# Patient Record
Sex: FEMALE | Race: WHITE | NOT HISPANIC OR LATINO | Employment: OTHER | URBAN - METROPOLITAN AREA
[De-identification: names, ages, dates, MRNs, and addresses within clinical notes are randomized per-mention and may not be internally consistent; named-entity substitution may affect disease eponyms.]

---

## 2017-01-05 ENCOUNTER — OFFICE VISIT (OUTPATIENT)
Dept: SURGERY | Facility: CLINIC | Age: 72
End: 2017-01-05
Payer: MEDICARE

## 2017-01-05 DIAGNOSIS — K21.9 GASTROESOPHAGEAL REFLUX DISEASE, ESOPHAGITIS PRESENCE NOT SPECIFIED: Primary | ICD-10-CM

## 2017-01-05 DIAGNOSIS — K21.00 REFLUX ESOPHAGITIS: ICD-10-CM

## 2017-01-05 DIAGNOSIS — Z12.9 CANCER SCREENING: ICD-10-CM

## 2017-01-05 PROCEDURE — 99203 OFFICE O/P NEW LOW 30 MIN: CPT | Mod: S$PBB,,, | Performed by: COLON & RECTAL SURGERY

## 2017-01-05 NOTE — PROGRESS NOTES
Patient ID:  Ayla Dela Cruz is a 71 y.o. female     Chief Complaint: No chief complaint on file.       HPI: history of reflux. Had stroke Was scheduled for colonoscopy but it was posponed. Old records not available    ROS:        Constitutional: No fever, chills, activity or appetite change.      HENT: No hearing loss, facial swelling, neck pain or stiffness.       Eyes: No discharge, itching and visual disturbance.      Respiratory: No apnea, cough, choking or shortness of breath.       Cardiovascular: No leg swelling or chest pain      Gastrointestinal: No abdominal distention or change in bowel habbits     Genitourinary: No dysuria, frequency or flank pain.      Musculoskeletal: No arthralgias or gait problem.      Neurological: No dizziness, seizures or weakness.      Hematological: No adenopathy.      Psychiatric/Behavioral: No hallucinations or behavioral problems.       PE:    APPEARANCE: Well nourished, well developed, in no acute distress.   CHEST: Lungs clear. Normal respiratory effort.  CARDIOVASCULAR: Normal rhythm. No edema.  ABDOMEN: Soft. No tenderness or masses.  Rectum:  Normal skin, NST, no masses or tenderness    Musculoskeletal: Symmetric, normal range of motion and strength.   Neurological: Alert and oriented to person, place, and time. Normal reflexes.   Skin: Skin is warm and dry.   Psychiatric: Normal mood and affect. Behavior is normal and appropriate.     Impression: Reflus, need for colorectal cancer screening  PLAN: EGD Colonoscopy

## 2017-01-16 ENCOUNTER — DOCUMENTATION ONLY (OUTPATIENT)
Dept: FAMILY MEDICINE | Facility: CLINIC | Age: 72
End: 2017-01-16

## 2017-01-16 ENCOUNTER — OFFICE VISIT (OUTPATIENT)
Dept: FAMILY MEDICINE | Facility: CLINIC | Age: 72
End: 2017-01-16
Payer: MEDICARE

## 2017-01-16 VITALS
TEMPERATURE: 98 F | OXYGEN SATURATION: 98 % | SYSTOLIC BLOOD PRESSURE: 132 MMHG | WEIGHT: 135.13 LBS | HEART RATE: 90 BPM | HEIGHT: 67 IN | DIASTOLIC BLOOD PRESSURE: 77 MMHG | BODY MASS INDEX: 21.21 KG/M2

## 2017-01-16 DIAGNOSIS — I10 ESSENTIAL HYPERTENSION: ICD-10-CM

## 2017-01-16 DIAGNOSIS — E78.5 HYPERLIPIDEMIA, UNSPECIFIED HYPERLIPIDEMIA TYPE: ICD-10-CM

## 2017-01-16 DIAGNOSIS — Z86.39 HISTORY OF ELEVATED GLUCOSE: ICD-10-CM

## 2017-01-16 DIAGNOSIS — K21.9 GASTROESOPHAGEAL REFLUX DISEASE WITHOUT ESOPHAGITIS: ICD-10-CM

## 2017-01-16 DIAGNOSIS — Z78.0 POST-MENOPAUSAL: ICD-10-CM

## 2017-01-16 DIAGNOSIS — Z00.00 ANNUAL PHYSICAL EXAM: Primary | ICD-10-CM

## 2017-01-16 DIAGNOSIS — F41.9 ANXIETY: ICD-10-CM

## 2017-01-16 PROCEDURE — 99214 OFFICE O/P EST MOD 30 MIN: CPT | Mod: S$PBB,,, | Performed by: PHYSICIAN ASSISTANT

## 2017-01-16 PROCEDURE — 99999 PR PBB SHADOW E&M-EST. PATIENT-LVL IV: CPT | Mod: PBBFAC,,, | Performed by: PHYSICIAN ASSISTANT

## 2017-01-16 PROCEDURE — G0008 ADMIN INFLUENZA VIRUS VAC: HCPCS | Mod: PBBFAC,PO | Performed by: FAMILY MEDICINE

## 2017-01-16 PROCEDURE — 99214 OFFICE O/P EST MOD 30 MIN: CPT | Mod: PBBFAC,PO | Performed by: PHYSICIAN ASSISTANT

## 2017-01-16 RX ORDER — CLOBETASOL PROPIONATE 0.5 MG/G
CREAM TOPICAL
Refills: 3 | Status: ON HOLD | COMMUNITY
Start: 2016-11-25 | End: 2018-03-06 | Stop reason: CLARIF

## 2017-01-16 RX ORDER — VILAZODONE HYDROCHLORIDE 40 MG/1
40 TABLET ORAL DAILY
Refills: 2 | COMMUNITY
Start: 2016-12-19 | End: 2021-02-04 | Stop reason: CLARIF

## 2017-01-16 NOTE — PROGRESS NOTES
Pre-Visit Chart Review  For Appointment Scheduled on 1/16/17    Health Maintenance Due   Topic Date Due    TETANUS VACCINE  03/12/1961    Zoster Vaccine  03/12/2003    Colonoscopy  03/12/2013    Influenza Vaccine  08/01/2016    Pneumococcal (65+) (2 of 2 - PPSV23) 11/16/2016                   c

## 2017-01-16 NOTE — MR AVS SNAPSHOT
[facility]  [address]  [facility] [address]  Phone: [phone]  Fax: [phone]                  Ayla Dela Cruz   2017 2:40 PM   Office Visit    Description:  Female : 1945   Provider:  VIOLET Dominguez   Department:  [facility]           Reason for Visit     Annual Exam           Diagnoses this Visit        Comments    Annual physical exam    -  Primary     History of elevated glucose         Anxiety         Hyperlipidemia, unspecified hyperlipidemia type         Post-menopausal                To Do List           Future Appointments        Provider Department Dept Phone    2017 8:15 AM LAB, CHRISTOPHER SAT [facility] - Lab [phone]    2017 9:00 AM SLIC DEXA1 [facility] [phone]    4/3/2017 3:30 PM Barrett Jurado MD [facility] - Cardiology [phone]      Your Future Surgeries/Procedures     2017   Surgery with Jonathan Schultz MD   [facility] [address]  [facility] [id number] [phone]              Goals (5 Years of Data)     None      [facility]     [facility] Care [facility] -  Assistance  Registered nurses in the [facility] provide clinical advisement, health education, appointment booking, and other advisory services.  Call for this free service at [phone].             Medications           Message regarding Medications     Verify the changes and/or additions to your medication regime listed below are the same as discussed with your clinician today.  If any of these changes or additions are incorrect, please notify your healthcare provider.        STOP taking these medications     SENNOSIDES (SENOKOT ORAL) Take by mouth as needed.     PATADAY 0.2 % Drop 1 DROP IN BOTH EYES DAILY           Verify that the below list of medications is an accurate representation of the medications you are currently taking.  " If none reported, the list may be blank. If incorrect, please contact your healthcare provider. Carry this list with you in case of emergency.           Current Medications     ammonium lactate (LAC-HYDRIN) 12 % lotion     clobetasol (TEMOVATE) 0.05 % cream APPLY TWICE DAILY TO RASH UP TO 2 WEEKS/MONTH AS NEEDED.    dorzolamide-timolol (COSOPT) 2-0.5 % ophthalmic solution Place 1 drop into both eyes 2 (two) times daily. Twice a day    esomeprazole (NEXIUM) 40 MG capsule TAKE 1 CAPSULE (40 MG TOTAL) BY MOUTH ONCE DAILY. EVERY DAY    flecainide (TAMBOCOR) 100 MG Tab TAKE 1 TABLET (100 MG TOTAL) BY MOUTH EVERY 12 (TWELVE) HOURS.    lorazepam (ATIVAN) 1 MG tablet Take 1 tablet (1 mg total) by mouth 3 (three) times daily as needed for Anxiety. anxiety    losartan (COZAAR) 100 MG tablet TAKE 1 TABLET (100 MG TOTAL) BY MOUTH ONCE DAILY.    polyethylene glycol (GLYCOLAX) 17 gram PwPk Take 17 g by mouth as needed.    rosuvastatin (CRESTOR) 40 MG Tab Take 1 tablet (40 mg total) by mouth every evening.    salsalate (DISALCID) 500 MG Tab 500 mg once daily.     VIIBRYD 40 mg Tab tablet Take 40 mg by mouth once daily.    XARELTO 20 mg Tab TAKE 1 TABLET (20 MG TOTAL) BY MOUTH DAILY WITH DINNER OR EVENING MEAL.           Clinical Reference Information           Vital Signs - Last Recorded  Most recent update: 1/16/2017  2:59 PM by Jessica Alford    BP Pulse Temp Ht Wt SpO2    132/77 (BP Location: Right arm, Patient Position: Sitting, BP Method: Automatic) 90 97.5 °F (36.4 °C) (Oral) 5' 7" (1.702 m) 61.3 kg (135 lb 2.3 oz) 98%    BMI                21.17 kg/m2          Blood Pressure          Most Recent Value    BP  132/77      Allergies as of 1/16/2017     Atorvastatin    Augmentin [Amoxicillin-pot Clavulanate]    Baclofen (Bulk)    Ciprofloxacin (Bulk)    Decongest Tabs    Erythromycin    Fluoxetine    Lisinopril    Morphine    Venlafaxine Analogues    Afrin [Oxymetazoline]    Caffeine      Immunizations Administered on Date of " Encounter - 1/16/2017     Name Date Dose VIS Date Route    Influenza - High Dose 1/16/2017 0.5 mL 8/7/2015 Intramuscular      Orders Placed During Today's Visit      Normal Orders This Visit    Influenza - High Dose (65+) (PF) (IM)     Future Labs/Procedures Expected by Expires    CBC auto differential  1/16/2017 3/17/2018    Comprehensive metabolic panel  1/16/2017 3/17/2018    DXA Bone Density Spine And Hip  1/16/2017 1/16/2018    Hemoglobin A1c  1/16/2017 3/17/2018    Lipid panel  1/16/2017 3/17/2018    TSH  1/16/2017 3/17/2018

## 2017-01-17 NOTE — PROGRESS NOTES
Subjective:       Patient ID: Ayla Dela Cruz is a 71 y.o. female.    Chief Complaint: Annual Exam    HPI   Patient is a 71 year old  female presenting tot he clinic for routine physical exam. She is doing much better after hospitalization in fall of last year for cholecystitis with sepsis. She is following with Dr. Jurado in cardiology. She is due for some fasting labs, dexa bone scan. Mammogram is up-to-date as on 11/2016. She is scheduled for colonoscopy Thursday with Dr. Schultz. She requests flu vaccintation today.   Review of Systems   Constitutional: Negative for activity change, appetite change, chills, diaphoresis, fatigue and fever.   HENT: Negative for congestion, postnasal drip and rhinorrhea.    Respiratory: Negative.  Negative for cough, shortness of breath and wheezing.    Cardiovascular: Negative.  Negative for chest pain.   Gastrointestinal: Negative for abdominal pain, blood in stool, constipation, diarrhea, nausea and vomiting.   Genitourinary: Negative for dysuria, frequency, hematuria and urgency.   Musculoskeletal: Negative.    Skin: Negative.  Negative for color change and rash.   Neurological: Negative for dizziness, syncope, light-headedness and headaches.   Psychiatric/Behavioral: Negative for agitation, behavioral problems and confusion.       Objective:      Physical Exam   Constitutional: Vital signs are normal. She appears well-developed and well-nourished. No distress.   HENT:   Head: Normocephalic and atraumatic.   Right Ear: Hearing, tympanic membrane, external ear and ear canal normal.   Left Ear: Hearing, tympanic membrane, external ear and ear canal normal.   Nose: Nose normal.   Mouth/Throat: Uvula is midline and oropharynx is clear and moist.   Cardiovascular: Normal rate, regular rhythm, S1 normal, S2 normal and normal heart sounds.  Exam reveals no gallop.    No murmur heard.  Pulses:       Radial pulses are 2+ on the right side, and 2+ on the left side.   <2sec cap  refill fingers bilat     Pulmonary/Chest: Effort normal and breath sounds normal. No respiratory distress. She has no wheezes. She has no rhonchi.   Abdominal: Soft. Normal appearance. There is no tenderness. There is no rigidity, no guarding, no tenderness at McBurney's point and negative Carter's sign.   Skin: Skin is warm and dry. She is not diaphoretic.   Appropriate skin turgor   Psychiatric: She has a normal mood and affect. Her speech is normal and behavior is normal. Judgment and thought content normal. Cognition and memory are normal.       Assessment:       1. Annual physical exam    2. History of elevated glucose    3. Anxiety    4. Hyperlipidemia, unspecified hyperlipidemia type    5. Post-menopausal    6. Essential hypertension    7. Gastroesophageal reflux disease without esophagitis        Plan:   Ayla was seen today for annual exam.    Diagnoses and all orders for this visit:    Annual physical exam  -     CBC auto differential; Future  -     Comprehensive metabolic panel; Future    History of elevated glucose  -     Hemoglobin A1c; Future    Anxiety  -     CBC auto differential; Future  -     TSH; Future    Hyperlipidemia, unspecified hyperlipidemia type  -     CBC auto differential; Future  -     Lipid panel; Future    Post-menopausal  -     DXA Bone Density Spine And Hip; Future    Essential hypertension  Good control  Continue current bp medications    Gastroesophageal reflux disease without esophagitis  Well controlled on Nexium  Continue current dose of medication    Other orders  -     Influenza - High Dose (65+) (PF) (IM)    Patient readiness: acceptance and barriers:none    During the course of the visit the patient was educated and counseled about the following:     Hypertension:   Medication: no change.    Goals: Hypertension: Reduce Blood Pressure    Did patient meet goals/outcomes: No    The following self management tools provided: declined    Patient Instructions (the written plan)  was given to the patient/family.     Time spent with patient: 25 minutes

## 2017-01-18 ENCOUNTER — LAB VISIT (OUTPATIENT)
Dept: LAB | Facility: HOSPITAL | Age: 72
End: 2017-01-18
Attending: FAMILY MEDICINE
Payer: MEDICARE

## 2017-01-18 DIAGNOSIS — E78.5 HYPERLIPIDEMIA, UNSPECIFIED HYPERLIPIDEMIA TYPE: ICD-10-CM

## 2017-01-18 DIAGNOSIS — Z86.39 HISTORY OF ELEVATED GLUCOSE: ICD-10-CM

## 2017-01-18 DIAGNOSIS — F41.9 ANXIETY: ICD-10-CM

## 2017-01-18 DIAGNOSIS — Z00.00 ANNUAL PHYSICAL EXAM: ICD-10-CM

## 2017-01-18 LAB
ALBUMIN SERPL BCP-MCNC: 3.7 G/DL
ALP SERPL-CCNC: 67 U/L
ALT SERPL W/O P-5'-P-CCNC: 10 U/L
ANION GAP SERPL CALC-SCNC: 9 MMOL/L
AST SERPL-CCNC: 21 U/L
BASOPHILS # BLD AUTO: 0.05 K/UL
BASOPHILS NFR BLD: 1 %
BILIRUB SERPL-MCNC: 0.5 MG/DL
BUN SERPL-MCNC: 17 MG/DL
CALCIUM SERPL-MCNC: 9.2 MG/DL
CHLORIDE SERPL-SCNC: 107 MMOL/L
CHOLEST/HDLC SERPL: 2.4 {RATIO}
CO2 SERPL-SCNC: 27 MMOL/L
CREAT SERPL-MCNC: 0.8 MG/DL
DIFFERENTIAL METHOD: ABNORMAL
EOSINOPHIL # BLD AUTO: 0.4 K/UL
EOSINOPHIL NFR BLD: 7 %
ERYTHROCYTE [DISTWIDTH] IN BLOOD BY AUTOMATED COUNT: 15.1 %
EST. GFR  (AFRICAN AMERICAN): >60 ML/MIN/1.73 M^2
EST. GFR  (NON AFRICAN AMERICAN): >60 ML/MIN/1.73 M^2
GLUCOSE SERPL-MCNC: 93 MG/DL
HCT VFR BLD AUTO: 38.6 %
HDL/CHOLESTEROL RATIO: 41.5 %
HDLC SERPL-MCNC: 188 MG/DL
HDLC SERPL-MCNC: 78 MG/DL
HGB BLD-MCNC: 12.3 G/DL
LDLC SERPL CALC-MCNC: 92 MG/DL
LYMPHOCYTES # BLD AUTO: 0.7 K/UL
LYMPHOCYTES NFR BLD: 13.4 %
MCH RBC QN AUTO: 30.2 PG
MCHC RBC AUTO-ENTMCNC: 31.9 %
MCV RBC AUTO: 95 FL
MONOCYTES # BLD AUTO: 0.9 K/UL
MONOCYTES NFR BLD: 16.5 %
NEUTROPHILS # BLD AUTO: 3.2 K/UL
NEUTROPHILS NFR BLD: 61.9 %
NONHDLC SERPL-MCNC: 110 MG/DL
PLATELET # BLD AUTO: 277 K/UL
PMV BLD AUTO: 9.5 FL
POTASSIUM SERPL-SCNC: 4 MMOL/L
PROT SERPL-MCNC: 6.9 G/DL
RBC # BLD AUTO: 4.07 M/UL
SODIUM SERPL-SCNC: 143 MMOL/L
TRIGL SERPL-MCNC: 90 MG/DL
TSH SERPL DL<=0.005 MIU/L-ACNC: 1.51 UIU/ML
WBC # BLD AUTO: 5.16 K/UL

## 2017-01-18 PROCEDURE — 83036 HEMOGLOBIN GLYCOSYLATED A1C: CPT

## 2017-01-18 PROCEDURE — 36415 COLL VENOUS BLD VENIPUNCTURE: CPT | Mod: PO

## 2017-01-18 PROCEDURE — 84443 ASSAY THYROID STIM HORMONE: CPT

## 2017-01-18 PROCEDURE — 80061 LIPID PANEL: CPT

## 2017-01-18 PROCEDURE — 80053 COMPREHEN METABOLIC PANEL: CPT

## 2017-01-18 PROCEDURE — 85025 COMPLETE CBC W/AUTO DIFF WBC: CPT

## 2017-01-19 ENCOUNTER — ANESTHESIA (OUTPATIENT)
Dept: ENDOSCOPY | Facility: HOSPITAL | Age: 72
End: 2017-01-19
Payer: MEDICARE

## 2017-01-19 ENCOUNTER — ANESTHESIA EVENT (OUTPATIENT)
Dept: ENDOSCOPY | Facility: HOSPITAL | Age: 72
End: 2017-01-19
Payer: MEDICARE

## 2017-01-19 ENCOUNTER — SURGERY (OUTPATIENT)
Age: 72
End: 2017-01-19

## 2017-01-19 ENCOUNTER — HOSPITAL ENCOUNTER (OUTPATIENT)
Facility: HOSPITAL | Age: 72
Discharge: HOME | End: 2017-01-19
Attending: COLON & RECTAL SURGERY | Admitting: COLON & RECTAL SURGERY
Payer: MEDICARE

## 2017-01-19 VITALS — RESPIRATION RATE: 23 BRPM

## 2017-01-19 DIAGNOSIS — R93.89 THICKENED ENDOMETRIUM: ICD-10-CM

## 2017-01-19 DIAGNOSIS — K21.00 REFLUX ESOPHAGITIS: ICD-10-CM

## 2017-01-19 DIAGNOSIS — R80.9 MICROALBUMINURIA: ICD-10-CM

## 2017-01-19 DIAGNOSIS — I51.7 CARDIOMEGALY: ICD-10-CM

## 2017-01-19 DIAGNOSIS — R00.0 SINUS TACHYCARDIA: ICD-10-CM

## 2017-01-19 DIAGNOSIS — I49.3 VPC (VENTRICULAR PREMATURE COMPLEX): ICD-10-CM

## 2017-01-19 DIAGNOSIS — L29.9 CHRONIC PRURITUS: ICD-10-CM

## 2017-01-19 DIAGNOSIS — Z86.73 H/O: CVA (CEREBROVASCULAR ACCIDENT): ICD-10-CM

## 2017-01-19 DIAGNOSIS — Z12.9 CANCER SCREENING: ICD-10-CM

## 2017-01-19 DIAGNOSIS — Z79.01 ANTICOAGULATED: ICD-10-CM

## 2017-01-19 DIAGNOSIS — I50.32 CHRONIC DIASTOLIC HEART FAILURE: ICD-10-CM

## 2017-01-19 DIAGNOSIS — I48.0 PAROXYSMAL ATRIAL FIBRILLATION: Primary | ICD-10-CM

## 2017-01-19 DIAGNOSIS — J84.9 INTERSTITIAL LUNG DISEASE: ICD-10-CM

## 2017-01-19 DIAGNOSIS — I95.2 HYPOTENSION DUE TO DRUGS: ICD-10-CM

## 2017-01-19 DIAGNOSIS — R79.89 ELEVATED BRAIN NATRIURETIC PEPTIDE (BNP) LEVEL: ICD-10-CM

## 2017-01-19 DIAGNOSIS — R41.0 ACUTE DELIRIUM: ICD-10-CM

## 2017-01-19 DIAGNOSIS — Z78.9 STATIN INTOLERANCE: ICD-10-CM

## 2017-01-19 DIAGNOSIS — E88.09 HYPOALBUMINEMIA: ICD-10-CM

## 2017-01-19 DIAGNOSIS — F41.9 ANXIETY: ICD-10-CM

## 2017-01-19 DIAGNOSIS — Z12.11 COLON CANCER SCREENING: ICD-10-CM

## 2017-01-19 DIAGNOSIS — R93.89 ABNORMAL CT SCAN, CHEST: ICD-10-CM

## 2017-01-19 LAB
ESTIMATED AVG GLUCOSE: 123 MG/DL
HBA1C MFR BLD HPLC: 5.9 %

## 2017-01-19 PROCEDURE — 43239 EGD BIOPSY SINGLE/MULTIPLE: CPT | Performed by: COLON & RECTAL SURGERY

## 2017-01-19 PROCEDURE — 25000003 PHARM REV CODE 250: Performed by: COLON & RECTAL SURGERY

## 2017-01-19 PROCEDURE — 37000008 HC ANESTHESIA 1ST 15 MINUTES: Performed by: COLON & RECTAL SURGERY

## 2017-01-19 PROCEDURE — 37000009 HC ANESTHESIA EA ADD 15 MINS: Performed by: COLON & RECTAL SURGERY

## 2017-01-19 PROCEDURE — 27201012 HC FORCEPS, HOT/COLD, DISP: Performed by: COLON & RECTAL SURGERY

## 2017-01-19 PROCEDURE — 43239 EGD BIOPSY SINGLE/MULTIPLE: CPT | Mod: 51,,, | Performed by: COLON & RECTAL SURGERY

## 2017-01-19 PROCEDURE — 27201089 HC SNARE, DISP (ANY): Performed by: COLON & RECTAL SURGERY

## 2017-01-19 PROCEDURE — 45385 COLONOSCOPY W/LESION REMOVAL: CPT | Mod: ,,, | Performed by: COLON & RECTAL SURGERY

## 2017-01-19 PROCEDURE — 88305 TISSUE EXAM BY PATHOLOGIST: CPT | Performed by: PATHOLOGY

## 2017-01-19 PROCEDURE — 88342 IMHCHEM/IMCYTCHM 1ST ANTB: CPT | Mod: 26,,, | Performed by: PATHOLOGY

## 2017-01-19 PROCEDURE — 63600175 PHARM REV CODE 636 W HCPCS: Performed by: NURSE ANESTHETIST, CERTIFIED REGISTERED

## 2017-01-19 PROCEDURE — D9220A PRA ANESTHESIA: Mod: CRNA,,, | Performed by: NURSE ANESTHETIST, CERTIFIED REGISTERED

## 2017-01-19 PROCEDURE — 45385 COLONOSCOPY W/LESION REMOVAL: CPT | Performed by: COLON & RECTAL SURGERY

## 2017-01-19 PROCEDURE — D9220A PRA ANESTHESIA: Mod: ANES,,, | Performed by: ANESTHESIOLOGY

## 2017-01-19 RX ORDER — SODIUM CHLORIDE 9 MG/ML
INJECTION, SOLUTION INTRAVENOUS CONTINUOUS
Status: DISCONTINUED | OUTPATIENT
Start: 2017-01-19 | End: 2017-01-19 | Stop reason: HOSPADM

## 2017-01-19 RX ORDER — PROPOFOL 10 MG/ML
VIAL (ML) INTRAVENOUS
Status: DISCONTINUED | OUTPATIENT
Start: 2017-01-19 | End: 2017-01-19

## 2017-01-19 RX ADMIN — PROPOFOL 120 MG: 10 INJECTION, EMULSION INTRAVENOUS at 09:01

## 2017-01-19 RX ADMIN — PROPOFOL 40 MG: 10 INJECTION, EMULSION INTRAVENOUS at 09:01

## 2017-01-19 RX ADMIN — SODIUM CHLORIDE: 0.9 INJECTION, SOLUTION INTRAVENOUS at 08:01

## 2017-01-19 NOTE — OR NURSING
VSS in NAD, met all dc criteria. Written and verbal discharge instructions given and reviewed with pt and  Jan. Both verbalized understanding. Discharged home in care of .

## 2017-01-19 NOTE — ANESTHESIA PREPROCEDURE EVALUATION
01/19/2017  Ayla Dela Cruz is a 71 y.o., female.    [facility] Anesthesia Evaluation    I have reviewed the Patient Summary Reports.    I have reviewed the Nursing Notes.      Review of Systems  Anesthesia Hx:  No problems with previous Anesthesia    Cardiovascular:   Hypertension Dysrhythmias atrial fibrillation ECG has been reviewed. Pulmonary HTN   Hepatic/GI:   GERD    Musculoskeletal:   Arthritis     Neurological:   TIA, CVA    Psych:   Psychiatric History          Physical Exam  General:  Well nourished    Airway/Jaw/Neck:  Airway Findings: Mouth Opening: Normal Tongue: Normal  General Airway Assessment: Adult  Mallampati: III  Improves to II with phonation.  TM Distance: 4 - 6 cm  Jaw/Neck Findings:  Neck ROM: Extension Decreased, Mod.      Dental:  Dental Findings: molar caps   Chest/Lungs:  Chest/Lungs Clear    Heart/Vascular:  Heart Findings: Rate: Normal  Rhythm: Regular Rhythm             Anesthesia Plan  Type of Anesthesia, risks & benefits discussed:  Anesthesia Type:  general  Patient's Preference:   Intra-op Monitoring Plan:   Intra-op Monitoring Plan Comments:   Post Op Pain Control Plan:   Post Op Pain Control Plan Comments:   Induction:   IV  Beta Blocker:  Patient is not currently on a Beta-Blocker (No further documentation required).       Informed Consent: Patient understands risks and agrees with Anesthesia plan.  Questions answered. Anesthesia consent signed with patient.  ASA Score: 3     Day of Surgery Review of History & Physical:    H&P update referred to the surgeon.         Ready For Surgery From Anesthesia Perspective.

## 2017-01-19 NOTE — ANESTHESIA POSTPROCEDURE EVALUATION
"Anesthesia Post Evaluation    Patient: Ayla Dela Cruz    Procedure(s) Performed: Procedure(s) (LRB):  COLONOSCOPY and EGD (N/A)  ESOPHAGOGASTRODUODENOSCOPY (EGD) (N/A)    Final Anesthesia Type: general  Patient location during evaluation: PACU  Patient participation: Yes- Able to Participate  Level of consciousness: awake and alert  Post-procedure vital signs: reviewed and stable  Pain management: adequate  Airway patency: patent  PONV status at discharge: No PONV  Anesthetic complications: no      Cardiovascular status: blood pressure returned to baseline  Respiratory status: unassisted  Hydration status: euvolemic  Follow-up not needed.        Visit Vitals    BP (!) 142/71    Pulse 85    Temp 36.5 °C (97.7 °F) (Oral)    Resp (!) 23    Ht 5' 7" (1.702 m)    Wt 59.9 kg (132 lb)    SpO2 98%    Breastfeeding No    BMI 20.67 kg/m2       Pain/Chalino Score: Pain Assessment Performed: Yes (1/19/2017 10:06 AM)  Presence of Pain: denies (1/19/2017 10:06 AM)  Chalino Score: 10 (1/19/2017 10:06 AM)      "

## 2017-01-19 NOTE — IP AVS SNAPSHOT
[address] [place] [address]  Phone: [phone]           Patient Discharge Instructions     Our goal is to set you up for success. This packet includes information on your condition, medications, and your home care. It will help you to care for yourself so you don't get sicker and need to go back to the hospital.     Please ask your nurse if you have any questions.        There are many details to remember when preparing to leave the hospital. Here is what you will need to do:    1. Take your medicine. If you are prescribed medications, review your Medication List in the following pages. You may have new medications to  at the pharmacy and others that you'll need to stop taking. Review the instructions for how and when to take your medications. Talk with your doctor or nurses if you are unsure of what to do.     2. Go to your follow-up appointments. Specific follow-up information is listed in the following pages. Your may be contacted by a transition nurse or clinical provider about future appointments. Be sure we have all of the phone numbers to reach you, if needed. Please contact your provider's office if you are unable to make an appointment.     3. Watch for warning signs. Your doctor or nurse will give you detailed warning signs to watch for and when to call for assistance. These instructions may also include educational information about your condition. If you experience any of warning signs to your health, call your doctor.               [facility]  Unless otherwise directed by your provider, please contact [facility], our nurse care line that is available for 24/7 assistance.     [phone] (toll-free)    Registered nurses in the [facility] provide clinical advisement, health education, appointment booking, and other advisory services.                    ** Verify the list of medication(s) below is accurate and up to date.  Carry this with you in case of emergency. If your medications have changed, please notify your healthcare provider.             Medication List      CHANGE how you take these medications        Additional Info                      rosuvastatin 40 MG Tab   Commonly known as:  CRESTOR   Quantity:  90 tablet   Refills:  3   Dose:  40 mg   What changed:  additional instructions    Instructions:  Take 1 tablet (40 mg total) by mouth every evening.     Begin Date    AM    Noon    PM    Bedtime         CONTINUE taking these medications        Additional Info                      ammonium lactate 12 % lotion   Commonly known as:  LAC-HYDRIN   Refills:  0      Begin Date    AM    Noon    PM    Bedtime       clobetasol 0.05 % cream   Commonly known as:  TEMOVATE   Refills:  3    Instructions:  APPLY TWICE DAILY TO RASH UP TO 2 WEEKS/MONTH AS NEEDED.     Begin Date    AM    Noon    PM    Bedtime       COSOPT 22.3-6.8 mg/mL ophthalmic solution   Refills:  0   Dose:  1 drop   Generic drug:  dorzolamide-timolol 2-0.5%    Instructions:  Place 1 drop into both eyes 2 (two) times daily. Twice a day     Begin Date    AM    Noon    PM    Bedtime       esomeprazole 40 MG capsule   Commonly known as:  NEXIUM   Quantity:  90 capsule   Refills:  3    Instructions:  TAKE 1 CAPSULE (40 MG TOTAL) BY MOUTH ONCE DAILY. EVERY DAY     Begin Date    AM    Noon    PM    Bedtime       flecainide 100 MG Tab   Commonly known as:  TAMBOCOR   Quantity:  60 tablet   Refills:  11    Instructions:  TAKE 1 TABLET (100 MG TOTAL) BY MOUTH EVERY 12 (TWELVE) HOURS.     Begin Date    AM    Noon    PM    Bedtime       lorazepam 1 MG tablet   Commonly known as:  ATIVAN   Quantity:  90 tablet   Refills:  3   Dose:  1 mg    Instructions:  Take 1 tablet (1 mg total) by mouth 3 (three) times daily as needed for Anxiety. anxiety     Begin Date    AM    Noon    PM    Bedtime       losartan 100 MG tablet   Commonly known as:  COZAAR   Quantity:  90 tablet   Refills:  3     Instructions:  TAKE 1 TABLET (100 MG TOTAL) BY MOUTH ONCE DAILY.     Begin Date    AM    Noon    PM    Bedtime       polyethylene glycol 17 gram Pwpk   Commonly known as:  GLYCOLAX   Refills:  0   Dose:  17 g    Instructions:  Take 17 g by mouth as needed.     Begin Date    AM    Noon    PM    Bedtime       salsalate 500 MG Tab   Commonly known as:  DISALCID   Refills:  3   Dose:  500 mg    Instructions:  500 mg once daily.     Begin Date    AM    Noon    PM    Bedtime       VIIBRYD 40 mg Tab tablet   Refills:  2   Dose:  40 mg   Generic drug:  vilazodone    Instructions:  Take 40 mg by mouth once daily.     Begin Date    AM    Noon    PM    Bedtime       XARELTO 20 mg Tab   Quantity:  30 tablet   Refills:  10   Generic drug:  rivaroxaban    Instructions:  TAKE 1 TABLET (20 MG TOTAL) BY MOUTH DAILY WITH DINNER OR EVENING MEAL.     Begin Date    AM    Noon    PM    Bedtime                  Please bring to all follow up appointments:    1. A copy of your discharge instructions.  2. All medicines you are currently taking in their original bottles.  3. Identification and insurance card.    Please arrive 15 minutes ahead of scheduled appointment time.    Please call 24 hours in advance if you must reschedule your appointment and/or time.        Your Scheduled Appointments     Jan 20, 2017  1:40 PM CST   Bone Density with SLIC DEXA1   [facility] [place]    [address] [place] [address]   [phone]            Apr 03, 2017  3:30 PM CDT   Established Patient Visit with MD [facility] ([facility])    [address]  [place] [address]   [phone]                Discharge Instructions     Future Orders    Activity as tolerated     Diet general     Questions:    Total calories:      Fat restriction, if any:      Protein restriction, if any:      Na restriction, if any:      Fluid restriction:      Additional restrictions:          Discharge  Instructions         Understanding Colon and Rectal Polyps  The colon (also called the large intestine) is a muscular tube that forms the last part of the digestive tract. It absorbs water and stores food waste. The colon is about 4 to 6 feet long. The rectum is the last 6 inches of the colon. The colon and rectum have a smooth lining composed of millions of cells. Changes in these cells can lead to growths in the colon that can become cancerous and should be removed.     The colon has a smooth lining composed of millions of cells.     When the Colon Lining Changes  Changes that occur in the cells that line the colon or rectum can lead to growths called polyps. Over a period of years, polyps can turn cancerous. Removing polyps early may prevent cancer from ever forming.       Polyps  Polyps are fleshy clumps of tissue that form on the lining of the colon or rectum. Small polyps are usually benign (not cancerous). However, over time, cells in a polyp can change and become cancerous. Certain types of polyps known as adenomatous polyps are premalignant. The risk for invasive cancer increases with the size of the polyp and certain cell and gene features. This means that they can become cancerous if they're not removed. Hyperplastic polyps are benign. They can grow quite large and not turn cancerous.     Cancer  Almost all colorectal cancers start when polyp cells begin growing abnormally. As a cancerous tumor grows, it may involve more and more of the colon or rectum. In time, cancer can also grow beyond the colon or rectum and spread to nearby organs or to glands called lymph nodes. The cells can also travel to other parts of the body. This is known as metastasis. The earlier a cancerous tumor is removed, the better the chance of preventing its spread.  © 2100-6493 [employer]. [address] [place] [address], [place] [address]. All rights reserved. This information is not intended as a substitute for professional  medical care. Always follow your healthcare professional's instructions.        Gastritis (Adult)    Gastritis is inflammation and irritation of the stomach lining. It can be present for a short time (acute) or be long lasting (chronic). Gastritis is often caused by infection with bacteria called H pylori. More than a third of people in the [place] have this bacteria in their bodies. In many cases, H pylori causes no problems or symptoms. In some people, though, the infection irritates the stomach lining and causes gastritis. Other causes of stomach irritation include drinking alcohol or taking pain-relieving medicines called NSAIDs (such as aspirin or ibuprofen).   Symptoms of gastritis can include:  · Abdominal pain or bloating  · Loss of appetite  · Nausea or vomiting  · Vomiting blood or having black stools  · Feeling more tired than usual  An inflamed and irritated stomach lining is more likely to develop a sore called an ulcer. To help prevent this, gastritis should be treated.  Home care  If needed, medicines may be prescribed. If you have H pylori infection, treating it will likely relieve your symptoms. Other changes can help reduce stomach irritation and help it heal.  · If you have been prescribed medicines for H pylori infection, take them as directed. Take all of the medicine until it is finished or your healthcare provider tells you to stop, even if you feel better.  · Your healthcare provider may recommend avoiding NSAIDs. If you take daily aspirin for your heart or other medical reasons, do not stop without talking to your healthcare provider first.  · Avoid drinking alcohol.  · Stop smoking. Smoking can irritate the stomach and delay healing. As much as possible, stay away from second hand smoke.  Follow-up care  Follow up with your healthcare provider, or as advised by our staff. Testing may be needed to check for inflammation or an ulcer.  When to seek medical advice  Call your healthcare provider for  "any of the following:  · Stomach pain that gets worse or moves to the lower right abdomen (appendix area)  · Chest pain that appears or gets worse, or spreads to the back, neck, shoulder, or arm  · Frequent vomiting (cant keep down liquids)  · Blood in the stool or vomit (red or black in color)  · Feeling weak or dizzy  · Fever of 100.4ºF (38ºC) or higher, or as directed by your healthcare provider  © 4818-5626 [employer]. [address] [place] [address]. All rights reserved. This information is not intended as a substitute for professional medical care. Always follow your healthcare professional's instructions.            Admission Information     Date & Time Provider Department CSN    1/19/2017  7:04 AM Jonathan Schultz MD [facility] [id number]      Care Providers     Provider Role Specialty Primary office phone    Jonathan cShultz MD Attending Provider Colon and Rectal Surgery [phone]    Jonathan Schultz MD Surgeon  Colon and Rectal Surgery [phone]      Your Vitals Were     BP Pulse Temp Resp Height Weight    90/59 86 97.7 °F (36.5 °C) (Oral) 21 5' 7" (1.702 m) 59.9 kg (132 lb)    SpO2 BMI             100% 20.67 kg/m2         Recent Lab Values        7/25/2008 10/8/2009 9/18/2010 6/3/2014 11/4/2014 2/12/2015 11/16/2015 1/18/2017      7:34 AM 10:03 AM  9:39 AM  3:23 PM  4:04 PM  8:12 AM 10:22 AM  7:26 AM    A1C 5.9 5.7 5.8 5.9 6.5 (H) 5.6 5.8 5.9    Comment for A1C at  7:26 AM on 1/18/2017:  According to ADA guidelines, hemoglobin A1C <7.0% represents  optimal control in non-pregnant diabetic patients.  Different  metrics may apply to specific populations.   Standards of Medical Care in Diabetes - 2016.  For the purpose of screening for the presence of diabetes:  <5.7%     Consistent with the absence of diabetes  5.7-6.4%  Consistent with increasing risk for diabetes   (prediabetes)  >or=6.5%  Consistent with diabetes  Currently no consensus exists for use of " hemoglobin A1C  for diagnosis of diabetes for children.        Allergies as of 1/19/2017        Reactions    Atorvastatin     Other reaction(s): Joint pain    Augmentin [Amoxicillin-pot Clavulanate]     Other reaction(s): Mental Status Change    Baclofen (Bulk) Nausea And Vomiting    Ciprofloxacin (Bulk)     Decongest Tabs     Other reaction(s): increased heart rate    Erythromycin Other (See Comments)    Fluoxetine     Other reaction(s): heart palpitations  Other reaction(s): anxiety    Lisinopril Other (See Comments)    cough    Morphine Other (See Comments)    confusion    Venlafaxine Analogues     Changes in BP and increases heart rate       Afrin [Oxymetazoline] Palpitations    Caffeine Palpitations      Advance Directives     An advance directive is a document which, in the event you are no longer able to make decisions for yourself, tells your healthcare team what kind of treatment you do or do not want to receive, or who you would like to make those decisions for you.  If you do not currently have an advance directive, [facility] encourages you to create one.  For more information call:  [phone]), [phone] ([phone]),  or log on to [email or web address].        Language Assistance Services     ATTENTION: Language assistance services are available, free of charge. Please call [phone].      ATENCIÓN: Si habla español, tiene a nettles disposición servicios gratuitos de asistencia lingüística. Llame al [phone].     CLARISSA Ý: N?u b?n nói Ti?ng Vi?t, có các d?ch v? h? tr? ngôn ng? mi?n phí dành cho b?n. G?i s? [phone].        Heart Failure Education       Heart Failure: Being Active  You have a condition called heart failure. Being active doesnt mean that you have to wear yourself out. Even a little movement each day helps to strengthen your heart. If you cant get out to exercise, you can do simple stretching and strengthening exercises at home. These are good ways to keep  you well-conditioned and prevent you and your heart from becoming excessively weak.    Ideas to get you started  · Add a little movement to things you do now. Walk to mail letters. Park your car at the far end of the parking lot and walk to the store. Walk up a flight of stairs instead of taking the elevator.  · Choose activities you enjoy. You might walk, swim, or ride an exercise bike. Things like gardening and washing the car count, too. Other possibilities include: washing dishes, walking the dog, walking around the mall, and doing aerobic activities with friends.  · Join a group exercise program at a [facility] or [facility], a senior center, or a community center. Or look into a hospital cardiac rehabilitation program. Ask your doctor if you qualify.  Tips to keep you going  · Get up and get dressed each day. Go to a coffee shop and read a newspaper or go somewhere that you'll be in the presence of other active people. Youll feel more like being active.  · Make a plan. Choose one or more activities that you enjoy and that you can easily do. Then plan to do at least one each day. You might write your plan on a calendar.  · Go with a friend or a group if you like company. This can help you feel supported and stay motivated, too.  · Plan social events that you enjoy. This will keep you mentally engaged as well as physically motivated to do things you find pleasure in.  For your safety  · Talk with your healthcare provider before starting an exercise program.  · Exercise indoors when its too hot or too cold outside, or when the air quality is poor. Try walking at a shopping mall.  · Wear socks and sturdy shoes to maintain your balance and prevent falls.  · Start slowly. Do a few minutes several times a day at first. Increase your time and speed little by little.  · Stop and rest whenever you feel tired or get short of breath.  · Dont push yourself on days when you dont feel well.  © 3537-2081 [employer]  [address] [place] [address]. All rights reserved. This information is not intended as a substitute for professional medical care. Always follow your healthcare professional's instructions.              Heart Failure: Evaluating Your Heart  You have a condition called heart failure. To evaluate your condition, your doctor will examine you, ask questions, and do some tests. Along with looking for signs of heart failure, the doctor looks for any other health problems that may have led to heart failure. The results of your evaluation will help your doctor form a treatment plan.  Health history and physical exam  Your visit will start with a health history. Tell the doctor about any symptoms youve noticed and about all medicines you take. Then youll have a physical exam. This includes listening to your heartbeat and breathing. Youll also be checked for swelling (edema) in your legs and neck. When you have fluid buildup or fluid in the lungs, it may be called congestive heart failure.  Diagnosing heart failure     During an echocardiogram, sound waves bounce off the heart. These are converted into a picture on the screen.   The following may be done to help your doctor form a diagnosis:  · X-rays show the size and shape of your heart. These pictures can also show fluid in your lungs.  · An electrocardiogram (ECG or EKG) shows the pattern of your heartbeat. Small pads (electrodes) are placed on your chest, arms, and legs. Wires connect the pads to the ECG machine, which records your hearts electrical signals. This can give the doctor information about heart function.  · An echocardiogram uses ultrasound waves to show the structure and movement of your heart muscle. This shows how well the heart pumps. It also shows the thickness of the heart walls, and if the heart is enlarged. It is one of the most useful, non-invasive tests as it provides information about the heart's general function. This helps your  doctor make treatment decisions.  · Lab tests evaluate small amounts of blood or urine for signs of problems. A BNP lab test can help diagnose and evaluate heart failure. BNP stands for B-type natriuretic peptide. The ventricles secrete more BNP when heart failure worsens. Lab tests can also provide information about metabolic dysfunction or heart dysfunction.  Your treatment plan  Based on the results of your evaluation and tests, your doctor will develop a treatment plan. This plan is designed to relieve some of your heart failure symptoms and help make you more comfortable. Your treatment plan may include:  · Medicine to help your heart work better and improve your quality of life  · Changes in what you eat and drink to help prevent fluid from backing up in your body  · Daily monitoring of your weight and heart failure symptoms to see how well your treatment plan is working  · Exercise to help you stay healthy  · Help with quitting smoking  · Emotional and psychological support to help adjust to the changes  · Referrals to other specialists to make sure you are being treated comprehensively  © 8322-1070 [employer]. [address] [place] [address]. All rights reserved. This information is not intended as a substitute for professional medical care. Always follow your healthcare professional's instructions.              Heart Failure: Making Changes to Your Diet  You have a condition called heart failure. When you have heart failure, excess fluid is more likely to build up in your body because your heart isn't working well. This makes the heart work harder to pump blood. Fluid buildup causes symptoms such as shortness of breath and swelling (edema). This is often referred to as congestive heart failure or CHF. Controlling the amount of salt (sodium) you eat may help stop fluid from building up. Your doctor may also tell you to reduce the amount of fluid you drink.  Reading food  labels    Your healthcare provider will tell you how much sodium you can eat each day. Read food labels to keep track. Keep in mind that certain foods are high in salt. These include canned, frozen, and processed foods. Check the amount of sodium in each serving. Watch out for high-sodium ingredients. These include MSG (monosodium glutamate), baking soda, and sodium phosphate.   Eating less salt  Give yourself time to get used to eating less salt. It may take a little while. Here are some tips to help:  · Take the saltshaker off the table. Replace it with salt-free herb mixes and spices.  · Eat fresh or plain frozen vegetables. These have much less salt than canned vegetables.  · Choose low-sodium snacks like sodium-free pretzels, crackers, or air-popped popcorn.  · Dont add salt to your food when youre cooking. Instead, season your foods with pepper, lemon, garlic, or onion.  · When you eat out, ask that your food be cooked without added salt.  · Avoid eating fried foods as these often have a great deal of salt.  If youre told to limit fluids  You may need to limit how much fluid you have to help prevent swelling. This includes anything that is liquid at room temperature, such as ice cream and soup. If your doctor tells you to limit fluid, try these tips:  · Measure drinks in a measuring cup before you drink them. This will help you meet daily goals.  · Chill drinks to make them more refreshing.  · Suck on frozen lemon wedges to quench thirst.  · Only drink when youre thirsty.  · Chew sugarless gum or suck on hard candy to keep your mouth moist.  · Weigh yourself daily to know if your body's fluid content is rising.  My sodium goal  Your healthcare provider may give you a sodium goal to meet each day. This includes sodium found in food as well as salt that you add. My goal is to eat no more than ___________ mg of sodium per day.     When to call your doctor  Call your doctor right away if you have any symptoms  of worsening heart failure. These can include:  · Sudden weight gain  · Increased swelling of your legs or ankles  · Trouble breathing when youre resting or at night  · Increase in the number of pillows you have to sleep on  · Chest pain, pressure, discomfort, or pain in the jaw, neck, or back   © 20002489-6034 [employer]. [address] [place] [address]. All rights reserved. This information is not intended as a substitute for professional medical care. Always follow your healthcare professional's instructions.              Heart Failure: Medicines to Help Your Heart    You have a condition called heart failure (also known as congestive heart failure, or CHF). Your doctor will likely prescribe medicines for heart failure and any underlying health problems you have. Most heart failure patients take one or more types of medicinen. Your healthcare provider will work to find the combination of medicines that works best for you.  Heart failure medicines  Here are the most common heart failure medicines:  · ACE inhibitors lower blood pressure and decrease strain on the heart. This makes it easier for the heart to pump. Angiotensin receptor blockers have similar effects. These are prescribed for some patients instead of ACE inhibitors.  · Beta-blockers relieve stress on the heart. They also improve symptoms. They may also improve the heart's pumping action over time.  · Diuretics (also called water pills) help rid your body of excess water. This can help rid your body of swelling (edema). Having less fluid to pump means your heart doesnt have to work as hard. Some diuretics make your body lose a mineral called potassium. Your doctor will tell you if you need to take supplements or eat more foods high in potassium.  · Digoxin helps your heart pump with more strength. This helps your heart pump more blood with each beat. So, more oxygen-rich blood travels to the rest of the body.  · Aldosterone  antagonists help alter hormones and decrease strain on the heart.  · Hydralazine and nitrates are two separate medicines used together to treat heart failure. They may come in one combination pill. They lower blood pressure and decrease how hard the heart has to pump.  Medicines for related conditions  Controlling other heart problems helps keep heart failure under control, too. Depending on other heart problems you have, medicines may be prescribed to:  · Lower blood pressure (antihypertensives).  · Lower cholesterol levels (statins).  · Prevent blood clots (anticoagulants or aspirin).  · Keep the heartbeat steady (antiarrhythmics).  © 7473-8569 [employer]. [address] [place] [address], [place] [address]. All rights reserved. This information is not intended as a substitute for professional medical care. Always follow your healthcare professional's instructions.              Heart Failure: Procedures That May Help    The heart is a muscle that pumps oxygen-rich blood to all parts of the body. When you have heart failure, the heart is not able to pump as well as it should. Blood and fluid may back up into the lungs (congestive heart failure), and some parts of the body dont get enough oxygen-rich blood to work normally. These problems lead to the symptoms of heart failure.     Certain procedures may help the heart pump better in some cases of heart failure. Some procedures are done to treat health problems that may have caused the heart failure such as coronary artery disease or heart rhythm problems. For more serious heart failure, other options are available.  Treating artery and valve problems  If you have coronary artery disease or valve disease, procedures may be done to improve blood flow. This helps the heart pump better, which can improve heart failure symptoms. First, your doctor may do a cardiac catheterization to help detect clogged blood vessels or valve damage. During this procedure, a  thin  tube (catheter) in inserted into a blood vessel and guided to the heart. There a dye is injected and a special type of X-ray (angiogram) is taken of the blood vessels. Procedures to open a blocked artery or fix damaged valves can also be done using catheterization.  · Angioplasty uses a balloon-tipped instrument at the end of the catheter. The balloon is inflated to widen the narrowed artery. In many cases, a stent is expanded to further support the narrowed artery. A stent is a metal mesh tube.  · Valve surgery repairs or replacement of faulty valves can also be done during catheterization so blood can flow properly through the chambers of the heart.  Bypass surgery is another option to help treat blocked arteries. It uses a healthy blood vessel from elsewhere in the body. The healthy blood vessel is attached above and below the blocked area so that blood can flow around the blocked artery.  Treating heart rhythm problems  A device may be placed in the chest to help a weak heart maintain a healthy, heartbeat so the heart can pump more effectively:  · Pacemaker. A pacemaker is an implanted device that regulates your heartbeat electronically. It monitors your heart's rhythm and generates a painless electric impulse that helps the heart beat in a regular rhythm. A pacemaker is programmed to meet your specific heart rhythm needs.  · Biventricular pacing/cardiac resynchronization therapy. A type of pacemaker that paces both pumping chambers of the heart at the same time to coordinate contractions and to improve the heart's function. Some people with heart failure are candidates for this therapy.  · Implantable cardioverter defibrillator. A device similar to a pacemaker that senses when the heart is beating too fast and delivers an electrical shock to convert the fast rhythm to a normal rhythm. This can be a life saving device.  In severe cases  In more serious cases of heart failure when other treatments no longer work,  other options may include:  · Ventricular assist devices (VADs). These are mechanical devices used to take over the pumping function for one or both of the heart's ventricles, or pumping chambers. A VAD may be necessary when heart failure progresses to the point that medicines and other treatments no longer help. In some cases, a VAD may be used as a bridge to transplant.  · Heart transplant. This is replacing the diseased heart with a healthy one from a donor. This is an option for a few people who are very sick. A heart transplant is very serious and not an option for all patients. Your doctor can tell you more.  © 1184-2579 [employer]. [address] [place] [address], [place] [address]. All rights reserved. This information is not intended as a substitute for professional medical care. Always follow your healthcare professional's instructions.              Heart Failure: Tracking Your Weight  You have a condition called heart failure. When you have heart failure, a sudden weight gain or a steady rise in weight is a warning sign that your body is retaining too much water and salt. This could mean your heart failure is getting worse. If left untreated, it can cause problems for your lungs and result in shortness of breath. Weighing yourself each day is the best way to know if youre retaining water. If your weight goes up quickly, call your doctor. You will be given instructions on how to get rid of the excess water. You will likely need medicines and to avoid salt. This will help your heart work better.  Call your doctor if you gain more than 2 pounds in 1 day, more than 5 pounds in 1 week, or whatever weight gain you were told to report by your doctor. This is often a sign of worsening heart failure and needs to be evaluated and treated. Your doctor will tell you what to do next.   Tips for weighing yourself    · Weigh yourself at the same time each morning, wearing the same clothes. Weigh yourself after  urinating and before eating.  · Use the same scale each day. Make sure the numbers are easy to read. Put the scale on a flat, hard surface -- not on a rug or carpet.  · Do not stop weighing yourself. If you forget one day, weigh again the next morning.  How to use your weight chart  · Keep your weight chart near the scale. Write your weight on the chart as soon as you get off the scale.  · Fill in the month and the start date on the chart. Then write down your weight each day. Your chart will look like this:    · If you miss a day, leave the space blank. Weigh yourself the next day and write your weight in the next space.  · Take your weight chart with you when you go to see your doctor.  © 1584-3502 [employer]. [address] [place] [address], [place] [address]. All rights reserved. This information is not intended as a substitute for professional medical care. Always follow your healthcare professional's instructions.              Heart Failure: Warning Signs of a Flare-Up  You have a condition called heart failure. Once you have heart failure, flare-ups can happen. Below are signs that can mean your heart failure is getting worse. If you notice any of these warning signs, call your healthcare provider.  Swelling    · Your feet, ankles, or lower legs get puffier.  · You notice skin changes on your lower legs.  · Your shoes feel too tight.  · Your clothes are tighter in the waist.  · You have trouble getting rings on or off your fingers.  Shortness of breath  · You have to breathe harder even when youre doing your normal activities or when youre resting.  · You are short of breath walking up stairs or even short distances.  · You wake up at night short of breath or coughing.  · You need to use more pillows or sit up to sleep.  · You wake up tired or restless.  Other warning signs  · You feel weaker, dizzy, or more tired.  · You have chest pain or changes in your heartbeat.  · You have a cough that wont go  away.  · You cant remember things or dont feel like eating.  Tracking your weight  Gaining weight is often the first warning sign that heart failure is getting worse. Gaining even a few pounds can be a sign that your body is retaining excess water and salt. Weighing yourself each day in the morning after you urinate and before you eat, is the best way to know if you're retaining water. Get a scale that is easy to read and make sure you wear the same clothes and use the same scale every time you weigh. Your healthcare provider will show you how to track your weight. Call your doctor if you gain more than 2 pounds in 1 day, 5 pounds in 1 week, or whatever weight gain you were told to report by your doctor. This is often a sign of worsening heart failure and needs to be evaluated and treated before it compromises your breathing. Your doctor will tell you what to do next.    © 7277-7611 "[employer]. [address] [place] [address]. All rights reserved. This information is not intended as a substitute for professional medical care. Always follow your healthcare professional's instructions.              Xalelto Information     Rivaroxaban Oral tablet  What is this medicine?  RIVAROXABAN (ri va WILLIAN a ban) is an anticoagulant (blood thinner). It is used to treat blood clots in the lungs or in the veins. It is also used after knee or hip surgeries to prevent blood clots. It is also used to lower the chance of stroke in people with a medical condition called atrial fibrillation.  This medicine may be used for other purposes; ask your health care provider or pharmacist if you have questions.  What should I tell my health care provider before I take this medicine?  They need to know if you have any of these conditions:  · bleeding disorders  · bleeding in the brain  · blood in your stools (black or tarry stools) or if you have blood in your vomit  · history of stomach bleeding  · kidney  disease  · liver disease  · low blood counts, like low white cell, platelet, or red cell counts  · recent or planned spinal or epidural procedure  · take medicines that treat or prevent blood clots  · an unusual or allergic reaction to rivaroxaban, other medicines, foods, dyes, or preservatives  · pregnant or trying to get pregnant  · breast-feeding  How should I use this medicine?  Take this medicine by mouth with a glass of water. Follow the directions on the prescription label. Take your medicine at regular intervals. Do not take it more often than directed. Do not stop taking except on your doctor's advice. Stopping this medicine may increase your risk of a blood clot. Be sure to refill your prescription before you run out of medicine.  If you are taking this medicine after hip or knee replacement surgery, take it with or without food. If you are taking this medicine for atrial fibrillation, take it with your evening meal. If you are taking this medicine to treat blood clots, take it with food at the same time each day. If you are unable to swallow your tablet, you may crush the tablet and mix it in applesauce. Then, immediately eat the applesauce. You should eat more food right after you eat the applesauce containing the crushed tablet.  Talk to your pediatrician regarding the use of this medicine in children. Special care may be needed.  Overdosage: If you think you have taken too much of this medicine contact a poison control center or emergency room at once.  NOTE: This medicine is only for you. Do not share this medicine with others.  What if I miss a dose?  If you take your medicine once a day and miss a dose, take the missed dose as soon as you remember. If you take your medicine twice a day and miss a dose, take the missed dose immediately. In this instance, 2 tablets may be taken at the same time. The next day you should take 1 tablet twice a day as directed.  What may interact with this  medicine?  · aspirin and aspirin-like medicines  · certain antibiotics like erythromycin, azithromycin, and clarithromycin  · certain medicines for fungal infections like ketoconazole and itraconazole  · certain medicines for irregular heart beat like amiodarone, quinidine, dronedarone  · certain medicines for seizures like carbamazepine, phenytoin  · certain medicines that treat or prevent blood clots like warfarin, enoxaparin, and dalteparin  · conivaptan  · diltiazem  · felodipine  · indinavir  · lopinavir; ritonavir  · NSAIDS, medicines for pain and inflammation, like ibuprofen or naproxen  · ranolazine  · rifampin  · ritonavir  · Romel's wort  · verapamil  This list may not describe all possible interactions. Give your health care provider a list of all the medicines, herbs, non-prescription drugs, or dietary supplements you use. Also tell them if you smoke, drink alcohol, or use illegal drugs. Some items may interact with your medicine.  What should I watch for while using this medicine?  Visit your doctor or health care professional for regular checks on your progress. Your condition will be monitored carefully while you are receiving this medicine.  Notify your doctor or health care professional and seek emergency treatment if you develop breathing problems; changes in vision; chest pain; severe, sudden headache; pain, swelling, warmth in the leg; trouble speaking; sudden numbness or weakness of the face, arm, or leg. These can be signs that your condition has gotten worse.  If you are going to have surgery, tell your doctor or health care professional that you are taking this medicine.  Tell your health care professional that you use this medicine before you have a spinal or epidural procedure. Sometimes people who take this medicine have bleeding problems around the spine when they have a spinal or epidural procedure. This bleeding is very rare. If you have a spinal or epidural procedure while on this  medicine, call your health care professional immediately if you have back pain, numbness or tingling (especially in your legs and feet), muscle weakness, paralysis, or loss of bladder or bowel control.  Avoid sports and activities that might cause injury while you are using this medicine. Severe falls or injuries can cause unseen bleeding. Be careful when using sharp tools or knives. Consider using an electric razor. Take special care brushing or flossing your teeth. Report any injuries, bruising, or red spots on the skin to your doctor or health care professional.  What side effects may I notice from receiving this medicine?  Side effects that you should report to your doctor or health care professional as soon as possible:  · allergic reactions like skin rash, itching or hives, swelling of the face, lips, or tongue  · back pain  · redness, blistering, peeling or loosening of the skin, including inside the mouth  · signs and symptoms of bleeding such as bloody or black, tarry stools; red or dark-brown urine; spitting up blood or brown material that looks like coffee grounds; red spots on the skin; unusual bruising or bleeding from the eye, gums, or nose  Side effects that usually do not require medical attention (Report these to your doctor or health care professional if they continue or are bothersome.):  · dizziness  · muscle pain  This list may not describe all possible side effects. Call your doctor for medical advice about side effects. You may report side effects to FDA at 4-778-FDA-0085.  Where should I keep my medicine?  Keep out of the reach of children.  Store at room temperature between 15 and 30 degrees C (59 and 86 degrees F). Throw away any unused medicine after the expiration date.  NOTE: This sheet is a summary. It may not cover all possible information. If you have questions about this medicine, talk to your doctor, pharmacist, or health care provider.  NOTE:This sheet is a summary. It may not  cover all possible information. If you have questions about this medicine, talk to your doctor, pharmacist, or health care provider. Copyright© 2016 [employer]               [facility] complies with applicable Federal civil rights laws and does not discriminate on the basis of race, color, national origin, age, disability, or sex.

## 2017-01-19 NOTE — TRANSFER OF CARE
"Anesthesia Transfer of Care Note    Patient: Ayla Dela Cruz    Procedure(s) Performed: Procedure(s) (LRB):  COLONOSCOPY and EGD (N/A)  ESOPHAGOGASTRODUODENOSCOPY (EGD) (N/A)    Patient location: GI    Anesthesia Type: general    Transport from OR: Transported from OR on 2-3 L/min O2 by NC with adequate spontaneous ventilation    Post pain: adequate analgesia    Post assessment: no apparent anesthetic complications and tolerated procedure well    Post vital signs: stable    Level of consciousness: awake, alert and oriented    Nausea/Vomiting: no nausea/vomiting    Complications: none          Last vitals:   Visit Vitals    BP (!) 178/80 (BP Location: Left arm, Patient Position: Lying, BP Method: Automatic)    Pulse 103    Temp 36.5 °C (97.7 °F) (Oral)    Resp 15    Ht 5' 7" (1.702 m)    Wt 59.9 kg (132 lb)    SpO2 98%    Breastfeeding No    BMI 20.67 kg/m2     "

## 2017-01-19 NOTE — DISCHARGE INSTRUCTIONS
Understanding Colon and Rectal Polyps  The colon (also called the large intestine) is a muscular tube that forms the last part of the digestive tract. It absorbs water and stores food waste. The colon is about 4 to 6 feet long. The rectum is the last 6 inches of the colon. The colon and rectum have a smooth lining composed of millions of cells. Changes in these cells can lead to growths in the colon that can become cancerous and should be removed.     The colon has a smooth lining composed of millions of cells.     When the Colon Lining Changes  Changes that occur in the cells that line the colon or rectum can lead to growths called polyps. Over a period of years, polyps can turn cancerous. Removing polyps early may prevent cancer from ever forming.       Polyps  Polyps are fleshy clumps of tissue that form on the lining of the colon or rectum. Small polyps are usually benign (not cancerous). However, over time, cells in a polyp can change and become cancerous. Certain types of polyps known as adenomatous polyps are premalignant. The risk for invasive cancer increases with the size of the polyp and certain cell and gene features. This means that they can become cancerous if they're not removed. Hyperplastic polyps are benign. They can grow quite large and not turn cancerous.     Cancer  Almost all colorectal cancers start when polyp cells begin growing abnormally. As a cancerous tumor grows, it may involve more and more of the colon or rectum. In time, cancer can also grow beyond the colon or rectum and spread to nearby organs or to glands called lymph nodes. The cells can also travel to other parts of the body. This is known as metastasis. The earlier a cancerous tumor is removed, the better the chance of preventing its spread.  © 5508-0150 [employer]. [address] [place] [address], [place] [address]. All rights reserved. This information is not intended as a substitute for professional medical care.  Always follow your healthcare professional's instructions.        Gastritis (Adult)    Gastritis is inflammation and irritation of the stomach lining. It can be present for a short time (acute) or be long lasting (chronic). Gastritis is often caused by infection with bacteria called H pylori. More than a third of people in the [place] have this bacteria in their bodies. In many cases, H pylori causes no problems or symptoms. In some people, though, the infection irritates the stomach lining and causes gastritis. Other causes of stomach irritation include drinking alcohol or taking pain-relieving medicines called NSAIDs (such as aspirin or ibuprofen).   Symptoms of gastritis can include:  · Abdominal pain or bloating  · Loss of appetite  · Nausea or vomiting  · Vomiting blood or having black stools  · Feeling more tired than usual  An inflamed and irritated stomach lining is more likely to develop a sore called an ulcer. To help prevent this, gastritis should be treated.  Home care  If needed, medicines may be prescribed. If you have H pylori infection, treating it will likely relieve your symptoms. Other changes can help reduce stomach irritation and help it heal.  · If you have been prescribed medicines for H pylori infection, take them as directed. Take all of the medicine until it is finished or your healthcare provider tells you to stop, even if you feel better.  · Your healthcare provider may recommend avoiding NSAIDs. If you take daily aspirin for your heart or other medical reasons, do not stop without talking to your healthcare provider first.  · Avoid drinking alcohol.  · Stop smoking. Smoking can irritate the stomach and delay healing. As much as possible, stay away from second hand smoke.  Follow-up care  Follow up with your healthcare provider, or as advised by our staff. Testing may be needed to check for inflammation or an ulcer.  When to seek medical advice  Call your healthcare provider for any of the  following:  · Stomach pain that gets worse or moves to the lower right abdomen (appendix area)  · Chest pain that appears or gets worse, or spreads to the back, neck, shoulder, or arm  · Frequent vomiting (cant keep down liquids)  · Blood in the stool or vomit (red or black in color)  · Feeling weak or dizzy  · Fever of 100.4ºF (38ºC) or higher, or as directed by your healthcare provider  © 2087-9855 [employer]. [address] [place] [address]. All rights reserved. This information is not intended as a substitute for professional medical care. Always follow your healthcare professional's instructions.

## 2017-01-19 NOTE — H&P
Endoscopy H&P    Procedure :  EGD and Colonoscopy      asymptomatic screening exam and GERD      Past Medical History   Diagnosis Date    Anxiety     Arthritis     Atrial fibrillation     Cancer      skin    Depression     DVT (deep venous thrombosis)     GERD (gastroesophageal reflux disease)     Glaucoma (increased eye pressure)     Hyperlipidemia      diet controlled    Hypertension     Interstitial lung disease     Pneumonia 1/31/2014    Stroke 6-3-14    Stroke     TIA (transient ischemic attack)     TIA (transient ischemic attack)              Review of Systems -ROS:  GENERAL: No fever, chills, fatigability or weight loss.  CHEST: Denies AVILA, cyanosis, wheezing, cough and sputum production.  CARDIOVASCULAR: Denies chest pain, PND, orthopnea or reduced exercise tolerance.   Musculoskeletal ROS: negative for - gait disturbance or joint pain  Neurological ROS: negative for - confusion or memory loss        Physical Exam:  General: well developed, well nourished, no distress  Head: normocephalic  Neck: supple, symmetrical, trachea midline  Lungs:  clear to auscultation bilaterally and normal respiratory effort  Heart: regular rate and rhythm, S1, S2 normal, no murmur, rub or gallop and regular rate and rhythm  Abdomen: soft, non-tender non-distented; bowel sounds normal; no masses,  no organomegaly  Extremities: no cyanosis or edema, or clubbing       Moderate Sedation (choice): Mallampati Score 1    ASA : II    IMP: asymptomatic screening exam and GERD    Plan: EGD and Colonoscopy with Moderate sedation.  I have explained the procedure including indications, alternatives, expected outcomes and potential complications. The patient appears to understand and gives informed consent. The patient is medically ready for surgery.

## 2017-01-20 ENCOUNTER — HOSPITAL ENCOUNTER (OUTPATIENT)
Dept: RADIOLOGY | Facility: CLINIC | Age: 72
Discharge: HOME | End: 2017-01-20
Attending: FAMILY MEDICINE
Payer: MEDICARE

## 2017-01-20 VITALS
DIASTOLIC BLOOD PRESSURE: 71 MMHG | BODY MASS INDEX: 20.72 KG/M2 | HEART RATE: 85 BPM | OXYGEN SATURATION: 98 % | RESPIRATION RATE: 23 BRPM | SYSTOLIC BLOOD PRESSURE: 142 MMHG | TEMPERATURE: 98 F | HEIGHT: 67 IN | WEIGHT: 132 LBS

## 2017-01-20 DIAGNOSIS — Z78.0 POST-MENOPAUSAL: ICD-10-CM

## 2017-01-20 PROCEDURE — 77080 DXA BONE DENSITY AXIAL: CPT | Mod: TC,PO

## 2017-01-20 PROCEDURE — 77080 DXA BONE DENSITY AXIAL: CPT | Mod: 26,,, | Performed by: RADIOLOGY

## 2017-01-20 RX ORDER — LOSARTAN POTASSIUM 100 MG/1
TABLET ORAL
Qty: 90 TABLET | Refills: 2 | Status: SHIPPED | OUTPATIENT
Start: 2017-01-20 | End: 2017-10-17 | Stop reason: SDUPTHER

## 2017-01-26 NOTE — OR NURSING
Pt called endoscopy and reports has rectal bleeding (approx 2-3 oz) today after bowel movement s/p colonoscopy with polyp removal on Jan 19, 2017.  Dr Schultz notified by telephone and reports will talk to patient and ok to give pt cell phone number.  Pt given phone number and instructed to call back or go to emergency room for any problems

## 2017-01-27 ENCOUNTER — HOSPITAL ENCOUNTER (EMERGENCY)
Facility: HOSPITAL | Age: 72
Discharge: HOME | End: 2017-01-27
Attending: EMERGENCY MEDICINE
Payer: MEDICARE

## 2017-01-27 VITALS
HEIGHT: 67 IN | RESPIRATION RATE: 18 BRPM | TEMPERATURE: 98 F | BODY MASS INDEX: 20.72 KG/M2 | HEART RATE: 83 BPM | WEIGHT: 132 LBS | SYSTOLIC BLOOD PRESSURE: 157 MMHG | DIASTOLIC BLOOD PRESSURE: 76 MMHG | OXYGEN SATURATION: 100 %

## 2017-01-27 DIAGNOSIS — D64.9 ANEMIA, UNSPECIFIED TYPE: ICD-10-CM

## 2017-01-27 DIAGNOSIS — K92.1 HEMATOCHEZIA: Primary | ICD-10-CM

## 2017-01-27 LAB
ALBUMIN SERPL BCP-MCNC: 3.5 G/DL
ALP SERPL-CCNC: 53 U/L
ALT SERPL W/O P-5'-P-CCNC: 8 U/L
ANION GAP SERPL CALC-SCNC: 11 MMOL/L
APTT BLDCRRT: 25.4 SEC
AST SERPL-CCNC: 17 U/L
BASOPHILS # BLD AUTO: 0 K/UL
BASOPHILS NFR BLD: 0.4 %
BILIRUB SERPL-MCNC: 0.5 MG/DL
BUN SERPL-MCNC: 26 MG/DL
CALCIUM SERPL-MCNC: 9.1 MG/DL
CHLORIDE SERPL-SCNC: 110 MMOL/L
CO2 SERPL-SCNC: 21 MMOL/L
CREAT SERPL-MCNC: 0.8 MG/DL
DIFFERENTIAL METHOD: ABNORMAL
EOSINOPHIL # BLD AUTO: 0.2 K/UL
EOSINOPHIL NFR BLD: 2.9 %
ERYTHROCYTE [DISTWIDTH] IN BLOOD BY AUTOMATED COUNT: 15.9 %
EST. GFR  (AFRICAN AMERICAN): >60 ML/MIN/1.73 M^2
EST. GFR  (NON AFRICAN AMERICAN): >60 ML/MIN/1.73 M^2
GLUCOSE SERPL-MCNC: 113 MG/DL
HCT VFR BLD AUTO: 29.6 %
HGB BLD-MCNC: 9.6 G/DL
INR PPP: 1.1
LYMPHOCYTES # BLD AUTO: 0.7 K/UL
LYMPHOCYTES NFR BLD: 9.8 %
MCH RBC QN AUTO: 29.4 PG
MCHC RBC AUTO-ENTMCNC: 32.3 %
MCV RBC AUTO: 91 FL
MONOCYTES # BLD AUTO: 0.7 K/UL
MONOCYTES NFR BLD: 9.7 %
NEUTROPHILS # BLD AUTO: 5.3 K/UL
NEUTROPHILS NFR BLD: 77.2 %
PLATELET # BLD AUTO: 265 K/UL
PMV BLD AUTO: 7 FL
POTASSIUM SERPL-SCNC: 3.6 MMOL/L
PROT SERPL-MCNC: 6 G/DL
PROTHROMBIN TIME: 11.4 SEC
RBC # BLD AUTO: 3.25 M/UL
SODIUM SERPL-SCNC: 142 MMOL/L
WBC # BLD AUTO: 6.8 K/UL

## 2017-01-27 PROCEDURE — 85730 THROMBOPLASTIN TIME PARTIAL: CPT

## 2017-01-27 PROCEDURE — 85610 PROTHROMBIN TIME: CPT

## 2017-01-27 PROCEDURE — 80053 COMPREHEN METABOLIC PANEL: CPT

## 2017-01-27 PROCEDURE — 85025 COMPLETE CBC W/AUTO DIFF WBC: CPT

## 2017-01-27 PROCEDURE — 99283 EMERGENCY DEPT VISIT LOW MDM: CPT

## 2017-01-27 PROCEDURE — 36415 COLL VENOUS BLD VENIPUNCTURE: CPT

## 2017-01-27 NOTE — DISCHARGE INSTRUCTIONS
Anemia  Anemia is a condition that occurs when your body does not have enough healthy red blood cells (RBCs). Your RBCs are the parts of your blood that carry oxygen throughout your body. A protein called hemoglobin allows your RBCs to absorb and release oxygen. Without enough RBCs or hemoglobin, your body doesn't get enough oxygen. Symptoms of anemia may then occur.    Symptoms of anemia  Some people with anemia have no symptoms. But most people have symptoms that range from mild to severe. These can include:  · Tiredness (fatigue)  · Weakness  · Pale skin  · Shortness of breath  · Dizziness or fainting  · Rapid heartbeat  · Trouble doing normal amounts of activity  · Jaundice (yellowing of your eyes, skin, or mouth; dark urine)  Causes of anemia  Anemia can occur when your body:  · Loses too much blood  · Does not make enough RBCs  · Destroys your RBCs at a faster rate than it can replace them  · Does not make a normal amount of hemoglobin in your RBCs  These problems can occur for many reasons, including:  · A condition that you are born with (congenital or inherited). This includes sickle cell disease or thalassemia.  · Heavy bleeding for any reason, including injury, surgery, childbirth, or even heavy menstrual periods.  · Being low in certain nutrients, such as iron, folate, or vitamin B12. This may be due to poor diet. Also, a condition like celiac disease or Crohn's disease can cause poor absorption of these nutrients  · Certain chronic conditions like diabetes, arthritis, or kidney disease.  · Certain chronic infections like tuberculosis or HIV.  · Exposure to certain medications, such as those used for chemotherapy.  There are different types of anemia. Your doctor can tell you more about the type of anemia you have and what may have caused it.  Diagnosing anemia  To diagnose anemia, your doctor gives you blood tests. These can include:  · Complete blood cell count (CBC). This test measures the amounts  of the different types of blood cells.  · Blood smear. This test checks the size and shape of your blood cells. To perform the test, your doctor views a drop of your blood under a microscope. Your doctor uses a stain to make the blood cells easier to see.  · Iron studies. These tests measure the amount of iron in your blood. Your body needs iron to make hemoglobin in your RBCs.  · Vitamin B12 and folate studies. These tests check for some of the components that help give RBCs a normal size and shape.  · Reticulocyte count. This test measures the amount of new RBCs that your bone marrow makes.  · Hemoglobin electrophoresis. This test checks for problems with your hemoglobin in RBCs.  Treating anemia  Treatment for anemia is based on the type of anemia, its cause, and the severity of your symptoms. Treatments may include:  · Diet changes. This involves increasing the amount of certain nutrients in your diet, such as iron, vitamin B12, or folate. Your doctor may also prescribe nutrient supplements.  · Medications. Certain medications treat the cause of your anemia. Others help build new RBCs or relieve symptoms. If a medication is the cause of your anemia, you may need to stop or change it.  · Blood transfusions. Replacing some of your blood can increase the number of healthy RBCs in your body.  · Surgery. In some cases, your doctor can do surgery to treat the underlying cause of anemia. If you need surgery, your doctor will explain the procedure and outline the risks and benefits for you.  Long-term concerns  If you have a certain type of anemia, you can expect a full recovery after treatment. If you have other types of anemia (especially a type you're born with), you will need to manage it for life. Your doctor can tell you more.  © 4170-3138 [employer]. [address] [place] [address], [place] [address]. All rights reserved. This information is not intended as a substitute for professional medical care. Always  follow your healthcare professional's instructions.

## 2017-01-27 NOTE — ED PROVIDER NOTES
Encounter Date: 1/27/2017    SCRIBE #1 NOTE: IUziel, am scribing for, and in the presence of, Dr. Solares.       History     Chief Complaint   Patient presents with    Rectal Bleeding     recent colonoscopy / polyp removal     Review of patient's allergies indicates:   Allergen Reactions    Atorvastatin      Other reaction(s): Joint pain    Augmentin [amoxicillin-pot clavulanate]      Other reaction(s): Mental Status Change    Baclofen (bulk) Nausea And Vomiting    Ciprofloxacin (bulk)     Decongest tabs      Other reaction(s): increased heart rate    Erythromycin Other (See Comments)    Fluoxetine      Other reaction(s): heart palpitations  Other reaction(s): anxiety    Lisinopril Other (See Comments)     cough    Morphine Other (See Comments)     confusion    Venlafaxine analogues      Changes in BP and increases heart rate       Afrin [oxymetazoline] Palpitations    Caffeine Palpitations     HPI Comments: 01/27/2017  9:42 AM     Chief Complaint: Anal bleeding      Ayla Neil is a 71 y.o. female with a PMHx of  Anxiety; Arthritis; Atrial fibrillation; Cancer; Depression; DVT; GERD; Hyperlipidemia; HTN; Interstitial lung disease; Pneumonia; Stroke; TIA who is presenting to the ED with a sudden onset of acute anal bleeding beginning 1 day ago. The pt reported she had a colonoscopy done with a polyp removed 8 days ago. She stated the bleeding has slightly resolved. The pt noted the blood is present only when she has a BM. Associated symptom of abdominal pain. She denied chest pain and SOB. The pt provided this is the first occurrence of anal bleeding. She has a past surgical history that includes 2 heart ablations; bilateral cataracts; pyloristenosis; Tonsillectomy; skin cancer removal ; Cholecystectomy; and Colonoscopy.    The history is provided by the patient.     Past Medical History   Diagnosis Date    Anxiety     Arthritis     Atrial fibrillation     Cancer      skin     Depression     DVT (deep venous thrombosis)     GERD (gastroesophageal reflux disease)     Glaucoma (increased eye pressure)     Hyperlipidemia      diet controlled    Hypertension     Interstitial lung disease     Pneumonia 1/31/2014    Stroke 6-3-14    Stroke     TIA (transient ischemic attack)     TIA (transient ischemic attack)      Past Medical History Pertinent Negatives   Diagnosis Date Noted    Abnormal Pap smear 8/22/2012    Breast disorder 8/22/2012    Bunion 4/30/2012    Callus 4/30/2012    Corns and callosities 4/30/2012    Dermatitis 4/30/2012    Diabetes mellitus 4/30/2012    Difficulty in walking(719.7) 4/30/2012    Foot ulcer 4/30/2012    Gout 4/30/2012    Rash 4/30/2012    Tinea pedis 4/30/2012    Verruca 4/30/2012     Past Surgical History   Procedure Laterality Date    2 heart ablations      Bilateral cataracts      Pyloristenosis      Tonsillectomy      Skin cancer removal       Cholecystectomy      Colonoscopy N/A 1/19/2017     Procedure: COLONOSCOPY and EGD;  Surgeon: Jonathan Schultz MD;  [facility];  Service: Endoscopy;  Laterality: N/A;     Family History   Problem Relation Age of Onset    Heart disease Father     Ulcers Father     Arthritis Mother     Asthma Mother     Rheum arthritis Mother     Pneumonia Mother     Depression Son     Alzheimer's disease Maternal Uncle     Rheum arthritis Maternal Grandmother     Emphysema Maternal Grandfather     Cancer Maternal Grandfather      kidney    Kidney disease Maternal Grandfather     Cancer Paternal Grandmother      lung= smoker    Pneumonia Paternal Grandfather     Breast cancer Neg Hx     Ovarian cancer Neg Hx      Social History   Substance Use Topics    Smoking status: Never Smoker    Smokeless tobacco: Never Used    Alcohol use No     Review of Systems   Constitutional: Negative for fever.   HENT: Negative for sore throat.    Respiratory: Negative for shortness of breath.     Cardiovascular: Negative for chest pain.   Gastrointestinal: Positive for abdominal pain and anal bleeding. Negative for nausea.   Genitourinary: Negative for dysuria.   Musculoskeletal: Negative for back pain.   Skin: Negative for rash.   Neurological: Negative for weakness.   Hematological: Does not bruise/bleed easily.       Physical Exam   Initial Vitals   BP Pulse Resp Temp SpO2   01/27/17 0854 01/27/17 0854 01/27/17 0854 01/27/17 0854 01/27/17 0854   168/71 89 20 98.1 °F (36.7 °C) 100 %     Physical Exam    Nursing note and vitals reviewed.  Constitutional: She appears well-developed.   HENT:   Head: Normocephalic and atraumatic.   Mouth/Throat: Oropharynx is clear and moist.   Eyes: Conjunctivae are normal.   Neck: Neck supple.   Cardiovascular: Normal rate, regular rhythm, normal heart sounds and intact distal pulses. Exam reveals no gallop and no friction rub.    No murmur heard.  Pulmonary/Chest: Breath sounds normal. She has no wheezes. She has no rhonchi. She has no rales.   Abdominal: Soft. She exhibits no distension. There is no tenderness.   Genitourinary:   Genitourinary Comments: External hemorrhoids. No active bleeding. Gross blood present in her rectum.   Musculoskeletal: Normal range of motion.   Neurological: She is alert and oriented to person, place, and time.   Skin: No rash noted. No erythema.   Psychiatric: She has a normal mood and affect.         ED Course   Procedures  Labs Reviewed   COMPREHENSIVE METABOLIC PANEL - Abnormal; Notable for the following:        Result Value    CO2 21 (*)     Glucose 113 (*)     BUN, Bld 26 (*)     Alkaline Phosphatase 53 (*)     ALT 8 (*)     All other components within normal limits   CBC W/ AUTO DIFFERENTIAL - Abnormal; Notable for the following:     RBC 3.25 (*)     Hemoglobin 9.6 (*)     Hematocrit 29.6 (*)     RDW 15.9 (*)     MPV 7.0 (*)     Lymph # 0.7 (*)     Gran% 77.2 (*)     Lymph% 9.8 (*)     All other components within normal limits   APTT    PROTIME-INR             Medical Decision Making:   History:   Old Records Summarized: records from previous admission(s).  Clinical Tests:   Lab Tests: Ordered and Reviewed  The following lab test(s) were unremarkable: CBC, CMP, PT and PTT  ED Management:  Ayla Dela Cruz is a 71 y.o. female who presents with  2 day history of rectal bleeding status post recent colonoscopy with polyp resection.  She reports that the bleeding has diminished.  She recently discontinued Xarelto.  She has had a substantial blood loss with a hemoglobin which is decreased from 12-9.  I discussed the case with Dr. Schultz who feels that since the bleeding has decreased and there has been increased timeframe since the discontinuation of Xarelto is reasonable to discharge the patient.  She agrees to return immediately for worsening bleeding, shortness of breath or dizziness.  Other:   I have discussed this case with another health care provider.            Scribe Attestation:   Scribe #1: I performed the above scribed service and the documentation accurately describes the services I performed. I attest to the accuracy of the note.    Attending Attestation:           Physician Attestation for Scribe:  Physician Attestation Statement for Scribe #1: I, Dr. Solares, reviewed documentation, as scribed by Uziel Arreola in my presence, and it is both accurate and complete.                 ED Course     Clinical Impression:   The primary encounter diagnosis was Hematochezia. A diagnosis of Anemia, unspecified type was also pertinent to this visit.          Rolando Solares III, MD  01/27/17 6232

## 2017-01-27 NOTE — ED AVS SNAPSHOT
[facility] [address] [place] [address]               Ayla Dela Cruz   2017  8:57 AM   ED    Description:  Female : 1945   Department:  [facility]           Your Care was Coordinated By:     Provider Role From To    Rolando Solares III, MD Attending Provider 17 0925 --      Reason for Visit     Rectal Bleeding           Diagnoses this Visit        Comments    Hematochezia    -  Primary     Anemia, unspecified type           ED Disposition     None           To Do List           Follow-up Information     Follow up with Jonathan Schultz MD In 3 days.    Specialty:  Colon and Rectal Surgery    Contact information:    Yaneth SALAZAR  [facility] [address]  [phone]        [facility]     [facility] -  Assistance  Registered nurses in the [facility] provide clinical advisement, health education, appointment booking, and other advisory services.  Call for this free service at [phone].             Medications           Message regarding Medications     Verify the changes and/or additions to your medication regime listed below are the same as discussed with your clinician today.  If any of these changes or additions are incorrect, please notify your healthcare provider.        STOP taking these medications     XARELTO 20 mg Tab TAKE 1 TABLET (20 MG TOTAL) BY MOUTH DAILY WITH DINNER OR EVENING MEAL.    salsalate (DISALCID) 500 MG Tab 500 mg once daily.            Verify that the below list of medications is an accurate representation of the medications you are currently taking.  If none reported, the list may be blank. If incorrect, please contact your healthcare provider. Carry this list with you in case of emergency.           Current Medications     ammonium lactate (LAC-HYDRIN) 12 % lotion     clobetasol (TEMOVATE) 0.05 % cream APPLY TWICE DAILY TO RASH UP TO 2 WEEKS/MONTH  "AS NEEDED.    dorzolamide-timolol (COSOPT) 2-0.5 % ophthalmic solution Place 1 drop into both eyes 2 (two) times daily. Twice a day    esomeprazole (NEXIUM) 40 MG capsule TAKE 1 CAPSULE (40 MG TOTAL) BY MOUTH ONCE DAILY. EVERY DAY    flecainide (TAMBOCOR) 100 MG Tab TAKE 1 TABLET (100 MG TOTAL) BY MOUTH EVERY 12 (TWELVE) HOURS.    lorazepam (ATIVAN) 1 MG tablet Take 1 tablet (1 mg total) by mouth 3 (three) times daily as needed for Anxiety. anxiety    losartan (COZAAR) 100 MG tablet TAKE 1 TABLET (100 MG TOTAL) BY MOUTH ONCE DAILY.    polyethylene glycol (GLYCOLAX) 17 gram PwPk Take 17 g by mouth as needed.    rosuvastatin (CRESTOR) 40 MG Tab Take 1 tablet (40 mg total) by mouth every evening.    VIIBRYD 40 mg Tab tablet Take 40 mg by mouth once daily.           Clinical Reference Information           Your Vitals Were     BP Pulse Temp Resp Height Weight    168/71 89 98.1 °F (36.7 °C) (Oral) 20 5' 7" (1.702 m) 59.9 kg (132 lb)    SpO2 BMI             100% 20.67 kg/m2         Allergies as of 1/27/2017        Reactions    Atorvastatin     Other reaction(s): Joint pain    Augmentin [Amoxicillin-pot Clavulanate]     Other reaction(s): Mental Status Change    Baclofen (Bulk) Nausea And Vomiting    Ciprofloxacin (Bulk)     Decongest Tabs     Other reaction(s): increased heart rate    Erythromycin Other (See Comments)    Fluoxetine     Other reaction(s): heart palpitations  Other reaction(s): anxiety    Lisinopril Other (See Comments)    cough    Morphine Other (See Comments)    confusion    Venlafaxine Analogues     Changes in BP and increases heart rate       Afrin [Oxymetazoline] Palpitations    Caffeine Palpitations      Immunizations Administered on Date of Encounter - 1/27/2017     None      ED Micro, Lab, POCT     Start Ordered       Status Ordering Provider    01/27/17 0905 01/27/17 0904  APTT  STAT      Final result     01/27/17 0905 01/27/17 0904  Protime-INR  STAT      Final result     01/27/17 0905 01/27/17 " 0904  Comprehensive metabolic panel  STAT      Final result     01/27/17 0905 01/27/17 0904  CBC auto differential  STAT      Final result       ED Imaging Orders     None        Discharge Instructions         Anemia  Anemia is a condition that occurs when your body does not have enough healthy red blood cells (RBCs). Your RBCs are the parts of your blood that carry oxygen throughout your body. A protein called hemoglobin allows your RBCs to absorb and release oxygen. Without enough RBCs or hemoglobin, your body doesn't get enough oxygen. Symptoms of anemia may then occur.    Symptoms of anemia  Some people with anemia have no symptoms. But most people have symptoms that range from mild to severe. These can include:  · Tiredness (fatigue)  · Weakness  · Pale skin  · Shortness of breath  · Dizziness or fainting  · Rapid heartbeat  · Trouble doing normal amounts of activity  · Jaundice (yellowing of your eyes, skin, or mouth; dark urine)  Causes of anemia  Anemia can occur when your body:  · Loses too much blood  · Does not make enough RBCs  · Destroys your RBCs at a faster rate than it can replace them  · Does not make a normal amount of hemoglobin in your RBCs  These problems can occur for many reasons, including:  · A condition that you are born with (congenital or inherited). This includes sickle cell disease or thalassemia.  · Heavy bleeding for any reason, including injury, surgery, childbirth, or even heavy menstrual periods.  · Being low in certain nutrients, such as iron, folate, or vitamin B12. This may be due to poor diet. Also, a condition like celiac disease or Crohn's disease can cause poor absorption of these nutrients  · Certain chronic conditions like diabetes, arthritis, or kidney disease.  · Certain chronic infections like tuberculosis or HIV.  · Exposure to certain medications, such as those used for chemotherapy.  There are different types of anemia. Your doctor can tell you more about the type of  anemia you have and what may have caused it.  Diagnosing anemia  To diagnose anemia, your doctor gives you blood tests. These can include:  · Complete blood cell count (CBC). This test measures the amounts of the different types of blood cells.  · Blood smear. This test checks the size and shape of your blood cells. To perform the test, your doctor views a drop of your blood under a microscope. Your doctor uses a stain to make the blood cells easier to see.  · Iron studies. These tests measure the amount of iron in your blood. Your body needs iron to make hemoglobin in your RBCs.  · Vitamin B12 and folate studies. These tests check for some of the components that help give RBCs a normal size and shape.  · Reticulocyte count. This test measures the amount of new RBCs that your bone marrow makes.  · Hemoglobin electrophoresis. This test checks for problems with your hemoglobin in RBCs.  Treating anemia  Treatment for anemia is based on the type of anemia, its cause, and the severity of your symptoms. Treatments may include:  · Diet changes. This involves increasing the amount of certain nutrients in your diet, such as iron, vitamin B12, or folate. Your doctor may also prescribe nutrient supplements.  · Medications. Certain medications treat the cause of your anemia. Others help build new RBCs or relieve symptoms. If a medication is the cause of your anemia, you may need to stop or change it.  · Blood transfusions. Replacing some of your blood can increase the number of healthy RBCs in your body.  · Surgery. In some cases, your doctor can do surgery to treat the underlying cause of anemia. If you need surgery, your doctor will explain the procedure and outline the risks and benefits for you.  Long-term concerns  If you have a certain type of anemia, you can expect a full recovery after treatment. If you have other types of anemia (especially a type you're born with), you will need to manage it for life. Your doctor can  tell you more.  © 4396-9236 [employer]. [address] [place] [address], [place] [address]. All rights reserved. This information is not intended as a substitute for professional medical care. Always follow your healthcare professional's instructions.          Discharge References/Attachments     LOWER GI BLEEDING (STABLE) (ENGLISH)      Your Scheduled Appointments     Apr 03, 2017  3:30 PM CDT   Established Patient Visit with MD [facility] - [facility] ([place])    [address]  [place] [id number] [phone]               [facility] complies with applicable Federal civil rights laws and does not discriminate on the basis of race, color, national origin, age, disability, or sex.        Language Assistance Services     ATTENTION: Language assistance services are available, free of charge. Please call [phone].      ATENCIÓN: Si habla español, tiene a nettles disposición servicios gratuitos de asistencia lingüística. Llame al [phone].     CHÚ Ý: N?u b?n nói Ti?ng Vi?t, có các d?ch v? h? tr? ngôn ng? mi?n phí dành cho b?n. G?i s? [phone].

## 2017-03-07 ENCOUNTER — OFFICE VISIT (OUTPATIENT)
Dept: FAMILY MEDICINE | Facility: CLINIC | Age: 72
End: 2017-03-07
Payer: MEDICARE

## 2017-03-07 VITALS
SYSTOLIC BLOOD PRESSURE: 118 MMHG | TEMPERATURE: 98 F | BODY MASS INDEX: 20.9 KG/M2 | WEIGHT: 133.19 LBS | DIASTOLIC BLOOD PRESSURE: 70 MMHG | HEIGHT: 67 IN | HEART RATE: 98 BPM

## 2017-03-07 DIAGNOSIS — I10 ESSENTIAL HYPERTENSION: ICD-10-CM

## 2017-03-07 DIAGNOSIS — J01.00 ACUTE MAXILLARY SINUSITIS, RECURRENCE NOT SPECIFIED: Primary | ICD-10-CM

## 2017-03-07 PROCEDURE — 99213 OFFICE O/P EST LOW 20 MIN: CPT | Mod: S$PBB,,, | Performed by: PHYSICIAN ASSISTANT

## 2017-03-07 PROCEDURE — 96372 THER/PROPH/DIAG INJ SC/IM: CPT | Mod: PBBFAC,PO

## 2017-03-07 PROCEDURE — 99213 OFFICE O/P EST LOW 20 MIN: CPT | Mod: PBBFAC,PO,25 | Performed by: PHYSICIAN ASSISTANT

## 2017-03-07 PROCEDURE — 99999 PR PBB SHADOW E&M-EST. PATIENT-LVL III: CPT | Mod: PBBFAC,,, | Performed by: PHYSICIAN ASSISTANT

## 2017-03-07 RX ORDER — SALSALATE 500 MG/1
TABLET, FILM COATED ORAL
Refills: 3 | COMMUNITY
Start: 2017-02-05 | End: 2017-11-07

## 2017-03-07 RX ORDER — CEPHALEXIN 250 MG/1
250 CAPSULE ORAL EVERY 6 HOURS
Qty: 40 CAPSULE | Refills: 0 | Status: SHIPPED | OUTPATIENT
Start: 2017-03-07 | End: 2017-03-17

## 2017-03-07 RX ORDER — RIVAROXABAN 20 MG/1
TABLET, FILM COATED ORAL
COMMUNITY
Start: 2017-03-04 | End: 2017-04-02 | Stop reason: SDUPTHER

## 2017-03-07 RX ORDER — BETAMETHASONE SODIUM PHOSPHATE AND BETAMETHASONE ACETATE 3; 3 MG/ML; MG/ML
6 INJECTION, SUSPENSION INTRA-ARTICULAR; INTRALESIONAL; INTRAMUSCULAR; SOFT TISSUE
Status: COMPLETED | OUTPATIENT
Start: 2017-03-07 | End: 2017-03-07

## 2017-03-07 RX ADMIN — BETAMETHASONE ACETATE AND BETAMETHASONE SODIUM PHOSPHATE 6 MG: 3; 3 INJECTION, SUSPENSION INTRA-ARTICULAR; INTRALESIONAL; INTRAMUSCULAR; SOFT TISSUE at 07:03

## 2017-03-07 NOTE — PROGRESS NOTES
Subjective:       Patient ID: Ayla Dela Cruz is a 71 y.o. female.    Chief Complaint: Cough (green phlegm since last thursday)    Sinusitis   This is a new problem. The current episode started in the past 7 days. The problem is unchanged. There has been no fever. Associated symptoms include congestion, coughing and sinus pressure. Pertinent negatives include no chills, diaphoresis, ear pain, shortness of breath, sore throat or swollen glands. Treatments tried: robutussin. The treatment provided no relief.     Review of Systems   Constitutional: Negative for chills, diaphoresis, fatigue, fever and unexpected weight change.   HENT: Positive for congestion and sinus pressure. Negative for ear pain, sore throat and trouble swallowing.    Eyes: Negative for visual disturbance.   Respiratory: Positive for cough. Negative for shortness of breath and wheezing.    Cardiovascular: Negative for chest pain and palpitations.   Gastrointestinal: Negative for abdominal pain, diarrhea, nausea and vomiting.   Neurological: Negative for dizziness, weakness and light-headedness.       Objective:      Physical Exam   Constitutional: Vital signs are normal. She appears well-developed and well-nourished. No distress.   HENT:   Head: Normocephalic and atraumatic.   Right Ear: Hearing, tympanic membrane, external ear and ear canal normal.   Left Ear: Hearing, tympanic membrane, external ear and ear canal normal.   Nose: Right sinus exhibits maxillary sinus tenderness. Right sinus exhibits no frontal sinus tenderness. Left sinus exhibits maxillary sinus tenderness. Left sinus exhibits no frontal sinus tenderness.   Mouth/Throat: Uvula is midline, oropharynx is clear and moist and mucous membranes are normal.   Cardiovascular: Normal rate, regular rhythm, S1 normal, S2 normal and normal heart sounds.  Exam reveals no gallop.    No murmur heard.  Pulses:       Radial pulses are 2+ on the right side, and 2+ on the left side.   <2sec  cap refill fingers bilat     Pulmonary/Chest: Effort normal and breath sounds normal. No respiratory distress. She has no wheezes. She has no rhonchi.   Skin: Skin is warm and dry. She is not diaphoretic.   Appropriate skin turgor   Psychiatric: She has a normal mood and affect. Her speech is normal and behavior is normal. Judgment and thought content normal. Cognition and memory are normal.       Assessment:       1. Acute maxillary sinusitis, recurrence not specified    2. Essential hypertension        Plan:   Ayla was seen today for cough.    Diagnoses and all orders for this visit:    Acute maxillary sinusitis, recurrence not specified  Suggested start otc antihistamine daily  -     cephALEXin (KEFLEX) 250 MG capsule; Take 1 capsule (250 mg total) by mouth every 6 (six) hours.  -     betamethasone acetate-betamethasone sodium phosphate injection 6 mg; Inject 1 mL (6 mg total) into the muscle one time.  Take antibiotics with food.  Increase fluid intake.  C  all the clinic if symptoms worsen, new symptoms develop or if you are not any better after completion of your antibiotics.        Essential hypertension    Good control; continue current medications    Patient readiness: acceptance and barriers:none    During the course of the visit the patient was educated and counseled about the following:     Hypertension:   Medication: no change.    Goals: Hypertension: Reduce Blood Pressure    Did patient meet goals/outcomes: No    The following self management tools provided: declined    Patient Instructions (the written plan) was given to the patient/family.     Time spent with patient: 15 minutes

## 2017-03-07 NOTE — MR AVS SNAPSHOT
[facility]  [address]  [place] [address]  Phone: [phone]  Fax: [phone]                  Ayla Dela Cruz   3/7/2017 7:00 AM   Office Visit    Description:  Female : 1945   Provider:  VIOLET Dominguez   Department:  [facility]           Reason for Visit     Cough           Diagnoses this Visit        Comments    Acute maxillary sinusitis, recurrence not specified    -  Primary     Essential hypertension                To Do List           Future Appointments        Provider Department Dept Phone    4/3/2017 3:30 PM MD [place] [facility]anes [facility] - Cardiology [phone]      Goals (5 Years of Data)     None       These Medications        Disp Refills Start End    cephALEXin (KEFLEX) 250 MG capsule 40 capsule 0 3/7/2017 3/17/2017    Take 1 capsule (250 mg total) by mouth every 6 (six) hours. - Oral    Pharmacy: [facility]/pharmacy #[id number] - [place] - [address] Ph #: [phone]         [facility]     [facility] Nurse Care [facility] -  Assistance  Registered nurses in the [facility] Center provide clinical advisement, health education, appointment booking, and other advisory services.  Call for this free service at [phone].             Medications           Message regarding Medications     Verify the changes and/or additions to your medication regime listed below are the same as discussed with your clinician today.  If any of these changes or additions are incorrect, please notify your healthcare provider.        START taking these NEW medications        Refills    cephALEXin (KEFLEX) 250 MG capsule 0    Sig: Take 1 capsule (250 mg total) by mouth every 6 (six) hours.    Class: Normal    Route: Oral      These medications were administered today        Dose Freq    betamethasone acetate-betamethasone sodium phosphate injection 6 mg 6 mg Clinic/HOD 1 time    Sig: Inject 1 mL (6 mg total) into the muscle one time.     "Class: Normal    Route: Intramuscular    Cosign for Ordering: Required by Lynnette Katz MD           Verify that the below list of medications is an accurate representation of the medications you are currently taking.  If none reported, the list may be blank. If incorrect, please contact your healthcare provider. Carry this list with you in case of emergency.           Current Medications     ammonium lactate (LAC-HYDRIN) 12 % lotion     clobetasol (TEMOVATE) 0.05 % cream APPLY TWICE DAILY TO RASH UP TO 2 WEEKS/MONTH AS NEEDED.    dorzolamide-timolol (COSOPT) 2-0.5 % ophthalmic solution Place 1 drop into both eyes 2 (two) times daily. Twice a day    esomeprazole (NEXIUM) 40 MG capsule TAKE 1 CAPSULE (40 MG TOTAL) BY MOUTH ONCE DAILY. EVERY DAY    flecainide (TAMBOCOR) 100 MG Tab TAKE 1 TABLET (100 MG TOTAL) BY MOUTH EVERY 12 (TWELVE) HOURS.    lorazepam (ATIVAN) 1 MG tablet Take 1 tablet (1 mg total) by mouth 3 (three) times daily as needed for Anxiety. anxiety    losartan (COZAAR) 100 MG tablet TAKE 1 TABLET (100 MG TOTAL) BY MOUTH ONCE DAILY.    polyethylene glycol (GLYCOLAX) 17 gram PwPk Take 17 g by mouth as needed.    salsalate (DISALCID) 500 MG Tab TAKE 2 TABLETS BY MOUTH 3 TIMES A DAY AFTER MEALS AS NEEDED    VIIBRYD 40 mg Tab tablet Take 40 mg by mouth once daily.    XARELTO 20 mg Tab     cephALEXin (KEFLEX) 250 MG capsule Take 1 capsule (250 mg total) by mouth every 6 (six) hours.    rosuvastatin (CRESTOR) 40 MG Tab Take 1 tablet (40 mg total) by mouth every evening.           Clinical Reference Information           Your Vitals Were     BP Pulse Temp Height Weight BMI    118/70 98 98.1 °F (36.7 °C) 5' 7" (1.702 m) 60.4 kg (133 lb 2.5 oz) 20.86 kg/m2      Blood Pressure          Most Recent Value    BP  118/70      Allergies as of 3/7/2017     Atorvastatin    Augmentin [Amoxicillin-pot Clavulanate]    Baclofen (Bulk)    Ciprofloxacin (Bulk)    Decongest Tabs    Erythromycin    Fluoxetine    Lisinopril    " Morphine    Venlafaxine Analogues    Afrin [Oxymetazoline]    Caffeine      Immunizations Administered on Date of Encounter - 3/7/2017     None      Administrations This Visit     betamethasone acetate-betamethasone sodium phosphate injection 6 mg     Admin Date Action Dose Route Administered By             03/07/2017 Given 6 mg Intramuscular Ambika Fleming LPN                      Language Assistance Services     ATTENTION: Language assistance services are available, free of charge. Please call [phone].      ATENCIÓN: Si habla hector, tiene a nettles disposición servicios gratuitos de asistencia lingüística. Llame al [phone].     CHÚ Ý: N?u b?n nói Ti?ng Vi?t, có các d?ch v? h? tr? ngôn ng? mi?n phí dành cho b?n. G?i s? [phone].         [facility] complies with applicable Federal civil rights laws and does not discriminate on the basis of race, color, national origin, age, disability, or sex.

## 2017-04-02 ENCOUNTER — PATIENT MESSAGE (OUTPATIENT)
Dept: CARDIOLOGY | Facility: CLINIC | Age: 72
End: 2017-04-02

## 2017-04-03 ENCOUNTER — OFFICE VISIT (OUTPATIENT)
Dept: CARDIOLOGY | Facility: CLINIC | Age: 72
End: 2017-04-03
Payer: MEDICARE

## 2017-04-03 VITALS
HEART RATE: 88 BPM | BODY MASS INDEX: 21.31 KG/M2 | WEIGHT: 135.81 LBS | OXYGEN SATURATION: 98 % | DIASTOLIC BLOOD PRESSURE: 65 MMHG | HEIGHT: 67 IN | SYSTOLIC BLOOD PRESSURE: 145 MMHG

## 2017-04-03 DIAGNOSIS — I48.0 PAROXYSMAL ATRIAL FIBRILLATION: Primary | ICD-10-CM

## 2017-04-03 DIAGNOSIS — E78.01 FAMILIAL HYPERCHOLESTEROLEMIA: ICD-10-CM

## 2017-04-03 DIAGNOSIS — I50.32 CHRONIC DIASTOLIC HEART FAILURE: ICD-10-CM

## 2017-04-03 DIAGNOSIS — I10 ESSENTIAL HYPERTENSION: ICD-10-CM

## 2017-04-03 PROCEDURE — 99214 OFFICE O/P EST MOD 30 MIN: CPT | Mod: S$PBB,,, | Performed by: INTERNAL MEDICINE

## 2017-04-03 PROCEDURE — 99999 PR PBB SHADOW E&M-EST. PATIENT-LVL III: CPT | Mod: PBBFAC,,, | Performed by: INTERNAL MEDICINE

## 2017-04-03 PROCEDURE — 99213 OFFICE O/P EST LOW 20 MIN: CPT | Mod: PBBFAC,PO | Performed by: INTERNAL MEDICINE

## 2017-04-03 RX ORDER — RIVAROXABAN 20 MG/1
TABLET, FILM COATED ORAL
Qty: 30 TABLET | Refills: 6 | Status: SHIPPED | OUTPATIENT
Start: 2017-04-03 | End: 2017-10-27 | Stop reason: SDUPTHER

## 2017-04-03 RX ORDER — FLECAINIDE ACETATE 150 MG/1
150 TABLET ORAL EVERY 12 HOURS
Qty: 180 TABLET | Refills: 3 | Status: SHIPPED | OUTPATIENT
Start: 2017-04-03 | End: 2017-04-14 | Stop reason: SINTOL

## 2017-04-03 NOTE — MR AVS SNAPSHOT
[facility]  [address]  [place] [address]  Phone: [phone]                  Ayla Dela Cruz   4/3/2017 3:30 PM   Office Visit    Description:  Female : 1945   Provider:  Barrett Jurado MD   Department:  [place]           Reason for Visit     3  mo f/u           Diagnoses this Visit        Comments    Paroxysmal atrial fibrillation    -  Primary     Essential hypertension         Familial hypercholesterolemia         Chronic diastolic heart failure                To Do List           Future Appointments        Provider Department Dept Phone    2017 9:15 AM [facility], [place] [facility] [phone]    [facility]2017 [facility]:00 [phone]      Goals (5 Years of Data)     None      Follow-Up and Disposition     Return in about 6 months (around 10/3/2017).    Follow-up and Disposition History       These Medications        Disp Refills Start End    flecainide (TAMBOCOR) 150 MG Tab 180 tablet 3 4/3/2017     Take 1 tablet (150 mg total) by mouth every 12 (twelve) hours. - Oral    Pharmacy: [facility]/pharmacy #[id number] - [place] - [address]  #: [phone]         [facility] On [facility]     [facility] On Call Nurse Care Line -  Assistance  Unless otherwise directed by your provider, please contact [facility], our nurse care line that is available for / assistance.     Registered nurses in the [facility] On Call Center provide: appointment scheduling, clinical advisement, health education, and other advisory services.  Call: [phone] (toll free)               Medications           Message regarding Medications     Verify the changes and/or additions to your medication regime listed below are the same as discussed with your clinician today.  If any of these changes or additions are incorrect, please notify your healthcare provider.        CHANGE how you are taking these medications      "Start Taking Instead of    flecainide (TAMBOCOR) 150 MG Tab flecainide (TAMBOCOR) 100 MG Tab    Dosage:  Take 1 tablet (150 mg total) by mouth every 12 (twelve) hours. Dosage:  TAKE 1 TABLET (100 MG TOTAL) BY MOUTH EVERY 12 (TWELVE) HOURS.    Reason for Change:  Reorder            Verify that the below list of medications is an accurate representation of the medications you are currently taking.  If none reported, the list may be blank. If incorrect, please contact your healthcare provider. Carry this list with you in case of emergency.           Current Medications     ammonium lactate (LAC-HYDRIN) 12 % lotion     clobetasol (TEMOVATE) 0.05 % cream APPLY TWICE DAILY TO RASH UP TO 2 WEEKS/MONTH AS NEEDED.    dorzolamide-timolol (COSOPT) 2-0.5 % ophthalmic solution Place 1 drop into both eyes 2 (two) times daily. Twice a day    esomeprazole (NEXIUM) 40 MG capsule TAKE 1 CAPSULE (40 MG TOTAL) BY MOUTH ONCE DAILY. EVERY DAY    flecainide (TAMBOCOR) 150 MG Tab Take 1 tablet (150 mg total) by mouth every 12 (twelve) hours.    lorazepam (ATIVAN) 1 MG tablet Take 1 tablet (1 mg total) by mouth 3 (three) times daily as needed for Anxiety. anxiety    losartan (COZAAR) 100 MG tablet TAKE 1 TABLET (100 MG TOTAL) BY MOUTH ONCE DAILY.    polyethylene glycol (GLYCOLAX) 17 gram PwPk Take 17 g by mouth as needed.    rosuvastatin (CRESTOR) 40 MG Tab Take 1 tablet (40 mg total) by mouth every evening.    salsalate (DISALCID) 500 MG Tab TAKE 2 TABLETS BY MOUTH 3 TIMES A DAY AFTER MEALS AS NEEDED    VIIBRYD 40 mg Tab tablet Take 40 mg by mouth once daily.    XARELTO 20 mg Tab TAKE 1 TABLET (20 MG TOTAL) BY MOUTH DAILY WITH DINNER OR EVENING MEAL.           Clinical Reference Information           Your Vitals Were     BP Pulse Height Weight SpO2 BMI    145/65 (BP Location: Left arm) 88 5' 7" (1.702 m) 61.6 kg (135 lb 12.9 oz) 98% 21.27 kg/m2      Blood Pressure          Most Recent Value    Right Arm BP - Sitting  147/70    Left Arm BP " - Sitting  145/65    BP  (!)  145/65      Allergies as of 4/3/2017     Atorvastatin    Augmentin [Amoxicillin-pot Clavulanate]    Baclofen (Bulk)    Ciprofloxacin (Bulk)    Decongest Tabs    Erythromycin    Fluoxetine    Lisinopril    Morphine    Venlafaxine Analogues    Afrin [Oxymetazoline]    Caffeine      Immunizations Administered on Date of Encounter - 4/3/2017     None      Orders Placed During Today's Visit     Future Labs/Procedures Expected by Expires    Holter monitor - 48 hour  4/17/2017 4/3/2018      Language Assistance Services     ATTENTION: Language assistance services are available, free of charge. Please call [phone].      ATENCIÓN: Si habla hector, tiene a nettles disposición servicios gratuitos de asistencia lingüística. Llame al [phone].     CHÚ Ý: N?u b?n nói Ti?ng Vi?t, có các d?ch v? h? tr? ngôn ng? mi?n phí dành cho b?n. G?i s? [phone].         [facility] complies with applicable Federal civil rights laws and does not discriminate on the basis of race, color, national origin, age, disability, or sex.

## 2017-04-03 NOTE — PROGRESS NOTES
Subjective:    Patient ID:  Ayla Dela Cruz is a 72 y.o. female who presents for evaluation of 3  mo f/u  For HTN on Wellbutrin, hypercholesterolemia, palpitations, PAF on Flecainide   PCP: Dr. Olivo, see annually, do labs  Surgeon: Dr. Gonsalez, and Dr. Dc  Rheumatologist: Dr. Mellisa Pace cardiologist: Dr. Gibson, last seen over a year  Pulmonary: Dr. White  Psychologist: Nu Fermin, AGUILAR, in [place]  Gyn: Dr. Jordan  GI: Dr. Schultz  Neurologist: Dr. Ramirez  Dermatologist: Dennis Corrigan  Lives with , Jan, here with patient, non-smoker, history of prostate CA  daughter, Lyric, source of stress  Restarted , now 2-4 times weekly, still enjoys it    HPI  Hospitalized 6/3/2014 for acute right sided weakness and slurred speech  DCS - Ayla Dela Cruz is a 69 y.o. female admitted to the services of hospital medicine via the ER for acute CVA. Presented with difficulty speaking, facial drooping and weakness of the right upper and lower extremities. She missed the time window for tPA. ST/PT/OT as well as cardiology and neurology were consulted. Dr. Jurado of cardiology managed A fib. Patient rapidly improved functioning with PT/OT/ST. Currently her goals with PT are met as she is ambulating over 400 feet independently.  She was not approved for IP rehab and refuses SNF. She will be discharged home with outpatient PT/ST/OT evaluation and treatment.    Neurocardio work up  CT head - Mild involutional changes and findings suggesting mild microvascular ischemic change with no acute intracranial findings    Carotid US - No hemodynamically significant stenosis is noted by velocity criteria involving either internal carotid artery.  A hemodynamically significant stenosis is defined as a stenosis of greater than 50% of the internal carotid artery vessel lumen    ECHO, 6/4/2014, CONCLUSIONS     1 - Concentric hypertrophy. Wall thickness is mildly increased, with the septum and the  posterior wall each measuring 1.1 cm across.    2 - Normal left ventricular systolic function (EF 60-65%).     3 - Mild mitral regurgitation.     4 - Left ventricular diastolic dysfunction.     5 - Pulmonary hypertension. estimated PA systolic pressure is 64 mmHg.    6 - Normal right ventricular systolic function .     7 - Suggestion for increase in LV mass from echo on 3/18/2014..     Echo bubble study also negative. Had episode of PAF during monitoring and convert with restart of Flecainide.   Since DC, improving strength in the right hand, right leg feels normal but tire easier. Speech improving. To receive PT/OT/speech today.  Medications reviewed - will DC ASA for now, and know the effects of the Limbitrol and Ativan, uses prn rarely. Some loss of appetite and taste.    Active problems in hospital  Acute left hemispheric CVA, MRI suggest multiple areas, was not on OAC nor antiplatelet Rx  PAF with high CHADS-VAS score, post ablations and DCCs  HTN with LVH  Interstitial lung disease  Pulmonary hypertension  Hyperlipidemia was not on statin    Since visit of 6/20, problem with peripheral swelling, now taking Lasix daily. Last hospitalization / CMP, 6/3 showed K+ 3.4 with normal renal function. Also noted AVILA along with exertional chest heaviness, on-and-off over past several years. Noted it with walking and have to rest. Can last up to an hour. Max grade 10/10, yesterday during the day.  in hospital. Also quite anxious, stopped Limbitrol due to side effects, managed by Dr. Olivo. Quite sedentary and major decrease in appetite, due to depression. No Psychiatrist. Home BP high 175/97. ECG shows NSR, rate 61, right axis, nonspecific T waves abnormalities, prolong QTc 483 msec. Low anterior forces.    Holter, 6/30/2014:  PREDOMINANT RHYTHM  1. Sinus rhythm with heart rates varying between 43 and 67 bpm with an average of 55 bpm.     VENTRICULAR ARRHYTHMIAS  1. There were frequent polymorphic PVCs totalling  "1388 and averaging 40 per hour.  There was 1 couplet.    2. There were no episodes of ventricular tachycardia.    SUPRA VENTRICULAR ARRHYTHMIAS  1. There were very rare PACs totalling 47 and averaging 1 per hour.     2. There were no episodes of sustained supraventricular tachycardia.    SINUS NODE FUNCTION  1. There was no evidence of high grade SA khai block.     AV CONDUCTION  1. There was no evidence of high grade AV block.     DIARY  1. The diary was returned, but not completed    MISCELLANEOUS  1. This was a tape of adequate length (34 hrs).     Since visit of 7/24, better, reviewed by Dr. Olivo and started antidepressant Lexapro. BMP still showed mild hypokalemia of 3.3, not using Lasix at this time. Still feeling fatigue, anxiety, and request refill for Nexium. Discussed need for GI review on chronic PPI. Lack of appetite.  Lexiscan, 7/30/2014, - Nuclear Quantitative Functional Analysis:   LVEF: 66 % (normal is 55 - 69)  LVED Volume: 50 ml (normal is 60 - 98)  LVES Volume: 17 ml (normal is 20 - 42)    Impression: NORMAL MYOCARDIAL PERFUSION  1. The perfusion scan is free of evidence for myocardial ischemia or injury.   2. There is a mild intensity fixed defect in the anteroseptal wall of the left ventricle, secondary to breast attenuation.   3. Resting wall motion is physiologic.   4. Resting LV function is normal.  (normal is 55 - 69)  5. The ventricular volumes are normal at rest and stress.   6. The extracardiac distribution of radioactivity is normal.     No problem with spironolactone, BP improved, home BP similar to office readings.    Since visit of 8/14, had some distress and home monitor showed HR of 40, felt "bad" and nervous to ED, note reviewed, ECG and final pulse count 57-58. Labs reviewed - normal renal function and K+ 3.8. Still taking one tab of K+ daily. Not using Lasix. Explained HR discrepancy may be due to VPCs. Reviewed by Ms. Fermin and Lexapro increased to 20 mg, 2 days ago. Feeling " ""a lot better". Sleeping better. Still sedentary but ready to be more active. Eating better have lost additional 5 lbs since 8/2014.    Since visit of 9/5, still with speech therapy, noted progressive near syncope with SOB and irregular heart beats. Also noted some ankle swelling over the past 2 weeks. ECG shows marked bradycardia, rate 48, right axis, pulmonary pattern, 1st degree AVB. Wellbutrin 100 mg daily along with Lexapro 20 mg by Ms. Fermin NP. BMP showed K+3.5. To use Lasix PRN    Since visit of 9/25, still with marked AVILA, only able to walk about 30' before having to stop. Bothered by increase palpitations during tapering of BB. No definite lung problem, evaluated this year. ECG shows sinus dewayne, rate 53, VPC, RBBB with suggestion of septal MI. No evidence for anemia - CBC 9/3/2014 was normal. Just started doing some activities with arm and leg exercise for the last 2 weeks, Doing some OT alternating with PT every other day.  CXR, 6/3/2014 - Lungs are clear of infiltrate and costophrenic sulci are sharp.  The cardiomediastinal silhouette appears stable.    PET scan, 4/2014 - 1.A hypermetabolic left pulmonary mass is noted with an SUV of 3.0 maximally.  A neoplastic, infectious, or granulomatous process is on the differential. Clinical correlation is suggested.  Close follow-up for resolution or biopsy would be suggested    2.  Elevated right-sided heart pressures are suspected with a large right atrium    3.  Right-sided pleural effusion    4.  Hypermetabolic thyroid gland asymmetrically on the right.  This may relate to thyroiditis, Graves' disease, or neoplastic process.  Ultrasound may be helpful.    5.  Small hypermetabolic focus in the expected location of the left ovary, a female pelvis ultrasound may be helpful for further evaluation.  This could relate to a uterine fibroid that has necrosed or infarcted    Biopsy of lung nodule on 5/1/2014 - negative for CA    CTA, 4/2014 - No evidence of " "pulmonary thromboembolism.    2.  Enlarged cardiac silhouette and secondary findings of elevated right heart pressures with diffuse mosaic lung pattern and right basilar pleural fluid suggesting cardiac decompensation/heart failure.    3. Unchanged 1 cm nodular density within the left upper lobe which previously demonstrated hypermetabolism by PET/CT and unchanged mediastinal lymphadenopathy.    4.  Subpleural nodular density within the right upper lobe is unchanged when compared with the previous study and could represent an area of remote fibrotic scarring.    5.  Heterogeneous appearance of the spleen, possibly due to the phase of contrast enhancement.  Evolving splenic infarction is not entirely excluded.    6. Suggestion of colonic wall thickening involving the descending colon.  Please correlate for symptoms of colitis.    Since visit of 10/14, rehospitalized for HF on 10/26 to 10/29/2014.  DCS - "Ayla Dela Cruz is a 69 y.o. female admitted to the services of hospital medicine via the ER for acute diastolic heart failure. C/o severe SOB and increase in leg swelling over the past two days. Under the care of Dr. Jurado. Recently was taken off metoprolol. History of paroxsymal atrial fib. Hospitalized here in June in this year for acute CVA. It was at that time, pt started Xarelto. Deficits has resolved. No history of heart failure.     Hospital Course: The patient was admitted with acute CHF biventricular and mild elevation of her troponin's at 0.029 - 0.035 and therefore an anginal equivalent was suspected. The patient also had significant hypertension upon admission and although she was not in atrial fibrillation, her EKG showed a significant first degree AV block and RBBB. Discussion was made as to whether or not to decrease the patient flecainide; however due to the risk of her going back into atrial fibrillation this was not done. Upon discharge the patient's lisinopril was increased to 10 mg and aldactone " "was added."    RHC done HEMODYNAMIC RESULTS:    LVEDP (Pre): 24 mmHg  BP: 166/104    Air rest:  PA: 90/34 (54)  PW:  (24)  RVEDP: 16  RA:  (16)    C. SUMMARY:    1. Diastolic dysfunction.  2. - Kee CO 2.64 L/min  3. - Mean systemic pressure 108.6 mmHG, stage II HTN  4. - SVR 41.16 Wood Units  5. - PVR 11.36 Wood Units  6. - Pulmonary HTN due to left sided heart disease and stage II HTN    D. RECOMMENDATIONS:    1. Routine post-cath care.  2. Cardiac rehab referral.  3. Follow up with Dr. Barrett Jurado.  4. - Aggressive BP lowering and diuresis    Repeat ECHO 11/5/2014 CONCLUSIONS     1 - Concentric remodeling. Septal wall thickness is increased, and posterior wall thickness is mildly increased, with the septum measuring 1.3 cm and the posterior wall measuring 1.1 cm across.    2 - Mildly to moderately depressed left ventricular systolic function (EF 40-45%).     3 - Left ventricular diastolic dysfunction. E/e'(lat) is 16    4 - Right ventricular enlargement with mildly depressed systolic function.     5 - Pulmonary hypertension. estimated PA systolic pressure is 56 mmHg.     6 - Intermediate central venous pressure.     7 - No evidence of intracardiac shunt.     8 - No significant change from Echo of 6/4/2014.    Active problems:  Progressive severe SOB, functional class IV  Diastolic dysfunction, elevated BNP and Troponin  Hypokalemia due to diuretics  Secondary Pulmonary HTN with peripheral edema  HTN  History of CVA 6/3/2014  PAF controlled with Flecanide  Marked bradycardia with BB  On OAC  Hematuria    At home receiving PT, OT and speech Rx twice a week, with  nurse once a week, no problem with medications. Feeling better, sleeping well. Home /73.    Since visit of 11/14, feeling "much better", concern of occasional HTN, home BP /66-99, HR . Lot more active, no problem. Not using walker, no CP, SOB, nor dizziness. Recent BMP - normal renal function and K+ 4.1.    Since visit of 12/19/2014, " "worry about HTN home readings similar to office up to . Morning reading is higher. No HA nor visual abnormalities. Xanax has helped but afraid to use too much. ECG shows sinus arrhythmia, rate of 65, late transition and LAFB. Also bothered with dry cough with the Lisinopril. And started to have return of arm pains that was attributed to atorvastatin, on Crestor. Discussed options.     Since visit of 1/16/2015, noted frequent nocturia, 8-10 times, taking losartan-HCT at night time. Have stopped using Lasix and spironolactone for past 2 months. More active without much problem. Labs normal renal function, K+ 4.2, BNP now 37, A1C 5.6%.    Since visit of 2/18/2015, improving, no further dizziness, some SOB when "stressed", usually helped with alprazolam, use only prn, about 3-6 times weekly. Compliant with medications. Been off Crestor for 6 weeks with concern of muscle problem. Home BP is fine, similar to office.     Since visit of 4/15, appetite returned, feeling a lot better. Active, walking twice a week for about half an hour. Also do calisthenic about twice a week, no problem. Seeing Dr. Zavaleta for generalized pruritis for past 3 months. Cardiac wise less AVILA. Sleeping well. Labs in 5/2015, normal CBC without eosinophilia, thyroid normal, ferritin level low normal at 21. Off Crestor for couple months due to fear of muscle pain but did not find much difference being off medication. Last Lipid in 11/2014 was good, on daily dose of Crestor. Home /79.    Since visit of 7/2/2015, feeling "great", very active without problem, no more AVILA nor dizziness with standing. Compliant with medications, don't miss. Using Tylenol 500 mg BID for arthritis. Notice easy bruising on Xarelto. Home /10.  Holter - PREDOMINANT RHYTHM  1. Sinus arrhythmia with heart rates varying between 46 and 110 bpm with an average of 73 bpm.     VENTRICULAR ARRHYTHMIAS  1. There were very rare PVCs recorded totalling 8 and averaging " less than 1 per hour.     2. There were no episodes of ventricular tachycardia.    SUPRA VENTRICULAR ARRHYTHMIAS  1. There were very frequent PACs totalling 3054 and averaging 132 per hour.  There were 29 bigeminal cycles.  There were 103 couplets.    2. 3% of complexes.    3. There were no episodes of sustained supraventricular tachycardia.    SINUS NODE FUNCTION  1. There was no evidence of high grade SA khai block.     AV CONDUCTION  1. There was no evidence of high grade AV block.     2. The longest RR interval was 1565 msec.     DIARY  1. The diary was properly completed.     2. There was 1 episode of skipping beats reported. The corresponding rhythm strips revealed the following:             During event 1 the rhythm was sinus rhythm at 75 bpm.     3. There was 1 episode of skipped beat reported. The corresponding rhythm strips revealed the following:             During event 1 the rhythm was sinus arrhythmia at 63 bpm.     MISCELLANEOUS  1. This was a tape of adequate length (23 hrs).     Since visit of 10/15, place on new antidepressant, Venlafaxine 75 mg daily, tried 2 and developed rapid HR to 125 bpm, feels more anxious, now switched to Citalopram. Also noted the daily dose of Lorazepam does not seem adequate. Orthostatic VS is positive with lying 142/73, , standing 120/62, . Been taking spironolactone 25 mg for last 3 days. Does feel thirsty. Labs reviewed A1C 5.8%, CBC, BMP were WNL. Lipid shows LDL 99, non-. Goal preferred is <70 and < 100. No problem with 10 mg of Crestor. Had vacation at [place] and [place], walked all over without problem.    Since visit of 1/18/2016, no new problem. 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 Labs with , non-, hsCRP at 2.56.     In 10/2016, had acute GB attack with rupture in 8/2016, then tried on Wellbutrin took only 1-2 tabs and BP up to 201/100 with head tightness. Subsequently back to normal, the last 2.5 days  "took Losartan bid. Discussed Rx options for HTN. Advised to be more active, regular exercise. Lab reviewed LDL in 8/2016 93.     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 Still with daily palpitations, last up to half hours. Have electronic watch, David Barroso, since 9/2016, suppose harmonize patient with the universe. Feeling better if on during the day. Lot of walking at school, no problem.   Holter in 10/2016 - PREDOMINANT RHYTHM  1. Sinus rhythm with heart rates varying between 52 and 144 bpm with an average of 75 bpm.     2. Paroxysmal atrial fibrillation    VENTRICULAR ARRHYTHMIAS  1. There were occasional mostly monomorphic PVCs totalling 596 and averaging 13 per hour.     2. There were no episodes of ventricular tachycardia.    SUPRA VENTRICULAR ARRHYTHMIAS  1. There were frequent PACs totalling 2982 and averaging 69 per hour.  There were 69 bigeminal cycles.  There were 55 couplets.    2. There were no episodes of sustained supraventricular tachycardia.    3. Paroxysmal atrial fibrillation was noted in the the study.     SINUS NODE FUNCTION  1. There was no evidence of high grade SA khai block.     AV CONDUCTION  1. There was no evidence of high grade AV khai filtering.     2. The longest RR interval was 1725 msec.     DIARY  1. The diary was returned, but not completed    MISCELLANEOUS  1. This was a tape of adequate length (43 hrs).     ECHO CONCLUSIONS     1 - Hyperdynamic left ventricular systolic function (EF 65-70%).     2 - Normal left ventricular diastolic function.     3 - Normal right ventricular systolic function .     4 - Pulmonary hypertension. The estimated PA systolic pressure is 64 mmHg.     5 - Less evidence for LVH from Echo in 11/2014.       ROS    Constitutional: negative for chills, fatigue, fevers, sweats, mild slurred speech, and no dysarthria, appetite improved, intolerance to heat, bruises easily on NOAC  Eyes: negative for icterus, redness and visual disturbance, have " "visual halo  Respiratory: negative for asthma, cough have resolved off ACE-I, no dyspnea on exertion, occasional SOB with HR 85 to 100 bpm, have lifelong snoring, [place] score 7, no hemoptysis, pleurisy/chest pain and wheezing  Cardiovascular: negative for chest pain, chest pressure/discomfort, no further orthostatic dizziness, still with palpitations, no paroxysmal nocturnal dyspnea and syncope  Gastrointestinal: negative for abdominal pain, nausea and vomiting, have GERD  Musculoskeletal:positive for arthralgias, and less right sided muscle weakness with improvement in leg strength, improved bilateral ankle pains - OA and no myalgias  Neurological: negative for coordination problems, dizziness, gait problems, headaches, paresthesia, have some tremors but able to draw and no vertigo  Psych: positive for depression, nervousness, anxiety, less stressed due to 's illness have improved    Objective:    Physical Exam    General appearance: alert, appears stated age, cooperative and no distress, decrease skin turgor.  Head: Normocephalic, without obvious abnormality, atraumatic  Eyes:  conjunctivae/corneas clear. PERRL, EOM's intact.    Neck: no adenopathy, no carotid bruit, no JVD, supple, symmetrical, trachea midline and thyroid not enlarged, symmetric, no tenderness/mass/nodules  Lungs:  clear to auscultation bilaterally  Chest wall: no tenderness  Heart: regular rate and irregular rhythm, S1, S2 normal, no murmur, click, rub or gallop    Abdomen: soft, non-tender; bowel sounds normal; no masses,  no organomegaly, waist 31" hip 42"  Extremities: extremities no further weakness of the right upper extremity, atraumatic, no cyanosis and have no edema   Pulses: 2+ and symmetric    Assessment:       1. Paroxysmal atrial fibrillation, ablations X 2 1995, 1997, CHADS-VAS score 5, HAS-BLED score 5     2. Essential hypertension    3. Familial hypercholesterolemia    4. Chronic diastolic heart failure, 2014       "   Plan:       Ayla was seen today for follow-up.    Diagnoses and associated orders for this visit:    Paroxysmal atrial fibrillation, ablations X 2 1995, 1997, CHADS-VAS score 5, HAS-BLED score 5   -     flecainide (TAMBOCOR) 150 MG Tab; Take 1 tablet (150 mg total) by mouth every 12 (twelve) hours.  Dispense: 180 tablet; Refill: 3  -     Holter monitor - 48 hour; Future; Expected date: 4/17/17    Essential hypertension    Familial hypercholesterolemia    Chronic diastolic heart failure, 2014    - CV status stable, continue current Rx except for increase in Flecainide , all medications reviewed, patient acknowledge good understanding.  - Phone review / [facility]  - Recommend at least biannual cardiovascular evaluation in view of her significant risk factors.    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 A  would be very helpful.    BMI 24.8 decreased to 20, up 21.0    AVILA (dyspnea on exertion) - resolved    Chronic pruritus, onset 3/2015 - resolved off Xanax and Salsalate    Depression    Anticoagulated    Stress at home - improved  - Highly recommend 30 minutes of exercise daily, can have Sunday off, with 2-3 sessions of muscle strengthening weekly. A  would be very helpful.  - Consider Yoga, Gilberto Chi, or meditation    Elevated brain natriuretic peptide (BNP) level, range 98 - 1045 - improved     Pulmonary hypertension, with right sided congestive heart failure, acute    Chest pain on exertion - resolved    Peripheral edema - imporved    Statin intolerance with Lipitor    Anxiety - imporved    Patient Active Problem List   Diagnosis    Paroxysmal atrial fibrillation, ablations X 2 1995, 1997, CHADS-VAS score 5, HAS-BLED score 5     Hypertension, 1985    Hyperlipidemia, baseline     GERD (gastroesophageal reflux disease)    Glaucoma  (increased eye pressure)    Anxiety    Fibroid uterus    Thickened endometrium    Abnormal CT scan, chest    Interstitial lung disease    H/O: CVA (cerebrovascular accident), June 2014    LVH (left ventricular hypertrophy) due to hypertensive disease    Cardiomegaly    BMI 24.8 decreased to 20, up 21.0    Anticoagulated    Depression    VPC (ventricular premature complex)    OA (osteoarthritis)    Chronic diastolic heart failure, 2014    Elevated brain natriuretic peptide (BNP) level, range 98 - 1045    Microalbuminuria    Hypoalbuminemia    TIA (transient ischemic attack), 11/3/2014    Statin intolerance    Chronic pruritus, onset 3/2015    Hypotension due to drugs    Sinus tachycardia, on Venlafaxine    Sepsis    Acute delirium    Cancer screening    Reflux esophagitis    Colon cancer screening     Total face-to-face time with the patient was 30 minutes and greater than 50% was spent in counseling and coordination of care. The above assessment and plan have been discussed at length. Labs and procedure over the last 6 months reviewed. Problem List updated. Asked to bring in all active medications / pills bottles with next visit.

## 2017-04-14 ENCOUNTER — TELEPHONE (OUTPATIENT)
Dept: CARDIOLOGY | Facility: CLINIC | Age: 72
End: 2017-04-14

## 2017-04-14 DIAGNOSIS — I48.0 PAROXYSMAL ATRIAL FIBRILLATION: Primary | ICD-10-CM

## 2017-04-14 RX ORDER — PROPAFENONE HYDROCHLORIDE 150 MG/1
150 TABLET, COATED ORAL EVERY 8 HOURS
Qty: 90 TABLET | Refills: 11 | Status: SHIPPED | OUTPATIENT
Start: 2017-04-14 | End: 2017-05-15

## 2017-04-20 ENCOUNTER — NURSE TRIAGE (OUTPATIENT)
Dept: ADMINISTRATIVE | Facility: CLINIC | Age: 72
End: 2017-04-20

## 2017-04-20 ENCOUNTER — TELEPHONE (OUTPATIENT)
Dept: CARDIOLOGY | Facility: CLINIC | Age: 72
End: 2017-04-20

## 2017-04-20 NOTE — TELEPHONE ENCOUNTER
Reason for Disposition   Caller has NON-URGENT medication question about med that PCP prescribed and triager unable to answer question    Protocols used: ST MEDICATION QUESTION CALL-A-  Pt reports that her MD just recently switched her A FIB meds and she is still having the severe itching.     Pt is wanting to speak with DR. Jurado- please contact her with further instructions, questions, or concerns

## 2017-04-26 ENCOUNTER — PATIENT MESSAGE (OUTPATIENT)
Dept: CARDIOLOGY | Facility: CLINIC | Age: 72
End: 2017-04-26

## 2017-05-15 ENCOUNTER — OFFICE VISIT (OUTPATIENT)
Dept: CARDIOLOGY | Facility: CLINIC | Age: 72
End: 2017-05-15
Payer: MEDICARE

## 2017-05-15 VITALS
BODY MASS INDEX: 21.38 KG/M2 | DIASTOLIC BLOOD PRESSURE: 66 MMHG | SYSTOLIC BLOOD PRESSURE: 144 MMHG | OXYGEN SATURATION: 99 % | WEIGHT: 136.25 LBS | RESPIRATION RATE: 16 BRPM | HEART RATE: 114 BPM | HEIGHT: 67 IN

## 2017-05-15 DIAGNOSIS — I48.91 ATRIAL FIBRILLATION, UNSPECIFIED TYPE: Primary | ICD-10-CM

## 2017-05-15 DIAGNOSIS — F41.9 ANXIETY: ICD-10-CM

## 2017-05-15 DIAGNOSIS — I48.0 PAROXYSMAL ATRIAL FIBRILLATION: Primary | ICD-10-CM

## 2017-05-15 DIAGNOSIS — R06.09 DOE (DYSPNEA ON EXERTION): ICD-10-CM

## 2017-05-15 DIAGNOSIS — I50.32 CHRONIC DIASTOLIC HEART FAILURE: ICD-10-CM

## 2017-05-15 DIAGNOSIS — I48.91 ATRIAL FIBRILLATION, UNSPECIFIED TYPE: ICD-10-CM

## 2017-05-15 DIAGNOSIS — I11.0 LVH (LEFT VENTRICULAR HYPERTROPHY) DUE TO HYPERTENSIVE DISEASE, WITH HEART FAILURE: ICD-10-CM

## 2017-05-15 PROCEDURE — 93010 ELECTROCARDIOGRAM REPORT: CPT | Mod: S$PBB,,, | Performed by: INTERNAL MEDICINE

## 2017-05-15 PROCEDURE — 99215 OFFICE O/P EST HI 40 MIN: CPT | Mod: S$PBB,,, | Performed by: INTERNAL MEDICINE

## 2017-05-15 PROCEDURE — 99213 OFFICE O/P EST LOW 20 MIN: CPT | Mod: PBBFAC,PO | Performed by: INTERNAL MEDICINE

## 2017-05-15 PROCEDURE — 99999 PR PBB SHADOW E&M-EST. PATIENT-LVL III: CPT | Mod: PBBFAC,,, | Performed by: INTERNAL MEDICINE

## 2017-05-15 PROCEDURE — 93005 ELECTROCARDIOGRAM TRACING: CPT | Mod: PBBFAC,PO | Performed by: INTERNAL MEDICINE

## 2017-05-15 RX ORDER — LORAZEPAM 1 MG/1
TABLET ORAL
Qty: 30 TABLET | Refills: 3 | Status: SHIPPED | OUTPATIENT
Start: 2017-05-15 | End: 2020-01-23 | Stop reason: CLARIF

## 2017-05-15 RX ORDER — METOPROLOL TARTRATE 50 MG/1
50 TABLET ORAL 2 TIMES DAILY
Qty: 60 TABLET | Refills: 11 | Status: SHIPPED | OUTPATIENT
Start: 2017-05-15 | End: 2017-05-29

## 2017-05-15 RX ORDER — FLECAINIDE ACETATE 100 MG/1
TABLET ORAL
COMMUNITY
End: 2017-07-12

## 2017-05-15 NOTE — MR AVS SNAPSHOT
[facility]  [address]  [place] [address]  Phone: [phone]                  Ayla Dela Cruz   5/15/2017 4:00 PM   Office Visit    Description:  Female : 1945   Provider:  Barrett Jurado MD   Department:  [place] [facility] [place] - Cardiology           Reason for Visit     Atrial Fibrillation           Diagnoses this Visit        Comments    Paroxysmal atrial fibrillation    -  Primary     Atrial fibrillation, unspecified type         AVILA (dyspnea on exertion)         Chronic diastolic heart failure         LVH (left ventricular hypertrophy) due to hypertensive disease, with heart failure                To Do List           Future Appointments        Provider Department Dept Phone    5/15/2017 4:00 PM [facility] [phone]    2017 9:20 AM [facility] [phone]    11/15/2017 1:40 PM Barrett Jurado MD [facility] Card[facility] [phone]      Goals (5 Years of Data)     None      Follow-Up and Disposition     Return in about 6 months (around 11/15/2017).    Follow-up and Disposition History       These Medications        Disp Refills Start End    metoprolol tartrate (LOPRESSOR) 50 MG tablet 60 tablet 11 5/15/2017 5/15/2018    Take 1 tablet (50 mg total) by mouth 2 (two) times daily. - Oral    Pharmacy: [facility]/pharmacy #[id number] - [place] - [address] [facility] [facility]. Ph #: [phone]         [facility] On [facility]     [facility] On Call Nurse Care Line -  Assistance  Unless otherwise directed by your provider, please contact [facility], our nurse care line that is available for  assistance.     Registered nurses in the [facility] Center provide: appointment scheduling, clinical advisement, health education, and other advisory services.  Call: [phone] (toll free)               Medications           Message regarding Medications     Verify the changes and/or additions to your medication regime  listed below are the same as discussed with your clinician today.  If any of these changes or additions are incorrect, please notify your healthcare provider.        START taking these NEW medications        Refills    metoprolol tartrate (LOPRESSOR) 50 MG tablet 11    Sig: Take 1 tablet (50 mg total) by mouth 2 (two) times daily.    Class: Normal    Route: Oral      STOP taking these medications     propafenone (RHTHYMOL) 150 MG Tab Take 1 tablet (150 mg total) by mouth every 8 (eight) hours.           Verify that the below list of medications is an accurate representation of the medications you are currently taking.  If none reported, the list may be blank. If incorrect, please contact your healthcare provider. Carry this list with you in case of emergency.           Current Medications     ammonium lactate (LAC-HYDRIN) 12 % lotion     clobetasol (TEMOVATE) 0.05 % cream APPLY TWICE DAILY TO RASH UP TO 2 WEEKS/MONTH AS NEEDED.    dorzolamide-timolol (COSOPT) 2-0.5 % ophthalmic solution Place 1 drop into both eyes 2 (two) times daily. Twice a day    esomeprazole (NEXIUM) 40 MG capsule TAKE 1 CAPSULE (40 MG TOTAL) BY MOUTH ONCE DAILY. EVERY DAY    lorazepam (ATIVAN) 1 MG tablet Take 1 tablet (1 mg total) by mouth 3 (three) times daily as needed for Anxiety. anxiety    losartan (COZAAR) 100 MG tablet TAKE 1 TABLET (100 MG TOTAL) BY MOUTH ONCE DAILY.    polyethylene glycol (GLYCOLAX) 17 gram PwPk Take 17 g by mouth as needed.    rosuvastatin (CRESTOR) 40 MG Tab Take 1 tablet (40 mg total) by mouth every evening.    salsalate (DISALCID) 500 MG Tab TAKE 2 TABLETS BY MOUTH 3 TIMES A DAY AFTER MEALS AS NEEDED    VIIBRYD 40 mg Tab tablet Take 40 mg by mouth once daily.    XARELTO 20 mg Tab TAKE 1 TABLET (20 MG TOTAL) BY MOUTH DAILY WITH DINNER OR EVENING MEAL.    flecainide (TAMBOCOR) 100 MG Tab Take by mouth.    metoprolol tartrate (LOPRESSOR) 50 MG tablet Take 1 tablet (50 mg total) by mouth 2 (two) times daily.          "  Clinical Reference Information           Your Vitals Were     BP Pulse Resp Height Weight SpO2    144/66 114 16 5' 7" (1.702 m) 61.8 kg (136 lb 3.9 oz) 99%    BMI                21.34 kg/m2          Blood Pressure          Most Recent Value    Right Arm BP - Sitting  138/63    Left Arm BP - Sitting  144/66    BP  (!)  144/66      Allergies as of 5/15/2017     Atorvastatin    Augmentin [Amoxicillin-pot Clavulanate]    Baclofen (Bulk)    Ciprofloxacin (Bulk)    Decongest Tabs    Erythromycin    Flecainide    Fluoxetine    Lisinopril    Morphine    Venlafaxine Analogues    Afrin [Oxymetazoline]    Caffeine      Immunizations Administered on Date of Encounter - 5/15/2017     None      Orders Placed During Today's Visit      Normal Orders This Visit    Ambulatory consult to Electrophysiology     EKG 12-lead     Holter monitor - 48 hour       Instructions      Having Catheter Ablation  A heart rhythm problem (arrhythmia) can make your heart beat too fast or in an irregular pattern. The problem is often caused by cells in your heart that arent working as they should. Or, it may be the result of an abnormal electric circuit. It may cause bothersome symptoms, such as an irregular heartbeat, dizziness, shortness of breath, chest pain, or fainting. Your doctor has recommended catheter ablation to treat your arrhythmia. This non-surgical procedure destroys the cells that are causing the problem.  Before the procedure    Before your catheter ablation, you will meet with the cardiac electrophysiologist (specially trained heart doctor) who will do the procedure. He or she will tell you how to get ready. You will likely be told to stop or change your heart rhythm medicines for a period of time before the procedure. Follow your doctors instructions. Also:  · Tell the doctor about all prescription and over-the-counter medicines you take. This includes herbs, supplements, and vitamins. It also includes daily medicines, such as " insulin or blood thinners. If you are allergic to any medicines, tell the doctor.  · Have any routine tests, such as blood tests, as recommended.  · Dont eat or drink anything 12 hours before the procedure.  How catheter ablation is done  Catheter ablation uses thin, flexible wires (electrode catheters) to find and destroy (ablate) problem cells. Heres how the procedure is done:  · The hearts signals are mapped. To find the problem, an electrophysiology study (EPS) is done. During this study, the doctor tries to start (induce) your arrhythmia. An electrical map of the heart is then created. This shows the type of arrhythmia you have and where the problem is. Using the map as a guide, the doctor knows where to ablate.  · Problem areas are destroyed using heat or cold therapy. Once the EPS shows where the problem is, an electrode catheter is thread through a blood vessel to that area in the heart. Energy is sent through the catheter to destroy the problem cells.  · The hearts rhythm is tested again. After ablating the problem cells, the doctor tries to restart (reinduce) your arrhythmia. If a fast rhythm cant be induced, the ablation is a success. But if a fast rhythm does start again, further ablation may be needed.  Your experience during catheter ablation  In most cases, catheter ablation is done in an electrophysiology (EP) lab. It often takes 2 to 4  hours, and sometimes longer. Youll receive medicine to prevent pain. Medicine will also help you relax or sleep during the procedure. If you feel uncomfortable during the procedure, tell the doctor or nurse:  · Getting started. First, skin on your groin (or rarely, the neck) is washed. Any hair in that area may be removed. This is where the catheters will be inserted. An IV (intravenous) line is started in your arm. Medicines and fluids are provided through this IV. To help keep the insertion site germ-free (sterile), your body is draped with sheets. Only the  area where the catheters will be inserted is exposed.  · Inserting the catheters. The skin where the catheters will be inserted is numbed with a local anesthetic. This is so you wont feel pain. Then a small needle is used to make punctures in your vein or artery. Catheters are inserted through these punctures and guided to the heart with the help of X-ray monitors.  · Wires are then placed in various places in the heart to map the electrical signals as well as to stimulate the heart.  · Finishing up. When the procedure is finished, the catheters are taken out of your body. Pressure is applied to the puncture sites to stop any bleeding. No stitches are needed. Youre then taken to a recovery room to rest. You'll need to remain lying down for a few hours. You'll be asked not to move the leg where the catheters were inserted for a few hours.   Risks and complications  The risks of catheter ablation are fairly low compared to the benefits you receive. Discuss these risks with your doctor before the procedure. Possible risks and complications include:  · Bleeding or bruising at the catheter insertion site  · Blood clots  · A slow heart rhythm (requiring a permanent pacemaker)  · Perforation of the heart muscle, blood vessel, or lung (may require an emergency procedure)  · Damage to a heart valve (rare)  · Stroke or heart attack, also known as acute myocardial infarction, or AMI (rare)  · Infection, which is a risk after any invasive procedure. This may be indicated by a fever of 100.4°F (38.0°C) or higher, drainage, or redness and pain at the catheter insertion site.  · Death (extremely rare)   Date Last Reviewed: 5/1/2016  © 8490-4770 [employer]. [address] [place] [address], [place] [address]. All rights reserved. This information is not intended as a substitute for professional medical care. Always follow your healthcare professional's instructions.        Discharge Instructions for Atrial Fibrillation  You  have been diagnosed with an abnormal heart rhythm called atrial fibrillation. With this condition, your hearts 2 upper chambers quiver rather than squeeze the blood out in a normal pattern. This leads to an irregular and sometimes rapid heartbeat. Some people will develop associated symptoms such as a flip-flopping heartbeat, chest pain, lightheadedness, or shortness of breath. Other people may have no symptoms at all. Atrial fibrillation is serious because it affects the hearts ability to fill with blood as it should. Blood clots may form. This increases the risk for stroke. Untreated atrial fibrillation can also lead to heart failure. Atrial fibrillation can be controlled. With treatment, most people with atrial fibrillation lead normal lives.  Treatment options  Recommended treatment for atrial fibrillation depends on your age, symptoms, how long you have had atrial fibrillation, and other factors. You will have a complete evaluation to find out if you have any abnormalities that caused your heart to go into atrial fibrillation. This might be blocked heart arteries or a thyroid problem. Your doctor will assess your particular case and discuss choices with you.  Treatment choices may include:  · Treating an underlying disorder that puts you at risk for atrial fibrillation. For example, correcting an abnormal thyroid or electrolyte problem, or treating a blocked heart artery.  · Restoring a normal heart rhythm with an electrical shock (cardioversion) or with an antiarrhythmic medicine (chemical cardioversion)  · Using medicine to control your heart rate in atrial fibrillation.  · Preventing the risk for blood clot and stroke using blood-thinning medicines. Your doctor will tell you what he or she recommends. Choices may include aspirin, clopidogrel, warfarin, dabigatran, rivaroxaban, apixaban, and edoxaban.  · Doing catheter ablation or a surgical maze procedure. These use different methods to destroy certain  areas of heart tissue. This interrupts the electrical signals causing atrial fibrillation. One of these procedures may be a choice when medicines do not work, or as an alternative to long-term medicine.  · Other treatment choices may be recommended for you by your doctor.  Managing risk factors for stroke and preventing heart failure are important parts of any treatment plan for atrial fibrillation.  Home care  · Take your medicines exactly as directed. Dont skip doses.  · Work with your doctor to find the right medicines and doses for you.  · Learn to take your own pulse. Keep a record of your results. Ask your doctor which pulse rates mean that you need medical attention. Slowing your pulse is often the goal of treatment. Ask your doctor if its OK for you to use an automatic machine to check your pulse at home. Sometimes these machines dont count the pulse correctly when you have atrial fibrillation.  · Limit your intake of coffee, tea, cola, and other beverages with caffeine. Talk with your doctor about whether you should eliminate caffeine.  · Avoid over-the-counter medicines that have caffeine in them.  · Let your doctor know what medicines you take, including prescription and over-the-counter medicines, as well as any supplements. They interfere with some medicines given for atrial fibrillation.  · Ask your doctor about whether you can drink alcohol. Some people need to avoid alcohol to better treat atrial fibrillation. If you are taking blood-thinner medicines, alcohol may interfere with them by increasing their effect.  · Never take stimulants such as amphetamines or cocaine. These drugs can speed up your heart rate and trigger atrial fibrillation.  Follow-up care  Follow up with your doctor, or as advised.     When should I call my healthcare provider  Call your healthcare provider right away if you have any of the following:  · Weakness  · Dizziness  · Fainting  · Fatigue  · Shortness of breath  · Chest  pain with increased activity  · A change in the usual regularity of your heartbeat, or an unusually fast heartbeat   Date Last Reviewed: 4/23/2016 © 2000-2016 [employer]. [address] [place] [address], [place] [address]. All rights reserved. This information is not intended as a substitute for professional medical care. Always follow your healthcare professional's instructions.        Understanding Atrial Fibrillation    An arrhythmia is any problem with the speed or pattern of the heartbeat. Atrial fibrillation (AFib) is a common type of arrhythmia. It causes fast, chaotic electrical signals in the atria. This leads to poor functioning of the heart. It also affects how much blood your heart can pump out to the body.  Afib may occur once in a while and go away on its own. Or it may continue for longer periods and need treatment.  AFib can lead to serious problems, such as stroke. Your healthcare provider will need to monitor and manage it.  What happens during atrial fibrillation?   The heart has an electrical system that sends signals to control the heartbeat. As signals move through the heart, they tell the hearts upper chambers (atria) and lower chambers (ventricles) when to squeeze (contract) and relax. This lets blood move through the heart and out to the body and lungs.  With AFib, the atria receive abnormal signals. This causes them to contract in a fast and irregular way, and out of sync with the ventricles. When this happens, the atria also have a harder time moving blood into the ventricles. Blood may then pool in the atria, which increases the risk for blood clots and stroke. The ventricles also may contract too quickly and irregularly. As a result, they may not pump blood to the body and lungs as well as they should. This can weaken the heart muscle over time and cause heart failure.  What causes atrial fibrillation?  AFib is more common in older adults. It has many possible causes  including:  · Coronary artery disease  · Heart valve disease  · Heart attack  · Heart surgery  · High blood pressure  · Thyroid disease  · Diabetes  · Lung disease  · Sleep apnea  · Heavy alcohol use  In some cases of AFib, doctors do not know the cause.  What are the symptoms of atrial fibrillation?  AFib may or may not cause symptoms. If symptoms do occur, they may include:  · A fast, pounding, irregular heartbeat  · Shortness of breath  · Tiredness  · Dizziness or fainting  · Chest pain  How is atrial fibrillation treated?  Treatments for AFib can include any of the options below.  · Medicines. You may be prescribed:  ¨ Heart rate medicines to help slow down the heartbeat  ¨ Heart rhythm medicines to help the heart beat more regularly  ¨ Anti-clotting medicines to help reduce the risk for blood clots and stroke  · Electrical cardioversion. Your healthcare provider uses special pads or paddles to send one or more brief electrical shocks to the heart. This can help reset the heartbeat to normal.  · Ablation. Long, thin tubes called catheters are threaded through a blood vessel to the heart. There, the catheters send out hot or cold energy to the areas causing the abnormal signals. This energy destroys the problem tissue or cells. This improves the chances that your heart will stay in normal rhythm without using medicines. If your heart rate and rhythm cant be controlled, you may need ablation and a pacemaker. These will help control the heart rate and regularity of the heartbeat.  · Surgery. During surgery, your healthcare provider may use different methods to create scar tissue in the areas of the heart causing the abnormal signals. The scar tissue disrupts the abnormal signals and may stop AFib from occurring.  What are the complications of atrial fibrillation?  These can include:  · Blood clots  · Stroke  · Heart failure. This problem occurs when the heart muscle weakens so much that it can no longer pump blood  well.  When should I call my healthcare provider?  Call your healthcare provider right away if you have any of these:  · Symptoms that dont get better with treatment, or get worse  · New symptoms   Date Last Reviewed: 5/1/2016 © 2000-2016 [employer]. [address] [place] [address]. All rights reserved. This information is not intended as a substitute for professional medical care. Always follow your healthcare professional's instructions.        What Is Atrial Flutter/Atrial Fibrillation?      The heart has its own electrical system. This system makes the signals that start each heartbeat. The heartbeat begins in 1 of the 2 upper chambers of the heart (atria). A problem can make the atria beat faster than normal. The atria may beat fast but still evenly. This problem is called atrial flutter. If the atria beat very fast and also unevenly, it is called atrial fibrillation (AFib).  Causes of Atrial Flutter and Atrial Fibrillation  Causes of these problems can include:  · Previous heart attack  · High blood pressure  · Thyroid problems  In many cases, the cause is unknown.  When the Atria Beat Too Fast  The atria may beat fast only once in a while. This is called a paroxysmal heart rhythm problem. If they beat fast all the time, it is a chronic problem.  Atrial Flutter  With atrial flutter, electrical signals travel around and around inside the atria. These circling signals make the atria beat too fast:  · Atrial flutter can cause symptoms similar to AFib. It can also lead to the even faster, uneven rhythms of AFib.  Atrial Fibrillation (AFib)  With AFib, cells in the atria send extra electrical signals. These extra signals make the atria beat very fast. They also beat unevenly:  · The atria beat so fast and unevenly that they may quiver instead of yoan. If the atria dont contract, they dont move enough blood into the 2 lower chambers of the heart (ventricles). This can cause you to  feel dizzy or weak.  · Blood that doesnt keep moving can pool and form clots in the atria. These clots can move into other parts of the body and cause serious problems such as a stroke.   Symptoms of Atrial Flutter and AFib  These symptoms include the following:  · Palpitations (a fluttering, fast heartbeat)  · Weakness or tiredness  · Shortness of breath  · Chest pain or tightness  · Dizziness or lightheadedness  · Fainting spells   Date Last Reviewed: 2/26/2014  © 0009-6010 [employer]. [address] [place] [address]. All rights reserved. This information is not intended as a substitute for professional medical care. Always follow your healthcare professional's instructions.             Language Assistance Services     ATTENTION: Language assistance services are available, free of charge. Please call [phone].      ATENCIÓN: Si prabha young, tiene a nettles disposición servicios gratuitos de asistencia lingüística. Llame al [phone].     CHÚ Ý: N?u b?n nói Ti?ng Vi?t, có các d?ch v? h? tr? ngôn ng? mi?n phí dành cho b?n. G?i s? [phone].         [facility] complies with applicable Federal civil rights laws and does not discriminate on the basis of race, color, national origin, age, disability, or sex.

## 2017-05-15 NOTE — PROGRESS NOTES
Subjective:    Patient ID:  Ayla Dela Cruz is a 72 y.o. female who presents for evaluation of Atrial Fibrillation (med change)  For PAF on propafenone 150 mg tid, AVILA, recurrent AF, LVH, chronic diastolic HF   PCP: Dr. Olivo, see annually, do labs  Surgeon: Dr. Gonsalez, and Dr. Dc  Rheumatologist: Dr. Mellisa Pace cardiologist: Dr. Gibson, last seen over a year  Pulmonary: Dr. White  Psychologist: Nu Fermin NP, in [place]  Gyn: Dr. Jordan  GI: Dr. Schultz  Neurologist: Dr. Ramirez  Dermatologist: Dr. Zavaleta [place]  Lives with , Jan, here with patient, non-smoker, history of prostate CA,  50 years on 6/24  daughter, Lyric, source of stress  Restarted , now 2-4 times weekly, still enjoys it    HPI  Hospitalized 6/3/2014 for acute right sided weakness and slurred speech  DCS - Ayla Dela Cruz is a 69 y.o. female admitted to the services of hospital medicine via the ER for acute CVA. Presented with difficulty speaking, facial drooping and weakness of the right upper and lower extremities. She missed the time window for tPA. ST/PT/OT as well as cardiology and neurology were consulted. Dr. Jurado of cardiology managed A fib. Patient rapidly improved functioning with PT/OT/ST. Currently her goals with PT are met as she is ambulating over 400 feet independently.  She was not approved for IP rehab and refuses SNF. She will be discharged home with outpatient PT/ST/OT evaluation and treatment.    Neurocardio work up  CT head - Mild involutional changes and findings suggesting mild microvascular ischemic change with no acute intracranial findings    Carotid US - No hemodynamically significant stenosis is noted by velocity criteria involving either internal carotid artery.  A hemodynamically significant stenosis is defined as a stenosis of greater than 50% of the internal carotid artery vessel lumen    ECHO, 6/4/2014, CONCLUSIONS     1 - Concentric hypertrophy. Wall thickness  is mildly increased, with the septum and the posterior wall each measuring 1.1 cm across.    2 - Normal left ventricular systolic function (EF 60-65%).     3 - Mild mitral regurgitation.     4 - Left ventricular diastolic dysfunction.     5 - Pulmonary hypertension. estimated PA systolic pressure is 64 mmHg.    6 - Normal right ventricular systolic function .     7 - Suggestion for increase in LV mass from echo on 3/18/2014..     Echo bubble study also negative. Had episode of PAF during monitoring and convert with restart of Flecainide.   Since DC, improving strength in the right hand, right leg feels normal but tire easier. Speech improving. To receive PT/OT/speech today.  Medications reviewed - will DC ASA for now, and know the effects of the Limbitrol and Ativan, uses prn rarely. Some loss of appetite and taste.    Active problems in hospital  Acute left hemispheric CVA, MRI suggest multiple areas, was not on OAC nor antiplatelet Rx  PAF with high CHADS-VAS score, post ablations and DCCs  HTN with LVH  Interstitial lung disease  Pulmonary hypertension  Hyperlipidemia was not on statin    Since visit of 6/20, problem with peripheral swelling, now taking Lasix daily. Last hospitalization / CMP, 6/3 showed K+ 3.4 with normal renal function. Also noted AVILA along with exertional chest heaviness, on-and-off over past several years. Noted it with walking and have to rest. Can last up to an hour. Max grade 10/10, yesterday during the day.  in hospital. Also quite anxious, stopped Limbitrol due to side effects, managed by Dr. Olivo. Quite sedentary and major decrease in appetite, due to depression. No Psychiatrist. Home BP high 175/97. ECG shows NSR, rate 61, right axis, nonspecific T waves abnormalities, prolong QTc 483 msec. Low anterior forces.    Holter, 6/30/2014:  PREDOMINANT RHYTHM  1. Sinus rhythm with heart rates varying between 43 and 67 bpm with an average of 55 bpm.     VENTRICULAR ARRHYTHMIAS  1.  "There were frequent polymorphic PVCs totalling 1388 and averaging 40 per hour.  There was 1 couplet.    2. There were no episodes of ventricular tachycardia.    SUPRA VENTRICULAR ARRHYTHMIAS  1. There were very rare PACs totalling 47 and averaging 1 per hour.     2. There were no episodes of sustained supraventricular tachycardia.    SINUS NODE FUNCTION  1. There was no evidence of high grade SA khai block.     AV CONDUCTION  1. There was no evidence of high grade AV block.     DIARY  1. The diary was returned, but not completed    MISCELLANEOUS  1. This was a tape of adequate length (34 hrs).     Since visit of 7/24, better, reviewed by Dr. Olivo and started antidepressant Lexapro. BMP still showed mild hypokalemia of 3.3, not using Lasix at this time. Still feeling fatigue, anxiety, and request refill for Nexium. Discussed need for GI review on chronic PPI. Lack of appetite.  Lexiscan, 7/30/2014, - Nuclear Quantitative Functional Analysis:   LVEF: 66 % (normal is 55 - 69)  LVED Volume: 50 ml (normal is 60 - 98)  LVES Volume: 17 ml (normal is 20 - 42)    Impression: NORMAL MYOCARDIAL PERFUSION  1. The perfusion scan is free of evidence for myocardial ischemia or injury.   2. There is a mild intensity fixed defect in the anteroseptal wall of the left ventricle, secondary to breast attenuation.   3. Resting wall motion is physiologic.   4. Resting LV function is normal.  (normal is 55 - 69)  5. The ventricular volumes are normal at rest and stress.   6. The extracardiac distribution of radioactivity is normal.     No problem with spironolactone, BP improved, home BP similar to office readings.    Since visit of 8/14, had some distress and home monitor showed HR of 40, felt "bad" and nervous to ED, note reviewed, ECG and final pulse count 57-58. Labs reviewed - normal renal function and K+ 3.8. Still taking one tab of K+ daily. Not using Lasix. Explained HR discrepancy may be due to VPCs. Reviewed by Ms. Fermin and " "Lexapro increased to 20 mg, 2 days ago. Feeling "a lot better". Sleeping better. Still sedentary but ready to be more active. Eating better have lost additional 5 lbs since 8/2014.    Since visit of 9/5, still with speech therapy, noted progressive near syncope with SOB and irregular heart beats. Also noted some ankle swelling over the past 2 weeks. ECG shows marked bradycardia, rate 48, right axis, pulmonary pattern, 1st degree AVB. Wellbutrin 100 mg daily along with Lexapro 20 mg by Ms. Jeny SHEIKH. BMP showed K+3.5. To use Lasix PRN    Since visit of 9/25, still with marked AVILA, only able to walk about 30' before having to stop. Bothered by increase palpitations during tapering of BB. No definite lung problem, evaluated this year. ECG shows sinus dewayne, rate 53, VPC, RBBB with suggestion of septal MI. No evidence for anemia - CBC 9/3/2014 was normal. Just started doing some activities with arm and leg exercise for the last 2 weeks, Doing some OT alternating with PT every other day.  CXR, 6/3/2014 - Lungs are clear of infiltrate and costophrenic sulci are sharp.  The cardiomediastinal silhouette appears stable.    PET scan, 4/2014 - 1.A hypermetabolic left pulmonary mass is noted with an SUV of 3.0 maximally.  A neoplastic, infectious, or granulomatous process is on the differential. Clinical correlation is suggested.  Close follow-up for resolution or biopsy would be suggested    2.  Elevated right-sided heart pressures are suspected with a large right atrium    3.  Right-sided pleural effusion    4.  Hypermetabolic thyroid gland asymmetrically on the right.  This may relate to thyroiditis, Graves' disease, or neoplastic process.  Ultrasound may be helpful.    5.  Small hypermetabolic focus in the expected location of the left ovary, a female pelvis ultrasound may be helpful for further evaluation.  This could relate to a uterine fibroid that has necrosed or infarcted    Biopsy of lung nodule on 5/1/2014 - negative " "for CA    CTA, 4/2014 - No evidence of pulmonary thromboembolism.    2.  Enlarged cardiac silhouette and secondary findings of elevated right heart pressures with diffuse mosaic lung pattern and right basilar pleural fluid suggesting cardiac decompensation/heart failure.    3. Unchanged 1 cm nodular density within the left upper lobe which previously demonstrated hypermetabolism by PET/CT and unchanged mediastinal lymphadenopathy.    4.  Subpleural nodular density within the right upper lobe is unchanged when compared with the previous study and could represent an area of remote fibrotic scarring.    5.  Heterogeneous appearance of the spleen, possibly due to the phase of contrast enhancement.  Evolving splenic infarction is not entirely excluded.    6. Suggestion of colonic wall thickening involving the descending colon.  Please correlate for symptoms of colitis.    Since visit of 10/14, rehospitalized for HF on 10/26 to 10/29/2014.  DCS - "Ayla Dela Cruz is a 69 y.o. female admitted to the services of hospital medicine via the ER for acute diastolic heart failure. C/o severe SOB and increase in leg swelling over the past two days. Under the care of Dr. Jurado. Recently was taken off metoprolol. History of paroxsymal atrial fib. Hospitalized here in June in this year for acute CVA. It was at that time, pt started Xarelto. Deficits has resolved. No history of heart failure.     Hospital Course: The patient was admitted with acute CHF biventricular and mild elevation of her troponin's at 0.029 - 0.035 and therefore an anginal equivalent was suspected. The patient also had significant hypertension upon admission and although she was not in atrial fibrillation, her EKG showed a significant first degree AV block and RBBB. Discussion was made as to whether or not to decrease the patient flecainide; however due to the risk of her going back into atrial fibrillation this was not done. Upon discharge the patient's " "lisinopril was increased to 10 mg and aldactone was added."    RHC done HEMODYNAMIC RESULTS:    LVEDP (Pre): 24 mmHg  BP: 166/104    Air rest:  PA: 90/34 (54)  PW:  (24)  RVEDP: 16  RA:  (16)    C. SUMMARY:    1. Diastolic dysfunction.  2. - Kee CO 2.64 L/min  3. - Mean systemic pressure 108.6 mmHG, stage II HTN  4. - SVR 41.16 Wood Units  5. - PVR 11.36 Wood Units  6. - Pulmonary HTN due to left sided heart disease and stage II HTN    D. RECOMMENDATIONS:    1. Routine post-cath care.  2. Cardiac rehab referral.  3. Follow up with Dr. Barrett Jurado.  4. - Aggressive BP lowering and diuresis    Repeat ECHO 11/5/2014 CONCLUSIONS     1 - Concentric remodeling. Septal wall thickness is increased, and posterior wall thickness is mildly increased, with the septum measuring 1.3 cm and the posterior wall measuring 1.1 cm across.    2 - Mildly to moderately depressed left ventricular systolic function (EF 40-45%).     3 - Left ventricular diastolic dysfunction. E/e'(lat) is 16    4 - Right ventricular enlargement with mildly depressed systolic function.     5 - Pulmonary hypertension. estimated PA systolic pressure is 56 mmHg.     6 - Intermediate central venous pressure.     7 - No evidence of intracardiac shunt.     8 - No significant change from Echo of 6/4/2014.    Active problems:  Progressive severe SOB, functional class IV  Diastolic dysfunction, elevated BNP and Troponin  Hypokalemia due to diuretics  Secondary Pulmonary HTN with peripheral edema  HTN  History of CVA 6/3/2014  PAF controlled with Flecanide  Marked bradycardia with BB  On OAC  Hematuria    At home receiving PT, OT and speech Rx twice a week, with  nurse once a week, no problem with medications. Feeling better, sleeping well. Home /73.    Since visit of 11/14, feeling "much better", concern of occasional HTN, home BP /66-99, HR . Lot more active, no problem. Not using walker, no CP, SOB, nor dizziness. Recent BMP - normal renal " "function and K+ 4.1.    Since visit of 12/19/2014, worry about HTN home readings similar to office up to . Morning reading is higher. No HA nor visual abnormalities. Xanax has helped but afraid to use too much. ECG shows sinus arrhythmia, rate of 65, late transition and LAFB. Also bothered with dry cough with the Lisinopril. And started to have return of arm pains that was attributed to atorvastatin, on Crestor. Discussed options.     Since visit of 1/16/2015, noted frequent nocturia, 8-10 times, taking losartan-HCT at night time. Have stopped using Lasix and spironolactone for past 2 months. More active without much problem. Labs normal renal function, K+ 4.2, BNP now 37, A1C 5.6%.    Since visit of 2/18/2015, improving, no further dizziness, some SOB when "stressed", usually helped with alprazolam, use only prn, about 3-6 times weekly. Compliant with medications. Been off Crestor for 6 weeks with concern of muscle problem. Home BP is fine, similar to office.     Since visit of 4/15, appetite returned, feeling a lot better. Active, walking twice a week for about half an hour. Also do calisthenic about twice a week, no problem. Seeing Dr. Zavaleta for generalized pruritis for past 3 months. Cardiac wise less AVILA. Sleeping well. Labs in 5/2015, normal CBC without eosinophilia, thyroid normal, ferritin level low normal at 21. Off Crestor for couple months due to fear of muscle pain but did not find much difference being off medication. Last Lipid in 11/2014 was good, on daily dose of Crestor. Home /79.    Since visit of 7/2/2015, feeling "great", very active without problem, no more AVILA nor dizziness with standing. Compliant with medications, don't miss. Using Tylenol 500 mg BID for arthritis. Notice easy bruising on Xarelto. Home /10.  Holter - PREDOMINANT RHYTHM  1. Sinus arrhythmia with heart rates varying between 46 and 110 bpm with an average of 73 bpm.     VENTRICULAR ARRHYTHMIAS  1. There were " very rare PVCs recorded totalling 8 and averaging less than 1 per hour.     2. There were no episodes of ventricular tachycardia.    SUPRA VENTRICULAR ARRHYTHMIAS  1. There were very frequent PACs totalling 3054 and averaging 132 per hour.  There were 29 bigeminal cycles.  There were 103 couplets.    2. 3% of complexes.    3. There were no episodes of sustained supraventricular tachycardia.    SINUS NODE FUNCTION  1. There was no evidence of high grade SA khai block.     AV CONDUCTION  1. There was no evidence of high grade AV block.     2. The longest RR interval was 1565 msec.     DIARY  1. The diary was properly completed.     2. There was 1 episode of skipping beats reported. The corresponding rhythm strips revealed the following:             During event 1 the rhythm was sinus rhythm at 75 bpm.     3. There was 1 episode of skipped beat reported. The corresponding rhythm strips revealed the following:             During event 1 the rhythm was sinus arrhythmia at 63 bpm.     MISCELLANEOUS  1. This was a tape of adequate length (23 hrs).     Since visit of 10/15, place on new antidepressant, Venlafaxine 75 mg daily, tried 2 and developed rapid HR to 125 bpm, feels more anxious, now switched to Citalopram. Also noted the daily dose of Lorazepam does not seem adequate. Orthostatic VS is positive with lying 142/73, , standing 120/62, . Been taking spironolactone 25 mg for last 3 days. Does feel thirsty. Labs reviewed A1C 5.8%, CBC, BMP were WNL. Lipid shows LDL 99, non-. Goal preferred is <70 and < 100. No problem with 10 mg of Crestor. Had vacation at [place], walked all over without problem.    Since visit of 1/18/2016, no new problem. 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 Labs with , non-, hsCRP at 2.56.     In 10/2016, had acute GB attack with rupture in 8/2016, then tried on Wellbutrin took only 1-2 tabs and BP up to 201/100 with head  "tightness. Subsequently back to normal, the last 2.5 days took Losartan bid. Discussed Rx options for HTN. Advised to be more active, regular exercise. Lab reviewed LDL in 8/2016 93.     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 Still with daily palpitations, last up to half hours. Have electronic watch, David Barroso, since 9/2016, suppose harmonize patient with the universe. Feeling better if on during the day. Lot of walking at school, no problem.   Holter in 10/2016 - PREDOMINANT RHYTHM  1. Sinus rhythm with heart rates varying between 52 and 144 bpm with an average of 75 bpm.     2. Paroxysmal atrial fibrillation    VENTRICULAR ARRHYTHMIAS  1. There were occasional mostly monomorphic PVCs totalling 596 and averaging 13 per hour.     2. There were no episodes of ventricular tachycardia.    SUPRA VENTRICULAR ARRHYTHMIAS  1. There were frequent PACs totalling 2982 and averaging 69 per hour.  There were 69 bigeminal cycles.  There were 55 couplets.    2. There were no episodes of sustained supraventricular tachycardia.    3. Paroxysmal atrial fibrillation was noted in the the study.     SINUS NODE FUNCTION  1. There was no evidence of high grade SA khai block.     AV CONDUCTION  1. There was no evidence of high grade AV khai filtering.     2. The longest RR interval was 1725 msec.     DIARY  1. The diary was returned, but not completed    MISCELLANEOUS  1. This was a tape of adequate length (43 hrs).     ECHO CONCLUSIONS     1 - Hyperdynamic left ventricular systolic function (EF 65-70%).     2 - Normal left ventricular diastolic function.     3 - Normal right ventricular systolic function .     4 - Pulmonary hypertension. The estimated PA systolic pressure is 64 mmHg.     5 - Less evidence for LVH from Echo in 11/2014.     In 5/2017, bothered by recurrent PAF with AVILA after 10 feet walking. Felt better before on Flecainide 100 mg bid, but Holter continued to show PAF. Attempt up titration, problem with " itching, switched to propafenone 150 mg tid, continued with itching and reviewed by allergist, treated with 10 days of cortisone with benefit. No hive and no itching, just don't feel good due to the irregular rhythm. History reviewed, had 2 prior AF ablation, last in [place], [place] in around 2000, recall being told not to do again. Recall seeing an [facility] EP doc but didn't like him. Discussed various options. Will stop antiarrhythmic for now, will be going on a 16 days trip to [place]. Reviewed prior medications, have taken Tenormin, metoprolol tartrate, and short-acting diltiazem in the past without problem. Refer to Dr. Calderon for review. ECG in AF, rate 99, PRWP, LAFB    ROS    Constitutional: negative for chills, fatigue, fevers, sweats, no further slurred speech, and no dysarthria, appetite improved, intolerance to heat, bruises easily on NOAC  Eyes: negative for icterus, redness and visual disturbance, have visual halo  Respiratory: negative for asthma, cough have resolved off ACE-I, have dyspnea on exertion, occasional SOB with HR 85 to 100 bpm, have lifelong snoring, Pinon score 7 improved down to 3, no hemoptysis, pleurisy/chest pain and wheezing  Cardiovascular: negative for chest pain, chest pressure/discomfort, no further orthostatic dizziness, still with palpitations, no paroxysmal nocturnal dyspnea and syncope  Gastrointestinal: negative for abdominal pain, nausea and vomiting, have GERD  Musculoskeletal:positive for arthralgias, and less right sided muscle weakness with improvement in leg strength, improved bilateral ankle pains - OA and no myalgias  Neurological: negative for coordination problems, dizziness, gait problems, headaches, paresthesia, have some tremors but able to draw and no vertigo  Psych: positive for depression, nervousness, anxiety, less stressed due to 's illness have improved    Objective:    Physical Exam    General appearance: alert, appears stated age, cooperative and  "no distress, decrease skin turgor.  Head: Normocephalic, without obvious abnormality, atraumatic  Eyes:  conjunctivae/corneas clear. PERRL, EOM's intact.    Neck: no adenopathy, no carotid bruit, no JVD, supple, symmetrical, trachea midline and thyroid not enlarged, symmetric, no tenderness/mass/nodules  Lungs:  clear to auscultation bilaterally  Chest wall: no tenderness  Heart: irregular rate and irregular rhythm, variable S1, S2 normal, occasional grade 2/6 systolic murmur, click, rub or gallop    Abdomen: soft, non-tender; bowel sounds normal; no masses,  no organomegaly, waist 31" hip 42"  Extremities: extremities no further weakness of the right upper extremity, atraumatic, no cyanosis and have no edema   Pulses: 2+ and symmetric    Assessment:       1. Paroxysmal atrial fibrillation, ablations X 2 1995, 1997, CHADS-VAS score 5, HAS-BLED score 5     2. Atrial fibrillation, unspecified type    3. AVILA (dyspnea on exertion)    4. Chronic diastolic heart failure, 2014    5. LVH (left ventricular hypertrophy) due to hypertensive disease, with heart failure         Plan:       Ayla was seen today for follow-up.    Diagnoses and associated orders for this visit:    Paroxysmal atrial fibrillation, ablations X 2 1995, 1997, CHADS-VAS score 5, HAS-BLED score 5   -     metoprolol tartrate (LOPRESSOR) 50 MG tablet; Take 1 tablet (50 mg total) by mouth 2 (two) times daily.  Dispense: 60 tablet; Refill: 11  -     Ambulatory consult to Electrophysiology  -     Holter monitor - 48 hour    Atrial fibrillation, unspecified type  -     EKG 12-lead    AVILA (dyspnea on exertion)  -     metoprolol tartrate (LOPRESSOR) 50 MG tablet; Take 1 tablet (50 mg total) by mouth 2 (two) times daily.  Dispense: 60 tablet; Refill: 11  -     Ambulatory consult to Electrophysiology    Chronic diastolic heart failure, 2014  -     metoprolol tartrate (LOPRESSOR) 50 MG tablet; Take 1 tablet (50 mg total) by mouth 2 (two) times daily.  Dispense: 60 " tablet; Refill: 11    LVH (left ventricular hypertrophy) due to hypertensive disease, with heart failure  -     metoprolol tartrate (LOPRESSOR) 50 MG tablet; Take 1 tablet (50 mg total) by mouth 2 (two) times daily.  Dispense: 60 tablet; Refill: 11    - CV status stable, off antiarrhythmic, all medications reviewed, patient acknowledge good understanding.  - Start BB for rate control, can take higher dose if rate consistently >110 bpm  - Get repeat Holter  - Phone review / [facility]  - Recommend at least biannual cardiovascular evaluation in view of her significant risk factors.    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 A  would be very helpful.    BMI 24.8 decreased to 20, up 21.3    Chronic pruritus, onset 3/2015 - resolved off Xanax     Depression    Anticoagulated    Stress at home - improved  - Highly recommend 30 minutes of exercise daily, can have Sunday off, with 2-3 sessions of muscle strengthening weekly. A  would be very helpful.  - Consider Yoga, Gilberto Chi, or meditation    Elevated brain natriuretic peptide (BNP) level, range 98 - 1045 - improved     Pulmonary hypertension, with right sided congestive heart failure, acute    Chest pain on exertion - resolved    Peripheral edema - imporved    Statin intolerance with Lipitor    Anxiety - imporved    Patient Active Problem List   Diagnosis    Paroxysmal atrial fibrillation, ablations X 2 1995, 1997, CHADS-VAS score 5, HAS-BLED score 5     Hypertension, 1985    Hyperlipidemia, baseline     GERD (gastroesophageal reflux disease)    Glaucoma (increased eye pressure)    Anxiety    Fibroid uterus    Thickened endometrium    Abnormal CT scan, chest    Interstitial lung disease    H/O: CVA (cerebrovascular accident), June 2014    LVH (left ventricular hypertrophy) due to  hypertensive disease    Cardiomegaly    BMI 24.8 decreased to 20, up 21.0    Anticoagulated    Depression    AVILA (dyspnea on exertion)    VPC (ventricular premature complex)    OA (osteoarthritis)    Chronic diastolic heart failure, 2014    Elevated brain natriuretic peptide (BNP) level, range 98 - 1045    Microalbuminuria    Hypoalbuminemia    TIA (transient ischemic attack), 11/3/2014    Statin intolerance    Chronic pruritus, onset 3/2015    Hypotension due to drugs    Sinus tachycardia, on Venlafaxine    Sepsis    Acute delirium    Cancer screening    Reflux esophagitis    Colon cancer screening     Total face-to-face time with the patient was 45 minutes and greater than 50% was spent in counseling and coordination of care. The above assessment and plan have been discussed at length. Labs and procedure over the last 6 months reviewed. Problem List updated. Asked to bring in all active medications / pills bottles with next visit.

## 2017-05-15 NOTE — TELEPHONE ENCOUNTER
----- Message from Alta Murillo sent at 5/15/2017  1:08 PM CDT -----  Contact: self  Patient called regarding refill of medication. Please contact [phone]     lorazepam (ATIVAN) 1 MG tablet    [facility]/pharmacy #[id number] - [place] - [address]  [address] [address]  Phone: [phone] Fax: [phone]

## 2017-05-15 NOTE — PATIENT INSTRUCTIONS
Having Catheter Ablation  A heart rhythm problem (arrhythmia) can make your heart beat too fast or in an irregular pattern. The problem is often caused by cells in your heart that arent working as they should. Or, it may be the result of an abnormal electric circuit. It may cause bothersome symptoms, such as an irregular heartbeat, dizziness, shortness of breath, chest pain, or fainting. Your doctor has recommended catheter ablation to treat your arrhythmia. This non-surgical procedure destroys the cells that are causing the problem.  Before the procedure    Before your catheter ablation, you will meet with the cardiac electrophysiologist (specially trained heart doctor) who will do the procedure. He or she will tell you how to get ready. You will likely be told to stop or change your heart rhythm medicines for a period of time before the procedure. Follow your doctors instructions. Also:  · Tell the doctor about all prescription and over-the-counter medicines you take. This includes herbs, supplements, and vitamins. It also includes daily medicines, such as insulin or blood thinners. If you are allergic to any medicines, tell the doctor.  · Have any routine tests, such as blood tests, as recommended.  · Dont eat or drink anything 12 hours before the procedure.  How catheter ablation is done  Catheter ablation uses thin, flexible wires (electrode catheters) to find and destroy (ablate) problem cells. Heres how the procedure is done:  · The hearts signals are mapped. To find the problem, an electrophysiology study (EPS) is done. During this study, the doctor tries to start (induce) your arrhythmia. An electrical map of the heart is then created. This shows the type of arrhythmia you have and where the problem is. Using the map as a guide, the doctor knows where to ablate.  · Problem areas are destroyed using heat or cold therapy. Once the EPS shows where the problem is, an electrode catheter is thread through  a blood vessel to that area in the heart. Energy is sent through the catheter to destroy the problem cells.  · The hearts rhythm is tested again. After ablating the problem cells, the doctor tries to restart (reinduce) your arrhythmia. If a fast rhythm cant be induced, the ablation is a success. But if a fast rhythm does start again, further ablation may be needed.  Your experience during catheter ablation  In most cases, catheter ablation is done in an electrophysiology (EP) lab. It often takes 2 to 4  hours, and sometimes longer. Youll receive medicine to prevent pain. Medicine will also help you relax or sleep during the procedure. If you feel uncomfortable during the procedure, tell the doctor or nurse:  · Getting started. First, skin on your groin (or rarely, the neck) is washed. Any hair in that area may be removed. This is where the catheters will be inserted. An IV (intravenous) line is started in your arm. Medicines and fluids are provided through this IV. To help keep the insertion site germ-free (sterile), your body is draped with sheets. Only the area where the catheters will be inserted is exposed.  · Inserting the catheters. The skin where the catheters will be inserted is numbed with a local anesthetic. This is so you wont feel pain. Then a small needle is used to make punctures in your vein or artery. Catheters are inserted through these punctures and guided to the heart with the help of X-ray monitors.  · Wires are then placed in various places in the heart to map the electrical signals as well as to stimulate the heart.  · Finishing up. When the procedure is finished, the catheters are taken out of your body. Pressure is applied to the puncture sites to stop any bleeding. No stitches are needed. Youre then taken to a recovery room to rest. You'll need to remain lying down for a few hours. You'll be asked not to move the leg where the catheters were inserted for a few hours.   Risks and  complications  The risks of catheter ablation are fairly low compared to the benefits you receive. Discuss these risks with your doctor before the procedure. Possible risks and complications include:  · Bleeding or bruising at the catheter insertion site  · Blood clots  · A slow heart rhythm (requiring a permanent pacemaker)  · Perforation of the heart muscle, blood vessel, or lung (may require an emergency procedure)  · Damage to a heart valve (rare)  · Stroke or heart attack, also known as acute myocardial infarction, or AMI (rare)  · Infection, which is a risk after any invasive procedure. This may be indicated by a fever of 100.4°F (38.0°C) or higher, drainage, or redness and pain at the catheter insertion site.  · Death (extremely rare)   Date Last Reviewed: 5/1/2016  © 9142-7458 [employer]. [address] [place] [address]. All rights reserved. This information is not intended as a substitute for professional medical care. Always follow your healthcare professional's instructions.        Discharge Instructions for Atrial Fibrillation  You have been diagnosed with an abnormal heart rhythm called atrial fibrillation. With this condition, your hearts 2 upper chambers quiver rather than squeeze the blood out in a normal pattern. This leads to an irregular and sometimes rapid heartbeat. Some people will develop associated symptoms such as a flip-flopping heartbeat, chest pain, lightheadedness, or shortness of breath. Other people may have no symptoms at all. Atrial fibrillation is serious because it affects the hearts ability to fill with blood as it should. Blood clots may form. This increases the risk for stroke. Untreated atrial fibrillation can also lead to heart failure. Atrial fibrillation can be controlled. With treatment, most people with atrial fibrillation lead normal lives.  Treatment options  Recommended treatment for atrial fibrillation depends on your age, symptoms, how long  you have had atrial fibrillation, and other factors. You will have a complete evaluation to find out if you have any abnormalities that caused your heart to go into atrial fibrillation. This might be blocked heart arteries or a thyroid problem. Your doctor will assess your particular case and discuss choices with you.  Treatment choices may include:  · Treating an underlying disorder that puts you at risk for atrial fibrillation. For example, correcting an abnormal thyroid or electrolyte problem, or treating a blocked heart artery.  · Restoring a normal heart rhythm with an electrical shock (cardioversion) or with an antiarrhythmic medicine (chemical cardioversion)  · Using medicine to control your heart rate in atrial fibrillation.  · Preventing the risk for blood clot and stroke using blood-thinning medicines. Your doctor will tell you what he or she recommends. Choices may include aspirin, clopidogrel, warfarin, dabigatran, rivaroxaban, apixaban, and edoxaban.  · Doing catheter ablation or a surgical maze procedure. These use different methods to destroy certain areas of heart tissue. This interrupts the electrical signals causing atrial fibrillation. One of these procedures may be a choice when medicines do not work, or as an alternative to long-term medicine.  · Other treatment choices may be recommended for you by your doctor.  Managing risk factors for stroke and preventing heart failure are important parts of any treatment plan for atrial fibrillation.  Home care  · Take your medicines exactly as directed. Dont skip doses.  · Work with your doctor to find the right medicines and doses for you.  · Learn to take your own pulse. Keep a record of your results. Ask your doctor which pulse rates mean that you need medical attention. Slowing your pulse is often the goal of treatment. Ask your doctor if its OK for you to use an automatic machine to check your pulse at home. Sometimes these machines dont count the  pulse correctly when you have atrial fibrillation.  · Limit your intake of coffee, tea, cola, and other beverages with caffeine. Talk with your doctor about whether you should eliminate caffeine.  · Avoid over-the-counter medicines that have caffeine in them.  · Let your doctor know what medicines you take, including prescription and over-the-counter medicines, as well as any supplements. They interfere with some medicines given for atrial fibrillation.  · Ask your doctor about whether you can drink alcohol. Some people need to avoid alcohol to better treat atrial fibrillation. If you are taking blood-thinner medicines, alcohol may interfere with them by increasing their effect.  · Never take stimulants such as amphetamines or cocaine. These drugs can speed up your heart rate and trigger atrial fibrillation.  Follow-up care  Follow up with your doctor, or as advised.     When should I call my healthcare provider  Call your healthcare provider right away if you have any of the following:  · Weakness  · Dizziness  · Fainting  · Fatigue  · Shortness of breath  · Chest pain with increased activity  · A change in the usual regularity of your heartbeat, or an unusually fast heartbeat   Date Last Reviewed: 4/23/2016  © 1435-6597 [employer]. [address] [place] [address]. All rights reserved. This information is not intended as a substitute for professional medical care. Always follow your healthcare professional's instructions.        Understanding Atrial Fibrillation    An arrhythmia is any problem with the speed or pattern of the heartbeat. Atrial fibrillation (AFib) is a common type of arrhythmia. It causes fast, chaotic electrical signals in the atria. This leads to poor functioning of the heart. It also affects how much blood your heart can pump out to the body.  Afib may occur once in a while and go away on its own. Or it may continue for longer periods and need treatment.  AFib can lead to  serious problems, such as stroke. Your healthcare provider will need to monitor and manage it.  What happens during atrial fibrillation?   The heart has an electrical system that sends signals to control the heartbeat. As signals move through the heart, they tell the hearts upper chambers (atria) and lower chambers (ventricles) when to squeeze (contract) and relax. This lets blood move through the heart and out to the body and lungs.  With AFib, the atria receive abnormal signals. This causes them to contract in a fast and irregular way, and out of sync with the ventricles. When this happens, the atria also have a harder time moving blood into the ventricles. Blood may then pool in the atria, which increases the risk for blood clots and stroke. The ventricles also may contract too quickly and irregularly. As a result, they may not pump blood to the body and lungs as well as they should. This can weaken the heart muscle over time and cause heart failure.  What causes atrial fibrillation?  AFib is more common in older adults. It has many possible causes including:  · Coronary artery disease  · Heart valve disease  · Heart attack  · Heart surgery  · High blood pressure  · Thyroid disease  · Diabetes  · Lung disease  · Sleep apnea  · Heavy alcohol use  In some cases of AFib, doctors do not know the cause.  What are the symptoms of atrial fibrillation?  AFib may or may not cause symptoms. If symptoms do occur, they may include:  · A fast, pounding, irregular heartbeat  · Shortness of breath  · Tiredness  · Dizziness or fainting  · Chest pain  How is atrial fibrillation treated?  Treatments for AFib can include any of the options below.  · Medicines. You may be prescribed:  ¨ Heart rate medicines to help slow down the heartbeat  ¨ Heart rhythm medicines to help the heart beat more regularly  ¨ Anti-clotting medicines to help reduce the risk for blood clots and stroke  · Electrical cardioversion. Your healthcare provider  uses special pads or paddles to send one or more brief electrical shocks to the heart. This can help reset the heartbeat to normal.  · Ablation. Long, thin tubes called catheters are threaded through a blood vessel to the heart. There, the catheters send out hot or cold energy to the areas causing the abnormal signals. This energy destroys the problem tissue or cells. This improves the chances that your heart will stay in normal rhythm without using medicines. If your heart rate and rhythm cant be controlled, you may need ablation and a pacemaker. These will help control the heart rate and regularity of the heartbeat.  · Surgery. During surgery, your healthcare provider may use different methods to create scar tissue in the areas of the heart causing the abnormal signals. The scar tissue disrupts the abnormal signals and may stop AFib from occurring.  What are the complications of atrial fibrillation?  These can include:  · Blood clots  · Stroke  · Heart failure. This problem occurs when the heart muscle weakens so much that it can no longer pump blood well.  When should I call my healthcare provider?  Call your healthcare provider right away if you have any of these:  · Symptoms that dont get better with treatment, or get worse  · New symptoms   Date Last Reviewed: 5/1/2016  © 1983-1363 [employer]. [address] [place] [address], [place] [address]. All rights reserved. This information is not intended as a substitute for professional medical care. Always follow your healthcare professional's instructions.        What Is Atrial Flutter/Atrial Fibrillation?      The heart has its own electrical system. This system makes the signals that start each heartbeat. The heartbeat begins in 1 of the 2 upper chambers of the heart (atria). A problem can make the atria beat faster than normal. The atria may beat fast but still evenly. This problem is called atrial flutter. If the atria beat very fast and also unevenly,  it is called atrial fibrillation (AFib).  Causes of Atrial Flutter and Atrial Fibrillation  Causes of these problems can include:  · Previous heart attack  · High blood pressure  · Thyroid problems  In many cases, the cause is unknown.  When the Atria Beat Too Fast  The atria may beat fast only once in a while. This is called a paroxysmal heart rhythm problem. If they beat fast all the time, it is a chronic problem.  Atrial Flutter  With atrial flutter, electrical signals travel around and around inside the atria. These circling signals make the atria beat too fast:  · Atrial flutter can cause symptoms similar to AFib. It can also lead to the even faster, uneven rhythms of AFib.  Atrial Fibrillation (AFib)  With AFib, cells in the atria send extra electrical signals. These extra signals make the atria beat very fast. They also beat unevenly:  · The atria beat so fast and unevenly that they may quiver instead of yoan. If the atria dont contract, they dont move enough blood into the 2 lower chambers of the heart (ventricles). This can cause you to feel dizzy or weak.  · Blood that doesnt keep moving can pool and form clots in the atria. These clots can move into other parts of the body and cause serious problems such as a stroke.   Symptoms of Atrial Flutter and AFib  These symptoms include the following:  · Palpitations (a fluttering, fast heartbeat)  · Weakness or tiredness  · Shortness of breath  · Chest pain or tightness  · Dizziness or lightheadedness  · Fainting spells   Date Last Reviewed: 2/26/2014  © 9699-4205 [employer]. [address] [place] [address], [place] [address]. All rights reserved. This information is not intended as a substitute for professional medical care. Always follow your healthcare professional's instructions.

## 2017-05-15 NOTE — TELEPHONE ENCOUNTER
----- Message from Danielle Aguilar sent at 5/15/2017  1:21 PM CDT -----  Contact: self  Patient is returning call regarding prescription.  Please call patient at [phone].  Thanks!

## 2017-05-26 ENCOUNTER — TELEPHONE (OUTPATIENT)
Dept: CARDIOLOGY | Facility: CLINIC | Age: 72
End: 2017-05-26

## 2017-05-26 NOTE — TELEPHONE ENCOUNTER
Called in temporary prescription to pharmacy requested by pt. Advised pt that the meds have been called in also.

## 2017-05-26 NOTE — TELEPHONE ENCOUNTER
----- Message from Barrett Jurado MD sent at 5/26/2017  1:53 PM CDT -----  OK    Dr. Jurado    ----- Message -----  From: Rita Alejandro MA  Sent: 5/26/2017  10:37 AM  To: MD Dr Adrien Magallanes,    Pt prescribed 50mg BID.  Pt advised that she doubled her dosage on her own, taking 100mg BID, per your suggestion.   Nothing in notes about increase.     Are you OK with increase and new prescription?    Please advise.     Thanks,  Rita  ----- Message -----  From: Elsi SUAREZ Ashwini  Sent: 5/26/2017   9:55 AM  To: Adrien CHANDRA Staff    Patient requested refill on  Metoprolol Tartrate , she has increased per doctor's orders to 100 mg in the morning and at night, she states due to this she is out of medicaton  call into [facility] pharmacy at [address] [place] [address], at [phone] in [facility]. Please call patient at  [phone] if you have any questions. Thank you.

## 2017-05-26 NOTE — TELEPHONE ENCOUNTER
Spoke to patient gave information from Dr Adrien rocha to take 100 mg metoprolol tartrated BID.   Patient stated that she understands and agrees

## 2017-05-26 NOTE — TELEPHONE ENCOUNTER
----- Message from Barrett Jurado MD sent at 5/26/2017  1:53 PM CDT -----  OK    Dr. Jurado    ----- Message -----  From: Rita Alejandro MA  Sent: 5/26/2017  10:37 AM  To: MD Dr Adrien Magallanes,    Pt prescribed 50mg BID.  Pt advised that she doubled her dosage on her own, taking 100mg BID, per your suggestion.   Nothing in notes about increase.     Are you OK with increase and new prescription?    Please advise.     Thanks,  Rita  ----- Message -----  From: Elsi SUAREZ Ashwini  Sent: 5/26/2017   9:55 AM  To: Adrien CHANDRA Staff    Patient requested refill on  Metoprolol Tartrate , she has increased per doctor's orders to 100 mg in the morning and at night, she states due to this she is out of medicaton  call into [facility] pharmacy at [address] [place] [address], at [phone] in [facility]. Please call patient at  [phone] if you have any questions. Thank you.

## 2017-05-29 DIAGNOSIS — R06.09 DOE (DYSPNEA ON EXERTION): ICD-10-CM

## 2017-05-29 DIAGNOSIS — I50.32 CHRONIC DIASTOLIC HEART FAILURE: ICD-10-CM

## 2017-05-29 DIAGNOSIS — I11.0 LVH (LEFT VENTRICULAR HYPERTROPHY) DUE TO HYPERTENSIVE DISEASE, WITH HEART FAILURE: ICD-10-CM

## 2017-05-29 DIAGNOSIS — I48.0 PAROXYSMAL ATRIAL FIBRILLATION: ICD-10-CM

## 2017-05-29 RX ORDER — METOPROLOL TARTRATE 100 MG/1
100 TABLET ORAL 2 TIMES DAILY
COMMUNITY
End: 2017-05-29 | Stop reason: SDUPTHER

## 2017-05-29 RX ORDER — METOPROLOL TARTRATE 100 MG/1
100 TABLET ORAL 2 TIMES DAILY
Qty: 120 TABLET | Refills: 5 | Status: SHIPPED | OUTPATIENT
Start: 2017-05-29 | End: 2017-11-01 | Stop reason: SDUPTHER

## 2017-06-10 DIAGNOSIS — E78.5 HYPERLIPIDEMIA: ICD-10-CM

## 2017-06-10 DIAGNOSIS — I63.032 CEREBRAL INFARCTION DUE TO THROMBOSIS OF LEFT CAROTID ARTERY: ICD-10-CM

## 2017-06-10 RX ORDER — ROSUVASTATIN CALCIUM 40 MG/1
40 TABLET, COATED ORAL NIGHTLY
Qty: 90 TABLET | Refills: 3 | Status: SHIPPED | OUTPATIENT
Start: 2017-06-10 | End: 2018-07-14 | Stop reason: SDUPTHER

## 2017-07-12 ENCOUNTER — OFFICE VISIT (OUTPATIENT)
Dept: CARDIOLOGY | Facility: CLINIC | Age: 72
End: 2017-07-12
Payer: MEDICARE

## 2017-07-12 VITALS
WEIGHT: 138 LBS | BODY MASS INDEX: 21.66 KG/M2 | SYSTOLIC BLOOD PRESSURE: 141 MMHG | DIASTOLIC BLOOD PRESSURE: 63 MMHG | HEIGHT: 67 IN | HEART RATE: 66 BPM

## 2017-07-12 DIAGNOSIS — G45.8 OTHER SPECIFIED TRANSIENT CEREBRAL ISCHEMIAS: ICD-10-CM

## 2017-07-12 DIAGNOSIS — I50.32 CHRONIC DIASTOLIC HEART FAILURE: ICD-10-CM

## 2017-07-12 DIAGNOSIS — I10 ESSENTIAL HYPERTENSION: ICD-10-CM

## 2017-07-12 DIAGNOSIS — Z79.01 ANTICOAGULATED: ICD-10-CM

## 2017-07-12 DIAGNOSIS — I48.91 ATRIAL FIBRILLATION, UNSPECIFIED TYPE: ICD-10-CM

## 2017-07-12 DIAGNOSIS — I48.4 ATYPICAL ATRIAL FLUTTER: ICD-10-CM

## 2017-07-12 DIAGNOSIS — J84.9 INTERSTITIAL LUNG DISEASE: ICD-10-CM

## 2017-07-12 DIAGNOSIS — E78.2 MIXED HYPERLIPIDEMIA: ICD-10-CM

## 2017-07-12 DIAGNOSIS — I11.9 LVH (LEFT VENTRICULAR HYPERTROPHY) DUE TO HYPERTENSIVE DISEASE, WITHOUT HEART FAILURE: ICD-10-CM

## 2017-07-12 DIAGNOSIS — I48.0 PAROXYSMAL ATRIAL FIBRILLATION: Primary | ICD-10-CM

## 2017-07-12 PROBLEM — I48.92 ATRIAL FLUTTER: Status: ACTIVE | Noted: 2017-07-12

## 2017-07-12 PROCEDURE — 1126F AMNT PAIN NOTED NONE PRSNT: CPT | Mod: ,,, | Performed by: INTERNAL MEDICINE

## 2017-07-12 PROCEDURE — 1159F MED LIST DOCD IN RCRD: CPT | Mod: ,,, | Performed by: INTERNAL MEDICINE

## 2017-07-12 PROCEDURE — 99205 OFFICE O/P NEW HI 60 MIN: CPT | Mod: S$PBB,,, | Performed by: INTERNAL MEDICINE

## 2017-07-12 PROCEDURE — 93010 ELECTROCARDIOGRAM REPORT: CPT | Mod: S$PBB,,, | Performed by: INTERNAL MEDICINE

## 2017-07-12 PROCEDURE — 93005 ELECTROCARDIOGRAM TRACING: CPT | Mod: PBBFAC,PO | Performed by: INTERNAL MEDICINE

## 2017-07-12 PROCEDURE — 99212 OFFICE O/P EST SF 10 MIN: CPT | Mod: PBBFAC,PO,25 | Performed by: INTERNAL MEDICINE

## 2017-07-12 PROCEDURE — 99999 PR PBB SHADOW E&M-EST. PATIENT-LVL II: CPT | Mod: PBBFAC,,, | Performed by: INTERNAL MEDICINE

## 2017-07-12 NOTE — LETTER
July 12, 2017      Barrett Jurado MD  [address]  [address]  [place] [address]           [facility]  [address], [address]  [facility] [address]  Phone: [phone]          Patient: Ayla Dela Cruz   MR Number: [id number]   YOB: 1945   Date of Visit: 7/12/2017       Dear Dr. Barrett Jurado:    Thank you for referring Ayla Dela Cruz to me for evaluation. Attached you will find relevant portions of my assessment and plan of care.    If you have questions, please do not hesitate to call me. I look forward to following Ayla Dela Cruz along with you.    Sincerely,    Fco Calderon MD    Enclosure  CC:  No Recipients    If you would like to receive this communication electronically, please contact [email or web address] or [phone] to request more information on [employer] Link access.    For providers and/or their staff who would like to refer a patient to [facility], please contact us through our one-stop-shop provider referral line, [facility], at [phone].    If you feel you have received this communication in error or would no longer like to receive these types of communications, please e-mail [email or web address]

## 2017-07-12 NOTE — PROGRESS NOTES
Subjective:      HPI    Cardiologist: Barrett Jurado MD    I had the pleasure of seeing Ayla Dela Cruz in consultation at your request for the evaluation of atrial arrhythmias. She is a 72 year old female with a history of HTN, HLD, TIA in 2014, whose history of arrhythmias dates back to her 30s when she began to experience sudden onset palpitations. She was started on Tambacor at that time, which improved her symptoms. She was started on Coumadin at age 55 years. In the mid-1990s, she underwent an ablation for what sounds like atrial flutter in [place], [place]. In 1997 she underwent a second ablation which she was told was unsuccessful. Since that time she has undergone two electrical cardioversions, once in the mid-2000s (which lasted 5 years), and another around 2010. Around 2014 her arrhythmias recurred around the time she had her TIA. She was started on Xarelto and restarted on Tambacor at that time. Notably, on 100 mg bid she was having AF recurrences and SOB. The dose was lowered to 50 mg bid and eventually stopped. She is currently only on Metoprolol.    Recent cardiac studies include an echo from 10/2016 which showed an EF of 65-70%, normal LA size, and a PASP of 64 mmHg (EF 40% in 11/2014).    I reviewed all ECGs in the EMR. ECGs from 2282-8941 show sinus rhythm. ECGs from 1/2014 and 4/2014 show AF. ECGs between 6/2014 and 1/2016 show sinus bradycardia and NSR. All ECGs between 1/16/2016 and 5/2017 show either AF or AFL. When in AFL, RVR is generally seen.    I reviewed today's ECG tracing, which shows sinus rhythm at 62 bpm.    Review of Systems   Constitution: Negative for decreased appetite, malaise/fatigue, weight gain and weight loss.   HENT: Negative for sore throat.    Eyes: Negative for blurred vision.   Cardiovascular: Negative for chest pain, dyspnea on exertion, irregular heartbeat, leg swelling, near-syncope, orthopnea, palpitations, paroxysmal nocturnal dyspnea and syncope.    Respiratory: Negative for shortness of breath.    Skin: Negative for rash.   Musculoskeletal: Negative for arthritis.   Gastrointestinal: Negative for abdominal pain.   Neurological: Negative for focal weakness.   Psychiatric/Behavioral: Negative for altered mental status.        Objective:    Physical Exam      Assessment:       1. Paroxysmal atrial fibrillation, ablations X 2 1995, 1997, CHADS-VAS score 5, HAS-BLED score 5     2. Atypical atrial flutter    3. Other specified transient cerebral ischemias    4. Interstitial lung disease    5. Essential hypertension    6. LVH (left ventricular hypertrophy) due to hypertensive disease, without heart failure    7. Chronic diastolic heart failure, 2014    8. Mixed hyperlipidemia    9. Anticoagulated         Plan:   In summary, Ayla Dela Cruz is a 72 year old female with a longstanding history of atrial arrhythmias. Her [id number] Score is 5 (age, female, TIA, HTN) so she should remain on anticoagulation indefinitely. She has failed Flecainide. We discussed the options available to her including antiarrhythmics and catheter ablation. We discussed the risks, benefits, indications, and alternatives to PVI. Risks discussed included vascular damage, hematoma, cardiac tamponade, stroke, AE fistula, PV stenosis, phrenic nerve damage, and death. After considering her options, she has decided to proceed with PVI.    Thank you for allowing me to participate in the care of this patient. Please do not hesitate to call me with any questions or concerns.

## 2017-07-13 ENCOUNTER — PATIENT MESSAGE (OUTPATIENT)
Dept: CARDIOLOGY | Facility: CLINIC | Age: 72
End: 2017-07-13

## 2017-07-13 DIAGNOSIS — I48.0 PAROXYSMAL ATRIAL FIBRILLATION: Primary | ICD-10-CM

## 2017-07-27 DIAGNOSIS — I48.0 PAROXYSMAL ATRIAL FIBRILLATION: Primary | ICD-10-CM

## 2017-07-27 NOTE — PROGRESS NOTES
ABLATION EDUCATION CHECKLIST    8/9/17 -CTA Chest @ 8AM @[facility]  FAST 4 HOURS prior to this test!  NO CAFFEINE the day of test!    9/5/17 -Labs  PRE - PROCEDURE LABS HAVE BEEN ORDERED FOR YOU @ [facility]  BE SURE TO ARRIVE AT YOUR SCHEDULED TIME FOR THIS LAB WORK!  (YOU DO NOT HAVE TO FAST FOR THIS LABWORK!!!!)    9/8/17 @ 6 AM -FELICIA/Ablation  Report to Cardiology Waiting Room on 3rd floor of the Hospital    (Do not report to clinic)  Directions for Reporting to Cardiology Waiting Area in the Hospital  If you park in the Parking Garage:  Take elevators to the 2nd floor  Walk up ramp and turn right by Gold Elevators  Take elevator to the 3rd floor  Upon exiting the elevator, turn away from the clinic areas  Walk long garcía around to front of hospital to area with windows overlooking [facility]  Check in at Reception Desk  OR  If family is dropping you off:  Have them drop you off at the front of the Hospital  (Near the ER, where all the flags are hung).  Take the E elevators to the 3rd floor.  Check in at the Reception Desk in the waiting room.    Do not eat or drink anything after: 12 midnight on the night before your procedure    Medications:   HOLD your Xarelto 1 day prior to your procedure. Last dose: Wednesday, September 6, 2017.  You may take your usual morning medications with a sip of water.    You will be spending the night after your procedure  You will need someone to drive you home the day after your procedure.    Your pain during your procedure will be managed by the anesthesia team.     THE ABOVE INSTRUCTIONS WERE GIVEN TO THE PATIENT VERBALLY AND THEY VERBALIZED UNDERSTANDING.  THEY DO NOT REQUIRE ANY SPECIAL NEEDS AND DO NOT HAVE ANY LEARNING BARRIERS.    Any need to reschedule or cancel procedures, or any questions regarding your procedures should be addressed directly with the Arrhythmia Department Nurses at the following phone number: [phone]

## 2017-08-09 ENCOUNTER — HOSPITAL ENCOUNTER (OUTPATIENT)
Dept: RADIOLOGY | Facility: HOSPITAL | Age: 72
Discharge: HOME | End: 2017-08-09
Attending: INTERNAL MEDICINE
Payer: MEDICARE

## 2017-08-09 DIAGNOSIS — I48.4 ATYPICAL ATRIAL FLUTTER: Primary | ICD-10-CM

## 2017-08-09 DIAGNOSIS — I48.0 PAROXYSMAL ATRIAL FIBRILLATION: ICD-10-CM

## 2017-08-11 ENCOUNTER — HOSPITAL ENCOUNTER (OUTPATIENT)
Dept: RADIOLOGY | Facility: HOSPITAL | Age: 72
Discharge: HOME | End: 2017-08-11
Attending: INTERNAL MEDICINE
Payer: MEDICARE

## 2017-08-11 PROCEDURE — 25500020 PHARM REV CODE 255: Performed by: INTERNAL MEDICINE

## 2017-08-11 PROCEDURE — 75572 CT HRT W/3D IMAGE: CPT | Mod: TC

## 2017-08-11 PROCEDURE — 75572 CT HRT W/3D IMAGE: CPT | Mod: 26,GC,, | Performed by: RADIOLOGY

## 2017-08-11 RX ADMIN — IOHEXOL 75 ML: 350 INJECTION, SOLUTION INTRAVENOUS at 03:08

## 2017-08-15 ENCOUNTER — TELEPHONE (OUTPATIENT)
Dept: FAMILY MEDICINE | Facility: CLINIC | Age: 72
End: 2017-08-15

## 2017-08-15 ENCOUNTER — TELEPHONE (OUTPATIENT)
Dept: ELECTROPHYSIOLOGY | Facility: CLINIC | Age: 72
End: 2017-08-15

## 2017-08-15 ENCOUNTER — PATIENT MESSAGE (OUTPATIENT)
Dept: FAMILY MEDICINE | Facility: CLINIC | Age: 72
End: 2017-08-15

## 2017-08-15 DIAGNOSIS — E04.1 RIGHT THYROID NODULE: Primary | ICD-10-CM

## 2017-08-15 NOTE — TELEPHONE ENCOUNTER
----- Message from Fco Calderon MD sent at 8/15/2017  1:23 PM CDT -----  Ben Church,    I am scheduled to do an AF ablation on our mutual pt in the near future.    On the pre-procedure CT there was a 1.7 cm thyroid mass.    Wanted to make you aware that this probably needs to be worked up.    I will call pt and let her know as well, but will defer work-up to you.    Fco Calderon MD

## 2017-08-15 NOTE — TELEPHONE ENCOUNTER
Dr. Olivo, I received this message from Dr. Calderon. I have never met the patient, it may be that I cosigned an order for Krys.  I wanted to pass it on to you regarding her thyroid mass.    Lynnette

## 2017-08-15 NOTE — TELEPHONE ENCOUNTER
Had ct chest for planned cardiac ablation.  Noted right thyroid nodule.  Order in for thyroid ultrasound.  May need biopsy

## 2017-08-15 NOTE — TELEPHONE ENCOUNTER
----- Message from Fco Calderon MD sent at 8/15/2017  1:27 PM CDT -----  I want to make you aware that on a pre procedure CT scan performed in preparation for AF ablation a 1.7 cm thyroid mass was noted.    I will defer work-up to your office but wanted to make you aware.    Thanks Fco Calderon MD

## 2017-08-15 NOTE — TELEPHONE ENCOUNTER
Spoke to  who answered Ms. Dela Cruz's phone and informed him of thyroid lesion noted on CT scan.    Explained to him that I notified Dr. Olivo and Dr. Katz as well, and will defer work-up to them.    GP

## 2017-08-16 NOTE — TELEPHONE ENCOUNTER
"Received message from patient's  via Portal.       "Dr. Olivo,  I received a call from Dr. Calderon today and he said that the CT Scan he had done showed a lesion on Ayla's thyroid when he was looking at her heart for an oblation he will do on Sept. 8.     I am not telling her about this until we return from our visit to [place], so she does not worry about while we are there.  Please do not contact her about it in the meantime.  I don't think there should be a problem to wait?   Please advise if there is.   We are celebration our 50th wedding anniversary by going to [place] where my dad was born.     Praveen Dela Cruz >< > "  "

## 2017-08-24 ENCOUNTER — TELEPHONE (OUTPATIENT)
Dept: CARDIOLOGY | Facility: CLINIC | Age: 72
End: 2017-08-24

## 2017-08-25 ENCOUNTER — TELEPHONE (OUTPATIENT)
Dept: CARDIOLOGY | Facility: CLINIC | Age: 72
End: 2017-08-25

## 2017-08-28 ENCOUNTER — TELEPHONE (OUTPATIENT)
Dept: CARDIOLOGY | Facility: CLINIC | Age: 72
End: 2017-08-28

## 2017-08-29 ENCOUNTER — TELEPHONE (OUTPATIENT)
Dept: CARDIOLOGY | Facility: CLINIC | Age: 72
End: 2017-08-29

## 2017-09-02 ENCOUNTER — PATIENT MESSAGE (OUTPATIENT)
Dept: FAMILY MEDICINE | Facility: CLINIC | Age: 72
End: 2017-09-02

## 2017-09-05 ENCOUNTER — LAB VISIT (OUTPATIENT)
Dept: LAB | Facility: HOSPITAL | Age: 72
End: 2017-09-05
Attending: INTERNAL MEDICINE
Payer: MEDICARE

## 2017-09-05 ENCOUNTER — TELEPHONE (OUTPATIENT)
Dept: CARDIOLOGY | Facility: CLINIC | Age: 72
End: 2017-09-05

## 2017-09-05 DIAGNOSIS — I48.0 PAROXYSMAL ATRIAL FIBRILLATION: ICD-10-CM

## 2017-09-05 LAB
ANION GAP SERPL CALC-SCNC: 10 MMOL/L
APTT BLDCRRT: 22.4 SEC
BASOPHILS # BLD AUTO: 0.01 K/UL
BASOPHILS NFR BLD: 0.1 %
BUN SERPL-MCNC: 22 MG/DL
CALCIUM SERPL-MCNC: 9.2 MG/DL
CHLORIDE SERPL-SCNC: 106 MMOL/L
CO2 SERPL-SCNC: 24 MMOL/L
CREAT SERPL-MCNC: 0.9 MG/DL
DIFFERENTIAL METHOD: ABNORMAL
EOSINOPHIL # BLD AUTO: 0 K/UL
EOSINOPHIL NFR BLD: 0.1 %
ERYTHROCYTE [DISTWIDTH] IN BLOOD BY AUTOMATED COUNT: 16.9 %
EST. GFR  (AFRICAN AMERICAN): >60 ML/MIN/1.73 M^2
EST. GFR  (NON AFRICAN AMERICAN): >60 ML/MIN/1.73 M^2
GLUCOSE SERPL-MCNC: 163 MG/DL
HCT VFR BLD AUTO: 39.7 %
HGB BLD-MCNC: 12.4 G/DL
INR PPP: 1.1
LYMPHOCYTES # BLD AUTO: 1.1 K/UL
LYMPHOCYTES NFR BLD: 7.7 %
MCH RBC QN AUTO: 28.4 PG
MCHC RBC AUTO-ENTMCNC: 31.2 G/DL
MCV RBC AUTO: 91 FL
MONOCYTES # BLD AUTO: 1.3 K/UL
MONOCYTES NFR BLD: 9.5 %
NEUTROPHILS # BLD AUTO: 11.5 K/UL
NEUTROPHILS NFR BLD: 82.3 %
PLATELET # BLD AUTO: 308 K/UL
PMV BLD AUTO: 9.6 FL
POTASSIUM SERPL-SCNC: 3.6 MMOL/L
PROTHROMBIN TIME: 12 SEC
RBC # BLD AUTO: 4.37 M/UL
SODIUM SERPL-SCNC: 140 MMOL/L
WBC # BLD AUTO: 13.95 K/UL

## 2017-09-05 PROCEDURE — 85025 COMPLETE CBC W/AUTO DIFF WBC: CPT

## 2017-09-05 PROCEDURE — 85730 THROMBOPLASTIN TIME PARTIAL: CPT

## 2017-09-05 PROCEDURE — 36415 COLL VENOUS BLD VENIPUNCTURE: CPT | Mod: PO

## 2017-09-05 PROCEDURE — 80048 BASIC METABOLIC PNL TOTAL CA: CPT

## 2017-09-05 PROCEDURE — 85610 PROTHROMBIN TIME: CPT

## 2017-09-05 NOTE — TELEPHONE ENCOUNTER
Patient called about FELICIA scheduled on 9/9/17 . Pre-procedural questions asked.  Denies swallowing issues and esophageal issues, other than with pills. Denies previous sedation/anesthesia problems, other than with morphine. States does not snore or have sleep apnea.  Denies recent trauma/surgery/radiation therapy to head/neck/airway. States is able to move neck without difficulty. FELICIA described to patient. Instructed NPO past midnight and to have a designated  to drive patient home after the procedures due to sedation being given. Instructed to report to   sscu at 0600 am.Medications:   HOLD your Xarelto 1 day prior to your procedure. Last dose: Wednesday, September 6, 2017.  You may take your usual morning medications with a sip of water   Verbalizes understanding. Questions answered.

## 2017-09-06 ENCOUNTER — HOSPITAL ENCOUNTER (OUTPATIENT)
Dept: RADIOLOGY | Facility: CLINIC | Age: 72
Discharge: HOME | End: 2017-09-06
Attending: FAMILY MEDICINE
Payer: MEDICARE

## 2017-09-06 DIAGNOSIS — E04.1 RIGHT THYROID NODULE: ICD-10-CM

## 2017-09-06 PROCEDURE — 76536 US EXAM OF HEAD AND NECK: CPT | Mod: 26,,, | Performed by: RADIOLOGY

## 2017-09-06 PROCEDURE — 76536 US EXAM OF HEAD AND NECK: CPT | Mod: TC,PO

## 2017-09-06 NOTE — TELEPHONE ENCOUNTER
This is now the fourth time at least that I have received this message.  Please see my reply and the thyroid ultrasound order from August 15

## 2017-09-07 ENCOUNTER — ANESTHESIA EVENT (OUTPATIENT)
Dept: MEDSURG UNIT | Facility: HOSPITAL | Age: 72
End: 2017-09-07
Payer: MEDICARE

## 2017-09-07 ENCOUNTER — HOSPITAL ENCOUNTER (OUTPATIENT)
Facility: HOSPITAL | Age: 72
Discharge: HOME | End: 2017-09-08
Attending: INTERNAL MEDICINE | Admitting: INTERNAL MEDICINE
Payer: MEDICARE

## 2017-09-07 ENCOUNTER — ANESTHESIA (OUTPATIENT)
Dept: MEDSURG UNIT | Facility: HOSPITAL | Age: 72
End: 2017-09-07
Payer: MEDICARE

## 2017-09-07 ENCOUNTER — HOSPITAL ENCOUNTER (OUTPATIENT)
Dept: CARDIOLOGY | Facility: CLINIC | Age: 72
Discharge: HOME | End: 2017-09-07
Attending: INTERNAL MEDICINE | Admitting: INTERNAL MEDICINE
Payer: MEDICARE

## 2017-09-07 ENCOUNTER — SURGERY (OUTPATIENT)
Age: 72
End: 2017-09-07

## 2017-09-07 DIAGNOSIS — I48.91 ATRIAL FIBRILLATION: ICD-10-CM

## 2017-09-07 DIAGNOSIS — I48.92 ATRIAL FLUTTER: ICD-10-CM

## 2017-09-07 DIAGNOSIS — I48.91 A-FIB: ICD-10-CM

## 2017-09-07 DIAGNOSIS — I48.0 PAROXYSMAL ATRIAL FIBRILLATION: Primary | ICD-10-CM

## 2017-09-07 LAB
ABO + RH BLD: NORMAL
AORTIC VALVE REGURGITATION: NORMAL
BLD GP AB SCN CELLS X3 SERPL QL: NORMAL
DIASTOLIC DYSFUNCTION: NO
GLOBAL PERICARDIAL EFFUSION: NORMAL
MITRAL VALVE MOBILITY: NORMAL
MITRAL VALVE REGURGITATION: NORMAL
POC ACTIVATED CLOTTING TIME K: 213 SEC (ref 74–137)
POC ACTIVATED CLOTTING TIME K: 285 SEC (ref 74–137)
POC ACTIVATED CLOTTING TIME K: 351 SEC (ref 74–137)
POC ACTIVATED CLOTTING TIME K: 389 SEC (ref 74–137)
POC ACTIVATED CLOTTING TIME K: 389 SEC (ref 74–137)
POC ACTIVATED CLOTTING TIME K: 450 SEC (ref 74–137)
RETIRED EF AND QEF - SEE NOTES: 65 (ref 55–65)
SAMPLE: ABNORMAL
TRICUSPID VALVE REGURGITATION: NORMAL

## 2017-09-07 PROCEDURE — 93355 ECHO TRANSESOPHAGEAL (TEE): CPT

## 2017-09-07 PROCEDURE — 25000003 PHARM REV CODE 250

## 2017-09-07 PROCEDURE — 93662 INTRACARDIAC ECG (ICE): CPT | Mod: 26,,, | Performed by: INTERNAL MEDICINE

## 2017-09-07 PROCEDURE — 93005 ELECTROCARDIOGRAM TRACING: CPT

## 2017-09-07 PROCEDURE — 93312 ECHO TRANSESOPHAGEAL: CPT | Mod: 26,,, | Performed by: INTERNAL MEDICINE

## 2017-09-07 PROCEDURE — 27201037 HC PRESSURE MONITORING SET UP

## 2017-09-07 PROCEDURE — 93662 INTRACARDIAC ECG (ICE): CPT

## 2017-09-07 PROCEDURE — 93325 DOPPLER ECHO COLOR FLOW MAPG: CPT | Mod: 26,,, | Performed by: INTERNAL MEDICINE

## 2017-09-07 PROCEDURE — 37000008 HC ANESTHESIA 1ST 15 MINUTES: Performed by: INTERNAL MEDICINE

## 2017-09-07 PROCEDURE — 93010 ELECTROCARDIOGRAM REPORT: CPT | Mod: 77,,, | Performed by: INTERNAL MEDICINE

## 2017-09-07 PROCEDURE — 93655 ICAR CATH ABLTJ DSCRT ARRHYT: CPT | Mod: ,,, | Performed by: INTERNAL MEDICINE

## 2017-09-07 PROCEDURE — 25000003 PHARM REV CODE 250: Performed by: NURSE ANESTHETIST, CERTIFIED REGISTERED

## 2017-09-07 PROCEDURE — 63600175 PHARM REV CODE 636 W HCPCS: Performed by: NURSE PRACTITIONER

## 2017-09-07 PROCEDURE — 63600175 PHARM REV CODE 636 W HCPCS: Performed by: NURSE ANESTHETIST, CERTIFIED REGISTERED

## 2017-09-07 PROCEDURE — 36620 INSERTION CATHETER ARTERY: CPT | Mod: 59,,, | Performed by: ANESTHESIOLOGY

## 2017-09-07 PROCEDURE — 25000003 PHARM REV CODE 250: Performed by: NURSE PRACTITIONER

## 2017-09-07 PROCEDURE — D9220A PRA ANESTHESIA: Mod: ANES,,, | Performed by: ANESTHESIOLOGY

## 2017-09-07 PROCEDURE — 86901 BLOOD TYPING SEROLOGIC RH(D): CPT

## 2017-09-07 PROCEDURE — 37000009 HC ANESTHESIA EA ADD 15 MINS: Performed by: INTERNAL MEDICINE

## 2017-09-07 PROCEDURE — D9220A PRA ANESTHESIA: Mod: CRNA,,, | Performed by: NURSE ANESTHETIST, CERTIFIED REGISTERED

## 2017-09-07 PROCEDURE — 93656 COMPRE EP EVAL ABLTJ ATR FIB: CPT | Mod: 22,,, | Performed by: INTERNAL MEDICINE

## 2017-09-07 PROCEDURE — 86900 BLOOD TYPING SEROLOGIC ABO: CPT

## 2017-09-07 PROCEDURE — 93010 ELECTROCARDIOGRAM REPORT: CPT | Mod: ,,, | Performed by: INTERNAL MEDICINE

## 2017-09-07 PROCEDURE — 93613 INTRACARDIAC EPHYS 3D MAPG: CPT | Mod: ,,, | Performed by: INTERNAL MEDICINE

## 2017-09-07 PROCEDURE — 63600175 PHARM REV CODE 636 W HCPCS

## 2017-09-07 PROCEDURE — 93320 DOPPLER ECHO COMPLETE: CPT | Mod: 26,,, | Performed by: INTERNAL MEDICINE

## 2017-09-07 RX ORDER — MIDAZOLAM HYDROCHLORIDE 1 MG/ML
INJECTION, SOLUTION INTRAMUSCULAR; INTRAVENOUS
Status: DISCONTINUED | OUTPATIENT
Start: 2017-09-07 | End: 2017-09-07

## 2017-09-07 RX ORDER — ONDANSETRON 2 MG/ML
INJECTION INTRAMUSCULAR; INTRAVENOUS
Status: DISCONTINUED | OUTPATIENT
Start: 2017-09-07 | End: 2017-09-07

## 2017-09-07 RX ORDER — FUROSEMIDE 10 MG/ML
INJECTION INTRAMUSCULAR; INTRAVENOUS
Status: DISCONTINUED | OUTPATIENT
Start: 2017-09-07 | End: 2017-09-07

## 2017-09-07 RX ORDER — SUCCINYLCHOLINE CHLORIDE 20 MG/ML
INJECTION INTRAMUSCULAR; INTRAVENOUS
Status: DISCONTINUED | OUTPATIENT
Start: 2017-09-07 | End: 2017-09-07

## 2017-09-07 RX ORDER — AMIODARONE HYDROCHLORIDE 100 MG/1
100 TABLET ORAL DAILY
Status: DISCONTINUED | OUTPATIENT
Start: 2017-09-07 | End: 2017-09-08 | Stop reason: HOSPADM

## 2017-09-07 RX ORDER — HEPARIN SODIUM 1000 [USP'U]/ML
INJECTION, SOLUTION INTRAVENOUS; SUBCUTANEOUS
Status: DISCONTINUED | OUTPATIENT
Start: 2017-09-07 | End: 2017-09-07

## 2017-09-07 RX ORDER — ACETAMINOPHEN 500 MG
500 TABLET ORAL EVERY 6 HOURS PRN
Status: DISCONTINUED | OUTPATIENT
Start: 2017-09-07 | End: 2017-09-08 | Stop reason: HOSPADM

## 2017-09-07 RX ORDER — PROTAMINE SULFATE 10 MG/ML
INJECTION, SOLUTION INTRAVENOUS
Status: DISCONTINUED | OUTPATIENT
Start: 2017-09-07 | End: 2017-09-07

## 2017-09-07 RX ORDER — IBUPROFEN 400 MG/1
400 TABLET ORAL EVERY 6 HOURS PRN
Status: DISCONTINUED | OUTPATIENT
Start: 2017-09-07 | End: 2017-09-08 | Stop reason: HOSPADM

## 2017-09-07 RX ORDER — FENTANYL CITRATE 50 UG/ML
INJECTION, SOLUTION INTRAMUSCULAR; INTRAVENOUS
Status: DISCONTINUED | OUTPATIENT
Start: 2017-09-07 | End: 2017-09-07

## 2017-09-07 RX ORDER — METOPROLOL TARTRATE 50 MG/1
50 TABLET ORAL 2 TIMES DAILY
Status: DISCONTINUED | OUTPATIENT
Start: 2017-09-07 | End: 2017-09-08 | Stop reason: HOSPADM

## 2017-09-07 RX ORDER — PROPOFOL 10 MG/ML
VIAL (ML) INTRAVENOUS
Status: DISCONTINUED | OUTPATIENT
Start: 2017-09-07 | End: 2017-09-07

## 2017-09-07 RX ORDER — VILAZODONE HYDROCHLORIDE 10 MG/1
40 TABLET ORAL DAILY
Status: DISCONTINUED | OUTPATIENT
Start: 2017-09-08 | End: 2017-09-08 | Stop reason: HOSPADM

## 2017-09-07 RX ORDER — SODIUM CHLORIDE 9 MG/ML
INJECTION, SOLUTION INTRAVENOUS CONTINUOUS PRN
Status: DISCONTINUED | OUTPATIENT
Start: 2017-09-07 | End: 2017-09-07

## 2017-09-07 RX ORDER — PANTOPRAZOLE SODIUM 40 MG/1
40 TABLET, DELAYED RELEASE ORAL DAILY
Status: DISCONTINUED | OUTPATIENT
Start: 2017-09-07 | End: 2017-09-08 | Stop reason: HOSPADM

## 2017-09-07 RX ORDER — LOSARTAN POTASSIUM 50 MG/1
100 TABLET ORAL DAILY
Status: DISCONTINUED | OUTPATIENT
Start: 2017-09-07 | End: 2017-09-08 | Stop reason: HOSPADM

## 2017-09-07 RX ORDER — ROSUVASTATIN CALCIUM 20 MG/1
40 TABLET, COATED ORAL NIGHTLY
Status: DISCONTINUED | OUTPATIENT
Start: 2017-09-07 | End: 2017-09-08 | Stop reason: HOSPADM

## 2017-09-07 RX ORDER — PREDNISONE 20 MG/1
20 TABLET ORAL NIGHTLY
COMMUNITY
End: 2017-12-21

## 2017-09-07 RX ORDER — HEPARIN SODIUM 10000 [USP'U]/100ML
INJECTION, SOLUTION INTRAVENOUS CONTINUOUS PRN
Status: DISCONTINUED | OUTPATIENT
Start: 2017-09-07 | End: 2017-09-07

## 2017-09-07 RX ORDER — PREDNISONE 20 MG/1
20 TABLET ORAL NIGHTLY
Status: DISCONTINUED | OUTPATIENT
Start: 2017-09-07 | End: 2017-09-08 | Stop reason: HOSPADM

## 2017-09-07 RX ORDER — GLYCOPYRROLATE 0.2 MG/ML
INJECTION INTRAMUSCULAR; INTRAVENOUS
Status: DISCONTINUED | OUTPATIENT
Start: 2017-09-07 | End: 2017-09-07

## 2017-09-07 RX ORDER — ROCURONIUM BROMIDE 10 MG/ML
INJECTION, SOLUTION INTRAVENOUS
Status: DISCONTINUED | OUTPATIENT
Start: 2017-09-07 | End: 2017-09-07

## 2017-09-07 RX ORDER — LIDOCAINE HCL/PF 100 MG/5ML
SYRINGE (ML) INTRAVENOUS
Status: DISCONTINUED | OUTPATIENT
Start: 2017-09-07 | End: 2017-09-07

## 2017-09-07 RX ADMIN — MIDAZOLAM 2 MG: 1 INJECTION INTRAMUSCULAR; INTRAVENOUS at 07:09

## 2017-09-07 RX ADMIN — PROTAMINE SULFATE 10 MG: 10 INJECTION, SOLUTION INTRAVENOUS at 11:09

## 2017-09-07 RX ADMIN — LIDOCAINE HYDROCHLORIDE 40 MG: 20 INJECTION, SOLUTION INTRAVENOUS at 07:09

## 2017-09-07 RX ADMIN — AMIODARONE HYDROCHLORIDE 100 MG: 100 TABLET ORAL at 05:09

## 2017-09-07 RX ADMIN — ONDANSETRON 4 MG: 2 INJECTION INTRAMUSCULAR; INTRAVENOUS at 11:09

## 2017-09-07 RX ADMIN — ROCURONIUM BROMIDE 5 MG: 10 INJECTION, SOLUTION INTRAVENOUS at 07:09

## 2017-09-07 RX ADMIN — HEPARIN SODIUM 8000 UNITS: 1000 INJECTION INTRAVENOUS; SUBCUTANEOUS at 09:09

## 2017-09-07 RX ADMIN — GLYCOPYRROLATE 0.2 MCG: 0.2 INJECTION, SOLUTION INTRAMUSCULAR; INTRAVENOUS at 08:09

## 2017-09-07 RX ADMIN — PANTOPRAZOLE SODIUM 40 MG: 40 TABLET, DELAYED RELEASE ORAL at 03:09

## 2017-09-07 RX ADMIN — ROSUVASTATIN CALCIUM 40 MG: 20 TABLET, FILM COATED ORAL at 08:09

## 2017-09-07 RX ADMIN — FENTANYL CITRATE 50 MCG: 50 INJECTION, SOLUTION INTRAMUSCULAR; INTRAVENOUS at 07:09

## 2017-09-07 RX ADMIN — PROPOFOL 200 MG: 10 INJECTION, EMULSION INTRAVENOUS at 07:09

## 2017-09-07 RX ADMIN — FUROSEMIDE 20 MG: 10 INJECTION, SOLUTION INTRAMUSCULAR; INTRAVENOUS at 12:09

## 2017-09-07 RX ADMIN — METOPROLOL TARTRATE 50 MG: 50 TABLET, FILM COATED ORAL at 08:09

## 2017-09-07 RX ADMIN — SODIUM CHLORIDE, SODIUM GLUCONATE, SODIUM ACETATE, POTASSIUM CHLORIDE, MAGNESIUM CHLORIDE, SODIUM PHOSPHATE, DIBASIC, AND POTASSIUM PHOSPHATE: .53; .5; .37; .037; .03; .012; .00082 INJECTION, SOLUTION INTRAVENOUS at 07:09

## 2017-09-07 RX ADMIN — LOSARTAN POTASSIUM 100 MG: 50 TABLET, FILM COATED ORAL at 08:09

## 2017-09-07 RX ADMIN — ACETAMINOPHEN 500 MG: 500 TABLET ORAL at 03:09

## 2017-09-07 RX ADMIN — RIVAROXABAN 20 MG: 20 TABLET, FILM COATED ORAL at 08:09

## 2017-09-07 RX ADMIN — PREDNISONE 20 MG: 20 TABLET ORAL at 08:09

## 2017-09-07 RX ADMIN — HEPARIN SODIUM AND DEXTROSE 4000 UNITS/HR: 10000; 5 INJECTION INTRAVENOUS at 09:09

## 2017-09-07 RX ADMIN — HEPARIN SODIUM 6000 UNITS: 1000 INJECTION INTRAVENOUS; SUBCUTANEOUS at 09:09

## 2017-09-07 RX ADMIN — EPHEDRINE SULFATE 5 MG: 50 INJECTION, SOLUTION INTRAMUSCULAR; INTRAVENOUS; SUBCUTANEOUS at 09:09

## 2017-09-07 RX ADMIN — SODIUM CHLORIDE: 0.9 INJECTION, SOLUTION INTRAVENOUS at 07:09

## 2017-09-07 RX ADMIN — SUCCINYLCHOLINE CHLORIDE 120 MG: 20 INJECTION, SOLUTION INTRAMUSCULAR; INTRAVENOUS at 07:09

## 2017-09-07 NOTE — NURSING
Sutures removed from left groin.  Gauze dressing applied and tegaderm.  Sutures removed from right groin.  Bleeding noted.  Covered with gauze dressing and pressure applied using manual pressure.  Pressure applied for 15 minutes.  Dr. Mccrary called to bedside.  Bleeding stopped.  No hematoma noted to right groin.  Gauze dressing and tegaderm applied.  Dr. Mccrary stated to ambulate patient at 2000.

## 2017-09-07 NOTE — NURSING TRANSFER
Nursing Transfer Note      9/7/2017     Transfer To: 318    Transfer via stretcher    Transfer with cardiac monitoring    Transported by rn    Medicines sent: none    Chart send with patient: Yes    Notified: nurse    Patient reassessed at: see epic (date, time)

## 2017-09-07 NOTE — CONSULTS
TRANSESOPHAGEAL ECHOCARDIOGRAPHY   PRE-PROCEDURE NOTE    09/07/2017    HPI:     Ayla Dela Cruz is a 72 y.o. woman with PMHx of HTN, HLD, TIA in 2014, whose history of arrhythmias dates back to her 30s when she began to experience sudden onset palpitations. Patient is here today for FELICIA+PVI.    Dysphagia or odynophagia:  No  Liver Disease, esophageal disease, or known varices:  No  Upper GI Bleeding: No  Snoring:  No  Sleep Apnea:  No  Prior neck surgery or radiation:  No  History of anesthetic difficulties:  No  Family history of anesthetic difficulties:  No  Last oral intake:  8  Able to move neck in all directions:  Yes      Meds:     Scheduled Meds:  PRN Meds:  Continuous Infusions:    Physical Exam:     Vitals:          Constitutional: NAD, conversant  HEENT:   Sclera anicteric, Uvula midline, EOMI, OP clear  Neck:               No JVD, moves to all direction without any limitations  CV:               RRR, no murmurs / rubs / gallops, normal S1/S2  Pulm:               CTAB, no wheezes, rales, or ronchi  GI:               Abdomen soft, NTND, +BS  Extremities:              No LE edema, warm with palpable pulses  Neuro:   AAOX3, no focal motor deficits    Mallampati:II  ASA:II      Labs:     Recent Results (from the past 336 hour(s))   CBC auto differential    Collection Time: 09/05/17  9:55 AM   Result Value Ref Range    WBC 13.95 (H) 3.90 - 12.70 K/uL    Hemoglobin 12.4 12.0 - 16.0 g/dL    Hematocrit 39.7 37.0 - 48.5 %    Platelets 308 150 - 350 K/uL       Recent Results (from the past 336 hour(s))   Basic metabolic panel    Collection Time: 09/05/17  9:55 AM   Result Value Ref Range    Sodium 140 136 - 145 mmol/L    Potassium 3.6 3.5 - 5.1 mmol/L    Chloride 106 95 - 110 mmol/L    CO2 24 23 - 29 mmol/L    BUN, Bld 22 8 - 23 mg/dL    Creatinine 0.9 0.5 - 1.4 mg/dL    Calcium 9.2 8.7 - 10.5 mg/dL    Anion Gap 10 8 - 16 mmol/L       CrCl cannot be calculated (Unknown ideal weight.).    INR:  1.1  Imaging:      Assessment & Plan:     PLAN:  1. FELICIA for evaluation of STACEY thrombus.    -The risks, benefits & alternatives of the procedure were explained to the patient.    -The risks of transesophageal echo include but are not limited to:  Dental trauma, esophageal trauma/perforation, bleeding, laryngospasm/brochospasm, aspiration, sore throat/hoarseness, & dislodgement of the endotracheal tube/nasogastric tube (where applicable).    -The risks of moderate sedation include hypotension, respiratory depression, arrhythmias, bronchospasm, & death.    -Informed consent was obtained & the patient is agreeable to proceed with the procedure.    I will discuss with the attending physician. Attending addendum is to follow.      Attending addendum to follow     Lizbeth Avila MD  Cardiology Fellow  Pager: [phone]

## 2017-09-07 NOTE — H&P
[facility]  Cardiology Electrophysiology  History and Physical     Patient Name: Ayla Dela Cruz  MRN: [id number]  Admission Date: 9/7/2017  Attending Provider: Fco Calderon MD  Principal Problem: <principal problem not specified>    Patient information was obtained from patient and ER records.     Subjective:     Chief Complaint:  Ayla Dela Cruz 72 y.o. with HTN, HLD, TIA in 2014, who is here today for FELICIA and PVI due to  atrial arrhythmias.       HPI: 72 y.o. female referred to Arrhythmia service for evaluation of atrial arrhythmias, whose history of arrhythmias dates back to her 30s when she began to experience sudden onset palpitations. She was started on Tambacor at that time, which improved her symptoms. She was started on Coumadin at age 55 years. In the mid-1990s, she underwent an ablation for what sounds like atrial flutter in [place], [place]. In 1997 she underwent a second ablation which she was told was unsuccessful. Since that time she has undergone two electrical cardioversions, once in the mid-2000s (which lasted 5 years), and another around 2010. Around 2014 her arrhythmias recurred around the time she had her TIA. She was started on Xarelto and restarted on Tambacor at that time. Notably, on 100 mg bid she was having AF recurrences and SOB. The dose was lowered to 50 mg bid and eventually stopped. She is currently only on Metoprolol 50 mg BID. Last occurrence of palpitations, SOB, weakness was in June 2017 prior to most recent adjustment of her medications.  She also just started taking prednisone 20 mg due to itching.       Recent cardiac studies include an echo from 10/2016 which showed an EF of 65-70%, normal LA size, and a PASP of 64 mmHg (EF 40% in 11/2014).     I reviewed all ECGs in the EMR. ECGs from 9038-8123 show sinus rhythm. ECGs from 1/2014 and 4/2014 show AF. ECGs between 6/2014 and 1/2016 show sinus bradycardia and NSR. All ECGs between 1/16/2016  and 5/2017 show either AF or AFL. When in AFL, RVR is generally seen.     Past Medical History:   Diagnosis Date    Anxiety     Arthritis     Atrial fibrillation     Cancer     skin    Depression     DVT (deep venous thrombosis)     GERD (gastroesophageal reflux disease)     Glaucoma (increased eye pressure)     Hyperlipidemia     diet controlled    Hypertension     Interstitial lung disease     Pneumonia 1/31/2014    Stroke 6-3-14    Stroke     TIA (transient ischemic attack)     TIA (transient ischemic attack)        Past Surgical History:   Procedure Laterality Date    2 heart ablations      bilateral cataracts      CHOLECYSTECTOMY      COLONOSCOPY N/A 1/19/2017    Procedure: COLONOSCOPY and EGD;  Surgeon: Jonathan Schultz MD;  Location[facility];  Service: Endoscopy;  Laterality: N/A;    pyloristenosis      skin cancer removal       TONSILLECTOMY            Review of patient's allergies indicates:   Allergen Reactions    Atorvastatin      Other reaction(s): Joint pain    Augmentin [amoxicillin-pot clavulanate]      Other reaction(s): Mental Status Change    Baclofen (bulk) Nausea And Vomiting    Ciprofloxacin (bulk)     Decongest tabs      Other reaction(s): increased heart rate    Erythromycin Other (See Comments)    Flecainide Hives     And SOB    Fluoxetine      Other reaction(s): heart palpitations  Other reaction(s): anxiety    Lisinopril Other (See Comments)     cough    Morphine Other (See Comments)     confusion    Venlafaxine analogues      Changes in BP and increases heart rate       Afrin [oxymetazoline] Palpitations    Caffeine Palpitations        No current facility-administered medications on file prior to encounter.      Current Outpatient Prescriptions on File Prior to Encounter   Medication Sig Dispense Refill    ammonium lactate (LAC-HYDRIN) 12 % lotion       clobetasol (TEMOVATE) 0.05 % cream APPLY TWICE DAILY TO RASH UP TO 2 WEEKS/MONTH AS NEEDED.  3     dorzolamide-timolol (COSOPT) 2-0.5 % ophthalmic solution Place 1 drop into both eyes 2 (two) times daily. Twice a day      esomeprazole (NEXIUM) 40 MG capsule TAKE 1 CAPSULE (40 MG TOTAL) BY MOUTH ONCE DAILY. EVERY DAY 90 capsule 3    lorazepam (ATIVAN) 1 MG tablet TAKE 1 TABLET BY MOUTH DAILY 30 tablet 3    losartan (COZAAR) 100 MG tablet TAKE 1 TABLET (100 MG TOTAL) BY MOUTH ONCE DAILY. 90 tablet 2    metoprolol tartrate (LOPRESSOR) 100 MG tablet Take 1 tablet (100 mg total) by mouth 2 (two) times daily. (Patient taking differently: Take 50 mg by mouth 2 (two) times daily. ) 120 tablet 5    polyethylene glycol (GLYCOLAX) 17 gram PwPk Take 17 g by mouth as needed.      rosuvastatin (CRESTOR) 40 MG Tab TAKE 1 TABLET (40 MG TOTAL) BY MOUTH EVERY EVENING. 90 tablet 3    salsalate (DISALCID) 500 MG Tab TAKE 2 TABLETS BY MOUTH 3 TIMES A DAY AFTER MEALS AS NEEDED  3    VIIBRYD 40 mg Tab tablet Take 40 mg by mouth once daily.  2    XARELTO 20 mg Tab TAKE 1 TABLET (20 MG TOTAL) BY MOUTH DAILY WITH DINNER OR EVENING MEAL. 30 tablet 6       Family History   Problem Relation Age of Onset    Heart disease Father     Ulcers Father     Arthritis Mother     Asthma Mother     Rheum arthritis Mother     Pneumonia Mother     Depression Son     Alzheimer's disease Maternal Uncle     Rheum arthritis Maternal Grandmother     Emphysema Maternal Grandfather     Cancer Maternal Grandfather      kidney    Kidney disease Maternal Grandfather     Cancer Paternal Grandmother      lung= smoker    Pneumonia Paternal Grandfather     Breast cancer Neg Hx     Ovarian cancer Neg Hx        Social History   Substance Use Topics    Smoking status: Never Smoker    Smokeless tobacco: Never Used    Alcohol use No         Review of Systems   Constitution: Negative for fevers/chills. Negative for easily fatigues.   HENT: Negative for ear pain and tinnitus.   Eyes: Negative for blurred vision.   Cardiovascular: Positive for  palpitations.Last episode in June 2017.  Negative for chest pain,  near-syncope, and syncope.   Respiratory:  Positive for shortness of breath.  Last episode in June 2017.  Endocrine:  Negative for polyuria.   Hematologic/Lymphatic: Negative for bruise/bleed easily.   Skin: Positive  for rash and itching.   Musculoskeletal: Negative for joint pain and muscle weakness.   Extremity: Negative for swelling in the lower extremities.   Gastrointestinal:  Negative for abdominal pain and change in bowel habit.   Genitourinary: Negative for frequency.   Neurological:  Negative for dizziness.   Psychiatric/Behavioral:  Negative for depression, anxiety     Objective:          Vital Signs (24h Range):      Vitals:    09/07/17 0706   BP: (!) 183/86   Pulse:    Resp:    Temp:          PE:   General: Well developed, well nourished, No distress on room air   HEENT: Head is normocephalic, atraumatic;  Lungs: Clear to auscultation bilaterally.  No wheezing. Normal respiratory effort.  Cardiovascular:  S1 S2 Normal rate, regular rhythm and normal heart sounds.    PMI is not displaced  Extremities: No LE edema. Pulses 2+ and symmetric.   Abdomen: Abdomen is soft, non-tender non-distended with normal bowel sounds.  Skin: Skin color, texture, turgor normal. No rashes.  Musculoskeletal: normal range of motion   Neurologic: Normal strength and tone. No focal numbness or weakness.   Psychiatric: normal mood and affect.  behavior is normal. Alert and oriented X 3.      Significant Labs:   Lab Results   Component Value Date    WBC 13.95 (H) 09/05/2017    HGB 12.4 09/05/2017    HCT 39.7 09/05/2017    MCV 91 09/05/2017     09/05/2017     BMP  Lab Results   Component Value Date     09/05/2017    K 3.6 09/05/2017     09/05/2017    CO2 24 09/05/2017    BUN 22 09/05/2017    CREATININE 0.9 09/05/2017    CALCIUM 9.2 09/05/2017    ANIONGAP 10 09/05/2017    ESTGFRAFRICA >60.0 09/05/2017    EGFRNONAA >60.0 09/05/2017      Lab Results    Component Value Date    INR 1.1 2017    INR 1.1 2017    INR 1.7 (H) 2014       Significant Imaging: reviewed    EK2017 Pending    Assessment and Plan:     In summary, Ayla Dela Cruz is a 72 year old female with a longstanding history of atrial arrhythmias    PVI   FELICIA prior to rule out thrombus.  Sedation per anesthesia.        Prior to procedure, extensive discussion with patient regarding risks and benefits of  ablation, and patient  would like to proceed.  The patient voices understanding and all questions have been answered. No further questions/concerns voiced at this time.      Signed:  NORTH Lopez, APRN, FNP-BC  Nurse Practitioner  Cardiology- EP        Attending: Dr. JONY Calderon

## 2017-09-07 NOTE — ANESTHESIA POSTPROCEDURE EVALUATION
"Anesthesia Post Evaluation    Patient: Ayla Dela Cruz    Procedure(s) Performed: Procedure(s) (LRB):  ABLATION (N/A)  TRANSESOPHAGEAL ECHOCARDIOGRAM (FELICIA) (N/A)    Final Anesthesia Type: general  Patient location during evaluation: PACU  Patient participation: Yes- Able to Participate  Level of consciousness: awake and alert and awake  Post-procedure vital signs: reviewed and stable  Pain management: adequate  Airway patency: patent  PONV status at discharge: No PONV  Anesthetic complications: no      Cardiovascular status: blood pressure returned to baseline  Respiratory status: unassisted and spontaneous ventilation  Hydration status: euvolemic  Follow-up not needed.        Visit Vitals  BP (!) 152/69 (BP Location: Left arm, Patient Position: Lying)   Pulse 62   Temp 36.6 °C (97.8 °F) (Oral)   Resp 16   Ht 5' 7" (1.702 m)   Wt 62.6 kg (138 lb)   SpO2 100%   Breastfeeding? No   BMI 21.61 kg/m²       Pain/Chalino Score: Pain Assessment Performed: Yes (9/7/2017  1:30 PM)  Presence of Pain: denies (9/7/2017  1:30 PM)  Pain Rating Prior to Med Admin: 0 (9/7/2017  1:30 PM)  Chalino Score: 10 (9/7/2017  1:30 PM)      "

## 2017-09-07 NOTE — ANESTHESIA RELEASE NOTE
"Anesthesia Release from PACU Note    Patient: Ayla Dela Cruz    Procedure(s) Performed: Procedure(s) (LRB):  ABLATION (N/A)  TRANSESOPHAGEAL ECHOCARDIOGRAM (FELICIA) (N/A)    Anesthesia type: general    Post pain: Adequate analgesia    Post assessment: no apparent anesthetic complications, tolerated procedure well and no evidence of recall    Last Vitals:   Visit Vitals  BP (!) 152/69 (BP Location: Left arm, Patient Position: Lying)   Pulse 62   Temp 36.6 °C (97.8 °F) (Oral)   Resp 16   Ht 5' 7" (1.702 m)   Wt 62.6 kg (138 lb)   SpO2 100%   Breastfeeding? No   BMI 21.61 kg/m²       Post vital signs: stable    Level of consciousness: awake and alert     Nausea/Vomiting: no nausea/no vomiting    Complications: none    Airway Patency: patent    Respiratory: unassisted, spontaneous ventilation    Cardiovascular: stable and blood pressure at baseline    Hydration: euvolemic  "

## 2017-09-07 NOTE — PROGRESS NOTES
Pt is AAOx4 and denies pain.  VSS.  Admit questions completed.  IV access obtained.  Lucia DONIS obtained bedside report.  See full assessment for details.  Will continue to monitor.

## 2017-09-07 NOTE — PROGRESS NOTES
Report received from neptali rodriguez rn care assumed see epic for complete assessment pacu bcg's maintained safety measures verifed patient instructed on pain scale and patient verbalized understanding. Also called for ekg it was done and placed in patient's chart also updated patient's  on patient location with the permission of patient. Upon assessment right groin oozing blood light pressure held and rayna mccauley notified fab li charge light pressure held for 10mins. fab mccauley let Dr. Calderon know no hemotoma noted.

## 2017-09-07 NOTE — ANESTHESIA PROCEDURE NOTES
Arterial    Diagnosis: paroxysmal A fibrillation    Patient location during procedure: done in OR  Procedure start time: 9/7/2017 7:51 AM  Timeout: 9/7/2017 7:50 AM  Procedure end time: 9/7/2017 7:55 AM  Staffing  Anesthesiologist: JULIA MORALEZ  Performed: anesthesiologist   Preanesthetic Checklist  Completed: patient identified, site marked, surgical consent, pre-op evaluation, timeout performed, IV checked, risks and benefits discussed, monitors and equipment checked and anesthesia consent givenArterial  Skin Prep: chlorhexidine gluconate  Local Infiltration: none  Orientation: right  Location: radial  Catheter Size: 20 G  Catheter placement by Anatomical landmarks. Heme positive aspiration all ports.Insertion Attempts: 1  Assessment  Dressing: secured with tape and tegaderm  Patient: Tolerated well

## 2017-09-07 NOTE — NURSING
Pt received via stretcher from cath lab procedure.  Awake alert and oriented.  Moralez.  No c/o pain or SOB.  Bilateral sutures to groins on each side.  No drainage noted to suture sites of groins.  VSS.  Pt instructed to remain supine and flat in bed.  Family at bedside.  Will continue to monitor.

## 2017-09-07 NOTE — TRANSFER OF CARE
"Anesthesia Transfer of Care Note    Patient: Ayla Dela Cruz    Procedure(s) Performed: Procedure(s) (LRB):  ABLATION (N/A)  TRANSESOPHAGEAL ECHOCARDIOGRAM (FELICIA) (N/A)    Patient location: Other: SSCU pacu    Anesthesia Type: general    Transport from OR: Transported from OR on 6-10 L/min O2 by face mask with adequate spontaneous ventilation    Post pain: adequate analgesia    Post assessment: tolerated procedure well and no apparent anesthetic complications    Post vital signs: stable    Level of consciousness: awake and alert    Nausea/Vomiting: no nausea/vomiting    Complications: none    Transfer of care protocol was followed      Last vitals:   Visit Vitals  BP (!) 183/86 (BP Location: Left arm, Patient Position: Lying)   Pulse 68   Temp 36.6 °C (97.9 °F) (Oral)   Resp 16   Ht 5' 7" (1.702 m)   Wt 62.6 kg (138 lb)   SpO2 100%   Breastfeeding? No   BMI 21.61 kg/m²     "

## 2017-09-07 NOTE — ANESTHESIA PREPROCEDURE EVALUATION
09/07/2017  Ayla Dela Cruz is a 72 y.o., female.    Anesthesia Evaluation    I have reviewed the Patient Summary Reports.    I have reviewed the Nursing Notes.   I have reviewed the Medications.     Review of Systems  Anesthesia Hx:  No problems with previous Anesthesia    Social:  Non-Smoker, No Alcohol Use    Hematology/Oncology:  Hematology Normal   Oncology Normal     EENT/Dental:EENT/Dental Normal   Cardiovascular:   Hypertension, well controlled AVILA NYHA Classification I ECHo:    CONCLUSIONS     1 - Hyperdynamic left ventricular systolic function (EF 65-70%).     2 - Normal left ventricular diastolic function.     3 - Normal right ventricular systolic function .     4 - Pulmonary hypertension. The estimated PA systolic pressure is 64 mmHg.     5 - Less evidence for LVH from Echo in 11/2014.    Pulmonary:   Pneumonia Shortness of breath    Renal/:  Renal/ Normal     Hepatic/GI:  Hepatic/GI Normal    Musculoskeletal:   Arthritis     Neurological:   TIA, CVA, no residual symptoms    Endocrine:  Endocrine Normal    Dermatological:  Skin Normal    Psych:   Psychiatric History          Physical Exam  General:  Well nourished    Airway/Jaw/Neck:  Airway Findings: Mouth Opening: Normal Tongue: Normal  General Airway Assessment: Adult  Mallampati: I  Improves to I with phonation.  TM Distance: Normal, at least 6 cm         Dental:  DENTAL FINDINGS: Normal   Chest/Lungs:  Chest/Lungs Findings: Clear to auscultation, Normal Respiratory Rate     Heart/Vascular:  Heart Findings: Rate: Normal  Rhythm: Regular Rhythm  Sounds: Normal     Abdomen:  Abdomen Findings:  Normal, Nontender, Soft     Musculoskeletal:  Musculoskeletal Findings: Normal   Skin:  Skin Findings: Normal    Mental Status:  Mental Status Findings:  Cooperative, Alert and Oriented         Anesthesia Plan  Type of Anesthesia, risks &  benefits discussed:  Anesthesia Type:  general  Patient's Preference:   Intra-op Monitoring Plan: standard ASA monitors and arterial line  Intra-op Monitoring Plan Comments:   Post Op Pain Control Plan: per primary service following discharge from PACU  Post Op Pain Control Plan Comments:   Induction:   IV  Beta Blocker:  Patient is on a Beta-Blocker and has received one dose within the past 24 hours (No further documentation required).       Informed Consent: Patient understands risks and agrees with Anesthesia plan.  Questions answered. Anesthesia consent signed with patient.  ASA Score: 3     Day of Surgery Review of History & Physical:    H&P update referred to the surgeon.     Anesthesia Plan Notes: KARINA Beck  2nd IV        Ready For Surgery From Anesthesia Perspective.

## 2017-09-08 VITALS
DIASTOLIC BLOOD PRESSURE: 72 MMHG | SYSTOLIC BLOOD PRESSURE: 133 MMHG | BODY MASS INDEX: 21.66 KG/M2 | WEIGHT: 138 LBS | HEIGHT: 67 IN | RESPIRATION RATE: 18 BRPM | HEART RATE: 62 BPM | TEMPERATURE: 97 F | OXYGEN SATURATION: 95 %

## 2017-09-08 PROCEDURE — 93010 ELECTROCARDIOGRAM REPORT: CPT | Mod: ,,, | Performed by: INTERNAL MEDICINE

## 2017-09-08 PROCEDURE — 93005 ELECTROCARDIOGRAM TRACING: CPT

## 2017-09-08 RX ORDER — AMIODARONE HYDROCHLORIDE 100 MG/1
100 TABLET ORAL DAILY
Qty: 30 TABLET | Refills: 3 | Status: SHIPPED | OUTPATIENT
Start: 2017-09-08 | End: 2017-11-01

## 2017-09-08 RX ORDER — FUROSEMIDE 20 MG/1
20 TABLET ORAL DAILY PRN
Qty: 10 TABLET | Refills: 0 | Status: SHIPPED | OUTPATIENT
Start: 2017-09-08 | End: 2017-11-01 | Stop reason: SDUPTHER

## 2017-09-08 RX ORDER — IBUPROFEN 400 MG/1
400 TABLET ORAL EVERY 6 HOURS PRN
Qty: 10 TABLET | Refills: 0 | Status: SHIPPED | OUTPATIENT
Start: 2017-09-08 | End: 2017-11-07

## 2017-09-08 RX ORDER — FUROSEMIDE 20 MG/1
20 TABLET ORAL 2 TIMES DAILY
Qty: 60 TABLET | Refills: 11 | Status: SHIPPED | OUTPATIENT
Start: 2017-09-08 | End: 2017-09-08

## 2017-09-08 RX ORDER — PANTOPRAZOLE SODIUM 40 MG/1
40 TABLET, DELAYED RELEASE ORAL DAILY
Qty: 30 TABLET | Refills: 2 | Status: SHIPPED | OUTPATIENT
Start: 2017-09-08 | End: 2017-11-01 | Stop reason: SDUPTHER

## 2017-09-08 RX ORDER — FUROSEMIDE 20 MG/1
20 TABLET ORAL DAILY PRN
Qty: 10 TABLET | Refills: 0 | Status: SHIPPED | OUTPATIENT
Start: 2017-09-08 | End: 2017-09-08

## 2017-09-08 NOTE — PLAN OF CARE
Problem: Patient Care Overview  Goal: Plan of Care Review  Outcome: Ongoing (interventions implemented as appropriate)  Plan of care discussed with patient. All questions answered. bilat groin sites cdi, soft. Patient reports tightness to R thigh, no other complaints. Plan for dc home this am, call light in reach. Family at bedside. Will monitor.

## 2017-09-08 NOTE — NURSING
Patient complained of rt thigh pain, denies need for additional pain meds.  Ice pack given.  Will continue to monitor.

## 2017-09-08 NOTE — PROGRESS NOTES
Patient s/p  PVI on 9/7/17 . Pt Ambulating, No acute issues overnight. Pt seen by MD Calderon this morning.  Resumed  xarelto 20 mg last night. Started amio last night. Complains of slight right thigh soreness.       Physical Exam:   Gen: no acute distress, pleasant patient answering questions appropriately  CVS: regular rate and rhythm, S1S2 normal, no murmurs/rubs/gallops  CHEST: clear to auscultation bilaterally, no wheezes/rales/ronchi  ABD: Soft, non-tender, nondistended, bowel sounds present  GROINS: bilateral  groin  Soft, non tender, no bleeding no hematoma   EXT: No Edema  NEURO: awake, alert, and oriented      Dispo: discharge today    Continue Xarelto 20 mg daily   Continue Amiodarone 100 mg daily   Protonix 40 mg daily x 30 days   Lasix 20 mg daily x 10 days   Motrin as needed   F/u in 6 weeks with Dr. Yumiko Gao, [place], APRN, FNP-BC  Nurse Practitioner  Cardiology- EP

## 2017-09-08 NOTE — DISCHARGE SUMMARY
[facility]  Cardiac Electrophysiology  Discharge Summary      Patient Name: Ayla Dela Cruz  MRN: [id number]  Admission Date: 9/7/2017  Hospital Length of Stay: 0 days  Discharge Date and Time: 9/8/2017  9:08 AM  Attending Physician: Fco Calderon MD  Discharging Provider: Violet Gao NP  Primary Care Physician: Jan Olivo MD    HPI: Ayla Dela Cruz 72 y.o.  female with a longstanding history of atrial arrhythmias who presents for FELICIA and  PVI.      Procedure(s) (LRB):  ABLATION (N/A)  TRANSESOPHAGEAL ECHOCARDIOGRAM (FELICIA) (N/A)     Hospital Course: Ayla Dela Cruz  72 y.o. female with a longstanding history of atrial arrhythmias.She had failed Flecainide in the past.  Options available to her including antiarrhythmics and catheter ablation were discussed and after considering her options, she decided to proceed with PVI ( see procedure note for details).  She tolerated procedure,  with no acute complications. Monitored overnight, bilateral groins with no bleeding or hematoma > ambulating  with no issues. Pt resumed xarelto last night and will continue taking daily. Amiodarone 100 mg po started last night. Minimal bleeding to groin x 1 episode last night which resolved with manual pressure.  Pt to follow up with JONY Calderon MD in 6 weeks.  Discharge plans/instructions discussed with patient who verbalized understanding  and agreement of plans of care.  No further questions or concerns  voiced at this time     Consults: Anesthesia      Final Active Diagnoses:    Diagnosis Date Noted POA    PRINCIPAL PROBLEM:  A-fib [I48.91] 09/07/2017 Yes      Problems Resolved During this Admission:    Diagnosis Date Noted Date Resolved POA         Discharged Condition: good    Disposition: Home or Self Care    Follow Up:  Follow-up Information     Fco Calderon MD In 6 weeks.    Specialties:  Electrophysiology, Cardiovascular Disease  Why:  Post RFA f/u  Contact information:  [id number]  [facility] [id number]  [phone]                 Patient Instructions:     Diet general     Lifting restrictions   Scheduling Instructions: No lifting more than 5-10 pounds x 1 week     Shower on day dressing removed (No bath)   Scheduling Instructions: May shower today; No tub baths until puncture sites have completely healed     Call MD for:  temperature >100.4     Call MD for:  persistent nausea and vomiting or diarrhea     Call MD for:  severe uncontrolled pain     Call MD for:  redness, tenderness, or signs of infection (pain, swelling, redness, odor or green/yellow discharge around incision site)     Call MD for:  difficulty breathing or increased cough     Call MD for:  severe persistent headache     Call MD for:  worsening rash     Call MD for:  persistent dizziness, light-headedness, or visual disturbances     Call MD for:  increased confusion or weakness     Call MD for:   Scheduling Instructions: Any medical concerns regarding procedure     No dressing needed       Medications:  Reconciled Home Medications:   Discharge Medication List as of 9/8/2017  8:19 AM      START taking these medications    Details   amiodarone (PACERONE) 100 MG Tab Take 1 tablet (100 mg total) by mouth once daily., Starting Fri 9/8/2017, Until Sat 9/8/2018, Normal      ibuprofen (ADVIL,MOTRIN) 400 MG tablet Take 1 tablet (400 mg total) by mouth every 6 (six) hours as needed for Other (pain)., Starting Fri 9/8/2017, Normal      pantoprazole (PROTONIX) 40 MG tablet Take 1 tablet (40 mg total) by mouth once daily., Starting Fri 9/8/2017, Until Sat 9/8/2018, Normal      furosemide (LASIX) 20 MG tablet Take 1 tablet (20 mg total) by mouth  PRN Daily for weight gain/ shortness of breath ., Starting Fri 9/8/2017, Until Sat 9/8/2018, Normal         CONTINUE these medications which have NOT CHANGED    Details   ammonium lactate (LAC-HYDRIN) 12 % lotion Starting 8/7/2015, Until Discontinued, Historical Med      clobetasol  (TEMOVATE) 0.05 % cream APPLY TWICE DAILY TO RASH UP TO 2 WEEKS/MONTH AS NEEDED., Historical Med      dorzolamide-timolol (COSOPT) 2-0.5 % ophthalmic solution Place 1 drop into both eyes 2 (two) times daily. Twice a day, Starting 10/20/2011, Until Discontinued, Historical Med      lorazepam (ATIVAN) 1 MG tablet TAKE 1 TABLET BY MOUTH DAILY, Normal      losartan (COZAAR) 100 MG tablet TAKE 1 TABLET (100 MG TOTAL) BY MOUTH ONCE DAILY., Normal      metoprolol tartrate (LOPRESSOR) 100 MG tablet Take 1 tablet (100 mg total) by mouth 2 (two) times daily., Starting Mon 5/29/2017, Normal      predniSONE (DELTASONE) 20 MG tablet Take 20 mg by mouth every evening., Historical Med      rosuvastatin (CRESTOR) 40 MG Tab TAKE 1 TABLET (40 MG TOTAL) BY MOUTH EVERY EVENING., Starting Sat 6/10/2017, Until Sun 6/10/2018, Normal      salsalate (DISALCID) 500 MG Tab TAKE 2 TABLETS BY MOUTH 3 TIMES A DAY AFTER MEALS AS NEEDED, Historical Med      VIIBRYD 40 mg Tab tablet Take 40 mg by mouth once daily., Starting 12/19/2016, Until Discontinued, Historical Med      polyethylene glycol (GLYCOLAX) 17 gram PwPk Take 17 g by mouth as needed., Until Discontinued, Historical Med      XARELTO 20 mg Tab TAKE 1 TABLET (20 MG TOTAL) BY MOUTH DAILY WITH DINNER OR EVENING MEAL., Normal         STOP taking these medications       esomeprazole (NEXIUM) 40 MG capsule Comments:   Reason for Stopping:               Violet Gao NP  Cardiac Electrophysiology  [facility]    Attending: JONY Calderon MD

## 2017-09-13 ENCOUNTER — OFFICE VISIT (OUTPATIENT)
Dept: RHEUMATOLOGY | Facility: CLINIC | Age: 72
End: 2017-09-13
Payer: MEDICARE

## 2017-09-13 VITALS
BODY MASS INDEX: 22.6 KG/M2 | SYSTOLIC BLOOD PRESSURE: 96 MMHG | HEIGHT: 67 IN | DIASTOLIC BLOOD PRESSURE: 64 MMHG | WEIGHT: 144 LBS

## 2017-09-13 DIAGNOSIS — M15.9 OSTEOARTHRITIS, GENERALIZED: Primary | ICD-10-CM

## 2017-09-13 PROCEDURE — 99213 OFFICE O/P EST LOW 20 MIN: CPT | Mod: ,,, | Performed by: INTERNAL MEDICINE

## 2017-09-13 PROCEDURE — 3074F SYST BP LT 130 MM HG: CPT | Mod: ,,, | Performed by: INTERNAL MEDICINE

## 2017-09-13 PROCEDURE — 3078F DIAST BP <80 MM HG: CPT | Mod: ,,, | Performed by: INTERNAL MEDICINE

## 2017-09-13 PROCEDURE — 1159F MED LIST DOCD IN RCRD: CPT | Mod: ,,, | Performed by: INTERNAL MEDICINE

## 2017-09-13 RX ORDER — DIFLUNISAL 500 MG/1
500 TABLET, FILM COATED ORAL 2 TIMES DAILY
Qty: 90 TABLET | Refills: 1 | Status: SHIPPED | OUTPATIENT
Start: 2017-09-13 | End: 2018-01-17

## 2017-09-13 NOTE — PROGRESS NOTES
[facility] RHEUMATOLOGY            PROGRESS NOTE      Subjective:       Patient ID:   NAME: Ayla Dela Cruz : 1945     72 y.o. female    Referring Doc: No ref. provider found  Other Physicians:    Chief Complaint:  Osteoarthritis      History of Present Illness:     Patient returns today for a regularly scheduled follow-up visit forOsteoarthritis     The patient is doing well. No joint complaints. She has been on prednisone for presumably allergic skin eruption.  She feels the prednisone has controlled her joint pains.            ROS:   GEN:  No  fever, night sweats . weight is stable   No fatigue  SKIN: no rashes, no bruising, no ulcerations, no Raynaud's  HEENT: no HA's, No visual changes, no mucosal ulcers, no sicca symptoms,  CV:   no CP, SOB, PND, AVILA, no orthopnea, no palpitations  PULM: normal with no SOB, cough, hemoptysis, sputum or pleuritic pain  GI:  no abdominal pain, nausea, vomiting, constipation, diarrhea, melanotic stools, BRBPR, hematemesis, no dysphagia  :   no dysuria  NEURO: no paresthesias, headaches, visual disturbances, muscle weakness  MUSCULOSKELETAL:no joint swelling, prolonged AM stiffness, no back pain, no muscle pain  Allergies:  Review of patient's allergies indicates:   Allergen Reactions    Atorvastatin      Other reaction(s): Joint pain    Augmentin [amoxicillin-pot clavulanate]      Other reaction(s): Mental Status Change    Baclofen (bulk) Nausea And Vomiting    Ciprofloxacin (bulk)     Decongest tabs      Other reaction(s): increased heart rate    Erythromycin Other (See Comments)    Flecainide Hives     And SOB    Fluoxetine      Other reaction(s): heart palpitations  Other reaction(s): anxiety    Lisinopril Other (See Comments)     cough    Morphine Other (See Comments)     confusion    Venlafaxine analogues      Changes in BP and increases heart rate       Afrin [oxymetazoline] Palpitations    Caffeine Palpitations       Medications:    Current  Outpatient Prescriptions:     amiodarone (PACERONE) 100 MG Tab, Take 1 tablet (100 mg total) by mouth once daily., Disp: 30 tablet, Rfl: 3    ammonium lactate (LAC-HYDRIN) 12 % lotion, , Disp: , Rfl:     clobetasol (TEMOVATE) 0.05 % cream, APPLY TWICE DAILY TO RASH UP TO 2 WEEKS/MONTH AS NEEDED., Disp: , Rfl: 3    dorzolamide-timolol (COSOPT) 2-0.5 % ophthalmic solution, Place 1 drop into both eyes 2 (two) times daily. Twice a day, Disp: , Rfl:     furosemide (LASIX) 20 MG tablet, Take 1 tablet (20 mg total) by mouth daily as needed (Shortness of breath or Lower extremity swelling within first 10 days post procedure)., Disp: 10 tablet, Rfl: 0    ibuprofen (ADVIL,MOTRIN) 400 MG tablet, Take 1 tablet (400 mg total) by mouth every 6 (six) hours as needed for Other (pain)., Disp: 10 tablet, Rfl: 0    lorazepam (ATIVAN) 1 MG tablet, TAKE 1 TABLET BY MOUTH DAILY, Disp: 30 tablet, Rfl: 3    losartan (COZAAR) 100 MG tablet, TAKE 1 TABLET (100 MG TOTAL) BY MOUTH ONCE DAILY., Disp: 90 tablet, Rfl: 2    metoprolol tartrate (LOPRESSOR) 100 MG tablet, Take 1 tablet (100 mg total) by mouth 2 (two) times daily. (Patient taking differently: Take 50 mg by mouth 2 (two) times daily. ), Disp: 120 tablet, Rfl: 5    pantoprazole (PROTONIX) 40 MG tablet, Take 1 tablet (40 mg total) by mouth once daily., Disp: 30 tablet, Rfl: 2    polyethylene glycol (GLYCOLAX) 17 gram PwPk, Take 17 g by mouth as needed., Disp: , Rfl:     predniSONE (DELTASONE) 20 MG tablet, Take 20 mg by mouth every evening., Disp: , Rfl:     rosuvastatin (CRESTOR) 40 MG Tab, TAKE 1 TABLET (40 MG TOTAL) BY MOUTH EVERY EVENING., Disp: 90 tablet, Rfl: 3    salsalate (DISALCID) 500 MG Tab, TAKE 2 TABLETS BY MOUTH 3 TIMES A DAY AFTER MEALS AS NEEDED, Disp: , Rfl: 3    VIIBRYD 40 mg Tab tablet, Take 40 mg by mouth once daily., Disp: , Rfl: 2    XARELTO 20 mg Tab, TAKE 1 TABLET (20 MG TOTAL) BY MOUTH DAILY WITH DINNER OR EVENING MEAL., Disp: 30 tablet, Rfl:  "6    PMHx/PSHx Updates:      Objective:     Vitals:  Blood pressure 96/64, height 5' 7" (1.702 m), weight 65.3 kg (144 lb).    Physical Examination:   GEN: no apparent distress, comfortable; AAOx3  SKIN: no rashes,no ulceration, no Raynaud's, no petechiae, no SQ nodules,  HEAD: normal  EYES: no pallor, no icterus,   NECK: no masses, thyroid normal, trachea midline, no LAD/LN's, supple  CV: RRR with no murmur; l S1 and S2 reg. ,no gallop no rubs,   CHEST: Normal respiratory effort; CTAB; normal breath sounds; no wheeze or crackles  ABDOM: nontender and nondistended; soft; no masses; no rebound/guarding  MUSC/Skeletal: ROM normal; no crepitus; joints without synovitis,  no deformities  No joint swelling or tenderness of PIP, MCP, wrist, elbow, shoulder, or knee joints  EXTREM: no clubbing, cyanosis, no edema,normal  pulses   NEURO: grossly intact; motor WNL; AAOx3; ,  PSYCH: normal mood, affect and behavior  LYMPH: normal cervical, supraclavicular          Labs:   Lab Results   Component Value Date    WBC 13.95 (H) 09/05/2017    HGB 12.4 09/05/2017    HCT 39.7 09/05/2017    MCV 91 09/05/2017     09/05/2017    CMP  @LAST[facility](NA,K,CL,CO2,GLU,BUN,Creatinine,Calcium,PROT,Albumin,Bilitot,Alkphos,AST,ALT,CRP,ESR,RF,CCP,LAWRENCE,SSA,CPK,uric acid) )@  I have reviewed all available lab results and radiology reports.    Radiology/Diagnostic Studies:        Assessment/Plan:   (1) 72 y.o. female with diagnosis of Osteoarthritis.  She will be off prednisone soon therefore we'll restart an anti-inflammatory. Salsalate now is too expensive for her so we'll try diflunisal 500 mg twice a day or 3 times a day  He will need CMP a few weeks after she starts the medication.                Discussion:     I have explained all of the above in detail and the patient understands all of the current recommendation(s). I have answered all questions to the best of my ability and to their complete satisfaction.       The patient is to continue " with the current management plan         RTC in  4 months       Electronically signed by Edmond Pak MD

## 2017-09-25 ENCOUNTER — TELEPHONE (OUTPATIENT)
Dept: OBSTETRICS AND GYNECOLOGY | Facility: CLINIC | Age: 72
End: 2017-09-25

## 2017-09-25 DIAGNOSIS — Z12.31 VISIT FOR SCREENING MAMMOGRAM: Primary | ICD-10-CM

## 2017-09-25 NOTE — TELEPHONE ENCOUNTER
----- Message from aTmica Lake sent at 9/25/2017  2:20 PM CDT -----  Contact: pt   Pt would like orders put in the system and schedule for her mammogram in [place],please....[phone]

## 2017-09-27 ENCOUNTER — TELEPHONE (OUTPATIENT)
Dept: ELECTROPHYSIOLOGY | Facility: CLINIC | Age: 72
End: 2017-09-27

## 2017-09-27 NOTE — TELEPHONE ENCOUNTER
----- Message from Olga Becker MA sent at 9/27/2017 11:26 AM CDT -----  Contact: patient called  Please call the patient at home she need refill on he medication  Lasix 20 mg please call [facility] pharmacy [phone] the patient said she can not sleep at night. Thank you.

## 2017-09-27 NOTE — TELEPHONE ENCOUNTER
Returned Pt's call and spoke with Pt's . They are out shopping and the Pt is in another store. He states she is running low on the lasix and doesn't want to run out. Explained that the lasix was given post pvi ablation it most likely wont be continued. Advised I would check with Dr Calderon and let them know.

## 2017-09-28 ENCOUNTER — TELEPHONE (OUTPATIENT)
Dept: ELECTROPHYSIOLOGY | Facility: CLINIC | Age: 72
End: 2017-09-28

## 2017-09-28 NOTE — TELEPHONE ENCOUNTER
----- Message from Miryam Stephen RN sent at 9/28/2017  3:38 PM CDT -----  Contact: patient      ----- Message -----  From: Benito Sims  Sent: 9/28/2017   2:36 PM  To: Dacia Soto RN    Please call pt at [phone]. Returning your call regarding her medication    Thank you

## 2017-09-28 NOTE — TELEPHONE ENCOUNTER
Called Pt to see how she was feeling. She was given lasix post PVI and is calling for a refill. No answer. Left message for her to return call.

## 2017-10-09 ENCOUNTER — TELEPHONE (OUTPATIENT)
Dept: ELECTROPHYSIOLOGY | Facility: CLINIC | Age: 72
End: 2017-10-09

## 2017-10-09 NOTE — TELEPHONE ENCOUNTER
Spoke with DR Calderon and he would like to hear what the eye doctor recommends.     Called Dr Davis. She stated that the Pt had a spontaneous bleed behind her right eye. She started the Pt on steroid and atropine drops. She stated the xarelto may have made the eye bleed more but probably didn't cause the spontaneous bleed. There was too much blood for her to see today if it was a blood vessel or not. Will reexamine on Thursday. For now no need to stop Xarelto.     Called Pt to explain to her that Xarelto will not be stopped. DR Davis will reexamine on Thursday to determine etiology.

## 2017-10-09 NOTE — TELEPHONE ENCOUNTER
----- Message from Leticia Celis sent at 10/9/2017 11:22 AM CDT -----  Contact: pt [phone]  Pt went to the eye doctor today and was told she has bleeding behind her eye and it may be related to the Xarelto 20 mg. Please call her at the number listed.    Thanks

## 2017-10-09 NOTE — TELEPHONE ENCOUNTER
Returned Pt's call. Pt started with blurry vision in her right eye yesterday. She saw eye doctor this morning and she has bleeding behind her right eye. She is questioning wether Xarelto can be stopped. Advised I would speak with Dr Calderon and call with his recommendations. Pt voiced understanding.

## 2017-10-17 RX ORDER — LOSARTAN POTASSIUM 100 MG/1
TABLET ORAL
Qty: 90 TABLET | Refills: 2 | Status: SHIPPED | OUTPATIENT
Start: 2017-10-17 | End: 2018-07-05 | Stop reason: SDUPTHER

## 2017-10-27 RX ORDER — RIVAROXABAN 20 MG/1
TABLET, FILM COATED ORAL
Qty: 30 TABLET | Refills: 6 | Status: SHIPPED | OUTPATIENT
Start: 2017-10-27 | End: 2018-05-30 | Stop reason: SDUPTHER

## 2017-11-01 ENCOUNTER — OFFICE VISIT (OUTPATIENT)
Dept: CARDIOLOGY | Facility: CLINIC | Age: 72
End: 2017-11-01
Payer: MEDICARE

## 2017-11-01 VITALS
DIASTOLIC BLOOD PRESSURE: 78 MMHG | HEART RATE: 61 BPM | HEIGHT: 67 IN | WEIGHT: 146.63 LBS | SYSTOLIC BLOOD PRESSURE: 144 MMHG | BODY MASS INDEX: 23.01 KG/M2

## 2017-11-01 DIAGNOSIS — Z98.890 S/P ABLATION OF ATRIAL FIBRILLATION: ICD-10-CM

## 2017-11-01 DIAGNOSIS — K21.9 GASTROESOPHAGEAL REFLUX DISEASE WITHOUT ESOPHAGITIS: ICD-10-CM

## 2017-11-01 DIAGNOSIS — Z86.79 S/P ABLATION OF ATRIAL FIBRILLATION: ICD-10-CM

## 2017-11-01 DIAGNOSIS — Z79.01 ANTICOAGULATED: ICD-10-CM

## 2017-11-01 DIAGNOSIS — Z86.73 H/O: CVA (CEREBROVASCULAR ACCIDENT): ICD-10-CM

## 2017-11-01 DIAGNOSIS — I48.0 PAROXYSMAL ATRIAL FIBRILLATION: Primary | ICD-10-CM

## 2017-11-01 DIAGNOSIS — I11.9 HYPERTENSIVE LEFT VENTRICULAR HYPERTROPHY, WITHOUT HEART FAILURE: ICD-10-CM

## 2017-11-01 DIAGNOSIS — I10 ESSENTIAL HYPERTENSION: ICD-10-CM

## 2017-11-01 DIAGNOSIS — Z86.79 S/P ABLATION OF ATRIAL FLUTTER: ICD-10-CM

## 2017-11-01 DIAGNOSIS — E78.2 MIXED HYPERLIPIDEMIA: ICD-10-CM

## 2017-11-01 DIAGNOSIS — I48.4 ATYPICAL ATRIAL FLUTTER: ICD-10-CM

## 2017-11-01 DIAGNOSIS — Z98.890 S/P ABLATION OF ATRIAL FLUTTER: ICD-10-CM

## 2017-11-01 DIAGNOSIS — I48.91 ATRIAL FIBRILLATION, UNSPECIFIED TYPE: ICD-10-CM

## 2017-11-01 PROCEDURE — 93005 ELECTROCARDIOGRAM TRACING: CPT | Mod: PBBFAC,PO | Performed by: INTERNAL MEDICINE

## 2017-11-01 PROCEDURE — 93010 ELECTROCARDIOGRAM REPORT: CPT | Mod: S$PBB,,, | Performed by: INTERNAL MEDICINE

## 2017-11-01 PROCEDURE — 99214 OFFICE O/P EST MOD 30 MIN: CPT | Mod: S$PBB,,, | Performed by: INTERNAL MEDICINE

## 2017-11-01 PROCEDURE — 99999 PR PBB SHADOW E&M-EST. PATIENT-LVL III: CPT | Mod: PBBFAC,,, | Performed by: INTERNAL MEDICINE

## 2017-11-01 PROCEDURE — 99213 OFFICE O/P EST LOW 20 MIN: CPT | Mod: PBBFAC,PO | Performed by: INTERNAL MEDICINE

## 2017-11-01 RX ORDER — PREDNISOLONE ACETATE 10 MG/ML
1 SUSPENSION/ DROPS OPHTHALMIC 4 TIMES DAILY
COMMUNITY
End: 2017-11-21

## 2017-11-01 RX ORDER — PANTOPRAZOLE SODIUM 40 MG/1
40 TABLET, DELAYED RELEASE ORAL DAILY
Qty: 90 TABLET | Refills: 3 | Status: SHIPPED | OUTPATIENT
Start: 2017-11-01 | End: 2020-06-10 | Stop reason: SDUPTHER

## 2017-11-01 RX ORDER — FUROSEMIDE 20 MG/1
20 TABLET ORAL DAILY
Qty: 30 TABLET | Refills: 3 | Status: SHIPPED | OUTPATIENT
Start: 2017-11-01 | End: 2018-01-17

## 2017-11-01 NOTE — PROGRESS NOTES
Subjective:      Atrial Fibrillation   Symptoms are negative for chest pain, palpitations, shortness of breath and syncope. Past medical history includes atrial fibrillation.     Cardiologist: Barrett Jurado MD    I had the pleasure of seeing Ayla Dela Cruz in follow up for her history of atrial arrhythmias and recent PVI. She is a 72 year old female with a history of HTN, HLD, and TIA in 2014, whose history of arrhythmias dates back to her 30s when she began to experience sudden onset palpitations. She was started on Tambacor at that time, which improved her symptoms. She was started on Coumadin at age 55 years. In the mid-1990s, she underwent an ablation for what sounds like atrial flutter in [place], [place]. In 1997 she underwent a second ablation which she was told was unsuccessful. Since that time she has undergone two electrical cardioversions, once in the mid-2000s (which lasted 5 years), and another around 2010. Around 2014 her arrhythmias recurred around the time she had her TIA. She was started on Xarelto and restarted on Tambacor at that time. Notably, on 100 mg bid she was having AF recurrences and SOB. The dose was lowered to 50 mg bid and eventually stopped. When I initially saw her she was only on Metoprolol.    Recent cardiac studies include an echo from 10/2016 which showed an EF of 65-70%, normal LA size, and a PASP of 64 mmHg (EF 40% in 11/2014).    I reviewed all ECGs in the EMR at her initial office visit. ECGs from 4510-4921 showed sinus rhythm. ECGs from 1/2014 and 4/2014 showed AF. ECGs between 6/2014 and 1/2016 showed sinus bradycardia and NSR. All ECGs between 1/16/2016 and 5/2017 showed either AF or AFL. When in AFL, RVR was generally seen.    In 9/2017, a PVI was performed. All four PVs were reconnected from a prior PVI, and successfully reisolated. A septal MA flutter line was also created due to frequent inductions during the case. The existing CTI-line was found to be intact.  She was discharged on Amiodarone 100 mg daily. Since the ablation Ms. Dela Cruz has been feeling well apart from occasional AVILA relieved with lasix. She denies recurrent palpitations. Her energy level has vastly improved since pre-ablation.    I reviewed today's ECG tracing, which shows sinus rhythm at 61 bpm.    Review of Systems   Constitution: Negative for decreased appetite, malaise/fatigue, weight gain and weight loss.   HENT: Negative for sore throat.    Eyes: Negative for blurred vision.   Cardiovascular: Positive for dyspnea on exertion. Negative for chest pain, irregular heartbeat, leg swelling, near-syncope, orthopnea, palpitations, paroxysmal nocturnal dyspnea and syncope.   Respiratory: Negative for shortness of breath.    Skin: Negative for rash.   Musculoskeletal: Negative for arthritis.   Gastrointestinal: Negative for abdominal pain.   Neurological: Negative for focal weakness.   Psychiatric/Behavioral: Negative for altered mental status.        Objective:    Physical Exam   Constitutional: She is oriented to person, place, and time. She appears well-developed and well-nourished. No distress.   HENT:   Head: Normocephalic and atraumatic.   Mouth/Throat: Oropharynx is clear and moist.   Eyes: Conjunctivae are normal. Pupils are equal, round, and reactive to light. No scleral icterus.   Neck: Normal range of motion. Neck supple. No JVD present. No thyromegaly present.   Cardiovascular: Normal rate, regular rhythm, normal heart sounds and intact distal pulses.  Exam reveals no gallop and no friction rub.    No murmur heard.  Pulmonary/Chest: Effort normal and breath sounds normal. No respiratory distress. She has no rales.   Abdominal: Soft. Bowel sounds are normal. She exhibits no distension.   Musculoskeletal: She exhibits no edema.   Neurological: She is alert and oriented to person, place, and time.   Skin: Skin is warm and dry.   Psychiatric: She has a normal mood and affect. Her behavior is  normal.   Vitals reviewed.        Assessment:       1. Paroxysmal atrial fibrillation, ablations X 2 1995, 1997, CHADS-VAS score 5, HAS-BLED score 5     2. Atypical atrial flutter    3. S/P ablation of atrial fibrillation    4. S/P ablation of atrial flutter    5. H/O: CVA (cerebrovascular accident), June 2014    6. Hypertensive left ventricular hypertrophy, without heart failure    7. Essential hypertension    8. Mixed hyperlipidemia    9. Anticoagulated    10. Gastroesophageal reflux disease without esophagitis         Plan:   In summary, Ayla Dela Cruz is a 72 year old female with a longstanding history of atrial arrhythmias. Her OPP6PT2-DXAt Score is 5 (age, female, TIA, HTN) so she should remain on anticoagulation indefinitely. She has failed Flecainide. She is now 6 weeks post-PVI and MA flutter line, and maintaining sinus rhythm on low-dose Amiodarone. Given her intermittent AVILA which started post-ablation, I have stopped Amiodarone which I think is the likely culprit. I instructed her to continue taking Lasix PRN, as well as metoprolol and Xarelto.    She will follow-up with me in 3 months or sooner if needed.    Thank you for allowing me to participate in the care of this patient. Please do not hesitate to call me with any questions or concerns.

## 2017-11-07 ENCOUNTER — DOCUMENTATION ONLY (OUTPATIENT)
Dept: FAMILY MEDICINE | Facility: CLINIC | Age: 72
End: 2017-11-07

## 2017-11-07 ENCOUNTER — OFFICE VISIT (OUTPATIENT)
Dept: FAMILY MEDICINE | Facility: CLINIC | Age: 72
End: 2017-11-07
Payer: MEDICARE

## 2017-11-07 VITALS
WEIGHT: 143.31 LBS | HEART RATE: 54 BPM | BODY MASS INDEX: 22.49 KG/M2 | DIASTOLIC BLOOD PRESSURE: 66 MMHG | TEMPERATURE: 98 F | SYSTOLIC BLOOD PRESSURE: 141 MMHG | HEIGHT: 67 IN

## 2017-11-07 DIAGNOSIS — S46.811A TRAPEZIUS STRAIN, RIGHT, INITIAL ENCOUNTER: Primary | ICD-10-CM

## 2017-11-07 DIAGNOSIS — I10 ESSENTIAL HYPERTENSION: ICD-10-CM

## 2017-11-07 PROCEDURE — 99213 OFFICE O/P EST LOW 20 MIN: CPT | Mod: S$PBB,,, | Performed by: PHYSICIAN ASSISTANT

## 2017-11-07 PROCEDURE — 96372 THER/PROPH/DIAG INJ SC/IM: CPT | Mod: PBBFAC,PO

## 2017-11-07 PROCEDURE — 99999 PR PBB SHADOW E&M-EST. PATIENT-LVL III: CPT | Mod: PBBFAC,,, | Performed by: PHYSICIAN ASSISTANT

## 2017-11-07 PROCEDURE — 99213 OFFICE O/P EST LOW 20 MIN: CPT | Mod: PBBFAC,PO | Performed by: PHYSICIAN ASSISTANT

## 2017-11-07 RX ORDER — KETOROLAC TROMETHAMINE 30 MG/ML
30 INJECTION, SOLUTION INTRAMUSCULAR; INTRAVENOUS
Status: COMPLETED | OUTPATIENT
Start: 2017-11-07 | End: 2017-11-07

## 2017-11-07 RX ADMIN — KETOROLAC TROMETHAMINE 30 MG: 30 INJECTION, SOLUTION INTRAMUSCULAR; INTRAVENOUS at 10:11

## 2017-11-07 NOTE — PATIENT INSTRUCTIONS
Head Tilt / Upper Trapezius Stretch (Flexibility)    1. Sit up straight in a chair with your head and neck in a neutral position, ears in line with shoulders. Hold the edge of your chair seat with your right hand. Tuck your chin in slightly.  2. Tilt your head to the left, while looking straight ahead.  3. Put your left hand on the right side of your head. Gently pull your head to the left. Hold for 30 to 60 seconds. Use gentle pressure to increase the stretch. Dont force your head into position.  4. Return your head and neck to the neutral position.  5. Repeat this exercise 2 times, or as instructed.  6. Switch sides and repeat 2 times, or as instructed.  Challenge yourself  Tuck one end of a towel under your left arm. Then bring the other end over your right shoulder. Pull the towel down on your right shoulder with both hands as you side-bend your head to the left. Repeat with the other side.   Date Last Reviewed: 3/10/2016  © 1391-8958 [employer]. [address] [place] [id number]. All rights reserved. This information is not intended as a substitute for professional medical care. Always follow your healthcare professional's instructions.

## 2017-11-07 NOTE — PROGRESS NOTES
Pre-Visit Chart Review  For Appointment Scheduled on 11/07/17    Health Maintenance Due   Topic Date Due    TETANUS VACCINE  03/12/1963    Zoster Vaccine  03/12/2003

## 2017-11-07 NOTE — PROGRESS NOTES
Subjective:       Patient ID: Ayla Dela Cruz is a 72 y.o. female.    Chief Complaint: Back Pain    HPI   Patient is a 73 year old  female with HTN, HLD, GERD, Glaucoma, CHF presenting to the clinic with c/o right sided posterior shoulder pain provoked after episode of carrying multiple books recently. She reports pain started the day after, but has persisted since this time. Pain is worst at night time as she can not get into a comfortable position to sleep. She is taking prednisone via dermatology for itchess/skin reaction to possible medication.  Review of Systems   Constitutional: Negative for activity change, appetite change, chills, diaphoresis, fatigue and fever.   HENT: Negative for congestion, postnasal drip and rhinorrhea.    Respiratory: Negative.  Negative for cough, shortness of breath and wheezing.    Cardiovascular: Negative.  Negative for chest pain.   Gastrointestinal: Negative for abdominal pain, blood in stool, constipation, diarrhea, nausea and vomiting.   Genitourinary: Negative for dysuria, frequency, hematuria and urgency.   Musculoskeletal: Positive for myalgias. Negative for back pain.   Skin: Negative.  Negative for color change and rash.   Neurological: Negative for dizziness and syncope.   Psychiatric/Behavioral: Negative for agitation, behavioral problems and confusion.       Objective:      Physical Exam   Constitutional: Vital signs are normal. She appears well-developed and well-nourished. No distress.   Cardiovascular: Normal rate, regular rhythm, S1 normal, S2 normal and normal heart sounds.  Exam reveals no gallop.    No murmur heard.  Pulses:       Radial pulses are 2+ on the right side, and 2+ on the left side.   <2sec cap refill fingers bilat     Pulmonary/Chest: Effort normal and breath sounds normal. No respiratory distress. She has no wheezes. She has no rhonchi.   Musculoskeletal:        Arms:  Skin: Skin is warm and dry. She is not diaphoretic.   Appropriate  skin turgor   Psychiatric: She has a normal mood and affect. Her speech is normal and behavior is normal. Judgment and thought content normal. Cognition and memory are normal.       Assessment:       1. Trapezius strain, right, initial encounter    2. Essential hypertension        Plan:   Ayla was seen today for back pain.    Diagnoses and all orders for this visit:    Trapezius strain, right, initial encounter  -     ketorolac injection 30 mg; Inject 30 mg into the muscle one time.  Recommended/given patient instructions on stretches for trapezius muscle. She is to let us know if pain worsens or does not improve.    Essential hypertension  Good control  No changes needed    Patient readiness: acceptance and barriers:none    During the course of the visit the patient was educated and counseled about the following:     Hypertension:   Medication: no change.    Goals: Hypertension: Reduce Blood Pressure    Did patient meet goals/outcomes: No    The following self management tools provided: declined    Patient Instructions (the written plan) was given to the patient/family.     Time spent with patient: 15 minutes

## 2017-11-09 ENCOUNTER — TELEPHONE (OUTPATIENT)
Dept: FAMILY MEDICINE | Facility: CLINIC | Age: 72
End: 2017-11-09

## 2017-11-09 NOTE — TELEPHONE ENCOUNTER
Patient is requesting tramadol patient states this has help for her pain in the past. Notified [facility] can not prescribed this medication. Notified will send a message to [facility],notified [facility] will address tomorrow due to out of office

## 2017-11-09 NOTE — TELEPHONE ENCOUNTER
----- Message from Yoana Temple sent at 11/9/2017 12:00 PM CST -----  Contact: self [phone]  Patient called stating the injection for back pain is not working. The patient asking for new rx tramadol 50 mg with [facility] on [address] [facility] [address]. Thanks!

## 2017-11-10 ENCOUNTER — HOSPITAL ENCOUNTER (OUTPATIENT)
Dept: CARDIOLOGY | Facility: HOSPITAL | Age: 72
Discharge: HOME | End: 2017-11-10
Attending: INTERNAL MEDICINE
Payer: MEDICARE

## 2017-11-10 DIAGNOSIS — I48.91 ATRIAL FIBRILLATION, UNSPECIFIED TYPE: ICD-10-CM

## 2017-11-10 PROCEDURE — 93225 XTRNL ECG REC<48 HRS REC: CPT

## 2017-11-10 PROCEDURE — 93227 XTRNL ECG REC<48 HR R&I: CPT | Mod: S$PBB,,, | Performed by: INTERNAL MEDICINE

## 2017-11-10 NOTE — TELEPHONE ENCOUNTER
Patient states she told 1 extra strength of Excedrin.  And it has help patient declined muscle relaxer

## 2017-11-15 ENCOUNTER — OFFICE VISIT (OUTPATIENT)
Dept: CARDIOLOGY | Facility: CLINIC | Age: 72
End: 2017-11-15
Payer: MEDICARE

## 2017-11-15 VITALS
BODY MASS INDEX: 22.56 KG/M2 | SYSTOLIC BLOOD PRESSURE: 158 MMHG | HEART RATE: 61 BPM | WEIGHT: 143.75 LBS | DIASTOLIC BLOOD PRESSURE: 69 MMHG | HEIGHT: 67 IN | OXYGEN SATURATION: 97 %

## 2017-11-15 DIAGNOSIS — Z98.890 S/P ABLATION OF ATRIAL FIBRILLATION: ICD-10-CM

## 2017-11-15 DIAGNOSIS — I48.0 PAROXYSMAL ATRIAL FIBRILLATION: Primary | ICD-10-CM

## 2017-11-15 DIAGNOSIS — T14.8XXA MUSCLE STRAIN: ICD-10-CM

## 2017-11-15 DIAGNOSIS — F13.939 BENZODIAZEPINE WITHDRAWAL WITH COMPLICATION: ICD-10-CM

## 2017-11-15 DIAGNOSIS — T43.615D: ICD-10-CM

## 2017-11-15 DIAGNOSIS — Z86.79 S/P ABLATION OF ATRIAL FIBRILLATION: ICD-10-CM

## 2017-11-15 DIAGNOSIS — F41.1 GAD (GENERALIZED ANXIETY DISORDER): ICD-10-CM

## 2017-11-15 DIAGNOSIS — Z79.01 ANTICOAGULATED: ICD-10-CM

## 2017-11-15 PROBLEM — T43.615A CAFFEINE ADVERSE REACTION: Status: ACTIVE | Noted: 2017-11-15

## 2017-11-15 PROCEDURE — 99999 PR PBB SHADOW E&M-EST. PATIENT-LVL III: CPT | Mod: PBBFAC,,, | Performed by: INTERNAL MEDICINE

## 2017-11-15 PROCEDURE — 99214 OFFICE O/P EST MOD 30 MIN: CPT | Mod: S$PBB,,, | Performed by: INTERNAL MEDICINE

## 2017-11-15 PROCEDURE — 99213 OFFICE O/P EST LOW 20 MIN: CPT | Mod: PBBFAC,PO | Performed by: INTERNAL MEDICINE

## 2017-11-15 RX ORDER — AMIODARONE HYDROCHLORIDE 100 MG/1
100 TABLET ORAL DAILY
Qty: 90 TABLET | Refills: 1 | Status: SHIPPED | OUTPATIENT
Start: 2017-11-15 | End: 2018-01-17

## 2017-11-15 RX ORDER — AMIODARONE HYDROCHLORIDE 100 MG/1
100 TABLET ORAL DAILY
COMMUNITY
End: 2017-11-15 | Stop reason: SDUPTHER

## 2017-11-15 RX ORDER — TRAMADOL HYDROCHLORIDE 50 MG/1
50 TABLET ORAL EVERY 6 HOURS PRN
Qty: 12 TABLET | Refills: 0 | Status: SHIPPED | OUTPATIENT
Start: 2017-11-15 | End: 2017-11-25

## 2017-11-15 NOTE — PROGRESS NOTES
Subjective:    Patient ID:  Ayla Dela Cruz is a 72 y.o. female who presents for evaluation of Follow-up  For PAF on amiodarone 100 mg daily , AVILA, post AF - ablation, LVH, chronic diastolic HF   PCP: Dr. Olivo, see annually, do labs  Surgeon: Dr. Gonsalez, and Dr. Dc  Rheumatologist: Dr. Mellisa Pace cardiologist: Dr. Gibson, last seen over a year  Pulmonary: Dr. White  Psychologist: Nu Fermin NP, in [place]  Gyn: Dr. Jordan  GI: Dr. Schultz  Neurologist: Dr. Ramirez  Dermatologist: Dennis Corrigan  Lives with , Jan, here with patient, non-smoker, history of prostate CA,  50 years on 6/24  daughter, Lyric, source of stress  Restarted , now 2-4 times weekly, still enjoys it    HPI  Hospitalized 6/3/2014 for acute right sided weakness and slurred speech  DCS - Ayla Dela Cruz is a 69 y.o. female admitted to the services of hospital medicine via the ER for acute CVA. Presented with difficulty speaking, facial drooping and weakness of the right upper and lower extremities. She missed the time window for tPA. ST/PT/OT as well as cardiology and neurology were consulted. Dr. Jurado of cardiology managed A fib. Patient rapidly improved functioning with PT/OT/ST. Currently her goals with PT are met as she is ambulating over 400 feet independently.  She was not approved for IP rehab and refuses SNF. She will be discharged home with outpatient PT/ST/OT evaluation and treatment.    Neurocardio work up  CT head - Mild involutional changes and findings suggesting mild microvascular ischemic change with no acute intracranial findings    Carotid US - No hemodynamically significant stenosis is noted by velocity criteria involving either internal carotid artery.  A hemodynamically significant stenosis is defined as a stenosis of greater than 50% of the internal carotid artery vessel lumen    ECHO, 6/4/2014, CONCLUSIONS     1 - Concentric hypertrophy. Wall thickness is mildly  increased, with the septum and the posterior wall each measuring 1.1 cm across.    2 - Normal left ventricular systolic function (EF 60-65%).     3 - Mild mitral regurgitation.     4 - Left ventricular diastolic dysfunction.     5 - Pulmonary hypertension. estimated PA systolic pressure is 64 mmHg.    6 - Normal right ventricular systolic function .     7 - Suggestion for increase in LV mass from echo on 3/18/2014..     Echo bubble study also negative. Had episode of PAF during monitoring and convert with restart of Flecainide.   Since DC, improving strength in the right hand, right leg feels normal but tire easier. Speech improving. To receive PT/OT/speech today.  Medications reviewed - will DC ASA for now, and know the effects of the Limbitrol and Ativan, uses prn rarely. Some loss of appetite and taste.    Active problems in hospital  Acute left hemispheric CVA, MRI suggest multiple areas, was not on OAC nor antiplatelet Rx  PAF with high CHADS-VAS score, post ablations and DCCs  HTN with LVH  Interstitial lung disease  Pulmonary hypertension  Hyperlipidemia was not on statin    Since visit of 6/20, problem with peripheral swelling, now taking Lasix daily. Last hospitalization / CMP, 6/3 showed K+ 3.4 with normal renal function. Also noted AVILA along with exertional chest heaviness, on-and-off over past several years. Noted it with walking and have to rest. Can last up to an hour. Max grade 10/10, yesterday during the day.  in hospital. Also quite anxious, stopped Limbitrol due to side effects, managed by Dr. Olivo. Quite sedentary and major decrease in appetite, due to depression. No Psychiatrist. Home BP high 175/97. ECG shows NSR, rate 61, right axis, nonspecific T waves abnormalities, prolong QTc 483 msec. Low anterior forces.    Holter, 6/30/2014:  PREDOMINANT RHYTHM  1. Sinus rhythm with heart rates varying between 43 and 67 bpm with an average of 55 bpm.     VENTRICULAR ARRHYTHMIAS  1. There were  "frequent polymorphic PVCs totalling 1388 and averaging 40 per hour.  There was 1 couplet.    2. There were no episodes of ventricular tachycardia.    SUPRA VENTRICULAR ARRHYTHMIAS  1. There were very rare PACs totalling 47 and averaging 1 per hour.     2. There were no episodes of sustained supraventricular tachycardia.    SINUS NODE FUNCTION  1. There was no evidence of high grade SA khai block.     AV CONDUCTION  1. There was no evidence of high grade AV block.     DIARY  1. The diary was returned, but not completed    MISCELLANEOUS  1. This was a tape of adequate length (34 hrs).     Since visit of 7/24, better, reviewed by Dr. Olivo and started antidepressant Lexapro. BMP still showed mild hypokalemia of 3.3, not using Lasix at this time. Still feeling fatigue, anxiety, and request refill for Nexium. Discussed need for GI review on chronic PPI. Lack of appetite.  Lexiscan, 7/30/2014, - Nuclear Quantitative Functional Analysis:   LVEF: 66 % (normal is 55 - 69)  LVED Volume: 50 ml (normal is 60 - 98)  LVES Volume: 17 ml (normal is 20 - 42)    Impression: NORMAL MYOCARDIAL PERFUSION  1. The perfusion scan is free of evidence for myocardial ischemia or injury.   2. There is a mild intensity fixed defect in the anteroseptal wall of the left ventricle, secondary to breast attenuation.   3. Resting wall motion is physiologic.   4. Resting LV function is normal.  (normal is 55 - 69)  5. The ventricular volumes are normal at rest and stress.   6. The extracardiac distribution of radioactivity is normal.     No problem with spironolactone, BP improved, home BP similar to office readings.    Since visit of 8/14, had some distress and home monitor showed HR of 40, felt "bad" and nervous to ED, note reviewed, ECG and final pulse count 57-58. Labs reviewed - normal renal function and K+ 3.8. Still taking one tab of K+ daily. Not using Lasix. Explained HR discrepancy may be due to VPCs. Reviewed by Ms. Fermin and Jermaine " "increased to 20 mg, 2 days ago. Feeling "a lot better". Sleeping better. Still sedentary but ready to be more active. Eating better have lost additional 5 lbs since 8/2014.    Since visit of 9/5, still with speech therapy, noted progressive near syncope with SOB and irregular heart beats. Also noted some ankle swelling over the past 2 weeks. ECG shows marked bradycardia, rate 48, right axis, pulmonary pattern, 1st degree AVB. Wellbutrin 100 mg daily along with Lexapro 20 mg by Ms. Jeny SHEIKH. BMP showed K+3.5. To use Lasix PRN    Since visit of 9/25, still with marked AVILA, only able to walk about 30' before having to stop. Bothered by increase palpitations during tapering of BB. No definite lung problem, evaluated this year. ECG shows sinus dewayne, rate 53, VPC, RBBB with suggestion of septal MI. No evidence for anemia - CBC 9/3/2014 was normal. Just started doing some activities with arm and leg exercise for the last 2 weeks, Doing some OT alternating with PT every other day.  CXR, 6/3/2014 - Lungs are clear of infiltrate and costophrenic sulci are sharp.  The cardiomediastinal silhouette appears stable.    PET scan, 4/2014 - 1.A hypermetabolic left pulmonary mass is noted with an SUV of 3.0 maximally.  A neoplastic, infectious, or granulomatous process is on the differential. Clinical correlation is suggested.  Close follow-up for resolution or biopsy would be suggested    2.  Elevated right-sided heart pressures are suspected with a large right atrium    3.  Right-sided pleural effusion    4.  Hypermetabolic thyroid gland asymmetrically on the right.  This may relate to thyroiditis, Graves' disease, or neoplastic process.  Ultrasound may be helpful.    5.  Small hypermetabolic focus in the expected location of the left ovary, a female pelvis ultrasound may be helpful for further evaluation.  This could relate to a uterine fibroid that has necrosed or infarcted    Biopsy of lung nodule on 5/1/2014 - negative for " "CA    CTA, 4/2014 - No evidence of pulmonary thromboembolism.    2.  Enlarged cardiac silhouette and secondary findings of elevated right heart pressures with diffuse mosaic lung pattern and right basilar pleural fluid suggesting cardiac decompensation/heart failure.    3. Unchanged 1 cm nodular density within the left upper lobe which previously demonstrated hypermetabolism by PET/CT and unchanged mediastinal lymphadenopathy.    4.  Subpleural nodular density within the right upper lobe is unchanged when compared with the previous study and could represent an area of remote fibrotic scarring.    5.  Heterogeneous appearance of the spleen, possibly due to the phase of contrast enhancement.  Evolving splenic infarction is not entirely excluded.    6. Suggestion of colonic wall thickening involving the descending colon.  Please correlate for symptoms of colitis.    Since visit of 10/14, rehospitalized for HF on 10/26 to 10/29/2014.  DCS - "Ayla Dela Cruz is a 69 y.o. female admitted to the services of hospital medicine via the ER for acute diastolic heart failure. C/o severe SOB and increase in leg swelling over the past two days. Under the care of Dr. Jurado. Recently was taken off metoprolol. History of paroxsymal atrial fib. Hospitalized here in June in this year for acute CVA. It was at that time, pt started Xarelto. Deficits has resolved. No history of heart failure.     Hospital Course: The patient was admitted with acute CHF biventricular and mild elevation of her troponin's at 0.029 - 0.035 and therefore an anginal equivalent was suspected. The patient also had significant hypertension upon admission and although she was not in atrial fibrillation, her EKG showed a significant first degree AV block and RBBB. Discussion was made as to whether or not to decrease the patient flecainide; however due to the risk of her going back into atrial fibrillation this was not done. Upon discharge the patient's lisinopril " "was increased to 10 mg and aldactone was added."    RHC done HEMODYNAMIC RESULTS:    LVEDP (Pre): 24 mmHg  BP: 166/104    Air rest:  PA: 90/34 (54)  PW:  (24)  RVEDP: 16  RA:  (16)    C. SUMMARY:    1. Diastolic dysfunction.  2. - Kee CO 2.64 L/min  3. - Mean systemic pressure 108.6 mmHG, stage II HTN  4. - SVR 41.16 Wood Units  5. - PVR 11.36 Wood Units  6. - Pulmonary HTN due to left sided heart disease and stage II HTN    D. RECOMMENDATIONS:    1. Routine post-cath care.  2. Cardiac rehab referral.  3. Follow up with Dr. Barrett Jurado.  4. - Aggressive BP lowering and diuresis    Repeat ECHO 11/5/2014 CONCLUSIONS     1 - Concentric remodeling. Septal wall thickness is increased, and posterior wall thickness is mildly increased, with the septum measuring 1.3 cm and the posterior wall measuring 1.1 cm across.    2 - Mildly to moderately depressed left ventricular systolic function (EF 40-45%).     3 - Left ventricular diastolic dysfunction. E/e'(lat) is 16    4 - Right ventricular enlargement with mildly depressed systolic function.     5 - Pulmonary hypertension. estimated PA systolic pressure is 56 mmHg.     6 - Intermediate central venous pressure.     7 - No evidence of intracardiac shunt.     8 - No significant change from Echo of 6/4/2014.    Active problems:  Progressive severe SOB, functional class IV  Diastolic dysfunction, elevated BNP and Troponin  Hypokalemia due to diuretics  Secondary Pulmonary HTN with peripheral edema  HTN  History of CVA 6/3/2014  PAF controlled with Flecanide  Marked bradycardia with BB  On OAC  Hematuria    At home receiving PT, OT and speech Rx twice a week, with  nurse once a week, no problem with medications. Feeling better, sleeping well. Home /73.    Since visit of 11/14, feeling "much better", concern of occasional HTN, home BP /66-99, HR . Lot more active, no problem. Not using walker, no CP, SOB, nor dizziness. Recent BMP - normal renal function and K+ " "4.1.    Since visit of 12/19/2014, worry about HTN home readings similar to office up to . Morning reading is higher. No HA nor visual abnormalities. Xanax has helped but afraid to use too much. ECG shows sinus arrhythmia, rate of 65, late transition and LAFB. Also bothered with dry cough with the Lisinopril. And started to have return of arm pains that was attributed to atorvastatin, on Crestor. Discussed options.     Since visit of 1/16/2015, noted frequent nocturia, 8-10 times, taking losartan-HCT at night time. Have stopped using Lasix and spironolactone for past 2 months. More active without much problem. Labs normal renal function, K+ 4.2, BNP now 37, A1C 5.6%.    Since visit of 2/18/2015, improving, no further dizziness, some SOB when "stressed", usually helped with alprazolam, use only prn, about 3-6 times weekly. Compliant with medications. Been off Crestor for 6 weeks with concern of muscle problem. Home BP is fine, similar to office.     Since visit of 4/15, appetite returned, feeling a lot better. Active, walking twice a week for about half an hour. Also do calisthenic about twice a week, no problem. Seeing Dr. Zavaleta for generalized pruritis for past 3 months. Cardiac wise less AVILA. Sleeping well. Labs in 5/2015, normal CBC without eosinophilia, thyroid normal, ferritin level low normal at 21. Off Crestor for couple months due to fear of muscle pain but did not find much difference being off medication. Last Lipid in 11/2014 was good, on daily dose of Crestor. Home /79.    Since visit of 7/2/2015, feeling "great", very active without problem, no more AVILA nor dizziness with standing. Compliant with medications, don't miss. Using Tylenol 500 mg BID for arthritis. Notice easy bruising on Xarelto. Home /10.  Holter - PREDOMINANT RHYTHM  1. Sinus arrhythmia with heart rates varying between 46 and 110 bpm with an average of 73 bpm.     VENTRICULAR ARRHYTHMIAS  1. There were very rare PVCs " recorded totalling 8 and averaging less than 1 per hour.     2. There were no episodes of ventricular tachycardia.    SUPRA VENTRICULAR ARRHYTHMIAS  1. There were very frequent PACs totalling 3054 and averaging 132 per hour.  There were 29 bigeminal cycles.  There were 103 couplets.    2. 3% of complexes.    3. There were no episodes of sustained supraventricular tachycardia.    SINUS NODE FUNCTION  1. There was no evidence of high grade SA khai block.     AV CONDUCTION  1. There was no evidence of high grade AV block.     2. The longest RR interval was 1565 msec.     DIARY  1. The diary was properly completed.     2. There was 1 episode of skipping beats reported. The corresponding rhythm strips revealed the following:             During event 1 the rhythm was sinus rhythm at 75 bpm.     3. There was 1 episode of skipped beat reported. The corresponding rhythm strips revealed the following:             During event 1 the rhythm was sinus arrhythmia at 63 bpm.     MISCELLANEOUS  1. This was a tape of adequate length (23 hrs).     Since visit of 10/15, place on new antidepressant, Venlafaxine 75 mg daily, tried 2 and developed rapid HR to 125 bpm, feels more anxious, now switched to Citalopram. Also noted the daily dose of Lorazepam does not seem adequate. Orthostatic VS is positive with lying 142/73, , standing 120/62, . Been taking spironolactone 25 mg for last 3 days. Does feel thirsty. Labs reviewed A1C 5.8%, CBC, BMP were WNL. Lipid shows LDL 99, non-. Goal preferred is <70 and < 100. No problem with 10 mg of Crestor. Had vacation at [place], walked all over without problem.    Since visit of 1/18/2016, no new problem. 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 Labs with , non-, hsCRP at 2.56.     In 10/2016, had acute GB attack with rupture in 8/2016, then tried on Wellbutrin took only 1-2 tabs and BP up to 201/100 with head tightness. Subsequently  "back to normal, the last 2.5 days took Losartan bid. Discussed Rx options for HTN. Advised to be more active, regular exercise. Lab reviewed LDL in 8/2016 93.     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 Still with daily palpitations, last up to half hours. Have electronic watch, David Barroso, since 9/2016, suppose harmonize patient with the universe. Feeling better if on during the day. Lot of walking at school, no problem.   Holter in 10/2016 - PREDOMINANT RHYTHM  1. Sinus rhythm with heart rates varying between 52 and 144 bpm with an average of 75 bpm.     2. Paroxysmal atrial fibrillation    VENTRICULAR ARRHYTHMIAS  1. There were occasional mostly monomorphic PVCs totalling 596 and averaging 13 per hour.     2. There were no episodes of ventricular tachycardia.    SUPRA VENTRICULAR ARRHYTHMIAS  1. There were frequent PACs totalling 2982 and averaging 69 per hour.  There were 69 bigeminal cycles.  There were 55 couplets.    2. There were no episodes of sustained supraventricular tachycardia.    3. Paroxysmal atrial fibrillation was noted in the the study.     SINUS NODE FUNCTION  1. There was no evidence of high grade SA khai block.     AV CONDUCTION  1. There was no evidence of high grade AV khai filtering.     2. The longest RR interval was 1725 msec.     DIARY  1. The diary was returned, but not completed    MISCELLANEOUS  1. This was a tape of adequate length (43 hrs).     ECHO CONCLUSIONS     1 - Hyperdynamic left ventricular systolic function (EF 65-70%).     2 - Normal left ventricular diastolic function.     3 - Normal right ventricular systolic function .     4 - Pulmonary hypertension. The estimated PA systolic pressure is 64 mmHg.     5 - Less evidence for LVH from Echo in 11/2014.     In 5/2017, bothered by recurrent PAF with AVILA after 10 feet walking. Felt better before on Flecainide 100 mg bid, but Holter continued to show PAF. Attempt up titration, problem with itching, switched to " "propafenone 150 mg tid, continued with itching and reviewed by allergist, treated with 10 days of cortisone with benefit. No hive and no itching, just don't feel good due to the irregular rhythm. History reviewed, had 2 prior AF ablation, last in [place], [place] in around 2000, recall being told not to do again. Recall seeing an [facility] EP doc but didn't like him. Discussed various options. Will stop antiarrhythmic for now, will be going on a 16 days trip to [place]. Reviewed prior medications, have taken Tenormin, metoprolol tartrate, and short-acting diltiazem in the past without problem. Refer to Dr. Calderon for review. ECG in AF, rate 99, PRWP, LAFB    In 11/2017, post AF ablation, felt good for a few weeks, noted some SOB and had review with Dr. Calderon on 11/1 - 72 year old female with a longstanding history of atrial arrhythmias. Her [id number]-MDJe Score is 5 (age, female, TIA, HTN) so she should remain on anticoagulation indefinitely. She has failed Flecainide. She is now 6 weeks post-PVI and MA flutter line, and maintaining sinus rhythm on low-dose Amiodarone. Given her intermittent AVILA which started post-ablation, I have stopped Amiodarone which I think is the likely culprit. I instructed her to continue taking Lasix PRN, as well as metoprolol and Xarelto." ECG on 11/1 was in NSR, rate 61, PRWP.  Recommended to stop amiodarone but then started to feel the palpitation daily, felt nervous and at the same time being taper down on the nightly Ativan. Did have some breakthrough anxiety attacks. Also had strained the upper back and right arm / shoulder, requesting some Tramadol, tried Jan's Tramadol with good relief. Understand this is an opoid. Used Excedrin with caffeine and bothered by more palpitations.        ROS    Constitutional: negative for chills, fatigue, fevers, sweats, no further slurred speech, and no dysarthria, appetite improved, intolerance to heat, bruises easily on NOAC  Eyes: negative for " "icterus, redness and visual disturbance, have visual halo, had a bleed in the right Iris  Respiratory: negative for asthma, cough have resolved off ACE-I, have dyspnea on exertion, occasional SOB with HR 85 to 100 bpm, have lifelong snoring, [place] score 7 improved down to 3, no hemoptysis, pleurisy/chest pain and wheezing  Cardiovascular: negative for chest pain, chest pressure/discomfort, no further orthostatic dizziness, still with palpitations, no paroxysmal nocturnal dyspnea and syncope  Gastrointestinal: negative for abdominal pain, nausea and vomiting, have GERD  Musculoskeletal:positive for arthralgias, and less right sided muscle weakness with improvement in leg strength, improved bilateral ankle pains - OA and no myalgias  Neurological: negative for coordination problems, dizziness, gait problems, headaches, paresthesia, have some tremors but able to draw and no vertigo  Psych: positive for depression, nervousness, anxiety, less stressed due to 's illness have improved    Objective:    Physical Exam    General appearance: alert, appears stated age, cooperative and no distress, decrease skin turgor.  Head: Normocephalic, without obvious abnormality, atraumatic  Eyes:  conjunctivae/corneas clear. PERRL, EOM's intact.    Neck: no adenopathy, no carotid bruit, no JVD, supple, symmetrical, trachea midline and thyroid not enlarged, symmetric, no tenderness/mass/nodules  Lungs:  clear to auscultation bilaterally  Chest wall: no tenderness  Heart: regular rate and rhythm, normal S1, S2 normal, occasional grade 2/6 systolic murmur, click, rub or gallop    Abdomen: soft, non-tender; bowel sounds normal; no masses,  no organomegaly, waist 31" hip 42"  Extremities: extremities no further weakness of the right upper extremity, atraumatic, no cyanosis and have no edema   Pulses: 2+ and symmetric    Assessment:       1. Paroxysmal atrial fibrillation, ablations X 2 1995, 1997, CHADS-VAS score 5, HAS-BLED score 5  "    2. JOSE (generalized anxiety disorder)    3. Benzodiazepine withdrawal with complication    4. S/P ablation of atrial fibrillation    5. Anticoagulated    6. Adverse effect of caffeine, subsequent encounter    7. Muscle strain         Plan:       Ayla was seen today for follow-up.    Diagnoses and associated orders for this visit:    Paroxysmal atrial fibrillation, ablations X 2 1995, 1997, CHADS-VAS score 5, HAS-BLED score 5   -     amiodarone (PACERONE) 100 MG Tab; Take 1 tablet (100 mg total) by mouth once daily.  Dispense: 90 tablet; Refill: 1    JOSE (generalized anxiety disorder)    Benzodiazepine withdrawal with complication    S/P ablation of atrial fibrillation    Anticoagulated    Adverse effect of caffeine, subsequent encounter    Muscle strain  -     traMADol (ULTRAM) 50 mg tablet; Take 1 tablet (50 mg total) by mouth every 6 (six) hours as needed for Pain.  Dispense: 12 tablet; Refill: 0    - CV status stable, back on antiarrhythmic, all medications reviewed, patient acknowledge good understanding.  - Recommend using Tylenol and or ASA as first line pain medications.  - Start BB for rate control, can take higher dose if rate consistently >110 bpm  - repeat Holter pending  - Phone review / [facility]  - Recommend at least biannual cardiovascular evaluation in view of her significant risk factors.    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 A  would be very helpful.    BMI 24.8 decreased to 20, up 22.5    Chronic pruritus, onset 3/2015 - resolved off Xanax     Depression    Stress at home - improved  - Highly recommend 30 minutes of exercise daily, can have Sunday off, with 2-3 sessions of muscle strengthening weekly. A  would be very helpful.  - Consider Yoga, Gilberto Chi, or meditation    Elevated brain natriuretic peptide (BNP) level,  range 98 - 1045 - improved     Pulmonary hypertension, with right sided congestive heart failure, acute    Chest pain on exertion - resolved    Peripheral edema - imporved    Statin intolerance with Lipitor    Anxiety - imporved    Patient Active Problem List   Diagnosis    Paroxysmal atrial fibrillation, ablations X 2 1995, 1997, CHADS-VAS score 5, HAS-BLED score 5     Hypertension, 1985    Hyperlipidemia, baseline     GERD (gastroesophageal reflux disease)    Glaucoma (increased eye pressure)    Anxiety    Fibroid uterus    Thickened endometrium    Abnormal CT scan, chest    Interstitial lung disease    H/O: CVA (cerebrovascular accident), June 2014    LVH (left ventricular hypertrophy) due to hypertensive disease    Cardiomegaly    BMI 24.8 decreased to 20, up 21.0    Anticoagulated    Depression    AVILA (dyspnea on exertion)    VPC (ventricular premature complex)    OA (osteoarthritis)    Chronic diastolic heart failure, 2014    Elevated brain natriuretic peptide (BNP) level, range 98 - 1045    Microalbuminuria    Hypoalbuminemia    TIA (transient ischemic attack), 11/3/2014    Statin intolerance    Chronic pruritus, onset 3/2015    Hypotension due to drugs    Sinus tachycardia, on Venlafaxine    Sepsis    Acute delirium    Cancer screening    Reflux esophagitis    Colon cancer screening    Atrial flutter    S/P ablation of atrial fibrillation    S/P ablation of atrial flutter    JOSE (generalized anxiety disorder)    Benzodiazepine withdrawal with complication    Caffeine adverse reaction    Muscle strain     Total face-to-face time with the patient was 30 minutes and greater than 50% was spent in counseling and coordination of care. The above assessment and plan have been discussed at length. Labs and procedure over the last 6 months reviewed. Problem List updated. Asked to bring in all active medications / pills bottles with next visit.

## 2017-11-19 ENCOUNTER — HOSPITAL ENCOUNTER (EMERGENCY)
Facility: HOSPITAL | Age: 72
Discharge: HOME | End: 2017-11-19
Attending: EMERGENCY MEDICINE
Payer: MEDICARE

## 2017-11-19 VITALS
DIASTOLIC BLOOD PRESSURE: 77 MMHG | BODY MASS INDEX: 22.44 KG/M2 | WEIGHT: 143 LBS | TEMPERATURE: 97 F | SYSTOLIC BLOOD PRESSURE: 185 MMHG | HEIGHT: 67 IN | HEART RATE: 57 BPM | OXYGEN SATURATION: 97 % | RESPIRATION RATE: 20 BRPM

## 2017-11-19 DIAGNOSIS — M62.830 SPASM OF THORACIC BACK MUSCLE: Primary | ICD-10-CM

## 2017-11-19 PROCEDURE — 25000003 PHARM REV CODE 250: Performed by: EMERGENCY MEDICINE

## 2017-11-19 PROCEDURE — 99283 EMERGENCY DEPT VISIT LOW MDM: CPT

## 2017-11-19 RX ORDER — TIZANIDINE HYDROCHLORIDE 2 MG/1
2 CAPSULE, GELATIN COATED ORAL NIGHTLY PRN
Qty: 12 CAPSULE | Refills: 0 | Status: SHIPPED | OUTPATIENT
Start: 2017-11-19 | End: 2017-11-21

## 2017-11-19 RX ORDER — LIDOCAINE 50 MG/G
1 PATCH TOPICAL
Status: DISCONTINUED | OUTPATIENT
Start: 2017-11-19 | End: 2017-11-19 | Stop reason: HOSPADM

## 2017-11-19 RX ORDER — DICLOFENAC SODIUM 10 MG/G
2 GEL TOPICAL DAILY
Qty: 100 G | Refills: 0 | Status: SHIPPED | OUTPATIENT
Start: 2017-11-19 | End: 2018-01-26

## 2017-11-19 RX ORDER — LIDOCAINE 50 MG/G
1 PATCH TOPICAL
Status: DISCONTINUED | OUTPATIENT
Start: 2017-11-19 | End: 2017-11-19

## 2017-11-19 RX ORDER — LIDOCAINE 50 MG/G
PATCH TOPICAL
Status: DISCONTINUED
Start: 2017-11-19 | End: 2017-11-19 | Stop reason: HOSPADM

## 2017-11-19 RX ADMIN — LIDOCAINE 1 PATCH: 50 PATCH TOPICAL at 10:11

## 2017-11-19 NOTE — ED NOTES
Pt presents to ED with c/o pain to her right upper back x3 weeks. Per the pt the pain began after she lifted heavy books. Tenderness noted to palpation of right upper back. Pt is AAOx4. Skin warm, dry to touch. Respirations even, nonlabored. NAD noted. VSS. Pt ambulates with steady gait. Pt is calm, cooperative, pleasant.  at bedside. Pt denies chest pain, SOB.

## 2017-11-20 NOTE — ED PROVIDER NOTES
Encounter Date: 11/19/2017       History     Chief Complaint   Patient presents with    Back Pain     rt. upper back pain x 3 weeks / no relief with tramadol      Ayla Dela Cruz is a 72 y.o. Female resenting for evaluation of left-sided mid back pain, persisting for the last couple of weeks.  Patient states that she picked up 7 different books at a [employer] earlier this month and began to experience the pain shortly thereafter.  She denies any other injury, trauma or fall associated with the pain.  No numbness, tingling or weakness.  No fever, no chills.  No cough or chest congestion.  No chest pain or palpitations.  No abdominal pain, nausea, vomiting or diarrhea.  She has been evaluated by her primary care provider and has been prescribed medications, with little improvement.      The history is provided by the patient and the spouse.     Review of patient's allergies indicates:   Allergen Reactions    Atorvastatin      Other reaction(s): Joint pain    Augmentin [amoxicillin-pot clavulanate]      Other reaction(s): Mental Status Change    Baclofen (bulk) Nausea And Vomiting    Ciprofloxacin (bulk)     Decongest tabs      Other reaction(s): increased heart rate    Erythromycin Other (See Comments)    Flecainide Hives     And SOB    Fluoxetine      Other reaction(s): heart palpitations  Other reaction(s): anxiety    Lisinopril Other (See Comments)     cough    Morphine Other (See Comments)     confusion    Venlafaxine analogues      Changes in BP and increases heart rate       Afrin [oxymetazoline] Palpitations    Caffeine Palpitations     Past Medical History:   Diagnosis Date    Anticoagulant long-term use     Anxiety     Arthritis     Atrial fibrillation     Cancer     skin    CHF (congestive heart failure)     Depression     DVT (deep venous thrombosis)     GERD (gastroesophageal reflux disease)     Glaucoma (increased eye pressure)     Hyperlipidemia     diet  controlled    Hypertension     Interstitial lung disease     Pneumonia 1/31/2014    Stroke 6-3-14    Stroke     TIA (transient ischemic attack)     TIA (transient ischemic attack)      Past Surgical History:   Procedure Laterality Date    2 heart ablations      bilateral cataracts      CHOLECYSTECTOMY      COLONOSCOPY N/A 1/19/2017    Procedure: COLONOSCOPY and EGD;  Surgeon: Jonathan Schultz MD;  Location: [facility];  Service: Endoscopy;  Laterality: N/A;    pyloristenosis      skin cancer removal       TONSILLECTOMY       Family History   Problem Relation Age of Onset    Heart disease Father     Ulcers Father     Arthritis Mother     Asthma Mother     Rheum arthritis Mother     Pneumonia Mother     Depression Son     Alzheimer's disease Maternal Uncle     Rheum arthritis Maternal Grandmother     Emphysema Maternal Grandfather     Cancer Maternal Grandfather      kidney    Kidney disease Maternal Grandfather     Cancer Paternal Grandmother      lung= smoker    Pneumonia Paternal Grandfather     Breast cancer Neg Hx     Ovarian cancer Neg Hx      Social History   Substance Use Topics    Smoking status: Never Smoker    Smokeless tobacco: Never Used    Alcohol use No     Review of Systems   Constitutional: Negative for chills and fever.   Respiratory: Negative for cough, chest tightness, shortness of breath and wheezing.    Cardiovascular: Negative for chest pain and palpitations.   Musculoskeletal: Positive for arthralgias, back pain and myalgias. Negative for joint swelling, neck pain and neck stiffness.   Skin: Negative for color change, pallor, rash and wound.   Neurological: Negative for weakness and numbness.   Hematological: Does not bruise/bleed easily.       Physical Exam     Initial Vitals   BP Pulse Resp Temp SpO2   11/19/17 1027 11/19/17 1010 11/19/17 1010 11/19/17 1010 11/19/17 1010   (!) 185/77 (!) 57 20 97.4 °F (36.3 °C) 97 %      MAP       11/19/17 1027       113          Physical Exam    Nursing note and vitals reviewed.  Constitutional: She appears well-developed and well-nourished. She is not diaphoretic. No distress.   HENT:   Head: Normocephalic and atraumatic.   Neck: Normal range of motion. Neck supple.   Cardiovascular: Normal rate, regular rhythm, normal heart sounds and intact distal pulses.   No murmur heard.  Pulmonary/Chest: Breath sounds normal. No respiratory distress. She has no wheezes. She has no rhonchi. She has no rales.   Equal, bilateral breath sounds noted without wheezing.   Abdominal: Soft. She exhibits no distension and no mass. There is no tenderness.   No palpable abdominal tenderness noted.   Musculoskeletal: Normal range of motion. She exhibits tenderness. She exhibits no edema.        Thoracic back: She exhibits tenderness, pain and spasm. She exhibits normal range of motion, no bony tenderness, no swelling, no edema, no deformity, no laceration and normal pulse.        Back:    Tenderness to palpation and spasm noted to left-sided thoracic paraspinal muscle.  No midline spinous process tenderness noted.  No decreased range of motion, decreased strength or loss of sensation to bilateral upper or lower extremities.  Palpable 2+ radial and pedal pulses.   Neurological: She is alert and oriented to person, place, and time. She has normal strength. No sensory deficit.   Skin: Skin is warm and dry. No rash and no abscess noted. No erythema.   Psychiatric: She has a normal mood and affect.         ED Course   Procedures  Labs Reviewed - No data to display          Medical Decision Making:   Differential Diagnosis:   Spasm  Fracture  Aortic dissection  Thoracic strain  ACS       APC / Resident Notes:   Her symptoms are most consistent with a thoracic spasm, which we will treat with Lidoderm patch of Voltaren gel and mild muscle relaxer.  Low suspicion for ACS or aortic dissection and no need for further imaging or testing at this time.  She voices  understanding and is agreeable to the plan.  She is given specific return precautions.         Attending Attestation:     Physician Attestation Statement for NP/PA:   I discussed this assessment and plan of this patient with the NP/PA, but I did not personally examine the patient. The face to face encounter was performed by the NP/PA.    Other NP/PA Attestation Additions:    History of Present Illness: 72-year-old female presented with a chief complaint of left-sided back pain.    Medical Decision Making: Initial differential diagnosis included but not limited to musculoskeletal pain, ligamentous injury, and muscle spasm.  I am in agreement with the physician assistant's  assessment, treatment, and plan of care.                   ED Course      Clinical Impression:   The encounter diagnosis was Spasm of thoracic back muscle.                           Temitope Nixon PA-C  11/19/17 221       Lenin Wynn MD  11/20/17 2006

## 2017-11-21 ENCOUNTER — DOCUMENTATION ONLY (OUTPATIENT)
Dept: FAMILY MEDICINE | Facility: CLINIC | Age: 72
End: 2017-11-21

## 2017-11-21 ENCOUNTER — OFFICE VISIT (OUTPATIENT)
Dept: FAMILY MEDICINE | Facility: CLINIC | Age: 72
End: 2017-11-21
Payer: MEDICARE

## 2017-11-21 VITALS
SYSTOLIC BLOOD PRESSURE: 126 MMHG | BODY MASS INDEX: 21.93 KG/M2 | WEIGHT: 139.75 LBS | HEIGHT: 67 IN | HEART RATE: 66 BPM | DIASTOLIC BLOOD PRESSURE: 68 MMHG | TEMPERATURE: 98 F

## 2017-11-21 DIAGNOSIS — M54.9 TRIGGER POINT WITH BACK PAIN: ICD-10-CM

## 2017-11-21 DIAGNOSIS — M62.830 MUSCLE SPASM OF BACK: Primary | ICD-10-CM

## 2017-11-21 PROCEDURE — 99999 PR PBB SHADOW E&M-EST. PATIENT-LVL III: CPT | Mod: PBBFAC,,, | Performed by: PHYSICIAN ASSISTANT

## 2017-11-21 PROCEDURE — 99214 OFFICE O/P EST MOD 30 MIN: CPT | Mod: S$PBB,,, | Performed by: PHYSICIAN ASSISTANT

## 2017-11-21 PROCEDURE — 99213 OFFICE O/P EST LOW 20 MIN: CPT | Mod: PBBFAC,PO | Performed by: PHYSICIAN ASSISTANT

## 2017-11-21 NOTE — PROGRESS NOTES
Subjective:       Patient ID: Ayla Dela Cruz is a 72 y.o. female.    Chief Complaint: Hospital Follow Up    HPI   Patient is a 72 year old  female presenting to the clinic for ER f/u spasm of left thoracic paraspinal muscle. Patient reports pain is slowly improving, but only with the aid of prednisone 20mg daily (given to her by her dermatologist). At discharge from ER, she was given voltaren gel & Zanaflex. She could not tolerate the Zanaflex & has asked us to add this to her allergy list.  Review of Systems   Constitutional: Negative for activity change, appetite change, chills, diaphoresis, fatigue and fever.   HENT: Negative for congestion, postnasal drip and rhinorrhea.    Respiratory: Negative.  Negative for cough, shortness of breath and wheezing.    Cardiovascular: Negative.  Negative for chest pain.   Gastrointestinal: Negative for abdominal pain, blood in stool, constipation, diarrhea, nausea and vomiting.   Genitourinary: Negative for dysuria, frequency, hematuria and urgency.   Musculoskeletal: Positive for back pain and myalgias. Negative for neck pain and neck stiffness.   Skin: Negative.  Negative for color change and rash.   Neurological: Negative for dizziness and syncope.   Psychiatric/Behavioral: Negative for agitation, behavioral problems and confusion.       Objective:      Physical Exam   Constitutional: Vital signs are normal. She appears well-developed and well-nourished. No distress.   Cardiovascular: Normal rate, regular rhythm, S1 normal, S2 normal and normal heart sounds.  Exam reveals no gallop.    No murmur heard.  Pulses:       Radial pulses are 2+ on the right side, and 2+ on the left side.   <2sec cap refill fingers bilat     Pulmonary/Chest: Effort normal and breath sounds normal. No respiratory distress. She has no wheezes. She has no rhonchi.   Musculoskeletal:        Thoracic back: She exhibits tenderness and bony tenderness.        Back:    Skin: Skin is warm  and dry. She is not diaphoretic.   Appropriate skin turgor   Psychiatric: She has a normal mood and affect. Her speech is normal and behavior is normal. Judgment and thought content normal. Cognition and memory are normal.       Assessment:       1. Muscle spasm of back    2. Trigger point with back pain        Plan:   Ayla was seen today for hospital follow up.    Diagnoses and all orders for this visit:    Muscle spasm of back  Continue prednisone as directed via dermatology  Continue voltaren gel TID prn  -     Ambulatory referral to Physical Medicine Rehab    Trigger point with back pain  -     Ambulatory referral to Physical Medicine Rehab

## 2017-11-21 NOTE — PROGRESS NOTES
Pre-Visit Chart Review  For Appointment Scheduled on 11/21/17    Health Maintenance Due   Topic Date Due    TETANUS VACCINE  03/12/1963    Zoster Vaccine  03/12/2003

## 2017-12-15 ENCOUNTER — TELEPHONE (OUTPATIENT)
Dept: PHYSICAL MEDICINE AND REHAB | Facility: CLINIC | Age: 72
End: 2017-12-15

## 2017-12-16 NOTE — TELEPHONE ENCOUNTER
Please contact patient to reschedule appointment that was cancelled from Dr Grubbs's schedule on Monday due to her being out. Thanks.

## 2017-12-16 NOTE — TELEPHONE ENCOUNTER
----- Message from Ivon Diaz sent at 12/15/2017 10:50 AM CST -----  Please call [phone] / Jan Thakkar returning call about  requesting to reschedule Monday's appt

## 2017-12-19 NOTE — TELEPHONE ENCOUNTER
Spoke with pt and they have been to see a chiropractor who has given her much relief and no longer needs the appt with Dr. Grubbs.  She thanked me for calling.

## 2017-12-21 ENCOUNTER — TELEPHONE (OUTPATIENT)
Dept: PHYSICAL MEDICINE AND REHAB | Facility: CLINIC | Age: 72
End: 2017-12-21

## 2017-12-21 ENCOUNTER — HOSPITAL ENCOUNTER (OUTPATIENT)
Dept: RADIOLOGY | Facility: CLINIC | Age: 72
Discharge: HOME | End: 2017-12-21
Attending: PHYSICAL MEDICINE & REHABILITATION
Payer: MEDICARE

## 2017-12-21 ENCOUNTER — OFFICE VISIT (OUTPATIENT)
Dept: PHYSICAL MEDICINE AND REHAB | Facility: CLINIC | Age: 72
End: 2017-12-21
Payer: MEDICARE

## 2017-12-21 VITALS
HEART RATE: 58 BPM | SYSTOLIC BLOOD PRESSURE: 186 MMHG | WEIGHT: 138.44 LBS | DIASTOLIC BLOOD PRESSURE: 85 MMHG | BODY MASS INDEX: 21.73 KG/M2 | HEIGHT: 67 IN

## 2017-12-21 DIAGNOSIS — M54.12 CERVICAL RADICULITIS: ICD-10-CM

## 2017-12-21 DIAGNOSIS — M54.12 CERVICAL RADICULITIS: Primary | ICD-10-CM

## 2017-12-21 PROCEDURE — 99999 PR PBB SHADOW E&M-EST. PATIENT-LVL III: CPT | Mod: PBBFAC,,, | Performed by: PHYSICAL MEDICINE & REHABILITATION

## 2017-12-21 PROCEDURE — 72040 X-RAY EXAM NECK SPINE 2-3 VW: CPT | Mod: TC,PO

## 2017-12-21 PROCEDURE — 99213 OFFICE O/P EST LOW 20 MIN: CPT | Mod: PBBFAC,25,PN | Performed by: PHYSICAL MEDICINE & REHABILITATION

## 2017-12-21 PROCEDURE — 72040 X-RAY EXAM NECK SPINE 2-3 VW: CPT | Mod: 26,,, | Performed by: RADIOLOGY

## 2017-12-21 PROCEDURE — 71100 X-RAY EXAM RIBS UNI 2 VIEWS: CPT | Mod: 26,,, | Performed by: RADIOLOGY

## 2017-12-21 PROCEDURE — 71100 X-RAY EXAM RIBS UNI 2 VIEWS: CPT | Mod: TC,PO

## 2017-12-21 PROCEDURE — 99204 OFFICE O/P NEW MOD 45 MIN: CPT | Mod: S$PBB,,, | Performed by: PHYSICAL MEDICINE & REHABILITATION

## 2017-12-21 RX ORDER — PREDNISONE 5 MG/1
5 TABLET ORAL DAILY
Qty: 10 TABLET | Refills: 0 | Status: SHIPPED | OUTPATIENT
Start: 2017-12-21 | End: 2020-04-09 | Stop reason: SDUPTHER

## 2017-12-21 RX ORDER — GABAPENTIN 300 MG/1
300 CAPSULE ORAL NIGHTLY
Qty: 30 CAPSULE | Refills: 2 | Status: SHIPPED | OUTPATIENT
Start: 2017-12-21 | End: 2017-12-22

## 2017-12-21 NOTE — PROGRESS NOTES
HPI:  Patient is a 72 y.o. year old female w. Pain behind her right scapula and right shoulder. This started 7 days ago. She has been on prednisone for a rash and she is weaning off prednisone. Her pain seems to have worsened since. It is not related to specific movements.    Imaging  Comparison:10/31/11    Technique: DXA scanning was performed over the left hip and lumbar spine.  Review of the images confirms satisfactory positioning and technique.    Findings: The L1 to L4 vertebral bone mineral density is equal to 1.18 g/cm squared with a T score of 1.2 and a Z score of 3.6.    The left femoral neck bone mineral density is equal to 0.74 g/cm squared with a T score of -1.0 and a Z score of 1.0.   Impression      Normal bone mineral density of the lumbar spine and borderline osteopenia of the left hip.  Left hip bone mineral density has decreased when compared to the 10/31/11 examination -- there is an approximately 13% risk of a major osteoporotic fracture and a 3.7% risk of hip fracture in the next 10 years (FRAX).         Labs  Egfr, cr, lfts nl  Gluc elev    Past Medical History:   Diagnosis Date    Anticoagulant long-term use     Anxiety     Arthritis     Atrial fibrillation     Cancer     skin    CHF (congestive heart failure)     Depression     DVT (deep venous thrombosis)     GERD (gastroesophageal reflux disease)     Glaucoma (increased eye pressure)     Hyperlipidemia     diet controlled    Hypertension     Interstitial lung disease     Pneumonia 1/31/2014    Stroke 6-3-14    Stroke     TIA (transient ischemic attack)     TIA (transient ischemic attack)      Past Surgical History:   Procedure Laterality Date    2 heart ablations      bilateral cataracts      CHOLECYSTECTOMY      COLONOSCOPY N/A 1/19/2017    Procedure: COLONOSCOPY and EGD;  Surgeon: Jonathan Schultz MD;  Location: [facility];  Service: Endoscopy;  Laterality: N/A;    pyloristenosis      skin cancer removal        TONSILLECTOMY       Family History   Problem Relation Age of Onset    Heart disease Father     Ulcers Father     Arthritis Mother     Asthma Mother     Rheum arthritis Mother     Pneumonia Mother     Depression Son     Alzheimer's disease Maternal Uncle     Rheum arthritis Maternal Grandmother     Emphysema Maternal Grandfather     Cancer Maternal Grandfather      kidney    Kidney disease Maternal Grandfather     Cancer Paternal Grandmother      lung= smoker    Pneumonia Paternal Grandfather     Breast cancer Neg Hx     Ovarian cancer Neg Hx      Social History     Social History    Marital status:      Spouse name: N/A    Number of children: N/A    Years of education: N/A     Social History Main Topics    Smoking status: Never Smoker    Smokeless tobacco: Never Used    Alcohol use No    Drug use: No    Sexual activity: Yes     Partners: Male     Other Topics Concern    Not on file     Social History Narrative    No narrative on file       Review of patient's allergies indicates:   Allergen Reactions    Atorvastatin      Other reaction(s): Joint pain    Augmentin [amoxicillin-pot clavulanate]      Other reaction(s): Mental Status Change    Baclofen (bulk) Nausea And Vomiting    Ciprofloxacin (bulk)     Decongest tabs      Other reaction(s): increased heart rate    Erythromycin Other (See Comments)    Flecainide Hives     And SOB    Fluoxetine      Other reaction(s): heart palpitations  Other reaction(s): anxiety    Lisinopril Other (See Comments)     cough    Morphine Other (See Comments)     confusion    Venlafaxine analogues      Changes in BP and increases heart rate       Afrin [oxymetazoline] Palpitations    Caffeine Palpitations    Tizanidine Anxiety     dizziness       Current Outpatient Prescriptions:     amiodarone (PACERONE) 100 MG Tab, Take 1 tablet (100 mg total) by mouth once daily., Disp: 90 tablet, Rfl: 1    ammonium lactate (LAC-HYDRIN) 12 % lotion, ,  Disp: , Rfl:     ATROPINE SULFATE, PF, OPHT, Apply to eye., Disp: , Rfl:     clobetasol (TEMOVATE) 0.05 % cream, APPLY TWICE DAILY TO RASH UP TO 2 WEEKS/MONTH AS NEEDED., Disp: , Rfl: 3    diclofenac sodium (VOLTAREN) 1 % Gel, Apply 2 g topically once daily., Disp: 100 g, Rfl: 0    diflunisal (DOLOBID) 500 mg Tab, Take 1 tablet (500 mg total) by mouth 2 (two) times daily., Disp: 90 tablet, Rfl: 1    dorzolamide-timolol (COSOPT) 2-0.5 % ophthalmic solution, Place 1 drop into both eyes 2 (two) times daily. Twice a day, Disp: , Rfl:     furosemide (LASIX) 20 MG tablet, Take 1 tablet (20 mg total) by mouth once daily. As needed, Disp: 30 tablet, Rfl: 3    lorazepam (ATIVAN) 1 MG tablet, TAKE 1 TABLET BY MOUTH DAILY, Disp: 30 tablet, Rfl: 3    losartan (COZAAR) 100 MG tablet, TAKE 1 TABLET (100 MG TOTAL) BY MOUTH ONCE DAILY., Disp: 90 tablet, Rfl: 2    metoprolol tartrate (LOPRESSOR) 50 MG Tab, Take 1 tablet (50 mg total) by mouth 2 (two) times daily., Disp: 90 tablet, Rfl: 4    pantoprazole (PROTONIX) 40 MG tablet, Take 1 tablet (40 mg total) by mouth once daily., Disp: 90 tablet, Rfl: 3    predniSONE (DELTASONE) 20 MG tablet, Take 20 mg by mouth every evening., Disp: , Rfl:     rosuvastatin (CRESTOR) 40 MG Tab, TAKE 1 TABLET (40 MG TOTAL) BY MOUTH EVERY EVENING., Disp: 90 tablet, Rfl: 3    VIIBRYD 40 mg Tab tablet, Take 40 mg by mouth once daily., Disp: , Rfl: 2    XARELTO 20 mg Tab, TAKE 1 TABLET (20 MG TOTAL) BY MOUTH DAILY WITH DINNER OR EVENING MEAL., Disp: 30 tablet, Rfl: 6      Review of Systems:  No nausea, vomiting, fevers, chills , contipation, diarrhea or sweats,no weight change, no neck stiffness, no chest pain, no sob, no change of bowel or bladder habits,no coordination issues        Physical Exam:      Vitals:    12/21/17 1229   BP: (!) 186/85   Pulse: (!) 58     alert and oriented ×4 follows commands answers all questions appropriately,affect wnl  Manual muscle test 5 out of 5 sensation to  light touch grossly intact  + tenderness right rib cage  +circular pustular rash (however  states they are seeing dermatology and rash moves , does not stay localized)  Nl gait  -spurlings  -[place] -neers  Full shoulder ROM  babinsky down  No clonus  DTR's symmetric 2+  No C/C/E  No musc atrophy    Assessment:  Cervical radic  Rib cage pain likely referred pain from cervical radic rather than separate problem  Rash- although rash currently appears like shingles pt's  states if spreads to different areas and it is being followed by derm  Rheumatoid arthtitis followed by   Plan:  Trial of neurontin  Cervical spine xray  Rib cage xray  Low dose prednisone  Consider emg/ncs vs mri of cspine if no impovement    .Thank you for this interesting referral

## 2017-12-21 NOTE — TELEPHONE ENCOUNTER
----- Message from Alona Jansen sent at 12/21/2017 11:14 AM CST -----  Contact: patient  Patient called requesting same day appointment,per Tanya patient could come in at 1:40 pm today. Thanks,

## 2017-12-22 ENCOUNTER — TELEPHONE (OUTPATIENT)
Dept: PHYSICAL MEDICINE AND REHAB | Facility: CLINIC | Age: 72
End: 2017-12-22

## 2017-12-22 ENCOUNTER — PATIENT MESSAGE (OUTPATIENT)
Dept: PHYSICAL MEDICINE AND REHAB | Facility: CLINIC | Age: 72
End: 2017-12-22

## 2017-12-22 DIAGNOSIS — M54.12 CERVICAL RADICULITIS: Primary | ICD-10-CM

## 2017-12-22 RX ORDER — GABAPENTIN 100 MG/1
100 CAPSULE ORAL NIGHTLY
Qty: 30 CAPSULE | Refills: 11 | Status: SHIPPED | OUTPATIENT
Start: 2017-12-22 | End: 2018-01-26

## 2017-12-22 NOTE — TELEPHONE ENCOUNTER
----- Message from Danielle Aguilar sent at 12/22/2017  1:34 PM CST -----  Contact: self  Patient called asking for advice about the fracture in her back. Patient has more questions. Patient would like a call from Tanya.  Please call patient at [phone]. Thanks!

## 2017-12-22 NOTE — TELEPHONE ENCOUNTER
Cont taking neurontin. If no improvement I have placed referral  For DR. Clancy for possible epidural. Give neurontin 4 wks for it to work. If pain gets worse and need to get epidural sooner make appt. Sooner w. Pain management.

## 2017-12-22 NOTE — TELEPHONE ENCOUNTER
Spoke with pt and answered her questions and scheduled the MRI for her. Pt v/u to all questions and thanked me for calling.

## 2017-12-26 ENCOUNTER — TELEPHONE (OUTPATIENT)
Dept: PHYSICAL MEDICINE AND REHAB | Facility: CLINIC | Age: 72
End: 2017-12-26

## 2017-12-26 NOTE — TELEPHONE ENCOUNTER
Spoke with pt and informed her to go ahead and at least schedule an appt with Dr. Clancy just so she has a spot booked, and she can always cancel if it is determined she doesn't need the appt after the MRI.  She v/u and thanked me for calling.

## 2017-12-26 NOTE — TELEPHONE ENCOUNTER
----- Message from Pamela Caballero sent at 12/26/2017 12:42 PM CST -----  Contact: self   Patient want to speak with a nurse regarding a pinch nerve in her back please call back at [phone]

## 2017-12-27 ENCOUNTER — HOSPITAL ENCOUNTER (OUTPATIENT)
Dept: RADIOLOGY | Facility: CLINIC | Age: 72
Discharge: HOME | End: 2017-12-27
Attending: OBSTETRICS & GYNECOLOGY
Payer: MEDICARE

## 2017-12-27 DIAGNOSIS — Z12.31 VISIT FOR SCREENING MAMMOGRAM: ICD-10-CM

## 2017-12-27 PROCEDURE — 77063 BREAST TOMOSYNTHESIS BI: CPT | Mod: 26,,, | Performed by: RADIOLOGY

## 2017-12-27 PROCEDURE — 77067 SCR MAMMO BI INCL CAD: CPT | Mod: 26,,, | Performed by: RADIOLOGY

## 2017-12-27 PROCEDURE — 77067 SCR MAMMO BI INCL CAD: CPT | Mod: TC,PO

## 2017-12-28 ENCOUNTER — HOSPITAL ENCOUNTER (OUTPATIENT)
Dept: RADIOLOGY | Facility: HOSPITAL | Age: 72
Discharge: HOME | End: 2017-12-28
Attending: PHYSICAL MEDICINE & REHABILITATION
Payer: MEDICARE

## 2017-12-28 ENCOUNTER — TELEPHONE (OUTPATIENT)
Dept: OBSTETRICS AND GYNECOLOGY | Facility: CLINIC | Age: 72
End: 2017-12-28

## 2017-12-28 ENCOUNTER — PATIENT MESSAGE (OUTPATIENT)
Dept: OBSTETRICS AND GYNECOLOGY | Facility: CLINIC | Age: 72
End: 2017-12-28

## 2017-12-28 DIAGNOSIS — R92.8 ABNORMAL MAMMOGRAM: Primary | ICD-10-CM

## 2017-12-28 DIAGNOSIS — M54.12 CERVICAL RADICULITIS: ICD-10-CM

## 2017-12-28 PROCEDURE — 72141 MRI NECK SPINE W/O DYE: CPT | Mod: 26,,, | Performed by: RADIOLOGY

## 2017-12-28 PROCEDURE — 72141 MRI NECK SPINE W/O DYE: CPT | Mod: TC

## 2017-12-29 ENCOUNTER — TELEPHONE (OUTPATIENT)
Dept: PHYSICAL MEDICINE AND REHAB | Facility: CLINIC | Age: 72
End: 2017-12-29

## 2017-12-29 NOTE — TELEPHONE ENCOUNTER
----- Message from Yesenia Whitten sent at 12/29/2017  1:22 PM CST -----  Contact: self  Would like MRI results. Please call back at

## 2017-12-29 NOTE — TELEPHONE ENCOUNTER
Severe arthritis of the neck causing narrowing around the spinal canal and around the nerves that go to her arms- cont neurontin may need an epidural in neck if this does not work. We can also increase dose

## 2017-12-29 NOTE — TELEPHONE ENCOUNTER
Spoke with pt and informed of MRI results and Dr. Grubbs's notes regarding possible epidural if gabapentin doesn't continue to work and also based on her pain. The pt v/u and thanked me for calling and explaining this to them and answering their questions.

## 2018-01-09 ENCOUNTER — HOSPITAL ENCOUNTER (OUTPATIENT)
Dept: RADIOLOGY | Facility: HOSPITAL | Age: 73
Discharge: HOME | End: 2018-01-09
Attending: OBSTETRICS & GYNECOLOGY
Payer: MEDICARE

## 2018-01-09 DIAGNOSIS — R92.8 ABNORMAL MAMMOGRAM: ICD-10-CM

## 2018-01-09 PROCEDURE — 77065 DX MAMMO INCL CAD UNI: CPT | Mod: 26,LT,, | Performed by: RADIOLOGY

## 2018-01-09 PROCEDURE — 77061 BREAST TOMOSYNTHESIS UNI: CPT | Mod: 26,LT,, | Performed by: RADIOLOGY

## 2018-01-09 PROCEDURE — 76642 ULTRASOUND BREAST LIMITED: CPT | Mod: TC,LT

## 2018-01-09 PROCEDURE — 77061 BREAST TOMOSYNTHESIS UNI: CPT | Mod: TC,LT

## 2018-01-09 PROCEDURE — 76642 ULTRASOUND BREAST LIMITED: CPT | Mod: 26,LT,, | Performed by: RADIOLOGY

## 2018-01-09 PROCEDURE — 77065 DX MAMMO INCL CAD UNI: CPT | Mod: TC,LT

## 2018-01-10 ENCOUNTER — TELEPHONE (OUTPATIENT)
Dept: PHYSICAL MEDICINE AND REHAB | Facility: CLINIC | Age: 73
End: 2018-01-10

## 2018-01-10 NOTE — TELEPHONE ENCOUNTER
Spoke pt and discussed her scheduling an appt with Dr. Clancy since we are unable to do the EMG on her at this time due to the machine not working.  She v/u and thanked me for calling.

## 2018-01-10 NOTE — TELEPHONE ENCOUNTER
----- Message from Lauren Peña sent at 1/10/2018 10:28 AM CST -----  Contact: Self  Patient is requesting an appt access, please call back at [phone]. Thanks

## 2018-01-16 ENCOUNTER — DOCUMENTATION ONLY (OUTPATIENT)
Dept: FAMILY MEDICINE | Facility: CLINIC | Age: 73
End: 2018-01-16

## 2018-01-16 NOTE — PROGRESS NOTES
Pre-Visit Chart Review  For Appointment Scheduled on 01/17/18    Health Maintenance Due   Topic Date Due    TETANUS VACCINE  03/12/1963    Zoster Vaccine  03/12/2003

## 2018-01-17 ENCOUNTER — OFFICE VISIT (OUTPATIENT)
Dept: RHEUMATOLOGY | Facility: CLINIC | Age: 73
End: 2018-01-17
Payer: MEDICARE

## 2018-01-17 ENCOUNTER — PATIENT MESSAGE (OUTPATIENT)
Dept: FAMILY MEDICINE | Facility: CLINIC | Age: 73
End: 2018-01-17

## 2018-01-17 VITALS
BODY MASS INDEX: 21.82 KG/M2 | SYSTOLIC BLOOD PRESSURE: 138 MMHG | HEIGHT: 67 IN | DIASTOLIC BLOOD PRESSURE: 80 MMHG | WEIGHT: 139 LBS

## 2018-01-17 DIAGNOSIS — M15.9 OSTEOARTHRITIS, GENERALIZED: Primary | ICD-10-CM

## 2018-01-17 PROCEDURE — 99213 OFFICE O/P EST LOW 20 MIN: CPT | Mod: ,,, | Performed by: INTERNAL MEDICINE

## 2018-01-17 NOTE — PROGRESS NOTES
[facility]            PROGRESS NOTE      Subjective:       Patient ID:   NAME: Ayla Dela Cruz : 1945     72 y.o. female    Referring Doc: No ref. provider found  Other Physicians:    Chief Complaint:  No chief complaint on file.      History of Present Illness:     Patient returns today for a regularly scheduled follow-up visit for Osteoarthritis.      The patient is doing well overall except for right posterior neck pain for the past few months. Denies paresthesias. No shoulder pain. No history of trauma. Pain is aggravated with certain activities like lifting  No other joint complaints.  She had MRI done of her cervical spine and is scheduled to receive epidural injection next week.          ROS:   GEN:  No  fever, night sweats . weight is stable   No  fatigue  SKIN: no rashes, no bruising, no ulcerations, no Raynaud's  HEENT: no HA's, No visual changes, no mucosal ulcers, no sicca symptoms,  CV:   no CP, SOB, PND, AVILA, no orthopnea, no palpitations  PULM: normal with no SOB, cough, hemoptysis, sputum or pleuritic pain  GI:  no abdominal pain, nausea, vomiting, constipation, diarrhea, melanotic stools, BRBPR, hematemesis, no dysphagia  :   no dysuria  NEURO: no paresthesias, headaches, visual disturbances, muscle weakness  MUSCULOSKELETAL:no joint swelling, prolonged AM stiffness, no back pain, +muscle pain  Allergies:  Review of patient's allergies indicates:   Allergen Reactions    Atorvastatin      Other reaction(s): Joint pain    Augmentin [amoxicillin-pot clavulanate]      Other reaction(s): Mental Status Change    Baclofen (bulk) Nausea And Vomiting    Ciprofloxacin (bulk)     Decongest tabs      Other reaction(s): increased heart rate    Erythromycin Other (See Comments)    Flecainide Hives     And SOB    Fluoxetine      Other reaction(s): heart palpitations  Other reaction(s): anxiety    Lisinopril Other (See Comments)     cough    Morphine Other (See Comments)      "confusion    Venlafaxine analogues      Changes in BP and increases heart rate       Afrin [oxymetazoline] Palpitations    Caffeine Palpitations    Tizanidine Anxiety     dizziness       Medications:    Current Outpatient Prescriptions:     ammonium lactate (LAC-HYDRIN) 12 % lotion, , Disp: , Rfl:     ATROPINE SULFATE, PF, OPHT, Apply to eye., Disp: , Rfl:     clobetasol (TEMOVATE) 0.05 % cream, APPLY TWICE DAILY TO RASH UP TO 2 WEEKS/MONTH AS NEEDED., Disp: , Rfl: 3    diclofenac sodium (VOLTAREN) 1 % Gel, Apply 2 g topically once daily., Disp: 100 g, Rfl: 0    dorzolamide-timolol (COSOPT) 2-0.5 % ophthalmic solution, Place 1 drop into both eyes 2 (two) times daily. Twice a day, Disp: , Rfl:     gabapentin (NEURONTIN) 100 MG capsule, Take 1 capsule (100 mg total) by mouth every evening., Disp: 30 capsule, Rfl: 11    lorazepam (ATIVAN) 1 MG tablet, TAKE 1 TABLET BY MOUTH DAILY, Disp: 30 tablet, Rfl: 3    losartan (COZAAR) 100 MG tablet, TAKE 1 TABLET (100 MG TOTAL) BY MOUTH ONCE DAILY., Disp: 90 tablet, Rfl: 2    metoprolol tartrate (LOPRESSOR) 50 MG Tab, Take 1 tablet (50 mg total) by mouth 2 (two) times daily., Disp: 90 tablet, Rfl: 4    pantoprazole (PROTONIX) 40 MG tablet, Take 1 tablet (40 mg total) by mouth once daily., Disp: 90 tablet, Rfl: 3    rosuvastatin (CRESTOR) 40 MG Tab, TAKE 1 TABLET (40 MG TOTAL) BY MOUTH EVERY EVENING., Disp: 90 tablet, Rfl: 3    VIIBRYD 40 mg Tab tablet, Take 40 mg by mouth once daily., Disp: , Rfl: 2    XARELTO 20 mg Tab, TAKE 1 TABLET (20 MG TOTAL) BY MOUTH DAILY WITH DINNER OR EVENING MEAL., Disp: 30 tablet, Rfl: 6    PMHx/PSHx Updates:          No results found for this or any previous visit.  Last Eye Exam      Objective:     Vitals:  Blood pressure 138/80, height 5' 7" (1.702 m), weight 63 kg (139 lb).    Physical Examination:   GEN: no apparent distress, comfortable; AAOx3  SKIN: no rashes,no ulceration, no Raynaud's, no petechiae, no SQ nodules,  HEAD: " normal  EYES: no pallor, no icterus  ENT:  ,no mucosal dryness or ulcerations  NECK: no masses, thyroid normal, trachea midline, no LAD/LN's, supple  CV: RRR with no murmur; l S1 and S2 reg. ,no gallop no rubs,   CHEST: Normal respiratory effort; CTAB; normal breath sounds; no wheeze or crackles  MUSC/Skeletal: ROM normal; no crepitus; joints without synovitis,  no deformities  No joint swelling or tenderness of PIP, MCP, wrist, elbow, shoulder, or knee joints.Tenderness on right trapezius  EXTREM: no clubbing, cyanosis, no edema,normal  pulses   NEURO: grossly intact; motor WNL; AAOx3;   PSYCH: normal mood, affect and behavior  LYMPH: normal cervical, supraclavicular          Labs:   Lab Results   Component Value Date    WBC 13.95 (H) 09/05/2017    HGB 12.4 09/05/2017    HCT 39.7 09/05/2017    MCV 91 09/05/2017     09/05/2017    CMP  @LASTLAB(NA,K,CL,CO2,GLU,BUN,Creatinine,Calcium,PROT,Albumin,Bilitot,Alkphos,AST,ALT,CRP,ESR,RF,CCP,LAWRENCE,SSA,CPK,uric acid) )@  I have reviewed all available lab results and radiology reports.    Radiology/Diagnostic Studies:  MRI report of the cervical spine reviewed. DJD changes no herniated disks      Assessment/Plan:   (1) 72 y.o. female with diagnosis of Osteoarthritis. She is stable.  She has right posterior neck pain and will have epidural injection next week as per patient  DEXA Bone Density is  up-to-date.              Discussion:     I have explained all of the above in detail and the patient understands all of the current recommendation(s). I have answered all questions to the best of my ability and to their complete satisfaction.       The patient is to continue with the current management plan         RTC in   6 months or before if needed      Electronically signed by Edmond Pak MD

## 2018-01-17 NOTE — TELEPHONE ENCOUNTER
Please reschedule mika and sonu lea for their annuals. They were on my schedule today. This is no urgency, we can reschedule them a few weeks out.

## 2018-01-22 ENCOUNTER — OFFICE VISIT (OUTPATIENT)
Dept: PAIN MEDICINE | Facility: CLINIC | Age: 73
End: 2018-01-22
Payer: MEDICARE

## 2018-01-22 ENCOUNTER — TELEPHONE (OUTPATIENT)
Dept: PAIN MEDICINE | Facility: CLINIC | Age: 73
End: 2018-01-22

## 2018-01-22 VITALS
HEART RATE: 62 BPM | WEIGHT: 139 LBS | SYSTOLIC BLOOD PRESSURE: 152 MMHG | HEIGHT: 67 IN | BODY MASS INDEX: 21.82 KG/M2 | DIASTOLIC BLOOD PRESSURE: 71 MMHG

## 2018-01-22 DIAGNOSIS — M50.30 DDD (DEGENERATIVE DISC DISEASE), CERVICAL: Primary | ICD-10-CM

## 2018-01-22 DIAGNOSIS — M79.10 MYALGIA: ICD-10-CM

## 2018-01-22 DIAGNOSIS — M54.12 CERVICAL RADICULOPATHY: Primary | ICD-10-CM

## 2018-01-22 DIAGNOSIS — M54.12 CERVICAL RADICULITIS: ICD-10-CM

## 2018-01-22 PROCEDURE — 99999 PR PBB SHADOW E&M-EST. PATIENT-LVL III: CPT | Mod: PBBFAC,,, | Performed by: ANESTHESIOLOGY

## 2018-01-22 PROCEDURE — 99213 OFFICE O/P EST LOW 20 MIN: CPT | Mod: PBBFAC,PO | Performed by: ANESTHESIOLOGY

## 2018-01-22 PROCEDURE — 20553 NJX 1/MLT TRIGGER POINTS 3/>: CPT | Mod: S$PBB,,, | Performed by: ANESTHESIOLOGY

## 2018-01-22 PROCEDURE — 20553 NJX 1/MLT TRIGGER POINTS 3/>: CPT | Mod: PBBFAC,PO | Performed by: ANESTHESIOLOGY

## 2018-01-22 PROCEDURE — 99204 OFFICE O/P NEW MOD 45 MIN: CPT | Mod: 25,S$PBB,, | Performed by: ANESTHESIOLOGY

## 2018-01-22 RX ORDER — METRONIDAZOLE 7.5 MG/G
GEL TOPICAL 2 TIMES DAILY
Status: ON HOLD | COMMUNITY
End: 2018-04-10

## 2018-01-22 RX ORDER — MINOCYCLINE HYDROCHLORIDE 100 MG/1
100 CAPSULE ORAL 2 TIMES DAILY
Status: ON HOLD | COMMUNITY
End: 2018-07-03 | Stop reason: HOSPADM

## 2018-01-22 RX ORDER — GABAPENTIN 300 MG/1
CAPSULE ORAL
Status: ON HOLD | COMMUNITY
Start: 2018-01-17 | End: 2018-03-12

## 2018-01-22 RX ORDER — BUPIVACAINE HYDROCHLORIDE 2.5 MG/ML
10 INJECTION, SOLUTION EPIDURAL; INFILTRATION; INTRACAUDAL ONCE
Status: COMPLETED | OUTPATIENT
Start: 2018-01-22 | End: 2018-01-22

## 2018-01-22 RX ORDER — HALOBETASOL PROPIONATE 0.5 MG/G
CREAM TOPICAL 2 TIMES DAILY
COMMUNITY
End: 2018-01-26

## 2018-01-22 RX ADMIN — BUPIVACAINE HYDROCHLORIDE 25 MG: 2.5 INJECTION, SOLUTION EPIDURAL; INFILTRATION; INTRACAUDAL at 12:01

## 2018-01-22 NOTE — TELEPHONE ENCOUNTER
Pt will need clearance to hold her prescribed xarelto 2-3 days prior to her scheduled procedure. She is having a cervical CHRISTOPHER on 1/29/18.

## 2018-01-22 NOTE — PROGRESS NOTES
This note was completed with dictation software and grammatical errors may exist.    Referring Physician: Kristen rGubbs,*    PCP: Jan Olivo MD    CC:  R shoulder and neck pain    HPI:   Ayla Dela Cruz is a 72 y.o. female who presents as a new patient from Dr. Grubbs for 2.5 month history of R shoulder/mid back pain. The pain first began when she lifted some books at a bookstore in early November. She began feeling the pain in her R lateral neck/suprascapular region, with radiation down the back/side of her arm to the top of the elbow. She was treated with some topical creams, chiropractor adjustments, and then was placed on gabapentin by Dr. Grubbs, which has helped her pain a considerable amount. She states that the pain is intermittent, aching, sharp, shooting & radiates to her elbow. Worsened by sitting, or lying in bed. 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     ROS:  CONSTITUTIONAL: No fevers, chills, night sweats, wt. loss, appetite changes  SKIN: no rashes or itching  ENT: No headaches, head trauma, vision changes, or eye pain  LYMPH NODES: None noticed   CV: No chest pain, palpitations.   RESP: No shortness of breath, dyspnea on exertion, cough, wheezing, or hemoptysis  GI: No nausea, emesis, diarrhea, constipation, melena, hematochezia, pain.    : No dysuria, hematuria, urgency, or frequency   HEME: No easy bruising, bleeding problems  PSYCHIATRIC: No anxiety, psychosis, hallucinations. +ve depression  NEURO: No seizures, memory loss, dizziness, +ve difficulty sleeping  MSK: +HPI      Past Medical History:   Diagnosis Date    Anticoagulant long-term use     Anxiety     Arthritis     Atrial fibrillation     Cancer     skin    CHF (congestive heart failure)     Depression     DVT (deep venous thrombosis)     GERD (gastroesophageal reflux disease)     Glaucoma (increased eye pressure)     Hyperlipidemia     diet controlled    Hypertension     Interstitial lung disease   "   Pneumonia 1/31/2014    Stroke 6-3-14    Stroke     TIA (transient ischemic attack)     TIA (transient ischemic attack)      Past Surgical History:   Procedure Laterality Date    2 heart ablations      3 in total    bilateral cataracts      CHOLECYSTECTOMY      COLONOSCOPY N/A 1/19/2017    Procedure: COLONOSCOPY and EGD;  Surgeon: Jonathan Schultz MD;  Location: [facility];  Service: Endoscopy;  Laterality: N/A;    pyloristenosis      skin cancer removal       TONSILLECTOMY       Family History   Problem Relation Age of Onset    Heart disease Father     Ulcers Father     Arthritis Mother     Asthma Mother     Rheum arthritis Mother     Pneumonia Mother     Depression Son     Alzheimer's disease Maternal Uncle     Rheum arthritis Maternal Grandmother     Emphysema Maternal Grandfather     Cancer Maternal Grandfather      kidney    Kidney disease Maternal Grandfather     Cancer Paternal Grandmother      lung= smoker    Pneumonia Paternal Grandfather     Breast cancer Neg Hx     Ovarian cancer Neg Hx      Social History     Social History    Marital status:      Spouse name: N/A    Number of children: N/A    Years of education: N/A     Social History Main Topics    Smoking status: Never Smoker    Smokeless tobacco: Never Used    Alcohol use No    Drug use: No    Sexual activity: Yes     Partners: Male     Other Topics Concern    None     Social History Narrative    None         Medications/Allergies: See med card    Vitals:    01/22/18 0843   BP: (!) 152/71   Pulse: 62   Weight: 63 kg (139 lb)   Height: 5' 7" (1.702 m)   PainSc:   8   PainLoc: Neck         Physical exam:    GENERAL: A and O x3, the patient appears well groomed and is in no acute distress.  Skin: No rashes or obvious lesions  HEENT: normocephalic, atraumatic  CARDIOVASCULAR:  Palpable peripheral pulses  LUNGS: easy work of breathing  ABDOMEN: soft, nontender   UPPER EXTREMITIES: Normal alignment, normal range " of motion, no atrophy, no skin changes,  hair growth and nail growth normal and equal bilaterally. No swelling, no tenderness.    LOWER EXTREMITIES:  Normal alignment, normal range of motion, no atrophy, no skin changes,  hair growth and nail growth normal and equal bilaterally. No swelling, no tenderness.  CERVICAL SPINE:  Cervical spine: ROM is full in flexion, extension and lateral rotation without increased pain.  Spurling's maneuver causes no neck pain to either side.  Myofascial exam: tender to palpation along suprascapular muscle and anterior pressure of shoulder.  MENTAL STATUS: normal orientation, speech, language, and fund of knowledge for social situation.  Emotional state appropriate.    CRANIAL NERVES:  II:  PERRL bilaterally,   III,IV,VI: EOMI.    V:  Facial sensation equal bilaterally  VII:  Facial motor function normal.  VIII:  Hearing equal to finger rub bilaterally  IX/X: Gag normal, palate symmetric  XI:  Shoulder shrug equal, head turn equal  XII:  Tongue midline without fasciculations      MOTOR: Tone and bulk: normal bilateral upper and lower Strength: normal   Delt Bi Tri WE WF     R 5 5 5 5 5 5   L 5 5 5 5 5 5     IP ADD ABD Quad TA Gas HAM  R 5 5 5 5 5 5 5  L 5 5 5 5 5 5 5    SENSATION: Light touch and pinprick intact bilaterally  REFLEXES: normal, symmetric, nonbrisk.  Toes down, no clonus. No hoffmans.  GAIT: normal rise, base, steps, and arm swing.        Imaging:  MRI C-spine 12/2017  There is a 2 mm retrolisthesis of C4 on C5 and C6 on C7.  There is reversal of the normal cervical lordosis which could relate to neck muscle spasm.The cervical vertebral bodies show normal signal intensity and height with no indication of acute fracture or pathologic marrow replacement process.    There is degenerative disc desiccation at every cervical level with marginal osteophyte formation and disc space narrowing noted at C3-C4, C4-C5, C5-C6, and to a lesser extent, C6-C7.  There is congenital  spinal canal narrowing present.    The cervical cord is normal in signal intensity at all levels with no indication of myelomalacia or cord edema. The incidentally observed soft tissues of the neck show no significant abnormalities.    C2-C3: There is bilateral hypertrophic facet arthropathy.  There is left ligamentum flavum thickening.  No definite acquired central canal or neuroforaminal stenosis.    C3-C4: There is a posterior disc osteophyte complex with a superimposed broad central disc protrusion which flattens the ventral cord.  There is ligamentum flavum thickening, bilateral uncovertebral spurring, and bilateral facet arthropathy, left greater than right.  There is moderate overall central canal stenosis, moderate-severe left neuroforaminal stenosis, and moderate right neuroforaminal stenosis.    C4-C5: There is a bulging posterior disc osteophyte complex, ligamentum flavum thickening, bilateral uncovertebral spurring, and facet arthropathy, right greater than left.  There is severe bilateral neural foraminal stenosis and moderate overall central canal stenosis.  No abnormal cord signal.  There is flattening of the ventral cord.    C5-C6: There is a posterior disc osteophyte complex and bilateral uncovertebral spurring, right greater than left.  No left neuroforaminal stenosis.  There is mild-moderate right neuroforaminal stenosis and mild-moderate overall central canal stenosis.    C6-C7: There is a 2 mm retrolisthesis of C6 on C7 with uncovering of the intervertebral disc and a superimposed posterior disc osteophyte complex.  There is bilateral uncovertebral spurring, left greater than right.  There is severe left and moderate right neuroforaminal stenosis.  There is ligamentum flavum thickening.  There is moderate central canal stenosis with flattening of the ventral cord.    C7-T1: No central canal or neuroforaminal stenosis. No disc protrusion or extrusion.      Assessment:  71 yo female with  significant radiological findings of cervical disease who presents with neck pain, mostly along her R suprascapular muscle with associated radiation down to her R elbow  1. DDD (degenerative disc disease), cervical    2. Cervical radiculitis          Plan:  - I have stressed the importance of physical activity and exercise to improve overall health  - I think that the patient's neck pain and radicular arm symptoms are due to degenerative disc disease and have recommended a cervical epidural steroid injection.   - She desires trigger point injections for her myofascial pain today  - Reviewed MRI findings  - Follow up after procedure    Trigger point injection   Pre-procedure diagnosis: Myofascial trigger points in right cervical paraspinal and trapezisu region  Post-procedure diagnosis: Same    Upon examination, 4 trigger points were noted in the above noted regions.   Timeout performed.  After the procedure was described and informed consent obtained, the skin over the trigger points was cleaned with isopropyl alcohol. Using a 27-gauge needle, injection of 1ml of 0.25% bupivcaine was injected into each trigger point. The patient noted concordant radiating pain upon injection of her trigger points. The skin was then cleaned and bandages were applied to sites as necessary. Blood loss was <2ml. We observed the patient for 10 minutes after the procedure for any complications, and as there were none noted, the patient was then discharged home with instructions. We advised the patient that during the duration of the local anesthetic effect, this would be the optimal time to perform stretching exercises for the muscles which contain the trigger points. We also advised the patient to continue these exercises daily as this would likely give the best chance of resolution of the trigger points.

## 2018-01-24 ENCOUNTER — DOCUMENTATION ONLY (OUTPATIENT)
Dept: FAMILY MEDICINE | Facility: CLINIC | Age: 73
End: 2018-01-24

## 2018-01-24 NOTE — PROGRESS NOTES
Pre-Visit Chart Review  For Appointment Scheduled on 01/26/18    Health Maintenance Due   Topic Date Due    TETANUS VACCINE  03/12/1961    Zoster Vaccine  03/12/2003    Lipid Panel  01/18/2018

## 2018-01-26 ENCOUNTER — LAB VISIT (OUTPATIENT)
Dept: LAB | Facility: HOSPITAL | Age: 73
End: 2018-01-26
Attending: PHYSICIAN ASSISTANT
Payer: MEDICARE

## 2018-01-26 ENCOUNTER — OFFICE VISIT (OUTPATIENT)
Dept: FAMILY MEDICINE | Facility: CLINIC | Age: 73
End: 2018-01-26
Payer: MEDICARE

## 2018-01-26 VITALS
HEART RATE: 60 BPM | SYSTOLIC BLOOD PRESSURE: 127 MMHG | DIASTOLIC BLOOD PRESSURE: 73 MMHG | TEMPERATURE: 97 F | HEIGHT: 67 IN | WEIGHT: 138 LBS | BODY MASS INDEX: 21.66 KG/M2

## 2018-01-26 DIAGNOSIS — R73.02 IMPAIRED GLUCOSE TOLERANCE: ICD-10-CM

## 2018-01-26 DIAGNOSIS — I10 HYPERTENSION, UNSPECIFIED TYPE: ICD-10-CM

## 2018-01-26 DIAGNOSIS — F41.1 GAD (GENERALIZED ANXIETY DISORDER): ICD-10-CM

## 2018-01-26 DIAGNOSIS — M85.80 OSTEOPENIA, UNSPECIFIED LOCATION: ICD-10-CM

## 2018-01-26 DIAGNOSIS — I10 HYPERTENSION, UNSPECIFIED TYPE: Primary | ICD-10-CM

## 2018-01-26 DIAGNOSIS — E78.2 MIXED HYPERLIPIDEMIA: ICD-10-CM

## 2018-01-26 LAB
25(OH)D3+25(OH)D2 SERPL-MCNC: 29 NG/ML
ALBUMIN SERPL BCP-MCNC: 3.2 G/DL
ALP SERPL-CCNC: 68 U/L
ALT SERPL W/O P-5'-P-CCNC: 10 U/L
ANION GAP SERPL CALC-SCNC: 10 MMOL/L
AST SERPL-CCNC: 19 U/L
BASOPHILS # BLD AUTO: 0.06 K/UL
BASOPHILS NFR BLD: 0.6 %
BILIRUB SERPL-MCNC: 1.2 MG/DL
BUN SERPL-MCNC: 15 MG/DL
CALCIUM SERPL-MCNC: 9.4 MG/DL
CHLORIDE SERPL-SCNC: 108 MMOL/L
CHOLEST SERPL-MCNC: 136 MG/DL
CHOLEST/HDLC SERPL: 2.6 {RATIO}
CO2 SERPL-SCNC: 25 MMOL/L
CREAT SERPL-MCNC: 0.8 MG/DL
DIFFERENTIAL METHOD: ABNORMAL
EOSINOPHIL # BLD AUTO: 0.5 K/UL
EOSINOPHIL NFR BLD: 5.1 %
ERYTHROCYTE [DISTWIDTH] IN BLOOD BY AUTOMATED COUNT: 16.7 %
EST. GFR  (AFRICAN AMERICAN): >60 ML/MIN/1.73 M^2
EST. GFR  (NON AFRICAN AMERICAN): >60 ML/MIN/1.73 M^2
ESTIMATED AVG GLUCOSE: 123 MG/DL
GLUCOSE SERPL-MCNC: 77 MG/DL
HBA1C MFR BLD HPLC: 5.9 %
HCT VFR BLD AUTO: 39.9 %
HDLC SERPL-MCNC: 53 MG/DL
HDLC SERPL: 39 %
HGB BLD-MCNC: 12.1 G/DL
IMM GRANULOCYTES # BLD AUTO: 0.04 K/UL
IMM GRANULOCYTES NFR BLD AUTO: 0.4 %
LDLC SERPL CALC-MCNC: 68.2 MG/DL
LYMPHOCYTES # BLD AUTO: 0.6 K/UL
LYMPHOCYTES NFR BLD: 6.3 %
MCH RBC QN AUTO: 29.6 PG
MCHC RBC AUTO-ENTMCNC: 30.3 G/DL
MCV RBC AUTO: 98 FL
MONOCYTES # BLD AUTO: 1.2 K/UL
MONOCYTES NFR BLD: 12.7 %
NEUTROPHILS # BLD AUTO: 7.1 K/UL
NEUTROPHILS NFR BLD: 74.9 %
NONHDLC SERPL-MCNC: 83 MG/DL
NRBC BLD-RTO: 0 /100 WBC
PLATELET # BLD AUTO: 370 K/UL
PMV BLD AUTO: 9.3 FL
POTASSIUM SERPL-SCNC: 4.4 MMOL/L
PROT SERPL-MCNC: 6.9 G/DL
RBC # BLD AUTO: 4.09 M/UL
SODIUM SERPL-SCNC: 143 MMOL/L
TRIGL SERPL-MCNC: 74 MG/DL
TSH SERPL DL<=0.005 MIU/L-ACNC: 1.21 UIU/ML
WBC # BLD AUTO: 9.52 K/UL

## 2018-01-26 PROCEDURE — 80053 COMPREHEN METABOLIC PANEL: CPT

## 2018-01-26 PROCEDURE — 83036 HEMOGLOBIN GLYCOSYLATED A1C: CPT

## 2018-01-26 PROCEDURE — 99999 PR PBB SHADOW E&M-EST. PATIENT-LVL IV: CPT | Mod: PBBFAC,,, | Performed by: PHYSICIAN ASSISTANT

## 2018-01-26 PROCEDURE — 82306 VITAMIN D 25 HYDROXY: CPT

## 2018-01-26 PROCEDURE — 80061 LIPID PANEL: CPT

## 2018-01-26 PROCEDURE — 99214 OFFICE O/P EST MOD 30 MIN: CPT | Mod: PBBFAC,PO | Performed by: PHYSICIAN ASSISTANT

## 2018-01-26 PROCEDURE — 99214 OFFICE O/P EST MOD 30 MIN: CPT | Mod: S$PBB,,, | Performed by: PHYSICIAN ASSISTANT

## 2018-01-26 PROCEDURE — 84443 ASSAY THYROID STIM HORMONE: CPT

## 2018-01-26 PROCEDURE — 36415 COLL VENOUS BLD VENIPUNCTURE: CPT | Mod: PO

## 2018-01-26 PROCEDURE — 85025 COMPLETE CBC W/AUTO DIFF WBC: CPT

## 2018-01-26 NOTE — PROGRESS NOTES
Subjective:       Patient ID: Ayla Dela Cruz is a 72 y.o. female.    Chief Complaint: Annual Exam    HPI   Patient is a 72 year old  female with paroxysmal a.fib, HTN, HLD, GERD, Anxiety presenting to the clinic for annual physical exam. She has been having waxing and waning issues with cervical radicular pain radiating into right trap and down right arm. She is scheduled for steroid epidural injection with Dr. Clancy next week.   1) HTN- well controlled on current BP medications  2) Paroxysmal A.fib- has been in NSR since ablation surgery; has f/u apt with Dr. Calderon scheduled  3) HLD- on crestor 40mg daily; due for lipid panel  4) anxiety- patient sees outside NP in psychiatry whom presribes her anti-anxiety medications; TSH today    Of note, mammogram, DXA, colonoscopy is up to date.   Review of Systems   Constitutional: Negative for activity change and unexpected weight change.   HENT: Negative for hearing loss, rhinorrhea and trouble swallowing.    Eyes: Negative for discharge and visual disturbance.   Respiratory: Negative for chest tightness and wheezing.    Cardiovascular: Negative for chest pain and palpitations.   Gastrointestinal: Negative for blood in stool, constipation, diarrhea and vomiting.   Endocrine: Negative for polydipsia and polyuria.   Genitourinary: Negative for difficulty urinating, dysuria, hematuria and menstrual problem.   Musculoskeletal: Positive for neck pain. Negative for arthralgias and joint swelling.   Neurological: Negative for weakness and headaches.   Psychiatric/Behavioral: Negative for confusion and dysphoric mood.       Objective:      Physical Exam   Constitutional: Vital signs are normal. She appears well-developed and well-nourished. No distress.   HENT:   Head: Normocephalic and atraumatic.   Right Ear: Hearing, tympanic membrane, external ear and ear canal normal.   Left Ear: Hearing, tympanic membrane, external ear and ear canal normal.   Nose: Nose normal.    Mouth/Throat: Uvula is midline and oropharynx is clear and moist.   Cardiovascular: Normal rate, regular rhythm, S1 normal, S2 normal and normal heart sounds.  Exam reveals no gallop.    No murmur heard.  Pulses:       Radial pulses are 2+ on the right side, and 2+ on the left side.   Pulmonary/Chest: Effort normal and breath sounds normal. No respiratory distress. She has no wheezes. She has no rhonchi.   Abdominal: Soft. Normal appearance and bowel sounds are normal. There is no tenderness. There is no rigidity and no guarding.   Skin: Skin is warm and dry. She is not diaphoretic.   Psychiatric: She has a normal mood and affect. Her speech is normal and behavior is normal. Judgment and thought content normal. Cognition and memory are normal.       Assessment:       1. Hypertension, unspecified type    2. JOSE (generalized anxiety disorder)    3. Osteopenia, unspecified location    4. Impaired glucose tolerance    5. Mixed hyperlipidemia        Plan:       Ayla was seen today for annual exam.    Diagnoses and all orders for this visit:    Hypertension, unspecified type  -     CBC auto differential; Future  -     Comprehensive metabolic panel; Future  -     Hemoglobin A1c; Future  -     Lipid panel; Future  -     TSH; Future    JOSE (generalized anxiety disorder)  Stable on current anxiety medications    Osteopenia, unspecified location  -     Vitamin D; Future    Impaired glucose tolerance  -     Comprehensive metabolic panel; Future  -     Hemoglobin A1c; Future    Mixed hyperlipidemia  Lipid panel today    Patient readiness: acceptance and barriers:none    During the course of the visit the patient was educated and counseled about the following:     Hypertension:   Medication: no change.    Goals: Hypertension: Reduce Blood Pressure    Did patient meet goals/outcomes: No    The following self management tools provided: declined    Patient Instructions (the written plan) was given to the patient/family.     Time  spent with patient: 30 minutes    Barriers to medications present (no )    Adverse reactions to current medications (no)    Over the counter medications reviewed (Yes)

## 2018-01-29 ENCOUNTER — TELEPHONE (OUTPATIENT)
Dept: FAMILY MEDICINE | Facility: CLINIC | Age: 73
End: 2018-01-29

## 2018-01-29 ENCOUNTER — SURGERY (OUTPATIENT)
Age: 73
End: 2018-01-29

## 2018-01-29 ENCOUNTER — HOSPITAL ENCOUNTER (OUTPATIENT)
Facility: AMBULARY SURGERY CENTER | Age: 73
Discharge: HOME | End: 2018-01-29
Attending: ANESTHESIOLOGY | Admitting: ANESTHESIOLOGY
Payer: MEDICARE

## 2018-01-29 DIAGNOSIS — M54.12 CERVICAL RADICULITIS: Primary | ICD-10-CM

## 2018-01-29 PROCEDURE — 62321 NJX INTERLAMINAR CRV/THRC: CPT | Mod: ,,, | Performed by: ANESTHESIOLOGY

## 2018-01-29 PROCEDURE — 62321 NJX INTERLAMINAR CRV/THRC: CPT | Performed by: ANESTHESIOLOGY

## 2018-01-29 PROCEDURE — G8907 PT DOC NO EVENTS ON DISCHARG: HCPCS | Performed by: ANESTHESIOLOGY

## 2018-01-29 PROCEDURE — 99152 MOD SED SAME PHYS/QHP 5/>YRS: CPT | Mod: ,,, | Performed by: ANESTHESIOLOGY

## 2018-01-29 RX ORDER — FENTANYL CITRATE 50 UG/ML
INJECTION, SOLUTION INTRAMUSCULAR; INTRAVENOUS
Status: DISCONTINUED
Start: 2018-01-29 | End: 2018-01-29 | Stop reason: HOSPADM

## 2018-01-29 RX ORDER — LIDOCAINE HYDROCHLORIDE 10 MG/ML
INJECTION, SOLUTION EPIDURAL; INFILTRATION; INTRACAUDAL; PERINEURAL
Status: DISCONTINUED | OUTPATIENT
Start: 2018-01-29 | End: 2018-01-29 | Stop reason: HOSPADM

## 2018-01-29 RX ORDER — DEXAMETHASONE SODIUM PHOSPHATE 10 MG/ML
INJECTION INTRAMUSCULAR; INTRAVENOUS
Status: DISCONTINUED
Start: 2018-01-29 | End: 2018-01-29 | Stop reason: HOSPADM

## 2018-01-29 RX ORDER — SODIUM CHLORIDE 9 MG/ML
INJECTION, SOLUTION INTRAMUSCULAR; INTRAVENOUS; SUBCUTANEOUS
Status: DISCONTINUED | OUTPATIENT
Start: 2018-01-29 | End: 2018-01-29 | Stop reason: HOSPADM

## 2018-01-29 RX ORDER — MIDAZOLAM HYDROCHLORIDE 1 MG/ML
INJECTION INTRAMUSCULAR; INTRAVENOUS
Status: DISCONTINUED
Start: 2018-01-29 | End: 2018-01-29 | Stop reason: HOSPADM

## 2018-01-29 RX ORDER — FENTANYL CITRATE 50 UG/ML
INJECTION, SOLUTION INTRAMUSCULAR; INTRAVENOUS
Status: DISCONTINUED | OUTPATIENT
Start: 2018-01-29 | End: 2018-01-29 | Stop reason: HOSPADM

## 2018-01-29 RX ORDER — SODIUM CHLORIDE, SODIUM LACTATE, POTASSIUM CHLORIDE, CALCIUM CHLORIDE 600; 310; 30; 20 MG/100ML; MG/100ML; MG/100ML; MG/100ML
INJECTION, SOLUTION INTRAVENOUS CONTINUOUS
Status: DISCONTINUED | OUTPATIENT
Start: 2018-01-29 | End: 2018-01-29 | Stop reason: HOSPADM

## 2018-01-29 RX ORDER — LIDOCAINE HYDROCHLORIDE 10 MG/ML
INJECTION, SOLUTION EPIDURAL; INFILTRATION; INTRACAUDAL; PERINEURAL
Status: DISCONTINUED
Start: 2018-01-29 | End: 2018-01-29 | Stop reason: HOSPADM

## 2018-01-29 RX ORDER — DEXAMETHASONE SODIUM PHOSPHATE 10 MG/ML
INJECTION INTRAMUSCULAR; INTRAVENOUS
Status: DISCONTINUED | OUTPATIENT
Start: 2018-01-29 | End: 2018-01-29 | Stop reason: HOSPADM

## 2018-01-29 RX ORDER — MIDAZOLAM HYDROCHLORIDE 2 MG/2ML
INJECTION, SOLUTION INTRAMUSCULAR; INTRAVENOUS
Status: DISCONTINUED | OUTPATIENT
Start: 2018-01-29 | End: 2018-01-29 | Stop reason: HOSPADM

## 2018-01-29 RX ADMIN — SODIUM CHLORIDE 4 ML: 9 INJECTION, SOLUTION INTRAMUSCULAR; INTRAVENOUS; SUBCUTANEOUS at 01:01

## 2018-01-29 RX ADMIN — LIDOCAINE HYDROCHLORIDE 5 ML: 10 INJECTION, SOLUTION EPIDURAL; INFILTRATION; INTRACAUDAL; PERINEURAL at 01:01

## 2018-01-29 RX ADMIN — SODIUM CHLORIDE, SODIUM LACTATE, POTASSIUM CHLORIDE, CALCIUM CHLORIDE: 600; 310; 30; 20 INJECTION, SOLUTION INTRAVENOUS at 12:01

## 2018-01-29 RX ADMIN — MIDAZOLAM HYDROCHLORIDE 2 MG: 2 INJECTION, SOLUTION INTRAMUSCULAR; INTRAVENOUS at 01:01

## 2018-01-29 RX ADMIN — FENTANYL CITRATE 50 MCG: 50 INJECTION, SOLUTION INTRAMUSCULAR; INTRAVENOUS at 01:01

## 2018-01-29 RX ADMIN — DEXAMETHASONE SODIUM PHOSPHATE 10 MG: 10 INJECTION INTRAMUSCULAR; INTRAVENOUS at 01:01

## 2018-01-29 NOTE — H&P (VIEW-ONLY)
This note was completed with dictation software and grammatical errors may exist.    Referring Physician: Kristen Grubbs,*    PCP: Jan Olivo MD    CC:  R shoulder and neck pain    HPI:   Ayla Dela Cruz is a 72 y.o. female who presents as a new patient from Dr. Grubbs for 2.5 month history of R shoulder/mid back pain. The pain first began when she lifted some books at a bookstore in early November. She began feeling the pain in her R lateral neck/suprascapular region, with radiation down the back/side of her arm to the top of the elbow. She was treated with some topical creams, chiropractor adjustments, and then was placed on gabapentin by Dr. Grubbs, which has helped her pain a considerable amount. She states that the pain is intermittent, aching, sharp, shooting & radiates to her elbow. Worsened by sitting, or lying in bed. 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     ROS:  CONSTITUTIONAL: No fevers, chills, night sweats, wt. loss, appetite changes  SKIN: no rashes or itching  ENT: No headaches, head trauma, vision changes, or eye pain  LYMPH NODES: None noticed   CV: No chest pain, palpitations.   RESP: No shortness of breath, dyspnea on exertion, cough, wheezing, or hemoptysis  GI: No nausea, emesis, diarrhea, constipation, melena, hematochezia, pain.    : No dysuria, hematuria, urgency, or frequency   HEME: No easy bruising, bleeding problems  PSYCHIATRIC: No anxiety, psychosis, hallucinations. +ve depression  NEURO: No seizures, memory loss, dizziness, +ve difficulty sleeping  MSK: +HPI      Past Medical History:   Diagnosis Date    Anticoagulant long-term use     Anxiety     Arthritis     Atrial fibrillation     Cancer     skin    CHF (congestive heart failure)     Depression     DVT (deep venous thrombosis)     GERD (gastroesophageal reflux disease)     Glaucoma (increased eye pressure)     Hyperlipidemia     diet controlled    Hypertension     Interstitial lung disease   "   Pneumonia 1/31/2014    Stroke 6-3-14    Stroke     TIA (transient ischemic attack)     TIA (transient ischemic attack)      Past Surgical History:   Procedure Laterality Date    2 heart ablations      3 in total    bilateral cataracts      CHOLECYSTECTOMY      COLONOSCOPY N/A 1/19/2017    Procedure: COLONOSCOPY and EGD;  Surgeon: Jonathan Schultz MD;  Location[facility];  Service: Endoscopy;  Laterality: N/A;    pyloristenosis      skin cancer removal       TONSILLECTOMY       Family History   Problem Relation Age of Onset    Heart disease Father     Ulcers Father     Arthritis Mother     Asthma Mother     Rheum arthritis Mother     Pneumonia Mother     Depression Son     Alzheimer's disease Maternal Uncle     Rheum arthritis Maternal Grandmother     Emphysema Maternal Grandfather     Cancer Maternal Grandfather      kidney    Kidney disease Maternal Grandfather     Cancer Paternal Grandmother      lung= smoker    Pneumonia Paternal Grandfather     Breast cancer Neg Hx     Ovarian cancer Neg Hx      Social History     Social History    Marital status:      Spouse name: N/A    Number of children: N/A    Years of education: N/A     Social History Main Topics    Smoking status: Never Smoker    Smokeless tobacco: Never Used    Alcohol use No    Drug use: No    Sexual activity: Yes     Partners: Male     Other Topics Concern    None     Social History Narrative    None         Medications/Allergies: See med card    Vitals:    01/22/18 0843   BP: (!) 152/71   Pulse: 62   Weight: 63 kg (139 lb)   Height: 5' 7" (1.702 m)   PainSc:   8   PainLoc: Neck         Physical exam:    GENERAL: A and O x3, the patient appears well groomed and is in no acute distress.  Skin: No rashes or obvious lesions  HEENT: normocephalic, atraumatic  CARDIOVASCULAR:  Palpable peripheral pulses  LUNGS: easy work of breathing  ABDOMEN: soft, nontender   UPPER EXTREMITIES: Normal alignment, normal range " of motion, no atrophy, no skin changes,  hair growth and nail growth normal and equal bilaterally. No swelling, no tenderness.    LOWER EXTREMITIES:  Normal alignment, normal range of motion, no atrophy, no skin changes,  hair growth and nail growth normal and equal bilaterally. No swelling, no tenderness.  CERVICAL SPINE:  Cervical spine: ROM is full in flexion, extension and lateral rotation without increased pain.  Spurling's maneuver causes no neck pain to either side.  Myofascial exam: tender to palpation along suprascapular muscle and anterior pressure of shoulder.  MENTAL STATUS: normal orientation, speech, language, and fund of knowledge for social situation.  Emotional state appropriate.    CRANIAL NERVES:  II:  PERRL bilaterally,   III,IV,VI: EOMI.    V:  Facial sensation equal bilaterally  VII:  Facial motor function normal.  VIII:  Hearing equal to finger rub bilaterally  IX/X: Gag normal, palate symmetric  XI:  Shoulder shrug equal, head turn equal  XII:  Tongue midline without fasciculations      MOTOR: Tone and bulk: normal bilateral upper and lower Strength: normal   Delt Bi Tri WE WF     R 5 5 5 5 5 5   L 5 5 5 5 5 5     IP ADD ABD Quad TA Gas HAM  R 5 5 5 5 5 5 5  L 5 5 5 5 5 5 5    SENSATION: Light touch and pinprick intact bilaterally  REFLEXES: normal, symmetric, nonbrisk.  Toes down, no clonus. No hoffmans.  GAIT: normal rise, base, steps, and arm swing.        Imaging:  MRI C-spine 12/2017  There is a 2 mm retrolisthesis of C4 on C5 and C6 on C7.  There is reversal of the normal cervical lordosis which could relate to neck muscle spasm.The cervical vertebral bodies show normal signal intensity and height with no indication of acute fracture or pathologic marrow replacement process.    There is degenerative disc desiccation at every cervical level with marginal osteophyte formation and disc space narrowing noted at C3-C4, C4-C5, C5-C6, and to a lesser extent, C6-C7.  There is congenital  spinal canal narrowing present.    The cervical cord is normal in signal intensity at all levels with no indication of myelomalacia or cord edema. The incidentally observed soft tissues of the neck show no significant abnormalities.    C2-C3: There is bilateral hypertrophic facet arthropathy.  There is left ligamentum flavum thickening.  No definite acquired central canal or neuroforaminal stenosis.    C3-C4: There is a posterior disc osteophyte complex with a superimposed broad central disc protrusion which flattens the ventral cord.  There is ligamentum flavum thickening, bilateral uncovertebral spurring, and bilateral facet arthropathy, left greater than right.  There is moderate overall central canal stenosis, moderate-severe left neuroforaminal stenosis, and moderate right neuroforaminal stenosis.    C4-C5: There is a bulging posterior disc osteophyte complex, ligamentum flavum thickening, bilateral uncovertebral spurring, and facet arthropathy, right greater than left.  There is severe bilateral neural foraminal stenosis and moderate overall central canal stenosis.  No abnormal cord signal.  There is flattening of the ventral cord.    C5-C6: There is a posterior disc osteophyte complex and bilateral uncovertebral spurring, right greater than left.  No left neuroforaminal stenosis.  There is mild-moderate right neuroforaminal stenosis and mild-moderate overall central canal stenosis.    C6-C7: There is a 2 mm retrolisthesis of C6 on C7 with uncovering of the intervertebral disc and a superimposed posterior disc osteophyte complex.  There is bilateral uncovertebral spurring, left greater than right.  There is severe left and moderate right neuroforaminal stenosis.  There is ligamentum flavum thickening.  There is moderate central canal stenosis with flattening of the ventral cord.    C7-T1: No central canal or neuroforaminal stenosis. No disc protrusion or extrusion.      Assessment:  73 yo female with  significant radiological findings of cervical disease who presents with neck pain, mostly along her R suprascapular muscle with associated radiation down to her R elbow  1. DDD (degenerative disc disease), cervical    2. Cervical radiculitis          Plan:  - I have stressed the importance of physical activity and exercise to improve overall health  - I think that the patient's neck pain and radicular arm symptoms are due to degenerative disc disease and have recommended a cervical epidural steroid injection.   - She desires trigger point injections for her myofascial pain today  - Reviewed MRI findings  - Follow up after procedure    Trigger point injection   Pre-procedure diagnosis: Myofascial trigger points in right cervical paraspinal and trapezisu region  Post-procedure diagnosis: Same    Upon examination, 4 trigger points were noted in the above noted regions.   Timeout performed.  After the procedure was described and informed consent obtained, the skin over the trigger points was cleaned with isopropyl alcohol. Using a 27-gauge needle, injection of 1ml of 0.25% bupivcaine was injected into each trigger point. The patient noted concordant radiating pain upon injection of her trigger points. The skin was then cleaned and bandages were applied to sites as necessary. Blood loss was <2ml. We observed the patient for 10 minutes after the procedure for any complications, and as there were none noted, the patient was then discharged home with instructions. We advised the patient that during the duration of the local anesthetic effect, this would be the optimal time to perform stretching exercises for the muscles which contain the trigger points. We also advised the patient to continue these exercises daily as this would likely give the best chance of resolution of the trigger points.

## 2018-01-29 NOTE — TELEPHONE ENCOUNTER
"Called pt regarding below message.   Per Krys LAZO; "Vitamin D very minimally decreased. She needs to make sure she is taking 1,000-2,000 IU vitamin D daily. Thyroid, electrolytes look good. Blood counts stable. A1C (3 month sugar average) stable but consistent with "pre-diabetes." She needs to make sure she is getting some routine exercise."  Informed pt of results and recommendations. Pt verbalized understanding with no further questions.    ----- Message from Mark Medeiros sent at 1/29/2018  7:49 AM CST -----  Contact: same  Unsuccessful call placed to office.  Patient called in and stated she missed a call from office early this morning regarding lab results.  Patient call back number is [phone]    "

## 2018-01-29 NOTE — PLAN OF CARE
Patient sitting up in chair and states she is ready to go home. Spouse chairside and states he is ready to take the patient home; he states he is driving the patient home. Patient denies pain, nausea or any limb weakness. Ice pack given for PRN use with instructions for usage and precautions of for injection site pain. Patient and spouse verbalize understanding.

## 2018-01-29 NOTE — DISCHARGE INSTRUCTIONS
Recovery After Procedural Sedation (Adult)  You have been given medicine by vein to make you sleep during your surgery. This may have included both a pain medicine and sleeping medicine. Most of the effects have worn off. But you may still have some drowsiness for the next 6 to 8 hours.  Home care  Follow these guidelines when you get home:  · For the next 8 hours, you should be watched by a responsible adult. This person should make sure your condition is not getting worse.  · Don't drink any alcohol for the next 24 hours.  · Don't drive, operate dangerous machinery, or make important business or personal decisions during the next 24 hours.  Note: Your healthcare provider may tell you not to take any medicine by mouth for pain or sleep in the next 4 hours. These medicines may react with the medicines you were given in the hospital. This could cause a much stronger response than usual.  Follow-up care  Follow up with your healthcare provider if you are not alert and back to your usual level of activity within 12 hours.  When to seek medical advice  Call your healthcare provider right away if any of these occur:  · Drowsiness gets worse  · Weakness or dizziness gets worse  · Repeated vomiting  · You can't be awakened   Date Last Reviewed: 10/18/2016  © 9250-2576 [employer]. [address] [place] [address], [place] [address]. All rights reserved. This information is not intended as a substitute for professional medical care. Always follow your healthcare professional's instructions.      Pain injection instructions:     Steroids take about a week to relieve pain.  Initially you may get pain relief from the local anesthetic but this may wear off before the steroid works.    No driving for 24 hrs   Activity as tolerated- gradually increase activities.  Dont lift over 10 lbs for 24 hrs   No heat at injection sites x 2 days. No hot tubs, saunas, swimming pools or heating pads for 2 days.  Use ice pack for mild  swelling and for comfort at 20 minute intervals, 20 minutes on 20 minutes off. No direct contact of ice to skin.  May shower today. No baths for two days.      Resume Aspirin, Plavix, or Coumadin the day after the procedure unless otherwise instructed.   If diabetic,monitor your glucose carefully as steroids can increase glucose level    Seek immediate medical help for:   Severe increase in your usual pain or appearance of new pain.  Prolonged or increasing weakness or numbness in the legs or arms.    - Numbing medicine was injected that affects nerves that carry information from   the    muscles to brain and the brain to the muscles.  This numbness can last 4-6 hrs so be very careful to prevent falls; get assistance standing or walking.    Fever above 101 ,Drainage,redness,active bleeding, or increased swelling at the injection site.  Headache, shortness of breath, chest pain, or breathing problems.

## 2018-01-29 NOTE — OP NOTE
PROCEDURE DATE: 1/29/2018    Procedure: C7-T1 cervical interlaminar epidural steroid injection under utilizing fluoroscopy.    Diagnosis: Cervical Degenerative Disc Diease; Cervical Radiculitis  POSTOP DIAGNOSIS: SAME    Physician: Galo Clancy MD    Medications injected:  Dexamethasone 10mg followed by a slow injection of 4 mL sterile, preservative-free normal saline.    Local anesthetic used: Lidocaine 1%, 2 ml.    Sedation Medications: RN IV sedation    Complications:  None    Estimated blood loss: None    Technique:  A time-out was taken to identify patient and procedure prior to starting the procedure.  With the patient laying in a prone position with the neck in a mid-flexed forward position, the area was prepped and draped in the usual sterile fashion using ChloraPrep and a fenestrated drape.  The area was determined under AP fluoroscopic guidance.  Local anesthetic was given using a 25-gauge 1.5 inch needle by raising a wheal and then infiltrating ventrally.  A 3.5 inch 20-gauge Touhy needle was introduced under fluoroscopic guidance to meet the lamina of C7.  The needle was then hinged under the lamina then advanced using loss of resistance technique.  Once the tip of the needle was in the desired position, the 1ml contrast dye Omnipaque was injected to determine placement and no uptake.  The steroid was then injected slowly followed by a slow injection of 4 mL of the sterile preservative-free normal saline.  The patient tolerated the procedure well.    The patient was monitored after the procedure and was given post-procedure and discharge instructions to follow at home. The patient was discharged in a stable condition.

## 2018-01-29 NOTE — DISCHARGE SUMMARY
[facility]  Discharge Note  Short Stay    Admit Date: 1/29/2018    Discharge Date and Time: 1/29/2018    Attending Physician: Galo Clancy MD     Discharge Provider: Galo Clancy    Diagnoses:  Active Hospital Problems    Diagnosis  POA    *Cervical radiculitis [M54.12]  Yes      Resolved Hospital Problems    Diagnosis Date Resolved POA   No resolved problems to display.       Hospital Course: Cervical CHRISTOPHER  Discharged Condition: Good    Final Diagnoses:   Active Hospital Problems    Diagnosis  POA    *Cervical radiculitis [M54.12]  Yes      Resolved Hospital Problems    Diagnosis Date Resolved POA   No resolved problems to display.       Disposition: Home or Self Care    Follow up/Patient Instructions:    Medications:  Reconciled Home Medications:   Current Discharge Medication List      CONTINUE these medications which have NOT CHANGED    Details   metoprolol tartrate (LOPRESSOR) 50 MG Tab Take 1 tablet (50 mg total) by mouth 2 (two) times daily.  Qty: 90 tablet, Refills: 4      VIIBRYD 40 mg Tab tablet Take 40 mg by mouth once daily.  Refills: 2      ammonium lactate (LAC-HYDRIN) 12 % lotion       ATROPINE SULFATE, PF, OPHT Apply to eye.      clobetasol (TEMOVATE) 0.05 % cream APPLY TWICE DAILY TO RASH UP TO 2 WEEKS/MONTH AS NEEDED.  Refills: 3      dorzolamide-timolol (COSOPT) 2-0.5 % ophthalmic solution Place 1 drop into both eyes 2 (two) times daily. Twice a day      gabapentin (NEURONTIN) 300 MG capsule       lorazepam (ATIVAN) 1 MG tablet TAKE 1 TABLET BY MOUTH DAILY  Qty: 30 tablet, Refills: 3    Comments: This request is for a new prescription for a controlled substance as required by Federal/State law..  Associated Diagnoses: Anxiety      losartan (COZAAR) 100 MG tablet TAKE 1 TABLET (100 MG TOTAL) BY MOUTH ONCE DAILY.  Qty: 90 tablet, Refills: 2      metroNIDAZOLE (METROGEL) 0.75 % gel Apply topically 2 (two) times daily.      minocycline (MINOCIN,DYNACIN) 100 MG capsule Take 100 mg by mouth 2  (two) times daily.      pantoprazole (PROTONIX) 40 MG tablet Take 1 tablet (40 mg total) by mouth once daily.  Qty: 90 tablet, Refills: 3      rosuvastatin (CRESTOR) 40 MG Tab TAKE 1 TABLET (40 MG TOTAL) BY MOUTH EVERY EVENING.  Qty: 90 tablet, Refills: 3    Associated Diagnoses: Cerebral infarction due to thrombosis of left carotid artery; Hyperlipidemia      XARELTO 20 mg Tab TAKE 1 TABLET (20 MG TOTAL) BY MOUTH DAILY WITH DINNER OR EVENING MEAL.  Qty: 30 tablet, Refills: 6             Discharge Procedure Orders  Call MD for:  temperature >100.4     Call MD for:  persistent nausea and vomiting or diarrhea     Call MD for:  severe uncontrolled pain     Call MD for:  redness, tenderness, or signs of infection (pain, swelling, redness, odor or green/yellow discharge around incision site)     Call MD for:  difficulty breathing or increased cough     Call MD for:  severe persistent headache          Follow up with MD in 2-3 weeks    Discharge Procedure Orders (must include Diet, Follow-up, Activity):    Discharge Procedure Orders (must include Diet, Follow-up, Activity)  Call MD for:  temperature >100.4     Call MD for:  persistent nausea and vomiting or diarrhea     Call MD for:  severe uncontrolled pain     Call MD for:  redness, tenderness, or signs of infection (pain, swelling, redness, odor or green/yellow discharge around incision site)     Call MD for:  difficulty breathing or increased cough     Call MD for:  severe persistent headache

## 2018-01-29 NOTE — INTERVAL H&P NOTE
The patient has been examined and the H&P has been reviewed:    I concur with the findings and no changes have occurred since H&P was written.   This patient has been cleared for surgery in an ambulatory surgical facility    ASA 3,  MP3  No history of anesthetic complications  Plan for RN IV sedation      Anesthesia/Surgery risks, benefits and alternative options discussed and understood by patient/family.          Active Hospital Problems    Diagnosis  POA    Cervical radiculitis [M54.12]  Yes      Resolved Hospital Problems    Diagnosis Date Resolved POA   No resolved problems to display.

## 2018-01-29 NOTE — TELEPHONE ENCOUNTER
"----- Message from VIOLET Wilkinson sent at 1/29/2018  6:53 AM CST -----  Vitamin D very minimally decreased. She needs to make sure she is taking 1,000-2,000 IU vitamin D daily.     Thyroid, electrolytes look good. Blood counts stable. A1C (3 month sugar average) stable but consistent with "pre-diabetes." She needs to make sure she is getting some routine exercise.  "

## 2018-01-30 VITALS
HEIGHT: 67 IN | HEART RATE: 61 BPM | RESPIRATION RATE: 16 BRPM | SYSTOLIC BLOOD PRESSURE: 174 MMHG | DIASTOLIC BLOOD PRESSURE: 74 MMHG | TEMPERATURE: 98 F | OXYGEN SATURATION: 95 % | BODY MASS INDEX: 21.8 KG/M2 | WEIGHT: 138.88 LBS

## 2018-02-07 ENCOUNTER — OFFICE VISIT (OUTPATIENT)
Dept: CARDIOLOGY | Facility: CLINIC | Age: 73
End: 2018-02-07
Payer: MEDICARE

## 2018-02-07 VITALS
SYSTOLIC BLOOD PRESSURE: 158 MMHG | BODY MASS INDEX: 21.42 KG/M2 | WEIGHT: 136.44 LBS | DIASTOLIC BLOOD PRESSURE: 90 MMHG | HEART RATE: 64 BPM | HEIGHT: 67 IN

## 2018-02-07 DIAGNOSIS — I48.4 ATYPICAL ATRIAL FLUTTER: ICD-10-CM

## 2018-02-07 DIAGNOSIS — Z78.9 STATIN INTOLERANCE: ICD-10-CM

## 2018-02-07 DIAGNOSIS — Z86.79 S/P ABLATION OF ATRIAL FLUTTER: ICD-10-CM

## 2018-02-07 DIAGNOSIS — E78.2 MIXED HYPERLIPIDEMIA: ICD-10-CM

## 2018-02-07 DIAGNOSIS — Z98.890 S/P ABLATION OF ATRIAL FIBRILLATION: ICD-10-CM

## 2018-02-07 DIAGNOSIS — Z79.01 ANTICOAGULATED: ICD-10-CM

## 2018-02-07 DIAGNOSIS — Z98.890 S/P ABLATION OF ATRIAL FLUTTER: ICD-10-CM

## 2018-02-07 DIAGNOSIS — I48.92 ATRIAL FLUTTER, UNSPECIFIED TYPE: ICD-10-CM

## 2018-02-07 DIAGNOSIS — I48.0 PAROXYSMAL ATRIAL FIBRILLATION: Primary | ICD-10-CM

## 2018-02-07 DIAGNOSIS — J84.9 INTERSTITIAL LUNG DISEASE: ICD-10-CM

## 2018-02-07 DIAGNOSIS — Z86.73 H/O: CVA (CEREBROVASCULAR ACCIDENT): ICD-10-CM

## 2018-02-07 DIAGNOSIS — I10 ESSENTIAL HYPERTENSION: ICD-10-CM

## 2018-02-07 DIAGNOSIS — Z86.79 S/P ABLATION OF ATRIAL FIBRILLATION: ICD-10-CM

## 2018-02-07 PROCEDURE — 93005 ELECTROCARDIOGRAM TRACING: CPT | Mod: PBBFAC,PO | Performed by: INTERNAL MEDICINE

## 2018-02-07 PROCEDURE — 1159F MED LIST DOCD IN RCRD: CPT | Mod: ,,, | Performed by: INTERNAL MEDICINE

## 2018-02-07 PROCEDURE — 93010 ELECTROCARDIOGRAM REPORT: CPT | Mod: S$PBB,,, | Performed by: INTERNAL MEDICINE

## 2018-02-07 PROCEDURE — 99999 PR PBB SHADOW E&M-EST. PATIENT-LVL IV: CPT | Mod: PBBFAC,,, | Performed by: INTERNAL MEDICINE

## 2018-02-07 PROCEDURE — 99214 OFFICE O/P EST MOD 30 MIN: CPT | Mod: PBBFAC,PO,25 | Performed by: INTERNAL MEDICINE

## 2018-02-07 PROCEDURE — 99214 OFFICE O/P EST MOD 30 MIN: CPT | Mod: S$PBB,,, | Performed by: INTERNAL MEDICINE

## 2018-02-07 PROCEDURE — 1126F AMNT PAIN NOTED NONE PRSNT: CPT | Mod: ,,, | Performed by: INTERNAL MEDICINE

## 2018-02-07 NOTE — PROGRESS NOTES
aSubjective:      Atrial Fibrillation   Symptoms are negative for chest pain, palpitations, shortness of breath and syncope. Past medical history includes atrial fibrillation.     Cardiologist: Barrett Jurado MD    I had the pleasure of seeing Ayla Dela Cruz in follow up for her history of atrial arrhythmias and PVI. She is a 72 year old female with a history of HTN, HLD, and TIA in 2014, whose history of arrhythmias dates back to her 30s when she began to experience sudden onset palpitations. She was started on Tambacor at that time, which improved her symptoms. She was started on Coumadin at age 55 years. In the mid-1990s, she underwent an ablation for what sounds like atrial flutter in [place], [place]. In 1997 she underwent a second ablation which she was told was unsuccessful. Since that time she has undergone two electrical cardioversions, once in the mid-2000s (which lasted 5 years), and another around 2010. Around 2014 her arrhythmias recurred around the time she had her TIA. She was started on Xarelto and restarted on Tambacor at that time. Notably, on 100 mg bid she was having AF recurrences and SOB. The dose was lowered to 50 mg bid and eventually stopped. When I initially saw her she was only on Metoprolol.    Recent cardiac studies include an echo from 10/2016 which showed an EF of 65-70%, normal LA size, and a PASP of 64 mmHg (EF 40% in 11/2014).    I reviewed all ECGs in the EMR at her initial office visit. ECGs from 3184-0033 showed sinus rhythm. ECGs from 1/2014 and 4/2014 showed AF. ECGs between 6/2014 and 1/2016 showed sinus bradycardia and NSR. All ECGs between 1/16/2016 and 5/2017 showed either AF or AFL. When in AFL, RVR was generally seen.    In 9/2017, a PVI was performed. All four PVs were reconnected from a prior PVI, and successfully reisolated. A septal MA flutter line was also created due to frequent inductions during the case. The existing CTI-line was found to be intact. She was  discharged on Amiodarone 100 mg daily. At her 11/2017 follow-up, Ms. Dela Cruz was feeling well apart from occasional AVILA relieved with lasix. Her energy level had vastly improved since pre-ablation. An ECG showed sinus rhythm. Amiodarone was stopped at that visit (I was concerned it was causing her intermittent AVILA). Stopping Amiodarone improved these symptoms. A 48 hour Holter monitor performed in 11/2017 showed sinus rhythm with an average HR of 58 bpm.     Today in the office Ms. Dela Cruz continues to feel well, and has no complaints.    I reviewed today's ECG tracing, which shows sinus rhythm at 61 bpm.    Review of Systems   Constitution: Negative for decreased appetite, malaise/fatigue, weight gain and weight loss.   HENT: Negative for sore throat.    Eyes: Negative for blurred vision.   Cardiovascular: Negative for chest pain, dyspnea on exertion, irregular heartbeat, leg swelling, near-syncope, orthopnea, palpitations, paroxysmal nocturnal dyspnea and syncope.   Respiratory: Negative for shortness of breath.    Skin: Negative for rash.   Musculoskeletal: Negative for arthritis.   Gastrointestinal: Negative for abdominal pain.   Neurological: Negative for focal weakness.   Psychiatric/Behavioral: Negative for altered mental status.        Objective:    Physical Exam   Constitutional: She is oriented to person, place, and time. She appears well-developed and well-nourished. No distress.   HENT:   Head: Normocephalic and atraumatic.   Mouth/Throat: Oropharynx is clear and moist.   Eyes: Conjunctivae are normal. Pupils are equal, round, and reactive to light. No scleral icterus.   Neck: Normal range of motion. Neck supple. No JVD present. No thyromegaly present.   Cardiovascular: Normal rate, regular rhythm, normal heart sounds and intact distal pulses.  Exam reveals no gallop and no friction rub.    No murmur heard.  Pulmonary/Chest: Effort normal and breath sounds normal. No respiratory distress. She has no  rales.   Abdominal: Soft. Bowel sounds are normal. She exhibits no distension.   Musculoskeletal: She exhibits no edema.   Neurological: She is alert and oriented to person, place, and time.   Skin: Skin is warm and dry.   Psychiatric: She has a normal mood and affect. Her behavior is normal.   Vitals reviewed.        Assessment:       1. Paroxysmal atrial fibrillation, ablations X 2 1995, 1997, CHADS-VAS score 5, HAS-BLED score 5     2. S/P ablation of atrial flutter    3. S/P ablation of atrial fibrillation    4. H/O: CVA (cerebrovascular accident), June 2014    5. Interstitial lung disease    6. Essential hypertension    7. Mixed hyperlipidemia    8. Atypical atrial flutter    9. Anticoagulated    10. Statin intolerance         Plan:   In summary, Ayla Dela Cruz is a 72 year old female with a longstanding history of atrial arrhythmias and prior ablations at outside institutions. Her [id number] Score is 5 (age, female, TIA, HTN) so she should remain on anticoagulation indefinitely. She is now 5 months post-PVI and MA flutter line, and maintaining sinus rhythm off Amiodarone. The plan is to continue Xarelto, and to see me again in 6 months.    Thank you for allowing me to participate in the care of this patient. Please do not hesitate to call me with any questions or concerns.

## 2018-02-08 ENCOUNTER — TELEPHONE (OUTPATIENT)
Dept: ELECTROPHYSIOLOGY | Facility: CLINIC | Age: 73
End: 2018-02-08

## 2018-02-08 DIAGNOSIS — I48.92 ATRIAL FLUTTER, UNSPECIFIED TYPE: Primary | ICD-10-CM

## 2018-02-19 ENCOUNTER — HOSPITAL ENCOUNTER (OUTPATIENT)
Dept: RADIOLOGY | Facility: HOSPITAL | Age: 73
Discharge: HOME | End: 2018-02-19
Attending: PHYSICIAN ASSISTANT
Payer: MEDICARE

## 2018-02-19 ENCOUNTER — OFFICE VISIT (OUTPATIENT)
Dept: PAIN MEDICINE | Facility: CLINIC | Age: 73
End: 2018-02-19
Payer: MEDICARE

## 2018-02-19 ENCOUNTER — TELEPHONE (OUTPATIENT)
Dept: PAIN MEDICINE | Facility: CLINIC | Age: 73
End: 2018-02-19

## 2018-02-19 VITALS
WEIGHT: 136 LBS | HEIGHT: 67 IN | BODY MASS INDEX: 21.35 KG/M2 | HEART RATE: 58 BPM | DIASTOLIC BLOOD PRESSURE: 71 MMHG | SYSTOLIC BLOOD PRESSURE: 155 MMHG

## 2018-02-19 DIAGNOSIS — M54.16 LUMBAR RADICULOPATHY: ICD-10-CM

## 2018-02-19 DIAGNOSIS — M54.50 CHRONIC MIDLINE LOW BACK PAIN WITHOUT SCIATICA: ICD-10-CM

## 2018-02-19 DIAGNOSIS — M50.30 DDD (DEGENERATIVE DISC DISEASE), CERVICAL: ICD-10-CM

## 2018-02-19 DIAGNOSIS — G89.29 CHRONIC MIDLINE LOW BACK PAIN WITHOUT SCIATICA: ICD-10-CM

## 2018-02-19 DIAGNOSIS — M54.16 LUMBAR RADICULOPATHY: Primary | ICD-10-CM

## 2018-02-19 DIAGNOSIS — M54.12 CERVICAL RADICULOPATHY: ICD-10-CM

## 2018-02-19 PROCEDURE — 99214 OFFICE O/P EST MOD 30 MIN: CPT | Mod: S$PBB,,, | Performed by: PHYSICIAN ASSISTANT

## 2018-02-19 PROCEDURE — 72114 X-RAY EXAM L-S SPINE BENDING: CPT | Mod: 26,,, | Performed by: RADIOLOGY

## 2018-02-19 PROCEDURE — 1125F AMNT PAIN NOTED PAIN PRSNT: CPT | Mod: ,,, | Performed by: PHYSICIAN ASSISTANT

## 2018-02-19 PROCEDURE — 72114 X-RAY EXAM L-S SPINE BENDING: CPT | Mod: TC,FY

## 2018-02-19 PROCEDURE — 1159F MED LIST DOCD IN RCRD: CPT | Mod: ,,, | Performed by: PHYSICIAN ASSISTANT

## 2018-02-19 PROCEDURE — 99999 PR PBB SHADOW E&M-EST. PATIENT-LVL IV: CPT | Mod: PBBFAC,,, | Performed by: PHYSICIAN ASSISTANT

## 2018-02-19 PROCEDURE — 99214 OFFICE O/P EST MOD 30 MIN: CPT | Mod: PBBFAC,25,PO | Performed by: PHYSICIAN ASSISTANT

## 2018-02-19 RX ORDER — ATROPINE SULFATE 10 MG/G
OINTMENT OPHTHALMIC
Refills: 3 | Status: ON HOLD | COMMUNITY
Start: 2017-12-15 | End: 2018-03-06 | Stop reason: CLARIF

## 2018-02-19 RX ORDER — PREDNISONE 10 MG/1
TABLET ORAL
Status: ON HOLD | COMMUNITY
Start: 2017-12-31 | End: 2018-03-12

## 2018-02-19 RX ORDER — METRONIDAZOLE 7.5 MG/G
LOTION TOPICAL
Status: ON HOLD | COMMUNITY
Start: 2018-01-12 | End: 2018-04-10

## 2018-02-19 RX ORDER — ATROPINE SULFATE 10 MG/ML
SOLUTION/ DROPS OPHTHALMIC
Refills: 3 | Status: ON HOLD | COMMUNITY
Start: 2018-01-11 | End: 2018-03-06 | Stop reason: CLARIF

## 2018-02-19 NOTE — PROGRESS NOTES
Referring Physician: No ref. provider found    PCP: Jan Olivo MD    CC:  R shoulder and neck pain    Interval History:  Mrs. Dela Cruz is a 72 y.o. female with neck pain who presents today for f/u s/p cervical CHRISTOPHER. Reports 80% relief of neck and shoulder pain. Pain improved after 2 weeks. She denies any negative SEs. Cervical TPI caused increased pain. She c/o chronic low back pain today. Denies radiation down LE. Pain is worse in the AM or after prolonged sitting. She continues to take Gabapentin. Denies LE weakness or b/b changes. Pain today is rated 2/10.  Pt has been seen in the clinic before, however pt is new to me.     History below per Dr. Clancy    HPI:   Ayla Dela Cruz is a 72 y.o. female who presents as a new patient from Dr. Grubbs for 2.5 month history of R shoulder/mid back pain. The pain first began when she lifted some books at a bookstore in early November. She began feeling the pain in her R lateral neck/suprascapular region, with radiation down the back/side of her arm to the top of the elbow. She was treated with some topical creams, chiropractor adjustments, and then was placed on gabapentin by Dr. Grubbs, which has helped her pain a considerable amount. She states that the pain is intermittent, aching, sharp, shooting & radiates to her elbow. Worsened by sitting, or lying in bed. 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     ROS:  CONSTITUTIONAL: No fevers, chills, night sweats, wt. loss, appetite changes  SKIN: no rashes or itching  ENT: No headaches, head trauma, vision changes, or eye pain  LYMPH NODES: None noticed   CV: No chest pain, palpitations.   RESP: No shortness of breath, dyspnea on exertion, cough, wheezing, or hemoptysis  GI: No nausea, emesis, diarrhea, constipation, melena, hematochezia, pain.    : No dysuria, hematuria, urgency, or frequency   HEME: No easy bruising, bleeding problems  PSYCHIATRIC: No anxiety, psychosis, hallucinations. +ve depression  NEURO: No  seizures, memory loss, dizziness, +ve difficulty sleeping  MSK: +HPI      Past Medical History:   Diagnosis Date    Anticoagulant long-term use     Anxiety     Arthritis     Atrial fibrillation     Cancer     skin    CHF (congestive heart failure)     Depression     DVT (deep venous thrombosis)     GERD (gastroesophageal reflux disease)     Glaucoma (increased eye pressure)     Hyperlipidemia     diet controlled    Hypertension     Interstitial lung disease     Pneumonia 1/31/2014    Stroke 6-3-14    Stroke     TIA (transient ischemic attack)     TIA (transient ischemic attack)      Past Surgical History:   Procedure Laterality Date    2 heart ablations      3 in total    bilateral cataracts      CHOLECYSTECTOMY      COLONOSCOPY N/A 1/19/2017    Procedure: COLONOSCOPY and EGD;  Surgeon: Jonathan Schultz MD;  Location[facility] END[facility];  Service: Endoscopy;  Laterality: N/A;    pyloristenosis      skin cancer removal       TONSILLECTOMY       Family History   Problem Relation Age of Onset    Heart disease Father     Ulcers Father     Arthritis Mother     Asthma Mother     Rheum arthritis Mother     Pneumonia Mother     Depression Son     Alzheimer's disease Maternal Uncle     Rheum arthritis Maternal Grandmother     Emphysema Maternal Grandfather     Cancer Maternal Grandfather      kidney    Kidney disease Maternal Grandfather     Cancer Paternal Grandmother      lung= smoker    Pneumonia Paternal Grandfather     Breast cancer Neg Hx     Ovarian cancer Neg Hx      Social History     Social History    Marital status:      Spouse name: N/A    Number of children: N/A    Years of education: N/A     Social History Main Topics    Smoking status: Never Smoker    Smokeless tobacco: Never Used    Alcohol use No    Drug use: No    Sexual activity: Yes     Partners: Male     Other Topics Concern    None     Social History Narrative    None         Medications/Allergies: See med  "card    Vitals:    02/19/18 1012   BP: (!) 155/71   Pulse: (!) 58   Weight: 61.7 kg (136 lb)   Height: 5' 7" (1.702 m)   PainSc:   2   PainLoc: Neck         Physical exam:    GENERAL: A and O x3, the patient appears well groomed and is in no acute distress.  Skin: No rashes or obvious lesions  HEENT: normocephalic, atraumatic  CARDIOVASCULAR:  bradycardia  LUNGS: non labored breathing  ABDOMEN: soft, nontender   UPPER EXTREMITIES: Normal alignment, normal range of motion, no atrophy, no skin changes,  hair growth and nail growth normal and equal bilaterally. No swelling, no tenderness.    LOWER EXTREMITIES:  Normal alignment, normal range of motion, no atrophy, no skin changes,  hair growth and nail growth normal and equal bilaterally. No swelling, no tenderness.  CERVICAL SPINE:  Cervical spine: ROM is full in flexion, extension and lateral rotation without increased pain.  Spurling's maneuver causes no neck pain to either side.  Myofascial exam: tender to palpation along suprascapular muscle and anterior pressure of shoulder.  MENTAL STATUS: normal orientation, speech, language, and fund of knowledge for social situation.  Emotional state appropriate.  LUMBAR spine: muscle atrophy noted in left paraspinal muscles.   ROM full in flexion, extension, and lateral rotation   + facet loading on right.     CRANIAL NERVES:  II:  PERRL bilaterally,   III,IV,VI: EOMI.    V:  Facial sensation equal bilaterally  VII:  Facial motor function normal.  VIII:  Hearing equal to finger rub bilaterally  IX/X: Gag normal, palate symmetric  XI:  Shoulder shrug equal, head turn equal  XII:  Tongue midline without fasciculations      MOTOR: Tone and bulk: normal bilateral upper and lower Strength: normal   Delt Bi Tri WE WF     R 5 5 5 5 5 5   L 5 5 5 5 5 5     IP ADD ABD Quad TA Gas HAM  R 5 5 5 5 5 5 5  L 5 5 5 5 5 5 5    SENSATION: Light touch and pinprick intact bilaterally  REFLEXES: normal, symmetric, nonbrisk.  Toes down, no " clonus. No hoffmans.  GAIT: normal rise, base, steps, and arm swing.        Imaging:  MRI C-spine 12/2017  There is a 2 mm retrolisthesis of C4 on C5 and C6 on C7.  There is reversal of the normal cervical lordosis which could relate to neck muscle spasm.The cervical vertebral bodies show normal signal intensity and height with no indication of acute fracture or pathologic marrow replacement process.    There is degenerative disc desiccation at every cervical level with marginal osteophyte formation and disc space narrowing noted at C3-C4, C4-C5, C5-C6, and to a lesser extent, C6-C7.  There is congenital spinal canal narrowing present.    The cervical cord is normal in signal intensity at all levels with no indication of myelomalacia or cord edema. The incidentally observed soft tissues of the neck show no significant abnormalities.    C2-C3: There is bilateral hypertrophic facet arthropathy.  There is left ligamentum flavum thickening.  No definite acquired central canal or neuroforaminal stenosis.    C3-C4: There is a posterior disc osteophyte complex with a superimposed broad central disc protrusion which flattens the ventral cord.  There is ligamentum flavum thickening, bilateral uncovertebral spurring, and bilateral facet arthropathy, left greater than right.  There is moderate overall central canal stenosis, moderate-severe left neuroforaminal stenosis, and moderate right neuroforaminal stenosis.    C4-C5: There is a bulging posterior disc osteophyte complex, ligamentum flavum thickening, bilateral uncovertebral spurring, and facet arthropathy, right greater than left.  There is severe bilateral neural foraminal stenosis and moderate overall central canal stenosis.  No abnormal cord signal.  There is flattening of the ventral cord.    C5-C6: There is a posterior disc osteophyte complex and bilateral uncovertebral spurring, right greater than left.  No left neuroforaminal stenosis.  There is mild-moderate right  neuroforaminal stenosis and mild-moderate overall central canal stenosis.    C6-C7: There is a 2 mm retrolisthesis of C6 on C7 with uncovering of the intervertebral disc and a superimposed posterior disc osteophyte complex.  There is bilateral uncovertebral spurring, left greater than right.  There is severe left and moderate right neuroforaminal stenosis.  There is ligamentum flavum thickening.  There is moderate central canal stenosis with flattening of the ventral cord.    C7-T1: No central canal or neuroforaminal stenosis. No disc protrusion or extrusion.      Assessment:  Mrs. Dela Cruz is a 72 y.o. female with   1. Lumbar radiculopathy    2. Cervical radiculopathy    3. DDD (degenerative disc disease), cervical    4. Chronic midline low back pain without sciatica          Plan:  1.  I have stressed the importance of physical activity and exercise to improve overall health  2. Monitor progress and consider repeat cervical epidural steroid injection.   3. Lumbar xray  4. Schedule lumbar MBB at L3, L4, L5. If positive workup, will schedule RFA

## 2018-02-19 NOTE — TELEPHONE ENCOUNTER
Pt is scheduled for a lumbar medial branch block on 3/6/18. Pending good results she will need to hold her xarelto 2 days prior to radiofrequency ablation.

## 2018-02-22 ENCOUNTER — PATIENT MESSAGE (OUTPATIENT)
Dept: CARDIOLOGY | Facility: CLINIC | Age: 73
End: 2018-02-22

## 2018-02-26 DIAGNOSIS — M48.8X6 OTHER SPECIFIED SPONDYLOPATHIES, LUMBAR REGION: Primary | ICD-10-CM

## 2018-02-26 RX ORDER — SALSALATE 500 MG/1
TABLET, FILM COATED ORAL
Qty: 180 TABLET | Refills: 1 | Status: ON HOLD | OUTPATIENT
Start: 2018-02-26 | End: 2018-03-12

## 2018-03-06 ENCOUNTER — SURGERY (OUTPATIENT)
Age: 73
End: 2018-03-06

## 2018-03-06 ENCOUNTER — HOSPITAL ENCOUNTER (OUTPATIENT)
Facility: AMBULARY SURGERY CENTER | Age: 73
Discharge: HOME | End: 2018-03-06
Attending: ANESTHESIOLOGY | Admitting: ANESTHESIOLOGY
Payer: MEDICARE

## 2018-03-06 DIAGNOSIS — M47.896 OTHER SPONDYLOSIS, LUMBAR REGION: Primary | ICD-10-CM

## 2018-03-06 PROCEDURE — 64495 INJ PARAVERT F JNT L/S 3 LEV: CPT | Mod: LT | Performed by: ANESTHESIOLOGY

## 2018-03-06 PROCEDURE — 99152 MOD SED SAME PHYS/QHP 5/>YRS: CPT | Mod: ,,, | Performed by: ANESTHESIOLOGY

## 2018-03-06 PROCEDURE — G8907 PT DOC NO EVENTS ON DISCHARG: HCPCS | Performed by: ANESTHESIOLOGY

## 2018-03-06 PROCEDURE — 64493 INJ PARAVERT F JNT L/S 1 LEV: CPT | Mod: 50,,, | Performed by: ANESTHESIOLOGY

## 2018-03-06 PROCEDURE — 64494 INJ PARAVERT F JNT L/S 2 LEV: CPT | Mod: RT | Performed by: ANESTHESIOLOGY

## 2018-03-06 PROCEDURE — 64494 INJ PARAVERT F JNT L/S 2 LEV: CPT | Mod: 50,,, | Performed by: ANESTHESIOLOGY

## 2018-03-06 PROCEDURE — 64493 INJ PARAVERT F JNT L/S 1 LEV: CPT | Mod: RT | Performed by: ANESTHESIOLOGY

## 2018-03-06 RX ORDER — SODIUM CHLORIDE, SODIUM LACTATE, POTASSIUM CHLORIDE, CALCIUM CHLORIDE 600; 310; 30; 20 MG/100ML; MG/100ML; MG/100ML; MG/100ML
INJECTION, SOLUTION INTRAVENOUS ONCE AS NEEDED
Status: COMPLETED | OUTPATIENT
Start: 2018-03-06 | End: 2018-03-06

## 2018-03-06 RX ORDER — MIDAZOLAM HYDROCHLORIDE 1 MG/ML
INJECTION INTRAMUSCULAR; INTRAVENOUS
Status: DISCONTINUED
Start: 2018-03-06 | End: 2018-03-06 | Stop reason: HOSPADM

## 2018-03-06 RX ORDER — BUPIVACAINE HYDROCHLORIDE 5 MG/ML
INJECTION, SOLUTION EPIDURAL; INTRACAUDAL
Status: DISCONTINUED
Start: 2018-03-06 | End: 2018-03-06 | Stop reason: HOSPADM

## 2018-03-06 RX ORDER — MIDAZOLAM HYDROCHLORIDE 2 MG/2ML
INJECTION, SOLUTION INTRAMUSCULAR; INTRAVENOUS
Status: DISCONTINUED | OUTPATIENT
Start: 2018-03-06 | End: 2018-03-06 | Stop reason: HOSPADM

## 2018-03-06 RX ORDER — BUPIVACAINE HYDROCHLORIDE 5 MG/ML
INJECTION, SOLUTION EPIDURAL; INTRACAUDAL
Status: DISCONTINUED | OUTPATIENT
Start: 2018-03-06 | End: 2018-03-06 | Stop reason: HOSPADM

## 2018-03-06 RX ADMIN — MIDAZOLAM HYDROCHLORIDE 2 MG: 2 INJECTION, SOLUTION INTRAMUSCULAR; INTRAVENOUS at 12:03

## 2018-03-06 RX ADMIN — BUPIVACAINE HYDROCHLORIDE 5 ML: 5 INJECTION, SOLUTION EPIDURAL; INTRACAUDAL at 12:03

## 2018-03-06 RX ADMIN — SODIUM CHLORIDE, SODIUM LACTATE, POTASSIUM CHLORIDE, CALCIUM CHLORIDE: 600; 310; 30; 20 INJECTION, SOLUTION INTRAVENOUS at 12:03

## 2018-03-06 NOTE — DISCHARGE INSTRUCTIONS
Recovery After Procedural Sedation (Adult)  You have been given medicine by vein to make you sleep during your surgery. This may have included both a pain medicine and sleeping medicine. Most of the effects have worn off. But you may still have some drowsiness for the next 6 to 8 hours.  Home care  Follow these guidelines when you get home:  · For the next 8 hours, you should be watched by a responsible adult. This person should make sure your condition is not getting worse.  · Don't drink any alcohol for the next 24 hours.  · Don't drive, operate dangerous machinery, or make important business or personal decisions during the next 24 hours.  Note: Your healthcare provider may tell you not to take any medicine by mouth for pain or sleep in the next 4 hours. These medicines may react with the medicines you were given in the hospital. This could cause a much stronger response than usual.  Follow-up care  Follow up with your healthcare provider if you are not alert and back to your usual level of activity within 12 hours.  When to seek medical advice  Call your healthcare provider right away if any of these occur:  · Drowsiness gets worse  · Weakness or dizziness gets worse  · Repeated vomiting  · You can't be awakened   Date Last Reviewed: 10/18/2016  © 7845-9250 [employer]. [address] [place] [address], [place] [address]. All rights reserved. This information is not intended as a substitute for professional medical care. Always follow your healthcare professional's instructions.      Nerve Block  This was a test, not a treatment. Your pain may return.  Keep your pain journal Dr office will call to check your pain levels within 3 days    Home care instructions  You may apply ice pack to the injection site for 2 days , NO HEAT for 2 days  You may take a shower but no soaking above level of injection in tub or pool for 2 days  Resume Aspirin, Plavix, or Coumadin the day after the procedure unless otherwise  instructed.  Gradually increase activity  Do not drive for 24 hr  If diabetic monitor your glucose carefully. Follow up with your primary MD if this is a problem    SEEK  IMMEDIATE MEDICAL HELP FOR:  Severe increase in your usual pain or appearance of new pain  Prolonged or increasing weakness or numbness in the legs or arms  Drainage, redness, active bleeding, or increased swelling at the injection site  Temperature over 100.0 degrees F.  Headache that increases when your head is upright and decreases when you lie flat  Shortness of breath, chest pain, or problems breathing

## 2018-03-06 NOTE — H&P (VIEW-ONLY)
Referring Physician: No ref. provider found    PCP: Jan Olivo MD    CC:  R shoulder and neck pain    Interval History:  Mrs. Dela Cruz is a 72 y.o. female with neck pain who presents today for f/u s/p cervical CHRISTOPHER. Reports 80% relief of neck and shoulder pain. Pain improved after 2 weeks. She denies any negative SEs. Cervical TPI caused increased pain. She c/o chronic low back pain today. Denies radiation down LE. Pain is worse in the AM or after prolonged sitting. She continues to take Gabapentin. Denies LE weakness or b/b changes. Pain today is rated 2/10.  Pt has been seen in the clinic before, however pt is new to me.     History below per Dr. Clancy    HPI:   Ayla Dela Cruz is a 72 y.o. female who presents as a new patient from Dr. Grubbs for 2.5 month history of R shoulder/mid back pain. The pain first began when she lifted some books at a bookstore in early November. She began feeling the pain in her R lateral neck/suprascapular region, with radiation down the back/side of her arm to the top of the elbow. She was treated with some topical creams, chiropractor adjustments, and then was placed on gabapentin by Dr. Grubbs, which has helped her pain a considerable amount. She states that the pain is intermittent, aching, sharp, shooting & radiates to her elbow. Worsened by sitting, or lying in bed. 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     ROS:  CONSTITUTIONAL: No fevers, chills, night sweats, wt. loss, appetite changes  SKIN: no rashes or itching  ENT: No headaches, head trauma, vision changes, or eye pain  LYMPH NODES: None noticed   CV: No chest pain, palpitations.   RESP: No shortness of breath, dyspnea on exertion, cough, wheezing, or hemoptysis  GI: No nausea, emesis, diarrhea, constipation, melena, hematochezia, pain.    : No dysuria, hematuria, urgency, or frequency   HEME: No easy bruising, bleeding problems  PSYCHIATRIC: No anxiety, psychosis, hallucinations. +ve depression  NEURO: No  seizures, memory loss, dizziness, +ve difficulty sleeping  MSK: +HPI      Past Medical History:   Diagnosis Date    Anticoagulant long-term use     Anxiety     Arthritis     Atrial fibrillation     Cancer     skin    CHF (congestive heart failure)     Depression     DVT (deep venous thrombosis)     GERD (gastroesophageal reflux disease)     Glaucoma (increased eye pressure)     Hyperlipidemia     diet controlled    Hypertension     Interstitial lung disease     Pneumonia 1/31/2014    Stroke 6-3-14    Stroke     TIA (transient ischemic attack)     TIA (transient ischemic attack)      Past Surgical History:   Procedure Laterality Date    2 heart ablations      3 in total    bilateral cataracts      CHOLECYSTECTOMY      COLONOSCOPY N/A 1/19/2017    Procedure: COLONOSCOPY and EGD;  Surgeon: Jonathan Schultz MD;  Location: [facility] ENDO;  Service: Endoscopy;  Laterality: N/A;    pyloristenosis      skin cancer removal       TONSILLECTOMY       Family History   Problem Relation Age of Onset    Heart disease Father     Ulcers Father     Arthritis Mother     Asthma Mother     Rheum arthritis Mother     Pneumonia Mother     Depression Son     Alzheimer's disease Maternal Uncle     Rheum arthritis Maternal Grandmother     Emphysema Maternal Grandfather     Cancer Maternal Grandfather      kidney    Kidney disease Maternal Grandfather     Cancer Paternal Grandmother      lung= smoker    Pneumonia Paternal Grandfather     Breast cancer Neg Hx     Ovarian cancer Neg Hx      Social History     Social History    Marital status:      Spouse name: N/A    Number of children: N/A    Years of education: N/A     Social History Main Topics    Smoking status: Never Smoker    Smokeless tobacco: Never Used    Alcohol use No    Drug use: No    Sexual activity: Yes     Partners: Male     Other Topics Concern    None     Social History Narrative    None         Medications/Allergies: See med  "card    Vitals:    02/19/18 1012   BP: (!) 155/71   Pulse: (!) 58   Weight: 61.7 kg (136 lb)   Height: 5' 7" (1.702 m)   PainSc:   2   PainLoc: Neck         Physical exam:    GENERAL: A and O x3, the patient appears well groomed and is in no acute distress.  Skin: No rashes or obvious lesions  HEENT: normocephalic, atraumatic  CARDIOVASCULAR:  bradycardia  LUNGS: non labored breathing  ABDOMEN: soft, nontender   UPPER EXTREMITIES: Normal alignment, normal range of motion, no atrophy, no skin changes,  hair growth and nail growth normal and equal bilaterally. No swelling, no tenderness.    LOWER EXTREMITIES:  Normal alignment, normal range of motion, no atrophy, no skin changes,  hair growth and nail growth normal and equal bilaterally. No swelling, no tenderness.  CERVICAL SPINE:  Cervical spine: ROM is full in flexion, extension and lateral rotation without increased pain.  Spurling's maneuver causes no neck pain to either side.  Myofascial exam: tender to palpation along suprascapular muscle and anterior pressure of shoulder.  MENTAL STATUS: normal orientation, speech, language, and fund of knowledge for social situation.  Emotional state appropriate.  LUMBAR spine: muscle atrophy noted in left paraspinal muscles.   ROM full in flexion, extension, and lateral rotation   + facet loading on right.     CRANIAL NERVES:  II:  PERRL bilaterally,   III,IV,VI: EOMI.    V:  Facial sensation equal bilaterally  VII:  Facial motor function normal.  VIII:  Hearing equal to finger rub bilaterally  IX/X: Gag normal, palate symmetric  XI:  Shoulder shrug equal, head turn equal  XII:  Tongue midline without fasciculations      MOTOR: Tone and bulk: normal bilateral upper and lower Strength: normal   Delt Bi Tri WE WF     R 5 5 5 5 5 5   L 5 5 5 5 5 5     IP ADD ABD Quad TA Gas HAM  R 5 5 5 5 5 5 5  L 5 5 5 5 5 5 5    SENSATION: Light touch and pinprick intact bilaterally  REFLEXES: normal, symmetric, nonbrisk.  Toes down, no " clonus. No hoffmans.  GAIT: normal rise, base, steps, and arm swing.        Imaging:  MRI C-spine 12/2017  There is a 2 mm retrolisthesis of C4 on C5 and C6 on C7.  There is reversal of the normal cervical lordosis which could relate to neck muscle spasm.The cervical vertebral bodies show normal signal intensity and height with no indication of acute fracture or pathologic marrow replacement process.    There is degenerative disc desiccation at every cervical level with marginal osteophyte formation and disc space narrowing noted at C3-C4, C4-C5, C5-C6, and to a lesser extent, C6-C7.  There is congenital spinal canal narrowing present.    The cervical cord is normal in signal intensity at all levels with no indication of myelomalacia or cord edema. The incidentally observed soft tissues of the neck show no significant abnormalities.    C2-C3: There is bilateral hypertrophic facet arthropathy.  There is left ligamentum flavum thickening.  No definite acquired central canal or neuroforaminal stenosis.    C3-C4: There is a posterior disc osteophyte complex with a superimposed broad central disc protrusion which flattens the ventral cord.  There is ligamentum flavum thickening, bilateral uncovertebral spurring, and bilateral facet arthropathy, left greater than right.  There is moderate overall central canal stenosis, moderate-severe left neuroforaminal stenosis, and moderate right neuroforaminal stenosis.    C4-C5: There is a bulging posterior disc osteophyte complex, ligamentum flavum thickening, bilateral uncovertebral spurring, and facet arthropathy, right greater than left.  There is severe bilateral neural foraminal stenosis and moderate overall central canal stenosis.  No abnormal cord signal.  There is flattening of the ventral cord.    C5-C6: There is a posterior disc osteophyte complex and bilateral uncovertebral spurring, right greater than left.  No left neuroforaminal stenosis.  There is mild-moderate right  neuroforaminal stenosis and mild-moderate overall central canal stenosis.    C6-C7: There is a 2 mm retrolisthesis of C6 on C7 with uncovering of the intervertebral disc and a superimposed posterior disc osteophyte complex.  There is bilateral uncovertebral spurring, left greater than right.  There is severe left and moderate right neuroforaminal stenosis.  There is ligamentum flavum thickening.  There is moderate central canal stenosis with flattening of the ventral cord.    C7-T1: No central canal or neuroforaminal stenosis. No disc protrusion or extrusion.      Assessment:  Mrs. Dela Cruz is a 72 y.o. female with   1. Lumbar radiculopathy    2. Cervical radiculopathy    3. DDD (degenerative disc disease), cervical    4. Chronic midline low back pain without sciatica          Plan:  1.  I have stressed the importance of physical activity and exercise to improve overall health  2. Monitor progress and consider repeat cervical epidural steroid injection.   3. Lumbar xray  4. Schedule lumbar MBB at L3, L4, L5. If positive workup, will schedule RFA

## 2018-03-06 NOTE — INTERVAL H&P NOTE
The patient has been examined and the H&P has been reviewed:    I concur with the findings and no changes have occurred since H&P was written.   .This patient has been cleared for surgery in an ambulatory surgical facility    ASA 3,  MP3  No history of anesthetic complications  Plan for RN IV sedation      Anesthesia/Surgery risks, benefits and alternative options discussed and understood by patient/family.          Active Hospital Problems    Diagnosis  POA    Other spondylosis, lumbar region [M47.896]  Yes      Resolved Hospital Problems    Diagnosis Date Resolved POA   No resolved problems to display.

## 2018-03-06 NOTE — OP NOTE
PROCEDURE DATE: 3/6/2018    PROCEDURE:  Bilateral L3,4,5 medial branch nerve blocks    DIAGNOSIS:  Other lumbar spondylosis    Post Op diagnosis: Same    PHYSICIAN: Galo Clancy MD    MEDICATIONS INJECTED: 0.5% bupivicaine, 0.5 ml at each level    SEDATION MEDICATIONS:RN IV Versed    LOCAL ANESTHETIC USED: None    ESTIMATED BLOOD LOSS:  None    COMPLICATIONS:  None    TECHNIQUE: A time out was taken to identify the patient, procedure and side of the procedure. The patient was placed in a prone position, then prepped and draped in the usual sterile fashion using ChloraPrep and sterile towels.  The levels were determined under fluoroscopic guidance and then marked.  A 25-gauge 3.5 inch needle was introduced to the anatomic location of the L3,4,5 medial branch nerves on the bilateral side. The above medication was then injected. The patient tolerated the procedure well.     The patient was monitored after the procedure. Patient was given pain diary to record pain levels at home.     If found to have greater than a 50% recovery and so will be scheduled for a radiofrequency ablation of the corresponding nerves.  Patient was given post procedure and discharge instructions to follow at home.  The patient was discharged in a stable condition.

## 2018-03-06 NOTE — DISCHARGE SUMMARY
[facility]  Discharge Note  Short Stay    Admit Date: 3/6/2018    Discharge Date and Time: 3/6/2018    Attending Physician: Galo Clancy MD     Discharge Provider: Galo Clancy    Diagnoses:  Active Hospital Problems    Diagnosis  POA    *Other spondylosis, lumbar region [M47.896]  Yes      Resolved Hospital Problems    Diagnosis Date Resolved POA   No resolved problems to display.       Hospital Course: Lumbar MBB  Discharged Condition: Good    Final Diagnoses:   Active Hospital Problems    Diagnosis  POA    *Other spondylosis, lumbar region [M47.896]  Yes      Resolved Hospital Problems    Diagnosis Date Resolved POA   No resolved problems to display.       Disposition: Home or Self Care    Follow up/Patient Instructions:    Medications:  Reconciled Home Medications:   Current Discharge Medication List      CONTINUE these medications which have NOT CHANGED    Details   losartan (COZAAR) 100 MG tablet TAKE 1 TABLET (100 MG TOTAL) BY MOUTH ONCE DAILY.  Qty: 90 tablet, Refills: 2      metoprolol tartrate (LOPRESSOR) 50 MG Tab Take 1 tablet (50 mg total) by mouth 2 (two) times daily.  Qty: 90 tablet, Refills: 4      VIIBRYD 40 mg Tab tablet Take 40 mg by mouth once daily.  Refills: 2      ammonium lactate (LAC-HYDRIN) 12 % lotion       ATROPINE SULFATE, PF, OPHT Apply to eye.      dorzolamide-timolol (COSOPT) 2-0.5 % ophthalmic solution Place 1 drop into both eyes 2 (two) times daily. Twice a day      gabapentin (NEURONTIN) 300 MG capsule       lorazepam (ATIVAN) 1 MG tablet TAKE 1 TABLET BY MOUTH DAILY  Qty: 30 tablet, Refills: 3    Comments: This request is for a new prescription for a controlled substance as required by Federal/State law..  Associated Diagnoses: Anxiety      metroNIDAZOLE (METROGEL) 0.75 % gel Apply topically 2 (two) times daily.      metroNIDAZOLE 0.75 % Lotn       minocycline (MINOCIN,DYNACIN) 100 MG capsule Take 100 mg by mouth 2 (two) times daily.      pantoprazole (PROTONIX) 40 MG  tablet Take 1 tablet (40 mg total) by mouth once daily.  Qty: 90 tablet, Refills: 3      predniSONE (DELTASONE) 10 MG tablet       rosuvastatin (CRESTOR) 40 MG Tab TAKE 1 TABLET (40 MG TOTAL) BY MOUTH EVERY EVENING.  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  Qty: 30 tablet, Refills: 6             Discharge Procedure Orders  Call MD for:  temperature >100.4     Call MD for:  persistent nausea and vomiting or diarrhea     Call MD for:  severe uncontrolled pain     Call MD for:  redness, tenderness, or signs of infection (pain, swelling, redness, odor or green/yellow discharge around incision site)     Call MD for:  difficulty breathing or increased cough     Call MD for:  severe persistent headache          Follow up with MD in 2-3 weeks    Discharge Procedure Orders (must include Diet, Follow-up, Activity):    Discharge Procedure Orders (must include Diet, Follow-up, Activity)  Call MD for:  temperature >100.4     Call MD for:  persistent nausea and vomiting or diarrhea     Call MD for:  severe uncontrolled pain     Call MD for:  redness, tenderness, or signs of infection (pain, swelling, redness, odor or green/yellow discharge around incision site)     Call MD for:  difficulty breathing or increased cough     Call MD for:  severe persistent headache

## 2018-03-06 NOTE — PLAN OF CARE
Pt states ready to go home , stable, gaurav po fluids, ambulatory, denies pain, Leg raises performed in bed without diff and instructed on fall risk. PT and spouse verbalized understanding

## 2018-03-07 ENCOUNTER — TELEPHONE (OUTPATIENT)
Dept: PAIN MEDICINE | Facility: CLINIC | Age: 73
End: 2018-03-07

## 2018-03-07 VITALS
HEART RATE: 68 BPM | SYSTOLIC BLOOD PRESSURE: 156 MMHG | HEIGHT: 67 IN | OXYGEN SATURATION: 83 % | RESPIRATION RATE: 18 BRPM | WEIGHT: 136 LBS | BODY MASS INDEX: 21.35 KG/M2 | TEMPERATURE: 98 F | DIASTOLIC BLOOD PRESSURE: 73 MMHG

## 2018-03-07 DIAGNOSIS — M48.8X6 OTHER SPECIFIED SPONDYLOPATHIES, LUMBAR REGION: Primary | ICD-10-CM

## 2018-03-08 RX ORDER — GABAPENTIN 300 MG/1
CAPSULE ORAL
Qty: 30 CAPSULE | Refills: 2 | Status: SHIPPED | OUTPATIENT
Start: 2018-03-08 | End: 2018-05-29 | Stop reason: SDUPTHER

## 2018-03-12 ENCOUNTER — HOSPITAL ENCOUNTER (OUTPATIENT)
Facility: AMBULARY SURGERY CENTER | Age: 73
Discharge: HOME | End: 2018-03-12
Attending: ANESTHESIOLOGY | Admitting: ANESTHESIOLOGY
Payer: MEDICARE

## 2018-03-12 ENCOUNTER — SURGERY (OUTPATIENT)
Age: 73
End: 2018-03-12

## 2018-03-12 DIAGNOSIS — M47.896 OTHER SPONDYLOSIS, LUMBAR REGION: Primary | ICD-10-CM

## 2018-03-12 PROCEDURE — 64636 DESTROY L/S FACET JNT ADDL: CPT | Mod: RT | Performed by: ANESTHESIOLOGY

## 2018-03-12 PROCEDURE — 64635 DESTROY LUMB/SAC FACET JNT: CPT | Mod: RT | Performed by: ANESTHESIOLOGY

## 2018-03-12 PROCEDURE — G8907 PT DOC NO EVENTS ON DISCHARG: HCPCS | Performed by: ANESTHESIOLOGY

## 2018-03-12 PROCEDURE — 64636 DESTROY L/S FACET JNT ADDL: CPT | Mod: 50,,, | Performed by: ANESTHESIOLOGY

## 2018-03-12 PROCEDURE — 99152 MOD SED SAME PHYS/QHP 5/>YRS: CPT | Mod: ,,, | Performed by: ANESTHESIOLOGY

## 2018-03-12 PROCEDURE — 64635 DESTROY LUMB/SAC FACET JNT: CPT | Mod: 50,,, | Performed by: ANESTHESIOLOGY

## 2018-03-12 RX ORDER — LIDOCAINE HYDROCHLORIDE 10 MG/ML
INJECTION, SOLUTION EPIDURAL; INFILTRATION; INTRACAUDAL; PERINEURAL
Status: DISCONTINUED
Start: 2018-03-12 | End: 2018-03-12 | Stop reason: HOSPADM

## 2018-03-12 RX ORDER — BUPIVACAINE HYDROCHLORIDE 2.5 MG/ML
INJECTION, SOLUTION EPIDURAL; INFILTRATION; INTRACAUDAL
Status: DISCONTINUED | OUTPATIENT
Start: 2018-03-12 | End: 2018-03-12 | Stop reason: HOSPADM

## 2018-03-12 RX ORDER — BUPIVACAINE HYDROCHLORIDE 2.5 MG/ML
INJECTION, SOLUTION EPIDURAL; INFILTRATION; INTRACAUDAL
Status: DISCONTINUED
Start: 2018-03-12 | End: 2018-03-12 | Stop reason: HOSPADM

## 2018-03-12 RX ORDER — SODIUM CHLORIDE, SODIUM LACTATE, POTASSIUM CHLORIDE, CALCIUM CHLORIDE 600; 310; 30; 20 MG/100ML; MG/100ML; MG/100ML; MG/100ML
INJECTION, SOLUTION INTRAVENOUS ONCE AS NEEDED
Status: COMPLETED | OUTPATIENT
Start: 2018-03-12 | End: 2018-03-12

## 2018-03-12 RX ORDER — PREDNISONE 10 MG/1
10 TABLET ORAL DAILY
Qty: 30 TABLET | Refills: 0 | Status: SHIPPED | OUTPATIENT
Start: 2018-03-12 | End: 2018-03-26

## 2018-03-12 RX ORDER — LIDOCAINE HYDROCHLORIDE 10 MG/ML
INJECTION, SOLUTION EPIDURAL; INFILTRATION; INTRACAUDAL; PERINEURAL
Status: DISCONTINUED | OUTPATIENT
Start: 2018-03-12 | End: 2018-03-12 | Stop reason: HOSPADM

## 2018-03-12 RX ORDER — DEXAMETHASONE SODIUM PHOSPHATE 10 MG/ML
INJECTION INTRAMUSCULAR; INTRAVENOUS
Status: DISCONTINUED | OUTPATIENT
Start: 2018-03-12 | End: 2018-03-12 | Stop reason: HOSPADM

## 2018-03-12 RX ORDER — METHYLPREDNISOLONE ACETATE 80 MG/ML
INJECTION, SUSPENSION INTRA-ARTICULAR; INTRALESIONAL; INTRAMUSCULAR; SOFT TISSUE
Status: DISCONTINUED | OUTPATIENT
Start: 2018-03-12 | End: 2018-03-12 | Stop reason: HOSPADM

## 2018-03-12 RX ORDER — LIDOCAINE HYDROCHLORIDE 20 MG/ML
INJECTION, SOLUTION EPIDURAL; INFILTRATION; INTRACAUDAL; PERINEURAL
Status: DISCONTINUED
Start: 2018-03-12 | End: 2018-03-12 | Stop reason: HOSPADM

## 2018-03-12 RX ORDER — FENTANYL CITRATE 50 UG/ML
INJECTION, SOLUTION INTRAMUSCULAR; INTRAVENOUS
Status: DISCONTINUED
Start: 2018-03-12 | End: 2018-03-12 | Stop reason: HOSPADM

## 2018-03-12 RX ORDER — LIDOCAINE HYDROCHLORIDE 20 MG/ML
INJECTION, SOLUTION EPIDURAL; INFILTRATION; INTRACAUDAL; PERINEURAL
Status: DISCONTINUED | OUTPATIENT
Start: 2018-03-12 | End: 2018-03-12 | Stop reason: HOSPADM

## 2018-03-12 RX ORDER — METHYLPREDNISOLONE ACETATE 80 MG/ML
INJECTION, SUSPENSION INTRA-ARTICULAR; INTRALESIONAL; INTRAMUSCULAR; SOFT TISSUE
Status: DISCONTINUED
Start: 2018-03-12 | End: 2018-03-12 | Stop reason: HOSPADM

## 2018-03-12 RX ORDER — FENTANYL CITRATE 50 UG/ML
INJECTION, SOLUTION INTRAMUSCULAR; INTRAVENOUS
Status: DISCONTINUED | OUTPATIENT
Start: 2018-03-12 | End: 2018-03-12 | Stop reason: HOSPADM

## 2018-03-12 RX ORDER — MIDAZOLAM HYDROCHLORIDE 2 MG/2ML
INJECTION, SOLUTION INTRAMUSCULAR; INTRAVENOUS
Status: DISCONTINUED | OUTPATIENT
Start: 2018-03-12 | End: 2018-03-12 | Stop reason: HOSPADM

## 2018-03-12 RX ORDER — MIDAZOLAM HYDROCHLORIDE 1 MG/ML
INJECTION INTRAMUSCULAR; INTRAVENOUS
Status: DISCONTINUED
Start: 2018-03-12 | End: 2018-03-12 | Stop reason: HOSPADM

## 2018-03-12 RX ADMIN — MIDAZOLAM HYDROCHLORIDE 2 MG: 2 INJECTION, SOLUTION INTRAMUSCULAR; INTRAVENOUS at 01:03

## 2018-03-12 RX ADMIN — SODIUM CHLORIDE, SODIUM LACTATE, POTASSIUM CHLORIDE, CALCIUM CHLORIDE: 600; 310; 30; 20 INJECTION, SOLUTION INTRAVENOUS at 01:03

## 2018-03-12 RX ADMIN — BUPIVACAINE HYDROCHLORIDE 6 ML: 2.5 INJECTION, SOLUTION EPIDURAL; INFILTRATION; INTRACAUDAL at 01:03

## 2018-03-12 RX ADMIN — FENTANYL CITRATE 50 MCG: 50 INJECTION, SOLUTION INTRAMUSCULAR; INTRAVENOUS at 01:03

## 2018-03-12 RX ADMIN — LIDOCAINE HYDROCHLORIDE 6 ML: 20 INJECTION, SOLUTION EPIDURAL; INFILTRATION; INTRACAUDAL; PERINEURAL at 01:03

## 2018-03-12 RX ADMIN — METHYLPREDNISOLONE ACETATE 80 MG: 80 INJECTION, SUSPENSION INTRA-ARTICULAR; INTRALESIONAL; INTRAMUSCULAR; SOFT TISSUE at 01:03

## 2018-03-12 RX ADMIN — LIDOCAINE HYDROCHLORIDE 10 ML: 10 INJECTION, SOLUTION EPIDURAL; INFILTRATION; INTRACAUDAL; PERINEURAL at 01:03

## 2018-03-12 NOTE — DISCHARGE INSTRUCTIONS
Recovery After Procedural Sedation (Adult)  You have been given medicine by vein to make you sleep during your surgery. This may have included both a pain medicine and sleeping medicine. Most of the effects have worn off. But you may still have some drowsiness for the next 6 to 8 hours.  Home care  Follow these guidelines when you get home:  · For the next 8 hours, you should be watched by a responsible adult. This person should make sure your condition is not getting worse.  · Don't drink any alcohol for the next 24 hours.  · Don't drive, operate dangerous machinery, or make important business or personal decisions during the next 24 hours.  Note: Your healthcare provider may tell you not to take any medicine by mouth for pain or sleep in the next 4 hours. These medicines may react with the medicines you were given in the hospital. This could cause a much stronger response than usual.  Follow-up care  Follow up with your healthcare provider if you are not alert and back to your usual level of activity within 12 hours.  When to seek medical advice  Call your healthcare provider right away if any of these occur:  · Drowsiness gets worse  · Weakness or dizziness gets worse  · Repeated vomiting  · You can't be awakened   Date Last Reviewed: 10/18/2016  © 3798-1537 [employer]. [address] [place] [address], [place] [address]. All rights reserved. This information is not intended as a substitute for professional medical care. Always follow your healthcare professional's instructions.      RADIOFREQUENCY/Pain injection instructions:     Steroids take about a week to relieve pain.  Initially you may get pain relief from the local anesthetic but this may wear off before the steroid works.    No driving for 24 hrs   Activity as tolerated- gradually increase activities.  Dont lift over 10 lbs for 24 hrs   No heat at injection sites x 2 days. No hot tubs, swimming pools, saunas or heating pads for 2 days.  Use ice  pack for mild swelling and for comfort at 20 minute intervals, 20 minutes on 20 minutes off.  May shower today. No tub baths for two days.      Resume Aspirin, Plavix, or Coumadin the day after the procedure unless otherwise instructed.   If diabetic,monitor your glucose carefully as steroids can increase glucose level    Seek immediate medical help for:   Severe increase in your usual pain or appearance of new pain.  Prolonged or increasing weakness or numbness in the legs or arms.    - Numbing medicine was injected that affects nerves that carry information from  the     muscles to brain and the brain to the muscles.  This numbness can last 4-6 hrs so be very careful, and get assistance when standing or walking.   Fever above 101 ,Drainage,redness,active bleeding, or increased swelling at the injection site.  Headache, shortness of breath, chest pain, or breathing problems.

## 2018-03-12 NOTE — OP NOTE
PROCEDURE DATE: 3/12/2018    PROCEDURE:  Radiofrequency ablation of the L3,4,5 medial branch nerves on the bilateral-side utilizing fluoroscopy    DIAGNOSIS:  Other lumbar spondylosis  Post op Diagnosis: Same    PHYSICIAN: Galo Clancy MD    MEDICATIONS INJECTED:  From a mixture of 6ml of 0.25% bupivicaine and 10mg of dexamethasone,  1ml of this solution was injected at each level.    LOCAL ANESTHETIC USED: Lidocaine 1%, 2 ml given at each site.    SEDATION MEDICATIONS: RN IV sedation    ESTIMATED BLOOD LOSS:  None    COMPLICATIONS:  None    TECHNIQUE:  A time out was taken to identify patient and procedure side prior to starting the procedure. Laying in a prone position, the patient was prepped and draped in the usual sterile fashion using ChloraPrep and sterile towels.  The levels were determined under fluoroscopic guidance and then marked.  Local anesthetic was given by raising a wheal at the skin over each site and then infiltrated approximately 2cm deeper.  A 20-gauge  100 mm RF needle was introduced to the anatomic location of the bilateral L3,4,5 medial branch nerves. Motor stimulation up to 2 Volts at each level confirmed no motor nerve involvement.  Impedance was less than 800 ohms at each level.  1ml of 2% lidocaine was instilled prior to lesioning.  Ablation was performed per level utilizing radiofrequency generator 80°C for 60 seconds.  Needles were then rotated 90° and a second lesion was performed.  The above noted medication was then injected slowly. The patient tolerated the procedure well.     The patient was monitored after the procedure.  Patient was given post procedure and discharge instructions to follow at home.  The patient was discharged in a stable condition

## 2018-03-12 NOTE — DISCHARGE SUMMARY
[facility]  Discharge Note  Short Stay    Admit Date: 3/12/2018    Discharge Date and Time: 3/12/2018    Attending Physician: Galo Clancy MD     Discharge Provider: Galo Clancy    Diagnoses:  Active Hospital Problems    Diagnosis  POA    *Other spondylosis, lumbar region [M47.896]  Yes      Resolved Hospital Problems    Diagnosis Date Resolved POA   No resolved problems to display.       Hospital Course: Lumbar MB RFA  Discharged Condition: Good    Final Diagnoses:   Active Hospital Problems    Diagnosis  POA    *Other spondylosis, lumbar region [M47.896]  Yes      Resolved Hospital Problems    Diagnosis Date Resolved POA   No resolved problems to display.       Disposition: Home or Self Care    Follow up/Patient Instructions:    Medications:  Reconciled Home Medications:   Current Discharge Medication List      CONTINUE these medications which have NOT CHANGED    Details   losartan (COZAAR) 100 MG tablet TAKE 1 TABLET (100 MG TOTAL) BY MOUTH ONCE DAILY.  Qty: 90 tablet, Refills: 2      metoprolol tartrate (LOPRESSOR) 50 MG Tab Take 1 tablet (50 mg total) by mouth 2 (two) times daily.  Qty: 90 tablet, Refills: 4      ammonium lactate (LAC-HYDRIN) 12 % lotion       ATROPINE SULFATE, PF, OPHT Apply to eye.      dorzolamide-timolol (COSOPT) 2-0.5 % ophthalmic solution Place 1 drop into both eyes 2 (two) times daily. Twice a day      gabapentin (NEURONTIN) 300 MG capsule TAKE ONE CAPSULE BY MOUTH ONCE DAILY IN THE EVENING  Qty: 30 capsule, Refills: 2      lorazepam (ATIVAN) 1 MG tablet TAKE 1 TABLET BY MOUTH DAILY  Qty: 30 tablet, Refills: 3    Comments: This request is for a new prescription for a controlled substance as required by Federal/State law..  Associated Diagnoses: Anxiety      metroNIDAZOLE (METROGEL) 0.75 % gel Apply topically 2 (two) times daily.      metroNIDAZOLE 0.75 % Lotn       minocycline (MINOCIN,DYNACIN) 100 MG capsule Take 100 mg by mouth 2 (two) times daily.      pantoprazole  (PROTONIX) 40 MG tablet Take 1 tablet (40 mg total) by mouth once daily.  Qty: 90 tablet, Refills: 3      rosuvastatin (CRESTOR) 40 MG Tab TAKE 1 TABLET (40 MG TOTAL) BY MOUTH EVERY EVENING.  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  Refills: 2      XARELTO 20 mg Tab TAKE 1 TABLET (20 MG TOTAL) BY MOUTH DAILY WITH DINNER OR EVENING MEAL.  Qty: 30 tablet, Refills: 6         STOP taking these medications       salsalate (DISALCID) 500 MG Tab Comments:   Reason for Stopping:         predniSONE (DELTASONE) 10 MG tablet Comments:   Reason for Stopping:               Discharge Procedure Orders  Call MD for:  temperature >100.4     Call MD for:  persistent nausea and vomiting or diarrhea     Call MD for:  severe uncontrolled pain     Call MD for:  redness, tenderness, or signs of infection (pain, swelling, redness, odor or green/yellow discharge around incision site)     Call MD for:  difficulty breathing or increased cough     Call MD for:  severe persistent headache          Follow up with MD in 2-3 weeks    Discharge Procedure Orders (must include Diet, Follow-up, Activity):    Discharge Procedure Orders (must include Diet, Follow-up, Activity)  Call MD for:  temperature >100.4     Call MD for:  persistent nausea and vomiting or diarrhea     Call MD for:  severe uncontrolled pain     Call MD for:  redness, tenderness, or signs of infection (pain, swelling, redness, odor or green/yellow discharge around incision site)     Call MD for:  difficulty breathing or increased cough     Call MD for:  severe persistent headache

## 2018-03-12 NOTE — PLAN OF CARE
Patient sitting in chair and states she is ready to go home. Patient denies pain, nausea or any weakness. Spouse chairside and states he is ready to take patient home; he states he is driving the patient home. Ice pack given for as needed use. Both patient and spouse are able to teachback usage and precaution instructions.

## 2018-03-12 NOTE — H&P (VIEW-ONLY)
Referring Physician: No ref. provider found    PCP: Jan Olivo MD    CC:  R shoulder and neck pain    Interval History:  Mrs. Dela Cruz is a 72 y.o. female with neck pain who presents today for f/u s/p cervical CHRISTOPHER. Reports 80% relief of neck and shoulder pain. Pain improved after 2 weeks. She denies any negative SEs. Cervical TPI caused increased pain. She c/o chronic low back pain today. Denies radiation down LE. Pain is worse in the AM or after prolonged sitting. She continues to take Gabapentin. Denies LE weakness or b/b changes. Pain today is rated 2/10.  Pt has been seen in the clinic before, however pt is new to me.     History below per Dr. Clancy    HPI:   Ayla Dela Cruz is a 72 y.o. female who presents as a new patient from Dr. Grubbs for 2.5 month history of R shoulder/mid back pain. The pain first began when she lifted some books at a bookstore in early November. She began feeling the pain in her R lateral neck/suprascapular region, with radiation down the back/side of her arm to the top of the elbow. She was treated with some topical creams, chiropractor adjustments, and then was placed on gabapentin by Dr. Grubbs, which has helped her pain a considerable amount. She states that the pain is intermittent, aching, sharp, shooting & radiates to her elbow. Worsened by sitting, or lying in bed. 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     ROS:  CONSTITUTIONAL: No fevers, chills, night sweats, wt. loss, appetite changes  SKIN: no rashes or itching  ENT: No headaches, head trauma, vision changes, or eye pain  LYMPH NODES: None noticed   CV: No chest pain, palpitations.   RESP: No shortness of breath, dyspnea on exertion, cough, wheezing, or hemoptysis  GI: No nausea, emesis, diarrhea, constipation, melena, hematochezia, pain.    : No dysuria, hematuria, urgency, or frequency   HEME: No easy bruising, bleeding problems  PSYCHIATRIC: No anxiety, psychosis, hallucinations. +ve depression  NEURO: No  seizures, memory loss, dizziness, +ve difficulty sleeping  MSK: +HPI      Past Medical History:   Diagnosis Date    Anticoagulant long-term use     Anxiety     Arthritis     Atrial fibrillation     Cancer     skin    CHF (congestive heart failure)     Depression     DVT (deep venous thrombosis)     GERD (gastroesophageal reflux disease)     Glaucoma (increased eye pressure)     Hyperlipidemia     diet controlled    Hypertension     Interstitial lung disease     Pneumonia 1/31/2014    Stroke 6-3-14    Stroke     TIA (transient ischemic attack)     TIA (transient ischemic attack)      Past Surgical History:   Procedure Laterality Date    2 heart ablations      3 in total    bilateral cataracts      CHOLECYSTECTOMY      COLONOSCOPY N/A 1/19/2017    Procedure: COLONOSCOPY and EGD;  Surgeon: Jonathan Schultz MD;  Location: [facility] ENDO;  Service: Endoscopy;  Laterality: N/A;    pyloristenosis      skin cancer removal       TONSILLECTOMY       Family History   Problem Relation Age of Onset    Heart disease Father     Ulcers Father     Arthritis Mother     Asthma Mother     Rheum arthritis Mother     Pneumonia Mother     Depression Son     Alzheimer's disease Maternal Uncle     Rheum arthritis Maternal Grandmother     Emphysema Maternal Grandfather     Cancer Maternal Grandfather      kidney    Kidney disease Maternal Grandfather     Cancer Paternal Grandmother      lung= smoker    Pneumonia Paternal Grandfather     Breast cancer Neg Hx     Ovarian cancer Neg Hx      Social History     Social History    Marital status:      Spouse name: N/A    Number of children: N/A    Years of education: N/A     Social History Main Topics    Smoking status: Never Smoker    Smokeless tobacco: Never Used    Alcohol use No    Drug use: No    Sexual activity: Yes     Partners: Male     Other Topics Concern    None     Social History Narrative    None         Medications/Allergies: See med  "card    Vitals:    02/19/18 1012   BP: (!) 155/71   Pulse: (!) 58   Weight: 61.7 kg (136 lb)   Height: 5' 7" (1.702 m)   PainSc:   2   PainLoc: Neck         Physical exam:    GENERAL: A and O x3, the patient appears well groomed and is in no acute distress.  Skin: No rashes or obvious lesions  HEENT: normocephalic, atraumatic  CARDIOVASCULAR:  bradycardia  LUNGS: non labored breathing  ABDOMEN: soft, nontender   UPPER EXTREMITIES: Normal alignment, normal range of motion, no atrophy, no skin changes,  hair growth and nail growth normal and equal bilaterally. No swelling, no tenderness.    LOWER EXTREMITIES:  Normal alignment, normal range of motion, no atrophy, no skin changes,  hair growth and nail growth normal and equal bilaterally. No swelling, no tenderness.  CERVICAL SPINE:  Cervical spine: ROM is full in flexion, extension and lateral rotation without increased pain.  Spurling's maneuver causes no neck pain to either side.  Myofascial exam: tender to palpation along suprascapular muscle and anterior pressure of shoulder.  MENTAL STATUS: normal orientation, speech, language, and fund of knowledge for social situation.  Emotional state appropriate.  LUMBAR spine: muscle atrophy noted in left paraspinal muscles.   ROM full in flexion, extension, and lateral rotation   + facet loading on right.     CRANIAL NERVES:  II:  PERRL bilaterally,   III,IV,VI: EOMI.    V:  Facial sensation equal bilaterally  VII:  Facial motor function normal.  VIII:  Hearing equal to finger rub bilaterally  IX/X: Gag normal, palate symmetric  XI:  Shoulder shrug equal, head turn equal  XII:  Tongue midline without fasciculations      MOTOR: Tone and bulk: normal bilateral upper and lower Strength: normal   Delt Bi Tri WE WF     R 5 5 5 5 5 5   L 5 5 5 5 5 5     IP ADD ABD Quad TA Gas HAM  R 5 5 5 5 5 5 5  L 5 5 5 5 5 5 5    SENSATION: Light touch and pinprick intact bilaterally  REFLEXES: normal, symmetric, nonbrisk.  Toes down, no " clonus. No hoffmans.  GAIT: normal rise, base, steps, and arm swing.        Imaging:  MRI C-spine 12/2017  There is a 2 mm retrolisthesis of C4 on C5 and C6 on C7.  There is reversal of the normal cervical lordosis which could relate to neck muscle spasm.The cervical vertebral bodies show normal signal intensity and height with no indication of acute fracture or pathologic marrow replacement process.    There is degenerative disc desiccation at every cervical level with marginal osteophyte formation and disc space narrowing noted at C3-C4, C4-C5, C5-C6, and to a lesser extent, C6-C7.  There is congenital spinal canal narrowing present.    The cervical cord is normal in signal intensity at all levels with no indication of myelomalacia or cord edema. The incidentally observed soft tissues of the neck show no significant abnormalities.    C2-C3: There is bilateral hypertrophic facet arthropathy.  There is left ligamentum flavum thickening.  No definite acquired central canal or neuroforaminal stenosis.    C3-C4: There is a posterior disc osteophyte complex with a superimposed broad central disc protrusion which flattens the ventral cord.  There is ligamentum flavum thickening, bilateral uncovertebral spurring, and bilateral facet arthropathy, left greater than right.  There is moderate overall central canal stenosis, moderate-severe left neuroforaminal stenosis, and moderate right neuroforaminal stenosis.    C4-C5: There is a bulging posterior disc osteophyte complex, ligamentum flavum thickening, bilateral uncovertebral spurring, and facet arthropathy, right greater than left.  There is severe bilateral neural foraminal stenosis and moderate overall central canal stenosis.  No abnormal cord signal.  There is flattening of the ventral cord.    C5-C6: There is a posterior disc osteophyte complex and bilateral uncovertebral spurring, right greater than left.  No left neuroforaminal stenosis.  There is mild-moderate right  neuroforaminal stenosis and mild-moderate overall central canal stenosis.    C6-C7: There is a 2 mm retrolisthesis of C6 on C7 with uncovering of the intervertebral disc and a superimposed posterior disc osteophyte complex.  There is bilateral uncovertebral spurring, left greater than right.  There is severe left and moderate right neuroforaminal stenosis.  There is ligamentum flavum thickening.  There is moderate central canal stenosis with flattening of the ventral cord.    C7-T1: No central canal or neuroforaminal stenosis. No disc protrusion or extrusion.      Assessment:  Mrs. Dela Cruz is a 72 y.o. female with   1. Lumbar radiculopathy    2. Cervical radiculopathy    3. DDD (degenerative disc disease), cervical    4. Chronic midline low back pain without sciatica          Plan:  1.  I have stressed the importance of physical activity and exercise to improve overall health  2. Monitor progress and consider repeat cervical epidural steroid injection.   3. Lumbar xray  4. Schedule lumbar MBB at L3, L4, L5. If positive workup, will schedule RFA

## 2018-03-21 VITALS
WEIGHT: 136 LBS | RESPIRATION RATE: 18 BRPM | SYSTOLIC BLOOD PRESSURE: 159 MMHG | BODY MASS INDEX: 21.35 KG/M2 | HEART RATE: 66 BPM | DIASTOLIC BLOOD PRESSURE: 79 MMHG | OXYGEN SATURATION: 97 % | HEIGHT: 67 IN | TEMPERATURE: 98 F

## 2018-03-26 ENCOUNTER — OFFICE VISIT (OUTPATIENT)
Dept: CARDIOLOGY | Facility: CLINIC | Age: 73
End: 2018-03-26
Payer: MEDICARE

## 2018-03-26 VITALS
DIASTOLIC BLOOD PRESSURE: 74 MMHG | RESPIRATION RATE: 16 BRPM | BODY MASS INDEX: 20.55 KG/M2 | HEIGHT: 67 IN | WEIGHT: 130.94 LBS | HEART RATE: 64 BPM | OXYGEN SATURATION: 99 % | SYSTOLIC BLOOD PRESSURE: 138 MMHG

## 2018-03-26 DIAGNOSIS — Z86.79 S/P ABLATION OF ATRIAL FIBRILLATION: ICD-10-CM

## 2018-03-26 DIAGNOSIS — R00.2 PALPITATIONS: ICD-10-CM

## 2018-03-26 DIAGNOSIS — R06.09 DOE (DYSPNEA ON EXERTION): ICD-10-CM

## 2018-03-26 DIAGNOSIS — Z98.890 S/P ABLATION OF ATRIAL FIBRILLATION: ICD-10-CM

## 2018-03-26 DIAGNOSIS — I49.2 PREMATURE JUNCTIONAL CONTRACTIONS: ICD-10-CM

## 2018-03-26 DIAGNOSIS — Z79.899 LONG TERM CURRENT USE OF AMIODARONE: ICD-10-CM

## 2018-03-26 DIAGNOSIS — R00.2 HEART PALPITATIONS: Primary | ICD-10-CM

## 2018-03-26 PROCEDURE — 99213 OFFICE O/P EST LOW 20 MIN: CPT | Mod: PBBFAC,PO,25 | Performed by: INTERNAL MEDICINE

## 2018-03-26 PROCEDURE — 93005 ELECTROCARDIOGRAM TRACING: CPT | Mod: PBBFAC,PO | Performed by: INTERNAL MEDICINE

## 2018-03-26 PROCEDURE — 93010 ELECTROCARDIOGRAM REPORT: CPT | Mod: S$PBB,,, | Performed by: INTERNAL MEDICINE

## 2018-03-26 PROCEDURE — 99214 OFFICE O/P EST MOD 30 MIN: CPT | Mod: S$PBB,,, | Performed by: INTERNAL MEDICINE

## 2018-03-26 PROCEDURE — 99999 PR PBB SHADOW E&M-EST. PATIENT-LVL III: CPT | Mod: PBBFAC,,, | Performed by: INTERNAL MEDICINE

## 2018-03-26 RX ORDER — AMIODARONE HYDROCHLORIDE 100 MG/1
200 TABLET ORAL DAILY
Qty: 60 TABLET | Refills: 11 | Status: SHIPPED | OUTPATIENT
Start: 2018-03-26 | End: 2018-09-05

## 2018-03-26 RX ORDER — LORAZEPAM 0.5 MG/1
0.5 TABLET ORAL DAILY PRN
Refills: 1 | COMMUNITY
Start: 2018-02-22 | End: 2018-10-16 | Stop reason: DRUGHIGH

## 2018-03-26 RX ORDER — HALOBETASOL PROPIONATE 0.5 MG/G
CREAM TOPICAL
Status: ON HOLD | COMMUNITY
Start: 2018-02-21 | End: 2018-04-10

## 2018-03-26 RX ORDER — FUROSEMIDE 20 MG/1
20 TABLET ORAL DAILY
COMMUNITY
End: 2018-10-21

## 2018-03-26 RX ORDER — BRINZOLAMIDE 10 MG/ML
SUSPENSION/ DROPS OPHTHALMIC
Status: ON HOLD | COMMUNITY
Start: 2018-03-06 | End: 2018-04-10

## 2018-03-26 RX ORDER — TIMOLOL MALEATE 5 MG/ML
SOLUTION/ DROPS OPHTHALMIC
Status: ON HOLD | COMMUNITY
Start: 2018-03-06 | End: 2018-07-03 | Stop reason: HOSPADM

## 2018-03-26 NOTE — PROGRESS NOTES
Subjective:    Patient ID:  Ayla Dela Cruz is a 73 y.o. female who presents for evaluation of No chief complaint on file.  For PAF on amiodarone 100 mg daily , AVILA, post AF - ablation, LVH, chronic diastolic HF   PCP: Dr. Olivo, see annually, do labs  Surgeon: Dr. Gonsalez, and Dr. Dc  Rheumatologist: Dr. Pak  Prior cardiologist: Dr. Gibson, last seen over a year  Pulmonary: Dr. White  Psychologist: Nu Fermin, AGUILAR, in [place]  Gyn: Dr. Jordan  GI: Dr. Schultz  Neurologist: Dr. Ramirez  Dermatologist: Dr. Zavaleta, [place]  Pain: Dr. Clancy, injections to neck and both hips very helpful  Lives with , Jan, here with patient, non-smoker, history of prostate CA,  50 years on 6/24  daughterLyric, source of stress  Restarted , now 2-4 times weekly, still enjoys it    HPI  Hospitalized 6/3/2014 for acute right sided weakness and slurred speech  DCS - Ayla Dela Cruz is a 69 y.o. female admitted to the services of hospital medicine via the ER for acute CVA. Presented with difficulty speaking, facial drooping and weakness of the right upper and lower extremities. She missed the time window for tPA. ST/PT/OT as well as cardiology and neurology were consulted. Dr. Jurado of cardiology managed A fib. Patient rapidly improved functioning with PT/OT/ST. Currently her goals with PT are met as she is ambulating over 400 feet independently.  She was not approved for IP rehab and refuses SNF. She will be discharged home with outpatient PT/ST/OT evaluation and treatment.    Neurocardio work up  CT head - Mild involutional changes and findings suggesting mild microvascular ischemic change with no acute intracranial findings    Carotid US - No hemodynamically significant stenosis is noted by velocity criteria involving either internal carotid artery.  A hemodynamically significant stenosis is defined as a stenosis of greater than 50% of the internal carotid artery vessel lumen    ECHO,  6/4/2014, CONCLUSIONS     1 - Concentric hypertrophy. Wall thickness is mildly increased, with the septum and the posterior wall each measuring 1.1 cm across.    2 - Normal left ventricular systolic function (EF 60-65%).     3 - Mild mitral regurgitation.     4 - Left ventricular diastolic dysfunction.     5 - Pulmonary hypertension. estimated PA systolic pressure is 64 mmHg.    6 - Normal right ventricular systolic function .     7 - Suggestion for increase in LV mass from echo on 3/18/2014..     Echo bubble study also negative. Had episode of PAF during monitoring and convert with restart of Flecainide.   Since DC, improving strength in the right hand, right leg feels normal but tire easier. Speech improving. To receive PT/OT/speech today.  Medications reviewed - will DC ASA for now, and know the effects of the Limbitrol and Ativan, uses prn rarely. Some loss of appetite and taste.    Active problems in hospital  Acute left hemispheric CVA, MRI suggest multiple areas, was not on OAC nor antiplatelet Rx  PAF with high CHADS-VAS score, post ablations and DCCs  HTN with LVH  Interstitial lung disease  Pulmonary hypertension  Hyperlipidemia was not on statin    Since visit of 6/20, problem with peripheral swelling, now taking Lasix daily. Last hospitalization / CMP, 6/3 showed K+ 3.4 with normal renal function. Also noted AVILA along with exertional chest heaviness, on-and-off over past several years. Noted it with walking and have to rest. Can last up to an hour. Max grade 10/10, yesterday during the day.  in hospital. Also quite anxious, stopped Limbitrol due to side effects, managed by Dr. Olivo. Quite sedentary and major decrease in appetite, due to depression. No Psychiatrist. Home BP high 175/97. ECG shows NSR, rate 61, right axis, nonspecific T waves abnormalities, prolong QTc 483 msec. Low anterior forces.    Holter, 6/30/2014:  PREDOMINANT RHYTHM  1. Sinus rhythm with heart rates varying between 43 and  "67 bpm with an average of 55 bpm.     VENTRICULAR ARRHYTHMIAS  1. There were frequent polymorphic PVCs totalling 1388 and averaging 40 per hour.  There was 1 couplet.    2. There were no episodes of ventricular tachycardia.    SUPRA VENTRICULAR ARRHYTHMIAS  1. There were very rare PACs totalling 47 and averaging 1 per hour.     2. There were no episodes of sustained supraventricular tachycardia.    SINUS NODE FUNCTION  1. There was no evidence of high grade SA khai block.     AV CONDUCTION  1. There was no evidence of high grade AV block.     DIARY  1. The diary was returned, but not completed    MISCELLANEOUS  1. This was a tape of adequate length (34 hrs).     Since visit of 7/24, better, reviewed by Dr. Olivo and started antidepressant Lexapro. BMP still showed mild hypokalemia of 3.3, not using Lasix at this time. Still feeling fatigue, anxiety, and request refill for Nexium. Discussed need for GI review on chronic PPI. Lack of appetite.  Lexiscan, 7/30/2014, - Nuclear Quantitative Functional Analysis:   LVEF: 66 % (normal is 55 - 69)  LVED Volume: 50 ml (normal is 60 - 98)  LVES Volume: 17 ml (normal is 20 - 42)    Impression: NORMAL MYOCARDIAL PERFUSION  1. The perfusion scan is free of evidence for myocardial ischemia or injury.   2. There is a mild intensity fixed defect in the anteroseptal wall of the left ventricle, secondary to breast attenuation.   3. Resting wall motion is physiologic.   4. Resting LV function is normal.  (normal is 55 - 69)  5. The ventricular volumes are normal at rest and stress.   6. The extracardiac distribution of radioactivity is normal.     No problem with spironolactone, BP improved, home BP similar to office readings.    Since visit of 8/14, had some distress and home monitor showed HR of 40, felt "bad" and nervous to ED, note reviewed, ECG and final pulse count 57-58. Labs reviewed - normal renal function and K+ 3.8. Still taking one tab of K+ daily. Not using Lasix. " "Explained HR discrepancy may be due to VPCs. Reviewed by Ms. Fermin and Lexapro increased to 20 mg, 2 days ago. Feeling "a lot better". Sleeping better. Still sedentary but ready to be more active. Eating better have lost additional 5 lbs since 8/2014.    Since visit of 9/5, still with speech therapy, noted progressive near syncope with SOB and irregular heart beats. Also noted some ankle swelling over the past 2 weeks. ECG shows marked bradycardia, rate 48, right axis, pulmonary pattern, 1st degree AVB. Wellbutrin 100 mg daily along with Lexapro 20 mg by Ms. Markellroni NP. BMP showed K+3.5. To use Lasix PRN    Since visit of 9/25, still with marked AVILA, only able to walk about 30' before having to stop. Bothered by increase palpitations during tapering of BB. No definite lung problem, evaluated this year. ECG shows sinus dewayne, rate 53, VPC, RBBB with suggestion of septal MI. No evidence for anemia - CBC 9/3/2014 was normal. Just started doing some activities with arm and leg exercise for the last 2 weeks, Doing some OT alternating with PT every other day.  CXR, 6/3/2014 - Lungs are clear of infiltrate and costophrenic sulci are sharp.  The cardiomediastinal silhouette appears stable.    PET scan, 4/2014 - 1.A hypermetabolic left pulmonary mass is noted with an SUV of 3.0 maximally.  A neoplastic, infectious, or granulomatous process is on the differential. Clinical correlation is suggested.  Close follow-up for resolution or biopsy would be suggested    2.  Elevated right-sided heart pressures are suspected with a large right atrium    3.  Right-sided pleural effusion    4.  Hypermetabolic thyroid gland asymmetrically on the right.  This may relate to thyroiditis, Graves' disease, or neoplastic process.  Ultrasound may be helpful.    5.  Small hypermetabolic focus in the expected location of the left ovary, a female pelvis ultrasound may be helpful for further evaluation.  This could relate to a uterine fibroid that " "has necrosed or infarcted    Biopsy of lung nodule on 5/1/2014 - negative for CA    CTA, 4/2014 - No evidence of pulmonary thromboembolism.    2.  Enlarged cardiac silhouette and secondary findings of elevated right heart pressures with diffuse mosaic lung pattern and right basilar pleural fluid suggesting cardiac decompensation/heart failure.    3. Unchanged 1 cm nodular density within the left upper lobe which previously demonstrated hypermetabolism by PET/CT and unchanged mediastinal lymphadenopathy.    4.  Subpleural nodular density within the right upper lobe is unchanged when compared with the previous study and could represent an area of remote fibrotic scarring.    5.  Heterogeneous appearance of the spleen, possibly due to the phase of contrast enhancement.  Evolving splenic infarction is not entirely excluded.    6. Suggestion of colonic wall thickening involving the descending colon.  Please correlate for symptoms of colitis.    Since visit of 10/14, rehospitalized for HF on 10/26 to 10/29/2014.  DCS - "Ayla Dela Cruz is a 69 y.o. female admitted to the services of hospital medicine via the ER for acute diastolic heart failure. C/o severe SOB and increase in leg swelling over the past two days. Under the care of Dr. Jurado. Recently was taken off metoprolol. History of paroxsymal atrial fib. Hospitalized here in June in this year for acute CVA. It was at that time, pt started Xarelto. Deficits has resolved. No history of heart failure.     Hospital Course: The patient was admitted with acute CHF biventricular and mild elevation of her troponin's at 0.029 - 0.035 and therefore an anginal equivalent was suspected. The patient also had significant hypertension upon admission and although she was not in atrial fibrillation, her EKG showed a significant first degree AV block and RBBB. Discussion was made as to whether or not to decrease the patient flecainide; however due to the risk of her going back into " "atrial fibrillation this was not done. Upon discharge the patient's lisinopril was increased to 10 mg and aldactone was added."    RHC done HEMODYNAMIC RESULTS:    LVEDP (Pre): 24 mmHg  BP: 166/104    Air rest:  PA: 90/34 (54)  PW:  (24)  RVEDP: 16  RA:  (16)    C. SUMMARY:    1. Diastolic dysfunction.  2. - Kee CO 2.64 L/min  3. - Mean systemic pressure 108.6 mmHG, stage II HTN  4. - SVR 41.16 Wood Units  5. - PVR 11.36 Wood Units  6. - Pulmonary HTN due to left sided heart disease and stage II HTN    D. RECOMMENDATIONS:    1. Routine post-cath care.  2. Cardiac rehab referral.  3. Follow up with Dr. Barrett Jurado.  4. - Aggressive BP lowering and diuresis    Repeat ECHO 11/5/2014 CONCLUSIONS     1 - Concentric remodeling. Septal wall thickness is increased, and posterior wall thickness is mildly increased, with the septum measuring 1.3 cm and the posterior wall measuring 1.1 cm across.    2 - Mildly to moderately depressed left ventricular systolic function (EF 40-45%).     3 - Left ventricular diastolic dysfunction. E/e'(lat) is 16    4 - Right ventricular enlargement with mildly depressed systolic function.     5 - Pulmonary hypertension. estimated PA systolic pressure is 56 mmHg.     6 - Intermediate central venous pressure.     7 - No evidence of intracardiac shunt.     8 - No significant change from Echo of 6/4/2014.    Active problems:  Progressive severe SOB, functional class IV  Diastolic dysfunction, elevated BNP and Troponin  Hypokalemia due to diuretics  Secondary Pulmonary HTN with peripheral edema  HTN  History of CVA 6/3/2014  PAF controlled with Flecanide  Marked bradycardia with BB  On OAC  Hematuria    At home receiving PT, OT and speech Rx twice a week, with  nurse once a week, no problem with medications. Feeling better, sleeping well. Home /73.    Since visit of 11/14, feeling "much better", concern of occasional HTN, home BP /66-99, HR . Lot more active, no problem. Not using " "walker, no CP, SOB, nor dizziness. Recent BMP - normal renal function and K+ 4.1.    Since visit of 12/19/2014, worry about HTN home readings similar to office up to . Morning reading is higher. No HA nor visual abnormalities. Xanax has helped but afraid to use too much. ECG shows sinus arrhythmia, rate of 65, late transition and LAFB. Also bothered with dry cough with the Lisinopril. And started to have return of arm pains that was attributed to atorvastatin, on Crestor. Discussed options.     Since visit of 1/16/2015, noted frequent nocturia, 8-10 times, taking losartan-HCT at night time. Have stopped using Lasix and spironolactone for past 2 months. More active without much problem. Labs normal renal function, K+ 4.2, BNP now 37, A1C 5.6%.    Since visit of 2/18/2015, improving, no further dizziness, some SOB when "stressed", usually helped with alprazolam, use only prn, about 3-6 times weekly. Compliant with medications. Been off Crestor for 6 weeks with concern of muscle problem. Home BP is fine, similar to office.     Since visit of 4/15, appetite returned, feeling a lot better. Active, walking twice a week for about half an hour. Also do calisthenic about twice a week, no problem. Seeing Dr. Zavaleta for generalized pruritis for past 3 months. Cardiac wise less AVILA. Sleeping well. Labs in 5/2015, normal CBC without eosinophilia, thyroid normal, ferritin level low normal at 21. Off Crestor for couple months due to fear of muscle pain but did not find much difference being off medication. Last Lipid in 11/2014 was good, on daily dose of Crestor. Home /79.    Since visit of 7/2/2015, feeling "great", very active without problem, no more AVILA nor dizziness with standing. Compliant with medications, don't miss. Using Tylenol 500 mg BID for arthritis. Notice easy bruising on Xarelto. Home /10.  Holter - PREDOMINANT RHYTHM  1. Sinus arrhythmia with heart rates varying between 46 and 110 bpm with an " average of 73 bpm.     VENTRICULAR ARRHYTHMIAS  1. There were very rare PVCs recorded totalling 8 and averaging less than 1 per hour.     2. There were no episodes of ventricular tachycardia.    SUPRA VENTRICULAR ARRHYTHMIAS  1. There were very frequent PACs totalling 3054 and averaging 132 per hour.  There were 29 bigeminal cycles.  There were 103 couplets.    2. 3% of complexes.    3. There were no episodes of sustained supraventricular tachycardia.    SINUS NODE FUNCTION  1. There was no evidence of high grade SA khai block.     AV CONDUCTION  1. There was no evidence of high grade AV block.     2. The longest RR interval was 1565 msec.     DIARY  1. The diary was properly completed.     2. There was 1 episode of skipping beats reported. The corresponding rhythm strips revealed the following:             During event 1 the rhythm was sinus rhythm at 75 bpm.     3. There was 1 episode of skipped beat reported. The corresponding rhythm strips revealed the following:             During event 1 the rhythm was sinus arrhythmia at 63 bpm.     MISCELLANEOUS  1. This was a tape of adequate length (23 hrs).     Since visit of 10/15, place on new antidepressant, Venlafaxine 75 mg daily, tried 2 and developed rapid HR to 125 bpm, feels more anxious, now switched to Citalopram. Also noted the daily dose of Lorazepam does not seem adequate. Orthostatic VS is positive with lying 142/73, , standing 120/62, . Been taking spironolactone 25 mg for last 3 days. Does feel thirsty. Labs reviewed A1C 5.8%, CBC, BMP were WNL. Lipid shows LDL 99, non-. Goal preferred is <70 and < 100. No problem with 10 mg of Crestor. Had vacation at [employer] [place], walked all over without problem.    Since visit of 1/18/2016, no new problem. 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 Labs with , non-, hsCRP at 2.56.     In 10/2016, had acute GB attack with rupture in 8/2016, then tried on  "Wellbutrin took only 1-2 tabs and BP up to 201/100 with head tightness. Subsequently back to normal, the last 2.5 days took Losartan bid. Discussed Rx options for HTN. Advised to be more active, regular exercise. Lab reviewed LDL in 8/2016 93.     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 Still with daily palpitations, last up to half hours. Have electronic watch, David Barroso, since 9/2016, suppose harmonize patient with the universe. Feeling better if on during the day. Lot of walking at school, no problem.   Holter in 10/2016 - PREDOMINANT RHYTHM  1. Sinus rhythm with heart rates varying between 52 and 144 bpm with an average of 75 bpm.     2. Paroxysmal atrial fibrillation    VENTRICULAR ARRHYTHMIAS  1. There were occasional mostly monomorphic PVCs totalling 596 and averaging 13 per hour.     2. There were no episodes of ventricular tachycardia.    SUPRA VENTRICULAR ARRHYTHMIAS  1. There were frequent PACs totalling 2982 and averaging 69 per hour.  There were 69 bigeminal cycles.  There were 55 couplets.    2. There were no episodes of sustained supraventricular tachycardia.    3. Paroxysmal atrial fibrillation was noted in the the study.     SINUS NODE FUNCTION  1. There was no evidence of high grade SA khai block.     AV CONDUCTION  1. There was no evidence of high grade AV khai filtering.     2. The longest RR interval was 1725 msec.     DIARY  1. The diary was returned, but not completed    MISCELLANEOUS  1. This was a tape of adequate length (43 hrs).     ECHO CONCLUSIONS     1 - Hyperdynamic left ventricular systolic function (EF 65-70%).     2 - Normal left ventricular diastolic function.     3 - Normal right ventricular systolic function .     4 - Pulmonary hypertension. The estimated PA systolic pressure is 64 mmHg.     5 - Less evidence for LVH from Echo in 11/2014.     In 5/2017, bothered by recurrent PAF with AVILA after 10 feet walking. Felt better before on Flecainide 100 mg bid, but " "Holter continued to show PAF. Attempt up titration, problem with itching, switched to propafenone 150 mg tid, continued with itching and reviewed by allergist, treated with 10 days of cortisone with benefit. No hive and no itching, just don't feel good due to the irregular rhythm. History reviewed, had 2 prior AF ablation, last in [place], [place] in around 2000, recall being told not to do again. Recall seeing an [facility] EP doc but didn't like him. Discussed various options. Will stop antiarrhythmic for now, will be going on a 16 days trip to [place]. Reviewed prior medications, have taken Tenormin, metoprolol tartrate, and short-acting diltiazem in the past without problem. Refer to Dr. Calderon for review. ECG in AF, rate 99, PRWP, LAFB    In 11/2017, post AF ablation, felt good for a few weeks, noted some SOB and had review with Dr. Calderon on 11/1 - 72 year old female with a longstanding history of atrial arrhythmias. Her [id number] Score is 5 (age, female, TIA, HTN) so she should remain on anticoagulation indefinitely. She has failed Flecainide. She is now 6 weeks post-PVI and MA flutter line, and maintaining sinus rhythm on low-dose Amiodarone. Given her intermittent AVILA which started post-ablation, I have stopped Amiodarone which I think is the likely culprit. I instructed her to continue taking Lasix PRN, as well as metoprolol and Xarelto." ECG on 11/1 was in NSR, rate 61, PRWP.  Recommended to stop amiodarone but then started to feel the palpitation daily, felt nervous and at the same time being taper down on the nightly Ativan. Did have some breakthrough anxiety attacks. Also had strained the upper back and right arm / shoulder, requesting some Tramadol, tried Jan's Tramadol with good relief. Understand this is an opoid. Used Excedrin with caffeine and bothered by more palpitations.    In 3/2018, here for recurrent palpitations, no inciting factors over the past 6 weeks. Had review with Dr. Calderon in 2/2018 " "- "72 year old female with a longstanding history of atrial arrhythmias and prior ablations at outside institutions. Her [id number] Score is 5 (age, female, TIA, HTN) so she should remain on anticoagulation indefinitely. She is now 5 months post-PVI and MA flutter line, and maintaining sinus rhythm off Amiodarone. The plan is to continue Xarelto, and to see me again in 6 months." Palpitations appears associated with AVILA, had difficulties walking in house or up a ramp. Started 100 mg of amiodarone last week, daily, feels some benefit. ECG today in Sinus rhythm vs wandering atrial pacemaker with frequent PJC, rate 59. Also taking Losartan in the morning appears more effective.  Holter 11/2017 - PREDOMINANT RHYTHM  1. Sinus arrhythmia with heart rates varying between 39 and 102 bpm with an average of 58 bpm.     VENTRICULAR ARRHYTHMIAS  1. There were occasional PVCs totalling 728 and averaging 15 per hour.  There were 5 bigeminal cycles.     2. There were no episodes of ventricular tachycardia.    SUPRA VENTRICULAR ARRHYTHMIAS  1. There was no supraventricular ectopic activity.    SINUS NODE FUNCTION  1. There was no evidence of high grade SA khai block.     AV CONDUCTION  1. There was no evidence of high grade AV block.     2. The longest RR interval was 1820 msec.     DIARY  1. The diary was not returned    MISCELLANEOUS  1. There were occasional hookup related artifacts. Overall, the study was of good quality.     2. This was a tape of adequate length (48 hrs).     ROS    Constitutional: negative for chills, fatigue, fevers, sweats, no further slurred speech, and no dysarthria, appetite improved, intolerance to heat, bruises easily on NOAC  Eyes: negative for icterus, redness and visual disturbance, have visual halo, had a bleed in the right Iris  Respiratory: negative for asthma, cough have resolved off ACE-I, have dyspnea on exertion, occasional SOB with HR 85 to 100 bpm, have lifelong snoring, Brogan score 7 " "improved down to 3, no hemoptysis, pleurisy/chest pain and wheezing  Cardiovascular: negative for chest pain, chest pressure/discomfort, no further orthostatic dizziness, still with palpitations, no paroxysmal nocturnal dyspnea and syncope  Gastrointestinal: negative for abdominal pain, nausea and vomiting, have GERD  Musculoskeletal:positive for arthralgias, and less right sided muscle weakness with improvement in leg strength, improved bilateral ankle pains - OA and no myalgias  Neurological: negative for coordination problems, dizziness, gait problems, headaches, paresthesia, have some tremors but able to draw and no vertigo  Psych: positive for depression, nervousness, anxiety, less stressed due to 's illness have improved    Objective:    Physical Exam    General appearance: alert, appears stated age, cooperative and no distress, decrease skin turgor.  Head: Normocephalic, without obvious abnormality, atraumatic  Eyes:  conjunctivae/corneas clear. PERRL, EOM's intact.    Neck: no adenopathy, no carotid bruit, no JVD, supple, symmetrical, trachea midline and thyroid not enlarged, symmetric, no tenderness/mass/nodules  Lungs:  clear to auscultation bilaterally  Chest wall: no tenderness  Heart: regular rate and rhythm, normal S1, S2 normal, occasional grade 2/6 systolic murmur, click, rub or gallop    Abdomen: soft, non-tender; bowel sounds normal; no masses,  no organomegaly, waist 31" hip 42"  Extremities: extremities no further weakness of the right upper extremity, atraumatic, no cyanosis and have no edema   Pulses: 2+ and symmetric    Assessment:       1. Heart palpitations    2. Premature junctional contractions    3. Long term current use of amiodarone    4. BMI 24.8 decreased to 20,5    5. AVILA (dyspnea on exertion)    6. S/P ablation of atrial fibrillation         Plan:       Ayla was seen today for follow-up.    Diagnoses and associated orders for this visit:    Heart palpitations  -     " amiodarone (PACERONE) 100 MG Tab; Take 2 tablets (200 mg total) by mouth once daily.  Dispense: 60 tablet; Refill: 11    Premature junctional contractions  -     amiodarone (PACERONE) 100 MG Tab; Take 2 tablets (200 mg total) by mouth once daily.  Dispense: 60 tablet; Refill: 11    Long term current use of amiodarone  -     amiodarone (PACERONE) 100 MG Tab; Take 2 tablets (200 mg total) by mouth once daily.  Dispense: 60 tablet; Refill: 11    BMI 24.8 decreased to 20,5    AVILA (dyspnea on exertion)    S/P ablation of atrial fibrillation  -     amiodarone (PACERONE) 100 MG Tab; Take 2 tablets (200 mg total) by mouth once daily.  Dispense: 60 tablet; Refill: 11    - CV status stable, back on antiarrhythmic, all medications reviewed, patient acknowledge good understanding.  - Recommend using Tylenol as first line pain medications.  - Recommend at least biannual cardiovascular evaluation in view of her significant risk factors.  - Prefer to kept 5/25/2018 appointment.    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 A  would be very helpful.    Chronic pruritus, onset 3/2015 - resolved off Xanax     Depression    Stress at home - improved  - Consider Yoga, Gilberto Chi, or meditation    Elevated brain natriuretic peptide (BNP) level, range 98 - 1045 - improved     Pulmonary hypertension, with right sided congestive heart failure, acute    Chest pain on exertion - resolved    Peripheral edema - imporved    Statin intolerance with Lipitor    Anxiety - imporved    Patient Active Problem List   Diagnosis    Paroxysmal atrial fibrillation, ablations X 2 1995, 1997, CHADS-VAS score 5, HAS-BLED score 5     Hypertension, 1985    Hyperlipidemia, baseline     GERD (gastroesophageal reflux disease)    Glaucoma (increased eye pressure)    Anxiety    Fibroid uterus     Thickened endometrium    Abnormal CT scan, chest    Interstitial lung disease    H/O: CVA (cerebrovascular accident), June 2014    LVH (left ventricular hypertrophy) due to hypertensive disease    Cardiomegaly    BMI 24.8 decreased to 20,5    Long term current use of amiodarone, started 3/19/2018    Depression    AVILA (dyspnea on exertion)    VPC (ventricular premature complex)    OA (osteoarthritis)    Chronic diastolic heart failure, 2014    Elevated brain natriuretic peptide (BNP) level, range 98 - 1045    Microalbuminuria    Hypoalbuminemia    TIA (transient ischemic attack), 11/3/2014    Statin intolerance    Chronic pruritus, onset 3/2015    Hypotension due to drugs    Sepsis    Acute delirium    Cancer screening    Reflux esophagitis    Colon cancer screening    Atrial flutter    S/P ablation of atrial fibrillation    S/P ablation of atrial flutter    JOSE (generalized anxiety disorder)    Benzodiazepine withdrawal with complication    Caffeine adverse reaction    Muscle strain    Cervical radiculitis    Other spondylosis, lumbar region    Premature junctional contractions    Heart palpitations     Total face-to-face time with the patient was 30 minutes and greater than 50% was spent in counseling and coordination of care. The above assessment and plan have been discussed at length. Labs and procedure over the last 6 months reviewed. Problem List updated. Asked to bring in all active medications / pills bottles with next visit.

## 2018-03-27 DIAGNOSIS — R00.2 PALPITATIONS: Primary | ICD-10-CM

## 2018-04-03 ENCOUNTER — OFFICE VISIT (OUTPATIENT)
Dept: PAIN MEDICINE | Facility: CLINIC | Age: 73
End: 2018-04-03
Payer: MEDICARE

## 2018-04-03 ENCOUNTER — TELEPHONE (OUTPATIENT)
Dept: PAIN MEDICINE | Facility: CLINIC | Age: 73
End: 2018-04-03

## 2018-04-03 VITALS
WEIGHT: 130 LBS | SYSTOLIC BLOOD PRESSURE: 140 MMHG | DIASTOLIC BLOOD PRESSURE: 78 MMHG | HEART RATE: 47 BPM | BODY MASS INDEX: 20.4 KG/M2 | HEIGHT: 67 IN

## 2018-04-03 DIAGNOSIS — M50.30 DDD (DEGENERATIVE DISC DISEASE), CERVICAL: ICD-10-CM

## 2018-04-03 DIAGNOSIS — M54.12 CERVICAL RADICULOPATHY: Primary | ICD-10-CM

## 2018-04-03 DIAGNOSIS — M48.8X6 OTHER SPECIFIED SPONDYLOPATHIES, LUMBAR REGION: ICD-10-CM

## 2018-04-03 DIAGNOSIS — M79.10 MYALGIA: ICD-10-CM

## 2018-04-03 PROCEDURE — 99214 OFFICE O/P EST MOD 30 MIN: CPT | Mod: PBBFAC,PN | Performed by: PHYSICIAN ASSISTANT

## 2018-04-03 PROCEDURE — 99214 OFFICE O/P EST MOD 30 MIN: CPT | Mod: S$PBB,,, | Performed by: PHYSICIAN ASSISTANT

## 2018-04-03 PROCEDURE — 99999 PR PBB SHADOW E&M-EST. PATIENT-LVL IV: CPT | Mod: PBBFAC,,, | Performed by: PHYSICIAN ASSISTANT

## 2018-04-03 NOTE — PROGRESS NOTES
Referring Physician: No ref. provider found    PCP: Jan Olivo MD    CC:  R shoulder and neck pain    Interval History:  Mrs. Dela Cruz is a 73 y.o. female with neck pain who presents today for f/u and evaluation. She is s/p lumbar MB RFA L3, 4, 5 that has provided moderate benefit. Right sided neck and shoulder pain has started to return to baseline. She is s/p cervical CHRISTOPHER in January that provided  80% relief of neck and shoulder pain. Pain improved after 2 weeks. Cervical TPI caused increased pain.  She continues to take Gabapentin. Denies LE weakness or b/b changes. Pain today is rated 4/10.    HPI:   Ayla Dela Cruz is a 73 y.o. female who presents as a new patient from Dr. Grubbs for 2.5 month history of R shoulder/mid back pain. The pain first began when she lifted some books at a bookstore in early November. She began feeling the pain in her R lateral neck/suprascapular region, with radiation down the back/side of her arm to the top of the elbow. She was treated with some topical creams, chiropractor adjustments, and then was placed on gabapentin by Dr. Grubbs, which has helped her pain a considerable amount. She states that the pain is intermittent, aching, sharp, shooting & radiates to her elbow. Worsened by sitting, or lying in bed. 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     ROS:  CONSTITUTIONAL: No fevers, chills, night sweats, wt. loss, appetite changes  SKIN: no rashes or itching  ENT: No headaches, head trauma, vision changes, or eye pain  LYMPH NODES: None noticed   CV: No chest pain, palpitations.   RESP: No shortness of breath, dyspnea on exertion, cough, wheezing, or hemoptysis  GI: No nausea, emesis, diarrhea, constipation, melena, hematochezia, pain.    : No dysuria, hematuria, urgency, or frequency   HEME: No easy bruising, bleeding problems  PSYCHIATRIC: No anxiety, psychosis, hallucinations. +ve depression  NEURO: No seizures, memory loss, dizziness, +ve difficulty  sleeping  MSK: +HPI      Past Medical History:   Diagnosis Date    Anticoagulant long-term use     Anxiety     Arthritis     Atrial fibrillation     Cancer     skin    CHF (congestive heart failure)     Depression     DVT (deep venous thrombosis)     GERD (gastroesophageal reflux disease)     Glaucoma (increased eye pressure)     Hyperlipidemia     diet controlled    Hypertension     Interstitial lung disease     Pneumonia 1/31/2014    Stroke 6-3-14    Stroke     TIA (transient ischemic attack)     TIA (transient ischemic attack)      Past Surgical History:   Procedure Laterality Date    2 heart ablations      3 in total    bilateral cataracts      CHOLECYSTECTOMY      COLONOSCOPY N/A 1/19/2017    Procedure: COLONOSCOPY and EGD;  Surgeon: Jonathan Schultz MD;  Location: [facility];  Service: Endoscopy;  Laterality: N/A;    pyloristenosis      skin cancer removal       TONSILLECTOMY       Family History   Problem Relation Age of Onset    Heart disease Father     Ulcers Father     Arthritis Mother     Asthma Mother     Rheum arthritis Mother     Pneumonia Mother     Depression Son     Alzheimer's disease Maternal Uncle     Rheum arthritis Maternal Grandmother     Emphysema Maternal Grandfather     Cancer Maternal Grandfather      kidney    Kidney disease Maternal Grandfather     Cancer Paternal Grandmother      lung= smoker    Pneumonia Paternal Grandfather     Breast cancer Neg Hx     Ovarian cancer Neg Hx      Social History     Social History    Marital status:      Spouse name: N/A    Number of children: N/A    Years of education: N/A     Social History Main Topics    Smoking status: Never Smoker    Smokeless tobacco: Never Used    Alcohol use No    Drug use: No    Sexual activity: Yes     Partners: Male     Other Topics Concern    None     Social History Narrative    None         Medications/Allergies: See med card    Vitals:    04/03/18 1101   BP: (!) 140/78  "  Pulse: (!) 47   Weight: 59 kg (130 lb)   Height: 5' 7" (1.702 m)   PainSc:   4   PainLoc: Back         Physical exam:    GENERAL: A and O x3, the patient appears well groomed and is in no acute distress.  Skin: No rashes or obvious lesions  HEENT: normocephalic, atraumatic  CARDIOVASCULAR:  bradycardia  LUNGS: non labored breathing  ABDOMEN: soft, nontender   UPPER EXTREMITIES: Normal alignment, normal range of motion, no atrophy, no skin changes,  hair growth and nail growth normal and equal bilaterally. No swelling, no tenderness.    LOWER EXTREMITIES:  Normal alignment, normal range of motion, no atrophy, no skin changes,  hair growth and nail growth normal and equal bilaterally. No swelling, no tenderness.  CERVICAL SPINE:  Cervical spine: ROM is full in flexion, extension and lateral rotation without increased pain.  Spurling's maneuver causes no neck pain to either side.  Myofascial exam: tender to palpation along suprascapular muscle and anterior pressure of shoulder.  MENTAL STATUS: normal orientation, speech, language, and fund of knowledge for social situation.  Emotional state appropriate.  LUMBAR spine: muscle atrophy noted in left paraspinal muscles.   ROM full in flexion, extension, and lateral rotation   + facet loading on right.     CRANIAL NERVES:  II:  PERRL bilaterally,   III,IV,VI: EOMI.    V:  Facial sensation equal bilaterally  VII:  Facial motor function normal.  VIII:  Hearing equal to finger rub bilaterally  IX/X: Gag normal, palate symmetric  XI:  Shoulder shrug equal, head turn equal  XII:  Tongue midline without fasciculations      MOTOR: Tone and bulk: normal bilateral upper and lower Strength: normal   Delt Bi Tri WE WF     R 5 5 5 5 5 5   L 5 5 5 5 5 5     IP ADD ABD Quad TA Gas HAM  R 5 5 5 5 5 5 5  L 5 5 5 5 5 5 5    SENSATION: Light touch and pinprick intact bilaterally  REFLEXES: normal, symmetric, nonbrisk.  Toes down, no clonus. No hoffmans.  GAIT: normal rise, base, steps, " and arm swing.        Imaging:  MRI C-spine 12/2017  There is a 2 mm retrolisthesis of C4 on C5 and C6 on C7.  There is reversal of the normal cervical lordosis which could relate to neck muscle spasm.The cervical vertebral bodies show normal signal intensity and height with no indication of acute fracture or pathologic marrow replacement process.    There is degenerative disc desiccation at every cervical level with marginal osteophyte formation and disc space narrowing noted at C3-C4, C4-C5, C5-C6, and to a lesser extent, C6-C7.  There is congenital spinal canal narrowing present.    The cervical cord is normal in signal intensity at all levels with no indication of myelomalacia or cord edema. The incidentally observed soft tissues of the neck show no significant abnormalities.    C2-C3: There is bilateral hypertrophic facet arthropathy.  There is left ligamentum flavum thickening.  No definite acquired central canal or neuroforaminal stenosis.    C3-C4: There is a posterior disc osteophyte complex with a superimposed broad central disc protrusion which flattens the ventral cord.  There is ligamentum flavum thickening, bilateral uncovertebral spurring, and bilateral facet arthropathy, left greater than right.  There is moderate overall central canal stenosis, moderate-severe left neuroforaminal stenosis, and moderate right neuroforaminal stenosis.    C4-C5: There is a bulging posterior disc osteophyte complex, ligamentum flavum thickening, bilateral uncovertebral spurring, and facet arthropathy, right greater than left.  There is severe bilateral neural foraminal stenosis and moderate overall central canal stenosis.  No abnormal cord signal.  There is flattening of the ventral cord.    C5-C6: There is a posterior disc osteophyte complex and bilateral uncovertebral spurring, right greater than left.  No left neuroforaminal stenosis.  There is mild-moderate right neuroforaminal stenosis and mild-moderate overall  central canal stenosis.    C6-C7: There is a 2 mm retrolisthesis of C6 on C7 with uncovering of the intervertebral disc and a superimposed posterior disc osteophyte complex.  There is bilateral uncovertebral spurring, left greater than right.  There is severe left and moderate right neuroforaminal stenosis.  There is ligamentum flavum thickening.  There is moderate central canal stenosis with flattening of the ventral cord.    C7-T1: No central canal or neuroforaminal stenosis. No disc protrusion or extrusion.    Assessment:  Mrs. Dela Cruz is a 72 y.o. female with   1. Cervical radiculopathy    2. DDD (degenerative disc disease), cervical    3. Myalgia    4. Other specified spondylopathies, lumbar region        Plan:  1.  I have stressed the importance of physical activity and exercise to improve overall health  2. Schedule repeat cervical epidural steroid injection at C7-T1. I have explained the risks, benefits, and alternatives of the procedure in detail. The patient voices understanding and all questions have been answered. The patient agrees to proceed as planned. Written Consent obtained. .   3.Monitor progress from lumbar MB RFA  4. F/u prn

## 2018-04-04 DIAGNOSIS — M54.12 CERVICAL RADICULOPATHY: Primary | ICD-10-CM

## 2018-04-10 ENCOUNTER — HOSPITAL ENCOUNTER (OUTPATIENT)
Facility: AMBULARY SURGERY CENTER | Age: 73
Discharge: HOME | End: 2018-04-10
Attending: ANESTHESIOLOGY | Admitting: ANESTHESIOLOGY
Payer: MEDICARE

## 2018-04-10 ENCOUNTER — SURGERY (OUTPATIENT)
Age: 73
End: 2018-04-10

## 2018-04-10 DIAGNOSIS — M54.12 CERVICAL RADICULITIS: Primary | ICD-10-CM

## 2018-04-10 PROCEDURE — 62321 NJX INTERLAMINAR CRV/THRC: CPT | Mod: ,,, | Performed by: ANESTHESIOLOGY

## 2018-04-10 PROCEDURE — 62321 NJX INTERLAMINAR CRV/THRC: CPT | Performed by: ANESTHESIOLOGY

## 2018-04-10 PROCEDURE — 99152 MOD SED SAME PHYS/QHP 5/>YRS: CPT | Mod: ,,, | Performed by: ANESTHESIOLOGY

## 2018-04-10 PROCEDURE — G8907 PT DOC NO EVENTS ON DISCHARG: HCPCS | Performed by: ANESTHESIOLOGY

## 2018-04-10 RX ORDER — LIDOCAINE HYDROCHLORIDE 10 MG/ML
INJECTION, SOLUTION EPIDURAL; INFILTRATION; INTRACAUDAL; PERINEURAL
Status: DISCONTINUED
Start: 2018-04-10 | End: 2018-04-10 | Stop reason: HOSPADM

## 2018-04-10 RX ORDER — MIDAZOLAM HYDROCHLORIDE 2 MG/2ML
INJECTION, SOLUTION INTRAMUSCULAR; INTRAVENOUS
Status: DISCONTINUED | OUTPATIENT
Start: 2018-04-10 | End: 2018-04-10 | Stop reason: HOSPADM

## 2018-04-10 RX ORDER — DEXAMETHASONE SODIUM PHOSPHATE 10 MG/ML
INJECTION INTRAMUSCULAR; INTRAVENOUS
Status: DISCONTINUED | OUTPATIENT
Start: 2018-04-10 | End: 2018-04-10 | Stop reason: HOSPADM

## 2018-04-10 RX ORDER — FENTANYL CITRATE 50 UG/ML
INJECTION, SOLUTION INTRAMUSCULAR; INTRAVENOUS
Status: DISCONTINUED
Start: 2018-04-10 | End: 2018-04-10 | Stop reason: HOSPADM

## 2018-04-10 RX ORDER — SODIUM CHLORIDE, SODIUM LACTATE, POTASSIUM CHLORIDE, CALCIUM CHLORIDE 600; 310; 30; 20 MG/100ML; MG/100ML; MG/100ML; MG/100ML
INJECTION, SOLUTION INTRAVENOUS CONTINUOUS
Status: DISCONTINUED | OUTPATIENT
Start: 2018-04-10 | End: 2018-04-10 | Stop reason: HOSPADM

## 2018-04-10 RX ORDER — FENTANYL CITRATE 50 UG/ML
INJECTION, SOLUTION INTRAMUSCULAR; INTRAVENOUS
Status: DISCONTINUED | OUTPATIENT
Start: 2018-04-10 | End: 2018-04-10 | Stop reason: HOSPADM

## 2018-04-10 RX ORDER — LIDOCAINE HYDROCHLORIDE 10 MG/ML
INJECTION, SOLUTION EPIDURAL; INFILTRATION; INTRACAUDAL; PERINEURAL
Status: DISCONTINUED | OUTPATIENT
Start: 2018-04-10 | End: 2018-04-10 | Stop reason: HOSPADM

## 2018-04-10 RX ORDER — SODIUM CHLORIDE 9 MG/ML
INJECTION, SOLUTION INTRAMUSCULAR; INTRAVENOUS; SUBCUTANEOUS
Status: DISCONTINUED | OUTPATIENT
Start: 2018-04-10 | End: 2018-04-10 | Stop reason: HOSPADM

## 2018-04-10 RX ORDER — DEXAMETHASONE SODIUM PHOSPHATE 10 MG/ML
INJECTION INTRAMUSCULAR; INTRAVENOUS
Status: DISCONTINUED
Start: 2018-04-10 | End: 2018-04-10 | Stop reason: HOSPADM

## 2018-04-10 RX ORDER — MIDAZOLAM HYDROCHLORIDE 1 MG/ML
INJECTION INTRAMUSCULAR; INTRAVENOUS
Status: DISCONTINUED
Start: 2018-04-10 | End: 2018-04-10 | Stop reason: HOSPADM

## 2018-04-10 RX ORDER — SODIUM CHLORIDE 9 MG/ML
INJECTION, SOLUTION INTRAVENOUS CONTINUOUS
Status: DISCONTINUED | OUTPATIENT
Start: 2018-04-10 | End: 2018-04-10 | Stop reason: HOSPADM

## 2018-04-10 RX ADMIN — FENTANYL CITRATE 50 MCG: 50 INJECTION, SOLUTION INTRAMUSCULAR; INTRAVENOUS at 12:04

## 2018-04-10 RX ADMIN — LIDOCAINE HYDROCHLORIDE 10 ML: 10 INJECTION, SOLUTION EPIDURAL; INFILTRATION; INTRACAUDAL; PERINEURAL at 12:04

## 2018-04-10 RX ADMIN — SODIUM CHLORIDE: 9 INJECTION, SOLUTION INTRAVENOUS at 12:04

## 2018-04-10 RX ADMIN — DEXAMETHASONE SODIUM PHOSPHATE 10 MG: 10 INJECTION INTRAMUSCULAR; INTRAVENOUS at 12:04

## 2018-04-10 RX ADMIN — MIDAZOLAM HYDROCHLORIDE 2 MG: 2 INJECTION, SOLUTION INTRAMUSCULAR; INTRAVENOUS at 12:04

## 2018-04-10 RX ADMIN — SODIUM CHLORIDE 4 ML: 9 INJECTION, SOLUTION INTRAMUSCULAR; INTRAVENOUS; SUBCUTANEOUS at 12:04

## 2018-04-10 NOTE — DISCHARGE INSTRUCTIONS
Recovery After Procedural Sedation (Adult)  You have been given medicine by vein to make you sleep during your surgery. This may have included both a pain medicine and sleeping medicine. Most of the effects have worn off. But you may still have some drowsiness for the next 6 to 8 hours.  Home care  Follow these guidelines when you get home:  · For the next 8 hours, you should be watched by a responsible adult. This person should make sure your condition is not getting worse.  · Don't drink any alcohol for the next 24 hours.  · Don't drive, operate dangerous machinery, or make important business or personal decisions during the next 24 hours.  Note: Your healthcare provider may tell you not to take any medicine by mouth for pain or sleep in the next 4 hours. These medicines may react with the medicines you were given in the hospital. This could cause a much stronger response than usual.  Follow-up care  Follow up with your healthcare provider if you are not alert and back to your usual level of activity within 12 hours.  When to seek medical advice  Call your healthcare provider right away if any of these occur:  · Drowsiness gets worse  · Weakness or dizziness gets worse  · Repeated vomiting  · You can't be awakened   Date Last Reviewed: 10/18/2016  © 2017-2798 [employer]. [address] [place] [address], [place] [address]. All rights reserved. This information is not intended as a substitute for professional medical care. Always follow your healthcare professional's instructions.      Pain injection instructions:     Steroids take about a 2 weeks to relieve pain.  Initially you may get pain relief from the local anesthetic but this may wear off before the steroid works.    No driving for 24 hrs   Activity as tolerated- gradually increase activities.  Dont lift over 10 lbs for 24 hrs   No heat at injection sites x 2 days  Use ice for mild swelling and for comfort.  May shower today. No baths for two days.       Resume Aspirin, Plavix, or Coumadin the day after the procedure unless otherwise instructed.   If diabetic,monitor your glucose carefully as steroids can increase glucose level    Seek immediate medical help for:   Severe increase in your usual pain or appearance of new pain.  Prolonged (mor than 8 hours) or increasing weakness or numbness in the legs or arms.    - Numbing medicine was injected that affects nerves that carry information from       muscles to brain and vice versa.  This numbness can last 6-8 hrs so be very careful walking.    Fever above 101 ,Drainage,redness,active bleeding, or increased swelling at the injection site.  Headache, shortness of breath, chest pain, or breathing problems.

## 2018-04-10 NOTE — DISCHARGE SUMMARY
[facility]  Discharge Note  Short Stay    Admit Date: 4/10/2018    Discharge Date and Time: 4/10/2018    Attending Physician: Galo Clancy MD     Discharge Provider: Galo Clancy    Diagnoses:  Active Hospital Problems    Diagnosis  POA    *Cervical radiculitis [M54.12]  Yes      Resolved Hospital Problems    Diagnosis Date Resolved POA   No resolved problems to display.       Hospital Course: Cervical CHRISTOPHER  Discharged Condition: Good    Final Diagnoses:   Active Hospital Problems    Diagnosis  POA    *Cervical radiculitis [M54.12]  Yes      Resolved Hospital Problems    Diagnosis Date Resolved POA   No resolved problems to display.       Disposition: Home or Self Care    Follow up/Patient Instructions:    Medications:  Reconciled Home Medications:      Medication List      CONTINUE taking these medications    amiodarone 100 MG Tab  Commonly known as:  PACERONE  Take 2 tablets (200 mg total) by mouth once daily.     ammonium lactate 12 % lotion  Commonly known as:  LAC-HYDRIN     ATROPINE SULFATE (PF) OPHT  Apply to eye.     COSOPT 22.3-6.8 mg/mL ophthalmic solution  Generic drug:  dorzolamide-timolol 2-0.5%  Place 1 drop into both eyes 2 (two) times daily. Twice a day     furosemide 20 MG tablet  Commonly known as:  LASIX  Take 20 mg by mouth once daily.     gabapentin 300 MG capsule  Commonly known as:  NEURONTIN  TAKE ONE CAPSULE BY MOUTH ONCE DAILY IN THE EVENING     homatropine 5 % ophthalmic solution  Commonly known as:  ISOPTO HOMATROPINE  INSTILL 1 DROP IN RIGHT EYE DAILY     INV metoprolol tartrate 50 MG Tab  Commonly known as:  LOPRESSOR  Take 1 tablet (50 mg total) by mouth 2 (two) times daily.     * LORazepam 1 MG tablet  Commonly known as:  ATIVAN  TAKE 1 TABLET BY MOUTH DAILY     * LORazepam 0.5 MG tablet  Commonly known as:  ATIVAN  Take 0.5 mg by mouth daily as needed.     losartan 100 MG tablet  Commonly known as:  COZAAR  TAKE 1 TABLET (100 MG TOTAL) BY MOUTH ONCE DAILY.     minocycline 100  MG capsule  Commonly known as:  MINOCIN,DYNACIN  Take 100 mg by mouth 2 (two) times daily.     pantoprazole 40 MG tablet  Commonly known as:  PROTONIX  Take 1 tablet (40 mg total) by mouth once daily.     rosuvastatin 40 MG Tab  Commonly known as:  CRESTOR  TAKE 1 TABLET (40 MG TOTAL) BY MOUTH EVERY EVENING.     timolol maleate 0.5% 0.5 % Drop  Commonly known as:  TIMOPTIC     VIIBRYD 40 mg Tab tablet  Generic drug:  vilazodone  Take 40 mg by mouth once daily.     XARELTO 20 mg Tab  Generic drug:  rivaroxaban  TAKE 1 TABLET (20 MG TOTAL) BY MOUTH DAILY WITH DINNER OR EVENING MEAL.        * This list has 2 medication(s) that are the same as other medications prescribed for you. Read the directions carefully, and ask your doctor or other care provider to review them with you.            STOP taking these medications    AZOPT 1 % ophthalmic suspension  Generic drug:  brinzolamide     halobetasol 0.05 % cream  Commonly known as:  ULTRAVATE     metroNIDAZOLE 0.75 % gel  Commonly known as:  METROGEL     metroNIDAZOLE 0.75 % Lotn            Discharge Procedure Orders  Call MD for:  temperature >100.4     Call MD for:  persistent nausea and vomiting or diarrhea     Call MD for:  severe uncontrolled pain     Call MD for:  redness, tenderness, or signs of infection (pain, swelling, redness, odor or green/yellow discharge around incision site)     Call MD for:  difficulty breathing or increased cough     Call MD for:  severe persistent headache          Follow up with MD in 2-3 weeks    Discharge Procedure Orders (must include Diet, Follow-up, Activity):    Discharge Procedure Orders (must include Diet, Follow-up, Activity)  Call MD for:  temperature >100.4     Call MD for:  persistent nausea and vomiting or diarrhea     Call MD for:  severe uncontrolled pain     Call MD for:  redness, tenderness, or signs of infection (pain, swelling, redness, odor or green/yellow discharge around incision site)     Call MD for:  difficulty  breathing or increased cough     Call MD for:  severe persistent headache

## 2018-04-10 NOTE — OP NOTE
PROCEDURE DATE: 4/10/2018    Procedure: C7-T1 cervical interlaminar epidural steroid injection under utilizing fluoroscopy.    Diagnosis: Cervical Degenerative Disc Diease; Cervical Radiculitis  POSTOP DIAGNOSIS: SAME    Physician: Galo Clancy MD    Medications injected:  Dexamethasone 10mg followed by a slow injection of 4 mL sterile, preservative-free normal saline.    Local anesthetic used: Lidocaine 1%, 2 ml.    Sedation Medications: RN IV sedation    Complications:  None    Estimated blood loss: None    Technique:  A time-out was taken to identify patient and procedure prior to starting the procedure.  With the patient laying in a prone position with the neck in a mid-flexed forward position, the area was prepped and draped in the usual sterile fashion using ChloraPrep and a fenestrated drape.  The area was determined under AP fluoroscopic guidance.  Local anesthetic was given using a 25-gauge 1.5 inch needle by raising a wheal and then infiltrating ventrally.  A 3.5 inch 20-gauge Touhy needle was introduced under fluoroscopic guidance to meet the lamina of C7.  The needle was then hinged under the lamina then advanced using loss of resistance technique.  Once the tip of the needle was in the desired position, the 1ml contrast dye Omnipaque was injected to determine placement and no uptake.  The steroid was then injected slowly followed by a slow injection of 4 mL of the sterile preservative-free normal saline.  The patient tolerated the procedure well.    The patient was monitored after the procedure and was given post-procedure and discharge instructions to follow at home. The patient was discharged in a stable condition.

## 2018-04-10 NOTE — H&P (VIEW-ONLY)
Referring Physician: No ref. provider found    PCP: Jan Olivo MD    CC:  R shoulder and neck pain    Interval History:  Mrs. Dela Cruz is a 73 y.o. female with neck pain who presents today for f/u and evaluation. She is s/p lumbar MB RFA L3, 4, 5 that has provided moderate benefit. Right sided neck and shoulder pain has started to return to baseline. She is s/p cervical CHRISTOPHER in January that provided  80% relief of neck and shoulder pain. Pain improved after 2 weeks. Cervical TPI caused increased pain.  She continues to take Gabapentin. Denies LE weakness or b/b changes. Pain today is rated 4/10.    HPI:   Ayla Dela Cruz is a 73 y.o. female who presents as a new patient from Dr. Grubbs for 2.5 month history of R shoulder/mid back pain. The pain first began when she lifted some books at a bookstore in early November. She began feeling the pain in her R lateral neck/suprascapular region, with radiation down the back/side of her arm to the top of the elbow. She was treated with some topical creams, chiropractor adjustments, and then was placed on gabapentin by Dr. Grubbs, which has helped her pain a considerable amount. She states that the pain is intermittent, aching, sharp, shooting & radiates to her elbow. Worsened by sitting, or lying in bed. 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     ROS:  CONSTITUTIONAL: No fevers, chills, night sweats, wt. loss, appetite changes  SKIN: no rashes or itching  ENT: No headaches, head trauma, vision changes, or eye pain  LYMPH NODES: None noticed   CV: No chest pain, palpitations.   RESP: No shortness of breath, dyspnea on exertion, cough, wheezing, or hemoptysis  GI: No nausea, emesis, diarrhea, constipation, melena, hematochezia, pain.    : No dysuria, hematuria, urgency, or frequency   HEME: No easy bruising, bleeding problems  PSYCHIATRIC: No anxiety, psychosis, hallucinations. +ve depression  NEURO: No seizures, memory loss, dizziness, +ve difficulty  sleeping  MSK: +HPI      Past Medical History:   Diagnosis Date    Anticoagulant long-term use     Anxiety     Arthritis     Atrial fibrillation     Cancer     skin    CHF (congestive heart failure)     Depression     DVT (deep venous thrombosis)     GERD (gastroesophageal reflux disease)     Glaucoma (increased eye pressure)     Hyperlipidemia     diet controlled    Hypertension     Interstitial lung disease     Pneumonia 1/31/2014    Stroke 6-3-14    Stroke     TIA (transient ischemic attack)     TIA (transient ischemic attack)      Past Surgical History:   Procedure Laterality Date    2 heart ablations      3 in total    bilateral cataracts      CHOLECYSTECTOMY      COLONOSCOPY N/A 1/19/2017    Procedure: COLONOSCOPY and EGD;  Surgeon: Jonathan Schultz MD;  Location: [facility];  Service: Endoscopy;  Laterality: N/A;    pyloristenosis      skin cancer removal       TONSILLECTOMY       Family History   Problem Relation Age of Onset    Heart disease Father     Ulcers Father     Arthritis Mother     Asthma Mother     Rheum arthritis Mother     Pneumonia Mother     Depression Son     Alzheimer's disease Maternal Uncle     Rheum arthritis Maternal Grandmother     Emphysema Maternal Grandfather     Cancer Maternal Grandfather      kidney    Kidney disease Maternal Grandfather     Cancer Paternal Grandmother      lung= smoker    Pneumonia Paternal Grandfather     Breast cancer Neg Hx     Ovarian cancer Neg Hx      Social History     Social History    Marital status:      Spouse name: N/A    Number of children: N/A    Years of education: N/A     Social History Main Topics    Smoking status: Never Smoker    Smokeless tobacco: Never Used    Alcohol use No    Drug use: No    Sexual activity: Yes     Partners: Male     Other Topics Concern    None     Social History Narrative    None         Medications/Allergies: See med card    Vitals:    04/03/18 1101   BP: (!) 140/78  "  Pulse: (!) 47   Weight: 59 kg (130 lb)   Height: 5' 7" (1.702 m)   PainSc:   4   PainLoc: Back         Physical exam:    GENERAL: A and O x3, the patient appears well groomed and is in no acute distress.  Skin: No rashes or obvious lesions  HEENT: normocephalic, atraumatic  CARDIOVASCULAR:  bradycardia  LUNGS: non labored breathing  ABDOMEN: soft, nontender   UPPER EXTREMITIES: Normal alignment, normal range of motion, no atrophy, no skin changes,  hair growth and nail growth normal and equal bilaterally. No swelling, no tenderness.    LOWER EXTREMITIES:  Normal alignment, normal range of motion, no atrophy, no skin changes,  hair growth and nail growth normal and equal bilaterally. No swelling, no tenderness.  CERVICAL SPINE:  Cervical spine: ROM is full in flexion, extension and lateral rotation without increased pain.  Spurling's maneuver causes no neck pain to either side.  Myofascial exam: tender to palpation along suprascapular muscle and anterior pressure of shoulder.  MENTAL STATUS: normal orientation, speech, language, and fund of knowledge for social situation.  Emotional state appropriate.  LUMBAR spine: muscle atrophy noted in left paraspinal muscles.   ROM full in flexion, extension, and lateral rotation   + facet loading on right.     CRANIAL NERVES:  II:  PERRL bilaterally,   III,IV,VI: EOMI.    V:  Facial sensation equal bilaterally  VII:  Facial motor function normal.  VIII:  Hearing equal to finger rub bilaterally  IX/X: Gag normal, palate symmetric  XI:  Shoulder shrug equal, head turn equal  XII:  Tongue midline without fasciculations      MOTOR: Tone and bulk: normal bilateral upper and lower Strength: normal   Delt Bi Tri WE WF     R 5 5 5 5 5 5   L 5 5 5 5 5 5     IP ADD ABD Quad TA Gas HAM  R 5 5 5 5 5 5 5  L 5 5 5 5 5 5 5    SENSATION: Light touch and pinprick intact bilaterally  REFLEXES: normal, symmetric, nonbrisk.  Toes down, no clonus. No hoffmans.  GAIT: normal rise, base, steps, " and arm swing.        Imaging:  MRI C-spine 12/2017  There is a 2 mm retrolisthesis of C4 on C5 and C6 on C7.  There is reversal of the normal cervical lordosis which could relate to neck muscle spasm.The cervical vertebral bodies show normal signal intensity and height with no indication of acute fracture or pathologic marrow replacement process.    There is degenerative disc desiccation at every cervical level with marginal osteophyte formation and disc space narrowing noted at C3-C4, C4-C5, C5-C6, and to a lesser extent, C6-C7.  There is congenital spinal canal narrowing present.    The cervical cord is normal in signal intensity at all levels with no indication of myelomalacia or cord edema. The incidentally observed soft tissues of the neck show no significant abnormalities.    C2-C3: There is bilateral hypertrophic facet arthropathy.  There is left ligamentum flavum thickening.  No definite acquired central canal or neuroforaminal stenosis.    C3-C4: There is a posterior disc osteophyte complex with a superimposed broad central disc protrusion which flattens the ventral cord.  There is ligamentum flavum thickening, bilateral uncovertebral spurring, and bilateral facet arthropathy, left greater than right.  There is moderate overall central canal stenosis, moderate-severe left neuroforaminal stenosis, and moderate right neuroforaminal stenosis.    C4-C5: There is a bulging posterior disc osteophyte complex, ligamentum flavum thickening, bilateral uncovertebral spurring, and facet arthropathy, right greater than left.  There is severe bilateral neural foraminal stenosis and moderate overall central canal stenosis.  No abnormal cord signal.  There is flattening of the ventral cord.    C5-C6: There is a posterior disc osteophyte complex and bilateral uncovertebral spurring, right greater than left.  No left neuroforaminal stenosis.  There is mild-moderate right neuroforaminal stenosis and mild-moderate overall  central canal stenosis.    C6-C7: There is a 2 mm retrolisthesis of C6 on C7 with uncovering of the intervertebral disc and a superimposed posterior disc osteophyte complex.  There is bilateral uncovertebral spurring, left greater than right.  There is severe left and moderate right neuroforaminal stenosis.  There is ligamentum flavum thickening.  There is moderate central canal stenosis with flattening of the ventral cord.    C7-T1: No central canal or neuroforaminal stenosis. No disc protrusion or extrusion.    Assessment:  Mrs. Dela Cruz is a 72 y.o. female with   1. Cervical radiculopathy    2. DDD (degenerative disc disease), cervical    3. Myalgia    4. Other specified spondylopathies, lumbar region        Plan:  1.  I have stressed the importance of physical activity and exercise to improve overall health  2. Schedule repeat cervical epidural steroid injection at C7-T1. I have explained the risks, benefits, and alternatives of the procedure in detail. The patient voices understanding and all questions have been answered. The patient agrees to proceed as planned. Written Consent obtained. .   3.Monitor progress from lumbar MB RFA  4. F/u prn

## 2018-04-10 NOTE — INTERVAL H&P NOTE
The patient has been examined and the H&P has been reviewed:    I concur with the findings and no changes have occurred since H&P was written.   This patient has been cleared for surgery in an ambulatory surgical facility    ASA 3,  Mallampatti Score 3  No history of anesthetic complications  Plan for RN IV sedation      Anesthesia/Surgery risks, benefits and alternative options discussed and understood by patient/family.          Active Hospital Problems    Diagnosis  POA    Cervical radiculitis [M54.12]  Yes      Resolved Hospital Problems    Diagnosis Date Resolved POA   No resolved problems to display.

## 2018-04-11 VITALS
DIASTOLIC BLOOD PRESSURE: 72 MMHG | WEIGHT: 130 LBS | SYSTOLIC BLOOD PRESSURE: 170 MMHG | OXYGEN SATURATION: 96 % | RESPIRATION RATE: 18 BRPM | TEMPERATURE: 98 F | HEART RATE: 54 BPM | HEIGHT: 67 IN | BODY MASS INDEX: 20.4 KG/M2

## 2018-05-02 ENCOUNTER — OFFICE VISIT (OUTPATIENT)
Dept: PAIN MEDICINE | Facility: CLINIC | Age: 73
End: 2018-05-02
Payer: MEDICARE

## 2018-05-02 VITALS
SYSTOLIC BLOOD PRESSURE: 169 MMHG | HEIGHT: 67 IN | WEIGHT: 130 LBS | DIASTOLIC BLOOD PRESSURE: 66 MMHG | BODY MASS INDEX: 20.4 KG/M2 | HEART RATE: 57 BPM

## 2018-05-02 DIAGNOSIS — M54.12 CERVICAL RADICULOPATHY: Primary | ICD-10-CM

## 2018-05-02 DIAGNOSIS — M50.30 DDD (DEGENERATIVE DISC DISEASE), CERVICAL: ICD-10-CM

## 2018-05-02 DIAGNOSIS — G89.29 CHRONIC MIDLINE LOW BACK PAIN WITHOUT SCIATICA: ICD-10-CM

## 2018-05-02 DIAGNOSIS — M54.50 CHRONIC MIDLINE LOW BACK PAIN WITHOUT SCIATICA: ICD-10-CM

## 2018-05-02 PROCEDURE — 99214 OFFICE O/P EST MOD 30 MIN: CPT | Mod: PBBFAC,PN | Performed by: PHYSICIAN ASSISTANT

## 2018-05-02 PROCEDURE — 99213 OFFICE O/P EST LOW 20 MIN: CPT | Mod: S$PBB,,, | Performed by: PHYSICIAN ASSISTANT

## 2018-05-02 PROCEDURE — 99999 PR PBB SHADOW E&M-EST. PATIENT-LVL IV: CPT | Mod: PBBFAC,,, | Performed by: PHYSICIAN ASSISTANT

## 2018-05-02 RX ORDER — METRONIDAZOLE 7.5 MG/G
LOTION TOPICAL
Status: ON HOLD | COMMUNITY
Start: 2018-04-17 | End: 2018-07-03 | Stop reason: HOSPADM

## 2018-05-02 NOTE — PATIENT INSTRUCTIONS
Exercises To Strengthen Your Lower Back  Strong lower-back and abdominal muscles work together to support your spine. These exercises will help strengthen the muscles of the lower back. It is important that you begin exercising slowly and increase levels gradually.  Always begin any exercise program with stretching. If you feel pain while doing any of these exercises, stop and talk to your doctor about a more specific exercise program that suits your condition better.  Low Back Stretch  · Lie on your back with your knees bent and both feet on the ground.  ·   Slowly raise your left knee to your chest as you flatten your lower back against the floor. Hold for 5 seconds.  · Relax and repeat the exercise with your right knee.  · Do 10 of these exercises for each leg.  · Repeat hugging both knees to your chest at the same time.  Building Lower Back Strength  1. Kneeling Lumbar Extension: Begin on your hands and knees. Simultaneously raise and straighten your right arm and left leg until they are parallel to the ground. Hold for 2 seconds and come back slowly to a starting position. Repeat with left arm and right leg, alternating 10 times.  2. Prone Lumbar Extension: Lie face down, arms extended overhead, palms on the floor. Simultaneously raise your right arm and left leg as high as comfortably possible. Hold for 10 seconds and slowly return to start. Repeat with left arm and right leg, alternating 10 times. Gradually build up to 20 times. (Advanced: Repeat this exercise raising both arms and both legs a few inches off the floor at the same time. Hold for 5 seconds and release.)  3. Pelvic Tilt: Lie on the floor on your back with your knees bent at 90 degrees. Your feet should be flat on the floor. Inhale, exhale, then slowly contract your abdominal muscles bringing your navel toward your spine. Let your pelvis rock back until your lower back is flat on the floor. Hold for 10 seconds while breathing  smoothly.  4. Abdominal Crunch: Perform a Pelvic Tilt (above) flattening your lower back against the floor. Holding the tension in your abdominal muscles, take another breath and raise your shoulder blades off the ground (this is not a full sit-up). Keep your head in line with your body (dont bend your neck forward). Hold for 2 seconds, then slowly lower.      Back Exercises: Abdominal Lift  The Abdominal Lift strengthens your lower abdominal muscles, helping you keep your pelvis and back stable.  · Lie on the floor with both knees bent. Put your feet flat on the floor and your arms by your sides. Tighten your abdominal muscles.  · Lift one bent knee and move it toward your upper body. Keep your abdominal muscles tight and your back flat on the floor. Hold for 10 seconds.  · Repeat 3 times. Then, switch legs.         Back Exercises: Bridge  The Bridge exercise strengthens your abdominal, buttocks, and hamstring muscles. This helps keep your back stable and aligned when you walk.  · Lie on the floor with your back and palms flat. Bend your knees. Keep your feet flat on the floor.  · Contract your abdominal and buttocks muscles. Slowly lift your buttocks off the floor until there is a straight line from your knees to your shoulders.  · Hold for 5  seconds. Repeat 10 times.          Back Exercises: Hip Lift        To start, lie on your back with your knees bent and feet flat on the floor. Dont press your neck or lower back to the floor. Breathe deeply. You should feel comfortable and relaxed in this position.  · Slowly raise your hips upward.  · Tighten your abdomen and buttocks. Be careful not to arch your back.  · Hold for 5 seconds. Lower your hips to the floor.  · Repeat 10 times.  For your safety, check with your healthcare provider before starting an exercise program.       Back Exercises: Hip Rotator Stretch        To start, lie on your back with your knees bent and feet flat on the floor. Dont press your  neck or lower back to the floor. Breathe deeply. You should feel comfortable and relaxed in this position.  · Rest your right ankle on your left knee.  · Place a towel behind your left thigh and use it to pull the knee toward your chest. Feel the stretch in your buttocks.  · Hold for 30-60 seconds. Release.  · Repeat 2 times.  · Switch legs.   For your safety, check with your healthcare provider before starting an exercise program.       Back Exercises: Knee Lift        To start, lie on your back with your knees bent and feet flat on the floor. Dont press your neck or lower back to the floor. Breathe deeply. You should feel comfortable and relaxed in this position.  · Lift one bent knee and move it toward your upper body. Keep your abdominal muscles tight and your back flat on the floor.  · Hold for 10 seconds. Return to start position.  · Repeat 3 times.  · Switch legs.        Back Exercises: Leg Pull        To start, lie on your back with your knees bent and feet flat on the floor. Dont press your neck or lower back to the floor. Breathe deeply. You should feel comfortable and relaxed in this position.  · Pull one knee to your chest.  · Hold for 30-60 seconds. Return to starting position.  · Repeat 2 times.  · Switch legs.  · For a double leg pull, pull both legs to your chest at the same time. Repeat 2 times.  For your safety, check with your healthcare provider before starting an exercise program.       Back Exercises: Leg Reach        Do this exercise on your hands and knees. Keep your knees under your hips and your hands under your shoulders. Keep your spine in a neutral position (not arched or sagging). Be sure to maintain your necks natural curve.  · Extend one leg straight back. Dont arch your back or let your head or body sag.  · Hold for 5 seconds. Return to starting position.  · Repeat 5 times.  · Switch legs.       Back Exercises: Lower Back Rotation  To start, lie on your back with your knees bent  and feet flat on the floor. Dont press your neck or lower back to the floor. Breathe deeply. You should feel comfortable and relaxed in this position.  · Drop both knees to one side. Turn your head to the other side. Keep your shoulders flat on the floor.  · Hold for 20 seconds.  · Slowly switch sides.  · Repeat 2 times.                                   Back Exercises: Lower Back Stretch                        To start, sit in a chair with your feet flat on the floor. Shift your weight slightly forward to avoid rounding your back. Relax, and keep your ears, shoulders, and hips aligned.  · Sit with your feet well apart.  · Bend forward and touch the floor with the backs of your hands. Relax and let your body drop.  · Hold for 20 seconds. Return to starting position.  · Repeat 2 times.       Back Exercises: Partial Curl-Ups        To start, lie on your back with your knees bent and feet flat on the floor. Dont press your neck or lower back to the floor. Breathe deeply. You should feel comfortable and relaxed in this position.  · Cross your arms loosely.  · Tighten your abdomen and curl nursing home up, keeping your head in line with your shoulders.  · Hold for 5 seconds. Uncurl to lie down.  · Repeat 5 times.       Back Exercises: Pelvic Tilt  To start, lie on your back with your knees bent and feet flat on the floor. Dont press your neck or lower back to the floor. Breathe deeply. You should feel comfortable and relaxed in this position.  · Tighten your abdomen and buttocks, and press your lower back toward the floor. This should be a small, subtle movement.  · Hold for 5 seconds. Release.  · Repeat 5 times.                           Back Exercises: Seated Rotation      To start, sit in a chair with your feet flat on the floor. Shift your weight slightly forward to avoid rounding your back. Relax, and keep your ears, shoulders, and hips aligned.  · Fold your arms, elbows just below shoulder height.  · Turn from the  waist with hips forward. Turn your head last.  · Hold for a count of 5. Return to starting position.  · Repeat 5 times on one side. Then switch sides.          Back Exercises: Side Stretch  To start, sit in a chair with your feet flat on the floor. Shift your weight slightly forward to avoid rounding your back. Relax. Keep your ears, shoulders, and hips aligned.  · Stretch your right arm overhead.  · Slowly bend to the left. Dont twist your torso.  · Hold for 20 seconds. Return to starting position.  · Repeat 2 times. Then, switch to the other side.      © 2319-5531 [employer]. [address] [place] [address], [place] [address]. All rights reserved. This information is not intended as a substitute for professional medical care. Always follow your healthcare professional's instructions.

## 2018-05-02 NOTE — PROGRESS NOTES
Referring Physician: No ref. provider found    PCP: Jan Olivo MD    CC:  R shoulder and neck pain    Interval History:  Mrs. Dela Cruz is a 73 y.o. female with neck pain who presents today for f/u s/p Cervical CHRISTOPHER at C7-T1. Reports 70% relief. She is s/p lumbar MB RFA L3, 4, 5 that has provided moderate benefit in February. Reports low back is stiff in the Am. Improves with activity and acetaminophen. Cervical TPI caused increased pain.  She continues to take Gabapentin. Denies LE weakness or b/b changes. Pain today is rated 4/10.    HPI:   Ayla Dela Cruz is a 73 y.o. female who presents as a new patient from Dr. Grubbs for 2.5 month history of R shoulder/mid back pain. The pain first began when she lifted some books at a bookstore in early November. She began feeling the pain in her R lateral neck/suprascapular region, with radiation down the back/side of her arm to the top of the elbow. She was treated with some topical creams, chiropractor adjustments, and then was placed on gabapentin by Dr. Grubbs, which has helped her pain a considerable amount. She states that the pain is intermittent, aching, sharp, shooting & radiates to her elbow. Worsened by sitting, or lying in bed. 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     ROS:  CONSTITUTIONAL: No fevers, chills, night sweats, wt. loss, appetite changes  SKIN: no rashes or itching  ENT: No headaches, head trauma, vision changes, or eye pain  LYMPH NODES: None noticed   CV: No chest pain, palpitations.   RESP: No shortness of breath, dyspnea on exertion, cough, wheezing, or hemoptysis  GI: No nausea, emesis, diarrhea, constipation, melena, hematochezia, pain.    : No dysuria, hematuria, urgency, or frequency   HEME: No easy bruising, bleeding problems  PSYCHIATRIC: No anxiety, psychosis, hallucinations. +ve depression  NEURO: No seizures, memory loss, dizziness, +ve difficulty sleeping  MSK: +HPI      Past Medical History:   Diagnosis Date     "Anticoagulant long-term use     Anxiety     Arthritis     Atrial fibrillation     Cancer     skin    CHF (congestive heart failure)     Depression     DVT (deep venous thrombosis)     GERD (gastroesophageal reflux disease)     Glaucoma (increased eye pressure)     Hyperlipidemia     diet controlled    Hypertension     Interstitial lung disease     Pneumonia 1/31/2014    Stroke 6-3-14    Stroke     TIA (transient ischemic attack)     TIA (transient ischemic attack)      Past Surgical History:   Procedure Laterality Date    2 heart ablations      3 in total    bilateral cataracts      CHOLECYSTECTOMY      COLONOSCOPY N/A 1/19/2017    Procedure: COLONOSCOPY and EGD;  Surgeon: Jonathan Schultz MD;  Location: [facility];  Service: Endoscopy;  Laterality: N/A;    pyloristenosis      skin cancer removal       TONSILLECTOMY       Family History   Problem Relation Age of Onset    Heart disease Father     Ulcers Father     Arthritis Mother     Asthma Mother     Rheum arthritis Mother     Pneumonia Mother     Depression Son     Alzheimer's disease Maternal Uncle     Rheum arthritis Maternal Grandmother     Emphysema Maternal Grandfather     Cancer Maternal Grandfather      kidney    Kidney disease Maternal Grandfather     Cancer Paternal Grandmother      lung= smoker    Pneumonia Paternal Grandfather     Breast cancer Neg Hx     Ovarian cancer Neg Hx      Social History     Social History    Marital status:      Spouse name: N/A    Number of children: N/A    Years of education: N/A     Social History Main Topics    Smoking status: Never Smoker    Smokeless tobacco: Never Used    Alcohol use No    Drug use: No    Sexual activity: Yes     Partners: Male     Other Topics Concern    None     Social History Narrative    None         Medications/Allergies: See med card    Vitals:    05/02/18 1037   BP: (!) 169/66   Pulse: (!) 57   Weight: 59 kg (130 lb)   Height: 5' 7" (1.702 m) "   PainSc:   4   PainLoc: Neck         Physical exam:    GENERAL: A and O x3, the patient appears well groomed and is in no acute distress.  Skin: No rashes or obvious lesions  HEENT: normocephalic, atraumatic  CARDIOVASCULAR:  bradycardia  LUNGS: non labored breathing  ABDOMEN: soft, nontender   UPPER EXTREMITIES: Normal alignment, normal range of motion, no atrophy, no skin changes,  hair growth and nail growth normal and equal bilaterally. No swelling, no tenderness.    LOWER EXTREMITIES:  Normal alignment, normal range of motion, no atrophy, no skin changes,  hair growth and nail growth normal and equal bilaterally. No swelling, no tenderness.  CERVICAL SPINE:  Cervical spine: ROM is full in flexion, extension and lateral rotation without increased pain.  Spurling's maneuver causes no neck pain to either side.  Myofascial exam: tender to palpation along suprascapular muscle and anterior pressure of shoulder.  MENTAL STATUS: normal orientation, speech, language, and fund of knowledge for social situation.  Emotional state appropriate.  LUMBAR spine: muscle atrophy noted in left paraspinal muscles.   ROM full in flexion, extension, and lateral rotation   + facet loading on right.     CRANIAL NERVES:  II:  PERRL bilaterally,   III,IV,VI: EOMI.    V:  Facial sensation equal bilaterally  VII:  Facial motor function normal.  VIII:  Hearing equal to finger rub bilaterally  IX/X: Gag normal, palate symmetric  XI:  Shoulder shrug equal, head turn equal  XII:  Tongue midline without fasciculations      MOTOR: Tone and bulk: normal bilateral upper and lower Strength: normal   Delt Bi Tri WE WF     R 5 5 5 5 5 5   L 5 5 5 5 5 5     IP ADD ABD Quad TA Gas HAM  R 5 5 5 5 5 5 5  L 5 5 5 5 5 5 5    SENSATION: Light touch and pinprick intact bilaterally  REFLEXES: normal, symmetric, nonbrisk.  Toes down, no clonus. No hoffmans.  GAIT: normal rise, base, steps, and arm swing.        Imaging:  MRI C-spine 12/2017  There is a 2  mm retrolisthesis of C4 on C5 and C6 on C7.  There is reversal of the normal cervical lordosis which could relate to neck muscle spasm.The cervical vertebral bodies show normal signal intensity and height with no indication of acute fracture or pathologic marrow replacement process.    There is degenerative disc desiccation at every cervical level with marginal osteophyte formation and disc space narrowing noted at C3-C4, C4-C5, C5-C6, and to a lesser extent, C6-C7.  There is congenital spinal canal narrowing present.    The cervical cord is normal in signal intensity at all levels with no indication of myelomalacia or cord edema. The incidentally observed soft tissues of the neck show no significant abnormalities.    C2-C3: There is bilateral hypertrophic facet arthropathy.  There is left ligamentum flavum thickening.  No definite acquired central canal or neuroforaminal stenosis.    C3-C4: There is a posterior disc osteophyte complex with a superimposed broad central disc protrusion which flattens the ventral cord.  There is ligamentum flavum thickening, bilateral uncovertebral spurring, and bilateral facet arthropathy, left greater than right.  There is moderate overall central canal stenosis, moderate-severe left neuroforaminal stenosis, and moderate right neuroforaminal stenosis.    C4-C5: There is a bulging posterior disc osteophyte complex, ligamentum flavum thickening, bilateral uncovertebral spurring, and facet arthropathy, right greater than left.  There is severe bilateral neural foraminal stenosis and moderate overall central canal stenosis.  No abnormal cord signal.  There is flattening of the ventral cord.    C5-C6: There is a posterior disc osteophyte complex and bilateral uncovertebral spurring, right greater than left.  No left neuroforaminal stenosis.  There is mild-moderate right neuroforaminal stenosis and mild-moderate overall central canal stenosis.    C6-C7: There is a 2 mm retrolisthesis of  C6 on C7 with uncovering of the intervertebral disc and a superimposed posterior disc osteophyte complex.  There is bilateral uncovertebral spurring, left greater than right.  There is severe left and moderate right neuroforaminal stenosis.  There is ligamentum flavum thickening.  There is moderate central canal stenosis with flattening of the ventral cord.    C7-T1: No central canal or neuroforaminal stenosis. No disc protrusion or extrusion.    Assessment:  Mrs. Dela Cruz is a 72 y.o. female with   1. Cervical radiculopathy    2. DDD (degenerative disc disease), cervical    3. Chronic midline low back pain without sciatica      Plan:  1.  I have stressed the importance of physical activity and exercise to improve overall health. Home lumbar exercises given  2. Monitor progress and consider repeat cervical epidural steroid injection at C7-T1.   3. Monitor progress from lumbar MB RFA  4. F/u prn

## 2018-05-22 ENCOUNTER — TELEPHONE (OUTPATIENT)
Dept: PAIN MEDICINE | Facility: CLINIC | Age: 73
End: 2018-05-22

## 2018-05-22 ENCOUNTER — OFFICE VISIT (OUTPATIENT)
Dept: PAIN MEDICINE | Facility: CLINIC | Age: 73
End: 2018-05-22
Payer: MEDICARE

## 2018-05-22 VITALS
SYSTOLIC BLOOD PRESSURE: 161 MMHG | BODY MASS INDEX: 20.4 KG/M2 | HEART RATE: 53 BPM | WEIGHT: 130 LBS | HEIGHT: 67 IN | DIASTOLIC BLOOD PRESSURE: 82 MMHG

## 2018-05-22 DIAGNOSIS — M50.30 DDD (DEGENERATIVE DISC DISEASE), CERVICAL: Primary | ICD-10-CM

## 2018-05-22 DIAGNOSIS — M47.892 OTHER SPONDYLOSIS, CERVICAL REGION: ICD-10-CM

## 2018-05-22 PROCEDURE — 99214 OFFICE O/P EST MOD 30 MIN: CPT | Mod: PBBFAC,PN | Performed by: ANESTHESIOLOGY

## 2018-05-22 PROCEDURE — 99999 PR PBB SHADOW E&M-EST. PATIENT-LVL IV: CPT | Mod: PBBFAC,,, | Performed by: ANESTHESIOLOGY

## 2018-05-22 PROCEDURE — 99214 OFFICE O/P EST MOD 30 MIN: CPT | Mod: S$PBB,,, | Performed by: ANESTHESIOLOGY

## 2018-05-22 NOTE — PROGRESS NOTES
Referring Physician: No ref. provider found    PCP: Jan Olivo MD    CC:  R shoulder and neck pain    Interval History:  Mrs. Dela Cruz is a 73 y.o. female with neck pain who presents for f/u s/p Cervical CHRISTOPHER at C7-T1. The pt had a C7-T1 CHRISTOPHER performing 4/10/18. The pt states she experienced 100% relief for 3 weeks. She presents today as a drop in to clinic with concern that her neck and right shoulder pain is currently an 8/10 pain again. No upper extremity sensory or motor deficits. The pt has made no recent changes to her medication regimen. No recent trauma or stimulus for symptom presentation. She is planning on traveling to [place] for 1 month in August. She presents today to see if there are alternative medication regimens/procedures we can offer to improve her persistent pain symptoms.     Prior HPI:   Ayla Dela Cruz is a 73 y.o. female who presents as a new patient from Dr. Grubbs for 2.5 month history of R shoulder/mid back pain. The pain first began when she lifted some books at a bookstore in early November. She began feeling the pain in her R lateral neck/suprascapular region, with radiation down the back/side of her arm to the top of the elbow. She was treated with some topical creams, chiropractor adjustments, and then was placed on gabapentin by Dr. Grubbs, which has helped her pain a considerable amount. She states that the pain is intermittent, aching, sharp, shooting & radiates to her elbow. Worsened by sitting, or lying in bed. 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     ROS:  CONSTITUTIONAL: No fevers, chills, night sweats, wt. loss, appetite changes  SKIN: no rashes or itching  ENT: No headaches, head trauma, vision changes, or eye pain  LYMPH NODES: None noticed   CV: No chest pain, palpitations.   RESP: No shortness of breath, dyspnea on exertion, cough, wheezing, or hemoptysis  GI: No nausea, emesis, diarrhea, constipation, melena, hematochezia, pain.    : No dysuria,  hematuria, urgency, or frequency   HEME: No easy bruising, bleeding problems  PSYCHIATRIC: No anxiety, psychosis, hallucinations. +ve depression  NEURO: No seizures, memory loss, dizziness, +ve difficulty sleeping  MSK: +HPI      Past Medical History:   Diagnosis Date    Anticoagulant long-term use     Anxiety     Arthritis     Atrial fibrillation     Cancer     skin    CHF (congestive heart failure)     Depression     DVT (deep venous thrombosis)     GERD (gastroesophageal reflux disease)     Glaucoma (increased eye pressure)     Hyperlipidemia     diet controlled    Hypertension     Interstitial lung disease     Pneumonia 1/31/2014    Stroke 6-3-14    Stroke     TIA (transient ischemic attack)     TIA (transient ischemic attack)      Past Surgical History:   Procedure Laterality Date    2 heart ablations      3 in total    bilateral cataracts      CHOLECYSTECTOMY      COLONOSCOPY N/A 1/19/2017    Procedure: COLONOSCOPY and EGD;  Surgeon: Jonathan Schultz MD;  Location[facility];  Service: Endoscopy;  Laterality: N/A;    pyloristenosis      skin cancer removal       TONSILLECTOMY       Family History   Problem Relation Age of Onset    Heart disease Father     Ulcers Father     Arthritis Mother     Asthma Mother     Rheum arthritis Mother     Pneumonia Mother     Depression Son     Alzheimer's disease Maternal Uncle     Rheum arthritis Maternal Grandmother     Emphysema Maternal Grandfather     Cancer Maternal Grandfather         kidney    Kidney disease Maternal Grandfather     Cancer Paternal Grandmother         lung= smoker    Pneumonia Paternal Grandfather     Breast cancer Neg Hx     Ovarian cancer Neg Hx      Social History     Social History    Marital status:      Spouse name: N/A    Number of children: N/A    Years of education: N/A     Social History Main Topics    Smoking status: Never Smoker    Smokeless tobacco: Never Used    Alcohol use No    Drug  "use: No    Sexual activity: Yes     Partners: Male     Other Topics Concern    None     Social History Narrative    None         Medications/Allergies: See med card    Vitals:    05/22/18 0918   BP: (!) 161/82   Pulse: (!) 53   Weight: 59 kg (130 lb)   Height: 5' 7" (1.702 m)   PainSc:   8   PainLoc: Neck         Physical exam:    GENERAL: A and O x3, the patient appears well groomed and is in no acute distress.  Skin: No rashes or obvious lesions  HEENT: normocephalic, atraumatic  CARDIOVASCULAR:  bradycardia  LUNGS: non labored breathing  ABDOMEN: soft, nontender   UPPER EXTREMITIES: Normal alignment, normal range of motion, no atrophy, no skin changes,  hair growth and nail growth normal and equal bilaterally. No swelling, no tenderness.    LOWER EXTREMITIES:  Normal alignment, normal range of motion, no atrophy, no skin changes,  hair growth and nail growth normal and equal bilaterally. No swelling, no tenderness.  CERVICAL SPINE:  Cervical spine: ROM is full in flexion, extension and lateral rotation with increased pain on the right side with lateral rotation and extension.   Spurling's maneuver causes no neck pain to either side.  Myofascial exam: tender to palpation along suprascapular muscle and anterior pressure of shoulder.  MENTAL STATUS: normal orientation, speech, language, and fund of knowledge for social situation.  Emotional state appropriate.       CRANIAL NERVES:  II:  PERRL bilaterally,   III,IV,VI: EOMI.    V:  Facial sensation equal bilaterally  VII:  Facial motor function normal.  VIII:  Hearing equal to finger rub bilaterally  IX/X: Gag normal, palate symmetric  XI:  Shoulder shrug equal, head turn equal  XII:  Tongue midline without fasciculations      MOTOR: Tone and bulk: normal bilateral upper and lower Strength: normal   Delt Bi Tri WE WF     R 5 5 5 5 5 5   L 5 5 5 5 5 5     IP ADD ABD Quad TA Gas HAM  R 5 5 5 5 5 5 5  L 5 5 5 5 5 5 5    SENSATION: Light touch and pinprick intact " bilaterally  REFLEXES: normal, symmetric, nonbrisk.  Toes down, no clonus. No hoffmans.  GAIT: normal rise, base, steps, and arm swing.        Imaging:  MRI C-spine 12/2017  There is a 2 mm retrolisthesis of C4 on C5 and C6 on C7.  There is reversal of the normal cervical lordosis which could relate to neck muscle spasm.The cervical vertebral bodies show normal signal intensity and height with no indication of acute fracture or pathologic marrow replacement process.    There is degenerative disc desiccation at every cervical level with marginal osteophyte formation and disc space narrowing noted at C3-C4, C4-C5, C5-C6, and to a lesser extent, C6-C7.  There is congenital spinal canal narrowing present.    The cervical cord is normal in signal intensity at all levels with no indication of myelomalacia or cord edema. The incidentally observed soft tissues of the neck show no significant abnormalities.    C2-C3: There is bilateral hypertrophic facet arthropathy.  There is left ligamentum flavum thickening.  No definite acquired central canal or neuroforaminal stenosis.    C3-C4: There is a posterior disc osteophyte complex with a superimposed broad central disc protrusion which flattens the ventral cord.  There is ligamentum flavum thickening, bilateral uncovertebral spurring, and bilateral facet arthropathy, left greater than right.  There is moderate overall central canal stenosis, moderate-severe left neuroforaminal stenosis, and moderate right neuroforaminal stenosis.    C4-C5: There is a bulging posterior disc osteophyte complex, ligamentum flavum thickening, bilateral uncovertebral spurring, and facet arthropathy, right greater than left.  There is severe bilateral neural foraminal stenosis and moderate overall central canal stenosis.  No abnormal cord signal.  There is flattening of the ventral cord.    C5-C6: There is a posterior disc osteophyte complex and bilateral uncovertebral spurring, right greater than  left.  No left neuroforaminal stenosis.  There is mild-moderate right neuroforaminal stenosis and mild-moderate overall central canal stenosis.    C6-C7: There is a 2 mm retrolisthesis of C6 on C7 with uncovering of the intervertebral disc and a superimposed posterior disc osteophyte complex.  There is bilateral uncovertebral spurring, left greater than right.  There is severe left and moderate right neuroforaminal stenosis.  There is ligamentum flavum thickening.  There is moderate central canal stenosis with flattening of the ventral cord.    C7-T1: No central canal or neuroforaminal stenosis. No disc protrusion or extrusion.    Assessment:  Mrs. Dela Cruz is a 72 y.o. female with chronic neck and shoulder pain.     1. DDD (degenerative disc disease), cervical    2. Other spondylosis, cervical region          Plan:  - Recommend physical therapy and home exercise regimen to improve strength   - Will increase gabapentin dosing to 600 mg daily from 300 mg daily as pt currently describes this medication providing pain relief and she is currently taking as prescribed.   - Schedule for Right cervical MBB at C4, C5, and C6. If pt experiences significant relief with this diagnostic procedure we will schedule for RFA.   - Offered pt repeat CHRISTOPHER with understanding that she can only have this procedure 4 times per year. The pt would prefer to postpone this intervention closer to her scheduled trip in August to [place] for 1 month.   - RTC following procedure

## 2018-05-22 NOTE — TELEPHONE ENCOUNTER
This patient is scheduled on 6/7/18 for Medial Branch Block. If successful, patient will be scheduled about 1-2 weeks after for Radiofrequency Thermocoagulation. We are requesting clearance to hold Xarelto 2-3 days prior to Radiofrequency Thermocoagulation.

## 2018-05-23 DIAGNOSIS — M47.892 OTHER SPONDYLOSIS, CERVICAL REGION: Primary | ICD-10-CM

## 2018-05-25 ENCOUNTER — OFFICE VISIT (OUTPATIENT)
Dept: CARDIOLOGY | Facility: CLINIC | Age: 73
End: 2018-05-25
Payer: MEDICARE

## 2018-05-25 VITALS
BODY MASS INDEX: 21.18 KG/M2 | SYSTOLIC BLOOD PRESSURE: 162 MMHG | WEIGHT: 134.94 LBS | OXYGEN SATURATION: 96 % | HEIGHT: 67 IN | HEART RATE: 61 BPM | DIASTOLIC BLOOD PRESSURE: 70 MMHG

## 2018-05-25 DIAGNOSIS — I11.9 HYPERTENSIVE LEFT VENTRICULAR HYPERTROPHY, WITHOUT HEART FAILURE: ICD-10-CM

## 2018-05-25 DIAGNOSIS — R80.9 MICROALBUMINURIA: ICD-10-CM

## 2018-05-25 DIAGNOSIS — Z98.890 S/P ABLATION OF ATRIAL FLUTTER: ICD-10-CM

## 2018-05-25 DIAGNOSIS — Z86.79 S/P ABLATION OF ATRIAL FIBRILLATION: ICD-10-CM

## 2018-05-25 DIAGNOSIS — Z86.79 S/P ABLATION OF ATRIAL FLUTTER: ICD-10-CM

## 2018-05-25 DIAGNOSIS — I48.0 PAROXYSMAL ATRIAL FIBRILLATION: ICD-10-CM

## 2018-05-25 DIAGNOSIS — Z98.890 S/P ABLATION OF ATRIAL FIBRILLATION: ICD-10-CM

## 2018-05-25 DIAGNOSIS — I10 ESSENTIAL HYPERTENSION: ICD-10-CM

## 2018-05-25 DIAGNOSIS — R00.2 PALPITATIONS: ICD-10-CM

## 2018-05-25 DIAGNOSIS — I50.32 CHRONIC DIASTOLIC HEART FAILURE: ICD-10-CM

## 2018-05-25 DIAGNOSIS — Z86.73 H/O: CVA (CEREBROVASCULAR ACCIDENT): ICD-10-CM

## 2018-05-25 DIAGNOSIS — Z79.899 LONG TERM CURRENT USE OF AMIODARONE: ICD-10-CM

## 2018-05-25 DIAGNOSIS — R06.09 DOE (DYSPNEA ON EXERTION): Primary | ICD-10-CM

## 2018-05-25 DIAGNOSIS — G45.8 OTHER SPECIFIED TRANSIENT CEREBRAL ISCHEMIAS: ICD-10-CM

## 2018-05-25 DIAGNOSIS — E78.00 PURE HYPERCHOLESTEROLEMIA: ICD-10-CM

## 2018-05-25 PROCEDURE — 99213 OFFICE O/P EST LOW 20 MIN: CPT | Mod: PBBFAC,PO | Performed by: INTERNAL MEDICINE

## 2018-05-25 PROCEDURE — 99215 OFFICE O/P EST HI 40 MIN: CPT | Mod: S$PBB,,, | Performed by: INTERNAL MEDICINE

## 2018-05-25 PROCEDURE — 99999 PR PBB SHADOW E&M-EST. PATIENT-LVL III: CPT | Mod: PBBFAC,,, | Performed by: INTERNAL MEDICINE

## 2018-05-25 RX ORDER — SPIRONOLACTONE 25 MG/1
25 TABLET ORAL DAILY
Qty: 90 TABLET | Refills: 3 | Status: ON HOLD | OUTPATIENT
Start: 2018-05-25 | End: 2020-01-24 | Stop reason: HOSPADM

## 2018-05-25 NOTE — PROGRESS NOTES
Subjective:    Patient ID:  Ayla Dela Cruz is a 73 y.o. female who presents for evaluation of No chief complaint on file.  For PAF was on amiodarone 100 mg daily , AVILA, post AF - ablation, LVH, chronic diastolic HF   PCP: Dr. Olivo, see annually, do labs  Surgeon: Dr. Gonsalez, and Dr. Dc  Rheumatologist: Dr. Pak  Prior cardiologist: Dr. Gibson, last seen over a year  Pulmonary: Dr. White  Psychologist: Nu Fermin, AGUILAR, in [place]  Gyn: Dr. Jordan  GI: Dr. Schultz  Neurologist: Dr. Ramirez  Dermatologist: Dr. Zavaleta, [place]  Pain: Dr. Clancy, injections to neck and both hips very helpful  Lives with , Jan, here with patient, non-smoker, history of prostate CA,  51 years on 6/24  daughter, Lyric, source of stress  Restarted , now 2-4 times weekly, still enjoys it, playing the flute    Hospitalized 6/3/2014 for acute right sided weakness and slurred speech  DCS - Ayla Dela Cruz is a 69 y.o. female admitted to the services of hospital medicine via the ER for acute CVA. Presented with difficulty speaking, facial drooping and weakness of the right upper and lower extremities. She missed the time window for tPA. ST/PT/OT as well as cardiology and neurology were consulted. Dr. Jurado of cardiology managed A fib. Patient rapidly improved functioning with PT/OT/ST. Currently her goals with PT are met as she is ambulating over 400 feet independently.  She was not approved for IP rehab and refuses SNF. She will be discharged home with outpatient PT/ST/OT evaluation and treatment.    Neurocardio work up  CT head - Mild involutional changes and findings suggesting mild microvascular ischemic change with no acute intracranial findings    Carotid US - No hemodynamically significant stenosis is noted by velocity criteria involving either internal carotid artery.  A hemodynamically significant stenosis is defined as a stenosis of greater than 50% of the internal carotid artery vessel  lumen    ECHO, 6/4/2014, CONCLUSIONS     1 - Concentric hypertrophy. Wall thickness is mildly increased, with the septum and the posterior wall each measuring 1.1 cm across.    2 - Normal left ventricular systolic function (EF 60-65%).     3 - Mild mitral regurgitation.     4 - Left ventricular diastolic dysfunction.     5 - Pulmonary hypertension. estimated PA systolic pressure is 64 mmHg.    6 - Normal right ventricular systolic function .     7 - Suggestion for increase in LV mass from echo on 3/18/2014..     Echo bubble study also negative. Had episode of PAF during monitoring and convert with restart of Flecainide.   Since DC, improving strength in the right hand, right leg feels normal but tire easier. Speech improving. To receive PT/OT/speech today.  Medications reviewed - will DC ASA for now, and know the effects of the Limbitrol and Ativan, uses prn rarely. Some loss of appetite and taste.    Active problems in hospital  Acute left hemispheric CVA, MRI suggest multiple areas, was not on OAC nor antiplatelet Rx  PAF with high CHADS-VAS score, post ablations and DCCs  HTN with LVH  Interstitial lung disease  Pulmonary hypertension  Hyperlipidemia was not on statin    Since visit of 6/20, problem with peripheral swelling, now taking Lasix daily. Last hospitalization / CMP, 6/3 showed K+ 3.4 with normal renal function. Also noted AVILA along with exertional chest heaviness, on-and-off over past several years. Noted it with walking and have to rest. Can last up to an hour. Max grade 10/10, yesterday during the day.  in hospital. Also quite anxious, stopped Limbitrol due to side effects, managed by Dr. Olivo. Quite sedentary and major decrease in appetite, due to depression. No Psychiatrist. Home BP high 175/97. ECG shows NSR, rate 61, right axis, nonspecific T waves abnormalities, prolong QTc 483 msec. Low anterior forces.    Holter, 6/30/2014:  PREDOMINANT RHYTHM  1. Sinus rhythm with heart rates varying  "between 43 and 67 bpm with an average of 55 bpm.     VENTRICULAR ARRHYTHMIAS  1. There were frequent polymorphic PVCs totalling 1388 and averaging 40 per hour.  There was 1 couplet.    2. There were no episodes of ventricular tachycardia.    SUPRA VENTRICULAR ARRHYTHMIAS  1. There were very rare PACs totalling 47 and averaging 1 per hour.     2. There were no episodes of sustained supraventricular tachycardia.    SINUS NODE FUNCTION  1. There was no evidence of high grade SA khai block.     AV CONDUCTION  1. There was no evidence of high grade AV block.     DIARY  1. The diary was returned, but not completed    MISCELLANEOUS  1. This was a tape of adequate length (34 hrs).     Since visit of 7/24, better, reviewed by Dr. Olivo and started antidepressant Lexapro. BMP still showed mild hypokalemia of 3.3, not using Lasix at this time. Still feeling fatigue, anxiety, and request refill for Nexium. Discussed need for GI review on chronic PPI. Lack of appetite.  Lexiscan, 7/30/2014, - Nuclear Quantitative Functional Analysis:   LVEF: 66 % (normal is 55 - 69)  LVED Volume: 50 ml (normal is 60 - 98)  LVES Volume: 17 ml (normal is 20 - 42)    Impression: NORMAL MYOCARDIAL PERFUSION  1. The perfusion scan is free of evidence for myocardial ischemia or injury.   2. There is a mild intensity fixed defect in the anteroseptal wall of the left ventricle, secondary to breast attenuation.   3. Resting wall motion is physiologic.   4. Resting LV function is normal.  (normal is 55 - 69)  5. The ventricular volumes are normal at rest and stress.   6. The extracardiac distribution of radioactivity is normal.     No problem with spironolactone, BP improved, home BP similar to office readings.    Since visit of 8/14, had some distress and home monitor showed HR of 40, felt "bad" and nervous to ED, note reviewed, ECG and final pulse count 57-58. Labs reviewed - normal renal function and K+ 3.8. Still taking one tab of K+ daily. Not " "using Lasix. Explained HR discrepancy may be due to VPCs. Reviewed by Ms. Markellroni and Lexapro increased to 20 mg, 2 days ago. Feeling "a lot better". Sleeping better. Still sedentary but ready to be more active. Eating better have lost additional 5 lbs since 8/2014.    Since visit of 9/5, still with speech therapy, noted progressive near syncope with SOB and irregular heart beats. Also noted some ankle swelling over the past 2 weeks. ECG shows marked bradycardia, rate 48, right axis, pulmonary pattern, 1st degree AVB. Wellbutrin 100 mg daily along with Lexapro 20 mg by MsEmy Jeny NP. BMP showed K+3.5. To use Lasix PRN    Since visit of 9/25, still with marked AVILA, only able to walk about 30' before having to stop. Bothered by increase palpitations during tapering of BB. No definite lung problem, evaluated this year. ECG shows sinus dewayne, rate 53, VPC, RBBB with suggestion of septal MI. No evidence for anemia - CBC 9/3/2014 was normal. Just started doing some activities with arm and leg exercise for the last 2 weeks, Doing some OT alternating with PT every other day.  CXR, 6/3/2014 - Lungs are clear of infiltrate and costophrenic sulci are sharp.  The cardiomediastinal silhouette appears stable.    PET scan, 4/2014 - 1.A hypermetabolic left pulmonary mass is noted with an SUV of 3.0 maximally.  A neoplastic, infectious, or granulomatous process is on the differential. Clinical correlation is suggested.  Close follow-up for resolution or biopsy would be suggested    2.  Elevated right-sided heart pressures are suspected with a large right atrium    3.  Right-sided pleural effusion    4.  Hypermetabolic thyroid gland asymmetrically on the right.  This may relate to thyroiditis, Graves' disease, or neoplastic process.  Ultrasound may be helpful.    5.  Small hypermetabolic focus in the expected location of the left ovary, a female pelvis ultrasound may be helpful for further evaluation.  This could relate to a uterine " "fibroid that has necrosed or infarcted    Biopsy of lung nodule on 5/1/2014 - negative for CA    CTA, 4/2014 - No evidence of pulmonary thromboembolism.    2.  Enlarged cardiac silhouette and secondary findings of elevated right heart pressures with diffuse mosaic lung pattern and right basilar pleural fluid suggesting cardiac decompensation/heart failure.    3. Unchanged 1 cm nodular density within the left upper lobe which previously demonstrated hypermetabolism by PET/CT and unchanged mediastinal lymphadenopathy.    4.  Subpleural nodular density within the right upper lobe is unchanged when compared with the previous study and could represent an area of remote fibrotic scarring.    5.  Heterogeneous appearance of the spleen, possibly due to the phase of contrast enhancement.  Evolving splenic infarction is not entirely excluded.    6. Suggestion of colonic wall thickening involving the descending colon.  Please correlate for symptoms of colitis.    Since visit of 10/14, rehospitalized for HF on 10/26 to 10/29/2014.  DCS - "Ayla Dela Cruz is a 69 y.o. female admitted to the services of hospital medicine via the ER for acute diastolic heart failure. C/o severe SOB and increase in leg swelling over the past two days. Under the care of Dr. Jurado. Recently was taken off metoprolol. History of paroxsymal atrial fib. Hospitalized here in June in this year for acute CVA. It was at that time, pt started Xarelto. Deficits has resolved. No history of heart failure.     Hospital Course: The patient was admitted with acute CHF biventricular and mild elevation of her troponin's at 0.029 - 0.035 and therefore an anginal equivalent was suspected. The patient also had significant hypertension upon admission and although she was not in atrial fibrillation, her EKG showed a significant first degree AV block and RBBB. Discussion was made as to whether or not to decrease the patient flecainide; however due to the risk of her going " "back into atrial fibrillation this was not done. Upon discharge the patient's lisinopril was increased to 10 mg and aldactone was added."    RHC done HEMODYNAMIC RESULTS:    LVEDP (Pre): 24 mmHg  BP: 166/104    Air rest:  PA: 90/34 (54)  PW:  (24)  RVEDP: 16  RA:  (16)    C. SUMMARY:    1. Diastolic dysfunction.  2. - Kee CO 2.64 L/min  3. - Mean systemic pressure 108.6 mmHG, stage II HTN  4. - SVR 41.16 Wood Units  5. - PVR 11.36 Wood Units  6. - Pulmonary HTN due to left sided heart disease and stage II HTN    D. RECOMMENDATIONS:    1. Routine post-cath care.  2. Cardiac rehab referral.  3. Follow up with Dr. Barrett Jurado.  4. - Aggressive BP lowering and diuresis    Repeat ECHO 11/5/2014 CONCLUSIONS     1 - Concentric remodeling. Septal wall thickness is increased, and posterior wall thickness is mildly increased, with the septum measuring 1.3 cm and the posterior wall measuring 1.1 cm across.    2 - Mildly to moderately depressed left ventricular systolic function (EF 40-45%).     3 - Left ventricular diastolic dysfunction. E/e'(lat) is 16    4 - Right ventricular enlargement with mildly depressed systolic function.     5 - Pulmonary hypertension. estimated PA systolic pressure is 56 mmHg.     6 - Intermediate central venous pressure.     7 - No evidence of intracardiac shunt.     8 - No significant change from Echo of 6/4/2014.    Active problems:  Progressive severe SOB, functional class IV  Diastolic dysfunction, elevated BNP and Troponin  Hypokalemia due to diuretics  Secondary Pulmonary HTN with peripheral edema  HTN  History of CVA 6/3/2014  PAF controlled with Flecanide  Marked bradycardia with BB  On OAC  Hematuria    At home receiving PT, OT and speech Rx twice a week, with  nurse once a week, no problem with medications. Feeling better, sleeping well. Home /73.    Since visit of 11/14, feeling "much better", concern of occasional HTN, home BP /66-99, HR . Lot more active, no problem. " "Not using walker, no CP, SOB, nor dizziness. Recent BMP - normal renal function and K+ 4.1.    Since visit of 12/19/2014, worry about HTN home readings similar to office up to . Morning reading is higher. No HA nor visual abnormalities. Xanax has helped but afraid to use too much. ECG shows sinus arrhythmia, rate of 65, late transition and LAFB. Also bothered with dry cough with the Lisinopril. And started to have return of arm pains that was attributed to atorvastatin, on Crestor. Discussed options.     Since visit of 1/16/2015, noted frequent nocturia, 8-10 times, taking losartan-HCT at night time. Have stopped using Lasix and spironolactone for past 2 months. More active without much problem. Labs normal renal function, K+ 4.2, BNP now 37, A1C 5.6%.    Since visit of 2/18/2015, improving, no further dizziness, some SOB when "stressed", usually helped with alprazolam, use only prn, about 3-6 times weekly. Compliant with medications. Been off Crestor for 6 weeks with concern of muscle problem. Home BP is fine, similar to office.     Since visit of 4/15, appetite returned, feeling a lot better. Active, walking twice a week for about half an hour. Also do calisthenic about twice a week, no problem. Seeing Dr. Zavaleta for generalized pruritis for past 3 months. Cardiac wise less AVILA. Sleeping well. Labs in 5/2015, normal CBC without eosinophilia, thyroid normal, ferritin level low normal at 21. Off Crestor for couple months due to fear of muscle pain but did not find much difference being off medication. Last Lipid in 11/2014 was good, on daily dose of Crestor. Home /79.    Since visit of 7/2/2015, feeling "great", very active without problem, no more AVILA nor dizziness with standing. Compliant with medications, don't miss. Using Tylenol 500 mg BID for arthritis. Notice easy bruising on Xarelto. Home /10.  Holter - PREDOMINANT RHYTHM  1. Sinus arrhythmia with heart rates varying between 46 and 110 bpm " with an average of 73 bpm.     VENTRICULAR ARRHYTHMIAS  1. There were very rare PVCs recorded totalling 8 and averaging less than 1 per hour.     2. There were no episodes of ventricular tachycardia.    SUPRA VENTRICULAR ARRHYTHMIAS  1. There were very frequent PACs totalling 3054 and averaging 132 per hour.  There were 29 bigeminal cycles.  There were 103 couplets.    2. 3% of complexes.    3. There were no episodes of sustained supraventricular tachycardia.    SINUS NODE FUNCTION  1. There was no evidence of high grade SA khai block.     AV CONDUCTION  1. There was no evidence of high grade AV block.     2. The longest RR interval was 1565 msec.     DIARY  1. The diary was properly completed.     2. There was 1 episode of skipping beats reported. The corresponding rhythm strips revealed the following:             During event 1 the rhythm was sinus rhythm at 75 bpm.     3. There was 1 episode of skipped beat reported. The corresponding rhythm strips revealed the following:             During event 1 the rhythm was sinus arrhythmia at 63 bpm.     MISCELLANEOUS  1. This was a tape of adequate length (23 hrs).     Since visit of 10/15, place on new antidepressant, Venlafaxine 75 mg daily, tried 2 and developed rapid HR to 125 bpm, feels more anxious, now switched to Citalopram. Also noted the daily dose of Lorazepam does not seem adequate. Orthostatic VS is positive with lying 142/73, , standing 120/62, . Been taking spironolactone 25 mg for last 3 days. Does feel thirsty. Labs reviewed A1C 5.8%, CBC, BMP were WNL. Lipid shows LDL 99, non-. Goal preferred is <70 and < 100. No problem with 10 mg of Crestor. Had vacation at [place] [employer], walked all over without problem.    Since visit of 1/18/2016, no new problem. 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 Labs with , non-, hsCRP at 2.56.     In 10/2016, had acute GB attack with rupture in 8/2016, then tried on  "Wellbutrin took only 1-2 tabs and BP up to 201/100 with head tightness. Subsequently back to normal, the last 2.5 days took Losartan bid. Discussed Rx options for HTN. Advised to be more active, regular exercise. Lab reviewed LDL in 8/2016 93.     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 Still with daily palpitations, last up to half hours. Have electronic watch, David Barroso, since 9/2016, suppose harmonize patient with the universe. Feeling better if on during the day. Lot of walking at school, no problem.   Holter in 10/2016 - PREDOMINANT RHYTHM  1. Sinus rhythm with heart rates varying between 52 and 144 bpm with an average of 75 bpm.     2. Paroxysmal atrial fibrillation    VENTRICULAR ARRHYTHMIAS  1. There were occasional mostly monomorphic PVCs totalling 596 and averaging 13 per hour.     2. There were no episodes of ventricular tachycardia.    SUPRA VENTRICULAR ARRHYTHMIAS  1. There were frequent PACs totalling 2982 and averaging 69 per hour.  There were 69 bigeminal cycles.  There were 55 couplets.    2. There were no episodes of sustained supraventricular tachycardia.    3. Paroxysmal atrial fibrillation was noted in the the study.     SINUS NODE FUNCTION  1. There was no evidence of high grade SA khai block.     AV CONDUCTION  1. There was no evidence of high grade AV khai filtering.     2. The longest RR interval was 1725 msec.     DIARY  1. The diary was returned, but not completed    MISCELLANEOUS  1. This was a tape of adequate length (43 hrs).     ECHO CONCLUSIONS     1 - Hyperdynamic left ventricular systolic function (EF 65-70%).     2 - Normal left ventricular diastolic function.     3 - Normal right ventricular systolic function .     4 - Pulmonary hypertension. The estimated PA systolic pressure is 64 mmHg.     5 - Less evidence for LVH from Echo in 11/2014.     In 5/2017, bothered by recurrent PAF with AVILA after 10 feet walking. Felt better before on Flecainide 100 mg bid, but " "Holter continued to show PAF. Attempt up titration, problem with itching, switched to propafenone 150 mg tid, continued with itching and reviewed by allergist, treated with 10 days of cortisone with benefit. No hive and no itching, just don't feel good due to the irregular rhythm. History reviewed, had 2 prior AF ablation, last in [place], [place] in around 2000, recall being told not to do again. Recall seeing an [facility] EP doc but didn't like him. Discussed various options. Will stop antiarrhythmic for now, will be going on a 16 days trip to [place]. Reviewed prior medications, have taken Tenormin, metoprolol tartrate, and short-acting diltiazem in the past without problem. Refer to Dr. Calderon for review. ECG in AF, rate 99, PRWP, LAFB    In 11/2017, post AF ablation, felt good for a few weeks, noted some SOB and had review with Dr. Calderon on 11/1 - 72 year old female with a longstanding history of atrial arrhythmias. Her [id number] Score is 5 (age, female, TIA, HTN) so she should remain on anticoagulation indefinitely. She has failed Flecainide. She is now 6 weeks post-PVI and MA flutter line, and maintaining sinus rhythm on low-dose Amiodarone. Given her intermittent AVILA which started post-ablation, I have stopped Amiodarone which I think is the likely culprit. I instructed her to continue taking Lasix PRN, as well as metoprolol and Xarelto." ECG on 11/1 was in NSR, rate 61, PRWP.  Recommended to stop amiodarone but then started to feel the palpitation daily, felt nervous and at the same time being taper down on the nightly Ativan. Did have some breakthrough anxiety attacks. Also had strained the upper back and right arm / shoulder, requesting some Tramadol, tried Jan's Tramadol with good relief. Understand this is an opoid. Used Excedrin with caffeine and bothered by more palpitations.    In 3/2018, here for recurrent palpitations, no inciting factors over the past 6 weeks. Had review with Dr. Calderon in 2/2018 " "- "72 year old female with a longstanding history of atrial arrhythmias and prior ablations at outside institutions. Her [id number] Score is 5 (age, female, TIA, HTN) so she should remain on anticoagulation indefinitely. She is now 5 months post-PVI and MA flutter line, and maintaining sinus rhythm off Amiodarone. The plan is to continue Xarelto, and to see me again in 6 months." Palpitations appears associated with AVILA, had difficulties walking in house or up a ramp. Started 100 mg of amiodarone last week, daily, feels some benefit. ECG today in Sinus rhythm vs wandering atrial pacemaker with frequent PJC, rate 59. Also taking Losartan in the morning appears more effective.  Holter 11/2017 - PREDOMINANT RHYTHM  1. Sinus arrhythmia with heart rates varying between 39 and 102 bpm with an average of 58 bpm.     VENTRICULAR ARRHYTHMIAS  1. There were occasional PVCs totalling 728 and averaging 15 per hour.  There were 5 bigeminal cycles.     2. There were no episodes of ventricular tachycardia.    SUPRA VENTRICULAR ARRHYTHMIAS  1. There was no supraventricular ectopic activity.    SINUS NODE FUNCTION  1. There was no evidence of high grade SA khai block.     AV CONDUCTION  1. There was no evidence of high grade AV block.     2. The longest RR interval was 1820 msec.     DIARY  1. The diary was not returned    MISCELLANEOUS  1. There were occasional hookup related artifacts. Overall, the study was of good quality.     2. This was a tape of adequate length (48 hrs).     HPI comments: in 5/2018, no new problem, still concern of AVILA, usually with walking, variable as little 10 feet or fine with some long walk, can play flute for an hour but find difficulties in holding a note. Off daily amiodarone, uses it prn for palpitation, find helpful. Labs reviewed from 1/2018, TSH, Vitamin D, LDL 68.2, A1C 5.9%, CMP - normal renal function, K+ 3.6. To go to [place] for a month in 8/2018.    ROS    Constitutional: negative for " "chills, fatigue, fevers, sweats, no further slurred speech, and no dysarthria, appetite improved, intolerance to heat, bruises easily on NOAC, weight up 4 lbs since 3/2018.  Eyes: negative for icterus, redness and visual disturbance, have visual halo, had a bleed in the right Iris  Respiratory: negative for asthma, cough have resolved off ACE-I, have dyspnea on exertion, occasional SOB with HR 85 to 100 bpm, have lifelong snoring, [place] score 7 improved down to 3, no hemoptysis, pleurisy/chest pain and wheezing  Cardiovascular: negative for chest pain, chest pressure/discomfort, no further orthostatic dizziness, still with palpitations, no paroxysmal nocturnal dyspnea and syncope  Gastrointestinal: negative for abdominal pain, nausea and vomiting, have GERD  Musculoskeletal:positive for arthralgias, and less right sided muscle weakness with improvement in leg strength, improved bilateral ankle pains - OA and no myalgias  Neurological: negative for coordination problems, dizziness, gait problems, headaches, paresthesia, have some tremors but able to draw and no vertigo  Psych: positive for depression, nervousness, anxiety, less stressed due to 's illness have improved    Objective:    Physical Exam    General appearance: alert, appears stated age, cooperative and no distress, decrease skin turgor.  Head: Normocephalic, without obvious abnormality, atraumatic  Eyes:  conjunctivae/corneas clear. PERRL, EOM's intact.    Neck: no adenopathy, no carotid bruit, no JVD, supple, symmetrical, trachea midline and thyroid not enlarged, symmetric, no tenderness/mass/nodules  Lungs:  clear to auscultation bilaterally  Chest wall: no tenderness  Heart: regular rate and rhythm, normal S1, S2 normal, occasional grade 2/6 systolic murmur, click, rub or gallop    Abdomen: soft, non-tender; bowel sounds normal; no masses,  no organomegaly, waist 31" hip 42"  Extremities: extremities no further weakness of the right upper " extremity, atraumatic, no cyanosis and have no edema, minor varicose veins  Pulses: 2+ and symmetric    Assessment:       1. AVILA (dyspnea on exertion)    2. Palpitations    3. Paroxysmal atrial fibrillation, ablations X 2 1995, 1997, CHADS-VAS score 5, HAS-BLED score 5     4. Essential hypertension    5. Pure hypercholesterolemia    6. H/O: CVA (cerebrovascular accident), June 2014    7. Hypertensive left ventricular hypertrophy, without heart failure    8. BMI 24.8 decreased to 21.1    9. Long term current use of amiodarone, started 9/2017, using prn 1/2018    10. Chronic diastolic heart failure, 2014    11. Microalbuminuria    12. Other specified transient cerebral ischemias    13. S/P ablation of atrial fibrillation    14. S/P ablation of atrial flutter         Plan:       Ayla was seen today for follow-up.    Diagnoses and associated orders for this visit:    AVILA (dyspnea on exertion)  -     spironolactone (ALDACTONE) 25 MG tablet; Take 1 tablet (25 mg total) by mouth once daily.  Dispense: 90 tablet; Refill: 3    Palpitations  -     EKG 12-lead    Paroxysmal atrial fibrillation, ablations X 2 1995, 1997, CHADS-VAS score 5, HAS-BLED score 5     Essential hypertension    Pure hypercholesterolemia    H/O: CVA (cerebrovascular accident), June 2014    Hypertensive left ventricular hypertrophy, without heart failure  -     spironolactone (ALDACTONE) 25 MG tablet; Take 1 tablet (25 mg total) by mouth once daily.  Dispense: 90 tablet; Refill: 3  -     Basic metabolic panel; Future; Expected date: 06/25/2018  -     Basic metabolic panel; Future; Expected date: 06/01/2018    BMI 24.8 decreased to 21.1    Long term current use of amiodarone, started 9/2017, using prn 1/2018    Chronic diastolic heart failure, 2014  -     spironolactone (ALDACTONE) 25 MG tablet; Take 1 tablet (25 mg total) by mouth once daily.  Dispense: 90 tablet; Refill: 3    Microalbuminuria  -     spironolactone (ALDACTONE) 25 MG tablet; Take 1  tablet (25 mg total) by mouth once daily.  Dispense: 90 tablet; Refill: 3  -     Microalbumin/creatinine urine ratio; Future; Expected date: 06/25/2018    Other specified transient cerebral ischemias    S/P ablation of atrial fibrillation    S/P ablation of atrial flutter      - All medical issues reviewed, continue current Rx.  - CV status stable, back on antiarrhythmic, all medications reviewed, patient acknowledge good understanding.  - Recommend using Tylenol as first line pain medications.  - Recommend at least biannual cardiovascular evaluation in view of her significant risk factors  - Highly recommend 30 minutes of exercise daily, can have Sunday off, with 2-3 sessions of muscle strengthening weekly. A  would be very helpful.    Chronic pruritus, onset 3/2015 - resolved off Xanax     Depression    Stress at home - improved  - Consider Yoga, Gilberto Chi, or meditation    Elevated brain natriuretic peptide (BNP) level, range 98 - 1045 - improved     Pulmonary hypertension, with right sided congestive heart failure, acute    Chest pain on exertion - resolved    Peripheral edema - imporved    Statin intolerance with Lipitor    Anxiety - imporved    Patient Active Problem List   Diagnosis    Paroxysmal atrial fibrillation, ablations X 2 1995, 1997, CHADS-VAS score 5, HAS-BLED score 5     Hypertension, 1985    Hyperlipidemia, baseline     GERD (gastroesophageal reflux disease)    Glaucoma (increased eye pressure)    Anxiety    Fibroid uterus    Thickened endometrium    Abnormal CT scan, chest    Interstitial lung disease    H/O: CVA (cerebrovascular accident), June 2014    LVH (left ventricular hypertrophy) due to hypertensive disease    Cardiomegaly    BMI 24.8 decreased to 21.1    Long term current use of amiodarone, started 9/2017, using prn 1/2018    Depression    AVILA (dyspnea on exertion)    VPC (ventricular premature complex)    OA (osteoarthritis)    Chronic diastolic  heart failure, 2014    Elevated brain natriuretic peptide (BNP) level, range 98 - 1045    Microalbuminuria    Hypoalbuminemia    TIA (transient ischemic attack), 11/3/2014    Statin intolerance    Chronic pruritus, onset 3/2015    Hypotension due to drugs    Sepsis    Acute delirium    Cancer screening    Reflux esophagitis    Colon cancer screening    Atrial flutter    S/P ablation of atrial fibrillation    S/P ablation of atrial flutter    JOSE (generalized anxiety disorder)    Benzodiazepine withdrawal with complication    Caffeine adverse reaction    Muscle strain    Cervical radiculitis    Other spondylosis, lumbar region    Premature junctional contractions    Heart palpitations     Total face-to-face time with the patient was 35 minutes and greater than 50% was spent in counseling and coordination of care. The above assessment and plan have been discussed at length. Labs and procedure over the last 6 months reviewed. Problem List updated. Asked to bring in all active medications / pills bottles with next visit.

## 2018-05-29 ENCOUNTER — TELEPHONE (OUTPATIENT)
Dept: PAIN MEDICINE | Facility: CLINIC | Age: 73
End: 2018-05-29

## 2018-05-29 RX ORDER — GABAPENTIN 300 MG/1
CAPSULE ORAL
Qty: 30 CAPSULE | Refills: 2 | Status: SHIPPED | OUTPATIENT
Start: 2018-05-29 | End: 2018-08-20 | Stop reason: SDUPTHER

## 2018-05-29 RX ORDER — GABAPENTIN 100 MG/1
100 CAPSULE ORAL NIGHTLY
COMMUNITY
End: 2018-10-21

## 2018-05-29 NOTE — TELEPHONE ENCOUNTER
----- Message from Ivon Diaz sent at 5/29/2018  2:20 PM CDT -----  Pt is requesting a prescription or gabapentin 100 mgs for day use ..call pt at [phone] (home)     [facility]/pharmacy #[id number] - [place] - [address]  [address] [address]  Phone: [phone] Fax: [phone]

## 2018-05-30 RX ORDER — RIVAROXABAN 20 MG/1
TABLET, FILM COATED ORAL
Qty: 30 TABLET | Refills: 11 | Status: SHIPPED | OUTPATIENT
Start: 2018-05-30 | End: 2019-03-01 | Stop reason: ALTCHOICE

## 2018-05-30 RX ORDER — GABAPENTIN 100 MG/1
100 CAPSULE ORAL DAILY
Qty: 90 CAPSULE | Refills: 2 | Status: SHIPPED | OUTPATIENT
Start: 2018-05-30 | End: 2018-08-28

## 2018-06-01 ENCOUNTER — LAB VISIT (OUTPATIENT)
Dept: LAB | Facility: HOSPITAL | Age: 73
End: 2018-06-01
Attending: INTERNAL MEDICINE
Payer: MEDICARE

## 2018-06-01 DIAGNOSIS — I11.9 HYPERTENSIVE LEFT VENTRICULAR HYPERTROPHY, WITHOUT HEART FAILURE: ICD-10-CM

## 2018-06-01 LAB
ANION GAP SERPL CALC-SCNC: 11 MMOL/L
BUN SERPL-MCNC: 14 MG/DL
CALCIUM SERPL-MCNC: 10 MG/DL
CHLORIDE SERPL-SCNC: 108 MMOL/L
CO2 SERPL-SCNC: 24 MMOL/L
CREAT SERPL-MCNC: 0.9 MG/DL
EST. GFR  (AFRICAN AMERICAN): >60 ML/MIN/1.73 M^2
EST. GFR  (NON AFRICAN AMERICAN): >60 ML/MIN/1.73 M^2
GLUCOSE SERPL-MCNC: 99 MG/DL
POTASSIUM SERPL-SCNC: 4.2 MMOL/L
SODIUM SERPL-SCNC: 143 MMOL/L

## 2018-06-01 PROCEDURE — 36415 COLL VENOUS BLD VENIPUNCTURE: CPT | Mod: PO

## 2018-06-01 PROCEDURE — 80048 BASIC METABOLIC PNL TOTAL CA: CPT

## 2018-06-07 ENCOUNTER — HOSPITAL ENCOUNTER (OUTPATIENT)
Facility: AMBULARY SURGERY CENTER | Age: 73
Discharge: HOME | End: 2018-06-07
Attending: ANESTHESIOLOGY | Admitting: ANESTHESIOLOGY
Payer: MEDICARE

## 2018-06-07 ENCOUNTER — SURGERY (OUTPATIENT)
Age: 73
End: 2018-06-07

## 2018-06-07 DIAGNOSIS — M47.812 CERVICAL SPONDYLOSIS: ICD-10-CM

## 2018-06-07 DIAGNOSIS — M47.892 OTHER SPONDYLOSIS, CERVICAL REGION: Primary | ICD-10-CM

## 2018-06-07 PROCEDURE — G8907 PT DOC NO EVENTS ON DISCHARG: HCPCS | Performed by: ANESTHESIOLOGY

## 2018-06-07 PROCEDURE — 64492 INJ PARAVERT F JNT C/T 3 LEV: CPT | Performed by: ANESTHESIOLOGY

## 2018-06-07 PROCEDURE — 64490 INJ PARAVERT F JNT C/T 1 LEV: CPT | Mod: RT,,, | Performed by: ANESTHESIOLOGY

## 2018-06-07 PROCEDURE — 64490 INJ PARAVERT F JNT C/T 1 LEV: CPT | Performed by: ANESTHESIOLOGY

## 2018-06-07 PROCEDURE — 64491 INJ PARAVERT F JNT C/T 2 LEV: CPT | Mod: RT,,, | Performed by: ANESTHESIOLOGY

## 2018-06-07 PROCEDURE — 64491 INJ PARAVERT F JNT C/T 2 LEV: CPT | Performed by: ANESTHESIOLOGY

## 2018-06-07 PROCEDURE — 99152 MOD SED SAME PHYS/QHP 5/>YRS: CPT | Mod: ,,, | Performed by: ANESTHESIOLOGY

## 2018-06-07 RX ORDER — MIDAZOLAM HYDROCHLORIDE 2 MG/2ML
INJECTION, SOLUTION INTRAMUSCULAR; INTRAVENOUS
Status: DISCONTINUED | OUTPATIENT
Start: 2018-06-07 | End: 2018-06-07 | Stop reason: HOSPADM

## 2018-06-07 RX ORDER — LIDOCAINE HYDROCHLORIDE 10 MG/ML
INJECTION, SOLUTION EPIDURAL; INFILTRATION; INTRACAUDAL; PERINEURAL
Status: DISCONTINUED | OUTPATIENT
Start: 2018-06-07 | End: 2018-06-07 | Stop reason: HOSPADM

## 2018-06-07 RX ORDER — SODIUM CHLORIDE, SODIUM LACTATE, POTASSIUM CHLORIDE, CALCIUM CHLORIDE 600; 310; 30; 20 MG/100ML; MG/100ML; MG/100ML; MG/100ML
INJECTION, SOLUTION INTRAVENOUS ONCE AS NEEDED
Status: COMPLETED | OUTPATIENT
Start: 2018-06-07 | End: 2018-06-07

## 2018-06-07 RX ORDER — BUPIVACAINE HYDROCHLORIDE 5 MG/ML
INJECTION, SOLUTION EPIDURAL; INTRACAUDAL
Status: DISCONTINUED | OUTPATIENT
Start: 2018-06-07 | End: 2018-06-07 | Stop reason: HOSPADM

## 2018-06-07 RX ORDER — MIDAZOLAM HYDROCHLORIDE 1 MG/ML
INJECTION INTRAMUSCULAR; INTRAVENOUS
Status: DISCONTINUED
Start: 2018-06-07 | End: 2018-06-07 | Stop reason: HOSPADM

## 2018-06-07 RX ADMIN — SODIUM CHLORIDE, SODIUM LACTATE, POTASSIUM CHLORIDE, CALCIUM CHLORIDE: 600; 310; 30; 20 INJECTION, SOLUTION INTRAVENOUS at 09:06

## 2018-06-07 RX ADMIN — BUPIVACAINE HYDROCHLORIDE 5 ML: 5 INJECTION, SOLUTION EPIDURAL; INTRACAUDAL at 10:06

## 2018-06-07 RX ADMIN — MIDAZOLAM HYDROCHLORIDE 2 MG: 2 INJECTION, SOLUTION INTRAMUSCULAR; INTRAVENOUS at 10:06

## 2018-06-07 RX ADMIN — LIDOCAINE HYDROCHLORIDE 5 ML: 10 INJECTION, SOLUTION EPIDURAL; INFILTRATION; INTRACAUDAL; PERINEURAL at 10:06

## 2018-06-07 NOTE — H&P (VIEW-ONLY)
Referring Physician: No ref. provider found    PCP: Jan Olivo MD    CC:  R shoulder and neck pain    Interval History:  Mrs. Dela Cruz is a 73 y.o. female with neck pain who presents for f/u s/p Cervical CHRISTOPHER at C7-T1. The pt had a C7-T1 CHRISTOPHER performing 4/10/18. The pt states she experienced 100% relief for 3 weeks. She presents today as a drop in to clinic with concern that her neck and right shoulder pain is currently an 8/10 pain again. No upper extremity sensory or motor deficits. The pt has made no recent changes to her medication regimen. No recent trauma or stimulus for symptom presentation. She is planning on traveling to [place] for 1 month in August. She presents today to see if there are alternative medication regimens/procedures we can offer to improve her persistent pain symptoms.     Prior HPI:   Ayla Dela Cruz is a 73 y.o. female who presents as a new patient from Dr. Grubbs for 2.5 month history of R shoulder/mid back pain. The pain first began when she lifted some books at a bookstore in early November. She began feeling the pain in her R lateral neck/suprascapular region, with radiation down the back/side of her arm to the top of the elbow. She was treated with some topical creams, chiropractor adjustments, and then was placed on gabapentin by Dr. Grubbs, which has helped her pain a considerable amount. She states that the pain is intermittent, aching, sharp, shooting & radiates to her elbow. Worsened by sitting, or lying in bed. 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     ROS:  CONSTITUTIONAL: No fevers, chills, night sweats, wt. loss, appetite changes  SKIN: no rashes or itching  ENT: No headaches, head trauma, vision changes, or eye pain  LYMPH NODES: None noticed   CV: No chest pain, palpitations.   RESP: No shortness of breath, dyspnea on exertion, cough, wheezing, or hemoptysis  GI: No nausea, emesis, diarrhea, constipation, melena, hematochezia, pain.    : No dysuria,  hematuria, urgency, or frequency   HEME: No easy bruising, bleeding problems  PSYCHIATRIC: No anxiety, psychosis, hallucinations. +ve depression  NEURO: No seizures, memory loss, dizziness, +ve difficulty sleeping  MSK: +HPI      Past Medical History:   Diagnosis Date    Anticoagulant long-term use     Anxiety     Arthritis     Atrial fibrillation     Cancer     skin    CHF (congestive heart failure)     Depression     DVT (deep venous thrombosis)     GERD (gastroesophageal reflux disease)     Glaucoma (increased eye pressure)     Hyperlipidemia     diet controlled    Hypertension     Interstitial lung disease     Pneumonia 1/31/2014    Stroke 6-3-14    Stroke     TIA (transient ischemic attack)     TIA (transient ischemic attack)      Past Surgical History:   Procedure Laterality Date    2 heart ablations      3 in total    bilateral cataracts      CHOLECYSTECTOMY      COLONOSCOPY N/A 1/19/2017    Procedure: COLONOSCOPY and EGD;  Surgeon: Jonathan Schultz MD;  Location: [facility];  Service: Endoscopy;  Laterality: N/A;    pyloristenosis      skin cancer removal       TONSILLECTOMY       Family History   Problem Relation Age of Onset    Heart disease Father     Ulcers Father     Arthritis Mother     Asthma Mother     Rheum arthritis Mother     Pneumonia Mother     Depression Son     Alzheimer's disease Maternal Uncle     Rheum arthritis Maternal Grandmother     Emphysema Maternal Grandfather     Cancer Maternal Grandfather         kidney    Kidney disease Maternal Grandfather     Cancer Paternal Grandmother         lung= smoker    Pneumonia Paternal Grandfather     Breast cancer Neg Hx     Ovarian cancer Neg Hx      Social History     Social History    Marital status:      Spouse name: N/A    Number of children: N/A    Years of education: N/A     Social History Main Topics    Smoking status: Never Smoker    Smokeless tobacco: Never Used    Alcohol use No    Drug  "use: No    Sexual activity: Yes     Partners: Male     Other Topics Concern    None     Social History Narrative    None         Medications/Allergies: See med card    Vitals:    05/22/18 0918   BP: (!) 161/82   Pulse: (!) 53   Weight: 59 kg (130 lb)   Height: 5' 7" (1.702 m)   PainSc:   8   PainLoc: Neck         Physical exam:    GENERAL: A and O x3, the patient appears well groomed and is in no acute distress.  Skin: No rashes or obvious lesions  HEENT: normocephalic, atraumatic  CARDIOVASCULAR:  bradycardia  LUNGS: non labored breathing  ABDOMEN: soft, nontender   UPPER EXTREMITIES: Normal alignment, normal range of motion, no atrophy, no skin changes,  hair growth and nail growth normal and equal bilaterally. No swelling, no tenderness.    LOWER EXTREMITIES:  Normal alignment, normal range of motion, no atrophy, no skin changes,  hair growth and nail growth normal and equal bilaterally. No swelling, no tenderness.  CERVICAL SPINE:  Cervical spine: ROM is full in flexion, extension and lateral rotation with increased pain on the right side with lateral rotation and extension.   Spurling's maneuver causes no neck pain to either side.  Myofascial exam: tender to palpation along suprascapular muscle and anterior pressure of shoulder.  MENTAL STATUS: normal orientation, speech, language, and fund of knowledge for social situation.  Emotional state appropriate.       CRANIAL NERVES:  II:  PERRL bilaterally,   III,IV,VI: EOMI.    V:  Facial sensation equal bilaterally  VII:  Facial motor function normal.  VIII:  Hearing equal to finger rub bilaterally  IX/X: Gag normal, palate symmetric  XI:  Shoulder shrug equal, head turn equal  XII:  Tongue midline without fasciculations      MOTOR: Tone and bulk: normal bilateral upper and lower Strength: normal   Delt Bi Tri WE WF     R 5 5 5 5 5 5   L 5 5 5 5 5 5     IP ADD ABD Quad TA Gas HAM  R 5 5 5 5 5 5 5  L 5 5 5 5 5 5 5    SENSATION: Light touch and pinprick intact " bilaterally  REFLEXES: normal, symmetric, nonbrisk.  Toes down, no clonus. No hoffmans.  GAIT: normal rise, base, steps, and arm swing.        Imaging:  MRI C-spine 12/2017  There is a 2 mm retrolisthesis of C4 on C5 and C6 on C7.  There is reversal of the normal cervical lordosis which could relate to neck muscle spasm.The cervical vertebral bodies show normal signal intensity and height with no indication of acute fracture or pathologic marrow replacement process.    There is degenerative disc desiccation at every cervical level with marginal osteophyte formation and disc space narrowing noted at C3-C4, C4-C5, C5-C6, and to a lesser extent, C6-C7.  There is congenital spinal canal narrowing present.    The cervical cord is normal in signal intensity at all levels with no indication of myelomalacia or cord edema. The incidentally observed soft tissues of the neck show no significant abnormalities.    C2-C3: There is bilateral hypertrophic facet arthropathy.  There is left ligamentum flavum thickening.  No definite acquired central canal or neuroforaminal stenosis.    C3-C4: There is a posterior disc osteophyte complex with a superimposed broad central disc protrusion which flattens the ventral cord.  There is ligamentum flavum thickening, bilateral uncovertebral spurring, and bilateral facet arthropathy, left greater than right.  There is moderate overall central canal stenosis, moderate-severe left neuroforaminal stenosis, and moderate right neuroforaminal stenosis.    C4-C5: There is a bulging posterior disc osteophyte complex, ligamentum flavum thickening, bilateral uncovertebral spurring, and facet arthropathy, right greater than left.  There is severe bilateral neural foraminal stenosis and moderate overall central canal stenosis.  No abnormal cord signal.  There is flattening of the ventral cord.    C5-C6: There is a posterior disc osteophyte complex and bilateral uncovertebral spurring, right greater than  left.  No left neuroforaminal stenosis.  There is mild-moderate right neuroforaminal stenosis and mild-moderate overall central canal stenosis.    C6-C7: There is a 2 mm retrolisthesis of C6 on C7 with uncovering of the intervertebral disc and a superimposed posterior disc osteophyte complex.  There is bilateral uncovertebral spurring, left greater than right.  There is severe left and moderate right neuroforaminal stenosis.  There is ligamentum flavum thickening.  There is moderate central canal stenosis with flattening of the ventral cord.    C7-T1: No central canal or neuroforaminal stenosis. No disc protrusion or extrusion.    Assessment:  Mrs. Dela Cruz is a 72 y.o. female with chronic neck and shoulder pain.     1. DDD (degenerative disc disease), cervical    2. Other spondylosis, cervical region          Plan:  - Recommend physical therapy and home exercise regimen to improve strength   - Will increase gabapentin dosing to 600 mg daily from 300 mg daily as pt currently describes this medication providing pain relief and she is currently taking as prescribed.   - Schedule for Right cervical MBB at C4, C5, and C6. If pt experiences significant relief with this diagnostic procedure we will schedule for RFA.   - Offered pt repeat CHRISTOPHER with understanding that she can only have this procedure 4 times per year. The pt would prefer to postpone this intervention closer to her scheduled trip in August to [place] for 1 month.   - RTC following procedure

## 2018-06-07 NOTE — OP NOTE
DATE: 6/7/2018    PROCEDURE:  Cervical medial branch block of the C4,5,6 medial branch nerves on the right-side utilizing fluoroscopy    DIAGNOSIS:  Other cervical spondylosis    PHYSICIAN: Galo Clancy MD    MEDICATIONS INJECTED:  0.5% bupivicaine, 0.5ml at each level.    LOCAL ANESTHETIC USED:   1% lidocaine, 1ml at each level.    SEDATION MEDICATIONS: IV Versed    ESTIMATED BLOOD LOSS:  None    COMPLICATIONS:  None    TECHNIQUE : A time-out was taken to identify patient and procedure side prior to starting the procedure.  The patient was positioned in the prone position. The patient was prepped and draped in the usual sterile fashion using ChloraPrep and sterile towels.  The level was determined under fluoroscopic guidance using a slightly posteriorly oblique view.   Local anesthetic was infiltrated superficially at the skin level.  Then, a 25 gauge 3.5 inch needle was inserted to the anatomic location of the midsection of the lateral masses. A cross table view was then taken to ensure that needles did not cross into neural foramina.  The above noted medication was then injected. The patient tolerated the procedure well.     The patient was monitored after the procedure. Patient given a pain diary to document progress after procedure.  If found to have greater than a 50% recovery and so will be scheduled for a radiofrequency ablation of the corresponding nerves.     The patient was given post procedure and discharge instructions to follow at home. The patient was discharged in a stable condition

## 2018-06-07 NOTE — DISCHARGE INSTRUCTIONS
Recovery After Procedural Sedation (Adult)  You have been given medicine by vein to make you sleep during your surgery. This may have included both a pain medicine and sleeping medicine. Most of the effects have worn off. But you may still have some drowsiness for the next 6 to 8 hours.  Home care  Follow these guidelines when you get home:  · For the next 8 hours, you should be watched by a responsible adult. This person should make sure your condition is not getting worse.  · Don't drink any alcohol for the next 24 hours.  · Don't drive, operate dangerous machinery, or make important business or personal decisions during the next 24 hours.  Note: Your healthcare provider may tell you not to take any medicine by mouth for pain or sleep in the next 4 hours. These medicines may react with the medicines you were given in the hospital. This could cause a much stronger response than usual.  Follow-up care  Follow up with your healthcare provider if you are not alert and back to your usual level of activity within 12 hours.  When to seek medical advice  Call your healthcare provider right away if any of these occur:  · Drowsiness gets worse  · Weakness or dizziness gets worse  · Repeated vomiting  · You can't be awakened   Date Last Reviewed: 10/18/2016  © 0246-8260 [employer]. [address] [place] [address], [place] [address]. All rights reserved. This information is not intended as a substitute for professional medical care. Always follow your healthcare professional's instructions.      Nerve Block  This was a test, not a treatment. Your pain may return.  Keep your pain journal Dr office will call to check your pain levels within 3 days   Perform activities that normally cause you pain during this testing period    Home care instructions  You may apply ice pack to the injection site for 2 days , NO HEAT for 2 days  You may take a shower but no soaking above level of injection in tub or pool for 2 days  Resume  Aspirin, Plavix, or Coumadin the day after the procedure unless otherwise instructed.  Do not drive for 24 hr      SEEK  IMMEDIATE MEDICAL HELP FOR:  Severe increase in your usual pain or appearance of new pain  Prolonged  ( more than 8 hours)or increasing weakness or numbness in the legs or arms  Drainage, redness, active bleeding, or increased swelling at the injection site  Temperature over 100.0 degrees F.  Headache that increases when your head is upright and decreases when you lie flat  Shortness of breath, chest pain, or problems breathing

## 2018-06-07 NOTE — DISCHARGE SUMMARY
[facility]  Discharge Note  Short Stay    Admit Date: 6/7/2018    Discharge Date and Time: 6/7/2018    Attending Physician: Galo Clancy MD     Discharge Provider: Galo Clancy    Diagnoses:  Active Hospital Problems    Diagnosis  POA    *Cervical spondylosis [M47.812]  Yes      Resolved Hospital Problems    Diagnosis Date Resolved POA   No resolved problems to display.       Hospital Course: Cervical MBB  Discharged Condition: Good    Final Diagnoses:   Active Hospital Problems    Diagnosis  POA    *Cervical spondylosis [M47.812]  Yes      Resolved Hospital Problems    Diagnosis Date Resolved POA   No resolved problems to display.       Disposition: Home or Self Care    Follow up/Patient Instructions:    Medications:  Reconciled Home Medications:      Medication List      CONTINUE taking these medications    amiodarone 100 MG Tab  Commonly known as:  PACERONE  Take 2 tablets (200 mg total) by mouth once daily.     ammonium lactate 12 % lotion  Commonly known as:  LAC-HYDRIN     ATROPINE SULFATE (PF) OPHT  Apply to eye.     COSOPT 22.3-6.8 mg/mL ophthalmic solution  Generic drug:  dorzolamide-timolol 2-0.5%  Place 1 drop into both eyes 2 (two) times daily. Twice a day     furosemide 20 MG tablet  Commonly known as:  LASIX  Take 20 mg by mouth once daily.     * gabapentin 100 MG capsule  Commonly known as:  NEURONTIN  Take 100 mg by mouth 3 (three) times daily.     * gabapentin 300 MG capsule  Commonly known as:  NEURONTIN  TAKE ONE CAPSULE BY MOUTH ONCE DAILY IN THE EVENING     * gabapentin 100 MG capsule  Commonly known as:  NEURONTIN  Take 1 capsule (100 mg total) by mouth once daily.     homatropine 5 % ophthalmic solution  Commonly known as:  ISOPTO HOMATROPINE  INSTILL 1 DROP IN RIGHT EYE DAILY     INV metoprolol tartrate 50 MG Tab  Commonly known as:  LOPRESSOR  Take 1 tablet (50 mg total) by mouth 2 (two) times daily.     * LORazepam 1 MG tablet  Commonly known as:  ATIVAN  TAKE 1 TABLET BY MOUTH  DAILY     * LORazepam 0.5 MG tablet  Commonly known as:  ATIVAN  Take 0.5 mg by mouth daily as needed.     losartan 100 MG tablet  Commonly known as:  COZAAR  TAKE 1 TABLET (100 MG TOTAL) BY MOUTH ONCE DAILY.     metroNIDAZOLE 0.75 % Lotn     minocycline 100 MG capsule  Commonly known as:  MINOCIN,DYNACIN  Take 100 mg by mouth 2 (two) times daily.     pantoprazole 40 MG tablet  Commonly known as:  PROTONIX  Take 1 tablet (40 mg total) by mouth once daily.     rosuvastatin 40 MG Tab  Commonly known as:  CRESTOR  TAKE 1 TABLET (40 MG TOTAL) BY MOUTH EVERY EVENING.     spironolactone 25 MG tablet  Commonly known as:  ALDACTONE  Take 1 tablet (25 mg total) by mouth once daily.     timolol maleate 0.5% 0.5 % Drop  Commonly known as:  TIMOPTIC     VIIBRYD 40 mg Tab tablet  Generic drug:  vilazodone  Take 40 mg by mouth once daily.     XARELTO 20 mg Tab  Generic drug:  rivaroxaban  TAKE 1 TABLET (20 MG TOTAL) BY MOUTH DAILY WITH DINNER OR EVENING MEAL.        * This list has 5 medication(s) that are the same as other medications prescribed for you. Read the directions carefully, and ask your doctor or other care provider to review them with you.                Discharge Procedure Orders  Call MD for:  temperature >100.4     Call MD for:  persistent nausea and vomiting or diarrhea     Call MD for:  severe uncontrolled pain     Call MD for:  redness, tenderness, or signs of infection (pain, swelling, redness, odor or green/yellow discharge around incision site)     Call MD for:  difficulty breathing or increased cough     Call MD for:  severe persistent headache          Follow up with MD in 2-3 weeks    Discharge Procedure Orders (must include Diet, Follow-up, Activity):    Discharge Procedure Orders (must include Diet, Follow-up, Activity)  Call MD for:  temperature >100.4     Call MD for:  persistent nausea and vomiting or diarrhea     Call MD for:  severe uncontrolled pain     Call MD for:  redness, tenderness, or signs  of infection (pain, swelling, redness, odor or green/yellow discharge around incision site)     Call MD for:  difficulty breathing or increased cough     Call MD for:  severe persistent headache

## 2018-06-07 NOTE — INTERVAL H&P NOTE
The patient has been examined and the H&P has been reviewed:    I concur with the findings and no changes have occurred since H&P was written.     This patient has been cleared for surgery in an ambulatory surgical facility    ASA 3,  Mallampatti Score 3  No history of anesthetic complications  Plan for RN IV sedation      Anesthesia/Surgery risks, benefits and alternative options discussed and understood by patient/family.          Active Hospital Problems    Diagnosis  POA    Cervical spondylosis [M47.812]  Yes      Resolved Hospital Problems    Diagnosis Date Resolved POA   No resolved problems to display.

## 2018-06-08 VITALS
TEMPERATURE: 98 F | OXYGEN SATURATION: 97 % | DIASTOLIC BLOOD PRESSURE: 70 MMHG | RESPIRATION RATE: 18 BRPM | BODY MASS INDEX: 21.03 KG/M2 | HEIGHT: 67 IN | SYSTOLIC BLOOD PRESSURE: 154 MMHG | HEART RATE: 58 BPM | WEIGHT: 134 LBS

## 2018-06-11 ENCOUNTER — TELEPHONE (OUTPATIENT)
Dept: PAIN MEDICINE | Facility: CLINIC | Age: 73
End: 2018-06-11

## 2018-06-19 DIAGNOSIS — M47.892 OTHER SPONDYLOSIS, CERVICAL REGION: Primary | ICD-10-CM

## 2018-06-25 ENCOUNTER — LAB VISIT (OUTPATIENT)
Dept: LAB | Facility: HOSPITAL | Age: 73
End: 2018-06-25
Attending: INTERNAL MEDICINE
Payer: MEDICARE

## 2018-06-25 DIAGNOSIS — R80.9 MICROALBUMINURIA: ICD-10-CM

## 2018-06-25 DIAGNOSIS — I48.0 PAROXYSMAL ATRIAL FIBRILLATION: ICD-10-CM

## 2018-06-25 DIAGNOSIS — R00.2 PALPITATIONS: ICD-10-CM

## 2018-06-25 DIAGNOSIS — I11.9 HYPERTENSIVE LEFT VENTRICULAR HYPERTROPHY, WITHOUT HEART FAILURE: ICD-10-CM

## 2018-06-25 LAB
ANION GAP SERPL CALC-SCNC: 9 MMOL/L
BUN SERPL-MCNC: 25 MG/DL
CALCIUM SERPL-MCNC: 10 MG/DL
CHLORIDE SERPL-SCNC: 107 MMOL/L
CO2 SERPL-SCNC: 24 MMOL/L
CREAT SERPL-MCNC: 1 MG/DL
EST. GFR  (AFRICAN AMERICAN): >60 ML/MIN/1.73 M^2
EST. GFR  (NON AFRICAN AMERICAN): 55.2 ML/MIN/1.73 M^2
GLUCOSE SERPL-MCNC: 136 MG/DL
MAGNESIUM SERPL-MCNC: 2 MG/DL
POTASSIUM SERPL-SCNC: 4 MMOL/L
SODIUM SERPL-SCNC: 140 MMOL/L

## 2018-06-25 PROCEDURE — 80048 BASIC METABOLIC PNL TOTAL CA: CPT

## 2018-06-25 PROCEDURE — 36415 COLL VENOUS BLD VENIPUNCTURE: CPT | Mod: PO

## 2018-06-25 PROCEDURE — 83735 ASSAY OF MAGNESIUM: CPT

## 2018-06-28 ENCOUNTER — TELEPHONE (OUTPATIENT)
Dept: OBSTETRICS AND GYNECOLOGY | Facility: CLINIC | Age: 73
End: 2018-06-28

## 2018-06-28 NOTE — TELEPHONE ENCOUNTER
----- Message from Rossana Walters sent at 6/28/2018  9:59 AM CDT -----  Contact: PT  Type: Needs Medical Advice    Who Called:  Ayla Dela Cruz   Symptoms (please be specific):  n/a  How long has patient had these symptoms:  n/a  Pharmacy name and phone #:  n/a  Best Call Back Number:     Additional Information:  PT is calling to reschedule her ultrasound around the same time as her mammo. Please call back and advise.

## 2018-06-28 NOTE — TELEPHONE ENCOUNTER
Contact patient and advised that there are no more available appts for US that same day on 7/2, but I am able to push mammo appt to a later time at 1:30pm.  Offered to reschedule to another day. Patient states she wants to keep same day as she has a procedure with Dr. Clancy on 7/3/18. Mammo rescheduled. No further assistance needed.

## 2018-07-02 ENCOUNTER — PATIENT MESSAGE (OUTPATIENT)
Dept: OBSTETRICS AND GYNECOLOGY | Facility: CLINIC | Age: 73
End: 2018-07-02

## 2018-07-02 ENCOUNTER — HOSPITAL ENCOUNTER (OUTPATIENT)
Dept: RADIOLOGY | Facility: HOSPITAL | Age: 73
Discharge: HOME | End: 2018-07-02
Attending: OBSTETRICS & GYNECOLOGY
Payer: MEDICARE

## 2018-07-02 DIAGNOSIS — R92.8 ABNORMAL MAMMOGRAM OF LEFT BREAST: ICD-10-CM

## 2018-07-02 PROCEDURE — 76642 ULTRASOUND BREAST LIMITED: CPT | Mod: TC,LT

## 2018-07-02 PROCEDURE — 77065 DX MAMMO INCL CAD UNI: CPT | Mod: TC,LT

## 2018-07-02 PROCEDURE — 77061 BREAST TOMOSYNTHESIS UNI: CPT | Mod: 26,,, | Performed by: RADIOLOGY

## 2018-07-02 PROCEDURE — 77061 BREAST TOMOSYNTHESIS UNI: CPT | Mod: TC,LT

## 2018-07-02 PROCEDURE — 76642 ULTRASOUND BREAST LIMITED: CPT | Mod: 26,LT,, | Performed by: RADIOLOGY

## 2018-07-02 PROCEDURE — 77065 DX MAMMO INCL CAD UNI: CPT | Mod: 26,XE,, | Performed by: RADIOLOGY

## 2018-07-03 ENCOUNTER — HOSPITAL ENCOUNTER (OUTPATIENT)
Facility: AMBULARY SURGERY CENTER | Age: 73
Discharge: HOME | End: 2018-07-03
Attending: ANESTHESIOLOGY | Admitting: ANESTHESIOLOGY
Payer: MEDICARE

## 2018-07-03 ENCOUNTER — SURGERY (OUTPATIENT)
Age: 73
End: 2018-07-03

## 2018-07-03 DIAGNOSIS — M47.812 OSTEOARTHRITIS OF CERVICAL SPINE, UNSPECIFIED SPINAL OSTEOARTHRITIS COMPLICATION STATUS: Primary | ICD-10-CM

## 2018-07-03 DIAGNOSIS — M47.892 OTHER SPONDYLOSIS, CERVICAL REGION: ICD-10-CM

## 2018-07-03 PROCEDURE — 64633 DESTROY CERV/THOR FACET JNT: CPT | Performed by: ANESTHESIOLOGY

## 2018-07-03 PROCEDURE — 64634 DESTROY C/TH FACET JNT ADDL: CPT | Performed by: ANESTHESIOLOGY

## 2018-07-03 PROCEDURE — G8907 PT DOC NO EVENTS ON DISCHARG: HCPCS | Performed by: ANESTHESIOLOGY

## 2018-07-03 PROCEDURE — 64633 DESTROY CERV/THOR FACET JNT: CPT | Mod: RT,,, | Performed by: ANESTHESIOLOGY

## 2018-07-03 PROCEDURE — 64634 DESTROY C/TH FACET JNT ADDL: CPT | Mod: RT,,, | Performed by: ANESTHESIOLOGY

## 2018-07-03 PROCEDURE — 99152 MOD SED SAME PHYS/QHP 5/>YRS: CPT | Mod: ,,, | Performed by: ANESTHESIOLOGY

## 2018-07-03 RX ORDER — LIDOCAINE HYDROCHLORIDE 10 MG/ML
INJECTION, SOLUTION EPIDURAL; INFILTRATION; INTRACAUDAL; PERINEURAL
Status: DISCONTINUED | OUTPATIENT
Start: 2018-07-03 | End: 2018-07-03 | Stop reason: HOSPADM

## 2018-07-03 RX ORDER — MIDAZOLAM HYDROCHLORIDE 2 MG/2ML
INJECTION, SOLUTION INTRAMUSCULAR; INTRAVENOUS
Status: DISCONTINUED | OUTPATIENT
Start: 2018-07-03 | End: 2018-07-03 | Stop reason: HOSPADM

## 2018-07-03 RX ORDER — BUPIVACAINE HYDROCHLORIDE 2.5 MG/ML
INJECTION, SOLUTION EPIDURAL; INFILTRATION; INTRACAUDAL
Status: DISCONTINUED
Start: 2018-07-03 | End: 2018-07-03 | Stop reason: HOSPADM

## 2018-07-03 RX ORDER — METHYLPREDNISOLONE ACETATE 80 MG/ML
INJECTION, SUSPENSION INTRA-ARTICULAR; INTRALESIONAL; INTRAMUSCULAR; SOFT TISSUE
Status: DISCONTINUED
Start: 2018-07-03 | End: 2018-07-03 | Stop reason: HOSPADM

## 2018-07-03 RX ORDER — BUPIVACAINE HYDROCHLORIDE 2.5 MG/ML
INJECTION, SOLUTION EPIDURAL; INFILTRATION; INTRACAUDAL
Status: DISCONTINUED | OUTPATIENT
Start: 2018-07-03 | End: 2018-07-03 | Stop reason: HOSPADM

## 2018-07-03 RX ORDER — LIDOCAINE HYDROCHLORIDE 20 MG/ML
INJECTION, SOLUTION EPIDURAL; INFILTRATION; INTRACAUDAL; PERINEURAL
Status: DISCONTINUED
Start: 2018-07-03 | End: 2018-07-03 | Stop reason: HOSPADM

## 2018-07-03 RX ORDER — FENTANYL CITRATE 50 UG/ML
INJECTION, SOLUTION INTRAMUSCULAR; INTRAVENOUS
Status: DISCONTINUED
Start: 2018-07-03 | End: 2018-07-03 | Stop reason: HOSPADM

## 2018-07-03 RX ORDER — FENTANYL CITRATE 50 UG/ML
INJECTION, SOLUTION INTRAMUSCULAR; INTRAVENOUS
Status: DISCONTINUED | OUTPATIENT
Start: 2018-07-03 | End: 2018-07-03 | Stop reason: HOSPADM

## 2018-07-03 RX ORDER — METHYLPREDNISOLONE ACETATE 80 MG/ML
INJECTION, SUSPENSION INTRA-ARTICULAR; INTRALESIONAL; INTRAMUSCULAR; SOFT TISSUE
Status: DISCONTINUED | OUTPATIENT
Start: 2018-07-03 | End: 2018-07-03 | Stop reason: HOSPADM

## 2018-07-03 RX ORDER — SODIUM CHLORIDE, SODIUM LACTATE, POTASSIUM CHLORIDE, CALCIUM CHLORIDE 600; 310; 30; 20 MG/100ML; MG/100ML; MG/100ML; MG/100ML
INJECTION, SOLUTION INTRAVENOUS CONTINUOUS
Status: DISCONTINUED | OUTPATIENT
Start: 2018-07-03 | End: 2018-07-03 | Stop reason: HOSPADM

## 2018-07-03 RX ORDER — LIDOCAINE HYDROCHLORIDE 20 MG/ML
INJECTION, SOLUTION EPIDURAL; INFILTRATION; INTRACAUDAL; PERINEURAL
Status: DISCONTINUED | OUTPATIENT
Start: 2018-07-03 | End: 2018-07-03 | Stop reason: HOSPADM

## 2018-07-03 RX ORDER — MIDAZOLAM HYDROCHLORIDE 1 MG/ML
INJECTION INTRAMUSCULAR; INTRAVENOUS
Status: DISCONTINUED
Start: 2018-07-03 | End: 2018-07-03 | Stop reason: HOSPADM

## 2018-07-03 RX ADMIN — FENTANYL CITRATE 50 MCG: 50 INJECTION, SOLUTION INTRAMUSCULAR; INTRAVENOUS at 02:07

## 2018-07-03 RX ADMIN — LIDOCAINE HYDROCHLORIDE 6 ML: 20 INJECTION, SOLUTION EPIDURAL; INFILTRATION; INTRACAUDAL; PERINEURAL at 02:07

## 2018-07-03 RX ADMIN — SODIUM CHLORIDE, SODIUM LACTATE, POTASSIUM CHLORIDE, CALCIUM CHLORIDE: 600; 310; 30; 20 INJECTION, SOLUTION INTRAVENOUS at 01:07

## 2018-07-03 RX ADMIN — METHYLPREDNISOLONE ACETATE 80 MG: 80 INJECTION, SUSPENSION INTRA-ARTICULAR; INTRALESIONAL; INTRAMUSCULAR; SOFT TISSUE at 02:07

## 2018-07-03 RX ADMIN — MIDAZOLAM HYDROCHLORIDE 2 MG: 2 INJECTION, SOLUTION INTRAMUSCULAR; INTRAVENOUS at 02:07

## 2018-07-03 RX ADMIN — LIDOCAINE HYDROCHLORIDE 10 ML: 10 INJECTION, SOLUTION EPIDURAL; INFILTRATION; INTRACAUDAL; PERINEURAL at 02:07

## 2018-07-03 RX ADMIN — BUPIVACAINE HYDROCHLORIDE 6 ML: 2.5 INJECTION, SOLUTION EPIDURAL; INFILTRATION; INTRACAUDAL at 02:07

## 2018-07-03 NOTE — DISCHARGE INSTRUCTIONS
Pain injection instructions:     Steroids take about a 2 weeks to relieve pain.  Initially you may get pain relief from the local anesthetic but this may wear off before the steroid works.    No driving for 24 hrs.   Activity as tolerated- gradually increase activities.  Dont lift over 10 lbs for 24 hrs   No heat at injection sites x 2 days. No heating pads, hot tubs, saunas, or swimming in any body of water or pool for 2 days.  Use ice pack for mild swelling and for comfort , apply for 20 minutes, remove for 20 minute intervals. No direct contact of ice itself  to skin.  May shower today. No tub baths for two days.      Resume Aspirin, Plavix, or Coumadin the day after the procedure unless otherwise instructed.   If diabetic,monitor your glucose carefully as steroids can increase your glucose level    Seek immediate medical help for:   Severe increase in your usual pain or appearance of new pain.  Prolonged (mor than 8 hours) or increasing weakness or numbness in the legs or arms.    - Numbing medicine was injected that affects nerves that carry information from       muscles to the  brain and the brain to the muscles.  This numbness can last 6-8 hrs so be very careful and get assistance when standing or walking.    Fever above 101 ,Drainage,redness,active bleeding, or increased swelling at the injection site.  Headache, shortness of breath, chest pain, or breathing problems.

## 2018-07-03 NOTE — OR NURSING
Patient awake, alert, and oriented.  No complaints of pain or discomfort at present time. VSS, tolerating fluids without C/O N/V. Stable for DISCHARGE HOME.

## 2018-07-03 NOTE — H&P
CC: neck pain    HPI: The patient is a 73 y.o. female with a history of cervical spondylosis here for cervical MB RFA. There are no major changes in history and physical from 5/2/18 by Rachael.    Past Medical History:   Diagnosis Date    Anticoagulant long-term use     Anxiety     Arthritis     Atrial fibrillation     Cancer     skin    CHF (congestive heart failure)     Depression     DVT (deep venous thrombosis)     GERD (gastroesophageal reflux disease)     Glaucoma (increased eye pressure)     Hyperlipidemia     diet controlled    Hypertension     Interstitial lung disease     Pneumonia 1/31/2014    Stroke 6-3-14    Stroke     TIA (transient ischemic attack)     TIA (transient ischemic attack)        Past Surgical History:   Procedure Laterality Date    2 heart ablations      3 in total    bilateral cataracts      CHOLECYSTECTOMY      COLONOSCOPY N/A 1/19/2017    Procedure: COLONOSCOPY and EGD;  Surgeon: Jonathan Schultz MD;  Location: [facility];  Service: Endoscopy;  Laterality: N/A;    INJECTION OF ANESTHETIC AGENT AROUND MEDIAL BRANCH NERVES INNERVATING CERVICAL FACET JOINT Right 6/7/2018    Procedure: BLOCK, NERVE, FACET JOINT, MEDIAL BRANCH, CERVICAL;  Surgeon: Galo Clancy MD;  Location: [facility];  Service: Pain Management;  Laterality: Right;  C4, 5, 6    pyloristenosis      skin cancer removal       TONSILLECTOMY         Family History   Problem Relation Age of Onset    Heart disease Father     Ulcers Father     Arthritis Mother     Asthma Mother     Rheum arthritis Mother     Pneumonia Mother     Depression Son     Alzheimer's disease Maternal Uncle     Rheum arthritis Maternal Grandmother     Emphysema Maternal Grandfather     Cancer Maternal Grandfather         kidney    Kidney disease Maternal Grandfather     Cancer Paternal Grandmother         lung= smoker    Pneumonia Paternal Grandfather     Breast cancer Neg Hx     Ovarian cancer Neg Hx        Social History  "    Social History    Marital status:      Spouse name: N/A    Number of children: N/A    Years of education: N/A     Social History Main Topics    Smoking status: Never Smoker    Smokeless tobacco: Never Used    Alcohol use No    Drug use: No    Sexual activity: Yes     Partners: Male     Other Topics Concern    None     Social History Narrative    None       Current Facility-Administered Medications   Medication Dose Route Frequency Provider Last Rate Last Dose    lactated ringers infusion   Intravenous Continuous Galo Clancy MD           Review of patient's allergies indicates:   Allergen Reactions    Atorvastatin      Other reaction(s): Joint pain    Augmentin [amoxicillin-pot clavulanate]      Other reaction(s): Mental Status Change    Baclofen (bulk) Nausea And Vomiting    Ciprofloxacin (bulk)     Decongest tabs      Other reaction(s): increased heart rate    Erythromycin Other (See Comments)    Flecainide Hives     And SOB. No reaction to Lidocaine     Fluoxetine      Other reaction(s): heart palpitations  Other reaction(s): anxiety    Lisinopril Other (See Comments)     cough    Morphine Other (See Comments)     confusion    Venlafaxine analogues      Changes in BP and increases heart rate       Afrin [oxymetazoline] Palpitations    Caffeine Palpitations    Tizanidine Anxiety     dizziness       Vitals:    06/27/18 0628   Weight: 60.8 kg (134 lb)   Height: 5' 7" (1.702 m)       REVIEW OF SYSTEMS:     GENERAL: No weight loss, malaise or fevers.  HEENT:  No recent changes in vision or hearing  NECK: Negative for lumps, no difficulty with swallowing.  RESPIRATORY: Negative for cough, wheezing or shortness of breath, patient denies any recent URI.  CARDIOVASCULAR: Negative for chest pain, leg swelling or palpitations.  GI: Negative for abdominal discomfort, blood in stools or black stools or change in bowel habits.  MUSCULOSKELETAL: See HPI.  SKIN: Negative for lesions, rash, and " itching.  PSYCH: No suicidal or homicidal ideations, no current mood disturbances.  HEMATOLOGY/LYMPHOLOGY: Negative for prolonged bleeding, bruising easily or swollen nodes. Patient is not currently taking any anti-coagulants  ENDO: No history of diabetes or thyroid dysfunction  NEURO: No history of syncope, paralysis, seizures or tremors.All other reviewed and negative other than HPI.    Physical exam:  Gen: A and O x3, pleasant, well-groomed  Skin: No rashes or obvious lesions  HEENT: PERRLA, no obvious deformities on ears or in canals. No thyroid masses, trachea midline, no palpable lymph nodes in neck, axilla.  CVS: Regular rate and rhythm, normal S1 and S2, no murmurs.  Resp: Clear to auscultation bilaterally.  Abdomen: Soft, NT/ND, normal bowel sounds present.  Musculoskeletal/Neuro: Moving all extremities    Assessment:  Osteoarthritis of cervical spine, unspecified spinal osteoarthritis complication status  -     Place in Outpatient; Standing  -     Vital signs; Standing  -     Place 18-22 gauage peripheral IV ; Standing  -     Verify informed consent; Standing  -     Notify physician ; Standing  -     Notify physician ; Standing  -     Notify physician (specify); Standing  -     Diet NPO; Standing    Other spondylosis, cervical region  -     Case Request Operating Room: RADIOFREQUENCY THERMAL COAGULATION, NERVE, SPINAL, CERVICAL, MEDIAL BRANCH OF POSTERIOR RAMUS    Other orders  -     lactated ringers infusion; Inject into the vein continuous.          PLAN: Cervical MB RFA      This patient has been cleared for surgery in an ambulatory surgical facility    ASA 3,  Mallampatti Score 3  No history of anesthetic complications  Plan for RN IV sedation

## 2018-07-03 NOTE — OP NOTE
PROCEDURE DATE: 7/3/2018    PROCEDURE:  Radiofrequency ablation of the C4,5,6 medial branch nerves on the right-side under fluoroscopy    DIAGNOSIS:  Other Cervical spondylosis  Post Op Diagnosis: Same    PHYSICIAN: Galo Clancy MD    MEDICATIONS INJECTED:  From a mixture of 6ml of 0.25%bupivicaine and 80mg of methylprednisone, 1ml of this solution was injected at each level.    LOCAL ANESTHETIC USED:   1ml of lidocaine 1% at each level    SEDATION MEDICATIONS: RN IV sedation    ESTIMATED BLOOD LOSS:  None    COMPLICATIONS:  None    TECHNIQUE:   A time-out taken to identify patient and procedure side prior to starting the procedure.  The patient was positioned in the prone position. The patient was prepped and draped in the usual sterile fashion using ChloraPrep and sterile towels.  The levels were determined under fluoroscopic guidance using a slightly posteriorly oblique view.   Local anesthetic was infiltrated superficially at the skin.  Then a 100 mm 20g bent tip RF needle was inserted to the anatomic location of the midsection of the lateral masses. A cross table view was then taken to ensure that needles did not cross into neural foramina.  Impedance was less than 800 ohms at each level.  Motor stimulation up to 2 volts confirmed there was no nerve root involvement at each level. Medication was then injected slowly.  Ablation was done per level utilizing radiofrequency generator at 80°C for 60 seconds. The patient tolerated the procedure well.     The patient was monitored after the procedure.  Patient was given post procedure and discharge instructions to follow at home.  The patient was discharged in a stable condition

## 2018-07-03 NOTE — DISCHARGE SUMMARY
[facility]  Discharge Note  Short Stay    Admit Date: 7/3/2018    Discharge Date and Time: 7/3/2018    Attending Physician: Galo Clancy MD     Discharge Provider: Galo Clancy    Diagnoses:  Active Hospital Problems    Diagnosis  POA    *Other spondylosis, cervical region [M47.892]  Yes      Resolved Hospital Problems    Diagnosis Date Resolved POA   No resolved problems to display.       Hospital Course: Cervical mB RFA  Discharged Condition: Good    Final Diagnoses:   Active Hospital Problems    Diagnosis  POA    *Other spondylosis, cervical region [M47.892]  Yes      Resolved Hospital Problems    Diagnosis Date Resolved POA   No resolved problems to display.       Disposition: Home or Self Care    Follow up/Patient Instructions:    Medications:  Reconciled Home Medications:      Medication List      CONTINUE taking these medications    amiodarone 100 MG Tab  Commonly known as:  PACERONE  Take 2 tablets (200 mg total) by mouth once daily.     ammonium lactate 12 % lotion  Commonly known as:  LAC-HYDRIN     ATROPINE SULFATE (PF) OPHT  Apply to eye.     COSOPT 22.3-6.8 mg/mL ophthalmic solution  Generic drug:  dorzolamide-timolol 2-0.5%  Place 1 drop into both eyes 2 (two) times daily. Twice a day     furosemide 20 MG tablet  Commonly known as:  LASIX  Take 20 mg by mouth once daily.     * gabapentin 100 MG capsule  Commonly known as:  NEURONTIN  Take 100 mg by mouth 3 (three) times daily.     * gabapentin 300 MG capsule  Commonly known as:  NEURONTIN  TAKE ONE CAPSULE BY MOUTH ONCE DAILY IN THE EVENING     * gabapentin 100 MG capsule  Commonly known as:  NEURONTIN  Take 1 capsule (100 mg total) by mouth once daily.     homatropine 5 % ophthalmic solution  Commonly known as:  ISOPTO HOMATROPINE  INSTILL 1 DROP IN RIGHT EYE DAILY     INV metoprolol tartrate 50 MG Tab  Commonly known as:  LOPRESSOR  Take 1 tablet (50 mg total) by mouth 2 (two) times daily.     * LORazepam 1 MG tablet  Commonly known as:   ATIVAN  TAKE 1 TABLET BY MOUTH DAILY     * LORazepam 0.5 MG tablet  Commonly known as:  ATIVAN  Take 0.5 mg by mouth daily as needed.     losartan 100 MG tablet  Commonly known as:  COZAAR  TAKE 1 TABLET (100 MG TOTAL) BY MOUTH ONCE DAILY.     pantoprazole 40 MG tablet  Commonly known as:  PROTONIX  Take 1 tablet (40 mg total) by mouth once daily.     rosuvastatin 40 MG Tab  Commonly known as:  CRESTOR  TAKE 1 TABLET (40 MG TOTAL) BY MOUTH EVERY EVENING.     spironolactone 25 MG tablet  Commonly known as:  ALDACTONE  Take 1 tablet (25 mg total) by mouth once daily.     VIIBRYD 40 mg Tab tablet  Generic drug:  vilazodone  Take 40 mg by mouth once daily.     XARELTO 20 mg Tab  Generic drug:  rivaroxaban  TAKE 1 TABLET (20 MG TOTAL) BY MOUTH DAILY WITH DINNER OR EVENING MEAL.        * This list has 5 medication(s) that are the same as other medications prescribed for you. Read the directions carefully, and ask your doctor or other care provider to review them with you.            STOP taking these medications    metroNIDAZOLE 0.75 % Lotn     minocycline 100 MG capsule  Commonly known as:  MINOCIN,DYNACIN     timolol maleate 0.5% 0.5 % Drop  Commonly known as:  TIMOPTIC            Discharge Procedure Orders  Call MD for:  temperature >100.4     Call MD for:  persistent nausea and vomiting or diarrhea     Call MD for:  severe uncontrolled pain     Call MD for:  redness, tenderness, or signs of infection (pain, swelling, redness, odor or green/yellow discharge around incision site)     Call MD for:  difficulty breathing or increased cough     Call MD for:  severe persistent headache          Follow up with MD in 2-3 weeks    Discharge Procedure Orders (must include Diet, Follow-up, Activity):    Discharge Procedure Orders (must include Diet, Follow-up, Activity)  Call MD for:  temperature >100.4     Call MD for:  persistent nausea and vomiting or diarrhea     Call MD for:  severe uncontrolled pain     Call MD for:   redness, tenderness, or signs of infection (pain, swelling, redness, odor or green/yellow discharge around incision site)     Call MD for:  difficulty breathing or increased cough     Call MD for:  severe persistent headache

## 2018-07-05 VITALS
RESPIRATION RATE: 17 BRPM | BODY MASS INDEX: 21.03 KG/M2 | OXYGEN SATURATION: 97 % | HEART RATE: 60 BPM | DIASTOLIC BLOOD PRESSURE: 70 MMHG | TEMPERATURE: 98 F | WEIGHT: 134 LBS | SYSTOLIC BLOOD PRESSURE: 129 MMHG | HEIGHT: 67 IN

## 2018-07-05 RX ORDER — LOSARTAN POTASSIUM 100 MG/1
TABLET ORAL
Qty: 90 TABLET | Refills: 3 | Status: SHIPPED | OUTPATIENT
Start: 2018-07-05 | End: 2018-09-05

## 2018-07-05 NOTE — TELEPHONE ENCOUNTER
----- Message from Tammy Shields sent at 7/5/2018  3:33 PM CDT -----  Contact: Patient  Ayla, patient [phone] calling to speak with nurse in office. Please advise. Thanks.

## 2018-07-05 NOTE — TELEPHONE ENCOUNTER
Called and spoke with Ms. Dela Cruz, She stated that she needed an appt with Dr. Calderon. I offered her an appt on Sept 5th @ 1p. She accepted. No further issues noted.

## 2018-07-13 ENCOUNTER — TELEPHONE (OUTPATIENT)
Dept: CARDIOLOGY | Facility: CLINIC | Age: 73
End: 2018-07-13

## 2018-07-13 ENCOUNTER — TELEPHONE (OUTPATIENT)
Dept: FAMILY MEDICINE | Facility: CLINIC | Age: 73
End: 2018-07-13

## 2018-07-13 NOTE — TELEPHONE ENCOUNTER
Called and spoke with Mr. Neil, she stated that her hands were peeling, and she is bleeding more than normal. I advised her that Xarelto is a blood thinner, she stated she is aware of this she has been on it for 4 year. I advised her I would send a message to Dr. Jurado. She verbalized understanding. No further issues noted.

## 2018-07-13 NOTE — TELEPHONE ENCOUNTER
Patient advised her hand is peeling more than normal. She advised that her dermatologist takes care of this problem. I advised patient to call her dermatologist to see if she can be seen or call in a rx.

## 2018-07-13 NOTE — TELEPHONE ENCOUNTER
----- Message from Rox Temple LPN sent at 7/12/2018  4:55 PM CDT -----  Contact: same  Spoke to patient notified krys ferguson notified patient will forward message to Dr Jurado staff  ----- Message -----  From: Mark AG Frisard  Sent: 7/12/2018   3:37 PM  To: Sophia Marcano Staff ([facility])    Patient called in and wanted to see if she could be squeezed in tomorrow Friday 7/13/18.  Patient has requested Krys only.  Patient had a reaction to her Rx for Xeralto and wanted Krys to take a look at her hand.  Patient was offered an appt with Krys for next week but declined.    Patient call back number is [phone])

## 2018-07-13 NOTE — TELEPHONE ENCOUNTER
----- Message from Riddhi Carbone sent at 7/13/2018 12:34 PM CDT -----  Contact: Self  Patient needs to get in  Monday to see  for skin peeling of her hand   No appts available on Monday   Please call back [phone]

## 2018-07-14 DIAGNOSIS — E78.5 HYPERLIPIDEMIA: ICD-10-CM

## 2018-07-14 DIAGNOSIS — I63.032 CEREBRAL INFARCTION DUE TO THROMBOSIS OF LEFT CAROTID ARTERY: ICD-10-CM

## 2018-07-15 RX ORDER — ROSUVASTATIN CALCIUM 40 MG/1
40 TABLET, COATED ORAL NIGHTLY
Qty: 90 TABLET | Refills: 3 | Status: SHIPPED | OUTPATIENT
Start: 2018-07-15 | End: 2019-08-27 | Stop reason: SDUPTHER

## 2018-07-20 ENCOUNTER — TELEPHONE (OUTPATIENT)
Dept: CARDIOLOGY | Facility: CLINIC | Age: 73
End: 2018-07-20

## 2018-07-20 NOTE — TELEPHONE ENCOUNTER
Called to reschedule appt per pt request. Scheduled for 9/20 and will be added to waiting list as well

## 2018-07-20 NOTE — TELEPHONE ENCOUNTER
----- Message from Elsi Maradiaga sent at 7/19/2018  3:34 PM CDT -----  Type:  Sooner Apoointment Request    Caller is requesting a sooner appointment.      Name of Caller: Patient   When is the first available appointment?  08/30/18  Symptoms:  Tingling in feet, light headed upon standing  Best Call Back Number:  [phone]  Additional Information:  Patient requesting to be seen prior to 08/01/18 before going [place] the country.

## 2018-07-25 ENCOUNTER — OFFICE VISIT (OUTPATIENT)
Dept: PAIN MEDICINE | Facility: CLINIC | Age: 73
End: 2018-07-25
Payer: MEDICARE

## 2018-07-25 VITALS
BODY MASS INDEX: 21.06 KG/M2 | DIASTOLIC BLOOD PRESSURE: 55 MMHG | WEIGHT: 134.5 LBS | HEART RATE: 70 BPM | SYSTOLIC BLOOD PRESSURE: 88 MMHG

## 2018-07-25 DIAGNOSIS — M47.892 OTHER SPONDYLOSIS, CERVICAL REGION: Primary | ICD-10-CM

## 2018-07-25 DIAGNOSIS — M54.12 CERVICAL RADICULOPATHY: ICD-10-CM

## 2018-07-25 DIAGNOSIS — M79.10 MYALGIA: ICD-10-CM

## 2018-07-25 DIAGNOSIS — M50.30 DDD (DEGENERATIVE DISC DISEASE), CERVICAL: ICD-10-CM

## 2018-07-25 PROCEDURE — 99213 OFFICE O/P EST LOW 20 MIN: CPT | Mod: PBBFAC,PN | Performed by: PHYSICIAN ASSISTANT

## 2018-07-25 PROCEDURE — 99214 OFFICE O/P EST MOD 30 MIN: CPT | Mod: S$PBB,,, | Performed by: PHYSICIAN ASSISTANT

## 2018-07-25 PROCEDURE — 99999 PR PBB SHADOW E&M-EST. PATIENT-LVL III: CPT | Mod: PBBFAC,,, | Performed by: PHYSICIAN ASSISTANT

## 2018-07-25 RX ORDER — METRONIDAZOLE 7.5 MG/G
LOTION TOPICAL
Refills: 2 | Status: ON HOLD | COMMUNITY
Start: 2018-07-06 | End: 2020-01-24 | Stop reason: HOSPADM

## 2018-07-25 RX ORDER — PREDNISONE 10 MG/1
10 TABLET ORAL DAILY
COMMUNITY
Start: 2018-07-24 | End: 2018-10-14

## 2018-07-25 RX ORDER — TRAMADOL HYDROCHLORIDE 50 MG/1
50 TABLET ORAL EVERY 6 HOURS PRN
Qty: 90 TABLET | Refills: 0 | Status: SHIPPED | OUTPATIENT
Start: 2018-07-25 | End: 2018-08-24

## 2018-07-25 NOTE — PROGRESS NOTES
Referring Physician: No ref. provider found    PCP: Jan Olivo MD    CC:  R shoulder and neck pain    Interval History:  Mrs. Dela Cruz is a 73 y.o. female with neck pain who presents for f/u s/p Cervical MB RFA on right at C4, 5, 6. Reports minimal benefit at this point. Procedure was 3 weeks ago. Cervical CHRISTOPHER at C7-T1 provided 100% relief for 3 weeks.  No upper extremity sensory or motor deficits. The pt has made no recent changes to her medication regimen. No recent trauma or stimulus for symptom presentation. She is planning on traveling to [place] for 1 month in August. She requests a prescription for Tramadol for her trip. Pain today is rated 8/10.      Prior HPI:   Ayla Dela Cruz is a 73 y.o. female who presents as a new patient from Dr. Grubbs for 2.5 month history of R shoulder/mid back pain. The pain first began when she lifted some books at a bookstore in early November. She began feeling the pain in her R lateral neck/suprascapular region, with radiation down the back/side of her arm to the top of the elbow. She was treated with some topical creams, chiropractor adjustments, and then was placed on gabapentin by Dr. Grubbs, which has helped her pain a considerable amount. She states that the pain is intermittent, aching, sharp, shooting & radiates to her elbow. Worsened by sitting, or lying in bed. 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     ROS:  CONSTITUTIONAL: No fevers, chills, night sweats, wt. loss, appetite changes  SKIN: no rashes or itching  ENT: No headaches, head trauma, vision changes, or eye pain  LYMPH NODES: None noticed   CV: No chest pain, palpitations.   RESP: No shortness of breath, dyspnea on exertion, cough, wheezing, or hemoptysis  GI: No nausea, emesis, diarrhea, constipation, melena, hematochezia, pain.    : No dysuria, hematuria, urgency, or frequency   HEME: No easy bruising, bleeding problems  PSYCHIATRIC: No anxiety, psychosis, hallucinations. +ve  depression  NEURO: No seizures, memory loss, dizziness, +ve difficulty sleeping  MSK: +HPI      Past Medical History:   Diagnosis Date    Anticoagulant long-term use     Anxiety     Arthritis     Atrial fibrillation     Cancer     skin    CHF (congestive heart failure)     Depression     DVT (deep venous thrombosis)     GERD (gastroesophageal reflux disease)     Glaucoma (increased eye pressure)     Hyperlipidemia     diet controlled    Hypertension     Interstitial lung disease     Pneumonia 1/31/2014    Stroke 6-3-14    Stroke     TIA (transient ischemic attack)     TIA (transient ischemic attack)      Past Surgical History:   Procedure Laterality Date    2 heart ablations      3 in total    bilateral cataracts      CHOLECYSTECTOMY      COLONOSCOPY N/A 1/19/2017    Procedure: COLONOSCOPY and EGD;  Surgeon: Jonathan Schultz MD;  Location[facility] ENDO;  Service: Endoscopy;  Laterality: N/A;    INJECTION OF ANESTHETIC AGENT AROUND MEDIAL BRANCH NERVES INNERVATING CERVICAL FACET JOINT Right 6/7/2018    Procedure: BLOCK, NERVE, FACET JOINT, MEDIAL BRANCH, CERVICAL;  Surgeon: Galo Clancy MD;  Location: [facility] OR;  Service: Pain Management;  Laterality: Right;  C4, 5, 6    pyloristenosis      RADIOFREQUENCY THERMAL COAGULATION OF MEDIAL BRANCH OF POSTERIOR RAMUS OF CERVICAL SPINAL NERVE Right 7/3/2018    Procedure: RADIOFREQUENCY THERMAL COAGULATION, NERVE, SPINAL, CERVICAL, MEDIAL BRANCH OF POSTERIOR RAMUS;  Surgeon: Galo Clancy MD;  Location: [facility] OR;  Service: Pain Management;  Laterality: Right;  C4,5,6 - Burned at 80 degrees C. for 75 seconds each site    skin cancer removal       TONSILLECTOMY       Family History   Problem Relation Age of Onset    Heart disease Father     Ulcers Father     Arthritis Mother     Asthma Mother     Rheum arthritis Mother     Pneumonia Mother     Depression Son     Alzheimer's disease Maternal Uncle     Rheum arthritis Maternal Grandmother     Emphysema  Maternal Grandfather     Cancer Maternal Grandfather         kidney    Kidney disease Maternal Grandfather     Cancer Paternal Grandmother         lung= smoker    Pneumonia Paternal Grandfather     Breast cancer Neg Hx     Ovarian cancer Neg Hx      Social History     Social History    Marital status:      Spouse name: N/A    Number of children: N/A    Years of education: N/A     Social History Main Topics    Smoking status: Never Smoker    Smokeless tobacco: Never Used    Alcohol use No    Drug use: No    Sexual activity: Yes     Partners: Male     Other Topics Concern    Not on file     Social History Narrative    No narrative on file         Medications/Allergies: See med card    Vitals:    07/25/18 1435   BP: (!) 88/55   Pulse: 70   Weight: 61 kg (134 lb 7.7 oz)   PainSc:   8         Physical exam:    GENERAL: A and O x3, the patient appears well groomed and is in no acute distress.  Skin: No rashes or obvious lesions  HEENT: normocephalic, atraumatic  CARDIOVASCULAR:  RRR  LUNGS: non labored breathing  ABDOMEN: soft, nontender   UPPER EXTREMITIES: Normal alignment, normal range of motion, no atrophy, no skin changes,  hair growth and nail growth normal and equal bilaterally. No swelling, no tenderness.    LOWER EXTREMITIES:  Normal alignment, normal range of motion, no atrophy, no skin changes,  hair growth and nail growth normal and equal bilaterally. No swelling, no tenderness.  CERVICAL SPINE:  Cervical spine: ROM is full in flexion, extension and lateral rotation with increased pain on the right side with lateral rotation and extension.   Spurling's maneuver causes no neck pain to either side.  Myofascial exam: tender to palpation along suprascapular muscle and anterior pressure of shoulder.  MENTAL STATUS: normal orientation, speech, language, and fund of knowledge for social situation.  Emotional state appropriate.       CRANIAL NERVES:  II:  PERRL bilaterally,   III,IV,VI: EOMI.     V:  Facial sensation equal bilaterally  VII:  Facial motor function normal.  VIII:  Hearing equal to finger rub bilaterally  IX/X: Gag normal, palate symmetric  XI:  Shoulder shrug equal, head turn equal  XII:  Tongue midline without fasciculations      MOTOR: Tone and bulk: normal bilateral upper and lower Strength: normal   Delt Bi Tri WE WF     R 5 5 5 5 5 5   L 5 5 5 5 5 5     IP ADD ABD Quad TA Gas HAM  R 5 5 5 5 5 5 5  L 5 5 5 5 5 5 5    SENSATION: Light touch and pinprick intact bilaterally  REFLEXES: normal, symmetric, nonbrisk.  Toes down, no clonus. No hoffmans.  GAIT: normal rise, base, steps, and arm swing.        Imaging:  MRI C-spine 12/2017  There is a 2 mm retrolisthesis of C4 on C5 and C6 on C7.  There is reversal of the normal cervical lordosis which could relate to neck muscle spasm.The cervical vertebral bodies show normal signal intensity and height with no indication of acute fracture or pathologic marrow replacement process.    There is degenerative disc desiccation at every cervical level with marginal osteophyte formation and disc space narrowing noted at C3-C4, C4-C5, C5-C6, and to a lesser extent, C6-C7.  There is congenital spinal canal narrowing present.    The cervical cord is normal in signal intensity at all levels with no indication of myelomalacia or cord edema. The incidentally observed soft tissues of the neck show no significant abnormalities.    C2-C3: There is bilateral hypertrophic facet arthropathy.  There is left ligamentum flavum thickening.  No definite acquired central canal or neuroforaminal stenosis.    C3-C4: There is a posterior disc osteophyte complex with a superimposed broad central disc protrusion which flattens the ventral cord.  There is ligamentum flavum thickening, bilateral uncovertebral spurring, and bilateral facet arthropathy, left greater than right.  There is moderate overall central canal stenosis, moderate-severe left neuroforaminal stenosis, and  moderate right neuroforaminal stenosis.    C4-C5: There is a bulging posterior disc osteophyte complex, ligamentum flavum thickening, bilateral uncovertebral spurring, and facet arthropathy, right greater than left.  There is severe bilateral neural foraminal stenosis and moderate overall central canal stenosis.  No abnormal cord signal.  There is flattening of the ventral cord.    C5-C6: There is a posterior disc osteophyte complex and bilateral uncovertebral spurring, right greater than left.  No left neuroforaminal stenosis.  There is mild-moderate right neuroforaminal stenosis and mild-moderate overall central canal stenosis.    C6-C7: There is a 2 mm retrolisthesis of C6 on C7 with uncovering of the intervertebral disc and a superimposed posterior disc osteophyte complex.  There is bilateral uncovertebral spurring, left greater than right.  There is severe left and moderate right neuroforaminal stenosis.  There is ligamentum flavum thickening.  There is moderate central canal stenosis with flattening of the ventral cord.    C7-T1: No central canal or neuroforaminal stenosis. No disc protrusion or extrusion.    Assessment:  Mrs. Dela Cruz is a 72 y.o. female with chronic neck and shoulder pain.     1. Other spondylosis, cervical region    2. DDD (degenerative disc disease), cervical    3. Cervical radiculopathy    4. Myalgia      Plan:  1. Recommend physical therapy and home exercise regimen to improve strength   2. Continue Gabapentin 600 mg daily   3. Reassured that full relief from RFA could take up to 6 weeks. We will monitor progress  4. Consider repeat cervical CHRISTOPHER  5. Tramadol 50 mg q 8 h prn #90. No refills.  6 f/u prn   All medication management was performed by Dr. Galo Clancy

## 2018-08-10 ENCOUNTER — TELEPHONE (OUTPATIENT)
Dept: CARDIOLOGY | Facility: CLINIC | Age: 73
End: 2018-08-10

## 2018-08-20 RX ORDER — GABAPENTIN 300 MG/1
CAPSULE ORAL
Qty: 30 CAPSULE | Refills: 2 | Status: SHIPPED | OUTPATIENT
Start: 2018-08-20 | End: 2018-12-03 | Stop reason: SDUPTHER

## 2018-09-05 ENCOUNTER — OFFICE VISIT (OUTPATIENT)
Dept: CARDIOLOGY | Facility: CLINIC | Age: 73
End: 2018-09-05
Payer: MEDICARE

## 2018-09-05 VITALS
BODY MASS INDEX: 20.66 KG/M2 | SYSTOLIC BLOOD PRESSURE: 136 MMHG | OXYGEN SATURATION: 99 % | HEIGHT: 67 IN | DIASTOLIC BLOOD PRESSURE: 86 MMHG | WEIGHT: 131.63 LBS | HEART RATE: 68 BPM

## 2018-09-05 DIAGNOSIS — Z86.79 S/P ABLATION OF ATRIAL FLUTTER: ICD-10-CM

## 2018-09-05 DIAGNOSIS — I48.0 PAROXYSMAL ATRIAL FIBRILLATION: Primary | ICD-10-CM

## 2018-09-05 DIAGNOSIS — Z98.890 S/P ABLATION OF ATRIAL FLUTTER: ICD-10-CM

## 2018-09-05 DIAGNOSIS — I48.92 ATRIAL FLUTTER, UNSPECIFIED TYPE: ICD-10-CM

## 2018-09-05 DIAGNOSIS — Z86.79 S/P ABLATION OF ATRIAL FIBRILLATION: ICD-10-CM

## 2018-09-05 DIAGNOSIS — Z98.890 S/P ABLATION OF ATRIAL FIBRILLATION: ICD-10-CM

## 2018-09-05 DIAGNOSIS — I49.3 VPC (VENTRICULAR PREMATURE COMPLEX): ICD-10-CM

## 2018-09-05 DIAGNOSIS — J84.9 INTERSTITIAL LUNG DISEASE: ICD-10-CM

## 2018-09-05 DIAGNOSIS — E78.2 MIXED HYPERLIPIDEMIA: ICD-10-CM

## 2018-09-05 DIAGNOSIS — I10 ESSENTIAL HYPERTENSION: ICD-10-CM

## 2018-09-05 DIAGNOSIS — Z86.73 H/O: CVA (CEREBROVASCULAR ACCIDENT): ICD-10-CM

## 2018-09-05 PROCEDURE — 99214 OFFICE O/P EST MOD 30 MIN: CPT | Mod: S$PBB,,, | Performed by: INTERNAL MEDICINE

## 2018-09-05 PROCEDURE — 93005 ELECTROCARDIOGRAM TRACING: CPT | Mod: PBBFAC,PO | Performed by: INTERNAL MEDICINE

## 2018-09-05 PROCEDURE — 99999 PR PBB SHADOW E&M-EST. PATIENT-LVL IV: CPT | Mod: PBBFAC,,, | Performed by: INTERNAL MEDICINE

## 2018-09-05 PROCEDURE — 93010 ELECTROCARDIOGRAM REPORT: CPT | Mod: S$PBB,,, | Performed by: INTERNAL MEDICINE

## 2018-09-05 PROCEDURE — 99214 OFFICE O/P EST MOD 30 MIN: CPT | Mod: PBBFAC,PO,25 | Performed by: INTERNAL MEDICINE

## 2018-09-05 NOTE — PROGRESS NOTES
aSubjective:      Cardiologist: Barrett Jurado MD    I had the pleasure of seeing Ayla Dela Cruz in follow up for her history of atrial arrhythmias and PVI. She is a 73 year old female with a history of HTN, HLD, and TIA in 2014, whose history of arrhythmias dates back to her 30s when she began to experience sudden onset palpitations. She was started on Tambacor at that time, which improved her symptoms. She was started on Coumadin at age 55 years. In the mid-1990s, she underwent an ablation for what sounds like atrial flutter in [place], [place]. In 1997 she underwent a second ablation which she was told was unsuccessful. Since that time she has undergone two electrical cardioversions, once in the mid-2000s (which lasted 5 years), and another around 2010. Around 2014 her arrhythmias recurred around the time she had her TIA. She was started on Xarelto and restarted on Tambacor at that time. Notably, on 100 mg bid she was having AF recurrences and SOB. The dose was lowered to 50 mg bid and eventually stopped. When I initially saw her she was only on Metoprolol.    Recent cardiac studies include an echo from 10/2016 which showed an EF of 65-70%, normal LA size, and a PASP of 64 mmHg (EF 40% in 11/2014).    I reviewed all ECGs in the EMR at her initial office visit. ECGs from 4029-9027 showed sinus rhythm. ECGs from 1/2014 and 4/2014 showed AF. ECGs between 6/2014 and 1/2016 showed sinus bradycardia and NSR. All ECGs between 1/16/2016 and 5/2017 showed either AF or AFL. When in AFL, RVR was generally seen.    In 9/2017, a PVI was performed. All four PVs were reconnected from a prior PVI, and successfully reisolated. A septal MA flutter line was also created due to frequent inductions during the case. The existing CTI-line was found to be intact. She was discharged on Amiodarone 100 mg daily. At her 11/2017 follow-up, Ms. Dela Cruz was feeling well apart from occasional AVILA relieved with lasix. Her energy level  had vastly improved since pre-ablation. An ECG showed sinus rhythm. Amiodarone was stopped at that visit (I was concerned it was causing her intermittent AVILA). Stopping Amiodarone improved these symptoms. A 48 hour Holter monitor performed in 11/2017 showed sinus rhythm with an average HR of 58 bpm.     At her 2/2018 visit Ms. Dela Cruz continued to feel well, with no complaints. However, beginning in early 8/2018, Ms. Dela Cruz began to note significant AVILA, lightheadedness, and dizziness, which temporally correlates with her starting Tramadol. She has been off this medication for a few days. She also cut losartan from 100 mg to 50 mg once daily on her own. I reviewed recent HR and BP trends, with SBPs frequently in the 80-90s mmHg range.    My interpretation of today's ECG is sinus rhythm with frequent PACs.    Review of Systems   Constitution: Positive for malaise/fatigue. Negative for decreased appetite, weight gain and weight loss.   HENT: Negative for sore throat.    Eyes: Negative for blurred vision.   Cardiovascular: Positive for dyspnea on exertion. Negative for chest pain, irregular heartbeat, leg swelling, near-syncope, orthopnea, palpitations, paroxysmal nocturnal dyspnea and syncope.   Respiratory: Negative for shortness of breath.    Skin: Negative for rash.   Musculoskeletal: Negative for arthritis.   Gastrointestinal: Negative for abdominal pain.   Neurological: Positive for light-headedness. Negative for focal weakness.   Psychiatric/Behavioral: Negative for altered mental status.        Objective:    Physical Exam   Constitutional: She is oriented to person, place, and time. She appears well-developed and well-nourished. No distress.   HENT:   Head: Normocephalic and atraumatic.   Mouth/Throat: Oropharynx is clear and moist.   Eyes: Conjunctivae are normal. Pupils are equal, round, and reactive to light. No scleral icterus.   Neck: Normal range of motion. Neck supple. No JVD present. No thyromegaly  present.   Cardiovascular: Normal rate, regular rhythm, normal heart sounds and intact distal pulses. Exam reveals no gallop and no friction rub.   No murmur heard.  Pulmonary/Chest: Effort normal and breath sounds normal. No respiratory distress. She has no rales.   Abdominal: Soft. Bowel sounds are normal. She exhibits no distension.   Musculoskeletal: She exhibits no edema.   Neurological: She is alert and oriented to person, place, and time.   Skin: Skin is warm and dry.   Psychiatric: She has a normal mood and affect. Her behavior is normal.   Vitals reviewed.        Assessment:       1. Paroxysmal atrial fibrillation, ablations X 2 1995, 1997, CHADS-VAS score 5, HAS-BLED score 5     2. Atrial flutter, unspecified type    3. S/P ablation of atrial fibrillation    4. S/P ablation of atrial flutter    5. VPC (ventricular premature complex)    6. Mixed hyperlipidemia    7. Essential hypertension    8. Interstitial lung disease    9. H/O: CVA (cerebrovascular accident), June 2014         Plan:   In summary, Ayla Dela Cruz is a 73 year old female with a longstanding history of atrial arrhythmias and prior ablations at outside institutions. Her [id number] Score is 5 (age, female, TIA, HTN) so she should remain on anticoagulation indefinitely. She is now 11 months post-PVI and MA flutter line, and maintaining sinus rhythm off Amiodarone. However, she is feeling poorly, with frequent PACs and borderline hypotension. The plan is to stop Losartan, echo in next several weeks, Holter monitor in 6 weeks, and follow-up with me in 8 weeks.    Thank you for allowing me to participate in the care of this patient. Please do not hesitate to call me with any questions or concerns.

## 2018-09-06 ENCOUNTER — TELEPHONE (OUTPATIENT)
Dept: PAIN MEDICINE | Facility: CLINIC | Age: 73
End: 2018-09-06

## 2018-09-06 ENCOUNTER — PATIENT MESSAGE (OUTPATIENT)
Dept: FAMILY MEDICINE | Facility: CLINIC | Age: 73
End: 2018-09-06

## 2018-09-06 DIAGNOSIS — I48.92 ATRIAL FLUTTER, UNSPECIFIED TYPE: Primary | ICD-10-CM

## 2018-09-06 DIAGNOSIS — Z78.9 STATIN INTOLERANCE: ICD-10-CM

## 2018-09-06 DIAGNOSIS — R06.09 DOE (DYSPNEA ON EXERTION): ICD-10-CM

## 2018-09-06 DIAGNOSIS — I48.0 PAROXYSMAL ATRIAL FIBRILLATION: Primary | ICD-10-CM

## 2018-09-06 DIAGNOSIS — Z86.73 H/O: CVA (CEREBROVASCULAR ACCIDENT): ICD-10-CM

## 2018-09-06 DIAGNOSIS — I11.9 HYPERTENSIVE LEFT VENTRICULAR HYPERTROPHY, WITHOUT HEART FAILURE: ICD-10-CM

## 2018-09-06 DIAGNOSIS — G45.8 OTHER SPECIFIED TRANSIENT CEREBRAL ISCHEMIAS: ICD-10-CM

## 2018-09-06 DIAGNOSIS — E78.00 PURE HYPERCHOLESTEROLEMIA: ICD-10-CM

## 2018-09-06 DIAGNOSIS — I48.0 PAF (PAROXYSMAL ATRIAL FIBRILLATION): ICD-10-CM

## 2018-09-06 DIAGNOSIS — I50.32 CHRONIC DIASTOLIC HEART FAILURE: ICD-10-CM

## 2018-09-06 DIAGNOSIS — M47.896 OTHER SPONDYLOSIS, LUMBAR REGION: Primary | ICD-10-CM

## 2018-09-06 DIAGNOSIS — I49.3 VPC (VENTRICULAR PREMATURE COMPLEX): ICD-10-CM

## 2018-09-06 NOTE — TELEPHONE ENCOUNTER
Pt scheduled for a repeat lumbar RFA. Procedure was ok to schedule per Emily. She will need clearance to hold her prescribed xarrelto 2 days prior

## 2018-09-20 ENCOUNTER — OFFICE VISIT (OUTPATIENT)
Dept: CARDIOLOGY | Facility: CLINIC | Age: 73
End: 2018-09-20
Payer: MEDICARE

## 2018-09-20 ENCOUNTER — TELEPHONE (OUTPATIENT)
Dept: CARDIOLOGY | Facility: CLINIC | Age: 73
End: 2018-09-20

## 2018-09-20 VITALS
BODY MASS INDEX: 20.93 KG/M2 | DIASTOLIC BLOOD PRESSURE: 76 MMHG | RESPIRATION RATE: 16 BRPM | HEIGHT: 67 IN | SYSTOLIC BLOOD PRESSURE: 126 MMHG | WEIGHT: 133.38 LBS | OXYGEN SATURATION: 98 % | HEART RATE: 64 BPM

## 2018-09-20 DIAGNOSIS — Z78.9 MEDICATION INTOLERANCE: ICD-10-CM

## 2018-09-20 DIAGNOSIS — I10 ESSENTIAL HYPERTENSION: ICD-10-CM

## 2018-09-20 DIAGNOSIS — I11.9 HYPERTENSIVE LEFT VENTRICULAR HYPERTROPHY, WITHOUT HEART FAILURE: ICD-10-CM

## 2018-09-20 DIAGNOSIS — E78.00 PURE HYPERCHOLESTEROLEMIA: ICD-10-CM

## 2018-09-20 DIAGNOSIS — I50.32 CHRONIC DIASTOLIC HEART FAILURE: ICD-10-CM

## 2018-09-20 DIAGNOSIS — I48.0 PAROXYSMAL ATRIAL FIBRILLATION: Primary | ICD-10-CM

## 2018-09-20 PROCEDURE — 99214 OFFICE O/P EST MOD 30 MIN: CPT | Mod: S$PBB,,, | Performed by: INTERNAL MEDICINE

## 2018-09-20 PROCEDURE — 99999 PR PBB SHADOW E&M-EST. PATIENT-LVL III: CPT | Mod: PBBFAC,,, | Performed by: INTERNAL MEDICINE

## 2018-09-20 PROCEDURE — 99213 OFFICE O/P EST LOW 20 MIN: CPT | Mod: PBBFAC,PO | Performed by: INTERNAL MEDICINE

## 2018-09-20 RX ORDER — AMIODARONE HYDROCHLORIDE 100 MG/1
100 TABLET ORAL
COMMUNITY
End: 2018-10-21

## 2018-09-20 NOTE — H&P (VIEW-ONLY)
Subjective:    Patient ID:  Ayla Dela Cruz is a 73 y.o. female who presents for evaluation of No chief complaint on file.  For PAF was on amiodarone 100 mg daily , AVILA, post AF - ablation, LVH, chronic diastolic HF, medication intolerance, anticipate epidural shot for tomorrow on Xarelto  PCP: Dr. Olivo, see annually, do labs  Surgeon: Dr. Gonsalez, and Dr. Dc  Rheumatologist: Dr. Pak  Prior cardiologist: Dr. Gibson, last seen over a year  Pulmonary: Dr. White  Psychologist: Nu Fermin, AGUILAR, in [place]  Gyn: Dr. Jordan  GI: Dr. Schultz  Neurologist: Dr. Ramirez  Dermatologist: Dr. Zavaleta [place]  Pain: Dr. Clancy, injections to neck and both hips very helpful  Lives with , Jan, here with patient, non-smoker, history of prostate CA,  51 years on 6/24  daughter, Lyric, source of stress  Restarted , now 2-4 times weekly, still enjoys it, playing the flute    Hospitalized 6/3/2014 for acute right sided weakness and slurred speech  DCS - Ayla Dela Cruz is a 69 y.o. female admitted to the services of hospital medicine via the ER for acute CVA. Presented with difficulty speaking, facial drooping and weakness of the right upper and lower extremities. She missed the time window for tPA. ST/PT/OT as well as cardiology and neurology were consulted. Dr. Jurado of cardiology managed A fib. Patient rapidly improved functioning with PT/OT/ST. Currently her goals with PT are met as she is ambulating over 400 feet independently.  She was not approved for IP rehab and refuses SNF. She will be discharged home with outpatient PT/ST/OT evaluation and treatment.    Neurocardio work up  CT head - Mild involutional changes and findings suggesting mild microvascular ischemic change with no acute intracranial findings    Carotid US - No hemodynamically significant stenosis is noted by velocity criteria involving either internal carotid artery.  A hemodynamically significant stenosis is defined as  a stenosis of greater than 50% of the internal carotid artery vessel lumen    ECHO, 6/4/2014, CONCLUSIONS     1 - Concentric hypertrophy. Wall thickness is mildly increased, with the septum and the posterior wall each measuring 1.1 cm across.    2 - Normal left ventricular systolic function (EF 60-65%).     3 - Mild mitral regurgitation.     4 - Left ventricular diastolic dysfunction.     5 - Pulmonary hypertension. estimated PA systolic pressure is 64 mmHg.    6 - Normal right ventricular systolic function .     7 - Suggestion for increase in LV mass from echo on 3/18/2014..     Echo bubble study also negative. Had episode of PAF during monitoring and convert with restart of Flecainide.   Since DC, improving strength in the right hand, right leg feels normal but tire easier. Speech improving. To receive PT/OT/speech today.  Medications reviewed - will DC ASA for now, and know the effects of the Limbitrol and Ativan, uses prn rarely. Some loss of appetite and taste.    Active problems in hospital  Acute left hemispheric CVA, MRI suggest multiple areas, was not on OAC nor antiplatelet Rx  PAF with high CHADS-VAS score, post ablations and DCCs  HTN with LVH  Interstitial lung disease  Pulmonary hypertension  Hyperlipidemia was not on statin    Since visit of 6/20, problem with peripheral swelling, now taking Lasix daily. Last hospitalization / CMP, 6/3 showed K+ 3.4 with normal renal function. Also noted AVILA along with exertional chest heaviness, on-and-off over past several years. Noted it with walking and have to rest. Can last up to an hour. Max grade 10/10, yesterday during the day.  in hospital. Also quite anxious, stopped Limbitrol due to side effects, managed by Dr. Olivo. Quite sedentary and major decrease in appetite, due to depression. No Psychiatrist. Home BP high 175/97. ECG shows NSR, rate 61, right axis, nonspecific T waves abnormalities, prolong QTc 483 msec. Low anterior forces.    Holter,  "6/30/2014:  PREDOMINANT RHYTHM  1. Sinus rhythm with heart rates varying between 43 and 67 bpm with an average of 55 bpm.     VENTRICULAR ARRHYTHMIAS  1. There were frequent polymorphic PVCs totalling 1388 and averaging 40 per hour.  There was 1 couplet.    2. There were no episodes of ventricular tachycardia.    SUPRA VENTRICULAR ARRHYTHMIAS  1. There were very rare PACs totalling 47 and averaging 1 per hour.     2. There were no episodes of sustained supraventricular tachycardia.    SINUS NODE FUNCTION  1. There was no evidence of high grade SA khai block.     AV CONDUCTION  1. There was no evidence of high grade AV block.     DIARY  1. The diary was returned, but not completed    MISCELLANEOUS  1. This was a tape of adequate length (34 hrs).     Since visit of 7/24, better, reviewed by Dr. Olivo and started antidepressant Lexapro. BMP still showed mild hypokalemia of 3.3, not using Lasix at this time. Still feeling fatigue, anxiety, and request refill for Nexium. Discussed need for GI review on chronic PPI. Lack of appetite.  Lexiscan, 7/30/2014, - Nuclear Quantitative Functional Analysis:   LVEF: 66 % (normal is 55 - 69)  LVED Volume: 50 ml (normal is 60 - 98)  LVES Volume: 17 ml (normal is 20 - 42)    Impression: NORMAL MYOCARDIAL PERFUSION  1. The perfusion scan is free of evidence for myocardial ischemia or injury.   2. There is a mild intensity fixed defect in the anteroseptal wall of the left ventricle, secondary to breast attenuation.   3. Resting wall motion is physiologic.   4. Resting LV function is normal.  (normal is 55 - 69)  5. The ventricular volumes are normal at rest and stress.   6. The extracardiac distribution of radioactivity is normal.     No problem with spironolactone, BP improved, home BP similar to office readings.    Since visit of 8/14, had some distress and home monitor showed HR of 40, felt "bad" and nervous to ED, note reviewed, ECG and final pulse count 57-58. Labs reviewed - " "normal renal function and K+ 3.8. Still taking one tab of K+ daily. Not using Lasix. Explained HR discrepancy may be due to VPCs. Reviewed by Ms. Fermin and Lexapro increased to 20 mg, 2 days ago. Feeling "a lot better". Sleeping better. Still sedentary but ready to be more active. Eating better have lost additional 5 lbs since 8/2014.    Since visit of 9/5, still with speech therapy, noted progressive near syncope with SOB and irregular heart beats. Also noted some ankle swelling over the past 2 weeks. ECG shows marked bradycardia, rate 48, right axis, pulmonary pattern, 1st degree AVB. Wellbutrin 100 mg daily along with Lexapro 20 mg by Ms. Fermin NP. BMP showed K+3.5. To use Lasix PRN    Since visit of 9/25, still with marked AVILA, only able to walk about 30' before having to stop. Bothered by increase palpitations during tapering of BB. No definite lung problem, evaluated this year. ECG shows sinus dewayne, rate 53, VPC, RBBB with suggestion of septal MI. No evidence for anemia - CBC 9/3/2014 was normal. Just started doing some activities with arm and leg exercise for the last 2 weeks, Doing some OT alternating with PT every other day.  CXR, 6/3/2014 - Lungs are clear of infiltrate and costophrenic sulci are sharp.  The cardiomediastinal silhouette appears stable.    PET scan, 4/2014 - 1.A hypermetabolic left pulmonary mass is noted with an SUV of 3.0 maximally.  A neoplastic, infectious, or granulomatous process is on the differential. Clinical correlation is suggested.  Close follow-up for resolution or biopsy would be suggested    2.  Elevated right-sided heart pressures are suspected with a large right atrium    3.  Right-sided pleural effusion    4.  Hypermetabolic thyroid gland asymmetrically on the right.  This may relate to thyroiditis, Graves' disease, or neoplastic process.  Ultrasound may be helpful.    5.  Small hypermetabolic focus in the expected location of the left ovary, a female pelvis ultrasound " "may be helpful for further evaluation.  This could relate to a uterine fibroid that has necrosed or infarcted    Biopsy of lung nodule on 5/1/2014 - negative for CA    CTA, 4/2014 - No evidence of pulmonary thromboembolism.    2.  Enlarged cardiac silhouette and secondary findings of elevated right heart pressures with diffuse mosaic lung pattern and right basilar pleural fluid suggesting cardiac decompensation/heart failure.    3. Unchanged 1 cm nodular density within the left upper lobe which previously demonstrated hypermetabolism by PET/CT and unchanged mediastinal lymphadenopathy.    4.  Subpleural nodular density within the right upper lobe is unchanged when compared with the previous study and could represent an area of remote fibrotic scarring.    5.  Heterogeneous appearance of the spleen, possibly due to the phase of contrast enhancement.  Evolving splenic infarction is not entirely excluded.    6. Suggestion of colonic wall thickening involving the descending colon.  Please correlate for symptoms of colitis.    Since visit of 10/14, rehospitalized for HF on 10/26 to 10/29/2014.  DCS - "Ayla Dela Cruz is a 69 y.o. female admitted to the services of hospital medicine via the ER for acute diastolic heart failure. C/o severe SOB and increase in leg swelling over the past two days. Under the care of Dr. Jurado. Recently was taken off metoprolol. History of paroxsymal atrial fib. Hospitalized here in June in this year for acute CVA. It was at that time, pt started Xarelto. Deficits has resolved. No history of heart failure.     Hospital Course: The patient was admitted with acute CHF biventricular and mild elevation of her troponin's at 0.029 - 0.035 and therefore an anginal equivalent was suspected. The patient also had significant hypertension upon admission and although she was not in atrial fibrillation, her EKG showed a significant first degree AV block and RBBB. Discussion was made as to whether or not " "to decrease the patient flecainide; however due to the risk of her going back into atrial fibrillation this was not done. Upon discharge the patient's lisinopril was increased to 10 mg and aldactone was added."    RHC done HEMODYNAMIC RESULTS:    LVEDP (Pre): 24 mmHg  BP: 166/104    Air rest:  PA: 90/34 (54)  PW:  (24)  RVEDP: 16  RA:  (16)    C. SUMMARY:    1. Diastolic dysfunction.  2. - Kee CO 2.64 L/min  3. - Mean systemic pressure 108.6 mmHG, stage II HTN  4. - SVR 41.16 Wood Units  5. - PVR 11.36 Wood Units  6. - Pulmonary HTN due to left sided heart disease and stage II HTN    D. RECOMMENDATIONS:    1. Routine post-cath care.  2. Cardiac rehab referral.  3. Follow up with Dr. Barrett Jurado.  4. - Aggressive BP lowering and diuresis    Repeat ECHO 11/5/2014 CONCLUSIONS     1 - Concentric remodeling. Septal wall thickness is increased, and posterior wall thickness is mildly increased, with the septum measuring 1.3 cm and the posterior wall measuring 1.1 cm across.    2 - Mildly to moderately depressed left ventricular systolic function (EF 40-45%).     3 - Left ventricular diastolic dysfunction. E/e'(lat) is 16    4 - Right ventricular enlargement with mildly depressed systolic function.     5 - Pulmonary hypertension. estimated PA systolic pressure is 56 mmHg.     6 - Intermediate central venous pressure.     7 - No evidence of intracardiac shunt.     8 - No significant change from Echo of 6/4/2014.    Active problems:  Progressive severe SOB, functional class IV  Diastolic dysfunction, elevated BNP and Troponin  Hypokalemia due to diuretics  Secondary Pulmonary HTN with peripheral edema  HTN  History of CVA 6/3/2014  PAF controlled with Flecanide  Marked bradycardia with BB  On OAC  Hematuria    At home receiving PT, OT and speech Rx twice a week, with HH nurse once a week, no problem with medications. Feeling better, sleeping well. Home /73.    Since visit of 11/14, feeling "much better", concern of " "occasional HTN, home BP /66-99, HR . Lot more active, no problem. Not using walker, no CP, SOB, nor dizziness. Recent BMP - normal renal function and K+ 4.1.    Since visit of 12/19/2014, worry about HTN home readings similar to office up to . Morning reading is higher. No HA nor visual abnormalities. Xanax has helped but afraid to use too much. ECG shows sinus arrhythmia, rate of 65, late transition and LAFB. Also bothered with dry cough with the Lisinopril. And started to have return of arm pains that was attributed to atorvastatin, on Crestor. Discussed options.     Since visit of 1/16/2015, noted frequent nocturia, 8-10 times, taking losartan-HCT at night time. Have stopped using Lasix and spironolactone for past 2 months. More active without much problem. Labs normal renal function, K+ 4.2, BNP now 37, A1C 5.6%.    Since visit of 2/18/2015, improving, no further dizziness, some SOB when "stressed", usually helped with alprazolam, use only prn, about 3-6 times weekly. Compliant with medications. Been off Crestor for 6 weeks with concern of muscle problem. Home BP is fine, similar to office.     Since visit of 4/15, appetite returned, feeling a lot better. Active, walking twice a week for about half an hour. Also do calisthenic about twice a week, no problem. Seeing Dr. Zavaleta for generalized pruritis for past 3 months. Cardiac wise less AVILA. Sleeping well. Labs in 5/2015, normal CBC without eosinophilia, thyroid normal, ferritin level low normal at 21. Off Crestor for couple months due to fear of muscle pain but did not find much difference being off medication. Last Lipid in 11/2014 was good, on daily dose of Crestor. Home /79.    Since visit of 7/2/2015, feeling "great", very active without problem, no more AVILA nor dizziness with standing. Compliant with medications, don't miss. Using Tylenol 500 mg BID for arthritis. Notice easy bruising on Xarelto. Home /10.  Holter - " PREDOMINANT RHYTHM  1. Sinus arrhythmia with heart rates varying between 46 and 110 bpm with an average of 73 bpm.     VENTRICULAR ARRHYTHMIAS  1. There were very rare PVCs recorded totalling 8 and averaging less than 1 per hour.     2. There were no episodes of ventricular tachycardia.    SUPRA VENTRICULAR ARRHYTHMIAS  1. There were very frequent PACs totalling 3054 and averaging 132 per hour.  There were 29 bigeminal cycles.  There were 103 couplets.    2. 3% of complexes.    3. There were no episodes of sustained supraventricular tachycardia.    SINUS NODE FUNCTION  1. There was no evidence of high grade SA khai block.     AV CONDUCTION  1. There was no evidence of high grade AV block.     2. The longest RR interval was 1565 msec.     DIARY  1. The diary was properly completed.     2. There was 1 episode of skipping beats reported. The corresponding rhythm strips revealed the following:             During event 1 the rhythm was sinus rhythm at 75 bpm.     3. There was 1 episode of skipped beat reported. The corresponding rhythm strips revealed the following:             During event 1 the rhythm was sinus arrhythmia at 63 bpm.     MISCELLANEOUS  1. This was a tape of adequate length (23 hrs).     Since visit of 10/15, place on new antidepressant, Venlafaxine 75 mg daily, tried 2 and developed rapid HR to 125 bpm, feels more anxious, now switched to Citalopram. Also noted the daily dose of Lorazepam does not seem adequate. Orthostatic VS is positive with lying 142/73, , standing 120/62, . Been taking spironolactone 25 mg for last 3 days. Does feel thirsty. Labs reviewed A1C 5.8%, CBC, BMP were WNL. Lipid shows LDL 99, non-. Goal preferred is <70 and < 100. No problem with 10 mg of Crestor. Had vacation at [place] [employer] [place], walked all over without problem.    Since visit of 1/18/2016, no new problem. 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 Labs with , non-,  "hsCRP at 2.56.     In 10/2016, had acute GB attack with rupture in 8/2016, then tried on Wellbutrin took only 1-2 tabs and BP up to 201/100 with head tightness. Subsequently back to normal, the last 2.5 days took Losartan bid. Discussed Rx options for HTN. Advised to be more active, regular exercise. Lab reviewed LDL in 8/2016 93.     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 Still with daily palpitations, last up to half hours. Have electronic watch, David Barroso, since 9/2016, suppose harmonize patient with the universe. Feeling better if on during the day. Lot of walking at school, no problem.   Holter in 10/2016 - PREDOMINANT RHYTHM  1. Sinus rhythm with heart rates varying between 52 and 144 bpm with an average of 75 bpm.     2. Paroxysmal atrial fibrillation    VENTRICULAR ARRHYTHMIAS  1. There were occasional mostly monomorphic PVCs totalling 596 and averaging 13 per hour.     2. There were no episodes of ventricular tachycardia.    SUPRA VENTRICULAR ARRHYTHMIAS  1. There were frequent PACs totalling 2982 and averaging 69 per hour.  There were 69 bigeminal cycles.  There were 55 couplets.    2. There were no episodes of sustained supraventricular tachycardia.    3. Paroxysmal atrial fibrillation was noted in the the study.     SINUS NODE FUNCTION  1. There was no evidence of high grade SA khai block.     AV CONDUCTION  1. There was no evidence of high grade AV khai filtering.     2. The longest RR interval was 1725 msec.     DIARY  1. The diary was returned, but not completed    MISCELLANEOUS  1. This was a tape of adequate length (43 hrs).     ECHO CONCLUSIONS     1 - Hyperdynamic left ventricular systolic function (EF 65-70%).     2 - Normal left ventricular diastolic function.     3 - Normal right ventricular systolic function .     4 - Pulmonary hypertension. The estimated PA systolic pressure is 64 mmHg.     5 - Less evidence for LVH from Echo in 11/2014.     In 5/2017, bothered by recurrent " "PAF with AVILA after 10 feet walking. Felt better before on Flecainide 100 mg bid, but Holter continued to show PAF. Attempt up titration, problem with itching, switched to propafenone 150 mg tid, continued with itching and reviewed by allergist, treated with 10 days of cortisone with benefit. No hive and no itching, just don't feel good due to the irregular rhythm. History reviewed, had 2 prior AF ablation, last in [place], [place] in around 2000, recall being told not to do again. Recall seeing an [facility] EP doc but didn't like him. Discussed various options. Will stop antiarrhythmic for now, will be going on a 16 days trip to [place]. Reviewed prior medications, have taken Tenormin, metoprolol tartrate, and short-acting diltiazem in the past without problem. Refer to Dr. Calderon for review. ECG in AF, rate 99, PRWP, LAFB    In 11/2017, post AF ablation, felt good for a few weeks, noted some SOB and had review with Dr. Calderon on 11/1 - 72 year old female with a longstanding history of atrial arrhythmias. Her [id number] Score is 5 (age, female, TIA, HTN) so she should remain on anticoagulation indefinitely. She has failed Flecainide. She is now 6 weeks post-PVI and MA flutter line, and maintaining sinus rhythm on low-dose Amiodarone. Given her intermittent AVILA which started post-ablation, I have stopped Amiodarone which I think is the likely culprit. I instructed her to continue taking Lasix PRN, as well as metoprolol and Xarelto." ECG on 11/1 was in NSR, rate 61, PRWP.  Recommended to stop amiodarone but then started to feel the palpitation daily, felt nervous and at the same time being taper down on the nightly Ativan. Did have some breakthrough anxiety attacks. Also had strained the upper back and right arm / shoulder, requesting some Tramadol, tried Jan's Tramadol with good relief. Understand this is an opoid. Used Excedrin with caffeine and bothered by more palpitations.    In 3/2018, here for recurrent " "palpitations, no inciting factors over the past 6 weeks. Had review with Dr. Calderon in 2/2018 - "72 year old female with a longstanding history of atrial arrhythmias and prior ablations at outside institutions. Her [id number] Score is 5 (age, female, TIA, HTN) so she should remain on anticoagulation indefinitely. She is now 5 months post-PVI and MA flutter line, and maintaining sinus rhythm off Amiodarone. The plan is to continue Xarelto, and to see me again in 6 months." Palpitations appears associated with AVILA, had difficulties walking in house or up a ramp. Started 100 mg of amiodarone last week, daily, feels some benefit. ECG today in Sinus rhythm vs wandering atrial pacemaker with frequent PJC, rate 59. Also taking Losartan in the morning appears more effective.  Holter 11/2017 - PREDOMINANT RHYTHM  1. Sinus arrhythmia with heart rates varying between 39 and 102 bpm with an average of 58 bpm.     VENTRICULAR ARRHYTHMIAS  1. There were occasional PVCs totalling 728 and averaging 15 per hour.  There were 5 bigeminal cycles.     2. There were no episodes of ventricular tachycardia.    SUPRA VENTRICULAR ARRHYTHMIAS  1. There was no supraventricular ectopic activity.    SINUS NODE FUNCTION  1. There was no evidence of high grade SA khai block.     AV CONDUCTION  1. There was no evidence of high grade AV block.     2. The longest RR interval was 1820 msec.     DIARY  1. The diary was not returned    MISCELLANEOUS  1. There were occasional hookup related artifacts. Overall, the study was of good quality.     2. This was a tape of adequate length (48 hrs).     in 5/2018, no new problem, still concern of AVILA, usually with walking, variable as little 10 feet or fine with some long walk, can play flute for an hour but find difficulties in holding a note. Off daily amiodarone, uses it prn for palpitation, find helpful. Labs reviewed from 1/2018, TSH, Vitamin D, LDL 68.2, A1C 5.9%, CMP - normal renal function, K+ 3.6. To " "go to [place] for a month in 8/2018.    HPI comments: in 9/2018, return from a month family reunion trip to [place]. Problem with hypotension with Tramadol and Losartan. Had review with Dr. Calderon on 9/5 "Ayla Dela Cruz is a 73 year old female with a longstanding history of atrial arrhythmias and prior ablations at outside institutions. Her [id number] Score is 5 (age, female, TIA, HTN) so she should remain on anticoagulation indefinitely. She is now 11 months post-PVI and MA flutter line, and maintaining sinus rhythm off Amiodarone. However, she is feeling poorly, with frequent PACs and borderline hypotension. The plan is to stop Losartan, echo in next several weeks, Holter monitor in 6 weeks, and follow-up with me in 8 weeks."  Off both medication on 8/31, no problem now. No heart worry. Denies any CP nor SOB. Still teaching. ECG on 9/5 shows NSR with sinus arrhythmia.      ROS    Constitutional: negative for chills, fatigue, fevers, sweats, no further slurred speech, and no dysarthria, appetite improved, intolerance to heat, bruises easily on NOAC and steroid, weight stable since 3/2018.  Eyes: negative for icterus, redness and visual disturbance, have visual halo, no more bleed in the right Iris  Respiratory: negative for asthma, cough have resolved off ACE-I, have dyspnea on exertion, occasional SOB with HR 85 to 100 bpm, have lifelong snoring, [place] score 7 improved down to 3, no hemoptysis, pleurisy/chest pain and wheezing  Cardiovascular: negative for chest pain, chest pressure/discomfort, no further orthostatic dizziness, and no palpitations, no paroxysmal nocturnal dyspnea and syncope  Gastrointestinal: negative for abdominal pain, nausea and vomiting, have GERD  Musculoskeletal:positive for arthralgias, and less right sided muscle weakness with improvement in leg strength, improved bilateral ankle pains - OA and no myalgias  Neurological: negative for coordination problems, dizziness, gait " "problems, headaches, paresthesia, have some tremors but able to draw and no vertigo  Psych: positive for depression, nervousness, anxiety, less stressed due to 's illness have improved    Objective:    Physical Exam    General appearance: alert, appears stated age, cooperative and no distress, decrease skin turgor.  Head: Normocephalic, without obvious abnormality, atraumatic  Eyes:  conjunctivae/corneas clear. PERRL, EOM's intact.    Neck: no adenopathy, no carotid bruit, no JVD, supple, symmetrical, trachea midline and thyroid not enlarged, symmetric, no tenderness/mass/nodules  Lungs:  clear to auscultation bilaterally  Chest wall: no tenderness  Heart: regular rate and rhythm, normal S1, S2 normal, occasional grade 2/6 systolic murmur, click, rub or gallop    Abdomen: soft, non-tender; bowel sounds normal; no masses,  no organomegaly, waist 31" hip 42"  Extremities: extremities no further weakness of the right upper extremity, atraumatic, no cyanosis and have no edema, minor varicose veins  Pulses: 2+ and symmetric    Assessment:       1. Paroxysmal atrial fibrillation, ablations X 2 1995, 1997, CHADS-VAS score 5, HAS-BLED score 5     2. Medication intolerance    3. Essential hypertension    4. Pure hypercholesterolemia    5. Hypertensive left ventricular hypertrophy, without heart failure    6. BMI 24.8 decreased to 20.8    7. Chronic diastolic heart failure, 2014         Plan:       Ayla was seen today for follow-up.    Diagnoses and associated orders for this visit:    Paroxysmal atrial fibrillation, ablations X 2 1995, 1997, CHADS-VAS score 5, HAS-BLED score 5     Medication intolerance    Essential hypertension    Pure hypercholesterolemia    Hypertensive left ventricular hypertrophy, without heart failure    BMI 24.8 decreased to 20.8    Chronic diastolic heart failure, 2014      - All medical issues reviewed, continue current Rx.  - CV status stable, back on antiarrhythmic, all medications " reviewed, patient acknowledge good understanding.  - Recommend using Tylenol as first line pain medications.  - Recommend at least biannual cardiovascular evaluation in view of her significant risk factors, patient 's preference  - Highly recommend 30 minutes of exercise daily, can have Sunday off, with 2-3 sessions of muscle strengthening weekly. A  would be very helpful.    Chronic pruritus, onset 3/2015 - resolved off Xanax, on steroid    Depression - improved    Stress at home - improved  - Consider Yoga, Gilberto Chi, or meditation    Elevated brain natriuretic peptide (BNP) level, range 98 - 1045 - improved     Pulmonary hypertension, with right sided congestive heart failure, acute    Chest pain on exertion - resolved    Peripheral edema - imporved    Anxiety - imporved    Patient Active Problem List   Diagnosis    Paroxysmal atrial fibrillation, ablations X 2 1995, 1997, CHADS-VAS score 5, HAS-BLED score 5     Hypertension, 1985    Hyperlipidemia, baseline     GERD (gastroesophageal reflux disease)    Glaucoma (increased eye pressure)    Anxiety    Fibroid uterus    Thickened endometrium    Abnormal CT scan, chest    Interstitial lung disease    H/O: CVA (cerebrovascular accident), June 2014    LVH (left ventricular hypertrophy) due to hypertensive disease    Cardiomegaly    BMI 24.8 decreased to 20.8    Depression    AVILA (dyspnea on exertion)    VPC (ventricular premature complex)    OA (osteoarthritis)    Chronic diastolic heart failure, 2014    Elevated brain natriuretic peptide (BNP) level, range 98 - 1045    Microalbuminuria    Hypoalbuminemia    TIA (transient ischemic attack), 11/3/2014    Statin intolerance    Chronic pruritus, onset 3/2015    Hypotension due to drugs    Sepsis    Acute delirium    Cancer screening    Reflux esophagitis    Colon cancer screening    Atrial flutter    S/P ablation of atrial fibrillation    S/P ablation of atrial  flutter    JOSE (generalized anxiety disorder)    Benzodiazepine withdrawal with complication    Caffeine adverse reaction    Muscle strain    Cervical radiculitis    Other spondylosis, lumbar region    Premature junctional contractions    Heart palpitations    Cervical spondylosis    Other spondylosis, cervical region    Medication intolerance     Total face-to-face time with the patient was 30 minutes and greater than 50% was spent in counseling and coordination of care. The above assessment and plan have been discussed at length. Labs and procedure over the last 6 months reviewed. Problem List updated. Asked to bring in all active medications / pills bottles with next visit.

## 2018-09-20 NOTE — DISCHARGE INSTRUCTIONS
Recovery After Procedural Sedation (Adult)  You have been given medicine by vein to make you sleep during your surgery. This may have included both a pain medicine and sleeping medicine. Most of the effects have worn off. But you may still have some drowsiness for the next 6 to 8 hours.  Home care  Follow these guidelines when you get home:  · For the next 8 hours, you should be watched by a responsible adult. This person should make sure your condition is not getting worse.  · Don't drink any alcohol for the next 24 hours.  · Don't drive, operate dangerous machinery, or make important business or personal decisions during the next 24 hours.  Note: Your healthcare provider may tell you not to take any medicine by mouth for pain or sleep in the next 4 hours. These medicines may react with the medicines you were given in the hospital. This could cause a much stronger response than usual.  Follow-up care  Follow up with your healthcare provider if you are not alert and back to your usual level of activity within 12 hours.  When to seek medical advice  Call your healthcare provider right away if any of these occur:  · Drowsiness gets worse  · Weakness or dizziness gets worse  · Repeated vomiting  · You can't be awakened   Date Last Reviewed: 10/18/2016  © 2631-5472 [employer]. [address] [place] [address], [place] [address]. All rights reserved. This information is not intended as a substitute for professional medical care. Always follow your healthcare professional's instructions.         Radiofrequency of Nerves    After this procedure you may have increased pain for three days and lingering pain for up to 6 weeks.   Most patients feel better after 4-6  weeks.    Use your pain medications as needed but the goal of this treartment is to wean you off your pain medication.  You may have weakness after the procedure due to the numbing injection.  You may feel a sunburn effect and some patients may need a burn  cream for the skin.    Use ice every 10 minutes every hour for up to 2 days  Do not use a heating pad or take tub baths or swim for 2 days.  You may shower today  Gradually increase your activities.  Dont lift anything over 10 lbs for the first 24 hours  Dont drive the day of the procedure.  Wait until tomorrow to resume any blood thinners (aspirin, Plavix, Coumadin) but you may resume all your other medications today.  If Diabetic, monitor you glucose carefully due to steroids  used for this procedure    Seek Medical Attention if you have:  Severe pain or headache  Fever or chills  Redness or swelling around the injection site   Difficulty breathing  Vomiting or Increasing numbness or weakness in arms or legs

## 2018-09-20 NOTE — PROGRESS NOTES
Subjective:    Patient ID:  Ayla Dela Cruz is a 73 y.o. female who presents for evaluation of No chief complaint on file.  For PAF was on amiodarone 100 mg daily , AVILA, post AF - ablation, LVH, chronic diastolic HF, medication intolerance, anticipate epidural shot for tomorrow on Xarelto  PCP: Dr. Olivo, see annually, do labs  Surgeon: Dr. Gonsalez, and Dr. Dc  Rheumatologist: Dr. Pak  Prior cardiologist: Dr. Gibson, last seen over a year  Pulmonary: Dr. White  Psychologist: Nu Fermin, AGUILAR, in [place]  Gyn: Dr. Jordan  GI: Dr. Schultz  Neurologist: Dr. Ramirez  Dermatologist: Dr. Zavaleta [place]  Pain: Dr. Clancy, injections to neck and both hips very helpful  Lives with , Jan, here with patient, non-smoker, history of prostate CA,  51 years on 6/24  daughter, Lyric, source of stress  Restarted , now 2-4 times weekly, still enjoys it, playing the flute    Hospitalized 6/3/2014 for acute right sided weakness and slurred speech  DCS - Ayla Dela Cruz is a 69 y.o. female admitted to the services of hospital medicine via the ER for acute CVA. Presented with difficulty speaking, facial drooping and weakness of the right upper and lower extremities. She missed the time window for tPA. ST/PT/OT as well as cardiology and neurology were consulted. Dr. Jurado of cardiology managed A fib. Patient rapidly improved functioning with PT/OT/ST. Currently her goals with PT are met as she is ambulating over 400 feet independently.  She was not approved for IP rehab and refuses SNF. She will be discharged home with outpatient PT/ST/OT evaluation and treatment.    Neurocardio work up  CT head - Mild involutional changes and findings suggesting mild microvascular ischemic change with no acute intracranial findings    Carotid US - No hemodynamically significant stenosis is noted by velocity criteria involving either internal carotid artery.  A hemodynamically significant stenosis is defined as  a stenosis of greater than 50% of the internal carotid artery vessel lumen    ECHO, 6/4/2014, CONCLUSIONS     1 - Concentric hypertrophy. Wall thickness is mildly increased, with the septum and the posterior wall each measuring 1.1 cm across.    2 - Normal left ventricular systolic function (EF 60-65%).     3 - Mild mitral regurgitation.     4 - Left ventricular diastolic dysfunction.     5 - Pulmonary hypertension. estimated PA systolic pressure is 64 mmHg.    6 - Normal right ventricular systolic function .     7 - Suggestion for increase in LV mass from echo on 3/18/2014..     Echo bubble study also negative. Had episode of PAF during monitoring and convert with restart of Flecainide.   Since DC, improving strength in the right hand, right leg feels normal but tire easier. Speech improving. To receive PT/OT/speech today.  Medications reviewed - will DC ASA for now, and know the effects of the Limbitrol and Ativan, uses prn rarely. Some loss of appetite and taste.    Active problems in hospital  Acute left hemispheric CVA, MRI suggest multiple areas, was not on OAC nor antiplatelet Rx  PAF with high CHADS-VAS score, post ablations and DCCs  HTN with LVH  Interstitial lung disease  Pulmonary hypertension  Hyperlipidemia was not on statin    Since visit of 6/20, problem with peripheral swelling, now taking Lasix daily. Last hospitalization / CMP, 6/3 showed K+ 3.4 with normal renal function. Also noted AVILA along with exertional chest heaviness, on-and-off over past several years. Noted it with walking and have to rest. Can last up to an hour. Max grade 10/10, yesterday during the day.  in hospital. Also quite anxious, stopped Limbitrol due to side effects, managed by Dr. Olivo. Quite sedentary and major decrease in appetite, due to depression. No Psychiatrist. Home BP high 175/97. ECG shows NSR, rate 61, right axis, nonspecific T waves abnormalities, prolong QTc 483 msec. Low anterior forces.    Holter,  "6/30/2014:  PREDOMINANT RHYTHM  1. Sinus rhythm with heart rates varying between 43 and 67 bpm with an average of 55 bpm.     VENTRICULAR ARRHYTHMIAS  1. There were frequent polymorphic PVCs totalling 1388 and averaging 40 per hour.  There was 1 couplet.    2. There were no episodes of ventricular tachycardia.    SUPRA VENTRICULAR ARRHYTHMIAS  1. There were very rare PACs totalling 47 and averaging 1 per hour.     2. There were no episodes of sustained supraventricular tachycardia.    SINUS NODE FUNCTION  1. There was no evidence of high grade SA khai block.     AV CONDUCTION  1. There was no evidence of high grade AV block.     DIARY  1. The diary was returned, but not completed    MISCELLANEOUS  1. This was a tape of adequate length (34 hrs).     Since visit of 7/24, better, reviewed by Dr. Olivo and started antidepressant Lexapro. BMP still showed mild hypokalemia of 3.3, not using Lasix at this time. Still feeling fatigue, anxiety, and request refill for Nexium. Discussed need for GI review on chronic PPI. Lack of appetite.  Lexiscan, 7/30/2014, - Nuclear Quantitative Functional Analysis:   LVEF: 66 % (normal is 55 - 69)  LVED Volume: 50 ml (normal is 60 - 98)  LVES Volume: 17 ml (normal is 20 - 42)    Impression: NORMAL MYOCARDIAL PERFUSION  1. The perfusion scan is free of evidence for myocardial ischemia or injury.   2. There is a mild intensity fixed defect in the anteroseptal wall of the left ventricle, secondary to breast attenuation.   3. Resting wall motion is physiologic.   4. Resting LV function is normal.  (normal is 55 - 69)  5. The ventricular volumes are normal at rest and stress.   6. The extracardiac distribution of radioactivity is normal.     No problem with spironolactone, BP improved, home BP similar to office readings.    Since visit of 8/14, had some distress and home monitor showed HR of 40, felt "bad" and nervous to ED, note reviewed, ECG and final pulse count 57-58. Labs reviewed - " "normal renal function and K+ 3.8. Still taking one tab of K+ daily. Not using Lasix. Explained HR discrepancy may be due to VPCs. Reviewed by Ms. Fermin and Lexapro increased to 20 mg, 2 days ago. Feeling "a lot better". Sleeping better. Still sedentary but ready to be more active. Eating better have lost additional 5 lbs since 8/2014.    Since visit of 9/5, still with speech therapy, noted progressive near syncope with SOB and irregular heart beats. Also noted some ankle swelling over the past 2 weeks. ECG shows marked bradycardia, rate 48, right axis, pulmonary pattern, 1st degree AVB. Wellbutrin 100 mg daily along with Lexapro 20 mg by Ms. Fermin NP. BMP showed K+3.5. To use Lasix PRN    Since visit of 9/25, still with marked AVILA, only able to walk about 30' before having to stop. Bothered by increase palpitations during tapering of BB. No definite lung problem, evaluated this year. ECG shows sinus dewayne, rate 53, VPC, RBBB with suggestion of septal MI. No evidence for anemia - CBC 9/3/2014 was normal. Just started doing some activities with arm and leg exercise for the last 2 weeks, Doing some OT alternating with PT every other day.  CXR, 6/3/2014 - Lungs are clear of infiltrate and costophrenic sulci are sharp.  The cardiomediastinal silhouette appears stable.    PET scan, 4/2014 - 1.A hypermetabolic left pulmonary mass is noted with an SUV of 3.0 maximally.  A neoplastic, infectious, or granulomatous process is on the differential. Clinical correlation is suggested.  Close follow-up for resolution or biopsy would be suggested    2.  Elevated right-sided heart pressures are suspected with a large right atrium    3.  Right-sided pleural effusion    4.  Hypermetabolic thyroid gland asymmetrically on the right.  This may relate to thyroiditis, Graves' disease, or neoplastic process.  Ultrasound may be helpful.    5.  Small hypermetabolic focus in the expected location of the left ovary, a female pelvis ultrasound " "may be helpful for further evaluation.  This could relate to a uterine fibroid that has necrosed or infarcted    Biopsy of lung nodule on 5/1/2014 - negative for CA    CTA, 4/2014 - No evidence of pulmonary thromboembolism.    2.  Enlarged cardiac silhouette and secondary findings of elevated right heart pressures with diffuse mosaic lung pattern and right basilar pleural fluid suggesting cardiac decompensation/heart failure.    3. Unchanged 1 cm nodular density within the left upper lobe which previously demonstrated hypermetabolism by PET/CT and unchanged mediastinal lymphadenopathy.    4.  Subpleural nodular density within the right upper lobe is unchanged when compared with the previous study and could represent an area of remote fibrotic scarring.    5.  Heterogeneous appearance of the spleen, possibly due to the phase of contrast enhancement.  Evolving splenic infarction is not entirely excluded.    6. Suggestion of colonic wall thickening involving the descending colon.  Please correlate for symptoms of colitis.    Since visit of 10/14, rehospitalized for HF on 10/26 to 10/29/2014.  DCS - "Ayla Dela Cruz is a 69 y.o. female admitted to the services of hospital medicine via the ER for acute diastolic heart failure. C/o severe SOB and increase in leg swelling over the past two days. Under the care of Dr. Jurado. Recently was taken off metoprolol. History of paroxsymal atrial fib. Hospitalized here in June in this year for acute CVA. It was at that time, pt started Xarelto. Deficits has resolved. No history of heart failure.     Hospital Course: The patient was admitted with acute CHF biventricular and mild elevation of her troponin's at 0.029 - 0.035 and therefore an anginal equivalent was suspected. The patient also had significant hypertension upon admission and although she was not in atrial fibrillation, her EKG showed a significant first degree AV block and RBBB. Discussion was made as to whether or not " "to decrease the patient flecainide; however due to the risk of her going back into atrial fibrillation this was not done. Upon discharge the patient's lisinopril was increased to 10 mg and aldactone was added."    RHC done HEMODYNAMIC RESULTS:    LVEDP (Pre): 24 mmHg  BP: 166/104    Air rest:  PA: 90/34 (54)  PW:  (24)  RVEDP: 16  RA:  (16)    C. SUMMARY:    1. Diastolic dysfunction.  2. - Kee CO 2.64 L/min  3. - Mean systemic pressure 108.6 mmHG, stage II HTN  4. - SVR 41.16 Wood Units  5. - PVR 11.36 Wood Units  6. - Pulmonary HTN due to left sided heart disease and stage II HTN    D. RECOMMENDATIONS:    1. Routine post-cath care.  2. Cardiac rehab referral.  3. Follow up with Dr. Barrett Jurado.  4. - Aggressive BP lowering and diuresis    Repeat ECHO 11/5/2014 CONCLUSIONS     1 - Concentric remodeling. Septal wall thickness is increased, and posterior wall thickness is mildly increased, with the septum measuring 1.3 cm and the posterior wall measuring 1.1 cm across.    2 - Mildly to moderately depressed left ventricular systolic function (EF 40-45%).     3 - Left ventricular diastolic dysfunction. E/e'(lat) is 16    4 - Right ventricular enlargement with mildly depressed systolic function.     5 - Pulmonary hypertension. estimated PA systolic pressure is 56 mmHg.     6 - Intermediate central venous pressure.     7 - No evidence of intracardiac shunt.     8 - No significant change from Echo of 6/4/2014.    Active problems:  Progressive severe SOB, functional class IV  Diastolic dysfunction, elevated BNP and Troponin  Hypokalemia due to diuretics  Secondary Pulmonary HTN with peripheral edema  HTN  History of CVA 6/3/2014  PAF controlled with Flecanide  Marked bradycardia with BB  On OAC  Hematuria    At home receiving PT, OT and speech Rx twice a week, with HH nurse once a week, no problem with medications. Feeling better, sleeping well. Home /73.    Since visit of 11/14, feeling "much better", concern of " "occasional HTN, home BP /66-99, HR . Lot more active, no problem. Not using walker, no CP, SOB, nor dizziness. Recent BMP - normal renal function and K+ 4.1.    Since visit of 12/19/2014, worry about HTN home readings similar to office up to . Morning reading is higher. No HA nor visual abnormalities. Xanax has helped but afraid to use too much. ECG shows sinus arrhythmia, rate of 65, late transition and LAFB. Also bothered with dry cough with the Lisinopril. And started to have return of arm pains that was attributed to atorvastatin, on Crestor. Discussed options.     Since visit of 1/16/2015, noted frequent nocturia, 8-10 times, taking losartan-HCT at night time. Have stopped using Lasix and spironolactone for past 2 months. More active without much problem. Labs normal renal function, K+ 4.2, BNP now 37, A1C 5.6%.    Since visit of 2/18/2015, improving, no further dizziness, some SOB when "stressed", usually helped with alprazolam, use only prn, about 3-6 times weekly. Compliant with medications. Been off Crestor for 6 weeks with concern of muscle problem. Home BP is fine, similar to office.     Since visit of 4/15, appetite returned, feeling a lot better. Active, walking twice a week for about half an hour. Also do calisthenic about twice a week, no problem. Seeing Dr. Zavaleta for generalized pruritis for past 3 months. Cardiac wise less AVILA. Sleeping well. Labs in 5/2015, normal CBC without eosinophilia, thyroid normal, ferritin level low normal at 21. Off Crestor for couple months due to fear of muscle pain but did not find much difference being off medication. Last Lipid in 11/2014 was good, on daily dose of Crestor. Home /79.    Since visit of 7/2/2015, feeling "great", very active without problem, no more AVILA nor dizziness with standing. Compliant with medications, don't miss. Using Tylenol 500 mg BID for arthritis. Notice easy bruising on Xarelto. Home /10.  Holter - " PREDOMINANT RHYTHM  1. Sinus arrhythmia with heart rates varying between 46 and 110 bpm with an average of 73 bpm.     VENTRICULAR ARRHYTHMIAS  1. There were very rare PVCs recorded totalling 8 and averaging less than 1 per hour.     2. There were no episodes of ventricular tachycardia.    SUPRA VENTRICULAR ARRHYTHMIAS  1. There were very frequent PACs totalling 3054 and averaging 132 per hour.  There were 29 bigeminal cycles.  There were 103 couplets.    2. 3% of complexes.    3. There were no episodes of sustained supraventricular tachycardia.    SINUS NODE FUNCTION  1. There was no evidence of high grade SA khai block.     AV CONDUCTION  1. There was no evidence of high grade AV block.     2. The longest RR interval was 1565 msec.     DIARY  1. The diary was properly completed.     2. There was 1 episode of skipping beats reported. The corresponding rhythm strips revealed the following:             During event 1 the rhythm was sinus rhythm at 75 bpm.     3. There was 1 episode of skipped beat reported. The corresponding rhythm strips revealed the following:             During event 1 the rhythm was sinus arrhythmia at 63 bpm.     MISCELLANEOUS  1. This was a tape of adequate length (23 hrs).     Since visit of 10/15, place on new antidepressant, Venlafaxine 75 mg daily, tried 2 and developed rapid HR to 125 bpm, feels more anxious, now switched to Citalopram. Also noted the daily dose of Lorazepam does not seem adequate. Orthostatic VS is positive with lying 142/73, , standing 120/62, . Been taking spironolactone 25 mg for last 3 days. Does feel thirsty. Labs reviewed A1C 5.8%, CBC, BMP were WNL. Lipid shows LDL 99, non-. Goal preferred is <70 and < 100. No problem with 10 mg of Crestor. Had vacation at [place] [place], walked all over without problem.    Since visit of 1/18/2016, no new problem. 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 Labs with , non-,  "hsCRP at 2.56.     In 10/2016, had acute GB attack with rupture in 8/2016, then tried on Wellbutrin took only 1-2 tabs and BP up to 201/100 with head tightness. Subsequently back to normal, the last 2.5 days took Losartan bid. Discussed Rx options for HTN. Advised to be more active, regular exercise. Lab reviewed LDL in 8/2016 93.     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 Still with daily palpitations, last up to half hours. Have electronic watch, David Barroso, since 9/2016, suppose harmonize patient with the universe. Feeling better if on during the day. Lot of walking at school, no problem.   Holter in 10/2016 - PREDOMINANT RHYTHM  1. Sinus rhythm with heart rates varying between 52 and 144 bpm with an average of 75 bpm.     2. Paroxysmal atrial fibrillation    VENTRICULAR ARRHYTHMIAS  1. There were occasional mostly monomorphic PVCs totalling 596 and averaging 13 per hour.     2. There were no episodes of ventricular tachycardia.    SUPRA VENTRICULAR ARRHYTHMIAS  1. There were frequent PACs totalling 2982 and averaging 69 per hour.  There were 69 bigeminal cycles.  There were 55 couplets.    2. There were no episodes of sustained supraventricular tachycardia.    3. Paroxysmal atrial fibrillation was noted in the the study.     SINUS NODE FUNCTION  1. There was no evidence of high grade SA khai block.     AV CONDUCTION  1. There was no evidence of high grade AV khai filtering.     2. The longest RR interval was 1725 msec.     DIARY  1. The diary was returned, but not completed    MISCELLANEOUS  1. This was a tape of adequate length (43 hrs).     ECHO CONCLUSIONS     1 - Hyperdynamic left ventricular systolic function (EF 65-70%).     2 - Normal left ventricular diastolic function.     3 - Normal right ventricular systolic function .     4 - Pulmonary hypertension. The estimated PA systolic pressure is 64 mmHg.     5 - Less evidence for LVH from Echo in 11/2014.     In 5/2017, bothered by recurrent " "PAF with AVILA after 10 feet walking. Felt better before on Flecainide 100 mg bid, but Holter continued to show PAF. Attempt up titration, problem with itching, switched to propafenone 150 mg tid, continued with itching and reviewed by allergist, treated with 10 days of cortisone with benefit. No hive and no itching, just don't feel good due to the irregular rhythm. History reviewed, had 2 prior AF ablation, last in [place], [place] in around 2000, recall being told not to do again. Recall seeing an [facility] EP doc but didn't like him. Discussed various options. Will stop antiarrhythmic for now, will be going on a 16 days trip to [place]. Reviewed prior medications, have taken Tenormin, metoprolol tartrate, and short-acting diltiazem in the past without problem. Refer to Dr. Calderon for review. ECG in AF, rate 99, PRWP, LAFB    In 11/2017, post AF ablation, felt good for a few weeks, noted some SOB and had review with Dr. Calderon on 11/1 - 72 year old female with a longstanding history of atrial arrhythmias. Her [id number] Score is 5 (age, female, TIA, HTN) so she should remain on anticoagulation indefinitely. She has failed Flecainide. She is now 6 weeks post-PVI and MA flutter line, and maintaining sinus rhythm on low-dose Amiodarone. Given her intermittent AVILA which started post-ablation, I have stopped Amiodarone which I think is the likely culprit. I instructed her to continue taking Lasix PRN, as well as metoprolol and Xarelto." ECG on 11/1 was in NSR, rate 61, PRWP.  Recommended to stop amiodarone but then started to feel the palpitation daily, felt nervous and at the same time being taper down on the nightly Ativan. Did have some breakthrough anxiety attacks. Also had strained the upper back and right arm / shoulder, requesting some Tramadol, tried Jan's Tramadol with good relief. Understand this is an opoid. Used Excedrin with caffeine and bothered by more palpitations.    In 3/2018, here for recurrent " "palpitations, no inciting factors over the past 6 weeks. Had review with Dr. Calderon in 2/2018 - "72 year old female with a longstanding history of atrial arrhythmias and prior ablations at outside institutions. Her [id number] Score is 5 (age, female, TIA, HTN) so she should remain on anticoagulation indefinitely. She is now 5 months post-PVI and MA flutter line, and maintaining sinus rhythm off Amiodarone. The plan is to continue Xarelto, and to see me again in 6 months." Palpitations appears associated with AVILA, had difficulties walking in house or up a ramp. Started 100 mg of amiodarone last week, daily, feels some benefit. ECG today in Sinus rhythm vs wandering atrial pacemaker with frequent PJC, rate 59. Also taking Losartan in the morning appears more effective.  Holter 11/2017 - PREDOMINANT RHYTHM  1. Sinus arrhythmia with heart rates varying between 39 and 102 bpm with an average of 58 bpm.     VENTRICULAR ARRHYTHMIAS  1. There were occasional PVCs totalling 728 and averaging 15 per hour.  There were 5 bigeminal cycles.     2. There were no episodes of ventricular tachycardia.    SUPRA VENTRICULAR ARRHYTHMIAS  1. There was no supraventricular ectopic activity.    SINUS NODE FUNCTION  1. There was no evidence of high grade SA khai block.     AV CONDUCTION  1. There was no evidence of high grade AV block.     2. The longest RR interval was 1820 msec.     DIARY  1. The diary was not returned    MISCELLANEOUS  1. There were occasional hookup related artifacts. Overall, the study was of good quality.     2. This was a tape of adequate length (48 hrs).     in 5/2018, no new problem, still concern of AVILA, usually with walking, variable as little 10 feet or fine with some long walk, can play flute for an hour but find difficulties in holding a note. Off daily amiodarone, uses it prn for palpitation, find helpful. Labs reviewed from 1/2018, TSH, Vitamin D, LDL 68.2, A1C 5.9%, CMP - normal renal function, K+ 3.6. To " "go to [place] for a month in 8/2018.    HPI comments: in 9/2018, return from a month family reunion trip to [place]. Problem with hypotension with Tramadol and Losartan. Had review with Dr. Calderon on 9/5 "Ayla Dela Cruz is a 73 year old female with a longstanding history of atrial arrhythmias and prior ablations at outside institutions. Her [id number] Score is 5 (age, female, TIA, HTN) so she should remain on anticoagulation indefinitely. She is now 11 months post-PVI and MA flutter line, and maintaining sinus rhythm off Amiodarone. However, she is feeling poorly, with frequent PACs and borderline hypotension. The plan is to stop Losartan, echo in next several weeks, Holter monitor in 6 weeks, and follow-up with me in 8 weeks."  Off both medication on 8/31, no problem now. No heart worry. Denies any CP nor SOB. Still teaching. ECG on 9/5 shows NSR with sinus arrhythmia.      ROS    Constitutional: negative for chills, fatigue, fevers, sweats, no further slurred speech, and no dysarthria, appetite improved, intolerance to heat, bruises easily on NOAC and steroid, weight stable since 3/2018.  Eyes: negative for icterus, redness and visual disturbance, have visual halo, no more bleed in the right Iris  Respiratory: negative for asthma, cough have resolved off ACE-I, have dyspnea on exertion, occasional SOB with HR 85 to 100 bpm, have lifelong snoring, Lancaster score 7 improved down to 3, no hemoptysis, pleurisy/chest pain and wheezing  Cardiovascular: negative for chest pain, chest pressure/discomfort, no further orthostatic dizziness, and no palpitations, no paroxysmal nocturnal dyspnea and syncope  Gastrointestinal: negative for abdominal pain, nausea and vomiting, have GERD  Musculoskeletal:positive for arthralgias, and less right sided muscle weakness with improvement in leg strength, improved bilateral ankle pains - OA and no myalgias  Neurological: negative for coordination problems, dizziness, gait " "problems, headaches, paresthesia, have some tremors but able to draw and no vertigo  Psych: positive for depression, nervousness, anxiety, less stressed due to 's illness have improved    Objective:    Physical Exam    General appearance: alert, appears stated age, cooperative and no distress, decrease skin turgor.  Head: Normocephalic, without obvious abnormality, atraumatic  Eyes:  conjunctivae/corneas clear. PERRL, EOM's intact.    Neck: no adenopathy, no carotid bruit, no JVD, supple, symmetrical, trachea midline and thyroid not enlarged, symmetric, no tenderness/mass/nodules  Lungs:  clear to auscultation bilaterally  Chest wall: no tenderness  Heart: regular rate and rhythm, normal S1, S2 normal, occasional grade 2/6 systolic murmur, click, rub or gallop    Abdomen: soft, non-tender; bowel sounds normal; no masses,  no organomegaly, waist 31" hip 42"  Extremities: extremities no further weakness of the right upper extremity, atraumatic, no cyanosis and have no edema, minor varicose veins  Pulses: 2+ and symmetric    Assessment:       1. Paroxysmal atrial fibrillation, ablations X 2 1995, 1997, CHADS-VAS score 5, HAS-BLED score 5     2. Medication intolerance    3. Essential hypertension    4. Pure hypercholesterolemia    5. Hypertensive left ventricular hypertrophy, without heart failure    6. BMI 24.8 decreased to 20.8    7. Chronic diastolic heart failure, 2014         Plan:       Ayla was seen today for follow-up.    Diagnoses and associated orders for this visit:    Paroxysmal atrial fibrillation, ablations X 2 1995, 1997, CHADS-VAS score 5, HAS-BLED score 5     Medication intolerance    Essential hypertension    Pure hypercholesterolemia    Hypertensive left ventricular hypertrophy, without heart failure    BMI 24.8 decreased to 20.8    Chronic diastolic heart failure, 2014      - All medical issues reviewed, continue current Rx.  - CV status stable, back on antiarrhythmic, all medications " reviewed, patient acknowledge good understanding.  - Recommend using Tylenol as first line pain medications.  - Recommend at least biannual cardiovascular evaluation in view of her significant risk factors, patient 's preference  - Highly recommend 30 minutes of exercise daily, can have Sunday off, with 2-3 sessions of muscle strengthening weekly. A  would be very helpful.    Chronic pruritus, onset 3/2015 - resolved off Xanax, on steroid    Depression - improved    Stress at home - improved  - Consider Yoga, Gilberto Chi, or meditation    Elevated brain natriuretic peptide (BNP) level, range 98 - 1045 - improved     Pulmonary hypertension, with right sided congestive heart failure, acute    Chest pain on exertion - resolved    Peripheral edema - imporved    Anxiety - imporved    Patient Active Problem List   Diagnosis    Paroxysmal atrial fibrillation, ablations X 2 1995, 1997, CHADS-VAS score 5, HAS-BLED score 5     Hypertension, 1985    Hyperlipidemia, baseline     GERD (gastroesophageal reflux disease)    Glaucoma (increased eye pressure)    Anxiety    Fibroid uterus    Thickened endometrium    Abnormal CT scan, chest    Interstitial lung disease    H/O: CVA (cerebrovascular accident), June 2014    LVH (left ventricular hypertrophy) due to hypertensive disease    Cardiomegaly    BMI 24.8 decreased to 20.8    Depression    AVILA (dyspnea on exertion)    VPC (ventricular premature complex)    OA (osteoarthritis)    Chronic diastolic heart failure, 2014    Elevated brain natriuretic peptide (BNP) level, range 98 - 1045    Microalbuminuria    Hypoalbuminemia    TIA (transient ischemic attack), 11/3/2014    Statin intolerance    Chronic pruritus, onset 3/2015    Hypotension due to drugs    Sepsis    Acute delirium    Cancer screening    Reflux esophagitis    Colon cancer screening    Atrial flutter    S/P ablation of atrial fibrillation    S/P ablation of atrial  flutter    JOSE (generalized anxiety disorder)    Benzodiazepine withdrawal with complication    Caffeine adverse reaction    Muscle strain    Cervical radiculitis    Other spondylosis, lumbar region    Premature junctional contractions    Heart palpitations    Cervical spondylosis    Other spondylosis, cervical region    Medication intolerance     Total face-to-face time with the patient was 30 minutes and greater than 50% was spent in counseling and coordination of care. The above assessment and plan have been discussed at length. Labs and procedure over the last 6 months reviewed. Problem List updated. Asked to bring in all active medications / pills bottles with next visit.

## 2018-09-20 NOTE — TELEPHONE ENCOUNTER
Called pt per Dr Jurado to come in for earlier time. Pt's  answered and stated pt was working and could not come in, but would be at regular appt at 230 pm.

## 2018-09-21 ENCOUNTER — HOSPITAL ENCOUNTER (OUTPATIENT)
Facility: AMBULARY SURGERY CENTER | Age: 73
Discharge: HOME | End: 2018-09-21
Attending: ANESTHESIOLOGY | Admitting: ANESTHESIOLOGY
Payer: MEDICARE

## 2018-09-21 DIAGNOSIS — M47.892 OTHER SPONDYLOSIS, CERVICAL REGION: Primary | ICD-10-CM

## 2018-09-21 DIAGNOSIS — M54.16 LUMBAR RADICULITIS: ICD-10-CM

## 2018-09-21 PROCEDURE — 64635 DESTROY LUMB/SAC FACET JNT: CPT | Mod: 50,,, | Performed by: ANESTHESIOLOGY

## 2018-09-21 PROCEDURE — 64636 DESTROY L/S FACET JNT ADDL: CPT | Mod: 50,,, | Performed by: ANESTHESIOLOGY

## 2018-09-21 PROCEDURE — G8907 PT DOC NO EVENTS ON DISCHARG: HCPCS | Performed by: ANESTHESIOLOGY

## 2018-09-21 PROCEDURE — 99152 MOD SED SAME PHYS/QHP 5/>YRS: CPT | Mod: ,,, | Performed by: ANESTHESIOLOGY

## 2018-09-21 PROCEDURE — 64636 DESTROY L/S FACET JNT ADDL: CPT | Mod: RT | Performed by: ANESTHESIOLOGY

## 2018-09-21 PROCEDURE — 64635 DESTROY LUMB/SAC FACET JNT: CPT | Mod: LT | Performed by: ANESTHESIOLOGY

## 2018-09-21 RX ORDER — METHYLPREDNISOLONE ACETATE 80 MG/ML
INJECTION, SUSPENSION INTRA-ARTICULAR; INTRALESIONAL; INTRAMUSCULAR; SOFT TISSUE
Status: DISCONTINUED | OUTPATIENT
Start: 2018-09-21 | End: 2018-09-21 | Stop reason: HOSPADM

## 2018-09-21 RX ORDER — BUPIVACAINE HYDROCHLORIDE 2.5 MG/ML
INJECTION, SOLUTION EPIDURAL; INFILTRATION; INTRACAUDAL
Status: DISCONTINUED | OUTPATIENT
Start: 2018-09-21 | End: 2018-09-21 | Stop reason: HOSPADM

## 2018-09-21 RX ORDER — LIDOCAINE HYDROCHLORIDE 20 MG/ML
INJECTION, SOLUTION EPIDURAL; INFILTRATION; INTRACAUDAL; PERINEURAL
Status: DISCONTINUED
Start: 2018-09-21 | End: 2018-09-21 | Stop reason: HOSPADM

## 2018-09-21 RX ORDER — MIDAZOLAM HYDROCHLORIDE 2 MG/2ML
INJECTION, SOLUTION INTRAMUSCULAR; INTRAVENOUS
Status: DISCONTINUED | OUTPATIENT
Start: 2018-09-21 | End: 2018-09-21 | Stop reason: HOSPADM

## 2018-09-21 RX ORDER — SODIUM CHLORIDE, SODIUM LACTATE, POTASSIUM CHLORIDE, CALCIUM CHLORIDE 600; 310; 30; 20 MG/100ML; MG/100ML; MG/100ML; MG/100ML
INJECTION, SOLUTION INTRAVENOUS ONCE AS NEEDED
Status: COMPLETED | OUTPATIENT
Start: 2018-09-21 | End: 2018-09-21

## 2018-09-21 RX ORDER — LIDOCAINE HYDROCHLORIDE 10 MG/ML
INJECTION, SOLUTION EPIDURAL; INFILTRATION; INTRACAUDAL; PERINEURAL
Status: DISCONTINUED | OUTPATIENT
Start: 2018-09-21 | End: 2018-09-21 | Stop reason: HOSPADM

## 2018-09-21 RX ORDER — FENTANYL CITRATE 50 UG/ML
INJECTION, SOLUTION INTRAMUSCULAR; INTRAVENOUS
Status: DISCONTINUED
Start: 2018-09-21 | End: 2018-09-21 | Stop reason: HOSPADM

## 2018-09-21 RX ORDER — LIDOCAINE HYDROCHLORIDE 20 MG/ML
INJECTION, SOLUTION EPIDURAL; INFILTRATION; INTRACAUDAL; PERINEURAL
Status: DISCONTINUED | OUTPATIENT
Start: 2018-09-21 | End: 2018-09-21 | Stop reason: HOSPADM

## 2018-09-21 RX ORDER — FENTANYL CITRATE 50 UG/ML
INJECTION, SOLUTION INTRAMUSCULAR; INTRAVENOUS
Status: DISCONTINUED | OUTPATIENT
Start: 2018-09-21 | End: 2018-09-21 | Stop reason: HOSPADM

## 2018-09-21 RX ORDER — MIDAZOLAM HYDROCHLORIDE 1 MG/ML
INJECTION INTRAMUSCULAR; INTRAVENOUS
Status: DISCONTINUED
Start: 2018-09-21 | End: 2018-09-21 | Stop reason: HOSPADM

## 2018-09-21 RX ADMIN — SODIUM CHLORIDE, SODIUM LACTATE, POTASSIUM CHLORIDE, CALCIUM CHLORIDE: 600; 310; 30; 20 INJECTION, SOLUTION INTRAVENOUS at 01:09

## 2018-09-21 NOTE — OP NOTE
PROCEDURE DATE: 9/21/2018    PROCEDURE:  Radiofrequency ablation of the L3,4,5 medial branch nerves on the bilateral-side utilizing fluoroscopy    DIAGNOSIS:  Other lumbar spondylosis  Post op Diagnosis: Same    PHYSICIAN: Galo Clancy MD    MEDICATIONS INJECTED:  From a mixture of 6ml of 0.25% bupivicaine and 80mg of methylprednisone,  1ml of this solution was injected at each level.    LOCAL ANESTHETIC USED: Lidocaine 1%, 2 ml given at each site.    SEDATION MEDICATIONS: RN IV sedation    ESTIMATED BLOOD LOSS:  None    COMPLICATIONS:  None    TECHNIQUE:  A time out was taken to identify patient and procedure side prior to starting the procedure. Laying in a prone position, the patient was prepped and draped in the usual sterile fashion using ChloraPrep and sterile towels.  The levels were determined under fluoroscopic guidance and then marked.  Local anesthetic was given by raising a wheal at the skin over each site and then infiltrated approximately 2cm deeper.  A 20-gauge  100 mm RF needle was introduced to the anatomic location of the bilateral L3,4,5 medial branch nerves.  Motor stimulation up to 2 Volts at each level confirmed no motor nerve involvement.  Impedance was less than 800 ohms at each level.  1ml of 2% lidocaine was instilled prior to lesioning.  Ablation was performed per level utilizing radiofrequency generator 80°C for 60 seconds.  Needles were then rotated 90° and a second lesion was performed.  The above noted medication was then injected slowly. The patient tolerated the procedure well.     The patient was monitored after the procedure.  Patient was given post procedure and discharge instructions to follow at home.  The patient was discharged in a stable condition

## 2018-09-21 NOTE — DISCHARGE SUMMARY
[facility]  Discharge Note  Short Stay    Admit Date: 9/21/2018    Discharge Date and Time: 9/21/2018    Attending Physician: Galo Clancy MD     Discharge Provider: Galo Clancy    Diagnoses:  Active Hospital Problems    Diagnosis  POA    *Lumbar radiculitis [M54.16]  Yes      Resolved Hospital Problems   No resolved problems to display.       Hospital Course: Lumbar MB RFA  Discharged Condition: Good    Final Diagnoses:   Active Hospital Problems    Diagnosis  POA    *Lumbar radiculitis [M54.16]  Yes      Resolved Hospital Problems   No resolved problems to display.       Disposition: Home or Self Care    Follow up/Patient Instructions:    Medications:  Reconciled Home Medications:      Medication List      CONTINUE taking these medications    amiodarone 100 MG Tab  Commonly known as:  PACERONE  Take 100 mg by mouth as needed.     ammonium lactate 12 % lotion  Commonly known as:  LAC-HYDRIN     ATROPINE SULFATE (PF) OPHT  Apply to eye.     COSOPT 22.3-6.8 mg/mL ophthalmic solution  Generic drug:  dorzolamide-timolol 2-0.5%  Place 1 drop into both eyes 2 (two) times daily. Twice a day     furosemide 20 MG tablet  Commonly known as:  LASIX  Take 20 mg by mouth once daily.     * gabapentin 100 MG capsule  Commonly known as:  NEURONTIN  Take 100 mg by mouth every evening.     * gabapentin 300 MG capsule  Commonly known as:  NEURONTIN  TAKE ONE CAPSULE BY MOUTH ONCE DAILY IN THE EVENING     homatropine 5 % ophthalmic solution  Commonly known as:  ISOPTO HOMATROPINE  INSTILL 1 DROP IN RIGHT EYE DAILY     INV metoprolol tartrate 50 MG Tab  Commonly known as:  LOPRESSOR  Take 1 tablet (50 mg total) by mouth 2 (two) times daily.     * LORazepam 1 MG tablet  Commonly known as:  ATIVAN  TAKE 1 TABLET BY MOUTH DAILY     * LORazepam 0.5 MG tablet  Commonly known as:  ATIVAN  Take 0.5 mg by mouth daily as needed.     metroNIDAZOLE 0.75 % Lotn  APPLY A PEA SIZED AMOUNT TO FACE DAILY     pantoprazole 40 MG  tablet  Commonly known as:  PROTONIX  Take 1 tablet (40 mg total) by mouth once daily.     predniSONE 10 MG tablet  Commonly known as:  DELTASONE  Take 10 mg by mouth once daily.     rosuvastatin 40 MG Tab  Commonly known as:  CRESTOR  TAKE 1 TABLET (40 MG TOTAL) BY MOUTH EVERY EVENING.     spironolactone 25 MG tablet  Commonly known as:  ALDACTONE  Take 1 tablet (25 mg total) by mouth once daily.     VIIBRYD 40 mg Tab tablet  Generic drug:  vilazodone  Take 40 mg by mouth once daily.     XARELTO 20 mg Tab  Generic drug:  rivaroxaban  TAKE 1 TABLET (20 MG TOTAL) BY MOUTH DAILY WITH DINNER OR EVENING MEAL.         * This list has 4 medication(s) that are the same as other medications prescribed for you. Read the directions carefully, and ask your doctor or other care provider to review them with you.              Discharge Procedure Orders   Call MD for:  temperature >100.4     Call MD for:  persistent nausea and vomiting or diarrhea     Call MD for:  severe uncontrolled pain     Call MD for:  redness, tenderness, or signs of infection (pain, swelling, redness, odor or green/yellow discharge around incision site)     Call MD for:  difficulty breathing or increased cough     Call MD for:  severe persistent headache        Follow up with MD in 2-3 weeks    Discharge Procedure Orders (must include Diet, Follow-up, Activity):   Discharge Procedure Orders (must include Diet, Follow-up, Activity)   Call MD for:  temperature >100.4     Call MD for:  persistent nausea and vomiting or diarrhea     Call MD for:  severe uncontrolled pain     Call MD for:  redness, tenderness, or signs of infection (pain, swelling, redness, odor or green/yellow discharge around incision site)     Call MD for:  difficulty breathing or increased cough     Call MD for:  severe persistent headache

## 2018-09-25 VITALS
DIASTOLIC BLOOD PRESSURE: 68 MMHG | SYSTOLIC BLOOD PRESSURE: 127 MMHG | BODY MASS INDEX: 20.66 KG/M2 | WEIGHT: 131.63 LBS | HEIGHT: 67 IN | TEMPERATURE: 98 F | HEART RATE: 60 BPM | OXYGEN SATURATION: 98 % | RESPIRATION RATE: 20 BRPM

## 2018-10-14 ENCOUNTER — HOSPITAL ENCOUNTER (EMERGENCY)
Facility: HOSPITAL | Age: 73
Discharge: HOME | End: 2018-10-14
Attending: EMERGENCY MEDICINE
Payer: MEDICARE

## 2018-10-14 VITALS
WEIGHT: 130 LBS | BODY MASS INDEX: 20.4 KG/M2 | RESPIRATION RATE: 20 BRPM | OXYGEN SATURATION: 99 % | HEART RATE: 81 BPM | SYSTOLIC BLOOD PRESSURE: 123 MMHG | HEIGHT: 67 IN | DIASTOLIC BLOOD PRESSURE: 73 MMHG | TEMPERATURE: 98 F

## 2018-10-14 DIAGNOSIS — J84.10 PULMONARY FIBROSIS, UNSPECIFIED: ICD-10-CM

## 2018-10-14 DIAGNOSIS — R06.02 SHORTNESS OF BREATH: Primary | ICD-10-CM

## 2018-10-14 LAB
ALBUMIN SERPL BCP-MCNC: 2.9 G/DL
ALP SERPL-CCNC: 69 U/L
ALT SERPL W/O P-5'-P-CCNC: 18 U/L
ANION GAP SERPL CALC-SCNC: 9 MMOL/L
AST SERPL-CCNC: 22 U/L
BASOPHILS # BLD AUTO: 0 K/UL
BASOPHILS NFR BLD: 0.2 %
BILIRUB SERPL-MCNC: 0.8 MG/DL
BNP SERPL-MCNC: 94 PG/ML
BUN SERPL-MCNC: 21 MG/DL
CALCIUM SERPL-MCNC: 9.8 MG/DL
CHLORIDE SERPL-SCNC: 107 MMOL/L
CO2 SERPL-SCNC: 24 MMOL/L
CREAT SERPL-MCNC: 0.9 MG/DL
D DIMER PPP IA.FEU-MCNC: 0.41 MG/L FEU
DIFFERENTIAL METHOD: ABNORMAL
EOSINOPHIL # BLD AUTO: 0.2 K/UL
EOSINOPHIL NFR BLD: 2.9 %
ERYTHROCYTE [DISTWIDTH] IN BLOOD BY AUTOMATED COUNT: 14.5 %
EST. GFR  (AFRICAN AMERICAN): >60 ML/MIN/1.73 M^2
EST. GFR  (NON AFRICAN AMERICAN): >60 ML/MIN/1.73 M^2
GLUCOSE SERPL-MCNC: 209 MG/DL
HCT VFR BLD AUTO: 36.5 %
HGB BLD-MCNC: 12.1 G/DL
INR PPP: 1.1
LYMPHOCYTES # BLD AUTO: 0.6 K/UL
LYMPHOCYTES NFR BLD: 7.3 %
MCH RBC QN AUTO: 32.9 PG
MCHC RBC AUTO-ENTMCNC: 33.2 G/DL
MCV RBC AUTO: 99 FL
MONOCYTES # BLD AUTO: 0.7 K/UL
MONOCYTES NFR BLD: 8.6 %
NEUTROPHILS # BLD AUTO: 6.1 K/UL
NEUTROPHILS NFR BLD: 81 %
PLATELET # BLD AUTO: 343 K/UL
PMV BLD AUTO: 6.8 FL
POTASSIUM SERPL-SCNC: 4 MMOL/L
PROT SERPL-MCNC: 6.5 G/DL
PROTHROMBIN TIME: 10.7 SEC
RBC # BLD AUTO: 3.69 M/UL
SODIUM SERPL-SCNC: 140 MMOL/L
TROPONIN I SERPL DL<=0.01 NG/ML-MCNC: 0.02 NG/ML
WBC # BLD AUTO: 7.6 K/UL

## 2018-10-14 PROCEDURE — 84484 ASSAY OF TROPONIN QUANT: CPT

## 2018-10-14 PROCEDURE — 99284 EMERGENCY DEPT VISIT MOD MDM: CPT | Mod: 25

## 2018-10-14 PROCEDURE — 93005 ELECTROCARDIOGRAM TRACING: CPT

## 2018-10-14 PROCEDURE — 85379 FIBRIN DEGRADATION QUANT: CPT

## 2018-10-14 PROCEDURE — 85025 COMPLETE CBC W/AUTO DIFF WBC: CPT

## 2018-10-14 PROCEDURE — 36415 COLL VENOUS BLD VENIPUNCTURE: CPT

## 2018-10-14 PROCEDURE — 83880 ASSAY OF NATRIURETIC PEPTIDE: CPT

## 2018-10-14 PROCEDURE — 85610 PROTHROMBIN TIME: CPT

## 2018-10-14 PROCEDURE — 80053 COMPREHEN METABOLIC PANEL: CPT

## 2018-10-14 RX ORDER — PREDNISONE 20 MG/1
40 TABLET ORAL DAILY
Qty: 10 TABLET | Refills: 0 | Status: SHIPPED | OUTPATIENT
Start: 2018-10-14 | End: 2018-10-19

## 2018-10-14 RX ORDER — ALBUTEROL SULFATE 90 UG/1
1-2 AEROSOL, METERED RESPIRATORY (INHALATION) EVERY 6 HOURS PRN
Qty: 1 INHALER | Refills: 0 | Status: SHIPPED | OUTPATIENT
Start: 2018-10-14 | End: 2018-10-26 | Stop reason: SDUPTHER

## 2018-10-14 RX ORDER — PREDNISONE 20 MG/1
40 TABLET ORAL DAILY
Qty: 10 TABLET | Refills: 0 | Status: SHIPPED | OUTPATIENT
Start: 2018-10-14 | End: 2018-10-14 | Stop reason: SDUPTHER

## 2018-10-14 RX ORDER — ALBUTEROL SULFATE 90 UG/1
1-2 AEROSOL, METERED RESPIRATORY (INHALATION) EVERY 6 HOURS PRN
Qty: 1 INHALER | Refills: 0 | Status: SHIPPED | OUTPATIENT
Start: 2018-10-14 | End: 2018-10-14 | Stop reason: SDUPTHER

## 2018-10-14 NOTE — ED PROVIDER NOTES
Encounter Date: 10/14/2018    SCRIBE #1 NOTE: I, Alessandra Cunningham , am scribing for, and in the presence of, Dr. Villar .       History     Chief Complaint   Patient presents with    Shortness of Breath     C/o increasing  SOB over a period of a few weeks. States she has been seen by multiple MDs and they cant find the cause.        Time seen by provider: 3:13 PM on 10/14/2018    Ayla Dela Cruz is a 73 y.o. female with a PMHx of stroke, TIA, CHF, DVT, and HTN who presents to the ED with complaints of SOB with an onset x 2 months PTA. The patient reports that she's had intermittent SOB since she had an adverse reaction to Tramadol while she was in [place]. She reports that her blood pressure dropped to 90/50 during this reaction as well. Patient saw her cardiologist, Dr. Brady, x 2-3 weeks ago for the SOB. She states that her pharmacist thinks that the SOB may need to see a pulmonologist. She reports that the patient has no SOB while she is laying down, but becomes SOB when she stands up. She also states that she has been having high heart rates in the morning (118). The heart rate returns to normal after she takes metoprolol, but this causes the SOB as well. She has some relief with she bends over in a tripod position. Lastly, she endorses a mild cough. She denies any chest pain, leg swelling, or any other symptoms at this time. SHx includes two heart ablations. Patient is onxarelto Xaralto.       The history is provided by the patient.     Review of patient's allergies indicates:   Allergen Reactions    Atorvastatin      Other reaction(s): Joint pain    Augmentin [amoxicillin-pot clavulanate]      Other reaction(s): Mental Status Change    Baclofen (bulk) Nausea And Vomiting    Ciprofloxacin (bulk)     Decongest tabs      Other reaction(s): increased heart rate    Erythromycin Other (See Comments)    Flecainide Hives     And SOB. No reaction to Lidocaine     Fluoxetine      Other reaction(s): heart  palpitations  Other reaction(s): anxiety    Lisinopril Other (See Comments)     cough    Losartan Other (See Comments)     Hypotension with lightheadedness    Morphine Other (See Comments)     confusion    Tramadol Other (See Comments)     SOB, low BP    Venlafaxine analogues      Changes in BP and increases heart rate       Afrin [oxymetazoline] Palpitations    Caffeine Palpitations    Tizanidine Anxiety     dizziness     Past Medical History:   Diagnosis Date    Anticoagulant long-term use     Anxiety     Arthritis     Atrial fibrillation     Cancer     skin    CHF (congestive heart failure)     Depression     DVT (deep venous thrombosis)     GERD (gastroesophageal reflux disease)     Glaucoma (increased eye pressure)     Hyperlipidemia     diet controlled    Hypertension     Interstitial lung disease     Pneumonia 1/31/2014    Stroke 6-3-14    Stroke     TIA (transient ischemic attack)     TIA (transient ischemic attack)      Past Surgical History:   Procedure Laterality Date    2 heart ablations      3 in total    ABLATION N/A 9/7/2017    [facility]    bilateral cataracts      BLOCK, NERVE, FACET JOINT, MEDIAL BRANCH, CERVICAL Right 6/7/2018    Performed by Galo lCancy MD at [facility]    BLOCK-NERVE-MEDIAL BRANCH-LUMBAR Bilateral 3/6/2018    [facility] at [facility]    BRONCHOSCOPY N/A 5/1/2014    Performed by Jose Eduardo White MD at [facility]    CHOLECYSTECTOMY      CHOLECYSTECTOMY- open N/A 8/31/2016    Performed by Lavon Gonsalez [facility] at [facility] OR    COLONOSCOPY N/A 1/19/2017    Procedure: COLONOSCOPY and EGD;  Surgeon: Jonathan Schultz MD;  Location: [facility];  Service: Endoscopy;  Laterality: N/A;    COLONOSCOPY and EGD N/A 1/19/2017    Performed by Jonathan Schultz MD at [facility]    ESOPHAGOGASTRODUODENOSCOPY (EGD) N/A 1/19/2017    Performed by Jonathan Schultz MD at [facility]    INJECTION OF ANESTHETIC AGENT AROUND MEDIAL  BRANCH NERVES INNERVATING CERVICAL FACET JOINT Right 6/7/2018    Procedure: BLOCK, NERVE, FACET JOINT, MEDIAL BRANCH, CERVICAL;  Surgeon: Galo Clancy MD;  Location: [facility] OR;  Service: Pain Management;  Laterality: Right;  C4, 5, 6    INJECTION-STEROID-EPIDURAL-CERVICAL N/A 4/10/2018    Performed by Gal[facility] Clancy MD at [facility] OR    INJECTION-STEROID-EPIDURAL-CERVICAL N/A 1/29/2018    Performed by [facility] at [facility] OR    pyloristenosis      RADIOFREQUENCY ABLATION OF LUMBAR MEDIAL BRANCH NERVE AT SINGLE LEVEL Bilateral 9/21/2018    Procedure: RADIOFREQUENCY ABLATION, NERVE, SPINAL, LUMBAR, MEDIAL BRANCH, 1 LEVEL;  Surgeon: Galo Clancy MD;  Location: [facility] OR;  Service: Pain Management;  Laterality: Bilateral;  L3, 4, 5    RADIOFREQUENCY ABLATION, NERVE, SPINAL, LUMBAR, MEDIAL BRANCH, 1 LEVEL Bilateral 9/21/2018    Performed by Galo Clancy MD at [facility] OR    RADIOFREQUENCY THERMAL COAGULATION OF MEDIAL BRANCH OF POSTERIOR RAMUS OF CERVICAL SPINAL NERVE Right 7/3/2018    Procedure: RADIOFREQUENCY THERMAL COAGULATION, NERVE, SPINAL, CERVICAL, MEDIAL BRANCH OF POSTERIOR RAMUS;  Surgeon: Galo Clancy MD;  Location: [facility] OR;  Service: Pain Management;  Laterality: Right;  C4,5,6 - Burned at 80 degrees C. for 75 seconds each site    RADIOFREQUENCY THERMAL COAGULATION, NERVE, SPINAL, CERVICAL, MEDIAL BRANCH OF POSTERIOR RAMUS Right 7/3/2018    Performed by Galo Clancy MD at [facility] OR    RADIOFREQUENCY THERMOCOAGULATION (RFTC)-NERVE-MEDIAN BRANCH-LUMBAR Bilateral 3/12/2018    Performed by Galo Clancy MD at [facility] OR    skin cancer removal       TONSILLECTOMY      TRANSESOPHAGEAL ECHOCARDIOGRAM (FELICIA) N/A 9/7/2017    Performed by Fco Calderon MD at [facility] CATH LAB     Family History   Problem Relation Age of Onset    Heart disease Father     Ulcers Father     Arthritis Mother     Asthma Mother     Rheum arthritis Mother     Pneumonia Mother     Depression Son     Alzheimer's disease Maternal Uncle     Rheum  arthritis Maternal Grandmother     Emphysema Maternal Grandfather     Cancer Maternal Grandfather         kidney    Kidney disease Maternal Grandfather     Cancer Paternal Grandmother         lung= smoker    Pneumonia Paternal Grandfather     Breast cancer Neg Hx     Ovarian cancer Neg Hx      Social History     Tobacco Use    Smoking status: Never Smoker    Smokeless tobacco: Never Used   Substance Use Topics    Alcohol use: No    Drug use: No     Review of Systems   Constitutional: Negative for activity change, appetite change, chills, fatigue and fever.   Eyes: Negative for visual disturbance.   Respiratory: Positive for cough and shortness of breath. Negative for apnea.    Cardiovascular: Negative for chest pain, palpitations and leg swelling.   Gastrointestinal: Negative for abdominal distention, abdominal pain, blood in stool, nausea and vomiting.   Genitourinary: Negative for difficulty urinating.   Musculoskeletal: Negative for neck pain.   Skin: Negative for pallor and rash.   Neurological: Negative for headaches.   Hematological: Does not bruise/bleed easily.   Psychiatric/Behavioral: Negative for agitation.       Physical Exam     Initial Vitals [10/14/18 1506]   BP Pulse Resp Temp SpO2   (!) 115/57 90 20 98.3 °F (36.8 °C) 98 %      MAP       --         Physical Exam    Nursing note and vitals reviewed.  Constitutional: She appears well-developed and well-nourished.  Non-toxic appearance. No distress.   HENT:   Head: Normocephalic and atraumatic.   Mouth/Throat: Oropharynx is clear and moist and mucous membranes are normal. No posterior oropharyngeal edema or posterior oropharyngeal erythema.   Eyes: Conjunctivae and EOM are normal. Pupils are equal, round, and reactive to light.   Neck: Normal range of motion. Neck supple. No neck rigidity. No JVD present.   No goiter    Cardiovascular: Normal rate, regular rhythm and intact distal pulses. Exam reveals no gallop and no friction rub.    Murmur  heard.   Systolic murmur is present.  Pulses:       Radial pulses are 2+ on the right side, and 2+ on the left side.        Dorsalis pedis pulses are 2+ on the right side, and 2+ on the left side.        Posterior tibial pulses are 2+ on the right side, and 2+ on the left side.   Pulmonary/Chest: Breath sounds normal. She has no decreased breath sounds. She has no wheezes. She has no rhonchi. She has no rales.   Abdominal: Soft. Bowel sounds are normal. She exhibits no distension. There is no tenderness. There is no rigidity, no rebound and no guarding.   Musculoskeletal: Normal range of motion. She exhibits no edema or tenderness.        Right lower leg: She exhibits no edema.        Left lower leg: She exhibits no edema.   Neurological: She is alert and oriented to person, place, and time. She has normal strength and normal reflexes. No cranial nerve deficit or sensory deficit. She exhibits normal muscle tone. Coordination normal. GCS eye subscore is 4. GCS verbal subscore is 5. GCS motor subscore is 6.   Skin: Skin is warm and dry.   Psychiatric: She has a normal mood and affect. Her speech is normal and behavior is normal. She is not actively hallucinating.         ED Course   Procedures  Labs Reviewed   CBC W/ AUTO DIFFERENTIAL - Abnormal; Notable for the following components:       Result Value    RBC 3.69 (*)     Hematocrit 36.5 (*)     MCV 99 (*)     MCH 32.9 (*)     MPV 6.8 (*)     Lymph # 0.6 (*)     Gran% 81.0 (*)     Lymph% 7.3 (*)     All other components within normal limits   COMPREHENSIVE METABOLIC PANEL - Abnormal; Notable for the following components:    Glucose 209 (*)     Albumin 2.9 (*)     All other components within normal limits   TROPONIN I   B-TYPE NATRIURETIC PEPTIDE   PROTIME-INR   D DIMER, QUANTITATIVE          Imaging Results          X-Ray Chest PA And Lateral (Final result)  Result time 10/14/18 15:37:34    Final result by Subhash Roque MD (10/14/18 15:37:34)                  Impression:      1. COPD with chronic changes of interstitial fibrosis.  2. Bone demineralization.      Electronically signed by: Subhash Jude  Date:    10/14/2018  Time:    15:37             Narrative:    EXAMINATION:  XR CHEST PA AND LATERAL    CLINICAL HISTORY:  sob;    TECHNIQUE:  PA and lateral views of the chest were performed.    COMPARISON:  Chest x-ray 10/26/2014.    FINDINGS:  Pulmonary hyperinflation with flattening diaphragms consistent with COPD.  Mild chronic changes of interstitial fibrosis most predominant within the bilateral lower lobes.  No focal consolidation.  Mild dependent atelectasis at the lung bases.  No significant pleural effusion.    Heart size is normal.  Mediastinal contours unremarkable.  Trachea midline.    There is bone demineralization.  Mild degenerative thoracic spondylosis.                                 Medical Decision Making:   History:   Old Medical Records: I decided to obtain old medical records.  Initial Assessment:   73-year-old woman presents for evaluation of 2 months of shortness of breath mostly dyspnea on exertion.  No orthopnea.  No leg swelling. No fever or chest pain. Chest x-ray is concerning for COPD with chronic findings suspicious for pulmonary fibrosis.  Patient has a history of taking amiodarone intermittently.  She is not significantly anemic.  White blood cell count is normal. No infectious etiology appreciated. CMP unremarkable. Cardiac workup negative. Patient will be referred to pulmonology.  She is in no respiratory distress and is not hypoxic.  I do not think she needs admission.  She will need evaluation and workup for possible COPD and pulmonary fibrosis.  She will be treated with prednisone and albuterol in the interim while waiting pulmonary evaluation.  Clinical Tests:   Lab Tests: Ordered and Reviewed  Radiological Study: Ordered and Reviewed  Medical Tests: Ordered and Reviewed            Scribe Attestation:   Scribe #1: I performed the  above scribed service and the documentation accurately describes the services I performed. I attest to the accuracy of the note.               Clinical Impression:   The primary encounter diagnosis was Shortness of breath. A diagnosis of Pulmonary fibrosis, unspecified was also pertinent to this visit.              I, Jian Dominguez, personally performed the services described in this documentation. All medical record entries made by the scribe were at my direction and in my presence.  I have reviewed the chart and agree that the record reflects my personal performance and is accurate and complete. Ross Villar MD.  5:58 PM 10/14/2018                 Ross Villar MD  10/14/18 [id number]

## 2018-10-15 ENCOUNTER — TELEPHONE (OUTPATIENT)
Dept: PULMONOLOGY | Facility: CLINIC | Age: 73
End: 2018-10-15

## 2018-10-15 NOTE — TELEPHONE ENCOUNTER
Gave appointment for 10/16/2018 at 1:00pm.      ----- Message from Yoana Temple sent at 10/15/2018  7:16 AM CDT -----  Type:  Sooner Apoointment Request    Caller is requesting a sooner appointment.  Caller declined first available appointment listed below.  Caller will not accept being placed on the waitlist and is requesting a message be sent to doctor.    Name of Caller:  Ariane Plascencia When is the first available appointment?  01/2019  Symptoms:  Copd   Best Call Back Number: [phone]  Additional Information:  Patient was seen at [facility], patient was advised to schedule with the doctor asap.

## 2018-10-16 ENCOUNTER — OFFICE VISIT (OUTPATIENT)
Dept: PULMONOLOGY | Facility: CLINIC | Age: 73
End: 2018-10-16
Payer: MEDICARE

## 2018-10-16 VITALS
OXYGEN SATURATION: 100 % | BODY MASS INDEX: 20.59 KG/M2 | DIASTOLIC BLOOD PRESSURE: 74 MMHG | SYSTOLIC BLOOD PRESSURE: 128 MMHG | HEART RATE: 92 BPM | WEIGHT: 131.19 LBS | HEIGHT: 67 IN

## 2018-10-16 DIAGNOSIS — R09.89 CHRONIC SINUS COMPLAINTS: ICD-10-CM

## 2018-10-16 DIAGNOSIS — R06.09 DOE (DYSPNEA ON EXERTION): ICD-10-CM

## 2018-10-16 DIAGNOSIS — R06.00 DYSPNEA, UNSPECIFIED TYPE: ICD-10-CM

## 2018-10-16 DIAGNOSIS — J45.30 MILD PERSISTENT ASTHMA WITHOUT COMPLICATION: ICD-10-CM

## 2018-10-16 DIAGNOSIS — J45.31 MILD PERSISTENT ASTHMA WITH ACUTE EXACERBATION: Primary | ICD-10-CM

## 2018-10-16 DIAGNOSIS — J84.9 INTERSTITIAL LUNG DISEASE: ICD-10-CM

## 2018-10-16 PROCEDURE — 99214 OFFICE O/P EST MOD 30 MIN: CPT | Mod: PBBFAC,PO | Performed by: INTERNAL MEDICINE

## 2018-10-16 PROCEDURE — 99999 PR PBB SHADOW E&M-EST. PATIENT-LVL IV: CPT | Mod: PBBFAC,,, | Performed by: INTERNAL MEDICINE

## 2018-10-16 PROCEDURE — 99205 OFFICE O/P NEW HI 60 MIN: CPT | Mod: S$PBB,,, | Performed by: INTERNAL MEDICINE

## 2018-10-16 RX ORDER — PREDNISONE 5 MG/1
10 TABLET ORAL 2 TIMES DAILY
Qty: 36 TABLET | Refills: 1 | Status: SHIPPED | OUTPATIENT
Start: 2018-10-16 | End: 2019-04-25 | Stop reason: SDUPTHER

## 2018-10-16 RX ORDER — MONTELUKAST SODIUM 10 MG/1
10 TABLET ORAL NIGHTLY
Qty: 30 TABLET | Refills: 11 | Status: SHIPPED | OUTPATIENT
Start: 2018-10-16 | End: 2019-01-28

## 2018-10-16 RX ORDER — FLUTICASONE FUROATE AND VILANTEROL 200; 25 UG/1; UG/1
1 POWDER RESPIRATORY (INHALATION) DAILY
Qty: 1 EACH | Refills: 11 | Status: SHIPPED | OUTPATIENT
Start: 2018-10-16 | End: 2019-10-22 | Stop reason: SDUPTHER

## 2018-10-16 RX ORDER — FLUTICASONE PROPIONATE 50 MCG
1 SPRAY, SUSPENSION (ML) NASAL DAILY
Qty: 1 BOTTLE | Refills: 11 | Status: SHIPPED | OUTPATIENT
Start: 2018-10-16 | End: 2020-07-21 | Stop reason: SDUPTHER

## 2018-10-16 RX ORDER — ALBUTEROL SULFATE 90 UG/1
AEROSOL, METERED RESPIRATORY (INHALATION)
Qty: 1 INHALER | Refills: 11 | Status: SHIPPED | OUTPATIENT
Start: 2018-10-16 | End: 2018-11-12

## 2018-10-16 NOTE — PROGRESS NOTES
"10/16/2018    Ayla Luigi Dela Cruz  New Patient Consult    Chief Complaint   Patient presents with    Shortness of Breath       HPI: pt having sob.  Pt had asthma as child and outgrew.  Pt has had agarwal 2 yrs, no seasonal variation, no clear nocturnal worsening.  Pt has been on intermittent amiodarone with eval /rx Dr Spaulding.  Pt had a fib resolved.  pft in 2014 with vc 35%.  Pt had 3 ablations. On chr xarelto.  No h/o pe.  No edema.  On aldactone.  Pt had transient right paresis - resolved - tia/stroke.    Pt in er 10/14- no wheezes, place empirically on prednisone 40/d with good response.  With albuterol use once breathing felt nl - 1st within last 1-2 yrs.          The chief compliant  problem is new to me",    PFSH:  Past Medical History:   Diagnosis Date    Anticoagulant long-term use     Anxiety     Arthritis     Atrial fibrillation     Cancer     skin    CHF (congestive heart failure)     Depression     DVT (deep venous thrombosis)     GERD (gastroesophageal reflux disease)     Glaucoma (increased eye pressure)     Hyperlipidemia     diet controlled    Hypertension     Interstitial lung disease     Pneumonia 1/31/2014    Stroke 6-3-14    Stroke     TIA (transient ischemic attack)     TIA (transient ischemic attack)          Past Surgical History:   Procedure Laterality Date    2 heart ablations      3 in total    ABLATION N/A 9/7/2017    Performed by Fco Calderon MD at [facility] CATH LAB    bilateral cataracts      BLOCK, NERVE, FACET JOINT, MEDIAL BRANCH, CERVICAL Right 6/7/2018    [facility]    BLOCK-NERVE-MEDIAL BRANCH-LUMBAR Bilateral 3/6/2018    [facility]    BRONCHOSCOPY N/A 5/1/2014    Performed by Jose Eduardo White MD at [facility] ENDO    CHOLECYSTECTOMY      CHOLECYSTECTOMY- open N/A 8/31/2016    Performed by Lavon Gonsalez MD at [facility] OR    COLONOSCOPY N/A 1/19/2017    Procedure: COLONOSCOPY and EGD;  Surgeon: Jonathan DUKE" MD Antoine;  Location: [facility] ENDO;  Service: Endoscopy;  Laterality: N/A;    COLONOSCOPY and EGD N/A 1/19/2017    Performed by Jonathan Schultz MD at [facility] ENDO    ESOPHAGOGASTRODUODENOSCOPY (EGD) N/A 1/19/2017    Performed by Jonathan Schultz MD at [facility] ENDO    INJECTION OF ANESTHETIC AGENT AROUND MEDIAL BRANCH NERVES INNERVATING CERVICAL FACET JOINT Right 6/7/2018    Procedure: BLOCK, NERVE, FACET JOINT, MEDIAL BRANCH, CERVICAL;  Surgeon: Galo Clancy MD;  Location: [facility] OR;  Service: Pain Management;  Laterality: Right;  C4, 5, 6    INJECTION-STEROID-EPIDURAL-CERVICAL N/A 4/10/2018    Performed by Galo Clancy MD at [facility] OR    INJECTION-STEROID-EPIDURAL-CERVICAL N/A 1/29/2018    Performed by Galo Clancy MD at [facility] OR    pyloristenosis      RADIOFREQUENCY ABLATION OF LUMBAR MEDIAL BRANCH NERVE AT SINGLE LEVEL Bilateral 9/21/2018    Procedure: RADIOFREQUENCY ABLATION, NERVE, SPINAL, LUMBAR, MEDIAL BRANCH, 1 LEVEL;  Surgeon: Galo Clancy MD;  Location: [facility] OR;  Service: Pain Management;  Laterality: Bilateral;  L3, 4, 5    RADIOFREQUENCY ABLATION, NERVE, SPINAL, LUMBAR, MEDIAL BRANCH, 1 LEVEL Bilateral 9/21/2018    Performed by Galo Clancy MD at [facility] OR    RADIOFREQUENCY THERMAL COAGULATION OF MEDIAL BRANCH OF POSTERIOR RAMUS OF CERVICAL SPINAL NERVE Right 7/3/2018    Procedure: RADIOFREQUENCY THERMAL COAGULATION, NERVE, SPINAL, CERVICAL, MEDIAL BRANCH OF POSTERIOR RAMUS;  Surgeon: Galo Clancy MD;  Location: [facility] OR;  Service: Pain Management;  Laterality: Right;  C4,5,6 - Burned at 80 degrees C. for 75 seconds each site    RADIOFREQUENCY THERMAL COAGULATION, NERVE, SPINAL, CERVICAL, MEDIAL BRANCH OF POSTERIOR RAMUS Right 7/3/2018    Performed by Galo Clancy MD at [facility] OR    RADIOFREQUENCY THERMOCOAGULATION (RFTC)-NERVE-MEDIAN BRANCH-LUMBAR Bilateral 3/12/2018    Performed by Galo Clancy MD at [facility] OR    skin cancer removal       TONSILLECTOMY      TRANSESOPHAGEAL ECHOCARDIOGRAM (FELICIA) N/A 9/7/2017    Performed  by [facility] at [facility] CATH LAB     Social History     Tobacco Use    Smoking status: Never Smoker    Smokeless tobacco: Never Used   Substance Use Topics    Alcohol use: No    Drug use: No     Family History   Problem Relation Age of Onset    Heart disease Father     Ulcers Father     Arthritis Mother     Asthma Mother     Rheum arthritis Mother     Pneumonia Mother     Depression Son     Alzheimer's disease Maternal Uncle     Rheum arthritis Maternal Grandmother     Emphysema Maternal Grandfather     Cancer Maternal Grandfather         kidney    Kidney disease Maternal Grandfather     Cancer Paternal Grandmother         lung= smoker    Pneumonia Paternal Grandfather     Breast cancer Neg Hx     Ovarian cancer Neg Hx      Review of patient's allergies indicates:   Allergen Reactions    Atorvastatin      Other reaction(s): Joint pain    Augmentin [amoxicillin-pot clavulanate]      Other reaction(s): Mental Status Change    Baclofen (bulk) Nausea And Vomiting    Ciprofloxacin (bulk)     Decongest tabs      Other reaction(s): increased heart rate    Erythromycin Other (See Comments)    Flecainide Hives     And SOB. No reaction to Lidocaine     Fluoxetine      Other reaction(s): heart palpitations  Other reaction(s): anxiety    Lisinopril Other (See Comments)     cough    Losartan Other (See Comments)     Hypotension with lightheadedness    Morphine Other (See Comments)     confusion    Tramadol Other (See Comments)     SOB, low BP    Venlafaxine analogues      Changes in BP and increases heart rate       Afrin [oxymetazoline] Palpitations    Caffeine Palpitations    Tizanidine Anxiety     dizziness       Performance Status:The patient's activity level is functions out of house.      Review of Systems:  a review of eleven systems covering constitutional, Eye, HEENT, Psych, Respiratory, Cardiac, GI, , Musculoskeletal, Endocrine, Dermatologic was negative except for pertinent  "findings as listed ABOVE and below: chr sinus     Exam:Comprehensive exam done. /74 (BP Location: Left arm, Patient Position: Sitting)   Pulse 92   Ht 5' 7" (1.702 m)   Wt 59.5 kg (131 lb 2.8 oz)   SpO2 100% Comment: on room air  BMI 20.54 kg/m²   Exam included Vitals as listed, and patient's appearance and affect and alertness and mood, oral exam for yeast and hygiene and pharynx lesions and Mallapatti (M) score, neck with inspection for jvd and masses and thyroid abnormalities and lymph nodes (supraclavicular and infraclavicular nodes and axillary also examined and noted if abn), chest exam included symmetry and effort and fremitus and percussion and auscultation, cardiac exam included rhythm and gallops and murmur and rubs and jvd and edema, abdominal exam for mass and hepatosplenomegaly and tenderness and hernias and bowel sounds, Musculoskeletal exam with muscle tone and posture and mobility/gait and  strength, and skin for rashes and cyanosis and pallor and turgor, extremity for clubbing.  Findings were normal except for pertinent findings listed below:  M2, chest is symmetric, no distress, normal percussion, normal fremitus and good normal breath sounds      Radiographs (ct chest and cxr) reviewed: view by direct vision  Geographic gg changes 8/2017,     Labs reviewed           PFT results reviewed  vc 35%      Plan:  Clinical impression is apparently straight forward and impression with management as below.    Ayla was seen today for shortness of breath.    Diagnoses and all orders for this visit:    Mild persistent asthma with acute exacerbation  -     montelukast (SINGULAIR) 10 mg tablet; Take 1 tablet (10 mg total) by mouth every evening.  -     fluticasone-vilanterol (BREO ELLIPTA) 200-25 mcg/dose DsDv diskus inhaler; Inhale 1 puff into the lungs once daily.  -     predniSONE (DELTASONE) 5 MG tablet; Take 2 tablets (10 mg total) by mouth 2 (two) times daily. may repeat for shortness of " breath.  -     albuterol (PROVENTIL/VENTOLIN HFA) 90 mcg/actuation inhaler; 1 puffs every 4 hours as needed for cough, wheeze, or shortness of breath  -     Complete PFT without bronchodilator; Future    Chronic sinus complaints  -     montelukast (SINGULAIR) 10 mg tablet; Take 1 tablet (10 mg total) by mouth every evening.  -     fluticasone (FLONASE) 50 mcg/actuation nasal spray; 1 spray (50 mcg total) by Each Nare route once daily.    Dyspnea, unspecified type        Follow-up in about 3 months (around 1/16/2019), or if symptoms worsen or fail to improve.    Discussed with patient above for education the following:      Patient Instructions   Chr sinus drip - may use flonase 1-2  Each side - daily best.    Use trial singulair - should help - usually helps sinus and lung    Use breo daily - 24 hr inhaled steroid  And 24 rescue medication.    May use albuterol 1 every 4 as needed.  May use prednisone if not great control and effective response to prednisone.    Re check lung capacity once clear.    May use prednisone - as low as able- 5 - 10- 20 daily for 3 and repeat if breathing bad, note response.

## 2018-10-16 NOTE — PATIENT INSTRUCTIONS
Chr sinus drip - may use flonase 1-2  Each side - daily best.    Use trial singulair - should help - usually helps sinus and lung    Use breo daily - 24 hr inhaled steroid  And 24 rescue medication.    May use albuterol 1 every 4 as needed.  May use prednisone if not great control and effective response to prednisone.    Re check lung capacity once clear.    May use prednisone - as low as able- 5 - 10- 20 daily for 3 and repeat if breathing bad, note response.

## 2018-10-19 ENCOUNTER — CLINICAL SUPPORT (OUTPATIENT)
Dept: CARDIOLOGY | Facility: CLINIC | Age: 73
End: 2018-10-19
Attending: INTERNAL MEDICINE
Payer: MEDICARE

## 2018-10-19 DIAGNOSIS — I48.92 ATRIAL FLUTTER, UNSPECIFIED TYPE: ICD-10-CM

## 2018-10-19 DIAGNOSIS — I48.0 PAF (PAROXYSMAL ATRIAL FIBRILLATION): ICD-10-CM

## 2018-10-19 PROCEDURE — 93226 XTRNL ECG REC<48 HR SCAN A/R: CPT | Mod: PBBFAC,PO | Performed by: INTERNAL MEDICINE

## 2018-10-19 PROCEDURE — 93227 XTRNL ECG REC<48 HR R&I: CPT | Mod: S$PBB,,, | Performed by: INTERNAL MEDICINE

## 2018-10-21 ENCOUNTER — HOSPITAL ENCOUNTER (EMERGENCY)
Facility: HOSPITAL | Age: 73
Discharge: HOME | End: 2018-10-21
Attending: EMERGENCY MEDICINE
Payer: MEDICARE

## 2018-10-21 VITALS
SYSTOLIC BLOOD PRESSURE: 100 MMHG | HEIGHT: 67 IN | RESPIRATION RATE: 18 BRPM | WEIGHT: 131 LBS | BODY MASS INDEX: 20.56 KG/M2 | HEART RATE: 57 BPM | OXYGEN SATURATION: 97 % | TEMPERATURE: 98 F | DIASTOLIC BLOOD PRESSURE: 55 MMHG

## 2018-10-21 DIAGNOSIS — T14.8XXA FRACTURE: ICD-10-CM

## 2018-10-21 DIAGNOSIS — R55 NEAR SYNCOPE: Primary | ICD-10-CM

## 2018-10-21 DIAGNOSIS — S82.892A ANKLE FRACTURE, LEFT, CLOSED, INITIAL ENCOUNTER: ICD-10-CM

## 2018-10-21 DIAGNOSIS — M25.579 ANKLE PAIN: ICD-10-CM

## 2018-10-21 LAB
ALBUMIN SERPL BCP-MCNC: 2.6 G/DL
ALP SERPL-CCNC: 64 U/L
ALT SERPL W/O P-5'-P-CCNC: 29 U/L
ANION GAP SERPL CALC-SCNC: 7 MMOL/L
AST SERPL-CCNC: 24 U/L
BACTERIA #/AREA URNS HPF: ABNORMAL /HPF
BASOPHILS NFR BLD: 0 %
BILIRUB SERPL-MCNC: 1.1 MG/DL
BILIRUB UR QL STRIP: NEGATIVE
BUN SERPL-MCNC: 20 MG/DL
CALCIUM SERPL-MCNC: 8.9 MG/DL
CHLORIDE SERPL-SCNC: 105 MMOL/L
CLARITY UR: CLEAR
CO2 SERPL-SCNC: 26 MMOL/L
COLOR UR: YELLOW
CREAT SERPL-MCNC: 1.1 MG/DL
DIFFERENTIAL METHOD: ABNORMAL
EOSINOPHIL NFR BLD: 0 %
ERYTHROCYTE [DISTWIDTH] IN BLOOD BY AUTOMATED COUNT: 14.7 %
EST. GFR  (AFRICAN AMERICAN): 57 ML/MIN/1.73 M^2
EST. GFR  (NON AFRICAN AMERICAN): 49 ML/MIN/1.73 M^2
GLUCOSE SERPL-MCNC: 120 MG/DL
GLUCOSE UR QL STRIP: NEGATIVE
HCT VFR BLD AUTO: 36.3 %
HGB BLD-MCNC: 12 G/DL
HGB UR QL STRIP: NEGATIVE
KETONES UR QL STRIP: NEGATIVE
LEUKOCYTE ESTERASE UR QL STRIP: ABNORMAL
LYMPHOCYTES NFR BLD: 4 %
MCH RBC QN AUTO: 32.8 PG
MCHC RBC AUTO-ENTMCNC: 33.2 G/DL
MCV RBC AUTO: 99 FL
MICROSCOPIC COMMENT: ABNORMAL
MONOCYTES NFR BLD: 2 %
NEUTROPHILS NFR BLD: 90 %
NEUTS BAND NFR BLD MANUAL: 4 %
NITRITE UR QL STRIP: NEGATIVE
PH UR STRIP: 6 [PH] (ref 5–8)
PLATELET # BLD AUTO: 416 K/UL
PMV BLD AUTO: 6.3 FL
POTASSIUM SERPL-SCNC: 4.7 MMOL/L
PROT SERPL-MCNC: 5.6 G/DL
PROT UR QL STRIP: NEGATIVE
RBC # BLD AUTO: 3.67 M/UL
RBC #/AREA URNS HPF: 6 /HPF (ref 0–4)
SODIUM SERPL-SCNC: 138 MMOL/L
SP GR UR STRIP: <=1.005 (ref 1–1.03)
SQUAMOUS #/AREA URNS HPF: 1 /HPF
URN SPEC COLLECT METH UR: ABNORMAL
UROBILINOGEN UR STRIP-ACNC: NEGATIVE EU/DL
WBC # BLD AUTO: 17 K/UL
WBC #/AREA URNS HPF: 1 /HPF (ref 0–5)

## 2018-10-21 PROCEDURE — 80053 COMPREHEN METABOLIC PANEL: CPT

## 2018-10-21 PROCEDURE — 81000 URINALYSIS NONAUTO W/SCOPE: CPT

## 2018-10-21 PROCEDURE — 99900035 HC TECH TIME PER 15 MIN (STAT)

## 2018-10-21 PROCEDURE — 36415 COLL VENOUS BLD VENIPUNCTURE: CPT

## 2018-10-21 PROCEDURE — 93005 ELECTROCARDIOGRAM TRACING: CPT

## 2018-10-21 PROCEDURE — 96375 TX/PRO/DX INJ NEW DRUG ADDON: CPT

## 2018-10-21 PROCEDURE — 63600175 PHARM REV CODE 636 W HCPCS: Performed by: EMERGENCY MEDICINE

## 2018-10-21 PROCEDURE — 93010 ELECTROCARDIOGRAM REPORT: CPT | Mod: ,,, | Performed by: INTERNAL MEDICINE

## 2018-10-21 PROCEDURE — 27788 TREATMENT OF ANKLE FRACTURE: CPT | Mod: 59,RT

## 2018-10-21 PROCEDURE — 96361 HYDRATE IV INFUSION ADD-ON: CPT | Mod: 59

## 2018-10-21 PROCEDURE — 96374 THER/PROPH/DIAG INJ IV PUSH: CPT

## 2018-10-21 PROCEDURE — 85007 BL SMEAR W/DIFF WBC COUNT: CPT

## 2018-10-21 PROCEDURE — 85027 COMPLETE CBC AUTOMATED: CPT

## 2018-10-21 PROCEDURE — 94770 HC EXHALED C02 TEST: CPT | Mod: 59

## 2018-10-21 PROCEDURE — 99285 EMERGENCY DEPT VISIT HI MDM: CPT | Mod: 25

## 2018-10-21 PROCEDURE — 25000003 PHARM REV CODE 250: Performed by: EMERGENCY MEDICINE

## 2018-10-21 RX ORDER — KETAMINE HYDROCHLORIDE 10 MG/ML
30 INJECTION, SOLUTION INTRAMUSCULAR; INTRAVENOUS
Status: COMPLETED | OUTPATIENT
Start: 2018-10-21 | End: 2018-10-21

## 2018-10-21 RX ORDER — HYDROCODONE BITARTRATE AND ACETAMINOPHEN 5; 325 MG/1; MG/1
1 TABLET ORAL EVERY 8 HOURS PRN
Qty: 12 TABLET | Refills: 0 | Status: ON HOLD | OUTPATIENT
Start: 2018-10-21 | End: 2018-11-01 | Stop reason: HOSPADM

## 2018-10-21 RX ORDER — ONDANSETRON 2 MG/ML
4 INJECTION INTRAMUSCULAR; INTRAVENOUS
Status: COMPLETED | OUTPATIENT
Start: 2018-10-21 | End: 2018-10-21

## 2018-10-21 RX ORDER — MIDAZOLAM HYDROCHLORIDE 5 MG/ML
1 INJECTION INTRAMUSCULAR; INTRAVENOUS
Status: COMPLETED | OUTPATIENT
Start: 2018-10-21 | End: 2018-10-21

## 2018-10-21 RX ORDER — METOPROLOL TARTRATE 25 MG/1
25 TABLET, FILM COATED ORAL 2 TIMES DAILY
Qty: 60 TABLET | Refills: 0 | Status: SHIPPED | OUTPATIENT
Start: 2018-10-21 | End: 2018-12-14 | Stop reason: SDUPTHER

## 2018-10-21 RX ORDER — FENTANYL CITRATE 50 UG/ML
25 INJECTION, SOLUTION INTRAMUSCULAR; INTRAVENOUS
Status: COMPLETED | OUTPATIENT
Start: 2018-10-21 | End: 2018-10-21

## 2018-10-21 RX ADMIN — ONDANSETRON 4 MG: 2 INJECTION INTRAMUSCULAR; INTRAVENOUS at 11:10

## 2018-10-21 RX ADMIN — SODIUM CHLORIDE 1000 ML: 0.9 INJECTION, SOLUTION INTRAVENOUS at 11:10

## 2018-10-21 RX ADMIN — KETAMINE HYDROCHLORIDE 30 MG: 10 INJECTION, SOLUTION INTRAMUSCULAR; INTRAVENOUS at 12:10

## 2018-10-21 RX ADMIN — FENTANYL CITRATE 25 MCG: 50 INJECTION INTRAMUSCULAR; INTRAVENOUS at 11:10

## 2018-10-21 RX ADMIN — MIDAZOLAM HYDROCHLORIDE 1 MG: 5 INJECTION, SOLUTION INTRAMUSCULAR; INTRAVENOUS at 12:10

## 2018-10-21 NOTE — ED NOTES
S/P stirrup & short leg posterior splint per ER MD with aid of midlevel provider. Eye opening & brief verbal responses before promptly returning back to sleep.

## 2018-10-21 NOTE — ED NOTES
S/P consents signed prior to procedural sedation for redux of Rt ankle @ bedside per ER MD. ER MD, resp staff & RNs x 2 @ bedside. O2/suction/ambu-bag/IVF bolus @ ready. Monitor in use

## 2018-10-21 NOTE — ED NOTES
"Ice pack to Rt ankle per EMS -- indicates overwhelming weakness that caused her to fall in [employer] injuring her Rt ankle (denies "passing out" but unable to control fall)  "

## 2018-10-21 NOTE — ED PROVIDER NOTES
"Encounter Date: 10/21/2018    SCRIBE #1 NOTE: I, Galo Zurita, am scribing for, and in the presence of, Dr. Coburn.       History     Chief Complaint   Patient presents with    Ankle Pain     fall at [facility]    near syncope     while standing at [facility]     Time seen by provider: 11:12 AM on 10/21/2018      Ayla Dela Cruz is a 73 y.o. female with a PMHx of A-Fib (had ablation), CHF, HTN, and CVA who presents to the ED via EMS for further evaluation of lightheadedness that started < 1 hour PTA. According to the patient she was standing for (5-10 minutes), while waiting for her  outside the [facility] bathroom, when she became had a "cold feeling", then lightheaded and weak. She states that she then fell to the ground twisting her right ankle in the process. She states that she was flat on the ground, but denies LOC and striking her head. Patient admits that she has pain to the right ankle that she rates as a 8/10 currently. She endorses swelling and bruising to the ankle. The patient denies numbness, tingling, knee pain, and hip pain. She does admit that she was seen in [facility] 7 days ago for SOB, but had a follow-up with Dr. Bryson and is now improving. She does admit to starting a new inhaler. The patient denies any other recent medication changes. Patient denies SOB, chest pain, abdominal pain, palpations, back pain, neck pain, nausea, vomiting, and headache. She has allergies to morphine, tramadol, and lisinopril. Patient has no pertinent social history.       The history is provided by the patient.     Review of patient's allergies indicates:   Allergen Reactions    Atorvastatin      Other reaction(s): Joint pain    Augmentin [amoxicillin-pot clavulanate]      Other reaction(s): Mental Status Change    Baclofen (bulk) Nausea And Vomiting    Ciprofloxacin (bulk)     Decongest tabs      Other reaction(s): increased heart rate    Erythromycin Other (See Comments)    Flecainide Hives     And SOB. No " reaction to Lidocaine     Fluoxetine      Other reaction(s): heart palpitations  Other reaction(s): anxiety    Lisinopril Other (See Comments)     cough    Losartan Other (See Comments)     Hypotension with lightheadedness    Morphine Other (See Comments)     confusion    Tramadol Other (See Comments)     SOB, low BP    Venlafaxine analogues      Changes in BP and increases heart rate       Afrin [oxymetazoline] Palpitations    Caffeine Palpitations    Tizanidine Anxiety     dizziness     Past Medical History:   Diagnosis Date    Anticoagulant long-term use     Anxiety     Arthritis     Atrial fibrillation     Cancer     skin    CHF (congestive heart failure)     Depression     DVT (deep venous thrombosis)     GERD (gastroesophageal reflux disease)     Glaucoma (increased eye pressure)     Hyperlipidemia     diet controlled    Hypertension     Interstitial lung disease     Pneumonia 1/31/2014    Stroke 6-3-14    Stroke     TIA (transient ischemic attack)     TIA (transient ischemic attack)      Past Surgical History:   Procedure Laterality Date    2 heart ablations      3 in total    ABLATION N/A 9/7/2017    Performed by [facility] at [facility] CATH LAB    bilateral cataracts      BLOCK, NERVE, FACET JOINT, MEDIAL BRANCH, CERVICAL Right 6/7/2018    [facility]    BLOCK-NERVE-MEDIAL BRANCH-LUMBAR Bilateral 3/6/2018    [facility] at [facility] OR    BRONCHOSCOPY N/A 5/1/2014    Performed by Jose Eduardo White MD at [facility] ENDO    CHOLECYSTECTOMY      CHOLECYSTECTOMY- open N/A 8/31/2016    Performed by Lavon Gonsalez MD at [facility] OR    COLONOSCOPY N/A 1/19/2017    Procedure: COLONOSCOPY and EGD;  Surgeon: Jonathan Schultz MD;  Location: [facility];  Service: Endoscopy;  Laterality: N/A;    COLONOSCOPY and EGD N/A 1/19/2017    Performed by Jonathan Schultz MD at [facility] ENDO    ESOPHAGOGASTRODUODENOSCOPY (EGD) N/A 1/19/2017    Performed by Jonathan DUKE  MD Antoine at [facility] ENDO    INJECTION OF ANESTHETIC AGENT AROUND MEDIAL BRANCH NERVES INNERVATING CERVICAL FACET JOINT Right 6/7/2018    Procedure: BLOCK, NERVE, FACET JOINT, MEDIAL BRANCH, CERVICAL;  Surgeon: Galo Clancy MD;  [facility]: [facility] OR;  Service: Pain Management;  Laterality: Right;  C4, 5, 6    INJECTION-STEROID-EPIDURAL-CERVICAL N/A 4/10/2018    Performed by Galo Clancy MD at [facility] OR    INJECTION-STEROID-EPIDURAL-CERVICAL N/A 1/29/2018    Performed by Galo Clancy MD at [facility]    pyloristenosis      RADIOFREQUENCY ABLATION OF LUMBAR MEDIAL BRANCH NERVE AT SINGLE LEVEL Bilateral 9/21/2018    Procedure: RADIOFREQUENCY ABLATION, NERVE, SPINAL, LUMBAR, MEDIAL BRANCH, 1 LEVEL;  Surgeon: Galo Clancy MD;  Location: [facility] OR;  Service: Pain Management;  Laterality: Bilateral;  L3, 4, 5    RADIOFREQUENCY ABLATION, NERVE, SPINAL, LUMBAR, MEDIAL BRANCH, 1 LEVEL Bilateral 9/21/2018    Performed by Galo Clancy MD at [facility] OR    RADIOFREQUENCY THERMAL COAGULATION OF MEDIAL BRANCH OF POSTERIOR RAMUS OF CERVICAL SPINAL NERVE Right 7/3/2018    Procedure: RADIOFREQUENCY THERMAL COAGULATION, NERVE, SPINAL, CERVICAL, MEDIAL BRANCH OF POSTERIOR RAMUS;  Surgeon: Galo Clancy MD;  Location: [facility] OR;  Service: Pain Management;  Laterality: Right;  C4,5,6 - Burned at 80 degrees C. for 75 seconds each site    RADIOFREQUENCY THERMAL COAGULATION, NERVE, SPINAL, CERVICAL, MEDIAL BRANCH OF POSTERIOR RAMUS Right 7/3/2018    Performed by Galo Clancy MD at [facility] OR    RADIOFREQUENCY THERMOCOAGULATION (RFTC)-NERVE-MEDIAN BRANCH-LUMBAR Bilateral 3/12/2018    Performed by Galo Clancy MD at [facility] OR    skin cancer removal       TONSILLECTOMY      TRANSESOPHAGEAL ECHOCARDIOGRAM (FELICIA) N/A 9/7/2017    Performed by Fco Calderon [facility] at [facility] CATH LAB     Family History   Problem Relation Age of Onset    Heart disease Father     Ulcers Father     Arthritis Mother     Asthma Mother     Rheum arthritis Mother     Pneumonia Mother      Depression Son     Alzheimer's disease Maternal Uncle     Rheum arthritis Maternal Grandmother     Emphysema Maternal Grandfather     Cancer Maternal Grandfather         kidney    Kidney disease Maternal Grandfather     Cancer Paternal Grandmother         lung= smoker    Pneumonia Paternal Grandfather     Breast cancer Neg Hx     Ovarian cancer Neg Hx      Social History     Tobacco Use    Smoking status: Never Smoker    Smokeless tobacco: Never Used   Substance Use Topics    Alcohol use: No    Drug use: No     Review of Systems  REVIEW OF SYSTEMS:  CONSTITUTIONAL: no fevers, chills, appetite changes, weight changes  ENT: no sore throat, congestion, hearing deficitis, or ear pain  EYES: no blurred vision, eye pain,   Neck: no pain, stiffness   CV: no chest pain, edema, palpitations, or chest wall pain  RESP: no shortness of breath, wheezing, dyspnea on exertion, orthopnea, or cough  GI: no abdominal pain, nausea, vomiting, diarrhea, or bloody stools  : no dysuria, hematuria, discharge,   MUSCULOSKELETAL: no myalgias, back pain, back pain, and neck pain. Positive for arthralgia and joint swelling.   SKIN: no rashes or lesions. Positive color change.  NEURO: no numbness, weakness, headaches, syncope      Physical Exam     Initial Vitals [10/21/18 1108]   BP Pulse Resp Temp SpO2   (!) 108/54 68 18 98 °F (36.7 °C) 95 %      MAP       --         Physical Exam    Nursing note and vitals reviewed.  Constitutional: She appears well-developed.   HENT:   Head: Normocephalic and atraumatic.   Eyes: EOM are normal. Pupils are equal, round, and reactive to light.   Irregular lense to the right lenses    Neck: Neck supple.   Cardiovascular: Normal rate, regular rhythm and intact distal pulses. Exam reveals no gallop and no friction rub.    Murmur heard.   Systolic murmur is present with a grade of 3/6.  Pulses:       Dorsalis pedis pulses are 2+ on the right side, and 2+ on the left side.   Murmur heard best at the  left upper sternal boarder.    Pulmonary/Chest: Breath sounds normal. No respiratory distress. She has no decreased breath sounds. She has no wheezes. She has no rhonchi. She has no rales.   Abdominal: Soft. Bowel sounds are normal. She exhibits no distension. There is no tenderness.   Musculoskeletal: Normal range of motion. She exhibits tenderness.        Right hip: Normal.        Right knee: Normal.        Right ankle: She exhibits deformity. Tenderness.        Right foot: There is no tenderness.   defomirity to the right ankle with bulging of the medial malleolus with associated swelling and ecchymosis.   No tenderness to the proximal tib or fib   Neurological: She is alert and oriented to person, place, and time. She has normal strength.   Good strength bilaterally   Skin: Skin is warm and dry. Ecchymosis noted.   Psychiatric: She has a normal mood and affect.         ED Course   Procedural Sedation  Date/Time: 10/21/2018 12:15 PM  Performed by: Galo Coburn MD  Authorized by: Galo Coburn MD   ASA Class: Class 3 - Systemic Illness with functional impairment.  Mallampati Score: Class 1 - Visualization of the soft palate, fauces, uvula, and anterior/posterior pillars.   NPO STATUS:  Date/Time of last solid: 10/21/2018 7:30 AM  Date/Time of last fluid: 10/21/2018 7:30 AM  Equipment: on cardiac monitor., on BP monitor., on CO2 monitor., on supplemental oxygen., suction available. and airway equipment available.   Sedation type: deep sedation  (See MAR for exact dosages of medications).  Sedatives: ketamine and midazolam  Analgesia: ketamine  Patient/Family history of anesthesia or sedation complications: No      Labs Reviewed - No data to display  EKG Readings: (Independently Interpreted)   Rhythm: Sinus Bradycardia. Heart Rate: 57. Axis: Left Axis Deviation.   PVC's with supraventricular complex. Normal intervals. No ST segment elevation or depression.        Imaging Results    None       X-Rays:    Independently Interpreted Readings:   Other Readings:  Initial reading on the right ankle x-ray shows a santos B fracture  Chest x-ray: chronic bibasilar interstitial prominence. No pneumonia or CHF. Unchanged from prior.     Medical Decision Making:   History:   Old Medical Records: I decided to obtain old medical records.  Clinical Tests:   Lab Tests: Reviewed and Ordered  Radiological Study: Ordered and Reviewed  Patient was recently seen for mild persistent asthma with an acute exacerbation. The patients recent SOB ethology is unclear, however could be attributed to amiodarone. The patient has recently seen her pulmonologist, who started the patient on prednisone, Singulair, and Breo. Patient is on Xarelto from a prior stroke. She does not have a history of pulmonary embolisms.       APC / Resident Notes:   I assisted Dr. Coburn with reduction of patient's lateral malleolus fracture. I then assisted with splint placement. Post reduction xray reviewed by myself and Dr. Coburn. Patient's initial assessment, conscious sedation/recovery, and discharge were performed by Dr. Coburn.        Scribe Attestation:   Scribe #1: I performed the above scribed service and the documentation accurately describes the services I performed. I attest to the accuracy of the note.    I, Galo Coburn MD, personally performed the services described in this documentation. All medical record entries made by the scribe were at my direction and in my presence.  I have reviewed the chart and agree that the record reflects my personal performance and is accurate and complete. Galo Coburn MD.  6:29 PM 10/21/2018             Clinical Impression:   The primary encounter diagnosis was Near syncope. Diagnoses of Fracture, Ankle pain, and Ankle fracture, left, closed, initial encounter were also pertinent to this visit.                             Marlene Yadav NP  10/21/18 7912

## 2018-10-21 NOTE — PLAN OF CARE
10/21/18 1306   Patient Assessment/Suction   Level of Consciousness (AVPU) alert   PRE-TX-O2-ETCO2   SpO2 100 %   ETCO2 (mmHg) 33 mmHg   $ ETCO2 Charge Exhaled CO2 Monitoring   $ ETCO2 Usage Currently wearing   Pulse (!) 58   /63   Conscious sedation, RT at bedside with ET CO2 monitoring and o2 @ 2lpm nc. Suction is set up with ambu bag and flowmeter at [facility]. Pt sats remained 100%. Pt now awake and talking.

## 2018-10-22 ENCOUNTER — PATIENT MESSAGE (OUTPATIENT)
Dept: PAIN MEDICINE | Facility: CLINIC | Age: 73
End: 2018-10-22

## 2018-10-22 ENCOUNTER — PES CALL (OUTPATIENT)
Dept: ADMINISTRATIVE | Facility: CLINIC | Age: 73
End: 2018-10-22

## 2018-10-23 ENCOUNTER — HOSPITAL ENCOUNTER (OUTPATIENT)
Dept: RADIOLOGY | Facility: HOSPITAL | Age: 73
Discharge: HOME | End: 2018-10-23
Attending: PHYSICIAN ASSISTANT
Payer: MEDICARE

## 2018-10-23 ENCOUNTER — OFFICE VISIT (OUTPATIENT)
Dept: ORTHOPEDICS | Facility: CLINIC | Age: 73
End: 2018-10-23
Payer: MEDICARE

## 2018-10-23 ENCOUNTER — OFFICE VISIT (OUTPATIENT)
Dept: PAIN MEDICINE | Facility: CLINIC | Age: 73
End: 2018-10-23
Payer: MEDICARE

## 2018-10-23 ENCOUNTER — TELEPHONE (OUTPATIENT)
Dept: CARDIOLOGY | Facility: CLINIC | Age: 73
End: 2018-10-23

## 2018-10-23 VITALS
BODY MASS INDEX: 20.56 KG/M2 | HEIGHT: 67 IN | DIASTOLIC BLOOD PRESSURE: 64 MMHG | SYSTOLIC BLOOD PRESSURE: 117 MMHG | WEIGHT: 131 LBS | HEART RATE: 68 BPM

## 2018-10-23 VITALS
SYSTOLIC BLOOD PRESSURE: 123 MMHG | BODY MASS INDEX: 20.55 KG/M2 | DIASTOLIC BLOOD PRESSURE: 60 MMHG | WEIGHT: 130.94 LBS | HEIGHT: 67 IN | HEART RATE: 68 BPM

## 2018-10-23 DIAGNOSIS — Z01.818 PRE-OP EXAM: Primary | ICD-10-CM

## 2018-10-23 DIAGNOSIS — S82.61XA CLOSED DISPLACED FRACTURE OF LATERAL MALLEOLUS OF RIGHT FIBULA, INITIAL ENCOUNTER: ICD-10-CM

## 2018-10-23 DIAGNOSIS — S82.61XA FRACTURE OF RIGHT ANKLE, LATERAL MALLEOLUS: ICD-10-CM

## 2018-10-23 DIAGNOSIS — M47.892 OTHER SPONDYLOSIS, CERVICAL REGION: ICD-10-CM

## 2018-10-23 DIAGNOSIS — M25.511 RIGHT SHOULDER PAIN, UNSPECIFIED CHRONICITY: ICD-10-CM

## 2018-10-23 DIAGNOSIS — M25.511 RIGHT SHOULDER PAIN, UNSPECIFIED CHRONICITY: Primary | ICD-10-CM

## 2018-10-23 DIAGNOSIS — M47.896 OTHER SPONDYLOSIS, LUMBAR REGION: ICD-10-CM

## 2018-10-23 DIAGNOSIS — M50.30 DDD (DEGENERATIVE DISC DISEASE), CERVICAL: ICD-10-CM

## 2018-10-23 PROCEDURE — 99999 PR PBB SHADOW E&M-EST. PATIENT-LVL IV: CPT | Mod: PBBFAC,,, | Performed by: ORTHOPAEDIC SURGERY

## 2018-10-23 PROCEDURE — 99215 OFFICE O/P EST HI 40 MIN: CPT | Mod: PBBFAC,PN | Performed by: PHYSICIAN ASSISTANT

## 2018-10-23 PROCEDURE — 73030 X-RAY EXAM OF SHOULDER: CPT | Mod: 26,RT,, | Performed by: RADIOLOGY

## 2018-10-23 PROCEDURE — 99213 OFFICE O/P EST LOW 20 MIN: CPT | Mod: S$PBB,,, | Performed by: PHYSICIAN ASSISTANT

## 2018-10-23 PROCEDURE — 99214 OFFICE O/P EST MOD 30 MIN: CPT | Mod: PBBFAC,25,27,PN | Performed by: ORTHOPAEDIC SURGERY

## 2018-10-23 PROCEDURE — 73030 X-RAY EXAM OF SHOULDER: CPT | Mod: TC,FY,RT

## 2018-10-23 PROCEDURE — 99214 OFFICE O/P EST MOD 30 MIN: CPT | Mod: S$PBB,,, | Performed by: ORTHOPAEDIC SURGERY

## 2018-10-23 PROCEDURE — 99999 PR PBB SHADOW E&M-EST. PATIENT-LVL V: CPT | Mod: PBBFAC,,, | Performed by: PHYSICIAN ASSISTANT

## 2018-10-23 NOTE — LETTER
October 25, 2018      [address] [address]           [facility] [address]  Phone: [phone]          Patient: Ayla Dela Cruz   MR Number: [id number]   YOB: 1945   Date of Visit: 10/23/2018       Dear [facility]:    Thank you for referring Ayla Dela Cruz to me for evaluation. Attached you will find relevant portions of my assessment and plan of care.    If you have questions, please do not hesitate to call me. I look forward to following Ayla Dela Cruz along with you.    Sincerely,    Wilfreod Aguero MD    Enclosure  CC:  No Recipients    If you would like to receive this communication electronically, please contact [email or web address] or [phone] to request more information on [employer] Link access.    For providers and/or their staff who would like to refer a patient to [facility], please contact us through our one-stop-shop provider referral line, [facility], at [phone].    If you feel you have received this communication in error or would no longer like to receive these types of communications, please e-mail [email or web address]

## 2018-10-23 NOTE — TELEPHONE ENCOUNTER
----- Message from Lindy Larkin LPN sent at 10/23/2018  2:54 PM CDT -----  Pt has right lateral malleolus fracture requiring surgical fixation.  Dr. Aguero would like to perform her surgery 11/1/18, but we will need cardiology clearance to do so.  Can clearance be given from her last appt, or will she need to be seen again?    Please advise,  Lindy

## 2018-10-23 NOTE — PROGRESS NOTES
Referring Physician: No ref. provider found    PCP: Jan Olivo MD    CC:  R shoulder and neck pain    Interval History:  Mrs. Dela Cruz is a 73 y.o. female with neck pain who presents for evaluation of right shoulder pain. Reports pain with overhead activity. Internal and external rotation is not limited. She had a syncope episode on Sunday that resulted in a right ankle fracture. She is being evaluated by Dr. Aguero today. She is s/p Cervical MB RFA on right at C4, 5, 6 that provided some relief.  Cervical CHRISTOPHER at C7-T1 provided 100% relief for 3 weeks.  No upper extremity sensory or motor deficits. The pt has made no recent changes to her medication regimen. No recent trauma or stimulus for symptom presentation.  Pain today is rated 6/10.      Prior HPI:   Ayla Dela Cruz is a 73 y.o. female who presents as a new patient from Dr. Grubbs for 2.5 month history of R shoulder/mid back pain. The pain first began when she lifted some books at a bookstore in early November. She began feeling the pain in her R lateral neck/suprascapular region, with radiation down the back/side of her arm to the top of the elbow. She was treated with some topical creams, chiropractor adjustments, and then was placed on gabapentin by Dr. Grubbs, which has helped her pain a considerable amount. She states that the pain is intermittent, aching, sharp, shooting & radiates to her elbow. Worsened by sitting, or lying in bed. 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     ROS:  CONSTITUTIONAL: No fevers, chills, night sweats, wt. loss, appetite changes  SKIN: no rashes or itching  ENT: No headaches, head trauma, vision changes, or eye pain  LYMPH NODES: None noticed   CV: No chest pain, palpitations.   RESP: No shortness of breath, dyspnea on exertion, cough, wheezing, or hemoptysis  GI: No nausea, emesis, diarrhea, constipation, melena, hematochezia, pain.    : No dysuria, hematuria, urgency, or frequency   HEME: No easy bruising,  bleeding problems  PSYCHIATRIC: No anxiety, psychosis, hallucinations. +ve depression  NEURO: No seizures, memory loss, dizziness, +ve difficulty sleeping  MSK: +HPI      Past Medical History:   Diagnosis Date    Anticoagulant long-term use     Anxiety     Arthritis     Atrial fibrillation     Cancer     skin    CHF (congestive heart failure)     Depression     DVT (deep venous thrombosis)     GERD (gastroesophageal reflux disease)     Glaucoma (increased eye pressure)     Hyperlipidemia     diet controlled    Hypertension     Interstitial lung disease     Pneumonia 1/31/2014    Stroke 6-3-14    Stroke     TIA (transient ischemic attack)     TIA (transient ischemic attack)      Past Surgical History:   Procedure Laterality Date    2 heart ablations      3 in total    ABLATION N/A 9/7/2017    [facility] C[facility] LAB    bilateral cataracts      BLOCK, NERVE, FACET JOINT, MEDIAL BRANCH, CERVICAL Right 6/7/2018    Performed by Galo Clancy [facility] at [facility] OR    BLOCK-NERVE-MEDIAL BRANCH-LUMBAR Bilateral 3/6/2018    Performed by [facility] Cl[facility] at [facility] OR    BRONCHOSCOPY N/A 5/1/2014    Performed by Jose Eduardo White MD at [facility] ENDO    CHOLECYSTECTOMY      CHOLECYSTECTOMY- open N/A 8/31/2016    Performed by Lavon Gonsalez MD at [facility] OR    COLONOSCOPY N/A 1/19/2017    Procedure: COLONOSCOPY and EGD;  Surgeon: Jonathan Schultz MD;  Location: [facility] ENDO;  Service: Endoscopy;  Laterality: N/A;    COLONOSCOPY and EGD N/A 1/19/2017    Performed by Jonathan Schultz MD at [facility] ENDO    ESOPHAGOGASTRODUODENOSCOPY (EGD) N/A 1/19/2017    Performed by Jonathan Schultz MD at [facility] ENDO    INJECTION OF ANESTHETIC AGENT AROUND MEDIAL BRANCH NERVES INNERVATING CERVICAL FACET JOINT Right 6/7/2018    Procedure: BLOCK, NERVE, FACET JOINT, MEDIAL BRANCH, CERVICAL;  Surgeon: Galo Clancy MD;  Location: [facility] OR;  Service: Pain Management;  Laterality: Right;  C4, 5, 6     INJECTION-STEROID-EPIDURAL-CERVICAL N/A 4/10/2018    Performed by [facility] Clancy MD at [facility] OR    INJECTION-STEROID-EPIDURAL-CERVICAL N/A 1/29/2018    Performed by Galo Clancy MD at [facility] OR    pyloristenosis      RADIOFREQUENCY ABLATION OF LUMBAR MEDIAL BRANCH NERVE AT SINGLE LEVEL Bilateral 9/21/2018    Procedure: RADIOFREQUENCY ABLATION, NERVE, SPINAL, LUMBAR, MEDIAL BRANCH, 1 LEVEL;  Surgeon: Galo Clancy MD;  Location: [facility] OR;  Service: Pain Management;  Laterality: Bilateral;  L3, 4, 5    RADIOFREQUENCY ABLATION, NERVE, SPINAL, LUMBAR, MEDIAL BRANCH, 1 LEVEL Bilateral 9/21/2018    Performed by Galo Clancy MD at [facility] OR    RADIOFREQUENCY THERMAL COAGULATION OF MEDIAL BRANCH OF POSTERIOR RAMUS OF CERVICAL SPINAL NERVE Right 7/3/2018    Procedure: RADIOFREQUENCY THERMAL COAGULATION, NERVE, SPINAL, CERVICAL, MEDIAL BRANCH OF POSTERIOR RAMUS;  Surgeon: Galo Clancy MD;  Location: [facility] OR;  Service: Pain Management;  Laterality: Right;  C4,5,6 - Burned at 80 degrees C. for 75 seconds each site    RADIOFREQUENCY THERMAL COAGULATION, NERVE, SPINAL, CERVICAL, MEDIAL BRANCH OF POSTERIOR RAMUS Right 7/3/2018    Performed by Galo Clancy MD at [facility] OR    RADIOFREQUENCY THERMOCOAGULATION (RFTC)-NERVE-MEDIAN BRANCH-LUMBAR Bilateral 3/12/2018    Performed by [facility] Clancy MD at [facility] OR    skin cancer removal       TONSILLECTOMY      TRANSESOPHAGEAL ECHOCARDIOGRAM (FELICIA) N/A 9/7/2017    [facility] at [facility] CATH LAB     Family History   Problem Relation Age of Onset    Heart disease Father     Ulcers Father     Arthritis Mother     Asthma Mother     Rheum arthritis Mother     Pneumonia Mother     Depression Son     Alzheimer's disease Maternal Uncle     Rheum arthritis Maternal Grandmother     Emphysema Maternal Grandfather     Cancer Maternal Grandfather         kidney    Kidney disease Maternal Grandfather     Cancer Paternal Grandmother         lung= smoker    Pneumonia Paternal  "Grandfather     Breast cancer Neg Hx     Ovarian cancer Neg Hx      Social History     Socioeconomic History    Marital status:      Spouse name: None    Number of children: None    Years of education: None    Highest education level: None   Social Needs    Financial resource strain: None    Food insecurity - worry: None    Food insecurity - inability: None    Transportation needs - medical: None    Transportation needs - non-medical: None   Occupational History    None   Tobacco Use    Smoking status: Never Smoker    Smokeless tobacco: Never Used   Substance and Sexual Activity    Alcohol use: No    Drug use: No    Sexual activity: Yes     Partners: Male   Other Topics Concern    None   Social History Narrative    None         Medications/Allergies: See med card    Vitals:    10/23/18 1307   BP: 117/64   Pulse: 68   Weight: 59.4 kg (131 lb)   Height: 5' 7" (1.702 m)   PainSc:   6   PainLoc: Back         Physical exam:    GENERAL: A and O x3, the patient appears well groomed and is in no acute distress.  Skin: No rashes or obvious lesions  HEENT: normocephalic, atraumatic  CARDIOVASCULAR:  RRR  LUNGS: non labored breathing  ABDOMEN: soft, nontender   UPPER EXTREMITIES: Normal alignment, normal range of motion, no atrophy, no skin changes,  hair growth and nail growth normal and equal bilaterally. No swelling. Moderate tenderness to AC joint on the right. Pain with flexion at 90 degrees.   LOWER EXTREMITIES:  Normal alignment, normal range of motion, no atrophy, no skin changes,  hair growth and nail growth normal and equal bilaterally. No swelling, no tenderness.  CERVICAL SPINE:  Cervical spine: ROM is full in flexion, extension and lateral rotation with increased pain on the right side with lateral rotation and extension.   Spurling's maneuver causes no neck pain to either side.  Myofascial exam: tender to palpation along suprascapular muscle and anterior pressure of shoulder.  MENTAL " STATUS: normal orientation, speech, language, and fund of knowledge for social situation.  Emotional state appropriate.       CRANIAL NERVES:  II:  PERRL bilaterally,   III,IV,VI: EOMI.    V:  Facial sensation equal bilaterally  VII:  Facial motor function normal.  VIII:  Hearing equal to finger rub bilaterally  IX/X: Gag normal, palate symmetric  XI:  Shoulder shrug equal, head turn equal  XII:  Tongue midline without fasciculations      MOTOR: Tone and bulk: normal bilateral upper and lower Strength: normal   Delt Bi Tri WE WF     R 5 5 5 5 5 5   L 5 5 5 5 5 5     IP ADD ABD Quad TA Gas HAM  R 5 5 5 5 5 5 5  L 5 5 5 5 5 5 5    SENSATION: Light touch and pinprick intact bilaterally  REFLEXES: normal, symmetric, nonbrisk.  Toes down, no clonus. No hoffmans.  GAIT: normal rise, base, steps, and arm swing.        Imaging:  MRI C-spine 12/2017  There is a 2 mm retrolisthesis of C4 on C5 and C6 on C7.  There is reversal of the normal cervical lordosis which could relate to neck muscle spasm.The cervical vertebral bodies show normal signal intensity and height with no indication of acute fracture or pathologic marrow replacement process.    There is degenerative disc desiccation at every cervical level with marginal osteophyte formation and disc space narrowing noted at C3-C4, C4-C5, C5-C6, and to a lesser extent, C6-C7.  There is congenital spinal canal narrowing present.    The cervical cord is normal in signal intensity at all levels with no indication of myelomalacia or cord edema. The incidentally observed soft tissues of the neck show no significant abnormalities.    C2-C3: There is bilateral hypertrophic facet arthropathy.  There is left ligamentum flavum thickening.  No definite acquired central canal or neuroforaminal stenosis.    C3-C4: There is a posterior disc osteophyte complex with a superimposed broad central disc protrusion which flattens the ventral cord.  There is ligamentum flavum thickening,  bilateral uncovertebral spurring, and bilateral facet arthropathy, left greater than right.  There is moderate overall central canal stenosis, moderate-severe left neuroforaminal stenosis, and moderate right neuroforaminal stenosis.    C4-C5: There is a bulging posterior disc osteophyte complex, ligamentum flavum thickening, bilateral uncovertebral spurring, and facet arthropathy, right greater than left.  There is severe bilateral neural foraminal stenosis and moderate overall central canal stenosis.  No abnormal cord signal.  There is flattening of the ventral cord.    C5-C6: There is a posterior disc osteophyte complex and bilateral uncovertebral spurring, right greater than left.  No left neuroforaminal stenosis.  There is mild-moderate right neuroforaminal stenosis and mild-moderate overall central canal stenosis.    C6-C7: There is a 2 mm retrolisthesis of C6 on C7 with uncovering of the intervertebral disc and a superimposed posterior disc osteophyte complex.  There is bilateral uncovertebral spurring, left greater than right.  There is severe left and moderate right neuroforaminal stenosis.  There is ligamentum flavum thickening.  There is moderate central canal stenosis with flattening of the ventral cord.    C7-T1: No central canal or neuroforaminal stenosis. No disc protrusion or extrusion.    Assessment:  Mrs. Dela Cruz is a 72 y.o. female with chronic neck and shoulder pain.     1. Right shoulder pain, unspecified chronicity    2. Other spondylosis, cervical region    3. Other spondylosis, lumbar region    4. DDD (degenerative disc disease), cervical      Plan:  1. Recommend physical therapy and home exercise regimen to improve strength   2. Continue Gabapentin 600 mg daily   3. Right shoulder xray to evaluate. Will consider right shoulder steroid injection.   4. Consider repeat cervical CHRISTOPHER  5. Continue Tramadol 50 mg q 8 h prn   6 Will call with results.

## 2018-10-24 ENCOUNTER — TELEPHONE (OUTPATIENT)
Dept: PAIN MEDICINE | Facility: CLINIC | Age: 73
End: 2018-10-24

## 2018-10-24 ENCOUNTER — TELEPHONE (OUTPATIENT)
Dept: ORTHOPEDICS | Facility: CLINIC | Age: 73
End: 2018-10-24

## 2018-10-24 RX ORDER — SODIUM CHLORIDE 9 MG/ML
INJECTION, SOLUTION INTRAVENOUS CONTINUOUS
Status: CANCELLED | OUTPATIENT
Start: 2018-10-24

## 2018-10-24 RX ORDER — MUPIROCIN 20 MG/G
OINTMENT TOPICAL
Status: CANCELLED | OUTPATIENT
Start: 2018-10-24

## 2018-10-24 RX ORDER — CLINDAMYCIN PHOSPHATE 900 MG/50ML
900 INJECTION, SOLUTION INTRAVENOUS
Status: CANCELLED | OUTPATIENT
Start: 2018-10-24

## 2018-10-24 NOTE — TELEPHONE ENCOUNTER
Xray showed mild degeneration and possible tendinitis that is likely causing her pain. We can schedule for a steroid injection after her ankle been treated if shoulder pain continues

## 2018-10-24 NOTE — TELEPHONE ENCOUNTER
Called pt to advise that we have rcvd clearance from Dr. Jurado and that she will need to hold her Xarelto for 2 days prior to surgery.  Spoke w/ her , he verbalized understanding.

## 2018-10-24 NOTE — TELEPHONE ENCOUNTER
Informed patient of X-ray results and recommendations per VIOLET Diaz. Patient accepted and voiced understanding.

## 2018-10-25 LAB
OHS CV HOLTER LENGTH DECIMAL HOURS: 23.98
OHS CV HOLTER LENGTH HOURS: 23
OHS CV HOLTER LENGTH MINUTES: 59

## 2018-10-25 NOTE — PROGRESS NOTES
Past Medical History:   Diagnosis Date    Anticoagulant long-term use     Anxiety     Arthritis     Atrial fibrillation     Cancer     skin    CHF (congestive heart failure)     Depression     DVT (deep venous thrombosis)     GERD (gastroesophageal reflux disease)     Glaucoma (increased eye pressure)     Hyperlipidemia     diet controlled    Hypertension     Interstitial lung disease     Pneumonia 1/31/2014    Stroke 6-3-14    Stroke     TIA (transient ischemic attack)     TIA (transient ischemic attack)        Past Surgical History:   Procedure Laterality Date    2 heart ablations      3 in total    ABLATION N/A 9/7/2017    Performed by Fco Calderon [facility] at [facility] CATH LAB    bilateral cataracts      BLOCK, NERVE, FACET JOINT, MEDIAL BRANCH, CERVICAL Right 6/7/2018    Performed by Galo Clancy MD at [facility]    BLOCK-NERVE-MEDIAL BRANCH-LUMBAR Bilateral 3/6/2018    Performed by Galo Clancy MD at [facility]    BRONCHOSCOPY N/A 5/1/2014    Performed by Jose Eduardo White MD at [facility] ENDO    CHOLECYSTECTOMY      CHOLECYSTECTOMY- open N/A 8/31/2016    Performed by Lavon Gonsalez MD at [facility] OR    COLONOSCOPY N/A 1/19/2017    Procedure: COLONOSCOPY and EGD;  Surgeon: Jonathan Schultz MD;  Location: [facility] ENDO;  Service: Endoscopy;  Laterality: N/A;    COLONOSCOPY and EGD N/A 1/19/2017    [facility]    ESOPHAGOGASTRODUODENOSCOPY (EGD) N/A 1/19/2017    Performed by Jonathan Schultz MD at [facility] ENDO    INJECTION OF ANESTHETIC AGENT AROUND MEDIAL BRANCH NERVES INNERVATING CERVICAL FACET JOINT Right 6/7/2018    Procedure: BLOCK, NERVE, FACET JOINT, MEDIAL BRANCH, CERVICAL;  Surgeon: Galo Clancy MD;  Location: [facility] OR;  Service: Pain Management;  Laterality: Right;  C4, 5, 6    INJECTION-STEROID-EPIDURAL-CERVICAL N/A 4/10/2018    Performed by Galo Clancy MD at [facility]    INJECTION-STEROID-EPIDURAL-CERVICAL N/A 1/29/2018    Performed by Galo Clancy MD at [facility]     pyloristenosis      RADIOFREQUENCY ABLATION OF LUMBAR MEDIAL BRANCH NERVE AT SINGLE LEVEL Bilateral 9/21/2018    Procedure: RADIOFREQUENCY ABLATION, NERVE, SPINAL, LUMBAR, MEDIAL BRANCH, 1 LEVEL;  Surgeon: Galo Clancy MD;  Location: [facility] OR;  Service: Pain Management;  Laterality: Bilateral;  L3, 4, 5    RADIOFREQUENCY ABLATION, NERVE, SPINAL, LUMBAR, MEDIAL BRANCH, 1 LEVEL Bilateral 9/21/2018    Performed by Galo Clancy MD at [facility] OR    RADIOFREQUENCY THERMAL COAGULATION OF MEDIAL BRANCH OF POSTERIOR RAMUS OF CERVICAL SPINAL NERVE Right 7/3/2018    Procedure: RADIOFREQUENCY THERMAL COAGULATION, NERVE, SPINAL, CERVICAL, MEDIAL BRANCH OF POSTERIOR RAMUS;  Surgeon: Galo Clancy MD;  Location: [facility] OR;  Service: Pain Management;  Laterality: Right;  C4,5,6 - Burned at 80 degrees C. for 75 seconds each site    RADIOFREQUENCY THERMAL COAGULATION, NERVE, SPINAL, CERVICAL, MEDIAL BRANCH OF POSTERIOR RAMUS Right 7/3/2018    Performed by Galo Clancy MD at [facility] OR    RADIOFREQUENCY THERMOCOAGULATION (RFTC)-NERVE-MEDIAN BRANCH-LUMBAR Bilateral 3/12/2018    Performed by Galo Clancy MD at [facility] OR    skin cancer removal       TONSILLECTOMY      TRANSESOPHAGEAL ECHOCARDIOGRAM (FELICIA) N/A 9/7/2017    Performed by [facility]co Calderon [facility] at [facility] CATH LAB       Current Outpatient Medications   Medication Sig    albuterol (PROVENTIL/VENTOLIN HFA) 90 mcg/actuation inhaler Inhale 1-2 puffs into the lungs every 6 (six) hours as needed for Wheezing. Rescue    albuterol (PROVENTIL/VENTOLIN HFA) 90 mcg/actuation inhaler 1 puffs every 4 hours as needed for cough, wheeze, or shortness of breath    ammonium lactate (LAC-HYDRIN) 12 % lotion     ATROPINE SULFATE, PF, OPHT Apply to eye.    dorzolamide-timolol (COSOPT) 2-0.5 % ophthalmic solution Place 1 drop into both eyes 2 (two) times daily. Twice a day    fluticasone (FLONASE) 50 mcg/actuation nasal spray 1 spray (50 mcg total) by Each Nare route once daily.    fluticasone-vilanterol  (BREO ELLIPTA) 200-25 mcg/dose DsDv diskus inhaler Inhale 1 puff into the lungs once daily.    FLUZONE HIGH-DOSE 2018-19, PF, 180 mcg/0.5 mL vaccine TO BE ADMINISTERED BY PHARMACIST FOR IMMUNIZATION    gabapentin (NEURONTIN) 300 MG capsule TAKE ONE CAPSULE BY MOUTH ONCE DAILY IN THE EVENING (Patient taking differently: TAKE TWO CAPSULE BY MOUTH ONCE DAILY IN THE EVENING)    HYDROcodone-acetaminophen (NORCO) 5-325 mg per tablet Take 1 tablet by mouth every 8 (eight) hours as needed for Pain.    lorazepam (ATIVAN) 1 MG tablet TAKE 1 TABLET BY MOUTH DAILY (Patient taking differently: TAKE 1 TABLET BY MOUTH DAILY PRN)    metoprolol tartrate (LOPRESSOR) 25 MG tablet Take 1 tablet (25 mg total) by mouth 2 (two) times daily.    metroNIDAZOLE 0.75 % Lotn APPLY A PEA SIZED AMOUNT TO FACE DAILY    montelukast (SINGULAIR) 10 mg tablet Take 1 tablet (10 mg total) by mouth every evening.    pantoprazole (PROTONIX) 40 MG tablet Take 1 tablet (40 mg total) by mouth once daily. (Patient taking differently: Take 40 mg by mouth once daily. )    predniSONE (DELTASONE) 5 MG tablet Take 2 tablets (10 mg total) by mouth 2 (two) times daily. may repeat for shortness of breath.    rosuvastatin (CRESTOR) 40 MG Tab TAKE 1 TABLET (40 MG TOTAL) BY MOUTH EVERY EVENING. (Patient taking differently: Take 20 mg by mouth every evening. )    spironolactone (ALDACTONE) 25 MG tablet Take 1 tablet (25 mg total) by mouth once daily.    VIIBRYD 40 mg Tab tablet Take 40 mg by mouth once daily.    XARELTO 20 mg Tab TAKE 1 TABLET (20 MG TOTAL) BY MOUTH DAILY WITH DINNER OR EVENING MEAL.     No current facility-administered medications for this visit.        Review of patient's allergies indicates:   Allergen Reactions    Atorvastatin      Other reaction(s): Joint pain    Augmentin [amoxicillin-pot clavulanate]      Other reaction(s): Mental Status Change    Baclofen (bulk) Nausea And Vomiting    Ciprofloxacin (bulk)     Decongest tabs       Other reaction(s): increased heart rate    Erythromycin Other (See Comments)    Flecainide Hives     And SOB. No reaction to Lidocaine     Fluoxetine      Other reaction(s): heart palpitations  Other reaction(s): anxiety    Lisinopril Other (See Comments)     cough    Losartan Other (See Comments)     Hypotension with lightheadedness    Morphine Other (See Comments)     confusion    Tramadol Other (See Comments)     SOB, low BP    Venlafaxine analogues      Changes in BP and increases heart rate       Afrin [oxymetazoline] Palpitations    Caffeine Palpitations    Tizanidine Anxiety     dizziness       Family History   Problem Relation Age of Onset    Heart disease Father     Ulcers Father     Arthritis Mother     Asthma Mother     Rheum arthritis Mother     Pneumonia Mother     Depression Son     Alzheimer's disease Maternal Uncle     Rheum arthritis Maternal Grandmother     Emphysema Maternal Grandfather     Cancer Maternal Grandfather         kidney    Kidney disease Maternal Grandfather     Cancer Paternal Grandmother         lung= smoker    Pneumonia Paternal Grandfather     Breast cancer Neg Hx     Ovarian cancer Neg Hx        Social History     Socioeconomic History    Marital status:      Spouse name: Not on file    Number of children: Not on file    Years of education: Not on file    Highest education level: Not on file   Social Needs    Financial resource strain: Not on file    Food insecurity - worry: Not on file    Food insecurity - inability: Not on file    Transportation needs - medical: Not on file    Transportation needs - non-medical: Not on file   Occupational History    Not on file   Tobacco Use    Smoking status: Never Smoker    Smokeless tobacco: Never Used   Substance and Sexual Activity    Alcohol use: No    Drug use: No    Sexual activity: Yes     Partners: Male   Other Topics Concern    Not on file   Social History Narrative    Not on file  "      Chief Complaint:   Chief Complaint   Patient presents with    Right Ankle - Pain       Consulting Physician: [facility]    History of present illness:    This is a 73 y.o. female who complains of right ankle pain following a fall in [place] on 10/21/2018.  She puts her pain at a 5/10.  She is in a splint.  She is nonweightbearing.    Review of Systems:    Constitution: Denies chills, fever, and sweats.  HENT: Denies headaches or blurry vision.  Cardiovascular: Denies chest pain or irregular heart beat.  Respiratory: Denies cough or shortness of breath.  Gastrointestinal: Denies abdominal pain, nausea, or vomiting.  Musculoskeletal:  Denies muscle cramps.  Neurological: Denies dizziness or focal weakness.  Psychiatric/Behavioral: Normal mental status.  Hematologic/Lymphatic: Denies bleeding problem or easy bruising/bleeding.  Skin: Denies rash or suspicious lesions.    Examination:    Vital Signs:    Vitals:    10/23/18 1348   BP: 123/60   Pulse: 68   Weight: 59.4 kg (130 lb 15.3 oz)   Height: 5' 7" (1.702 m)   PainSc:   5   PainLoc: Ankle       Body mass index is 20.51 kg/m².    This a well-developed, well nourished patient in no acute distress.    Alert and oriented x 3 and cooperative to examination.       Physical Exam: Right Ankle Exam     Gait:   Non weight bearing    Skin  Scars:   None  Rashes:  None    Inspection   Deformity:   None  Erythema:   None  Bruising:   Mild  Swelling:   Moderate  Lymphadenopathy: None    Instability:  None    Range of Motion: Not tested due to fracture    Muscle Strength: Not tested due to fracture    Tenderness:  Diffuse    Sensation:   Normal    Capillary refill:    Normal          Imaging: X-rays ordered and images interpreted today personally by me of the right ankle shows a lateral malleolus fracture along with disruption of the deltoid ligament.        Assessment: Pre-op exam  -     CBC auto differential; Standing    Closed displaced fracture of lateral " malleolus of right fibula, initial encounter  -     Vital signs; Standing  -     Cleanse with Chlorhexidine (CHG); Standing  -     Diet NPO; Standing  -     0.9%  NaCl infusion  -     Chlorohexidine Gluconate Bath; Standing  -     mupirocin 2 % ointment  -     Case Request Operating Room: ORIF, ANKLE  -     Place in Outpatient; Standing  -     clindamycin 900 MG/50 ML D5W 900 mg/50 mL IVPB 900 mg  -     Basic metabolic panel; Standing  -     CBC auto differential; Standing  -     Urinalysis; Standing  -     EKG 12-lead; Standing  -     X-Ray Chest PA And Lateral; Standing    Fracture of right ankle, lateral malleolus        Plan:  Because of the loss of the deltoid ligament and this alignment of the fracture we recommend open reduction internal fixation.  We discussed with the patient and she elected to proceed with that.  Will go ahead and obtain medical clearance and plan to get her fixed as soon as possible. After discussing the diagnosis and reviewing treatment options, the patient elected to proceed with surgical intervention.    In preparation for surgery:    A pre-operative clearance request was placed with patient's primary physician.    Appropriate pre-operative labs were ordered.    An EKG examination was ordered.    A chest X-ray was ordered.    Pertinent risk factors and comorbidities for surgery were identified and reviewed, including Afib and CHF.    Pre-operative antibiotics were ordered.    The risks, benefits, and alternatives to the procedure were explained to the patient including, but not limited to: incomplete pain relief, surgical failure, hardware failure, need for further procedures, damage to nerves, arteries, blood vessels and other structures, infection, Deep Vein Thrombosis (DVT), Pulmonary Embolus (PE), Complex Regional Pain Syndrome as well as general anesthetic complications including seizure, stroke, heart attack and even death. The patient understood these risks and wished to proceed  and signed the informed consent. All questions were answered. No guarantees were implied or stated.    We will obtain the necessary clearances and schedule the patient for surgery.            DISCLAIMER: This note may have been dictated using voice recognition software and may contain grammatical errors.     NOTE: Consult report sent to referring provider via [employer] EMR.

## 2018-10-25 NOTE — H&P (VIEW-ONLY)
Past Medical History:   Diagnosis Date    Anticoagulant long-term use     Anxiety     Arthritis     Atrial fibrillation     Cancer     skin    CHF (congestive heart failure)     Depression     DVT (deep venous thrombosis)     GERD (gastroesophageal reflux disease)     Glaucoma (increased eye pressure)     Hyperlipidemia     diet controlled    Hypertension     Interstitial lung disease     Pneumonia 1/31/2014    Stroke 6-3-14    Stroke     TIA (transient ischemic attack)     TIA (transient ischemic attack)        Past Surgical History:   Procedure Laterality Date    2 heart ablations      3 in total    ABLATION N/A 9/7/2017    Performed by Fco Calderon [facility] at [facility] CATH LAB    bilateral cataracts      BLOCK, NERVE, FACET JOINT, MEDIAL BRANCH, CERVICAL Right 6/7/2018    Performed by Galo Clancy MD at [facility] OR    BLOCK-NERVE-MEDIAL BRANCH-LUMBAR Bilateral 3/6/2018    Performed by Galo Clancy MD at [facility] OR    BRONCHOSCOPY N/A 5/1/2014    Performed by Jose Eduardo White MD at [facility] ENDO    CHOLECYSTECTOMY      CHOLECYSTECTOMY- open N/A 8/31/2016    Performed by Lavon Gonsalez MD at [facility] OR    COLONOSCOPY N/A 1/19/2017    Procedure: COLONOSCOPY and EGD;  Surgeon: Jonathan Schultz MD;  Location: [facility] ENDO;  Service: Endoscopy;  Laterality: N/A;    COLONOSCOPY and EGD N/A 1/19/2017    Performed by [facility] at [facility]    ESOPHAGOGASTRODUODENOSCOPY (EGD) N/A 1/19/2017    Performed by Jonathan Schultz MD at [facility] ENDO    INJECTION OF ANESTHETIC AGENT AROUND MEDIAL BRANCH NERVES INNERVATING CERVICAL FACET JOINT Right 6/7/2018    Procedure: BLOCK, NERVE, FACET JOINT, MEDIAL BRANCH, CERVICAL;  Surgeon: Galo Clancy MD;  Location: [facility] OR;  Service: Pain Management;  Laterality: Right;  C4, 5, 6    INJECTION-STEROID-EPIDURAL-CERVICAL N/A 4/10/2018    Performed by Galo Clancy MD at [facility] OR    INJECTION-STEROID-EPIDURAL-CERVICAL N/A 1/29/2018    Performed by Galo Clancy MD at [facility] OR     pyloristenosis      RADIOFREQUENCY ABLATION OF LUMBAR MEDIAL BRANCH NERVE AT SINGLE LEVEL Bilateral 9/21/2018    Procedure: RADIOFREQUENCY ABLATION, NERVE, SPINAL, LUMBAR, MEDIAL BRANCH, 1 LEVEL;  Surgeon: Galo Clancy MD;  Location: [facility] OR;  Service: Pain Management;  Laterality: Bilateral;  L3, 4, 5    RADIOFREQUENCY ABLATION, NERVE, SPINAL, LUMBAR, MEDIAL BRANCH, 1 LEVEL Bilateral 9/21/2018    Performed by Galo Clancy MD at [facility] OR    RADIOFREQUENCY THERMAL COAGULATION OF MEDIAL BRANCH OF POSTERIOR RAMUS OF CERVICAL SPINAL NERVE Right 7/3/2018    Procedure: RADIOFREQUENCY THERMAL COAGULATION, NERVE, SPINAL, CERVICAL, MEDIAL BRANCH OF POSTERIOR RAMUS;  Surgeon: Galo Clancy MD;  Location: [facility] OR;  Service: Pain Management;  Laterality: Right;  C4,5,6 - Burned at 80 degrees C. for 75 seconds each site    RADIOFREQUENCY THERMAL COAGULATION, NERVE, SPINAL, CERVICAL, MEDIAL BRANCH OF POSTERIOR RAMUS Right 7/3/2018    Performed by Galo Clancy MD at [facility] OR    RADIOFREQUENCY THERMOCOAGULATION (RFTC)-NERVE-MEDIAN BRANCH-LUMBAR Bilateral 3/12/2018    Performed by Galo Clancy MD at [facility] OR    skin cancer removal       TONSILLECTOMY      TRANSESOPHAGEAL ECHOCARDIOGRAM (FELICIA) N/A 9/7/2017    Performed by [facility]on [facility] at [facility] CATH LAB       Current Outpatient Medications   Medication Sig    albuterol (PROVENTIL/VENTOLIN HFA) 90 mcg/actuation inhaler Inhale 1-2 puffs into the lungs every 6 (six) hours as needed for Wheezing. Rescue    albuterol (PROVENTIL/VENTOLIN HFA) 90 mcg/actuation inhaler 1 puffs every 4 hours as needed for cough, wheeze, or shortness of breath    ammonium lactate (LAC-HYDRIN) 12 % lotion     ATROPINE SULFATE, PF, OPHT Apply to eye.    dorzolamide-timolol (COSOPT) 2-0.5 % ophthalmic solution Place 1 drop into both eyes 2 (two) times daily. Twice a day    fluticasone (FLONASE) 50 mcg/actuation nasal spray 1 spray (50 mcg total) by Each Nare route once daily.    fluticasone-vilanterol  (BREO ELLIPTA) 200-25 mcg/dose DsDv diskus inhaler Inhale 1 puff into the lungs once daily.    FLUZONE HIGH-DOSE 2018-19, PF, 180 mcg/0.5 mL vaccine TO BE ADMINISTERED BY PHARMACIST FOR IMMUNIZATION    gabapentin (NEURONTIN) 300 MG capsule TAKE ONE CAPSULE BY MOUTH ONCE DAILY IN THE EVENING (Patient taking differently: TAKE TWO CAPSULE BY MOUTH ONCE DAILY IN THE EVENING)    HYDROcodone-acetaminophen (NORCO) 5-325 mg per tablet Take 1 tablet by mouth every 8 (eight) hours as needed for Pain.    lorazepam (ATIVAN) 1 MG tablet TAKE 1 TABLET BY MOUTH DAILY (Patient taking differently: TAKE 1 TABLET BY MOUTH DAILY PRN)    metoprolol tartrate (LOPRESSOR) 25 MG tablet Take 1 tablet (25 mg total) by mouth 2 (two) times daily.    metroNIDAZOLE 0.75 % Lotn APPLY A PEA SIZED AMOUNT TO FACE DAILY    montelukast (SINGULAIR) 10 mg tablet Take 1 tablet (10 mg total) by mouth every evening.    pantoprazole (PROTONIX) 40 MG tablet Take 1 tablet (40 mg total) by mouth once daily. (Patient taking differently: Take 40 mg by mouth once daily. )    predniSONE (DELTASONE) 5 MG tablet Take 2 tablets (10 mg total) by mouth 2 (two) times daily. may repeat for shortness of breath.    rosuvastatin (CRESTOR) 40 MG Tab TAKE 1 TABLET (40 MG TOTAL) BY MOUTH EVERY EVENING. (Patient taking differently: Take 20 mg by mouth every evening. )    spironolactone (ALDACTONE) 25 MG tablet Take 1 tablet (25 mg total) by mouth once daily.    VIIBRYD 40 mg Tab tablet Take 40 mg by mouth once daily.    XARELTO 20 mg Tab TAKE 1 TABLET (20 MG TOTAL) BY MOUTH DAILY WITH DINNER OR EVENING MEAL.     No current facility-administered medications for this visit.        Review of patient's allergies indicates:   Allergen Reactions    Atorvastatin      Other reaction(s): Joint pain    Augmentin [amoxicillin-pot clavulanate]      Other reaction(s): Mental Status Change    Baclofen (bulk) Nausea And Vomiting    Ciprofloxacin (bulk)     Decongest tabs       Other reaction(s): increased heart rate    Erythromycin Other (See Comments)    Flecainide Hives     And SOB. No reaction to Lidocaine     Fluoxetine      Other reaction(s): heart palpitations  Other reaction(s): anxiety    Lisinopril Other (See Comments)     cough    Losartan Other (See Comments)     Hypotension with lightheadedness    Morphine Other (See Comments)     confusion    Tramadol Other (See Comments)     SOB, low BP    Venlafaxine analogues      Changes in BP and increases heart rate       Afrin [oxymetazoline] Palpitations    Caffeine Palpitations    Tizanidine Anxiety     dizziness       Family History   Problem Relation Age of Onset    Heart disease Father     Ulcers Father     Arthritis Mother     Asthma Mother     Rheum arthritis Mother     Pneumonia Mother     Depression Son     Alzheimer's disease Maternal Uncle     Rheum arthritis Maternal Grandmother     Emphysema Maternal Grandfather     Cancer Maternal Grandfather         kidney    Kidney disease Maternal Grandfather     Cancer Paternal Grandmother         lung= smoker    Pneumonia Paternal Grandfather     Breast cancer Neg Hx     Ovarian cancer Neg Hx        Social History     Socioeconomic History    Marital status:      Spouse name: Not on file    Number of children: Not on file    Years of education: Not on file    Highest education level: Not on file   Social Needs    Financial resource strain: Not on file    Food insecurity - worry: Not on file    Food insecurity - inability: Not on file    Transportation needs - medical: Not on file    Transportation needs - non-medical: Not on file   Occupational History    Not on file   Tobacco Use    Smoking status: Never Smoker    Smokeless tobacco: Never Used   Substance and Sexual Activity    Alcohol use: No    Drug use: No    Sexual activity: Yes     Partners: Male   Other Topics Concern    Not on file   Social History Narrative    Not on file  "      Chief Complaint:   Chief Complaint   Patient presents with    Right Ankle - Pain       Consulting Physician: [facility]    History of present illness:    This is a 73 y.o. female who complains of right ankle pain following a fall in [place] on 10/21/2018.  She puts her pain at a 5/10.  She is in a splint.  She is nonweightbearing.    Review of Systems:    Constitution: Denies chills, fever, and sweats.  HENT: Denies headaches or blurry vision.  Cardiovascular: Denies chest pain or irregular heart beat.  Respiratory: Denies cough or shortness of breath.  Gastrointestinal: Denies abdominal pain, nausea, or vomiting.  Musculoskeletal:  Denies muscle cramps.  Neurological: Denies dizziness or focal weakness.  Psychiatric/Behavioral: Normal mental status.  Hematologic/Lymphatic: Denies bleeding problem or easy bruising/bleeding.  Skin: Denies rash or suspicious lesions.    Examination:    Vital Signs:    Vitals:    10/23/18 1348   BP: 123/60   Pulse: 68   Weight: 59.4 kg (130 lb 15.3 oz)   Height: 5' 7" (1.702 m)   PainSc:   5   PainLoc: Ankle       Body mass index is 20.51 kg/m².    This a well-developed, well nourished patient in no acute distress.    Alert and oriented x 3 and cooperative to examination.       Physical Exam: Right Ankle Exam     Gait:   Non weight bearing    Skin  Scars:   None  Rashes:  None    Inspection   Deformity:   None  Erythema:   None  Bruising:   Mild  Swelling:   Moderate  Lymphadenopathy: None    Instability:  None    Range of Motion: Not tested due to fracture    Muscle Strength: Not tested due to fracture    Tenderness:  Diffuse    Sensation:   Normal    Capillary refill:    Normal          Imaging: X-rays ordered and images interpreted today personally by me of the right ankle shows a lateral malleolus fracture along with disruption of the deltoid ligament.        Assessment: Pre-op exam  -     CBC auto differential; Standing    Closed displaced fracture of lateral " malleolus of right fibula, initial encounter  -     Vital signs; Standing  -     Cleanse with Chlorhexidine (CHG); Standing  -     Diet NPO; Standing  -     0.9%  NaCl infusion  -     Chlorohexidine Gluconate Bath; Standing  -     mupirocin 2 % ointment  -     Case Request Operating Room: ORIF, ANKLE  -     Place in Outpatient; Standing  -     clindamycin 900 MG/50 ML D5W 900 mg/50 mL IVPB 900 mg  -     Basic metabolic panel; Standing  -     CBC auto differential; Standing  -     Urinalysis; Standing  -     EKG 12-lead; Standing  -     X-Ray Chest PA And Lateral; Standing    Fracture of right ankle, lateral malleolus        Plan:  Because of the loss of the deltoid ligament and this alignment of the fracture we recommend open reduction internal fixation.  We discussed with the patient and she elected to proceed with that.  Will go ahead and obtain medical clearance and plan to get her fixed as soon as possible. After discussing the diagnosis and reviewing treatment options, the patient elected to proceed with surgical intervention.    In preparation for surgery:    A pre-operative clearance request was placed with patient's primary physician.    Appropriate pre-operative labs were ordered.    An EKG examination was ordered.    A chest X-ray was ordered.    Pertinent risk factors and comorbidities for surgery were identified and reviewed, including Afib and CHF.    Pre-operative antibiotics were ordered.    The risks, benefits, and alternatives to the procedure were explained to the patient including, but not limited to: incomplete pain relief, surgical failure, hardware failure, need for further procedures, damage to nerves, arteries, blood vessels and other structures, infection, Deep Vein Thrombosis (DVT), Pulmonary Embolus (PE), Complex Regional Pain Syndrome as well as general anesthetic complications including seizure, stroke, heart attack and even death. The patient understood these risks and wished to proceed  and signed the informed consent. All questions were answered. No guarantees were implied or stated.    We will obtain the necessary clearances and schedule the patient for surgery.            DISCLAIMER: This note may have been dictated using voice recognition software and may contain grammatical errors.     NOTE: Consult report sent to referring provider via [employer] EMR.

## 2018-10-26 ENCOUNTER — HOSPITAL ENCOUNTER (OUTPATIENT)
Dept: PREADMISSION TESTING | Facility: HOSPITAL | Age: 73
Discharge: HOME | End: 2018-10-26
Attending: ORTHOPAEDIC SURGERY
Payer: MEDICARE

## 2018-10-26 VITALS — WEIGHT: 133 LBS | BODY MASS INDEX: 20.88 KG/M2 | HEIGHT: 67 IN

## 2018-10-26 DIAGNOSIS — S82.61XA CLOSED DISPLACED FRACTURE OF LATERAL MALLEOLUS OF RIGHT FIBULA, INITIAL ENCOUNTER: ICD-10-CM

## 2018-10-26 DIAGNOSIS — Z01.818 PRE-OP EXAM: ICD-10-CM

## 2018-10-26 LAB
BASOPHILS # BLD AUTO: 0 K/UL
BASOPHILS NFR BLD: 0 %
DIFFERENTIAL METHOD: ABNORMAL
EOSINOPHIL # BLD AUTO: 0.1 K/UL
EOSINOPHIL NFR BLD: 0.9 %
ERYTHROCYTE [DISTWIDTH] IN BLOOD BY AUTOMATED COUNT: 14 %
HCT VFR BLD AUTO: 40.8 %
HGB BLD-MCNC: 13.2 G/DL
LYMPHOCYTES # BLD AUTO: 0.4 K/UL
LYMPHOCYTES NFR BLD: 2.9 %
MCH RBC QN AUTO: 32.4 PG
MCHC RBC AUTO-ENTMCNC: 32.5 G/DL
MCV RBC AUTO: 100 FL
MONOCYTES # BLD AUTO: 0.8 K/UL
MONOCYTES NFR BLD: 5.5 %
NEUTROPHILS # BLD AUTO: 12.3 K/UL
NEUTROPHILS NFR BLD: 90.7 %
PLATELET # BLD AUTO: 368 K/UL
PMV BLD AUTO: 6.9 FL
RBC # BLD AUTO: 4.09 M/UL
WBC # BLD AUTO: 13.6 K/UL

## 2018-10-26 PROCEDURE — 99900103 DSU ONLY-NO CHARGE-INITIAL HR (STAT)

## 2018-10-26 PROCEDURE — 85025 COMPLETE CBC W/AUTO DIFF WBC: CPT

## 2018-10-26 PROCEDURE — 99900104 DSU ONLY-NO CHARGE-EA ADD'L HR (STAT)

## 2018-10-26 PROCEDURE — 36415 COLL VENOUS BLD VENIPUNCTURE: CPT

## 2018-10-31 ENCOUNTER — ANESTHESIA EVENT (OUTPATIENT)
Dept: SURGERY | Facility: HOSPITAL | Age: 73
End: 2018-10-31
Payer: MEDICARE

## 2018-10-31 ENCOUNTER — TELEPHONE (OUTPATIENT)
Dept: CARDIOLOGY | Facility: CLINIC | Age: 73
End: 2018-10-31

## 2018-10-31 NOTE — TELEPHONE ENCOUNTER
----- Message from Lauren Peña sent at 10/31/2018 11:40 AM CDT -----  Contact: Miryam with Dr Husain  Give Miryam a call back at [phone] asap.  Thanks

## 2018-10-31 NOTE — TELEPHONE ENCOUNTER
Call placed to Miryam at [facility]. No answer, sent message through IM.

## 2018-11-01 ENCOUNTER — ANESTHESIA (OUTPATIENT)
Dept: SURGERY | Facility: HOSPITAL | Age: 73
End: 2018-11-01
Payer: MEDICARE

## 2018-11-01 ENCOUNTER — HOSPITAL ENCOUNTER (OUTPATIENT)
Facility: HOSPITAL | Age: 73
Discharge: HOME | End: 2018-11-01
Attending: ORTHOPAEDIC SURGERY | Admitting: ORTHOPAEDIC SURGERY
Payer: MEDICARE

## 2018-11-01 DIAGNOSIS — S82.61XA FRACTURE OF RIGHT ANKLE, LATERAL MALLEOLUS: ICD-10-CM

## 2018-11-01 DIAGNOSIS — S82.61XA CLOSED DISPLACED FRACTURE OF LATERAL MALLEOLUS OF RIGHT FIBULA, INITIAL ENCOUNTER: ICD-10-CM

## 2018-11-01 PROCEDURE — 37000008 HC ANESTHESIA 1ST 15 MINUTES: Performed by: ORTHOPAEDIC SURGERY

## 2018-11-01 PROCEDURE — C1769 GUIDE WIRE: HCPCS | Performed by: ORTHOPAEDIC SURGERY

## 2018-11-01 PROCEDURE — 36000709 HC OR TIME LEV III EA ADD 15 MIN: Performed by: ORTHOPAEDIC SURGERY

## 2018-11-01 PROCEDURE — 99900104 DSU ONLY-NO CHARGE-EA ADD'L HR (STAT): Performed by: ORTHOPAEDIC SURGERY

## 2018-11-01 PROCEDURE — D9220A PRA ANESTHESIA: Mod: CRNA,,, | Performed by: NURSE ANESTHETIST, CERTIFIED REGISTERED

## 2018-11-01 PROCEDURE — 27792 TREATMENT OF ANKLE FRACTURE: CPT | Mod: RT,,, | Performed by: ORTHOPAEDIC SURGERY

## 2018-11-01 PROCEDURE — C1713 ANCHOR/SCREW BN/BN,TIS/BN: HCPCS | Performed by: ORTHOPAEDIC SURGERY

## 2018-11-01 PROCEDURE — 64447 NJX AA&/STRD FEMORAL NRV IMG: CPT | Performed by: ANESTHESIOLOGY

## 2018-11-01 PROCEDURE — 99900103 DSU ONLY-NO CHARGE-INITIAL HR (STAT): Performed by: ORTHOPAEDIC SURGERY

## 2018-11-01 PROCEDURE — D9220A PRA ANESTHESIA: Mod: ANES,,, | Performed by: ANESTHESIOLOGY

## 2018-11-01 PROCEDURE — 71000015 HC POSTOP RECOV 1ST HR: Performed by: ORTHOPAEDIC SURGERY

## 2018-11-01 PROCEDURE — 25000003 PHARM REV CODE 250: Performed by: ANESTHESIOLOGY

## 2018-11-01 PROCEDURE — S0077 INJECTION, CLINDAMYCIN PHOSP: HCPCS | Performed by: ORTHOPAEDIC SURGERY

## 2018-11-01 PROCEDURE — 76942 ECHO GUIDE FOR BIOPSY: CPT | Mod: 26,,, | Performed by: ANESTHESIOLOGY

## 2018-11-01 PROCEDURE — 27201423 OPTIME MED/SURG SUP & DEVICES STERILE SUPPLY: Performed by: ORTHOPAEDIC SURGERY

## 2018-11-01 PROCEDURE — 63600175 PHARM REV CODE 636 W HCPCS

## 2018-11-01 PROCEDURE — 25000003 PHARM REV CODE 250: Performed by: NURSE ANESTHETIST, CERTIFIED REGISTERED

## 2018-11-01 PROCEDURE — S0020 INJECTION, BUPIVICAINE HYDRO: HCPCS | Performed by: ANESTHESIOLOGY

## 2018-11-01 PROCEDURE — 71000039 HC RECOVERY, EACH ADD'L HOUR: Performed by: ORTHOPAEDIC SURGERY

## 2018-11-01 PROCEDURE — 63600175 PHARM REV CODE 636 W HCPCS: Performed by: NURSE ANESTHETIST, CERTIFIED REGISTERED

## 2018-11-01 PROCEDURE — 37000009 HC ANESTHESIA EA ADD 15 MINS: Performed by: ORTHOPAEDIC SURGERY

## 2018-11-01 PROCEDURE — 27800903 OPTIME MED/SURG SUP & DEVICES OTHER IMPLANTS: Performed by: ORTHOPAEDIC SURGERY

## 2018-11-01 PROCEDURE — 64447 NJX AA&/STRD FEMORAL NRV IMG: CPT | Mod: 59,RT,, | Performed by: ANESTHESIOLOGY

## 2018-11-01 PROCEDURE — 71000016 HC POSTOP RECOV ADDL HR: Performed by: ORTHOPAEDIC SURGERY

## 2018-11-01 PROCEDURE — 27200750 HC INSULATED NEEDLE/ STIMUPLEX: Performed by: ANESTHESIOLOGY

## 2018-11-01 PROCEDURE — 27200651 HC AIRWAY, LMA: Performed by: NURSE ANESTHETIST, CERTIFIED REGISTERED

## 2018-11-01 PROCEDURE — 71000033 HC RECOVERY, INTIAL HOUR: Performed by: ORTHOPAEDIC SURGERY

## 2018-11-01 PROCEDURE — 25000003 PHARM REV CODE 250: Performed by: ORTHOPAEDIC SURGERY

## 2018-11-01 PROCEDURE — 64445 NJX AA&/STRD SCIATIC NRV IMG: CPT | Mod: 59,RT,, | Performed by: ANESTHESIOLOGY

## 2018-11-01 PROCEDURE — 36000708 HC OR TIME LEV III 1ST 15 MIN: Performed by: ORTHOPAEDIC SURGERY

## 2018-11-01 DEVICE — SCREW BNE LOK HEXLB 3.5X12: Type: IMPLANTABLE DEVICE | Site: ANKLE | Status: FUNCTIONAL

## 2018-11-01 DEVICE — SCREW BONE LOCK HEXALOBE 2.7 X: Type: IMPLANTABLE DEVICE | Site: ANKLE | Status: FUNCTIONAL

## 2018-11-01 DEVICE — SCREW BNE N LOK HEXLB 3.5X14: Type: IMPLANTABLE DEVICE | Site: ANKLE | Status: FUNCTIONAL

## 2018-11-01 DEVICE — SCREW BONE NL HEXALOBE 3.5 X 1: Type: IMPLANTABLE DEVICE | Site: ANKLE | Status: FUNCTIONAL

## 2018-11-01 DEVICE — SCREW BNE LOK HEXLB 3.5X14: Type: IMPLANTABLE DEVICE | Site: ANKLE | Status: FUNCTIONAL

## 2018-11-01 RX ORDER — FENTANYL CITRATE 50 UG/ML
INJECTION, SOLUTION INTRAMUSCULAR; INTRAVENOUS
Status: DISCONTINUED | OUTPATIENT
Start: 2018-11-01 | End: 2018-11-01

## 2018-11-01 RX ORDER — PHENYLEPHRINE HYDROCHLORIDE 10 MG/ML
INJECTION INTRAVENOUS
Status: DISCONTINUED | OUTPATIENT
Start: 2018-11-01 | End: 2018-11-01

## 2018-11-01 RX ORDER — SODIUM CHLORIDE, SODIUM LACTATE, POTASSIUM CHLORIDE, CALCIUM CHLORIDE 600; 310; 30; 20 MG/100ML; MG/100ML; MG/100ML; MG/100ML
INJECTION, SOLUTION INTRAVENOUS CONTINUOUS
Status: DISCONTINUED | OUTPATIENT
Start: 2018-11-01 | End: 2018-11-01 | Stop reason: HOSPADM

## 2018-11-01 RX ORDER — IBUPROFEN 400 MG/1
800 TABLET ORAL 3 TIMES DAILY
Status: DISCONTINUED | OUTPATIENT
Start: 2018-11-01 | End: 2018-11-01 | Stop reason: HOSPADM

## 2018-11-01 RX ORDER — HYDROCODONE BITARTRATE AND ACETAMINOPHEN 10; 325 MG/1; MG/1
1 TABLET ORAL EVERY 4 HOURS PRN
Status: DISCONTINUED | OUTPATIENT
Start: 2018-11-01 | End: 2018-11-01 | Stop reason: HOSPADM

## 2018-11-01 RX ORDER — FENTANYL CITRATE 50 UG/ML
25 INJECTION, SOLUTION INTRAMUSCULAR; INTRAVENOUS EVERY 5 MIN PRN
Status: DISCONTINUED | OUTPATIENT
Start: 2018-11-01 | End: 2018-11-01 | Stop reason: HOSPADM

## 2018-11-01 RX ORDER — BUPIVACAINE HYDROCHLORIDE 5 MG/ML
INJECTION, SOLUTION EPIDURAL; INTRACAUDAL
Status: COMPLETED | OUTPATIENT
Start: 2018-11-01 | End: 2018-11-01

## 2018-11-01 RX ORDER — LIDOCAINE HCL/PF 100 MG/5ML
SYRINGE (ML) INTRAVENOUS
Status: DISCONTINUED | OUTPATIENT
Start: 2018-11-01 | End: 2018-11-01

## 2018-11-01 RX ORDER — OXYCODONE AND ACETAMINOPHEN 5; 325 MG/1; MG/1
1-2 TABLET ORAL EVERY 6 HOURS PRN
Qty: 40 TABLET | Refills: 0 | Status: SHIPPED | OUTPATIENT
Start: 2018-11-01 | End: 2018-12-14

## 2018-11-01 RX ORDER — EPHEDRINE SULFATE 50 MG/ML
INJECTION, SOLUTION INTRAVENOUS
Status: DISCONTINUED | OUTPATIENT
Start: 2018-11-01 | End: 2018-11-01

## 2018-11-01 RX ORDER — HYDROMORPHONE HYDROCHLORIDE 2 MG/ML
0.2 INJECTION, SOLUTION INTRAMUSCULAR; INTRAVENOUS; SUBCUTANEOUS EVERY 5 MIN PRN
Status: DISCONTINUED | OUTPATIENT
Start: 2018-11-01 | End: 2018-11-01 | Stop reason: HOSPADM

## 2018-11-01 RX ORDER — PROPOFOL 10 MG/ML
VIAL (ML) INTRAVENOUS
Status: DISCONTINUED | OUTPATIENT
Start: 2018-11-01 | End: 2018-11-01

## 2018-11-01 RX ORDER — LIDOCAINE HYDROCHLORIDE 10 MG/ML
5 INJECTION, SOLUTION EPIDURAL; INFILTRATION; INTRACAUDAL; PERINEURAL ONCE
Status: COMPLETED | OUTPATIENT
Start: 2018-11-01 | End: 2018-11-01

## 2018-11-01 RX ORDER — ACETAMINOPHEN 10 MG/ML
INJECTION, SOLUTION INTRAVENOUS
Status: DISCONTINUED | OUTPATIENT
Start: 2018-11-01 | End: 2018-11-01

## 2018-11-01 RX ORDER — MUPIROCIN 20 MG/G
OINTMENT TOPICAL
Status: DISCONTINUED | OUTPATIENT
Start: 2018-11-01 | End: 2018-11-01 | Stop reason: HOSPADM

## 2018-11-01 RX ORDER — OXYCODONE HYDROCHLORIDE 5 MG/1
5 TABLET ORAL
Status: DISCONTINUED | OUTPATIENT
Start: 2018-11-01 | End: 2018-11-01 | Stop reason: HOSPADM

## 2018-11-01 RX ORDER — HYDROCODONE BITARTRATE AND ACETAMINOPHEN 5; 325 MG/1; MG/1
1 TABLET ORAL EVERY 4 HOURS PRN
Status: DISCONTINUED | OUTPATIENT
Start: 2018-11-01 | End: 2018-11-01 | Stop reason: HOSPADM

## 2018-11-01 RX ORDER — CLINDAMYCIN PHOSPHATE 900 MG/50ML
900 INJECTION, SOLUTION INTRAVENOUS
Status: COMPLETED | OUTPATIENT
Start: 2018-11-01 | End: 2018-11-01

## 2018-11-01 RX ORDER — DEXAMETHASONE SODIUM PHOSPHATE 4 MG/ML
INJECTION, SOLUTION INTRA-ARTICULAR; INTRALESIONAL; INTRAMUSCULAR; INTRAVENOUS; SOFT TISSUE
Status: DISCONTINUED | OUTPATIENT
Start: 2018-11-01 | End: 2018-11-01

## 2018-11-01 RX ORDER — ONDANSETRON 2 MG/ML
INJECTION INTRAMUSCULAR; INTRAVENOUS
Status: DISCONTINUED | OUTPATIENT
Start: 2018-11-01 | End: 2018-11-01

## 2018-11-01 RX ORDER — MIDAZOLAM HYDROCHLORIDE 1 MG/ML
INJECTION, SOLUTION INTRAMUSCULAR; INTRAVENOUS
Status: DISCONTINUED | OUTPATIENT
Start: 2018-11-01 | End: 2018-11-01

## 2018-11-01 RX ADMIN — MIDAZOLAM 1 MG: 1 INJECTION INTRAMUSCULAR; INTRAVENOUS at 07:11

## 2018-11-01 RX ADMIN — CLINDAMYCIN PHOSPHATE 900 MG: 18 INJECTION, SOLUTION INTRAVENOUS at 08:11

## 2018-11-01 RX ADMIN — FENTANYL CITRATE 50 MCG: 50 INJECTION, SOLUTION INTRAMUSCULAR; INTRAVENOUS at 07:11

## 2018-11-01 RX ADMIN — PHENYLEPHRINE HYDROCHLORIDE 80 MCG: 10 INJECTION INTRAVENOUS at 08:11

## 2018-11-01 RX ADMIN — EPHEDRINE SULFATE 5 MG: 50 INJECTION, SOLUTION INTRAMUSCULAR; INTRAVENOUS; SUBCUTANEOUS at 09:11

## 2018-11-01 RX ADMIN — SODIUM CHLORIDE, SODIUM LACTATE, POTASSIUM CHLORIDE, AND CALCIUM CHLORIDE: .6; .31; .03; .02 INJECTION, SOLUTION INTRAVENOUS at 09:11

## 2018-11-01 RX ADMIN — PHENYLEPHRINE HYDROCHLORIDE 80 MCG: 10 INJECTION INTRAVENOUS at 09:11

## 2018-11-01 RX ADMIN — PHENYLEPHRINE HYDROCHLORIDE 100 MCG: 10 INJECTION INTRAVENOUS at 09:11

## 2018-11-01 RX ADMIN — SODIUM CHLORIDE, SODIUM LACTATE, POTASSIUM CHLORIDE, AND CALCIUM CHLORIDE: .6; .31; .03; .02 INJECTION, SOLUTION INTRAVENOUS at 06:11

## 2018-11-01 RX ADMIN — EPHEDRINE SULFATE 10 MG: 50 INJECTION, SOLUTION INTRAMUSCULAR; INTRAVENOUS; SUBCUTANEOUS at 07:11

## 2018-11-01 RX ADMIN — PHENYLEPHRINE HYDROCHLORIDE 150 MCG: 10 INJECTION INTRAVENOUS at 09:11

## 2018-11-01 RX ADMIN — BUPIVACAINE HYDROCHLORIDE 10 ML: 5 INJECTION, SOLUTION EPIDURAL; INTRACAUDAL; PERINEURAL at 10:11

## 2018-11-01 RX ADMIN — ONDANSETRON 4 MG: 2 INJECTION, SOLUTION INTRAMUSCULAR; INTRAVENOUS at 09:11

## 2018-11-01 RX ADMIN — MUPIROCIN: 20 OINTMENT TOPICAL at 06:11

## 2018-11-01 RX ADMIN — PROPOFOL 140 MG: 10 INJECTION, EMULSION INTRAVENOUS at 07:11

## 2018-11-01 RX ADMIN — FENTANYL CITRATE 25 MCG: 50 INJECTION, SOLUTION INTRAMUSCULAR; INTRAVENOUS at 08:11

## 2018-11-01 RX ADMIN — EPHEDRINE SULFATE 25 MG: 50 INJECTION, SOLUTION INTRAMUSCULAR; INTRAVENOUS; SUBCUTANEOUS at 08:11

## 2018-11-01 RX ADMIN — DEXAMETHASONE SODIUM PHOSPHATE 4 MG: 4 INJECTION, SOLUTION INTRAMUSCULAR; INTRAVENOUS at 08:11

## 2018-11-01 RX ADMIN — EPHEDRINE SULFATE 10 MG: 50 INJECTION, SOLUTION INTRAMUSCULAR; INTRAVENOUS; SUBCUTANEOUS at 08:11

## 2018-11-01 RX ADMIN — LIDOCAINE HYDROCHLORIDE 75 MG: 20 INJECTION, SOLUTION INTRAVENOUS at 07:11

## 2018-11-01 RX ADMIN — FENTANYL CITRATE 25 MCG: 50 INJECTION, SOLUTION INTRAMUSCULAR; INTRAVENOUS at 09:11

## 2018-11-01 RX ADMIN — ACETAMINOPHEN 1000 MG: 10 INJECTION, SOLUTION INTRAVENOUS at 08:11

## 2018-11-01 RX ADMIN — LIDOCAINE HYDROCHLORIDE 20 MG: 10 INJECTION, SOLUTION EPIDURAL; INFILTRATION; INTRACAUDAL; PERINEURAL at 06:11

## 2018-11-01 NOTE — ANESTHESIA PREPROCEDURE EVALUATION
11/01/2018  Ayla Dela Cruz is a 73 y.o., female.    Anesthesia Evaluation    I have reviewed the Patient Summary Reports.    I have reviewed the Nursing Notes.   I have reviewed the Medications.     Review of Systems  Anesthesia Hx:  No problems with previous Anesthesia    Social:  Non-Smoker    Hematology/Oncology:  Hematology Normal   Oncology Normal     Cardiovascular:   Hypertension Dysrhythmias atrial fibrillation CHF hyperlipidemia AVILA    Pulmonary:   Pneumonia Shortness of breath    Hepatic/GI:   GERD    Musculoskeletal:   Arthritis     Neurological:   TIA, CVA Neuromuscular Disease,    Psych:   Psychiatric History depression          Physical Exam  General:  Well nourished    Airway/Jaw/Neck:  Airway Findings: Mouth Opening: Small, but > 3cm Tongue: Normal  General Airway Assessment: Adult  Mallampati: III  Jaw/Neck Findings:  Micrognathia     Eyes/Ears/Nose:  EYES/EARS/NOSE FINDINGS: Normal    Chest/Lungs:  Chest/Lungs Findings: Clear to auscultation, Normal Respiratory Rate     Heart/Vascular:  Heart Findings: Rate: Normal  Rhythm: Regular Rhythm        Mental Status:  Mental Status Findings:  Cooperative, Alert and Oriented         Anesthesia Plan  Type of Anesthesia, risks & benefits discussed:  Anesthesia Type:  general  Patient's Preference:   Intra-op Monitoring Plan: standard ASA monitors  Intra-op Monitoring Plan Comments:   Post Op Pain Control Plan: peripheral nerve block, IV/PO Opioids PRN and multimodal analgesia  Post Op Pain Control Plan Comments:   Induction:   IV  Beta Blocker:  Patient is on a Beta-Blocker and has received one dose within the past 24 hours (No further documentation required).       Informed Consent: Patient understands risks and agrees with Anesthesia plan.  Questions answered. Anesthesia consent signed with patient.  ASA Score: 3     Day of Surgery Review of  History & Physical: I have interviewed and examined the patient. I have reviewed the patient's H&P dated:    H&P update referred to the surgeon.         Ready For Surgery From Anesthesia Perspective.

## 2018-11-01 NOTE — ANESTHESIA PROCEDURE NOTES
Peripheral    Patient location during procedure: pre-op   Block not for primary anesthetic.  Reason for block: at surgeon's request and post-op pain management   Post-op Pain Location: right ankle   Start time: 11/1/2018 7:15 AM  Timeout: 11/1/2018 7:15 AM   End time: 11/1/2018 7:20 AM  Staffing  Anesthesiologist: Shaji Powers MD  Performed: anesthesiologist   Preanesthetic Checklist  Completed: patient identified, site marked, surgical consent, pre-op evaluation, timeout performed, IV checked, risks and benefits discussed and monitors and equipment checked  Peripheral Block  Patient position: supine  Prep: ChloraPrep  Patient monitoring: heart rate, cardiac monitor, continuous pulse ox, continuous capnometry and frequent blood pressure checks  Block type: adductor canal  Laterality: right  Injection technique: single shot  Needle  Needle type: Stimuplex   Needle gauge: 21 G  Needle length: 4 in  Needle localization: anatomical landmarks and ultrasound guidance   -ultrasound image captured on disc.  Assessment  Injection assessment: negative aspiration, negative parasthesia and local visualized surrounding nerve  Paresthesia pain: none  Heart rate change: no  Slow fractionated injection: yes  Additional Notes  VSS.  [facility] RN monitoring vitals throughout procedure.  Patient tolerated procedure well.

## 2018-11-01 NOTE — INTERVAL H&P NOTE
The patient has been examined and the H&P has been reviewed:    I concur with the findings and no changes have occurred since H&P was written.    Anesthesia/Surgery risks, benefits and alternative options discussed and understood by patient/family.          Active Hospital Problems    Diagnosis  POA    Fracture of right ankle, lateral malleolus [S82.61XA]  Yes      Resolved Hospital Problems   No resolved problems to display.

## 2018-11-01 NOTE — TRANSFER OF CARE
"Anesthesia Transfer of Care Note    Patient: Ayla Dela Cruz    Procedure(s) Performed: Procedure(s) (LRB):  ORIF, ANKLE (Right)    Patient location: PACU    Anesthesia Type: general    Transport from OR: Transported from OR on 2-3 L/min O2 by NC with adequate spontaneous ventilation    Post pain: adequate analgesia    Post assessment: no apparent anesthetic complications and tolerated procedure well    Post vital signs: stable    Level of consciousness: sedated    Nausea/Vomiting: no nausea/vomiting    Complications: none    Transfer of care protocol was followed      Last vitals:   Visit Vitals  BP (!) 141/66 (BP Location: Left arm, Patient Position: Lying)   Pulse 68   Temp 36.5 °C (97.7 °F) (Skin)   Resp 18   Ht 5' 7" (1.702 m)   Wt 60.3 kg (133 lb)   SpO2 100%   Breastfeeding? No   BMI 20.83 kg/m²     "

## 2018-11-01 NOTE — OP NOTE
DATE OF PROCEDURE:  11/01/2018.    PREOPERATIVE DIAGNOSIS:  Right lateral malleolus fracture.    POSTOPERATIVE DIAGNOSIS:  Right lateral malleolus fracture.    PROCEDURE:  Open reduction and internal fixation of right ankle lateral   malleolus.    ANESTHESIA:  General.    SURGEON:  Wilfredo Aguero M.D.    ASSISTANT:  Bea Kennedy.    HISTORY:  Ms. Dela Cruz is a 73-year-old woman who presented to our office   following a fall.  Her imaging studies and examination were consistent with a   displaced lateral malleolus fracture.  We discussed treatment options with her   and she elected to proceed with an open reduction and internal fixation of the   lateral malleolus in order to stabilize her ankle.  The risks and benefits of   surgical intervention including the potential for incomplete pain relief and the   need for further procedures were explained to the patient who expressed she   understood and wished to proceed.  Informed consent was obtained.    PROCEDURE IN DETAIL:  After verifying informed consent and correct site, the   patient was brought back to the Operating Room, placed on the OR table in supine   position.  Preoperative IV antibiotics were administered and timeout was   performed.  The patient's right lower extremity was then prepped and draped in   sterile fashion.  We began by exsanguinating the leg and elevating the   tourniquet to 250 mmHg.  We then created an incision over the lateral malleolus   directly over the fracture site.  Dissection was carried down through skin and   subcutaneous tissue until we came down on to the fracture.  The periosteum was   elevated and we were able to see the fracture site.  Once we had cleared up the   tissue from around the fracture including some early callus in between the   fracture pieces, we had a complex fracture, which had a large posterior spike   along with the distal end of the lateral malleolus as a separate piece along   with 2 smaller anterior pieces  on the lateral malleolus.  We cleaned up all the   tissue between these pieces and then freed them up, so we could relocate them.    We used a Lobster Claw to perform reduction maneuver to get our length back on   the posterior spike and then we held this with a K-wire.  We then verified under   C-arm projection that we had what we felt was a good reduction.  There was some   comminution of the bone, both on the anterior medial side where there were   several small pieces and also near the distal lateral malleolus piece.  We did   not want to force lateral malleolus too much as it was relatively soft and of   questionable quality for us to be able to reduce to hard.  Once we gained our   length, we trialled several plates and elected to proceed with an Acumed   posterolateral plate because this covered our posterior spike nicely.  We were   able to place the plate in such a way that we had three screws in our distal   holes that captured the distal lateral malleolus along with the proximal plate   that captured the spike.  We placed all the screws and verified under C-arm   projection that we had good alignment of the plate and the fracture.  There was   still a little bit of comminution to the fracture and the reduction was   adequate.  With all of our screws in place, we took our final pictures on AP and   lateral projections, which showed a well-reduced fracture with good placement   of the plate.  At this point, the wound was copiously irrigated.  We had a small   gap in the lateral side of the reapproximation of the distal lateral malleolus   and we packed this with DBX bone graft.  We then used 2-0 Vicryl to   reapproximate our periosteum over the plate and then 2-0 Vicryl to reapproximate   our skin subcutaneously followed with 3-0 nylon to close the skin.  With the   wound closed, we applied a sterile dressing and then a posterior splint.  The   patient was then awoken from anesthesia, extubated and brought  to the PACU in   good condition.    TOTAL TOURNIQUET TIME:  76 minutes.    DRAINS:  None.    SPECIMEN:  None.    COMPLICATIONS:  None.    ESTIMATED BLOOD LOSS:  Minimal.    POSTOPERATIVE PLAN:  She will be discharged home later this morning.  She has   been given complete postop instructions and pain medications.      EGDAR/IN  dd: 11/01/2018 10:44:59 (CDT)  td: 11/01/2018 12:15:39 (CDT)  Doc ID   #[id number]  Job ID #[id number]    CC:

## 2018-11-01 NOTE — ANESTHESIA POSTPROCEDURE EVALUATION
"Anesthesia Post Evaluation    Patient: Ayla Dela Cruz    Procedure(s) Performed: Procedure(s) (LRB):  ORIF, ANKLE (Right)    Final Anesthesia Type: general  Patient location during evaluation: PACU  Patient participation: Yes- Able to Participate  Level of consciousness: awake and alert  Post-procedure vital signs: reviewed and stable  Pain management: adequate  Airway patency: patent  PONV status at discharge: No PONV  Anesthetic complications: no      Cardiovascular status: hemodynamically stable  Respiratory status: unassisted and room air  Hydration status: euvolemic  Follow-up not needed.        Visit Vitals  BP (!) 107/53 (BP Location: Right arm, Patient Position: Lying)   Pulse 72   Temp 36.7 °C (98 °F) (Temporal)   Resp 16   Ht 5' 7" (1.702 m)   Wt 60.3 kg (133 lb)   SpO2 98%   Breastfeeding? No   BMI 20.83 kg/m²       Pain/Chalino Score: Pain Assessment Performed: Yes (11/1/2018  1:20 PM)  Presence of Pain: denies (11/1/2018  1:20 PM)  Chalino Score: 10 (11/1/2018  1:20 PM)        "

## 2018-11-01 NOTE — PLAN OF CARE
Pt. Awake and alert, vital signs stable.  Tolerated liquids without nausea.  Denies pain. Pt. Has an Exparel block to right LE.  Pt. Is not weight bearing.  IV removed per policy, catheter intact. Discharge instructions reviewed with patient and spouse, and written instructions given to patient and spouse, who both  verbalized understanding.  Dr. Aguero spoke with pt. And answered questions and states pt. Is ready to discharge. Discharge home with family per wheelchair to lobby.

## 2018-11-01 NOTE — ANESTHESIA PROCEDURE NOTES
Peripheral    Patient location during procedure: pre-op   Block not for primary anesthetic.  Reason for block: at surgeon's request and post-op pain management   Post-op Pain Location: right ankle   Start time: 11/1/2018 7:10 AM  Timeout: 11/1/2018 7:10 AM   End time: 11/1/2018 7:15 AM  Staffing  Anesthesiologist: Shaji Powers MD  Performed: anesthesiologist   Preanesthetic Checklist  Completed: patient identified, site marked, surgical consent, pre-op evaluation, timeout performed, IV checked, risks and benefits discussed and monitors and equipment checked  Peripheral Block  Patient position: supine  Prep: ChloraPrep  Patient monitoring: heart rate, cardiac monitor, continuous pulse ox, continuous capnometry and frequent blood pressure checks  Block type: popliteal  Laterality: right  Injection technique: single shot  Needle  Needle type: Stimuplex   Needle gauge: 21 G  Needle length: 4 in  Needle localization: anatomical landmarks and ultrasound guidance   -ultrasound image captured on disc.  Assessment  Injection assessment: negative aspiration, negative parasthesia and local visualized surrounding nerve  Paresthesia pain: none  Heart rate change: no  Slow fractionated injection: yes  Additional Notes  VSS. +20cc Exparel 1.3% DOSC RN monitoring vitals throughout procedure.  Patient tolerated procedure well.

## 2018-11-01 NOTE — BRIEF OP NOTE
[facility]  Brief Operative Note     SUMMARY     Surgery Date: 11/1/2018     Surgeon(s) and Role:     * Wilfredo Aguero MD - Primary    Assisting Surgeon: None    Pre-op Diagnosis:  Closed displaced fracture of lateral malleolus of right fibula, initial encounter [S82.61XA]    Post-op Diagnosis:  Post-Op Diagnosis Codes:     * Closed displaced fracture of lateral malleolus of right fibula, initial encounter [S82.61XA]    Procedure(s) (LRB):  ORIF, ANKLE (Right)    Anesthesia: General    Description of the findings of the procedure: right ankle open reduction and internal fixation    Findings/Key Components: See Dictation     Estimated Blood Loss: 100 mL         Specimens:   Specimen (12h ago, onward)    None          Discharge Note    SUMMARY     Admit Date: 11/1/2018    Discharge Date and Time: 11/1/2018     Hospital Course : Patient Tolerated Procedure Well      Final Diagnosis: Post-Op Diagnosis Codes:     * Closed displaced fracture of lateral malleolus of right fibula, initial encounter [S82.61XA]    Disposition: Home or Self Care    Follow Up/Patient Instructions:     Medications:  Reconciled Home Medications:   Current Discharge Medication List      START taking these medications    Details   oxyCODONE-acetaminophen (PERCOCET) 5-325 mg per tablet Take 1-2 tablets by mouth every 6 (six) hours as needed for Pain.  Qty: 40 tablet, Refills: 0         CONTINUE these medications which have NOT CHANGED    Details   albuterol (PROVENTIL/VENTOLIN HFA) 90 mcg/actuation inhaler 1 puffs every 4 hours as needed for cough, wheeze, or shortness of breath  Qty: 1 Inhaler, Refills: 11    Associated Diagnoses: Mild persistent asthma with acute exacerbation      ammonium lactate (LAC-HYDRIN) 12 % lotion       ATROPINE SULFATE, PF, OPHT Apply to eye.      dorzolamide-timolol (COSOPT) 2-0.5 % ophthalmic solution Place 1 drop into both eyes 2 (two) times daily. Twice a day      fluticasone (FLONASE) 50  mcg/actuation nasal spray 1 spray (50 mcg total) by Each Nare route once daily.  Qty: 1 Bottle, Refills: 11    Associated Diagnoses: Chronic sinus complaints      fluticasone-vilanterol (BREO ELLIPTA) 200-25 mcg/dose DsDv diskus inhaler Inhale 1 puff into the lungs once daily.  Qty: 1 each, Refills: 11    Associated Diagnoses: Mild persistent asthma with acute exacerbation      FLUZONE HIGH-DOSE 2018-19, PF, 180 mcg/0.5 mL vaccine TO BE ADMINISTERED BY PHARMACIST FOR IMMUNIZATION  Refills: 0      gabapentin (NEURONTIN) 300 MG capsule TAKE ONE CAPSULE BY MOUTH ONCE DAILY IN THE EVENING  Qty: 30 capsule, Refills: 2      lorazepam (ATIVAN) 1 MG tablet TAKE 1 TABLET BY MOUTH DAILY  Qty: 30 tablet, Refills: 3    Comments: This request is for a new prescription for a controlled substance as required by Federal/State law..  Associated Diagnoses: Anxiety      metoprolol tartrate (LOPRESSOR) 25 MG tablet Take 1 tablet (25 mg total) by mouth 2 (two) times daily.  Qty: 60 tablet, Refills: 0      metroNIDAZOLE 0.75 % Lotn APPLY A PEA SIZED AMOUNT TO FACE DAILY  Refills: 2      montelukast (SINGULAIR) 10 mg tablet Take 1 tablet (10 mg total) by mouth every evening.  Qty: 30 tablet, Refills: 11    Associated Diagnoses: Mild persistent asthma with acute exacerbation; Chronic sinus complaints      pantoprazole (PROTONIX) 40 MG tablet Take 1 tablet (40 mg total) by mouth once daily.  Qty: 90 tablet, Refills: 3      predniSONE (DELTASONE) 5 MG tablet Take 2 tablets (10 mg total) by mouth 2 (two) times daily. may repeat for shortness of breath.  Qty: 36 tablet, Refills: 1    Associated Diagnoses: Mild persistent asthma with acute exacerbation      rosuvastatin (CRESTOR) 40 MG Tab TAKE 1 TABLET (40 MG TOTAL) BY MOUTH EVERY EVENING.  Qty: 90 tablet, Refills: 3    Associated Diagnoses: Cerebral infarction due to thrombosis of left carotid artery; Hyperlipidemia      spironolactone (ALDACTONE) 25 MG tablet Take 1 tablet (25 mg total) by  mouth once daily.  Qty: 90 tablet, Refills: 3    Associated Diagnoses: Hypertensive left ventricular hypertrophy, without heart failure; AVILA (dyspnea on exertion); Chronic diastolic heart failure; Microalbuminuria      VIIBRYD 40 mg Tab tablet Take 40 mg by mouth once daily.  Refills: 2      XARELTO 20 mg Tab TAKE 1 TABLET (20 MG TOTAL) BY MOUTH DAILY WITH DINNER OR EVENING MEAL.  Qty: 30 tablet, Refills: 11         STOP taking these medications       HYDROcodone-acetaminophen (NORCO) 5-325 mg per tablet Comments:   Reason for Stopping:             Discharge Procedure Orders   Diet general     Sponge bath only until clinic visit     Ice to affected area     No driving, operating heavy equipment or signing legal documents while taking pain medication     Call MD for:  temperature >100.4     Call MD for:  persistent nausea and vomiting     Call MD for:  severe uncontrolled pain     Call MD for:  difficulty breathing, headache or visual disturbances     Call MD for:  redness, tenderness, or signs of infection (pain, swelling, redness, odor or green/yellow discharge around incision site)     Call MD for:  hives     Call MD for:  persistent dizziness or light-headedness     Call MD for:  extreme fatigue     Leave dressing on - Keep it clean, dry, and intact until clinic visit     Non weight bearing     Follow-up Information     Wilfredo Aguero MD In 2 weeks.    Specialties:  Sports Medicine, Orthopedic Surgery  Contact information:  [address] [place] [id number]  [phone]                   Dictation #1  MRN:[id number]  CSN:[id number]  [id number]

## 2018-11-02 ENCOUNTER — TELEPHONE (OUTPATIENT)
Dept: ORTHOPEDICS | Facility: CLINIC | Age: 73
End: 2018-11-02

## 2018-11-02 VITALS
RESPIRATION RATE: 16 BRPM | SYSTOLIC BLOOD PRESSURE: 107 MMHG | HEART RATE: 72 BPM | HEIGHT: 67 IN | DIASTOLIC BLOOD PRESSURE: 53 MMHG | BODY MASS INDEX: 20.88 KG/M2 | OXYGEN SATURATION: 98 % | TEMPERATURE: 98 F | WEIGHT: 133 LBS

## 2018-11-02 NOTE — TELEPHONE ENCOUNTER
Called pt, she is post op day one from ORIF ankle.  No answer, LVM asking her to call for any question/need.

## 2018-11-06 ENCOUNTER — TELEPHONE (OUTPATIENT)
Dept: PULMONOLOGY | Facility: CLINIC | Age: 73
End: 2018-11-06

## 2018-11-06 DIAGNOSIS — B37.9 YEAST INFECTION: Primary | ICD-10-CM

## 2018-11-06 RX ORDER — NYSTATIN 100000 [USP'U]/ML
6 SUSPENSION ORAL 4 TIMES DAILY
Qty: 240 ML | Refills: 2 | Status: SHIPPED | OUTPATIENT
Start: 2018-11-06 | End: 2019-01-28

## 2018-11-06 NOTE — TELEPHONE ENCOUNTER
----- Message from Shelia Wood sent at 11/6/2018  3:05 PM CST -----  Contact: self  Patient need to speak to nurse regarding patient has thrush in her mouth and need advice on what can be done       Please call to advice [phone]

## 2018-11-06 NOTE — TELEPHONE ENCOUNTER
Spoke with pt, states she has multiple stores on the inside of the bottom lip. Could this be from her inhaler. Asking what she can do for this?

## 2018-11-12 PROBLEM — J45.30 MILD PERSISTENT ASTHMA WITHOUT COMPLICATION: Status: ACTIVE | Noted: 2018-11-12

## 2018-11-12 RX ORDER — ALBUTEROL SULFATE 90 UG/1
AEROSOL, METERED RESPIRATORY (INHALATION)
Qty: 1 INHALER | Refills: 11 | Status: SHIPPED | OUTPATIENT
Start: 2018-11-12 | End: 2019-01-25

## 2018-11-15 DIAGNOSIS — M25.571 RIGHT ANKLE PAIN, UNSPECIFIED CHRONICITY: Primary | ICD-10-CM

## 2018-11-16 ENCOUNTER — HOSPITAL ENCOUNTER (OUTPATIENT)
Dept: RADIOLOGY | Facility: HOSPITAL | Age: 73
Discharge: HOME | End: 2018-11-16
Attending: ORTHOPAEDIC SURGERY
Payer: MEDICARE

## 2018-11-16 ENCOUNTER — OFFICE VISIT (OUTPATIENT)
Dept: ORTHOPEDICS | Facility: CLINIC | Age: 73
End: 2018-11-16
Payer: MEDICARE

## 2018-11-16 VITALS
WEIGHT: 132.94 LBS | HEART RATE: 69 BPM | DIASTOLIC BLOOD PRESSURE: 61 MMHG | HEIGHT: 67 IN | SYSTOLIC BLOOD PRESSURE: 133 MMHG | BODY MASS INDEX: 20.87 KG/M2

## 2018-11-16 DIAGNOSIS — S82.61XD CLOSED DISPLACED FRACTURE OF LATERAL MALLEOLUS OF RIGHT FIBULA WITH ROUTINE HEALING, SUBSEQUENT ENCOUNTER: Primary | ICD-10-CM

## 2018-11-16 DIAGNOSIS — M25.571 RIGHT ANKLE PAIN, UNSPECIFIED CHRONICITY: ICD-10-CM

## 2018-11-16 PROCEDURE — 73610 X-RAY EXAM OF ANKLE: CPT | Mod: 26,RT,, | Performed by: RADIOLOGY

## 2018-11-16 PROCEDURE — 99213 OFFICE O/P EST LOW 20 MIN: CPT | Mod: PBBFAC,25,PN | Performed by: ORTHOPAEDIC SURGERY

## 2018-11-16 PROCEDURE — 73610 X-RAY EXAM OF ANKLE: CPT | Mod: TC,PN,RT

## 2018-11-16 PROCEDURE — 99999 PR PBB SHADOW E&M-EST. PATIENT-LVL III: CPT | Mod: PBBFAC,,, | Performed by: ORTHOPAEDIC SURGERY

## 2018-11-16 PROCEDURE — 99024 POSTOP FOLLOW-UP VISIT: CPT | Mod: POP,,, | Performed by: ORTHOPAEDIC SURGERY

## 2018-11-22 NOTE — PROGRESS NOTES
Past Medical History:   Diagnosis Date    Anticoagulant long-term use     Anxiety     Arthritis     Atrial fibrillation     Cancer     skin    CHF (congestive heart failure)     Depression     DVT (deep venous thrombosis)     GERD (gastroesophageal reflux disease)     Glaucoma (increased eye pressure)     Hyperlipidemia     diet controlled    Hypertension     Interstitial lung disease     Mild persistent asthma without complication 11/12/2018    Pneumonia 1/31/2014    Stroke 6-3-14    Stroke     TIA (transient ischemic attack)     TIA (transient ischemic attack)        Past Surgical History:   Procedure Laterality Date    2 heart ablations      3 in total    ABLATION N/A 9/7/2017    Per[facility] C[facility] LAB    bilateral cataracts      BLOCK, NERVE, FACET JOINT, MEDIAL BRANCH, CERVICAL Right 6/7/2018    Per[facility] by Galo Clancy [facility] at [facility] OR    BLOCK-NERVE-MEDIAL BRANCH-LUMBAR Bilateral 3/6/2018    Performed by [facility] Cl[facility] at [facility] OR    BRONCHOSCOPY N/A 5/1/2014    Performed by Jose Eduardo White MD at [facility] ENDO    CHOLECYSTECTOMY      CHOLECYSTECTOMY- open N/A 8/31/2016    Performed by Lavon Gonsalez MD at [facility] OR    COLONOSCOPY N/A 1/19/2017    Procedure: COLONOSCOPY and EGD;  Surgeon: Jonathan Schultz MD;  Location: [facility] ENDO;  Service: Endoscopy;  Laterality: N/A;    COLONOSCOPY and EGD N/A 1/19/2017    Performed by Jonathan Schultz MD at [facility] ENDO    ESOPHAGOGASTRODUODENOSCOPY (EGD) N/A 1/19/2017    Performed by Jonathan Schultz MD at [facility] ENDO    INJECTION OF ANESTHETIC AGENT AROUND MEDIAL BRANCH NERVES INNERVATING CERVICAL FACET JOINT Right 6/7/2018    Procedure: BLOCK, NERVE, FACET JOINT, MEDIAL BRANCH, CERVICAL;  Surgeon: Galo Clancy MD;  Location: [facility] OR;  Service: Pain Management;  Laterality: Right;  C4, 5, 6    INJECTION-STEROID-EPIDURAL-CERVICAL N/A 4/10/2018    Performed by [facility] Cl[facility] at [facility] OR    INJECTION-STEROID-EPIDURAL-CERVICAL N/A  1/29/2018    Performed by Galo Clancy MD at [facility] OR    OPEN REDUCTION AND INTERNAL FIXATION (ORIF) OF INJURY OF ANKLE Right 11/1/2018    Procedure: ORIF, ANKLE;  Surgeon: Wilfredo Aguero MD;  Location: [facility] OR;  Service: Orthopedics;  Laterality: Right;    ORIF, ANKLE Right 11/1/2018    Performed by Wilfredo Aguero MD at [facility] OR    pyloristenosis      RADIOFREQUENCY ABLATION OF LUMBAR MEDIAL BRANCH NERVE AT SINGLE LEVEL Bilateral 9/21/2018    Procedure: RADIOFREQUENCY ABLATION, NERVE, SPINAL, LUMBAR, MEDIAL BRANCH, 1 LEVEL;  Surgeon: Galo Clancy MD;  Location: [facility] OR;  Service: Pain Management;  Laterality: Bilateral;  L3, 4, 5    RADIOFREQUENCY ABLATION, NERVE, SPINAL, LUMBAR, MEDIAL BRANCH, 1 LEVEL Bilateral 9/21/2018    Performed by Galo Clancy MD at [facility] OR    RADIOFREQUENCY THERMAL COAGULATION OF MEDIAL BRANCH OF POSTERIOR RAMUS OF CERVICAL SPINAL NERVE Right 7/3/2018    Procedure: RADIOFREQUENCY THERMAL COAGULATION, NERVE, SPINAL, CERVICAL, MEDIAL BRANCH OF POSTERIOR RAMUS;  Surgeon: Galo Clancy MD;  Location: [facility] OR;  Service: Pain Management;  Laterality: Right;  C4,5,6 - Burned at 80 degrees C. for 75 seconds each site    RADIOFREQUENCY THERMAL COAGULATION, NERVE, SPINAL, CERVICAL, MEDIAL BRANCH OF POSTERIOR RAMUS Right 7/3/2018    Performed by Galo Clancy MD at [facility] OR    RADIOFREQUENCY THERMOCOAGULATION (RFTC)-NERVE-MEDIAN BRANCH-LUMBAR Bilateral 3/12/2018    Performed by Galo Clancy MD at [facility] OR    skin cancer removal       TONSILLECTOMY      TRANSESOPHAGEAL ECHOCARDIOGRAM (FELICIA) N/A 9/7/2017    Performed by Fco Calderon MD at [facility] CATH LAB       Current Outpatient Medications   Medication Sig    albuterol (PROVENTIL/VENTOLIN HFA) 90 mcg/actuation inhaler 2 puffs every 4 hours as needed for cough, wheeze, or shortness of breath    ammonium lactate (LAC-HYDRIN) 12 % lotion     ATROPINE SULFATE, PF, OPHT Apply to eye.    dorzolamide-timolol (COSOPT) 2-0.5 % ophthalmic solution Place  1 drop into both eyes 2 (two) times daily. Twice a day    fluticasone (FLONASE) 50 mcg/actuation nasal spray 1 spray (50 mcg total) by Each Nare route once daily.    fluticasone-vilanterol (BREO ELLIPTA) 200-25 mcg/dose DsDv diskus inhaler Inhale 1 puff into the lungs once daily.    FLUZONE HIGH-DOSE 2018-19, PF, 180 mcg/0.5 mL vaccine TO BE ADMINISTERED BY PHARMACIST FOR IMMUNIZATION    gabapentin (NEURONTIN) 300 MG capsule TAKE ONE CAPSULE BY MOUTH ONCE DAILY IN THE EVENING (Patient taking differently: TAKE TWO CAPSULE BY MOUTH ONCE DAILY IN THE EVENING)    lorazepam (ATIVAN) 1 MG tablet TAKE 1 TABLET BY MOUTH DAILY (Patient taking differently: TAKE 1 TABLET BY MOUTH DAILY PRN)    metoprolol tartrate (LOPRESSOR) 25 MG tablet Take 1 tablet (25 mg total) by mouth 2 (two) times daily.    metroNIDAZOLE 0.75 % Lotn APPLY A PEA SIZED AMOUNT TO FACE DAILY    montelukast (SINGULAIR) 10 mg tablet Take 1 tablet (10 mg total) by mouth every evening.    nystatin (MYCOSTATIN) 100,000 unit/mL suspension Take 6 mLs (600,000 Units total) by mouth 4 (four) times daily.    oxyCODONE-acetaminophen (PERCOCET) 5-325 mg per tablet Take 1-2 tablets by mouth every 6 (six) hours as needed for Pain.    rosuvastatin (CRESTOR) 40 MG Tab TAKE 1 TABLET (40 MG TOTAL) BY MOUTH EVERY EVENING. (Patient taking differently: Take 20 mg by mouth every evening. )    spironolactone (ALDACTONE) 25 MG tablet Take 1 tablet (25 mg total) by mouth once daily.    VIIBRYD 40 mg Tab tablet Take 40 mg by mouth once daily.    XARELTO 20 mg Tab TAKE 1 TABLET (20 MG TOTAL) BY MOUTH DAILY WITH DINNER OR EVENING MEAL.    pantoprazole (PROTONIX) 40 MG tablet Take 1 tablet (40 mg total) by mouth once daily. (Patient taking differently: Take 40 mg by mouth once daily. )    predniSONE (DELTASONE) 5 MG tablet Take 2 tablets (10 mg total) by mouth 2 (two) times daily. may repeat for shortness of breath.     No current facility-administered medications for  this visit.        Review of patient's allergies indicates:   Allergen Reactions    Atorvastatin      Other reaction(s): Joint pain    Augmentin [amoxicillin-pot clavulanate]      Other reaction(s): Mental Status Change    Baclofen (bulk) Nausea And Vomiting    Ciprofloxacin (bulk)     Decongest tabs      Other reaction(s): increased heart rate    Erythromycin Other (See Comments)    Flecainide Hives     And SOB. No reaction to Lidocaine     Fluoxetine      Other reaction(s): heart palpitations  Other reaction(s): anxiety    Lisinopril Other (See Comments)     cough    Losartan Other (See Comments)     Hypotension with lightheadedness    Morphine Other (See Comments)     confusion    Tramadol Other (See Comments)     SOB, low BP    Venlafaxine analogues      Changes in BP and increases heart rate       Afrin [oxymetazoline] Palpitations    Caffeine Palpitations    Tizanidine Anxiety     dizziness       Family History   Problem Relation Age of Onset    Heart disease Father     Ulcers Father     Arthritis Mother     Asthma Mother     Rheum arthritis Mother     Pneumonia Mother     Depression Son     Alzheimer's disease Maternal Uncle     Rheum arthritis Maternal Grandmother     Emphysema Maternal Grandfather     Cancer Maternal Grandfather         kidney    Kidney disease Maternal Grandfather     Cancer Paternal Grandmother         lung= smoker    Pneumonia Paternal Grandfather     Breast cancer Neg Hx     Ovarian cancer Neg Hx        Social History     Socioeconomic History    Marital status:      Spouse name: Not on file    Number of children: Not on file    Years of education: Not on file    Highest education level: Not on file   Social Needs    Financial resource strain: Not on file    Food insecurity - worry: Not on file    Food insecurity - inability: Not on file    Transportation needs - medical: Not on file    Transportation needs - non-medical: Not on file    Occupational History    Not on file   Tobacco Use    Smoking status: Never Smoker    Smokeless tobacco: Never Used   Substance and Sexual Activity    Alcohol use: No    Drug use: No    Sexual activity: Yes     Partners: Male   Other Topics Concern    Not on file   Social History Narrative    Not on file       Chief Complaint:   Chief Complaint   Patient presents with    Post-op Evaluation     S/P ORIF RT ANKLE       Consulting Physician: Self, Aaareferral    History of present illness: This is a 73 y.o. female following up for right ankle lateral malleolus ORIF 11-1-18.      Review of Systems:    Constitution: Denies chills, fever, and sweats.    HENT: Denies headaches or blurry vision.    Cardiovascular: Denies chest pain or irregular heart beat.    Respiratory: Denies cough or shortness of breath.    Gastrointestinal: Denies abdominal pain, nausea, or vomiting.    Musculoskeletal:  Denies muscle cramps.    Neurological: Denies dizziness or focal weakness.    Psychiatric/Behavioral: Normal mental status.    Hematologic/Lymphatic: Denies bleeding problem or easy bruising/bleeding.    Skin: Denies rash or suspicious lesions.      Examination:    Vital Signs:    Vitals:    11/16/18 0952   BP: 133/61   Pulse: 69       Body mass index is 20.82 kg/m².    This a well-developed, well nourished patient in no acute distress.    Alert and oriented and cooperative to examination.       Physical Exam: right ankle    Inspection/Observation   Swelling:   mild  Erythema:   none  Bruising:   none  Wounds:   Clean and dry  Drainage:  None    Range of Motion   Limited    Muscle Strength   Limited    Other   Sensation:  normal  Pulse:    palpable    Imaging: Normal post-operative XR     Assessment: Closed displaced fracture of lateral malleolus of right fibula with routine healing, subsequent encounter        Plan: Doing well. Into boot. Non-WB. Back in 4 weeks.      DISCLAIMER: This note may have been dictated using voice  recognition software and may contain grammatical errors. Report sent to referring provider as required.

## 2018-11-27 ENCOUNTER — TELEPHONE (OUTPATIENT)
Dept: CARDIOLOGY | Facility: CLINIC | Age: 73
End: 2018-11-27

## 2018-11-27 NOTE — TELEPHONE ENCOUNTER
Called pt to michael 12/7/18 apt to morning apt.  Adv pt to call back to michael to a morning time.

## 2018-11-30 ENCOUNTER — PATIENT MESSAGE (OUTPATIENT)
Dept: FAMILY MEDICINE | Facility: CLINIC | Age: 73
End: 2018-11-30

## 2018-11-30 ENCOUNTER — OFFICE VISIT (OUTPATIENT)
Dept: CARDIOLOGY | Facility: CLINIC | Age: 73
End: 2018-11-30
Payer: MEDICARE

## 2018-11-30 VITALS
BODY MASS INDEX: 20.72 KG/M2 | DIASTOLIC BLOOD PRESSURE: 69 MMHG | SYSTOLIC BLOOD PRESSURE: 123 MMHG | HEIGHT: 67 IN | WEIGHT: 132 LBS | HEART RATE: 52 BPM

## 2018-11-30 DIAGNOSIS — G45.9 TIA (TRANSIENT ISCHEMIC ATTACK): ICD-10-CM

## 2018-11-30 DIAGNOSIS — Z98.890 S/P ABLATION OF ATRIAL FLUTTER: ICD-10-CM

## 2018-11-30 DIAGNOSIS — Z86.79 S/P ABLATION OF ATRIAL FLUTTER: ICD-10-CM

## 2018-11-30 DIAGNOSIS — Z86.73 H/O: CVA (CEREBROVASCULAR ACCIDENT): ICD-10-CM

## 2018-11-30 DIAGNOSIS — Z86.79 S/P ABLATION OF ATRIAL FIBRILLATION: Primary | ICD-10-CM

## 2018-11-30 DIAGNOSIS — J45.30 MILD PERSISTENT ASTHMA WITHOUT COMPLICATION: ICD-10-CM

## 2018-11-30 DIAGNOSIS — I95.2 HYPOTENSION DUE TO DRUGS: ICD-10-CM

## 2018-11-30 DIAGNOSIS — I48.92 ATRIAL FLUTTER, UNSPECIFIED TYPE: ICD-10-CM

## 2018-11-30 DIAGNOSIS — Z98.890 S/P ABLATION OF ATRIAL FIBRILLATION: Primary | ICD-10-CM

## 2018-11-30 DIAGNOSIS — I49.3 VPC (VENTRICULAR PREMATURE COMPLEX): ICD-10-CM

## 2018-11-30 DIAGNOSIS — I48.0 PAROXYSMAL ATRIAL FIBRILLATION: ICD-10-CM

## 2018-11-30 PROCEDURE — 93005 ELECTROCARDIOGRAM TRACING: CPT | Mod: PBBFAC,PO | Performed by: INTERNAL MEDICINE

## 2018-11-30 PROCEDURE — 99999 PR PBB SHADOW E&M-EST. PATIENT-LVL III: CPT | Mod: PBBFAC,,, | Performed by: INTERNAL MEDICINE

## 2018-11-30 PROCEDURE — 93010 ELECTROCARDIOGRAM REPORT: CPT | Mod: S$PBB,,, | Performed by: INTERNAL MEDICINE

## 2018-11-30 PROCEDURE — 99213 OFFICE O/P EST LOW 20 MIN: CPT | Mod: PBBFAC,PO | Performed by: INTERNAL MEDICINE

## 2018-11-30 PROCEDURE — 99214 OFFICE O/P EST MOD 30 MIN: CPT | Mod: S$PBB,,, | Performed by: INTERNAL MEDICINE

## 2018-11-30 NOTE — PROGRESS NOTES
aSubjective:      Cardiologist: Barrett Jurado MD    I had the pleasure of seeing Ayla Dela Cruz in follow up for her history of atrial arrhythmias and PVI. She is a 73 year old female with a history of HTN, HLD, and TIA in 2014, whose history of arrhythmias dates back to her 30s when she began to experience sudden onset palpitations. She was started on Tambacor at that time, which improved her symptoms. She was started on Coumadin at age 55 years. In the mid-1990s, she underwent an ablation for what sounds like atrial flutter in [place], [place]. In 1997 she underwent a second ablation which she was told was unsuccessful. Since that time she has undergone two electrical cardioversions, once in the mid-2000s (which lasted 5 years), and another around 2010. Around 2014 her arrhythmias recurred around the time she had her TIA. She was started on Xarelto and restarted on Tambacor at that time. Notably, on 100 mg bid she was having AF recurrences and SOB. The dose was lowered to 50 mg bid and eventually stopped. When I initially saw her she was only on Metoprolol.    Recent cardiac studies include an echo from 10/2016 which showed an EF of 65-70%, normal LA size, and a PASP of 64 mmHg (EF 40% in 11/2014).    I reviewed all ECGs in the EMR at her initial office visit. ECGs from 2865-6663 showed sinus rhythm. ECGs from 1/2014 and 4/2014 showed AF. ECGs between 6/2014 and 1/2016 showed sinus bradycardia and NSR. All ECGs between 1/16/2016 and 5/2017 showed either AF or AFL. When in AFL, RVR was generally seen.    In 9/2017, a PVI was performed. All four PVs were reconnected from a prior PVI, and successfully reisolated. A septal MA flutter line was also created due to frequent inductions during the case. The existing CTI-line was found to be intact. She was discharged on Amiodarone 100 mg daily. At her 11/2017 follow-up, Ms. Dela Cruz was feeling well apart from occasional AVILA relieved with lasix. Her energy level  had vastly improved since pre-ablation. An ECG showed sinus rhythm. Amiodarone was stopped at that visit (I was concerned it was causing her intermittent AVILA). Stopping Amiodarone improved these symptoms. A 48 hour Holter monitor performed in 11/2017 showed sinus rhythm with an average HR of 58 bpm.     At her 2/2018 visit Ms. Dela Cruz continued to feel well, with no complaints. However, beginning in early 8/2018, Ms. Dela Cruz began to note significant AVILA, lightheadedness, and dizziness, which temporally correlated with her starting Tramadol. She had been off this medication for a few days. She also cut losartan from 100 mg to 50 mg once daily on her own. At her 9/2018 visit I reviewed recent HR and BP trends, with SBPs frequently in the 80-90s mmHg range. At that visit I stopped losartan. A 48 hour Holter monitor showed sinus rhythm with an average HR of 63 bpm, with a 6.6% PAC burden.     Since her last visit Ms. Dela Cruz had an episode of near syncope in [place], which resulted in a right ankle break for which she required surgery. Otherwise she is overall feeling much better, with improved breathing and lightheadedness.    My interpretation of today's ECG is sinus rhythm with frequent PACs.    Review of Systems   Constitution: Negative for decreased appetite, malaise/fatigue, weight gain and weight loss.   HENT: Negative for sore throat.    Eyes: Negative for blurred vision.   Cardiovascular: Negative for chest pain, dyspnea on exertion, irregular heartbeat, leg swelling, near-syncope, orthopnea, palpitations, paroxysmal nocturnal dyspnea and syncope.   Respiratory: Negative for shortness of breath.    Skin: Negative for rash.   Musculoskeletal: Negative for arthritis.   Gastrointestinal: Negative for abdominal pain.   Neurological: Negative for focal weakness and light-headedness.   Psychiatric/Behavioral: Negative for altered mental status.        Objective:    Physical Exam   Constitutional: She is  oriented to person, place, and time. She appears well-developed and well-nourished. No distress.   HENT:   Head: Normocephalic and atraumatic.   Mouth/Throat: Oropharynx is clear and moist.   Eyes: Conjunctivae are normal. Pupils are equal, round, and reactive to light. No scleral icterus.   Neck: Normal range of motion. Neck supple. No JVD present. No thyromegaly present.   Cardiovascular: Normal rate, regular rhythm, normal heart sounds and intact distal pulses. Exam reveals no gallop and no friction rub.   No murmur heard.  Pulmonary/Chest: Effort normal and breath sounds normal. No respiratory distress. She has no rales.   Abdominal: Soft. Bowel sounds are normal. She exhibits no distension.   Musculoskeletal: She exhibits no edema.   Neurological: She is alert and oriented to person, place, and time.   Skin: Skin is warm and dry.   Psychiatric: She has a normal mood and affect. Her behavior is normal.   Vitals reviewed.        Assessment:       1. S/P ablation of atrial fibrillation    2. S/P ablation of atrial flutter    3. Hypotension due to drugs    4. VPC (ventricular premature complex)    5. Paroxysmal atrial fibrillation, ablations X 2 1995, 1997, CHADS-VAS score 5, HAS-BLED score 5     6. Mild persistent asthma without complication    7. H/O: CVA (cerebrovascular accident), June 2014    8. TIA (transient ischemic attack), 11/3/2014         Plan:   In summary, Ayla Dela Cruz is a 73 year old female with a longstanding history of atrial arrhythmias and prior ablations at outside institutions. Her [id number] Score is 5 (age, female, TIA, HTN) so she should remain on anticoagulation indefinitely. She is now 13 months post-PVI and MA flutter line, and maintaining sinus rhythm off Amiodarone. She is feeling much better since her recent medication changes. The plan is to hold the course, and see me again in 6 months.    Thank you for allowing me to participate in the care of this patient. Please do  not hesitate to call me with any questions or concerns.

## 2018-12-04 RX ORDER — GABAPENTIN 300 MG/1
CAPSULE ORAL
Qty: 30 CAPSULE | Refills: 2 | Status: SHIPPED | OUTPATIENT
Start: 2018-12-04 | End: 2019-03-13 | Stop reason: SDUPTHER

## 2018-12-13 DIAGNOSIS — M25.571 RIGHT ANKLE PAIN, UNSPECIFIED CHRONICITY: Primary | ICD-10-CM

## 2018-12-14 ENCOUNTER — PATIENT MESSAGE (OUTPATIENT)
Dept: CARDIOLOGY | Facility: CLINIC | Age: 73
End: 2018-12-14

## 2018-12-14 ENCOUNTER — HOSPITAL ENCOUNTER (OUTPATIENT)
Dept: RADIOLOGY | Facility: HOSPITAL | Age: 73
Discharge: HOME | End: 2018-12-14
Attending: ORTHOPAEDIC SURGERY
Payer: MEDICARE

## 2018-12-14 ENCOUNTER — OFFICE VISIT (OUTPATIENT)
Dept: ORTHOPEDICS | Facility: CLINIC | Age: 73
End: 2018-12-14
Payer: MEDICARE

## 2018-12-14 VITALS
BODY MASS INDEX: 20.73 KG/M2 | DIASTOLIC BLOOD PRESSURE: 65 MMHG | SYSTOLIC BLOOD PRESSURE: 141 MMHG | WEIGHT: 132.06 LBS | HEART RATE: 58 BPM | HEIGHT: 67 IN

## 2018-12-14 DIAGNOSIS — M25.571 RIGHT ANKLE PAIN, UNSPECIFIED CHRONICITY: ICD-10-CM

## 2018-12-14 DIAGNOSIS — S82.61XD CLOSED DISPLACED FRACTURE OF LATERAL MALLEOLUS OF RIGHT FIBULA WITH ROUTINE HEALING, SUBSEQUENT ENCOUNTER: Primary | ICD-10-CM

## 2018-12-14 DIAGNOSIS — I10 HYPERTENSION, UNSPECIFIED TYPE: Primary | ICD-10-CM

## 2018-12-14 PROCEDURE — 73610 X-RAY EXAM OF ANKLE: CPT | Mod: 26,RT,, | Performed by: RADIOLOGY

## 2018-12-14 PROCEDURE — 99213 OFFICE O/P EST LOW 20 MIN: CPT | Mod: PBBFAC,25,PN | Performed by: ORTHOPAEDIC SURGERY

## 2018-12-14 PROCEDURE — 99999 PR PBB SHADOW E&M-EST. PATIENT-LVL III: CPT | Mod: PBBFAC,,, | Performed by: ORTHOPAEDIC SURGERY

## 2018-12-14 PROCEDURE — 99024 POSTOP FOLLOW-UP VISIT: CPT | Mod: POP,,, | Performed by: ORTHOPAEDIC SURGERY

## 2018-12-14 PROCEDURE — 73610 X-RAY EXAM OF ANKLE: CPT | Mod: TC,PN,RT

## 2018-12-14 RX ORDER — METOPROLOL TARTRATE 25 MG/1
25 TABLET, FILM COATED ORAL 2 TIMES DAILY
Qty: 60 TABLET | Refills: 9 | Status: SHIPPED | OUTPATIENT
Start: 2018-12-14 | End: 2019-01-13

## 2018-12-14 NOTE — PROGRESS NOTES
Past Medical History:   Diagnosis Date    Anticoagulant long-term use     Anxiety     Arthritis     Atrial fibrillation     Cancer     skin    CHF (congestive heart failure)     Depression     DVT (deep venous thrombosis)     GERD (gastroesophageal reflux disease)     Glaucoma (increased eye pressure)     Hyperlipidemia     diet controlled    Hypertension     Interstitial lung disease     Mild persistent asthma without complication 11/12/2018    Pneumonia 1/31/2014    Stroke 6-3-14    Stroke     TIA (transient ischemic attack)     TIA (transient ischemic attack)        Past Surgical History:   Procedure Laterality Date    2 heart ablations      3 in total    ABLATION N/A 9/7/2017    Performed by Fco Calderon [facility] at [facility] CATH LAB    bilateral cataracts      BLOCK, NERVE, FACET JOINT, MEDIAL BRANCH, CERVICAL Right 6/7/2018    Performed by Galo Clancy MD at [facility] OR    BLOCK-NERVE-MEDIAL BRANCH-LUMBAR Bilateral 3/6/2018    Performed by Galo Clancy MD at [facility] OR    BRONCHOSCOPY N/A 5/1/2014    Performed by Jose Eduardo White MD at [facility] ENDO    CHOLECYSTECTOMY      CHOLECYSTECTOMY- open N/A 8/31/2016    Performed by Lavon Gonsalez MD at [facility] OR    COLONOSCOPY N/A 1/19/2017    Procedure: COLONOSCOPY and EGD;  Surgeon: Jonathan Schultz MD;  Location: [facility] ENDO;  Service: Endoscopy;  Laterality: N/A;    COLONOSCOPY and EGD N/A 1/19/2017    Performed by Jonathan Schultz MD at [facility] ENDO    ESOPHAGOGASTRODUODENOSCOPY (EGD) N/A 1/19/2017    Performed by Jonathan Schultz MD at [facility] ENDO    INJECTION OF ANESTHETIC AGENT AROUND MEDIAL BRANCH NERVES INNERVATING CERVICAL FACET JOINT Right 6/7/2018    Procedure: BLOCK, NERVE, FACET JOINT, MEDIAL BRANCH, CERVICAL;  Surgeon: Galo Clancy MD;  Location: [facility] OR;  Service: Pain Management;  Laterality: Right;  C4, 5, 6    INJECTION-STEROID-EPIDURAL-CERVICAL N/A 4/10/2018    Performed by Galo Clancy MD at [facility] OR    INJECTION-STEROID-EPIDURAL-CERVICAL N/A  1/29/2018    [facility]    OPEN REDUCTION AND INTERNAL FIXATION (ORIF) OF INJURY OF ANKLE Right 11/1/2018    Procedure: ORIF, ANKLE;  Surgeon: Wilfredo Aguero MD;  Location: [facility] OR;  Service: Orthopedics;  Laterality: Right;    ORIF, ANKLE Right 11/1/2018    Performed by Wilfredo [facility]ero MD at [facility] OR    pyloristenosis      RADIOFREQUENCY ABLATION OF LUMBAR MEDIAL BRANCH NERVE AT SINGLE LEVEL Bilateral 9/21/2018    Procedure: RADIOFREQUENCY ABLATION, NERVE, SPINAL, LUMBAR, MEDIAL BRANCH, 1 LEVEL;  Surgeon: Galo Clancy MD;  [facility];  Service: Pain Management;  Laterality: Bilateral;  L3, 4, 5    RADIOFREQUENCY ABLATION, NERVE, SPINAL, LUMBAR, MEDIAL BRANCH, 1 LEVEL Bilateral 9/21/2018    [facility]    RADIOFREQUENCY THERMAL COAGULATION OF MEDIAL BRANCH OF POSTERIOR RAMUS OF CERVICAL SPINAL NERVE Right 7/3/2018    Procedure: RADIOFREQUENCY THERMAL COAGULATION, NERVE, SPINAL, CERVICAL, MEDIAL BRANCH OF POSTERIOR RAMUS;  Surgeon: Galo Clancy MD;  [facility];  Service: Pain Management;  Laterality: Right;  C4,5,6 - Burned at 80 degrees C. for 75 seconds each site    RADIOFREQUENCY THERMAL COAGULATION, NERVE, SPINAL, CERVICAL, MEDIAL BRANCH OF POSTERIOR RAMUS Right 7/3/2018    [facility]    RADIOFREQUENCY THERMOCOAGULATION (RFTC)-NERVE-MEDIAN BRANCH-LUMBAR Bilateral 3/12/2018    [facility]    skin cancer removal       TONSILLECTOMY      TRANSESOPHAGEAL ECHOCARDIOGRAM (FELICIA) N/A 9/7/2017    Performed by Fco Calderon MD at [facility] CATH LAB       Current Outpatient Medications   Medication Sig    albuterol (PROVENTIL/VENTOLIN HFA) 90 mcg/actuation inhaler 2 puffs every 4 hours as needed for cough, wheeze, or shortness of breath    ammonium lactate (LAC-HYDRIN) 12 % lotion     ATROPINE SULFATE, PF, OPHT Apply to eye.    dorzolamide-timolol (COSOPT) 2-0.5 % ophthalmic solution Place  1 drop into both eyes 2 (two) times daily. Twice a day    fluticasone (FLONASE) 50 mcg/actuation nasal spray 1 spray (50 mcg total) by Each Nare route once daily.    fluticasone-vilanterol (BREO ELLIPTA) 200-25 mcg/dose DsDv diskus inhaler Inhale 1 puff into the lungs once daily.    FLUZONE HIGH-DOSE 2018-19, PF, 180 mcg/0.5 mL vaccine TO BE ADMINISTERED BY PHARMACIST FOR IMMUNIZATION    gabapentin (NEURONTIN) 300 MG capsule TAKE ONE CAPSULE BY MOUTH ONCE DAILY IN THE EVENING    lorazepam (ATIVAN) 1 MG tablet TAKE 1 TABLET BY MOUTH DAILY (Patient taking differently: TAKE 1 TABLET BY MOUTH DAILY PRN)    metroNIDAZOLE 0.75 % Lotn APPLY A PEA SIZED AMOUNT TO FACE DAILY    montelukast (SINGULAIR) 10 mg tablet Take 1 tablet (10 mg total) by mouth every evening.    nystatin (MYCOSTATIN) 100,000 unit/mL suspension Take 6 mLs (600,000 Units total) by mouth 4 (four) times daily.    predniSONE (DELTASONE) 5 MG tablet Take 2 tablets (10 mg total) by mouth 2 (two) times daily. may repeat for shortness of breath.    rosuvastatin (CRESTOR) 40 MG Tab TAKE 1 TABLET (40 MG TOTAL) BY MOUTH EVERY EVENING. (Patient taking differently: Take 20 mg by mouth every evening. )    spironolactone (ALDACTONE) 25 MG tablet Take 1 tablet (25 mg total) by mouth once daily.    VIIBRYD 40 mg Tab tablet Take 40 mg by mouth once daily.    XARELTO 20 mg Tab TAKE 1 TABLET (20 MG TOTAL) BY MOUTH DAILY WITH DINNER OR EVENING MEAL.    metoprolol tartrate (LOPRESSOR) 25 MG tablet Take 1 tablet (25 mg total) by mouth 2 (two) times daily.    pantoprazole (PROTONIX) 40 MG tablet Take 1 tablet (40 mg total) by mouth once daily. (Patient taking differently: Take 40 mg by mouth once daily. )     No current facility-administered medications for this visit.        Review of patient's allergies indicates:   Allergen Reactions    Atorvastatin      Other reaction(s): Joint pain    Augmentin [amoxicillin-pot clavulanate]      Other reaction(s): Mental  Status Change    Baclofen (bulk) Nausea And Vomiting    Ciprofloxacin (bulk)     Decongest tabs      Other reaction(s): increased heart rate    Erythromycin Other (See Comments)    Flecainide Hives     And SOB. No reaction to Lidocaine     Fluoxetine      Other reaction(s): heart palpitations  Other reaction(s): anxiety    Lisinopril Other (See Comments)     cough    Losartan Other (See Comments)     Hypotension with lightheadedness    Morphine Other (See Comments)     confusion    Tramadol Other (See Comments)     SOB, low BP    Venlafaxine analogues      Changes in BP and increases heart rate       Afrin [oxymetazoline] Palpitations    Caffeine Palpitations    Tizanidine Anxiety     dizziness       Family History   Problem Relation Age of Onset    Heart disease Father     Ulcers Father     Arthritis Mother     Asthma Mother     Rheum arthritis Mother     Pneumonia Mother     Depression Son     Alzheimer's disease Maternal Uncle     Rheum arthritis Maternal Grandmother     Emphysema Maternal Grandfather     Cancer Maternal Grandfather         kidney    Kidney disease Maternal Grandfather     Cancer Paternal Grandmother         lung= smoker    Pneumonia Paternal Grandfather     Breast cancer Neg Hx     Ovarian cancer Neg Hx        Social History     Socioeconomic History    Marital status:      Spouse name: Not on file    Number of children: Not on file    Years of education: Not on file    Highest education level: Not on file   Social Needs    Financial resource strain: Not on file    Food insecurity - worry: Not on file    Food insecurity - inability: Not on file    Transportation needs - medical: Not on file    Transportation needs - non-medical: Not on file   Occupational History    Not on file   Tobacco Use    Smoking status: Never Smoker    Smokeless tobacco: Never Used   Substance and Sexual Activity    Alcohol use: No    Drug use: No    Sexual activity: Yes      Partners: Male   Other Topics Concern    Not on file   Social History Narrative    Not on file       Chief Complaint:   Chief Complaint   Patient presents with    Post-op Evaluation     S/P RT ANKLE LATERAL MALLEOLUS ORIF 11/1/18       Consulting Physician: No ref. provider found    History of present illness: This is a 73 y.o. female following up for right ankle lateral malleolus ORIF 11-1-18.      Review of Systems:    Constitution: Denies chills, fever, and sweats.    HENT: Denies headaches or blurry vision.    Cardiovascular: Denies chest pain or irregular heart beat.    Respiratory: Denies cough or shortness of breath.    Gastrointestinal: Denies abdominal pain, nausea, or vomiting.    Musculoskeletal:  Denies muscle cramps.    Neurological: Denies dizziness or focal weakness.    Psychiatric/Behavioral: Normal mental status.    Hematologic/Lymphatic: Denies bleeding problem or easy bruising/bleeding.    Skin: Denies rash or suspicious lesions.      Examination:    Vital Signs:    Vitals:    12/14/18 1022   BP: (!) 141/65   Pulse: (!) 58       Body mass index is 20.68 kg/m².    This a well-developed, well nourished patient in no acute distress.    Alert and oriented and cooperative to examination.       Physical Exam: right ankle    Inspection/Observation   Swelling:   mild  Erythema:   none  Bruising:   none  Wounds:   healed  Drainage:  None    Range of Motion   improving    Muscle Strength   4/5    Other   Sensation:  normal  Pulse:    palpable    Imaging: Normal post-operative XR     Assessment: Closed displaced fracture of lateral malleolus of right fibula with routine healing, subsequent encounter        Plan: Doing well. DC boot. WBAT. Back in 6 weeks. Start PT.      DISCLAIMER: This note may have been dictated using voice recognition software and may contain grammatical errors. Report sent to referring provider as required.

## 2018-12-24 DIAGNOSIS — F41.9 ANXIETY: ICD-10-CM

## 2018-12-24 NOTE — TELEPHONE ENCOUNTER
----- Message from Elsi Burgos sent at 12/24/2018 10:26 AM CST -----  Type:  RX Refill Request    Who Called:  Patient  RX Name and Strength: lorazepam (ATIVAN) 1 MG tablet  Preferred Pharmacy with phone number:  [facility] at [phone]  Best Call Back Number:  [phone]  Additional Information:

## 2018-12-27 ENCOUNTER — TELEPHONE (OUTPATIENT)
Dept: CARDIOLOGY | Facility: CLINIC | Age: 73
End: 2018-12-27

## 2018-12-27 NOTE — TELEPHONE ENCOUNTER
----- Message from Barrett Jurado MD sent at 12/27/2018  7:49 AM CST -----  Please let her know the request from [facility] for Lorazepam I can not do right now because my RSA secure ID is not working for some reason. The is needed to e-Rx controlled drugs. I can only print it from the office when I am back. See if you can get Miladys to find out what I can do to get it to work.    Thanks, happy new year!  Dr. Jurado

## 2018-12-27 NOTE — TELEPHONE ENCOUNTER
----- Message from Sahara Antonio sent at 12/26/2018  4:40 PM CST -----  Contact: Patient  Type:  RX Refill Request    Who Called:  Patient  Refill or New Rx:  Refill  RX Name and Strength:  lorazepam (ATIVAN) 1 MG tablet  How is the patient currently taking it? (ex. 1XDay):    Is this a 30 day or 90 day RX:  90  Preferred Pharmacy with phone number:    [id number] - [place] - [address]  [address] [address]  Phone: [phone] Fax: [phone]  Local or Mail Order:  Local  Ordering Provider:  Adrien  Best Call Back Number:  [phone]  Additional Information:  Patient only has one pill left.

## 2018-12-27 NOTE — TELEPHONE ENCOUNTER
Call placed to Ms. Dela Cruz in regards to not being able to fill her lorazepam. No answer, and unable to leave message due to voicemail not being set up.

## 2018-12-31 RX ORDER — LORAZEPAM 1 MG/1
1 TABLET ORAL DAILY
Qty: 30 TABLET | Refills: 3 | OUTPATIENT
Start: 2018-12-31

## 2019-01-02 ENCOUNTER — TELEPHONE (OUTPATIENT)
Dept: OBSTETRICS AND GYNECOLOGY | Facility: CLINIC | Age: 74
End: 2019-01-02

## 2019-01-02 DIAGNOSIS — R92.8 ABNORMAL MAMMOGRAM OF LEFT BREAST: Primary | ICD-10-CM

## 2019-01-04 DIAGNOSIS — I11.0 LVH (LEFT VENTRICULAR HYPERTROPHY) DUE TO HYPERTENSIVE DISEASE, WITH HEART FAILURE: ICD-10-CM

## 2019-01-04 DIAGNOSIS — I48.0 PAROXYSMAL ATRIAL FIBRILLATION: ICD-10-CM

## 2019-01-04 DIAGNOSIS — R06.09 DOE (DYSPNEA ON EXERTION): ICD-10-CM

## 2019-01-04 DIAGNOSIS — I50.32 CHRONIC DIASTOLIC HEART FAILURE: ICD-10-CM

## 2019-01-04 RX ORDER — METOPROLOL TARTRATE 50 MG/1
50 TABLET ORAL 2 TIMES DAILY
Qty: 60 TABLET | Refills: 8 | Status: CANCELLED | OUTPATIENT
Start: 2019-01-04 | End: 2020-01-04

## 2019-01-04 RX ORDER — METOPROLOL TARTRATE 50 MG/1
50 TABLET ORAL 2 TIMES DAILY
Qty: 60 TABLET | Refills: 8 | Status: SHIPPED | OUTPATIENT
Start: 2019-01-04 | End: 2019-08-08 | Stop reason: SDUPTHER

## 2019-01-15 ENCOUNTER — OFFICE VISIT (OUTPATIENT)
Dept: PULMONOLOGY | Facility: CLINIC | Age: 74
End: 2019-01-15
Payer: MEDICARE

## 2019-01-15 VITALS
HEART RATE: 72 BPM | DIASTOLIC BLOOD PRESSURE: 68 MMHG | BODY MASS INDEX: 20.4 KG/M2 | WEIGHT: 130 LBS | SYSTOLIC BLOOD PRESSURE: 137 MMHG | OXYGEN SATURATION: 98 % | HEIGHT: 67 IN

## 2019-01-15 DIAGNOSIS — R06.09 DOE (DYSPNEA ON EXERTION): ICD-10-CM

## 2019-01-15 DIAGNOSIS — J45.30 MILD PERSISTENT ASTHMA WITHOUT COMPLICATION: Primary | ICD-10-CM

## 2019-01-15 DIAGNOSIS — J84.9 INTERSTITIAL LUNG DISEASE: ICD-10-CM

## 2019-01-15 PROCEDURE — 99213 PR OFFICE/OUTPT VISIT, EST, LEVL III, 20-29 MIN: ICD-10-PCS | Mod: S$PBB,,, | Performed by: INTERNAL MEDICINE

## 2019-01-15 PROCEDURE — 99999 PR PBB SHADOW E&M-EST. PATIENT-LVL V: ICD-10-PCS | Mod: PBBFAC,,, | Performed by: INTERNAL MEDICINE

## 2019-01-15 PROCEDURE — 99999 PR PBB SHADOW E&M-EST. PATIENT-LVL V: CPT | Mod: PBBFAC,,, | Performed by: INTERNAL MEDICINE

## 2019-01-15 PROCEDURE — 99215 OFFICE O/P EST HI 40 MIN: CPT | Mod: PBBFAC,PO | Performed by: INTERNAL MEDICINE

## 2019-01-15 PROCEDURE — 99213 OFFICE O/P EST LOW 20 MIN: CPT | Mod: S$PBB,,, | Performed by: INTERNAL MEDICINE

## 2019-01-15 RX ORDER — LEVALBUTEROL TARTRATE 45 UG/1
1-2 AEROSOL, METERED ORAL EVERY 4 HOURS PRN
Qty: 1 INHALER | Refills: 11 | Status: SHIPPED | OUTPATIENT
Start: 2019-01-15 | End: 2020-06-10 | Stop reason: ALTCHOICE

## 2019-01-15 NOTE — PATIENT INSTRUCTIONS
breo  Working well as controller    Use minimal albuterol or xopenex- only if needed    Use flonase to control sinuses- sinus may worsen lungs,     Stop singulair caused dopey.    Doubt lungs are 35% - repeat breathing test.

## 2019-01-15 NOTE — PROGRESS NOTES
"1/15/2019    Ayla Dela Cruz  New Patient Consult    Chief Complaint   Patient presents with    Follow-up     3 month    Asthma    Shortness of Breath       HPI:    Mark 15, 2019- breo  With  No rescue use, uses flonase, had syncope with  Ankle  Fx.          Oct 16, 2018 pt having sob.  Pt had asthma as child and outgrew.  Pt has had agarwal 2 yrs, no seasonal variation, no clear nocturnal worsening.  Pt has been on intermittent amiodarone with eval /rx Dr Spaulding.  Pt had a fib resolved.  pft in 2014 with vc 35%.  Pt had 3 ablations. On chr xarelto.  No h/o pe.  No edema.  On aldactone.  Pt had transient right paresis - resolved - tia/stroke.    Pt in er 10/14- no wheezes, place empirically on prednisone 40/d with good response.  With albuterol use once breathing felt nl - 1st within last 1-2 yrs.    Patient Instructions   Chr sinus drip - may use flonase 1-2  Each side - daily best.    Use trial singulair - should help - usually helps sinus and lung    Use breo daily - 24 hr inhaled steroid  And 24 rescue medication.    May use albuterol 1 every 4 as needed.  May use prednisone if not great control and effective response to prednisone.    Re check lung capacity once clear.    May use prednisone - as low as able- 5 - 10- 20 daily for 3 and repeat if breathing bad, note response.        The chief compliant  problem is new to me",    PFSH:  Past Medical History:   Diagnosis Date    Anticoagulant long-term use     Anxiety     Arthritis     Atrial fibrillation     Cancer     skin    CHF (congestive heart failure)     Depression     DVT (deep venous thrombosis)     GERD (gastroesophageal reflux disease)     Glaucoma (increased eye pressure)     Hyperlipidemia     diet controlled    Hypertension     Interstitial lung disease     Mild persistent asthma without complication 11/12/2018    Pneumonia 1/31/2014    Stroke 6-3-14    Stroke     TIA (transient ischemic attack)     TIA (transient ischemic " attack)          Past Surgical History:   Procedure Laterality Date    2 heart ablations      3 in total    ABLATION N/A 9/7/2017    Performed by [facility] at [facility] CATH LAB    bilateral cataracts      BLOCK, NERVE, FACET JOINT, MEDIAL BRANCH, CERVICAL Right 6/7/2018    [facility]    BLOCK-NERVE-MEDIAL BRANCH-LUMBAR Bilateral 3/6/2018    [facility]    BRONCHOSCOPY N/A 5/1/2014    Performed by Jose Eduardo White MD at [facility] ENDO    CHOLECYSTECTOMY      CHOLECYSTECTOMY- open N/A 8/31/2016    Performed by Lavon Gonsalez MD at [facility] OR    COLONOSCOPY and EGD N/A 1/19/2017    Performed by Jonathan Schultz MD at [facility] ENDO    ESOPHAGOGASTRODUODENOSCOPY (EGD) N/A 1/19/2017    Performed by Jonathan Schultz MD at [facility] ENDO    INJECTION-STEROID-EPIDURAL-CERVICAL N/A 4/10/2018    [facility]    INJECTION-STEROID-EPIDURAL-CERVICAL N/A 1/29/2018    [facility]    ORIF, ANKLE Right 11/1/2018    Performed by Wilfredo Aguero MD at [facility] OR    pyloristenosis      RADIOFREQUENCY ABLATION, NERVE, SPINAL, LUMBAR, MEDIAL BRANCH, 1 LEVEL Bilateral 9/21/2018    [facility]    RADIOFREQUENCY THERMAL COAGULATION, NERVE, SPINAL, CERVICAL, MEDIAL BRANCH OF POSTERIOR RAMUS Right 7/3/2018    [facility]    RADIOFREQUENCY THERMOCOAGULATION (RFTC)-NERVE-MEDIAN BRANCH-LUMBAR Bilateral 3/12/2018    [facility]    skin cancer removal       TONSILLECTOMY      TRANSESOPHAGEAL ECHOCARDIOGRAM (FELICIA) N/A 9/7/2017    Performed by F[facility] Calderon MD at [facility] CATH LAB     Social History     Tobacco Use    Smoking status: Never Smoker    Smokeless tobacco: Never Used   Substance Use Topics    Alcohol use: No    Drug use: No     Family History   Problem Relation Age of Onset    Heart disease Father     Ulcers Father     Arthritis Mother      "Asthma Mother     Rheum arthritis Mother     Pneumonia Mother     Depression Son     Alzheimer's disease Maternal Uncle     Rheum arthritis Maternal Grandmother     Emphysema Maternal Grandfather     Cancer Maternal Grandfather         kidney    Kidney disease Maternal Grandfather     Cancer Paternal Grandmother         lung= smoker    Pneumonia Paternal Grandfather     Breast cancer Neg Hx     Ovarian cancer Neg Hx      Review of patient's allergies indicates:   Allergen Reactions    Atorvastatin      Other reaction(s): Joint pain    Augmentin [amoxicillin-pot clavulanate]      Other reaction(s): Mental Status Change    Baclofen (bulk) Nausea And Vomiting    Ciprofloxacin (bulk)     Decongest tabs      Other reaction(s): increased heart rate    Erythromycin Other (See Comments)    Flecainide Hives     And SOB. No reaction to Lidocaine     Fluoxetine      Other reaction(s): heart palpitations  Other reaction(s): anxiety    Lisinopril Other (See Comments)     cough    Losartan Other (See Comments)     Hypotension with lightheadedness    Morphine Other (See Comments)     confusion    Tramadol Other (See Comments)     SOB, low BP    Venlafaxine analogues      Changes in BP and increases heart rate       Afrin [oxymetazoline] Palpitations    Caffeine Palpitations    Tizanidine Anxiety     dizziness       Performance Status:The patient's activity level is functions out of house.      Review of Systems:  a review of eleven systems covering constitutional, Eye, HEENT, Psych, Respiratory, Cardiac, GI, , Musculoskeletal, Endocrine, Dermatologic was negative except for pertinent findings as listed ABOVE and below: chr sinus     Exam:Comprehensive exam done. /68 (BP Location: Left arm, Patient Position: Sitting)   Pulse 72   Ht 5' 7" (1.702 m)   Wt 59 kg (130 lb)   SpO2 98% Comment: on room air  BMI 20.36 kg/m²   Exam included Vitals as listed, and patient's appearance and affect and " alertness and mood, oral exam for yeast and hygiene and pharynx lesions and Mallapatti (M) score, neck with inspection for jvd and masses and thyroid abnormalities and lymph nodes (supraclavicular and infraclavicular nodes and axillary also examined and noted if abn), chest exam included symmetry and effort and fremitus and percussion and auscultation, cardiac exam included rhythm and gallops and murmur and rubs and jvd and edema, abdominal exam for mass and hepatosplenomegaly and tenderness and hernias and bowel sounds, Musculoskeletal exam with muscle tone and posture and mobility/gait and  strength, and skin for rashes and cyanosis and pallor and turgor, extremity for clubbing.  Findings were normal except for pertinent findings listed below:  M2, chest is symmetric, no distress, normal percussion, normal fremitus and good normal breath sounds      Radiographs (ct chest and cxr) reviewed: view by direct vision  Geographic gg changes 8/2017,     Labs reviewed           PFT results reviewed  vc 35%      Plan:  Clinical impression is apparently straight forward and impression with management as below.    There are no diagnoses linked to this encounter.    No Follow-up on file.    Discussed with patient above for education the following:      There are no Patient Instructions on file for this visit.

## 2019-01-21 ENCOUNTER — HOSPITAL ENCOUNTER (OUTPATIENT)
Dept: RADIOLOGY | Facility: HOSPITAL | Age: 74
Discharge: HOME | End: 2019-01-21
Attending: OBSTETRICS & GYNECOLOGY
Payer: MEDICARE

## 2019-01-21 DIAGNOSIS — R92.8 ABNORMAL MAMMOGRAM OF LEFT BREAST: ICD-10-CM

## 2019-01-21 PROCEDURE — 77062 BREAST TOMOSYNTHESIS BI: CPT | Mod: TC,PO

## 2019-01-21 PROCEDURE — 77062 BREAST TOMOSYNTHESIS BI: CPT | Mod: 26,,, | Performed by: RADIOLOGY

## 2019-01-21 PROCEDURE — 77066 MAMMO DIGITAL DIAGNOSTIC BILAT WITH TOMOSYNTHESIS_CAD: ICD-10-PCS | Mod: 26,,, | Performed by: RADIOLOGY

## 2019-01-21 PROCEDURE — 77062 MAMMO DIGITAL DIAGNOSTIC BILAT WITH TOMOSYNTHESIS_CAD: ICD-10-PCS | Mod: 26,,, | Performed by: RADIOLOGY

## 2019-01-21 PROCEDURE — 77066 DX MAMMO INCL CAD BI: CPT | Mod: 26,,, | Performed by: RADIOLOGY

## 2019-01-24 DIAGNOSIS — M25.571 RIGHT ANKLE PAIN, UNSPECIFIED CHRONICITY: Primary | ICD-10-CM

## 2019-01-25 ENCOUNTER — OFFICE VISIT (OUTPATIENT)
Dept: ORTHOPEDICS | Facility: CLINIC | Age: 74
End: 2019-01-25
Payer: MEDICARE

## 2019-01-25 ENCOUNTER — DOCUMENTATION ONLY (OUTPATIENT)
Dept: FAMILY MEDICINE | Facility: CLINIC | Age: 74
End: 2019-01-25

## 2019-01-25 ENCOUNTER — HOSPITAL ENCOUNTER (OUTPATIENT)
Dept: RADIOLOGY | Facility: HOSPITAL | Age: 74
Discharge: HOME | End: 2019-01-25
Attending: ORTHOPAEDIC SURGERY
Payer: MEDICARE

## 2019-01-25 VITALS
HEART RATE: 69 BPM | DIASTOLIC BLOOD PRESSURE: 66 MMHG | HEIGHT: 67 IN | SYSTOLIC BLOOD PRESSURE: 111 MMHG | BODY MASS INDEX: 20.4 KG/M2 | WEIGHT: 130 LBS

## 2019-01-25 DIAGNOSIS — S82.61XD CLOSED DISPLACED FRACTURE OF LATERAL MALLEOLUS OF RIGHT FIBULA WITH ROUTINE HEALING, SUBSEQUENT ENCOUNTER: Primary | ICD-10-CM

## 2019-01-25 DIAGNOSIS — M25.571 RIGHT ANKLE PAIN, UNSPECIFIED CHRONICITY: ICD-10-CM

## 2019-01-25 PROCEDURE — 99024 POSTOP FOLLOW-UP VISIT: CPT | Mod: POP,,, | Performed by: ORTHOPAEDIC SURGERY

## 2019-01-25 PROCEDURE — 73610 XR ANKLE COMPLETE 3 VIEW RIGHT: ICD-10-PCS | Mod: 26,RT,, | Performed by: RADIOLOGY

## 2019-01-25 PROCEDURE — 99024 PR POST-OP FOLLOW-UP VISIT: ICD-10-PCS | Mod: POP,,, | Performed by: ORTHOPAEDIC SURGERY

## 2019-01-25 PROCEDURE — 73610 X-RAY EXAM OF ANKLE: CPT | Mod: 26,RT,, | Performed by: RADIOLOGY

## 2019-01-25 PROCEDURE — 99213 OFFICE O/P EST LOW 20 MIN: CPT | Mod: PBBFAC,25,PN | Performed by: ORTHOPAEDIC SURGERY

## 2019-01-25 PROCEDURE — 99999 PR PBB SHADOW E&M-EST. PATIENT-LVL III: CPT | Mod: PBBFAC,,, | Performed by: ORTHOPAEDIC SURGERY

## 2019-01-25 PROCEDURE — 99999 PR PBB SHADOW E&M-EST. PATIENT-LVL III: ICD-10-PCS | Mod: PBBFAC,,, | Performed by: ORTHOPAEDIC SURGERY

## 2019-01-25 PROCEDURE — 73610 X-RAY EXAM OF ANKLE: CPT | Mod: TC,PN,RT

## 2019-01-25 NOTE — PROGRESS NOTES
Past Medical History:   Diagnosis Date    Anticoagulant long-term use     Anxiety     Arthritis     Atrial fibrillation     Cancer     skin    CHF (congestive heart failure)     Depression     DVT (deep venous thrombosis)     GERD (gastroesophageal reflux disease)     Glaucoma (increased eye pressure)     Hyperlipidemia     diet controlled    Hypertension     Interstitial lung disease     Mild persistent asthma without complication 11/12/2018    Pneumonia 1/31/2014    Stroke 6-3-14    Stroke     TIA (transient ischemic attack)     TIA (transient ischemic attack)        Past Surgical History:   Procedure Laterality Date    2 heart ablations      3 in total    ABLATION N/A 9/7/2017    Performed by Fco Calderon MD at [facility] CATH LAB    bilateral cataracts      BLOCK, NERVE, FACET JOINT, MEDIAL BRANCH, CERVICAL Right 6/7/2018    Performed by Galo Clancy MD at [facility] OR    BLOCK-NERVE-MEDIAL BRANCH-LUMBAR Bilateral 3/6/2018    Performed by Galo Clancy MD at [facility] OR    BRONCHOSCOPY N/A 5/1/2014    Performed by Jose Eduardo White MD at [facility] ENDO    CHOLECYSTECTOMY      CHOLECYSTECTOMY- open N/A 8/31/2016    Performed by Lavon Gonsalez MD at [facility] OR    COLONOSCOPY and EGD N/A 1/19/2017    Performed by Jonathan Schultz MD at [facility] ENDO    ESOPHAGOGASTRODUODENOSCOPY (EGD) N/A 1/19/2017    Performed by Jonathan Schultz MD at [facility] ENDO    INJECTION-STEROID-EPIDURAL-CERVICAL N/A 4/10/2018    Performed by Galo Clancy MD at [facility] OR    INJECTION-STEROID-EPIDURAL-CERVICAL N/A 1/29/2018    Performed by Galo Clancy MD at [facility] OR    ORIF, ANKLE Right 11/1/2018    Performed by Wilfredo Aguero MD at [facility] OR    pyloristenosis      RADIOFREQUENCY ABLATION, NERVE, SPINAL, LUMBAR, MEDIAL BRANCH, 1 LEVEL Bilateral 9/21/2018    Performed by Galo Clancy MD at [facility] OR    RADIOFREQUENCY THERMAL COAGULATION, NERVE, SPINAL, CERVICAL, MEDIAL BRANCH OF POSTERIOR RAMUS Right 7/3/2018    Performed by Galo Clancy MD at  [facility] OR    RADIOFREQUENCY THERMOCOAGULATION (RFTC)-NERVE-MEDIAN BRANCH-LUMBAR Bilateral 3/12/2018    Performed by Galo Clancy MD at [facility] OR    skin cancer removal       TONSILLECTOMY      TRANSESOPHAGEAL ECHOCARDIOGRAM (FELICIA) N/A 9/7/2017    Performed by [facility] at [facility] CATH LAB       Current Outpatient Medications   Medication Sig    ammonium lactate (LAC-HYDRIN) 12 % lotion     ATROPINE SULFATE, PF, OPHT Apply to eye.    dorzolamide-timolol (COSOPT) 2-0.5 % ophthalmic solution Place 1 drop into both eyes 2 (two) times daily. Twice a day    fluticasone (FLONASE) 50 mcg/actuation nasal spray 1 spray (50 mcg total) by Each Nare route once daily.    fluticasone-vilanterol (BREO ELLIPTA) 200-25 mcg/dose DsDv diskus inhaler Inhale 1 puff into the lungs once daily.    FLUZONE HIGH-DOSE 2018-19, PF, 180 mcg/0.5 mL vaccine TO BE ADMINISTERED BY PHARMACIST FOR IMMUNIZATION    gabapentin (NEURONTIN) 300 MG capsule TAKE ONE CAPSULE BY MOUTH ONCE DAILY IN THE EVENING    levalbuterol (XOPENEX HFA) 45 mcg/actuation inhaler Inhale 1-2 puffs into the lungs every 4 (four) hours as needed for Wheezing. This is a rescue inhaler medication and should be used as needed    lorazepam (ATIVAN) 1 MG tablet TAKE 1 TABLET BY MOUTH DAILY (Patient taking differently: TAKE 1 TABLET BY MOUTH DAILY PRN)    metoprolol tartrate (LOPRESSOR) 50 MG tablet TAKE 1 TABLET (50 MG TOTAL) BY MOUTH 2 (TWO) TIMES DAILY.    metroNIDAZOLE 0.75 % Lotn APPLY A PEA SIZED AMOUNT TO FACE DAILY    montelukast (SINGULAIR) 10 mg tablet Take 1 tablet (10 mg total) by mouth every evening.    nystatin (MYCOSTATIN) 100,000 unit/mL suspension Take 6 mLs (600,000 Units total) by mouth 4 (four) times daily.    predniSONE (DELTASONE) 5 MG tablet Take 2 tablets (10 mg total) by mouth 2 (two) times daily. may repeat for shortness of breath.    rosuvastatin (CRESTOR) 40 MG Tab TAKE 1 TABLET (40 MG TOTAL) BY MOUTH EVERY EVENING. (Patient taking  differently: Take 20 mg by mouth every evening. )    spironolactone (ALDACTONE) 25 MG tablet Take 1 tablet (25 mg total) by mouth once daily.    VIIBRYD 40 mg Tab tablet Take 40 mg by mouth once daily.    XARELTO 20 mg Tab TAKE 1 TABLET (20 MG TOTAL) BY MOUTH DAILY WITH DINNER OR EVENING MEAL.    pantoprazole (PROTONIX) 40 MG tablet Take 1 tablet (40 mg total) by mouth once daily. (Patient taking differently: Take 40 mg by mouth once daily. )     No current facility-administered medications for this visit.        Review of patient's allergies indicates:   Allergen Reactions    Atorvastatin      Other reaction(s): Joint pain    Augmentin [amoxicillin-pot clavulanate]      Other reaction(s): Mental Status Change    Baclofen (bulk) Nausea And Vomiting    Ciprofloxacin (bulk)     Decongest tabs      Other reaction(s): increased heart rate    Erythromycin Other (See Comments)    Flecainide Hives     And SOB. No reaction to Lidocaine     Fluoxetine      Other reaction(s): heart palpitations  Other reaction(s): anxiety    Lisinopril Other (See Comments)     cough    Losartan Other (See Comments)     Hypotension with lightheadedness    Morphine Other (See Comments)     confusion    Tramadol Other (See Comments)     SOB, low BP    Venlafaxine analogues      Changes in BP and increases heart rate       Afrin [oxymetazoline] Palpitations    Caffeine Palpitations    Tizanidine Anxiety     dizziness       Family History   Problem Relation Age of Onset    Heart disease Father     Ulcers Father     Arthritis Mother     Asthma Mother     Rheum arthritis Mother     Pneumonia Mother     Depression Son     Alzheimer's disease Maternal Uncle     Rheum arthritis Maternal Grandmother     Emphysema Maternal Grandfather     Cancer Maternal Grandfather         kidney    Kidney disease Maternal Grandfather     Cancer Paternal Grandmother         lung= smoker    Pneumonia Paternal Grandfather     Breast  cancer Neg Hx     Ovarian cancer Neg Hx        Social History     Socioeconomic History    Marital status:      Spouse name: Not on file    Number of children: Not on file    Years of education: Not on file    Highest education level: Not on file   Social Needs    Financial resource strain: Not on file    Food insecurity - worry: Not on file    Food insecurity - inability: Not on file    Transportation needs - medical: Not on file    Transportation needs - non-medical: Not on file   Occupational History    Not on file   Tobacco Use    Smoking status: Never Smoker    Smokeless tobacco: Never Used   Substance and Sexual Activity    Alcohol use: No    Drug use: No    Sexual activity: Yes     Partners: Male   Other Topics Concern    Not on file   Social History Narrative    Not on file       Chief Complaint:   Chief Complaint   Patient presents with    Ankle Pain     6 week followup ORIF right ankle 11/1/18       Consulting Physician: No ref. provider found    History of present illness: This is a 73 y.o. female following up for right ankle lateral malleolus ORIF 11-1-18.      Review of Systems:    Constitution: Denies chills, fever, and sweats.    HENT: Denies headaches or blurry vision.    Cardiovascular: Denies chest pain or irregular heart beat.    Respiratory: Denies cough or shortness of breath.    Gastrointestinal: Denies abdominal pain, nausea, or vomiting.    Musculoskeletal:  Denies muscle cramps.    Neurological: Denies dizziness or focal weakness.    Psychiatric/Behavioral: Normal mental status.    Hematologic/Lymphatic: Denies bleeding problem or easy bruising/bleeding.    Skin: Denies rash or suspicious lesions.      Examination:    Vital Signs:    Vitals:    01/25/19 1024   BP: 111/66   Pulse: 69       Body mass index is 20.36 kg/m².    This a well-developed, well nourished patient in no acute distress.    Alert and oriented and cooperative to examination.       Physical Exam:  right ankle    Inspection/Observation   Swelling:   none  Erythema:   none  Bruising:   none  Wounds:   healed  Drainage:  None    Range of Motion   Near normal    Muscle Strength   4+-5/5    Other   Sensation:  normal  Pulse:    palpable    Imaging: Normal post-operative XR with healing     Assessment: Closed displaced fracture of lateral malleolus of right fibula with routine healing, subsequent encounter        Plan: Doing well. DC boot. WBAT. Back in 6 weeks. Continue PT.      DISCLAIMER: This note may have been dictated using voice recognition software and may contain grammatical errors. Report sent to referring provider as required.

## 2019-01-25 NOTE — PROGRESS NOTES
Pre-Visit Chart Review  For Appointment Scheduled on 01/28/19    Health Maintenance Due   Topic Date Due    TETANUS VACCINE  03/12/1963    Zoster Vaccine  03/12/2003

## 2019-01-28 ENCOUNTER — OFFICE VISIT (OUTPATIENT)
Dept: FAMILY MEDICINE | Facility: CLINIC | Age: 74
End: 2019-01-28
Payer: MEDICARE

## 2019-01-28 VITALS
TEMPERATURE: 98 F | BODY MASS INDEX: 20.97 KG/M2 | WEIGHT: 133.63 LBS | HEART RATE: 64 BPM | HEIGHT: 67 IN | DIASTOLIC BLOOD PRESSURE: 77 MMHG | SYSTOLIC BLOOD PRESSURE: 129 MMHG

## 2019-01-28 DIAGNOSIS — M85.80 OSTEOPENIA, UNSPECIFIED LOCATION: ICD-10-CM

## 2019-01-28 DIAGNOSIS — Z78.0 ASYMPTOMATIC MENOPAUSE: ICD-10-CM

## 2019-01-28 DIAGNOSIS — Z86.39 HISTORY OF VITAMIN D DEFICIENCY: ICD-10-CM

## 2019-01-28 DIAGNOSIS — I10 HYPERTENSION, UNSPECIFIED TYPE: Primary | ICD-10-CM

## 2019-01-28 DIAGNOSIS — I50.32 CHRONIC DIASTOLIC HEART FAILURE: ICD-10-CM

## 2019-01-28 DIAGNOSIS — M47.896 OTHER SPONDYLOSIS, LUMBAR REGION: ICD-10-CM

## 2019-01-28 DIAGNOSIS — Z86.73 H/O: CVA (CEREBROVASCULAR ACCIDENT): ICD-10-CM

## 2019-01-28 DIAGNOSIS — I48.91 ATRIAL FIBRILLATION, UNSPECIFIED TYPE: ICD-10-CM

## 2019-01-28 DIAGNOSIS — E78.5 HYPERLIPIDEMIA, UNSPECIFIED HYPERLIPIDEMIA TYPE: ICD-10-CM

## 2019-01-28 PROBLEM — M47.892 OTHER SPONDYLOSIS, CERVICAL REGION: Status: RESOLVED | Noted: 2018-07-03 | Resolved: 2019-01-28

## 2019-01-28 PROBLEM — T43.615A CAFFEINE ADVERSE REACTION: Status: RESOLVED | Noted: 2017-11-15 | Resolved: 2019-01-28

## 2019-01-28 PROBLEM — Z78.9 MEDICATION INTOLERANCE: Status: RESOLVED | Noted: 2018-09-20 | Resolved: 2019-01-28

## 2019-01-28 PROBLEM — F13.939 BENZODIAZEPINE WITHDRAWAL WITH COMPLICATION: Status: RESOLVED | Noted: 2017-11-15 | Resolved: 2019-01-28

## 2019-01-28 PROCEDURE — 99999 PR PBB SHADOW E&M-EST. PATIENT-LVL V: ICD-10-PCS | Mod: PBBFAC,,, | Performed by: PHYSICIAN ASSISTANT

## 2019-01-28 PROCEDURE — 99214 PR OFFICE/OUTPT VISIT, EST, LEVL IV, 30-39 MIN: ICD-10-PCS | Mod: S$PBB,,, | Performed by: PHYSICIAN ASSISTANT

## 2019-01-28 PROCEDURE — 99999 PR PBB SHADOW E&M-EST. PATIENT-LVL V: CPT | Mod: PBBFAC,,, | Performed by: PHYSICIAN ASSISTANT

## 2019-01-28 PROCEDURE — 99215 OFFICE O/P EST HI 40 MIN: CPT | Mod: PBBFAC,PO | Performed by: PHYSICIAN ASSISTANT

## 2019-01-28 PROCEDURE — 99214 OFFICE O/P EST MOD 30 MIN: CPT | Mod: S$PBB,,, | Performed by: PHYSICIAN ASSISTANT

## 2019-01-28 NOTE — PROGRESS NOTES
Subjective:       Patient ID: Ayla Dela Cruz is a 73 y.o. female.    Chief Complaint: Follow-up (Annual)    HPI   Patient is a 73-year-old  female with paroxysmal AFib, hypertension, hyperlipidemia, cervical spondylosis with radiculopathy, lumbar radiculopathy, osteoarthritis, interstitial lung disease presenting to the clinic for annual physical exam.   1.  Hypertension- well controlled on metoprolol and spironolactone.  Patient previously on losartan and this was discontinued after several hypotensive incidents.  2.  Hyperlipidemia-stable on Crestor 40 mg daily.  3.  Osteopenia-last bone density scan in January 2017 showed borderline osteopenia.  She has since had a femur fracture.  We will re-evaluate with new bone density scan.  4.  Proximal AFib, a flutter-patient currently in normal sinus rhythm paris taking metoprolol 50 mg b.i.d..  Under care of electrophysiology physician Dr. Caldreon and cardiologist Dr. Jurado.  5.  Cervical radiculopathy and lumbar radiculopathy- establish with pain management Dr. Clancy.  Taking gabapentin nightly as directed.  6.  Screening breast cancer- last mammogram 01/21/2019 no evidence of malignancy seen.  There was a simple cyst seen in the right breast thought to be benign.  Recommendation is to repeat mammogram in 1 year.   Review of Systems   Constitutional: Positive for activity change. Negative for unexpected weight change.   HENT: Negative for hearing loss, rhinorrhea and trouble swallowing.    Eyes: Negative for discharge and visual disturbance.   Respiratory: Negative for chest tightness and wheezing.    Cardiovascular: Positive for palpitations. Negative for chest pain.   Gastrointestinal: Negative for blood in stool, constipation, diarrhea and vomiting.   Endocrine: Negative for polydipsia and polyuria.   Genitourinary: Negative for difficulty urinating, dysuria, hematuria and menstrual problem.   Musculoskeletal: Positive for arthralgias. Negative for joint  swelling and neck pain.   Neurological: Negative for weakness and headaches.   Psychiatric/Behavioral: Negative for confusion and dysphoric mood.       Objective:      Physical Exam   Constitutional: Vital signs are normal. She appears well-developed and well-nourished. No distress.   Cardiovascular: Normal rate, regular rhythm, S1 normal, S2 normal and normal heart sounds. Exam reveals no gallop.   No murmur heard.  Pulses:       Radial pulses are 2+ on the right side, and 2+ on the left side.   <2sec cap refill fingers bilat     Pulmonary/Chest: Effort normal and breath sounds normal. No respiratory distress. She has no wheezes. She has no rhonchi.   Skin: Skin is warm and dry. She is not diaphoretic.   Psychiatric: She has a normal mood and affect. Her speech is normal and behavior is normal. Judgment and thought content normal. Cognition and memory are normal.   Nursing note reviewed.      Assessment:       1. Hypertension, unspecified type    2. Atrial fibrillation, unspecified type    3. Chronic diastolic heart failure, 2014    4. Hyperlipidemia, unspecified hyperlipidemia type    5. Osteopenia, unspecified location    6. History of vitamin D deficiency    7. Asymptomatic menopause    8. Other spondylosis, lumbar region    9. H/O: CVA (cerebrovascular accident), June 2014        Plan:       Ayla was seen today for follow-up.    Diagnoses and all orders for this visit:    Hypertension, unspecified type  Well controlled on current medications.  -     CBC auto differential; Future  -     Comprehensive metabolic panel; Future  -     TSH; Future    Atrial fibrillation, unspecified type  Normal sinus rhythm today.  Continue follow-up with electrophysiology as directed.  -     TSH; Future    Chronic diastolic heart failure, 2014  Stable.  Continue follow-up with Cardiology.    Hyperlipidemia, unspecified hyperlipidemia type  Well controlled on Crestor 40 mg.  -     Lipid panel; Future    Osteopenia, unspecified  location  -     DXA Bone Density Spine And Hip; Future  -     Vitamin D; Future    History of vitamin D deficiency  -     Vitamin D; Future    Asymptomatic menopause  -     DXA Bone Density Spine And Hip; Future    Other spondylosis, lumbar region  Stable.  Continue follow-up with pain management as directed.    H/O: CVA (cerebrovascular accident), June 2014  Patient's anticoagulated on Xarelto.  Blood pressure is well controlled.  She is on Crestor 40 mg daily.      Patient readiness: acceptance and barriers:none    During the course of the visit the patient was educated and counseled about the following:     Hypertension:   Medication: no change.    Goals: Hypertension: Reduce Blood Pressure    Did patient meet goals/outcomes: No    The following self management tools provided: declined    Patient Instructions (the written plan) was given to the patient/family.     Time spent with patient: 30 minutes    Barriers to medications present (no )    Adverse reactions to current medications (no)    Over the counter medications reviewed (Yes)

## 2019-01-29 ENCOUNTER — HOSPITAL ENCOUNTER (OUTPATIENT)
Dept: RADIOLOGY | Facility: CLINIC | Age: 74
Discharge: HOME | End: 2019-01-29
Attending: PHYSICIAN ASSISTANT
Payer: MEDICARE

## 2019-01-29 DIAGNOSIS — M85.80 OSTEOPENIA, UNSPECIFIED LOCATION: ICD-10-CM

## 2019-01-29 DIAGNOSIS — Z78.0 ASYMPTOMATIC MENOPAUSE: ICD-10-CM

## 2019-01-29 PROCEDURE — 77080 DXA BONE DENSITY AXIAL: CPT | Mod: TC,PO

## 2019-01-29 PROCEDURE — 77080 DEXA BONE DENSITY SPINE HIP: ICD-10-PCS | Mod: 26,,, | Performed by: RADIOLOGY

## 2019-01-29 PROCEDURE — 77080 DXA BONE DENSITY AXIAL: CPT | Mod: 26,,, | Performed by: RADIOLOGY

## 2019-01-29 RX ORDER — IBANDRONATE SODIUM 150 MG/1
150 TABLET, FILM COATED ORAL
Qty: 3 TABLET | Refills: 4 | Status: ON HOLD | OUTPATIENT
Start: 2019-01-29 | End: 2019-09-10

## 2019-02-06 ENCOUNTER — TELEPHONE (OUTPATIENT)
Dept: PAIN MEDICINE | Facility: CLINIC | Age: 74
End: 2019-02-06

## 2019-02-06 ENCOUNTER — TELEPHONE (OUTPATIENT)
Dept: CARDIOLOGY | Facility: CLINIC | Age: 74
End: 2019-02-06

## 2019-02-06 ENCOUNTER — OFFICE VISIT (OUTPATIENT)
Dept: PAIN MEDICINE | Facility: CLINIC | Age: 74
End: 2019-02-06
Payer: MEDICARE

## 2019-02-06 VITALS
SYSTOLIC BLOOD PRESSURE: 148 MMHG | DIASTOLIC BLOOD PRESSURE: 80 MMHG | BODY MASS INDEX: 20.88 KG/M2 | HEIGHT: 67 IN | HEART RATE: 60 BPM | WEIGHT: 133 LBS

## 2019-02-06 DIAGNOSIS — M79.10 MYALGIA: ICD-10-CM

## 2019-02-06 DIAGNOSIS — M47.896 OTHER SPONDYLOSIS, LUMBAR REGION: Primary | ICD-10-CM

## 2019-02-06 DIAGNOSIS — D64.9 ANEMIA, UNSPECIFIED TYPE: Primary | ICD-10-CM

## 2019-02-06 DIAGNOSIS — M47.892 OTHER SPONDYLOSIS, CERVICAL REGION: ICD-10-CM

## 2019-02-06 PROCEDURE — 99214 OFFICE O/P EST MOD 30 MIN: CPT | Mod: PBBFAC,PN | Performed by: ANESTHESIOLOGY

## 2019-02-06 PROCEDURE — 99999 PR PBB SHADOW E&M-EST. PATIENT-LVL IV: ICD-10-PCS | Mod: PBBFAC,,, | Performed by: ANESTHESIOLOGY

## 2019-02-06 PROCEDURE — 99214 PR OFFICE/OUTPT VISIT, EST, LEVL IV, 30-39 MIN: ICD-10-PCS | Mod: S$PBB,,, | Performed by: ANESTHESIOLOGY

## 2019-02-06 PROCEDURE — 99214 OFFICE O/P EST MOD 30 MIN: CPT | Mod: S$PBB,,, | Performed by: ANESTHESIOLOGY

## 2019-02-06 PROCEDURE — 99999 PR PBB SHADOW E&M-EST. PATIENT-LVL IV: CPT | Mod: PBBFAC,,, | Performed by: ANESTHESIOLOGY

## 2019-02-06 NOTE — PROGRESS NOTES
Referring Physician: No ref. provider found    PCP: Jan Olivo MD    CC:  Lower back pain    Interval History:  Mrs. Dela Cruz is a 73 y.o. female who is known patient to our clinic.  She states recurrence of her lower back pain.  Pain is a constant aching, burning pain in her lower back.  Pain radiates to her bilateral hips.  She had moderate benefit from lumbar MB RFA procedure performed in September 2018.  Pain has slowly recurred.  She desires repeat procedure. Neck pain continues to be tolerable following cervical CHRISTOPHER as well as cervical MB RFA procedures.  She denies any worsening weakness.  No bowel bladder changes.   Prior HPI:   Ayla Dela Cruz is a 73 y.o. female who presents as a new patient from Dr. Grubbs for 2.5 month history of R shoulder/mid back pain. The pain first began when she lifted some books at a bookstore in early November. She began feeling the pain in her R lateral neck/suprascapular region, with radiation down the back/side of her arm to the top of the elbow. She was treated with some topical creams, chiropractor adjustments, and then was placed on gabapentin by Dr. Grubbs, which has helped her pain a considerable amount. She states that the pain is intermittent, aching, sharp, shooting & radiates to her elbow. Worsened by sitting, or lying in bed. 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     Interventional history:  Cervical epidural steroid injection on 01/2018 and 04/2018 with moderate benefit of her neck and right shoulder pain.  - Cervical right C 4-6 on 07/2018 with 60% relief of her right-sided neck pain.  - lumbar MB RFA on 09/2018 with 60% relief of her lower back pain    ROS:  CONSTITUTIONAL: No fevers, chills, night sweats, wt. loss, appetite changes  SKIN: no rashes or itching  ENT: No headaches, head trauma, vision changes, or eye pain  LYMPH NODES: None noticed   CV: No chest pain, palpitations.   RESP: No shortness of breath, dyspnea on exertion, cough,  wheezing, or hemoptysis  GI: No nausea, emesis, diarrhea, constipation, melena, hematochezia, pain.    : No dysuria, hematuria, urgency, or frequency   HEME: No easy bruising, bleeding problems  PSYCHIATRIC: No anxiety, psychosis, hallucinations. +ve depression  NEURO: No seizures, memory loss, dizziness, +ve difficulty sleeping  MSK: +HPI      Past Medical History:   Diagnosis Date    Anticoagulant long-term use     Anxiety     Arthritis     Atrial fibrillation     Cancer     skin    CHF (congestive heart failure)     Depression     DVT (deep venous thrombosis)     GERD (gastroesophageal reflux disease)     Glaucoma (increased eye pressure)     Hyperlipidemia     diet controlled    Hypertension     Interstitial lung disease     Mild persistent asthma without complication 11/12/2018    Pneumonia 1/31/2014    Stroke 6-3-14    Stroke     TIA (transient ischemic attack)     TIA (transient ischemic attack)      Past Surgical History:   Procedure Laterality Date    2 heart ablations      3 in total    ABLATION N/A 9/7/2017    Performed by [facility] Calderon [facility] at [facility] CATH LAB    bilateral cataracts      BLOCK, NERVE, FACET JOINT, MEDIAL BRANCH, CERVICAL Right 6/7/2018    [facility]    BLOCK-NERVE-MEDIAL BRANCH-LUMBAR Bilateral 3/6/2018    [facility] at [facility]    BRONCHOSCOPY N/A 5/1/2014    Performed by [facility]ardo [facility] at [facility] ENDO    CHOLECYSTECTOMY      CHOLECYSTECTOMY- open N/A 8/31/2016    Performed by Lavon Gonsalez [facility] at [facility] OR    COLONOSCOPY and EGD N/A 1/19/2017    Performed by [facility] Schultz [facility] at [facility] ENDO    ESOPHAGOGASTRODUODENOSCOPY (EGD) N/A 1/19/2017    Performed by [facility]ultz [facility] at [facility] ENDO    INJECTION-STEROID-EPIDURAL-CERVICAL N/A 4/10/2018    [facility] at [facility]    INJECTION-STEROID-EPIDURAL-CERVICAL N/A 1/29/2018    [facility] by [facility] at [facility]    ORIF, ANKLE Right 11/1/2018     [facility]    pyloristenosis      RADIOFREQUENCY ABLATION, NERVE, SPINAL, LUMBAR, MEDIAL BRANCH, 1 LEVEL Bilateral 9/21/2018    [facility] by [facility] at [facility] OR    RADIOFREQUENCY THERMAL COAGULATION, NERVE, SPINAL, CERVICAL, MEDIAL BRANCH OF POSTERIOR RAMUS Right 7/3/2018    Performed by [facility] at [facility] OR    RADIOFREQUENCY THERMOCOAGULATION (RFTC)-NERVE-MEDIAN BRANCH-LUMBAR Bilateral 3/12/2018    [facility] at [facility] OR    skin cancer removal       TONSILLECTOMY      TRANSESOPHAGEAL ECHOCARDIOGRAM (FELICIA) N/A 9/7/2017    Performed by [facility] at [facility] CATH LAB     Family History   Problem Relation Age of Onset    Heart disease Father     Ulcers Father     Arthritis Mother     Asthma Mother     Rheum arthritis Mother     Pneumonia Mother     Depression Son     Alzheimer's disease Maternal Uncle     Rheum arthritis Maternal Grandmother     Emphysema Maternal Grandfather     Cancer Maternal Grandfather         kidney    Kidney disease Maternal Grandfather     Cancer Paternal Grandmother         lung= smoker    Pneumonia Paternal Grandfather     Breast cancer Neg Hx     Ovarian cancer Neg Hx      Social History     Socioeconomic History    Marital status:      Spouse name: None    Number of children: None    Years of education: None    Highest education level: None   Social Needs    Financial resource strain: None    Food insecurity - worry: None    Food insecurity - inability: None    Transportation needs - medical: None    Transportation needs - non-medical: None   Occupational History    None   Tobacco Use    Smoking status: Never Smoker    Smokeless tobacco: Never Used   Substance and Sexual Activity    Alcohol use: No    Drug use: No    Sexual activity: Yes     Partners: Male   Other Topics Concern    None   Social History Narrative    None         Medications/Allergies: See med card    Vitals:     "02/06/19 1117   BP: (!) 148/80   Pulse: 60   Weight: 60.3 kg (133 lb)   Height: 5' 7" (1.702 m)   PainSc:   4   PainLoc: Back         Physical exam:    GENERAL: A and O x3, the patient appears well groomed and is in no acute distress.  Skin: No rashes or obvious lesions  HEENT: normocephalic, atraumatic  CARDIOVASCULAR:  RRR  LUNGS: non labored breathing  ABDOMEN: soft, nontender   UPPER EXTREMITIES: Normal alignment, normal range of motion, no atrophy, no skin changes,  hair growth and nail growth normal and equal bilaterally. No swelling. Moderate tenderness to AC joint on the right. Pain with flexion at 90 degrees.   LOWER EXTREMITIES:  Normal alignment, normal range of motion, no atrophy, no skin changes,  hair growth and nail growth normal and equal bilaterally. No swelling, no tenderness.  CERVICAL SPINE:  Cervical spine: ROM is full in flexion, extension and lateral rotation with increased pain on the right side with lateral rotation and extension.   Spurling's maneuver causes no neck pain to either side.  Myofascial exam: tender to palpation along suprascapular muscle and anterior pressure of shoulder.  MENTAL STATUS: normal orientation, speech, language, and fund of knowledge for social situation.  Emotional state appropriate.       CRANIAL NERVES:  II:  PERRL bilaterally,   III,IV,VI: EOMI.    V:  Facial sensation equal bilaterally  VII:  Facial motor function normal.  VIII:  Hearing equal to finger rub bilaterally  IX/X: Gag normal, palate symmetric  XI:  Shoulder shrug equal, head turn equal  XII:  Tongue midline without fasciculations      MOTOR: Tone and bulk: normal bilateral upper and lower Strength: normal   Delt Bi Tri WE WF     R 5 5 5 5 5 5   L 5 5 5 5 5 5     IP ADD ABD Quad TA Gas HAM  R 5 5 5 5 5 5 5  L 5 5 5 5 5 5 5    SENSATION: Light touch and pinprick intact bilaterally  REFLEXES: normal, symmetric, nonbrisk.  Toes down, no clonus. No hoffmans.  GAIT: normal rise, base, steps, and arm " swing.        Imaging:  MRI C-spine 12/2017  There is a 2 mm retrolisthesis of C4 on C5 and C6 on C7.  There is reversal of the normal cervical lordosis which could relate to neck muscle spasm.The cervical vertebral bodies show normal signal intensity and height with no indication of acute fracture or pathologic marrow replacement process.    There is degenerative disc desiccation at every cervical level with marginal osteophyte formation and disc space narrowing noted at C3-C4, C4-C5, C5-C6, and to a lesser extent, C6-C7.  There is congenital spinal canal narrowing present.    The cervical cord is normal in signal intensity at all levels with no indication of myelomalacia or cord edema. The incidentally observed soft tissues of the neck show no significant abnormalities.    C2-C3: There is bilateral hypertrophic facet arthropathy.  There is left ligamentum flavum thickening.  No definite acquired central canal or neuroforaminal stenosis.    C3-C4: There is a posterior disc osteophyte complex with a superimposed broad central disc protrusion which flattens the ventral cord.  There is ligamentum flavum thickening, bilateral uncovertebral spurring, and bilateral facet arthropathy, left greater than right.  There is moderate overall central canal stenosis, moderate-severe left neuroforaminal stenosis, and moderate right neuroforaminal stenosis.    C4-C5: There is a bulging posterior disc osteophyte complex, ligamentum flavum thickening, bilateral uncovertebral spurring, and facet arthropathy, right greater than left.  There is severe bilateral neural foraminal stenosis and moderate overall central canal stenosis.  No abnormal cord signal.  There is flattening of the ventral cord.    C5-C6: There is a posterior disc osteophyte complex and bilateral uncovertebral spurring, right greater than left.  No left neuroforaminal stenosis.  There is mild-moderate right neuroforaminal stenosis and mild-moderate overall central  canal stenosis.    C6-C7: There is a 2 mm retrolisthesis of C6 on C7 with uncovering of the intervertebral disc and a superimposed posterior disc osteophyte complex.  There is bilateral uncovertebral spurring, left greater than right.  There is severe left and moderate right neuroforaminal stenosis.  There is ligamentum flavum thickening.  There is moderate central canal stenosis with flattening of the ventral cord.    C7-T1: No central canal or neuroforaminal stenosis. No disc protrusion or extrusion.    Assessment:  Mrs. Dela Cruz is a 72 y.o. female with chronic neck and shoulder pain.     1. Other spondylosis, lumbar region    2. Other spondylosis, cervical region    3. Myalgia      Plan:  1. Recommend physical therapy and home exercise regimen to improve strength   2. Continue Gabapentin 600 mg daily   3.  Schedule repeat bilateral lumbar MB RFA procedure to help with her lower back pain.  4  Patient with significant benefit following Cervical CHRISTOPHER.  Patient will continue to monitor her progress.  May consider repeat procedure in future if pain returns or worsens.   5. Follow up after procedure

## 2019-02-06 NOTE — H&P (VIEW-ONLY)
Referring Physician: No ref. provider found    PCP: Jan Olivo MD    CC:  Lower back pain    Interval History:  Mrs. Dela Cruz is a 73 y.o. female who is known patient to our clinic.  She states recurrence of her lower back pain.  Pain is a constant aching, burning pain in her lower back.  Pain radiates to her bilateral hips.  She had moderate benefit from lumbar MB RFA procedure performed in September 2018.  Pain has slowly recurred.  She desires repeat procedure. Neck pain continues to be tolerable following cervical CHRISTOPHER as well as cervical MB RFA procedures.  She denies any worsening weakness.  No bowel bladder changes.   Prior HPI:   Ayla Dela Cruz is a 73 y.o. female who presents as a new patient from Dr. Grubbs for 2.5 month history of R shoulder/mid back pain. The pain first began when she lifted some books at a bookstore in early November. She began feeling the pain in her R lateral neck/suprascapular region, with radiation down the back/side of her arm to the top of the elbow. She was treated with some topical creams, chiropractor adjustments, and then was placed on gabapentin by Dr. Grubbs, which has helped her pain a considerable amount. She states that the pain is intermittent, aching, sharp, shooting & radiates to her elbow. Worsened by sitting, or lying in bed. 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     Interventional history:  Cervical epidural steroid injection on 01/2018 and 04/2018 with moderate benefit of her neck and right shoulder pain.  - Cervical right C 4-6 on 07/2018 with 60% relief of her right-sided neck pain.  - lumbar MB RFA on 09/2018 with 60% relief of her lower back pain    ROS:  CONSTITUTIONAL: No fevers, chills, night sweats, wt. loss, appetite changes  SKIN: no rashes or itching  ENT: No headaches, head trauma, vision changes, or eye pain  LYMPH NODES: None noticed   CV: No chest pain, palpitations.   RESP: No shortness of breath, dyspnea on exertion, cough,  wheezing, or hemoptysis  GI: No nausea, emesis, diarrhea, constipation, melena, hematochezia, pain.    : No dysuria, hematuria, urgency, or frequency   HEME: No easy bruising, bleeding problems  PSYCHIATRIC: No anxiety, psychosis, hallucinations. +ve depression  NEURO: No seizures, memory loss, dizziness, +ve difficulty sleeping  MSK: +HPI      Past Medical History:   Diagnosis Date    Anticoagulant long-term use     Anxiety     Arthritis     Atrial fibrillation     Cancer     skin    CHF (congestive heart failure)     Depression     DVT (deep venous thrombosis)     GERD (gastroesophageal reflux disease)     Glaucoma (increased eye pressure)     Hyperlipidemia     diet controlled    Hypertension     Interstitial lung disease     Mild persistent asthma without complication 11/12/2018    Pneumonia 1/31/2014    Stroke 6-3-14    Stroke     TIA (transient ischemic attack)     TIA (transient ischemic attack)      Past Surgical History:   Procedure Laterality Date    2 heart ablations      3 in total    ABLATION N/A 9/7/2017    Performed by [facility] Calderon MD at [facility] CATH LAB    bilateral cataracts      BLOCK, NERVE, FACET JOINT, MEDIAL BRANCH, CERVICAL Right 6/7/2018    [facility]formed by [facility] at [facility] OR    BLOCK-NERVE-MEDIAL BRANCH-LUMBAR Bilateral 3/6/2018    [facility] by [facility] at [facility] OR    BRONCHOSCOPY N/A 5/1/2014    Performed by [facility] Eduardo [facility] at [facility] ENDO    CHOLECYSTECTOMY      CHOLECYSTECTOMY- open N/A 8/31/2016    Performed by Lavon Gonsalez MD at [facility] OR    COLONOSCOPY and EGD N/A 1/19/2017    Performed by Jonathan Schultz MD at [facility] ENDO    ESOPHAGOGASTRODUODENOSCOPY (EGD) N/A 1/19/2017    Performed by Jonathan Schultz [facility] at [facility] ENDO    INJECTION-STEROID-EPIDURAL-CERVICAL N/A 4/10/2018    [facility]formed [facility] at [facility] OR    INJECTION-STEROID-EPIDURAL-CERVICAL N/A 1/29/2018    [facility]formed by [facility] at [facility] OR    ORIF, ANKLE Right 11/1/2018     [facility] OR    pyloristenosis      RADIOFREQUENCY ABLATION, NERVE, SPINAL, LUMBAR, MEDIAL BRANCH, 1 LEVEL Bilateral 9/21/2018    [facility]formed [facility] at [facility] OR    RADIOFREQUENCY THERMAL COAGULATION, NERVE, SPINAL, CERVICAL, MEDIAL BRANCH OF POSTERIOR RAMUS Right 7/3/2018    Performed by [facility] at [facility] OR    RADIOFREQUENCY THERMOCOAGULATION (RFTC)-NERVE-MEDIAN BRANCH-LUMBAR Bilateral 3/12/2018    [facility]formed by [facility] at [facility] OR    skin cancer removal       TONSILLECTOMY      TRANSESOPHAGEAL ECHOCARDIOGRAM (FELICIA) N/A 9/7/2017    Performed by [facility]on [facility] at [facility] CATH LAB     Family History   Problem Relation Age of Onset    Heart disease Father     Ulcers Father     Arthritis Mother     Asthma Mother     Rheum arthritis Mother     Pneumonia Mother     Depression Son     Alzheimer's disease Maternal Uncle     Rheum arthritis Maternal Grandmother     Emphysema Maternal Grandfather     Cancer Maternal Grandfather         kidney    Kidney disease Maternal Grandfather     Cancer Paternal Grandmother         lung= smoker    Pneumonia Paternal Grandfather     Breast cancer Neg Hx     Ovarian cancer Neg Hx      Social History     Socioeconomic History    Marital status:      Spouse name: None    Number of children: None    Years of education: None    Highest education level: None   Social Needs    Financial resource strain: None    Food insecurity - worry: None    Food insecurity - inability: None    Transportation needs - medical: None    Transportation needs - non-medical: None   Occupational History    None   Tobacco Use    Smoking status: Never Smoker    Smokeless tobacco: Never Used   Substance and Sexual Activity    Alcohol use: No    Drug use: No    Sexual activity: Yes     Partners: Male   Other Topics Concern    None   Social History Narrative    None         Medications/Allergies: See med card    Vitals:     "02/06/19 1117   BP: (!) 148/80   Pulse: 60   Weight: 60.3 kg (133 lb)   Height: 5' 7" (1.702 m)   PainSc:   4   PainLoc: Back         Physical exam:    GENERAL: A and O x3, the patient appears well groomed and is in no acute distress.  Skin: No rashes or obvious lesions  HEENT: normocephalic, atraumatic  CARDIOVASCULAR:  RRR  LUNGS: non labored breathing  ABDOMEN: soft, nontender   UPPER EXTREMITIES: Normal alignment, normal range of motion, no atrophy, no skin changes,  hair growth and nail growth normal and equal bilaterally. No swelling. Moderate tenderness to AC joint on the right. Pain with flexion at 90 degrees.   LOWER EXTREMITIES:  Normal alignment, normal range of motion, no atrophy, no skin changes,  hair growth and nail growth normal and equal bilaterally. No swelling, no tenderness.  CERVICAL SPINE:  Cervical spine: ROM is full in flexion, extension and lateral rotation with increased pain on the right side with lateral rotation and extension.   Spurling's maneuver causes no neck pain to either side.  Myofascial exam: tender to palpation along suprascapular muscle and anterior pressure of shoulder.  MENTAL STATUS: normal orientation, speech, language, and fund of knowledge for social situation.  Emotional state appropriate.       CRANIAL NERVES:  II:  PERRL bilaterally,   III,IV,VI: EOMI.    V:  Facial sensation equal bilaterally  VII:  Facial motor function normal.  VIII:  Hearing equal to finger rub bilaterally  IX/X: Gag normal, palate symmetric  XI:  Shoulder shrug equal, head turn equal  XII:  Tongue midline without fasciculations      MOTOR: Tone and bulk: normal bilateral upper and lower Strength: normal   Delt Bi Tri WE WF     R 5 5 5 5 5 5   L 5 5 5 5 5 5     IP ADD ABD Quad TA Gas HAM  R 5 5 5 5 5 5 5  L 5 5 5 5 5 5 5    SENSATION: Light touch and pinprick intact bilaterally  REFLEXES: normal, symmetric, nonbrisk.  Toes down, no clonus. No hoffmans.  GAIT: normal rise, base, steps, and arm " swing.        Imaging:  MRI C-spine 12/2017  There is a 2 mm retrolisthesis of C4 on C5 and C6 on C7.  There is reversal of the normal cervical lordosis which could relate to neck muscle spasm.The cervical vertebral bodies show normal signal intensity and height with no indication of acute fracture or pathologic marrow replacement process.    There is degenerative disc desiccation at every cervical level with marginal osteophyte formation and disc space narrowing noted at C3-C4, C4-C5, C5-C6, and to a lesser extent, C6-C7.  There is congenital spinal canal narrowing present.    The cervical cord is normal in signal intensity at all levels with no indication of myelomalacia or cord edema. The incidentally observed soft tissues of the neck show no significant abnormalities.    C2-C3: There is bilateral hypertrophic facet arthropathy.  There is left ligamentum flavum thickening.  No definite acquired central canal or neuroforaminal stenosis.    C3-C4: There is a posterior disc osteophyte complex with a superimposed broad central disc protrusion which flattens the ventral cord.  There is ligamentum flavum thickening, bilateral uncovertebral spurring, and bilateral facet arthropathy, left greater than right.  There is moderate overall central canal stenosis, moderate-severe left neuroforaminal stenosis, and moderate right neuroforaminal stenosis.    C4-C5: There is a bulging posterior disc osteophyte complex, ligamentum flavum thickening, bilateral uncovertebral spurring, and facet arthropathy, right greater than left.  There is severe bilateral neural foraminal stenosis and moderate overall central canal stenosis.  No abnormal cord signal.  There is flattening of the ventral cord.    C5-C6: There is a posterior disc osteophyte complex and bilateral uncovertebral spurring, right greater than left.  No left neuroforaminal stenosis.  There is mild-moderate right neuroforaminal stenosis and mild-moderate overall central  canal stenosis.    C6-C7: There is a 2 mm retrolisthesis of C6 on C7 with uncovering of the intervertebral disc and a superimposed posterior disc osteophyte complex.  There is bilateral uncovertebral spurring, left greater than right.  There is severe left and moderate right neuroforaminal stenosis.  There is ligamentum flavum thickening.  There is moderate central canal stenosis with flattening of the ventral cord.    C7-T1: No central canal or neuroforaminal stenosis. No disc protrusion or extrusion.    Assessment:  Mrs. Dela Cruz is a 72 y.o. female with chronic neck and shoulder pain.     1. Other spondylosis, lumbar region    2. Other spondylosis, cervical region    3. Myalgia      Plan:  1. Recommend physical therapy and home exercise regimen to improve strength   2. Continue Gabapentin 600 mg daily   3.  Schedule repeat bilateral lumbar MB RFA procedure to help with her lower back pain.  4  Patient with significant benefit following Cervical CHRISTOPHER.  Patient will continue to monitor her progress.  May consider repeat procedure in future if pain returns or worsens.   5. Follow up after procedure

## 2019-02-19 ENCOUNTER — HOSPITAL ENCOUNTER (OUTPATIENT)
Facility: AMBULARY SURGERY CENTER | Age: 74
Discharge: HOME | End: 2019-02-19
Attending: ANESTHESIOLOGY | Admitting: ANESTHESIOLOGY
Payer: MEDICARE

## 2019-02-19 DIAGNOSIS — M47.896 OTHER SPONDYLOSIS, LUMBAR REGION: Primary | ICD-10-CM

## 2019-02-19 PROCEDURE — 64635 PR DESTROY LUMB/SAC FACET JNT: ICD-10-PCS | Mod: 50,,, | Performed by: ANESTHESIOLOGY

## 2019-02-19 PROCEDURE — 64635 DESTROY LUMB/SAC FACET JNT: CPT | Mod: LT | Performed by: ANESTHESIOLOGY

## 2019-02-19 PROCEDURE — 64635 DESTROY LUMB/SAC FACET JNT: CPT | Mod: 50,,, | Performed by: ANESTHESIOLOGY

## 2019-02-19 PROCEDURE — 99152 MOD SED SAME PHYS/QHP 5/>YRS: CPT | Mod: ,,, | Performed by: ANESTHESIOLOGY

## 2019-02-19 PROCEDURE — 64636 PR DESTROY L/S FACET JNT ADDL: ICD-10-PCS | Mod: 50,,, | Performed by: ANESTHESIOLOGY

## 2019-02-19 PROCEDURE — 99152 PR MOD CONSCIOUS SEDATION, SAME PHYS, 5+ YRS, FIRST 15 MIN: ICD-10-PCS | Mod: ,,, | Performed by: ANESTHESIOLOGY

## 2019-02-19 PROCEDURE — 64636 DESTROY L/S FACET JNT ADDL: CPT | Mod: RT | Performed by: ANESTHESIOLOGY

## 2019-02-19 PROCEDURE — 64636 DESTROY L/S FACET JNT ADDL: CPT | Mod: 50,,, | Performed by: ANESTHESIOLOGY

## 2019-02-19 RX ORDER — LIDOCAINE HYDROCHLORIDE 20 MG/ML
INJECTION, SOLUTION EPIDURAL; INFILTRATION; INTRACAUDAL; PERINEURAL
Status: SHIPPED | OUTPATIENT
Start: 2019-02-19

## 2019-02-19 RX ORDER — MIDAZOLAM HYDROCHLORIDE 2 MG/2ML
INJECTION, SOLUTION INTRAMUSCULAR; INTRAVENOUS
Status: DISCONTINUED | OUTPATIENT
Start: 2019-02-19 | End: 2019-02-19 | Stop reason: HOSPADM

## 2019-02-19 RX ORDER — METHYLPREDNISOLONE ACETATE 80 MG/ML
INJECTION, SUSPENSION INTRA-ARTICULAR; INTRALESIONAL; INTRAMUSCULAR; SOFT TISSUE
Status: SHIPPED | OUTPATIENT
Start: 2019-02-19

## 2019-02-19 RX ORDER — METHYLPREDNISOLONE ACETATE 80 MG/ML
INJECTION, SUSPENSION INTRA-ARTICULAR; INTRALESIONAL; INTRAMUSCULAR; SOFT TISSUE
Status: DISCONTINUED
Start: 2019-02-19 | End: 2019-02-19 | Stop reason: HOSPADM

## 2019-02-19 RX ORDER — SODIUM CHLORIDE, SODIUM LACTATE, POTASSIUM CHLORIDE, CALCIUM CHLORIDE 600; 310; 30; 20 MG/100ML; MG/100ML; MG/100ML; MG/100ML
INJECTION, SOLUTION INTRAVENOUS ONCE AS NEEDED
Status: COMPLETED | OUTPATIENT
Start: 2019-02-19 | End: 2019-02-19

## 2019-02-19 RX ORDER — BUPIVACAINE HYDROCHLORIDE 2.5 MG/ML
INJECTION, SOLUTION EPIDURAL; INFILTRATION; INTRACAUDAL
Status: SHIPPED | OUTPATIENT
Start: 2019-02-19

## 2019-02-19 RX ORDER — FENTANYL CITRATE 50 UG/ML
INJECTION, SOLUTION INTRAMUSCULAR; INTRAVENOUS
Status: DISCONTINUED | OUTPATIENT
Start: 2019-02-19 | End: 2019-02-19 | Stop reason: HOSPADM

## 2019-02-19 RX ORDER — LIDOCAINE HYDROCHLORIDE 20 MG/ML
INJECTION, SOLUTION EPIDURAL; INFILTRATION; INTRACAUDAL; PERINEURAL
Status: DISCONTINUED
Start: 2019-02-19 | End: 2019-02-19 | Stop reason: HOSPADM

## 2019-02-19 RX ORDER — LIDOCAINE HYDROCHLORIDE 10 MG/ML
INJECTION, SOLUTION EPIDURAL; INFILTRATION; INTRACAUDAL; PERINEURAL
Status: SHIPPED | OUTPATIENT
Start: 2019-02-19

## 2019-02-19 RX ORDER — BUPIVACAINE HYDROCHLORIDE 2.5 MG/ML
INJECTION, SOLUTION EPIDURAL; INFILTRATION; INTRACAUDAL
Status: DISCONTINUED
Start: 2019-02-19 | End: 2019-02-19 | Stop reason: HOSPADM

## 2019-02-19 RX ORDER — AMMONIA 15 % (W/V)
AMPUL (EA) INHALATION
Status: DISCONTINUED
Start: 2019-02-19 | End: 2019-02-19 | Stop reason: HOSPADM

## 2019-02-19 RX ADMIN — SODIUM CHLORIDE, SODIUM LACTATE, POTASSIUM CHLORIDE, CALCIUM CHLORIDE: 600; 310; 30; 20 INJECTION, SOLUTION INTRAVENOUS at 12:02

## 2019-02-19 NOTE — PLAN OF CARE
Vss, gaurav po fluids, denies pain, ambulates easily. IV removed, catheter intact. Discharge instructions provided and states understanding. States ready to go home.  Discharged from facility with family per wheelchair.

## 2019-02-19 NOTE — OP NOTE
PROCEDURE DATE: 2/19/2019    PROCEDURE:  Radiofrequency ablation of the L3,4,5 medial branch nerves on the bilateral-side utilizing fluoroscopy    DIAGNOSIS:  Other lumbar spondylosis  Post op Diagnosis: Same    PHYSICIAN: Galo Clancy MD    MEDICATIONS INJECTED:  From a mixture of 6ml of 0.25% bupivicaine and 80mg of methylprednisone,  1ml of this solution was injected at each level.    LOCAL ANESTHETIC USED: Lidocaine 1%, 2 ml given at each site.    SEDATION MEDICATIONS: RN IV sedation    ESTIMATED BLOOD LOSS:  None    COMPLICATIONS:  None    TECHNIQUE:  A time out was taken to identify patient and procedure side prior to starting the procedure. Laying in a prone position, the patient was prepped and draped in the usual sterile fashion using ChloraPrep and sterile towels.  The levels were determined under fluoroscopic guidance and then marked.  Local anesthetic was given by raising a wheal at the skin over each site and then infiltrated approximately 2cm deeper.  A 20-gauge  100 mm RF needle was introduced to the anatomic location of the bilateral L3,4,5 medial branch nerves.  Motor stimulation up to 2 Volts at each level confirmed no motor nerve involvement.  Impedance was less than 800 ohms at each level.  1ml of 2% lidocaine was instilled prior to lesioning.  Ablation was performed per level utilizing radiofrequency generator 80°C for 60 seconds.  Needles were then rotated 90° and a second lesion was performed.  The above noted medication was then injected slowly. The patient tolerated the procedure well.     The patient was monitored after the procedure.  Patient was given post procedure and discharge instructions to follow at home.  The patient was discharged in a stable condition

## 2019-02-19 NOTE — DISCHARGE INSTRUCTIONS
Anesthesia information    Anesthesia Safety      You have been given medicine  to sedate you during your procedure today. This may have included both a pain medicine and sleeping medicine. Most of the effects have worn off; however, you may continue to have some drowsiness for the next  24 hours. Anesthesia and pain medicines can cause nausea, sleepiness, dizziness and  constipation.    HOME CARE:  1) For the next EIGHT HOURS, you should be watched by a responsible adult to look for any worsening of your condition.  2) DO NOT DRINK any ALCOHOL for the next 24 HOURS.  3) DO NOT DRIVE or operate dangerous machinery during the next 24 HOURS.  FOLLOW UP with your doctor or this facility if you are not alert and back to your usual level of activity within 24 hrs.  GET PROMPT MEDICAL ATTENTION if any of the following occur:  -- Increased drowsiness  -- Increased weakness or dizziness  -- Repeated vomiting  -- If you cannot be awakened     Radiofrequency of Nerves    After this procedure you may have increased pain for three days and lingering pain for up to 6 weeks.   Most patients feel better after 4-6  weeks.    Use your pain medications as needed but the goal of this treartment is to wean you off your pain medication.  You may have weakness after the procedure due to the numbing injection.  You may feel a sunburn effect and some patients may need a burn cream for the skin after 2 days.    Use ice pack for discomfort :apply for 20 minutes, remove for 20 minutes for up to 2 days. Do not sleep with ice pack.  Do not use a heating pad or take tub baths or swim for 2 days.  You may shower today  Gradually increase your activities.  Dont lift anything over 10 lbs for the first 24 hours  Dont drive the day of the procedure Wait 24 hrs to drive.  Wait until tomorrow to resume any blood thinners (aspirin, Plavix, Coumadin) but you may resume all your other medications today.  If Diabetic, monitor you glucose carefully due to  steroids  used for this procedure    Seek Medical Attention if you have:  Severe pain or headache  Fever or chills  Redness or swelling around the injection site   Difficulty breathing  Vomiting or Increasing numbness or weakness in arms or legs

## 2019-02-19 NOTE — DISCHARGE SUMMARY
[facility]  Discharge Note  Short Stay    Admit Date: 2/19/2019    Discharge Date and Time: 2/19/2019    Attending Physician: Galo Clancy MD     Discharge Provider: Galo Clancy    Diagnoses:  Active Hospital Problems    Diagnosis  POA    *Other spondylosis, lumbar region [M47.896]  Yes      Resolved Hospital Problems   No resolved problems to display.       Hospital Course: Lumbar MB RFA  Discharged Condition: Good    Final Diagnoses:   Active Hospital Problems    Diagnosis  POA    *Other spondylosis, lumbar region [M47.896]  Yes      Resolved Hospital Problems   No resolved problems to display.       Disposition: Home or Self Care    Follow up/Patient Instructions:    Medications:  Reconciled Home Medications:      Medication List      CHANGE how you take these medications    LORazepam 1 MG tablet  Commonly known as:  ATIVAN  TAKE 1 TABLET BY MOUTH DAILY  What changed:    · how much to take  · how to take this  · when to take this  · additional instructions     rosuvastatin 40 MG Tab  Commonly known as:  CRESTOR  TAKE 1 TABLET (40 MG TOTAL) BY MOUTH EVERY EVENING.  What changed:  how much to take        CONTINUE taking these medications    ammonium lactate 12 % lotion  Commonly known as:  LAC-HYDRIN     ATROPINE SULFATE (PF) OPHT  Apply to eye.     COSOPT 22.3-6.8 mg/mL ophthalmic solution  Generic drug:  dorzolamide-timolol 2-0.5%  Place 1 drop into both eyes 2 (two) times daily. Twice a day     fluticasone 50 mcg/actuation nasal spray  Commonly known as:  FLONASE  1 spray (50 mcg total) by Each Nare route once daily.     fluticasone-vilanterol 200-25 mcg/dose Dsdv diskus inhaler  Commonly known as:  BREO ELLIPTA  Inhale 1 puff into the lungs once daily.     FLUZONE HIGH-DOSE 2018-19 (PF) 180 mcg/0.5 mL vaccine  Generic drug:  influenza  TO BE ADMINISTERED BY PHARMACIST FOR IMMUNIZATION     gabapentin 300 MG capsule  Commonly known as:  NEURONTIN  TAKE ONE CAPSULE BY MOUTH ONCE DAILY IN THE EVENING      ibandronate 150 mg tablet  Commonly known as:  BONIVA  Take 1 tablet (150 mg total) by mouth every 30 days.     levalbuterol 45 mcg/actuation inhaler  Commonly known as:  XOPENEX HFA  Inhale 1-2 puffs into the lungs every 4 (four) hours as needed for Wheezing. This is a rescue inhaler medication and should be used as needed     metoprolol tartrate 50 MG tablet  Commonly known as:  LOPRESSOR  TAKE 1 TABLET (50 MG TOTAL) BY MOUTH 2 (TWO) TIMES DAILY.     metroNIDAZOLE 0.75 % Lotn  APPLY A PEA SIZED AMOUNT TO FACE DAILY     pantoprazole 40 MG tablet  Commonly known as:  PROTONIX  Take 1 tablet (40 mg total) by mouth once daily.     predniSONE 5 MG tablet  Commonly known as:  DELTASONE  Take 2 tablets (10 mg total) by mouth 2 (two) times daily. may repeat for shortness of breath.     spironolactone 25 MG tablet  Commonly known as:  ALDACTONE  Take 1 tablet (25 mg total) by mouth once daily.     VIIBRYD 40 mg Tab tablet  Generic drug:  vilazodone  Take 40 mg by mouth once daily.     XARELTO 20 mg Tab  Generic drug:  rivaroxaban  TAKE 1 TABLET (20 MG TOTAL) BY MOUTH DAILY WITH DINNER OR EVENING MEAL.          Discharge Procedure Orders   Call MD for:  temperature >100.4     Call MD for:  persistent nausea and vomiting or diarrhea     Call MD for:  severe uncontrolled pain     Call MD for:  redness, tenderness, or signs of infection (pain, swelling, redness, odor or green/yellow discharge around incision site)     Call MD for:  difficulty breathing or increased cough     Call MD for:  severe persistent headache        Follow up with MD in 2-3 weeks    Discharge Procedure Orders (must include Diet, Follow-up, Activity):   Discharge Procedure Orders (must include Diet, Follow-up, Activity)   Call MD for:  temperature >100.4     Call MD for:  persistent nausea and vomiting or diarrhea     Call MD for:  severe uncontrolled pain     Call MD for:  redness, tenderness, or signs of infection (pain, swelling, redness, odor or  green/yellow discharge around incision site)     Call MD for:  difficulty breathing or increased cough     Call MD for:  severe persistent headache

## 2019-02-20 ENCOUNTER — PATIENT MESSAGE (OUTPATIENT)
Dept: PAIN MEDICINE | Facility: CLINIC | Age: 74
End: 2019-02-20

## 2019-02-20 VITALS
SYSTOLIC BLOOD PRESSURE: 141 MMHG | BODY MASS INDEX: 20.88 KG/M2 | WEIGHT: 133 LBS | TEMPERATURE: 98 F | DIASTOLIC BLOOD PRESSURE: 75 MMHG | RESPIRATION RATE: 18 BRPM | OXYGEN SATURATION: 97 % | HEART RATE: 63 BPM | HEIGHT: 67 IN

## 2019-02-21 ENCOUNTER — PATIENT MESSAGE (OUTPATIENT)
Dept: PAIN MEDICINE | Facility: CLINIC | Age: 74
End: 2019-02-21

## 2019-02-27 ENCOUNTER — PATIENT MESSAGE (OUTPATIENT)
Dept: ORTHOPEDICS | Facility: CLINIC | Age: 74
End: 2019-02-27

## 2019-02-27 DIAGNOSIS — M25.571 RIGHT ANKLE PAIN, UNSPECIFIED CHRONICITY: Primary | ICD-10-CM

## 2019-03-01 ENCOUNTER — OFFICE VISIT (OUTPATIENT)
Dept: CARDIOLOGY | Facility: CLINIC | Age: 74
End: 2019-03-01
Payer: MEDICARE

## 2019-03-01 VITALS
DIASTOLIC BLOOD PRESSURE: 62 MMHG | SYSTOLIC BLOOD PRESSURE: 132 MMHG | HEART RATE: 61 BPM | WEIGHT: 134.06 LBS | HEIGHT: 67 IN | BODY MASS INDEX: 21.04 KG/M2 | OXYGEN SATURATION: 95 %

## 2019-03-01 DIAGNOSIS — R06.09 DOE (DYSPNEA ON EXERTION): ICD-10-CM

## 2019-03-01 DIAGNOSIS — I48.0 PAROXYSMAL ATRIAL FIBRILLATION: Primary | ICD-10-CM

## 2019-03-01 DIAGNOSIS — Z86.73 H/O: CVA (CEREBROVASCULAR ACCIDENT): ICD-10-CM

## 2019-03-01 DIAGNOSIS — E78.00 PURE HYPERCHOLESTEROLEMIA: ICD-10-CM

## 2019-03-01 DIAGNOSIS — I95.2 HYPOTENSION DUE TO DRUGS: ICD-10-CM

## 2019-03-01 DIAGNOSIS — Z79.01 ANTICOAGULANT LONG-TERM USE: ICD-10-CM

## 2019-03-01 PROCEDURE — 99999 PR PBB SHADOW E&M-EST. PATIENT-LVL III: CPT | Mod: PBBFAC,,, | Performed by: INTERNAL MEDICINE

## 2019-03-01 PROCEDURE — 99999 PR PBB SHADOW E&M-EST. PATIENT-LVL III: ICD-10-PCS | Mod: PBBFAC,,, | Performed by: INTERNAL MEDICINE

## 2019-03-01 PROCEDURE — 99214 PR OFFICE/OUTPT VISIT, EST, LEVL IV, 30-39 MIN: ICD-10-PCS | Mod: S$PBB,,, | Performed by: INTERNAL MEDICINE

## 2019-03-01 PROCEDURE — 99214 OFFICE O/P EST MOD 30 MIN: CPT | Mod: S$PBB,,, | Performed by: INTERNAL MEDICINE

## 2019-03-01 PROCEDURE — 99213 OFFICE O/P EST LOW 20 MIN: CPT | Mod: PBBFAC,PO | Performed by: INTERNAL MEDICINE

## 2019-03-01 RX ORDER — DAPSONE 25 MG/1
TABLET ORAL
Refills: 0 | COMMUNITY
Start: 2019-02-20 | End: 2020-01-23 | Stop reason: CLARIF

## 2019-03-01 NOTE — PROGRESS NOTES
Subjective:    Patient ID:  Ayla Dela Cruz is a 73 y.o. female who presents for evaluation of 6 month follow-up  For PAF was on amiodarone 100 mg daily , AVILA, post AF - ablation, LVH, chronic diastolic HF, medication intolerance  PCP: Dr. Olivo, see annually, do labs  Orthopedic: Dr. Aguero  Surgeon: Dr. Gonsalez, and Dr. Dc  Rheumatologist: Dr. Pak  Prior cardiologist: Dr. Gibson, last seen over a year  Pulmonary: Dr. White  Psychologist: Nu Fermin, AGUILAR, in [place]  Gyn: Dr. Jordan  GI: Dr. Schultz  Neurologist: Dr. Ramirez  Dermatologist: Dennis Corrigan  Pain: Dr. Clancy, injections to neck and both hips very helpful  Lives with , Jan, here with patient, non-smoker, history of prostate CA,  51 years on 6/24  daughter, Lyric, source of stress  Restarted , now 2-4 times weekly, still enjoys it, playing the flute    In 6/2014:  Hospitalized 6/3/2014 for acute right sided weakness and slurred speech  DCS - Ayla Dela Cruz is a 69 y.o. female admitted to the services of hospital medicine via the ER for acute CVA. Presented with difficulty speaking, facial drooping and weakness of the right upper and lower extremities. She missed the time window for tPA. ST/PT/OT as well as cardiology and neurology were consulted. Dr. Jurado of cardiology managed A fib. Patient rapidly improved functioning with PT/OT/ST. Currently her goals with PT are met as she is ambulating over 400 feet independently.  She was not approved for IP rehab and refuses SNF. She will be discharged home with outpatient PT/ST/OT evaluation and treatment.    Neurocardio work up  CT head - Mild involutional changes and findings suggesting mild microvascular ischemic change with no acute intracranial findings    Carotid US - No hemodynamically significant stenosis is noted by velocity criteria involving either internal carotid artery.  A hemodynamically significant stenosis is defined as a stenosis of greater  than 50% of the internal carotid artery vessel lumen    ECHO, 6/4/2014, CONCLUSIONS     1 - Concentric hypertrophy. Wall thickness is mildly increased, with the septum and the posterior wall each measuring 1.1 cm across.    2 - Normal left ventricular systolic function (EF 60-65%).     3 - Mild mitral regurgitation.     4 - Left ventricular diastolic dysfunction.     5 - Pulmonary hypertension. estimated PA systolic pressure is 64 mmHg.    6 - Normal right ventricular systolic function .     7 - Suggestion for increase in LV mass from echo on 3/18/2014..     Echo bubble study also negative. Had episode of PAF during monitoring and convert with restart of Flecainide.   Since DC, improving strength in the right hand, right leg feels normal but tire easier. Speech improving. To receive PT/OT/speech today.  Medications reviewed - will DC ASA for now, and know the effects of the Limbitrol and Ativan, uses prn rarely. Some loss of appetite and taste.    Active problems in hospital  Acute left hemispheric CVA, MRI suggest multiple areas, was not on OAC nor antiplatelet Rx  PAF with high CHADS-VAS score, post ablations and DCCs  HTN with LVH  Interstitial lung disease  Pulmonary hypertension  Hyperlipidemia was not on statin    Since visit of 6/20, problem with peripheral swelling, now taking Lasix daily. Last hospitalization / CMP, 6/3 showed K+ 3.4 with normal renal function. Also noted AVILA along with exertional chest heaviness, on-and-off over past several years. Noted it with walking and have to rest. Can last up to an hour. Max grade 10/10, yesterday during the day.  in hospital. Also quite anxious, stopped Limbitrol due to side effects, managed by Dr. Olivo. Quite sedentary and major decrease in appetite, due to depression. No Psychiatrist. Home BP high 175/97. ECG shows NSR, rate 61, right axis, nonspecific T waves abnormalities, prolong QTc 483 msec. Low anterior forces.    Holter, 6/30/2014:  PREDOMINANT  "RHYTHM  1. Sinus rhythm with heart rates varying between 43 and 67 bpm with an average of 55 bpm.     VENTRICULAR ARRHYTHMIAS  1. There were frequent polymorphic PVCs totalling 1388 and averaging 40 per hour.  There was 1 couplet.    2. There were no episodes of ventricular tachycardia.    SUPRA VENTRICULAR ARRHYTHMIAS  1. There were very rare PACs totalling 47 and averaging 1 per hour.     2. There were no episodes of sustained supraventricular tachycardia.    SINUS NODE FUNCTION  1. There was no evidence of high grade SA khai block.     AV CONDUCTION  1. There was no evidence of high grade AV block.     DIARY  1. The diary was returned, but not completed    MISCELLANEOUS  1. This was a tape of adequate length (34 hrs).     Since visit of 7/24, better, reviewed by Dr. Olivo and started antidepressant Lexapro. BMP still showed mild hypokalemia of 3.3, not using Lasix at this time. Still feeling fatigue, anxiety, and request refill for Nexium. Discussed need for GI review on chronic PPI. Lack of appetite.  Lexiscan, 7/30/2014, - Nuclear Quantitative Functional Analysis:   LVEF: 66 % (normal is 55 - 69)  LVED Volume: 50 ml (normal is 60 - 98)  LVES Volume: 17 ml (normal is 20 - 42)    Impression: NORMAL MYOCARDIAL PERFUSION  1. The perfusion scan is free of evidence for myocardial ischemia or injury.   2. There is a mild intensity fixed defect in the anteroseptal wall of the left ventricle, secondary to breast attenuation.   3. Resting wall motion is physiologic.   4. Resting LV function is normal.  (normal is 55 - 69)  5. The ventricular volumes are normal at rest and stress.   6. The extracardiac distribution of radioactivity is normal.     No problem with spironolactone, BP improved, home BP similar to office readings.    Since visit of 8/14, had some distress and home monitor showed HR of 40, felt "bad" and nervous to ED, note reviewed, ECG and final pulse count 57-58. Labs reviewed - normal renal function and " "K+ 3.8. Still taking one tab of K+ daily. Not using Lasix. Explained HR discrepancy may be due to VPCs. Reviewed by Ms. Fermin and Lexapro increased to 20 mg, 2 days ago. Feeling "a lot better". Sleeping better. Still sedentary but ready to be more active. Eating better have lost additional 5 lbs since 8/2014.    Since visit of 9/5, still with speech therapy, noted progressive near syncope with SOB and irregular heart beats. Also noted some ankle swelling over the past 2 weeks. ECG shows marked bradycardia, rate 48, right axis, pulmonary pattern, 1st degree AVB. Wellbutrin 100 mg daily along with Lexapro 20 mg by Ms. Markellroni NP. BMP showed K+3.5. To use Lasix PRN    Since visit of 9/25, still with marked AVILA, only able to walk about 30' before having to stop. Bothered by increase palpitations during tapering of BB. No definite lung problem, evaluated this year. ECG shows sinus dewayne, rate 53, VPC, RBBB with suggestion of septal MI. No evidence for anemia - CBC 9/3/2014 was normal. Just started doing some activities with arm and leg exercise for the last 2 weeks, Doing some OT alternating with PT every other day.  CXR, 6/3/2014 - Lungs are clear of infiltrate and costophrenic sulci are sharp.  The cardiomediastinal silhouette appears stable.    PET scan, 4/2014 - 1.A hypermetabolic left pulmonary mass is noted with an SUV of 3.0 maximally.  A neoplastic, infectious, or granulomatous process is on the differential. Clinical correlation is suggested.  Close follow-up for resolution or biopsy would be suggested    2.  Elevated right-sided heart pressures are suspected with a large right atrium    3.  Right-sided pleural effusion    4.  Hypermetabolic thyroid gland asymmetrically on the right.  This may relate to thyroiditis, Graves' disease, or neoplastic process.  Ultrasound may be helpful.    5.  Small hypermetabolic focus in the expected location of the left ovary, a female pelvis ultrasound may be helpful for further " "evaluation.  This could relate to a uterine fibroid that has necrosed or infarcted    Biopsy of lung nodule on 5/1/2014 - negative for CA    CTA, 4/2014 - No evidence of pulmonary thromboembolism.    2.  Enlarged cardiac silhouette and secondary findings of elevated right heart pressures with diffuse mosaic lung pattern and right basilar pleural fluid suggesting cardiac decompensation/heart failure.    3. Unchanged 1 cm nodular density within the left upper lobe which previously demonstrated hypermetabolism by PET/CT and unchanged mediastinal lymphadenopathy.    4.  Subpleural nodular density within the right upper lobe is unchanged when compared with the previous study and could represent an area of remote fibrotic scarring.    5.  Heterogeneous appearance of the spleen, possibly due to the phase of contrast enhancement.  Evolving splenic infarction is not entirely excluded.    6. Suggestion of colonic wall thickening involving the descending colon.  Please correlate for symptoms of colitis.    Since visit of 10/14, rehospitalized for HF on 10/26 to 10/29/2014.  DCS - "Ayla Dela Cruz is a 69 y.o. female admitted to the services of hospital medicine via the ER for acute diastolic heart failure. C/o severe SOB and increase in leg swelling over the past two days. Under the care of Dr. Jurado. Recently was taken off metoprolol. History of paroxsymal atrial fib. Hospitalized here in June in this year for acute CVA. It was at that time, pt started Xarelto. Deficits has resolved. No history of heart failure.     Hospital Course: The patient was admitted with acute CHF biventricular and mild elevation of her troponin's at 0.029 - 0.035 and therefore an anginal equivalent was suspected. The patient also had significant hypertension upon admission and although she was not in atrial fibrillation, her EKG showed a significant first degree AV block and RBBB. Discussion was made as to whether or not to decrease the patient " "flecainide; however due to the risk of her going back into atrial fibrillation this was not done. Upon discharge the patient's lisinopril was increased to 10 mg and aldactone was added."    RHC done HEMODYNAMIC RESULTS:    LVEDP (Pre): 24 mmHg  BP: 166/104    Air rest:  PA: 90/34 (54)  PW:  (24)  RVEDP: 16  RA:  (16)    C. SUMMARY:    1. Diastolic dysfunction.  2. - Kee CO 2.64 L/min  3. - Mean systemic pressure 108.6 mmHG, stage II HTN  4. - SVR 41.16 Wood Units  5. - PVR 11.36 Wood Units  6. - Pulmonary HTN due to left sided heart disease and stage II HTN    D. RECOMMENDATIONS:    1. Routine post-cath care.  2. Cardiac rehab referral.  3. Follow up with Dr. Barrett Jurado.  4. - Aggressive BP lowering and diuresis    Repeat ECHO 11/5/2014 CONCLUSIONS     1 - Concentric remodeling. Septal wall thickness is increased, and posterior wall thickness is mildly increased, with the septum measuring 1.3 cm and the posterior wall measuring 1.1 cm across.    2 - Mildly to moderately depressed left ventricular systolic function (EF 40-45%).     3 - Left ventricular diastolic dysfunction. E/e'(lat) is 16    4 - Right ventricular enlargement with mildly depressed systolic function.     5 - Pulmonary hypertension. estimated PA systolic pressure is 56 mmHg.     6 - Intermediate central venous pressure.     7 - No evidence of intracardiac shunt.     8 - No significant change from Echo of 6/4/2014.    Active problems:  Progressive severe SOB, functional class IV  Diastolic dysfunction, elevated BNP and Troponin  Hypokalemia due to diuretics  Secondary Pulmonary HTN with peripheral edema  HTN  History of CVA 6/3/2014  PAF controlled with Flecanide  Marked bradycardia with BB  On OAC  Hematuria    At home receiving PT, OT and speech Rx twice a week, with  nurse once a week, no problem with medications. Feeling better, sleeping well. Home /73.    Since visit of 11/14, feeling "much better", concern of occasional HTN, home BP " "/66-99, HR . Lot more active, no problem. Not using walker, no CP, SOB, nor dizziness. Recent BMP - normal renal function and K+ 4.1.    Since visit of 12/19/2014, worry about HTN home readings similar to office up to . Morning reading is higher. No HA nor visual abnormalities. Xanax has helped but afraid to use too much. ECG shows sinus arrhythmia, rate of 65, late transition and LAFB. Also bothered with dry cough with the Lisinopril. And started to have return of arm pains that was attributed to atorvastatin, on Crestor. Discussed options.     Since visit of 1/16/2015, noted frequent nocturia, 8-10 times, taking losartan-HCT at night time. Have stopped using Lasix and spironolactone for past 2 months. More active without much problem. Labs normal renal function, K+ 4.2, BNP now 37, A1C 5.6%.    Since visit of 2/18/2015, improving, no further dizziness, some SOB when "stressed", usually helped with alprazolam, use only prn, about 3-6 times weekly. Compliant with medications. Been off Crestor for 6 weeks with concern of muscle problem. Home BP is fine, similar to office.     Since visit of 4/15, appetite returned, feeling a lot better. Active, walking twice a week for about half an hour. Also do calisthenic about twice a week, no problem. Seeing Dr. Zavaleta for generalized pruritis for past 3 months. Cardiac wise less AVILA. Sleeping well. Labs in 5/2015, normal CBC without eosinophilia, thyroid normal, ferritin level low normal at 21. Off Crestor for couple months due to fear of muscle pain but did not find much difference being off medication. Last Lipid in 11/2014 was good, on daily dose of Crestor. Home /79.    Since visit of 7/2/2015, feeling "great", very active without problem, no more AVILA nor dizziness with standing. Compliant with medications, don't miss. Using Tylenol 500 mg BID for arthritis. Notice easy bruising on Xarelto. Home /10.  Holter - PREDOMINANT RHYTHM  1. Sinus " arrhythmia with heart rates varying between 46 and 110 bpm with an average of 73 bpm.     VENTRICULAR ARRHYTHMIAS  1. There were very rare PVCs recorded totalling 8 and averaging less than 1 per hour.     2. There were no episodes of ventricular tachycardia.    SUPRA VENTRICULAR ARRHYTHMIAS  1. There were very frequent PACs totalling 3054 and averaging 132 per hour.  There were 29 bigeminal cycles.  There were 103 couplets.    2. 3% of complexes.    3. There were no episodes of sustained supraventricular tachycardia.    SINUS NODE FUNCTION  1. There was no evidence of high grade SA khai block.     AV CONDUCTION  1. There was no evidence of high grade AV block.     2. The longest RR interval was 1565 msec.     DIARY  1. The diary was properly completed.     2. There was 1 episode of skipping beats reported. The corresponding rhythm strips revealed the following:             During event 1 the rhythm was sinus rhythm at 75 bpm.     3. There was 1 episode of skipped beat reported. The corresponding rhythm strips revealed the following:             During event 1 the rhythm was sinus arrhythmia at 63 bpm.     MISCELLANEOUS  1. This was a tape of adequate length (23 hrs).     Since visit of 10/15, place on new antidepressant, Venlafaxine 75 mg daily, tried 2 and developed rapid HR to 125 bpm, feels more anxious, now switched to Citalopram. Also noted the daily dose of Lorazepam does not seem adequate. Orthostatic VS is positive with lying 142/73, , standing 120/62, . Been taking spironolactone 25 mg for last 3 days. Does feel thirsty. Labs reviewed A1C 5.8%, CBC, BMP were WNL. Lipid shows LDL 99, non-. Goal preferred is <70 and < 100. No problem with 10 mg of Crestor. Had vacation at [place] [employer] [place], walked all over without problem.    Since visit of 1/18/2016, no new problem. 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 Labs with , non-, hsCRP at 2.56.     In  "10/2016, had acute GB attack with rupture in 8/2016, then tried on Wellbutrin took only 1-2 tabs and BP up to 201/100 with head tightness. Subsequently back to normal, the last 2.5 days took Losartan bid. Discussed Rx options for HTN. Advised to be more active, regular exercise. Lab reviewed LDL in 8/2016 93.     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 Still with daily palpitations, last up to half hours. Have electronic watch, Davidtara Barroso, since 9/2016, suppose harmonize patient with the universe. Feeling better if on during the day. Lot of walking at school, no problem.   Holter in 10/2016 - PREDOMINANT RHYTHM  1. Sinus rhythm with heart rates varying between 52 and 144 bpm with an average of 75 bpm.     2. Paroxysmal atrial fibrillation    VENTRICULAR ARRHYTHMIAS  1. There were occasional mostly monomorphic PVCs totalling 596 and averaging 13 per hour.     2. There were no episodes of ventricular tachycardia.    SUPRA VENTRICULAR ARRHYTHMIAS  1. There were frequent PACs totalling 2982 and averaging 69 per hour.  There were 69 bigeminal cycles.  There were 55 couplets.    2. There were no episodes of sustained supraventricular tachycardia.    3. Paroxysmal atrial fibrillation was noted in the the study.     SINUS NODE FUNCTION  1. There was no evidence of high grade SA khai block.     AV CONDUCTION  1. There was no evidence of high grade AV khai filtering.     2. The longest RR interval was 1725 msec.     DIARY  1. The diary was returned, but not completed    MISCELLANEOUS  1. This was a tape of adequate length (43 hrs).     ECHO CONCLUSIONS     1 - Hyperdynamic left ventricular systolic function (EF 65-70%).     2 - Normal left ventricular diastolic function.     3 - Normal right ventricular systolic function .     4 - Pulmonary hypertension. The estimated PA systolic pressure is 64 mmHg.     5 - Less evidence for LVH from Echo in 11/2014.     In 5/2017, bothered by recurrent PAF with AVILA after 10 " "feet walking. Felt better before on Flecainide 100 mg bid, but Holter continued to show PAF. Attempt up titration, problem with itching, switched to propafenone 150 mg tid, continued with itching and reviewed by allergist, treated with 10 days of cortisone with benefit. No hive and no itching, just don't feel good due to the irregular rhythm. History reviewed, had 2 prior AF ablation, last in [place], [place] in around 2000, recall being told not to do again. Recall seeing an [facility] EP doc but didn't like him. Discussed various options. Will stop antiarrhythmic for now, will be going on a 16 days trip to [place]. Reviewed prior medications, have taken Tenormin, metoprolol tartrate, and short-acting diltiazem in the past without problem. Refer to Dr. Calderon for review. ECG in AF, rate 99, PRWP, LAFB    In 11/2017, post AF ablation, felt good for a few weeks, noted some SOB and had review with Dr. Calderon on 11/1 - 72 year old female with a longstanding history of atrial arrhythmias. Her [id number] Score is 5 (age, female, TIA, HTN) so she should remain on anticoagulation indefinitely. She has failed Flecainide. She is now 6 weeks post-PVI and MA flutter line, and maintaining sinus rhythm on low-dose Amiodarone. Given her intermittent AVILA which started post-ablation, I have stopped Amiodarone which I think is the likely culprit. I instructed her to continue taking Lasix PRN, as well as metoprolol and Xarelto." ECG on 11/1 was in NSR, rate 61, PRWP.  Recommended to stop amiodarone but then started to feel the palpitation daily, felt nervous and at the same time being taper down on the nightly Ativan. Did have some breakthrough anxiety attacks. Also had strained the upper back and right arm / shoulder, requesting some Tramadol, tried Jan's Tramadol with good relief. Understand this is an opoid. Used Excedrin with caffeine and bothered by more palpitations.    In 3/2018, here for recurrent palpitations, no inciting " "factors over the past 6 weeks. Had review with Dr. Calderon in 2/2018 - "72 year old female with a longstanding history of atrial arrhythmias and prior ablations at outside institutions. Her [id number] Score is 5 (age, female, TIA, HTN) so she should remain on anticoagulation indefinitely. She is now 5 months post-PVI and MA flutter line, and maintaining sinus rhythm off Amiodarone. The plan is to continue Xarelto, and to see me again in 6 months." Palpitations appears associated with AVILA, had difficulties walking in house or up a ramp. Started 100 mg of amiodarone last week, daily, feels some benefit. ECG today in Sinus rhythm vs wandering atrial pacemaker with frequent PJC, rate 59. Also taking Losartan in the morning appears more effective.  Holter 11/2017 - PREDOMINANT RHYTHM  1. Sinus arrhythmia with heart rates varying between 39 and 102 bpm with an average of 58 bpm.     VENTRICULAR ARRHYTHMIAS  1. There were occasional PVCs totalling 728 and averaging 15 per hour.  There were 5 bigeminal cycles.     2. There were no episodes of ventricular tachycardia.    SUPRA VENTRICULAR ARRHYTHMIAS  1. There was no supraventricular ectopic activity.    SINUS NODE FUNCTION  1. There was no evidence of high grade SA khai block.     AV CONDUCTION  1. There was no evidence of high grade AV block.     2. The longest RR interval was 1820 msec.     DIARY  1. The diary was not returned    MISCELLANEOUS  1. There were occasional hookup related artifacts. Overall, the study was of good quality.     2. This was a tape of adequate length (48 hrs).     in 5/2018, no new problem, still concern of AVILA, usually with walking, variable as little 10 feet or fine with some long walk, can play flute for an hour but find difficulties in holding a note. Off daily amiodarone, uses it prn for palpitation, find helpful. Labs reviewed from 1/2018, TSH, Vitamin D, LDL 68.2, A1C 5.9%, CMP - normal renal function, K+ 3.6. To go to [place] for a month " "in 8/2018.    In 9/2018, return from a month family reunion trip to [place]. Problem with hypotension with Tramadol and Losartan. Had review with Dr. Calderon on 9/5 "Ayla Dela Cruz is a 73 year old female with a longstanding history of atrial arrhythmias and prior ablations at outside institutions. Her [id number] Score is 5 (age, female, TIA, HTN) so she should remain on anticoagulation indefinitely. She is now 11 months post-PVI and MA flutter line, and maintaining sinus rhythm off Amiodarone. However, she is feeling poorly, with frequent PACs and borderline hypotension. The plan is to stop Losartan, echo in next several weeks, Holter monitor in 6 weeks, and follow-up with me in 8 weeks."  Off both medication on 8/31, no problem now. No heart worry. Denies any CP nor SOB. Still teaching. ECG on 9/5 shows NSR with sinus arrhythmia.    HPI comments: in 3/2019, return for follow up, had syncopal spell with hypotension at [employer] required reconstruction of the right ankle, now in PT, doing well. No heart complication, no AF. LDL 90.6 in 1/2019. 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 No heart worries, no CP nor SOB, much better, breathing improved with daily Breo use. Discussed option with NOAC.    ROS    Constitutional: negative for chills, fatigue, fevers, sweats, no further slurred speech, and no dysarthria, appetite improved, intolerance to heat, bruises easily on NOAC and steroid, weight stable since 3/2018.  Eyes: negative for icterus, redness and visual disturbance, have visual halo, no more bleed in the right Iris  Respiratory: negative for asthma, cough have resolved off ACE-I, have dyspnea on exertion, occasional SOB with HR 85 to 100 bpm, have lifelong snoring, Brandamore score 7 improved down to 5, no hemoptysis, pleurisy/chest pain and wheezing  Cardiovascular: negative for chest pain, chest pressure/discomfort, no further orthostatic dizziness, and no palpitations, no paroxysmal " "nocturnal dyspnea and syncope  Gastrointestinal: negative for abdominal pain, nausea and vomiting, have GERD  Musculoskeletal:positive for arthralgias, and less right sided muscle weakness with improvement in leg strength, improved bilateral ankle pains - OA and no myalgias  Neurological: negative for coordination problems, dizziness, gait problems, headaches, paresthesia, have some tremors but able to draw and no vertigo  Psych: positive for depression, nervousness, anxiety, less stressed due to 's have improved    Objective:    Physical Exam    General appearance: alert, appears stated age, cooperative and no distress, decrease skin turgor.  Head: Normocephalic, without obvious abnormality, atraumatic  Eyes:  conjunctivae/corneas clear. PERRL, EOM's intact.    Neck: no adenopathy, no carotid bruit, no JVD, supple, symmetrical, trachea midline and thyroid not enlarged, symmetric, no tenderness/mass/nodules  Lungs:  clear to auscultation bilaterally  Chest wall: no tenderness  Heart: regular rate and rhythm, normal S1, S2 normal, occasional grade 2/6 systolic murmur, click, rub or gallop    Abdomen: soft, non-tender; bowel sounds normal; no masses,  no organomegaly, waist 31" hip 42"  Extremities: extremities no further weakness of the right upper extremity, atraumatic, no cyanosis and have no edema, minor varicose veins  Pulses: 2+ and symmetric    Assessment:       1. Paroxysmal atrial fibrillation, ablations X 2 1995, 1997, CHADS-VAS score 5, HAS-BLED score 5     2. Pure hypercholesterolemia    3. Hypotension due to drugs    4. Anticoagulant long-term use    5. BMI 24.8 decreased to 20.9    6. AVILA (dyspnea on exertion)    7. H/O: CVA (cerebrovascular accident), June 2014         Plan:       Ayla was seen today for follow-up.    Diagnoses and associated orders for this visit:    Paroxysmal atrial fibrillation, ablations X 2 1995, 1997, CHADS-VAS score 5, HAS-BLED score 5   -     apixaban (ELIQUIS) 5 mg " Tab; Take 1 tablet (5 mg total) by mouth 2 (two) times daily.  Dispense: 60 tablet; Refill: 11    Pure hypercholesterolemia    Hypotension due to drugs    Anticoagulant long-term use  -     apixaban (ELIQUIS) 5 mg Tab; Take 1 tablet (5 mg total) by mouth 2 (two) times daily.  Dispense: 60 tablet; Refill: 11    BMI 24.8 decreased to 20.9    AVILA (dyspnea on exertion)    H/O: CVA (cerebrovascular accident), June 2014      - All medical issues reviewed, continue current Rx.  - CV status stable, off antiarrhythmic, all medications reviewed, patient acknowledge good understanding.  - Recommend using Tylenol as first line pain medications.  - Instruction for Mediterranean diet and heart healthy exercise given.  - Need good exercise program, 4 key elements: 1. Aerobic (walking, swimming, dancing, jogging, biking, etc, 2. Muscle strengthening / resistance exercise, need to do 2-3 times weekly, 3. Stretching daily, good stretch takes a whole  total minute. 4. Balance exercise daily.  - Highly recommend 30 minutes of exercise daily, can have Sunday off, with 2-3 sessions of muscle strengthening weekly. A  would be very helpful.  - Recommend at least biannual cardiovascular evaluation in view of her significant risk factors, patient 's preference      Chronic pruritus    Depression - improved    Stress at home - improved  - Consider Yoga, Gilberto Chi, or meditation    Elevated brain natriuretic peptide (BNP) level, range 98 - 1045 - improved     Pulmonary hypertension, with right sided congestive heart failure, acute    Chest pain on exertion - resolved    Peripheral edema - imporved    Anxiety - imporved    Patient Active Problem List   Diagnosis    Paroxysmal atrial fibrillation, ablations X 3 1995, 1997, 9/2017, CHADS-VAS score 5, HAS-BLED score 5     Hypertension, 1985    Hyperlipidemia, baseline     GERD (gastroesophageal reflux disease)    Glaucoma (increased eye pressure)    Fibroid uterus     Thickened endometrium    Abnormal CT scan, chest    Interstitial lung disease    H/O: CVA (cerebrovascular accident), June 2014    LVH (left ventricular hypertrophy) due to hypertensive disease    Cardiomegaly    BMI 24.8 decreased to 20.9    Depression    AVILA (dyspnea on exertion)    VPC (ventricular premature complex)    OA (osteoarthritis)    Elevated brain natriuretic peptide (BNP) level, range 98 - 1045    Microalbuminuria    Hypoalbuminemia    TIA (transient ischemic attack), 11/3/2014    Statin intolerance    Chronic pruritus, onset 3/2015    Hypotension due to drugs    Cancer screening    Reflux esophagitis    Colon cancer screening    Atrial flutter    S/P ablation of atrial fibrillation    S/P ablation of atrial flutter    JOSE (generalized anxiety disorder)    Muscle strain    Cervical radiculitis    Other spondylosis, lumbar region    Premature junctional contractions    Heart palpitations    Cervical spondylosis    Lumbar radiculitis    Fracture of right ankle, lateral malleolus    Mild persistent asthma without complication    Anticoagulant long-term use     Total face-to-face time with the patient was 30 minutes and greater than 50% was spent in counseling and coordination of care. The above assessment and plan have been discussed at length. Labs and procedure over the last 6 months reviewed. Problem List updated. Asked to bring in all active medications / pills bottles with next visit.

## 2019-03-04 ENCOUNTER — OFFICE VISIT (OUTPATIENT)
Dept: ORTHOPEDICS | Facility: CLINIC | Age: 74
End: 2019-03-04
Payer: MEDICARE

## 2019-03-04 ENCOUNTER — HOSPITAL ENCOUNTER (OUTPATIENT)
Dept: RADIOLOGY | Facility: HOSPITAL | Age: 74
Discharge: HOME | End: 2019-03-04
Attending: ORTHOPAEDIC SURGERY
Payer: MEDICARE

## 2019-03-04 VITALS
WEIGHT: 134.06 LBS | HEART RATE: 68 BPM | BODY MASS INDEX: 21.04 KG/M2 | HEIGHT: 67 IN | DIASTOLIC BLOOD PRESSURE: 56 MMHG | SYSTOLIC BLOOD PRESSURE: 117 MMHG

## 2019-03-04 DIAGNOSIS — M25.571 RIGHT ANKLE PAIN, UNSPECIFIED CHRONICITY: ICD-10-CM

## 2019-03-04 DIAGNOSIS — S82.61XD CLOSED DISPLACED FRACTURE OF LATERAL MALLEOLUS OF RIGHT FIBULA WITH ROUTINE HEALING, SUBSEQUENT ENCOUNTER: Primary | ICD-10-CM

## 2019-03-04 PROCEDURE — 73610 X-RAY EXAM OF ANKLE: CPT | Mod: 26,RT,, | Performed by: RADIOLOGY

## 2019-03-04 PROCEDURE — 73610 XR ANKLE COMPLETE 3 VIEW RIGHT: ICD-10-PCS | Mod: 26,RT,, | Performed by: RADIOLOGY

## 2019-03-04 PROCEDURE — 99213 OFFICE O/P EST LOW 20 MIN: CPT | Mod: S$PBB,,, | Performed by: ORTHOPAEDIC SURGERY

## 2019-03-04 PROCEDURE — 73610 X-RAY EXAM OF ANKLE: CPT | Mod: TC,PN,RT

## 2019-03-04 PROCEDURE — 99999 PR PBB SHADOW E&M-EST. PATIENT-LVL III: CPT | Mod: PBBFAC,,, | Performed by: ORTHOPAEDIC SURGERY

## 2019-03-04 PROCEDURE — 99999 PR PBB SHADOW E&M-EST. PATIENT-LVL III: ICD-10-PCS | Mod: PBBFAC,,, | Performed by: ORTHOPAEDIC SURGERY

## 2019-03-04 PROCEDURE — 99213 PR OFFICE/OUTPT VISIT, EST, LEVL III, 20-29 MIN: ICD-10-PCS | Mod: S$PBB,,, | Performed by: ORTHOPAEDIC SURGERY

## 2019-03-04 PROCEDURE — 99213 OFFICE O/P EST LOW 20 MIN: CPT | Mod: PBBFAC,25,PN | Performed by: ORTHOPAEDIC SURGERY

## 2019-03-05 NOTE — PROGRESS NOTES
Past Medical History:   Diagnosis Date    Anticoagulant long-term use     Anxiety     Arthritis     Atrial fibrillation     Cancer     skin    CHF (congestive heart failure)     Depression     DVT (deep venous thrombosis)     GERD (gastroesophageal reflux disease)     Glaucoma (increased eye pressure)     Hyperlipidemia     diet controlled    Hypertension     Interstitial lung disease     Mild persistent asthma without complication 11/12/2018    Pneumonia 1/31/2014    Stroke 6-3-14    Stroke     TIA (transient ischemic attack)     TIA (transient ischemic attack)        Past Surgical History:   Procedure Laterality Date    2 heart ablations      3 in total    ABLATION N/A 9/7/2017    [facility] C[facility] LAB    bilateral cataracts      BLOCK, NERVE, FACET JOINT, MEDIAL BRANCH, CERVICAL Right 6/7/2018    Performed by Galo Clancy [facility] at [facility]    BLOCK-NERVE-MEDIAL BRANCH-LUMBAR Bilateral 3/6/2018    Performed [facility] at [facility]    BRONCHOSCOPY N/A 5/1/2014    Performed by Jose Eduardo White MD at [facility] ENDO    CHOLECYSTECTOMY      CHOLECYSTECTOMY- open N/A 8/31/2016    Performed by Lavon Gonsalez MD at [facility] OR    COLONOSCOPY and EGD N/A 1/19/2017    Performed by Jonathan Schultz MD at [facility] ENDO    ESOPHAGOGASTRODUODENOSCOPY (EGD) N/A 1/19/2017    Performed by Jonathan Schultz MD at [facility] ENDO    INJECTION-STEROID-EPIDURAL-CERVICAL N/A 4/10/2018    Performed by [facility] Clancy [facility] at [facility]    INJECTION-STEROID-EPIDURAL-CERVICAL N/A 1/29/2018    Performed by [facility] at [facility]    ORIF, ANKLE Right 11/1/2018    Performed by Wilfredo Aguero MD at [facility] OR    pyloristenosis      Radiofrequency Ablation, Nerve, Spinal, Lumbar, Medial Branch, 1 Level Bilateral 2/19/2019    Performed by Galo Clancy [facility] at [facility]    RADIOFREQUENCY ABLATION, NERVE, SPINAL, LUMBAR, MEDIAL BRANCH, 1 LEVEL Bilateral 9/21/2018    Performed by Galo Clancy MD at [facility]     RADIOFREQUENCY THERMAL COAGULATION, NERVE, SPINAL, CERVICAL, MEDIAL BRANCH OF POSTERIOR RAMUS Right 7/3/2018    Performed by [facility] at [facility] OR    RADIOFREQUENCY THERMOCOAGULATION (RFTC)-NERVE-MEDIAN BRANCH-LUMBAR Bilateral 3/12/2018    [facility] at [facility] OR    skin cancer removal       TONSILLECTOMY      TRANSESOPHAGEAL ECHOCARDIOGRAM (FELICIA) N/A 9/7/2017    Performed by Fco Calderon MD at [facility] CATH LAB       Current Outpatient Medications   Medication Sig    ammonium lactate (LAC-HYDRIN) 12 % lotion     apixaban (ELIQUIS) 5 mg Tab Take 1 tablet (5 mg total) by mouth 2 (two) times daily.    ATROPINE SULFATE, PF, OPHT Apply to eye.    dapsone 25 MG Tab TAKE 2 TABLETS BY MOUTH ONCE DAILY RECHECK CBC IN 1MONTH    dorzolamide-timolol (COSOPT) 2-0.5 % ophthalmic solution Place 1 drop into both eyes 2 (two) times daily. Twice a day    fluticasone (FLONASE) 50 mcg/actuation nasal spray 1 spray (50 mcg total) by Each Nare route once daily.    fluticasone-vilanterol (BREO ELLIPTA) 200-25 mcg/dose DsDv diskus inhaler Inhale 1 puff into the lungs once daily.    FLUZONE HIGH-DOSE 2018-19, PF, 180 mcg/0.5 mL vaccine TO BE ADMINISTERED BY PHARMACIST FOR IMMUNIZATION    gabapentin (NEURONTIN) 300 MG capsule TAKE ONE CAPSULE BY MOUTH ONCE DAILY IN THE EVENING    ibandronate (BONIVA) 150 mg tablet Take 1 tablet (150 mg total) by mouth every 30 days.    levalbuterol (XOPENEX HFA) 45 mcg/actuation inhaler Inhale 1-2 puffs into the lungs every 4 (four) hours as needed for Wheezing. This is a rescue inhaler medication and should be used as needed    lorazepam (ATIVAN) 1 MG tablet TAKE 1 TABLET BY MOUTH DAILY (Patient taking differently: TAKE 1 TABLET BY MOUTH DAILY PRN)    metoprolol tartrate (LOPRESSOR) 50 MG tablet TAKE 1 TABLET (50 MG TOTAL) BY MOUTH 2 (TWO) TIMES DAILY.    metroNIDAZOLE 0.75 % Lotn APPLY A PEA SIZED AMOUNT TO FACE DAILY    predniSONE (DELTASONE) 5 MG tablet Take 2 tablets  (10 mg total) by mouth 2 (two) times daily. may repeat for shortness of breath.    rosuvastatin (CRESTOR) 40 MG Tab TAKE 1 TABLET (40 MG TOTAL) BY MOUTH EVERY EVENING. (Patient taking differently: Take 20 mg by mouth every evening. )    spironolactone (ALDACTONE) 25 MG tablet Take 1 tablet (25 mg total) by mouth once daily.    VIIBRYD 40 mg Tab tablet Take 40 mg by mouth once daily.    pantoprazole (PROTONIX) 40 MG tablet Take 1 tablet (40 mg total) by mouth once daily. (Patient taking differently: Take 40 mg by mouth once daily. )     No current facility-administered medications for this visit.      Facility-Administered Medications Ordered in Other Visits   Medication    bupivacaine (PF) 0.25% (2.5 mg/ml) injection    lidocaine (PF) 10 mg/ml (1%) injection    lidocaine (PF) 20 mg/ml (2%) injection    methylPREDNISolone acetate injection       Review of patient's allergies indicates:   Allergen Reactions    Atorvastatin      Other reaction(s): Joint pain    Augmentin [amoxicillin-pot clavulanate]      Other reaction(s): Mental Status Change    Baclofen (bulk) Nausea And Vomiting    Ciprofloxacin (bulk)     Decongest tabs      Other reaction(s): increased heart rate    Erythromycin Other (See Comments)    Flecainide Hives     And SOB. No reaction to Lidocaine     Fluoxetine      Other reaction(s): heart palpitations  Other reaction(s): anxiety    Lisinopril Other (See Comments)     cough    Losartan Other (See Comments)     Hypotension with lightheadedness    Morphine Other (See Comments)     confusion    Tramadol Other (See Comments)     SOB, low BP    Venlafaxine analogues      Changes in BP and increases heart rate       Afrin [oxymetazoline] Palpitations    Caffeine Palpitations    Tizanidine Anxiety     dizziness       Family History   Problem Relation Age of Onset    Heart disease Father     Ulcers Father     Arthritis Mother     Asthma Mother     Rheum arthritis Mother      Pneumonia Mother     Depression Son     Alzheimer's disease Maternal Uncle     Rheum arthritis Maternal Grandmother     Emphysema Maternal Grandfather     Cancer Maternal Grandfather         kidney    Kidney disease Maternal Grandfather     Cancer Paternal Grandmother         lung= smoker    Pneumonia Paternal Grandfather     Breast cancer Neg Hx     Ovarian cancer Neg Hx        Social History     Socioeconomic History    Marital status:      Spouse name: Not on file    Number of children: Not on file    Years of education: Not on file    Highest education level: Not on file   Social Needs    Financial resource strain: Not on file    Food insecurity - worry: Not on file    Food insecurity - inability: Not on file    Transportation needs - medical: Not on file    Transportation needs - non-medical: Not on file   Occupational History    Not on file   Tobacco Use    Smoking status: Never Smoker    Smokeless tobacco: Never Used   Substance and Sexual Activity    Alcohol use: No    Drug use: No    Sexual activity: Yes     Partners: Male   Other Topics Concern    Not on file   Social History Narrative    Not on file       Chief Complaint:   Chief Complaint   Patient presents with    Right Ankle - Injury, Post-op Evaluation       Consulting Physician: No ref. provider found    History of present illness: This is a 73 y.o. female following up for right ankle lateral malleolus ORIF 11-1-18.      Review of Systems:    Constitution: Denies chills, fever, and sweats.    HENT: Denies headaches or blurry vision.    Cardiovascular: Denies chest pain or irregular heart beat.    Respiratory: Denies cough or shortness of breath.    Gastrointestinal: Denies abdominal pain, nausea, or vomiting.    Musculoskeletal:  Denies muscle cramps.    Neurological: Denies dizziness or focal weakness.    Psychiatric/Behavioral: Normal mental status.    Hematologic/Lymphatic: Denies bleeding problem or easy  bruising/bleeding.    Skin: Denies rash or suspicious lesions.      Examination:    Vital Signs:    Vitals:    03/04/19 1410   BP: (!) 117/56   Pulse: 68       Body mass index is 20.99 kg/m².    This a well-developed, well nourished patient in no acute distress.    Alert and oriented and cooperative to examination.       Physical Exam: right ankle    Inspection/Observation   Swelling:   none  Erythema:   none  Bruising:   none  Wounds:   healed  Drainage:  None    Range of Motion   Normal    Muscle Strength   5/5    Other   Sensation:  normal  Pulse:    palpable    Imaging: Normal post-operative XR with healing     Assessment: Closed displaced fracture of lateral malleolus of right fibula with routine healing, subsequent encounter        Plan: Doing well. She has full range of motion and no pain.  Will see her back as needed.    DISCLAIMER: This note may have been dictated using voice recognition software and may contain grammatical errors. Report sent to referring provider as required.

## 2019-03-13 ENCOUNTER — TELEPHONE (OUTPATIENT)
Dept: PHYSICAL MEDICINE AND REHAB | Facility: CLINIC | Age: 74
End: 2019-03-13

## 2019-03-14 ENCOUNTER — TELEPHONE (OUTPATIENT)
Dept: PHYSICAL MEDICINE AND REHAB | Facility: CLINIC | Age: 74
End: 2019-03-14

## 2019-03-14 ENCOUNTER — PATIENT MESSAGE (OUTPATIENT)
Dept: PAIN MEDICINE | Facility: CLINIC | Age: 74
End: 2019-03-14

## 2019-03-14 RX ORDER — GABAPENTIN 300 MG/1
CAPSULE ORAL
Qty: 30 CAPSULE | Refills: 2 | Status: SHIPPED | OUTPATIENT
Start: 2019-03-14 | End: 2019-03-15 | Stop reason: SDUPTHER

## 2019-03-14 NOTE — TELEPHONE ENCOUNTER
Informed patient she will need a follow up appt for any refills.  Patient hasn't been seen in over a year. Advised patient she can call her pcp for a refills

## 2019-03-14 NOTE — TELEPHONE ENCOUNTER
----- Message from Roby Christopher sent at 3/14/2019  1:49 PM CDT -----  Contact: pt  Type:  RX Refill Request    Who Called:  pt  Refill or New Rx:  refill  RX Name and Strength:  gabapentin (NEURONTIN) 300 MG capsule  How is the patient currently taking it? (ex. 1XDay):  1 x day  Is this a 30 day or 90 day RX:  30  Preferred Pharmacy with phone number:    [facility]/pharmacy #[id number] - [place] - [address]  [address] [place] [address]  Phone: [phone] Fax: [phone]    Local or Mail Order:    Ordering Provider:    Best Call Back Number:  [phone]  Additional Information:  Pt states the pharmacy has called 4 times requesting this refill from Dr Johnson. Pt is out of this medication. The electronic failed when sending this morning. Pt is wanting a call once this has been sent in.

## 2019-03-15 RX ORDER — GABAPENTIN 300 MG/1
CAPSULE ORAL
Qty: 30 CAPSULE | Refills: 2 | Status: SHIPPED | OUTPATIENT
Start: 2019-03-15 | End: 2019-04-08 | Stop reason: SDUPTHER

## 2019-03-15 NOTE — TELEPHONE ENCOUNTER
----- Message from Ricki Crouch sent at 3/14/2019  2:37 PM CDT -----  Type:  RX Refill Request    Who Called:  Patient  Refill or New Rx:  Refill  RX Name and Strength:  Gabapentin  Preferred Pharmacy with phone number:    [facility]/pharmacy #[id number] - [place] - [address]  [address] [place] [address]  Phone: [phone] Fax: [phone]  Local or Mail Order:  Local  Ordering Provider:  Same  Best Call Back Number:  [phone] (home) [phone] (work)

## 2019-03-15 NOTE — TELEPHONE ENCOUNTER
Patient has been informed she will need to make an appt for any refills.  Patient has been seen in over a year.  Patient verbalized understanding.

## 2019-03-21 ENCOUNTER — PATIENT MESSAGE (OUTPATIENT)
Dept: CARDIOLOGY | Facility: CLINIC | Age: 74
End: 2019-03-21

## 2019-03-25 ENCOUNTER — PATIENT MESSAGE (OUTPATIENT)
Dept: FAMILY MEDICINE | Facility: CLINIC | Age: 74
End: 2019-03-25

## 2019-03-25 RX ORDER — LOSARTAN POTASSIUM 100 MG/1
TABLET ORAL
Qty: 90 TABLET | Refills: 3 | Status: ON HOLD | OUTPATIENT
Start: 2019-03-25 | End: 2019-09-10

## 2019-04-08 RX ORDER — GABAPENTIN 300 MG/1
CAPSULE ORAL
Qty: 30 CAPSULE | Refills: 0 | Status: SHIPPED | OUTPATIENT
Start: 2019-04-08 | End: 2019-04-24 | Stop reason: SDUPTHER

## 2019-04-10 ENCOUNTER — OFFICE VISIT (OUTPATIENT)
Dept: PAIN MEDICINE | Facility: CLINIC | Age: 74
End: 2019-04-10
Payer: MEDICARE

## 2019-04-10 VITALS
HEART RATE: 68 BPM | WEIGHT: 134 LBS | DIASTOLIC BLOOD PRESSURE: 70 MMHG | HEIGHT: 67 IN | BODY MASS INDEX: 21.03 KG/M2 | SYSTOLIC BLOOD PRESSURE: 131 MMHG

## 2019-04-10 DIAGNOSIS — M47.892 OTHER SPONDYLOSIS, CERVICAL REGION: ICD-10-CM

## 2019-04-10 DIAGNOSIS — M79.10 MYALGIA: ICD-10-CM

## 2019-04-10 DIAGNOSIS — G89.29 CHRONIC MIDLINE LOW BACK PAIN WITHOUT SCIATICA: ICD-10-CM

## 2019-04-10 DIAGNOSIS — M47.896 OTHER SPONDYLOSIS, LUMBAR REGION: Primary | ICD-10-CM

## 2019-04-10 DIAGNOSIS — M54.50 CHRONIC MIDLINE LOW BACK PAIN WITHOUT SCIATICA: ICD-10-CM

## 2019-04-10 PROCEDURE — 99999 PR PBB SHADOW E&M-EST. PATIENT-LVL V: CPT | Mod: PBBFAC,,, | Performed by: PHYSICIAN ASSISTANT

## 2019-04-10 PROCEDURE — 99213 OFFICE O/P EST LOW 20 MIN: CPT | Mod: S$PBB,,, | Performed by: PHYSICIAN ASSISTANT

## 2019-04-10 PROCEDURE — 99215 OFFICE O/P EST HI 40 MIN: CPT | Mod: PBBFAC,PN | Performed by: PHYSICIAN ASSISTANT

## 2019-04-10 PROCEDURE — 99999 PR PBB SHADOW E&M-EST. PATIENT-LVL V: ICD-10-PCS | Mod: PBBFAC,,, | Performed by: PHYSICIAN ASSISTANT

## 2019-04-10 PROCEDURE — 99213 PR OFFICE/OUTPT VISIT, EST, LEVL III, 20-29 MIN: ICD-10-PCS | Mod: S$PBB,,, | Performed by: PHYSICIAN ASSISTANT

## 2019-04-10 RX ORDER — TIZANIDINE 4 MG/1
4 TABLET ORAL EVERY 8 HOURS PRN
Qty: 45 TABLET | Refills: 0 | Status: SHIPPED | OUTPATIENT
Start: 2019-04-10 | End: 2019-04-25

## 2019-04-10 NOTE — PATIENT INSTRUCTIONS
Exercises To Strengthen Your Lower Back  Strong lower-back and abdominal muscles work together to support your spine. These exercises will help strengthen the muscles of the lower back. It is important that you begin exercising slowly and increase levels gradually.  Always begin any exercise program with stretching. If you feel pain while doing any of these exercises, stop and talk to your doctor about a more specific exercise program that suits your condition better.  Low Back Stretch  · Lie on your back with your knees bent and both feet on the ground.  ·   Slowly raise your left knee to your chest as you flatten your lower back against the floor. Hold for 5 seconds.  · Relax and repeat the exercise with your right knee.  · Do 10 of these exercises for each leg.  · Repeat hugging both knees to your chest at the same time.  Building Lower Back Strength  1. Kneeling Lumbar Extension: Begin on your hands and knees. Simultaneously raise and straighten your right arm and left leg until they are parallel to the ground. Hold for 2 seconds and come back slowly to a starting position. Repeat with left arm and right leg, alternating 10 times.  2. Prone Lumbar Extension: Lie face down, arms extended overhead, palms on the floor. Simultaneously raise your right arm and left leg as high as comfortably possible. Hold for 10 seconds and slowly return to start. Repeat with left arm and right leg, alternating 10 times. Gradually build up to 20 times. (Advanced: Repeat this exercise raising both arms and both legs a few inches off the floor at the same time. Hold for 5 seconds and release.)  3. Pelvic Tilt: Lie on the floor on your back with your knees bent at 90 degrees. Your feet should be flat on the floor. Inhale, exhale, then slowly contract your abdominal muscles bringing your navel toward your spine. Let your pelvis rock back until your lower back is flat on the floor. Hold for 10 seconds while breathing  smoothly.  4. Abdominal Crunch: Perform a Pelvic Tilt (above) flattening your lower back against the floor. Holding the tension in your abdominal muscles, take another breath and raise your shoulder blades off the ground (this is not a full sit-up). Keep your head in line with your body (dont bend your neck forward). Hold for 2 seconds, then slowly lower.      Back Exercises: Abdominal Lift  The Abdominal Lift strengthens your lower abdominal muscles, helping you keep your pelvis and back stable.  · Lie on the floor with both knees bent. Put your feet flat on the floor and your arms by your sides. Tighten your abdominal muscles.  · Lift one bent knee and move it toward your upper body. Keep your abdominal muscles tight and your back flat on the floor. Hold for 10 seconds.  · Repeat 3 times. Then, switch legs.         Back Exercises: Bridge  The Bridge exercise strengthens your abdominal, buttocks, and hamstring muscles. This helps keep your back stable and aligned when you walk.  · Lie on the floor with your back and palms flat. Bend your knees. Keep your feet flat on the floor.  · Contract your abdominal and buttocks muscles. Slowly lift your buttocks off the floor until there is a straight line from your knees to your shoulders.  · Hold for 5  seconds. Repeat 10 times.          Back Exercises: Hip Lift        To start, lie on your back with your knees bent and feet flat on the floor. Dont press your neck or lower back to the floor. Breathe deeply. You should feel comfortable and relaxed in this position.  · Slowly raise your hips upward.  · Tighten your abdomen and buttocks. Be careful not to arch your back.  · Hold for 5 seconds. Lower your hips to the floor.  · Repeat 10 times.  For your safety, check with your healthcare provider before starting an exercise program.       Back Exercises: Hip Rotator Stretch        To start, lie on your back with your knees bent and feet flat on the floor. Dont press your  neck or lower back to the floor. Breathe deeply. You should feel comfortable and relaxed in this position.  · Rest your right ankle on your left knee.  · Place a towel behind your left thigh and use it to pull the knee toward your chest. Feel the stretch in your buttocks.  · Hold for 30-60 seconds. Release.  · Repeat 2 times.  · Switch legs.   For your safety, check with your healthcare provider before starting an exercise program.       Back Exercises: Knee Lift        To start, lie on your back with your knees bent and feet flat on the floor. Dont press your neck or lower back to the floor. Breathe deeply. You should feel comfortable and relaxed in this position.  · Lift one bent knee and move it toward your upper body. Keep your abdominal muscles tight and your back flat on the floor.  · Hold for 10 seconds. Return to start position.  · Repeat 3 times.  · Switch legs.        Back Exercises: Leg Pull        To start, lie on your back with your knees bent and feet flat on the floor. Dont press your neck or lower back to the floor. Breathe deeply. You should feel comfortable and relaxed in this position.  · Pull one knee to your chest.  · Hold for 30-60 seconds. Return to starting position.  · Repeat 2 times.  · Switch legs.  · For a double leg pull, pull both legs to your chest at the same time. Repeat 2 times.  For your safety, check with your healthcare provider before starting an exercise program.       Back Exercises: Leg Reach        Do this exercise on your hands and knees. Keep your knees under your hips and your hands under your shoulders. Keep your spine in a neutral position (not arched or sagging). Be sure to maintain your necks natural curve.  · Extend one leg straight back. Dont arch your back or let your head or body sag.  · Hold for 5 seconds. Return to starting position.  · Repeat 5 times.  · Switch legs.       Back Exercises: Lower Back Rotation  To start, lie on your back with your knees bent  and feet flat on the floor. Dont press your neck or lower back to the floor. Breathe deeply. You should feel comfortable and relaxed in this position.  · Drop both knees to one side. Turn your head to the other side. Keep your shoulders flat on the floor.  · Hold for 20 seconds.  · Slowly switch sides.  · Repeat 2 times.                                   Back Exercises: Lower Back Stretch                        To start, sit in a chair with your feet flat on the floor. Shift your weight slightly forward to avoid rounding your back. Relax, and keep your ears, shoulders, and hips aligned.  · Sit with your feet well apart.  · Bend forward and touch the floor with the backs of your hands. Relax and let your body drop.  · Hold for 20 seconds. Return to starting position.  · Repeat 2 times.       Back Exercises: Partial Curl-Ups        To start, lie on your back with your knees bent and feet flat on the floor. Dont press your neck or lower back to the floor. Breathe deeply. You should feel comfortable and relaxed in this position.  · Cross your arms loosely.  · Tighten your abdomen and curl snf up, keeping your head in line with your shoulders.  · Hold for 5 seconds. Uncurl to lie down.  · Repeat 5 times.       Back Exercises: Pelvic Tilt  To start, lie on your back with your knees bent and feet flat on the floor. Dont press your neck or lower back to the floor. Breathe deeply. You should feel comfortable and relaxed in this position.  · Tighten your abdomen and buttocks, and press your lower back toward the floor. This should be a small, subtle movement.  · Hold for 5 seconds. Release.  · Repeat 5 times.                           Back Exercises: Seated Rotation      To start, sit in a chair with your feet flat on the floor. Shift your weight slightly forward to avoid rounding your back. Relax, and keep your ears, shoulders, and hips aligned.  · Fold your arms, elbows just below shoulder height.  · Turn from the  waist with hips forward. Turn your head last.  · Hold for a count of 5. Return to starting position.  · Repeat 5 times on one side. Then switch sides.          Back Exercises: Side Stretch  To start, sit in a chair with your feet flat on the floor. Shift your weight slightly forward to avoid rounding your back. Relax. Keep your ears, shoulders, and hips aligned.  · Stretch your right arm overhead.  · Slowly bend to the left. Dont twist your torso.  · Hold for 20 seconds. Return to starting position.  · Repeat 2 times. Then, switch to the other side.      © 4549-0154 [employer]. [address] [place] [address], [place] [address]. All rights reserved. This information is not intended as a substitute for professional medical care. Always follow your healthcare professional's instructions.

## 2019-04-10 NOTE — PROGRESS NOTES
Referring Physician: No ref. provider found    PCP: Jan Olivo MD    CC:  Lower back pain    Interval History:  Mrs. Dela Cruz is a 74 y.o. female is a 74 y.o. female with  Chronic low back pain who presents today for f/u s/p lumbar MB RFA at L3, 4, 5 bilaterally. Reports minimal improvement in low back pain. Pain is a constant aching, burning pain in her lower back.  Pain radiates to her bilateral hips.  She had moderate benefit from lumbar MB RFA procedure performed in September 2018. Neck pain continues to be tolerable following cervical CHRISTOPHER as well as cervical MB RFA procedures.  She denies any worsening weakness.  No bowel bladder changes. She does not have a home exercises plan. Pain today is rated 6/10.    Prior HPI:   Ayla Dela Cruz is a 74 y.o. female who presents as a new patient from Dr. Grubbs for 2.5 month history of R shoulder/mid back pain. The pain first began when she lifted some books at a bookstore in early November. She began feeling the pain in her R lateral neck/suprascapular region, with radiation down the back/side of her arm to the top of the elbow. She was treated with some topical creams, chiropractor adjustments, and then was placed on gabapentin by Dr. Grubbs, which has helped her pain a considerable amount. She states that the pain is intermittent, aching, sharp, shooting & radiates to her elbow. Worsened by sitting, or lying in bed. 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     Interventional history:  Cervical epidural steroid injection on 01/2018 and 04/2018 with moderate benefit of her neck and right shoulder pain.  - Cervical right C 4-6 on 07/2018 with 60% relief of her right-sided neck pain.  - lumbar MB RFA on 09/2018 with 60% relief of her lower back pain    ROS:  CONSTITUTIONAL: No fevers, chills, night sweats, wt. loss, appetite changes  SKIN: no rashes or itching  ENT: No headaches, head trauma, vision changes, or eye pain  LYMPH NODES: None noticed   CV: No  chest pain, palpitations.   RESP: No shortness of breath, dyspnea on exertion, cough, wheezing, or hemoptysis  GI: No nausea, emesis, diarrhea, constipation, melena, hematochezia, pain.    : No dysuria, hematuria, urgency, or frequency   HEME: No easy bruising, bleeding problems  PSYCHIATRIC: No anxiety, psychosis, hallucinations. +ve depression  NEURO: No seizures, memory loss, dizziness, +ve difficulty sleeping  MSK: +HPI      Past Medical History:   Diagnosis Date    Anticoagulant long-term use     Anxiety     Arthritis     Atrial fibrillation     Cancer     skin    CHF (congestive heart failure)     Depression     DVT (deep venous thrombosis)     GERD (gastroesophageal reflux disease)     Glaucoma (increased eye pressure)     Hyperlipidemia     diet controlled    Hypertension     Interstitial lung disease     Mild persistent asthma without complication 11/12/2018    Pneumonia 1/31/2014    Stroke 6-3-14    Stroke     TIA (transient ischemic attack)     TIA (transient ischemic attack)      Past Surgical History:   Procedure Laterality Date    2 heart ablations      3 in total    ABLATION N/A 9/7/2017    Per[facility] at [facility] CATH LAB    bilateral cataracts      BLOCK, NERVE, FACET JOINT, MEDIAL BRANCH, CERVICAL Right 6/7/2018    [facility] by [facility]ancy [facility] at [facility]    BLOCK-NERVE-MEDIAL BRANCH-LUMBAR Bilateral 3/6/2018    [facility] at [facility]    BRONCHOSCOPY N/A 5/1/2014    Performed by [facility]ardo [facility] at [facility] ENDO    CHOLECYSTECTOMY      CHOLECYSTECTOMY- open N/A 8/31/2016    [facility] by [facility] at [facility] OR    COLONOSCOPY and EGD N/A 1/19/2017    Performed by [facility] Schultz [facility] at [facility] ENDO    ESOPHAGOGASTRODUODENOSCOPY (EGD) N/A 1/19/2017    Performed by [facility] at [facility] END[facility]    INJECTION-STEROID-EPIDURAL-CERVICAL N/A 4/10/2018    [facility] at [facility]    INJECTION-STEROID-EPIDURAL-CERVICAL N/A  1/29/2018    Performed by [facility] at [facility] OR    ORIF, ANKLE Right 11/1/2018    [facility] at [facility]    pyloristenosis      Radiofrequency Ablation, Nerve, Spinal, Lumbar, Medial Branch, 1 Level Bilateral 2/19/2019    Performed by [facility] Cl[facility] at [facility] OR    RADIOFREQUENCY ABLATION, NERVE, SPINAL, LUMBAR, MEDIAL BRANCH, 1 LEVEL Bilateral 9/21/2018    Performed by [facility] at [facility] OR    RADIOFREQUENCY THERMAL COAGULATION, NERVE, SPINAL, CERVICAL, MEDIAL BRANCH OF POSTERIOR RAMUS Right 7/3/2018    Performed by [facility] Clancy [facility] at [facility] OR    RADIOFREQUENCY THERMOCOAGULATION (RFTC)-NERVE-MEDIAN BRANCH-LUMBAR Bilateral 3/12/2018    Performed by [facility] at [facility] OR    skin cancer removal       TONSILLECTOMY      TRANSESOPHAGEAL ECHOCARDIOGRAM (FELICIA) N/A 9/7/2017    [facility] at [facility] CATH LAB     Family History   Problem Relation Age of Onset    Heart disease Father     Ulcers Father     Arthritis Mother     Asthma Mother     Rheum arthritis Mother     Pneumonia Mother     Depression Son     Alzheimer's disease Maternal Uncle     Rheum arthritis Maternal Grandmother     Emphysema Maternal Grandfather     Cancer Maternal Grandfather         kidney    Kidney disease Maternal Grandfather     Cancer Paternal Grandmother         lung= smoker    Pneumonia Paternal Grandfather     Breast cancer Neg Hx     Ovarian cancer Neg Hx      Social History     Socioeconomic History    Marital status:      Spouse name: Not on file    Number of children: Not on file    Years of education: Not on file    Highest education level: Not on file   Occupational History    Not on file   Social Needs    Financial resource strain: Not on file    Food insecurity:     Worry: Not on file     Inability: Not on file    Transportation needs:     Medical: Not on file     Non-medical: Not on file   Tobacco Use    Smoking status: Never Smoker    Smokeless  "tobacco: Never Used   Substance and Sexual Activity    Alcohol use: No    Drug use: No    Sexual activity: Yes     Partners: Male   Lifestyle    Physical activity:     Days per week: Not on file     Minutes per session: Not on file    Stress: Not on file   Relationships    Social connections:     Talks on phone: Not on file     Gets together: Not on file     Attends Alevism service: Not on file     Active member of club or organization: Not on file     Attends meetings of clubs or organizations: Not on file     Relationship status: Not on file   Other Topics Concern    Not on file   Social History Narrative    Not on file         Medications/Allergies: See med card    Vitals:    04/10/19 0910   BP: 131/70   Pulse: 68   Weight: 60.8 kg (134 lb)   Height: 5' 7" (1.702 m)   PainSc:   6   PainLoc: Back       Physical exam:    GENERAL: A and O x3, the patient appears well groomed and is in no acute distress.  Skin: No rashes or obvious lesions  HEENT: normocephalic, atraumatic  CARDIOVASCULAR:  RRR  LUNGS: non labored breathing  ABDOMEN: soft, nontender   UPPER EXTREMITIES: Normal alignment, normal range of motion, no atrophy, no skin changes,  hair growth and nail growth normal and equal bilaterally. No swelling. Moderate tenderness to AC joint on the right. Pain with flexion at 90 degrees.   LOWER EXTREMITIES:  Normal alignment, normal range of motion, no atrophy, no skin changes,  hair growth and nail growth normal and equal bilaterally. No swelling, no tenderness.  CERVICAL SPINE:  Cervical spine: ROM is full in flexion, extension and lateral rotation with increased pain on the right side with lateral rotation and extension.   Spurling's maneuver causes no neck pain to either side.  Myofascial exam: tender to palpation along suprascapular muscle and anterior pressure of shoulder.  MENTAL STATUS: normal orientation, speech, language, and fund of knowledge for social situation.  Emotional state appropriate.   " LUMBAR SPINE:  Full ROM with pain on flexion  Negative SLR bilaterally  TTP lumbar paraspinous muscles bilaterally.     CRANIAL NERVES:  II:  PERRL bilaterally,   III,IV,VI: EOMI.    V:  Facial sensation equal bilaterally  VII:  Facial motor function normal.  VIII:  Hearing equal to finger rub bilaterally  IX/X: Gag normal, palate symmetric  XI:  Shoulder shrug equal, head turn equal  XII:  Tongue midline without fasciculations      MOTOR: Tone and bulk: normal bilateral upper and lower Strength: normal   Delt Bi Tri WE WF     R 5 5 5 5 5 5   L 5 5 5 5 5 5     IP ADD ABD Quad TA Gas HAM  R 5 5 5 5 5 5 5  L 5 5 5 5 5 5 5    SENSATION: Light touch and pinprick intact bilaterally  REFLEXES: normal, symmetric, nonbrisk.  Toes down, no clonus. No hoffmans.  GAIT: normal rise, base, steps, and arm swing.        Imaging:  MRI C-spine 12/2017  There is a 2 mm retrolisthesis of C4 on C5 and C6 on C7.  There is reversal of the normal cervical lordosis which could relate to neck muscle spasm.The cervical vertebral bodies show normal signal intensity and height with no indication of acute fracture or pathologic marrow replacement process.    There is degenerative disc desiccation at every cervical level with marginal osteophyte formation and disc space narrowing noted at C3-C4, C4-C5, C5-C6, and to a lesser extent, C6-C7.  There is congenital spinal canal narrowing present.    The cervical cord is normal in signal intensity at all levels with no indication of myelomalacia or cord edema. The incidentally observed soft tissues of the neck show no significant abnormalities.    C2-C3: There is bilateral hypertrophic facet arthropathy.  There is left ligamentum flavum thickening.  No definite acquired central canal or neuroforaminal stenosis.    C3-C4: There is a posterior disc osteophyte complex with a superimposed broad central disc protrusion which flattens the ventral cord.  There is ligamentum flavum thickening, bilateral  uncovertebral spurring, and bilateral facet arthropathy, left greater than right.  There is moderate overall central canal stenosis, moderate-severe left neuroforaminal stenosis, and moderate right neuroforaminal stenosis.    C4-C5: There is a bulging posterior disc osteophyte complex, ligamentum flavum thickening, bilateral uncovertebral spurring, and facet arthropathy, right greater than left.  There is severe bilateral neural foraminal stenosis and moderate overall central canal stenosis.  No abnormal cord signal.  There is flattening of the ventral cord.    C5-C6: There is a posterior disc osteophyte complex and bilateral uncovertebral spurring, right greater than left.  No left neuroforaminal stenosis.  There is mild-moderate right neuroforaminal stenosis and mild-moderate overall central canal stenosis.    C6-C7: There is a 2 mm retrolisthesis of C6 on C7 with uncovering of the intervertebral disc and a superimposed posterior disc osteophyte complex.  There is bilateral uncovertebral spurring, left greater than right.  There is severe left and moderate right neuroforaminal stenosis.  There is ligamentum flavum thickening.  There is moderate central canal stenosis with flattening of the ventral cord.    C7-T1: No central canal or neuroforaminal stenosis. No disc protrusion or extrusion.    Assessment:  Mrs. Dela Cruz is a 72 y.o. female with     1. Other spondylosis, lumbar region    2. Other spondylosis, cervical region    3. Myalgia    4. Chronic midline low back pain without sciatica      Plan:  1. Recommend physical therapy and home exercise regimen to improve strength . Home exercises provided  2. Continue Gabapentin 300 mg TID   3. Monitor progress and consider repeat bilateral lumbar MB RFA procedure to help with her lower back pain.  4  Patient with significant benefit following Cervical CHRISTOPHER.  Patient will continue to monitor her progress.  May consider repeat procedure in future if pain returns or  worsens.   5. Follow up PRN

## 2019-04-25 DIAGNOSIS — J45.31 MILD PERSISTENT ASTHMA WITH ACUTE EXACERBATION: ICD-10-CM

## 2019-04-25 RX ORDER — PREDNISONE 5 MG/1
10 TABLET ORAL 2 TIMES DAILY
Qty: 36 TABLET | Refills: 1 | Status: SHIPPED | OUTPATIENT
Start: 2019-04-25 | End: 2019-07-23 | Stop reason: SDUPTHER

## 2019-04-25 RX ORDER — GABAPENTIN 300 MG/1
CAPSULE ORAL
Qty: 30 CAPSULE | Refills: 0 | Status: SHIPPED | OUTPATIENT
Start: 2019-04-25 | End: 2019-06-14 | Stop reason: SDUPTHER

## 2019-04-25 NOTE — TELEPHONE ENCOUNTER
----- Message from Roby Christopher sent at 4/25/2019 10:34 AM CDT -----  Contact: pt  Type:  RX Refill Request    Who Called:  pt  Refill or New Rx:  refill  RX Name and Strength:  predniSONE (DELTASONE) 5 MG tablet  How is the patient currently taking it? (ex. 1XDay):  As needed  Is this a 30 day or 90 day RX:  90  Preferred Pharmacy with phone number:    [facility]/pharmacy #[id number] - [place] - [address]  [address] [address]  Phone: [phone] Fax: [phone]    Local or Mail Order:    Ordering Provider:    Best Call Back Number:  [phone]  Additional Information:  Pt has 3 left and will be leaving [facility] Sunday and will be back 5.10.19

## 2019-04-26 RX ORDER — RIVAROXABAN 20 MG/1
TABLET, FILM COATED ORAL
Qty: 30 TABLET | Refills: 10 | Status: SHIPPED | OUTPATIENT
Start: 2019-04-26 | End: 2019-09-03 | Stop reason: ALTCHOICE

## 2019-05-13 ENCOUNTER — LAB VISIT (OUTPATIENT)
Dept: LAB | Facility: HOSPITAL | Age: 74
End: 2019-05-13
Attending: PHYSICIAN ASSISTANT
Payer: MEDICARE

## 2019-05-13 DIAGNOSIS — D64.9 ANEMIA, UNSPECIFIED TYPE: ICD-10-CM

## 2019-05-13 LAB
BASOPHILS # BLD AUTO: 0.04 K/UL (ref 0–0.2)
BASOPHILS NFR BLD: 0.3 % (ref 0–1.9)
DIFFERENTIAL METHOD: ABNORMAL
EOSINOPHIL # BLD AUTO: 0.1 K/UL (ref 0–0.5)
EOSINOPHIL NFR BLD: 0.5 % (ref 0–8)
ERYTHROCYTE [DISTWIDTH] IN BLOOD BY AUTOMATED COUNT: 16 % (ref 11.5–14.5)
HCT VFR BLD AUTO: 39.7 % (ref 37–48.5)
HGB BLD-MCNC: 12.2 G/DL (ref 12–16)
IMM GRANULOCYTES # BLD AUTO: 0.06 K/UL (ref 0–0.04)
IMM GRANULOCYTES NFR BLD AUTO: 0.5 % (ref 0–0.5)
LYMPHOCYTES # BLD AUTO: 0.9 K/UL (ref 1–4.8)
LYMPHOCYTES NFR BLD: 7 % (ref 18–48)
MCH RBC QN AUTO: 30.3 PG (ref 27–31)
MCHC RBC AUTO-ENTMCNC: 30.7 G/DL (ref 32–36)
MCV RBC AUTO: 99 FL (ref 82–98)
MONOCYTES # BLD AUTO: 1.3 K/UL (ref 0.3–1)
MONOCYTES NFR BLD: 9.7 % (ref 4–15)
NEUTROPHILS # BLD AUTO: 10.8 K/UL (ref 1.8–7.7)
NEUTROPHILS NFR BLD: 82 % (ref 38–73)
NRBC BLD-RTO: 0 /100 WBC
PLATELET # BLD AUTO: 286 K/UL (ref 150–350)
PMV BLD AUTO: 10 FL (ref 9.2–12.9)
RBC # BLD AUTO: 4.02 M/UL (ref 4–5.4)
WBC # BLD AUTO: 13.15 K/UL (ref 3.9–12.7)

## 2019-05-13 PROCEDURE — 36415 COLL VENOUS BLD VENIPUNCTURE: CPT | Mod: PO

## 2019-05-13 PROCEDURE — 85025 COMPLETE CBC W/AUTO DIFF WBC: CPT

## 2019-05-22 ENCOUNTER — PATIENT MESSAGE (OUTPATIENT)
Dept: ORTHOPEDICS | Facility: CLINIC | Age: 74
End: 2019-05-22

## 2019-05-23 RX ORDER — CLINDAMYCIN HYDROCHLORIDE 300 MG/1
300 CAPSULE ORAL 3 TIMES DAILY
Qty: 15 CAPSULE | Refills: 0 | Status: ON HOLD | OUTPATIENT
Start: 2019-05-23 | End: 2019-09-10

## 2019-05-24 RX ORDER — AMOXICILLIN 500 MG/1
500 TABLET, FILM COATED ORAL EVERY 12 HOURS
Qty: 10 TABLET | Refills: 0 | Status: SHIPPED | OUTPATIENT
Start: 2019-05-24 | End: 2019-05-29

## 2019-05-24 NOTE — TELEPHONE ENCOUNTER
----- Message from Ivon Diaz sent at 5/24/2019  1:11 PM CDT -----  Type: Needs Medical Advice    Who Called:  self  Symptoms (please be specific):  Pt states she is having stomach issues (diarrhea)  with clindamycin (CLEOCIN) 300 MG capsule   How long has patient had these symptoms: she is requesting amoxicillin is the only that she can take   Pharmacy name and phone #:    [facility]/pharmacy #[id number] - [place] - [address]  [address] [address]  Phone: [phone] Fax: [phone]    Best Call Back Number: [phone]    Additional Information:

## 2019-06-14 RX ORDER — GABAPENTIN 300 MG/1
CAPSULE ORAL
Qty: 30 CAPSULE | Refills: 0 | Status: SHIPPED | OUTPATIENT
Start: 2019-06-14 | End: 2019-07-08 | Stop reason: SDUPTHER

## 2019-07-03 ENCOUNTER — PATIENT MESSAGE (OUTPATIENT)
Dept: PAIN MEDICINE | Facility: CLINIC | Age: 74
End: 2019-07-03

## 2019-07-08 RX ORDER — GABAPENTIN 300 MG/1
CAPSULE ORAL
Qty: 30 CAPSULE | Refills: 0 | Status: SHIPPED | OUTPATIENT
Start: 2019-07-08 | End: 2019-08-25 | Stop reason: SDUPTHER

## 2019-07-08 NOTE — TELEPHONE ENCOUNTER
Pt scheduled for 7/23/19 for a cervical RFA. She will need to hold her prescribed xarellto 2 days prior.

## 2019-07-09 DIAGNOSIS — M47.892 OTHER SPONDYLOSIS, CERVICAL REGION: Primary | ICD-10-CM

## 2019-07-17 ENCOUNTER — OFFICE VISIT (OUTPATIENT)
Dept: PAIN MEDICINE | Facility: CLINIC | Age: 74
End: 2019-07-17
Payer: MEDICARE

## 2019-07-17 VITALS — WEIGHT: 134 LBS | BODY MASS INDEX: 21.03 KG/M2 | HEIGHT: 67 IN

## 2019-07-17 DIAGNOSIS — M25.511 RIGHT SHOULDER PAIN, UNSPECIFIED CHRONICITY: ICD-10-CM

## 2019-07-17 DIAGNOSIS — M47.896 OTHER SPONDYLOSIS, LUMBAR REGION: ICD-10-CM

## 2019-07-17 DIAGNOSIS — M47.892 OTHER SPONDYLOSIS, CERVICAL REGION: Primary | ICD-10-CM

## 2019-07-17 PROCEDURE — 99999 PR PBB SHADOW E&M-EST. PATIENT-LVL IV: ICD-10-PCS | Mod: PBBFAC,,, | Performed by: ANESTHESIOLOGY

## 2019-07-17 PROCEDURE — 20610 LARGE JOINT ASPIRATION/INJECTION: R GLENOHUMERAL: ICD-10-PCS | Mod: S$PBB,RT,, | Performed by: ANESTHESIOLOGY

## 2019-07-17 PROCEDURE — 99214 OFFICE O/P EST MOD 30 MIN: CPT | Mod: PBBFAC,PN | Performed by: ANESTHESIOLOGY

## 2019-07-17 PROCEDURE — 20610 DRAIN/INJ JOINT/BURSA W/O US: CPT | Mod: PBBFAC,PN | Performed by: ANESTHESIOLOGY

## 2019-07-17 PROCEDURE — 99213 PR OFFICE/OUTPT VISIT, EST, LEVL III, 20-29 MIN: ICD-10-PCS | Mod: 25,S$PBB,, | Performed by: ANESTHESIOLOGY

## 2019-07-17 PROCEDURE — 99213 OFFICE O/P EST LOW 20 MIN: CPT | Mod: 25,S$PBB,, | Performed by: ANESTHESIOLOGY

## 2019-07-17 PROCEDURE — 99999 PR PBB SHADOW E&M-EST. PATIENT-LVL IV: CPT | Mod: PBBFAC,,, | Performed by: ANESTHESIOLOGY

## 2019-07-17 RX ORDER — METHYLPREDNISOLONE ACETATE 40 MG/ML
40 INJECTION, SUSPENSION INTRA-ARTICULAR; INTRALESIONAL; INTRAMUSCULAR; SOFT TISSUE
Status: DISCONTINUED | OUTPATIENT
Start: 2019-07-17 | End: 2019-07-17 | Stop reason: HOSPADM

## 2019-07-17 RX ADMIN — METHYLPREDNISOLONE ACETATE 40 MG: 40 INJECTION, SUSPENSION INTRA-ARTICULAR; INTRALESIONAL; INTRAMUSCULAR; SOFT TISSUE at 10:07

## 2019-07-17 NOTE — PROGRESS NOTES
Referring Physician: No ref. provider found    PCP: Jan Olivo MD    CC:  Lower back pain    Interval History:  Mrs. Dela Cruz is a 74 y.o. female is a 74 y.o. female with  Chronic low back pain who presents today for f/u s/p lumbar MB RFA at L3, 4, 5 bilaterally. Reports minimal improvement in low back pain. Pain is a constant aching, burning pain in her lower back.  Pain radiates to her bilateral hips.  She had moderate benefit from lumbar MB RFA procedure performed in September 2018. Neck pain continues to be tolerable following cervical CHRISTOPHER as well as cervical MB RFA procedures.  She is scheduled for repeat cervical MB RFA in the near future.  She also has worsening right shoulder pain and has had shoulder injections in the past by outside provider with moderate benefit.  She desires right shoulder injection today.  She denies any worsening weakness.  No bowel bladder changes. She does not have a home exercises plan. Pain today is rated 6/10.    Prior HPI:   Ayla Dela Cruz is a 74 y.o. female who presents as a new patient from Dr. Grubbs for 2.5 month history of R shoulder/mid back pain. The pain first began when she lifted some books at a [employer] in early November. She began feeling the pain in her R lateral neck/suprascapular region, with radiation down the back/side of her arm to the top of the elbow. She was treated with some topical creams, chiropractor adjustments, and then was placed on gabapentin by Dr. Grubbs, which has helped her pain a considerable amount. She states that the pain is intermittent, aching, sharp, shooting & radiates to her elbow. Worsened by sitting, or lying in bed. 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     Interventional history:  Cervical epidural steroid injection on 01/2018 and 04/2018 with moderate benefit of her neck and right shoulder pain.  - Cervical right C 4-6 on 07/2018 with 60% relief of her right-sided neck pain.  - lumbar MB RFA on 09/2018 with 60%  relief of her lower back pain    ROS:  CONSTITUTIONAL: No fevers, chills, night sweats, wt. loss, appetite changes  SKIN: no rashes or itching  ENT: No headaches, head trauma, vision changes, or eye pain  LYMPH NODES: None noticed   CV: No chest pain, palpitations.   RESP: No shortness of breath, dyspnea on exertion, cough, wheezing, or hemoptysis  GI: No nausea, emesis, diarrhea, constipation, melena, hematochezia, pain.    : No dysuria, hematuria, urgency, or frequency   HEME: No easy bruising, bleeding problems  PSYCHIATRIC: No anxiety, psychosis, hallucinations. +ve depression  NEURO: No seizures, memory loss, dizziness, +ve difficulty sleeping  MSK: +HPI      Past Medical History:   Diagnosis Date    Anticoagulant long-term use     Anxiety     Arthritis     Atrial fibrillation     Cancer     skin    CHF (congestive heart failure)     Depression     DVT (deep venous thrombosis)     GERD (gastroesophageal reflux disease)     Glaucoma (increased eye pressure)     Hyperlipidemia     diet controlled    Hypertension     Interstitial lung disease     Mild persistent asthma without complication 11/12/2018    Pneumonia 1/31/2014    Stroke 6-3-14    Stroke     TIA (transient ischemic attack)     TIA (transient ischemic attack)      Past Surgical History:   Procedure Laterality Date    2 heart ablations      3 in total    ABLATION N/A 9/7/2017    [facility] LAB    bilateral cataracts      BLOCK, NERVE, FACET JOINT, MEDIAL BRANCH, CERVICAL Right 6/7/2018    [facility]    BLOCK-NERVE-MEDIAL BRANCH-LUMBAR Bilateral 3/6/2018    [facility] at [facility] OR    BRONCHOSCOPY N/A 5/1/2014    Performed by Jose Eduardo White [facility] at [facility] ENDO    CHOLECYSTECTOMY      CHOLECYSTECTOMY- open N/A 8/31/2016    Performed by Lavon Gonsalez [facility] at [facility] OR    COLONOSCOPY and EGD N/A 1/19/2017    Performed by Jonathan Schultz MD at [facility] ENDO     ESOPHAGOGASTRODUODENOSCOPY (EGD) N/A 1/19/2017    Performed by [facility] at [facility] ENDO    INJECTION-STEROID-EPIDURAL-CERVICAL N/A 4/10/2018    Performed by [facility] at [facility] OR    INJECTION-STEROID-EPIDURAL-CERVICAL N/A 1/29/2018    Performed by [facility] at [facility] OR    ORIF, ANKLE Right 11/1/2018    Performed by Wilfredo Aguero [facility] at [facility] OR    pyloristenosis      Radiofrequency Ablation, Nerve, Spinal, Lumbar, Medial Branch, 1 Level Bilateral 2/19/2019    Performed by Gal[facility] Clancy [facility] at [facility] OR    RADIOFREQUENCY ABLATION, NERVE, SPINAL, LUMBAR, MEDIAL BRANCH, 1 LEVEL Bilateral 9/21/2018    Performed by Galo Clancy MD at [facility]    RADIOFREQUENCY THERMAL COAGULATION, NERVE, SPINAL, CERVICAL, MEDIAL BRANCH OF POSTERIOR RAMUS Right 7/3/2018    Performed by Galo Clancy MD at [facility] OR    RADIOFREQUENCY THERMOCOAGULATION (RFTC)-NERVE-MEDIAN BRANCH-LUMBAR Bilateral 3/12/2018    Performed by [facility] at [facility] OR    skin cancer removal       TONSILLECTOMY      TRANSESOPHAGEAL ECHOCARDIOGRAM (FELICIA) N/A 9/7/2017    Performed by [facility] at [facility] CATH LAB     Family History   Problem Relation Age of Onset    Heart disease Father     Ulcers Father     Arthritis Mother     Asthma Mother     Rheum arthritis Mother     Pneumonia Mother     Depression Son     Alzheimer's disease Maternal Uncle     Rheum arthritis Maternal Grandmother     Emphysema Maternal Grandfather     Cancer Maternal Grandfather         kidney    Kidney disease Maternal Grandfather     Cancer Paternal Grandmother         lung= smoker    Pneumonia Paternal Grandfather     Breast cancer Neg Hx     Ovarian cancer Neg Hx      Social History     Socioeconomic History    Marital status:      Spouse name: Not on file    Number of children: Not on file    Years of education: Not on file    Highest education level: Not on file   Occupational History    Not on file   Social Needs    Financial resource  "strain: Not on file    Food insecurity:     Worry: Not on file     Inability: Not on file    Transportation needs:     Medical: Not on file     Non-medical: Not on file   Tobacco Use    Smoking status: Never Smoker    Smokeless tobacco: Never Used   Substance and Sexual Activity    Alcohol use: No    Drug use: No    Sexual activity: Yes     Partners: Male   Lifestyle    Physical activity:     Days per week: Not on file     Minutes per session: Not on file    Stress: Not on file   Relationships    Social connections:     Talks on phone: Not on file     Gets together: Not on file     Attends [employer] service: Not on file     Active member of club or organization: Not on file     Attends meetings of clubs or organizations: Not on file     Relationship status: Not on file   Other Topics Concern    Not on file   Social History Narrative    Not on file         Medications/Allergies: See med card    Vitals:    07/17/19 1012   Weight: 60.8 kg (134 lb)   Height: 5' 7" (1.702 m)   PainSc:   6   PainLoc: Shoulder       Physical exam:    GENERAL: A and O x3, the patient appears well groomed and is in no acute distress.  Skin: No rashes or obvious lesions  HEENT: normocephalic, atraumatic  CARDIOVASCULAR:  RRR  LUNGS: non labored breathing  ABDOMEN: soft, nontender   UPPER EXTREMITIES: Normal alignment, normal range of motion, no atrophy, no skin changes,  hair growth and nail growth normal and equal bilaterally. No swelling. Moderate tenderness to AC joint on the right. Pain with flexion at 90 degrees.   LOWER EXTREMITIES:  Normal alignment, normal range of motion, no atrophy, no skin changes,  hair growth and nail growth normal and equal bilaterally. No swelling, no tenderness.  CERVICAL SPINE:  Cervical spine: ROM is full in flexion, extension and lateral rotation with increased pain on the right side with lateral rotation and extension.   Spurling's maneuver causes no neck pain to either side.  Myofascial exam: " tender to palpation along suprascapular muscle and anterior pressure of shoulder.  MENTAL STATUS: normal orientation, speech, language, and fund of knowledge for social situation.  Emotional state appropriate.   LUMBAR SPINE:  Full ROM with pain on flexion  Negative SLR bilaterally  TTP lumbar paraspinous muscles bilaterally.     CRANIAL NERVES:  II:  PERRL bilaterally,   III,IV,VI: EOMI.    V:  Facial sensation equal bilaterally  VII:  Facial motor function normal.  VIII:  Hearing equal to finger rub bilaterally  IX/X: Gag normal, palate symmetric  XI:  Shoulder shrug equal, head turn equal  XII:  Tongue midline without fasciculations      MOTOR: Tone and bulk: normal bilateral upper and lower Strength: normal   Delt Bi Tri WE WF     R 5 5 5 5 5 5   L 5 5 5 5 5 5     IP ADD ABD Quad TA Gas HAM  R 5 5 5 5 5 5 5  L 5 5 5 5 5 5 5    SENSATION: Light touch and pinprick intact bilaterally  REFLEXES: normal, symmetric, nonbrisk.  Toes down, no clonus. No hoffmans.  GAIT: normal rise, base, steps, and arm swing.        Imaging:  MRI C-spine 12/2017  There is a 2 mm retrolisthesis of C4 on C5 and C6 on C7.  There is reversal of the normal cervical lordosis which could relate to neck muscle spasm.The cervical vertebral bodies show normal signal intensity and height with no indication of acute fracture or pathologic marrow replacement process.    There is degenerative disc desiccation at every cervical level with marginal osteophyte formation and disc space narrowing noted at C3-C4, C4-C5, C5-C6, and to a lesser extent, C6-C7.  There is congenital spinal canal narrowing present.    The cervical cord is normal in signal intensity at all levels with no indication of myelomalacia or cord edema. The incidentally observed soft tissues of the neck show no significant abnormalities.    C2-C3: There is bilateral hypertrophic facet arthropathy.  There is left ligamentum flavum thickening.  No definite acquired central canal or  neuroforaminal stenosis.    C3-C4: There is a posterior disc osteophyte complex with a superimposed broad central disc protrusion which flattens the ventral cord.  There is ligamentum flavum thickening, bilateral uncovertebral spurring, and bilateral facet arthropathy, left greater than right.  There is moderate overall central canal stenosis, moderate-severe left neuroforaminal stenosis, and moderate right neuroforaminal stenosis.    C4-C5: There is a bulging posterior disc osteophyte complex, ligamentum flavum thickening, bilateral uncovertebral spurring, and facet arthropathy, right greater than left.  There is severe bilateral neural foraminal stenosis and moderate overall central canal stenosis.  No abnormal cord signal.  There is flattening of the ventral cord.    C5-C6: There is a posterior disc osteophyte complex and bilateral uncovertebral spurring, right greater than left.  No left neuroforaminal stenosis.  There is mild-moderate right neuroforaminal stenosis and mild-moderate overall central canal stenosis.    C6-C7: There is a 2 mm retrolisthesis of C6 on C7 with uncovering of the intervertebral disc and a superimposed posterior disc osteophyte complex.  There is bilateral uncovertebral spurring, left greater than right.  There is severe left and moderate right neuroforaminal stenosis.  There is ligamentum flavum thickening.  There is moderate central canal stenosis with flattening of the ventral cord.    C7-T1: No central canal or neuroforaminal stenosis. No disc protrusion or extrusion.    Assessment:  Mrs. Dela Cruz is a 72 y.o. female with     1. Other spondylosis, cervical region    2. Other spondylosis, lumbar region    3. Right shoulder pain, unspecified chronicity      Plan:  1. Recommend physical therapy and home exercise regimen to improve strength . Home exercises provided  2. Continue Gabapentin 300 mg TID   3. Monitor progress and consider repeat bilateral lumbar MB RFA procedure to help  with her lower back pain.  4  schedule repeat cervical MB RFA procedure.  5.  Performed a right shoulder injection today to help l help her right shoulder pain  6.  Follow-up after the procedure

## 2019-07-17 NOTE — H&P (VIEW-ONLY)
Referring Physician: No ref. provider found    PCP: Jan Olivo MD    CC:  Lower back pain    Interval History:  Mrs. Dela Cruz is a 74 y.o. female is a 74 y.o. female with  Chronic low back pain who presents today for f/u s/p lumbar MB RFA at L3, 4, 5 bilaterally. Reports minimal improvement in low back pain. Pain is a constant aching, burning pain in her lower back.  Pain radiates to her bilateral hips.  She had moderate benefit from lumbar MB RFA procedure performed in September 2018. Neck pain continues to be tolerable following cervical CHRISTOPHER as well as cervical MB RFA procedures.  She is scheduled for repeat cervical MB RFA in the near future.  She also has worsening right shoulder pain and has had shoulder injections in the past by outside provider with moderate benefit.  She desires right shoulder injection today.  She denies any worsening weakness.  No bowel bladder changes. She does not have a home exercises plan. Pain today is rated 6/10.    Prior HPI:   Ayla Dela Cruz is a 74 y.o. female who presents as a new patient from Dr. Grubbs for 2.5 month history of R shoulder/mid back pain. The pain first began when she lifted some books at a [employer] in early November. She began feeling the pain in her R lateral neck/suprascapular region, with radiation down the back/side of her arm to the top of the elbow. She was treated with some topical creams, chiropractor adjustments, and then was placed on gabapentin by Dr. Grubbs, which has helped her pain a considerable amount. She states that the pain is intermittent, aching, sharp, shooting & radiates to her elbow. Worsened by sitting, or lying in bed. 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     Interventional history:  Cervical epidural steroid injection on 01/2018 and 04/2018 with moderate benefit of her neck and right shoulder pain.  - Cervical right C 4-6 on 07/2018 with 60% relief of her right-sided neck pain.  - lumbar MB RFA on 09/2018 with 60%  relief of her lower back pain    ROS:  CONSTITUTIONAL: No fevers, chills, night sweats, wt. loss, appetite changes  SKIN: no rashes or itching  ENT: No headaches, head trauma, vision changes, or eye pain  LYMPH NODES: None noticed   CV: No chest pain, palpitations.   RESP: No shortness of breath, dyspnea on exertion, cough, wheezing, or hemoptysis  GI: No nausea, emesis, diarrhea, constipation, melena, hematochezia, pain.    : No dysuria, hematuria, urgency, or frequency   HEME: No easy bruising, bleeding problems  PSYCHIATRIC: No anxiety, psychosis, hallucinations. +ve depression  NEURO: No seizures, memory loss, dizziness, +ve difficulty sleeping  MSK: +HPI      Past Medical History:   Diagnosis Date    Anticoagulant long-term use     Anxiety     Arthritis     Atrial fibrillation     Cancer     skin    CHF (congestive heart failure)     Depression     DVT (deep venous thrombosis)     GERD (gastroesophageal reflux disease)     Glaucoma (increased eye pressure)     Hyperlipidemia     diet controlled    Hypertension     Interstitial lung disease     Mild persistent asthma without complication 11/12/2018    Pneumonia 1/31/2014    Stroke 6-3-14    Stroke     TIA (transient ischemic attack)     TIA (transient ischemic attack)      Past Surgical History:   Procedure Laterality Date    2 heart ablations      3 in total    ABLATION N/A 9/7/2017    Performed by [facility]on [facility] at [facility] CATH LAB    bilateral cataracts      BLOCK, NERVE, FACET JOINT, MEDIAL BRANCH, CERVICAL Right 6/7/2018    Per[facility] by [facility] at [facility] OR    BLOCK-NERVE-MEDIAL BRANCH-LUMBAR Bilateral 3/6/2018    [facility] at [facility]    BRONCHOSCOPY N/A 5/1/2014    Performed by Jose Eduardo White MD at [facility] ENDO    CHOLECYSTECTOMY      CHOLECYSTECTOMY- open N/A 8/31/2016    Performed by Lavon Gonsalez MD at [facility] OR    COLONOSCOPY and EGD N/A 1/19/2017    Performed by Jonathan Schultz MD at [facility] ENDO     ESOPHAGOGASTRODUODENOSCOPY (EGD) N/A 1/19/2017    Performed by [facility]ultz [facility] at [facility] ENDO    INJECTION-STEROID-EPIDURAL-CERVICAL N/A 4/10/2018    [facility] at [facility] OR    INJECTION-STEROID-EPIDURAL-CERVICAL N/A 1/29/2018    [facility] at [facility] OR    ORIF, ANKLE Right 11/1/2018    Performed by Wilfredo Aguero MD at [facility]    pyloristenosis      Radiofrequency Ablation, Nerve, Spinal, Lumbar, Medial Branch, 1 Level Bilateral 2/19/2019    [facility] at [facility] OR    RADIOFREQUENCY ABLATION, NERVE, SPINAL, LUMBAR, MEDIAL BRANCH, 1 LEVEL Bilateral 9/21/2018    [facility] at [facility] OR    RADIOFREQUENCY THERMAL COAGULATION, NERVE, SPINAL, CERVICAL, MEDIAL BRANCH OF POSTERIOR RAMUS Right 7/3/2018    [facility] OR    RADIOFREQUENCY THERMOCOAGULATION (RFTC)-NERVE-MEDIAN BRANCH-LUMBAR Bilateral 3/12/2018    [facility] OR    skin cancer removal       TONSILLECTOMY      TRANSESOPHAGEAL ECHOCARDIOGRAM (FELICIA) N/A 9/7/2017    Performed by [facility] at [facility] CATH LAB     Family History   Problem Relation Age of Onset    Heart disease Father     Ulcers Father     Arthritis Mother     Asthma Mother     Rheum arthritis Mother     Pneumonia Mother     Depression Son     Alzheimer's disease Maternal Uncle     Rheum arthritis Maternal Grandmother     Emphysema Maternal Grandfather     Cancer Maternal Grandfather         kidney    Kidney disease Maternal Grandfather     Cancer Paternal Grandmother         lung= smoker    Pneumonia Paternal Grandfather     Breast cancer Neg Hx     Ovarian cancer Neg Hx      Social History     Socioeconomic History    Marital status:      Spouse name: Not on file    Number of children: Not on file    Years of education: Not on file    Highest education level: Not on file   Occupational History    Not on file   Social Needs    Financial resource  "strain: Not on file    Food insecurity:     Worry: Not on file     Inability: Not on file    Transportation needs:     Medical: Not on file     Non-medical: Not on file   Tobacco Use    Smoking status: Never Smoker    Smokeless tobacco: Never Used   Substance and Sexual Activity    Alcohol use: No    Drug use: No    Sexual activity: Yes     Partners: Male   Lifestyle    Physical activity:     Days per week: Not on file     Minutes per session: Not on file    Stress: Not on file   Relationships    Social connections:     Talks on phone: Not on file     Gets together: Not on file     Attends Rastafari service: Not on file     Active member of club or organization: Not on file     Attends meetings of clubs or organizations: Not on file     Relationship status: Not on file   Other Topics Concern    Not on file   Social History Narrative    Not on file         Medications/Allergies: See med card    Vitals:    07/17/19 1012   Weight: 60.8 kg (134 lb)   Height: 5' 7" (1.702 m)   PainSc:   6   PainLoc: Shoulder       Physical exam:    GENERAL: A and O x3, the patient appears well groomed and is in no acute distress.  Skin: No rashes or obvious lesions  HEENT: normocephalic, atraumatic  CARDIOVASCULAR:  RRR  LUNGS: non labored breathing  ABDOMEN: soft, nontender   UPPER EXTREMITIES: Normal alignment, normal range of motion, no atrophy, no skin changes,  hair growth and nail growth normal and equal bilaterally. No swelling. Moderate tenderness to AC joint on the right. Pain with flexion at 90 degrees.   LOWER EXTREMITIES:  Normal alignment, normal range of motion, no atrophy, no skin changes,  hair growth and nail growth normal and equal bilaterally. No swelling, no tenderness.  CERVICAL SPINE:  Cervical spine: ROM is full in flexion, extension and lateral rotation with increased pain on the right side with lateral rotation and extension.   Spurling's maneuver causes no neck pain to either side.  Myofascial exam: " tender to palpation along suprascapular muscle and anterior pressure of shoulder.  MENTAL STATUS: normal orientation, speech, language, and fund of knowledge for social situation.  Emotional state appropriate.   LUMBAR SPINE:  Full ROM with pain on flexion  Negative SLR bilaterally  TTP lumbar paraspinous muscles bilaterally.     CRANIAL NERVES:  II:  PERRL bilaterally,   III,IV,VI: EOMI.    V:  Facial sensation equal bilaterally  VII:  Facial motor function normal.  VIII:  Hearing equal to finger rub bilaterally  IX/X: Gag normal, palate symmetric  XI:  Shoulder shrug equal, head turn equal  XII:  Tongue midline without fasciculations      MOTOR: Tone and bulk: normal bilateral upper and lower Strength: normal   Delt Bi Tri WE WF     R 5 5 5 5 5 5   L 5 5 5 5 5 5     IP ADD ABD Quad TA Gas HAM  R 5 5 5 5 5 5 5  L 5 5 5 5 5 5 5    SENSATION: Light touch and pinprick intact bilaterally  REFLEXES: normal, symmetric, nonbrisk.  Toes down, no clonus. No hoffmans.  GAIT: normal rise, base, steps, and arm swing.        Imaging:  MRI C-spine 12/2017  There is a 2 mm retrolisthesis of C4 on C5 and C6 on C7.  There is reversal of the normal cervical lordosis which could relate to neck muscle spasm.The cervical vertebral bodies show normal signal intensity and height with no indication of acute fracture or pathologic marrow replacement process.    There is degenerative disc desiccation at every cervical level with marginal osteophyte formation and disc space narrowing noted at C3-C4, C4-C5, C5-C6, and to a lesser extent, C6-C7.  There is congenital spinal canal narrowing present.    The cervical cord is normal in signal intensity at all levels with no indication of myelomalacia or cord edema. The incidentally observed soft tissues of the neck show no significant abnormalities.    C2-C3: There is bilateral hypertrophic facet arthropathy.  There is left ligamentum flavum thickening.  No definite acquired central canal or  neuroforaminal stenosis.    C3-C4: There is a posterior disc osteophyte complex with a superimposed broad central disc protrusion which flattens the ventral cord.  There is ligamentum flavum thickening, bilateral uncovertebral spurring, and bilateral facet arthropathy, left greater than right.  There is moderate overall central canal stenosis, moderate-severe left neuroforaminal stenosis, and moderate right neuroforaminal stenosis.    C4-C5: There is a bulging posterior disc osteophyte complex, ligamentum flavum thickening, bilateral uncovertebral spurring, and facet arthropathy, right greater than left.  There is severe bilateral neural foraminal stenosis and moderate overall central canal stenosis.  No abnormal cord signal.  There is flattening of the ventral cord.    C5-C6: There is a posterior disc osteophyte complex and bilateral uncovertebral spurring, right greater than left.  No left neuroforaminal stenosis.  There is mild-moderate right neuroforaminal stenosis and mild-moderate overall central canal stenosis.    C6-C7: There is a 2 mm retrolisthesis of C6 on C7 with uncovering of the intervertebral disc and a superimposed posterior disc osteophyte complex.  There is bilateral uncovertebral spurring, left greater than right.  There is severe left and moderate right neuroforaminal stenosis.  There is ligamentum flavum thickening.  There is moderate central canal stenosis with flattening of the ventral cord.    C7-T1: No central canal or neuroforaminal stenosis. No disc protrusion or extrusion.    Assessment:  Mrs. Dela Cruz is a 72 y.o. female with     1. Other spondylosis, cervical region    2. Other spondylosis, lumbar region    3. Right shoulder pain, unspecified chronicity      Plan:  1. Recommend physical therapy and home exercise regimen to improve strength . Home exercises provided  2. Continue Gabapentin 300 mg TID   3. Monitor progress and consider repeat bilateral lumbar MB RFA procedure to help  with her lower back pain.  4  schedule repeat cervical MB RFA procedure.  5.  Performed a right shoulder injection today to help l help her right shoulder pain  6.  Follow-up after the procedure

## 2019-07-17 NOTE — PROCEDURES
Large Joint Aspiration/Injection: R glenohumeral  Date/Time: 7/17/2019 10:32 AM  Performed by: Galo Clancy MD  Authorized by: Galo Clancy MD     Consent Done?:  Yes (Written)  Indications:  Pain  Procedure site marked: Yes    Timeout: Prior to procedure the correct patient, procedure, and site was verified      Location:  Shoulder  Site:  R glenohumeral  Prep: Patient was prepped and draped in usual sterile fashion    Needle size:  25 G  Approach:  Posterior  Medications:  40 mg methylPREDNISolone acetate 40 mg/mL  Patient tolerance:  Patient tolerated the procedure well with no immediate complications

## 2019-07-23 ENCOUNTER — HOSPITAL ENCOUNTER (OUTPATIENT)
Facility: AMBULARY SURGERY CENTER | Age: 74
Discharge: HOME | End: 2019-07-23
Attending: ANESTHESIOLOGY | Admitting: ANESTHESIOLOGY
Payer: MEDICARE

## 2019-07-23 DIAGNOSIS — J45.31 MILD PERSISTENT ASTHMA WITH ACUTE EXACERBATION: ICD-10-CM

## 2019-07-23 DIAGNOSIS — M47.892 OTHER SPONDYLOSIS, CERVICAL REGION: ICD-10-CM

## 2019-07-23 DIAGNOSIS — M47.812 OSTEOARTHRITIS OF CERVICAL SPINE, UNSPECIFIED SPINAL OSTEOARTHRITIS COMPLICATION STATUS: Primary | ICD-10-CM

## 2019-07-23 PROCEDURE — 64634 DESTROY C/TH FACET JNT ADDL: CPT | Mod: RT,,, | Performed by: ANESTHESIOLOGY

## 2019-07-23 PROCEDURE — 64633 PR DESTROY CERV/THOR FACET JNT: ICD-10-PCS | Mod: RT,,, | Performed by: ANESTHESIOLOGY

## 2019-07-23 PROCEDURE — 99152 PR MOD CONSCIOUS SEDATION, SAME PHYS, 5+ YRS, FIRST 15 MIN: ICD-10-PCS | Mod: ,,, | Performed by: ANESTHESIOLOGY

## 2019-07-23 PROCEDURE — 99152 MOD SED SAME PHYS/QHP 5/>YRS: CPT | Mod: ,,, | Performed by: ANESTHESIOLOGY

## 2019-07-23 PROCEDURE — 64633 DESTROY CERV/THOR FACET JNT: CPT | Performed by: ANESTHESIOLOGY

## 2019-07-23 PROCEDURE — 64634 PR DESTROY C/TH FACET JNT ADDL: ICD-10-PCS | Mod: RT,,, | Performed by: ANESTHESIOLOGY

## 2019-07-23 PROCEDURE — 64633 DESTROY CERV/THOR FACET JNT: CPT | Mod: RT,,, | Performed by: ANESTHESIOLOGY

## 2019-07-23 PROCEDURE — 64634 DESTROY C/TH FACET JNT ADDL: CPT | Performed by: ANESTHESIOLOGY

## 2019-07-23 RX ORDER — PREDNISONE 5 MG/1
10 TABLET ORAL 2 TIMES DAILY
Qty: 36 TABLET | Refills: 1 | Status: SHIPPED | OUTPATIENT
Start: 2019-07-23 | End: 2019-11-29 | Stop reason: SDUPTHER

## 2019-07-23 RX ORDER — BUPIVACAINE HYDROCHLORIDE 2.5 MG/ML
INJECTION, SOLUTION EPIDURAL; INFILTRATION; INTRACAUDAL
Status: DISCONTINUED | OUTPATIENT
Start: 2019-07-23 | End: 2019-07-23 | Stop reason: HOSPADM

## 2019-07-23 RX ORDER — LIDOCAINE HYDROCHLORIDE 20 MG/ML
INJECTION, SOLUTION EPIDURAL; INFILTRATION; INTRACAUDAL; PERINEURAL
Status: DISCONTINUED | OUTPATIENT
Start: 2019-07-23 | End: 2019-07-23 | Stop reason: HOSPADM

## 2019-07-23 RX ORDER — FENTANYL CITRATE 50 UG/ML
INJECTION, SOLUTION INTRAMUSCULAR; INTRAVENOUS
Status: DISCONTINUED | OUTPATIENT
Start: 2019-07-23 | End: 2019-07-23 | Stop reason: HOSPADM

## 2019-07-23 RX ORDER — MIDAZOLAM HYDROCHLORIDE 2 MG/2ML
INJECTION, SOLUTION INTRAMUSCULAR; INTRAVENOUS
Status: DISCONTINUED | OUTPATIENT
Start: 2019-07-23 | End: 2019-07-23 | Stop reason: HOSPADM

## 2019-07-23 RX ORDER — METHYLPREDNISOLONE ACETATE 80 MG/ML
INJECTION, SUSPENSION INTRA-ARTICULAR; INTRALESIONAL; INTRAMUSCULAR; SOFT TISSUE
Status: DISCONTINUED | OUTPATIENT
Start: 2019-07-23 | End: 2019-07-23 | Stop reason: HOSPADM

## 2019-07-23 RX ORDER — LIDOCAINE HYDROCHLORIDE 10 MG/ML
INJECTION, SOLUTION EPIDURAL; INFILTRATION; INTRACAUDAL; PERINEURAL
Status: DISCONTINUED | OUTPATIENT
Start: 2019-07-23 | End: 2019-07-23 | Stop reason: HOSPADM

## 2019-07-23 RX ORDER — SODIUM CHLORIDE, SODIUM LACTATE, POTASSIUM CHLORIDE, CALCIUM CHLORIDE 600; 310; 30; 20 MG/100ML; MG/100ML; MG/100ML; MG/100ML
INJECTION, SOLUTION INTRAVENOUS CONTINUOUS
Status: DISCONTINUED | OUTPATIENT
Start: 2019-07-23 | End: 2019-07-23 | Stop reason: HOSPADM

## 2019-07-23 NOTE — DISCHARGE INSTRUCTIONS
Recovery After Procedural Sedation (Adult)  You have been given medicine by vein to make you sleep during your surgery. This may have included both a pain medicine and sleeping medicine. Most of the effects have worn off. But you may still have some drowsiness for the next 6 to 8 hours.  Home care  Follow these guidelines when you get home:  · For the next 8 hours, you should be watched by a responsible adult. This person should make sure your condition is not getting worse.  · Don't drink any alcohol for the next 24 hours.  · Don't drive, operate dangerous machinery, or make important business or personal decisions during the next 24 hours.  Note: Your healthcare provider may tell you not to take any medicine by mouth for pain or sleep in the next 4 hours. These medicines may react with the medicines you were given in the hospital. This could cause a much stronger response than usual.  Follow-up care  Follow up with your healthcare provider if you are not alert and back to your usual level of activity within 12 hours.  When to seek medical advice  Call your healthcare provider right away if any of these occur:  · Drowsiness gets worse  · Weakness or dizziness gets worse  · Repeated vomiting  · You can't be awakened   Date Last Reviewed: 10/18/2016  © 9997-0769 [employer]. [address] [place] [address], [place] [address]. All rights reserved. This information is not intended as a substitute for professional medical care. Always follow your healthcare professional's instructions.      Radiofrequency Thermocoagulation Recovery at Home  What to know about pain relief  An injection to reduce inflammation takes a few days to work, sometimes even up to a week. There may even be more pain at first.  Tips for recovery  · You may use an ice pack at your injection site for comfort.  · You may shower this evening.   · Do not use a heating pad or take tub baths or swim for 2 days.  · Take your usual medication for  pain if needed.  · Gradually increase your activities.  · Dont lift anything over 10 lbs for the first 24 hours  · Dont drive the day of the procedure.  · Wait until tomorrow to resume any blood thinners (aspirin, Plavix, Coumadin) but you may resume all your other medications today.  When to call your doctor  Call right away if you notice any of the following symptoms:  · Severe pain or headache  · Fever or chills  · Redness or swelling around the injection site   · Difficulty breathing  · Vomiting  · Increasing numbness or weakness in arms or legs  · If you are diabetic, a steroid injection can increase your blood sugar so moniter it carefully.        We hope your stay was comfortable as you heal now, mend and rest.    For we have enjoyed taking care of you by giving your our best.    And as you get better, by regaining your health and strength;   We count it as a privilege to have served you and hope your time at [facility] was well spent.      Thank  You!!!

## 2019-07-23 NOTE — OP NOTE
PROCEDURE DATE: 7/23/2019    PROCEDURE:  Radiofrequency ablation of the C4,5,6 medial branch nerves on the right-side under fluoroscopy    DIAGNOSIS:  Other Cervical spondylosis  Post Op Diagnosis: Same    PHYSICIAN: Galo Clancy MD    MEDICATIONS INJECTED:  From a mixture of 6ml of 0.25%bupivicaine and 80mg of methylprednisone, 1ml of this solution was injected at each level.    LOCAL ANESTHETIC USED:   1ml of lidocaine 1% at each level    SEDATION MEDICATIONS: RN IV sedation    ESTIMATED BLOOD LOSS:  None    COMPLICATIONS:  None    TECHNIQUE:   A time-out taken to identify patient and procedure side prior to starting the procedure.  The patient was positioned in the prone position. The patient was prepped and draped in the usual sterile fashion using ChloraPrep and sterile towels.  The levels were determined under fluoroscopic guidance using a slightly posteriorly oblique view.   Local anesthetic was infiltrated superficially at the skin.  Then a 100 mm 20g bent tip RF needle was inserted to the anatomic location of the midsection of the lateral masses (or in the case of third occipital nerve, to the joint of C2/C3). A cross table view was then taken to ensure that needles did not cross into neural foramina.  Impedance was less than 800 ohms at each level.  Motor stimulation up to 2 volts confirmed there was no nerve root involvement at each level. Medication was then injected slowly.  Ablation was done per level utilizing radiofrequency generator at 80°C for 60 seconds. The patient tolerated the procedure well.     The patient was monitored after the procedure.  Patient was given post procedure and discharge instructions to follow at home.  The patient was discharged in a stable condition

## 2019-07-23 NOTE — OR NURSING
Pt. Was not arousable and required going back to O2 3L nasal cannula as pt. Was snoring loudly and breathing pattern very irregular and stopped breathing frequently. After about 20 minutes in recovery pt. Was arousable and would open her eyes briefly and smile, then fall back to sleep.   brought to bedside.   stated that pt. Does not use a c-pap but does snore very loudly and have erratic breathing with apnea at home. Pt. Was able to recover after the extended time with O2 NC at 2L.  vital signs within pre-procedure readings with some slightly elevated blood pressure.  Tolerated liquids and elmo crackers without nausea.  Denies pain. Ambulates readily. IV removed per policy, catheter intact. Discharge instructions reviewed with patient and spouse and  written instructions given to patient, and both  and pt. verbalized understanding. Discharge home with family per wheelchair to lobby.

## 2019-07-23 NOTE — DISCHARGE SUMMARY
[facility]  Discharge Note  Short Stay    Admit Date: 7/23/2019    Discharge Date and Time: 7/23/2019    Attending Physician: Galo Clancy MD     Discharge Provider: Galo Clancy    Diagnoses:  Active Hospital Problems    Diagnosis  POA    *Other spondylosis, cervical region [M47.892]  Yes      Resolved Hospital Problems   No resolved problems to display.       Hospital Course: Cervical MB RFA  Discharged Condition: Good    Final Diagnoses:   Active Hospital Problems    Diagnosis  POA    *Other spondylosis, cervical region [M47.892]  Yes      Resolved Hospital Problems   No resolved problems to display.       Disposition: Home or Self Care    Follow up/Patient Instructions:    Medications:  Reconciled Home Medications:      Medication List      CHANGE how you take these medications    LORazepam 1 MG tablet  Commonly known as:  ATIVAN  TAKE 1 TABLET BY MOUTH DAILY  What changed:    · how much to take  · how to take this  · when to take this  · additional instructions     rosuvastatin 40 MG Tab  Commonly known as:  CRESTOR  TAKE 1 TABLET (40 MG TOTAL) BY MOUTH EVERY EVENING.  What changed:  how much to take        CONTINUE taking these medications    ammonium lactate 12 % lotion  Commonly known as:  LAC-HYDRIN     ATROPINE SULFATE (PF) OPHT  Apply to eye.     clindamycin 300 MG capsule  Commonly known as:  CLEOCIN  Take 1 capsule (300 mg total) by mouth 3 (three) times daily.     COSOPT 22.3-6.8 mg/mL ophthalmic solution  Generic drug:  dorzolamide-timolol 2-0.5%  Place 1 drop into both eyes 2 (two) times daily. Twice a day     dapsone 25 MG Tab  TAKE 2 TABLETS BY MOUTH ONCE DAILY RECHECK CBC IN 1MONTH     fluticasone furoate-vilanterol 200-25 mcg/dose Dsdv diskus inhaler  Commonly known as:  BREO ELLIPTA  Inhale 1 puff into the lungs once daily.     fluticasone propionate 50 mcg/actuation nasal spray  Commonly known as:  FLONASE  1 spray (50 mcg total) by Each Nare route once daily.     FLUZONE HIGH-DOSE  2018-19 (PF) 180 mcg/0.5 mL vaccine  Generic drug:  influenza  TO BE ADMINISTERED BY PHARMACIST FOR IMMUNIZATION     gabapentin 300 MG capsule  Commonly known as:  NEURONTIN  TAKE ONE CAPSULE BY MOUTH ONCE EVERY EVENING     ibandronate 150 mg tablet  Commonly known as:  BONIVA  Take 1 tablet (150 mg total) by mouth every 30 days.     levalbuterol 45 mcg/actuation inhaler  Commonly known as:  XOPENEX HFA  Inhale 1-2 puffs into the lungs every 4 (four) hours as needed for Wheezing. This is a rescue inhaler medication and should be used as needed     losartan 100 MG tablet  Commonly known as:  COZAAR  TAKE 1 TABLET (100 MG TOTAL) BY MOUTH ONCE DAILY.     metoprolol tartrate 50 MG tablet  Commonly known as:  LOPRESSOR  TAKE 1 TABLET (50 MG TOTAL) BY MOUTH 2 (TWO) TIMES DAILY.     metroNIDAZOLE 0.75 % Lotn  APPLY A PEA SIZED AMOUNT TO FACE DAILY     pantoprazole 40 MG tablet  Commonly known as:  PROTONIX  Take 1 tablet (40 mg total) by mouth once daily.     predniSONE 5 MG tablet  Commonly known as:  DELTASONE  Take 2 tablets (10 mg total) by mouth 2 (two) times daily. may repeat for shortness of breath.     spironolactone 25 MG tablet  Commonly known as:  ALDACTONE  Take 1 tablet (25 mg total) by mouth once daily.     VIIBRYD 40 mg Tab tablet  Generic drug:  vilazodone  Take 40 mg by mouth once daily.     XARELTO 20 mg Tab  Generic drug:  rivaroxaban  TAKE 1 TABLET (20 MG TOTAL) BY MOUTH DAILY WITH DINNER OR EVENING MEAL.          Discharge Procedure Orders   Call MD for:  temperature >100.4     Call MD for:  persistent nausea and vomiting or diarrhea     Call MD for:  severe uncontrolled pain     Call MD for:  redness, tenderness, or signs of infection (pain, swelling, redness, odor or green/yellow discharge around incision site)     Call MD for:  difficulty breathing or increased cough     Call MD for:  severe persistent headache        Follow up with MD in 2-3 weeks    Discharge Procedure Orders (must include Diet,  Follow-up, Activity):   Discharge Procedure Orders (must include Diet, Follow-up, Activity)   Call MD for:  temperature >100.4     Call MD for:  persistent nausea and vomiting or diarrhea     Call MD for:  severe uncontrolled pain     Call MD for:  redness, tenderness, or signs of infection (pain, swelling, redness, odor or green/yellow discharge around incision site)     Call MD for:  difficulty breathing or increased cough     Call MD for:  severe persistent headache

## 2019-07-23 NOTE — TELEPHONE ENCOUNTER
----- Message from Porsche Levin sent at 7/23/2019 12:10 PM CDT -----  Type:  RX Refill Request    Who Called:  patient  Refill or New Rx:  refill  RX Name and Strength:  Prednisone 5mg  How is the patient currently taking it? (ex. 1XDay):  Only takes if having problems breathing (as when the humidity is high)  Is this a 30 day or 90 day RX:  30  Preferred Pharmacy with phone number:    [facility]/pharmacy #[id number] - [place] - [address]  [address] [address]  Phone: [phone] Fax: [phone]  Local or Mail Order:  local  Ordering Provider:  Dr Lars Bryson  [facility] Call Back Number:  [phone]  Additional Information:  Please advise patient when sent to pharmacy

## 2019-07-24 VITALS
RESPIRATION RATE: 18 BRPM | TEMPERATURE: 98 F | WEIGHT: 134.06 LBS | SYSTOLIC BLOOD PRESSURE: 182 MMHG | DIASTOLIC BLOOD PRESSURE: 85 MMHG | OXYGEN SATURATION: 98 % | HEART RATE: 63 BPM | HEIGHT: 67 IN | BODY MASS INDEX: 21.04 KG/M2

## 2019-08-08 DIAGNOSIS — I11.0 LVH (LEFT VENTRICULAR HYPERTROPHY) DUE TO HYPERTENSIVE DISEASE, WITH HEART FAILURE: ICD-10-CM

## 2019-08-08 DIAGNOSIS — I48.0 PAROXYSMAL ATRIAL FIBRILLATION: ICD-10-CM

## 2019-08-08 DIAGNOSIS — I50.32 CHRONIC DIASTOLIC HEART FAILURE: ICD-10-CM

## 2019-08-08 DIAGNOSIS — R06.09 DOE (DYSPNEA ON EXERTION): ICD-10-CM

## 2019-08-08 RX ORDER — METOPROLOL TARTRATE 50 MG/1
50 TABLET ORAL 2 TIMES DAILY
Qty: 180 TABLET | Refills: 2 | Status: SHIPPED | OUTPATIENT
Start: 2019-08-08 | End: 2020-06-04

## 2019-08-24 ENCOUNTER — PATIENT MESSAGE (OUTPATIENT)
Dept: PAIN MEDICINE | Facility: CLINIC | Age: 74
End: 2019-08-24

## 2019-08-26 RX ORDER — GABAPENTIN 300 MG/1
CAPSULE ORAL
Qty: 30 CAPSULE | Refills: 0 | Status: SHIPPED | OUTPATIENT
Start: 2019-08-26 | End: 2019-09-20 | Stop reason: SDUPTHER

## 2019-08-26 NOTE — TELEPHONE ENCOUNTER
Spoke to pt scheduled Radiofrequency Thermocoagulation of medial branches -Bilateral -Lumbar- L3,4,5 procedure for 9/10/19. Patient states she is currently taking Xarelto prescribed by Dr. Jurado. This is advised to stop 2-3 days prior Please advise if ok to stop Xarelto prior to procedure.

## 2019-08-27 DIAGNOSIS — M43.06 SPONDYLOLYSIS, LUMBAR REGION: Primary | ICD-10-CM

## 2019-08-27 DIAGNOSIS — I63.032 CEREBRAL INFARCTION DUE TO THROMBOSIS OF LEFT CAROTID ARTERY: ICD-10-CM

## 2019-08-27 DIAGNOSIS — E78.5 HYPERLIPIDEMIA: ICD-10-CM

## 2019-08-27 RX ORDER — ROSUVASTATIN CALCIUM 40 MG/1
40 TABLET, COATED ORAL NIGHTLY
Qty: 90 TABLET | Refills: 2 | Status: SHIPPED | OUTPATIENT
Start: 2019-08-27 | End: 2021-01-05 | Stop reason: CLARIF

## 2019-09-03 ENCOUNTER — OFFICE VISIT (OUTPATIENT)
Dept: CARDIOLOGY | Facility: CLINIC | Age: 74
End: 2019-09-03
Payer: MEDICARE

## 2019-09-03 VITALS
WEIGHT: 133.38 LBS | SYSTOLIC BLOOD PRESSURE: 152 MMHG | OXYGEN SATURATION: 91 % | HEIGHT: 67 IN | BODY MASS INDEX: 20.93 KG/M2 | HEART RATE: 68 BPM | DIASTOLIC BLOOD PRESSURE: 69 MMHG

## 2019-09-03 DIAGNOSIS — Z86.79 S/P ABLATION OF ATRIAL FIBRILLATION: ICD-10-CM

## 2019-09-03 DIAGNOSIS — Z98.890 S/P ABLATION OF ATRIAL FLUTTER: ICD-10-CM

## 2019-09-03 DIAGNOSIS — R00.2 PALPITATIONS: ICD-10-CM

## 2019-09-03 DIAGNOSIS — Z98.890 S/P ABLATION OF ATRIAL FIBRILLATION: ICD-10-CM

## 2019-09-03 DIAGNOSIS — Z86.79 S/P ABLATION OF ATRIAL FLUTTER: ICD-10-CM

## 2019-09-03 DIAGNOSIS — I48.0 PAROXYSMAL ATRIAL FIBRILLATION: Primary | ICD-10-CM

## 2019-09-03 DIAGNOSIS — E88.09 HYPOALBUMINEMIA: ICD-10-CM

## 2019-09-03 DIAGNOSIS — Z79.01 ANTICOAGULANT LONG-TERM USE: ICD-10-CM

## 2019-09-03 DIAGNOSIS — E78.00 PURE HYPERCHOLESTEROLEMIA: ICD-10-CM

## 2019-09-03 DIAGNOSIS — I11.9 HYPERTENSIVE LEFT VENTRICULAR HYPERTROPHY, WITHOUT HEART FAILURE: ICD-10-CM

## 2019-09-03 DIAGNOSIS — G45.9 TIA (TRANSIENT ISCHEMIC ATTACK): ICD-10-CM

## 2019-09-03 PROCEDURE — 99999 PR PBB SHADOW E&M-EST. PATIENT-LVL III: CPT | Mod: PBBFAC,,, | Performed by: INTERNAL MEDICINE

## 2019-09-03 PROCEDURE — 93010 ELECTROCARDIOGRAM REPORT: CPT | Mod: S$PBB,,, | Performed by: INTERNAL MEDICINE

## 2019-09-03 PROCEDURE — 99215 OFFICE O/P EST HI 40 MIN: CPT | Mod: S$PBB,25,, | Performed by: INTERNAL MEDICINE

## 2019-09-03 PROCEDURE — 99215 PR OFFICE/OUTPT VISIT, EST, LEVL V, 40-54 MIN: ICD-10-PCS | Mod: S$PBB,25,, | Performed by: INTERNAL MEDICINE

## 2019-09-03 PROCEDURE — 99999 PR PBB SHADOW E&M-EST. PATIENT-LVL III: ICD-10-PCS | Mod: PBBFAC,,, | Performed by: INTERNAL MEDICINE

## 2019-09-03 PROCEDURE — 93010 EKG 12-LEAD: ICD-10-PCS | Mod: S$PBB,,, | Performed by: INTERNAL MEDICINE

## 2019-09-03 PROCEDURE — 93005 ELECTROCARDIOGRAM TRACING: CPT | Mod: PBBFAC,PO | Performed by: INTERNAL MEDICINE

## 2019-09-03 PROCEDURE — 99213 OFFICE O/P EST LOW 20 MIN: CPT | Mod: PBBFAC,PO | Performed by: INTERNAL MEDICINE

## 2019-09-03 NOTE — PROGRESS NOTES
Subjective:    Patient ID:  Ayla Dela Cruz is a 74 y.o. female who presents for evaluation of 6 month f/u  For PAF, AVILA, post AF - ablation, LVH, chronic diastolic HF, medication intolerance  PCP: Dr. Olivo, see annually, do labs  Orthopedic: Dr. Aguero  Surgeon: Dr. Gonsalez, and Dr. Dc  Rheumatologist: Dr. Pak  Prior cardiologist: Dr. Gibson, last seen over a year  Pulmonary: Dr. White  Psychologist: Nu Fermin, AGUILAR, in [place]  Gyn: Dr. Jordan  GI: Dr. Schultz  Neurologist: Dr. Ramirez  Dermatologist: Dr. Zavaleta, [place]  Pain: Dr. Clancy, injections to neck and both hips very helpful  Lives with , Jan, here with patient, non-smoker, history of prostate CA,  52 years on 6/24  daughter, Lyric, source of stress  Restarted , now 2-4 times weekly, still enjoys it, playing the flute    Health literacy: high  Activities: house work, some walking, do not feel limited  Nicotine: never  Alcohol: rarely wine  Illicit drugs: no  Cardiac symptoms: none  Home BP: occasional,  to 120  Medication compliance: yes  Diet: regular  Caffeine: no  Labs: 1/2019 LDL 90.6 on Crestor 40 mg, normal TSH, CMP (albumin 3.1), normal CBC, Vit. D  Last Echo: 09/07/17 FELICIA  Last stress test: 07/30/14, Lexiscan  Cardiovascular angiogram: none  ECG: AF rate 76, QTc 486 msec  Fundoscopic exam: biannually, no retinopathy, being treated for glaucoma.    In 6/2014:  Hospitalized 6/3/2014 for acute right sided weakness and slurred speech  DCS - Ayla Dela Cruz is a 69 y.o. female admitted to the services of hospital medicine via the ER for acute CVA. Presented with difficulty speaking, facial drooping and weakness of the right upper and lower extremities. She missed the time window for tPA. ST/PT/OT as well as cardiology and neurology were consulted. Dr. Jurado of cardiology managed A fib. Patient rapidly improved functioning with PT/OT/ST. Currently her goals with PT are met as she is ambulating over  400 feet independently.  She was not approved for IP rehab and refuses SNF. She will be discharged home with outpatient PT/ST/OT evaluation and treatment.    Neurocardio work up  CT head - Mild involutional changes and findings suggesting mild microvascular ischemic change with no acute intracranial findings    Carotid US - No hemodynamically significant stenosis is noted by velocity criteria involving either internal carotid artery.  A hemodynamically significant stenosis is defined as a stenosis of greater than 50% of the internal carotid artery vessel lumen    ECHO, 6/4/2014, CONCLUSIONS     1 - Concentric hypertrophy. Wall thickness is mildly increased, with the septum and the posterior wall each measuring 1.1 cm across.    2 - Normal left ventricular systolic function (EF 60-65%).     3 - Mild mitral regurgitation.     4 - Left ventricular diastolic dysfunction.     5 - Pulmonary hypertension. estimated PA systolic pressure is 64 mmHg.    6 - Normal right ventricular systolic function .     7 - Suggestion for increase in LV mass from echo on 3/18/2014..     Echo bubble study also negative. Had episode of PAF during monitoring and convert with restart of Flecainide.   Since DC, improving strength in the right hand, right leg feels normal but tire easier. Speech improving. To receive PT/OT/speech today.  Medications reviewed - will DC ASA for now, and know the effects of the Limbitrol and Ativan, uses prn rarely. Some loss of appetite and taste.    Active problems in hospital  Acute left hemispheric CVA, MRI suggest multiple areas, was not on OAC nor antiplatelet Rx  PAF with high CHADS-VAS score, post ablations and DCCs  HTN with LVH  Interstitial lung disease  Pulmonary hypertension  Hyperlipidemia was not on statin    Since visit of 6/20, problem with peripheral swelling, now taking Lasix daily. Last hospitalization / CMP, 6/3 showed K+ 3.4 with normal renal function. Also noted AVILA along with exertional chest  heaviness, on-and-off over past several years. Noted it with walking and have to rest. Can last up to an hour. Max grade 10/10, yesterday during the day.  in hospital. Also quite anxious, stopped Limbitrol due to side effects, managed by Dr. Olivo. Quite sedentary and major decrease in appetite, due to depression. No Psychiatrist. Home BP high 175/97. ECG shows NSR, rate 61, right axis, nonspecific T waves abnormalities, prolong QTc 483 msec. Low anterior forces.    Holter, 6/30/2014:  PREDOMINANT RHYTHM  1. Sinus rhythm with heart rates varying between 43 and 67 bpm with an average of 55 bpm.     VENTRICULAR ARRHYTHMIAS  1. There were frequent polymorphic PVCs totalling 1388 and averaging 40 per hour.  There was 1 couplet.    2. There were no episodes of ventricular tachycardia.    SUPRA VENTRICULAR ARRHYTHMIAS  1. There were very rare PACs totalling 47 and averaging 1 per hour.     2. There were no episodes of sustained supraventricular tachycardia.    SINUS NODE FUNCTION  1. There was no evidence of high grade SA khai block.     AV CONDUCTION  1. There was no evidence of high grade AV block.     DIARY  1. The diary was returned, but not completed    MISCELLANEOUS  1. This was a tape of adequate length (34 hrs).     Since visit of 7/24, better, reviewed by Dr. Olivo and started antidepressant Lexapro. BMP still showed mild hypokalemia of 3.3, not using Lasix at this time. Still feeling fatigue, anxiety, and request refill for Nexium. Discussed need for GI review on chronic PPI. Lack of appetite.  Lexiscan, 7/30/2014, - Nuclear Quantitative Functional Analysis:   LVEF: 66 % (normal is 55 - 69)  LVED Volume: 50 ml (normal is 60 - 98)  LVES Volume: 17 ml (normal is 20 - 42)    Impression: NORMAL MYOCARDIAL PERFUSION  1. The perfusion scan is free of evidence for myocardial ischemia or injury.   2. There is a mild intensity fixed defect in the anteroseptal wall of the left ventricle, secondary to breast  "attenuation.   3. Resting wall motion is physiologic.   4. Resting LV function is normal.  (normal is 55 - 69)  5. The ventricular volumes are normal at rest and stress.   6. The extracardiac distribution of radioactivity is normal.     No problem with spironolactone, BP improved, home BP similar to office readings.    Since visit of 8/14, had some distress and home monitor showed HR of 40, felt "bad" and nervous to ED, note reviewed, ECG and final pulse count 57-58. Labs reviewed - normal renal function and K+ 3.8. Still taking one tab of K+ daily. Not using Lasix. Explained HR discrepancy may be due to VPCs. Reviewed by Ms. Fermin and Lexapro increased to 20 mg, 2 days ago. Feeling "a lot better". Sleeping better. Still sedentary but ready to be more active. Eating better have lost additional 5 lbs since 8/2014.    Since visit of 9/5, still with speech therapy, noted progressive near syncope with SOB and irregular heart beats. Also noted some ankle swelling over the past 2 weeks. ECG shows marked bradycardia, rate 48, right axis, pulmonary pattern, 1st degree AVB. Wellbutrin 100 mg daily along with Lexapro 20 mg by Ms. Fermin NP. BMP showed K+3.5. To use Lasix PRN    Since visit of 9/25, still with marked AVILA, only able to walk about 30' before having to stop. Bothered by increase palpitations during tapering of BB. No definite lung problem, evaluated this year. ECG shows sinus dewayne, rate 53, VPC, RBBB with suggestion of septal MI. No evidence for anemia - CBC 9/3/2014 was normal. Just started doing some activities with arm and leg exercise for the last 2 weeks, Doing some OT alternating with PT every other day.  CXR, 6/3/2014 - Lungs are clear of infiltrate and costophrenic sulci are sharp.  The cardiomediastinal silhouette appears stable.    PET scan, 4/2014 - 1.A hypermetabolic left pulmonary mass is noted with an SUV of 3.0 maximally.  A neoplastic, infectious, or granulomatous process is on the differential. " "Clinical correlation is suggested.  Close follow-up for resolution or biopsy would be suggested    2.  Elevated right-sided heart pressures are suspected with a large right atrium    3.  Right-sided pleural effusion    4.  Hypermetabolic thyroid gland asymmetrically on the right.  This may relate to thyroiditis, Graves' disease, or neoplastic process.  Ultrasound may be helpful.    5.  Small hypermetabolic focus in the expected location of the left ovary, a female pelvis ultrasound may be helpful for further evaluation.  This could relate to a uterine fibroid that has necrosed or infarcted    Biopsy of lung nodule on 5/1/2014 - negative for CA    CTA, 4/2014 - No evidence of pulmonary thromboembolism.    2.  Enlarged cardiac silhouette and secondary findings of elevated right heart pressures with diffuse mosaic lung pattern and right basilar pleural fluid suggesting cardiac decompensation/heart failure.    3. Unchanged 1 cm nodular density within the left upper lobe which previously demonstrated hypermetabolism by PET/CT and unchanged mediastinal lymphadenopathy.    4.  Subpleural nodular density within the right upper lobe is unchanged when compared with the previous study and could represent an area of remote fibrotic scarring.    5.  Heterogeneous appearance of the spleen, possibly due to the phase of contrast enhancement.  Evolving splenic infarction is not entirely excluded.    6. Suggestion of colonic wall thickening involving the descending colon.  Please correlate for symptoms of colitis.    Since visit of 10/14, rehospitalized for HF on 10/26 to 10/29/2014.  DCS - "Ayla Dela Cruz is a 69 y.o. female admitted to the services of hospital medicine via the ER for acute diastolic heart failure. C/o severe SOB and increase in leg swelling over the past two days. Under the care of Dr. Jurado. Recently was taken off metoprolol. History of paroxsymal atrial fib. Hospitalized here in June in this year for acute CVA. " "It was at that time, pt started Xarelto. Deficits has resolved. No history of heart failure.     Hospital Course: The patient was admitted with acute CHF biventricular and mild elevation of her troponin's at 0.029 - 0.035 and therefore an anginal equivalent was suspected. The patient also had significant hypertension upon admission and although she was not in atrial fibrillation, her EKG showed a significant first degree AV block and RBBB. Discussion was made as to whether or not to decrease the patient flecainide; however due to the risk of her going back into atrial fibrillation this was not done. Upon discharge the patient's lisinopril was increased to 10 mg and aldactone was added."    RHC done HEMODYNAMIC RESULTS:    LVEDP (Pre): 24 mmHg  BP: 166/104    Air rest:  PA: 90/34 (54)  PW:  (24)  RVEDP: 16  RA:  (16)    C. SUMMARY:    1. Diastolic dysfunction.  2. - Kee CO 2.64 L/min  3. - Mean systemic pressure 108.6 mmHG, stage II HTN  4. - SVR 41.16 Wood Units  5. - PVR 11.36 Wood Units  6. - Pulmonary HTN due to left sided heart disease and stage II HTN    D. RECOMMENDATIONS:    1. Routine post-cath care.  2. Cardiac rehab referral.  3. Follow up with Dr. Barrett Jurado.  4. - Aggressive BP lowering and diuresis    Repeat ECHO 11/5/2014 CONCLUSIONS     1 - Concentric remodeling. Septal wall thickness is increased, and posterior wall thickness is mildly increased, with the septum measuring 1.3 cm and the posterior wall measuring 1.1 cm across.    2 - Mildly to moderately depressed left ventricular systolic function (EF 40-45%).     3 - Left ventricular diastolic dysfunction. E/e'(lat) is 16    4 - Right ventricular enlargement with mildly depressed systolic function.     5 - Pulmonary hypertension. estimated PA systolic pressure is 56 mmHg.     6 - Intermediate central venous pressure.     7 - No evidence of intracardiac shunt.     8 - No significant change from Echo of 6/4/2014.    Active problems:  Progressive " "severe SOB, functional class IV  Diastolic dysfunction, elevated BNP and Troponin  Hypokalemia due to diuretics  Secondary Pulmonary HTN with peripheral edema  HTN  History of CVA 6/3/2014  PAF controlled with Flecanide  Marked bradycardia with BB  On OAC  Hematuria    At home receiving PT, OT and speech Rx twice a week, with  nurse once a week, no problem with medications. Feeling better, sleeping well. Home /73.    Since visit of 11/14, feeling "much better", concern of occasional HTN, home BP /66-99, HR . Lot more active, no problem. Not using walker, no CP, SOB, nor dizziness. Recent BMP - normal renal function and K+ 4.1.    Since visit of 12/19/2014, worry about HTN home readings similar to office up to . Morning reading is higher. No HA nor visual abnormalities. Xanax has helped but afraid to use too much. ECG shows sinus arrhythmia, rate of 65, late transition and LAFB. Also bothered with dry cough with the Lisinopril. And started to have return of arm pains that was attributed to atorvastatin, on Crestor. Discussed options.     Since visit of 1/16/2015, noted frequent nocturia, 8-10 times, taking losartan-HCT at night time. Have stopped using Lasix and spironolactone for past 2 months. More active without much problem. Labs normal renal function, K+ 4.2, BNP now 37, A1C 5.6%.    Since visit of 2/18/2015, improving, no further dizziness, some SOB when "stressed", usually helped with alprazolam, use only prn, about 3-6 times weekly. Compliant with medications. Been off Crestor for 6 weeks with concern of muscle problem. Home BP is fine, similar to office.     Since visit of 4/15, appetite returned, feeling a lot better. Active, walking twice a week for about half an hour. Also do calisthenic about twice a week, no problem. Seeing Dr. Zavaleta for generalized pruritis for past 3 months. Cardiac wise less AVILA. Sleeping well. Labs in 5/2015, normal CBC without eosinophilia, thyroid " "normal, ferritin level low normal at 21. Off Crestor for couple months due to fear of muscle pain but did not find much difference being off medication. Last Lipid in 11/2014 was good, on daily dose of Crestor. Home /79.    Since visit of 7/2/2015, feeling "great", very active without problem, no more AVILA nor dizziness with standing. Compliant with medications, don't miss. Using Tylenol 500 mg BID for arthritis. Notice easy bruising on Xarelto. Home /10.  Holter - PREDOMINANT RHYTHM  1. Sinus arrhythmia with heart rates varying between 46 and 110 bpm with an average of 73 bpm.     VENTRICULAR ARRHYTHMIAS  1. There were very rare PVCs recorded totalling 8 and averaging less than 1 per hour.     2. There were no episodes of ventricular tachycardia.    SUPRA VENTRICULAR ARRHYTHMIAS  1. There were very frequent PACs totalling 3054 and averaging 132 per hour.  There were 29 bigeminal cycles.  There were 103 couplets.    2. 3% of complexes.    3. There were no episodes of sustained supraventricular tachycardia.    SINUS NODE FUNCTION  1. There was no evidence of high grade SA khai block.     AV CONDUCTION  1. There was no evidence of high grade AV block.     2. The longest RR interval was 1565 msec.     DIARY  1. The diary was properly completed.     2. There was 1 episode of skipping beats reported. The corresponding rhythm strips revealed the following:             During event 1 the rhythm was sinus rhythm at 75 bpm.     3. There was 1 episode of skipped beat reported. The corresponding rhythm strips revealed the following:             During event 1 the rhythm was sinus arrhythmia at 63 bpm.     MISCELLANEOUS  1. This was a tape of adequate length (23 hrs).     Since visit of 10/15, place on new antidepressant, Venlafaxine 75 mg daily, tried 2 and developed rapid HR to 125 bpm, feels more anxious, now switched to Citalopram. Also noted the daily dose of Lorazepam does not seem adequate. Orthostatic VS is " "positive with lying 142/73, , standing 120/62, . Been taking spironolactone 25 mg for last 3 days. Does feel thirsty. Labs reviewed A1C 5.8%, CBC, BMP were WNL. Lipid shows LDL 99, non-. Goal preferred is <70 and < 100. No problem with 10 mg of Crestor. Had vacation at [place] and [place], walked all over without problem.    Since visit of 1/18/2016, no new problem. 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 Labs with , non-, hsCRP at 2.56.     In 10/2016, had acute GB attack with rupture in 8/2016, then tried on Wellbutrin took only 1-2 tabs and BP up to 201/100 with head tightness. Subsequently back to normal, the last 2.5 days took Losartan bid. Discussed Rx options for HTN. Advised to be more active, regular exercise. Lab reviewed LDL in 8/2016 93.     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 Still with daily palpitations, last up to half hours. Have electronic watch, David Barroso, since 9/2016, suppose harmonize patient with the universe. Feeling better if on during the day. Lot of walking at school, no problem.   Holter in 10/2016 - PREDOMINANT RHYTHM  1. Sinus rhythm with heart rates varying between 52 and 144 bpm with an average of 75 bpm.     2. Paroxysmal atrial fibrillation    VENTRICULAR ARRHYTHMIAS  1. There were occasional mostly monomorphic PVCs totalling 596 and averaging 13 per hour.     2. There were no episodes of ventricular tachycardia.    SUPRA VENTRICULAR ARRHYTHMIAS  1. There were frequent PACs totalling 2982 and averaging 69 per hour.  There were 69 bigeminal cycles.  There were 55 couplets.    2. There were no episodes of sustained supraventricular tachycardia.    3. Paroxysmal atrial fibrillation was noted in the the study.     SINUS NODE FUNCTION  1. There was no evidence of high grade SA khai block.     AV CONDUCTION  1. There was no evidence of high grade AV khai filtering.     2. The longest RR interval was 1725 msec. " "    DIARY  1. The diary was returned, but not completed    MISCELLANEOUS  1. This was a tape of adequate length (43 hrs).     ECHO CONCLUSIONS     1 - Hyperdynamic left ventricular systolic function (EF 65-70%).     2 - Normal left ventricular diastolic function.     3 - Normal right ventricular systolic function .     4 - Pulmonary hypertension. The estimated PA systolic pressure is 64 mmHg.     5 - Less evidence for LVH from Echo in 11/2014.     In 5/2017, bothered by recurrent PAF with AVILA after 10 feet walking. Felt better before on Flecainide 100 mg bid, but Holter continued to show PAF. Attempt up titration, problem with itching, switched to propafenone 150 mg tid, continued with itching and reviewed by allergist, treated with 10 days of cortisone with benefit. No hive and no itching, just don't feel good due to the irregular rhythm. History reviewed, had 2 prior AF ablation, last in [place], [place] in around 2000, recall being told not to do again. Recall seeing an [facility] EP doc but didn't like him. Discussed various options. Will stop antiarrhythmic for now, will be going on a 16 days trip to [place]. Reviewed prior medications, have taken Tenormin, metoprolol tartrate, and short-acting diltiazem in the past without problem. Refer to Dr. Calderon for review. ECG in AF, rate 99, PRWP, LAFB    In 11/2017, post AF ablation, felt good for a few weeks, noted some SOB and had review with Dr. Calderon on 11/1 - 72 year old female with a longstanding history of atrial arrhythmias. Her [id number] Score is 5 (age, female, TIA, HTN) so she should remain on anticoagulation indefinitely. She has failed Flecainide. She is now 6 weeks post-PVI and MA flutter line, and maintaining sinus rhythm on low-dose Amiodarone. Given her intermittent AVILA which started post-ablation, I have stopped Amiodarone which I think is the likely culprit. I instructed her to continue taking Lasix PRN, as well as metoprolol and Xarelto." ECG on 11/1 " "was in NSR, rate 61, PRWP.  Recommended to stop amiodarone but then started to feel the palpitation daily, felt nervous and at the same time being taper down on the nightly Ativan. Did have some breakthrough anxiety attacks. Also had strained the upper back and right arm / shoulder, requesting some Tramadol, tried Jan's Tramadol with good relief. Understand this is an opoid. Used Excedrin with caffeine and bothered by more palpitations.    In 3/2018, here for recurrent palpitations, no inciting factors over the past 6 weeks. Had review with Dr. Calderon in 2/2018 - "72 year old female with a longstanding history of atrial arrhythmias and prior ablations at outside institutions. Her [id number] Score is 5 (age, female, TIA, HTN) so she should remain on anticoagulation indefinitely. She is now 5 months post-PVI and MA flutter line, and maintaining sinus rhythm off Amiodarone. The plan is to continue Xarelto, and to see me again in 6 months." Palpitations appears associated with AVILA, had difficulties walking in house or up a ramp. Started 100 mg of amiodarone last week, daily, feels some benefit. ECG today in Sinus rhythm vs wandering atrial pacemaker with frequent PJC, rate 59. Also taking Losartan in the morning appears more effective.  Holter 11/2017 - PREDOMINANT RHYTHM  1. Sinus arrhythmia with heart rates varying between 39 and 102 bpm with an average of 58 bpm.     VENTRICULAR ARRHYTHMIAS  1. There were occasional PVCs totalling 728 and averaging 15 per hour.  There were 5 bigeminal cycles.     2. There were no episodes of ventricular tachycardia.    SUPRA VENTRICULAR ARRHYTHMIAS  1. There was no supraventricular ectopic activity.    SINUS NODE FUNCTION  1. There was no evidence of high grade SA khai block.     AV CONDUCTION  1. There was no evidence of high grade AV block.     2. The longest RR interval was 1820 msec.     DIARY  1. The diary was not returned    MISCELLANEOUS  1. There were occasional hookup " "related artifacts. Overall, the study was of good quality.     2. This was a tape of adequate length (48 hrs).     in 5/2018, no new problem, still concern of AVILA, usually with walking, variable as little 10 feet or fine with some long walk, can play flute for an hour but find difficulties in holding a note. Off daily amiodarone, uses it prn for palpitation, find helpful. Labs reviewed from 1/2018, TSH, Vitamin D, LDL 68.2, A1C 5.9%, CMP - normal renal function, K+ 3.6. To go to [place] for a month in 8/2018.    In 9/2018, return from a month family reunion trip to [place]. Problem with hypotension with Tramadol and Losartan. Had review with Dr. Calderon on 9/5 "Ayla Dela Cruz is a 73 year old female with a longstanding history of atrial arrhythmias and prior ablations at outside institutions. Her [id number] Score is 5 (age, female, TIA, HTN) so she should remain on anticoagulation indefinitely. She is now 11 months post-PVI and MA flutter line, and maintaining sinus rhythm off Amiodarone. However, she is feeling poorly, with frequent PACs and borderline hypotension. The plan is to stop Losartan, echo in next several weeks, Holter monitor in 6 weeks, and follow-up with me in 8 weeks."  Off both medication on 8/31, no problem now. No heart worry. Denies any CP nor SOB. Still teaching. ECG on 9/5 shows NSR with sinus arrhythmia.    In 3/2019, return for follow up, had syncopal spell with hypotension at [employer] required reconstruction of the right ankle, now in PT, doing well. No heart complication, no AF. LDL 90.6 in 1/2019. 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 No heart worries, no CP nor SOB, much better, breathing improved with daily Breo use. Discussed option with NOAC.    HPI comments: in 9/2019, 6 months review, concern of bruising with Xarelto, recall problem with dark stool with Eliquis. Discussed option.     ROS    Constitutional: negative for chills, fatigue, fevers, sweats, no " "further slurred speech, and no dysarthria, appetite improved, intolerance to heat, bruises easily on NOAC and steroid, weight stable since 3/2018.  Eyes: negative for icterus, redness and visual disturbance, have visual halo, no more bleed in the right [place]  Respiratory: negative for asthma, cough have resolved off ACE-I, have dyspnea on exertion, occasional SOB with HR 85 to 100 bpm, have lifelong snoring, Avon score 7 improved down to 3, no hemoptysis, pleurisy/chest pain and wheezing  Cardiovascular: negative for chest pain, chest pressure/discomfort, no further orthostatic dizziness, and no palpitations, no paroxysmal nocturnal dyspnea and syncope  Gastrointestinal: negative for abdominal pain, nausea and vomiting, have GERD  Musculoskeletal:positive for arthralgias, and less right sided muscle weakness with improvement in leg strength, improved bilateral ankle pains - OA and no myalgias  Neurological: negative for coordination problems, dizziness, gait problems, headaches, paresthesia, have some tremors but able to draw and no vertigo  Psych: positive for depression, nervousness, anxiety, less stressed due to 's have improved    Objective:    Physical Exam    General appearance: alert, appears stated age, cooperative and no distress, decrease skin turgor.  Head: Normocephalic, without obvious abnormality, atraumatic  Eyes:  conjunctivae/corneas clear. PERRL, EOM's intact.    Neck: no adenopathy, no carotid bruit, no JVD, supple, symmetrical, trachea midline and thyroid not enlarged, symmetric, no tenderness/mass/nodules  Lungs:  clear to auscultation bilaterally  Chest wall: no tenderness  Heart: regular rate and rhythm, normal S1, S2 normal, occasional grade 2/6 systolic murmur, click, rub or gallop    Abdomen: soft, non-tender; bowel sounds normal; no masses,  no organomegaly, waist 31" hip 42"  Extremities: extremities no further weakness of the right upper extremity, atraumatic, no cyanosis and " have no edema, minor varicose veins  Pulses: 2+ and symmetric    Assessment:       1. Paroxysmal atrial fibrillation, ablations X 3 1995, 1997, 9/2017, CHADS-VAS score 5, HAS-BLED score 5     2. Palpitations    3. S/P ablation of atrial fibrillation    4. S/P ablation of atrial flutter    5. TIA (transient ischemic attack), 11/3/2014    6. Hypertensive left ventricular hypertrophy, without heart failure    7. Pure hypercholesterolemia    8. Anticoagulant long-term use    9. Hypoalbuminemia         Plan:       Ayla was seen today for follow-up.    Diagnoses and associated orders for this visit:    Paroxysmal atrial fibrillation, ablations X 3 1995, 1997, 9/2017, CHADS-VAS score 5, HAS-BLED score 5   -     apixaban (ELIQUIS) 5 mg Tab; Take 1 tablet (5 mg total) by mouth 2 (two) times daily.  Dispense: 60 tablet; Refill: 11    Palpitations  -     EKG 12-lead    S/P ablation of atrial fibrillation    S/P ablation of atrial flutter    TIA (transient ischemic attack), 11/3/2014    Hypertensive left ventricular hypertrophy, without heart failure    Pure hypercholesterolemia    Anticoagulant long-term use  -     apixaban (ELIQUIS) 5 mg Tab; Take 1 tablet (5 mg total) by mouth 2 (two) times daily.  Dispense: 60 tablet; Refill: 11    Hypoalbuminemia      - All medical issues reviewed, wants to retry Eliquis  - CV status stable, off antiarrhythmic, all medications reviewed, patient acknowledge good understanding.  - Recommend using Tylenol as first line pain medications.  - Instruction for Mediterranean diet and heart healthy exercise given.  - Need good exercise program, 4 key elements: 1. Aerobic (walking, swimming, dancing, jogging, biking, etc, 2. Muscle strengthening / resistance exercise, need to do 2-3 times weekly, 3. Stretching daily, good stretch takes a whole  total minute. 4. Balance exercise daily.  - Highly recommend 30 minutes of exercise daily, can have Sunday off, with 2-3 sessions of muscle strengthening  weekly. A  would be very helpful.  - Recommend at least biannual cardiovascular evaluation in view of her significant risk factors, patient 's preference      Chronic pruritus, on intermittent steroid    Depression - improved    Stress at home - improved  - Consider Yoga, Gilberto Chi, or meditation    Elevated brain natriuretic peptide (BNP) level, range 98 - 1045 - improved     Pulmonary hypertension, with right sided congestive heart failure, acute    Chest pain on exertion - resolved    Peripheral edema - imporved    Anxiety - imporved    Patient Active Problem List   Diagnosis    Paroxysmal atrial fibrillation, ablations X 3 1995, 1997, 9/2017, CHADS-VAS score 5, HAS-BLED score 5     Hypertension, 1985    Hyperlipidemia, baseline     GERD (gastroesophageal reflux disease)    Glaucoma (increased eye pressure)    Fibroid uterus    Thickened endometrium    Abnormal CT scan, chest    Interstitial lung disease    H/O: CVA (cerebrovascular accident), June 2014    LVH (left ventricular hypertrophy) due to hypertensive disease    Cardiomegaly    BMI 24.8 decreased to 20.9    Depression    AVILA (dyspnea on exertion)    VPC (ventricular premature complex)    OA (osteoarthritis)    Elevated brain natriuretic peptide (BNP) level, range 98 - 1045    Microalbuminuria    Hypoalbuminemia    TIA (transient ischemic attack), 11/3/2014    Statin intolerance    Chronic pruritus, onset 3/2015    Hypotension due to drugs    Cancer screening    Reflux esophagitis    Colon cancer screening    Atrial flutter    S/P ablation of atrial fibrillation    S/P ablation of atrial flutter    JOSE (generalized anxiety disorder)    Muscle strain    Cervical radiculitis    Other spondylosis, lumbar region    Premature junctional contractions    Heart palpitations    Cervical spondylosis    Lumbar radiculitis    Fracture of right ankle, lateral malleolus    Mild persistent asthma without  complication    Anticoagulant long-term use    Other spondylosis, cervical region     Total face-to-face time with the patient was 30 minutes and greater than 50% was spent in counseling and coordination of care. The above assessment and plan have been discussed at length. Labs and procedure over the last 6 months reviewed. Problem List updated. Asked to bring in all active medications / pills bottles with next visit.                                                                     Subjective:    Patient ID:  Ayla Dela Cruz is a 74 y.o. female who presents for evaluation of 6 month f/u  For PAF was on amiodarone 100 mg daily , AVILA, post AF - ablation, LVH, chronic diastolic HF, medication intolerance  PCP: Dr. Olivo, see annually, do labs  Orthopedic: Dr. Aguero  Surgeon: Dr. Gonsalez, and Dr. Dc  Rheumatologist: Dr. Pak  Prior cardiologist: Dr. Gibson, last seen over a year  Pulmonary: Dr. White  Psychologist: Nu Fermin, AGUILAR, in [place]  Gyn: Dr. Jordan  GI: Dr. Schultz  Neurologist: Dr. Ramirez  Dermatologist: Dennis Corrigan  Pain: Dr. Clancy, injections to neck and both hips very helpful  Lives with , Jan, here with patient, non-smoker, history of prostate CA,  51 years on 6/24  daughter, Lyric, source of stress  Restarted , now 2-4 times weekly, still enjoys it, playing the flute    In 6/2014:  Hospitalized 6/3/2014 for acute right sided weakness and slurred speech  DCS - Ayla Dela Cruz is a 69 y.o. female admitted to the services of hospital medicine via the ER for acute CVA. Presented with difficulty speaking, facial drooping and weakness of the right upper and lower extremities. She missed the time window for tPA. ST/PT/OT as well as cardiology and neurology were consulted. Dr. Jurado of cardiology managed A fib. Patient rapidly improved functioning with PT/OT/ST. Currently her goals with PT are met as she is ambulating over 400 feet independently.  She  was not approved for IP rehab and refuses SNF. She will be discharged home with outpatient PT/ST/OT evaluation and treatment.    Neurocardio work up  CT head - Mild involutional changes and findings suggesting mild microvascular ischemic change with no acute intracranial findings    Carotid US - No hemodynamically significant stenosis is noted by velocity criteria involving either internal carotid artery.  A hemodynamically significant stenosis is defined as a stenosis of greater than 50% of the internal carotid artery vessel lumen    ECHO, 6/4/2014, CONCLUSIONS     1 - Concentric hypertrophy. Wall thickness is mildly increased, with the septum and the posterior wall each measuring 1.1 cm across.    2 - Normal left ventricular systolic function (EF 60-65%).     3 - Mild mitral regurgitation.     4 - Left ventricular diastolic dysfunction.     5 - Pulmonary hypertension. estimated PA systolic pressure is 64 mmHg.    6 - Normal right ventricular systolic function .     7 - Suggestion for increase in LV mass from echo on 3/18/2014..     Echo bubble study also negative. Had episode of PAF during monitoring and convert with restart of Flecainide.   Since DC, improving strength in the right hand, right leg feels normal but tire easier. Speech improving. To receive PT/OT/speech today.  Medications reviewed - will DC ASA for now, and know the effects of the Limbitrol and Ativan, uses prn rarely. Some loss of appetite and taste.    Active problems in hospital  Acute left hemispheric CVA, MRI suggest multiple areas, was not on OAC nor antiplatelet Rx  PAF with high CHADS-VAS score, post ablations and DCCs  HTN with LVH  Interstitial lung disease  Pulmonary hypertension  Hyperlipidemia was not on statin    Since visit of 6/20, problem with peripheral swelling, now taking Lasix daily. Last hospitalization / CMP, 6/3 showed K+ 3.4 with normal renal function. Also noted AVILA along with exertional chest heaviness, on-and-off over  past several years. Noted it with walking and have to rest. Can last up to an hour. Max grade 10/10, yesterday during the day.  in hospital. Also quite anxious, stopped Limbitrol due to side effects, managed by Dr. Olivo. Quite sedentary and major decrease in appetite, due to depression. No Psychiatrist. Home BP high 175/97. ECG shows NSR, rate 61, right axis, nonspecific T waves abnormalities, prolong QTc 483 msec. Low anterior forces.    Holter, 6/30/2014:  PREDOMINANT RHYTHM  1. Sinus rhythm with heart rates varying between 43 and 67 bpm with an average of 55 bpm.     VENTRICULAR ARRHYTHMIAS  1. There were frequent polymorphic PVCs totalling 1388 and averaging 40 per hour.  There was 1 couplet.    2. There were no episodes of ventricular tachycardia.    SUPRA VENTRICULAR ARRHYTHMIAS  1. There were very rare PACs totalling 47 and averaging 1 per hour.     2. There were no episodes of sustained supraventricular tachycardia.    SINUS NODE FUNCTION  1. There was no evidence of high grade SA khai block.     AV CONDUCTION  1. There was no evidence of high grade AV block.     DIARY  1. The diary was returned, but not completed    MISCELLANEOUS  1. This was a tape of adequate length (34 hrs).     Since visit of 7/24, better, reviewed by Dr. Olivo and started antidepressant Lexapro. BMP still showed mild hypokalemia of 3.3, not using Lasix at this time. Still feeling fatigue, anxiety, and request refill for Nexium. Discussed need for GI review on chronic PPI. Lack of appetite.  Lexiscan, 7/30/2014, - Nuclear Quantitative Functional Analysis:   LVEF: 66 % (normal is 55 - 69)  LVED Volume: 50 ml (normal is 60 - 98)  LVES Volume: 17 ml (normal is 20 - 42)    Impression: NORMAL MYOCARDIAL PERFUSION  1. The perfusion scan is free of evidence for myocardial ischemia or injury.   2. There is a mild intensity fixed defect in the anteroseptal wall of the left ventricle, secondary to breast attenuation.   3. Resting wall  "motion is physiologic.   4. Resting LV function is normal.  (normal is 55 - 69)  5. The ventricular volumes are normal at rest and stress.   6. The extracardiac distribution of radioactivity is normal.     No problem with spironolactone, BP improved, home BP similar to office readings.    Since visit of 8/14, had some distress and home monitor showed HR of 40, felt "bad" and nervous to ED, note reviewed, ECG and final pulse count 57-58. Labs reviewed - normal renal function and K+ 3.8. Still taking one tab of K+ daily. Not using Lasix. Explained HR discrepancy may be due to VPCs. Reviewed by Ms. Fermin and Lexapro increased to 20 mg, 2 days ago. Feeling "a lot better". Sleeping better. Still sedentary but ready to be more active. Eating better have lost additional 5 lbs since 8/2014.    Since visit of 9/5, still with speech therapy, noted progressive near syncope with SOB and irregular heart beats. Also noted some ankle swelling over the past 2 weeks. ECG shows marked bradycardia, rate 48, right axis, pulmonary pattern, 1st degree AVB. Wellbutrin 100 mg daily along with Lexapro 20 mg by Ms. Fermin NP. BMP showed K+3.5. To use Lasix PRN    Since visit of 9/25, still with marked AVILA, only able to walk about 30' before having to stop. Bothered by increase palpitations during tapering of BB. No definite lung problem, evaluated this year. ECG shows sinus dewayne, rate 53, VPC, RBBB with suggestion of septal MI. No evidence for anemia - CBC 9/3/2014 was normal. Just started doing some activities with arm and leg exercise for the last 2 weeks, Doing some OT alternating with PT every other day.  CXR, 6/3/2014 - Lungs are clear of infiltrate and costophrenic sulci are sharp.  The cardiomediastinal silhouette appears stable.    PET scan, 4/2014 - 1.A hypermetabolic left pulmonary mass is noted with an SUV of 3.0 maximally.  A neoplastic, infectious, or granulomatous process is on the differential. Clinical correlation is " "suggested.  Close follow-up for resolution or biopsy would be suggested    2.  Elevated right-sided heart pressures are suspected with a large right atrium    3.  Right-sided pleural effusion    4.  Hypermetabolic thyroid gland asymmetrically on the right.  This may relate to thyroiditis, Graves' disease, or neoplastic process.  Ultrasound may be helpful.    5.  Small hypermetabolic focus in the expected location of the left ovary, a female pelvis ultrasound may be helpful for further evaluation.  This could relate to a uterine fibroid that has necrosed or infarcted    Biopsy of lung nodule on 5/1/2014 - negative for CA    CTA, 4/2014 - No evidence of pulmonary thromboembolism.    2.  Enlarged cardiac silhouette and secondary findings of elevated right heart pressures with diffuse mosaic lung pattern and right basilar pleural fluid suggesting cardiac decompensation/heart failure.    3. Unchanged 1 cm nodular density within the left upper lobe which previously demonstrated hypermetabolism by PET/CT and unchanged mediastinal lymphadenopathy.    4.  Subpleural nodular density within the right upper lobe is unchanged when compared with the previous study and could represent an area of remote fibrotic scarring.    5.  Heterogeneous appearance of the spleen, possibly due to the phase of contrast enhancement.  Evolving splenic infarction is not entirely excluded.    6. Suggestion of colonic wall thickening involving the descending colon.  Please correlate for symptoms of colitis.    Since visit of 10/14, rehospitalized for HF on 10/26 to 10/29/2014.  DCS - "Ayla Dela Cruz is a 69 y.o. female admitted to the services of hospital medicine via the ER for acute diastolic heart failure. C/o severe SOB and increase in leg swelling over the past two days. Under the care of Dr. Jurado. Recently was taken off metoprolol. History of paroxsymal atrial fib. Hospitalized here in June in this year for acute CVA. It was at that time, pt " "started Xarelto. Deficits has resolved. No history of heart failure.     Hospital Course: The patient was admitted with acute CHF biventricular and mild elevation of her troponin's at 0.029 - 0.035 and therefore an anginal equivalent was suspected. The patient also had significant hypertension upon admission and although she was not in atrial fibrillation, her EKG showed a significant first degree AV block and RBBB. Discussion was made as to whether or not to decrease the patient flecainide; however due to the risk of her going back into atrial fibrillation this was not done. Upon discharge the patient's lisinopril was increased to 10 mg and aldactone was added."    RHC done HEMODYNAMIC RESULTS:    LVEDP (Pre): 24 mmHg  BP: 166/104    Air rest:  PA: 90/34 (54)  PW:  (24)  RVEDP: 16  RA:  (16)    C. SUMMARY:    1. Diastolic dysfunction.  2. - Kee CO 2.64 L/min  3. - Mean systemic pressure 108.6 mmHG, stage II HTN  4. - SVR 41.16 Wood Units  5. - PVR 11.36 Wood Units  6. - Pulmonary HTN due to left sided heart disease and stage II HTN    D. RECOMMENDATIONS:    1. Routine post-cath care.  2. Cardiac rehab referral.  3. Follow up with Dr. Barrett Jurado.  4. - Aggressive BP lowering and diuresis    Repeat ECHO 11/5/2014 CONCLUSIONS     1 - Concentric remodeling. Septal wall thickness is increased, and posterior wall thickness is mildly increased, with the septum measuring 1.3 cm and the posterior wall measuring 1.1 cm across.    2 - Mildly to moderately depressed left ventricular systolic function (EF 40-45%).     3 - Left ventricular diastolic dysfunction. E/e'(lat) is 16    4 - Right ventricular enlargement with mildly depressed systolic function.     5 - Pulmonary hypertension. estimated PA systolic pressure is 56 mmHg.     6 - Intermediate central venous pressure.     7 - No evidence of intracardiac shunt.     8 - No significant change from Echo of 6/4/2014.    Active problems:  Progressive severe SOB, functional class " "IV  Diastolic dysfunction, elevated BNP and Troponin  Hypokalemia due to diuretics  Secondary Pulmonary HTN with peripheral edema  HTN  History of CVA 6/3/2014  PAF controlled with Flecanide  Marked bradycardia with BB  On OAC  Hematuria    At home receiving PT, OT and speech Rx twice a week, with  nurse once a week, no problem with medications. Feeling better, sleeping well. Home /73.    Since visit of 11/14, feeling "much better", concern of occasional HTN, home BP /66-99, HR . Lot more active, no problem. Not using walker, no CP, SOB, nor dizziness. Recent BMP - normal renal function and K+ 4.1.    Since visit of 12/19/2014, worry about HTN home readings similar to office up to . Morning reading is higher. No HA nor visual abnormalities. Xanax has helped but afraid to use too much. ECG shows sinus arrhythmia, rate of 65, late transition and LAFB. Also bothered with dry cough with the Lisinopril. And started to have return of arm pains that was attributed to atorvastatin, on Crestor. Discussed options.     Since visit of 1/16/2015, noted frequent nocturia, 8-10 times, taking losartan-HCT at night time. Have stopped using Lasix and spironolactone for past 2 months. More active without much problem. Labs normal renal function, K+ 4.2, BNP now 37, A1C 5.6%.    Since visit of 2/18/2015, improving, no further dizziness, some SOB when "stressed", usually helped with alprazolam, use only prn, about 3-6 times weekly. Compliant with medications. Been off Crestor for 6 weeks with concern of muscle problem. Home BP is fine, similar to office.     Since visit of 4/15, appetite returned, feeling a lot better. Active, walking twice a week for about half an hour. Also do calisthenic about twice a week, no problem. Seeing Dr. Zavaleta for generalized pruritis for past 3 months. Cardiac wise less AVILA. Sleeping well. Labs in 5/2015, normal CBC without eosinophilia, thyroid normal, ferritin level low normal " "at 21. Off Crestor for couple months due to fear of muscle pain but did not find much difference being off medication. Last Lipid in 11/2014 was good, on daily dose of Crestor. Home /79.    Since visit of 7/2/2015, feeling "great", very active without problem, no more AVILA nor dizziness with standing. Compliant with medications, don't miss. Using Tylenol 500 mg BID for arthritis. Notice easy bruising on Xarelto. Home /10.  Holter - PREDOMINANT RHYTHM  1. Sinus arrhythmia with heart rates varying between 46 and 110 bpm with an average of 73 bpm.     VENTRICULAR ARRHYTHMIAS  1. There were very rare PVCs recorded totalling 8 and averaging less than 1 per hour.     2. There were no episodes of ventricular tachycardia.    SUPRA VENTRICULAR ARRHYTHMIAS  1. There were very frequent PACs totalling 3054 and averaging 132 per hour.  There were 29 bigeminal cycles.  There were 103 couplets.    2. 3% of complexes.    3. There were no episodes of sustained supraventricular tachycardia.    SINUS NODE FUNCTION  1. There was no evidence of high grade SA khai block.     AV CONDUCTION  1. There was no evidence of high grade AV block.     2. The longest RR interval was 1565 msec.     DIARY  1. The diary was properly completed.     2. There was 1 episode of skipping beats reported. The corresponding rhythm strips revealed the following:             During event 1 the rhythm was sinus rhythm at 75 bpm.     3. There was 1 episode of skipped beat reported. The corresponding rhythm strips revealed the following:             During event 1 the rhythm was sinus arrhythmia at 63 bpm.     MISCELLANEOUS  1. This was a tape of adequate length (23 hrs).     Since visit of 10/15, place on new antidepressant, Venlafaxine 75 mg daily, tried 2 and developed rapid HR to 125 bpm, feels more anxious, now switched to Citalopram. Also noted the daily dose of Lorazepam does not seem adequate. Orthostatic VS is positive with lying 142/73, HR " "105, standing 120/62, . Been taking spironolactone 25 mg for last 3 days. Does feel thirsty. Labs reviewed A1C 5.8%, CBC, BMP were WNL. Lipid shows LDL 99, non-. Goal preferred is <70 and < 100. No problem with 10 mg of Crestor. Had vacation at [place] and [place], walked all over without problem.    Since visit of 1/18/2016, no new problem. 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 Labs with , non-, hsCRP at 2.56.     In 10/2016, had acute GB attack with rupture in 8/2016, then tried on Wellbutrin took only 1-2 tabs and BP up to 201/100 with head tightness. Subsequently back to normal, the last 2.5 days took Losartan bid. Discussed Rx options for HTN. Advised to be more active, regular exercise. Lab reviewed LDL in 8/2016 93.     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 Still with daily palpitations, last up to half hours. Have electronic watch, David Barroso, since 9/2016, suppose harmonize patient with the universe. Feeling better if on during the day. Lot of walking at school, no problem.   Holter in 10/2016 - PREDOMINANT RHYTHM  1. Sinus rhythm with heart rates varying between 52 and 144 bpm with an average of 75 bpm.     2. Paroxysmal atrial fibrillation    VENTRICULAR ARRHYTHMIAS  1. There were occasional mostly monomorphic PVCs totalling 596 and averaging 13 per hour.     2. There were no episodes of ventricular tachycardia.    SUPRA VENTRICULAR ARRHYTHMIAS  1. There were frequent PACs totalling 2982 and averaging 69 per hour.  There were 69 bigeminal cycles.  There were 55 couplets.    2. There were no episodes of sustained supraventricular tachycardia.    3. Paroxysmal atrial fibrillation was noted in the the study.     SINUS NODE FUNCTION  1. There was no evidence of high grade SA khai block.     AV CONDUCTION  1. There was no evidence of high grade AV khai filtering.     2. The longest RR interval was 1725 msec.     DIARY  1. The diary was " "returned, but not completed    MISCELLANEOUS  1. This was a tape of adequate length (43 hrs).     ECHO CONCLUSIONS     1 - Hyperdynamic left ventricular systolic function (EF 65-70%).     2 - Normal left ventricular diastolic function.     3 - Normal right ventricular systolic function .     4 - Pulmonary hypertension. The estimated PA systolic pressure is 64 mmHg.     5 - Less evidence for LVH from Echo in 11/2014.     In 5/2017, bothered by recurrent PAF with AVILA after 10 feet walking. Felt better before on Flecainide 100 mg bid, but Holter continued to show PAF. Attempt up titration, problem with itching, switched to propafenone 150 mg tid, continued with itching and reviewed by allergist, treated with 10 days of cortisone with benefit. No hive and no itching, just don't feel good due to the irregular rhythm. History reviewed, had 2 prior AF ablation, last in [place], [place] in around 2000, recall being told not to do again. Recall seeing an [facility] EP doc but didn't like him. Discussed various options. Will stop antiarrhythmic for now, will be going on a 16 days trip to [place]. Reviewed prior medications, have taken Tenormin, metoprolol tartrate, and short-acting diltiazem in the past without problem. Refer to Dr. Calderon for review. ECG in AF, rate 99, PRWP, LAFB    In 11/2017, post AF ablation, felt good for a few weeks, noted some SOB and had review with Dr. Calderon on 11/1 - 72 year old female with a longstanding history of atrial arrhythmias. Her [id number] Score is 5 (age, female, TIA, HTN) so she should remain on anticoagulation indefinitely. She has failed Flecainide. She is now 6 weeks post-PVI and MA flutter line, and maintaining sinus rhythm on low-dose Amiodarone. Given her intermittent AVILA which started post-ablation, I have stopped Amiodarone which I think is the likely culprit. I instructed her to continue taking Lasix PRN, as well as metoprolol and Xarelto." ECG on 11/1 was in NSR, rate 61, " "PRWP.  Recommended to stop amiodarone but then started to feel the palpitation daily, felt nervous and at the same time being taper down on the nightly Ativan. Did have some breakthrough anxiety attacks. Also had strained the upper back and right arm / shoulder, requesting some Tramadol, tried Jan's Tramadol with good relief. Understand this is an opoid. Used Excedrin with caffeine and bothered by more palpitations.    In 3/2018, here for recurrent palpitations, no inciting factors over the past 6 weeks. Had review with Dr. Calderon in 2/2018 - "72 year old female with a longstanding history of atrial arrhythmias and prior ablations at outside institutions. Her [id number] Score is 5 (age, female, TIA, HTN) so she should remain on anticoagulation indefinitely. She is now 5 months post-PVI and MA flutter line, and maintaining sinus rhythm off Amiodarone. The plan is to continue Xarelto, and to see me again in 6 months." Palpitations appears associated with AVILA, had difficulties walking in house or up a ramp. Started 100 mg of amiodarone last week, daily, feels some benefit. ECG today in Sinus rhythm vs wandering atrial pacemaker with frequent PJC, rate 59. Also taking Losartan in the morning appears more effective.  Holter 11/2017 - PREDOMINANT RHYTHM  1. Sinus arrhythmia with heart rates varying between 39 and 102 bpm with an average of 58 bpm.     VENTRICULAR ARRHYTHMIAS  1. There were occasional PVCs totalling 728 and averaging 15 per hour.  There were 5 bigeminal cycles.     2. There were no episodes of ventricular tachycardia.    SUPRA VENTRICULAR ARRHYTHMIAS  1. There was no supraventricular ectopic activity.    SINUS NODE FUNCTION  1. There was no evidence of high grade SA khai block.     AV CONDUCTION  1. There was no evidence of high grade AV block.     2. The longest RR interval was 1820 msec.     DIARY  1. The diary was not returned    MISCELLANEOUS  1. There were occasional hookup related artifacts. " "Overall, the study was of good quality.     2. This was a tape of adequate length (48 hrs).     in 5/2018, no new problem, still concern of AVILA, usually with walking, variable as little 10 feet or fine with some long walk, can play flute for an hour but find difficulties in holding a note. Off daily amiodarone, uses it prn for palpitation, find helpful. Labs reviewed from 1/2018, TSH, Vitamin D, LDL 68.2, A1C 5.9%, CMP - normal renal function, K+ 3.6. To go to [place] for a month in 8/2018.    In 9/2018, return from a month family reunion trip to [place]. Problem with hypotension with Tramadol and Losartan. Had review with Dr. Calderon on 9/5 "Ayla Dela Cruz is a 73 year old female with a longstanding history of atrial arrhythmias and prior ablations at outside institutions. Her [id number] Score is 5 (age, female, TIA, HTN) so she should remain on anticoagulation indefinitely. She is now 11 months post-PVI and MA flutter line, and maintaining sinus rhythm off Amiodarone. However, she is feeling poorly, with frequent PACs and borderline hypotension. The plan is to stop Losartan, echo in next several weeks, Holter monitor in 6 weeks, and follow-up with me in 8 weeks."  Off both medication on 8/31, no problem now. No heart worry. Denies any CP nor SOB. Still teaching. ECG on 9/5 shows NSR with sinus arrhythmia.    HPI comments: in 3/2019, return for follow up, had syncopal spell with hypotension at [employer] required reconstruction of the right ankle, now in PT, doing well. No heart complication, no AF. LDL 90.6 in 1/2019. 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 No heart worries, no CP nor SOB, much better, breathing improved with daily Breo use. Discussed option with NOAC.    ROS    Constitutional: negative for chills, fatigue, fevers, sweats, no further slurred speech, and no dysarthria, appetite improved, intolerance to heat, bruises easily on NOAC and steroid, weight stable since " "3/2018.  Eyes: negative for icterus, redness and visual disturbance, have visual halo, no more bleed in the right Iris  Respiratory: negative for asthma, cough have resolved off ACE-I, have dyspnea on exertion, occasional SOB with HR 85 to 100 bpm, have lifelong snoring, Paterson score 7 improved down to 5, no hemoptysis, pleurisy/chest pain and wheezing  Cardiovascular: negative for chest pain, chest pressure/discomfort, no further orthostatic dizziness, and no palpitations, no paroxysmal nocturnal dyspnea and syncope  Gastrointestinal: negative for abdominal pain, nausea and vomiting, have GERD  Musculoskeletal:positive for arthralgias, and less right sided muscle weakness with improvement in leg strength, improved bilateral ankle pains - OA and no myalgias  Neurological: negative for coordination problems, dizziness, gait problems, headaches, paresthesia, have some tremors but able to draw and no vertigo  Psych: positive for depression, nervousness, anxiety, less stressed due to 's have improved    Objective:    Physical Exam    General appearance: alert, appears stated age, cooperative and no distress, decrease skin turgor.  Head: Normocephalic, without obvious abnormality, atraumatic  Eyes:  conjunctivae/corneas clear. PERRL, EOM's intact.    Neck: no adenopathy, no carotid bruit, no JVD, supple, symmetrical, trachea midline and thyroid not enlarged, symmetric, no tenderness/mass/nodules  Lungs:  clear to auscultation bilaterally  Chest wall: no tenderness  Heart: regular rate and rhythm, normal S1, S2 normal, occasional grade 2/6 systolic murmur, click, rub or gallop    Abdomen: soft, non-tender; bowel sounds normal; no masses,  no organomegaly, waist 31" hip 42"  Extremities: extremities no further weakness of the right upper extremity, atraumatic, no cyanosis and have no edema, minor varicose veins  Pulses: 2+ and symmetric    Assessment:       1. Paroxysmal atrial fibrillation, ablations X 3 1995, " 1997, 9/2017, CHADS-VAS score 5, HAS-BLED score 5     2. Palpitations    3. S/P ablation of atrial fibrillation    4. S/P ablation of atrial flutter    5. TIA (transient ischemic attack), 11/3/2014    6. Hypertensive left ventricular hypertrophy, without heart failure    7. Pure hypercholesterolemia    8. Anticoagulant long-term use    9. Hypoalbuminemia         Plan:       Ayla was seen today for follow-up.    Diagnoses and associated orders for this visit:    Paroxysmal atrial fibrillation, ablations X 3 1995, 1997, 9/2017, CHADS-VAS score 5, HAS-BLED score 5   -     apixaban (ELIQUIS) 5 mg Tab; Take 1 tablet (5 mg total) by mouth 2 (two) times daily.  Dispense: 60 tablet; Refill: 11    Palpitations  -     EKG 12-lead    S/P ablation of atrial fibrillation    S/P ablation of atrial flutter    TIA (transient ischemic attack), 11/3/2014    Hypertensive left ventricular hypertrophy, without heart failure    Pure hypercholesterolemia    Anticoagulant long-term use  -     apixaban (ELIQUIS) 5 mg Tab; Take 1 tablet (5 mg total) by mouth 2 (two) times daily.  Dispense: 60 tablet; Refill: 11    Hypoalbuminemia      - All medical issues reviewed, continue current Rx.  - CV status stable, off antiarrhythmic, all medications reviewed, patient acknowledge good understanding.  - Recommend using Tylenol as first line pain medications.  - Instruction for Mediterranean diet and heart healthy exercise given.  - Need good exercise program, 4 key elements: 1. Aerobic (walking, swimming, dancing, jogging, biking, etc, 2. Muscle strengthening / resistance exercise, need to do 2-3 times weekly, 3. Stretching daily, good stretch takes a whole  total minute. 4. Balance exercise daily.  - Highly recommend 30 minutes of exercise daily, can have Sunday off, with 2-3 sessions of muscle strengthening weekly. A  would be very helpful.  - Recommend at least biannual cardiovascular evaluation in view of her significant risk  factors, patient 's preference      Chronic pruritus    Depression - improved    Stress at home - improved  - Consider Yoga, Gilberto Chi, or meditation    Elevated brain natriuretic peptide (BNP) level, range 98 - 1045 - improved     Pulmonary hypertension, with right sided congestive heart failure, acute    Chest pain on exertion - resolved    Peripheral edema - imporved    Anxiety - imporved    Patient Active Problem List   Diagnosis    Paroxysmal atrial fibrillation, ablations X 3 1995, 1997, 9/2017, CHADS-VAS score 5, HAS-BLED score 5     Hypertension, 1985    Hyperlipidemia, baseline     GERD (gastroesophageal reflux disease)    Glaucoma (increased eye pressure)    Fibroid uterus    Thickened endometrium    Abnormal CT scan, chest    Interstitial lung disease    H/O: CVA (cerebrovascular accident), June 2014    LVH (left ventricular hypertrophy) due to hypertensive disease    Cardiomegaly    BMI 24.8 decreased to 20.9    Depression    AVILA (dyspnea on exertion)    VPC (ventricular premature complex)    OA (osteoarthritis)    Elevated brain natriuretic peptide (BNP) level, range 98 - 1045    Microalbuminuria    Hypoalbuminemia    TIA (transient ischemic attack), 11/3/2014    Statin intolerance    Chronic pruritus, onset 3/2015    Hypotension due to drugs    Cancer screening    Reflux esophagitis    Colon cancer screening    Atrial flutter    S/P ablation of atrial fibrillation    S/P ablation of atrial flutter    JOSE (generalized anxiety disorder)    Muscle strain    Cervical radiculitis    Other spondylosis, lumbar region    Premature junctional contractions    Heart palpitations    Cervical spondylosis    Lumbar radiculitis    Fracture of right ankle, lateral malleolus    Mild persistent asthma without complication    Anticoagulant long-term use    Other spondylosis, cervical region     Total face-to-face time with the patient was 30 minutes and greater than 50% was  spent in counseling and coordination of care. The above assessment and plan have been discussed at length. Labs and procedure over the last 6 months reviewed. Problem List updated. Asked to bring in all active medications / pills bottles with next visit.

## 2019-09-03 NOTE — PROGRESS NOTES
Patient ID:  Ayla Dela Cruz is a 74 y.o. female who presents for e of 6 month f/u      Health literacy:  medium  Activities: house work  Nicotine: never  Alcohol: rarely wine  Illicit drugs: no  Cardiac symptoms: none  Home BP:occasional  Medication compliance: yes  Diet: regular, diabetic, Mediterranean regular  Caffeine: no  Labs:   Last Echo: 17  Last stress test: 14  Cardiovascular angiogram:   EC18  Fundoscopic exam:     EPWORTH SLEEPINESS SCALE 2015   Sitting and reading 0   Watching TV 0   Sitting, inactive in a public place (e.g. a theatre or a meeting) 0   As a passenger in a car for an hour without a break 0   Lying down to rest in the afternoon when circumstances permit 0   Sitting and talking to someone 0   Sitting quietly after a lunch without alcohol 0   In a car, while stopped for a few minutes in traffic 0   Total score 0         HPI    ROS     Objective:    Physical Exam      Assessment:       1. Palpitations         Plan:         Palpitations  -     EKG 12-lead

## 2019-09-03 NOTE — H&P (VIEW-ONLY)
Subjective:    Patient ID:  Ayla Dela Cruz is a 74 y.o. female who presents for evaluation of 6 month f/u  For PAF, AVILA, post AF - ablation, LVH, chronic diastolic HF, medication intolerance  PCP: Dr. Olivo, see annually, do labs  Orthopedic: Dr. Aguero  Surgeon: Dr. Gonsalez, and Dr. Dc  Rheumatologist: Dr. Pak  Prior cardiologist: Dr. Gibson, last seen over a year  Pulmonary: Dr. White  Psychologist: Nu Fermin, AGUILAR, in [place]  Gyn: Dr. Jordan  GI: Dr. Schultz  Neurologist: Dr. Ramirez  Dermatologist: Dr. Zavaleta, [place]  Pain: Dr. Clancy, injections to neck and both hips very helpful  Lives with , Jan, here with patient, non-smoker, history of prostate CA,  52 years on 6/24  daughter, Lyric, source of stress  Restarted , now 2-4 times weekly, still enjoys it, playing the flute    Health literacy: high  Activities: house work, some walking, do not feel limited  Nicotine: never  Alcohol: rarely wine  Illicit drugs: no  Cardiac symptoms: none  Home BP: occasional,  to 120  Medication compliance: yes  Diet: regular  Caffeine: no  Labs: 1/2019 LDL 90.6 on Crestor 40 mg, normal TSH, CMP (albumin 3.1), normal CBC, Vit. D  Last Echo: 09/07/17 FELICIA  Last stress test: 07/30/14, Lexiscan  Cardiovascular angiogram: none  ECG: AF rate 76, QTc 486 msec  Fundoscopic exam: biannually, no retinopathy, being treated for glaucoma.    In 6/2014:  Hospitalized 6/3/2014 for acute right sided weakness and slurred speech  DCS - Ayla Dela Cruz is a 69 y.o. female admitted to the services of hospital medicine via the ER for acute CVA. Presented with difficulty speaking, facial drooping and weakness of the right upper and lower extremities. She missed the time window for tPA. ST/PT/OT as well as cardiology and neurology were consulted. Dr. Jurado of cardiology managed A fib. Patient rapidly improved functioning with PT/OT/ST. Currently her goals with PT are met as she is ambulating over  400 feet independently.  She was not approved for IP rehab and refuses SNF. She will be discharged home with outpatient PT/ST/OT evaluation and treatment.    Neurocardio work up  CT head - Mild involutional changes and findings suggesting mild microvascular ischemic change with no acute intracranial findings    Carotid US - No hemodynamically significant stenosis is noted by velocity criteria involving either internal carotid artery.  A hemodynamically significant stenosis is defined as a stenosis of greater than 50% of the internal carotid artery vessel lumen    ECHO, 6/4/2014, CONCLUSIONS     1 - Concentric hypertrophy. Wall thickness is mildly increased, with the septum and the posterior wall each measuring 1.1 cm across.    2 - Normal left ventricular systolic function (EF 60-65%).     3 - Mild mitral regurgitation.     4 - Left ventricular diastolic dysfunction.     5 - Pulmonary hypertension. estimated PA systolic pressure is 64 mmHg.    6 - Normal right ventricular systolic function .     7 - Suggestion for increase in LV mass from echo on 3/18/2014..     Echo bubble study also negative. Had episode of PAF during monitoring and convert with restart of Flecainide.   Since DC, improving strength in the right hand, right leg feels normal but tire easier. Speech improving. To receive PT/OT/speech today.  Medications reviewed - will DC ASA for now, and know the effects of the Limbitrol and Ativan, uses prn rarely. Some loss of appetite and taste.    Active problems in hospital  Acute left hemispheric CVA, MRI suggest multiple areas, was not on OAC nor antiplatelet Rx  PAF with high CHADS-VAS score, post ablations and DCCs  HTN with LVH  Interstitial lung disease  Pulmonary hypertension  Hyperlipidemia was not on statin    Since visit of 6/20, problem with peripheral swelling, now taking Lasix daily. Last hospitalization / CMP, 6/3 showed K+ 3.4 with normal renal function. Also noted AVILA along with exertional chest  heaviness, on-and-off over past several years. Noted it with walking and have to rest. Can last up to an hour. Max grade 10/10, yesterday during the day.  in hospital. Also quite anxious, stopped Limbitrol due to side effects, managed by Dr. Olivo. Quite sedentary and major decrease in appetite, due to depression. No Psychiatrist. Home BP high 175/97. ECG shows NSR, rate 61, right axis, nonspecific T waves abnormalities, prolong QTc 483 msec. Low anterior forces.    Holter, 6/30/2014:  PREDOMINANT RHYTHM  1. Sinus rhythm with heart rates varying between 43 and 67 bpm with an average of 55 bpm.     VENTRICULAR ARRHYTHMIAS  1. There were frequent polymorphic PVCs totalling 1388 and averaging 40 per hour.  There was 1 couplet.    2. There were no episodes of ventricular tachycardia.    SUPRA VENTRICULAR ARRHYTHMIAS  1. There were very rare PACs totalling 47 and averaging 1 per hour.     2. There were no episodes of sustained supraventricular tachycardia.    SINUS NODE FUNCTION  1. There was no evidence of high grade SA khai block.     AV CONDUCTION  1. There was no evidence of high grade AV block.     DIARY  1. The diary was returned, but not completed    MISCELLANEOUS  1. This was a tape of adequate length (34 hrs).     Since visit of 7/24, better, reviewed by Dr. Olivo and started antidepressant Lexapro. BMP still showed mild hypokalemia of 3.3, not using Lasix at this time. Still feeling fatigue, anxiety, and request refill for Nexium. Discussed need for GI review on chronic PPI. Lack of appetite.  Lexiscan, 7/30/2014, - Nuclear Quantitative Functional Analysis:   LVEF: 66 % (normal is 55 - 69)  LVED Volume: 50 ml (normal is 60 - 98)  LVES Volume: 17 ml (normal is 20 - 42)    Impression: NORMAL MYOCARDIAL PERFUSION  1. The perfusion scan is free of evidence for myocardial ischemia or injury.   2. There is a mild intensity fixed defect in the anteroseptal wall of the left ventricle, secondary to breast  "attenuation.   3. Resting wall motion is physiologic.   4. Resting LV function is normal.  (normal is 55 - 69)  5. The ventricular volumes are normal at rest and stress.   6. The extracardiac distribution of radioactivity is normal.     No problem with spironolactone, BP improved, home BP similar to office readings.    Since visit of 8/14, had some distress and home monitor showed HR of 40, felt "bad" and nervous to ED, note reviewed, ECG and final pulse count 57-58. Labs reviewed - normal renal function and K+ 3.8. Still taking one tab of K+ daily. Not using Lasix. Explained HR discrepancy may be due to VPCs. Reviewed by Ms. Fermin and Lexapro increased to 20 mg, 2 days ago. Feeling "a lot better". Sleeping better. Still sedentary but ready to be more active. Eating better have lost additional 5 lbs since 8/2014.    Since visit of 9/5, still with speech therapy, noted progressive near syncope with SOB and irregular heart beats. Also noted some ankle swelling over the past 2 weeks. ECG shows marked bradycardia, rate 48, right axis, pulmonary pattern, 1st degree AVB. Wellbutrin 100 mg daily along with Lexapro 20 mg by Ms. Fermin NP. BMP showed K+3.5. To use Lasix PRN    Since visit of 9/25, still with marked AVILA, only able to walk about 30' before having to stop. Bothered by increase palpitations during tapering of BB. No definite lung problem, evaluated this year. ECG shows sinus dewayne, rate 53, VPC, RBBB with suggestion of septal MI. No evidence for anemia - CBC 9/3/2014 was normal. Just started doing some activities with arm and leg exercise for the last 2 weeks, Doing some OT alternating with PT every other day.  CXR, 6/3/2014 - Lungs are clear of infiltrate and costophrenic sulci are sharp.  The cardiomediastinal silhouette appears stable.    PET scan, 4/2014 - 1.A hypermetabolic left pulmonary mass is noted with an SUV of 3.0 maximally.  A neoplastic, infectious, or granulomatous process is on the differential. " "Clinical correlation is suggested.  Close follow-up for resolution or biopsy would be suggested    2.  Elevated right-sided heart pressures are suspected with a large right atrium    3.  Right-sided pleural effusion    4.  Hypermetabolic thyroid gland asymmetrically on the right.  This may relate to thyroiditis, Graves' disease, or neoplastic process.  Ultrasound may be helpful.    5.  Small hypermetabolic focus in the expected location of the left ovary, a female pelvis ultrasound may be helpful for further evaluation.  This could relate to a uterine fibroid that has necrosed or infarcted    Biopsy of lung nodule on 5/1/2014 - negative for CA    CTA, 4/2014 - No evidence of pulmonary thromboembolism.    2.  Enlarged cardiac silhouette and secondary findings of elevated right heart pressures with diffuse mosaic lung pattern and right basilar pleural fluid suggesting cardiac decompensation/heart failure.    3. Unchanged 1 cm nodular density within the left upper lobe which previously demonstrated hypermetabolism by PET/CT and unchanged mediastinal lymphadenopathy.    4.  Subpleural nodular density within the right upper lobe is unchanged when compared with the previous study and could represent an area of remote fibrotic scarring.    5.  Heterogeneous appearance of the spleen, possibly due to the phase of contrast enhancement.  Evolving splenic infarction is not entirely excluded.    6. Suggestion of colonic wall thickening involving the descending colon.  Please correlate for symptoms of colitis.    Since visit of 10/14, rehospitalized for HF on 10/26 to 10/29/2014.  DCS - "Ayla Dela Cruz is a 69 y.o. female admitted to the services of hospital medicine via the ER for acute diastolic heart failure. C/o severe SOB and increase in leg swelling over the past two days. Under the care of Dr. Jurado. Recently was taken off metoprolol. History of paroxsymal atrial fib. Hospitalized here in June in this year for acute CVA. " "It was at that time, pt started Xarelto. Deficits has resolved. No history of heart failure.     Hospital Course: The patient was admitted with acute CHF biventricular and mild elevation of her troponin's at 0.029 - 0.035 and therefore an anginal equivalent was suspected. The patient also had significant hypertension upon admission and although she was not in atrial fibrillation, her EKG showed a significant first degree AV block and RBBB. Discussion was made as to whether or not to decrease the patient flecainide; however due to the risk of her going back into atrial fibrillation this was not done. Upon discharge the patient's lisinopril was increased to 10 mg and aldactone was added."    RHC done HEMODYNAMIC RESULTS:    LVEDP (Pre): 24 mmHg  BP: 166/104    Air rest:  PA: 90/34 (54)  PW:  (24)  RVEDP: 16  RA:  (16)    C. SUMMARY:    1. Diastolic dysfunction.  2. - Kee CO 2.64 L/min  3. - Mean systemic pressure 108.6 mmHG, stage II HTN  4. - SVR 41.16 Wood Units  5. - PVR 11.36 Wood Units  6. - Pulmonary HTN due to left sided heart disease and stage II HTN    D. RECOMMENDATIONS:    1. Routine post-cath care.  2. Cardiac rehab referral.  3. Follow up with Dr. Barrett Jurado.  4. - Aggressive BP lowering and diuresis    Repeat ECHO 11/5/2014 CONCLUSIONS     1 - Concentric remodeling. Septal wall thickness is increased, and posterior wall thickness is mildly increased, with the septum measuring 1.3 cm and the posterior wall measuring 1.1 cm across.    2 - Mildly to moderately depressed left ventricular systolic function (EF 40-45%).     3 - Left ventricular diastolic dysfunction. E/e'(lat) is 16    4 - Right ventricular enlargement with mildly depressed systolic function.     5 - Pulmonary hypertension. estimated PA systolic pressure is 56 mmHg.     6 - Intermediate central venous pressure.     7 - No evidence of intracardiac shunt.     8 - No significant change from Echo of 6/4/2014.    Active problems:  Progressive " "severe SOB, functional class IV  Diastolic dysfunction, elevated BNP and Troponin  Hypokalemia due to diuretics  Secondary Pulmonary HTN with peripheral edema  HTN  History of CVA 6/3/2014  PAF controlled with Flecanide  Marked bradycardia with BB  On OAC  Hematuria    At home receiving PT, OT and speech Rx twice a week, with  nurse once a week, no problem with medications. Feeling better, sleeping well. Home /73.    Since visit of 11/14, feeling "much better", concern of occasional HTN, home BP /66-99, HR . Lot more active, no problem. Not using walker, no CP, SOB, nor dizziness. Recent BMP - normal renal function and K+ 4.1.    Since visit of 12/19/2014, worry about HTN home readings similar to office up to . Morning reading is higher. No HA nor visual abnormalities. Xanax has helped but afraid to use too much. ECG shows sinus arrhythmia, rate of 65, late transition and LAFB. Also bothered with dry cough with the Lisinopril. And started to have return of arm pains that was attributed to atorvastatin, on Crestor. Discussed options.     Since visit of 1/16/2015, noted frequent nocturia, 8-10 times, taking losartan-HCT at night time. Have stopped using Lasix and spironolactone for past 2 months. More active without much problem. Labs normal renal function, K+ 4.2, BNP now 37, A1C 5.6%.    Since visit of 2/18/2015, improving, no further dizziness, some SOB when "stressed", usually helped with alprazolam, use only prn, about 3-6 times weekly. Compliant with medications. Been off Crestor for 6 weeks with concern of muscle problem. Home BP is fine, similar to office.     Since visit of 4/15, appetite returned, feeling a lot better. Active, walking twice a week for about half an hour. Also do calisthenic about twice a week, no problem. Seeing Dr. Zavaleta for generalized pruritis for past 3 months. Cardiac wise less AVILA. Sleeping well. Labs in 5/2015, normal CBC without eosinophilia, thyroid " "normal, ferritin level low normal at 21. Off Crestor for couple months due to fear of muscle pain but did not find much difference being off medication. Last Lipid in 11/2014 was good, on daily dose of Crestor. Home /79.    Since visit of 7/2/2015, feeling "great", very active without problem, no more AVILA nor dizziness with standing. Compliant with medications, don't miss. Using Tylenol 500 mg BID for arthritis. Notice easy bruising on Xarelto. Home /10.  Holter - PREDOMINANT RHYTHM  1. Sinus arrhythmia with heart rates varying between 46 and 110 bpm with an average of 73 bpm.     VENTRICULAR ARRHYTHMIAS  1. There were very rare PVCs recorded totalling 8 and averaging less than 1 per hour.     2. There were no episodes of ventricular tachycardia.    SUPRA VENTRICULAR ARRHYTHMIAS  1. There were very frequent PACs totalling 3054 and averaging 132 per hour.  There were 29 bigeminal cycles.  There were 103 couplets.    2. 3% of complexes.    3. There were no episodes of sustained supraventricular tachycardia.    SINUS NODE FUNCTION  1. There was no evidence of high grade SA khai block.     AV CONDUCTION  1. There was no evidence of high grade AV block.     2. The longest RR interval was 1565 msec.     DIARY  1. The diary was properly completed.     2. There was 1 episode of skipping beats reported. The corresponding rhythm strips revealed the following:             During event 1 the rhythm was sinus rhythm at 75 bpm.     3. There was 1 episode of skipped beat reported. The corresponding rhythm strips revealed the following:             During event 1 the rhythm was sinus arrhythmia at 63 bpm.     MISCELLANEOUS  1. This was a tape of adequate length (23 hrs).     Since visit of 10/15, place on new antidepressant, Venlafaxine 75 mg daily, tried 2 and developed rapid HR to 125 bpm, feels more anxious, now switched to Citalopram. Also noted the daily dose of Lorazepam does not seem adequate. Orthostatic VS is " "positive with lying 142/73, , standing 120/62, . Been taking spironolactone 25 mg for last 3 days. Does feel thirsty. Labs reviewed A1C 5.8%, CBC, BMP were WNL. Lipid shows LDL 99, non-. Goal preferred is <70 and < 100. No problem with 10 mg of Crestor. Had vacation at [place] and [place], walked all over without problem.    Since visit of 1/18/2016, no new problem. 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 Labs with , non-, hsCRP at 2.56.     In 10/2016, had acute GB attack with rupture in 8/2016, then tried on Wellbutrin took only 1-2 tabs and BP up to 201/100 with head tightness. Subsequently back to normal, the last 2.5 days took Losartan bid. Discussed Rx options for HTN. Advised to be more active, regular exercise. Lab reviewed LDL in 8/2016 93.     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 Still with daily palpitations, last up to half hours. Have electronic watch, David Barroso, since 9/2016, suppose harmonize patient with the universe. Feeling better if on during the day. Lot of walking at school, no problem.   Holter in 10/2016 - PREDOMINANT RHYTHM  1. Sinus rhythm with heart rates varying between 52 and 144 bpm with an average of 75 bpm.     2. Paroxysmal atrial fibrillation    VENTRICULAR ARRHYTHMIAS  1. There were occasional mostly monomorphic PVCs totalling 596 and averaging 13 per hour.     2. There were no episodes of ventricular tachycardia.    SUPRA VENTRICULAR ARRHYTHMIAS  1. There were frequent PACs totalling 2982 and averaging 69 per hour.  There were 69 bigeminal cycles.  There were 55 couplets.    2. There were no episodes of sustained supraventricular tachycardia.    3. Paroxysmal atrial fibrillation was noted in the the study.     SINUS NODE FUNCTION  1. There was no evidence of high grade SA khai block.     AV CONDUCTION  1. There was no evidence of high grade AV khai filtering.     2. The longest RR interval was 1725 msec. " "    DIARY  1. The diary was returned, but not completed    MISCELLANEOUS  1. This was a tape of adequate length (43 hrs).     ECHO CONCLUSIONS     1 - Hyperdynamic left ventricular systolic function (EF 65-70%).     2 - Normal left ventricular diastolic function.     3 - Normal right ventricular systolic function .     4 - Pulmonary hypertension. The estimated PA systolic pressure is 64 mmHg.     5 - Less evidence for LVH from Echo in 11/2014.     In 5/2017, bothered by recurrent PAF with AVILA after 10 feet walking. Felt better before on Flecainide 100 mg bid, but Holter continued to show PAF. Attempt up titration, problem with itching, switched to propafenone 150 mg tid, continued with itching and reviewed by allergist, treated with 10 days of cortisone with benefit. No hive and no itching, just don't feel good due to the irregular rhythm. History reviewed, had 2 prior AF ablation, last in [place], [place] in around 2000, recall being told not to do again. Recall seeing an [facility] EP doc but didn't like him. Discussed various options. Will stop antiarrhythmic for now, will be going on a 16 days trip to [place]. Reviewed prior medications, have taken Tenormin, metoprolol tartrate, and short-acting diltiazem in the past without problem. Refer to Dr. Calderon for review. ECG in AF, rate 99, PRWP, LAFB    In 11/2017, post AF ablation, felt good for a few weeks, noted some SOB and had review with Dr. Calderon on 11/1 - 72 year old female with a longstanding history of atrial arrhythmias. Her [id number] Score is 5 (age, female, TIA, HTN) so she should remain on anticoagulation indefinitely. She has failed Flecainide. She is now 6 weeks post-PVI and MA flutter line, and maintaining sinus rhythm on low-dose Amiodarone. Given her intermittent AVILA which started post-ablation, I have stopped Amiodarone which I think is the likely culprit. I instructed her to continue taking Lasix PRN, as well as metoprolol and Xarelto." ECG on 11/1 " "was in NSR, rate 61, PRWP.  Recommended to stop amiodarone but then started to feel the palpitation daily, felt nervous and at the same time being taper down on the nightly Ativan. Did have some breakthrough anxiety attacks. Also had strained the upper back and right arm / shoulder, requesting some Tramadol, tried Jan's Tramadol with good relief. Understand this is an opoid. Used Excedrin with caffeine and bothered by more palpitations.    In 3/2018, here for recurrent palpitations, no inciting factors over the past 6 weeks. Had review with Dr. Calderon in 2/2018 - "72 year old female with a longstanding history of atrial arrhythmias and prior ablations at outside institutions. Her [id number] Score is 5 (age, female, TIA, HTN) so she should remain on anticoagulation indefinitely. She is now 5 months post-PVI and MA flutter line, and maintaining sinus rhythm off Amiodarone. The plan is to continue Xarelto, and to see me again in 6 months." Palpitations appears associated with AVILA, had difficulties walking in house or up a ramp. Started 100 mg of amiodarone last week, daily, feels some benefit. ECG today in Sinus rhythm vs wandering atrial pacemaker with frequent PJC, rate 59. Also taking Losartan in the morning appears more effective.  Holter 11/2017 - PREDOMINANT RHYTHM  1. Sinus arrhythmia with heart rates varying between 39 and 102 bpm with an average of 58 bpm.     VENTRICULAR ARRHYTHMIAS  1. There were occasional PVCs totalling 728 and averaging 15 per hour.  There were 5 bigeminal cycles.     2. There were no episodes of ventricular tachycardia.    SUPRA VENTRICULAR ARRHYTHMIAS  1. There was no supraventricular ectopic activity.    SINUS NODE FUNCTION  1. There was no evidence of high grade SA khai block.     AV CONDUCTION  1. There was no evidence of high grade AV block.     2. The longest RR interval was 1820 msec.     DIARY  1. The diary was not returned    MISCELLANEOUS  1. There were occasional hookup " "related artifacts. Overall, the study was of good quality.     2. This was a tape of adequate length (48 hrs).     in 5/2018, no new problem, still concern of AVILA, usually with walking, variable as little 10 feet or fine with some long walk, can play flute for an hour but find difficulties in holding a note. Off daily amiodarone, uses it prn for palpitation, find helpful. Labs reviewed from 1/2018, TSH, Vitamin D, LDL 68.2, A1C 5.9%, CMP - normal renal function, K+ 3.6. To go to [place] for a month in 8/2018.    In 9/2018, return from a month family reunion trip to [place]. Problem with hypotension with Tramadol and Losartan. Had review with Dr. Calderon on 9/5 "Ayla Dela Cruz is a 73 year old female with a longstanding history of atrial arrhythmias and prior ablations at outside institutions. Her [id number] Score is 5 (age, female, TIA, HTN) so she should remain on anticoagulation indefinitely. She is now 11 months post-PVI and MA flutter line, and maintaining sinus rhythm off Amiodarone. However, she is feeling poorly, with frequent PACs and borderline hypotension. The plan is to stop Losartan, echo in next several weeks, Holter monitor in 6 weeks, and follow-up with me in 8 weeks."  Off both medication on 8/31, no problem now. No heart worry. Denies any CP nor SOB. Still teaching. ECG on 9/5 shows NSR with sinus arrhythmia.    In 3/2019, return for follow up, had syncopal spell with hypotension at [employer] required reconstruction of the right ankle, now in PT, doing well. No heart complication, no AF. LDL 90.6 in 1/2019. 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 No heart worries, no CP nor SOB, much better, breathing improved with daily Breo use. Discussed option with NOAC.    HPI comments: in 9/2019, 6 months review, concern of bruising with Xarelto, recall problem with dark stool with Eliquis. Discussed option.     ROS    Constitutional: negative for chills, fatigue, fevers, sweats, no " "further slurred speech, and no dysarthria, appetite improved, intolerance to heat, bruises easily on NOAC and steroid, weight stable since 3/2018.  Eyes: negative for icterus, redness and visual disturbance, have visual halo, no more bleed in the right Iris  Respiratory: negative for asthma, cough have resolved off ACE-I, have dyspnea on exertion, occasional SOB with HR 85 to 100 bpm, have lifelong snoring, [place] score 7 improved down to 3, no hemoptysis, pleurisy/chest pain and wheezing  Cardiovascular: negative for chest pain, chest pressure/discomfort, no further orthostatic dizziness, and no palpitations, no paroxysmal nocturnal dyspnea and syncope  Gastrointestinal: negative for abdominal pain, nausea and vomiting, have GERD  Musculoskeletal:positive for arthralgias, and less right sided muscle weakness with improvement in leg strength, improved bilateral ankle pains - OA and no myalgias  Neurological: negative for coordination problems, dizziness, gait problems, headaches, paresthesia, have some tremors but able to draw and no vertigo  Psych: positive for depression, nervousness, anxiety, less stressed due to 's have improved    Objective:    Physical Exam    General appearance: alert, appears stated age, cooperative and no distress, decrease skin turgor.  Head: Normocephalic, without obvious abnormality, atraumatic  Eyes:  conjunctivae/corneas clear. PERRL, EOM's intact.    Neck: no adenopathy, no carotid bruit, no JVD, supple, symmetrical, trachea midline and thyroid not enlarged, symmetric, no tenderness/mass/nodules  Lungs:  clear to auscultation bilaterally  Chest wall: no tenderness  Heart: regular rate and rhythm, normal S1, S2 normal, occasional grade 2/6 systolic murmur, click, rub or gallop    Abdomen: soft, non-tender; bowel sounds normal; no masses,  no organomegaly, waist 31" hip 42"  Extremities: extremities no further weakness of the right upper extremity, atraumatic, no cyanosis and " have no edema, minor varicose veins  Pulses: 2+ and symmetric    Assessment:       1. Paroxysmal atrial fibrillation, ablations X 3 1995, 1997, 9/2017, CHADS-VAS score 5, HAS-BLED score 5     2. Palpitations    3. S/P ablation of atrial fibrillation    4. S/P ablation of atrial flutter    5. TIA (transient ischemic attack), 11/3/2014    6. Hypertensive left ventricular hypertrophy, without heart failure    7. Pure hypercholesterolemia    8. Anticoagulant long-term use    9. Hypoalbuminemia         Plan:       Ayla was seen today for follow-up.    Diagnoses and associated orders for this visit:    Paroxysmal atrial fibrillation, ablations X 3 1995, 1997, 9/2017, CHADS-VAS score 5, HAS-BLED score 5   -     apixaban (ELIQUIS) 5 mg Tab; Take 1 tablet (5 mg total) by mouth 2 (two) times daily.  Dispense: 60 tablet; Refill: 11    Palpitations  -     EKG 12-lead    S/P ablation of atrial fibrillation    S/P ablation of atrial flutter    TIA (transient ischemic attack), 11/3/2014    Hypertensive left ventricular hypertrophy, without heart failure    Pure hypercholesterolemia    Anticoagulant long-term use  -     apixaban (ELIQUIS) 5 mg Tab; Take 1 tablet (5 mg total) by mouth 2 (two) times daily.  Dispense: 60 tablet; Refill: 11    Hypoalbuminemia      - All medical issues reviewed, wants to retry Eliquis  - CV status stable, off antiarrhythmic, all medications reviewed, patient acknowledge good understanding.  - Recommend using Tylenol as first line pain medications.  - Instruction for Mediterranean diet and heart healthy exercise given.  - Need good exercise program, 4 key elements: 1. Aerobic (walking, swimming, dancing, jogging, biking, etc, 2. Muscle strengthening / resistance exercise, need to do 2-3 times weekly, 3. Stretching daily, good stretch takes a whole  total minute. 4. Balance exercise daily.  - Highly recommend 30 minutes of exercise daily, can have Sunday off, with 2-3 sessions of muscle strengthening  weekly. A  would be very helpful.  - Recommend at least biannual cardiovascular evaluation in view of her significant risk factors, patient 's preference      Chronic pruritus, on intermittent steroid    Depression - improved    Stress at home - improved  - Consider Yoga, Gilberto Chi, or meditation    Elevated brain natriuretic peptide (BNP) level, range 98 - 1045 - improved     Pulmonary hypertension, with right sided congestive heart failure, acute    Chest pain on exertion - resolved    Peripheral edema - imporved    Anxiety - imporved    Patient Active Problem List   Diagnosis    Paroxysmal atrial fibrillation, ablations X 3 1995, 1997, 9/2017, CHADS-VAS score 5, HAS-BLED score 5     Hypertension, 1985    Hyperlipidemia, baseline     GERD (gastroesophageal reflux disease)    Glaucoma (increased eye pressure)    Fibroid uterus    Thickened endometrium    Abnormal CT scan, chest    Interstitial lung disease    H/O: CVA (cerebrovascular accident), June 2014    LVH (left ventricular hypertrophy) due to hypertensive disease    Cardiomegaly    BMI 24.8 decreased to 20.9    Depression    AVILA (dyspnea on exertion)    VPC (ventricular premature complex)    OA (osteoarthritis)    Elevated brain natriuretic peptide (BNP) level, range 98 - 1045    Microalbuminuria    Hypoalbuminemia    TIA (transient ischemic attack), 11/3/2014    Statin intolerance    Chronic pruritus, onset 3/2015    Hypotension due to drugs    Cancer screening    Reflux esophagitis    Colon cancer screening    Atrial flutter    S/P ablation of atrial fibrillation    S/P ablation of atrial flutter    JOSE (generalized anxiety disorder)    Muscle strain    Cervical radiculitis    Other spondylosis, lumbar region    Premature junctional contractions    Heart palpitations    Cervical spondylosis    Lumbar radiculitis    Fracture of right ankle, lateral malleolus    Mild persistent asthma without  complication    Anticoagulant long-term use    Other spondylosis, cervical region     Total face-to-face time with the patient was 30 minutes and greater than 50% was spent in counseling and coordination of care. The above assessment and plan have been discussed at length. Labs and procedure over the last 6 months reviewed. Problem List updated. Asked to bring in all active medications / pills bottles with next visit.                                                                     Subjective:    Patient ID:  Ayla Dela Cruz is a 74 y.o. female who presents for evaluation of 6 month f/u  For PAF was on amiodarone 100 mg daily , AVILA, post AF - ablation, LVH, chronic diastolic HF, medication intolerance  PCP: Dr. Olivo, see annually, do labs  Orthopedic: Dr. Aguero  Surgeon: Dr. Gonsalez, and Dr. Dc  Rheumatologist: Dr. Pak  Prior cardiologist: Dr. Gibson, last seen over a year  Pulmonary: Dr. White  Psychologist: Nu Fermin, AGUILAR, in [place]  Gyn: Dr. Jordan  GI: Dr. Schultz  Neurologist: Dr. Ramirez  Dermatologist: Dennis Corrigan  Pain: Dr. Clancy, injections to neck and both hips very helpful  Lives with , Jan, here with patient, non-smoker, history of prostate CA,  51 years on 6/24  daughter, Lyric, source of stress  Restarted , now 2-4 times weekly, still enjoys it, playing the flute    In 6/2014:  Hospitalized 6/3/2014 for acute right sided weakness and slurred speech  DCS - Ayla Dela Cruz is a 69 y.o. female admitted to the services of hospital medicine via the ER for acute CVA. Presented with difficulty speaking, facial drooping and weakness of the right upper and lower extremities. She missed the time window for tPA. ST/PT/OT as well as cardiology and neurology were consulted. Dr. Jurado of cardiology managed A fib. Patient rapidly improved functioning with PT/OT/ST. Currently her goals with PT are met as she is ambulating over 400 feet independently.  She  was not approved for IP rehab and refuses SNF. She will be discharged home with outpatient PT/ST/OT evaluation and treatment.    Neurocardio work up  CT head - Mild involutional changes and findings suggesting mild microvascular ischemic change with no acute intracranial findings    Carotid US - No hemodynamically significant stenosis is noted by velocity criteria involving either internal carotid artery.  A hemodynamically significant stenosis is defined as a stenosis of greater than 50% of the internal carotid artery vessel lumen    ECHO, 6/4/2014, CONCLUSIONS     1 - Concentric hypertrophy. Wall thickness is mildly increased, with the septum and the posterior wall each measuring 1.1 cm across.    2 - Normal left ventricular systolic function (EF 60-65%).     3 - Mild mitral regurgitation.     4 - Left ventricular diastolic dysfunction.     5 - Pulmonary hypertension. estimated PA systolic pressure is 64 mmHg.    6 - Normal right ventricular systolic function .     7 - Suggestion for increase in LV mass from echo on 3/18/2014..     Echo bubble study also negative. Had episode of PAF during monitoring and convert with restart of Flecainide.   Since DC, improving strength in the right hand, right leg feels normal but tire easier. Speech improving. To receive PT/OT/speech today.  Medications reviewed - will DC ASA for now, and know the effects of the Limbitrol and Ativan, uses prn rarely. Some loss of appetite and taste.    Active problems in hospital  Acute left hemispheric CVA, MRI suggest multiple areas, was not on OAC nor antiplatelet Rx  PAF with high CHADS-VAS score, post ablations and DCCs  HTN with LVH  Interstitial lung disease  Pulmonary hypertension  Hyperlipidemia was not on statin    Since visit of 6/20, problem with peripheral swelling, now taking Lasix daily. Last hospitalization / CMP, 6/3 showed K+ 3.4 with normal renal function. Also noted AVILA along with exertional chest heaviness, on-and-off over  past several years. Noted it with walking and have to rest. Can last up to an hour. Max grade 10/10, yesterday during the day.  in hospital. Also quite anxious, stopped Limbitrol due to side effects, managed by Dr. Olivo. Quite sedentary and major decrease in appetite, due to depression. No Psychiatrist. Home BP high 175/97. ECG shows NSR, rate 61, right axis, nonspecific T waves abnormalities, prolong QTc 483 msec. Low anterior forces.    Holter, 6/30/2014:  PREDOMINANT RHYTHM  1. Sinus rhythm with heart rates varying between 43 and 67 bpm with an average of 55 bpm.     VENTRICULAR ARRHYTHMIAS  1. There were frequent polymorphic PVCs totalling 1388 and averaging 40 per hour.  There was 1 couplet.    2. There were no episodes of ventricular tachycardia.    SUPRA VENTRICULAR ARRHYTHMIAS  1. There were very rare PACs totalling 47 and averaging 1 per hour.     2. There were no episodes of sustained supraventricular tachycardia.    SINUS NODE FUNCTION  1. There was no evidence of high grade SA khai block.     AV CONDUCTION  1. There was no evidence of high grade AV block.     DIARY  1. The diary was returned, but not completed    MISCELLANEOUS  1. This was a tape of adequate length (34 hrs).     Since visit of 7/24, better, reviewed by Dr. Olivo and started antidepressant Lexapro. BMP still showed mild hypokalemia of 3.3, not using Lasix at this time. Still feeling fatigue, anxiety, and request refill for Nexium. Discussed need for GI review on chronic PPI. Lack of appetite.  Lexiscan, 7/30/2014, - Nuclear Quantitative Functional Analysis:   LVEF: 66 % (normal is 55 - 69)  LVED Volume: 50 ml (normal is 60 - 98)  LVES Volume: 17 ml (normal is 20 - 42)    Impression: NORMAL MYOCARDIAL PERFUSION  1. The perfusion scan is free of evidence for myocardial ischemia or injury.   2. There is a mild intensity fixed defect in the anteroseptal wall of the left ventricle, secondary to breast attenuation.   3. Resting wall  "motion is physiologic.   4. Resting LV function is normal.  (normal is 55 - 69)  5. The ventricular volumes are normal at rest and stress.   6. The extracardiac distribution of radioactivity is normal.     No problem with spironolactone, BP improved, home BP similar to office readings.    Since visit of 8/14, had some distress and home monitor showed HR of 40, felt "bad" and nervous to ED, note reviewed, ECG and final pulse count 57-58. Labs reviewed - normal renal function and K+ 3.8. Still taking one tab of K+ daily. Not using Lasix. Explained HR discrepancy may be due to VPCs. Reviewed by Ms. Fermin and Lexapro increased to 20 mg, 2 days ago. Feeling "a lot better". Sleeping better. Still sedentary but ready to be more active. Eating better have lost additional 5 lbs since 8/2014.    Since visit of 9/5, still with speech therapy, noted progressive near syncope with SOB and irregular heart beats. Also noted some ankle swelling over the past 2 weeks. ECG shows marked bradycardia, rate 48, right axis, pulmonary pattern, 1st degree AVB. Wellbutrin 100 mg daily along with Lexapro 20 mg by Ms. Fermin NP. BMP showed K+3.5. To use Lasix PRN    Since visit of 9/25, still with marked AVILA, only able to walk about 30' before having to stop. Bothered by increase palpitations during tapering of BB. No definite lung problem, evaluated this year. ECG shows sinus dewayne, rate 53, VPC, RBBB with suggestion of septal MI. No evidence for anemia - CBC 9/3/2014 was normal. Just started doing some activities with arm and leg exercise for the last 2 weeks, Doing some OT alternating with PT every other day.  CXR, 6/3/2014 - Lungs are clear of infiltrate and costophrenic sulci are sharp.  The cardiomediastinal silhouette appears stable.    PET scan, 4/2014 - 1.A hypermetabolic left pulmonary mass is noted with an SUV of 3.0 maximally.  A neoplastic, infectious, or granulomatous process is on the differential. Clinical correlation is " "suggested.  Close follow-up for resolution or biopsy would be suggested    2.  Elevated right-sided heart pressures are suspected with a large right atrium    3.  Right-sided pleural effusion    4.  Hypermetabolic thyroid gland asymmetrically on the right.  This may relate to thyroiditis, Graves' disease, or neoplastic process.  Ultrasound may be helpful.    5.  Small hypermetabolic focus in the expected location of the left ovary, a female pelvis ultrasound may be helpful for further evaluation.  This could relate to a uterine fibroid that has necrosed or infarcted    Biopsy of lung nodule on 5/1/2014 - negative for CA    CTA, 4/2014 - No evidence of pulmonary thromboembolism.    2.  Enlarged cardiac silhouette and secondary findings of elevated right heart pressures with diffuse mosaic lung pattern and right basilar pleural fluid suggesting cardiac decompensation/heart failure.    3. Unchanged 1 cm nodular density within the left upper lobe which previously demonstrated hypermetabolism by PET/CT and unchanged mediastinal lymphadenopathy.    4.  Subpleural nodular density within the right upper lobe is unchanged when compared with the previous study and could represent an area of remote fibrotic scarring.    5.  Heterogeneous appearance of the spleen, possibly due to the phase of contrast enhancement.  Evolving splenic infarction is not entirely excluded.    6. Suggestion of colonic wall thickening involving the descending colon.  Please correlate for symptoms of colitis.    Since visit of 10/14, rehospitalized for HF on 10/26 to 10/29/2014.  DCS - "Ayla Dela Cruz is a 69 y.o. female admitted to the services of hospital medicine via the ER for acute diastolic heart failure. C/o severe SOB and increase in leg swelling over the past two days. Under the care of Dr. Jurado. Recently was taken off metoprolol. History of paroxsymal atrial fib. Hospitalized here in June in this year for acute CVA. It was at that time, pt " "started Xarelto. Deficits has resolved. No history of heart failure.     Hospital Course: The patient was admitted with acute CHF biventricular and mild elevation of her troponin's at 0.029 - 0.035 and therefore an anginal equivalent was suspected. The patient also had significant hypertension upon admission and although she was not in atrial fibrillation, her EKG showed a significant first degree AV block and RBBB. Discussion was made as to whether or not to decrease the patient flecainide; however due to the risk of her going back into atrial fibrillation this was not done. Upon discharge the patient's lisinopril was increased to 10 mg and aldactone was added."    RHC done HEMODYNAMIC RESULTS:    LVEDP (Pre): 24 mmHg  BP: 166/104    Air rest:  PA: 90/34 (54)  PW:  (24)  RVEDP: 16  RA:  (16)    C. SUMMARY:    1. Diastolic dysfunction.  2. - Kee CO 2.64 L/min  3. - Mean systemic pressure 108.6 mmHG, stage II HTN  4. - SVR 41.16 Wood Units  5. - PVR 11.36 Wood Units  6. - Pulmonary HTN due to left sided heart disease and stage II HTN    D. RECOMMENDATIONS:    1. Routine post-cath care.  2. Cardiac rehab referral.  3. Follow up with Dr. Barrett Jurado.  4. - Aggressive BP lowering and diuresis    Repeat ECHO 11/5/2014 CONCLUSIONS     1 - Concentric remodeling. Septal wall thickness is increased, and posterior wall thickness is mildly increased, with the septum measuring 1.3 cm and the posterior wall measuring 1.1 cm across.    2 - Mildly to moderately depressed left ventricular systolic function (EF 40-45%).     3 - Left ventricular diastolic dysfunction. E/e'(lat) is 16    4 - Right ventricular enlargement with mildly depressed systolic function.     5 - Pulmonary hypertension. estimated PA systolic pressure is 56 mmHg.     6 - Intermediate central venous pressure.     7 - No evidence of intracardiac shunt.     8 - No significant change from Echo of 6/4/2014.    Active problems:  Progressive severe SOB, functional class " "IV  Diastolic dysfunction, elevated BNP and Troponin  Hypokalemia due to diuretics  Secondary Pulmonary HTN with peripheral edema  HTN  History of CVA 6/3/2014  PAF controlled with Flecanide  Marked bradycardia with BB  On OAC  Hematuria    At home receiving PT, OT and speech Rx twice a week, with  nurse once a week, no problem with medications. Feeling better, sleeping well. Home /73.    Since visit of 11/14, feeling "much better", concern of occasional HTN, home BP /66-99, HR . Lot more active, no problem. Not using walker, no CP, SOB, nor dizziness. Recent BMP - normal renal function and K+ 4.1.    Since visit of 12/19/2014, worry about HTN home readings similar to office up to . Morning reading is higher. No HA nor visual abnormalities. Xanax has helped but afraid to use too much. ECG shows sinus arrhythmia, rate of 65, late transition and LAFB. Also bothered with dry cough with the Lisinopril. And started to have return of arm pains that was attributed to atorvastatin, on Crestor. Discussed options.     Since visit of 1/16/2015, noted frequent nocturia, 8-10 times, taking losartan-HCT at night time. Have stopped using Lasix and spironolactone for past 2 months. More active without much problem. Labs normal renal function, K+ 4.2, BNP now 37, A1C 5.6%.    Since visit of 2/18/2015, improving, no further dizziness, some SOB when "stressed", usually helped with alprazolam, use only prn, about 3-6 times weekly. Compliant with medications. Been off Crestor for 6 weeks with concern of muscle problem. Home BP is fine, similar to office.     Since visit of 4/15, appetite returned, feeling a lot better. Active, walking twice a week for about half an hour. Also do calisthenic about twice a week, no problem. Seeing Dr. Zavaleta for generalized pruritis for past 3 months. Cardiac wise less AVILA. Sleeping well. Labs in 5/2015, normal CBC without eosinophilia, thyroid normal, ferritin level low normal " "at 21. Off Crestor for couple months due to fear of muscle pain but did not find much difference being off medication. Last Lipid in 11/2014 was good, on daily dose of Crestor. Home /79.    Since visit of 7/2/2015, feeling "great", very active without problem, no more AVILA nor dizziness with standing. Compliant with medications, don't miss. Using Tylenol 500 mg BID for arthritis. Notice easy bruising on Xarelto. Home /10.  Holter - PREDOMINANT RHYTHM  1. Sinus arrhythmia with heart rates varying between 46 and 110 bpm with an average of 73 bpm.     VENTRICULAR ARRHYTHMIAS  1. There were very rare PVCs recorded totalling 8 and averaging less than 1 per hour.     2. There were no episodes of ventricular tachycardia.    SUPRA VENTRICULAR ARRHYTHMIAS  1. There were very frequent PACs totalling 3054 and averaging 132 per hour.  There were 29 bigeminal cycles.  There were 103 couplets.    2. 3% of complexes.    3. There were no episodes of sustained supraventricular tachycardia.    SINUS NODE FUNCTION  1. There was no evidence of high grade SA khai block.     AV CONDUCTION  1. There was no evidence of high grade AV block.     2. The longest RR interval was 1565 msec.     DIARY  1. The diary was properly completed.     2. There was 1 episode of skipping beats reported. The corresponding rhythm strips revealed the following:             During event 1 the rhythm was sinus rhythm at 75 bpm.     3. There was 1 episode of skipped beat reported. The corresponding rhythm strips revealed the following:             During event 1 the rhythm was sinus arrhythmia at 63 bpm.     MISCELLANEOUS  1. This was a tape of adequate length (23 hrs).     Since visit of 10/15, place on new antidepressant, Venlafaxine 75 mg daily, tried 2 and developed rapid HR to 125 bpm, feels more anxious, now switched to Citalopram. Also noted the daily dose of Lorazepam does not seem adequate. Orthostatic VS is positive with lying 142/73, HR " "105, standing 120/62, . Been taking spironolactone 25 mg for last 3 days. Does feel thirsty. Labs reviewed A1C 5.8%, CBC, BMP were WNL. Lipid shows LDL 99, non-. Goal preferred is <70 and < 100. No problem with 10 mg of Crestor. Had vacation at [place] and [place], walked all over without problem.    Since visit of 1/18/2016, no new problem. 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 Labs with , non-, hsCRP at 2.56.     In 10/2016, had acute GB attack with rupture in 8/2016, then tried on Wellbutrin took only 1-2 tabs and BP up to 201/100 with head tightness. Subsequently back to normal, the last 2.5 days took Losartan bid. Discussed Rx options for HTN. Advised to be more active, regular exercise. Lab reviewed LDL in 8/2016 93.     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 Still with daily palpitations, last up to half hours. Have electronic watch, David Barroso, since 9/2016, suppose harmonize patient with the universe. Feeling better if on during the day. Lot of walking at school, no problem.   Holter in 10/2016 - PREDOMINANT RHYTHM  1. Sinus rhythm with heart rates varying between 52 and 144 bpm with an average of 75 bpm.     2. Paroxysmal atrial fibrillation    VENTRICULAR ARRHYTHMIAS  1. There were occasional mostly monomorphic PVCs totalling 596 and averaging 13 per hour.     2. There were no episodes of ventricular tachycardia.    SUPRA VENTRICULAR ARRHYTHMIAS  1. There were frequent PACs totalling 2982 and averaging 69 per hour.  There were 69 bigeminal cycles.  There were 55 couplets.    2. There were no episodes of sustained supraventricular tachycardia.    3. Paroxysmal atrial fibrillation was noted in the the study.     SINUS NODE FUNCTION  1. There was no evidence of high grade SA khai block.     AV CONDUCTION  1. There was no evidence of high grade AV khai filtering.     2. The longest RR interval was 1725 msec.     DIARY  1. The diary was " "returned, but not completed    MISCELLANEOUS  1. This was a tape of adequate length (43 hrs).     ECHO CONCLUSIONS     1 - Hyperdynamic left ventricular systolic function (EF 65-70%).     2 - Normal left ventricular diastolic function.     3 - Normal right ventricular systolic function .     4 - Pulmonary hypertension. The estimated PA systolic pressure is 64 mmHg.     5 - Less evidence for LVH from Echo in 11/2014.     In 5/2017, bothered by recurrent PAF with AVILA after 10 feet walking. Felt better before on Flecainide 100 mg bid, but Holter continued to show PAF. Attempt up titration, problem with itching, switched to propafenone 150 mg tid, continued with itching and reviewed by allergist, treated with 10 days of cortisone with benefit. No hive and no itching, just don't feel good due to the irregular rhythm. History reviewed, had 2 prior AF ablation, last in [place], [place] in around 2000, recall being told not to do again. Recall seeing an [facility] EP doc but didn't like him. Discussed various options. Will stop antiarrhythmic for now, will be going on a 16 days trip to [place]. Reviewed prior medications, have taken Tenormin, metoprolol tartrate, and short-acting diltiazem in the past without problem. Refer to Dr. Calderon for review. ECG in AF, rate 99, PRWP, LAFB    In 11/2017, post AF ablation, felt good for a few weeks, noted some SOB and had review with Dr. Calderon on 11/1 - 72 year old female with a longstanding history of atrial arrhythmias. Her [id number] Score is 5 (age, female, TIA, HTN) so she should remain on anticoagulation indefinitely. She has failed Flecainide. She is now 6 weeks post-PVI and MA flutter line, and maintaining sinus rhythm on low-dose Amiodarone. Given her intermittent AVILA which started post-ablation, I have stopped Amiodarone which I think is the likely culprit. I instructed her to continue taking Lasix PRN, as well as metoprolol and Xarelto." ECG on 11/1 was in NSR, rate 61, " "PRWP.  Recommended to stop amiodarone but then started to feel the palpitation daily, felt nervous and at the same time being taper down on the nightly Ativan. Did have some breakthrough anxiety attacks. Also had strained the upper back and right arm / shoulder, requesting some Tramadol, tried Jan's Tramadol with good relief. Understand this is an opoid. Used Excedrin with caffeine and bothered by more palpitations.    In 3/2018, here for recurrent palpitations, no inciting factors over the past 6 weeks. Had review with Dr. Calderon in 2/2018 - "72 year old female with a longstanding history of atrial arrhythmias and prior ablations at outside institutions. Her BSB9AA4-NDKo Score is 5 (age, female, TIA, HTN) so she should remain on anticoagulation indefinitely. She is now 5 months post-PVI and MA flutter line, and maintaining sinus rhythm off Amiodarone. The plan is to continue Xarelto, and to see me again in 6 months." Palpitations appears associated with AVILA, had difficulties walking in house or up a ramp. Started 100 mg of amiodarone last week, daily, feels some benefit. ECG today in Sinus rhythm vs wandering atrial pacemaker with frequent PJC, rate 59. Also taking Losartan in the morning appears more effective.  Holter 11/2017 - PREDOMINANT RHYTHM  1. Sinus arrhythmia with heart rates varying between 39 and 102 bpm with an average of 58 bpm.     VENTRICULAR ARRHYTHMIAS  1. There were occasional PVCs totalling 728 and averaging 15 per hour.  There were 5 bigeminal cycles.     2. There were no episodes of ventricular tachycardia.    SUPRA VENTRICULAR ARRHYTHMIAS  1. There was no supraventricular ectopic activity.    SINUS NODE FUNCTION  1. There was no evidence of high grade SA khai block.     AV CONDUCTION  1. There was no evidence of high grade AV block.     2. The longest RR interval was 1820 msec.     DIARY  1. The diary was not returned    MISCELLANEOUS  1. There were occasional hookup related artifacts. " "Overall, the study was of good quality.     2. This was a tape of adequate length (48 hrs).     in 5/2018, no new problem, still concern of AVILA, usually with walking, variable as little 10 feet or fine with some long walk, can play flute for an hour but find difficulties in holding a note. Off daily amiodarone, uses it prn for palpitation, find helpful. Labs reviewed from 1/2018, TSH, Vitamin D, LDL 68.2, A1C 5.9%, CMP - normal renal function, K+ 3.6. To go to [place] for a month in 8/2018.    In 9/2018, return from a month family reunion trip to [place]. Problem with hypotension with Tramadol and Losartan. Had review with Dr. Calderon on 9/5 "Ayla Dela Cruz is a 73 year old female with a longstanding history of atrial arrhythmias and prior ablations at outside institutions. Her LKO2FV0-OEOz Score is 5 (age, female, TIA, HTN) so she should remain on anticoagulation indefinitely. She is now 11 months post-PVI and MA flutter line, and maintaining sinus rhythm off Amiodarone. However, she is feeling poorly, with frequent PACs and borderline hypotension. The plan is to stop Losartan, echo in next several weeks, Holter monitor in 6 weeks, and follow-up with me in 8 weeks."  Off both medication on 8/31, no problem now. No heart worry. Denies any CP nor SOB. Still teaching. ECG on 9/5 shows NSR with sinus arrhythmia.    HPI comments: in 3/2019, return for follow up, had syncopal spell with hypotension at [facility] required reconstruction of the right ankle, now in PT, doing well. No heart complication, no AF. LDL 90.6 in 1/2019. 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 No heart worries, no CP nor SOB, much better, breathing improved with daily Breo use. Discussed option with NOAC.    ROS    Constitutional: negative for chills, fatigue, fevers, sweats, no further slurred speech, and no dysarthria, appetite improved, intolerance to heat, bruises easily on NOAC and steroid, weight stable since " "3/2018.  Eyes: negative for icterus, redness and visual disturbance, have visual halo, no more bleed in the right Iris  Respiratory: negative for asthma, cough have resolved off ACE-I, have dyspnea on exertion, occasional SOB with HR 85 to 100 bpm, have lifelong snoring, [place] score 7 improved down to 5, no hemoptysis, pleurisy/chest pain and wheezing  Cardiovascular: negative for chest pain, chest pressure/discomfort, no further orthostatic dizziness, and no palpitations, no paroxysmal nocturnal dyspnea and syncope  Gastrointestinal: negative for abdominal pain, nausea and vomiting, have GERD  Musculoskeletal:positive for arthralgias, and less right sided muscle weakness with improvement in leg strength, improved bilateral ankle pains - OA and no myalgias  Neurological: negative for coordination problems, dizziness, gait problems, headaches, paresthesia, have some tremors but able to draw and no vertigo  Psych: positive for depression, nervousness, anxiety, less stressed due to 's have improved    Objective:    Physical Exam    General appearance: alert, appears stated age, cooperative and no distress, decrease skin turgor.  Head: Normocephalic, without obvious abnormality, atraumatic  Eyes:  conjunctivae/corneas clear. PERRL, EOM's intact.    Neck: no adenopathy, no carotid bruit, no JVD, supple, symmetrical, trachea midline and thyroid not enlarged, symmetric, no tenderness/mass/nodules  Lungs:  clear to auscultation bilaterally  Chest wall: no tenderness  Heart: regular rate and rhythm, normal S1, S2 normal, occasional grade 2/6 systolic murmur, click, rub or gallop    Abdomen: soft, non-tender; bowel sounds normal; no masses,  no organomegaly, waist 31" hip 42"  Extremities: extremities no further weakness of the right upper extremity, atraumatic, no cyanosis and have no edema, minor varicose veins  Pulses: 2+ and symmetric    Assessment:       1. Paroxysmal atrial fibrillation, ablations X 3 1995, " 1997, 9/2017, CHADS-VAS score 5, HAS-BLED score 5     2. Palpitations    3. S/P ablation of atrial fibrillation    4. S/P ablation of atrial flutter    5. TIA (transient ischemic attack), 11/3/2014    6. Hypertensive left ventricular hypertrophy, without heart failure    7. Pure hypercholesterolemia    8. Anticoagulant long-term use    9. Hypoalbuminemia         Plan:       Ayla was seen today for follow-up.    Diagnoses and associated orders for this visit:    Paroxysmal atrial fibrillation, ablations X 3 1995, 1997, 9/2017, CHADS-VAS score 5, HAS-BLED score 5   -     apixaban (ELIQUIS) 5 mg Tab; Take 1 tablet (5 mg total) by mouth 2 (two) times daily.  Dispense: 60 tablet; Refill: 11    Palpitations  -     EKG 12-lead    S/P ablation of atrial fibrillation    S/P ablation of atrial flutter    TIA (transient ischemic attack), 11/3/2014    Hypertensive left ventricular hypertrophy, without heart failure    Pure hypercholesterolemia    Anticoagulant long-term use  -     apixaban (ELIQUIS) 5 mg Tab; Take 1 tablet (5 mg total) by mouth 2 (two) times daily.  Dispense: 60 tablet; Refill: 11    Hypoalbuminemia      - All medical issues reviewed, continue current Rx.  - CV status stable, off antiarrhythmic, all medications reviewed, patient acknowledge good understanding.  - Recommend using Tylenol as first line pain medications.  - Instruction for Mediterranean diet and heart healthy exercise given.  - Need good exercise program, 4 key elements: 1. Aerobic (walking, swimming, dancing, jogging, biking, etc, 2. Muscle strengthening / resistance exercise, need to do 2-3 times weekly, 3. Stretching daily, good stretch takes a whole  total minute. 4. Balance exercise daily.  - Highly recommend 30 minutes of exercise daily, can have Sunday off, with 2-3 sessions of muscle strengthening weekly. A  would be very helpful.  - Recommend at least biannual cardiovascular evaluation in view of her significant risk  factors, patient 's preference      Chronic pruritus    Depression - improved    Stress at home - improved  - Consider Yoga, Gilberto Chi, or meditation    Elevated brain natriuretic peptide (BNP) level, range 98 - 1045 - improved     Pulmonary hypertension, with right sided congestive heart failure, acute    Chest pain on exertion - resolved    Peripheral edema - imporved    Anxiety - imporved    Patient Active Problem List   Diagnosis    Paroxysmal atrial fibrillation, ablations X 3 1995, 1997, 9/2017, CHADS-VAS score 5, HAS-BLED score 5     Hypertension, 1985    Hyperlipidemia, baseline     GERD (gastroesophageal reflux disease)    Glaucoma (increased eye pressure)    Fibroid uterus    Thickened endometrium    Abnormal CT scan, chest    Interstitial lung disease    H/O: CVA (cerebrovascular accident), June 2014    LVH (left ventricular hypertrophy) due to hypertensive disease    Cardiomegaly    BMI 24.8 decreased to 20.9    Depression    AVILA (dyspnea on exertion)    VPC (ventricular premature complex)    OA (osteoarthritis)    Elevated brain natriuretic peptide (BNP) level, range 98 - 1045    Microalbuminuria    Hypoalbuminemia    TIA (transient ischemic attack), 11/3/2014    Statin intolerance    Chronic pruritus, onset 3/2015    Hypotension due to drugs    Cancer screening    Reflux esophagitis    Colon cancer screening    Atrial flutter    S/P ablation of atrial fibrillation    S/P ablation of atrial flutter    JOSE (generalized anxiety disorder)    Muscle strain    Cervical radiculitis    Other spondylosis, lumbar region    Premature junctional contractions    Heart palpitations    Cervical spondylosis    Lumbar radiculitis    Fracture of right ankle, lateral malleolus    Mild persistent asthma without complication    Anticoagulant long-term use    Other spondylosis, cervical region     Total face-to-face time with the patient was 30 minutes and greater than 50% was  spent in counseling and coordination of care. The above assessment and plan have been discussed at length. Labs and procedure over the last 6 months reviewed. Problem List updated. Asked to bring in all active medications / pills bottles with next visit.

## 2019-09-05 NOTE — PLAN OF CARE
Report to Ivania   Melolabial Interpolation Flap Text: A decision was made to reconstruct the defect utilizing an interpolation axial flap and a staged reconstruction.  A telfa template was made of the defect.  This telfa template was then used to outline the melolabial interpolation flap.  The donor area for the pedicle flap was then injected with anesthesia.  The flap was excised through the skin and subcutaneous tissue down to the layer of the underlying musculature.  The pedicle flap was carefully excised within this deep plane to maintain its blood supply.  The edges of the donor site were undermined.   The donor site was closed in a primary fashion.  The pedicle was then rotated into position and sutured.  Once the tube was sutured into place, adequate blood supply was confirmed with blanching and refill.  The pedicle was then wrapped with xeroform gauze and dressed appropriately with a telfa and gauze bandage to ensure continued blood supply and protect the attached pedicle.

## 2019-09-10 ENCOUNTER — HOSPITAL ENCOUNTER (OUTPATIENT)
Facility: AMBULARY SURGERY CENTER | Age: 74
Discharge: HOME | End: 2019-09-10
Attending: ANESTHESIOLOGY | Admitting: ANESTHESIOLOGY
Payer: MEDICARE

## 2019-09-10 DIAGNOSIS — M47.896 OTHER SPONDYLOSIS, LUMBAR REGION: Primary | ICD-10-CM

## 2019-09-10 PROCEDURE — 64636 DESTROY L/S FACET JNT ADDL: CPT | Mod: 50,,, | Performed by: ANESTHESIOLOGY

## 2019-09-10 PROCEDURE — 99152 PR MOD CONSCIOUS SEDATION, SAME PHYS, 5+ YRS, FIRST 15 MIN: ICD-10-PCS | Mod: ,,, | Performed by: ANESTHESIOLOGY

## 2019-09-10 PROCEDURE — 64635 DESTROY LUMB/SAC FACET JNT: CPT | Mod: RT | Performed by: ANESTHESIOLOGY

## 2019-09-10 PROCEDURE — 64635 PR DESTROY LUMB/SAC FACET JNT: ICD-10-PCS | Mod: 50,,, | Performed by: ANESTHESIOLOGY

## 2019-09-10 PROCEDURE — 64636 DESTROY L/S FACET JNT ADDL: CPT | Mod: RT | Performed by: ANESTHESIOLOGY

## 2019-09-10 PROCEDURE — 64636 PR DESTROY L/S FACET JNT ADDL: ICD-10-PCS | Mod: 50,,, | Performed by: ANESTHESIOLOGY

## 2019-09-10 PROCEDURE — 99152 MOD SED SAME PHYS/QHP 5/>YRS: CPT | Mod: ,,, | Performed by: ANESTHESIOLOGY

## 2019-09-10 PROCEDURE — 64635 DESTROY LUMB/SAC FACET JNT: CPT | Mod: 50,,, | Performed by: ANESTHESIOLOGY

## 2019-09-10 RX ORDER — LIDOCAINE HYDROCHLORIDE 10 MG/ML
INJECTION, SOLUTION EPIDURAL; INFILTRATION; INTRACAUDAL; PERINEURAL
Status: DISCONTINUED | OUTPATIENT
Start: 2019-09-10 | End: 2019-09-10 | Stop reason: HOSPADM

## 2019-09-10 RX ORDER — LIDOCAINE HYDROCHLORIDE 20 MG/ML
INJECTION, SOLUTION EPIDURAL; INFILTRATION; INTRACAUDAL; PERINEURAL
Status: DISCONTINUED | OUTPATIENT
Start: 2019-09-10 | End: 2019-09-10 | Stop reason: HOSPADM

## 2019-09-10 RX ORDER — MIDAZOLAM HYDROCHLORIDE 1 MG/ML
INJECTION INTRAMUSCULAR; INTRAVENOUS
Status: DISCONTINUED | OUTPATIENT
Start: 2019-09-10 | End: 2019-09-10 | Stop reason: HOSPADM

## 2019-09-10 RX ORDER — METHYLPREDNISOLONE ACETATE 80 MG/ML
INJECTION, SUSPENSION INTRA-ARTICULAR; INTRALESIONAL; INTRAMUSCULAR; SOFT TISSUE
Status: DISCONTINUED | OUTPATIENT
Start: 2019-09-10 | End: 2019-09-10 | Stop reason: HOSPADM

## 2019-09-10 RX ORDER — BUPIVACAINE HYDROCHLORIDE 2.5 MG/ML
INJECTION, SOLUTION EPIDURAL; INFILTRATION; INTRACAUDAL
Status: DISCONTINUED | OUTPATIENT
Start: 2019-09-10 | End: 2019-09-10 | Stop reason: HOSPADM

## 2019-09-10 RX ORDER — FENTANYL CITRATE 50 UG/ML
INJECTION, SOLUTION INTRAMUSCULAR; INTRAVENOUS
Status: DISCONTINUED | OUTPATIENT
Start: 2019-09-10 | End: 2019-09-10 | Stop reason: HOSPADM

## 2019-09-10 RX ORDER — SODIUM CHLORIDE, SODIUM LACTATE, POTASSIUM CHLORIDE, CALCIUM CHLORIDE 600; 310; 30; 20 MG/100ML; MG/100ML; MG/100ML; MG/100ML
INJECTION, SOLUTION INTRAVENOUS ONCE AS NEEDED
Status: COMPLETED | OUTPATIENT
Start: 2019-09-10 | End: 2019-09-10

## 2019-09-10 RX ADMIN — SODIUM CHLORIDE, SODIUM LACTATE, POTASSIUM CHLORIDE, CALCIUM CHLORIDE: 600; 310; 30; 20 INJECTION, SOLUTION INTRAVENOUS at 12:09

## 2019-09-10 NOTE — DISCHARGE SUMMARY
[facility]  Discharge Note  Short Stay    Admit Date: 9/10/2019    Discharge Date and Time: 9/10/2019    Attending Physician: Galo Clancy MD     Discharge Provider: Galo Clancy    Diagnoses:  Active Hospital Problems    Diagnosis  POA    Other spondylosis, lumbar region [M47.896]  Yes      Resolved Hospital Problems   No resolved problems to display.       Hospital Course: Lumbar MB RFA  Discharged Condition: Good    Final Diagnoses:   Active Hospital Problems    Diagnosis  POA    Other spondylosis, lumbar region [M47.896]  Yes      Resolved Hospital Problems   No resolved problems to display.       Disposition: Home or Self Care    Follow up/Patient Instructions:    Medications:  Reconciled Home Medications:      Medication List      CHANGE how you take these medications    LORazepam 1 MG tablet  Commonly known as:  ATIVAN  TAKE 1 TABLET BY MOUTH DAILY  What changed:    · how much to take  · how to take this  · when to take this  · additional instructions        CONTINUE taking these medications    ammonium lactate 12 % lotion  Commonly known as:  LAC-HYDRIN     apixaban 5 mg Tab  Commonly known as:  ELIQUIS  Take 1 tablet (5 mg total) by mouth 2 (two) times daily.     ATROPINE SULFATE (PF) OPHT  Apply to eye.     COSOPT 22.3-6.8 mg/mL ophthalmic solution  Generic drug:  dorzolamide-timolol 2-0.5%  Place 1 drop into both eyes 2 (two) times daily. Twice a day     dapsone 25 MG Tab  TAKE 2 TABLETS BY MOUTH ONCE DAILY RECHECK CBC IN 1MONTH     fluticasone furoate-vilanterol 200-25 mcg/dose Dsdv diskus inhaler  Commonly known as:  BREO ELLIPTA  Inhale 1 puff into the lungs once daily.     fluticasone propionate 50 mcg/actuation nasal spray  Commonly known as:  FLONASE  1 spray (50 mcg total) by Each Nare route once daily.     FLUZONE HIGH-DOSE 2018-19 (PF) 180 mcg/0.5 mL vaccine  Generic drug:  influenza  TO BE ADMINISTERED BY PHARMACIST FOR IMMUNIZATION     gabapentin 300 MG capsule  Commonly known as:   NEURONTIN  TAKE ONE CAPSULE BY MOUTH ONCE EVERY EVENING     homatropine 5 % ophthalmic solution  Commonly known as:  ISOPTO HOMATROPINE  INSTILL 1 DROP INTO RIGHT EYE ONCE A DAY     levalbuterol 45 mcg/actuation inhaler  Commonly known as:  XOPENEX HFA  Inhale 1-2 puffs into the lungs every 4 (four) hours as needed for Wheezing. This is a rescue inhaler medication and should be used as needed     metoprolol tartrate 50 MG tablet  Commonly known as:  LOPRESSOR  TAKE 1 TABLET (50 MG TOTAL) BY MOUTH 2 (TWO) TIMES DAILY.     metroNIDAZOLE 0.75 % Lotn  APPLY A PEA SIZED AMOUNT TO FACE DAILY     pantoprazole 40 MG tablet  Commonly known as:  PROTONIX  Take 1 tablet (40 mg total) by mouth once daily.     predniSONE 5 MG tablet  Commonly known as:  DELTASONE  Take 2 tablets (10 mg total) by mouth 2 (two) times daily. may repeat for shortness of breath.     rosuvastatin 40 MG Tab  Commonly known as:  CRESTOR  TAKE 1 TABLET (40 MG TOTAL) BY MOUTH EVERY EVENING.     spironolactone 25 MG tablet  Commonly known as:  ALDACTONE  Take 1 tablet (25 mg total) by mouth once daily.     VIIBRYD 40 mg Tab tablet  Generic drug:  vilazodone  Take 40 mg by mouth once daily.          Discharge Procedure Orders   Call MD for:  temperature >100.4     Call MD for:  persistent nausea and vomiting or diarrhea     Call MD for:  severe uncontrolled pain     Call MD for:  redness, tenderness, or signs of infection (pain, swelling, redness, odor or green/yellow discharge around incision site)     Call MD for:  difficulty breathing or increased cough     Call MD for:  severe persistent headache        Follow up with MD in 2-3 weeks    Discharge Procedure Orders (must include Diet, Follow-up, Activity):   Discharge Procedure Orders (must include Diet, Follow-up, Activity)   Call MD for:  temperature >100.4     Call MD for:  persistent nausea and vomiting or diarrhea     Call MD for:  severe uncontrolled pain     Call MD for:  redness, tenderness, or  signs of infection (pain, swelling, redness, odor or green/yellow discharge around incision site)     Call MD for:  difficulty breathing or increased cough     Call MD for:  severe persistent headache

## 2019-09-10 NOTE — OP NOTE
PROCEDURE DATE: 9/10/2019    PROCEDURE:  Radiofrequency ablation of the L3,4,5 medial branch nerves on the bilateral-side utilizing fluoroscopy    DIAGNOSIS:  Other lumbar spondylosis  Post op Diagnosis: Same    PHYSICIAN: Galo Clancy MD    MEDICATIONS INJECTED:  From a mixture of 6ml of 0.25% bupivicaine and 80mg of methylprednisone,  1ml of this solution was injected at each level.    LOCAL ANESTHETIC USED: Lidocaine 1%, 2 ml given at each site.    SEDATION MEDICATIONS: RN IV sedation    ESTIMATED BLOOD LOSS:  None    COMPLICATIONS:  None    TECHNIQUE:  A time out was taken to identify patient and procedure side prior to starting the procedure. Laying in a prone position, the patient was prepped and draped in the usual sterile fashion using ChloraPrep and sterile towels.  The levels were determined under fluoroscopic guidance and then marked.  Local anesthetic was given by raising a wheal at the skin over each site and then infiltrated approximately 2cm deeper.  A 20-gauge  100 mm RF needle was introduced to the anatomic location of the bilateral L3,4,5 medial branch nerves.  Motor stimulation up to 2 Volts at each level confirmed no motor nerve involvement.  Impedance was less than 800 ohms at each level.  1ml of 2% lidocaine was instilled prior to lesioning.  Ablation was performed per level utilizing radiofrequency generator 80°C for 60 seconds.  Needles were then rotated 90° and a second lesion was performed.  The above noted medication was then injected slowly. The patient tolerated the procedure well.     The patient was monitored after the procedure.  Patient was given post procedure and discharge instructions to follow at home.  The patient was discharged in a stable condition

## 2019-09-10 NOTE — PLAN OF CARE
Pt states ready to go home , stable, gaurav po fluids, ambulatory, denies pain, Leg raises performed in bed without diff and instructed on fall risk. PT verbalized understanding

## 2019-09-10 NOTE — DISCHARGE INSTRUCTIONS
Recovery After Procedural Sedation (Adult)  You have been given medicine by vein to make you sleep during your surgery. This may have included both a pain medicine and sleeping medicine. Most of the effects have worn off. But you may still have some drowsiness for the next 6 to 8 hours.  Home care  Follow these guidelines when you get home:  · For the next 8 hours, you should be watched by a responsible adult. This person should make sure your condition is not getting worse.  · Don't drink any alcohol for the next 24 hours.  · Don't drive, operate dangerous machinery, or make important business or personal decisions during the next 24 hours.  Note: Your healthcare provider may tell you not to take any medicine by mouth for pain or sleep in the next 4 hours. These medicines may react with the medicines you were given in the hospital. This could cause a much stronger response than usual.  Follow-up care  Follow up with your healthcare provider if you are not alert and back to your usual level of activity within 12 hours.  When to seek medical advice  Call your healthcare provider right away if any of these occur:  · Drowsiness gets worse  · Weakness or dizziness gets worse  · Repeated vomiting  · You can't be awakened   Date Last Reviewed: 10/18/2016  © 0557-9495 [employer]. [address] [place] [address], [place] [address]. All rights reserved. This information is not intended as a substitute for professional medical care. Always follow your healthcare professional's instructions.      Radiofrequency Thermocoagulation Recovery at Home  What to know about pain relief  An injection to reduce inflammation takes a few days to work, sometimes even up to a week. There may even be more pain at first.  Tips for recovery  · You may use an ice pack at your injection site for comfort.  · You may shower this evening.   · Do not use a heating pad or take tub baths or swim for 2 days.  · Take your usual medication for  pain if needed.  · Gradually increase your activities.  · Dont lift anything over 10 lbs for the first 24 hours  · Dont drive the day of the procedure.  · Wait until tomorrow to resume any blood thinners (aspirin, Plavix, Coumadin) but you may resume all your other medications today.  When to call your doctor  Call right away if you notice any of the following symptoms:  · Severe pain or headache  · Fever or chills  · Redness or swelling around the injection site   · Difficulty breathing  · Vomiting  · Increasing numbness or weakness in arms or legs  · If you are diabetic, a steroid injection can increase your blood sugar so moniter it carefully.

## 2019-09-11 VITALS
DIASTOLIC BLOOD PRESSURE: 80 MMHG | HEIGHT: 67 IN | HEART RATE: 60 BPM | TEMPERATURE: 98 F | BODY MASS INDEX: 20.88 KG/M2 | RESPIRATION RATE: 18 BRPM | OXYGEN SATURATION: 94 % | SYSTOLIC BLOOD PRESSURE: 156 MMHG | WEIGHT: 133 LBS

## 2019-09-20 RX ORDER — GABAPENTIN 300 MG/1
CAPSULE ORAL
Qty: 30 CAPSULE | Refills: 0 | Status: SHIPPED | OUTPATIENT
Start: 2019-09-20 | End: 2019-10-15 | Stop reason: SDUPTHER

## 2019-10-01 ENCOUNTER — OFFICE VISIT (OUTPATIENT)
Dept: GASTROENTEROLOGY | Facility: CLINIC | Age: 74
End: 2019-10-01
Payer: MEDICARE

## 2019-10-01 DIAGNOSIS — Z98.890 S/P ABLATION OF ATRIAL FIBRILLATION: ICD-10-CM

## 2019-10-01 DIAGNOSIS — I48.4 ATYPICAL ATRIAL FLUTTER: Primary | ICD-10-CM

## 2019-10-01 DIAGNOSIS — Z86.010 HISTORY OF COLON POLYPS: ICD-10-CM

## 2019-10-01 DIAGNOSIS — K62.5 RECTAL BLEEDING: ICD-10-CM

## 2019-10-01 DIAGNOSIS — Z86.79 S/P ABLATION OF ATRIAL FIBRILLATION: ICD-10-CM

## 2019-10-01 DIAGNOSIS — Z79.01 ANTICOAGULANT LONG-TERM USE: ICD-10-CM

## 2019-10-01 PROBLEM — K21.00 REFLUX ESOPHAGITIS: Status: RESOLVED | Noted: 2017-01-05 | Resolved: 2019-10-01

## 2019-10-01 PROBLEM — Z12.11 COLON CANCER SCREENING: Status: RESOLVED | Noted: 2017-01-19 | Resolved: 2019-10-01

## 2019-10-01 PROCEDURE — 99205 OFFICE O/P NEW HI 60 MIN: CPT | Mod: S$PBB,,, | Performed by: INTERNAL MEDICINE

## 2019-10-01 PROCEDURE — 99999 PR PBB SHADOW E&M-EST. PATIENT-LVL II: ICD-10-PCS | Mod: PBBFAC,,, | Performed by: INTERNAL MEDICINE

## 2019-10-01 PROCEDURE — 99212 OFFICE O/P EST SF 10 MIN: CPT | Mod: PBBFAC,PO | Performed by: INTERNAL MEDICINE

## 2019-10-01 PROCEDURE — 99999 PR PBB SHADOW E&M-EST. PATIENT-LVL II: CPT | Mod: PBBFAC,,, | Performed by: INTERNAL MEDICINE

## 2019-10-01 PROCEDURE — 99205 PR OFFICE/OUTPT VISIT, NEW, LEVL V, 60-74 MIN: ICD-10-PCS | Mod: S$PBB,,, | Performed by: INTERNAL MEDICINE

## 2019-10-01 NOTE — PROGRESS NOTES
Subjective:       Patient ID: Ayla Dela Cruz is a 74 y.o. female.    This patient is new to me.        Chief Complaint: Rectal bleeding    Patient seen for rectal bleeding, onset about 6 months ago, small to moderate amount, bright red in color, with associated signs/symptoms including none, and alleviating/exacerbating factors including none.  She denies rectal pain, change in bowel habits, or weight loss.  No upper GI symptoms.  Old records from Dr. Schultz showed resection of large adenoma at the [facility] 3 years ago.  She is on chronic anticoagulation for atrial fibrillation for which she has had ablation in the past.  No other acute complaints.        Review of Systems   Constitutional: Negative for chills, fatigue and fever.   HENT: Negative for sore throat and trouble swallowing.    Respiratory: Negative for cough, shortness of breath and wheezing.    Cardiovascular: Negative for chest pain and palpitations.   Gastrointestinal: Positive for blood in stool. Negative for abdominal pain, nausea and vomiting.   Genitourinary: Negative for dysuria and hematuria.   Musculoskeletal: Negative for arthralgias and myalgias.   Skin: Negative for color change and rash.   Neurological: Negative for dizziness and headaches.   Hematological: Negative for adenopathy.   Psychiatric/Behavioral: Negative for confusion. The patient is not nervous/anxious.    All other systems reviewed and are negative.      Objective:       VSS    Physical Exam   Constitutional: She appears well-developed and well-nourished.   HENT:   Head: Normocephalic and atraumatic.   Eyes: Pupils are equal, round, and reactive to light. No scleral icterus.   Neck: Normal range of motion.   Cardiovascular: Normal rate and regular rhythm.   No murmur heard.  Pulmonary/Chest: Effort normal and breath sounds normal. She has no wheezes.   Abdominal: Soft. Bowel sounds are normal. She exhibits no distension. There is no tenderness.   Musculoskeletal: She exhibits  no edema or tenderness.   Lymphadenopathy:     She has no cervical adenopathy.   Neurological: She is alert.   Skin: Skin is warm and dry. No rash noted.   Vitals reviewed.        Lab Results   Component Value Date    WBC 13.15 (H) 05/13/2019    HGB 12.2 05/13/2019    HCT 39.7 05/13/2019    MCV 99 (H) 05/13/2019     05/13/2019       CMP  Sodium   Date Value Ref Range Status   01/29/2019 143 136 - 145 mmol/L Final     Potassium   Date Value Ref Range Status   01/29/2019 3.6 3.5 - 5.1 mmol/L Final     Chloride   Date Value Ref Range Status   01/29/2019 110 95 - 110 mmol/L Final     CO2   Date Value Ref Range Status   01/29/2019 25 23 - 29 mmol/L Final     Glucose   Date Value Ref Range Status   01/29/2019 80 70 - 110 mg/dL Final     BUN, Bld   Date Value Ref Range Status   01/29/2019 20 8 - 23 mg/dL Final     Creatinine   Date Value Ref Range Status   01/29/2019 0.8 0.5 - 1.4 mg/dL Final   09/24/2012 0.6 0.2 - 1.4 mg/dl Final     Calcium   Date Value Ref Range Status   01/29/2019 9.2 8.7 - 10.5 mg/dL Final   09/24/2012 9.9 8.6 - 10.2 mg/dl Final     Total Protein   Date Value Ref Range Status   01/29/2019 6.3 6.0 - 8.4 g/dL Final     Albumin   Date Value Ref Range Status   01/29/2019 3.1 (L) 3.5 - 5.2 g/dL Final     Total Bilirubin   Date Value Ref Range Status   01/29/2019 1.2 (H) 0.1 - 1.0 mg/dL Final     Comment:     For infants and newborns, interpretation of results should be based  on gestational age, weight and in agreement with clinical  observations.  Premature Infant recommended reference ranges:  Up to 24 hours.............<8.0 mg/dL  Up to 48 hours............<12.0 mg/dL  3-5 days..................<15.0 mg/dL  6-29 days.................<15.0 mg/dL       Alkaline Phosphatase   Date Value Ref Range Status   01/29/2019 61 55 - 135 U/L Final   09/24/2012 63 23 - 119 UNIT/L Final     AST   Date Value Ref Range Status   01/29/2019 20 10 - 40 U/L Final   09/24/2012 26 10 - 30 UNIT/L Final     ALT   Date  Value Ref Range Status   01/29/2019 15 10 - 44 U/L Final     Anion Gap   Date Value Ref Range Status   01/29/2019 8 8 - 16 mmol/L Final   09/24/2012 12 5 - 15 meq/L Final     eGFR if    Date Value Ref Range Status   01/29/2019 >60.0 >60 mL/min/1.73 m^2 Final     eGFR if non    Date Value Ref Range Status   01/29/2019 >60.0 >60 mL/min/1.73 m^2 Final     Comment:     Calculation used to obtain the estimated glomerular filtration  rate (eGFR) is the CKD-EPI equation.          Further history was obtained from the patient's  who was present throughout the interview and states that she is likely due for colonoscopy.  History is otherwise as above in the HPI.     Old records from Dr. Schultz reviewed and are as summarized above in the HPI.      Past CXR was independently visualized and reviewed by me and showed COPD.    MDM:  New patient, further workup planned.  Data as above.  High risk secondary to anticoagulation.  Endoscopy planned.      Assessment:       1. Atypical atrial flutter    2. S/P ablation of atrial fibrillation    3. Rectal bleeding    4. History of colon polyps    5. Anticoagulant long-term use        Plan:       1.  Schedule colonoscopy  2.  Needs anticoagulation held before procedure  3.  Further recommendations to follow after above.

## 2019-10-01 NOTE — H&P (VIEW-ONLY)
Subjective:       Patient ID: Ayla Dela Cruz is a 74 y.o. female.    This patient is new to me.        Chief Complaint: Rectal bleeding    Patient seen for rectal bleeding, onset about 6 months ago, small to moderate amount, bright red in color, with associated signs/symptoms including none, and alleviating/exacerbating factors including none.  She denies rectal pain, change in bowel habits, or weight loss.  No upper GI symptoms.  Old records from Dr. Schultz showed resection of large adenoma at the [facility] 3 years ago.  She is on chronic anticoagulation for atrial fibrillation for which she has had ablation in the past.  No other acute complaints.        Review of Systems   Constitutional: Negative for chills, fatigue and fever.   HENT: Negative for sore throat and trouble swallowing.    Respiratory: Negative for cough, shortness of breath and wheezing.    Cardiovascular: Negative for chest pain and palpitations.   Gastrointestinal: Positive for blood in stool. Negative for abdominal pain, nausea and vomiting.   Genitourinary: Negative for dysuria and hematuria.   Musculoskeletal: Negative for arthralgias and myalgias.   Skin: Negative for color change and rash.   Neurological: Negative for dizziness and headaches.   Hematological: Negative for adenopathy.   Psychiatric/Behavioral: Negative for confusion. The patient is not nervous/anxious.    All other systems reviewed and are negative.      Objective:       VSS    Physical Exam   Constitutional: She appears well-developed and well-nourished.   HENT:   Head: Normocephalic and atraumatic.   Eyes: Pupils are equal, round, and reactive to light. No scleral icterus.   Neck: Normal range of motion.   Cardiovascular: Normal rate and regular rhythm.   No murmur heard.  Pulmonary/Chest: Effort normal and breath sounds normal. She has no wheezes.   Abdominal: Soft. Bowel sounds are normal. She exhibits no distension. There is no tenderness.   Musculoskeletal: She exhibits  no edema or tenderness.   Lymphadenopathy:     She has no cervical adenopathy.   Neurological: She is alert.   Skin: Skin is warm and dry. No rash noted.   Vitals reviewed.        Lab Results   Component Value Date    WBC 13.15 (H) 05/13/2019    HGB 12.2 05/13/2019    HCT 39.7 05/13/2019    MCV 99 (H) 05/13/2019     05/13/2019       CMP  Sodium   Date Value Ref Range Status   01/29/2019 143 136 - 145 mmol/L Final     Potassium   Date Value Ref Range Status   01/29/2019 3.6 3.5 - 5.1 mmol/L Final     Chloride   Date Value Ref Range Status   01/29/2019 110 95 - 110 mmol/L Final     CO2   Date Value Ref Range Status   01/29/2019 25 23 - 29 mmol/L Final     Glucose   Date Value Ref Range Status   01/29/2019 80 70 - 110 mg/dL Final     BUN, Bld   Date Value Ref Range Status   01/29/2019 20 8 - 23 mg/dL Final     Creatinine   Date Value Ref Range Status   01/29/2019 0.8 0.5 - 1.4 mg/dL Final   09/24/2012 0.6 0.2 - 1.4 mg/dl Final     Calcium   Date Value Ref Range Status   01/29/2019 9.2 8.7 - 10.5 mg/dL Final   09/24/2012 9.9 8.6 - 10.2 mg/dl Final     Total Protein   Date Value Ref Range Status   01/29/2019 6.3 6.0 - 8.4 g/dL Final     Albumin   Date Value Ref Range Status   01/29/2019 3.1 (L) 3.5 - 5.2 g/dL Final     Total Bilirubin   Date Value Ref Range Status   01/29/2019 1.2 (H) 0.1 - 1.0 mg/dL Final     Comment:     For infants and newborns, interpretation of results should be based  on gestational age, weight and in agreement with clinical  observations.  Premature Infant recommended reference ranges:  Up to 24 hours.............<8.0 mg/dL  Up to 48 hours............<12.0 mg/dL  3-5 days..................<15.0 mg/dL  6-29 days.................<15.0 mg/dL       Alkaline Phosphatase   Date Value Ref Range Status   01/29/2019 61 55 - 135 U/L Final   09/24/2012 63 23 - 119 UNIT/L Final     AST   Date Value Ref Range Status   01/29/2019 20 10 - 40 U/L Final   09/24/2012 26 10 - 30 UNIT/L Final     ALT   Date  Value Ref Range Status   01/29/2019 15 10 - 44 U/L Final     Anion Gap   Date Value Ref Range Status   01/29/2019 8 8 - 16 mmol/L Final   09/24/2012 12 5 - 15 meq/L Final     eGFR if    Date Value Ref Range Status   01/29/2019 >60.0 >60 mL/min/1.73 m^2 Final     eGFR if non    Date Value Ref Range Status   01/29/2019 >60.0 >60 mL/min/1.73 m^2 Final     Comment:     Calculation used to obtain the estimated glomerular filtration  rate (eGFR) is the CKD-EPI equation.          Further history was obtained from the patient's  who was present throughout the interview and states that she is likely due for colonoscopy.  History is otherwise as above in the HPI.     Old records from Dr. Schultz reviewed and are as summarized above in the HPI.      Past CXR was independently visualized and reviewed by me and showed COPD.    MDM:  New patient, further workup planned.  Data as above.  High risk secondary to anticoagulation.  Endoscopy planned.      Assessment:       1. Atypical atrial flutter    2. S/P ablation of atrial fibrillation    3. Rectal bleeding    4. History of colon polyps    5. Anticoagulant long-term use        Plan:       1.  Schedule colonoscopy  2.  Needs anticoagulation held before procedure  3.  Further recommendations to follow after above.

## 2019-10-01 NOTE — H&P (VIEW-ONLY)
Subjective:       Patient ID: Ayla Dela Cruz is a 74 y.o. female.    This patient is new to me.        Chief Complaint: Rectal bleeding    Patient seen for rectal bleeding, onset about 6 months ago, small to moderate amount, bright red in color, with associated signs/symptoms including none, and alleviating/exacerbating factors including none.  She denies rectal pain, change in bowel habits, or weight loss.  No upper GI symptoms.  Old records from Dr. Schultz showed resection of large adenoma at the [facility] 3 years ago.  She is on chronic anticoagulation for atrial fibrillation for which she has had ablation in the past.  No other acute complaints.        Review of Systems   Constitutional: Negative for chills, fatigue and fever.   HENT: Negative for sore throat and trouble swallowing.    Respiratory: Negative for cough, shortness of breath and wheezing.    Cardiovascular: Negative for chest pain and palpitations.   Gastrointestinal: Positive for blood in stool. Negative for abdominal pain, nausea and vomiting.   Genitourinary: Negative for dysuria and hematuria.   Musculoskeletal: Negative for arthralgias and myalgias.   Skin: Negative for color change and rash.   Neurological: Negative for dizziness and headaches.   Hematological: Negative for adenopathy.   Psychiatric/Behavioral: Negative for confusion. The patient is not nervous/anxious.    All other systems reviewed and are negative.      Objective:       VSS    Physical Exam   Constitutional: She appears well-developed and well-nourished.   HENT:   Head: Normocephalic and atraumatic.   Eyes: Pupils are equal, round, and reactive to light. No scleral icterus.   Neck: Normal range of motion.   Cardiovascular: Normal rate and regular rhythm.   No murmur heard.  Pulmonary/Chest: Effort normal and breath sounds normal. She has no wheezes.   Abdominal: Soft. Bowel sounds are normal. She exhibits no distension. There is no tenderness.   Musculoskeletal: She exhibits  no edema or tenderness.   Lymphadenopathy:     She has no cervical adenopathy.   Neurological: She is alert.   Skin: Skin is warm and dry. No rash noted.   Vitals reviewed.        Lab Results   Component Value Date    WBC 13.15 (H) 05/13/2019    HGB 12.2 05/13/2019    HCT 39.7 05/13/2019    MCV 99 (H) 05/13/2019     05/13/2019       CMP  Sodium   Date Value Ref Range Status   01/29/2019 143 136 - 145 mmol/L Final     Potassium   Date Value Ref Range Status   01/29/2019 3.6 3.5 - 5.1 mmol/L Final     Chloride   Date Value Ref Range Status   01/29/2019 110 95 - 110 mmol/L Final     CO2   Date Value Ref Range Status   01/29/2019 25 23 - 29 mmol/L Final     Glucose   Date Value Ref Range Status   01/29/2019 80 70 - 110 mg/dL Final     BUN, Bld   Date Value Ref Range Status   01/29/2019 20 8 - 23 mg/dL Final     Creatinine   Date Value Ref Range Status   01/29/2019 0.8 0.5 - 1.4 mg/dL Final   09/24/2012 0.6 0.2 - 1.4 mg/dl Final     Calcium   Date Value Ref Range Status   01/29/2019 9.2 8.7 - 10.5 mg/dL Final   09/24/2012 9.9 8.6 - 10.2 mg/dl Final     Total Protein   Date Value Ref Range Status   01/29/2019 6.3 6.0 - 8.4 g/dL Final     Albumin   Date Value Ref Range Status   01/29/2019 3.1 (L) 3.5 - 5.2 g/dL Final     Total Bilirubin   Date Value Ref Range Status   01/29/2019 1.2 (H) 0.1 - 1.0 mg/dL Final     Comment:     For infants and newborns, interpretation of results should be based  on gestational age, weight and in agreement with clinical  observations.  Premature Infant recommended reference ranges:  Up to 24 hours.............<8.0 mg/dL  Up to 48 hours............<12.0 mg/dL  3-5 days..................<15.0 mg/dL  6-29 days.................<15.0 mg/dL       Alkaline Phosphatase   Date Value Ref Range Status   01/29/2019 61 55 - 135 U/L Final   09/24/2012 63 23 - 119 UNIT/L Final     AST   Date Value Ref Range Status   01/29/2019 20 10 - 40 U/L Final   09/24/2012 26 10 - 30 UNIT/L Final     ALT   Date  Value Ref Range Status   01/29/2019 15 10 - 44 U/L Final     Anion Gap   Date Value Ref Range Status   01/29/2019 8 8 - 16 mmol/L Final   09/24/2012 12 5 - 15 meq/L Final     eGFR if    Date Value Ref Range Status   01/29/2019 >60.0 >60 mL/min/1.73 m^2 Final     eGFR if non    Date Value Ref Range Status   01/29/2019 >60.0 >60 mL/min/1.73 m^2 Final     Comment:     Calculation used to obtain the estimated glomerular filtration  rate (eGFR) is the CKD-EPI equation.          Further history was obtained from the patient's  who was present throughout the interview and states that she is likely due for colonoscopy.  History is otherwise as above in the HPI.     Old records from Dr. Schultz reviewed and are as summarized above in the HPI.      Past CXR was independently visualized and reviewed by me and showed COPD.    MDM:  New patient, further workup planned.  Data as above.  High risk secondary to anticoagulation.  Endoscopy planned.      Assessment:       1. Atypical atrial flutter    2. S/P ablation of atrial fibrillation    3. Rectal bleeding    4. History of colon polyps    5. Anticoagulant long-term use        Plan:       1.  Schedule colonoscopy  2.  Needs anticoagulation held before procedure  3.  Further recommendations to follow after above.

## 2019-10-10 ENCOUNTER — HOSPITAL ENCOUNTER (OUTPATIENT)
Facility: HOSPITAL | Age: 74
Discharge: HOME | End: 2019-10-10
Attending: INTERNAL MEDICINE | Admitting: INTERNAL MEDICINE
Payer: MEDICARE

## 2019-10-10 ENCOUNTER — ANESTHESIA (OUTPATIENT)
Dept: ENDOSCOPY | Facility: HOSPITAL | Age: 74
End: 2019-10-10
Payer: MEDICARE

## 2019-10-10 ENCOUNTER — PATIENT MESSAGE (OUTPATIENT)
Dept: PAIN MEDICINE | Facility: CLINIC | Age: 74
End: 2019-10-10

## 2019-10-10 ENCOUNTER — TELEPHONE (OUTPATIENT)
Dept: SURGERY | Facility: CLINIC | Age: 74
End: 2019-10-10

## 2019-10-10 ENCOUNTER — TELEPHONE (OUTPATIENT)
Dept: GASTROENTEROLOGY | Facility: CLINIC | Age: 74
End: 2019-10-10

## 2019-10-10 ENCOUNTER — ANESTHESIA EVENT (OUTPATIENT)
Dept: ENDOSCOPY | Facility: HOSPITAL | Age: 74
End: 2019-10-10
Payer: MEDICARE

## 2019-10-10 DIAGNOSIS — K62.5 RECTAL BLEEDING: ICD-10-CM

## 2019-10-10 DIAGNOSIS — K64.8 INTERNAL HEMORRHOIDS: ICD-10-CM

## 2019-10-10 DIAGNOSIS — K63.5 POLYP OF COLON, UNSPECIFIED PART OF COLON, UNSPECIFIED TYPE: Primary | ICD-10-CM

## 2019-10-10 DIAGNOSIS — K21.9 GASTROESOPHAGEAL REFLUX DISEASE, ESOPHAGITIS PRESENCE NOT SPECIFIED: Primary | ICD-10-CM

## 2019-10-10 DIAGNOSIS — K64.9 HEMORRHOIDS, UNSPECIFIED HEMORRHOID TYPE: Primary | ICD-10-CM

## 2019-10-10 PROCEDURE — 63600175 PHARM REV CODE 636 W HCPCS: Performed by: NURSE ANESTHETIST, CERTIFIED REGISTERED

## 2019-10-10 PROCEDURE — 63600175 PHARM REV CODE 636 W HCPCS: Performed by: INTERNAL MEDICINE

## 2019-10-10 PROCEDURE — 45385 COLONOSCOPY W/LESION REMOVAL: CPT | Performed by: INTERNAL MEDICINE

## 2019-10-10 PROCEDURE — 45385 COLONOSCOPY W/LESION REMOVAL: CPT | Mod: ,,, | Performed by: INTERNAL MEDICINE

## 2019-10-10 PROCEDURE — D9220A PRA ANESTHESIA: ICD-10-PCS | Mod: ANES,,, | Performed by: ANESTHESIOLOGY

## 2019-10-10 PROCEDURE — D9220A PRA ANESTHESIA: Mod: CRNA,,, | Performed by: NURSE ANESTHETIST, CERTIFIED REGISTERED

## 2019-10-10 PROCEDURE — 45380 COLONOSCOPY AND BIOPSY: CPT | Mod: 59,,, | Performed by: INTERNAL MEDICINE

## 2019-10-10 PROCEDURE — 45385 PR COLONOSCOPY,REMV LESN,SNARE: ICD-10-PCS | Mod: ,,, | Performed by: INTERNAL MEDICINE

## 2019-10-10 PROCEDURE — D9220A PRA ANESTHESIA: Mod: ANES,,, | Performed by: ANESTHESIOLOGY

## 2019-10-10 PROCEDURE — 88305 TISSUE EXAM BY PATHOLOGIST: CPT | Performed by: PATHOLOGY

## 2019-10-10 PROCEDURE — 37000008 HC ANESTHESIA 1ST 15 MINUTES: Performed by: INTERNAL MEDICINE

## 2019-10-10 PROCEDURE — 45380 PR COLONOSCOPY,BIOPSY: ICD-10-PCS | Mod: 59,,, | Performed by: INTERNAL MEDICINE

## 2019-10-10 PROCEDURE — D9220A PRA ANESTHESIA: ICD-10-PCS | Mod: CRNA,,, | Performed by: NURSE ANESTHETIST, CERTIFIED REGISTERED

## 2019-10-10 PROCEDURE — 37000009 HC ANESTHESIA EA ADD 15 MINS: Performed by: INTERNAL MEDICINE

## 2019-10-10 PROCEDURE — 27201089 HC SNARE, DISP (ANY): Performed by: INTERNAL MEDICINE

## 2019-10-10 PROCEDURE — 27201012 HC FORCEPS, HOT/COLD, DISP: Performed by: INTERNAL MEDICINE

## 2019-10-10 PROCEDURE — 25000003 PHARM REV CODE 250: Performed by: ANESTHESIOLOGY

## 2019-10-10 PROCEDURE — 88305 TISSUE SPECIMEN TO PATHOLOGY - SURGERY: ICD-10-PCS | Mod: 26,,, | Performed by: PATHOLOGY

## 2019-10-10 PROCEDURE — 45380 COLONOSCOPY AND BIOPSY: CPT | Performed by: INTERNAL MEDICINE

## 2019-10-10 RX ORDER — LIDOCAINE HCL/PF 100 MG/5ML
SYRINGE (ML) INTRAVENOUS
Status: DISCONTINUED | OUTPATIENT
Start: 2019-10-10 | End: 2019-10-10

## 2019-10-10 RX ORDER — SODIUM CHLORIDE 9 MG/ML
INJECTION, SOLUTION INTRAVENOUS CONTINUOUS
Status: DISCONTINUED | OUTPATIENT
Start: 2019-10-10 | End: 2019-10-10 | Stop reason: HOSPADM

## 2019-10-10 RX ORDER — PROPOFOL 10 MG/ML
VIAL (ML) INTRAVENOUS
Status: DISCONTINUED | OUTPATIENT
Start: 2019-10-10 | End: 2019-10-10

## 2019-10-10 RX ORDER — ONDANSETRON HYDROCHLORIDE 4 MG/5ML
4 SOLUTION ORAL ONCE
Status: COMPLETED | OUTPATIENT
Start: 2019-10-10 | End: 2019-10-10

## 2019-10-10 RX ADMIN — Medication 4 MG: at 09:10

## 2019-10-10 RX ADMIN — PROPOFOL 50 MG: 10 INJECTION, EMULSION INTRAVENOUS at 10:10

## 2019-10-10 RX ADMIN — SODIUM CHLORIDE 1000 ML: 0.9 INJECTION, SOLUTION INTRAVENOUS at 09:10

## 2019-10-10 RX ADMIN — PROPOFOL 50 MG: 10 INJECTION, EMULSION INTRAVENOUS at 09:10

## 2019-10-10 RX ADMIN — LIDOCAINE HYDROCHLORIDE 100 MG: 20 INJECTION, SOLUTION INTRAVENOUS at 09:10

## 2019-10-10 NOTE — TELEPHONE ENCOUNTER
----- Message from William Fernández LPN sent at 10/10/2019 11:25 AM CDT -----  Referral sent please call patient to schedule. Thanks William

## 2019-10-10 NOTE — DISCHARGE INSTRUCTIONS
"Discharge Instructions: After Your Surgery/Procedure  Youve just had surgery. During surgery you were given medicine called anesthesia to keep you relaxed and free of pain. After surgery you may have some pain or nausea. This is common. Here are some tips for feeling better and getting well after surgery.     Stay on schedule with your medication.   Going home  Your doctor or nurse will show you how to take care of yourself when you go home. He or she will also answer your questions. Have an adult family member or friend drive you home.      For your safety we recommend these precaution for the first 24 hours after your procedure:  · Do not drive or use heavy equipment.  · Do not make important decisions or sign legal papers.  · Do not drink alcohol.  · Have someone stay with you, if needed. He or she can watch for problems and help keep you safe.  · Your concentration, balance, coordination, and judgement may be impaired for many hours after anesthesia.  Use caution when ambulating or standing up.     · You may feel weak and "washed out" after anesthesia and surgery.      Subtle residual effects of general anesthesia or sedation with regional / local anesthesia can last more than 24 hours.  Rest for the remainder of the day or longer if your Doctor/Surgeon has advised you to do so.  Although you may feel normal within the first 24 hours, your reflexes and mental ability may be impaired without you realizing it.  You may feel dizzy, lightheaded or sleepy for 24 hours or longer.      Be sure to go to all follow-up visits with your doctor. And rest after your surgery for as long as your doctor tells you to.  Coping with pain  If you have pain after surgery, pain medicine will help you feel better. Take it as told, before pain becomes severe. Also, ask your doctor or pharmacist about other ways to control pain. This might be with heat, ice, or relaxation. And follow any other instructions your surgeon or nurse gives " you.  Tips for taking pain medicine  To get the best relief possible, remember these points:  · Pain medicines can upset your stomach. Taking them with a little food may help.  · Most pain relievers taken by mouth need at least 20 to 30 minutes to start to work.  · Taking medicine on a schedule can help you remember to take it. Try to time your medicine so that you can take it before starting an activity. This might be before you get dressed, go for a walk, or sit down for dinner.  · Constipation is a common side effect of pain medicines. Call your doctor before taking any medicines such as laxatives or stool softeners to help ease constipation. Also ask if you should skip any foods. Drinking lots of fluids and eating foods such as fruits and vegetables that are high in fiber can also help. Remember, do not take laxatives unless your surgeon has prescribed them.  · Drinking alcohol and taking pain medicine can cause dizziness and slow your breathing. It can even be deadly. Do not drink alcohol while taking pain medicine.  · Pain medicine can make you react more slowly to things. Do not drive or run machinery while taking pain medicine.  Your health care provider may tell you to take acetaminophen to help ease your pain. Ask him or her how much you are supposed to take each day. Acetaminophen or other pain relievers may interact with your prescription medicines or other over-the-counter (OTC) drugs. Some prescription medicines have acetaminophen and other ingredients. Using both prescription and OTC acetaminophen for pain can cause you to overdose. Read the labels on your OTC medicines with care. This will help you to clearly know the list of ingredients, how much to take, and any warnings. It may also help you not take too much acetaminophen. If you have questions or do not understand the information, ask your pharmacist or health care provider to explain it to you before you take the OTC medicine.  Managing  nausea  Some people have an upset stomach after surgery. This is often because of anesthesia, pain, or pain medicine, or the stress of surgery. These tips will help you handle nausea and eat healthy foods as you get better. If you were on a special food plan before surgery, ask your doctor if you should follow it while you get better. These tips may help:  · Do not push yourself to eat. Your body will tell you when to eat and how much.  · Start off with clear liquids and soup. They are easier to digest.  · Next try semi-solid foods, such as mashed potatoes, applesauce, and gelatin, as you feel ready.  · Slowly move to solid foods. Dont eat fatty, rich, or spicy foods at first.  · Do not force yourself to have 3 large meals a day. Instead eat smaller amounts more often.  · Take pain medicines with a small amount of solid food, such as crackers or toast, to avoid nausea.     Call your surgeon if  · You still have pain an hour after taking medicine. The medicine may not be strong enough.  · You feel too sleepy, dizzy, or groggy. The medicine may be too strong.  · You have side effects like nausea, vomiting, or skin changes, such as rash, itching, or hives.       If you have obstructive sleep apnea  You were given anesthesia medicine during surgery to keep you comfortable and free of pain. After surgery, you may have more apnea spells because of this medicine and other medicines you were given. The spells may last longer than usual.   At home:  · Keep using the continuous positive airway pressure (CPAP) device when you sleep. Unless your health care provider tells you not to, use it when you sleep, day or night. CPAP is a common device used to treat obstructive sleep apnea.  · Talk with your provider before taking any pain medicine, muscle relaxants, or sedatives. Your provider will tell you about the possible dangers of taking these medicines.  © 4763-7008 [employer]. [address] [place] [address]  [place] [address]. All rights reserved. This information is not intended as a substitute for professional medical care. Always follow your healthcare professional's instructions.        Eating a High-Fiber Diet  Fiber is what gives strength and structure to plants. Most grains, beans, vegetables, and fruits contain fiber. Foods rich in fiber are often low in calories and fat, and they fill you up more. They may also reduce your risks for certain health problems. To find out the amount of fiber in canned, packaged, or frozen foods, read the Nutrition Facts label. It tells you how much fiber is in a serving.    Types of fiber and their benefits  There are two types of fiber: insoluble and soluble. They both aid digestion and help you maintain a healthy weight.  · Insoluble fiber. This is found in whole grains, cereals, certain fruits and vegetables such as apple skin, corn, and carrots. Insoluble fiber may prevent constipation and reduce the risk for certain types of cancer.  · Soluble fiber. This type of fiber is in oats, beans, and certain fruits and vegetables such as strawberries and peas. Soluble fiber can reduce cholesterol, which may help lower the risk for heart disease. It also helps control blood sugar levels.  Look for high-fiber foods  Try these foods to add fiber to your diet:  · Whole-grain breads and cereals. Try to eat 6 to 8 ounces a day. Include wheat and oat bran cereals, whole-wheat muffins or toast, and corn tortillas in your meals.  · Fruits. Try to eat 2 cups a day. Apples, oranges, strawberries, pears, and bananas are good sources. (Note: Fruit juice is low in fiber.)  · Vegetables. Try to eat at least 2.5 cups a day. Add asparagus, carrots, broccoli, peas, and corn to your meals.  · Beans. One cup of cooked lentils gives you over 15 grams of fiber. Try navy beans, lentils, and chickpeas.  · Seeds. A small handful of seeds gives you about 3 grams of fiber. Try sunflower seeds.  Keep track of your  fiber  Keep track of how much fiber you eat. Start by reading food labels. Then eat a variety of foods high in fiber. As you begin to eat more fiber, ask your healthcare provider how much water you should be drinking to keep your digestive system working smoothly.  You should aim for a certain amount of fiber in your diet each day. If you are a woman, that amount is between 25 and 28 grams per day. Men should aim for 30 to 33 grams per day. After age 50, your daily fiber needs drop to 22 grams for women and 28 grams for men.  Before you reach for the fiber supplements, think about this. Fiber is found naturally in healthy whole foods. It gives you that feeling of fullness after you eat. Taking fiber supplements or eating fiber-enriched foods will not give you this full feeling.  Your fiber intake is a good measure for the quality of your overall diet. If you are missing out on your daily amount of fiber, you may be lacking other important nutrients as well.  Date Last Reviewed: 5/11/2015 © 2000-2017 [employer]. [address] [place] [address]. All rights reserved. This information is not intended as a substitute for professional medical care. Always follow your healthcare professional's instructions.        Diverticulosis    Diverticulosis means that small pouches have formed in the wall of your large intestine (colon). Most often, this problem causes no symptoms and is common as people age. But the pouches in the colon are at risk of becoming infected. When this happens, the condition is called diverticulitis. Although most people with diverticulosis never develop diverticulitis, it is still not uncommon. Rectal bleeding can also occur and in less common situations, a type of colon inflammation called colitis.  While most people do not have symptoms, some people with diverticulosis may have:  · Abdominal cramps and pain  · Bloating  · Constipation  · Change in bowel habits  Causes  The  exact cause of diverticulosis (and diverticulitis) has not been proved, but a few things are associated with the condition:  · Low-fiber diet  · Constipation  · Lack of exercise  Your healthcare provider will talk with you about how to manage your condition. Diet changes may be all that are needed to help control diverticulosis and prevent progression to diverticulitis. If you develop diverticulitis, you will likely need other treatments.  Home care  You may be told to take fiber supplements daily. Fiber adds bulk to the stool so that it passes through the colon more easily. Stool softeners may be recommended. You may also be given medications for pain relief. Be sure to take all medications as directed.  In the past, people were told to avoid corn, nuts, and seeds. This is no longer necessary.  Follow these guidelines when caring for yourself at home:  · Eat unprocessed foods that are high in fiber. Whole grains, fruits, and vegetables are good choices.  · Drink 6 to 8 glasses of water every day unless your healthcare provider has you limit how much fluid you should have.  · Watch for changes in your bowel movements. Tell your provider if you notice any changes.  · Begin an exercise program. Ask your provider how to get started. Generally, walking is the best.  · Get plenty of rest and sleep.  Follow-up care  Follow up with your healthcare provider, or as advised. Regular visits may be needed to check on your health. Sometimes special procedures such as colonoscopy, are needed after an episode of diverticulitis or blooding. Be sure to keep all your appointments.  If a stool sample was taken, or cultures were done, you should be told if they are positive, or if your treatment needs to be changed. You can call as directed for the results.  If X-rays were done, a radiologist will look at them. You will be told if there is a change in your treatment.  If antibiotics were prescribed, be sure to finish them all.  When to  seek medical advice  Call your healthcare provider right away if any of these occur:  · Fever of 100.4°F (38°C) or higher, or as directed by your healthcare provider  · Severe cramps in the lower left side of the abdomen or pain that is getting worse  · Tenderness in the lower left side of the abdomen or worsening pain throughout the abdomen  · Diarrhea or constipation that doesn't get better within 24 hours  · Nausea and vomiting  · Bleeding from the rectum  Call 911  Call emergency services if any of the following occur:  · Trouble breathing  · Confusion  · Very drowsy or trouble awakening  · Fainting or loss of consciousness  · Rapid heart rate  · Chest pain  Date Last Reviewed: 12/30/2015 © 2000-2017 [employer]. [address] [place] [address], [place] [address]. All rights reserved. This information is not intended as a substitute for professional medical care. Always follow your healthcare professional's instructions.        Hemorrhoids    Hemorrhoids are swollen and inflamed veins inside the rectum and near the anus. The rectum is the last several inches of the colon. The anus is the passage between the rectum and the outside of the body.  Causes  The veins can become swollen due to increased pressure in them. This is most often caused by:  · Chronic constipation or diarrhea  · Straining when having a bowel movement  · Sitting too long on the toilet  · A low-fiber diet  · Pregnancy  Symptoms  · Bleeding from the rectum (this may be noticeable after bowel movements)  · Lump near the anus  · Itching around the anus  · Pain around the anus  There are different types of hemorrhoids. Depending on the type you have and the severity, you may be able to treat yourself at home. In some cases, a procedure may be the best treatment option. Your healthcare provider can tell you more about this, if needed.  Home care  General care  · To get relief from pain or itching, try:  ¨ Topical products. Your healthcare  provider may prescribe or recommend creams, ointments, or pads that can be applied to the hemorrhoid. Use these exactly as directed.  ¨ Medicines. Your healthcare provider may recommend stool softeners, suppositories, or laxatives to help manage constipation. Use these exactly as directed.  ¨ Sitz baths. A sitz bath involves sitting in a few inches of warm bath water. Be careful not to make the water so hot that you burn yourself--test it before sitting in it. Soak for about 10 to 15 minutes a few times a day. This may help relieve pain.  Tips to help prevent hemorrhoids  · Eat more fiber. Fiber adds bulk to stool and absorbs water as it moves through your colon. This makes stool softer and easier to pass.  ¨ Increase the fiber in your diet with more fiber-rich foods. These include fresh fruit, vegetables, and whole grains.  ¨ Take a fiber supplement or bulking agent, if advised to by your provider. These include products such as psyllium or methylcellulose.  · Drink plenty of water, if directed to by your provider. This can help keep stool soft.  · Be more active. Frequent exercise aids digestion and helps prevent constipation. It may also help make bowel movements more regular.  · Dont strain during bowel movements. This can make hemorrhoids more likely. Also, dont sit on the toilet for long periods of time.  Follow-up care  Follow up with your healthcare provider, or as advised. If a culture or imaging tests were done, you will be notified of the results when they are ready. This may take a few days or longer.  When to seek medical advice  Call your healthcare provider right away if any of these occur:  · Increased bleeding from the rectum  · Increased pain around the rectum or anus  · Weakness or dizziness  Call 911  Call 911 or return to the emergency department right away if any of these occur:  · Trouble breathing or swallowing  · Fainting or loss of consciousness  · Unusually fast heart rate  · Vomiting  blood  · Large amounts of blood in stool  Date Last Reviewed: 6/22/2015  © 2501-9634 [email or web address] [address] [place] [address], [place] [address]. All rights reserved. This information is not intended as a substitute for professional medical care. Always follow your healthcare professional's instructions.        Understanding Colon and Rectal Polyps    The colon (also called the large intestine) is a muscular tube that forms the last part of the digestive tract. It absorbs water and stores food waste. The colon is about 4 to 6 feet long. The rectum is the last 6 inches of the colon. The colon and rectum have a smooth lining composed of millions of cells. Changes in these cells can lead to growths in the colon that can become cancerous and should be removed. Multiple tests are available to screen for colon cancer, but the colonoscopy is the most recommended test. During colonoscopy, these polyps can be removed. How often you need this test depends on many things including your condition, your family history, symptoms, and what the findings were at the previous colonoscopy.   When the colon lining changes  Changes that happen in the cells that line the colon or rectum can lead to growths called polyps. Over a period of years, polyps can turn cancerous. Removing polyps early may prevent cancer from ever forming.  Polyps  Polyps are fleshy clumps of tissue that form on the lining of the colon or rectum. Small polyps are usually benign (not cancerous). However, over time, cells in a polyp can change and become cancerous. Certain types of polyps known as adenomatous polyps are premalignant. The risk for invasive cancer increases with the size of the polyp and certain cell and gene features. This means that they can become cancerous if they're not removed. Hyperplastic polyps are benign. They can grow quite large and not turn cancerous.   Cancer  Almost all colorectal cancers start when polyp cells begin growing  abnormally. As a cancerous tumor grows, it may involve more and more of the colon or rectum. In time, cancer can also grow beyond the colon or rectum and spread to nearby organs or to glands called lymph nodes. The cells can also travel to other parts of the body. This is known as metastasis. The earlier a cancerous tumor is removed, the better the chance of preventing its spread.    Date Last Reviewed: 8/1/2016  © 8701-1186 [employer]. [address] [place] [address], [place] [address]. All rights reserved. This information is not intended as a substitute for professional medical care. Always follow your healthcare professional's instructions.

## 2019-10-10 NOTE — ANESTHESIA POSTPROCEDURE EVALUATION
Anesthesia Post Evaluation    Patient: Ayla Dela Cruz    Procedure(s) Performed: Procedure(s) (LRB):  COLONOSCOPY (N/A)    Final Anesthesia Type: general  Patient location during evaluation: PACU  Patient participation: Yes- Able to Participate  Level of consciousness: awake and alert  Post-procedure vital signs: reviewed and stable  Pain management: adequate  Airway patency: patent  PONV status at discharge: No PONV  Anesthetic complications: no      Cardiovascular status: hemodynamically stable  Respiratory status: unassisted and room air  Hydration status: euvolemic  Follow-up not needed.          Vitals Value Taken Time   /70 10/10/2019 10:25 AM   Temp 36.6 °C (97.9 °F) 10/10/2019  9:05 AM   Pulse 66 10/10/2019 10:30 AM   Resp 17 10/10/2019 10:30 AM   SpO2 96 % 10/10/2019 10:30 AM         No case tracking events are documented in the log.      Pain/Chalino Score: Chalino Score: 9 (10/10/2019 10:30 AM)

## 2019-10-10 NOTE — ANESTHESIA PREPROCEDURE EVALUATION
10/10/2019  Ayla Dela Cruz is a 74 y.o., female.    Anesthesia Evaluation    I have reviewed the Patient Summary Reports.    I have reviewed the Nursing Notes.   I have reviewed the Medications.     Review of Systems  Anesthesia Hx:  No problems with previous Anesthesia    Social:  Non-Smoker    Hematology/Oncology:         -- Cancer in past history:   EENT/Dental:EENT/Dental Normal   Cardiovascular:   Hypertension Dysrhythmias CHF AVILA    Pulmonary:   Pneumonia Asthma Shortness of breath    Hepatic/GI:   GERD    Musculoskeletal:   Arthritis     Neurological:   TIA, CVA Neuromuscular Disease,    Psych:   Psychiatric History          Physical Exam  General:  Well nourished    Airway/Jaw/Neck:  Airway Findings: Mouth Opening: Normal Tongue: Normal  General Airway Assessment: Adult  Mallampati: III        Eyes/Ears/Nose:  EYES/EARS/NOSE FINDINGS: Normal   Dental:  DENTAL FINDINGS: Normal   Chest/Lungs:  Chest/Lungs Findings: Clear to auscultation, Normal Respiratory Rate     Heart/Vascular:  Heart Findings: Rate: Normal  Rhythm: Regular Rhythm        Mental Status:  Mental Status Findings:  Cooperative, Alert and Oriented         Anesthesia Plan  Type of Anesthesia, risks & benefits discussed:  Anesthesia Type:  general  Patient's Preference:   Intra-op Monitoring Plan: standard ASA monitors  Intra-op Monitoring Plan Comments:   Post Op Pain Control Plan:   Post Op Pain Control Plan Comments:   Induction:   IV  Beta Blocker:  Patient is on a Beta-Blocker and has received one dose within the past 24 hours (No further documentation required).       Informed Consent: Patient understands risks and agrees with Anesthesia plan.  Questions answered. Anesthesia consent signed with patient.  ASA Score: 3     Day of Surgery Review of History & Physical: I have interviewed and examined the patient. I have reviewed  the patient's H&P dated:  There are no significant changes.  H&P update referred to the surgeon.         Ready For Surgery From Anesthesia Perspective.

## 2019-10-10 NOTE — TRANSFER OF CARE
"Anesthesia Transfer of Care Note    Patient: Ayla Dela Cruz    Procedure(s) Performed: Procedure(s) (LRB):  COLONOSCOPY (N/A)    Patient location: PACU    Anesthesia Type: general    Transport from OR: Transported from OR on 2-3 L/min O2 by NC with adequate spontaneous ventilation    Post pain: adequate analgesia    Post assessment: no apparent anesthetic complications and tolerated procedure well    Post vital signs: stable    Level of consciousness: sedated    Nausea/Vomiting: no nausea/vomiting    Complications: none    Transfer of care protocol was followed      Last vitals:   Visit Vitals  BP (!) 180/80   Pulse 80   Temp 36.6 °C (97.9 °F) (Skin)   Resp 20   Ht 5' 7" (1.702 m)   Wt 57.6 kg (127 lb)   Breastfeeding? No   BMI 19.89 kg/m²     "

## 2019-10-10 NOTE — TELEPHONE ENCOUNTER
I called patient and scheduled her on Tuesday, 10/29/19 at 1:45pm with Dr. Bonilla in [place].  Bishnu

## 2019-10-10 NOTE — PROVATION PATIENT INSTRUCTIONS
Discharge Summary/Instructions after an Endoscopic Procedure  Patient Name: Ayla Dela Cruz  Patient MRN: [id number]  Patient YOB: 1945  Thursday, October 10, 2019  Alex Schwarz MD  RESTRICTIONS:  During your procedure today, you received medications for sedation.  These   medications may affect your judgment, balance and coordination.  Therefore,   for 24 hours, you have the following restrictions:   - DO NOT drive a car, operate machinery, make legal/financial decisions,   sign important papers or drink alcohol.    ACTIVITY:  Today: no heavy lifting, straining or running due to procedural   sedation/anesthesia.  The following day: return to full activity including work.  DIET:  Eat and drink normally unless instructed otherwise.     TREATMENT FOR COMMON SIDE EFFECTS:  - Mild abdominal pain, nausea, belching, bloating or excessive gas:  rest,   eat lightly and use a heating pad.  - Sore Throat: treat with throat lozenges and/or gargle with warm salt   water.  - Because air was used during the procedure, expelling large amounts of air   from your rectum or belching is normal.  - If a bowel prep was taken, you may not have a bowel movement for 1-3 days.    This is normal.  SYMPTOMS TO WATCH FOR AND REPORT TO YOUR PHYSICIAN:  1. Abdominal pain or bloating, other than gas cramps.  2. Chest pain.  3. Back pain.  4. Signs of infection such as: chills or fever occurring within 24 hours   after the procedure.  5. Rectal bleeding, which would show as bright red, maroon, or black stools.   (A tablespoon of blood from the rectum is not serious, especially if   hemorrhoids are present.)  6. Vomiting.  7. Weakness or dizziness.  GO DIRECTLY TO THE NEAREST EMERGENCY ROOM IF YOU HAVE ANY OF THE FOLLOWING:      Difficulty breathing              Chills and/or fever over 101 F   Persistent vomiting and/or vomiting blood   Severe abdominal pain   Severe chest pain   Black, tarry stools   Bleeding- more than one  tablespoon   Any other symptom or condition that you feel may need urgent attention  Your doctor recommends these additional instructions:  If any biopsies were taken, your doctors clinic will contact you in 1 to 2   weeks with any results.  - Patient has a contact number available for emergencies.  The signs and   symptoms of potential delayed complications were discussed with the   patient.  Return to normal activities tomorrow.  Written discharge   instructions were provided to the patient.   - High fiber diet.   - Continue present medications.   - Resume Eliquis (apixaban) at prior dose today.   - Await pathology results.   - Repeat colonoscopy in 3 years for surveillance.   - Discharge patient to home (ambulatory).   - Return to my office PRN.   - Refer to a colo-rectal surgeon at appointment to be scheduled.  For questions, problems or results please call your physician - Alex Schwarz MD at Work:  [phone].  [facility], EMERGENCY ROOM PHONE NUMBER: [phone]  IF A COMPLICATION OR EMERGENCY SITUATION ARISES AND YOU ARE UNABLE TO REACH   YOUR PHYSICIAN - GO DIRECTLY TO THE EMERGENCY ROOM.  Alex Schwarz MD  10/10/2019 10:23:26 AM  This report has been verified and signed electronically.  [employer]

## 2019-10-11 VITALS
BODY MASS INDEX: 19.93 KG/M2 | HEART RATE: 67 BPM | WEIGHT: 127 LBS | TEMPERATURE: 97 F | OXYGEN SATURATION: 94 % | RESPIRATION RATE: 18 BRPM | SYSTOLIC BLOOD PRESSURE: 147 MMHG | DIASTOLIC BLOOD PRESSURE: 75 MMHG | HEIGHT: 67 IN

## 2019-10-13 ENCOUNTER — HOSPITAL ENCOUNTER (EMERGENCY)
Facility: HOSPITAL | Age: 74
Discharge: HOME | End: 2019-10-14
Attending: EMERGENCY MEDICINE
Payer: MEDICARE

## 2019-10-13 DIAGNOSIS — R11.2 NAUSEA AND VOMITING, INTRACTABILITY OF VOMITING NOT SPECIFIED, UNSPECIFIED VOMITING TYPE: ICD-10-CM

## 2019-10-13 DIAGNOSIS — R10.9 RIGHT SIDED ABDOMINAL PAIN: Primary | ICD-10-CM

## 2019-10-13 DIAGNOSIS — S39.011A STRAIN OF ABDOMINAL WALL, INITIAL ENCOUNTER: ICD-10-CM

## 2019-10-13 DIAGNOSIS — R00.0 TACHYCARDIA: ICD-10-CM

## 2019-10-13 PROCEDURE — 93010 EKG 12-LEAD: ICD-10-PCS | Mod: ,,, | Performed by: INTERNAL MEDICINE

## 2019-10-13 PROCEDURE — 99285 EMERGENCY DEPT VISIT HI MDM: CPT | Mod: 25

## 2019-10-13 PROCEDURE — 93010 ELECTROCARDIOGRAM REPORT: CPT | Mod: ,,, | Performed by: INTERNAL MEDICINE

## 2019-10-13 PROCEDURE — 93005 ELECTROCARDIOGRAM TRACING: CPT

## 2019-10-13 PROCEDURE — 96374 THER/PROPH/DIAG INJ IV PUSH: CPT

## 2019-10-13 PROCEDURE — 96361 HYDRATE IV INFUSION ADD-ON: CPT

## 2019-10-13 RX ORDER — ACETAMINOPHEN 325 MG/1
650 TABLET ORAL
Status: COMPLETED | OUTPATIENT
Start: 2019-10-14 | End: 2019-10-14

## 2019-10-13 RX ORDER — ONDANSETRON 2 MG/ML
4 INJECTION INTRAMUSCULAR; INTRAVENOUS
Status: COMPLETED | OUTPATIENT
Start: 2019-10-14 | End: 2019-10-14

## 2019-10-14 ENCOUNTER — ANESTHESIA (OUTPATIENT)
Dept: ENDOSCOPY | Facility: HOSPITAL | Age: 74
End: 2019-10-14
Payer: MEDICARE

## 2019-10-14 ENCOUNTER — ANESTHESIA EVENT (OUTPATIENT)
Dept: ENDOSCOPY | Facility: HOSPITAL | Age: 74
End: 2019-10-14
Payer: MEDICARE

## 2019-10-14 ENCOUNTER — HOSPITAL ENCOUNTER (OUTPATIENT)
Facility: HOSPITAL | Age: 74
Discharge: HOME | End: 2019-10-14
Attending: INTERNAL MEDICINE | Admitting: HOSPITALIST
Payer: MEDICARE

## 2019-10-14 VITALS
SYSTOLIC BLOOD PRESSURE: 161 MMHG | DIASTOLIC BLOOD PRESSURE: 74 MMHG | WEIGHT: 133 LBS | TEMPERATURE: 98 F | RESPIRATION RATE: 20 BRPM | HEART RATE: 85 BPM | OXYGEN SATURATION: 90 % | BODY MASS INDEX: 20.88 KG/M2 | HEIGHT: 67 IN

## 2019-10-14 DIAGNOSIS — K31.7 GASTRIC POLYPS: ICD-10-CM

## 2019-10-14 DIAGNOSIS — N12 PYELONEPHRITIS: Primary | ICD-10-CM

## 2019-10-14 DIAGNOSIS — K21.9 GERD (GASTROESOPHAGEAL REFLUX DISEASE): ICD-10-CM

## 2019-10-14 DIAGNOSIS — K29.70 GASTRITIS, PRESENCE OF BLEEDING UNSPECIFIED, UNSPECIFIED CHRONICITY, UNSPECIFIED GASTRITIS TYPE: ICD-10-CM

## 2019-10-14 DIAGNOSIS — K44.9 HIATAL HERNIA: ICD-10-CM

## 2019-10-14 PROBLEM — N28.0 RENAL INFARCT: Status: ACTIVE | Noted: 2019-10-14

## 2019-10-14 PROBLEM — R74.01 TRANSAMINITIS: Status: ACTIVE | Noted: 2019-10-14

## 2019-10-14 PROBLEM — J45.20 MILD INTERMITTENT ASTHMA WITHOUT COMPLICATION: Status: ACTIVE | Noted: 2019-10-14

## 2019-10-14 LAB
ALBUMIN SERPL BCP-MCNC: 3.5 G/DL (ref 3.5–5.2)
ALP SERPL-CCNC: 59 U/L (ref 55–135)
ALT SERPL W/O P-5'-P-CCNC: 34 U/L (ref 10–44)
ANION GAP SERPL CALC-SCNC: 10 MMOL/L (ref 8–16)
AST SERPL-CCNC: 95 U/L (ref 10–40)
BASOPHILS # BLD AUTO: 0.05 K/UL (ref 0–0.2)
BASOPHILS NFR BLD: 0.3 % (ref 0–1.9)
BILIRUB SERPL-MCNC: 1.5 MG/DL (ref 0.1–1)
BILIRUB UR QL STRIP: NEGATIVE
BUN SERPL-MCNC: 12 MG/DL (ref 8–23)
CALCIUM SERPL-MCNC: 9.3 MG/DL (ref 8.7–10.5)
CHLORIDE SERPL-SCNC: 106 MMOL/L (ref 95–110)
CLARITY UR: CLEAR
CO2 SERPL-SCNC: 22 MMOL/L (ref 23–29)
COLOR UR: YELLOW
CREAT SERPL-MCNC: 0.9 MG/DL (ref 0.5–1.4)
DIFFERENTIAL METHOD: ABNORMAL
EOSINOPHIL # BLD AUTO: 0.1 K/UL (ref 0–0.5)
EOSINOPHIL NFR BLD: 0.8 % (ref 0–8)
ERYTHROCYTE [DISTWIDTH] IN BLOOD BY AUTOMATED COUNT: 14.6 % (ref 11.5–14.5)
EST. GFR  (AFRICAN AMERICAN): >60 ML/MIN/1.73 M^2
EST. GFR  (NON AFRICAN AMERICAN): >60 ML/MIN/1.73 M^2
GLUCOSE SERPL-MCNC: 119 MG/DL (ref 70–110)
GLUCOSE UR QL STRIP: NEGATIVE
HCT VFR BLD AUTO: 36.1 % (ref 37–48.5)
HGB BLD-MCNC: 11.4 G/DL (ref 12–16)
HGB UR QL STRIP: ABNORMAL
IMM GRANULOCYTES # BLD AUTO: 0.12 K/UL (ref 0–0.04)
KETONES UR QL STRIP: NEGATIVE
LEUKOCYTE ESTERASE UR QL STRIP: NEGATIVE
LIPASE SERPL-CCNC: 9 U/L (ref 4–60)
LYMPHOCYTES # BLD AUTO: 0.7 K/UL (ref 1–4.8)
LYMPHOCYTES NFR BLD: 4.3 % (ref 18–48)
MCH RBC QN AUTO: 30.1 PG (ref 27–31)
MCHC RBC AUTO-ENTMCNC: 31.6 G/DL (ref 32–36)
MCV RBC AUTO: 95 FL (ref 82–98)
MONOCYTES # BLD AUTO: 2 K/UL (ref 0.3–1)
MONOCYTES NFR BLD: 12.7 % (ref 4–15)
NEUTROPHILS # BLD AUTO: 12.5 K/UL (ref 1.8–7.7)
NEUTROPHILS NFR BLD: 81.1 % (ref 38–73)
NITRITE UR QL STRIP: NEGATIVE
NRBC BLD-RTO: 0 /100 WBC
PH UR STRIP: 6 [PH] (ref 5–8)
PLATELET # BLD AUTO: 282 K/UL (ref 150–350)
PMV BLD AUTO: 9 FL (ref 9.2–12.9)
POTASSIUM SERPL-SCNC: 3.5 MMOL/L (ref 3.5–5.1)
PROT SERPL-MCNC: 6 G/DL (ref 6–8.4)
PROT UR QL STRIP: ABNORMAL
RBC # BLD AUTO: 3.79 M/UL (ref 4–5.4)
SODIUM SERPL-SCNC: 138 MMOL/L (ref 136–145)
SP GR UR STRIP: 1.01 (ref 1–1.03)
URN SPEC COLLECT METH UR: ABNORMAL
UROBILINOGEN UR STRIP-ACNC: NEGATIVE EU/DL
WBC # BLD AUTO: 15.35 K/UL (ref 3.9–12.7)

## 2019-10-14 PROCEDURE — 27201089 HC SNARE, DISP (ANY): Performed by: INTERNAL MEDICINE

## 2019-10-14 PROCEDURE — 63600175 PHARM REV CODE 636 W HCPCS: Performed by: INTERNAL MEDICINE

## 2019-10-14 PROCEDURE — 25000003 PHARM REV CODE 250: Performed by: NURSE PRACTITIONER

## 2019-10-14 PROCEDURE — G0378 HOSPITAL OBSERVATION PER HR: HCPCS

## 2019-10-14 PROCEDURE — 43239 PR EGD, FLEX, W/BIOPSY, SGL/MULTI: ICD-10-PCS | Mod: 59,,, | Performed by: INTERNAL MEDICINE

## 2019-10-14 PROCEDURE — 27201012 HC FORCEPS, HOT/COLD, DISP: Performed by: INTERNAL MEDICINE

## 2019-10-14 PROCEDURE — D9220A PRA ANESTHESIA: Mod: ANES,,, | Performed by: ANESTHESIOLOGY

## 2019-10-14 PROCEDURE — 43239 EGD BIOPSY SINGLE/MULTIPLE: CPT | Mod: 59,,, | Performed by: INTERNAL MEDICINE

## 2019-10-14 PROCEDURE — 83690 ASSAY OF LIPASE: CPT

## 2019-10-14 PROCEDURE — 63600175 PHARM REV CODE 636 W HCPCS: Performed by: EMERGENCY MEDICINE

## 2019-10-14 PROCEDURE — 99204 PR OFFICE/OUTPT VISIT, NEW, LEVL IV, 45-59 MIN: ICD-10-PCS | Mod: ,,, | Performed by: UROLOGY

## 2019-10-14 PROCEDURE — 63600175 PHARM REV CODE 636 W HCPCS: Performed by: NURSE PRACTITIONER

## 2019-10-14 PROCEDURE — 43251 EGD REMOVE LESION SNARE: CPT | Mod: ,,, | Performed by: INTERNAL MEDICINE

## 2019-10-14 PROCEDURE — 43251 PR EGD, FLEX, W/REMOVAL, TUMOR/POLYP/LESION(S), SNARE: ICD-10-PCS | Mod: ,,, | Performed by: INTERNAL MEDICINE

## 2019-10-14 PROCEDURE — 43251 EGD REMOVE LESION SNARE: CPT | Performed by: INTERNAL MEDICINE

## 2019-10-14 PROCEDURE — D9220A PRA ANESTHESIA: ICD-10-PCS | Mod: CRNA,,, | Performed by: NURSE ANESTHETIST, CERTIFIED REGISTERED

## 2019-10-14 PROCEDURE — 88305 TISSUE SPECIMEN TO PATHOLOGY - SURGERY: ICD-10-PCS | Mod: 26,,, | Performed by: PATHOLOGY

## 2019-10-14 PROCEDURE — 43239 EGD BIOPSY SINGLE/MULTIPLE: CPT | Performed by: INTERNAL MEDICINE

## 2019-10-14 PROCEDURE — D9220A PRA ANESTHESIA: ICD-10-PCS | Mod: ANES,,, | Performed by: ANESTHESIOLOGY

## 2019-10-14 PROCEDURE — 81003 URINALYSIS AUTO W/O SCOPE: CPT

## 2019-10-14 PROCEDURE — 99204 OFFICE O/P NEW MOD 45 MIN: CPT | Mod: ,,, | Performed by: UROLOGY

## 2019-10-14 PROCEDURE — 88305 TISSUE EXAM BY PATHOLOGIST: CPT | Performed by: PATHOLOGY

## 2019-10-14 PROCEDURE — 25500020 PHARM REV CODE 255: Performed by: EMERGENCY MEDICINE

## 2019-10-14 PROCEDURE — 25000003 PHARM REV CODE 250: Performed by: EMERGENCY MEDICINE

## 2019-10-14 PROCEDURE — 37000008 HC ANESTHESIA 1ST 15 MINUTES: Performed by: INTERNAL MEDICINE

## 2019-10-14 PROCEDURE — 36415 COLL VENOUS BLD VENIPUNCTURE: CPT

## 2019-10-14 PROCEDURE — 85025 COMPLETE CBC W/AUTO DIFF WBC: CPT

## 2019-10-14 PROCEDURE — D9220A PRA ANESTHESIA: Mod: CRNA,,, | Performed by: NURSE ANESTHETIST, CERTIFIED REGISTERED

## 2019-10-14 PROCEDURE — 37000009 HC ANESTHESIA EA ADD 15 MINS: Performed by: INTERNAL MEDICINE

## 2019-10-14 PROCEDURE — 63600175 PHARM REV CODE 636 W HCPCS: Performed by: NURSE ANESTHETIST, CERTIFIED REGISTERED

## 2019-10-14 PROCEDURE — 80053 COMPREHEN METABOLIC PANEL: CPT

## 2019-10-14 RX ORDER — LIDOCAINE 50 MG/G
1 PATCH TOPICAL
Status: DISCONTINUED | OUTPATIENT
Start: 2019-10-14 | End: 2019-10-14 | Stop reason: HOSPADM

## 2019-10-14 RX ORDER — LIDOCAINE 50 MG/G
1 PATCH TOPICAL DAILY
Qty: 7 PATCH | Refills: 0 | Status: SHIPPED | OUTPATIENT
Start: 2019-10-14 | End: 2019-10-21

## 2019-10-14 RX ORDER — IBUPROFEN 200 MG
24 TABLET ORAL
Status: DISCONTINUED | OUTPATIENT
Start: 2019-10-14 | End: 2019-10-14 | Stop reason: HOSPADM

## 2019-10-14 RX ORDER — POTASSIUM CHLORIDE 20 MEQ/15ML
40 SOLUTION ORAL
Status: DISCONTINUED | OUTPATIENT
Start: 2019-10-14 | End: 2019-10-14 | Stop reason: HOSPADM

## 2019-10-14 RX ORDER — DAPSONE 25 MG/1
50 TABLET ORAL DAILY
Status: DISCONTINUED | OUTPATIENT
Start: 2019-10-14 | End: 2019-10-14 | Stop reason: HOSPADM

## 2019-10-14 RX ORDER — FLUTICASONE PROPIONATE 50 MCG
1 SPRAY, SUSPENSION (ML) NASAL DAILY
Status: DISCONTINUED | OUTPATIENT
Start: 2019-10-14 | End: 2019-10-14 | Stop reason: HOSPADM

## 2019-10-14 RX ORDER — SODIUM CHLORIDE 9 MG/ML
INJECTION, SOLUTION INTRAVENOUS CONTINUOUS
Status: DISCONTINUED | OUTPATIENT
Start: 2019-10-14 | End: 2019-10-14 | Stop reason: HOSPADM

## 2019-10-14 RX ORDER — SODIUM CHLORIDE 0.9 % (FLUSH) 0.9 %
10 SYRINGE (ML) INJECTION
Status: DISCONTINUED | OUTPATIENT
Start: 2019-10-14 | End: 2019-10-14 | Stop reason: HOSPADM

## 2019-10-14 RX ORDER — SULFAMETHOXAZOLE AND TRIMETHOPRIM 800; 160 MG/1; MG/1
1 TABLET ORAL 2 TIMES DAILY
Qty: 20 TABLET | Refills: 0 | Status: SHIPPED | OUTPATIENT
Start: 2019-10-14 | End: 2019-10-22 | Stop reason: SINTOL

## 2019-10-14 RX ORDER — GLUCAGON 1 MG
1 KIT INJECTION
Status: DISCONTINUED | OUTPATIENT
Start: 2019-10-14 | End: 2019-10-14 | Stop reason: HOSPADM

## 2019-10-14 RX ORDER — METOPROLOL TARTRATE 50 MG/1
50 TABLET ORAL 2 TIMES DAILY
Status: DISCONTINUED | OUTPATIENT
Start: 2019-10-14 | End: 2019-10-14 | Stop reason: HOSPADM

## 2019-10-14 RX ORDER — PROPOFOL 10 MG/ML
VIAL (ML) INTRAVENOUS
Status: DISCONTINUED | OUTPATIENT
Start: 2019-10-14 | End: 2019-10-14

## 2019-10-14 RX ORDER — SODIUM,POTASSIUM PHOSPHATES 280-250MG
2 POWDER IN PACKET (EA) ORAL
Status: DISCONTINUED | OUTPATIENT
Start: 2019-10-14 | End: 2019-10-14 | Stop reason: HOSPADM

## 2019-10-14 RX ORDER — LANOLIN ALCOHOL/MO/W.PET/CERES
800 CREAM (GRAM) TOPICAL
Status: DISCONTINUED | OUTPATIENT
Start: 2019-10-14 | End: 2019-10-14 | Stop reason: HOSPADM

## 2019-10-14 RX ORDER — KETOROLAC TROMETHAMINE 30 MG/ML
15 INJECTION, SOLUTION INTRAMUSCULAR; INTRAVENOUS ONCE
Status: COMPLETED | OUTPATIENT
Start: 2019-10-14 | End: 2019-10-14

## 2019-10-14 RX ORDER — ROSUVASTATIN CALCIUM 20 MG/1
40 TABLET, COATED ORAL NIGHTLY
Status: DISCONTINUED | OUTPATIENT
Start: 2019-10-14 | End: 2019-10-14 | Stop reason: HOSPADM

## 2019-10-14 RX ORDER — SPIRONOLACTONE 25 MG/1
25 TABLET ORAL DAILY
Status: DISCONTINUED | OUTPATIENT
Start: 2019-10-14 | End: 2019-10-14 | Stop reason: HOSPADM

## 2019-10-14 RX ORDER — IBUPROFEN 200 MG
16 TABLET ORAL
Status: DISCONTINUED | OUTPATIENT
Start: 2019-10-14 | End: 2019-10-14 | Stop reason: HOSPADM

## 2019-10-14 RX ORDER — ONDANSETRON 4 MG/1
4 TABLET, FILM COATED ORAL EVERY 8 HOURS PRN
Qty: 9 TABLET | Refills: 0 | Status: SHIPPED | OUTPATIENT
Start: 2019-10-14 | End: 2019-10-17

## 2019-10-14 RX ORDER — GABAPENTIN 300 MG/1
300 CAPSULE ORAL 2 TIMES DAILY
Status: DISCONTINUED | OUTPATIENT
Start: 2019-10-14 | End: 2019-10-14 | Stop reason: HOSPADM

## 2019-10-14 RX ORDER — ONDANSETRON 2 MG/ML
8 INJECTION INTRAMUSCULAR; INTRAVENOUS EVERY 8 HOURS PRN
Status: DISCONTINUED | OUTPATIENT
Start: 2019-10-14 | End: 2019-10-14 | Stop reason: HOSPADM

## 2019-10-14 RX ORDER — LIDOCAINE HCL/PF 100 MG/5ML
SYRINGE (ML) INTRAVENOUS
Status: DISCONTINUED | OUTPATIENT
Start: 2019-10-14 | End: 2019-10-14

## 2019-10-14 RX ORDER — DORZOLAMIDE HYDROCHLORIDE AND TIMOLOL MALEATE 20; 5 MG/ML; MG/ML
1 SOLUTION/ DROPS OPHTHALMIC 2 TIMES DAILY
Status: DISCONTINUED | OUTPATIENT
Start: 2019-10-14 | End: 2019-10-14 | Stop reason: HOSPADM

## 2019-10-14 RX ORDER — SUCRALFATE 1 G/1
1 TABLET ORAL 4 TIMES DAILY
Qty: 40 TABLET | Refills: 0 | Status: SHIPPED | OUTPATIENT
Start: 2019-10-14 | End: 2019-10-24

## 2019-10-14 RX ADMIN — SODIUM CHLORIDE 1000 ML: 0.9 INJECTION, SOLUTION INTRAVENOUS at 12:10

## 2019-10-14 RX ADMIN — METOPROLOL TARTRATE 50 MG: 50 TABLET, FILM COATED ORAL at 01:10

## 2019-10-14 RX ADMIN — PROPOFOL 30 MG: 10 INJECTION, EMULSION INTRAVENOUS at 08:10

## 2019-10-14 RX ADMIN — GABAPENTIN 300 MG: 300 CAPSULE ORAL at 01:10

## 2019-10-14 RX ADMIN — IOHEXOL 100 ML: 350 INJECTION, SOLUTION INTRAVENOUS at 01:10

## 2019-10-14 RX ADMIN — ONDANSETRON 4 MG: 2 INJECTION INTRAMUSCULAR; INTRAVENOUS at 12:10

## 2019-10-14 RX ADMIN — SODIUM CHLORIDE 1000 ML: 0.9 INJECTION, SOLUTION INTRAVENOUS at 10:10

## 2019-10-14 RX ADMIN — SPIRONOLACTONE 25 MG: 25 TABLET ORAL at 01:10

## 2019-10-14 RX ADMIN — PROPOFOL 80 MG: 10 INJECTION, EMULSION INTRAVENOUS at 08:10

## 2019-10-14 RX ADMIN — LIDOCAINE 1 PATCH: 50 PATCH TOPICAL at 02:10

## 2019-10-14 RX ADMIN — SODIUM CHLORIDE 1000 ML: 0.9 INJECTION, SOLUTION INTRAVENOUS at 07:10

## 2019-10-14 RX ADMIN — APIXABAN 5 MG: 2.5 TABLET, FILM COATED ORAL at 01:10

## 2019-10-14 RX ADMIN — LIDOCAINE HYDROCHLORIDE 80 MG: 20 INJECTION, SOLUTION INTRAVENOUS at 08:10

## 2019-10-14 RX ADMIN — KETOROLAC TROMETHAMINE 15 MG: 30 INJECTION, SOLUTION INTRAMUSCULAR at 01:10

## 2019-10-14 RX ADMIN — ACETAMINOPHEN 650 MG: 325 TABLET ORAL at 12:10

## 2019-10-14 NOTE — PLAN OF CARE
Received pt to floor from ENDO. Pt in NAD at this time. Able to transfer from w/c to bed with standby assist. VSS. AAOX4. Spouse at bedside. Safety and fall precautions reviewed with pt. Pt verbalized understandings.

## 2019-10-14 NOTE — OR NURSING
Dr Christian requesting pt Ms Dela Cruz admitted to observation unit for Right kidney infarct vs pyleniphritis. Pt seen in ER yesterday abn lab and ct scan, and pt having pain. Dr Christian spoke to Dr Alas in the OR area and reports pt needs to be admitted to observation for IV antibiotics.  I spoke to Dr Wilson at approx. 0845 and she will admit pt.

## 2019-10-14 NOTE — SUBJECTIVE & OBJECTIVE
Past Medical History:   Diagnosis Date    Anticoagulant long-term use     Anxiety     Arthritis     Atrial fibrillation     Cancer     skin    CHF (congestive heart failure)     Depression     DVT (deep venous thrombosis)     GERD (gastroesophageal reflux disease)     Glaucoma (increased eye pressure)     Hyperlipidemia     diet controlled    Hypertension     Interstitial lung disease     Mild persistent asthma without complication 11/12/2018    Pneumonia 1/31/2014    Stroke 6-3-14    Stroke     TIA (transient ischemic attack)     TIA (transient ischemic attack)        Past Surgical History:   Procedure Laterality Date    2 heart ablations      3 in total    bilateral cataracts      CHOLECYSTECTOMY      COLONOSCOPY N/A 1/19/2017    Procedure: COLONOSCOPY and EGD;  Surgeon: Jonathan Schultz MD;  Location: [facility] ENDO;  Service: Endoscopy;  Laterality: N/A;    COLONOSCOPY N/A 10/10/2019    Procedure: COLONOSCOPY;  Surgeon: Alex Christian MD;  Location: [facility] ENDO;  Service: Endoscopy;  Laterality: N/A;    INJECTION OF ANESTHETIC AGENT AROUND MEDIAL BRANCH NERVES INNERVATING CERVICAL FACET JOINT Right 6/7/2018    Procedure: BLOCK, NERVE, FACET JOINT, MEDIAL BRANCH, CERVICAL;  Surgeon: Galo Clancy MD;  Location: [facility] OR;  Service: Pain Management;  Laterality: Right;  C4, 5, 6    OPEN REDUCTION AND INTERNAL FIXATION (ORIF) OF INJURY OF ANKLE Right 11/1/2018    Procedure: ORIF, ANKLE;  Surgeon: Wilfredo Aguero MD;  Location: [facility] OR;  Service: Orthopedics;  Laterality: Right;    pyloristenosis      RADIOFREQUENCY ABLATION OF LUMBAR MEDIAL BRANCH NERVE AT SINGLE LEVEL Bilateral 9/21/2018    Procedure: RADIOFREQUENCY ABLATION, NERVE, SPINAL, LUMBAR, MEDIAL BRANCH, 1 LEVEL;  Surgeon: Galo Clancy MD;  Location: [facility] OR;  Service: Pain Management;  Laterality: Bilateral;  L3, 4, 5    RADIOFREQUENCY ABLATION OF LUMBAR MEDIAL BRANCH NERVE AT SINGLE LEVEL Bilateral 2/19/2019    Procedure:  Radiofrequency Ablation, Nerve, Spinal, Lumbar, Medial Branch, 1 Level;  Surgeon: Galo Clancy MD;  Location: [facility] OR;  Service: Pain Management;  Laterality: Bilateral;  L3, 4, 5     RADIOFREQUENCY THERMAL COAGULATION OF MEDIAL BRANCH OF POSTERIOR RAMUS OF CERVICAL SPINAL NERVE Right 7/3/2018    Procedure: RADIOFREQUENCY THERMAL COAGULATION, NERVE, SPINAL, CERVICAL, MEDIAL BRANCH OF POSTERIOR RAMUS;  Surgeon: Galo Clancy MD;  Location: [facility] OR;  Service: Pain Management;  Laterality: Right;  C4,5,6 - Burned at 80 degrees C. for 75 seconds each site    RADIOFREQUENCY THERMAL COAGULATION OF MEDIAL BRANCH OF POSTERIOR RAMUS OF CERVICAL SPINAL NERVE Right 7/23/2019    Procedure: RADIOFREQUENCY THERMAL COAGULATION, NERVE, SPINAL, CERVICAL, POSTERIOR RAMUS, MEDIAL BRANCH;  Surgeon: Galo Clancy MD;  Location: [facility] OR;  Service: Pain Management;  Laterality: Right;  C4,5,6    RADIOFREQUENCY THERMOCOAGULATION Bilateral 9/10/2019    Procedure: RADIOFREQUENCY THERMAL COAGULATION LUMBAR;  Surgeon: Galo Clancy MD;  Location: [facility] OR;  Service: Pain Management;  Laterality: Bilateral;  L3,4,5 - Burned at 80 degrees C. for 60 seconds x 2 each site    skin cancer removal       TONSILLECTOMY         Review of patient's allergies indicates:   Allergen Reactions    Atorvastatin      Other reaction(s): Joint pain    Augmentin [amoxicillin-pot clavulanate]      Other reaction(s): Mental Status Change    Baclofen (bulk) Nausea And Vomiting    Ciprofloxacin (bulk)     Decongest tabs      Other reaction(s): increased heart rate    Erythromycin Other (See Comments)    Flecainide Hives     And SOB. No reaction to Lidocaine     Fluoxetine      Other reaction(s): heart palpitations  Other reaction(s): anxiety    Lisinopril Other (See Comments)     cough    Losartan Other (See Comments)     Hypotension with lightheadedness    Morphine Other (See Comments)     confusion    Tramadol Other (See Comments)     SOB, low BP     Venlafaxine analogues      Changes in BP and increases heart rate       Afrin [oxymetazoline] Palpitations    Caffeine Palpitations    Tizanidine Anxiety     dizziness       Current Facility-Administered Medications on File Prior to Encounter   Medication    bupivacaine (PF) 0.25% (2.5 mg/ml) injection    lidocaine (PF) 10 mg/ml (1%) injection    lidocaine (PF) 20 mg/ml (2%) injection    methylPREDNISolone acetate injection     Current Outpatient Medications on File Prior to Encounter   Medication Sig    apixaban (ELIQUIS) 5 mg Tab Take 1 tablet (5 mg total) by mouth 2 (two) times daily.    gabapentin (NEURONTIN) 300 MG capsule TAKE ONE CAPSULE BY MOUTH ONCE EVERY EVENING    lorazepam (ATIVAN) 1 MG tablet TAKE 1 TABLET BY MOUTH DAILY (Patient taking differently: TAKE 1 TABLET BY MOUTH DAILY PRN)    metoprolol tartrate (LOPRESSOR) 50 MG tablet TAKE 1 TABLET (50 MG TOTAL) BY MOUTH 2 (TWO) TIMES DAILY.    rosuvastatin (CRESTOR) 40 MG Tab TAKE 1 TABLET (40 MG TOTAL) BY MOUTH EVERY EVENING.    VIIBRYD 40 mg Tab tablet Take 40 mg by mouth once daily.    ammonium lactate (LAC-HYDRIN) 12 % lotion     ATROPINE SULFATE, PF, OPHT Apply to eye.    dapsone 25 MG Tab TAKE 2 TABLETS BY MOUTH ONCE DAILY RECHECK CBC IN 1MONTH    dorzolamide-timolol (COSOPT) 2-0.5 % ophthalmic solution Place 1 drop into both eyes 2 (two) times daily. Twice a day    fluticasone (FLONASE) 50 mcg/actuation nasal spray 1 spray (50 mcg total) by Each Nare route once daily.    fluticasone-vilanterol (BREO ELLIPTA) 200-25 mcg/dose DsDv diskus inhaler Inhale 1 puff into the lungs once daily.    FLUZONE HIGH-DOSE 2018-19, PF, 180 mcg/0.5 mL vaccine TO BE ADMINISTERED BY PHARMACIST FOR IMMUNIZATION    homatropine (ISOPTO HOMATROPINE) 5 % ophthalmic solution INSTILL 1 DROP INTO RIGHT EYE ONCE A DAY    levalbuterol (XOPENEX HFA) 45 mcg/actuation inhaler Inhale 1-2 puffs into the lungs every 4 (four) hours as needed for Wheezing. This is a  rescue inhaler medication and should be used as needed    metroNIDAZOLE 0.75 % Lotn APPLY A PEA SIZED AMOUNT TO FACE DAILY    pantoprazole (PROTONIX) 40 MG tablet Take 1 tablet (40 mg total) by mouth once daily. (Patient taking differently: Take 40 mg by mouth once daily. )    predniSONE (DELTASONE) 5 MG tablet Take 2 tablets (10 mg total) by mouth 2 (two) times daily. may repeat for shortness of breath.    spironolactone (ALDACTONE) 25 MG tablet Take 1 tablet (25 mg total) by mouth once daily.     Family History     Problem Relation (Age of Onset)    Alzheimer's disease Maternal Uncle    Arthritis Mother    Asthma Mother    Cancer Maternal Grandfather, Paternal Grandmother    Depression Son    Emphysema Maternal Grandfather    Heart disease Father    Kidney disease Maternal Grandfather    Pneumonia Mother, Paternal Grandfather    Rheum arthritis Mother, Maternal Grandmother    Ulcers Father        Tobacco Use    Smoking status: Never Smoker    Smokeless tobacco: Never Used   Substance and Sexual Activity    Alcohol use: No    Drug use: No    Sexual activity: Yes     Partners: Male     Review of Systems   Constitutional: Positive for activity change, appetite change and fatigue. Negative for diaphoresis and fever.   HENT: Positive for postnasal drip. Negative for congestion, facial swelling, sneezing and sore throat.    Eyes: Negative for photophobia and redness.   Respiratory: Negative for cough and shortness of breath.    Cardiovascular: Negative for chest pain and leg swelling.   Gastrointestinal: Positive for constipation, nausea and vomiting. Negative for abdominal distention, abdominal pain and diarrhea.   Endocrine: Negative for polyphagia and polyuria.   Genitourinary: Negative for difficulty urinating, dysuria and frequency.   Musculoskeletal: Positive for arthralgias. Negative for gait problem and myalgias.        Right chest wall pain   Skin: Negative for rash.   Neurological: Negative for  dizziness, speech difficulty and numbness.   Psychiatric/Behavioral: Negative for confusion.     Objective:     Vital Signs (Most Recent):  Temp: 97.5 °F (36.4 °C) (10/14/19 1011)  Pulse: 81 (10/14/19 1011)  Resp: 18 (10/14/19 1011)  BP: (!) 143/68 (10/14/19 1011)  SpO2: (!) 93 % (10/14/19 1011) Vital Signs (24h Range):  Temp:  [97.5 °F (36.4 °C)-98.2 °F (36.8 °C)] 97.5 °F (36.4 °C)  Pulse:  [] 81  Resp:  [16-20] 18  SpO2:  [90 %-100 %] 93 %  BP: ()/() 143/68     Weight: 61.6 kg (135 lb 12.9 oz)  Body mass index is 21.27 kg/m².    Physical Exam        Significant Labs:   BMP:   Recent Labs   Lab 10/14/19  0012   *      K 3.5      CO2 22*   BUN 12   CREATININE 0.9   CALCIUM 9.3     CBC:   Recent Labs   Lab 10/14/19  0012   WBC 15.35*   HGB 11.4*   HCT 36.1*          Significant Imaging: I have reviewed and interpreted all pertinent imaging results/findings within the past 24 hours.

## 2019-10-14 NOTE — ASSESSMENT & PLAN NOTE
Patient currently in atrial fibrillation.  Will check EKG.  Resume home beta-blocker and oral anticoagulant.

## 2019-10-14 NOTE — ANESTHESIA PREPROCEDURE EVALUATION
10/14/2019  Ayla Dela Cruz is a 74 y.o., female.    Anesthesia Evaluation    I have reviewed the Patient Summary Reports.    I have reviewed the Nursing Notes.   I have reviewed the Medications.     Review of Systems  Anesthesia Hx:  No problems with previous Anesthesia    Social:  Non-Smoker    Cardiovascular:   Hypertension, well controlled Dysrhythmias atrial fibrillation CHF AVILA    Pulmonary:   Pneumonia Asthma mild Shortness of breath    Renal/:  Renal/ Normal     Hepatic/GI:   GERD    Musculoskeletal:   Arthritis     Neurological:   TIA, CVA, no residual symptoms Neuromuscular Disease,    Endocrine:  Endocrine Normal    Psych:   Psychiatric History anxiety depression          Physical Exam  General:  Well nourished    Airway/Jaw/Neck:  Airway Findings: Mouth Opening: Normal Tongue: Normal  General Airway Assessment: Adult  Oropharynx Findings:  Mallampati: II  Jaw/Neck Findings:  Neck ROM: Normal ROM     Eyes/Ears/Nose:  Eyes/Ears/Nose Findings:    Dental:  Dental Findings:   Chest/Lungs:  Chest/Lungs Findings: Normal Respiratory Rate     Heart/Vascular:  Heart Findings: Rate: Normal  Rhythm: Regular Rhythm        Mental Status:  Mental Status Findings:  Cooperative, Alert and Oriented         Anesthesia Plan  Type of Anesthesia, risks & benefits discussed:  Anesthesia Type:  general  Patient's Preference:   Intra-op Monitoring Plan: standard ASA monitors  Intra-op Monitoring Plan Comments:   Post Op Pain Control Plan: multimodal analgesia  Post Op Pain Control Plan Comments:   Induction:   IV  Beta Blocker:  Patient is on a Beta-Blocker and has received one dose within the past 24 hours (No further documentation required).       Informed Consent: Patient understands risks and agrees with Anesthesia plan.  Questions answered. Anesthesia consent signed with patient.  ASA Score: 3     Day of  Surgery Review of History & Physical:  There are no significant changes.   H&P completed by Anesthesiologist.       Ready For Surgery From Anesthesia Perspective.

## 2019-10-14 NOTE — TRANSFER OF CARE
"Anesthesia Transfer of Care Note    Patient: Ayla Dela Cruz    Procedure(s) Performed: Procedure(s) (LRB):  EGD (ESOPHAGOGASTRODUODENOSCOPY) (N/A)    Patient location: PACU    Anesthesia Type: general    Transport from OR: Transported from OR on 2-3 L/min O2 by NC with adequate spontaneous ventilation    Post pain: adequate analgesia    Post assessment: no apparent anesthetic complications    Post vital signs: stable    Level of consciousness: awake    Nausea/Vomiting: no nausea/vomiting    Complications: none    Transfer of care protocol was followed      Last vitals:   Visit Vitals  BP (!) 91/49   Pulse 62   Temp 36.7 °C (98.1 °F) (Skin)   Resp 16   Ht 5' 7" (1.702 m)   Wt 60.3 kg (133 lb)   SpO2 98%   Breastfeeding? No   BMI 20.83 kg/m²     "

## 2019-10-14 NOTE — ASSESSMENT & PLAN NOTE
Monitor liver function tests.  Initiated on gentle IV hydration.  Possibly related to mild dehydration from NPO status

## 2019-10-14 NOTE — ASSESSMENT & PLAN NOTE
Controlled with diet and fluid restriction.  No evidence of acute heart failure.  Gentle IV hydration

## 2019-10-14 NOTE — NURSING
Pt states she will take bactrim at home as prescribed and inform MD if she has any issues with it.

## 2019-10-14 NOTE — PLAN OF CARE
CM met with pt bedside to complete discharge assessment. Pt verified information as correct on facesheet. Pt denies POA/LW. Pt reports living at listed address with her spouse, Jan. PCP is Dr. Olivo. Pharm is [facility] on [address]. Pt denies any hh/hd/dme. Pt reports being independent with all ADLs and is able to drive herself to appts. DC plan is home. CM is following to assist in any DC needs.        10/14/19 1045   Discharge Assessment   Assessment Type Discharge Planning Assessment   Confirmed/corrected address and phone number on facesheet? Yes   Assessment information obtained from? Patient   Communicated expected length of stay with patient/caregiver yes   Prior to hospitilization cognitive status: Alert/Oriented   Prior to hospitalization functional status: Independent   Current cognitive status: Alert/Oriented   Current Functional Status: Independent   Lives With spouse   Able to Return to Prior Arrangements yes   Is patient able to care for self after discharge? Yes   Patient's perception of discharge disposition home or selfcare   Readmission Within the Last 30 Days no previous admission in last 30 days   Patient currently being followed by outpatient case management? No   Patient currently receives any other outside agency services? No   Equipment Currently Used at Home none   Do you have any problems affording any of your prescribed medications? No   Is the patient taking medications as prescribed? yes   Does the patient have transportation home? Yes   Transportation Anticipated family or friend will provide   Does the patient receive services at the [facility]madin Clinic? No   Discharge Plan A Home   DME Needed Upon Discharge  none   Patient/Family in Agreement with Plan yes

## 2019-10-14 NOTE — CONSULTS
DATE OF CONSULTATION:  10/14/2019    ATTENDING PHYSICIAN:  Dr. Wilson.    CONSULTANT:  Genet Carter M.D.    HISTORY OF PRESENT ILLNESS:  This 74-year-old female is currently hospitalized   with an approximately 1 to 2-day history of right flank pain.  She denies any   other associated symptoms, no fever, no chills.  She did undergo a CT scan,   which did reveal findings of significant abnormality to the right kidney in   terms of abnormal right nephrogram and consideration of possible acute infarct   or pyelonephritis with hypodense areas at various parts of the right kidney.    The left kidney was otherwise unremarkable.  On questioning, the patient denies   any prior  history.  No history of urinary tract infections, no voiding   complaints.    PAST MEDICAL HISTORY:  Includes that of congestive heart failure, atrial   fibrillation, COPD, skin cancer.    PAST SURGICAL HISTORY:  Includes cholecystectomy, cataract surgery, pyloric   stenosis as a child that was repaired, tonsillectomy.    ALLERGIES:  INCLUDE ATORVASTATIN, AUGMENTIN, BACLOFEN, CIPRO, ERYTHROMYCIN,   FLECAINIDE, FLUOXETINE, MORPHINE, TRAMADOL,  OXYMETAZOLINE, TIZANIDINE,   LISINOPRIL AND LOSARTAN.    CURRENT MEDICATIONS:  She is on include apixaban, dapsone, gabapentin,   ketorolac, metoprolol, rosuvastatin and spironolactone.    FAMILY HISTORY:  Father had heart condition.  Mother has asthma.  One brother   living and well.    SOCIAL HISTORY:   with two daughters and one son in good health.    Tobacco, none.  Alcohol, occasional social intake.    REVIEW OF SYMPTOMS:  GENERAL:  No weight change.  Good appetite.  HEAD AND NECK:  No headaches.  No visual problems.  CARDIORESPIRATORY:  No chest pain, shortness of breath.  No cough.  GASTROINTESTINAL:  No bowel problems, right-sided pain as described above.    PHYSICAL EXAMINATION:  GENERAL:  The patient is fully alert, oriented, awake, does not appear to be in   any acute distress.  HEAD  NECK AND THROAT:  Clear.  CHEST:  Clear.  HEART:  Shows some irregularities consistent with atrial fibrillation.  ABDOMEN:  Soft, but there are some areas of tenderness over the right flank.  No   obvious masses palpable.  No guarding, no rebound.  PELVIS:  Reveals normal female introitus.  No mass, no blood.    LABORATORY WORK:  Reveals urinalysis to be essentially unremarkable, although   there was a trace of blood.  WBC 15.35, hemoglobin 11.4, hematocrit 36.1,   platelets 282, BUN of 12, creatinine 0.9.  Electrolytes within satisfactory   range.  Bilirubin 1.5, glucose 119.    FINAL IMPRESSION:  Right flank pain with abnormal imaging of the right kidney   consistent with possible renal atrophy or possible pyelonephritis.    RECOMMENDATIONS:  Continue with current management with IV fluids and also I   suggest we place her on antibiotic management.  Taking into consideration   multiple allergies, we are somewhat limited in terms of antibiotic coverage, but   the possibility of sulfa or Bactrim to be considered.  She will also require   further followup imaging but also can continued to be managed   as an outpatient from the urologic aspect. She was instructed to make a   followup appointment with us in the office    Thank you for this consult.      MD/IN  dd: 10/14/2019 17:00:30 (CDT)  td: 10/14/2019 18:29:37 (CDT)  Doc ID   #[id number]  Job ID #[id number]    CC:

## 2019-10-14 NOTE — ED NOTES
Pt dry heaving while I was attempting to start IV. Pt complains of pain along right side just below ribs that she feels may be related to the vomiting in that she thinks maybe she pulled a muscle.

## 2019-10-14 NOTE — ANESTHESIA POSTPROCEDURE EVALUATION
Anesthesia Post Evaluation    Patient: Ayla Dela Cruz    Procedure(s) Performed: Procedure(s) (LRB):  EGD (ESOPHAGOGASTRODUODENOSCOPY) (N/A)    Final Anesthesia Type: general  Patient location during evaluation: PACU  Patient participation: Yes- Able to Participate  Level of consciousness: awake and alert and oriented  Post-procedure vital signs: reviewed and stable  Pain management: adequate  Airway patency: patent  PONV status at discharge: No PONV  Anesthetic complications: no      Cardiovascular status: blood pressure returned to baseline and stable  Respiratory status: unassisted and spontaneous ventilation  Hydration status: euvolemic  Follow-up not needed.          Vitals Value Taken Time   /72 10/14/2019  9:20 AM   Temp 36.8 °C (98.2 °F) 10/14/2019  8:25 AM   Pulse 78 10/14/2019  9:20 AM   Resp 16 10/14/2019  9:20 AM   SpO2 92 % 10/14/2019  9:20 AM         Event Time     Out of Recovery 10:03:59          Pain/Chalino Score: Pain Rating Prior to Med Admin: 8 (10/14/2019 12:47 AM)  Chalino Score: 10 (10/14/2019  9:20 AM)

## 2019-10-14 NOTE — ED PROVIDER NOTES
Encounter Date: 10/13/2019    SCRIBE #1 NOTE: I, Joana Carlos, am scribing for, and in the presence of, Nils Guzman MD.       History     Chief Complaint   Patient presents with    Nausea     started yesterday and got worse today.  sore      Time seen by provider: 11:54 PM on 10/13/2019    Ayla Dela Cruz is a 74 y.o. female with history of colonoscopy 3 days ago who presents to the ED with an onset of right sided abdominal pain and nausea tonight. Patient has no history of kidney stones or bowel obstructions. She endorses that she has not been having many full bowel movement since her colonscopy but endorses reduced oral intake.  When questioned about potential causes for her nausea and vomiting she reports this is often a sign of worsening depression and she believe she may have strained a muscle in the right side of her abdomen while vomiting tonight.  She denies  pain with urination, or any other symptoms at this time. Pertinent PMHx includes A-fib, HTN, GERD, DVT, arthritis, pneumonia, stroke, TIA, and CHF. Additional pertinent PSHx includes 2 heart ablations and cholecystectomy. Known drug allergies include atorvastatin, Augmentin, baclofen, erythromycin, losartan, lisinopril, morphine, tramadol, fluoxetine, flecainide, and ciprofloxacin..      The history is provided by the patient.     Review of patient's allergies indicates:   Allergen Reactions    Atorvastatin      Other reaction(s): Joint pain    Augmentin [amoxicillin-pot clavulanate]      Other reaction(s): Mental Status Change    Baclofen (bulk) Nausea And Vomiting    Ciprofloxacin (bulk)     Decongest tabs      Other reaction(s): increased heart rate    Erythromycin Other (See Comments)    Flecainide Hives     And SOB. No reaction to Lidocaine     Fluoxetine      Other reaction(s): heart palpitations  Other reaction(s): anxiety    Lisinopril Other (See Comments)     cough    Losartan Other (See Comments)     Hypotension with  lightheadedness    Morphine Other (See Comments)     confusion    Tramadol Other (See Comments)     SOB, low BP    Venlafaxine analogues      Changes in BP and increases heart rate       Afrin [oxymetazoline] Palpitations    Caffeine Palpitations    Tizanidine Anxiety     dizziness     Past Medical History:   Diagnosis Date    Anticoagulant long-term use     Anxiety     Arthritis     Atrial fibrillation     Cancer     skin    CHF (congestive heart failure)     Depression     DVT (deep venous thrombosis)     GERD (gastroesophageal reflux disease)     Glaucoma (increased eye pressure)     Hyperlipidemia     diet controlled    Hypertension     Interstitial lung disease     Mild persistent asthma without complication 11/12/2018    Pneumonia 1/31/2014    Stroke 6-3-14    Stroke     TIA (transient ischemic attack)     TIA (transient ischemic attack)      Past Surgical History:   Procedure Laterality Date    2 heart ablations      3 in total    bilateral cataracts      CHOLECYSTECTOMY      COLONOSCOPY N/A 1/19/2017    Procedure: COLONOSCOPY and EGD;  Surgeon: Jonathan Schultz MD;  Location: [facility] ENDO;  Service: Endoscopy;  Laterality: N/A;    COLONOSCOPY N/A 10/10/2019    Procedure: COLONOSCOPY;  Surgeon: Alex Christian MD;  Location: [facility] ENDO;  Service: Endoscopy;  Laterality: N/A;    INJECTION OF ANESTHETIC AGENT AROUND MEDIAL BRANCH NERVES INNERVATING CERVICAL FACET JOINT Right 6/7/2018    Procedure: BLOCK, NERVE, FACET JOINT, MEDIAL BRANCH, CERVICAL;  Surgeon: Galo Clancy MD;  Location: [facility] OR;  Service: Pain Management;  Laterality: Right;  C4, 5, 6    OPEN REDUCTION AND INTERNAL FIXATION (ORIF) OF INJURY OF ANKLE Right 11/1/2018    Procedure: ORIF, ANKLE;  Surgeon: Wilfredo Aguero MD;  Location: [facility] OR;  Service: Orthopedics;  Laterality: Right;    pyloristenosis      RADIOFREQUENCY ABLATION OF LUMBAR MEDIAL BRANCH NERVE AT SINGLE LEVEL Bilateral 9/21/2018    Procedure:  RADIOFREQUENCY ABLATION, NERVE, SPINAL, LUMBAR, MEDIAL BRANCH, 1 LEVEL;  Surgeon: Galo Clancy MD;  Location: [facility] OR;  Service: Pain Management;  Laterality: Bilateral;  L3, 4, 5    RADIOFREQUENCY ABLATION OF LUMBAR MEDIAL BRANCH NERVE AT SINGLE LEVEL Bilateral 2/19/2019    Procedure: Radiofrequency Ablation, Nerve, Spinal, Lumbar, Medial Branch, 1 Level;  Surgeon: Galo Clancy MD;  Location: [facility] OR;  Service: Pain Management;  Laterality: Bilateral;  L3, 4, 5     RADIOFREQUENCY THERMAL COAGULATION OF MEDIAL BRANCH OF POSTERIOR RAMUS OF CERVICAL SPINAL NERVE Right 7/3/2018    Procedure: RADIOFREQUENCY THERMAL COAGULATION, NERVE, SPINAL, CERVICAL, MEDIAL BRANCH OF POSTERIOR RAMUS;  Surgeon: Galo Clancy MD;  Location: [facility] OR;  Service: Pain Management;  Laterality: Right;  C4,5,6 - Burned at 80 degrees C. for 75 seconds each site    RADIOFREQUENCY THERMAL COAGULATION OF MEDIAL BRANCH OF POSTERIOR RAMUS OF CERVICAL SPINAL NERVE Right 7/23/2019    Procedure: RADIOFREQUENCY THERMAL COAGULATION, NERVE, SPINAL, CERVICAL, POSTERIOR RAMUS, MEDIAL BRANCH;  Surgeon: Galo Clancy MD;  Location: [facility] OR;  Service: Pain Management;  Laterality: Right;  C4,5,6    RADIOFREQUENCY THERMOCOAGULATION Bilateral 9/10/2019    Procedure: RADIOFREQUENCY THERMAL COAGULATION LUMBAR;  Surgeon: Galo Clancy MD;  Location: [facility] OR;  Service: Pain Management;  Laterality: Bilateral;  L3,4,5 - Burned at 80 degrees C. for 60 seconds x 2 each site    skin cancer removal       TONSILLECTOMY       Family History   Problem Relation Age of Onset    Heart disease Father     Ulcers Father     Arthritis Mother     Asthma Mother     Rheum arthritis Mother     Pneumonia Mother     Depression Son     Alzheimer's disease Maternal Uncle     Rheum arthritis Maternal Grandmother     Emphysema Maternal Grandfather     Cancer Maternal Grandfather         kidney    Kidney disease Maternal Grandfather     Cancer Paternal Grandmother         lung=  smoker    Pneumonia Paternal Grandfather     Breast cancer Neg Hx     Ovarian cancer Neg Hx      Social History     Tobacco Use    Smoking status: Never Smoker    Smokeless tobacco: Never Used   Substance Use Topics    Alcohol use: No    Drug use: No     Review of Systems   Constitutional: Negative for fever.   HENT: Negative for congestion.    Eyes: Negative for visual disturbance.   Respiratory: Negative for wheezing.    Cardiovascular: Negative for chest pain.   Gastrointestinal: Positive for abdominal pain, constipation and nausea. Negative for vomiting.   Genitourinary: Negative for difficulty urinating and dysuria.   Musculoskeletal: Negative for joint swelling.   Skin: Negative for rash.   Neurological: Negative for syncope.   Hematological: Bruises/bleeds easily.   Psychiatric/Behavioral: Negative for confusion.       Physical Exam     Initial Vitals [10/13/19 2333]   BP Pulse Resp Temp SpO2   (!) 208/93 100 20 97.8 °F (36.6 °C) 96 %      MAP       --         Physical Exam    Constitutional: She appears well-nourished.   HENT:   Head: Normocephalic and atraumatic.   Eyes: Conjunctivae and EOM are normal.   Neck: Normal range of motion. Neck supple. No thyroid mass present.   Cardiovascular: Normal rate, regular rhythm and normal heart sounds. Exam reveals no gallop and no friction rub.    No murmur heard.  Pulmonary/Chest: Breath sounds normal. She has no wheezes. She has no rhonchi. She has no rales.   Abdominal: Soft. Normal appearance and bowel sounds are normal. There is tenderness.   Tenderness over the right flank extending to the right lower, no overlying skin change, abdomen is soft, normal bowel sounds   Neurological: She is alert and oriented to person, place, and time. She has normal strength. No cranial nerve deficit or sensory deficit.   Skin: Skin is warm and dry. No rash noted. No erythema.   Psychiatric: She has a normal mood and affect. Her speech is normal. Cognition and memory are  normal.         ED Course   Procedures  Labs Reviewed - No data to display  EKG Readings: (Independently Interpreted)   Initial Reading: No STEMI.   Marked arrythmia with sinus rhythm at rate of 70 bpm with left axis deviation. Incomplete LBBB. Non-specific T-wave abnormality.       Imaging Results    None          Medical Decision Making:   History:   Old Medical Records: I decided to obtain old medical records.  Independently Interpreted Test(s):   I have ordered and independently interpreted EKG Reading(s) - see prior notes  Clinical Tests:   Lab Tests: Ordered and Reviewed  Medical Tests: Ordered and Reviewed  ED Management:  This patient was emergently received and assessed upon arrival.  She has a nonsurgical abdominal examination. Initial vital signs significant for hypertension and the patient reports she believes this is secondary to white coat hypertension and pain. The patient thinks her nausea and vomiting is secondary to feeling anxiety/depression which is common for her but she had resolution of symptoms here with IV Zofran, fluids.  Screening labs and CT scan of the abdomen and pelvis were obtained to rule out evidence of occult intra-abdominal life-threatening pathology including acute appendicitis, iatrogenic colonic perforation.  Workup is significant for leukocytosis of 15,000 but the patient reports she has taken prednisone within the past 48 hr which she takes as needed for pulmonary symptoms. Past lab results frequently reflect a similar leukocytosis.  CT scan of the abdomen and pelvis is negative for constipation or additional surgical pathology including appendicitis.  I suspect the patient is having musculoskeletal pain secondary to heavy retching at home and she had marked improvement here with a Lidoderm patch.  She will be educated about supportive care for these symptoms in the outpatient setting and is asked to follow up with her primary care doctor as soon as possible regarding  expected improvement.  She is asked to return to the ER immediately for any new, concerning, or worsening symptoms. Patient and  agreeable with this plan for close follow-up and she is discharged in stable condition.            Scribe Attestation:   Scribe #1: I performed the above scribed service and the documentation accurately describes the services I performed. I attest to the accuracy of the note.    I, Dr. Nils Guzman, personally performed the services described in this documentation. All medical record entries made by the scribe were at my direction and in my presence.  I have reviewed the chart and agree that the record reflects my personal performance and is accurate and complete. Nils Guzman MD.  6:58 AM 10/14/2019             Clinical Impression:       ICD-10-CM ICD-9-CM   1. Right sided abdominal pain R10.9 789.09   2. Tachycardia R00.0 785.0   3. Strain of abdominal wall, initial encounter S39.011A 848.8   4. Nausea and vomiting, intractability of vomiting not specified, unspecified vomiting type R11.2 787.01         Disposition:   Disposition: Discharged  Condition: Stable                        Nils Guzman MD  10/14/19 0659

## 2019-10-14 NOTE — [employer] PATIENT INSTRUCTIONS
Discharge Summary/Instructions after an Endoscopic Procedure  Patient Name: Ayla Dela Cruz  Patient MRN: [id number]  Patient YOB: 1945  Monday, October 14, 2019  Alex Schwarz MD  RESTRICTIONS:  During your procedure today, you received medications for sedation.  These   medications may affect your judgment, balance and coordination.  Therefore,   for 24 hours, you have the following restrictions:   - DO NOT drive a car, operate machinery, make legal/financial decisions,   sign important papers or drink alcohol.    ACTIVITY:  Today: no heavy lifting, straining or running due to procedural   sedation/anesthesia.  The following day: return to full activity including work.  DIET:  Eat and drink normally unless instructed otherwise.     TREATMENT FOR COMMON SIDE EFFECTS:  - Mild abdominal pain, nausea, belching, bloating or excessive gas:  rest,   eat lightly and use a heating pad.  - Sore Throat: treat with throat lozenges and/or gargle with warm salt   water.  - Because air was used during the procedure, expelling large amounts of air   from your rectum or belching is normal.  - If a bowel prep was taken, you may not have a bowel movement for 1-3 days.    This is normal.  SYMPTOMS TO WATCH FOR AND REPORT TO YOUR PHYSICIAN:  1. Abdominal pain or bloating, other than gas cramps.  2. Chest pain.  3. Back pain.  4. Signs of infection such as: chills or fever occurring within 24 hours   after the procedure.  5. Rectal bleeding, which would show as bright red, maroon, or black stools.   (A tablespoon of blood from the rectum is not serious, especially if   hemorrhoids are present.)  6. Vomiting.  7. Weakness or dizziness.  GO DIRECTLY TO THE NEAREST EMERGENCY ROOM IF YOU HAVE ANY OF THE FOLLOWING:      Difficulty breathing              Chills and/or fever over 101 F   Persistent vomiting and/or vomiting blood   Severe abdominal pain   Severe chest pain   Black, tarry stools   Bleeding- more than one  tablespoon   Any other symptom or condition that you feel may need urgent attention  Your doctor recommends these additional instructions:  If any biopsies were taken, your doctors clinic will contact you in 1 to 2   weeks with any results.  - Patient has a contact number available for emergencies.  The signs and   symptoms of potential delayed complications were discussed with the   patient.  Return to normal activities tomorrow.  Written discharge   instructions were provided to the patient.   - Resume previous diet.   - Continue present medications.   - No aspirin, ibuprofen, naproxen, or other non-steroidal anti-inflammatory   drugs.   - Await pathology results.   - Discharge patient to home (ambulatory).   - Follow an antireflux regimen.   - Use sucralfate tablets 1 gram PO QID for 10 days.   - Return to my office after studies are complete.  For questions, problems or results please call your physician - Alex Schwarz MD at Work:  [phone].  [facility], EMERGENCY ROOM PHONE NUMBER: [phone]  IF A COMPLICATION OR EMERGENCY SITUATION ARISES AND YOU ARE UNABLE TO REACH   YOUR PHYSICIAN - GO DIRECTLY TO THE EMERGENCY ROOM.  Alex Schwarz MD  10/14/2019 8:14:52 AM  This report has been verified and signed electronically.  [employer]

## 2019-10-14 NOTE — PROVIDER PROGRESS NOTES - EMERGENCY DEPT.
Encounter Date: 10/13/2019    ED Physician Progress Notes        Physician Note:   Spoke with Dr. Alexandra of Radiology regarding abnormal CT scan concerning for renal infarction.  I did speak with Dr. Christian about the CT scan after the patient's endoscopy and he will follow up the CT scan and disposition/consult as needed.

## 2019-10-14 NOTE — DISCHARGE INSTRUCTIONS
"Discharge Instructions: After Your Surgery/Procedure  Youve just had surgery. During surgery you were given medicine called anesthesia to keep you relaxed and free of pain. After surgery you may have some pain or nausea. This is common. Here are some tips for feeling better and getting well after surgery.     Stay on schedule with your medication.   Going home  Your doctor or nurse will show you how to take care of yourself when you go home. He or she will also answer your questions. Have an adult family member or friend drive you home.      For your safety we recommend these precaution for the first 24 hours after your procedure:  · Do not drive or use heavy equipment.  · Do not make important decisions or sign legal papers.  · Do not drink alcohol.  · Have someone stay with you, if needed. He or she can watch for problems and help keep you safe.  · Your concentration, balance, coordination, and judgement may be impaired for many hours after anesthesia.  Use caution when ambulating or standing up.     · You may feel weak and "washed out" after anesthesia and surgery.      Subtle residual effects of general anesthesia or sedation with regional / local anesthesia can last more than 24 hours.  Rest for the remainder of the day or longer if your Doctor/Surgeon has advised you to do so.  Although you may feel normal within the first 24 hours, your reflexes and mental ability may be impaired without you realizing it.  You may feel dizzy, lightheaded or sleepy for 24 hours or longer.      Be sure to go to all follow-up visits with your doctor. And rest after your surgery for as long as your doctor tells you to.  Coping with pain  If you have pain after surgery, pain medicine will help you feel better. Take it as told, before pain becomes severe. Also, ask your doctor or pharmacist about other ways to control pain. This might be with heat, ice, or relaxation. And follow any other instructions your surgeon or nurse gives " you.  Tips for taking pain medicine  To get the best relief possible, remember these points:  · Pain medicines can upset your stomach. Taking them with a little food may help.  · Most pain relievers taken by mouth need at least 20 to 30 minutes to start to work.  · Taking medicine on a schedule can help you remember to take it. Try to time your medicine so that you can take it before starting an activity. This might be before you get dressed, go for a walk, or sit down for dinner.  · Constipation is a common side effect of pain medicines. Call your doctor before taking any medicines such as laxatives or stool softeners to help ease constipation. Also ask if you should skip any foods. Drinking lots of fluids and eating foods such as fruits and vegetables that are high in fiber can also help. Remember, do not take laxatives unless your surgeon has prescribed them.  · Drinking alcohol and taking pain medicine can cause dizziness and slow your breathing. It can even be deadly. Do not drink alcohol while taking pain medicine.  · Pain medicine can make you react more slowly to things. Do not drive or run machinery while taking pain medicine.  Your health care provider may tell you to take acetaminophen to help ease your pain. Ask him or her how much you are supposed to take each day. Acetaminophen or other pain relievers may interact with your prescription medicines or other over-the-counter (OTC) drugs. Some prescription medicines have acetaminophen and other ingredients. Using both prescription and OTC acetaminophen for pain can cause you to overdose. Read the labels on your OTC medicines with care. This will help you to clearly know the list of ingredients, how much to take, and any warnings. It may also help you not take too much acetaminophen. If you have questions or do not understand the information, ask your pharmacist or health care provider to explain it to you before you take the OTC medicine.  Managing  nausea  Some people have an upset stomach after surgery. This is often because of anesthesia, pain, or pain medicine, or the stress of surgery. These tips will help you handle nausea and eat healthy foods as you get better. If you were on a special food plan before surgery, ask your doctor if you should follow it while you get better. These tips may help:  · Do not push yourself to eat. Your body will tell you when to eat and how much.  · Start off with clear liquids and soup. They are easier to digest.  · Next try semi-solid foods, such as mashed potatoes, applesauce, and gelatin, as you feel ready.  · Slowly move to solid foods. Dont eat fatty, rich, or spicy foods at first.  · Do not force yourself to have 3 large meals a day. Instead eat smaller amounts more often.  · Take pain medicines with a small amount of solid food, such as crackers or toast, to avoid nausea.     Call your surgeon if  · You still have pain an hour after taking medicine. The medicine may not be strong enough.  · You feel too sleepy, dizzy, or groggy. The medicine may be too strong.  · You have side effects like nausea, vomiting, or skin changes, such as rash, itching, or hives.       If you have obstructive sleep apnea  You were given anesthesia medicine during surgery to keep you comfortable and free of pain. After surgery, you may have more apnea spells because of this medicine and other medicines you were given. The spells may last longer than usual.   At home:  · Keep using the continuous positive airway pressure (CPAP) device when you sleep. Unless your health care provider tells you not to, use it when you sleep, day or night. CPAP is a common device used to treat obstructive sleep apnea.  · Talk with your provider before taking any pain medicine, muscle relaxants, or sedatives. Your provider will tell you about the possible dangers of taking these medicines.  © 1760-9262 [employer]. [address] [place] [address]  [place] [address]. All rights reserved. This information is not intended as a substitute for professional medical care. Always follow your healthcare professional's instructions.    Gastritis (Adult)    Gastritis is inflammation and irritation of the stomach lining. It can be present for a short time (acute) or be long lasting (chronic). Gastritis is often caused by infection with bacteria called H pylori. More than a third of people in the [place] have this bacteria in their bodies. In many cases, H pylori causes no problems or symptoms. In some people, though, the infection irritates the stomach lining and causes gastritis. Other causes of stomach irritation include drinking alcohol or taking pain-relieving medicines called NSAIDs (such as aspirin or ibuprofen).   Symptoms of gastritis can include:  · Abdominal pain or bloating  · Loss of appetite  · Nausea or vomiting  · Vomiting blood or having black stools  · Feeling more tired than usual  An inflamed and irritated stomach lining is more likely to develop a sore called an ulcer. To help prevent this, gastritis should be treated.  Home care  If needed, medicines may be prescribed. If you have H pylori infection, treating it will likely relieve your symptoms. Other changes can help reduce stomach irritation and help it heal.  · If you have been prescribed medicines for H pylori infection, take them as directed. Take all of the medicine until it is finished or your healthcare provider tells you to stop, even if you feel better.  · Your healthcare provider may recommend avoiding NSAIDs. If you take daily aspirin for your heart or other medical reasons, do not stop without talking to your healthcare provider first.  · Avoid drinking alcohol.  · Stop smoking. Smoking can irritate the stomach and delay healing. As much as possible, stay away from second hand smoke.  Follow-up care  Follow up with your healthcare provider, or as advised by our staff. Testing may be needed to check  for inflammation or an ulcer.  When to seek medical advice  Call your healthcare provider for any of the following:  · Stomach pain that gets worse or moves to the lower right abdomen (appendix area)  · Chest pain that appears or gets worse, or spreads to the back, neck, shoulder, or arm  · Frequent vomiting (cant keep down liquids)  · Blood in the stool or vomit (red or black in color)  · Feeling weak or dizzy  · Fever of 100.4ºF (38ºC) or higher, or as directed by your healthcare provider  Date Last Reviewed: 6/22/2015 © 2000-2017 [employer]. [address] [place] [address]. All rights reserved. This information is not intended as a substitute for professional medical care. Always follow your healthcare professional's instructions.        What Is a Hiatal Hernia?    Hiatal hernia is when the area where the stomach and esophagus meet bulges up through the diaphragm into the chest cavity. In some cases, part of the stomach may bulge above the diaphragm. Stomach acid may move up into the esophagus and cause symptoms. The symptoms are often blamed on gastroesophageal reflux disease (GERD). You may only know about the hernia when it shows up on an X-ray taken for other reasons.   What you may feel  The hiatus is a normal hole in the diaphragm. The esophagus passes through this hole and leads to the stomach. In some cases, part of the stomach may bulge above the diaphragm. This bulge is called a hernia. Stomach acid may move up into the esophagus and cause symptoms.  When you eat, the muscle at the hiatus relaxes to allow food to pass into the stomach. It tightens again to keep food and digestive acids in the stomach.  Many people with hiatal hernias have mild symptoms. You may notice the following GERD symptoms:  · Heartburn or other chest discomfort  · A feeling of chest fullness after a meal  · Frequent burping  · Acid taste in the mouth  · Trouble swallowing  Treating symptoms  If you have  been diagnosed with hiatal hernia, these suggestions may help improve symptoms:  · Lose excess weight. Extra weight puts pressure on the stomach and esophagus.  · Dont lie down after eating. Sit up for at least an hour after eating. Lying down after eating can increase symptoms.  · Avoid certain foods and drinks. These include fatty foods, chocolate, coffee, mint, and other foods that cause symptoms for you.  · Dont smoke or drink alcohol. These can worsen symptoms.  · Look at your medicines. Discuss your medicines with your healthcare provider. Many medicines can cause symptoms.  · Consider an antacid medicine. Ask your healthcare provider about over-the-counter and prescription medicines that may help.  · Ask about surgery, if needed. Surgery is a treatment choice for some people. Your healthcare provider can determine if surgery is an option for you.    Date Last Reviewed: 10/1/2016  © 6974-4737 [employer]. [address] [place] [address], [place] [address]. All rights reserved. This information is not intended as a substitute for professional medical care. Always follow your healthcare professional's instructions.

## 2019-10-14 NOTE — H&P
[facility] Hospital Medicine  History & Physical    Patient Name: Ayla Dela Cruz  MRN: [id number]  Admission Date: 10/14/2019  Attending Physician: Yesenia Seals NP  Primary Care Provider: Jan Olivo MD         Patient information was obtained from patient and ER records.     Subjective:     Principal Problem:Renal infarct    Chief Complaint:   Chief Complaint   Patient presents with    Chest Pain     Left chest wall        HPI: Ayla Dela Cruz is 74-year-old female with a past medical history of COPD, , hypertension, congestive heart failure who is status post routine upper GI this morning per [facility] which demonstrated mild gastritis.  She was evaluated in the emergency room department yesterday for evaluation of right chest wall pain. She was initiated on lidocaine patch and Tylenol.  She underwent CT of the abdomen which was read by [facility] this morning and demonstrated possible right renal infarct.  BMP upon admit demonstrated normal renal function.  She states she underwent a colonoscopy 3 days ago and since that procedure she has had right-sided abdominal pain.  She is so CAD this with constipation and tried using an enema which did relieve some of her pain. She states she has been dry heaving from the nausea.  Denies vomiting.  She states right chest wall pain is a 6/10 and sharp in nature and increases with movement.  She has been admitted for urology consultation for possible renal infarct.    Past Medical History:   Diagnosis Date    Anticoagulant long-term use     Anxiety     Arthritis     Atrial fibrillation     Cancer     skin    CHF (congestive heart failure)     Depression     DVT (deep venous thrombosis)     GERD (gastroesophageal reflux disease)     Glaucoma (increased eye pressure)     Hyperlipidemia     diet controlled    Hypertension     Interstitial lung disease     Mild persistent asthma without complication 11/12/2018     Pneumonia 1/31/2014    Stroke 6-3-14    Stroke     TIA (transient ischemic attack)     TIA (transient ischemic attack)        Past Surgical History:   Procedure Laterality Date    2 heart ablations      3 in total    bilateral cataracts      CHOLECYSTECTOMY      COLONOSCOPY N/A 1/19/2017    Procedure: COLONOSCOPY and EGD;  Surgeon: Jonathan Schultz MD;  Location: [facility] ENDO;  Service: Endoscopy;  Laterality: N/A;    COLONOSCOPY N/A 10/10/2019    Procedure: COLONOSCOPY;  Surgeon: Alex Christian MD;  Location: [facility] ENDO;  Service: Endoscopy;  Laterality: N/A;    INJECTION OF ANESTHETIC AGENT AROUND MEDIAL BRANCH NERVES INNERVATING CERVICAL FACET JOINT Right 6/7/2018    Procedure: BLOCK, NERVE, FACET JOINT, MEDIAL BRANCH, CERVICAL;  Surgeon: Galo Clancy MD;  Location: [facility] OR;  Service: Pain Management;  Laterality: Right;  C4, 5, 6    OPEN REDUCTION AND INTERNAL FIXATION (ORIF) OF INJURY OF ANKLE Right 11/1/2018    Procedure: ORIF, ANKLE;  Surgeon: Wilfredo Aguero MD;  Location: [facility] OR;  Service: Orthopedics;  Laterality: Right;    pyloristenosis      RADIOFREQUENCY ABLATION OF LUMBAR MEDIAL BRANCH NERVE AT SINGLE LEVEL Bilateral 9/21/2018    Procedure: RADIOFREQUENCY ABLATION, NERVE, SPINAL, LUMBAR, MEDIAL BRANCH, 1 LEVEL;  Surgeon: Galo Clancy MD;  Location: [facility] OR;  Service: Pain Management;  Laterality: Bilateral;  L3, 4, 5    RADIOFREQUENCY ABLATION OF LUMBAR MEDIAL BRANCH NERVE AT SINGLE LEVEL Bilateral 2/19/2019    Procedure: Radiofrequency Ablation, Nerve, Spinal, Lumbar, Medial Branch, 1 Level;  Surgeon: Galo Clancy MD;  Location: [facility] OR;  Service: Pain Management;  Laterality: Bilateral;  L3, 4, 5     RADIOFREQUENCY THERMAL COAGULATION OF MEDIAL BRANCH OF POSTERIOR RAMUS OF CERVICAL SPINAL NERVE Right 7/3/2018    Procedure: RADIOFREQUENCY THERMAL COAGULATION, NERVE, SPINAL, CERVICAL, MEDIAL BRANCH OF POSTERIOR RAMUS;  Surgeon: Galo Clancy MD;  Location: [facility] OR;  Service: Pain Management;   Laterality: Right;  C4,5,6 - Burned at 80 degrees C. for 75 seconds each site    RADIOFREQUENCY THERMAL COAGULATION OF MEDIAL BRANCH OF POSTERIOR RAMUS OF CERVICAL SPINAL NERVE Right 7/23/2019    Procedure: RADIOFREQUENCY THERMAL COAGULATION, NERVE, SPINAL, CERVICAL, POSTERIOR RAMUS, MEDIAL BRANCH;  Surgeon: Galo Clancy MD;  Location: [facility] OR;  Service: Pain Management;  Laterality: Right;  C4,5,6    RADIOFREQUENCY THERMOCOAGULATION Bilateral 9/10/2019    Procedure: RADIOFREQUENCY THERMAL COAGULATION LUMBAR;  Surgeon: Galo Clancy MD;  Location: [facility] OR;  Service: Pain Management;  Laterality: Bilateral;  L3,4,5 - Burned at 80 degrees C. for 60 seconds x 2 each site    skin cancer removal       TONSILLECTOMY         Review of patient's allergies indicates:   Allergen Reactions    Atorvastatin      Other reaction(s): Joint pain    Augmentin [amoxicillin-pot clavulanate]      Other reaction(s): Mental Status Change    Baclofen (bulk) Nausea And Vomiting    Ciprofloxacin (bulk)     Decongest tabs      Other reaction(s): increased heart rate    Erythromycin Other (See Comments)    Flecainide Hives     And SOB. No reaction to Lidocaine     Fluoxetine      Other reaction(s): heart palpitations  Other reaction(s): anxiety    Lisinopril Other (See Comments)     cough    Losartan Other (See Comments)     Hypotension with lightheadedness    Morphine Other (See Comments)     confusion    Tramadol Other (See Comments)     SOB, low BP    Venlafaxine analogues      Changes in BP and increases heart rate       Afrin [oxymetazoline] Palpitations    Caffeine Palpitations    Tizanidine Anxiety     dizziness       Current Facility-Administered Medications on File Prior to Encounter   Medication    bupivacaine (PF) 0.25% (2.5 mg/ml) injection    lidocaine (PF) 10 mg/ml (1%) injection    lidocaine (PF) 20 mg/ml (2%) injection    methylPREDNISolone acetate injection     Current Outpatient Medications on File Prior  to Encounter   Medication Sig    apixaban (ELIQUIS) 5 mg Tab Take 1 tablet (5 mg total) by mouth 2 (two) times daily.    gabapentin (NEURONTIN) 300 MG capsule TAKE ONE CAPSULE BY MOUTH ONCE EVERY EVENING    lorazepam (ATIVAN) 1 MG tablet TAKE 1 TABLET BY MOUTH DAILY (Patient taking differently: TAKE 1 TABLET BY MOUTH DAILY PRN)    metoprolol tartrate (LOPRESSOR) 50 MG tablet TAKE 1 TABLET (50 MG TOTAL) BY MOUTH 2 (TWO) TIMES DAILY.    rosuvastatin (CRESTOR) 40 MG Tab TAKE 1 TABLET (40 MG TOTAL) BY MOUTH EVERY EVENING.    VIIBRYD 40 mg Tab tablet Take 40 mg by mouth once daily.    ammonium lactate (LAC-HYDRIN) 12 % lotion     ATROPINE SULFATE, PF, OPHT Apply to eye.    dapsone 25 MG Tab TAKE 2 TABLETS BY MOUTH ONCE DAILY RECHECK CBC IN 1MONTH    dorzolamide-timolol (COSOPT) 2-0.5 % ophthalmic solution Place 1 drop into both eyes 2 (two) times daily. Twice a day    fluticasone (FLONASE) 50 mcg/actuation nasal spray 1 spray (50 mcg total) by Each Nare route once daily.    fluticasone-vilanterol (BREO ELLIPTA) 200-25 mcg/dose DsDv diskus inhaler Inhale 1 puff into the lungs once daily.    FLUZONE HIGH-DOSE 2018-19, PF, 180 mcg/0.5 mL vaccine TO BE ADMINISTERED BY PHARMACIST FOR IMMUNIZATION    homatropine (ISOPTO HOMATROPINE) 5 % ophthalmic solution INSTILL 1 DROP INTO RIGHT EYE ONCE A DAY    levalbuterol (XOPENEX HFA) 45 mcg/actuation inhaler Inhale 1-2 puffs into the lungs every 4 (four) hours as needed for Wheezing. This is a rescue inhaler medication and should be used as needed    metroNIDAZOLE 0.75 % Lotn APPLY A PEA SIZED AMOUNT TO FACE DAILY    pantoprazole (PROTONIX) 40 MG tablet Take 1 tablet (40 mg total) by mouth once daily. (Patient taking differently: Take 40 mg by mouth once daily. )    predniSONE (DELTASONE) 5 MG tablet Take 2 tablets (10 mg total) by mouth 2 (two) times daily. may repeat for shortness of breath.    spironolactone (ALDACTONE) 25 MG tablet Take 1 tablet (25 mg total) by  mouth once daily.     Family History     Problem Relation (Age of Onset)    Alzheimer's disease Maternal Uncle    Arthritis Mother    Asthma Mother    Cancer Maternal Grandfather, Paternal Grandmother    Depression Son    Emphysema Maternal Grandfather    Heart disease Father    Kidney disease Maternal Grandfather    Pneumonia Mother, Paternal Grandfather    Rheum arthritis Mother, Maternal Grandmother    Ulcers Father        Tobacco Use    Smoking status: Never Smoker    Smokeless tobacco: Never Used   Substance and Sexual Activity    Alcohol use: No    Drug use: No    Sexual activity: Yes     Partners: Male     Review of Systems   Constitutional: Positive for activity change, appetite change and fatigue. Negative for diaphoresis and fever.   HENT: Positive for postnasal drip. Negative for congestion, facial swelling, sneezing and sore throat.    Eyes: Negative for photophobia and redness.   Respiratory: Negative for cough and shortness of breath.    Cardiovascular: Negative for chest pain and leg swelling.   Gastrointestinal: Positive for constipation, nausea and vomiting. Negative for abdominal distention, abdominal pain and diarrhea.   Endocrine: Negative for polyphagia and polyuria.   Genitourinary: Negative for difficulty urinating, dysuria and frequency.   Musculoskeletal: Positive for arthralgias. Negative for gait problem and myalgias.        Right chest wall pain   Skin: Negative for rash.   Neurological: Negative for dizziness, speech difficulty and numbness.   Psychiatric/Behavioral: Negative for confusion.     Objective:     Vital Signs (Most Recent):  Temp: 97.5 °F (36.4 °C) (10/14/19 1011)  Pulse: 81 (10/14/19 1011)  Resp: 18 (10/14/19 1011)  BP: (!) 143/68 (10/14/19 1011)  SpO2: (!) 93 % (10/14/19 1011) Vital Signs (24h Range):  Temp:  [97.5 °F (36.4 °C)-98.2 °F (36.8 °C)] 97.5 °F (36.4 °C)  Pulse:  [] 81  Resp:  [16-20] 18  SpO2:  [90 %-100 %] 93 %  BP: ()/() 143/68      Weight: 61.6 kg (135 lb 12.9 oz)  Body mass index is 21.27 kg/m².    Physical Exam        Significant Labs:   BMP:   Recent Labs   Lab 10/14/19  0012   *      K 3.5      CO2 22*   BUN 12   CREATININE 0.9   CALCIUM 9.3     CBC:   Recent Labs   Lab 10/14/19  0012   WBC 15.35*   HGB 11.4*   HCT 36.1*          Significant Imaging: I have reviewed and interpreted all pertinent imaging results/findings within the past 24 hours.    Assessment/Plan:     * Renal infarct  Noted on CT of the abdomen and pelvis on 10/13/2019.  Trend renal function.  Consult Urology.      Transaminitis  Monitor liver function tests.  Initiated on gentle IV hydration.  Possibly related to mild dehydration from NPO status      Mild intermittent asthma without complication  Chronic resume home bronchodilators      GERD (gastroesophageal reflux disease)  Chronic resume home proton pump inhibitor      Chronic diastolic heart failure, 2014  Controlled with diet and fluid restriction.  No evidence of acute heart failure.  Gentle IV hydration      Hyperlipidemia, baseline   Chronic diet controlled.  Patient with statin intolerance      Paroxysmal atrial fibrillation, ablations X 3 1995, 1997, 9/2017, CHADS-VAS score 5, HAS-BLED score 5   Patient currently in atrial fibrillation.  Will check EKG.  Resume home beta-blocker and oral anticoagulant.      VTE Risk Mitigation (From admission, onward)         Ordered     apixaban tablet 5 mg  2 times daily      10/14/19 1153     IP VTE HIGH RISK PATIENT  Once      10/14/19 1153     Reason for No Pharmacological VTE Prophylaxis  Once      10/14/19 1153                   Yesenia Seals NP  Department of Hospital Medicine   [facility]

## 2019-10-15 ENCOUNTER — PES CALL (OUTPATIENT)
Dept: ADMINISTRATIVE | Facility: CLINIC | Age: 74
End: 2019-10-15

## 2019-10-15 ENCOUNTER — PATIENT MESSAGE (OUTPATIENT)
Dept: FAMILY MEDICINE | Facility: CLINIC | Age: 74
End: 2019-10-15

## 2019-10-15 VITALS
WEIGHT: 135.81 LBS | HEART RATE: 81 BPM | OXYGEN SATURATION: 93 % | SYSTOLIC BLOOD PRESSURE: 143 MMHG | TEMPERATURE: 98 F | HEIGHT: 67 IN | RESPIRATION RATE: 18 BRPM | DIASTOLIC BLOOD PRESSURE: 68 MMHG | BODY MASS INDEX: 21.31 KG/M2

## 2019-10-15 RX ORDER — GABAPENTIN 300 MG/1
CAPSULE ORAL
Qty: 30 CAPSULE | Refills: 0 | Status: SHIPPED | OUTPATIENT
Start: 2019-10-15 | End: 2019-11-22 | Stop reason: SDUPTHER

## 2019-10-15 NOTE — NURSING
Discharge instructions given. Follow-up appointments and medications explained. Understanding verbalized. IV and telemetry removed. Pt escorted out in wheelchair per staff.

## 2019-10-15 NOTE — PLAN OF CARE
10/15/19 1436   Final Note   Assessment Type Final Discharge Note   Anticipated Discharge Disposition Home

## 2019-10-16 ENCOUNTER — PATIENT MESSAGE (OUTPATIENT)
Dept: FAMILY MEDICINE | Facility: CLINIC | Age: 74
End: 2019-10-16

## 2019-10-17 ENCOUNTER — TELEPHONE (OUTPATIENT)
Dept: UROLOGY | Facility: CLINIC | Age: 74
End: 2019-10-17

## 2019-10-17 ENCOUNTER — PATIENT MESSAGE (OUTPATIENT)
Dept: GASTROENTEROLOGY | Facility: CLINIC | Age: 74
End: 2019-10-17

## 2019-10-17 NOTE — TELEPHONE ENCOUNTER
Spoke with patient,  MD saw her at [facility], and wanted her to follow up next week, appt made, patient verbally understood.

## 2019-10-17 NOTE — TELEPHONE ENCOUNTER
----- Message from Lottie Fuller sent at 10/17/2019 10:56 AM CDT -----  Contact: self [phone]  You instructed her to see you in one week for a hospital follow up, you do not have an appt until December.  Please call to schedule.  Thank you!

## 2019-10-18 ENCOUNTER — TELEPHONE (OUTPATIENT)
Dept: GASTROENTEROLOGY | Facility: CLINIC | Age: 74
End: 2019-10-18

## 2019-10-18 NOTE — TELEPHONE ENCOUNTER
Returned call to patient and was talking and phone hung up called patient back twice and was hung up on again.

## 2019-10-18 NOTE — TELEPHONE ENCOUNTER
----- Message from Claire Gonzalez sent at 10/18/2019 10:55 AM CDT -----  Contact: Ayla  Type: Needs Medical Advice    Who Called:  patient  Best Call Back Number: [phone] (home)   Additional Information: patient is still experiencing dry heaves--would like to know what she needs to do? Please advise--thank you

## 2019-10-20 ENCOUNTER — PATIENT MESSAGE (OUTPATIENT)
Dept: UROLOGY | Facility: CLINIC | Age: 74
End: 2019-10-20

## 2019-10-21 ENCOUNTER — PATIENT MESSAGE (OUTPATIENT)
Dept: UROLOGY | Facility: CLINIC | Age: 74
End: 2019-10-21

## 2019-10-21 ENCOUNTER — PATIENT MESSAGE (OUTPATIENT)
Dept: GASTROENTEROLOGY | Facility: CLINIC | Age: 74
End: 2019-10-21

## 2019-10-22 ENCOUNTER — OFFICE VISIT (OUTPATIENT)
Dept: FAMILY MEDICINE | Facility: CLINIC | Age: 74
End: 2019-10-22
Payer: MEDICARE

## 2019-10-22 VITALS
OXYGEN SATURATION: 98 % | DIASTOLIC BLOOD PRESSURE: 76 MMHG | WEIGHT: 127.06 LBS | TEMPERATURE: 98 F | HEART RATE: 82 BPM | BODY MASS INDEX: 19.94 KG/M2 | SYSTOLIC BLOOD PRESSURE: 132 MMHG | HEIGHT: 67 IN

## 2019-10-22 DIAGNOSIS — I48.0 PAROXYSMAL ATRIAL FIBRILLATION: ICD-10-CM

## 2019-10-22 DIAGNOSIS — I10 ESSENTIAL HYPERTENSION: ICD-10-CM

## 2019-10-22 DIAGNOSIS — J45.30 MILD PERSISTENT ASTHMA WITHOUT COMPLICATION: ICD-10-CM

## 2019-10-22 DIAGNOSIS — R11.0 NAUSEA: ICD-10-CM

## 2019-10-22 DIAGNOSIS — E78.2 MIXED HYPERLIPIDEMIA: ICD-10-CM

## 2019-10-22 DIAGNOSIS — I50.32 CHRONIC DIASTOLIC HEART FAILURE: ICD-10-CM

## 2019-10-22 DIAGNOSIS — N12 PYELONEPHRITIS: ICD-10-CM

## 2019-10-22 DIAGNOSIS — Z79.01 ANTICOAGULANT LONG-TERM USE: ICD-10-CM

## 2019-10-22 DIAGNOSIS — Z09 HOSPITAL DISCHARGE FOLLOW-UP: Primary | ICD-10-CM

## 2019-10-22 DIAGNOSIS — K21.9 GASTROESOPHAGEAL REFLUX DISEASE WITHOUT ESOPHAGITIS: ICD-10-CM

## 2019-10-22 DIAGNOSIS — F33.41 RECURRENT MAJOR DEPRESSIVE DISORDER, IN PARTIAL REMISSION: ICD-10-CM

## 2019-10-22 DIAGNOSIS — J45.31 MILD PERSISTENT ASTHMA WITH ACUTE EXACERBATION: ICD-10-CM

## 2019-10-22 DIAGNOSIS — N28.0 RENAL INFARCT: ICD-10-CM

## 2019-10-22 PROCEDURE — 99999 PR PBB SHADOW E&M-EST. PATIENT-LVL V: CPT | Mod: PBBFAC,,, | Performed by: NURSE PRACTITIONER

## 2019-10-22 PROCEDURE — 99214 OFFICE O/P EST MOD 30 MIN: CPT | Mod: S$PBB,,, | Performed by: NURSE PRACTITIONER

## 2019-10-22 PROCEDURE — 99999 PR PBB SHADOW E&M-EST. PATIENT-LVL V: ICD-10-PCS | Mod: PBBFAC,,, | Performed by: NURSE PRACTITIONER

## 2019-10-22 PROCEDURE — 99215 OFFICE O/P EST HI 40 MIN: CPT | Mod: PBBFAC,PO | Performed by: NURSE PRACTITIONER

## 2019-10-22 PROCEDURE — 99214 PR OFFICE/OUTPT VISIT, EST, LEVL IV, 30-39 MIN: ICD-10-PCS | Mod: S$PBB,,, | Performed by: NURSE PRACTITIONER

## 2019-10-22 RX ORDER — ONDANSETRON 4 MG/1
TABLET, FILM COATED ORAL
COMMUNITY
Start: 2019-10-18 | End: 2019-10-22 | Stop reason: ALTCHOICE

## 2019-10-22 RX ORDER — PROMETHAZINE HYDROCHLORIDE 12.5 MG/1
12.5 SUPPOSITORY RECTAL EVERY 6 HOURS PRN
Qty: 12 SUPPOSITORY | Refills: 0 | Status: SHIPPED | OUTPATIENT
Start: 2019-10-22 | End: 2021-01-05 | Stop reason: CLARIF

## 2019-10-22 RX ORDER — KETOROLAC TROMETHAMINE 10 MG/1
10 TABLET, FILM COATED ORAL EVERY 6 HOURS PRN
Refills: 0 | COMMUNITY
Start: 2019-10-14 | End: 2019-11-08 | Stop reason: SDUPTHER

## 2019-10-22 RX ORDER — FLUTICASONE FUROATE AND VILANTEROL 200; 25 UG/1; UG/1
1 POWDER RESPIRATORY (INHALATION) DAILY
Qty: 1 EACH | Refills: 11 | Status: SHIPPED | OUTPATIENT
Start: 2019-10-22 | End: 2020-07-21 | Stop reason: SDUPTHER

## 2019-10-22 NOTE — PROGRESS NOTES
Subjective:       Patient ID: Ayla Dela Cruz is a 74 y.o. female.    Chief Complaint: Hospital Follow Up    Chief Complaint  Chief Complaint   Patient presents with    Hospital Follow Up     Transitional Care Note    Family and/or Caretaker present at visit?  Yes.  Diagnostic tests reviewed/disposition: No diagnosic tests pending after this hospitalization.  Disease/illness education: Pyelonephritis and Kidney Infarct  Home health/community services discussion/referrals: Patient does not have home health established from hospital visit.  They do not need home health.  If needed, we will set up home health for the patient.   Establishment or re-establishment of referral orders for community resources: No other necessary community resources.   Discussion with other health care providers: No discussion with other health care providers necessary.         HPI  Ayla Dela Cruz is a 74 y.o. female with medical diagnoses as listed in the medical history and problem list that presents for a hospital follow-up visit.  Patient went to [facility] on 10/13/2019 three days after having a colonoscopy with complaints of right-sided abdominal pain, nausea, and vomiting.  At that emergency room visit the patient had an EKG done that showed sinus rhythm with a left bundle branch block.  She did have elevated white blood cells 15,000 but it was noted that she had been taking prednisone for her asthma/interstitial lung disease. She had a CT scan of the abdomen that showed an abnormal right nephrogram possible pyelonephritis versus infarct while in the emergency room.  She was discharged home with no treatment ordered.  The patient returned to [facility] on 10/14/2019 for a scheduled endoscopy with Gastroenterology and due to the previous results of her CT scan of the abdomen and after consult with Urology, Dr. Carter, the patient was to be admitted overnight to start IV antibiotics,  however, after review the patient was discharged home  on October 14, 2019 with a prescription for Bactrim DS for pyelonephritis. A urinalysis was done but culture was not as it was not indicated. The patient completed 6 days of the Bactrim DS treatment and stopped due to stomach upset and confusion. She states Dr. Carter is aware that she stopped the Bactrim DS, no additional antibiotic ordered, she has an appointment with him on Friday. The patient's endoscopy showed gastritis, two gastric polyps, and a small hiatal hernia.  She was placed on sucralfate for 10 days.  The patient is regularly followed for atrial fibrillation taking anticoagulant Eliquis 5 mg 2 times daily, hypertension, GERD, CHF, depression, generalized anxiety disorder, asthma, interstitial lung disease, and glaucoma.  Blood pressure today is 132/76.  BMI today is 19.90 and the patient has lost 6 lb since her last visit on 1/28/2019.      PAST MEDICAL HISTORY:  Past Medical History:   Diagnosis Date    Anticoagulant long-term use     Anxiety     Arthritis     Atrial fibrillation     Cancer     skin    CHF (congestive heart failure)     Depression     DVT (deep venous thrombosis)     GERD (gastroesophageal reflux disease)     Glaucoma (increased eye pressure)     Hyperlipidemia     diet controlled    Hypertension     Interstitial lung disease     Mild persistent asthma without complication 11/12/2018    Pneumonia 1/31/2014    Stroke 6-3-14    Stroke     TIA (transient ischemic attack)     TIA (transient ischemic attack)        PAST SURGICAL HISTORY:  Past Surgical History:   Procedure Laterality Date    2 heart ablations      3 in total    bilateral cataracts      CHOLECYSTECTOMY      COLONOSCOPY N/A 1/19/2017    Procedure: COLONOSCOPY and EGD;  Surgeon: Jonathan Schultz MD;  Location: [facility];  Service: Endoscopy;  Laterality: N/A;    COLONOSCOPY N/A 10/10/2019    Procedure: COLONOSCOPY;  Surgeon: Alex Christian MD;   Location: [facility];  Service: Endoscopy;  Laterality: N/A;    ESOPHAGOGASTRODUODENOSCOPY N/A 10/14/2019    Procedure: EGD (ESOPHAGOGASTRODUODENOSCOPY);  Surgeon: Alex Christian MD;  Location: [facility];  Service: Endoscopy;  Laterality: N/A;    INJECTION OF ANESTHETIC AGENT AROUND MEDIAL BRANCH NERVES INNERVATING CERVICAL FACET JOINT Right 6/7/2018    Procedure: BLOCK, NERVE, FACET JOINT, MEDIAL BRANCH, CERVICAL;  Surgeon: Galo Clancy MD;  Location: [facility];  Service: Pain Management;  Laterality: Right;  C4, 5, 6    OPEN REDUCTION AND INTERNAL FIXATION (ORIF) OF INJURY OF ANKLE Right 11/1/2018    Procedure: ORIF, ANKLE;  Surgeon: Wilfredo Aguero MD;  Location: [facility];  Service: Orthopedics;  Laterality: Right;    pyloristenosis      RADIOFREQUENCY ABLATION OF LUMBAR MEDIAL BRANCH NERVE AT SINGLE LEVEL Bilateral 9/21/2018    Procedure: RADIOFREQUENCY ABLATION, NERVE, SPINAL, LUMBAR, MEDIAL BRANCH, 1 LEVEL;  Surgeon: Galo Clancy MD;  Location: [facility];  Service: Pain Management;  Laterality: Bilateral;  L3, 4, 5    RADIOFREQUENCY ABLATION OF LUMBAR MEDIAL BRANCH NERVE AT SINGLE LEVEL Bilateral 2/19/2019    Procedure: Radiofrequency Ablation, Nerve, Spinal, Lumbar, Medial Branch, 1 Level;  Surgeon: Galo Clancy MD;  Location: [facility];  Service: Pain Management;  Laterality: Bilateral;  L3, 4, 5     RADIOFREQUENCY THERMAL COAGULATION OF MEDIAL BRANCH OF POSTERIOR RAMUS OF CERVICAL SPINAL NERVE Right 7/3/2018    Procedure: RADIOFREQUENCY THERMAL COAGULATION, NERVE, SPINAL, CERVICAL, MEDIAL BRANCH OF POSTERIOR RAMUS;  Surgeon: Galo Clancy MD;  Location: [facility] OR;  Service: Pain Management;  Laterality: Right;  C4,5,6 - Burned at 80 degrees C. for 75 seconds each site    RADIOFREQUENCY THERMAL COAGULATION OF MEDIAL BRANCH OF POSTERIOR RAMUS OF CERVICAL SPINAL NERVE Right 7/23/2019    Procedure: RADIOFREQUENCY THERMAL COAGULATION, NERVE, SPINAL, CERVICAL, POSTERIOR RAMUS, MEDIAL BRANCH;  Surgeon: Galo Clancy  MD;  Location: [facility];  Service: Pain Management;  Laterality: Right;  C4,5,6    RADIOFREQUENCY THERMOCOAGULATION Bilateral 9/10/2019    Procedure: RADIOFREQUENCY THERMAL COAGULATION LUMBAR;  Surgeon: Galo Clancy MD;  Location: [facility];  Service: Pain Management;  Laterality: Bilateral;  L3,4,5 - Burned at 80 degrees C. for 60 seconds x 2 each site    skin cancer removal       TONSILLECTOMY         SOCIAL HISTORY:  Social History     Socioeconomic History    Marital status:      Spouse name: Not on file    Number of children: Not on file    Years of education: Not on file    Highest education level: Not on file   Occupational History    Not on file   Social Needs    Financial resource strain: Not hard at all    Food insecurity:     Worry: Never true     Inability: Never true    Transportation needs:     Medical: No     Non-medical: No   Tobacco Use    Smoking status: Never Smoker    Smokeless tobacco: Never Used   Substance and Sexual Activity    Alcohol use: No     Frequency: Monthly or less     Drinks per session: 1 or 2     Binge frequency: Never    Drug use: No    Sexual activity: Yes     Partners: Male   Lifestyle    Physical activity:     Days per week: 0 days     Minutes per session: 0 min    Stress: To some extent   Relationships    Social connections:     Talks on phone: Three times a week     Gets together: Once a week     Attends Zoroastrian service: Not on file     Active member of club or organization: Yes     Attends meetings of clubs or organizations: More than 4 times per year     Relationship status:    Other Topics Concern    Not on file   Social History Narrative    Not on file       FAMILY HISTORY:  Family History   Problem Relation Age of Onset    Heart disease Father     Ulcers Father     Arthritis Mother     Asthma Mother     Rheum arthritis Mother     Pneumonia Mother     Depression Son     Alzheimer's disease Maternal Uncle     Rheum arthritis  Maternal Grandmother     Emphysema Maternal Grandfather     Cancer Maternal Grandfather         kidney    Kidney disease Maternal Grandfather     Cancer Paternal Grandmother         lung= smoker    Pneumonia Paternal Grandfather     Breast cancer Neg Hx     Ovarian cancer Neg Hx        ALLERGIES AND MEDICATIONS: updated and reviewed.  Review of patient's allergies indicates:   Allergen Reactions    Bactrim [sulfamethoxazole-trimethoprim] Other (See Comments)     Upset stomach, dry heaves, confusion    Atorvastatin      Other reaction(s): Joint pain    Augmentin [amoxicillin-pot clavulanate]      Other reaction(s): Mental Status Change    Baclofen (bulk) Nausea And Vomiting    Ciprofloxacin (bulk)     Decongest tabs      Other reaction(s): increased heart rate    Erythromycin Other (See Comments)    Flecainide Hives     And SOB. No reaction to Lidocaine     Fluoxetine      Other reaction(s): heart palpitations  Other reaction(s): anxiety    Lisinopril Other (See Comments)     cough    Losartan Other (See Comments)     Hypotension with lightheadedness    Morphine Other (See Comments)     confusion    Tramadol Other (See Comments)     SOB, low BP    Venlafaxine analogues      Changes in BP and increases heart rate       Afrin [oxymetazoline] Palpitations    Caffeine Palpitations    Tizanidine Anxiety     dizziness     Current Outpatient Medications   Medication Sig Dispense Refill    ammonium lactate (LAC-HYDRIN) 12 % lotion       apixaban (ELIQUIS) 5 mg Tab Take 1 tablet (5 mg total) by mouth 2 (two) times daily. 60 tablet 11    ATROPINE SULFATE, PF, OPHT Apply to eye.      dorzolamide-timolol (COSOPT) 2-0.5 % ophthalmic solution Place 1 drop into both eyes 2 (two) times daily. Twice a day      fluticasone (FLONASE) 50 mcg/actuation nasal spray 1 spray (50 mcg total) by Each Nare route once daily. 1 Bottle 11    gabapentin (NEURONTIN) 300 MG capsule TAKE ONE CAPSULE BY MOUTH ONCE EVERY  EVENING 30 capsule 0    homatropine (ISOPTO HOMATROPINE) 5 % ophthalmic solution INSTILL 1 DROP INTO RIGHT EYE ONCE A DAY  3    ketorolac (TORADOL) 10 mg tablet Take 10 mg by mouth every 6 (six) hours as needed.  0    levalbuterol (XOPENEX HFA) 45 mcg/actuation inhaler Inhale 1-2 puffs into the lungs every 4 (four) hours as needed for Wheezing. This is a rescue inhaler medication and should be used as needed 1 Inhaler 11    lorazepam (ATIVAN) 1 MG tablet TAKE 1 TABLET BY MOUTH DAILY (Patient taking differently: TAKE 1 TABLET BY MOUTH DAILY PRN) 30 tablet 3    metoprolol tartrate (LOPRESSOR) 50 MG tablet TAKE 1 TABLET (50 MG TOTAL) BY MOUTH 2 (TWO) TIMES DAILY. 180 tablet 2    metroNIDAZOLE 0.75 % Lotn APPLY A PEA SIZED AMOUNT TO FACE DAILY  2    pantoprazole (PROTONIX) 40 MG tablet Take 1 tablet (40 mg total) by mouth once daily. (Patient taking differently: Take 40 mg by mouth once daily. ) 90 tablet 3    predniSONE (DELTASONE) 5 MG tablet Take 2 tablets (10 mg total) by mouth 2 (two) times daily. may repeat for shortness of breath. 36 tablet 1    rosuvastatin (CRESTOR) 40 MG Tab TAKE 1 TABLET (40 MG TOTAL) BY MOUTH EVERY EVENING. 90 tablet 2    sucralfate (CARAFATE) 1 gram tablet Take 1 tablet (1 g total) by mouth 4 (four) times daily. for 10 days 40 tablet 0    VIIBRYD 40 mg Tab tablet Take 40 mg by mouth once daily.  2    dapsone 25 MG Tab TAKE 2 TABLETS BY MOUTH ONCE DAILY RECHECK CBC IN 1MONTH  0    fluticasone furoate-vilanterol (BREO ELLIPTA) 200-25 mcg/dose DsDv diskus inhaler Inhale 1 puff into the lungs once daily. 1 each 11    FLUZONE HIGH-DOSE 2018-19, PF, 180 mcg/0.5 mL vaccine TO BE ADMINISTERED BY PHARMACIST FOR IMMUNIZATION  0    promethazine (PHENERGAN) 12.5 MG Supp Place 1 suppository (12.5 mg total) rectally every 6 (six) hours as needed. 12 suppository 0    spironolactone (ALDACTONE) 25 MG tablet Take 1 tablet (25 mg total) by mouth once daily. 90 tablet 3     No current  facility-administered medications for this visit.      Facility-Administered Medications Ordered in Other Visits   Medication Dose Route Frequency Provider Last Rate Last Dose    bupivacaine (PF) 0.25% (2.5 mg/ml) injection    PRRASHEL Clancy MD   6 mL at 02/19/19 1314    lidocaine (PF) 10 mg/ml (1%) injection    PRN Galo Clancy MD   9 mL at 02/19/19 1304    lidocaine (PF) 20 mg/ml (2%) injection    PRN Galo Clancy MD   6 mL at 02/19/19 1310    methylPREDNISolone acetate injection    PRN Galo Clancy MD   80 mg at 02/19/19 1314       I have reviewed the patient's medical, family, and social history in detail and updated the computerized patient record.    Review of Systems   Constitutional: Positive for activity change and chills. Negative for appetite change, fever and unexpected weight change.        No regular exercise, less active with illness. Appetite improving, some chills, no fever, energy improving.    HENT: Negative for congestion, hearing loss, rhinorrhea and trouble swallowing.    Eyes: Negative for discharge and visual disturbance.        Vision corrected with glasses.   Respiratory: Positive for shortness of breath. Negative for cough, chest tightness and wheezing.         Shortness of breath some days, using Breo daily, using albuterol inhaler infrequently, follows up with pulmonology Dr. Bryson.   Cardiovascular: Positive for palpitations. Negative for chest pain and leg swelling.        Occasional palpitations, feels irregular.    Gastrointestinal: Positive for nausea. Negative for abdominal pain, blood in stool, constipation, diarrhea and vomiting.        Patient is feeling much improved since stopping the Bactrim DS. Is requesting medication for nausea to use as needed.   Endocrine: Negative for polydipsia and polyuria.   Genitourinary: Negative for difficulty urinating, dysuria, hematuria and menstrual problem.   Musculoskeletal: Negative for arthralgias, joint swelling and neck pain.   Skin:  "Positive for color change.        Bruising to bilateral forearms.   Neurological: Positive for weakness. Negative for dizziness, numbness and headaches.        Generalized weakness due to illness   Hematological: Bruises/bleeds easily.        Bruises easily with eliquis.    Psychiatric/Behavioral: Positive for confusion. Negative for dysphoric mood and sleep disturbance. The patient is not nervous/anxious.         Has had some confusion with the Bactrim DS and this has cleared since stopping the medication.          Objective:      Vitals:    10/22/19 0959   BP: 132/76   Pulse: 82   Temp: 97.8 °F (36.6 °C)   SpO2: 98%   Weight: 57.6 kg (127 lb 0.8 oz)   Height: 5' 7" (1.702 m)     Physical Exam   Constitutional: She is oriented to person, place, and time. Vital signs are normal. She appears well-developed and well-nourished. No distress.   Blood pressure 132/76   HENT:   Head: Normocephalic and atraumatic.   Right Ear: Hearing, tympanic membrane, external ear and ear canal normal.   Left Ear: Hearing, tympanic membrane, external ear and ear canal normal.   Nose: Nose normal. No mucosal edema.   Mouth/Throat: Oropharynx is clear and moist and mucous membranes are normal. Normal dentition. No oropharyngeal exudate.   Eyes: Conjunctivae, EOM and lids are normal. Right eye exhibits no discharge. Left eye exhibits no discharge. Right conjunctiva is not injected. Left conjunctiva is not injected. Right pupil is not round and not reactive. Left pupil is not round and not reactive. Pupils are unequal.   Bilateral pupils are not round, not equal, and do not react to light.   Neck: Normal range of motion. Neck supple. Normal carotid pulses present. Carotid bruit is not present. No thyromegaly present.   Cardiovascular: Normal rate, regular rhythm, S1 normal, S2 normal, normal heart sounds, intact distal pulses and normal pulses.   No murmur heard.  Pulses:       Carotid pulses are 2+ on the right side, and 2+ on the left " side.       Radial pulses are 2+ on the right side, and 2+ on the left side.        Dorsalis pedis pulses are 2+ on the right side, and 2+ on the left side.        Posterior tibial pulses are 2+ on the right side, and 2+ on the left side.   No edema   Pulmonary/Chest: Effort normal and breath sounds normal. No respiratory distress. She has no decreased breath sounds. She has no wheezes. She has no rhonchi. She has no rales.   Abdominal: Soft. Normal appearance and bowel sounds are normal. She exhibits no distension, no abdominal bruit, no pulsatile midline mass and no mass. There is no hepatosplenomegaly. There is no tenderness. There is no CVA tenderness. No hernia.   Musculoskeletal: Normal range of motion. She exhibits no edema, tenderness or deformity.   Lymphadenopathy:        Head (right side): No submental, no submandibular and no tonsillar adenopathy present.        Head (left side): No submental, no submandibular and no tonsillar adenopathy present.     She has no cervical adenopathy.        Right: No supraclavicular adenopathy present.        Left: No supraclavicular adenopathy present.   Neurological: She is alert and oriented to person, place, and time. She has normal strength. Gait normal.   Skin: Skin is warm, dry and intact. Bruising noted. No rash noted. She is not diaphoretic. No erythema. No pallor.   Bruising noted to bilateral forearms   Psychiatric: Her speech is normal and behavior is normal. Judgment and thought content normal. Her mood appears not anxious. Cognition and memory are normal. She exhibits a depressed mood.   Patient tearful at times during office visit which she states is due to the Bactrim DS.   Nursing note and vitals reviewed.        Assessment:       1. Hospital discharge follow-up    2. Pyelonephritis    3. Renal infarct    4. Mild persistent asthma without complication    5. Essential hypertension    6. Mixed hyperlipidemia    7. Paroxysmal atrial fibrillation, ablations X  3 1995, 1997, 9/2017, CHADS-VAS score 5, HAS-BLED score 5     8. Chronic diastolic heart failure, 2014    9. Anticoagulant long-term use    10. Gastroesophageal reflux disease without esophagitis    11. Nausea    12. Recurrent major depressive disorder, in partial remission    13. Body mass index (BMI) 19.9 or less, adult          Plan:       Ayla was seen today for hospital follow up.    Diagnoses and all orders for this visit:    Hospital discharge follow-up   Hospital records, discharge summary,  test results, blood work results reviewed, medication list reconciled.  Pyelonephritis   CT scan of Abd and Pelvis 10/14/19: Abnormal right nephrogram with some considerations to include acute infarct and pyelonephritis.  Neoplasm is less favored but not entirely excluded.  Urologic consultation is recommended   Patient states right abdominal and flank pain has resolved   Patient stopped Bactrim DS before finishing all of medication due to side effects, urology aware   Keep scheduled appointment with Dr. Carter on Friday 10/25/19    Renal infarct   CT scan of Abd and Pelvis 10/14/19: Abnormal right nephrogram with some considerations to include acute infarct and pyelonephritis.  Neoplasm is less favored but not entirely excluded.  Urologic consultation is recommended   Patient states right abdominal and flank pain has resolved   Patient stopped Bactrim DS before finishing all of medication due to side effects, urology aware   Keep scheduled appointment with Dr. Carter on Friday 10/25/19   Any further required testing will be ordered by Dr. Carter    Mild persistent asthma without complication   Stable on current medications, continue as is and follow up with pulmonology Dr. Bryson as instructed  Essential hypertension    Blood pressure controlled 132/76, continue current medication without change  Mixed hyperlipidemia     Lab Results   Component Value Date    CHOL 188 01/29/2019    CHOL 136 01/26/2018    CHOL 188  01/18/2017     Lab Results   Component Value Date    HDL 85 (H) 01/29/2019    HDL 53 01/26/2018    HDL 78 (H) 01/18/2017     Lab Results   Component Value Date    LDLCALC 90.6 01/29/2019    LDLCALC 68.2 01/26/2018    LDLCALC 92.0 01/18/2017     Lab Results   Component Value Date    TRIG 62 01/29/2019    TRIG 74 01/26/2018    TRIG 90 01/18/2017     Lab Results   Component Value Date    CHOLHDL 45.2 01/29/2019    CHOLHDL 39.0 01/26/2018    CHOLHDL 41.5 01/18/2017    Continue crestor 40 mg daily  Paroxysmal atrial fibrillation, ablations X 3 1995, 1997, 9/2017, CHADS-VAS score 5, HAS-BLED score 5    Auscultated sinus rhythm in office today   Continue current medications and follow up with Dr. Jurado as instructed  Chronic diastolic heart failure, 2014   Continue current medications and follow up with Dr. Jurado as instructed  Anticoagulant long-term use   Stable on eliquis 5 mg BID, continue as is  Gastroesophageal reflux disease without esophagitis   Endoscopy 10/15/18:  gastritis, two gastric polyps, and a small hiatal hernia.   Patient to complete 10 day course of sucralafate 1 gm QID, advised to dissolve tablet in small amount of water when taking, this is not a sulfa drug   Continue protonix 40 mg daily   Follow up with gastroenterology, Dr. Schwarz, as instructed  Nausea  -     promethazine (PHENERGAN) 12.5 MG Supp; Place 1 suppository (12.5 mg total) rectally every 6 (six) hours as needed.    Recurrent major depressive disorder, in partial remission   Continue current medications which patient states are normally effective, experienced some depression symptoms while taking Bactrim DS which have improved since stopping the medication 2 days ago    Body mass index (BMI) 19.9 or less, adult   BMI today is 19.90 and the patient has lost 6 pounds since her last visit on 1/28/19   Maintain healthy weight with heathy diet and exercise/active lifestyle    Follow up for reschedule annual with me, PCP team, in January, cancel  appt with Lindy.      Patient readiness: acceptance and barriers:none    During the course of the visit the patient was educated and counseled about the following:     Hypertension:   Medication: no change.  Dietary sodium restriction.  Regular aerobic exercise.  Follow up: 3 months and as needed.    Goals: Hypertension: Reduce Blood Pressure    Did patient meet goals/outcomes: Yes    The following self management tools provided: declined    Patient Instructions (the written plan) was given to the patient/family.     Time spent with patient: 45 minutes    Barriers to medications present (no )    Adverse reactions to current medications (no)    Over the counter medications reviewed (Yes)

## 2019-10-22 NOTE — TELEPHONE ENCOUNTER
----- Message from Alona Jansen sent at 10/22/2019  3:03 PM CDT -----  Contact: Patient  Type:  RX Refill Request    Who Called:  Patient  Refill or New Rx:  Refill  RX Name and Strength:  fluticasone-vilanterol (BREO ELLIPTA) 200-25 mcg/dose DsDv diskus inhaler  How is the patient currently taking it? (ex. 1XDay):  1 time daily  Is this a 30 day or 90 day RX:    Preferred Pharmacy with phone number:    [facility]/pharmacy #[id number] - [place] - [address] [place]  [address] [place]  [place] [address]  Phone: [phone] Fax: [phone]  Local or Mail Order:  Local  Ordering Provider:  Dr. Lars Oliver Call Back Number:  [phone]  Additional Information:  Calling to request refill on inhaler.

## 2019-10-23 PROBLEM — K62.5 RECTAL BLEEDING: Status: RESOLVED | Noted: 2019-10-10 | Resolved: 2019-10-23

## 2019-10-23 PROBLEM — M54.16 LUMBAR RADICULITIS: Status: RESOLVED | Noted: 2018-09-21 | Resolved: 2019-10-23

## 2019-10-23 PROBLEM — S82.61XA FRACTURE OF RIGHT ANKLE, LATERAL MALLEOLUS: Status: RESOLVED | Noted: 2018-11-01 | Resolved: 2019-10-23

## 2019-10-23 PROBLEM — M54.12 CERVICAL RADICULITIS: Status: RESOLVED | Noted: 2018-01-29 | Resolved: 2019-10-23

## 2019-10-23 PROBLEM — M47.892 OTHER SPONDYLOSIS, CERVICAL REGION: Status: RESOLVED | Noted: 2019-07-23 | Resolved: 2019-10-23

## 2019-10-23 PROBLEM — I49.2: Status: RESOLVED | Noted: 2018-03-26 | Resolved: 2019-10-23

## 2019-10-23 PROBLEM — Z86.79 S/P ABLATION OF ATRIAL FIBRILLATION: Status: RESOLVED | Noted: 2017-11-01 | Resolved: 2019-10-23

## 2019-10-23 PROBLEM — I48.92 ATRIAL FLUTTER: Status: RESOLVED | Noted: 2017-07-12 | Resolved: 2019-10-23

## 2019-10-23 PROBLEM — Z12.9 CANCER SCREENING: Status: RESOLVED | Noted: 2017-01-05 | Resolved: 2019-10-23

## 2019-10-23 PROBLEM — Z98.890 S/P ABLATION OF ATRIAL FIBRILLATION: Status: RESOLVED | Noted: 2017-11-01 | Resolved: 2019-10-23

## 2019-10-23 PROBLEM — T14.8XXA MUSCLE STRAIN: Status: RESOLVED | Noted: 2017-11-15 | Resolved: 2019-10-23

## 2019-10-25 ENCOUNTER — OFFICE VISIT (OUTPATIENT)
Dept: UROLOGY | Facility: CLINIC | Age: 74
End: 2019-10-25
Payer: MEDICARE

## 2019-10-25 ENCOUNTER — HOSPITAL ENCOUNTER (OUTPATIENT)
Dept: RADIOLOGY | Facility: HOSPITAL | Age: 74
Discharge: HOME | End: 2019-10-25
Attending: UROLOGY
Payer: MEDICARE

## 2019-10-25 VITALS
DIASTOLIC BLOOD PRESSURE: 80 MMHG | WEIGHT: 127.44 LBS | BODY MASS INDEX: 20 KG/M2 | SYSTOLIC BLOOD PRESSURE: 139 MMHG | HEART RATE: 72 BPM | HEIGHT: 67 IN

## 2019-10-25 DIAGNOSIS — R93.89 GU ORGAN IMAGING ABNORMALITY: ICD-10-CM

## 2019-10-25 DIAGNOSIS — N12 PYELONEPHRITIS: ICD-10-CM

## 2019-10-25 DIAGNOSIS — R35.0 URINARY FREQUENCY: Primary | ICD-10-CM

## 2019-10-25 LAB
BILIRUB SERPL-MCNC: ABNORMAL MG/DL
BLOOD URINE, POC: ABNORMAL
COLOR, POC UA: ABNORMAL
GLUCOSE UR QL STRIP: ABNORMAL
KETONES UR QL STRIP: ABNORMAL
LEUKOCYTE ESTERASE URINE, POC: ABNORMAL
NITRITE, POC UA: ABNORMAL
PH, POC UA: 5
PROTEIN, POC: ABNORMAL
SPECIFIC GRAVITY, POC UA: 1020
UROBILINOGEN, POC UA: ABNORMAL

## 2019-10-25 PROCEDURE — 99999 PR PBB SHADOW E&M-EST. PATIENT-LVL III: CPT | Mod: PBBFAC,,, | Performed by: UROLOGY

## 2019-10-25 PROCEDURE — 99999 PR PBB SHADOW E&M-EST. PATIENT-LVL III: ICD-10-PCS | Mod: PBBFAC,,, | Performed by: UROLOGY

## 2019-10-25 PROCEDURE — 99213 OFFICE O/P EST LOW 20 MIN: CPT | Mod: PBBFAC,PN,25 | Performed by: UROLOGY

## 2019-10-25 PROCEDURE — 81002 URINALYSIS NONAUTO W/O SCOPE: CPT | Mod: PBBFAC,PN | Performed by: UROLOGY

## 2019-10-25 PROCEDURE — 76770 US EXAM ABDO BACK WALL COMP: CPT | Mod: TC

## 2019-10-25 PROCEDURE — 76770 US EXAM ABDO BACK WALL COMP: CPT | Mod: 26,,, | Performed by: RADIOLOGY

## 2019-10-25 PROCEDURE — 99214 OFFICE O/P EST MOD 30 MIN: CPT | Mod: 25,S$PBB,, | Performed by: UROLOGY

## 2019-10-25 PROCEDURE — 76770 US RETROPERITONEAL COMPLETE: ICD-10-PCS | Mod: 26,,, | Performed by: RADIOLOGY

## 2019-10-25 PROCEDURE — 81000 URINALYSIS NONAUTO W/SCOPE: CPT | Mod: PBBFAC,PN | Performed by: UROLOGY

## 2019-10-25 PROCEDURE — 99214 PR OFFICE/OUTPT VISIT, EST, LEVL IV, 30-39 MIN: ICD-10-PCS | Mod: 25,S$PBB,, | Performed by: UROLOGY

## 2019-10-25 NOTE — PROGRESS NOTES
OFFICE NOTE  [unfilled]  [id number]  10/25/2019       CHIEF COMPLAINT:  .  Acute pyelonephritis     HISTORY OF PRESENT ILLNESS:  .  This 74-year-old female returns for recheck of the she was hospitalized with acute right pyelonephritis on 10/14/2019.  She was discharged home on Bactrim DS p.o. b.i.d. but developed an allergic reaction up to 6 days and she discontinued it.  On today's visit patient is feeling well is completely symptom free.  CT scan done during hospitalization did reveal abnormal findings of the right kidney consistent with possible right acute infarct and or pyelonephritis and hypodense masses in various parts the right kidney.  Left kidney was completely normal.    Physical exam:  Abdomen - soft benign nontender no masses no hernias no organomegaly                                UA - negative pH 5.0     FINAL IMPRESSION: .  Right pyelonephritis that appears to have resolved bladder but abnormal imaging of the right kidney on CT scan.       RECOMMENDATIONS:   Renal ultrasound.  Possibility of repeat CT scan may need to be considered pending the ultrasound findings.

## 2019-10-29 ENCOUNTER — OFFICE VISIT (OUTPATIENT)
Dept: SURGERY | Facility: CLINIC | Age: 74
End: 2019-10-29
Payer: MEDICARE

## 2019-10-29 VITALS
HEIGHT: 67 IN | WEIGHT: 128.31 LBS | BODY MASS INDEX: 20.14 KG/M2 | DIASTOLIC BLOOD PRESSURE: 87 MMHG | TEMPERATURE: 98 F | SYSTOLIC BLOOD PRESSURE: 138 MMHG | HEART RATE: 97 BPM

## 2019-10-29 DIAGNOSIS — K64.9 BLEEDING HEMORRHOIDS: Primary | ICD-10-CM

## 2019-10-29 PROCEDURE — 46600 DIAGNOSTIC ANOSCOPY SPX: CPT | Mod: S$PBB,,, | Performed by: SURGERY

## 2019-10-29 PROCEDURE — 99204 PR OFFICE/OUTPT VISIT, NEW, LEVL IV, 45-59 MIN: ICD-10-PCS | Mod: S$PBB,25,, | Performed by: SURGERY

## 2019-10-29 PROCEDURE — 46600 DIAGNOSTIC ANOSCOPY SPX: CPT | Mod: PBBFAC,PO | Performed by: SURGERY

## 2019-10-29 PROCEDURE — 99999 PR PBB SHADOW E&M-EST. PATIENT-LVL III: CPT | Mod: PBBFAC,,, | Performed by: SURGERY

## 2019-10-29 PROCEDURE — 99999 PR PBB SHADOW E&M-EST. PATIENT-LVL III: ICD-10-PCS | Mod: PBBFAC,,, | Performed by: SURGERY

## 2019-10-29 PROCEDURE — 99213 OFFICE O/P EST LOW 20 MIN: CPT | Mod: PBBFAC,PO | Performed by: SURGERY

## 2019-10-29 PROCEDURE — 99204 OFFICE O/P NEW MOD 45 MIN: CPT | Mod: S$PBB,25,, | Performed by: SURGERY

## 2019-10-29 PROCEDURE — 46600 PR DIAG2STIC A2SCOPY: ICD-10-PCS | Mod: S$PBB,,, | Performed by: SURGERY

## 2019-10-29 NOTE — LETTER
October 30, 2019      Alex Christian MD  [address]  [address]  [facility] [address]           [facility]ly [facility] General Surgery  [address]  [place] [facility] [address]  Phone: [phone]          Patient: Ayla Dela Cruz   MR Number: [id number]   YOB: 1945   Date of Visit: 10/29/2019       Dear Dr. Alex Christian:    Thank you for referring Ayla Dela Cruz to me for evaluation. Attached you will find relevant portions of my assessment and plan of care.    If you have questions, please do not hesitate to call me. I look forward to following Ayla Dela Cruz along with you.    Sincerely,    Prem Bonilla MD    Enclosure  CC:  No Recipients    If you would like to receive this communication electronically, please contact [email or web address] or [phone] to request more information on [employer] access.    For providers and/or their staff who would like to refer a patient to [facility], please contact us through our one-stop-shop provider referral line, [facility], at [phone].    If you feel you have received this communication in error or would no longer like to receive these types of communications, please e-mail [email or web address]

## 2019-10-30 ENCOUNTER — TELEPHONE (OUTPATIENT)
Dept: UROLOGY | Facility: CLINIC | Age: 74
End: 2019-10-30

## 2019-10-30 ENCOUNTER — PATIENT MESSAGE (OUTPATIENT)
Dept: CARDIOLOGY | Facility: CLINIC | Age: 74
End: 2019-10-30

## 2019-10-30 NOTE — TELEPHONE ENCOUNTER
----- Message from Genet Carter MD sent at 10/28/2019  4:14 PM CDT -----  Renal ultrasound - cortical scarring in the right kidney    Rec:  Recheck 1 month

## 2019-10-30 NOTE — TELEPHONE ENCOUNTER
Patient returned call, results given with recommendation. Patient will call back when ready to schedule folow up appt, patient and spouse verbally understood.

## 2019-10-30 NOTE — PROGRESS NOTES
Subjective:       Patient ID: Ayla Dela Cruz is a 74 y.o. female.    Chief Complaint: Consult (Hemorrhoids)    HPI  Pleasant 75 yo F referred to me for evaluation of bleeding hemorrhoids. Pt notes that she has been having bleeding in association with her BMs.  Notes that the bleeding occcurs with most of her BMs and is mostly on tissue paper.  She has minimal discomfort from this.  She has had a recent colonoscopy with findings of int  Hemorrhoids but no other significant findings. Pt is on eliquis for history of afib and also CVA.  She has had cholecystectomy and no other significant abdominal surgery  Review of Systems   Constitutional: Negative for activity change, appetite change, fever and unexpected weight change.   HENT: Negative for congestion and mouth sores.    Respiratory: Negative for chest tightness, shortness of breath and wheezing.    Cardiovascular: Negative for chest pain.   Gastrointestinal: Positive for anal bleeding and blood in stool. Negative for abdominal distention, abdominal pain, constipation, diarrhea, nausea, rectal pain and vomiting.   Genitourinary: Negative for difficulty urinating, dysuria and frequency.   Skin: Negative for color change and wound.   Neurological: Negative for dizziness.   Hematological: Negative for adenopathy. Does not bruise/bleed easily.   Psychiatric/Behavioral: Negative for agitation and decreased concentration.       Objective:      Physical Exam   Constitutional: She is oriented to person, place, and time. She appears well-developed and well-nourished.   HENT:   Head: Normocephalic and atraumatic.   Eyes: Pupils are equal, round, and reactive to light.   Neck: Normal range of motion. Neck supple. No tracheal deviation present. No thyromegaly present.   Cardiovascular: Normal rate, regular rhythm and normal heart sounds.   No murmur heard.  Pulmonary/Chest: Effort normal and breath sounds normal. She exhibits no tenderness.   Abdominal: Soft. Bowel  sounds are normal. She exhibits no distension, no abdominal bruit, no pulsatile midline mass and no mass. There is no hepatosplenomegaly. There is no tenderness. There is no rigidity, no rebound, no guarding, no tenderness at McBurney's point and negative Carter's sign. No hernia. Hernia confirmed negative in the ventral area.   Genitourinary: Rectum normal.   Genitourinary Comments: Ext exam demonstrates ext hemorrhoid tags in the R post and L lateral position.  OMARI with normal sphincter tone.  Anoscopy performed demonstrating moderate to large int hemorrhoids.  Most notable in R post and L lateral position   Musculoskeletal: Normal range of motion.   Neurological: She is alert and oriented to person, place, and time.   Skin: Skin is warm. No rash noted. No erythema.   Psychiatric: She has a normal mood and affect.   Vitals reviewed.        Cscope 10/10 reviewed   Assessment:     Bleeding hemorrhoids  No diagnosis found.    Plan:       D/w pt.  I have recommended increasing fiber in female diet and to also begin using a fiber supplement (metamucil or psyillium husk).  Will monitor for improvement and if no significant improvement will consider more aggressive treatment.    D/w pt  I have recommended banding at a time that is convenient to the pt.  D/w her that this will need to be done off of eliquis.  Will arrange with her cardiologist.

## 2019-11-08 ENCOUNTER — OFFICE VISIT (OUTPATIENT)
Dept: UROLOGY | Facility: CLINIC | Age: 74
End: 2019-11-08
Payer: MEDICARE

## 2019-11-08 VITALS
DIASTOLIC BLOOD PRESSURE: 90 MMHG | SYSTOLIC BLOOD PRESSURE: 156 MMHG | WEIGHT: 131.88 LBS | HEART RATE: 65 BPM | HEIGHT: 67 IN | BODY MASS INDEX: 20.7 KG/M2 | RESPIRATION RATE: 17 BRPM | TEMPERATURE: 98 F

## 2019-11-08 DIAGNOSIS — N28.0 RENAL INFARCT: ICD-10-CM

## 2019-11-08 DIAGNOSIS — R93.89 GU ORGAN IMAGING ABNORMALITY: Primary | ICD-10-CM

## 2019-11-08 LAB
BILIRUB SERPL-MCNC: ABNORMAL MG/DL
BLOOD URINE, POC: ABNORMAL
COLOR, POC UA: ABNORMAL
GLUCOSE UR QL STRIP: NEGATIVE
KETONES UR QL STRIP: NEGATIVE
LEUKOCYTE ESTERASE URINE, POC: NEGATIVE
NITRITE, POC UA: NEGATIVE
PH, POC UA: 5
PROTEIN, POC: 30
SPECIFIC GRAVITY, POC UA: 1.02
UROBILINOGEN, POC UA: NEGATIVE

## 2019-11-08 PROCEDURE — 99214 PR OFFICE/OUTPT VISIT, EST, LEVL IV, 30-39 MIN: ICD-10-PCS | Mod: S$PBB,,, | Performed by: UROLOGY

## 2019-11-08 PROCEDURE — 81002 URINALYSIS NONAUTO W/O SCOPE: CPT | Mod: PBBFAC | Performed by: UROLOGY

## 2019-11-08 PROCEDURE — 99213 OFFICE O/P EST LOW 20 MIN: CPT | Mod: PBBFAC | Performed by: UROLOGY

## 2019-11-08 PROCEDURE — 99999 PR PBB SHADOW E&M-EST. PATIENT-LVL III: CPT | Mod: PBBFAC,,, | Performed by: UROLOGY

## 2019-11-08 PROCEDURE — 99214 OFFICE O/P EST MOD 30 MIN: CPT | Mod: S$PBB,,, | Performed by: UROLOGY

## 2019-11-08 PROCEDURE — 99999 PR PBB SHADOW E&M-EST. PATIENT-LVL III: ICD-10-PCS | Mod: PBBFAC,,, | Performed by: UROLOGY

## 2019-11-08 RX ORDER — KETOROLAC TROMETHAMINE 10 MG/1
10 TABLET, FILM COATED ORAL EVERY 6 HOURS PRN
Qty: 15 TABLET | Refills: 0 | Status: SHIPPED | OUTPATIENT
Start: 2019-11-08 | End: 2020-01-23 | Stop reason: CLARIF

## 2019-11-08 RX ORDER — ONDANSETRON 4 MG/1
4 TABLET, ORALLY DISINTEGRATING ORAL ONCE
Qty: 1 TABLET | Refills: 0 | Status: SHIPPED | OUTPATIENT
Start: 2019-11-08 | End: 2019-11-08

## 2019-11-08 RX ORDER — SUCRALFATE 1 G/1
1 TABLET ORAL 4 TIMES DAILY
Qty: 40 TABLET | Refills: 2 | Status: SHIPPED | OUTPATIENT
Start: 2019-11-08 | End: 2020-01-23 | Stop reason: CLARIF

## 2019-11-08 NOTE — PROGRESS NOTES
Subjective:       Patient ID: Ayla Dela Cruz is a 74 y.o. female.    Chief Complaint:   Right renal infarct  HPI   Mrs Dela Cruz is a 74-year-old female who last month when to the emergency room at [facility] or [facility].  The patient was diagnosed of having right renal infarct she was admitted to the hospital for few days and eventually was discharged.  She was treated with IV fluids and also she was given a course of Bactrim for possible suspected urinary tract infection she did not tolerate Bactrim  satisfactory.    She is seeking my follow-up on have a lengthy discussion with her she has significant heart issues have multiple allergies she is anticoagulated.  In any stent I explained to her and her  that I a.m. the infarct cannot be reverse that this is probably clot that formed in 1 of the renal arteries on and the lack of blood flow into the kidney separately using infarct.  There is no way to prevent further reinforce that I suggest that they issues anticoagulated and she follows the cardiologist an recommendations regarding here heart care that should a minimize the risk of having a repeat event of these kind.  Also it and retain the possibility of performing a triple renal scan in order to determine how much function is in that right kidney.  The CT scan shows evidently that the right kidney is a function only partially.  Despite of these the patient had a normal serum creatinine.  At the present time the patient have no gross hematuria the urine is practically clear she is free of any other  symptoms.  Her blood pressure cuff no significant fluctuation before or after the renal infarct.    She agreed to proceed with a triple renal scan and I will inform the patient and  regarding the results of this test and we are going to follow her in as needed basis.  Review of Systems   Constitutional: Negative.  Negative for activity change.   HENT: Negative.  Negative for facial  swelling.    Eyes: Negative for discharge.   Respiratory: Positive for shortness of breath. Negative for cough.    Cardiovascular: Positive for palpitations. Negative for chest pain.   Gastrointestinal: Negative for abdominal distention, blood in stool and constipation.   Genitourinary: Negative for dysuria, flank pain, frequency, hematuria, urgency and vaginal pain.   Musculoskeletal: Negative.  Negative for arthralgias.   Skin: Negative.    Neurological: Negative.  Negative for dizziness.   Hematological: Negative for adenopathy.   Psychiatric/Behavioral: The patient is not nervous/anxious.          Objective:      Physical Exam   Constitutional: She appears well-developed.   HENT:   Head: Normocephalic.   Eyes: Pupils are equal, round, and reactive to light.   Neck: Normal range of motion.   Cardiovascular: Normal rate.    Pulmonary/Chest: Effort normal.   Abdominal: Soft. She exhibits no distension and no mass. There is no tenderness. Hernia confirmed negative in the right inguinal area and confirmed negative in the left inguinal area.   Genitourinary: No erythema, tenderness or bleeding in the vagina.   Musculoskeletal: Normal range of motion.   Neurological: She is alert.   Skin: Skin is warm.     Psychiatric: She has a normal mood and affect.       Assessment:       1.  organ imaging abnormality    2. Renal infarct      Right side renal infact  Plan:        organ imaging abnormality  -     POCT URINE DIPSTICK WITHOUT MICROSCOPE  -     NM Renogram Without Lasix; Future; Expected date: 11/08/2019    Renal infarct    Other orders  -     ketorolac (TORADOL) 10 mg tablet; Take 1 tablet (10 mg total) by mouth every 6 (six) hours as needed.  Dispense: 15 tablet; Refill: 0  -     sucralfate (CARAFATE) 1 gram tablet; Take 1 tablet (1 g total) by mouth 4 (four) times daily.  Dispense: 40 tablet; Refill: 2  -     ondansetron (ZOFRAN-ODT) 4 MG TbDL; Take 1 tablet (4 mg total) by mouth once. for 1 dose  Dispense: 1  tablet; Refill: 0     Patient to be informed of the triple renal scan results.  Follow-up in as needed basis.

## 2019-11-11 ENCOUNTER — PATIENT MESSAGE (OUTPATIENT)
Dept: UROLOGY | Facility: CLINIC | Age: 74
End: 2019-11-11

## 2019-11-20 NOTE — TELEPHONE ENCOUNTER
Spoke with patient and per her request cancelled appointment. Patient will call back when needed. Patient stated that she is doing good after procedure.   3

## 2019-11-22 RX ORDER — GABAPENTIN 300 MG/1
300 CAPSULE ORAL 3 TIMES DAILY
Qty: 90 CAPSULE | Refills: 2 | Status: SHIPPED | OUTPATIENT
Start: 2019-11-22 | End: 2020-03-16

## 2019-11-25 ENCOUNTER — HOSPITAL ENCOUNTER (OUTPATIENT)
Dept: RADIOLOGY | Facility: HOSPITAL | Age: 74
Discharge: HOME | End: 2019-11-25
Attending: UROLOGY
Payer: MEDICARE

## 2019-11-25 DIAGNOSIS — R93.89 GU ORGAN IMAGING ABNORMALITY: ICD-10-CM

## 2019-11-25 PROCEDURE — 78707 NM KIDNEY WITH FLOW AND FUNCTION: ICD-10-PCS | Mod: 26,,, | Performed by: RADIOLOGY

## 2019-11-25 PROCEDURE — 78707 K FLOW/FUNCT IMAGE W/O DRUG: CPT | Mod: 26,,, | Performed by: RADIOLOGY

## 2019-11-25 PROCEDURE — A9562 TC99M MERTIATIDE: HCPCS

## 2019-11-26 ENCOUNTER — TELEPHONE (OUTPATIENT)
Dept: UROLOGY | Facility: CLINIC | Age: 74
End: 2019-11-26

## 2019-11-26 NOTE — TELEPHONE ENCOUNTER
----- Message from Silas Davidson sent at 11/26/2019 11:48 AM CST -----  Type:  Patient Returning Call    Who Called:  Patient  Who Left Message for Patient:  NA  Does the patient know what this is regarding?:  Test results  Best Call Back Number:  [phone]  Additional Information:

## 2019-11-29 DIAGNOSIS — J45.31 MILD PERSISTENT ASTHMA WITH ACUTE EXACERBATION: ICD-10-CM

## 2019-11-29 RX ORDER — PREDNISONE 5 MG/1
10 TABLET ORAL 2 TIMES DAILY
Qty: 36 TABLET | Refills: 1 | Status: SHIPPED | OUTPATIENT
Start: 2019-11-29 | End: 2020-01-23 | Stop reason: CLARIF

## 2019-11-30 ENCOUNTER — EXTERNAL CHRONIC CARE MANAGEMENT (OUTPATIENT)
Dept: PRIMARY CARE CLINIC | Facility: CLINIC | Age: 74
End: 2019-11-30
Payer: MEDICARE

## 2019-11-30 PROCEDURE — 99490 CHRNC CARE MGMT STAFF 1ST 20: CPT | Mod: PBBFAC,PO | Performed by: FAMILY MEDICINE

## 2019-11-30 PROCEDURE — 99490 PR CHRONIC CARE MGMT, 1ST 20 MIN: ICD-10-PCS | Mod: S$PBB,,, | Performed by: FAMILY MEDICINE

## 2019-11-30 PROCEDURE — 99490 CHRNC CARE MGMT STAFF 1ST 20: CPT | Mod: S$PBB,,, | Performed by: FAMILY MEDICINE

## 2019-12-30 ENCOUNTER — HOSPITAL ENCOUNTER (EMERGENCY)
Facility: HOSPITAL | Age: 74
Discharge: HOME | End: 2019-12-30
Attending: EMERGENCY MEDICINE
Payer: MEDICARE

## 2019-12-30 ENCOUNTER — TELEPHONE (OUTPATIENT)
Dept: FAMILY MEDICINE | Facility: CLINIC | Age: 74
End: 2019-12-30

## 2019-12-30 VITALS
OXYGEN SATURATION: 98 % | WEIGHT: 131 LBS | SYSTOLIC BLOOD PRESSURE: 170 MMHG | DIASTOLIC BLOOD PRESSURE: 92 MMHG | RESPIRATION RATE: 18 BRPM | TEMPERATURE: 98 F | HEART RATE: 78 BPM | BODY MASS INDEX: 20.56 KG/M2 | HEIGHT: 67 IN

## 2019-12-30 DIAGNOSIS — L50.9 URTICARIA: Primary | ICD-10-CM

## 2019-12-30 PROCEDURE — 96372 THER/PROPH/DIAG INJ SC/IM: CPT

## 2019-12-30 PROCEDURE — 63600175 PHARM REV CODE 636 W HCPCS: Performed by: NURSE PRACTITIONER

## 2019-12-30 PROCEDURE — 99284 EMERGENCY DEPT VISIT MOD MDM: CPT | Mod: 25

## 2019-12-30 RX ORDER — DEXAMETHASONE SODIUM PHOSPHATE 4 MG/ML
8 INJECTION, SOLUTION INTRA-ARTICULAR; INTRALESIONAL; INTRAMUSCULAR; INTRAVENOUS; SOFT TISSUE
Status: COMPLETED | OUTPATIENT
Start: 2019-12-30 | End: 2019-12-30

## 2019-12-30 RX ADMIN — DEXAMETHASONE SODIUM PHOSPHATE 8 MG: 4 INJECTION, SOLUTION INTRAMUSCULAR; INTRAVENOUS at 06:12

## 2019-12-30 NOTE — TELEPHONE ENCOUNTER
----- Message from Maritza Acuna sent at 12/30/2019  1:46 PM CST -----  Contact: Patient   Type:  Same Day Appointment Request    Caller is requesting a same day appointment.  Caller declined first available appointment listed below.      Name of Caller: Pt  When is the first available appointment? 1/7/2020  Symptoms: Itching on back for 2 weeks and can't the itching  under control   Best Call Back Number: [phone]   Additional Information: Pt states she want to be seen today and can't wait until 1/7/2020. Please call and advise.

## 2019-12-30 NOTE — TELEPHONE ENCOUNTER
Spoke to patient over the phone. Pt stated her back has been itching for 2 weeks.  Pt states that she has had this once before while taking XARELTO, but has not had these symptoms since switching to ELIQUIS. Scheduled appointment for 01/09/2020 with VIOLET byrne.

## 2019-12-31 ENCOUNTER — PES CALL (OUTPATIENT)
Dept: ADMINISTRATIVE | Facility: CLINIC | Age: 74
End: 2019-12-31

## 2019-12-31 ENCOUNTER — PATIENT MESSAGE (OUTPATIENT)
Dept: CARDIOLOGY | Facility: CLINIC | Age: 74
End: 2019-12-31

## 2019-12-31 ENCOUNTER — EXTERNAL CHRONIC CARE MANAGEMENT (OUTPATIENT)
Dept: PRIMARY CARE CLINIC | Facility: CLINIC | Age: 74
End: 2019-12-31
Payer: MEDICARE

## 2019-12-31 DIAGNOSIS — I48.0 PAROXYSMAL ATRIAL FIBRILLATION: Primary | ICD-10-CM

## 2019-12-31 PROCEDURE — 99490 PR CHRONIC CARE MGMT, 1ST 20 MIN: ICD-10-PCS | Mod: S$PBB,,, | Performed by: FAMILY MEDICINE

## 2019-12-31 PROCEDURE — 99490 CHRNC CARE MGMT STAFF 1ST 20: CPT | Mod: PBBFAC,PO | Performed by: FAMILY MEDICINE

## 2019-12-31 PROCEDURE — 99490 CHRNC CARE MGMT STAFF 1ST 20: CPT | Mod: S$PBB,,, | Performed by: FAMILY MEDICINE

## 2019-12-31 RX ORDER — DABIGATRAN ETEXILATE 150 MG/1
150 CAPSULE ORAL 2 TIMES DAILY
Qty: 180 CAPSULE | Refills: 2 | Status: SHIPPED | OUTPATIENT
Start: 2019-12-31 | End: 2020-01-10

## 2019-12-31 NOTE — ED PROVIDER NOTES
Encounter Date: 12/30/2019       History     Chief Complaint   Patient presents with    Rash     x 5 weeks      12/30/2019  6:37 PM     Chief Complaint:     The patient is a 74 y.o. female who presents with c/o rash with itching to lower back x 5 weeks. Reports she had similar symptoms approximately 5 months ago which were attributed to her xarelto. States the symptoms disappeared after stopping xarelto and starting eliquis 5 months ago. Denies other symptoms.           Review of patient's allergies indicates:   Allergen Reactions    Bactrim [sulfamethoxazole-trimethoprim] Other (See Comments)     Upset stomach, dry heaves, confusion    Atorvastatin      Other reaction(s): Joint pain    Augmentin [amoxicillin-pot clavulanate]      Other reaction(s): Mental Status Change    Baclofen (bulk) Nausea And Vomiting    Ciprofloxacin (bulk)     Decongest tabs      Other reaction(s): increased heart rate    Erythromycin Other (See Comments)    Flecainide Hives     And SOB. No reaction to Lidocaine     Fluoxetine      Other reaction(s): heart palpitations  Other reaction(s): anxiety    Lisinopril Other (See Comments)     cough    Losartan Other (See Comments)     Hypotension with lightheadedness    Morphine Other (See Comments)     confusion    Tramadol Other (See Comments)     SOB, low BP    Venlafaxine analogues      Changes in BP and increases heart rate       Afrin [oxymetazoline] Palpitations    Caffeine Palpitations    Tizanidine Anxiety     dizziness     Past Medical History:   Diagnosis Date    Anticoagulant long-term use     Anxiety     Arthritis     Atrial fibrillation     Cancer     skin    CHF (congestive heart failure)     Depression     DVT (deep venous thrombosis)     GERD (gastroesophageal reflux disease)     Glaucoma (increased eye pressure)     Hyperlipidemia     diet controlled    Hypertension     Interstitial lung disease     Mild persistent asthma without complication  11/12/2018    Pneumonia 1/31/2014    Stroke 6-3-14    Stroke     TIA (transient ischemic attack)     TIA (transient ischemic attack)      Past Surgical History:   Procedure Laterality Date    2 heart ablations      3 in total    bilateral cataracts      CHOLECYSTECTOMY      COLONOSCOPY N/A 1/19/2017    Procedure: COLONOSCOPY and EGD;  Surgeon: Jonathan Schultz MD;  Location: [facility] ENDO;  Service: Endoscopy;  Laterality: N/A;    COLONOSCOPY N/A 10/10/2019    Procedure: COLONOSCOPY;  Surgeon: Alex Christian MD;  Location: [facility] ENDO;  Service: Endoscopy;  Laterality: N/A;    ESOPHAGOGASTRODUODENOSCOPY N/A 10/14/2019    Procedure: EGD (ESOPHAGOGASTRODUODENOSCOPY);  Surgeon: Alex Christian MD;  Location: [facility] ENDO;  Service: Endoscopy;  Laterality: N/A;    INJECTION OF ANESTHETIC AGENT AROUND MEDIAL BRANCH NERVES INNERVATING CERVICAL FACET JOINT Right 6/7/2018    Procedure: BLOCK, NERVE, FACET JOINT, MEDIAL BRANCH, CERVICAL;  Surgeon: Galo Clancy MD;  Location[facility] OR;  Service: Pain Management;  Laterality: Right;  C4, 5, 6    OPEN REDUCTION AND INTERNAL FIXATION (ORIF) OF INJURY OF ANKLE Right 11/1/2018    Procedure: ORIF, ANKLE;  Surgeon: Wilfredo Aguero MD;  Location: [facility] OR;  Service: Orthopedics;  Laterality: Right;    pyloristenosis      RADIOFREQUENCY ABLATION OF LUMBAR MEDIAL BRANCH NERVE AT SINGLE LEVEL Bilateral 9/21/2018    Procedure: RADIOFREQUENCY ABLATION, NERVE, SPINAL, LUMBAR, MEDIAL BRANCH, 1 LEVEL;  Surgeon: Galo Clancy MD;  Location: [facility] OR;  Service: Pain Management;  Laterality: Bilateral;  L3, 4, 5    RADIOFREQUENCY ABLATION OF LUMBAR MEDIAL BRANCH NERVE AT SINGLE LEVEL Bilateral 2/19/2019    Procedure: Radiofrequency Ablation, Nerve, Spinal, Lumbar, Medial Branch, 1 Level;  Surgeon: Galo Clancy MD;  Location: [facility] OR;  Service: Pain Management;  Laterality: Bilateral;  L3, 4, 5     RADIOFREQUENCY THERMAL COAGULATION OF MEDIAL BRANCH OF POSTERIOR RAMUS OF CERVICAL SPINAL  NERVE Right 7/3/2018    Procedure: RADIOFREQUENCY THERMAL COAGULATION, NERVE, SPINAL, CERVICAL, MEDIAL BRANCH OF POSTERIOR RAMUS;  Surgeon: Galo Clancy MD;  Location: [facility] OR;  Service: Pain Management;  Laterality: Right;  C4,5,6 - Burned at 80 degrees C. for 75 seconds each site    RADIOFREQUENCY THERMAL COAGULATION OF MEDIAL BRANCH OF POSTERIOR RAMUS OF CERVICAL SPINAL NERVE Right 7/23/2019    Procedure: RADIOFREQUENCY THERMAL COAGULATION, NERVE, SPINAL, CERVICAL, POSTERIOR RAMUS, MEDIAL BRANCH;  Surgeon: Galo Clancy MD;  Location: [facility] OR;  Service: Pain Management;  Laterality: Right;  C4,5,6    RADIOFREQUENCY THERMOCOAGULATION Bilateral 9/10/2019    Procedure: RADIOFREQUENCY THERMAL COAGULATION LUMBAR;  Surgeon: Galo Clancy MD;  Location: [facility] OR;  Service: Pain Management;  Laterality: Bilateral;  L3,4,5 - Burned at 80 degrees C. for 60 seconds x 2 each site    skin cancer removal       TONSILLECTOMY       Family History   Problem Relation Age of Onset    Heart disease Father     Ulcers Father     Arthritis Mother     Asthma Mother     Rheum arthritis Mother     Pneumonia Mother     Depression Son     Alzheimer's disease Maternal Uncle     Rheum arthritis Maternal Grandmother     Emphysema Maternal Grandfather     Cancer Maternal Grandfather         kidney    Kidney disease Maternal Grandfather     Cancer Paternal Grandmother         lung= smoker    Pneumonia Paternal Grandfather     Breast cancer Neg Hx     Ovarian cancer Neg Hx      Social History     Tobacco Use    Smoking status: Never Smoker    Smokeless tobacco: Never Used   Substance Use Topics    Alcohol use: No     Frequency: Monthly or less     Drinks per session: 1 or 2     Binge frequency: Never    Drug use: No     Review of Systems   Constitutional: Negative for chills and fever.   HENT: Negative for facial swelling and trouble swallowing.    Eyes: Negative for discharge.   Respiratory: Negative for cough, chest tightness,  shortness of breath and wheezing.    Cardiovascular: Negative for chest pain and palpitations.   Gastrointestinal: Negative for abdominal pain, diarrhea, nausea and vomiting.   Genitourinary: Negative for dysuria and hematuria.   Musculoskeletal: Negative for arthralgias, back pain, joint swelling, myalgias, neck pain and neck stiffness.   Skin: Positive for rash. Negative for color change, pallor and wound.   Neurological: Negative for dizziness, syncope, weakness, light-headedness, numbness and headaches.   Hematological: Does not bruise/bleed easily.   Psychiatric/Behavioral: The patient is not nervous/anxious.        Physical Exam     Initial Vitals [12/30/19 1806]   BP Pulse Resp Temp SpO2   (!) 170/92 78 18 97.9 °F (36.6 °C) 98 %      MAP       --         Physical Exam    Nursing note and vitals reviewed.  Constitutional: She appears well-developed and well-nourished. She is not diaphoretic. No distress.   HENT:   Head: Normocephalic and atraumatic.   Right Ear: External ear normal.   Left Ear: External ear normal.   Nose: Nose normal.   Mouth/Throat: Oropharynx is clear and moist.   Eyes: Conjunctivae and EOM are normal. Pupils are equal, round, and reactive to light.   Neck: Normal range of motion. Neck supple.   Cardiovascular: Normal rate, regular rhythm, normal heart sounds and intact distal pulses. Exam reveals no gallop and no friction rub.    No murmur heard.  Pulmonary/Chest: Breath sounds normal. No respiratory distress. She has no wheezes. She has no rhonchi. She has no rales.   Abdominal: Soft. She exhibits no distension and no mass. There is no tenderness.   Musculoskeletal: Normal range of motion. She exhibits no edema or tenderness.   Lymphadenopathy:     She has no cervical adenopathy.   Neurological: She is alert and oriented to person, place, and time. She has normal strength. No cranial nerve deficit or sensory deficit.   Skin: Skin is warm and dry. Rash noted. No abscess noted. There is  erythema.   Urticaria rash to samantha lower back, blanchable    Psychiatric: She has a normal mood and affect.         ED Course   Procedures  Labs Reviewed - No data to display       Imaging Results    None          Medical Decision Making:   History:   Old Medical Records: I decided to obtain old medical records.  Differential Diagnosis:   Atopic dermatitis  Urticaria   Shingles         APC / Resident Notes:   Based on history and physical exam, I believe pt has urticaria likely as a result of medication.  Pt has blanchable urticaria rash to samantha lower back. No signs of cellulitis. It is likely she is having a reaction to eliquis as she had same reaction to xarelto 5 months ago. I explained to pt to continue taking eliquis and f/u with cardiology about possibly changing medication as well as an allergist. Pt was given a decadron IM shot in ED to help alleviate symptoms. Pt has no systemic symptoms. I do not feel further emergent evaluation or treatment is needed and pt is stable for discharge. I have discussed pt with Dr Coburn who agrees with poc. Pt voices understanding and is agreeable to the plan.  She is given specific return precautions.                               Clinical Impression:       ICD-10-CM ICD-9-CM   1. Urticaria L50.9 708.9         Disposition:   Disposition: Discharged  Condition: Stable                     Mery Stanley NP  12/30/19 1954

## 2019-12-31 NOTE — DISCHARGE INSTRUCTIONS
Follow up with cardiology and dermatology and/or allergist as soon as possible. Return to ED for new or worsening symptoms.

## 2020-01-10 ENCOUNTER — OFFICE VISIT (OUTPATIENT)
Dept: CARDIOLOGY | Facility: CLINIC | Age: 75
End: 2020-01-10
Payer: MEDICARE

## 2020-01-10 VITALS
WEIGHT: 130.06 LBS | HEIGHT: 67 IN | OXYGEN SATURATION: 99 % | HEART RATE: 54 BPM | DIASTOLIC BLOOD PRESSURE: 73 MMHG | BODY MASS INDEX: 20.41 KG/M2 | SYSTOLIC BLOOD PRESSURE: 154 MMHG

## 2020-01-10 DIAGNOSIS — D50.0 IRON DEFICIENCY ANEMIA DUE TO CHRONIC BLOOD LOSS: ICD-10-CM

## 2020-01-10 DIAGNOSIS — L50.9 LOCALIZED HIVES: ICD-10-CM

## 2020-01-10 DIAGNOSIS — G45.9 TIA (TRANSIENT ISCHEMIC ATTACK): ICD-10-CM

## 2020-01-10 DIAGNOSIS — N28.0 RENAL INFARCT: ICD-10-CM

## 2020-01-10 DIAGNOSIS — I48.0 PAF (PAROXYSMAL ATRIAL FIBRILLATION): Primary | ICD-10-CM

## 2020-01-10 DIAGNOSIS — I48.0 PAF (PAROXYSMAL ATRIAL FIBRILLATION): ICD-10-CM

## 2020-01-10 DIAGNOSIS — I48.0 PAROXYSMAL ATRIAL FIBRILLATION: Primary | ICD-10-CM

## 2020-01-10 DIAGNOSIS — Z78.9 MEDICATION INTOLERANCE: ICD-10-CM

## 2020-01-10 DIAGNOSIS — R74.01 ELEVATED AST (SGOT): ICD-10-CM

## 2020-01-10 DIAGNOSIS — Z79.01 ANTICOAGULANT LONG-TERM USE: ICD-10-CM

## 2020-01-10 DIAGNOSIS — Z86.79 S/P ABLATION OF ATRIAL FLUTTER: ICD-10-CM

## 2020-01-10 DIAGNOSIS — Z98.890 S/P ABLATION OF ATRIAL FLUTTER: ICD-10-CM

## 2020-01-10 PROCEDURE — 93005 ELECTROCARDIOGRAM TRACING: CPT | Mod: PBBFAC,PO | Performed by: INTERNAL MEDICINE

## 2020-01-10 PROCEDURE — 1126F AMNT PAIN NOTED NONE PRSNT: CPT | Mod: S$GLB,,, | Performed by: INTERNAL MEDICINE

## 2020-01-10 PROCEDURE — 1159F PR MEDICATION LIST DOCUMENTED IN MEDICAL RECORD: ICD-10-PCS | Mod: S$GLB,,, | Performed by: INTERNAL MEDICINE

## 2020-01-10 PROCEDURE — 99999 PR PBB SHADOW E&M-EST. PATIENT-LVL V: ICD-10-PCS | Mod: PBBFAC,,, | Performed by: INTERNAL MEDICINE

## 2020-01-10 PROCEDURE — 1126F PR PAIN SEVERITY QUANTIFIED, NO PAIN PRESENT: ICD-10-PCS | Mod: S$GLB,,, | Performed by: INTERNAL MEDICINE

## 2020-01-10 PROCEDURE — 1159F MED LIST DOCD IN RCRD: CPT | Mod: S$GLB,,, | Performed by: INTERNAL MEDICINE

## 2020-01-10 PROCEDURE — 99215 PR OFFICE/OUTPT VISIT, EST, LEVL V, 40-54 MIN: ICD-10-PCS | Mod: 25,S$GLB,, | Performed by: INTERNAL MEDICINE

## 2020-01-10 PROCEDURE — 99215 OFFICE O/P EST HI 40 MIN: CPT | Mod: 25,S$GLB,, | Performed by: INTERNAL MEDICINE

## 2020-01-10 PROCEDURE — 99215 OFFICE O/P EST HI 40 MIN: CPT | Mod: PBBFAC,PO | Performed by: INTERNAL MEDICINE

## 2020-01-10 PROCEDURE — 99999 PR PBB SHADOW E&M-EST. PATIENT-LVL V: CPT | Mod: PBBFAC,,, | Performed by: INTERNAL MEDICINE

## 2020-01-10 PROCEDURE — 93000 ELECTROCARDIOGRAM COMPLETE: CPT | Mod: S$GLB,,, | Performed by: INTERNAL MEDICINE

## 2020-01-10 PROCEDURE — 93000 EKG 12-LEAD: ICD-10-PCS | Mod: S$GLB,,, | Performed by: INTERNAL MEDICINE

## 2020-01-10 RX ORDER — PREDNISONE 20 MG/1
20 TABLET ORAL DAILY
Qty: 30 TABLET | Refills: 3 | Status: SHIPPED | OUTPATIENT
Start: 2020-01-10 | End: 2020-01-23 | Stop reason: CLARIF

## 2020-01-10 NOTE — PROGRESS NOTES
Subjective:    Patient ID:  Ayla Dela Cruz is a 74 y.o. 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  PCP: Dr. Olivo, see annually, do labs  Orthopedic: Dr. Aguero  Surgeon: Dr. Gonsalez, and Dr. Dc  Rheumatologist: Dr. Pak  Prior cardiologist: Dr. Gibson, last seen over a year  Pulmonary: Dr. White  Psychologist: Nu Fermin, AGUILAR, in [place]  Gyn: Dr. Jordan  GI: Dr. Schultz  Neurologist: Dr. Ramirez  Dermatologist: Dennis Corrigan  Pain: Dr. Clancy, injections to neck and both hips very helpful  Lives with , Jan, here with patient, non-smoker, history of prostate CA,  52 years on 6/24  daughter, Lyric, source of stress  Restarted , now 2-4 times weekly, still enjoys it, playing the flute    Health literacy: high  Activities: house work, some walking, do not feel limited  Nicotine: never  Alcohol: rarely wine  Illicit drugs: no  Cardiac symptoms: none  Home BP: 123/70  Medication compliance: yes  Diet: regular  Caffeine: no  Labs: 1/2019 LDL 90.6 on Crestor 40 mg, normal TSH, CMP (albumin 3.1), normal CBC, Vit. D  10/2019 AST 95, Hgb 11.4  Last Echo: 09/07/17 FELICIA  Last stress test: 07/30/14, Lexiscan  Cardiovascular angiogram: none  ECG: NSR, rate 89, left axis, QTc 508 msec  Fundoscopic exam: biannually, no retinopathy, being treated for glaucoma.    In 6/2014:  Hospitalized 6/3/2014 for acute right sided weakness and slurred speech  DCS - Ayla Dela Cruz is a 69 y.o. female admitted to the services of hospital medicine via the ER for acute CVA. Presented with difficulty speaking, facial drooping and weakness of the right upper and lower extremities. She missed the time window for tPA. ST/PT/OT as well as cardiology and neurology were consulted. Dr. Jurado of  cardiology managed A fib. Patient rapidly improved functioning with PT/OT/ST. Currently her goals with PT are met as she is ambulating over 400 feet independently.  She was not approved for IP rehab and refuses SNF. She will be discharged home with outpatient PT/ST/OT evaluation and treatment.    Neurocardio work up  CT head - Mild involutional changes and findings suggesting mild microvascular ischemic change with no acute intracranial findings    Carotid US - No hemodynamically significant stenosis is noted by velocity criteria involving either internal carotid artery.  A hemodynamically significant stenosis is defined as a stenosis of greater than 50% of the internal carotid artery vessel lumen    ECHO, 6/4/2014, CONCLUSIONS     1 - Concentric hypertrophy. Wall thickness is mildly increased, with the septum and the posterior wall each measuring 1.1 cm across.    2 - Normal left ventricular systolic function (EF 60-65%).     3 - Mild mitral regurgitation.     4 - Left ventricular diastolic dysfunction.     5 - Pulmonary hypertension. estimated PA systolic pressure is 64 mmHg.    6 - Normal right ventricular systolic function .     7 - Suggestion for increase in LV mass from echo on 3/18/2014..     Echo bubble study also negative. Had episode of PAF during monitoring and convert with restart of Flecainide.   Since DC, improving strength in the right hand, right leg feels normal but tire easier. Speech improving. To receive PT/OT/speech today.  Medications reviewed - will DC ASA for now, and know the effects of the Limbitrol and Ativan, uses prn rarely. Some loss of appetite and taste.    Active problems in hospital  Acute left hemispheric CVA, MRI suggest multiple areas, was not on OAC nor antiplatelet Rx  PAF with high CHADS-VAS score, post ablations and DCCs  HTN with LVH  Interstitial lung disease  Pulmonary hypertension  Hyperlipidemia was not on statin    Since visit of 6/20, problem with peripheral swelling,  now taking Lasix daily. Last hospitalization / CMP, 6/3 showed K+ 3.4 with normal renal function. Also noted AVILA along with exertional chest heaviness, on-and-off over past several years. Noted it with walking and have to rest. Can last up to an hour. Max grade 10/10, yesterday during the day.  in hospital. Also quite anxious, stopped Limbitrol due to side effects, managed by Dr. Olivo. Quite sedentary and major decrease in appetite, due to depression. No Psychiatrist. Home BP high 175/97. ECG shows NSR, rate 61, right axis, nonspecific T waves abnormalities, prolong QTc 483 msec. Low anterior forces.    Holter, 6/30/2014:  PREDOMINANT RHYTHM  1. Sinus rhythm with heart rates varying between 43 and 67 bpm with an average of 55 bpm.     VENTRICULAR ARRHYTHMIAS  1. There were frequent polymorphic PVCs totalling 1388 and averaging 40 per hour.  There was 1 couplet.    2. There were no episodes of ventricular tachycardia.    SUPRA VENTRICULAR ARRHYTHMIAS  1. There were very rare PACs totalling 47 and averaging 1 per hour.     2. There were no episodes of sustained supraventricular tachycardia.    SINUS NODE FUNCTION  1. There was no evidence of high grade SA khai block.     AV CONDUCTION  1. There was no evidence of high grade AV block.     DIARY  1. The diary was returned, but not completed    MISCELLANEOUS  1. This was a tape of adequate length (34 hrs).     Since visit of 7/24, better, reviewed by Dr. Olivo and started antidepressant Lexapro. BMP still showed mild hypokalemia of 3.3, not using Lasix at this time. Still feeling fatigue, anxiety, and request refill for Nexium. Discussed need for GI review on chronic PPI. Lack of appetite.  Lexiscan, 7/30/2014, - Nuclear Quantitative Functional Analysis:   LVEF: 66 % (normal is 55 - 69)  LVED Volume: 50 ml (normal is 60 - 98)  LVES Volume: 17 ml (normal is 20 - 42)    Impression: NORMAL MYOCARDIAL PERFUSION  1. The perfusion scan is free of evidence for  "myocardial ischemia or injury.   2. There is a mild intensity fixed defect in the anteroseptal wall of the left ventricle, secondary to breast attenuation.   3. Resting wall motion is physiologic.   4. Resting LV function is normal.  (normal is 55 - 69)  5. The ventricular volumes are normal at rest and stress.   6. The extracardiac distribution of radioactivity is normal.     No problem with spironolactone, BP improved, home BP similar to office readings.    Since visit of 8/14, had some distress and home monitor showed HR of 40, felt "bad" and nervous to ED, note reviewed, ECG and final pulse count 57-58. Labs reviewed - normal renal function and K+ 3.8. Still taking one tab of K+ daily. Not using Lasix. Explained HR discrepancy may be due to VPCs. Reviewed by Ms. Fermin and Lexapro increased to 20 mg, 2 days ago. Feeling "a lot better". Sleeping better. Still sedentary but ready to be more active. Eating better have lost additional 5 lbs since 8/2014.    Since visit of 9/5, still with speech therapy, noted progressive near syncope with SOB and irregular heart beats. Also noted some ankle swelling over the past 2 weeks. ECG shows marked bradycardia, rate 48, right axis, pulmonary pattern, 1st degree AVB. Wellbutrin 100 mg daily along with Lexapro 20 mg by Ms. Fermin NP. BMP showed K+3.5. To use Lasix PRN    Since visit of 9/25, still with marked AVILA, only able to walk about 30' before having to stop. Bothered by increase palpitations during tapering of BB. No definite lung problem, evaluated this year. ECG shows sinus dewayne, rate 53, VPC, RBBB with suggestion of septal MI. No evidence for anemia - CBC 9/3/2014 was normal. Just started doing some activities with arm and leg exercise for the last 2 weeks, Doing some OT alternating with PT every other day.  CXR, 6/3/2014 - Lungs are clear of infiltrate and costophrenic sulci are sharp.  The cardiomediastinal silhouette appears stable.    PET scan, 4/2014 - 1.A " "hypermetabolic left pulmonary mass is noted with an SUV of 3.0 maximally.  A neoplastic, infectious, or granulomatous process is on the differential. Clinical correlation is suggested.  Close follow-up for resolution or biopsy would be suggested    2.  Elevated right-sided heart pressures are suspected with a large right atrium    3.  Right-sided pleural effusion    4.  Hypermetabolic thyroid gland asymmetrically on the right.  This may relate to thyroiditis, Graves' disease, or neoplastic process.  Ultrasound may be helpful.    5.  Small hypermetabolic focus in the expected location of the left ovary, a female pelvis ultrasound may be helpful for further evaluation.  This could relate to a uterine fibroid that has necrosed or infarcted    Biopsy of lung nodule on 5/1/2014 - negative for CA    CTA, 4/2014 - No evidence of pulmonary thromboembolism.    2.  Enlarged cardiac silhouette and secondary findings of elevated right heart pressures with diffuse mosaic lung pattern and right basilar pleural fluid suggesting cardiac decompensation/heart failure.    3. Unchanged 1 cm nodular density within the left upper lobe which previously demonstrated hypermetabolism by PET/CT and unchanged mediastinal lymphadenopathy.    4.  Subpleural nodular density within the right upper lobe is unchanged when compared with the previous study and could represent an area of remote fibrotic scarring.    5.  Heterogeneous appearance of the spleen, possibly due to the phase of contrast enhancement.  Evolving splenic infarction is not entirely excluded.    6. Suggestion of colonic wall thickening involving the descending colon.  Please correlate for symptoms of colitis.    Since visit of 10/14, rehospitalized for HF on 10/26 to 10/29/2014.  DCS - "Ayla Dela Cruz is a 69 y.o. female admitted to the services of hospital medicine via the ER for acute diastolic heart failure. C/o severe SOB and increase in leg swelling over the past two days. " "Under the care of Dr. Jurado. Recently was taken off metoprolol. History of paroxsymal atrial fib. Hospitalized here in June in this year for acute CVA. It was at that time, pt started Xarelto. Deficits has resolved. No history of heart failure.     Hospital Course: The patient was admitted with acute CHF biventricular and mild elevation of her troponin's at 0.029 - 0.035 and therefore an anginal equivalent was suspected. The patient also had significant hypertension upon admission and although she was not in atrial fibrillation, her EKG showed a significant first degree AV block and RBBB. Discussion was made as to whether or not to decrease the patient flecainide; however due to the risk of her going back into atrial fibrillation this was not done. Upon discharge the patient's lisinopril was increased to 10 mg and aldactone was added."    RHC done HEMODYNAMIC RESULTS:    LVEDP (Pre): 24 mmHg  BP: 166/104    Air rest:  PA: 90/34 (54)  PW:  (24)  RVEDP: 16  RA:  (16)    C. SUMMARY:    1. Diastolic dysfunction.  2. - Kee CO 2.64 L/min  3. - Mean systemic pressure 108.6 mmHG, stage II HTN  4. - SVR 41.16 Wood Units  5. - PVR 11.36 Wood Units  6. - Pulmonary HTN due to left sided heart disease and stage II HTN    D. RECOMMENDATIONS:    1. Routine post-cath care.  2. Cardiac rehab referral.  3. Follow up with Dr. Barrett Jurado.  4. - Aggressive BP lowering and diuresis    Repeat ECHO 11/5/2014 CONCLUSIONS     1 - Concentric remodeling. Septal wall thickness is increased, and posterior wall thickness is mildly increased, with the septum measuring 1.3 cm and the posterior wall measuring 1.1 cm across.    2 - Mildly to moderately depressed left ventricular systolic function (EF 40-45%).     3 - Left ventricular diastolic dysfunction. E/e'(lat) is 16    4 - Right ventricular enlargement with mildly depressed systolic function.     5 - Pulmonary hypertension. estimated PA systolic pressure is 56 mmHg.     6 - Intermediate central " "venous pressure.     7 - No evidence of intracardiac shunt.     8 - No significant change from Echo of 6/4/2014.    Active problems:  Progressive severe SOB, functional class IV  Diastolic dysfunction, elevated BNP and Troponin  Hypokalemia due to diuretics  Secondary Pulmonary HTN with peripheral edema  HTN  History of CVA 6/3/2014  PAF controlled with Flecanide  Marked bradycardia with BB  On OAC  Hematuria    At home receiving PT, OT and speech Rx twice a week, with  nurse once a week, no problem with medications. Feeling better, sleeping well. Home /73.    Since visit of 11/14, feeling "much better", concern of occasional HTN, home BP /66-99, HR . Lot more active, no problem. Not using walker, no CP, SOB, nor dizziness. Recent BMP - normal renal function and K+ 4.1.    Since visit of 12/19/2014, worry about HTN home readings similar to office up to . Morning reading is higher. No HA nor visual abnormalities. Xanax has helped but afraid to use too much. ECG shows sinus arrhythmia, rate of 65, late transition and LAFB. Also bothered with dry cough with the Lisinopril. And started to have return of arm pains that was attributed to atorvastatin, on Crestor. Discussed options.     Since visit of 1/16/2015, noted frequent nocturia, 8-10 times, taking losartan-HCT at night time. Have stopped using Lasix and spironolactone for past 2 months. More active without much problem. Labs normal renal function, K+ 4.2, BNP now 37, A1C 5.6%.    Since visit of 2/18/2015, improving, no further dizziness, some SOB when "stressed", usually helped with alprazolam, use only prn, about 3-6 times weekly. Compliant with medications. Been off Crestor for 6 weeks with concern of muscle problem. Home BP is fine, similar to office.     Since visit of 4/15, appetite returned, feeling a lot better. Active, walking twice a week for about half an hour. Also do calisthenic about twice a week, no problem. Seeing Dr. Zavaleta " "for generalized pruritis for past 3 months. Cardiac wise less AVILA. Sleeping well. Labs in 5/2015, normal CBC without eosinophilia, thyroid normal, ferritin level low normal at 21. Off Crestor for couple months due to fear of muscle pain but did not find much difference being off medication. Last Lipid in 11/2014 was good, on daily dose of Crestor. Home /79.    Since visit of 7/2/2015, feeling "great", very active without problem, no more AVILA nor dizziness with standing. Compliant with medications, don't miss. Using Tylenol 500 mg BID for arthritis. Notice easy bruising on Xarelto. Home /10.  Holter - PREDOMINANT RHYTHM  1. Sinus arrhythmia with heart rates varying between 46 and 110 bpm with an average of 73 bpm.     VENTRICULAR ARRHYTHMIAS  1. There were very rare PVCs recorded totalling 8 and averaging less than 1 per hour.     2. There were no episodes of ventricular tachycardia.    SUPRA VENTRICULAR ARRHYTHMIAS  1. There were very frequent PACs totalling 3054 and averaging 132 per hour.  There were 29 bigeminal cycles.  There were 103 couplets.    2. 3% of complexes.    3. There were no episodes of sustained supraventricular tachycardia.    SINUS NODE FUNCTION  1. There was no evidence of high grade SA khai block.     AV CONDUCTION  1. There was no evidence of high grade AV block.     2. The longest RR interval was 1565 msec.     DIARY  1. The diary was properly completed.     2. There was 1 episode of skipping beats reported. The corresponding rhythm strips revealed the following:             During event 1 the rhythm was sinus rhythm at 75 bpm.     3. There was 1 episode of skipped beat reported. The corresponding rhythm strips revealed the following:             During event 1 the rhythm was sinus arrhythmia at 63 bpm.     MISCELLANEOUS  1. This was a tape of adequate length (23 hrs).     Since visit of 10/15, place on new antidepressant, Venlafaxine 75 mg daily, tried 2 and developed rapid HR to " "125 bpm, feels more anxious, now switched to Citalopram. Also noted the daily dose of Lorazepam does not seem adequate. Orthostatic VS is positive with lying 142/73, , standing 120/62, . Been taking spironolactone 25 mg for last 3 days. Does feel thirsty. Labs reviewed A1C 5.8%, CBC, BMP were WNL. Lipid shows LDL 99, non-. Goal preferred is <70 and < 100. No problem with 10 mg of Crestor. Had vacation at [place] [employer] [place], walked all over without problem.    Since visit of 1/18/2016, no new problem. 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 Labs with , non-, hsCRP at 2.56.     In 10/2016, had acute GB attack with rupture in 8/2016, then tried on Wellbutrin took only 1-2 tabs and BP up to 201/100 with head tightness. Subsequently back to normal, the last 2.5 days took Losartan bid. Discussed Rx options for HTN. Advised to be more active, regular exercise. Lab reviewed LDL in 8/2016 93.     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 Still with daily palpitations, last up to half hours. Have electronic watch, David Barroso, since 9/2016, suppose harmonize patient with the universe. Feeling better if on during the day. Lot of walking at school, no problem.   Holter in 10/2016 - PREDOMINANT RHYTHM  1. Sinus rhythm with heart rates varying between 52 and 144 bpm with an average of 75 bpm.     2. Paroxysmal atrial fibrillation    VENTRICULAR ARRHYTHMIAS  1. There were occasional mostly monomorphic PVCs totalling 596 and averaging 13 per hour.     2. There were no episodes of ventricular tachycardia.    SUPRA VENTRICULAR ARRHYTHMIAS  1. There were frequent PACs totalling 2982 and averaging 69 per hour.  There were 69 bigeminal cycles.  There were 55 couplets.    2. There were no episodes of sustained supraventricular tachycardia.    3. Paroxysmal atrial fibrillation was noted in the the study.     SINUS NODE FUNCTION  1. There was no evidence of high grade SA " khai block.     AV CONDUCTION  1. There was no evidence of high grade AV khai filtering.     2. The longest RR interval was 1725 msec.     DIARY  1. The diary was returned, but not completed    MISCELLANEOUS  1. This was a tape of adequate length (43 hrs).     ECHO CONCLUSIONS     1 - Hyperdynamic left ventricular systolic function (EF 65-70%).     2 - Normal left ventricular diastolic function.     3 - Normal right ventricular systolic function .     4 - Pulmonary hypertension. The estimated PA systolic pressure is 64 mmHg.     5 - Less evidence for LVH from Echo in 11/2014.     In 5/2017, bothered by recurrent PAF with AVILA after 10 feet walking. Felt better before on Flecainide 100 mg bid, but Holter continued to show PAF. Attempt up titration, problem with itching, switched to propafenone 150 mg tid, continued with itching and reviewed by allergist, treated with 10 days of cortisone with benefit. No hive and no itching, just don't feel good due to the irregular rhythm. History reviewed, had 2 prior AF ablation, last in [place], [place] in around 2000, recall being told not to do again. Recall seeing an [facility] EP doc but didn't like him. Discussed various options. Will stop antiarrhythmic for now, will be going on a 16 days trip to [place]. Reviewed prior medications, have taken Tenormin, metoprolol tartrate, and short-acting diltiazem in the past without problem. Refer to Dr. Calderon for review. ECG in AF, rate 99, PRWP, LAFB    In 11/2017, post AF ablation, felt good for a few weeks, noted some SOB and had review with Dr. Calderon on 11/1 - 72 year old female with a longstanding history of atrial arrhythmias. Her [id number] Score is 5 (age, female, TIA, HTN) so she should remain on anticoagulation indefinitely. She has failed Flecainide. She is now 6 weeks post-PVI and MA flutter line, and maintaining sinus rhythm on low-dose Amiodarone. Given her intermittent AVILA which started post-ablation, I have stopped  "Amiodarone which I think is the likely culprit. I instructed her to continue taking Lasix PRN, as well as metoprolol and Xarelto." ECG on 11/1 was in NSR, rate 61, PRWP.  Recommended to stop amiodarone but then started to feel the palpitation daily, felt nervous and at the same time being taper down on the nightly Ativan. Did have some breakthrough anxiety attacks. Also had strained the upper back and right arm / shoulder, requesting some Tramadol, tried Jan's Tramadol with good relief. Understand this is an opoid. Used Excedrin with caffeine and bothered by more palpitations.    In 3/2018, here for recurrent palpitations, no inciting factors over the past 6 weeks. Had review with Dr. Calderon in 2/2018 - "72 year old female with a longstanding history of atrial arrhythmias and prior ablations at outside institutions. Her [id number] Score is 5 (age, female, TIA, HTN) so she should remain on anticoagulation indefinitely. She is now 5 months post-PVI and MA flutter line, and maintaining sinus rhythm off Amiodarone. The plan is to continue Xarelto, and to see me again in 6 months." Palpitations appears associated with AVILA, had difficulties walking in house or up a ramp. Started 100 mg of amiodarone last week, daily, feels some benefit. ECG today in Sinus rhythm vs wandering atrial pacemaker with frequent PJC, rate 59. Also taking Losartan in the morning appears more effective.  Holter 11/2017 - PREDOMINANT RHYTHM  1. Sinus arrhythmia with heart rates varying between 39 and 102 bpm with an average of 58 bpm.     VENTRICULAR ARRHYTHMIAS  1. There were occasional PVCs totalling 728 and averaging 15 per hour.  There were 5 bigeminal cycles.     2. There were no episodes of ventricular tachycardia.    SUPRA VENTRICULAR ARRHYTHMIAS  1. There was no supraventricular ectopic activity.    SINUS NODE FUNCTION  1. There was no evidence of high grade SA khai block.     AV CONDUCTION  1. There was no evidence of high grade AV " "block.     2. The longest RR interval was 1820 msec.     DIARY  1. The diary was not returned    MISCELLANEOUS  1. There were occasional hookup related artifacts. Overall, the study was of good quality.     2. This was a tape of adequate length (48 hrs).     in 5/2018, no new problem, still concern of AVILA, usually with walking, variable as little 10 feet or fine with some long walk, can play flute for an hour but find difficulties in holding a note. Off daily amiodarone, uses it prn for palpitation, find helpful. Labs reviewed from 1/2018, TSH, Vitamin D, LDL 68.2, A1C 5.9%, CMP - normal renal function, K+ 3.6. To go to [place] for a month in 8/2018.    In 9/2018, return from a month family reunion trip to [place]. Problem with hypotension with Tramadol and Losartan. Had review with Dr. Calderon on 9/5 "Ayla Dela Cruz is a 73 year old female with a longstanding history of atrial arrhythmias and prior ablations at outside institutions. Her [id number] Score is 5 (age, female, TIA, HTN) so she should remain on anticoagulation indefinitely. She is now 11 months post-PVI and MA flutter line, and maintaining sinus rhythm off Amiodarone. However, she is feeling poorly, with frequent PACs and borderline hypotension. The plan is to stop Losartan, echo in next several weeks, Holter monitor in 6 weeks, and follow-up with me in 8 weeks."  Off both medication on 8/31, no problem now. No heart worry. Denies any CP nor SOB. Still teaching. ECG on 9/5 shows NSR with sinus arrhythmia.    In 3/2019, return for follow up, had syncopal spell with hypotension at [facility] required reconstruction of the right ankle, now in PT, doing well. No heart complication, no AF. LDL 90.6 in 1/2019. 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 No heart worries, no CP nor SOB, much better, breathing improved with daily Breo use. Discussed option with NOAC.    In 9/2019, 6 months review, concern of bruising with Xarelto, recall " problem with dark stool with Eliquis. Discussed option.     HPI comments: in 1/2020 return due to allergic reaction to Pradaxa, had to stop Eliquis for similar problems - hives and rash, difficult to sleep. Also problem with embolism when off NOAC for 7 days. Discuss alternatives.    ROS    Constitutional: negative for chills, fatigue, fevers, sweats, no further slurred speech, and no dysarthria, appetite improved, intolerance to heat, bruises easily on NOAC and steroid, weight stable since 3/2018. Hives in the back with rash.  Eyes: negative for icterus, redness and visual disturbance, have visual halo, no more bleed in the right [place]  Respiratory: negative for asthma, cough have resolved off ACE-I, have dyspnea on exertion, occasional SOB with HR 85 to 100 bpm, have lifelong snoring, [place] score 7 improved down to 3, no hemoptysis, pleurisy/chest pain and wheezing  Cardiovascular: negative for chest pain, chest pressure/discomfort, no further orthostatic dizziness, and no palpitations, no paroxysmal nocturnal dyspnea and syncope  Gastrointestinal: negative for abdominal pain, nausea and vomiting, have GERD  Musculoskeletal:positive for arthralgias, and less right sided muscle weakness with improvement in leg strength, improved bilateral ankle pains - OA and no myalgias  Neurological: negative for coordination problems, dizziness, gait problems, headaches, paresthesia, have some tremors but able to draw and no vertigo  Psych: positive for depression, nervousness, anxiety, less stressed due to 's have improved    Objective:    Physical Exam    General appearance: alert, appears stated age, cooperative and no distress, decrease skin turgor.  Head: Normocephalic, without obvious abnormality, atraumatic  Eyes:  conjunctivae/corneas clear. PERRL, EOM's intact.    Neck: no adenopathy, no carotid bruit, no JVD, supple, symmetrical, trachea midline and thyroid not enlarged, symmetric, no  "tenderness/mass/nodules  Lungs:  clear to auscultation bilaterally  Chest wall: no tenderness  Heart: regular rate and rhythm, normal S1, S2 normal, occasional grade 2/6 systolic murmur, click, rub or gallop    Abdomen: soft, non-tender; bowel sounds normal; no masses,  no organomegaly, waist 31" hip 42"  Extremities: extremities no further weakness of the right upper extremity, atraumatic, no cyanosis and have no edema, minor varicose veins  Pulses: 2+ and symmetric    Assessment:       1. Paroxysmal atrial fibrillation, ablations X 3 1995, 1997, 9/2017, CHADS-VAS score 5, HAS-BLED score 5     2. PAF (paroxysmal atrial fibrillation)    3. Localized hives    4. Medication intolerance    5. S/P ablation of atrial flutter    6. TIA (transient ischemic attack), 11/3/2014    7. Renal infarct, due to embolism off Eliquis for 7 days    8. Anticoagulant long-term use    9. Elevated AST (SGOT)    10. Iron deficiency anemia due to chronic blood loss         Plan:       Ayla was seen today for follow-up.    Diagnoses and associated orders for this visit:    Paroxysmal atrial fibrillation, ablations X 3 1995, 1997, 9/2017, CHADS-VAS score 5, HAS-BLED score 5   -     edoxaban (SAVAYSA) 60 mg Tab tablet; Take 1 tablet (60 mg total) by mouth once daily.  Dispense: 30 tablet; Refill: 11  -     Ambulatory consult to Electrophysiology    PAF (paroxysmal atrial fibrillation)  -     EKG 12-lead    Localized hives  -     predniSONE (DELTASONE) 20 MG tablet; Take 1 tablet (20 mg total) by mouth once daily.  Dispense: 30 tablet; Refill: 3    Medication intolerance  -     edoxaban (SAVAYSA) 60 mg Tab tablet; Take 1 tablet (60 mg total) by mouth once daily.  Dispense: 30 tablet; Refill: 11  -     predniSONE (DELTASONE) 20 MG tablet; Take 1 tablet (20 mg total) by mouth once daily.  Dispense: 30 tablet; Refill: 3  -     Ambulatory consult to Electrophysiology    S/P ablation of atrial flutter    TIA (transient ischemic attack), " 11/3/2014    Renal infarct, due to embolism off Eliquis for 7 days    Anticoagulant long-term use  -     edoxaban (SAVAYSA) 60 mg Tab tablet; Take 1 tablet (60 mg total) by mouth once daily.  Dispense: 30 tablet; Refill: 11    Elevated AST (SGOT)    Iron deficiency anemia due to chronic blood loss  -     Iron and TIBC; Future; Expected date: 01/10/2020  -     Ferritin; Future; Expected date: 01/10/2020    - All medical issues reviewed, will try Savaya and prn prednisone.  - CV status stable, off antiarrhythmic, all medications reviewed, patient acknowledge good understanding.  - Recommend using Tylenol as first line pain medications.  - Instruction for Mediterranean diet and heart healthy exercise given.  - Need good exercise program, 4 key elements: 1. Aerobic (walking, swimming, dancing, jogging, biking, etc, 2. Muscle strengthening / resistance exercise, need to do 2-3 times weekly, 3. Stretching daily, good stretch takes a whole  total minute. 4. Balance exercise daily.  - Highly recommend 30 minutes of exercise daily, can have Sunday off, with 2-3 sessions of muscle strengthening weekly. A  would be very helpful.  - Recommend at least biannual cardiovascular evaluation in view of her significant risk factors, patient 's preference      Chronic pruritus, on intermittent steroid    Patient Active Problem List   Diagnosis    Paroxysmal atrial fibrillation, ablations X 3 1995, 1997, 9/2017, CHADS-VAS score 5, HAS-BLED score 5     Hypertension, 1985    Hyperlipidemia, baseline     Glaucoma (increased eye pressure)    Fibroid uterus    Thickened endometrium    Abnormal CT scan, chest    Interstitial lung disease    H/O: CVA (cerebrovascular accident), June 2014    LVH (left ventricular hypertrophy) due to hypertensive disease    Depression    AVILA (dyspnea on exertion)    OA (osteoarthritis)    Chronic diastolic heart failure, 2014    Microalbuminuria    TIA (transient ischemic  attack), 11/3/2014    Chronic pruritus, onset 3/2015    S/P ablation of atrial flutter    JOSE (generalized anxiety disorder)    Other spondylosis, lumbar region    Heart palpitations    Cervical spondylosis    Medication intolerance    Mild persistent asthma without complication    Anticoagulant long-term use    GERD (gastroesophageal reflux disease)    Renal infarct, due to embolism off Eliquis for 7 days    Mild intermittent asthma without complication    Transaminitis    Body mass index (BMI) 19.9 or less, adult    Localized hives    Elevated AST (SGOT)    Iron deficiency anemia due to chronic blood loss     Greater than 50% was spent in counseling and coordination of care. The above assessment and plan have been discussed at length. Labs and procedure over the last 6 months reviewed. Problem List updated. Asked to bring in all active medications / pills bottles with next visit.                                                                     Subjective:    Patient ID:  Ayla Dela Cruz is a 74 y.o. female who presents for evaluation of 6 month f/u and Medication Reaction (pradaxa)  For PAF was on amiodarone 100 mg daily , AVILA, post AF - ablation, LVH, chronic diastolic HF, medication intolerance  PCP: Dr. Olivo, see annually, do labs  Orthopedic: Dr. Aguero  Surgeon: Dr. Gonsalez, and Dr. Dc  Rheumatologist: Dr. Pak  Prior cardiologist: Dr. Gibson, last seen over a year  Pulmonary: Dr. White  Psychologist: Nu Fermin NP, in [place]  Gyn: Dr. Jordan  GI: Dr. Schultz  Neurologist: Dr. Ramirez  Dermatologist: Dennis Corrigan  Pain: Dr. Clancy, injections to neck and both hips very helpful  Lives with , Jan, here with patient, non-smoker, history of prostate CA,  51 years on 6/24  daughter, Lyric, source of stress  Restarted , now 2-4 times weekly, still enjoys it, playing the flute    In 6/2014:  Hospitalized 6/3/2014 for acute right sided  weakness and slurred speech  DCS - Ayla Dela Cruz is a 69 y.o. female admitted to the services of hospital medicine via the ER for acute CVA. Presented with difficulty speaking, facial drooping and weakness of the right upper and lower extremities. She missed the time window for tPA. ST/PT/OT as well as cardiology and neurology were consulted. Dr. Jurado of cardiology managed A fib. Patient rapidly improved functioning with PT/OT/ST. Currently her goals with PT are met as she is ambulating over 400 feet independently.  She was not approved for IP rehab and refuses SNF. She will be discharged home with outpatient PT/ST/OT evaluation and treatment.    Neurocardio work up  CT head - Mild involutional changes and findings suggesting mild microvascular ischemic change with no acute intracranial findings    Carotid US - No hemodynamically significant stenosis is noted by velocity criteria involving either internal carotid artery.  A hemodynamically significant stenosis is defined as a stenosis of greater than 50% of the internal carotid artery vessel lumen    ECHO, 6/4/2014, CONCLUSIONS     1 - Concentric hypertrophy. Wall thickness is mildly increased, with the septum and the posterior wall each measuring 1.1 cm across.    2 - Normal left ventricular systolic function (EF 60-65%).     3 - Mild mitral regurgitation.     4 - Left ventricular diastolic dysfunction.     5 - Pulmonary hypertension. estimated PA systolic pressure is 64 mmHg.    6 - Normal right ventricular systolic function .     7 - Suggestion for increase in LV mass from echo on 3/18/2014..     Echo bubble study also negative. Had episode of PAF during monitoring and convert with restart of Flecainide.   Since DC, improving strength in the right hand, right leg feels normal but tire easier. Speech improving. To receive PT/OT/speech today.  Medications reviewed - will DC ASA for now, and know the effects of the Limbitrol and Ativan, uses prn rarely. Some  loss of appetite and taste.    Active problems in hospital  Acute left hemispheric CVA, MRI suggest multiple areas, was not on OAC nor antiplatelet Rx  PAF with high CHADS-VAS score, post ablations and DCCs  HTN with LVH  Interstitial lung disease  Pulmonary hypertension  Hyperlipidemia was not on statin    Since visit of 6/20, problem with peripheral swelling, now taking Lasix daily. Last hospitalization / CMP, 6/3 showed K+ 3.4 with normal renal function. Also noted AVILA along with exertional chest heaviness, on-and-off over past several years. Noted it with walking and have to rest. Can last up to an hour. Max grade 10/10, yesterday during the day.  in hospital. Also quite anxious, stopped Limbitrol due to side effects, managed by Dr. Olivo. Quite sedentary and major decrease in appetite, due to depression. No Psychiatrist. Home BP high 175/97. ECG shows NSR, rate 61, right axis, nonspecific T waves abnormalities, prolong QTc 483 msec. Low anterior forces.    Holter, 6/30/2014:  PREDOMINANT RHYTHM  1. Sinus rhythm with heart rates varying between 43 and 67 bpm with an average of 55 bpm.     VENTRICULAR ARRHYTHMIAS  1. There were frequent polymorphic PVCs totalling 1388 and averaging 40 per hour.  There was 1 couplet.    2. There were no episodes of ventricular tachycardia.    SUPRA VENTRICULAR ARRHYTHMIAS  1. There were very rare PACs totalling 47 and averaging 1 per hour.     2. There were no episodes of sustained supraventricular tachycardia.    SINUS NODE FUNCTION  1. There was no evidence of high grade SA khai block.     AV CONDUCTION  1. There was no evidence of high grade AV block.     DIARY  1. The diary was returned, but not completed    MISCELLANEOUS  1. This was a tape of adequate length (34 hrs).     Since visit of 7/24, better, reviewed by Dr. Olivo and started antidepressant Lexapro. BMP still showed mild hypokalemia of 3.3, not using Lasix at this time. Still feeling fatigue, anxiety, and  "request refill for Nexium. Discussed need for GI review on chronic PPI. Lack of appetite.  Lexiscan, 7/30/2014, - Nuclear Quantitative Functional Analysis:   LVEF: 66 % (normal is 55 - 69)  LVED Volume: 50 ml (normal is 60 - 98)  LVES Volume: 17 ml (normal is 20 - 42)    Impression: NORMAL MYOCARDIAL PERFUSION  1. The perfusion scan is free of evidence for myocardial ischemia or injury.   2. There is a mild intensity fixed defect in the anteroseptal wall of the left ventricle, secondary to breast attenuation.   3. Resting wall motion is physiologic.   4. Resting LV function is normal.  (normal is 55 - 69)  5. The ventricular volumes are normal at rest and stress.   6. The extracardiac distribution of radioactivity is normal.     No problem with spironolactone, BP improved, home BP similar to office readings.    Since visit of 8/14, had some distress and home monitor showed HR of 40, felt "bad" and nervous to ED, note reviewed, ECG and final pulse count 57-58. Labs reviewed - normal renal function and K+ 3.8. Still taking one tab of K+ daily. Not using Lasix. Explained HR discrepancy may be due to VPCs. Reviewed by Ms. Fermin and Lexapro increased to 20 mg, 2 days ago. Feeling "a lot better". Sleeping better. Still sedentary but ready to be more active. Eating better have lost additional 5 lbs since 8/2014.    Since visit of 9/5, still with speech therapy, noted progressive near syncope with SOB and irregular heart beats. Also noted some ankle swelling over the past 2 weeks. ECG shows marked bradycardia, rate 48, right axis, pulmonary pattern, 1st degree AVB. Wellbutrin 100 mg daily along with Lexapro 20 mg by Ms. Fermin NP. BMP showed K+3.5. To use Lasix PRN    Since visit of 9/25, still with marked AVILA, only able to walk about 30' before having to stop. Bothered by increase palpitations during tapering of BB. No definite lung problem, evaluated this year. ECG shows sinus dewayne, rate 53, VPC, RBBB with suggestion of " septal MI. No evidence for anemia - CBC 9/3/2014 was normal. Just started doing some activities with arm and leg exercise for the last 2 weeks, Doing some OT alternating with PT every other day.  CXR, 6/3/2014 - Lungs are clear of infiltrate and costophrenic sulci are sharp.  The cardiomediastinal silhouette appears stable.    PET scan, 4/2014 - 1.A hypermetabolic left pulmonary mass is noted with an SUV of 3.0 maximally.  A neoplastic, infectious, or granulomatous process is on the differential. Clinical correlation is suggested.  Close follow-up for resolution or biopsy would be suggested    2.  Elevated right-sided heart pressures are suspected with a large right atrium    3.  Right-sided pleural effusion    4.  Hypermetabolic thyroid gland asymmetrically on the right.  This may relate to thyroiditis, Graves' disease, or neoplastic process.  Ultrasound may be helpful.    5.  Small hypermetabolic focus in the expected location of the left ovary, a female pelvis ultrasound may be helpful for further evaluation.  This could relate to a uterine fibroid that has necrosed or infarcted    Biopsy of lung nodule on 5/1/2014 - negative for CA    CTA, 4/2014 - No evidence of pulmonary thromboembolism.    2.  Enlarged cardiac silhouette and secondary findings of elevated right heart pressures with diffuse mosaic lung pattern and right basilar pleural fluid suggesting cardiac decompensation/heart failure.    3. Unchanged 1 cm nodular density within the left upper lobe which previously demonstrated hypermetabolism by PET/CT and unchanged mediastinal lymphadenopathy.    4.  Subpleural nodular density within the right upper lobe is unchanged when compared with the previous study and could represent an area of remote fibrotic scarring.    5.  Heterogeneous appearance of the spleen, possibly due to the phase of contrast enhancement.  Evolving splenic infarction is not entirely excluded.    6. Suggestion of colonic wall thickening  "involving the descending colon.  Please correlate for symptoms of colitis.    Since visit of 10/14, rehospitalized for HF on 10/26 to 10/29/2014.  DCS - "Ayla Dela Cruz is a 69 y.o. female admitted to the services of hospital medicine via the ER for acute diastolic heart failure. C/o severe SOB and increase in leg swelling over the past two days. Under the care of Dr. Jurado. Recently was taken off metoprolol. History of paroxsymal atrial fib. Hospitalized here in June in this year for acute CVA. It was at that time, pt started Xarelto. Deficits has resolved. No history of heart failure.     Hospital Course: The patient was admitted with acute CHF biventricular and mild elevation of her troponin's at 0.029 - 0.035 and therefore an anginal equivalent was suspected. The patient also had significant hypertension upon admission and although she was not in atrial fibrillation, her EKG showed a significant first degree AV block and RBBB. Discussion was made as to whether or not to decrease the patient flecainide; however due to the risk of her going back into atrial fibrillation this was not done. Upon discharge the patient's lisinopril was increased to 10 mg and aldactone was added."    RHC done HEMODYNAMIC RESULTS:    LVEDP (Pre): 24 mmHg  BP: 166/104    Air rest:  PA: 90/34 (54)  PW:  (24)  RVEDP: 16  RA:  (16)    C. SUMMARY:    1. Diastolic dysfunction.  2. - Kee CO 2.64 L/min  3. - Mean systemic pressure 108.6 mmHG, stage II HTN  4. - SVR 41.16 Wood Units  5. - PVR 11.36 Wood Units  6. - Pulmonary HTN due to left sided heart disease and stage II HTN    D. RECOMMENDATIONS:    1. Routine post-cath care.  2. Cardiac rehab referral.  3. Follow up with Dr. Barrett Jurado.  4. - Aggressive BP lowering and diuresis    Repeat ECHO 11/5/2014 CONCLUSIONS     1 - Concentric remodeling. Septal wall thickness is increased, and posterior wall thickness is mildly increased, with the septum measuring 1.3 cm and the posterior wall " "measuring 1.1 cm across.    2 - Mildly to moderately depressed left ventricular systolic function (EF 40-45%).     3 - Left ventricular diastolic dysfunction. E/e'(lat) is 16    4 - Right ventricular enlargement with mildly depressed systolic function.     5 - Pulmonary hypertension. estimated PA systolic pressure is 56 mmHg.     6 - Intermediate central venous pressure.     7 - No evidence of intracardiac shunt.     8 - No significant change from Echo of 6/4/2014.    Active problems:  Progressive severe SOB, functional class IV  Diastolic dysfunction, elevated BNP and Troponin  Hypokalemia due to diuretics  Secondary Pulmonary HTN with peripheral edema  HTN  History of CVA 6/3/2014  PAF controlled with Flecanide  Marked bradycardia with BB  On OAC  Hematuria    At home receiving PT, OT and speech Rx twice a week, with  nurse once a week, no problem with medications. Feeling better, sleeping well. Home /73.    Since visit of 11/14, feeling "much better", concern of occasional HTN, home BP /66-99, HR . Lot more active, no problem. Not using walker, no CP, SOB, nor dizziness. Recent BMP - normal renal function and K+ 4.1.    Since visit of 12/19/2014, worry about HTN home readings similar to office up to . Morning reading is higher. No HA nor visual abnormalities. Xanax has helped but afraid to use too much. ECG shows sinus arrhythmia, rate of 65, late transition and LAFB. Also bothered with dry cough with the Lisinopril. And started to have return of arm pains that was attributed to atorvastatin, on Crestor. Discussed options.     Since visit of 1/16/2015, noted frequent nocturia, 8-10 times, taking losartan-HCT at night time. Have stopped using Lasix and spironolactone for past 2 months. More active without much problem. Labs normal renal function, K+ 4.2, BNP now 37, A1C 5.6%.    Since visit of 2/18/2015, improving, no further dizziness, some SOB when "stressed", usually helped with " "alprazolam, use only prn, about 3-6 times weekly. Compliant with medications. Been off Crestor for 6 weeks with concern of muscle problem. Home BP is fine, similar to office.     Since visit of 4/15, appetite returned, feeling a lot better. Active, walking twice a week for about half an hour. Also do calisthenic about twice a week, no problem. Seeing Dr. Zavaleta for generalized pruritis for past 3 months. Cardiac wise less AVILA. Sleeping well. Labs in 5/2015, normal CBC without eosinophilia, thyroid normal, ferritin level low normal at 21. Off Crestor for couple months due to fear of muscle pain but did not find much difference being off medication. Last Lipid in 11/2014 was good, on daily dose of Crestor. Home /79.    Since visit of 7/2/2015, feeling "great", very active without problem, no more AVILA nor dizziness with standing. Compliant with medications, don't miss. Using Tylenol 500 mg BID for arthritis. Notice easy bruising on Xarelto. Home /10.  Holter - PREDOMINANT RHYTHM  1. Sinus arrhythmia with heart rates varying between 46 and 110 bpm with an average of 73 bpm.     VENTRICULAR ARRHYTHMIAS  1. There were very rare PVCs recorded totalling 8 and averaging less than 1 per hour.     2. There were no episodes of ventricular tachycardia.    SUPRA VENTRICULAR ARRHYTHMIAS  1. There were very frequent PACs totalling 3054 and averaging 132 per hour.  There were 29 bigeminal cycles.  There were 103 couplets.    2. 3% of complexes.    3. There were no episodes of sustained supraventricular tachycardia.    SINUS NODE FUNCTION  1. There was no evidence of high grade SA khai block.     AV CONDUCTION  1. There was no evidence of high grade AV block.     2. The longest RR interval was 1565 msec.     DIARY  1. The diary was properly completed.     2. There was 1 episode of skipping beats reported. The corresponding rhythm strips revealed the following:             During event 1 the rhythm was sinus rhythm at 75 " "bpm.     3. There was 1 episode of skipped beat reported. The corresponding rhythm strips revealed the following:             During event 1 the rhythm was sinus arrhythmia at 63 bpm.     MISCELLANEOUS  1. This was a tape of adequate length (23 hrs).     Since visit of 10/15, place on new antidepressant, Venlafaxine 75 mg daily, tried 2 and developed rapid HR to 125 bpm, feels more anxious, now switched to Citalopram. Also noted the daily dose of Lorazepam does not seem adequate. Orthostatic VS is positive with lying 142/73, , standing 120/62, . Been taking spironolactone 25 mg for last 3 days. Does feel thirsty. Labs reviewed A1C 5.8%, CBC, BMP were WNL. Lipid shows LDL 99, non-. Goal preferred is <70 and < 100. No problem with 10 mg of Crestor. Had vacation at [employer] [place], walked all over without problem.    Since visit of 1/18/2016, no new problem. 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 Labs with , non-, hsCRP at 2.56.     In 10/2016, had acute GB attack with rupture in 8/2016, then tried on Wellbutrin took only 1-2 tabs and BP up to 201/100 with head tightness. Subsequently back to normal, the last 2.5 days took Losartan bid. Discussed Rx options for HTN. Advised to be more active, regular exercise. Lab reviewed LDL in 8/2016 93.     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 Still with daily palpitations, last up to half hours. Have electronic watch, David Barroso, since 9/2016, suppose harmonize patient with the universe. Feeling better if on during the day. Lot of walking at school, no problem.   Holter in 10/2016 - PREDOMINANT RHYTHM  1. Sinus rhythm with heart rates varying between 52 and 144 bpm with an average of 75 bpm.     2. Paroxysmal atrial fibrillation    VENTRICULAR ARRHYTHMIAS  1. There were occasional mostly monomorphic PVCs totalling 596 and averaging 13 per hour.     2. There were no episodes of ventricular " tachycardia.    SUPRA VENTRICULAR ARRHYTHMIAS  1. There were frequent PACs totalling 2982 and averaging 69 per hour.  There were 69 bigeminal cycles.  There were 55 couplets.    2. There were no episodes of sustained supraventricular tachycardia.    3. Paroxysmal atrial fibrillation was noted in the the study.     SINUS NODE FUNCTION  1. There was no evidence of high grade SA khai block.     AV CONDUCTION  1. There was no evidence of high grade AV khai filtering.     2. The longest RR interval was 1725 msec.     DIARY  1. The diary was returned, but not completed    MISCELLANEOUS  1. This was a tape of adequate length (43 hrs).     ECHO CONCLUSIONS     1 - Hyperdynamic left ventricular systolic function (EF 65-70%).     2 - Normal left ventricular diastolic function.     3 - Normal right ventricular systolic function .     4 - Pulmonary hypertension. The estimated PA systolic pressure is 64 mmHg.     5 - Less evidence for LVH from Echo in 11/2014.     In 5/2017, bothered by recurrent PAF with AVILA after 10 feet walking. Felt better before on Flecainide 100 mg bid, but Holter continued to show PAF. Attempt up titration, problem with itching, switched to propafenone 150 mg tid, continued with itching and reviewed by allergist, treated with 10 days of cortisone with benefit. No hive and no itching, just don't feel good due to the irregular rhythm. History reviewed, had 2 prior AF ablation, last in [place], [place] in around 2000, recall being told not to do again. Recall seeing an [facility] EP doc but didn't like him. Discussed various options. Will stop antiarrhythmic for now, will be going on a 16 days trip to [place]. Reviewed prior medications, have taken Tenormin, metoprolol tartrate, and short-acting diltiazem in the past without problem. Refer to Dr. Calderon for review. ECG in AF, rate 99, PRWP, LAFB    In 11/2017, post AF ablation, felt good for a few weeks, noted some SOB and had review with Dr. Calderon on 11/1 - 72  "year old female with a longstanding history of atrial arrhythmias. Her TFX6GO0-[id number] Score is 5 (age, female, TIA, HTN) so she should remain on anticoagulation indefinitely. She has failed Flecainide. She is now 6 weeks post-PVI and MA flutter line, and maintaining sinus rhythm on low-dose Amiodarone. Given her intermittent AVILA which started post-ablation, I have stopped Amiodarone which I think is the likely culprit. I instructed her to continue taking Lasix PRN, as well as metoprolol and Xarelto." ECG on 11/1 was in NSR, rate 61, PRWP.  Recommended to stop amiodarone but then started to feel the palpitation daily, felt nervous and at the same time being taper down on the nightly Ativan. Did have some breakthrough anxiety attacks. Also had strained the upper back and right arm / shoulder, requesting some Tramadol, tried Jan's Tramadol with good relief. Understand this is an opoid. Used Excedrin with caffeine and bothered by more palpitations.    In 3/2018, here for recurrent palpitations, no inciting factors over the past 6 weeks. Had review with Dr. Calderon in 2/2018 - "72 year old female with a longstanding history of atrial arrhythmias and prior ablations at outside institutions. Her LPP0TR5-GBXl Score is 5 (age, female, TIA, HTN) so she should remain on anticoagulation indefinitely. She is now 5 months post-PVI and MA flutter line, and maintaining sinus rhythm off Amiodarone. The plan is to continue Xarelto, and to see me again in 6 months." Palpitations appears associated with AVILA, had difficulties walking in house or up a ramp. Started 100 mg of amiodarone last week, daily, feels some benefit. ECG today in Sinus rhythm vs wandering atrial pacemaker with frequent PJC, rate 59. Also taking Losartan in the morning appears more effective.  Holter 11/2017 - PREDOMINANT RHYTHM  1. Sinus arrhythmia with heart rates varying between 39 and 102 bpm with an average of 58 bpm.     VENTRICULAR ARRHYTHMIAS  1. There were " "occasional PVCs totalling 728 and averaging 15 per hour.  There were 5 bigeminal cycles.     2. There were no episodes of ventricular tachycardia.    SUPRA VENTRICULAR ARRHYTHMIAS  1. There was no supraventricular ectopic activity.    SINUS NODE FUNCTION  1. There was no evidence of high grade SA khai block.     AV CONDUCTION  1. There was no evidence of high grade AV block.     2. The longest RR interval was 1820 msec.     DIARY  1. The diary was not returned    MISCELLANEOUS  1. There were occasional hookup related artifacts. Overall, the study was of good quality.     2. This was a tape of adequate length (48 hrs).     in 5/2018, no new problem, still concern of AVILA, usually with walking, variable as little 10 feet or fine with some long walk, can play flute for an hour but find difficulties in holding a note. Off daily amiodarone, uses it prn for palpitation, find helpful. Labs reviewed from 1/2018, TSH, Vitamin D, LDL 68.2, A1C 5.9%, CMP - normal renal function, K+ 3.6. To go to [place] for a month in 8/2018.    In 9/2018, return from a month family reunion trip to [place]. Problem with hypotension with Tramadol and Losartan. Had review with Dr. Calderon on 9/5 "Ayla Dela Cruz is a 73 year old female with a longstanding history of atrial arrhythmias and prior ablations at outside institutions. Her TFX5LR5-ZYGv Score is 5 (age, female, TIA, HTN) so she should remain on anticoagulation indefinitely. She is now 11 months post-PVI and MA flutter line, and maintaining sinus rhythm off Amiodarone. However, she is feeling poorly, with frequent PACs and borderline hypotension. The plan is to stop Losartan, echo in next several weeks, Holter monitor in 6 weeks, and follow-up with me in 8 weeks."  Off both medication on 8/31, no problem now. No heart worry. Denies any CP nor SOB. Still teaching. ECG on 9/5 shows NSR with sinus arrhythmia.    HPI comments: in 3/2019, return for follow up, had syncopal spell with " hypotension at [employer] required reconstruction of the right ankle, now in PT, doing well. No heart complication, no AF. LDL 90.6 in 1/2019. 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 No heart worries, no CP nor SOB, much better, breathing improved with daily Breo use. Discussed option with NOAC.    ROS    Constitutional: negative for chills, fatigue, fevers, sweats, no further slurred speech, and no dysarthria, appetite improved, intolerance to heat, bruises easily on NOAC and steroid, weight stable since 3/2018.  Eyes: negative for icterus, redness and visual disturbance, have visual halo, no more bleed in the right Iris  Respiratory: negative for asthma, cough have resolved off ACE-I, have dyspnea on exertion, occasional SOB with HR 85 to 100 bpm, have lifelong snoring, [place] score 7 improved down to 5, no hemoptysis, pleurisy/chest pain and wheezing  Cardiovascular: negative for chest pain, chest pressure/discomfort, no further orthostatic dizziness, and no palpitations, no paroxysmal nocturnal dyspnea and syncope  Gastrointestinal: negative for abdominal pain, nausea and vomiting, have GERD  Musculoskeletal:positive for arthralgias, and less right sided muscle weakness with improvement in leg strength, improved bilateral ankle pains - OA and no myalgias  Neurological: negative for coordination problems, dizziness, gait problems, headaches, paresthesia, have some tremors but able to draw and no vertigo  Psych: positive for depression, nervousness, anxiety, less stressed due to 's have improved    Objective:    Physical Exam    General appearance: alert, appears stated age, cooperative and no distress, decrease skin turgor.  Head: Normocephalic, without obvious abnormality, atraumatic  Eyes:  conjunctivae/corneas clear. PERRL, EOM's intact.    Neck: no adenopathy, no carotid bruit, no JVD, supple, symmetrical, trachea midline and thyroid not enlarged, symmetric, no  11/3/2014    Renal infarct, due to embolism off Eliquis for 7 days    Anticoagulant long-term use  -     edoxaban (SAVAYSA) 60 mg Tab tablet; Take 1 tablet (60 mg total) by mouth once daily.  Dispense: 30 tablet; Refill: 11    Elevated AST (SGOT)    Iron deficiency anemia due to chronic blood loss  -     Iron and TIBC; Future; Expected date: 01/10/2020  -     Ferritin; Future; Expected date: 01/10/2020      - All medical issues reviewed, continue current Rx.  - CV status stable, off antiarrhythmic, all medications reviewed, patient acknowledge good understanding.  - Recommend using Tylenol as first line pain medications.  - Instruction for Mediterranean diet and heart healthy exercise given.  - Need good exercise program, 4 key elements: 1. Aerobic (walking, swimming, dancing, jogging, biking, etc, 2. Muscle strengthening / resistance exercise, need to do 2-3 times weekly, 3. Stretching daily, good stretch takes a whole  total minute. 4. Balance exercise daily.  - Highly recommend 30 minutes of exercise daily, can have Sunday off, with 2-3 sessions of muscle strengthening weekly. A  would be very helpful.  - Recommend at least biannual cardiovascular evaluation in view of her significant risk factors, patient 's preference      Chronic pruritus    Depression - improved    Stress at home - improved  - Consider Yoga, Gilberto Chi, or meditation    Elevated brain natriuretic peptide (BNP) level, range 98 - 1045 - improved     Pulmonary hypertension, with right sided congestive heart failure, acute    Chest pain on exertion - resolved    Peripheral edema - imporved    Anxiety - imporved    Patient Active Problem List   Diagnosis    Paroxysmal atrial fibrillation, ablations X 3 1995, 1997, 9/2017, CHADS-VAS score 5, HAS-BLED score 5     Hypertension, 1985    Hyperlipidemia, baseline     Glaucoma (increased eye pressure)    Fibroid uterus    Thickened endometrium    Abnormal CT scan, chest     Interstitial lung disease    H/O: CVA (cerebrovascular accident), June 2014    LVH (left ventricular hypertrophy) due to hypertensive disease    Depression    AVILA (dyspnea on exertion)    OA (osteoarthritis)    Chronic diastolic heart failure, 2014    Microalbuminuria    TIA (transient ischemic attack), 11/3/2014    Chronic pruritus, onset 3/2015    S/P ablation of atrial flutter    JOSE (generalized anxiety disorder)    Other spondylosis, lumbar region    Heart palpitations    Cervical spondylosis    Medication intolerance    Mild persistent asthma without complication    Anticoagulant long-term use    GERD (gastroesophageal reflux disease)    Renal infarct, due to embolism off Eliquis for 7 days    Mild intermittent asthma without complication    Transaminitis    Body mass index (BMI) 19.9 or less, adult    Localized hives    Elevated AST (SGOT)    Iron deficiency anemia due to chronic blood loss     Total face-to-face time with the patient was 30 minutes and greater than 50% was spent in counseling and coordination of care. The above assessment and plan have been discussed at length. Labs and procedure over the last 6 months reviewed. Problem List updated. Asked to bring in all active medications / pills bottles with next visit.                                                                   Subjective:    Patient ID:  Ayla Dela Cruz is a 74 y.o. female who presents for evaluation of 6 month f/u and Medication Reaction (pradaxa)  For PAF, AVILA, post AF - ablation, LVH, chronic diastolic HF, medication intolerance  PCP: Dr. Olivo, see annually, do labs  Orthopedic: Dr. Aguero  Surgeon: Dr. Gonsalez, and Dr. Dc  Rheumatologist: Dr. Mellisa Pace cardiologist: Dr. Gibson, last seen over a year  Pulmonary: Dr. White  Psychologist: Nu Fermin NP, in [place]  Gyn: Dr. Jordan  GI: Dr. Schultz  Neurologist: Dr. Ramirez  Dermatologist: Dennis Corrigan  Pain: Dr. Clancy, injections to  neck and both hips very helpful  Lives with , Jan, here with patient, non-smoker, history of prostate CA,  52 years on 6/24  daughter, Lyric, source of stress  Restarted , now 2-4 times weekly, still enjoys it, playing the flute    Health literacy: high  Activities: house work, some walking, do not feel limited  Nicotine: never  Alcohol: rarely wine  Illicit drugs: no  Cardiac symptoms: none  Home BP: occasional,  to 120  Medication compliance: yes  Diet: regular  Caffeine: no  Labs: 1/2019 LDL 90.6 on Crestor 40 mg, normal TSH, CMP (albumin 3.1), normal CBC, Vit. D  Last Echo: 09/07/17 FELICIA  Last stress test: 07/30/14, Lexiscan  Cardiovascular angiogram: none  ECG: AF rate 76, QTc 486 msec  Fundoscopic exam: biannually, no retinopathy, being treated for glaucoma.    In 6/2014:  Hospitalized 6/3/2014 for acute right sided weakness and slurred speech  DCS - Ayla Dela Cruz is a 69 y.o. female admitted to the services of hospital medicine via the ER for acute CVA. Presented with difficulty speaking, facial drooping and weakness of the right upper and lower extremities. She missed the time window for tPA. ST/PT/OT as well as cardiology and neurology were consulted. Dr. Jurado of cardiology managed A fib. Patient rapidly improved functioning with PT/OT/ST. Currently her goals with PT are met as she is ambulating over 400 feet independently.  She was not approved for IP rehab and refuses SNF. She will be discharged home with outpatient PT/ST/OT evaluation and treatment.    Neurocardio work up  CT head - Mild involutional changes and findings suggesting mild microvascular ischemic change with no acute intracranial findings    Carotid US - No hemodynamically significant stenosis is noted by velocity criteria involving either internal carotid artery.  A hemodynamically significant stenosis is defined as a stenosis of greater than 50% of the internal carotid artery vessel  lumen    ECHO, 6/4/2014, CONCLUSIONS     1 - Concentric hypertrophy. Wall thickness is mildly increased, with the septum and the posterior wall each measuring 1.1 cm across.    2 - Normal left ventricular systolic function (EF 60-65%).     3 - Mild mitral regurgitation.     4 - Left ventricular diastolic dysfunction.     5 - Pulmonary hypertension. estimated PA systolic pressure is 64 mmHg.    6 - Normal right ventricular systolic function .     7 - Suggestion for increase in LV mass from echo on 3/18/2014..     Echo bubble study also negative. Had episode of PAF during monitoring and convert with restart of Flecainide.   Since DC, improving strength in the right hand, right leg feels normal but tire easier. Speech improving. To receive PT/OT/speech today.  Medications reviewed - will DC ASA for now, and know the effects of the Limbitrol and Ativan, uses prn rarely. Some loss of appetite and taste.    Active problems in hospital  Acute left hemispheric CVA, MRI suggest multiple areas, was not on OAC nor antiplatelet Rx  PAF with high CHADS-VAS score, post ablations and DCCs  HTN with LVH  Interstitial lung disease  Pulmonary hypertension  Hyperlipidemia was not on statin    Since visit of 6/20, problem with peripheral swelling, now taking Lasix daily. Last hospitalization / CMP, 6/3 showed K+ 3.4 with normal renal function. Also noted AVILA along with exertional chest heaviness, on-and-off over past several years. Noted it with walking and have to rest. Can last up to an hour. Max grade 10/10, yesterday during the day.  in hospital. Also quite anxious, stopped Limbitrol due to side effects, managed by Dr. Olivo. Quite sedentary and major decrease in appetite, due to depression. No Psychiatrist. Home BP high 175/97. ECG shows NSR, rate 61, right axis, nonspecific T waves abnormalities, prolong QTc 483 msec. Low anterior forces.    Holter, 6/30/2014:  PREDOMINANT RHYTHM  1. Sinus rhythm with heart rates varying  "between 43 and 67 bpm with an average of 55 bpm.     VENTRICULAR ARRHYTHMIAS  1. There were frequent polymorphic PVCs totalling 1388 and averaging 40 per hour.  There was 1 couplet.    2. There were no episodes of ventricular tachycardia.    SUPRA VENTRICULAR ARRHYTHMIAS  1. There were very rare PACs totalling 47 and averaging 1 per hour.     2. There were no episodes of sustained supraventricular tachycardia.    SINUS NODE FUNCTION  1. There was no evidence of high grade SA khai block.     AV CONDUCTION  1. There was no evidence of high grade AV block.     DIARY  1. The diary was returned, but not completed    MISCELLANEOUS  1. This was a tape of adequate length (34 hrs).     Since visit of 7/24, better, reviewed by Dr. Olivo and started antidepressant Lexapro. BMP still showed mild hypokalemia of 3.3, not using Lasix at this time. Still feeling fatigue, anxiety, and request refill for Nexium. Discussed need for GI review on chronic PPI. Lack of appetite.  Lexiscan, 7/30/2014, - Nuclear Quantitative Functional Analysis:   LVEF: 66 % (normal is 55 - 69)  LVED Volume: 50 ml (normal is 60 - 98)  LVES Volume: 17 ml (normal is 20 - 42)    Impression: NORMAL MYOCARDIAL PERFUSION  1. The perfusion scan is free of evidence for myocardial ischemia or injury.   2. There is a mild intensity fixed defect in the anteroseptal wall of the left ventricle, secondary to breast attenuation.   3. Resting wall motion is physiologic.   4. Resting LV function is normal.  (normal is 55 - 69)  5. The ventricular volumes are normal at rest and stress.   6. The extracardiac distribution of radioactivity is normal.     No problem with spironolactone, BP improved, home BP similar to office readings.    Since visit of 8/14, had some distress and home monitor showed HR of 40, felt "bad" and nervous to ED, note reviewed, ECG and final pulse count 57-58. Labs reviewed - normal renal function and K+ 3.8. Still taking one tab of K+ daily. Not " "using Lasix. Explained HR discrepancy may be due to VPCs. Reviewed by Ms. Markellroni and Lexapro increased to 20 mg, 2 days ago. Feeling "a lot better". Sleeping better. Still sedentary but ready to be more active. Eating better have lost additional 5 lbs since 8/2014.    Since visit of 9/5, still with speech therapy, noted progressive near syncope with SOB and irregular heart beats. Also noted some ankle swelling over the past 2 weeks. ECG shows marked bradycardia, rate 48, right axis, pulmonary pattern, 1st degree AVB. Wellbutrin 100 mg daily along with Lexapro 20 mg by MsEmy Jeny NP. BMP showed K+3.5. To use Lasix PRN    Since visit of 9/25, still with marked AVILA, only able to walk about 30' before having to stop. Bothered by increase palpitations during tapering of BB. No definite lung problem, evaluated this year. ECG shows sinus dewayne, rate 53, VPC, RBBB with suggestion of septal MI. No evidence for anemia - CBC 9/3/2014 was normal. Just started doing some activities with arm and leg exercise for the last 2 weeks, Doing some OT alternating with PT every other day.  CXR, 6/3/2014 - Lungs are clear of infiltrate and costophrenic sulci are sharp.  The cardiomediastinal silhouette appears stable.    PET scan, 4/2014 - 1.A hypermetabolic left pulmonary mass is noted with an SUV of 3.0 maximally.  A neoplastic, infectious, or granulomatous process is on the differential. Clinical correlation is suggested.  Close follow-up for resolution or biopsy would be suggested    2.  Elevated right-sided heart pressures are suspected with a large right atrium    3.  Right-sided pleural effusion    4.  Hypermetabolic thyroid gland asymmetrically on the right.  This may relate to thyroiditis, Graves' disease, or neoplastic process.  Ultrasound may be helpful.    5.  Small hypermetabolic focus in the expected location of the left ovary, a female pelvis ultrasound may be helpful for further evaluation.  This could relate to a uterine " "fibroid that has necrosed or infarcted    Biopsy of lung nodule on 5/1/2014 - negative for CA    CTA, 4/2014 - No evidence of pulmonary thromboembolism.    2.  Enlarged cardiac silhouette and secondary findings of elevated right heart pressures with diffuse mosaic lung pattern and right basilar pleural fluid suggesting cardiac decompensation/heart failure.    3. Unchanged 1 cm nodular density within the left upper lobe which previously demonstrated hypermetabolism by PET/CT and unchanged mediastinal lymphadenopathy.    4.  Subpleural nodular density within the right upper lobe is unchanged when compared with the previous study and could represent an area of remote fibrotic scarring.    5.  Heterogeneous appearance of the spleen, possibly due to the phase of contrast enhancement.  Evolving splenic infarction is not entirely excluded.    6. Suggestion of colonic wall thickening involving the descending colon.  Please correlate for symptoms of colitis.    Since visit of 10/14, rehospitalized for HF on 10/26 to 10/29/2014.  DCS - "Ayla Dela Cruz is a 69 y.o. female admitted to the services of hospital medicine via the ER for acute diastolic heart failure. C/o severe SOB and increase in leg swelling over the past two days. Under the care of Dr. Jurado. Recently was taken off metoprolol. History of paroxsymal atrial fib. Hospitalized here in June in this year for acute CVA. It was at that time, pt started Xarelto. Deficits has resolved. No history of heart failure.     Hospital Course: The patient was admitted with acute CHF biventricular and mild elevation of her troponin's at 0.029 - 0.035 and therefore an anginal equivalent was suspected. The patient also had significant hypertension upon admission and although she was not in atrial fibrillation, her EKG showed a significant first degree AV block and RBBB. Discussion was made as to whether or not to decrease the patient flecainide; however due to the risk of her going " "back into atrial fibrillation this was not done. Upon discharge the patient's lisinopril was increased to 10 mg and aldactone was added."    RHC done HEMODYNAMIC RESULTS:    LVEDP (Pre): 24 mmHg  BP: 166/104    Air rest:  PA: 90/34 (54)  PW:  (24)  RVEDP: 16  RA:  (16)    C. SUMMARY:    1. Diastolic dysfunction.  2. - Kee CO 2.64 L/min  3. - Mean systemic pressure 108.6 mmHG, stage II HTN  4. - SVR 41.16 Wood Units  5. - PVR 11.36 Wood Units  6. - Pulmonary HTN due to left sided heart disease and stage II HTN    D. RECOMMENDATIONS:    1. Routine post-cath care.  2. Cardiac rehab referral.  3. Follow up with Dr. Barrett Jurado.  4. - Aggressive BP lowering and diuresis    Repeat ECHO 11/5/2014 CONCLUSIONS     1 - Concentric remodeling. Septal wall thickness is increased, and posterior wall thickness is mildly increased, with the septum measuring 1.3 cm and the posterior wall measuring 1.1 cm across.    2 - Mildly to moderately depressed left ventricular systolic function (EF 40-45%).     3 - Left ventricular diastolic dysfunction. E/e'(lat) is 16    4 - Right ventricular enlargement with mildly depressed systolic function.     5 - Pulmonary hypertension. estimated PA systolic pressure is 56 mmHg.     6 - Intermediate central venous pressure.     7 - No evidence of intracardiac shunt.     8 - No significant change from Echo of 6/4/2014.    Active problems:  Progressive severe SOB, functional class IV  Diastolic dysfunction, elevated BNP and Troponin  Hypokalemia due to diuretics  Secondary Pulmonary HTN with peripheral edema  HTN  History of CVA 6/3/2014  PAF controlled with Flecanide  Marked bradycardia with BB  On OAC  Hematuria    At home receiving PT, OT and speech Rx twice a week, with  nurse once a week, no problem with medications. Feeling better, sleeping well. Home /73.    Since visit of 11/14, feeling "much better", concern of occasional HTN, home BP /66-99, HR . Lot more active, no problem. " "Not using walker, no CP, SOB, nor dizziness. Recent BMP - normal renal function and K+ 4.1.    Since visit of 12/19/2014, worry about HTN home readings similar to office up to . Morning reading is higher. No HA nor visual abnormalities. Xanax has helped but afraid to use too much. ECG shows sinus arrhythmia, rate of 65, late transition and LAFB. Also bothered with dry cough with the Lisinopril. And started to have return of arm pains that was attributed to atorvastatin, on Crestor. Discussed options.     Since visit of 1/16/2015, noted frequent nocturia, 8-10 times, taking losartan-HCT at night time. Have stopped using Lasix and spironolactone for past 2 months. More active without much problem. Labs normal renal function, K+ 4.2, BNP now 37, A1C 5.6%.    Since visit of 2/18/2015, improving, no further dizziness, some SOB when "stressed", usually helped with alprazolam, use only prn, about 3-6 times weekly. Compliant with medications. Been off Crestor for 6 weeks with concern of muscle problem. Home BP is fine, similar to office.     Since visit of 4/15, appetite returned, feeling a lot better. Active, walking twice a week for about half an hour. Also do calisthenic about twice a week, no problem. Seeing Dr. Zavaleta for generalized pruritis for past 3 months. Cardiac wise less AVILA. Sleeping well. Labs in 5/2015, normal CBC without eosinophilia, thyroid normal, ferritin level low normal at 21. Off Crestor for couple months due to fear of muscle pain but did not find much difference being off medication. Last Lipid in 11/2014 was good, on daily dose of Crestor. Home /79.    Since visit of 7/2/2015, feeling "great", very active without problem, no more AVILA nor dizziness with standing. Compliant with medications, don't miss. Using Tylenol 500 mg BID for arthritis. Notice easy bruising on Xarelto. Home /10.  Holter - PREDOMINANT RHYTHM  1. Sinus arrhythmia with heart rates varying between 46 and 110 bpm " with an average of 73 bpm.     VENTRICULAR ARRHYTHMIAS  1. There were very rare PVCs recorded totalling 8 and averaging less than 1 per hour.     2. There were no episodes of ventricular tachycardia.    SUPRA VENTRICULAR ARRHYTHMIAS  1. There were very frequent PACs totalling 3054 and averaging 132 per hour.  There were 29 bigeminal cycles.  There were 103 couplets.    2. 3% of complexes.    3. There were no episodes of sustained supraventricular tachycardia.    SINUS NODE FUNCTION  1. There was no evidence of high grade SA khai block.     AV CONDUCTION  1. There was no evidence of high grade AV block.     2. The longest RR interval was 1565 msec.     DIARY  1. The diary was properly completed.     2. There was 1 episode of skipping beats reported. The corresponding rhythm strips revealed the following:             During event 1 the rhythm was sinus rhythm at 75 bpm.     3. There was 1 episode of skipped beat reported. The corresponding rhythm strips revealed the following:             During event 1 the rhythm was sinus arrhythmia at 63 bpm.     MISCELLANEOUS  1. This was a tape of adequate length (23 hrs).     Since visit of 10/15, place on new antidepressant, Venlafaxine 75 mg daily, tried 2 and developed rapid HR to 125 bpm, feels more anxious, now switched to Citalopram. Also noted the daily dose of Lorazepam does not seem adequate. Orthostatic VS is positive with lying 142/73, , standing 120/62, . Been taking spironolactone 25 mg for last 3 days. Does feel thirsty. Labs reviewed A1C 5.8%, CBC, BMP were WNL. Lipid shows LDL 99, non-. Goal preferred is <70 and < 100. No problem with 10 mg of Crestor. Had vacation at [employer] [place], walked all over without problem.    Since visit of 1/18/2016, no new problem. 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 Labs with , non-, hsCRP at 2.56.     In 10/2016, had acute GB attack with rupture in 8/2016, then tried on  "Wellbutrin took only 1-2 tabs and BP up to 201/100 with head tightness. Subsequently back to normal, the last 2.5 days took Losartan bid. Discussed Rx options for HTN. Advised to be more active, regular exercise. Lab reviewed LDL in 8/2016 93.     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 Still with daily palpitations, last up to half hours. Have electronic watch, David Barroso, since 9/2016, suppose harmonize patient with the universe. Feeling better if on during the day. Lot of walking at school, no problem.   Holter in 10/2016 - PREDOMINANT RHYTHM  1. Sinus rhythm with heart rates varying between 52 and 144 bpm with an average of 75 bpm.     2. Paroxysmal atrial fibrillation    VENTRICULAR ARRHYTHMIAS  1. There were occasional mostly monomorphic PVCs totalling 596 and averaging 13 per hour.     2. There were no episodes of ventricular tachycardia.    SUPRA VENTRICULAR ARRHYTHMIAS  1. There were frequent PACs totalling 2982 and averaging 69 per hour.  There were 69 bigeminal cycles.  There were 55 couplets.    2. There were no episodes of sustained supraventricular tachycardia.    3. Paroxysmal atrial fibrillation was noted in the the study.     SINUS NODE FUNCTION  1. There was no evidence of high grade SA khai block.     AV CONDUCTION  1. There was no evidence of high grade AV khai filtering.     2. The longest RR interval was 1725 msec.     DIARY  1. The diary was returned, but not completed    MISCELLANEOUS  1. This was a tape of adequate length (43 hrs).     ECHO CONCLUSIONS     1 - Hyperdynamic left ventricular systolic function (EF 65-70%).     2 - Normal left ventricular diastolic function.     3 - Normal right ventricular systolic function .     4 - Pulmonary hypertension. The estimated PA systolic pressure is 64 mmHg.     5 - Less evidence for LVH from Echo in 11/2014.     In 5/2017, bothered by recurrent PAF with AVILA after 10 feet walking. Felt better before on Flecainide 100 mg bid, but " "Holter continued to show PAF. Attempt up titration, problem with itching, switched to propafenone 150 mg tid, continued with itching and reviewed by allergist, treated with 10 days of cortisone with benefit. No hive and no itching, just don't feel good due to the irregular rhythm. History reviewed, had 2 prior AF ablation, last in [place], [place] in around 2000, recall being told not to do again. Recall seeing an [facility] EP doc but didn't like him. Discussed various options. Will stop antiarrhythmic for now, will be going on a 16 days trip to [place]. Reviewed prior medications, have taken Tenormin, metoprolol tartrate, and short-acting diltiazem in the past without problem. Refer to Dr. Calderon for review. ECG in AF, rate 99, PRWP, LAFB    In 11/2017, post AF ablation, felt good for a few weeks, noted some SOB and had review with Dr. Calderon on 11/1 - 72 year old female with a longstanding history of atrial arrhythmias. Her [id number] Score is 5 (age, female, TIA, HTN) so she should remain on anticoagulation indefinitely. She has failed Flecainide. She is now 6 weeks post-PVI and MA flutter line, and maintaining sinus rhythm on low-dose Amiodarone. Given her intermittent AVILA which started post-ablation, I have stopped Amiodarone which I think is the likely culprit. I instructed her to continue taking Lasix PRN, as well as metoprolol and Xarelto." ECG on 11/1 was in NSR, rate 61, PRWP.  Recommended to stop amiodarone but then started to feel the palpitation daily, felt nervous and at the same time being taper down on the nightly Ativan. Did have some breakthrough anxiety attacks. Also had strained the upper back and right arm / shoulder, requesting some Tramadol, tried Jan's Tramadol with good relief. Understand this is an opoid. Used Excedrin with caffeine and bothered by more palpitations.    In 3/2018, here for recurrent palpitations, no inciting factors over the past 6 weeks. Had review with Dr. Calderon in 2/2018 " "- "72 year old female with a longstanding history of atrial arrhythmias and prior ablations at outside institutions. Her [id number] Score is 5 (age, female, TIA, HTN) so she should remain on anticoagulation indefinitely. She is now 5 months post-PVI and MA flutter line, and maintaining sinus rhythm off Amiodarone. The plan is to continue Xarelto, and to see me again in 6 months." Palpitations appears associated with AVILA, had difficulties walking in house or up a ramp. Started 100 mg of amiodarone last week, daily, feels some benefit. ECG today in Sinus rhythm vs wandering atrial pacemaker with frequent PJC, rate 59. Also taking Losartan in the morning appears more effective.  Holter 11/2017 - PREDOMINANT RHYTHM  1. Sinus arrhythmia with heart rates varying between 39 and 102 bpm with an average of 58 bpm.     VENTRICULAR ARRHYTHMIAS  1. There were occasional PVCs totalling 728 and averaging 15 per hour.  There were 5 bigeminal cycles.     2. There were no episodes of ventricular tachycardia.    SUPRA VENTRICULAR ARRHYTHMIAS  1. There was no supraventricular ectopic activity.    SINUS NODE FUNCTION  1. There was no evidence of high grade SA khai block.     AV CONDUCTION  1. There was no evidence of high grade AV block.     2. The longest RR interval was 1820 msec.     DIARY  1. The diary was not returned    MISCELLANEOUS  1. There were occasional hookup related artifacts. Overall, the study was of good quality.     2. This was a tape of adequate length (48 hrs).     in 5/2018, no new problem, still concern of AVILA, usually with walking, variable as little 10 feet or fine with some long walk, can play flute for an hour but find difficulties in holding a note. Off daily amiodarone, uses it prn for palpitation, find helpful. Labs reviewed from 1/2018, TSH, Vitamin D, LDL 68.2, A1C 5.9%, CMP - normal renal function, K+ 3.6. To go to [place] for a month in 8/2018.    In 9/2018, return from a month family reunion trip " "to [place]. Problem with hypotension with Tramadol and Losartan. Had review with Dr. Calderon on 9/5 "Ayla Dela Cruz is a 73 year old female with a longstanding history of atrial arrhythmias and prior ablations at outside institutions. Her [id number] Score is 5 (age, female, TIA, HTN) so she should remain on anticoagulation indefinitely. She is now 11 months post-PVI and MA flutter line, and maintaining sinus rhythm off Amiodarone. However, she is feeling poorly, with frequent PACs and borderline hypotension. The plan is to stop Losartan, echo in next several weeks, Holter monitor in 6 weeks, and follow-up with me in 8 weeks."  Off both medication on 8/31, no problem now. No heart worry. Denies any CP nor SOB. Still teaching. ECG on 9/5 shows NSR with sinus arrhythmia.    In 3/2019, return for follow up, had syncopal spell with hypotension at [facility] required reconstruction of the right ankle, now in PT, doing well. No heart complication, no AF. LDL 90.6 in 1/2019. 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 No heart worries, no CP nor SOB, much better, breathing improved with daily Breo use. Discussed option with NOAC.    HPI comments: in 9/2019, 6 months review, concern of bruising with Xarelto, recall problem with dark stool with Eliquis. Discussed option.     ROS    Constitutional: negative for chills, fatigue, fevers, sweats, no further slurred speech, and no dysarthria, appetite improved, intolerance to heat, bruises easily on NOAC and steroid, weight stable since 3/2018.  Eyes: negative for icterus, redness and visual disturbance, have visual halo, no more bleed in the right Iris  Respiratory: negative for asthma, cough have resolved off ACE-I, have dyspnea on exertion, occasional SOB with HR 85 to 100 bpm, have lifelong snoring, [place] score 7 improved down to 3, no hemoptysis, pleurisy/chest pain and wheezing  Cardiovascular: negative for chest pain, chest pressure/discomfort, no " "further orthostatic dizziness, and no palpitations, no paroxysmal nocturnal dyspnea and syncope  Gastrointestinal: negative for abdominal pain, nausea and vomiting, have GERD  Musculoskeletal:positive for arthralgias, and less right sided muscle weakness with improvement in leg strength, improved bilateral ankle pains - OA and no myalgias  Neurological: negative for coordination problems, dizziness, gait problems, headaches, paresthesia, have some tremors but able to draw and no vertigo  Psych: positive for depression, nervousness, anxiety, less stressed due to 's have improved    Objective:    Physical Exam    General appearance: alert, appears stated age, cooperative and no distress, decrease skin turgor.  Head: Normocephalic, without obvious abnormality, atraumatic  Eyes:  conjunctivae/corneas clear. PERRL, EOM's intact.    Neck: no adenopathy, no carotid bruit, no JVD, supple, symmetrical, trachea midline and thyroid not enlarged, symmetric, no tenderness/mass/nodules  Lungs:  clear to auscultation bilaterally  Chest wall: no tenderness  Heart: regular rate and rhythm, normal S1, S2 normal, occasional grade 2/6 systolic murmur, click, rub or gallop    Abdomen: soft, non-tender; bowel sounds normal; no masses,  no organomegaly, waist 31" hip 42"  Extremities: extremities no further weakness of the right upper extremity, atraumatic, no cyanosis and have no edema, minor varicose veins  Pulses: 2+ and symmetric    Assessment:       1. Paroxysmal atrial fibrillation, ablations X 3 1995, 1997, 9/2017, CHADS-VAS score 5, HAS-BLED score 5     2. PAF (paroxysmal atrial fibrillation)    3. Localized hives    4. Medication intolerance    5. S/P ablation of atrial flutter    6. TIA (transient ischemic attack), 11/3/2014    7. Renal infarct, due to embolism off Eliquis for 7 days    8. Anticoagulant long-term use    9. Elevated AST (SGOT)    10. Iron deficiency anemia due to chronic blood loss         Plan:     "   Ayla was seen today for follow-up.    Diagnoses and associated orders for this visit:    Paroxysmal atrial fibrillation, ablations X 3 1995, 1997, 9/2017, CHADS-VAS score 5, HAS-BLED score 5   -     edoxaban (SAVAYSA) 60 mg Tab tablet; Take 1 tablet (60 mg total) by mouth once daily.  Dispense: 30 tablet; Refill: 11  -     Ambulatory consult to Electrophysiology    PAF (paroxysmal atrial fibrillation)  -     EKG 12-lead    Localized hives  -     predniSONE (DELTASONE) 20 MG tablet; Take 1 tablet (20 mg total) by mouth once daily.  Dispense: 30 tablet; Refill: 3    Medication intolerance  -     edoxaban (SAVAYSA) 60 mg Tab tablet; Take 1 tablet (60 mg total) by mouth once daily.  Dispense: 30 tablet; Refill: 11  -     predniSONE (DELTASONE) 20 MG tablet; Take 1 tablet (20 mg total) by mouth once daily.  Dispense: 30 tablet; Refill: 3  -     Ambulatory consult to Electrophysiology    S/P ablation of atrial flutter    TIA (transient ischemic attack), 11/3/2014    Renal infarct, due to embolism off Eliquis for 7 days    Anticoagulant long-term use  -     edoxaban (SAVAYSA) 60 mg Tab tablet; Take 1 tablet (60 mg total) by mouth once daily.  Dispense: 30 tablet; Refill: 11    Elevated AST (SGOT)    Iron deficiency anemia due to chronic blood loss  -     Iron and TIBC; Future; Expected date: 01/10/2020  -     Ferritin; Future; Expected date: 01/10/2020      - All medical issues reviewed, wants to retry Eliquis  - CV status stable, off antiarrhythmic, all medications reviewed, patient acknowledge good understanding.  - Recommend using Tylenol as first line pain medications.  - Instruction for Mediterranean diet and heart healthy exercise given.  - Need good exercise program, 4 key elements: 1. Aerobic (walking, swimming, dancing, jogging, biking, etc, 2. Muscle strengthening / resistance exercise, need to do 2-3 times weekly, 3. Stretching daily, good stretch takes a whole  total minute. 4. Balance exercise  daily.  - Highly recommend 30 minutes of exercise daily, can have Sunday off, with 2-3 sessions of muscle strengthening weekly. A  would be very helpful.  - Recommend at least biannual cardiovascular evaluation in view of her significant risk factors, patient 's preference      Chronic pruritus, on intermittent steroid    Depression - improved    Stress at home - improved  - Consider Yoga, Gilberto Chi, or meditation    Elevated brain natriuretic peptide (BNP) level, range 98 - 1045 - improved     Pulmonary hypertension, with right sided congestive heart failure, acute    Chest pain on exertion - resolved    Peripheral edema - imporved    Anxiety - imporved    Patient Active Problem List   Diagnosis    Paroxysmal atrial fibrillation, ablations X 3 1995, 1997, 9/2017, CHADS-VAS score 5, HAS-BLED score 5     Hypertension, 1985    Hyperlipidemia, baseline     Glaucoma (increased eye pressure)    Fibroid uterus    Thickened endometrium    Abnormal CT scan, chest    Interstitial lung disease    H/O: CVA (cerebrovascular accident), June 2014    LVH (left ventricular hypertrophy) due to hypertensive disease    Depression    AVILA (dyspnea on exertion)    OA (osteoarthritis)    Chronic diastolic heart failure, 2014    Microalbuminuria    TIA (transient ischemic attack), 11/3/2014    Chronic pruritus, onset 3/2015    S/P ablation of atrial flutter    JOSE (generalized anxiety disorder)    Other spondylosis, lumbar region    Heart palpitations    Cervical spondylosis    Medication intolerance    Mild persistent asthma without complication    Anticoagulant long-term use    GERD (gastroesophageal reflux disease)    Renal infarct, due to embolism off Eliquis for 7 days    Mild intermittent asthma without complication    Transaminitis    Body mass index (BMI) 19.9 or less, adult    Localized hives    Elevated AST (SGOT)    Iron deficiency anemia due to chronic blood loss     Total  face-to-face time with the patient was 30 minutes and greater than 50% was spent in counseling and coordination of care. The above assessment and plan have been discussed at length. Labs and procedure over the last 6 months reviewed. Problem List updated. Asked to bring in all active medications / pills bottles with next visit.                                                                     Subjective:    Patient ID:  Ayla Dela Cruz is a 74 y.o. female who presents for evaluation of 6 month f/u and Medication Reaction (pradaxa)  For PAF was on amiodarone 100 mg daily , AVILA, post AF - ablation, LVH, chronic diastolic HF, medication intolerance  PCP: Dr. Olivo, see annually, do labs  Orthopedic: Dr. Aguero  Surgeon: Dr. Gonsalez, and Dr. Dc  Rheumatologist: Dr. Pak  Prior cardiologist: Dr. Gibson, last seen over a year  Pulmonary: Dr. White  Psychologist: Nu Fermin, AGUILAR, in [place]  Gyn: Dr. Jordan  GI: Dr. Schultz  Neurologist: Dr. Ramirez  Dermatologist: Dennis Corrigan  Pain: Dr. Clancy, injections to neck and both hips very helpful  Lives with , Jan, here with patient, non-smoker, history of prostate CA,  51 years on 6/24  daughter, Lyric, source of stress  Restarted , now 2-4 times weekly, still enjoys it, playing the flute    In 6/2014:  Hospitalized 6/3/2014 for acute right sided weakness and slurred speech  DCS - Ayla Dela Cruz is a 69 y.o. female admitted to the services of hospital medicine via the ER for acute CVA. Presented with difficulty speaking, facial drooping and weakness of the right upper and lower extremities. She missed the time window for tPA. ST/PT/OT as well as cardiology and neurology were consulted. Dr. Jurado of cardiology managed A fib. Patient rapidly improved functioning with PT/OT/ST. Currently her goals with PT are met as she is ambulating over 400 feet independently.  She was not approved for IP rehab and refuses SNF. She will be  discharged home with outpatient PT/ST/OT evaluation and treatment.    Neurocardio work up  CT head - Mild involutional changes and findings suggesting mild microvascular ischemic change with no acute intracranial findings    Carotid US - No hemodynamically significant stenosis is noted by velocity criteria involving either internal carotid artery.  A hemodynamically significant stenosis is defined as a stenosis of greater than 50% of the internal carotid artery vessel lumen    ECHO, 6/4/2014, CONCLUSIONS     1 - Concentric hypertrophy. Wall thickness is mildly increased, with the septum and the posterior wall each measuring 1.1 cm across.    2 - Normal left ventricular systolic function (EF 60-65%).     3 - Mild mitral regurgitation.     4 - Left ventricular diastolic dysfunction.     5 - Pulmonary hypertension. estimated PA systolic pressure is 64 mmHg.    6 - Normal right ventricular systolic function .     7 - Suggestion for increase in LV mass from echo on 3/18/2014..     Echo bubble study also negative. Had episode of PAF during monitoring and convert with restart of Flecainide.   Since DC, improving strength in the right hand, right leg feels normal but tire easier. Speech improving. To receive PT/OT/speech today.  Medications reviewed - will DC ASA for now, and know the effects of the Limbitrol and Ativan, uses prn rarely. Some loss of appetite and taste.    Active problems in hospital  Acute left hemispheric CVA, MRI suggest multiple areas, was not on OAC nor antiplatelet Rx  PAF with high CHADS-VAS score, post ablations and DCCs  HTN with LVH  Interstitial lung disease  Pulmonary hypertension  Hyperlipidemia was not on statin    Since visit of 6/20, problem with peripheral swelling, now taking Lasix daily. Last hospitalization / CMP, 6/3 showed K+ 3.4 with normal renal function. Also noted AVILA along with exertional chest heaviness, on-and-off over past several years. Noted it with walking and have to rest.  Can last up to an hour. Max grade 10/10, yesterday during the day.  in hospital. Also quite anxious, stopped Limbitrol due to side effects, managed by Dr. Olivo. Quite sedentary and major decrease in appetite, due to depression. No Psychiatrist. Home BP high 175/97. ECG shows NSR, rate 61, right axis, nonspecific T waves abnormalities, prolong QTc 483 msec. Low anterior forces.    Holter, 6/30/2014:  PREDOMINANT RHYTHM  1. Sinus rhythm with heart rates varying between 43 and 67 bpm with an average of 55 bpm.     VENTRICULAR ARRHYTHMIAS  1. There were frequent polymorphic PVCs totalling 1388 and averaging 40 per hour.  There was 1 couplet.    2. There were no episodes of ventricular tachycardia.    SUPRA VENTRICULAR ARRHYTHMIAS  1. There were very rare PACs totalling 47 and averaging 1 per hour.     2. There were no episodes of sustained supraventricular tachycardia.    SINUS NODE FUNCTION  1. There was no evidence of high grade SA khai block.     AV CONDUCTION  1. There was no evidence of high grade AV block.     DIARY  1. The diary was returned, but not completed    MISCELLANEOUS  1. This was a tape of adequate length (34 hrs).     Since visit of 7/24, better, reviewed by Dr. Olivo and started antidepressant Lexapro. BMP still showed mild hypokalemia of 3.3, not using Lasix at this time. Still feeling fatigue, anxiety, and request refill for Nexium. Discussed need for GI review on chronic PPI. Lack of appetite.  Lexiscan, 7/30/2014, - Nuclear Quantitative Functional Analysis:   LVEF: 66 % (normal is 55 - 69)  LVED Volume: 50 ml (normal is 60 - 98)  LVES Volume: 17 ml (normal is 20 - 42)    Impression: NORMAL MYOCARDIAL PERFUSION  1. The perfusion scan is free of evidence for myocardial ischemia or injury.   2. There is a mild intensity fixed defect in the anteroseptal wall of the left ventricle, secondary to breast attenuation.   3. Resting wall motion is physiologic.   4. Resting LV function is normal.   "(normal is 55 - 69)  5. The ventricular volumes are normal at rest and stress.   6. The extracardiac distribution of radioactivity is normal.     No problem with spironolactone, BP improved, home BP similar to office readings.    Since visit of 8/14, had some distress and home monitor showed HR of 40, felt "bad" and nervous to ED, note reviewed, ECG and final pulse count 57-58. Labs reviewed - normal renal function and K+ 3.8. Still taking one tab of K+ daily. Not using Lasix. Explained HR discrepancy may be due to VPCs. Reviewed by Ms. Fermin and Lexapro increased to 20 mg, 2 days ago. Feeling "a lot better". Sleeping better. Still sedentary but ready to be more active. Eating better have lost additional 5 lbs since 8/2014.    Since visit of 9/5, still with speech therapy, noted progressive near syncope with SOB and irregular heart beats. Also noted some ankle swelling over the past 2 weeks. ECG shows marked bradycardia, rate 48, right axis, pulmonary pattern, 1st degree AVB. Wellbutrin 100 mg daily along with Lexapro 20 mg by Ms. Fermin NP. BMP showed K+3.5. To use Lasix PRN    Since visit of 9/25, still with marked AVILA, only able to walk about 30' before having to stop. Bothered by increase palpitations during tapering of BB. No definite lung problem, evaluated this year. ECG shows sinus dewayne, rate 53, VPC, RBBB with suggestion of septal MI. No evidence for anemia - CBC 9/3/2014 was normal. Just started doing some activities with arm and leg exercise for the last 2 weeks, Doing some OT alternating with PT every other day.  CXR, 6/3/2014 - Lungs are clear of infiltrate and costophrenic sulci are sharp.  The cardiomediastinal silhouette appears stable.    PET scan, 4/2014 - 1.A hypermetabolic left pulmonary mass is noted with an SUV of 3.0 maximally.  A neoplastic, infectious, or granulomatous process is on the differential. Clinical correlation is suggested.  Close follow-up for resolution or biopsy would be " "suggested    2.  Elevated right-sided heart pressures are suspected with a large right atrium    3.  Right-sided pleural effusion    4.  Hypermetabolic thyroid gland asymmetrically on the right.  This may relate to thyroiditis, Graves' disease, or neoplastic process.  Ultrasound may be helpful.    5.  Small hypermetabolic focus in the expected location of the left ovary, a female pelvis ultrasound may be helpful for further evaluation.  This could relate to a uterine fibroid that has necrosed or infarcted    Biopsy of lung nodule on 5/1/2014 - negative for CA    CTA, 4/2014 - No evidence of pulmonary thromboembolism.    2.  Enlarged cardiac silhouette and secondary findings of elevated right heart pressures with diffuse mosaic lung pattern and right basilar pleural fluid suggesting cardiac decompensation/heart failure.    3. Unchanged 1 cm nodular density within the left upper lobe which previously demonstrated hypermetabolism by PET/CT and unchanged mediastinal lymphadenopathy.    4.  Subpleural nodular density within the right upper lobe is unchanged when compared with the previous study and could represent an area of remote fibrotic scarring.    5.  Heterogeneous appearance of the spleen, possibly due to the phase of contrast enhancement.  Evolving splenic infarction is not entirely excluded.    6. Suggestion of colonic wall thickening involving the descending colon.  Please correlate for symptoms of colitis.    Since visit of 10/14, rehospitalized for HF on 10/26 to 10/29/2014.  DCS - "Ayla Dela Cruz is a 69 y.o. female admitted to the services of hospital medicine via the ER for acute diastolic heart failure. C/o severe SOB and increase in leg swelling over the past two days. Under the care of Dr. Jurado. Recently was taken off metoprolol. History of paroxsymal atrial fib. Hospitalized here in June in this year for acute CVA. It was at that time, pt started Xarelto. Deficits has resolved. No history of heart " "failure.     Hospital Course: The patient was admitted with acute CHF biventricular and mild elevation of her troponin's at 0.029 - 0.035 and therefore an anginal equivalent was suspected. The patient also had significant hypertension upon admission and although she was not in atrial fibrillation, her EKG showed a significant first degree AV block and RBBB. Discussion was made as to whether or not to decrease the patient flecainide; however due to the risk of her going back into atrial fibrillation this was not done. Upon discharge the patient's lisinopril was increased to 10 mg and aldactone was added."    RHC done HEMODYNAMIC RESULTS:    LVEDP (Pre): 24 mmHg  BP: 166/104    Air rest:  PA: 90/34 (54)  PW:  (24)  RVEDP: 16  RA:  (16)    C. SUMMARY:    1. Diastolic dysfunction.  2. - Kee CO 2.64 L/min  3. - Mean systemic pressure 108.6 mmHG, stage II HTN  4. - SVR 41.16 Wood Units  5. - PVR 11.36 Wood Units  6. - Pulmonary HTN due to left sided heart disease and stage II HTN    D. RECOMMENDATIONS:    1. Routine post-cath care.  2. Cardiac rehab referral.  3. Follow up with Dr. Barrett Jurado.  4. - Aggressive BP lowering and diuresis    Repeat ECHO 11/5/2014 CONCLUSIONS     1 - Concentric remodeling. Septal wall thickness is increased, and posterior wall thickness is mildly increased, with the septum measuring 1.3 cm and the posterior wall measuring 1.1 cm across.    2 - Mildly to moderately depressed left ventricular systolic function (EF 40-45%).     3 - Left ventricular diastolic dysfunction. E/e'(lat) is 16    4 - Right ventricular enlargement with mildly depressed systolic function.     5 - Pulmonary hypertension. estimated PA systolic pressure is 56 mmHg.     6 - Intermediate central venous pressure.     7 - No evidence of intracardiac shunt.     8 - No significant change from Echo of 6/4/2014.    Active problems:  Progressive severe SOB, functional class IV  Diastolic dysfunction, elevated BNP and " "Troponin  Hypokalemia due to diuretics  Secondary Pulmonary HTN with peripheral edema  HTN  History of CVA 6/3/2014  PAF controlled with Flecanide  Marked bradycardia with BB  On OAC  Hematuria    At home receiving PT, OT and speech Rx twice a week, with  nurse once a week, no problem with medications. Feeling better, sleeping well. Home /73.    Since visit of 11/14, feeling "much better", concern of occasional HTN, home BP /66-99, HR . Lot more active, no problem. Not using walker, no CP, SOB, nor dizziness. Recent BMP - normal renal function and K+ 4.1.    Since visit of 12/19/2014, worry about HTN home readings similar to office up to . Morning reading is higher. No HA nor visual abnormalities. Xanax has helped but afraid to use too much. ECG shows sinus arrhythmia, rate of 65, late transition and LAFB. Also bothered with dry cough with the Lisinopril. And started to have return of arm pains that was attributed to atorvastatin, on Crestor. Discussed options.     Since visit of 1/16/2015, noted frequent nocturia, 8-10 times, taking losartan-HCT at night time. Have stopped using Lasix and spironolactone for past 2 months. More active without much problem. Labs normal renal function, K+ 4.2, BNP now 37, A1C 5.6%.    Since visit of 2/18/2015, improving, no further dizziness, some SOB when "stressed", usually helped with alprazolam, use only prn, about 3-6 times weekly. Compliant with medications. Been off Crestor for 6 weeks with concern of muscle problem. Home BP is fine, similar to office.     Since visit of 4/15, appetite returned, feeling a lot better. Active, walking twice a week for about half an hour. Also do calisthenic about twice a week, no problem. Seeing Dr. Zavaleta for generalized pruritis for past 3 months. Cardiac wise less AVILA. Sleeping well. Labs in 5/2015, normal CBC without eosinophilia, thyroid normal, ferritin level low normal at 21. Off Crestor for couple months due to " "fear of muscle pain but did not find much difference being off medication. Last Lipid in 11/2014 was good, on daily dose of Crestor. Home /79.    Since visit of 7/2/2015, feeling "great", very active without problem, no more AVILA nor dizziness with standing. Compliant with medications, don't miss. Using Tylenol 500 mg BID for arthritis. Notice easy bruising on Xarelto. Home /10.  Holter - PREDOMINANT RHYTHM  1. Sinus arrhythmia with heart rates varying between 46 and 110 bpm with an average of 73 bpm.     VENTRICULAR ARRHYTHMIAS  1. There were very rare PVCs recorded totalling 8 and averaging less than 1 per hour.     2. There were no episodes of ventricular tachycardia.    SUPRA VENTRICULAR ARRHYTHMIAS  1. There were very frequent PACs totalling 3054 and averaging 132 per hour.  There were 29 bigeminal cycles.  There were 103 couplets.    2. 3% of complexes.    3. There were no episodes of sustained supraventricular tachycardia.    SINUS NODE FUNCTION  1. There was no evidence of high grade SA khai block.     AV CONDUCTION  1. There was no evidence of high grade AV block.     2. The longest RR interval was 1565 msec.     DIARY  1. The diary was properly completed.     2. There was 1 episode of skipping beats reported. The corresponding rhythm strips revealed the following:             During event 1 the rhythm was sinus rhythm at 75 bpm.     3. There was 1 episode of skipped beat reported. The corresponding rhythm strips revealed the following:             During event 1 the rhythm was sinus arrhythmia at 63 bpm.     MISCELLANEOUS  1. This was a tape of adequate length (23 hrs).     Since visit of 10/15, place on new antidepressant, Venlafaxine 75 mg daily, tried 2 and developed rapid HR to 125 bpm, feels more anxious, now switched to Citalopram. Also noted the daily dose of Lorazepam does not seem adequate. Orthostatic VS is positive with lying 142/73, , standing 120/62, . Been taking " "spironolactone 25 mg for last 3 days. Does feel thirsty. Labs reviewed A1C 5.8%, CBC, BMP were WNL. Lipid shows LDL 99, non-. Goal preferred is <70 and < 100. No problem with 10 mg of Crestor. Had vacation at [place] and [place], walked all over without problem.    Since visit of 1/18/2016, no new problem. 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 Labs with , non-, hsCRP at 2.56.     In 10/2016, had acute GB attack with rupture in 8/2016, then tried on Wellbutrin took only 1-2 tabs and BP up to 201/100 with head tightness. Subsequently back to normal, the last 2.5 days took Losartan bid. Discussed Rx options for HTN. Advised to be more active, regular exercise. Lab reviewed LDL in 8/2016 93.     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 Still with daily palpitations, last up to half hours. Have electronic watch, David Barroso, since 9/2016, suppose harmonize patient with the universe. Feeling better if on during the day. Lot of walking at school, no problem.   Holter in 10/2016 - PREDOMINANT RHYTHM  1. Sinus rhythm with heart rates varying between 52 and 144 bpm with an average of 75 bpm.     2. Paroxysmal atrial fibrillation    VENTRICULAR ARRHYTHMIAS  1. There were occasional mostly monomorphic PVCs totalling 596 and averaging 13 per hour.     2. There were no episodes of ventricular tachycardia.    SUPRA VENTRICULAR ARRHYTHMIAS  1. There were frequent PACs totalling 2982 and averaging 69 per hour.  There were 69 bigeminal cycles.  There were 55 couplets.    2. There were no episodes of sustained supraventricular tachycardia.    3. Paroxysmal atrial fibrillation was noted in the the study.     SINUS NODE FUNCTION  1. There was no evidence of high grade SA khai block.     AV CONDUCTION  1. There was no evidence of high grade AV khai filtering.     2. The longest RR interval was 1725 msec.     DIARY  1. The diary was returned, but not completed    MISCELLANEOUS  1. " "This was a tape of adequate length (43 hrs).     ECHO CONCLUSIONS     1 - Hyperdynamic left ventricular systolic function (EF 65-70%).     2 - Normal left ventricular diastolic function.     3 - Normal right ventricular systolic function .     4 - Pulmonary hypertension. The estimated PA systolic pressure is 64 mmHg.     5 - Less evidence for LVH from Echo in 11/2014.     In 5/2017, bothered by recurrent PAF with AVILA after 10 feet walking. Felt better before on Flecainide 100 mg bid, but Holter continued to show PAF. Attempt up titration, problem with itching, switched to propafenone 150 mg tid, continued with itching and reviewed by allergist, treated with 10 days of cortisone with benefit. No hive and no itching, just don't feel good due to the irregular rhythm. History reviewed, had 2 prior AF ablation, last in [place], [place] in around 2000, recall being told not to do again. Recall seeing an [facility] EP doc but didn't like him. Discussed various options. Will stop antiarrhythmic for now, will be going on a 16 days trip to [place]. Reviewed prior medications, have taken Tenormin, metoprolol tartrate, and short-acting diltiazem in the past without problem. Refer to Dr. Calderon for review. ECG in AF, rate 99, PRWP, LAFB    In 11/2017, post AF ablation, felt good for a few weeks, noted some SOB and had review with Dr. Calderon on 11/1 - 72 year old female with a longstanding history of atrial arrhythmias. Her [id number] Score is 5 (age, female, TIA, HTN) so she should remain on anticoagulation indefinitely. She has failed Flecainide. She is now 6 weeks post-PVI and MA flutter line, and maintaining sinus rhythm on low-dose Amiodarone. Given her intermittent AVILA which started post-ablation, I have stopped Amiodarone which I think is the likely culprit. I instructed her to continue taking Lasix PRN, as well as metoprolol and Xarelto." ECG on 11/1 was in NSR, rate 61, PRWP.  Recommended to stop amiodarone but then started " "to feel the palpitation daily, felt nervous and at the same time being taper down on the nightly Ativan. Did have some breakthrough anxiety attacks. Also had strained the upper back and right arm / shoulder, requesting some Tramadol, tried Jan's Tramadol with good relief. Understand this is an opoid. Used Excedrin with caffeine and bothered by more palpitations.    In 3/2018, here for recurrent palpitations, no inciting factors over the past 6 weeks. Had review with Dr. Calderon in 2/2018 - "72 year old female with a longstanding history of atrial arrhythmias and prior ablations at outside institutions. Her [id number] Score is 5 (age, female, TIA, HTN) so she should remain on anticoagulation indefinitely. She is now 5 months post-PVI and MA flutter line, and maintaining sinus rhythm off Amiodarone. The plan is to continue Xarelto, and to see me again in 6 months." Palpitations appears associated with AVILA, had difficulties walking in house or up a ramp. Started 100 mg of amiodarone last week, daily, feels some benefit. ECG today in Sinus rhythm vs wandering atrial pacemaker with frequent PJC, rate 59. Also taking Losartan in the morning appears more effective.  Holter 11/2017 - PREDOMINANT RHYTHM  1. Sinus arrhythmia with heart rates varying between 39 and 102 bpm with an average of 58 bpm.     VENTRICULAR ARRHYTHMIAS  1. There were occasional PVCs totalling 728 and averaging 15 per hour.  There were 5 bigeminal cycles.     2. There were no episodes of ventricular tachycardia.    SUPRA VENTRICULAR ARRHYTHMIAS  1. There was no supraventricular ectopic activity.    SINUS NODE FUNCTION  1. There was no evidence of high grade SA khai block.     AV CONDUCTION  1. There was no evidence of high grade AV block.     2. The longest RR interval was 1820 msec.     DIARY  1. The diary was not returned    MISCELLANEOUS  1. There were occasional hookup related artifacts. Overall, the study was of good quality.     2. This was a " "tape of adequate length (48 hrs).     in 5/2018, no new problem, still concern of AVILA, usually with walking, variable as little 10 feet or fine with some long walk, can play flute for an hour but find difficulties in holding a note. Off daily amiodarone, uses it prn for palpitation, find helpful. Labs reviewed from 1/2018, TSH, Vitamin D, LDL 68.2, A1C 5.9%, CMP - normal renal function, K+ 3.6. To go to [place] for a month in 8/2018.    In 9/2018, return from a month family reunion trip to [place]. Problem with hypotension with Tramadol and Losartan. Had review with Dr. Calderon on 9/5 "Ayla Dela Cruz is a 73 year old female with a longstanding history of atrial arrhythmias and prior ablations at outside institutions. Her [id number]-QCDe Score is 5 (age, female, TIA, HTN) so she should remain on anticoagulation indefinitely. She is now 11 months post-PVI and MA flutter line, and maintaining sinus rhythm off Amiodarone. However, she is feeling poorly, with frequent PACs and borderline hypotension. The plan is to stop Losartan, echo in next several weeks, Holter monitor in 6 weeks, and follow-up with me in 8 weeks."  Off both medication on 8/31, no problem now. No heart worry. Denies any CP nor SOB. Still teaching. ECG on 9/5 shows NSR with sinus arrhythmia.    HPI comments: in 3/2019, return for follow up, had syncopal spell with hypotension at [employer] required reconstruction of the right ankle, now in PT, doing well. No heart complication, no AF. LDL 90.6 in 1/2019. 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 No heart worries, no CP nor SOB, much better, breathing improved with daily Breo use. Discussed option with NOAC.    ROS    Constitutional: negative for chills, fatigue, fevers, sweats, no further slurred speech, and no dysarthria, appetite improved, intolerance to heat, bruises easily on NOAC and steroid, weight stable since 3/2018.  Eyes: negative for icterus, redness and visual disturbance, " "have visual halo, no more bleed in the right Iris  Respiratory: negative for asthma, cough have resolved off ACE-I, have dyspnea on exertion, occasional SOB with HR 85 to 100 bpm, have lifelong snoring, [place] score 7 improved down to 5, no hemoptysis, pleurisy/chest pain and wheezing  Cardiovascular: negative for chest pain, chest pressure/discomfort, no further orthostatic dizziness, and no palpitations, no paroxysmal nocturnal dyspnea and syncope  Gastrointestinal: negative for abdominal pain, nausea and vomiting, have GERD  Musculoskeletal:positive for arthralgias, and less right sided muscle weakness with improvement in leg strength, improved bilateral ankle pains - OA and no myalgias  Neurological: negative for coordination problems, dizziness, gait problems, headaches, paresthesia, have some tremors but able to draw and no vertigo  Psych: positive for depression, nervousness, anxiety, less stressed due to 's have improved    Objective:    Physical Exam    General appearance: alert, appears stated age, cooperative and no distress, decrease skin turgor.  Head: Normocephalic, without obvious abnormality, atraumatic  Eyes:  conjunctivae/corneas clear. PERRL, EOM's intact.    Neck: no adenopathy, no carotid bruit, no JVD, supple, symmetrical, trachea midline and thyroid not enlarged, symmetric, no tenderness/mass/nodules  Lungs:  clear to auscultation bilaterally  Chest wall: no tenderness  Heart: regular rate and rhythm, normal S1, S2 normal, occasional grade 2/6 systolic murmur, click, rub or gallop    Abdomen: soft, non-tender; bowel sounds normal; no masses,  no organomegaly, waist 31" hip 42"  Extremities: extremities no further weakness of the right upper extremity, atraumatic, no cyanosis and have no edema, minor varicose veins  Pulses: 2+ and symmetric    Assessment:       1. Paroxysmal atrial fibrillation, ablations X 3 1995, 1997, 9/2017, CHADS-VAS score 5, HAS-BLED score 5     2. PAF (paroxysmal " atrial fibrillation)    3. Localized hives    4. Medication intolerance    5. S/P ablation of atrial flutter    6. TIA (transient ischemic attack), 11/3/2014    7. Renal infarct, due to embolism off Eliquis for 7 days    8. Anticoagulant long-term use    9. Elevated AST (SGOT)    10. Iron deficiency anemia due to chronic blood loss         Plan:       Ayla was seen today for follow-up.    Diagnoses and associated orders for this visit:    Paroxysmal atrial fibrillation, ablations X 3 1995, 1997, 9/2017, CHADS-VAS score 5, HAS-BLED score 5   -     edoxaban (SAVAYSA) 60 mg Tab tablet; Take 1 tablet (60 mg total) by mouth once daily.  Dispense: 30 tablet; Refill: 11  -     Ambulatory consult to Electrophysiology    PAF (paroxysmal atrial fibrillation)  -     EKG 12-lead    Localized hives  -     predniSONE (DELTASONE) 20 MG tablet; Take 1 tablet (20 mg total) by mouth once daily.  Dispense: 30 tablet; Refill: 3    Medication intolerance  -     edoxaban (SAVAYSA) 60 mg Tab tablet; Take 1 tablet (60 mg total) by mouth once daily.  Dispense: 30 tablet; Refill: 11  -     predniSONE (DELTASONE) 20 MG tablet; Take 1 tablet (20 mg total) by mouth once daily.  Dispense: 30 tablet; Refill: 3  -     Ambulatory consult to Electrophysiology    S/P ablation of atrial flutter    TIA (transient ischemic attack), 11/3/2014    Renal infarct, due to embolism off Eliquis for 7 days    Anticoagulant long-term use  -     edoxaban (SAVAYSA) 60 mg Tab tablet; Take 1 tablet (60 mg total) by mouth once daily.  Dispense: 30 tablet; Refill: 11    Elevated AST (SGOT)    Iron deficiency anemia due to chronic blood loss  -     Iron and TIBC; Future; Expected date: 01/10/2020  -     Ferritin; Future; Expected date: 01/10/2020      - All medical issues reviewed, continue current Rx.  - CV status stable, off antiarrhythmic, all medications reviewed, patient acknowledge good understanding.  - Recommend using Tylenol as first line pain  medications.  - Instruction for Mediterranean diet and heart healthy exercise given.  - Need good exercise program, 4 key elements: 1. Aerobic (walking, swimming, dancing, jogging, biking, etc, 2. Muscle strengthening / resistance exercise, need to do 2-3 times weekly, 3. Stretching daily, good stretch takes a whole  total minute. 4. Balance exercise daily.  - Highly recommend 30 minutes of exercise daily, can have Sunday off, with 2-3 sessions of muscle strengthening weekly. A  would be very helpful.  - Recommend at least biannual cardiovascular evaluation in view of her significant risk factors, patient 's preference      Chronic pruritus    Depression - improved    Stress at home - improved  - Consider Yoga, Gilberto Chi, or meditation    Elevated brain natriuretic peptide (BNP) level, range 98 - 1045 - improved     Pulmonary hypertension, with right sided congestive heart failure, acute    Chest pain on exertion - resolved    Peripheral edema - imporved    Anxiety - imporved    Patient Active Problem List   Diagnosis    Paroxysmal atrial fibrillation, ablations X 3 1995, 1997, 9/2017, CHADS-VAS score 5, HAS-BLED score 5     Hypertension, 1985    Hyperlipidemia, baseline     Glaucoma (increased eye pressure)    Fibroid uterus    Thickened endometrium    Abnormal CT scan, chest    Interstitial lung disease    H/O: CVA (cerebrovascular accident), June 2014    LVH (left ventricular hypertrophy) due to hypertensive disease    Depression    AVILA (dyspnea on exertion)    OA (osteoarthritis)    Chronic diastolic heart failure, 2014    Microalbuminuria    TIA (transient ischemic attack), 11/3/2014    Chronic pruritus, onset 3/2015    S/P ablation of atrial flutter    JOSE (generalized anxiety disorder)    Other spondylosis, lumbar region    Heart palpitations    Cervical spondylosis    Medication intolerance    Mild persistent asthma without complication    Anticoagulant long-term  use    GERD (gastroesophageal reflux disease)    Renal infarct, due to embolism off Eliquis for 7 days    Mild intermittent asthma without complication    Transaminitis    Body mass index (BMI) 19.9 or less, adult    Localized hives    Elevated AST (SGOT)    Iron deficiency anemia due to chronic blood loss     Total face-to-face time with the patient was 30 minutes and greater than 50% was spent in counseling and coordination of care. The above assessment and plan have been discussed at length. Labs and procedure over the last 6 months reviewed. Problem List updated. Asked to bring in all active medications / pills bottles with next visit.

## 2020-01-10 NOTE — PROGRESS NOTES
" Patient ID:  Ayla Dela Cruz is a 74 y.o. female who presents for {Misc; evaluation/follow-up:42674::"follow-up"} of 6 month f/u      Health literacy: {DESC; LOW/MEDIUM/HIGH:57011} High  Activities: gym 2-3 days a week  Nicotine: never  Alcohol: none  Illicit drugs: none  Cardiac symptoms: none  Home BP:yes, 123/70  Medication compliance: yes  Diet: regular, diabetic, Mediterranean regular  Caffeine: none  Labs:   Last Echo: 09/2017  Last stress test:07/2014   Cardiovascular angiogram:   ECG: 10/2019  Fundoscopic exam:     EPWORTH SLEEPINESS SCALE 1/16/2015   Sitting and reading 0   Watching TV 0   Sitting, inactive in a public place (e.g. a theatre or a meeting) 0   As a passenger in a car for an hour without a break 0   Lying down to rest in the afternoon when circumstances permit 0   Sitting and talking to someone 0   Sitting quietly after a lunch without alcohol 0   In a car, while stopped for a few minutes in traffic 0   Total score 0         HPI    ROS     Objective:    Physical Exam      Assessment:       1. PAF (paroxysmal atrial fibrillation)         Plan:         PAF (paroxysmal atrial fibrillation)  -     EKG 12-lead                "

## 2020-01-15 ENCOUNTER — LAB VISIT (OUTPATIENT)
Dept: LAB | Facility: HOSPITAL | Age: 75
End: 2020-01-15
Attending: INTERNAL MEDICINE
Payer: MEDICARE

## 2020-01-15 DIAGNOSIS — D50.0 IRON DEFICIENCY ANEMIA DUE TO CHRONIC BLOOD LOSS: ICD-10-CM

## 2020-01-15 PROCEDURE — 83540 ASSAY OF IRON: CPT

## 2020-01-15 PROCEDURE — 82728 ASSAY OF FERRITIN: CPT

## 2020-01-15 PROCEDURE — 36415 COLL VENOUS BLD VENIPUNCTURE: CPT | Mod: PO

## 2020-01-16 ENCOUNTER — TELEPHONE (OUTPATIENT)
Dept: CARDIOLOGY | Facility: CLINIC | Age: 75
End: 2020-01-16

## 2020-01-16 DIAGNOSIS — D50.0 IRON DEFICIENCY ANEMIA DUE TO CHRONIC BLOOD LOSS: Primary | ICD-10-CM

## 2020-01-16 LAB
FERRITIN SERPL-MCNC: 20 NG/ML (ref 20–300)
IRON SERPL-MCNC: 50 UG/DL (ref 30–160)
SATURATED IRON: 10 % (ref 20–50)
TOTAL IRON BINDING CAPACITY: 508 UG/DL (ref 250–450)
TRANSFERRIN SERPL-MCNC: 343 MG/DL (ref 200–375)

## 2020-01-16 RX ORDER — FERROUS SULFATE 325(65) MG
325 TABLET ORAL 2 TIMES DAILY
Qty: 60 TABLET | Refills: 11 | Status: ON HOLD | OUTPATIENT
Start: 2020-01-16 | End: 2020-03-10

## 2020-01-20 ENCOUNTER — TELEPHONE (OUTPATIENT)
Dept: OBSTETRICS AND GYNECOLOGY | Facility: CLINIC | Age: 75
End: 2020-01-20

## 2020-01-20 ENCOUNTER — TELEPHONE (OUTPATIENT)
Dept: PULMONOLOGY | Facility: CLINIC | Age: 75
End: 2020-01-20

## 2020-01-20 DIAGNOSIS — Z12.31 OTHER SCREENING MAMMOGRAM: Primary | ICD-10-CM

## 2020-01-20 NOTE — TELEPHONE ENCOUNTER
Mammogram scheduled    ----- Message from Alona Jansen sent at 1/20/2020 11:50 AM CST -----  Contact: patient  Type: Needs Medical Advice    Who Called:  patient  Symptoms (please be specific):    How long has patient had these symptoms:    Pharmacy name and phone #:    Best Call Back Number: [phone]  Additional Information: requesting mark order be placed in epic for scheduling

## 2020-01-20 NOTE — TELEPHONE ENCOUNTER
Spoke to pt. Made sooner apt with Dr Solares.  ----- Message from Alona Jansen sent at 1/20/2020 11:46 AM CST -----  Contact: patient  Type:  Sooner Apoointment Request    Caller is requesting a sooner appointment.  Caller declined first available appointment listed below.  Caller will not accept being placed on the waitlist and is requesting a message be sent to doctor.    Name of Caller:  patient  When is the first available appointment?  03/04  Symptoms:    Best Call Back Number:  [phone]  Additional Information:  Requesting a call back to confirm appointment,patient received recall letter for annual

## 2020-01-22 ENCOUNTER — TELEPHONE (OUTPATIENT)
Dept: FAMILY MEDICINE | Facility: CLINIC | Age: 75
End: 2020-01-22

## 2020-01-22 NOTE — TELEPHONE ENCOUNTER
----- Message from Eve Carlos sent at 1/22/2020  7:59 AM CST -----  Contact: self [phone]   Patient requesting same day appointment due to flu like symptoms.   Please call patient at [phone]. Thanks!

## 2020-01-23 ENCOUNTER — OFFICE VISIT (OUTPATIENT)
Dept: FAMILY MEDICINE | Facility: CLINIC | Age: 75
End: 2020-01-23
Payer: MEDICARE

## 2020-01-23 ENCOUNTER — HOSPITAL ENCOUNTER (OUTPATIENT)
Facility: HOSPITAL | Age: 75
Discharge: HOME | End: 2020-01-24
Attending: EMERGENCY MEDICINE | Admitting: INTERNAL MEDICINE
Payer: MEDICARE

## 2020-01-23 VITALS
HEART RATE: 76 BPM | OXYGEN SATURATION: 97 % | TEMPERATURE: 99 F | BODY MASS INDEX: 20.41 KG/M2 | WEIGHT: 130.06 LBS | DIASTOLIC BLOOD PRESSURE: 84 MMHG | SYSTOLIC BLOOD PRESSURE: 142 MMHG | HEIGHT: 67 IN

## 2020-01-23 DIAGNOSIS — R68.89 FLU-LIKE SYMPTOMS: Primary | ICD-10-CM

## 2020-01-23 DIAGNOSIS — F33.41 RECURRENT MAJOR DEPRESSIVE DISORDER, IN PARTIAL REMISSION: ICD-10-CM

## 2020-01-23 DIAGNOSIS — M48.8X6 OTHER SPECIFIED SPONDYLOPATHIES, LUMBAR REGION: ICD-10-CM

## 2020-01-23 DIAGNOSIS — I50.32 CHRONIC DIASTOLIC HEART FAILURE: ICD-10-CM

## 2020-01-23 DIAGNOSIS — I48.0 PAROXYSMAL ATRIAL FIBRILLATION: ICD-10-CM

## 2020-01-23 DIAGNOSIS — J84.9 INTERSTITIAL LUNG DISEASE: ICD-10-CM

## 2020-01-23 DIAGNOSIS — K52.9 GASTROENTERITIS: ICD-10-CM

## 2020-01-23 DIAGNOSIS — J11.1 FLU SYNDROME: Primary | ICD-10-CM

## 2020-01-23 DIAGNOSIS — A41.9 SEPSIS: ICD-10-CM

## 2020-01-23 DIAGNOSIS — R05.9 COUGH: ICD-10-CM

## 2020-01-23 LAB
ALBUMIN SERPL BCP-MCNC: 3.2 G/DL (ref 3.5–5.2)
ALP SERPL-CCNC: 62 U/L (ref 55–135)
ALT SERPL W/O P-5'-P-CCNC: 9 U/L (ref 10–44)
ANION GAP SERPL CALC-SCNC: 10 MMOL/L (ref 8–16)
AST SERPL-CCNC: 20 U/L (ref 10–40)
BACTERIA #/AREA URNS HPF: NORMAL /HPF
BASOPHILS # BLD AUTO: 0.03 K/UL (ref 0–0.2)
BASOPHILS NFR BLD: 0.3 % (ref 0–1.9)
BILIRUB SERPL-MCNC: 1.7 MG/DL (ref 0.1–1)
BILIRUB UR QL STRIP: NEGATIVE
BUN SERPL-MCNC: 14 MG/DL (ref 8–23)
CALCIUM SERPL-MCNC: 9.2 MG/DL (ref 8.7–10.5)
CHLORIDE SERPL-SCNC: 105 MMOL/L (ref 95–110)
CLARITY UR: CLEAR
CO2 SERPL-SCNC: 23 MMOL/L (ref 23–29)
COLOR UR: YELLOW
CREAT SERPL-MCNC: 0.8 MG/DL (ref 0.5–1.4)
DIFFERENTIAL METHOD: ABNORMAL
EOSINOPHIL # BLD AUTO: 0 K/UL (ref 0–0.5)
EOSINOPHIL NFR BLD: 0.3 % (ref 0–8)
ERYTHROCYTE [DISTWIDTH] IN BLOOD BY AUTOMATED COUNT: 16 % (ref 11.5–14.5)
EST. GFR  (AFRICAN AMERICAN): >60 ML/MIN/1.73 M^2
EST. GFR  (NON AFRICAN AMERICAN): >60 ML/MIN/1.73 M^2
GLUCOSE SERPL-MCNC: 116 MG/DL (ref 70–110)
GLUCOSE UR QL STRIP: NEGATIVE
HCT VFR BLD AUTO: 38.7 % (ref 37–48.5)
HGB BLD-MCNC: 12.5 G/DL (ref 12–16)
HGB UR QL STRIP: ABNORMAL
HYALINE CASTS #/AREA URNS LPF: 0 /LPF
IMM GRANULOCYTES # BLD AUTO: 0.04 K/UL (ref 0–0.04)
INFLUENZA A, MOLECULAR: NEGATIVE
INFLUENZA B, MOLECULAR: NEGATIVE
KETONES UR QL STRIP: ABNORMAL
LACTATE SERPL-SCNC: 1.1 MMOL/L (ref 0.5–2.2)
LACTATE SERPL-SCNC: 1.1 MMOL/L (ref 0.5–2.2)
LEUKOCYTE ESTERASE UR QL STRIP: NEGATIVE
LYMPHOCYTES # BLD AUTO: 0.4 K/UL (ref 1–4.8)
LYMPHOCYTES NFR BLD: 4.3 % (ref 18–48)
MCH RBC QN AUTO: 29.7 PG (ref 27–31)
MCHC RBC AUTO-ENTMCNC: 32.3 G/DL (ref 32–36)
MCV RBC AUTO: 92 FL (ref 82–98)
MICROSCOPIC COMMENT: NORMAL
MONOCYTES # BLD AUTO: 0.9 K/UL (ref 0.3–1)
MONOCYTES NFR BLD: 8.4 % (ref 4–15)
NEUTROPHILS # BLD AUTO: 8.7 K/UL (ref 1.8–7.7)
NEUTROPHILS NFR BLD: 86.3 % (ref 38–73)
NITRITE UR QL STRIP: NEGATIVE
NRBC BLD-RTO: 0 /100 WBC
PH UR STRIP: 6 [PH] (ref 5–8)
PLATELET # BLD AUTO: 208 K/UL (ref 150–350)
PMV BLD AUTO: 9.6 FL (ref 9.2–12.9)
POTASSIUM SERPL-SCNC: 3.9 MMOL/L (ref 3.5–5.1)
PROT SERPL-MCNC: 6.2 G/DL (ref 6–8.4)
PROT UR QL STRIP: ABNORMAL
RBC # BLD AUTO: 4.21 M/UL (ref 4–5.4)
RBC #/AREA URNS HPF: 2 /HPF (ref 0–4)
SODIUM SERPL-SCNC: 138 MMOL/L (ref 136–145)
SP GR UR STRIP: >=1.03 (ref 1–1.03)
SPECIMEN SOURCE: NORMAL
URN SPEC COLLECT METH UR: ABNORMAL
UROBILINOGEN UR STRIP-ACNC: 1 EU/DL
WBC # BLD AUTO: 10.11 K/UL (ref 3.9–12.7)
WBC #/AREA URNS HPF: 0 /HPF (ref 0–5)

## 2020-01-23 PROCEDURE — 25000003 PHARM REV CODE 250: Performed by: EMERGENCY MEDICINE

## 2020-01-23 PROCEDURE — 99285 EMERGENCY DEPT VISIT HI MDM: CPT | Mod: 25

## 2020-01-23 PROCEDURE — 81000 URINALYSIS NONAUTO W/SCOPE: CPT

## 2020-01-23 PROCEDURE — 1101F PR PT FALLS ASSESS DOC 0-1 FALLS W/OUT INJ PAST YR: ICD-10-PCS | Mod: S$GLB,,, | Performed by: NURSE PRACTITIONER

## 2020-01-23 PROCEDURE — 96361 HYDRATE IV INFUSION ADD-ON: CPT | Performed by: EMERGENCY MEDICINE

## 2020-01-23 PROCEDURE — 36415 COLL VENOUS BLD VENIPUNCTURE: CPT

## 2020-01-23 PROCEDURE — 80053 COMPREHEN METABOLIC PANEL: CPT

## 2020-01-23 PROCEDURE — 3079F PR MOST RECENT DIASTOLIC BLOOD PRESSURE 80-89 MM HG: ICD-10-PCS | Mod: S$GLB,,, | Performed by: NURSE PRACTITIONER

## 2020-01-23 PROCEDURE — 93005 ELECTROCARDIOGRAM TRACING: CPT

## 2020-01-23 PROCEDURE — 63600175 PHARM REV CODE 636 W HCPCS: Performed by: EMERGENCY MEDICINE

## 2020-01-23 PROCEDURE — 99999 PR PBB SHADOW E&M-EST. PATIENT-LVL IV: ICD-10-PCS | Mod: PBBFAC,,, | Performed by: NURSE PRACTITIONER

## 2020-01-23 PROCEDURE — 3079F DIAST BP 80-89 MM HG: CPT | Mod: S$GLB,,, | Performed by: NURSE PRACTITIONER

## 2020-01-23 PROCEDURE — 1101F PT FALLS ASSESS-DOCD LE1/YR: CPT | Mod: S$GLB,,, | Performed by: NURSE PRACTITIONER

## 2020-01-23 PROCEDURE — 87040 BLOOD CULTURE FOR BACTERIA: CPT | Mod: 59

## 2020-01-23 PROCEDURE — 1126F PR PAIN SEVERITY QUANTIFIED, NO PAIN PRESENT: ICD-10-PCS | Mod: S$GLB,,, | Performed by: NURSE PRACTITIONER

## 2020-01-23 PROCEDURE — 99999 PR PBB SHADOW E&M-EST. PATIENT-LVL IV: CPT | Mod: PBBFAC,,, | Performed by: NURSE PRACTITIONER

## 2020-01-23 PROCEDURE — 25000003 PHARM REV CODE 250: Performed by: INTERNAL MEDICINE

## 2020-01-23 PROCEDURE — 83605 ASSAY OF LACTIC ACID: CPT

## 2020-01-23 PROCEDURE — 83605 ASSAY OF LACTIC ACID: CPT | Mod: 91

## 2020-01-23 PROCEDURE — G0378 HOSPITAL OBSERVATION PER HR: HCPCS

## 2020-01-23 PROCEDURE — 3077F PR MOST RECENT SYSTOLIC BLOOD PRESSURE >= 140 MM HG: ICD-10-PCS | Mod: S$GLB,,, | Performed by: NURSE PRACTITIONER

## 2020-01-23 PROCEDURE — 99214 PR OFFICE/OUTPT VISIT, EST, LEVL IV, 30-39 MIN: ICD-10-PCS | Mod: S$GLB,,, | Performed by: NURSE PRACTITIONER

## 2020-01-23 PROCEDURE — 85025 COMPLETE CBC W/AUTO DIFF WBC: CPT

## 2020-01-23 PROCEDURE — 3077F SYST BP >= 140 MM HG: CPT | Mod: S$GLB,,, | Performed by: NURSE PRACTITIONER

## 2020-01-23 PROCEDURE — 99214 OFFICE O/P EST MOD 30 MIN: CPT | Mod: S$GLB,,, | Performed by: NURSE PRACTITIONER

## 2020-01-23 PROCEDURE — 1159F MED LIST DOCD IN RCRD: CPT | Mod: S$GLB,,, | Performed by: NURSE PRACTITIONER

## 2020-01-23 PROCEDURE — 1126F AMNT PAIN NOTED NONE PRSNT: CPT | Mod: S$GLB,,, | Performed by: NURSE PRACTITIONER

## 2020-01-23 PROCEDURE — 1159F PR MEDICATION LIST DOCUMENTED IN MEDICAL RECORD: ICD-10-PCS | Mod: S$GLB,,, | Performed by: NURSE PRACTITIONER

## 2020-01-23 PROCEDURE — 63600175 PHARM REV CODE 636 W HCPCS: Performed by: INTERNAL MEDICINE

## 2020-01-23 PROCEDURE — 87502 INFLUENZA DNA AMP PROBE: CPT

## 2020-01-23 PROCEDURE — 96361 HYDRATE IV INFUSION ADD-ON: CPT

## 2020-01-23 RX ORDER — ACETAMINOPHEN 500 MG
1000 TABLET ORAL
Status: DISCONTINUED | OUTPATIENT
Start: 2020-01-23 | End: 2020-01-23

## 2020-01-23 RX ORDER — BENZONATATE 100 MG/1
100 CAPSULE ORAL 3 TIMES DAILY PRN
Status: DISCONTINUED | OUTPATIENT
Start: 2020-01-23 | End: 2020-01-24 | Stop reason: HOSPADM

## 2020-01-23 RX ORDER — DABIGATRAN ETEXILATE 150 MG/1
150 CAPSULE ORAL 2 TIMES DAILY
COMMUNITY
End: 2020-03-09

## 2020-01-23 RX ORDER — METOPROLOL TARTRATE 50 MG/1
50 TABLET ORAL 2 TIMES DAILY
Status: DISCONTINUED | OUTPATIENT
Start: 2020-01-23 | End: 2020-01-24 | Stop reason: HOSPADM

## 2020-01-23 RX ORDER — BENZONATATE 100 MG/1
100 CAPSULE ORAL 3 TIMES DAILY PRN
Qty: 30 CAPSULE | Refills: 1 | Status: SHIPPED | OUTPATIENT
Start: 2020-01-23 | End: 2020-01-30

## 2020-01-23 RX ORDER — SODIUM CHLORIDE 9 MG/ML
INJECTION, SOLUTION INTRAVENOUS CONTINUOUS
Status: DISCONTINUED | OUTPATIENT
Start: 2020-01-23 | End: 2020-01-24 | Stop reason: HOSPADM

## 2020-01-23 RX ORDER — PANTOPRAZOLE SODIUM 40 MG/1
40 TABLET, DELAYED RELEASE ORAL DAILY
Status: DISCONTINUED | OUTPATIENT
Start: 2020-01-24 | End: 2020-01-23

## 2020-01-23 RX ORDER — FLUTICASONE PROPIONATE 50 MCG
1 SPRAY, SUSPENSION (ML) NASAL DAILY
Status: DISCONTINUED | OUTPATIENT
Start: 2020-01-24 | End: 2020-01-24 | Stop reason: HOSPADM

## 2020-01-23 RX ORDER — ONDANSETRON 4 MG/1
4 TABLET, ORALLY DISINTEGRATING ORAL
Status: COMPLETED | OUTPATIENT
Start: 2020-01-23 | End: 2020-01-23

## 2020-01-23 RX ORDER — FLUTICASONE FUROATE AND VILANTEROL 200; 25 UG/1; UG/1
1 POWDER RESPIRATORY (INHALATION) DAILY
Status: DISCONTINUED | OUTPATIENT
Start: 2020-01-24 | End: 2020-01-24

## 2020-01-23 RX ORDER — GABAPENTIN 300 MG/1
300 CAPSULE ORAL 3 TIMES DAILY
Status: DISCONTINUED | OUTPATIENT
Start: 2020-01-23 | End: 2020-01-24 | Stop reason: HOSPADM

## 2020-01-23 RX ORDER — PROMETHAZINE HYDROCHLORIDE 12.5 MG/1
12.5 SUPPOSITORY RECTAL EVERY 6 HOURS PRN
Status: DISCONTINUED | OUTPATIENT
Start: 2020-01-23 | End: 2020-01-24 | Stop reason: HOSPADM

## 2020-01-23 RX ORDER — OSELTAMIVIR PHOSPHATE 75 MG/1
75 CAPSULE ORAL 2 TIMES DAILY
Qty: 10 CAPSULE | Refills: 0 | Status: SHIPPED | OUTPATIENT
Start: 2020-01-23 | End: 2020-01-30

## 2020-01-23 RX ORDER — LORAZEPAM 0.5 MG/1
0.5 TABLET ORAL NIGHTLY PRN
COMMUNITY
End: 2021-02-04 | Stop reason: CLARIF

## 2020-01-23 RX ORDER — DORZOLAMIDE HYDROCHLORIDE AND TIMOLOL MALEATE 20; 5 MG/ML; MG/ML
1 SOLUTION/ DROPS OPHTHALMIC 2 TIMES DAILY
Status: DISCONTINUED | OUTPATIENT
Start: 2020-01-23 | End: 2020-01-24 | Stop reason: HOSPADM

## 2020-01-23 RX ORDER — PANTOPRAZOLE SODIUM 40 MG/1
40 TABLET, DELAYED RELEASE ORAL DAILY
Status: DISCONTINUED | OUTPATIENT
Start: 2020-01-24 | End: 2020-01-24 | Stop reason: HOSPADM

## 2020-01-23 RX ORDER — DABIGATRAN ETEXILATE 75 MG/1
150 CAPSULE ORAL 2 TIMES DAILY
Status: DISCONTINUED | OUTPATIENT
Start: 2020-01-23 | End: 2020-01-24 | Stop reason: SDUPTHER

## 2020-01-23 RX ORDER — FLUTICASONE FUROATE AND VILANTEROL 200; 25 UG/1; UG/1
1 POWDER RESPIRATORY (INHALATION) DAILY
Status: DISCONTINUED | OUTPATIENT
Start: 2020-01-24 | End: 2020-01-23 | Stop reason: SDUPTHER

## 2020-01-23 RX ORDER — ACETAMINOPHEN 650 MG/20.3ML
1000 LIQUID ORAL ONCE
Status: COMPLETED | OUTPATIENT
Start: 2020-01-23 | End: 2020-01-23

## 2020-01-23 RX ADMIN — ACETAMINOPHEN ORAL SOLUTION 999.01 MG: 650 SOLUTION ORAL at 05:01

## 2020-01-23 RX ADMIN — METOPROLOL TARTRATE 50 MG: 50 TABLET ORAL at 09:01

## 2020-01-23 RX ADMIN — DORZOLAMIDE HYDROCHLORIDE AND TIMOLOL MALEATE 1 DROP: 20; 5 SOLUTION/ DROPS OPHTHALMIC at 09:01

## 2020-01-23 RX ADMIN — SODIUM CHLORIDE 1770 ML: 0.9 INJECTION, SOLUTION INTRAVENOUS at 05:01

## 2020-01-23 RX ADMIN — ONDANSETRON 4 MG: 4 TABLET, ORALLY DISINTEGRATING ORAL at 05:01

## 2020-01-23 RX ADMIN — SODIUM CHLORIDE: 0.9 INJECTION, SOLUTION INTRAVENOUS at 09:01

## 2020-01-23 NOTE — ED PROVIDER NOTES
Encounter Date: 1/23/2020    SCRIBE #1 NOTE: I, Heydi Roland, am scribing for, and in the presence of, Julius Wolff MD.       History     Chief Complaint   Patient presents with    Generalized Body Aches     cough, fever since 1/19/2020       Time seen by provider: 4:30 PM on 01/23/2020    Ayla Dela Cruz is a 74 y.o. female who presents to the ED with an onset of generalized body aches, nausea, and fever. Pt began experiencing generalized body aches 3 days ago, HA and sinus pressure began the following day, and nausea this morning, dry heaving but unable to vomit. She saw NP this morning and was referred to ED. Pt also presents with 102F fever, productive cough, sneezing, chills, diarrhea, and abdominal pain but denies wheezing, runny nose, urinary symptoms, rashes or any other symptoms at this time. She has been taking previously prescribed Phenergan suppositories. PMHx of COPD, asthma, HTN, and HLD. No pertinent PSHx. Current medications include Pradaxa and Breo. Drug allergies include Xarelto, Bactrim, Atorvastatin, Augmentin, Baclofen, Ciprofloxacin, Erythromycin, Flecainide, Fluoxetine, Lisinopril, Losartan, Morphine, Tramadol, Venlafaxine analogues, Afrin, and Tizanidine.     The history is provided by the patient and the spouse.     Review of patient's allergies indicates:   Allergen Reactions    Xarelto [rivaroxaban] Rash    Bactrim [sulfamethoxazole-trimethoprim] Other (See Comments)     Upset stomach, dry heaves, confusion    Atorvastatin      Other reaction(s): Joint pain    Augmentin [amoxicillin-pot clavulanate]      Other reaction(s): Mental Status Change    Baclofen (bulk) Nausea And Vomiting    Ciprofloxacin (bulk)     Decongest tabs      Other reaction(s): increased heart rate    Erythromycin Other (See Comments)    Flecainide Hives     And SOB. No reaction to Lidocaine     Fluoxetine      Other reaction(s): heart palpitations  Other reaction(s): anxiety    Lisinopril Other  (See Comments)     cough    Losartan Other (See Comments)     Hypotension with lightheadedness    Morphine Other (See Comments)     confusion    Tramadol Other (See Comments)     SOB, low BP    Venlafaxine analogues      Changes in BP and increases heart rate       Afrin [oxymetazoline] Palpitations    Caffeine Palpitations    Tizanidine Anxiety     dizziness     Past Medical History:   Diagnosis Date    Anticoagulant long-term use     Anxiety     Arthritis     Atrial fibrillation     Cancer     skin    CHF (congestive heart failure)     Depression     DVT (deep venous thrombosis)     GERD (gastroesophageal reflux disease)     Glaucoma (increased eye pressure)     Hyperlipidemia     diet controlled    Hypertension     Interstitial lung disease     Localized hives 1/10/2020    Mild persistent asthma without complication 11/12/2018    Pneumonia 1/31/2014    Stroke 6-3-14    Stroke     TIA (transient ischemic attack)     TIA (transient ischemic attack)      Past Surgical History:   Procedure Laterality Date    2 heart ablations      3 in total    bilateral cataracts      CHOLECYSTECTOMY      COLONOSCOPY N/A 1/19/2017    Procedure: COLONOSCOPY and EGD;  Surgeon: Jonathan Schultz MD;  Location: [facility];  Service: Endoscopy;  Laterality: N/A;    COLONOSCOPY N/A 10/10/2019    Procedure: COLONOSCOPY;  Surgeon: Alex Christian MD;  Location: [facility];  Service: Endoscopy;  Laterality: N/A;    ESOPHAGOGASTRODUODENOSCOPY N/A 10/14/2019    Procedure: EGD (ESOPHAGOGASTRODUODENOSCOPY);  Surgeon: Alex Christian MD;  Location: [facility];  Service: Endoscopy;  Laterality: N/A;    INJECTION OF ANESTHETIC AGENT AROUND MEDIAL BRANCH NERVES INNERVATING CERVICAL FACET JOINT Right 6/7/2018    Procedure: BLOCK, NERVE, FACET JOINT, MEDIAL BRANCH, CERVICAL;  Surgeon: Galo Clancy MD;  [facility];  Service: Pain Management;  Laterality: Right;  C4, 5, 6    OPEN REDUCTION AND INTERNAL FIXATION  (ORIF) OF INJURY OF ANKLE Right 11/1/2018    Procedure: ORIF, ANKLE;  Surgeon: Wilfredo Aguero MD;  Location: [facility] OR;  Service: Orthopedics;  Laterality: Right;    pyloristenosis      RADIOFREQUENCY ABLATION OF LUMBAR MEDIAL BRANCH NERVE AT SINGLE LEVEL Bilateral 9/21/2018    Procedure: RADIOFREQUENCY ABLATION, NERVE, SPINAL, LUMBAR, MEDIAL BRANCH, 1 LEVEL;  Surgeon: Galo Clancy MD;  Location: [facility] OR;  Service: Pain Management;  Laterality: Bilateral;  L3, 4, 5    RADIOFREQUENCY ABLATION OF LUMBAR MEDIAL BRANCH NERVE AT SINGLE LEVEL Bilateral 2/19/2019    Procedure: Radiofrequency Ablation, Nerve, Spinal, Lumbar, Medial Branch, 1 Level;  Surgeon: Galo Clancy MD;  Location: [facility] OR;  Service: Pain Management;  Laterality: Bilateral;  L3, 4, 5     RADIOFREQUENCY THERMAL COAGULATION OF MEDIAL BRANCH OF POSTERIOR RAMUS OF CERVICAL SPINAL NERVE Right 7/3/2018    Procedure: RADIOFREQUENCY THERMAL COAGULATION, NERVE, SPINAL, CERVICAL, MEDIAL BRANCH OF POSTERIOR RAMUS;  Surgeon: Galo Clancy MD;  Location: [facility] OR;  Service: Pain Management;  Laterality: Right;  C4,5,6 - Burned at 80 degrees C. for 75 seconds each site    RADIOFREQUENCY THERMAL COAGULATION OF MEDIAL BRANCH OF POSTERIOR RAMUS OF CERVICAL SPINAL NERVE Right 7/23/2019    Procedure: RADIOFREQUENCY THERMAL COAGULATION, NERVE, SPINAL, CERVICAL, POSTERIOR RAMUS, MEDIAL BRANCH;  Surgeon: Galo Clancy MD;  Location: [facility] OR;  Service: Pain Management;  Laterality: Right;  C4,5,6    RADIOFREQUENCY THERMOCOAGULATION Bilateral 9/10/2019    Procedure: RADIOFREQUENCY THERMAL COAGULATION LUMBAR;  Surgeon: Galo Clancy MD;  Location: [facility];  Service: Pain Management;  Laterality: Bilateral;  L3,4,5 - Burned at 80 degrees C. for 60 seconds x 2 each site    skin cancer removal       TONSILLECTOMY       Family History   Problem Relation Age of Onset    Heart disease Father     Ulcers Father     Arthritis Mother     Asthma Mother     Rheum arthritis Mother      Pneumonia Mother     Depression Son     Alzheimer's disease Maternal Uncle     Rheum arthritis Maternal Grandmother     Emphysema Maternal Grandfather     Cancer Maternal Grandfather         kidney    Kidney disease Maternal Grandfather     Cancer Paternal Grandmother         lung= smoker    Pneumonia Paternal Grandfather     Breast cancer Neg Hx     Ovarian cancer Neg Hx      Social History     Tobacco Use    Smoking status: Never Smoker    Smokeless tobacco: Never Used   Substance Use Topics    Alcohol use: No     Frequency: Monthly or less     Drinks per session: 1 or 2     Binge frequency: Never    Drug use: No     Review of Systems   Constitutional: Positive for chills and fever.   HENT: Positive for sinus pressure and sneezing. Negative for rhinorrhea and sore throat.    Respiratory: Positive for cough. Negative for shortness of breath and wheezing.    Cardiovascular: Negative for chest pain.   Gastrointestinal: Positive for abdominal pain, diarrhea and nausea. Negative for vomiting.   Genitourinary: Negative for difficulty urinating, dysuria and hematuria.   Musculoskeletal: Positive for myalgias.   Skin: Negative for rash.   Neurological: Positive for headaches. Negative for weakness.   Hematological: Bruises/bleeds easily.   All other systems reviewed and are negative.      Physical Exam     Initial Vitals [01/23/20 1621]   BP Pulse Resp Temp SpO2   (!) 142/87 (!) 115 16 (!) 102.7 °F (39.3 °C) 97 %      MAP       --         Physical Exam    Nursing note and vitals reviewed.  Constitutional: She appears well-developed and well-nourished. She is not diaphoretic. No distress.   Pyrexia. Appears nauseated.  Mildly ill-appearing.   HENT:   Head: Normocephalic and atraumatic.   Eyes: Pupils are equal, round, and reactive to light.   Surgical changes to right eye.    Neck: Normal range of motion. Neck supple.   Cardiovascular: Normal rate, regular rhythm and normal heart sounds. Exam reveals no  gallop and no friction rub.    No murmur heard.  Pulmonary/Chest: Breath sounds normal. No respiratory distress. She has no wheezes. She has no rhonchi. She has no rales.   Abdominal: There is tenderness in the left upper quadrant.   Minimal left upper abdominal tenderness.   Musculoskeletal: Normal range of motion.   Neurological: She is alert and oriented to person, place, and time.   Skin: Skin is warm and dry.   Psychiatric: She has a normal mood and affect. Her behavior is normal. Judgment and thought content normal.         ED Course   Procedures  Labs Reviewed   CBC W/ AUTO DIFFERENTIAL - Abnormal; Notable for the following components:       Result Value    RDW 16.0 (*)     Gran # (ANC) 8.7 (*)     Lymph # 0.4 (*)     Gran% 86.3 (*)     Lymph% 4.3 (*)     All other components within normal limits   COMPREHENSIVE METABOLIC PANEL - Abnormal; Notable for the following components:    Glucose 116 (*)     Albumin 3.2 (*)     Total Bilirubin 1.7 (*)     ALT 9 (*)     All other components within normal limits   URINALYSIS, REFLEX TO URINE CULTURE - Abnormal; Notable for the following components:    Specific Gravity, UA >=1.030 (*)     Protein, UA 2+ (*)     Ketones, UA 3+ (*)     Occult Blood UA Trace (*)     All other components within normal limits    Narrative:     Preferred Collection Type->Urine, Clean Catch   INFLUENZA A & B BY MOLECULAR   CULTURE, BLOOD   CULTURE, BLOOD   LACTIC ACID, PLASMA   URINALYSIS MICROSCOPIC    Narrative:     Preferred Collection Type->Urine, Clean Catch   LACTIC ACID, PLASMA          Imaging Results          X-Ray Chest AP Portable (Final result)  Result time 01/23/20 17:38:34    Final result by Ruby Crawford MD (01/23/20 17:38:34)                 Impression:      Mild chronic appearing interstitial changes with no confluent infiltrate or significant change compared to the prior exam      Electronically signed by: Ruby Crawford MD  Date:    01/23/2020  Time:    17:38              Narrative:    EXAMINATION:  XR CHEST AP PORTABLE    CLINICAL HISTORY:  Sepsis;    TECHNIQUE:  Single frontal view of the chest was performed.    COMPARISON:  10/21/2018    FINDINGS:  The cardiomediastinal silhouette is stable.  There is mild interstitial prominence perihilar and basilar.  No consolidation.  No pleural effusion.  No significant change                                 Medical Decision Making:   History:   Old Medical Records: I decided to obtain old medical records.  Independently Interpreted Test(s):   I have ordered and independently interpreted EKG Reading(s) - see prior notes  Clinical Tests:   Lab Tests: Ordered and Reviewed  Radiological Study: Ordered and Reviewed  Medical Tests: Ordered and Reviewed            Scribe Attestation:   Scribe #1: I performed the above scribed service and the documentation accurately describes the services I performed. I attest to the accuracy of the note.    I, Dr. Wolff, personally performed the services described in this documentation. All medical record entries made by the scribe were at my direction and in my presence.  I have reviewed the chart and agree that the record reflects my personal performance and is accurate and complete.6:49 PM 01/23/2020            ED Course as of Jan 23 1829   Thu Jan 23, 2020   1646 BP(!): 142/87 [EF]   1646 Temp(!): 102.7 °F (39.3 °C) [EF]   1646 Temp src: Oral [EF]   1646 Pulse(!): 115 [EF]   1646 Resp: 16 [EF]   1646 SpO2: 97 % [EF]   1713 Sinus tachycardia 101 beats per minute left axis no ST elevation or depression or T-wave inversion no change from prior EKGs.  PAC.  Independently interpreted.  This    [EF]   1747 Influenza A, Molecular: Negative [EF]   1747 Flu A & B Source: Nasal swab [EF]   1747 Influenza B, Molecular: Negative [EF]   1748 Total bilirubin consistent with prior    [EF]   1800 Lactate, Jasvir: 1.1 [EF]   1815 Leukocytes, UA: Negative [EF]   1815 NITRITE UA: Negative [EF]   1816 No evidence of a UTI    Bacteria, UA: None [EF]   1825 Dr. Deal to admit    [EF]   1826 74-year-old female history of AFib hypertension high cholesterol COPD presents to the ER with fever since Monday.  Also diarrhea and vomiting for several days.  Febrile in the emergency room which has improved with Tylenol.  Urine is concentrated, metabolic panel demonstrates no significant acute abnormality.  Feels better and looks better at this time after fluids Zofran and Tylenol but given her age in the duration of symptoms I think she needs to be admitted to the hospital.  Abdominal exam is benign.   [EF]      ED Course User Index  [EF] Julius Wolff MD                Clinical Impression:       ICD-10-CM ICD-9-CM   1. Sepsis A41.9 038.9     995.91   2. Gastroenteritis K52.9 558.9         Disposition:   Disposition: Placed in Observation                     Julius Wolff MD  01/23/20 1323

## 2020-01-23 NOTE — PATIENT INSTRUCTIONS
The Flu (Influenza)     The virus that causes the flu spreads through the air in droplets when someone who has the flu coughs, sneezes, laughs, or talks.   The flu (influenza) is an infection that affects your respiratory tract. This tract is made up of your mouth, nose, and lungs, and the passages between them. Unlike a cold, the flu can make you very ill. And it can lead to pneumonia, a serious lung infection. The flu can have serious complications and even cause death.  Who is at risk for the flu?  Anyone can get the flu. But you are more likely to become infected if you:  · Have a weakened immune system  · Work in a healthcare setting where you may be exposed to flu germs  · Live or work with someone who has the flu  · Havent had an annual flu shot  How does the flu spread?  The flu is caused by a virus. The virus spreads through the air in droplets when someone who has the flu coughs, sneezes, laughs, or talks. You can become infected when you inhale these viruses directly. You can also become infected when you touch a surface on which the droplets have landed and then transfer the germs to your eyes, nose, or mouth. Touching used tissues, or sharing utensils, drinking glasses, or a toothbrush from an infected person can expose you to flu viruses, too.  What are the symptoms of the flu?  Flu symptoms tend to come on quickly and may last a few days to a few weeks. They include:  · Fever usually higher than 100.4°F  (38°C) and chills  · Sore throat and headache  · Dry cough  · Runny nose  · Tiredness and weakness  · Muscle aches  Who is at risk for flu complications?  For some people, the flu can be very serious. The risk for complications is greater for:  · Children younger than age 5  · Adults ages 65 and older  · People with a chronic illness such as diabetes or heart, kidney, or lung disease  · People who live in a nursing home or long-term care facility   How is the flu treated?  The flu usually gets  better after 7 days or so. In some cases, your healthcare provider may prescribe an antiviral medicine. This may help you get well a little sooner. For the medicine to help, you need to take it as soon as possible (ideally within 48 hours) after your symptoms start. If you develop pneumonia or other serious illness, you may need to stay in the hospital.  Easing flu symptoms  · Drink lots of fluids such as water, juice, and warm soup. A good rule is to drink enough so that you urinate your normal amount.  · Get plenty of rest.  · Ask your healthcare provider what to take for fever and pain.  · Call your provider if your fever is 100.4°F (38°C) or higher, or you become dizzy, lightheaded, or short of breath.  Taking steps to protect others  · Wash your hands often, especially after coughing or sneezing. Or clean your hands with an alcohol-based hand  containing at least 60% alcohol.  · Cough or sneeze into a tissue. Then throw the tissue away and wash your hands. If you dont have a tissue, cough and sneeze into your elbow.  · Stay home until at least 24 hours after you no longer have a fever or chills. Be sure the fever isnt being hidden by fever-reducing medicine.  · Dont share food, utensils, drinking glasses, or a toothbrush with others.  · Ask your healthcare provider if others in your household should get antiviral medicine to help them avoid infection.  How can the flu be prevented?  · One of the best ways to avoid the flu is to get a flu vaccine each year. The virus that causes the flu changes from year to year. For that reason, healthcare providers recommend getting the flu vaccine each year, as soon as it's available in your area. The vaccine is given as a shot. Your healthcare provider can tell you which vaccine is right for you. A nasal spray is also available but is not recommended for the 6879-7512 flu season. The CDC says this is because the nasal spray did not seem to protect against the flu  over the last several flu seasons. In the past, it was meant for people ages 2 to 49.  · Wash your hands often. Frequent handwashing is a proven way to help prevent infection.  · Carry an alcohol-based hand gel containing at least 60% alcohol. Use it when you can't use soap and water. Then wash your hands as soon as you can.  · Avoid touching your eyes, nose, and mouth.  · At home and work, clean phones, computer keyboards, and toys often with disinfectant wipes.  · If possible, avoid close contact with others who have the flu or symptoms of the flu.  Handwashing tips  Handwashing is one of the best ways to prevent many common infections. If you are caring for or visiting someone with the flu, wash your hands each time you enter and leave the room. Follow these steps:  · Use warm water and plenty of soap. Rub your hands together well.  · Clean the whole hand, including under your nails, between your fingers, and up the wrists.  · Wash for at least 15 seconds.  · Rinse, letting the water run down your fingers, not up your wrists.  · Dry your hands well. Use a paper towel to turn off the faucet and open the door.  Using alcohol-based hand   Alcohol-based hand  are also a good choice. Use them when you can't use soap and water. Follow these steps:  · Squeeze about a tablespoon of gel into the palm of one hand.  · Rub your hands together briskly, cleaning the backs of your hands, the palms, between your fingers, and up the wrists.  · Rub until the gel is gone and your hands are completely dry.  Preventing the flu in healthcare settings  The flu is a special concern for people in hospitals and long-term care facilities. To help prevent the spread of flu, many hospitals and nursing homes take these steps:  · Healthcare providers wash their hands or use an alcohol-based hand  before and after treating each patient.  · People with the flu have private rooms and bathrooms or share a room with someone  with the same infection.  · People who are at high risk for the flu but don't have it are encouraged to get the flu and pneumonia vaccines.  · All healthcare workers are encouraged or required to get flu shots.   Date Last Reviewed: 12/1/2016  © 6892-9733 [employer] [address] [place] [id number]. All rights reserved. This information is not intended as a substitute for professional medical care. Always follow your healthcare professional's instructions.        Adult Self-Care for Colds  Colds are caused by viruses. They can't be cured with antibiotics. However, you can ease symptoms and support your body's efforts to heal itself.  No matter which symptoms you have, be sure to:  · Drink plenty of fluids (water or clear soup)  · Stop smoking and drinking alcohol  · Get plenty of rest    Understand a fever  · Take your temperature several times a day. If your fever is 100.4°F (38.0°C) for more than a day, call your healthcare provider.  · Relax, lie down. Go to bed if you want. Just get off your feet and rest. Also, drink plenty of fluids to avoid dehydration.  · Take acetaminophen or a nonsteroidal anti-inflammatory agent (NSAID), such as ibuprofen.  Treat a troubled nose kindly  · Breathe steam or heated humidified air to open blocked nasal passages.  a hot shower or use a vaporizer. Be careful not to get burned by the steam.  · Saline nasal sprays and decongestant tablets help open a stuffy nose. Antihistamines can also help, but they can cause side effects such as drowsiness and drying of the eyes, nose, and mouth.  Soothe a sore throat and cough  · Gargle every 2 hours with 1/4 teaspoon of salt dissolved in 1/2 cup of warm water. Suck on throat lozenges and cough drops to moisten your throat.  · Cough medicines are available but it is unclear how well they actually work.  · Take acetaminophen or an NSAID, such as ibuprofen, to ease throat pain  Ease digestive problems  · Put  fluids back into your body. Take frequent sips of clear liquids such as water or broth. Avoid drinks that have a lot of sugar in them, such as juices and sodas. These can make diarrhea worse. Older children and adults can drink sports drinks.  · As your appetite returns, you can resume your normal diet. Ask your healthcare provider if there are any foods you should avoid.  When to seek medical care  When you first notice symptoms, ask your healthcare provider if antiviral medicines are appropriate. Antibiotics should not be taken for colds or flu. Also, call your healthcare provider if you have any of the following symptoms or if you aren't feeling better after 7 days:  · Shortness of breath  · Pain or pressure in the chest or belly (abdomen)  · Worsening symptoms, especially after a period of improvement  · Fever of 100.4°F  (38.0°C) or higher, or fever that doesn't go down with medicine  · Sudden dizziness or confusion  · Severe or continued vomiting  · Signs of dehydration, including extreme thirst, dark urine, infrequent urination, dry mouth  · Spotted, red, or very sore throat   Date Last Reviewed: 12/1/2016  © 1887-3425 [employer]. [address] [place] [address], [place] [address]. All rights reserved. This information is not intended as a substitute for professional medical care. Always follow your healthcare professional's instructions.

## 2020-01-23 NOTE — ED TRIAGE NOTES
Pt reports fever since 1/19 with cough/congestion and n/v/d. Pt reports fever relieved with tylenol but it comes back. Reports generalized body and decreased oral intake.

## 2020-01-23 NOTE — PROGRESS NOTES
Subjective:       Patient ID: Ayla Dela Cruz is a 74 y.o. female.    Chief Complaint: Cough    Cough   This is a new problem. The current episode started in the past 7 days. The problem has been rapidly improving. The problem occurs constantly. The cough is productive of sputum. Associated symptoms include chills, a fever, headaches, myalgias, rhinorrhea, shortness of breath and wheezing. Pertinent negatives include no chest pain, ear congestion, ear pain, rash or sore throat. She has tried steroid inhaler for the symptoms. The treatment provided mild relief. Her past medical history is significant for COPD.   Fever    This is a new problem. The current episode started yesterday. The problem has been gradually worsening. The maximum temperature noted was 102 to 102.9 F. The temperature was taken using an oral thermometer. Associated symptoms include coughing, headaches, nausea, vomiting and wheezing. Pertinent negatives include no chest pain, ear pain, rash or sore throat. She has tried acetaminophen for the symptoms. The treatment provided mild relief.       Past Medical History:   Diagnosis Date    Anticoagulant long-term use     Anxiety     Arthritis     Atrial fibrillation     Cancer     skin    CHF (congestive heart failure)     Depression     DVT (deep venous thrombosis)     GERD (gastroesophageal reflux disease)     Glaucoma (increased eye pressure)     Hyperlipidemia     diet controlled    Hypertension     Interstitial lung disease     Localized hives 1/10/2020    Mild persistent asthma without complication 11/12/2018    Pneumonia 1/31/2014    Stroke 6-3-14    Stroke     TIA (transient ischemic attack)     TIA (transient ischemic attack)        Review of patient's allergies indicates:   Allergen Reactions    Xarelto [rivaroxaban] Rash    Bactrim [sulfamethoxazole-trimethoprim] Other (See Comments)     Upset stomach, dry heaves, confusion    Atorvastatin      Other reaction(s):  Joint pain    Augmentin [amoxicillin-pot clavulanate]      Other reaction(s): Mental Status Change    Baclofen (bulk) Nausea And Vomiting    Ciprofloxacin (bulk)     Decongest tabs      Other reaction(s): increased heart rate    Erythromycin Other (See Comments)    Flecainide Hives     And SOB. No reaction to Lidocaine     Fluoxetine      Other reaction(s): heart palpitations  Other reaction(s): anxiety    Lisinopril Other (See Comments)     cough    Losartan Other (See Comments)     Hypotension with lightheadedness    Morphine Other (See Comments)     confusion    Tramadol Other (See Comments)     SOB, low BP    Venlafaxine analogues      Changes in BP and increases heart rate       Afrin [oxymetazoline] Palpitations    Caffeine Palpitations    Tizanidine Anxiety     dizziness         Current Outpatient Medications:     ammonium lactate (LAC-HYDRIN) 12 % lotion, , Disp: , Rfl:     ATROPINE SULFATE, PF, OPHT, Apply to eye., Disp: , Rfl:     benzonatate (TESSALON) 100 MG capsule, Take 1 capsule (100 mg total) by mouth 3 (three) times daily as needed., Disp: 30 capsule, Rfl: 1    dapsone 25 MG Tab, TAKE 2 TABLETS BY MOUTH ONCE DAILY RECHECK CBC IN 1MONTH, Disp: , Rfl: 0    dorzolamide-timolol (COSOPT) 2-0.5 % ophthalmic solution, Place 1 drop into both eyes 2 (two) times daily. Twice a day, Disp: , Rfl:     edoxaban (SAVAYSA) 60 mg Tab tablet, Take 1 tablet (60 mg total) by mouth once daily., Disp: 30 tablet, Rfl: 11    ferrous sulfate (FEOSOL) 325 mg (65 mg iron) Tab tablet, Take 1 tablet (325 mg total) by mouth 2 (two) times daily. Try on empty stomach first, Disp: 60 tablet, Rfl: 11    fluticasone (FLONASE) 50 mcg/actuation nasal spray, 1 spray (50 mcg total) by Each Nare route once daily., Disp: 1 Bottle, Rfl: 11    fluticasone furoate-vilanterol (BREO ELLIPTA) 200-25 mcg/dose DsDv diskus inhaler, Inhale 1 puff into the lungs once daily., Disp: 1 each, Rfl: 11    FLUZONE HIGH-DOSE 2018-19,  PF, 180 mcg/0.5 mL vaccine, TO BE ADMINISTERED BY PHARMACIST FOR IMMUNIZATION, Disp: , Rfl: 0    gabapentin (NEURONTIN) 300 MG capsule, Take 1 capsule (300 mg total) by mouth 3 (three) times daily., Disp: 90 capsule, Rfl: 2    homatropine (ISOPTO HOMATROPINE) 5 % ophthalmic solution, INSTILL 1 DROP INTO RIGHT EYE ONCE A DAY, Disp: , Rfl: 3    ketorolac (TORADOL) 10 mg tablet, Take 1 tablet (10 mg total) by mouth every 6 (six) hours as needed., Disp: 15 tablet, Rfl: 0    levalbuterol (XOPENEX HFA) 45 mcg/actuation inhaler, Inhale 1-2 puffs into the lungs every 4 (four) hours as needed for Wheezing. This is a rescue inhaler medication and should be used as needed, Disp: 1 Inhaler, Rfl: 11    lorazepam (ATIVAN) 1 MG tablet, TAKE 1 TABLET BY MOUTH DAILY, Disp: 30 tablet, Rfl: 3    metoprolol tartrate (LOPRESSOR) 50 MG tablet, TAKE 1 TABLET (50 MG TOTAL) BY MOUTH 2 (TWO) TIMES DAILY., Disp: 180 tablet, Rfl: 2    metroNIDAZOLE 0.75 % Lotn, APPLY A PEA SIZED AMOUNT TO FACE DAILY, Disp: , Rfl: 2    oseltamivir (TAMIFLU) 75 MG capsule, Take 1 capsule (75 mg total) by mouth 2 (two) times daily. for 5 days, Disp: 10 capsule, Rfl: 0    pantoprazole (PROTONIX) 40 MG tablet, Take 1 tablet (40 mg total) by mouth once daily. (Patient taking differently: Take 40 mg by mouth once daily. ), Disp: 90 tablet, Rfl: 3    predniSONE (DELTASONE) 20 MG tablet, Take 1 tablet (20 mg total) by mouth once daily., Disp: 30 tablet, Rfl: 3    predniSONE (DELTASONE) 5 MG tablet, Take 2 tablets (10 mg total) by mouth 2 (two) times daily. may repeat for shortness of breath., Disp: 36 tablet, Rfl: 1    promethazine (PHENERGAN) 12.5 MG Supp, Place 1 suppository (12.5 mg total) rectally every 6 (six) hours as needed., Disp: 12 suppository, Rfl: 0    rosuvastatin (CRESTOR) 40 MG Tab, TAKE 1 TABLET (40 MG TOTAL) BY MOUTH EVERY EVENING., Disp: 90 tablet, Rfl: 2    spironolactone (ALDACTONE) 25 MG tablet, Take 1 tablet (25 mg total) by mouth  "once daily., Disp: 90 tablet, Rfl: 3    sucralfate (CARAFATE) 1 gram tablet, Take 1 tablet (1 g total) by mouth 4 (four) times daily., Disp: 40 tablet, Rfl: 2    VIIBRYD 40 mg Tab tablet, Take 40 mg by mouth once daily., Disp: , Rfl: 2  No current facility-administered medications for this visit.     Facility-Administered Medications Ordered in Other Visits:     bupivacaine (PF) 0.25% (2.5 mg/ml) injection, , , PRN, Galo Clancy MD, 6 mL at 02/19/19 1314    lidocaine (PF) 10 mg/ml (1%) injection, , , PRN, Galo Clancy MD, 9 mL at 02/19/19 1304    lidocaine (PF) 20 mg/ml (2%) injection, , , PRN, Galo Clancy MD, 6 mL at 02/19/19 1310    methylPREDNISolone acetate injection, , , PRN, Galo Clancy MD, 80 mg at 02/19/19 1314    Review of Systems   Constitutional: Positive for chills and fever. Negative for unexpected weight change.   HENT: Positive for rhinorrhea. Negative for ear pain, sore throat and trouble swallowing.    Eyes: Negative for visual disturbance.   Respiratory: Positive for cough, shortness of breath and wheezing.    Cardiovascular: Negative for chest pain, palpitations and leg swelling.   Gastrointestinal: Positive for nausea and vomiting. Negative for blood in stool.   Genitourinary: Negative for hematuria.   Musculoskeletal: Positive for myalgias.   Skin: Negative for rash.   Allergic/Immunologic: Negative for immunocompromised state.   Neurological: Positive for headaches.   Hematological: Does not bruise/bleed easily.   Psychiatric/Behavioral: Negative for agitation. The patient is not nervous/anxious.        Objective:      BP (!) 142/84 (BP Location: Right arm, Patient Position: Sitting, BP Method: Large (Manual))   Pulse 76   Temp 98.5 °F (36.9 °C) (Oral)   Ht 5' 7" (1.702 m)   Wt 59 kg (130 lb 1.1 oz)   SpO2 97%   BMI 20.37 kg/m²   Physical Exam   Constitutional: She is oriented to person, place, and time. She appears well-developed and well-nourished. She has a sickly appearance. "   Eyes: Pupils are equal, round, and reactive to light. EOM are normal.   Neck: Normal range of motion.   Cardiovascular: Normal rate, regular rhythm and normal heart sounds.   Pulmonary/Chest: Effort normal and breath sounds normal. She has no wheezes. She has no rhonchi. She has no rales.   Abdominal: Soft. Bowel sounds are normal.   Musculoskeletal: Normal range of motion.   Neurological: She is alert and oriented to person, place, and time.   Skin: Skin is warm and dry.   Psychiatric: She has a normal mood and affect. Her behavior is normal. Judgment and thought content normal.       Assessment:       1. Flu syndrome    2. Cough    3. Other specified spondylopathies, lumbar region    4. Recurrent major depressive disorder, in partial remission    5. Chronic diastolic heart failure    6. Interstitial lung disease    7. BMI 20.0-20.9, adult        Plan:       Flu syndrome  -     oseltamivir (TAMIFLU) 75 MG capsule; Take 1 capsule (75 mg total) by mouth 2 (two) times daily. for 5 days  Dispense: 10 capsule; Refill: 0    Cough  -     benzonatate (TESSALON) 100 MG capsule; Take 1 capsule (100 mg total) by mouth 3 (three) times daily as needed.  Dispense: 30 capsule; Refill: 1    Other specified spondylopathies, lumbar region  Stable, followed by [facility]-  Recurrent major depressive disorder, in partial remission  Continue current medications which patient states are normally effective  Chronic diastolic heart failure  Continue current medications and follow up with Dr. Jurado as instructed  Interstitial lung disease  Stable, followed by [facility]    BMI 20.0-20.9, adult  Healthy weight in patient        Time spent with patient: 20 minutes    Patient with be reevaluated in one week or sooner prn    Greater than 50% of this visit was spent counseling as described in above documentation:Yes

## 2020-01-24 VITALS
DIASTOLIC BLOOD PRESSURE: 65 MMHG | BODY MASS INDEX: 20.41 KG/M2 | WEIGHT: 130.06 LBS | TEMPERATURE: 97 F | SYSTOLIC BLOOD PRESSURE: 132 MMHG | OXYGEN SATURATION: 94 % | RESPIRATION RATE: 18 BRPM | HEART RATE: 78 BPM | HEIGHT: 67 IN

## 2020-01-24 LAB
ALBUMIN SERPL BCP-MCNC: 3 G/DL (ref 3.5–5.2)
ALP SERPL-CCNC: 55 U/L (ref 55–135)
ALT SERPL W/O P-5'-P-CCNC: 9 U/L (ref 10–44)
ANION GAP SERPL CALC-SCNC: 10 MMOL/L (ref 8–16)
ANISOCYTOSIS BLD QL SMEAR: SLIGHT
AST SERPL-CCNC: 21 U/L (ref 10–40)
BASOPHILS # BLD AUTO: ABNORMAL K/UL (ref 0–0.2)
BASOPHILS NFR BLD: 0 % (ref 0–1.9)
BILIRUB SERPL-MCNC: 1.4 MG/DL (ref 0.1–1)
BUN SERPL-MCNC: 12 MG/DL (ref 8–23)
CALCIUM SERPL-MCNC: 8.7 MG/DL (ref 8.7–10.5)
CHLORIDE SERPL-SCNC: 110 MMOL/L (ref 95–110)
CO2 SERPL-SCNC: 20 MMOL/L (ref 23–29)
CREAT SERPL-MCNC: 0.7 MG/DL (ref 0.5–1.4)
DIFFERENTIAL METHOD: ABNORMAL
EOSINOPHIL # BLD AUTO: ABNORMAL K/UL (ref 0–0.5)
EOSINOPHIL NFR BLD: 3 % (ref 0–8)
ERYTHROCYTE [DISTWIDTH] IN BLOOD BY AUTOMATED COUNT: 15.9 % (ref 11.5–14.5)
EST. GFR  (AFRICAN AMERICAN): >60 ML/MIN/1.73 M^2
EST. GFR  (NON AFRICAN AMERICAN): >60 ML/MIN/1.73 M^2
GLUCOSE SERPL-MCNC: 102 MG/DL (ref 70–110)
HCT VFR BLD AUTO: 37 % (ref 37–48.5)
HGB BLD-MCNC: 11.7 G/DL (ref 12–16)
IMM GRANULOCYTES # BLD AUTO: ABNORMAL K/UL (ref 0–0.04)
LYMPHOCYTES # BLD AUTO: ABNORMAL K/UL (ref 1–4.8)
LYMPHOCYTES NFR BLD: 6 % (ref 18–48)
MAGNESIUM SERPL-MCNC: 1.5 MG/DL (ref 1.6–2.6)
MCH RBC QN AUTO: 29.6 PG (ref 27–31)
MCHC RBC AUTO-ENTMCNC: 31.6 G/DL (ref 32–36)
MCV RBC AUTO: 94 FL (ref 82–98)
MONOCYTES # BLD AUTO: ABNORMAL K/UL (ref 0.3–1)
MONOCYTES NFR BLD: 7 % (ref 4–15)
NEUTROPHILS NFR BLD: 76 % (ref 38–73)
NEUTS BAND NFR BLD MANUAL: 8 %
NRBC BLD-RTO: 0 /100 WBC
OVALOCYTES BLD QL SMEAR: ABNORMAL
PHOSPHATE SERPL-MCNC: 2.6 MG/DL (ref 2.7–4.5)
PLATELET # BLD AUTO: 196 K/UL (ref 150–350)
PLATELET BLD QL SMEAR: ABNORMAL
PMV BLD AUTO: 9.9 FL (ref 9.2–12.9)
POIKILOCYTOSIS BLD QL SMEAR: SLIGHT
POTASSIUM SERPL-SCNC: 3.7 MMOL/L (ref 3.5–5.1)
PROT SERPL-MCNC: 5.8 G/DL (ref 6–8.4)
RBC # BLD AUTO: 3.95 M/UL (ref 4–5.4)
SODIUM SERPL-SCNC: 140 MMOL/L (ref 136–145)
WBC # BLD AUTO: 8.37 K/UL (ref 3.9–12.7)

## 2020-01-24 PROCEDURE — G0378 HOSPITAL OBSERVATION PER HR: HCPCS

## 2020-01-24 PROCEDURE — 63600175 PHARM REV CODE 636 W HCPCS: Performed by: NURSE PRACTITIONER

## 2020-01-24 PROCEDURE — 80053 COMPREHEN METABOLIC PANEL: CPT

## 2020-01-24 PROCEDURE — 25000003 PHARM REV CODE 250: Performed by: NURSE PRACTITIONER

## 2020-01-24 PROCEDURE — 83735 ASSAY OF MAGNESIUM: CPT

## 2020-01-24 PROCEDURE — 25000242 PHARM REV CODE 250 ALT 637 W/ HCPCS: Performed by: INTERNAL MEDICINE

## 2020-01-24 PROCEDURE — 36415 COLL VENOUS BLD VENIPUNCTURE: CPT

## 2020-01-24 PROCEDURE — 85007 BL SMEAR W/DIFF WBC COUNT: CPT

## 2020-01-24 PROCEDURE — 84100 ASSAY OF PHOSPHORUS: CPT

## 2020-01-24 PROCEDURE — 85027 COMPLETE CBC AUTOMATED: CPT

## 2020-01-24 PROCEDURE — 96372 THER/PROPH/DIAG INJ SC/IM: CPT | Mod: 59 | Performed by: EMERGENCY MEDICINE

## 2020-01-24 PROCEDURE — 96374 THER/PROPH/DIAG INJ IV PUSH: CPT | Performed by: EMERGENCY MEDICINE

## 2020-01-24 PROCEDURE — 25000003 PHARM REV CODE 250: Performed by: INTERNAL MEDICINE

## 2020-01-24 PROCEDURE — 63600175 PHARM REV CODE 636 W HCPCS: Performed by: INTERNAL MEDICINE

## 2020-01-24 PROCEDURE — 96375 TX/PRO/DX INJ NEW DRUG ADDON: CPT | Performed by: EMERGENCY MEDICINE

## 2020-01-24 RX ORDER — DABIGATRAN ETEXILATE 150 MG/1
150 CAPSULE ORAL 2 TIMES DAILY
Status: DISCONTINUED | OUTPATIENT
Start: 2020-01-24 | End: 2020-01-24 | Stop reason: HOSPADM

## 2020-01-24 RX ORDER — FLUTICASONE FUROATE AND VILANTEROL 200; 25 UG/1; UG/1
1 POWDER RESPIRATORY (INHALATION) DAILY
Status: DISCONTINUED | OUTPATIENT
Start: 2020-01-24 | End: 2020-01-24 | Stop reason: HOSPADM

## 2020-01-24 RX ORDER — DICYCLOMINE HYDROCHLORIDE 10 MG/ML
20 INJECTION INTRAMUSCULAR 4 TIMES DAILY PRN
Status: DISCONTINUED | OUTPATIENT
Start: 2020-01-24 | End: 2020-01-24 | Stop reason: HOSPADM

## 2020-01-24 RX ORDER — VILAZODONE HYDROCHLORIDE 40 MG/1
40 TABLET ORAL DAILY
Status: DISCONTINUED | OUTPATIENT
Start: 2020-01-24 | End: 2020-01-24 | Stop reason: HOSPADM

## 2020-01-24 RX ORDER — GUAIFENESIN 100 MG/5ML
200 SOLUTION ORAL EVERY 4 HOURS PRN
Status: DISCONTINUED | OUTPATIENT
Start: 2020-01-24 | End: 2020-01-24 | Stop reason: HOSPADM

## 2020-01-24 RX ORDER — ATROPINE SULFATE 10 MG/G
1 OINTMENT OPHTHALMIC DAILY
Status: DISCONTINUED | OUTPATIENT
Start: 2020-01-24 | End: 2020-01-24 | Stop reason: HOSPADM

## 2020-01-24 RX ORDER — SODIUM,POTASSIUM PHOSPHATES 280-250MG
2 POWDER IN PACKET (EA) ORAL EVERY 4 HOURS
Status: DISCONTINUED | OUTPATIENT
Start: 2020-01-24 | End: 2020-01-24 | Stop reason: HOSPADM

## 2020-01-24 RX ORDER — ONDANSETRON 4 MG/1
4 TABLET, ORALLY DISINTEGRATING ORAL EVERY 6 HOURS PRN
Status: DISCONTINUED | OUTPATIENT
Start: 2020-01-24 | End: 2020-01-24 | Stop reason: HOSPADM

## 2020-01-24 RX ORDER — MAGNESIUM SULFATE HEPTAHYDRATE 40 MG/ML
2 INJECTION, SOLUTION INTRAVENOUS ONCE
Status: COMPLETED | OUTPATIENT
Start: 2020-01-24 | End: 2020-01-24

## 2020-01-24 RX ORDER — ONDANSETRON 4 MG/1
4 TABLET, FILM COATED ORAL EVERY 6 HOURS PRN
Qty: 15 TABLET | Refills: 0 | Status: ON HOLD | OUTPATIENT
Start: 2020-01-24 | End: 2020-03-10

## 2020-01-24 RX ADMIN — MAGNESIUM SULFATE 2 G: 2 INJECTION INTRAVENOUS at 10:01

## 2020-01-24 RX ADMIN — FLUTICASONE FUROATE AND VILANTEROL TRIFENATATE 1 PUFF: 200; 25 POWDER RESPIRATORY (INHALATION) at 09:01

## 2020-01-24 RX ADMIN — VILAZODONE HYDROCHLORIDE 40 MG: 40 TABLET ORAL at 10:01

## 2020-01-24 RX ADMIN — DICYCLOMINE HYDROCHLORIDE 20 MG: 20 INJECTION, SOLUTION INTRAMUSCULAR at 05:01

## 2020-01-24 RX ADMIN — ONDANSETRON 4 MG: 4 TABLET, ORALLY DISINTEGRATING ORAL at 03:01

## 2020-01-24 RX ADMIN — DORZOLAMIDE HYDROCHLORIDE AND TIMOLOL MALEATE 1 DROP: 20; 5 SOLUTION/ DROPS OPHTHALMIC at 08:01

## 2020-01-24 RX ADMIN — POTASSIUM & SODIUM PHOSPHATES POWDER PACK 280-160-250 MG 2 PACKET: 280-160-250 PACK at 02:01

## 2020-01-24 RX ADMIN — PANTOPRAZOLE SODIUM 40 MG: 40 TABLET, DELAYED RELEASE ORAL at 08:01

## 2020-01-24 RX ADMIN — METOPROLOL TARTRATE 50 MG: 50 TABLET ORAL at 08:01

## 2020-01-24 RX ADMIN — FLUTICASONE PROPIONATE 50 MCG: 50 SPRAY, METERED NASAL at 08:01

## 2020-01-24 RX ADMIN — ATROPINE SULFATE 1 EACH: 10 OINTMENT OPHTHALMIC at 10:01

## 2020-01-24 RX ADMIN — GUAIFENESIN 200 MG: 200 SOLUTION ORAL at 01:01

## 2020-01-24 RX ADMIN — POTASSIUM PHOSPHATE, MONOBASIC AND POTASSIUM PHOSPHATE, DIBASIC 20 MMOL: 224; 236 INJECTION, SOLUTION, CONCENTRATE INTRAVENOUS at 10:01

## 2020-01-24 RX ADMIN — GABAPENTIN 300 MG: 300 CAPSULE ORAL at 08:01

## 2020-01-24 RX ADMIN — DABIGATRAN ETEXILATE MESYLATE 150 MG: 75 CAPSULE ORAL at 08:01

## 2020-01-24 NOTE — ASSESSMENT & PLAN NOTE
Likely viral in origin.  Continue supportive care with IV fluid hydration, antibiotics and has allergies Excedrin antipyretics.  If diarrhea ever since, check stool studies for culture/ovoid parasite/WBCs.

## 2020-01-24 NOTE — NURSING
Pt cleared for discharge. VSS. NAD. IV removed, catheter intact. Telemetry removed. Discharge instructions explained. Pt and spouse verbalizes understanding. Pt assisted to vehicle via wheelchair.

## 2020-01-24 NOTE — H&P
[facility] Hospital Medicine  History & Physical    Patient Name: Ayla Dela Cruz  MRN: [id number]  Admission Date: 1/23/2020  Attending Physician: Dale Deal MD   Primary Care Provider: Jan Olivo MD         Patient information was obtained from patient, spouse/SO and ER records.     Subjective:     Principal Problem:Gastroenteritis    Chief Complaint:   Chief Complaint   Patient presents with    Generalized Body Aches     cough, fever since 1/19/2020        HPI: Patient is a 74-year-old  female with past medical history significant for hypertension, paroxysmal atrial fibrillation, anxiety disorder, gastroesophageal reflux disease and prior history of cerebrovascular accident is being admitted to Hospital Medicine under observation status for [facility] room with complaint of progressively worsening generalized body aches and pains associated with nausea and fever.  Patient's symptoms started about 2 days ago as generalized body aches and pains with associated nausea, dry heaves and sinus congestion. Patient was seen by nursing practitioner at primary care physician office and was directed to come to the emergency room.  Patient noted to have 102 degree F fever yesterday.  Patient mostly having dry nonproductive cough but occasionally producing clear sputum.  Patient also had couple of loose watery stools as well.  No recent travel or sick contact history is reported.  Patient received flu shot in October last year.  Patient reports decreased oral intake and sense of dehydration.        Past Medical History:   Diagnosis Date    Anticoagulant long-term use     Anxiety     Arthritis     Atrial fibrillation     Cancer     skin    CHF (congestive heart failure)     Depression     DVT (deep venous thrombosis)     GERD (gastroesophageal reflux disease)     Glaucoma (increased eye pressure)     Hyperlipidemia     diet controlled     Hypertension     Interstitial lung disease     Localized hives 1/10/2020    Mild persistent asthma without complication 11/12/2018    Pneumonia 1/31/2014    Stroke 6-3-14    Stroke     TIA (transient ischemic attack)     TIA (transient ischemic attack)        Past Surgical History:   Procedure Laterality Date    2 heart ablations      3 in total    bilateral cataracts      CHOLECYSTECTOMY      COLONOSCOPY N/A 1/19/2017    Procedure: COLONOSCOPY and EGD;  Surgeon: Jonathan Schultz MD;  Location: [facility] ENDO;  Service: Endoscopy;  Laterality: N/A;    COLONOSCOPY N/A 10/10/2019    Procedure: COLONOSCOPY;  Surgeon: Alex Christian MD;  Location: [facility] ENDO;  Service: Endoscopy;  Laterality: N/A;    ESOPHAGOGASTRODUODENOSCOPY N/A 10/14/2019    Procedure: EGD (ESOPHAGOGASTRODUODENOSCOPY);  Surgeon: Alex Christian MD;  Location: [facility] ENDO;  Service: Endoscopy;  Laterality: N/A;    INJECTION OF ANESTHETIC AGENT AROUND MEDIAL BRANCH NERVES INNERVATING CERVICAL FACET JOINT Right 6/7/2018    Procedure: BLOCK, NERVE, FACET JOINT, MEDIAL BRANCH, CERVICAL;  Surgeon: Galo Clancy MD;  Location: [facility] OR;  Service: Pain Management;  Laterality: Right;  C4, 5, 6    OPEN REDUCTION AND INTERNAL FIXATION (ORIF) OF INJURY OF ANKLE Right 11/1/2018    Procedure: ORIF, ANKLE;  Surgeon: Wilfredo Aguero MD;  Location: [facility] OR;  Service: Orthopedics;  Laterality: Right;    pyloristenosis      RADIOFREQUENCY ABLATION OF LUMBAR MEDIAL BRANCH NERVE AT SINGLE LEVEL Bilateral 9/21/2018    Procedure: RADIOFREQUENCY ABLATION, NERVE, SPINAL, LUMBAR, MEDIAL BRANCH, 1 LEVEL;  Surgeon: Galo Clancy MD;  Location: [facility] OR;  Service: Pain Management;  Laterality: Bilateral;  L3, 4, 5    RADIOFREQUENCY ABLATION OF LUMBAR MEDIAL BRANCH NERVE AT SINGLE LEVEL Bilateral 2/19/2019    Procedure: Radiofrequency Ablation, Nerve, Spinal, Lumbar, Medial Branch, 1 Level;  Surgeon: Galo Clancy MD;  Location: [facility] OR;  Service: Pain Management;   Laterality: Bilateral;  L3, 4, 5     RADIOFREQUENCY THERMAL COAGULATION OF MEDIAL BRANCH OF POSTERIOR RAMUS OF CERVICAL SPINAL NERVE Right 7/3/2018    Procedure: RADIOFREQUENCY THERMAL COAGULATION, NERVE, SPINAL, CERVICAL, MEDIAL BRANCH OF POSTERIOR RAMUS;  Surgeon: Galo Clancy MD;  Location: [facility] OR;  Service: Pain Management;  Laterality: Right;  C4,5,6 - Burned at 80 degrees C. for 75 seconds each site    RADIOFREQUENCY THERMAL COAGULATION OF MEDIAL BRANCH OF POSTERIOR RAMUS OF CERVICAL SPINAL NERVE Right 7/23/2019    Procedure: RADIOFREQUENCY THERMAL COAGULATION, NERVE, SPINAL, CERVICAL, POSTERIOR RAMUS, MEDIAL BRANCH;  Surgeon: Galo Clancy MD;  Location: [facility] OR;  Service: Pain Management;  Laterality: Right;  C4,5,6    RADIOFREQUENCY THERMOCOAGULATION Bilateral 9/10/2019    Procedure: RADIOFREQUENCY THERMAL COAGULATION LUMBAR;  Surgeon: Galo Clancy MD;  Location: [facility] OR;  Service: Pain Management;  Laterality: Bilateral;  L3,4,5 - Burned at 80 degrees C. for 60 seconds x 2 each site    skin cancer removal       TONSILLECTOMY         Review of patient's allergies indicates:   Allergen Reactions    Xarelto [rivaroxaban] Rash    Bactrim [sulfamethoxazole-trimethoprim] Other (See Comments)     Upset stomach, dry heaves, confusion    Atorvastatin      Other reaction(s): Joint pain    Augmentin [amoxicillin-pot clavulanate]      Other reaction(s): Mental Status Change    Baclofen (bulk) Nausea And Vomiting    Ciprofloxacin (bulk)     Decongest tabs      Other reaction(s): increased heart rate    Erythromycin Other (See Comments)    Flecainide Hives     And SOB. No reaction to Lidocaine     Fluoxetine      Other reaction(s): heart palpitations  Other reaction(s): anxiety    Lisinopril Other (See Comments)     cough    Losartan Other (See Comments)     Hypotension with lightheadedness    Morphine Other (See Comments)     confusion    Tramadol Other (See Comments)     SOB, low BP    Venlafaxine  "analogues      Changes in BP and increases heart rate       Afrin [oxymetazoline] Palpitations    Caffeine Palpitations    Tizanidine Anxiety     dizziness       Current Facility-Administered Medications on File Prior to Encounter   Medication    bupivacaine (PF) 0.25% (2.5 mg/ml) injection    lidocaine (PF) 10 mg/ml (1%) injection    lidocaine (PF) 20 mg/ml (2%) injection    methylPREDNISolone acetate injection     Current Outpatient Medications on File Prior to Encounter   Medication Sig    ammonium lactate (LAC-HYDRIN) 12 % lotion     ATROPINE SULFATE, PF, OPHT Place into the right eye once daily.     benzonatate (TESSALON) 100 MG capsule Take 1 capsule (100 mg total) by mouth 3 (three) times daily as needed. (Patient taking differently: Take 100 mg by mouth 3 (three) times daily as needed for Cough. )    dabigatran etexilate (PRADAXA) 150 mg Cap Take 150 mg by mouth 2 (two) times daily. "Do NOT break, chew, or open capsules."    dorzolamide-timolol (COSOPT) 2-0.5 % ophthalmic solution Place 1 drop into both eyes 2 (two) times daily. Twice a day    ferrous sulfate (FEOSOL) 325 mg (65 mg iron) Tab tablet Take 1 tablet (325 mg total) by mouth 2 (two) times daily. Try on empty stomach first    fluticasone (FLONASE) 50 mcg/actuation nasal spray 1 spray (50 mcg total) by Each Nare route once daily.    fluticasone furoate-vilanterol (BREO ELLIPTA) 200-25 mcg/dose DsDv diskus inhaler Inhale 1 puff into the lungs once daily.    gabapentin (NEURONTIN) 300 MG capsule Take 1 capsule (300 mg total) by mouth 3 (three) times daily. (Patient taking differently: Take 600 mg by mouth every evening. )    homatropine (ISOPTO HOMATROPINE) 5 % ophthalmic solution INSTILL 1 DROP INTO RIGHT EYE ONCE A DAY    levalbuterol (XOPENEX HFA) 45 mcg/actuation inhaler Inhale 1-2 puffs into the lungs every 4 (four) hours as needed for Wheezing. This is a rescue inhaler medication and should be used as needed    LORazepam " (ATIVAN) 0.5 MG tablet Take 0.5 mg by mouth every 12 (twelve) hours as needed for Anxiety.    metoprolol tartrate (LOPRESSOR) 50 MG tablet TAKE 1 TABLET (50 MG TOTAL) BY MOUTH 2 (TWO) TIMES DAILY.    metroNIDAZOLE 0.75 % Lotn APPLY A PEA SIZED AMOUNT TO FACE DAILY    oseltamivir (TAMIFLU) 75 MG capsule Take 1 capsule (75 mg total) by mouth 2 (two) times daily. for 5 days    pantoprazole (PROTONIX) 40 MG tablet Take 1 tablet (40 mg total) by mouth once daily. (Patient taking differently: Take 40 mg by mouth once daily. )    promethazine (PHENERGAN) 12.5 MG Supp Place 1 suppository (12.5 mg total) rectally every 6 (six) hours as needed.    rosuvastatin (CRESTOR) 40 MG Tab TAKE 1 TABLET (40 MG TOTAL) BY MOUTH EVERY EVENING.    spironolactone (ALDACTONE) 25 MG tablet Take 1 tablet (25 mg total) by mouth once daily.    VIIBRYD 40 mg Tab tablet Take 40 mg by mouth once daily.    [DISCONTINUED] dapsone 25 MG Tab TAKE 2 TABLETS BY MOUTH ONCE DAILY RECHECK CBC IN 1MONTH    [DISCONTINUED] edoxaban (SAVAYSA) 60 mg Tab tablet Take 1 tablet (60 mg total) by mouth once daily.    [DISCONTINUED] FLUZONE HIGH-DOSE 2018-19, PF, 180 mcg/0.5 mL vaccine TO BE ADMINISTERED BY PHARMACIST FOR IMMUNIZATION    [DISCONTINUED] ketorolac (TORADOL) 10 mg tablet Take 1 tablet (10 mg total) by mouth every 6 (six) hours as needed.    [DISCONTINUED] lorazepam (ATIVAN) 1 MG tablet TAKE 1 TABLET BY MOUTH DAILY    [DISCONTINUED] predniSONE (DELTASONE) 20 MG tablet Take 1 tablet (20 mg total) by mouth once daily.    [DISCONTINUED] predniSONE (DELTASONE) 5 MG tablet Take 2 tablets (10 mg total) by mouth 2 (two) times daily. may repeat for shortness of breath.    [DISCONTINUED] sucralfate (CARAFATE) 1 gram tablet Take 1 tablet (1 g total) by mouth 4 (four) times daily.     Family History     Problem Relation (Age of Onset)    Alzheimer's disease Maternal Uncle    Arthritis Mother    Asthma Mother    Cancer Maternal Grandfather, Paternal  Grandmother    Depression Son    Emphysema Maternal Grandfather    Heart disease Father    Kidney disease Maternal Grandfather    Pneumonia Mother, Paternal Grandfather    Rheum arthritis Mother, Maternal Grandmother    Ulcers Father        Tobacco Use    Smoking status: Never Smoker    Smokeless tobacco: Never Used   Substance and Sexual Activity    Alcohol use: No     Frequency: Monthly or less     Drinks per session: 1 or 2     Binge frequency: Never    Drug use: No    Sexual activity: Yes     Partners: Male     Review of Systems   Constitutional: Positive for appetite change, chills, fatigue and fever.   HENT: Positive for sinus pressure.    Respiratory: Positive for cough.    Musculoskeletal: Positive for arthralgias and myalgias.   Neurological: Positive for dizziness, weakness and headaches.   All other systems reviewed and are negative.    Objective:     Vital Signs (Most Recent):  Temp: 99 °F (37.2 °C) (01/23/20 1815)  Pulse: 103 (01/23/20 1832)  Resp: 16 (01/23/20 1621)  BP: (!) 140/65 (01/23/20 1832)  SpO2: (!) 93 % (01/23/20 1832) Vital Signs (24h Range):  Temp:  [98.5 °F (36.9 °C)-102.7 °F (39.3 °C)] 99 °F (37.2 °C)  Pulse:  [] 103  Resp:  [16] 16  SpO2:  [92 %-97 %] 93 %  BP: (140-166)/(65-87) 140/65     Weight: 59 kg (130 lb 1.1 oz)  Body mass index is 20.37 kg/m².    Physical Exam   Constitutional: She is oriented to person, place, and time. She appears well-developed.   Ill appearing female   HENT:   Head: Normocephalic and atraumatic.   Dry mucous membrane   Eyes: Pupils are equal, round, and reactive to light. Conjunctivae are normal.   Neck: Neck supple. No JVD present. No thyromegaly present.   Cardiovascular: Normal rate and regular rhythm. Exam reveals no gallop and no friction rub.   No murmur heard.  Pulmonary/Chest: Effort normal and breath sounds normal.   Abdominal: Soft. Bowel sounds are normal. She exhibits no distension and no mass. There is no tenderness.   Musculoskeletal:  Normal range of motion. She exhibits no edema.   Neurological: She is oriented to person, place, and time. No cranial nerve deficit.   Skin: Skin is warm and dry.   Psychiatric: Her behavior is normal.         CRANIAL NERVES     CN III, IV, VI   Pupils are equal, round, and reactive to light.       Significant Labs:   CBC:   Recent Labs   Lab 01/23/20 1709   WBC 10.11   HGB 12.5   HCT 38.7        CMP:   Recent Labs   Lab 01/23/20 1709      K 3.9      CO2 23   *   BUN 14   CREATININE 0.8   CALCIUM 9.2   PROT 6.2   ALBUMIN 3.2*   BILITOT 1.7*   ALKPHOS 62   AST 20   ALT 9*   ANIONGAP 10   EGFRNONAA >60     Lipase: No results for input(s): LIPASE in the last 48 hours.  Urine Studies:   Recent Labs   Lab 01/23/20 1738   COLORU Yellow   APPEARANCEUA Clear   PHUR 6.0   SPECGRAV >=1.030*   PROTEINUA 2+*   GLUCUA Negative   KETONESU 3+*   BILIRUBINUA Negative   OCCULTUA Trace*   NITRITE Negative   UROBILINOGEN 1.0   LEUKOCYTESUR Negative   RBCUA 2   WBCUA 0   BACTERIA None   HYALINECASTS 0     Microbiology Results (last 7 days)     Procedure Component Value Units Date/Time    Influenza A & B by Molecular [[id number]] Collected:  01/23/20 1714    Order Status:  Completed Specimen:  Nasopharyngeal Swab Updated:  01/23/20 1740     Influenza A, Molecular Negative     Influenza B, Molecular Negative     Flu A & B Source Nasal swab    Blood culture x two cultures. Draw prior to antibiotics. [[id number]] Collected:  01/23/20 1708    Order Status:  Sent Specimen:  Blood Updated:  01/23/20 1709    Blood culture x two cultures. Draw prior to antibiotics. [[id number]] Collected:  01/23/20 1708    Order Status:  Sent Specimen:  Blood Updated:  01/23/20 1709          Significant Imaging:   CXR: Mild chronic appearing interstitial changes with no confluent infiltrate or significant change compared to the prior exam.     Assessment/Plan:     * Gastroenteritis  Likely viral in origin.  Continue supportive care  with IV fluid hydration, antibiotics and has allergies Excedrin antipyretics.  If diarrhea ever since, check stool studies for culture/ovoid parasite/WBCs.      GERD (gastroesophageal reflux disease)  Continue proton pump inhibitor      JOSE (generalized anxiety disorder)  Continue Ativan as before      Depression  Continue Viibryd.      Hyperlipidemia, baseline   Hold statin therapy today.      Hypertension, 1985  Chronic problem. Will continue chronic medications and monitor for any changes, adjusting as needed.          Paroxysmal atrial fibrillation, ablations X 3 1995, 1997, 9/2017, CHADS-VAS score 5, HAS-BLED score 5   Continue metoprolol and Pradaxa as before.  .  Continue tele monitoring        DVT prophylaxis:  On Pradaxa.    Dale Deal MD  Department of Hospital Medicine   [facility]

## 2020-01-24 NOTE — ED NOTES
Pt reports improvement in symptoms. Reports feeling much better after zofran and nausea is gone.  remains at bedside. Monitoring continues.

## 2020-01-24 NOTE — PLAN OF CARE
01/24/20 1447   Final Note   Assessment Type Final Discharge Note   Anticipated Discharge Disposition Home   Hospital Follow Up  Appt(s) scheduled? Yes

## 2020-01-24 NOTE — PLAN OF CARE
Pt already has PCP appt for next week. AVS updated.      01/24/20 1447   Discharge Assessment   Assessment Type Discharge Planning Reassessment

## 2020-01-24 NOTE — DISCHARGE SUMMARY
[facility] Hospital Medicine  Discharge Summary      Patient Name: Ayla Dela Cruz  MRN: [id number]  Admission Date: 1/23/2020  Hospital Length of Stay: 0 days  Discharge Date and Time:  01/24/2020 2:40 PM  Attending Physician: Dale Deal MD   Discharging Provider: Dale Deal MD  Primary Care Provider: Jan Olivo MD      HPI:   Patient is a 74-year-old  female with past medical history significant for hypertension, paroxysmal atrial fibrillation, anxiety disorder, gastroesophageal reflux disease and prior history of cerebrovascular accident is being admitted to Hospital Medicine under observation status for [facility] Emergency room with complaint of progressively worsening generalized body aches and pains associated with nausea and fever.  Patient's symptoms started about 2 days ago as generalized body aches and pains with associated nausea, dry heaves and sinus congestion. Patient was seen by nursing practitioner at primary care physician office and was directed to come to the emergency room.  Patient noted to have 102 degree F fever yesterday.  Patient mostly having dry nonproductive cough but occasionally producing clear sputum.  Patient also had couple of loose watery stools as well.  No recent travel or sick contact history is reported.  Patient received flu shot in October last year.  Patient reports decreased oral intake and sense of dehydration.      Hospital Course:   Patient was admitted to medicine telemetry floor.  Patient was provided supportive care with IV fluid hydration, antiemetics and use of acetaminophen.  Patient's symptoms improved.  Patient is tolerating diet.  Patient is anxious to go home.  Patient was encouraged to keep herself well hydrated and avoid physical exertion.  Patient and her  voiced understanding.    Consults: None    CXR: Mild chronic appearing interstitial changes with no confluent infiltrate or  significant change compared to the prior exam    Microbiology Results (last 7 days)     Procedure Component Value Units Date/Time    Blood culture x two cultures. Draw prior to antibiotics. [[id number]] Collected:  01/23/20 1708    Order Status:  Completed Specimen:  Blood from Antecubital, Left  Arm Updated:  01/24/20 0515     Blood Culture, Routine No Growth to date    Narrative:       Aerobic and anaerobic    Blood culture x two cultures. Draw prior to antibiotics. [[id number]] Collected:  01/23/20 1708    Order Status:  Completed Specimen:  Blood from Antecubital, Right  Arm Updated:  01/24/20 0515     Blood Culture, Routine No Growth to date    Narrative:       Aerobic and anaerobic    Stool culture [[id number]]     Order Status:  No result Specimen:  Stool     Influenza A & B by Molecular [[id number]] Collected:  01/23/20 1714    Order Status:  Completed Specimen:  Nasopharyngeal Swab Updated:  01/23/20 1740     Influenza A, Molecular Negative     Influenza B, Molecular Negative     Flu A & B Source Nasal swab          Final Active Diagnoses:    Diagnosis Date Noted POA    PRINCIPAL PROBLEM:  Gastroenteritis [K52.9] 01/23/2020 Yes    GERD (gastroesophageal reflux disease) [K21.9] 10/14/2019 Yes    JOSE (generalized anxiety disorder) [F41.1] 11/15/2017 Yes    Depression [F32.9] 06/20/2014 Yes    Hypertension, 1985 [I10]  Yes    Paroxysmal atrial fibrillation, ablations X 3 1995, 1997, 9/2017, CHADS-VAS score 5, HAS-BLED score 5  [I48.0]  Yes    Hyperlipidemia, baseline  [E78.5]  Yes      Problems Resolved During this Admission:       Discharged Condition: good    Disposition: Home or Self Care    Follow Up:  Follow-up Information     Jan Olivo MD In 1 week.    Specialty:  Family Medicine  Contact information:  [address] [place]  [place] [id number]  [phone]             Please follow up.    Contact information:  Keep yourself well hydrated.  Avoid physical exertion.  Stain does and avoid  public.           Please follow up.    Contact information:  Use Tylenol for body aches and pains and fever management.               Patient Instructions:      Diet Cardiac   Order Comments: Patient to monitor daily weight, follow low salt diet and in case if gain more than 3 pounds in 24 hours, please call your doctor for further advice.     Other restrictions (specify):   Order Comments: PLEASE OBSERVE FALL PRECAUTIONS     Call MD for:   Order Comments: For worsening symptoms, chest pain, shortness of breath, increased abdominal pain, high grade fever, stroke or stroke like symptoms, immediately go to the nearest Emergency Room or call 911 as soon as possible.       Significant Diagnostic Studies: Labs:   CMP   Recent Labs   Lab 01/23/20  1709 01/24/20  0413    140   K 3.9 3.7    110   CO2 23 20*   * 102   BUN 14 12   CREATININE 0.8 0.7   CALCIUM 9.2 8.7   PROT 6.2 5.8*   ALBUMIN 3.2* 3.0*   BILITOT 1.7* 1.4*   ALKPHOS 62 55   AST 20 21   ALT 9* 9*   ANIONGAP 10 10   ESTGFRAFRICA >60 >60   EGFRNONAA >60 >60    and CBC   Recent Labs   Lab 01/23/20  1709 01/24/20  0413   WBC 10.11 8.37   HGB 12.5 11.7*   HCT 38.7 37.0    196       Pending Diagnostic Studies:     None         Medications:  Reconciled Home Medications:      Medication List      START taking these medications    ondansetron 4 MG tablet  Commonly known as:  ZOFRAN  Take 1 tablet (4 mg total) by mouth every 6 (six) hours as needed for Nausea.        CHANGE how you take these medications    benzonatate 100 MG capsule  Commonly known as:  TESSALON  Take 1 capsule (100 mg total) by mouth 3 (three) times daily as needed.  What changed:  reasons to take this     gabapentin 300 MG capsule  Commonly known as:  NEURONTIN  Take 1 capsule (300 mg total) by mouth 3 (three) times daily.  What changed:    · how much to take  · when to take this        CONTINUE taking these medications    ammonium lactate 12 % lotion  Commonly known as:   "LAC-HYDRIN     ATROPINE SULFATE (PF) OPHT  Place into the right eye once daily.     Cosopt 22.3-6.8 mg/mL ophthalmic solution  Generic drug:  dorzolamide-timolol 2-0.5%  Place 1 drop into both eyes 2 (two) times daily. Twice a day     dabigatran etexilate 150 mg Cap  Commonly known as:  PRADAXA  Take 150 mg by mouth 2 (two) times daily. "Do NOT break, chew, or open capsules."     ferrous sulfate 325 mg (65 mg iron) Tab tablet  Commonly known as:  FEOSOL  Take 1 tablet (325 mg total) by mouth 2 (two) times daily. Try on empty stomach first     fluticasone furoate-vilanterol 200-25 mcg/dose Dsdv diskus inhaler  Commonly known as:  Breo Ellipta  Inhale 1 puff into the lungs once daily.     fluticasone propionate 50 mcg/actuation nasal spray  Commonly known as:  FLONASE  1 spray (50 mcg total) by Each Nare route once daily.     homatropine 5 % ophthalmic solution  Commonly known as:  ISOPTO HOMATROPINE  INSTILL 1 DROP INTO RIGHT EYE ONCE A DAY     levalbuterol 45 mcg/actuation inhaler  Commonly known as:  XOPENEX HFA  Inhale 1-2 puffs into the lungs every 4 (four) hours as needed for Wheezing. This is a rescue inhaler medication and should be used as needed     LORazepam 0.5 MG tablet  Commonly known as:  ATIVAN  Take 0.5 mg by mouth every 12 (twelve) hours as needed for Anxiety.     metoprolol tartrate 50 MG tablet  Commonly known as:  LOPRESSOR  TAKE 1 TABLET (50 MG TOTAL) BY MOUTH 2 (TWO) TIMES DAILY.     oseltamivir 75 MG capsule  Commonly known as:  TAMIFLU  Take 1 capsule (75 mg total) by mouth 2 (two) times daily. for 5 days     pantoprazole 40 MG tablet  Commonly known as:  PROTONIX  Take 1 tablet (40 mg total) by mouth once daily.     promethazine 12.5 MG Supp  Commonly known as:  PHENERGAN  Place 1 suppository (12.5 mg total) rectally every 6 (six) hours as needed.     rosuvastatin 40 MG Tab  Commonly known as:  CRESTOR  TAKE 1 TABLET (40 MG TOTAL) BY MOUTH EVERY EVENING.     Viibryd 40 mg Tab tablet  Generic " drug:  vilazodone  Take 40 mg by mouth once daily.        STOP taking these medications    metroNIDAZOLE 0.75 % Lotn     spironolactone 25 MG tablet  Commonly known as:  ALDACTONE            Indwelling Lines/Drains at time of discharge:   Lines/Drains/Airways     Epidural Line                 Perineural Analgesia/Anesthesia Assessment (using dermatomes) Epidural 11/01/18 0715 449 days                Time spent on the discharge of patient: 26 minutes  Patient was seen and examined on the date of discharge and determined to be suitable for discharge.         Dale Deal MD  Department of Hospital Medicine  [facility]

## 2020-01-24 NOTE — HPI
Patient is a 74-year-old  female with past medical history significant for hypertension, paroxysmal atrial fibrillation, anxiety disorder, gastroesophageal reflux disease and prior history of cerebrovascular accident is being admitted to Hospital Medicine under observation status for [facility] Emergency room with complaint of progressively worsening generalized body aches and pains associated with nausea and fever.  Patient's symptoms started about 2 days ago as generalized body aches and pains with associated nausea, dry heaves and sinus congestion. Patient was seen by nursing practitioner at primary care physician office and was directed to come to the emergency room.  Patient noted to have 102 degree F fever yesterday.  Patient mostly having dry nonproductive cough but occasionally producing clear sputum.  Patient also had couple of loose watery stools as well.  No recent travel or sick contact history is reported.  Patient received flu shot in October last year.  Patient reports decreased oral intake and sense of dehydration.

## 2020-01-24 NOTE — PLAN OF CARE
Patient alert and oriented resting in bed. Denies pain or SOB. Patient C/O nausea with episodes of dry heaving. NP notified. Medications given as ordered. VSS with BP elevated. AFib. Up with standby assistance. Room air. Bed alarm active. Plan of care reviewed with patient. Verbalizes understanding.Call light in reach. Pt free from fall or injury. Will monitor.

## 2020-01-24 NOTE — PLAN OF CARE
CM met with patient and spouse, patient reports living with spouse, denies POA or living will. PCP is Dr. Olivo, pharmacy is [facility] and denies  services. Patient reports independence, denies use of assist devices and plans to return home with no needs. CM will follow.     01/24/20 1115   Discharge Assessment   Assessment Type Discharge Planning Assessment   Confirmed/corrected address and phone number on facesheet? Yes   Assessment information obtained from? Patient;Caregiver   Prior to hospitilization cognitive status: Alert/Oriented   Prior to hospitalization functional status: Independent   Current Functional Status: Independent   Lives With spouse   Able to Return to Prior Arrangements yes   Is patient able to care for self after discharge? Yes   Patient's perception of discharge disposition home or selfcare   Readmission Within the Last 30 Days no previous admission in last 30 days   Patient currently being followed by outpatient case management? No   Patient currently receives any other outside agency services? No   Equipment Currently Used at Home none   Do you have any problems affording any of your prescribed medications? No   Is the patient taking medications as prescribed? yes   Does the patient have transportation home? Yes   Transportation Anticipated family or friend will provide   Does the patient receive services at the [facility]? No   Discharge Plan A Home   DME Needed Upon Discharge  none   Patient/Family in Agreement with Plan yes

## 2020-01-28 ENCOUNTER — DOCUMENTATION ONLY (OUTPATIENT)
Dept: FAMILY MEDICINE | Facility: CLINIC | Age: 75
End: 2020-01-28

## 2020-01-28 DIAGNOSIS — I48.0 PAF (PAROXYSMAL ATRIAL FIBRILLATION): Primary | ICD-10-CM

## 2020-01-28 LAB
BACTERIA BLD CULT: NORMAL
BACTERIA BLD CULT: NORMAL

## 2020-01-28 NOTE — PROGRESS NOTES
aSubjective:      Cardiologist: Barrett Jurado MD    I had the pleasure of seeing Ayla Dela Cruz in follow up for her history of atrial arrhythmias and PVI. She is a 74 year old female with a history of HTN, HLD, and TIA in 2014, whose history of arrhythmias dates back to her 30s when she began to experience sudden onset palpitations. She was started on Tambacor at that time, which improved her symptoms. She was started on Coumadin at age 55 years. In the mid-1990s, she underwent an ablation for what sounds like atrial flutter in [place], [place]. In 1997 she underwent a second ablation which she was told was unsuccessful. Since that time she has undergone two electrical cardioversions, once in the mid-2000s (which lasted 5 years), and another around 2010. Around 2014 her arrhythmias recurred around the time she had her TIA, for which she was placed on Xarelto.    I reviewed all ECGs in the EMR at her initial office visit. ECGs from 5423-5657 showed sinus rhythm. ECGs from 1/2014 and 4/2014 showed AF. ECGs between 6/2014 and 1/2016 showed sinus bradycardia and NSR. All ECGs between 1/16/2016 and 5/2017 showed either AF or AFL. When in AFL, RVR was generally seen.    In 9/2017, a PVI was performed. All four PVs were reconnected from a prior PVI, and successfully reisolated. A septal MA flutter line was also created due to frequent inductions during the case. The existing CTI-line was found to be intact. She was discharged on Amiodarone 100 mg daily. At her 11/2017 follow-up, Ms. Dela Cruz was feeling well apart from occasional AVILA relieved with lasix. Her energy level had vastly improved following ablation. Amiodarone was stopped at that visit (I was concerned it was causing her intermittent AVILA). Stopping Amiodarone improved her symptoms. A 48 hour Holter monitor performed in 11/2017 showed sinus rhythm with an average HR of 58 bpm.     At her 2/2018 visit Ms. Dela Cruz continued to feel well, with no  complaints. However, beginning in early 8/2018, Ms. Dela Cruz began to note significant AVILA, lightheadedness, and dizziness, which temporally correlated with her starting Tramadol. She had been off this medication for a few days. She also cut losartan from 100 mg to 50 mg once daily on her own. At her 9/2018 visit I reviewed recent HR and BP trends, with SBPs frequently in the 80-90s mmHg range. At that visit I stopped losartan. A 48 hour Holter monitor showed sinus rhythm with an average HR of 63 bpm, with a 6.6% PAC burden.     At her last visit in 11/2018, Ms. Dela Cruz had an episode of near syncope in [facility], which resulted in a right ankle break for which she required surgery. Otherwise she was overall feeling much better, with improved breathing and lightheadedness.    Over the past year Ms. Dela Cruz has had issues with NOACs. She specifically describes pruritis on Xarelto, and pruritis on Eliquis. She is currently on Pradaxa and tolerating it. Ms. 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    My interpretation of today's ECG is sinus rhythm with PACs at 66 bpm.    Review of Systems   Constitution: Negative for decreased appetite, malaise/fatigue, weight gain and weight loss.   HENT: Negative for sore throat.    Eyes: Negative for blurred vision.   Cardiovascular: Negative for chest pain, dyspnea on exertion, irregular heartbeat, leg swelling, near-syncope, orthopnea, palpitations, paroxysmal nocturnal dyspnea and syncope.   Respiratory: Negative for shortness of breath.    Skin: Negative for rash.   Musculoskeletal: Negative for arthritis.   Gastrointestinal: Negative for abdominal pain.   Neurological: Negative for focal weakness and light-headedness.   Psychiatric/Behavioral: Negative for altered mental status.        Objective:    Physical Exam   Constitutional: She is oriented to person, place, and time. She appears well-developed  and well-nourished. No distress.   HENT:   Head: Normocephalic and atraumatic.   Mouth/Throat: Oropharynx is clear and moist.   Eyes: Pupils are equal, round, and reactive to light. Conjunctivae are normal. No scleral icterus.   Neck: Normal range of motion. Neck supple. No JVD present. No thyromegaly present.   Cardiovascular: Normal rate, regular rhythm, normal heart sounds and intact distal pulses. Exam reveals no gallop and no friction rub.   No murmur heard.  Pulmonary/Chest: Effort normal and breath sounds normal. No respiratory distress. She has no rales.   Abdominal: Soft. Bowel sounds are normal. She exhibits no distension.   Musculoskeletal: She exhibits no edema.   Neurological: She is alert and oriented to person, place, and time.   Skin: Skin is warm and dry.   Psychiatric: She has a normal mood and affect. Her behavior is normal.   Vitals reviewed.        Assessment:       1. Paroxysmal atrial fibrillation, ablations X 3 1995, 1997, 9/2017, CHADS-VAS score 5, HAS-BLED score 5     2. S/P ablation of atrial flutter    3. H/O: CVA (cerebrovascular accident), June 2014    4. TIA (transient ischemic attack), 11/3/2014    5. Essential hypertension    6. Hyperlipidemia, unspecified hyperlipidemia type    7. Anticoagulant long-term use    8. Renal infarct, due to embolism off Eliquis for 7 days         Plan:   In summary, Ayla Dela Cruz is a 74 year old female with a longstanding history of atrial arrhythmias and prior ablations at outside institutions. Her [id number] Score is 5 (age, female, TIA, HTN) so she should remain on anticoagulation indefinitely. She is now >2 years post-PVI and MA flutter line, and is maintaining sinus rhythm off Amiodarone.     Ms. Dela Cruz recently had a likely cardioembolic event after stopping AC for a week. The plan is to continue Pradaxa, and to see me again in 1 year.    Thank you for allowing me to participate in the care of this patient. Please do not hesitate to  call me with any questions or concerns.

## 2020-01-30 ENCOUNTER — OFFICE VISIT (OUTPATIENT)
Dept: FAMILY MEDICINE | Facility: CLINIC | Age: 75
End: 2020-01-30
Payer: MEDICARE

## 2020-01-30 ENCOUNTER — PATIENT OUTREACH (OUTPATIENT)
Dept: ADMINISTRATIVE | Facility: OTHER | Age: 75
End: 2020-01-30

## 2020-01-30 VITALS
TEMPERATURE: 98 F | HEIGHT: 67 IN | WEIGHT: 127.88 LBS | DIASTOLIC BLOOD PRESSURE: 82 MMHG | RESPIRATION RATE: 16 BRPM | HEART RATE: 73 BPM | BODY MASS INDEX: 20.07 KG/M2 | SYSTOLIC BLOOD PRESSURE: 130 MMHG | OXYGEN SATURATION: 95 %

## 2020-01-30 DIAGNOSIS — E83.42 HYPOMAGNESEMIA: ICD-10-CM

## 2020-01-30 DIAGNOSIS — I10 ESSENTIAL HYPERTENSION: ICD-10-CM

## 2020-01-30 DIAGNOSIS — E83.39 HYPOPHOSPHATEMIA: ICD-10-CM

## 2020-01-30 DIAGNOSIS — Z09 HOSPITAL DISCHARGE FOLLOW-UP: Primary | ICD-10-CM

## 2020-01-30 DIAGNOSIS — J06.9 URI, ACUTE: ICD-10-CM

## 2020-01-30 DIAGNOSIS — J84.9 INTERSTITIAL LUNG DISEASE: ICD-10-CM

## 2020-01-30 PROCEDURE — 99999 PR PBB SHADOW E&M-EST. PATIENT-LVL V: ICD-10-PCS | Mod: PBBFAC,,, | Performed by: PHYSICIAN ASSISTANT

## 2020-01-30 PROCEDURE — 1101F PT FALLS ASSESS-DOCD LE1/YR: CPT | Mod: S$GLB,,, | Performed by: PHYSICIAN ASSISTANT

## 2020-01-30 PROCEDURE — 3075F SYST BP GE 130 - 139MM HG: CPT | Mod: S$GLB,,, | Performed by: PHYSICIAN ASSISTANT

## 2020-01-30 PROCEDURE — 1126F PR PAIN SEVERITY QUANTIFIED, NO PAIN PRESENT: ICD-10-PCS | Mod: S$GLB,,, | Performed by: PHYSICIAN ASSISTANT

## 2020-01-30 PROCEDURE — 3079F PR MOST RECENT DIASTOLIC BLOOD PRESSURE 80-89 MM HG: ICD-10-PCS | Mod: S$GLB,,, | Performed by: PHYSICIAN ASSISTANT

## 2020-01-30 PROCEDURE — 99214 PR OFFICE/OUTPT VISIT, EST, LEVL IV, 30-39 MIN: ICD-10-PCS | Mod: S$GLB,,, | Performed by: PHYSICIAN ASSISTANT

## 2020-01-30 PROCEDURE — 1159F PR MEDICATION LIST DOCUMENTED IN MEDICAL RECORD: ICD-10-PCS | Mod: S$GLB,,, | Performed by: PHYSICIAN ASSISTANT

## 2020-01-30 PROCEDURE — 3079F DIAST BP 80-89 MM HG: CPT | Mod: S$GLB,,, | Performed by: PHYSICIAN ASSISTANT

## 2020-01-30 PROCEDURE — 1159F MED LIST DOCD IN RCRD: CPT | Mod: S$GLB,,, | Performed by: PHYSICIAN ASSISTANT

## 2020-01-30 PROCEDURE — 99999 PR PBB SHADOW E&M-EST. PATIENT-LVL V: CPT | Mod: PBBFAC,,, | Performed by: PHYSICIAN ASSISTANT

## 2020-01-30 PROCEDURE — 1126F AMNT PAIN NOTED NONE PRSNT: CPT | Mod: S$GLB,,, | Performed by: PHYSICIAN ASSISTANT

## 2020-01-30 PROCEDURE — 3075F PR MOST RECENT SYSTOLIC BLOOD PRESS GE 130-139MM HG: ICD-10-PCS | Mod: S$GLB,,, | Performed by: PHYSICIAN ASSISTANT

## 2020-01-30 PROCEDURE — 99214 OFFICE O/P EST MOD 30 MIN: CPT | Mod: S$GLB,,, | Performed by: PHYSICIAN ASSISTANT

## 2020-01-30 PROCEDURE — 1101F PR PT FALLS ASSESS DOC 0-1 FALLS W/OUT INJ PAST YR: ICD-10-PCS | Mod: S$GLB,,, | Performed by: PHYSICIAN ASSISTANT

## 2020-01-30 NOTE — PROGRESS NOTES
Subjective:       Patient ID: Ayla Dela Cruz is a 74 y.o. female.    Chief Complaint: Annual Exam    Patient is new to me.  PCP is Dr. Jan Olivo whom she has not seen in approximately 4 years.  Patient is scheduled today for an annual exam however she was hospitalized 1 week ago for sepsis.  After discussion with patient it is decided that we will focus today's visit on her recent hospitalization.  Patient is a 74-year-old  female with past medical history significant for hypertension, paroxysmal atrial fibrillation, anxiety disorder, gastroesophageal reflux disease and prior history of cerebrovascular accident who was admitted to the hospital for complaint of progressively worsening generalized body aches and pains associated with nausea and fever.  Patient's symptoms started about 2 days prior to hospital admission  Patient was seen by nursing practitioner at primary care physician office and was directed to come to the emergency room.  Patient noted to have 102 degree F on day prior.     Patient was admitted to medicine telemetry floor.  Patient was provided supportive care with IV fluid hydration, antiemetics and use of acetaminophen.  Patient's symptoms improved.  Patient is tolerating diet.  Patient was anxious to go home.  Patient was encouraged to keep herself well hydrated and avoid physical exertion.  Since hospital discharge she has continued to improve.  She states that her appetite has improved.  Her energy level is improving.  She has not had a fever.  She does continue to cough yellow-green sputum.  She states that when she blows her nose there is a clear bloody discharge. She is taking over-the-counter Mucinex in addition to her normal daily medications.  Patients patient medical/surgical, social and family histories have been reviewed       Review of Systems   Constitutional: Negative for activity change, appetite change, chills, diaphoresis, fatigue, fever and unexpected weight  change.   HENT: Positive for congestion and nosebleeds. Negative for hearing loss, rhinorrhea and trouble swallowing.    Eyes: Negative for discharge and visual disturbance.   Respiratory: Positive for cough. Negative for chest tightness, shortness of breath and wheezing.    Cardiovascular: Negative for chest pain and palpitations.   Gastrointestinal: Negative for blood in stool, constipation, diarrhea and vomiting.   Endocrine: Negative for polydipsia and polyuria.   Genitourinary: Negative for difficulty urinating, dysuria, hematuria and menstrual problem.   Musculoskeletal: Negative for arthralgias, joint swelling and neck pain.   Neurological: Negative for weakness and headaches.   Psychiatric/Behavioral: Negative for confusion and dysphoric mood.       Objective:      Physical Exam   Constitutional: She appears well-developed and well-nourished. She is cooperative. No distress.   HENT:   Head: Normocephalic and atraumatic.   Right Ear: Tympanic membrane, external ear and ear canal normal.   Left Ear: Tympanic membrane, external ear and ear canal normal.   Nose: No mucosal edema or rhinorrhea. Right sinus exhibits no maxillary sinus tenderness and no frontal sinus tenderness. Left sinus exhibits no maxillary sinus tenderness and no frontal sinus tenderness.   Mouth/Throat: No oropharyngeal exudate, posterior oropharyngeal edema or posterior oropharyngeal erythema.   Cardiovascular: Normal rate, regular rhythm and normal heart sounds.   Pulmonary/Chest: Effort normal. She has decreased breath sounds. She has wheezes (faint end-expiratory wheeze bilateral upper airways). Chest wall is not dull to percussion. She exhibits no tenderness.   Musculoskeletal:        Right lower leg: She exhibits no edema.        Left lower leg: She exhibits no edema.   Lymphadenopathy:        Head (right side): No tonsillar adenopathy present.        Head (left side): No tonsillar adenopathy present.     She has no cervical adenopathy.    Neurological: She is alert.   Skin: Skin is warm and dry. No cyanosis. Nails show no clubbing.   Vitals reviewed.      Assessment:       1. Hospital discharge follow-up    2. URI, acute    3. Essential hypertension    4. Hypomagnesemia    5. Hypophosphatemia    6. Interstitial lung disease        Plan:       Ayla was seen today for annual exam.    Diagnoses and all orders for this visit:    Hospital discharge follow-up  -     Comprehensive metabolic panel; Future  -     PHOSPHORUS; Future  -     Magnesium; Future    URI, acute, improving   Continue mucinex and add nasal saline 2 spray 3-4 times daily     Essential hypertension, bp at goal   - due for lab, will order and have patient followup with pcp       Lipid panel; Future  -     Comprehensive metabolic panel; Future    Hypomagnesemia during hospitalization   -     Magnesium; Future    Hypophosphatemia during hospitalization   -     PHOSPHORUS; Future    -   ILD   Continue current med  Recommend xopenex 2 puffs tid with current wheezing            Follow up for fasting lab and follow up PCP for annual next avail .   DISCLAIMER: This note was prepared with [employer] voice recognition transcription software. Garbled syntax, mangled pronouns, and other bizarre constructions may be attributed to that software system

## 2020-01-31 ENCOUNTER — LAB VISIT (OUTPATIENT)
Dept: LAB | Facility: HOSPITAL | Age: 75
End: 2020-01-31
Attending: PHYSICIAN ASSISTANT
Payer: MEDICARE

## 2020-01-31 ENCOUNTER — EXTERNAL CHRONIC CARE MANAGEMENT (OUTPATIENT)
Dept: PRIMARY CARE CLINIC | Facility: CLINIC | Age: 75
End: 2020-01-31
Payer: MEDICARE

## 2020-01-31 ENCOUNTER — OFFICE VISIT (OUTPATIENT)
Dept: CARDIOLOGY | Facility: CLINIC | Age: 75
End: 2020-01-31
Payer: MEDICARE

## 2020-01-31 VITALS
BODY MASS INDEX: 20 KG/M2 | OXYGEN SATURATION: 94 % | SYSTOLIC BLOOD PRESSURE: 134 MMHG | HEIGHT: 67 IN | WEIGHT: 127.44 LBS | DIASTOLIC BLOOD PRESSURE: 61 MMHG | HEART RATE: 69 BPM

## 2020-01-31 DIAGNOSIS — I48.0 PAF (PAROXYSMAL ATRIAL FIBRILLATION): ICD-10-CM

## 2020-01-31 DIAGNOSIS — E83.39 HYPOPHOSPHATEMIA: ICD-10-CM

## 2020-01-31 DIAGNOSIS — G45.9 TIA (TRANSIENT ISCHEMIC ATTACK): ICD-10-CM

## 2020-01-31 DIAGNOSIS — I10 ESSENTIAL HYPERTENSION: ICD-10-CM

## 2020-01-31 DIAGNOSIS — E78.5 HYPERLIPIDEMIA, UNSPECIFIED HYPERLIPIDEMIA TYPE: ICD-10-CM

## 2020-01-31 DIAGNOSIS — I48.0 PAROXYSMAL ATRIAL FIBRILLATION: Primary | ICD-10-CM

## 2020-01-31 DIAGNOSIS — Z86.73 H/O: CVA (CEREBROVASCULAR ACCIDENT): ICD-10-CM

## 2020-01-31 DIAGNOSIS — E83.42 HYPOMAGNESEMIA: ICD-10-CM

## 2020-01-31 DIAGNOSIS — Z86.79 S/P ABLATION OF ATRIAL FLUTTER: ICD-10-CM

## 2020-01-31 DIAGNOSIS — Z79.01 ANTICOAGULANT LONG-TERM USE: ICD-10-CM

## 2020-01-31 DIAGNOSIS — Z09 HOSPITAL DISCHARGE FOLLOW-UP: ICD-10-CM

## 2020-01-31 DIAGNOSIS — N28.0 RENAL INFARCT: ICD-10-CM

## 2020-01-31 DIAGNOSIS — Z98.890 S/P ABLATION OF ATRIAL FLUTTER: ICD-10-CM

## 2020-01-31 LAB
ALBUMIN SERPL BCP-MCNC: 3.2 G/DL (ref 3.5–5.2)
ALP SERPL-CCNC: 71 U/L (ref 55–135)
ALT SERPL W/O P-5'-P-CCNC: 7 U/L (ref 10–44)
ANION GAP SERPL CALC-SCNC: 10 MMOL/L (ref 8–16)
AST SERPL-CCNC: 18 U/L (ref 10–40)
BILIRUB SERPL-MCNC: 0.9 MG/DL (ref 0.1–1)
BUN SERPL-MCNC: 11 MG/DL (ref 8–23)
CALCIUM SERPL-MCNC: 9.5 MG/DL (ref 8.7–10.5)
CHLORIDE SERPL-SCNC: 108 MMOL/L (ref 95–110)
CHOLEST SERPL-MCNC: 142 MG/DL (ref 120–199)
CHOLEST/HDLC SERPL: 3.4 {RATIO} (ref 2–5)
CO2 SERPL-SCNC: 23 MMOL/L (ref 23–29)
CREAT SERPL-MCNC: 0.9 MG/DL (ref 0.5–1.4)
EST. GFR  (AFRICAN AMERICAN): >60 ML/MIN/1.73 M^2
EST. GFR  (NON AFRICAN AMERICAN): >60 ML/MIN/1.73 M^2
GLUCOSE SERPL-MCNC: 108 MG/DL (ref 70–110)
HDLC SERPL-MCNC: 42 MG/DL (ref 40–75)
HDLC SERPL: 29.6 % (ref 20–50)
LDLC SERPL CALC-MCNC: 82.4 MG/DL (ref 63–159)
MAGNESIUM SERPL-MCNC: 1.7 MG/DL (ref 1.6–2.6)
NONHDLC SERPL-MCNC: 100 MG/DL
PHOSPHATE SERPL-MCNC: 4 MG/DL (ref 2.7–4.5)
POTASSIUM SERPL-SCNC: 3.9 MMOL/L (ref 3.5–5.1)
PROT SERPL-MCNC: 6.7 G/DL (ref 6–8.4)
SODIUM SERPL-SCNC: 141 MMOL/L (ref 136–145)
TRIGL SERPL-MCNC: 88 MG/DL (ref 30–150)

## 2020-01-31 PROCEDURE — 99999 PR PBB SHADOW E&M-EST. PATIENT-LVL IV: ICD-10-PCS | Mod: PBBFAC,,, | Performed by: INTERNAL MEDICINE

## 2020-01-31 PROCEDURE — 84100 ASSAY OF PHOSPHORUS: CPT

## 2020-01-31 PROCEDURE — 3078F PR MOST RECENT DIASTOLIC BLOOD PRESSURE < 80 MM HG: ICD-10-PCS | Mod: S$GLB,,, | Performed by: INTERNAL MEDICINE

## 2020-01-31 PROCEDURE — 99490 PR CHRONIC CARE MGMT, 1ST 20 MIN: ICD-10-PCS | Mod: S$GLB,,, | Performed by: FAMILY MEDICINE

## 2020-01-31 PROCEDURE — 80061 LIPID PANEL: CPT

## 2020-01-31 PROCEDURE — 83735 ASSAY OF MAGNESIUM: CPT

## 2020-01-31 PROCEDURE — 3075F PR MOST RECENT SYSTOLIC BLOOD PRESS GE 130-139MM HG: ICD-10-PCS | Mod: S$GLB,,, | Performed by: INTERNAL MEDICINE

## 2020-01-31 PROCEDURE — 3078F DIAST BP <80 MM HG: CPT | Mod: S$GLB,,, | Performed by: INTERNAL MEDICINE

## 2020-01-31 PROCEDURE — 93005 ELECTROCARDIOGRAM TRACING: CPT | Mod: S$GLB,,, | Performed by: INTERNAL MEDICINE

## 2020-01-31 PROCEDURE — 93010 ELECTROCARDIOGRAM REPORT: CPT | Mod: S$GLB,,, | Performed by: INTERNAL MEDICINE

## 2020-01-31 PROCEDURE — 99214 PR OFFICE/OUTPT VISIT, EST, LEVL IV, 30-39 MIN: ICD-10-PCS | Mod: S$GLB,,, | Performed by: INTERNAL MEDICINE

## 2020-01-31 PROCEDURE — 1101F PT FALLS ASSESS-DOCD LE1/YR: CPT | Mod: S$GLB,,, | Performed by: INTERNAL MEDICINE

## 2020-01-31 PROCEDURE — 1159F MED LIST DOCD IN RCRD: CPT | Mod: S$GLB,,, | Performed by: INTERNAL MEDICINE

## 2020-01-31 PROCEDURE — 1101F PR PT FALLS ASSESS DOC 0-1 FALLS W/OUT INJ PAST YR: ICD-10-PCS | Mod: S$GLB,,, | Performed by: INTERNAL MEDICINE

## 2020-01-31 PROCEDURE — 1159F PR MEDICATION LIST DOCUMENTED IN MEDICAL RECORD: ICD-10-PCS | Mod: S$GLB,,, | Performed by: INTERNAL MEDICINE

## 2020-01-31 PROCEDURE — 93010 EKG 12-LEAD: ICD-10-PCS | Mod: S$GLB,,, | Performed by: INTERNAL MEDICINE

## 2020-01-31 PROCEDURE — 99490 CHRNC CARE MGMT STAFF 1ST 20: CPT | Mod: S$GLB,,, | Performed by: FAMILY MEDICINE

## 2020-01-31 PROCEDURE — 80053 COMPREHEN METABOLIC PANEL: CPT

## 2020-01-31 PROCEDURE — 99999 PR PBB SHADOW E&M-EST. PATIENT-LVL IV: CPT | Mod: PBBFAC,,, | Performed by: INTERNAL MEDICINE

## 2020-01-31 PROCEDURE — 36415 COLL VENOUS BLD VENIPUNCTURE: CPT | Mod: PO

## 2020-01-31 PROCEDURE — 93005 EKG 12-LEAD: ICD-10-PCS | Mod: S$GLB,,, | Performed by: INTERNAL MEDICINE

## 2020-01-31 PROCEDURE — 99214 OFFICE O/P EST MOD 30 MIN: CPT | Mod: S$GLB,,, | Performed by: INTERNAL MEDICINE

## 2020-01-31 PROCEDURE — 1126F PR PAIN SEVERITY QUANTIFIED, NO PAIN PRESENT: ICD-10-PCS | Mod: S$GLB,,, | Performed by: INTERNAL MEDICINE

## 2020-01-31 PROCEDURE — 1126F AMNT PAIN NOTED NONE PRSNT: CPT | Mod: S$GLB,,, | Performed by: INTERNAL MEDICINE

## 2020-01-31 PROCEDURE — 3075F SYST BP GE 130 - 139MM HG: CPT | Mod: S$GLB,,, | Performed by: INTERNAL MEDICINE

## 2020-01-31 RX ORDER — TRIAMCINOLONE ACETONIDE 1 MG/G
OINTMENT TOPICAL
Status: ON HOLD | COMMUNITY
Start: 2019-12-30 | End: 2020-03-10

## 2020-01-31 NOTE — LETTER
January 31, 2020      Barrett Jurado MD  [address]  [address]  [place] [address]           [facility]  [address]  [place] [address]  Phone: [phone]          Patient: Ayla Dela Cruz   MR Number: [id number]   YOB: 1945   Date of Visit: 1/31/2020       Dear Dr. Barrett Jurado:    Thank you for referring Ayla Dela Cruz to me for evaluation. Attached you will find relevant portions of my assessment and plan of care.    If you have questions, please do not hesitate to call me. I look forward to following Ayla Dela Cruz along with you.    Sincerely,    Fco Calderon MD    Enclosure  CC:  No Recipients    If you would like to receive this communication electronically, please contact [email or web address] or [phone] to request more information on [facility] access.    For providers and/or their staff who would like to refer a patient to [facility], please contact us through our one-stop-shop provider referral line, [facility], at [phone].    If you feel you have received this communication in error or would no longer like to receive these types of communications, please e-mail [email or web address]

## 2020-02-12 ENCOUNTER — HOSPITAL ENCOUNTER (OUTPATIENT)
Dept: RADIOLOGY | Facility: HOSPITAL | Age: 75
Discharge: HOME | End: 2020-02-12
Attending: OBSTETRICS & GYNECOLOGY
Payer: MEDICARE

## 2020-02-12 DIAGNOSIS — Z12.31 OTHER SCREENING MAMMOGRAM: ICD-10-CM

## 2020-02-12 PROCEDURE — 77067 MAMMO DIGITAL SCREENING BILAT WITH TOMOSYNTHESIS_CAD: ICD-10-PCS | Mod: 26,,, | Performed by: RADIOLOGY

## 2020-02-12 PROCEDURE — 77063 BREAST TOMOSYNTHESIS BI: CPT | Mod: 26,,, | Performed by: RADIOLOGY

## 2020-02-12 PROCEDURE — 77063 MAMMO DIGITAL SCREENING BILAT WITH TOMOSYNTHESIS_CAD: ICD-10-PCS | Mod: 26,,, | Performed by: RADIOLOGY

## 2020-02-12 PROCEDURE — 77067 SCR MAMMO BI INCL CAD: CPT | Mod: 26,,, | Performed by: RADIOLOGY

## 2020-02-12 PROCEDURE — 77067 SCR MAMMO BI INCL CAD: CPT | Mod: TC,PO

## 2020-02-22 ENCOUNTER — PATIENT MESSAGE (OUTPATIENT)
Dept: PAIN MEDICINE | Facility: CLINIC | Age: 75
End: 2020-02-22

## 2020-02-24 DIAGNOSIS — M43.06 SPONDYLOLYSIS, LUMBAR REGION: Primary | ICD-10-CM

## 2020-02-29 ENCOUNTER — EXTERNAL CHRONIC CARE MANAGEMENT (OUTPATIENT)
Dept: PRIMARY CARE CLINIC | Facility: CLINIC | Age: 75
End: 2020-02-29
Payer: MEDICARE

## 2020-02-29 PROCEDURE — 99490 PR CHRONIC CARE MGMT, 1ST 20 MIN: ICD-10-PCS | Mod: S$GLB,,, | Performed by: FAMILY MEDICINE

## 2020-02-29 PROCEDURE — 99490 CHRNC CARE MGMT STAFF 1ST 20: CPT | Mod: S$GLB,,, | Performed by: FAMILY MEDICINE

## 2020-03-05 ENCOUNTER — TELEPHONE (OUTPATIENT)
Dept: CARDIOLOGY | Facility: CLINIC | Age: 75
End: 2020-03-05

## 2020-03-09 NOTE — DISCHARGE INSTRUCTIONS
Radiofrequency of Nerves    After this procedure you may have increased pain for three days and lingering pain for up to 6 weeks.   Most patients feel better after 4-6  weeks.    Use your pain medications as needed but the goal of this treartment is to wean you off your pain medication.  You may have weakness after the procedure due to the numbing injection.  You may feel a sunburn effect and some patients may need a burn cream for the skin after 2 days.    Use ice pack for discomfort :apply for 20 minutes, remove for 20 minutes for up to 2 days. Do not sleep with ice pack.  Do not use a heating pad or take tub baths or swim for 2 days.  You may shower today  Gradually increase your activities.  Dont lift anything over 10 lbs for the first 24 hours  Dont drive the day of the procedure Wait 24 hrs to drive.  Wait until tomorrow to resume any blood thinners (aspirin, Plavix, Coumadin) but you may resume all your other medications today.  If Diabetic, monitor you glucose carefully due to steroids  used for this procedure    Seek Medical Attention if you have:  Severe pain or headache  Fever or chills  Redness or swelling around the injection site   Difficulty breathing  Vomiting or Increasing numbness or weakness in arms or legs    After Surgery:  Always be aware that any surgery can cause these symptoms:    Pain- Medication can be prescribed for pain to decrease your pain but may not completely take your pain away.  Over the Counter pain medicine my be enough and you can always use Ice and rest to help ease pain.    Bleeding- a little bleeding after a surgery is usually within normal.  If there is a lot of blood you need to notify your MD.  Emergency treatments of bleeding are cold application, elevation of the bleeding site and compression.    Infection- Infection after surgery is NOT a normal occurrence.  Signs of infection are fever, swelling, hot to touch the incision.  If this occurs notify your MD  immediately.    Nausea- this can be common after a surgery especially if you have had anesthesia medicine or are taking pain medicine. The steroid the Dr injected can have a side effect of Nausea.  Staying on clear liquids, bland foods, gingerale, or over the counter anti nausea medicines can help.  If you vomit more than once, notify your MD.  Anti Nausea medicines can be prescribed.

## 2020-03-10 ENCOUNTER — PATIENT OUTREACH (OUTPATIENT)
Dept: ADMINISTRATIVE | Facility: OTHER | Age: 75
End: 2020-03-10

## 2020-03-10 ENCOUNTER — HOSPITAL ENCOUNTER (OUTPATIENT)
Facility: AMBULARY SURGERY CENTER | Age: 75
Discharge: HOME | End: 2020-03-10
Attending: ANESTHESIOLOGY | Admitting: ANESTHESIOLOGY
Payer: MEDICARE

## 2020-03-10 DIAGNOSIS — M47.896 OTHER SPONDYLOSIS, LUMBAR REGION: ICD-10-CM

## 2020-03-10 DIAGNOSIS — M48.8X6 OTHER SPECIFIED SPONDYLOPATHIES, LUMBAR REGION: Primary | ICD-10-CM

## 2020-03-10 PROCEDURE — 64635 PR DESTROY LUMB/SAC FACET JNT: ICD-10-PCS | Mod: 50,,, | Performed by: ANESTHESIOLOGY

## 2020-03-10 PROCEDURE — 64636 DESTROY L/S FACET JNT ADDL: CPT | Mod: LT,,, | Performed by: ANESTHESIOLOGY

## 2020-03-10 PROCEDURE — 64635 DESTROY LUMB/SAC FACET JNT: CPT | Mod: 50,,, | Performed by: ANESTHESIOLOGY

## 2020-03-10 PROCEDURE — 64636 PR DESTROY L/S FACET JNT ADDL: ICD-10-PCS | Mod: RT,,, | Performed by: ANESTHESIOLOGY

## 2020-03-10 PROCEDURE — 99152 PR MOD CONSCIOUS SEDATION, SAME PHYS, 5+ YRS, FIRST 15 MIN: ICD-10-PCS | Mod: ,,, | Performed by: ANESTHESIOLOGY

## 2020-03-10 PROCEDURE — 64635 DESTROY LUMB/SAC FACET JNT: CPT | Mod: LT | Performed by: ANESTHESIOLOGY

## 2020-03-10 PROCEDURE — 99152 MOD SED SAME PHYS/QHP 5/>YRS: CPT | Mod: ,,, | Performed by: ANESTHESIOLOGY

## 2020-03-10 PROCEDURE — 64636 DESTROY L/S FACET JNT ADDL: CPT | Mod: RT | Performed by: ANESTHESIOLOGY

## 2020-03-10 RX ORDER — METHYLPREDNISOLONE ACETATE 80 MG/ML
INJECTION, SUSPENSION INTRA-ARTICULAR; INTRALESIONAL; INTRAMUSCULAR; SOFT TISSUE
Status: DISCONTINUED | OUTPATIENT
Start: 2020-03-10 | End: 2020-03-10 | Stop reason: HOSPADM

## 2020-03-10 RX ORDER — BUPIVACAINE HYDROCHLORIDE 2.5 MG/ML
INJECTION, SOLUTION EPIDURAL; INFILTRATION; INTRACAUDAL
Status: DISCONTINUED | OUTPATIENT
Start: 2020-03-10 | End: 2020-03-10 | Stop reason: HOSPADM

## 2020-03-10 RX ORDER — SODIUM CHLORIDE, SODIUM LACTATE, POTASSIUM CHLORIDE, CALCIUM CHLORIDE 600; 310; 30; 20 MG/100ML; MG/100ML; MG/100ML; MG/100ML
INJECTION, SOLUTION INTRAVENOUS ONCE AS NEEDED
Status: COMPLETED | OUTPATIENT
Start: 2020-03-10 | End: 2020-03-10

## 2020-03-10 RX ORDER — LIDOCAINE HYDROCHLORIDE 10 MG/ML
INJECTION, SOLUTION EPIDURAL; INFILTRATION; INTRACAUDAL; PERINEURAL
Status: DISCONTINUED | OUTPATIENT
Start: 2020-03-10 | End: 2020-03-10 | Stop reason: HOSPADM

## 2020-03-10 RX ORDER — LIDOCAINE HYDROCHLORIDE 20 MG/ML
INJECTION, SOLUTION EPIDURAL; INFILTRATION; INTRACAUDAL; PERINEURAL
Status: DISCONTINUED | OUTPATIENT
Start: 2020-03-10 | End: 2020-03-10 | Stop reason: HOSPADM

## 2020-03-10 RX ORDER — MIDAZOLAM HYDROCHLORIDE 2 MG/2ML
INJECTION, SOLUTION INTRAMUSCULAR; INTRAVENOUS
Status: DISCONTINUED | OUTPATIENT
Start: 2020-03-10 | End: 2020-03-10 | Stop reason: HOSPADM

## 2020-03-10 RX ORDER — FENTANYL CITRATE 50 UG/ML
INJECTION, SOLUTION INTRAMUSCULAR; INTRAVENOUS
Status: DISCONTINUED | OUTPATIENT
Start: 2020-03-10 | End: 2020-03-10 | Stop reason: HOSPADM

## 2020-03-10 RX ADMIN — SODIUM CHLORIDE, SODIUM LACTATE, POTASSIUM CHLORIDE, CALCIUM CHLORIDE: 600; 310; 30; 20 INJECTION, SOLUTION INTRAVENOUS at 09:03

## 2020-03-10 NOTE — DISCHARGE SUMMARY
[facility]  Discharge Note  Short Stay    Admit Date: 3/10/2020    Discharge Date and Time: 3/10/2020    Attending Physician: Galo Clancy MD     Discharge Provider: Galo Clancy    Diagnoses:  Active Hospital Problems    Diagnosis  POA    *Other spondylosis, lumbar region [M47.896]  Yes      Resolved Hospital Problems   No resolved problems to display.       Hospital Course: Lumbar MB RFA  Discharged Condition: Good    Final Diagnoses:   Active Hospital Problems    Diagnosis  POA    *Other spondylosis, lumbar region [M47.896]  Yes      Resolved Hospital Problems   No resolved problems to display.       Disposition: Home or Self Care    Follow up/Patient Instructions:    Medications:  Reconciled Home Medications:      Medication List      CHANGE how you take these medications    gabapentin 300 MG capsule  Commonly known as:  NEURONTIN  Take 1 capsule (300 mg total) by mouth 3 (three) times daily.  What changed:    · how much to take  · when to take this        CONTINUE taking these medications    ammonium lactate 12 % lotion  Commonly known as:  LAC-HYDRIN     ATROPINE SULFATE (PF) OPHT  Place into the right eye once daily.     COSOPT 22.3-6.8 mg/mL ophthalmic solution  Generic drug:  dorzolamide-timolol 2-0.5%  Place 1 drop into both eyes 2 (two) times daily. Twice a day     fluticasone furoate-vilanteroL 200-25 mcg/dose Dsdv diskus inhaler  Commonly known as:  BREO ELLIPTA  Inhale 1 puff into the lungs once daily.     fluticasone propionate 50 mcg/actuation nasal spray  Commonly known as:  FLONASE  1 spray (50 mcg total) by Each Nare route once daily.     levalbuterol 45 mcg/actuation inhaler  Commonly known as:  XOPENEX HFA  Inhale 1-2 puffs into the lungs every 4 (four) hours as needed for Wheezing. This is a rescue inhaler medication and should be used as needed     LORazepam 0.5 MG tablet  Commonly known as:  ATIVAN  Take 0.5 mg by mouth every 12 (twelve) hours as needed for Anxiety.     metoprolol  tartrate 50 MG tablet  Commonly known as:  LOPRESSOR  TAKE 1 TABLET (50 MG TOTAL) BY MOUTH 2 (TWO) TIMES DAILY.     pantoprazole 40 MG tablet  Commonly known as:  PROTONIX  Take 1 tablet (40 mg total) by mouth once daily.     PAZEO 0.7 % Drop  Generic drug:  olopatadine  INSTILL 1 DROP INTO BOTH EYES EVERY DAY     promethazine 12.5 MG Supp  Commonly known as:  PHENERGAN  Place 1 suppository (12.5 mg total) rectally every 6 (six) hours as needed.     rosuvastatin 40 MG Tab  Commonly known as:  CRESTOR  TAKE 1 TABLET (40 MG TOTAL) BY MOUTH EVERY EVENING.     SAVAYSA 60 mg Tab tablet  Generic drug:  edoxaban  Take by mouth once daily.     VIIBRYD 40 mg Tab tablet  Generic drug:  vilazodone  Take 40 mg by mouth once daily.          Discharge Procedure Orders   Call MD for:  temperature >100.4     Call MD for:  persistent nausea and vomiting or diarrhea     Call MD for:  severe uncontrolled pain     Call MD for:  redness, tenderness, or signs of infection (pain, swelling, redness, odor or green/yellow discharge around incision site)     Call MD for:  difficulty breathing or increased cough     Call MD for:  severe persistent headache        Follow up with MD in 2-3 weeks    Discharge Procedure Orders (must include Diet, Follow-up, Activity):   Discharge Procedure Orders (must include Diet, Follow-up, Activity)   Call MD for:  temperature >100.4     Call MD for:  persistent nausea and vomiting or diarrhea     Call MD for:  severe uncontrolled pain     Call MD for:  redness, tenderness, or signs of infection (pain, swelling, redness, odor or green/yellow discharge around incision site)     Call MD for:  difficulty breathing or increased cough     Call MD for:  severe persistent headache

## 2020-03-10 NOTE — OP NOTE
PROCEDURE DATE: 3/10/2020    PROCEDURE:  Radiofrequency ablation of the L3,4,5 medial branch nerves on the bilateral-side utilizing fluoroscopy    DIAGNOSIS:  Other lumbar spondylosis  Post op Diagnosis: Same    PHYSICIAN: Galo Clancy MD    MEDICATIONS INJECTED:  From a mixture of 6ml of 0.25% bupivicaine and 80mg of methylprednisone,  1ml of this solution was injected at each level.    LOCAL ANESTHETIC USED: Lidocaine 1%, 2 ml given at each site.    SEDATION MEDICATIONS: RN IV sedation    ESTIMATED BLOOD LOSS:  None    COMPLICATIONS:  None    TECHNIQUE:  A time out was taken to identify patient and procedure side prior to starting the procedure. Laying in a prone position, the patient was prepped and draped in the usual sterile fashion using ChloraPrep and sterile towels.  The levels were determined under fluoroscopic guidance and then marked.  Local anesthetic was given by raising a wheal at the skin over each site and then infiltrated approximately 2cm deeper.  A 20-gauge  100 mm RF needle was introduced to the anatomic location of the bilateral L3,4,5 medial branch nerves. Motor stimulation up to 2 Volts at each level confirmed no motor nerve involvement.  Impedance was less than 800 ohms at each level.  1ml of 2% lidocaine was instilled prior to lesioning.  Ablation was performed per level utilizing radiofrequency generator 80°C for 60 seconds.  Needles were then rotated 90° and a second lesion was performed.  The above noted medication was then injected slowly. The patient tolerated the procedure well.     The patient was monitored after the procedure.  Patient was given post procedure and discharge instructions to follow at home.  The patient was discharged in a stable condition

## 2020-03-10 NOTE — PLAN OF CARE
Stable states ready to go home, gaurav po fluids, denies pain, no leg weakness but dizzy, to car per wc with RN to

## 2020-03-10 NOTE — H&P
CC: low back pain    HPI: The patient is a 74 y.o. female with a history of lumbar spondylosis here for lumbar MB RFa. There are no major changes in history and physical from 1/30/2020 by [facility].    Past Medical History:   Diagnosis Date    Anticoagulant long-term use     Anxiety     Arthritis     Atrial fibrillation     Cancer     skin    CHF (congestive heart failure)     Depression     DVT (deep venous thrombosis)     GERD (gastroesophageal reflux disease)     Glaucoma (increased eye pressure)     Hyperlipidemia     diet controlled    Hypertension     Interstitial lung disease     Localized hives 1/10/2020    Mild persistent asthma without complication 11/12/2018    Pneumonia 1/31/2014    Stroke 6-3-14    Stroke     TIA (transient ischemic attack)     TIA (transient ischemic attack)        Past Surgical History:   Procedure Laterality Date    2 heart ablations      3 in total    bilateral cataracts      CHOLECYSTECTOMY      COLONOSCOPY N/A 1/19/2017    Procedure: COLONOSCOPY and EGD;  Surgeon: Jonathan Schultz MD;  Location: [facility];  Service: Endoscopy;  Laterality: N/A;    COLONOSCOPY N/A 10/10/2019    Procedure: COLONOSCOPY;  Surgeon: Alex Christian MD;  Location: [facility];  Service: Endoscopy;  Laterality: N/A;    ESOPHAGOGASTRODUODENOSCOPY N/A 10/14/2019    Procedure: EGD (ESOPHAGOGASTRODUODENOSCOPY);  Surgeon: Alex Christian MD;  Location: [facility];  Service: Endoscopy;  Laterality: N/A;    INJECTION OF ANESTHETIC AGENT AROUND MEDIAL BRANCH NERVES INNERVATING CERVICAL FACET JOINT Right 6/7/2018    Procedure: BLOCK, NERVE, FACET JOINT, MEDIAL BRANCH, CERVICAL;  Surgeon: Gaol Clancy MD;  Location: [facility] OR;  Service: Pain Management;  Laterality: Right;  C4, 5, 6    OPEN REDUCTION AND INTERNAL FIXATION (ORIF) OF INJURY OF ANKLE Right 11/1/2018    Procedure: ORIF, ANKLE;  Surgeon: Wilfredo Aguero MD;  Location: [facility];  Service: Orthopedics;  Laterality: Right;     pyloristenosis      RADIOFREQUENCY ABLATION OF LUMBAR MEDIAL BRANCH NERVE AT SINGLE LEVEL Bilateral 9/21/2018    Procedure: RADIOFREQUENCY ABLATION, NERVE, SPINAL, LUMBAR, MEDIAL BRANCH, 1 LEVEL;  Surgeon: Galo Clancy MD;  Location: [facility] OR;  Service: Pain Management;  Laterality: Bilateral;  L3, 4, 5    RADIOFREQUENCY ABLATION OF LUMBAR MEDIAL BRANCH NERVE AT SINGLE LEVEL Bilateral 2/19/2019    Procedure: Radiofrequency Ablation, Nerve, Spinal, Lumbar, Medial Branch, 1 Level;  Surgeon: Galo Clancy MD;  Location: [facility] OR;  Service: Pain Management;  Laterality: Bilateral;  L3, 4, 5     RADIOFREQUENCY THERMAL COAGULATION OF MEDIAL BRANCH OF POSTERIOR RAMUS OF CERVICAL SPINAL NERVE Right 7/3/2018    Procedure: RADIOFREQUENCY THERMAL COAGULATION, NERVE, SPINAL, CERVICAL, MEDIAL BRANCH OF POSTERIOR RAMUS;  Surgeon: Galo Clancy MD;  Location: [facility] OR;  Service: Pain Management;  Laterality: Right;  C4,5,6 - Burned at 80 degrees C. for 75 seconds each site    RADIOFREQUENCY THERMAL COAGULATION OF MEDIAL BRANCH OF POSTERIOR RAMUS OF CERVICAL SPINAL NERVE Right 7/23/2019    Procedure: RADIOFREQUENCY THERMAL COAGULATION, NERVE, SPINAL, CERVICAL, POSTERIOR RAMUS, MEDIAL BRANCH;  Surgeon: Galo Clancy MD;  Location: [facility] OR;  Service: Pain Management;  Laterality: Right;  C4,5,6    RADIOFREQUENCY THERMOCOAGULATION Bilateral 9/10/2019    Procedure: RADIOFREQUENCY THERMAL COAGULATION LUMBAR;  Surgeon: Galo Clancy MD;  Location: [facility] OR;  Service: Pain Management;  Laterality: Bilateral;  L3,4,5 - Burned at 80 degrees C. for 60 seconds x 2 each site    skin cancer removal       TONSILLECTOMY         Family History   Problem Relation Age of Onset    Heart disease Father     Ulcers Father     Arthritis Mother     Asthma Mother     Rheum arthritis Mother     Pneumonia Mother     Depression Son     Alzheimer's disease Maternal Uncle     Rheum arthritis Maternal Grandmother     Emphysema Maternal Grandfather      Cancer Maternal Grandfather         kidney    Kidney disease Maternal Grandfather     Cancer Paternal Grandmother         lung= smoker    Pneumonia Paternal Grandfather     Breast cancer Neg Hx     Ovarian cancer Neg Hx        Social History     Socioeconomic History    Marital status:      Spouse name: Not on file    Number of children: Not on file    Years of education: Not on file    Highest education level: Not on file   Occupational History    Not on file   Social Needs    Financial resource strain: Not hard at all    Food insecurity:     Worry: Never true     Inability: Never true    Transportation needs:     Medical: No     Non-medical: No   Tobacco Use    Smoking status: Never Smoker    Smokeless tobacco: Never Used   Substance and Sexual Activity    Alcohol use: No     Frequency: Monthly or less     Drinks per session: 1 or 2     Binge frequency: Never    Drug use: No    Sexual activity: Yes     Partners: Male   Lifestyle    Physical activity:     Days per week: 0 days     Minutes per session: 0 min    Stress: To some extent   Relationships    Social connections:     Talks on phone: Three times a week     Gets together: Once a week     Attends Orthodox service: Not on file     Active member of club or organization: Yes     Attends meetings of clubs or organizations: More than 4 times per year     Relationship status:    Other Topics Concern    Not on file   Social History Narrative    Not on file       Current Facility-Administered Medications   Medication Dose Route Frequency Provider Last Rate Last Dose    lactated ringers infusion   Intravenous Once PRN Galo Clancy MD         Facility-Administered Medications Ordered in Other Encounters   Medication Dose Route Frequency Provider Last Rate Last Dose    bupivacaine (PF) 0.25% (2.5 mg/ml) injection    PRN Galo Clancy MD   6 mL at 02/19/19 1314    lidocaine (PF) 10 mg/ml (1%) injection    PRN Galo Clancy MD   9 mL at  "02/19/19 1304    lidocaine (PF) 20 mg/ml (2%) injection    PRN Galo Clancy MD   6 mL at 02/19/19 1310    methylPREDNISolone acetate injection    PRN Galo Clancy MD   80 mg at 02/19/19 1314       Review of patient's allergies indicates:   Allergen Reactions    Xarelto [rivaroxaban] Rash    Bactrim [sulfamethoxazole-trimethoprim] Other (See Comments)     Upset stomach, dry heaves, confusion    Atorvastatin      Other reaction(s): Joint pain    Augmentin [amoxicillin-pot clavulanate]      Other reaction(s): Mental Status Change    Baclofen (bulk) Nausea And Vomiting    Ciprofloxacin (bulk)     Decongest tabs      Other reaction(s): increased heart rate    Erythromycin Other (See Comments)    Flecainide Hives     And SOB. No reaction to Lidocaine     Fluoxetine      Other reaction(s): heart palpitations  Other reaction(s): anxiety    Lisinopril Other (See Comments)     cough    Losartan Other (See Comments)     Hypotension with lightheadedness    Morphine Other (See Comments)     confusion    Tramadol Other (See Comments)     SOB, low BP    Venlafaxine analogues      Changes in BP and increases heart rate       Afrin [oxymetazoline] Palpitations    Caffeine Palpitations    Pradaxa [dabigatran etexilate] Rash    Tizanidine Anxiety     dizziness       Vitals:    03/06/20 1306   Weight: 57.6 kg (127 lb)   Height: 5' 7" (1.702 m)       REVIEW OF SYSTEMS:     GENERAL: No weight loss, malaise or fevers.  HEENT:  No recent changes in vision or hearing  NECK: Negative for lumps, no difficulty with swallowing.  RESPIRATORY: Negative for cough, wheezing or shortness of breath, patient denies any recent URI.  CARDIOVASCULAR: Negative for chest pain, leg swelling or palpitations.  GI: Negative for abdominal discomfort, blood in stools or black stools or change in bowel habits.  MUSCULOSKELETAL: See HPI.  SKIN: Negative for lesions, rash, and itching.  PSYCH: No suicidal or homicidal ideations, no current mood " disturbances.  HEMATOLOGY/LYMPHOLOGY: Negative for prolonged bleeding, bruising easily or swollen nodes. Patient is not currently taking any anti-coagulants  ENDO: No history of diabetes or thyroid dysfunction  NEURO: No history of syncope, paralysis, seizures or tremors.All other reviewed and negative other than HPI.    Physical exam:  Gen: A and O x3, pleasant, well-groomed  Skin: No rashes or obvious lesions  HEENT: PERRLA, no obvious deformities on ears or in canals. No thyroid masses, trachea midline, no palpable lymph nodes in neck, axilla.  CVS: Regular rate and rhythm, normal S1 and S2, no murmurs.  Resp: Clear to auscultation bilaterally.  Abdomen: Soft, NT/ND, normal bowel sounds present.  Musculoskeletal/Neuro: Moving all extremities    Assessment:  Other specified spondylopathies, lumbar region  -     Place in Outpatient; Standing  -     Vital signs; Standing  -     Insert peripheral IV; Standing  -     Verify informed consent; Standing  -     Notify physician ; Standing  -     Notify physician ; Standing  -     Notify physician (specify); Standing  -     Diet NPO; Standing    Other spondylosis, lumbar region    Other orders  -     Progressive Mobility Protocol (mobilize patient to their highest level of functioning at least twice daily); Standing  -     lactated ringers infusion  -     IP VTE LOW RISK PATIENT; Standing          PLAN: Lumbar MB RFA      This patient has been cleared for surgery in an ambulatory surgical facility    ASA 3,  Mallampatti Score 3  No history of anesthetic complications  Plan for RN IV sedation

## 2020-03-11 ENCOUNTER — OFFICE VISIT (OUTPATIENT)
Dept: CARDIOLOGY | Facility: CLINIC | Age: 75
End: 2020-03-11
Payer: MEDICARE

## 2020-03-11 VITALS
OXYGEN SATURATION: 93 % | HEIGHT: 67 IN | SYSTOLIC BLOOD PRESSURE: 152 MMHG | HEART RATE: 73 BPM | DIASTOLIC BLOOD PRESSURE: 68 MMHG | BODY MASS INDEX: 19.41 KG/M2 | WEIGHT: 123.69 LBS

## 2020-03-11 VITALS
DIASTOLIC BLOOD PRESSURE: 81 MMHG | BODY MASS INDEX: 19.93 KG/M2 | TEMPERATURE: 98 F | HEART RATE: 52 BPM | HEIGHT: 67 IN | RESPIRATION RATE: 20 BRPM | OXYGEN SATURATION: 94 % | WEIGHT: 127 LBS | SYSTOLIC BLOOD PRESSURE: 148 MMHG

## 2020-03-11 DIAGNOSIS — Z78.9 MEDICATION INTOLERANCE: ICD-10-CM

## 2020-03-11 DIAGNOSIS — E88.09 HYPOALBUMINEMIA: ICD-10-CM

## 2020-03-11 DIAGNOSIS — L29.9 CHRONIC PRURITUS: Primary | ICD-10-CM

## 2020-03-11 DIAGNOSIS — D50.0 IRON DEFICIENCY ANEMIA DUE TO CHRONIC BLOOD LOSS: ICD-10-CM

## 2020-03-11 DIAGNOSIS — I48.0 PAF (PAROXYSMAL ATRIAL FIBRILLATION): ICD-10-CM

## 2020-03-11 DIAGNOSIS — I48.0 PAROXYSMAL ATRIAL FIBRILLATION: ICD-10-CM

## 2020-03-11 DIAGNOSIS — I10 ESSENTIAL HYPERTENSION: ICD-10-CM

## 2020-03-11 PROCEDURE — 1126F PR PAIN SEVERITY QUANTIFIED, NO PAIN PRESENT: ICD-10-PCS | Mod: S$GLB,,, | Performed by: INTERNAL MEDICINE

## 2020-03-11 PROCEDURE — 1126F AMNT PAIN NOTED NONE PRSNT: CPT | Mod: S$GLB,,, | Performed by: INTERNAL MEDICINE

## 2020-03-11 PROCEDURE — 3077F SYST BP >= 140 MM HG: CPT | Mod: S$GLB,,, | Performed by: INTERNAL MEDICINE

## 2020-03-11 PROCEDURE — 93000 ELECTROCARDIOGRAM COMPLETE: CPT | Mod: S$GLB,,, | Performed by: INTERNAL MEDICINE

## 2020-03-11 PROCEDURE — 3078F DIAST BP <80 MM HG: CPT | Mod: S$GLB,,, | Performed by: INTERNAL MEDICINE

## 2020-03-11 PROCEDURE — 1159F MED LIST DOCD IN RCRD: CPT | Mod: S$GLB,,, | Performed by: INTERNAL MEDICINE

## 2020-03-11 PROCEDURE — 1159F PR MEDICATION LIST DOCUMENTED IN MEDICAL RECORD: ICD-10-PCS | Mod: S$GLB,,, | Performed by: INTERNAL MEDICINE

## 2020-03-11 PROCEDURE — 1101F PR PT FALLS ASSESS DOC 0-1 FALLS W/OUT INJ PAST YR: ICD-10-PCS | Mod: S$GLB,,, | Performed by: INTERNAL MEDICINE

## 2020-03-11 PROCEDURE — 3077F PR MOST RECENT SYSTOLIC BLOOD PRESSURE >= 140 MM HG: ICD-10-PCS | Mod: S$GLB,,, | Performed by: INTERNAL MEDICINE

## 2020-03-11 PROCEDURE — 99214 OFFICE O/P EST MOD 30 MIN: CPT | Mod: 25,S$GLB,, | Performed by: INTERNAL MEDICINE

## 2020-03-11 PROCEDURE — 93000 EKG 12-LEAD: ICD-10-PCS | Mod: S$GLB,,, | Performed by: INTERNAL MEDICINE

## 2020-03-11 PROCEDURE — 3078F PR MOST RECENT DIASTOLIC BLOOD PRESSURE < 80 MM HG: ICD-10-PCS | Mod: S$GLB,,, | Performed by: INTERNAL MEDICINE

## 2020-03-11 PROCEDURE — 99214 PR OFFICE/OUTPT VISIT, EST, LEVL IV, 30-39 MIN: ICD-10-PCS | Mod: 25,S$GLB,, | Performed by: INTERNAL MEDICINE

## 2020-03-11 PROCEDURE — 99999 PR PBB SHADOW E&M-EST. PATIENT-LVL IV: CPT | Mod: PBBFAC,,, | Performed by: INTERNAL MEDICINE

## 2020-03-11 PROCEDURE — 1101F PT FALLS ASSESS-DOCD LE1/YR: CPT | Mod: S$GLB,,, | Performed by: INTERNAL MEDICINE

## 2020-03-11 PROCEDURE — 99999 PR PBB SHADOW E&M-EST. PATIENT-LVL IV: ICD-10-PCS | Mod: PBBFAC,,, | Performed by: INTERNAL MEDICINE

## 2020-03-11 NOTE — PROGRESS NOTES
Subjective:    Patient ID:  Ayla Dela Cruz is a 74 y.o. 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 Olivo, see annually, do labs  EP: Dr. Calderon  Orthopedic: Dr. Aguero  Surgeon: Dr. Gonsalez, and Dr. Dc  Rheumatologist: Dr. Pak  Prior cardiologist: Dr. Gibson, last seen over a year  Pulmonary: Dr. White  Psychologist: Nu Fermin, AGUILAR, in [place]  Gyn: Dr. Jordan  GI: Dr. Schultz  Neurologist: Dr. Ramirez  Dermatologist: Dennis Corrigan  Pain: Dr. Clancy, injections to neck and both hips very helpful  Lives with , Jan, here with patient, non-smoker, history of prostate CA,  52 years on 6/24  daughter, Lyric, source of stress  Restarted , now 2-4 times weekly, still enjoys it, playing the flute    Health literacy: high  Activities: house work, some walking, do not feel limited, have robot vacuums   Nicotine: never  Alcohol: rarely wine  Illicit drugs: no  Cardiac symptoms: none, but still with itching on the back, severe  Home BP: 123/70  Medication compliance: yes  Diet: regular  Caffeine: no  Labs: 1/2019 LDL 90.6 on Crestor 40 mg, normal TSH, CMP (albumin 3.1), normal CBC, Vit. D  10/2019 AST 95, Hgb 11.4  Lab Results   Component Value Date    TSH 0.626 01/29/2019        Lab Results   Component Value Date    HGBA1C 5.9 (H) 01/26/2018       Lab Results   Component Value Date    WBC 8.37 01/24/2020    HGB 11.7 (L) 01/24/2020    HCT 37.0 01/24/2020    MCV 94 01/24/2020     01/24/2020       CMP  Sodium   Date Value Ref Range Status   01/31/2020 141 136 - 145 mmol/L Final     Potassium   Date Value Ref Range Status   01/31/2020 3.9 3.5 - 5.1 mmol/L Final     Chloride   Date Value Ref Range Status   01/31/2020 108 95 - 110 mmol/L Final     CO2   Date Value Ref Range  Status   01/31/2020 23 23 - 29 mmol/L Final     Glucose   Date Value Ref Range Status   01/31/2020 108 70 - 110 mg/dL Final     BUN, Bld   Date Value Ref Range Status   01/31/2020 11 8 - 23 mg/dL Final     Creatinine   Date Value Ref Range Status   01/31/2020 0.9 0.5 - 1.4 mg/dL Final   09/24/2012 0.6 0.2 - 1.4 mg/dl Final     Calcium   Date Value Ref Range Status   01/31/2020 9.5 8.7 - 10.5 mg/dL Final   09/24/2012 9.9 8.6 - 10.2 mg/dl Final     Total Protein   Date Value Ref Range Status   01/31/2020 6.7 6.0 - 8.4 g/dL Final     Albumin   Date Value Ref Range Status   01/31/2020 3.2 (L) 3.5 - 5.2 g/dL Final     Total Bilirubin   Date Value Ref Range Status   01/31/2020 0.9 0.1 - 1.0 mg/dL Final     Comment:     For infants and newborns, interpretation of results should be based  on gestational age, weight and in agreement with clinical  observations.  Premature Infant recommended reference ranges:  Up to 24 hours.............<8.0 mg/dL  Up to 48 hours............<12.0 mg/dL  3-5 days..................<15.0 mg/dL  6-29 days.................<15.0 mg/dL       Alkaline Phosphatase   Date Value Ref Range Status   01/31/2020 71 55 - 135 U/L Final   09/24/2012 63 23 - 119 UNIT/L Final     AST   Date Value Ref Range Status   01/31/2020 18 10 - 40 U/L Final   09/24/2012 26 10 - 30 UNIT/L Final     ALT   Date Value Ref Range Status   01/31/2020 7 (L) 10 - 44 U/L Final     Anion Gap   Date Value Ref Range Status   01/31/2020 10 8 - 16 mmol/L Final   09/24/2012 12 5 - 15 meq/L Final     eGFR if    Date Value Ref Range Status   01/31/2020 >60.0 >60 mL/min/1.73 m^2 Final     eGFR if non    Date Value Ref Range Status   01/31/2020 >60.0 >60 mL/min/1.73 m^2 Final     Comment:     Calculation used to obtain the estimated glomerular filtration  rate (eGFR) is the CKD-EPI equation.        @labrcntip(troponini)@    BNP   Date Value Ref Range Status   10/14/2018 94 0 - 99 pg/mL Final     Comment:      Values of less than 100 pg/ml are consistent with non-CHF populations.   }   Lab Results   Component Value Date    CHOL 142 01/31/2020    CHOL 188 01/29/2019    CHOL 136 01/26/2018     Lab Results   Component Value Date    HDL 42 01/31/2020    HDL 85 (H) 01/29/2019    HDL 53 01/26/2018     Lab Results   Component Value Date    LDLCALC 82.4 01/31/2020    LDLCALC 90.6 01/29/2019    LDLCALC 68.2 01/26/2018     Lab Results   Component Value Date    TRIG 88 01/31/2020    TRIG 62 01/29/2019    TRIG 74 01/26/2018     Lab Results   Component Value Date    CHOLHDL 29.6 01/31/2020    CHOLHDL 45.2 01/29/2019    CHOLHDL 39.0 01/26/2018      Last Echo: 09/07/17 FELICIA  Last stress test: 07/30/14, Lexiscan  Cardiovascular angiogram: none  ECG: NSR, rate 64, left axis, pulmonary pattern, QTc 462 msec  Fundoscopic exam: biannually, no retinopathy, being treated for glaucoma.    In 6/2014:  Hospitalized 6/3/2014 for acute right sided weakness and slurred speech  DCS - Ayal Dela Cruz is a 69 y.o. female admitted to the services of hospital medicine via the ER for acute CVA. Presented with difficulty speaking, facial drooping and weakness of the right upper and lower extremities. She missed the time window for tPA. ST/PT/OT as well as cardiology and neurology were consulted. Dr. Jurado of cardiology managed A fib. Patient rapidly improved functioning with PT/OT/ST. Currently her goals with PT are met as she is ambulating over 400 feet independently.  She was not approved for IP rehab and refuses SNF. She will be discharged home with outpatient PT/ST/OT evaluation and treatment.    Neurocardio work up  CT head - Mild involutional changes and findings suggesting mild microvascular ischemic change with no acute intracranial findings    Carotid US - No hemodynamically significant stenosis is noted by velocity criteria involving either internal carotid artery.  A hemodynamically significant stenosis is defined as a stenosis of greater than  50% of the internal carotid artery vessel lumen    ECHO, 6/4/2014, CONCLUSIONS     1 - Concentric hypertrophy. Wall thickness is mildly increased, with the septum and the posterior wall each measuring 1.1 cm across.    2 - Normal left ventricular systolic function (EF 60-65%).     3 - Mild mitral regurgitation.     4 - Left ventricular diastolic dysfunction.     5 - Pulmonary hypertension. estimated PA systolic pressure is 64 mmHg.    6 - Normal right ventricular systolic function .     7 - Suggestion for increase in LV mass from echo on 3/18/2014..     Echo bubble study also negative. Had episode of PAF during monitoring and convert with restart of Flecainide.   Since DC, improving strength in the right hand, right leg feels normal but tire easier. Speech improving. To receive PT/OT/speech today.  Medications reviewed - will DC ASA for now, and know the effects of the Limbitrol and Ativan, uses prn rarely. Some loss of appetite and taste.    Active problems in hospital  Acute left hemispheric CVA, MRI suggest multiple areas, was not on OAC nor antiplatelet Rx  PAF with high CHADS-VAS score, post ablations and DCCs  HTN with LVH  Interstitial lung disease  Pulmonary hypertension  Hyperlipidemia was not on statin    Since visit of 6/20, problem with peripheral swelling, now taking Lasix daily. Last hospitalization / CMP, 6/3 showed K+ 3.4 with normal renal function. Also noted AVILA along with exertional chest heaviness, on-and-off over past several years. Noted it with walking and have to rest. Can last up to an hour. Max grade 10/10, yesterday during the day.  in hospital. Also quite anxious, stopped Limbitrol due to side effects, managed by Dr. Olivo. Quite sedentary and major decrease in appetite, due to depression. No Psychiatrist. Home BP high 175/97. ECG shows NSR, rate 61, right axis, nonspecific T waves abnormalities, prolong QTc 483 msec. Low anterior forces.    Holter, 6/30/2014:  PREDOMINANT  "RHYTHM  1. Sinus rhythm with heart rates varying between 43 and 67 bpm with an average of 55 bpm.     VENTRICULAR ARRHYTHMIAS  1. There were frequent polymorphic PVCs totalling 1388 and averaging 40 per hour.  There was 1 couplet.    2. There were no episodes of ventricular tachycardia.    SUPRA VENTRICULAR ARRHYTHMIAS  1. There were very rare PACs totalling 47 and averaging 1 per hour.     2. There were no episodes of sustained supraventricular tachycardia.    SINUS NODE FUNCTION  1. There was no evidence of high grade SA khai block.     AV CONDUCTION  1. There was no evidence of high grade AV block.     DIARY  1. The diary was returned, but not completed    MISCELLANEOUS  1. This was a tape of adequate length (34 hrs).     Since visit of 7/24, better, reviewed by Dr. Olivo and started antidepressant Lexapro. BMP still showed mild hypokalemia of 3.3, not using Lasix at this time. Still feeling fatigue, anxiety, and request refill for Nexium. Discussed need for GI review on chronic PPI. Lack of appetite.  Lexiscan, 7/30/2014, - Nuclear Quantitative Functional Analysis:   LVEF: 66 % (normal is 55 - 69)  LVED Volume: 50 ml (normal is 60 - 98)  LVES Volume: 17 ml (normal is 20 - 42)    Impression: NORMAL MYOCARDIAL PERFUSION  1. The perfusion scan is free of evidence for myocardial ischemia or injury.   2. There is a mild intensity fixed defect in the anteroseptal wall of the left ventricle, secondary to breast attenuation.   3. Resting wall motion is physiologic.   4. Resting LV function is normal.  (normal is 55 - 69)  5. The ventricular volumes are normal at rest and stress.   6. The extracardiac distribution of radioactivity is normal.     No problem with spironolactone, BP improved, home BP similar to office readings.    Since visit of 8/14, had some distress and home monitor showed HR of 40, felt "bad" and nervous to ED, note reviewed, ECG and final pulse count 57-58. Labs reviewed - normal renal function and " "K+ 3.8. Still taking one tab of K+ daily. Not using Lasix. Explained HR discrepancy may be due to VPCs. Reviewed by Ms. Fermin and Lexapro increased to 20 mg, 2 days ago. Feeling "a lot better". Sleeping better. Still sedentary but ready to be more active. Eating better have lost additional 5 lbs since 8/2014.    Since visit of 9/5, still with speech therapy, noted progressive near syncope with SOB and irregular heart beats. Also noted some ankle swelling over the past 2 weeks. ECG shows marked bradycardia, rate 48, right axis, pulmonary pattern, 1st degree AVB. Wellbutrin 100 mg daily along with Lexapro 20 mg by Ms. Markellroni NP. BMP showed K+3.5. To use Lasix PRN    Since visit of 9/25, still with marked AVILA, only able to walk about 30' before having to stop. Bothered by increase palpitations during tapering of BB. No definite lung problem, evaluated this year. ECG shows sinus dewayne, rate 53, VPC, RBBB with suggestion of septal MI. No evidence for anemia - CBC 9/3/2014 was normal. Just started doing some activities with arm and leg exercise for the last 2 weeks, Doing some OT alternating with PT every other day.  CXR, 6/3/2014 - Lungs are clear of infiltrate and costophrenic sulci are sharp.  The cardiomediastinal silhouette appears stable.    PET scan, 4/2014 - 1.A hypermetabolic left pulmonary mass is noted with an SUV of 3.0 maximally.  A neoplastic, infectious, or granulomatous process is on the differential. Clinical correlation is suggested.  Close follow-up for resolution or biopsy would be suggested    2.  Elevated right-sided heart pressures are suspected with a large right atrium    3.  Right-sided pleural effusion    4.  Hypermetabolic thyroid gland asymmetrically on the right.  This may relate to thyroiditis, Graves' disease, or neoplastic process.  Ultrasound may be helpful.    5.  Small hypermetabolic focus in the expected location of the left ovary, a female pelvis ultrasound may be helpful for further " "evaluation.  This could relate to a uterine fibroid that has necrosed or infarcted    Biopsy of lung nodule on 5/1/2014 - negative for CA    CTA, 4/2014 - No evidence of pulmonary thromboembolism.    2.  Enlarged cardiac silhouette and secondary findings of elevated right heart pressures with diffuse mosaic lung pattern and right basilar pleural fluid suggesting cardiac decompensation/heart failure.    3. Unchanged 1 cm nodular density within the left upper lobe which previously demonstrated hypermetabolism by PET/CT and unchanged mediastinal lymphadenopathy.    4.  Subpleural nodular density within the right upper lobe is unchanged when compared with the previous study and could represent an area of remote fibrotic scarring.    5.  Heterogeneous appearance of the spleen, possibly due to the phase of contrast enhancement.  Evolving splenic infarction is not entirely excluded.    6. Suggestion of colonic wall thickening involving the descending colon.  Please correlate for symptoms of colitis.    Since visit of 10/14, rehospitalized for HF on 10/26 to 10/29/2014.  DCS - "Ayla Dela Cruz is a 69 y.o. female admitted to the services of hospital medicine via the ER for acute diastolic heart failure. C/o severe SOB and increase in leg swelling over the past two days. Under the care of Dr. Jurado. Recently was taken off metoprolol. History of paroxsymal atrial fib. Hospitalized here in June in this year for acute CVA. It was at that time, pt started Xarelto. Deficits has resolved. No history of heart failure.     Hospital Course: The patient was admitted with acute CHF biventricular and mild elevation of her troponin's at 0.029 - 0.035 and therefore an anginal equivalent was suspected. The patient also had significant hypertension upon admission and although she was not in atrial fibrillation, her EKG showed a significant first degree AV block and RBBB. Discussion was made as to whether or not to decrease the patient " "flecainide; however due to the risk of her going back into atrial fibrillation this was not done. Upon discharge the patient's lisinopril was increased to 10 mg and aldactone was added."    RHC done HEMODYNAMIC RESULTS:    LVEDP (Pre): 24 mmHg  BP: 166/104    Air rest:  PA: 90/34 (54)  PW:  (24)  RVEDP: 16  RA:  (16)    C. SUMMARY:    1. Diastolic dysfunction.  2. - Kee CO 2.64 L/min  3. - Mean systemic pressure 108.6 mmHG, stage II HTN  4. - SVR 41.16 Wood Units  5. - PVR 11.36 Wood Units  6. - Pulmonary HTN due to left sided heart disease and stage II HTN    D. RECOMMENDATIONS:    1. Routine post-cath care.  2. Cardiac rehab referral.  3. Follow up with Dr. Barrett Jurado.  4. - Aggressive BP lowering and diuresis    Repeat ECHO 11/5/2014 CONCLUSIONS     1 - Concentric remodeling. Septal wall thickness is increased, and posterior wall thickness is mildly increased, with the septum measuring 1.3 cm and the posterior wall measuring 1.1 cm across.    2 - Mildly to moderately depressed left ventricular systolic function (EF 40-45%).     3 - Left ventricular diastolic dysfunction. E/e'(lat) is 16    4 - Right ventricular enlargement with mildly depressed systolic function.     5 - Pulmonary hypertension. estimated PA systolic pressure is 56 mmHg.     6 - Intermediate central venous pressure.     7 - No evidence of intracardiac shunt.     8 - No significant change from Echo of 6/4/2014.    Active problems:  Progressive severe SOB, functional class IV  Diastolic dysfunction, elevated BNP and Troponin  Hypokalemia due to diuretics  Secondary Pulmonary HTN with peripheral edema  HTN  History of CVA 6/3/2014  PAF controlled with Flecanide  Marked bradycardia with BB  On OAC  Hematuria    At home receiving PT, OT and speech Rx twice a week, with  nurse once a week, no problem with medications. Feeling better, sleeping well. Home /73.    Since visit of 11/14, feeling "much better", concern of occasional HTN, home BP " "/66-99, HR . Lot more active, no problem. Not using walker, no CP, SOB, nor dizziness. Recent BMP - normal renal function and K+ 4.1.    Since visit of 12/19/2014, worry about HTN home readings similar to office up to . Morning reading is higher. No HA nor visual abnormalities. Xanax has helped but afraid to use too much. ECG shows sinus arrhythmia, rate of 65, late transition and LAFB. Also bothered with dry cough with the Lisinopril. And started to have return of arm pains that was attributed to atorvastatin, on Crestor. Discussed options.     Since visit of 1/16/2015, noted frequent nocturia, 8-10 times, taking losartan-HCT at night time. Have stopped using Lasix and spironolactone for past 2 months. More active without much problem. Labs normal renal function, K+ 4.2, BNP now 37, A1C 5.6%.    Since visit of 2/18/2015, improving, no further dizziness, some SOB when "stressed", usually helped with alprazolam, use only prn, about 3-6 times weekly. Compliant with medications. Been off Crestor for 6 weeks with concern of muscle problem. Home BP is fine, similar to office.     Since visit of 4/15, appetite returned, feeling a lot better. Active, walking twice a week for about half an hour. Also do calisthenic about twice a week, no problem. Seeing Dr. Zavaleta for generalized pruritis for past 3 months. Cardiac wise less AVILA. Sleeping well. Labs in 5/2015, normal CBC without eosinophilia, thyroid normal, ferritin level low normal at 21. Off Crestor for couple months due to fear of muscle pain but did not find much difference being off medication. Last Lipid in 11/2014 was good, on daily dose of Crestor. Home /79.    Since visit of 7/2/2015, feeling "great", very active without problem, no more AVILA nor dizziness with standing. Compliant with medications, don't miss. Using Tylenol 500 mg BID for arthritis. Notice easy bruising on Xarelto. Home /10.  Holter - PREDOMINANT RHYTHM  1. Sinus " arrhythmia with heart rates varying between 46 and 110 bpm with an average of 73 bpm.     VENTRICULAR ARRHYTHMIAS  1. There were very rare PVCs recorded totalling 8 and averaging less than 1 per hour.     2. There were no episodes of ventricular tachycardia.    SUPRA VENTRICULAR ARRHYTHMIAS  1. There were very frequent PACs totalling 3054 and averaging 132 per hour.  There were 29 bigeminal cycles.  There were 103 couplets.    2. 3% of complexes.    3. There were no episodes of sustained supraventricular tachycardia.    SINUS NODE FUNCTION  1. There was no evidence of high grade SA khai block.     AV CONDUCTION  1. There was no evidence of high grade AV block.     2. The longest RR interval was 1565 msec.     DIARY  1. The diary was properly completed.     2. There was 1 episode of skipping beats reported. The corresponding rhythm strips revealed the following:             During event 1 the rhythm was sinus rhythm at 75 bpm.     3. There was 1 episode of skipped beat reported. The corresponding rhythm strips revealed the following:             During event 1 the rhythm was sinus arrhythmia at 63 bpm.     MISCELLANEOUS  1. This was a tape of adequate length (23 hrs).     Since visit of 10/15, place on new antidepressant, Venlafaxine 75 mg daily, tried 2 and developed rapid HR to 125 bpm, feels more anxious, now switched to Citalopram. Also noted the daily dose of Lorazepam does not seem adequate. Orthostatic VS is positive with lying 142/73, , standing 120/62, . Been taking spironolactone 25 mg for last 3 days. Does feel thirsty. Labs reviewed A1C 5.8%, CBC, BMP were WNL. Lipid shows LDL 99, non-. Goal preferred is <70 and < 100. No problem with 10 mg of Crestor. Had vacation at [place] [employer] [place], walked all over without problem.    Since visit of 1/18/2016, no new problem. 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 Labs with , non-, hsCRP at 2.56.     In  "10/2016, had acute GB attack with rupture in 8/2016, then tried on Wellbutrin took only 1-2 tabs and BP up to 201/100 with head tightness. Subsequently back to normal, the last 2.5 days took Losartan bid. Discussed Rx options for HTN. Advised to be more active, regular exercise. Lab reviewed LDL in 8/2016 93.     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 Still with daily palpitations, last up to half hours. Have electronic watch, Davidtara Barroso, since 9/2016, suppose harmonize patient with the universe. Feeling better if on during the day. Lot of walking at school, no problem.   Holter in 10/2016 - PREDOMINANT RHYTHM  1. Sinus rhythm with heart rates varying between 52 and 144 bpm with an average of 75 bpm.     2. Paroxysmal atrial fibrillation    VENTRICULAR ARRHYTHMIAS  1. There were occasional mostly monomorphic PVCs totalling 596 and averaging 13 per hour.     2. There were no episodes of ventricular tachycardia.    SUPRA VENTRICULAR ARRHYTHMIAS  1. There were frequent PACs totalling 2982 and averaging 69 per hour.  There were 69 bigeminal cycles.  There were 55 couplets.    2. There were no episodes of sustained supraventricular tachycardia.    3. Paroxysmal atrial fibrillation was noted in the the study.     SINUS NODE FUNCTION  1. There was no evidence of high grade SA khai block.     AV CONDUCTION  1. There was no evidence of high grade AV khai filtering.     2. The longest RR interval was 1725 msec.     DIARY  1. The diary was returned, but not completed    MISCELLANEOUS  1. This was a tape of adequate length (43 hrs).     ECHO CONCLUSIONS     1 - Hyperdynamic left ventricular systolic function (EF 65-70%).     2 - Normal left ventricular diastolic function.     3 - Normal right ventricular systolic function .     4 - Pulmonary hypertension. The estimated PA systolic pressure is 64 mmHg.     5 - Less evidence for LVH from Echo in 11/2014.     In 5/2017, bothered by recurrent PAF with AVILA after 10 " "feet walking. Felt better before on Flecainide 100 mg bid, but Holter continued to show PAF. Attempt up titration, problem with itching, switched to propafenone 150 mg tid, continued with itching and reviewed by allergist, treated with 10 days of cortisone with benefit. No hive and no itching, just don't feel good due to the irregular rhythm. History reviewed, had 2 prior AF ablation, last in [place], [place] in around 2000, recall being told not to do again. Recall seeing an [facility] EP doc but didn't like him. Discussed various options. Will stop antiarrhythmic for now, will be going on a 16 days trip to [place]. Reviewed prior medications, have taken Tenormin, metoprolol tartrate, and short-acting diltiazem in the past without problem. Refer to Dr. Calderon for review. ECG in AF, rate 99, PRWP, LAFB    In 11/2017, post AF ablation, felt good for a few weeks, noted some SOB and had review with Dr. Calderon on 11/1 - 72 year old female with a longstanding history of atrial arrhythmias. Her [id number] Score is 5 (age, female, TIA, HTN) so she should remain on anticoagulation indefinitely. She has failed Flecainide. She is now 6 weeks post-PVI and MA flutter line, and maintaining sinus rhythm on low-dose Amiodarone. Given her intermittent AVILA which started post-ablation, I have stopped Amiodarone which I think is the likely culprit. I instructed her to continue taking Lasix PRN, as well as metoprolol and Xarelto." ECG on 11/1 was in NSR, rate 61, PRWP.  Recommended to stop amiodarone but then started to feel the palpitation daily, felt nervous and at the same time being taper down on the nightly Ativan. Did have some breakthrough anxiety attacks. Also had strained the upper back and right arm / shoulder, requesting some Tramadol, tried Jan's Tramadol with good relief. Understand this is an opoid. Used Excedrin with caffeine and bothered by more palpitations.    In 3/2018, here for recurrent palpitations, no inciting " "factors over the past 6 weeks. Had review with Dr. Calderon in 2/2018 - "72 year old female with a longstanding history of atrial arrhythmias and prior ablations at outside institutions. Her [id number] Score is 5 (age, female, TIA, HTN) so she should remain on anticoagulation indefinitely. She is now 5 months post-PVI and MA flutter line, and maintaining sinus rhythm off Amiodarone. The plan is to continue Xarelto, and to see me again in 6 months." Palpitations appears associated with AVILA, had difficulties walking in house or up a ramp. Started 100 mg of amiodarone last week, daily, feels some benefit. ECG today in Sinus rhythm vs wandering atrial pacemaker with frequent PJC, rate 59. Also taking Losartan in the morning appears more effective.  Holter 11/2017 - PREDOMINANT RHYTHM  1. Sinus arrhythmia with heart rates varying between 39 and 102 bpm with an average of 58 bpm.     VENTRICULAR ARRHYTHMIAS  1. There were occasional PVCs totalling 728 and averaging 15 per hour.  There were 5 bigeminal cycles.     2. There were no episodes of ventricular tachycardia.    SUPRA VENTRICULAR ARRHYTHMIAS  1. There was no supraventricular ectopic activity.    SINUS NODE FUNCTION  1. There was no evidence of high grade SA khai block.     AV CONDUCTION  1. There was no evidence of high grade AV block.     2. The longest RR interval was 1820 msec.     DIARY  1. The diary was not returned    MISCELLANEOUS  1. There were occasional hookup related artifacts. Overall, the study was of good quality.     2. This was a tape of adequate length (48 hrs).     in 5/2018, no new problem, still concern of AVILA, usually with walking, variable as little 10 feet or fine with some long walk, can play flute for an hour but find difficulties in holding a note. Off daily amiodarone, uses it prn for palpitation, find helpful. Labs reviewed from 1/2018, TSH, Vitamin D, LDL 68.2, A1C 5.9%, CMP - normal renal function, K+ 3.6. To go to [place] for a month " "in 8/2018.    In 9/2018, return from a month family reunion trip to [place]. Problem with hypotension with Tramadol and Losartan. Had review with Dr. Calderon on 9/5 "Ayla Dela Cruz is a 73 year old female with a longstanding history of atrial arrhythmias and prior ablations at outside institutions. Her QRH2IY0-HXVs Score is 5 (age, female, TIA, HTN) so she should remain on anticoagulation indefinitely. She is now 11 months post-PVI and MA flutter line, and maintaining sinus rhythm off Amiodarone. However, she is feeling poorly, with frequent PACs and borderline hypotension. The plan is to stop Losartan, echo in next several weeks, Holter monitor in 6 weeks, and follow-up with me in 8 weeks."  Off both medication on 8/31, no problem now. No heart worry. Denies any CP nor SOB. Still teaching. ECG on 9/5 shows NSR with sinus arrhythmia.    In 3/2019, return for follow up, had syncopal spell with hypotension at [facility] required reconstruction of the right ankle, now in PT, doing well. No heart complication, no AF. LDL 90.6 in 1/2019. 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 No heart worries, no CP nor SOB, much better, breathing improved with daily Breo use. Discussed option with NOAC.    In 9/2019, 6 months review, concern of bruising with Xarelto, recall problem with dark stool with Eliquis. Discussed option.     In 1/2020 return due to allergic reaction to Pradaxa, had to stop Eliquis for similar problems - hives and rash, difficult to sleep. Also problem with embolism when off NOAC for 7 days. Discuss alternatives.    HPI comments: in 3/2020, same problem with Savaysa, noted about an hour of PAF this morning, felt nervous. Troubled by recent viral infection and also pain injection with steroid, which helped the rash temporary. Would like to proceed with mechanical alternative. History include past use of Coumadin for about 2 years, had some difficult with GIB and also required up to 3 times a " week testing.    ROS    Constitutional: negative for chills, fatigue, fevers, sweats, no further slurred speech, and no dysarthria, appetite improved, intolerance to heat, bruises easily on NOAC and steroid, weight stable since 3/2018. Hives in the back with rash.  Eyes: negative for icterus, redness and visual disturbance, have visual halo, no more bleed in the right Iris  Respiratory: negative for asthma, cough have resolved off ACE-I, have dyspnea on exertion, occasional SOB with HR 85 to 100 bpm, have lifelong snoring, Fairbury score 7 improved down to 3, no hemoptysis, pleurisy/chest pain and wheezing  Cardiovascular: negative for chest pain, chest pressure/discomfort, no further orthostatic dizziness, and no palpitations, no paroxysmal nocturnal dyspnea and syncope  Gastrointestinal: negative for abdominal pain, nausea and vomiting, have GERD  Musculoskeletal:positive for arthralgias, and less right sided muscle weakness with improvement in leg strength, improved bilateral ankle pains - OA and no myalgias  Neurological: negative for coordination problems, dizziness, gait problems, headaches, paresthesia, have some tremors but able to draw and no vertigo  Psych: positive for depression, nervousness, anxiety, less stressed due to 's have improved  Skin - right back pruritic rash.    Objective:    Physical Exam    General appearance: alert, appears stated age, cooperative and no distress, decrease skin turgor.  Head: Normocephalic, without obvious abnormality, atraumatic  Eyes:  conjunctivae/corneas clear. PERRL, EOM's intact.    Neck: no adenopathy, no carotid bruit, no JVD, supple, symmetrical, trachea midline and thyroid not enlarged, symmetric, no tenderness/mass/nodules  Lungs:  clear to auscultation bilaterally  Chest wall: no tenderness  Heart: regular rate and rhythm, normal S1, S2 normal, occasional grade 2/6 systolic murmur, click, rub or gallop    Abdomen: soft, non-tender; bowel sounds normal;  "no masses,  no organomegaly, waist 31" hip 42"  Extremities: extremities no further weakness of the right upper extremity, atraumatic, no cyanosis and have no edema, minor varicose veins  Pulses: 2+ and symmetric    Assessment:       1. Chronic pruritus, onset 3/2015    2. PAF (paroxysmal atrial fibrillation)    3. Paroxysmal atrial fibrillation, ablations X 3 1995, 1997, 9/2017, CHADS-VAS score 5, HAS-BLED score 5     4. Essential hypertension    5. Iron deficiency anemia due to chronic blood loss    6. Hypoalbuminemia    7. Medication intolerance         Plan:       Ayla was seen today for follow-up.    Diagnoses and associated orders for this visit:    Chronic pruritus, onset 3/2015  -     Ambulatory referral/consult to Cardiac Electrophysiology; Future; Expected date: 03/18/2020    PAF (paroxysmal atrial fibrillation)  -     EKG 12-lead  -     Ambulatory referral/consult to Cardiac Electrophysiology; Future; Expected date: 03/18/2020    Paroxysmal atrial fibrillation, ablations X 3 1995, 1997, 9/2017, CHADS-VAS score 5, HAS-BLED score 5     Essential hypertension    Iron deficiency anemia due to chronic blood loss    Hypoalbuminemia    Medication intolerance  -     Ambulatory referral/consult to Cardiac Electrophysiology; Future; Expected date: 03/18/2020    - All medical issues reviewed, will need to consider mechanical ablation of STACEY  - CV status stable, off antiarrhythmic, all medications reviewed, patient acknowledge good understanding.  - Recommend using Tylenol as first line pain medications.  - Instruction for Mediterranean diet and heart healthy exercise given.  - Need good exercise program, 4 key elements: 1. Aerobic (walking, swimming, dancing, jogging, biking, etc, 2. Muscle strengthening / resistance exercise, need to do 2-3 times weekly, 3. Stretching daily, good stretch takes a whole  total minute. 4. Balance exercise daily.  - Highly recommend 30 minutes of exercise daily, can have Sunday off, " with 2-3 sessions of muscle strengthening weekly. A  would be very helpful.  - Recommend at least biannual cardiovascular evaluation in view of her significant risk factors, patient 's preference    Patient Active Problem List   Diagnosis    Paroxysmal atrial fibrillation, ablations X 3 1995, 1997, 9/2017, CHADS-VAS score 5, HAS-BLED score 5     Hypertension, 1985    Hyperlipidemia, baseline     Glaucoma (increased eye pressure)    Fibroid uterus    Thickened endometrium    Abnormal CT scan, chest    Interstitial lung disease    H/O: CVA (cerebrovascular accident), June 2014    LVH (left ventricular hypertrophy) due to hypertensive disease    Depression    AVILA (dyspnea on exertion)    OA (osteoarthritis)    Chronic diastolic heart failure, 2014    Microalbuminuria    Hypoalbuminemia    TIA (transient ischemic attack), 11/3/2014    Chronic pruritus, onset 3/2015    S/P ablation of atrial flutter    JOSE (generalized anxiety disorder)    Other spondylosis, lumbar region    Heart palpitations    Cervical spondylosis    Medication intolerance    Mild persistent asthma without complication    Anticoagulant long-term use    GERD (gastroesophageal reflux disease)    Renal infarct, due to embolism off Eliquis for 7 days    Mild intermittent asthma without complication    Transaminitis    Body mass index (BMI) 19.9 or less, adult    Localized hives    Elevated AST (SGOT)    Iron deficiency anemia due to chronic blood loss    Other specified spondylopathies, lumbar region    Recurrent major depressive disorder, in partial remission    Gastroenteritis     Greater than 50% was spent in counseling and coordination of care. The above assessment and plan have been discussed at length. Labs and procedure over the last 6 months reviewed. Problem List updated. Asked to bring in all active medications / pills bottles with next visit.

## 2020-03-11 NOTE — PROGRESS NOTES
" Patient ID:  Ayla Dela Cruz is a 74 y.o. female who presents for {Misc; evaluation/follow-up:65847::"follow-up"} of 6 month f/u      Health literacy: {DESC; LOW/MEDIUM/HIGH:46473} High  Activities: housework  Nicotine: never  Alcohol: rare, wine  Illicit drugs: none  Cardiac symptoms: heart flutter  Home BP: 123/70  Medication compliance: yes  Diet: regular, diabetic, Mediterranean regular  Caffeine: none  Labs:   Last Echo: 2017  Last stress test: 2014  Cardiovascular angiogram:   EC2020  Fundoscopic exam:     EPWORTH SLEEPINESS SCALE 2015   Sitting and reading 0   Watching TV 0   Sitting, inactive in a public place (e.g. a theatre or a meeting) 0   As a passenger in a car for an hour without a break 0   Lying down to rest in the afternoon when circumstances permit 0   Sitting and talking to someone 0   Sitting quietly after a lunch without alcohol 0   In a car, while stopped for a few minutes in traffic 0   Total score 0         HPI    ROS     Objective:    Physical Exam      Assessment:       1. PAF (paroxysmal atrial fibrillation)         Plan:         PAF (paroxysmal atrial fibrillation)  -     EKG 12-lead                "

## 2020-03-16 RX ORDER — GABAPENTIN 300 MG/1
CAPSULE ORAL
Qty: 270 CAPSULE | Refills: 0 | Status: SHIPPED | OUTPATIENT
Start: 2020-03-16 | End: 2020-06-10 | Stop reason: SINTOL

## 2020-03-28 ENCOUNTER — PATIENT MESSAGE (OUTPATIENT)
Dept: PAIN MEDICINE | Facility: CLINIC | Age: 75
End: 2020-03-28

## 2020-03-30 ENCOUNTER — PATIENT MESSAGE (OUTPATIENT)
Dept: PAIN MEDICINE | Facility: CLINIC | Age: 75
End: 2020-03-30

## 2020-03-31 ENCOUNTER — EXTERNAL CHRONIC CARE MANAGEMENT (OUTPATIENT)
Dept: PRIMARY CARE CLINIC | Facility: CLINIC | Age: 75
End: 2020-03-31
Payer: MEDICARE

## 2020-03-31 PROCEDURE — 99490 CHRNC CARE MGMT STAFF 1ST 20: CPT | Mod: S$GLB,,, | Performed by: FAMILY MEDICINE

## 2020-03-31 PROCEDURE — 99490 PR CHRONIC CARE MGMT, 1ST 20 MIN: ICD-10-PCS | Mod: S$GLB,,, | Performed by: FAMILY MEDICINE

## 2020-04-02 ENCOUNTER — PATIENT MESSAGE (OUTPATIENT)
Dept: FAMILY MEDICINE | Facility: CLINIC | Age: 75
End: 2020-04-02

## 2020-04-06 ENCOUNTER — PATIENT MESSAGE (OUTPATIENT)
Dept: FAMILY MEDICINE | Facility: CLINIC | Age: 75
End: 2020-04-06

## 2020-04-09 DIAGNOSIS — J45.30 MILD PERSISTENT ASTHMA WITHOUT COMPLICATION: Primary | ICD-10-CM

## 2020-04-10 RX ORDER — PREDNISONE 5 MG/1
5 TABLET ORAL DAILY
Qty: 10 TABLET | Refills: 0 | Status: SHIPPED | OUTPATIENT
Start: 2020-04-10 | End: 2020-04-20

## 2020-04-14 ENCOUNTER — PATIENT MESSAGE (OUTPATIENT)
Dept: FAMILY MEDICINE | Facility: CLINIC | Age: 75
End: 2020-04-14

## 2020-04-21 ENCOUNTER — PATIENT OUTREACH (OUTPATIENT)
Dept: ADMINISTRATIVE | Facility: OTHER | Age: 75
End: 2020-04-21

## 2020-04-22 ENCOUNTER — OFFICE VISIT (OUTPATIENT)
Dept: PAIN MEDICINE | Facility: CLINIC | Age: 75
End: 2020-04-22
Payer: MEDICARE

## 2020-04-22 DIAGNOSIS — M47.892 OTHER SPONDYLOSIS, CERVICAL REGION: ICD-10-CM

## 2020-04-22 DIAGNOSIS — M43.06 SPONDYLOLYSIS, LUMBAR REGION: Primary | ICD-10-CM

## 2020-04-22 DIAGNOSIS — M79.10 MYALGIA: ICD-10-CM

## 2020-04-22 DIAGNOSIS — M50.30 DDD (DEGENERATIVE DISC DISEASE), CERVICAL: ICD-10-CM

## 2020-04-22 PROCEDURE — 99213 OFFICE O/P EST LOW 20 MIN: CPT | Mod: 95,,, | Performed by: PHYSICIAN ASSISTANT

## 2020-04-22 PROCEDURE — 99213 PR OFFICE/OUTPT VISIT, EST, LEVL III, 20-29 MIN: ICD-10-PCS | Mod: 95,,, | Performed by: PHYSICIAN ASSISTANT

## 2020-04-22 PROCEDURE — 1159F MED LIST DOCD IN RCRD: CPT | Mod: ,,, | Performed by: PHYSICIAN ASSISTANT

## 2020-04-22 PROCEDURE — 1159F PR MEDICATION LIST DOCUMENTED IN MEDICAL RECORD: ICD-10-PCS | Mod: ,,, | Performed by: PHYSICIAN ASSISTANT

## 2020-04-22 PROCEDURE — 1101F PR PT FALLS ASSESS DOC 0-1 FALLS W/OUT INJ PAST YR: ICD-10-PCS | Mod: ,,, | Performed by: PHYSICIAN ASSISTANT

## 2020-04-22 PROCEDURE — 1101F PT FALLS ASSESS-DOCD LE1/YR: CPT | Mod: ,,, | Performed by: PHYSICIAN ASSISTANT

## 2020-04-22 NOTE — PROGRESS NOTES
Referring Physician: No ref. provider found    PCP: Jan Olivo MD    CC:  Lower back pain  Visit type: Virtual visit with synchronous audio and video  Total time spent with patient: 25 minutes  Each patient to whom he or she provides medical services by telemedicine is:  (1) informed of the relationship between the physician and patient and the respective role of any other health care provider with respect to management of the patient; and (2) notified that he or she may decline to receive medical services by telemedicine and may withdraw from such care at any time.  Patient is at home safe in [place]  Interval History:  Mrs. Dela Cruz is a 75 y.o. female  with  Chronic low back pain who presents today for f/u s/p lumbar MB RFA at L3, 4, 5 bilaterally. Reports improvement in low back pain. Continues to c/o some low back pain that radiates to her bilateral hips.  She would liek to have Cervical RFA repeated. Reports pain is mainly into her shoulder girdle. She had a right shoulder injection in July that provided minimal benefit.  She is scheduled for repeat cervical MB RFA in the near future. .  She denies any worsening weakness.  No bowel bladder changes. She does not have a home exercises plan. Pain today is rated 6/10.    Prior HPI:   Ayla Dela Cruz is a 75 y.o. female who presents as a new patient from Dr. Grubbs for 2.5 month history of R shoulder/mid back pain. The pain first began when she lifted some books at a bookstore in early November. She began feeling the pain in her R lateral neck/suprascapular region, with radiation down the back/side of her arm to the top of the elbow. She was treated with some topical creams, chiropractor adjustments, and then was placed on gabapentin by Dr. Grubbs, which has helped her pain a considerable amount. She states that the pain is intermittent, aching, sharp, shooting & radiates to her elbow. Worsened by sitting, or lying in bed. 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     Interventional history:  Cervical epidural steroid injection on 01/2018 and 04/2018 with moderate benefit of her neck and right shoulder pain.  - Cervical right C 4-6 on 07/2018 with 60% relief of her right-sided neck pain.  - lumbar MB RFA on 09/2018 with 60% relief of her lower back pain    ROS:  CONSTITUTIONAL: No fevers, chills, night sweats, wt. loss, appetite changes  SKIN: no rashes or itching  ENT: No headaches, head trauma, vision changes, or eye pain  LYMPH NODES: None noticed   CV: No chest pain, palpitations.   RESP: No shortness of breath, dyspnea on exertion, cough, wheezing, or hemoptysis  GI: No nausea, emesis, diarrhea, constipation, melena, hematochezia, pain.    : No dysuria, hematuria, urgency, or frequency   HEME: No easy bruising, bleeding problems  PSYCHIATRIC: No anxiety, psychosis, hallucinations. +ve depression  NEURO: No seizures, memory loss, dizziness, +ve difficulty sleeping  MSK: +HPI      Past Medical History:   Diagnosis Date    Anticoagulant long-term use     Anxiety     Arthritis     Atrial fibrillation     Cancer     skin    CHF (congestive heart failure)     Depression     DVT (deep venous thrombosis)     GERD (gastroesophageal reflux disease)     Glaucoma (increased eye pressure)     Hyperlipidemia     diet controlled    Hypertension     Interstitial lung disease     Localized hives 1/10/2020    Mild persistent asthma without complication 11/12/2018    Pneumonia 1/31/2014    Stroke 6-3-14    Stroke     TIA (transient ischemic attack)     TIA (transient ischemic attack)      Past Surgical History:   Procedure Laterality Date    2 heart ablations      3 in total    bilateral cataracts      CHOLECYSTECTOMY      COLONOSCOPY N/A 1/19/2017    Procedure: COLONOSCOPY and EGD;  Surgeon: Jonathan Schultz MD;  Location: [facility];  Service: Endoscopy;  Laterality: N/A;    COLONOSCOPY N/A 10/10/2019    Procedure: COLONOSCOPY;  Surgeon: Alex  VALERIE Christian MD;  Location: [facility];  Service: Endoscopy;  Laterality: N/A;    ESOPHAGOGASTRODUODENOSCOPY N/A 10/14/2019    Procedure: EGD (ESOPHAGOGASTRODUODENOSCOPY);  Surgeon: Alex Christian MD;  Location: [facility];  Service: Endoscopy;  Laterality: N/A;    INJECTION OF ANESTHETIC AGENT AROUND MEDIAL BRANCH NERVES INNERVATING CERVICAL FACET JOINT Right 6/7/2018    Procedure: BLOCK, NERVE, FACET JOINT, MEDIAL BRANCH, CERVICAL;  Surgeon: Galo Clancy MD;  Location: [facility] beds at [facility];  Service: Pain Management;  Laterality: Right;  C4, 5, 6    OPEN REDUCTION AND INTERNAL FIXATION (ORIF) OF INJURY OF ANKLE Right 11/1/2018    Procedure: ORIF, ANKLE;  Surgeon: Wilfredo Aguero MD;  Location: [facility];  Service: Orthopedics;  Laterality: Right;    pyloristenosis      RADIOFREQUENCY ABLATION OF LUMBAR MEDIAL BRANCH NERVE AT SINGLE LEVEL Bilateral 9/21/2018    Procedure: RADIOFREQUENCY ABLATION, NERVE, SPINAL, LUMBAR, MEDIAL BRANCH, 1 LEVEL;  Surgeon: Galo Clancy MD;  Location: [facility] 234 beds at the [facility];  Service: Pain Management;  Laterality: Bilateral;  L3, 4, 5    RADIOFREQUENCY ABLATION OF LUMBAR MEDIAL BRANCH NERVE AT SINGLE LEVEL Bilateral 2/19/2019    Procedure: Radiofrequency Ablation, Nerve, Spinal, Lumbar, Medial Branch, 1 Level;  Surgeon: Galo Clancy MD;  Location: [facility] 234 beds at the [facility];  Service: Pain Management;  Laterality: Bilateral;  L3, 4, 5     RADIOFREQUENCY ABLATION OF LUMBAR MEDIAL BRANCH NERVE AT SINGLE LEVEL Bilateral 3/10/2020    Procedure: Radiofrequency Ablation, Nerve, Spinal, Lumbar, Medial Branch, 1 Level;  Surgeon: Galo Clancy MD;  Location: [facility] 234 beds at the [facility];  Service: Pain Management;  Laterality: Bilateral;  L3,4,5 - Burned at 80 degrees C. for 60 seconds x 2 each site      RADIOFREQUENCY THERMAL COAGULATION OF MEDIAL BRANCH OF POSTERIOR RAMUS OF CERVICAL SPINAL NERVE Right 7/3/2018    Procedure: RADIOFREQUENCY THERMAL COAGULATION, NERVE, SPINAL, CERVICAL, MEDIAL BRANCH OF POSTERIOR RAMUS;  Surgeon: Galo Clancy MD;  Location:  [facility];  Service: Pain Management;  Laterality: Right;  C4,5,6 - Burned at 80 degrees C. for 75 seconds each site    RADIOFREQUENCY THERMAL COAGULATION OF MEDIAL BRANCH OF POSTERIOR RAMUS OF CERVICAL SPINAL NERVE Right 7/23/2019    Procedure: RADIOFREQUENCY THERMAL COAGULATION, NERVE, SPINAL, CERVICAL, POSTERIOR RAMUS, MEDIAL BRANCH;  Surgeon: Galo Clancy MD;  Location: [facility];  Service: Pain Management;  Laterality: Right;  C4,5,6    RADIOFREQUENCY THERMOCOAGULATION Bilateral 9/10/2019    Procedure: RADIOFREQUENCY THERMAL COAGULATION LUMBAR;  Surgeon: Galo Clancy MD;  Location: [facility];  Service: Pain Management;  Laterality: Bilateral;  L3,4,5 - Burned at 80 degrees C. for 60 seconds x 2 each site    skin cancer removal       TONSILLECTOMY       Family History   Problem Relation Age of Onset    Heart disease Father     Ulcers Father     Arthritis Mother     Asthma Mother     Rheum arthritis Mother     Pneumonia Mother     Depression Son     Alzheimer's disease Maternal Uncle     Rheum arthritis Maternal Grandmother     Emphysema Maternal Grandfather     Cancer Maternal Grandfather         kidney    Kidney disease Maternal Grandfather     Cancer Paternal Grandmother         lung= smoker    Pneumonia Paternal Grandfather     Breast cancer Neg Hx     Ovarian cancer Neg Hx      Social History     Socioeconomic History    Marital status:      Spouse name: Not on file    Number of children: Not on file    Years of education: Not on file    Highest education level: Not on file   Occupational History    Not on file   Social Needs    Financial resource strain: Not hard at all    Food insecurity:     Worry: Never true     Inability: Never true    Transportation needs:     Medical: No     Non-medical: No   Tobacco Use    Smoking status: Never Smoker    Smokeless tobacco: Never Used   Substance and Sexual Activity    Alcohol use: No     Frequency: Monthly or less     Drinks per session:  1 or 2     Binge frequency: Never    Drug use: No    Sexual activity: Yes     Partners: Male   Lifestyle    Physical activity:     Days per week: 0 days     Minutes per session: 0 min    Stress: To some extent   Relationships    Social connections:     Talks on phone: Three times a week     Gets together: Once a week     Attends Gnosticism service: Not on file     Active member of club or organization: Yes     Attends meetings of clubs or organizations: More than 4 times per year     Relationship status:    Other Topics Concern    Not on file   Social History Narrative    Not on file         Medications/Allergies: See med card  Pain today is rated 6/10  Physical exam:      GENERAL: A and O x3, the patient appears well groomed and is in no acute distress.  Skin: No rashes or obvious lesions  HEENT: normocephalic, atraumatic  LUNGS: non labored breathing  ABDOMEN: non distended  UPPER EXTREMITIES and lower extremities. Normal ROM  CERVICAL SPINE:  Cervical spine: ROM is full in flexion, extension and lateral rotation without increased pain.    LUMBAR SPINE  Lumbar spine: ROM is limited with flexion extension and oblique extension with mild-to-moderate increased pain.      MENTAL STATUS: normal orientation, speech, language, and fund of knowledge for social situation.  Emotional state appropriate.    GAIT: normal rise, base, steps, and arm swing.          Imaging:  MRI C-spine 12/2017  There is a 2 mm retrolisthesis of C4 on C5 and C6 on C7.  There is reversal of the normal cervical lordosis which could relate to neck muscle spasm.The cervical vertebral bodies show normal signal intensity and height with no indication of acute fracture or pathologic marrow replacement process.    There is degenerative disc desiccation at every cervical level with marginal osteophyte formation and disc space narrowing noted at C3-C4, C4-C5, C5-C6, and to a lesser extent, C6-C7.  There is congenital spinal canal narrowing  present.    The cervical cord is normal in signal intensity at all levels with no indication of myelomalacia or cord edema. The incidentally observed soft tissues of the neck show no significant abnormalities.    C2-C3: There is bilateral hypertrophic facet arthropathy.  There is left ligamentum flavum thickening.  No definite acquired central canal or neuroforaminal stenosis.    C3-C4: There is a posterior disc osteophyte complex with a superimposed broad central disc protrusion which flattens the ventral cord.  There is ligamentum flavum thickening, bilateral uncovertebral spurring, and bilateral facet arthropathy, left greater than right.  There is moderate overall central canal stenosis, moderate-severe left neuroforaminal stenosis, and moderate right neuroforaminal stenosis.    C4-C5: There is a bulging posterior disc osteophyte complex, ligamentum flavum thickening, bilateral uncovertebral spurring, and facet arthropathy, right greater than left.  There is severe bilateral neural foraminal stenosis and moderate overall central canal stenosis.  No abnormal cord signal.  There is flattening of the ventral cord.    C5-C6: There is a posterior disc osteophyte complex and bilateral uncovertebral spurring, right greater than left.  No left neuroforaminal stenosis.  There is mild-moderate right neuroforaminal stenosis and mild-moderate overall central canal stenosis.    C6-C7: There is a 2 mm retrolisthesis of C6 on C7 with uncovering of the intervertebral disc and a superimposed posterior disc osteophyte complex.  There is bilateral uncovertebral spurring, left greater than right.  There is severe left and moderate right neuroforaminal stenosis.  There is ligamentum flavum thickening.  There is moderate central canal stenosis with flattening of the ventral cord.    C7-T1: No central canal or neuroforaminal stenosis. No disc protrusion or extrusion.    Assessment:  Mrs. Dela Cruz is a 75 y.o. female with     1.  Spondylolysis, lumbar region    2. Other spondylosis, cervical region    3. Myalgia    4. DDD (degenerative disc disease), cervical      Plan:  1. Recommend physical therapy and home exercise regimen to improve strength . Home exercises provided LCV  2. Continue Gabapentin 300 mg TID    3. Monitor progress and consider repeat bilateral lumbar MB RFA procedure to help with her lower back pain.  4  Recommend repeat cervical MB RFA procedure once elective procedures resume.  5.  Follow-up prn

## 2020-04-30 ENCOUNTER — TELEPHONE (OUTPATIENT)
Dept: FAMILY MEDICINE | Facility: CLINIC | Age: 75
End: 2020-04-30

## 2020-04-30 ENCOUNTER — EXTERNAL CHRONIC CARE MANAGEMENT (OUTPATIENT)
Dept: PRIMARY CARE CLINIC | Facility: CLINIC | Age: 75
End: 2020-04-30
Payer: MEDICARE

## 2020-04-30 PROCEDURE — 99490 CHRNC CARE MGMT STAFF 1ST 20: CPT | Mod: S$GLB,,, | Performed by: FAMILY MEDICINE

## 2020-04-30 PROCEDURE — 99490 PR CHRONIC CARE MGMT, 1ST 20 MIN: ICD-10-PCS | Mod: S$GLB,,, | Performed by: FAMILY MEDICINE

## 2020-04-30 PROCEDURE — G2058 CCM ADD 20MIN: HCPCS | Mod: S$GLB,,, | Performed by: FAMILY MEDICINE

## 2020-04-30 PROCEDURE — G2058 PR CHRON CARE MGMT, EA ADDTL 20 MINS: ICD-10-PCS | Mod: S$GLB,,, | Performed by: FAMILY MEDICINE

## 2020-04-30 NOTE — TELEPHONE ENCOUNTER
FW: Weight gain   Received: Today   Message Contents   NITHYA Lanier Staff             Please see message below    Previous Messages      ----- Message -----   From: Jessika Orellana   Sent: 4/30/2020  12:20 PM CDT   To: Marichuy Florez LPN   Subject: Weight gain                                       CC spoke with pt today states having continued issues with regaining weight that pt lost during COVID 19. PT request call back with recommendation on ways to re gain weight. Pt states is drinking vitamin infused energy drink in am but has not seen significant improvement with supplement.   Thank you Jessika Orellana LPN

## 2020-04-30 NOTE — TELEPHONE ENCOUNTER
Called patient- she is at 119 lbs at present. She was in hospital in January 2020 with flulike symptoms and lost weight. She was at 127 when she saw Lindy Reyna for follow up. Asking  the best way to gain weight back.

## 2020-05-01 NOTE — TELEPHONE ENCOUNTER
Patient called- she drinks Juice Plus- it does not contain caffeine. She drinks the chocolate and vanilla that comes in packets and mixes with milk. Drinks one with breakfast and lunch. Breakfast may be toast with jelly and bowl of cereal. Typical lunch is a sandwich.

## 2020-05-01 NOTE — TELEPHONE ENCOUNTER
"I don't see any suspicious medications she is taking but I would suspect her "energy drink"  Those are typically loaded with caffeine and other stimulants and are designed to cause weight loss  "

## 2020-05-01 NOTE — TELEPHONE ENCOUNTER
As long as she is not continuing to lose weight I think I would stay with that.  She can increase gradually over time.  It is still relatively recent after the infection.

## 2020-05-27 ENCOUNTER — PATIENT OUTREACH (OUTPATIENT)
Dept: ADMINISTRATIVE | Facility: HOSPITAL | Age: 75
End: 2020-05-27

## 2020-05-27 NOTE — PROGRESS NOTES
Chart review completed 05/27/2020.  Care Everywhere updates requested and reviewed.  Immunizations reconciled. Media reviewed.

## 2020-05-28 ENCOUNTER — TELEPHONE (OUTPATIENT)
Dept: UROLOGY | Facility: CLINIC | Age: 75
End: 2020-05-28

## 2020-05-28 NOTE — TELEPHONE ENCOUNTER
Pt stated she has a rash around vagina/buttocks area. She describes it almost like hives. Pt stated she is allergic to her blood thinner, and that is what causes the itching/rash. Pt is currently applying Desitin to the area. Pt requesting prednisone. Pt stated it is the only thing that helps with the rash.

## 2020-05-28 NOTE — TELEPHONE ENCOUNTER
----- Message from Yoana Temple sent at 5/28/2020  2:32 PM CDT -----  Type:  RX Refill Request    Who Called:  self  Refill or New Rx:  New rx   RX Name and Strength:   How is the patient currently taking it? (ex. 1XDay):   Is this a 30 day or 90 day RX:    Preferred Pharmacy with phone number:  [facility] [address] [place]  Local or Mail Order:  local  Ordering Provider:  Dr Uday Oliver Call Back Number:  [phone]  Additional Information:  Patient complain of itchy rash in the vaginal area.

## 2020-05-31 ENCOUNTER — EXTERNAL CHRONIC CARE MANAGEMENT (OUTPATIENT)
Dept: PRIMARY CARE CLINIC | Facility: CLINIC | Age: 75
End: 2020-05-31
Payer: MEDICARE

## 2020-05-31 PROCEDURE — 99490 CHRNC CARE MGMT STAFF 1ST 20: CPT | Mod: S$GLB,,, | Performed by: FAMILY MEDICINE

## 2020-05-31 PROCEDURE — 99490 PR CHRONIC CARE MGMT, 1ST 20 MIN: ICD-10-PCS | Mod: S$GLB,,, | Performed by: FAMILY MEDICINE

## 2020-06-01 RX ORDER — CLOTRIMAZOLE AND BETAMETHASONE DIPROPIONATE 10; .64 MG/G; MG/G
CREAM TOPICAL DAILY
Qty: 45 G | Refills: 2 | Status: SHIPPED | OUTPATIENT
Start: 2020-06-01 | End: 2020-06-10

## 2020-06-05 ENCOUNTER — TELEPHONE (OUTPATIENT)
Dept: UROLOGY | Facility: CLINIC | Age: 75
End: 2020-06-05

## 2020-06-05 NOTE — TELEPHONE ENCOUNTER
"Spoke with Dr. Frye about pt's concerns, Dr. Frye stated that pt needs to call her PCP or OB/GYN for the vaginal itching if the cream did not work. Relayed Dr. Frye's instructions to pt, she sighed and said "okay" then hung up the phone.   "

## 2020-06-05 NOTE — TELEPHONE ENCOUNTER
----- Message from Niki Matos sent at 6/5/2020 10:56 AM CDT -----  Contact: pt [phone]  Patient called to let you know the Cream you called in did not work.  She is asking if you will call in some Prednisone in place of the Cream       clotrimazole-betamethasone 1-0.05% (LOTRISONE) cream  The itching has not stopped.   Patient is asking for a call back at [phone]  Pharmacy [facility]

## 2020-06-06 ENCOUNTER — CLINICAL SUPPORT (OUTPATIENT)
Dept: URGENT CARE | Facility: CLINIC | Age: 75
End: 2020-06-06
Payer: MEDICARE

## 2020-06-06 VITALS
WEIGHT: 119 LBS | DIASTOLIC BLOOD PRESSURE: 79 MMHG | TEMPERATURE: 99 F | HEART RATE: 91 BPM | OXYGEN SATURATION: 97 % | SYSTOLIC BLOOD PRESSURE: 149 MMHG | RESPIRATION RATE: 12 BRPM | BODY MASS INDEX: 18.64 KG/M2

## 2020-06-06 DIAGNOSIS — L29.9 CHRONIC PRURITUS: Primary | ICD-10-CM

## 2020-06-06 PROCEDURE — 99213 PR OFFICE/OUTPT VISIT, EST, LEVL III, 20-29 MIN: ICD-10-PCS | Mod: S$GLB,,, | Performed by: NURSE PRACTITIONER

## 2020-06-06 PROCEDURE — 99213 OFFICE O/P EST LOW 20 MIN: CPT | Mod: S$GLB,,, | Performed by: NURSE PRACTITIONER

## 2020-06-06 RX ORDER — FAMOTIDINE 20 MG/1
20 TABLET, FILM COATED ORAL 2 TIMES DAILY
Qty: 20 TABLET | Refills: 0 | Status: SHIPPED | OUTPATIENT
Start: 2020-06-06 | End: 2020-06-10

## 2020-06-06 RX ORDER — CETIRIZINE HYDROCHLORIDE 10 MG/1
10 TABLET ORAL DAILY
Qty: 30 TABLET | Refills: 0 | Status: SHIPPED | OUTPATIENT
Start: 2020-06-06 | End: 2021-01-05 | Stop reason: CLARIF

## 2020-06-06 RX ORDER — DABIGATRAN ETEXILATE MESYLATE 150 MG/1
150 CAPSULE ORAL 2 TIMES DAILY
COMMUNITY
Start: 2020-05-09 | End: 2020-09-14

## 2020-06-06 RX ORDER — TRIAMCINOLONE ACETONIDE 0.25 MG/G
CREAM TOPICAL 2 TIMES DAILY
Qty: 80 G | Refills: 0 | Status: ON HOLD | OUTPATIENT
Start: 2020-06-06 | End: 2020-06-23

## 2020-06-06 NOTE — PROGRESS NOTES
Subjective:       Patient ID: Ayla Dela Cruz is a 75 y.o. female.    Vitals:  weight is 54 kg (119 lb). Her temperature is 98.9 °F (37.2 °C). Her blood pressure is 149/79 (abnormal) and her pulse is 91. Her respiration is 12 and oxygen saturation is 97%.     Chief Complaint: Itching    Patient reports she began taking Pradaxa in March 2020. Reports she has been itching since starting the new medication. Patient reports her provider is aware, but told her he will evaluate her when the pandemic calms down. Patient reports this is the 5th blood thinner she has tried and everyone makes her itch. Patient has an extensive allergy list.         Itching   This is a new problem. The current episode started 1 to 4 weeks ago (2 weeks ). The problem occurs constantly. Pertinent negatives include no arthralgias, chest pain, chills, congestion, coughing, fatigue, fever, headaches, joint swelling, myalgias, nausea, rash, sore throat, vertigo, vomiting or weakness. Associated symptoms comments: All over body itchiness   . Treatments tried: OTC, creams and benadryl  The treatment provided no relief.       Constitution: Negative for chills, fatigue and fever.   HENT: Negative for congestion and sore throat.    Neck: Negative for painful lymph nodes.   Cardiovascular: Negative for chest pain and leg swelling.   Eyes: Negative for double vision and blurred vision.   Respiratory: Negative for cough and shortness of breath.    Gastrointestinal: Negative for nausea, vomiting and diarrhea.   Genitourinary: Negative for dysuria, frequency, urgency and history of kidney stones.   Musculoskeletal: Negative for joint pain, joint swelling, muscle cramps and muscle ache.   Skin: Negative for color change, pale, rash and bruising.   Allergic/Immunologic: Positive for itching. Negative for seasonal allergies.   Neurological: Negative for dizziness, history of vertigo, light-headedness, passing out and headaches.   Hematologic/Lymphatic:  Negative for swollen lymph nodes.   Psychiatric/Behavioral: Negative for nervous/anxious, sleep disturbance and depression. The patient is not nervous/anxious.        Objective:      Physical Exam   Constitutional: She is oriented to person, place, and time. Vital signs are normal. She appears well-developed and well-nourished. She is cooperative.  Non-toxic appearance. She does not have a sickly appearance. She does not appear ill. No distress.   HENT:   Head: Normocephalic and atraumatic.   Right Ear: Hearing, tympanic membrane, external ear and ear canal normal.   Left Ear: Hearing, tympanic membrane, external ear and ear canal normal.   Nose: Nose normal. No mucosal edema, rhinorrhea or nasal deformity. No epistaxis. Right sinus exhibits no maxillary sinus tenderness and no frontal sinus tenderness. Left sinus exhibits no maxillary sinus tenderness and no frontal sinus tenderness.   Mouth/Throat: Uvula is midline, oropharynx is clear and moist and mucous membranes are normal. No trismus in the jaw. Normal dentition. No uvula swelling. No posterior oropharyngeal erythema.   Eyes: Conjunctivae and lids are normal. Right eye exhibits no discharge. Left eye exhibits no discharge. No scleral icterus.   Neck: Trachea normal, normal range of motion, full passive range of motion without pain and phonation normal. Neck supple.   Cardiovascular: Normal rate, regular rhythm, normal heart sounds, intact distal pulses and normal pulses.   Pulmonary/Chest: Effort normal and breath sounds normal. No respiratory distress.   Abdominal: Soft. Normal appearance and bowel sounds are normal. She exhibits no distension, no pulsatile midline mass and no mass. There is no tenderness.   Musculoskeletal: Normal range of motion. She exhibits no edema or deformity.   Neurological: She is alert and oriented to person, place, and time. She exhibits normal muscle tone. Coordination normal. GCS eye subscore is 4. GCS verbal subscore is 5. GCS  motor subscore is 6.   Skin: Skin is warm, dry, intact, not diaphoretic, not pale and no rash.   No rash noted. No scratch marks noted from fingernails. I did not witness patient being uncomfortable or scratching skin while in the exam room.    Psychiatric: She has a normal mood and affect. Her speech is normal and behavior is normal. Judgment and thought content normal. Cognition and memory are normal.   Nursing note and vitals reviewed.        Assessment:       1. Chronic pruritus        Plan:       Advised patient I do not feel comfortable prescribing Prednisone due to her extensive medical history. Will treat with Kenalog cream, pepcid and zyrtec. Advised Anti-itch spray OTC. Advised to attend her PCP appointment on Wednesday. Discussed possibility of her chronic itching being due to food and may not be the blood thinners after all. Due to her extensive medication allergy list and the chronic itching, I will refer to Allergist.     Chronic pruritus  -     Ambulatory referral/consult to Allergy    Other orders  -     famotidine (PEPCID) 20 MG tablet; Take 1 tablet (20 mg total) by mouth 2 (two) times daily. for 10 days  Dispense: 20 tablet; Refill: 0  -     cetirizine (ZYRTEC) 10 MG tablet; Take 1 tablet (10 mg total) by mouth once daily.  Dispense: 30 tablet; Refill: 0  -     triamcinolone acetonide 0.025% (KENALOG) 0.025 % cream; Apply topically 2 (two) times daily.  Dispense: 80 g; Refill: 0

## 2020-06-08 ENCOUNTER — TELEPHONE (OUTPATIENT)
Dept: FAMILY MEDICINE | Facility: CLINIC | Age: 75
End: 2020-06-08

## 2020-06-08 ENCOUNTER — DOCUMENTATION ONLY (OUTPATIENT)
Dept: FAMILY MEDICINE | Facility: CLINIC | Age: 75
End: 2020-06-08

## 2020-06-08 NOTE — PROGRESS NOTES
Pre-Visit Chart Review  For Appointment Scheduled on 6-10-20    Health Maintenance Due   Topic Date Due    TETANUS VACCINE  05/04/2016

## 2020-06-09 ENCOUNTER — PATIENT OUTREACH (OUTPATIENT)
Dept: ADMINISTRATIVE | Facility: OTHER | Age: 75
End: 2020-06-09

## 2020-06-09 DIAGNOSIS — M47.892 OTHER SPONDYLOSIS, CERVICAL REGION: Primary | ICD-10-CM

## 2020-06-09 DIAGNOSIS — Z01.818 PREOP TESTING: ICD-10-CM

## 2020-06-10 ENCOUNTER — OFFICE VISIT (OUTPATIENT)
Dept: FAMILY MEDICINE | Facility: CLINIC | Age: 75
End: 2020-06-10
Attending: FAMILY MEDICINE
Payer: MEDICARE

## 2020-06-10 VITALS
HEIGHT: 67 IN | RESPIRATION RATE: 16 BRPM | BODY MASS INDEX: 18.62 KG/M2 | SYSTOLIC BLOOD PRESSURE: 128 MMHG | WEIGHT: 118.63 LBS | DIASTOLIC BLOOD PRESSURE: 74 MMHG | HEART RATE: 77 BPM | OXYGEN SATURATION: 96 % | TEMPERATURE: 98 F

## 2020-06-10 DIAGNOSIS — F33.41 RECURRENT MAJOR DEPRESSIVE DISORDER, IN PARTIAL REMISSION: ICD-10-CM

## 2020-06-10 DIAGNOSIS — E78.5 HYPERLIPIDEMIA, UNSPECIFIED HYPERLIPIDEMIA TYPE: ICD-10-CM

## 2020-06-10 DIAGNOSIS — I50.32 CHRONIC DIASTOLIC HEART FAILURE: ICD-10-CM

## 2020-06-10 DIAGNOSIS — Z11.59 SCREENING FOR VIRAL DISEASE: ICD-10-CM

## 2020-06-10 DIAGNOSIS — M47.896 OTHER SPONDYLOSIS, LUMBAR REGION: ICD-10-CM

## 2020-06-10 DIAGNOSIS — I48.0 PAROXYSMAL ATRIAL FIBRILLATION: ICD-10-CM

## 2020-06-10 DIAGNOSIS — I10 ESSENTIAL HYPERTENSION: ICD-10-CM

## 2020-06-10 DIAGNOSIS — F41.1 GAD (GENERALIZED ANXIETY DISORDER): ICD-10-CM

## 2020-06-10 DIAGNOSIS — K21.9 GASTROESOPHAGEAL REFLUX DISEASE WITHOUT ESOPHAGITIS: ICD-10-CM

## 2020-06-10 DIAGNOSIS — J45.30 MILD PERSISTENT ASTHMA WITHOUT COMPLICATION: ICD-10-CM

## 2020-06-10 DIAGNOSIS — R11.0 NAUSEA: ICD-10-CM

## 2020-06-10 DIAGNOSIS — Z00.00 PREVENTATIVE HEALTH CARE: Primary | ICD-10-CM

## 2020-06-10 DIAGNOSIS — Z79.01 ANTICOAGULANT LONG-TERM USE: ICD-10-CM

## 2020-06-10 DIAGNOSIS — Z86.73 H/O: CVA (CEREBROVASCULAR ACCIDENT): ICD-10-CM

## 2020-06-10 DIAGNOSIS — J84.9 INTERSTITIAL LUNG DISEASE: ICD-10-CM

## 2020-06-10 DIAGNOSIS — D50.0 IRON DEFICIENCY ANEMIA DUE TO CHRONIC BLOOD LOSS: ICD-10-CM

## 2020-06-10 DIAGNOSIS — N28.0 RENAL INFARCT: ICD-10-CM

## 2020-06-10 PROCEDURE — 1126F PR PAIN SEVERITY QUANTIFIED, NO PAIN PRESENT: ICD-10-PCS | Mod: S$GLB,,, | Performed by: FAMILY MEDICINE

## 2020-06-10 PROCEDURE — 99214 OFFICE O/P EST MOD 30 MIN: CPT | Mod: S$GLB,,, | Performed by: FAMILY MEDICINE

## 2020-06-10 PROCEDURE — 1101F PR PT FALLS ASSESS DOC 0-1 FALLS W/OUT INJ PAST YR: ICD-10-PCS | Mod: S$GLB,,, | Performed by: FAMILY MEDICINE

## 2020-06-10 PROCEDURE — 99999 PR PBB SHADOW E&M-EST. PATIENT-LVL IV: CPT | Mod: PBBFAC,,, | Performed by: FAMILY MEDICINE

## 2020-06-10 PROCEDURE — 3074F SYST BP LT 130 MM HG: CPT | Mod: S$GLB,,, | Performed by: FAMILY MEDICINE

## 2020-06-10 PROCEDURE — 1159F PR MEDICATION LIST DOCUMENTED IN MEDICAL RECORD: ICD-10-PCS | Mod: S$GLB,,, | Performed by: FAMILY MEDICINE

## 2020-06-10 PROCEDURE — 3074F PR MOST RECENT SYSTOLIC BLOOD PRESSURE < 130 MM HG: ICD-10-PCS | Mod: S$GLB,,, | Performed by: FAMILY MEDICINE

## 2020-06-10 PROCEDURE — 99214 PR OFFICE/OUTPT VISIT, EST, LEVL IV, 30-39 MIN: ICD-10-PCS | Mod: S$GLB,,, | Performed by: FAMILY MEDICINE

## 2020-06-10 PROCEDURE — 1101F PT FALLS ASSESS-DOCD LE1/YR: CPT | Mod: S$GLB,,, | Performed by: FAMILY MEDICINE

## 2020-06-10 PROCEDURE — 3078F DIAST BP <80 MM HG: CPT | Mod: S$GLB,,, | Performed by: FAMILY MEDICINE

## 2020-06-10 PROCEDURE — 1126F AMNT PAIN NOTED NONE PRSNT: CPT | Mod: S$GLB,,, | Performed by: FAMILY MEDICINE

## 2020-06-10 PROCEDURE — 1159F MED LIST DOCD IN RCRD: CPT | Mod: S$GLB,,, | Performed by: FAMILY MEDICINE

## 2020-06-10 PROCEDURE — 3078F PR MOST RECENT DIASTOLIC BLOOD PRESSURE < 80 MM HG: ICD-10-PCS | Mod: S$GLB,,, | Performed by: FAMILY MEDICINE

## 2020-06-10 PROCEDURE — 99999 PR PBB SHADOW E&M-EST. PATIENT-LVL IV: ICD-10-PCS | Mod: PBBFAC,,, | Performed by: FAMILY MEDICINE

## 2020-06-10 RX ORDER — PANTOPRAZOLE SODIUM 40 MG/1
40 TABLET, DELAYED RELEASE ORAL DAILY
Qty: 90 TABLET | Refills: 3 | Status: SHIPPED | OUTPATIENT
Start: 2020-06-10 | End: 2021-02-04 | Stop reason: CLARIF

## 2020-06-10 RX ORDER — HALOBETASOL PROPIONATE 0.5 MG/G
1 CREAM TOPICAL 2 TIMES DAILY PRN
COMMUNITY
Start: 2020-04-08 | End: 2021-01-05 | Stop reason: CLARIF

## 2020-06-10 RX ORDER — ONDANSETRON 8 MG/1
8 TABLET, ORALLY DISINTEGRATING ORAL ONCE
Qty: 12 TABLET | Refills: 5 | Status: SHIPPED | OUTPATIENT
Start: 2020-06-10 | End: 2020-06-10

## 2020-06-10 RX ORDER — PRAMOXINE HYDROCHLORIDE 10 MG/G
AEROSOL, FOAM TOPICAL 3 TIMES DAILY PRN
COMMUNITY
End: 2021-01-05 | Stop reason: CLARIF

## 2020-06-10 NOTE — PATIENT INSTRUCTIONS
Controlling High Blood Pressure  High blood pressure (hypertension) is often called the silent killer. This is because many people who have it dont know it. High blood pressure is defined as 140/90 mm Hg or higher. Know your blood pressure and remember to check it regularly. Doing so can save your life. Here are some things you can do to help control your blood pressure.    Choose heart-healthy foods  · Select low-salt, low-fat foods. Limit sodium intake to 2,400 mg per day or the amount suggested by your healthcare provider.  · Limit canned, dried, cured, packaged, and fast foods. These can contain a lot of salt.  · Eat 8 to 10 servings of fruits and vegetables every day.  · Choose lean meats, fish, or chicken.  · Eat whole-grain pasta, brown rice, and beans.  · Eat 2 to 3 servings of low-fat or fat-free dairy products.  · Ask your doctor about the DASH eating plan. This plan helps reduce blood pressure.  · When you go to a restaurant, ask that your meal be prepared with no added salt.  Maintain a healthy weight  · Ask your healthcare provider how many calories to eat a day. Then stick to that number.  · Ask your healthcare provider what weight range is healthiest for you. If you are overweight, a weight loss of only 3% to 5% of your body weight can help lower blood pressure. Generally, a good weight loss goal is to lose 10% of your body weight in a year.  · Limit snacks and sweets.  · Get regular exercise.  Get up and get active  · Choose activities you enjoy. Find ones you can do with friends or family. This includes bicycling, dancing, walking, and jogging.  · Park farther away from building entrances.  · Use stairs instead of the elevator.  · When you can, walk or bike instead of driving.  · Lester leaves, garden, or do household repairs.  · Be active at a moderate to vigorous level of physical activity for at least 40 minutes for a minimum of 3 to 4 days a week.   Manage stress  · Make time to relax and enjoy  life. Find time to laugh.  · Communicate your concerns with your loved ones and your healthcare provider.  · Visit with family and friends, and keep up with hobbies.  Limit alcohol and quit smoking  · Men should have no more than 2 drinks per day.  · Women should have no more than 1 drink per day.  · Talk with your healthcare provider about quitting smoking. Smoking significantly increases your risk for heart disease and stroke. Ask your healthcare provider about community smoking cessation programs and other options.  Medicines  If lifestyle changes arent enough, your healthcare provider may prescribe high blood pressure medicine. Take all medicines as prescribed. If you have any questions about your medicines, ask your healthcare provider before stopping or changing them.   Date Last Reviewed: 4/27/2016  © 6170-7823 [employer]. [address] [place] [address], [place] [address]. All rights reserved. This information is not intended as a substitute for professional medical care. Always follow your healthcare professional's instructions.

## 2020-06-10 NOTE — PROGRESS NOTES
Subjective:       Patient ID: Ayla Dela Cruz is a 75 y.o. female.    Chief Complaint: Annual Exam    75-year-old female who I have not seen since November of 2014 comes in for a physical examination.  She has a history atrial fibrillation with three previous ablations who suffered a CVA presumed secondary to the atrial fib.  She has had a number of changes of her anticoagulant due to intolerance and currently is having a rash that she thinks may be due to her current anticoagulant, Pradaxa.  She is being followed by Dr. Jurado and also has been seen by Dr. Calderon.  She has additional history of congestive heart failure, deep vein thrombosis, hypertension, hyperlipidemia, mild persistent asthma and spondylosis of the lumbar spine for which she is being treated by Dr. Clancy.  In the near future she is to undergo a procedure with him but I am not sure what is planned.  Her current only complaint is the rash.  She does use fabric softener occasionally but uses a non detergent ionic .  She has an upcoming appointment tomorrow with allergy.  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  She uses low-dose gabapentin at night for her back pain finding that it also helps her sleep.    Past Medical History:  No date: Anticoagulant long-term use  No date: Anxiety  No date: Arthritis  No date: Atrial fibrillation  No date: Cancer      Comment:  skin  No date: CHF (congestive heart failure)  No date: Depression  No date: DVT (deep venous thrombosis)  No date: GERD (gastroesophageal reflux disease)  No date: Glaucoma (increased eye pressure)  No date: Hyperlipidemia      Comment:  diet controlled  No date: Hypertension  No date: Interstitial lung disease  1/10/2020: Localized hives  11/12/2018: Mild persistent asthma without complication  1/31/2014: Pneumonia  6-3-14: Stroke  No date: Stroke  No date: TIA (transient  ischemic attack)  No date: TIA (transient ischemic attack)    Past Surgical History:  No date: 2 heart ablations      Comment:  3 in total  No date: bilateral cataracts  No date: CHOLECYSTECTOMY  1/19/2017: COLONOSCOPY; N/A      Comment:  Procedure: COLONOSCOPY and EGD;  Surgeon: Jonathan Schultz MD;  Location: [facility] ENDO;  Service: Endoscopy;                 Laterality: N/A;  10/10/2019: COLONOSCOPY; N/A      Comment:  Procedure: COLONOSCOPY;  Surgeon: Alex Christian MD;                Location: [facility] ENDO;  Service: Endoscopy;  Laterality:                N/A;  10/14/2019: ESOPHAGOGASTRODUODENOSCOPY; N/A      Comment:  Procedure: EGD (ESOPHAGOGASTRODUODENOSCOPY);  Surgeon:                Alex Christian MD;  Location: [facility] ENDO;  Service:                Endoscopy;  Laterality: N/A;  6/7/2018: INJECTION OF ANESTHETIC AGENT AROUND MEDIAL BRANCH NERVES   INNERVATING CERVICAL FACET JOINT; Right      Comment:  Procedure: BLOCK, NERVE, FACET JOINT, MEDIAL BRANCH,                CERVICAL;  Surgeon: Galo Clancy MD;  Location: [facility] OR;                 Service: Pain Management;  Laterality: Right;  C4, 5, 6  11/1/2018: OPEN REDUCTION AND INTERNAL FIXATION (ORIF) OF INJURY OF   ANKLE; Right      Comment:  Procedure: ORIF, ANKLE;  Surgeon: Wilfredo Aguero MD;                Location: [facility] OR;  Service: Orthopedics;  Laterality:                Right;  No date: pyloristenosis  9/21/2018: RADIOFREQUENCY ABLATION OF LUMBAR MEDIAL BRANCH NERVE AT   SINGLE LEVEL; Bilateral      Comment:  Procedure: RADIOFREQUENCY ABLATION, NERVE, SPINAL,                LUMBAR, MEDIAL BRANCH, 1 LEVEL;  Surgeon: Galo Clancy MD;               Location: [facility] OR;  Service: Pain Management;                 Laterality: Bilateral;  L3, 4, 5  2/19/2019: RADIOFREQUENCY ABLATION OF LUMBAR MEDIAL BRANCH NERVE AT   SINGLE LEVEL; Bilateral      Comment:  Procedure: Radiofrequency Ablation, Nerve, Spinal,                Lumbar, Medial Branch, 1  Level;  Surgeon: Galo Clancy MD;               Location: [facility];  Service: Pain Management;                 Laterality: Bilateral;  L3, 4, 5   3/10/2020: RADIOFREQUENCY ABLATION OF LUMBAR MEDIAL BRANCH NERVE AT   SINGLE LEVEL; Bilateral      Comment:  Procedure: Radiofrequency Ablation, Nerve, Spinal,                Lumbar, Medial Branch, 1 Level;  Surgeon: Galo Clancy MD;               Location: [facility] OR;  Service: Pain Management;                 Laterality: Bilateral;  L3,4,5 - Burned at 80 degrees C.                for 60 seconds x 2 each site    7/3/2018: RADIOFREQUENCY THERMAL COAGULATION OF MEDIAL BRANCH OF   POSTERIOR RAMUS OF CERVICAL SPINAL NERVE; Right      Comment:  Procedure: RADIOFREQUENCY THERMAL COAGULATION, NERVE,                SPINAL, CERVICAL, MEDIAL BRANCH OF POSTERIOR RAMUS;                 Surgeon: Galo Clancy MD;  Location: [facility] OR;  Service:                Pain Management;  Laterality: Right;  C4,5,6 - Burned at                80 degrees C. for 75 seconds each site  7/23/2019: RADIOFREQUENCY THERMAL COAGULATION OF MEDIAL BRANCH OF   POSTERIOR RAMUS OF CERVICAL SPINAL NERVE; Right      Comment:  Procedure: RADIOFREQUENCY THERMAL COAGULATION, NERVE,                SPINAL, CERVICAL, POSTERIOR RAMUS, MEDIAL BRANCH;                 Surgeon: Galo Clancy MD;  Location: [facility];  Service:                Pain Management;  Laterality: Right;  C4,5,6  9/10/2019: RADIOFREQUENCY THERMOCOAGULATION; Bilateral      Comment:  Procedure: RADIOFREQUENCY THERMAL COAGULATION LUMBAR;                 Surgeon: Galo Clancy MD;  Location: [facility];  Service:                Pain Management;  Laterality: Bilateral;  L3,4,5 - Burned               at 80 degrees C. for 60 seconds x 2 each site  No date: skin cancer removal   No date: TONSILLECTOMY    Review of patient's family history indicates:  Problem: Heart disease      Relation: Father          Age of Onset: (Not Specified)  Problem: Ulcers      Relation: Father           Age of Onset: (Not Specified)  Problem: Arthritis      Relation: Mother          Age of Onset: (Not Specified)  Problem: Asthma      Relation: Mother          Age of Onset: (Not Specified)  Problem: Rheum arthritis      Relation: Mother          Age of Onset: (Not Specified)  Problem: Pneumonia      Relation: Mother          Age of Onset: (Not Specified)  Problem: Depression      Relation: Son          Age of Onset: (Not Specified)  Problem: Alzheimer's disease      Relation: Maternal Uncle          Age of Onset: (Not Specified)  Problem: Rheum arthritis      Relation: Maternal Grandmother          Age of Onset: (Not Specified)  Problem: Emphysema      Relation: Maternal Grandfather          Age of Onset: (Not Specified)  Problem: Cancer      Relation: Maternal Grandfather          Age of Onset: (Not Specified)          Comment: kidney  Problem: Kidney disease      Relation: Maternal Grandfather          Age of Onset: (Not Specified)  Problem: Cancer      Relation: Paternal Grandmother          Age of Onset: (Not Specified)          Comment: lung= smoker  Problem: Pneumonia      Relation: Paternal Grandfather          Age of Onset: (Not Specified)  Problem: Breast cancer      Relation: Neg Hx          Age of Onset: (Not Specified)  Problem: Ovarian cancer      Relation: Neg Hx          Age of Onset: (Not Specified)    Social History    Tobacco Use      Smoking status: Never Smoker      Smokeless tobacco: Never Used    Alcohol use: No      Frequency: Monthly or less      Drinks per session: 1 or 2      Binge frequency: Never    Drug use: No    Current Outpatient Medications on File Prior to Visit:  ammonium lactate (LAC-HYDRIN) 12 % lotion, Apply topically once daily. , Disp: , Rfl:   ATROPINE SULFATE, PF, OPHT, Place 1 % into the right eye once daily. , Disp: , Rfl:   cariprazine (VRAYLAR) 1.5 mg Cap, Take 1 capsule by mouth every evening. DANIELA Enriquez, Disp: , Rfl:   cetirizine (ZYRTEC) 10 MG tablet, Take 1 tablet  (10 mg total) by mouth once daily., Disp: 30 tablet, Rfl: 0  dorzolamide-timolol (COSOPT) 2-0.5 % ophthalmic solution, Place 1 drop into both eyes 2 (two) times daily. Twice a day, Disp: , Rfl:   fluticasone (FLONASE) 50 mcg/actuation nasal spray, 1 spray (50 mcg total) by Each Nare route once daily., Disp: 1 Bottle, Rfl: 11  fluticasone furoate-vilanterol (BREO ELLIPTA) 200-25 mcg/dose DsDv diskus inhaler, Inhale 1 puff into the lungs once daily., Disp: 1 each, Rfl: 11  halobetasol (ULTRAVATE) 0.05 % cream, 1 application 2 (two) times daily as needed. Apply to affected area, Disp: , Rfl:   LORazepam (ATIVAN) 0.5 MG tablet, Take 0.5 mg by mouth every 12 (twelve) hours as needed for Anxiety. Leticia Enriquez, Disp: , Rfl:   metoprolol tartrate (LOPRESSOR) 50 MG tablet, TAKE 1 TABLET (50 MG TOTAL) BY MOUTH 2 (TWO) TIMES DAILY., Disp: 180 tablet, Rfl: 3  PAZEO 0.7 % Drop, Place into both eyes as needed. , Disp: , Rfl:   PRADAXA 150 mg Cap, Take 150 mg by mouth 2 (two) times daily. , Disp: , Rfl:   pramoxine 1 % Foam, Place rectally 3 (three) times daily as needed., Disp: , Rfl:   promethazine (PHENERGAN) 12.5 MG Supp, Place 1 suppository (12.5 mg total) rectally every 6 (six) hours as needed., Disp: 12 suppository, Rfl: 0  rosuvastatin (CRESTOR) 40 MG Tab, TAKE 1 TABLET (40 MG TOTAL) BY MOUTH EVERY EVENING. (Patient taking differently: Take 20 mg by mouth every evening. ), Disp: 90 tablet, Rfl: 2  triamcinolone acetonide 0.025% (KENALOG) 0.025 % cream, Apply topically 2 (two) times daily., Disp: 80 g, Rfl: 0  VIIBRYD 40 mg Tab tablet, Take 40 mg by mouth once daily. DANIELA Enriquez, Disp: , Rfl: 2  zinc oxide (BOUDREAUXS BUTT PASTE) 16 % Oint ointment, Apply topically daily as needed., Disp: , Rfl:   (DISCONTINUED) pantoprazole (PROTONIX) 40 MG tablet, Take 1 tablet (40 mg total) by mouth once daily. (Patient taking differently: Take 40 mg by mouth once daily. ), Disp: 90 tablet, Rfl: 3  (DISCONTINUED)  clotrimazole-betamethasone 1-0.05% (LOTRISONE) cream, Apply topically once daily. for 21 days (Patient not taking: Reported on 6/6/2020), Disp: 45 g, Rfl: 2  (DISCONTINUED) edoxaban (SAVAYSA) 60 mg Tab tablet, Take by mouth once daily., Disp: , Rfl:   (DISCONTINUED) famotidine (PEPCID) 20 MG tablet, Take 1 tablet (20 mg total) by mouth 2 (two) times daily. for 10 days (Patient not taking: Reported on 6/10/2020), Disp: 20 tablet, Rfl: 0  (DISCONTINUED) gabapentin (NEURONTIN) 300 MG capsule, TAKE 1 CAPSULE BY MOUTH THREE TIMES A DAY (Patient not taking: Reported on 6/6/2020), Disp: 270 capsule, Rfl: 0  (DISCONTINUED) levalbuterol (XOPENEX HFA) 45 mcg/actuation inhaler, Inhale 1-2 puffs into the lungs every 4 (four) hours as needed for Wheezing. This is a rescue inhaler medication and should be used as needed (Patient not taking: Reported on 6/6/2020), Disp: 1 Inhaler, Rfl: 11  (DISCONTINUED) ondansetron (ZOFRAN-ODT) 8 MG TbDL, Take 1 tablet (8 mg total) by mouth once., Disp: 6 tablet, Rfl: 0    Current Facility-Administered Medications on File Prior to Visit:  bupivacaine (PF) 0.25% (2.5 mg/ml) injection, , , PRN, Galo Clancy MD, 6 mL at 02/19/19 1314  lidocaine (PF) 10 mg/ml (1%) injection, , , PRN, Galo Clancy MD, 9 mL at 02/19/19 1304  lidocaine (PF) 20 mg/ml (2%) injection, , , PRN, Galo Clancy MD, 6 mL at 02/19/19 1310  methylPREDNISolone acetate injection, , , PRN, Galo Clancy MD, 80 mg at 02/19/19 1314            Review of Systems   Constitutional: Negative for chills, diaphoresis, fatigue, fever and unexpected weight change.   HENT: Negative for congestion, ear pain, hearing loss, postnasal drip and sinus pressure.    Eyes: Negative for itching and visual disturbance.   Respiratory: Negative for cough, chest tightness, shortness of breath and wheezing.    Cardiovascular: Negative for chest pain, palpitations and leg swelling.   Gastrointestinal: Negative for abdominal pain, blood in stool, constipation,  diarrhea, nausea (Rare nausea for which she uses Zofran when necessary) and vomiting.   Genitourinary: Negative for dysuria, frequency, hematuria, menstrual problem, pelvic pain, vaginal bleeding and vaginal discharge.   Musculoskeletal: Positive for back pain. Negative for arthralgias, joint swelling and myalgias.   Skin: Positive for rash.   Neurological: Negative for dizziness and headaches.   Hematological: Negative for adenopathy.   Psychiatric/Behavioral: Positive for sleep disturbance. The patient is not nervous/anxious.        Objective:      Physical Exam   Constitutional: She is oriented to person, place, and time. She appears well-developed and well-nourished. No distress.   Good blood pressure control  Underweight with a BMI of 18.6 she is down 2.9 lb from her last visit with me November 12, 2014   HENT:   Head: Normocephalic and atraumatic.   Right Ear: External ear normal.   Left Ear: External ear normal.   Nose: Nose normal.   Mouth/Throat: Oropharynx is clear and moist. No oropharyngeal exudate.   Eyes: Pupils are equal, round, and reactive to light. Conjunctivae and EOM are normal. Right eye exhibits no discharge. Left eye exhibits no discharge. No scleral icterus.   Neck: Normal range of motion. Neck supple. No JVD present. No tracheal deviation present. No thyromegaly present.   Cardiovascular: Normal rate, regular rhythm and normal heart sounds. Exam reveals no gallop and no friction rub.   No murmur heard.  Pulmonary/Chest: Effort normal and breath sounds normal. No stridor. No respiratory distress. She has no wheezes. She has no rales. She exhibits no tenderness.   Abdominal: Soft. Bowel sounds are normal. She exhibits no distension and no mass. There is no tenderness. There is no rebound and no guarding. No hernia.   Musculoskeletal: Normal range of motion. She exhibits no edema or tenderness.   Lymphadenopathy:     She has no cervical adenopathy.   Neurological: She is alert and oriented to  person, place, and time. She has normal reflexes. She displays normal reflexes. No cranial nerve deficit or sensory deficit. She exhibits normal muscle tone.   Skin: Skin is warm and dry. No rash noted. She is not diaphoretic. No erythema. No pallor.   Psychiatric: She has a normal mood and affect. Her behavior is normal. Judgment and thought content normal.   Nursing note and vitals reviewed.      Assessment:       1. Preventative health care    2. H/O: CVA (cerebrovascular accident), June 2014    3. Paroxysmal atrial fibrillation, ablations X 3 1995, 1997, 9/2017, CHADS-VAS score 5, HAS-BLED score 5     4. Essential hypertension    5. Hyperlipidemia, unspecified hyperlipidemia type    6. Chronic diastolic heart failure, 2014    7. Anticoagulant long-term use    8. Iron deficiency anemia due to chronic blood loss    9. Gastroesophageal reflux disease without esophagitis    10. Screening for viral disease    11. Nausea    12. Interstitial lung disease    13. Mild persistent asthma without complication    14. Recurrent major depressive disorder, in partial remission    15. JOSE (generalized anxiety disorder)    16. Other spondylosis, lumbar region    17. Renal infarct, due to embolism off Eliquis for 7 days    18. Body mass index (BMI) 19.9 or less, adult        Plan:       1. Preventative health care    2. H/O: CVA (cerebrovascular accident), June 2014  Stable with minimal evident impairment    3. Paroxysmal atrial fibrillation, ablations X 3 1995, 1997, 9/2017, CHADS-VAS score 5, HAS-BLED score 5   Currently experiences wandering atrial pacemaker at times  - Comprehensive metabolic panel; Future    4. Essential hypertension  Good control with no changes needed  - Comprehensive metabolic panel; Future    5. Hyperlipidemia, unspecified hyperlipidemia type  Lab Results   Component Value Date    CHOL 142 01/31/2020    CHOL 188 01/29/2019    CHOL 136 01/26/2018     Lab Results   Component Value Date    HDL 42 01/31/2020     HDL 85 (H) 01/29/2019    HDL 53 01/26/2018     Lab Results   Component Value Date    LDLCALC 82.4 01/31/2020    LDLCALC 90.6 01/29/2019    LDLCALC 68.2 01/26/2018     Lab Results   Component Value Date    TRIG 88 01/31/2020    TRIG 62 01/29/2019    TRIG 74 01/26/2018     Lab Results   Component Value Date    CHOLHDL 29.6 01/31/2020    CHOLHDL 45.2 01/29/2019    CHOLHDL 39.0 01/26/2018     Good control, no changes needed  - Comprehensive metabolic panel; Future    6. Chronic diastolic heart failure, 2014  Followed by Cardiology    7. Anticoagulant long-term use  Currently on Pradaxa    8. Iron deficiency anemia due to chronic blood loss  Lab Results   Component Value Date    WBC 8.37 01/24/2020    HGB 11.7 (L) 01/24/2020    HCT 37.0 01/24/2020    MCV 94 01/24/2020     01/24/2020         - CBC auto differential; Future    9. Gastroesophageal reflux disease without esophagitis  Well controlled with Protonix  - pantoprazole (PROTONIX) 40 MG tablet; Take 1 tablet (40 mg total) by mouth once daily.  Dispense: 90 tablet; Refill: 3    10. Screening for viral disease  Patient had a flu-like illness in late January with negative flu swab for a and B.  She thinks she may have had COVID-19 it would like to be tested for antibodies  - COVID-19 (SARS CoV-2) IgG Antibody; Future    11. Nausea  Needs refill on Zofran ODT  - ondansetron (ZOFRAN-ODT) 8 MG TbDL; Take 1 tablet (8 mg total) by mouth once. for 1 dose  Dispense: 12 tablet; Refill: 5    12. Interstitial lung disease  Chronic dyspnea on exertion    13. Mild persistent asthma without complication  Occasional use of inhalers    14. Recurrent major depressive disorder, in partial remission  Stable    15. JOSE (generalized anxiety disorder)  Stable    16. Other spondylosis, lumbar region  Followed by Dr. Clancy    17. Renal infarct, due to embolism off Eliquis for 7 days      18. Body mass index (BMI) 19.9 or less, adult

## 2020-06-11 ENCOUNTER — OFFICE VISIT (OUTPATIENT)
Dept: ALLERGY | Facility: CLINIC | Age: 75
End: 2020-06-11
Payer: MEDICARE

## 2020-06-11 ENCOUNTER — LAB VISIT (OUTPATIENT)
Dept: LAB | Facility: HOSPITAL | Age: 75
End: 2020-06-11
Attending: FAMILY MEDICINE
Payer: MEDICARE

## 2020-06-11 VITALS — WEIGHT: 120.31 LBS | TEMPERATURE: 98 F | BODY MASS INDEX: 18.88 KG/M2 | HEIGHT: 67 IN

## 2020-06-11 DIAGNOSIS — L29.9 PRURITUS: ICD-10-CM

## 2020-06-11 DIAGNOSIS — I48.0 PAROXYSMAL ATRIAL FIBRILLATION: ICD-10-CM

## 2020-06-11 DIAGNOSIS — E78.5 HYPERLIPIDEMIA, UNSPECIFIED HYPERLIPIDEMIA TYPE: ICD-10-CM

## 2020-06-11 DIAGNOSIS — D50.0 IRON DEFICIENCY ANEMIA DUE TO CHRONIC BLOOD LOSS: ICD-10-CM

## 2020-06-11 DIAGNOSIS — I10 ESSENTIAL HYPERTENSION: ICD-10-CM

## 2020-06-11 DIAGNOSIS — R21 RASH: ICD-10-CM

## 2020-06-11 DIAGNOSIS — L50.9 URTICARIA: Primary | ICD-10-CM

## 2020-06-11 DIAGNOSIS — Z11.59 SCREENING FOR VIRAL DISEASE: ICD-10-CM

## 2020-06-11 LAB
ALBUMIN SERPL BCP-MCNC: 3.9 G/DL (ref 3.5–5.2)
ALP SERPL-CCNC: 62 U/L (ref 55–135)
ALT SERPL W/O P-5'-P-CCNC: 11 U/L (ref 10–44)
ANION GAP SERPL CALC-SCNC: 3 MMOL/L (ref 8–16)
AST SERPL-CCNC: 19 U/L (ref 10–40)
BASOPHILS # BLD AUTO: 0.06 K/UL (ref 0–0.2)
BASOPHILS NFR BLD: 0.6 % (ref 0–1.9)
BILIRUB SERPL-MCNC: 1.2 MG/DL (ref 0.1–1)
BUN SERPL-MCNC: 22 MG/DL (ref 8–23)
CALCIUM SERPL-MCNC: 9.6 MG/DL (ref 8.7–10.5)
CHLORIDE SERPL-SCNC: 107 MMOL/L (ref 95–110)
CO2 SERPL-SCNC: 31 MMOL/L (ref 23–29)
CREAT SERPL-MCNC: 0.9 MG/DL (ref 0.5–1.4)
DIFFERENTIAL METHOD: ABNORMAL
EOSINOPHIL # BLD AUTO: 0.4 K/UL (ref 0–0.5)
EOSINOPHIL NFR BLD: 4.2 % (ref 0–8)
ERYTHROCYTE [DISTWIDTH] IN BLOOD BY AUTOMATED COUNT: 14.6 % (ref 11.5–14.5)
EST. GFR  (AFRICAN AMERICAN): >60 ML/MIN/1.73 M^2
EST. GFR  (NON AFRICAN AMERICAN): >60 ML/MIN/1.73 M^2
GLUCOSE SERPL-MCNC: 96 MG/DL (ref 70–110)
HCT VFR BLD AUTO: 45.7 % (ref 37–48.5)
HGB BLD-MCNC: 13.8 G/DL (ref 12–16)
IMM GRANULOCYTES # BLD AUTO: 0.05 K/UL (ref 0–0.04)
IMM GRANULOCYTES NFR BLD AUTO: 0.5 % (ref 0–0.5)
LYMPHOCYTES # BLD AUTO: 0.7 K/UL (ref 1–4.8)
LYMPHOCYTES NFR BLD: 7.1 % (ref 18–48)
MCH RBC QN AUTO: 31 PG (ref 27–31)
MCHC RBC AUTO-ENTMCNC: 30.2 G/DL (ref 32–36)
MCV RBC AUTO: 103 FL (ref 82–98)
MONOCYTES # BLD AUTO: 1.2 K/UL (ref 0.3–1)
MONOCYTES NFR BLD: 12.8 % (ref 4–15)
NEUTROPHILS # BLD AUTO: 7 K/UL (ref 1.8–7.7)
NEUTROPHILS NFR BLD: 74.8 % (ref 38–73)
NRBC BLD-RTO: 0 /100 WBC
PLATELET # BLD AUTO: 278 K/UL (ref 150–350)
PMV BLD AUTO: 9.5 FL (ref 9.2–12.9)
POTASSIUM SERPL-SCNC: 4.4 MMOL/L (ref 3.5–5.1)
PROT SERPL-MCNC: 6.8 G/DL (ref 6–8.4)
RBC # BLD AUTO: 4.45 M/UL (ref 4–5.4)
SARS-COV-2 IGG SERPLBLD QL IA.RAPID: NEGATIVE
SODIUM SERPL-SCNC: 141 MMOL/L (ref 136–145)
WBC # BLD AUTO: 9.39 K/UL (ref 3.9–12.7)

## 2020-06-11 PROCEDURE — 1101F PR PT FALLS ASSESS DOC 0-1 FALLS W/OUT INJ PAST YR: ICD-10-PCS | Mod: S$GLB,,, | Performed by: ALLERGY & IMMUNOLOGY

## 2020-06-11 PROCEDURE — 1159F MED LIST DOCD IN RCRD: CPT | Mod: S$GLB,,, | Performed by: ALLERGY & IMMUNOLOGY

## 2020-06-11 PROCEDURE — 99204 PR OFFICE/OUTPT VISIT, NEW, LEVL IV, 45-59 MIN: ICD-10-PCS | Mod: S$GLB,,, | Performed by: ALLERGY & IMMUNOLOGY

## 2020-06-11 PROCEDURE — 99204 OFFICE O/P NEW MOD 45 MIN: CPT | Mod: S$GLB,,, | Performed by: ALLERGY & IMMUNOLOGY

## 2020-06-11 PROCEDURE — 86769 SARS-COV-2 COVID-19 ANTIBODY: CPT

## 2020-06-11 PROCEDURE — 85025 COMPLETE CBC W/AUTO DIFF WBC: CPT

## 2020-06-11 PROCEDURE — 1101F PT FALLS ASSESS-DOCD LE1/YR: CPT | Mod: S$GLB,,, | Performed by: ALLERGY & IMMUNOLOGY

## 2020-06-11 PROCEDURE — 99999 PR PBB SHADOW E&M-EST. PATIENT-LVL III: ICD-10-PCS | Mod: PBBFAC,,, | Performed by: ALLERGY & IMMUNOLOGY

## 2020-06-11 PROCEDURE — 99999 PR PBB SHADOW E&M-EST. PATIENT-LVL III: CPT | Mod: PBBFAC,,, | Performed by: ALLERGY & IMMUNOLOGY

## 2020-06-11 PROCEDURE — 80053 COMPREHEN METABOLIC PANEL: CPT

## 2020-06-11 PROCEDURE — 1159F PR MEDICATION LIST DOCUMENTED IN MEDICAL RECORD: ICD-10-PCS | Mod: S$GLB,,, | Performed by: ALLERGY & IMMUNOLOGY

## 2020-06-11 PROCEDURE — 36415 COLL VENOUS BLD VENIPUNCTURE: CPT | Mod: PO

## 2020-06-11 RX ORDER — LORATADINE 10 MG
10 TABLET,DISINTEGRATING ORAL 2 TIMES DAILY
Qty: 60 TABLET | Refills: 11 | Status: ON HOLD | OUTPATIENT
Start: 2020-06-11 | End: 2020-06-23

## 2020-06-11 NOTE — LETTER
June 11, 2020      Shayy Delarosa, AGUILAR  [address] [place] [address]           [place] [facility]  [address] [place] [address]  Phone: [phone]          Patient: Ayla Dela Cruz   MR Number: [id number]   YOB: 1945   Date of Visit: 6/11/2020       Dear Shayy Delarosa:    Thank you for referring Ayla Dela Cruz to me for evaluation. Attached you will find relevant portions of my assessment and plan of care.    If you have questions, please do not hesitate to call me. I look forward to following Ayla Dela Cruz along with you.    Sincerely,    Ignacia Mclaughlin MD    Enclosure  CC:  No Recipients    If you would like to receive this communication electronically, please contact [email or web address] or [phone] to request more information on "[employer]" Link access.    For providers and/or their staff who would like to refer a patient to [facility], please contact us through our one-stop-shop provider referral line, [facility], at [phone].    If you feel you have received this communication in error or would no longer like to receive these types of communications, please e-mail [email or web address]

## 2020-06-11 NOTE — H&P (VIEW-ONLY)
"  ALLERGY & IMMUNOLOGY CLINIC - [facility] CONSULTATION     HISTORY OF PRESENT ILLNESS     Patient ID: Ayla Dela Cruz is a 75 y.o. female    CC: query medication allergy    HPI: 74 yo woman presents with her  for initial evaluation. She has a history of sensitive skin. She has had a history of rash for several years. Sometimes the rash looks like welts, sometimes rash looks like a "regular rash" and not welts. Welts are described as mosquito-bite looking reactions that come and go. "Regular rash" is described as areas of bumpy red skin. All rashes itch. Heat and compression (bra straps, waist bands) make the rashes worse. The patient and her  are concerned that the rash is due to blood thinners. She has switched blood thinners multiple times: Xarelto x 3 years, then eliquis, savaysa, pradaxa. Pradaxa started 3/2020. She is still taking pradaxa now. Ayla is requesting allergy testing to blood thinners to see whether she is allergic.     She is on blood thinners because she has had a stroke at age 68. She was off blood thinners once for 7 days for a colonoscopy and developed a renal clot and renal infarction. She does not recall whether she had the rash that week.     There are periods of time when she is on blood thinners and does not get any rashes. When she does get a rash, prednisone has helped. She has had one 5 day course of prednisone in the past 6 months. She has also been prescribed topical steroids, but has been less satisfied with those.  Sees Dr. Rachel Raynaud in [place] for Dermatology, who has prescribed halobetasol cream (Group 1 of topical steroid potency) and triamcinolone cream 0.025%.      She still uses dryer sheets, but has stopped using fabric softener. She uses cetaphil soap and am-lactin lotion.      REVIEW OF SYSTEMS     CONST: no F/C/NS   PSYCH: +depression  EYES: wears glasses, no discharge, no pruritus   EARS: no hearing loss, no sensation of fullness  NOSE: no " "congestion, no rhinorrhea, no itching, no sneezing  PULM: history of COPD, no SOB, no wheezing, no cough  CV: no CP, no palpitations, no leg swelling  MSK: no joint swelling, no joint redness  DERM: see HPI     MEDICAL HISTORY     MedHx: active problems reviewed     PHYSICAL EXAM     VS: Temp 98.1 °F (36.7 °C)   Ht 5' 7" (1.702 m)   Wt 54.6 kg (120 lb 5.2 oz)   BMI 18.85 kg/m²   GENERAL: alert, NAD, well-appearing, cooperative  EYES:  EOMI, no conjunctival injection, no discharge, no infraorbital shiners, wears glasses  LUNGS:  no increased WOB, no cough, speaking in complete sentences  HEART: extremities well-perfused  EXTREMITIES: no c/c/e  DERM: erythematous patches on back, no urticaria, no significant papules, +bruises, no skin breaks, no dystrophic fingernails  NEURO: antalgic gait, no facial asymmetry     LABORATORY STUDIES     No eosinophilia, +Elevated total bili     ALLERGEN TESTING     Never done before     ASSESSMENT & PLAN     Ayla Dela Cruz is a 75 y.o. female with     Rash, per history, there are two morphologies - one is consistent with urticaria and the other with a papular erythematous rash. Both rashes are associated with pruritus.     Given the history, it sounds unlikely that the rash could be attributed to blood thinners. Unfortunately, we do not have a good allergy test with known positive and negative predictive values for any blood thinners, so I cannot offer any type of allergy testing.     Will treat for urticaria with high doses of antihistamines - would recommend cetirizine or loratadine starting at 20mg per day and can increase to 40mg per day if tolerated. Patient does not want to swallow pills, so I will send loratadine ready-tabs to the pharmacy instead.     Regarding the non-urticarial rash, I reviewed how systemic steroids are not safe to be used long term. I recommend aggressive use of topical steroids and emollient, and I recommend stop using dryer sheets, continue " free and clear soaps and detergents.     We discussed testing to aeroallergens, but doubt this is caused by aeroallergens and testing won't  at this time, so it was deferred.     Long list of medication allergies - did not have time to address at today's visit.     Follow up: 2 weeks for extended visit    Ignacia Mclaughlin MD  Allergy/Immunology

## 2020-06-11 NOTE — PROGRESS NOTES
"  ALLERGY & IMMUNOLOGY CLINIC - [facility] CONSULTATION     HISTORY OF PRESENT ILLNESS     Patient ID: Ayla Dela Cruz is a 75 y.o. female    CC: query medication allergy    HPI: 74 yo woman presents with her  for initial evaluation. She has a history of sensitive skin. She has had a history of rash for several years. Sometimes the rash looks like welts, sometimes rash looks like a "regular rash" and not welts. Welts are described as mosquito-bite looking reactions that come and go. "Regular rash" is described as areas of bumpy red skin. All rashes itch. Heat and compression (bra straps, waist bands) make the rashes worse. The patient and her  are concerned that the rash is due to blood thinners. She has switched blood thinners multiple times: Xarelto x 3 years, then eliquis, savaysa, pradaxa. Pradaxa started 3/2020. She is still taking pradaxa now. Ayla is requesting allergy testing to blood thinners to see whether she is allergic.     She is on blood thinners because she has had a stroke at age 68. She was off blood thinners once for 7 days for a colonoscopy and developed a renal clot and renal infarction. She does not recall whether she had the rash that week.     There are periods of time when she is on blood thinners and does not get any rashes. When she does get a rash, prednisone has helped. She has had one 5 day course of prednisone in the past 6 months. She has also been prescribed topical steroids, but has been less satisfied with those.  Sees Dr. Rachel Raynaud in [place] for Dermatology, who has prescribed halobetasol cream (Group 1 of topical steroid potency) and triamcinolone cream 0.025%.      She still uses dryer sheets, but has stopped using fabric softener. She uses cetaphil soap and am-lactin lotion.      REVIEW OF SYSTEMS     CONST: no F/C/NS   PSYCH: +depression  EYES: wears glasses, no discharge, no pruritus   EARS: no hearing loss, no sensation of fullness  NOSE: no " "congestion, no rhinorrhea, no itching, no sneezing  PULM: history of COPD, no SOB, no wheezing, no cough  CV: no CP, no palpitations, no leg swelling  MSK: no joint swelling, no joint redness  DERM: see HPI     MEDICAL HISTORY     MedHx: active problems reviewed     PHYSICAL EXAM     VS: Temp 98.1 °F (36.7 °C)   Ht 5' 7" (1.702 m)   Wt 54.6 kg (120 lb 5.2 oz)   BMI 18.85 kg/m²   GENERAL: alert, NAD, well-appearing, cooperative  EYES:  EOMI, no conjunctival injection, no discharge, no infraorbital shiners, wears glasses  LUNGS:  no increased WOB, no cough, speaking in complete sentences  HEART: extremities well-perfused  EXTREMITIES: no c/c/e  DERM: erythematous patches on back, no urticaria, no significant papules, +bruises, no skin breaks, no dystrophic fingernails  NEURO: antalgic gait, no facial asymmetry     LABORATORY STUDIES     No eosinophilia, +Elevated total bili     ALLERGEN TESTING     Never done before     ASSESSMENT & PLAN     Ayla Dela Cruz is a 75 y.o. female with     Rash, per history, there are two morphologies - one is consistent with urticaria and the other with a papular erythematous rash. Both rashes are associated with pruritus.     Given the history, it sounds unlikely that the rash could be attributed to blood thinners. Unfortunately, we do not have a good allergy test with known positive and negative predictive values for any blood thinners, so I cannot offer any type of allergy testing.     Will treat for urticaria with high doses of antihistamines - would recommend cetirizine or loratadine starting at 20mg per day and can increase to 40mg per day if tolerated. Patient does not want to swallow pills, so I will send loratadine ready-tabs to the pharmacy instead.     Regarding the non-urticarial rash, I reviewed how systemic steroids are not safe to be used long term. I recommend aggressive use of topical steroids and emollient, and I recommend stop using dryer sheets, continue " free and clear soaps and detergents.     We discussed testing to aeroallergens, but doubt this is caused by aeroallergens and testing won't  at this time, so it was deferred.     Long list of medication allergies - did not have time to address at today's visit.     Follow up: 2 weeks for extended visit    Ignacia Mclaughlin MD  Allergy/Immunology

## 2020-06-15 ENCOUNTER — TELEPHONE (OUTPATIENT)
Dept: PAIN MEDICINE | Facility: CLINIC | Age: 75
End: 2020-06-15

## 2020-06-15 ENCOUNTER — TELEPHONE (OUTPATIENT)
Dept: FAMILY MEDICINE | Facility: CLINIC | Age: 75
End: 2020-06-15

## 2020-06-15 DIAGNOSIS — E80.6 HYPERBILIRUBINEMIA: Primary | ICD-10-CM

## 2020-06-15 NOTE — TELEPHONE ENCOUNTER
I just saw her a few days ago and there was no rash visible at that time.  She subsequently saw allergist.  She did have some intermittent bilirubin elevations on her lab that could result in itching.  This can be aggravated by antihistamines.  She has had her gallbladder removed but biliary problems can cause the itching.  I would recommend that we get an ultrasound of her liver to see if the bile duct looks dilated or abnormal.

## 2020-06-15 NOTE — TELEPHONE ENCOUNTER
----- Message from Gianluca Adkins sent at 6/15/2020 11:43 AM CDT -----  Contact: pt  Type:  Patient Returning Call    Who Called:  pt  Who Left Message for Patient:  Pat  Does the patient know what this is regarding?:  Pt would like Pat to give her a call asap  Best Call Back Number:    Additional Information:  Please follow up

## 2020-06-15 NOTE — TELEPHONE ENCOUNTER
----- Message from Chloé Morales sent at 6/15/2020  9:41 AM CDT -----  Regarding: itching all over current medication not working  Type:  Sooner Apoointment Request    Caller is requesting a sooner appointment.  Caller declined first available appointment listed below.  Caller will not accept being placed on the waitlist and is requesting a message be sent to doctor.    Name of Caller:  pt  When is the first available appointment?  8/16  Symptoms:  itching all over her body  Best Call Back Number:  [phone] (home) [phone] (work)    Additional Information:

## 2020-06-15 NOTE — TELEPHONE ENCOUNTER
Advised pt message from provider. Understanding verbalized. Pt is asking if there's something that can be called in to help relief this itching. Otherwise, she is interested in doing US of liver. No order on file.

## 2020-06-15 NOTE — TELEPHONE ENCOUNTER
Order in for liver ultrasound.  If it is liver related antihistamines can aggravate it.  She can try Aveeno.  Dermatology referral might be helpful if the ultrasound is negative.

## 2020-06-17 ENCOUNTER — HOSPITAL ENCOUNTER (OUTPATIENT)
Dept: RADIOLOGY | Facility: CLINIC | Age: 75
Discharge: HOME | End: 2020-06-17
Attending: FAMILY MEDICINE
Payer: MEDICARE

## 2020-06-17 DIAGNOSIS — E80.6 HYPERBILIRUBINEMIA: ICD-10-CM

## 2020-06-17 PROCEDURE — 76705 ECHO EXAM OF ABDOMEN: CPT | Mod: TC,PO

## 2020-06-17 PROCEDURE — 76705 ECHO EXAM OF ABDOMEN: CPT | Mod: 26,,, | Performed by: RADIOLOGY

## 2020-06-17 PROCEDURE — 76705 US ABDOMEN LIMITED: ICD-10-PCS | Mod: 26,,, | Performed by: RADIOLOGY

## 2020-06-21 ENCOUNTER — LAB VISIT (OUTPATIENT)
Dept: PRIMARY CARE CLINIC | Facility: CLINIC | Age: 75
End: 2020-06-21
Payer: MEDICARE

## 2020-06-21 PROCEDURE — U0003 INFECTIOUS AGENT DETECTION BY NUCLEIC ACID (DNA OR RNA); SEVERE ACUTE RESPIRATORY SYNDROME CORONAVIRUS 2 (SARS-COV-2) (CORONAVIRUS DISEASE [COVID-19]), AMPLIFIED PROBE TECHNIQUE, MAKING USE OF HIGH THROUGHPUT TECHNOLOGIES AS DESCRIBED BY CMS-2020-01-R: HCPCS

## 2020-06-22 ENCOUNTER — TELEPHONE (OUTPATIENT)
Dept: FAMILY MEDICINE | Facility: CLINIC | Age: 75
End: 2020-06-22

## 2020-06-22 NOTE — TELEPHONE ENCOUNTER
Patient was read report. Did not wish to follow up with liver specialist.  She also stated her itching has stopped. She d/c [facility]. (fyi)

## 2020-06-22 NOTE — TELEPHONE ENCOUNTER
----- Message from Jan Olivo MD sent at 6/19/2020 10:04 PM CDT -----  Her ultrasound shows borderline liver enlargement without any sign of fatty liver disease.  Her gallbladder has been removed but the common bile duct is not dilated or scarred and there is no sign of obstruction.  The pancreas and spleen are normal as is the rest of the ultrasound findings.    I suspect she may have Gilbert's Syndrome which can cause some bilirubin elevations and possibly some jaundice.  It is generally a harmless condition.  If she would like to see a liver specialist we can arrange it.    Result sent by e-mail

## 2020-06-23 ENCOUNTER — HOSPITAL ENCOUNTER (OUTPATIENT)
Facility: AMBULARY SURGERY CENTER | Age: 75
Discharge: HOME | End: 2020-06-23
Attending: ANESTHESIOLOGY | Admitting: ANESTHESIOLOGY
Payer: MEDICARE

## 2020-06-23 DIAGNOSIS — M47.892 OTHER SPONDYLOSIS, CERVICAL REGION: ICD-10-CM

## 2020-06-23 DIAGNOSIS — M47.812 CERVICAL SPONDYLOSIS: Primary | ICD-10-CM

## 2020-06-23 LAB — SARS-COV-2 RNA RESP QL NAA+PROBE: NOT DETECTED

## 2020-06-23 PROCEDURE — 64634 PR DESTROY C/TH FACET JNT ADDL: ICD-10-PCS | Mod: RT,,, | Performed by: ANESTHESIOLOGY

## 2020-06-23 PROCEDURE — 64633 DESTROY CERV/THOR FACET JNT: CPT | Performed by: ANESTHESIOLOGY

## 2020-06-23 PROCEDURE — 64633 PR DESTROY CERV/THOR FACET JNT: ICD-10-PCS | Mod: RT,,, | Performed by: ANESTHESIOLOGY

## 2020-06-23 PROCEDURE — 64634 DESTROY C/TH FACET JNT ADDL: CPT | Performed by: ANESTHESIOLOGY

## 2020-06-23 PROCEDURE — 64634 DESTROY C/TH FACET JNT ADDL: CPT | Mod: RT,,, | Performed by: ANESTHESIOLOGY

## 2020-06-23 PROCEDURE — 64633 DESTROY CERV/THOR FACET JNT: CPT | Mod: RT,,, | Performed by: ANESTHESIOLOGY

## 2020-06-23 RX ORDER — LIDOCAINE HYDROCHLORIDE 20 MG/ML
INJECTION, SOLUTION EPIDURAL; INFILTRATION; INTRACAUDAL; PERINEURAL
Status: DISCONTINUED | OUTPATIENT
Start: 2020-06-23 | End: 2020-06-23 | Stop reason: HOSPADM

## 2020-06-23 RX ORDER — SODIUM CHLORIDE, SODIUM LACTATE, POTASSIUM CHLORIDE, CALCIUM CHLORIDE 600; 310; 30; 20 MG/100ML; MG/100ML; MG/100ML; MG/100ML
INJECTION, SOLUTION INTRAVENOUS CONTINUOUS
Status: DISCONTINUED | OUTPATIENT
Start: 2020-06-23 | End: 2020-06-23 | Stop reason: HOSPADM

## 2020-06-23 RX ORDER — METHYLPREDNISOLONE ACETATE 80 MG/ML
INJECTION, SUSPENSION INTRA-ARTICULAR; INTRALESIONAL; INTRAMUSCULAR; SOFT TISSUE
Status: DISCONTINUED | OUTPATIENT
Start: 2020-06-23 | End: 2020-06-23 | Stop reason: HOSPADM

## 2020-06-23 RX ORDER — MIDAZOLAM HYDROCHLORIDE 2 MG/2ML
INJECTION, SOLUTION INTRAMUSCULAR; INTRAVENOUS
Status: DISCONTINUED | OUTPATIENT
Start: 2020-06-23 | End: 2020-06-23 | Stop reason: HOSPADM

## 2020-06-23 RX ORDER — BUPIVACAINE HYDROCHLORIDE 2.5 MG/ML
INJECTION, SOLUTION EPIDURAL; INFILTRATION; INTRACAUDAL
Status: DISCONTINUED | OUTPATIENT
Start: 2020-06-23 | End: 2020-06-23 | Stop reason: HOSPADM

## 2020-06-23 RX ORDER — FENTANYL CITRATE 50 UG/ML
INJECTION, SOLUTION INTRAMUSCULAR; INTRAVENOUS
Status: DISCONTINUED | OUTPATIENT
Start: 2020-06-23 | End: 2020-06-23 | Stop reason: HOSPADM

## 2020-06-23 RX ORDER — LIDOCAINE HYDROCHLORIDE 10 MG/ML
INJECTION, SOLUTION EPIDURAL; INFILTRATION; INTRACAUDAL; PERINEURAL
Status: DISCONTINUED | OUTPATIENT
Start: 2020-06-23 | End: 2020-06-23 | Stop reason: HOSPADM

## 2020-06-23 RX ADMIN — SODIUM CHLORIDE, SODIUM LACTATE, POTASSIUM CHLORIDE, CALCIUM CHLORIDE: 600; 310; 30; 20 INJECTION, SOLUTION INTRAVENOUS at 09:06

## 2020-06-23 NOTE — DISCHARGE SUMMARY
[facility]  Discharge Note  Short Stay    Admit Date: 6/23/2020    Discharge Date and Time: 6/23/2020    Attending Physician: Galo Clancy MD     Discharge Provider: Galo Clancy    Diagnoses:  Active Hospital Problems    Diagnosis  POA    *Other spondylosis, cervical region [M47.892]  Yes      Resolved Hospital Problems   No resolved problems to display.       Hospital Course: Cervical MB RFA  Discharged Condition: Good    Final Diagnoses:   Active Hospital Problems    Diagnosis  POA    *Other spondylosis, cervical region [M47.892]  Yes      Resolved Hospital Problems   No resolved problems to display.       Disposition: Home or Self Care    Follow up/Patient Instructions:    Medications:  Reconciled Home Medications:      Medication List      CHANGE how you take these medications    rosuvastatin 40 MG Tab  Commonly known as: CRESTOR  TAKE 1 TABLET (40 MG TOTAL) BY MOUTH EVERY EVENING.  What changed: how much to take        CONTINUE taking these medications    ammonium lactate 12 % lotion  Commonly known as: LAC-HYDRIN  Apply topically once daily.     ATROPINE SULFATE (PF) OPHT  Place 1 % into the right eye once daily.     cetirizine 10 MG tablet  Commonly known as: ZYRTEC  Take 1 tablet (10 mg total) by mouth once daily.     COSOPT 22.3-6.8 mg/mL ophthalmic solution  Generic drug: dorzolamide-timolol 2-0.5%  Place 1 drop into both eyes 2 (two) times daily. Twice a day     fluticasone furoate-vilanteroL 200-25 mcg/dose Dsdv diskus inhaler  Commonly known as: BREO ELLIPTA  Inhale 1 puff into the lungs once daily.     fluticasone propionate 50 mcg/actuation nasal spray  Commonly known as: FLONASE  1 spray (50 mcg total) by Each Nare route once daily.     gabapentin 300 MG capsule  Commonly known as: NEURONTIN  TAKE 1 CAPSULE BY MOUTH THREE TIMES A DAY     halobetasol 0.05 % cream  Commonly known as: ULTRAVATE  1 application 2 (two) times daily as needed. Apply to affected area     LORazepam 0.5 MG  tablet  Commonly known as: ATIVAN  Take 0.5 mg by mouth every 12 (twelve) hours as needed for Anxiety. Leticia Enriquez     metoprolol tartrate 50 MG tablet  Commonly known as: LOPRESSOR  TAKE 1 TABLET (50 MG TOTAL) BY MOUTH 2 (TWO) TIMES DAILY.     pantoprazole 40 MG tablet  Commonly known as: PROTONIX  Take 1 tablet (40 mg total) by mouth once daily.     PAZEO 0.7 % Drop  Generic drug: olopatadine  Place into both eyes as needed.     PRADAXA 150 mg Cap  Generic drug: dabigatran etexilate  Take 150 mg by mouth 2 (two) times daily.     pramoxine 1 % Foam  Place rectally 3 (three) times daily as needed.     promethazine 12.5 MG Supp  Commonly known as: PHENERGAN  Place 1 suppository (12.5 mg total) rectally every 6 (six) hours as needed.     VIIBRYD 40 mg Tab tablet  Generic drug: vilazodone  Take 40 mg by mouth once daily. DANIELA Enriquez     VRAYLAR 1.5 mg Cap  Generic drug: cariprazine  Take 1 capsule by mouth every evening. DANIELA Enriquez          Discharge Procedure Orders   Call MD for:  temperature >100.4     Call MD for:  persistent nausea and vomiting or diarrhea     Call MD for:  severe uncontrolled pain     Call MD for:  redness, tenderness, or signs of infection (pain, swelling, redness, odor or green/yellow discharge around incision site)     Call MD for:  difficulty breathing or increased cough     Call MD for:  severe persistent headache        Follow up with MD in 2-3 weeks    Discharge Procedure Orders (must include Diet, Follow-up, Activity):   Discharge Procedure Orders (must include Diet, Follow-up, Activity)   Call MD for:  temperature >100.4     Call MD for:  persistent nausea and vomiting or diarrhea     Call MD for:  severe uncontrolled pain     Call MD for:  redness, tenderness, or signs of infection (pain, swelling, redness, odor or green/yellow discharge around incision site)     Call MD for:  difficulty breathing or increased cough     Call MD for:  severe persistent headache

## 2020-06-23 NOTE — PLAN OF CARE
Stable states ready to go home, gaurav po fluids, denies pain, no new leg weakness, to car per wc with RN to

## 2020-06-24 VITALS
DIASTOLIC BLOOD PRESSURE: 88 MMHG | TEMPERATURE: 98 F | BODY MASS INDEX: 18.68 KG/M2 | SYSTOLIC BLOOD PRESSURE: 172 MMHG | OXYGEN SATURATION: 98 % | WEIGHT: 119 LBS | RESPIRATION RATE: 20 BRPM | HEIGHT: 67 IN | HEART RATE: 64 BPM

## 2020-06-26 ENCOUNTER — PATIENT OUTREACH (OUTPATIENT)
Dept: ADMINISTRATIVE | Facility: OTHER | Age: 75
End: 2020-06-26

## 2020-06-26 NOTE — PROGRESS NOTES
Chart reviewed for overdue RACHEL topics  Updates requested from care everywhere  Health Maintenance updated

## 2020-06-30 ENCOUNTER — EXTERNAL CHRONIC CARE MANAGEMENT (OUTPATIENT)
Dept: PRIMARY CARE CLINIC | Facility: CLINIC | Age: 75
End: 2020-06-30
Payer: MEDICARE

## 2020-06-30 PROCEDURE — G2058 PR CHRON CARE MGMT, EA ADDTL 20 MINS: ICD-10-PCS | Mod: S$GLB,,, | Performed by: FAMILY MEDICINE

## 2020-06-30 PROCEDURE — G2058 CCM ADD 20MIN: HCPCS | Mod: S$GLB,,, | Performed by: FAMILY MEDICINE

## 2020-06-30 PROCEDURE — 99490 CHRNC CARE MGMT STAFF 1ST 20: CPT | Mod: S$GLB,,, | Performed by: FAMILY MEDICINE

## 2020-06-30 PROCEDURE — 99490 PR CHRONIC CARE MGMT, 1ST 20 MIN: ICD-10-PCS | Mod: S$GLB,,, | Performed by: FAMILY MEDICINE

## 2020-07-21 ENCOUNTER — PATIENT OUTREACH (OUTPATIENT)
Dept: ADMINISTRATIVE | Facility: OTHER | Age: 75
End: 2020-07-21

## 2020-07-21 ENCOUNTER — OFFICE VISIT (OUTPATIENT)
Dept: PULMONOLOGY | Facility: CLINIC | Age: 75
End: 2020-07-21
Payer: MEDICARE

## 2020-07-21 VITALS
DIASTOLIC BLOOD PRESSURE: 74 MMHG | BODY MASS INDEX: 19.11 KG/M2 | SYSTOLIC BLOOD PRESSURE: 139 MMHG | HEART RATE: 71 BPM | OXYGEN SATURATION: 99 % | WEIGHT: 122 LBS

## 2020-07-21 DIAGNOSIS — J45.30 MILD PERSISTENT ASTHMA WITHOUT COMPLICATION: Primary | ICD-10-CM

## 2020-07-21 DIAGNOSIS — Z86.718 HISTORY OF DVT (DEEP VEIN THROMBOSIS): ICD-10-CM

## 2020-07-21 DIAGNOSIS — R09.89 CHRONIC SINUS COMPLAINTS: ICD-10-CM

## 2020-07-21 DIAGNOSIS — J45.20 MILD INTERMITTENT ASTHMA WITHOUT COMPLICATION: ICD-10-CM

## 2020-07-21 DIAGNOSIS — R06.09 DOE (DYSPNEA ON EXERTION): ICD-10-CM

## 2020-07-21 PROCEDURE — 1101F PR PT FALLS ASSESS DOC 0-1 FALLS W/OUT INJ PAST YR: ICD-10-PCS | Mod: S$GLB,,, | Performed by: INTERNAL MEDICINE

## 2020-07-21 PROCEDURE — 99215 PR OFFICE/OUTPT VISIT, EST, LEVL V, 40-54 MIN: ICD-10-PCS | Mod: S$GLB,,, | Performed by: INTERNAL MEDICINE

## 2020-07-21 PROCEDURE — 99999 PR PBB SHADOW E&M-EST. PATIENT-LVL IV: ICD-10-PCS | Mod: PBBFAC,,, | Performed by: INTERNAL MEDICINE

## 2020-07-21 PROCEDURE — 1126F PR PAIN SEVERITY QUANTIFIED, NO PAIN PRESENT: ICD-10-PCS | Mod: S$GLB,,, | Performed by: INTERNAL MEDICINE

## 2020-07-21 PROCEDURE — 1126F AMNT PAIN NOTED NONE PRSNT: CPT | Mod: S$GLB,,, | Performed by: INTERNAL MEDICINE

## 2020-07-21 PROCEDURE — 1159F MED LIST DOCD IN RCRD: CPT | Mod: S$GLB,,, | Performed by: INTERNAL MEDICINE

## 2020-07-21 PROCEDURE — 3075F PR MOST RECENT SYSTOLIC BLOOD PRESS GE 130-139MM HG: ICD-10-PCS | Mod: S$GLB,,, | Performed by: INTERNAL MEDICINE

## 2020-07-21 PROCEDURE — 3078F DIAST BP <80 MM HG: CPT | Mod: S$GLB,,, | Performed by: INTERNAL MEDICINE

## 2020-07-21 PROCEDURE — 3078F PR MOST RECENT DIASTOLIC BLOOD PRESSURE < 80 MM HG: ICD-10-PCS | Mod: S$GLB,,, | Performed by: INTERNAL MEDICINE

## 2020-07-21 PROCEDURE — 99215 OFFICE O/P EST HI 40 MIN: CPT | Mod: S$GLB,,, | Performed by: INTERNAL MEDICINE

## 2020-07-21 PROCEDURE — 99999 PR PBB SHADOW E&M-EST. PATIENT-LVL IV: CPT | Mod: PBBFAC,,, | Performed by: INTERNAL MEDICINE

## 2020-07-21 PROCEDURE — 3075F SYST BP GE 130 - 139MM HG: CPT | Mod: S$GLB,,, | Performed by: INTERNAL MEDICINE

## 2020-07-21 PROCEDURE — 1159F PR MEDICATION LIST DOCUMENTED IN MEDICAL RECORD: ICD-10-PCS | Mod: S$GLB,,, | Performed by: INTERNAL MEDICINE

## 2020-07-21 PROCEDURE — 1101F PT FALLS ASSESS-DOCD LE1/YR: CPT | Mod: S$GLB,,, | Performed by: INTERNAL MEDICINE

## 2020-07-21 RX ORDER — BUPROPION HYDROCHLORIDE 100 MG/1
100 TABLET, EXTENDED RELEASE ORAL DAILY
Status: ON HOLD | COMMUNITY
Start: 2020-06-30 | End: 2020-09-11

## 2020-07-21 RX ORDER — LEVALBUTEROL TARTRATE 45 UG/1
1-2 AEROSOL, METERED ORAL EVERY 4 HOURS PRN
Qty: 1 INHALER | Refills: 11 | Status: SHIPPED | OUTPATIENT
Start: 2020-07-21 | End: 2021-02-04 | Stop reason: CLARIF

## 2020-07-21 RX ORDER — FLUTICASONE PROPIONATE 50 MCG
1 SPRAY, SUSPENSION (ML) NASAL DAILY
Qty: 16 G | Refills: 11 | Status: SHIPPED | OUTPATIENT
Start: 2020-07-21 | End: 2021-02-04 | Stop reason: CLARIF

## 2020-07-21 RX ORDER — ONDANSETRON 8 MG/1
8 TABLET, ORALLY DISINTEGRATING ORAL
COMMUNITY
Start: 2020-06-10 | End: 2022-04-26

## 2020-07-21 RX ORDER — FAMOTIDINE 20 MG/1
20 TABLET, FILM COATED ORAL 2 TIMES DAILY PRN
COMMUNITY
Start: 2020-06-10 | End: 2021-02-04 | Stop reason: CLARIF

## 2020-07-21 RX ORDER — FLUTICASONE FUROATE AND VILANTEROL TRIFENATATE 200; 25 UG/1; UG/1
1 POWDER RESPIRATORY (INHALATION) DAILY
Qty: 1 EACH | Refills: 11 | Status: SHIPPED | OUTPATIENT
Start: 2020-07-21 | End: 2021-01-26

## 2020-07-21 RX ORDER — ATROPINE SULFATE 10 MG/ML
1 SOLUTION/ DROPS OPHTHALMIC DAILY
COMMUNITY
Start: 2020-07-08 | End: 2021-02-04 | Stop reason: CLARIF

## 2020-07-21 NOTE — PROGRESS NOTES
Chart was reviewed for overdue [facility] (RACHEL)  topics  Updates were requested from care everywhere  Health Maintenance has been updated

## 2020-07-21 NOTE — PROGRESS NOTES
7/21/2020    Ayla Dela Cruz  Office Note- previously seen by Dr. Bryson    Chief Complaint   Patient presents with    Follow-up     been feeling okay    Medication Refill     nasal spray, breo inhaler       HPI:   07/21/2020- patient is a 75-year-old female with atrial fib, DVT (2014) on pradaxa, kidney infarction, history of stroke, GERD, hyperlipidemia who follows with Dr. Bryson for asthma and chronic sinusitis.  She c/o chronic AGARWAL especially if she has to walk uphill or push baby stroller. She is improved after using breo. She had URI symptoms in 1/2020 and thinks she may have had COVID-19 with emesis, loss of smell and tasted, aching for about 3 wks.  She had antibody test which was negative. Her  was sick as well. He assists w/ history.  She is a never smoker but says she has been diagnosed with COPD. Sinuses doing well, uses flonase intermittently. She follows w/ cardiology, has had 3 ablations in the past and sometimes still has a fib.  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    Mark 15, 2019- breo  With  No rescue use, uses flonase, had syncope with  Ankle  Fx.      Oct 16, 2018 pt having sob.  Pt had asthma as child and outgrew.  Pt has had agarwal 2 yrs, no seasonal variation, no clear nocturnal worsening.  Pt has been on intermittent amiodarone with eval /rx Dr Spaulding.  Pt had a fib resolved.  pft in 2014 with vc 35%.  Pt had 3 ablations. On chr xarelto.  No h/o pe.  No edema.  On aldactone.  Pt had transient right paresis - resolved - tia/stroke.    Pt in er 10/14- no wheezes, place empirically on prednisone 40/d with good response.  With albuterol use once breathing felt nl - 1st within last 1-2 yrs.        The chief complaint problem is new to me    PFSH:  Past Medical History:   Diagnosis Date    Anticoagulant long-term use     Anxiety     Arthritis     Atrial fibrillation     Cancer     skin    CHF (congestive heart failure)     Depression      DVT (deep venous thrombosis)     GERD (gastroesophageal reflux disease)     Glaucoma (increased eye pressure)     Hyperlipidemia     diet controlled    Hypertension     Interstitial lung disease     Localized hives 1/10/2020    Mild persistent asthma without complication 11/12/2018    Pneumonia 1/31/2014    Stroke 6-3-14    Stroke     TIA (transient ischemic attack)     TIA (transient ischemic attack)          Past Surgical History:   Procedure Laterality Date    2 heart ablations      3 in total    bilateral cataracts      CHOLECYSTECTOMY      COLONOSCOPY N/A 1/19/2017    Procedure: COLONOSCOPY and EGD;  Surgeon: Jonathan Schultz MD;  Location: [facility] ENDO;  Service: Endoscopy;  Laterality: N/A;    COLONOSCOPY N/A 10/10/2019    Procedure: COLONOSCOPY;  Surgeon: Alex Christian MD;  Location: [facility] ENDO;  Service: Endoscopy;  Laterality: N/A;    ESOPHAGOGASTRODUODENOSCOPY N/A 10/14/2019    Procedure: EGD (ESOPHAGOGASTRODUODENOSCOPY);  Surgeon: Alex Christian MD;  Location: [facility] ENDO;  Service: Endoscopy;  Laterality: N/A;    INJECTION OF ANESTHETIC AGENT AROUND MEDIAL BRANCH NERVES INNERVATING CERVICAL FACET JOINT Right 6/7/2018    Procedure: BLOCK, NERVE, FACET JOINT, MEDIAL BRANCH, CERVICAL;  Surgeon: Galo Clancy MD;  Location: [facility] OR;  Service: Pain Management;  Laterality: Right;  C4, 5, 6    OPEN REDUCTION AND INTERNAL FIXATION (ORIF) OF INJURY OF ANKLE Right 11/1/2018    Procedure: ORIF, ANKLE;  Surgeon: Wilfredo Aguero MD;  Location: [facility] OR;  Service: Orthopedics;  Laterality: Right;    pyloristenosis      RADIOFREQUENCY ABLATION OF LUMBAR MEDIAL BRANCH NERVE AT SINGLE LEVEL Bilateral 9/21/2018    Procedure: RADIOFREQUENCY ABLATION, NERVE, SPINAL, LUMBAR, MEDIAL BRANCH, 1 LEVEL;  Surgeon: Galo Clancy MD;  Location: [facility] OR;  Service: Pain Management;  Laterality: Bilateral;  L3, 4, 5    RADIOFREQUENCY ABLATION OF LUMBAR MEDIAL BRANCH NERVE AT SINGLE LEVEL Bilateral 2/19/2019     Procedure: Radiofrequency Ablation, Nerve, Spinal, Lumbar, Medial Branch, 1 Level;  Surgeon: Galo Clancy MD;  Location: [facility] OR;  Service: Pain Management;  Laterality: Bilateral;  L3, 4, 5     RADIOFREQUENCY ABLATION OF LUMBAR MEDIAL BRANCH NERVE AT SINGLE LEVEL Bilateral 3/10/2020    Procedure: Radiofrequency Ablation, Nerve, Spinal, Lumbar, Medial Branch, 1 Level;  Surgeon: Galo Clancy MD;  Location: [facility] OR;  Service: Pain Management;  Laterality: Bilateral;  L3,4,5 - Burned at 80 degrees C. for 60 seconds x 2 each site      RADIOFREQUENCY THERMAL COAGULATION OF MEDIAL BRANCH OF POSTERIOR RAMUS OF CERVICAL SPINAL NERVE Right 7/3/2018    Procedure: RADIOFREQUENCY THERMAL COAGULATION, NERVE, SPINAL, CERVICAL, MEDIAL BRANCH OF POSTERIOR RAMUS;  Surgeon: Galo Clancy MD;  Location: [facility] OR;  Service: Pain Management;  Laterality: Right;  C4,5,6 - Burned at 80 degrees C. for 75 seconds each site    RADIOFREQUENCY THERMAL COAGULATION OF MEDIAL BRANCH OF POSTERIOR RAMUS OF CERVICAL SPINAL NERVE Right 7/23/2019    Procedure: RADIOFREQUENCY THERMAL COAGULATION, NERVE, SPINAL, CERVICAL, POSTERIOR RAMUS, MEDIAL BRANCH;  Surgeon: Galo Clancy MD;  Location: [facility] OR;  Service: Pain Management;  Laterality: Right;  C4,5,6    RADIOFREQUENCY THERMAL COAGULATION OF MEDIAL BRANCH OF POSTERIOR RAMUS OF CERVICAL SPINAL NERVE Right 6/23/2020    Procedure: RADIOFREQUENCY THERMAL COAGULATION, NERVE, SPINAL, CERVICAL, POSTERIOR RAMUS, MEDIAL BRANCH;  Surgeon: Galo Clancy MD;  Location: [facility] OR;  Service: Pain Management;  Laterality: Right;  C4, 5, 6    RADIOFREQUENCY THERMOCOAGULATION Bilateral 9/10/2019    Procedure: RADIOFREQUENCY THERMAL COAGULATION LUMBAR;  Surgeon: Galo Clancy MD;  Location: [facility] OR;  Service: Pain Management;  Laterality: Bilateral;  L3,4,5 - Burned at 80 degrees C. for 60 seconds x 2 each site    skin cancer removal       TONSILLECTOMY       Social History     Tobacco Use    Smoking status: Never Smoker     Smokeless tobacco: Never Used   Substance Use Topics    Alcohol use: No     Frequency: Monthly or less     Drinks per session: 1 or 2     Binge frequency: Never    Drug use: No     Family History   Problem Relation Age of Onset    Heart disease Father     Ulcers Father     Arthritis Mother     Asthma Mother     Rheum arthritis Mother     Pneumonia Mother     Depression Son     Alzheimer's disease Maternal Uncle     Rheum arthritis Maternal Grandmother     Emphysema Maternal Grandfather     Cancer Maternal Grandfather         kidney    Kidney disease Maternal Grandfather     Cancer Paternal Grandmother         lung= smoker    Pneumonia Paternal Grandfather     Breast cancer Neg Hx     Ovarian cancer Neg Hx      Review of patient's allergies indicates:   Allergen Reactions    Gabapentin Hallucinations    Xarelto [rivaroxaban] Rash    Bactrim [sulfamethoxazole-trimethoprim] Other (See Comments)     Upset stomach, dry heaves, confusion    Atorvastatin      Other reaction(s): Joint pain    Augmentin [amoxicillin-pot clavulanate]      Other reaction(s): Mental Status Change    Baclofen (bulk) Nausea And Vomiting    Ciprofloxacin (bulk)     Decongest tabs      Other reaction(s): increased heart rate    Erythromycin Other (See Comments)    Flecainide Hives     And SOB. No reaction to Lidocaine     Fluoxetine      Other reaction(s): heart palpitations  Other reaction(s): anxiety    Lisinopril Other (See Comments)     cough    Losartan Other (See Comments)     Hypotension with lightheadedness    Morphine Other (See Comments)     confusion    Tramadol Other (See Comments)     SOB, low BP    Venlafaxine analogues      Changes in BP and increases heart rate       Afrin [oxymetazoline] Palpitations    Caffeine Palpitations    Pradaxa [dabigatran etexilate] Rash    Tizanidine Anxiety     dizziness       Performance Status:The patient's activity level is functions out of house.      Review  of Systems:  a review of eleven systems covering constitutional, Eye, HEENT, Psych, Respiratory, Cardiac, GI, , Musculoskeletal, Endocrine, Dermatologic was negative except for pertinent findings as listed ABOVE and below:  Eyes dry and itchy  Appetite better, wt up a few pounds     Exam:Comprehensive exam done. /74 (BP Location: Left arm, Patient Position: Sitting)   Pulse 71   Wt 55.3 kg (122 lb 0.4 oz)   SpO2 99% Comment: on room air at rest  BMI 19.11 kg/m²   Exam included Vitals as listed, and patient's appearance and affect and alertness and mood, oral exam for yeast and hygiene and pharynx lesions and Mallapatti (M) score, neck with inspection for jvd and masses and thyroid abnormalities and lymph nodes (supraclavicular and infraclavicular nodes and axillary also examined and noted if abn), chest exam included symmetry and effort and fremitus and percussion and auscultation, cardiac exam included rhythm and gallops and murmur and rubs and jvd and edema, abdominal exam for mass and hepatosplenomegaly and tenderness and hernias and bowel sounds, Musculoskeletal exam with muscle tone and posture and mobility/gait and  strength, and skin for rashes and cyanosis and pallor and turgor, extremity for clubbing.  Findings were normal except for pertinent findings listed below:  M1  Posterior oropharynx erythema & cobbling  Thin  Lungs clear  No edema    Radiographs (ct chest and cxr) reviewed: view by direct vision   CXR 1/23/20- cardiomegaly, prominence of interstitial markings, peribronchial cuffing, RLL  infiltrates  CTA chest 2014- mosaicism consistent with air trapping, bibasilar infiltrates, small right pleural effusion, cardiomegaly with enlarged RV    Labs reviewed    Results for EILEEN SWIFT (MRN [id number]) as of 7/21/2020 09:27   Ref. Range 6/11/2020 11:33   Eosinophil% Latest Ref Range: 0.0 - 8.0 % 4.2   Eos # Latest Ref Range: 0.0 - 0.5 K/uL 0.4    Results for SWIFTEILEEN YUEN  ALEX (MRN [id number]) as of 7/21/2020 09:27   Ref. Range 10/14/2019 00:12   Mono # Latest Ref Range: 0.3 - 1.0 K/uL 2.0 (H)   Eosinophil% Latest Ref Range: 0.0 - 8.0 % 0.8       PFT results reviewed 2014- spirometry shows severe restriction.  Lung volumes and DLCO were not performed        Plan:  Clinical impression is apparently straight forward and impression with management as below.     Ayla was seen today for follow-up and medication refill.    Diagnoses and all orders for this visit:    Mild persistent asthma without complication    AVILA (dyspnea on exertion)    History of DVT (deep vein thrombosis)    Chronic sinus complaints  -     fluticasone propionate (FLONASE) 50 mcg/actuation nasal spray; 1 spray (50 mcg total) by Each Nostril route once daily.    Mild intermittent asthma without complication  -     fluticasone furoate-vilanteroL (BREO ELLIPTA) 200-25 mcg/dose DsDv diskus inhaler; Inhale 1 puff into the lungs once daily.  -     levalbuterol (XOPENEX HFA) 45 mcg/actuation inhaler; Inhale 1-2 puffs into the lungs every 4 (four) hours as needed for Wheezing or Shortness of Breath. Rescue        Follow up in about 6 months (around 1/21/2021).    Discussed with patient above for education the following:      Patient Instructions   Continue using breo inhaler  Continue flonase nasal spray  Continue pradaxa  Refilled xopenex if you need it

## 2020-07-21 NOTE — PATIENT INSTRUCTIONS
Continue using breo inhaler  Continue flonase nasal spray  Continue pradaxa  Refilled xopenex if you need it

## 2020-07-31 ENCOUNTER — EXTERNAL CHRONIC CARE MANAGEMENT (OUTPATIENT)
Dept: PRIMARY CARE CLINIC | Facility: CLINIC | Age: 75
End: 2020-07-31
Payer: MEDICARE

## 2020-07-31 PROCEDURE — 99490 PR CHRONIC CARE MGMT, 1ST 20 MIN: ICD-10-PCS | Mod: S$GLB,,, | Performed by: FAMILY MEDICINE

## 2020-07-31 PROCEDURE — 99490 CHRNC CARE MGMT STAFF 1ST 20: CPT | Mod: S$GLB,,, | Performed by: FAMILY MEDICINE

## 2020-08-02 ENCOUNTER — PATIENT OUTREACH (OUTPATIENT)
Dept: ADMINISTRATIVE | Facility: OTHER | Age: 75
End: 2020-08-02

## 2020-08-05 ENCOUNTER — OFFICE VISIT (OUTPATIENT)
Dept: PAIN MEDICINE | Facility: CLINIC | Age: 75
End: 2020-08-05
Payer: MEDICARE

## 2020-08-05 VITALS
SYSTOLIC BLOOD PRESSURE: 129 MMHG | DIASTOLIC BLOOD PRESSURE: 76 MMHG | BODY MASS INDEX: 19.15 KG/M2 | WEIGHT: 122 LBS | HEART RATE: 65 BPM | HEIGHT: 67 IN

## 2020-08-05 DIAGNOSIS — M47.892 OTHER SPONDYLOSIS, CERVICAL REGION: Primary | ICD-10-CM

## 2020-08-05 DIAGNOSIS — M25.511 RIGHT SHOULDER PAIN, UNSPECIFIED CHRONICITY: ICD-10-CM

## 2020-08-05 DIAGNOSIS — M79.10 MYALGIA: ICD-10-CM

## 2020-08-05 DIAGNOSIS — M47.896 OTHER SPONDYLOSIS, LUMBAR REGION: ICD-10-CM

## 2020-08-05 PROCEDURE — 1125F PR PAIN SEVERITY QUANTIFIED, PAIN PRESENT: ICD-10-PCS | Mod: S$GLB,,, | Performed by: PHYSICIAN ASSISTANT

## 2020-08-05 PROCEDURE — 3078F DIAST BP <80 MM HG: CPT | Mod: S$GLB,,, | Performed by: PHYSICIAN ASSISTANT

## 2020-08-05 PROCEDURE — 1100F PR PT FALLS ASSESS DOC 2+ FALLS/FALL W/INJURY/YR: ICD-10-PCS | Mod: S$GLB,,, | Performed by: PHYSICIAN ASSISTANT

## 2020-08-05 PROCEDURE — 1159F MED LIST DOCD IN RCRD: CPT | Mod: S$GLB,,, | Performed by: PHYSICIAN ASSISTANT

## 2020-08-05 PROCEDURE — 3288F PR FALLS RISK ASSESSMENT DOCUMENTED: ICD-10-PCS | Mod: S$GLB,,, | Performed by: PHYSICIAN ASSISTANT

## 2020-08-05 PROCEDURE — 1125F AMNT PAIN NOTED PAIN PRSNT: CPT | Mod: S$GLB,,, | Performed by: PHYSICIAN ASSISTANT

## 2020-08-05 PROCEDURE — 99214 PR OFFICE/OUTPT VISIT, EST, LEVL IV, 30-39 MIN: ICD-10-PCS | Mod: S$GLB,,, | Performed by: PHYSICIAN ASSISTANT

## 2020-08-05 PROCEDURE — 3074F PR MOST RECENT SYSTOLIC BLOOD PRESSURE < 130 MM HG: ICD-10-PCS | Mod: S$GLB,,, | Performed by: PHYSICIAN ASSISTANT

## 2020-08-05 PROCEDURE — 3074F SYST BP LT 130 MM HG: CPT | Mod: S$GLB,,, | Performed by: PHYSICIAN ASSISTANT

## 2020-08-05 PROCEDURE — 1100F PTFALLS ASSESS-DOCD GE2>/YR: CPT | Mod: S$GLB,,, | Performed by: PHYSICIAN ASSISTANT

## 2020-08-05 PROCEDURE — 99999 PR PBB SHADOW E&M-EST. PATIENT-LVL IV: ICD-10-PCS | Mod: PBBFAC,,, | Performed by: PHYSICIAN ASSISTANT

## 2020-08-05 PROCEDURE — 3288F FALL RISK ASSESSMENT DOCD: CPT | Mod: S$GLB,,, | Performed by: PHYSICIAN ASSISTANT

## 2020-08-05 PROCEDURE — 99999 PR PBB SHADOW E&M-EST. PATIENT-LVL IV: CPT | Mod: PBBFAC,,, | Performed by: PHYSICIAN ASSISTANT

## 2020-08-05 PROCEDURE — 3078F PR MOST RECENT DIASTOLIC BLOOD PRESSURE < 80 MM HG: ICD-10-PCS | Mod: S$GLB,,, | Performed by: PHYSICIAN ASSISTANT

## 2020-08-05 PROCEDURE — 1159F PR MEDICATION LIST DOCUMENTED IN MEDICAL RECORD: ICD-10-PCS | Mod: S$GLB,,, | Performed by: PHYSICIAN ASSISTANT

## 2020-08-05 PROCEDURE — 99214 OFFICE O/P EST MOD 30 MIN: CPT | Mod: S$GLB,,, | Performed by: PHYSICIAN ASSISTANT

## 2020-08-05 NOTE — PROGRESS NOTES
Referring Physician: No ref. provider found    PCP: Jan Olivo MD    CC:  Lower back pain    Interval History:  Mrs. Dela Cruz is a 75 y.o. female  with  Chronic low back pain who presents today for f/u s/p Cervical RFA. Reports >80% relief. She is s/p lumbar MB RFA at L3, 4, 5 bilaterally in March that provided good relief of low back pain. C/o some pain higher up today in right lumbar thoracic region. .She had a right shoulder injection last year that provided minimal benefit.  She is scheduled for repeat cervical MB RFA in the near future.  She denies any worsening weakness.  No bowel bladder changes. She does not have a home exercises plan but she wants to start going to the gym. Pain today is rated 2/10.    Prior HPI:   Ayla Dela Cruz is a 75 y.o. female who presents as a new patient from Dr. Grubbs for 2.5 month history of R shoulder/mid back pain. The pain first began when she lifted some books at a bookstore in early November. She began feeling the pain in her R lateral neck/suprascapular region, with radiation down the back/side of her arm to the top of the elbow. She was treated with some topical creams, chiropractor adjustments, and then was placed on gabapentin by Dr. Grubbs, which has helped her pain a considerable amount. She states that the pain is intermittent, aching, sharp, shooting & radiates to her elbow. Worsened by sitting, or lying in bed. 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     Interventional history:  Cervical epidural steroid injection on 01/2018 and 04/2018 with moderate benefit of her neck and right shoulder pain.  - Cervical right C 4-6 on 07/2018 with 60% relief of her right-sided neck pain.  - lumbar MB RFA on 09/2018 with 60% relief of her lower back pain    ROS:  CONSTITUTIONAL: No fevers, chills, night sweats, wt. loss, appetite changes  SKIN: no rashes or itching  ENT: No headaches, head trauma, vision changes, or eye pain  LYMPH NODES: None noticed   CV: No chest  pain, palpitations.   RESP: No shortness of breath, dyspnea on exertion, cough, wheezing, or hemoptysis  GI: No nausea, emesis, diarrhea, constipation, melena, hematochezia, pain.    : No dysuria, hematuria, urgency, or frequency   HEME: No easy bruising, bleeding problems  PSYCHIATRIC: No anxiety, psychosis, hallucinations. +ve depression  NEURO: No seizures, memory loss, dizziness, +ve difficulty sleeping  MSK: +HPI      Past Medical History:   Diagnosis Date    Anticoagulant long-term use     Anxiety     Arthritis     Atrial fibrillation     Cancer     skin    CHF (congestive heart failure)     Depression     DVT (deep venous thrombosis)     GERD (gastroesophageal reflux disease)     Glaucoma (increased eye pressure)     Hyperlipidemia     diet controlled    Hypertension     Interstitial lung disease     Localized hives 1/10/2020    Mild persistent asthma without complication 11/12/2018    Pneumonia 1/31/2014    Stroke 6-3-14    Stroke     TIA (transient ischemic attack)     TIA (transient ischemic attack)      Past Surgical History:   Procedure Laterality Date    2 heart ablations      3 in total    bilateral cataracts      CHOLECYSTECTOMY      COLONOSCOPY N/A 1/19/2017    Procedure: COLONOSCOPY and EGD;  Surgeon: Jonathan Schultz MD;  Location: [facility];  Service: Endoscopy;  Laterality: N/A;    COLONOSCOPY N/A 10/10/2019    Procedure: COLONOSCOPY;  Surgeon: Alex Christian MD;  Location: [facility] ENDO;  Service: Endoscopy;  Laterality: N/A;    ESOPHAGOGASTRODUODENOSCOPY N/A 10/14/2019    Procedure: EGD (ESOPHAGOGASTRODUODENOSCOPY);  Surgeon: Alex Christian MD;  Location: [facility] ENDO;  Service: Endoscopy;  Laterality: N/A;    INJECTION OF ANESTHETIC AGENT AROUND MEDIAL BRANCH NERVES INNERVATING CERVICAL FACET JOINT Right 6/7/2018    Procedure: BLOCK, NERVE, FACET JOINT, MEDIAL BRANCH, CERVICAL;  Surgeon: Galo Clancy MD;  Location: [facility] OR;  Service: Pain Management;  Laterality:  Right;  C4, 5, 6    OPEN REDUCTION AND INTERNAL FIXATION (ORIF) OF INJURY OF ANKLE Right 11/1/2018    Procedure: ORIF, ANKLE;  Surgeon: Wilfredo Aguero MD;  Location: [facility] OR;  Service: Orthopedics;  Laterality: Right;    pyloristenosis      RADIOFREQUENCY ABLATION OF LUMBAR MEDIAL BRANCH NERVE AT SINGLE LEVEL Bilateral 9/21/2018    Procedure: RADIOFREQUENCY ABLATION, NERVE, SPINAL, LUMBAR, MEDIAL BRANCH, 1 LEVEL;  Surgeon: Galo Clancy MD;  Location: [facility] OR;  Service: Pain Management;  Laterality: Bilateral;  L3, 4, 5    RADIOFREQUENCY ABLATION OF LUMBAR MEDIAL BRANCH NERVE AT SINGLE LEVEL Bilateral 2/19/2019    Procedure: Radiofrequency Ablation, Nerve, Spinal, Lumbar, Medial Branch, 1 Level;  Surgeon: Galo Clancy MD;  Location: [facility] OR;  Service: Pain Management;  Laterality: Bilateral;  L3, 4, 5     RADIOFREQUENCY ABLATION OF LUMBAR MEDIAL BRANCH NERVE AT SINGLE LEVEL Bilateral 3/10/2020    Procedure: Radiofrequency Ablation, Nerve, Spinal, Lumbar, Medial Branch, 1 Level;  Surgeon: Galo Clancy MD;  Location: [facility] OR;  Service: Pain Management;  Laterality: Bilateral;  L3,4,5 - Burned at 80 degrees C. for 60 seconds x 2 each site      RADIOFREQUENCY THERMAL COAGULATION OF MEDIAL BRANCH OF POSTERIOR RAMUS OF CERVICAL SPINAL NERVE Right 7/3/2018    Procedure: RADIOFREQUENCY THERMAL COAGULATION, NERVE, SPINAL, CERVICAL, MEDIAL BRANCH OF POSTERIOR RAMUS;  Surgeon: Galo Clancy MD;  Location: [facility] OR;  Service: Pain Management;  Laterality: Right;  C4,5,6 - Burned at 80 degrees C. for 75 seconds each site    RADIOFREQUENCY THERMAL COAGULATION OF MEDIAL BRANCH OF POSTERIOR RAMUS OF CERVICAL SPINAL NERVE Right 7/23/2019    Procedure: RADIOFREQUENCY THERMAL COAGULATION, NERVE, SPINAL, CERVICAL, POSTERIOR RAMUS, MEDIAL BRANCH;  Surgeon: Galo Clancy MD;  [facility];  Service: Pain Management;  Laterality: Right;  C4,5,6    RADIOFREQUENCY THERMAL COAGULATION OF MEDIAL BRANCH OF POSTERIOR RAMUS OF CERVICAL  SPINAL NERVE Right 6/23/2020    Procedure: RADIOFREQUENCY THERMAL COAGULATION, NERVE, SPINAL, CERVICAL, POSTERIOR RAMUS, MEDIAL BRANCH;  Surgeon: Galo Clancy MD;  Location: [facility] OR;  Service: Pain Management;  Laterality: Right;  C4, 5, 6    RADIOFREQUENCY THERMOCOAGULATION Bilateral 9/10/2019    Procedure: RADIOFREQUENCY THERMAL COAGULATION LUMBAR;  Surgeon: Galo Clancy MD;  Location: [facility] OR;  Service: Pain Management;  Laterality: Bilateral;  L3,4,5 - Burned at 80 degrees C. for 60 seconds x 2 each site    skin cancer removal       TONSILLECTOMY       Family History   Problem Relation Age of Onset    Heart disease Father     Ulcers Father     Arthritis Mother     Asthma Mother     Rheum arthritis Mother     Pneumonia Mother     Depression Son     Alzheimer's disease Maternal Uncle     Rheum arthritis Maternal Grandmother     Emphysema Maternal Grandfather     Cancer Maternal Grandfather         kidney    Kidney disease Maternal Grandfather     Cancer Paternal Grandmother         lung= smoker    Pneumonia Paternal Grandfather     Breast cancer Neg Hx     Ovarian cancer Neg Hx      Social History     Socioeconomic History    Marital status:      Spouse name: Not on file    Number of children: Not on file    Years of education: Not on file    Highest education level: Not on file   Occupational History    Not on file   Social Needs    Financial resource strain: Not hard at all    Food insecurity     Worry: Never true     Inability: Never true    Transportation needs     Medical: No     Non-medical: No   Tobacco Use    Smoking status: Never Smoker    Smokeless tobacco: Never Used   Substance and Sexual Activity    Alcohol use: No     Frequency: Monthly or less     Drinks per session: 1 or 2     Binge frequency: Never    Drug use: No    Sexual activity: Yes     Partners: Male   Lifestyle    Physical activity     Days per week: 0 days     Minutes per session: 0 min    Stress: To  "some extent   Relationships    Social connections     Talks on phone: Three times a week     Gets together: Once a week     Attends Protestant service: Not on file     Active member of club or organization: Yes     Attends meetings of clubs or organizations: More than 4 times per year     Relationship status:    Other Topics Concern    Not on file   Social History Narrative    Not on file         Medications/Allergies: See med card    Vitals:    08/05/20 0809   BP: 129/76   Pulse: 65   Weight: 55.3 kg (122 lb)   Height: 5' 7" (1.702 m)   PainSc:   2   PainLoc: Back       Physical exam:    GENERAL: A and O x3, the patient appears well groomed and is in no acute distress.  Skin: No rashes or obvious lesions  HEENT: normocephalic, atraumatic  CARDIOVASCULAR:  RRR  LUNGS: non labored breathing  ABDOMEN: soft, nontender   UPPER EXTREMITIES: Normal alignment, normal range of motion, no atrophy, no skin changes,  hair growth and nail growth normal and equal bilaterally. No swelling. Moderate tenderness to AC joint on the right. Pain with flexion at 90 degrees.   LOWER EXTREMITIES:  Normal alignment, normal range of motion, no atrophy, no skin changes,  hair growth and nail growth normal and equal bilaterally. No swelling, no tenderness.  CERVICAL SPINE:  Cervical spine: ROM is full in flexion, extension and lateral rotation with increased pain on the right side with lateral rotation and extension.   Spurling's maneuver causes no neck pain to either side.  Myofascial exam: tender to palpation along suprascapular muscle and anterior pressure of shoulder.  MENTAL STATUS: normal orientation, speech, language, and fund of knowledge for social situation.  Emotional state appropriate.   LUMBAR SPINE:  Full ROM with pain on flexion  Negative SLR bilaterally  TTP lumbar paraspinous muscles bilaterally.     CRANIAL NERVES:  II:  PERRL bilaterally,   III,IV,VI: EOMI.    V:  Facial sensation equal bilaterally  VII:  Facial " motor function normal.  VIII:  Hearing equal to finger rub bilaterally  IX/X: Gag normal, palate symmetric  XI:  Shoulder shrug equal, head turn equal  XII:  Tongue midline without fasciculations      MOTOR: Tone and bulk: normal bilateral upper and lower Strength: normal   Delt Bi Tri WE WF     R 5 5 5 5 5 5   L 5 5 5 5 5 5     IP ADD ABD Quad TA Gas HAM  R 5 5 5 5 5 5 5  L 5 5 5 5 5 5 5    SENSATION: Light touch and pinprick intact bilaterally  REFLEXES: normal, symmetric, nonbrisk.  Toes down, no clonus. No hoffmans.  GAIT: normal rise, base, steps, and arm swing.        Imaging:  MRI C-spine 12/2017  There is a 2 mm retrolisthesis of C4 on C5 and C6 on C7.  There is reversal of the normal cervical lordosis which could relate to neck muscle spasm.The cervical vertebral bodies show normal signal intensity and height with no indication of acute fracture or pathologic marrow replacement process.    There is degenerative disc desiccation at every cervical level with marginal osteophyte formation and disc space narrowing noted at C3-C4, C4-C5, C5-C6, and to a lesser extent, C6-C7.  There is congenital spinal canal narrowing present.    The cervical cord is normal in signal intensity at all levels with no indication of myelomalacia or cord edema. The incidentally observed soft tissues of the neck show no significant abnormalities.    C2-C3: There is bilateral hypertrophic facet arthropathy.  There is left ligamentum flavum thickening.  No definite acquired central canal or neuroforaminal stenosis.    C3-C4: There is a posterior disc osteophyte complex with a superimposed broad central disc protrusion which flattens the ventral cord.  There is ligamentum flavum thickening, bilateral uncovertebral spurring, and bilateral facet arthropathy, left greater than right.  There is moderate overall central canal stenosis, moderate-severe left neuroforaminal stenosis, and moderate right neuroforaminal stenosis.    C4-C5:  There is a bulging posterior disc osteophyte complex, ligamentum flavum thickening, bilateral uncovertebral spurring, and facet arthropathy, right greater than left.  There is severe bilateral neural foraminal stenosis and moderate overall central canal stenosis.  No abnormal cord signal.  There is flattening of the ventral cord.    C5-C6: There is a posterior disc osteophyte complex and bilateral uncovertebral spurring, right greater than left.  No left neuroforaminal stenosis.  There is mild-moderate right neuroforaminal stenosis and mild-moderate overall central canal stenosis.    C6-C7: There is a 2 mm retrolisthesis of C6 on C7 with uncovering of the intervertebral disc and a superimposed posterior disc osteophyte complex.  There is bilateral uncovertebral spurring, left greater than right.  There is severe left and moderate right neuroforaminal stenosis.  There is ligamentum flavum thickening.  There is moderate central canal stenosis with flattening of the ventral cord.    C7-T1: No central canal or neuroforaminal stenosis. No disc protrusion or extrusion.    Assessment:  Mrs. Dela Cruz is a 72 y.o. female with     1. Other spondylosis, cervical region    2. Other spondylosis, lumbar region    3. Right shoulder pain, unspecified chronicity    4. Myalgia      Plan:  1. Recommend physical therapy and home exercise regimen to improve strength . Home exercises provided  2. Continue Gabapentin 300 mg TID   3. Monitor progress and consider repeat bilateral lumbar MB RFA procedure to help with her lower back pain.  4  Monitor progress from repeat cervical MB RFA procedure.  5. F/u prn

## 2020-08-18 ENCOUNTER — PATIENT MESSAGE (OUTPATIENT)
Dept: PAIN MEDICINE | Facility: CLINIC | Age: 75
End: 2020-08-18

## 2020-08-18 DIAGNOSIS — Z01.818 PREOP TESTING: ICD-10-CM

## 2020-08-18 DIAGNOSIS — M47.896 OTHER SPONDYLOSIS, LUMBAR REGION: Primary | ICD-10-CM

## 2020-08-25 ENCOUNTER — TELEPHONE (OUTPATIENT)
Dept: PAIN MEDICINE | Facility: CLINIC | Age: 75
End: 2020-08-25

## 2020-08-25 NOTE — TELEPHONE ENCOUNTER
----- Message from Ada Nicole sent at 8/25/2020  1:37 PM CDT -----  Type: Needs Medical Advice  Who Called: JUAN ---Teresa  Best Call Back Number call [phone]  Additional Information: calling  in regards to a PA she received from the Dr about an injection request asking for a call back. Thanks.

## 2020-08-26 ENCOUNTER — OFFICE VISIT (OUTPATIENT)
Dept: CARDIOLOGY | Facility: CLINIC | Age: 75
End: 2020-08-26
Payer: MEDICARE

## 2020-08-26 VITALS
HEIGHT: 67 IN | SYSTOLIC BLOOD PRESSURE: 143 MMHG | BODY MASS INDEX: 18.51 KG/M2 | OXYGEN SATURATION: 97 % | WEIGHT: 117.94 LBS | DIASTOLIC BLOOD PRESSURE: 69 MMHG | HEART RATE: 64 BPM

## 2020-08-26 DIAGNOSIS — I10 ESSENTIAL HYPERTENSION: ICD-10-CM

## 2020-08-26 DIAGNOSIS — F41.1 GAD (GENERALIZED ANXIETY DISORDER): ICD-10-CM

## 2020-08-26 DIAGNOSIS — R80.9 MICROALBUMINURIA: ICD-10-CM

## 2020-08-26 DIAGNOSIS — I48.0 PAF (PAROXYSMAL ATRIAL FIBRILLATION): Primary | ICD-10-CM

## 2020-08-26 DIAGNOSIS — Z98.890 S/P ABLATION OF ATRIAL FLUTTER: ICD-10-CM

## 2020-08-26 DIAGNOSIS — Z79.01 ANTICOAGULANT LONG-TERM USE: ICD-10-CM

## 2020-08-26 DIAGNOSIS — G45.9 TIA (TRANSIENT ISCHEMIC ATTACK): ICD-10-CM

## 2020-08-26 DIAGNOSIS — I50.32 CHRONIC DIASTOLIC HEART FAILURE: ICD-10-CM

## 2020-08-26 DIAGNOSIS — N28.0 RENAL INFARCT: ICD-10-CM

## 2020-08-26 DIAGNOSIS — Z86.73 H/O: CVA (CEREBROVASCULAR ACCIDENT): ICD-10-CM

## 2020-08-26 DIAGNOSIS — I11.9 HYPERTENSIVE LEFT VENTRICULAR HYPERTROPHY, WITHOUT HEART FAILURE: ICD-10-CM

## 2020-08-26 DIAGNOSIS — E78.00 PURE HYPERCHOLESTEROLEMIA: ICD-10-CM

## 2020-08-26 DIAGNOSIS — I44.1 SECOND DEGREE AV BLOCK, MOBITZ TYPE I: ICD-10-CM

## 2020-08-26 DIAGNOSIS — D64.9 ANEMIA, UNSPECIFIED TYPE: ICD-10-CM

## 2020-08-26 DIAGNOSIS — Z86.79 S/P ABLATION OF ATRIAL FLUTTER: ICD-10-CM

## 2020-08-26 PROBLEM — R00.2 HEART PALPITATIONS: Status: RESOLVED | Noted: 2018-03-26 | Resolved: 2020-08-26

## 2020-08-26 PROCEDURE — 99999 PR PBB SHADOW E&M-EST. PATIENT-LVL V: CPT | Mod: PBBFAC,,, | Performed by: INTERNAL MEDICINE

## 2020-08-26 PROCEDURE — 93000 EKG 12-LEAD: ICD-10-PCS | Mod: S$GLB,,, | Performed by: INTERNAL MEDICINE

## 2020-08-26 PROCEDURE — 1159F PR MEDICATION LIST DOCUMENTED IN MEDICAL RECORD: ICD-10-PCS | Mod: S$GLB,,, | Performed by: INTERNAL MEDICINE

## 2020-08-26 PROCEDURE — 3078F DIAST BP <80 MM HG: CPT | Mod: S$GLB,,, | Performed by: INTERNAL MEDICINE

## 2020-08-26 PROCEDURE — 93000 ELECTROCARDIOGRAM COMPLETE: CPT | Mod: S$GLB,,, | Performed by: INTERNAL MEDICINE

## 2020-08-26 PROCEDURE — 99214 PR OFFICE/OUTPT VISIT, EST, LEVL IV, 30-39 MIN: ICD-10-PCS | Mod: 25,S$GLB,, | Performed by: INTERNAL MEDICINE

## 2020-08-26 PROCEDURE — 1126F AMNT PAIN NOTED NONE PRSNT: CPT | Mod: S$GLB,,, | Performed by: INTERNAL MEDICINE

## 2020-08-26 PROCEDURE — 3077F PR MOST RECENT SYSTOLIC BLOOD PRESSURE >= 140 MM HG: ICD-10-PCS | Mod: S$GLB,,, | Performed by: INTERNAL MEDICINE

## 2020-08-26 PROCEDURE — 1101F PT FALLS ASSESS-DOCD LE1/YR: CPT | Mod: S$GLB,,, | Performed by: INTERNAL MEDICINE

## 2020-08-26 PROCEDURE — 1101F PR PT FALLS ASSESS DOC 0-1 FALLS W/OUT INJ PAST YR: ICD-10-PCS | Mod: S$GLB,,, | Performed by: INTERNAL MEDICINE

## 2020-08-26 PROCEDURE — 99999 PR PBB SHADOW E&M-EST. PATIENT-LVL V: ICD-10-PCS | Mod: PBBFAC,,, | Performed by: INTERNAL MEDICINE

## 2020-08-26 PROCEDURE — 99214 OFFICE O/P EST MOD 30 MIN: CPT | Mod: 25,S$GLB,, | Performed by: INTERNAL MEDICINE

## 2020-08-26 PROCEDURE — 3077F SYST BP >= 140 MM HG: CPT | Mod: S$GLB,,, | Performed by: INTERNAL MEDICINE

## 2020-08-26 PROCEDURE — 1159F MED LIST DOCD IN RCRD: CPT | Mod: S$GLB,,, | Performed by: INTERNAL MEDICINE

## 2020-08-26 PROCEDURE — 3078F PR MOST RECENT DIASTOLIC BLOOD PRESSURE < 80 MM HG: ICD-10-PCS | Mod: S$GLB,,, | Performed by: INTERNAL MEDICINE

## 2020-08-26 PROCEDURE — 1126F PR PAIN SEVERITY QUANTIFIED, NO PAIN PRESENT: ICD-10-PCS | Mod: S$GLB,,, | Performed by: INTERNAL MEDICINE

## 2020-08-26 NOTE — PROGRESS NOTES
" Patient ID:  Ayla Dela Cruz is a 75 y.o. female who presents for {Misc; evaluation/follow-up:06175::"follow-up"} of 6 month f/u      Health literacy: {DESC; LOW/MEDIUM/HIGH:19082} High  Activities: gym 2 days a week  Nicotine: never  Alcohol: rare wine  Illicit drugs: none   Cardiac symptoms: none  Home BP: yes  Medication compliance: yes  Diet: regular, diabetic, Mediterranean regular  Caffeine: none  Labs:   Last Echo: 2017  Last stress test: 2014  Cardiovascular angiogram:   EC2020  Fundoscopic exam:     EPWORTH SLEEPINESS SCALE 2015   Sitting and reading 0   Watching TV 0   Sitting, inactive in a public place (e.g. a theatre or a meeting) 0   As a passenger in a car for an hour without a break 0   Lying down to rest in the afternoon when circumstances permit 0   Sitting and talking to someone 0   Sitting quietly after a lunch without alcohol 0   In a car, while stopped for a few minutes in traffic 0   Total score 0         HPI    ROS     Objective:    Physical Exam      Assessment:       1. PAF (paroxysmal atrial fibrillation)         Plan:         PAF (paroxysmal atrial fibrillation)  -     EKG 12-lead                  "

## 2020-08-26 NOTE — PROGRESS NOTES
Subjective:    Patient ID:  Ayla Dela Cruz is a 75 y.o. 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 Olivo, see annually, do labs  EP: Dr. Calderon  Orthopedic: Dr. Aguero  Surgeon: Dr. Gonsalez, and Dr. Dc  Rheumatologist: Dr. Pak  Prior cardiologist: Dr. Gibson, last seen over a year  Pulmonary: Dr. White  Psychologist: Nu Fermin, AGUILAR, in [place]  Gyn: Dr. Jordan  GI: Dr. Schultz  Neurologist: Dr. Ramirez  Dermatologist: Dennis Corrigan  Pain: Dr. Clancy, injections to neck and both hips very helpful  Lives with , Jan, here with patient, non-smoker, history of prostate CA,  53 years on 6/24  daughter, Lyric, source of stress  Restarted , now 2-4 times weekly, still enjoys it, playing the flute    Health literacy: high  Vaccinations: up-to-date, need tetanus  Activities: house work, some walking, do not feel limited, have robot vacuums   Nicotine: never  Alcohol: rarely wine  Illicit drugs: no  Cardiac symptoms: none  Home BP: 127/69  Medication compliance: yes  Diet: regular  Caffeine: no  Labs: 1/2019 LDL 90.6 on Crestor 40 mg, normal TSH, CMP (albumin 3.1), normal CBC, Vit. D  10/2019 AST 95, Hgb 11.4  Lab Results   Component Value Date    TSH 0.626 01/29/2019        Lab Results   Component Value Date    HGBA1C 5.9 (H) 01/26/2018       Lab Results   Component Value Date    WBC 8.37 01/24/2020    HGB 11.7 (L) 01/24/2020    HCT 37.0 01/24/2020    MCV 94 01/24/2020     01/24/2020       CMP  Sodium   Date Value Ref Range Status   06/11/2020 141 136 - 145 mmol/L Final     Potassium   Date Value Ref Range Status   06/11/2020 4.4 3.5 - 5.1 mmol/L Final     Chloride   Date Value Ref Range Status   06/11/2020 107 95 - 110 mmol/L Final     CO2   Date Value Ref Range Status   06/11/2020 31  (H) 23 - 29 mmol/L Final     Glucose   Date Value Ref Range Status   06/11/2020 96 70 - 110 mg/dL Final     BUN, Bld   Date Value Ref Range Status   06/11/2020 22 8 - 23 mg/dL Final     Creatinine   Date Value Ref Range Status   06/11/2020 0.9 0.5 - 1.4 mg/dL Final   09/24/2012 0.6 0.2 - 1.4 mg/dl Final     Calcium   Date Value Ref Range Status   06/11/2020 9.6 8.7 - 10.5 mg/dL Final   09/24/2012 9.9 8.6 - 10.2 mg/dl Final     Total Protein   Date Value Ref Range Status   06/11/2020 6.8 6.0 - 8.4 g/dL Final     Albumin   Date Value Ref Range Status   06/11/2020 3.9 3.5 - 5.2 g/dL Final     Total Bilirubin   Date Value Ref Range Status   06/11/2020 1.2 (H) 0.1 - 1.0 mg/dL Final     Comment:     For infants and newborns, interpretation of results should be based  on gestational age, weight and in agreement with clinical  observations.  Premature Infant recommended reference ranges:  Up to 24 hours.............<8.0 mg/dL  Up to 48 hours............<12.0 mg/dL  3-5 days..................<15.0 mg/dL  6-29 days.................<15.0 mg/dL       Alkaline Phosphatase   Date Value Ref Range Status   06/11/2020 62 55 - 135 U/L Final   09/24/2012 63 23 - 119 UNIT/L Final     AST   Date Value Ref Range Status   06/11/2020 19 10 - 40 U/L Final   09/24/2012 26 10 - 30 UNIT/L Final     ALT   Date Value Ref Range Status   06/11/2020 11 10 - 44 U/L Final     Anion Gap   Date Value Ref Range Status   06/11/2020 3 (L) 8 - 16 mmol/L Final   09/24/2012 12 5 - 15 meq/L Final     eGFR if    Date Value Ref Range Status   06/11/2020 >60.0 >60 mL/min/1.73 m^2 Final     eGFR if non    Date Value Ref Range Status   06/11/2020 >60.0 >60 mL/min/1.73 m^2 Final     Comment:     Calculation used to obtain the estimated glomerular filtration  rate (eGFR) is the CKD-EPI equation.        @labrcntip(troponini)@    BNP   Date Value Ref Range Status   10/14/2018 94 0 - 99 pg/mL Final     Comment:     Values of less than  100 pg/ml are consistent with non-CHF populations.   }   Lab Results   Component Value Date    CHOL 142 01/31/2020    CHOL 188 01/29/2019    CHOL 136 01/26/2018     Lab Results   Component Value Date    HDL 42 01/31/2020    HDL 85 (H) 01/29/2019    HDL 53 01/26/2018     Lab Results   Component Value Date    LDLCALC 82.4 01/31/2020    LDLCALC 90.6 01/29/2019    LDLCALC 68.2 01/26/2018     Lab Results   Component Value Date    TRIG 88 01/31/2020    TRIG 62 01/29/2019    TRIG 74 01/26/2018     Lab Results   Component Value Date    CHOLHDL 29.6 01/31/2020    CHOLHDL 45.2 01/29/2019    CHOLHDL 39.0 01/26/2018      Last Echo: 09/07/17 FELICIA  Last stress test: 07/30/14, Lexiscan  Cardiovascular angiogram: none  ECG: NSR, rate 64, 2nd degree AVB, mobitz type I, left axis, pulmonary pattern, QTc 476 msec  Fundoscopic exam: biannually, no retinopathy, being treated for glaucoma.    In 6/2014:  Hospitalized 6/3/2014 for acute right sided weakness and slurred speech  DCS - Ayla Dela Cruz is a 69 y.o. female admitted to the services of hospital medicine via the ER for acute CVA. Presented with difficulty speaking, facial drooping and weakness of the right upper and lower extremities. She missed the time window for tPA. ST/PT/OT as well as cardiology and neurology were consulted. Dr. Jurado of cardiology managed A fib. Patient rapidly improved functioning with PT/OT/ST. Currently her goals with PT are met as she is ambulating over 400 feet independently.  She was not approved for IP rehab and refuses SNF. She will be discharged home with outpatient PT/ST/OT evaluation and treatment.    Neurocardio work up  CT head - Mild involutional changes and findings suggesting mild microvascular ischemic change with no acute intracranial findings    Carotid US - No hemodynamically significant stenosis is noted by velocity criteria involving either internal carotid artery.  A hemodynamically significant stenosis is defined as a stenosis of  greater than 50% of the internal carotid artery vessel lumen    ECHO, 6/4/2014, CONCLUSIONS     1 - Concentric hypertrophy. Wall thickness is mildly increased, with the septum and the posterior wall each measuring 1.1 cm across.    2 - Normal left ventricular systolic function (EF 60-65%).     3 - Mild mitral regurgitation.     4 - Left ventricular diastolic dysfunction.     5 - Pulmonary hypertension. estimated PA systolic pressure is 64 mmHg.    6 - Normal right ventricular systolic function .     7 - Suggestion for increase in LV mass from echo on 3/18/2014..     Echo bubble study also negative. Had episode of PAF during monitoring and convert with restart of Flecainide.   Since DC, improving strength in the right hand, right leg feels normal but tire easier. Speech improving. To receive PT/OT/speech today.  Medications reviewed - will DC ASA for now, and know the effects of the Limbitrol and Ativan, uses prn rarely. Some loss of appetite and taste.    Active problems in hospital  Acute left hemispheric CVA, MRI suggest multiple areas, was not on OAC nor antiplatelet Rx  PAF with high CHADS-VAS score, post ablations and DCCs  HTN with LVH  Interstitial lung disease  Pulmonary hypertension  Hyperlipidemia was not on statin    Since visit of 6/20, problem with peripheral swelling, now taking Lasix daily. Last hospitalization / CMP, 6/3 showed K+ 3.4 with normal renal function. Also noted AVILA along with exertional chest heaviness, on-and-off over past several years. Noted it with walking and have to rest. Can last up to an hour. Max grade 10/10, yesterday during the day.  in hospital. Also quite anxious, stopped Limbitrol due to side effects, managed by Dr. Olivo. Quite sedentary and major decrease in appetite, due to depression. No Psychiatrist. Home BP high 175/97. ECG shows NSR, rate 61, right axis, nonspecific T waves abnormalities, prolong QTc 483 msec. Low anterior forces.    Holter,  "6/30/2014:  PREDOMINANT RHYTHM  1. Sinus rhythm with heart rates varying between 43 and 67 bpm with an average of 55 bpm.     VENTRICULAR ARRHYTHMIAS  1. There were frequent polymorphic PVCs totalling 1388 and averaging 40 per hour.  There was 1 couplet.    2. There were no episodes of ventricular tachycardia.    SUPRA VENTRICULAR ARRHYTHMIAS  1. There were very rare PACs totalling 47 and averaging 1 per hour.     2. There were no episodes of sustained supraventricular tachycardia.    SINUS NODE FUNCTION  1. There was no evidence of high grade SA khai block.     AV CONDUCTION  1. There was no evidence of high grade AV block.     DIARY  1. The diary was returned, but not completed    MISCELLANEOUS  1. This was a tape of adequate length (34 hrs).     Since visit of 7/24, better, reviewed by Dr. Olivo and started antidepressant Lexapro. BMP still showed mild hypokalemia of 3.3, not using Lasix at this time. Still feeling fatigue, anxiety, and request refill for Nexium. Discussed need for GI review on chronic PPI. Lack of appetite.  Lexiscan, 7/30/2014, - Nuclear Quantitative Functional Analysis:   LVEF: 66 % (normal is 55 - 69)  LVED Volume: 50 ml (normal is 60 - 98)  LVES Volume: 17 ml (normal is 20 - 42)    Impression: NORMAL MYOCARDIAL PERFUSION  1. The perfusion scan is free of evidence for myocardial ischemia or injury.   2. There is a mild intensity fixed defect in the anteroseptal wall of the left ventricle, secondary to breast attenuation.   3. Resting wall motion is physiologic.   4. Resting LV function is normal.  (normal is 55 - 69)  5. The ventricular volumes are normal at rest and stress.   6. The extracardiac distribution of radioactivity is normal.     No problem with spironolactone, BP improved, home BP similar to office readings.    Since visit of 8/14, had some distress and home monitor showed HR of 40, felt "bad" and nervous to ED, note reviewed, ECG and final pulse count 57-58. Labs reviewed - " "normal renal function and K+ 3.8. Still taking one tab of K+ daily. Not using Lasix. Explained HR discrepancy may be due to VPCs. Reviewed by Ms. Fermin and Lexapro increased to 20 mg, 2 days ago. Feeling "a lot better". Sleeping better. Still sedentary but ready to be more active. Eating better have lost additional 5 lbs since 8/2014.    Since visit of 9/5, still with speech therapy, noted progressive near syncope with SOB and irregular heart beats. Also noted some ankle swelling over the past 2 weeks. ECG shows marked bradycardia, rate 48, right axis, pulmonary pattern, 1st degree AVB. Wellbutrin 100 mg daily along with Lexapro 20 mg by Ms. Fermin NP. BMP showed K+3.5. To use Lasix PRN    Since visit of 9/25, still with marked AVILA, only able to walk about 30' before having to stop. Bothered by increase palpitations during tapering of BB. No definite lung problem, evaluated this year. ECG shows sinus dewayne, rate 53, VPC, RBBB with suggestion of septal MI. No evidence for anemia - CBC 9/3/2014 was normal. Just started doing some activities with arm and leg exercise for the last 2 weeks, Doing some OT alternating with PT every other day.  CXR, 6/3/2014 - Lungs are clear of infiltrate and costophrenic sulci are sharp.  The cardiomediastinal silhouette appears stable.    PET scan, 4/2014 - 1.A hypermetabolic left pulmonary mass is noted with an SUV of 3.0 maximally.  A neoplastic, infectious, or granulomatous process is on the differential. Clinical correlation is suggested.  Close follow-up for resolution or biopsy would be suggested    2.  Elevated right-sided heart pressures are suspected with a large right atrium    3.  Right-sided pleural effusion    4.  Hypermetabolic thyroid gland asymmetrically on the right.  This may relate to thyroiditis, Graves' disease, or neoplastic process.  Ultrasound may be helpful.    5.  Small hypermetabolic focus in the expected location of the left ovary, a female pelvis ultrasound " "may be helpful for further evaluation.  This could relate to a uterine fibroid that has necrosed or infarcted    Biopsy of lung nodule on 5/1/2014 - negative for CA    CTA, 4/2014 - No evidence of pulmonary thromboembolism.    2.  Enlarged cardiac silhouette and secondary findings of elevated right heart pressures with diffuse mosaic lung pattern and right basilar pleural fluid suggesting cardiac decompensation/heart failure.    3. Unchanged 1 cm nodular density within the left upper lobe which previously demonstrated hypermetabolism by PET/CT and unchanged mediastinal lymphadenopathy.    4.  Subpleural nodular density within the right upper lobe is unchanged when compared with the previous study and could represent an area of remote fibrotic scarring.    5.  Heterogeneous appearance of the spleen, possibly due to the phase of contrast enhancement.  Evolving splenic infarction is not entirely excluded.    6. Suggestion of colonic wall thickening involving the descending colon.  Please correlate for symptoms of colitis.    Since visit of 10/14, rehospitalized for HF on 10/26 to 10/29/2014.  DCS - "Ayla Dela Cruz is a 69 y.o. female admitted to the services of hospital medicine via the ER for acute diastolic heart failure. C/o severe SOB and increase in leg swelling over the past two days. Under the care of Dr. Jurado. Recently was taken off metoprolol. History of paroxsymal atrial fib. Hospitalized here in June in this year for acute CVA. It was at that time, pt started Xarelto. Deficits has resolved. No history of heart failure.     Hospital Course: The patient was admitted with acute CHF biventricular and mild elevation of her troponin's at 0.029 - 0.035 and therefore an anginal equivalent was suspected. The patient also had significant hypertension upon admission and although she was not in atrial fibrillation, her EKG showed a significant first degree AV block and RBBB. Discussion was made as to whether or not " "to decrease the patient flecainide; however due to the risk of her going back into atrial fibrillation this was not done. Upon discharge the patient's lisinopril was increased to 10 mg and aldactone was added."    RHC done HEMODYNAMIC RESULTS:    LVEDP (Pre): 24 mmHg  BP: 166/104    Air rest:  PA: 90/34 (54)  PW:  (24)  RVEDP: 16  RA:  (16)    C. SUMMARY:    1. Diastolic dysfunction.  2. - Kee CO 2.64 L/min  3. - Mean systemic pressure 108.6 mmHG, stage II HTN  4. - SVR 41.16 Wood Units  5. - PVR 11.36 Wood Units  6. - Pulmonary HTN due to left sided heart disease and stage II HTN    D. RECOMMENDATIONS:    1. Routine post-cath care.  2. Cardiac rehab referral.  3. Follow up with Dr. Barrett Jurado.  4. - Aggressive BP lowering and diuresis    Repeat ECHO 11/5/2014 CONCLUSIONS     1 - Concentric remodeling. Septal wall thickness is increased, and posterior wall thickness is mildly increased, with the septum measuring 1.3 cm and the posterior wall measuring 1.1 cm across.    2 - Mildly to moderately depressed left ventricular systolic function (EF 40-45%).     3 - Left ventricular diastolic dysfunction. E/e'(lat) is 16    4 - Right ventricular enlargement with mildly depressed systolic function.     5 - Pulmonary hypertension. estimated PA systolic pressure is 56 mmHg.     6 - Intermediate central venous pressure.     7 - No evidence of intracardiac shunt.     8 - No significant change from Echo of 6/4/2014.    Active problems:  Progressive severe SOB, functional class IV  Diastolic dysfunction, elevated BNP and Troponin  Hypokalemia due to diuretics  Secondary Pulmonary HTN with peripheral edema  HTN  History of CVA 6/3/2014  PAF controlled with Flecanide  Marked bradycardia with BB  On OAC  Hematuria    At home receiving PT, OT and speech Rx twice a week, with HH nurse once a week, no problem with medications. Feeling better, sleeping well. Home /73.    Since visit of 11/14, feeling "much better", concern of " "occasional HTN, home BP /66-99, HR . Lot more active, no problem. Not using walker, no CP, SOB, nor dizziness. Recent BMP - normal renal function and K+ 4.1.    Since visit of 12/19/2014, worry about HTN home readings similar to office up to . Morning reading is higher. No HA nor visual abnormalities. Xanax has helped but afraid to use too much. ECG shows sinus arrhythmia, rate of 65, late transition and LAFB. Also bothered with dry cough with the Lisinopril. And started to have return of arm pains that was attributed to atorvastatin, on Crestor. Discussed options.     Since visit of 1/16/2015, noted frequent nocturia, 8-10 times, taking losartan-HCT at night time. Have stopped using Lasix and spironolactone for past 2 months. More active without much problem. Labs normal renal function, K+ 4.2, BNP now 37, A1C 5.6%.    Since visit of 2/18/2015, improving, no further dizziness, some SOB when "stressed", usually helped with alprazolam, use only prn, about 3-6 times weekly. Compliant with medications. Been off Crestor for 6 weeks with concern of muscle problem. Home BP is fine, similar to office.     Since visit of 4/15, appetite returned, feeling a lot better. Active, walking twice a week for about half an hour. Also do calisthenic about twice a week, no problem. Seeing Dr. Zavaleta for generalized pruritis for past 3 months. Cardiac wise less AVILA. Sleeping well. Labs in 5/2015, normal CBC without eosinophilia, thyroid normal, ferritin level low normal at 21. Off Crestor for couple months due to fear of muscle pain but did not find much difference being off medication. Last Lipid in 11/2014 was good, on daily dose of Crestor. Home /79.    Since visit of 7/2/2015, feeling "great", very active without problem, no more AVILA nor dizziness with standing. Compliant with medications, don't miss. Using Tylenol 500 mg BID for arthritis. Notice easy bruising on Xarelto. Home /10.  Holter - " PREDOMINANT RHYTHM  1. Sinus arrhythmia with heart rates varying between 46 and 110 bpm with an average of 73 bpm.     VENTRICULAR ARRHYTHMIAS  1. There were very rare PVCs recorded totalling 8 and averaging less than 1 per hour.     2. There were no episodes of ventricular tachycardia.    SUPRA VENTRICULAR ARRHYTHMIAS  1. There were very frequent PACs totalling 3054 and averaging 132 per hour.  There were 29 bigeminal cycles.  There were 103 couplets.    2. 3% of complexes.    3. There were no episodes of sustained supraventricular tachycardia.    SINUS NODE FUNCTION  1. There was no evidence of high grade SA khai block.     AV CONDUCTION  1. There was no evidence of high grade AV block.     2. The longest RR interval was 1565 msec.     DIARY  1. The diary was properly completed.     2. There was 1 episode of skipping beats reported. The corresponding rhythm strips revealed the following:             During event 1 the rhythm was sinus rhythm at 75 bpm.     3. There was 1 episode of skipped beat reported. The corresponding rhythm strips revealed the following:             During event 1 the rhythm was sinus arrhythmia at 63 bpm.     MISCELLANEOUS  1. This was a tape of adequate length (23 hrs).     Since visit of 10/15, place on new antidepressant, Venlafaxine 75 mg daily, tried 2 and developed rapid HR to 125 bpm, feels more anxious, now switched to Citalopram. Also noted the daily dose of Lorazepam does not seem adequate. Orthostatic VS is positive with lying 142/73, , standing 120/62, . Been taking spironolactone 25 mg for last 3 days. Does feel thirsty. Labs reviewed A1C 5.8%, CBC, BMP were WNL. Lipid shows LDL 99, non-. Goal preferred is <70 and < 100. No problem with 10 mg of Crestor. Had vacation at [place] [employer] [place], walked all over without problem.    Since visit of 1/18/2016, no new problem. 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 Labs with , non-,  "hsCRP at 2.56.     In 10/2016, had acute GB attack with rupture in 8/2016, then tried on Wellbutrin took only 1-2 tabs and BP up to 201/100 with head tightness. Subsequently back to normal, the last 2.5 days took Losartan bid. Discussed Rx options for HTN. Advised to be more active, regular exercise. Lab reviewed LDL in 8/2016 93.     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 Still with daily palpitations, last up to half hours. Have electronic watch, David Barroso, since 9/2016, suppose harmonize patient with the universe. Feeling better if on during the day. Lot of walking at school, no problem.   Holter in 10/2016 - PREDOMINANT RHYTHM  1. Sinus rhythm with heart rates varying between 52 and 144 bpm with an average of 75 bpm.     2. Paroxysmal atrial fibrillation    VENTRICULAR ARRHYTHMIAS  1. There were occasional mostly monomorphic PVCs totalling 596 and averaging 13 per hour.     2. There were no episodes of ventricular tachycardia.    SUPRA VENTRICULAR ARRHYTHMIAS  1. There were frequent PACs totalling 2982 and averaging 69 per hour.  There were 69 bigeminal cycles.  There were 55 couplets.    2. There were no episodes of sustained supraventricular tachycardia.    3. Paroxysmal atrial fibrillation was noted in the the study.     SINUS NODE FUNCTION  1. There was no evidence of high grade SA khai block.     AV CONDUCTION  1. There was no evidence of high grade AV khai filtering.     2. The longest RR interval was 1725 msec.     DIARY  1. The diary was returned, but not completed    MISCELLANEOUS  1. This was a tape of adequate length (43 hrs).     ECHO CONCLUSIONS     1 - Hyperdynamic left ventricular systolic function (EF 65-70%).     2 - Normal left ventricular diastolic function.     3 - Normal right ventricular systolic function .     4 - Pulmonary hypertension. The estimated PA systolic pressure is 64 mmHg.     5 - Less evidence for LVH from Echo in 11/2014.     In 5/2017, bothered by recurrent " "PAF with AVILA after 10 feet walking. Felt better before on Flecainide 100 mg bid, but Holter continued to show PAF. Attempt up titration, problem with itching, switched to propafenone 150 mg tid, continued with itching and reviewed by allergist, treated with 10 days of cortisone with benefit. No hive and no itching, just don't feel good due to the irregular rhythm. History reviewed, had 2 prior AF ablation, last in [place], [place] in around 2000, recall being told not to do again. Recall seeing an [facility] EP doc but didn't like him. Discussed various options. Will stop antiarrhythmic for now, will be going on a 16 days trip to [place]. Reviewed prior medications, have taken Tenormin, metoprolol tartrate, and short-acting diltiazem in the past without problem. Refer to Dr. Calderon for review. ECG in AF, rate 99, PRWP, LAFB    In 11/2017, post AF ablation, felt good for a few weeks, noted some SOB and had review with Dr. Calderon on 11/1 - 72 year old female with a longstanding history of atrial arrhythmias. Her [id number] Score is 5 (age, female, TIA, HTN) so she should remain on anticoagulation indefinitely. She has failed Flecainide. She is now 6 weeks post-PVI and MA flutter line, and maintaining sinus rhythm on low-dose Amiodarone. Given her intermittent AVILA which started post-ablation, I have stopped Amiodarone which I think is the likely culprit. I instructed her to continue taking Lasix PRN, as well as metoprolol and Xarelto." ECG on 11/1 was in NSR, rate 61, PRWP.  Recommended to stop amiodarone but then started to feel the palpitation daily, felt nervous and at the same time being taper down on the nightly Ativan. Did have some breakthrough anxiety attacks. Also had strained the upper back and right arm / shoulder, requesting some Tramadol, tried Jan's Tramadol with good relief. Understand this is an opoid. Used Excedrin with caffeine and bothered by more palpitations.    In 3/2018, here for recurrent " "palpitations, no inciting factors over the past 6 weeks. Had review with Dr. Calderon in 2/2018 - "72 year old female with a longstanding history of atrial arrhythmias and prior ablations at outside institutions. Her [id number] Score is 5 (age, female, TIA, HTN) so she should remain on anticoagulation indefinitely. She is now 5 months post-PVI and MA flutter line, and maintaining sinus rhythm off Amiodarone. The plan is to continue Xarelto, and to see me again in 6 months." Palpitations appears associated with AVILA, had difficulties walking in house or up a ramp. Started 100 mg of amiodarone last week, daily, feels some benefit. ECG today in Sinus rhythm vs wandering atrial pacemaker with frequent PJC, rate 59. Also taking Losartan in the morning appears more effective.  Holter 11/2017 - PREDOMINANT RHYTHM  1. Sinus arrhythmia with heart rates varying between 39 and 102 bpm with an average of 58 bpm.     VENTRICULAR ARRHYTHMIAS  1. There were occasional PVCs totalling 728 and averaging 15 per hour.  There were 5 bigeminal cycles.     2. There were no episodes of ventricular tachycardia.    SUPRA VENTRICULAR ARRHYTHMIAS  1. There was no supraventricular ectopic activity.    SINUS NODE FUNCTION  1. There was no evidence of high grade SA khai block.     AV CONDUCTION  1. There was no evidence of high grade AV block.     2. The longest RR interval was 1820 msec.     DIARY  1. The diary was not returned    MISCELLANEOUS  1. There were occasional hookup related artifacts. Overall, the study was of good quality.     2. This was a tape of adequate length (48 hrs).     in 5/2018, no new problem, still concern of AVILA, usually with walking, variable as little 10 feet or fine with some long walk, can play flute for an hour but find difficulties in holding a note. Off daily amiodarone, uses it prn for palpitation, find helpful. Labs reviewed from 1/2018, TSH, Vitamin D, LDL 68.2, A1C 5.9%, CMP - normal renal function, K+ 3.6. To " "go to [place] for a month in 8/2018.    In 9/2018, return from a month family reunion trip to [place]. Problem with hypotension with Tramadol and Losartan. Had review with Dr. Calderon on 9/5 "Ayla Dela Cruz is a 73 year old female with a longstanding history of atrial arrhythmias and prior ablations at outside institutions. Her [id number] Score is 5 (age, female, TIA, HTN) so she should remain on anticoagulation indefinitely. She is now 11 months post-PVI and MA flutter line, and maintaining sinus rhythm off Amiodarone. However, she is feeling poorly, with frequent PACs and borderline hypotension. The plan is to stop Losartan, echo in next several weeks, Holter monitor in 6 weeks, and follow-up with me in 8 weeks."  Off both medication on 8/31, no problem now. No heart worry. Denies any CP nor SOB. Still teaching. ECG on 9/5 shows NSR with sinus arrhythmia.    In 3/2019, return for follow up, had syncopal spell with hypotension at [facility] required reconstruction of the right ankle, now in PT, doing well. No heart complication, no AF. LDL 90.6 in 1/2019. 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 No heart worries, no CP nor SOB, much better, breathing improved with daily Breo use. Discussed option with NOAC.    In 9/2019, 6 months review, concern of bruising with Xarelto, recall problem with dark stool with Eliquis. Discussed option.     In 1/2020 return due to allergic reaction to Pradaxa, had to stop Eliquis for similar problems - hives and rash, difficult to sleep. Also problem with embolism when off NOAC for 7 days. Discuss alternatives.    In 3/2020, same problem with Savaysa, noted about an hour of PAF this morning, felt nervous. Troubled by recent viral infection and also pain injection with steroid, which helped the rash temporary. Would like to proceed with mechanical alternative. History include past use of Coumadin for about 2 years, had some difficult with GIB and also required up " to 3 times a week testing.    HPI comments: in 8/2020, here for 6-months review. Troubled more with JOSE with quarantine. Working with therapist. Keeping busy with working on a new book, music also. No heart worries.    ROS    Constitutional: negative for chills, fatigue, fevers, sweats, no further slurred speech, and no dysarthria, some lost of appetite with JOSE, intolerance to heat, bruises easily on NOAC and steroid, weight down 6 lbs since 3/2020.   Eyes: negative for icterus, redness and visual disturbance, have visual halo, no more bleed in the right Iris  Respiratory: negative for asthma, cough have resolved off ACE-I, have dyspnea on exertion, occasional SOB with HR 85 to 100 bpm, have lifelong snoring, [place] score 7 improved down to 6, no hemoptysis, pleurisy/chest pain and wheezing  Cardiovascular: negative for chest pain, chest pressure/discomfort, no further orthostatic dizziness, and no palpitations, no paroxysmal nocturnal dyspnea and syncope  Gastrointestinal: negative for abdominal pain, nausea and vomiting, have GERD  Musculoskeletal:positive for arthralgias, and less right sided muscle weakness with improvement in leg strength, improved bilateral ankle pains - OA and no myalgias  Neurological: negative for coordination problems, dizziness, gait problems, headaches, paresthesia, have some tremors but able to draw and no vertigo  Psych: positive for depression, nervousness, anxiety, less stressed due to 's have improved  Skin - right back pruritic rash.    Objective:    Physical Exam    General appearance: alert, appears stated age, cooperative and no distress, decrease skin turgor.  Head: Normocephalic, without obvious abnormality, atraumatic  Eyes:  conjunctivae/corneas clear. PERRL, EOM's intact.    Neck: no adenopathy, no carotid bruit, no JVD, supple, symmetrical, trachea midline and thyroid not enlarged, symmetric, no tenderness/mass/nodules  Lungs:  clear to auscultation  "bilaterally  Chest wall: no tenderness  Heart: regular rate and rhythm, normal S1, S2 normal, occasional grade 2/6 systolic murmur, click, rub or gallop    Abdomen: soft, non-tender; bowel sounds normal; no masses,  no organomegaly, waist 31" down to 29.5" hip 42"  Extremities: extremities no further weakness of the right upper extremity, atraumatic, no cyanosis and have no edema, minor varicose veins  Pulses: 2+ and symmetric    Assessment:       1. PAF (paroxysmal atrial fibrillation)    2. Second degree AV block, Mobitz type I    3. JOSE (generalized anxiety disorder)    4. Anticoagulant long-term use    5. Chronic diastolic heart failure, 2014    6. H/O: CVA (cerebrovascular accident), June 2014    7. Pure hypercholesterolemia    8. Essential hypertension    9. Hypertensive left ventricular hypertrophy, without heart failure    10. Microalbuminuria    11. Renal infarct, due to embolism off Eliquis for 7 days    12. S/P ablation of atrial flutter    13. TIA (transient ischemic attack), 11/3/2014    14. Anemia, unspecified type         Plan:       Ayla was seen today for follow-up.    Diagnoses and associated orders for this visit:    PAF (paroxysmal atrial fibrillation)  -     EKG 12-lead  -     Ambulatory referral/consult to Cardiac Electrophysiology; Future; Expected date: 09/02/2020    Second degree AV block, Mobitz type I    JOSE (generalized anxiety disorder)    Anticoagulant long-term use  -     Ambulatory referral/consult to Cardiac Electrophysiology; Future; Expected date: 09/02/2020    Chronic diastolic heart failure, 2014    H/O: CVA (cerebrovascular accident), June 2014    Pure hypercholesterolemia    Essential hypertension    Hypertensive left ventricular hypertrophy, without heart failure    Microalbuminuria    Renal infarct, due to embolism off Eliquis for 7 days    S/P ablation of atrial flutter  -     Ambulatory referral/consult to Cardiac Electrophysiology; Future; Expected date: " 09/02/2020    TIA (transient ischemic attack), 11/3/2014    Anemia, unspecified type    - All medical issues reviewed, will need to consider mechanical ablation of STACEY  - CV status stable, off antiarrhythmic, all medications reviewed, patient acknowledge good understanding.  - Recommend using Tylenol as first line pain medications.  - Instruction for Mediterranean diet and heart healthy exercise given.  - Need good exercise program, 4 key elements: 1. Aerobic (walking, swimming, dancing, jogging, biking, etc, 2. Muscle strengthening / resistance exercise, need to do 2-3 times weekly, 3. Stretching daily, good stretch takes a whole  total minute. 4. Balance exercise daily.  - Highly recommend 30 minutes of exercise daily, can have Sunday off, with 2-3 sessions of muscle strengthening weekly. A  would be very helpful.  - Recommend at least biannual cardiovascular evaluation in view of her significant risk factors, patient 's preference    Patient Active Problem List   Diagnosis    Paroxysmal atrial fibrillation, ablations X 3 1995, 1997, 9/2017, CHADS-VAS score 5, HAS-BLED score 5     Hypertension, 1985    Hyperlipidemia, baseline     Glaucoma (increased eye pressure)    Fibroid uterus    Thickened endometrium    Abnormal CT scan, chest    Interstitial lung disease    H/O: CVA (cerebrovascular accident), June 2014    LVH (left ventricular hypertrophy) due to hypertensive disease    Depression    OA (osteoarthritis)    Chronic diastolic heart failure, 2014    Microalbuminuria    Hypoalbuminemia    TIA (transient ischemic attack), 11/3/2014    S/P ablation of atrial flutter    JOSE (generalized anxiety disorder)    Other spondylosis, lumbar region    Cervical spondylosis    Medication intolerance    Mild persistent asthma without complication    Anticoagulant long-term use    GERD (gastroesophageal reflux disease)    Renal infarct, due to embolism off Eliquis for 7 days     Mild intermittent asthma without complication    Transaminitis    Body mass index (BMI) 19.9 or less, adult    Localized hives    Elevated AST (SGOT)    Iron deficiency anemia due to chronic blood loss    Other specified spondylopathies, lumbar region    Recurrent major depressive disorder, in partial remission    Gastroenteritis    Other spondylosis, cervical region    History of DVT (deep vein thrombosis)    Chronic sinus complaints    Second degree AV block, Mobitz type I    Anemia     Greater than 50% was spent in counseling and coordination of care. The above assessment and plan have been discussed at length. Labs and procedure over the last 6 months reviewed. Problem List updated. Asked to bring in all active medications / pills bottles with next visit.

## 2020-08-26 NOTE — H&P (VIEW-ONLY)
Subjective:    Patient ID:  Ayla Dela Cruz is a 75 y.o. 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 Olivo, see annually, do labs  EP: Dr. Calderon  Orthopedic: Dr. Aguero  Surgeon: Dr. Gonsalez, and Dr. Dc  Rheumatologist: Dr. Pak  Prior cardiologist: Dr. Gibson, last seen over a year  Pulmonary: Dr. White  Psychologist: Nu Fermin, AGUILAR, in [place]  Gyn: Dr. Jordan  GI: Dr. Schultz  Neurologist: Dr. Ramirez  Dermatologist: Dennis Corrigan  Pain: Dr. Clancy, injections to neck and both hips very helpful  Lives with , Jan, here with patient, non-smoker, history of prostate CA,  53 years on 6/24  daughter, Lyric, source of stress  Restarted , now 2-4 times weekly, still enjoys it, playing the flute    Health literacy: high  Vaccinations: up-to-date, need tetanus  Activities: house work, some walking, do not feel limited, have robot vacuums   Nicotine: never  Alcohol: rarely wine  Illicit drugs: no  Cardiac symptoms: none  Home BP: 127/69  Medication compliance: yes  Diet: regular  Caffeine: no  Labs: 1/2019 LDL 90.6 on Crestor 40 mg, normal TSH, CMP (albumin 3.1), normal CBC, Vit. D  10/2019 AST 95, Hgb 11.4  Lab Results   Component Value Date    TSH 0.626 01/29/2019        Lab Results   Component Value Date    HGBA1C 5.9 (H) 01/26/2018       Lab Results   Component Value Date    WBC 8.37 01/24/2020    HGB 11.7 (L) 01/24/2020    HCT 37.0 01/24/2020    MCV 94 01/24/2020     01/24/2020       CMP  Sodium   Date Value Ref Range Status   06/11/2020 141 136 - 145 mmol/L Final     Potassium   Date Value Ref Range Status   06/11/2020 4.4 3.5 - 5.1 mmol/L Final     Chloride   Date Value Ref Range Status   06/11/2020 107 95 - 110 mmol/L Final     CO2   Date Value Ref Range Status   06/11/2020 31  (H) 23 - 29 mmol/L Final     Glucose   Date Value Ref Range Status   06/11/2020 96 70 - 110 mg/dL Final     BUN, Bld   Date Value Ref Range Status   06/11/2020 22 8 - 23 mg/dL Final     Creatinine   Date Value Ref Range Status   06/11/2020 0.9 0.5 - 1.4 mg/dL Final   09/24/2012 0.6 0.2 - 1.4 mg/dl Final     Calcium   Date Value Ref Range Status   06/11/2020 9.6 8.7 - 10.5 mg/dL Final   09/24/2012 9.9 8.6 - 10.2 mg/dl Final     Total Protein   Date Value Ref Range Status   06/11/2020 6.8 6.0 - 8.4 g/dL Final     Albumin   Date Value Ref Range Status   06/11/2020 3.9 3.5 - 5.2 g/dL Final     Total Bilirubin   Date Value Ref Range Status   06/11/2020 1.2 (H) 0.1 - 1.0 mg/dL Final     Comment:     For infants and newborns, interpretation of results should be based  on gestational age, weight and in agreement with clinical  observations.  Premature Infant recommended reference ranges:  Up to 24 hours.............<8.0 mg/dL  Up to 48 hours............<12.0 mg/dL  3-5 days..................<15.0 mg/dL  6-29 days.................<15.0 mg/dL       Alkaline Phosphatase   Date Value Ref Range Status   06/11/2020 62 55 - 135 U/L Final   09/24/2012 63 23 - 119 UNIT/L Final     AST   Date Value Ref Range Status   06/11/2020 19 10 - 40 U/L Final   09/24/2012 26 10 - 30 UNIT/L Final     ALT   Date Value Ref Range Status   06/11/2020 11 10 - 44 U/L Final     Anion Gap   Date Value Ref Range Status   06/11/2020 3 (L) 8 - 16 mmol/L Final   09/24/2012 12 5 - 15 meq/L Final     eGFR if    Date Value Ref Range Status   06/11/2020 >60.0 >60 mL/min/1.73 m^2 Final     eGFR if non    Date Value Ref Range Status   06/11/2020 >60.0 >60 mL/min/1.73 m^2 Final     Comment:     Calculation used to obtain the estimated glomerular filtration  rate (eGFR) is the CKD-EPI equation.        @labrcntip(troponini)@    BNP   Date Value Ref Range Status   10/14/2018 94 0 - 99 pg/mL Final     Comment:     Values of less than  100 pg/ml are consistent with non-CHF populations.   }   Lab Results   Component Value Date    CHOL 142 01/31/2020    CHOL 188 01/29/2019    CHOL 136 01/26/2018     Lab Results   Component Value Date    HDL 42 01/31/2020    HDL 85 (H) 01/29/2019    HDL 53 01/26/2018     Lab Results   Component Value Date    LDLCALC 82.4 01/31/2020    LDLCALC 90.6 01/29/2019    LDLCALC 68.2 01/26/2018     Lab Results   Component Value Date    TRIG 88 01/31/2020    TRIG 62 01/29/2019    TRIG 74 01/26/2018     Lab Results   Component Value Date    CHOLHDL 29.6 01/31/2020    CHOLHDL 45.2 01/29/2019    CHOLHDL 39.0 01/26/2018      Last Echo: 09/07/17 FELICIA  Last stress test: 07/30/14, Lexiscan  Cardiovascular angiogram: none  ECG: NSR, rate 64, 2nd degree AVB, mobitz type I, left axis, pulmonary pattern, QTc 476 msec  Fundoscopic exam: biannually, no retinopathy, being treated for glaucoma.    In 6/2014:  Hospitalized 6/3/2014 for acute right sided weakness and slurred speech  DCS - Ayla Dela Cruz is a 69 y.o. female admitted to the services of hospital medicine via the ER for acute CVA. Presented with difficulty speaking, facial drooping and weakness of the right upper and lower extremities. She missed the time window for tPA. ST/PT/OT as well as cardiology and neurology were consulted. Dr. Jurado of cardiology managed A fib. Patient rapidly improved functioning with PT/OT/ST. Currently her goals with PT are met as she is ambulating over 400 feet independently.  She was not approved for IP rehab and refuses SNF. She will be discharged home with outpatient PT/ST/OT evaluation and treatment.    Neurocardio work up  CT head - Mild involutional changes and findings suggesting mild microvascular ischemic change with no acute intracranial findings    Carotid US - No hemodynamically significant stenosis is noted by velocity criteria involving either internal carotid artery.  A hemodynamically significant stenosis is defined as a stenosis of  greater than 50% of the internal carotid artery vessel lumen    ECHO, 6/4/2014, CONCLUSIONS     1 - Concentric hypertrophy. Wall thickness is mildly increased, with the septum and the posterior wall each measuring 1.1 cm across.    2 - Normal left ventricular systolic function (EF 60-65%).     3 - Mild mitral regurgitation.     4 - Left ventricular diastolic dysfunction.     5 - Pulmonary hypertension. estimated PA systolic pressure is 64 mmHg.    6 - Normal right ventricular systolic function .     7 - Suggestion for increase in LV mass from echo on 3/18/2014..     Echo bubble study also negative. Had episode of PAF during monitoring and convert with restart of Flecainide.   Since DC, improving strength in the right hand, right leg feels normal but tire easier. Speech improving. To receive PT/OT/speech today.  Medications reviewed - will DC ASA for now, and know the effects of the Limbitrol and Ativan, uses prn rarely. Some loss of appetite and taste.    Active problems in hospital  Acute left hemispheric CVA, MRI suggest multiple areas, was not on OAC nor antiplatelet Rx  PAF with high CHADS-VAS score, post ablations and DCCs  HTN with LVH  Interstitial lung disease  Pulmonary hypertension  Hyperlipidemia was not on statin    Since visit of 6/20, problem with peripheral swelling, now taking Lasix daily. Last hospitalization / CMP, 6/3 showed K+ 3.4 with normal renal function. Also noted AVILA along with exertional chest heaviness, on-and-off over past several years. Noted it with walking and have to rest. Can last up to an hour. Max grade 10/10, yesterday during the day.  in hospital. Also quite anxious, stopped Limbitrol due to side effects, managed by Dr. Olivo. Quite sedentary and major decrease in appetite, due to depression. No Psychiatrist. Home BP high 175/97. ECG shows NSR, rate 61, right axis, nonspecific T waves abnormalities, prolong QTc 483 msec. Low anterior forces.    Holter,  "6/30/2014:  PREDOMINANT RHYTHM  1. Sinus rhythm with heart rates varying between 43 and 67 bpm with an average of 55 bpm.     VENTRICULAR ARRHYTHMIAS  1. There were frequent polymorphic PVCs totalling 1388 and averaging 40 per hour.  There was 1 couplet.    2. There were no episodes of ventricular tachycardia.    SUPRA VENTRICULAR ARRHYTHMIAS  1. There were very rare PACs totalling 47 and averaging 1 per hour.     2. There were no episodes of sustained supraventricular tachycardia.    SINUS NODE FUNCTION  1. There was no evidence of high grade SA khai block.     AV CONDUCTION  1. There was no evidence of high grade AV block.     DIARY  1. The diary was returned, but not completed    MISCELLANEOUS  1. This was a tape of adequate length (34 hrs).     Since visit of 7/24, better, reviewed by Dr. Olivo and started antidepressant Lexapro. BMP still showed mild hypokalemia of 3.3, not using Lasix at this time. Still feeling fatigue, anxiety, and request refill for Nexium. Discussed need for GI review on chronic PPI. Lack of appetite.  Lexiscan, 7/30/2014, - Nuclear Quantitative Functional Analysis:   LVEF: 66 % (normal is 55 - 69)  LVED Volume: 50 ml (normal is 60 - 98)  LVES Volume: 17 ml (normal is 20 - 42)    Impression: NORMAL MYOCARDIAL PERFUSION  1. The perfusion scan is free of evidence for myocardial ischemia or injury.   2. There is a mild intensity fixed defect in the anteroseptal wall of the left ventricle, secondary to breast attenuation.   3. Resting wall motion is physiologic.   4. Resting LV function is normal.  (normal is 55 - 69)  5. The ventricular volumes are normal at rest and stress.   6. The extracardiac distribution of radioactivity is normal.     No problem with spironolactone, BP improved, home BP similar to office readings.    Since visit of 8/14, had some distress and home monitor showed HR of 40, felt "bad" and nervous to ED, note reviewed, ECG and final pulse count 57-58. Labs reviewed - " "normal renal function and K+ 3.8. Still taking one tab of K+ daily. Not using Lasix. Explained HR discrepancy may be due to VPCs. Reviewed by Ms. Fermin and Lexapro increased to 20 mg, 2 days ago. Feeling "a lot better". Sleeping better. Still sedentary but ready to be more active. Eating better have lost additional 5 lbs since 8/2014.    Since visit of 9/5, still with speech therapy, noted progressive near syncope with SOB and irregular heart beats. Also noted some ankle swelling over the past 2 weeks. ECG shows marked bradycardia, rate 48, right axis, pulmonary pattern, 1st degree AVB. Wellbutrin 100 mg daily along with Lexapro 20 mg by Ms. Fermin NP. BMP showed K+3.5. To use Lasix PRN    Since visit of 9/25, still with marked AVILA, only able to walk about 30' before having to stop. Bothered by increase palpitations during tapering of BB. No definite lung problem, evaluated this year. ECG shows sinus dewayne, rate 53, VPC, RBBB with suggestion of septal MI. No evidence for anemia - CBC 9/3/2014 was normal. Just started doing some activities with arm and leg exercise for the last 2 weeks, Doing some OT alternating with PT every other day.  CXR, 6/3/2014 - Lungs are clear of infiltrate and costophrenic sulci are sharp.  The cardiomediastinal silhouette appears stable.    PET scan, 4/2014 - 1.A hypermetabolic left pulmonary mass is noted with an SUV of 3.0 maximally.  A neoplastic, infectious, or granulomatous process is on the differential. Clinical correlation is suggested.  Close follow-up for resolution or biopsy would be suggested    2.  Elevated right-sided heart pressures are suspected with a large right atrium    3.  Right-sided pleural effusion    4.  Hypermetabolic thyroid gland asymmetrically on the right.  This may relate to thyroiditis, Graves' disease, or neoplastic process.  Ultrasound may be helpful.    5.  Small hypermetabolic focus in the expected location of the left ovary, a female pelvis ultrasound " "may be helpful for further evaluation.  This could relate to a uterine fibroid that has necrosed or infarcted    Biopsy of lung nodule on 5/1/2014 - negative for CA    CTA, 4/2014 - No evidence of pulmonary thromboembolism.    2.  Enlarged cardiac silhouette and secondary findings of elevated right heart pressures with diffuse mosaic lung pattern and right basilar pleural fluid suggesting cardiac decompensation/heart failure.    3. Unchanged 1 cm nodular density within the left upper lobe which previously demonstrated hypermetabolism by PET/CT and unchanged mediastinal lymphadenopathy.    4.  Subpleural nodular density within the right upper lobe is unchanged when compared with the previous study and could represent an area of remote fibrotic scarring.    5.  Heterogeneous appearance of the spleen, possibly due to the phase of contrast enhancement.  Evolving splenic infarction is not entirely excluded.    6. Suggestion of colonic wall thickening involving the descending colon.  Please correlate for symptoms of colitis.    Since visit of 10/14, rehospitalized for HF on 10/26 to 10/29/2014.  DCS - "Ayla Dela Cruz is a 69 y.o. female admitted to the services of hospital medicine via the ER for acute diastolic heart failure. C/o severe SOB and increase in leg swelling over the past two days. Under the care of Dr. Jurado. Recently was taken off metoprolol. History of paroxsymal atrial fib. Hospitalized here in June in this year for acute CVA. It was at that time, pt started Xarelto. Deficits has resolved. No history of heart failure.     Hospital Course: The patient was admitted with acute CHF biventricular and mild elevation of her troponin's at 0.029 - 0.035 and therefore an anginal equivalent was suspected. The patient also had significant hypertension upon admission and although she was not in atrial fibrillation, her EKG showed a significant first degree AV block and RBBB. Discussion was made as to whether or not " "to decrease the patient flecainide; however due to the risk of her going back into atrial fibrillation this was not done. Upon discharge the patient's lisinopril was increased to 10 mg and aldactone was added."    RHC done HEMODYNAMIC RESULTS:    LVEDP (Pre): 24 mmHg  BP: 166/104    Air rest:  PA: 90/34 (54)  PW:  (24)  RVEDP: 16  RA:  (16)    C. SUMMARY:    1. Diastolic dysfunction.  2. - Kee CO 2.64 L/min  3. - Mean systemic pressure 108.6 mmHG, stage II HTN  4. - SVR 41.16 Wood Units  5. - PVR 11.36 Wood Units  6. - Pulmonary HTN due to left sided heart disease and stage II HTN    D. RECOMMENDATIONS:    1. Routine post-cath care.  2. Cardiac rehab referral.  3. Follow up with Dr. Barrett Jurado.  4. - Aggressive BP lowering and diuresis    Repeat ECHO 11/5/2014 CONCLUSIONS     1 - Concentric remodeling. Septal wall thickness is increased, and posterior wall thickness is mildly increased, with the septum measuring 1.3 cm and the posterior wall measuring 1.1 cm across.    2 - Mildly to moderately depressed left ventricular systolic function (EF 40-45%).     3 - Left ventricular diastolic dysfunction. E/e'(lat) is 16    4 - Right ventricular enlargement with mildly depressed systolic function.     5 - Pulmonary hypertension. estimated PA systolic pressure is 56 mmHg.     6 - Intermediate central venous pressure.     7 - No evidence of intracardiac shunt.     8 - No significant change from Echo of 6/4/2014.    Active problems:  Progressive severe SOB, functional class IV  Diastolic dysfunction, elevated BNP and Troponin  Hypokalemia due to diuretics  Secondary Pulmonary HTN with peripheral edema  HTN  History of CVA 6/3/2014  PAF controlled with Flecanide  Marked bradycardia with BB  On OAC  Hematuria    At home receiving PT, OT and speech Rx twice a week, with HH nurse once a week, no problem with medications. Feeling better, sleeping well. Home /73.    Since visit of 11/14, feeling "much better", concern of " "occasional HTN, home BP /66-99, HR . Lot more active, no problem. Not using walker, no CP, SOB, nor dizziness. Recent BMP - normal renal function and K+ 4.1.    Since visit of 12/19/2014, worry about HTN home readings similar to office up to . Morning reading is higher. No HA nor visual abnormalities. Xanax has helped but afraid to use too much. ECG shows sinus arrhythmia, rate of 65, late transition and LAFB. Also bothered with dry cough with the Lisinopril. And started to have return of arm pains that was attributed to atorvastatin, on Crestor. Discussed options.     Since visit of 1/16/2015, noted frequent nocturia, 8-10 times, taking losartan-HCT at night time. Have stopped using Lasix and spironolactone for past 2 months. More active without much problem. Labs normal renal function, K+ 4.2, BNP now 37, A1C 5.6%.    Since visit of 2/18/2015, improving, no further dizziness, some SOB when "stressed", usually helped with alprazolam, use only prn, about 3-6 times weekly. Compliant with medications. Been off Crestor for 6 weeks with concern of muscle problem. Home BP is fine, similar to office.     Since visit of 4/15, appetite returned, feeling a lot better. Active, walking twice a week for about half an hour. Also do calisthenic about twice a week, no problem. Seeing Dr. Zavaleta for generalized pruritis for past 3 months. Cardiac wise less AVILA. Sleeping well. Labs in 5/2015, normal CBC without eosinophilia, thyroid normal, ferritin level low normal at 21. Off Crestor for couple months due to fear of muscle pain but did not find much difference being off medication. Last Lipid in 11/2014 was good, on daily dose of Crestor. Home /79.    Since visit of 7/2/2015, feeling "great", very active without problem, no more AVILA nor dizziness with standing. Compliant with medications, don't miss. Using Tylenol 500 mg BID for arthritis. Notice easy bruising on Xarelto. Home /10.  Holter - " PREDOMINANT RHYTHM  1. Sinus arrhythmia with heart rates varying between 46 and 110 bpm with an average of 73 bpm.     VENTRICULAR ARRHYTHMIAS  1. There were very rare PVCs recorded totalling 8 and averaging less than 1 per hour.     2. There were no episodes of ventricular tachycardia.    SUPRA VENTRICULAR ARRHYTHMIAS  1. There were very frequent PACs totalling 3054 and averaging 132 per hour.  There were 29 bigeminal cycles.  There were 103 couplets.    2. 3% of complexes.    3. There were no episodes of sustained supraventricular tachycardia.    SINUS NODE FUNCTION  1. There was no evidence of high grade SA khai block.     AV CONDUCTION  1. There was no evidence of high grade AV block.     2. The longest RR interval was 1565 msec.     DIARY  1. The diary was properly completed.     2. There was 1 episode of skipping beats reported. The corresponding rhythm strips revealed the following:             During event 1 the rhythm was sinus rhythm at 75 bpm.     3. There was 1 episode of skipped beat reported. The corresponding rhythm strips revealed the following:             During event 1 the rhythm was sinus arrhythmia at 63 bpm.     MISCELLANEOUS  1. This was a tape of adequate length (23 hrs).     Since visit of 10/15, place on new antidepressant, Venlafaxine 75 mg daily, tried 2 and developed rapid HR to 125 bpm, feels more anxious, now switched to Citalopram. Also noted the daily dose of Lorazepam does not seem adequate. Orthostatic VS is positive with lying 142/73, , standing 120/62, . Been taking spironolactone 25 mg for last 3 days. Does feel thirsty. Labs reviewed A1C 5.8%, CBC, BMP were WNL. Lipid shows LDL 99, non-. Goal preferred is <70 and < 100. No problem with 10 mg of Crestor. Had vacation at [employer] [place], walked all over without problem.    Since visit of 1/18/2016, no new problem. 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 Labs with , non-,  "hsCRP at 2.56.     In 10/2016, had acute GB attack with rupture in 8/2016, then tried on Wellbutrin took only 1-2 tabs and BP up to 201/100 with head tightness. Subsequently back to normal, the last 2.5 days took Losartan bid. Discussed Rx options for HTN. Advised to be more active, regular exercise. Lab reviewed LDL in 8/2016 93.     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 Still with daily palpitations, last up to half hours. Have electronic watch, David Barroso, since 9/2016, suppose harmonize patient with the universe. Feeling better if on during the day. Lot of walking at school, no problem.   Holter in 10/2016 - PREDOMINANT RHYTHM  1. Sinus rhythm with heart rates varying between 52 and 144 bpm with an average of 75 bpm.     2. Paroxysmal atrial fibrillation    VENTRICULAR ARRHYTHMIAS  1. There were occasional mostly monomorphic PVCs totalling 596 and averaging 13 per hour.     2. There were no episodes of ventricular tachycardia.    SUPRA VENTRICULAR ARRHYTHMIAS  1. There were frequent PACs totalling 2982 and averaging 69 per hour.  There were 69 bigeminal cycles.  There were 55 couplets.    2. There were no episodes of sustained supraventricular tachycardia.    3. Paroxysmal atrial fibrillation was noted in the the study.     SINUS NODE FUNCTION  1. There was no evidence of high grade SA khai block.     AV CONDUCTION  1. There was no evidence of high grade AV khai filtering.     2. The longest RR interval was 1725 msec.     DIARY  1. The diary was returned, but not completed    MISCELLANEOUS  1. This was a tape of adequate length (43 hrs).     ECHO CONCLUSIONS     1 - Hyperdynamic left ventricular systolic function (EF 65-70%).     2 - Normal left ventricular diastolic function.     3 - Normal right ventricular systolic function .     4 - Pulmonary hypertension. The estimated PA systolic pressure is 64 mmHg.     5 - Less evidence for LVH from Echo in 11/2014.     In 5/2017, bothered by recurrent " "PAF with AVILA after 10 feet walking. Felt better before on Flecainide 100 mg bid, but Holter continued to show PAF. Attempt up titration, problem with itching, switched to propafenone 150 mg tid, continued with itching and reviewed by allergist, treated with 10 days of cortisone with benefit. No hive and no itching, just don't feel good due to the irregular rhythm. History reviewed, had 2 prior AF ablation, last in [place], [place] in around 2000, recall being told not to do again. Recall seeing an [facility] EP doc but didn't like him. Discussed various options. Will stop antiarrhythmic for now, will be going on a 16 days trip to [place]. Reviewed prior medications, have taken Tenormin, metoprolol tartrate, and short-acting diltiazem in the past without problem. Refer to Dr. Calderon for review. ECG in AF, rate 99, PRWP, LAFB    In 11/2017, post AF ablation, felt good for a few weeks, noted some SOB and had review with Dr. Calderon on 11/1 - 72 year old female with a longstanding history of atrial arrhythmias. Her [id number] Score is 5 (age, female, TIA, HTN) so she should remain on anticoagulation indefinitely. She has failed Flecainide. She is now 6 weeks post-PVI and MA flutter line, and maintaining sinus rhythm on low-dose Amiodarone. Given her intermittent AVILA which started post-ablation, I have stopped Amiodarone which I think is the likely culprit. I instructed her to continue taking Lasix PRN, as well as metoprolol and Xarelto." ECG on 11/1 was in NSR, rate 61, PRWP.  Recommended to stop amiodarone but then started to feel the palpitation daily, felt nervous and at the same time being taper down on the nightly Ativan. Did have some breakthrough anxiety attacks. Also had strained the upper back and right arm / shoulder, requesting some Tramadol, tried Jan's Tramadol with good relief. Understand this is an opoid. Used Excedrin with caffeine and bothered by more palpitations.    In 3/2018, here for recurrent " "palpitations, no inciting factors over the past 6 weeks. Had review with Dr. Calderon in 2/2018 - "72 year old female with a longstanding history of atrial arrhythmias and prior ablations at outside institutions. Her [id number] Score is 5 (age, female, TIA, HTN) so she should remain on anticoagulation indefinitely. She is now 5 months post-PVI and MA flutter line, and maintaining sinus rhythm off Amiodarone. The plan is to continue Xarelto, and to see me again in 6 months." Palpitations appears associated with AVILA, had difficulties walking in house or up a ramp. Started 100 mg of amiodarone last week, daily, feels some benefit. ECG today in Sinus rhythm vs wandering atrial pacemaker with frequent PJC, rate 59. Also taking Losartan in the morning appears more effective.  Holter 11/2017 - PREDOMINANT RHYTHM  1. Sinus arrhythmia with heart rates varying between 39 and 102 bpm with an average of 58 bpm.     VENTRICULAR ARRHYTHMIAS  1. There were occasional PVCs totalling 728 and averaging 15 per hour.  There were 5 bigeminal cycles.     2. There were no episodes of ventricular tachycardia.    SUPRA VENTRICULAR ARRHYTHMIAS  1. There was no supraventricular ectopic activity.    SINUS NODE FUNCTION  1. There was no evidence of high grade SA khai block.     AV CONDUCTION  1. There was no evidence of high grade AV block.     2. The longest RR interval was 1820 msec.     DIARY  1. The diary was not returned    MISCELLANEOUS  1. There were occasional hookup related artifacts. Overall, the study was of good quality.     2. This was a tape of adequate length (48 hrs).     in 5/2018, no new problem, still concern of AVILA, usually with walking, variable as little 10 feet or fine with some long walk, can play flute for an hour but find difficulties in holding a note. Off daily amiodarone, uses it prn for palpitation, find helpful. Labs reviewed from 1/2018, TSH, Vitamin D, LDL 68.2, A1C 5.9%, CMP - normal renal function, K+ 3.6. To " "go to [place] for a month in 8/2018.    In 9/2018, return from a month family reunion trip to [place]. Problem with hypotension with Tramadol and Losartan. Had review with Dr. Calderon on 9/5 "Ayla Dela Cruz is a 73 year old female with a longstanding history of atrial arrhythmias and prior ablations at outside institutions. Her [id number] Score is 5 (age, female, TIA, HTN) so she should remain on anticoagulation indefinitely. She is now 11 months post-PVI and MA flutter line, and maintaining sinus rhythm off Amiodarone. However, she is feeling poorly, with frequent PACs and borderline hypotension. The plan is to stop Losartan, echo in next several weeks, Holter monitor in 6 weeks, and follow-up with me in 8 weeks."  Off both medication on 8/31, no problem now. No heart worry. Denies any CP nor SOB. Still teaching. ECG on 9/5 shows NSR with sinus arrhythmia.    In 3/2019, return for follow up, had syncopal spell with hypotension at [facility] required reconstruction of the right ankle, now in PT, doing well. No heart complication, no AF. LDL 90.6 in 1/2019. 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 No heart worries, no CP nor SOB, much better, breathing improved with daily Breo use. Discussed option with NOAC.    In 9/2019, 6 months review, concern of bruising with Xarelto, recall problem with dark stool with Eliquis. Discussed option.     In 1/2020 return due to allergic reaction to Pradaxa, had to stop Eliquis for similar problems - hives and rash, difficult to sleep. Also problem with embolism when off NOAC for 7 days. Discuss alternatives.    In 3/2020, same problem with Savaysa, noted about an hour of PAF this morning, felt nervous. Troubled by recent viral infection and also pain injection with steroid, which helped the rash temporary. Would like to proceed with mechanical alternative. History include past use of Coumadin for about 2 years, had some difficult with GIB and also required up " to 3 times a week testing.    HPI comments: in 8/2020, here for 6-months review. Troubled more with JOSE with quarantine. Working with therapist. Keeping busy with working on a new book, music also. No heart worries.    ROS    Constitutional: negative for chills, fatigue, fevers, sweats, no further slurred speech, and no dysarthria, some lost of appetite with JOSE, intolerance to heat, bruises easily on NOAC and steroid, weight down 6 lbs since 3/2020.   Eyes: negative for icterus, redness and visual disturbance, have visual halo, no more bleed in the right Iris  Respiratory: negative for asthma, cough have resolved off ACE-I, have dyspnea on exertion, occasional SOB with HR 85 to 100 bpm, have lifelong snoring, [place] score 7 improved down to 6, no hemoptysis, pleurisy/chest pain and wheezing  Cardiovascular: negative for chest pain, chest pressure/discomfort, no further orthostatic dizziness, and no palpitations, no paroxysmal nocturnal dyspnea and syncope  Gastrointestinal: negative for abdominal pain, nausea and vomiting, have GERD  Musculoskeletal:positive for arthralgias, and less right sided muscle weakness with improvement in leg strength, improved bilateral ankle pains - OA and no myalgias  Neurological: negative for coordination problems, dizziness, gait problems, headaches, paresthesia, have some tremors but able to draw and no vertigo  Psych: positive for depression, nervousness, anxiety, less stressed due to 's have improved  Skin - right back pruritic rash.    Objective:    Physical Exam    General appearance: alert, appears stated age, cooperative and no distress, decrease skin turgor.  Head: Normocephalic, without obvious abnormality, atraumatic  Eyes:  conjunctivae/corneas clear. PERRL, EOM's intact.    Neck: no adenopathy, no carotid bruit, no JVD, supple, symmetrical, trachea midline and thyroid not enlarged, symmetric, no tenderness/mass/nodules  Lungs:  clear to auscultation  "bilaterally  Chest wall: no tenderness  Heart: regular rate and rhythm, normal S1, S2 normal, occasional grade 2/6 systolic murmur, click, rub or gallop    Abdomen: soft, non-tender; bowel sounds normal; no masses,  no organomegaly, waist 31" down to 29.5" hip 42"  Extremities: extremities no further weakness of the right upper extremity, atraumatic, no cyanosis and have no edema, minor varicose veins  Pulses: 2+ and symmetric    Assessment:       1. PAF (paroxysmal atrial fibrillation)    2. Second degree AV block, Mobitz type I    3. JOSE (generalized anxiety disorder)    4. Anticoagulant long-term use    5. Chronic diastolic heart failure, 2014    6. H/O: CVA (cerebrovascular accident), June 2014    7. Pure hypercholesterolemia    8. Essential hypertension    9. Hypertensive left ventricular hypertrophy, without heart failure    10. Microalbuminuria    11. Renal infarct, due to embolism off Eliquis for 7 days    12. S/P ablation of atrial flutter    13. TIA (transient ischemic attack), 11/3/2014    14. Anemia, unspecified type         Plan:       Ayla was seen today for follow-up.    Diagnoses and associated orders for this visit:    PAF (paroxysmal atrial fibrillation)  -     EKG 12-lead  -     Ambulatory referral/consult to Cardiac Electrophysiology; Future; Expected date: 09/02/2020    Second degree AV block, Mobitz type I    JOSE (generalized anxiety disorder)    Anticoagulant long-term use  -     Ambulatory referral/consult to Cardiac Electrophysiology; Future; Expected date: 09/02/2020    Chronic diastolic heart failure, 2014    H/O: CVA (cerebrovascular accident), June 2014    Pure hypercholesterolemia    Essential hypertension    Hypertensive left ventricular hypertrophy, without heart failure    Microalbuminuria    Renal infarct, due to embolism off Eliquis for 7 days    S/P ablation of atrial flutter  -     Ambulatory referral/consult to Cardiac Electrophysiology; Future; Expected date: " 09/02/2020    TIA (transient ischemic attack), 11/3/2014    Anemia, unspecified type    - All medical issues reviewed, will need to consider mechanical ablation of STACEY  - CV status stable, off antiarrhythmic, all medications reviewed, patient acknowledge good understanding.  - Recommend using Tylenol as first line pain medications.  - Instruction for Mediterranean diet and heart healthy exercise given.  - Need good exercise program, 4 key elements: 1. Aerobic (walking, swimming, dancing, jogging, biking, etc, 2. Muscle strengthening / resistance exercise, need to do 2-3 times weekly, 3. Stretching daily, good stretch takes a whole  total minute. 4. Balance exercise daily.  - Highly recommend 30 minutes of exercise daily, can have Sunday off, with 2-3 sessions of muscle strengthening weekly. A  would be very helpful.  - Recommend at least biannual cardiovascular evaluation in view of her significant risk factors, patient 's preference    Patient Active Problem List   Diagnosis    Paroxysmal atrial fibrillation, ablations X 3 1995, 1997, 9/2017, CHADS-VAS score 5, HAS-BLED score 5     Hypertension, 1985    Hyperlipidemia, baseline     Glaucoma (increased eye pressure)    Fibroid uterus    Thickened endometrium    Abnormal CT scan, chest    Interstitial lung disease    H/O: CVA (cerebrovascular accident), June 2014    LVH (left ventricular hypertrophy) due to hypertensive disease    Depression    OA (osteoarthritis)    Chronic diastolic heart failure, 2014    Microalbuminuria    Hypoalbuminemia    TIA (transient ischemic attack), 11/3/2014    S/P ablation of atrial flutter    JOSE (generalized anxiety disorder)    Other spondylosis, lumbar region    Cervical spondylosis    Medication intolerance    Mild persistent asthma without complication    Anticoagulant long-term use    GERD (gastroesophageal reflux disease)    Renal infarct, due to embolism off Eliquis for 7 days     Mild intermittent asthma without complication    Transaminitis    Body mass index (BMI) 19.9 or less, adult    Localized hives    Elevated AST (SGOT)    Iron deficiency anemia due to chronic blood loss    Other specified spondylopathies, lumbar region    Recurrent major depressive disorder, in partial remission    Gastroenteritis    Other spondylosis, cervical region    History of DVT (deep vein thrombosis)    Chronic sinus complaints    Second degree AV block, Mobitz type I    Anemia     Greater than 50% was spent in counseling and coordination of care. The above assessment and plan have been discussed at length. Labs and procedure over the last 6 months reviewed. Problem List updated. Asked to bring in all active medications / pills bottles with next visit.

## 2020-08-28 ENCOUNTER — TELEPHONE (OUTPATIENT)
Dept: PAIN MEDICINE | Facility: CLINIC | Age: 75
End: 2020-08-28

## 2020-08-28 NOTE — TELEPHONE ENCOUNTER
Spoke with pt's ins co. The procedure has to be 6 month and one day to be approved. Rescheduled from 9/3 to 9/11. Approval # [id number]. Pt agreed to 9/11 and covid 9/8

## 2020-08-28 NOTE — TELEPHONE ENCOUNTER
----- Message from Roby Christopher sent at 8/28/2020  8:50 AM CDT -----  Contact: Xiang  Type: Needs Medical Advice  Who Called:  Xiang Oliver Call Back Number: [phone]    Additional Information: called about a PA needed for an epidural for the pt in regards to CPT codes 54130 and 70053.  Please call to advise

## 2020-08-31 ENCOUNTER — PATIENT MESSAGE (OUTPATIENT)
Dept: FAMILY MEDICINE | Facility: CLINIC | Age: 75
End: 2020-08-31

## 2020-08-31 ENCOUNTER — PATIENT MESSAGE (OUTPATIENT)
Dept: SURGERY | Facility: AMBULARY SURGERY CENTER | Age: 75
End: 2020-08-31

## 2020-08-31 ENCOUNTER — EXTERNAL CHRONIC CARE MANAGEMENT (OUTPATIENT)
Dept: PRIMARY CARE CLINIC | Facility: CLINIC | Age: 75
End: 2020-08-31
Payer: MEDICARE

## 2020-08-31 PROCEDURE — 99490 PR CHRONIC CARE MGMT, 1ST 20 MIN: ICD-10-PCS | Mod: S$GLB,,, | Performed by: FAMILY MEDICINE

## 2020-08-31 PROCEDURE — 99490 CHRNC CARE MGMT STAFF 1ST 20: CPT | Mod: S$GLB,,, | Performed by: FAMILY MEDICINE

## 2020-09-08 ENCOUNTER — LAB VISIT (OUTPATIENT)
Dept: PRIMARY CARE CLINIC | Facility: CLINIC | Age: 75
End: 2020-09-08
Payer: MEDICARE

## 2020-09-08 DIAGNOSIS — Z01.818 PREOP TESTING: ICD-10-CM

## 2020-09-08 PROCEDURE — U0003 INFECTIOUS AGENT DETECTION BY NUCLEIC ACID (DNA OR RNA); SEVERE ACUTE RESPIRATORY SYNDROME CORONAVIRUS 2 (SARS-COV-2) (CORONAVIRUS DISEASE [COVID-19]), AMPLIFIED PROBE TECHNIQUE, MAKING USE OF HIGH THROUGHPUT TECHNOLOGIES AS DESCRIBED BY CMS-2020-01-R: HCPCS

## 2020-09-09 LAB — SARS-COV-2 RNA RESP QL NAA+PROBE: NOT DETECTED

## 2020-09-09 NOTE — DISCHARGE INSTRUCTIONS
Before leaving, please make sure you have all your personal belongings such as glasses, purses, wallets, keys, cell phones, jewelry, jackets etc      Radiofrequency of Nerves    After this procedure you may have increased pain for three days and lingering pain for up to 6 weeks.   Most patients feel better after 4-6  weeks.    A steroid may also be injected to help with pain relief.  Steroids can have side effect of flushed face and nervous feeling.  Use your pain medications as needed but the goal of this treartment is to wean you off your pain medication.  You may have weakness after the procedure due to the numbing injection.  You may feel a sunburn effect and some patients may need a burn cream for the skin after 2 days.    Use ice pack for discomfort :apply for 20 minutes, remove for 20 minutes for up to 2 days. Do not sleep with ice pack.  Do not use a heating pad or take tub baths or swim for 2 days.  You may shower today  Gradually increase your activities.  Dont lift anything over 10 lbs for the first 24 hours  Dont drive the day of the procedure Wait 24 hrs to drive.  Wait until tomorrow to resume any blood thinners (aspirin, Plavix, Coumadin) but you may resume all your other medications today.  If Diabetic, monitor you glucose carefully due to steroids  used for this procedure    Seek Medical Attention if you have:  Severe pain or headache  Fever or chills  Redness or swelling around the injection site   Difficulty breathing  Vomiting or Increasing numbness or weakness in arms or legs    After Surgery:  Always be aware that any surgery can cause these symptoms:    Pain- Medication can be prescribed for pain to decrease your pain but may not completely take your pain away.  Over the Counter pain medicine my be enough and you can always use Ice and rest to help ease pain.    Bleeding- a little bleeding after a surgery is usually within normal.  If there is a lot of blood you need to notify your MD.   Emergency treatments of bleeding are cold application, elevation of the bleeding site and compression.    Infection- Infection after surgery is NOT a normal occurrence.  Signs of infection are fever, swelling, hot to touch the incision.  If this occurs notify your MD immediately.    Nausea- this can be common after a surgery especially if you have had anesthesia medicine or are taking pain medicine. The steroid the Dr injected can have a side effect of Nausea.  Staying on clear liquids, bland foods, gingerale, or over the counter anti nausea medicines can help.  If you vomit more than once, notify your MD.  Anti Nausea medicines can be prescribed.

## 2020-09-11 ENCOUNTER — HOSPITAL ENCOUNTER (OUTPATIENT)
Facility: AMBULARY SURGERY CENTER | Age: 75
Discharge: HOME | End: 2020-09-11
Attending: ANESTHESIOLOGY | Admitting: ANESTHESIOLOGY
Payer: MEDICARE

## 2020-09-11 VITALS
HEART RATE: 55 BPM | RESPIRATION RATE: 20 BRPM | WEIGHT: 121.94 LBS | OXYGEN SATURATION: 98 % | HEIGHT: 67 IN | TEMPERATURE: 98 F | DIASTOLIC BLOOD PRESSURE: 88 MMHG | SYSTOLIC BLOOD PRESSURE: 182 MMHG | BODY MASS INDEX: 19.14 KG/M2

## 2020-09-11 DIAGNOSIS — M47.896 OTHER SPONDYLOSIS, LUMBAR REGION: Primary | ICD-10-CM

## 2020-09-11 PROCEDURE — 64636 DESTROY L/S FACET JNT ADDL: CPT | Mod: RT | Performed by: ANESTHESIOLOGY

## 2020-09-11 PROCEDURE — 64636 PR DESTROY L/S FACET JNT ADDL: ICD-10-PCS | Mod: 50,ICN,, | Performed by: ANESTHESIOLOGY

## 2020-09-11 PROCEDURE — 99152 MOD SED SAME PHYS/QHP 5/>YRS: CPT | Mod: ICN,,, | Performed by: ANESTHESIOLOGY

## 2020-09-11 PROCEDURE — 64635 DESTROY LUMB/SAC FACET JNT: CPT | Mod: LT | Performed by: ANESTHESIOLOGY

## 2020-09-11 PROCEDURE — 64635 PR DESTROY LUMB/SAC FACET JNT: ICD-10-PCS | Mod: 50,ICN,, | Performed by: ANESTHESIOLOGY

## 2020-09-11 PROCEDURE — 64635 DESTROY LUMB/SAC FACET JNT: CPT | Mod: 50,ICN,, | Performed by: ANESTHESIOLOGY

## 2020-09-11 PROCEDURE — 99152 PR MOD CONSCIOUS SEDATION, SAME PHYS, 5+ YRS, FIRST 15 MIN: ICD-10-PCS | Mod: ICN,,, | Performed by: ANESTHESIOLOGY

## 2020-09-11 PROCEDURE — 64636 DESTROY L/S FACET JNT ADDL: CPT | Mod: 50,ICN,, | Performed by: ANESTHESIOLOGY

## 2020-09-11 RX ORDER — METHYLPREDNISOLONE ACETATE 80 MG/ML
INJECTION, SUSPENSION INTRA-ARTICULAR; INTRALESIONAL; INTRAMUSCULAR; SOFT TISSUE
Status: DISCONTINUED | OUTPATIENT
Start: 2020-09-11 | End: 2020-09-11 | Stop reason: HOSPADM

## 2020-09-11 RX ORDER — BUPIVACAINE HYDROCHLORIDE 2.5 MG/ML
INJECTION, SOLUTION EPIDURAL; INFILTRATION; INTRACAUDAL
Status: DISCONTINUED | OUTPATIENT
Start: 2020-09-11 | End: 2020-09-11 | Stop reason: HOSPADM

## 2020-09-11 RX ORDER — MIDAZOLAM HYDROCHLORIDE 2 MG/2ML
INJECTION, SOLUTION INTRAMUSCULAR; INTRAVENOUS
Status: DISCONTINUED | OUTPATIENT
Start: 2020-09-11 | End: 2020-09-11 | Stop reason: HOSPADM

## 2020-09-11 RX ORDER — LIDOCAINE HYDROCHLORIDE 20 MG/ML
INJECTION, SOLUTION EPIDURAL; INFILTRATION; INTRACAUDAL; PERINEURAL
Status: DISCONTINUED | OUTPATIENT
Start: 2020-09-11 | End: 2020-09-11 | Stop reason: HOSPADM

## 2020-09-11 RX ORDER — LIDOCAINE HYDROCHLORIDE 10 MG/ML
INJECTION, SOLUTION EPIDURAL; INFILTRATION; INTRACAUDAL; PERINEURAL
Status: DISCONTINUED | OUTPATIENT
Start: 2020-09-11 | End: 2020-09-11 | Stop reason: HOSPADM

## 2020-09-11 RX ORDER — FENTANYL CITRATE 50 UG/ML
INJECTION, SOLUTION INTRAMUSCULAR; INTRAVENOUS
Status: DISCONTINUED | OUTPATIENT
Start: 2020-09-11 | End: 2020-09-11 | Stop reason: HOSPADM

## 2020-09-11 RX ORDER — SODIUM CHLORIDE, SODIUM LACTATE, POTASSIUM CHLORIDE, CALCIUM CHLORIDE 600; 310; 30; 20 MG/100ML; MG/100ML; MG/100ML; MG/100ML
INJECTION, SOLUTION INTRAVENOUS ONCE AS NEEDED
Status: COMPLETED | OUTPATIENT
Start: 2020-09-11 | End: 2020-09-11

## 2020-09-11 RX ADMIN — SODIUM CHLORIDE, SODIUM LACTATE, POTASSIUM CHLORIDE, CALCIUM CHLORIDE: 600; 310; 30; 20 INJECTION, SOLUTION INTRAVENOUS at 11:09

## 2020-09-11 NOTE — PLAN OF CARE
Stable states ready to go home, gaurav po fluids, denies pain, no leg weakness, feels a little dizzy, to car per wc with RN to

## 2020-09-11 NOTE — DISCHARGE SUMMARY
[facility]  Discharge Note  Short Stay    Admit Date: 9/11/2020    Discharge Date and Time: 9/11/2020    Attending Physician: Galo Clancy MD     Discharge Provider: Galo Clancy    Diagnoses:  Active Hospital Problems    Diagnosis  POA    *Other spondylosis, lumbar region [M47.896]  Yes      Resolved Hospital Problems   No resolved problems to display.       Hospital Course: Lumbar MB RFA  Discharged Condition: Good    Final Diagnoses:   Active Hospital Problems    Diagnosis  POA    *Other spondylosis, lumbar region [M47.896]  Yes      Resolved Hospital Problems   No resolved problems to display.       Disposition: Home or Self Care    Follow up/Patient Instructions:    Medications:  Reconciled Home Medications:      Medication List      CHANGE how you take these medications    rosuvastatin 40 MG Tab  Commonly known as: CRESTOR  TAKE 1 TABLET (40 MG TOTAL) BY MOUTH EVERY EVENING.  What changed: how much to take        CONTINUE taking these medications    ammonium lactate 12 % lotion  Commonly known as: LAC-HYDRIN  Apply topically once daily.     atropine 1% 1 % Drop  Commonly known as: ISOPTO ATROPINE  INSTILL 1 DROP INTO RIGHT EYE ONCE A DAY     ATROPINE SULFATE (PF) OPHT  Place 1 % into the right eye once daily.     BREO ELLIPTA 200-25 mcg/dose Dsdv diskus inhaler  Generic drug: fluticasone furoate-vilanteroL  Inhale 1 puff into the lungs once daily.     cetirizine 10 MG tablet  Commonly known as: ZYRTEC  Take 1 tablet (10 mg total) by mouth once daily.     COSOPT 22.3-6.8 mg/mL ophthalmic solution  Generic drug: dorzolamide-timolol 2-0.5%  Place 1 drop into both eyes 2 (two) times daily. Twice a day     famotidine 20 MG tablet  Commonly known as: PEPCID  TAKE 1 TABLET (20 MG TOTAL) BY MOUTH 2 (TWO) TIMES DAILY. FOR 10 DAYS     fluticasone propionate 50 mcg/actuation nasal spray  Commonly known as: FLONASE  1 spray (50 mcg total) by Each Nostril route once daily.     gabapentin 300 MG capsule  Commonly  known as: NEURONTIN  TAKE 1 CAPSULE BY MOUTH THREE TIMES A DAY     halobetasol 0.05 % cream  Commonly known as: ULTRAVATE  1 application 2 (two) times daily as needed. Apply to affected area     levalbuterol 45 mcg/actuation inhaler  Commonly known as: XOPENEX HFA  Inhale 1-2 puffs into the lungs every 4 (four) hours as needed for Wheezing or Shortness of Breath. Rescue     LORazepam 0.5 MG tablet  Commonly known as: ATIVAN  Take 0.5 mg by mouth every 12 (twelve) hours as needed for Anxiety. Leticia Enriquez     metoprolol tartrate 50 MG tablet  Commonly known as: LOPRESSOR  TAKE 1 TABLET (50 MG TOTAL) BY MOUTH 2 (TWO) TIMES DAILY.     ondansetron 8 MG Tbdl  Commonly known as: ZOFRAN-ODT  DISSOLVE ONE TABLET ONTO THE TONGUE ONCE DAILY AS DIRECTED     pantoprazole 40 MG tablet  Commonly known as: PROTONIX  Take 1 tablet (40 mg total) by mouth once daily.     PAZEO 0.7 % Drop  Generic drug: olopatadine  Place into both eyes as needed.     PRADAXA 150 mg Cap  Generic drug: dabigatran etexilate  Take 150 mg by mouth 2 (two) times daily.     pramoxine 1 % Foam  Place rectally 3 (three) times daily as needed.     promethazine 12.5 MG Supp  Commonly known as: PHENERGAN  Place 1 suppository (12.5 mg total) rectally every 6 (six) hours as needed.     VIIBRYD 40 mg Tab tablet  Generic drug: vilazodone  Take 40 mg by mouth once daily. DANIELA Enriquez     VRAYLAR 1.5 mg Cap  Generic drug: cariprazine  Take 1 capsule by mouth every evening. DANIELA Enriquez          Discharge Procedure Orders   Call MD for:  temperature >100.4     Call MD for:  persistent nausea and vomiting or diarrhea     Call MD for:  severe uncontrolled pain     Call MD for:  redness, tenderness, or signs of infection (pain, swelling, redness, odor or green/yellow discharge around incision site)     Call MD for:  difficulty breathing or increased cough     Call MD for:  severe persistent headache        Follow up with MD in 2-3 weeks    Discharge Procedure Orders (must  include Diet, Follow-up, Activity):   Discharge Procedure Orders (must include Diet, Follow-up, Activity)   Call MD for:  temperature >100.4     Call MD for:  persistent nausea and vomiting or diarrhea     Call MD for:  severe uncontrolled pain     Call MD for:  redness, tenderness, or signs of infection (pain, swelling, redness, odor or green/yellow discharge around incision site)     Call MD for:  difficulty breathing or increased cough     Call MD for:  severe persistent headache

## 2020-09-11 NOTE — OP NOTE
PROCEDURE DATE: 9/11/2020    PROCEDURE:  Radiofrequency ablation of the L3,4,5 medial branch nerves on the bilateral-side utilizing fluoroscopy    DIAGNOSIS:  Other lumbar spondylosis  Post op Diagnosis: Same    PHYSICIAN: Galo Clancy MD    MEDICATIONS INJECTED:  From a mixture of 6ml of 0.25% bupivicaine and 80mg of methylprednisone,  1ml of this solution was injected at each level.    LOCAL ANESTHETIC USED: Lidocaine 1%, 2 ml given at each site.    SEDATION MEDICATIONS: RN IV sedation    ESTIMATED BLOOD LOSS:  None    COMPLICATIONS:  None    TECHNIQUE:  A time out was taken to identify patient and procedure side prior to starting the procedure. Laying in a prone position, the patient was prepped and draped in the usual sterile fashion using ChloraPrep and sterile towels.  The levels were determined under fluoroscopic guidance and then marked.  Local anesthetic was given by raising a wheal at the skin over each site and then infiltrated approximately 2cm deeper.  A 20-gauge  100 mm RF needle was introduced to the anatomic location of the bilateral L3,4,5 medial branch nerves. Motor stimulation up to 2 Volts at each level confirmed no motor nerve involvement.  Impedance was less than 800 ohms at each level.  1ml of 2% lidocaine was instilled prior to lesioning.  Ablation was performed per level utilizing radiofrequency generator 80°C for 60 seconds.  Needles were then rotated 90° and a second lesion was performed.  The above noted medication was then injected slowly. The patient tolerated the procedure well.     The patient was monitored after the procedure.  Patient was given post procedure and discharge instructions to follow at home.  The patient was discharged in a stable condition

## 2020-09-11 NOTE — INTERVAL H&P NOTE
The patient has been examined and the H&P has been reviewed:    I concur with the findings and no changes have occurred since H&P was written.  This patient has been cleared for surgery in an ambulatory surgical facility    ASA 3,  Mallampatti Score 3  No history of anesthetic complications  Plan for RN IV sedation    Surgery risks, benefits and alternative options discussed and understood by patient/family.          Active Hospital Problems    Diagnosis  POA    Other spondylosis, lumbar region [M47.896]  Yes      Resolved Hospital Problems   No resolved problems to display.

## 2020-09-16 ENCOUNTER — TELEPHONE (OUTPATIENT)
Dept: FAMILY MEDICINE | Facility: CLINIC | Age: 75
End: 2020-09-16

## 2020-09-16 NOTE — TELEPHONE ENCOUNTER
Spoke with patient regarding elevated BP.  Her BP at home with her home cuff today was 114/79.  She is feeling well since her recent outpatient procedure.

## 2020-09-22 ENCOUNTER — PATIENT MESSAGE (OUTPATIENT)
Dept: RESEARCH | Facility: OTHER | Age: 75
End: 2020-09-22

## 2020-09-25 ENCOUNTER — PATIENT MESSAGE (OUTPATIENT)
Dept: ELECTROPHYSIOLOGY | Facility: CLINIC | Age: 75
End: 2020-09-25

## 2020-09-30 ENCOUNTER — EXTERNAL CHRONIC CARE MANAGEMENT (OUTPATIENT)
Dept: PRIMARY CARE CLINIC | Facility: CLINIC | Age: 75
End: 2020-09-30
Payer: MEDICARE

## 2020-09-30 PROCEDURE — 99490 PR CHRONIC CARE MGMT, 1ST 20 MIN: ICD-10-PCS | Mod: S$GLB,,, | Performed by: FAMILY MEDICINE

## 2020-09-30 PROCEDURE — 99490 CHRNC CARE MGMT STAFF 1ST 20: CPT | Mod: S$GLB,,, | Performed by: FAMILY MEDICINE

## 2020-10-23 ENCOUNTER — OFFICE VISIT (OUTPATIENT)
Dept: PAIN MEDICINE | Facility: CLINIC | Age: 75
End: 2020-10-23
Payer: MEDICARE

## 2020-10-23 VITALS
SYSTOLIC BLOOD PRESSURE: 139 MMHG | HEIGHT: 67 IN | WEIGHT: 121 LBS | HEART RATE: 70 BPM | BODY MASS INDEX: 18.99 KG/M2 | DIASTOLIC BLOOD PRESSURE: 77 MMHG

## 2020-10-23 DIAGNOSIS — M79.10 MYALGIA: ICD-10-CM

## 2020-10-23 DIAGNOSIS — M25.511 RIGHT SHOULDER PAIN, UNSPECIFIED CHRONICITY: ICD-10-CM

## 2020-10-23 DIAGNOSIS — M47.892 OTHER SPONDYLOSIS, CERVICAL REGION: ICD-10-CM

## 2020-10-23 DIAGNOSIS — M47.896 OTHER SPONDYLOSIS, LUMBAR REGION: Primary | ICD-10-CM

## 2020-10-23 PROCEDURE — 1159F MED LIST DOCD IN RCRD: CPT | Mod: S$GLB,,, | Performed by: PHYSICIAN ASSISTANT

## 2020-10-23 PROCEDURE — 3078F DIAST BP <80 MM HG: CPT | Mod: S$GLB,,, | Performed by: PHYSICIAN ASSISTANT

## 2020-10-23 PROCEDURE — 1101F PT FALLS ASSESS-DOCD LE1/YR: CPT | Mod: S$GLB,,, | Performed by: PHYSICIAN ASSISTANT

## 2020-10-23 PROCEDURE — 99213 PR OFFICE/OUTPT VISIT, EST, LEVL III, 20-29 MIN: ICD-10-PCS | Mod: S$GLB,,, | Performed by: PHYSICIAN ASSISTANT

## 2020-10-23 PROCEDURE — 3075F SYST BP GE 130 - 139MM HG: CPT | Mod: S$GLB,,, | Performed by: PHYSICIAN ASSISTANT

## 2020-10-23 PROCEDURE — 99999 PR PBB SHADOW E&M-EST. PATIENT-LVL IV: CPT | Mod: PBBFAC,,, | Performed by: PHYSICIAN ASSISTANT

## 2020-10-23 PROCEDURE — 1159F PR MEDICATION LIST DOCUMENTED IN MEDICAL RECORD: ICD-10-PCS | Mod: S$GLB,,, | Performed by: PHYSICIAN ASSISTANT

## 2020-10-23 PROCEDURE — 1125F AMNT PAIN NOTED PAIN PRSNT: CPT | Mod: S$GLB,,, | Performed by: PHYSICIAN ASSISTANT

## 2020-10-23 PROCEDURE — 1101F PR PT FALLS ASSESS DOC 0-1 FALLS W/OUT INJ PAST YR: ICD-10-PCS | Mod: S$GLB,,, | Performed by: PHYSICIAN ASSISTANT

## 2020-10-23 PROCEDURE — 3075F PR MOST RECENT SYSTOLIC BLOOD PRESS GE 130-139MM HG: ICD-10-PCS | Mod: S$GLB,,, | Performed by: PHYSICIAN ASSISTANT

## 2020-10-23 PROCEDURE — 99999 PR PBB SHADOW E&M-EST. PATIENT-LVL IV: ICD-10-PCS | Mod: PBBFAC,,, | Performed by: PHYSICIAN ASSISTANT

## 2020-10-23 PROCEDURE — 1125F PR PAIN SEVERITY QUANTIFIED, PAIN PRESENT: ICD-10-PCS | Mod: S$GLB,,, | Performed by: PHYSICIAN ASSISTANT

## 2020-10-23 PROCEDURE — 3078F PR MOST RECENT DIASTOLIC BLOOD PRESSURE < 80 MM HG: ICD-10-PCS | Mod: S$GLB,,, | Performed by: PHYSICIAN ASSISTANT

## 2020-10-23 PROCEDURE — 99213 OFFICE O/P EST LOW 20 MIN: CPT | Mod: S$GLB,,, | Performed by: PHYSICIAN ASSISTANT

## 2020-10-23 RX ORDER — BUPROPION HYDROCHLORIDE 100 MG/1
TABLET, EXTENDED RELEASE ORAL
COMMUNITY
Start: 2020-10-19 | End: 2021-01-04

## 2020-10-23 RX ORDER — INFLUENZA A VIRUS A/MICHIGAN/45/2015 X-275 (H1N1) ANTIGEN (FORMALDEHYDE INACTIVATED), INFLUENZA A VIRUS A/SINGAPORE/INFIMH-16-0019/2016 IVR-186 (H3N2) ANTIGEN (FORMALDEHYDE INACTIVATED), INFLUENZA B VIRUS B/PHUKET/3073/2013 ANTIGEN (FORMALDEHYDE INACTIVATED), AND INFLUENZA B VIRUS B/MARYLAND/15/2016 BX-69A ANTIGEN (FORMALDEHYDE INACTIVATED) 60; 60; 60; 60 UG/.7ML; UG/.7ML; UG/.7ML; UG/.7ML
INJECTION, SUSPENSION INTRAMUSCULAR
COMMUNITY
Start: 2020-08-30 | End: 2021-01-05 | Stop reason: CLARIF

## 2020-10-23 RX ORDER — METRONIDAZOLE 7.5 MG/G
LOTION TOPICAL
COMMUNITY
Start: 2020-09-29 | End: 2021-01-05 | Stop reason: CLARIF

## 2020-10-23 NOTE — PROGRESS NOTES
Referring Physician: No ref. provider found    PCP: Jan Olivo MD    CC:  Lower back pain    Interval History:  Mrs. Dela Cruz is a 75 y.o. female  with  Chronic low back pain who presents today for f/u s/p lumbar MB RFA at L3,4, 5 that provided about 50% relief so far. Continues to benefit from Cervical RFA that provided >80% relief..She had a right shoulder injection last year that provided minimal benefit.    She denies any worsening weakness.  No bowel bladder changes. She does not have a home exercises plan but she wants to start going to the gym. Pain today is rated 4/10.    Prior HPI:   Ayla Dela Cruz is a 75 y.o. female who presents as a new patient from Dr. Grubbs for 2.5 month history of R shoulder/mid back pain. The pain first began when she lifted some books at a bookstore in early November. She began feeling the pain in her R lateral neck/suprascapular region, with radiation down the back/side of her arm to the top of the elbow. She was treated with some topical creams, chiropractor adjustments, and then was placed on gabapentin by Dr. Grubbs, which has helped her pain a considerable amount. She states that the pain is intermittent, aching, sharp, shooting & radiates to her elbow. Worsened by sitting, or lying in bed. 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     Interventional history:  Cervical epidural steroid injection on 01/2018 and 04/2018 with moderate benefit of her neck and right shoulder pain.  - Cervical right C 4-6 on 07/2018 with 60% relief of her right-sided neck pain.  - lumbar MB RFA on 09/2018 with 60% relief of her lower back pain    ROS:  CONSTITUTIONAL: No fevers, chills, night sweats, wt. loss, appetite changes  SKIN: no rashes or itching  ENT: No headaches, head trauma, vision changes, or eye pain  LYMPH NODES: None noticed   CV: No chest pain, palpitations.   RESP: No shortness of breath, dyspnea on exertion, cough, wheezing, or hemoptysis  GI: No nausea, emesis,  diarrhea, constipation, melena, hematochezia, pain.    : No dysuria, hematuria, urgency, or frequency   HEME: No easy bruising, bleeding problems  PSYCHIATRIC: No anxiety, psychosis, hallucinations. +ve depression  NEURO: No seizures, memory loss, dizziness, +ve difficulty sleeping  MSK: +HPI      Past Medical History:   Diagnosis Date    Anticoagulant long-term use     Anxiety     Arthritis     Atrial fibrillation     Cancer     skin    CHF (congestive heart failure)     Depression     DVT (deep venous thrombosis)     GERD (gastroesophageal reflux disease)     Glaucoma (increased eye pressure)     Hyperlipidemia     diet controlled    Hypertension     Interstitial lung disease     Localized hives 1/10/2020    Mild persistent asthma without complication 11/12/2018    Pneumonia 1/31/2014    Stroke 6-3-14    Stroke     TIA (transient ischemic attack)     TIA (transient ischemic attack)      Past Surgical History:   Procedure Laterality Date    2 heart ablations      3 in total    bilateral cataracts      CHOLECYSTECTOMY      COLONOSCOPY N/A 1/19/2017    Procedure: COLONOSCOPY and EGD;  Surgeon: Jonathan Schultz MD;  Location: [facility];  Service: Endoscopy;  Laterality: N/A;    COLONOSCOPY N/A 10/10/2019    Procedure: COLONOSCOPY;  Surgeon: Alex Christian MD;  Location: [facility];  Service: Endoscopy;  Laterality: N/A;    ESOPHAGOGASTRODUODENOSCOPY N/A 10/14/2019    Procedure: EGD (ESOPHAGOGASTRODUODENOSCOPY);  Surgeon: Alex Christian MD;  Location: [facility];  Service: Endoscopy;  Laterality: N/A;    INJECTION OF ANESTHETIC AGENT AROUND MEDIAL BRANCH NERVES INNERVATING CERVICAL FACET JOINT Right 6/7/2018    Procedure: BLOCK, NERVE, FACET JOINT, MEDIAL BRANCH, CERVICAL;  Surgeon: Galo Clancy MD;  Location: [facility] OR;  Service: Pain Management;  Laterality: Right;  C4, 5, 6    OPEN REDUCTION AND INTERNAL FIXATION (ORIF) OF INJURY OF ANKLE Right 11/1/2018    Procedure: ORIF, ANKLE;   Surgeon: Wilfredo Aguero MD;  Location: [facility] OR;  Service: Orthopedics;  Laterality: Right;    pyloristenosis      RADIOFREQUENCY ABLATION OF LUMBAR MEDIAL BRANCH NERVE AT SINGLE LEVEL Bilateral 9/21/2018    Procedure: RADIOFREQUENCY ABLATION, NERVE, SPINAL, LUMBAR, MEDIAL BRANCH, 1 LEVEL;  Surgeon: Galo Clancy MD;  [facility];  Service: Pain Management;  Laterality: Bilateral;  L3, 4, 5    RADIOFREQUENCY ABLATION OF LUMBAR MEDIAL BRANCH NERVE AT SINGLE LEVEL Bilateral 2/19/2019    Procedure: Radiofrequency Ablation, Nerve, Spinal, Lumbar, Medial Branch, 1 Level;  Surgeon: Galo Clancy MD;  Location: [facility];  Service: Pain Management;  Laterality: Bilateral;  L3, 4, 5     RADIOFREQUENCY ABLATION OF LUMBAR MEDIAL BRANCH NERVE AT SINGLE LEVEL Bilateral 3/10/2020    Procedure: Radiofrequency Ablation, Nerve, Spinal, Lumbar, Medial Branch, 1 Level;  Surgeon: Galo Clancy MD;  Location: [facility];  Service: Pain Management;  Laterality: Bilateral;  L3,4,5 - Burned at 80 degrees C. for 60 seconds x 2 each site      RADIOFREQUENCY ABLATION OF LUMBAR MEDIAL BRANCH NERVE AT SINGLE LEVEL Bilateral 9/11/2020    Procedure: Radiofrequency Ablation, Nerve, Spinal, Lumbar, Medial Branch, 1 Level;  Surgeon: Galo Clancy MD;  [facility];  Service: Pain Management;  Laterality: Bilateral;  L3, 4, 5 - Burned at 80 degrees C. for 60 seconds x 2 each site    RADIOFREQUENCY THERMAL COAGULATION OF MEDIAL BRANCH OF POSTERIOR RAMUS OF CERVICAL SPINAL NERVE Right 7/3/2018    Procedure: RADIOFREQUENCY THERMAL COAGULATION, NERVE, SPINAL, CERVICAL, MEDIAL BRANCH OF POSTERIOR RAMUS;  Surgeon: Galo Clancy MD;  Location: [facility];  Service: Pain Management;  Laterality: Right;  C4,5,6 - Burned at 80 degrees C. for 75 seconds each site    RADIOFREQUENCY THERMAL COAGULATION OF MEDIAL BRANCH OF POSTERIOR RAMUS OF CERVICAL SPINAL NERVE Right 7/23/2019    Procedure: RADIOFREQUENCY THERMAL COAGULATION, NERVE, SPINAL, CERVICAL,  POSTERIOR RAMUS, MEDIAL BRANCH;  Surgeon: Galo Clancy MD;  Location: [facility] OR;  Service: Pain Management;  Laterality: Right;  C4,5,6    RADIOFREQUENCY THERMAL COAGULATION OF MEDIAL BRANCH OF POSTERIOR RAMUS OF CERVICAL SPINAL NERVE Right 6/23/2020    Procedure: RADIOFREQUENCY THERMAL COAGULATION, NERVE, SPINAL, CERVICAL, POSTERIOR RAMUS, MEDIAL BRANCH;  Surgeon: Galo Clancy MD;  Location: [facility] OR;  Service: Pain Management;  Laterality: Right;  C4, 5, 6    RADIOFREQUENCY THERMOCOAGULATION Bilateral 9/10/2019    Procedure: RADIOFREQUENCY THERMAL COAGULATION LUMBAR;  Surgeon: Galo Clancy MD;  Location: [facility] OR;  Service: Pain Management;  Laterality: Bilateral;  L3,4,5 - Burned at 80 degrees C. for 60 seconds x 2 each site    skin cancer removal       TONSILLECTOMY       Family History   Problem Relation Age of Onset    Heart disease Father     Ulcers Father     Arthritis Mother     Asthma Mother     Rheum arthritis Mother     Pneumonia Mother     Depression Son     Alzheimer's disease Maternal Uncle     Rheum arthritis Maternal Grandmother     Emphysema Maternal Grandfather     Cancer Maternal Grandfather         kidney    Kidney disease Maternal Grandfather     Cancer Paternal Grandmother         lung= smoker    Pneumonia Paternal Grandfather     Breast cancer Neg Hx     Ovarian cancer Neg Hx      Social History     Socioeconomic History    Marital status:      Spouse name: Not on file    Number of children: Not on file    Years of education: Not on file    Highest education level: Not on file   Occupational History    Not on file   Social Needs    Financial resource strain: Not hard at all    Food insecurity     Worry: Never true     Inability: Never true    Transportation needs     Medical: No     Non-medical: No   Tobacco Use    Smoking status: Never Smoker    Smokeless tobacco: Never Used   Substance and Sexual Activity    Alcohol use: No     Frequency: Monthly or less      "Drinks per session: 1 or 2     Binge frequency: Never    Drug use: No    Sexual activity: Yes     Partners: Male   Lifestyle    Physical activity     Days per week: 0 days     Minutes per session: 0 min    Stress: To some extent   Relationships    Social connections     Talks on phone: Three times a week     Gets together: Once a week     Attends Anabaptist service: Not on file     Active member of club or organization: Yes     Attends meetings of clubs or organizations: More than 4 times per year     Relationship status:    Other Topics Concern    Not on file   Social History Narrative    Not on file         Medications/Allergies: See med card    Vitals:    10/23/20 0755   BP: 139/77   Pulse: 70   Weight: 54.9 kg (121 lb)   Height: 5' 7" (1.702 m)   PainSc:   4   PainLoc: Back       Physical exam:    GENERAL: A and O x3, the patient appears well groomed and is in no acute distress.  Skin: No rashes or obvious lesions  HEENT: normocephalic, atraumatic  CARDIOVASCULAR:  RRR  LUNGS: non labored breathing  ABDOMEN: soft, nontender   UPPER EXTREMITIES: Normal alignment, normal range of motion, no atrophy, no skin changes,  hair growth and nail growth normal and equal bilaterally. No swelling. Moderate tenderness to AC joint on the right. Pain with flexion at 90 degrees.   LOWER EXTREMITIES:  Normal alignment, normal range of motion, no atrophy, no skin changes,  hair growth and nail growth normal and equal bilaterally. No swelling, no tenderness.  CERVICAL SPINE:  Cervical spine: ROM is full in flexion, extension and lateral rotation with increased pain on the right side with lateral rotation and extension.   Spurling's maneuver causes no neck pain to either side.  Myofascial exam: tender to palpation along suprascapular muscle and anterior pressure of shoulder.  MENTAL STATUS: normal orientation, speech, language, and fund of knowledge for social situation.  Emotional state appropriate.   LUMBAR " SPINE:  Full ROM with pain on flexion  Negative SLR bilaterally  TTP lumbar paraspinous muscles bilaterally.     CRANIAL NERVES:  II:  PERRL bilaterally,   III,IV,VI: EOMI.    V:  Facial sensation equal bilaterally  VII:  Facial motor function normal.  VIII:  Hearing equal to finger rub bilaterally  IX/X: Gag normal, palate symmetric  XI:  Shoulder shrug equal, head turn equal  XII:  Tongue midline without fasciculations      MOTOR: Tone and bulk: normal bilateral upper and lower Strength: normal   Delt Bi Tri WE WF     R 5 5 5 5 5 5   L 5 5 5 5 5 5     IP ADD ABD Quad TA Gas HAM  R 5 5 5 5 5 5 5  L 5 5 5 5 5 5 5    SENSATION: Light touch and pinprick intact bilaterally  REFLEXES: normal, symmetric, nonbrisk.  Toes down, no clonus. No hoffmans.  GAIT: normal rise, base, steps, and arm swing.        Imaging:  MRI C-spine 12/2017  There is a 2 mm retrolisthesis of C4 on C5 and C6 on C7.  There is reversal of the normal cervical lordosis which could relate to neck muscle spasm.The cervical vertebral bodies show normal signal intensity and height with no indication of acute fracture or pathologic marrow replacement process.    There is degenerative disc desiccation at every cervical level with marginal osteophyte formation and disc space narrowing noted at C3-C4, C4-C5, C5-C6, and to a lesser extent, C6-C7.  There is congenital spinal canal narrowing present.    The cervical cord is normal in signal intensity at all levels with no indication of myelomalacia or cord edema. The incidentally observed soft tissues of the neck show no significant abnormalities.    C2-C3: There is bilateral hypertrophic facet arthropathy.  There is left ligamentum flavum thickening.  No definite acquired central canal or neuroforaminal stenosis.    C3-C4: There is a posterior disc osteophyte complex with a superimposed broad central disc protrusion which flattens the ventral cord.  There is ligamentum flavum thickening, bilateral  uncovertebral spurring, and bilateral facet arthropathy, left greater than right.  There is moderate overall central canal stenosis, moderate-severe left neuroforaminal stenosis, and moderate right neuroforaminal stenosis.    C4-C5: There is a bulging posterior disc osteophyte complex, ligamentum flavum thickening, bilateral uncovertebral spurring, and facet arthropathy, right greater than left.  There is severe bilateral neural foraminal stenosis and moderate overall central canal stenosis.  No abnormal cord signal.  There is flattening of the ventral cord.    C5-C6: There is a posterior disc osteophyte complex and bilateral uncovertebral spurring, right greater than left.  No left neuroforaminal stenosis.  There is mild-moderate right neuroforaminal stenosis and mild-moderate overall central canal stenosis.    C6-C7: There is a 2 mm retrolisthesis of C6 on C7 with uncovering of the intervertebral disc and a superimposed posterior disc osteophyte complex.  There is bilateral uncovertebral spurring, left greater than right.  There is severe left and moderate right neuroforaminal stenosis.  There is ligamentum flavum thickening.  There is moderate central canal stenosis with flattening of the ventral cord.    C7-T1: No central canal or neuroforaminal stenosis. No disc protrusion or extrusion.    Assessment:  Mrs. Dela Cruz is a 72 y.o. female with     1. Other spondylosis, lumbar region    2. Other spondylosis, cervical region    3. Right shoulder pain, unspecified chronicity    4. Myalgia      Plan:  1. Recommend physical therapy and home exercise regimen to improve strength . Home exercises provided LCV  2. Continue Gabapentin 300 mg TID   3. Monitor progress and consider repeat bilateral lumbar MB RFA procedure to help with her lower back pain.  4  Monitor progress from repeat cervical MB RFA procedure.  5. F/u prn

## 2020-10-31 ENCOUNTER — EXTERNAL CHRONIC CARE MANAGEMENT (OUTPATIENT)
Dept: PRIMARY CARE CLINIC | Facility: CLINIC | Age: 75
End: 2020-10-31
Payer: MEDICARE

## 2020-10-31 PROCEDURE — 99490 PR CHRONIC CARE MGMT, 1ST 20 MIN: ICD-10-PCS | Mod: S$GLB,,, | Performed by: FAMILY MEDICINE

## 2020-10-31 PROCEDURE — 99490 CHRNC CARE MGMT STAFF 1ST 20: CPT | Mod: S$GLB,,, | Performed by: FAMILY MEDICINE

## 2020-11-02 NOTE — PROGRESS NOTES
aSubjective:      Cardiologist: Barrett Jurado MD    I had the pleasure of seeing Ayla Dela Cruz in follow up for her history of atrial arrhythmias and PVI. She last saw me in 1/2020. She is a 75 year old female with a history of HTN, HLD, and TIA in 2014, whose history of arrhythmias dates back to her 30s when she began to experience sudden onset palpitations. She was started on Tambacor at that time, which improved her symptoms. She was started on Coumadin at age 55 years. In the mid-1990s, she underwent an ablation for what sounds like atrial flutter in [place], [place]. In 1997 she underwent a second ablation which she was told was unsuccessful. Since that time she has undergone two electrical cardioversions, once in the mid-2000s (which lasted 5 years), and another around 2010. Around 2014 her arrhythmias recurred around the time she had her TIA, for which she was placed on Xarelto.    I reviewed all ECGs in the EMR at her initial office visit. ECGs from [id number] showed sinus rhythm. ECGs from 1/2014 and 4/2014 showed AF. ECGs between 6/2014 and 1/2016 showed sinus bradycardia and NSR. All ECGs between 1/16/2016 and 5/2017 showed either AF or AFL. When in AFL, RVR was generally seen.    In 9/2017, a PVI was performed. All four PVs were reconnected from a prior PVI, and successfully reisolated. A septal MA flutter line was also created due to frequent inductions during the case. The existing CTI-line was found to be intact. She was discharged on Amiodarone 100 mg daily. At her 11/2017 follow-up, Ms. Dela Cruz was feeling well apart from occasional AVILA relieved with lasix. Her energy level had vastly improved following ablation. Amiodarone was stopped at that visit (I was concerned it was causing her intermittent AVILA). Stopping Amiodarone improved her symptoms. A 48 hour Holter monitor performed in 11/2017 showed sinus rhythm with an average HR of 58 bpm.     At her 2/2018 visit Ms. Dela Cruz continued to  feel well, with no complaints. However, beginning in early 8/2018, Ms. Dela Cruz began to note significant AVILA, lightheadedness, and dizziness, which temporally correlated with her starting Tramadol. She had been off this medication for a few days. She also cut losartan from 100 mg to 50 mg once daily on her own. At her 9/2018 visit I reviewed recent HR and BP trends, with SBPs frequently in the 80-90s mmHg range. At that visit I stopped losartan. A 48 hour Holter monitor showed sinus rhythm with an average HR of 63 bpm, with a 6.6% PAC burden.     At her visit in 11/2018, Ms. Dela Cruz had an episode of near syncope in [facility], which resulted in a right ankle break for which she required surgery. Otherwise she was feeling much better, with improved breathing and lightheadedness.    At her 1/2020 visit, she described multiple issues with NOACs. She specifically described pruritis on Xarelto, and pruritis on Eliquis. At the time she was on Pradaxa and tolerating it. Ms. 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    Ms. Dela Cruz is tolerating pradaxa, and denies GI bleeding events. She has irregular heartbeats on occasion but not palpitations. She worries about bumps on her legs which she thinks may be due to pradaxa.    Review of Systems   Constitution: Negative for decreased appetite, malaise/fatigue, weight gain and weight loss.   HENT: Negative for sore throat.    Eyes: Negative for blurred vision.   Cardiovascular: Positive for irregular heartbeat. Negative for chest pain, dyspnea on exertion, leg swelling, near-syncope, orthopnea, palpitations, paroxysmal nocturnal dyspnea and syncope.   Respiratory: Negative for shortness of breath.    Skin: Negative for rash.   Musculoskeletal: Negative for arthritis.   Gastrointestinal: Negative for abdominal pain.   Neurological: Negative for focal weakness and light-headedness.    Psychiatric/Behavioral: Negative for altered mental status.        Objective:    Physical Exam   Constitutional: She is oriented to person, place, and time. She appears well-developed and well-nourished. No distress.   HENT:   Head: Normocephalic and atraumatic.   Mouth/Throat: Oropharynx is clear and moist.   Eyes: Pupils are equal, round, and reactive to light. Conjunctivae are normal. No scleral icterus.   Neck: Normal range of motion. Neck supple. No JVD present. No thyromegaly present.   Cardiovascular: Normal rate, regular rhythm, normal heart sounds and intact distal pulses. Exam reveals no gallop and no friction rub.   No murmur heard.  Pulmonary/Chest: Effort normal and breath sounds normal. No respiratory distress. She has no rales.   Abdominal: Soft. Bowel sounds are normal. She exhibits no distension.   Musculoskeletal:         General: No edema.   Neurological: She is alert and oriented to person, place, and time.   Skin: Skin is warm and dry.   Psychiatric: She has a normal mood and affect. Her behavior is normal.   Vitals reviewed.        Assessment:       1. Paroxysmal atrial fibrillation, ablations X 3 1995, 1997, 9/2017, CHADS-VAS score 5, HAS-BLED score 5     2. S/P ablation of atrial flutter    3. Hypertensive left ventricular hypertrophy with heart failure    4. Mixed hyperlipidemia    5. Essential hypertension    6. Second degree AV block, Mobitz type I    7. Anticoagulant long-term use    8. H/O: CVA (cerebrovascular accident), June 2014    9. TIA (transient ischemic attack), 11/3/2014         Plan:   In summary, Ayla Dela Cruz is a 75 year old female with a longstanding history of atrial arrhythmias and prior ablations at outside institutions. Her [id number] Score is 5 (age, female, TIA, HTN) so she should remain on anticoagulation indefinitely. She is now >3 years post-PVI and MA flutter line, and is maintaining sinus rhythm off Amiodarone.     Ms. Dela Cruz had a likely  cardioembolic event after stopping AC for a week. Ms. Dela Cruz wants to be on an anticoagulant she can crush. I have switched her back to eliquis 5 mg bid. She thinks her pruritis was from [employer] laundry detergent and not eliquis.    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 I have told her to contact Dr. Pedraza if she decides she can no longer take anticoagulants so she can discuss details of procedure with him further.    Thank you for allowing me to participate in the care of this patient. Please do not hesitate to call me with any questions or concerns.

## 2020-11-05 ENCOUNTER — TELEPHONE (OUTPATIENT)
Dept: ELECTROPHYSIOLOGY | Facility: CLINIC | Age: 75
End: 2020-11-05

## 2020-11-06 ENCOUNTER — OFFICE VISIT (OUTPATIENT)
Dept: CARDIOLOGY | Facility: CLINIC | Age: 75
End: 2020-11-06
Payer: MEDICARE

## 2020-11-06 VITALS
SYSTOLIC BLOOD PRESSURE: 110 MMHG | OXYGEN SATURATION: 95 % | HEIGHT: 67 IN | HEART RATE: 61 BPM | WEIGHT: 120 LBS | DIASTOLIC BLOOD PRESSURE: 68 MMHG | RESPIRATION RATE: 16 BRPM | BODY MASS INDEX: 18.83 KG/M2

## 2020-11-06 DIAGNOSIS — Z98.890 S/P ABLATION OF ATRIAL FLUTTER: ICD-10-CM

## 2020-11-06 DIAGNOSIS — Z86.73 H/O: CVA (CEREBROVASCULAR ACCIDENT): ICD-10-CM

## 2020-11-06 DIAGNOSIS — Z79.01 ANTICOAGULANT LONG-TERM USE: ICD-10-CM

## 2020-11-06 DIAGNOSIS — I11.0 HYPERTENSIVE LEFT VENTRICULAR HYPERTROPHY WITH HEART FAILURE: ICD-10-CM

## 2020-11-06 DIAGNOSIS — E78.2 MIXED HYPERLIPIDEMIA: ICD-10-CM

## 2020-11-06 DIAGNOSIS — G45.9 TIA (TRANSIENT ISCHEMIC ATTACK): ICD-10-CM

## 2020-11-06 DIAGNOSIS — I44.1 SECOND DEGREE AV BLOCK, MOBITZ TYPE I: ICD-10-CM

## 2020-11-06 DIAGNOSIS — I10 ESSENTIAL HYPERTENSION: ICD-10-CM

## 2020-11-06 DIAGNOSIS — I48.0 PAROXYSMAL ATRIAL FIBRILLATION: Primary | ICD-10-CM

## 2020-11-06 DIAGNOSIS — Z86.79 S/P ABLATION OF ATRIAL FLUTTER: ICD-10-CM

## 2020-11-06 PROCEDURE — 1126F PR PAIN SEVERITY QUANTIFIED, NO PAIN PRESENT: ICD-10-PCS | Mod: S$GLB,,, | Performed by: INTERNAL MEDICINE

## 2020-11-06 PROCEDURE — 3078F DIAST BP <80 MM HG: CPT | Mod: S$GLB,,, | Performed by: INTERNAL MEDICINE

## 2020-11-06 PROCEDURE — 1101F PR PT FALLS ASSESS DOC 0-1 FALLS W/OUT INJ PAST YR: ICD-10-PCS | Mod: S$GLB,,, | Performed by: INTERNAL MEDICINE

## 2020-11-06 PROCEDURE — 99214 PR OFFICE/OUTPT VISIT, EST, LEVL IV, 30-39 MIN: ICD-10-PCS | Mod: S$GLB,,, | Performed by: INTERNAL MEDICINE

## 2020-11-06 PROCEDURE — 3074F SYST BP LT 130 MM HG: CPT | Mod: S$GLB,,, | Performed by: INTERNAL MEDICINE

## 2020-11-06 PROCEDURE — 1101F PT FALLS ASSESS-DOCD LE1/YR: CPT | Mod: S$GLB,,, | Performed by: INTERNAL MEDICINE

## 2020-11-06 PROCEDURE — 99214 OFFICE O/P EST MOD 30 MIN: CPT | Mod: S$GLB,,, | Performed by: INTERNAL MEDICINE

## 2020-11-06 PROCEDURE — 1159F PR MEDICATION LIST DOCUMENTED IN MEDICAL RECORD: ICD-10-PCS | Mod: S$GLB,,, | Performed by: INTERNAL MEDICINE

## 2020-11-06 PROCEDURE — 3074F PR MOST RECENT SYSTOLIC BLOOD PRESSURE < 130 MM HG: ICD-10-PCS | Mod: S$GLB,,, | Performed by: INTERNAL MEDICINE

## 2020-11-06 PROCEDURE — 1159F MED LIST DOCD IN RCRD: CPT | Mod: S$GLB,,, | Performed by: INTERNAL MEDICINE

## 2020-11-06 PROCEDURE — 3078F PR MOST RECENT DIASTOLIC BLOOD PRESSURE < 80 MM HG: ICD-10-PCS | Mod: S$GLB,,, | Performed by: INTERNAL MEDICINE

## 2020-11-06 PROCEDURE — 1126F AMNT PAIN NOTED NONE PRSNT: CPT | Mod: S$GLB,,, | Performed by: INTERNAL MEDICINE

## 2020-11-30 ENCOUNTER — EXTERNAL CHRONIC CARE MANAGEMENT (OUTPATIENT)
Dept: PRIMARY CARE CLINIC | Facility: CLINIC | Age: 75
End: 2020-11-30
Payer: MEDICARE

## 2020-11-30 PROCEDURE — 99490 CHRNC CARE MGMT STAFF 1ST 20: CPT | Mod: S$GLB,,, | Performed by: FAMILY MEDICINE

## 2020-11-30 PROCEDURE — 99490 PR CHRONIC CARE MGMT, 1ST 20 MIN: ICD-10-PCS | Mod: S$GLB,,, | Performed by: FAMILY MEDICINE

## 2020-12-11 ENCOUNTER — PATIENT MESSAGE (OUTPATIENT)
Dept: CARDIOLOGY | Facility: CLINIC | Age: 75
End: 2020-12-11

## 2020-12-13 ENCOUNTER — PATIENT MESSAGE (OUTPATIENT)
Dept: CARDIOLOGY | Facility: CLINIC | Age: 75
End: 2020-12-13

## 2020-12-17 ENCOUNTER — PATIENT MESSAGE (OUTPATIENT)
Dept: FAMILY MEDICINE | Facility: CLINIC | Age: 75
End: 2020-12-17

## 2020-12-18 ENCOUNTER — LAB VISIT (OUTPATIENT)
Dept: PRIMARY CARE CLINIC | Facility: OTHER | Age: 75
End: 2020-12-18
Attending: INTERNAL MEDICINE
Payer: MEDICARE

## 2020-12-18 DIAGNOSIS — Z03.818 ENCOUNTER FOR OBSERVATION FOR SUSPECTED EXPOSURE TO OTHER BIOLOGICAL AGENTS RULED OUT: ICD-10-CM

## 2020-12-18 PROCEDURE — U0003 INFECTIOUS AGENT DETECTION BY NUCLEIC ACID (DNA OR RNA); SEVERE ACUTE RESPIRATORY SYNDROME CORONAVIRUS 2 (SARS-COV-2) (CORONAVIRUS DISEASE [COVID-19]), AMPLIFIED PROBE TECHNIQUE, MAKING USE OF HIGH THROUGHPUT TECHNOLOGIES AS DESCRIBED BY CMS-2020-01-R: HCPCS

## 2020-12-19 LAB — SARS-COV-2 RNA RESP QL NAA+PROBE: NOT DETECTED

## 2020-12-31 ENCOUNTER — EXTERNAL CHRONIC CARE MANAGEMENT (OUTPATIENT)
Dept: PRIMARY CARE CLINIC | Facility: CLINIC | Age: 75
End: 2020-12-31
Payer: MEDICARE

## 2020-12-31 PROCEDURE — 99490 CHRNC CARE MGMT STAFF 1ST 20: CPT | Mod: S$GLB,,, | Performed by: FAMILY MEDICINE

## 2020-12-31 PROCEDURE — 99490 PR CHRONIC CARE MGMT, 1ST 20 MIN: ICD-10-PCS | Mod: S$GLB,,, | Performed by: FAMILY MEDICINE

## 2021-01-04 ENCOUNTER — OFFICE VISIT (OUTPATIENT)
Dept: FAMILY MEDICINE | Facility: CLINIC | Age: 76
End: 2021-01-04
Attending: FAMILY MEDICINE
Payer: MEDICARE

## 2021-01-04 VITALS
WEIGHT: 116.38 LBS | HEART RATE: 83 BPM | DIASTOLIC BLOOD PRESSURE: 60 MMHG | OXYGEN SATURATION: 99 % | SYSTOLIC BLOOD PRESSURE: 118 MMHG | BODY MASS INDEX: 18.27 KG/M2 | TEMPERATURE: 98 F | HEIGHT: 67 IN

## 2021-01-04 DIAGNOSIS — J84.9 INTERSTITIAL LUNG DISEASE: ICD-10-CM

## 2021-01-04 DIAGNOSIS — N28.0 RENAL INFARCT: ICD-10-CM

## 2021-01-04 DIAGNOSIS — Z11.59 NEED FOR HEPATITIS C SCREENING TEST: ICD-10-CM

## 2021-01-04 DIAGNOSIS — F41.1 GAD (GENERALIZED ANXIETY DISORDER): Primary | ICD-10-CM

## 2021-01-04 DIAGNOSIS — M48.8X6 OTHER SPECIFIED SPONDYLOPATHIES, LUMBAR REGION: ICD-10-CM

## 2021-01-04 DIAGNOSIS — Z79.01 ANTICOAGULANT LONG-TERM USE: ICD-10-CM

## 2021-01-04 DIAGNOSIS — F33.41 RECURRENT MAJOR DEPRESSIVE DISORDER, IN PARTIAL REMISSION: ICD-10-CM

## 2021-01-04 DIAGNOSIS — J45.20 MILD INTERMITTENT ASTHMA WITHOUT COMPLICATION: ICD-10-CM

## 2021-01-04 DIAGNOSIS — I50.32 CHRONIC DIASTOLIC HEART FAILURE: ICD-10-CM

## 2021-01-04 DIAGNOSIS — I48.0 PAROXYSMAL ATRIAL FIBRILLATION: ICD-10-CM

## 2021-01-04 DIAGNOSIS — D50.0 IRON DEFICIENCY ANEMIA DUE TO CHRONIC BLOOD LOSS: ICD-10-CM

## 2021-01-04 DIAGNOSIS — K21.9 GASTROESOPHAGEAL REFLUX DISEASE WITHOUT ESOPHAGITIS: ICD-10-CM

## 2021-01-04 DIAGNOSIS — I10 ESSENTIAL HYPERTENSION: ICD-10-CM

## 2021-01-04 DIAGNOSIS — E78.2 MIXED HYPERLIPIDEMIA: ICD-10-CM

## 2021-01-04 PROCEDURE — 99214 PR OFFICE/OUTPT VISIT, EST, LEVL IV, 30-39 MIN: ICD-10-PCS | Mod: S$GLB,,, | Performed by: FAMILY MEDICINE

## 2021-01-04 PROCEDURE — 99999 PR PBB SHADOW E&M-EST. PATIENT-LVL V: CPT | Mod: PBBFAC,,, | Performed by: FAMILY MEDICINE

## 2021-01-04 PROCEDURE — 1157F PR ADVANCE CARE PLAN OR EQUIV PRESENT IN MEDICAL RECORD: ICD-10-PCS | Mod: ,,, | Performed by: FAMILY MEDICINE

## 2021-01-04 PROCEDURE — 99999 PR PBB SHADOW E&M-EST. PATIENT-LVL V: ICD-10-PCS | Mod: PBBFAC,,, | Performed by: FAMILY MEDICINE

## 2021-01-04 PROCEDURE — 1157F ADVNC CARE PLAN IN RCRD: CPT | Mod: ,,, | Performed by: FAMILY MEDICINE

## 2021-01-04 PROCEDURE — 99214 OFFICE O/P EST MOD 30 MIN: CPT | Mod: S$GLB,,, | Performed by: FAMILY MEDICINE

## 2021-01-04 RX ORDER — BREXPIPRAZOLE 0.5 MG/1
1 TABLET ORAL DAILY
COMMUNITY
Start: 2020-12-23 | End: 2021-01-04

## 2021-01-04 RX ORDER — BUSPIRONE HYDROCHLORIDE 5 MG/1
5 TABLET ORAL 2 TIMES DAILY
Qty: 60 TABLET | Refills: 5 | Status: SHIPPED | OUTPATIENT
Start: 2021-01-04 | End: 2021-02-04 | Stop reason: CLARIF

## 2021-01-05 ENCOUNTER — PATIENT MESSAGE (OUTPATIENT)
Dept: FAMILY MEDICINE | Facility: CLINIC | Age: 76
End: 2021-01-05

## 2021-01-05 ENCOUNTER — HOSPITAL ENCOUNTER (EMERGENCY)
Facility: HOSPITAL | Age: 76
Discharge: HOME | End: 2021-01-05
Attending: EMERGENCY MEDICINE
Payer: MEDICARE

## 2021-01-05 VITALS
HEART RATE: 59 BPM | RESPIRATION RATE: 18 BRPM | BODY MASS INDEX: 18.05 KG/M2 | SYSTOLIC BLOOD PRESSURE: 150 MMHG | OXYGEN SATURATION: 99 % | TEMPERATURE: 98 F | HEIGHT: 67 IN | WEIGHT: 115 LBS | DIASTOLIC BLOOD PRESSURE: 62 MMHG

## 2021-01-05 DIAGNOSIS — R42 DIZZINESS: Primary | ICD-10-CM

## 2021-01-05 DIAGNOSIS — R55 SYNCOPE: ICD-10-CM

## 2021-01-05 DIAGNOSIS — S41.111A SKIN TEAR OF UPPER ARM WITHOUT COMPLICATION, RIGHT, INITIAL ENCOUNTER: ICD-10-CM

## 2021-01-05 DIAGNOSIS — S49.90XA ARM INJURY: ICD-10-CM

## 2021-01-05 LAB
ALBUMIN SERPL BCP-MCNC: 3.5 G/DL (ref 3.5–5.2)
ALP SERPL-CCNC: 63 U/L (ref 55–135)
ALT SERPL W/O P-5'-P-CCNC: 15 U/L (ref 10–44)
ANION GAP SERPL CALC-SCNC: 10 MMOL/L (ref 8–16)
AST SERPL-CCNC: 25 U/L (ref 10–40)
BASOPHILS # BLD AUTO: 0.05 K/UL (ref 0–0.2)
BASOPHILS NFR BLD: 0.6 % (ref 0–1.9)
BILIRUB SERPL-MCNC: 0.9 MG/DL (ref 0.1–1)
BILIRUB UR QL STRIP: NEGATIVE
BNP SERPL-MCNC: 122 PG/ML (ref 0–99)
BUN SERPL-MCNC: 19 MG/DL (ref 8–23)
CALCIUM SERPL-MCNC: 9.2 MG/DL (ref 8.7–10.5)
CHLORIDE SERPL-SCNC: 106 MMOL/L (ref 95–110)
CLARITY UR: CLEAR
CO2 SERPL-SCNC: 26 MMOL/L (ref 23–29)
COLOR UR: YELLOW
CREAT SERPL-MCNC: 0.9 MG/DL (ref 0.5–1.4)
DIFFERENTIAL METHOD: ABNORMAL
EOSINOPHIL # BLD AUTO: 0.1 K/UL (ref 0–0.5)
EOSINOPHIL NFR BLD: 1.6 % (ref 0–8)
ERYTHROCYTE [DISTWIDTH] IN BLOOD BY AUTOMATED COUNT: 12.8 % (ref 11.5–14.5)
EST. GFR  (AFRICAN AMERICAN): >60 ML/MIN/1.73 M^2
EST. GFR  (NON AFRICAN AMERICAN): >60 ML/MIN/1.73 M^2
GLUCOSE SERPL-MCNC: 100 MG/DL (ref 70–110)
GLUCOSE UR QL STRIP: NEGATIVE
HCT VFR BLD AUTO: 41.3 % (ref 37–48.5)
HGB BLD-MCNC: 13.2 G/DL (ref 12–16)
HGB UR QL STRIP: NEGATIVE
IMM GRANULOCYTES # BLD AUTO: 0.05 K/UL (ref 0–0.04)
IMM GRANULOCYTES NFR BLD AUTO: 0.6 % (ref 0–0.5)
KETONES UR QL STRIP: NEGATIVE
LEUKOCYTE ESTERASE UR QL STRIP: NEGATIVE
LYMPHOCYTES # BLD AUTO: 0.8 K/UL (ref 1–4.8)
LYMPHOCYTES NFR BLD: 9.5 % (ref 18–48)
MAGNESIUM SERPL-MCNC: 1.8 MG/DL (ref 1.6–2.6)
MCH RBC QN AUTO: 33 PG (ref 27–31)
MCHC RBC AUTO-ENTMCNC: 32 G/DL (ref 32–36)
MCV RBC AUTO: 103 FL (ref 82–98)
MONOCYTES # BLD AUTO: 1.5 K/UL (ref 0.3–1)
MONOCYTES NFR BLD: 17.1 % (ref 4–15)
NEUTROPHILS # BLD AUTO: 6.1 K/UL (ref 1.8–7.7)
NEUTROPHILS NFR BLD: 70.6 % (ref 38–73)
NITRITE UR QL STRIP: NEGATIVE
NRBC BLD-RTO: 0 /100 WBC
PH UR STRIP: 6 [PH] (ref 5–8)
PLATELET # BLD AUTO: 202 K/UL (ref 150–350)
PMV BLD AUTO: 9.1 FL (ref 9.2–12.9)
POTASSIUM SERPL-SCNC: 4.4 MMOL/L (ref 3.5–5.1)
PROT SERPL-MCNC: 6.3 G/DL (ref 6–8.4)
PROT UR QL STRIP: NEGATIVE
RBC # BLD AUTO: 4 M/UL (ref 4–5.4)
SODIUM SERPL-SCNC: 142 MMOL/L (ref 136–145)
SP GR UR STRIP: 1.01 (ref 1–1.03)
TROPONIN I SERPL DL<=0.01 NG/ML-MCNC: <0.006 NG/ML (ref 0–0.03)
URN SPEC COLLECT METH UR: NORMAL
UROBILINOGEN UR STRIP-ACNC: NEGATIVE EU/DL
WBC # BLD AUTO: 8.66 K/UL (ref 3.9–12.7)

## 2021-01-05 PROCEDURE — 99285 EMERGENCY DEPT VISIT HI MDM: CPT | Mod: 25

## 2021-01-05 PROCEDURE — 93010 EKG 12-LEAD: ICD-10-PCS | Mod: ,,, | Performed by: INTERNAL MEDICINE

## 2021-01-05 PROCEDURE — 36415 COLL VENOUS BLD VENIPUNCTURE: CPT

## 2021-01-05 PROCEDURE — 93010 ELECTROCARDIOGRAM REPORT: CPT | Mod: ,,, | Performed by: INTERNAL MEDICINE

## 2021-01-05 PROCEDURE — 85025 COMPLETE CBC W/AUTO DIFF WBC: CPT

## 2021-01-05 PROCEDURE — 83880 ASSAY OF NATRIURETIC PEPTIDE: CPT

## 2021-01-05 PROCEDURE — 84484 ASSAY OF TROPONIN QUANT: CPT

## 2021-01-05 PROCEDURE — 93005 ELECTROCARDIOGRAM TRACING: CPT

## 2021-01-05 PROCEDURE — 81003 URINALYSIS AUTO W/O SCOPE: CPT

## 2021-01-05 PROCEDURE — 80053 COMPREHEN METABOLIC PANEL: CPT

## 2021-01-05 PROCEDURE — 83735 ASSAY OF MAGNESIUM: CPT

## 2021-01-07 ENCOUNTER — PATIENT MESSAGE (OUTPATIENT)
Dept: FAMILY MEDICINE | Facility: CLINIC | Age: 76
End: 2021-01-07

## 2021-01-10 ENCOUNTER — HOSPITAL ENCOUNTER (EMERGENCY)
Facility: HOSPITAL | Age: 76
Discharge: HOME | End: 2021-01-10
Attending: EMERGENCY MEDICINE
Payer: MEDICARE

## 2021-01-10 VITALS
OXYGEN SATURATION: 99 % | WEIGHT: 116 LBS | RESPIRATION RATE: 18 BRPM | BODY MASS INDEX: 18.21 KG/M2 | HEART RATE: 73 BPM | HEIGHT: 67 IN | DIASTOLIC BLOOD PRESSURE: 65 MMHG | SYSTOLIC BLOOD PRESSURE: 135 MMHG

## 2021-01-10 DIAGNOSIS — S09.90XA CLOSED HEAD INJURY, INITIAL ENCOUNTER: Primary | ICD-10-CM

## 2021-01-10 DIAGNOSIS — R55 SYNCOPE: ICD-10-CM

## 2021-01-10 DIAGNOSIS — S00.83XA FACIAL CONTUSION, INITIAL ENCOUNTER: ICD-10-CM

## 2021-01-10 LAB
ANION GAP SERPL CALC-SCNC: 10 MMOL/L (ref 8–16)
BASOPHILS # BLD AUTO: 0.02 K/UL (ref 0–0.2)
BASOPHILS NFR BLD: 0.3 % (ref 0–1.9)
BUN SERPL-MCNC: 19 MG/DL (ref 8–23)
CALCIUM SERPL-MCNC: 8.5 MG/DL (ref 8.7–10.5)
CHLORIDE SERPL-SCNC: 107 MMOL/L (ref 95–110)
CO2 SERPL-SCNC: 25 MMOL/L (ref 23–29)
CREAT SERPL-MCNC: 0.7 MG/DL (ref 0.5–1.4)
DIFFERENTIAL METHOD: ABNORMAL
EOSINOPHIL # BLD AUTO: 0.1 K/UL (ref 0–0.5)
EOSINOPHIL NFR BLD: 1.5 % (ref 0–8)
ERYTHROCYTE [DISTWIDTH] IN BLOOD BY AUTOMATED COUNT: 12.6 % (ref 11.5–14.5)
EST. GFR  (AFRICAN AMERICAN): >60 ML/MIN/1.73 M^2
EST. GFR  (NON AFRICAN AMERICAN): >60 ML/MIN/1.73 M^2
GLUCOSE SERPL-MCNC: 100 MG/DL (ref 70–110)
HCT VFR BLD AUTO: 44.8 % (ref 37–48.5)
HGB BLD-MCNC: 14.2 G/DL (ref 12–16)
IMM GRANULOCYTES # BLD AUTO: 0.03 K/UL (ref 0–0.04)
IMM GRANULOCYTES NFR BLD AUTO: 0.4 % (ref 0–0.5)
LYMPHOCYTES # BLD AUTO: 0.6 K/UL (ref 1–4.8)
LYMPHOCYTES NFR BLD: 7.9 % (ref 18–48)
MCH RBC QN AUTO: 32.2 PG (ref 27–31)
MCHC RBC AUTO-ENTMCNC: 31.7 G/DL (ref 32–36)
MCV RBC AUTO: 102 FL (ref 82–98)
MONOCYTES # BLD AUTO: 0.9 K/UL (ref 0.3–1)
MONOCYTES NFR BLD: 12.5 % (ref 4–15)
NEUTROPHILS # BLD AUTO: 5.7 K/UL (ref 1.8–7.7)
NEUTROPHILS NFR BLD: 77.4 % (ref 38–73)
NRBC BLD-RTO: 0 /100 WBC
PLATELET # BLD AUTO: 192 K/UL (ref 150–350)
PMV BLD AUTO: 10.1 FL (ref 9.2–12.9)
POTASSIUM SERPL-SCNC: 3.8 MMOL/L (ref 3.5–5.1)
RBC # BLD AUTO: 4.41 M/UL (ref 4–5.4)
SODIUM SERPL-SCNC: 142 MMOL/L (ref 136–145)
WBC # BLD AUTO: 7.38 K/UL (ref 3.9–12.7)

## 2021-01-10 PROCEDURE — 93010 EKG 12-LEAD: ICD-10-PCS | Mod: ,,, | Performed by: INTERNAL MEDICINE

## 2021-01-10 PROCEDURE — 99284 EMERGENCY DEPT VISIT MOD MDM: CPT | Mod: 25

## 2021-01-10 PROCEDURE — 93005 ELECTROCARDIOGRAM TRACING: CPT

## 2021-01-10 PROCEDURE — 80048 BASIC METABOLIC PNL TOTAL CA: CPT

## 2021-01-10 PROCEDURE — 25000003 PHARM REV CODE 250: Performed by: EMERGENCY MEDICINE

## 2021-01-10 PROCEDURE — 85025 COMPLETE CBC W/AUTO DIFF WBC: CPT

## 2021-01-10 PROCEDURE — 96361 HYDRATE IV INFUSION ADD-ON: CPT

## 2021-01-10 PROCEDURE — 36415 COLL VENOUS BLD VENIPUNCTURE: CPT

## 2021-01-10 PROCEDURE — 93010 ELECTROCARDIOGRAM REPORT: CPT | Mod: ,,, | Performed by: INTERNAL MEDICINE

## 2021-01-10 PROCEDURE — 96360 HYDRATION IV INFUSION INIT: CPT

## 2021-01-10 RX ADMIN — NEOMYCIN AND POLYMYXIN B SULFATES AND BACITRACIN ZINC 1 EACH: 400; 3.5; 5 OINTMENT TOPICAL at 01:01

## 2021-01-10 RX ADMIN — SODIUM CHLORIDE 1000 ML: 0.9 INJECTION, SOLUTION INTRAVENOUS at 01:01

## 2021-01-12 ENCOUNTER — TELEPHONE (OUTPATIENT)
Dept: ELECTROPHYSIOLOGY | Facility: CLINIC | Age: 76
End: 2021-01-12

## 2021-01-13 ENCOUNTER — LAB VISIT (OUTPATIENT)
Dept: LAB | Facility: HOSPITAL | Age: 76
End: 2021-01-13
Attending: FAMILY MEDICINE
Payer: MEDICARE

## 2021-01-13 DIAGNOSIS — F33.41 RECURRENT MAJOR DEPRESSIVE DISORDER, IN PARTIAL REMISSION: ICD-10-CM

## 2021-01-13 DIAGNOSIS — Z11.59 NEED FOR HEPATITIS C SCREENING TEST: ICD-10-CM

## 2021-01-13 DIAGNOSIS — F41.1 GAD (GENERALIZED ANXIETY DISORDER): ICD-10-CM

## 2021-01-13 DIAGNOSIS — D50.0 IRON DEFICIENCY ANEMIA DUE TO CHRONIC BLOOD LOSS: ICD-10-CM

## 2021-01-13 DIAGNOSIS — I50.32 CHRONIC DIASTOLIC HEART FAILURE: ICD-10-CM

## 2021-01-13 DIAGNOSIS — I10 ESSENTIAL HYPERTENSION: ICD-10-CM

## 2021-01-13 DIAGNOSIS — I48.0 PAROXYSMAL ATRIAL FIBRILLATION: ICD-10-CM

## 2021-01-13 DIAGNOSIS — E78.2 MIXED HYPERLIPIDEMIA: ICD-10-CM

## 2021-01-13 LAB
ALBUMIN SERPL BCP-MCNC: 3.4 G/DL (ref 3.5–5.2)
ALP SERPL-CCNC: 82 U/L (ref 55–135)
ALT SERPL W/O P-5'-P-CCNC: 34 U/L (ref 10–44)
ANION GAP SERPL CALC-SCNC: 12 MMOL/L (ref 8–16)
AST SERPL-CCNC: 48 U/L (ref 10–40)
BASOPHILS # BLD AUTO: 0.02 K/UL (ref 0–0.2)
BASOPHILS NFR BLD: 0.2 % (ref 0–1.9)
BILIRUB SERPL-MCNC: 1.1 MG/DL (ref 0.1–1)
BUN SERPL-MCNC: 14 MG/DL (ref 8–23)
CALCIUM SERPL-MCNC: 8.9 MG/DL (ref 8.7–10.5)
CHLORIDE SERPL-SCNC: 104 MMOL/L (ref 95–110)
CHOLEST SERPL-MCNC: 197 MG/DL (ref 120–199)
CHOLEST/HDLC SERPL: 3.9 {RATIO} (ref 2–5)
CO2 SERPL-SCNC: 25 MMOL/L (ref 23–29)
CREAT SERPL-MCNC: 0.8 MG/DL (ref 0.5–1.4)
DIFFERENTIAL METHOD: ABNORMAL
EOSINOPHIL # BLD AUTO: 0.1 K/UL (ref 0–0.5)
EOSINOPHIL NFR BLD: 1 % (ref 0–8)
ERYTHROCYTE [DISTWIDTH] IN BLOOD BY AUTOMATED COUNT: 12.8 % (ref 11.5–14.5)
EST. GFR  (AFRICAN AMERICAN): >60 ML/MIN/1.73 M^2
EST. GFR  (NON AFRICAN AMERICAN): >60 ML/MIN/1.73 M^2
GLUCOSE SERPL-MCNC: 122 MG/DL (ref 70–110)
HCT VFR BLD AUTO: 46.3 % (ref 37–48.5)
HDLC SERPL-MCNC: 51 MG/DL (ref 40–75)
HDLC SERPL: 25.9 % (ref 20–50)
HGB BLD-MCNC: 14.5 G/DL (ref 12–16)
IMM GRANULOCYTES # BLD AUTO: 0.04 K/UL (ref 0–0.04)
IMM GRANULOCYTES NFR BLD AUTO: 0.5 % (ref 0–0.5)
LDLC SERPL CALC-MCNC: 120.4 MG/DL (ref 63–159)
LYMPHOCYTES # BLD AUTO: 0.8 K/UL (ref 1–4.8)
LYMPHOCYTES NFR BLD: 9.4 % (ref 18–48)
MCH RBC QN AUTO: 32.1 PG (ref 27–31)
MCHC RBC AUTO-ENTMCNC: 31.3 G/DL (ref 32–36)
MCV RBC AUTO: 102 FL (ref 82–98)
MONOCYTES # BLD AUTO: 0.8 K/UL (ref 0.3–1)
MONOCYTES NFR BLD: 9.3 % (ref 4–15)
NEUTROPHILS # BLD AUTO: 6.5 K/UL (ref 1.8–7.7)
NEUTROPHILS NFR BLD: 79.6 % (ref 38–73)
NONHDLC SERPL-MCNC: 146 MG/DL
NRBC BLD-RTO: 0 /100 WBC
PLATELET # BLD AUTO: 250 K/UL (ref 150–350)
PMV BLD AUTO: 9.6 FL (ref 9.2–12.9)
POTASSIUM SERPL-SCNC: 3.9 MMOL/L (ref 3.5–5.1)
PROT SERPL-MCNC: 6.8 G/DL (ref 6–8.4)
RBC # BLD AUTO: 4.52 M/UL (ref 4–5.4)
SODIUM SERPL-SCNC: 141 MMOL/L (ref 136–145)
TRIGL SERPL-MCNC: 128 MG/DL (ref 30–150)
TSH SERPL DL<=0.005 MIU/L-ACNC: 1.03 UIU/ML (ref 0.4–4)
WBC # BLD AUTO: 8.17 K/UL (ref 3.9–12.7)

## 2021-01-13 PROCEDURE — 84443 ASSAY THYROID STIM HORMONE: CPT

## 2021-01-13 PROCEDURE — 86803 HEPATITIS C AB TEST: CPT

## 2021-01-13 PROCEDURE — 85025 COMPLETE CBC W/AUTO DIFF WBC: CPT

## 2021-01-13 PROCEDURE — 80053 COMPREHEN METABOLIC PANEL: CPT

## 2021-01-13 PROCEDURE — 80061 LIPID PANEL: CPT

## 2021-01-13 PROCEDURE — 36415 COLL VENOUS BLD VENIPUNCTURE: CPT | Mod: PO

## 2021-01-13 PROCEDURE — 83880 ASSAY OF NATRIURETIC PEPTIDE: CPT

## 2021-01-14 LAB
BNP SERPL-MCNC: 112 PG/ML (ref 0–99)
HCV AB SERPL QL IA: NEGATIVE

## 2021-01-20 ENCOUNTER — PATIENT MESSAGE (OUTPATIENT)
Dept: FAMILY MEDICINE | Facility: CLINIC | Age: 76
End: 2021-01-20

## 2021-01-20 DIAGNOSIS — F32.1 CURRENT MODERATE EPISODE OF MAJOR DEPRESSIVE DISORDER, UNSPECIFIED WHETHER RECURRENT: Primary | ICD-10-CM

## 2021-01-22 ENCOUNTER — PATIENT MESSAGE (OUTPATIENT)
Dept: ADMINISTRATIVE | Facility: OTHER | Age: 76
End: 2021-01-22

## 2021-01-25 ENCOUNTER — PATIENT MESSAGE (OUTPATIENT)
Dept: CARDIOLOGY | Facility: CLINIC | Age: 76
End: 2021-01-25

## 2021-01-26 ENCOUNTER — OFFICE VISIT (OUTPATIENT)
Dept: PULMONOLOGY | Facility: CLINIC | Age: 76
End: 2021-01-26
Payer: MEDICARE

## 2021-01-26 VITALS
DIASTOLIC BLOOD PRESSURE: 80 MMHG | OXYGEN SATURATION: 98 % | SYSTOLIC BLOOD PRESSURE: 124 MMHG | HEIGHT: 67 IN | BODY MASS INDEX: 17.11 KG/M2 | WEIGHT: 109 LBS | HEART RATE: 79 BPM

## 2021-01-26 DIAGNOSIS — J45.50 SEVERE PERSISTENT ASTHMA WITHOUT COMPLICATION: Primary | ICD-10-CM

## 2021-01-26 PROCEDURE — 99999 PR PBB SHADOW E&M-EST. PATIENT-LVL IV: ICD-10-PCS | Mod: PBBFAC,,, | Performed by: NURSE PRACTITIONER

## 2021-01-26 PROCEDURE — 99999 PR PBB SHADOW E&M-EST. PATIENT-LVL IV: CPT | Mod: PBBFAC,,, | Performed by: NURSE PRACTITIONER

## 2021-01-26 PROCEDURE — 99214 OFFICE O/P EST MOD 30 MIN: CPT | Mod: PBBFAC,PO | Performed by: NURSE PRACTITIONER

## 2021-01-26 PROCEDURE — 1157F ADVNC CARE PLAN IN RCRD: CPT | Mod: S$GLB,ICN,, | Performed by: NURSE PRACTITIONER

## 2021-01-26 PROCEDURE — 1157F PR ADVANCE CARE PLAN OR EQUIV PRESENT IN MEDICAL RECORD: ICD-10-PCS | Mod: S$GLB,ICN,, | Performed by: NURSE PRACTITIONER

## 2021-01-26 PROCEDURE — 99214 PR OFFICE/OUTPT VISIT, EST, LEVL IV, 30-39 MIN: ICD-10-PCS | Mod: S$GLB,ICN,, | Performed by: NURSE PRACTITIONER

## 2021-01-26 PROCEDURE — 99214 OFFICE O/P EST MOD 30 MIN: CPT | Mod: S$GLB,ICN,, | Performed by: NURSE PRACTITIONER

## 2021-01-26 RX ORDER — FLUTICASONE FUROATE, UMECLIDINIUM BROMIDE AND VILANTEROL TRIFENATATE 200; 62.5; 25 UG/1; UG/1; UG/1
1 POWDER RESPIRATORY (INHALATION) DAILY
Qty: 60 EACH | Refills: 11 | Status: SHIPPED | OUTPATIENT
Start: 2021-01-26 | End: 2021-02-04 | Stop reason: CLARIF

## 2021-01-26 RX ORDER — LORAZEPAM 0.5 MG/1
0.5 TABLET ORAL 2 TIMES DAILY PRN
COMMUNITY
Start: 2021-01-11 | End: 2022-12-11 | Stop reason: SDUPTHER

## 2021-01-31 ENCOUNTER — EXTERNAL CHRONIC CARE MANAGEMENT (OUTPATIENT)
Dept: PRIMARY CARE CLINIC | Facility: CLINIC | Age: 76
End: 2021-01-31
Payer: MEDICARE

## 2021-01-31 PROCEDURE — 99439 CHRNC CARE MGMT STAF EA ADDL: CPT | Mod: S$GLB,,, | Performed by: FAMILY MEDICINE

## 2021-01-31 PROCEDURE — 99490 PR CHRONIC CARE MGMT, 1ST 20 MIN: ICD-10-PCS | Mod: S$GLB,,, | Performed by: FAMILY MEDICINE

## 2021-01-31 PROCEDURE — 99490 CHRNC CARE MGMT STAFF 1ST 20: CPT | Mod: S$GLB,,, | Performed by: FAMILY MEDICINE

## 2021-01-31 PROCEDURE — 99439 PR CHRONIC CARE MGMT, EA ADDTL 20 MIN: ICD-10-PCS | Mod: S$GLB,,, | Performed by: FAMILY MEDICINE

## 2021-02-03 ENCOUNTER — IMMUNIZATION (OUTPATIENT)
Dept: PHARMACY | Facility: CLINIC | Age: 76
End: 2021-02-03
Payer: MEDICARE

## 2021-02-03 DIAGNOSIS — Z23 NEED FOR VACCINATION: Primary | ICD-10-CM

## 2021-02-04 ENCOUNTER — HOSPITAL ENCOUNTER (OUTPATIENT)
Facility: HOSPITAL | Age: 76
Discharge: HOME HEALTH | End: 2021-02-07
Attending: EMERGENCY MEDICINE | Admitting: INTERNAL MEDICINE
Payer: MEDICARE

## 2021-02-04 ENCOUNTER — PATIENT MESSAGE (OUTPATIENT)
Dept: CARDIOLOGY | Facility: CLINIC | Age: 76
End: 2021-02-04

## 2021-02-04 DIAGNOSIS — R55 SYNCOPE, UNSPECIFIED SYNCOPE TYPE: ICD-10-CM

## 2021-02-04 DIAGNOSIS — R55 SYNCOPE: ICD-10-CM

## 2021-02-04 DIAGNOSIS — F32.A DEPRESSION, UNSPECIFIED DEPRESSION TYPE: ICD-10-CM

## 2021-02-04 LAB
ALBUMIN SERPL BCP-MCNC: 2.9 G/DL (ref 3.5–5.2)
ALP SERPL-CCNC: 82 U/L (ref 55–135)
ALT SERPL W/O P-5'-P-CCNC: 21 U/L (ref 10–44)
ANION GAP SERPL CALC-SCNC: 9 MMOL/L (ref 8–16)
AST SERPL-CCNC: 24 U/L (ref 10–40)
BACTERIA #/AREA URNS HPF: ABNORMAL /HPF
BASOPHILS # BLD AUTO: 0.06 K/UL (ref 0–0.2)
BASOPHILS NFR BLD: 0.5 % (ref 0–1.9)
BILIRUB SERPL-MCNC: 1.4 MG/DL (ref 0.1–1)
BILIRUB UR QL STRIP: NEGATIVE
BNP SERPL-MCNC: 127 PG/ML (ref 0–99)
BUN SERPL-MCNC: 19 MG/DL (ref 8–23)
CALCIUM SERPL-MCNC: 8.7 MG/DL (ref 8.7–10.5)
CHLORIDE SERPL-SCNC: 104 MMOL/L (ref 95–110)
CK SERPL-CCNC: 34 U/L (ref 20–180)
CLARITY UR: CLEAR
CO2 SERPL-SCNC: 26 MMOL/L (ref 23–29)
COLOR UR: YELLOW
CREAT SERPL-MCNC: 0.8 MG/DL (ref 0.5–1.4)
CRP SERPL-MCNC: 51.6 MG/L (ref 0–8.2)
D DIMER PPP IA.FEU-MCNC: 1.62 MG/L FEU
DIFFERENTIAL METHOD: ABNORMAL
EOSINOPHIL # BLD AUTO: 0.3 K/UL (ref 0–0.5)
EOSINOPHIL NFR BLD: 2.4 % (ref 0–8)
ERYTHROCYTE [DISTWIDTH] IN BLOOD BY AUTOMATED COUNT: 12.7 % (ref 11.5–14.5)
ERYTHROCYTE [SEDIMENTATION RATE] IN BLOOD BY WESTERGREN METHOD: 43 MM/HR (ref 0–20)
EST. GFR  (AFRICAN AMERICAN): >60 ML/MIN/1.73 M^2
EST. GFR  (NON AFRICAN AMERICAN): >60 ML/MIN/1.73 M^2
FERRITIN SERPL-MCNC: 310 NG/ML (ref 20–300)
GLUCOSE SERPL-MCNC: 103 MG/DL (ref 70–110)
GLUCOSE UR QL STRIP: NEGATIVE
HCT VFR BLD AUTO: 41.2 % (ref 37–48.5)
HGB BLD-MCNC: 13.4 G/DL (ref 12–16)
HGB UR QL STRIP: NEGATIVE
HYALINE CASTS #/AREA URNS LPF: 0 /LPF
IMM GRANULOCYTES # BLD AUTO: 0.1 K/UL (ref 0–0.04)
IMM GRANULOCYTES NFR BLD AUTO: 0.8 % (ref 0–0.5)
KETONES UR QL STRIP: ABNORMAL
LACTATE SERPL-SCNC: 1.4 MMOL/L (ref 0.5–2.2)
LDH SERPL L TO P-CCNC: 249 U/L (ref 110–260)
LEUKOCYTE ESTERASE UR QL STRIP: NEGATIVE
LYMPHOCYTES # BLD AUTO: 0.5 K/UL (ref 1–4.8)
LYMPHOCYTES NFR BLD: 4.1 % (ref 18–48)
MAGNESIUM SERPL-MCNC: 1.9 MG/DL (ref 1.6–2.6)
MCH RBC QN AUTO: 31.5 PG (ref 27–31)
MCHC RBC AUTO-ENTMCNC: 32.5 G/DL (ref 32–36)
MCV RBC AUTO: 97 FL (ref 82–98)
MICROSCOPIC COMMENT: ABNORMAL
MONOCYTES # BLD AUTO: 0.9 K/UL (ref 0.3–1)
MONOCYTES NFR BLD: 7.4 % (ref 4–15)
NEUTROPHILS # BLD AUTO: 10.5 K/UL (ref 1.8–7.7)
NEUTROPHILS NFR BLD: 84.8 % (ref 38–73)
NITRITE UR QL STRIP: NEGATIVE
NRBC BLD-RTO: 0 /100 WBC
PH UR STRIP: 7 [PH] (ref 5–8)
PLATELET # BLD AUTO: 225 K/UL (ref 150–350)
PMV BLD AUTO: 9.2 FL (ref 9.2–12.9)
POTASSIUM SERPL-SCNC: 3.8 MMOL/L (ref 3.5–5.1)
PROCALCITONIN SERPL IA-MCNC: 0.11 NG/ML
PROT SERPL-MCNC: 6.1 G/DL (ref 6–8.4)
PROT UR QL STRIP: ABNORMAL
RBC # BLD AUTO: 4.26 M/UL (ref 4–5.4)
RBC #/AREA URNS HPF: 0 /HPF (ref 0–4)
SARS-COV-2 RDRP RESP QL NAA+PROBE: POSITIVE
SODIUM SERPL-SCNC: 139 MMOL/L (ref 136–145)
SP GR UR STRIP: 1.02 (ref 1–1.03)
SQUAMOUS #/AREA URNS HPF: 3 /HPF
TROPONIN I SERPL DL<=0.01 NG/ML-MCNC: 0.01 NG/ML (ref 0–0.03)
TROPONIN I SERPL DL<=0.01 NG/ML-MCNC: 0.02 NG/ML (ref 0–0.03)
URN SPEC COLLECT METH UR: ABNORMAL
UROBILINOGEN UR STRIP-ACNC: ABNORMAL EU/DL
WBC # BLD AUTO: 12.33 K/UL (ref 3.9–12.7)
WBC #/AREA URNS HPF: 5 /HPF (ref 0–5)

## 2021-02-04 PROCEDURE — 85379 FIBRIN DEGRADATION QUANT: CPT

## 2021-02-04 PROCEDURE — 99285 EMERGENCY DEPT VISIT HI MDM: CPT | Mod: 25

## 2021-02-04 PROCEDURE — 83880 ASSAY OF NATRIURETIC PEPTIDE: CPT

## 2021-02-04 PROCEDURE — 96367 TX/PROPH/DG ADDL SEQ IV INF: CPT

## 2021-02-04 PROCEDURE — U0002 COVID-19 LAB TEST NON-CDC: HCPCS

## 2021-02-04 PROCEDURE — 93010 EKG 12-LEAD: ICD-10-PCS | Mod: ,,, | Performed by: INTERNAL MEDICINE

## 2021-02-04 PROCEDURE — 94761 N-INVAS EAR/PLS OXIMETRY MLT: CPT

## 2021-02-04 PROCEDURE — 83615 LACTATE (LD) (LDH) ENZYME: CPT

## 2021-02-04 PROCEDURE — 99203 PR OFFICE/OUTPT VISIT, NEW, LEVL III, 30-44 MIN: ICD-10-PCS | Mod: 95,,, | Performed by: NURSE PRACTITIONER

## 2021-02-04 PROCEDURE — 83735 ASSAY OF MAGNESIUM: CPT

## 2021-02-04 PROCEDURE — 86140 C-REACTIVE PROTEIN: CPT

## 2021-02-04 PROCEDURE — 80053 COMPREHEN METABOLIC PANEL: CPT

## 2021-02-04 PROCEDURE — 93005 ELECTROCARDIOGRAM TRACING: CPT

## 2021-02-04 PROCEDURE — 84484 ASSAY OF TROPONIN QUANT: CPT

## 2021-02-04 PROCEDURE — 82728 ASSAY OF FERRITIN: CPT

## 2021-02-04 PROCEDURE — G0378 HOSPITAL OBSERVATION PER HR: HCPCS

## 2021-02-04 PROCEDURE — 96361 HYDRATE IV INFUSION ADD-ON: CPT

## 2021-02-04 PROCEDURE — 83605 ASSAY OF LACTIC ACID: CPT

## 2021-02-04 PROCEDURE — 85025 COMPLETE CBC W/AUTO DIFF WBC: CPT

## 2021-02-04 PROCEDURE — 25500020 PHARM REV CODE 255: Performed by: INTERNAL MEDICINE

## 2021-02-04 PROCEDURE — 84145 PROCALCITONIN (PCT): CPT

## 2021-02-04 PROCEDURE — 36415 COLL VENOUS BLD VENIPUNCTURE: CPT

## 2021-02-04 PROCEDURE — A9585 GADOBUTROL INJECTION: HCPCS | Performed by: INTERNAL MEDICINE

## 2021-02-04 PROCEDURE — 99203 OFFICE O/P NEW LOW 30 MIN: CPT | Mod: 95,,, | Performed by: NURSE PRACTITIONER

## 2021-02-04 PROCEDURE — 81000 URINALYSIS NONAUTO W/SCOPE: CPT

## 2021-02-04 PROCEDURE — 63600175 PHARM REV CODE 636 W HCPCS: Performed by: EMERGENCY MEDICINE

## 2021-02-04 PROCEDURE — 25000003 PHARM REV CODE 250: Performed by: INTERNAL MEDICINE

## 2021-02-04 PROCEDURE — 93010 ELECTROCARDIOGRAM REPORT: CPT | Mod: ,,, | Performed by: INTERNAL MEDICINE

## 2021-02-04 PROCEDURE — 96365 THER/PROPH/DIAG IV INF INIT: CPT

## 2021-02-04 PROCEDURE — 82550 ASSAY OF CK (CPK): CPT

## 2021-02-04 PROCEDURE — 84484 ASSAY OF TROPONIN QUANT: CPT | Mod: 91

## 2021-02-04 PROCEDURE — 85651 RBC SED RATE NONAUTOMATED: CPT

## 2021-02-04 PROCEDURE — 25000003 PHARM REV CODE 250: Performed by: EMERGENCY MEDICINE

## 2021-02-04 RX ORDER — GADOBUTROL 604.72 MG/ML
5 INJECTION INTRAVENOUS
Status: COMPLETED | OUTPATIENT
Start: 2021-02-04 | End: 2021-02-04

## 2021-02-04 RX ORDER — SODIUM,POTASSIUM PHOSPHATES 280-250MG
2 POWDER IN PACKET (EA) ORAL
Status: DISCONTINUED | OUTPATIENT
Start: 2021-02-04 | End: 2021-02-06

## 2021-02-04 RX ORDER — DEXMETHYLPHENIDATE HYDROCHLORIDE 2.5 MG/1
2.5 TABLET ORAL DAILY
Status: ON HOLD | COMMUNITY
End: 2021-02-07 | Stop reason: HOSPADM

## 2021-02-04 RX ORDER — FLUTICASONE FUROATE AND VILANTEROL 200; 25 UG/1; UG/1
1 POWDER RESPIRATORY (INHALATION) DAILY
Status: DISCONTINUED | OUTPATIENT
Start: 2021-02-05 | End: 2021-02-07 | Stop reason: HOSPADM

## 2021-02-04 RX ORDER — ASCORBIC ACID 500 MG
500 TABLET ORAL 2 TIMES DAILY
Status: DISCONTINUED | OUTPATIENT
Start: 2021-02-04 | End: 2021-02-07 | Stop reason: HOSPADM

## 2021-02-04 RX ORDER — LANOLIN ALCOHOL/MO/W.PET/CERES
800 CREAM (GRAM) TOPICAL
Status: DISCONTINUED | OUTPATIENT
Start: 2021-02-04 | End: 2021-02-06

## 2021-02-04 RX ORDER — ONDANSETRON 2 MG/ML
4 INJECTION INTRAMUSCULAR; INTRAVENOUS EVERY 8 HOURS PRN
Status: DISCONTINUED | OUTPATIENT
Start: 2021-02-04 | End: 2021-02-07 | Stop reason: HOSPADM

## 2021-02-04 RX ORDER — LORAZEPAM 1 MG/1
1 TABLET ORAL 2 TIMES DAILY PRN
Status: DISCONTINUED | OUTPATIENT
Start: 2021-02-04 | End: 2021-02-07 | Stop reason: HOSPADM

## 2021-02-04 RX ORDER — ACETAMINOPHEN 325 MG/1
650 TABLET ORAL EVERY 8 HOURS PRN
Status: DISCONTINUED | OUTPATIENT
Start: 2021-02-04 | End: 2021-02-07 | Stop reason: HOSPADM

## 2021-02-04 RX ORDER — ARIPIPRAZOLE 5 MG/1
10 TABLET ORAL DAILY
Status: DISCONTINUED | OUTPATIENT
Start: 2021-02-05 | End: 2021-02-07 | Stop reason: HOSPADM

## 2021-02-04 RX ORDER — ARIPIPRAZOLE 10 MG/1
10 TABLET ORAL DAILY
Status: ON HOLD | COMMUNITY
End: 2021-02-07 | Stop reason: SDUPTHER

## 2021-02-04 RX ORDER — SODIUM CHLORIDE 0.9 % (FLUSH) 0.9 %
10 SYRINGE (ML) INJECTION
Status: DISCONTINUED | OUTPATIENT
Start: 2021-02-04 | End: 2021-02-07 | Stop reason: HOSPADM

## 2021-02-04 RX ORDER — METOPROLOL TARTRATE 50 MG/1
50 TABLET ORAL 2 TIMES DAILY
Status: DISCONTINUED | OUTPATIENT
Start: 2021-02-04 | End: 2021-02-07 | Stop reason: HOSPADM

## 2021-02-04 RX ORDER — GLUCAGON 1 MG
1 KIT INJECTION
Status: DISCONTINUED | OUTPATIENT
Start: 2021-02-04 | End: 2021-02-07 | Stop reason: HOSPADM

## 2021-02-04 RX ORDER — ACETAMINOPHEN 325 MG/1
650 TABLET ORAL EVERY 4 HOURS PRN
Status: DISCONTINUED | OUTPATIENT
Start: 2021-02-04 | End: 2021-02-07 | Stop reason: HOSPADM

## 2021-02-04 RX ORDER — GUAIFENESIN/DEXTROMETHORPHAN 100-10MG/5
10 SYRUP ORAL EVERY 4 HOURS PRN
Status: DISCONTINUED | OUTPATIENT
Start: 2021-02-04 | End: 2021-02-07 | Stop reason: HOSPADM

## 2021-02-04 RX ORDER — IBUPROFEN 200 MG
24 TABLET ORAL
Status: DISCONTINUED | OUTPATIENT
Start: 2021-02-04 | End: 2021-02-07 | Stop reason: HOSPADM

## 2021-02-04 RX ORDER — CHOLECALCIFEROL (VITAMIN D3) 25 MCG
1000 TABLET ORAL DAILY
Status: DISCONTINUED | OUTPATIENT
Start: 2021-02-05 | End: 2021-02-07 | Stop reason: HOSPADM

## 2021-02-04 RX ORDER — IBUPROFEN 200 MG
16 TABLET ORAL
Status: DISCONTINUED | OUTPATIENT
Start: 2021-02-04 | End: 2021-02-07 | Stop reason: HOSPADM

## 2021-02-04 RX ORDER — FLUTICASONE FUROATE AND VILANTEROL TRIFENATATE 200; 25 UG/1; UG/1
1 POWDER RESPIRATORY (INHALATION) DAILY
Status: ON HOLD | COMMUNITY
End: 2021-05-28

## 2021-02-04 RX ADMIN — CEFTRIAXONE 1 G: 1 INJECTION, SOLUTION INTRAVENOUS at 05:02

## 2021-02-04 RX ADMIN — GADOBUTROL 5 ML: 604.72 INJECTION INTRAVENOUS at 09:02

## 2021-02-04 RX ADMIN — SODIUM CHLORIDE 1000 ML: 0.9 INJECTION, SOLUTION INTRAVENOUS at 12:02

## 2021-02-04 RX ADMIN — OXYCODONE HYDROCHLORIDE AND ACETAMINOPHEN 500 MG: 500 TABLET ORAL at 10:02

## 2021-02-04 RX ADMIN — METOPROLOL TARTRATE 50 MG: 50 TABLET, FILM COATED ORAL at 10:02

## 2021-02-04 RX ADMIN — APIXABAN 5 MG: 2.5 TABLET, FILM COATED ORAL at 10:02

## 2021-02-05 PROBLEM — E46 PROTEIN CALORIE MALNUTRITION: Status: ACTIVE | Noted: 2021-02-05

## 2021-02-05 LAB
ALBUMIN SERPL BCP-MCNC: 2.7 G/DL (ref 3.5–5.2)
ALP SERPL-CCNC: 80 U/L (ref 55–135)
ALT SERPL W/O P-5'-P-CCNC: 18 U/L (ref 10–44)
ANION GAP SERPL CALC-SCNC: 11 MMOL/L (ref 8–16)
AORTIC ROOT ANNULUS: 2.23 CM
AORTIC VALVE CUSP SEPERATION: 1.48 CM
AST SERPL-CCNC: 22 U/L (ref 10–40)
AV INDEX (PROSTH): 1.01
AV MEAN GRADIENT: 3 MMHG
AV PEAK GRADIENT: 6 MMHG
AV VALVE AREA: 3.23 CM2
AV VELOCITY RATIO: 0.93
BASOPHILS # BLD AUTO: 0.08 K/UL (ref 0–0.2)
BASOPHILS NFR BLD: 0.7 % (ref 0–1.9)
BILIRUB SERPL-MCNC: 1.1 MG/DL (ref 0.1–1)
BSA FOR ECHO PROCEDURE: 1.57 M2
BUN SERPL-MCNC: 12 MG/DL (ref 8–23)
CALCIUM SERPL-MCNC: 8.7 MG/DL (ref 8.7–10.5)
CHLORIDE SERPL-SCNC: 106 MMOL/L (ref 95–110)
CO2 SERPL-SCNC: 18 MMOL/L (ref 23–29)
CREAT SERPL-MCNC: 0.7 MG/DL (ref 0.5–1.4)
CV ECHO LV RWT: 0.64 CM
DIFFERENTIAL METHOD: ABNORMAL
DOP CALC AO PEAK VEL: 1.18 M/S
DOP CALC AO VTI: 20.14 CM
DOP CALC LVOT AREA: 3.2 CM2
DOP CALC LVOT DIAMETER: 2.02 CM
DOP CALC LVOT PEAK VEL: 1.1 M/S
DOP CALC LVOT STROKE VOLUME: 65.12 CM3
DOP CALCLVOT PEAK VEL VTI: 20.33 CM
E WAVE DECELERATION TIME: 174.38 MSEC
E/A RATIO: 1.13
ECHO LV POSTERIOR WALL: 1.19 CM (ref 0.6–1.1)
EOSINOPHIL # BLD AUTO: 0.4 K/UL (ref 0–0.5)
EOSINOPHIL NFR BLD: 3.1 % (ref 0–8)
ERYTHROCYTE [DISTWIDTH] IN BLOOD BY AUTOMATED COUNT: 12.8 % (ref 11.5–14.5)
EST. GFR  (AFRICAN AMERICAN): >60 ML/MIN/1.73 M^2
EST. GFR  (NON AFRICAN AMERICAN): >60 ML/MIN/1.73 M^2
FRACTIONAL SHORTENING: 28 % (ref 28–44)
GLUCOSE SERPL-MCNC: 109 MG/DL (ref 70–110)
HCT VFR BLD AUTO: 39 % (ref 37–48.5)
HGB BLD-MCNC: 13 G/DL (ref 12–16)
IMM GRANULOCYTES # BLD AUTO: 0.08 K/UL (ref 0–0.04)
IMM GRANULOCYTES NFR BLD AUTO: 0.7 % (ref 0–0.5)
INTERVENTRICULAR SEPTUM: 1.18 CM (ref 0.6–1.1)
IVRT: 83.73 MSEC
LA MAJOR: 4.7 CM
LA MINOR: 4.48 CM
LA WIDTH: 2.49 CM
LEFT ATRIUM SIZE: 3.79 CM
LEFT ATRIUM VOLUME INDEX MOD: 39.4 ML/M2
LEFT ATRIUM VOLUME INDEX: 23 ML/M2
LEFT ATRIUM VOLUME MOD: 63.09 CM3
LEFT ATRIUM VOLUME: 36.8 CM3
LEFT INTERNAL DIMENSION IN SYSTOLE: 2.69 CM (ref 2.1–4)
LEFT VENTRICLE DIASTOLIC VOLUME INDEX: 37.01 ML/M2
LEFT VENTRICLE DIASTOLIC VOLUME: 59.21 ML
LEFT VENTRICLE MASS INDEX: 91 G/M2
LEFT VENTRICLE SYSTOLIC VOLUME INDEX: 16.8 ML/M2
LEFT VENTRICLE SYSTOLIC VOLUME: 26.88 ML
LEFT VENTRICULAR INTERNAL DIMENSION IN DIASTOLE: 3.73 CM (ref 3.5–6)
LEFT VENTRICULAR MASS: 146.27 G
LYMPHOCYTES # BLD AUTO: 0.7 K/UL (ref 1–4.8)
LYMPHOCYTES NFR BLD: 5.6 % (ref 18–48)
MAGNESIUM SERPL-MCNC: 1.7 MG/DL (ref 1.6–2.6)
MCH RBC QN AUTO: 32 PG (ref 27–31)
MCHC RBC AUTO-ENTMCNC: 33.3 G/DL (ref 32–36)
MCV RBC AUTO: 96 FL (ref 82–98)
MONOCYTES # BLD AUTO: 1.1 K/UL (ref 0.3–1)
MONOCYTES NFR BLD: 9.6 % (ref 4–15)
MV A" WAVE DURATION": 17.7 MSEC
MV PEAK A VEL: 0.71 M/S
MV PEAK E VEL: 0.8 M/S
NEUTROPHILS # BLD AUTO: 9.3 K/UL (ref 1.8–7.7)
NEUTROPHILS NFR BLD: 80.3 % (ref 38–73)
NRBC BLD-RTO: 0 /100 WBC
PHOSPHATE SERPL-MCNC: 2.6 MG/DL (ref 2.7–4.5)
PISA TR MAX VEL: 3.34 M/S
PLATELET # BLD AUTO: 196 K/UL (ref 150–350)
PMV BLD AUTO: 9.7 FL (ref 9.2–12.9)
POCT GLUCOSE: 232 MG/DL (ref 70–110)
POTASSIUM SERPL-SCNC: 3.8 MMOL/L (ref 3.5–5.1)
PROT SERPL-MCNC: 5.9 G/DL (ref 6–8.4)
PV PEAK VELOCITY: 0.88 CM/S
RA MAJOR: 5.12 CM
RA PRESSURE: 8 MMHG
RA WIDTH: 2.72 CM
RBC # BLD AUTO: 4.06 M/UL (ref 4–5.4)
RIGHT VENTRICULAR END-DIASTOLIC DIMENSION: 2.01 CM
SODIUM SERPL-SCNC: 135 MMOL/L (ref 136–145)
TR MAX PG: 45 MMHG
TV REST PULMONARY ARTERY PRESSURE: 53 MMHG
WBC # BLD AUTO: 11.61 K/UL (ref 3.9–12.7)

## 2021-02-05 PROCEDURE — 36415 COLL VENOUS BLD VENIPUNCTURE: CPT

## 2021-02-05 PROCEDURE — 83735 ASSAY OF MAGNESIUM: CPT

## 2021-02-05 PROCEDURE — 85025 COMPLETE CBC W/AUTO DIFF WBC: CPT

## 2021-02-05 PROCEDURE — 95819 EEG AWAKE AND ASLEEP: CPT | Mod: 26,,, | Performed by: PSYCHIATRY & NEUROLOGY

## 2021-02-05 PROCEDURE — 95819 PR EEG,W/AWAKE & ASLEEP RECORD: ICD-10-PCS | Mod: 26,,, | Performed by: PSYCHIATRY & NEUROLOGY

## 2021-02-05 PROCEDURE — G0378 HOSPITAL OBSERVATION PER HR: HCPCS

## 2021-02-05 PROCEDURE — 25000242 PHARM REV CODE 250 ALT 637 W/ HCPCS: Performed by: INTERNAL MEDICINE

## 2021-02-05 PROCEDURE — 25000003 PHARM REV CODE 250: Performed by: INTERNAL MEDICINE

## 2021-02-05 PROCEDURE — 95819 EEG AWAKE AND ASLEEP: CPT

## 2021-02-05 PROCEDURE — 84100 ASSAY OF PHOSPHORUS: CPT

## 2021-02-05 PROCEDURE — 97802 MEDICAL NUTRITION INDIV IN: CPT

## 2021-02-05 PROCEDURE — 94640 AIRWAY INHALATION TREATMENT: CPT

## 2021-02-05 PROCEDURE — 63600175 PHARM REV CODE 636 W HCPCS: Performed by: INTERNAL MEDICINE

## 2021-02-05 PROCEDURE — 80053 COMPREHEN METABOLIC PANEL: CPT

## 2021-02-05 PROCEDURE — 94761 N-INVAS EAR/PLS OXIMETRY MLT: CPT

## 2021-02-05 PROCEDURE — 25000003 PHARM REV CODE 250: Performed by: NURSE PRACTITIONER

## 2021-02-05 PROCEDURE — 96376 TX/PRO/DX INJ SAME DRUG ADON: CPT | Mod: 59

## 2021-02-05 RX ADMIN — APIXABAN 5 MG: 2.5 TABLET, FILM COATED ORAL at 09:02

## 2021-02-05 RX ADMIN — OXYCODONE HYDROCHLORIDE AND ACETAMINOPHEN 500 MG: 500 TABLET ORAL at 08:02

## 2021-02-05 RX ADMIN — METOPROLOL TARTRATE 50 MG: 50 TABLET, FILM COATED ORAL at 09:02

## 2021-02-05 RX ADMIN — DEXAMETHASONE 6 MG: 2 TABLET ORAL at 09:02

## 2021-02-05 RX ADMIN — APIXABAN 5 MG: 2.5 TABLET, FILM COATED ORAL at 08:02

## 2021-02-05 RX ADMIN — POTASSIUM & SODIUM PHOSPHATES POWDER PACK 280-160-250 MG 2 PACKET: 280-160-250 PACK at 01:02

## 2021-02-05 RX ADMIN — LORAZEPAM 1 MG: 1 TABLET ORAL at 12:02

## 2021-02-05 RX ADMIN — POTASSIUM & SODIUM PHOSPHATES POWDER PACK 280-160-250 MG 2 PACKET: 280-160-250 PACK at 09:02

## 2021-02-05 RX ADMIN — THERA TABS 1 TABLET: TAB at 09:02

## 2021-02-05 RX ADMIN — PRAMOXINE HYDROCHLORIDE HYDROCORTISONE ACETATE 1 APPLICATOR: 100; 100 AEROSOL, FOAM TOPICAL at 05:02

## 2021-02-05 RX ADMIN — OXYCODONE HYDROCHLORIDE AND ACETAMINOPHEN 500 MG: 500 TABLET ORAL at 09:02

## 2021-02-05 RX ADMIN — FLUTICASONE FUROATE AND VILANTEROL TRIFENATATE 1 PUFF: 200; 25 POWDER RESPIRATORY (INHALATION) at 07:02

## 2021-02-05 RX ADMIN — CEFTRIAXONE 1 G: 1 INJECTION, SOLUTION INTRAVENOUS at 05:02

## 2021-02-05 RX ADMIN — ARIPIPRAZOLE 10 MG: 5 TABLET ORAL at 09:02

## 2021-02-05 RX ADMIN — Medication 1000 UNITS: at 09:02

## 2021-02-06 LAB
ALBUMIN SERPL BCP-MCNC: 2.9 G/DL (ref 3.5–5.2)
ALP SERPL-CCNC: 81 U/L (ref 55–135)
ALT SERPL W/O P-5'-P-CCNC: 22 U/L (ref 10–44)
ANION GAP SERPL CALC-SCNC: 12 MMOL/L (ref 8–16)
AST SERPL-CCNC: 26 U/L (ref 10–40)
BASOPHILS # BLD AUTO: 0.03 K/UL (ref 0–0.2)
BASOPHILS NFR BLD: 0.3 % (ref 0–1.9)
BILIRUB SERPL-MCNC: 0.9 MG/DL (ref 0.1–1)
BUN SERPL-MCNC: 12 MG/DL (ref 8–23)
CALCIUM SERPL-MCNC: 9.3 MG/DL (ref 8.7–10.5)
CHLORIDE SERPL-SCNC: 104 MMOL/L (ref 95–110)
CO2 SERPL-SCNC: 22 MMOL/L (ref 23–29)
CREAT SERPL-MCNC: 0.6 MG/DL (ref 0.5–1.4)
DIFFERENTIAL METHOD: ABNORMAL
EOSINOPHIL # BLD AUTO: 0 K/UL (ref 0–0.5)
EOSINOPHIL NFR BLD: 0.3 % (ref 0–8)
ERYTHROCYTE [DISTWIDTH] IN BLOOD BY AUTOMATED COUNT: 12.8 % (ref 11.5–14.5)
EST. GFR  (AFRICAN AMERICAN): >60 ML/MIN/1.73 M^2
EST. GFR  (NON AFRICAN AMERICAN): >60 ML/MIN/1.73 M^2
ESTIMATED AVG GLUCOSE: 131 MG/DL (ref 68–131)
GLUCOSE SERPL-MCNC: 108 MG/DL (ref 70–110)
HBA1C MFR BLD: 6.2 % (ref 4–5.6)
HCT VFR BLD AUTO: 40 % (ref 37–48.5)
HGB BLD-MCNC: 13.1 G/DL (ref 12–16)
IMM GRANULOCYTES # BLD AUTO: 0.11 K/UL (ref 0–0.04)
IMM GRANULOCYTES NFR BLD AUTO: 1 % (ref 0–0.5)
LYMPHOCYTES # BLD AUTO: 0.5 K/UL (ref 1–4.8)
LYMPHOCYTES NFR BLD: 4.8 % (ref 18–48)
MAGNESIUM SERPL-MCNC: 2 MG/DL (ref 1.6–2.6)
MCH RBC QN AUTO: 31.6 PG (ref 27–31)
MCHC RBC AUTO-ENTMCNC: 32.8 G/DL (ref 32–36)
MCV RBC AUTO: 97 FL (ref 82–98)
MONOCYTES # BLD AUTO: 0.9 K/UL (ref 0.3–1)
MONOCYTES NFR BLD: 8 % (ref 4–15)
NEUTROPHILS # BLD AUTO: 9.2 K/UL (ref 1.8–7.7)
NEUTROPHILS NFR BLD: 85.6 % (ref 38–73)
NRBC BLD-RTO: 0 /100 WBC
PHOSPHATE SERPL-MCNC: 2.7 MG/DL (ref 2.7–4.5)
PLATELET # BLD AUTO: 240 K/UL (ref 150–350)
PMV BLD AUTO: 9.4 FL (ref 9.2–12.9)
POTASSIUM SERPL-SCNC: 4.2 MMOL/L (ref 3.5–5.1)
PROT SERPL-MCNC: 6.4 G/DL (ref 6–8.4)
RBC # BLD AUTO: 4.14 M/UL (ref 4–5.4)
SODIUM SERPL-SCNC: 138 MMOL/L (ref 136–145)
WBC # BLD AUTO: 10.69 K/UL (ref 3.9–12.7)

## 2021-02-06 PROCEDURE — 25000003 PHARM REV CODE 250: Performed by: INTERNAL MEDICINE

## 2021-02-06 PROCEDURE — 63600175 PHARM REV CODE 636 W HCPCS: Performed by: INTERNAL MEDICINE

## 2021-02-06 PROCEDURE — 25000242 PHARM REV CODE 250 ALT 637 W/ HCPCS: Performed by: INTERNAL MEDICINE

## 2021-02-06 PROCEDURE — 25000003 PHARM REV CODE 250: Performed by: HOSPITALIST

## 2021-02-06 PROCEDURE — 99226 PR SUBSEQUENT OBSERVATION CARE,LEVEL III: CPT | Mod: ,,, | Performed by: HOSPITALIST

## 2021-02-06 PROCEDURE — 96374 THER/PROPH/DIAG INJ IV PUSH: CPT | Mod: 59

## 2021-02-06 PROCEDURE — 97116 GAIT TRAINING THERAPY: CPT

## 2021-02-06 PROCEDURE — 99226 PR SUBSEQUENT OBSERVATION CARE,LEVEL III: ICD-10-PCS | Mod: ,,, | Performed by: HOSPITALIST

## 2021-02-06 PROCEDURE — 83735 ASSAY OF MAGNESIUM: CPT

## 2021-02-06 PROCEDURE — 97162 PT EVAL MOD COMPLEX 30 MIN: CPT

## 2021-02-06 PROCEDURE — 94761 N-INVAS EAR/PLS OXIMETRY MLT: CPT

## 2021-02-06 PROCEDURE — G0378 HOSPITAL OBSERVATION PER HR: HCPCS

## 2021-02-06 PROCEDURE — 84100 ASSAY OF PHOSPHORUS: CPT

## 2021-02-06 PROCEDURE — 36415 COLL VENOUS BLD VENIPUNCTURE: CPT

## 2021-02-06 PROCEDURE — 83036 HEMOGLOBIN GLYCOSYLATED A1C: CPT

## 2021-02-06 PROCEDURE — 94640 AIRWAY INHALATION TREATMENT: CPT

## 2021-02-06 PROCEDURE — 85025 COMPLETE CBC W/AUTO DIFF WBC: CPT

## 2021-02-06 PROCEDURE — 80053 COMPREHEN METABOLIC PANEL: CPT

## 2021-02-06 RX ORDER — TALC
6 POWDER (GRAM) TOPICAL NIGHTLY PRN
Status: DISCONTINUED | OUTPATIENT
Start: 2021-02-06 | End: 2021-02-07 | Stop reason: HOSPADM

## 2021-02-06 RX ORDER — HYDROCORTISONE 25 MG/G
CREAM TOPICAL 2 TIMES DAILY
Status: DISCONTINUED | OUTPATIENT
Start: 2021-02-06 | End: 2021-02-07 | Stop reason: HOSPADM

## 2021-02-06 RX ORDER — SODIUM CHLORIDE 9 MG/ML
INJECTION, SOLUTION INTRAVENOUS CONTINUOUS
Status: ACTIVE | OUTPATIENT
Start: 2021-02-06 | End: 2021-02-07

## 2021-02-06 RX ADMIN — APIXABAN 5 MG: 2.5 TABLET, FILM COATED ORAL at 08:02

## 2021-02-06 RX ADMIN — Medication 1000 UNITS: at 08:02

## 2021-02-06 RX ADMIN — METOPROLOL TARTRATE 50 MG: 50 TABLET, FILM COATED ORAL at 08:02

## 2021-02-06 RX ADMIN — OXYCODONE HYDROCHLORIDE AND ACETAMINOPHEN 500 MG: 500 TABLET ORAL at 08:02

## 2021-02-06 RX ADMIN — MELATONIN TAB 3 MG 6 MG: 3 TAB at 08:02

## 2021-02-06 RX ADMIN — SODIUM CHLORIDE: 0.9 INJECTION, SOLUTION INTRAVENOUS at 10:02

## 2021-02-06 RX ADMIN — HYDROCORTISONE: 25 CREAM TOPICAL at 08:02

## 2021-02-06 RX ADMIN — DEXAMETHASONE 6 MG: 2 TABLET ORAL at 08:02

## 2021-02-06 RX ADMIN — ARIPIPRAZOLE 10 MG: 5 TABLET ORAL at 08:02

## 2021-02-06 RX ADMIN — HYDROCORTISONE: 25 CREAM TOPICAL at 10:02

## 2021-02-06 RX ADMIN — CEFTRIAXONE 1 G: 1 INJECTION, SOLUTION INTRAVENOUS at 04:02

## 2021-02-06 RX ADMIN — THERA TABS 1 TABLET: TAB at 08:02

## 2021-02-06 RX ADMIN — FLUTICASONE FUROATE AND VILANTEROL TRIFENATATE 1 PUFF: 200; 25 POWDER RESPIRATORY (INHALATION) at 07:02

## 2021-02-07 VITALS
BODY MASS INDEX: 18.06 KG/M2 | TEMPERATURE: 97 F | HEIGHT: 67 IN | RESPIRATION RATE: 18 BRPM | WEIGHT: 115.06 LBS | SYSTOLIC BLOOD PRESSURE: 139 MMHG | DIASTOLIC BLOOD PRESSURE: 65 MMHG | OXYGEN SATURATION: 100 % | HEART RATE: 67 BPM

## 2021-02-07 LAB
ALBUMIN SERPL BCP-MCNC: 2.6 G/DL (ref 3.5–5.2)
ALP SERPL-CCNC: 68 U/L (ref 55–135)
ALT SERPL W/O P-5'-P-CCNC: 27 U/L (ref 10–44)
ANION GAP SERPL CALC-SCNC: 9 MMOL/L (ref 8–16)
AST SERPL-CCNC: 32 U/L (ref 10–40)
BASOPHILS # BLD AUTO: 0.03 K/UL (ref 0–0.2)
BASOPHILS NFR BLD: 0.2 % (ref 0–1.9)
BILIRUB SERPL-MCNC: 0.4 MG/DL (ref 0.1–1)
BUN SERPL-MCNC: 14 MG/DL (ref 8–23)
CALCIUM SERPL-MCNC: 8.8 MG/DL (ref 8.7–10.5)
CHLORIDE SERPL-SCNC: 106 MMOL/L (ref 95–110)
CO2 SERPL-SCNC: 23 MMOL/L (ref 23–29)
CREAT SERPL-MCNC: 0.6 MG/DL (ref 0.5–1.4)
CRP SERPL-MCNC: 23.3 MG/L (ref 0–8.2)
D DIMER PPP IA.FEU-MCNC: 2.25 MG/L FEU
DIFFERENTIAL METHOD: ABNORMAL
EOSINOPHIL # BLD AUTO: 0 K/UL (ref 0–0.5)
EOSINOPHIL NFR BLD: 0.1 % (ref 0–8)
ERYTHROCYTE [DISTWIDTH] IN BLOOD BY AUTOMATED COUNT: 13 % (ref 11.5–14.5)
EST. GFR  (AFRICAN AMERICAN): >60 ML/MIN/1.73 M^2
EST. GFR  (NON AFRICAN AMERICAN): >60 ML/MIN/1.73 M^2
FERRITIN SERPL-MCNC: 298 NG/ML (ref 20–300)
GLUCOSE SERPL-MCNC: 100 MG/DL (ref 70–110)
HCT VFR BLD AUTO: 38.9 % (ref 37–48.5)
HGB BLD-MCNC: 12.3 G/DL (ref 12–16)
IMM GRANULOCYTES # BLD AUTO: 0.14 K/UL (ref 0–0.04)
IMM GRANULOCYTES NFR BLD AUTO: 1.1 % (ref 0–0.5)
LDH SERPL L TO P-CCNC: 258 U/L (ref 110–260)
LYMPHOCYTES # BLD AUTO: 0.5 K/UL (ref 1–4.8)
LYMPHOCYTES NFR BLD: 4.2 % (ref 18–48)
MAGNESIUM SERPL-MCNC: 2 MG/DL (ref 1.6–2.6)
MCH RBC QN AUTO: 30.8 PG (ref 27–31)
MCHC RBC AUTO-ENTMCNC: 31.6 G/DL (ref 32–36)
MCV RBC AUTO: 98 FL (ref 82–98)
MONOCYTES # BLD AUTO: 1 K/UL (ref 0.3–1)
MONOCYTES NFR BLD: 7.6 % (ref 4–15)
NEUTROPHILS # BLD AUTO: 11.3 K/UL (ref 1.8–7.7)
NEUTROPHILS NFR BLD: 86.8 % (ref 38–73)
NRBC BLD-RTO: 0 /100 WBC
PHOSPHATE SERPL-MCNC: 3.2 MG/DL (ref 2.7–4.5)
PLATELET # BLD AUTO: 268 K/UL (ref 150–350)
PMV BLD AUTO: 9.3 FL (ref 9.2–12.9)
POTASSIUM SERPL-SCNC: 4.6 MMOL/L (ref 3.5–5.1)
PROT SERPL-MCNC: 5.7 G/DL (ref 6–8.4)
RBC # BLD AUTO: 3.99 M/UL (ref 4–5.4)
SODIUM SERPL-SCNC: 138 MMOL/L (ref 136–145)
WBC # BLD AUTO: 12.96 K/UL (ref 3.9–12.7)

## 2021-02-07 PROCEDURE — 63600175 PHARM REV CODE 636 W HCPCS: Performed by: INTERNAL MEDICINE

## 2021-02-07 PROCEDURE — 85379 FIBRIN DEGRADATION QUANT: CPT

## 2021-02-07 PROCEDURE — 82728 ASSAY OF FERRITIN: CPT

## 2021-02-07 PROCEDURE — 25000242 PHARM REV CODE 250 ALT 637 W/ HCPCS: Performed by: INTERNAL MEDICINE

## 2021-02-07 PROCEDURE — 85025 COMPLETE CBC W/AUTO DIFF WBC: CPT

## 2021-02-07 PROCEDURE — 36415 COLL VENOUS BLD VENIPUNCTURE: CPT

## 2021-02-07 PROCEDURE — 84100 ASSAY OF PHOSPHORUS: CPT

## 2021-02-07 PROCEDURE — 99217 PR OBSERVATION CARE DISCHARGE: ICD-10-PCS | Mod: ,,, | Performed by: HOSPITALIST

## 2021-02-07 PROCEDURE — 86140 C-REACTIVE PROTEIN: CPT

## 2021-02-07 PROCEDURE — 94761 N-INVAS EAR/PLS OXIMETRY MLT: CPT

## 2021-02-07 PROCEDURE — G0378 HOSPITAL OBSERVATION PER HR: HCPCS

## 2021-02-07 PROCEDURE — 99217 PR OBSERVATION CARE DISCHARGE: CPT | Mod: ,,, | Performed by: HOSPITALIST

## 2021-02-07 PROCEDURE — 80053 COMPREHEN METABOLIC PANEL: CPT

## 2021-02-07 PROCEDURE — 83735 ASSAY OF MAGNESIUM: CPT

## 2021-02-07 PROCEDURE — 25000003 PHARM REV CODE 250: Performed by: INTERNAL MEDICINE

## 2021-02-07 PROCEDURE — 83615 LACTATE (LD) (LDH) ENZYME: CPT

## 2021-02-07 PROCEDURE — 94640 AIRWAY INHALATION TREATMENT: CPT

## 2021-02-07 RX ORDER — ARIPIPRAZOLE 10 MG/1
10 TABLET ORAL DAILY
Qty: 30 TABLET | Refills: 0 | Status: SHIPPED | OUTPATIENT
Start: 2021-02-07 | End: 2021-09-10 | Stop reason: CLARIF

## 2021-02-07 RX ADMIN — OXYCODONE HYDROCHLORIDE AND ACETAMINOPHEN 500 MG: 500 TABLET ORAL at 09:02

## 2021-02-07 RX ADMIN — THERA TABS 1 TABLET: TAB at 09:02

## 2021-02-07 RX ADMIN — FLUTICASONE FUROATE AND VILANTEROL TRIFENATATE 1 PUFF: 200; 25 POWDER RESPIRATORY (INHALATION) at 08:02

## 2021-02-07 RX ADMIN — Medication 1000 UNITS: at 09:02

## 2021-02-07 RX ADMIN — ARIPIPRAZOLE 10 MG: 5 TABLET ORAL at 09:02

## 2021-02-07 RX ADMIN — METOPROLOL TARTRATE 50 MG: 50 TABLET, FILM COATED ORAL at 09:02

## 2021-02-07 RX ADMIN — APIXABAN 5 MG: 2.5 TABLET, FILM COATED ORAL at 09:02

## 2021-02-07 RX ADMIN — DEXAMETHASONE 6 MG: 2 TABLET ORAL at 09:02

## 2021-02-08 ENCOUNTER — TELEPHONE (OUTPATIENT)
Dept: ADMINISTRATIVE | Facility: CLINIC | Age: 76
End: 2021-02-08

## 2021-02-08 ENCOUNTER — TELEPHONE (OUTPATIENT)
Dept: MEDSURG UNIT | Facility: HOSPITAL | Age: 76
End: 2021-02-08

## 2021-02-09 ENCOUNTER — PATIENT MESSAGE (OUTPATIENT)
Dept: ELECTROPHYSIOLOGY | Facility: CLINIC | Age: 76
End: 2021-02-09

## 2021-02-14 ENCOUNTER — PATIENT MESSAGE (OUTPATIENT)
Dept: FAMILY MEDICINE | Facility: CLINIC | Age: 76
End: 2021-02-14

## 2021-02-15 ENCOUNTER — EXTERNAL HOME HEALTH (OUTPATIENT)
Dept: HOME HEALTH SERVICES | Facility: HOSPITAL | Age: 76
End: 2021-02-15
Payer: MEDICARE

## 2021-02-15 ENCOUNTER — PATIENT MESSAGE (OUTPATIENT)
Dept: FAMILY MEDICINE | Facility: CLINIC | Age: 76
End: 2021-02-15

## 2021-02-15 PROCEDURE — G0180 MD CERTIFICATION HHA PATIENT: HCPCS | Mod: ,,, | Performed by: FAMILY MEDICINE

## 2021-02-15 PROCEDURE — G0180 PR HOME HEALTH MD CERTIFICATION: ICD-10-PCS | Mod: ,,, | Performed by: FAMILY MEDICINE

## 2021-02-22 ENCOUNTER — OFFICE VISIT (OUTPATIENT)
Dept: FAMILY MEDICINE | Facility: CLINIC | Age: 76
End: 2021-02-22
Attending: FAMILY MEDICINE
Payer: MEDICARE

## 2021-02-22 VITALS
BODY MASS INDEX: 16.27 KG/M2 | OXYGEN SATURATION: 97 % | HEIGHT: 67 IN | TEMPERATURE: 97 F | DIASTOLIC BLOOD PRESSURE: 70 MMHG | WEIGHT: 103.63 LBS | SYSTOLIC BLOOD PRESSURE: 118 MMHG | HEART RATE: 80 BPM

## 2021-02-22 DIAGNOSIS — I10 ESSENTIAL HYPERTENSION: ICD-10-CM

## 2021-02-22 DIAGNOSIS — Z78.9 MEDICATION INTOLERANCE: ICD-10-CM

## 2021-02-22 DIAGNOSIS — R63.6 UNDERWEIGHT: ICD-10-CM

## 2021-02-22 DIAGNOSIS — F41.1 GAD (GENERALIZED ANXIETY DISORDER): ICD-10-CM

## 2021-02-22 DIAGNOSIS — R55 SYNCOPE, UNSPECIFIED SYNCOPE TYPE: Primary | ICD-10-CM

## 2021-02-22 DIAGNOSIS — F33.41 RECURRENT MAJOR DEPRESSIVE DISORDER, IN PARTIAL REMISSION: ICD-10-CM

## 2021-02-22 DIAGNOSIS — I50.32 CHRONIC DIASTOLIC HEART FAILURE: ICD-10-CM

## 2021-02-22 PROCEDURE — 99214 OFFICE O/P EST MOD 30 MIN: CPT | Mod: S$GLB,,, | Performed by: FAMILY MEDICINE

## 2021-02-22 PROCEDURE — 99214 PR OFFICE/OUTPT VISIT, EST, LEVL IV, 30-39 MIN: ICD-10-PCS | Mod: S$GLB,,, | Performed by: FAMILY MEDICINE

## 2021-02-22 PROCEDURE — 1157F PR ADVANCE CARE PLAN OR EQUIV PRESENT IN MEDICAL RECORD: ICD-10-PCS | Mod: S$GLB,,, | Performed by: FAMILY MEDICINE

## 2021-02-22 PROCEDURE — 1157F ADVNC CARE PLAN IN RCRD: CPT | Mod: S$GLB,,, | Performed by: FAMILY MEDICINE

## 2021-02-22 PROCEDURE — 99999 PR PBB SHADOW E&M-EST. PATIENT-LVL IV: CPT | Mod: PBBFAC,,, | Performed by: FAMILY MEDICINE

## 2021-02-22 PROCEDURE — 99214 OFFICE O/P EST MOD 30 MIN: CPT | Mod: PBBFAC,PO | Performed by: FAMILY MEDICINE

## 2021-02-22 PROCEDURE — 99999 PR PBB SHADOW E&M-EST. PATIENT-LVL IV: ICD-10-PCS | Mod: PBBFAC,,, | Performed by: FAMILY MEDICINE

## 2021-02-22 RX ORDER — METRONIDAZOLE 7.5 MG/G
LOTION TOPICAL
COMMUNITY
Start: 2021-02-01 | End: 2021-09-10 | Stop reason: CLARIF

## 2021-02-25 NOTE — OP NOTE
.PROCEDURE DATE: 6/23/2020    PROCEDURE:  Radiofrequency ablation of the C4,5,6 medial branch nerves on the right-side under fluoroscopy    DIAGNOSIS:  Other Cervical spondylosis  Post Op Diagnosis: Same    PHYSICIAN: Galo Clancy MD    MEDICATIONS INJECTED:  From a mixture of 6ml of 0.25%bupivicaine and 80mg of methylprednisone, 1ml of this solution was injected at each level.    LOCAL ANESTHETIC USED:   1ml of lidocaine 1% at each level    SEDATION MEDICATIONS: RN IV sedation    ESTIMATED BLOOD LOSS:  None    COMPLICATIONS:  None    TECHNIQUE:   A time-out taken to identify patient and procedure side prior to starting the procedure.  The patient was positioned in the prone position. The patient was prepped and draped in the usual sterile fashion using ChloraPrep and sterile towels.  The levels were determined under fluoroscopic guidance using a slightly posteriorly oblique view.   Local anesthetic was infiltrated superficially at the skin.  Then a 100 mm 20g bent tip RF needle was inserted to the anatomic location of the midsection of the lateral masses of C4,5,6 on the right side(s). A cross table view was then taken to ensure that needles did not cross into neural foramina.  Impedance was less than 800 ohms at each level.  Motor stimulation up to 2 volts confirmed there was no nerve root involvement at each level. Medication was then injected slowly.  Ablation was done per level utilizing radiofrequency generator at 80°C for 60 seconds. The patient tolerated the procedure well.     The patient was monitored after the procedure.  Patient was given post procedure and discharge instructions to follow at home.  The patient was discharged in a stable condition     Yes

## 2021-02-28 ENCOUNTER — EXTERNAL CHRONIC CARE MANAGEMENT (OUTPATIENT)
Dept: PRIMARY CARE CLINIC | Facility: CLINIC | Age: 76
End: 2021-02-28
Payer: MEDICARE

## 2021-02-28 PROCEDURE — 99439 CHRNC CARE MGMT STAF EA ADDL: CPT | Mod: PBBFAC,PO | Performed by: FAMILY MEDICINE

## 2021-02-28 PROCEDURE — 99439 CHRNC CARE MGMT STAF EA ADDL: CPT | Mod: S$GLB,,, | Performed by: FAMILY MEDICINE

## 2021-02-28 PROCEDURE — 99490 PR CHRONIC CARE MGMT, 1ST 20 MIN: ICD-10-PCS | Mod: S$GLB,,, | Performed by: FAMILY MEDICINE

## 2021-02-28 PROCEDURE — 99490 CHRNC CARE MGMT STAFF 1ST 20: CPT | Mod: S$GLB,,, | Performed by: FAMILY MEDICINE

## 2021-02-28 PROCEDURE — 99490 CHRNC CARE MGMT STAFF 1ST 20: CPT | Mod: PBBFAC,PO | Performed by: FAMILY MEDICINE

## 2021-02-28 PROCEDURE — 99439 PR CHRONIC CARE MGMT, EA ADDTL 20 MIN: ICD-10-PCS | Mod: S$GLB,,, | Performed by: FAMILY MEDICINE

## 2021-03-02 ENCOUNTER — TELEPHONE (OUTPATIENT)
Dept: FAMILY MEDICINE | Facility: CLINIC | Age: 76
End: 2021-03-02

## 2021-03-03 ENCOUNTER — IMMUNIZATION (OUTPATIENT)
Dept: PHARMACY | Facility: CLINIC | Age: 76
End: 2021-03-03
Payer: MEDICARE

## 2021-03-03 DIAGNOSIS — Z23 NEED FOR VACCINATION: Primary | ICD-10-CM

## 2021-03-04 ENCOUNTER — DOCUMENT SCAN (OUTPATIENT)
Dept: HOME HEALTH SERVICES | Facility: HOSPITAL | Age: 76
End: 2021-03-04
Payer: MEDICARE

## 2021-03-15 ENCOUNTER — DOCUMENT SCAN (OUTPATIENT)
Dept: HOME HEALTH SERVICES | Facility: HOSPITAL | Age: 76
End: 2021-03-15
Payer: MEDICARE

## 2021-03-31 ENCOUNTER — EXTERNAL CHRONIC CARE MANAGEMENT (OUTPATIENT)
Dept: PRIMARY CARE CLINIC | Facility: CLINIC | Age: 76
End: 2021-03-31
Payer: MEDICARE

## 2021-03-31 PROCEDURE — 99490 PR CHRONIC CARE MGMT, 1ST 20 MIN: ICD-10-PCS | Mod: S$GLB,,, | Performed by: FAMILY MEDICINE

## 2021-03-31 PROCEDURE — 99490 CHRNC CARE MGMT STAFF 1ST 20: CPT | Mod: S$GLB,,, | Performed by: FAMILY MEDICINE

## 2021-04-01 ENCOUNTER — DOCUMENT SCAN (OUTPATIENT)
Dept: HOME HEALTH SERVICES | Facility: HOSPITAL | Age: 76
End: 2021-04-01
Payer: MEDICARE

## 2021-04-09 ENCOUNTER — EXTERNAL HOME HEALTH (OUTPATIENT)
Dept: HOME HEALTH SERVICES | Facility: HOSPITAL | Age: 76
End: 2021-04-09
Payer: MEDICARE

## 2021-04-12 ENCOUNTER — PATIENT MESSAGE (OUTPATIENT)
Dept: CARDIOLOGY | Facility: CLINIC | Age: 76
End: 2021-04-12

## 2021-04-12 ENCOUNTER — EXTERNAL HOME HEALTH (OUTPATIENT)
Dept: HOME HEALTH SERVICES | Facility: HOSPITAL | Age: 76
End: 2021-04-12
Payer: MEDICARE

## 2021-04-12 DIAGNOSIS — I63.032 CEREBRAL INFARCTION DUE TO THROMBOSIS OF LEFT CAROTID ARTERY: ICD-10-CM

## 2021-04-12 DIAGNOSIS — E78.5 HYPERLIPIDEMIA: ICD-10-CM

## 2021-04-12 RX ORDER — ROSUVASTATIN CALCIUM 40 MG/1
40 TABLET, COATED ORAL NIGHTLY
Qty: 90 TABLET | Refills: 1 | Status: SHIPPED | OUTPATIENT
Start: 2021-04-12 | End: 2021-10-05

## 2021-04-20 ENCOUNTER — DOCUMENT SCAN (OUTPATIENT)
Dept: HOME HEALTH SERVICES | Facility: HOSPITAL | Age: 76
End: 2021-04-20
Payer: MEDICARE

## 2021-04-26 ENCOUNTER — DOCUMENT SCAN (OUTPATIENT)
Dept: HOME HEALTH SERVICES | Facility: HOSPITAL | Age: 76
End: 2021-04-26
Payer: MEDICARE

## 2021-04-30 ENCOUNTER — EXTERNAL CHRONIC CARE MANAGEMENT (OUTPATIENT)
Dept: PRIMARY CARE CLINIC | Facility: CLINIC | Age: 76
End: 2021-04-30
Payer: MEDICARE

## 2021-04-30 PROCEDURE — 99490 CHRNC CARE MGMT STAFF 1ST 20: CPT | Mod: S$GLB,,, | Performed by: FAMILY MEDICINE

## 2021-04-30 PROCEDURE — 99490 PR CHRONIC CARE MGMT, 1ST 20 MIN: ICD-10-PCS | Mod: S$GLB,,, | Performed by: FAMILY MEDICINE

## 2021-05-11 ENCOUNTER — PATIENT MESSAGE (OUTPATIENT)
Dept: PAIN MEDICINE | Facility: CLINIC | Age: 76
End: 2021-05-11

## 2021-05-13 ENCOUNTER — PATIENT MESSAGE (OUTPATIENT)
Dept: PAIN MEDICINE | Facility: CLINIC | Age: 76
End: 2021-05-13

## 2021-05-13 ENCOUNTER — TELEPHONE (OUTPATIENT)
Dept: PAIN MEDICINE | Facility: CLINIC | Age: 76
End: 2021-05-13

## 2021-05-13 DIAGNOSIS — M47.896 OTHER SPONDYLOSIS, LUMBAR REGION: Primary | ICD-10-CM

## 2021-05-18 RX ORDER — GABAPENTIN 300 MG/1
300 CAPSULE ORAL 3 TIMES DAILY
Qty: 90 CAPSULE | Refills: 1 | Status: SHIPPED | OUTPATIENT
Start: 2021-05-18 | End: 2021-08-23

## 2021-05-19 ENCOUNTER — PATIENT MESSAGE (OUTPATIENT)
Dept: FAMILY MEDICINE | Facility: CLINIC | Age: 76
End: 2021-05-19

## 2021-05-28 ENCOUNTER — HOSPITAL ENCOUNTER (OUTPATIENT)
Facility: AMBULARY SURGERY CENTER | Age: 76
Discharge: HOME | End: 2021-05-28
Attending: ANESTHESIOLOGY | Admitting: ANESTHESIOLOGY
Payer: MEDICARE

## 2021-05-28 DIAGNOSIS — M47.896 OTHER SPONDYLOSIS, LUMBAR REGION: Primary | ICD-10-CM

## 2021-05-28 PROCEDURE — 64636 DESTROY L/S FACET JNT ADDL: CPT | Mod: LT | Performed by: ANESTHESIOLOGY

## 2021-05-28 PROCEDURE — 64636 DESTROY L/S FACET JNT ADDL: CPT | Mod: 50,,, | Performed by: ANESTHESIOLOGY

## 2021-05-28 PROCEDURE — 64636 PR DESTROY L/S FACET JNT ADDL: ICD-10-PCS | Mod: 50,,, | Performed by: ANESTHESIOLOGY

## 2021-05-28 PROCEDURE — 64635 DESTROY LUMB/SAC FACET JNT: CPT | Mod: 50,,, | Performed by: ANESTHESIOLOGY

## 2021-05-28 PROCEDURE — 64635 DESTROY LUMB/SAC FACET JNT: CPT | Mod: RT | Performed by: ANESTHESIOLOGY

## 2021-05-28 PROCEDURE — 64635 PR DESTROY LUMB/SAC FACET JNT: ICD-10-PCS | Mod: 50,,, | Performed by: ANESTHESIOLOGY

## 2021-05-28 RX ORDER — FENTANYL CITRATE 50 UG/ML
INJECTION, SOLUTION INTRAMUSCULAR; INTRAVENOUS
Status: DISCONTINUED | OUTPATIENT
Start: 2021-05-28 | End: 2021-05-28 | Stop reason: HOSPADM

## 2021-05-28 RX ORDER — BUPIVACAINE HYDROCHLORIDE 2.5 MG/ML
INJECTION, SOLUTION EPIDURAL; INFILTRATION; INTRACAUDAL
Status: DISCONTINUED | OUTPATIENT
Start: 2021-05-28 | End: 2021-05-28 | Stop reason: HOSPADM

## 2021-05-28 RX ORDER — LIDOCAINE HYDROCHLORIDE 20 MG/ML
INJECTION, SOLUTION EPIDURAL; INFILTRATION; INTRACAUDAL; PERINEURAL
Status: DISCONTINUED | OUTPATIENT
Start: 2021-05-28 | End: 2021-05-28 | Stop reason: HOSPADM

## 2021-05-28 RX ORDER — METHYLPREDNISOLONE ACETATE 80 MG/ML
INJECTION, SUSPENSION INTRA-ARTICULAR; INTRALESIONAL; INTRAMUSCULAR; SOFT TISSUE
Status: DISCONTINUED | OUTPATIENT
Start: 2021-05-28 | End: 2021-05-28 | Stop reason: HOSPADM

## 2021-05-28 RX ORDER — SODIUM CHLORIDE, SODIUM LACTATE, POTASSIUM CHLORIDE, CALCIUM CHLORIDE 600; 310; 30; 20 MG/100ML; MG/100ML; MG/100ML; MG/100ML
INJECTION, SOLUTION INTRAVENOUS ONCE AS NEEDED
Status: COMPLETED | OUTPATIENT
Start: 2021-05-28 | End: 2021-05-28

## 2021-05-28 RX ORDER — LIDOCAINE HYDROCHLORIDE 10 MG/ML
INJECTION, SOLUTION EPIDURAL; INFILTRATION; INTRACAUDAL; PERINEURAL
Status: DISCONTINUED | OUTPATIENT
Start: 2021-05-28 | End: 2021-05-28 | Stop reason: HOSPADM

## 2021-05-28 RX ORDER — MIDAZOLAM HYDROCHLORIDE 2 MG/2ML
INJECTION, SOLUTION INTRAMUSCULAR; INTRAVENOUS
Status: DISCONTINUED | OUTPATIENT
Start: 2021-05-28 | End: 2021-05-28 | Stop reason: HOSPADM

## 2021-05-28 RX ADMIN — SODIUM CHLORIDE, SODIUM LACTATE, POTASSIUM CHLORIDE, CALCIUM CHLORIDE: 600; 310; 30; 20 INJECTION, SOLUTION INTRAVENOUS at 11:05

## 2021-05-31 ENCOUNTER — EXTERNAL CHRONIC CARE MANAGEMENT (OUTPATIENT)
Dept: PRIMARY CARE CLINIC | Facility: CLINIC | Age: 76
End: 2021-05-31
Payer: MEDICARE

## 2021-05-31 VITALS
HEART RATE: 49 BPM | HEIGHT: 67 IN | DIASTOLIC BLOOD PRESSURE: 75 MMHG | TEMPERATURE: 98 F | WEIGHT: 103 LBS | OXYGEN SATURATION: 97 % | SYSTOLIC BLOOD PRESSURE: 149 MMHG | RESPIRATION RATE: 18 BRPM | BODY MASS INDEX: 16.17 KG/M2

## 2021-05-31 PROCEDURE — 99490 CHRNC CARE MGMT STAFF 1ST 20: CPT | Mod: S$GLB,,, | Performed by: FAMILY MEDICINE

## 2021-05-31 PROCEDURE — 99490 PR CHRONIC CARE MGMT, 1ST 20 MIN: ICD-10-PCS | Mod: S$GLB,,, | Performed by: FAMILY MEDICINE

## 2021-06-30 ENCOUNTER — EXTERNAL CHRONIC CARE MANAGEMENT (OUTPATIENT)
Dept: PRIMARY CARE CLINIC | Facility: CLINIC | Age: 76
End: 2021-06-30
Payer: MEDICARE

## 2021-06-30 PROCEDURE — 99490 CHRNC CARE MGMT STAFF 1ST 20: CPT | Mod: S$GLB,,, | Performed by: FAMILY MEDICINE

## 2021-06-30 PROCEDURE — 99490 PR CHRONIC CARE MGMT, 1ST 20 MIN: ICD-10-PCS | Mod: S$GLB,,, | Performed by: FAMILY MEDICINE

## 2021-07-08 ENCOUNTER — PATIENT OUTREACH (OUTPATIENT)
Dept: ADMINISTRATIVE | Facility: OTHER | Age: 76
End: 2021-07-08

## 2021-07-09 ENCOUNTER — OFFICE VISIT (OUTPATIENT)
Dept: PAIN MEDICINE | Facility: CLINIC | Age: 76
End: 2021-07-09
Payer: MEDICARE

## 2021-07-09 VITALS
HEIGHT: 67 IN | SYSTOLIC BLOOD PRESSURE: 102 MMHG | WEIGHT: 108 LBS | HEART RATE: 64 BPM | DIASTOLIC BLOOD PRESSURE: 68 MMHG | BODY MASS INDEX: 16.95 KG/M2

## 2021-07-09 DIAGNOSIS — M50.30 DDD (DEGENERATIVE DISC DISEASE), CERVICAL: ICD-10-CM

## 2021-07-09 DIAGNOSIS — M47.896 OTHER SPONDYLOSIS, LUMBAR REGION: Primary | ICD-10-CM

## 2021-07-09 DIAGNOSIS — M47.892 OTHER SPONDYLOSIS, CERVICAL REGION: ICD-10-CM

## 2021-07-09 DIAGNOSIS — M25.511 RIGHT SHOULDER PAIN, UNSPECIFIED CHRONICITY: ICD-10-CM

## 2021-07-09 PROCEDURE — 1101F PT FALLS ASSESS-DOCD LE1/YR: CPT | Mod: S$GLB,,, | Performed by: PHYSICIAN ASSISTANT

## 2021-07-09 PROCEDURE — 99999 PR PBB SHADOW E&M-EST. PATIENT-LVL III: ICD-10-PCS | Mod: PBBFAC,,, | Performed by: PHYSICIAN ASSISTANT

## 2021-07-09 PROCEDURE — 1159F MED LIST DOCD IN RCRD: CPT | Mod: S$GLB,,, | Performed by: PHYSICIAN ASSISTANT

## 2021-07-09 PROCEDURE — 1126F PR PAIN SEVERITY QUANTIFIED, NO PAIN PRESENT: ICD-10-PCS | Mod: S$GLB,,, | Performed by: PHYSICIAN ASSISTANT

## 2021-07-09 PROCEDURE — 3288F FALL RISK ASSESSMENT DOCD: CPT | Mod: S$GLB,,, | Performed by: PHYSICIAN ASSISTANT

## 2021-07-09 PROCEDURE — 99214 PR OFFICE/OUTPT VISIT, EST, LEVL IV, 30-39 MIN: ICD-10-PCS | Mod: S$GLB,,, | Performed by: PHYSICIAN ASSISTANT

## 2021-07-09 PROCEDURE — 1157F ADVNC CARE PLAN IN RCRD: CPT | Mod: S$GLB,,, | Performed by: PHYSICIAN ASSISTANT

## 2021-07-09 PROCEDURE — 1157F PR ADVANCE CARE PLAN OR EQUIV PRESENT IN MEDICAL RECORD: ICD-10-PCS | Mod: S$GLB,,, | Performed by: PHYSICIAN ASSISTANT

## 2021-07-09 PROCEDURE — 1126F AMNT PAIN NOTED NONE PRSNT: CPT | Mod: S$GLB,,, | Performed by: PHYSICIAN ASSISTANT

## 2021-07-09 PROCEDURE — 99214 OFFICE O/P EST MOD 30 MIN: CPT | Mod: S$GLB,,, | Performed by: PHYSICIAN ASSISTANT

## 2021-07-09 PROCEDURE — 99999 PR PBB SHADOW E&M-EST. PATIENT-LVL III: CPT | Mod: PBBFAC,,, | Performed by: PHYSICIAN ASSISTANT

## 2021-07-09 PROCEDURE — 3288F PR FALLS RISK ASSESSMENT DOCUMENTED: ICD-10-PCS | Mod: S$GLB,,, | Performed by: PHYSICIAN ASSISTANT

## 2021-07-09 PROCEDURE — 1159F PR MEDICATION LIST DOCUMENTED IN MEDICAL RECORD: ICD-10-PCS | Mod: S$GLB,,, | Performed by: PHYSICIAN ASSISTANT

## 2021-07-09 PROCEDURE — 1101F PR PT FALLS ASSESS DOC 0-1 FALLS W/OUT INJ PAST YR: ICD-10-PCS | Mod: S$GLB,,, | Performed by: PHYSICIAN ASSISTANT

## 2021-07-09 RX ORDER — VORTIOXETINE 10 MG/1
1 TABLET, FILM COATED ORAL NIGHTLY
COMMUNITY
Start: 2021-07-01 | End: 2022-12-07 | Stop reason: SDUPTHER

## 2021-07-09 RX ORDER — ATROPINE SULFATE 10 MG/ML
1 SOLUTION/ DROPS OPHTHALMIC DAILY
COMMUNITY
Start: 2021-07-03 | End: 2022-11-18 | Stop reason: SDUPTHER

## 2021-07-09 RX ORDER — FLUTICASONE FUROATE AND VILANTEROL TRIFENATATE 200; 25 UG/1; UG/1
POWDER RESPIRATORY (INHALATION)
COMMUNITY
End: 2021-08-11

## 2021-07-09 RX ORDER — MIRTAZAPINE 15 MG/1
7.5 TABLET, FILM COATED ORAL NIGHTLY
COMMUNITY
Start: 2021-02-24 | End: 2021-09-10 | Stop reason: CLARIF

## 2021-07-09 RX ORDER — FLUTICASONE FUROATE, UMECLIDINIUM BROMIDE AND VILANTEROL TRIFENATATE 200; 62.5; 25 UG/1; UG/1; UG/1
1 POWDER RESPIRATORY (INHALATION) DAILY
COMMUNITY
Start: 2021-06-04 | End: 2022-04-26

## 2021-07-09 RX ORDER — ONDANSETRON HYDROCHLORIDE 8 MG/1
8 TABLET, FILM COATED ORAL DAILY PRN
COMMUNITY
End: 2021-09-10 | Stop reason: CLARIF

## 2021-07-16 ENCOUNTER — OFFICE VISIT (OUTPATIENT)
Dept: FAMILY MEDICINE | Facility: CLINIC | Age: 76
End: 2021-07-16
Attending: FAMILY MEDICINE
Payer: MEDICARE

## 2021-07-16 VITALS
BODY MASS INDEX: 17.02 KG/M2 | HEART RATE: 58 BPM | SYSTOLIC BLOOD PRESSURE: 130 MMHG | HEIGHT: 67 IN | TEMPERATURE: 98 F | OXYGEN SATURATION: 99 % | DIASTOLIC BLOOD PRESSURE: 68 MMHG | WEIGHT: 108.44 LBS

## 2021-07-16 DIAGNOSIS — E44.1 MILD PROTEIN-CALORIE MALNUTRITION: ICD-10-CM

## 2021-07-16 DIAGNOSIS — F41.1 GAD (GENERALIZED ANXIETY DISORDER): ICD-10-CM

## 2021-07-16 DIAGNOSIS — E88.09 HYPOALBUMINEMIA: ICD-10-CM

## 2021-07-16 DIAGNOSIS — E78.2 MIXED HYPERLIPIDEMIA: ICD-10-CM

## 2021-07-16 DIAGNOSIS — I48.0 PAROXYSMAL ATRIAL FIBRILLATION: ICD-10-CM

## 2021-07-16 DIAGNOSIS — I10 ESSENTIAL HYPERTENSION: ICD-10-CM

## 2021-07-16 DIAGNOSIS — F33.41 RECURRENT MAJOR DEPRESSIVE DISORDER, IN PARTIAL REMISSION: Primary | ICD-10-CM

## 2021-07-16 DIAGNOSIS — D50.0 IRON DEFICIENCY ANEMIA DUE TO CHRONIC BLOOD LOSS: ICD-10-CM

## 2021-07-16 DIAGNOSIS — J45.30 MILD PERSISTENT ASTHMA WITHOUT COMPLICATION: ICD-10-CM

## 2021-07-16 PROCEDURE — 3078F PR MOST RECENT DIASTOLIC BLOOD PRESSURE < 80 MM HG: ICD-10-PCS | Mod: S$GLB,,, | Performed by: FAMILY MEDICINE

## 2021-07-16 PROCEDURE — 1126F AMNT PAIN NOTED NONE PRSNT: CPT | Mod: S$GLB,,, | Performed by: FAMILY MEDICINE

## 2021-07-16 PROCEDURE — 1157F ADVNC CARE PLAN IN RCRD: CPT | Mod: S$GLB,,, | Performed by: FAMILY MEDICINE

## 2021-07-16 PROCEDURE — 3075F SYST BP GE 130 - 139MM HG: CPT | Mod: S$GLB,,, | Performed by: FAMILY MEDICINE

## 2021-07-16 PROCEDURE — 99999 PR PBB SHADOW E&M-EST. PATIENT-LVL V: CPT | Mod: PBBFAC,,, | Performed by: FAMILY MEDICINE

## 2021-07-16 PROCEDURE — 99214 PR OFFICE/OUTPT VISIT, EST, LEVL IV, 30-39 MIN: ICD-10-PCS | Mod: S$GLB,,, | Performed by: FAMILY MEDICINE

## 2021-07-16 PROCEDURE — 3078F DIAST BP <80 MM HG: CPT | Mod: S$GLB,,, | Performed by: FAMILY MEDICINE

## 2021-07-16 PROCEDURE — 99214 OFFICE O/P EST MOD 30 MIN: CPT | Mod: S$GLB,,, | Performed by: FAMILY MEDICINE

## 2021-07-16 PROCEDURE — 1157F PR ADVANCE CARE PLAN OR EQUIV PRESENT IN MEDICAL RECORD: ICD-10-PCS | Mod: S$GLB,,, | Performed by: FAMILY MEDICINE

## 2021-07-16 PROCEDURE — 1101F PT FALLS ASSESS-DOCD LE1/YR: CPT | Mod: S$GLB,,, | Performed by: FAMILY MEDICINE

## 2021-07-16 PROCEDURE — 3288F PR FALLS RISK ASSESSMENT DOCUMENTED: ICD-10-PCS | Mod: S$GLB,,, | Performed by: FAMILY MEDICINE

## 2021-07-16 PROCEDURE — 1159F MED LIST DOCD IN RCRD: CPT | Mod: S$GLB,,, | Performed by: FAMILY MEDICINE

## 2021-07-16 PROCEDURE — 3288F FALL RISK ASSESSMENT DOCD: CPT | Mod: S$GLB,,, | Performed by: FAMILY MEDICINE

## 2021-07-16 PROCEDURE — 1126F PR PAIN SEVERITY QUANTIFIED, NO PAIN PRESENT: ICD-10-PCS | Mod: S$GLB,,, | Performed by: FAMILY MEDICINE

## 2021-07-16 PROCEDURE — 99999 PR PBB SHADOW E&M-EST. PATIENT-LVL V: ICD-10-PCS | Mod: PBBFAC,,, | Performed by: FAMILY MEDICINE

## 2021-07-16 PROCEDURE — 1101F PR PT FALLS ASSESS DOC 0-1 FALLS W/OUT INJ PAST YR: ICD-10-PCS | Mod: S$GLB,,, | Performed by: FAMILY MEDICINE

## 2021-07-16 PROCEDURE — 3075F PR MOST RECENT SYSTOLIC BLOOD PRESS GE 130-139MM HG: ICD-10-PCS | Mod: S$GLB,,, | Performed by: FAMILY MEDICINE

## 2021-07-16 PROCEDURE — 1159F PR MEDICATION LIST DOCUMENTED IN MEDICAL RECORD: ICD-10-PCS | Mod: S$GLB,,, | Performed by: FAMILY MEDICINE

## 2021-07-19 ENCOUNTER — PATIENT MESSAGE (OUTPATIENT)
Dept: CARDIOLOGY | Facility: CLINIC | Age: 76
End: 2021-07-19

## 2021-07-31 ENCOUNTER — EXTERNAL CHRONIC CARE MANAGEMENT (OUTPATIENT)
Dept: PRIMARY CARE CLINIC | Facility: CLINIC | Age: 76
End: 2021-07-31
Payer: MEDICARE

## 2021-07-31 PROCEDURE — 99490 PR CHRONIC CARE MGMT, 1ST 20 MIN: ICD-10-PCS | Mod: S$GLB,,, | Performed by: FAMILY MEDICINE

## 2021-07-31 PROCEDURE — 99490 CHRNC CARE MGMT STAFF 1ST 20: CPT | Mod: S$GLB,,, | Performed by: FAMILY MEDICINE

## 2021-08-06 ENCOUNTER — OFFICE VISIT (OUTPATIENT)
Dept: PULMONOLOGY | Facility: CLINIC | Age: 76
End: 2021-08-06
Payer: MEDICARE

## 2021-08-06 VITALS
DIASTOLIC BLOOD PRESSURE: 78 MMHG | BODY MASS INDEX: 17.56 KG/M2 | OXYGEN SATURATION: 97 % | HEIGHT: 67 IN | HEART RATE: 59 BPM | WEIGHT: 111.88 LBS | SYSTOLIC BLOOD PRESSURE: 121 MMHG

## 2021-08-06 DIAGNOSIS — J45.20 MILD INTERMITTENT ASTHMA WITHOUT COMPLICATION: Primary | ICD-10-CM

## 2021-08-06 PROCEDURE — 3288F FALL RISK ASSESSMENT DOCD: CPT | Mod: S$GLB,,, | Performed by: NURSE PRACTITIONER

## 2021-08-06 PROCEDURE — 1126F AMNT PAIN NOTED NONE PRSNT: CPT | Mod: S$GLB,,, | Performed by: NURSE PRACTITIONER

## 2021-08-06 PROCEDURE — 1157F ADVNC CARE PLAN IN RCRD: CPT | Mod: S$GLB,,, | Performed by: NURSE PRACTITIONER

## 2021-08-06 PROCEDURE — 3078F PR MOST RECENT DIASTOLIC BLOOD PRESSURE < 80 MM HG: ICD-10-PCS | Mod: S$GLB,,, | Performed by: NURSE PRACTITIONER

## 2021-08-06 PROCEDURE — 99999 PR PBB SHADOW E&M-EST. PATIENT-LVL IV: CPT | Mod: PBBFAC,,, | Performed by: NURSE PRACTITIONER

## 2021-08-06 PROCEDURE — 1101F PR PT FALLS ASSESS DOC 0-1 FALLS W/OUT INJ PAST YR: ICD-10-PCS | Mod: S$GLB,,, | Performed by: NURSE PRACTITIONER

## 2021-08-06 PROCEDURE — 3074F SYST BP LT 130 MM HG: CPT | Mod: S$GLB,,, | Performed by: NURSE PRACTITIONER

## 2021-08-06 PROCEDURE — 3074F PR MOST RECENT SYSTOLIC BLOOD PRESSURE < 130 MM HG: ICD-10-PCS | Mod: S$GLB,,, | Performed by: NURSE PRACTITIONER

## 2021-08-06 PROCEDURE — 3288F PR FALLS RISK ASSESSMENT DOCUMENTED: ICD-10-PCS | Mod: S$GLB,,, | Performed by: NURSE PRACTITIONER

## 2021-08-06 PROCEDURE — 1159F PR MEDICATION LIST DOCUMENTED IN MEDICAL RECORD: ICD-10-PCS | Mod: S$GLB,,, | Performed by: NURSE PRACTITIONER

## 2021-08-06 PROCEDURE — 3078F DIAST BP <80 MM HG: CPT | Mod: S$GLB,,, | Performed by: NURSE PRACTITIONER

## 2021-08-06 PROCEDURE — 1126F PR PAIN SEVERITY QUANTIFIED, NO PAIN PRESENT: ICD-10-PCS | Mod: S$GLB,,, | Performed by: NURSE PRACTITIONER

## 2021-08-06 PROCEDURE — 1101F PT FALLS ASSESS-DOCD LE1/YR: CPT | Mod: S$GLB,,, | Performed by: NURSE PRACTITIONER

## 2021-08-06 PROCEDURE — 99999 PR PBB SHADOW E&M-EST. PATIENT-LVL IV: ICD-10-PCS | Mod: PBBFAC,,, | Performed by: NURSE PRACTITIONER

## 2021-08-06 PROCEDURE — 1159F MED LIST DOCD IN RCRD: CPT | Mod: S$GLB,,, | Performed by: NURSE PRACTITIONER

## 2021-08-06 PROCEDURE — 99213 PR OFFICE/OUTPT VISIT, EST, LEVL III, 20-29 MIN: ICD-10-PCS | Mod: S$GLB,,, | Performed by: NURSE PRACTITIONER

## 2021-08-06 PROCEDURE — 1157F PR ADVANCE CARE PLAN OR EQUIV PRESENT IN MEDICAL RECORD: ICD-10-PCS | Mod: S$GLB,,, | Performed by: NURSE PRACTITIONER

## 2021-08-06 PROCEDURE — 99213 OFFICE O/P EST LOW 20 MIN: CPT | Mod: S$GLB,,, | Performed by: NURSE PRACTITIONER

## 2021-08-08 ENCOUNTER — PATIENT OUTREACH (OUTPATIENT)
Dept: ADMINISTRATIVE | Facility: OTHER | Age: 76
End: 2021-08-08

## 2021-08-11 ENCOUNTER — OFFICE VISIT (OUTPATIENT)
Dept: CARDIOLOGY | Facility: CLINIC | Age: 76
End: 2021-08-11
Payer: MEDICARE

## 2021-08-11 VITALS
DIASTOLIC BLOOD PRESSURE: 77 MMHG | BODY MASS INDEX: 17.68 KG/M2 | HEIGHT: 67 IN | RESPIRATION RATE: 16 BRPM | SYSTOLIC BLOOD PRESSURE: 137 MMHG | OXYGEN SATURATION: 98 % | WEIGHT: 112.63 LBS | HEART RATE: 70 BPM

## 2021-08-11 DIAGNOSIS — E88.09 HYPOALBUMINEMIA DUE TO PROTEIN-CALORIE MALNUTRITION: ICD-10-CM

## 2021-08-11 DIAGNOSIS — R79.89 ELEVATED BRAIN NATRIURETIC PEPTIDE (BNP) LEVEL: ICD-10-CM

## 2021-08-11 DIAGNOSIS — I48.0 PAROXYSMAL ATRIAL FIBRILLATION: ICD-10-CM

## 2021-08-11 DIAGNOSIS — Z91.89 AT RISK FOR CARDIOVASCULAR EVENT: Primary | ICD-10-CM

## 2021-08-11 DIAGNOSIS — E46 HYPOALBUMINEMIA DUE TO PROTEIN-CALORIE MALNUTRITION: ICD-10-CM

## 2021-08-11 DIAGNOSIS — T50.905A BRADYCARDIA, DRUG INDUCED: ICD-10-CM

## 2021-08-11 DIAGNOSIS — R00.1 BRADYCARDIA, DRUG INDUCED: ICD-10-CM

## 2021-08-11 DIAGNOSIS — F33.1 MODERATE EPISODE OF RECURRENT MAJOR DEPRESSIVE DISORDER: ICD-10-CM

## 2021-08-11 PROCEDURE — 1157F ADVNC CARE PLAN IN RCRD: CPT | Mod: S$GLB,,, | Performed by: INTERNAL MEDICINE

## 2021-08-11 PROCEDURE — 93010 EKG 12-LEAD: ICD-10-PCS | Mod: S$GLB,,, | Performed by: INTERNAL MEDICINE

## 2021-08-11 PROCEDURE — 99215 OFFICE O/P EST HI 40 MIN: CPT | Mod: 25,S$GLB,, | Performed by: INTERNAL MEDICINE

## 2021-08-11 PROCEDURE — 1126F PR PAIN SEVERITY QUANTIFIED, NO PAIN PRESENT: ICD-10-PCS | Mod: S$GLB,,, | Performed by: INTERNAL MEDICINE

## 2021-08-11 PROCEDURE — 99999 PR PBB SHADOW E&M-EST. PATIENT-LVL V: ICD-10-PCS | Mod: PBBFAC,,, | Performed by: INTERNAL MEDICINE

## 2021-08-11 PROCEDURE — 1159F PR MEDICATION LIST DOCUMENTED IN MEDICAL RECORD: ICD-10-PCS | Mod: S$GLB,,, | Performed by: INTERNAL MEDICINE

## 2021-08-11 PROCEDURE — 3075F PR MOST RECENT SYSTOLIC BLOOD PRESS GE 130-139MM HG: ICD-10-PCS | Mod: S$GLB,,, | Performed by: INTERNAL MEDICINE

## 2021-08-11 PROCEDURE — 3288F FALL RISK ASSESSMENT DOCD: CPT | Mod: S$GLB,,, | Performed by: INTERNAL MEDICINE

## 2021-08-11 PROCEDURE — 3075F SYST BP GE 130 - 139MM HG: CPT | Mod: S$GLB,,, | Performed by: INTERNAL MEDICINE

## 2021-08-11 PROCEDURE — 1159F MED LIST DOCD IN RCRD: CPT | Mod: S$GLB,,, | Performed by: INTERNAL MEDICINE

## 2021-08-11 PROCEDURE — 3288F PR FALLS RISK ASSESSMENT DOCUMENTED: ICD-10-PCS | Mod: S$GLB,,, | Performed by: INTERNAL MEDICINE

## 2021-08-11 PROCEDURE — 1126F AMNT PAIN NOTED NONE PRSNT: CPT | Mod: S$GLB,,, | Performed by: INTERNAL MEDICINE

## 2021-08-11 PROCEDURE — 3078F PR MOST RECENT DIASTOLIC BLOOD PRESSURE < 80 MM HG: ICD-10-PCS | Mod: S$GLB,,, | Performed by: INTERNAL MEDICINE

## 2021-08-11 PROCEDURE — 3078F DIAST BP <80 MM HG: CPT | Mod: S$GLB,,, | Performed by: INTERNAL MEDICINE

## 2021-08-11 PROCEDURE — 93010 ELECTROCARDIOGRAM REPORT: CPT | Mod: S$GLB,,, | Performed by: INTERNAL MEDICINE

## 2021-08-11 PROCEDURE — 1100F PTFALLS ASSESS-DOCD GE2>/YR: CPT | Mod: S$GLB,,, | Performed by: INTERNAL MEDICINE

## 2021-08-11 PROCEDURE — 99215 PR OFFICE/OUTPT VISIT, EST, LEVL V, 40-54 MIN: ICD-10-PCS | Mod: 25,S$GLB,, | Performed by: INTERNAL MEDICINE

## 2021-08-11 PROCEDURE — 1157F PR ADVANCE CARE PLAN OR EQUIV PRESENT IN MEDICAL RECORD: ICD-10-PCS | Mod: S$GLB,,, | Performed by: INTERNAL MEDICINE

## 2021-08-11 PROCEDURE — 1100F PR PT FALLS ASSESS DOC 2+ FALLS/FALL W/INJURY/YR: ICD-10-PCS | Mod: S$GLB,,, | Performed by: INTERNAL MEDICINE

## 2021-08-11 PROCEDURE — 99999 PR PBB SHADOW E&M-EST. PATIENT-LVL V: CPT | Mod: PBBFAC,,, | Performed by: INTERNAL MEDICINE

## 2021-08-31 ENCOUNTER — EXTERNAL CHRONIC CARE MANAGEMENT (OUTPATIENT)
Dept: PRIMARY CARE CLINIC | Facility: CLINIC | Age: 76
End: 2021-08-31
Payer: MEDICARE

## 2021-08-31 PROCEDURE — 99490 CHRNC CARE MGMT STAFF 1ST 20: CPT | Mod: S$GLB,,, | Performed by: FAMILY MEDICINE

## 2021-08-31 PROCEDURE — 99439 CHRNC CARE MGMT STAF EA ADDL: CPT | Mod: S$GLB,,, | Performed by: FAMILY MEDICINE

## 2021-08-31 PROCEDURE — 99490 PR CHRONIC CARE MGMT, 1ST 20 MIN: ICD-10-PCS | Mod: S$GLB,,, | Performed by: FAMILY MEDICINE

## 2021-08-31 PROCEDURE — 99439 PR CHRONIC CARE MGMT, EA ADDTL 20 MIN: ICD-10-PCS | Mod: S$GLB,,, | Performed by: FAMILY MEDICINE

## 2021-09-08 ENCOUNTER — PATIENT MESSAGE (OUTPATIENT)
Dept: CARDIOLOGY | Facility: CLINIC | Age: 76
End: 2021-09-08

## 2021-09-10 ENCOUNTER — HOSPITAL ENCOUNTER (OUTPATIENT)
Facility: HOSPITAL | Age: 76
Discharge: HOME | End: 2021-09-13
Attending: EMERGENCY MEDICINE | Admitting: HOSPITALIST
Payer: MEDICARE

## 2021-09-10 DIAGNOSIS — R00.1 BRADYCARDIA: ICD-10-CM

## 2021-09-10 DIAGNOSIS — J84.9 INTERSTITIAL LUNG DISEASE: ICD-10-CM

## 2021-09-10 DIAGNOSIS — I48.91 ATRIAL FIBRILLATION: ICD-10-CM

## 2021-09-10 DIAGNOSIS — E78.2 MIXED HYPERLIPIDEMIA: ICD-10-CM

## 2021-09-10 DIAGNOSIS — I48.91 ATRIAL FIBRILLATION WITH RVR: Primary | ICD-10-CM

## 2021-09-10 DIAGNOSIS — I48.0 PAROXYSMAL ATRIAL FIBRILLATION: ICD-10-CM

## 2021-09-10 DIAGNOSIS — R79.89 ELEVATED TROPONIN: ICD-10-CM

## 2021-09-10 DIAGNOSIS — I48.91 ATRIAL FIBRILLATION, UNSPECIFIED TYPE: ICD-10-CM

## 2021-09-10 DIAGNOSIS — I10 ESSENTIAL HYPERTENSION: ICD-10-CM

## 2021-09-10 DIAGNOSIS — F41.1 GAD (GENERALIZED ANXIETY DISORDER): ICD-10-CM

## 2021-09-10 DIAGNOSIS — I48.91 A-FIB: ICD-10-CM

## 2021-09-10 LAB
ALBUMIN SERPL BCP-MCNC: 3.5 G/DL (ref 3.5–5.2)
ALP SERPL-CCNC: 80 U/L (ref 55–135)
ALT SERPL W/O P-5'-P-CCNC: 27 U/L (ref 10–44)
ANION GAP SERPL CALC-SCNC: 9 MMOL/L (ref 8–16)
AST SERPL-CCNC: 33 U/L (ref 10–40)
BASOPHILS # BLD AUTO: 0.04 K/UL (ref 0–0.2)
BASOPHILS NFR BLD: 0.7 % (ref 0–1.9)
BILIRUB SERPL-MCNC: 1.5 MG/DL (ref 0.1–1)
BUN SERPL-MCNC: 22 MG/DL (ref 8–23)
CALCIUM SERPL-MCNC: 9.4 MG/DL (ref 8.7–10.5)
CHLORIDE SERPL-SCNC: 109 MMOL/L (ref 95–110)
CO2 SERPL-SCNC: 22 MMOL/L (ref 23–29)
CREAT SERPL-MCNC: 0.8 MG/DL (ref 0.5–1.4)
DIFFERENTIAL METHOD: ABNORMAL
EOSINOPHIL # BLD AUTO: 0.3 K/UL (ref 0–0.5)
EOSINOPHIL NFR BLD: 5.8 % (ref 0–8)
ERYTHROCYTE [DISTWIDTH] IN BLOOD BY AUTOMATED COUNT: 13.8 % (ref 11.5–14.5)
EST. GFR  (AFRICAN AMERICAN): >60 ML/MIN/1.73 M^2
EST. GFR  (NON AFRICAN AMERICAN): >60 ML/MIN/1.73 M^2
GLUCOSE SERPL-MCNC: 166 MG/DL (ref 70–110)
HCT VFR BLD AUTO: 38.9 % (ref 37–48.5)
HGB BLD-MCNC: 12.6 G/DL (ref 12–16)
IMM GRANULOCYTES # BLD AUTO: 0.01 K/UL (ref 0–0.04)
IMM GRANULOCYTES NFR BLD AUTO: 0.2 % (ref 0–0.5)
LYMPHOCYTES # BLD AUTO: 1.1 K/UL (ref 1–4.8)
LYMPHOCYTES NFR BLD: 18 % (ref 18–48)
MAGNESIUM SERPL-MCNC: 1.8 MG/DL (ref 1.6–2.6)
MCH RBC QN AUTO: 31.5 PG (ref 27–31)
MCHC RBC AUTO-ENTMCNC: 32.4 G/DL (ref 32–36)
MCV RBC AUTO: 97 FL (ref 82–98)
MONOCYTES # BLD AUTO: 0.7 K/UL (ref 0.3–1)
MONOCYTES NFR BLD: 12.6 % (ref 4–15)
NEUTROPHILS # BLD AUTO: 3.7 K/UL (ref 1.8–7.7)
NEUTROPHILS NFR BLD: 62.7 % (ref 38–73)
NRBC BLD-RTO: 0 /100 WBC
PHOSPHATE SERPL-MCNC: 3.8 MG/DL (ref 2.7–4.5)
PLATELET # BLD AUTO: 180 K/UL (ref 150–450)
PMV BLD AUTO: 9.7 FL (ref 9.2–12.9)
POTASSIUM SERPL-SCNC: 3.7 MMOL/L (ref 3.5–5.1)
PROT SERPL-MCNC: 6 G/DL (ref 6–8.4)
RBC # BLD AUTO: 4 M/UL (ref 4–5.4)
SARS-COV-2 RDRP RESP QL NAA+PROBE: NEGATIVE
SODIUM SERPL-SCNC: 140 MMOL/L (ref 136–145)
TROPONIN I SERPL DL<=0.01 NG/ML-MCNC: 1.2 NG/ML (ref 0–0.03)
TSH SERPL DL<=0.005 MIU/L-ACNC: 2.31 UIU/ML (ref 0.4–4)
WBC # BLD AUTO: 5.88 K/UL (ref 3.9–12.7)

## 2021-09-10 PROCEDURE — 84443 ASSAY THYROID STIM HORMONE: CPT | Performed by: EMERGENCY MEDICINE

## 2021-09-10 PROCEDURE — 84484 ASSAY OF TROPONIN QUANT: CPT | Performed by: EMERGENCY MEDICINE

## 2021-09-10 PROCEDURE — 80053 COMPREHEN METABOLIC PANEL: CPT | Performed by: EMERGENCY MEDICINE

## 2021-09-10 PROCEDURE — 82962 GLUCOSE BLOOD TEST: CPT

## 2021-09-10 PROCEDURE — 36415 COLL VENOUS BLD VENIPUNCTURE: CPT | Performed by: EMERGENCY MEDICINE

## 2021-09-10 PROCEDURE — 99285 EMERGENCY DEPT VISIT HI MDM: CPT | Mod: 25

## 2021-09-10 PROCEDURE — 93010 EKG 12-LEAD: ICD-10-PCS | Mod: 76,,, | Performed by: INTERNAL MEDICINE

## 2021-09-10 PROCEDURE — 83735 ASSAY OF MAGNESIUM: CPT | Performed by: EMERGENCY MEDICINE

## 2021-09-10 PROCEDURE — 85025 COMPLETE CBC W/AUTO DIFF WBC: CPT | Performed by: EMERGENCY MEDICINE

## 2021-09-10 PROCEDURE — 84100 ASSAY OF PHOSPHORUS: CPT | Performed by: EMERGENCY MEDICINE

## 2021-09-10 PROCEDURE — 93010 ELECTROCARDIOGRAM REPORT: CPT | Mod: ,,, | Performed by: INTERNAL MEDICINE

## 2021-09-10 PROCEDURE — G0378 HOSPITAL OBSERVATION PER HR: HCPCS

## 2021-09-10 PROCEDURE — 93005 ELECTROCARDIOGRAM TRACING: CPT

## 2021-09-10 PROCEDURE — U0002 COVID-19 LAB TEST NON-CDC: HCPCS | Performed by: EMERGENCY MEDICINE

## 2021-09-10 PROCEDURE — 96374 THER/PROPH/DIAG INJ IV PUSH: CPT

## 2021-09-10 RX ORDER — LORAZEPAM 0.5 MG/1
0.5 TABLET ORAL 2 TIMES DAILY PRN
Status: DISCONTINUED | OUTPATIENT
Start: 2021-09-10 | End: 2021-09-13 | Stop reason: HOSPADM

## 2021-09-10 RX ORDER — IBUPROFEN 200 MG
24 TABLET ORAL
Status: DISCONTINUED | OUTPATIENT
Start: 2021-09-11 | End: 2021-09-13 | Stop reason: HOSPADM

## 2021-09-10 RX ORDER — ASPIRIN 325 MG
325 TABLET ORAL DAILY
Status: DISCONTINUED | OUTPATIENT
Start: 2021-09-11 | End: 2021-09-13 | Stop reason: HOSPADM

## 2021-09-10 RX ORDER — ONDANSETRON 2 MG/ML
8 INJECTION INTRAMUSCULAR; INTRAVENOUS EVERY 8 HOURS PRN
Status: DISCONTINUED | OUTPATIENT
Start: 2021-09-11 | End: 2021-09-13 | Stop reason: HOSPADM

## 2021-09-10 RX ORDER — ARIPIPRAZOLE 15 MG/1
15 TABLET ORAL DAILY
COMMUNITY
End: 2022-10-26

## 2021-09-10 RX ORDER — ARIPIPRAZOLE 5 MG/1
15 TABLET ORAL DAILY
Status: DISCONTINUED | OUTPATIENT
Start: 2021-09-11 | End: 2021-09-13 | Stop reason: HOSPADM

## 2021-09-10 RX ORDER — METOPROLOL TARTRATE 25 MG/1
25 TABLET, FILM COATED ORAL 2 TIMES DAILY
Status: DISCONTINUED | OUTPATIENT
Start: 2021-09-10 | End: 2021-09-13 | Stop reason: HOSPADM

## 2021-09-10 RX ORDER — TALC
9 POWDER (GRAM) TOPICAL NIGHTLY PRN
Status: DISCONTINUED | OUTPATIENT
Start: 2021-09-11 | End: 2021-09-13 | Stop reason: HOSPADM

## 2021-09-10 RX ORDER — ACETAMINOPHEN 500 MG
1000 TABLET ORAL EVERY 6 HOURS PRN
Status: DISCONTINUED | OUTPATIENT
Start: 2021-09-11 | End: 2021-09-13 | Stop reason: HOSPADM

## 2021-09-10 RX ORDER — IBUPROFEN 200 MG
16 TABLET ORAL
Status: DISCONTINUED | OUTPATIENT
Start: 2021-09-11 | End: 2021-09-13 | Stop reason: HOSPADM

## 2021-09-10 RX ORDER — GABAPENTIN 300 MG/1
300 CAPSULE ORAL NIGHTLY
Status: DISCONTINUED | OUTPATIENT
Start: 2021-09-11 | End: 2021-09-11

## 2021-09-10 RX ORDER — SODIUM CHLORIDE 0.9 % (FLUSH) 0.9 %
10 SYRINGE (ML) INJECTION
Status: DISCONTINUED | OUTPATIENT
Start: 2021-09-11 | End: 2021-09-13 | Stop reason: HOSPADM

## 2021-09-10 RX ORDER — LANOLIN ALCOHOL/MO/W.PET/CERES
800 CREAM (GRAM) TOPICAL
Status: DISCONTINUED | OUTPATIENT
Start: 2021-09-11 | End: 2021-09-13 | Stop reason: HOSPADM

## 2021-09-10 RX ORDER — AMOXICILLIN 250 MG
1 CAPSULE ORAL 2 TIMES DAILY
Status: DISCONTINUED | OUTPATIENT
Start: 2021-09-10 | End: 2021-09-13 | Stop reason: HOSPADM

## 2021-09-10 RX ORDER — GLUCAGON 1 MG
1 KIT INJECTION
Status: DISCONTINUED | OUTPATIENT
Start: 2021-09-11 | End: 2021-09-13 | Stop reason: HOSPADM

## 2021-09-10 RX ORDER — PRAVASTATIN SODIUM 40 MG/1
80 TABLET ORAL DAILY
Status: DISCONTINUED | OUTPATIENT
Start: 2021-09-11 | End: 2021-09-13 | Stop reason: HOSPADM

## 2021-09-11 PROBLEM — R79.89 ELEVATED TROPONIN: Status: ACTIVE | Noted: 2019-10-14

## 2021-09-11 LAB
ALBUMIN SERPL BCP-MCNC: 3.4 G/DL (ref 3.5–5.2)
ALP SERPL-CCNC: 79 U/L (ref 55–135)
ALT SERPL W/O P-5'-P-CCNC: 27 U/L (ref 10–44)
ANION GAP SERPL CALC-SCNC: 9 MMOL/L (ref 8–16)
AST SERPL-CCNC: 30 U/L (ref 10–40)
BASOPHILS # BLD AUTO: 0.04 K/UL (ref 0–0.2)
BASOPHILS NFR BLD: 0.7 % (ref 0–1.9)
BILIRUB SERPL-MCNC: 1.6 MG/DL (ref 0.1–1)
BUN SERPL-MCNC: 19 MG/DL (ref 8–23)
CALCIUM SERPL-MCNC: 9.7 MG/DL (ref 8.7–10.5)
CHLORIDE SERPL-SCNC: 109 MMOL/L (ref 95–110)
CO2 SERPL-SCNC: 23 MMOL/L (ref 23–29)
CREAT SERPL-MCNC: 0.7 MG/DL (ref 0.5–1.4)
DIFFERENTIAL METHOD: ABNORMAL
EOSINOPHIL # BLD AUTO: 0.3 K/UL (ref 0–0.5)
EOSINOPHIL NFR BLD: 5.6 % (ref 0–8)
ERYTHROCYTE [DISTWIDTH] IN BLOOD BY AUTOMATED COUNT: 13.9 % (ref 11.5–14.5)
EST. GFR  (AFRICAN AMERICAN): >60 ML/MIN/1.73 M^2
EST. GFR  (NON AFRICAN AMERICAN): >60 ML/MIN/1.73 M^2
GLUCOSE SERPL-MCNC: 97 MG/DL (ref 70–110)
HCT VFR BLD AUTO: 39.3 % (ref 37–48.5)
HGB BLD-MCNC: 12.9 G/DL (ref 12–16)
IMM GRANULOCYTES # BLD AUTO: 0.01 K/UL (ref 0–0.04)
IMM GRANULOCYTES NFR BLD AUTO: 0.2 % (ref 0–0.5)
LYMPHOCYTES # BLD AUTO: 1.1 K/UL (ref 1–4.8)
LYMPHOCYTES NFR BLD: 18 % (ref 18–48)
MAGNESIUM SERPL-MCNC: 1.9 MG/DL (ref 1.6–2.6)
MCH RBC QN AUTO: 32.3 PG (ref 27–31)
MCHC RBC AUTO-ENTMCNC: 32.8 G/DL (ref 32–36)
MCV RBC AUTO: 98 FL (ref 82–98)
MONOCYTES # BLD AUTO: 0.9 K/UL (ref 0.3–1)
MONOCYTES NFR BLD: 15.3 % (ref 4–15)
NEUTROPHILS # BLD AUTO: 3.6 K/UL (ref 1.8–7.7)
NEUTROPHILS NFR BLD: 60.2 % (ref 38–73)
NRBC BLD-RTO: 0 /100 WBC
PHOSPHATE SERPL-MCNC: 3.9 MG/DL (ref 2.7–4.5)
PLATELET # BLD AUTO: 163 K/UL (ref 150–450)
PMV BLD AUTO: 9.7 FL (ref 9.2–12.9)
POCT GLUCOSE: 152 MG/DL (ref 70–110)
POCT GLUCOSE: 76 MG/DL (ref 70–110)
POCT GLUCOSE: 88 MG/DL (ref 70–110)
POCT GLUCOSE: 92 MG/DL (ref 70–110)
POTASSIUM SERPL-SCNC: 3.6 MMOL/L (ref 3.5–5.1)
PROT SERPL-MCNC: 5.9 G/DL (ref 6–8.4)
RBC # BLD AUTO: 4 M/UL (ref 4–5.4)
SODIUM SERPL-SCNC: 141 MMOL/L (ref 136–145)
TROPONIN I SERPL DL<=0.01 NG/ML-MCNC: 0.01 NG/ML (ref 0–0.03)
TROPONIN I SERPL DL<=0.01 NG/ML-MCNC: <0.006 NG/ML (ref 0–0.03)
WBC # BLD AUTO: 5.9 K/UL (ref 3.9–12.7)

## 2021-09-11 PROCEDURE — 25000003 PHARM REV CODE 250: Performed by: EMERGENCY MEDICINE

## 2021-09-11 PROCEDURE — 84100 ASSAY OF PHOSPHORUS: CPT | Performed by: NURSE PRACTITIONER

## 2021-09-11 PROCEDURE — 84484 ASSAY OF TROPONIN QUANT: CPT | Mod: 91 | Performed by: NURSE PRACTITIONER

## 2021-09-11 PROCEDURE — 25000003 PHARM REV CODE 250: Performed by: NURSE PRACTITIONER

## 2021-09-11 PROCEDURE — 99214 OFFICE O/P EST MOD 30 MIN: CPT | Mod: ,,, | Performed by: INTERNAL MEDICINE

## 2021-09-11 PROCEDURE — G0378 HOSPITAL OBSERVATION PER HR: HCPCS

## 2021-09-11 PROCEDURE — 92610 EVALUATE SWALLOWING FUNCTION: CPT

## 2021-09-11 PROCEDURE — 25000003 PHARM REV CODE 250: Performed by: HOSPITALIST

## 2021-09-11 PROCEDURE — 85025 COMPLETE CBC W/AUTO DIFF WBC: CPT | Performed by: NURSE PRACTITIONER

## 2021-09-11 PROCEDURE — C9113 INJ PANTOPRAZOLE SODIUM, VIA: HCPCS | Performed by: NURSE PRACTITIONER

## 2021-09-11 PROCEDURE — 99214 PR OFFICE/OUTPT VISIT, EST, LEVL IV, 30-39 MIN: ICD-10-PCS | Mod: ,,, | Performed by: INTERNAL MEDICINE

## 2021-09-11 PROCEDURE — 36415 COLL VENOUS BLD VENIPUNCTURE: CPT | Performed by: NURSE PRACTITIONER

## 2021-09-11 PROCEDURE — 63600175 PHARM REV CODE 636 W HCPCS: Performed by: NURSE PRACTITIONER

## 2021-09-11 PROCEDURE — 83735 ASSAY OF MAGNESIUM: CPT | Performed by: NURSE PRACTITIONER

## 2021-09-11 PROCEDURE — 80053 COMPREHEN METABOLIC PANEL: CPT | Performed by: NURSE PRACTITIONER

## 2021-09-11 RX ORDER — GABAPENTIN 100 MG/1
100 CAPSULE ORAL NIGHTLY
Status: DISCONTINUED | OUTPATIENT
Start: 2021-09-11 | End: 2021-09-13 | Stop reason: HOSPADM

## 2021-09-11 RX ORDER — PANTOPRAZOLE SODIUM 40 MG/10ML
40 INJECTION, POWDER, LYOPHILIZED, FOR SOLUTION INTRAVENOUS DAILY
Status: DISCONTINUED | OUTPATIENT
Start: 2021-09-11 | End: 2021-09-13 | Stop reason: HOSPADM

## 2021-09-11 RX ADMIN — VORTIOXETINE 10 MG: 10 TABLET, FILM COATED ORAL at 09:09

## 2021-09-11 RX ADMIN — PANTOPRAZOLE SODIUM 40 MG: 40 INJECTION, POWDER, FOR SOLUTION INTRAVENOUS at 09:09

## 2021-09-11 RX ADMIN — ASPIRIN 325 MG ORAL TABLET 325 MG: 325 PILL ORAL at 09:09

## 2021-09-11 RX ADMIN — APIXABAN 5 MG: 2.5 TABLET, FILM COATED ORAL at 09:09

## 2021-09-11 RX ADMIN — PRAVASTATIN SODIUM 80 MG: 40 TABLET ORAL at 09:09

## 2021-09-11 RX ADMIN — APIXABAN 5 MG: 2.5 TABLET, FILM COATED ORAL at 08:09

## 2021-09-11 RX ADMIN — DOCUSATE SODIUM 50 MG AND SENNOSIDES 8.6 MG 1 TABLET: 8.6; 5 TABLET, FILM COATED ORAL at 12:09

## 2021-09-11 RX ADMIN — DOCUSATE SODIUM 50 MG AND SENNOSIDES 8.6 MG 1 TABLET: 8.6; 5 TABLET, FILM COATED ORAL at 09:09

## 2021-09-11 RX ADMIN — ARIPIPRAZOLE 15 MG: 5 TABLET ORAL at 09:09

## 2021-09-11 RX ADMIN — GABAPENTIN 100 MG: 100 CAPSULE ORAL at 08:09

## 2021-09-12 ENCOUNTER — PATIENT MESSAGE (OUTPATIENT)
Dept: CARDIOLOGY | Facility: CLINIC | Age: 76
End: 2021-09-12

## 2021-09-12 LAB
ALBUMIN SERPL BCP-MCNC: 3.2 G/DL (ref 3.5–5.2)
ALP SERPL-CCNC: 85 U/L (ref 55–135)
ALT SERPL W/O P-5'-P-CCNC: 27 U/L (ref 10–44)
ANION GAP SERPL CALC-SCNC: 9 MMOL/L (ref 8–16)
AST SERPL-CCNC: 33 U/L (ref 10–40)
BASOPHILS # BLD AUTO: 0.04 K/UL (ref 0–0.2)
BASOPHILS NFR BLD: 0.6 % (ref 0–1.9)
BILIRUB SERPL-MCNC: 1.7 MG/DL (ref 0.1–1)
BUN SERPL-MCNC: 12 MG/DL (ref 8–23)
CALCIUM SERPL-MCNC: 9.5 MG/DL (ref 8.7–10.5)
CHLORIDE SERPL-SCNC: 110 MMOL/L (ref 95–110)
CO2 SERPL-SCNC: 24 MMOL/L (ref 23–29)
CREAT SERPL-MCNC: 0.8 MG/DL (ref 0.5–1.4)
DIFFERENTIAL METHOD: ABNORMAL
EOSINOPHIL # BLD AUTO: 0.4 K/UL (ref 0–0.5)
EOSINOPHIL NFR BLD: 6 % (ref 0–8)
ERYTHROCYTE [DISTWIDTH] IN BLOOD BY AUTOMATED COUNT: 14 % (ref 11.5–14.5)
EST. GFR  (AFRICAN AMERICAN): >60 ML/MIN/1.73 M^2
EST. GFR  (NON AFRICAN AMERICAN): >60 ML/MIN/1.73 M^2
GLUCOSE SERPL-MCNC: 82 MG/DL (ref 70–110)
HCT VFR BLD AUTO: 39 % (ref 37–48.5)
HGB BLD-MCNC: 12.5 G/DL (ref 12–16)
IMM GRANULOCYTES # BLD AUTO: 0.01 K/UL (ref 0–0.04)
IMM GRANULOCYTES NFR BLD AUTO: 0.1 % (ref 0–0.5)
LYMPHOCYTES # BLD AUTO: 0.7 K/UL (ref 1–4.8)
LYMPHOCYTES NFR BLD: 10.7 % (ref 18–48)
MAGNESIUM SERPL-MCNC: 1.9 MG/DL (ref 1.6–2.6)
MCH RBC QN AUTO: 31.2 PG (ref 27–31)
MCHC RBC AUTO-ENTMCNC: 32.1 G/DL (ref 32–36)
MCV RBC AUTO: 97 FL (ref 82–98)
MONOCYTES # BLD AUTO: 0.9 K/UL (ref 0.3–1)
MONOCYTES NFR BLD: 13.6 % (ref 4–15)
NEUTROPHILS # BLD AUTO: 4.6 K/UL (ref 1.8–7.7)
NEUTROPHILS NFR BLD: 69 % (ref 38–73)
NRBC BLD-RTO: 0 /100 WBC
PHOSPHATE SERPL-MCNC: 4.1 MG/DL (ref 2.7–4.5)
PLATELET # BLD AUTO: 175 K/UL (ref 150–450)
PMV BLD AUTO: 9.6 FL (ref 9.2–12.9)
POTASSIUM SERPL-SCNC: 3.9 MMOL/L (ref 3.5–5.1)
PROT SERPL-MCNC: 5.7 G/DL (ref 6–8.4)
RBC # BLD AUTO: 4.01 M/UL (ref 4–5.4)
SODIUM SERPL-SCNC: 143 MMOL/L (ref 136–145)
WBC # BLD AUTO: 6.7 K/UL (ref 3.9–12.7)

## 2021-09-12 PROCEDURE — 84100 ASSAY OF PHOSPHORUS: CPT | Performed by: NURSE PRACTITIONER

## 2021-09-12 PROCEDURE — 96376 TX/PRO/DX INJ SAME DRUG ADON: CPT

## 2021-09-12 PROCEDURE — 80053 COMPREHEN METABOLIC PANEL: CPT | Performed by: NURSE PRACTITIONER

## 2021-09-12 PROCEDURE — 63600175 PHARM REV CODE 636 W HCPCS: Performed by: NURSE PRACTITIONER

## 2021-09-12 PROCEDURE — 25000003 PHARM REV CODE 250: Performed by: EMERGENCY MEDICINE

## 2021-09-12 PROCEDURE — 25000003 PHARM REV CODE 250: Performed by: HOSPITALIST

## 2021-09-12 PROCEDURE — 85025 COMPLETE CBC W/AUTO DIFF WBC: CPT | Performed by: NURSE PRACTITIONER

## 2021-09-12 PROCEDURE — 25000003 PHARM REV CODE 250: Performed by: NURSE PRACTITIONER

## 2021-09-12 PROCEDURE — 83735 ASSAY OF MAGNESIUM: CPT | Performed by: NURSE PRACTITIONER

## 2021-09-12 PROCEDURE — 36415 COLL VENOUS BLD VENIPUNCTURE: CPT | Performed by: NURSE PRACTITIONER

## 2021-09-12 PROCEDURE — G0378 HOSPITAL OBSERVATION PER HR: HCPCS

## 2021-09-12 PROCEDURE — C9113 INJ PANTOPRAZOLE SODIUM, VIA: HCPCS | Performed by: NURSE PRACTITIONER

## 2021-09-12 RX ADMIN — METOPROLOL TARTRATE 25 MG: 25 TABLET, FILM COATED ORAL at 10:09

## 2021-09-12 RX ADMIN — DOCUSATE SODIUM 50 MG AND SENNOSIDES 8.6 MG 1 TABLET: 8.6; 5 TABLET, FILM COATED ORAL at 10:09

## 2021-09-12 RX ADMIN — ASPIRIN 325 MG ORAL TABLET 325 MG: 325 PILL ORAL at 10:09

## 2021-09-12 RX ADMIN — VORTIOXETINE 10 MG: 10 TABLET, FILM COATED ORAL at 08:09

## 2021-09-12 RX ADMIN — ARIPIPRAZOLE 15 MG: 5 TABLET ORAL at 10:09

## 2021-09-12 RX ADMIN — PANTOPRAZOLE SODIUM 40 MG: 40 INJECTION, POWDER, FOR SOLUTION INTRAVENOUS at 10:09

## 2021-09-12 RX ADMIN — PRAVASTATIN SODIUM 80 MG: 40 TABLET ORAL at 10:09

## 2021-09-12 RX ADMIN — GABAPENTIN 100 MG: 100 CAPSULE ORAL at 08:09

## 2021-09-12 RX ADMIN — APIXABAN 5 MG: 2.5 TABLET, FILM COATED ORAL at 10:09

## 2021-09-12 RX ADMIN — APIXABAN 5 MG: 2.5 TABLET, FILM COATED ORAL at 08:09

## 2021-09-13 VITALS
DIASTOLIC BLOOD PRESSURE: 61 MMHG | SYSTOLIC BLOOD PRESSURE: 132 MMHG | OXYGEN SATURATION: 100 % | HEIGHT: 67 IN | TEMPERATURE: 97 F | HEART RATE: 59 BPM | RESPIRATION RATE: 18 BRPM | WEIGHT: 111.56 LBS | BODY MASS INDEX: 17.51 KG/M2

## 2021-09-13 LAB
ALBUMIN SERPL BCP-MCNC: 3.3 G/DL (ref 3.5–5.2)
ALP SERPL-CCNC: 93 U/L (ref 55–135)
ALT SERPL W/O P-5'-P-CCNC: 27 U/L (ref 10–44)
ANION GAP SERPL CALC-SCNC: 9 MMOL/L (ref 8–16)
AST SERPL-CCNC: 34 U/L (ref 10–40)
BASOPHILS # BLD AUTO: 0.04 K/UL (ref 0–0.2)
BASOPHILS NFR BLD: 0.7 % (ref 0–1.9)
BILIRUB SERPL-MCNC: 1.8 MG/DL (ref 0.1–1)
BUN SERPL-MCNC: 10 MG/DL (ref 8–23)
CALCIUM SERPL-MCNC: 9.8 MG/DL (ref 8.7–10.5)
CHLORIDE SERPL-SCNC: 110 MMOL/L (ref 95–110)
CO2 SERPL-SCNC: 24 MMOL/L (ref 23–29)
CREAT SERPL-MCNC: 0.8 MG/DL (ref 0.5–1.4)
CV STRESS BASE HR: 69 BPM
DIASTOLIC BLOOD PRESSURE: 93 MMHG
DIFFERENTIAL METHOD: ABNORMAL
EOSINOPHIL # BLD AUTO: 0.4 K/UL (ref 0–0.5)
EOSINOPHIL NFR BLD: 6.2 % (ref 0–8)
ERYTHROCYTE [DISTWIDTH] IN BLOOD BY AUTOMATED COUNT: 14.1 % (ref 11.5–14.5)
EST. GFR  (AFRICAN AMERICAN): >60 ML/MIN/1.73 M^2
EST. GFR  (NON AFRICAN AMERICAN): >60 ML/MIN/1.73 M^2
GLUCOSE SERPL-MCNC: 86 MG/DL (ref 70–110)
HCT VFR BLD AUTO: 41.4 % (ref 37–48.5)
HGB BLD-MCNC: 13.4 G/DL (ref 12–16)
IMM GRANULOCYTES # BLD AUTO: 0.03 K/UL (ref 0–0.04)
IMM GRANULOCYTES NFR BLD AUTO: 0.5 % (ref 0–0.5)
LYMPHOCYTES # BLD AUTO: 1 K/UL (ref 1–4.8)
LYMPHOCYTES NFR BLD: 16.4 % (ref 18–48)
MAGNESIUM SERPL-MCNC: 1.7 MG/DL (ref 1.6–2.6)
MCH RBC QN AUTO: 31.5 PG (ref 27–31)
MCHC RBC AUTO-ENTMCNC: 32.4 G/DL (ref 32–36)
MCV RBC AUTO: 97 FL (ref 82–98)
MONOCYTES # BLD AUTO: 0.9 K/UL (ref 0.3–1)
MONOCYTES NFR BLD: 15.2 % (ref 4–15)
NEUTROPHILS # BLD AUTO: 3.7 K/UL (ref 1.8–7.7)
NEUTROPHILS NFR BLD: 61 % (ref 38–73)
NRBC BLD-RTO: 0 /100 WBC
OHS CV CPX 85 PERCENT MAX PREDICTED HEART RATE MALE: 117
OHS CV CPX MAX PREDICTED HEART RATE: 137
OHS CV CPX PATIENT IS FEMALE: 1
OHS CV CPX PATIENT IS MALE: 0
OHS CV CPX PEAK DIASTOLIC BLOOD PRESSURE: 94 MMHG
OHS CV CPX PEAK HEAR RATE: 116 BPM
OHS CV CPX PEAK RATE PRESSURE PRODUCT: NORMAL
OHS CV CPX PEAK SYSTOLIC BLOOD PRESSURE: 163 MMHG
OHS CV CPX PERCENT MAX PREDICTED HEART RATE ACHIEVED: 84
OHS CV CPX RATE PRESSURE PRODUCT PRESENTING: NORMAL
PHOSPHATE SERPL-MCNC: 4.1 MG/DL (ref 2.7–4.5)
PLATELET # BLD AUTO: 185 K/UL (ref 150–450)
PMV BLD AUTO: 9.7 FL (ref 9.2–12.9)
POTASSIUM SERPL-SCNC: 4.1 MMOL/L (ref 3.5–5.1)
PROT SERPL-MCNC: 5.9 G/DL (ref 6–8.4)
RBC # BLD AUTO: 4.26 M/UL (ref 4–5.4)
SODIUM SERPL-SCNC: 143 MMOL/L (ref 136–145)
SYSTOLIC BLOOD PRESSURE: 158 MMHG
WBC # BLD AUTO: 5.98 K/UL (ref 3.9–12.7)

## 2021-09-13 PROCEDURE — 99214 OFFICE O/P EST MOD 30 MIN: CPT | Mod: 25,,, | Performed by: INTERNAL MEDICINE

## 2021-09-13 PROCEDURE — 96375 TX/PRO/DX INJ NEW DRUG ADDON: CPT

## 2021-09-13 PROCEDURE — 85025 COMPLETE CBC W/AUTO DIFF WBC: CPT | Performed by: NURSE PRACTITIONER

## 2021-09-13 PROCEDURE — 80053 COMPREHEN METABOLIC PANEL: CPT | Performed by: NURSE PRACTITIONER

## 2021-09-13 PROCEDURE — 99214 PR OFFICE/OUTPT VISIT, EST, LEVL IV, 30-39 MIN: ICD-10-PCS | Mod: 25,,, | Performed by: INTERNAL MEDICINE

## 2021-09-13 PROCEDURE — 83735 ASSAY OF MAGNESIUM: CPT | Performed by: NURSE PRACTITIONER

## 2021-09-13 PROCEDURE — C9113 INJ PANTOPRAZOLE SODIUM, VIA: HCPCS | Performed by: NURSE PRACTITIONER

## 2021-09-13 PROCEDURE — 25000003 PHARM REV CODE 250: Performed by: NURSE PRACTITIONER

## 2021-09-13 PROCEDURE — 25000003 PHARM REV CODE 250: Performed by: EMERGENCY MEDICINE

## 2021-09-13 PROCEDURE — 84100 ASSAY OF PHOSPHORUS: CPT | Performed by: NURSE PRACTITIONER

## 2021-09-13 PROCEDURE — 63600175 PHARM REV CODE 636 W HCPCS: Performed by: STUDENT IN AN ORGANIZED HEALTH CARE EDUCATION/TRAINING PROGRAM

## 2021-09-13 PROCEDURE — 99217 PR OBSERVATION CARE DISCHARGE: CPT | Mod: ,,, | Performed by: STUDENT IN AN ORGANIZED HEALTH CARE EDUCATION/TRAINING PROGRAM

## 2021-09-13 PROCEDURE — 36415 COLL VENOUS BLD VENIPUNCTURE: CPT | Performed by: NURSE PRACTITIONER

## 2021-09-13 PROCEDURE — G0378 HOSPITAL OBSERVATION PER HR: HCPCS

## 2021-09-13 PROCEDURE — 96376 TX/PRO/DX INJ SAME DRUG ADON: CPT

## 2021-09-13 PROCEDURE — 63600175 PHARM REV CODE 636 W HCPCS: Performed by: NURSE PRACTITIONER

## 2021-09-13 PROCEDURE — 99217 PR OBSERVATION CARE DISCHARGE: ICD-10-PCS | Mod: ,,, | Performed by: STUDENT IN AN ORGANIZED HEALTH CARE EDUCATION/TRAINING PROGRAM

## 2021-09-13 RX ORDER — REGADENOSON 0.08 MG/ML
0.4 INJECTION, SOLUTION INTRAVENOUS ONCE
Status: COMPLETED | OUTPATIENT
Start: 2021-09-13 | End: 2021-09-13

## 2021-09-13 RX ORDER — METOPROLOL TARTRATE 25 MG/1
25 TABLET, FILM COATED ORAL 2 TIMES DAILY
Qty: 60 TABLET | Refills: 11 | Status: ON HOLD | OUTPATIENT
Start: 2021-09-13 | End: 2021-10-14 | Stop reason: SDUPTHER

## 2021-09-13 RX ORDER — NAPROXEN SODIUM 220 MG/1
81 TABLET, FILM COATED ORAL DAILY
Qty: 90 TABLET | Refills: 0 | Status: SHIPPED | OUTPATIENT
Start: 2021-09-13 | End: 2021-12-07

## 2021-09-13 RX ADMIN — APIXABAN 5 MG: 2.5 TABLET, FILM COATED ORAL at 10:09

## 2021-09-13 RX ADMIN — DOCUSATE SODIUM 50 MG AND SENNOSIDES 8.6 MG 1 TABLET: 8.6; 5 TABLET, FILM COATED ORAL at 10:09

## 2021-09-13 RX ADMIN — ASPIRIN 325 MG ORAL TABLET 325 MG: 325 PILL ORAL at 10:09

## 2021-09-13 RX ADMIN — REGADENOSON 0.4 MG: 0.08 INJECTION, SOLUTION INTRAVENOUS at 08:09

## 2021-09-13 RX ADMIN — ARIPIPRAZOLE 15 MG: 5 TABLET ORAL at 10:09

## 2021-09-13 RX ADMIN — PRAVASTATIN SODIUM 80 MG: 40 TABLET ORAL at 10:09

## 2021-09-13 RX ADMIN — METOPROLOL TARTRATE 25 MG: 25 TABLET, FILM COATED ORAL at 10:09

## 2021-09-13 RX ADMIN — PANTOPRAZOLE SODIUM 40 MG: 40 INJECTION, POWDER, FOR SOLUTION INTRAVENOUS at 10:09

## 2021-09-14 ENCOUNTER — PATIENT MESSAGE (OUTPATIENT)
Dept: CARDIOLOGY | Facility: CLINIC | Age: 76
End: 2021-09-14

## 2021-09-14 ENCOUNTER — TELEPHONE (OUTPATIENT)
Dept: MEDSURG UNIT | Facility: HOSPITAL | Age: 76
End: 2021-09-14

## 2021-09-14 ENCOUNTER — ANTI-COAG VISIT (OUTPATIENT)
Dept: CARDIOLOGY | Facility: CLINIC | Age: 76
End: 2021-09-14

## 2021-09-14 ENCOUNTER — OFFICE VISIT (OUTPATIENT)
Dept: ELECTROPHYSIOLOGY | Facility: CLINIC | Age: 76
End: 2021-09-14
Payer: MEDICARE

## 2021-09-14 ENCOUNTER — PATIENT MESSAGE (OUTPATIENT)
Dept: FAMILY MEDICINE | Facility: CLINIC | Age: 76
End: 2021-09-14

## 2021-09-14 VITALS
HEIGHT: 67 IN | HEART RATE: 64 BPM | BODY MASS INDEX: 16.92 KG/M2 | WEIGHT: 107.81 LBS | SYSTOLIC BLOOD PRESSURE: 90 MMHG | DIASTOLIC BLOOD PRESSURE: 70 MMHG

## 2021-09-14 DIAGNOSIS — Z86.718 HISTORY OF DVT (DEEP VEIN THROMBOSIS): ICD-10-CM

## 2021-09-14 DIAGNOSIS — Z98.890 S/P ABLATION OF ATRIAL FLUTTER: ICD-10-CM

## 2021-09-14 DIAGNOSIS — G45.9 TIA (TRANSIENT ISCHEMIC ATTACK): Primary | ICD-10-CM

## 2021-09-14 DIAGNOSIS — Z86.73 H/O: CVA (CEREBROVASCULAR ACCIDENT): ICD-10-CM

## 2021-09-14 DIAGNOSIS — I48.0 PAROXYSMAL ATRIAL FIBRILLATION: ICD-10-CM

## 2021-09-14 DIAGNOSIS — E78.5 HYPERLIPIDEMIA, UNSPECIFIED HYPERLIPIDEMIA TYPE: ICD-10-CM

## 2021-09-14 DIAGNOSIS — Z79.01 LONG TERM (CURRENT) USE OF ANTICOAGULANTS: Primary | ICD-10-CM

## 2021-09-14 DIAGNOSIS — I48.0 PAROXYSMAL ATRIAL FIBRILLATION: Primary | ICD-10-CM

## 2021-09-14 DIAGNOSIS — Z86.79 S/P ABLATION OF ATRIAL FLUTTER: ICD-10-CM

## 2021-09-14 DIAGNOSIS — I10 ESSENTIAL HYPERTENSION: ICD-10-CM

## 2021-09-14 PROCEDURE — 1157F PR ADVANCE CARE PLAN OR EQUIV PRESENT IN MEDICAL RECORD: ICD-10-PCS | Mod: S$GLB,,, | Performed by: INTERNAL MEDICINE

## 2021-09-14 PROCEDURE — 1126F PR PAIN SEVERITY QUANTIFIED, NO PAIN PRESENT: ICD-10-PCS | Mod: S$GLB,,, | Performed by: INTERNAL MEDICINE

## 2021-09-14 PROCEDURE — 3288F PR FALLS RISK ASSESSMENT DOCUMENTED: ICD-10-PCS | Mod: S$GLB,,, | Performed by: INTERNAL MEDICINE

## 2021-09-14 PROCEDURE — 99999 PR PBB SHADOW E&M-EST. PATIENT-LVL III: CPT | Mod: PBBFAC,,, | Performed by: INTERNAL MEDICINE

## 2021-09-14 PROCEDURE — 1101F PR PT FALLS ASSESS DOC 0-1 FALLS W/OUT INJ PAST YR: ICD-10-PCS | Mod: S$GLB,,, | Performed by: INTERNAL MEDICINE

## 2021-09-14 PROCEDURE — 93005 RHYTHM STRIP: ICD-10-PCS | Mod: S$GLB,,, | Performed by: INTERNAL MEDICINE

## 2021-09-14 PROCEDURE — 93010 RHYTHM STRIP: ICD-10-PCS | Mod: S$GLB,,, | Performed by: INTERNAL MEDICINE

## 2021-09-14 PROCEDURE — 1101F PT FALLS ASSESS-DOCD LE1/YR: CPT | Mod: S$GLB,,, | Performed by: INTERNAL MEDICINE

## 2021-09-14 PROCEDURE — 99215 OFFICE O/P EST HI 40 MIN: CPT | Mod: S$GLB,,, | Performed by: INTERNAL MEDICINE

## 2021-09-14 PROCEDURE — 99215 PR OFFICE/OUTPT VISIT, EST, LEVL V, 40-54 MIN: ICD-10-PCS | Mod: S$GLB,,, | Performed by: INTERNAL MEDICINE

## 2021-09-14 PROCEDURE — 1159F PR MEDICATION LIST DOCUMENTED IN MEDICAL RECORD: ICD-10-PCS | Mod: S$GLB,,, | Performed by: INTERNAL MEDICINE

## 2021-09-14 PROCEDURE — 1126F AMNT PAIN NOTED NONE PRSNT: CPT | Mod: S$GLB,,, | Performed by: INTERNAL MEDICINE

## 2021-09-14 PROCEDURE — 99999 PR PBB SHADOW E&M-EST. PATIENT-LVL III: ICD-10-PCS | Mod: PBBFAC,,, | Performed by: INTERNAL MEDICINE

## 2021-09-14 PROCEDURE — 3078F DIAST BP <80 MM HG: CPT | Mod: S$GLB,,, | Performed by: INTERNAL MEDICINE

## 2021-09-14 PROCEDURE — 1157F ADVNC CARE PLAN IN RCRD: CPT | Mod: S$GLB,,, | Performed by: INTERNAL MEDICINE

## 2021-09-14 PROCEDURE — 1159F MED LIST DOCD IN RCRD: CPT | Mod: S$GLB,,, | Performed by: INTERNAL MEDICINE

## 2021-09-14 PROCEDURE — 3288F FALL RISK ASSESSMENT DOCD: CPT | Mod: S$GLB,,, | Performed by: INTERNAL MEDICINE

## 2021-09-14 PROCEDURE — 3074F SYST BP LT 130 MM HG: CPT | Mod: S$GLB,,, | Performed by: INTERNAL MEDICINE

## 2021-09-14 PROCEDURE — 93010 ELECTROCARDIOGRAM REPORT: CPT | Mod: S$GLB,,, | Performed by: INTERNAL MEDICINE

## 2021-09-14 PROCEDURE — 3074F PR MOST RECENT SYSTOLIC BLOOD PRESSURE < 130 MM HG: ICD-10-PCS | Mod: S$GLB,,, | Performed by: INTERNAL MEDICINE

## 2021-09-14 PROCEDURE — 3078F PR MOST RECENT DIASTOLIC BLOOD PRESSURE < 80 MM HG: ICD-10-PCS | Mod: S$GLB,,, | Performed by: INTERNAL MEDICINE

## 2021-09-14 PROCEDURE — 93005 ELECTROCARDIOGRAM TRACING: CPT | Mod: S$GLB,,, | Performed by: INTERNAL MEDICINE

## 2021-09-14 RX ORDER — WARFARIN SODIUM 5 MG/1
5 TABLET ORAL DAILY
Qty: 30 TABLET | Refills: 11 | Status: SHIPPED | OUTPATIENT
Start: 2021-09-14 | End: 2021-09-22 | Stop reason: DRUGHIGH

## 2021-09-15 ENCOUNTER — PATIENT MESSAGE (OUTPATIENT)
Dept: FAMILY MEDICINE | Facility: CLINIC | Age: 76
End: 2021-09-15

## 2021-09-17 ENCOUNTER — ANTI-COAG VISIT (OUTPATIENT)
Dept: CARDIOLOGY | Facility: CLINIC | Age: 76
End: 2021-09-17
Payer: MEDICARE

## 2021-09-17 ENCOUNTER — LAB VISIT (OUTPATIENT)
Dept: LAB | Facility: HOSPITAL | Age: 76
End: 2021-09-17
Attending: INTERNAL MEDICINE
Payer: MEDICARE

## 2021-09-17 DIAGNOSIS — I48.0 PAROXYSMAL ATRIAL FIBRILLATION: Primary | ICD-10-CM

## 2021-09-17 DIAGNOSIS — Z79.01 LONG TERM (CURRENT) USE OF ANTICOAGULANTS: ICD-10-CM

## 2021-09-17 DIAGNOSIS — I48.0 PAROXYSMAL ATRIAL FIBRILLATION: ICD-10-CM

## 2021-09-17 LAB
INR PPP: >10
INR PPP: >10 (ref 0.8–1.2)
PROTHROMBIN TIME: >100 SEC (ref 9–12.5)

## 2021-09-17 PROCEDURE — 36415 COLL VENOUS BLD VENIPUNCTURE: CPT | Performed by: INTERNAL MEDICINE

## 2021-09-17 PROCEDURE — 93793 ANTICOAG MGMT PT WARFARIN: CPT | Mod: S$GLB,,,

## 2021-09-17 PROCEDURE — 93793 PR ANTICOAGULANT MGMT FOR PT TAKING WARFARIN: ICD-10-PCS | Mod: S$GLB,,,

## 2021-09-17 PROCEDURE — 85610 PROTHROMBIN TIME: CPT | Performed by: INTERNAL MEDICINE

## 2021-09-20 ENCOUNTER — LAB VISIT (OUTPATIENT)
Dept: LAB | Facility: HOSPITAL | Age: 76
End: 2021-09-20
Attending: INTERNAL MEDICINE
Payer: MEDICARE

## 2021-09-20 ENCOUNTER — OFFICE VISIT (OUTPATIENT)
Dept: FAMILY MEDICINE | Facility: CLINIC | Age: 76
End: 2021-09-20
Payer: MEDICARE

## 2021-09-20 ENCOUNTER — ANTI-COAG VISIT (OUTPATIENT)
Dept: CARDIOLOGY | Facility: CLINIC | Age: 76
End: 2021-09-20
Payer: MEDICARE

## 2021-09-20 VITALS
DIASTOLIC BLOOD PRESSURE: 62 MMHG | TEMPERATURE: 98 F | HEIGHT: 67 IN | BODY MASS INDEX: 17.1 KG/M2 | WEIGHT: 108.94 LBS | SYSTOLIC BLOOD PRESSURE: 102 MMHG | OXYGEN SATURATION: 97 % | HEART RATE: 65 BPM

## 2021-09-20 DIAGNOSIS — I48.0 PAROXYSMAL ATRIAL FIBRILLATION: ICD-10-CM

## 2021-09-20 DIAGNOSIS — Z79.01 LONG TERM (CURRENT) USE OF ANTICOAGULANTS: ICD-10-CM

## 2021-09-20 DIAGNOSIS — I48.0 PAROXYSMAL ATRIAL FIBRILLATION: Primary | ICD-10-CM

## 2021-09-20 LAB
INR PPP: >10
INR PPP: >10 (ref 0.8–1.2)
PROTHROMBIN TIME: >100 SEC (ref 9–12.5)

## 2021-09-20 PROCEDURE — 99999 PR PBB SHADOW E&M-EST. PATIENT-LVL IV: CPT | Mod: PBBFAC,,, | Performed by: NURSE PRACTITIONER

## 2021-09-20 PROCEDURE — 3078F PR MOST RECENT DIASTOLIC BLOOD PRESSURE < 80 MM HG: ICD-10-PCS | Mod: S$GLB,,, | Performed by: NURSE PRACTITIONER

## 2021-09-20 PROCEDURE — 1157F PR ADVANCE CARE PLAN OR EQUIV PRESENT IN MEDICAL RECORD: ICD-10-PCS | Mod: S$GLB,,, | Performed by: NURSE PRACTITIONER

## 2021-09-20 PROCEDURE — 99213 PR OFFICE/OUTPT VISIT, EST, LEVL III, 20-29 MIN: ICD-10-PCS | Mod: S$GLB,,, | Performed by: NURSE PRACTITIONER

## 2021-09-20 PROCEDURE — 1160F PR REVIEW ALL MEDS BY PRESCRIBER/CLIN PHARMACIST DOCUMENTED: ICD-10-PCS | Mod: S$GLB,,, | Performed by: NURSE PRACTITIONER

## 2021-09-20 PROCEDURE — 99999 PR PBB SHADOW E&M-EST. PATIENT-LVL IV: ICD-10-PCS | Mod: PBBFAC,,, | Performed by: NURSE PRACTITIONER

## 2021-09-20 PROCEDURE — 3288F FALL RISK ASSESSMENT DOCD: CPT | Mod: S$GLB,,, | Performed by: NURSE PRACTITIONER

## 2021-09-20 PROCEDURE — 1101F PT FALLS ASSESS-DOCD LE1/YR: CPT | Mod: S$GLB,,, | Performed by: NURSE PRACTITIONER

## 2021-09-20 PROCEDURE — 1126F AMNT PAIN NOTED NONE PRSNT: CPT | Mod: S$GLB,,, | Performed by: NURSE PRACTITIONER

## 2021-09-20 PROCEDURE — 93793 PR ANTICOAGULANT MGMT FOR PT TAKING WARFARIN: ICD-10-PCS | Mod: S$GLB,,,

## 2021-09-20 PROCEDURE — 1159F MED LIST DOCD IN RCRD: CPT | Mod: S$GLB,,, | Performed by: NURSE PRACTITIONER

## 2021-09-20 PROCEDURE — 1159F PR MEDICATION LIST DOCUMENTED IN MEDICAL RECORD: ICD-10-PCS | Mod: S$GLB,,, | Performed by: NURSE PRACTITIONER

## 2021-09-20 PROCEDURE — 93793 ANTICOAG MGMT PT WARFARIN: CPT | Mod: S$GLB,,,

## 2021-09-20 PROCEDURE — 1126F PR PAIN SEVERITY QUANTIFIED, NO PAIN PRESENT: ICD-10-PCS | Mod: S$GLB,,, | Performed by: NURSE PRACTITIONER

## 2021-09-20 PROCEDURE — 3074F PR MOST RECENT SYSTOLIC BLOOD PRESSURE < 130 MM HG: ICD-10-PCS | Mod: S$GLB,,, | Performed by: NURSE PRACTITIONER

## 2021-09-20 PROCEDURE — 1160F RVW MEDS BY RX/DR IN RCRD: CPT | Mod: S$GLB,,, | Performed by: NURSE PRACTITIONER

## 2021-09-20 PROCEDURE — 3074F SYST BP LT 130 MM HG: CPT | Mod: S$GLB,,, | Performed by: NURSE PRACTITIONER

## 2021-09-20 PROCEDURE — 1157F ADVNC CARE PLAN IN RCRD: CPT | Mod: S$GLB,,, | Performed by: NURSE PRACTITIONER

## 2021-09-20 PROCEDURE — 85610 PROTHROMBIN TIME: CPT | Performed by: INTERNAL MEDICINE

## 2021-09-20 PROCEDURE — 1101F PR PT FALLS ASSESS DOC 0-1 FALLS W/OUT INJ PAST YR: ICD-10-PCS | Mod: S$GLB,,, | Performed by: NURSE PRACTITIONER

## 2021-09-20 PROCEDURE — 3078F DIAST BP <80 MM HG: CPT | Mod: S$GLB,,, | Performed by: NURSE PRACTITIONER

## 2021-09-20 PROCEDURE — 99213 OFFICE O/P EST LOW 20 MIN: CPT | Mod: S$GLB,,, | Performed by: NURSE PRACTITIONER

## 2021-09-20 PROCEDURE — 36415 COLL VENOUS BLD VENIPUNCTURE: CPT | Performed by: INTERNAL MEDICINE

## 2021-09-20 PROCEDURE — 3288F PR FALLS RISK ASSESSMENT DOCUMENTED: ICD-10-PCS | Mod: S$GLB,,, | Performed by: NURSE PRACTITIONER

## 2021-09-21 ENCOUNTER — ANTI-COAG VISIT (OUTPATIENT)
Dept: CARDIOLOGY | Facility: CLINIC | Age: 76
End: 2021-09-21
Payer: MEDICARE

## 2021-09-21 ENCOUNTER — HOSPITAL ENCOUNTER (EMERGENCY)
Facility: HOSPITAL | Age: 76
Discharge: HOME | End: 2021-09-21
Attending: EMERGENCY MEDICINE
Payer: MEDICARE

## 2021-09-21 VITALS
DIASTOLIC BLOOD PRESSURE: 67 MMHG | RESPIRATION RATE: 16 BRPM | OXYGEN SATURATION: 99 % | TEMPERATURE: 98 F | HEIGHT: 67 IN | WEIGHT: 108 LBS | HEART RATE: 69 BPM | SYSTOLIC BLOOD PRESSURE: 146 MMHG | BODY MASS INDEX: 16.95 KG/M2

## 2021-09-21 DIAGNOSIS — Z79.01 LONG TERM (CURRENT) USE OF ANTICOAGULANTS: ICD-10-CM

## 2021-09-21 DIAGNOSIS — R79.1 ELEVATED INR: Primary | ICD-10-CM

## 2021-09-21 DIAGNOSIS — I48.0 PAROXYSMAL ATRIAL FIBRILLATION: Primary | ICD-10-CM

## 2021-09-21 LAB — INR PPP: >10

## 2021-09-21 PROCEDURE — 63600175 PHARM REV CODE 636 W HCPCS: Performed by: EMERGENCY MEDICINE

## 2021-09-21 PROCEDURE — 93793 PR ANTICOAGULANT MGMT FOR PT TAKING WARFARIN: ICD-10-PCS | Mod: S$GLB,,,

## 2021-09-21 PROCEDURE — 93793 ANTICOAG MGMT PT WARFARIN: CPT | Mod: S$GLB,,,

## 2021-09-21 PROCEDURE — 99283 EMERGENCY DEPT VISIT LOW MDM: CPT

## 2021-09-21 PROCEDURE — 25000003 PHARM REV CODE 250: Performed by: EMERGENCY MEDICINE

## 2021-09-21 RX ADMIN — PHYTONADIONE 2.5 MG: 10 INJECTION, EMULSION INTRAMUSCULAR; INTRAVENOUS; SUBCUTANEOUS at 02:09

## 2021-09-22 ENCOUNTER — LAB VISIT (OUTPATIENT)
Dept: LAB | Facility: HOSPITAL | Age: 76
End: 2021-09-22
Attending: INTERNAL MEDICINE
Payer: MEDICARE

## 2021-09-22 ENCOUNTER — ANTI-COAG VISIT (OUTPATIENT)
Dept: CARDIOLOGY | Facility: CLINIC | Age: 76
End: 2021-09-22
Payer: MEDICARE

## 2021-09-22 DIAGNOSIS — Z79.01 LONG TERM (CURRENT) USE OF ANTICOAGULANTS: Primary | ICD-10-CM

## 2021-09-22 DIAGNOSIS — I48.0 PAROXYSMAL ATRIAL FIBRILLATION: ICD-10-CM

## 2021-09-22 DIAGNOSIS — Z79.01 LONG TERM (CURRENT) USE OF ANTICOAGULANTS: ICD-10-CM

## 2021-09-22 LAB
INR PPP: 2.1 (ref 0.8–1.2)
PROTHROMBIN TIME: 21.7 SEC (ref 9–12.5)

## 2021-09-22 PROCEDURE — 85610 PROTHROMBIN TIME: CPT | Performed by: INTERNAL MEDICINE

## 2021-09-22 PROCEDURE — 93793 ANTICOAG MGMT PT WARFARIN: CPT | Mod: S$GLB,,,

## 2021-09-22 PROCEDURE — 36415 COLL VENOUS BLD VENIPUNCTURE: CPT | Performed by: INTERNAL MEDICINE

## 2021-09-22 PROCEDURE — 93793 PR ANTICOAGULANT MGMT FOR PT TAKING WARFARIN: ICD-10-PCS | Mod: S$GLB,,,

## 2021-09-22 RX ORDER — WARFARIN 1 MG/1
TABLET ORAL
Qty: 30 TABLET | Refills: 0 | Status: ON HOLD | OUTPATIENT
Start: 2021-09-22 | End: 2021-10-15 | Stop reason: SDUPTHER

## 2021-09-23 ENCOUNTER — PATIENT MESSAGE (OUTPATIENT)
Dept: ELECTROPHYSIOLOGY | Facility: CLINIC | Age: 76
End: 2021-09-23

## 2021-09-24 ENCOUNTER — LAB VISIT (OUTPATIENT)
Dept: LAB | Facility: HOSPITAL | Age: 76
End: 2021-09-24
Attending: INTERNAL MEDICINE
Payer: MEDICARE

## 2021-09-24 ENCOUNTER — ANTI-COAG VISIT (OUTPATIENT)
Dept: CARDIOLOGY | Facility: CLINIC | Age: 76
End: 2021-09-24
Payer: MEDICARE

## 2021-09-24 ENCOUNTER — PATIENT MESSAGE (OUTPATIENT)
Dept: ELECTROPHYSIOLOGY | Facility: CLINIC | Age: 76
End: 2021-09-24

## 2021-09-24 ENCOUNTER — TELEPHONE (OUTPATIENT)
Dept: ELECTROPHYSIOLOGY | Facility: CLINIC | Age: 76
End: 2021-09-24

## 2021-09-24 DIAGNOSIS — Z79.01 LONG TERM (CURRENT) USE OF ANTICOAGULANTS: ICD-10-CM

## 2021-09-24 DIAGNOSIS — Z01.812 ENCOUNTER FOR PRE-OPERATIVE LABORATORY TESTING: ICD-10-CM

## 2021-09-24 DIAGNOSIS — I48.0 PAROXYSMAL ATRIAL FIBRILLATION: Primary | ICD-10-CM

## 2021-09-24 DIAGNOSIS — I48.0 PAROXYSMAL ATRIAL FIBRILLATION: ICD-10-CM

## 2021-09-24 DIAGNOSIS — I49.8 ATRIAL ARRHYTHMIA: ICD-10-CM

## 2021-09-24 LAB
INR PPP: 2.4 (ref 0.8–1.2)
PROTHROMBIN TIME: 24.6 SEC (ref 9–12.5)

## 2021-09-24 PROCEDURE — 93793 PR ANTICOAGULANT MGMT FOR PT TAKING WARFARIN: ICD-10-PCS | Mod: S$GLB,,,

## 2021-09-24 PROCEDURE — 36415 COLL VENOUS BLD VENIPUNCTURE: CPT | Performed by: INTERNAL MEDICINE

## 2021-09-24 PROCEDURE — 85610 PROTHROMBIN TIME: CPT | Performed by: INTERNAL MEDICINE

## 2021-09-24 PROCEDURE — 93793 ANTICOAG MGMT PT WARFARIN: CPT | Mod: S$GLB,,,

## 2021-09-28 ENCOUNTER — ANTI-COAG VISIT (OUTPATIENT)
Dept: CARDIOLOGY | Facility: CLINIC | Age: 76
End: 2021-09-28
Payer: MEDICARE

## 2021-09-28 ENCOUNTER — LAB VISIT (OUTPATIENT)
Dept: LAB | Facility: HOSPITAL | Age: 76
End: 2021-09-28
Attending: INTERNAL MEDICINE
Payer: MEDICARE

## 2021-09-28 DIAGNOSIS — Z79.01 LONG TERM (CURRENT) USE OF ANTICOAGULANTS: ICD-10-CM

## 2021-09-28 DIAGNOSIS — I48.0 PAROXYSMAL ATRIAL FIBRILLATION: Primary | ICD-10-CM

## 2021-09-28 DIAGNOSIS — I48.0 PAROXYSMAL ATRIAL FIBRILLATION: ICD-10-CM

## 2021-09-28 LAB
INR PPP: 2.4 (ref 0.8–1.2)
PROTHROMBIN TIME: 24 SEC (ref 9–12.5)

## 2021-09-28 PROCEDURE — 93793 PR ANTICOAGULANT MGMT FOR PT TAKING WARFARIN: ICD-10-PCS | Mod: S$GLB,,,

## 2021-09-28 PROCEDURE — 93793 ANTICOAG MGMT PT WARFARIN: CPT | Mod: S$GLB,,,

## 2021-09-28 PROCEDURE — 36415 COLL VENOUS BLD VENIPUNCTURE: CPT | Performed by: INTERNAL MEDICINE

## 2021-09-28 PROCEDURE — 85610 PROTHROMBIN TIME: CPT | Performed by: INTERNAL MEDICINE

## 2021-09-30 ENCOUNTER — EXTERNAL CHRONIC CARE MANAGEMENT (OUTPATIENT)
Dept: PRIMARY CARE CLINIC | Facility: CLINIC | Age: 76
End: 2021-09-30
Payer: MEDICARE

## 2021-09-30 PROCEDURE — 99490 PR CHRONIC CARE MGMT, 1ST 20 MIN: ICD-10-PCS | Mod: S$GLB,,, | Performed by: FAMILY MEDICINE

## 2021-09-30 PROCEDURE — 99490 CHRNC CARE MGMT STAFF 1ST 20: CPT | Mod: S$GLB,,, | Performed by: FAMILY MEDICINE

## 2021-10-04 ENCOUNTER — ANTI-COAG VISIT (OUTPATIENT)
Dept: CARDIOLOGY | Facility: CLINIC | Age: 76
End: 2021-10-04
Payer: MEDICARE

## 2021-10-04 ENCOUNTER — LAB VISIT (OUTPATIENT)
Dept: LAB | Facility: HOSPITAL | Age: 76
End: 2021-10-04
Attending: INTERNAL MEDICINE
Payer: MEDICARE

## 2021-10-04 DIAGNOSIS — I48.0 PAROXYSMAL ATRIAL FIBRILLATION: ICD-10-CM

## 2021-10-04 DIAGNOSIS — Z79.01 LONG TERM (CURRENT) USE OF ANTICOAGULANTS: ICD-10-CM

## 2021-10-04 DIAGNOSIS — I48.0 PAROXYSMAL ATRIAL FIBRILLATION: Primary | ICD-10-CM

## 2021-10-04 LAB
INR PPP: 1.9 (ref 0.8–1.2)
PROTHROMBIN TIME: 19 SEC (ref 9–12.5)

## 2021-10-04 PROCEDURE — 93793 ANTICOAG MGMT PT WARFARIN: CPT | Mod: S$GLB,,,

## 2021-10-04 PROCEDURE — 93793 PR ANTICOAGULANT MGMT FOR PT TAKING WARFARIN: ICD-10-PCS | Mod: S$GLB,,,

## 2021-10-04 PROCEDURE — 36415 COLL VENOUS BLD VENIPUNCTURE: CPT | Performed by: INTERNAL MEDICINE

## 2021-10-04 PROCEDURE — 85610 PROTHROMBIN TIME: CPT | Performed by: INTERNAL MEDICINE

## 2021-10-07 ENCOUNTER — LAB VISIT (OUTPATIENT)
Dept: LAB | Facility: HOSPITAL | Age: 76
End: 2021-10-07
Attending: INTERNAL MEDICINE
Payer: MEDICARE

## 2021-10-07 ENCOUNTER — ANTI-COAG VISIT (OUTPATIENT)
Dept: CARDIOLOGY | Facility: CLINIC | Age: 76
End: 2021-10-07
Payer: MEDICARE

## 2021-10-07 DIAGNOSIS — Z79.01 LONG TERM (CURRENT) USE OF ANTICOAGULANTS: ICD-10-CM

## 2021-10-07 DIAGNOSIS — I48.0 PAROXYSMAL ATRIAL FIBRILLATION: Primary | ICD-10-CM

## 2021-10-07 DIAGNOSIS — I48.0 PAROXYSMAL ATRIAL FIBRILLATION: ICD-10-CM

## 2021-10-07 LAB
INR PPP: 7.06
INR PPP: 7.1 (ref 0.8–1.2)
PROTHROMBIN TIME: 66.5 SEC (ref 9–12.5)

## 2021-10-07 PROCEDURE — 85610 PROTHROMBIN TIME: CPT | Performed by: INTERNAL MEDICINE

## 2021-10-07 PROCEDURE — 36415 COLL VENOUS BLD VENIPUNCTURE: CPT | Performed by: INTERNAL MEDICINE

## 2021-10-07 PROCEDURE — 93793 ANTICOAG MGMT PT WARFARIN: CPT | Mod: S$GLB,,,

## 2021-10-07 PROCEDURE — 93793 PR ANTICOAGULANT MGMT FOR PT TAKING WARFARIN: ICD-10-PCS | Mod: S$GLB,,,

## 2021-10-08 ENCOUNTER — LAB VISIT (OUTPATIENT)
Dept: LAB | Facility: HOSPITAL | Age: 76
End: 2021-10-08
Attending: INTERNAL MEDICINE
Payer: MEDICARE

## 2021-10-08 ENCOUNTER — ANTI-COAG VISIT (OUTPATIENT)
Dept: CARDIOLOGY | Facility: CLINIC | Age: 76
End: 2021-10-08
Payer: MEDICARE

## 2021-10-08 DIAGNOSIS — I48.0 PAROXYSMAL ATRIAL FIBRILLATION: ICD-10-CM

## 2021-10-08 DIAGNOSIS — Z79.01 LONG TERM (CURRENT) USE OF ANTICOAGULANTS: ICD-10-CM

## 2021-10-08 DIAGNOSIS — I48.0 PAROXYSMAL ATRIAL FIBRILLATION: Primary | ICD-10-CM

## 2021-10-08 LAB
INR PPP: 6.7 (ref 0.8–1.2)
INR PPP: 6.73
PROTHROMBIN TIME: 63.6 SEC (ref 9–12.5)

## 2021-10-08 PROCEDURE — 36415 COLL VENOUS BLD VENIPUNCTURE: CPT | Performed by: INTERNAL MEDICINE

## 2021-10-08 PROCEDURE — 93793 PR ANTICOAGULANT MGMT FOR PT TAKING WARFARIN: ICD-10-PCS | Mod: S$GLB,,,

## 2021-10-08 PROCEDURE — 93793 ANTICOAG MGMT PT WARFARIN: CPT | Mod: S$GLB,,,

## 2021-10-08 PROCEDURE — 85610 PROTHROMBIN TIME: CPT | Performed by: INTERNAL MEDICINE

## 2021-10-11 ENCOUNTER — LAB VISIT (OUTPATIENT)
Dept: LAB | Facility: HOSPITAL | Age: 76
End: 2021-10-11
Attending: INTERNAL MEDICINE
Payer: MEDICARE

## 2021-10-11 ENCOUNTER — ANTI-COAG VISIT (OUTPATIENT)
Dept: CARDIOLOGY | Facility: CLINIC | Age: 76
End: 2021-10-11
Payer: MEDICARE

## 2021-10-11 ENCOUNTER — LAB VISIT (OUTPATIENT)
Dept: PRIMARY CARE CLINIC | Facility: CLINIC | Age: 76
End: 2021-10-11
Payer: MEDICARE

## 2021-10-11 DIAGNOSIS — I48.0 PAROXYSMAL ATRIAL FIBRILLATION: ICD-10-CM

## 2021-10-11 DIAGNOSIS — I49.8 ATRIAL ARRHYTHMIA: ICD-10-CM

## 2021-10-11 DIAGNOSIS — I48.0 PAROXYSMAL ATRIAL FIBRILLATION: Primary | ICD-10-CM

## 2021-10-11 DIAGNOSIS — Z01.812 ENCOUNTER FOR PRE-OPERATIVE LABORATORY TESTING: ICD-10-CM

## 2021-10-11 DIAGNOSIS — Z01.818 PRE-OP TESTING: ICD-10-CM

## 2021-10-11 DIAGNOSIS — Z79.01 LONG TERM (CURRENT) USE OF ANTICOAGULANTS: ICD-10-CM

## 2021-10-11 LAB
ANION GAP SERPL CALC-SCNC: 8 MMOL/L (ref 8–16)
APTT BLDCRRT: 36.9 SEC (ref 21–32)
BASOPHILS # BLD AUTO: 0.05 K/UL (ref 0–0.2)
BASOPHILS NFR BLD: 0.7 % (ref 0–1.9)
BUN SERPL-MCNC: 19 MG/DL (ref 8–23)
CALCIUM SERPL-MCNC: 9.8 MG/DL (ref 8.7–10.5)
CHLORIDE SERPL-SCNC: 108 MMOL/L (ref 95–110)
CO2 SERPL-SCNC: 24 MMOL/L (ref 23–29)
CREAT SERPL-MCNC: 0.8 MG/DL (ref 0.5–1.4)
DIFFERENTIAL METHOD: ABNORMAL
EOSINOPHIL # BLD AUTO: 0.3 K/UL (ref 0–0.5)
EOSINOPHIL NFR BLD: 3.9 % (ref 0–8)
ERYTHROCYTE [DISTWIDTH] IN BLOOD BY AUTOMATED COUNT: 13.9 % (ref 11.5–14.5)
EST. GFR  (AFRICAN AMERICAN): >60 ML/MIN/1.73 M^2
EST. GFR  (NON AFRICAN AMERICAN): >60 ML/MIN/1.73 M^2
GLUCOSE SERPL-MCNC: 112 MG/DL (ref 70–110)
HCT VFR BLD AUTO: 44.4 % (ref 37–48.5)
HGB BLD-MCNC: 13.7 G/DL (ref 12–16)
IMM GRANULOCYTES # BLD AUTO: 0.02 K/UL (ref 0–0.04)
IMM GRANULOCYTES NFR BLD AUTO: 0.3 % (ref 0–0.5)
INR PPP: 2.6 (ref 0.8–1.2)
LYMPHOCYTES # BLD AUTO: 0.7 K/UL (ref 1–4.8)
LYMPHOCYTES NFR BLD: 9.5 % (ref 18–48)
MCH RBC QN AUTO: 31 PG (ref 27–31)
MCHC RBC AUTO-ENTMCNC: 30.9 G/DL (ref 32–36)
MCV RBC AUTO: 101 FL (ref 82–98)
MONOCYTES # BLD AUTO: 0.7 K/UL (ref 0.3–1)
MONOCYTES NFR BLD: 9.3 % (ref 4–15)
NEUTROPHILS # BLD AUTO: 5.7 K/UL (ref 1.8–7.7)
NEUTROPHILS NFR BLD: 76.3 % (ref 38–73)
NRBC BLD-RTO: 0 /100 WBC
PLATELET # BLD AUTO: 186 K/UL (ref 150–450)
PMV BLD AUTO: 9.5 FL (ref 9.2–12.9)
POTASSIUM SERPL-SCNC: 4 MMOL/L (ref 3.5–5.1)
PROTHROMBIN TIME: 25.9 SEC (ref 9–12.5)
RBC # BLD AUTO: 4.42 M/UL (ref 4–5.4)
SODIUM SERPL-SCNC: 140 MMOL/L (ref 136–145)
WBC # BLD AUTO: 7.4 K/UL (ref 3.9–12.7)

## 2021-10-11 PROCEDURE — 80048 BASIC METABOLIC PNL TOTAL CA: CPT | Performed by: INTERNAL MEDICINE

## 2021-10-11 PROCEDURE — 93793 PR ANTICOAGULANT MGMT FOR PT TAKING WARFARIN: ICD-10-PCS | Mod: S$GLB,,,

## 2021-10-11 PROCEDURE — 93793 ANTICOAG MGMT PT WARFARIN: CPT | Mod: S$GLB,,,

## 2021-10-11 PROCEDURE — 85025 COMPLETE CBC W/AUTO DIFF WBC: CPT | Performed by: INTERNAL MEDICINE

## 2021-10-11 PROCEDURE — 36415 COLL VENOUS BLD VENIPUNCTURE: CPT | Performed by: INTERNAL MEDICINE

## 2021-10-11 PROCEDURE — 85730 THROMBOPLASTIN TIME PARTIAL: CPT | Performed by: INTERNAL MEDICINE

## 2021-10-11 PROCEDURE — U0003 INFECTIOUS AGENT DETECTION BY NUCLEIC ACID (DNA OR RNA); SEVERE ACUTE RESPIRATORY SYNDROME CORONAVIRUS 2 (SARS-COV-2) (CORONAVIRUS DISEASE [COVID-19]), AMPLIFIED PROBE TECHNIQUE, MAKING USE OF HIGH THROUGHPUT TECHNOLOGIES AS DESCRIBED BY CMS-2020-01-R: HCPCS | Performed by: INTERNAL MEDICINE

## 2021-10-11 PROCEDURE — 85610 PROTHROMBIN TIME: CPT | Performed by: INTERNAL MEDICINE

## 2021-10-11 PROCEDURE — U0005 INFEC AGEN DETEC AMPLI PROBE: HCPCS | Performed by: INTERNAL MEDICINE

## 2021-10-12 LAB
SARS-COV-2 RNA RESP QL NAA+PROBE: NOT DETECTED
SARS-COV-2- CYCLE NUMBER: NORMAL

## 2021-10-13 ENCOUNTER — TELEPHONE (OUTPATIENT)
Dept: ELECTROPHYSIOLOGY | Facility: CLINIC | Age: 76
End: 2021-10-13

## 2021-10-14 ENCOUNTER — HOSPITAL ENCOUNTER (OUTPATIENT)
Facility: HOSPITAL | Age: 76
Discharge: HOME | End: 2021-10-14
Attending: INTERNAL MEDICINE | Admitting: INTERNAL MEDICINE
Payer: MEDICARE

## 2021-10-14 ENCOUNTER — ANTI-COAG VISIT (OUTPATIENT)
Dept: CARDIOLOGY | Facility: CLINIC | Age: 76
End: 2021-10-14
Payer: MEDICARE

## 2021-10-14 ENCOUNTER — ANESTHESIA EVENT (OUTPATIENT)
Dept: MEDSURG UNIT | Facility: HOSPITAL | Age: 76
End: 2021-10-14
Payer: MEDICARE

## 2021-10-14 ENCOUNTER — ANESTHESIA (OUTPATIENT)
Dept: MEDSURG UNIT | Facility: HOSPITAL | Age: 76
End: 2021-10-14
Payer: MEDICARE

## 2021-10-14 VITALS
OXYGEN SATURATION: 97 % | BODY MASS INDEX: 17.11 KG/M2 | DIASTOLIC BLOOD PRESSURE: 70 MMHG | RESPIRATION RATE: 21 BRPM | SYSTOLIC BLOOD PRESSURE: 166 MMHG | HEART RATE: 69 BPM | WEIGHT: 109 LBS | TEMPERATURE: 98 F | HEIGHT: 67 IN

## 2021-10-14 DIAGNOSIS — Z79.01 LONG TERM (CURRENT) USE OF ANTICOAGULANTS: ICD-10-CM

## 2021-10-14 DIAGNOSIS — I48.0 PAROXYSMAL ATRIAL FIBRILLATION: Primary | ICD-10-CM

## 2021-10-14 DIAGNOSIS — Z95.9 CARDIAC DEVICE IN SITU: ICD-10-CM

## 2021-10-14 DIAGNOSIS — I48.0 PAF (PAROXYSMAL ATRIAL FIBRILLATION): ICD-10-CM

## 2021-10-14 DIAGNOSIS — I49.8 ATRIAL ARRHYTHMIA: ICD-10-CM

## 2021-10-14 DIAGNOSIS — I49.9 ARRHYTHMIA: ICD-10-CM

## 2021-10-14 LAB
APTT BLDCRRT: 31.1 SEC (ref 21–32)
INR PPP: 1.7 (ref 0.8–1.2)
PROTHROMBIN TIME: 17.9 SEC (ref 9–12.5)

## 2021-10-14 PROCEDURE — C1883 ADAPT/EXT, PACING/NEURO LEAD: HCPCS | Performed by: INTERNAL MEDICINE

## 2021-10-14 PROCEDURE — D9220A PRA ANESTHESIA: Mod: ANES,,, | Performed by: ANESTHESIOLOGY

## 2021-10-14 PROCEDURE — 93005 ELECTROCARDIOGRAM TRACING: CPT

## 2021-10-14 PROCEDURE — 37000009 HC ANESTHESIA EA ADD 15 MINS: Performed by: INTERNAL MEDICINE

## 2021-10-14 PROCEDURE — 93005 ELECTROCARDIOGRAM TRACING: CPT | Mod: 59

## 2021-10-14 PROCEDURE — 93793 ANTICOAG MGMT PT WARFARIN: CPT | Mod: S$GLB,,,

## 2021-10-14 PROCEDURE — 63600175 PHARM REV CODE 636 W HCPCS: Performed by: NURSE PRACTITIONER

## 2021-10-14 PROCEDURE — 94761 N-INVAS EAR/PLS OXIMETRY MLT: CPT

## 2021-10-14 PROCEDURE — 85610 PROTHROMBIN TIME: CPT | Performed by: NURSE PRACTITIONER

## 2021-10-14 PROCEDURE — C1785 PMKR, DUAL, RATE-RESP: HCPCS | Performed by: INTERNAL MEDICINE

## 2021-10-14 PROCEDURE — D9220A PRA ANESTHESIA: Mod: CRNA,,, | Performed by: STUDENT IN AN ORGANIZED HEALTH CARE EDUCATION/TRAINING PROGRAM

## 2021-10-14 PROCEDURE — D9220A PRA ANESTHESIA: ICD-10-PCS | Mod: ANES,,, | Performed by: ANESTHESIOLOGY

## 2021-10-14 PROCEDURE — 33208 PR INSER HART PACER XVENOUS ATR/VENTR: ICD-10-PCS | Mod: ,,, | Performed by: INTERNAL MEDICINE

## 2021-10-14 PROCEDURE — 93793 PR ANTICOAGULANT MGMT FOR PT TAKING WARFARIN: ICD-10-PCS | Mod: S$GLB,,,

## 2021-10-14 PROCEDURE — 63600175 PHARM REV CODE 636 W HCPCS: Performed by: INTERNAL MEDICINE

## 2021-10-14 PROCEDURE — 85730 THROMBOPLASTIN TIME PARTIAL: CPT | Performed by: NURSE PRACTITIONER

## 2021-10-14 PROCEDURE — 25000003 PHARM REV CODE 250: Performed by: STUDENT IN AN ORGANIZED HEALTH CARE EDUCATION/TRAINING PROGRAM

## 2021-10-14 PROCEDURE — 33208 INSRT HEART PM ATRIAL & VENT: CPT | Mod: KX | Performed by: INTERNAL MEDICINE

## 2021-10-14 PROCEDURE — 63600175 PHARM REV CODE 636 W HCPCS: Performed by: STUDENT IN AN ORGANIZED HEALTH CARE EDUCATION/TRAINING PROGRAM

## 2021-10-14 PROCEDURE — 37000008 HC ANESTHESIA 1ST 15 MINUTES: Performed by: INTERNAL MEDICINE

## 2021-10-14 PROCEDURE — 33208 INSRT HEART PM ATRIAL & VENT: CPT | Mod: ,,, | Performed by: INTERNAL MEDICINE

## 2021-10-14 PROCEDURE — D9220A PRA ANESTHESIA: ICD-10-PCS | Mod: CRNA,,, | Performed by: STUDENT IN AN ORGANIZED HEALTH CARE EDUCATION/TRAINING PROGRAM

## 2021-10-14 PROCEDURE — 25000003 PHARM REV CODE 250: Performed by: INTERNAL MEDICINE

## 2021-10-14 PROCEDURE — 93010 EKG 12-LEAD: ICD-10-PCS | Mod: ,,, | Performed by: INTERNAL MEDICINE

## 2021-10-14 PROCEDURE — 25500020 PHARM REV CODE 255: Performed by: STUDENT IN AN ORGANIZED HEALTH CARE EDUCATION/TRAINING PROGRAM

## 2021-10-14 PROCEDURE — 93010 ELECTROCARDIOGRAM REPORT: CPT | Mod: ,,, | Performed by: INTERNAL MEDICINE

## 2021-10-14 PROCEDURE — C1894 INTRO/SHEATH, NON-LASER: HCPCS | Performed by: INTERNAL MEDICINE

## 2021-10-14 DEVICE — PACING LEAD
Type: IMPLANTABLE DEVICE | Site: HEART | Status: FUNCTIONAL
Brand: TENDRIL MRI™

## 2021-10-14 DEVICE — PULSE GENERATOR
Type: IMPLANTABLE DEVICE | Site: CHEST | Status: FUNCTIONAL
Brand: ASSURITY MRI™

## 2021-10-14 RX ORDER — VANCOMYCIN HYDROCHLORIDE 1 G/20ML
INJECTION, POWDER, LYOPHILIZED, FOR SOLUTION INTRAVENOUS
Status: DISCONTINUED | OUTPATIENT
Start: 2021-10-14 | End: 2021-11-15 | Stop reason: HOSPADM

## 2021-10-14 RX ORDER — PROPOFOL 10 MG/ML
VIAL (ML) INTRAVENOUS
Status: DISCONTINUED | OUTPATIENT
Start: 2021-10-14 | End: 2021-10-14

## 2021-10-14 RX ORDER — VANCOMYCIN HCL IN 5 % DEXTROSE 1G/250ML
1000 PLASTIC BAG, INJECTION (ML) INTRAVENOUS
Status: DISCONTINUED | OUTPATIENT
Start: 2021-10-14 | End: 2021-10-14 | Stop reason: HOSPADM

## 2021-10-14 RX ORDER — METOPROLOL TARTRATE 25 MG/1
25 TABLET, FILM COATED ORAL 2 TIMES DAILY
Qty: 60 TABLET | Refills: 11 | Status: SHIPPED | OUTPATIENT
Start: 2021-10-14 | End: 2022-02-16

## 2021-10-14 RX ORDER — FENTANYL CITRATE 50 UG/ML
25 INJECTION, SOLUTION INTRAMUSCULAR; INTRAVENOUS EVERY 5 MIN PRN
Status: DISCONTINUED | OUTPATIENT
Start: 2021-10-14 | End: 2021-11-15 | Stop reason: HOSPADM

## 2021-10-14 RX ORDER — IODIXANOL 320 MG/ML
INJECTION, SOLUTION INTRAVASCULAR
Status: DISCONTINUED | OUTPATIENT
Start: 2021-10-14 | End: 2021-10-14

## 2021-10-14 RX ORDER — DOXYCYCLINE 100 MG/1
100 CAPSULE ORAL EVERY 12 HOURS
Qty: 10 CAPSULE | Refills: 0 | Status: SHIPPED | OUTPATIENT
Start: 2021-10-14 | End: 2021-10-19

## 2021-10-14 RX ORDER — PROPOFOL 10 MG/ML
VIAL (ML) INTRAVENOUS CONTINUOUS PRN
Status: DISCONTINUED | OUTPATIENT
Start: 2021-10-14 | End: 2021-10-14

## 2021-10-14 RX ORDER — ONDANSETRON 2 MG/ML
4 INJECTION INTRAMUSCULAR; INTRAVENOUS DAILY PRN
Status: DISCONTINUED | OUTPATIENT
Start: 2021-10-14 | End: 2021-11-15 | Stop reason: HOSPADM

## 2021-10-14 RX ORDER — SODIUM CHLORIDE 0.9 G/100ML
IRRIGANT IRRIGATION
Status: DISCONTINUED | OUTPATIENT
Start: 2021-10-14 | End: 2021-11-15 | Stop reason: HOSPADM

## 2021-10-14 RX ORDER — VASOPRESSIN 20 [USP'U]/ML
INJECTION, SOLUTION INTRAMUSCULAR; SUBCUTANEOUS
Status: DISCONTINUED | OUTPATIENT
Start: 2021-10-14 | End: 2021-10-14

## 2021-10-14 RX ORDER — LIDOCAINE HYDROCHLORIDE 20 MG/ML
INJECTION, SOLUTION EPIDURAL; INFILTRATION; INTRACAUDAL; PERINEURAL
Status: DISCONTINUED | OUTPATIENT
Start: 2021-10-14 | End: 2021-11-15 | Stop reason: HOSPADM

## 2021-10-14 RX ORDER — FENTANYL CITRATE 50 UG/ML
INJECTION, SOLUTION INTRAMUSCULAR; INTRAVENOUS
Status: DISCONTINUED | OUTPATIENT
Start: 2021-10-14 | End: 2021-10-14

## 2021-10-14 RX ORDER — BUPIVACAINE HYDROCHLORIDE 2.5 MG/ML
INJECTION, SOLUTION EPIDURAL; INFILTRATION; INTRACAUDAL
Status: DISCONTINUED | OUTPATIENT
Start: 2021-10-14 | End: 2021-11-15 | Stop reason: HOSPADM

## 2021-10-14 RX ADMIN — FENTANYL CITRATE 50 MCG: 50 INJECTION INTRAMUSCULAR; INTRAVENOUS at 03:10

## 2021-10-14 RX ADMIN — VASOPRESSIN 0.5 UNITS: 20 INJECTION, SOLUTION INTRAMUSCULAR; SUBCUTANEOUS at 03:10

## 2021-10-14 RX ADMIN — SODIUM CHLORIDE: 9 INJECTION, SOLUTION INTRAVENOUS at 03:10

## 2021-10-14 RX ADMIN — VANCOMYCIN HYDROCHLORIDE 1000 MG: 1 INJECTION, POWDER, LYOPHILIZED, FOR SOLUTION INTRAVENOUS at 03:10

## 2021-10-14 RX ADMIN — PROPOFOL 100 MCG/KG/MIN: 10 INJECTION, EMULSION INTRAVENOUS at 03:10

## 2021-10-14 RX ADMIN — IODIXANOL 10 ML: 320 INJECTION, SOLUTION INTRAVASCULAR at 03:10

## 2021-10-14 RX ADMIN — VASOPRESSIN 1 UNITS: 20 INJECTION, SOLUTION INTRAMUSCULAR; SUBCUTANEOUS at 04:10

## 2021-10-15 DIAGNOSIS — I48.0 PAROXYSMAL ATRIAL FIBRILLATION: ICD-10-CM

## 2021-10-15 DIAGNOSIS — Z79.01 LONG TERM (CURRENT) USE OF ANTICOAGULANTS: ICD-10-CM

## 2021-10-15 RX ORDER — WARFARIN 1 MG/1
TABLET ORAL
Qty: 30 TABLET | Refills: 0 | Status: SHIPPED | OUTPATIENT
Start: 2021-10-15 | End: 2021-10-28 | Stop reason: ALTCHOICE

## 2021-10-19 ENCOUNTER — ANTI-COAG VISIT (OUTPATIENT)
Dept: CARDIOLOGY | Facility: CLINIC | Age: 76
End: 2021-10-19
Payer: MEDICARE

## 2021-10-19 ENCOUNTER — LAB VISIT (OUTPATIENT)
Dept: LAB | Facility: HOSPITAL | Age: 76
End: 2021-10-19
Attending: INTERNAL MEDICINE
Payer: MEDICARE

## 2021-10-19 DIAGNOSIS — I48.0 PAROXYSMAL ATRIAL FIBRILLATION: ICD-10-CM

## 2021-10-19 DIAGNOSIS — I48.0 PAROXYSMAL ATRIAL FIBRILLATION: Primary | ICD-10-CM

## 2021-10-19 DIAGNOSIS — Z79.01 LONG TERM (CURRENT) USE OF ANTICOAGULANTS: ICD-10-CM

## 2021-10-19 LAB
INR PPP: 1.7 (ref 0.8–1.2)
PROTHROMBIN TIME: 17.2 SEC (ref 9–12.5)

## 2021-10-19 PROCEDURE — 93793 ANTICOAG MGMT PT WARFARIN: CPT | Mod: S$GLB,,,

## 2021-10-19 PROCEDURE — 85610 PROTHROMBIN TIME: CPT | Performed by: INTERNAL MEDICINE

## 2021-10-19 PROCEDURE — 93793 PR ANTICOAGULANT MGMT FOR PT TAKING WARFARIN: ICD-10-PCS | Mod: S$GLB,,,

## 2021-10-19 PROCEDURE — 36415 COLL VENOUS BLD VENIPUNCTURE: CPT | Performed by: INTERNAL MEDICINE

## 2021-10-21 ENCOUNTER — CLINICAL SUPPORT (OUTPATIENT)
Dept: CARDIOLOGY | Facility: HOSPITAL | Age: 76
End: 2021-10-21
Attending: INTERNAL MEDICINE
Payer: MEDICARE

## 2021-10-21 DIAGNOSIS — I49.8 ATRIAL ARRHYTHMIA: ICD-10-CM

## 2021-10-21 DIAGNOSIS — I48.0 PAROXYSMAL ATRIAL FIBRILLATION: ICD-10-CM

## 2021-10-21 DIAGNOSIS — Z01.812 ENCOUNTER FOR PRE-OPERATIVE LABORATORY TESTING: ICD-10-CM

## 2021-10-21 PROCEDURE — 93280 PM DEVICE PROGR EVAL DUAL: CPT

## 2021-10-25 ENCOUNTER — LAB VISIT (OUTPATIENT)
Dept: LAB | Facility: HOSPITAL | Age: 76
End: 2021-10-25
Attending: INTERNAL MEDICINE
Payer: MEDICARE

## 2021-10-25 ENCOUNTER — ANTI-COAG VISIT (OUTPATIENT)
Dept: CARDIOLOGY | Facility: CLINIC | Age: 76
End: 2021-10-25
Payer: MEDICARE

## 2021-10-25 DIAGNOSIS — I48.0 PAROXYSMAL ATRIAL FIBRILLATION: ICD-10-CM

## 2021-10-25 DIAGNOSIS — I48.0 PAROXYSMAL ATRIAL FIBRILLATION: Primary | ICD-10-CM

## 2021-10-25 DIAGNOSIS — Z79.01 LONG TERM (CURRENT) USE OF ANTICOAGULANTS: ICD-10-CM

## 2021-10-25 LAB
INR PPP: 1.3 (ref 0.8–1.2)
PROTHROMBIN TIME: 13.3 SEC (ref 9–12.5)

## 2021-10-25 PROCEDURE — 93793 PR ANTICOAGULANT MGMT FOR PT TAKING WARFARIN: ICD-10-PCS | Mod: S$GLB,,,

## 2021-10-25 PROCEDURE — 93793 ANTICOAG MGMT PT WARFARIN: CPT | Mod: S$GLB,,,

## 2021-10-25 PROCEDURE — 85610 PROTHROMBIN TIME: CPT | Performed by: INTERNAL MEDICINE

## 2021-10-25 PROCEDURE — 36415 COLL VENOUS BLD VENIPUNCTURE: CPT | Performed by: INTERNAL MEDICINE

## 2021-10-26 ENCOUNTER — PATIENT MESSAGE (OUTPATIENT)
Dept: ELECTROPHYSIOLOGY | Facility: CLINIC | Age: 76
End: 2021-10-26
Payer: MEDICARE

## 2021-10-31 ENCOUNTER — EXTERNAL CHRONIC CARE MANAGEMENT (OUTPATIENT)
Dept: PRIMARY CARE CLINIC | Facility: CLINIC | Age: 76
End: 2021-10-31
Payer: MEDICARE

## 2021-10-31 PROCEDURE — 99490 PR CHRONIC CARE MGMT, 1ST 20 MIN: ICD-10-PCS | Mod: S$GLB,,, | Performed by: FAMILY MEDICINE

## 2021-10-31 PROCEDURE — 99490 CHRNC CARE MGMT STAFF 1ST 20: CPT | Mod: S$GLB,,, | Performed by: FAMILY MEDICINE

## 2021-11-15 ENCOUNTER — TELEPHONE (OUTPATIENT)
Dept: CARDIOLOGY | Facility: HOSPITAL | Age: 76
End: 2021-11-15
Payer: MEDICARE

## 2021-11-15 ENCOUNTER — PATIENT MESSAGE (OUTPATIENT)
Dept: ELECTROPHYSIOLOGY | Facility: CLINIC | Age: 76
End: 2021-11-15
Payer: MEDICARE

## 2021-11-15 ENCOUNTER — HOSPITAL ENCOUNTER (EMERGENCY)
Facility: HOSPITAL | Age: 76
Discharge: HOME | End: 2021-11-15
Attending: EMERGENCY MEDICINE
Payer: MEDICARE

## 2021-11-15 ENCOUNTER — TELEPHONE (OUTPATIENT)
Dept: ELECTROPHYSIOLOGY | Facility: CLINIC | Age: 76
End: 2021-11-15
Payer: MEDICARE

## 2021-11-15 VITALS
WEIGHT: 110 LBS | HEART RATE: 65 BPM | SYSTOLIC BLOOD PRESSURE: 121 MMHG | HEIGHT: 67 IN | DIASTOLIC BLOOD PRESSURE: 63 MMHG | TEMPERATURE: 98 F | RESPIRATION RATE: 20 BRPM | OXYGEN SATURATION: 100 % | BODY MASS INDEX: 17.27 KG/M2

## 2021-11-15 DIAGNOSIS — S00.83XA CONTUSION OF FACE, INITIAL ENCOUNTER: Primary | ICD-10-CM

## 2021-11-15 DIAGNOSIS — R55 SYNCOPE: ICD-10-CM

## 2021-11-15 DIAGNOSIS — S61.411A SKIN TEAR OF RIGHT HAND WITHOUT COMPLICATION, INITIAL ENCOUNTER: ICD-10-CM

## 2021-11-15 LAB
ALBUMIN SERPL BCP-MCNC: 3.3 G/DL (ref 3.5–5.2)
ALP SERPL-CCNC: 81 U/L (ref 55–135)
ALT SERPL W/O P-5'-P-CCNC: 37 U/L (ref 10–44)
ANION GAP SERPL CALC-SCNC: 9 MMOL/L (ref 8–16)
AST SERPL-CCNC: 48 U/L (ref 10–40)
BASOPHILS # BLD AUTO: 0.05 K/UL (ref 0–0.2)
BASOPHILS NFR BLD: 0.6 % (ref 0–1.9)
BILIRUB SERPL-MCNC: 1.5 MG/DL (ref 0.1–1)
BUN SERPL-MCNC: 19 MG/DL (ref 8–23)
CALCIUM SERPL-MCNC: 9.6 MG/DL (ref 8.7–10.5)
CHLORIDE SERPL-SCNC: 109 MMOL/L (ref 95–110)
CO2 SERPL-SCNC: 23 MMOL/L (ref 23–29)
CREAT SERPL-MCNC: 0.8 MG/DL (ref 0.5–1.4)
DIFFERENTIAL METHOD: ABNORMAL
EOSINOPHIL # BLD AUTO: 0.2 K/UL (ref 0–0.5)
EOSINOPHIL NFR BLD: 2.8 % (ref 0–8)
ERYTHROCYTE [DISTWIDTH] IN BLOOD BY AUTOMATED COUNT: 14.2 % (ref 11.5–14.5)
EST. GFR  (AFRICAN AMERICAN): >60 ML/MIN/1.73 M^2
EST. GFR  (NON AFRICAN AMERICAN): >60 ML/MIN/1.73 M^2
GLUCOSE SERPL-MCNC: 151 MG/DL (ref 70–110)
HCT VFR BLD AUTO: 40.2 % (ref 37–48.5)
HGB BLD-MCNC: 12.8 G/DL (ref 12–16)
IMM GRANULOCYTES # BLD AUTO: 0.03 K/UL (ref 0–0.04)
IMM GRANULOCYTES NFR BLD AUTO: 0.4 % (ref 0–0.5)
LYMPHOCYTES # BLD AUTO: 0.7 K/UL (ref 1–4.8)
LYMPHOCYTES NFR BLD: 9 % (ref 18–48)
MCH RBC QN AUTO: 31.6 PG (ref 27–31)
MCHC RBC AUTO-ENTMCNC: 31.8 G/DL (ref 32–36)
MCV RBC AUTO: 99 FL (ref 82–98)
MONOCYTES # BLD AUTO: 0.8 K/UL (ref 0.3–1)
MONOCYTES NFR BLD: 9.7 % (ref 4–15)
NEUTROPHILS # BLD AUTO: 6 K/UL (ref 1.8–7.7)
NEUTROPHILS NFR BLD: 77.5 % (ref 38–73)
NRBC BLD-RTO: 0 /100 WBC
PLATELET # BLD AUTO: 195 K/UL (ref 150–450)
PMV BLD AUTO: 9.7 FL (ref 9.2–12.9)
POTASSIUM SERPL-SCNC: 4.5 MMOL/L (ref 3.5–5.1)
PROT SERPL-MCNC: 6.2 G/DL (ref 6–8.4)
RBC # BLD AUTO: 4.05 M/UL (ref 4–5.4)
SODIUM SERPL-SCNC: 141 MMOL/L (ref 136–145)
WBC # BLD AUTO: 7.8 K/UL (ref 3.9–12.7)

## 2021-11-15 PROCEDURE — 93010 ELECTROCARDIOGRAM REPORT: CPT | Mod: ,,, | Performed by: INTERNAL MEDICINE

## 2021-11-15 PROCEDURE — 93005 ELECTROCARDIOGRAM TRACING: CPT

## 2021-11-15 PROCEDURE — 36415 COLL VENOUS BLD VENIPUNCTURE: CPT | Performed by: EMERGENCY MEDICINE

## 2021-11-15 PROCEDURE — 93010 EKG 12-LEAD: ICD-10-PCS | Mod: ,,, | Performed by: INTERNAL MEDICINE

## 2021-11-15 PROCEDURE — 80053 COMPREHEN METABOLIC PANEL: CPT | Performed by: EMERGENCY MEDICINE

## 2021-11-15 PROCEDURE — 99285 EMERGENCY DEPT VISIT HI MDM: CPT | Mod: 25

## 2021-11-15 PROCEDURE — 85025 COMPLETE CBC W/AUTO DIFF WBC: CPT | Performed by: EMERGENCY MEDICINE

## 2021-11-24 ENCOUNTER — PES CALL (OUTPATIENT)
Dept: ADMINISTRATIVE | Facility: CLINIC | Age: 76
End: 2021-11-24
Payer: MEDICARE

## 2021-11-30 ENCOUNTER — EXTERNAL CHRONIC CARE MANAGEMENT (OUTPATIENT)
Dept: PRIMARY CARE CLINIC | Facility: CLINIC | Age: 76
End: 2021-11-30
Payer: MEDICARE

## 2021-11-30 PROCEDURE — 99490 CHRNC CARE MGMT STAFF 1ST 20: CPT | Mod: S$GLB,,, | Performed by: FAMILY MEDICINE

## 2021-11-30 PROCEDURE — 99490 PR CHRONIC CARE MGMT, 1ST 20 MIN: ICD-10-PCS | Mod: S$GLB,,, | Performed by: FAMILY MEDICINE

## 2021-12-02 ENCOUNTER — TELEPHONE (OUTPATIENT)
Dept: CARDIOLOGY | Facility: HOSPITAL | Age: 76
End: 2021-12-02
Payer: MEDICARE

## 2021-12-07 RX ORDER — NAPROXEN SODIUM 220 MG/1
TABLET, FILM COATED ORAL
Qty: 90 TABLET | Refills: 4 | Status: SHIPPED | OUTPATIENT
Start: 2021-12-07 | End: 2023-11-30

## 2021-12-27 ENCOUNTER — IMMUNIZATION (OUTPATIENT)
Dept: PRIMARY CARE CLINIC | Facility: CLINIC | Age: 76
End: 2021-12-27
Payer: MEDICARE

## 2021-12-27 DIAGNOSIS — Z23 NEED FOR VACCINATION: Primary | ICD-10-CM

## 2021-12-27 PROCEDURE — 91306 COVID-19, MRNA, LNP-S, PF, 100 MCG/0.25 ML DOSE VACCINE (MODERNA BOOSTER): CPT | Mod: S$GLB,,, | Performed by: FAMILY MEDICINE

## 2021-12-27 PROCEDURE — 0064A COVID-19, MRNA, LNP-S, PF, 100 MCG/0.25 ML DOSE VACCINE (MODERNA BOOSTER): CPT | Mod: S$GLB,,, | Performed by: FAMILY MEDICINE

## 2021-12-27 PROCEDURE — 91306 COVID-19, MRNA, LNP-S, PF, 100 MCG/0.25 ML DOSE VACCINE (MODERNA BOOSTER): ICD-10-PCS | Mod: S$GLB,,, | Performed by: FAMILY MEDICINE

## 2021-12-27 PROCEDURE — 0064A COVID-19, MRNA, LNP-S, PF, 100 MCG/0.25 ML DOSE VACCINE (MODERNA BOOSTER): ICD-10-PCS | Mod: S$GLB,,, | Performed by: FAMILY MEDICINE

## 2021-12-31 ENCOUNTER — EXTERNAL CHRONIC CARE MANAGEMENT (OUTPATIENT)
Dept: PRIMARY CARE CLINIC | Facility: CLINIC | Age: 76
End: 2021-12-31
Payer: MEDICARE

## 2021-12-31 PROCEDURE — 99490 CHRNC CARE MGMT STAFF 1ST 20: CPT | Mod: S$PBB,,, | Performed by: FAMILY MEDICINE

## 2021-12-31 PROCEDURE — 99490 PR CHRONIC CARE MGMT, 1ST 20 MIN: ICD-10-PCS | Mod: S$PBB,,, | Performed by: FAMILY MEDICINE

## 2022-01-02 ENCOUNTER — PATIENT MESSAGE (OUTPATIENT)
Dept: CARDIOLOGY | Facility: CLINIC | Age: 77
End: 2022-01-02
Payer: MEDICARE

## 2022-01-07 ENCOUNTER — PATIENT MESSAGE (OUTPATIENT)
Dept: CARDIOLOGY | Facility: CLINIC | Age: 77
End: 2022-01-07
Payer: MEDICARE

## 2022-01-09 ENCOUNTER — PATIENT OUTREACH (OUTPATIENT)
Dept: ADMINISTRATIVE | Facility: OTHER | Age: 77
End: 2022-01-09
Payer: MEDICARE

## 2022-01-10 NOTE — PROGRESS NOTES
Chart was reviewed for overdue Proactive [facility] Encounters (RACHEL)  topics  Updates were requested from care everywhere  Health Maintenance has been updated  [place] immunization registry triggered

## 2022-01-12 ENCOUNTER — OFFICE VISIT (OUTPATIENT)
Dept: CARDIOLOGY | Facility: CLINIC | Age: 77
End: 2022-01-12
Payer: MEDICARE

## 2022-01-12 ENCOUNTER — CLINICAL SUPPORT (OUTPATIENT)
Dept: CARDIOLOGY | Facility: HOSPITAL | Age: 77
End: 2022-01-12
Payer: MEDICARE

## 2022-01-12 VITALS
HEIGHT: 67 IN | HEART RATE: 84 BPM | SYSTOLIC BLOOD PRESSURE: 105 MMHG | BODY MASS INDEX: 16.81 KG/M2 | RESPIRATION RATE: 16 BRPM | DIASTOLIC BLOOD PRESSURE: 68 MMHG | OXYGEN SATURATION: 96 % | WEIGHT: 107.13 LBS

## 2022-01-12 DIAGNOSIS — E86.0 DEHYDRATION, MODERATE: ICD-10-CM

## 2022-01-12 DIAGNOSIS — E88.09 HYPOALBUMINEMIA DUE TO PROTEIN-CALORIE MALNUTRITION: ICD-10-CM

## 2022-01-12 DIAGNOSIS — R79.89 ELEVATED BRAIN NATRIURETIC PEPTIDE (BNP) LEVEL: ICD-10-CM

## 2022-01-12 DIAGNOSIS — Z98.890 S/P ABLATION OF ATRIAL FLUTTER: ICD-10-CM

## 2022-01-12 DIAGNOSIS — E46 HYPOALBUMINEMIA DUE TO PROTEIN-CALORIE MALNUTRITION: ICD-10-CM

## 2022-01-12 DIAGNOSIS — Z95.0 PRESENCE OF CARDIAC PACEMAKER: ICD-10-CM

## 2022-01-12 DIAGNOSIS — R00.1 BRADYCARDIA, DRUG INDUCED: ICD-10-CM

## 2022-01-12 DIAGNOSIS — R74.01 ELEVATED AST (SGOT): ICD-10-CM

## 2022-01-12 DIAGNOSIS — Z86.79 S/P ABLATION OF ATRIAL FLUTTER: ICD-10-CM

## 2022-01-12 DIAGNOSIS — I48.91 UNSPECIFIED ATRIAL FIBRILLATION: ICD-10-CM

## 2022-01-12 DIAGNOSIS — R41.89 COGNITIVE IMPAIRMENT: ICD-10-CM

## 2022-01-12 DIAGNOSIS — I50.32 CHRONIC DIASTOLIC HEART FAILURE: ICD-10-CM

## 2022-01-12 DIAGNOSIS — R54 FRAIL ELDERLY: ICD-10-CM

## 2022-01-12 DIAGNOSIS — I48.0 PAROXYSMAL ATRIAL FIBRILLATION: Primary | ICD-10-CM

## 2022-01-12 DIAGNOSIS — I11.0 HYPERTENSIVE LEFT VENTRICULAR HYPERTROPHY WITH HEART FAILURE: ICD-10-CM

## 2022-01-12 DIAGNOSIS — T50.905A BRADYCARDIA, DRUG INDUCED: ICD-10-CM

## 2022-01-12 DIAGNOSIS — Z95.0 CARDIAC PACEMAKER IN SITU: ICD-10-CM

## 2022-01-12 PROCEDURE — 93294 CARDIAC DEVICE CHECK - REMOTE: ICD-10-PCS | Mod: ,,, | Performed by: INTERNAL MEDICINE

## 2022-01-12 PROCEDURE — 93296 REM INTERROG EVL PM/IDS: CPT | Performed by: INTERNAL MEDICINE

## 2022-01-12 PROCEDURE — 93294 REM INTERROG EVL PM/LDLS PM: CPT | Mod: ,,, | Performed by: INTERNAL MEDICINE

## 2022-01-12 PROCEDURE — 99215 OFFICE O/P EST HI 40 MIN: CPT | Mod: 25,S$GLB,, | Performed by: INTERNAL MEDICINE

## 2022-01-12 PROCEDURE — 93010 EKG 12-LEAD: ICD-10-PCS | Mod: S$GLB,,, | Performed by: INTERNAL MEDICINE

## 2022-01-12 PROCEDURE — 99215 PR OFFICE/OUTPT VISIT, EST, LEVL V, 40-54 MIN: ICD-10-PCS | Mod: 25,S$GLB,, | Performed by: INTERNAL MEDICINE

## 2022-01-12 PROCEDURE — 99999 PR PBB SHADOW E&M-EST. PATIENT-LVL III: ICD-10-PCS | Mod: PBBFAC,,, | Performed by: INTERNAL MEDICINE

## 2022-01-12 PROCEDURE — 93010 ELECTROCARDIOGRAM REPORT: CPT | Mod: S$GLB,,, | Performed by: INTERNAL MEDICINE

## 2022-01-12 PROCEDURE — 99999 PR PBB SHADOW E&M-EST. PATIENT-LVL III: CPT | Mod: PBBFAC,,, | Performed by: INTERNAL MEDICINE

## 2022-01-12 PROCEDURE — 99213 OFFICE O/P EST LOW 20 MIN: CPT | Mod: PBBFAC | Performed by: INTERNAL MEDICINE

## 2022-01-12 NOTE — PROGRESS NOTES
Subjective:    Patient ID:  Ayla Dela Cruz is a 76 y.o. 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 Significant weight loss due to major depression off Limbitrol (stopped 2010), diet controlled T2DM.  PCP: Dr. Olivo, see annually, do labs  EP: Dr. Calderon  Orthopedic: Dr. Aguero  Surgeon: Dr. Gonsalez, and Dr. Dc  Rheumatologist: Dr. Pak  Prior cardiologist: Dr. Gibson, last seen over a year  Pulmonary: Marichuy Mcelroy, AGUILAR  Psychologist: Nu Fermin NP, in [place], see q3 months for past 10 years, adjust psychiatric medications.  Gyn: Dr. Jordan  GI: Dr. Schultz  Neurologist: Dr. Ramirez  Dermatologist: Dennis Corrigan  Pain: Dr. 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  Nicotine: never  Alcohol: none  Illicit drugs: none  Cardiac symptoms: none  Home BP: morning 106 to 110 /76, feeling of weakness, later on will be above 130 and feeling fine.  Medication compliance: yes, stopped statin, uncertain reason.  Diet: regular  Caffeine: no  Labs: 1/2019 LDL 90.6 on Crestor 40 mg, normal TSH, CMP (albumin 3.1), normal CBC, Vit. D  10/2019 AST 95, Hgb 11.4  Lab Results   Component Value Date    TSH 2.314 09/10/2021        Lab Results   Component Value Date    HGBA1C 6.2 (H) 02/06/2021       Lab Results   Component Value Date    WBC 8.37 01/24/2020    HGB 11.7 (L) 01/24/2020    HCT 37.0 01/24/2020    MCV 94 01/24/2020     01/24/2020     Normal H&H in  11/15/2021      CMP  Sodium   Date Value Ref Range Status   11/15/2021 141 136 - 145 mmol/L Final     Potassium   Date Value Ref Range Status   11/15/2021 4.5 3.5 - 5.1 mmol/L Final     Chloride   Date Value Ref Range Status   11/15/2021 109 95 - 110 mmol/L Final     CO2   Date Value Ref Range Status   11/15/2021 23 23 - 29 mmol/L Final     Glucose   Date Value Ref Range Status   11/15/2021 151 (H) 70 - 110 mg/dL Final     BUN   Date Value Ref Range Status   11/15/2021 19 8 - 23 mg/dL Final     Creatinine   Date Value Ref Range Status   11/15/2021 0.8 0.5 - 1.4 mg/dL Final   09/24/2012 0.6 0.2 - 1.4 mg/dl Final     Calcium   Date Value Ref Range Status   11/15/2021 9.6 8.7 - 10.5 mg/dL Final   09/24/2012 9.9 8.6 - 10.2 mg/dl Final     Total Protein   Date Value Ref Range Status   11/15/2021 6.2 6.0 - 8.4 g/dL Final     Albumin   Date Value Ref Range Status   11/15/2021 3.3 (L) 3.5 - 5.2 g/dL Final     Total Bilirubin   Date Value Ref Range Status   11/15/2021 1.5 (H) 0.1 - 1.0 mg/dL Final     Comment:     For infants and newborns, interpretation of results should be based  on gestational age, weight and in agreement with clinical  observations.    Premature Infant recommended reference ranges:  Up to 24 hours.............<8.0 mg/dL  Up to 48 hours............<12.0 mg/dL  3-5 days..................<15.0 mg/dL  6-29 days.................<15.0 mg/dL       Alkaline Phosphatase   Date Value Ref Range Status   11/15/2021 81 55 - 135 U/L Final   09/24/2012 63 23 - 119 UNIT/L Final     AST   Date Value Ref Range Status   11/15/2021 48 (H) 10 - 40 U/L Final   09/24/2012 26 10 - 30 UNIT/L Final     ALT   Date Value Ref Range Status   11/15/2021 37 10 - 44 U/L Final     Anion Gap   Date Value Ref Range Status   11/15/2021 9 8 - 16 mmol/L Final   09/24/2012 12 5 - 15 meq/L Final     eGFR if    Date Value Ref Range Status   11/15/2021 >60 >60 mL/min/1.73 m^2 Final     eGFR if non    Date Value Ref  Range Status   11/15/2021 >60 >60 mL/min/1.73 m^2 Final     Comment:     Calculation used to obtain the estimated glomerular filtration  rate (eGFR) is the CKD-EPI equation.        @labrcntip(troponini)@    BNP   Date Value Ref Range Status   02/04/2021 127 (H) 0 - 99 pg/mL Final     Comment:     Values of less than 100 pg/ml are consistent with non-CHF populations.   }   Lab Results   Component Value Date    CHOL 197 01/13/2021    CHOL 142 01/31/2020    CHOL 188 01/29/2019     Lab Results   Component Value Date    HDL 51 01/13/2021    HDL 42 01/31/2020    HDL 85 (H) 01/29/2019     Lab Results   Component Value Date    LDLCALC 120.4 01/13/2021    LDLCALC 82.4 01/31/2020    LDLCALC 90.6 01/29/2019     Lab Results   Component Value Date    TRIG 128 01/13/2021    TRIG 88 01/31/2020    TRIG 62 01/29/2019     Lab Results   Component Value Date    CHOLHDL 25.9 01/13/2021    CHOLHDL 29.6 01/31/2020    CHOLHDL 45.2 01/29/2019      Last Echo: 2/2021  Last stress test: 07/30/14, Lexiscan  Cardiovascular angiogram: none  ECG: Atrial pacing, rate 65, left axis, pulmonary pattern with low anterior forces, QTc 459 msec  Fundoscopic exam: biannually, no retinopathy, being treated for glaucoma.    In 6/2014:  Hospitalized 6/3/2014 for acute right sided weakness and slurred speech  LEONARDO Dela Cruz is a 69 y.o. female admitted to the services of hospital medicine via the ER for acute CVA. Presented with difficulty speaking, facial drooping and weakness of the right upper and lower extremities. She missed the time window for tPA. ST/PT/OT as well as cardiology and neurology were consulted. Dr. Jurado of cardiology managed A fib. Patient rapidly improved functioning with PT/OT/ST. Currently her goals with PT are met as she is ambulating over 400 feet independently.  She was not approved for IP rehab and refuses SNF. She will be discharged home with outpatient PT/ST/OT evaluation and treatment.    Neurocardio work up  CT head -  Mild involutional changes and findings suggesting mild microvascular ischemic change with no acute intracranial findings    Carotid US - No hemodynamically significant stenosis is noted by velocity criteria involving either internal carotid artery.  A hemodynamically significant stenosis is defined as a stenosis of greater than 50% of the internal carotid artery vessel lumen    ECHO, 6/4/2014, CONCLUSIONS     1 - Concentric hypertrophy. Wall thickness is mildly increased, with the septum and the posterior wall each measuring 1.1 cm across.    2 - Normal left ventricular systolic function (EF 60-65%).     3 - Mild mitral regurgitation.     4 - Left ventricular diastolic dysfunction.     5 - Pulmonary hypertension. estimated PA systolic pressure is 64 mmHg.    6 - Normal right ventricular systolic function .     7 - Suggestion for increase in LV mass from echo on 3/18/2014..     Echo bubble study also negative. Had episode of PAF during monitoring and convert with restart of Flecainide.   Since DC, improving strength in the right hand, right leg feels normal but tire easier. Speech improving. To receive PT/OT/speech today.  Medications reviewed - will DC ASA for now, and know the effects of the Limbitrol and Ativan, uses prn rarely. Some loss of appetite and taste.    Active problems in hospital  Acute left hemispheric CVA, MRI suggest multiple areas, was not on OAC nor antiplatelet Rx  PAF with high CHADS-VAS score, post ablations and DCCs  HTN with LVH  Interstitial lung disease  Pulmonary hypertension  Hyperlipidemia was not on statin    Since visit of 6/20, problem with peripheral swelling, now taking Lasix daily. Last hospitalization / CMP, 6/3 showed K+ 3.4 with normal renal function. Also noted AVILA along with exertional chest heaviness, on-and-off over past several years. Noted it with walking and have to rest. Can last up to an hour. Max grade 10/10, yesterday during the day.  in hospital. Also quite  anxious, stopped Limbitrol due to side effects, managed by Dr. Olivo. Quite sedentary and major decrease in appetite, due to depression. No Psychiatrist. Home BP high 175/97. ECG shows NSR, rate 61, right axis, nonspecific T waves abnormalities, prolong QTc 483 msec. Low anterior forces.    Holter, 6/30/2014:  PREDOMINANT RHYTHM  1. Sinus rhythm with heart rates varying between 43 and 67 bpm with an average of 55 bpm.     VENTRICULAR ARRHYTHMIAS  1. There were frequent polymorphic PVCs totalling 1388 and averaging 40 per hour.  There was 1 couplet.    2. There were no episodes of ventricular tachycardia.    SUPRA VENTRICULAR ARRHYTHMIAS  1. There were very rare PACs totalling 47 and averaging 1 per hour.     2. There were no episodes of sustained supraventricular tachycardia.    SINUS NODE FUNCTION  1. There was no evidence of high grade SA khai block.     AV CONDUCTION  1. There was no evidence of high grade AV block.     DIARY  1. The diary was returned, but not completed    MISCELLANEOUS  1. This was a tape of adequate length (34 hrs).     Since visit of 7/24, better, reviewed by Dr. Olivo and started antidepressant Lexapro. BMP still showed mild hypokalemia of 3.3, not using Lasix at this time. Still feeling fatigue, anxiety, and request refill for Nexium. Discussed need for GI review on chronic PPI. Lack of appetite.  Lexiscan, 7/30/2014, - Nuclear Quantitative Functional Analysis:   LVEF: 66 % (normal is 55 - 69)  LVED Volume: 50 ml (normal is 60 - 98)  LVES Volume: 17 ml (normal is 20 - 42)    Impression: NORMAL MYOCARDIAL PERFUSION  1. The perfusion scan is free of evidence for myocardial ischemia or injury.   2. There is a mild intensity fixed defect in the anteroseptal wall of the left ventricle, secondary to breast attenuation.   3. Resting wall motion is physiologic.   4. Resting LV function is normal.  (normal is 55 - 69)  5. The ventricular volumes are normal at rest and stress.   6. The  "extracardiac distribution of radioactivity is normal.     No problem with spironolactone, BP improved, home BP similar to office readings.    Since visit of 8/14, had some distress and home monitor showed HR of 40, felt "bad" and nervous to ED, note reviewed, ECG and final pulse count 57-58. Labs reviewed - normal renal function and K+ 3.8. Still taking one tab of K+ daily. Not using Lasix. Explained HR discrepancy may be due to VPCs. Reviewed by Ms. Fermin and Lexapro increased to 20 mg, 2 days ago. Feeling "a lot better". Sleeping better. Still sedentary but ready to be more active. Eating better have lost additional 5 lbs since 8/2014.    Since visit of 9/5, still with speech therapy, noted progressive near syncope with SOB and irregular heart beats. Also noted some ankle swelling over the past 2 weeks. ECG shows marked bradycardia, rate 48, right axis, pulmonary pattern, 1st degree AVB. Wellbutrin 100 mg daily along with Lexapro 20 mg by Ms. Fermin NP. BMP showed K+3.5. To use Lasix PRN    Since visit of 9/25, still with marked AVILA, only able to walk about 30' before having to stop. Bothered by increase palpitations during tapering of BB. No definite lung problem, evaluated this year. ECG shows sinus dewayne, rate 53, VPC, RBBB with suggestion of septal MI. No evidence for anemia - CBC 9/3/2014 was normal. Just started doing some activities with arm and leg exercise for the last 2 weeks, Doing some OT alternating with PT every other day.  CXR, 6/3/2014 - Lungs are clear of infiltrate and costophrenic sulci are sharp.  The cardiomediastinal silhouette appears stable.    PET scan, 4/2014 - 1.A hypermetabolic left pulmonary mass is noted with an SUV of 3.0 maximally.  A neoplastic, infectious, or granulomatous process is on the differential. Clinical correlation is suggested.  Close follow-up for resolution or biopsy would be suggested    2.  Elevated right-sided heart pressures are suspected with a large right " "atrium    3.  Right-sided pleural effusion    4.  Hypermetabolic thyroid gland asymmetrically on the right.  This may relate to thyroiditis, Graves' disease, or neoplastic process.  Ultrasound may be helpful.    5.  Small hypermetabolic focus in the expected location of the left ovary, a female pelvis ultrasound may be helpful for further evaluation.  This could relate to a uterine fibroid that has necrosed or infarcted    Biopsy of lung nodule on 5/1/2014 - negative for CA    CTA, 4/2014 - No evidence of pulmonary thromboembolism.    2.  Enlarged cardiac silhouette and secondary findings of elevated right heart pressures with diffuse mosaic lung pattern and right basilar pleural fluid suggesting cardiac decompensation/heart failure.    3. Unchanged 1 cm nodular density within the left upper lobe which previously demonstrated hypermetabolism by PET/CT and unchanged mediastinal lymphadenopathy.    4.  Subpleural nodular density within the right upper lobe is unchanged when compared with the previous study and could represent an area of remote fibrotic scarring.    5.  Heterogeneous appearance of the spleen, possibly due to the phase of contrast enhancement.  Evolving splenic infarction is not entirely excluded.    6. Suggestion of colonic wall thickening involving the descending colon.  Please correlate for symptoms of colitis.    Since visit of 10/14, rehospitalized for HF on 10/26 to 10/29/2014.  DCS - "Ayla Dela Cruz is a 69 y.o. female admitted to the services of hospital medicine via the ER for acute diastolic heart failure. C/o severe SOB and increase in leg swelling over the past two days. Under the care of Dr. Jurado. Recently was taken off metoprolol. History of paroxsymal atrial fib. Hospitalized here in June in this year for acute CVA. It was at that time, pt started Xarelto. Deficits has resolved. No history of heart failure.     Hospital Course: The patient was admitted with acute CHF biventricular and " "mild elevation of her troponin's at 0.029 - 0.035 and therefore an anginal equivalent was suspected. The patient also had significant hypertension upon admission and although she was not in atrial fibrillation, her EKG showed a significant first degree AV block and RBBB. Discussion was made as to whether or not to decrease the patient flecainide; however due to the risk of her going back into atrial fibrillation this was not done. Upon discharge the patient's lisinopril was increased to 10 mg and aldactone was added."    RHC done HEMODYNAMIC RESULTS:    LVEDP (Pre): 24 mmHg  BP: 166/104    Air rest:  PA: 90/34 (54)  PW:  (24)  RVEDP: 16  RA:  (16)    C. SUMMARY:    1. Diastolic dysfunction.  2. - Kee CO 2.64 L/min  3. - Mean systemic pressure 108.6 mmHG, stage II HTN  4. - SVR 41.16 Wood Units  5. - PVR 11.36 Wood Units  6. - Pulmonary HTN due to left sided heart disease and stage II HTN    D. RECOMMENDATIONS:    1. Routine post-cath care.  2. Cardiac rehab referral.  3. Follow up with Dr. Barrett Jurado.  4. - Aggressive BP lowering and diuresis    Repeat ECHO 11/5/2014 CONCLUSIONS     1 - Concentric remodeling. Septal wall thickness is increased, and posterior wall thickness is mildly increased, with the septum measuring 1.3 cm and the posterior wall measuring 1.1 cm across.    2 - Mildly to moderately depressed left ventricular systolic function (EF 40-45%).     3 - Left ventricular diastolic dysfunction. E/e'(lat) is 16    4 - Right ventricular enlargement with mildly depressed systolic function.     5 - Pulmonary hypertension. estimated PA systolic pressure is 56 mmHg.     6 - Intermediate central venous pressure.     7 - No evidence of intracardiac shunt.     8 - No significant change from Echo of 6/4/2014.    Active problems:  Progressive severe SOB, functional class IV  Diastolic dysfunction, elevated BNP and Troponin  Hypokalemia due to diuretics  Secondary Pulmonary HTN with peripheral edema  HTN  History of " "CVA 6/3/2014  PAF controlled with Flecanide  Marked bradycardia with BB  On OAC  Hematuria    At home receiving PT, OT and speech Rx twice a week, with  nurse once a week, no problem with medications. Feeling better, sleeping well. Home /73.    Since visit of 11/14, feeling "much better", concern of occasional HTN, home BP /66-99, HR . Lot more active, no problem. Not using walker, no CP, SOB, nor dizziness. Recent BMP - normal renal function and K+ 4.1.    Since visit of 12/19/2014, worry about HTN home readings similar to office up to . Morning reading is higher. No HA nor visual abnormalities. Xanax has helped but afraid to use too much. ECG shows sinus arrhythmia, rate of 65, late transition and LAFB. Also bothered with dry cough with the Lisinopril. And started to have return of arm pains that was attributed to atorvastatin, on Crestor. Discussed options.     Since visit of 1/16/2015, noted frequent nocturia, 8-10 times, taking losartan-HCT at night time. Have stopped using Lasix and spironolactone for past 2 months. More active without much problem. Labs normal renal function, K+ 4.2, BNP now 37, A1C 5.6%.    Since visit of 2/18/2015, improving, no further dizziness, some SOB when "stressed", usually helped with alprazolam, use only prn, about 3-6 times weekly. Compliant with medications. Been off Crestor for 6 weeks with concern of muscle problem. Home BP is fine, similar to office.     Since visit of 4/15, appetite returned, feeling a lot better. Active, walking twice a week for about half an hour. Also do calisthenic about twice a week, no problem. Seeing Dr. Zavaleta for generalized pruritis for past 3 months. Cardiac wise less AVILA. Sleeping well. Labs in 5/2015, normal CBC without eosinophilia, thyroid normal, ferritin level low normal at 21. Off Crestor for couple months due to fear of muscle pain but did not find much difference being off medication. Last Lipid in 11/2014 was " "good, on daily dose of Crestor. Home /79.    Since visit of 7/2/2015, feeling "great", very active without problem, no more AVILA nor dizziness with standing. Compliant with medications, don't miss. Using Tylenol 500 mg BID for arthritis. Notice easy bruising on Xarelto. Home /10.  Holter - PREDOMINANT RHYTHM  1. Sinus arrhythmia with heart rates varying between 46 and 110 bpm with an average of 73 bpm.     VENTRICULAR ARRHYTHMIAS  1. There were very rare PVCs recorded totalling 8 and averaging less than 1 per hour.     2. There were no episodes of ventricular tachycardia.    SUPRA VENTRICULAR ARRHYTHMIAS  1. There were very frequent PACs totalling 3054 and averaging 132 per hour.  There were 29 bigeminal cycles.  There were 103 couplets.    2. 3% of complexes.    3. There were no episodes of sustained supraventricular tachycardia.    SINUS NODE FUNCTION  1. There was no evidence of high grade SA khai block.     AV CONDUCTION  1. There was no evidence of high grade AV block.     2. The longest RR interval was 1565 msec.     DIARY  1. The diary was properly completed.     2. There was 1 episode of skipping beats reported. The corresponding rhythm strips revealed the following:             During event 1 the rhythm was sinus rhythm at 75 bpm.     3. There was 1 episode of skipped beat reported. The corresponding rhythm strips revealed the following:             During event 1 the rhythm was sinus arrhythmia at 63 bpm.     MISCELLANEOUS  1. This was a tape of adequate length (23 hrs).     Since visit of 10/15, place on new antidepressant, Venlafaxine 75 mg daily, tried 2 and developed rapid HR to 125 bpm, feels more anxious, now switched to Citalopram. Also noted the daily dose of Lorazepam does not seem adequate. Orthostatic VS is positive with lying 142/73, , standing 120/62, . Been taking spironolactone 25 mg for last 3 days. Does feel thirsty. Labs reviewed A1C 5.8%, CBC, BMP were WNL. Lipid " "shows LDL 99, non-. Goal preferred is <70 and < 100. No problem with 10 mg of Crestor. Had vacation at [place] and [place], walked all over without problem.    Since visit of 1/18/2016, no new problem. 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 Labs with , non-, hsCRP at 2.56.     In 10/2016, had acute GB attack with rupture in 8/2016, then tried on Wellbutrin took only 1-2 tabs and BP up to 201/100 with head tightness. Subsequently back to normal, the last 2.5 days took Losartan bid. Discussed Rx options for HTN. Advised to be more active, regular exercise. Lab reviewed LDL in 8/2016 93.     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 Still with daily palpitations, last up to half hours. Have electronic watch, David Barroso, since 9/2016, suppose harmonize patient with the universe. Feeling better if on during the day. Lot of walking at school, no problem.   Holter in 10/2016 - PREDOMINANT RHYTHM  1. Sinus rhythm with heart rates varying between 52 and 144 bpm with an average of 75 bpm.     2. Paroxysmal atrial fibrillation    VENTRICULAR ARRHYTHMIAS  1. There were occasional mostly monomorphic PVCs totalling 596 and averaging 13 per hour.     2. There were no episodes of ventricular tachycardia.    SUPRA VENTRICULAR ARRHYTHMIAS  1. There were frequent PACs totalling 2982 and averaging 69 per hour.  There were 69 bigeminal cycles.  There were 55 couplets.    2. There were no episodes of sustained supraventricular tachycardia.    3. Paroxysmal atrial fibrillation was noted in the the study.     SINUS NODE FUNCTION  1. There was no evidence of high grade SA khai block.     AV CONDUCTION  1. There was no evidence of high grade AV khai filtering.     2. The longest RR interval was 1725 msec.     DIARY  1. The diary was returned, but not completed    MISCELLANEOUS  1. This was a tape of adequate length (43 hrs).     ECHO CONCLUSIONS     1 - Hyperdynamic left ventricular " "systolic function (EF 65-70%).     2 - Normal left ventricular diastolic function.     3 - Normal right ventricular systolic function .     4 - Pulmonary hypertension. The estimated PA systolic pressure is 64 mmHg.     5 - Less evidence for LVH from Echo in 11/2014.     In 5/2017, bothered by recurrent PAF with AVILA after 10 feet walking. Felt better before on Flecainide 100 mg bid, but Holter continued to show PAF. Attempt up titration, problem with itching, switched to propafenone 150 mg tid, continued with itching and reviewed by allergist, treated with 10 days of cortisone with benefit. No hive and no itching, just don't feel good due to the irregular rhythm. History reviewed, had 2 prior AF ablation, last in [place], [place] in around 2000, recall being told not to do again. Recall seeing an [facility] EP doc but didn't like him. Discussed various options. Will stop antiarrhythmic for now, will be going on a 16 days trip to [place]. Reviewed prior medications, have taken Tenormin, metoprolol tartrate, and short-acting diltiazem in the past without problem. Refer to Dr. Calderon for review. ECG in AF, rate 99, PRWP, LAFB    In 11/2017, post AF ablation, felt good for a few weeks, noted some SOB and had review with Dr. Calderon on 11/1 - 72 year old female with a longstanding history of atrial arrhythmias. Her [id number] Score is 5 (age, female, TIA, HTN) so she should remain on anticoagulation indefinitely. She has failed Flecainide. She is now 6 weeks post-PVI and MA flutter line, and maintaining sinus rhythm on low-dose Amiodarone. Given her intermittent AVILA which started post-ablation, I have stopped Amiodarone which I think is the likely culprit. I instructed her to continue taking Lasix PRN, as well as metoprolol and Xarelto." ECG on 11/1 was in NSR, rate 61, PRWP.  Recommended to stop amiodarone but then started to feel the palpitation daily, felt nervous and at the same time being taper down on the nightly Ativan. " "Did have some breakthrough anxiety attacks. Also had strained the upper back and right arm / shoulder, requesting some Tramadol, tried Jan's Tramadol with good relief. Understand this is an opoid. Used Excedrin with caffeine and bothered by more palpitations.    In 3/2018, here for recurrent palpitations, no inciting factors over the past 6 weeks. Had review with Dr. Calderon in 2/2018 - "72 year old female with a longstanding history of atrial arrhythmias and prior ablations at outside institutions. Her [id number] Score is 5 (age, female, TIA, HTN) so she should remain on anticoagulation indefinitely. She is now 5 months post-PVI and MA flutter line, and maintaining sinus rhythm off Amiodarone. The plan is to continue Xarelto, and to see me again in 6 months." Palpitations appears associated with AVILA, had difficulties walking in house or up a ramp. Started 100 mg of amiodarone last week, daily, feels some benefit. ECG today in Sinus rhythm vs wandering atrial pacemaker with frequent PJC, rate 59. Also taking Losartan in the morning appears more effective.  Holter 11/2017 - PREDOMINANT RHYTHM  1. Sinus arrhythmia with heart rates varying between 39 and 102 bpm with an average of 58 bpm.     VENTRICULAR ARRHYTHMIAS  1. There were occasional PVCs totalling 728 and averaging 15 per hour.  There were 5 bigeminal cycles.     2. There were no episodes of ventricular tachycardia.    SUPRA VENTRICULAR ARRHYTHMIAS  1. There was no supraventricular ectopic activity.    SINUS NODE FUNCTION  1. There was no evidence of high grade SA khai block.     AV CONDUCTION  1. There was no evidence of high grade AV block.     2. The longest RR interval was 1820 msec.     DIARY  1. The diary was not returned    MISCELLANEOUS  1. There were occasional hookup related artifacts. Overall, the study was of good quality.     2. This was a tape of adequate length (48 hrs).     in 5/2018, no new problem, still concern of AVILA, usually with " "walking, variable as little 10 feet or fine with some long walk, can play flute for an hour but find difficulties in holding a note. Off daily amiodarone, uses it prn for palpitation, find helpful. Labs reviewed from 1/2018, TSH, Vitamin D, LDL 68.2, A1C 5.9%, CMP - normal renal function, K+ 3.6. To go to [place] for a month in 8/2018.    In 9/2018, return from a month family reunion trip to [place]. Problem with hypotension with Tramadol and Losartan. Had review with Dr. Calderon on 9/5 "Ayla Dela Cruz is a 73 year old female with a longstanding history of atrial arrhythmias and prior ablations at outside institutions. Her [id number] Score is 5 (age, female, TIA, HTN) so she should remain on anticoagulation indefinitely. She is now 11 months post-PVI and MA flutter line, and maintaining sinus rhythm off Amiodarone. However, she is feeling poorly, with frequent PACs and borderline hypotension. The plan is to stop Losartan, echo in next several weeks, Holter monitor in 6 weeks, and follow-up with me in 8 weeks."  Off both medication on 8/31, no problem now. No heart worry. Denies any CP nor SOB. Still teaching. ECG on 9/5 shows NSR with sinus arrhythmia.    In 3/2019, return for follow up, had syncopal spell with hypotension at [employer] required reconstruction of the right ankle, now in PT, doing well. No heart complication, no AF. LDL 90.6 in 1/2019. 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 No heart worries, no CP nor SOB, much better, breathing improved with daily Breo use. Discussed option with NOAC.    In 9/2019, 6 months review, concern of bruising with Xarelto, recall problem with dark stool with Eliquis. Discussed option.     In 1/2020 return due to allergic reaction to Pradaxa, had to stop Eliquis for similar problems - hives and rash, difficult to sleep. Also problem with embolism when off NOAC for 7 days. Discuss alternatives.    In 3/2020, same problem with Savaysa, noted about an " "hour of PAF this morning, felt nervous. Troubled by recent viral infection and also pain injection with steroid, which helped the rash temporary. Would like to proceed with mechanical alternative. History include past use of Coumadin for about 2 years, had some difficult with GIB and also required up to 3 times a week testing.    In 8/2020, here for 6-months review. Troubled more with JOSE with quarantine. Working with therapist. Keeping busy with working on a new book, music also. No heart worries.    In 8/2021, return for annual review. Problem with requiring medication changes for major depression with anorexia and malnutrition. Weight loss of 20 lbs and now improving. No cardiac issue except for slow heart rate down to 52 bpm, asymptomatic.    Fco Calderon MD noted "75 year old female with a longstanding history of atrial arrhythmias and prior ablations at outside institutions. Her [id number]-[id number] Score is 5 (age, female, TIA, HTN) so she should remain on anticoagulation indefinitely. She is now >3 years post-PVI and MA flutter line, and is maintaining sinus rhythm off Amiodarone.      Ms. Dela Cruz had a likely cardioembolic event after stopping AC for a week. Ms. Dela Cruz wants to be on an anticoagulant she can crush. I have switched her back to eliquis 5 mg bid. She thinks her pruritis was from [employer] laundry detergent and not eliquis.     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 I have told her to contact Dr. Pedraza if she decides she can no longer take anticoagulants so she can discuss details of procedure with him further."    HPI comments: in 1/2022, return for follow up. Post PPM in 10/2021, one episode of near syncope with head injury, no clear etiology, no problem over the past 2 " months. Noted some morning dizziness with  to 110 and HR in the 80s.. Not as active as before and also noted some cognitive dysfunction with loss of ID. No cardiac complaints. Medication reviewed, on low-dose BB for PAF rate control.    RO     Constitutional: negative for chills, fatigue, fevers, sweats, no further slurred speech, and no dysarthria, some lost of appetite with JOSE, intolerance to heat, bruises easily on NOAC and steroid, weight down another 5 lbs since 8/2021.   Eyes: negative for icterus, redness and visual disturbance, have visual halo, no more bleed in the right Iris  Respiratory: negative for asthma, cough have resolved off ACE-I, have dyspnea on exertion, occasional SOB with HR 85 to 100 bpm, have lifelong snoring, [place] score 6 improved down to 4 up to a 6 , no hemoptysis, pleurisy/chest pain and wheezing  Cardiovascular: negative for chest pain, chest pressure/discomfort, no further orthostatic dizziness, and no palpitations but have irregular heart beats, no paroxysmal nocturnal dyspnea and syncope  Gastrointestinal: negative for abdominal pain, nausea and vomiting, have GERD  Musculoskeletal:positive for arthralgias, and less right sided muscle weakness with improvement in leg strength, improved bilateral ankle pains - OA and no myalgias  Neurological: negative for coordination problems, gait problems, headaches, paresthesia, have some tremors but able to draw, loss of voice at times, and no vertigo  Psych: positive for depression, nervousness, anxiety, less stressed due to 's have improved  Skin - no further pruritic rash, have dryness    Objective:    Physical Exam    General appearance: alert, appears stated age, cooperative and no distress, decrease skin turgor, RA O2 sat. 96%, sleepy during examination.  Head: Normocephalic, without obvious abnormality, atraumatic  Eyes:  conjunctivae/corneas clear. PERRL, EOM's intact.    Neck: no adenopathy, no carotid bruit, no JVD,  "supple, symmetrical, trachea midline and thyroid not enlarged, symmetric, no tenderness/mass/nodules  Lungs:  clear to auscultation bilaterally  Chest wall: no tenderness  Heart: irregular rate and rhythm, normal S1, S2 normal, click, rub or gallop    Abdomen: soft, non-tender; bowel sounds normal; no masses,  no organomegaly, waist 31" down to 23" hip 42"  Extremities: extremities no further weakness of the right upper extremity, atraumatic, no cyanosis and have no edema, minor varicose veins  Pulses: 2+ and symmetric    Assessment:       1. Paroxysmal atrial fibrillation, ablations X 3 1995, 1997, 9/2017, CHADS-VAS score 5, HAS-BLED score 5     2. S/P ablation of atrial flutter    3. Hypertensive left ventricular hypertrophy with heart failure    4. Bradycardia, drug induced    5. Chronic diastolic heart failure, 2014    6. Cardiac pacemaker in situ, [employer], dual chamber, 10/14/2021    7. Cognitive impairment    8. Hypoalbuminemia due to protein-calorie malnutrition    9. Elevated AST (SGOT)    10. Elevated brain natriuretic peptide (BNP) level, range 98 - 1045    11. Frail elderly    12. Dehydration, moderate         Plan:       Ayla was seen today for follow-up.    Diagnoses and associated orders for this visit:    Paroxysmal atrial fibrillation, ablations X 3 1995, 1997, 9/2017, CHADS-VAS score 5, HAS-BLED score 5   -     IN OFFICE EKG 12-LEAD (to Muse)    S/P ablation of atrial flutter    Hypertensive left ventricular hypertrophy with heart failure    Bradycardia, drug induced    Chronic diastolic heart failure, 2014    Cardiac pacemaker in situ, [employer], dual chamber, 10/14/2021    Cognitive impairment    Hypoalbuminemia due to protein-calorie malnutrition    Elevated AST (SGOT)    Elevated brain natriuretic peptide (BNP) level, range 98 - 1045    Frail elderly    Dehydration, moderate    Other orders  -     apixaban (ELIQUIS) 5 mg Tab; Take 1 tablet (5 mg total) by mouth 2 (two) times " daily.  Dispense: 60 tablet; Refill: 11    - All medical issues reviewed, continue current Rx but will taper off BB. Half dose for 20 days then stop metoprolol.  - Warning signs of MI and stroke given, if symptoms last more than 5 minutes, stop immediately and call 911, then chew 2-4 low-dose ASA (81 mg).  - Need to remain well hydrated but avoid drinking within 4 hours of bedtime. Recommend at least 90 oz of fluid daily per IOM ([facility]stitute of Medicine) suggestion.  - CV status stable, off antiarrhythmic, all medications reviewed, patient acknowledge good understanding.  - Recommend healthy living: healthy diet and regular exercise aiming for fitness, restorative sleep and weight control  - Recommend using Tylenol as first line pain medications.  - Instruction for Mediterranean diet and heart healthy exercise given.  - Need good exercise program, 4 key elements: 1. Aerobic (walking, swimming, dancing, jogging, biking, etc, 2. Muscle strengthening / resistance exercise, need to do 2-3 times weekly, 3. Stretching daily, good stretch takes a whole  total minute. 4. Balance exercise daily.  - Highly recommend 30-60 minutes of exercise daily, can have Sunday off, with 2-3 sessions of muscle strengthening weekly. A  would be very helpful.  - Recommend at least biannual cardiovascular evaluation in view of her significant risk factors, patient 's preference    Patient Active Problem List   Diagnosis    Paroxysmal atrial fibrillation, ablations X 3 1995, 1997, 9/2017, CHADS-VAS score 5, HAS-BLED score 5     Hypertension, 1985    Hyperlipidemia, baseline     Glaucoma (increased eye pressure)    Fibroid uterus    Thickened endometrium    Abnormal CT scan, chest    Interstitial lung disease    H/O: CVA (cerebrovascular accident), June 2014    LVH (left ventricular hypertrophy) due to hypertensive disease    Depression    OA (osteoarthritis)    Bradycardia, drug induced    Chronic diastolic  heart failure, 2014    Elevated brain natriuretic peptide (BNP) level, range 98 - 1045    Microalbuminuria    Hypoalbuminemia due to protein-calorie malnutrition    TIA (transient ischemic attack), 11/3/2014    S/P ablation of atrial flutter    JOSE (generalized anxiety disorder)    Other spondylosis, lumbar region    Cervical spondylosis    Medication intolerance    Mild persistent asthma without complication    Anticoagulant long-term use    GERD (gastroesophageal reflux disease)    Renal infarct, due to embolism off Eliquis for 7 days    Mild intermittent asthma without complication    Elevated troponin    Body mass index (BMI) 19.9 or less, adult    Localized hives    Elevated AST (SGOT)    Iron deficiency anemia due to chronic blood loss    Other specified spondylopathies, lumbar region    Recurrent major depressive disorder, in partial remission    Gastroenteritis    Other spondylosis, cervical region    History of DVT (deep vein thrombosis)    Chronic sinus complaints    Second degree AV block, Mobitz type I    Anemia    Syncope    Protein calorie malnutrition    At risk for cardiovascular event    Atrial fibrillation    Long term (current) use of anticoagulants    Atrial arrhythmia    Cardiac pacemaker in situ, [employer], dual chamber, 10/14/2021    Cognitive impairment    Frail elderly    Dehydration, moderate     Greater than 50% was spent in counseling and coordination of care. The above assessment and plan have been discussed at length. Labs and procedure over the last 6 months reviewed. Problem List updated. Asked to bring in all active medications / pills bottles with next visit.

## 2022-01-25 ENCOUNTER — TELEPHONE (OUTPATIENT)
Dept: FAMILY MEDICINE | Facility: CLINIC | Age: 77
End: 2022-01-25
Payer: MEDICARE

## 2022-01-25 NOTE — TELEPHONE ENCOUNTER
Patient's  (Jan) requesting to schedule annual appointment for patient. Appointment scheduled; Mr Montoya agreed to appointment date and time.

## 2022-01-31 ENCOUNTER — EXTERNAL CHRONIC CARE MANAGEMENT (OUTPATIENT)
Dept: PRIMARY CARE CLINIC | Facility: CLINIC | Age: 77
End: 2022-01-31
Payer: MEDICARE

## 2022-01-31 PROCEDURE — 99490 PR CHRONIC CARE MGMT, 1ST 20 MIN: ICD-10-PCS | Mod: S$PBB,,, | Performed by: FAMILY MEDICINE

## 2022-01-31 PROCEDURE — 99490 CHRNC CARE MGMT STAFF 1ST 20: CPT | Mod: PBBFAC,PO | Performed by: FAMILY MEDICINE

## 2022-01-31 PROCEDURE — 99490 CHRNC CARE MGMT STAFF 1ST 20: CPT | Mod: S$PBB,,, | Performed by: FAMILY MEDICINE

## 2022-02-07 ENCOUNTER — PATIENT MESSAGE (OUTPATIENT)
Dept: FAMILY MEDICINE | Facility: CLINIC | Age: 77
End: 2022-02-07
Payer: MEDICARE

## 2022-02-07 ENCOUNTER — TELEPHONE (OUTPATIENT)
Dept: ELECTROPHYSIOLOGY | Facility: CLINIC | Age: 77
End: 2022-02-07
Payer: MEDICARE

## 2022-02-07 ENCOUNTER — TELEPHONE (OUTPATIENT)
Dept: CARDIOLOGY | Facility: HOSPITAL | Age: 77
End: 2022-02-07
Payer: MEDICARE

## 2022-02-07 DIAGNOSIS — K21.9 GASTROESOPHAGEAL REFLUX DISEASE WITHOUT ESOPHAGITIS: ICD-10-CM

## 2022-02-07 RX ORDER — PANTOPRAZOLE SODIUM 40 MG/1
40 TABLET, DELAYED RELEASE ORAL DAILY
Qty: 90 TABLET | Refills: 0 | Status: SHIPPED | OUTPATIENT
Start: 2022-02-07 | End: 2022-02-16 | Stop reason: SDUPTHER

## 2022-02-07 NOTE — TELEPHONE ENCOUNTER
Contacted the pt's  on this am to assist with sending a manual transmission via the pt's remote Pacemaker home monitor.  (please see pt portal message from this am).  The transmission was received and reviewed.  Informed the pt's  that there were no arrhythmia recorded and all is WNL.  Pt with history of pAF and is on Eliquis 5mg one tablet twice daily as note in CV with Dr. Jurado on 1/12/22.  Also informed the  that this information would be sent to Dr. Calderon's RN.  Understanding verbalized.  The  and pt appreciated the call and the assistance.     Of note, programmed A. Tachycardia detection rate is 180 bpm.      Message sent to Dr. Calderon's RN via [email or web address].

## 2022-02-07 NOTE — TELEPHONE ENCOUNTER
Discussed with Dr Calderon. Pt experiencing per device A-Tach. HR low 100. Pt feeling weak. Dr Calderon recommends restarting metoprolol at 12.5mg BID.    Called pt's spouse to advise. He will restart medication and call back in 2-3 days if her HR doesn't stabilize.    ----- Message from Johnny Chris sent at 2/7/2022 11:47 AM CST -----  Ben Pederson'.    Contacted Mr. Dela Cruz on this am and had them send a manual transmission.  The report reveals there were no arrhythmias recorded and all is WNL.  Of note, pt's A. Tachycardia detection rate is programmed @ 180 bpm, meaning it will not record any Atrial high rates below 180 bpm.  I have put a message in the pt's chart that reflects this as well.     Thanks,  Johnny

## 2022-02-08 ENCOUNTER — OFFICE VISIT (OUTPATIENT)
Dept: PULMONOLOGY | Facility: CLINIC | Age: 77
End: 2022-02-08
Payer: MEDICARE

## 2022-02-08 VITALS
HEIGHT: 67 IN | HEART RATE: 79 BPM | WEIGHT: 105.69 LBS | BODY MASS INDEX: 16.59 KG/M2 | SYSTOLIC BLOOD PRESSURE: 120 MMHG | OXYGEN SATURATION: 99 % | DIASTOLIC BLOOD PRESSURE: 72 MMHG

## 2022-02-08 DIAGNOSIS — J98.4 RESTRICTIVE LUNG DISEASE: Primary | ICD-10-CM

## 2022-02-08 PROCEDURE — 99999 PR PBB SHADOW E&M-EST. PATIENT-LVL IV: CPT | Mod: PBBFAC,,, | Performed by: NURSE PRACTITIONER

## 2022-02-08 PROCEDURE — 99999 PR PBB SHADOW E&M-EST. PATIENT-LVL IV: ICD-10-PCS | Mod: PBBFAC,,, | Performed by: NURSE PRACTITIONER

## 2022-02-08 PROCEDURE — 99213 OFFICE O/P EST LOW 20 MIN: CPT | Mod: S$GLB,,, | Performed by: NURSE PRACTITIONER

## 2022-02-08 PROCEDURE — 99213 PR OFFICE/OUTPT VISIT, EST, LEVL III, 20-29 MIN: ICD-10-PCS | Mod: S$GLB,,, | Performed by: NURSE PRACTITIONER

## 2022-02-08 NOTE — PROGRESS NOTES
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 Has resolved. Followed by Dr. Jurado and Dr. Yeh cardiologists. Noticed breathing improved following procedure.  SOB- stable, not needing albuterol inhaler. Able to due daily activities with out stopping to rest as long as she takes her time. Stopped Trelegy for now.   Cough- recurrent complaint, non productive, no current cough.   Able to play flute with difficulty.    8/6/2021- states feeling well, Cough- decreased, states mild, associated with post nasal drip, recurrent complaint, non productive, using Trelegy most days with noticed benfit,   Shortness of breath- recurrent complaint, improves with albuterol rescue inhaler but not needing, going to gym to exercise with no difficulty.   GERD symptoms controlled on medications.     1/26/21-  Complaint of depression currently followed by Psychiatry. Depression medications worsens acid reflux. GERD worsens Asthma symptoms.   Cough- worsened since changing medications, causes gaging,voice is hoarse in last 2 weeks. Occasionally productive clear mucous.   Associated with chest tightness.  No wheeze, SOB- worsened, worse when changing positions, feels palpitations in chest.     07/21/2020- patient is a 75-year-old female with atrial fib, DVT (2014) on pradaxa, kidney infarction, history of stroke, GERD, hyperlipidemia who follows with Dr. Bryson for asthma and chronic sinusitis.  She c/o chronic AVILA especially if she has to walk uphill or push baby stroller. She is improved after using breo. She had URI symptoms in 1/2020 and thinks she may have had COVID-19 with emesis, loss of smell and tasted, aching for about 3 wks.  She had antibody test which was negative. Her  was sick as well. He assists w/ history.  She is a never  smoker but says she has been diagnosed with COPD. Sinuses doing well, uses flonase intermittently. She follows w/ cardiology, has had 3 ablations in the past and sometimes still has a fib.  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    Mark 15, 2019- breo  With  No rescue use, uses flonase, had syncope with  Ankle  Fx.      Oct 16, 2018 pt having sob.  Pt had asthma as child and outgrew.  Pt has had agarwal 2 yrs, no seasonal variation, no clear nocturnal worsening.  Pt has been on intermittent amiodarone with eval /rx Dr Spaulding.  Pt had a fib resolved.  pft in 2014 with vc 35%.  Pt had 3 ablations. On chr xarelto.  No h/o pe.  No edema.  On aldactone.  Pt had transient right paresis - resolved - tia/stroke.    Pt in er 10/14- no wheezes, place empirically on prednisone 40/d with good response.  With albuterol use once breathing felt nl - 1st within last 1-2 yrs.        The chief complaint problem is stable    PFSH:  Past Medical History:   Diagnosis Date    Anticoagulant long-term use     Anxiety     Arthritis     Atrial fibrillation     Cancer     skin    CHF (congestive heart failure)     Depression     DVT (deep venous thrombosis)     GERD (gastroesophageal reflux disease)     Glaucoma (increased eye pressure)     Hyperlipidemia     diet controlled    Hypertension     Interstitial lung disease     Localized hives 1/10/2020    Mild persistent asthma without complication 11/12/2018    Pneumonia 1/31/2014    Stroke 6-3-14    Stroke     TIA (transient ischemic attack)     TIA (transient ischemic attack)          Past Surgical History:   Procedure Laterality Date    2 heart ablations      3 in total    A-V CARDIAC PACEMAKER INSERTION Left 10/14/2021    Procedure: INSERTION, CARDIAC PACEMAKER, DUAL CHAMBER;  Surgeon: Fco Calderon MD;  Location: [facility] EP LAB;  Service: Cardiology;  Laterality: Left;  PAF/ Sour John Arrthymias, Dual PPM, SJM, MAC, GP, 3 PREP     bilateral cataracts      CHOLECYSTECTOMY      COLONOSCOPY N/A 1/19/2017    Procedure: COLONOSCOPY and EGD;  Surgeon: Jonathan Schultz MD;  Location: [facility] ENDO;  Service: Endoscopy;  Laterality: N/A;    COLONOSCOPY N/A 10/10/2019    Procedure: COLONOSCOPY;  Surgeon: Alex Christian MD;  Location: [facility] ENDO;  Service: Endoscopy;  Laterality: N/A;    ESOPHAGOGASTRODUODENOSCOPY N/A 10/14/2019    Procedure: EGD (ESOPHAGOGASTRODUODENOSCOPY);  Surgeon: Alex Christian MD;  Location: [facility] ENDO;  Service: Endoscopy;  Laterality: N/A;    INJECTION OF ANESTHETIC AGENT AROUND MEDIAL BRANCH NERVES INNERVATING CERVICAL FACET JOINT Right 6/7/2018    Procedure: BLOCK, NERVE, FACET JOINT, MEDIAL BRANCH, CERVICAL;  Surgeon: Galo Clancy MD;  Location: [facility] OR;  Service: Pain Management;  Laterality: Right;  C4, 5, 6    OPEN REDUCTION AND INTERNAL FIXATION (ORIF) OF INJURY OF ANKLE Right 11/1/2018    Procedure: ORIF, ANKLE;  Surgeon: Wilfredo Aguero MD;  Location: [facility];  Service: Orthopedics;  Laterality: Right;    pyloristenosis      RADIOFREQUENCY ABLATION OF LUMBAR MEDIAL BRANCH NERVE AT SINGLE LEVEL Bilateral 9/21/2018    Procedure: RADIOFREQUENCY ABLATION, NERVE, SPINAL, LUMBAR, MEDIAL BRANCH, 1 LEVEL;  Surgeon: Galo Clancy MD;  Location: [facility];  Service: Pain Management;  Laterality: Bilateral;  L3, 4, 5    RADIOFREQUENCY ABLATION OF LUMBAR MEDIAL BRANCH NERVE AT SINGLE LEVEL Bilateral 2/19/2019    Procedure: Radiofrequency Ablation, Nerve, Spinal, Lumbar, Medial Branch, 1 Level;  Surgeon: Galo Clancy MD;  Location: [facility] OR;  Service: Pain Management;  Laterality: Bilateral;  L3, 4, 5     RADIOFREQUENCY ABLATION OF LUMBAR MEDIAL BRANCH NERVE AT SINGLE LEVEL Bilateral 3/10/2020    Procedure: Radiofrequency Ablation, Nerve, Spinal, Lumbar, Medial Branch, 1 Level;  Surgeon: Galo Clancy MD;  Location: [facility] OR;  Service: Pain Management;  Laterality: Bilateral;  L3,4,5 - Burned at 80 degrees C. for 60 seconds x 2  each site      RADIOFREQUENCY ABLATION OF LUMBAR MEDIAL BRANCH NERVE AT SINGLE LEVEL Bilateral 9/11/2020    Procedure: Radiofrequency Ablation, Nerve, Spinal, Lumbar, Medial Branch, 1 Level;  Surgeon: Galo Clancy MD;  Location: [facility] OR;  Service: Pain Management;  Laterality: Bilateral;  L3, 4, 5 - Burned at 80 degrees C. for 60 seconds x 2 each site    RADIOFREQUENCY ABLATION OF LUMBAR MEDIAL BRANCH NERVE AT SINGLE LEVEL Bilateral 5/28/2021    Procedure: Radiofrequency Ablation, Nerve, Spinal, Lumbar, Medial Branch, 1 Level;  Surgeon: Galo Clancy MD;  Location: [facility] OR;  Service: Pain Management;  Laterality: Bilateral;  L3,4,5    RADIOFREQUENCY THERMAL COAGULATION OF MEDIAL BRANCH OF POSTERIOR RAMUS OF CERVICAL SPINAL NERVE Right 7/3/2018    Procedure: RADIOFREQUENCY THERMAL COAGULATION, NERVE, SPINAL, CERVICAL, MEDIAL BRANCH OF POSTERIOR RAMUS;  Surgeon: Galo Clancy MD;  Location: [facility] OR;  Service: Pain Management;  Laterality: Right;  C4,5,6 - Burned at 80 degrees C. for 75 seconds each site    RADIOFREQUENCY THERMAL COAGULATION OF MEDIAL BRANCH OF POSTERIOR RAMUS OF CERVICAL SPINAL NERVE Right 7/23/2019    Procedure: RADIOFREQUENCY THERMAL COAGULATION, NERVE, SPINAL, CERVICAL, POSTERIOR RAMUS, MEDIAL BRANCH;  Surgeon: Galo Clancy MD;  Location: [facility] OR;  Service: Pain Management;  Laterality: Right;  C4,5,6    RADIOFREQUENCY THERMAL COAGULATION OF MEDIAL BRANCH OF POSTERIOR RAMUS OF CERVICAL SPINAL NERVE Right 6/23/2020    Procedure: RADIOFREQUENCY THERMAL COAGULATION, NERVE, SPINAL, CERVICAL, POSTERIOR RAMUS, MEDIAL BRANCH;  Surgeon: Galo Clancy MD;  Location: [facility] OR;  Service: Pain Management;  Laterality: Right;  C4, 5, 6    RADIOFREQUENCY THERMOCOAGULATION Bilateral 9/10/2019    Procedure: RADIOFREQUENCY THERMAL COAGULATION LUMBAR;  Surgeon: Galo Clancy MD;  Location: [facility] OR;  Service: Pain Management;  Laterality: Bilateral;  L3,4,5 - Burned at 80 degrees C. for 60 seconds x 2 each site    skin  cancer removal       TONSILLECTOMY       Social History     Tobacco Use    Smoking status: Never Smoker    Smokeless tobacco: Never Used   Substance Use Topics    Alcohol use: No    Drug use: No     Family History   Problem Relation Age of Onset    Heart disease Father     Ulcers Father     Arthritis Mother     Asthma Mother     Rheum arthritis Mother     Pneumonia Mother     Depression Son     Alzheimer's disease Maternal Uncle     Rheum arthritis Maternal Grandmother     Emphysema Maternal Grandfather     Cancer Maternal Grandfather         kidney    Kidney disease Maternal Grandfather     Cancer Paternal Grandmother         lung= smoker    Pneumonia Paternal Grandfather     Breast cancer Neg Hx     Ovarian cancer Neg Hx      Review of patient's allergies indicates:   Allergen Reactions    Gabapentin Hallucinations    Xarelto [rivaroxaban] Rash    Bactrim [sulfamethoxazole-trimethoprim] Other (See Comments)     Upset stomach, dry heaves, confusion    Afrin (pseudoephedrine)     Amoxicillin-pot clavulanate      Other reaction(s): Mental Status Change    Atorvastatin      Other reaction(s): Joint pain    Baclofen     Baclofen (bulk) Nausea And Vomiting    Ciprofloxacin     Ciprofloxacin (bulk)     Decongest tabs      Other reaction(s): increased heart rate    Decongestant [pseudoephedrine hcl]     Erythromycin Other (See Comments)    Flecainide Hives     And SOB. No reaction to Lidocaine     Fluoxetine      Other reaction(s): heart palpitations  Other reaction(s): anxiety    Lisinopril Other (See Comments)     cough    Losartan Other (See Comments)     Hypotension with lightheadedness    Morphine Other (See Comments)     confusion    Tramadol Other (See Comments)     SOB, low BP    Venlafaxine     Venlafaxine analogues      Changes in BP and increases heart rate       Afrin [oxymetazoline] Palpitations    Caffeine Palpitations    Dabigatran etexilate Rash    Tizanidine  "Anxiety     dizziness       Performance Status:The patient's activity level is functions out of house.      Review of Systems:  a review of eleven systems covering constitutional, Eye, HEENT, Psych, Respiratory, Cardiac, GI, , Musculoskeletal, Endocrine, Dermatologic was negative except for pertinent findings as listed ABOVE and below:  Shortness of breath    Exam:Comprehensive exam done. /72 (BP Location: Left arm, Patient Position: Sitting, BP Method: Pediatric (Automatic))   Pulse 79   Ht 5' 7" (1.702 m)   Wt 48 kg (105 lb 11.4 oz)   SpO2 99% Comment: on room air at rest  BMI 16.56 kg/m²   Exam included Vitals as listed, and patient's appearance and affect and alertness and mood, oral exam for yeast and hygiene and pharynx lesions and Mallapatti (M) score, neck with inspection for jvd and masses and thyroid abnormalities and lymph nodes (supraclavicular and infraclavicular nodes and axillary also examined and noted if abn), chest exam included symmetry and effort and fremitus and percussion and auscultation, cardiac exam included rhythm and gallops and murmur and rubs and jvd and edema, abdominal exam for mass and hepatosplenomegaly and tenderness and hernias and bowel sounds, Musculoskeletal exam with muscle tone and posture and mobility/gait and  strength, and skin for rashes and cyanosis and pallor and turgor, extremity for clubbing.  Findings were normal except for pertinent findings listed below:  M1, Thin, BS Clear   No edema    Radiographs (ct chest and cxr) reviewed: view by direct vision   X-Ray Chest AP Portable   11/15/2021 enlarged heart, lungs clear    XR CHEST AP PORTABLE  10/14/2021 mild pulmonary edema     CXR 1/23/20- cardiomegaly, prominence of interstitial markings, peribronchial cuffing, RLL  infiltrates  CTA chest 2014- mosaicism consistent with air trapping, bibasilar infiltrates, small right pleural effusion, cardiomegaly with enlarged RV    Labs reviewed    Results for " AYLA SWIFT (MRN [id number]) as of 7/21/2020 09:27   Ref. Range 6/11/2020 11:33   Eosinophil% Latest Ref Range: 0.0 - 8.0 % 4.2   Eos # Latest Ref Range: 0.0 - 0.5 K/uL 0.4    Results for AYLA SWIFT (MRN [id number]) as of 7/21/2020 09:27   Ref. Range 10/14/2019 00:12   Mono # Latest Ref Range: 0.3 - 1.0 K/uL 2.0 (H)   Eosinophil% Latest Ref Range: 0.0 - 8.0 % 0.8       PFT results reviewed 2014- spirometry shows severe restriction.  Lung volumes and DLCO were not performed        Plan:  Clinical impression is apparently straight forward and impression with management as below.     Ayla was seen today for 6m f/u.    Diagnoses and all orders for this visit:    Restrictive lung disease     - continue current medication regiment    Follow up in about 6 months (around 8/8/2022), or if symptoms worsen or fail to improve.    Discussed with patient above for education the following:      Patient Instructions   continue Trelegy and Albuterol for shortness of breath when needed.      Contact clinic if cough returns

## 2022-02-08 NOTE — PATIENT INSTRUCTIONS
continue Trelegy and Albuterol for shortness of breath when needed.      Contact clinic if cough returns

## 2022-02-10 DIAGNOSIS — I50.32 CHRONIC DIASTOLIC HEART FAILURE: ICD-10-CM

## 2022-02-10 DIAGNOSIS — I11.0 HYPERTENSIVE LEFT VENTRICULAR HYPERTROPHY WITH HEART FAILURE: Primary | ICD-10-CM

## 2022-02-10 DIAGNOSIS — Z95.0 CARDIAC PACEMAKER IN SITU: ICD-10-CM

## 2022-02-10 DIAGNOSIS — I48.0 PAF (PAROXYSMAL ATRIAL FIBRILLATION): ICD-10-CM

## 2022-02-16 ENCOUNTER — OFFICE VISIT (OUTPATIENT)
Dept: FAMILY MEDICINE | Facility: CLINIC | Age: 77
End: 2022-02-16
Attending: FAMILY MEDICINE
Payer: MEDICARE

## 2022-02-16 VITALS
DIASTOLIC BLOOD PRESSURE: 65 MMHG | OXYGEN SATURATION: 98 % | BODY MASS INDEX: 16.61 KG/M2 | RESPIRATION RATE: 18 BRPM | SYSTOLIC BLOOD PRESSURE: 100 MMHG | WEIGHT: 105.81 LBS | HEIGHT: 67 IN | HEART RATE: 76 BPM | TEMPERATURE: 98 F

## 2022-02-16 DIAGNOSIS — I48.0 PAROXYSMAL ATRIAL FIBRILLATION: ICD-10-CM

## 2022-02-16 DIAGNOSIS — Z86.73 H/O: CVA (CEREBROVASCULAR ACCIDENT): ICD-10-CM

## 2022-02-16 DIAGNOSIS — J45.20 MILD INTERMITTENT ASTHMA WITHOUT COMPLICATION: ICD-10-CM

## 2022-02-16 DIAGNOSIS — E78.5 HYPERLIPIDEMIA, UNSPECIFIED HYPERLIPIDEMIA TYPE: ICD-10-CM

## 2022-02-16 DIAGNOSIS — R41.89 COGNITIVE IMPAIRMENT: ICD-10-CM

## 2022-02-16 DIAGNOSIS — I10 PRIMARY HYPERTENSION: ICD-10-CM

## 2022-02-16 DIAGNOSIS — I50.32 CHRONIC DIASTOLIC HEART FAILURE: ICD-10-CM

## 2022-02-16 DIAGNOSIS — Z98.890 S/P ABLATION OF ATRIAL FLUTTER: ICD-10-CM

## 2022-02-16 DIAGNOSIS — F41.1 GAD (GENERALIZED ANXIETY DISORDER): ICD-10-CM

## 2022-02-16 DIAGNOSIS — K21.9 GASTROESOPHAGEAL REFLUX DISEASE WITHOUT ESOPHAGITIS: ICD-10-CM

## 2022-02-16 DIAGNOSIS — Z00.00 ENCOUNTER FOR PREVENTIVE HEALTH EXAMINATION: Primary | ICD-10-CM

## 2022-02-16 DIAGNOSIS — Z86.79 S/P ABLATION OF ATRIAL FLUTTER: ICD-10-CM

## 2022-02-16 DIAGNOSIS — H40.9 GLAUCOMA, UNSPECIFIED GLAUCOMA TYPE, UNSPECIFIED LATERALITY: ICD-10-CM

## 2022-02-16 DIAGNOSIS — Z78.0 MENOPAUSE: ICD-10-CM

## 2022-02-16 DIAGNOSIS — F33.41 RECURRENT MAJOR DEPRESSIVE DISORDER, IN PARTIAL REMISSION: ICD-10-CM

## 2022-02-16 PROCEDURE — 99397 PER PM REEVAL EST PAT 65+ YR: CPT | Mod: S$GLB,,, | Performed by: FAMILY MEDICINE

## 2022-02-16 PROCEDURE — 99397 PR PREVENTIVE VISIT,EST,65 & OVER: ICD-10-PCS | Mod: S$GLB,,, | Performed by: FAMILY MEDICINE

## 2022-02-16 PROCEDURE — 99999 PR PBB SHADOW E&M-EST. PATIENT-LVL IV: CPT | Mod: PBBFAC,,, | Performed by: FAMILY MEDICINE

## 2022-02-16 PROCEDURE — 99999 PR PBB SHADOW E&M-EST. PATIENT-LVL IV: ICD-10-PCS | Mod: PBBFAC,,, | Performed by: FAMILY MEDICINE

## 2022-02-16 RX ORDER — PANTOPRAZOLE SODIUM 40 MG/1
40 TABLET, DELAYED RELEASE ORAL DAILY
Qty: 90 TABLET | Refills: 3 | Status: SHIPPED | OUTPATIENT
Start: 2022-02-16 | End: 2023-02-23 | Stop reason: SDUPTHER

## 2022-02-16 NOTE — PROGRESS NOTES
Subjective:       Patient ID: Ayla Dela Cruz is a 76 y.o. female.    Chief Complaint: Annual Exam (Pt states that she is here for her annual exam )    77-year-old female coming in for an annual exam.  She has had a dwindling down hill course over the last several years which her  attributes to taking her off of Limbitrol several years ago.  This was done as a safety measure due to high risk of falls with the combination tricyclic and benzodiazepine at her age.  She has had a CVA, she has spondylosis of the cervical and lumbar spine, she has mild cognitive impairment, she has underlying generalized anxiety disorder and major depression followed by Psychiatry, glaucoma, mild persistent asthma and interstitial lung disease, paroxysmal atrial fibrillation status post three ablation is a with a chads five score on chronic anticoagulation.  She has diastolic heart failure, hypertension, hyperlipidemia, history of deep vein thrombosis, iron deficiency anemia, protein calorie mound nutrition osteoarthritis and reflux.  She recently was started on Omeprazole for her reflux and reports good relief of her symptoms.    Past Medical History:  No date: Anticoagulant long-term use  No date: Anxiety  No date: Arthritis  No date: Atrial fibrillation  No date: Cancer      Comment:  skin  No date: CHF (congestive heart failure)  No date: Depression  No date: DVT (deep venous thrombosis)  No date: GERD (gastroesophageal reflux disease)  No date: Glaucoma (increased eye pressure)  No date: Hyperlipidemia      Comment:  diet controlled  No date: Hypertension  No date: Interstitial lung disease  1/10/2020: Localized hives  11/12/2018: Mild persistent asthma without complication  1/31/2014: Pneumonia  6-3-14: Stroke  No date: Stroke  No date: TIA (transient ischemic attack)  No date: TIA (transient ischemic attack)    Past Surgical History:  No date: 2 heart ablations      Comment:  3 in total  10/14/2021: A-V CARDIAC  PACEMAKER INSERTION; Left      Comment:  Procedure: INSERTION, CARDIAC PACEMAKER, DUAL CHAMBER;                 Surgeon: Fco Calderon MD;  Location: [facility] EP LAB;                 Service: Cardiology;  Laterality: Left;  PAF/ Stephan                Arrthymias, Dual PPM, SJM, MAC, GP, 3 PREP  No date: bilateral cataracts  No date: CHOLECYSTECTOMY  1/19/2017: COLONOSCOPY; N/A      Comment:  Procedure: COLONOSCOPY and EGD;  Surgeon: Jonathan Schultz MD;  Location: [facility] ENDO;  Service: Endoscopy;                 Laterality: N/A;  10/10/2019: COLONOSCOPY; N/A      Comment:  Procedure: COLONOSCOPY;  Surgeon: Alex Christian MD;                Location: [facility] ENDO;  Service: Endoscopy;  Laterality:                N/A;  10/14/2019: ESOPHAGOGASTRODUODENOSCOPY; N/A      Comment:  Procedure: EGD (ESOPHAGOGASTRODUODENOSCOPY);  Surgeon:                Alex Christian MD;  Location: [facility] ENDO;  Service:                Endoscopy;  Laterality: N/A;  6/7/2018: INJECTION OF ANESTHETIC AGENT AROUND MEDIAL BRANCH NERVES   INNERVATING CERVICAL FACET JOINT; Right      Comment:  Procedure: BLOCK, NERVE, FACET JOINT, MEDIAL BRANCH,                CERVICAL;  Surgeon: Galo Clancy MD;  Location: [facility] OR;                 Service: Pain Management;  Laterality: Right;  C4, 5, 6  11/1/2018: OPEN REDUCTION AND INTERNAL FIXATION (ORIF) OF INJURY OF   ANKLE; Right      Comment:  Procedure: ORIF, ANKLE;  Surgeon: Wilfredo Aguero MD;                Location: [facility] OR;  Service: Orthopedics;  Laterality:                Right;  No date: pyloristenosis  9/21/2018: RADIOFREQUENCY ABLATION OF LUMBAR MEDIAL BRANCH NERVE AT   SINGLE LEVEL; Bilateral      Comment:  Procedure: RADIOFREQUENCY ABLATION, NERVE, SPINAL,                LUMBAR, MEDIAL BRANCH, 1 LEVEL;  Surgeon: Galo Clancy MD;               Location: [facility] OR;  Service: Pain Management;                 Laterality: Bilateral;  L3, 4, 5  2/19/2019: RADIOFREQUENCY ABLATION OF LUMBAR  MEDIAL BRANCH NERVE AT   SINGLE LEVEL; Bilateral      Comment:  Procedure: Radiofrequency Ablation, Nerve, Spinal,                Lumbar, Medial Branch, 1 Level;  Surgeon: Galo Clancy MD;               Location: [facility] OR;  Service: Pain Management;                 Laterality: Bilateral;  L3, 4, 5   3/10/2020: RADIOFREQUENCY ABLATION OF LUMBAR MEDIAL BRANCH NERVE AT   SINGLE LEVEL; Bilateral      Comment:  Procedure: Radiofrequency Ablation, Nerve, Spinal,                Lumbar, Medial Branch, 1 Level;  Surgeon: Galo Clancy MD;               Location: [facility] OR;  Service: Pain Management;                 Laterality: Bilateral;  L3,4,5 - Burned at 80 degrees C.                for 60 seconds x 2 each site    9/11/2020: RADIOFREQUENCY ABLATION OF LUMBAR MEDIAL BRANCH NERVE AT   SINGLE LEVEL; Bilateral      Comment:  Procedure: Radiofrequency Ablation, Nerve, Spinal,                Lumbar, Medial Branch, 1 Level;  Surgeon: Galo Clancy MD;               Location: [facility] OR;  Service: Pain Management;                 Laterality: Bilateral;  L3, 4, 5 - Burned at 80 degrees                C. for 60 seconds x 2 each site  5/28/2021: RADIOFREQUENCY ABLATION OF LUMBAR MEDIAL BRANCH NERVE AT   SINGLE LEVEL; Bilateral      Comment:  Procedure: Radiofrequency Ablation, Nerve, Spinal,                Lumbar, Medial Branch, 1 Level;  Surgeon: Galo Clancy MD;               Location: [facility] OR;  Service: Pain Management;                 Laterality: Bilateral;  L3,4,5  7/3/2018: RADIOFREQUENCY THERMAL COAGULATION OF MEDIAL BRANCH OF   POSTERIOR RAMUS OF CERVICAL SPINAL NERVE; Right      Comment:  Procedure: RADIOFREQUENCY THERMAL COAGULATION, NERVE,                SPINAL, CERVICAL, MEDIAL BRANCH OF POSTERIOR RAMUS;                 Surgeon: Galo Clancy MD;  Location: [facility] OR;  Service:                Pain Management;  Laterality: Right;  C4,5,6 - Burned at                80 degrees C. for 75 seconds each site  7/23/2019: RADIOFREQUENCY THERMAL  COAGULATION OF MEDIAL BRANCH OF   POSTERIOR RAMUS OF CERVICAL SPINAL NERVE; Right      Comment:  Procedure: RADIOFREQUENCY THERMAL COAGULATION, NERVE,                SPINAL, CERVICAL, POSTERIOR RAMUS, MEDIAL BRANCH;                 Surgeon: Galo Clancy MD;  Location: [facility] OR;  Service:                Pain Management;  Laterality: Right;  C4,5,6  6/23/2020: RADIOFREQUENCY THERMAL COAGULATION OF MEDIAL BRANCH OF   POSTERIOR RAMUS OF CERVICAL SPINAL NERVE; Right      Comment:  Procedure: RADIOFREQUENCY THERMAL COAGULATION, NERVE,                SPINAL, CERVICAL, POSTERIOR RAMUS, MEDIAL BRANCH;                 Surgeon: Galo Clancy MD;  Location: [facility] OR;  Service:                Pain Management;  Laterality: Right;  C4, 5, 6  9/10/2019: RADIOFREQUENCY THERMOCOAGULATION; Bilateral      Comment:  Procedure: RADIOFREQUENCY THERMAL COAGULATION LUMBAR;                 Surgeon: Galo Clancy MD;  Location: [facility] OR;  Service:                Pain Management;  Laterality: Bilateral;  L3,4,5 - Burned               at 80 degrees C. for 60 seconds x 2 each site  No date: skin cancer removal   No date: TONSILLECTOMY    Review of patient's family history indicates:  Problem: Heart disease      Relation: Father          Age of Onset: (Not Specified)  Problem: Ulcers      Relation: Father          Age of Onset: (Not Specified)  Problem: Arthritis      Relation: Mother          Age of Onset: (Not Specified)  Problem: Asthma      Relation: Mother          Age of Onset: (Not Specified)  Problem: Rheum arthritis      Relation: Mother          Age of Onset: (Not Specified)  Problem: Pneumonia      Relation: Mother          Age of Onset: (Not Specified)  Problem: Depression      Relation: Son          Age of Onset: (Not Specified)  Problem: Alzheimer's disease      Relation: Maternal Uncle          Age of Onset: (Not Specified)  Problem: Rheum arthritis      Relation: Maternal Grandmother          Age of Onset: (Not Specified)  Problem:  Emphysema      Relation: Maternal Grandfather          Age of Onset: (Not Specified)  Problem: Cancer      Relation: Maternal Grandfather          Age of Onset: (Not Specified)          Comment: kidney  Problem: Kidney disease      Relation: Maternal Grandfather          Age of Onset: (Not Specified)  Problem: Cancer      Relation: Paternal Grandmother          Age of Onset: (Not Specified)          Comment: lung= smoker  Problem: Pneumonia      Relation: Paternal Grandfather          Age of Onset: (Not Specified)  Problem: Breast cancer      Relation: Neg Hx          Age of Onset: (Not Specified)  Problem: Ovarian cancer      Relation: Neg Hx          Age of Onset: (Not Specified)    Social History    Tobacco Use      Smoking status: Never Smoker      Smokeless tobacco: Never Used    Alcohol use: No    Drug use: No    Current Outpatient Medications on File Prior to Visit:  apixaban (ELIQUIS) 5 mg Tab, Take 1 tablet (5 mg total) by mouth 2 (two) times daily., Disp: 60 tablet, Rfl: 11  ARIPiprazole (ABILIFY) 15 MG Tab, Take 15 mg by mouth once daily., Disp: , Rfl:   aspirin 81 MG Chew, TAKE 1 TABLET BY MOUTH EVERY DAY, Disp: 90 tablet, Rfl: 4  ATIVAN 0.5 mg tablet, Take 0.5 mg by mouth 2 (two) times daily as needed for Anxiety. , Disp: , Rfl:   atropine 1% (ISOPTO ATROPINE) 1 % Drop, Place 1 drop into the right eye once daily., Disp: , Rfl:   dorzolamide-timolol 2-0.5% (COSOPT) 22.3-6.8 mg/mL ophthalmic solution, Place 1 drop into both eyes 2 (two) times daily. , Disp: , Rfl:   gabapentin (NEURONTIN) 300 MG capsule, TAKE 1 CAPSULE BY MOUTH THREE TIMES A DAY (Patient taking differently: Take 100 mg by mouth every evening.), Disp: 90 capsule, Rfl: 1  ondansetron (ZOFRAN-ODT) 8 MG TbDL, Take 8 mg by mouth every 24 hours as needed (nausea). , Disp: , Rfl:   rosuvastatin (CRESTOR) 40 MG Tab, TAKE 1 TABLET (40 MG TOTAL) BY MOUTH EVERY EVENING, Disp: 90 tablet, Rfl: 3  TRINTELLIX 10 mg Tab, Take 1 tablet by mouth nightly.,  Disp: , Rfl:   (DISCONTINUED) metoprolol tartrate (LOPRESSOR) 25 MG tablet, Take 1 tablet (25 mg total) by mouth 2 (two) times daily., Disp: 60 tablet, Rfl: 11  (DISCONTINUED) pantoprazole (PROTONIX) 40 MG tablet, Take 1 tablet (40 mg total) by mouth once daily., Disp: 90 tablet, Rfl: 0  TRELEGY ELLIPTA 200-62.5-25 mcg inhaler, Inhale 1 puff into the lungs once daily., Disp: , Rfl:     Current Facility-Administered Medications on File Prior to Visit:  bupivacaine (PF) 0.25% (2.5 mg/ml) injection, , , PRN, Galo Clancy MD, 6 mL at 02/19/19 1314  lidocaine (PF) 10 mg/ml (1%) injection, , , PRN, Galo Clancy MD, 9 mL at 02/19/19 1304  lidocaine (PF) 20 mg/ml (2%) injection, , , PRN, Galo Clancy MD, 6 mL at 02/19/19 1310  methylPREDNISolone acetate injection, , , PRN, Galo Clancy MD, 80 mg at 02/19/19 1314          Review of Systems   Constitutional: Negative for chills, diaphoresis, fatigue, fever and unexpected weight change.   HENT: Negative for congestion, ear pain, hearing loss, postnasal drip and sinus pressure.    Eyes: Negative for itching and visual disturbance.   Respiratory: Negative for cough, chest tightness, shortness of breath and wheezing.    Cardiovascular: Negative for chest pain, palpitations and leg swelling.   Gastrointestinal: Negative for abdominal pain, blood in stool, constipation, diarrhea, nausea and vomiting.   Genitourinary: Negative for dysuria, frequency and hematuria.   Musculoskeletal: Positive for gait problem (Still prone to falling and currently has been put on lorazepam by her therapist). Negative for arthralgias, back pain, joint swelling and myalgias.   Neurological: Negative for dizziness and headaches.   Hematological: Negative for adenopathy.   Psychiatric/Behavioral: Positive for dysphoric mood. Negative for sleep disturbance. The patient is nervous/anxious.        Objective:      Physical Exam  Vitals and nursing note reviewed.   Constitutional:       General: She is not in  acute distress.     Appearance: She is well-developed and well-nourished. She is not ill-appearing, toxic-appearing or diaphoretic.      Comments: Good blood pressure control  Underweight with a BMI of 16.6 she is up 2.2 lb from her February 22, 2021 visit   HENT:      Head: Normocephalic and atraumatic.      Right Ear: Tympanic membrane, ear canal and external ear normal. There is no impacted cerumen.      Left Ear: Tympanic membrane, ear canal and external ear normal. There is no impacted cerumen.      Nose: Nose normal. No congestion or rhinorrhea.      Mouth/Throat:      Mouth: Oropharynx is clear and moist. Mucous membranes are moist.      Pharynx: Oropharynx is clear. No oropharyngeal exudate or posterior oropharyngeal erythema.   Eyes:      General: No scleral icterus.        Right eye: No discharge.         Left eye: No discharge.      Extraocular Movements: Extraocular movements intact.      Conjunctiva/sclera: Conjunctivae normal.      Pupils: Pupils are equal, round, and reactive to light.   Neck:      Thyroid: No thyromegaly.      Vascular: No carotid bruit or JVD.   Cardiovascular:      Rate and Rhythm: Normal rate and regular rhythm.      Heart sounds: Normal heart sounds. No murmur heard.  No friction rub. No gallop.    Pulmonary:      Effort: Pulmonary effort is normal. No respiratory distress.      Breath sounds: Normal breath sounds. No stridor. No wheezing, rhonchi or rales.   Chest:      Chest wall: No tenderness.   Abdominal:      General: Bowel sounds are normal. There is no distension.      Palpations: Abdomen is soft. There is no mass.      Tenderness: There is no abdominal tenderness. There is no guarding or rebound.      Hernia: No hernia is present.   Musculoskeletal:         General: No swelling, tenderness, deformity, signs of injury or edema. Normal range of motion.      Cervical back: Normal range of motion and neck supple. No rigidity or tenderness.      Right lower leg: No edema.       Left lower leg: No edema.   Lymphadenopathy:      Cervical: No cervical adenopathy.   Skin:     General: Skin is warm and dry.      Coloration: Skin is not jaundiced or pale.      Findings: No bruising, erythema, lesion or rash.   Neurological:      General: No focal deficit present.      Mental Status: She is alert and oriented to person, place, and time. Mental status is at baseline.      Cranial Nerves: No cranial nerve deficit.      Sensory: No sensory deficit.      Motor: No weakness.      Coordination: Coordination normal.      Gait: Gait normal.      Deep Tendon Reflexes: Reflexes are normal and symmetric. Reflexes normal.   Psychiatric:         Mood and Affect: Mood and affect and mood normal.         Behavior: Behavior normal.         Thought Content: Thought content normal.         Judgment: Judgment normal.         Assessment:       1. Encounter for preventive health examination    2. H/O: CVA (cerebrovascular accident), June 2014    3. Cognitive impairment    4. JOSE (generalized anxiety disorder)    5. Recurrent major depressive disorder, in partial remission    6. Glaucoma, unspecified glaucoma type, unspecified laterality    7. Mild intermittent asthma without complication    8. Paroxysmal atrial fibrillation, ablations X 3 1995, 1997, 9/2017, CHADS-VAS score 5, HAS-BLED score 5     9. Primary hypertension    10. Hyperlipidemia, unspecified hyperlipidemia type    11. Chronic diastolic heart failure, 2014    12. S/P ablation of atrial flutter    13. Menopause    14. Gastroesophageal reflux disease without esophagitis    15. BMI less than 19,adult        Plan:       1. Encounter for preventive health examination    2. H/O: CVA (cerebrovascular accident), June 2014  Largely recovered    3. Cognitive impairment  Stable    4. JOSE (generalized anxiety disorder)  Followed by Psychiatry, I would suggest possibly putting her on BuSpar rather than a benzodiazepine    5. Recurrent major depressive disorder, in  partial remission  Followed by Psychiatry    6. Glaucoma, unspecified glaucoma type, unspecified laterality  Stable, followed by Ophthalmology    7. Mild intermittent asthma without complication  Asymptomatic    8. Paroxysmal atrial fibrillation, ablations X 3 1995, 1997, 9/2017, CHADS-VAS score 5, HAS-BLED score 5   Currently normal sinus rhythm with history of three ablation Z and finally a pacemaker    9. Primary hypertension  Good control no changes needed    10. Hyperlipidemia, unspecified hyperlipidemia type  Lab pending  - Lipid Panel; Future    11. Chronic diastolic heart failure, 2014  Asymptomatic    12. S/P ablation of atrial flutter  See above    13. Menopause  Bone density due and will be scheduled  - DXA Bone Density Spine And Hip; Future    14. Gastroesophageal reflux disease without esophagitis  Well controlled by Protonix, discussed supplementation of B vitamins and calcium with long-term PPI use  - pantoprazole (PROTONIX) 40 MG tablet; Take 1 tablet (40 mg total) by mouth once daily.  Dispense: 90 tablet; Refill: 3    15. BMI less than 19,adult  Continue raising weight, appetite appears to be very good right now.  She still has long-term suppression of taste and smell following COVID in 2020 but reports it is starting to improve

## 2022-02-17 ENCOUNTER — HOSPITAL ENCOUNTER (OUTPATIENT)
Dept: RADIOLOGY | Facility: HOSPITAL | Age: 77
Discharge: HOME | End: 2022-02-17
Attending: FAMILY MEDICINE
Payer: MEDICARE

## 2022-02-17 DIAGNOSIS — Z78.0 MENOPAUSE: ICD-10-CM

## 2022-02-17 PROCEDURE — 77080 DXA BONE DENSITY AXIAL: CPT | Mod: TC

## 2022-02-17 PROCEDURE — 77080 DEXA BONE DENSITY SPINE HIP: ICD-10-PCS | Mod: 26,,, | Performed by: RADIOLOGY

## 2022-02-17 PROCEDURE — 77080 DXA BONE DENSITY AXIAL: CPT | Mod: 26,,, | Performed by: RADIOLOGY

## 2022-02-21 DIAGNOSIS — I48.0 PAF (PAROXYSMAL ATRIAL FIBRILLATION): Primary | ICD-10-CM

## 2022-03-07 NOTE — PROGRESS NOTES
aSubjective:      Cardiologist: Barrett Jurado MD    I had the pleasure of seeing Ayla Dela Cruz in follow up for her history of atrial arrhythmias and PVI. She last saw me in 9/2021. She is a 76 year old female with HTN, HLD, and TIA in 2014, whose history of arrhythmias dates back to her 30s when she began to experience sudden onset palpitations. She was started on Tambacor at that time, which improved her symptoms. She was started on Coumadin at age 55 years. In the mid-1990s, she underwent an ablation for what sounds like atrial flutter in [place], [place]. In 1997 she underwent a second ablation which she was told was unsuccessful. Since that time she has undergone two electrical cardioversions, once in the mid-2000s (which lasted 5 years), and another around 2010. Around 2014 her arrhythmias recurred around the time she had her TIA, for which she was placed on Xarelto.    I reviewed all ECGs in the EMR at her initial office visit. ECGs from 1944-1444 showed sinus rhythm. ECGs from 1/2014 and 4/2014 showed AF. ECGs between 6/2014 and 1/2016 showed sinus bradycardia and NSR. All ECGs between 1/16/2016 and 5/2017 showed either AF or AFL. When in AFL, RVR was generally seen.    In 9/2017, a PVI was performed. All four PVs were reconnected from a prior PVI, and successfully reisolated. A septal MA flutter line was also created due to frequent inductions during the case. The existing CTI-line was found to be intact. She was discharged on Amiodarone 100 mg daily. At her 11/2017 follow-up, Ms. Dela Cruz was feeling well apart from occasional AVILA relieved with lasix. Her energy level had vastly improved following ablation. Amiodarone was stopped at that visit (I was concerned it was causing her intermittent AVILA). Stopping Amiodarone improved her symptoms. A 48 hour Holter monitor performed in 11/2017 showed sinus rhythm with an average HR of 58 bpm.     At her 2/2018 visit Ms. Dela Cruz continued to feel well,  with no complaints. However, beginning in early 8/2018, Ms. Dela Cruz began to note significant AVILA, lightheadedness, and dizziness, which temporally correlated with her starting Tramadol. She had been off this medication for a few days. She also cut losartan from 100 mg to 50 mg once daily on her own. At her 9/2018 visit I reviewed recent HR and BP trends, with SBPs frequently in the 80-90s mmHg range. At that visit I stopped losartan. A 48 hour Holter monitor showed sinus rhythm with an average HR of 63 bpm, with a 6.6% PAC burden.     At her visit in 11/2018, Ms. Dela Cruz had an episode of near syncope in [facility], which resulted in a right ankle break for which she required surgery. Otherwise she was feeling much better, with improved breathing and lightheadedness.    At her 1/2020 visit, she described multiple issues with NOACs. She specifically described pruritis on Xarelto, and pruritis on Eliquis. At the time she was on Pradaxa and tolerating it. Ms. 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    At her 11/2020 visit Ms. Dela Cruz was tolerating pradaxa, and denied GI bleeding events. She had irregular heartbeats on occasion but not palpitations. At her request, pradaxa was switched back to eliquis.     ECGs from earlier in 2021 show profound sinus bradycardia with occasional extrasystoles and compensatory pauses (beta blocker has since been down-titrated). In 9/2021 Ms. Dela Cruz presented to [facility] with palpitations and skipped beats. The initial ECG was consistent with type 1 second-degree AVB with occasional extraystoles, the second consistent with a paroxysms of atypical flutter. ECGs associated with a negative NST also shows periods consistent with atypical flutter.    At her last visit in 9/2021 we discussed options including re-do PVI vs [employer] dual chamber pacemaker implantation and sotalol initiation. She chose the latter,  which was performed in 10/2021. She was not started on an antiarrhythmic post-procedure.    I reviewed today's device interrogation which shows stable device/lead function, RA pacing 47%, RV pacing 6.1%, 10 AMS episodes (longest 19 minutes), AF burden <1%, battery 9-10 years.    My interpretation of today's ECG is atrial pacing at 68 bpm with a QTc of 467 ms.    Review of Systems   Constitutional: Negative for decreased appetite, malaise/fatigue, weight gain and weight loss.   HENT: Negative for sore throat.    Eyes: Negative for blurred vision.   Cardiovascular: Positive for irregular heartbeat. Negative for chest pain, dyspnea on exertion, leg swelling, near-syncope, orthopnea, palpitations, paroxysmal nocturnal dyspnea and syncope.   Respiratory: Negative for shortness of breath.    Skin: Negative for rash.   Musculoskeletal: Negative for arthritis.   Gastrointestinal: Negative for abdominal pain.   Neurological: Negative for focal weakness and light-headedness.   Psychiatric/Behavioral: Negative for altered mental status.        Objective:    Physical Exam  Vitals reviewed.   Constitutional:       General: She is not in acute distress.     Appearance: She is well-developed.   HENT:      Head: Normocephalic and atraumatic.   Eyes:      General: No scleral icterus.     Conjunctiva/sclera: Conjunctivae normal.      Pupils: Pupils are equal, round, and reactive to light.   Neck:      Thyroid: No thyromegaly.      Vascular: No JVD.   Cardiovascular:      Rate and Rhythm: Normal rate and regular rhythm.      Pulses: Intact distal pulses.      Heart sounds: Normal heart sounds. No murmur heard.    No friction rub. No gallop.   Pulmonary:      Effort: Pulmonary effort is normal. No respiratory distress.      Breath sounds: Normal breath sounds. No rales.   Abdominal:      General: Bowel sounds are normal. There is no distension.      Palpations: Abdomen is soft.   Musculoskeletal:      Cervical back: Normal range of  motion and neck supple.   Skin:     General: Skin is warm and dry.   Neurological:      Mental Status: She is alert and oriented to person, place, and time.   Psychiatric:         Behavior: Behavior normal.           Assessment:       1. Paroxysmal atrial fibrillation, ablations X 3 1995, 1997, 9/2017, CHADS-VAS score 5, HAS-BLED score 5     2. Primary hypertension    3. Mixed hyperlipidemia    4. S/P ablation of atrial flutter    5. Cardiac pacemaker in situ, [employer], dual chamber, 10/14/2021         Plan:     In summary, Ayla Dela Cruz is a 76 year old female with a longstanding history of atrial arrhythmias and prior ablations at outside institutions. Her [id number] Score is 5 (age, female, TIA, HTN) so she should remain on anticoagulation indefinitely. She is now 4 years post-PVI and MA flutter line, and was maintaining sinus rhythm off Amiodarone until 9/2021. She is now status-post dual chamber pacemaker implantation. AMS burden <1%    Plan is to hold the course and see me again in 1 year. Should she have increased AF burden the plan will be to start sotalol 40 mg bid.    Ms. Dela Cruz had a likely cardioembolic event after stopping AC for a week. 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     Thank you for allowing me to participate in the care of this patient. Please do not hesitate to call me with any questions or concerns.

## 2022-03-09 ENCOUNTER — OFFICE VISIT (OUTPATIENT)
Dept: CARDIOLOGY | Facility: CLINIC | Age: 77
End: 2022-03-09
Payer: MEDICARE

## 2022-03-09 VITALS
WEIGHT: 103 LBS | HEIGHT: 67 IN | HEART RATE: 46 BPM | SYSTOLIC BLOOD PRESSURE: 116 MMHG | BODY MASS INDEX: 16.17 KG/M2 | OXYGEN SATURATION: 96 % | DIASTOLIC BLOOD PRESSURE: 64 MMHG

## 2022-03-09 DIAGNOSIS — Z86.79 S/P ABLATION OF ATRIAL FLUTTER: ICD-10-CM

## 2022-03-09 DIAGNOSIS — E78.2 MIXED HYPERLIPIDEMIA: ICD-10-CM

## 2022-03-09 DIAGNOSIS — I48.0 PAROXYSMAL ATRIAL FIBRILLATION: Primary | ICD-10-CM

## 2022-03-09 DIAGNOSIS — I10 PRIMARY HYPERTENSION: ICD-10-CM

## 2022-03-09 DIAGNOSIS — Z95.0 CARDIAC PACEMAKER IN SITU: ICD-10-CM

## 2022-03-09 DIAGNOSIS — Z98.890 S/P ABLATION OF ATRIAL FLUTTER: ICD-10-CM

## 2022-03-09 PROCEDURE — 93010 EKG 12-LEAD: ICD-10-PCS | Mod: S$GLB,,, | Performed by: INTERNAL MEDICINE

## 2022-03-09 PROCEDURE — 93005 ELECTROCARDIOGRAM TRACING: CPT | Mod: S$GLB,,, | Performed by: INTERNAL MEDICINE

## 2022-03-09 PROCEDURE — 99214 PR OFFICE/OUTPT VISIT, EST, LEVL IV, 30-39 MIN: ICD-10-PCS | Mod: S$GLB,,, | Performed by: INTERNAL MEDICINE

## 2022-03-09 PROCEDURE — 93005 EKG 12-LEAD: ICD-10-PCS | Mod: S$GLB,,, | Performed by: INTERNAL MEDICINE

## 2022-03-09 PROCEDURE — 93010 ELECTROCARDIOGRAM REPORT: CPT | Mod: S$GLB,,, | Performed by: INTERNAL MEDICINE

## 2022-03-09 PROCEDURE — 99214 OFFICE O/P EST MOD 30 MIN: CPT | Mod: S$GLB,,, | Performed by: INTERNAL MEDICINE

## 2022-03-28 ENCOUNTER — PATIENT MESSAGE (OUTPATIENT)
Dept: FAMILY MEDICINE | Facility: CLINIC | Age: 77
End: 2022-03-28
Payer: MEDICARE

## 2022-03-28 DIAGNOSIS — Z86.73 H/O: CVA (CEREBROVASCULAR ACCIDENT): Primary | ICD-10-CM

## 2022-03-28 DIAGNOSIS — M47.896 OTHER SPONDYLOSIS, LUMBAR REGION: ICD-10-CM

## 2022-03-28 DIAGNOSIS — R29.6 FREQUENT FALLS: ICD-10-CM

## 2022-03-28 NOTE — TELEPHONE ENCOUNTER
Is he asking for home health physical therapy and occupational therapy?  It does not look like she has home health right now.  If he is talking about in office, does he have a preference for which physical therapy office?

## 2022-03-28 NOTE — TELEPHONE ENCOUNTER
Patient notified via e-mail due to this being initial point of contact from patient regarding this matter.

## 2022-03-30 PROCEDURE — G0180 PR HOME HEALTH MD CERTIFICATION: ICD-10-PCS | Mod: ,,, | Performed by: FAMILY MEDICINE

## 2022-03-30 PROCEDURE — G0180 MD CERTIFICATION HHA PATIENT: HCPCS | Mod: ,,, | Performed by: FAMILY MEDICINE

## 2022-04-04 ENCOUNTER — EXTERNAL HOME HEALTH (OUTPATIENT)
Dept: HOME HEALTH SERVICES | Facility: HOSPITAL | Age: 77
End: 2022-04-04
Payer: MEDICARE

## 2022-04-12 ENCOUNTER — CLINICAL SUPPORT (OUTPATIENT)
Dept: CARDIOLOGY | Facility: HOSPITAL | Age: 77
End: 2022-04-12
Payer: MEDICARE

## 2022-04-12 DIAGNOSIS — Z95.0 PRESENCE OF CARDIAC PACEMAKER: ICD-10-CM

## 2022-04-12 PROCEDURE — 93296 REM INTERROG EVL PM/IDS: CPT | Performed by: INTERNAL MEDICINE

## 2022-04-18 ENCOUNTER — PATIENT MESSAGE (OUTPATIENT)
Dept: CARDIOLOGY | Facility: CLINIC | Age: 77
End: 2022-04-18
Payer: MEDICARE

## 2022-04-19 DIAGNOSIS — I10 PRIMARY HYPERTENSION: Primary | ICD-10-CM

## 2022-04-19 DIAGNOSIS — F41.1 GAD (GENERALIZED ANXIETY DISORDER): ICD-10-CM

## 2022-04-19 DIAGNOSIS — I48.0 PAROXYSMAL ATRIAL FIBRILLATION: ICD-10-CM

## 2022-04-19 RX ORDER — METOPROLOL TARTRATE 25 MG/1
25 TABLET, FILM COATED ORAL 2 TIMES DAILY
Qty: 180 TABLET | Refills: 2 | Status: SHIPPED | OUTPATIENT
Start: 2022-04-19 | End: 2023-01-03

## 2022-04-26 ENCOUNTER — DOCUMENT SCAN (OUTPATIENT)
Dept: HOME HEALTH SERVICES | Facility: HOSPITAL | Age: 77
End: 2022-04-26
Payer: MEDICARE

## 2022-04-26 ENCOUNTER — TELEPHONE (OUTPATIENT)
Dept: FAMILY MEDICINE | Facility: CLINIC | Age: 77
End: 2022-04-26
Payer: MEDICARE

## 2022-04-26 NOTE — TELEPHONE ENCOUNTER
[facility] wants patient on appetite stimulant- spoke to - patient does not have a problem sleeping. You were thinking of prescribing Remeron.

## 2022-04-27 RX ORDER — MIRTAZAPINE 7.5 MG/1
7.5 TABLET, FILM COATED ORAL NIGHTLY
Qty: 30 TABLET | Refills: 5 | Status: SHIPPED | OUTPATIENT
Start: 2022-04-27 | End: 2022-08-19 | Stop reason: ALTCHOICE

## 2022-04-27 NOTE — TELEPHONE ENCOUNTER
Yes, we can still use the Remeron as long as she is not hung over from at the next day.  Let us start with a 7.5 mg Remeron at bedtime and we can increase it if needed and if tolerated.  I cannot find earlier messages anywhere in [employer].  What pharmacy?

## 2022-04-27 NOTE — TELEPHONE ENCOUNTER
Patient's  (Jan) notified. Verbalized understanding. Mr. Montoya states preferred pharmacy is [address].

## 2022-05-09 ENCOUNTER — PATIENT MESSAGE (OUTPATIENT)
Dept: FAMILY MEDICINE | Facility: CLINIC | Age: 77
End: 2022-05-09
Payer: MEDICARE

## 2022-05-09 NOTE — TELEPHONE ENCOUNTER
Sent the patient a portal message regarding her prescription having refills at the pharmacy waiting on her, I called [facility] to verify

## 2022-05-10 ENCOUNTER — PATIENT MESSAGE (OUTPATIENT)
Dept: OBSTETRICS AND GYNECOLOGY | Facility: CLINIC | Age: 77
End: 2022-05-10
Payer: MEDICARE

## 2022-05-13 ENCOUNTER — PES CALL (OUTPATIENT)
Dept: ADMINISTRATIVE | Facility: CLINIC | Age: 77
End: 2022-05-13
Payer: MEDICARE

## 2022-05-23 NOTE — DISCHARGE INSTRUCTIONS
Having Ankle Fracture Open Reduction and Internal Fixation (ORIF)  Open reduction and internal fixation (ORIF) is a type of treatment to fix a broken bone. It puts the pieces of a broken bone back together so they can heal. Open reduction means the bones are put back in place during a surgery. Internal fixation means that special hardware is used to hold the bone pieces together. This helps the bone heals correctly. The procedure is done by an orthopedic surgeon. This is a doctor with special training in treating bone, joint, and muscle problems.  What to tell your healthcare provider  Make sure you tell the healthcare provider about all medicines you take, including over-the-counter medicines, such as aspirin. Tell him or her about all vitamins, herbs, and other supplements you take. Also tell the provider the last time you had something to eat or drink. And tell your provider if you:  · Have had any recent changes in your health, such as an infection or fever  · Are sensitive or allergic to any medicines, latex, tape, or anesthetic medicines (local and general)  · Are pregnant or think you may be pregnant  Tests before your surgery  You may have an X-ray or a CT scan to look at your tibia and fibula.  Getting ready for your surgery  ORIF often takes place as emergency surgery after an accident or injury. Before this procedure, a healthcare provider will ask about your health history and give you a physical exam.  In some cases, ankle fracture ORIF is planned. Your surgery may be done after the swelling in your ankle has gone down. You might need to have your ankle held in place while you wait for your surgery. Talk with your healthcare provider how to get ready for your surgery. You may need to stop taking some medicines before the procedure, such as blood thinners and aspirin. If you smoke, you may need to stop before your surgery. Smoking can delay healing. Talk with your healthcare provider if you need  Chief complaint:   Chief Complaint   Patient presents with   • Heart Problem       Vitals:  Visit Vitals  /74   Pulse 77   Resp 14   Ht 5' 7\" (1.702 m)   Wt 65.8 kg (145 lb)   LMP 04/13/2009   SpO2 97%   BMI 22.71 kg/m²       HISTORY OF PRESENT ILLNESS     HPI     Problem List    1. Sinus Tachycardia  2. Dyspnea on exertion  3. Hyperlipidemia  4. Reactive Airway Disease  5. Anxiety  6. Current Tobacco User      Nehal Park a 59 year old  female patient, born 1962, Medical record Number: [id number], was seen in the [facility] at 3:13 PM, on 5/27/2022 for a cardiology follow up visit.     Nehal Park initially presented with referral from urgent care due to sinus tachycardia.  End of September patient was treated for reactive airway disease, sinus tachycardia, and COVID-19 test was negative at this time.  Patient still continues to have a racing heart and dyspnea on exertion.  COVID-19 test was re-ordered but patient did not obtain test at this time.  Patient did have Echo Stress done 2/11/2019 was normal.      At visit on 11/09/2020, the patient reports occasional palpitations. She states that it feels like a pounding sensation. The patient admits to be using tobacco but trying to stop. She states to smoke about 4-5 cigarettes a day. Denied any other cardiac complaints.     At last visit on 05/28/2021, the patient reports feeling well.  Is still currently smoking.  Drinks wine occasionally.  Denied any cardiac complaints.    Echo 11/23/2021 showed no significant change from previous in 2019.    Today, patient reports she is feeling well.  Patient reports she is working on quitting smoking.  States she has some allergies right now. Denies Chest pain, SOB, Dizziness, Palpitations, Syncope, PND, Orthopnea, Ankle Edema, Claudication and Fatigue        Nehal Park  has a past medical history of Anxiety and depression (5/1/2017), Depressive disorder, not elsewhere classified,  Diffuse cystic mastopathy, Female infertility of unspecified origin, GERD without esophagitis, Migraine, unspecified, without mention of intractable migraine without mention of status migrainosus, PONV (postoperative nausea and vomiting), Shingles, and Varicella without mention of complication.    She has no past medical history of Malignant hyperthermia or Sleep apnea.    Other significant problems:  Patient Active Problem List    Diagnosis Date Noted   • Ex-smoker 12/02/2021     Priority: Low     quit october 2021     • History of ischemic colitis 11/09/2021     Priority: Low   • Gastroesophageal reflux disease without esophagitis 09/01/2021     Priority: Low   • Hypovitaminosis D 09/01/2021     Priority: Low   • Hyperlipidemia 09/01/2021     Priority: Low   • Anxiety and depression 05/01/2017     Priority: Low   • Other malaise and fatigue 05/26/2003     Priority: Low   • Migraine, unspecified, without mention of intractable migraine without mention of status migrainosus 05/26/2003     Priority: Low     PAST MEDICAL, FAMILY AND SOCIAL HISTORY     Medications:  Current Outpatient Medications   Medication   • fluticasone (FLONASE) 50 MCG/ACT nasal spray   • doxycycline monohydrate (MONODOX) 100 MG capsule   • omeprazole (PriLOSEC) 40 MG capsule   • famotidine (PEPCID) 20 MG tablet   • rosuvastatin (CRESTOR) 10 MG tablet   • Coenzyme Q10 200 MG Cap   • metoPROLOL succinate (TOPROL-XL) 25 MG 24 hr tablet   • acetaminophen (TYLENOL) 500 MG tablet   • Cholecalciferol (VITAMIN D3) 2000 units Tab   • Multiple Vitamins-Minerals (MULTI FOR HER 50+) tablet     No current facility-administered medications for this visit.     Allergies:  ALLERGIES:   Allergen Reactions   • Aspartame HEADACHES     migraines with nutrasweet   • Celebrex [Celecoxib] NAUSEA, DIARRHEA and CONSTIPATION   • Meloxicam NAUSEA, DIARRHEA and CONSTIPATION   • Monosodium Glutamate   (Food Or Med) GI UPSET, DIZZINESS and HEADACHES   • Wine [Alcohol   (Food  help to stop smoking.  Also, make sure to:  · Ask a family member or friend to take you home from the hospital. You cannot drive yourself.  · Plan some changes at home to help you recover. You may need help at home.  · Not eat or drink after midnight the night before your surgery.  · Follow all other instructions from your healthcare provider.  You may be asked to sign a consent form that gives your permission to do the procedure. Read the form carefully. Ask questions if something is not clear.  On the day of surgery  Your surgeon will explain the details of your surgery. These details will depend on where your injury is and how serious it is. An orthopedic surgeon and a team of specialized nurses will do the surgery. The preparation and surgery may take a couple of hours. In general, you can expect the following:  · You will likely have general anesthesia.This is medicine to prevent pain and make you sleep through the surgery. Or you may have regional anesthesia to numb the area and medicine to help you relax and sleep through the surgery.  · A healthcare provider watches your vital signs, like your heart rate and blood pressure, during the surgery.  · After cleaning the skin, your surgeon will make a cut (incision) through the skin and muscle of your ankle.  · The surgeon will put the pieces of your ankle bones back into alignment (reduction).  · The pieces of the broken bones will be secured to each other (fixation). Your doctor may use screws, metal plates, wires, or pins.  · Other repairs are made to the area as needed.  · The layers of muscle and skin around your ankle will be closed with stitches (sutures).  After your surgery  Talk with your surgeon about what you can expect after your surgery. You may go home the same day. Or you may stay overnight in the hospital. Before leaving the hospital, you will likely have X-rays taken of your ankle. This is to check the repair.  You will have some pain after the  Or Med)] HEADACHES     Migraines red alber   • Imidazole Antifungals Other (See Comments)     flagyl, bumps on tongue   • Seasonal Other (See Comments)     Spring/Fall     Past Medical  History/Surgeries:  Past Medical History:   Diagnosis Date   • Anxiety and depression 5/1/2017   • Depressive disorder, not elsewhere classified     Depression   • Diffuse cystic mastopathy     Fibrocystic Breast Disease   • Female infertility of unspecified origin    • GERD without esophagitis    • Migraine, unspecified, without mention of intractable migraine without mention of status migrainosus     Migraine   • PONV (postoperative nausea and vomiting)    • Shingles    • Varicella without mention of complication     Childhood     Past Surgical History:   Procedure Laterality Date   • Appendectomy     • Carpal tunnel release  02/04/2013    Rt CTR    • Colonoscopy diagnostic  9/7/16    Dr. Briceno, Diverticulosis/Hemorrhoids, F/U 10 years   • Colonoscopy w biopsy  5/17/06    Dr. Briceno, Ischemic Colitis, F/U 5-7 years   • Colonoscopy w biopsy  01/26/2021    Dr. Briceno, Adenoma & Benign Polyps/Hemorrhoids/Diverticulosis, F/U 5 years   • D and c      D&C   • Esophagogastroduodenoscopy transoral flex w/bx single or mult  01/26/2021    Dr. Briceno, Benign Small Bowel/Mild Gastritis/Neg Hpylori   • Forearm/wrist surgery unlisted  08/20/2012    Lt deq rels, ECU tendon decompression & Cortisone inj Lt rad tunnel    • Removal of ovarian cyst(s)      Ovarian Cystectomy     Family History:  Family History   Problem Relation Age of Onset   • Pneumonia Mother 78   • Heart Mother    • Aneurysm Father 75   • Patient is unaware of any medical problems Sister    • Patient is unaware of any medical problems Maternal Grandmother    • Patient is unaware of any medical problems Maternal Grandfather    • Patient is unaware of any medical problems Paternal Grandmother    • Patient is unaware of any medical problems Paternal Grandfather    • Diabetes Other       surgery. Your doctor will tell you what pain medicine you can take to help reduce the pain. Avoid certain over-the-counter medicines for pain as instructed. Some of these may interfere with bone healing. You can also use ice packs to help lessen pain and swelling.  You may be told to keep your ankle elevated for a period of time after your surgery. Youll also need to not move your ankle for a while. Often, this means wearing a brace, perhaps for several weeks. Youll get instructions about how to move your leg and when you can put weight on it. Your surgeon may also tell you to eat foods high in calcium and vitamin D to help with bone healing. You may need to take medicine to prevent blood clots (blood thinner) for a little while after your surgery. Follow all your doctors instructions carefully.  Follow-up care  Make sure to keep all of your follow-up appointments. You may need to have your stitches removed a week or so after your surgery.  You may have physical therapy to improve the strength and movement of your ankle. The therapy may include treatments and exercises. The therapy improves your chances of a full recovery. Most people are able to return to all their normal activities within a few months.     When to call your healthcare provider  Call your healthcare provider right away if you have any of these:  · Fever of 100.4°F (38°C) or higher  · Redness, swelling, or fluid leaking from your incision that gets worse  · Pain that gets worse  · Loss of feeling in your foot or leg           Discharge Instructions: After Your Surgery/Procedure  Youve just had surgery. During surgery you were given medicine called anesthesia to keep you relaxed and free of pain. After surgery you may have some pain or nausea. This is common. Here are some tips for feeling better and getting well after surgery.     Stay on schedule with your medication.   Going home  Your doctor or nurse will show you how to take care of yourself     Grandmother has diabetes   • Diabetes Other         Uncle has diabetes   • Heart Other         CAD in a grandmother in her 80's   • Patient is unaware of any medical problems Daughter      Social History:  Social History     Tobacco Use   • Smoking status: Former Smoker     Packs/day: 0.25     Years: 47.00     Pack years: 11.75     Types: Cigarettes     Start date: 10/1/1972     Quit date: 2021     Years since quittin.5   • Smokeless tobacco: Never Used   Substance Use Topics   • Alcohol use: Yes     Alcohol/week: 1.0 - 2.0 standard drink     Types: 1 - 2 Glasses of wine per week     Comment: occ     REVIEW OF SYSTEMS     Review of Systems   Constitutional: Negative for activity change, appetite change, fatigue and unexpected weight change.   Respiratory: Negative for apnea, chest tightness and shortness of breath.    Cardiovascular: Negative for chest pain, palpitations and leg swelling.   Musculoskeletal: Negative for myalgias.   Skin: Negative for pallor.   Neurological: Negative for dizziness, syncope, light-headedness and numbness.   All other systems reviewed and are negative.      PHYSICAL EXAM     Physical Exam  HENT:      Head: Normocephalic.      Neck: Neck supple.   Cardiovascular:      Rate and Rhythm: Normal rate and regular rhythm.      Heart sounds: Normal heart sounds. No murmur heard.    No friction rub. No gallop.   Pulmonary:      Effort: Pulmonary effort is normal. No respiratory distress.      Breath sounds: Normal breath sounds. No wheezing or rales.   Chest:      Chest wall: No tenderness.   Abdominal:      Palpations: Abdomen is soft.   Musculoskeletal:         General: Normal range of motion.   Skin:     General: Skin is warm.   Neurological:      Mental Status: She is alert and oriented to person, place, and time.         Echo 2021  1. Normal left ventricular size and systolic function, LVEF 65%. No regional wall motion abnormalities. Normal LV global longitudinal  strain,  "when you go home. He or she will also answer your questions. Have an adult family member or friend drive you home.      For your safety we recommend these precaution for the first 24 hours after your procedure:  · Do not drive or use heavy equipment.  · Do not make important decisions or sign legal papers.  · Do not drink alcohol.  · Have someone stay with you, if needed. He or she can watch for problems and help keep you safe.  · Your concentration, balance, coordination, and judgement may be impaired for many hours after anesthesia.  Use caution when ambulating or standing up.     · You may feel weak and "washed out" after anesthesia and surgery.      Subtle residual effects of general anesthesia or sedation with regional / local anesthesia can last more than 24 hours.  Rest for the remainder of the day or longer if your Doctor/Surgeon has advised you to do so.  Although you may feel normal within the first 24 hours, your reflexes and mental ability may be impaired without you realizing it.  You may feel dizzy, lightheaded or sleepy for 24 hours or longer.      Be sure to go to all follow-up visits with your doctor. And rest after your surgery for as long as your doctor tells you to.  Coping with pain  If you have pain after surgery, pain medicine will help you feel better. Take it as told, before pain becomes severe. Also, ask your doctor or pharmacist about other ways to control pain. This might be with heat, ice, or relaxation. And follow any other instructions your surgeon or nurse gives you.  Tips for taking pain medicine  To get the best relief possible, remember these points:  · Pain medicines can upset your stomach. Taking them with a little food may help.  · Most pain relievers taken by mouth need at least 20 to 30 minutes to start to work.  · Taking medicine on a schedule can help you remember to take it. Try to time your medicine so that you can take it before starting an activity. This might be before " 20%.  2. Normal right ventricular size and systolic function.  3. Mild left atrial enlargement.  4. Mild tricuspid and pulmonic regurgitation.  Compared to the resting images from the stress Echo performed on 2/11/2019, there are no significant changes.    CT Cardiac Calcium Score 11/12/2021  IMPRESSION:  *  Total coronary artery calcium score is 0. 0% of people matched for age, gender, and race/ethnicity who are free of clinical cardiovascular disease and treated diabetes had less calcium than was detected in this study.   *  Incidental benign-appearing 2 to 3 mm nodule along the right major  fissure. Please refer to the Fleischner Society guidelines detailed above.    EKG 10/26/2020      CXR 9/23/2020  FINDINGS: The cardiac silhouette and vascularity are within normal limits.    Echo Stress Test 2/11/2019  Left ventricular ejection fraction normal at baseline. Estimated left ventricular ejection fraction 65 %. Left ventricular ejection  fraction increased with stress. Normal LV regional wall motion at baseline. No LV regional wall motion abnormalities with stress. LV  systolic volume decreased with stress. Definity contrast was utilized to better visualize the endocardial definition.  No echocardiographic evidence of myocardial ischemia.  No evidence of myocardial ischemia with stress.  Normal stress echocardiogram.    ASSESSMENT/PLAN     Recent Labs   Lab 11/03/21  0822 09/01/21  1123   HGB  --  13.5   BUN  --  13   Creatinine  --  0.87   Hemoglobin A1C  --  5.7*   Potassium  --  4.3   GOT/AST 20 19   GPT 33 29   LDL Direct 67 190*   TSH  --  2.758       LDL (mg/dL)   Date Value   11/13/2020 205 (H)       ASSESSMENT AND PLAN:    Nehal Park presents with the following active problems:    PROBLEMS:    1. Sinus Tachycardia  2. Dyspnea on exertion  3. Hyperlpidemia  4. Reactive Airway Disease  5. Anxiety  6. Current Tobacco User    RECOMMENDATIONS:    Sinus Tachycardia  Echo Stress Test done 2/11/2019 -  you get dressed, go for a walk, or sit down for dinner.  · Constipation is a common side effect of pain medicines. Call your doctor before taking any medicines such as laxatives or stool softeners to help ease constipation. Also ask if you should skip any foods. Drinking lots of fluids and eating foods such as fruits and vegetables that are high in fiber can also help. Remember, do not take laxatives unless your surgeon has prescribed them.  · Drinking alcohol and taking pain medicine can cause dizziness and slow your breathing. It can even be deadly. Do not drink alcohol while taking pain medicine.  · Pain medicine can make you react more slowly to things. Do not drive or run machinery while taking pain medicine.  Your health care provider may tell you to take acetaminophen to help ease your pain. Ask him or her how much you are supposed to take each day. Acetaminophen or other pain relievers may interact with your prescription medicines or other over-the-counter (OTC) drugs. Some prescription medicines have acetaminophen and other ingredients. Using both prescription and OTC acetaminophen for pain can cause you to overdose. Read the labels on your OTC medicines with care. This will help you to clearly know the list of ingredients, how much to take, and any warnings. It may also help you not take too much acetaminophen. If you have questions or do not understand the information, ask your pharmacist or health care provider to explain it to you before you take the OTC medicine.  Managing nausea  Some people have an upset stomach after surgery. This is often because of anesthesia, pain, or pain medicine, or the stress of surgery. These tips will help you handle nausea and eat healthy foods as you get better. If you were on a special food plan before surgery, ask your doctor if you should follow it while you get better. These tips may help:  · Do not push yourself to eat. Your body will tell you when to eat and how  Normal EF 65%  Metroprolol 25 mg once a day  Patient denies any palpitaitons      Dyspnea on exertion  Echo Stress Test done 2/11/2019 - Normal EF 65%  Denies any dyspnea on exertion      Hyperlipidemia  LDL (mg/dL)   Date Value   11/13/2020 205 (H)   Crestor 10 mg daily,  Increase crestor to 20 MG and add Zetia 10 MG  Recheck FLP and LFT's      FOLLOW UP: Nehal Park is expected to follow up in 12 months.        On 5/27/2022, IFERNANDO RN scribed the services personally performed by Sam Esparza MD    The documentation recorded by the scribe accurately and completely reflects the service(s) I personally performed and the decisions made by me.      This entire documentation recorded accurately and completely reflects the service(s) performed and clinical decisions made. I attest that I have personally formulated the clinical plan, reviewed, edited the note, and remain entirely responsible for its content.     Summary:  Nehal Park is a 59 year old female presenting with the above listed problems.  No other new concerning clinical signs or symptoms. Nehal Park remains clinically stable. Today's exam finding are noted. Reviewed medications, last ejection fraction,(65 %), low-density lipoprotein,(205 mg/dL) serum creatinine,(0.87 mg/dL) and other relevant tests. Advised this patient to continue current medications.  Encouraged compliance with prescribed therapies and lifestyle changes. Nehal Neumanns  health concerns and questions were addressed. Future recommendations and any required tests were explained, and cardiology were appointments scheduled. Suggested follow up in 12 months, earlier if needed.       Sam Esparza MD, FACC, FACP, FSCAI.  Interventional Cardiology  [facility] [place]  Pager: [phone]  [email or web address]  4:39 PM, 5/27/2022  [facility]: [phone]   [facility]: [phone]  [facility]  much.  · Start off with clear liquids and soup. They are easier to digest.  · Next try semi-solid foods, such as mashed potatoes, applesauce, and gelatin, as you feel ready.  · Slowly move to solid foods. Dont eat fatty, rich, or spicy foods at first.  · Do not force yourself to have 3 large meals a day. Instead eat smaller amounts more often.  · Take pain medicines with a small amount of solid food, such as crackers or toast, to avoid nausea.     Call your surgeon if  · You still have pain an hour after taking medicine. The medicine may not be strong enough.  · You feel too sleepy, dizzy, or groggy. The medicine may be too strong.  · You have side effects like nausea, vomiting, or skin changes, such as rash, itching, or hives.       If you have obstructive sleep apnea  You were given anesthesia medicine during surgery to keep you comfortable and free of pain. After surgery, you may have more apnea spells because of this medicine and other medicines you were given. The spells may last longer than usual.   At home:  · Keep using the continuous positive airway pressure (CPAP) device when you sleep. Unless your health care provider tells you not to, use it when you sleep, day or night. CPAP is a common device used to treat obstructive sleep apnea.  · Talk with your provider before taking any pain medicine, muscle relaxants, or sedatives. Your provider will tell you about the possible dangers of taking these medicines.  © 7964-5285 [employer]. [address] [place] [address], [place] [address]. All rights reserved. This information is not intended as a substitute for professional medical care. Always follow your healthcare professional's instructions.    General Information:    1.  Do not drink alcoholic beverages including beer for 24 hours or as long as you are on pain medication..  2.  Do not drive a motor vehicle, operate machinery or power tools, or signs legal papers for 24 hours or as long as you are on  [facility]: [phone]  [email or web address]                         pain medication.   3.  You may experience light-headedness, dizziness, and sleepiness following surgery. Please do not stay alone. A responsible adult should be with you for this 24 hour period.  4.  Go home and rest.    5. Progress slowly to a normal diet unless instructed.  Otherwise, begin with liquids such as soft drinks, then soup and crackers working up to solid foods. Drink plenty of nonalcoholic fluids.  6.  Certain anesthetics and pain medications produce nausea and vomiting in certain       individuals. If nausea becomes a problem at home, call you doctor.    7. A nurse will be calling you sometime after surgery. Do not be alarmed. This is our way of finding out how you are doing.    8. Several times every hour while you are awake, take 2-3 deep breaths and cough. If you had stomach surgery hold a pillow or rolled towel firmly against your stomach before you cough. This will help with any pain the cough might cause.  9. Several times every hour while you are awake, pump and flex your feet 5-6 times and do foot circles. This will help prevent blood clots.    10.Call your doctor for severe pain, bleeding, fever, or signs or symptoms of infection (pain, swelling, redness, foul odor, drainage).  Post op instructions for prevention of DVT  What is deep vein thrombosis?  Deep vein thrombosis (DVT) is the medical term for blood clots in the deep veins of the leg.  These blood clots can be dangerous.  A DVT can block a blood vessel and keep blood from getting where it needs to go.  Another problem is that the clot can travel to other parts of the body such as the lungs.  A clot that travels to the lungs is called a pulmonary embolus (PE) and can cause serious problems with breathing which can lead to death.  Am I at risk for DVT/PE?  If you are not very active, you are at risk of DVT.  Anyone confined to bed, sitting for long periods of time, recovering from surgery, etc. increases the risk of DVT.  Other risk  factors are cancer diagnosis, certain medications, estrogen replacement in any form,older age, obesity, pregnancy, smoking, history of clotting disorders, and dehydration.  How will I know if I have a DVT?   Swelling in the lower leg   Pain   Warmth, redness, hardness or bulging of the vein  If you have any of these symptoms, call your doctors office right away.  Some people will not have any symptoms until the clot moves to the lungs.  What are the symptoms of a PE?   Panting, shortness of breath, or trouble breathing   Sharp, knife-like chest pain when you breathe   Coughing or coughing up blood   Rapid heartbeat  If you have any of these symptoms or get worse quickly, call 911 for emergency treatment.  How can I prevent a DVT?   Avoid long periods of inactivity and dont cross your legs--get up and walk around every hour or so.   Stay active--walking after surgery is highly encouraged.  This means you should get out of the house and walk in the neighborhood.  Going up and down stairs will not impair healing (unless advised against such activity by your doctor).     Drink plenty of noncaffeinated, nonalcoholic fluids each day to prevent dehydration.   Wear special support stockings, if they have been advised by your doctor.   If you travel, stop at least once an hour and walk around.   Avoid smoking (assistance with stopping is available through your healthcare provider)  Always notify your doctor if you are not able to follow the post operative instructions that are given to you at the time of discharge.  It may be necessary to prescribe one of the medications available to prevent DVT.    Using Opioids for Pain Management     Your doctor has given instructions for you to take an opioid.  This is a drug for bad pain.  It helps control pain without causing bleeding and kidney problems.  Common opioid names are morphine, hydromorphone, oxycodone, and methadone. These drugs are called  narcotics.    There are several safety concerns you need to know.     · It is against the law to give or sell this drug to another person.  You must keep this medicine safely locked.    · You may have side effects from taking this medication.  These include nausea, itching, sweating, sleepiness, a change in your ability to breathe, and depression.  · Do not take alcohol or sleeping pills opioids.    · Long-term opoid use may no longer giver you relief from pain.  It can cause you stomach pain, mental anxiety, and headaches.  Long-term opoid use can potentially lead to unlawful street drug abuse and reduce your ability to stay employed.    · Your body may become opioid tolerant if you need to take more to get relief.    · You must stop taking opioids if you begin having more pain as a result of the medicine.    · Opioid withdrawal occurs when you have to stop taking the drug.  It can cause you to have nausea, vomiting, diarrhea, stomach pain, anxiety, and dilated pupils in your eyes. This condition means you are opioid dependent.    · Addiction is a drug induced brain disease. It means there are changes in how your brain is working.  Children, teens, and young adults under 25 years old are more likely to get addicted to opioids.      · Addiction can happen with repeated opioid use.  It does not happen with short-term use of two weeks or less.       For more information, please speak with your doctor or pharmacist.          We hope your stay was comfortable as you heal now, mend and rest.    For we have enjoyed taking care of you by giving your our best.    And as you get better, by regaining your health and strength;   We count it as a privilege to have served you and hope your time at [facility] was well spent.      Thank  You!!!

## 2022-05-26 ENCOUNTER — PES CALL (OUTPATIENT)
Dept: ADMINISTRATIVE | Facility: CLINIC | Age: 77
End: 2022-05-26
Payer: MEDICARE

## 2022-05-29 PROCEDURE — G0180 MD CERTIFICATION HHA PATIENT: HCPCS | Mod: ,,, | Performed by: FAMILY MEDICINE

## 2022-05-29 PROCEDURE — G0180 PR HOME HEALTH MD CERTIFICATION: ICD-10-PCS | Mod: ,,, | Performed by: FAMILY MEDICINE

## 2022-06-07 ENCOUNTER — OFFICE VISIT (OUTPATIENT)
Dept: OBSTETRICS AND GYNECOLOGY | Facility: CLINIC | Age: 77
End: 2022-06-07
Payer: MEDICARE

## 2022-06-07 ENCOUNTER — HOSPITAL ENCOUNTER (OUTPATIENT)
Dept: RADIOLOGY | Facility: HOSPITAL | Age: 77
Discharge: HOME | End: 2022-06-07
Attending: OBSTETRICS & GYNECOLOGY
Payer: MEDICARE

## 2022-06-07 VITALS
DIASTOLIC BLOOD PRESSURE: 68 MMHG | BODY MASS INDEX: 15.71 KG/M2 | HEIGHT: 67 IN | WEIGHT: 100.06 LBS | SYSTOLIC BLOOD PRESSURE: 118 MMHG

## 2022-06-07 DIAGNOSIS — Z12.31 VISIT FOR SCREENING MAMMOGRAM: ICD-10-CM

## 2022-06-07 DIAGNOSIS — Z12.31 VISIT FOR SCREENING MAMMOGRAM: Primary | ICD-10-CM

## 2022-06-07 DIAGNOSIS — Z01.419 ENCOUNTER FOR ANNUAL ROUTINE GYNECOLOGICAL EXAMINATION: ICD-10-CM

## 2022-06-07 PROCEDURE — 99999 PR PBB SHADOW E&M-EST. PATIENT-LVL III: CPT | Mod: PBBFAC,,, | Performed by: OBSTETRICS & GYNECOLOGY

## 2022-06-07 PROCEDURE — G0101 PR CA SCREEN;PELVIC/BREAST EXAM: ICD-10-PCS | Mod: S$GLB,,, | Performed by: OBSTETRICS & GYNECOLOGY

## 2022-06-07 PROCEDURE — 77067 MAMMO DIGITAL SCREENING BILAT WITH TOMO: ICD-10-PCS | Mod: 26,,, | Performed by: RADIOLOGY

## 2022-06-07 PROCEDURE — 77067 SCR MAMMO BI INCL CAD: CPT | Mod: 26,,, | Performed by: RADIOLOGY

## 2022-06-07 PROCEDURE — G0101 CA SCREEN;PELVIC/BREAST EXAM: HCPCS | Mod: S$GLB,,, | Performed by: OBSTETRICS & GYNECOLOGY

## 2022-06-07 PROCEDURE — 77063 BREAST TOMOSYNTHESIS BI: CPT | Mod: TC,PN

## 2022-06-07 PROCEDURE — 99999 PR PBB SHADOW E&M-EST. PATIENT-LVL III: ICD-10-PCS | Mod: PBBFAC,,, | Performed by: OBSTETRICS & GYNECOLOGY

## 2022-06-07 PROCEDURE — 77067 SCR MAMMO BI INCL CAD: CPT | Mod: TC,PN

## 2022-06-07 PROCEDURE — 77063 MAMMO DIGITAL SCREENING BILAT WITH TOMO: ICD-10-PCS | Mod: 26,,, | Performed by: RADIOLOGY

## 2022-06-07 PROCEDURE — 77063 BREAST TOMOSYNTHESIS BI: CPT | Mod: 26,,, | Performed by: RADIOLOGY

## 2022-06-07 PROCEDURE — 88175 CYTOPATH C/V AUTO FLUID REDO: CPT | Performed by: OBSTETRICS & GYNECOLOGY

## 2022-06-07 NOTE — PROGRESS NOTES
Chief Complaint   Patient presents with    Well Woman       History and Physical:  No LMP recorded. Patient is postmenopausal.       Ayla Dela Cruz is a 77 y.o.   female who presents today for her routine annual GYN exam. The patient has no Gynecology complaints today. No bowel or bladder complaints.       Allergies:   Review of patient's allergies indicates:   Allergen Reactions    Gabapentin Hallucinations    Xarelto [rivaroxaban] Rash    Bactrim [sulfamethoxazole-trimethoprim] Other (See Comments)     Upset stomach, dry heaves, confusion    Afrin (pseudoephedrine)     Amoxicillin-pot clavulanate      Other reaction(s): Mental Status Change    Atorvastatin      Other reaction(s): Joint pain    Baclofen     Baclofen (bulk) Nausea And Vomiting    Ciprofloxacin     Ciprofloxacin (bulk)     Decongest tabs      Other reaction(s): increased heart rate    Decongestant [pseudoephedrine hcl]     Erythromycin Other (See Comments)    Flecainide Hives     And SOB. No reaction to Lidocaine     Fluoxetine      Other reaction(s): heart palpitations  Other reaction(s): anxiety    Lisinopril Other (See Comments)     cough    Losartan Other (See Comments)     Hypotension with lightheadedness    Morphine Other (See Comments)     confusion    Tramadol Other (See Comments)     SOB, low BP    Venlafaxine     Venlafaxine analogues      Changes in BP and increases heart rate       Afrin [oxymetazoline] Palpitations    Caffeine Palpitations    Dabigatran etexilate Rash    Tizanidine Anxiety     dizziness       Past Medical History:   Diagnosis Date    Anticoagulant long-term use     Anxiety     Arthritis     Atrial fibrillation     Cancer     skin    CHF (congestive heart failure)     Depression     DVT (deep venous thrombosis)     GERD (gastroesophageal reflux disease)     Glaucoma (increased eye pressure)     Hyperlipidemia     diet controlled    Hypertension      Interstitial lung disease     Localized hives 1/10/2020    Mild persistent asthma without complication 11/12/2018    Pneumonia 1/31/2014    Stroke 6-3-14    Stroke     TIA (transient ischemic attack)     TIA (transient ischemic attack)        Past Surgical History:   Procedure Laterality Date    2 heart ablations      3 in total    A-V CARDIAC PACEMAKER INSERTION Left 10/14/2021    Procedure: INSERTION, CARDIAC PACEMAKER, DUAL CHAMBER;  Surgeon: Fco Calderon MD;  Location: [facility] EP LAB;  Service: Cardiology;  Laterality: Left;  PAF/ [place] Arrthymias, Dual PPM, SJM, MAC, GP, 3 PREP    bilateral cataracts      CHOLECYSTECTOMY      COLONOSCOPY N/A 1/19/2017    Procedure: COLONOSCOPY and EGD;  Surgeon: Jonathan Schultz MD;  Location: [facility] ENDO;  Service: Endoscopy;  Laterality: N/A;    COLONOSCOPY N/A 10/10/2019    Procedure: COLONOSCOPY;  Surgeon: Alex Christian MD;  Location: [facility] ENDO;  Service: Endoscopy;  Laterality: N/A;    ESOPHAGOGASTRODUODENOSCOPY N/A 10/14/2019    Procedure: EGD (ESOPHAGOGASTRODUODENOSCOPY);  Surgeon: Alex Christian MD;  Location: [facility] ENDO;  Service: Endoscopy;  Laterality: N/A;    INJECTION OF ANESTHETIC AGENT AROUND MEDIAL BRANCH NERVES INNERVATING CERVICAL FACET JOINT Right 6/7/2018    Procedure: BLOCK, NERVE, FACET JOINT, MEDIAL BRANCH, CERVICAL;  Surgeon: Galo Clancy MD;  Location: [facility];  Service: Pain Management;  Laterality: Right;  C4, 5, 6    OPEN REDUCTION AND INTERNAL FIXATION (ORIF) OF INJURY OF ANKLE Right 11/1/2018    Procedure: ORIF, ANKLE;  Surgeon: Wilfredo Aguero MD;  [facility];  Service: Orthopedics;  Laterality: Right;    pyloristenosis      RADIOFREQUENCY ABLATION OF LUMBAR MEDIAL BRANCH NERVE AT SINGLE LEVEL Bilateral 9/21/2018    Procedure: RADIOFREQUENCY ABLATION, NERVE, SPINAL, LUMBAR, MEDIAL BRANCH, 1 LEVEL;  Surgeon: Galo Clancy MD;  Location: [facility];  Service: Pain Management;  Laterality: Bilateral;  L3, 4, 5     RADIOFREQUENCY ABLATION OF LUMBAR MEDIAL BRANCH NERVE AT SINGLE LEVEL Bilateral 2/19/2019    Procedure: Radiofrequency Ablation, Nerve, Spinal, Lumbar, Medial Branch, 1 Level;  Surgeon: Galo Clancy MD;  Location: [facility] OR;  Service: Pain Management;  Laterality: Bilateral;  L3, 4, 5     RADIOFREQUENCY ABLATION OF LUMBAR MEDIAL BRANCH NERVE AT SINGLE LEVEL Bilateral 3/10/2020    Procedure: Radiofrequency Ablation, Nerve, Spinal, Lumbar, Medial Branch, 1 Level;  Surgeon: Galo Clancy MD;  Location: [facility] OR;  Service: Pain Management;  Laterality: Bilateral;  L3,4,5 - Burned at 80 degrees C. for 60 seconds x 2 each site      RADIOFREQUENCY ABLATION OF LUMBAR MEDIAL BRANCH NERVE AT SINGLE LEVEL Bilateral 9/11/2020    Procedure: Radiofrequency Ablation, Nerve, Spinal, Lumbar, Medial Branch, 1 Level;  Surgeon: Galo Clancy MD;  Location: [facility] OR;  Service: Pain Management;  Laterality: Bilateral;  L3, 4, 5 - Burned at 80 degrees C. for 60 seconds x 2 each site    RADIOFREQUENCY ABLATION OF LUMBAR MEDIAL BRANCH NERVE AT SINGLE LEVEL Bilateral 5/28/2021    Procedure: Radiofrequency Ablation, Nerve, Spinal, Lumbar, Medial Branch, 1 Level;  Surgeon: Galo Clancy MD;  Location: [facility] OR;  Service: Pain Management;  Laterality: Bilateral;  L3,4,5    RADIOFREQUENCY THERMAL COAGULATION OF MEDIAL BRANCH OF POSTERIOR RAMUS OF CERVICAL SPINAL NERVE Right 7/3/2018    Procedure: RADIOFREQUENCY THERMAL COAGULATION, NERVE, SPINAL, CERVICAL, MEDIAL BRANCH OF POSTERIOR RAMUS;  Surgeon: Galo Clancy MD;  Location: [facility] OR;  Service: Pain Management;  Laterality: Right;  C4,5,6 - Burned at 80 degrees C. for 75 seconds each site    RADIOFREQUENCY THERMAL COAGULATION OF MEDIAL BRANCH OF POSTERIOR RAMUS OF CERVICAL SPINAL NERVE Right 7/23/2019    Procedure: RADIOFREQUENCY THERMAL COAGULATION, NERVE, SPINAL, CERVICAL, POSTERIOR RAMUS, MEDIAL BRANCH;  Surgeon: Galo Clancy MD;  Location: [facility] OR;  Service: Pain Management;  Laterality: Right;   C4,5,6    RADIOFREQUENCY THERMAL COAGULATION OF MEDIAL BRANCH OF POSTERIOR RAMUS OF CERVICAL SPINAL NERVE Right 6/23/2020    Procedure: RADIOFREQUENCY THERMAL COAGULATION, NERVE, SPINAL, CERVICAL, POSTERIOR RAMUS, MEDIAL BRANCH;  Surgeon: Galo Clancy MD;  Location: [facility] OR;  Service: Pain Management;  Laterality: Right;  C4, 5, 6    RADIOFREQUENCY THERMOCOAGULATION Bilateral 9/10/2019    Procedure: RADIOFREQUENCY THERMAL COAGULATION LUMBAR;  Surgeon: Galo Clancy MD;  Location: [facility] OR;  Service: Pain Management;  Laterality: Bilateral;  L3,4,5 - Burned at 80 degrees C. for 60 seconds x 2 each site    skin cancer removal       TONSILLECTOMY         MEDS:   Current Outpatient Medications on File Prior to Visit   Medication Sig Dispense Refill    apixaban (ELIQUIS) 5 mg Tab Take 1 tablet (5 mg total) by mouth 2 (two) times daily. 60 tablet 11    ARIPiprazole (ABILIFY) 15 MG Tab Take 15 mg by mouth once daily.      aspirin 81 MG Chew TAKE 1 TABLET BY MOUTH EVERY DAY 90 tablet 4    ATIVAN 0.5 mg tablet Take 0.5 mg by mouth 2 (two) times daily as needed for Anxiety.       atropine 1% (ISOPTO ATROPINE) 1 % Drop Place 1 drop into the right eye once daily.      dorzolamide-timolol 2-0.5% (COSOPT) 22.3-6.8 mg/mL ophthalmic solution Place 1 drop into both eyes 2 (two) times daily.       metoprolol tartrate (LOPRESSOR) 25 MG tablet Take 1 tablet (25 mg total) by mouth 2 (two) times daily. 180 tablet 2    mirtazapine (REMERON) 7.5 MG Tab Take 1 tablet (7.5 mg total) by mouth every evening. 30 tablet 5    pantoprazole (PROTONIX) 40 MG tablet Take 1 tablet (40 mg total) by mouth once daily. 90 tablet 3    rosuvastatin (CRESTOR) 40 MG Tab TAKE 1 TABLET (40 MG TOTAL) BY MOUTH EVERY EVENING 90 tablet 3    TRINTELLIX 10 mg Tab Take 1 tablet by mouth nightly.       Current Facility-Administered Medications on File Prior to Visit   Medication Dose Route Frequency Provider Last Rate Last Admin    bupivacaine (PF) 0.25%  (2.5 mg/ml) injection    PRN Galo Clancy MD   6 mL at 19 1314    lidocaine (PF) 10 mg/ml (1%) injection    PRN Galo Clancy MD   9 mL at 19 1304    lidocaine (PF) 20 mg/ml (2%) injection    PRN Galo Clancy MD   6 mL at 19 1310    methylPREDNISolone acetate injection    PRN Galo Clancy MD   80 mg at 19 1314       OB History        6    Para   6    Term   3            AB        Living           SAB        IAB        Ectopic        Multiple        Live Births                     Social History     Socioeconomic History    Marital status:    Tobacco Use    Smoking status: Never Smoker    Smokeless tobacco: Never Used   Substance and Sexual Activity    Alcohol use: No    Drug use: No    Sexual activity: Yes     Partners: Male     Social Determinants of Health     Financial Resource Strain: Low Risk     Difficulty of Paying Living Expenses: Not hard at all   Food Insecurity: No Food Insecurity    Worried About Running Out of Food in the Last Year: Never true    Ran Out of Food in the Last Year: Never true   Transportation Needs: No Transportation Needs    Lack of Transportation (Medical): No    Lack of Transportation (Non-Medical): No   Physical Activity: Inactive    Days of Exercise per Week: 0 days    Minutes of Exercise per Session: 0 min   Stress: No Stress Concern Present    Feeling of Stress : Only a little   Social Connections: Unknown    Frequency of Communication with Friends and Family: Twice a week    Frequency of Social Gatherings with Friends and Family: Twice a week    Active Member of Clubs or Organizations: Yes    Attends Club or Organization Meetings: More than 4 times per year    Marital Status:    Housing Stability: Low Risk     Unable to Pay for Housing in the Last Year: No    Number of Places Lived in the Last Year: 1    Unstable Housing in the Last Year: No       Family History   Problem Relation Age of Onset    Heart  "disease Father     Ulcers Father     Arthritis Mother     Asthma Mother     Rheum arthritis Mother     Pneumonia Mother     Depression Son     Alzheimer's disease Maternal Uncle     Rheum arthritis Maternal Grandmother     Emphysema Maternal Grandfather     Cancer Maternal Grandfather         kidney    Kidney disease Maternal Grandfather     Cancer Paternal Grandmother         lung= smoker    Pneumonia Paternal Grandfather     Breast cancer Neg Hx     Ovarian cancer Neg Hx          Past medical and surgical history reviewed.   I have reviewed the patient's medical history in detail and updated the computerized patient record.        Review of System:   General: no chills, fever, night sweats, weight gain or weight loss  Psychological: no depression or suicidal ideation  Breasts: no new or changing breast lumps, nipple discharge or masses.  Respiratory: no cough, shortness of breath, or wheezing  Cardiovascular: no chest pain or dyspnea on exertion  Gastrointestinal: no abdominal pain, change in bowel habits, or black or bloody stools  Genito-Urinary: no incontinence, urinary frequency/urgency or vulvar/vaginal symptoms, pelvic pain or abnormal vaginal bleeding.  Musculoskeletal: no gait disturbance or muscular weakness      Physical Exam:   /68   Ht 5' 7" (1.702 m)   Wt 45.4 kg (100 lb 1.4 oz)   BMI 15.68 kg/m²   Constitutional: She appears alert and responsive. She appears well-developed, well-groomed, and well-nourished. No distress. Thin  HENT:   Head: Normocephalic and atraumatic.   Eyes: Conjunctivae and EOM are normal. No scleral icterus.   Neck: Symmetrical. Normal range of motion. Neck supple. No tracheal deviation present.   Cardiovascular: Normal rate, no rhythm abnormality noted. Extremities without swelling or edema, warm.    Pulmonary/Chest: Normal respiratory Effort. No distress or retractions. She exhibits no tenderness.  Breasts: are symmetrical   Right breast exhibits no " inverted nipple, no mass, no nipple discharge, no skin change and no tenderness.   Left breast exhibits no inverted nipple, no mass, no nipple discharge, no skin change and no tenderness.  Abdominal: Soft. She exhibits no distension, hernias or masses. There is no tenderness. No enlargement of liver edge or spleen.  There is no rebound and no guarding.   Genitourinary:    External rectal exam shows no thrombosed external hemorrhoids, no lesions.     Pelvic exam was performed with patient supine.   No labial fusion, and symmetrical.    There is no rash, lesion or injury on the right labia.   There is no rash, lesion or injury on the left labia.   No bleeding and no signs of injury around the vaginal introitus, urethral meatus is normal size and without prolapse or lesions, urethra well supported. The cervix is visualized with no discharge, lesions or friability.   No vaginal discharge found.    No significant Cystocele, Enterocele or rectocele, and cervix and uterus well supported.   Bimanual exam:   The urethra is normal to palpation and there are no palpable vaginal wall masses.   Uterus is not deviated, not enlarged, not fixed, normal shape and not tender.   Cervix exhibits no motion tenderness.    Right adnexum displays no mass or nodularity and no tenderness.   Left adnexum displays no mass or nodularity and no tenderness.  Musculoskeletal: Normal range of motion.   Lymphadenopathy: No inguinal adenopathy present.   Neurological: She is alert and oriented to person, place, and time. Coordination normal.   Skin: Skin is warm and dry. She is not diaphoretic. No rashes, lesions or ulcers.   Psychiatric: She has a normal mood and affect, oriented to person, place, and time.      Assessment:   Normal annual GYN exam  1. Visit for screening mammogram  Mammo Digital Screening Bilat w/ [facility]   weight loss, counseled     Plan:   PAP  Mammogram  Follow up in 1 year.  Patient informed will be contacted with results within 2  weeks. Encouraged to please call back or email if she has not heard from us by then.

## 2022-06-09 ENCOUNTER — EXTERNAL HOME HEALTH (OUTPATIENT)
Dept: HOME HEALTH SERVICES | Facility: HOSPITAL | Age: 77
End: 2022-06-09
Payer: MEDICARE

## 2022-06-12 LAB
FINAL PATHOLOGIC DIAGNOSIS: NORMAL
Lab: NORMAL

## 2022-06-14 ENCOUNTER — PATIENT MESSAGE (OUTPATIENT)
Dept: CARDIOLOGY | Facility: CLINIC | Age: 77
End: 2022-06-14
Payer: MEDICARE

## 2022-06-20 ENCOUNTER — PATIENT MESSAGE (OUTPATIENT)
Dept: FAMILY MEDICINE | Facility: CLINIC | Age: 77
End: 2022-06-20
Payer: MEDICARE

## 2022-06-20 DIAGNOSIS — F50.9 EATING DISORDER, UNSPECIFIED TYPE: Primary | ICD-10-CM

## 2022-06-28 ENCOUNTER — OFFICE VISIT (OUTPATIENT)
Dept: HOME HEALTH SERVICES | Facility: CLINIC | Age: 77
End: 2022-06-28
Payer: MEDICARE

## 2022-06-28 VITALS
HEIGHT: 67 IN | OXYGEN SATURATION: 98 % | DIASTOLIC BLOOD PRESSURE: 64 MMHG | TEMPERATURE: 98 F | BODY MASS INDEX: 15.85 KG/M2 | WEIGHT: 101 LBS | SYSTOLIC BLOOD PRESSURE: 103 MMHG | HEART RATE: 62 BPM

## 2022-06-28 DIAGNOSIS — Z95.0 CARDIAC PACEMAKER IN SITU: ICD-10-CM

## 2022-06-28 DIAGNOSIS — I10 PRIMARY HYPERTENSION: ICD-10-CM

## 2022-06-28 DIAGNOSIS — Z00.00 ENCOUNTER FOR PREVENTIVE HEALTH EXAMINATION: Primary | ICD-10-CM

## 2022-06-28 DIAGNOSIS — K21.9 GASTROESOPHAGEAL REFLUX DISEASE WITHOUT ESOPHAGITIS: ICD-10-CM

## 2022-06-28 DIAGNOSIS — J84.9 INTERSTITIAL LUNG DISEASE: ICD-10-CM

## 2022-06-28 DIAGNOSIS — Z86.73 H/O: CVA (CEREBROVASCULAR ACCIDENT): ICD-10-CM

## 2022-06-28 DIAGNOSIS — Z79.01 ANTICOAGULANT LONG-TERM USE: ICD-10-CM

## 2022-06-28 DIAGNOSIS — Z86.718 HISTORY OF DVT (DEEP VEIN THROMBOSIS): ICD-10-CM

## 2022-06-28 DIAGNOSIS — J45.30 MILD PERSISTENT ASTHMA WITHOUT COMPLICATION: ICD-10-CM

## 2022-06-28 DIAGNOSIS — I50.32 CHRONIC DIASTOLIC HEART FAILURE: ICD-10-CM

## 2022-06-28 DIAGNOSIS — I48.0 PAROXYSMAL ATRIAL FIBRILLATION: ICD-10-CM

## 2022-06-28 DIAGNOSIS — E44.1 MILD PROTEIN-CALORIE MALNUTRITION: ICD-10-CM

## 2022-06-28 DIAGNOSIS — F33.41 RECURRENT MAJOR DEPRESSIVE DISORDER, IN PARTIAL REMISSION: ICD-10-CM

## 2022-06-28 DIAGNOSIS — E78.2 MIXED HYPERLIPIDEMIA: ICD-10-CM

## 2022-06-28 DIAGNOSIS — R63.6 UNDERWEIGHT: ICD-10-CM

## 2022-06-28 DIAGNOSIS — F41.1 GAD (GENERALIZED ANXIETY DISORDER): ICD-10-CM

## 2022-06-28 PROCEDURE — G0439 PPPS, SUBSEQ VISIT: HCPCS | Mod: S$GLB,,, | Performed by: NURSE PRACTITIONER

## 2022-06-28 PROCEDURE — G0439 PR MEDICARE ANNUAL WELLNESS SUBSEQUENT VISIT: ICD-10-PCS | Mod: S$GLB,,, | Performed by: NURSE PRACTITIONER

## 2022-06-28 RX ORDER — BUSPIRONE HYDROCHLORIDE 10 MG/1
10 TABLET ORAL 3 TIMES DAILY
COMMUNITY
End: 2022-12-08 | Stop reason: SDUPTHER

## 2022-06-30 NOTE — PATIENT INSTRUCTIONS
Counseling and Referral of Other Preventative  (Italic type indicates deductible and co-insurance are waived)    Patient Name: Ayla Dela Cruz  Today's Date: 6/30/2022    Health Maintenance       Date Due Completion Date    Shingles Vaccine (1 of 2) Never done ---    COVID-19 Vaccine (4 - Booster for Moderna series) 04/27/2022 12/27/2021    Colonoscopy 10/10/2022 10/10/2019    Override on 3/12/2003: Done (In [facility] )    Lipid Panel 02/17/2023 2/17/2022    DEXA Scan 02/17/2025 2/17/2022    Override on 11/12/2014: Declined    TETANUS VACCINE 08/30/2030 8/30/2020        No orders of the defined types were placed in this encounter.    The following information is provided to all patients.  This information is to help you find resources for any of the problems found today that may be affecting your health:                Living healthy guide: www.[facility].gov      Understanding Diabetes: www.diabetes.org      Eating healthy: www.cdc.gov/healthyweight      CDC home safety checklist: www.cdc.gov/steadi/patient.html      Agency on Aging: www.goea.[place].[facility]      Alcoholics anonymous (AA): www.aa.org      Physical Activity: www.david.nih.gov/xw0svpy      Tobacco use: www.quitwithusla.org

## 2022-06-30 NOTE — PROGRESS NOTES
"  Ayla Dela Cruz presented for a  Medicare AWV and comprehensive Health Risk Assessment today. The following components were reviewed and updated:    · Medical history  · Family History  · Social history  · Allergies and Current Medications  · Health Risk Assessment  · Health Maintenance  · Care Team         ** See Completed Assessments for Annual Wellness Visit within the encounter summary.**         The following assessments were completed:  · Living Situation  · CAGE  · Depression Screening  · Timed Get Up and Go  · Whisper Test  · Cognitive Function Screening - not performed due to vision  · Nutrition Screening  · ADL Screening  · PAQ Screening        Vitals:    06/28/22 0905   BP: 103/64   Pulse: 62   Temp: 97.7 °F (36.5 °C)   SpO2: 98%   Weight: 45.8 kg (101 lb)   Height: 5' 7" (1.702 m)     Body mass index is 15.82 kg/m².  Physical Exam  Vitals reviewed.   Constitutional:       Appearance: She is underweight. She is ill-appearing.   HENT:      Head: Normocephalic.   Eyes:      Pupils: Pupils are equal, round, and reactive to light.   Cardiovascular:      Rate and Rhythm: Normal rate and regular rhythm.      Heart sounds: Normal heart sounds.   Pulmonary:      Effort: Pulmonary effort is normal.      Breath sounds: Normal breath sounds.   Abdominal:      General: Bowel sounds are normal.      Palpations: Abdomen is soft.   Musculoskeletal:         General: Normal range of motion.      Cervical back: Normal range of motion.   Skin:     General: Skin is warm and dry.   Neurological:      Mental Status: She is alert and oriented to person, place, and time.      Gait: Gait abnormal (slow unsteady).   Psychiatric:         Behavior: Behavior normal.               Diagnoses and health risks identified today and associated recommendations/orders:    1. Encounter for preventive health examination  - Above assessments completed. Preventive measures and health maintenance reviewed with patient and .  -Encouraged " overdue vaccines    2. Paroxysmal atrial fibrillation, ablations X 3 1995, 1997, 9/2017, CHADS-VAS score 5, HAS-BLED score 5   Stable, followed by Cardiology  -on apixaban, asa, metoprolol    3. Chronic diastolic heart failure, 2014  Stable, followed by Cards  -no acute issues, no edema, no sob    4. Cardiac pacemaker in situ, [employer], dual chamber, 10/14/2021  -managed by Cardiology    5. Recurrent major depressive disorder, in partial remission  Stable and chronic, followed by Psychiatry  -on aripiprazole, buspirone, mirtazapine, trintellix    6. JOSE (generalized anxiety disorder)  Stable and chronic, followed by Psychiatry  -on ativan    7. Mild persistent asthma without complication  8. Interstitial lung disease  Stable, followed by Pulmonology    9. History of DVT (deep vein thrombosis)  10. H/O: CVA (cerebrovascular accident), June 2014  11. Anticoagulant long-term use  Stable, followed by PCP and Cardiology  -on apixaban and asa    12. Primary hypertension  Stable, followed by PCP  -on metoprolol    13. Mixed hyperlipidemia  Stable, followed by PCP  -on rosuvastatin    14. Gastroesophageal reflux disease without esophagitis  Stable, followed by PCP  -on PPI    15. Mild protein-calorie malnutrition  16. Underweight  -patient is currently under psychiatry care for this diagnosis        Provided Ayla with a 5-10 year written screening schedule and personal prevention plan. Recommendations were developed using the USPSTF age appropriate recommendations. Education, counseling, and referrals were provided as needed. After Visit Summary printed and given to patient which includes a list of additional screenings\tests needed.    Follow up in about 1 year (around 6/28/2023) for your next annual wellness visit.    Marguerite Gerard NP    I offered to discuss advanced care planning, including how to pick a person who would make decisions for you if you were unable to make them for yourself, called a health care  power of , and what kind of decisions you might make such as use of life sustaining treatments such as ventilators and tube feeding when faced with a life limiting illness recorded on a living will that they will need to know. (How you want to be cared for as you near the end of your natural life)     X  Patient has advanced directives on file, which we reviewed, and they do not wish to make changes.

## 2022-07-05 ENCOUNTER — TELEPHONE (OUTPATIENT)
Dept: OPHTHALMOLOGY | Facility: CLINIC | Age: 77
End: 2022-07-05
Payer: MEDICARE

## 2022-07-05 NOTE — TELEPHONE ENCOUNTER
----- Message from Akanksha Causey sent at 7/5/2022 10:08 AM CDT -----  Contact:   Type: Needs Medical Advice    Who Called:   Best Call Back Number: [phone]    Inquiry/Question:  states pt has blood in her right eye that needs to be drained. He wants to know if you can do this?       Thank you~

## 2022-07-06 ENCOUNTER — OFFICE VISIT (OUTPATIENT)
Dept: OPHTHALMOLOGY | Facility: CLINIC | Age: 77
End: 2022-07-06
Payer: MEDICARE

## 2022-07-06 DIAGNOSIS — H40.1134 PRIMARY OPEN ANGLE GLAUCOMA (POAG) OF BOTH EYES, INDETERMINATE STAGE: ICD-10-CM

## 2022-07-06 DIAGNOSIS — H43.11 VITREOUS HEMORRHAGE, RIGHT EYE: ICD-10-CM

## 2022-07-06 DIAGNOSIS — H21.01 HYPHEMA, RIGHT EYE: Primary | ICD-10-CM

## 2022-07-06 PROBLEM — Z96.1 PSEUDOPHAKIA OF BOTH EYES: Status: ACTIVE | Noted: 2022-07-06

## 2022-07-06 PROCEDURE — 99999 PR PBB SHADOW E&M-EST. PATIENT-LVL II: ICD-10-PCS | Mod: PBBFAC,,, | Performed by: OPHTHALMOLOGY

## 2022-07-06 PROCEDURE — 76512 B SCAN: ICD-10-PCS | Mod: RT,S$GLB,, | Performed by: OPHTHALMOLOGY

## 2022-07-06 PROCEDURE — 76512 OPH US DX B-SCAN: CPT | Mod: RT,S$GLB,, | Performed by: OPHTHALMOLOGY

## 2022-07-06 PROCEDURE — 99204 PR OFFICE/OUTPT VISIT, NEW, LEVL IV, 45-59 MIN: ICD-10-PCS | Mod: S$GLB,,, | Performed by: OPHTHALMOLOGY

## 2022-07-06 PROCEDURE — 99999 PR PBB SHADOW E&M-EST. PATIENT-LVL II: CPT | Mod: PBBFAC,,, | Performed by: OPHTHALMOLOGY

## 2022-07-06 PROCEDURE — 99204 OFFICE O/P NEW MOD 45 MIN: CPT | Mod: S$GLB,,, | Performed by: OPHTHALMOLOGY

## 2022-07-06 RX ORDER — BRIMONIDINE TARTRATE 2 MG/ML
1 SOLUTION/ DROPS OPHTHALMIC 3 TIMES DAILY
Qty: 5 ML | Refills: 3 | Status: SHIPPED | OUTPATIENT
Start: 2022-07-06 | End: 2022-07-27

## 2022-07-06 RX ORDER — PREDNISOLONE ACETATE 10 MG/ML
1 SUSPENSION/ DROPS OPHTHALMIC EVERY 4 HOURS
Qty: 5 ML | Refills: 3 | Status: SHIPPED | OUTPATIENT
Start: 2022-07-06 | End: 2022-09-06 | Stop reason: ALTCHOICE

## 2022-07-06 NOTE — PROGRESS NOTES
HPI     Pt presents via referral for hyphema of the right eye. Began to notice it   on 6/23 when she woke up and noticed she was unable to see.     Has been being followed by Dr Arreola.    Ocular meds:   Dorzolamide/Timolol BID OU  PF 6x day OD   Atropine BID OD     Last edited by Yamileth Vazquez on 7/6/2022  1:11 PM. (History)         A/P    ICD-10-CM ICD-9-CM   1. Hyphema, right eye  H21.01 364.41   2. Vitreous hemorrhage, right eye  H43.11 379.23   3. Primary open angle glaucoma (POAG) of both eyes, indeterminate stage  H40.1134 365.11     365.74       1. Hyphema, right eye  2. Vitreous hemorrhage, right eye  Referral from Dr. Arreola for hyphema  Pt on Eliquis, occurred just few days prior to June 23rd appt, no trauma straining during that period  Remote hx of prior hyphema in past also that cleared    Today 4mm inf hyphema, no NVI, no corneal blood staining, B scan with VH likely overflow from AC mild VH only    Plan: no corneal blood staining but still significant heme, we can observe still and incr PF to q1h, incr atropine to BID and add Brim TID  Will have pt see Dr Tan tomorrow for IOP/cornea check, discussed with patient and  possibility of needing AC washout if not improving or signs of blood staining    Etiology may be secondary to UGH if IOL is causing chaffing, eliquis, or possible RVO component in posterior segment that we cannot see today though there is no angel NVI on exam but limited due to AC heme    3. Primary open angle glaucoma (POAG) of both eyes, indeterminate stage  IOP 27/8, using cosopt BID OU  Add Brim TID OD  Will have pt see Dr Tan tomorrow for IOP check    RTC Britney tomorrow IOP check, cornea check, eval for AC washout coming days      I saw and examined the patient and reviewed in detail the findings documented. The final examination findings, image interpretations, and plan as documented in the record represent my personal judgment and conclusions.    Parag Carrizales,  MD  Vitreoretinal Surgery   [facility]

## 2022-07-07 ENCOUNTER — OFFICE VISIT (OUTPATIENT)
Dept: OPHTHALMOLOGY | Facility: CLINIC | Age: 77
End: 2022-07-07
Payer: MEDICARE

## 2022-07-07 DIAGNOSIS — H21.01 HYPHEMA, RIGHT EYE: Primary | ICD-10-CM

## 2022-07-07 DIAGNOSIS — H40.1134 PRIMARY OPEN ANGLE GLAUCOMA (POAG) OF BOTH EYES, INDETERMINATE STAGE: ICD-10-CM

## 2022-07-07 PROCEDURE — 99999 PR PBB SHADOW E&M-EST. PATIENT-LVL III: CPT | Mod: PBBFAC,,, | Performed by: OPHTHALMOLOGY

## 2022-07-07 PROCEDURE — 99999 PR PBB SHADOW E&M-EST. PATIENT-LVL III: ICD-10-PCS | Mod: PBBFAC,,, | Performed by: OPHTHALMOLOGY

## 2022-07-07 PROCEDURE — 99214 OFFICE O/P EST MOD 30 MIN: CPT | Mod: S$GLB,,, | Performed by: OPHTHALMOLOGY

## 2022-07-07 PROCEDURE — 99214 PR OFFICE/OUTPT VISIT, EST, LEVL IV, 30-39 MIN: ICD-10-PCS | Mod: S$GLB,,, | Performed by: OPHTHALMOLOGY

## 2022-07-07 NOTE — PROGRESS NOTES
HPI     Pt here for AC was out eval per Dr. Carrizales. Pt states she has not had any   changes since yesterday. Denies pain in eyes.      Gtts:     PF OD Q4H   Atropine OD QD   Dorz/ rm OU QID   Brimonidine OD TID     Last edited by Naima Lynch on 7/7/2022  1:55 PM. (History)            Assessment /Plan     For exam results, see Encounter Report.    Hyphema, right eye    Primary open angle glaucoma (POAG) of both eyes, indeterminate stage      1. Hyphema, right eye   reports patient was on atropine chronically in the right eye, potentially due IOL malposition.  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 Given the old clot appearance of the hyphema, I suspect it's unlikely it will clear on its own.  Will likely perform AC washout in the near future, but would like to review old records and pt has appt with Dr. white next week.    Pt will ask cardiologist if she is able to stop Eliquis for 1 week perioperatively    IOP slightly improved    Okay to reduce PF to 6 x day  Continue atropine bid  Continue brim TID OD, dorz/rm bid OU     F/u 1 week, review old records  Consider schedule AC washout  May need IOL reposition/removal in the future  Pt and  aware that multiple procedures may be necessary to improve vision     2. Primary open angle glaucoma (POAG) of both eyes, indeterminate stage  Glaucoma drops as above

## 2022-07-11 ENCOUNTER — CLINICAL SUPPORT (OUTPATIENT)
Dept: CARDIOLOGY | Facility: HOSPITAL | Age: 77
End: 2022-07-11
Payer: MEDICARE

## 2022-07-11 DIAGNOSIS — Z95.0 PRESENCE OF CARDIAC PACEMAKER: ICD-10-CM

## 2022-07-11 PROCEDURE — 93294 REM INTERROG EVL PM/LDLS PM: CPT | Mod: ,,, | Performed by: INTERNAL MEDICINE

## 2022-07-11 PROCEDURE — 93296 REM INTERROG EVL PM/IDS: CPT | Performed by: INTERNAL MEDICINE

## 2022-07-11 PROCEDURE — 93294 CARDIAC DEVICE CHECK - REMOTE: ICD-10-PCS | Mod: ,,, | Performed by: INTERNAL MEDICINE

## 2022-07-14 ENCOUNTER — OFFICE VISIT (OUTPATIENT)
Dept: OPHTHALMOLOGY | Facility: CLINIC | Age: 77
End: 2022-07-14
Payer: MEDICARE

## 2022-07-14 DIAGNOSIS — H21.01 HYPHEMA, RIGHT EYE: Primary | ICD-10-CM

## 2022-07-14 PROCEDURE — 99999 PR PBB SHADOW E&M-EST. PATIENT-LVL III: ICD-10-PCS | Mod: PBBFAC,,, | Performed by: OPHTHALMOLOGY

## 2022-07-14 PROCEDURE — 99213 PR OFFICE/OUTPT VISIT, EST, LEVL III, 20-29 MIN: ICD-10-PCS | Mod: S$GLB,,, | Performed by: OPHTHALMOLOGY

## 2022-07-14 PROCEDURE — 99213 OFFICE O/P EST LOW 20 MIN: CPT | Mod: S$GLB,,, | Performed by: OPHTHALMOLOGY

## 2022-07-14 PROCEDURE — 99999 PR PBB SHADOW E&M-EST. PATIENT-LVL III: CPT | Mod: PBBFAC,,, | Performed by: OPHTHALMOLOGY

## 2022-07-14 NOTE — PROGRESS NOTES
HPI     DLS: 7/7/2022- Hyphema OD     Pt states OD is doing about the same. No new complaints.     Gtts:     PF 6x day   Atropine OD BID   Brim OD TID   Dorz/ rm Ou BID       Last edited by Naima Lynch on 7/14/2022  9:46 AM. (History)            Assessment /Plan     For exam results, see Encounter Report.    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  If not improved, will obtain consent for AC washout with possible IOL removal.

## 2022-07-15 ENCOUNTER — PATIENT MESSAGE (OUTPATIENT)
Dept: CARDIOLOGY | Facility: CLINIC | Age: 77
End: 2022-07-15
Payer: MEDICARE

## 2022-07-15 ENCOUNTER — PATIENT MESSAGE (OUTPATIENT)
Dept: FAMILY MEDICINE | Facility: CLINIC | Age: 77
End: 2022-07-15
Payer: MEDICARE

## 2022-07-18 ENCOUNTER — PATIENT MESSAGE (OUTPATIENT)
Dept: FAMILY MEDICINE | Facility: CLINIC | Age: 77
End: 2022-07-18
Payer: MEDICARE

## 2022-07-18 NOTE — TELEPHONE ENCOUNTER
Cardiology has already given them permission to hold the Eliquis for a few days before the washout.  I do not see any other problems.    Dr. Tan was concerned that it may be a problem with a loose lens that may need to be removed or replaced.

## 2022-07-21 ENCOUNTER — PATIENT MESSAGE (OUTPATIENT)
Dept: CARDIOLOGY | Facility: CLINIC | Age: 77
End: 2022-07-21
Payer: MEDICARE

## 2022-07-21 ENCOUNTER — OFFICE VISIT (OUTPATIENT)
Dept: OPHTHALMOLOGY | Facility: CLINIC | Age: 77
End: 2022-07-21
Payer: MEDICARE

## 2022-07-21 ENCOUNTER — PATIENT MESSAGE (OUTPATIENT)
Dept: FAMILY MEDICINE | Facility: CLINIC | Age: 77
End: 2022-07-21
Payer: MEDICARE

## 2022-07-21 DIAGNOSIS — H21.01 HYPHEMA, RIGHT EYE: Primary | ICD-10-CM

## 2022-07-21 PROCEDURE — 1159F PR MEDICATION LIST DOCUMENTED IN MEDICAL RECORD: ICD-10-PCS | Mod: CPTII,S$GLB,, | Performed by: OPHTHALMOLOGY

## 2022-07-21 PROCEDURE — 1157F PR ADVANCE CARE PLAN OR EQUIV PRESENT IN MEDICAL RECORD: ICD-10-PCS | Mod: CPTII,S$GLB,, | Performed by: OPHTHALMOLOGY

## 2022-07-21 PROCEDURE — 99214 PR OFFICE/OUTPT VISIT, EST, LEVL IV, 30-39 MIN: ICD-10-PCS | Mod: S$GLB,,, | Performed by: OPHTHALMOLOGY

## 2022-07-21 PROCEDURE — 1160F PR REVIEW ALL MEDS BY PRESCRIBER/CLIN PHARMACIST DOCUMENTED: ICD-10-PCS | Mod: CPTII,S$GLB,, | Performed by: OPHTHALMOLOGY

## 2022-07-21 PROCEDURE — 99999 PR PBB SHADOW E&M-EST. PATIENT-LVL IV: ICD-10-PCS | Mod: PBBFAC,,, | Performed by: OPHTHALMOLOGY

## 2022-07-21 PROCEDURE — 1126F AMNT PAIN NOTED NONE PRSNT: CPT | Mod: CPTII,S$GLB,, | Performed by: OPHTHALMOLOGY

## 2022-07-21 PROCEDURE — 1159F MED LIST DOCD IN RCRD: CPT | Mod: CPTII,S$GLB,, | Performed by: OPHTHALMOLOGY

## 2022-07-21 PROCEDURE — 1157F ADVNC CARE PLAN IN RCRD: CPT | Mod: CPTII,S$GLB,, | Performed by: OPHTHALMOLOGY

## 2022-07-21 PROCEDURE — 99214 OFFICE O/P EST MOD 30 MIN: CPT | Mod: S$GLB,,, | Performed by: OPHTHALMOLOGY

## 2022-07-21 PROCEDURE — 99999 PR PBB SHADOW E&M-EST. PATIENT-LVL IV: CPT | Mod: PBBFAC,,, | Performed by: OPHTHALMOLOGY

## 2022-07-21 PROCEDURE — 1160F RVW MEDS BY RX/DR IN RCRD: CPT | Mod: CPTII,S$GLB,, | Performed by: OPHTHALMOLOGY

## 2022-07-21 PROCEDURE — 1126F PR PAIN SEVERITY QUANTIFIED, NO PAIN PRESENT: ICD-10-PCS | Mod: CPTII,S$GLB,, | Performed by: OPHTHALMOLOGY

## 2022-07-21 RX ORDER — SODIUM CHLORIDE 9 MG/ML
INJECTION, SOLUTION INTRAVENOUS CONTINUOUS
Status: CANCELLED | OUTPATIENT
Start: 2022-07-21

## 2022-07-21 RX ORDER — TOBRAMYCIN 3 MG/ML
1 SOLUTION/ DROPS OPHTHALMIC 4 TIMES DAILY
Qty: 5 ML | Refills: 0 | Status: SHIPPED | OUTPATIENT
Start: 2022-07-21 | End: 2022-09-06 | Stop reason: ALTCHOICE

## 2022-07-21 RX ORDER — PROPARACAINE HYDROCHLORIDE 5 MG/ML
1 SOLUTION/ DROPS OPHTHALMIC
Status: CANCELLED | OUTPATIENT
Start: 2022-07-21

## 2022-07-21 NOTE — PROGRESS NOTES
HPI     Patient presents for IOP check and hyphema follow up       States no new changes to the eye, states appears to be stable.      PF QID   atropine bid  brim TID OD  dorz/rm bid OU        Last edited by Yamileth Vazquez on 7/21/2022  9:25 AM. (History)            Assessment /Plan     For exam results, see Encounter Report.    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  Consent signed.  Pt and  aware that more surgery likely, and that may need retina surgery.    AC washout scheduled for next week  Pt will need to get clearance for procedure  Stop eliquis 2 days prior to procedure    continue PF 4 x day  Continue atropine bid  Continue brim BID OD, dorz/rm bid OU

## 2022-07-22 ENCOUNTER — OFFICE VISIT (OUTPATIENT)
Dept: FAMILY MEDICINE | Facility: CLINIC | Age: 77
End: 2022-07-22
Payer: MEDICARE

## 2022-07-22 VITALS
OXYGEN SATURATION: 99 % | WEIGHT: 99.19 LBS | SYSTOLIC BLOOD PRESSURE: 130 MMHG | BODY MASS INDEX: 15.57 KG/M2 | DIASTOLIC BLOOD PRESSURE: 68 MMHG | HEIGHT: 67 IN | TEMPERATURE: 96 F | HEART RATE: 61 BPM

## 2022-07-22 DIAGNOSIS — F33.41 RECURRENT MAJOR DEPRESSIVE DISORDER, IN PARTIAL REMISSION: ICD-10-CM

## 2022-07-22 DIAGNOSIS — J45.20 MILD INTERMITTENT ASTHMA WITHOUT COMPLICATION: ICD-10-CM

## 2022-07-22 DIAGNOSIS — I48.0 PAROXYSMAL ATRIAL FIBRILLATION: ICD-10-CM

## 2022-07-22 DIAGNOSIS — Z01.818 PREOP EXAMINATION: Primary | ICD-10-CM

## 2022-07-22 DIAGNOSIS — R41.89 COGNITIVE IMPAIRMENT: ICD-10-CM

## 2022-07-22 DIAGNOSIS — F41.1 GAD (GENERALIZED ANXIETY DISORDER): ICD-10-CM

## 2022-07-22 DIAGNOSIS — H21.01 HYPHEMA, RIGHT EYE: ICD-10-CM

## 2022-07-22 PROCEDURE — 1101F PR PT FALLS ASSESS DOC 0-1 FALLS W/OUT INJ PAST YR: ICD-10-PCS | Mod: CPTII,S$GLB,, | Performed by: NURSE PRACTITIONER

## 2022-07-22 PROCEDURE — 3288F PR FALLS RISK ASSESSMENT DOCUMENTED: ICD-10-PCS | Mod: CPTII,S$GLB,, | Performed by: NURSE PRACTITIONER

## 2022-07-22 PROCEDURE — 3078F PR MOST RECENT DIASTOLIC BLOOD PRESSURE < 80 MM HG: ICD-10-PCS | Mod: CPTII,S$GLB,, | Performed by: NURSE PRACTITIONER

## 2022-07-22 PROCEDURE — 1159F MED LIST DOCD IN RCRD: CPT | Mod: CPTII,S$GLB,, | Performed by: NURSE PRACTITIONER

## 2022-07-22 PROCEDURE — 99999 PR PBB SHADOW E&M-EST. PATIENT-LVL IV: CPT | Mod: PBBFAC,,, | Performed by: NURSE PRACTITIONER

## 2022-07-22 PROCEDURE — 1160F PR REVIEW ALL MEDS BY PRESCRIBER/CLIN PHARMACIST DOCUMENTED: ICD-10-PCS | Mod: CPTII,S$GLB,, | Performed by: NURSE PRACTITIONER

## 2022-07-22 PROCEDURE — 3075F SYST BP GE 130 - 139MM HG: CPT | Mod: CPTII,S$GLB,, | Performed by: NURSE PRACTITIONER

## 2022-07-22 PROCEDURE — 3078F DIAST BP <80 MM HG: CPT | Mod: CPTII,S$GLB,, | Performed by: NURSE PRACTITIONER

## 2022-07-22 PROCEDURE — 1126F PR PAIN SEVERITY QUANTIFIED, NO PAIN PRESENT: ICD-10-PCS | Mod: CPTII,S$GLB,, | Performed by: NURSE PRACTITIONER

## 2022-07-22 PROCEDURE — 1126F AMNT PAIN NOTED NONE PRSNT: CPT | Mod: CPTII,S$GLB,, | Performed by: NURSE PRACTITIONER

## 2022-07-22 PROCEDURE — 1157F ADVNC CARE PLAN IN RCRD: CPT | Mod: CPTII,S$GLB,, | Performed by: NURSE PRACTITIONER

## 2022-07-22 PROCEDURE — 1101F PT FALLS ASSESS-DOCD LE1/YR: CPT | Mod: CPTII,S$GLB,, | Performed by: NURSE PRACTITIONER

## 2022-07-22 PROCEDURE — 1157F PR ADVANCE CARE PLAN OR EQUIV PRESENT IN MEDICAL RECORD: ICD-10-PCS | Mod: CPTII,S$GLB,, | Performed by: NURSE PRACTITIONER

## 2022-07-22 PROCEDURE — 3288F FALL RISK ASSESSMENT DOCD: CPT | Mod: CPTII,S$GLB,, | Performed by: NURSE PRACTITIONER

## 2022-07-22 PROCEDURE — 1159F PR MEDICATION LIST DOCUMENTED IN MEDICAL RECORD: ICD-10-PCS | Mod: CPTII,S$GLB,, | Performed by: NURSE PRACTITIONER

## 2022-07-22 PROCEDURE — 3075F PR MOST RECENT SYSTOLIC BLOOD PRESS GE 130-139MM HG: ICD-10-PCS | Mod: CPTII,S$GLB,, | Performed by: NURSE PRACTITIONER

## 2022-07-22 PROCEDURE — 99214 OFFICE O/P EST MOD 30 MIN: CPT | Mod: S$GLB,,, | Performed by: NURSE PRACTITIONER

## 2022-07-22 PROCEDURE — 99999 PR PBB SHADOW E&M-EST. PATIENT-LVL IV: ICD-10-PCS | Mod: PBBFAC,,, | Performed by: NURSE PRACTITIONER

## 2022-07-22 PROCEDURE — 99214 PR OFFICE/OUTPT VISIT, EST, LEVL IV, 30-39 MIN: ICD-10-PCS | Mod: S$GLB,,, | Performed by: NURSE PRACTITIONER

## 2022-07-22 PROCEDURE — 1160F RVW MEDS BY RX/DR IN RCRD: CPT | Mod: CPTII,S$GLB,, | Performed by: NURSE PRACTITIONER

## 2022-07-22 NOTE — PROGRESS NOTES
Subjective:       Patient ID: Ayla Dela Cruz is a 77 y.o. female.    Chief Complaint: Pre-op Exam    HPI      Present presents today for pre-op clearance for right eye wash on 7/27/22 by . needs eliquis to be held  2 days prior to procedure.        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 Significant weight loss due to major depression off Limbitrol (stopped 2010), diet controlled T2DM.    Past Medical History:   Diagnosis Date    Anticoagulant long-term use     Anxiety     Arthritis     Atrial fibrillation     Cancer     skin    CHF (congestive heart failure)     Depression     DVT (deep venous thrombosis)     GERD (gastroesophageal reflux disease)     Glaucoma (increased eye pressure)     Hyperlipidemia     diet controlled    Hypertension     Interstitial lung disease     Localized hives 1/10/2020    Mild persistent asthma without complication 11/12/2018    Pneumonia 1/31/2014    Stroke 6-3-14    Stroke     TIA (transient ischemic attack)     TIA (transient ischemic attack)        Review of patient's allergies indicates:   Allergen Reactions    Gabapentin Hallucinations    Xarelto [rivaroxaban] Rash    Bactrim [sulfamethoxazole-trimethoprim] Other (See Comments)     Upset stomach, dry heaves, confusion    Afrin (pseudoephedrine)     Amoxicillin-pot clavulanate      Other reaction(s): Mental Status Change    Atorvastatin      Other reaction(s): Joint pain    Baclofen     Baclofen (bulk) Nausea And Vomiting    Ciprofloxacin     Ciprofloxacin (bulk)     Decongest tabs      Other reaction(s): increased heart rate    Decongestant [pseudoephedrine hcl]     Erythromycin Other (See Comments)    Flecainide Hives     And SOB. No reaction to Lidocaine     Fluoxetine      Other reaction(s): heart palpitations  Other reaction(s): anxiety    Lisinopril Other  (See Comments)     cough    Losartan Other (See Comments)     Hypotension with lightheadedness    Morphine Other (See Comments)     confusion    Tramadol Other (See Comments)     SOB, low BP    Venlafaxine     Venlafaxine analogues      Changes in BP and increases heart rate       Afrin [oxymetazoline] Palpitations    Caffeine Palpitations    Dabigatran etexilate Rash    Tizanidine Anxiety     dizziness         Current Outpatient Medications:     apixaban (ELIQUIS) 5 mg Tab, Take 1 tablet (5 mg total) by mouth 2 (two) times daily., Disp: 60 tablet, Rfl: 11    ARIPiprazole (ABILIFY) 15 MG Tab, Take 15 mg by mouth once daily., Disp: , Rfl:     aspirin 81 MG Chew, TAKE 1 TABLET BY MOUTH EVERY DAY, Disp: 90 tablet, Rfl: 4    ATIVAN 0.5 mg tablet, Take 0.5 mg by mouth 2 (two) times daily as needed for Anxiety. , Disp: , Rfl:     atropine 1% (ISOPTO ATROPINE) 1 % Drop, Place 1 drop into the right eye once daily., Disp: , Rfl:     busPIRone (BUSPAR) 10 MG tablet, Take 10 mg by mouth 3 (three) times daily., Disp: , Rfl:     metoprolol tartrate (LOPRESSOR) 25 MG tablet, Take 1 tablet (25 mg total) by mouth 2 (two) times daily., Disp: 180 tablet, Rfl: 2    mirtazapine (REMERON) 7.5 MG Tab, Take 1 tablet (7.5 mg total) by mouth every evening., Disp: 30 tablet, Rfl: 5    pantoprazole (PROTONIX) 40 MG tablet, Take 1 tablet (40 mg total) by mouth once daily., Disp: 90 tablet, Rfl: 3    prednisoLONE acetate (PRED FORTE) 1 % DrpS, Place 1 drop into the right eye every 4 (four) hours., Disp: 5 mL, Rfl: 3    rosuvastatin (CRESTOR) 40 MG Tab, TAKE 1 TABLET (40 MG TOTAL) BY MOUTH EVERY EVENING, Disp: 90 tablet, Rfl: 3    tobramycin sulfate 0.3% (TOBREX) 0.3 % ophthalmic solution, Place 1 drop into the right eye 4 (four) times daily. Start after eye surgery., Disp: 5 mL, Rfl: 0    TRINTELLIX 10 mg Tab, Take 1 tablet by mouth nightly., Disp: , Rfl:   No current facility-administered medications for this  "visit.    Facility-Administered Medications Ordered in Other Visits:     bupivacaine (PF) 0.25% (2.5 mg/ml) injection, , , PRN, Galo Clancy MD, 6 mL at 02/19/19 1314    lidocaine (PF) 10 mg/ml (1%) injection, , , PRN, Galo Clancy MD, 9 mL at 02/19/19 1304    lidocaine (PF) 20 mg/ml (2%) injection, , , PRN, Galo Clancy MD, 6 mL at 02/19/19 1310    methylPREDNISolone acetate injection, , , PRN, Galo Clancy MD, 80 mg at 02/19/19 1314    Review of Systems   Constitutional: Negative for unexpected weight change.   HENT: Negative for trouble swallowing.    Eyes: Negative for visual disturbance.   Respiratory: Negative for shortness of breath.    Cardiovascular: Negative for chest pain, palpitations and leg swelling.   Gastrointestinal: Negative for blood in stool.   Genitourinary: Negative for hematuria.   Skin: Negative for rash.   Allergic/Immunologic: Negative for immunocompromised state.   Neurological: Negative for headaches.   Hematological: Does not bruise/bleed easily.   Psychiatric/Behavioral: Negative for agitation. The patient is not nervous/anxious.        Objective:      /68 (BP Location: Left arm, Patient Position: Sitting, BP Method: Small (Manual))   Pulse 61   Temp 96 °F (35.6 °C) (Oral)   Ht 5' 7" (1.702 m)   Wt 45 kg (99 lb 3.3 oz)   SpO2 99%   BMI 15.54 kg/m²   Physical Exam  Constitutional:       Appearance: She is well-developed.   Eyes:      Pupils: Pupils are equal, round, and reactive to light.   Cardiovascular:      Rate and Rhythm: Normal rate and regular rhythm.      Heart sounds: Normal heart sounds.   Pulmonary:      Effort: Pulmonary effort is normal.      Breath sounds: Normal breath sounds.   Abdominal:      General: Bowel sounds are normal.      Palpations: Abdomen is soft.   Musculoskeletal:         General: Normal range of motion.      Cervical back: Normal range of motion.   Skin:     General: Skin is warm and dry.   Neurological:      Mental Status: She is alert and " oriented to person, place, and time.   Psychiatric:         Behavior: Behavior normal.         Thought Content: Thought content normal.         Judgment: Judgment normal.         Assessment:       1. Preop examination    2. Hyphema, right eye    3. Paroxysmal atrial fibrillation, ablations X 3 1995, 1997, 9/2017, CHADS-VAS score 5, HAS-BLED score 5     4. JOSE (generalized anxiety disorder)    5. Cognitive impairment    6. Recurrent major depressive disorder, in partial remission    7. Mild intermittent asthma without complication        Plan:       Preop examination  -     CBC W/ AUTO DIFFERENTIAL; Future; Expected date: 07/22/2022  -     COMPREHENSIVE METABOLIC PANEL; Future; Expected date: 07/22/2022  -     APTT; Future; Expected date: 07/22/2022  -     PROTIME-INR; Future; Expected date: 07/22/2022  Labs reviewed: are stable  Patient has a pacemaker-cardiac device checked on 5/16/22  Patient is medically stable  for right eye wash on 7/27/22 by .  Sure chat with :Ok to hold Eliquis for 2 days prior to surgery  Hyphema, right eye  -     CBC W/ AUTO DIFFERENTIAL; Future; Expected date: 07/22/2022  -     COMPREHENSIVE METABOLIC PANEL; Future; Expected date: 07/22/2022  -     APTT; Future; Expected date: 07/22/2022  -     PROTIME-INR; Future; Expected date: 07/22/2022  Patient has a pacemaker-cardiac device checked on 5/16/22  Patient is medically stable  for right eye wash on 7/27/22 by .  Sure chat with :Ok to hold Eliquis for 2 days prior to surgery    Paroxysmal atrial fibrillation, ablations X 3 1995, 1997, 9/2017, CHADS-VAS score 5, HAS-BLED score 5   Stable, on  Eliquis  JOSE (generalized anxiety disorder)  Stable, continue psych follow up    Cognitive impairment  Stable,  at office visit  Recurrent major depressive disorder, in partial remission  Stable, continue psych follow up  On Trintellix  Mild intermittent asthma without complication  Stable, continue managemetnt     continue pulmonology follow up    1. Preoperative workup as follows ECG, hemoglobin, hematocrit, electrolytes, creatinine, glucose.  2. Change in medication regimen before surgery: AVOID NSAIDS FOR 5-7 DAYS PRIOR TO PROCEDURE.  Hold aspirin for 7 days and  Eliquis  2 day prior to procedure. May be resumed 1 day after procedure.  Otherwise continue medication regimen including morning of surgery, with sip of water.  3. Prophylaxis for cardiac events with perioperative beta-blockers: not indicated.  4. Invasive hemodynamic monitoring perioperatively: at the discretion of anesthesiologist.  5. Deep vein thrombosis prophylaxis postoperatively:regimen to be chosen by surgical team.  6. Surveillance for postoperative MI with ECG immediately postoperatively and on postoperative days 1 and 2 AND troponin levels 24 hours postoperatively and on day 4 or hospital discharge (whichever comes first): at the discretion of anesthesiologist

## 2022-07-23 ENCOUNTER — LAB VISIT (OUTPATIENT)
Dept: LAB | Facility: HOSPITAL | Age: 77
End: 2022-07-23
Attending: NURSE PRACTITIONER
Payer: MEDICARE

## 2022-07-23 DIAGNOSIS — Z01.818 PREOP EXAMINATION: ICD-10-CM

## 2022-07-23 LAB
ALBUMIN SERPL BCP-MCNC: 3.8 G/DL (ref 3.5–5.2)
ALP SERPL-CCNC: 86 U/L (ref 55–135)
ALT SERPL W/O P-5'-P-CCNC: 61 U/L (ref 10–44)
ANION GAP SERPL CALC-SCNC: 10 MMOL/L (ref 8–16)
AST SERPL-CCNC: 63 U/L (ref 10–40)
BASOPHILS # BLD AUTO: 0.05 K/UL (ref 0–0.2)
BASOPHILS NFR BLD: 0.6 % (ref 0–1.9)
BILIRUB SERPL-MCNC: 1.6 MG/DL (ref 0.1–1)
BUN SERPL-MCNC: 18 MG/DL (ref 8–23)
CALCIUM SERPL-MCNC: 9.5 MG/DL (ref 8.7–10.5)
CHLORIDE SERPL-SCNC: 108 MMOL/L (ref 95–110)
CO2 SERPL-SCNC: 24 MMOL/L (ref 23–29)
CREAT SERPL-MCNC: 0.9 MG/DL (ref 0.5–1.4)
DIFFERENTIAL METHOD: ABNORMAL
EOSINOPHIL # BLD AUTO: 0.3 K/UL (ref 0–0.5)
EOSINOPHIL NFR BLD: 3.5 % (ref 0–8)
ERYTHROCYTE [DISTWIDTH] IN BLOOD BY AUTOMATED COUNT: 14.1 % (ref 11.5–14.5)
EST. GFR  (AFRICAN AMERICAN): >60 ML/MIN/1.73 M^2
EST. GFR  (NON AFRICAN AMERICAN): >60 ML/MIN/1.73 M^2
GLUCOSE SERPL-MCNC: 133 MG/DL (ref 70–110)
HCT VFR BLD AUTO: 44 % (ref 37–48.5)
HGB BLD-MCNC: 13.9 G/DL (ref 12–16)
IMM GRANULOCYTES # BLD AUTO: 0.04 K/UL (ref 0–0.04)
IMM GRANULOCYTES NFR BLD AUTO: 0.5 % (ref 0–0.5)
LYMPHOCYTES # BLD AUTO: 1 K/UL (ref 1–4.8)
LYMPHOCYTES NFR BLD: 11.5 % (ref 18–48)
MCH RBC QN AUTO: 31.7 PG (ref 27–31)
MCHC RBC AUTO-ENTMCNC: 31.6 G/DL (ref 32–36)
MCV RBC AUTO: 100 FL (ref 82–98)
MONOCYTES # BLD AUTO: 0.7 K/UL (ref 0.3–1)
MONOCYTES NFR BLD: 8.1 % (ref 4–15)
NEUTROPHILS # BLD AUTO: 6.7 K/UL (ref 1.8–7.7)
NEUTROPHILS NFR BLD: 75.8 % (ref 38–73)
NRBC BLD-RTO: 0 /100 WBC
PLATELET # BLD AUTO: 214 K/UL (ref 150–450)
PMV BLD AUTO: 10.1 FL (ref 9.2–12.9)
POTASSIUM SERPL-SCNC: 4.1 MMOL/L (ref 3.5–5.1)
PROT SERPL-MCNC: 6.5 G/DL (ref 6–8.4)
RBC # BLD AUTO: 4.39 M/UL (ref 4–5.4)
SODIUM SERPL-SCNC: 142 MMOL/L (ref 136–145)
WBC # BLD AUTO: 8.78 K/UL (ref 3.9–12.7)

## 2022-07-23 PROCEDURE — 85730 THROMBOPLASTIN TIME PARTIAL: CPT | Performed by: NURSE PRACTITIONER

## 2022-07-23 PROCEDURE — 80053 COMPREHEN METABOLIC PANEL: CPT | Performed by: NURSE PRACTITIONER

## 2022-07-23 PROCEDURE — 36415 COLL VENOUS BLD VENIPUNCTURE: CPT | Mod: PO | Performed by: NURSE PRACTITIONER

## 2022-07-23 PROCEDURE — 85025 COMPLETE CBC W/AUTO DIFF WBC: CPT | Performed by: NURSE PRACTITIONER

## 2022-07-23 PROCEDURE — 85610 PROTHROMBIN TIME: CPT | Performed by: NURSE PRACTITIONER

## 2022-07-25 LAB
APTT BLDCRRT: 29 SEC (ref 21–32)
INR PPP: 1.3 (ref 0.8–1.2)
PROTHROMBIN TIME: 12.8 SEC (ref 9–12.5)

## 2022-07-25 NOTE — DISCHARGE INSTRUCTIONS
PLEASE MAKE SURE YOU HAVE ALL OF YOUR BELONGINGS BEFORE LEAVING     Discharge Instructions     USE DROPS AS INSTRUCTED:     PREDNISOLONE  ONE DROP 4 TIMES A DAY  Moxifloxacin ONE DROP 4 TIMES A DAY  Atropine one drop 2 times a day  WAIT 2 MINUTES BETWEEN DROPS     BRING ALL EYE DROPS TO YOUR CLINIC VISITS    Wear sunglasses during the day  Do not sleep on the affected side and wear eye shield while sleeping for 2 weeks.  No exercise or lifting greater than 10 lbs for 2 weeks.  Try not to cough. If coughing a lot, take a cough suppressent  Do not bend over for 2 weeks.  Keep water it of your eye for 2 weeks.             Wear sunglasses for comfort as needed.  It is normal to feel a slight irritation, like there is an eyelash in the eye that had surgery.   You may take Tylenol for discomfort but if pain intensifies , call Dr Jessie Meeks until 07/27  If you use eye drops for glaucoma you may continue to use them.      After Surgery:  Always be aware that any surgery can cause these symptoms:    Pain- Medication can be prescribed for pain to decrease your pain but may not completely take your pain away.  Over the Counter pain medicine my be enough and you can always use Ice and rest to help ease pain.    Bleeding- a little bleeding after a surgery is usually within normal.  If there is a lot of blood you need to notify your MD.  Emergency treatments of bleeding are cold application, elevation of the bleeding site and compression.    Infection- Infection after surgery is NOT a normal occurrence.  Signs of infection are fever, swelling, hot to touch the incision.  If this occurs notify your MD immediately.    Nausea- this can be common after a surgery especially if you have had anesthesia medicine or are taking pain medicine.  Staying on clear liquids, bland foods, gingerale, or over the counter anti nausea medicines can help.  If you vomit more than once, notify your MD.  Anti Nausea medicines can be prescribed.

## 2022-07-26 ENCOUNTER — ANESTHESIA EVENT (OUTPATIENT)
Dept: SURGERY | Facility: AMBULARY SURGERY CENTER | Age: 77
End: 2022-07-26
Payer: MEDICARE

## 2022-07-27 ENCOUNTER — ANESTHESIA (OUTPATIENT)
Dept: SURGERY | Facility: AMBULARY SURGERY CENTER | Age: 77
End: 2022-07-27
Payer: MEDICARE

## 2022-07-27 ENCOUNTER — HOSPITAL ENCOUNTER (OUTPATIENT)
Facility: AMBULARY SURGERY CENTER | Age: 77
Discharge: HOME | End: 2022-07-27
Attending: OPHTHALMOLOGY | Admitting: OPHTHALMOLOGY
Payer: MEDICARE

## 2022-07-27 DIAGNOSIS — H21.01 HYPHEMA, RIGHT EYE: ICD-10-CM

## 2022-07-27 PROCEDURE — 67010 PARTIAL REMOVAL OF EYE FLUID: CPT | Performed by: OPHTHALMOLOGY

## 2022-07-27 PROCEDURE — 67005 PR PART REMV VITREOUS,ANT APPRCH: ICD-10-PCS | Mod: 51,RT,, | Performed by: OPHTHALMOLOGY

## 2022-07-27 PROCEDURE — D9220A PRA ANESTHESIA: Mod: CRNA,,, | Performed by: NURSE ANESTHETIST, CERTIFIED REGISTERED

## 2022-07-27 PROCEDURE — 65815 DRAINAGE OF EYE: CPT | Mod: RT,,, | Performed by: OPHTHALMOLOGY

## 2022-07-27 PROCEDURE — D9220A PRA ANESTHESIA: ICD-10-PCS | Mod: ANES,,, | Performed by: ANESTHESIOLOGY

## 2022-07-27 PROCEDURE — D9220A PRA ANESTHESIA: ICD-10-PCS | Mod: CRNA,,, | Performed by: NURSE ANESTHETIST, CERTIFIED REGISTERED

## 2022-07-27 PROCEDURE — 67005 PARTIAL REMOVAL OF EYE FLUID: CPT | Mod: 51,RT,, | Performed by: OPHTHALMOLOGY

## 2022-07-27 PROCEDURE — 65815 PR DRAIN ANT CHMBR,REMV BLOOD: ICD-10-PCS | Mod: RT,,, | Performed by: OPHTHALMOLOGY

## 2022-07-27 PROCEDURE — D9220A PRA ANESTHESIA: Mod: ANES,,, | Performed by: ANESTHESIOLOGY

## 2022-07-27 PROCEDURE — 65815 DRAINAGE OF EYE: CPT | Performed by: OPHTHALMOLOGY

## 2022-07-27 PROCEDURE — 67005 PARTIAL REMOVAL OF EYE FLUID: CPT | Performed by: OPHTHALMOLOGY

## 2022-07-27 RX ORDER — SODIUM CHLORIDE, SODIUM LACTATE, POTASSIUM CHLORIDE, CALCIUM CHLORIDE 600; 310; 30; 20 MG/100ML; MG/100ML; MG/100ML; MG/100ML
INJECTION, SOLUTION INTRAVENOUS CONTINUOUS
Status: DISCONTINUED | OUTPATIENT
Start: 2022-07-27 | End: 2022-07-27 | Stop reason: HOSPADM

## 2022-07-27 RX ORDER — MIDAZOLAM HYDROCHLORIDE 1 MG/ML
INJECTION INTRAMUSCULAR; INTRAVENOUS
Status: DISCONTINUED | OUTPATIENT
Start: 2022-07-27 | End: 2022-07-27

## 2022-07-27 RX ORDER — EPINEPHRINE 1 MG/ML
INJECTION, SOLUTION, CONCENTRATE INTRAVENOUS
Status: DISCONTINUED | OUTPATIENT
Start: 2022-07-27 | End: 2022-07-27 | Stop reason: HOSPADM

## 2022-07-27 RX ORDER — PROPARACAINE HYDROCHLORIDE 5 MG/ML
1 SOLUTION/ DROPS OPHTHALMIC
Status: DISCONTINUED | OUTPATIENT
Start: 2022-07-27 | End: 2022-07-27 | Stop reason: HOSPADM

## 2022-07-27 RX ORDER — ONDANSETRON 2 MG/ML
INJECTION INTRAMUSCULAR; INTRAVENOUS
Status: DISCONTINUED | OUTPATIENT
Start: 2022-07-27 | End: 2022-07-27

## 2022-07-27 RX ORDER — TETRACAINE HYDROCHLORIDE 5 MG/ML
SOLUTION OPHTHALMIC
Status: DISCONTINUED | OUTPATIENT
Start: 2022-07-27 | End: 2022-07-27 | Stop reason: HOSPADM

## 2022-07-27 RX ORDER — LIDOCAINE HYDROCHLORIDE 40 MG/ML
INJECTION, SOLUTION RETROBULBAR
Status: DISCONTINUED | OUTPATIENT
Start: 2022-07-27 | End: 2022-07-27 | Stop reason: HOSPADM

## 2022-07-27 RX ORDER — SODIUM CHLORIDE 9 MG/ML
INJECTION, SOLUTION INTRAVENOUS CONTINUOUS
Status: DISCONTINUED | OUTPATIENT
Start: 2022-07-27 | End: 2022-07-27 | Stop reason: HOSPADM

## 2022-07-27 RX ADMIN — ONDANSETRON 4 MG: 2 INJECTION INTRAMUSCULAR; INTRAVENOUS at 10:07

## 2022-07-27 RX ADMIN — PROPARACAINE HYDROCHLORIDE 1 DROP: 5 SOLUTION/ DROPS OPHTHALMIC at 10:07

## 2022-07-27 RX ADMIN — SODIUM CHLORIDE, SODIUM LACTATE, POTASSIUM CHLORIDE, CALCIUM CHLORIDE: 600; 310; 30; 20 INJECTION, SOLUTION INTRAVENOUS at 08:07

## 2022-07-27 RX ADMIN — MIDAZOLAM HYDROCHLORIDE 0.5 MG: 1 INJECTION INTRAMUSCULAR; INTRAVENOUS at 10:07

## 2022-07-27 NOTE — TRANSFER OF CARE
"Anesthesia Transfer of Care Note    Patient: Ayla Dela Cruz    Procedure(s) Performed: Procedure(s) (LRB):  Anterior chamber washout, possible IOL removal (Right)  VITRECTOMY, ANTERIOR APPROACH (Right)    Patient location: PACU    Anesthesia Type: MAC    Transport from OR: Transported from OR on room air with adequate spontaneous ventilation    Post pain: adequate analgesia    Post assessment: no apparent anesthetic complications and tolerated procedure well    Post vital signs: stable    Level of consciousness: awake, alert and oriented    Nausea/Vomiting: no nausea/vomiting    Complications: none    Transfer of care protocol was followed      Last vitals:   Visit Vitals  BP (!) 116/54 (BP Location: Left arm, Patient Position: Lying)   Pulse 69   Temp 36.5 °C (97.7 °F) (Tympanic)   Resp 18   Ht 5' 7" (1.702 m)   Wt 45 kg (99 lb 3.3 oz)   SpO2 96%   Breastfeeding No   BMI 15.54 kg/m²     "

## 2022-07-27 NOTE — DISCHARGE SUMMARY
[facility]  Discharge Note  Short Stay    Procedure(s) (LRB):  Anterior chamber washout, possible IOL removal (Right)  VITRECTOMY, ANTERIOR APPROACH (Right)    OUTCOME: Patient tolerated treatment/procedure well without complication and is now ready for discharge.    DISPOSITION: Home or Self Care    FINAL DIAGNOSIS:  hyphema    FOLLOWUP: In clinic    DISCHARGE INSTRUCTIONS:  No discharge procedures on file.     TIME SPENT ON DISCHARGE: 10 minutes

## 2022-07-27 NOTE — H&P
History    Chief complaint:  Blurry vision, bleeding, right Eye    Present Ilness/Diagnosis: Hyphema, malpositioned IOL, right Eye    ROS: +Eyes, otherwise no significant changes    Past Medical History: refer to chart    Family History/Social History: refer to chart    Allergies:   Review of patient's allergies indicates:   Allergen Reactions    Gabapentin Hallucinations    Xarelto [rivaroxaban] Rash    Bactrim [sulfamethoxazole-trimethoprim] Other (See Comments)     Upset stomach, dry heaves, confusion    Afrin (pseudoephedrine)     Amoxicillin-pot clavulanate      Other reaction(s): Mental Status Change    Atorvastatin      Other reaction(s): Joint pain    Baclofen     Baclofen (bulk) Nausea And Vomiting    Ciprofloxacin     Ciprofloxacin (bulk)     Decongest tabs      Other reaction(s): increased heart rate    Decongestant [pseudoephedrine hcl]     Erythromycin Other (See Comments)    Flecainide Hives     And SOB. No reaction to Lidocaine     Fluoxetine      Other reaction(s): heart palpitations  Other reaction(s): anxiety    Lisinopril Other (See Comments)     cough    Losartan Other (See Comments)     Hypotension with lightheadedness    Morphine Other (See Comments)     confusion    Tramadol Other (See Comments)     SOB, low BP    Venlafaxine     Venlafaxine analogues      Changes in BP and increases heart rate       Afrin [oxymetazoline] Palpitations    Caffeine Palpitations    Dabigatran etexilate Rash    Tizanidine Anxiety     dizziness       Current Medications: see medcard      Physical Exam    BP: Vital signs stable  General: No apparent distress  HEENT: hyphema  Lungs: adequate respirations  Heart: + pulses  Abdomen: soft  Rectal/pelvic: deferred    Labs: Labs Reviewed    Lab Results   Component Value Date    WBC 8.78 07/23/2022    HGB 13.9 07/23/2022    HCT 44.0 07/23/2022     (H) 07/23/2022     07/23/2022           CMP  Sodium   Date Value Ref Range Status    07/23/2022 142 136 - 145 mmol/L Final     Potassium   Date Value Ref Range Status   07/23/2022 4.1 3.5 - 5.1 mmol/L Final     Chloride   Date Value Ref Range Status   07/23/2022 108 95 - 110 mmol/L Final     CO2   Date Value Ref Range Status   07/23/2022 24 23 - 29 mmol/L Final     Glucose   Date Value Ref Range Status   07/23/2022 133 (H) 70 - 110 mg/dL Final     BUN   Date Value Ref Range Status   07/23/2022 18 8 - 23 mg/dL Final     Creatinine   Date Value Ref Range Status   07/23/2022 0.9 0.5 - 1.4 mg/dL Final   09/24/2012 0.6 0.2 - 1.4 mg/dl Final     Calcium   Date Value Ref Range Status   07/23/2022 9.5 8.7 - 10.5 mg/dL Final   09/24/2012 9.9 8.6 - 10.2 mg/dl Final     Total Protein   Date Value Ref Range Status   07/23/2022 6.5 6.0 - 8.4 g/dL Final     Albumin   Date Value Ref Range Status   07/23/2022 3.8 3.5 - 5.2 g/dL Final     Total Bilirubin   Date Value Ref Range Status   07/23/2022 1.6 (H) 0.1 - 1.0 mg/dL Final     Comment:     For infants and newborns, interpretation of results should be based  on gestational age, weight and in agreement with clinical  observations.    Premature Infant recommended reference ranges:  Up to 24 hours.............<8.0 mg/dL  Up to 48 hours............<12.0 mg/dL  3-5 days..................<15.0 mg/dL  6-29 days.................<15.0 mg/dL       Alkaline Phosphatase   Date Value Ref Range Status   07/23/2022 86 55 - 135 U/L Final   09/24/2012 63 23 - 119 UNIT/L Final     AST   Date Value Ref Range Status   07/23/2022 63 (H) 10 - 40 U/L Final   09/24/2012 26 10 - 30 UNIT/L Final     ALT   Date Value Ref Range Status   07/23/2022 61 (H) 10 - 44 U/L Final     Anion Gap   Date Value Ref Range Status   07/23/2022 10 8 - 16 mmol/L Final   09/24/2012 12 5 - 15 meq/L Final     eGFR if    Date Value Ref Range Status   07/23/2022 >60.0 >60 mL/min/1.73 m^2 Final     eGFR if non    Date Value Ref Range Status   07/23/2022 >60.0 >60 mL/min/1.73 m^2  Final     Comment:     Calculation used to obtain the estimated glomerular filtration  rate (eGFR) is the CKD-EPI equation.          The patient has been cleared for surgery in an ambulatory surgery facility.     Impression: Hyphema, malpositioned intraocular lens, right Eye    Plan: Anterior chamber washout, possible intraocular lens removal, right Eye

## 2022-07-27 NOTE — ANESTHESIA PREPROCEDURE EVALUATION
07/27/2022  Ayla Dela Cruz is a 77 y.o., female.      Pre-op Assessment    I have reviewed the Patient Summary Reports.     I have reviewed the Nursing Notes. I have reviewed the NPO Status.   I have reviewed the Medications.     Review of Systems  Anesthesia Hx:  Denies Family Hx of Anesthesia complications.   Denies Personal Hx of Anesthesia complications.   Cardiovascular:   Hypertension Dysrhythmias CHF ECG has been reviewed. AICD   Pulmonary:   Pneumonia Asthma moderate    Musculoskeletal:  Spine Disorders: cervical    Psych:   Psychiatric History          Physical Exam  General: Cooperative, Lethargic and Cachexia  Frail, elderly, psychomotor retardation  Airway:  Mallampati: III   Mouth Opening: < 3 cm  TM Distance: 4 - 6 cm  Neck ROM: Extension Decreased, Flexion Decreased, Left Lateral Motion Decreased, Right Lateral Motion Decreased    Chest/Lungs:  Normal Respiratory Rate    Heart:  Rate: Normal  Rhythm: Regular Rhythm        Anesthesia Plan  Type of Anesthesia, risks & benefits discussed:    Anesthesia Type: MAC  Intra-op Monitoring Plan: Standard ASA Monitors  Post Op Pain Control Plan: multimodal analgesia  Informed Consent: Informed consent signed with the Patient and all parties understand the risks and agree with anesthesia plan.  All questions answered.   ASA Score: 4    Ready For Surgery From Anesthesia Perspective.     .

## 2022-07-27 NOTE — ANESTHESIA POSTPROCEDURE EVALUATION
Anesthesia Post Evaluation    Patient: Ayla Dela Cruz    Procedure(s) Performed: Procedure(s) (LRB):  Anterior chamber washout, possible IOL removal (Right)  VITRECTOMY, ANTERIOR APPROACH (Right)    Final Anesthesia Type: MAC      Patient location during evaluation: PACU  Patient participation: Yes- Able to Participate  Level of consciousness: awake and alert  Post-procedure vital signs: reviewed and stable  Pain management: adequate  Airway patency: patent    PONV status at discharge: No PONV  Anesthetic complications: no      Cardiovascular status: blood pressure returned to baseline  Respiratory status: unassisted  Hydration status: euvolemic  Follow-up not needed.          Vitals Value Taken Time   /68 07/27/22 1128   Temp 36.6 °C (97.9 °F) 07/27/22 1053   Pulse 65 07/27/22 1133   Resp 16 07/27/22 1053   SpO2 98 % 07/27/22 1131   Vitals shown include unvalidated device data.      No case tracking events are documented in the log.      Pain/Chalino Score: Modified Chalino Score: 20 (7/27/2022 11:10 AM)

## 2022-07-27 NOTE — OP NOTE
Operative Date:  07/27/2022    Discharge Date:  07/27/2022    Report Title: Operative Note    SURGEON: Daniel Tan MD    ASSISTANT: None    PREOPERATIVE DIAGNOSIS: Hyphema, Right Eye    POSTOPERATIVE DIAGNOSIS: same    PROCEDURE PERFORMED: Anterior chamber washout, anterior vitrectomy, lysis of membranes, Right Eye    IMPLANTS: none    ANESTHESIA:  Topical with MAC    COMPLICATIONS: None    ESTIMATED BLOOD LOSS: Minimal    PROCEDURE: The patient was brought to the operating room, time out was performed and implant checked.  The patient was given light sedation, and topical anesthesia was instilled in the right eye.  The right eye was prepped and draped in the usual fashion for eye surgery and lid speculum used to retract the eyelid. The eyelashes were secluded within the drape.  A paracentesis was made superiorly and inferiorly with a sideport blade.  Those incisions were widened to accommodate the larger gauge handpieces. Epishugarcaine was injected in the anterior chamber.  Using bimanual irrigation/aspiration, the blood was washed out of the anterior chamber.   Using the aspiration port face, an adherent membrane was peeled away from the anterior surface of the IOL.  Further inspection of the eye revealed inferior corneal blood staining and an intraocular lens that had areas of iris capture.  A strand of vitreous was noted to be in the anterior chamber.  Anterior vitrectomy was performed to remove the vitreous and it was used to augment the peripheral iridectomy superiorly.  It was decided to leave the intraocular lens in position without removal.      The wounds were hydrated until watertight.  The anterior chamber was left deep without hyphema at a physiologic IOP.    The wounds were rechecked and no leakage was noted.  The speculum was removed. Topical antibiotic was applied to the eye and shield was placed over the eye. The patient tolerated the procedure well and left the operating room in good  condition.

## 2022-07-28 ENCOUNTER — OFFICE VISIT (OUTPATIENT)
Dept: OPHTHALMOLOGY | Facility: CLINIC | Age: 77
End: 2022-07-28
Payer: MEDICARE

## 2022-07-28 VITALS
HEART RATE: 61 BPM | OXYGEN SATURATION: 98 % | WEIGHT: 99.19 LBS | SYSTOLIC BLOOD PRESSURE: 136 MMHG | RESPIRATION RATE: 16 BRPM | DIASTOLIC BLOOD PRESSURE: 68 MMHG | TEMPERATURE: 98 F | BODY MASS INDEX: 15.57 KG/M2 | HEIGHT: 67 IN

## 2022-07-28 DIAGNOSIS — Z98.890 STATUS POST EYE SURGERY: Primary | ICD-10-CM

## 2022-07-28 PROCEDURE — 1101F PT FALLS ASSESS-DOCD LE1/YR: CPT | Mod: CPTII,S$GLB,, | Performed by: OPHTHALMOLOGY

## 2022-07-28 PROCEDURE — 1159F PR MEDICATION LIST DOCUMENTED IN MEDICAL RECORD: ICD-10-PCS | Mod: CPTII,S$GLB,, | Performed by: OPHTHALMOLOGY

## 2022-07-28 PROCEDURE — 1101F PR PT FALLS ASSESS DOC 0-1 FALLS W/OUT INJ PAST YR: ICD-10-PCS | Mod: CPTII,S$GLB,, | Performed by: OPHTHALMOLOGY

## 2022-07-28 PROCEDURE — 1160F PR REVIEW ALL MEDS BY PRESCRIBER/CLIN PHARMACIST DOCUMENTED: ICD-10-PCS | Mod: CPTII,S$GLB,, | Performed by: OPHTHALMOLOGY

## 2022-07-28 PROCEDURE — 99024 PR POST-OP FOLLOW-UP VISIT: ICD-10-PCS | Mod: S$GLB,,, | Performed by: OPHTHALMOLOGY

## 2022-07-28 PROCEDURE — 1157F ADVNC CARE PLAN IN RCRD: CPT | Mod: CPTII,S$GLB,, | Performed by: OPHTHALMOLOGY

## 2022-07-28 PROCEDURE — 1157F PR ADVANCE CARE PLAN OR EQUIV PRESENT IN MEDICAL RECORD: ICD-10-PCS | Mod: CPTII,S$GLB,, | Performed by: OPHTHALMOLOGY

## 2022-07-28 PROCEDURE — 99024 POSTOP FOLLOW-UP VISIT: CPT | Mod: S$GLB,,, | Performed by: OPHTHALMOLOGY

## 2022-07-28 PROCEDURE — 3288F PR FALLS RISK ASSESSMENT DOCUMENTED: ICD-10-PCS | Mod: CPTII,S$GLB,, | Performed by: OPHTHALMOLOGY

## 2022-07-28 PROCEDURE — G0179 PR HOME HEALTH MD RECERTIFICATION: ICD-10-PCS | Mod: ,,, | Performed by: FAMILY MEDICINE

## 2022-07-28 PROCEDURE — 1126F PR PAIN SEVERITY QUANTIFIED, NO PAIN PRESENT: ICD-10-PCS | Mod: CPTII,S$GLB,, | Performed by: OPHTHALMOLOGY

## 2022-07-28 PROCEDURE — 1126F AMNT PAIN NOTED NONE PRSNT: CPT | Mod: CPTII,S$GLB,, | Performed by: OPHTHALMOLOGY

## 2022-07-28 PROCEDURE — 99999 PR PBB SHADOW E&M-EST. PATIENT-LVL II: ICD-10-PCS | Mod: PBBFAC,,, | Performed by: OPHTHALMOLOGY

## 2022-07-28 PROCEDURE — 1159F MED LIST DOCD IN RCRD: CPT | Mod: CPTII,S$GLB,, | Performed by: OPHTHALMOLOGY

## 2022-07-28 PROCEDURE — 3288F FALL RISK ASSESSMENT DOCD: CPT | Mod: CPTII,S$GLB,, | Performed by: OPHTHALMOLOGY

## 2022-07-28 PROCEDURE — G0179 MD RECERTIFICATION HHA PT: HCPCS | Mod: ,,, | Performed by: FAMILY MEDICINE

## 2022-07-28 PROCEDURE — 99999 PR PBB SHADOW E&M-EST. PATIENT-LVL II: CPT | Mod: PBBFAC,,, | Performed by: OPHTHALMOLOGY

## 2022-07-28 PROCEDURE — 1160F RVW MEDS BY RX/DR IN RCRD: CPT | Mod: CPTII,S$GLB,, | Performed by: OPHTHALMOLOGY

## 2022-07-28 NOTE — PROGRESS NOTES
HPI     Pt here for 1 day po AC wash out OD.     States no pain in OD today. Denies F/F.     Gtts:   PF OD QID   ATROPINE OD BID   TOBRA OD QID     Has not had since procedure.   COSOPT   BRIM    Last edited by Naima Lynch on 7/28/2022  8:12 AM. (History)            Assessment /Plan     For exam results, see Encounter Report.    Status post eye surgery      POD1 AC washout  Hyphema gone  IOP excellent  Erasto negative at wounds  VA improved back to baseline    Hold glaucoma drops  tobra QID  pred QID  Atropine BID    F/u 1 week

## 2022-07-29 ENCOUNTER — OFFICE VISIT (OUTPATIENT)
Dept: CARDIOLOGY | Facility: CLINIC | Age: 77
End: 2022-07-29
Payer: MEDICARE

## 2022-07-29 DIAGNOSIS — R73.09 ELEVATED HEMOGLOBIN A1C: ICD-10-CM

## 2022-07-29 DIAGNOSIS — F50.00 ANOREXIA NERVOSA: ICD-10-CM

## 2022-07-29 DIAGNOSIS — I48.0 PAROXYSMAL ATRIAL FIBRILLATION: Primary | ICD-10-CM

## 2022-07-29 DIAGNOSIS — R80.9 MICROALBUMINURIA: ICD-10-CM

## 2022-07-29 DIAGNOSIS — E86.0 DEHYDRATION, MODERATE: ICD-10-CM

## 2022-07-29 DIAGNOSIS — Z86.73 H/O: CVA (CEREBROVASCULAR ACCIDENT): ICD-10-CM

## 2022-07-29 DIAGNOSIS — Z86.79 S/P ABLATION OF ATRIAL FLUTTER: ICD-10-CM

## 2022-07-29 DIAGNOSIS — E78.2 MIXED HYPERLIPIDEMIA: ICD-10-CM

## 2022-07-29 DIAGNOSIS — E46 HYPOALBUMINEMIA DUE TO PROTEIN-CALORIE MALNUTRITION: ICD-10-CM

## 2022-07-29 DIAGNOSIS — Z91.89 AT RISK FOR CARDIOVASCULAR EVENT: ICD-10-CM

## 2022-07-29 DIAGNOSIS — I11.0 HYPERTENSIVE LEFT VENTRICULAR HYPERTROPHY WITH HEART FAILURE: ICD-10-CM

## 2022-07-29 DIAGNOSIS — I27.20 PULMONARY HYPERTENSION: ICD-10-CM

## 2022-07-29 DIAGNOSIS — Z91.89 SEDENTARY LIFESTYLE: ICD-10-CM

## 2022-07-29 DIAGNOSIS — Z79.01 LONG TERM (CURRENT) USE OF ANTICOAGULANTS: ICD-10-CM

## 2022-07-29 DIAGNOSIS — R53.82 CHRONIC FATIGUE: ICD-10-CM

## 2022-07-29 DIAGNOSIS — Z78.9 MEDICATION INTOLERANCE: ICD-10-CM

## 2022-07-29 DIAGNOSIS — R54 FRAIL ELDERLY: ICD-10-CM

## 2022-07-29 DIAGNOSIS — J84.9 INTERSTITIAL LUNG DISEASE: ICD-10-CM

## 2022-07-29 DIAGNOSIS — Z98.890 S/P ABLATION OF ATRIAL FLUTTER: ICD-10-CM

## 2022-07-29 DIAGNOSIS — Z79.01 ANTICOAGULANT LONG-TERM USE: ICD-10-CM

## 2022-07-29 DIAGNOSIS — Z86.718 HISTORY OF DVT (DEEP VEIN THROMBOSIS): ICD-10-CM

## 2022-07-29 DIAGNOSIS — E88.09 HYPOALBUMINEMIA DUE TO PROTEIN-CALORIE MALNUTRITION: ICD-10-CM

## 2022-07-29 DIAGNOSIS — I50.32 CHRONIC DIASTOLIC HEART FAILURE: ICD-10-CM

## 2022-07-29 DIAGNOSIS — R79.89 ELEVATED BRAIN NATRIURETIC PEPTIDE (BNP) LEVEL: ICD-10-CM

## 2022-07-29 DIAGNOSIS — Z95.0 CARDIAC PACEMAKER IN SITU: ICD-10-CM

## 2022-07-29 DIAGNOSIS — I10 PRIMARY HYPERTENSION: ICD-10-CM

## 2022-07-29 DIAGNOSIS — D50.0 IRON DEFICIENCY ANEMIA DUE TO CHRONIC BLOOD LOSS: ICD-10-CM

## 2022-07-29 DIAGNOSIS — R79.89 ELEVATED LFTS: ICD-10-CM

## 2022-07-29 DIAGNOSIS — H21.01 HYPHEMA, RIGHT EYE: ICD-10-CM

## 2022-07-29 PROBLEM — R74.01 ELEVATED AST (SGOT): Status: RESOLVED | Noted: 2020-01-10 | Resolved: 2022-07-29

## 2022-07-29 PROCEDURE — 1157F ADVNC CARE PLAN IN RCRD: CPT | Mod: CPTII,S$GLB,, | Performed by: INTERNAL MEDICINE

## 2022-07-29 PROCEDURE — 93000 EKG 12-LEAD: ICD-10-PCS | Mod: S$GLB,,, | Performed by: INTERNAL MEDICINE

## 2022-07-29 PROCEDURE — 93000 ELECTROCARDIOGRAM COMPLETE: CPT | Mod: S$GLB,,, | Performed by: INTERNAL MEDICINE

## 2022-07-29 PROCEDURE — 99215 PR OFFICE/OUTPT VISIT, EST, LEVL V, 40-54 MIN: ICD-10-PCS | Mod: 25,S$GLB,, | Performed by: INTERNAL MEDICINE

## 2022-07-29 PROCEDURE — 99999 PR PBB SHADOW E&M-EST. PATIENT-LVL II: ICD-10-PCS | Mod: PBBFAC,,, | Performed by: INTERNAL MEDICINE

## 2022-07-29 PROCEDURE — 1157F PR ADVANCE CARE PLAN OR EQUIV PRESENT IN MEDICAL RECORD: ICD-10-PCS | Mod: CPTII,S$GLB,, | Performed by: INTERNAL MEDICINE

## 2022-07-29 PROCEDURE — 99999 PR PBB SHADOW E&M-EST. PATIENT-LVL II: CPT | Mod: PBBFAC,,, | Performed by: INTERNAL MEDICINE

## 2022-07-29 PROCEDURE — 99215 OFFICE O/P EST HI 40 MIN: CPT | Mod: 25,S$GLB,, | Performed by: INTERNAL MEDICINE

## 2022-07-29 NOTE — PROGRESS NOTES
Subjective:    Patient ID:  Ayla Dela Cruz is a 77 y.o. female who presents for evaluation of No chief complaint on file.  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 Significant weight loss due to major depression off Limbitrol (stopped 2010), diet controlled T2DM. 6-months follow up, post eye operation  PCP: Dr. Olivo, see annually, do labs  Opthalmologist: Daniel Tan MD completed eye procedure yesterday, no complication  EP: Dr. Calderon  Orthopedic: Dr. Aguero  Surgeon: Dr. Gonsalez, and Dr. Dc  Rheumatologist: Dr. Pak  Prior cardiologist: Dr. Gibson, last seen over a year  Pulmonary: Marichuy Mcelroy NP  Psychologist: Nu Fermin NP, in [place], see q3 months for past 10 years, adjust psychiatric medications.  Gyn: Dr. Jordan  GI: Dr. Schultz  Neurologist: Dr. Ramirez  Dermatologist: Dennis Corrigan  Pain: Dr. 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  Nicotine: never  Alcohol: none  Illicit drugs: none  Cardiac symptoms: some fast heart beats  Home BP: 117/70, feeling of weakness  Medication compliance: yes, back on statin  Diet: regular  Caffeine: no  Labs: 1/2019 LDL 90.6 on Crestor 40 mg, normal TSH, CMP (albumin 3.1), normal CBC, Vit. D  10/2019 AST 95, Hgb 11.4  Lab Results   Component Value Date    TSH 2.314 09/10/2021        Lab Results   Component Value Date    HGBA1C 6.2 (H) 02/06/2021       Lab Results   Component Value  Date    WBC 8.37 01/24/2020    HGB 11.7 (L) 01/24/2020    HCT 37.0 01/24/2020    MCV 94 01/24/2020     01/24/2020     Normal H&H in 11/15/2021      CMP  Sodium   Date Value Ref Range Status   07/23/2022 142 136 - 145 mmol/L Final     Potassium   Date Value Ref Range Status   07/23/2022 4.1 3.5 - 5.1 mmol/L Final     Chloride   Date Value Ref Range Status   07/23/2022 108 95 - 110 mmol/L Final     CO2   Date Value Ref Range Status   07/23/2022 24 23 - 29 mmol/L Final     Glucose   Date Value Ref Range Status   07/23/2022 133 (H) 70 - 110 mg/dL Final     BUN   Date Value Ref Range Status   07/23/2022 18 8 - 23 mg/dL Final     Creatinine   Date Value Ref Range Status   07/23/2022 0.9 0.5 - 1.4 mg/dL Final   09/24/2012 0.6 0.2 - 1.4 mg/dl Final     Calcium   Date Value Ref Range Status   07/23/2022 9.5 8.7 - 10.5 mg/dL Final   09/24/2012 9.9 8.6 - 10.2 mg/dl Final     Total Protein   Date Value Ref Range Status   07/23/2022 6.5 6.0 - 8.4 g/dL Final     Albumin   Date Value Ref Range Status   07/23/2022 3.8 3.5 - 5.2 g/dL Final     Total Bilirubin   Date Value Ref Range Status   07/23/2022 1.6 (H) 0.1 - 1.0 mg/dL Final     Comment:     For infants and newborns, interpretation of results should be based  on gestational age, weight and in agreement with clinical  observations.    Premature Infant recommended reference ranges:  Up to 24 hours.............<8.0 mg/dL  Up to 48 hours............<12.0 mg/dL  3-5 days..................<15.0 mg/dL  6-29 days.................<15.0 mg/dL       Alkaline Phosphatase   Date Value Ref Range Status   07/23/2022 86 55 - 135 U/L Final   09/24/2012 63 23 - 119 UNIT/L Final     AST   Date Value Ref Range Status   07/23/2022 63 (H) 10 - 40 U/L Final   09/24/2012 26 10 - 30 UNIT/L Final     ALT   Date Value Ref Range Status   07/23/2022 61 (H) 10 - 44 U/L Final     Anion Gap   Date Value Ref Range Status   07/23/2022 10 8 - 16 mmol/L Final   09/24/2012 12 5 - 15 meq/L Final     eGFR if     Date Value Ref Range Status   07/23/2022 >60.0 >60 mL/min/1.73 m^2 Final     eGFR if non    Date Value Ref Range Status   07/23/2022 >60.0 >60 mL/min/1.73 m^2 Final     Comment:     Calculation used to obtain the estimated glomerular filtration  rate (eGFR) is the CKD-EPI equation.        @labrcntip(troponini)@    BNP   Date Value Ref Range Status   02/04/2021 127 (H) 0 - 99 pg/mL Final     Comment:     Values of less than 100 pg/ml are consistent with non-CHF populations.   }   Lab Results   Component Value Date    CHOL 159 02/17/2022    CHOL 197 01/13/2021    CHOL 142 01/31/2020     Lab Results   Component Value Date    HDL 57 02/17/2022    HDL 51 01/13/2021    HDL 42 01/31/2020     Lab Results   Component Value Date    LDLCALC 87.8 02/17/2022    LDLCALC 120.4 01/13/2021    LDLCALC 82.4 01/31/2020     Lab Results   Component Value Date    TRIG 71 02/17/2022    TRIG 128 01/13/2021    TRIG 88 01/31/2020     Lab Results   Component Value Date    CHOLHDL 35.8 02/17/2022    CHOLHDL 25.9 01/13/2021    CHOLHDL 29.6 01/31/2020      Last Echo: 2/2021  Last stress test: 9/2021, Lexiscan  Cardiovascular angiogram: none  ECG: SA, rate 67, left axis, pulmonary pattern with low anterior forces, QTc 456 msec  Fundoscopic exam: biannually, no retinopathy, being treated for glaucoma.    In 6/2014:  Hospitalized 6/3/2014 for acute right sided weakness and slurred speech  LEONARDO Jens Dela Cruz is a 69 y.o. female admitted to the services of hospital medicine via the ER for acute CVA. Presented with difficulty speaking, facial drooping and weakness of the right upper and lower extremities. She missed the time window for tPA. ST/PT/OT as well as cardiology and neurology were consulted. Dr. Jurado of cardiology managed A fib. Patient rapidly improved functioning with PT/OT/ST. Currently her goals with PT are met as she is ambulating over 400 feet independently.  She was not approved for IP rehab and  refuses SNF. She will be discharged home with outpatient PT/ST/OT evaluation and treatment.    Neurocardio work up  CT head - Mild involutional changes and findings suggesting mild microvascular ischemic change with no acute intracranial findings    Carotid US - No hemodynamically significant stenosis is noted by velocity criteria involving either internal carotid artery.  A hemodynamically significant stenosis is defined as a stenosis of greater than 50% of the internal carotid artery vessel lumen    ECHO, 6/4/2014, CONCLUSIONS     1 - Concentric hypertrophy. Wall thickness is mildly increased, with the septum and the posterior wall each measuring 1.1 cm across.    2 - Normal left ventricular systolic function (EF 60-65%).     3 - Mild mitral regurgitation.     4 - Left ventricular diastolic dysfunction.     5 - Pulmonary hypertension. estimated PA systolic pressure is 64 mmHg.    6 - Normal right ventricular systolic function .     7 - Suggestion for increase in LV mass from echo on 3/18/2014..     Echo bubble study also negative. Had episode of PAF during monitoring and convert with restart of Flecainide.   Since DC, improving strength in the right hand, right leg feels normal but tire easier. Speech improving. To receive PT/OT/speech today.  Medications reviewed - will DC ASA for now, and know the effects of the Limbitrol and Ativan, uses prn rarely. Some loss of appetite and taste.    Active problems in hospital  Acute left hemispheric CVA, MRI suggest multiple areas, was not on OAC nor antiplatelet Rx  PAF with high CHADS-VAS score, post ablations and DCCs  HTN with LVH  Interstitial lung disease  Pulmonary hypertension  Hyperlipidemia was not on statin    Since visit of 6/20, problem with peripheral swelling, now taking Lasix daily. Last hospitalization / CMP, 6/3 showed K+ 3.4 with normal renal function. Also noted AVILA along with exertional chest heaviness, on-and-off over past several years. Noted it with  walking and have to rest. Can last up to an hour. Max grade 10/10, yesterday during the day.  in hospital. Also quite anxious, stopped Limbitrol due to side effects, managed by Dr. Olivo. Quite sedentary and major decrease in appetite, due to depression. No Psychiatrist. Home BP high 175/97. ECG shows NSR, rate 61, right axis, nonspecific T waves abnormalities, prolong QTc 483 msec. Low anterior forces.    Holter, 6/30/2014:  PREDOMINANT RHYTHM  1. Sinus rhythm with heart rates varying between 43 and 67 bpm with an average of 55 bpm.     VENTRICULAR ARRHYTHMIAS  1. There were frequent polymorphic PVCs totalling 1388 and averaging 40 per hour.  There was 1 couplet.    2. There were no episodes of ventricular tachycardia.    SUPRA VENTRICULAR ARRHYTHMIAS  1. There were very rare PACs totalling 47 and averaging 1 per hour.     2. There were no episodes of sustained supraventricular tachycardia.    SINUS NODE FUNCTION  1. There was no evidence of high grade SA khai block.     AV CONDUCTION  1. There was no evidence of high grade AV block.     DIARY  1. The diary was returned, but not completed    MISCELLANEOUS  1. This was a tape of adequate length (34 hrs).     Since visit of 7/24, better, reviewed by Dr. Olivo and started antidepressant Lexapro. BMP still showed mild hypokalemia of 3.3, not using Lasix at this time. Still feeling fatigue, anxiety, and request refill for Nexium. Discussed need for GI review on chronic PPI. Lack of appetite.  Lexiscan, 7/30/2014, - Nuclear Quantitative Functional Analysis:   LVEF: 66 % (normal is 55 - 69)  LVED Volume: 50 ml (normal is 60 - 98)  LVES Volume: 17 ml (normal is 20 - 42)    Impression: NORMAL MYOCARDIAL PERFUSION  1. The perfusion scan is free of evidence for myocardial ischemia or injury.   2. There is a mild intensity fixed defect in the anteroseptal wall of the left ventricle, secondary to breast attenuation.   3. Resting wall motion is physiologic.   4.  "Resting LV function is normal.  (normal is 55 - 69)  5. The ventricular volumes are normal at rest and stress.   6. The extracardiac distribution of radioactivity is normal.     No problem with spironolactone, BP improved, home BP similar to office readings.    Since visit of 8/14, had some distress and home monitor showed HR of 40, felt "bad" and nervous to ED, note reviewed, ECG and final pulse count 57-58. Labs reviewed - normal renal function and K+ 3.8. Still taking one tab of K+ daily. Not using Lasix. Explained HR discrepancy may be due to VPCs. Reviewed by Ms. Fermin and Lexapro increased to 20 mg, 2 days ago. Feeling "a lot better". Sleeping better. Still sedentary but ready to be more active. Eating better have lost additional 5 lbs since 8/2014.    Since visit of 9/5, still with speech therapy, noted progressive near syncope with SOB and irregular heart beats. Also noted some ankle swelling over the past 2 weeks. ECG shows marked bradycardia, rate 48, right axis, pulmonary pattern, 1st degree AVB. Wellbutrin 100 mg daily along with Lexapro 20 mg by Ms. Fermin NP. BMP showed K+3.5. To use Lasix PRN    Since visit of 9/25, still with marked AVILA, only able to walk about 30' before having to stop. Bothered by increase palpitations during tapering of BB. No definite lung problem, evaluated this year. ECG shows sinus dewayne, rate 53, VPC, RBBB with suggestion of septal MI. No evidence for anemia - CBC 9/3/2014 was normal. Just started doing some activities with arm and leg exercise for the last 2 weeks, Doing some OT alternating with PT every other day.  CXR, 6/3/2014 - Lungs are clear of infiltrate and costophrenic sulci are sharp.  The cardiomediastinal silhouette appears stable.    PET scan, 4/2014 - 1.A hypermetabolic left pulmonary mass is noted with an SUV of 3.0 maximally.  A neoplastic, infectious, or granulomatous process is on the differential. Clinical correlation is suggested.  Close follow-up for " "resolution or biopsy would be suggested    2.  Elevated right-sided heart pressures are suspected with a large right atrium    3.  Right-sided pleural effusion    4.  Hypermetabolic thyroid gland asymmetrically on the right.  This may relate to thyroiditis, Graves' disease, or neoplastic process.  Ultrasound may be helpful.    5.  Small hypermetabolic focus in the expected location of the left ovary, a female pelvis ultrasound may be helpful for further evaluation.  This could relate to a uterine fibroid that has necrosed or infarcted    Biopsy of lung nodule on 5/1/2014 - negative for CA    CTA, 4/2014 - No evidence of pulmonary thromboembolism.    2.  Enlarged cardiac silhouette and secondary findings of elevated right heart pressures with diffuse mosaic lung pattern and right basilar pleural fluid suggesting cardiac decompensation/heart failure.    3. Unchanged 1 cm nodular density within the left upper lobe which previously demonstrated hypermetabolism by PET/CT and unchanged mediastinal lymphadenopathy.    4.  Subpleural nodular density within the right upper lobe is unchanged when compared with the previous study and could represent an area of remote fibrotic scarring.    5.  Heterogeneous appearance of the spleen, possibly due to the phase of contrast enhancement.  Evolving splenic infarction is not entirely excluded.    6. Suggestion of colonic wall thickening involving the descending colon.  Please correlate for symptoms of colitis.    Since visit of 10/14, rehospitalized for HF on 10/26 to 10/29/2014.  DCS - "Ayla Dela Cruz is a 69 y.o. female admitted to the services of hospital medicine via the ER for acute diastolic heart failure. C/o severe SOB and increase in leg swelling over the past two days. Under the care of Dr. Jurado. Recently was taken off metoprolol. History of paroxsymal atrial fib. Hospitalized here in June in this year for acute CVA. It was at that time, pt started Xarelto. Deficits has " "resolved. No history of heart failure.     Hospital Course: The patient was admitted with acute CHF biventricular and mild elevation of her troponin's at 0.029 - 0.035 and therefore an anginal equivalent was suspected. The patient also had significant hypertension upon admission and although she was not in atrial fibrillation, her EKG showed a significant first degree AV block and RBBB. Discussion was made as to whether or not to decrease the patient flecainide; however due to the risk of her going back into atrial fibrillation this was not done. Upon discharge the patient's lisinopril was increased to 10 mg and aldactone was added."    RHC done HEMODYNAMIC RESULTS:    LVEDP (Pre): 24 mmHg  BP: 166/104    Air rest:  PA: 90/34 (54)  PW:  (24)  RVEDP: 16  RA:  (16)    C. SUMMARY:    1. Diastolic dysfunction.  2. - Kee CO 2.64 L/min  3. - Mean systemic pressure 108.6 mmHG, stage II HTN  4. - SVR 41.16 Wood Units  5. - PVR 11.36 Wood Units  6. - Pulmonary HTN due to left sided heart disease and stage II HTN    D. RECOMMENDATIONS:    1. Routine post-cath care.  2. Cardiac rehab referral.  3. Follow up with Dr. Barrett Jurado.  4. - Aggressive BP lowering and diuresis    Repeat ECHO 11/5/2014 CONCLUSIONS     1 - Concentric remodeling. Septal wall thickness is increased, and posterior wall thickness is mildly increased, with the septum measuring 1.3 cm and the posterior wall measuring 1.1 cm across.    2 - Mildly to moderately depressed left ventricular systolic function (EF 40-45%).     3 - Left ventricular diastolic dysfunction. E/e'(lat) is 16    4 - Right ventricular enlargement with mildly depressed systolic function.     5 - Pulmonary hypertension. estimated PA systolic pressure is 56 mmHg.     6 - Intermediate central venous pressure.     7 - No evidence of intracardiac shunt.     8 - No significant change from Echo of 6/4/2014.    Active problems:  Progressive severe SOB, functional class IV  Diastolic dysfunction, " "elevated BNP and Troponin  Hypokalemia due to diuretics  Secondary Pulmonary HTN with peripheral edema  HTN  History of CVA 6/3/2014  PAF controlled with Flecanide  Marked bradycardia with BB  On OAC  Hematuria    At home receiving PT, OT and speech Rx twice a week, with  nurse once a week, no problem with medications. Feeling better, sleeping well. Home /73.    Since visit of 11/14, feeling "much better", concern of occasional HTN, home BP /66-99, HR . Lot more active, no problem. Not using walker, no CP, SOB, nor dizziness. Recent BMP - normal renal function and K+ 4.1.    Since visit of 12/19/2014, worry about HTN home readings similar to office up to . Morning reading is higher. No HA nor visual abnormalities. Xanax has helped but afraid to use too much. ECG shows sinus arrhythmia, rate of 65, late transition and LAFB. Also bothered with dry cough with the Lisinopril. And started to have return of arm pains that was attributed to atorvastatin, on Crestor. Discussed options.     Since visit of 1/16/2015, noted frequent nocturia, 8-10 times, taking losartan-HCT at night time. Have stopped using Lasix and spironolactone for past 2 months. More active without much problem. Labs normal renal function, K+ 4.2, BNP now 37, A1C 5.6%.    Since visit of 2/18/2015, improving, no further dizziness, some SOB when "stressed", usually helped with alprazolam, use only prn, about 3-6 times weekly. Compliant with medications. Been off Crestor for 6 weeks with concern of muscle problem. Home BP is fine, similar to office.     Since visit of 4/15, appetite returned, feeling a lot better. Active, walking twice a week for about half an hour. Also do calisthenic about twice a week, no problem. Seeing Dr. Zavaleta for generalized pruritis for past 3 months. Cardiac wise less AVILA. Sleeping well. Labs in 5/2015, normal CBC without eosinophilia, thyroid normal, ferritin level low normal at 21. Off Crestor for " "couple months due to fear of muscle pain but did not find much difference being off medication. Last Lipid in 11/2014 was good, on daily dose of Crestor. Home /79.    Since visit of 7/2/2015, feeling "great", very active without problem, no more AVILA nor dizziness with standing. Compliant with medications, don't miss. Using Tylenol 500 mg BID for arthritis. Notice easy bruising on Xarelto. Home /10.  Holter - PREDOMINANT RHYTHM  1. Sinus arrhythmia with heart rates varying between 46 and 110 bpm with an average of 73 bpm.     VENTRICULAR ARRHYTHMIAS  1. There were very rare PVCs recorded totalling 8 and averaging less than 1 per hour.     2. There were no episodes of ventricular tachycardia.    SUPRA VENTRICULAR ARRHYTHMIAS  1. There were very frequent PACs totalling 3054 and averaging 132 per hour.  There were 29 bigeminal cycles.  There were 103 couplets.    2. 3% of complexes.    3. There were no episodes of sustained supraventricular tachycardia.    SINUS NODE FUNCTION  1. There was no evidence of high grade SA khai block.     AV CONDUCTION  1. There was no evidence of high grade AV block.     2. The longest RR interval was 1565 msec.     DIARY  1. The diary was properly completed.     2. There was 1 episode of skipping beats reported. The corresponding rhythm strips revealed the following:             During event 1 the rhythm was sinus rhythm at 75 bpm.     3. There was 1 episode of skipped beat reported. The corresponding rhythm strips revealed the following:             During event 1 the rhythm was sinus arrhythmia at 63 bpm.     MISCELLANEOUS  1. This was a tape of adequate length (23 hrs).     Since visit of 10/15, place on new antidepressant, Venlafaxine 75 mg daily, tried 2 and developed rapid HR to 125 bpm, feels more anxious, now switched to Citalopram. Also noted the daily dose of Lorazepam does not seem adequate. Orthostatic VS is positive with lying 142/73, , standing 120/62, HR " "110. Been taking spironolactone 25 mg for last 3 days. Does feel thirsty. Labs reviewed A1C 5.8%, CBC, BMP were WNL. Lipid shows LDL 99, non-. Goal preferred is <70 and < 100. No problem with 10 mg of Crestor. Had vacation at [place] and [place], walked all over without problem.    Since visit of 1/18/2016, no new problem. 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 Labs with , non-, hsCRP at 2.56.     In 10/2016, had acute GB attack with rupture in 8/2016, then tried on Wellbutrin took only 1-2 tabs and BP up to 201/100 with head tightness. Subsequently back to normal, the last 2.5 days took Losartan bid. Discussed Rx options for HTN. Advised to be more active, regular exercise. Lab reviewed LDL in 8/2016 93.     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 Still with daily palpitations, last up to half hours. Have electronic watch, David Barroso, since 9/2016, suppose harmonize patient with the universe. Feeling better if on during the day. Lot of walking at school, no problem.   Holter in 10/2016 - PREDOMINANT RHYTHM  1. Sinus rhythm with heart rates varying between 52 and 144 bpm with an average of 75 bpm.     2. Paroxysmal atrial fibrillation    VENTRICULAR ARRHYTHMIAS  1. There were occasional mostly monomorphic PVCs totalling 596 and averaging 13 per hour.     2. There were no episodes of ventricular tachycardia.    SUPRA VENTRICULAR ARRHYTHMIAS  1. There were frequent PACs totalling 2982 and averaging 69 per hour.  There were 69 bigeminal cycles.  There were 55 couplets.    2. There were no episodes of sustained supraventricular tachycardia.    3. Paroxysmal atrial fibrillation was noted in the the study.     SINUS NODE FUNCTION  1. There was no evidence of high grade SA khai block.     AV CONDUCTION  1. There was no evidence of high grade AV khai filtering.     2. The longest RR interval was 1725 msec.     DIARY  1. The diary was returned, but not " "completed    MISCELLANEOUS  1. This was a tape of adequate length (43 hrs).     ECHO CONCLUSIONS     1 - Hyperdynamic left ventricular systolic function (EF 65-70%).     2 - Normal left ventricular diastolic function.     3 - Normal right ventricular systolic function .     4 - Pulmonary hypertension. The estimated PA systolic pressure is 64 mmHg.     5 - Less evidence for LVH from Echo in 11/2014.     In 5/2017, bothered by recurrent PAF with AVILA after 10 feet walking. Felt better before on Flecainide 100 mg bid, but Holter continued to show PAF. Attempt up titration, problem with itching, switched to propafenone 150 mg tid, continued with itching and reviewed by allergist, treated with 10 days of cortisone with benefit. No hive and no itching, just don't feel good due to the irregular rhythm. History reviewed, had 2 prior AF ablation, last in [place], [place] in around 2000, recall being told not to do again. Recall seeing an [facility] EP doc but didn't like him. Discussed various options. Will stop antiarrhythmic for now, will be going on a 16 days trip to [place]. Reviewed prior medications, have taken Tenormin, metoprolol tartrate, and short-acting diltiazem in the past without problem. Refer to Dr. Calderon for review. ECG in AF, rate 99, PRWP, LAFB    In 11/2017, post AF ablation, felt good for a few weeks, noted some SOB and had review with Dr. Calderon on 11/1 - 72 year old female with a longstanding history of atrial arrhythmias. Her [id number] Score is 5 (age, female, TIA, HTN) so she should remain on anticoagulation indefinitely. She has failed Flecainide. She is now 6 weeks post-PVI and MA flutter line, and maintaining sinus rhythm on low-dose Amiodarone. Given her intermittent AVILA which started post-ablation, I have stopped Amiodarone which I think is the likely culprit. I instructed her to continue taking Lasix PRN, as well as metoprolol and Xarelto." ECG on 11/1 was in NSR, rate 61, PRWP.  Recommended to " "stop amiodarone but then started to feel the palpitation daily, felt nervous and at the same time being taper down on the nightly Ativan. Did have some breakthrough anxiety attacks. Also had strained the upper back and right arm / shoulder, requesting some Tramadol, tried Jan's Tramadol with good relief. Understand this is an opoid. Used Excedrin with caffeine and bothered by more palpitations.    In 3/2018, here for recurrent palpitations, no inciting factors over the past 6 weeks. Had review with Dr. Calderon in 2/2018 - "72 year old female with a longstanding history of atrial arrhythmias and prior ablations at outside institutions. Her [id number] Score is 5 (age, female, TIA, HTN) so she should remain on anticoagulation indefinitely. She is now 5 months post-PVI and MA flutter line, and maintaining sinus rhythm off Amiodarone. The plan is to continue Xarelto, and to see me again in 6 months." Palpitations appears associated with AVILA, had difficulties walking in house or up a ramp. Started 100 mg of amiodarone last week, daily, feels some benefit. ECG today in Sinus rhythm vs wandering atrial pacemaker with frequent PJC, rate 59. Also taking Losartan in the morning appears more effective.  Holter 11/2017 - PREDOMINANT RHYTHM  1. Sinus arrhythmia with heart rates varying between 39 and 102 bpm with an average of 58 bpm.     VENTRICULAR ARRHYTHMIAS  1. There were occasional PVCs totalling 728 and averaging 15 per hour.  There were 5 bigeminal cycles.     2. There were no episodes of ventricular tachycardia.    SUPRA VENTRICULAR ARRHYTHMIAS  1. There was no supraventricular ectopic activity.    SINUS NODE FUNCTION  1. There was no evidence of high grade SA khai block.     AV CONDUCTION  1. There was no evidence of high grade AV block.     2. The longest RR interval was 1820 msec.     DIARY  1. The diary was not returned    MISCELLANEOUS  1. There were occasional hookup related artifacts. Overall, the study was of " "good quality.     2. This was a tape of adequate length (48 hrs).     in 5/2018, no new problem, still concern of AVILA, usually with walking, variable as little 10 feet or fine with some long walk, can play flute for an hour but find difficulties in holding a note. Off daily amiodarone, uses it prn for palpitation, find helpful. Labs reviewed from 1/2018, TSH, Vitamin D, LDL 68.2, A1C 5.9%, CMP - normal renal function, K+ 3.6. To go to [place] for a month in 8/2018.    In 9/2018, return from a month family reunion trip to [place]. Problem with hypotension with Tramadol and Losartan. Had review with Dr. Calderon on 9/5 "Ayla Dela Cruz is a 73 year old female with a longstanding history of atrial arrhythmias and prior ablations at outside institutions. Her [id number] Score is 5 (age, female, TIA, HTN) so she should remain on anticoagulation indefinitely. She is now 11 months post-PVI and MA flutter line, and maintaining sinus rhythm off Amiodarone. However, she is feeling poorly, with frequent PACs and borderline hypotension. The plan is to stop Losartan, echo in next several weeks, Holter monitor in 6 weeks, and follow-up with me in 8 weeks."  Off both medication on 8/31, no problem now. No heart worry. Denies any CP nor SOB. Still teaching. ECG on 9/5 shows NSR with sinus arrhythmia.    In 3/2019, return for follow up, had syncopal spell with hypotension at [facility] required reconstruction of the right ankle, now in PT, doing well. No heart complication, no AF. LDL 90.6 in 1/2019. 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 No heart worries, no CP nor SOB, much better, breathing improved with daily Breo use. Discussed option with NOAC.    In 9/2019, 6 months review, concern of bruising with Xarelto, recall problem with dark stool with Eliquis. Discussed option.     In 1/2020 return due to allergic reaction to Pradaxa, had to stop Eliquis for similar problems - hives and rash, difficult to sleep. " "Also problem with embolism when off NOAC for 7 days. Discuss alternatives.    In 3/2020, same problem with Savaysa, noted about an hour of PAF this morning, felt nervous. Troubled by recent viral infection and also pain injection with steroid, which helped the rash temporary. Would like to proceed with mechanical alternative. History include past use of Coumadin for about 2 years, had some difficult with GIB and also required up to 3 times a week testing.    In 8/2020, here for 6-months review. Troubled more with JOSE with quarantine. Working with therapist. Keeping busy with working on a new book, music also. No heart worries.    In 8/2021, return for annual review. Problem with requiring medication changes for major depression with anorexia and malnutrition. Weight loss of 20 lbs and now improving. No cardiac issue except for slow heart rate down to 52 bpm, asymptomatic.    Fco Calderon MD noted "75 year old female with a longstanding history of atrial arrhythmias and prior ablations at outside institutions. Her [id number] Score is 5 (age, female, TIA, HTN) so she should remain on anticoagulation indefinitely. She is now >3 years post-PVI and MA flutter line, and is maintaining sinus rhythm off Amiodarone.      Ms. Dela Cruz had a likely cardioembolic event after stopping AC for a week. Ms. Dela Cruz wants to be on an anticoagulant she can crush. I have switched her back to eliquis 5 mg bid. She thinks her pruritis was from [employer] laundry detergent and not eliquis.     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 I have told her to contact Dr. Pedraza if she decides she can no longer take anticoagulants so she can discuss details of procedure with him further."    In 1/2022, return for follow up. " Post PPM in 10/2021, one episode of near syncope with head injury, no clear etiology, no problem over the past 2 months. Noted some morning dizziness with  to 110 and HR in the 80s.. Not as active as before and also noted some cognitive dysfunction with loss of ID. No cardiac complaints. Medication reviewed, on low-dose BB for PAF rate control.    Echo 2/2021 - Mild concentric hypertrophy and normal systolic function. The estimated ejection fraction is 60%  There is no evidence of intracardiac shunting.  Small circumferential pericardial effusion.  Mild aortic regurgitation.  Moderate tricuspid regurgitation.  Mild to moderate pulmonic regurgitation.  There is moderate tricuspid valve stenosis.  Moderate right atrial enlargement.  Normal right ventricular size.  Mild mitral regurgitation.  Mild left atrial enlargement.  Intermediate central venous pressure (8 mmHg).  The estimated PA systolic pressure is 53 mmHg.  There is pulmonary hypertension.  Normal left ventricular diastolic function.    Lexiscan 9/2021 - Impression:     1.  Scintigraphically negative for ischemia.  2. Fixed defect along the septal wall is suspicious for a small infarct.  3. The global left ventricular systolic function is normal with an LV ejection fraction of 80 % and no evidence of LV dilatation.  4. Wall motion is normal.    HPI comments: in 7/2022, return post eye operation, unable to be active due to vision, now able to see. Very weak and frail due to anorexia and sedentary status. Noted some heart racing for past 2 days. Back to NSR today. Under nutritional review.    ROS     Constitutional: negative for chills, have chronic fatigue, no fevers, sweats, no further slurred speech, and no dysarthria, have lost of appetite with JOSE, intolerance to heat, bruises easily on NOAC and steroid, weight down another 8 lbs since 1/2022.   Eyes: negative for icterus, redness and visual disturbance, have visual halo, recurrence of bleed in the  "right Iris  Respiratory: negative for asthma, cough have resolved off ACE-I, no dyspnea on exertion, SOB with HR 85 to 100 bpm, have lifelong snoring, Hartford score 6 improved down to 4 up to a 6 , no hemoptysis, pleurisy/chest pain and wheezing  Cardiovascular: negative for chest pain, chest pressure/discomfort, no further orthostatic dizziness, and still some palpitations but have irregular heart beats, no paroxysmal nocturnal dyspnea and syncope  Gastrointestinal: negative for abdominal pain, nausea and vomiting, have GERD  Musculoskeletal:positive for arthralgias, and less right sided muscle weakness with improvement in leg strength, improved bilateral ankle pains - OA and no myalgias  Neurological: negative for coordination problems, gait problems, headaches, paresthesia, have some tremors but able to draw, loss of voice at times, and no vertigo  Psych: positive for depression, nervousness, anxiety, less stressed due to 's have improved  Skin - no further pruritic rash, have dryness    Objective:    Physical Exam    General appearance: alert, appears stated age, cooperative and no distress, decrease skin turgor, RA O2 sat. 96%  Head: Normocephalic, without obvious abnormality, atraumatic  Eyes:  conjunctivae/corneas clear. PERRL, EOM's intact.    Neck: no adenopathy, no carotid bruit, no JVD, supple, symmetrical, trachea midline and thyroid not enlarged, symmetric, no tenderness/mass/nodules  Lungs:  clear to auscultation bilaterally  Chest wall: no tenderness  Heart: regular rate and rhythm, normal S1, S2 normal, click, rub or gallop    Abdomen: soft, non-tender; bowel sounds normal; no masses,  no organomegaly, waist 31" down to 27" hip 42"  Extremities: extremities no further weakness of the right upper extremity, atraumatic, no cyanosis and have no edema, minor varicose veins  Pulses: 2+ and symmetric    Assessment:       1. Paroxysmal atrial fibrillation, ablations X 3 1995, 1997, 9/2017, CHADS-VAS " score 5, HAS-BLED score 5     2. Pulmonary hypertension, with right sided congestive heart failure    3. Frail elderly    4. Chronic fatigue    5. Anorexia nervosa    6. Elevated brain natriuretic peptide (BNP) level, range 98 - 1045    7. Elevated LFTs    8. Anticoagulant long-term use    9. At risk for cardiovascular event    10. BMI less than 19,adult    11. Cardiac pacemaker in situ, [employer], dual chamber, 10/14/2021    12. Chronic diastolic heart failure, 2014    13. H/O: CVA (cerebrovascular accident), June 2014    14. History of DVT (deep vein thrombosis)    15. Mixed hyperlipidemia    16. Primary hypertension    17. Hyphema, right eye    18. Hypoalbuminemia due to protein-calorie malnutrition    19. Interstitial lung disease    20. Iron deficiency anemia due to chronic blood loss    21. Long term (current) use of anticoagulants    22. Hypertensive left ventricular hypertrophy with heart failure    23. Medication intolerance    24. Microalbuminuria    25. S/P ablation of atrial flutter    26. Sedentary lifestyle, 6/2014    27. Dehydration, moderate    28. Elevated hemoglobin A1c         Plan:       Ayla was seen today for follow-up.    Diagnoses and associated orders for this visit:    Paroxysmal atrial fibrillation, ablations X 3 1995, 1997, 9/2017, CHADS-VAS score 5, HAS-BLED score 5   -     Echo; Future  -     EKG 12-lead    Pulmonary hypertension, with right sided congestive heart failure  -     Echo; Future    Frail elderly    Chronic fatigue    Anorexia nervosa    Elevated brain natriuretic peptide (BNP) level, range 98 - 1045  -     Echo; Future    Elevated LFTs    Anticoagulant long-term use    At risk for cardiovascular event    BMI less than 19,adult    Cardiac pacemaker in situ, [employer], dual chamber, 10/14/2021    Chronic diastolic heart failure, 2014  -     Echo; Future  -     BNP; Future; Expected date: 07/29/2022    H/O: CVA (cerebrovascular accident), June 2014    History  of DVT (deep vein thrombosis)    Mixed hyperlipidemia    Primary hypertension    Hyphema, right eye    Hypoalbuminemia due to protein-calorie malnutrition    Interstitial lung disease    Iron deficiency anemia due to chronic blood loss    Long term (current) use of anticoagulants    Hypertensive left ventricular hypertrophy with heart failure    Medication intolerance    Microalbuminuria    S/P ablation of atrial flutter    Sedentary lifestyle, 6/2014    Dehydration, moderate    Elevated hemoglobin A1c  -     Hemoglobin A1C; Future; Expected date: 07/29/2022    - All medical issues reviewed, continue current Rx   - Warning signs of MI and stroke given, if symptoms last more than 5 minutes, stop immediately and call 911, then chew 2-4 low-dose ASA (81 mg).  - Need to remain well hydrated but avoid drinking within 4 hours of bedtime. Recommend at least 90 oz of fluid daily per IOM ([facility]stitute of Medicine) suggestion.  - CV status stable, off antiarrhythmic, all medications reviewed, patient acknowledge good understanding.  - Recommend healthy living: healthy diet and regular exercise aiming for fitness, restorative sleep and weight control  - Recommend using Tylenol as first line pain medications.  - Instruction for Mediterranean diet and heart healthy exercise given.  - Need good exercise program, 4 key elements: 1. Aerobic (walking, swimming, dancing, jogging, biking, etc, 2. Muscle strengthening / resistance exercise, need to do 2-3 times weekly, 3. Stretching daily, good stretch takes a whole  total minute. 4. Balance exercise daily.  - Highly recommend 30-60 minutes of exercise daily, can have Sunday off, with 2-3 sessions of muscle strengthening weekly. A  would be very helpful.  - Recommend at least biannual cardiovascular evaluation in view of her significant risk factors, patient 's preference.  - Phone review / encourage use of [employer]    Patient Active Problem List   Diagnosis     Paroxysmal atrial fibrillation, ablations X 3 1995, 1997, 9/2017, CHADS-VAS score 5, HAS-BLED score 5     Hypertension, 1985    Hyperlipidemia, baseline     Glaucoma (increased eye pressure)    Fibroid uterus    Thickened endometrium    Abnormal CT scan, chest    Interstitial lung disease    Pulmonary hypertension, with right sided congestive heart failure    H/O: CVA (cerebrovascular accident), June 2014    LVH (left ventricular hypertrophy) due to hypertensive disease    Depression    Sedentary lifestyle, 6/2014    OA (osteoarthritis)    Bradycardia, drug induced    Chronic diastolic heart failure, 2014    Elevated brain natriuretic peptide (BNP) level, range 98 - 1045    Microalbuminuria    Hypoalbuminemia due to protein-calorie malnutrition    TIA (transient ischemic attack), 11/3/2014    S/P ablation of atrial flutter    JOSE (generalized anxiety disorder)    Other spondylosis, lumbar region    Cervical spondylosis    Medication intolerance    Mild persistent asthma without complication    Anticoagulant long-term use    GERD (gastroesophageal reflux disease)    Renal infarct, due to embolism off Eliquis for 7 days    Mild intermittent asthma without complication    Elevated troponin    BMI less than 19,adult    Localized hives    Elevated LFTs    Iron deficiency anemia due to chronic blood loss    Other specified spondylopathies, lumbar region    Recurrent major depressive disorder, in partial remission    Gastroenteritis    Other spondylosis, cervical region    History of DVT (deep vein thrombosis)    Chronic sinus complaints    Second degree AV block, Mobitz type I    Anemia    Syncope    Protein calorie malnutrition    At risk for cardiovascular event    Atrial fibrillation    Long term (current) use of anticoagulants    Atrial arrhythmia    Cardiac pacemaker in situ, [employer], dual chamber, 10/14/2021    Cognitive impairment    Frail elderly     Dehydration, moderate    Hyphema, right eye    Vitreous hemorrhage, right eye    Primary open angle glaucoma (POAG) of both eyes, indeterminate stage    Pseudophakia of both eyes    Chronic fatigue    Anorexia nervosa     Greater than 50% was spent in counseling and coordination of care. The above assessment and plan have been discussed at length. Labs and procedure over the last 6 months reviewed. Problem List updated. Asked to bring in all active medications / pills bottles with next visit.

## 2022-08-03 ENCOUNTER — DOCUMENT SCAN (OUTPATIENT)
Dept: HOME HEALTH SERVICES | Facility: HOSPITAL | Age: 77
End: 2022-08-03
Payer: MEDICARE

## 2022-08-04 ENCOUNTER — OFFICE VISIT (OUTPATIENT)
Dept: OPHTHALMOLOGY | Facility: CLINIC | Age: 77
End: 2022-08-04
Payer: MEDICARE

## 2022-08-04 ENCOUNTER — LAB VISIT (OUTPATIENT)
Dept: LAB | Facility: HOSPITAL | Age: 77
End: 2022-08-04
Attending: INTERNAL MEDICINE
Payer: MEDICARE

## 2022-08-04 ENCOUNTER — EXTERNAL HOME HEALTH (OUTPATIENT)
Dept: HOME HEALTH SERVICES | Facility: HOSPITAL | Age: 77
End: 2022-08-04
Payer: MEDICARE

## 2022-08-04 DIAGNOSIS — I11.0 HYPERTENSIVE HEART DISEASE WITH CONGESTIVE HEART FAILURE: ICD-10-CM

## 2022-08-04 DIAGNOSIS — R42 VERTIGO AS LATE EFFECT OF STROKE: Primary | ICD-10-CM

## 2022-08-04 DIAGNOSIS — R00.1 SEVERE SINUS BRADYCARDIA: ICD-10-CM

## 2022-08-04 DIAGNOSIS — I50.32 CHRONIC DIASTOLIC HEART FAILURE: ICD-10-CM

## 2022-08-04 DIAGNOSIS — Z98.890 STATUS POST EYE SURGERY: Primary | ICD-10-CM

## 2022-08-04 DIAGNOSIS — I69.398 VERTIGO AS LATE EFFECT OF STROKE: Primary | ICD-10-CM

## 2022-08-04 LAB
ANION GAP SERPL CALC-SCNC: 5 MMOL/L (ref 8–16)
BUN SERPL-MCNC: 16 MG/DL (ref 8–23)
CALCIUM SERPL-MCNC: 9.4 MG/DL (ref 8.7–10.5)
CHLORIDE SERPL-SCNC: 108 MMOL/L (ref 95–110)
CO2 SERPL-SCNC: 27 MMOL/L (ref 23–29)
CREAT SERPL-MCNC: 0.7 MG/DL (ref 0.5–1.4)
EST. GFR  (NO RACE VARIABLE): >60 ML/MIN/1.73 M^2
ESTIMATED AVG GLUCOSE: 123 MG/DL (ref 68–131)
GLUCOSE SERPL-MCNC: 81 MG/DL (ref 70–110)
HBA1C MFR BLD: 5.9 % (ref 4–5.6)
POTASSIUM SERPL-SCNC: 4 MMOL/L (ref 3.5–5.1)
SODIUM SERPL-SCNC: 140 MMOL/L (ref 136–145)

## 2022-08-04 PROCEDURE — 83036 HEMOGLOBIN GLYCOSYLATED A1C: CPT | Performed by: INTERNAL MEDICINE

## 2022-08-04 PROCEDURE — 3288F PR FALLS RISK ASSESSMENT DOCUMENTED: ICD-10-PCS | Mod: CPTII,S$GLB,, | Performed by: OPHTHALMOLOGY

## 2022-08-04 PROCEDURE — 1160F RVW MEDS BY RX/DR IN RCRD: CPT | Mod: CPTII,S$GLB,, | Performed by: OPHTHALMOLOGY

## 2022-08-04 PROCEDURE — 1160F PR REVIEW ALL MEDS BY PRESCRIBER/CLIN PHARMACIST DOCUMENTED: ICD-10-PCS | Mod: CPTII,S$GLB,, | Performed by: OPHTHALMOLOGY

## 2022-08-04 PROCEDURE — 1159F MED LIST DOCD IN RCRD: CPT | Mod: CPTII,S$GLB,, | Performed by: OPHTHALMOLOGY

## 2022-08-04 PROCEDURE — 80048 BASIC METABOLIC PNL TOTAL CA: CPT | Performed by: INTERNAL MEDICINE

## 2022-08-04 PROCEDURE — 1101F PR PT FALLS ASSESS DOC 0-1 FALLS W/OUT INJ PAST YR: ICD-10-PCS | Mod: CPTII,S$GLB,, | Performed by: OPHTHALMOLOGY

## 2022-08-04 PROCEDURE — 1159F PR MEDICATION LIST DOCUMENTED IN MEDICAL RECORD: ICD-10-PCS | Mod: CPTII,S$GLB,, | Performed by: OPHTHALMOLOGY

## 2022-08-04 PROCEDURE — 1101F PT FALLS ASSESS-DOCD LE1/YR: CPT | Mod: CPTII,S$GLB,, | Performed by: OPHTHALMOLOGY

## 2022-08-04 PROCEDURE — 1157F ADVNC CARE PLAN IN RCRD: CPT | Mod: CPTII,S$GLB,, | Performed by: OPHTHALMOLOGY

## 2022-08-04 PROCEDURE — 1157F PR ADVANCE CARE PLAN OR EQUIV PRESENT IN MEDICAL RECORD: ICD-10-PCS | Mod: CPTII,S$GLB,, | Performed by: OPHTHALMOLOGY

## 2022-08-04 PROCEDURE — 1126F AMNT PAIN NOTED NONE PRSNT: CPT | Mod: CPTII,S$GLB,, | Performed by: OPHTHALMOLOGY

## 2022-08-04 PROCEDURE — 99024 POSTOP FOLLOW-UP VISIT: CPT | Mod: S$GLB,,, | Performed by: OPHTHALMOLOGY

## 2022-08-04 PROCEDURE — 99999 PR PBB SHADOW E&M-EST. PATIENT-LVL III: ICD-10-PCS | Mod: PBBFAC,,, | Performed by: OPHTHALMOLOGY

## 2022-08-04 PROCEDURE — 3288F FALL RISK ASSESSMENT DOCD: CPT | Mod: CPTII,S$GLB,, | Performed by: OPHTHALMOLOGY

## 2022-08-04 PROCEDURE — 99999 PR PBB SHADOW E&M-EST. PATIENT-LVL III: CPT | Mod: PBBFAC,,, | Performed by: OPHTHALMOLOGY

## 2022-08-04 PROCEDURE — 99024 PR POST-OP FOLLOW-UP VISIT: ICD-10-PCS | Mod: S$GLB,,, | Performed by: OPHTHALMOLOGY

## 2022-08-04 PROCEDURE — 1126F PR PAIN SEVERITY QUANTIFIED, NO PAIN PRESENT: ICD-10-PCS | Mod: CPTII,S$GLB,, | Performed by: OPHTHALMOLOGY

## 2022-08-04 NOTE — PROGRESS NOTES
HPI     Pt presents for post op AC washout OD.     States the eye is getting better and she is able to see now.     Tobra QID OD   PF QID OD   Atro BID OD     Last edited by Yamileth Vazquez on 8/4/2022 10:39 AM. (History)            Assessment /Plan     For exam results, see Encounter Report.    Status post eye surgery      POW1 AC washout  Doing well, IOP good, AC nearly quiet    Stop tobra  Taper off PF over 3 weeks  Continue atropine BID OD    No glaucoma drops OD    F/u 3-4 weeks, IOP check

## 2022-08-09 ENCOUNTER — OFFICE VISIT (OUTPATIENT)
Dept: PULMONOLOGY | Facility: CLINIC | Age: 77
End: 2022-08-09
Payer: MEDICARE

## 2022-08-09 VITALS
HEART RATE: 68 BPM | DIASTOLIC BLOOD PRESSURE: 74 MMHG | OXYGEN SATURATION: 98 % | WEIGHT: 100.06 LBS | SYSTOLIC BLOOD PRESSURE: 145 MMHG | BODY MASS INDEX: 15.71 KG/M2 | HEIGHT: 67 IN

## 2022-08-09 DIAGNOSIS — R40.0 DAYTIME SOMNOLENCE: Primary | ICD-10-CM

## 2022-08-09 DIAGNOSIS — G47.30 SLEEP APNEA, UNSPECIFIED TYPE: ICD-10-CM

## 2022-08-09 PROCEDURE — 1157F PR ADVANCE CARE PLAN OR EQUIV PRESENT IN MEDICAL RECORD: ICD-10-PCS | Mod: CPTII,S$GLB,, | Performed by: NURSE PRACTITIONER

## 2022-08-09 PROCEDURE — 3077F PR MOST RECENT SYSTOLIC BLOOD PRESSURE >= 140 MM HG: ICD-10-PCS | Mod: CPTII,S$GLB,, | Performed by: NURSE PRACTITIONER

## 2022-08-09 PROCEDURE — 99999 PR PBB SHADOW E&M-EST. PATIENT-LVL IV: ICD-10-PCS | Mod: PBBFAC,,, | Performed by: NURSE PRACTITIONER

## 2022-08-09 PROCEDURE — 1101F PR PT FALLS ASSESS DOC 0-1 FALLS W/OUT INJ PAST YR: ICD-10-PCS | Mod: CPTII,S$GLB,, | Performed by: NURSE PRACTITIONER

## 2022-08-09 PROCEDURE — 3078F PR MOST RECENT DIASTOLIC BLOOD PRESSURE < 80 MM HG: ICD-10-PCS | Mod: CPTII,S$GLB,, | Performed by: NURSE PRACTITIONER

## 2022-08-09 PROCEDURE — 1126F PR PAIN SEVERITY QUANTIFIED, NO PAIN PRESENT: ICD-10-PCS | Mod: CPTII,S$GLB,, | Performed by: NURSE PRACTITIONER

## 2022-08-09 PROCEDURE — 1101F PT FALLS ASSESS-DOCD LE1/YR: CPT | Mod: CPTII,S$GLB,, | Performed by: NURSE PRACTITIONER

## 2022-08-09 PROCEDURE — 1126F AMNT PAIN NOTED NONE PRSNT: CPT | Mod: CPTII,S$GLB,, | Performed by: NURSE PRACTITIONER

## 2022-08-09 PROCEDURE — 99999 PR PBB SHADOW E&M-EST. PATIENT-LVL IV: CPT | Mod: PBBFAC,,, | Performed by: NURSE PRACTITIONER

## 2022-08-09 PROCEDURE — 99213 OFFICE O/P EST LOW 20 MIN: CPT | Mod: S$GLB,,, | Performed by: NURSE PRACTITIONER

## 2022-08-09 PROCEDURE — 3288F PR FALLS RISK ASSESSMENT DOCUMENTED: ICD-10-PCS | Mod: CPTII,S$GLB,, | Performed by: NURSE PRACTITIONER

## 2022-08-09 PROCEDURE — 99213 PR OFFICE/OUTPT VISIT, EST, LEVL III, 20-29 MIN: ICD-10-PCS | Mod: S$GLB,,, | Performed by: NURSE PRACTITIONER

## 2022-08-09 PROCEDURE — 3077F SYST BP >= 140 MM HG: CPT | Mod: CPTII,S$GLB,, | Performed by: NURSE PRACTITIONER

## 2022-08-09 PROCEDURE — 1159F MED LIST DOCD IN RCRD: CPT | Mod: CPTII,S$GLB,, | Performed by: NURSE PRACTITIONER

## 2022-08-09 PROCEDURE — 1157F ADVNC CARE PLAN IN RCRD: CPT | Mod: CPTII,S$GLB,, | Performed by: NURSE PRACTITIONER

## 2022-08-09 PROCEDURE — 3288F FALL RISK ASSESSMENT DOCD: CPT | Mod: CPTII,S$GLB,, | Performed by: NURSE PRACTITIONER

## 2022-08-09 PROCEDURE — 1159F PR MEDICATION LIST DOCUMENTED IN MEDICAL RECORD: ICD-10-PCS | Mod: CPTII,S$GLB,, | Performed by: NURSE PRACTITIONER

## 2022-08-09 PROCEDURE — 3078F DIAST BP <80 MM HG: CPT | Mod: CPTII,S$GLB,, | Performed by: NURSE PRACTITIONER

## 2022-08-09 RX ORDER — DORZOLAMIDE HYDROCHLORIDE AND TIMOLOL MALEATE 20; 5 MG/ML; MG/ML
SOLUTION/ DROPS OPHTHALMIC
COMMUNITY
Start: 2022-08-07 | End: 2023-02-13 | Stop reason: SDUPTHER

## 2022-08-09 NOTE — PATIENT INSTRUCTIONS
Ordering overnight sleep study at home to evaluate for sleep apnea    If positive will order CPAP machine, notify clinic when machine is delivered to home to set up 31 days compliance appointment. You must use the machine for a least 4 hours every night.     Overnight pulse test to evaluate for supplemental oxygen

## 2022-08-09 NOTE — PROGRESS NOTES
8/9/2022    Ayla Dela Cruz  Office Note    Chief Complaint   Patient presents with    6m f/u    Fatigue       HPI:   8/9/2022- recent right eye surgery July 2022; holding Trelegy due to inhaled steroid. Cough is resolved.    states loud snoring at night, complaint of daytime fatigue, worsening with time. Wakes up with generalized body aches and numbness in lower arms.     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 Has resolved. Followed by Dr. Jurado and Dr. Yeh cardiologists. Noticed breathing improved following procedure.  SOB- stable, not needing albuterol inhaler. Able to due daily activities with out stopping to rest as long as she takes her time. Stopped Trelegy for now.   Cough- recurrent complaint, non productive, no current cough.   Able to play flute with difficulty.    8/6/2021- states feeling well, Cough- decreased, states mild, associated with post nasal drip, recurrent complaint, non productive, using Trelegy most days with noticed benfit,   Shortness of breath- recurrent complaint, improves with albuterol rescue inhaler but not needing, going to gym to exercise with no difficulty.   GERD symptoms controlled on medications.     1/26/21-  Complaint of depression currently followed by Psychiatry. Depression medications worsens acid reflux. GERD worsens Asthma symptoms.   Cough- worsened since changing medications, causes gaging,voice is hoarse in last 2 weeks. Occasionally productive clear mucous.   Associated with chest tightness.  No wheeze, SOB- worsened, worse when changing positions, feels palpitations in chest.     07/21/2020- patient is a 75-year-old female with atrial fib, DVT (2014) on pradaxa, kidney infarction, history of stroke, GERD, hyperlipidemia who follows with Dr. Bryson for asthma and chronic sinusitis.  She c/o chronic AVILA especially if she has to walk uphill or push baby stroller.  She is improved after using breo. She had URI symptoms in 1/2020 and thinks she may have had COVID-19 with emesis, loss of smell and tasted, aching for about 3 wks.  She had antibody test which was negative. Her  was sick as well. He assists w/ history.  She is a never smoker but says she has been diagnosed with COPD. Sinuses doing well, uses flonase intermittently. She follows w/ cardiology, has had 3 ablations in the past and sometimes still has a fib.  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    Mark 15, 2019- breo  With  No rescue use, uses flonase, had syncope with  Ankle  Fx.      Oct 16, 2018 pt having sob.  Pt had asthma as child and outgrew.  Pt has had agarwal 2 yrs, no seasonal variation, no clear nocturnal worsening.  Pt has been on intermittent amiodarone with eval /rx Dr Spaulding.  Pt had a fib resolved.  pft in 2014 with vc 35%.  Pt had 3 ablations. On chr xarelto.  No h/o pe.  No edema.  On aldactone.  Pt had transient right paresis - resolved - tia/stroke.    Pt in er 10/14- no wheezes, place empirically on prednisone 40/d with good response.  With albuterol use once breathing felt nl - 1st within last 1-2 yrs.        The chief complaint problem is stable    PFSH:  Past Medical History:   Diagnosis Date    Anticoagulant long-term use     Anxiety     Arthritis     Atrial fibrillation     Cancer     skin    CHF (congestive heart failure)     Depression     DVT (deep venous thrombosis)     GERD (gastroesophageal reflux disease)     Glaucoma (increased eye pressure)     Hyperlipidemia     diet controlled    Hypertension     Interstitial lung disease     Localized hives 1/10/2020    Mild persistent asthma without complication 11/12/2018    Pneumonia 1/31/2014    Stroke 6-3-14    Stroke     TIA (transient ischemic attack)     TIA (transient ischemic attack)          Past Surgical History:   Procedure Laterality Date    2 heart ablations       3 in total    A-V CARDIAC PACEMAKER INSERTION Left 10/14/2021    Procedure: INSERTION, CARDIAC PACEMAKER, DUAL CHAMBER;  Surgeon: Fco Calderon MD;  [facility] EP LAB;  Service: Cardiology;  Laterality: Left;  PAF/ [place] Arrthymias, Dual PPM, SJM, MAC, GP, 3 PREP    ANTERIOR VITRECTOMY Right 7/27/2022    Procedure: VITRECTOMY, ANTERIOR APPROACH;  Surgeon: Daniel Tan MD;  Location: [facility] OR;  Service: Ophthalmology;  Laterality: Right;    bilateral cataracts      CHOLECYSTECTOMY      COLONOSCOPY N/A 1/19/2017    Procedure: COLONOSCOPY and EGD;  Surgeon: Jonathan Schultz MD;  Location: [facility] ENDO;  Service: Endoscopy;  Laterality: N/A;    COLONOSCOPY N/A 10/10/2019    Procedure: COLONOSCOPY;  Surgeon: Alex Christian MD;  Location: [facility];  Service: Endoscopy;  Laterality: N/A;    ESOPHAGOGASTRODUODENOSCOPY N/A 10/14/2019    Procedure: EGD (ESOPHAGOGASTRODUODENOSCOPY);  Surgeon: Alex Christian MD;  Location: [facility];  Service: Endoscopy;  Laterality: N/A;    INJECTION OF ANESTHETIC AGENT AROUND MEDIAL BRANCH NERVES INNERVATING CERVICAL FACET JOINT Right 6/7/2018    Procedure: BLOCK, NERVE, FACET JOINT, MEDIAL BRANCH, CERVICAL;  Surgeon: Galo Clancy MD;  Location: [facility];  Service: Pain Management;  Laterality: Right;  C4, 5, 6    OPEN REDUCTION AND INTERNAL FIXATION (ORIF) OF INJURY OF ANKLE Right 11/1/2018    Procedure: ORIF, ANKLE;  Surgeon: Wilfredo Aguero MD;  Location: [facility];  Service: Orthopedics;  Laterality: Right;    PARACENTESIS, EYE, ANTERIOR CHAMBER, WITH AQUEOUS REMOVAL Right 7/27/2022    Procedure: Anterior chamber washout, possible IOL removal;  Surgeon: Daniel Tan MD;  Location: [facility] OR;  Service: Ophthalmology;  Laterality: Right;    pyloristenosis      RADIOFREQUENCY ABLATION OF LUMBAR MEDIAL BRANCH NERVE AT SINGLE LEVEL Bilateral 9/21/2018    Procedure: RADIOFREQUENCY ABLATION, NERVE, SPINAL, LUMBAR, MEDIAL BRANCH, 1 LEVEL;  Surgeon: Galo Clancy MD;  Location:  [facility] OR;  Service: Pain Management;  Laterality: Bilateral;  L3, 4, 5    RADIOFREQUENCY ABLATION OF LUMBAR MEDIAL BRANCH NERVE AT SINGLE LEVEL Bilateral 2/19/2019    Procedure: Radiofrequency Ablation, Nerve, Spinal, Lumbar, Medial Branch, 1 Level;  Surgeon: Galo Clancy MD;  Location: [facility] OR;  Service: Pain Management;  Laterality: Bilateral;  L3, 4, 5     RADIOFREQUENCY ABLATION OF LUMBAR MEDIAL BRANCH NERVE AT SINGLE LEVEL Bilateral 3/10/2020    Procedure: Radiofrequency Ablation, Nerve, Spinal, Lumbar, Medial Branch, 1 Level;  Surgeon: Galo Clancy MD;  Location: [facility] OR;  Service: Pain Management;  Laterality: Bilateral;  L3,4,5 - Burned at 80 degrees C. for 60 seconds x 2 each site      RADIOFREQUENCY ABLATION OF LUMBAR MEDIAL BRANCH NERVE AT SINGLE LEVEL Bilateral 9/11/2020    Procedure: Radiofrequency Ablation, Nerve, Spinal, Lumbar, Medial Branch, 1 Level;  Surgeon: Galo Clancy MD;  Location: [facility] OR;  Service: Pain Management;  Laterality: Bilateral;  L3, 4, 5 - Burned at 80 degrees C. for 60 seconds x 2 each site    RADIOFREQUENCY ABLATION OF LUMBAR MEDIAL BRANCH NERVE AT SINGLE LEVEL Bilateral 5/28/2021    Procedure: Radiofrequency Ablation, Nerve, Spinal, Lumbar, Medial Branch, 1 Level;  Surgeon: Galo Clancy MD;  Location: [facility] OR;  Service: Pain Management;  Laterality: Bilateral;  L3,4,5    RADIOFREQUENCY THERMAL COAGULATION OF MEDIAL BRANCH OF POSTERIOR RAMUS OF CERVICAL SPINAL NERVE Right 7/3/2018    Procedure: RADIOFREQUENCY THERMAL COAGULATION, NERVE, SPINAL, CERVICAL, MEDIAL BRANCH OF POSTERIOR RAMUS;  Surgeon: Galo Clancy MD;  Location: [facility] OR;  Service: Pain Management;  Laterality: Right;  C4,5,6 - Burned at 80 degrees C. for 75 seconds each site    RADIOFREQUENCY THERMAL COAGULATION OF MEDIAL BRANCH OF POSTERIOR RAMUS OF CERVICAL SPINAL NERVE Right 7/23/2019    Procedure: RADIOFREQUENCY THERMAL COAGULATION, NERVE, SPINAL, CERVICAL, POSTERIOR RAMUS, MEDIAL BRANCH;  Surgeon: Galo Clancy  MD;  Location: [facility] OR;  Service: Pain Management;  Laterality: Right;  C4,5,6    RADIOFREQUENCY THERMAL COAGULATION OF MEDIAL BRANCH OF POSTERIOR RAMUS OF CERVICAL SPINAL NERVE Right 6/23/2020    Procedure: RADIOFREQUENCY THERMAL COAGULATION, NERVE, SPINAL, CERVICAL, POSTERIOR RAMUS, MEDIAL BRANCH;  Surgeon: Galo Clancy MD;  Location: [facility] OR;  Service: Pain Management;  Laterality: Right;  C4, 5, 6    RADIOFREQUENCY THERMOCOAGULATION Bilateral 9/10/2019    Procedure: RADIOFREQUENCY THERMAL COAGULATION LUMBAR;  Surgeon: Galo Clancy MD;  Location: [facility] OR;  Service: Pain Management;  Laterality: Bilateral;  L3,4,5 - Burned at 80 degrees C. for 60 seconds x 2 each site    skin cancer removal       TONSILLECTOMY       Social History     Tobacco Use    Smoking status: Never Smoker    Smokeless tobacco: Never Used   Substance Use Topics    Alcohol use: No    Drug use: No     Family History   Problem Relation Age of Onset    Heart disease Father     Ulcers Father     Arthritis Mother     Asthma Mother     Rheum arthritis Mother     Pneumonia Mother     Depression Son     Alzheimer's disease Maternal Uncle     Rheum arthritis Maternal Grandmother     Emphysema Maternal Grandfather     Cancer Maternal Grandfather         kidney    Kidney disease Maternal Grandfather     Cancer Paternal Grandmother         lung= smoker    Pneumonia Paternal Grandfather     Breast cancer Neg Hx     Ovarian cancer Neg Hx      Review of patient's allergies indicates:   Allergen Reactions    Gabapentin Hallucinations    Xarelto [rivaroxaban] Rash    Bactrim [sulfamethoxazole-trimethoprim] Other (See Comments)     Upset stomach, dry heaves, confusion    Afrin (pseudoephedrine)     Amoxicillin-pot clavulanate      Other reaction(s): Mental Status Change    Atorvastatin      Other reaction(s): Joint pain    Baclofen     Baclofen (bulk) Nausea And Vomiting    Ciprofloxacin     Ciprofloxacin (bulk)     Decongest  "tabs      Other reaction(s): increased heart rate    Decongestant [pseudoephedrine hcl]     Erythromycin Other (See Comments)    Flecainide Hives     And SOB. No reaction to Lidocaine     Fluoxetine      Other reaction(s): heart palpitations  Other reaction(s): anxiety    Lisinopril Other (See Comments)     cough    Losartan Other (See Comments)     Hypotension with lightheadedness    Morphine Other (See Comments)     confusion    Tramadol Other (See Comments)     SOB, low BP    Venlafaxine     Venlafaxine analogues      Changes in BP and increases heart rate       Afrin [oxymetazoline] Palpitations    Caffeine Palpitations    Dabigatran etexilate Rash    Tizanidine Anxiety     dizziness       Performance Status:The patient's activity level is functions out of house.      Review of Systems:  a review of eleven systems covering constitutional, Eye, HEENT, Psych, Respiratory, Cardiac, GI, , Musculoskeletal, Endocrine, Dermatologic was negative except for pertinent findings as listed ABOVE and below:  Shortness of breath  fatigue    Exam:Comprehensive exam done. BP (!) 145/74 (BP Location: Right arm, Patient Position: Sitting, BP Method: Pediatric (Automatic))   Pulse 68   Ht 5' 7" (1.702 m)   Wt 45.4 kg (100 lb 1.4 oz)   SpO2 98% Comment: on room air at rest  BMI 15.68 kg/m²   Exam included Vitals as listed, and patient's appearance and affect and alertness and mood, oral exam for yeast and hygiene and pharynx lesions and Mallapatti (M) score, neck with inspection for jvd and masses and thyroid abnormalities and lymph nodes (supraclavicular and infraclavicular nodes and axillary also examined and noted if abn), chest exam included symmetry and effort and fremitus and percussion and auscultation, cardiac exam included rhythm and gallops and murmur and rubs and jvd and edema, abdominal exam for mass and hepatosplenomegaly and tenderness and hernias and bowel sounds, Musculoskeletal exam with muscle " tone and posture and mobility/gait and  strength, and skin for rashes and cyanosis and pallor and turgor, extremity for clubbing.  Findings were normal except for pertinent findings listed below:  M1, Thin, BS Clear   No edema    Radiographs (ct chest and cxr) reviewed: view by direct vision   X-Ray Chest AP Portable   11/15/2021 enlarged heart, lungs clear    XR CHEST AP PORTABLE  10/14/2021 mild pulmonary edema     CXR 1/23/20- cardiomegaly, prominence of interstitial markings, peribronchial cuffing, RLL  infiltrates  CTA chest 2014- mosaicism consistent with air trapping, bibasilar infiltrates, small right pleural effusion, cardiomegaly with enlarged RV    Labs reviewed    Results for AYLA SWIFT (MRN [id number]) as of 7/21/2020 09:27   Ref. Range 6/11/2020 11:33   Eosinophil% Latest Ref Range: 0.0 - 8.0 % 4.2   Eos # Latest Ref Range: 0.0 - 0.5 K/uL 0.4    Results for AYLA SWIFT (MRN [id number]) as of 7/21/2020 09:27   Ref. Range 10/14/2019 00:12   Mono # Latest Ref Range: 0.3 - 1.0 K/uL 2.0 (H)   Eosinophil% Latest Ref Range: 0.0 - 8.0 % 0.8       PFT results reviewed 2014- spirometry shows severe restriction.  Lung volumes and DLCO were not performed       EPWORTH SLEEPINESS SCALE 8/9/2022 score 12    Plan:  Clinical impression is apparently straight forward and impression with management as below.     Ayla was seen today for 6m f/u and fatigue.    Diagnoses and all orders for this visit:    Daytime somnolence  -     PULSE OXIMETRY OVERNIGHT; Future    Sleep apnea, unspecified type  -     PULSE OXIMETRY OVERNIGHT; Future     - AT Home sleep study    Follow up in about 3 months (around 11/9/2022), or if symptoms worsen or fail to improve.    Discussed with patient above for education the following:      Patient Instructions   Ordering overnight sleep study at home to evaluate for sleep apnea    If positive will order CPAP machine, notify clinic when machine is delivered to home to set  up 31 days compliance appointment. You must use the machine for a least 4 hours every night.     Overnight pulse test to evaluate for supplemental oxygen

## 2022-08-10 ENCOUNTER — TELEPHONE (OUTPATIENT)
Dept: PULMONOLOGY | Facility: CLINIC | Age: 77
End: 2022-08-10
Payer: MEDICARE

## 2022-08-11 ENCOUNTER — TELEPHONE (OUTPATIENT)
Dept: PULMONOLOGY | Facility: CLINIC | Age: 77
End: 2022-08-11
Payer: MEDICARE

## 2022-08-12 ENCOUNTER — LAB VISIT (OUTPATIENT)
Dept: LAB | Facility: HOSPITAL | Age: 77
End: 2022-08-12
Attending: INTERNAL MEDICINE
Payer: MEDICARE

## 2022-08-12 DIAGNOSIS — I11.0 HYPERTENSIVE HEART DISEASE WITH CONGESTIVE HEART FAILURE: ICD-10-CM

## 2022-08-12 DIAGNOSIS — I48.0 PAROXYSMAL ATRIAL FIBRILLATION: ICD-10-CM

## 2022-08-12 DIAGNOSIS — I50.32 CHRONIC DIASTOLIC HEART FAILURE: Primary | ICD-10-CM

## 2022-08-12 LAB — BNP SERPL-MCNC: 90 PG/ML (ref 0–99)

## 2022-08-12 PROCEDURE — 83880 ASSAY OF NATRIURETIC PEPTIDE: CPT | Performed by: INTERNAL MEDICINE

## 2022-08-12 PROCEDURE — 36415 COLL VENOUS BLD VENIPUNCTURE: CPT | Performed by: INTERNAL MEDICINE

## 2022-08-16 ENCOUNTER — TELEPHONE (OUTPATIENT)
Dept: PULMONOLOGY | Facility: CLINIC | Age: 77
End: 2022-08-16
Payer: MEDICARE

## 2022-08-16 NOTE — TELEPHONE ENCOUNTER
Pt notified via portal    ----- Message from Marichuy Mcelroy NP sent at 8/16/2022  8:40 AM CDT -----  Overnight test is good. Oxygen desaturation and sleep apnea not seen

## 2022-08-19 ENCOUNTER — OFFICE VISIT (OUTPATIENT)
Dept: FAMILY MEDICINE | Facility: CLINIC | Age: 77
End: 2022-08-19
Attending: FAMILY MEDICINE
Payer: MEDICARE

## 2022-08-19 VITALS
HEIGHT: 67 IN | HEART RATE: 64 BPM | SYSTOLIC BLOOD PRESSURE: 111 MMHG | TEMPERATURE: 98 F | WEIGHT: 100.88 LBS | DIASTOLIC BLOOD PRESSURE: 70 MMHG | RESPIRATION RATE: 17 BRPM | OXYGEN SATURATION: 98 % | BODY MASS INDEX: 15.83 KG/M2

## 2022-08-19 DIAGNOSIS — I27.20 PULMONARY HYPERTENSION: ICD-10-CM

## 2022-08-19 DIAGNOSIS — R54 FRAIL ELDERLY: ICD-10-CM

## 2022-08-19 DIAGNOSIS — R79.89 ELEVATED LFTS: ICD-10-CM

## 2022-08-19 DIAGNOSIS — R63.6 UNDERWEIGHT: ICD-10-CM

## 2022-08-19 DIAGNOSIS — H43.11 VITREOUS HEMORRHAGE, RIGHT EYE: ICD-10-CM

## 2022-08-19 DIAGNOSIS — M47.896 OTHER SPONDYLOSIS, LUMBAR REGION: ICD-10-CM

## 2022-08-19 DIAGNOSIS — J84.9 INTERSTITIAL LUNG DISEASE: ICD-10-CM

## 2022-08-19 DIAGNOSIS — H21.01 HYPHEMA, RIGHT EYE: ICD-10-CM

## 2022-08-19 DIAGNOSIS — J45.20 MILD INTERMITTENT ASTHMA WITHOUT COMPLICATION: ICD-10-CM

## 2022-08-19 DIAGNOSIS — F41.1 GAD (GENERALIZED ANXIETY DISORDER): ICD-10-CM

## 2022-08-19 DIAGNOSIS — E78.2 MIXED HYPERLIPIDEMIA: ICD-10-CM

## 2022-08-19 DIAGNOSIS — Z95.0 CARDIAC PACEMAKER IN SITU: ICD-10-CM

## 2022-08-19 DIAGNOSIS — M47.892 OTHER SPONDYLOSIS, CERVICAL REGION: Primary | ICD-10-CM

## 2022-08-19 DIAGNOSIS — R41.89 COGNITIVE IMPAIRMENT: ICD-10-CM

## 2022-08-19 DIAGNOSIS — I48.0 PAROXYSMAL ATRIAL FIBRILLATION: ICD-10-CM

## 2022-08-19 DIAGNOSIS — K21.9 GASTROESOPHAGEAL REFLUX DISEASE WITHOUT ESOPHAGITIS: ICD-10-CM

## 2022-08-19 DIAGNOSIS — F33.41 RECURRENT MAJOR DEPRESSIVE DISORDER, IN PARTIAL REMISSION: ICD-10-CM

## 2022-08-19 PROBLEM — M48.8X6 OTHER SPECIFIED SPONDYLOPATHIES, LUMBAR REGION: Status: RESOLVED | Noted: 2020-01-23 | Resolved: 2022-08-19

## 2022-08-19 PROBLEM — N28.0 RENAL INFARCT: Status: RESOLVED | Noted: 2019-10-14 | Resolved: 2022-08-19

## 2022-08-19 PROCEDURE — 1159F MED LIST DOCD IN RCRD: CPT | Mod: CPTII,S$GLB,, | Performed by: FAMILY MEDICINE

## 2022-08-19 PROCEDURE — 1157F ADVNC CARE PLAN IN RCRD: CPT | Mod: CPTII,S$GLB,, | Performed by: FAMILY MEDICINE

## 2022-08-19 PROCEDURE — 1160F PR REVIEW ALL MEDS BY PRESCRIBER/CLIN PHARMACIST DOCUMENTED: ICD-10-PCS | Mod: CPTII,S$GLB,, | Performed by: FAMILY MEDICINE

## 2022-08-19 PROCEDURE — 3074F SYST BP LT 130 MM HG: CPT | Mod: CPTII,S$GLB,, | Performed by: FAMILY MEDICINE

## 2022-08-19 PROCEDURE — 99999 PR PBB SHADOW E&M-EST. PATIENT-LVL IV: CPT | Mod: PBBFAC,,, | Performed by: FAMILY MEDICINE

## 2022-08-19 PROCEDURE — 99214 PR OFFICE/OUTPT VISIT, EST, LEVL IV, 30-39 MIN: ICD-10-PCS | Mod: S$GLB,,, | Performed by: FAMILY MEDICINE

## 2022-08-19 PROCEDURE — 99999 PR PBB SHADOW E&M-EST. PATIENT-LVL IV: ICD-10-PCS | Mod: PBBFAC,,, | Performed by: FAMILY MEDICINE

## 2022-08-19 PROCEDURE — 1126F AMNT PAIN NOTED NONE PRSNT: CPT | Mod: CPTII,S$GLB,, | Performed by: FAMILY MEDICINE

## 2022-08-19 PROCEDURE — 3074F PR MOST RECENT SYSTOLIC BLOOD PRESSURE < 130 MM HG: ICD-10-PCS | Mod: CPTII,S$GLB,, | Performed by: FAMILY MEDICINE

## 2022-08-19 PROCEDURE — 3078F PR MOST RECENT DIASTOLIC BLOOD PRESSURE < 80 MM HG: ICD-10-PCS | Mod: CPTII,S$GLB,, | Performed by: FAMILY MEDICINE

## 2022-08-19 PROCEDURE — 1101F PT FALLS ASSESS-DOCD LE1/YR: CPT | Mod: CPTII,S$GLB,, | Performed by: FAMILY MEDICINE

## 2022-08-19 PROCEDURE — 3288F FALL RISK ASSESSMENT DOCD: CPT | Mod: CPTII,S$GLB,, | Performed by: FAMILY MEDICINE

## 2022-08-19 PROCEDURE — 1101F PR PT FALLS ASSESS DOC 0-1 FALLS W/OUT INJ PAST YR: ICD-10-PCS | Mod: CPTII,S$GLB,, | Performed by: FAMILY MEDICINE

## 2022-08-19 PROCEDURE — 1159F PR MEDICATION LIST DOCUMENTED IN MEDICAL RECORD: ICD-10-PCS | Mod: CPTII,S$GLB,, | Performed by: FAMILY MEDICINE

## 2022-08-19 PROCEDURE — 3288F PR FALLS RISK ASSESSMENT DOCUMENTED: ICD-10-PCS | Mod: CPTII,S$GLB,, | Performed by: FAMILY MEDICINE

## 2022-08-19 PROCEDURE — 1160F RVW MEDS BY RX/DR IN RCRD: CPT | Mod: CPTII,S$GLB,, | Performed by: FAMILY MEDICINE

## 2022-08-19 PROCEDURE — 1157F PR ADVANCE CARE PLAN OR EQUIV PRESENT IN MEDICAL RECORD: ICD-10-PCS | Mod: CPTII,S$GLB,, | Performed by: FAMILY MEDICINE

## 2022-08-19 PROCEDURE — 3078F DIAST BP <80 MM HG: CPT | Mod: CPTII,S$GLB,, | Performed by: FAMILY MEDICINE

## 2022-08-19 PROCEDURE — 1126F PR PAIN SEVERITY QUANTIFIED, NO PAIN PRESENT: ICD-10-PCS | Mod: CPTII,S$GLB,, | Performed by: FAMILY MEDICINE

## 2022-08-19 PROCEDURE — 99214 OFFICE O/P EST MOD 30 MIN: CPT | Mod: S$GLB,,, | Performed by: FAMILY MEDICINE

## 2022-08-19 RX ORDER — AMITRIPTYLINE HYDROCHLORIDE 25 MG/1
25 TABLET, FILM COATED ORAL NIGHTLY
COMMUNITY
Start: 2022-08-09 | End: 2022-12-11 | Stop reason: SDUPTHER

## 2022-08-19 NOTE — PROGRESS NOTES
Subjective:       Patient ID: Ayla Dela Cruz is a 77 y.o. female.    Chief Complaint: Annual Exam (Pt states that she is here for her annual exam )    7-year-old female coming in for her six month follow-up.  She has a history of previous stroke, spondylosis of lumbar and cervical spine, cognitive impairment, generalized anxiety disorder and major depression, glaucoma, she has been treated for hyphema of the right eye and her vision has recovered following a vitreous hemorrhage.  She has interstitial lung disease, mild intermittent asthma, paroxysmal atrial fibrillation chads five status post three ablation is a on chronic Eliquis which contributed to the hyphema.  She has hypertension, hyperlipidemia, diastolic CHF, reflux, mildly elevated liver function test and osteoarthritis.  She is coming due for a colonoscopy in October and she, her , and I will discussed it.  Were going to put that on hold and let them consider it and revisit the question at her next checkup in February.    Her  states that she has been going to the gym with him in becoming more physically active.  She has dropped 5 lb from her last visit with me in February but he believe she is putting on a little weight recently and she has had an improvement in her appetite.  Her psychiatric provider has put her on Elavil and he is worried about over medication in duplication.  I had put her on a low dose of mirtazapine and I would suggest discontinuing that with the Elavil on board.    Past Medical History:  No date: Anticoagulant long-term use  No date: Anxiety  No date: Arthritis  No date: Atrial fibrillation  No date: Cancer      Comment:  skin  No date: CHF (congestive heart failure)  No date: Depression  No date: DVT (deep venous thrombosis)  No date: GERD (gastroesophageal reflux disease)  No date: Glaucoma (increased eye pressure)  No date: Hyperlipidemia      Comment:  diet controlled  No date: Hypertension  No date:  Interstitial lung disease  1/10/2020: Localized hives  11/12/2018: Mild persistent asthma without complication  1/31/2014: Pneumonia  6-3-14: Stroke  No date: Stroke  No date: TIA (transient ischemic attack)  No date: TIA (transient ischemic attack)    Past Surgical History:  No date: 2 heart ablations      Comment:  3 in total  10/14/2021: A-V CARDIAC PACEMAKER INSERTION; Left      Comment:  Procedure: INSERTION, CARDIAC PACEMAKER, DUAL CHAMBER;                 Surgeon: Fco Calderon MD;  Location: [facility] EP LAB;                 Service: Cardiology;  Laterality: Left;  PAF/ [place]                Arrthymias, Dual PPM, SJM, MAC, GP, 3 PREP  7/27/2022: ANTERIOR VITRECTOMY; Right      Comment:  Procedure: VITRECTOMY, ANTERIOR APPROACH;  Surgeon:                Daniel Tan MD;  Location: [facility];  Service:                Ophthalmology;  Laterality: Right;  No date: bilateral cataracts  No date: CHOLECYSTECTOMY  1/19/2017: COLONOSCOPY; N/A      Comment:  Procedure: COLONOSCOPY and EGD;  Surgeon: Jonathan Schultz MD;  Location: [facility] ENDO;  Service: Endoscopy;                 Laterality: N/A;  10/10/2019: COLONOSCOPY; N/A      Comment:  Procedure: COLONOSCOPY;  Surgeon: Alex Christian MD;                Location: [facility] ENDO;  Service: Endoscopy;  Laterality:                N/A;  10/14/2019: ESOPHAGOGASTRODUODENOSCOPY; N/A      Comment:  Procedure: EGD (ESOPHAGOGASTRODUODENOSCOPY);  Surgeon:                Alex Christian MD;  Location: [facility] ENDO;  Service:                Endoscopy;  Laterality: N/A;  6/7/2018: INJECTION OF ANESTHETIC AGENT AROUND MEDIAL BRANCH NERVES   INNERVATING CERVICAL FACET JOINT; Right      Comment:  Procedure: BLOCK, NERVE, FACET JOINT, MEDIAL BRANCH,                CERVICAL;  Surgeon: Galo Clancy MD;  Location: [facility] OR;                 Service: Pain Management;  Laterality: Right;  C4, 5, 6  11/1/2018: OPEN REDUCTION AND INTERNAL FIXATION (ORIF) OF INJURY OF   ANKLE; Right       Comment:  Procedure: ORIF, ANKLE;  Surgeon: Wilfredo Aguero MD;                Location: [facility] OR;  Service: Orthopedics;  Laterality:                Right;  7/27/2022: PARACENTESIS, EYE, ANTERIOR CHAMBER, WITH AQUEOUS REMOVAL;   Right      Comment:  Procedure: Anterior chamber washout, possible IOL                removal;  Surgeon: Daniel Tan MD;  Location: [facility]                OR;  Service: Ophthalmology;  Laterality: Right;  No date: pyloristenosis  9/21/2018: RADIOFREQUENCY ABLATION OF LUMBAR MEDIAL BRANCH NERVE AT   SINGLE LEVEL; Bilateral      Comment:  Procedure: RADIOFREQUENCY ABLATION, NERVE, SPINAL,                LUMBAR, MEDIAL BRANCH, 1 LEVEL;  Surgeon: Galo Clancy MD;               Location: [facility];  Service: Pain Management;                 Laterality: Bilateral;  L3, 4, 5  2/19/2019: RADIOFREQUENCY ABLATION OF LUMBAR MEDIAL BRANCH NERVE AT   SINGLE LEVEL; Bilateral      Comment:  Procedure: Radiofrequency Ablation, Nerve, Spinal,                Lumbar, Medial Branch, 1 Level;  Surgeon: Galo Clancy MD;               Location: [facility] OR;  Service: Pain Management;                 Laterality: Bilateral;  L3, 4, 5   3/10/2020: RADIOFREQUENCY ABLATION OF LUMBAR MEDIAL BRANCH NERVE AT   SINGLE LEVEL; Bilateral      Comment:  Procedure: Radiofrequency Ablation, Nerve, Spinal,                Lumbar, Medial Branch, 1 Level;  Surgeon: Galo Clancy MD;               Location: [facility] OR;  Service: Pain Management;                 Laterality: Bilateral;  L3,4,5 - Burned at 80 degrees C.                for 60 seconds x 2 each site    9/11/2020: RADIOFREQUENCY ABLATION OF LUMBAR MEDIAL BRANCH NERVE AT   SINGLE LEVEL; Bilateral      Comment:  Procedure: Radiofrequency Ablation, Nerve, Spinal,                Lumbar, Medial Branch, 1 Level;  Surgeon: Galo Clancy MD;               Location: [facility];  Service: Pain Management;                 Laterality: Bilateral;  L3, 4, 5 - Burned at 80 degrees                C. for  60 seconds x 2 each site  5/28/2021: RADIOFREQUENCY ABLATION OF LUMBAR MEDIAL BRANCH NERVE AT   SINGLE LEVEL; Bilateral      Comment:  Procedure: Radiofrequency Ablation, Nerve, Spinal,                Lumbar, Medial Branch, 1 Level;  Surgeon: Galo Clancy MD;               Location: [facility] OR;  Service: Pain Management;                 Laterality: Bilateral;  L3,4,5  7/3/2018: RADIOFREQUENCY THERMAL COAGULATION OF MEDIAL BRANCH OF   POSTERIOR RAMUS OF CERVICAL SPINAL NERVE; Right      Comment:  Procedure: RADIOFREQUENCY THERMAL COAGULATION, NERVE,                SPINAL, CERVICAL, MEDIAL BRANCH OF POSTERIOR RAMUS;                 Surgeon: Galo Clancy MD;  Location: [facility] OR;  Service:                Pain Management;  Laterality: Right;  C4,5,6 - Burned at                80 degrees C. for 75 seconds each site  7/23/2019: RADIOFREQUENCY THERMAL COAGULATION OF MEDIAL BRANCH OF   POSTERIOR RAMUS OF CERVICAL SPINAL NERVE; Right      Comment:  Procedure: RADIOFREQUENCY THERMAL COAGULATION, NERVE,                SPINAL, CERVICAL, POSTERIOR RAMUS, MEDIAL BRANCH;                 Surgeon: Galo Clancy MD;  Location: [facility] OR;  Service:                Pain Management;  Laterality: Right;  C4,5,6  6/23/2020: RADIOFREQUENCY THERMAL COAGULATION OF MEDIAL BRANCH OF   POSTERIOR RAMUS OF CERVICAL SPINAL NERVE; Right      Comment:  Procedure: RADIOFREQUENCY THERMAL COAGULATION, NERVE,                SPINAL, CERVICAL, POSTERIOR RAMUS, MEDIAL BRANCH;                 Surgeon: Galo Clancy MD;  Location: [facility] OR;  Service:                Pain Management;  Laterality: Right;  C4, 5, 6  9/10/2019: RADIOFREQUENCY THERMOCOAGULATION; Bilateral      Comment:  Procedure: RADIOFREQUENCY THERMAL COAGULATION LUMBAR;                 Surgeon: Galo Clancy MD;  Location: [facility] OR;  Service:                Pain Management;  Laterality: Bilateral;  L3,4,5 - Burned               at 80 degrees C. for 60 seconds x 2 each site  No date: skin cancer removal   No  date: TONSILLECTOMY    Current Outpatient Medications on File Prior to Visit:  amitriptyline (ELAVIL) 25 MG tablet, Take 25 mg by mouth nightly., Disp: , Rfl:   apixaban (ELIQUIS) 5 mg Tab, Take 1 tablet (5 mg total) by mouth 2 (two) times daily., Disp: 60 tablet, Rfl: 11  ARIPiprazole (ABILIFY) 15 MG Tab, Take 15 mg by mouth once daily., Disp: , Rfl:   aspirin 81 MG Chew, TAKE 1 TABLET BY MOUTH EVERY DAY, Disp: 90 tablet, Rfl: 4  ATIVAN 0.5 mg tablet, Take 0.5 mg by mouth 2 (two) times daily as needed for Anxiety. , Disp: , Rfl:   atropine 1% (ISOPTO ATROPINE) 1 % Drop, Place 1 drop into the right eye once daily., Disp: , Rfl:   busPIRone (BUSPAR) 10 MG tablet, Take 10 mg by mouth 3 (three) times daily., Disp: , Rfl:   dorzolamide-timolol 2-0.5% (COSOPT) 22.3-6.8 mg/mL ophthalmic solution, Place into both eyes., Disp: , Rfl:   metoprolol tartrate (LOPRESSOR) 25 MG tablet, Take 1 tablet (25 mg total) by mouth 2 (two) times daily., Disp: 180 tablet, Rfl: 2  pantoprazole (PROTONIX) 40 MG tablet, Take 1 tablet (40 mg total) by mouth once daily., Disp: 90 tablet, Rfl: 3  prednisoLONE acetate (PRED FORTE) 1 % DrpS, Place 1 drop into the right eye every 4 (four) hours., Disp: 5 mL, Rfl: 3  rosuvastatin (CRESTOR) 40 MG Tab, TAKE 1 TABLET (40 MG TOTAL) BY MOUTH EVERY EVENING, Disp: 90 tablet, Rfl: 3  tobramycin sulfate 0.3% (TOBREX) 0.3 % ophthalmic solution, Place 1 drop into the right eye 4 (four) times daily. Start after eye surgery., Disp: 5 mL, Rfl: 0  TRINTELLIX 10 mg Tab, Take 1 tablet by mouth nightly., Disp: , Rfl:   (DISCONTINUED) mirtazapine (REMERON) 7.5 MG Tab, Take 1 tablet (7.5 mg total) by mouth every evening., Disp: 30 tablet, Rfl: 5  (DISCONTINUED) brimonidine 0.2% (ALPHAGAN) 0.2 % Drop, Place 1 drop into the right eye 3 (three) times daily., Disp: 5 mL, Rfl: 3    Current Facility-Administered Medications on File Prior to Visit:  bupivacaine (PF) 0.25% (2.5 mg/ml) injection, , , PRN, Galo Clancy MD, 6 mL  at 02/19/19 1314  lidocaine (PF) 10 mg/ml (1%) injection, , , PRN, Galo Clancy MD, 9 mL at 02/19/19 1304  lidocaine (PF) 20 mg/ml (2%) injection, , , PRN, Galo Clancy MD, 6 mL at 02/19/19 1310  methylPREDNISolone acetate injection, , , PRN, Galo Clancy MD, 80 mg at 02/19/19 1314          Review of Systems   Constitutional: Positive for activity change and appetite change. Negative for chills, fatigue and fever.   HENT: Negative for congestion, postnasal drip and rhinorrhea.    Respiratory: Negative for cough, chest tightness and shortness of breath.    Cardiovascular: Negative for chest pain and palpitations.   Gastrointestinal: Negative for abdominal pain, blood in stool, constipation, diarrhea, nausea and vomiting.   Endocrine: Negative for polydipsia and polyuria.   Genitourinary: Negative for frequency and hematuria.   Musculoskeletal: Positive for back pain, neck pain and neck stiffness (She is getting some contracture with a neck pulling to the right side probably developing some mild torticollis.  They are using a collar at night.  We discussed other options and he actually has an over the door traction device from an old injury that ja).   Skin: Negative for color change and rash.       Objective:      Physical Exam  Vitals and nursing note reviewed.   Constitutional:       General: She is not in acute distress.     Appearance: Normal appearance. She is well-developed. She is not ill-appearing, toxic-appearing or diaphoretic.      Comments: Good blood pressure control   Normal pulse with regular rhythm   Underweight with a BMI of 15.8 she is down 5 lb overall from her February 16, 2022 visit   HENT:      Head: Normocephalic and atraumatic.      Right Ear: Tympanic membrane, ear canal and external ear normal. There is no impacted cerumen.      Left Ear: Tympanic membrane, ear canal and external ear normal. There is no impacted cerumen.      Nose: Nose normal. No congestion or rhinorrhea.      Mouth/Throat:       Mouth: Mucous membranes are moist.      Pharynx: Oropharynx is clear. No oropharyngeal exudate or posterior oropharyngeal erythema.   Eyes:      General: No scleral icterus.        Right eye: No discharge.         Left eye: No discharge.      Extraocular Movements: Extraocular movements intact.      Conjunctiva/sclera: Conjunctivae normal.      Pupils: Pupils are equal, round, and reactive to light.   Neck:      Thyroid: No thyromegaly.      Vascular: No carotid bruit or JVD.   Cardiovascular:      Rate and Rhythm: Normal rate and regular rhythm.      Heart sounds: Normal heart sounds. No murmur heard.    No friction rub. No gallop.   Pulmonary:      Effort: Pulmonary effort is normal. No respiratory distress.      Breath sounds: Normal breath sounds. No stridor. No wheezing, rhonchi or rales.   Chest:      Chest wall: No tenderness.   Abdominal:      General: Bowel sounds are normal. There is no distension.      Palpations: Abdomen is soft. There is no mass.      Tenderness: There is no abdominal tenderness. There is no guarding or rebound.      Hernia: No hernia is present.   Musculoskeletal:         General: No swelling, tenderness, deformity or signs of injury. Normal range of motion.      Cervical back: Rigidity (Mild tendency to pull to the right side but she can straighten up) present. No tenderness.      Right lower leg: No edema.      Left lower leg: No edema.   Lymphadenopathy:      Cervical: No cervical adenopathy.   Skin:     General: Skin is warm and dry.      Coloration: Skin is not jaundiced or pale.      Findings: No bruising, erythema, lesion or rash.   Neurological:      General: No focal deficit present.      Mental Status: She is alert and oriented to person, place, and time.      Cranial Nerves: No cranial nerve deficit.      Deep Tendon Reflexes: Reflexes are normal and symmetric.         Assessment:       1. Other spondylosis, cervical region    2. Other spondylosis, lumbar region    3.  Cognitive impairment    4. Recurrent major depressive disorder, in partial remission    5. JOSE (generalized anxiety disorder)    6. Vitreous hemorrhage, right eye    7. Hyphema, right eye    8. Pulmonary hypertension, with right sided congestive heart failure    9. Mild intermittent asthma without complication    10. Interstitial lung disease    11. Paroxysmal atrial fibrillation, ablations X 3 1995, 1997, 9/2017, CHADS-VAS score 5, HAS-BLED score 5     12. Mixed hyperlipidemia    13. Cardiac pacemaker in situ, [employer], dual chamber, 10/14/2021    14. Gastroesophageal reflux disease without esophagitis    15. Elevated LFTs    16. Frail elderly    17. Underweight        Plan:       1. Other spondylosis, cervical region  Recommend use neck collar intermittently, it is okay to use it through the night but if use continuously through the day she may become dependent on it and actually lose muscle strength.  Likewise the over the door traction can be used but not continuously for long periods and she will have to be monitored with its use to avoid injury    2. Other spondylosis, lumbar region  Stable    3. Cognitive impairment  Stable and unchanged    4. Recurrent major depressive disorder, in partial remission  Recommend discontinue the mirtazapine/Remeron and continue with her psychiatric providers choice    5. JOSE (generalized anxiety disorder)  Stable on BuSpar with p.r.n. lorazepam    6. Vitreous hemorrhage, right eye  Status post washout and recovery    7. Hyphema, right eye  See above    8. Pulmonary hypertension, with right sided congestive heart failure  Followed by Cardiology    9. Mild intermittent asthma without complication  Followed by Pulmonary asymptomatic at present    10. Interstitial lung disease  Asymptomatic at present    11. Paroxysmal atrial fibrillation, ablations X 3 1995, 1997, 9/2017, CHADS-VAS score 5, HAS-BLED score 5   Followed by Cardiology, consider reduction of Eliquis to 2.5  b.i.d.    12. Mixed hyperlipidemia  Will recheck in February at her general exam    13. Cardiac pacemaker in situ, [employer], dual chamber, 10/14/2021  Stable followed by Cardiology    14. Gastroesophageal reflux disease without esophagitis  Asymptomatic    15. Elevated LFTs  Mild elevations, will continue to monitor.  Ultrasound was negative for any sign of liver tumor on last check    16. Frail elderly    17. Underweight      I spent 30 minutes on this encounter.  This time includes face-to-face time, orders, chart review, test review, and documentation.

## 2022-08-22 ENCOUNTER — TELEPHONE (OUTPATIENT)
Dept: OPHTHALMOLOGY | Facility: CLINIC | Age: 77
End: 2022-08-22
Payer: MEDICARE

## 2022-08-22 NOTE — TELEPHONE ENCOUNTER
Spoke to Dr white's office and gave phone number for medical records office     -TD ----- Message from Ethel Lucero sent at 8/22/2022  1:06 PM CDT -----  Contact: [facility] Dr. [place]  Type: Patient Call Back         Who called: [facility] - Dr. [place] Team         What is the request in detail: saw patient today; requesting all med recs for patient to be faxed over; please advise         Best call back number: [phone]          Additional Information: fax [phone]           Thank You

## 2022-08-29 ENCOUNTER — TELEPHONE (OUTPATIENT)
Dept: CARDIOLOGY | Facility: CLINIC | Age: 77
End: 2022-08-29
Payer: MEDICARE

## 2022-09-06 ENCOUNTER — OFFICE VISIT (OUTPATIENT)
Dept: OPHTHALMOLOGY | Facility: CLINIC | Age: 77
End: 2022-09-06
Payer: MEDICARE

## 2022-09-06 DIAGNOSIS — Z98.890 STATUS POST EYE SURGERY: Primary | ICD-10-CM

## 2022-09-06 PROCEDURE — 1160F PR REVIEW ALL MEDS BY PRESCRIBER/CLIN PHARMACIST DOCUMENTED: ICD-10-PCS | Mod: CPTII,S$GLB,, | Performed by: OPHTHALMOLOGY

## 2022-09-06 PROCEDURE — 1159F PR MEDICATION LIST DOCUMENTED IN MEDICAL RECORD: ICD-10-PCS | Mod: CPTII,S$GLB,, | Performed by: OPHTHALMOLOGY

## 2022-09-06 PROCEDURE — 3288F PR FALLS RISK ASSESSMENT DOCUMENTED: ICD-10-PCS | Mod: CPTII,S$GLB,, | Performed by: OPHTHALMOLOGY

## 2022-09-06 PROCEDURE — 1126F AMNT PAIN NOTED NONE PRSNT: CPT | Mod: CPTII,S$GLB,, | Performed by: OPHTHALMOLOGY

## 2022-09-06 PROCEDURE — 99024 PR POST-OP FOLLOW-UP VISIT: ICD-10-PCS | Mod: S$GLB,,, | Performed by: OPHTHALMOLOGY

## 2022-09-06 PROCEDURE — 1126F PR PAIN SEVERITY QUANTIFIED, NO PAIN PRESENT: ICD-10-PCS | Mod: CPTII,S$GLB,, | Performed by: OPHTHALMOLOGY

## 2022-09-06 PROCEDURE — 99999 PR PBB SHADOW E&M-EST. PATIENT-LVL III: ICD-10-PCS | Mod: PBBFAC,,, | Performed by: OPHTHALMOLOGY

## 2022-09-06 PROCEDURE — 1160F RVW MEDS BY RX/DR IN RCRD: CPT | Mod: CPTII,S$GLB,, | Performed by: OPHTHALMOLOGY

## 2022-09-06 PROCEDURE — 99999 PR PBB SHADOW E&M-EST. PATIENT-LVL III: CPT | Mod: PBBFAC,,, | Performed by: OPHTHALMOLOGY

## 2022-09-06 PROCEDURE — 1157F ADVNC CARE PLAN IN RCRD: CPT | Mod: CPTII,S$GLB,, | Performed by: OPHTHALMOLOGY

## 2022-09-06 PROCEDURE — 1157F PR ADVANCE CARE PLAN OR EQUIV PRESENT IN MEDICAL RECORD: ICD-10-PCS | Mod: CPTII,S$GLB,, | Performed by: OPHTHALMOLOGY

## 2022-09-06 PROCEDURE — 1101F PR PT FALLS ASSESS DOC 0-1 FALLS W/OUT INJ PAST YR: ICD-10-PCS | Mod: CPTII,S$GLB,, | Performed by: OPHTHALMOLOGY

## 2022-09-06 PROCEDURE — 3288F FALL RISK ASSESSMENT DOCD: CPT | Mod: CPTII,S$GLB,, | Performed by: OPHTHALMOLOGY

## 2022-09-06 PROCEDURE — 1101F PT FALLS ASSESS-DOCD LE1/YR: CPT | Mod: CPTII,S$GLB,, | Performed by: OPHTHALMOLOGY

## 2022-09-06 PROCEDURE — 99024 POSTOP FOLLOW-UP VISIT: CPT | Mod: S$GLB,,, | Performed by: OPHTHALMOLOGY

## 2022-09-06 PROCEDURE — 1159F MED LIST DOCD IN RCRD: CPT | Mod: CPTII,S$GLB,, | Performed by: OPHTHALMOLOGY

## 2022-09-06 NOTE — PROGRESS NOTES
HPI    Patient presents for follow up for AC washout OD.     States OD is doing well, no current ocular complaints.     Atropine BID OD   Cosopt BID OS   ATS PRN OU   Last edited by Yamileth Vzaquez on 9/6/2022  1:51 PM.            Assessment /Plan     For exam results, see Encounter Report.    Status post eye surgery    POW6 AC washout/iridectomy  Doing well, AC quiet  IOP creeping upward    Restart dorz/rm bid OD (in addition to OS)  Continue atropine bid OD    F/u 4 months, HVF, IOP check    Request HVF records, [facility] records    Addendum:   Received some records from Dr. Pillai.    POAG mild stage  HVF records OD from [id number] showing mild changes, no other HVF records sent.  Pachy 572/541, Tbase 20/18, target 16

## 2022-09-08 ENCOUNTER — CLINICAL SUPPORT (OUTPATIENT)
Dept: CARDIOLOGY | Facility: HOSPITAL | Age: 77
End: 2022-09-08
Attending: INTERNAL MEDICINE
Payer: MEDICARE

## 2022-09-08 ENCOUNTER — TELEPHONE (OUTPATIENT)
Dept: CARDIOLOGY | Facility: CLINIC | Age: 77
End: 2022-09-08
Payer: MEDICARE

## 2022-09-08 VITALS — BODY MASS INDEX: 15.7 KG/M2 | WEIGHT: 100 LBS | HEIGHT: 67 IN

## 2022-09-08 DIAGNOSIS — R79.89 ELEVATED BRAIN NATRIURETIC PEPTIDE (BNP) LEVEL: ICD-10-CM

## 2022-09-08 DIAGNOSIS — I48.0 PAROXYSMAL ATRIAL FIBRILLATION: ICD-10-CM

## 2022-09-08 DIAGNOSIS — I27.20 PULMONARY HYPERTENSION: ICD-10-CM

## 2022-09-08 DIAGNOSIS — I50.32 CHRONIC DIASTOLIC HEART FAILURE: ICD-10-CM

## 2022-09-08 LAB
AORTIC ROOT ANNULUS: 3.28 CM
AV INDEX (PROSTH): 0.99
AV MEAN GRADIENT: 4 MMHG
AV VALVE AREA: 3.44 CM2
BSA FOR ECHO PROCEDURE: 1.46 M2
CV ECHO LV RWT: 0.81 CM
DOP CALC AO VTI: 25.23 CM
DOP CALC LVOT AREA: 3.5 CM2
DOP CALC LVOT DIAMETER: 2.1 CM
DOP CALC LVOT PEAK VEL: 114.89 M/S
DOP CALC LVOT STROKE VOLUME: 86.86 CM3
DOP CALCLVOT PEAK VEL VTI: 25.09 CM
E WAVE DECELERATION TIME: 140.89 MSEC
E/A RATIO: 2.43
E/E' RATIO: 5.09 M/S
ECHO LV POSTERIOR WALL: 1.37 CM (ref 0.6–1.1)
EJECTION FRACTION: 60 %
FRACTIONAL SHORTENING: 32 % (ref 28–44)
INTERVENTRICULAR SEPTUM: 1.37 CM (ref 0.6–1.1)
LEFT ATRIUM SIZE: 3.52 CM
LEFT INTERNAL DIMENSION IN SYSTOLE: 2.29 CM (ref 2.1–4)
LEFT VENTRICLE DIASTOLIC VOLUME INDEX: 25.33 ML/M2
LEFT VENTRICLE DIASTOLIC VOLUME: 38.25 ML
LEFT VENTRICLE MASS INDEX: 105 G/M2
LEFT VENTRICLE SYSTOLIC VOLUME INDEX: 8 ML/M2
LEFT VENTRICLE SYSTOLIC VOLUME: 12.08 ML
LEFT VENTRICULAR INTERNAL DIMENSION IN DIASTOLE: 3.37 CM (ref 3.5–6)
LEFT VENTRICULAR MASS: 158.54 G
LV LATERAL E/E' RATIO: 6.22 M/S
LV SEPTAL E/E' RATIO: 4.31 M/S
MV PEAK A VEL: 0.23 M/S
MV PEAK E VEL: 0.56 M/S
PISA MRMAX VEL: 438.37 M/S
PISA TR MAX VEL: 3.78 M/S
RA PRESSURE: 3 MMHG
RIGHT VENTRICULAR END-DIASTOLIC DIMENSION: 2.3 CM
TDI LATERAL: 0.09 M/S
TDI SEPTAL: 0.13 M/S
TDI: 0.11 M/S
TR MAX PG: 57 MMHG
TRICUSPID ANNULAR PLANE SYSTOLIC EXCURSION: 1.93 CM
TV REST PULMONARY ARTERY PRESSURE: 60 MMHG

## 2022-09-08 PROCEDURE — 93306 ECHO (CUPID ONLY): ICD-10-PCS | Mod: 26,,, | Performed by: INTERNAL MEDICINE

## 2022-09-08 PROCEDURE — 93306 TTE W/DOPPLER COMPLETE: CPT

## 2022-09-08 PROCEDURE — 93306 TTE W/DOPPLER COMPLETE: CPT | Mod: 26,,, | Performed by: INTERNAL MEDICINE

## 2022-09-08 NOTE — TELEPHONE ENCOUNTER
----- Message from Barrett Jurado MD sent at 9/8/2022  3:09 PM CDT -----  Have NO idea!    Dr. Jurado    ----- Message -----  From: Loretta Camejo LPN  Sent: 9/8/2022   2:27 PM CDT  To: Barrett Jurado MD    Do you know what this is for?  ----- Message -----  From: Mile Richardson  Sent: 9/8/2022  12:30 PM CDT  To: Adrien Hyde Staff ([place])    .Type:  Patient Call Back    Who Called: Pritesh [facility]       Does the patient know what this is regarding?: EKG ORDERS PLEASE ADD THEM AND REACH OUT TO PT     Would the patient rather a call back YES     Best Call Back Number: [phone]    Additional Information: Thank You

## 2022-09-08 NOTE — TELEPHONE ENCOUNTER
Spoke with son and explained that Ekgs are  done in the office and never referred out. Dr. Jurado has no knowledge of needing to order one.

## 2022-09-09 ENCOUNTER — PATIENT MESSAGE (OUTPATIENT)
Dept: OPHTHALMOLOGY | Facility: CLINIC | Age: 77
End: 2022-09-09
Payer: MEDICARE

## 2022-09-14 ENCOUNTER — PATIENT MESSAGE (OUTPATIENT)
Dept: FAMILY MEDICINE | Facility: CLINIC | Age: 77
End: 2022-09-14
Payer: MEDICARE

## 2022-09-14 DIAGNOSIS — R63.4 ABNORMAL WEIGHT LOSS: Primary | ICD-10-CM

## 2022-09-14 NOTE — TELEPHONE ENCOUNTER
The thyroid was last checked just under a year ago.  It was normal at that time but it has been enough time for things to change.  Orders are in for a repeat

## 2022-09-15 ENCOUNTER — PATIENT MESSAGE (OUTPATIENT)
Dept: FAMILY MEDICINE | Facility: CLINIC | Age: 77
End: 2022-09-15
Payer: MEDICARE

## 2022-09-20 ENCOUNTER — LAB VISIT (OUTPATIENT)
Dept: LAB | Facility: HOSPITAL | Age: 77
End: 2022-09-20
Attending: FAMILY MEDICINE
Payer: MEDICARE

## 2022-09-20 DIAGNOSIS — R63.4 ABNORMAL WEIGHT LOSS: ICD-10-CM

## 2022-09-20 LAB — TSH SERPL DL<=0.005 MIU/L-ACNC: 1.19 UIU/ML (ref 0.4–4)

## 2022-09-20 PROCEDURE — 84443 ASSAY THYROID STIM HORMONE: CPT | Performed by: FAMILY MEDICINE

## 2022-09-20 PROCEDURE — 36415 COLL VENOUS BLD VENIPUNCTURE: CPT | Mod: PO | Performed by: FAMILY MEDICINE

## 2022-09-23 ENCOUNTER — EXTERNAL HOME HEALTH (OUTPATIENT)
Dept: HOME HEALTH SERVICES | Facility: HOSPITAL | Age: 77
End: 2022-09-23
Payer: MEDICARE

## 2022-09-26 PROCEDURE — G0179 PR HOME HEALTH MD RECERTIFICATION: ICD-10-PCS | Mod: ,,, | Performed by: FAMILY MEDICINE

## 2022-09-26 PROCEDURE — G0179 MD RECERTIFICATION HHA PT: HCPCS | Mod: ,,, | Performed by: FAMILY MEDICINE

## 2022-10-09 ENCOUNTER — CLINICAL SUPPORT (OUTPATIENT)
Dept: CARDIOLOGY | Facility: HOSPITAL | Age: 77
End: 2022-10-09
Payer: MEDICARE

## 2022-10-09 DIAGNOSIS — Z95.0 PRESENCE OF CARDIAC PACEMAKER: ICD-10-CM

## 2022-10-09 PROCEDURE — 93296 REM INTERROG EVL PM/IDS: CPT | Performed by: INTERNAL MEDICINE

## 2022-10-10 ENCOUNTER — PATIENT MESSAGE (OUTPATIENT)
Dept: CARDIOLOGY | Facility: CLINIC | Age: 77
End: 2022-10-10
Payer: MEDICARE

## 2022-10-10 DIAGNOSIS — I63.032 CEREBRAL INFARCTION DUE TO THROMBOSIS OF LEFT CAROTID ARTERY: ICD-10-CM

## 2022-10-10 DIAGNOSIS — E78.5 HYPERLIPIDEMIA: ICD-10-CM

## 2022-10-10 RX ORDER — ROSUVASTATIN CALCIUM 40 MG/1
40 TABLET, COATED ORAL NIGHTLY
Qty: 90 TABLET | Refills: 3 | Status: SHIPPED | OUTPATIENT
Start: 2022-10-10 | End: 2023-03-22 | Stop reason: ALTCHOICE

## 2022-10-11 ENCOUNTER — TELEPHONE (OUTPATIENT)
Dept: PSYCHIATRY | Facility: CLINIC | Age: 77
End: 2022-10-11
Payer: MEDICARE

## 2022-10-11 NOTE — TELEPHONE ENCOUNTER
----- Message from An Bruner MA sent at 10/11/2022 10:15 AM CDT -----  Regarding: Schedule new pt appt.  Patient  Jan called to Schedule new patient appt.  [phone].

## 2022-10-26 ENCOUNTER — OFFICE VISIT (OUTPATIENT)
Dept: PSYCHIATRY | Facility: CLINIC | Age: 77
End: 2022-10-26
Attending: FAMILY MEDICINE
Payer: MEDICARE

## 2022-10-26 VITALS
BODY MASS INDEX: 15.4 KG/M2 | WEIGHT: 98.13 LBS | HEIGHT: 67 IN | DIASTOLIC BLOOD PRESSURE: 71 MMHG | HEART RATE: 67 BPM | SYSTOLIC BLOOD PRESSURE: 116 MMHG

## 2022-10-26 DIAGNOSIS — F50.9 EATING DISORDER, UNSPECIFIED TYPE: ICD-10-CM

## 2022-10-26 DIAGNOSIS — F41.0 PANIC DISORDER: ICD-10-CM

## 2022-10-26 DIAGNOSIS — F33.1 MAJOR DEPRESSIVE DISORDER, RECURRENT, MODERATE: Primary | ICD-10-CM

## 2022-10-26 DIAGNOSIS — F41.1 GENERALIZED ANXIETY DISORDER: ICD-10-CM

## 2022-10-26 DIAGNOSIS — F40.298 SPECIFIC PHOBIA: ICD-10-CM

## 2022-10-26 PROCEDURE — 1157F PR ADVANCE CARE PLAN OR EQUIV PRESENT IN MEDICAL RECORD: ICD-10-PCS | Mod: CPTII,S$GLB,, | Performed by: PSYCHOLOGIST

## 2022-10-26 PROCEDURE — 3078F DIAST BP <80 MM HG: CPT | Mod: CPTII,S$GLB,, | Performed by: PSYCHOLOGIST

## 2022-10-26 PROCEDURE — 99999 PR PBB SHADOW E&M-EST. PATIENT-LVL IV: ICD-10-PCS | Mod: PBBFAC,,, | Performed by: PSYCHOLOGIST

## 2022-10-26 PROCEDURE — 1126F AMNT PAIN NOTED NONE PRSNT: CPT | Mod: CPTII,S$GLB,, | Performed by: PSYCHOLOGIST

## 2022-10-26 PROCEDURE — 1126F PR PAIN SEVERITY QUANTIFIED, NO PAIN PRESENT: ICD-10-PCS | Mod: CPTII,S$GLB,, | Performed by: PSYCHOLOGIST

## 2022-10-26 PROCEDURE — 3078F PR MOST RECENT DIASTOLIC BLOOD PRESSURE < 80 MM HG: ICD-10-PCS | Mod: CPTII,S$GLB,, | Performed by: PSYCHOLOGIST

## 2022-10-26 PROCEDURE — 1157F ADVNC CARE PLAN IN RCRD: CPT | Mod: CPTII,S$GLB,, | Performed by: PSYCHOLOGIST

## 2022-10-26 PROCEDURE — 90792 PSYCH DIAG EVAL W/MED SRVCS: CPT | Mod: S$GLB,,, | Performed by: PSYCHOLOGIST

## 2022-10-26 PROCEDURE — 1101F PT FALLS ASSESS-DOCD LE1/YR: CPT | Mod: CPTII,S$GLB,, | Performed by: PSYCHOLOGIST

## 2022-10-26 PROCEDURE — 90792 PR PSYCHIATRIC DIAGNOSTIC EVALUATION W/MEDICAL SERVICES: ICD-10-PCS | Mod: S$GLB,,, | Performed by: PSYCHOLOGIST

## 2022-10-26 PROCEDURE — 3074F PR MOST RECENT SYSTOLIC BLOOD PRESSURE < 130 MM HG: ICD-10-PCS | Mod: CPTII,S$GLB,, | Performed by: PSYCHOLOGIST

## 2022-10-26 PROCEDURE — 3074F SYST BP LT 130 MM HG: CPT | Mod: CPTII,S$GLB,, | Performed by: PSYCHOLOGIST

## 2022-10-26 PROCEDURE — 99999 PR PBB SHADOW E&M-EST. PATIENT-LVL IV: CPT | Mod: PBBFAC,,, | Performed by: PSYCHOLOGIST

## 2022-10-26 PROCEDURE — 3288F PR FALLS RISK ASSESSMENT DOCUMENTED: ICD-10-PCS | Mod: CPTII,S$GLB,, | Performed by: PSYCHOLOGIST

## 2022-10-26 PROCEDURE — 1159F PR MEDICATION LIST DOCUMENTED IN MEDICAL RECORD: ICD-10-PCS | Mod: CPTII,S$GLB,, | Performed by: PSYCHOLOGIST

## 2022-10-26 PROCEDURE — 1101F PR PT FALLS ASSESS DOC 0-1 FALLS W/OUT INJ PAST YR: ICD-10-PCS | Mod: CPTII,S$GLB,, | Performed by: PSYCHOLOGIST

## 2022-10-26 PROCEDURE — 1159F MED LIST DOCD IN RCRD: CPT | Mod: CPTII,S$GLB,, | Performed by: PSYCHOLOGIST

## 2022-10-26 PROCEDURE — 3288F FALL RISK ASSESSMENT DOCD: CPT | Mod: CPTII,S$GLB,, | Performed by: PSYCHOLOGIST

## 2022-10-26 RX ORDER — ARIPIPRAZOLE 20 MG/1
20 TABLET ORAL DAILY
COMMUNITY
Start: 2022-08-19 | End: 2022-12-07 | Stop reason: SDUPTHER
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
## 2022-10-27 ENCOUNTER — OFFICE VISIT (OUTPATIENT)
Dept: DERMATOLOGY | Facility: CLINIC | Age: 77
End: 2022-10-27
Payer: MEDICARE

## 2022-10-27 VITALS — BODY MASS INDEX: 15.38 KG/M2 | HEIGHT: 67 IN | WEIGHT: 98 LBS

## 2022-10-27 DIAGNOSIS — Z85.828 HISTORY OF NONMELANOMA SKIN CANCER: ICD-10-CM

## 2022-10-27 DIAGNOSIS — L57.0 ACTINIC KERATOSES: ICD-10-CM

## 2022-10-27 DIAGNOSIS — D18.01 CHERRY ANGIOMA: ICD-10-CM

## 2022-10-27 DIAGNOSIS — L82.1 SEBORRHEIC KERATOSES: ICD-10-CM

## 2022-10-27 DIAGNOSIS — L90.5 SCAR: ICD-10-CM

## 2022-10-27 DIAGNOSIS — D48.5 NEOPLASM OF UNCERTAIN BEHAVIOR OF SKIN: Primary | ICD-10-CM

## 2022-10-27 PROCEDURE — 1160F RVW MEDS BY RX/DR IN RCRD: CPT | Mod: CPTII,S$GLB,, | Performed by: DERMATOLOGY

## 2022-10-27 PROCEDURE — 17003 DESTRUCTION, PREMALIGNANT LESIONS; SECOND THROUGH 14 LESIONS: ICD-10-PCS | Mod: 59,S$GLB,, | Performed by: DERMATOLOGY

## 2022-10-27 PROCEDURE — 17000 DESTRUCT PREMALG LESION: CPT | Mod: 59,S$GLB,, | Performed by: DERMATOLOGY

## 2022-10-27 PROCEDURE — 1159F MED LIST DOCD IN RCRD: CPT | Mod: CPTII,S$GLB,, | Performed by: DERMATOLOGY

## 2022-10-27 PROCEDURE — 88305 TISSUE EXAM BY PATHOLOGIST: CPT | Mod: 26,,, | Performed by: DERMATOLOGY

## 2022-10-27 PROCEDURE — 88305 TISSUE EXAM BY PATHOLOGIST: ICD-10-PCS | Mod: 26,,, | Performed by: DERMATOLOGY

## 2022-10-27 PROCEDURE — 3288F FALL RISK ASSESSMENT DOCD: CPT | Mod: CPTII,S$GLB,, | Performed by: DERMATOLOGY

## 2022-10-27 PROCEDURE — 1101F PR PT FALLS ASSESS DOC 0-1 FALLS W/OUT INJ PAST YR: ICD-10-PCS | Mod: CPTII,S$GLB,, | Performed by: DERMATOLOGY

## 2022-10-27 PROCEDURE — 3288F PR FALLS RISK ASSESSMENT DOCUMENTED: ICD-10-PCS | Mod: CPTII,S$GLB,, | Performed by: DERMATOLOGY

## 2022-10-27 PROCEDURE — 1126F PR PAIN SEVERITY QUANTIFIED, NO PAIN PRESENT: ICD-10-PCS | Mod: CPTII,S$GLB,, | Performed by: DERMATOLOGY

## 2022-10-27 PROCEDURE — 1160F PR REVIEW ALL MEDS BY PRESCRIBER/CLIN PHARMACIST DOCUMENTED: ICD-10-PCS | Mod: CPTII,S$GLB,, | Performed by: DERMATOLOGY

## 2022-10-27 PROCEDURE — 11102 TANGNTL BX SKIN SINGLE LES: CPT | Mod: S$GLB,,, | Performed by: DERMATOLOGY

## 2022-10-27 PROCEDURE — 17000 PR DESTRUCTION(LASER SURGERY,CRYOSURGERY,CHEMOSURGERY),PREMALIGNANT LESIONS,FIRST LESION: ICD-10-PCS | Mod: 59,S$GLB,, | Performed by: DERMATOLOGY

## 2022-10-27 PROCEDURE — 1101F PT FALLS ASSESS-DOCD LE1/YR: CPT | Mod: CPTII,S$GLB,, | Performed by: DERMATOLOGY

## 2022-10-27 PROCEDURE — 99203 OFFICE O/P NEW LOW 30 MIN: CPT | Mod: 25,S$GLB,, | Performed by: DERMATOLOGY

## 2022-10-27 PROCEDURE — 99999 PR PBB SHADOW E&M-EST. PATIENT-LVL IV: ICD-10-PCS | Mod: PBBFAC,,, | Performed by: DERMATOLOGY

## 2022-10-27 PROCEDURE — 99999 PR PBB SHADOW E&M-EST. PATIENT-LVL IV: CPT | Mod: PBBFAC,,, | Performed by: DERMATOLOGY

## 2022-10-27 PROCEDURE — 99203 PR OFFICE/OUTPT VISIT, NEW, LEVL III, 30-44 MIN: ICD-10-PCS | Mod: 25,S$GLB,, | Performed by: DERMATOLOGY

## 2022-10-27 PROCEDURE — 1159F PR MEDICATION LIST DOCUMENTED IN MEDICAL RECORD: ICD-10-PCS | Mod: CPTII,S$GLB,, | Performed by: DERMATOLOGY

## 2022-10-27 PROCEDURE — 11102 PR TANGENTIAL BIOPSY, SKIN, SINGLE LESION: ICD-10-PCS | Mod: S$GLB,,, | Performed by: DERMATOLOGY

## 2022-10-27 PROCEDURE — 11103 TANGNTL BX SKIN EA SEP/ADDL: CPT | Mod: S$GLB,,, | Performed by: DERMATOLOGY

## 2022-10-27 PROCEDURE — 17003 DESTRUCT PREMALG LES 2-14: CPT | Mod: 59,S$GLB,, | Performed by: DERMATOLOGY

## 2022-10-27 PROCEDURE — 88305 TISSUE EXAM BY PATHOLOGIST: CPT | Mod: 59 | Performed by: DERMATOLOGY

## 2022-10-27 PROCEDURE — 1157F ADVNC CARE PLAN IN RCRD: CPT | Mod: CPTII,S$GLB,, | Performed by: DERMATOLOGY

## 2022-10-27 PROCEDURE — 1157F PR ADVANCE CARE PLAN OR EQUIV PRESENT IN MEDICAL RECORD: ICD-10-PCS | Mod: CPTII,S$GLB,, | Performed by: DERMATOLOGY

## 2022-10-27 PROCEDURE — 1126F AMNT PAIN NOTED NONE PRSNT: CPT | Mod: CPTII,S$GLB,, | Performed by: DERMATOLOGY

## 2022-10-27 PROCEDURE — 11103 PR TANGENTIAL BIOPSY, SKIN, EA ADDTL LESION: ICD-10-PCS | Mod: S$GLB,,, | Performed by: DERMATOLOGY

## 2022-10-27 NOTE — PROGRESS NOTES
Subjective:       Patient ID:  Ayla Dela Cruz is a 77 y.o. female who presents for   Chief Complaint   Patient presents with    Skin Check     TBSC    Spot     Top lip     New Patient    Patient here today for TBSC  C/O spot to upper lip x 6 months. Scabs but doesn't bleed  Used OTC antibiotic creams- no resolution    Lots of outdoor activities in the past, played tennis    Derm HX:  Patient states she had a hx of skin cancer to right temple, unsure of type-  2016   Denies Fhx of MM    Current Outpatient Medications:   ·  amitriptyline (ELAVIL) 25 MG tablet, Take 25 mg by mouth nightly., Disp: , Rfl:   ·  apixaban (ELIQUIS) 5 mg Tab, Take 1 tablet (5 mg total) by mouth 2 (two) times daily., Disp: 60 tablet, Rfl: 11  ·  ARIPiprazole (ABILIFY) 20 MG Tab, Take 20 mg by mouth once daily., Disp: , Rfl:   ·  aspirin 81 MG Chew, TAKE 1 TABLET BY MOUTH EVERY DAY, Disp: 90 tablet, Rfl: 4  ·  ATIVAN 0.5 mg tablet, Take 0.5 mg by mouth 2 (two) times daily as needed for Anxiety. , Disp: , Rfl:   ·  atropine 1% (ISOPTO ATROPINE) 1 % Drop, Place 1 drop into the right eye once daily., Disp: , Rfl:   ·  busPIRone (BUSPAR) 10 MG tablet, Take 10 mg by mouth 3 (three) times daily., Disp: , Rfl:   ·  dorzolamide-timolol 2-0.5% (COSOPT) 22.3-6.8 mg/mL ophthalmic solution, Place into both eyes., Disp: , Rfl:   ·  metoprolol tartrate (LOPRESSOR) 25 MG tablet, Take 1 tablet (25 mg total) by mouth 2 (two) times daily., Disp: 180 tablet, Rfl: 2  ·  pantoprazole (PROTONIX) 40 MG tablet, Take 1 tablet (40 mg total) by mouth once daily., Disp: 90 tablet, Rfl: 3  ·  rosuvastatin (CRESTOR) 40 MG Tab, Take 1 tablet (40 mg total) by mouth every evening., Disp: 90 tablet, Rfl: 3  ·  TRINTELLIX 10 mg Tab, Take 1 tablet by mouth nightly., Disp: , Rfl:         Review of Systems   Constitutional:  Negative for fever, chills and fatigue.   Skin:  Positive for dry skin. Negative for daily sunscreen use and activity-related sunscreen use.    Hematologic/Lymphatic: Bruises/bleeds easily.      Objective:    Physical Exam   Constitutional: She appears well-developed and well-nourished.   Neurological: She is alert and oriented to person, place, and time.   Psychiatric: She has a normal mood and affect.   Skin:                        Diagram Legend     Erythematous scaling macule/papule c/w actinic keratosis       Vascular papule c/w angioma      Pigmented verrucoid papule/plaque c/w seborrheic keratosis      Yellow umbilicated papule c/w sebaceous hyperplasia      Irregularly shaped tan macule c/w lentigo     1-2 mm smooth white papules consistent with Milia      Movable subcutaneous cyst with punctum c/w epidermal inclusion cyst      Subcutaneous movable cyst c/w pilar cyst      Firm pink to brown papule c/w dermatofibroma      Pedunculated fleshy papule(s) c/w skin tag(s)      Evenly pigmented macule c/w junctional nevus     Mildly variegated pigmented, slightly irregular-bordered macule c/w mildly atypical nevus      Flesh colored to evenly pigmented papule c/w intradermal nevus       Pink pearly papule/plaque c/w basal cell carcinoma      Erythematous hyperkeratotic cursted plaque c/w SCC      Surgical scar with no sign of skin cancer recurrence      Open and closed comedones      Inflammatory papules and pustules      Verrucoid papule consistent consistent with wart     Erythematous eczematous patches and plaques     Dystrophic onycholytic nail with subungual debris c/w onychomycosis     Umbilicated papule    Erythematous-base heme-crusted tan verrucoid plaque consistent with inflamed seborrheic keratosis     Erythematous Silvery Scaling Plaque c/w Psoriasis     See annotation            Assessment / Plan:      Pathology Orders:       Normal Orders This Visit    Specimen to Pathology, Dermatology     Comments:    Number of Specimens:->2  ------------------------->-------------------------  Spec 1 Procedure:->Biopsy  Spec 1 Clinical Impression:->BCC  vs other  Spec 1 Source:->left upper cut lip  ------------------------->-------------------------  Spec 2 Procedure:->Biopsy  Spec 2 Clinical Impression:->BCC vs other  Spec 2 Source:->left upper back    Questions:    Procedure Type: Dermatology and skin neoplasms    Number of Specimens: 2    ------------------------: -------------------------    Spec 1 Procedure: Biopsy    Spec 1 Clinical Impression: BCC vs other    Spec 1 Source: left upper cut lip    ------------------------: -------------------------    Spec 2 Procedure: Biopsy    Spec 2 Clinical Impression: BCC vs other    Spec 2 Source: left upper back    Release to patient:           Neoplasm of uncertain behavior of skin  -     Specimen to Pathology, Dermatology  Shave biopsy procedure note:x2    Shave biopsy performed after verbal consent including risk of infection, scar, recurrence, need for additional treatment of site. Area prepped with alcohol, anesthetized with approximately 1.0cc of 1% lidocaine with epinephrine. Lesional tissue shaved with razor blade. Hemostasis achieved with application of aluminum chloride - no hyfrecation (patient with PACEMAKER and or DEFIBRILLATOR). No complications. Dressing applied. Wound care explained.    Actinic keratoses  Cryosurgery Procedure Note    Verbal consent from the patient is obtained and the patient is aware of the precancerous quality and need for treatment of these lesions. Liquid nitrogen cryosurgery is applied to the 2 actinic keratoses, as detailed in the physical exam, to produce a freeze injury. The patient is aware that blisters may form and is instructed on wound care with gentle cleansing and use of vaseline ointment to keep moist until healed. The patient is supplied a handout on cryosurgery and is instructed to call if lesions do not completely resolve.    Seborrheic keratoses  These are benign inherited growths without a malignant potential. Reassurance given to patient. No treatment is necessary.      Cherry angioma  This is a benign vascular lesion. Reassurance given. No treatment required.     History of nonmelanoma skin cancer  Scar  Area of previous BCC examined. Site well healed with no signs of recurrence.    Total body skin examination performed today including at least 12 points as noted in physical examination. 2 lesions suspicious for malignancy noted.             Follow up in about 6 months (around 4/27/2023).

## 2022-10-27 NOTE — PATIENT INSTRUCTIONS
Shave Biopsy Wound Care    Your doctor has performed a shave biopsy today.  A band aid and vaseline ointment has been placed over the site.  This should remain in place for 24 hours.  It is recommended that you keep the area dry for the first 24 hours.  After 24 hours, you may remove the band aid and wash the area with warm soap and water and apply Vaseline jelly.  Many patients prefer to use Neosporin or Bacitracin ointment.  This is acceptable; however, know that you can develop an allergy to this medication even if you have used it safely for years.  It is important to keep the area moist.  Letting it dry out and get air slows healing time, and will worsen the scar.  Band aid is optional after first 24 hours.      If you notice increasing redness, tenderness, pain, or yellow drainage at the biopsy site, please notify your doctor.  These are signs of an infection.    If your biopsy site is bleeding, apply firm pressure for 15 minutes straight.  Repeat for another 15 minutes, if it is still bleeding.   If the surgical site continues to bleed, then please contact your doctor.       [facility] DERMATOLOGY  [address] [place] [address], [address] [id number]  Dept: [phone]      CRYOSURGERY      Your doctor has used a method called cryosurgery to treat your skin condition. Cryosurgery refers to the use of very cold substances to treat a variety of skin conditions such as warts, pre-skin cancers, molluscum contagiosum, sun spots, and several benign growths. The substance we use in cryosurgery is liquid nitrogen and is so cold (-195 degrees Celsius) that is burns when administered.     Following treatment in the office, the skin may immediately burn and become red. You may find the area around the lesion is affected as well. It is sometimes necessary to treat not only the lesion, but a small area of the surrounding normal skin to achieve a good response.     A blister, and even a blood  filled blister, may form after treatment.   This is a normal response. If the blister is painful, it is acceptable to sterilize a needle and with rubbing alcohol and gently pop the blister. It is important that you gently wash the area with soap and warm water as the blister fluid may contain wart virus if a wart was treated. Do no remove the roof of the blister.     The area treated can take anywhere from 1-3 weeks to heal. Healing time depends on the kind skin lesion treated, the location, and how aggressively the lesion was treated. It is recommended that the areas treated are covered with Vaseline or bacitracin ointment and a band-aid. If a band-aid is not practical, just ointment applied several times per day will do. Keeping these areas moist will speed the healing time.    Treatment with liquid nitrogen can leave a scar. In dark skin, it may be a light or dark scar, in light skin it may be a white or pink scar. These will generally fade with time, but may never go away completely.     If you have any concerns after your treatment, please feel free to call the office.         [facility] - DERMATOLOGY  [address] [place] [id number]  Dept: [phone]

## 2022-11-01 LAB
FINAL PATHOLOGIC DIAGNOSIS: NORMAL
GROSS: NORMAL
Lab: NORMAL
MICROSCOPIC EXAM: NORMAL

## 2022-11-02 ENCOUNTER — TELEPHONE (OUTPATIENT)
Dept: DERMATOLOGY | Facility: CLINIC | Age: 77
End: 2022-11-02
Payer: MEDICARE

## 2022-11-02 NOTE — TELEPHONE ENCOUNTER
Contacted pt in regards to bx results. Spoke to  and pt, voices understanding and understands the need for MOHS to lip lesion. She's aware to monitor back lesion for any new concerns. Referral sent to .

## 2022-11-02 NOTE — TELEPHONE ENCOUNTER
----- Message from Allie Jarrell MD sent at 11/1/2022  4:46 PM CDT -----  Both lesions are skin cancer  Back lesion appears fully removed by biopsy, ok to monitor  Lip lesion should be reexcised, ideally by Mohs surgeon. Please discuss with patient and .    1.  Skin, left upper cutaneous lip, shave biopsy:   -BASAL CELL CARCINOMA, NODULAR AND INFILTRATIVE, EXTENDING TO THE DEEP AND A   PERIPHERAL MARGIN   This lesion is skin cancer. You will be contacted regarding treatment.   2. Skin, left upper back, shave biopsy:   -BASAL CELL CARCINOMA,NODULAR AND INFILTRATIVE, EXCISED IN THE PLANES OF   SECTIONS EXAMINED

## 2022-11-10 ENCOUNTER — OFFICE VISIT (OUTPATIENT)
Dept: OPTOMETRY | Facility: CLINIC | Age: 77
End: 2022-11-10
Payer: MEDICARE

## 2022-11-10 DIAGNOSIS — H40.1134 PRIMARY OPEN ANGLE GLAUCOMA (POAG) OF BOTH EYES, INDETERMINATE STAGE: ICD-10-CM

## 2022-11-10 DIAGNOSIS — H52.7 REFRACTIVE ERROR: ICD-10-CM

## 2022-11-10 DIAGNOSIS — H17.9 CORNEAL OPACITY OF RIGHT EYE: ICD-10-CM

## 2022-11-10 DIAGNOSIS — Z96.1 PSEUDOPHAKIA: ICD-10-CM

## 2022-11-10 DIAGNOSIS — Z01.00 EXAMINATION OF EYES AND VISION: Primary | ICD-10-CM

## 2022-11-10 PROCEDURE — 1126F AMNT PAIN NOTED NONE PRSNT: CPT | Mod: CPTII,S$GLB,, | Performed by: OPTOMETRIST

## 2022-11-10 PROCEDURE — 92015 PR REFRACTION: ICD-10-PCS | Mod: S$GLB,,, | Performed by: OPTOMETRIST

## 2022-11-10 PROCEDURE — 1159F PR MEDICATION LIST DOCUMENTED IN MEDICAL RECORD: ICD-10-PCS | Mod: CPTII,S$GLB,, | Performed by: OPTOMETRIST

## 2022-11-10 PROCEDURE — 1160F RVW MEDS BY RX/DR IN RCRD: CPT | Mod: CPTII,S$GLB,, | Performed by: OPTOMETRIST

## 2022-11-10 PROCEDURE — 92004 PR EYE EXAM, NEW PATIENT,COMPREHESV: ICD-10-PCS | Mod: S$GLB,,, | Performed by: OPTOMETRIST

## 2022-11-10 PROCEDURE — 99999 PR PBB SHADOW E&M-EST. PATIENT-LVL III: ICD-10-PCS | Mod: PBBFAC,,, | Performed by: OPTOMETRIST

## 2022-11-10 PROCEDURE — 1157F ADVNC CARE PLAN IN RCRD: CPT | Mod: CPTII,S$GLB,, | Performed by: OPTOMETRIST

## 2022-11-10 PROCEDURE — 1160F PR REVIEW ALL MEDS BY PRESCRIBER/CLIN PHARMACIST DOCUMENTED: ICD-10-PCS | Mod: CPTII,S$GLB,, | Performed by: OPTOMETRIST

## 2022-11-10 PROCEDURE — 99999 PR PBB SHADOW E&M-EST. PATIENT-LVL III: CPT | Mod: PBBFAC,,, | Performed by: OPTOMETRIST

## 2022-11-10 PROCEDURE — 1101F PT FALLS ASSESS-DOCD LE1/YR: CPT | Mod: CPTII,S$GLB,, | Performed by: OPTOMETRIST

## 2022-11-10 PROCEDURE — 1159F MED LIST DOCD IN RCRD: CPT | Mod: CPTII,S$GLB,, | Performed by: OPTOMETRIST

## 2022-11-10 PROCEDURE — 1101F PR PT FALLS ASSESS DOC 0-1 FALLS W/OUT INJ PAST YR: ICD-10-PCS | Mod: CPTII,S$GLB,, | Performed by: OPTOMETRIST

## 2022-11-10 PROCEDURE — 92015 DETERMINE REFRACTIVE STATE: CPT | Mod: S$GLB,,, | Performed by: OPTOMETRIST

## 2022-11-10 PROCEDURE — 1126F PR PAIN SEVERITY QUANTIFIED, NO PAIN PRESENT: ICD-10-PCS | Mod: CPTII,S$GLB,, | Performed by: OPTOMETRIST

## 2022-11-10 PROCEDURE — 1157F PR ADVANCE CARE PLAN OR EQUIV PRESENT IN MEDICAL RECORD: ICD-10-PCS | Mod: CPTII,S$GLB,, | Performed by: OPTOMETRIST

## 2022-11-10 PROCEDURE — 3288F FALL RISK ASSESSMENT DOCD: CPT | Mod: CPTII,S$GLB,, | Performed by: OPTOMETRIST

## 2022-11-10 PROCEDURE — 3288F PR FALLS RISK ASSESSMENT DOCUMENTED: ICD-10-PCS | Mod: CPTII,S$GLB,, | Performed by: OPTOMETRIST

## 2022-11-10 PROCEDURE — 92004 COMPRE OPH EXAM NEW PT 1/>: CPT | Mod: S$GLB,,, | Performed by: OPTOMETRIST

## 2022-11-10 NOTE — PROGRESS NOTES
HPI    Pt here today for refraction only.   States blurred vision at distance OD   only ---- blurs in & out.    (+) Cosopt OU bid  Last edited by Roxy Caldera on 11/10/2022  2:00 PM.            Assessment /Plan     For exam results, see Encounter Report.    Examination of eyes and vision    Pseudophakia    Refractive error    Primary open angle glaucoma (POAG) of both eyes, indeterminate stage    Corneal opacity of right eye      1. Examination of eyes and vision    2. Pseudophakia  S/p cataract extraction OU    3. Refractive error  Dispensed updated spectacle Rx. Discussed various spectacle lens options. Discussed adaptation period to new specs.   Demonstrated new spec Rx vs current specs in phoropter with patient satisfaction    4. Primary open angle glaucoma (POAG) of both eyes, indeterminate stage  Continue drops and f/u as directed by Dr. Tan    5. Corneal opacity of right eye  Hx of hyphema, continue f/u as directed by Dr. Tan

## 2022-11-14 ENCOUNTER — TELEPHONE (OUTPATIENT)
Dept: FAMILY MEDICINE | Facility: CLINIC | Age: 77
End: 2022-11-14
Payer: MEDICARE

## 2022-11-14 NOTE — TELEPHONE ENCOUNTER
Patient's  (Jan) reports patient received flu shot and shingles shot. Call placed to [facility]. Confirmed patient received flu shot on 11-11-22 and Zoster vaccination on 8-19-22 and 11-11-22. Medical record updated to reflect this information.

## 2022-11-17 ENCOUNTER — PATIENT MESSAGE (OUTPATIENT)
Dept: OPTOMETRY | Facility: CLINIC | Age: 77
End: 2022-11-17
Payer: MEDICARE

## 2022-11-17 ENCOUNTER — OFFICE VISIT (OUTPATIENT)
Dept: DERMATOLOGY | Facility: CLINIC | Age: 77
End: 2022-11-17
Payer: MEDICARE

## 2022-11-17 ENCOUNTER — PATIENT MESSAGE (OUTPATIENT)
Dept: ELECTROPHYSIOLOGY | Facility: CLINIC | Age: 77
End: 2022-11-17
Payer: MEDICARE

## 2022-11-17 VITALS
WEIGHT: 102 LBS | HEART RATE: 89 BPM | DIASTOLIC BLOOD PRESSURE: 70 MMHG | BODY MASS INDEX: 16.01 KG/M2 | SYSTOLIC BLOOD PRESSURE: 139 MMHG | HEIGHT: 67 IN

## 2022-11-17 DIAGNOSIS — C44.01 BASAL CELL CARCINOMA, LIP: Primary | ICD-10-CM

## 2022-11-17 PROCEDURE — 1126F PR PAIN SEVERITY QUANTIFIED, NO PAIN PRESENT: ICD-10-PCS | Mod: CPTII,S$GLB,, | Performed by: DERMATOLOGY

## 2022-11-17 PROCEDURE — 1160F RVW MEDS BY RX/DR IN RCRD: CPT | Mod: CPTII,S$GLB,, | Performed by: DERMATOLOGY

## 2022-11-17 PROCEDURE — 1126F AMNT PAIN NOTED NONE PRSNT: CPT | Mod: CPTII,S$GLB,, | Performed by: DERMATOLOGY

## 2022-11-17 PROCEDURE — 99213 PR OFFICE/OUTPT VISIT, EST, LEVL III, 20-29 MIN: ICD-10-PCS | Mod: S$GLB,,, | Performed by: DERMATOLOGY

## 2022-11-17 PROCEDURE — 1159F PR MEDICATION LIST DOCUMENTED IN MEDICAL RECORD: ICD-10-PCS | Mod: CPTII,S$GLB,, | Performed by: DERMATOLOGY

## 2022-11-17 PROCEDURE — 1101F PT FALLS ASSESS-DOCD LE1/YR: CPT | Mod: CPTII,S$GLB,, | Performed by: DERMATOLOGY

## 2022-11-17 PROCEDURE — 3075F SYST BP GE 130 - 139MM HG: CPT | Mod: CPTII,S$GLB,, | Performed by: DERMATOLOGY

## 2022-11-17 PROCEDURE — 3078F PR MOST RECENT DIASTOLIC BLOOD PRESSURE < 80 MM HG: ICD-10-PCS | Mod: CPTII,S$GLB,, | Performed by: DERMATOLOGY

## 2022-11-17 PROCEDURE — 1159F MED LIST DOCD IN RCRD: CPT | Mod: CPTII,S$GLB,, | Performed by: DERMATOLOGY

## 2022-11-17 PROCEDURE — 1157F ADVNC CARE PLAN IN RCRD: CPT | Mod: CPTII,S$GLB,, | Performed by: DERMATOLOGY

## 2022-11-17 PROCEDURE — 3078F DIAST BP <80 MM HG: CPT | Mod: CPTII,S$GLB,, | Performed by: DERMATOLOGY

## 2022-11-17 PROCEDURE — 1101F PR PT FALLS ASSESS DOC 0-1 FALLS W/OUT INJ PAST YR: ICD-10-PCS | Mod: CPTII,S$GLB,, | Performed by: DERMATOLOGY

## 2022-11-17 PROCEDURE — 3075F PR MOST RECENT SYSTOLIC BLOOD PRESS GE 130-139MM HG: ICD-10-PCS | Mod: CPTII,S$GLB,, | Performed by: DERMATOLOGY

## 2022-11-17 PROCEDURE — 1160F PR REVIEW ALL MEDS BY PRESCRIBER/CLIN PHARMACIST DOCUMENTED: ICD-10-PCS | Mod: CPTII,S$GLB,, | Performed by: DERMATOLOGY

## 2022-11-17 PROCEDURE — 99213 OFFICE O/P EST LOW 20 MIN: CPT | Mod: S$GLB,,, | Performed by: DERMATOLOGY

## 2022-11-17 PROCEDURE — 3288F PR FALLS RISK ASSESSMENT DOCUMENTED: ICD-10-PCS | Mod: CPTII,S$GLB,, | Performed by: DERMATOLOGY

## 2022-11-17 PROCEDURE — 1157F PR ADVANCE CARE PLAN OR EQUIV PRESENT IN MEDICAL RECORD: ICD-10-PCS | Mod: CPTII,S$GLB,, | Performed by: DERMATOLOGY

## 2022-11-17 PROCEDURE — 99999 PR PBB SHADOW E&M-EST. PATIENT-LVL IV: ICD-10-PCS | Mod: PBBFAC,,, | Performed by: DERMATOLOGY

## 2022-11-17 PROCEDURE — 99999 PR PBB SHADOW E&M-EST. PATIENT-LVL IV: CPT | Mod: PBBFAC,,, | Performed by: DERMATOLOGY

## 2022-11-17 PROCEDURE — 3288F FALL RISK ASSESSMENT DOCD: CPT | Mod: CPTII,S$GLB,, | Performed by: DERMATOLOGY

## 2022-11-17 NOTE — PROGRESS NOTES
On ASPIRIN On ELIQUIS   ALLERGIES:  Gabapentin, Xarelto [rivaroxaban], Bactrim [sulfamethoxazole-trimethoprim], Afrin (pseudoephedrine), Amoxicillin-pot clavulanate, Atorvastatin, Baclofen, Baclofen (bulk), Ciprofloxacin, Ciprofloxacin (bulk), Decongest tabs, Decongestant [pseudoephedrine hcl], Erythromycin, Flecainide, Fluoxetine, Lisinopril, Losartan, Morphine, Tramadol, Venlafaxine, Venlafaxine analogues, Afrin [oxymetazoline], Caffeine, Dabigatran etexilate, and Tizanidine    CHIEF COMPLAINT:  This 77 y.o. female comes for evaluation for Mohs' Micrographic Surgery, Fresh Tissue Technique, for treatment of a biopsy-proven basal cell carcinoma on the Allie Jarrell M.D.. Consultation requested by Allie Jarrell M.D..    The patient is accompanied to this visit by her .    HISTORY OF PRESENT ILLNESS:   Location: Left upper lip   Duration: 1 months or more  Context: status post biopsy by YOSI Dominguez path = as below; pathology accession # [id number]-, [facility]    Prior Treatment: none    Defibrillator: No  Pacemaker: Yes  Artificial heart valves: No  Artificial joints: No       Final Pathologic Diagnosis   Date Value Ref Range Status   10/27/2022   Final    1.  Skin, left upper cutaneous lip, shave biopsy:  -BASAL CELL CARCINOMA, NODULAR AND INFILTRATIVE, EXTENDING TO THE DEEP AND A  PERIPHERAL MARGIN  This lesion is skin cancer. You will be contacted regarding treatment.  2. Skin, left upper back, shave biopsy:  -BASAL CELL CARCINOMA,NODULAR AND INFILTRATIVE, EXCISED IN THE PLANES OF  SECTIONS EXAMINED  This lesion is skin cancer. You will be contacted regarding treatment.       Comment:     Interp By Angie Howell M.D., Signed on 11/01/2022 at 15:17         REVIEW OF SYSTEMS:   General: general health good  Skin: previous skin cancer(s) yes. Right temple. Unsure ot type.    If yes, details:   Relevant other:  No   Cardiovascular:   High Blood Pressure: Yes   Chest Pain:  No    Defibrillator: as above  Pacemaker: as above  Artificial heart valves: as above  Prior Endocarditis: No   Prior Heart Attack/MI: No     If yes, when:   Prior Cardiac Bypass or Stents:  No   If yes, when:   Mitral Valve Prolapse: No   Relevant other:  A-fib/ CHF  Respiratory:   Shortness of breath: Yes; has asthma   Relevant other:  No   Endocrine:   Diabetes:  No   Relevant other:  No   Hem/Lymph:   Taking Prescribed Blood Thinners: + YES +  Eliquis/ 81 mg aspirin   Easy Bleeding:  Yes  Relevant other:  No   Allergy/Immuno: as noted above  Relevant other:  No   GI:   Prior Hepatitis:  No     If yes, details:   Relevant other:  No   Musculoskeletal:   Artificial joints: as above  Relevant other:  No   Neurologic:   Prior Stroke:  No     If yes, details:   Relevant other:  No   Relevant other info:  No     PAST MEDICAL HISTORY:  Past Medical History:   Diagnosis Date    Anticoagulant long-term use     Anxiety     Arthritis     Atrial fibrillation     Cancer     skin    CHF (congestive heart failure)     Depression     DVT (deep venous thrombosis)     GERD (gastroesophageal reflux disease)     Glaucoma (increased eye pressure)     Hyperlipidemia     diet controlled    Hypertension     Interstitial lung disease     Localized hives 01/10/2020    Mild persistent asthma without complication 11/12/2018    Pacemaker     Pneumonia 01/31/2014    Stroke 06/03/2014    Stroke     TIA (transient ischemic attack)     TIA (transient ischemic attack)        PAST SURGICAL HISTORY:    Past Surgical History:   Procedure Laterality Date    2 heart ablations      3 in total    A-V CARDIAC PACEMAKER INSERTION Left 10/14/2021    Procedure: INSERTION, CARDIAC PACEMAKER, DUAL CHAMBER;  Surgeon: Fco Calderon MD;  Location: [facility] EP LAB;  Service: Cardiology;  Laterality: Left;  PAF/ [place] Arrthymias, Dual PPM, SJM, MAC, GP, 3 PREP    ANTERIOR VITRECTOMY Right 7/27/2022    Procedure: VITRECTOMY, ANTERIOR APPROACH;  Surgeon: Daniel Tan,  MD;  Location: [facility];  Service: Ophthalmology;  Laterality: Right;    bilateral cataracts      CHOLECYSTECTOMY      COLONOSCOPY N/A 1/19/2017    Procedure: COLONOSCOPY and EGD;  Surgeon: Jonathan Schultz MD;  Location: [facility] ENDO;  Service: Endoscopy;  Laterality: N/A;    COLONOSCOPY N/A 10/10/2019    Procedure: COLONOSCOPY;  Surgeon: Alex Christian MD;  Location: [facility] ENDO;  Service: Endoscopy;  Laterality: N/A;    ESOPHAGOGASTRODUODENOSCOPY N/A 10/14/2019    Procedure: EGD (ESOPHAGOGASTRODUODENOSCOPY);  Surgeon: Alex Christian MD;  Location: [facility] ENDO;  Service: Endoscopy;  Laterality: N/A;    INJECTION OF ANESTHETIC AGENT AROUND MEDIAL BRANCH NERVES INNERVATING CERVICAL FACET JOINT Right 6/7/2018    Procedure: BLOCK, NERVE, FACET JOINT, MEDIAL BRANCH, CERVICAL;  Surgeon: Galo Clancy MD;  Location: [facility];  Service: Pain Management;  Laterality: Right;  C4, 5, 6    OPEN REDUCTION AND INTERNAL FIXATION (ORIF) OF INJURY OF ANKLE Right 11/1/2018    Procedure: ORIF, ANKLE;  Surgeon: Wilfredo Aguero MD;  Location: [facility];  Service: Orthopedics;  Laterality: Right;    PARACENTESIS, EYE, ANTERIOR CHAMBER, WITH AQUEOUS REMOVAL Right 7/27/2022    Procedure: Anterior chamber washout, possible IOL removal;  Surgeon: Daniel Tan MD;  Location: [facility];  Service: Ophthalmology;  Laterality: Right;    pyloristenosis      RADIOFREQUENCY ABLATION OF LUMBAR MEDIAL BRANCH NERVE AT SINGLE LEVEL Bilateral 9/21/2018    Procedure: RADIOFREQUENCY ABLATION, NERVE, SPINAL, LUMBAR, MEDIAL BRANCH, 1 LEVEL;  Surgeon: Galo Clancy MD;  Location: [facility];  Service: Pain Management;  Laterality: Bilateral;  L3, 4, 5    RADIOFREQUENCY ABLATION OF LUMBAR MEDIAL BRANCH NERVE AT SINGLE LEVEL Bilateral 2/19/2019    Procedure: Radiofrequency Ablation, Nerve, Spinal, Lumbar, Medial Branch, 1 Level;  Surgeon: Galo Clancy MD;  Location: [facility];  Service: Pain Management;  Laterality: Bilateral;  L3, 4, 5     RADIOFREQUENCY ABLATION OF  LUMBAR MEDIAL BRANCH NERVE AT SINGLE LEVEL Bilateral 3/10/2020    Procedure: Radiofrequency Ablation, Nerve, Spinal, Lumbar, Medial Branch, 1 Level;  Surgeon: Galo Clancy MD;  Location: [facility] OR;  Service: Pain Management;  Laterality: Bilateral;  L3,4,5 - Burned at 80 degrees C. for 60 seconds x 2 each site      RADIOFREQUENCY ABLATION OF LUMBAR MEDIAL BRANCH NERVE AT SINGLE LEVEL Bilateral 9/11/2020    Procedure: Radiofrequency Ablation, Nerve, Spinal, Lumbar, Medial Branch, 1 Level;  Surgeon: Galo Clancy MD;  Location: [facility] OR;  Service: Pain Management;  Laterality: Bilateral;  L3, 4, 5 - Burned at 80 degrees C. for 60 seconds x 2 each site    RADIOFREQUENCY ABLATION OF LUMBAR MEDIAL BRANCH NERVE AT SINGLE LEVEL Bilateral 5/28/2021    Procedure: Radiofrequency Ablation, Nerve, Spinal, Lumbar, Medial Branch, 1 Level;  Surgeon: Galo Clancy MD;  Location: [facility] OR;  Service: Pain Management;  Laterality: Bilateral;  L3,4,5    RADIOFREQUENCY THERMAL COAGULATION OF MEDIAL BRANCH OF POSTERIOR RAMUS OF CERVICAL SPINAL NERVE Right 7/3/2018    Procedure: RADIOFREQUENCY THERMAL COAGULATION, NERVE, SPINAL, CERVICAL, MEDIAL BRANCH OF POSTERIOR RAMUS;  Surgeon: Galo Clancy MD;  Location: [facility] OR;  Service: Pain Management;  Laterality: Right;  C4,5,6 - Burned at 80 degrees C. for 75 seconds each site    RADIOFREQUENCY THERMAL COAGULATION OF MEDIAL BRANCH OF POSTERIOR RAMUS OF CERVICAL SPINAL NERVE Right 7/23/2019    Procedure: RADIOFREQUENCY THERMAL COAGULATION, NERVE, SPINAL, CERVICAL, POSTERIOR RAMUS, MEDIAL BRANCH;  Surgeon: Galo Clancy MD;  Location: [facility] OR;  Service: Pain Management;  Laterality: Right;  C4,5,6    RADIOFREQUENCY THERMAL COAGULATION OF MEDIAL BRANCH OF POSTERIOR RAMUS OF CERVICAL SPINAL NERVE Right 6/23/2020    Procedure: RADIOFREQUENCY THERMAL COAGULATION, NERVE, SPINAL, CERVICAL, POSTERIOR RAMUS, MEDIAL BRANCH;  Surgeon: Galo Clancy MD;  Location: [facility] OR;  Service: Pain Management;  Laterality: Right;   C4, 5, 6    RADIOFREQUENCY THERMOCOAGULATION Bilateral 9/10/2019    Procedure: RADIOFREQUENCY THERMAL COAGULATION LUMBAR;  Surgeon: Galo Clancy MD;  Location: [facility] OR;  Service: Pain Management;  Laterality: Bilateral;  L3,4,5 - Burned at 80 degrees C. for 60 seconds x 2 each site    skin cancer removal       TONSILLECTOMY          SOCIAL HISTORY:  Dependencies: smoking status as noted below  Social History     Tobacco Use    Smoking status: Never    Smokeless tobacco: Never   Substance Use Topics    Alcohol use: No    Drug use: No       PERTINENT MEDICATIONS:  See medications list.    Current Outpatient Medications:     amitriptyline (ELAVIL) 25 MG tablet, Take 25 mg by mouth nightly., Disp: , Rfl:     apixaban (ELIQUIS) 5 mg Tab, Take 1 tablet (5 mg total) by mouth 2 (two) times daily., Disp: 60 tablet, Rfl: 11    ARIPiprazole (ABILIFY) 20 MG Tab, Take 20 mg by mouth once daily., Disp: , Rfl:     aspirin 81 MG Chew, TAKE 1 TABLET BY MOUTH EVERY DAY, Disp: 90 tablet, Rfl: 4    ATIVAN 0.5 mg tablet, Take 0.5 mg by mouth 2 (two) times daily as needed for Anxiety. , Disp: , Rfl:     atropine 1% (ISOPTO ATROPINE) 1 % Drop, Place 1 drop into the right eye once daily., Disp: , Rfl:     busPIRone (BUSPAR) 10 MG tablet, Take 10 mg by mouth 3 (three) times daily., Disp: , Rfl:     dorzolamide-timolol 2-0.5% (COSOPT) 22.3-6.8 mg/mL ophthalmic solution, Place into both eyes., Disp: , Rfl:     metoprolol tartrate (LOPRESSOR) 25 MG tablet, Take 1 tablet (25 mg total) by mouth 2 (two) times daily., Disp: 180 tablet, Rfl: 2    pantoprazole (PROTONIX) 40 MG tablet, Take 1 tablet (40 mg total) by mouth once daily., Disp: 90 tablet, Rfl: 3    rosuvastatin (CRESTOR) 40 MG Tab, Take 1 tablet (40 mg total) by mouth every evening., Disp: 90 tablet, Rfl: 3    TRINTELLIX 10 mg Tab, Take 1 tablet by mouth nightly., Disp: , Rfl:   No current facility-administered medications for this visit.    Facility-Administered Medications Ordered in  Other Visits:     bupivacaine (PF) 0.25% (2.5 mg/ml) injection, , , PRN, Galo Clancy MD, 6 mL at 02/19/19 1314    lidocaine (PF) 10 mg/ml (1%) injection, , , PRN, Galo Clancy MD, 9 mL at 02/19/19 1304    lidocaine (PF) 20 mg/ml (2%) injection, , , PRN, Galo Clancy MD, 6 mL at 02/19/19 1310    methylPREDNISolone acetate injection, , , PRN, Galo Clancy MD, 80 mg at 02/19/19 1314    ALLERGIES:  Gabapentin, Xarelto [rivaroxaban], Bactrim [sulfamethoxazole-trimethoprim], Afrin (pseudoephedrine), Amoxicillin-pot clavulanate, Atorvastatin, Baclofen, Baclofen (bulk), Ciprofloxacin, Ciprofloxacin (bulk), Decongest tabs, Decongestant [pseudoephedrine hcl], Erythromycin, Flecainide, Fluoxetine, Lisinopril, Losartan, Morphine, Tramadol, Venlafaxine, Venlafaxine analogues, Afrin [oxymetazoline], Caffeine, Dabigatran etexilate, and Tizanidine    EXAM:  Constitutional  General appearance: well-developed, well-nourished, well-kempt older white female    Neurologic/Psychiatric  Alert,  normal orientation to time, place, person  Normal mood and affect with no evidence of depression, anxiety, agitation  Skin: see photo(s)  Head: background moderate solar damage to exposed areas of skin  inspection/palpation reveals an approximately 6 mm depressed pink scar on the left medial upper lip at the vermilion   she confirmed this as the site of the prior biopsy and site(s) confirmed by reference to the photograph(s) attached below taken at the time of the biopsy/biopsies by the referring physician    Photo(s) this visit:       Photo(s) from biopsy visit:      ASSESSMENT: biopsy-proven nodular/infiltrative basal cell carcinoma of the left upper lip  chronic solar damage to areas as noted above  personal history of non-melanoma skin cancer  Long term current use of anticoagulant    PLAN:  The diagnosis and management options, and risks and benefits of the alternatives, including observation/non-treatment, radiation treatment, excision with  vertical frozen section or paraffin-embedded section margin evaluation, and Mohs' Micrographic Surgery, Fresh Tissue Technique, were discussed at length with the patient and her . In particular, the discussion included, but was not limited to, the following:    One alternative at this point would be to defer further treatment and observe the lesion. With small skin cancers of this kind, it is possible that a biopsy can be sufficient to definitively treat a small skin cancer of this kind. Alternatively, some skin cancers are slow growing and do not require immediate treatment. The potential advantage of this choice would be to avoid the need for possibly unnecessary additional surgery. Among the potential disadvantages of this would be the possibility of enlargement of the lesion, more extensive spread of the lesion or recurrence at a later date, which might necessitate a larger and more complex surgery.    Radiation treatment can be an effective treatment for this type of skin cancer. The usual course of treatment is every weekday for several weeks. Local irritation will result from treatment, although no systemic side effects are expected. The potential advantage of radiation treatment is that it avoids the need for surgery. Among the disadvantages of radiation treatment are the length of treatment, the local inflammatory response, the absence of pathologic confirmation of the removal of the skin cancer, a possible increased risk of additional skin cancer in the treated area in later years, and a somewhat increased risk of recurrence at a later date.     Excisional surgery can be an effective treatment for this type of skin cancer. This would involve excision of the lesion with margin evaluation by submitting the specimen to a pathologist for either immediate marginal assessment via frozen section processing, or delayed marginal assessment by fixed-tissue processing. The potential advantage of this technique  is that it offers a way of treating the lesion with some degree of histologic confirmation of tumor removal. Among the disadvantages of this treatment are the possible need for re-excision if marginal involvement is identified, a somewhat greater likelihood of recurrence as compared to Mohs' surgery because of the less comprehensive margin evaluation inherent in the technique, and the general potential risks of surgery, including allergic reactions to the anesthetic and other materials used, infection, injury to nerves in the area with consequent loss of sensation or muscle function, and scarring or distortion of surrounding structures.    Mohs' surgery is a very effective treatment for this type of skin cancer. The potential advantage of Mohs' surgery is that this technique offers the greatest possible certainty of knowing that the skin cancer has been completely removed, with the removal of the least amount of normal tissue. The potential disadvantages of Mohs' surgery include the duration of the surgery, the possible need for a separate surgery for reconstruction following tumor removal, and scarring as a result. In addition, general potential risks of surgery as noted above also apply to treatment via Mohs' surgery.    In light of the infiltrative nature of this tumor and the location on the lipin an area of increased risk of recurrence,  Mohs' micrographic surgery was thought to be the most appropriate management choice, and this diagnosis is appropriate for treatment by Mohs' micrographic surgery.     We also discussed options for management of the wound following completion of tumor removal via the Mohs' technique. This discussion include a review of the nature of, and risks and benefits of the alternatives, including healing via secondary intention, primary linear closure, closure via adjacent tissue transfer/skin flap(s), and closure by use of a skin graft.     Sufficient time was available for questions,  and all questions were answered to her satisfaction. She fully understands the aims, risks, alternatives, and possible complications, and has elected to proceed with the surgery, and verbally consented to do so. The procedure will be scheduled in the near future.    Routine pre-op instructions were given to her.  --------------------------------------  Note: Some or all of this note may have been generated using voice recognition software. There may be voice recognition errors including grammatical and/or spelling errors found in the text. Attempts were made to correct these errors prior to signature.

## 2022-11-18 RX ORDER — ATROPINE SULFATE 10 MG/ML
1 SOLUTION/ DROPS OPHTHALMIC 2 TIMES DAILY
Qty: 5 ML | Refills: 6 | Status: SHIPPED | OUTPATIENT
Start: 2022-11-18 | End: 2023-10-27

## 2022-12-07 ENCOUNTER — TELEPHONE (OUTPATIENT)
Dept: PSYCHIATRY | Facility: CLINIC | Age: 77
End: 2022-12-07
Payer: MEDICARE

## 2022-12-07 ENCOUNTER — OFFICE VISIT (OUTPATIENT)
Dept: PSYCHIATRY | Facility: CLINIC | Age: 77
End: 2022-12-07
Payer: MEDICARE

## 2022-12-07 VITALS
WEIGHT: 98.56 LBS | SYSTOLIC BLOOD PRESSURE: 123 MMHG | HEIGHT: 67 IN | DIASTOLIC BLOOD PRESSURE: 68 MMHG | HEART RATE: 62 BPM | BODY MASS INDEX: 15.47 KG/M2

## 2022-12-07 DIAGNOSIS — F41.0 PANIC DISORDER: ICD-10-CM

## 2022-12-07 DIAGNOSIS — F40.298 SPECIFIC PHOBIA: ICD-10-CM

## 2022-12-07 DIAGNOSIS — F33.1 MAJOR DEPRESSIVE DISORDER, RECURRENT, MODERATE: Primary | ICD-10-CM

## 2022-12-07 DIAGNOSIS — F41.1 GENERALIZED ANXIETY DISORDER: ICD-10-CM

## 2022-12-07 PROCEDURE — 3288F PR FALLS RISK ASSESSMENT DOCUMENTED: ICD-10-PCS | Mod: CPTII,S$GLB,, | Performed by: PSYCHOLOGIST

## 2022-12-07 PROCEDURE — 3074F PR MOST RECENT SYSTOLIC BLOOD PRESSURE < 130 MM HG: ICD-10-PCS | Mod: CPTII,S$GLB,, | Performed by: PSYCHOLOGIST

## 2022-12-07 PROCEDURE — 1101F PR PT FALLS ASSESS DOC 0-1 FALLS W/OUT INJ PAST YR: ICD-10-PCS | Mod: CPTII,S$GLB,, | Performed by: PSYCHOLOGIST

## 2022-12-07 PROCEDURE — 3078F PR MOST RECENT DIASTOLIC BLOOD PRESSURE < 80 MM HG: ICD-10-PCS | Mod: CPTII,S$GLB,, | Performed by: PSYCHOLOGIST

## 2022-12-07 PROCEDURE — 1157F PR ADVANCE CARE PLAN OR EQUIV PRESENT IN MEDICAL RECORD: ICD-10-PCS | Mod: CPTII,S$GLB,, | Performed by: PSYCHOLOGIST

## 2022-12-07 PROCEDURE — 90833 PR PSYCHOTHERAPY W/PATIENT W/E&M, 30 MIN (ADD ON): ICD-10-PCS | Mod: S$GLB,,, | Performed by: PSYCHOLOGIST

## 2022-12-07 PROCEDURE — 99214 PR OFFICE/OUTPT VISIT, EST, LEVL IV, 30-39 MIN: ICD-10-PCS | Mod: S$GLB,,, | Performed by: PSYCHOLOGIST

## 2022-12-07 PROCEDURE — 99999 PR PBB SHADOW E&M-EST. PATIENT-LVL III: CPT | Mod: PBBFAC,,, | Performed by: PSYCHOLOGIST

## 2022-12-07 PROCEDURE — 1126F PR PAIN SEVERITY QUANTIFIED, NO PAIN PRESENT: ICD-10-PCS | Mod: CPTII,S$GLB,, | Performed by: PSYCHOLOGIST

## 2022-12-07 PROCEDURE — 3078F DIAST BP <80 MM HG: CPT | Mod: CPTII,S$GLB,, | Performed by: PSYCHOLOGIST

## 2022-12-07 PROCEDURE — 90833 PSYTX W PT W E/M 30 MIN: CPT | Mod: S$GLB,,, | Performed by: PSYCHOLOGIST

## 2022-12-07 PROCEDURE — 3074F SYST BP LT 130 MM HG: CPT | Mod: CPTII,S$GLB,, | Performed by: PSYCHOLOGIST

## 2022-12-07 PROCEDURE — 1157F ADVNC CARE PLAN IN RCRD: CPT | Mod: CPTII,S$GLB,, | Performed by: PSYCHOLOGIST

## 2022-12-07 PROCEDURE — 99214 OFFICE O/P EST MOD 30 MIN: CPT | Mod: S$GLB,,, | Performed by: PSYCHOLOGIST

## 2022-12-07 PROCEDURE — 1126F AMNT PAIN NOTED NONE PRSNT: CPT | Mod: CPTII,S$GLB,, | Performed by: PSYCHOLOGIST

## 2022-12-07 PROCEDURE — 99999 PR PBB SHADOW E&M-EST. PATIENT-LVL III: ICD-10-PCS | Mod: PBBFAC,,, | Performed by: PSYCHOLOGIST

## 2022-12-07 PROCEDURE — 3288F FALL RISK ASSESSMENT DOCD: CPT | Mod: CPTII,S$GLB,, | Performed by: PSYCHOLOGIST

## 2022-12-07 PROCEDURE — 1101F PT FALLS ASSESS-DOCD LE1/YR: CPT | Mod: CPTII,S$GLB,, | Performed by: PSYCHOLOGIST

## 2022-12-07 RX ORDER — VORTIOXETINE 10 MG/1
1 TABLET, FILM COATED ORAL NIGHTLY
Qty: 30 TABLET | Refills: 1 | Status: SHIPPED | OUTPATIENT
Start: 2022-12-07 | End: 2023-01-11 | Stop reason: SDUPTHER

## 2022-12-07 RX ORDER — ARIPIPRAZOLE 5 MG/1
TABLET ORAL
Qty: 45 TABLET | Refills: 0 | Status: SHIPPED | OUTPATIENT
Start: 2022-12-07 | End: 2022-12-08

## 2022-12-07 NOTE — TELEPHONE ENCOUNTER
Called to discuss Genes[employer] with patient. We can try to do a prior authorization if the patient requests it. Many times it does  require a peer to peer conference between the provider and the insurance. There is no guarantee the insurance will cover, and if the insurance does not cover the Genesight test the patient will be responsible for the charges incurred. (Usually it is in the $300+ range)

## 2022-12-07 NOTE — PROGRESS NOTES
Outpatient Psychiatry Follow-Up Visit    Clinical Status of Patient: Outpatient (Ambulatory)  12/07/2022     Chief Complaint:  presenting today for a follow-up.       Interval History and Content of Current Session:  Interim Events/Subjective Report/Content of Current Session: 78 y/o female follow-up appointment.    Pt is a 78 y/o female with past psychiatric hx of depression, anxiety, eating disorder who presents for follow-up treatment. Pt reported some increased energy and motivation. Has been able to help around the house a little more than previously. Continues to wake with some anxiety but gets engaged in activities to cope and has been able to reduce ativan use. She continues to lose weight and is working with therapist ([facility]).     Past Psychiatric hx: remeron, rexulti, vraylar, Limbitrol, venlafaxine, fluoxetine, gabapentin, cannot remember others.     Past Medical hx:   Past Medical History:   Diagnosis Date    Anticoagulant long-term use     Anxiety     Arthritis     Atrial fibrillation     Cancer     skin    CHF (congestive heart failure)     Depression     DVT (deep venous thrombosis)     GERD (gastroesophageal reflux disease)     Glaucoma (increased eye pressure)     Hyperlipidemia     diet controlled    Hypertension     Interstitial lung disease     Localized hives 01/10/2020    Mild persistent asthma without complication 11/12/2018    Pacemaker     Pneumonia 01/31/2014    Stroke 06/03/2014    Stroke     TIA (transient ischemic attack)     TIA (transient ischemic attack)         Interim hx:  Medication changes last visit: None  Anxiety: decrease  Depression: decrease     Denies suicidal/homicidal ideations.  Denies hopelessness/worthlessness.    Denies auditory/visual hallucinations      Alcohol: Infrequent use  Drug: Pt denied  Caffeine: Not assessed  Tobacco: Pt denied      Review of Systems   PSYCHIATRIC: Pertinent items are noted in the narrative.        CONSTITUTIONAL: weight  stable    Past Medical, Family and Social History: The patient's past medical, family and social history have been reviewed and updated as appropriate within the electronic medical record. See encounter notes.     Current Psychiatric Medication:  aripiprazole 20 mg, ativan 0.5 mg BID, buspirone 10 mg TID, trintillex 10 mg, amitriptyline 37.5 mg (ingredient in lumitrol) (recently increased)     Compliance: yes      Side effects: Pt denied     Risk Parameters:  Patient reports no suicidal ideation  Patient reports no homicidal ideation  Patient reports no self-injurious behavior  Patient reports no violent behavior     Exam (detailed: at least 9 elements; comprehensive: all 15 elements)   Constitutional  Vitals:  Most recent vital signs, dated less than 90 days prior to this appointment, were reviewed.        General:  unremarkable, age appropriate, casual attire, good eye contact, good rapport       Musculoskeletal  Muscle Strength/Tone:  no flaccidity, no tremor    Gait & Station:  normal      Psychiatric                       Speech:  normal tone, normal rate, rhythm, and volume   Mood & Affect:   Depressed, anxious         Thought Process:   Goal directed; Linear    Associations:   intact   Thought Content:   No SI/HI, delusions, or paranoia, no AV/VH   Insight & Judgement:   Good, adequate to circumstances   Orientation:   grossly intact; alert and oriented x 4    Memory:  intact for content of interview    Language:  grossly intact, can repeat    Attention Span  : Grossly intact for content of interview   Fund of Knowledge:   intact and appropriate to age and level of education        Assessment and Diagnosis   Status/Progress: Based on the examination today, the patient's problem(s) is/are adequately controlled.  New problems have not been presented today. Co-morbidities are not complicating management of the primary condition. There are no active rule-out diagnoses for this patient at this time.       Impression: Pt appears to be a little more responsive and engaged. Continues to lose weight and hesitant to eat. Given psychoeducation on the importance of increased caloric intake and discussed solutions to reduce anxiety and increase calories. Will titrate and D/C aripiprazole and consider low dose quetiapine or mirtazapine in the future.     Diagnosis:   1) Major Depressive Disorder, recurrent, moderate  2) Generalized Anxiety Disorder  3) Specific Phobia  4) Panic Disorder   5) Personality Disorder traits  6) R/o Dementia of unknown etiology  Intervention/Counseling/Treatment Plan   Medication Management:      1. Titrate and D/C aripiprazole 20 mg    2. ativan 0.5 mg BID (infrequent use, not taken in a week or two)    3. buspirone 10 mg TID    4. trintillex 10 mg    5. Amitriptyline 37.5 mg     6. Call to report any worsening of symptoms or problems with the medication. Pt instructed to go to ER with thoughts of harming self, others     7. Patient given contact # for psychotherapists at [facility] and also instructed to check with insurance for list of providers.     Psychotherapy: 20 minutes   Target symptoms: anxiety, weight loss  Why chosen therapy is appropriate versus another modality: CBT used; relevant to diagnosis, patient responds to this modality  Outcome monitoring methods: self-report, observation  Therapeutic intervention type: Cognitive Behavioral Therapy  Topics discussed/themes: building skills sets for symptom management, symptom recognition, nutrition, exercise  The patient's response to the intervention is accepting  Patient's response to treatment is: good.   The patient's progress toward treatment goals: limited     Return to clinic: 1 month    -Cognitive-Behavioral/Supportive therapy and psychoeducation provided  -R/B/SE's of medications discussed with the pt who expresses understanding and chooses to take medications as prescribed.   -Pt instructed to call clinic, 911 or go to  nearest emergency room if sxs worsen or pt is in   crisis. The pt expresses understanding.    Galo Lipscomb, PhD, MP     Antidepressant/Antianxiety Medication Initiation:  Patient informed of risks, benefits, and potential side effects of medication and accepts informed consent.  Common side effects include nausea, fatigue, headache, insomnia., Specifically discussed the possibility of new or worsening suicidal thoughts/depression.  Patient instructed to stop the medication immediately and seek urgent treatment if this occurs. Patient instructed not to abruptly discontinue medication without physician guidance except in cases of sudden onset or worsening of SI.       Stimulant Medication Initiation:  Patient advised of risks, benefits, and side effects of medication and accepts informed consent.  Common side effects include insomnia, irritability, jittery feeling, dry mouth, and agitation/hostility., Patient advised of potential addictive nature of medication and controlled substance classification.  Instructed to safeguard medication as no early refills can be given for lost or stolen medications.       Benzodiazepine Initiation:  Patient advised of the risks, benefits, and common side effects of medication and has accepted informed consent.  Common side effects include drowsiness, impaired coordination, possible memory loss., Patient advised NOT to operate a vehicle or machinery untiil they are sure how the medication will affec them.  Client also advised of danger of mixing this medication with alcohol., Patient advised of potential addictive nature of medication and need to safeguard medication as no early refills for lost or stolen medications can be authorized.       Pregnancy Warning:  Patient denies current pregnancy possibility.  Patient made aware that medications have not been proven safe in pregnancy and that she must maintain adequate birth control.  Patient instructed to alert us immediately if she  becomes pregnant.       Sleep Aid Initiation:  Patient advised of potential side effects of medication including sleep walking or other complex behaviors.  Patient advised not to mix medication with alcohol, to go to bed immediately after taking, and to stop at first sign of any unusual behaviors.      Antipsychotic Initiation: Typical JEAN's reviewed including weight gain, abnormal movements, EPS, TD, metabolic side effects

## 2022-12-08 ENCOUNTER — CLINICAL SUPPORT (OUTPATIENT)
Dept: PSYCHIATRY | Facility: CLINIC | Age: 77
End: 2022-12-08
Payer: MEDICARE

## 2022-12-08 DIAGNOSIS — F33.1 MAJOR DEPRESSIVE DISORDER, RECURRENT EPISODE, MODERATE: Primary | ICD-10-CM

## 2022-12-08 RX ORDER — BUSPIRONE HYDROCHLORIDE 10 MG/1
10 TABLET ORAL 3 TIMES DAILY
Qty: 90 TABLET | Refills: 1 | Status: SHIPPED | OUTPATIENT
Start: 2022-12-08 | End: 2023-01-11 | Stop reason: SDUPTHER

## 2022-12-08 RX ORDER — ARIPIPRAZOLE 10 MG/1
10 TABLET ORAL DAILY
Qty: 14 TABLET | Refills: 0 | Status: SHIPPED | OUTPATIENT
Start: 2022-12-08 | End: 2023-01-11

## 2022-12-08 RX ORDER — ARIPIPRAZOLE 5 MG/1
5 TABLET ORAL DAILY
Qty: 14 TABLET | Refills: 0 | Status: SHIPPED | OUTPATIENT
Start: 2022-12-08 | End: 2023-01-11

## 2022-12-09 ENCOUNTER — PATIENT MESSAGE (OUTPATIENT)
Dept: PSYCHIATRY | Facility: CLINIC | Age: 77
End: 2022-12-09
Payer: MEDICARE

## 2022-12-09 ENCOUNTER — OFFICE VISIT (OUTPATIENT)
Dept: PULMONOLOGY | Facility: CLINIC | Age: 77
End: 2022-12-09
Payer: MEDICARE

## 2022-12-09 VITALS
HEART RATE: 73 BPM | BODY MASS INDEX: 15.47 KG/M2 | OXYGEN SATURATION: 99 % | SYSTOLIC BLOOD PRESSURE: 116 MMHG | HEIGHT: 67 IN | WEIGHT: 98.56 LBS | DIASTOLIC BLOOD PRESSURE: 80 MMHG

## 2022-12-09 DIAGNOSIS — I27.20 PULMONARY HYPERTENSION: Primary | ICD-10-CM

## 2022-12-09 DIAGNOSIS — J98.4 RESTRICTIVE LUNG DISEASE: ICD-10-CM

## 2022-12-09 PROBLEM — J45.30 MILD PERSISTENT ASTHMA WITHOUT COMPLICATION: Status: RESOLVED | Noted: 2018-11-12 | Resolved: 2022-12-09

## 2022-12-09 PROCEDURE — 1157F PR ADVANCE CARE PLAN OR EQUIV PRESENT IN MEDICAL RECORD: ICD-10-PCS | Mod: CPTII,S$GLB,, | Performed by: NURSE PRACTITIONER

## 2022-12-09 PROCEDURE — 1126F AMNT PAIN NOTED NONE PRSNT: CPT | Mod: CPTII,S$GLB,, | Performed by: NURSE PRACTITIONER

## 2022-12-09 PROCEDURE — 1157F ADVNC CARE PLAN IN RCRD: CPT | Mod: CPTII,S$GLB,, | Performed by: NURSE PRACTITIONER

## 2022-12-09 PROCEDURE — 3079F DIAST BP 80-89 MM HG: CPT | Mod: CPTII,S$GLB,, | Performed by: NURSE PRACTITIONER

## 2022-12-09 PROCEDURE — 1159F PR MEDICATION LIST DOCUMENTED IN MEDICAL RECORD: ICD-10-PCS | Mod: CPTII,S$GLB,, | Performed by: NURSE PRACTITIONER

## 2022-12-09 PROCEDURE — 99213 OFFICE O/P EST LOW 20 MIN: CPT | Mod: S$GLB,,, | Performed by: NURSE PRACTITIONER

## 2022-12-09 PROCEDURE — 99999 PR PBB SHADOW E&M-EST. PATIENT-LVL IV: CPT | Mod: PBBFAC,,, | Performed by: NURSE PRACTITIONER

## 2022-12-09 PROCEDURE — 3288F FALL RISK ASSESSMENT DOCD: CPT | Mod: CPTII,S$GLB,, | Performed by: NURSE PRACTITIONER

## 2022-12-09 PROCEDURE — 3074F PR MOST RECENT SYSTOLIC BLOOD PRESSURE < 130 MM HG: ICD-10-PCS | Mod: CPTII,S$GLB,, | Performed by: NURSE PRACTITIONER

## 2022-12-09 PROCEDURE — 3079F PR MOST RECENT DIASTOLIC BLOOD PRESSURE 80-89 MM HG: ICD-10-PCS | Mod: CPTII,S$GLB,, | Performed by: NURSE PRACTITIONER

## 2022-12-09 PROCEDURE — 1159F MED LIST DOCD IN RCRD: CPT | Mod: CPTII,S$GLB,, | Performed by: NURSE PRACTITIONER

## 2022-12-09 PROCEDURE — 1101F PR PT FALLS ASSESS DOC 0-1 FALLS W/OUT INJ PAST YR: ICD-10-PCS | Mod: CPTII,S$GLB,, | Performed by: NURSE PRACTITIONER

## 2022-12-09 PROCEDURE — 1101F PT FALLS ASSESS-DOCD LE1/YR: CPT | Mod: CPTII,S$GLB,, | Performed by: NURSE PRACTITIONER

## 2022-12-09 PROCEDURE — 1126F PR PAIN SEVERITY QUANTIFIED, NO PAIN PRESENT: ICD-10-PCS | Mod: CPTII,S$GLB,, | Performed by: NURSE PRACTITIONER

## 2022-12-09 PROCEDURE — 99999 PR PBB SHADOW E&M-EST. PATIENT-LVL IV: ICD-10-PCS | Mod: PBBFAC,,, | Performed by: NURSE PRACTITIONER

## 2022-12-09 PROCEDURE — 3288F PR FALLS RISK ASSESSMENT DOCUMENTED: ICD-10-PCS | Mod: CPTII,S$GLB,, | Performed by: NURSE PRACTITIONER

## 2022-12-09 PROCEDURE — 99213 PR OFFICE/OUTPT VISIT, EST, LEVL III, 20-29 MIN: ICD-10-PCS | Mod: S$GLB,,, | Performed by: NURSE PRACTITIONER

## 2022-12-09 PROCEDURE — 3074F SYST BP LT 130 MM HG: CPT | Mod: CPTII,S$GLB,, | Performed by: NURSE PRACTITIONER

## 2022-12-09 NOTE — PATIENT INSTRUCTIONS
Will not proceed with sleep study    Your pulmonary hypertension has worsened. Will need to discuss further with cardiologist.

## 2022-12-09 NOTE — PROGRESS NOTES
12/9/2022    Ayla Dela Cruz  Office Note    Chief Complaint   Patient presents with    4m f/u    Shortness of Breath       HPI:   12/9/2022- complaint of shortness of breath, varies in severity, followed by cardiologist. Difficult to walk even short distances with out stopping to rest, improves with rest.   Stopped Trelegy due to oral thrush.     Did not have Sleep study not sure why, had overnight pulse ox with no desaturation.     8/9/2022- recent right eye surgery July 2022; holding Trelegy due to inhaled steroid. Cough is resolved.    states loud snoring at night, complaint of daytime fatigue, worsening with time. Wakes up with generalized body aches and numbness in lower arms.     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 Has resolved. Followed by Dr. Jurado and Dr. Yeh cardiologists. Noticed breathing improved following procedure.  SOB- stable, not needing albuterol inhaler. Able to due daily activities with out stopping to rest as long as she takes her time. Stopped Trelegy for now.   Cough- recurrent complaint, non productive, no current cough.   Able to play flute with difficulty.    8/6/2021- states feeling well, Cough- decreased, states mild, associated with post nasal drip, recurrent complaint, non productive, using Trelegy most days with noticed benfit,   Shortness of breath- recurrent complaint, improves with albuterol rescue inhaler but not needing, going to gym to exercise with no difficulty.   GERD symptoms controlled on medications.     1/26/21-  Complaint of depression currently followed by Psychiatry. Depression medications worsens acid reflux. GERD worsens Asthma symptoms.   Cough- worsened since changing medications, causes gaging,voice is hoarse in last 2 weeks. Occasionally productive clear mucous.   Associated with chest tightness.  No wheeze, SOB- worsened, worse when changing positions,  feels palpitations in chest.     07/21/2020- patient is a 75-year-old female with atrial fib, DVT (2014) on pradaxa, kidney infarction, history of stroke, GERD, hyperlipidemia who follows with Dr. Byrson for asthma and chronic sinusitis.  She c/o chronic AVILA especially if she has to walk uphill or push baby stroller. She is improved after using breo. She had URI symptoms in 1/2020 and thinks she may have had COVID-19 with emesis, loss of smell and tasted, aching for about 3 wks.  She had antibody test which was negative. Her  was sick as well. He assists w/ history.  She is a never smoker but says she has been diagnosed with COPD. Sinuses doing well, uses flonase intermittently. She follows w/ cardiology, has had 3 ablations in the past and sometimes still has a fib.  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    Mark 15, 2019- breo  With  No rescue use, uses flonase, had syncope with  Ankle  Fx.      Oct 16, 2018 pt having sob.  Pt had asthma as child and outgrew.  Pt has had avila 2 yrs, no seasonal variation, no clear nocturnal worsening.  Pt has been on intermittent amiodarone with eval /rx Dr Spaulding.  Pt had a fib resolved.  pft in 2014 with vc 35%.  Pt had 3 ablations. On chr xarelto.  No h/o pe.  No edema.  On aldactone.  Pt had transient right paresis - resolved - tia/stroke.    Pt in er 10/14- no wheezes, place empirically on prednisone 40/d with good response.  With albuterol use once breathing felt nl - 1st within last 1-2 yrs.        The chief complaint problem is stable    PFSH:  Past Medical History:   Diagnosis Date    Anticoagulant long-term use     Anxiety     Arthritis     Atrial fibrillation     Cancer     skin    CHF (congestive heart failure)     Depression     DVT (deep venous thrombosis)     GERD (gastroesophageal reflux disease)     Glaucoma (increased eye pressure)     Hyperlipidemia     diet controlled    Hypertension     Interstitial lung disease      Localized hives 01/10/2020    Mild persistent asthma without complication 11/12/2018    Pacemaker     Pneumonia 01/31/2014    Stroke 06/03/2014    Stroke     TIA (transient ischemic attack)     TIA (transient ischemic attack)          Past Surgical History:   Procedure Laterality Date    2 heart ablations      3 in total    A-V CARDIAC PACEMAKER INSERTION Left 10/14/2021    Procedure: INSERTION, CARDIAC PACEMAKER, DUAL CHAMBER;  Surgeon: Fco Calderon MD;  Location: [facility] EP LAB;  Service: Cardiology;  Laterality: Left;  PAF/ [place] Arrthymias, Dual PPM, SJM, MAC, GP, 3 PREP    ANTERIOR VITRECTOMY Right 7/27/2022    Procedure: VITRECTOMY, ANTERIOR APPROACH;  Surgeon: Daniel Tan MD;  Location: [facility] OR;  Service: Ophthalmology;  Laterality: Right;    bilateral cataracts      CHOLECYSTECTOMY      COLONOSCOPY N/A 1/19/2017    Procedure: COLONOSCOPY and EGD;  Surgeon: Jonathan Schultz MD;  Location: [facility] ENDO;  Service: Endoscopy;  Laterality: N/A;    COLONOSCOPY N/A 10/10/2019    Procedure: COLONOSCOPY;  Surgeon: Alex Christian MD;  Location: [facility];  Service: Endoscopy;  Laterality: N/A;    ESOPHAGOGASTRODUODENOSCOPY N/A 10/14/2019    Procedure: EGD (ESOPHAGOGASTRODUODENOSCOPY);  Surgeon: Alex Christian MD;  Location: [facility];  Service: Endoscopy;  Laterality: N/A;    INJECTION OF ANESTHETIC AGENT AROUND MEDIAL BRANCH NERVES INNERVATING CERVICAL FACET JOINT Right 6/7/2018    Procedure: BLOCK, NERVE, FACET JOINT, MEDIAL BRANCH, CERVICAL;  Surgeon: Galo Clancy MD;  Location: [facility] OR;  Service: Pain Management;  Laterality: Right;  C4, 5, 6    OPEN REDUCTION AND INTERNAL FIXATION (ORIF) OF INJURY OF ANKLE Right 11/1/2018    Procedure: ORIF, ANKLE;  Surgeon: Wilfredo Aguero MD;  Location: [facility];  Service: Orthopedics;  Laterality: Right;    PARACENTESIS, EYE, ANTERIOR CHAMBER, WITH AQUEOUS REMOVAL Right 7/27/2022    Procedure: Anterior chamber washout, possible IOL removal;  Surgeon: Daniel Tan,  MD;  Location: [facility] OR;  Service: Ophthalmology;  Laterality: Right;    pyloristenosis      RADIOFREQUENCY ABLATION OF LUMBAR MEDIAL BRANCH NERVE AT SINGLE LEVEL Bilateral 9/21/2018    Procedure: RADIOFREQUENCY ABLATION, NERVE, SPINAL, LUMBAR, MEDIAL BRANCH, 1 LEVEL;  Surgeon: Galo Clancy MD;  Location: [facility] OR;  Service: Pain Management;  Laterality: Bilateral;  L3, 4, 5    RADIOFREQUENCY ABLATION OF LUMBAR MEDIAL BRANCH NERVE AT SINGLE LEVEL Bilateral 2/19/2019    Procedure: Radiofrequency Ablation, Nerve, Spinal, Lumbar, Medial Branch, 1 Level;  Surgeon: Galo Clancy MD;  Location: [facility] OR;  Service: Pain Management;  Laterality: Bilateral;  L3, 4, 5     RADIOFREQUENCY ABLATION OF LUMBAR MEDIAL BRANCH NERVE AT SINGLE LEVEL Bilateral 3/10/2020    Procedure: Radiofrequency Ablation, Nerve, Spinal, Lumbar, Medial Branch, 1 Level;  Surgeon: Galo Clancy MD;  Location: [facility];  Service: Pain Management;  Laterality: Bilateral;  L3,4,5 - Burned at 80 degrees C. for 60 seconds x 2 each site      RADIOFREQUENCY ABLATION OF LUMBAR MEDIAL BRANCH NERVE AT SINGLE LEVEL Bilateral 9/11/2020    Procedure: Radiofrequency Ablation, Nerve, Spinal, Lumbar, Medial Branch, 1 Level;  Surgeon: Galo Clancy MD;  Location: [facility];  Service: Pain Management;  Laterality: Bilateral;  L3, 4, 5 - Burned at 80 degrees C. for 60 seconds x 2 each site    RADIOFREQUENCY ABLATION OF LUMBAR MEDIAL BRANCH NERVE AT SINGLE LEVEL Bilateral 5/28/2021    Procedure: Radiofrequency Ablation, Nerve, Spinal, Lumbar, Medial Branch, 1 Level;  Surgeon: Galo Clancy MD;  Location: [facility] OR;  Service: Pain Management;  Laterality: Bilateral;  L3,4,5    RADIOFREQUENCY THERMAL COAGULATION OF MEDIAL BRANCH OF POSTERIOR RAMUS OF CERVICAL SPINAL NERVE Right 7/3/2018    Procedure: RADIOFREQUENCY THERMAL COAGULATION, NERVE, SPINAL, CERVICAL, MEDIAL BRANCH OF POSTERIOR RAMUS;  Surgeon: Galo Clancy MD;  Location: [facility] OR;  Service: Pain Management;  Laterality: Right;   C4,5,6 - Burned at 80 degrees C. for 75 seconds each site    RADIOFREQUENCY THERMAL COAGULATION OF MEDIAL BRANCH OF POSTERIOR RAMUS OF CERVICAL SPINAL NERVE Right 7/23/2019    Procedure: RADIOFREQUENCY THERMAL COAGULATION, NERVE, SPINAL, CERVICAL, POSTERIOR RAMUS, MEDIAL BRANCH;  Surgeon: Galo Clancy MD;  Location: [facility] OR;  Service: Pain Management;  Laterality: Right;  C4,5,6    RADIOFREQUENCY THERMAL COAGULATION OF MEDIAL BRANCH OF POSTERIOR RAMUS OF CERVICAL SPINAL NERVE Right 6/23/2020    Procedure: RADIOFREQUENCY THERMAL COAGULATION, NERVE, SPINAL, CERVICAL, POSTERIOR RAMUS, MEDIAL BRANCH;  Surgeon: Galo Clacny MD;  Location: [facility] OR;  Service: Pain Management;  Laterality: Right;  C4, 5, 6    RADIOFREQUENCY THERMOCOAGULATION Bilateral 9/10/2019    Procedure: RADIOFREQUENCY THERMAL COAGULATION LUMBAR;  Surgeon: Galo Clancy MD;  Location: [facility] OR;  Service: Pain Management;  Laterality: Bilateral;  L3,4,5 - Burned at 80 degrees C. for 60 seconds x 2 each site    skin cancer removal       TONSILLECTOMY       Social History     Tobacco Use    Smoking status: Never    Smokeless tobacco: Never   Substance Use Topics    Alcohol use: No    Drug use: No     Family History   Problem Relation Age of Onset    Arthritis Mother     Asthma Mother     Rheum arthritis Mother     Pneumonia Mother     Skin cancer Mother         unsure of type    Heart disease Father     Ulcers Father     Alzheimer's disease Maternal Uncle     Rheum arthritis Maternal Grandmother     Emphysema Maternal Grandfather     Cancer Maternal Grandfather         kidney    Kidney disease Maternal Grandfather     Cancer Paternal Grandmother         lung= smoker    Pneumonia Paternal Grandfather     Depression Son     Breast cancer Neg Hx     Ovarian cancer Neg Hx      Review of patient's allergies indicates:   Allergen Reactions    Gabapentin Hallucinations    Xarelto [rivaroxaban] Rash    Bactrim [sulfamethoxazole-trimethoprim] Other (See Comments)      "Upset stomach, dry heaves, confusion    Afrin (pseudoephedrine)     Amoxicillin-pot clavulanate      Other reaction(s): Mental Status Change    Atorvastatin      Other reaction(s): Joint pain    Baclofen     Baclofen (bulk) Nausea And Vomiting    Ciprofloxacin     Ciprofloxacin (bulk)     Decongest tabs      Other reaction(s): increased heart rate    Decongestant [pseudoephedrine hcl]     Erythromycin Other (See Comments)    Flecainide Hives     And SOB. No reaction to Lidocaine     Fluoxetine      Other reaction(s): heart palpitations  Other reaction(s): anxiety    Lisinopril Other (See Comments)     cough    Losartan Other (See Comments)     Hypotension with lightheadedness    Morphine Other (See Comments)     confusion    Tramadol Other (See Comments)     SOB, low BP    Venlafaxine     Venlafaxine analogues      Changes in BP and increases heart rate       Afrin [oxymetazoline] Palpitations    Caffeine Palpitations    Dabigatran etexilate Rash    Tizanidine Anxiety     dizziness       Performance Status:The patient's activity level is functions out of house.      Review of Systems:  a review of eleven systems covering constitutional, Eye, HEENT, Psych, Respiratory, Cardiac, GI, , Musculoskeletal, Endocrine, Dermatologic was negative except for pertinent findings as listed ABOVE and below:  Shortness of breath  fatigue    Exam:Comprehensive exam done. /80 (BP Location: Right arm, Patient Position: Sitting, BP Method: Pediatric (Automatic))   Pulse 73   Ht 5' 7" (1.702 m)   Wt 44.7 kg (98 lb 8.7 oz)   SpO2 99% Comment: on room air at rest  BMI 15.43 kg/m²   Exam included Vitals as listed, and patient's appearance and affect and alertness and mood, oral exam for yeast and hygiene and pharynx lesions and Mallapatti (M) score, neck with inspection for jvd and masses and thyroid abnormalities and lymph nodes (supraclavicular and infraclavicular nodes and axillary also examined and noted if abn), chest exam " included symmetry and effort and fremitus and percussion and auscultation, cardiac exam included rhythm and gallops and murmur and rubs and jvd and edema, abdominal exam for mass and hepatosplenomegaly and tenderness and hernias and bowel sounds, Musculoskeletal exam with muscle tone and posture and mobility/gait and  strength, and skin for rashes and cyanosis and pallor and turgor, extremity for clubbing.  Findings were normal except for pertinent findings listed below:  M1, Thin, BS Clear   No edema    Radiographs (ct chest and cxr) reviewed: view by direct vision   X-Ray Chest AP Portable   11/15/2021 enlarged heart, lungs clear    XR CHEST AP PORTABLE  10/14/2021 mild pulmonary edema     CXR 1/23/20- cardiomegaly, prominence of interstitial markings, peribronchial cuffing, RLL  infiltrates  CTA chest 2014- mosaicism consistent with air trapping, bibasilar infiltrates, small right pleural effusion, cardiomegaly with enlarged RV    Labs reviewed     Latest Reference Range & Units 07/23/22 10:19 08/04/22 10:41   CO2 23 - 29 mmol/L 24 27     Transthoracic echo (TTE) complete 9/8/22 The estimated PA systolic pressure is 60 mmHg. There is pulmonary hypertension.       PFT results reviewed 2014- spirometry shows severe restriction.  Lung volumes and DLCO were not performed       over night pulse ox  <88% 0 min 8/14/22    Plan:  Clinical impression is apparently straight forward and impression with management as below.     Ayla was seen today for 4m f/u and shortness of breath.    Diagnoses and all orders for this visit:    Pulmonary hypertension, with right sided congestive heart failure    Restrictive lung disease   - continue current medication regiment     Follow up in about 1 year (around 12/9/2023), or if symptoms worsen or fail to improve.    Discussed with patient above for education the following:      Patient Instructions   Will not proceed with sleep study    Your pulmonary hypertension has worsened.  Will need to discuss further with cardiologist.

## 2022-12-10 ENCOUNTER — PATIENT MESSAGE (OUTPATIENT)
Dept: CARDIOLOGY | Facility: CLINIC | Age: 77
End: 2022-12-10
Payer: MEDICARE

## 2022-12-11 RX ORDER — AMITRIPTYLINE HYDROCHLORIDE 25 MG/1
37.5 TABLET, FILM COATED ORAL NIGHTLY
Qty: 45 TABLET | Refills: 1 | Status: SHIPPED | OUTPATIENT
Start: 2022-12-11 | End: 2023-01-03 | Stop reason: SDUPTHER

## 2022-12-11 RX ORDER — LORAZEPAM 0.5 MG/1
0.5 TABLET ORAL 2 TIMES DAILY PRN
Qty: 60 TABLET | Refills: 0 | Status: SHIPPED | OUTPATIENT
Start: 2022-12-11 | End: 2023-02-08 | Stop reason: SDUPTHER

## 2022-12-16 ENCOUNTER — OFFICE VISIT (OUTPATIENT)
Dept: CARDIOLOGY | Facility: CLINIC | Age: 77
End: 2022-12-16
Payer: MEDICARE

## 2022-12-16 VITALS
OXYGEN SATURATION: 100 % | DIASTOLIC BLOOD PRESSURE: 65 MMHG | HEART RATE: 87 BPM | BODY MASS INDEX: 16.17 KG/M2 | WEIGHT: 103 LBS | RESPIRATION RATE: 19 BRPM | SYSTOLIC BLOOD PRESSURE: 139 MMHG | HEIGHT: 67 IN

## 2022-12-16 DIAGNOSIS — Z95.0 CARDIAC PACEMAKER IN SITU: ICD-10-CM

## 2022-12-16 DIAGNOSIS — E78.2 MIXED HYPERLIPIDEMIA: ICD-10-CM

## 2022-12-16 DIAGNOSIS — I27.20 PULMONARY HYPERTENSION: ICD-10-CM

## 2022-12-16 DIAGNOSIS — I50.32 CHRONIC DIASTOLIC HEART FAILURE: ICD-10-CM

## 2022-12-16 DIAGNOSIS — I95.1 ORTHOSTATIC HYPOTENSION: ICD-10-CM

## 2022-12-16 DIAGNOSIS — R41.89 COGNITIVE IMPAIRMENT: ICD-10-CM

## 2022-12-16 DIAGNOSIS — I48.0 PAROXYSMAL ATRIAL FIBRILLATION: Primary | ICD-10-CM

## 2022-12-16 DIAGNOSIS — R54 FRAIL ELDERLY: ICD-10-CM

## 2022-12-16 DIAGNOSIS — I11.0 HYPERTENSIVE LEFT VENTRICULAR HYPERTROPHY WITH HEART FAILURE: ICD-10-CM

## 2022-12-16 DIAGNOSIS — Z91.89 SEDENTARY LIFESTYLE: ICD-10-CM

## 2022-12-16 DIAGNOSIS — R79.89 ELEVATED LFTS: ICD-10-CM

## 2022-12-16 DIAGNOSIS — R06.09 DOE (DYSPNEA ON EXERTION): ICD-10-CM

## 2022-12-16 PROCEDURE — 3078F PR MOST RECENT DIASTOLIC BLOOD PRESSURE < 80 MM HG: ICD-10-PCS | Mod: CPTII,S$GLB,, | Performed by: INTERNAL MEDICINE

## 2022-12-16 PROCEDURE — 3288F FALL RISK ASSESSMENT DOCD: CPT | Mod: CPTII,S$GLB,, | Performed by: INTERNAL MEDICINE

## 2022-12-16 PROCEDURE — 1157F PR ADVANCE CARE PLAN OR EQUIV PRESENT IN MEDICAL RECORD: ICD-10-PCS | Mod: CPTII,S$GLB,, | Performed by: INTERNAL MEDICINE

## 2022-12-16 PROCEDURE — 1159F MED LIST DOCD IN RCRD: CPT | Mod: CPTII,S$GLB,, | Performed by: INTERNAL MEDICINE

## 2022-12-16 PROCEDURE — 1159F PR MEDICATION LIST DOCUMENTED IN MEDICAL RECORD: ICD-10-PCS | Mod: CPTII,S$GLB,, | Performed by: INTERNAL MEDICINE

## 2022-12-16 PROCEDURE — 99214 PR OFFICE/OUTPT VISIT, EST, LEVL IV, 30-39 MIN: ICD-10-PCS | Mod: S$GLB,,, | Performed by: INTERNAL MEDICINE

## 2022-12-16 PROCEDURE — 1101F PR PT FALLS ASSESS DOC 0-1 FALLS W/OUT INJ PAST YR: ICD-10-PCS | Mod: CPTII,S$GLB,, | Performed by: INTERNAL MEDICINE

## 2022-12-16 PROCEDURE — 93000 ELECTROCARDIOGRAM COMPLETE: CPT | Mod: S$GLB,,, | Performed by: INTERNAL MEDICINE

## 2022-12-16 PROCEDURE — 93000 EKG 12-LEAD: ICD-10-PCS | Mod: S$GLB,,, | Performed by: INTERNAL MEDICINE

## 2022-12-16 PROCEDURE — 3078F DIAST BP <80 MM HG: CPT | Mod: CPTII,S$GLB,, | Performed by: INTERNAL MEDICINE

## 2022-12-16 PROCEDURE — 1101F PT FALLS ASSESS-DOCD LE1/YR: CPT | Mod: CPTII,S$GLB,, | Performed by: INTERNAL MEDICINE

## 2022-12-16 PROCEDURE — 99999 PR PBB SHADOW E&M-EST. PATIENT-LVL IV: ICD-10-PCS | Mod: PBBFAC,,, | Performed by: INTERNAL MEDICINE

## 2022-12-16 PROCEDURE — 3288F PR FALLS RISK ASSESSMENT DOCUMENTED: ICD-10-PCS | Mod: CPTII,S$GLB,, | Performed by: INTERNAL MEDICINE

## 2022-12-16 PROCEDURE — 1157F ADVNC CARE PLAN IN RCRD: CPT | Mod: CPTII,S$GLB,, | Performed by: INTERNAL MEDICINE

## 2022-12-16 PROCEDURE — 3075F SYST BP GE 130 - 139MM HG: CPT | Mod: CPTII,S$GLB,, | Performed by: INTERNAL MEDICINE

## 2022-12-16 PROCEDURE — 99999 PR PBB SHADOW E&M-EST. PATIENT-LVL IV: CPT | Mod: PBBFAC,,, | Performed by: INTERNAL MEDICINE

## 2022-12-16 PROCEDURE — 3075F PR MOST RECENT SYSTOLIC BLOOD PRESS GE 130-139MM HG: ICD-10-PCS | Mod: CPTII,S$GLB,, | Performed by: INTERNAL MEDICINE

## 2022-12-16 PROCEDURE — 99214 OFFICE O/P EST MOD 30 MIN: CPT | Mod: S$GLB,,, | Performed by: INTERNAL MEDICINE

## 2022-12-16 NOTE — PROGRESS NOTES
Subjective:    Patient ID:  Ayla Dela Cruz is a 77 y.o. 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 Significant weight loss due to major depression off Limbitrol (stopped 2010), diet controlled T2DM. 6-months follow up.  PCP: Dr. Olivo, see annually, do labs  Opthalmologist: Daniel Tan MD  EP: Dr. Calderon  Orthopedic: Dr. Aguero  Surgeon: Dr. Gonsalez, and Dr. Dc  Rheumatologist: Dr. Pak  Prior cardiologist: Dr. Gibson, last seen over a year  Pulmonary: Marichuy Mcelroy NP  Psychologist: Galo Lipscomb, PhD, MP  Gyn: Dr. Jordan  GI: Dr. Schultz  Neurologist: Dr. Ramirez  Dermatologist: Dennis Corrigan  Pain: Dr. 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  Nicotine: never  Alcohol: none  Illicit drugs: none  Cardiac symptoms: AVILA  Home BP: 100/65, feeling of weakness  Medication compliance: yes, Jan sets it up.  Diet: regular  Caffeine: none  Labs: 1/2019 LDL 90.6 on Crestor 40 mg, normal TSH, CMP (albumin 3.1), normal CBC, Vit. D  10/2019 AST 95, Hgb 11.4  Lab Results   Component Value Date    TSH 1.194 09/20/2022        Lab Results   Component Value Date    HGBA1C 5.9 (H) 08/04/2022       Lab Results   Component Value Date    WBC 8.37 01/24/2020    HGB 11.7 (L) 01/24/2020    HCT 37.0 01/24/2020    MCV 94 01/24/2020     01/24/2020     Normal H&H in 11/15/2021  Lab Results   Component Value Date    WBC 8.78 07/23/2022     HGB 13.9 07/23/2022    HCT 44.0 07/23/2022     (H) 07/23/2022     07/23/2022       CMP  Sodium   Date Value Ref Range Status   08/04/2022 140 136 - 145 mmol/L Final     Potassium   Date Value Ref Range Status   08/04/2022 4.0 3.5 - 5.1 mmol/L Final     Chloride   Date Value Ref Range Status   08/04/2022 108 95 - 110 mmol/L Final     CO2   Date Value Ref Range Status   08/04/2022 27 23 - 29 mmol/L Final     Glucose   Date Value Ref Range Status   08/04/2022 81 70 - 110 mg/dL Final     BUN   Date Value Ref Range Status   08/04/2022 16 8 - 23 mg/dL Final     Creatinine   Date Value Ref Range Status   08/04/2022 0.7 0.5 - 1.4 mg/dL Final   09/24/2012 0.6 0.2 - 1.4 mg/dl Final     Calcium   Date Value Ref Range Status   08/04/2022 9.4 8.7 - 10.5 mg/dL Final   09/24/2012 9.9 8.6 - 10.2 mg/dl Final     Total Protein   Date Value Ref Range Status   07/23/2022 6.5 6.0 - 8.4 g/dL Final     Albumin   Date Value Ref Range Status   07/23/2022 3.8 3.5 - 5.2 g/dL Final     Total Bilirubin   Date Value Ref Range Status   07/23/2022 1.6 (H) 0.1 - 1.0 mg/dL Final     Comment:     For infants and newborns, interpretation of results should be based  on gestational age, weight and in agreement with clinical  observations.    Premature Infant recommended reference ranges:  Up to 24 hours.............<8.0 mg/dL  Up to 48 hours............<12.0 mg/dL  3-5 days..................<15.0 mg/dL  6-29 days.................<15.0 mg/dL       Alkaline Phosphatase   Date Value Ref Range Status   07/23/2022 86 55 - 135 U/L Final   09/24/2012 63 23 - 119 UNIT/L Final     AST   Date Value Ref Range Status   07/23/2022 63 (H) 10 - 40 U/L Final   09/24/2012 26 10 - 30 UNIT/L Final     ALT   Date Value Ref Range Status   07/23/2022 61 (H) 10 - 44 U/L Final     Anion Gap   Date Value Ref Range Status   08/04/2022 5 (L) 8 - 16 mmol/L Final   09/24/2012 12 5 - 15 meq/L Final     eGFR if    Date Value Ref Range Status    07/23/2022 >60.0 >60 mL/min/1.73 m^2 Final     eGFR if non    Date Value Ref Range Status   07/23/2022 >60.0 >60 mL/min/1.73 m^2 Final     Comment:     Calculation used to obtain the estimated glomerular filtration  rate (eGFR) is the CKD-EPI equation.        @labrcntip(troponini)@    BNP   Date Value Ref Range Status   08/12/2022 90 0 - 99 pg/mL Final     Comment:     Values of less than 100 pg/ml are consistent with non-CHF populations.   }   Lab Results   Component Value Date    CHOL 159 02/17/2022    CHOL 197 01/13/2021    CHOL 142 01/31/2020     Lab Results   Component Value Date    HDL 57 02/17/2022    HDL 51 01/13/2021    HDL 42 01/31/2020     Lab Results   Component Value Date    LDLCALC 87.8 02/17/2022    LDLCALC 120.4 01/13/2021    LDLCALC 82.4 01/31/2020     Lab Results   Component Value Date    TRIG 71 02/17/2022    TRIG 128 01/13/2021    TRIG 88 01/31/2020     Lab Results   Component Value Date    CHOLHDL 35.8 02/17/2022    CHOLHDL 25.9 01/13/2021    CHOLHDL 29.6 01/31/2020      Last Echo: 9/2022  Last stress test: 9/2021, Lexiscan  Cardiovascular angiogram: none  ECG: SA, rate 67, left axis, pulmonary pattern with low anterior forces, QTc 456 msec  Fundoscopic exam: biannually, no retinopathy, being treated for glaucoma.    In 6/2014:  Hospitalized 6/3/2014 for acute right sided weakness and slurred speech  LEONARDO Colindres Ayla Dela Cruz is a 69 y.o. female admitted to the services of hospital medicine via the ER for acute CVA. Presented with difficulty speaking, facial drooping and weakness of the right upper and lower extremities. She missed the time window for tPA. ST/PT/OT as well as cardiology and neurology were consulted. Dr. Jurado of cardiology managed A fib. Patient rapidly improved functioning with PT/OT/ST. Currently her goals with PT are met as she is ambulating over 400 feet independently.  She was not approved for IP rehab and refuses SNF. She will be discharged home with  outpatient PT/ST/OT evaluation and treatment.    Neurocardio work up  CT head - Mild involutional changes and findings suggesting mild microvascular ischemic change with no acute intracranial findings    Carotid US - No hemodynamically significant stenosis is noted by velocity criteria involving either internal carotid artery.  A hemodynamically significant stenosis is defined as a stenosis of greater than 50% of the internal carotid artery vessel lumen    ECHO, 6/4/2014, CONCLUSIONS     1 - Concentric hypertrophy. Wall thickness is mildly increased, with the septum and the posterior wall each measuring 1.1 cm across.    2 - Normal left ventricular systolic function (EF 60-65%).     3 - Mild mitral regurgitation.     4 - Left ventricular diastolic dysfunction.     5 - Pulmonary hypertension. estimated PA systolic pressure is 64 mmHg.    6 - Normal right ventricular systolic function .     7 - Suggestion for increase in LV mass from echo on 3/18/2014..     Echo bubble study also negative. Had episode of PAF during monitoring and convert with restart of Flecainide.   Since DC, improving strength in the right hand, right leg feels normal but tire easier. Speech improving. To receive PT/OT/speech today.  Medications reviewed - will DC ASA for now, and know the effects of the Limbitrol and Ativan, uses prn rarely. Some loss of appetite and taste.    Active problems in hospital  Acute left hemispheric CVA, MRI suggest multiple areas, was not on OAC nor antiplatelet Rx  PAF with high CHADS-VAS score, post ablations and DCCs  HTN with LVH  Interstitial lung disease  Pulmonary hypertension  Hyperlipidemia was not on statin    Since visit of 6/20, problem with peripheral swelling, now taking Lasix daily. Last hospitalization / CMP, 6/3 showed K+ 3.4 with normal renal function. Also noted AVILA along with exertional chest heaviness, on-and-off over past several years. Noted it with walking and have to rest. Can last up to an  hour. Max grade 10/10, yesterday during the day.  in hospital. Also quite anxious, stopped Limbitrol due to side effects, managed by Dr. Olivo. Quite sedentary and major decrease in appetite, due to depression. No Psychiatrist. Home BP high 175/97. ECG shows NSR, rate 61, right axis, nonspecific T waves abnormalities, prolong QTc 483 msec. Low anterior forces.    Holter, 6/30/2014:  PREDOMINANT RHYTHM  1. Sinus rhythm with heart rates varying between 43 and 67 bpm with an average of 55 bpm.     VENTRICULAR ARRHYTHMIAS  1. There were frequent polymorphic PVCs totalling 1388 and averaging 40 per hour.  There was 1 couplet.    2. There were no episodes of ventricular tachycardia.    SUPRA VENTRICULAR ARRHYTHMIAS  1. There were very rare PACs totalling 47 and averaging 1 per hour.     2. There were no episodes of sustained supraventricular tachycardia.    SINUS NODE FUNCTION  1. There was no evidence of high grade SA khai block.     AV CONDUCTION  1. There was no evidence of high grade AV block.     DIARY  1. The diary was returned, but not completed    MISCELLANEOUS  1. This was a tape of adequate length (34 hrs).     Since visit of 7/24, better, reviewed by Dr. Olivo and started antidepressant Lexapro. BMP still showed mild hypokalemia of 3.3, not using Lasix at this time. Still feeling fatigue, anxiety, and request refill for Nexium. Discussed need for GI review on chronic PPI. Lack of appetite.  Lexiscan, 7/30/2014, - Nuclear Quantitative Functional Analysis:   LVEF: 66 % (normal is 55 - 69)  LVED Volume: 50 ml (normal is 60 - 98)  LVES Volume: 17 ml (normal is 20 - 42)    Impression: NORMAL MYOCARDIAL PERFUSION  1. The perfusion scan is free of evidence for myocardial ischemia or injury.   2. There is a mild intensity fixed defect in the anteroseptal wall of the left ventricle, secondary to breast attenuation.   3. Resting wall motion is physiologic.   4. Resting LV function is normal.  (normal is 55 -  "69)  5. The ventricular volumes are normal at rest and stress.   6. The extracardiac distribution of radioactivity is normal.     No problem with spironolactone, BP improved, home BP similar to office readings.    Since visit of 8/14, had some distress and home monitor showed HR of 40, felt "bad" and nervous to ED, note reviewed, ECG and final pulse count 57-58. Labs reviewed - normal renal function and K+ 3.8. Still taking one tab of K+ daily. Not using Lasix. Explained HR discrepancy may be due to VPCs. Reviewed by Ms. Fermin and Lexapro increased to 20 mg, 2 days ago. Feeling "a lot better". Sleeping better. Still sedentary but ready to be more active. Eating better have lost additional 5 lbs since 8/2014.    Since visit of 9/5, still with speech therapy, noted progressive near syncope with SOB and irregular heart beats. Also noted some ankle swelling over the past 2 weeks. ECG shows marked bradycardia, rate 48, right axis, pulmonary pattern, 1st degree AVB. Wellbutrin 100 mg daily along with Lexapro 20 mg by Ms. Fermin NP. BMP showed K+3.5. To use Lasix PRN    Since visit of 9/25, still with marked AVILA, only able to walk about 30' before having to stop. Bothered by increase palpitations during tapering of BB. No definite lung problem, evaluated this year. ECG shows sinus dewayne, rate 53, VPC, RBBB with suggestion of septal MI. No evidence for anemia - CBC 9/3/2014 was normal. Just started doing some activities with arm and leg exercise for the last 2 weeks, Doing some OT alternating with PT every other day.  CXR, 6/3/2014 - Lungs are clear of infiltrate and costophrenic sulci are sharp.  The cardiomediastinal silhouette appears stable.    PET scan, 4/2014 - 1.A hypermetabolic left pulmonary mass is noted with an SUV of 3.0 maximally.  A neoplastic, infectious, or granulomatous process is on the differential. Clinical correlation is suggested.  Close follow-up for resolution or biopsy would be suggested    2.  " "Elevated right-sided heart pressures are suspected with a large right atrium    3.  Right-sided pleural effusion    4.  Hypermetabolic thyroid gland asymmetrically on the right.  This may relate to thyroiditis, Graves' disease, or neoplastic process.  Ultrasound may be helpful.    5.  Small hypermetabolic focus in the expected location of the left ovary, a female pelvis ultrasound may be helpful for further evaluation.  This could relate to a uterine fibroid that has necrosed or infarcted    Biopsy of lung nodule on 5/1/2014 - negative for CA    CTA, 4/2014 - No evidence of pulmonary thromboembolism.    2.  Enlarged cardiac silhouette and secondary findings of elevated right heart pressures with diffuse mosaic lung pattern and right basilar pleural fluid suggesting cardiac decompensation/heart failure.    3. Unchanged 1 cm nodular density within the left upper lobe which previously demonstrated hypermetabolism by PET/CT and unchanged mediastinal lymphadenopathy.    4.  Subpleural nodular density within the right upper lobe is unchanged when compared with the previous study and could represent an area of remote fibrotic scarring.    5.  Heterogeneous appearance of the spleen, possibly due to the phase of contrast enhancement.  Evolving splenic infarction is not entirely excluded.    6. Suggestion of colonic wall thickening involving the descending colon.  Please correlate for symptoms of colitis.    Since visit of 10/14, rehospitalized for HF on 10/26 to 10/29/2014.  DCS - "Ayla Dela Cruz is a 69 y.o. female admitted to the services of hospital medicine via the ER for acute diastolic heart failure. C/o severe SOB and increase in leg swelling over the past two days. Under the care of Dr. Jurado. Recently was taken off metoprolol. History of paroxsymal atrial fib. Hospitalized here in June in this year for acute CVA. It was at that time, pt started Xarelto. Deficits has resolved. No history of heart failure. " "    Hospital Course: The patient was admitted with acute CHF biventricular and mild elevation of her troponin's at 0.029 - 0.035 and therefore an anginal equivalent was suspected. The patient also had significant hypertension upon admission and although she was not in atrial fibrillation, her EKG showed a significant first degree AV block and RBBB. Discussion was made as to whether or not to decrease the patient flecainide; however due to the risk of her going back into atrial fibrillation this was not done. Upon discharge the patient's lisinopril was increased to 10 mg and aldactone was added."    RHC done HEMODYNAMIC RESULTS:    LVEDP (Pre): 24 mmHg  BP: 166/104    Air rest:  PA: 90/34 (54)  PW:  (24)  RVEDP: 16  RA:  (16)    C. SUMMARY:    1. Diastolic dysfunction.  2. - Kee CO 2.64 L/min  3. - Mean systemic pressure 108.6 mmHG, stage II HTN  4. - SVR 41.16 Wood Units  5. - PVR 11.36 Wood Units  6. - Pulmonary HTN due to left sided heart disease and stage II HTN    D. RECOMMENDATIONS:    1. Routine post-cath care.  2. Cardiac rehab referral.  3. Follow up with Dr. Barrett Jurado.  4. - Aggressive BP lowering and diuresis    Repeat ECHO 11/5/2014 CONCLUSIONS     1 - Concentric remodeling. Septal wall thickness is increased, and posterior wall thickness is mildly increased, with the septum measuring 1.3 cm and the posterior wall measuring 1.1 cm across.    2 - Mildly to moderately depressed left ventricular systolic function (EF 40-45%).     3 - Left ventricular diastolic dysfunction. E/e'(lat) is 16    4 - Right ventricular enlargement with mildly depressed systolic function.     5 - Pulmonary hypertension. estimated PA systolic pressure is 56 mmHg.     6 - Intermediate central venous pressure.     7 - No evidence of intracardiac shunt.     8 - No significant change from Echo of 6/4/2014.    Active problems:  Progressive severe SOB, functional class IV  Diastolic dysfunction, elevated BNP and Troponin  Hypokalemia due " "to diuretics  Secondary Pulmonary HTN with peripheral edema  HTN  History of CVA 6/3/2014  PAF controlled with Flecanide  Marked bradycardia with BB  On OAC  Hematuria    At home receiving PT, OT and speech Rx twice a week, with HH nurse once a week, no problem with medications. Feeling better, sleeping well. Home /73.    Since visit of 11/14, feeling "much better", concern of occasional HTN, home BP /66-99, HR . Lot more active, no problem. Not using walker, no CP, SOB, nor dizziness. Recent BMP - normal renal function and K+ 4.1.    Since visit of 12/19/2014, worry about HTN home readings similar to office up to . Morning reading is higher. No HA nor visual abnormalities. Xanax has helped but afraid to use too much. ECG shows sinus arrhythmia, rate of 65, late transition and LAFB. Also bothered with dry cough with the Lisinopril. And started to have return of arm pains that was attributed to atorvastatin, on Crestor. Discussed options.     Since visit of 1/16/2015, noted frequent nocturia, 8-10 times, taking losartan-HCT at night time. Have stopped using Lasix and spironolactone for past 2 months. More active without much problem. Labs normal renal function, K+ 4.2, BNP now 37, A1C 5.6%.    Since visit of 2/18/2015, improving, no further dizziness, some SOB when "stressed", usually helped with alprazolam, use only prn, about 3-6 times weekly. Compliant with medications. Been off Crestor for 6 weeks with concern of muscle problem. Home BP is fine, similar to office.     Since visit of 4/15, appetite returned, feeling a lot better. Active, walking twice a week for about half an hour. Also do calisthenic about twice a week, no problem. Seeing Dr. Zavaleta for generalized pruritis for past 3 months. Cardiac wise less AVILA. Sleeping well. Labs in 5/2015, normal CBC without eosinophilia, thyroid normal, ferritin level low normal at 21. Off Crestor for couple months due to fear of muscle pain but did " "not find much difference being off medication. Last Lipid in 11/2014 was good, on daily dose of Crestor. Home /79.    Since visit of 7/2/2015, feeling "great", very active without problem, no more AVILA nor dizziness with standing. Compliant with medications, don't miss. Using Tylenol 500 mg BID for arthritis. Notice easy bruising on Xarelto. Home /10.  Holter - PREDOMINANT RHYTHM  1. Sinus arrhythmia with heart rates varying between 46 and 110 bpm with an average of 73 bpm.     VENTRICULAR ARRHYTHMIAS  1. There were very rare PVCs recorded totalling 8 and averaging less than 1 per hour.     2. There were no episodes of ventricular tachycardia.    SUPRA VENTRICULAR ARRHYTHMIAS  1. There were very frequent PACs totalling 3054 and averaging 132 per hour.  There were 29 bigeminal cycles.  There were 103 couplets.    2. 3% of complexes.    3. There were no episodes of sustained supraventricular tachycardia.    SINUS NODE FUNCTION  1. There was no evidence of high grade SA khai block.     AV CONDUCTION  1. There was no evidence of high grade AV block.     2. The longest RR interval was 1565 msec.     DIARY  1. The diary was properly completed.     2. There was 1 episode of skipping beats reported. The corresponding rhythm strips revealed the following:             During event 1 the rhythm was sinus rhythm at 75 bpm.     3. There was 1 episode of skipped beat reported. The corresponding rhythm strips revealed the following:             During event 1 the rhythm was sinus arrhythmia at 63 bpm.     MISCELLANEOUS  1. This was a tape of adequate length (23 hrs).     Since visit of 10/15, place on new antidepressant, Venlafaxine 75 mg daily, tried 2 and developed rapid HR to 125 bpm, feels more anxious, now switched to Citalopram. Also noted the daily dose of Lorazepam does not seem adequate. Orthostatic VS is positive with lying 142/73, , standing 120/62, . Been taking spironolactone 25 mg for last 3 " "days. Does feel thirsty. Labs reviewed A1C 5.8%, CBC, BMP were WNL. Lipid shows LDL 99, non-. Goal preferred is <70 and < 100. No problem with 10 mg of Crestor. Had vacation at [place] and [place], walked all over without problem.    Since visit of 1/18/2016, no new problem. 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 Labs with , non-, hsCRP at 2.56.     In 10/2016, had acute GB attack with rupture in 8/2016, then tried on Wellbutrin took only 1-2 tabs and BP up to 201/100 with head tightness. Subsequently back to normal, the last 2.5 days took Losartan bid. Discussed Rx options for HTN. Advised to be more active, regular exercise. Lab reviewed LDL in 8/2016 93.     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 Still with daily palpitations, last up to half hours. Have electronic watch, David Barroso, since 9/2016, suppose harmonize patient with the universe. Feeling better if on during the day. Lot of walking at school, no problem.   Holter in 10/2016 - PREDOMINANT RHYTHM  1. Sinus rhythm with heart rates varying between 52 and 144 bpm with an average of 75 bpm.     2. Paroxysmal atrial fibrillation    VENTRICULAR ARRHYTHMIAS  1. There were occasional mostly monomorphic PVCs totalling 596 and averaging 13 per hour.     2. There were no episodes of ventricular tachycardia.    SUPRA VENTRICULAR ARRHYTHMIAS  1. There were frequent PACs totalling 2982 and averaging 69 per hour.  There were 69 bigeminal cycles.  There were 55 couplets.    2. There were no episodes of sustained supraventricular tachycardia.    3. Paroxysmal atrial fibrillation was noted in the the study.     SINUS NODE FUNCTION  1. There was no evidence of high grade SA khai block.     AV CONDUCTION  1. There was no evidence of high grade AV khai filtering.     2. The longest RR interval was 1725 msec.     DIARY  1. The diary was returned, but not completed    MISCELLANEOUS  1. This was a tape of adequate " "length (43 hrs).     ECHO CONCLUSIONS     1 - Hyperdynamic left ventricular systolic function (EF 65-70%).     2 - Normal left ventricular diastolic function.     3 - Normal right ventricular systolic function .     4 - Pulmonary hypertension. The estimated PA systolic pressure is 64 mmHg.     5 - Less evidence for LVH from Echo in 11/2014.     In 5/2017, bothered by recurrent PAF with AVILA after 10 feet walking. Felt better before on Flecainide 100 mg bid, but Holter continued to show PAF. Attempt up titration, problem with itching, switched to propafenone 150 mg tid, continued with itching and reviewed by allergist, treated with 10 days of cortisone with benefit. No hive and no itching, just don't feel good due to the irregular rhythm. History reviewed, had 2 prior AF ablation, last in [place], [place] in around 2000, recall being told not to do again. Recall seeing an [facility] EP doc but didn't like him. Discussed various options. Will stop antiarrhythmic for now, will be going on a 16 days trip to [place]. Reviewed prior medications, have taken Tenormin, metoprolol tartrate, and short-acting diltiazem in the past without problem. Refer to Dr. Calderon for review. ECG in AF, rate 99, PRWP, LAFB    In 11/2017, post AF ablation, felt good for a few weeks, noted some SOB and had review with Dr. Calderon on 11/1 - 72 year old female with a longstanding history of atrial arrhythmias. Her [id number] Score is 5 (age, female, TIA, HTN) so she should remain on anticoagulation indefinitely. She has failed Flecainide. She is now 6 weeks post-PVI and MA flutter line, and maintaining sinus rhythm on low-dose Amiodarone. Given her intermittent AVILA which started post-ablation, I have stopped Amiodarone which I think is the likely culprit. I instructed her to continue taking Lasix PRN, as well as metoprolol and Xarelto." ECG on 11/1 was in NSR, rate 61, PRWP.  Recommended to stop amiodarone but then started to feel the palpitation " "daily, felt nervous and at the same time being taper down on the nightly Ativan. Did have some breakthrough anxiety attacks. Also had strained the upper back and right arm / shoulder, requesting some Tramadol, tried Jan's Tramadol with good relief. Understand this is an opoid. Used Excedrin with caffeine and bothered by more palpitations.    In 3/2018, here for recurrent palpitations, no inciting factors over the past 6 weeks. Had review with Dr. Calderon in 2/2018 - "72 year old female with a longstanding history of atrial arrhythmias and prior ablations at outside institutions. Her [id number] Score is 5 (age, female, TIA, HTN) so she should remain on anticoagulation indefinitely. She is now 5 months post-PVI and MA flutter line, and maintaining sinus rhythm off Amiodarone. The plan is to continue Xarelto, and to see me again in 6 months." Palpitations appears associated with AVILA, had difficulties walking in house or up a ramp. Started 100 mg of amiodarone last week, daily, feels some benefit. ECG today in Sinus rhythm vs wandering atrial pacemaker with frequent PJC, rate 59. Also taking Losartan in the morning appears more effective.  Holter 11/2017 - PREDOMINANT RHYTHM  1. Sinus arrhythmia with heart rates varying between 39 and 102 bpm with an average of 58 bpm.     VENTRICULAR ARRHYTHMIAS  1. There were occasional PVCs totalling 728 and averaging 15 per hour.  There were 5 bigeminal cycles.     2. There were no episodes of ventricular tachycardia.    SUPRA VENTRICULAR ARRHYTHMIAS  1. There was no supraventricular ectopic activity.    SINUS NODE FUNCTION  1. There was no evidence of high grade SA khai block.     AV CONDUCTION  1. There was no evidence of high grade AV block.     2. The longest RR interval was 1820 msec.     DIARY  1. The diary was not returned    MISCELLANEOUS  1. There were occasional hookup related artifacts. Overall, the study was of good quality.     2. This was a tape of adequate length " "(48 hrs).     in 5/2018, no new problem, still concern of AVILA, usually with walking, variable as little 10 feet or fine with some long walk, can play flute for an hour but find difficulties in holding a note. Off daily amiodarone, uses it prn for palpitation, find helpful. Labs reviewed from 1/2018, TSH, Vitamin D, LDL 68.2, A1C 5.9%, CMP - normal renal function, K+ 3.6. To go to [place] for a month in 8/2018.    In 9/2018, return from a month family reunion trip to [place]. Problem with hypotension with Tramadol and Losartan. Had review with Dr. Calderon on 9/5 "Ayla Dela Cruz is a 73 year old female with a longstanding history of atrial arrhythmias and prior ablations at outside institutions. Her [id number] Score is 5 (age, female, TIA, HTN) so she should remain on anticoagulation indefinitely. She is now 11 months post-PVI and MA flutter line, and maintaining sinus rhythm off Amiodarone. However, she is feeling poorly, with frequent PACs and borderline hypotension. The plan is to stop Losartan, echo in next several weeks, Holter monitor in 6 weeks, and follow-up with me in 8 weeks."  Off both medication on 8/31, no problem now. No heart worry. Denies any CP nor SOB. Still teaching. ECG on 9/5 shows NSR with sinus arrhythmia.    In 3/2019, return for follow up, had syncopal spell with hypotension at [employer] required reconstruction of the right ankle, now in PT, doing well. No heart complication, no AF. LDL 90.6 in 1/2019. 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 No heart worries, no CP nor SOB, much better, breathing improved with daily Breo use. Discussed option with NOAC.    In 9/2019, 6 months review, concern of bruising with Xarelto, recall problem with dark stool with Eliquis. Discussed option.     In 1/2020 return due to allergic reaction to Pradaxa, had to stop Eliquis for similar problems - hives and rash, difficult to sleep. Also problem with embolism when off NOAC for 7 days. " "Discuss alternatives.    In 3/2020, same problem with Savaysa, noted about an hour of PAF this morning, felt nervous. Troubled by recent viral infection and also pain injection with steroid, which helped the rash temporary. Would like to proceed with mechanical alternative. History include past use of Coumadin for about 2 years, had some difficult with GIB and also required up to 3 times a week testing.    In 8/2020, here for 6-months review. Troubled more with JOSE with quarantine. Working with therapist. Keeping busy with working on a new book, music also. No heart worries.    In 8/2021, return for annual review. Problem with requiring medication changes for major depression with anorexia and malnutrition. Weight loss of 20 lbs and now improving. No cardiac issue except for slow heart rate down to 52 bpm, asymptomatic.    Fco Calderon MD noted "75 year old female with a longstanding history of atrial arrhythmias and prior ablations at outside institutions. Her [id number] Score is 5 (age, female, TIA, HTN) so she should remain on anticoagulation indefinitely. She is now >3 years post-PVI and MA flutter line, and is maintaining sinus rhythm off Amiodarone.      Ms. Dela Cruz had a likely cardioembolic event after stopping AC for a week. Ms. Dela Cruz wants to be on an anticoagulant she can crush. I have switched her back to eliquis 5 mg bid. She thinks her pruritis was from [employer] laundry detergent and not eliquis.     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 I have told her to contact Dr. Pedraza if she decides she can no longer take anticoagulants so she can discuss details of procedure with him further."    In 1/2022, return for follow up. Post PPM in 10/2021, one episode of near syncope with " head injury, no clear etiology, no problem over the past 2 months. Noted some morning dizziness with  to 110 and HR in the 80s.. Not as active as before and also noted some cognitive dysfunction with loss of ID. No cardiac complaints. Medication reviewed, on low-dose BB for PAF rate control.    Echo 2/2021 - Mild concentric hypertrophy and normal systolic function. The estimated ejection fraction is 60%  There is no evidence of intracardiac shunting.  Small circumferential pericardial effusion.  Mild aortic regurgitation.  Moderate tricuspid regurgitation.  Mild to moderate pulmonic regurgitation.  There is moderate tricuspid valve stenosis.  Moderate right atrial enlargement.  Normal right ventricular size.  Mild mitral regurgitation.  Mild left atrial enlargement.  Intermediate central venous pressure (8 mmHg).  The estimated PA systolic pressure is 53 mmHg.  There is pulmonary hypertension.  Normal left ventricular diastolic function.    Lexiscan 9/2021 - Impression:     1.  Scintigraphically negative for ischemia.  2. Fixed defect along the septal wall is suspicious for a small infarct.  3. The global left ventricular systolic function is normal with an LV ejection fraction of 80 % and no evidence of LV dilatation.  4. Wall motion is normal.    In 7/2022, return post eye operation, unable to be active due to vision, now able to see. Very weak and frail due to anorexia and sedentary status. Noted some heart racing for past 2 days. Back to NSR today. Under nutritional review.    HPI comments: in 12/2022, return for 6-months follow-up. Noted some AMS with standing and lower BP, now weaning off amitriptyline. Also working with eating disorder and is eating better. Working with new Psychiatrist. Discussed pulmonary hypertension, too frail for medications.      Echo 9/2022 - The left ventricle is normal in size with moderate concentric hypertrophy and normal systolic function.  The estimated ejection fraction is  60%.  Indeterminate left ventricular diastolic function.  Normal right ventricular size with normal right ventricular systolic function.  Mild left atrial enlargement.  Moderate right atrial enlargement.  Mild aortic regurgitation.  Mild mitral regurgitation.  Moderate tricuspid regurgitation.  Mild pulmonic regurgitation.  Normal central venous pressure (3 mmHg).  The estimated PA systolic pressure is 60 mmHg.  There is pulmonary hypertension.    PPM check 11/2022 - RA pacing 32%, RV pacing 7.9%   Atrial arrhythmias: AMS [id number] at 2m 40s w/egms c/w afib/CVR   AT/AF burden: <1%  Battery status/longevity (estimated): 8.2-10 years      ROS     Constitutional: negative for chills, have chronic fatigue, no fevers, sweats, no further slurred speech, and no dysarthria, have lost of appetite with JOSE, intolerance to heat, bruises easily on NOAC and steroid,   Eyes: negative for icterus, redness and visual disturbance, have visual halo, recurrence of bleed in the right Iris  Respiratory: negative for asthma, cough have resolved off ACE-I, no dyspnea on exertion, SOB with HR 85 to 100 bpm, have lifelong snoring, [place] score 6 improved down to 4, no hemoptysis, pleurisy/chest pain and wheezing  Cardiovascular: negative for chest pain, chest pressure/discomfort, no further orthostatic dizziness, and still some palpitations but have irregular heart beats, no paroxysmal nocturnal dyspnea and syncope  Gastrointestinal: negative for abdominal pain, nausea and vomiting, have GERD and dysphagia (psychologic)  Musculoskeletal:positive for arthralgias, and less right sided muscle weakness with improvement in leg strength, improved bilateral ankle pains - OA and no myalgias  Neurological: negative for coordination problems, gait problems, headaches, paresthesia, have some tremors but able to draw, loss of voice at times, and no vertigo  Psych: positive for depression, nervousness, anxiety, less stressed due to 's have  "improved  Skin - no further pruritic rash, have dryness    Answers submitted by the patient for this visit:  Review of Systems Questionnaire (Submitted on 12/12/2022)  activity change: No  unexpected weight change: No  neck pain: No  hearing loss: No  rhinorrhea: No  trouble swallowing: Yes  eye discharge: No  visual disturbance: No  chest tightness: No  wheezing: No  chest pain: No  palpitations: Yes  blood in stool: No  constipation: No  vomiting: No  diarrhea: No  polydipsia: No  polyuria: No  difficulty urinating: No  hematuria: No  menstrual problem: No  dysuria: No  joint swelling: No  arthralgias: No  headaches: No  weakness: Yes  confusion: No  dysphoric mood: Yes      Objective:    Physical Exam - orthostatic VS: sitting 139/65, standing 105/57    General appearance: alert, appears stated age, cooperative and no distress, decrease skin turgor, RA O2 sat. 100%  Head: Normocephalic, without obvious abnormality, atraumatic  Eyes:  conjunctivae/corneas clear. PERRL, EOM's intact.    Neck: no adenopathy, no carotid bruit, no JVD, supple, symmetrical, trachea midline and thyroid not enlarged, symmetric, no tenderness/mass/nodules  Lungs:  clear to auscultation bilaterally  Chest wall: no tenderness  Heart: regular rate and rhythm, normal S1, S2 normal, click, rub or gallop    Abdomen: soft, non-tender; bowel sounds normal; no masses,  no organomegaly, waist 31" down to 27" hip 42"  Extremities: extremities no further weakness of the right upper extremity, atraumatic, no cyanosis and have no edema, minor varicose veins  Pulses: 2+ and symmetric    Assessment:       1. Paroxysmal atrial fibrillation, ablations X 3 1995, 1997, 9/2017, CHADS-VAS score 5, HAS-BLED score 5     2. Sedentary lifestyle, 6/2014    3. Chronic diastolic heart failure, 2014    4. Frail elderly    5. AVILA (dyspnea on exertion)    6. Orthostatic hypotension    7. Mixed hyperlipidemia    8. Elevated LFTs    9. Pulmonary hypertension, without RV " dysfunction    10. Hypertensive left ventricular hypertrophy with heart failure    11. BMI less than 19,adult    12. Cardiac pacemaker in situ, [employer], dual chamber, 10/14/2021    13. Cognitive impairment         Plan:       Ayla was seen today for follow-up.    Diagnoses and associated orders for this visit:    Paroxysmal atrial fibrillation, ablations X 3 1995, 1997, 9/2017, CHADS-VAS score 5, HAS-BLED score 5     Sedentary lifestyle, 6/2014    Chronic diastolic heart failure, 2014    Frail elderly    AVILA (dyspnea on exertion)    Orthostatic hypotension    Mixed hyperlipidemia    Elevated LFTs    Pulmonary hypertension, without RV dysfunction    Hypertensive left ventricular hypertrophy with heart failure    BMI less than 19,adult    Cardiac pacemaker in situ, [employer], dual chamber, 10/14/2021    Cognitive impairment    - All medical issues reviewed, continue current Rx   - Warning signs of MI and stroke given, if symptoms last more than 5 minutes, stop immediately and call 911, then chew 2-4 low-dose ASA (81 mg).  - Need to remain well hydrated but avoid drinking within 4 hours of bedtime. Recommend at least 90 oz of fluid daily per IOM ([facility]stitute of Medicine) suggestion.  - CV status and off antiarrhythmic, all medications reviewed, patient acknowledge good understanding.  - Recommend healthy living: healthy diet and regular exercise aiming for fitness, restorative sleep and weight control  - Recommend using Tylenol as first line pain medications.  - Instruction for Mediterranean diet and heart healthy exercise given.  - Need good exercise program, 4 key elements: 1. Aerobic (walking, swimming, dancing, jogging, biking, etc, 2. Muscle strengthening / resistance exercise, need to do 2-3 times weekly, 3. Stretching daily, good stretch takes a whole  total minute. 4. Balance exercise daily.  - Encourage activities as much as tolerated. Any activity is better than none!   - Highly recommend  30-60 minutes of exercise daily, can have Sunday off, with 2-3 sessions of muscle strengthening weekly. A  would be very helpful.  - Recommend at least biannual cardiovascular evaluation in view of her significant risk factors, patient 's preference.  - Will send note to to PC provider for review.     Patient Active Problem List   Diagnosis    Paroxysmal atrial fibrillation, ablations X 3 1995, 1997, 9/2017, CHADS-VAS score 5, HAS-BLED score 5     Hypertension, 1985    Hyperlipidemia, baseline     Glaucoma (increased eye pressure)    Fibroid uterus    Thickened endometrium    Abnormal CT scan, chest    Interstitial lung disease    Pulmonary hypertension, without RV dysfunction    H/O: CVA (cerebrovascular accident), June 2014    LVH (left ventricular hypertrophy) due to hypertensive disease    Depression    Sedentary lifestyle, 6/2014    AVILA (dyspnea on exertion)    OA (osteoarthritis)    Orthostatic hypotension    Chronic diastolic heart failure, 2014    Elevated brain natriuretic peptide (BNP) level, range 98 - 1045    Microalbuminuria    Hypoalbuminemia due to protein-calorie malnutrition    TIA (transient ischemic attack), 11/3/2014    S/P ablation of atrial flutter    JOSE (generalized anxiety disorder)    Other spondylosis, lumbar region    Cervical spondylosis    Medication intolerance    Anticoagulant long-term use    GERD (gastroesophageal reflux disease)    Mild intermittent asthma without complication    Elevated troponin    BMI less than 19,adult    Localized hives    Elevated LFTs    Iron deficiency anemia due to chronic blood loss    Recurrent major depressive disorder, in partial remission    Gastroenteritis    Other spondylosis, cervical region    History of DVT (deep vein thrombosis)    Chronic sinus complaints    Second degree AV block, Mobitz type I    Anemia    Syncope    Protein calorie malnutrition    At risk for cardiovascular event    Atrial fibrillation    Long term  (current) use of anticoagulants    Atrial arrhythmia    Cardiac pacemaker in situ, [employer], dual chamber, 10/14/2021    Cognitive impairment    Frail elderly    Dehydration, moderate    Hyphema, right eye    Vitreous hemorrhage, right eye    Primary open angle glaucoma (POAG) of both eyes, indeterminate stage    Pseudophakia of both eyes    Chronic fatigue    Anorexia nervosa     Greater than 50% was spent in counseling and coordination of care. The above assessment and plan have been discussed at length. Labs and procedure over the last 6 months reviewed. Problem List updated. Asked to bring in all active medications / pills bottles with next visit.

## 2022-12-19 ENCOUNTER — PATIENT MESSAGE (OUTPATIENT)
Dept: PSYCHIATRY | Facility: CLINIC | Age: 77
End: 2022-12-19
Payer: MEDICARE

## 2022-12-29 ENCOUNTER — TELEPHONE (OUTPATIENT)
Dept: DERMATOLOGY | Facility: CLINIC | Age: 77
End: 2022-12-29
Payer: MEDICARE

## 2022-12-30 ENCOUNTER — PES CALL (OUTPATIENT)
Dept: ADMINISTRATIVE | Facility: CLINIC | Age: 77
End: 2022-12-30
Payer: MEDICARE

## 2022-12-31 ENCOUNTER — PATIENT MESSAGE (OUTPATIENT)
Dept: PSYCHIATRY | Facility: CLINIC | Age: 77
End: 2022-12-31
Payer: MEDICARE

## 2023-01-03 RX ORDER — AMITRIPTYLINE HYDROCHLORIDE 25 MG/1
37.5 TABLET, FILM COATED ORAL NIGHTLY
Qty: 45 TABLET | Refills: 1 | Status: SHIPPED | OUTPATIENT
Start: 2023-01-03 | End: 2023-01-30

## 2023-01-07 ENCOUNTER — CLINICAL SUPPORT (OUTPATIENT)
Dept: CARDIOLOGY | Facility: HOSPITAL | Age: 78
End: 2023-01-07
Payer: MEDICARE

## 2023-01-07 DIAGNOSIS — Z95.0 PRESENCE OF CARDIAC PACEMAKER: ICD-10-CM

## 2023-01-07 DIAGNOSIS — I48.91 UNSPECIFIED ATRIAL FIBRILLATION: ICD-10-CM

## 2023-01-07 PROCEDURE — 93296 REM INTERROG EVL PM/IDS: CPT | Performed by: INTERNAL MEDICINE

## 2023-01-07 PROCEDURE — 93294 CARDIAC DEVICE CHECK - REMOTE: ICD-10-PCS | Mod: ,,, | Performed by: INTERNAL MEDICINE

## 2023-01-07 PROCEDURE — 93294 REM INTERROG EVL PM/LDLS PM: CPT | Mod: ,,, | Performed by: INTERNAL MEDICINE

## 2023-01-10 ENCOUNTER — TELEPHONE (OUTPATIENT)
Dept: DERMATOLOGY | Facility: CLINIC | Age: 78
End: 2023-01-10
Payer: MEDICARE

## 2023-01-11 ENCOUNTER — OFFICE VISIT (OUTPATIENT)
Dept: PSYCHIATRY | Facility: CLINIC | Age: 78
End: 2023-01-11
Payer: MEDICARE

## 2023-01-11 VITALS
HEIGHT: 67 IN | WEIGHT: 102.5 LBS | SYSTOLIC BLOOD PRESSURE: 120 MMHG | DIASTOLIC BLOOD PRESSURE: 74 MMHG | HEART RATE: 60 BPM | BODY MASS INDEX: 16.09 KG/M2

## 2023-01-11 DIAGNOSIS — F41.1 GENERALIZED ANXIETY DISORDER: ICD-10-CM

## 2023-01-11 DIAGNOSIS — F41.0 PANIC DISORDER: ICD-10-CM

## 2023-01-11 DIAGNOSIS — F40.298 SPECIFIC PHOBIA: ICD-10-CM

## 2023-01-11 DIAGNOSIS — F33.1 MAJOR DEPRESSIVE DISORDER, RECURRENT, MODERATE: Primary | ICD-10-CM

## 2023-01-11 PROCEDURE — 1126F PR PAIN SEVERITY QUANTIFIED, NO PAIN PRESENT: ICD-10-PCS | Mod: CPTII,S$GLB,, | Performed by: PSYCHOLOGIST

## 2023-01-11 PROCEDURE — 99999 PR PBB SHADOW E&M-EST. PATIENT-LVL III: ICD-10-PCS | Mod: PBBFAC,,, | Performed by: PSYCHOLOGIST

## 2023-01-11 PROCEDURE — 1159F MED LIST DOCD IN RCRD: CPT | Mod: CPTII,S$GLB,, | Performed by: PSYCHOLOGIST

## 2023-01-11 PROCEDURE — 99999 PR PBB SHADOW E&M-EST. PATIENT-LVL III: CPT | Mod: PBBFAC,,, | Performed by: PSYCHOLOGIST

## 2023-01-11 PROCEDURE — 3078F PR MOST RECENT DIASTOLIC BLOOD PRESSURE < 80 MM HG: ICD-10-PCS | Mod: CPTII,S$GLB,, | Performed by: PSYCHOLOGIST

## 2023-01-11 PROCEDURE — 90833 PR PSYCHOTHERAPY W/PATIENT W/E&M, 30 MIN (ADD ON): ICD-10-PCS | Mod: S$GLB,,, | Performed by: PSYCHOLOGIST

## 2023-01-11 PROCEDURE — 90833 PSYTX W PT W E/M 30 MIN: CPT | Mod: S$GLB,,, | Performed by: PSYCHOLOGIST

## 2023-01-11 PROCEDURE — 1101F PR PT FALLS ASSESS DOC 0-1 FALLS W/OUT INJ PAST YR: ICD-10-PCS | Mod: CPTII,S$GLB,, | Performed by: PSYCHOLOGIST

## 2023-01-11 PROCEDURE — 99214 OFFICE O/P EST MOD 30 MIN: CPT | Mod: S$GLB,,, | Performed by: PSYCHOLOGIST

## 2023-01-11 PROCEDURE — 3074F PR MOST RECENT SYSTOLIC BLOOD PRESSURE < 130 MM HG: ICD-10-PCS | Mod: CPTII,S$GLB,, | Performed by: PSYCHOLOGIST

## 2023-01-11 PROCEDURE — 3078F DIAST BP <80 MM HG: CPT | Mod: CPTII,S$GLB,, | Performed by: PSYCHOLOGIST

## 2023-01-11 PROCEDURE — 3288F FALL RISK ASSESSMENT DOCD: CPT | Mod: CPTII,S$GLB,, | Performed by: PSYCHOLOGIST

## 2023-01-11 PROCEDURE — 1126F AMNT PAIN NOTED NONE PRSNT: CPT | Mod: CPTII,S$GLB,, | Performed by: PSYCHOLOGIST

## 2023-01-11 PROCEDURE — 1101F PT FALLS ASSESS-DOCD LE1/YR: CPT | Mod: CPTII,S$GLB,, | Performed by: PSYCHOLOGIST

## 2023-01-11 PROCEDURE — 1157F ADVNC CARE PLAN IN RCRD: CPT | Mod: CPTII,S$GLB,, | Performed by: PSYCHOLOGIST

## 2023-01-11 PROCEDURE — 3288F PR FALLS RISK ASSESSMENT DOCUMENTED: ICD-10-PCS | Mod: CPTII,S$GLB,, | Performed by: PSYCHOLOGIST

## 2023-01-11 PROCEDURE — 1157F PR ADVANCE CARE PLAN OR EQUIV PRESENT IN MEDICAL RECORD: ICD-10-PCS | Mod: CPTII,S$GLB,, | Performed by: PSYCHOLOGIST

## 2023-01-11 PROCEDURE — 3074F SYST BP LT 130 MM HG: CPT | Mod: CPTII,S$GLB,, | Performed by: PSYCHOLOGIST

## 2023-01-11 PROCEDURE — 99214 PR OFFICE/OUTPT VISIT, EST, LEVL IV, 30-39 MIN: ICD-10-PCS | Mod: S$GLB,,, | Performed by: PSYCHOLOGIST

## 2023-01-11 PROCEDURE — 1159F PR MEDICATION LIST DOCUMENTED IN MEDICAL RECORD: ICD-10-PCS | Mod: CPTII,S$GLB,, | Performed by: PSYCHOLOGIST

## 2023-01-11 RX ORDER — BUSPIRONE HYDROCHLORIDE 10 MG/1
10 TABLET ORAL 3 TIMES DAILY
Qty: 90 TABLET | Refills: 1 | Status: SHIPPED | OUTPATIENT
Start: 2023-01-11 | End: 2023-01-13

## 2023-01-11 RX ORDER — VORTIOXETINE 10 MG/1
1 TABLET, FILM COATED ORAL NIGHTLY
Qty: 90 TABLET | Refills: 1 | Status: SHIPPED | OUTPATIENT
Start: 2023-01-11 | End: 2023-03-22 | Stop reason: SDUPTHER

## 2023-01-11 NOTE — PROGRESS NOTES
Outpatient Psychiatry Follow-Up Visit    Clinical Status of Patient: Outpatient (Ambulatory)  01/11/2023     Chief Complaint: 76 y/o female presenting today with  for a follow-up.       Interval History and Content of Current Session:  Interim Events/Subjective Report/Content of Current Session: 76 y/o female follow-up appointment.    Pt is a 76 y/o female with past psychiatric hx of depression, anxiety, eating disorder who presents for follow-up treatment. Pt denied having any difficulty with aripiprazole titration plan and was able to D/C. Pt noted improved mood, alertness, and energy afterward. Pt's  reported that she has been more verbally communicative and smiling more frequently. Pt's  noted that she has been able to complete more tasks around the home (e.g., washing dishes). Pt's  stated that she has also displayed less postural hypotension. Pt has been able to maintain weight gain and continues to be above 100 pounds for one month. Pt is drinking a 500 calorie shake at lunch and working with therapist to increase food intake. Pt and  have plans to travel in a couple of months. Pt has been unable to do so for 1.5 years and had to cancel the plans last year due to mood/energy concerns.     Past Psychiatric hx: remeron, rexulti, vraylar, Limbitrol, venlafaxine, fluoxetine, gabapentin, cannot remember others.     Past Medical hx:   Past Medical History:   Diagnosis Date    Anticoagulant long-term use     Anxiety     Arthritis     Atrial fibrillation     Cancer     skin    CHF (congestive heart failure)     Depression     DVT (deep venous thrombosis)     GERD (gastroesophageal reflux disease)     Glaucoma (increased eye pressure)     Hyperlipidemia     diet controlled    Hypertension     Interstitial lung disease     Localized hives 01/10/2020    Mild persistent asthma without complication 11/12/2018    Pacemaker     Pneumonia 01/31/2014    Stroke 06/03/2014    Stroke     TIA  (transient ischemic attack)     TIA (transient ischemic attack)         Interim hx:  Medication changes last visit: Titrate and D/C aripiprazole 20 mg  Anxiety: decrease  Depression: decrease     Denies suicidal/homicidal ideations.  Denies hopelessness/worthlessness.    Denies auditory/visual hallucinations      Alcohol: Infrequent use  Drug: Pt denied  Caffeine: Not assessed  Tobacco: Pt denied      Review of Systems   PSYCHIATRIC: Pertinent items are noted in the narrative.        CONSTITUTIONAL: weight stable    Past Medical, Family and Social History: The patient's past medical, family and social history have been reviewed and updated as appropriate within the electronic medical record. See encounter notes.     Current Psychiatric Medication:  ativan 0.5 mg qd, buspirone 10 mg TID, trintillex 10 mg, amitriptyline 37.5 mg (ingredient in lumitrol) (recently increased)     Compliance: yes      Side effects: Pt denied     Risk Parameters:  Patient reports no suicidal ideation  Patient reports no homicidal ideation  Patient reports no self-injurious behavior  Patient reports no violent behavior     Exam (detailed: at least 9 elements; comprehensive: all 15 elements)   Constitutional  Vitals:  Most recent vital signs, dated less than 90 days prior to this appointment, were reviewed.        General:  unremarkable, age appropriate, casual attire, good eye contact, good rapport       Musculoskeletal  Muscle Strength/Tone:  no flaccidity, no tremor    Gait & Station:  normal      Psychiatric                       Speech:  normal tone, normal rate, rhythm, and volume   Mood & Affect:   Depressed, anxious         Thought Process:   Goal directed; Linear    Associations:   intact   Thought Content:   No SI/HI, delusions, or paranoia, no AV/VH   Insight & Judgement:   Good, adequate to circumstances   Orientation:   grossly intact; alert and oriented x 4    Memory:  intact for content of interview    Language:  grossly  intact, can repeat    Attention Span  : Grossly intact for content of interview   Fund of Knowledge:   intact and appropriate to age and level of education        Assessment and Diagnosis   Status/Progress: Based on the examination today, the patient's problem(s) is/are adequately controlled.  New problems have not been presented today. Co-morbidities are not complicating management of the primary condition. There are no active rule-out diagnoses for this patient at this time.      Impression: Pt continues to appear more verbally and nonverbally responsive after D/C of aripiprazole. Able to maintain weight above 100 pounds. Need ot coordinate services with therapist, Graeme Meyer LCSW, and made several attempts to contact without success. Continue to emphasize caloric intake and discussed solutions to increase calories. Will continue with medication plans as is and continue to consider low dose quetiapine or mirtazapine in the future.     Diagnosis:   1) Major Depressive Disorder, recurrent, moderate  2) Generalized Anxiety Disorder  3) Specific Phobia  4) Panic Disorder   5) Personality Disorder traits  6) R/o Dementia of unknown etiology  Intervention/Counseling/Treatment Plan   Medication Management:      1. Titrate and D/C aripiprazole 20 mg    2. ativan 0.5 mg qd PRN    3. buspirone 10 mg TID    4. trintillex 10 mg    5. Amitriptyline 37.5 mg     6. Call to report any worsening of symptoms or problems with the medication. Pt instructed to go to ER with thoughts of harming self, others     7. Cont in therapy with Graeme Meyer LCSW    Psychotherapy: 20 minutes   Target symptoms: anxiety, weight loss  Why chosen therapy is appropriate versus another modality: CBT used; relevant to diagnosis, patient responds to this modality  Outcome monitoring methods: self-report, observation  Therapeutic intervention type: Cognitive Behavioral Therapy  Topics discussed/themes: building skills sets for symptom management,  symptom recognition, nutrition, exercise  The patient's response to the intervention is accepting  Patient's response to treatment is: good.   The patient's progress toward treatment goals: limited to fair     Return to clinic: 1 month    -Cognitive-Behavioral/Supportive therapy and psychoeducation provided  -R/B/SE's of medications discussed with the pt who expresses understanding and chooses to take medications as prescribed.   -Pt instructed to call clinic, 911 or go to nearest emergency room if sxs worsen or pt is in   crisis. The pt expresses understanding.    Galo Lpiscomb, PhD, MP     Antidepressant/Antianxiety Medication Initiation:  Patient informed of risks, benefits, and potential side effects of medication and accepts informed consent.  Common side effects include nausea, fatigue, headache, insomnia., Specifically discussed the possibility of new or worsening suicidal thoughts/depression.  Patient instructed to stop the medication immediately and seek urgent treatment if this occurs. Patient instructed not to abruptly discontinue medication without physician guidance except in cases of sudden onset or worsening of SI.       Stimulant Medication Initiation:  Patient advised of risks, benefits, and side effects of medication and accepts informed consent.  Common side effects include insomnia, irritability, jittery feeling, dry mouth, and agitation/hostility., Patient advised of potential addictive nature of medication and controlled substance classification.  Instructed to safeguard medication as no early refills can be given for lost or stolen medications.       Benzodiazepine Initiation:  Patient advised of the risks, benefits, and common side effects of medication and has accepted informed consent.  Common side effects include drowsiness, impaired coordination, possible memory loss., Patient advised NOT to operate a vehicle or machinery untiil they are sure how the medication will affec them.  Client  also advised of danger of mixing this medication with alcohol., Patient advised of potential addictive nature of medication and need to safeguard medication as no early refills for lost or stolen medications can be authorized.       Pregnancy Warning:  Patient denies current pregnancy possibility.  Patient made aware that medications have not been proven safe in pregnancy and that she must maintain adequate birth control.  Patient instructed to alert us immediately if she becomes pregnant.       Sleep Aid Initiation:  Patient advised of potential side effects of medication including sleep walking or other complex behaviors.  Patient advised not to mix medication with alcohol, to go to bed immediately after taking, and to stop at first sign of any unusual behaviors.

## 2023-01-12 ENCOUNTER — PATIENT MESSAGE (OUTPATIENT)
Dept: FAMILY MEDICINE | Facility: CLINIC | Age: 78
End: 2023-01-12
Payer: MEDICARE

## 2023-01-24 NOTE — PROGRESS NOTES
On ASPIRIN On ELIQUIS   Prior photo(s) of site(s) to confirm location(s):       Defibrillator: No  Pacemaker: Yes  Artificial heart valves: No  Artificial joints: No    ALLERGIES:   Gabapentin, Xarelto [rivaroxaban], Bactrim [sulfamethoxazole-trimethoprim], Afrin (pseudoephedrine), Amoxicillin-pot clavulanate, Atorvastatin, Baclofen, Baclofen (bulk), Ciprofloxacin, Ciprofloxacin (bulk), Decongest tabs, Decongestant [pseudoephedrine hcl], Erythromycin, Flecainide, Fluoxetine, Lisinopril, Losartan, Morphine, Tramadol, Venlafaxine, Venlafaxine analogues, Afrin [oxymetazoline], Caffeine, Dabigatran etexilate, and Tizanidine      Current Outpatient Medications:     amitriptyline (ELAVIL) 25 MG tablet, Take 1.5 tablets (37.5 mg total) by mouth nightly., Disp: 45 tablet, Rfl: 1    apixaban (ELIQUIS) 5 mg Tab, Take 1 tablet (5 mg total) by mouth 2 (two) times daily., Disp: 60 tablet, Rfl: 11    aspirin 81 MG Chew, TAKE 1 TABLET BY MOUTH EVERY DAY, Disp: 90 tablet, Rfl: 4    ATIVAN 0.5 mg tablet, Take 1 tablet (0.5 mg total) by mouth 2 (two) times daily as needed for Anxiety., Disp: 60 tablet, Rfl: 0    atropine 1% (ISOPTO ATROPINE) 1 % Drop, Place 1 drop into the right eye 2 (two) times a day., Disp: 5 mL, Rfl: 6    busPIRone (BUSPAR) 10 MG tablet, TAKE 1 TABLET BY MOUTH THREE TIMES A DAY, Disp: 90 tablet, Rfl: 1    dorzolamide-timolol 2-0.5% (COSOPT) 22.3-6.8 mg/mL ophthalmic solution, Place into both eyes., Disp: , Rfl:     metoprolol tartrate (LOPRESSOR) 25 MG tablet, TAKE 1 TABLET BY MOUTH TWICE A DAY, Disp: 180 tablet, Rfl: 3    pantoprazole (PROTONIX) 40 MG tablet, Take 1 tablet (40 mg total) by mouth once daily., Disp: 90 tablet, Rfl: 3    rosuvastatin (CRESTOR) 40 MG Tab, Take 1 tablet (40 mg total) by mouth every evening., Disp: 90 tablet, Rfl: 3    TRINTELLIX 10 mg Tab, Take 1 tablet (10 mg total) by mouth nightly., Disp: 90 tablet, Rfl: 1  No current facility-administered medications for this  visit.    Facility-Administered Medications Ordered in Other Visits:     bupivacaine (PF) 0.25% (2.5 mg/ml) injection, , , PRN, Galo Clancy MD, 6 mL at 02/19/19 1314    lidocaine (PF) 10 mg/ml (1%) injection, , , PRN, Galo Clancy MD, 9 mL at 02/19/19 1304    lidocaine (PF) 20 mg/ml (2%) injection, , , PRN, Galo Clancy MD, 6 mL at 02/19/19 1310    methylPREDNISolone acetate injection, , , PRN, Galo Clancy MD, 80 mg at 02/19/19 1314  -------------------------------------------------------------  PROCEDURE: Mohs' Micrographic Surgery    SITE: left upper lip     INDICATION: basal cell carcinoma in an area at increased risk of recurrence, aggressive pathology (nodular/infiltrative basal cell carcinoma)    CASE NUMBER: [id number]      ANESTHETIC: 1 mL 2% xylocaine with epinephrine, 1:100,000     SURGICAL PREP: Ethanol and Hibiclens    SURGEON: Carrillo Collier MD    ASSISTANTS: Frank Garnica CST     PREOPERATIVE DIAGNOSIS: basal cell carcinoma    POSTOPERATIVE DIAGNOSIS: basal cell carcinoma    PATHOLOGIC DIAGNOSIS: nodular/infiltrative basal cell carcinoma     STAGES OF MOHS' SURGERY PERFORMED: one    TUMOR-FREE PLANE ACHIEVED: yes    HEMOSTASIS: Hyfrecation     SPECIMENS: one (one in stage A)    INITIAL LESION SIZE: 0.8 x 1.0 cm    FINAL DEFECT SIZE: 0.9 x 1.1 cm    WOUND REPAIR/DISPOSITION: see below    NARRATIVE:    The patient is a 77 y.o.female referred by Allie Jarrell MD with a history of cancer on the left upper lip which was biopsied - pathology accession # [id number]-, [facility] . Findings revealed nodular/infiltrative basal cell carcinoma. Examination revealed a pink, depressed scar on the left mid upper lip at the site of prior biopsy, which was confirmed by reference to the photograph taken at the previous patient visit, as attached above. In light of the aggressive pathologic nature of this tumor and the location on the lip, Mohs' micrographic surgery was thought to be the most appropriate  "management choice, and this diagnosis is appropriate for treatment by Mohs' micrographic surgery.  I discussed it with the patient and she fully understands the aims, risks, alternatives, and possible complications, and elects to proceed.  There are no medical or surgical contraindications to the procedure.     A signed informed consent was obtained.    PROCEDURE:  The patient was placed in the semi-recumbent position on the operating table in the Mohs' Surgery Suite. The area in question was thoroughly prepped with ethanol and  Hibiclens, with particular care to avoid application to the immediate periocular skin. A sterile surgical marker was used to outline the clinically apparent margins of the involved area, and a narrow margin of normal-appearing skin. Reference marks were made at the periphery of the outlined area with the surgical marker. The proposed area of excision was measured and photographed. Local anesthesia as noted above was administered.  The total volume of anesthetic used throughout this portion of the procedure was as documented above. The area was prepared and draped in the standard manner. All of the grossly identifiable area of clinically abnormal tissue and an underlying/peripheral layer was taken and processed by the Mohs' technique.  Hemostasis was obtained with the hyfrecator. Tissue was taken from any areas of residual marginal involvement (if present) and processed by the Mohs' technique in as many stages as needed until a tumor-free plane was achieved.    Colors of inks used in the reference nicks at epidermal margins (if present) and/or inking of non-epithelial edges, if applicable, is represented on the Mohs map as follows: solid lines represent red ink, dots represent blue ink, jagged lines represent black ink, curlicues represent green ink, "xxx" represents yellow ink.    The first Mohs' layer consisted of one section(s) with 3 slide(s) evaluated. No residual tumor was noted at the " margins of the first Mohs' layer. Histology of the specimen(s) showed changes consistent with chronic solar damage.    A total of one section(s) and 3 slide(s) were examined under the microscope via the Mohs technique.  A cancer free plane was reached after layer number one. Defect final size was as noted above.      The wound was covered with a nonadherent dressing between stages, and the patient allowed to wait in the waiting area during these periods. The final defect was photographed at the completion of the Mohs' procedure.    Dr. Collier's cell phone number was given to the patient with instructions to call prn with any problems.    See the separate procedure note which follows regarding repair of the defect following Mohs' surgery.       -----------------------------------------------    REPAIR FOLLOWING MOHS' MICROGRAPHIC SURGERY    PREOPERATIVE DIAGNOSIS: defect following Mohs' surgery for a basal cell carcinoma    POSTOPERATIVE DIAGNOSIS: same    PROCEDURE PERFORMED: lip wedge and primary closure    ANESTHETIC: 2 mL 2% xylocaine with epinephrine, 1:100,000     SURGICAL PREP: Hibiclens    SURGEON: Carrillo Collier MD     ASSISTANTS: as above    LOCATION: left upper lip       INDICATIONS:  Earlier in the day, the patient underwent Mohs' micrographic surgical excision of a basal cell carcinoma on the left upper lip. Tumor free margins were achieved after layer number one.  Later in the day, the management of the resulting wound was addressed with the patient. I discussed the various wound management options with the patient and she fully understands the aims, risks, alternatives, and possible complications of the alternatives, and she elects to proceed with closure of the defect in the manner noted below.  There are no medical or surgical contraindications to the procedure.    A signed informed consent was previously obtained.    PROCEDURE:  Repair via full-thickness wedge excision and primary direct  closure:  The patient was returned to the procedure room following completion of the Mohs' procedure and final slide review. Because of the size, shape and location of the defect, simple closure could not be achieved without possible distortion of the surrounding lip, excessive tension on the wound margins and an unacceptable risk of wound dehiscence, and the creation of standing cone deformities.     Consideration was given the the site of the wound on the lip, the surrounding structures, and the orientation of closure necessary to provide the optimal functional and cosmetic outcome. After devoting time to these considerations,  the area was prepped again and a full-thickness lip wedge excision and repair was outlined on the skin and mucosa around the defect with a sterile surgical marker. The vermilion border was marked to facilitate reapproximation without displacement during the closure. Additional anesthetic was infiltrated into the tissues surrounding the defect and the anticipated area of repair, to maintain anesthesia during the procedure. Preparation of the site for closure was then carried out by extending the defect through excision of a triangular full-thickness wedge of lip tissue around and underlying the wound, to square the shoulders of the defect and to allow closure without distortion of the area. Hemostasis was achieved with the hyfrecator.  Next, closure of the defect was carried out in layers. The submucosa was closed with 2 #5-0 Vicryl buried sutures. The mucosa was closed with three #5-0 Vicryl simple interrupted sutures. The orbicularis oris muscle was re-approximated with several #5-0 Vicryl buried sutures.  The dermis and epidermis was closed with multiple #5-0 Prolene simple interrupted and vertical mattress sutures.     The site was photographed following completion of the repair. Final dressing consisted of petrolatum, Telfa and tape.    Estimated blood loss for the total procedure was  less than 10 mL.    Total operative time including tissue processing in the Mohs' laboratory and microscopic Mohs' frozen section slide review was 2 hour(s). Verbal and written wound care instructions were given to the patient, and she expressed understanding of these instructions. The patient tolerated the procedure well and left the operating room in good condition; she is to return in 7 days for suture removal.     Dr. Collier's cell phone number was given to the patient with instructions to call prn with any problems.

## 2023-01-26 ENCOUNTER — PROCEDURE VISIT (OUTPATIENT)
Dept: DERMATOLOGY | Facility: CLINIC | Age: 78
End: 2023-01-26
Payer: MEDICARE

## 2023-01-26 VITALS
HEART RATE: 68 BPM | HEIGHT: 67 IN | WEIGHT: 105 LBS | DIASTOLIC BLOOD PRESSURE: 73 MMHG | BODY MASS INDEX: 16.48 KG/M2 | SYSTOLIC BLOOD PRESSURE: 107 MMHG

## 2023-01-26 DIAGNOSIS — C44.01 BASAL CELL CARCINOMA, LIP: Primary | ICD-10-CM

## 2023-01-26 PROCEDURE — 17311: ICD-10-PCS | Mod: 59,S$GLB,, | Performed by: DERMATOLOGY

## 2023-01-26 PROCEDURE — 40510 PR PARTIAL EXCIS LIP,WEDGE PRIM CLOS: ICD-10-PCS | Mod: S$GLB,,, | Performed by: DERMATOLOGY

## 2023-01-26 PROCEDURE — 40510 PARTIAL EXCISION OF LIP: CPT | Mod: S$GLB,,, | Performed by: DERMATOLOGY

## 2023-01-26 PROCEDURE — 99499 NO LOS: ICD-10-PCS | Mod: S$GLB,,, | Performed by: DERMATOLOGY

## 2023-01-26 PROCEDURE — 99499 UNLISTED E&M SERVICE: CPT | Mod: S$GLB,,, | Performed by: DERMATOLOGY

## 2023-01-26 PROCEDURE — 17311 MOHS 1 STAGE H/N/HF/G: CPT | Mod: 59,S$GLB,, | Performed by: DERMATOLOGY

## 2023-02-01 ENCOUNTER — OFFICE VISIT (OUTPATIENT)
Dept: HOME HEALTH SERVICES | Facility: CLINIC | Age: 78
End: 2023-02-01
Payer: MEDICARE

## 2023-02-01 VITALS
BODY MASS INDEX: 15.66 KG/M2 | SYSTOLIC BLOOD PRESSURE: 91 MMHG | DIASTOLIC BLOOD PRESSURE: 51 MMHG | OXYGEN SATURATION: 96 % | WEIGHT: 100 LBS

## 2023-02-01 DIAGNOSIS — I50.32 CHRONIC DIASTOLIC HEART FAILURE: ICD-10-CM

## 2023-02-01 DIAGNOSIS — F33.1 MODERATE EPISODE OF RECURRENT MAJOR DEPRESSIVE DISORDER: ICD-10-CM

## 2023-02-01 DIAGNOSIS — R63.6 UNDERWEIGHT: ICD-10-CM

## 2023-02-01 DIAGNOSIS — Z95.0 CARDIAC PACEMAKER IN SITU: ICD-10-CM

## 2023-02-01 DIAGNOSIS — I27.20 PULMONARY HYPERTENSION: ICD-10-CM

## 2023-02-01 DIAGNOSIS — Z00.00 ENCOUNTER FOR PREVENTIVE HEALTH EXAMINATION: Primary | ICD-10-CM

## 2023-02-01 DIAGNOSIS — I48.0 PAROXYSMAL ATRIAL FIBRILLATION: ICD-10-CM

## 2023-02-01 DIAGNOSIS — J84.9 INTERSTITIAL LUNG DISEASE: ICD-10-CM

## 2023-02-01 DIAGNOSIS — F41.1 GAD (GENERALIZED ANXIETY DISORDER): ICD-10-CM

## 2023-02-01 DIAGNOSIS — K21.9 GASTROESOPHAGEAL REFLUX DISEASE WITHOUT ESOPHAGITIS: ICD-10-CM

## 2023-02-01 PROBLEM — F33.9 RECURRENT MAJOR DEPRESSIVE DISORDER: Status: ACTIVE | Noted: 2020-01-23

## 2023-02-01 PROCEDURE — 1159F PR MEDICATION LIST DOCUMENTED IN MEDICAL RECORD: ICD-10-PCS | Mod: CPTII,S$GLB,, | Performed by: NURSE PRACTITIONER

## 2023-02-01 PROCEDURE — 1170F FXNL STATUS ASSESSED: CPT | Mod: CPTII,S$GLB,, | Performed by: NURSE PRACTITIONER

## 2023-02-01 PROCEDURE — 3078F DIAST BP <80 MM HG: CPT | Mod: CPTII,S$GLB,, | Performed by: NURSE PRACTITIONER

## 2023-02-01 PROCEDURE — 1160F PR REVIEW ALL MEDS BY PRESCRIBER/CLIN PHARMACIST DOCUMENTED: ICD-10-PCS | Mod: CPTII,S$GLB,, | Performed by: NURSE PRACTITIONER

## 2023-02-01 PROCEDURE — 1157F ADVNC CARE PLAN IN RCRD: CPT | Mod: CPTII,S$GLB,, | Performed by: NURSE PRACTITIONER

## 2023-02-01 PROCEDURE — 1101F PR PT FALLS ASSESS DOC 0-1 FALLS W/OUT INJ PAST YR: ICD-10-PCS | Mod: CPTII,S$GLB,, | Performed by: NURSE PRACTITIONER

## 2023-02-01 PROCEDURE — 1101F PT FALLS ASSESS-DOCD LE1/YR: CPT | Mod: CPTII,S$GLB,, | Performed by: NURSE PRACTITIONER

## 2023-02-01 PROCEDURE — 3074F PR MOST RECENT SYSTOLIC BLOOD PRESSURE < 130 MM HG: ICD-10-PCS | Mod: CPTII,S$GLB,, | Performed by: NURSE PRACTITIONER

## 2023-02-01 PROCEDURE — 1160F RVW MEDS BY RX/DR IN RCRD: CPT | Mod: CPTII,S$GLB,, | Performed by: NURSE PRACTITIONER

## 2023-02-01 PROCEDURE — 1170F PR FUNCTIONAL STATUS ASSESSED: ICD-10-PCS | Mod: CPTII,S$GLB,, | Performed by: NURSE PRACTITIONER

## 2023-02-01 PROCEDURE — 1157F PR ADVANCE CARE PLAN OR EQUIV PRESENT IN MEDICAL RECORD: ICD-10-PCS | Mod: CPTII,S$GLB,, | Performed by: NURSE PRACTITIONER

## 2023-02-01 PROCEDURE — 1159F MED LIST DOCD IN RCRD: CPT | Mod: CPTII,S$GLB,, | Performed by: NURSE PRACTITIONER

## 2023-02-01 PROCEDURE — 3074F SYST BP LT 130 MM HG: CPT | Mod: CPTII,S$GLB,, | Performed by: NURSE PRACTITIONER

## 2023-02-01 PROCEDURE — 3288F FALL RISK ASSESSMENT DOCD: CPT | Mod: CPTII,S$GLB,, | Performed by: NURSE PRACTITIONER

## 2023-02-01 PROCEDURE — 3078F PR MOST RECENT DIASTOLIC BLOOD PRESSURE < 80 MM HG: ICD-10-PCS | Mod: CPTII,S$GLB,, | Performed by: NURSE PRACTITIONER

## 2023-02-01 PROCEDURE — 3288F PR FALLS RISK ASSESSMENT DOCUMENTED: ICD-10-PCS | Mod: CPTII,S$GLB,, | Performed by: NURSE PRACTITIONER

## 2023-02-01 PROCEDURE — G0439 PR MEDICARE ANNUAL WELLNESS SUBSEQUENT VISIT: ICD-10-PCS | Mod: S$GLB,,, | Performed by: NURSE PRACTITIONER

## 2023-02-01 PROCEDURE — G0439 PPPS, SUBSEQ VISIT: HCPCS | Mod: S$GLB,,, | Performed by: NURSE PRACTITIONER

## 2023-02-01 NOTE — PATIENT INSTRUCTIONS
Counseling and Referral of Other Preventative  (Italic type indicates deductible and co-insurance are waived)    Patient Name: Ayla Dela Cruz  Today's Date: 2/1/2023    Health Maintenance       Date Due Completion Date    COVID-19 Vaccine (4 - Booster for Moderna series) 02/21/2022 12/27/2021    Colonoscopy 10/10/2022 10/10/2019    Override on 3/12/2003: Done (In [facility] [place], [place] )    Lipid Panel 02/17/2023 2/17/2022    Hemoglobin A1c (Prediabetes) 08/04/2023 8/4/2022    DEXA Scan 02/17/2025 2/17/2022    Override on 11/12/2014: Declined    TETANUS VACCINE 08/30/2030 8/30/2020        No orders of the defined types were placed in this encounter.    The following information is provided to all patients.  This information is to help you find resources for any of the problems found today that may be affecting your health:                Living healthy guide: www.[facility].[place].[facility]      Understanding Diabetes: www.diabetes.org      Eating healthy: www.cdc.gov/healthyweight      CDC home safety checklist: www.cdc.gov/steadi/patient.html      Agency on Aging: www.goea.[place].[facility]      Alcoholics anonymous (AA): www.aa.org      Physical Activity: www.david.nih.gov/em7izyg      Tobacco use: www.quitwithusla.org

## 2023-02-01 NOTE — PROGRESS NOTES
Ayla Dela Cruz presented for a  Medicare AWV and comprehensive Health Risk Assessment today. The following components were reviewed and updated:    Medical history  Family History  Social history  Allergies and Current Medications  Health Risk Assessment  Health Maintenance  Care Team         ** See Completed Assessments for Annual Wellness Visit within the encounter summary.**         The following assessments were completed:  Living Situation  CAGE  Depression Screening  Timed Get Up and Go  Whisper Test  Cognitive Function Screening -not performed  Nutrition Screening  ADL Screening  PAQ Screening    Review for Opioid Screening: Patient does not have rx for Opioids.  Review for Substance Use Disorders: Patient does not use substance.      Vitals:    02/01/23 0905   BP: (!) 91/51   SpO2: 96%   Weight: 45.4 kg (100 lb)     Body mass index is 15.66 kg/m².  Physical Exam  Vitals reviewed.   Constitutional:       Appearance: She is ill-appearing.   HENT:      Head: Normocephalic.   Eyes:      Pupils: Pupils are equal, round, and reactive to light.   Cardiovascular:      Rate and Rhythm: Normal rate and regular rhythm.      Heart sounds: Normal heart sounds.      Comments: pacemaker  Pulmonary:      Effort: Pulmonary effort is normal.      Breath sounds: Normal breath sounds.   Abdominal:      General: Bowel sounds are normal.      Palpations: Abdomen is soft.   Musculoskeletal:         General: Normal range of motion.      Cervical back: Normal range of motion.      Right lower leg: No edema.      Left lower leg: No edema.   Skin:     General: Skin is warm and dry.      Comments: Dressing upper lip, bcc excised   Neurological:      Mental Status: She is alert and oriented to person, place, and time.      Gait: Gait abnormal (slow, unsteady).   Psychiatric:         Behavior: Behavior normal.         Diagnoses and health risks identified today and associated recommendations/orders:    1. Encounter for preventive  health examination  - Above assessments completed. Preventive measures and health maintenance reviewed with patient and .    2. Paroxysmal atrial fibrillation, ablations X 3 1995, 1997, 9/2017, CHADS-VAS score 5, HAS-BLED score 5   Stable, followed by Cardiology  -apixaban and metoprolol    3. Chronic diastolic heart failure, 2014  Stable, followed by Cardiology  -no acute issues    4. Cardiac pacemaker in situ, [employer], dual chamber, 10/14/2021  -followed by Cardiology    5. Pulmonary hypertension, without RV dysfunction  Stable, followed by Pulmonology    6. Interstitial lung disease  Stable, followed by Pulmonology    7. Moderate episode of recurrent major depressive disorder  Stable, followed by Psychiatry  -on amitriptylline, trintellix    8. JOSE (generalized anxiety disorder)  Stable, followed by Psychiatry  -on buspirone, ativan    9. Gastroesophageal reflux disease without esophagitis  Stable, followed by PCP  -on PPI    10. Underweight  -followed by Psychiatry for this diagnosis      Provided Ayla with a 5-10 year written screening schedule and personal prevention plan. Recommendations were developed using the USPSTF age appropriate recommendations. Education, counseling, and referrals were provided as needed. After Visit Summary printed and given to patient which includes a list of additional screenings\tests needed.    Follow up in about 1 year (around 2/1/2024) for your next annual wellness visit.    Marguerite Gerard NP    I offered to discuss advanced care planning, including how to pick a person who would make decisions for you if you were unable to make them for yourself, called a health care power of , and what kind of decisions you might make such as use of life sustaining treatments such as ventilators and tube feeding when faced with a life limiting illness recorded on a living will that they will need to know. (How you want to be cared for as you near the end of your  natural life)     X  Patient has advanced directives on file, which we reviewed, and they do not wish to make changes.

## 2023-02-02 ENCOUNTER — OFFICE VISIT (OUTPATIENT)
Dept: DERMATOLOGY | Facility: CLINIC | Age: 78
End: 2023-02-02
Payer: MEDICARE

## 2023-02-02 DIAGNOSIS — Z48.02 VISIT FOR SUTURE REMOVAL: Primary | ICD-10-CM

## 2023-02-02 PROCEDURE — 1160F PR REVIEW ALL MEDS BY PRESCRIBER/CLIN PHARMACIST DOCUMENTED: ICD-10-PCS | Mod: CPTII,S$GLB,, | Performed by: DERMATOLOGY

## 2023-02-02 PROCEDURE — 1157F PR ADVANCE CARE PLAN OR EQUIV PRESENT IN MEDICAL RECORD: ICD-10-PCS | Mod: CPTII,S$GLB,, | Performed by: DERMATOLOGY

## 2023-02-02 PROCEDURE — 99999 PR PBB SHADOW E&M-EST. PATIENT-LVL I: ICD-10-PCS | Mod: PBBFAC,,, | Performed by: DERMATOLOGY

## 2023-02-02 PROCEDURE — 1160F RVW MEDS BY RX/DR IN RCRD: CPT | Mod: CPTII,S$GLB,, | Performed by: DERMATOLOGY

## 2023-02-02 PROCEDURE — 1159F PR MEDICATION LIST DOCUMENTED IN MEDICAL RECORD: ICD-10-PCS | Mod: CPTII,S$GLB,, | Performed by: DERMATOLOGY

## 2023-02-02 PROCEDURE — 99024 POSTOP FOLLOW-UP VISIT: CPT | Mod: S$GLB,,, | Performed by: DERMATOLOGY

## 2023-02-02 PROCEDURE — 99024 PR POST-OP FOLLOW-UP VISIT: ICD-10-PCS | Mod: S$GLB,,, | Performed by: DERMATOLOGY

## 2023-02-02 PROCEDURE — 1157F ADVNC CARE PLAN IN RCRD: CPT | Mod: CPTII,S$GLB,, | Performed by: DERMATOLOGY

## 2023-02-02 PROCEDURE — 99999 PR PBB SHADOW E&M-EST. PATIENT-LVL I: CPT | Mod: PBBFAC,,, | Performed by: DERMATOLOGY

## 2023-02-02 PROCEDURE — 1159F MED LIST DOCD IN RCRD: CPT | Mod: CPTII,S$GLB,, | Performed by: DERMATOLOGY

## 2023-02-02 NOTE — PROGRESS NOTES
CC: 77 y.o.female patient is here for suture removal.     HPI: Patient is one week(s) s/p Mohs' micrographic surgery, fresh tissue technique of a basal cell carcinoma on the upper lip, with subsequent repair   Patient and  report some bruising.    EXAM:  Sutures intact.  Wound healing well.  Good approximation of skin edges.  No undue erythema to surrounding skin or signs or symptoms of infection.  Some moist eschar along the lip margin  No evidence of hematoma but notable ecchymosis bilaterally to upper lip and extending down melolabial folds to chin    IMPRESSION:  Healing well post Mohs' micrographic surgery and repair  Postop ecchymosis, likely related to eliquis use    PLAN:  Site cleaned with peroxide, sutures removed  Dressed with petrolatum, Telfa  Reviewed further care and expected course  Followup 6 weeks; call prn sooner

## 2023-02-08 ENCOUNTER — OFFICE VISIT (OUTPATIENT)
Dept: PSYCHIATRY | Facility: CLINIC | Age: 78
End: 2023-02-08
Payer: MEDICARE

## 2023-02-08 VITALS
HEART RATE: 54 BPM | DIASTOLIC BLOOD PRESSURE: 67 MMHG | BODY MASS INDEX: 15.97 KG/M2 | HEIGHT: 67 IN | SYSTOLIC BLOOD PRESSURE: 106 MMHG | WEIGHT: 101.75 LBS

## 2023-02-08 DIAGNOSIS — F41.0 PANIC DISORDER: ICD-10-CM

## 2023-02-08 DIAGNOSIS — F33.1 MAJOR DEPRESSIVE DISORDER, RECURRENT, MODERATE: Primary | ICD-10-CM

## 2023-02-08 DIAGNOSIS — F40.298 SPECIFIC PHOBIA: ICD-10-CM

## 2023-02-08 DIAGNOSIS — F41.1 GENERALIZED ANXIETY DISORDER: ICD-10-CM

## 2023-02-08 PROCEDURE — 99999 PR PBB SHADOW E&M-EST. PATIENT-LVL III: ICD-10-PCS | Mod: PBBFAC,,, | Performed by: PSYCHOLOGIST

## 2023-02-08 PROCEDURE — 3288F PR FALLS RISK ASSESSMENT DOCUMENTED: ICD-10-PCS | Mod: CPTII,S$GLB,, | Performed by: PSYCHOLOGIST

## 2023-02-08 PROCEDURE — 1126F PR PAIN SEVERITY QUANTIFIED, NO PAIN PRESENT: ICD-10-PCS | Mod: CPTII,S$GLB,, | Performed by: PSYCHOLOGIST

## 2023-02-08 PROCEDURE — 1159F PR MEDICATION LIST DOCUMENTED IN MEDICAL RECORD: ICD-10-PCS | Mod: CPTII,S$GLB,, | Performed by: PSYCHOLOGIST

## 2023-02-08 PROCEDURE — 1157F ADVNC CARE PLAN IN RCRD: CPT | Mod: CPTII,S$GLB,, | Performed by: PSYCHOLOGIST

## 2023-02-08 PROCEDURE — 3288F FALL RISK ASSESSMENT DOCD: CPT | Mod: CPTII,S$GLB,, | Performed by: PSYCHOLOGIST

## 2023-02-08 PROCEDURE — 3074F PR MOST RECENT SYSTOLIC BLOOD PRESSURE < 130 MM HG: ICD-10-PCS | Mod: CPTII,S$GLB,, | Performed by: PSYCHOLOGIST

## 2023-02-08 PROCEDURE — 99214 PR OFFICE/OUTPT VISIT, EST, LEVL IV, 30-39 MIN: ICD-10-PCS | Mod: S$GLB,,, | Performed by: PSYCHOLOGIST

## 2023-02-08 PROCEDURE — 3078F PR MOST RECENT DIASTOLIC BLOOD PRESSURE < 80 MM HG: ICD-10-PCS | Mod: CPTII,S$GLB,, | Performed by: PSYCHOLOGIST

## 2023-02-08 PROCEDURE — 1101F PT FALLS ASSESS-DOCD LE1/YR: CPT | Mod: CPTII,S$GLB,, | Performed by: PSYCHOLOGIST

## 2023-02-08 PROCEDURE — 1126F AMNT PAIN NOTED NONE PRSNT: CPT | Mod: CPTII,S$GLB,, | Performed by: PSYCHOLOGIST

## 2023-02-08 PROCEDURE — 99214 OFFICE O/P EST MOD 30 MIN: CPT | Mod: S$GLB,,, | Performed by: PSYCHOLOGIST

## 2023-02-08 PROCEDURE — 1157F PR ADVANCE CARE PLAN OR EQUIV PRESENT IN MEDICAL RECORD: ICD-10-PCS | Mod: CPTII,S$GLB,, | Performed by: PSYCHOLOGIST

## 2023-02-08 PROCEDURE — 3078F DIAST BP <80 MM HG: CPT | Mod: CPTII,S$GLB,, | Performed by: PSYCHOLOGIST

## 2023-02-08 PROCEDURE — 1159F MED LIST DOCD IN RCRD: CPT | Mod: CPTII,S$GLB,, | Performed by: PSYCHOLOGIST

## 2023-02-08 PROCEDURE — 90836 PSYTX W PT W E/M 45 MIN: CPT | Mod: S$GLB,,, | Performed by: PSYCHOLOGIST

## 2023-02-08 PROCEDURE — 1101F PR PT FALLS ASSESS DOC 0-1 FALLS W/OUT INJ PAST YR: ICD-10-PCS | Mod: CPTII,S$GLB,, | Performed by: PSYCHOLOGIST

## 2023-02-08 PROCEDURE — 99999 PR PBB SHADOW E&M-EST. PATIENT-LVL III: CPT | Mod: PBBFAC,,, | Performed by: PSYCHOLOGIST

## 2023-02-08 PROCEDURE — 90836 PR PSYCHOTHERAPY W/PATIENT W/E&M, 45 MIN (ADD ON): ICD-10-PCS | Mod: S$GLB,,, | Performed by: PSYCHOLOGIST

## 2023-02-08 PROCEDURE — 3074F SYST BP LT 130 MM HG: CPT | Mod: CPTII,S$GLB,, | Performed by: PSYCHOLOGIST

## 2023-02-08 RX ORDER — LORAZEPAM 0.5 MG/1
0.5 TABLET ORAL 2 TIMES DAILY PRN
Qty: 60 TABLET | Refills: 0 | Status: SHIPPED | OUTPATIENT
Start: 2023-02-08 | End: 2023-03-29

## 2023-02-08 NOTE — PROGRESS NOTES
Outpatient Psychiatry Follow-Up Visit    Clinical Status of Patient: Outpatient (Ambulatory)  02/08/2023     Chief Complaint: 76 y/o female presenting today with  for a follow-up.       Interval History and Content of Current Session:  Interim Events/Subjective Report/Content of Current Session: 76 y/o female follow-up appointment.    Pt is a 76 y/o female with past psychiatric hx of depression, anxiety, eating disorder who presents for follow-up treatment. Pt stated that she recently had a carcinoma on upper lip removed. Tolerated the surgery well; however, she did not some increased anxiety and fatigue afterward. Pt's mood and energy continue to improve. Has been able to increase calorie intake and fears of eating/chewing is starting to decrease. Has been able to find a Boost shake flavor that she enjoys (chocolate). We discussed continued exposure to various foods (exposure techniques). Pt noted anxiety in the morning, which has increased recently with the the medical procedure.     Past Psychiatric hx: remeron, rexulti, vraylar, Limbitrol, venlafaxine, fluoxetine, gabapentin, cannot remember others.     Past Medical hx:   Past Medical History:   Diagnosis Date    Anticoagulant long-term use     Anxiety     Arthritis     Atrial fibrillation     Cancer     skin    CHF (congestive heart failure)     Depression     DVT (deep venous thrombosis)     GERD (gastroesophageal reflux disease)     Glaucoma (increased eye pressure)     Hyperlipidemia     diet controlled    Hypertension     Interstitial lung disease     Localized hives 01/10/2020    Mild persistent asthma without complication 11/12/2018    Pacemaker     Pneumonia 01/31/2014    Stroke 06/03/2014    Stroke     TIA (transient ischemic attack)     TIA (transient ischemic attack)         Interim hx:  Medication changes last visit: None  Anxiety: decrease  Depression: decrease     Denies suicidal/homicidal ideations.  Denies hopelessness/worthlessness.     Denies auditory/visual hallucinations      Alcohol: Infrequent use  Drug: Pt denied  Caffeine: Not assessed  Tobacco: Pt denied      Review of Systems   PSYCHIATRIC: Pertinent items are noted in the narrative.        CONSTITUTIONAL: weight stable    Past Medical, Family and Social History: The patient's past medical, family and social history have been reviewed and updated as appropriate within the electronic medical record. See encounter notes.     Current Psychiatric Medication:  ativan 0.5 mg qd, buspirone 10 mg TID, trintillex 10 mg, amitriptyline 37.5 mg (ingredient in lumitrol)      Compliance: yes      Side effects: Pt denied     Risk Parameters:  Patient reports no suicidal ideation  Patient reports no homicidal ideation  Patient reports no self-injurious behavior  Patient reports no violent behavior     Exam (detailed: at least 9 elements; comprehensive: all 15 elements)   Constitutional  Vitals:  Most recent vital signs, dated less than 90 days prior to this appointment, were reviewed. Pulse:  [54]   BP: (106)/(67)       General:  unremarkable, age appropriate, casual attire, good eye contact, good rapport       Musculoskeletal  Muscle Strength/Tone:  no flaccidity, no tremor    Gait & Station:  normal      Psychiatric                       Speech:  normal tone, normal rate, rhythm, and volume   Mood & Affect:   Depressed, anxious         Thought Process:   Goal directed; Linear    Associations:   intact   Thought Content:   No SI/HI, delusions, or paranoia, no AV/VH   Insight & Judgement:   Good, adequate to circumstances   Orientation:   grossly intact; alert and oriented x 4    Memory:  intact for content of interview    Language:  grossly intact, can repeat    Attention Span  : Grossly intact for content of interview   Fund of Knowledge:   intact and appropriate to age and level of education        Assessment and Diagnosis   Status/Progress: Based on the examination today, the patient's problem(s)  is/are adequately controlled.  New problems have not been presented today. Co-morbidities are not complicating management of the primary condition. There are no active rule-out diagnoses for this patient at this time.      Impression: Pt continues to appear more verbally and nonverbally responsive after D/C of aripiprazole. Working on increasing calories and reduce fears associated with chewing/choking. Will continue with medication plans as is and continue to consider low dose quetiapine or mirtazapine in the future.     Diagnosis:   1) Major Depressive Disorder, recurrent, moderate  2) Generalized Anxiety Disorder  3) Specific Phobia  4) Panic Disorder   5) Personality Disorder traits  6) R/o Dementia of unknown etiology  Intervention/Counseling/Treatment Plan   Medication Management:      1. ativan 0.5 mg qd PRN    2. buspirone 10 mg TID    3. trintillex 10 mg    4. Amitriptyline 37.5 mg     5. Call to report any worsening of symptoms or problems with the medication. Pt instructed to go to ER with thoughts of harming self, others     6. Cont in therapy with Graeme Meyer LCSW    Psychotherapy: 20 minutes   Target symptoms: anxiety, weight loss  Why chosen therapy is appropriate versus another modality: CBT used; relevant to diagnosis, patient responds to this modality  Outcome monitoring methods: self-report, observation  Therapeutic intervention type: Cognitive Behavioral Therapy  Topics discussed/themes: building skills sets for symptom management, symptom recognition, nutrition, exercise  The patient's response to the intervention is accepting  Patient's response to treatment is: good.   The patient's progress toward treatment goals: limited to fair     Return to clinic: 1 month    -Cognitive-Behavioral/Supportive therapy and psychoeducation provided  -R/B/SE's of medications discussed with the pt who expresses understanding and chooses to take medications as prescribed.   -Pt instructed to call clinic, 911 or  go to nearest emergency room if sxs worsen or pt is in   crisis. The pt expresses understanding.    Galo Lipscomb, PhD, MP     Antidepressant/Antianxiety Medication Initiation:  Patient informed of risks, benefits, and potential side effects of medication and accepts informed consent.  Common side effects include nausea, fatigue, headache, insomnia., Specifically discussed the possibility of new or worsening suicidal thoughts/depression.  Patient instructed to stop the medication immediately and seek urgent treatment if this occurs. Patient instructed not to abruptly discontinue medication without physician guidance except in cases of sudden onset or worsening of SI.       Stimulant Medication Initiation:  Patient advised of risks, benefits, and side effects of medication and accepts informed consent.  Common side effects include insomnia, irritability, jittery feeling, dry mouth, and agitation/hostility., Patient advised of potential addictive nature of medication and controlled substance classification.  Instructed to safeguard medication as no early refills can be given for lost or stolen medications.       Benzodiazepine Initiation:  Patient advised of the risks, benefits, and common side effects of medication and has accepted informed consent.  Common side effects include drowsiness, impaired coordination, possible memory loss., Patient advised NOT to operate a vehicle or machinery untiil they are sure how the medication will affec them.  Client also advised of danger of mixing this medication with alcohol., Patient advised of potential addictive nature of medication and need to safeguard medication as no early refills for lost or stolen medications can be authorized.       Pregnancy Warning:  Patient denies current pregnancy possibility.  Patient made aware that medications have not been proven safe in pregnancy and that she must maintain adequate birth control.  Patient instructed to alert us immediately if  she becomes pregnant.       Sleep Aid Initiation:  Patient advised of potential side effects of medication including sleep walking or other complex behaviors.  Patient advised not to mix medication with alcohol, to go to bed immediately after taking, and to stop at first sign of any unusual behaviors.

## 2023-02-09 ENCOUNTER — PATIENT MESSAGE (OUTPATIENT)
Dept: FAMILY MEDICINE | Facility: CLINIC | Age: 78
End: 2023-02-09
Payer: MEDICARE

## 2023-02-11 ENCOUNTER — PATIENT MESSAGE (OUTPATIENT)
Dept: OPHTHALMOLOGY | Facility: CLINIC | Age: 78
End: 2023-02-11
Payer: MEDICARE

## 2023-02-13 ENCOUNTER — TELEPHONE (OUTPATIENT)
Dept: CARDIOLOGY | Facility: HOSPITAL | Age: 78
End: 2023-02-13
Payer: MEDICARE

## 2023-02-13 DIAGNOSIS — Z95.0 PRESENCE OF CARDIAC PACEMAKER: Primary | ICD-10-CM

## 2023-02-13 DIAGNOSIS — I50.32 CHRONIC DIASTOLIC HEART FAILURE: ICD-10-CM

## 2023-02-13 DIAGNOSIS — I48.0 PAROXYSMAL ATRIAL FIBRILLATION: ICD-10-CM

## 2023-02-14 RX ORDER — DORZOLAMIDE HYDROCHLORIDE AND TIMOLOL MALEATE 20; 5 MG/ML; MG/ML
1 SOLUTION/ DROPS OPHTHALMIC EVERY 12 HOURS
Qty: 10 ML | Refills: 11 | Status: SHIPPED | OUTPATIENT
Start: 2023-02-14 | End: 2023-05-11 | Stop reason: SDUPTHER

## 2023-02-20 ENCOUNTER — PATIENT MESSAGE (OUTPATIENT)
Dept: OPTOMETRY | Facility: CLINIC | Age: 78
End: 2023-02-20
Payer: MEDICARE

## 2023-02-23 ENCOUNTER — OFFICE VISIT (OUTPATIENT)
Dept: FAMILY MEDICINE | Facility: CLINIC | Age: 78
End: 2023-02-23
Attending: FAMILY MEDICINE
Payer: MEDICARE

## 2023-02-23 VITALS
HEIGHT: 67 IN | TEMPERATURE: 97 F | HEART RATE: 63 BPM | OXYGEN SATURATION: 99 % | SYSTOLIC BLOOD PRESSURE: 91 MMHG | DIASTOLIC BLOOD PRESSURE: 58 MMHG | BODY MASS INDEX: 16.33 KG/M2 | WEIGHT: 104.06 LBS | RESPIRATION RATE: 17 BRPM

## 2023-02-23 DIAGNOSIS — Z98.890 S/P ABLATION OF ATRIAL FLUTTER: ICD-10-CM

## 2023-02-23 DIAGNOSIS — M47.896 OTHER SPONDYLOSIS, LUMBAR REGION: ICD-10-CM

## 2023-02-23 DIAGNOSIS — J84.9 INTERSTITIAL LUNG DISEASE: ICD-10-CM

## 2023-02-23 DIAGNOSIS — R41.89 COGNITIVE IMPAIRMENT: ICD-10-CM

## 2023-02-23 DIAGNOSIS — H43.11 VITREOUS HEMORRHAGE, RIGHT EYE: ICD-10-CM

## 2023-02-23 DIAGNOSIS — K21.9 GASTROESOPHAGEAL REFLUX DISEASE WITHOUT ESOPHAGITIS: ICD-10-CM

## 2023-02-23 DIAGNOSIS — E88.09 HYPOALBUMINEMIA DUE TO PROTEIN-CALORIE MALNUTRITION: ICD-10-CM

## 2023-02-23 DIAGNOSIS — I27.20 PULMONARY HYPERTENSION: ICD-10-CM

## 2023-02-23 DIAGNOSIS — J45.20 MILD INTERMITTENT ASTHMA WITHOUT COMPLICATION: ICD-10-CM

## 2023-02-23 DIAGNOSIS — E46 HYPOALBUMINEMIA DUE TO PROTEIN-CALORIE MALNUTRITION: ICD-10-CM

## 2023-02-23 DIAGNOSIS — E78.2 MIXED HYPERLIPIDEMIA: ICD-10-CM

## 2023-02-23 DIAGNOSIS — F33.1 MODERATE EPISODE OF RECURRENT MAJOR DEPRESSIVE DISORDER: ICD-10-CM

## 2023-02-23 DIAGNOSIS — D50.0 IRON DEFICIENCY ANEMIA DUE TO CHRONIC BLOOD LOSS: ICD-10-CM

## 2023-02-23 DIAGNOSIS — R73.03 PREDIABETES: ICD-10-CM

## 2023-02-23 DIAGNOSIS — I10 PRIMARY HYPERTENSION: ICD-10-CM

## 2023-02-23 DIAGNOSIS — H40.1134 PRIMARY OPEN ANGLE GLAUCOMA (POAG) OF BOTH EYES, INDETERMINATE STAGE: ICD-10-CM

## 2023-02-23 DIAGNOSIS — M47.892 OTHER SPONDYLOSIS, CERVICAL REGION: ICD-10-CM

## 2023-02-23 DIAGNOSIS — Z95.0 CARDIAC PACEMAKER IN SITU: ICD-10-CM

## 2023-02-23 DIAGNOSIS — I48.0 PAROXYSMAL ATRIAL FIBRILLATION: ICD-10-CM

## 2023-02-23 DIAGNOSIS — F41.1 GAD (GENERALIZED ANXIETY DISORDER): ICD-10-CM

## 2023-02-23 DIAGNOSIS — Z00.00 ENCOUNTER FOR PREVENTIVE HEALTH EXAMINATION: Primary | ICD-10-CM

## 2023-02-23 DIAGNOSIS — Z86.79 S/P ABLATION OF ATRIAL FLUTTER: ICD-10-CM

## 2023-02-23 PROCEDURE — 1101F PT FALLS ASSESS-DOCD LE1/YR: CPT | Mod: CPTII,S$GLB,, | Performed by: FAMILY MEDICINE

## 2023-02-23 PROCEDURE — 99999 PR PBB SHADOW E&M-EST. PATIENT-LVL IV: ICD-10-PCS | Mod: PBBFAC,,, | Performed by: FAMILY MEDICINE

## 2023-02-23 PROCEDURE — 99999 PR PBB SHADOW E&M-EST. PATIENT-LVL IV: CPT | Mod: PBBFAC,,, | Performed by: FAMILY MEDICINE

## 2023-02-23 PROCEDURE — 3074F SYST BP LT 130 MM HG: CPT | Mod: CPTII,S$GLB,, | Performed by: FAMILY MEDICINE

## 2023-02-23 PROCEDURE — 3288F FALL RISK ASSESSMENT DOCD: CPT | Mod: CPTII,S$GLB,, | Performed by: FAMILY MEDICINE

## 2023-02-23 PROCEDURE — 1126F PR PAIN SEVERITY QUANTIFIED, NO PAIN PRESENT: ICD-10-PCS | Mod: CPTII,S$GLB,, | Performed by: FAMILY MEDICINE

## 2023-02-23 PROCEDURE — 1101F PR PT FALLS ASSESS DOC 0-1 FALLS W/OUT INJ PAST YR: ICD-10-PCS | Mod: CPTII,S$GLB,, | Performed by: FAMILY MEDICINE

## 2023-02-23 PROCEDURE — 1160F PR REVIEW ALL MEDS BY PRESCRIBER/CLIN PHARMACIST DOCUMENTED: ICD-10-PCS | Mod: CPTII,S$GLB,, | Performed by: FAMILY MEDICINE

## 2023-02-23 PROCEDURE — 1157F ADVNC CARE PLAN IN RCRD: CPT | Mod: CPTII,S$GLB,, | Performed by: FAMILY MEDICINE

## 2023-02-23 PROCEDURE — 3078F DIAST BP <80 MM HG: CPT | Mod: CPTII,S$GLB,, | Performed by: FAMILY MEDICINE

## 2023-02-23 PROCEDURE — 1126F AMNT PAIN NOTED NONE PRSNT: CPT | Mod: CPTII,S$GLB,, | Performed by: FAMILY MEDICINE

## 2023-02-23 PROCEDURE — 1159F PR MEDICATION LIST DOCUMENTED IN MEDICAL RECORD: ICD-10-PCS | Mod: CPTII,S$GLB,, | Performed by: FAMILY MEDICINE

## 2023-02-23 PROCEDURE — 1157F PR ADVANCE CARE PLAN OR EQUIV PRESENT IN MEDICAL RECORD: ICD-10-PCS | Mod: CPTII,S$GLB,, | Performed by: FAMILY MEDICINE

## 2023-02-23 PROCEDURE — 1160F RVW MEDS BY RX/DR IN RCRD: CPT | Mod: CPTII,S$GLB,, | Performed by: FAMILY MEDICINE

## 2023-02-23 PROCEDURE — 1159F MED LIST DOCD IN RCRD: CPT | Mod: CPTII,S$GLB,, | Performed by: FAMILY MEDICINE

## 2023-02-23 PROCEDURE — 3074F PR MOST RECENT SYSTOLIC BLOOD PRESSURE < 130 MM HG: ICD-10-PCS | Mod: CPTII,S$GLB,, | Performed by: FAMILY MEDICINE

## 2023-02-23 PROCEDURE — 99397 PR PREVENTIVE VISIT,EST,65 & OVER: ICD-10-PCS | Mod: S$GLB,,, | Performed by: FAMILY MEDICINE

## 2023-02-23 PROCEDURE — 3078F PR MOST RECENT DIASTOLIC BLOOD PRESSURE < 80 MM HG: ICD-10-PCS | Mod: CPTII,S$GLB,, | Performed by: FAMILY MEDICINE

## 2023-02-23 PROCEDURE — 3288F PR FALLS RISK ASSESSMENT DOCUMENTED: ICD-10-PCS | Mod: CPTII,S$GLB,, | Performed by: FAMILY MEDICINE

## 2023-02-23 PROCEDURE — 99397 PER PM REEVAL EST PAT 65+ YR: CPT | Mod: S$GLB,,, | Performed by: FAMILY MEDICINE

## 2023-02-23 RX ORDER — PANTOPRAZOLE SODIUM 40 MG/1
40 TABLET, DELAYED RELEASE ORAL DAILY
Qty: 90 TABLET | Refills: 3 | Status: SHIPPED | OUTPATIENT
Start: 2023-02-23 | End: 2024-03-19

## 2023-02-23 NOTE — PROGRESS NOTES
Subjective:       Patient ID: Ayla Dela Cruz is a 77 y.o. female.    Chief Complaint: Annual Exam (Pt states that she is here for her annual exam )    77-year-old female here with her  for annual exam and follow-up of multiple medical problems.  She has a past history of a stroke in 2014 as well as a TIA later, spondylosis of cervical and lumbar spine status post epidural steroid injections and multiple radiofrequency ablations, generalized anxiety disorder and major depression followed by Psychiatry, glaucoma with a previous hyphema of the right eye, interstitial lung disease, moderate intermittent asthma without complications, paroxysmal atrial fibrillation chads five on Eliquis with three ablations previously done in 2017, 1997, and 1995.  She has hypertension, hyperlipidemia, prediabetes, diastolic heart failure, second-degree AV block Mobitz type 1, history of deep vein thrombosis, chronic anticoagulation, iron deficiency anemia, reflux, elevated liver functions, protein calorie malnutrition with hypoalbuminemia.  She last had her colonoscopy with Dr. Christian October 10, 2019 with a three year recheck recommended and somewhat overdue.  Both she and her  are declining additional colonoscopies at the age of 77.  Her  reports that her cognitive function has improved with some changes in medication to avoid some sedative agents and she was also taken off of Abilify with improvement.  She is still taking low-dose amitriptyline at bedtime with some problems with dry mouth but otherwise she appears to do well with it and sleeps well at night with no problems.    Past Medical History:  No date: Anticoagulant long-term use  No date: Anxiety  No date: Arthritis  No date: Atrial fibrillation  No date: Cancer      Comment:  skin  No date: CHF (congestive heart failure)  No date: Depression  No date: DVT (deep venous thrombosis)  No date: GERD (gastroesophageal reflux disease)  No date: Glaucoma  (increased eye pressure)  No date: Hyperlipidemia      Comment:  diet controlled  No date: Hypertension  No date: Interstitial lung disease  01/10/2020: Localized hives  11/12/2018: Mild persistent asthma without complication  No date: Pacemaker  01/31/2014: Pneumonia  06/03/2014: Stroke  No date: Stroke  No date: TIA (transient ischemic attack)  No date: TIA (transient ischemic attack)    Past Surgical History:  No date: 2 heart ablations      Comment:  3 in total  10/14/2021: A-V CARDIAC PACEMAKER INSERTION; Left      Comment:  Procedure: INSERTION, CARDIAC PACEMAKER, DUAL CHAMBER;                 Surgeon: Fco Calderon MD;  Location: [facility] EP LAB;                 Service: Cardiology;  Laterality: Left;  PAF/ [place]                Arrthymias, Dual PPM, SJM, MAC, GP, 3 PREP  7/27/2022: ANTERIOR VITRECTOMY; Right      Comment:  Procedure: VITRECTOMY, ANTERIOR APPROACH;  Surgeon:                Daniel Tan MD;  Location: [facility];  Service:                Ophthalmology;  Laterality: Right;  No date: bilateral cataracts  No date: CHOLECYSTECTOMY  1/19/2017: COLONOSCOPY; N/A      Comment:  Procedure: COLONOSCOPY and EGD;  Surgeon: Jonathan Schultz MD;  Location: [facility];  Service: Endoscopy;                 Laterality: N/A;  10/10/2019: COLONOSCOPY; N/A      Comment:  Procedure: COLONOSCOPY;  Surgeon: Alex Christian MD;                Location: [facility];  Service: Endoscopy;  Laterality:                N/A;  10/14/2019: ESOPHAGOGASTRODUODENOSCOPY; N/A      Comment:  Procedure: EGD (ESOPHAGOGASTRODUODENOSCOPY);  Surgeon:                Alex Christian MD;  Location: [facility];  Service:                Endoscopy;  Laterality: N/A;  6/7/2018: INJECTION OF ANESTHETIC AGENT AROUND MEDIAL BRANCH NERVES   INNERVATING CERVICAL FACET JOINT; Right      Comment:  Procedure: BLOCK, NERVE, FACET JOINT, MEDIAL BRANCH,                CERVICAL;  Surgeon: Galo Clancy MD;  Location: [facility];                  Service: Pain Management;  Laterality: Right;  C4, 5, 6  11/1/2018: OPEN REDUCTION AND INTERNAL FIXATION (ORIF) OF INJURY OF   ANKLE; Right      Comment:  Procedure: ORIF, ANKLE;  Surgeon: Wilfredo Aguero MD;                Location: [facility];  Service: Orthopedics;  Laterality:                Right;  7/27/2022: PARACENTESIS, EYE, ANTERIOR CHAMBER, WITH AQUEOUS REMOVAL;   Right      Comment:  Procedure: Anterior chamber washout, possible IOL                removal;  Surgeon: Daniel Tan MD;  Location: [facility];  Service: Ophthalmology;  Laterality: Right;  No date: pyloristenosis  9/21/2018: RADIOFREQUENCY ABLATION OF LUMBAR MEDIAL BRANCH NERVE AT   SINGLE LEVEL; Bilateral      Comment:  Procedure: RADIOFREQUENCY ABLATION, NERVE, SPINAL,                LUMBAR, MEDIAL BRANCH, 1 LEVEL;  Surgeon: Galo Clancy MD;               Location: [facility];  Service: Pain Management;                 Laterality: Bilateral;  L3, 4, 5  2/19/2019: RADIOFREQUENCY ABLATION OF LUMBAR MEDIAL BRANCH NERVE AT   SINGLE LEVEL; Bilateral      Comment:  Procedure: Radiofrequency Ablation, Nerve, Spinal,                Lumbar, Medial Branch, 1 Level;  Surgeon: Galo Clancy MD;               Location: [facility];  Service: Pain Management;                 Laterality: Bilateral;  L3, 4, 5   3/10/2020: RADIOFREQUENCY ABLATION OF LUMBAR MEDIAL BRANCH NERVE AT   SINGLE LEVEL; Bilateral      Comment:  Procedure: Radiofrequency Ablation, Nerve, Spinal,                Lumbar, Medial Branch, 1 Level;  Surgeon: Galo Clancy MD;               Location: [facility];  Service: Pain Management;                 Laterality: Bilateral;  L3,4,5 - Burned at 80 degrees C.                for 60 seconds x 2 each site    9/11/2020: RADIOFREQUENCY ABLATION OF LUMBAR MEDIAL BRANCH NERVE AT   SINGLE LEVEL; Bilateral      Comment:  Procedure: Radiofrequency Ablation, Nerve, Spinal,                Lumbar, Medial Branch, 1 Level;  Surgeon: Galo Clancy MD;                Location: [facility];  Service: Pain Management;                 Laterality: Bilateral;  L3, 4, 5 - Burned at 80 degrees                C. for 60 seconds x 2 each site  5/28/2021: RADIOFREQUENCY ABLATION OF LUMBAR MEDIAL BRANCH NERVE AT   SINGLE LEVEL; Bilateral      Comment:  Procedure: Radiofrequency Ablation, Nerve, Spinal,                Lumbar, Medial Branch, 1 Level;  Surgeon: Galo Clancy MD;               Location: [facility] OR;  Service: Pain Management;                 Laterality: Bilateral;  L3,4,5  7/3/2018: RADIOFREQUENCY THERMAL COAGULATION OF MEDIAL BRANCH OF   POSTERIOR RAMUS OF CERVICAL SPINAL NERVE; Right      Comment:  Procedure: RADIOFREQUENCY THERMAL COAGULATION, NERVE,                SPINAL, CERVICAL, MEDIAL BRANCH OF POSTERIOR RAMUS;                 Surgeon: Galo Clancy MD;  Location: [facility] OR;  Service:                Pain Management;  Laterality: Right;  C4,5,6 - Burned at                80 degrees C. for 75 seconds each site  7/23/2019: RADIOFREQUENCY THERMAL COAGULATION OF MEDIAL BRANCH OF   POSTERIOR RAMUS OF CERVICAL SPINAL NERVE; Right      Comment:  Procedure: RADIOFREQUENCY THERMAL COAGULATION, NERVE,                SPINAL, CERVICAL, POSTERIOR RAMUS, MEDIAL BRANCH;                 Surgeon: Galo Clancy MD;  Location: [facility] OR;  Service:                Pain Management;  Laterality: Right;  C4,5,6  6/23/2020: RADIOFREQUENCY THERMAL COAGULATION OF MEDIAL BRANCH OF   POSTERIOR RAMUS OF CERVICAL SPINAL NERVE; Right      Comment:  Procedure: RADIOFREQUENCY THERMAL COAGULATION, NERVE,                SPINAL, CERVICAL, POSTERIOR RAMUS, MEDIAL BRANCH;                 Surgeon: Galo Clancy MD;  Location: [facility] OR;  Service:                Pain Management;  Laterality: Right;  C4, 5, 6  9/10/2019: RADIOFREQUENCY THERMOCOAGULATION; Bilateral      Comment:  Procedure: RADIOFREQUENCY THERMAL COAGULATION LUMBAR;                 Surgeon: Galo Clancy MD;  Location: [facility] OR;  Service:                Pain  Management;  Laterality: Bilateral;  L3,4,5 - Burned               at 80 degrees C. for 60 seconds x 2 each site  No date: skin cancer removal   No date: TONSILLECTOMY    Review of patient's family history indicates:    Social History    Tobacco Use      Smoking status: Never      Smokeless tobacco: Never    Alcohol use: No    Drug use: No    Current Outpatient Medications on File Prior to Visit:  amitriptyline (ELAVIL) 25 MG tablet, TAKE 1.5 TABLETS (37.5 MG TOTAL) BY MOUTH NIGHTLY., Disp: 45 tablet, Rfl: 1  aspirin 81 MG Chew, TAKE 1 TABLET BY MOUTH EVERY DAY, Disp: 90 tablet, Rfl: 4  ATIVAN 0.5 mg tablet, Take 1 tablet (0.5 mg total) by mouth 2 (two) times daily as needed for Anxiety., Disp: 60 tablet, Rfl: 0  atropine 1% (ISOPTO ATROPINE) 1 % Drop, Place 1 drop into the right eye 2 (two) times a day., Disp: 5 mL, Rfl: 6  busPIRone (BUSPAR) 10 MG tablet, TAKE 1 TABLET BY MOUTH THREE TIMES A DAY, Disp: 90 tablet, Rfl: 1  dorzolamide-timolol 2-0.5% (COSOPT) 22.3-6.8 mg/mL ophthalmic solution, Place 1 drop into both eyes every 12 (twelve) hours., Disp: 10 mL, Rfl: 11  metoprolol tartrate (LOPRESSOR) 25 MG tablet, TAKE 1 TABLET BY MOUTH TWICE A DAY, Disp: 180 tablet, Rfl: 3  rosuvastatin (CRESTOR) 40 MG Tab, Take 1 tablet (40 mg total) by mouth every evening., Disp: 90 tablet, Rfl: 3  TRINTELLIX 10 mg Tab, Take 1 tablet (10 mg total) by mouth nightly., Disp: 90 tablet, Rfl: 1  [DISCONTINUED] apixaban (ELIQUIS) 5 mg Tab, Take 1 tablet (5 mg total) by mouth 2 (two) times daily., Disp: 60 tablet, Rfl: 11  [DISCONTINUED] pantoprazole (PROTONIX) 40 MG tablet, Take 1 tablet (40 mg total) by mouth once daily., Disp: 90 tablet, Rfl: 3  [DISCONTINUED] brimonidine 0.2% (ALPHAGAN) 0.2 % Drop, Place 1 drop into the right eye 3 (three) times daily., Disp: 5 mL, Rfl: 3    Current Facility-Administered Medications on File Prior to Visit:  bupivacaine (PF) 0.25% (2.5 mg/ml) injection, , , PRN, Galo Clancy MD, 6 mL at 02/19/19  1314  lidocaine (PF) 10 mg/ml (1%) injection, , , PRN, Galo Clancy MD, 9 mL at 02/19/19 1304  lidocaine (PF) 20 mg/ml (2%) injection, , , PRN, Galo Clancy MD, 6 mL at 02/19/19 1310  methylPREDNISolone acetate injection, , , PRN, Galo Clancy MD, 80 mg at 02/19/19 1314            Review of Systems   Constitutional:  Negative for chills, diaphoresis, fatigue, fever and unexpected weight change.   HENT:  Negative for congestion, ear pain, hearing loss, postnasal drip and sinus pressure.    Eyes:  Negative for itching and visual disturbance.   Respiratory:  Negative for cough, chest tightness, shortness of breath and wheezing.    Cardiovascular:  Negative for chest pain, palpitations and leg swelling.   Gastrointestinal:  Negative for abdominal pain, blood in stool, constipation, diarrhea, nausea and vomiting.   Genitourinary:  Negative for dysuria, frequency and hematuria.   Musculoskeletal:  Negative for arthralgias, back pain, joint swelling and myalgias.   Neurological:  Negative for dizziness and headaches.   Hematological:  Negative for adenopathy.   Psychiatric/Behavioral:  Negative for sleep disturbance. The patient is not nervous/anxious.      Objective:      Physical Exam  Vitals and nursing note reviewed.   Constitutional:       General: She is not in acute distress.     Appearance: Normal appearance. She is well-developed. She is not ill-appearing, toxic-appearing or diaphoretic.      Comments: Low normal blood pressure   Heart regular rate and rhythm with pacer rhythm   Underweight with a BMI of 16.3 she is down 1.7 lb from her last physical February 16, 2022   HENT:      Head: Normocephalic and atraumatic.      Right Ear: Tympanic membrane, ear canal and external ear normal. There is no impacted cerumen.      Left Ear: Tympanic membrane, ear canal and external ear normal. There is no impacted cerumen.      Nose: Nose normal. No congestion or rhinorrhea.      Mouth/Throat:      Mouth: Mucous membranes are  moist.      Pharynx: Oropharynx is clear. No oropharyngeal exudate or posterior oropharyngeal erythema.   Eyes:      General: No scleral icterus.        Right eye: No discharge.         Left eye: No discharge.      Extraocular Movements: Extraocular movements intact.      Conjunctiva/sclera: Conjunctivae normal.      Pupils: Pupils are equal, round, and reactive to light.   Neck:      Thyroid: No thyromegaly.      Vascular: No carotid bruit or JVD.   Cardiovascular:      Rate and Rhythm: Normal rate and regular rhythm.      Heart sounds: Normal heart sounds. No murmur heard.    No friction rub. No gallop.   Pulmonary:      Effort: Pulmonary effort is normal. No respiratory distress.      Breath sounds: Normal breath sounds. No stridor. No wheezing, rhonchi or rales.   Chest:      Chest wall: No tenderness.   Abdominal:      General: Bowel sounds are normal. There is no distension.      Palpations: Abdomen is soft. There is no mass.      Tenderness: There is no abdominal tenderness. There is no guarding or rebound.      Hernia: No hernia is present.   Musculoskeletal:         General: No swelling, tenderness, deformity or signs of injury. Normal range of motion.      Cervical back: Normal range of motion and neck supple. No rigidity or tenderness.      Right lower leg: No edema.      Left lower leg: No edema.   Lymphadenopathy:      Cervical: No cervical adenopathy.   Skin:     General: Skin is warm and dry.      Coloration: Skin is not jaundiced or pale.      Findings: No bruising, erythema, lesion or rash.   Neurological:      General: No focal deficit present.      Mental Status: She is alert and oriented to person, place, and time. Mental status is at baseline.      Cranial Nerves: No cranial nerve deficit.      Deep Tendon Reflexes: Reflexes are normal and symmetric. Reflexes normal.   Psychiatric:         Attention and Perception: She is inattentive.         Mood and Affect: Mood is not anxious. Affect is flat.          Speech: Speech is delayed.         Behavior: Behavior is slowed. Behavior is cooperative.         Cognition and Memory: Cognition is impaired.       Assessment:       1. Encounter for preventive health examination    2. Other spondylosis, lumbar region    3. Other spondylosis, cervical region    4. Cognitive impairment    5. Moderate episode of recurrent major depressive disorder    6. JOSE (generalized anxiety disorder)    7. Primary open angle glaucoma (POAG) of both eyes, indeterminate stage    8. Pulmonary hypertension, without RV dysfunction    9. Mild intermittent asthma without complication    10. Interstitial lung disease    11. S/P ablation of atrial flutter    12. Primary hypertension    13. Mixed hyperlipidemia    14. Cardiac pacemaker in situ, [employer], dual chamber, 10/14/2021    15. Paroxysmal atrial fibrillation    16. Iron deficiency anemia due to chronic blood loss    17. Hypoalbuminemia due to protein-calorie malnutrition    18. Vitreous hemorrhage, right eye    19. Prediabetes    20. Gastroesophageal reflux disease without esophagitis          Plan:       1. Encounter for preventive health examination  - Lipid Panel; Future  - Comprehensive Metabolic Panel; Future  - CBC Auto Differential; Future    2. Other spondylosis, lumbar region  Asymptomatic currently    3. Other spondylosis, cervical region  Asymptomatic currently    4. Cognitive impairment  Stable, followed by Psychiatry    5. Moderate episode of recurrent major depressive disorder  Stable, followed by Psychiatry    6. JOSE (generalized anxiety disorder)  See above    7. Primary open angle glaucoma (POAG) of both eyes, indeterminate stage  Followed by Ophthalmology    8. Pulmonary hypertension, without RV dysfunction  Asymptomatic no shortness of breath with mild exertion    9. Mild intermittent asthma without complication  Asymptomatic    10. Interstitial lung disease  Asymptomatic    11. S/P ablation of atrial  flutter  Pacemaker rhythm    12. Primary hypertension  Well controlled no changes needed in Lopressor 25 b.i.d.  - Lipid Panel; Future  - Comprehensive Metabolic Panel; Future  - CBC Auto Differential; Future    13. Mixed hyperlipidemia  Lipid panel ordered and pending  - Lipid Panel; Future  - Comprehensive Metabolic Panel; Future    14. Cardiac pacemaker in situ, [employer], dual chamber, 10/14/2021  Followed by Cardiology    15. Paroxysmal atrial fibrillation  Status post ablation and pacemaker followed by Cardiology    16. Iron deficiency anemia due to chronic blood loss  Await CBC results  - CBC Auto Differential; Future    17. Hypoalbuminemia due to protein-calorie malnutrition  Await chemistry panel results  - Comprehensive Metabolic Panel; Future    18. Vitreous hemorrhage, right eye  Followed by Ophthalmology    19. Prediabetes  Await A1c result  - Hemoglobin A1C; Future    20. Gastroesophageal reflux disease without esophagitis  Asymptomatic on Protonix  - pantoprazole (PROTONIX) 40 MG tablet; Take 1 tablet (40 mg total) by mouth once daily.  Dispense: 90 tablet; Refill: 3

## 2023-03-01 ENCOUNTER — LAB VISIT (OUTPATIENT)
Dept: LAB | Facility: HOSPITAL | Age: 78
End: 2023-03-01
Attending: FAMILY MEDICINE
Payer: MEDICARE

## 2023-03-01 DIAGNOSIS — D50.0 IRON DEFICIENCY ANEMIA DUE TO CHRONIC BLOOD LOSS: ICD-10-CM

## 2023-03-01 DIAGNOSIS — I10 PRIMARY HYPERTENSION: ICD-10-CM

## 2023-03-01 DIAGNOSIS — E78.2 MIXED HYPERLIPIDEMIA: ICD-10-CM

## 2023-03-01 DIAGNOSIS — E46 HYPOALBUMINEMIA DUE TO PROTEIN-CALORIE MALNUTRITION: ICD-10-CM

## 2023-03-01 DIAGNOSIS — E88.09 HYPOALBUMINEMIA DUE TO PROTEIN-CALORIE MALNUTRITION: ICD-10-CM

## 2023-03-01 DIAGNOSIS — R73.03 PREDIABETES: ICD-10-CM

## 2023-03-01 DIAGNOSIS — Z00.00 ENCOUNTER FOR PREVENTIVE HEALTH EXAMINATION: ICD-10-CM

## 2023-03-01 LAB
ALBUMIN SERPL BCP-MCNC: 3.7 G/DL (ref 3.5–5.2)
ALP SERPL-CCNC: 109 U/L (ref 55–135)
ALT SERPL W/O P-5'-P-CCNC: 109 U/L (ref 10–44)
ANION GAP SERPL CALC-SCNC: 9 MMOL/L (ref 8–16)
AST SERPL-CCNC: 102 U/L (ref 10–40)
BASOPHILS # BLD AUTO: 0.05 K/UL (ref 0–0.2)
BASOPHILS NFR BLD: 0.7 % (ref 0–1.9)
BILIRUB SERPL-MCNC: 1.2 MG/DL (ref 0.1–1)
BUN SERPL-MCNC: 22 MG/DL (ref 8–23)
CALCIUM SERPL-MCNC: 9.8 MG/DL (ref 8.7–10.5)
CHLORIDE SERPL-SCNC: 107 MMOL/L (ref 95–110)
CHOLEST SERPL-MCNC: 146 MG/DL (ref 120–199)
CHOLEST/HDLC SERPL: 2.4 {RATIO} (ref 2–5)
CO2 SERPL-SCNC: 24 MMOL/L (ref 23–29)
CREAT SERPL-MCNC: 0.8 MG/DL (ref 0.5–1.4)
DIFFERENTIAL METHOD: ABNORMAL
EOSINOPHIL # BLD AUTO: 0.3 K/UL (ref 0–0.5)
EOSINOPHIL NFR BLD: 4.1 % (ref 0–8)
ERYTHROCYTE [DISTWIDTH] IN BLOOD BY AUTOMATED COUNT: 13.7 % (ref 11.5–14.5)
EST. GFR  (NO RACE VARIABLE): >60 ML/MIN/1.73 M^2
ESTIMATED AVG GLUCOSE: 114 MG/DL (ref 68–131)
GLUCOSE SERPL-MCNC: 86 MG/DL (ref 70–110)
HBA1C MFR BLD: 5.6 % (ref 4–5.6)
HCT VFR BLD AUTO: 40.3 % (ref 37–48.5)
HDLC SERPL-MCNC: 60 MG/DL (ref 40–75)
HDLC SERPL: 41.1 % (ref 20–50)
HGB BLD-MCNC: 13.1 G/DL (ref 12–16)
IMM GRANULOCYTES # BLD AUTO: 0.02 K/UL (ref 0–0.04)
IMM GRANULOCYTES NFR BLD AUTO: 0.3 % (ref 0–0.5)
LDLC SERPL CALC-MCNC: 73.4 MG/DL (ref 63–159)
LYMPHOCYTES # BLD AUTO: 1.1 K/UL (ref 1–4.8)
LYMPHOCYTES NFR BLD: 15.5 % (ref 18–48)
MCH RBC QN AUTO: 32.3 PG (ref 27–31)
MCHC RBC AUTO-ENTMCNC: 32.5 G/DL (ref 32–36)
MCV RBC AUTO: 99 FL (ref 82–98)
MONOCYTES # BLD AUTO: 0.9 K/UL (ref 0.3–1)
MONOCYTES NFR BLD: 12.9 % (ref 4–15)
NEUTROPHILS # BLD AUTO: 4.6 K/UL (ref 1.8–7.7)
NEUTROPHILS NFR BLD: 66.5 % (ref 38–73)
NONHDLC SERPL-MCNC: 86 MG/DL
NRBC BLD-RTO: 0 /100 WBC
PLATELET # BLD AUTO: 212 K/UL (ref 150–450)
PMV BLD AUTO: 9.4 FL (ref 9.2–12.9)
POTASSIUM SERPL-SCNC: 4.3 MMOL/L (ref 3.5–5.1)
PROT SERPL-MCNC: 6.8 G/DL (ref 6–8.4)
RBC # BLD AUTO: 4.06 M/UL (ref 4–5.4)
SODIUM SERPL-SCNC: 140 MMOL/L (ref 136–145)
TRIGL SERPL-MCNC: 63 MG/DL (ref 30–150)
WBC # BLD AUTO: 6.84 K/UL (ref 3.9–12.7)

## 2023-03-01 PROCEDURE — 80061 LIPID PANEL: CPT | Performed by: FAMILY MEDICINE

## 2023-03-01 PROCEDURE — 36415 COLL VENOUS BLD VENIPUNCTURE: CPT | Performed by: FAMILY MEDICINE

## 2023-03-01 PROCEDURE — 85025 COMPLETE CBC W/AUTO DIFF WBC: CPT | Performed by: FAMILY MEDICINE

## 2023-03-01 PROCEDURE — 83036 HEMOGLOBIN GLYCOSYLATED A1C: CPT | Performed by: FAMILY MEDICINE

## 2023-03-01 PROCEDURE — 80053 COMPREHEN METABOLIC PANEL: CPT | Performed by: FAMILY MEDICINE

## 2023-03-06 ENCOUNTER — PATIENT MESSAGE (OUTPATIENT)
Dept: FAMILY MEDICINE | Facility: CLINIC | Age: 78
End: 2023-03-06
Payer: MEDICARE

## 2023-03-06 ENCOUNTER — OFFICE VISIT (OUTPATIENT)
Dept: OPHTHALMOLOGY | Facility: CLINIC | Age: 78
End: 2023-03-06
Payer: MEDICARE

## 2023-03-06 ENCOUNTER — CLINICAL SUPPORT (OUTPATIENT)
Dept: OPHTHALMOLOGY | Facility: CLINIC | Age: 78
End: 2023-03-06
Payer: MEDICARE

## 2023-03-06 DIAGNOSIS — H40.1121 PRIMARY OPEN-ANGLE GLAUCOMA, LEFT EYE, MILD STAGE: ICD-10-CM

## 2023-03-06 DIAGNOSIS — H20.9 UVEITIS-HYPHEMA-GLAUCOMA SYNDROME OF RIGHT EYE: ICD-10-CM

## 2023-03-06 DIAGNOSIS — H40.1112 PRIMARY OPEN ANGLE GLAUCOMA (POAG) OF RIGHT EYE, MODERATE STAGE: Primary | ICD-10-CM

## 2023-03-06 DIAGNOSIS — T85.22XD IRIS CAPTURE OF INTRAOCULAR LENS, SUBSEQUENT ENCOUNTER: ICD-10-CM

## 2023-03-06 DIAGNOSIS — H04.123 DRY EYE SYNDROME, BILATERAL: ICD-10-CM

## 2023-03-06 DIAGNOSIS — T85.398A UVEITIS-HYPHEMA-GLAUCOMA SYNDROME OF RIGHT EYE: ICD-10-CM

## 2023-03-06 DIAGNOSIS — H40.41X0 UVEITIS-HYPHEMA-GLAUCOMA SYNDROME OF RIGHT EYE: ICD-10-CM

## 2023-03-06 DIAGNOSIS — R74.8 ELEVATED LIVER ENZYMES: Primary | ICD-10-CM

## 2023-03-06 PROCEDURE — 99999 PR PBB SHADOW E&M-EST. PATIENT-LVL III: CPT | Mod: PBBFAC,,, | Performed by: OPHTHALMOLOGY

## 2023-03-06 PROCEDURE — 1159F PR MEDICATION LIST DOCUMENTED IN MEDICAL RECORD: ICD-10-PCS | Mod: CPTII,S$GLB,, | Performed by: OPHTHALMOLOGY

## 2023-03-06 PROCEDURE — 99214 OFFICE O/P EST MOD 30 MIN: CPT | Mod: S$GLB,,, | Performed by: OPHTHALMOLOGY

## 2023-03-06 PROCEDURE — 99214 PR OFFICE/OUTPT VISIT, EST, LEVL IV, 30-39 MIN: ICD-10-PCS | Mod: S$GLB,,, | Performed by: OPHTHALMOLOGY

## 2023-03-06 PROCEDURE — 1126F AMNT PAIN NOTED NONE PRSNT: CPT | Mod: CPTII,S$GLB,, | Performed by: OPHTHALMOLOGY

## 2023-03-06 PROCEDURE — 92133 POSTERIOR SEGMENT OCT OPTIC NERVE(OCULAR COHERENCE TOMOGRAPHY) - OU - BOTH EYES: ICD-10-PCS | Mod: S$GLB,,, | Performed by: OPHTHALMOLOGY

## 2023-03-06 PROCEDURE — 1157F PR ADVANCE CARE PLAN OR EQUIV PRESENT IN MEDICAL RECORD: ICD-10-PCS | Mod: CPTII,S$GLB,, | Performed by: OPHTHALMOLOGY

## 2023-03-06 PROCEDURE — 1160F RVW MEDS BY RX/DR IN RCRD: CPT | Mod: CPTII,S$GLB,, | Performed by: OPHTHALMOLOGY

## 2023-03-06 PROCEDURE — 99999 PR PBB SHADOW E&M-EST. PATIENT-LVL III: ICD-10-PCS | Mod: PBBFAC,,, | Performed by: OPHTHALMOLOGY

## 2023-03-06 PROCEDURE — 1160F PR REVIEW ALL MEDS BY PRESCRIBER/CLIN PHARMACIST DOCUMENTED: ICD-10-PCS | Mod: CPTII,S$GLB,, | Performed by: OPHTHALMOLOGY

## 2023-03-06 PROCEDURE — 1159F MED LIST DOCD IN RCRD: CPT | Mod: CPTII,S$GLB,, | Performed by: OPHTHALMOLOGY

## 2023-03-06 PROCEDURE — 1126F PR PAIN SEVERITY QUANTIFIED, NO PAIN PRESENT: ICD-10-PCS | Mod: CPTII,S$GLB,, | Performed by: OPHTHALMOLOGY

## 2023-03-06 PROCEDURE — 1157F ADVNC CARE PLAN IN RCRD: CPT | Mod: CPTII,S$GLB,, | Performed by: OPHTHALMOLOGY

## 2023-03-06 PROCEDURE — 92083 EXTENDED VISUAL FIELD XM: CPT | Mod: S$GLB,,, | Performed by: OPHTHALMOLOGY

## 2023-03-06 PROCEDURE — 92133 CPTRZD OPH DX IMG PST SGM ON: CPT | Mod: S$GLB,,, | Performed by: OPHTHALMOLOGY

## 2023-03-06 PROCEDURE — 92083 HUMPHREY VISUAL FIELD - OU - BOTH EYES: ICD-10-PCS | Mod: S$GLB,,, | Performed by: OPHTHALMOLOGY

## 2023-03-06 NOTE — PROGRESS NOTES
HPI    Pt presents for 4 mo IOP check and HVF    States eyes are itchy and blurry today     Dorz/Hao BID OU   Atropine BID OD       Last edited by Yamileth Vazquez on 3/6/2023  2:19 PM.            Assessment /Plan     For exam results, see Encounter Report.    Primary open angle glaucoma (POAG) of right eye, moderate stage    Primary open-angle glaucoma, left eye, mild stage    Uveitis-hyphema-glaucoma syndrome of right eye    Iris capture of intraocular lens, subsequent encounter      1. Primary open angle glaucoma (POAG) of right eye, moderate stage  2. Primary open-angle glaucoma, left eye, mild stage  +famhx  Avg pachy    Tmax 20/18 per outside records  Tmax 30+ OD with hyphema  UGH syndrome and IOL malposition OD  Hx of Hyphema OD x 2, requiring washout, Cornea blood staining  HVF moderate damage OD, mild damage OS  OCT NFL damaged OD, normal OS    IOP excellent  Continue  Dorz/hao bid OU    F/u 4 months, IOP check    3. Uveitis-hyphema-glaucoma syndrome of right eye  HX AC washout and surgical iridectomy OD  7/2022  Doing well, AC quiet    Continue atropine BID OD    4. Iris capture of intraocular lens, subsequent encounter  As above  Query secondary IOL with retina?    5. GABE  Start lubricant drops

## 2023-03-06 NOTE — PROGRESS NOTES
24-2 VF done        Assessment /Plan     For exam results, see Encounter Report.    There are no diagnoses linked to this encounter.

## 2023-03-06 NOTE — PROGRESS NOTES
aSubjective:      Cardiologist: Barrett Jurado MD    I had the pleasure of seeing Ayla Dela Cruz in follow up for her history of atrial arrhythmias and PVI. She last saw me in 3/2022. She is a 77 year old female with HTN, HLD, and TIA in 2014, whose history of arrhythmias dates back to her 30s when she began to experience sudden onset palpitations. She was started on Tambacor at that time, which improved her symptoms. She was started on Coumadin at age 55 years. In the mid-1990s, she underwent an ablation for what sounds like atrial flutter in [place], [place]. In 1997 she underwent a second ablation which she was told was unsuccessful. Since that time she has undergone two electrical cardioversions, once in the mid-2000s (which lasted 5 years), and another around 2010. Around 2014 her arrhythmias recurred around the time she had her TIA, for which she was placed on Xarelto.    I reviewed all ECGs in the EMR at her initial office visit. ECGs from [id number] showed sinus rhythm. ECGs from 1/2014 and 4/2014 showed AF. ECGs between 6/2014 and 1/2016 showed sinus bradycardia and NSR. All ECGs between 1/16/2016 and 5/2017 showed either AF or AFL. When in AFL, RVR was generally seen.    In 9/2017, a PVI was performed. All four PVs were reconnected from a prior PVI, and successfully reisolated. A septal MA flutter line was also created due to frequent inductions during the case. The existing CTI-line was found to be intact. She was discharged on Amiodarone 100 mg daily. At her 11/2017 follow-up, Ms. Dela Cruz was feeling well apart from occasional AVILA relieved with lasix. Her energy level had vastly improved following ablation. Amiodarone was stopped at that visit (I was concerned it was causing her intermittent AVILA). Stopping Amiodarone improved her symptoms. A 48 hour Holter monitor performed in 11/2017 showed sinus rhythm with an average HR of 58 bpm.     At her 2/2018 visit Ms. Dela Cruz continued to feel well,  with no complaints. However, beginning in early 8/2018, Ms. Dela Cruz began to note significant AVILA, lightheadedness, and dizziness, which temporally correlated with her starting Tramadol. She had been off this medication for a few days. She also cut losartan from 100 mg to 50 mg once daily on her own. At her 9/2018 visit I reviewed recent HR and BP trends, with SBPs frequently in the 80-90s mmHg range. At that visit I stopped losartan. A 48 hour Holter monitor showed sinus rhythm with an average HR of 63 bpm, with a 6.6% PAC burden.     At her visit in 11/2018, Ms. Dela Cruz had an episode of near syncope in [facility], which resulted in a right ankle break for which she required surgery. Otherwise she was feeling much better, with improved breathing and lightheadedness.    At her 1/2020 visit, she described multiple issues with NOACs. She specifically described pruritis on Xarelto, and pruritis on Eliquis. At the time she was on Pradaxa and tolerating it. Ms. 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    At her 11/2020 visit Ms. Dela Cruz was tolerating pradaxa, and denied GI bleeding events. She had irregular heartbeats on occasion but not palpitations. At her request, pradaxa was switched back to eliquis.     ECGs from earlier in 2021 show profound sinus bradycardia with occasional extrasystoles and compensatory pauses (beta blocker has since been down-titrated). In 9/2021 Ms. Dela Cruz presented to [facility] with palpitations and skipped beats. The initial ECG was consistent with type 1 second-degree AVB with occasional extraystoles, the second consistent with a paroxysms of atypical flutter. ECGs associated with a negative NST also shows periods consistent with atypical flutter.    At her last visit in 9/2021 we discussed options including re-do PVI vs [employer] dual chamber pacemaker implantation and sotalol initiation. She chose the latter,  which was performed in 10/2021. She was not started on an antiarrhythmic post-procedure.    At her 3/2022 visit, a device check showed 10 AMS episodes (longest 19 minutes), AF burden <1%. Plan at that time was to start sotalol if AF burden began to increase.    I reviewed today's device interrogation which shows stable device/lead function, RA pacing 82%, RV pacing 8.5%, 18 AMS episodes (longest 2 minutes), AF burden <1%, battery 8-9 years.    My interpretation of today's ECG is atrial pacing at 60 bpm with a QTc of 464 ms.    Review of Systems   Constitutional: Negative for decreased appetite, malaise/fatigue, weight gain and weight loss.   HENT:  Negative for sore throat.    Eyes:  Negative for blurred vision.   Cardiovascular:  Negative for chest pain, dyspnea on exertion, irregular heartbeat, leg swelling, near-syncope, orthopnea, palpitations, paroxysmal nocturnal dyspnea and syncope.   Respiratory:  Negative for shortness of breath.    Skin:  Negative for rash.   Musculoskeletal:  Negative for arthritis.   Gastrointestinal:  Negative for abdominal pain.   Neurological:  Negative for focal weakness and light-headedness.   Psychiatric/Behavioral:  Negative for altered mental status.       Objective:    Physical Exam  Vitals reviewed.   Constitutional:       General: She is not in acute distress.     Appearance: She is well-developed.   HENT:      Head: Normocephalic and atraumatic.   Eyes:      General: No scleral icterus.     Conjunctiva/sclera: Conjunctivae normal.      Pupils: Pupils are equal, round, and reactive to light.   Neck:      Thyroid: No thyromegaly.      Vascular: No JVD.   Cardiovascular:      Rate and Rhythm: Normal rate and regular rhythm.      Pulses: Intact distal pulses.      Heart sounds: Normal heart sounds. No murmur heard.    No friction rub. No gallop.   Pulmonary:      Effort: Pulmonary effort is normal. No respiratory distress.      Breath sounds: Normal breath sounds. No rales.    Abdominal:      General: Bowel sounds are normal. There is no distension.      Palpations: Abdomen is soft.   Musculoskeletal:      Cervical back: Normal range of motion and neck supple.   Skin:     General: Skin is warm and dry.   Neurological:      Mental Status: She is alert and oriented to person, place, and time.   Psychiatric:         Behavior: Behavior normal.         Assessment:       1. H/O: CVA (cerebrovascular accident), June 2014    2. Paroxysmal atrial fibrillation, ablations X 3 1995, 1997, 9/2017, CHADS-VAS score 5, HAS-BLED score 5     3. Primary hypertension         Plan:     In summary, Ayla Dela Cruz is a 76 year old female with a longstanding history of atrial arrhythmias and prior ablations at outside institutions. Her [id number] Score is 5 (age, female, TIA, HTN) so she should remain on anticoagulation indefinitely. She is now 4 years post-PVI and MA flutter line, and was maintaining sinus rhythm off Amiodarone until 9/2021. She is now status-post dual chamber pacemaker implantation. AMS burden <1%    Plan is to hold the course and see me again in 1 year. Should she have increased AF burden the plan will be to start sotalol 40 mg bid.    Ms. Dela Cruz had a likely cardioembolic event after stopping AC for a week. 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     Thank you for allowing me to participate in the care of this patient. Please do not hesitate to call me with any questions or concerns.

## 2023-03-08 ENCOUNTER — OFFICE VISIT (OUTPATIENT)
Dept: CARDIOLOGY | Facility: CLINIC | Age: 78
End: 2023-03-08
Payer: MEDICARE

## 2023-03-08 VITALS
DIASTOLIC BLOOD PRESSURE: 70 MMHG | BODY MASS INDEX: 16.32 KG/M2 | SYSTOLIC BLOOD PRESSURE: 100 MMHG | RESPIRATION RATE: 16 BRPM | OXYGEN SATURATION: 100 % | HEIGHT: 67 IN | HEART RATE: 79 BPM | WEIGHT: 104 LBS

## 2023-03-08 DIAGNOSIS — I10 PRIMARY HYPERTENSION: ICD-10-CM

## 2023-03-08 DIAGNOSIS — Z86.73 H/O: CVA (CEREBROVASCULAR ACCIDENT): Primary | ICD-10-CM

## 2023-03-08 DIAGNOSIS — I48.0 PAROXYSMAL ATRIAL FIBRILLATION: ICD-10-CM

## 2023-03-08 PROCEDURE — 3288F PR FALLS RISK ASSESSMENT DOCUMENTED: ICD-10-PCS | Mod: CPTII,S$GLB,, | Performed by: INTERNAL MEDICINE

## 2023-03-08 PROCEDURE — 1157F ADVNC CARE PLAN IN RCRD: CPT | Mod: CPTII,S$GLB,, | Performed by: INTERNAL MEDICINE

## 2023-03-08 PROCEDURE — 93005 EKG 12-LEAD: ICD-10-PCS | Mod: S$GLB,,, | Performed by: INTERNAL MEDICINE

## 2023-03-08 PROCEDURE — 99214 PR OFFICE/OUTPT VISIT, EST, LEVL IV, 30-39 MIN: ICD-10-PCS | Mod: S$GLB,,, | Performed by: INTERNAL MEDICINE

## 2023-03-08 PROCEDURE — 3074F PR MOST RECENT SYSTOLIC BLOOD PRESSURE < 130 MM HG: ICD-10-PCS | Mod: CPTII,S$GLB,, | Performed by: INTERNAL MEDICINE

## 2023-03-08 PROCEDURE — 1126F PR PAIN SEVERITY QUANTIFIED, NO PAIN PRESENT: ICD-10-PCS | Mod: CPTII,S$GLB,, | Performed by: INTERNAL MEDICINE

## 2023-03-08 PROCEDURE — 93010 EKG 12-LEAD: ICD-10-PCS | Mod: S$GLB,,, | Performed by: INTERNAL MEDICINE

## 2023-03-08 PROCEDURE — 1159F MED LIST DOCD IN RCRD: CPT | Mod: CPTII,S$GLB,, | Performed by: INTERNAL MEDICINE

## 2023-03-08 PROCEDURE — 3078F PR MOST RECENT DIASTOLIC BLOOD PRESSURE < 80 MM HG: ICD-10-PCS | Mod: CPTII,S$GLB,, | Performed by: INTERNAL MEDICINE

## 2023-03-08 PROCEDURE — 1101F PT FALLS ASSESS-DOCD LE1/YR: CPT | Mod: CPTII,S$GLB,, | Performed by: INTERNAL MEDICINE

## 2023-03-08 PROCEDURE — 1101F PR PT FALLS ASSESS DOC 0-1 FALLS W/OUT INJ PAST YR: ICD-10-PCS | Mod: CPTII,S$GLB,, | Performed by: INTERNAL MEDICINE

## 2023-03-08 PROCEDURE — 3078F DIAST BP <80 MM HG: CPT | Mod: CPTII,S$GLB,, | Performed by: INTERNAL MEDICINE

## 2023-03-08 PROCEDURE — 1157F PR ADVANCE CARE PLAN OR EQUIV PRESENT IN MEDICAL RECORD: ICD-10-PCS | Mod: CPTII,S$GLB,, | Performed by: INTERNAL MEDICINE

## 2023-03-08 PROCEDURE — 3288F FALL RISK ASSESSMENT DOCD: CPT | Mod: CPTII,S$GLB,, | Performed by: INTERNAL MEDICINE

## 2023-03-08 PROCEDURE — 1126F AMNT PAIN NOTED NONE PRSNT: CPT | Mod: CPTII,S$GLB,, | Performed by: INTERNAL MEDICINE

## 2023-03-08 PROCEDURE — 1159F PR MEDICATION LIST DOCUMENTED IN MEDICAL RECORD: ICD-10-PCS | Mod: CPTII,S$GLB,, | Performed by: INTERNAL MEDICINE

## 2023-03-08 PROCEDURE — 93005 ELECTROCARDIOGRAM TRACING: CPT | Mod: S$GLB,,, | Performed by: INTERNAL MEDICINE

## 2023-03-08 PROCEDURE — 99214 OFFICE O/P EST MOD 30 MIN: CPT | Mod: S$GLB,,, | Performed by: INTERNAL MEDICINE

## 2023-03-08 PROCEDURE — 93010 ELECTROCARDIOGRAM REPORT: CPT | Mod: S$GLB,,, | Performed by: INTERNAL MEDICINE

## 2023-03-08 PROCEDURE — 3074F SYST BP LT 130 MM HG: CPT | Mod: CPTII,S$GLB,, | Performed by: INTERNAL MEDICINE

## 2023-03-10 ENCOUNTER — OFFICE VISIT (OUTPATIENT)
Dept: OPTOMETRY | Facility: CLINIC | Age: 78
End: 2023-03-10
Payer: MEDICARE

## 2023-03-10 DIAGNOSIS — H04.123 DRY EYE SYNDROME, BILATERAL: Primary | ICD-10-CM

## 2023-03-10 DIAGNOSIS — H52.7 REFRACTIVE ERROR: ICD-10-CM

## 2023-03-10 PROCEDURE — 99999 PR PBB SHADOW E&M-EST. PATIENT-LVL I: ICD-10-PCS | Mod: PBBFAC,,, | Performed by: OPTOMETRIST

## 2023-03-10 PROCEDURE — 99213 OFFICE O/P EST LOW 20 MIN: CPT | Mod: S$GLB,,, | Performed by: OPTOMETRIST

## 2023-03-10 PROCEDURE — 1160F RVW MEDS BY RX/DR IN RCRD: CPT | Mod: CPTII,S$GLB,, | Performed by: OPTOMETRIST

## 2023-03-10 PROCEDURE — 1159F PR MEDICATION LIST DOCUMENTED IN MEDICAL RECORD: ICD-10-PCS | Mod: CPTII,S$GLB,, | Performed by: OPTOMETRIST

## 2023-03-10 PROCEDURE — 99213 PR OFFICE/OUTPT VISIT, EST, LEVL III, 20-29 MIN: ICD-10-PCS | Mod: S$GLB,,, | Performed by: OPTOMETRIST

## 2023-03-10 PROCEDURE — 99999 PR PBB SHADOW E&M-EST. PATIENT-LVL I: CPT | Mod: PBBFAC,,, | Performed by: OPTOMETRIST

## 2023-03-10 PROCEDURE — 1157F PR ADVANCE CARE PLAN OR EQUIV PRESENT IN MEDICAL RECORD: ICD-10-PCS | Mod: CPTII,S$GLB,, | Performed by: OPTOMETRIST

## 2023-03-10 PROCEDURE — 1159F MED LIST DOCD IN RCRD: CPT | Mod: CPTII,S$GLB,, | Performed by: OPTOMETRIST

## 2023-03-10 PROCEDURE — 1157F ADVNC CARE PLAN IN RCRD: CPT | Mod: CPTII,S$GLB,, | Performed by: OPTOMETRIST

## 2023-03-10 PROCEDURE — 1160F PR REVIEW ALL MEDS BY PRESCRIBER/CLIN PHARMACIST DOCUMENTED: ICD-10-PCS | Mod: CPTII,S$GLB,, | Performed by: OPTOMETRIST

## 2023-03-10 NOTE — PROGRESS NOTES
HPI    Pt here for blurred vision with near. Got new specs in November. Using   refresh tears a couple times a day  Last edited by Harley Bhatia, OD on 3/10/2023  3:55 PM.            Assessment /Plan     For exam results, see Encounter Report.    Dry eye syndrome, bilateral    Refractive error      1. Dry eye syndrome, bilateral  Discussed ocular affects -- diffuse SPK OU  Start otc Refresh Celluvisc q2-4 hours throughout day  Recheck in 1 month    2. Refractive error  20/40 with new specs, will treat dry eye and f/u in 1 month prn  Advised to wear new specs and not go back and forth between new and old      Continue f/u with Dr. Tna as directed

## 2023-03-15 ENCOUNTER — OFFICE VISIT (OUTPATIENT)
Dept: DERMATOLOGY | Facility: CLINIC | Age: 78
End: 2023-03-15
Payer: MEDICARE

## 2023-03-15 DIAGNOSIS — Z98.890 HISTORY OF MOHS MICROGRAPHIC SURGERY FOR SKIN CANCER: Primary | ICD-10-CM

## 2023-03-15 DIAGNOSIS — Z85.828 HISTORY OF MOHS MICROGRAPHIC SURGERY FOR SKIN CANCER: Primary | ICD-10-CM

## 2023-03-15 PROCEDURE — 1126F PR PAIN SEVERITY QUANTIFIED, NO PAIN PRESENT: ICD-10-PCS | Mod: CPTII,S$GLB,, | Performed by: DERMATOLOGY

## 2023-03-15 PROCEDURE — 1101F PT FALLS ASSESS-DOCD LE1/YR: CPT | Mod: CPTII,S$GLB,, | Performed by: DERMATOLOGY

## 2023-03-15 PROCEDURE — 1160F PR REVIEW ALL MEDS BY PRESCRIBER/CLIN PHARMACIST DOCUMENTED: ICD-10-PCS | Mod: CPTII,S$GLB,, | Performed by: DERMATOLOGY

## 2023-03-15 PROCEDURE — 1159F MED LIST DOCD IN RCRD: CPT | Mod: CPTII,S$GLB,, | Performed by: DERMATOLOGY

## 2023-03-15 PROCEDURE — 99999 PR PBB SHADOW E&M-EST. PATIENT-LVL III: ICD-10-PCS | Mod: PBBFAC,,, | Performed by: DERMATOLOGY

## 2023-03-15 PROCEDURE — 99999 PR PBB SHADOW E&M-EST. PATIENT-LVL III: CPT | Mod: PBBFAC,,, | Performed by: DERMATOLOGY

## 2023-03-15 PROCEDURE — 3288F PR FALLS RISK ASSESSMENT DOCUMENTED: ICD-10-PCS | Mod: CPTII,S$GLB,, | Performed by: DERMATOLOGY

## 2023-03-15 PROCEDURE — 1159F PR MEDICATION LIST DOCUMENTED IN MEDICAL RECORD: ICD-10-PCS | Mod: CPTII,S$GLB,, | Performed by: DERMATOLOGY

## 2023-03-15 PROCEDURE — 3288F FALL RISK ASSESSMENT DOCD: CPT | Mod: CPTII,S$GLB,, | Performed by: DERMATOLOGY

## 2023-03-15 PROCEDURE — 1157F PR ADVANCE CARE PLAN OR EQUIV PRESENT IN MEDICAL RECORD: ICD-10-PCS | Mod: CPTII,S$GLB,, | Performed by: DERMATOLOGY

## 2023-03-15 PROCEDURE — 1160F RVW MEDS BY RX/DR IN RCRD: CPT | Mod: CPTII,S$GLB,, | Performed by: DERMATOLOGY

## 2023-03-15 PROCEDURE — 1101F PR PT FALLS ASSESS DOC 0-1 FALLS W/OUT INJ PAST YR: ICD-10-PCS | Mod: CPTII,S$GLB,, | Performed by: DERMATOLOGY

## 2023-03-15 PROCEDURE — 1157F ADVNC CARE PLAN IN RCRD: CPT | Mod: CPTII,S$GLB,, | Performed by: DERMATOLOGY

## 2023-03-15 PROCEDURE — 99024 PR POST-OP FOLLOW-UP VISIT: ICD-10-PCS | Mod: S$GLB,,, | Performed by: DERMATOLOGY

## 2023-03-15 PROCEDURE — 1126F AMNT PAIN NOTED NONE PRSNT: CPT | Mod: CPTII,S$GLB,, | Performed by: DERMATOLOGY

## 2023-03-15 PROCEDURE — 99024 POSTOP FOLLOW-UP VISIT: CPT | Mod: S$GLB,,, | Performed by: DERMATOLOGY

## 2023-03-15 NOTE — PROGRESS NOTES
CC: 78 y.o.female patient is here for followup     HPI: Patient is 6-7 week(s) s/p Mohs' micrographic surgery, fresh tissue technique, of a basal cell carcinoma on the upper lip; with subsequent repair   Patient reports no problems    EXAM: Site appears well healed. Some residual erythema as anticipated.    Photo today:         IMPRESSION: Well healed scar post Mohs' micrographic surgery  Good cosmetic result    PLAN:  Discussed anticipated course  Followup to Dr. Jarrell in 2-3 months; prn to me

## 2023-03-17 ENCOUNTER — PATIENT MESSAGE (OUTPATIENT)
Dept: CARDIOLOGY | Facility: CLINIC | Age: 78
End: 2023-03-17
Payer: MEDICARE

## 2023-03-22 ENCOUNTER — OFFICE VISIT (OUTPATIENT)
Dept: PSYCHIATRY | Facility: CLINIC | Age: 78
End: 2023-03-22
Payer: MEDICARE

## 2023-03-22 VITALS
BODY MASS INDEX: 15.93 KG/M2 | WEIGHT: 101.5 LBS | HEIGHT: 67 IN | HEART RATE: 73 BPM | DIASTOLIC BLOOD PRESSURE: 71 MMHG | SYSTOLIC BLOOD PRESSURE: 117 MMHG | OXYGEN SATURATION: 94 %

## 2023-03-22 DIAGNOSIS — F41.1 GENERALIZED ANXIETY DISORDER: ICD-10-CM

## 2023-03-22 DIAGNOSIS — F40.298 SPECIFIC PHOBIA: ICD-10-CM

## 2023-03-22 DIAGNOSIS — F33.1 MAJOR DEPRESSIVE DISORDER, RECURRENT, MODERATE: Primary | ICD-10-CM

## 2023-03-22 DIAGNOSIS — F41.0 PANIC DISORDER: ICD-10-CM

## 2023-03-22 PROCEDURE — 1126F AMNT PAIN NOTED NONE PRSNT: CPT | Mod: CPTII,S$GLB,, | Performed by: PSYCHOLOGIST

## 2023-03-22 PROCEDURE — 1159F MED LIST DOCD IN RCRD: CPT | Mod: CPTII,S$GLB,, | Performed by: PSYCHOLOGIST

## 2023-03-22 PROCEDURE — 99999 PR PBB SHADOW E&M-EST. PATIENT-LVL III: ICD-10-PCS | Mod: PBBFAC,,, | Performed by: PSYCHOLOGIST

## 2023-03-22 PROCEDURE — 1157F ADVNC CARE PLAN IN RCRD: CPT | Mod: CPTII,S$GLB,, | Performed by: PSYCHOLOGIST

## 2023-03-22 PROCEDURE — 1101F PT FALLS ASSESS-DOCD LE1/YR: CPT | Mod: CPTII,S$GLB,, | Performed by: PSYCHOLOGIST

## 2023-03-22 PROCEDURE — 1126F PR PAIN SEVERITY QUANTIFIED, NO PAIN PRESENT: ICD-10-PCS | Mod: CPTII,S$GLB,, | Performed by: PSYCHOLOGIST

## 2023-03-22 PROCEDURE — 99214 OFFICE O/P EST MOD 30 MIN: CPT | Mod: S$GLB,,, | Performed by: PSYCHOLOGIST

## 2023-03-22 PROCEDURE — 90833 PSYTX W PT W E/M 30 MIN: CPT | Mod: S$GLB,,, | Performed by: PSYCHOLOGIST

## 2023-03-22 PROCEDURE — 3074F PR MOST RECENT SYSTOLIC BLOOD PRESSURE < 130 MM HG: ICD-10-PCS | Mod: CPTII,S$GLB,, | Performed by: PSYCHOLOGIST

## 2023-03-22 PROCEDURE — 1101F PR PT FALLS ASSESS DOC 0-1 FALLS W/OUT INJ PAST YR: ICD-10-PCS | Mod: CPTII,S$GLB,, | Performed by: PSYCHOLOGIST

## 2023-03-22 PROCEDURE — 3288F FALL RISK ASSESSMENT DOCD: CPT | Mod: CPTII,S$GLB,, | Performed by: PSYCHOLOGIST

## 2023-03-22 PROCEDURE — 3078F PR MOST RECENT DIASTOLIC BLOOD PRESSURE < 80 MM HG: ICD-10-PCS | Mod: CPTII,S$GLB,, | Performed by: PSYCHOLOGIST

## 2023-03-22 PROCEDURE — 1159F PR MEDICATION LIST DOCUMENTED IN MEDICAL RECORD: ICD-10-PCS | Mod: CPTII,S$GLB,, | Performed by: PSYCHOLOGIST

## 2023-03-22 PROCEDURE — 3074F SYST BP LT 130 MM HG: CPT | Mod: CPTII,S$GLB,, | Performed by: PSYCHOLOGIST

## 2023-03-22 PROCEDURE — 3078F DIAST BP <80 MM HG: CPT | Mod: CPTII,S$GLB,, | Performed by: PSYCHOLOGIST

## 2023-03-22 PROCEDURE — 90833 PR PSYCHOTHERAPY W/PATIENT W/E&M, 30 MIN (ADD ON): ICD-10-PCS | Mod: S$GLB,,, | Performed by: PSYCHOLOGIST

## 2023-03-22 PROCEDURE — 99214 PR OFFICE/OUTPT VISIT, EST, LEVL IV, 30-39 MIN: ICD-10-PCS | Mod: S$GLB,,, | Performed by: PSYCHOLOGIST

## 2023-03-22 PROCEDURE — 1157F PR ADVANCE CARE PLAN OR EQUIV PRESENT IN MEDICAL RECORD: ICD-10-PCS | Mod: CPTII,S$GLB,, | Performed by: PSYCHOLOGIST

## 2023-03-22 PROCEDURE — 3288F PR FALLS RISK ASSESSMENT DOCUMENTED: ICD-10-PCS | Mod: CPTII,S$GLB,, | Performed by: PSYCHOLOGIST

## 2023-03-22 PROCEDURE — 99999 PR PBB SHADOW E&M-EST. PATIENT-LVL III: CPT | Mod: PBBFAC,,, | Performed by: PSYCHOLOGIST

## 2023-03-22 RX ORDER — CHLORHEXIDINE GLUCONATE ORAL RINSE 1.2 MG/ML
SOLUTION DENTAL
COMMUNITY
Start: 2023-03-16 | End: 2023-06-16

## 2023-03-22 RX ORDER — AMITRIPTYLINE HYDROCHLORIDE 25 MG/1
37.5 TABLET, FILM COATED ORAL NIGHTLY
Qty: 45 TABLET | Refills: 1 | Status: CANCELLED | OUTPATIENT
Start: 2023-03-22

## 2023-03-22 RX ORDER — LORAZEPAM 0.5 MG/1
0.5 TABLET ORAL DAILY PRN
Qty: 30 TABLET | Refills: 0 | Status: CANCELLED | OUTPATIENT
Start: 2023-03-22

## 2023-03-22 NOTE — PROGRESS NOTES
Outpatient Psychiatry Follow-Up Visit    Clinical Status of Patient: Outpatient (Ambulatory)  03/22/2023     Chief Complaint: 76 y/o female presenting today with  for a follow-up.       Interval History and Content of Current Session:  Interim Events/Subjective Report/Content of Current Session: 76 y/o female follow-up appointment.    Pt is a 76 y/o female with past psychiatric hx of depression, anxiety, eating disorder who presents for follow-up treatment. Pt reported feeling a little more depressed recently. Pt stated that she felt like she was having a panic attack this morning. Stated that both legs and left side of her face was numb. Pt checked with [facility] who tested pacemaker and suggested no complications. Cardio suggested increased exercise. Pt reported enjoying the birthday party and spending time with her children and grandchildren. Daughter vocalized that she saw improvement in pt's cognition and responsiveness.     Past Psychiatric hx: remeron, rexulti, vraylar, Limbitrol, venlafaxine, fluoxetine, gabapentin, cannot remember others.     Past Medical hx:   Past Medical History:   Diagnosis Date    Anticoagulant long-term use     Anxiety     Arthritis     Atrial fibrillation     Cancer     skin    CHF (congestive heart failure)     Depression     DVT (deep venous thrombosis)     GERD (gastroesophageal reflux disease)     Glaucoma (increased eye pressure)     Hyperlipidemia     diet controlled    Hypertension     Interstitial lung disease     Localized hives 01/10/2020    Mild persistent asthma without complication 11/12/2018    Pacemaker     Pneumonia 01/31/2014    Stroke 06/03/2014    Stroke     TIA (transient ischemic attack)     TIA (transient ischemic attack)         Interim hx:  Medication changes last visit: None  Anxiety: decrease  Depression: decrease     Denies suicidal/homicidal ideations.  Denies hopelessness/worthlessness.    Denies auditory/visual hallucinations      Alcohol:  Infrequent use  Drug: Pt denied  Caffeine: Not assessed  Tobacco: Pt denied      Review of Systems   PSYCHIATRIC: Pertinent items are noted in the narrative.        CONSTITUTIONAL: weight stable    Past Medical, Family and Social History: The patient's past medical, family and social history have been reviewed and updated as appropriate within the electronic medical record. See encounter notes.     Current Psychiatric Medication:  ativan 0.5 mg qd PRN, buspirone 10 mg TID, trintillex 20 mg, amitriptyline 37.5 mg (ingredient in lumitrol)      Compliance: yes      Side effects: Pt denied     Risk Parameters:  Patient reports no suicidal ideation  Patient reports no homicidal ideation  Patient reports no self-injurious behavior  Patient reports no violent behavior     Exam (detailed: at least 9 elements; comprehensive: all 15 elements)   Constitutional  Vitals:  Most recent vital signs, dated less than 90 days prior to this appointment, were reviewed. Pulse:  [73]   BP: (117)/(71)   SpO2:  [94 %]       General:  unremarkable, age appropriate, casual attire, good eye contact, good rapport       Musculoskeletal  Muscle Strength/Tone:  no flaccidity, no tremor    Gait & Station:  normal      Psychiatric                       Speech:  normal tone, normal rate, rhythm, and volume   Mood & Affect:   Depressed, anxious         Thought Process:   Goal directed; Linear    Associations:   intact   Thought Content:   No SI/HI, delusions, or paranoia, no AV/VH   Insight & Judgement:   Good, adequate to circumstances   Orientation:   grossly intact; alert and oriented x 4    Memory:  intact for content of interview    Language:  grossly intact, can repeat    Attention Span  : Grossly intact for content of interview   Fund of Knowledge:   intact and appropriate to age and level of education        Assessment and Diagnosis   Status/Progress: Based on the examination today, the patient's problem(s) is/are adequately controlled.  New  problems have not been presented today. Co-morbidities are not complicating management of the primary condition. There are no active rule-out diagnoses for this patient at this time.      Impression: Pt continues to appear more verbally and nonverbally responsive after D/C of aripiprazole. Working on increasing calories and reduce fears associated with chewing/choking with therapist. Pt noted increased depression and will increase dose of trintillex. Will continue with medication plans as is and continue to consider low dose quetiapine or mirtazapine in the future to replace amitriptyline.      Diagnosis:   1) Major Depressive Disorder, recurrent, moderate  2) Generalized Anxiety Disorder  3) Specific Phobia  4) Panic Disorder   5) Personality Disorder traits  6) R/o Dementia of unknown etiology  Intervention/Counseling/Treatment Plan   Medication Management:      1. ativan 0.5 mg qd PRN    2. buspirone 10 mg TID    3. Increase trintillex to 20 mg    4. Amitriptyline 37.5 mg     5. Call to report any worsening of symptoms or problems with the medication. Pt instructed to go to ER with thoughts of harming self, others     6. Cont in therapy with Graeme Meyer LCSW    Psychotherapy: 20 minutes   Target symptoms: anxiety, weight loss  Why chosen therapy is appropriate versus another modality: CBT used; relevant to diagnosis, patient responds to this modality  Outcome monitoring methods: self-report, observation  Therapeutic intervention type: Cognitive Behavioral Therapy  Topics discussed/themes: building skills sets for symptom management, symptom recognition, nutrition, exercise  The patient's response to the intervention is accepting  Patient's response to treatment is: good.   The patient's progress toward treatment goals: limited to fair     Return to clinic: 1 month    -Cognitive-Behavioral/Supportive therapy and psychoeducation provided  -R/B/SE's of medications discussed with the pt who expresses understanding  and chooses to take medications as prescribed.   -Pt instructed to call clinic, 911 or go to nearest emergency room if sxs worsen or pt is in   crisis. The pt expresses understanding.    Galo Lipscomb, PhD, MP     Antidepressant/Antianxiety Medication Initiation:  Patient informed of risks, benefits, and potential side effects of medication and accepts informed consent.  Common side effects include nausea, fatigue, headache, insomnia., Specifically discussed the possibility of new or worsening suicidal thoughts/depression.  Patient instructed to stop the medication immediately and seek urgent treatment if this occurs. Patient instructed not to abruptly discontinue medication without physician guidance except in cases of sudden onset or worsening of SI.       Stimulant Medication Initiation:  Patient advised of risks, benefits, and side effects of medication and accepts informed consent.  Common side effects include insomnia, irritability, jittery feeling, dry mouth, and agitation/hostility., Patient advised of potential addictive nature of medication and controlled substance classification.  Instructed to safeguard medication as no early refills can be given for lost or stolen medications.       Benzodiazepine Initiation:  Patient advised of the risks, benefits, and common side effects of medication and has accepted informed consent.  Common side effects include drowsiness, impaired coordination, possible memory loss., Patient advised NOT to operate a vehicle or machinery untiil they are sure how the medication will affec them.  Client also advised of danger of mixing this medication with alcohol., Patient advised of potential addictive nature of medication and need to safeguard medication as no early refills for lost or stolen medications can be authorized.       Pregnancy Warning:  Patient denies current pregnancy possibility.  Patient made aware that medications have not been proven safe in pregnancy and that  she must maintain adequate birth control.  Patient instructed to alert us immediately if she becomes pregnant.       Sleep Aid Initiation:  Patient advised of potential side effects of medication including sleep walking or other complex behaviors.  Patient advised not to mix medication with alcohol, to go to bed immediately after taking, and to stop at first sign of any unusual behaviors.

## 2023-03-29 RX ORDER — BUSPIRONE HYDROCHLORIDE 10 MG/1
10 TABLET ORAL 3 TIMES DAILY
Qty: 90 TABLET | Refills: 1 | Status: SHIPPED | OUTPATIENT
Start: 2023-03-29 | End: 2023-05-03 | Stop reason: SDUPTHER

## 2023-03-29 RX ORDER — VORTIOXETINE 10 MG/1
1 TABLET, FILM COATED ORAL NIGHTLY
Qty: 90 TABLET | Refills: 1 | Status: SHIPPED | OUTPATIENT
Start: 2023-03-29 | End: 2023-03-29

## 2023-03-29 RX ORDER — VORTIOXETINE 20 MG/1
20 TABLET, FILM COATED ORAL DAILY
Qty: 30 TABLET | Refills: 3 | Status: SHIPPED | OUTPATIENT
Start: 2023-03-29 | End: 2023-08-17 | Stop reason: SDUPTHER

## 2023-04-03 ENCOUNTER — PATIENT MESSAGE (OUTPATIENT)
Dept: PSYCHIATRY | Facility: CLINIC | Age: 78
End: 2023-04-03
Payer: MEDICARE

## 2023-04-04 ENCOUNTER — LAB VISIT (OUTPATIENT)
Dept: LAB | Facility: HOSPITAL | Age: 78
End: 2023-04-04
Payer: MEDICARE

## 2023-04-04 DIAGNOSIS — R74.8 ELEVATED LIVER ENZYMES: ICD-10-CM

## 2023-04-04 LAB
ALBUMIN SERPL BCP-MCNC: 3.7 G/DL (ref 3.5–5.2)
ALP SERPL-CCNC: 88 U/L (ref 55–135)
ALT SERPL W/O P-5'-P-CCNC: 18 U/L (ref 10–44)
AST SERPL-CCNC: 27 U/L (ref 10–40)
BILIRUB DIRECT SERPL-MCNC: 0.3 MG/DL (ref 0.1–0.3)
BILIRUB SERPL-MCNC: 1 MG/DL (ref 0.1–1)
HAV IGM SERPL QL IA: NORMAL
HBV CORE IGM SERPL QL IA: NORMAL
HBV SURFACE AG SERPL QL IA: NORMAL
HCV AB SERPL QL IA: NORMAL
PROT SERPL-MCNC: 6.5 G/DL (ref 6–8.4)

## 2023-04-04 PROCEDURE — 80076 HEPATIC FUNCTION PANEL: CPT | Performed by: FAMILY MEDICINE

## 2023-04-04 PROCEDURE — 36415 COLL VENOUS BLD VENIPUNCTURE: CPT | Mod: PO | Performed by: FAMILY MEDICINE

## 2023-04-04 PROCEDURE — 80074 ACUTE HEPATITIS PANEL: CPT | Performed by: FAMILY MEDICINE

## 2023-04-05 ENCOUNTER — TELEPHONE (OUTPATIENT)
Dept: FAMILY MEDICINE | Facility: CLINIC | Age: 78
End: 2023-04-05
Payer: MEDICARE

## 2023-04-05 ENCOUNTER — PATIENT MESSAGE (OUTPATIENT)
Dept: FAMILY MEDICINE | Facility: CLINIC | Age: 78
End: 2023-04-05
Payer: MEDICARE

## 2023-04-05 DIAGNOSIS — R73.03 PREDIABETES: ICD-10-CM

## 2023-04-05 DIAGNOSIS — R74.01 TRANSAMINASEMIA: ICD-10-CM

## 2023-04-05 DIAGNOSIS — E78.2 MIXED HYPERLIPIDEMIA: Primary | ICD-10-CM

## 2023-04-06 ENCOUNTER — PATIENT MESSAGE (OUTPATIENT)
Dept: FAMILY MEDICINE | Facility: CLINIC | Age: 78
End: 2023-04-06
Payer: MEDICARE

## 2023-04-07 ENCOUNTER — CLINICAL SUPPORT (OUTPATIENT)
Dept: CARDIOLOGY | Facility: HOSPITAL | Age: 78
End: 2023-04-07
Payer: MEDICARE

## 2023-04-07 DIAGNOSIS — I49.5 SICK SINUS SYNDROME: ICD-10-CM

## 2023-04-07 DIAGNOSIS — I48.91 UNSPECIFIED ATRIAL FIBRILLATION: ICD-10-CM

## 2023-04-07 DIAGNOSIS — Z95.0 PRESENCE OF CARDIAC PACEMAKER: ICD-10-CM

## 2023-04-07 PROCEDURE — 93296 REM INTERROG EVL PM/IDS: CPT | Performed by: INTERNAL MEDICINE

## 2023-04-10 ENCOUNTER — PATIENT MESSAGE (OUTPATIENT)
Dept: CARDIOLOGY | Facility: CLINIC | Age: 78
End: 2023-04-10
Payer: MEDICARE

## 2023-04-10 ENCOUNTER — PATIENT MESSAGE (OUTPATIENT)
Dept: FAMILY MEDICINE | Facility: CLINIC | Age: 78
End: 2023-04-10
Payer: MEDICARE

## 2023-04-10 NOTE — TELEPHONE ENCOUNTER
The symptoms do sound more like neuropathy but poor circulation can be a cause of neuropathy.  So can her BusPar and the problem may have shown up as the dose was increased.  I would suggest we reduce the BusPar from 10 mg 3 times a day to 10 mg twice a day and see if it helps.  If not will drop it down to 5 mg twice a day.  If that does not help I would suggest we do some circulation testing.  Other causes of neuropathy our genetic/familial and toxic affects of medications, frequently chemotherapy.  Diabetes, lead poisoning, and numerous other causes are out there.    Patient notified by e-mail

## 2023-04-26 ENCOUNTER — PATIENT MESSAGE (OUTPATIENT)
Dept: FAMILY MEDICINE | Facility: CLINIC | Age: 78
End: 2023-04-26
Payer: MEDICARE

## 2023-04-26 DIAGNOSIS — M79.605 LOWER EXTREMITY PAIN, BILATERAL: Primary | ICD-10-CM

## 2023-04-26 DIAGNOSIS — M79.604 LOWER EXTREMITY PAIN, BILATERAL: Primary | ICD-10-CM

## 2023-04-26 NOTE — TELEPHONE ENCOUNTER
We can do an ultrasound to check the arterial flow in her legs and see if there is some impairment that may cause neuropathy.  Also I was looking through her recent lab work and her red blood cells are slightly larger than average.  That can be a sign of a B12 or folic acid deficiency and deficiencies of those vitamins may also cause neuropathy.  We can check levels of B12, folate, and also thiamin to see if we have a correctable vitamin deficiency causing the tingling.

## 2023-04-27 NOTE — TELEPHONE ENCOUNTER
Call placed to patient's  (Jan) for notification. Ultrasound appointment scheduled for the date of 5-4-23. Mr. Montoya agreed to appointment date, time, and location.

## 2023-05-03 ENCOUNTER — OFFICE VISIT (OUTPATIENT)
Dept: PSYCHIATRY | Facility: CLINIC | Age: 78
End: 2023-05-03
Payer: MEDICARE

## 2023-05-03 VITALS
HEART RATE: 60 BPM | SYSTOLIC BLOOD PRESSURE: 118 MMHG | BODY MASS INDEX: 16.07 KG/M2 | DIASTOLIC BLOOD PRESSURE: 67 MMHG | HEIGHT: 67 IN | WEIGHT: 102.38 LBS

## 2023-05-03 DIAGNOSIS — F33.1 MAJOR DEPRESSIVE DISORDER, RECURRENT, MODERATE: Primary | ICD-10-CM

## 2023-05-03 DIAGNOSIS — F40.298 SPECIFIC PHOBIA: ICD-10-CM

## 2023-05-03 DIAGNOSIS — F41.1 GENERALIZED ANXIETY DISORDER: ICD-10-CM

## 2023-05-03 DIAGNOSIS — F41.0 PANIC DISORDER: ICD-10-CM

## 2023-05-03 PROCEDURE — 99999 PR PBB SHADOW E&M-EST. PATIENT-LVL III: ICD-10-PCS | Mod: PBBFAC,,, | Performed by: PSYCHOLOGIST

## 2023-05-03 PROCEDURE — 3288F PR FALLS RISK ASSESSMENT DOCUMENTED: ICD-10-PCS | Mod: CPTII,S$GLB,, | Performed by: PSYCHOLOGIST

## 2023-05-03 PROCEDURE — 1157F PR ADVANCE CARE PLAN OR EQUIV PRESENT IN MEDICAL RECORD: ICD-10-PCS | Mod: CPTII,S$GLB,, | Performed by: PSYCHOLOGIST

## 2023-05-03 PROCEDURE — 99214 OFFICE O/P EST MOD 30 MIN: CPT | Mod: S$GLB,,, | Performed by: PSYCHOLOGIST

## 2023-05-03 PROCEDURE — 1126F PR PAIN SEVERITY QUANTIFIED, NO PAIN PRESENT: ICD-10-PCS | Mod: CPTII,S$GLB,, | Performed by: PSYCHOLOGIST

## 2023-05-03 PROCEDURE — 99999 PR PBB SHADOW E&M-EST. PATIENT-LVL III: CPT | Mod: PBBFAC,,, | Performed by: PSYCHOLOGIST

## 2023-05-03 PROCEDURE — 3074F PR MOST RECENT SYSTOLIC BLOOD PRESSURE < 130 MM HG: ICD-10-PCS | Mod: CPTII,S$GLB,, | Performed by: PSYCHOLOGIST

## 2023-05-03 PROCEDURE — 1126F AMNT PAIN NOTED NONE PRSNT: CPT | Mod: CPTII,S$GLB,, | Performed by: PSYCHOLOGIST

## 2023-05-03 PROCEDURE — 1159F PR MEDICATION LIST DOCUMENTED IN MEDICAL RECORD: ICD-10-PCS | Mod: CPTII,S$GLB,, | Performed by: PSYCHOLOGIST

## 2023-05-03 PROCEDURE — 90833 PR PSYCHOTHERAPY W/PATIENT W/E&M, 30 MIN (ADD ON): ICD-10-PCS | Mod: S$GLB,,, | Performed by: PSYCHOLOGIST

## 2023-05-03 PROCEDURE — 3078F PR MOST RECENT DIASTOLIC BLOOD PRESSURE < 80 MM HG: ICD-10-PCS | Mod: CPTII,S$GLB,, | Performed by: PSYCHOLOGIST

## 2023-05-03 PROCEDURE — 1101F PR PT FALLS ASSESS DOC 0-1 FALLS W/OUT INJ PAST YR: ICD-10-PCS | Mod: CPTII,S$GLB,, | Performed by: PSYCHOLOGIST

## 2023-05-03 PROCEDURE — 90833 PSYTX W PT W E/M 30 MIN: CPT | Mod: S$GLB,,, | Performed by: PSYCHOLOGIST

## 2023-05-03 PROCEDURE — 1159F MED LIST DOCD IN RCRD: CPT | Mod: CPTII,S$GLB,, | Performed by: PSYCHOLOGIST

## 2023-05-03 PROCEDURE — 1157F ADVNC CARE PLAN IN RCRD: CPT | Mod: CPTII,S$GLB,, | Performed by: PSYCHOLOGIST

## 2023-05-03 PROCEDURE — 3288F FALL RISK ASSESSMENT DOCD: CPT | Mod: CPTII,S$GLB,, | Performed by: PSYCHOLOGIST

## 2023-05-03 PROCEDURE — 1101F PT FALLS ASSESS-DOCD LE1/YR: CPT | Mod: CPTII,S$GLB,, | Performed by: PSYCHOLOGIST

## 2023-05-03 PROCEDURE — 3074F SYST BP LT 130 MM HG: CPT | Mod: CPTII,S$GLB,, | Performed by: PSYCHOLOGIST

## 2023-05-03 PROCEDURE — 99214 PR OFFICE/OUTPT VISIT, EST, LEVL IV, 30-39 MIN: ICD-10-PCS | Mod: S$GLB,,, | Performed by: PSYCHOLOGIST

## 2023-05-03 PROCEDURE — 3078F DIAST BP <80 MM HG: CPT | Mod: CPTII,S$GLB,, | Performed by: PSYCHOLOGIST

## 2023-05-03 RX ORDER — AMITRIPTYLINE HYDROCHLORIDE 25 MG/1
37.5 TABLET, FILM COATED ORAL NIGHTLY
Qty: 45 TABLET | Refills: 1 | Status: SHIPPED | OUTPATIENT
Start: 2023-05-03 | End: 2023-08-17

## 2023-05-03 RX ORDER — LORAZEPAM 0.5 MG/1
0.5 TABLET ORAL 2 TIMES DAILY PRN
Qty: 60 TABLET | Refills: 0 | Status: SHIPPED | OUTPATIENT
Start: 2023-05-03 | End: 2023-07-05 | Stop reason: SDUPTHER

## 2023-05-03 RX ORDER — BUSPIRONE HYDROCHLORIDE 10 MG/1
10 TABLET ORAL 3 TIMES DAILY
Qty: 90 TABLET | Refills: 3 | Status: SHIPPED | OUTPATIENT
Start: 2023-05-03 | End: 2023-08-01

## 2023-05-03 NOTE — PROGRESS NOTES
Outpatient Psychiatry Follow-Up Visit    Clinical Status of Patient: Outpatient (Ambulatory)  05/03/2023     Chief Complaint: 78 y/o female presenting today with  for a follow-up.       Interval History and Content of Current Session:  Interim Events/Subjective Report/Content of Current Session: 78 y/o female follow-up appointment.    Pt is a 78 y/o female with past psychiatric hx of depression, anxiety, eating disorder who presents for follow-up treatment. Pt's  reported continued progress with cognitive abilities, conversation tracking, and communication. Pt has been more willing to try more foods and has been gaining weight. Also noted drinking more water. Pt reported that she continues to take the ativan most mornings as she often wakes up with anxiety. Pt did note a tingly sensation in her legs and will have an ultrasound tomorrow.     Past Psychiatric hx: remeron, rexulti, vraylar, Limbitrol, venlafaxine, fluoxetine, gabapentin, cannot remember others.     Past Medical hx:   Past Medical History:   Diagnosis Date    Anticoagulant long-term use     Anxiety     Arthritis     Atrial fibrillation     Cancer     skin    CHF (congestive heart failure)     Depression     DVT (deep venous thrombosis)     GERD (gastroesophageal reflux disease)     Glaucoma (increased eye pressure)     Hyperlipidemia     diet controlled    Hypertension     Interstitial lung disease     Localized hives 01/10/2020    Mild persistent asthma without complication 11/12/2018    Pacemaker     Pneumonia 01/31/2014    Stroke 06/03/2014    Stroke     TIA (transient ischemic attack)     TIA (transient ischemic attack)         Interim hx:  Medication changes last visit: None  Anxiety: decrease  Depression: decrease     Denies suicidal/homicidal ideations.  Denies hopelessness/worthlessness.    Denies auditory/visual hallucinations      Alcohol: Infrequent use  Drug: Pt denied  Caffeine: Not assessed  Tobacco: Pt denied      Review of  Systems   PSYCHIATRIC: Pertinent items are noted in the narrative.        CONSTITUTIONAL: weight stable    Past Medical, Family and Social History: The patient's past medical, family and social history have been reviewed and updated as appropriate within the electronic medical record. See encounter notes.     Current Psychiatric Medication:  ativan 0.5 mg qd PRN, buspirone 10 mg TID, trintillex 20 mg, amitriptyline 37.5 mg (ingredient in lumitrol)      Compliance: yes      Side effects: Pt denied     Risk Parameters:  Patient reports no suicidal ideation  Patient reports no homicidal ideation  Patient reports no self-injurious behavior  Patient reports no violent behavior     Exam (detailed: at least 9 elements; comprehensive: all 15 elements)   Constitutional  Vitals:  Most recent vital signs, dated less than 90 days prior to this appointment, were reviewed. Pulse:  [60]   BP: (118)/(67)       General:  unremarkable, age appropriate, casual attire, good eye contact, good rapport       Musculoskeletal  Muscle Strength/Tone:  no flaccidity, no tremor    Gait & Station:  normal      Psychiatric                       Speech:  normal tone, normal rate, rhythm, and volume   Mood & Affect:   Depressed, anxious         Thought Process:   Goal directed; Linear    Associations:   intact   Thought Content:   No SI/HI, delusions, or paranoia, no AV/VH   Insight & Judgement:   Good, adequate to circumstances   Orientation:   grossly intact; alert and oriented x 4    Memory:  intact for content of interview    Language:  grossly intact, can repeat    Attention Span  : Grossly intact for content of interview   Fund of Knowledge:   intact and appropriate to age and level of education        Assessment and Diagnosis   Status/Progress: Based on the examination today, the patient's problem(s) is/are adequately controlled.  New problems have not been presented today. Co-morbidities are not complicating management of the primary  condition. There are no active rule-out diagnoses for this patient at this time.      Impression: Pt continues to improve. Discussed a trial of reducing amitriptyline to 25 mg and evaluate response. Will continue with medication plans as is and continue to consider low dose quetiapine or mirtazapine in the future to replace amitriptyline.      Diagnosis:   1) Major Depressive Disorder, recurrent, moderate  2) Generalized Anxiety Disorder  3) Specific Phobia  4) Panic Disorder   5) Personality Disorder traits  6) R/o Dementia of unknown etiology  Intervention/Counseling/Treatment Plan   Medication Management:      1. ativan 0.5 mg qd PRN    2. buspirone 10 mg TID    3. trintillex to 20 mg    4. Amitriptyline 37.5 mg     5. Call to report any worsening of symptoms or problems with the medication. Pt instructed to go to ER with thoughts of harming self, others     6. Cont in therapy with Graeme Meyer LCSW    Psychotherapy: 20 minutes   Target symptoms: anxiety, weight loss  Why chosen therapy is appropriate versus another modality: CBT used; relevant to diagnosis, patient responds to this modality  Outcome monitoring methods: self-report, observation  Therapeutic intervention type: Cognitive Behavioral Therapy  Topics discussed/themes: building skills sets for symptom management, symptom recognition, nutrition, exercise  The patient's response to the intervention is accepting  Patient's response to treatment is: good.   The patient's progress toward treatment goals: limited to fair     Return to clinic: 1 month    -Cognitive-Behavioral/Supportive therapy and psychoeducation provided  -R/B/SE's of medications discussed with the pt who expresses understanding and chooses to take medications as prescribed.   -Pt instructed to call clinic, 911 or go to nearest emergency room if sxs worsen or pt is in   crisis. The pt expresses understanding.    Galo Lipscomb, PhD, MP     Antidepressant/Antianxiety Medication Initiation:   Patient informed of risks, benefits, and potential side effects of medication and accepts informed consent.  Common side effects include nausea, fatigue, headache, insomnia., Specifically discussed the possibility of new or worsening suicidal thoughts/depression.  Patient instructed to stop the medication immediately and seek urgent treatment if this occurs. Patient instructed not to abruptly discontinue medication without physician guidance except in cases of sudden onset or worsening of SI.       Stimulant Medication Initiation:  Patient advised of risks, benefits, and side effects of medication and accepts informed consent.  Common side effects include insomnia, irritability, jittery feeling, dry mouth, and agitation/hostility., Patient advised of potential addictive nature of medication and controlled substance classification.  Instructed to safeguard medication as no early refills can be given for lost or stolen medications.       Benzodiazepine Initiation:  Patient advised of the risks, benefits, and common side effects of medication and has accepted informed consent.  Common side effects include drowsiness, impaired coordination, possible memory loss., Patient advised NOT to operate a vehicle or machinery untiil they are sure how the medication will affec them.  Client also advised of danger of mixing this medication with alcohol., Patient advised of potential addictive nature of medication and need to safeguard medication as no early refills for lost or stolen medications can be authorized.

## 2023-05-04 ENCOUNTER — HOSPITAL ENCOUNTER (OUTPATIENT)
Dept: RADIOLOGY | Facility: HOSPITAL | Age: 78
Discharge: HOME | End: 2023-05-04
Attending: FAMILY MEDICINE
Payer: MEDICARE

## 2023-05-04 ENCOUNTER — PATIENT MESSAGE (OUTPATIENT)
Dept: FAMILY MEDICINE | Facility: CLINIC | Age: 78
End: 2023-05-04
Payer: MEDICARE

## 2023-05-04 DIAGNOSIS — M79.604 LOWER EXTREMITY PAIN, BILATERAL: ICD-10-CM

## 2023-05-04 DIAGNOSIS — M79.605 LOWER EXTREMITY PAIN, BILATERAL: ICD-10-CM

## 2023-05-04 PROCEDURE — 93925 LOWER EXTREMITY STUDY: CPT | Mod: 26,,, | Performed by: RADIOLOGY

## 2023-05-04 PROCEDURE — 93922 US ARTERIAL LOWER EXTREMITY BILAT WITH ABI (XPD): ICD-10-PCS | Mod: 26,,, | Performed by: RADIOLOGY

## 2023-05-04 PROCEDURE — 93922 UPR/L XTREMITY ART 2 LEVELS: CPT | Mod: 26,,, | Performed by: RADIOLOGY

## 2023-05-04 PROCEDURE — 93925 LOWER EXTREMITY STUDY: CPT | Mod: TC

## 2023-05-04 PROCEDURE — 93925 US ARTERIAL LOWER EXTREMITY BILAT WITH ABI (XPD): ICD-10-PCS | Mod: 26,,, | Performed by: RADIOLOGY

## 2023-05-04 NOTE — TELEPHONE ENCOUNTER
The circulation in the right leg on ultrasound is normal.  There is a borderline blockage on the left probably not significant enough to cause problems.  If she has substantially more tingling in the left leg I would be more suspicious but if they are close to even I would say this does not have anything to do with it.    We can try a medication for neuropathy, usually I use gabapentin and she has that on her allergy list associated with hallucinations so we do not want to do that.  Pregabalin or Lyrica is an alternative but it very well could have the same side effects as they are closely related.  In addition, she is on a number of medications that can cause sedation and that would lead to a high likelihood of side effects and interactions with anything we use for neuropathy.  The amitriptyline she is taking is actually one of the medications we use for neuropathy.  If she could tolerate a slightly higher dose such as 50 mg a day it might be helpful.    Copy of this message was sent directly to patient

## 2023-05-10 ENCOUNTER — PATIENT MESSAGE (OUTPATIENT)
Dept: OPHTHALMOLOGY | Facility: CLINIC | Age: 78
End: 2023-05-10
Payer: MEDICARE

## 2023-05-10 ENCOUNTER — PATIENT MESSAGE (OUTPATIENT)
Dept: PSYCHIATRY | Facility: CLINIC | Age: 78
End: 2023-05-10
Payer: MEDICARE

## 2023-05-10 NOTE — TELEPHONE ENCOUNTER
[facility] results have been uploaded. It is located under media tab - outside lab genetic test.

## 2023-05-11 RX ORDER — DORZOLAMIDE HYDROCHLORIDE AND TIMOLOL MALEATE 20; 5 MG/ML; MG/ML
1 SOLUTION/ DROPS OPHTHALMIC 3 TIMES DAILY
Qty: 20 ML | Refills: 11 | Status: SHIPPED | OUTPATIENT
Start: 2023-05-11 | End: 2023-12-08 | Stop reason: ALTCHOICE

## 2023-05-18 ENCOUNTER — PATIENT MESSAGE (OUTPATIENT)
Dept: OPHTHALMOLOGY | Facility: CLINIC | Age: 78
End: 2023-05-18
Payer: MEDICARE

## 2023-05-22 ENCOUNTER — PATIENT MESSAGE (OUTPATIENT)
Dept: OPHTHALMOLOGY | Facility: CLINIC | Age: 78
End: 2023-05-22
Payer: MEDICARE

## 2023-06-08 ENCOUNTER — PATIENT MESSAGE (OUTPATIENT)
Dept: DERMATOLOGY | Facility: CLINIC | Age: 78
End: 2023-06-08
Payer: MEDICARE

## 2023-06-16 ENCOUNTER — OFFICE VISIT (OUTPATIENT)
Dept: CARDIOLOGY | Facility: CLINIC | Age: 78
End: 2023-06-16
Payer: MEDICARE

## 2023-06-16 VITALS
WEIGHT: 102.81 LBS | BODY MASS INDEX: 16.13 KG/M2 | OXYGEN SATURATION: 100 % | DIASTOLIC BLOOD PRESSURE: 57 MMHG | SYSTOLIC BLOOD PRESSURE: 100 MMHG | HEIGHT: 67 IN | HEART RATE: 62 BPM

## 2023-06-16 DIAGNOSIS — Z95.0 CARDIAC PACEMAKER IN SITU: ICD-10-CM

## 2023-06-16 DIAGNOSIS — F13.20 BENZODIAZEPINE DEPENDENCE: ICD-10-CM

## 2023-06-16 DIAGNOSIS — I11.9 HYPERTENSIVE LEFT VENTRICULAR HYPERTROPHY, WITHOUT HEART FAILURE: ICD-10-CM

## 2023-06-16 DIAGNOSIS — I48.0 PAROXYSMAL ATRIAL FIBRILLATION: ICD-10-CM

## 2023-06-16 DIAGNOSIS — Z91.89 SEDENTARY LIFESTYLE: ICD-10-CM

## 2023-06-16 DIAGNOSIS — I50.32 CHRONIC DIASTOLIC HEART FAILURE: Primary | ICD-10-CM

## 2023-06-16 DIAGNOSIS — E78.00 PURE HYPERCHOLESTEROLEMIA: ICD-10-CM

## 2023-06-16 DIAGNOSIS — E86.0 DEHYDRATION, MODERATE: ICD-10-CM

## 2023-06-16 DIAGNOSIS — R54 FRAIL ELDERLY: ICD-10-CM

## 2023-06-16 DIAGNOSIS — I27.20 PULMONARY HYPERTENSION: ICD-10-CM

## 2023-06-16 DIAGNOSIS — R06.09 DOE (DYSPNEA ON EXERTION): ICD-10-CM

## 2023-06-16 DIAGNOSIS — Z86.73 H/O: CVA (CEREBROVASCULAR ACCIDENT): ICD-10-CM

## 2023-06-16 PROCEDURE — 1159F PR MEDICATION LIST DOCUMENTED IN MEDICAL RECORD: ICD-10-PCS | Mod: CPTII,S$GLB,, | Performed by: INTERNAL MEDICINE

## 2023-06-16 PROCEDURE — 3074F SYST BP LT 130 MM HG: CPT | Mod: CPTII,S$GLB,, | Performed by: INTERNAL MEDICINE

## 2023-06-16 PROCEDURE — 1157F ADVNC CARE PLAN IN RCRD: CPT | Mod: CPTII,S$GLB,, | Performed by: INTERNAL MEDICINE

## 2023-06-16 PROCEDURE — 3074F PR MOST RECENT SYSTOLIC BLOOD PRESSURE < 130 MM HG: ICD-10-PCS | Mod: CPTII,S$GLB,, | Performed by: INTERNAL MEDICINE

## 2023-06-16 PROCEDURE — 1101F PR PT FALLS ASSESS DOC 0-1 FALLS W/OUT INJ PAST YR: ICD-10-PCS | Mod: CPTII,S$GLB,, | Performed by: INTERNAL MEDICINE

## 2023-06-16 PROCEDURE — 1159F MED LIST DOCD IN RCRD: CPT | Mod: CPTII,S$GLB,, | Performed by: INTERNAL MEDICINE

## 2023-06-16 PROCEDURE — 93000 EKG 12-LEAD: ICD-10-PCS | Mod: S$GLB,,, | Performed by: INTERNAL MEDICINE

## 2023-06-16 PROCEDURE — 1101F PT FALLS ASSESS-DOCD LE1/YR: CPT | Mod: CPTII,S$GLB,, | Performed by: INTERNAL MEDICINE

## 2023-06-16 PROCEDURE — 93000 ELECTROCARDIOGRAM COMPLETE: CPT | Mod: S$GLB,,, | Performed by: INTERNAL MEDICINE

## 2023-06-16 PROCEDURE — 3078F PR MOST RECENT DIASTOLIC BLOOD PRESSURE < 80 MM HG: ICD-10-PCS | Mod: CPTII,S$GLB,, | Performed by: INTERNAL MEDICINE

## 2023-06-16 PROCEDURE — 1157F PR ADVANCE CARE PLAN OR EQUIV PRESENT IN MEDICAL RECORD: ICD-10-PCS | Mod: CPTII,S$GLB,, | Performed by: INTERNAL MEDICINE

## 2023-06-16 PROCEDURE — 3288F FALL RISK ASSESSMENT DOCD: CPT | Mod: CPTII,S$GLB,, | Performed by: INTERNAL MEDICINE

## 2023-06-16 PROCEDURE — 99214 PR OFFICE/OUTPT VISIT, EST, LEVL IV, 30-39 MIN: ICD-10-PCS | Mod: 25,S$GLB,, | Performed by: INTERNAL MEDICINE

## 2023-06-16 PROCEDURE — 3288F PR FALLS RISK ASSESSMENT DOCUMENTED: ICD-10-PCS | Mod: CPTII,S$GLB,, | Performed by: INTERNAL MEDICINE

## 2023-06-16 PROCEDURE — 3078F DIAST BP <80 MM HG: CPT | Mod: CPTII,S$GLB,, | Performed by: INTERNAL MEDICINE

## 2023-06-16 PROCEDURE — 99999 PR PBB SHADOW E&M-EST. PATIENT-LVL III: ICD-10-PCS | Mod: PBBFAC,,, | Performed by: INTERNAL MEDICINE

## 2023-06-16 PROCEDURE — 99999 PR PBB SHADOW E&M-EST. PATIENT-LVL III: CPT | Mod: PBBFAC,,, | Performed by: INTERNAL MEDICINE

## 2023-06-16 PROCEDURE — 99214 OFFICE O/P EST MOD 30 MIN: CPT | Mod: 25,S$GLB,, | Performed by: INTERNAL MEDICINE

## 2023-06-16 NOTE — PROGRESS NOTES
Subjective:    Patient ID:  Ayla Dela Cruz is a 78 y.o. female who presents for evaluation of Follow-up (6 months. 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 Significant weight loss due to major depression off Limbitrol (stopped 2010), diet controlled T2DM. 6-months follow up.  PCP: Dr. Olivo, see annually, do labs  Opthalmologist: Daniel Tan MD  EP: Dr. Calderon, last seen 3/2023  Orthopedic: Dr. Aguero  Surgeon: Dr. Gonsalez, and Dr. Dc  Rheumatologist: Dr. Pak  Prior cardiologist: Dr. Gibson, last seen over a year  Pulmonary: Marichuy Mcelroy NP  Psychologist: Galo Lipscomb, PhD, MP  Gyn: Dr. Jordan  GI: Dr. Schultz  Neurologist: Dr. Ramirez  Dermatologist: Dennis Corrigan  Pain: Dr. 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  Diet: regular  Caffeine: none  Labs: 1/2019 LDL 90.6 on Crestor 40 mg, normal TSH, CMP (albumin 3.1), normal CBC, Vit. D  10/2019 AST 95, Hgb 11.4  Lab Results   Component Value Date    TSH 1.194 09/20/2022        Lab Results   Component Value Date    HGBA1C 5.6 03/01/2023       Normal H&H in 11/15/2021  Lab Results   Component Value Date    WBC 6.84 03/01/2023    HGB 13.1 03/01/2023    HCT 40.3 03/01/2023    MCV 99 (H) 03/01/2023      03/01/2023       CMP  Sodium   Date Value Ref Range Status   03/01/2023 140 136 - 145 mmol/L Final     Potassium   Date Value Ref Range Status   03/01/2023 4.3 3.5 - 5.1 mmol/L Final     Chloride   Date Value Ref Range Status   03/01/2023 107 95 - 110 mmol/L Final     CO2   Date Value Ref Range Status   03/01/2023 24 23 - 29 mmol/L Final     Glucose   Date Value Ref Range Status   03/01/2023 86 70 - 110 mg/dL Final     BUN   Date Value Ref Range Status   03/01/2023 22 8 - 23 mg/dL Final     Creatinine   Date Value Ref Range Status   03/01/2023 0.8 0.5 - 1.4 mg/dL Final   09/24/2012 0.6 0.2 - 1.4 mg/dl Final     Calcium   Date Value Ref Range Status   03/01/2023 9.8 8.7 - 10.5 mg/dL Final   09/24/2012 9.9 8.6 - 10.2 mg/dl Final     Total Protein   Date Value Ref Range Status   04/04/2023 6.5 6.0 - 8.4 g/dL Final     Albumin   Date Value Ref Range Status   04/04/2023 3.7 3.5 - 5.2 g/dL Final     Total Bilirubin   Date Value Ref Range Status   04/04/2023 1.0 0.1 - 1.0 mg/dL Final     Comment:     For infants and newborns, interpretation of results should be based  on gestational age, weight and in agreement with clinical  observations.    Premature Infant recommended reference ranges:  Up to 24 hours.............<8.0 mg/dL  Up to 48 hours............<12.0 mg/dL  3-5 days..................<15.0 mg/dL  6-29 days.................<15.0 mg/dL       Alkaline Phosphatase   Date Value Ref Range Status   04/04/2023 88 55 - 135 U/L Final   09/24/2012 63 23 - 119 UNIT/L Final     AST   Date Value Ref Range Status   04/04/2023 27 10 - 40 U/L Final   09/24/2012 26 10 - 30 UNIT/L Final     ALT   Date Value Ref Range Status   04/04/2023 18 10 - 44 U/L Final     Anion Gap   Date Value Ref Range Status   03/01/2023 9 8 - 16 mmol/L Final   09/24/2012 12 5 - 15 meq/L Final     eGFR if    Date Value Ref Range Status   07/23/2022 >60.0 >60 mL/min/1.73 m^2 Final     eGFR if non    Date Value Ref Range Status    07/23/2022 >60.0 >60 mL/min/1.73 m^2 Final     Comment:     Calculation used to obtain the estimated glomerular filtration  rate (eGFR) is the CKD-EPI equation.        @labrcntip(troponini)@    BNP   Date Value Ref Range Status   08/12/2022 90 0 - 99 pg/mL Final     Comment:     Values of less than 100 pg/ml are consistent with non-CHF populations.   }   Lab Results   Component Value Date    CHOL 146 03/01/2023    CHOL 159 02/17/2022    CHOL 197 01/13/2021     Lab Results   Component Value Date    HDL 60 03/01/2023    HDL 57 02/17/2022    HDL 51 01/13/2021     Lab Results   Component Value Date    LDLCALC 73.4 03/01/2023    LDLCALC 87.8 02/17/2022    LDLCALC 120.4 01/13/2021     Lab Results   Component Value Date    TRIG 63 03/01/2023    TRIG 71 02/17/2022    TRIG 128 01/13/2021     Lab Results   Component Value Date    CHOLHDL 41.1 03/01/2023    CHOLHDL 35.8 02/17/2022    CHOLHDL 25.9 01/13/2021      Last Echo: 9/2022  Last stress test: 9/2021, Lexiscan  Cardiovascular angiogram: none  ECG: atrial paced, rate 60, left axis, pulmonary pattern with low anterior forces, QTc 478 msec  Fundoscopic exam: biannually, no retinopathy, being treated for glaucoma.    In 6/2014:  Hospitalized 6/3/2014 for acute right sided weakness and slurred speech  LEONARDO Dela Cruz is a 69 y.o. female admitted to the services of hospital medicine via the ER for acute CVA. Presented with difficulty speaking, facial drooping and weakness of the right upper and lower extremities. She missed the time window for tPA. ST/PT/OT as well as cardiology and neurology were consulted. Dr. Jurado of cardiology managed A fib. Patient rapidly improved functioning with PT/OT/ST. Currently her goals with PT are met as she is ambulating over 400 feet independently.  She was not approved for IP rehab and refuses SNF. She will be discharged home with outpatient PT/ST/OT evaluation and treatment.    Neurocardio work up  CT head - Mild involutional changes  and findings suggesting mild microvascular ischemic change with no acute intracranial findings    Carotid US - No hemodynamically significant stenosis is noted by velocity criteria involving either internal carotid artery.  A hemodynamically significant stenosis is defined as a stenosis of greater than 50% of the internal carotid artery vessel lumen    ECHO, 6/4/2014, CONCLUSIONS     1 - Concentric hypertrophy. Wall thickness is mildly increased, with the septum and the posterior wall each measuring 1.1 cm across.    2 - Normal left ventricular systolic function (EF 60-65%).     3 - Mild mitral regurgitation.     4 - Left ventricular diastolic dysfunction.     5 - Pulmonary hypertension. estimated PA systolic pressure is 64 mmHg.    6 - Normal right ventricular systolic function .     7 - Suggestion for increase in LV mass from echo on 3/18/2014..     Echo bubble study also negative. Had episode of PAF during monitoring and convert with restart of Flecainide.   Since DC, improving strength in the right hand, right leg feels normal but tire easier. Speech improving. To receive PT/OT/speech today.  Medications reviewed - will DC ASA for now, and know the effects of the Limbitrol and Ativan, uses prn rarely. Some loss of appetite and taste.    Active problems in hospital  Acute left hemispheric CVA, MRI suggest multiple areas, was not on OAC nor antiplatelet Rx  PAF with high CHADS-VAS score, post ablations and DCCs  HTN with LVH  Interstitial lung disease  Pulmonary hypertension  Hyperlipidemia was not on statin    Since visit of 6/20, problem with peripheral swelling, now taking Lasix daily. Last hospitalization / CMP, 6/3 showed K+ 3.4 with normal renal function. Also noted AVILA along with exertional chest heaviness, on-and-off over past several years. Noted it with walking and have to rest. Can last up to an hour. Max grade 10/10, yesterday during the day.  in hospital. Also quite anxious, stopped Limbitrol  due to side effects, managed by Dr. Olivo. Quite sedentary and major decrease in appetite, due to depression. No Psychiatrist. Home BP high 175/97. ECG shows NSR, rate 61, right axis, nonspecific T waves abnormalities, prolong QTc 483 msec. Low anterior forces.    Holter, 6/30/2014:  PREDOMINANT RHYTHM  1. Sinus rhythm with heart rates varying between 43 and 67 bpm with an average of 55 bpm.     VENTRICULAR ARRHYTHMIAS  1. There were frequent polymorphic PVCs totalling 1388 and averaging 40 per hour.  There was 1 couplet.    2. There were no episodes of ventricular tachycardia.    SUPRA VENTRICULAR ARRHYTHMIAS  1. There were very rare PACs totalling 47 and averaging 1 per hour.     2. There were no episodes of sustained supraventricular tachycardia.    SINUS NODE FUNCTION  1. There was no evidence of high grade SA khai block.     AV CONDUCTION  1. There was no evidence of high grade AV block.     DIARY  1. The diary was returned, but not completed    MISCELLANEOUS  1. This was a tape of adequate length (34 hrs).     Since visit of 7/24, better, reviewed by Dr. Olivo and started antidepressant Lexapro. BMP still showed mild hypokalemia of 3.3, not using Lasix at this time. Still feeling fatigue, anxiety, and request refill for Nexium. Discussed need for GI review on chronic PPI. Lack of appetite.  Lexiscan, 7/30/2014, - Nuclear Quantitative Functional Analysis:   LVEF: 66 % (normal is 55 - 69)  LVED Volume: 50 ml (normal is 60 - 98)  LVES Volume: 17 ml (normal is 20 - 42)    Impression: NORMAL MYOCARDIAL PERFUSION  1. The perfusion scan is free of evidence for myocardial ischemia or injury.   2. There is a mild intensity fixed defect in the anteroseptal wall of the left ventricle, secondary to breast attenuation.   3. Resting wall motion is physiologic.   4. Resting LV function is normal.  (normal is 55 - 69)  5. The ventricular volumes are normal at rest and stress.   6. The extracardiac distribution of  "radioactivity is normal.     No problem with spironolactone, BP improved, home BP similar to office readings.    Since visit of 8/14, had some distress and home monitor showed HR of 40, felt "bad" and nervous to ED, note reviewed, ECG and final pulse count 57-58. Labs reviewed - normal renal function and K+ 3.8. Still taking one tab of K+ daily. Not using Lasix. Explained HR discrepancy may be due to VPCs. Reviewed by Ms. Fermin and Lexapro increased to 20 mg, 2 days ago. Feeling "a lot better". Sleeping better. Still sedentary but ready to be more active. Eating better have lost additional 5 lbs since 8/2014.    Since visit of 9/5, still with speech therapy, noted progressive near syncope with SOB and irregular heart beats. Also noted some ankle swelling over the past 2 weeks. ECG shows marked bradycardia, rate 48, right axis, pulmonary pattern, 1st degree AVB. Wellbutrin 100 mg daily along with Lexapro 20 mg by Ms. Fermin NP. BMP showed K+3.5. To use Lasix PRN    Since visit of 9/25, still with marked AVILA, only able to walk about 30' before having to stop. Bothered by increase palpitations during tapering of BB. No definite lung problem, evaluated this year. ECG shows sinus dewayne, rate 53, VPC, RBBB with suggestion of septal MI. No evidence for anemia - CBC 9/3/2014 was normal. Just started doing some activities with arm and leg exercise for the last 2 weeks, Doing some OT alternating with PT every other day.  CXR, 6/3/2014 - Lungs are clear of infiltrate and costophrenic sulci are sharp.  The cardiomediastinal silhouette appears stable.    PET scan, 4/2014 - 1.A hypermetabolic left pulmonary mass is noted with an SUV of 3.0 maximally.  A neoplastic, infectious, or granulomatous process is on the differential. Clinical correlation is suggested.  Close follow-up for resolution or biopsy would be suggested    2.  Elevated right-sided heart pressures are suspected with a large right atrium    3.  Right-sided pleural " "effusion    4.  Hypermetabolic thyroid gland asymmetrically on the right.  This may relate to thyroiditis, Graves' disease, or neoplastic process.  Ultrasound may be helpful.    5.  Small hypermetabolic focus in the expected location of the left ovary, a female pelvis ultrasound may be helpful for further evaluation.  This could relate to a uterine fibroid that has necrosed or infarcted    Biopsy of lung nodule on 5/1/2014 - negative for CA    CTA, 4/2014 - No evidence of pulmonary thromboembolism.    2.  Enlarged cardiac silhouette and secondary findings of elevated right heart pressures with diffuse mosaic lung pattern and right basilar pleural fluid suggesting cardiac decompensation/heart failure.    3. Unchanged 1 cm nodular density within the left upper lobe which previously demonstrated hypermetabolism by PET/CT and unchanged mediastinal lymphadenopathy.    4.  Subpleural nodular density within the right upper lobe is unchanged when compared with the previous study and could represent an area of remote fibrotic scarring.    5.  Heterogeneous appearance of the spleen, possibly due to the phase of contrast enhancement.  Evolving splenic infarction is not entirely excluded.    6. Suggestion of colonic wall thickening involving the descending colon.  Please correlate for symptoms of colitis.    Since visit of 10/14, rehospitalized for HF on 10/26 to 10/29/2014.  DCS - "Ayla Dela Cruz is a 69 y.o. female admitted to the services of hospital medicine via the ER for acute diastolic heart failure. C/o severe SOB and increase in leg swelling over the past two days. Under the care of Dr. Jurado. Recently was taken off metoprolol. History of paroxsymal atrial fib. Hospitalized here in June in this year for acute CVA. It was at that time, pt started Xarelto. Deficits has resolved. No history of heart failure.     Hospital Course: The patient was admitted with acute CHF biventricular and mild elevation of her troponin's " "at 0.029 - 0.035 and therefore an anginal equivalent was suspected. The patient also had significant hypertension upon admission and although she was not in atrial fibrillation, her EKG showed a significant first degree AV block and RBBB. Discussion was made as to whether or not to decrease the patient flecainide; however due to the risk of her going back into atrial fibrillation this was not done. Upon discharge the patient's lisinopril was increased to 10 mg and aldactone was added."    RHC done HEMODYNAMIC RESULTS:    LVEDP (Pre): 24 mmHg  BP: 166/104    Air rest:  PA: 90/34 (54)  PW:  (24)  RVEDP: 16  RA:  (16)    C. SUMMARY:    1. Diastolic dysfunction.  2. - Kee CO 2.64 L/min  3. - Mean systemic pressure 108.6 mmHG, stage II HTN  4. - SVR 41.16 Wood Units  5. - PVR 11.36 Wood Units  6. - Pulmonary HTN due to left sided heart disease and stage II HTN    D. RECOMMENDATIONS:    1. Routine post-cath care.  2. Cardiac rehab referral.  3. Follow up with Dr. Barrett Jurado.  4. - Aggressive BP lowering and diuresis    Repeat ECHO 11/5/2014 CONCLUSIONS     1 - Concentric remodeling. Septal wall thickness is increased, and posterior wall thickness is mildly increased, with the septum measuring 1.3 cm and the posterior wall measuring 1.1 cm across.    2 - Mildly to moderately depressed left ventricular systolic function (EF 40-45%).     3 - Left ventricular diastolic dysfunction. E/e'(lat) is 16    4 - Right ventricular enlargement with mildly depressed systolic function.     5 - Pulmonary hypertension. estimated PA systolic pressure is 56 mmHg.     6 - Intermediate central venous pressure.     7 - No evidence of intracardiac shunt.     8 - No significant change from Echo of 6/4/2014.    Active problems:  Progressive severe SOB, functional class IV  Diastolic dysfunction, elevated BNP and Troponin  Hypokalemia due to diuretics  Secondary Pulmonary HTN with peripheral edema  HTN  History of CVA 6/3/2014  PAF controlled with " "Flecanide  Marked bradycardia with BB  On OAC  Hematuria    At home receiving PT, OT and speech Rx twice a week, with  nurse once a week, no problem with medications. Feeling better, sleeping well. Home /73.    Since visit of 11/14, feeling "much better", concern of occasional HTN, home BP /66-99, HR . Lot more active, no problem. Not using walker, no CP, SOB, nor dizziness. Recent BMP - normal renal function and K+ 4.1.    Since visit of 12/19/2014, worry about HTN home readings similar to office up to . Morning reading is higher. No HA nor visual abnormalities. Xanax has helped but afraid to use too much. ECG shows sinus arrhythmia, rate of 65, late transition and LAFB. Also bothered with dry cough with the Lisinopril. And started to have return of arm pains that was attributed to atorvastatin, on Crestor. Discussed options.     Since visit of 1/16/2015, noted frequent nocturia, 8-10 times, taking losartan-HCT at night time. Have stopped using Lasix and spironolactone for past 2 months. More active without much problem. Labs normal renal function, K+ 4.2, BNP now 37, A1C 5.6%.    Since visit of 2/18/2015, improving, no further dizziness, some SOB when "stressed", usually helped with alprazolam, use only prn, about 3-6 times weekly. Compliant with medications. Been off Crestor for 6 weeks with concern of muscle problem. Home BP is fine, similar to office.     Since visit of 4/15, appetite returned, feeling a lot better. Active, walking twice a week for about half an hour. Also do calisthenic about twice a week, no problem. Seeing Dr. Zavaleta for generalized pruritis for past 3 months. Cardiac wise less AVILA. Sleeping well. Labs in 5/2015, normal CBC without eosinophilia, thyroid normal, ferritin level low normal at 21. Off Crestor for couple months due to fear of muscle pain but did not find much difference being off medication. Last Lipid in 11/2014 was good, on daily dose of Crestor. Home " "/79.    Since visit of 7/2/2015, feeling "great", very active without problem, no more AVILA nor dizziness with standing. Compliant with medications, don't miss. Using Tylenol 500 mg BID for arthritis. Notice easy bruising on Xarelto. Home /10.  Holter - PREDOMINANT RHYTHM  1. Sinus arrhythmia with heart rates varying between 46 and 110 bpm with an average of 73 bpm.     VENTRICULAR ARRHYTHMIAS  1. There were very rare PVCs recorded totalling 8 and averaging less than 1 per hour.     2. There were no episodes of ventricular tachycardia.    SUPRA VENTRICULAR ARRHYTHMIAS  1. There were very frequent PACs totalling 3054 and averaging 132 per hour.  There were 29 bigeminal cycles.  There were 103 couplets.    2. 3% of complexes.    3. There were no episodes of sustained supraventricular tachycardia.    SINUS NODE FUNCTION  1. There was no evidence of high grade SA khai block.     AV CONDUCTION  1. There was no evidence of high grade AV block.     2. The longest RR interval was 1565 msec.     DIARY  1. The diary was properly completed.     2. There was 1 episode of skipping beats reported. The corresponding rhythm strips revealed the following:             During event 1 the rhythm was sinus rhythm at 75 bpm.     3. There was 1 episode of skipped beat reported. The corresponding rhythm strips revealed the following:             During event 1 the rhythm was sinus arrhythmia at 63 bpm.     MISCELLANEOUS  1. This was a tape of adequate length (23 hrs).     Since visit of 10/15, place on new antidepressant, Venlafaxine 75 mg daily, tried 2 and developed rapid HR to 125 bpm, feels more anxious, now switched to Citalopram. Also noted the daily dose of Lorazepam does not seem adequate. Orthostatic VS is positive with lying 142/73, , standing 120/62, . Been taking spironolactone 25 mg for last 3 days. Does feel thirsty. Labs reviewed A1C 5.8%, CBC, BMP were WNL. Lipid shows LDL 99, non-. Goal " "preferred is <70 and < 100. No problem with 10 mg of Crestor. Had vacation at [place] and [place], walked all over without problem.    Since visit of 1/18/2016, no new problem. 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 Labs with , non-, hsCRP at 2.56.     In 10/2016, had acute GB attack with rupture in 8/2016, then tried on Wellbutrin took only 1-2 tabs and BP up to 201/100 with head tightness. Subsequently back to normal, the last 2.5 days took Losartan bid. Discussed Rx options for HTN. Advised to be more active, regular exercise. Lab reviewed LDL in 8/2016 93.     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 Still with daily palpitations, last up to half hours. Have electronic watch, David Barroso, since 9/2016, suppose harmonize patient with the universe. Feeling better if on during the day. Lot of walking at school, no problem.   Holter in 10/2016 - PREDOMINANT RHYTHM  1. Sinus rhythm with heart rates varying between 52 and 144 bpm with an average of 75 bpm.     2. Paroxysmal atrial fibrillation    VENTRICULAR ARRHYTHMIAS  1. There were occasional mostly monomorphic PVCs totalling 596 and averaging 13 per hour.     2. There were no episodes of ventricular tachycardia.    SUPRA VENTRICULAR ARRHYTHMIAS  1. There were frequent PACs totalling 2982 and averaging 69 per hour.  There were 69 bigeminal cycles.  There were 55 couplets.    2. There were no episodes of sustained supraventricular tachycardia.    3. Paroxysmal atrial fibrillation was noted in the the study.     SINUS NODE FUNCTION  1. There was no evidence of high grade SA khai block.     AV CONDUCTION  1. There was no evidence of high grade AV khai filtering.     2. The longest RR interval was 1725 msec.     DIARY  1. The diary was returned, but not completed    MISCELLANEOUS  1. This was a tape of adequate length (43 hrs).     ECHO CONCLUSIONS     1 - Hyperdynamic left ventricular systolic function (EF 65-70%).    " " 2 - Normal left ventricular diastolic function.     3 - Normal right ventricular systolic function .     4 - Pulmonary hypertension. The estimated PA systolic pressure is 64 mmHg.     5 - Less evidence for LVH from Echo in 11/2014.     In 5/2017, bothered by recurrent PAF with AVILA after 10 feet walking. Felt better before on Flecainide 100 mg bid, but Holter continued to show PAF. Attempt up titration, problem with itching, switched to propafenone 150 mg tid, continued with itching and reviewed by allergist, treated with 10 days of cortisone with benefit. No hive and no itching, just don't feel good due to the irregular rhythm. History reviewed, had 2 prior AF ablation, last in [place], [place] in around 2000, recall being told not to do again. Recall seeing an [facility] EP doc but didn't like him. Discussed various options. Will stop antiarrhythmic for now, will be going on a 16 days trip to [place]. Reviewed prior medications, have taken Tenormin, metoprolol tartrate, and short-acting diltiazem in the past without problem. Refer to Dr. Calderon for review. ECG in AF, rate 99, PRWP, LAFB    In 11/2017, post AF ablation, felt good for a few weeks, noted some SOB and had review with Dr. Calderon on 11/1 - 72 year old female with a longstanding history of atrial arrhythmias. Her [id number] Score is 5 (age, female, TIA, HTN) so she should remain on anticoagulation indefinitely. She has failed Flecainide. She is now 6 weeks post-PVI and MA flutter line, and maintaining sinus rhythm on low-dose Amiodarone. Given her intermittent AVILA which started post-ablation, I have stopped Amiodarone which I think is the likely culprit. I instructed her to continue taking Lasix PRN, as well as metoprolol and Xarelto." ECG on 11/1 was in NSR, rate 61, PRWP.  Recommended to stop amiodarone but then started to feel the palpitation daily, felt nervous and at the same time being taper down on the nightly Ativan. Did have some breakthrough anxiety " "attacks. Also had strained the upper back and right arm / shoulder, requesting some Tramadol, tried Jan's Tramadol with good relief. Understand this is an opoid. Used Excedrin with caffeine and bothered by more palpitations.    In 3/2018, here for recurrent palpitations, no inciting factors over the past 6 weeks. Had review with Dr. Calderon in 2/2018 - "72 year old female with a longstanding history of atrial arrhythmias and prior ablations at outside institutions. Her [id number] Score is 5 (age, female, TIA, HTN) so she should remain on anticoagulation indefinitely. She is now 5 months post-PVI and MA flutter line, and maintaining sinus rhythm off Amiodarone. The plan is to continue Xarelto, and to see me again in 6 months." Palpitations appears associated with AVILA, had difficulties walking in house or up a ramp. Started 100 mg of amiodarone last week, daily, feels some benefit. ECG today in Sinus rhythm vs wandering atrial pacemaker with frequent PJC, rate 59. Also taking Losartan in the morning appears more effective.  Holter 11/2017 - PREDOMINANT RHYTHM  1. Sinus arrhythmia with heart rates varying between 39 and 102 bpm with an average of 58 bpm.     VENTRICULAR ARRHYTHMIAS  1. There were occasional PVCs totalling 728 and averaging 15 per hour.  There were 5 bigeminal cycles.     2. There were no episodes of ventricular tachycardia.    SUPRA VENTRICULAR ARRHYTHMIAS  1. There was no supraventricular ectopic activity.    SINUS NODE FUNCTION  1. There was no evidence of high grade SA khai block.     AV CONDUCTION  1. There was no evidence of high grade AV block.     2. The longest RR interval was 1820 msec.     DIARY  1. The diary was not returned    MISCELLANEOUS  1. There were occasional hookup related artifacts. Overall, the study was of good quality.     2. This was a tape of adequate length (48 hrs).     in 5/2018, no new problem, still concern of AVILA, usually with walking, variable as little 10 feet or " "fine with some long walk, can play flute for an hour but find difficulties in holding a note. Off daily amiodarone, uses it prn for palpitation, find helpful. Labs reviewed from 1/2018, TSH, Vitamin D, LDL 68.2, A1C 5.9%, CMP - normal renal function, K+ 3.6. To go to [place] for a month in 8/2018.    In 9/2018, return from a month family reunion trip to [place]. Problem with hypotension with Tramadol and Losartan. Had review with Dr. Calderon on 9/5 "Ayla Dela Cruz is a 73 year old female with a longstanding history of atrial arrhythmias and prior ablations at outside institutions. Her UXA4GI2-RARb Score is 5 (age, female, TIA, HTN) so she should remain on anticoagulation indefinitely. She is now 11 months post-PVI and MA flutter line, and maintaining sinus rhythm off Amiodarone. However, she is feeling poorly, with frequent PACs and borderline hypotension. The plan is to stop Losartan, echo in next several weeks, Holter monitor in 6 weeks, and follow-up with me in 8 weeks."  Off both medication on 8/31, no problem now. No heart worry. Denies any CP nor SOB. Still teaching. ECG on 9/5 shows NSR with sinus arrhythmia.    In 3/2019, return for follow up, had syncopal spell with hypotension at [facility] required reconstruction of the right ankle, now in PT, doing well. No heart complication, no AF. LDL 90.6 in 1/2019. 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 No heart worries, no CP nor SOB, much better, breathing improved with daily Breo use. Discussed option with NOAC.    In 9/2019, 6 months review, concern of bruising with Xarelto, recall problem with dark stool with Eliquis. Discussed option.     In 1/2020 return due to allergic reaction to Pradaxa, had to stop Eliquis for similar problems - hives and rash, difficult to sleep. Also problem with embolism when off NOAC for 7 days. Discuss alternatives.    In 3/2020, same problem with Savaysa, noted about an hour of PAF this morning, felt nervous. " "Troubled by recent viral infection and also pain injection with steroid, which helped the rash temporary. Would like to proceed with mechanical alternative. History include past use of Coumadin for about 2 years, had some difficult with GIB and also required up to 3 times a week testing.    In 8/2020, here for 6-months review. Troubled more with JOSE with quarantine. Working with therapist. Keeping busy with working on a new book, music also. No heart worries.    In 8/2021, return for annual review. Problem with requiring medication changes for major depression with anorexia and malnutrition. Weight loss of 20 lbs and now improving. No cardiac issue except for slow heart rate down to 52 bpm, asymptomatic.    Fco Calderon MD noted "75 year old female with a longstanding history of atrial arrhythmias and prior ablations at outside institutions. Her [id number] Score is 5 (age, female, TIA, HTN) so she should remain on anticoagulation indefinitely. She is now >3 years post-PVI and MA flutter line, and is maintaining sinus rhythm off Amiodarone.      Ms. Dela Cruz had a likely cardioembolic event after stopping AC for a week. Ms. Dela Cruz wants to be on an anticoagulant she can crush. I have switched her back to eliquis 5 mg bid. She thinks her pruritis was from [employer] laundry detergent and not eliquis.     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 I have told her to contact Dr. Pedraza if she decides she can no longer take anticoagulants so she can discuss details of procedure with him further."    In 1/2022, return for follow up. Post PPM in 10/2021, one episode of near syncope with head injury, no clear etiology, no problem over the past 2 months. Noted some morning dizziness with  to 110 " and HR in the 80s.. Not as active as before and also noted some cognitive dysfunction with loss of ID. No cardiac complaints. Medication reviewed, on low-dose BB for PAF rate control.    Echo 2/2021 - Mild concentric hypertrophy and normal systolic function. The estimated ejection fraction is 60%  There is no evidence of intracardiac shunting.  Small circumferential pericardial effusion.  Mild aortic regurgitation.  Moderate tricuspid regurgitation.  Mild to moderate pulmonic regurgitation.  There is moderate tricuspid valve stenosis.  Moderate right atrial enlargement.  Normal right ventricular size.  Mild mitral regurgitation.  Mild left atrial enlargement.  Intermediate central venous pressure (8 mmHg).  The estimated PA systolic pressure is 53 mmHg.  There is pulmonary hypertension.  Normal left ventricular diastolic function.    Lexiscan 9/2021 - Impression:     1.  Scintigraphically negative for ischemia.  2. Fixed defect along the septal wall is suspicious for a small infarct.  3. The global left ventricular systolic function is normal with an LV ejection fraction of 80 % and no evidence of LV dilatation.  4. Wall motion is normal.    In 7/2022, return post eye operation, unable to be active due to vision, now able to see. Very weak and frail due to anorexia and sedentary status. Noted some heart racing for past 2 days. Back to NSR today. Under nutritional review.    In 12/2022, return for 6-months follow-up. Noted some AMS with standing and lower BP, now weaning off amitriptyline. Also working with eating disorder and is eating better. Working with new Psychiatrist. Discussed pulmonary hypertension, too frail for medications.      Echo 9/2022 - The left ventricle is normal in size with moderate concentric hypertrophy and normal systolic function.  The estimated ejection fraction is 60%.  Indeterminate left ventricular diastolic function.  Normal right ventricular size with normal right ventricular systolic  "function.  Mild left atrial enlargement.  Moderate right atrial enlargement.  Mild aortic regurgitation.  Mild mitral regurgitation.  Moderate tricuspid regurgitation.  Mild pulmonic regurgitation.  Normal central venous pressure (3 mmHg).  The estimated PA systolic pressure is 60 mmHg.  There is pulmonary hypertension.    PPM check 11/2022 - RA pacing 32%, RV pacing 7.9%   Atrial arrhythmias: AMS t37--ilnpmkn at 2m 40s w/egms c/w afib/CVR   AT/AF burden: <1%  Battery status/longevity (estimated): 8.2-10 years      HPI comments: in 6/2023, doing as well as she can, kept weight stable with Boost Max. No heart worries.    Fco Calderon MD noted 3/2023 "76 year old female with a longstanding history of atrial arrhythmias and prior ablations at outside institutions. Her [id number] Score is 5 (age, female, TIA, HTN) so she should remain on anticoagulation indefinitely. She is now 4 years post-PVI and MA flutter line, and was maintaining sinus rhythm off Amiodarone until 9/2021. She is now status-post dual chamber pacemaker implantation. AMS burden <1%     Plan is to hold the course and see me again in 1 year. Should she have increased AF burden the plan will be to start sotalol 40 mg bid.     Ms. Dela Cruz had a likely cardioembolic event after stopping AC for a week. Given her history of CVA of unclear etiology but possibly cardioembolic, I think the best course is for her to continue anticoagulation indefinitely, and only consider a Watchman device should she ever develop an absolute contraindication to oral anticoagulation."    PPM check 5/2023 - Presenting egram demonstrates Ap/Vs w/ PAC's   Device function WNL   Autocapture algorithms are RA, RV (OFF).   RA pace 77%, RV pacing 2%   Atrial arrhythmias: AMS x 16, MAX 23 mins 16 sec, Overall burden <1% Overall VRs controlled   Anticoagulation status: Eliquis   Ventricular arrhythmias: None   Battery status/longevity: 7.7-9 years   Return to clinic in May 2024     UA " LE arterials 3/2023 - No elevated peak systolic velocities suggesting hemodynamically significant narrowing.  Normal GODFREY on the right.  GODFREY on the left is borderline normal.     ROS     Constitutional: negative for chills, have chronic fatigue, no fevers, sweats, no further slurred speech, and no dysarthria, have improved appetite with JOSE, intolerance to heat, bruises easily on NOAC and steroid, weight stable from 12/2022.  Eyes: negative for icterus, redness and visual disturbance, have visual halo,   Respiratory: negative for asthma, cough have resolved off ACE-I, no dyspnea on exertion, SOB with HR 85 to 100 bpm, have lifelong snoring, [place] score 6 improved down to 5, no hemoptysis, pleurisy/chest pain and wheezing  Cardiovascular: negative for chest pain, chest pressure/discomfort, no further orthostatic dizziness, and still some palpitations but have irregular heart beats, no paroxysmal nocturnal dyspnea and syncope  Gastrointestinal: negative for abdominal pain, nausea and vomiting, no further GERD and dysphagia (psychologic), have hemorrhoids  Musculoskeletal:positive for arthralgias, and less right sided muscle weakness with improvement in leg strength, improved bilateral ankle pains - OA and no myalgias  Neurological: negative for coordination problems, gait problems, headaches, paresthesia, have some tremors but able to draw, loss of voice at times, and no vertigo  Psych: positive for depression, nervousness, anxiety, less stressed due to 's have improved  Skin - no further pruritic rash, have dryness    Answers submitted by the patient for this visit:  Review of Systems Questionnaire (Submitted on 6/9/2023)  activity change: No  unexpected weight change: No  neck pain: No  hearing loss: No  rhinorrhea: No  trouble swallowing: No  eye discharge: No  visual disturbance: No  chest tightness: No  wheezing: No  chest pain: No  palpitations: Yes  blood in stool: No  constipation: No  vomiting:  "No  diarrhea: No  polydipsia: No  polyuria: No  difficulty urinating: No  hematuria: No  menstrual problem: No  dysuria: No  joint swelling: No  arthralgias: Yes  headaches: No  weakness: Yes  confusion: No  dysphoric mood: No        Objective:    Physical Exam - orthostatic VS: sitting 103/56, standing 100/57    General appearance: alert, appears stated age, cooperative and no distress, improved skin turgor, RA O2 sat. 100%  Head: Normocephalic, without obvious abnormality, atraumatic  Eyes:  conjunctivae/corneas clear. PERRL, EOM's intact.    Neck: no adenopathy, no carotid bruit, no JVD, supple, symmetrical, trachea midline and thyroid not enlarged, symmetric, no tenderness/mass/nodules  Lungs:  clear to auscultation bilaterally  Chest wall: no tenderness  Heart: regular rate and rhythm, normal S1, S2 normal, click, rub or gallop    Abdomen: soft, non-tender; bowel sounds normal; no masses,  no organomegaly, waist 31" down to 27" hip 42"  Extremities: extremities no further weakness of the right upper extremity, atraumatic, no cyanosis and have no edema, minor varicose veins  Pulses: 2+ and symmetric    Assessment:       1. Paroxysmal atrial fibrillation, ablations X 3 1995, 1997, 9/2017, CHADS-VAS score 5, HAS-BLED score 5     2. Frail elderly    3. Dehydration, moderate    4. Cardiac pacemaker in situ, [employer], dual chamber, 10/14/2021    5. Benzodiazepine dependence, Ativan 0.5 mg daily since 1/2023    6. Pure hypercholesterolemia    7. Pulmonary hypertension, without RV dysfunction    8. H/O: CVA (cerebrovascular accident), June 2014    9. Hypertensive left ventricular hypertrophy, without heart failure    10. Sedentary lifestyle, 6/2014    11. AVILA (dyspnea on exertion)    12. Chronic diastolic heart failure, 2014         Plan:       Ayla was seen today for follow-up.    Diagnoses and associated orders for this visit:    Paroxysmal atrial fibrillation, ablations X 3 1995, 1997, 9/2017, CHADS-VAS " score 5, HAS-BLED score 5     Frail elderly    Dehydration, moderate    Cardiac pacemaker in situ, [employer], dual chamber, 10/14/2021    Benzodiazepine dependence, Ativan 0.5 mg daily since 1/2023    Pure hypercholesterolemia    Pulmonary hypertension, without RV dysfunction    H/O: CVA (cerebrovascular accident), June 2014    Hypertensive left ventricular hypertrophy, without heart failure    Sedentary lifestyle, 6/2014    AVILA (dyspnea on exertion)    Chronic diastolic heart failure, 2014    - All medical issues reviewed, continue current Rx   - Warning signs of MI and stroke given, if symptoms last more than 5 minutes, stop immediately and call 911, then chew 2-4 low-dose ASA (81 mg).  - Need to remain well hydrated but avoid drinking within 4 hours of bedtime. Recommend at least 90 oz of fluid daily per IOM (institute of Medicine) suggestion.  - CV status and off antiarrhythmic, all medications reviewed, patient acknowledge good understanding.  - Recommend healthy living: healthy diet and regular exercise aiming for fitness, restorative sleep and weight control  - Recommend using Tylenol as first line pain medications.  - Instruction for Mediterranean diet and heart healthy exercise given.  - Need good exercise program, 4 key elements: 1. Aerobic (walking, swimming, dancing, jogging, biking, etc, 2. Muscle strengthening / resistance exercise, need to do 2-3 times weekly, 3. Stretching daily, good stretch takes a whole  total minute. 4. Balance exercise daily.  - Encourage activities as much as tolerated. Any activity is better than none!   - Highly recommend 30-60 minutes of exercise daily, can have Sunday off, with 2-3 sessions of muscle strengthening weekly. A  would be very helpful.  - Recommend at least biannual cardiovascular evaluation in view of her significant risk factors, patient 's preference.  - Need to go visit [place], 2 weeks to 3 months.    Patient Active Problem List    Diagnosis    Paroxysmal atrial fibrillation, ablations X 3 1995, 1997, 9/2017, CHADS-VAS score 5, HAS-BLED score 5     Hypertension, 1985    Hyperlipidemia, baseline     Glaucoma (increased eye pressure)    Fibroid uterus    Thickened endometrium    Abnormal CT scan, chest    Interstitial lung disease    Pulmonary hypertension, without RV dysfunction    H/O: CVA (cerebrovascular accident), June 2014    LVH (left ventricular hypertrophy) due to hypertensive disease    Sedentary lifestyle, 6/2014    AVILA (dyspnea on exertion)    OA (osteoarthritis)    Chronic diastolic heart failure, 2014    Elevated brain natriuretic peptide (BNP) level, range 98 - 1045    Microalbuminuria    Hypoalbuminemia due to protein-calorie malnutrition    TIA (transient ischemic attack), 11/3/2014    S/P ablation of atrial flutter    JOSE (generalized anxiety disorder)    Other spondylosis, lumbar region    Cervical spondylosis    Medication intolerance    Anticoagulant long-term use    GERD (gastroesophageal reflux disease)    Mild intermittent asthma without complication    Elevated troponin    BMI less than 19,adult    Localized hives    Elevated LFTs    Iron deficiency anemia due to chronic blood loss    Recurrent major depressive disorder    Gastroenteritis    Other spondylosis, cervical region    History of DVT (deep vein thrombosis)    Chronic sinus complaints    Second degree AV block, Mobitz type I    Anemia    Syncope    Protein calorie malnutrition    At risk for cardiovascular event    Atrial fibrillation    Long term (current) use of anticoagulants    Atrial arrhythmia    Cardiac pacemaker in situ, [employer], dual chamber, 10/14/2021    Cognitive impairment    Frail elderly    Dehydration, moderate    Hyphema, right eye    Vitreous hemorrhage, right eye    Primary open angle glaucoma (POAG) of both eyes, indeterminate stage    Pseudophakia of both eyes    Chronic fatigue    Anorexia nervosa    Benzodiazepine  dependence, Ativan 0.5 mg daily since 1/2023     Total time spend including review of record prior to face-to-face visit is 30 minutes. Greater than 50% of the time was spent in counseling and coordination of care. The above assessment and plan have been discussed at length. Referring provider's note reviewed. Labs and procedure over the last 6 months reviewed. Problem List updated. Asked to bring in all active medications / pills bottles with next visit. Will send note to referring / PCP.

## 2023-06-18 NOTE — TELEPHONE ENCOUNTER
Informed pt Rx was sent to pharmacy, pt verbalized an understanding.     Attempted to call pt. [facility]       ----- Message from [place] sent at 5/29/2020  2:36 PM CDT -----  Contact: pt   Pt states that she called yesterday and spoke to the nurse who was supposed to have the Dr call her in a cream for itching. It still has not been called in and the pt is itching badly...[phone] (home)      moderate

## 2023-06-29 ENCOUNTER — PATIENT MESSAGE (OUTPATIENT)
Dept: FAMILY MEDICINE | Facility: CLINIC | Age: 78
End: 2023-06-29
Payer: MEDICARE

## 2023-06-29 NOTE — TELEPHONE ENCOUNTER
Please see attached portal message from patient. LOV noted on 2-23-23. Patient has existing appointment noted on 2-26-24. Please advise. Thank you.

## 2023-06-30 ENCOUNTER — PATIENT MESSAGE (OUTPATIENT)
Dept: FAMILY MEDICINE | Facility: CLINIC | Age: 78
End: 2023-06-30
Payer: MEDICARE

## 2023-07-03 NOTE — PROGRESS NOTES
Outpatient Psychiatry Follow-Up Visit    Clinical Status of Patient: Outpatient (Ambulatory)  07/03/2023     Chief Complaint: 78 y/o female presenting today with  for a follow-up.       Interval History and Content of Current Session:  Interim Events/Subjective Report/Content of Current Session: 78 y/o female follow-up appointment.    Pt is a 78 y/o female with past psychiatric hx of depression, anxiety, eating disorder who presents for follow-up treatment. Pt's  reported that things continue to improve. Weight is increasing and no recent choking. Has been willing to try alternative recipes for caloric increase. Pt noted some dry mouth but no other difficulties with reducing amitriptyline dose. Cardio seen recently and noted that things are going well. Reviewed Genesight results, confirming plan to taper and D/C amitriptyline.     Past Psychiatric hx: remeron, rexulti, vraylar, Limbitrol, venlafaxine, fluoxetine, gabapentin, cannot remember others.     Past Medical hx:   Past Medical History:   Diagnosis Date    Anticoagulant long-term use     Anxiety     Arthritis     Atrial fibrillation     Cancer     skin    CHF (congestive heart failure)     Depression     DVT (deep venous thrombosis)     GERD (gastroesophageal reflux disease)     Glaucoma (increased eye pressure)     Hyperlipidemia     diet controlled    Hypertension     Interstitial lung disease     Localized hives 01/10/2020    Mild persistent asthma without complication 11/12/2018    Pacemaker     Pneumonia 01/31/2014    Stroke 06/03/2014    Stroke     TIA (transient ischemic attack)     TIA (transient ischemic attack)         Interim hx:  Medication changes last visit: None  Anxiety: decrease  Depression: decrease     Denies suicidal/homicidal ideations.  Denies hopelessness/worthlessness.    Denies auditory/visual hallucinations      Alcohol: Infrequent use  Drug: Pt denied  Caffeine: Not assessed  Tobacco: Pt denied      Review of Systems    PSYCHIATRIC: Pertinent items are noted in the narrative.        CONSTITUTIONAL: weight stable    Past Medical, Family and Social History: The patient's past medical, family and social history have been reviewed and updated as appropriate within the electronic medical record. See encounter notes.     Current Psychiatric Medication:  ativan 0.5 mg qd PRN, buspirone 10 mg TID, trintillex 20 mg, amitriptyline 25 mg (ingredient in lumitrol)      Compliance: yes      Side effects: Pt denied     Risk Parameters:  Patient reports no suicidal ideation  Patient reports no homicidal ideation  Patient reports no self-injurious behavior  Patient reports no violent behavior     Exam (detailed: at least 9 elements; comprehensive: all 15 elements)   Constitutional  Vitals:  Most recent vital signs, dated less than 90 days prior to this appointment, were reviewed.        General:  unremarkable, age appropriate, casual attire, good eye contact, good rapport       Musculoskeletal  Muscle Strength/Tone:  no flaccidity, no tremor    Gait & Station:  normal      Psychiatric                       Speech:  normal tone, normal rate, rhythm, and volume   Mood & Affect:   Depressed, anxious         Thought Process:   Goal directed; Linear    Associations:   intact   Thought Content:   No SI/HI, delusions, or paranoia, no AV/VH   Insight & Judgement:   Good, adequate to circumstances   Orientation:   grossly intact; alert and oriented x 4    Memory:  intact for content of interview    Language:  grossly intact, can repeat    Attention Span  : Grossly intact for content of interview   Fund of Knowledge:   intact and appropriate to age and level of education        Assessment and Diagnosis   Status/Progress: Based on the examination today, the patient's problem(s) is/are adequately controlled.  New problems have not been presented today. Co-morbidities are not complicating management of the primary condition. There are no active rule-out  diagnoses for this patient at this time.      Impression: Pt continues to improve overall and responding well to reducing polypharmacy and medication dosing. Will continue to taper and D/C amitriptyline. Will continue with medication plans as is and continue to consider low dose quetiapine or mirtazapine in the future if having difficulty with sleep (note Genesight results).       Diagnosis:   1) Major Depressive Disorder, recurrent, moderate  2) Generalized Anxiety Disorder  3) Specific Phobia  4) Panic Disorder   5) Personality Disorder traits  6) R/o Dementia of unknown etiology  Intervention/Counseling/Treatment Plan   Medication Management:      1. ativan 0.5 mg qd PRN    2. buspirone 10 mg TID    3. trintillex to 20 mg    4. Reduce Amitriptyline to 12.5 mg     5. Call to report any worsening of symptoms or problems with the medication. Pt instructed to go to ER with thoughts of harming self, others     6. Cont in therapy with Graeme Meyer LCSW    Psychotherapy: 20 minutes   Target symptoms: anxiety, weight loss  Why chosen therapy is appropriate versus another modality: CBT used; relevant to diagnosis, patient responds to this modality  Outcome monitoring methods: self-report, observation  Therapeutic intervention type: Cognitive Behavioral Therapy  Topics discussed/themes: building skills sets for symptom management, symptom recognition, nutrition, exercise  The patient's response to the intervention is accepting  Patient's response to treatment is: good.   The patient's progress toward treatment goals: limited to fair     Return to clinic: 1 month    -Cognitive-Behavioral/Supportive therapy and psychoeducation provided  -R/B/SE's of medications discussed with the pt who expresses understanding and chooses to take medications as prescribed.   -Pt instructed to call clinic, 911 or go to nearest emergency room if sxs worsen or pt is in   crisis. The pt expresses understanding.    Galo Lipscomb, PhD, MP      Antidepressant/Antianxiety Medication Initiation:  Patient informed of risks, benefits, and potential side effects of medication and accepts informed consent.  Common side effects include nausea, fatigue, headache, insomnia., Specifically discussed the possibility of new or worsening suicidal thoughts/depression.  Patient instructed to stop the medication immediately and seek urgent treatment if this occurs. Patient instructed not to abruptly discontinue medication without physician guidance except in cases of sudden onset or worsening of SI.       Stimulant Medication Initiation:  Patient advised of risks, benefits, and side effects of medication and accepts informed consent.  Common side effects include insomnia, irritability, jittery feeling, dry mouth, and agitation/hostility., Patient advised of potential addictive nature of medication and controlled substance classification.  Instructed to safeguard medication as no early refills can be given for lost or stolen medications.       Benzodiazepine Initiation:  Patient advised of the risks, benefits, and common side effects of medication and has accepted informed consent.  Common side effects include drowsiness, impaired coordination, possible memory loss., Patient advised NOT to operate a vehicle or machinery untiil they are sure how the medication will affec them.  Client also advised of danger of mixing this medication with alcohol., Patient advised of potential addictive nature of medication and need to safeguard medication as no early refills for lost or stolen medications can be authorized.

## 2023-07-05 ENCOUNTER — PATIENT MESSAGE (OUTPATIENT)
Dept: PSYCHIATRY | Facility: CLINIC | Age: 78
End: 2023-07-05
Payer: MEDICARE

## 2023-07-05 ENCOUNTER — OFFICE VISIT (OUTPATIENT)
Dept: PSYCHIATRY | Facility: CLINIC | Age: 78
End: 2023-07-05
Payer: MEDICARE

## 2023-07-05 VITALS
HEART RATE: 59 BPM | BODY MASS INDEX: 16.38 KG/M2 | DIASTOLIC BLOOD PRESSURE: 61 MMHG | HEIGHT: 67 IN | SYSTOLIC BLOOD PRESSURE: 100 MMHG | WEIGHT: 104.38 LBS

## 2023-07-05 DIAGNOSIS — F40.298 SPECIFIC PHOBIA: ICD-10-CM

## 2023-07-05 DIAGNOSIS — F33.1 MAJOR DEPRESSIVE DISORDER, RECURRENT, MODERATE: Primary | ICD-10-CM

## 2023-07-05 DIAGNOSIS — F41.0 PANIC DISORDER: ICD-10-CM

## 2023-07-05 DIAGNOSIS — F41.1 GENERALIZED ANXIETY DISORDER: ICD-10-CM

## 2023-07-05 PROCEDURE — 1126F AMNT PAIN NOTED NONE PRSNT: CPT | Mod: CPTII,S$GLB,, | Performed by: PSYCHOLOGIST

## 2023-07-05 PROCEDURE — 3074F PR MOST RECENT SYSTOLIC BLOOD PRESSURE < 130 MM HG: ICD-10-PCS | Mod: CPTII,S$GLB,, | Performed by: PSYCHOLOGIST

## 2023-07-05 PROCEDURE — 1101F PT FALLS ASSESS-DOCD LE1/YR: CPT | Mod: CPTII,S$GLB,, | Performed by: PSYCHOLOGIST

## 2023-07-05 PROCEDURE — 90833 PR PSYCHOTHERAPY W/PATIENT W/E&M, 30 MIN (ADD ON): ICD-10-PCS | Mod: S$GLB,,, | Performed by: PSYCHOLOGIST

## 2023-07-05 PROCEDURE — 3078F DIAST BP <80 MM HG: CPT | Mod: CPTII,S$GLB,, | Performed by: PSYCHOLOGIST

## 2023-07-05 PROCEDURE — 99999 PR PBB SHADOW E&M-EST. PATIENT-LVL III: CPT | Mod: PBBFAC,,, | Performed by: PSYCHOLOGIST

## 2023-07-05 PROCEDURE — 99214 PR OFFICE/OUTPT VISIT, EST, LEVL IV, 30-39 MIN: ICD-10-PCS | Mod: S$GLB,,, | Performed by: PSYCHOLOGIST

## 2023-07-05 PROCEDURE — 1159F PR MEDICATION LIST DOCUMENTED IN MEDICAL RECORD: ICD-10-PCS | Mod: CPTII,S$GLB,, | Performed by: PSYCHOLOGIST

## 2023-07-05 PROCEDURE — 99999 PR PBB SHADOW E&M-EST. PATIENT-LVL III: ICD-10-PCS | Mod: PBBFAC,,, | Performed by: PSYCHOLOGIST

## 2023-07-05 PROCEDURE — 1126F PR PAIN SEVERITY QUANTIFIED, NO PAIN PRESENT: ICD-10-PCS | Mod: CPTII,S$GLB,, | Performed by: PSYCHOLOGIST

## 2023-07-05 PROCEDURE — 90833 PSYTX W PT W E/M 30 MIN: CPT | Mod: S$GLB,,, | Performed by: PSYCHOLOGIST

## 2023-07-05 PROCEDURE — 1157F PR ADVANCE CARE PLAN OR EQUIV PRESENT IN MEDICAL RECORD: ICD-10-PCS | Mod: CPTII,S$GLB,, | Performed by: PSYCHOLOGIST

## 2023-07-05 PROCEDURE — 1157F ADVNC CARE PLAN IN RCRD: CPT | Mod: CPTII,S$GLB,, | Performed by: PSYCHOLOGIST

## 2023-07-05 PROCEDURE — 3074F SYST BP LT 130 MM HG: CPT | Mod: CPTII,S$GLB,, | Performed by: PSYCHOLOGIST

## 2023-07-05 PROCEDURE — 1159F MED LIST DOCD IN RCRD: CPT | Mod: CPTII,S$GLB,, | Performed by: PSYCHOLOGIST

## 2023-07-05 PROCEDURE — 3288F FALL RISK ASSESSMENT DOCD: CPT | Mod: CPTII,S$GLB,, | Performed by: PSYCHOLOGIST

## 2023-07-05 PROCEDURE — 3288F PR FALLS RISK ASSESSMENT DOCUMENTED: ICD-10-PCS | Mod: CPTII,S$GLB,, | Performed by: PSYCHOLOGIST

## 2023-07-05 PROCEDURE — 1101F PR PT FALLS ASSESS DOC 0-1 FALLS W/OUT INJ PAST YR: ICD-10-PCS | Mod: CPTII,S$GLB,, | Performed by: PSYCHOLOGIST

## 2023-07-05 PROCEDURE — 99214 OFFICE O/P EST MOD 30 MIN: CPT | Mod: S$GLB,,, | Performed by: PSYCHOLOGIST

## 2023-07-05 PROCEDURE — 3078F PR MOST RECENT DIASTOLIC BLOOD PRESSURE < 80 MM HG: ICD-10-PCS | Mod: CPTII,S$GLB,, | Performed by: PSYCHOLOGIST

## 2023-07-06 ENCOUNTER — CLINICAL SUPPORT (OUTPATIENT)
Dept: CARDIOLOGY | Facility: HOSPITAL | Age: 78
End: 2023-07-06
Payer: MEDICARE

## 2023-07-06 ENCOUNTER — OFFICE VISIT (OUTPATIENT)
Dept: OPHTHALMOLOGY | Facility: CLINIC | Age: 78
End: 2023-07-06
Payer: MEDICARE

## 2023-07-06 DIAGNOSIS — T85.398A UVEITIS-HYPHEMA-GLAUCOMA SYNDROME OF RIGHT EYE: ICD-10-CM

## 2023-07-06 DIAGNOSIS — Z95.0 PRESENCE OF CARDIAC PACEMAKER: ICD-10-CM

## 2023-07-06 DIAGNOSIS — H40.41X0 UVEITIS-HYPHEMA-GLAUCOMA SYNDROME OF RIGHT EYE: ICD-10-CM

## 2023-07-06 DIAGNOSIS — H40.1112 PRIMARY OPEN ANGLE GLAUCOMA (POAG) OF RIGHT EYE, MODERATE STAGE: Primary | ICD-10-CM

## 2023-07-06 DIAGNOSIS — H40.1121 PRIMARY OPEN-ANGLE GLAUCOMA, LEFT EYE, MILD STAGE: ICD-10-CM

## 2023-07-06 DIAGNOSIS — H20.9 UVEITIS-HYPHEMA-GLAUCOMA SYNDROME OF RIGHT EYE: ICD-10-CM

## 2023-07-06 DIAGNOSIS — I48.91 UNSPECIFIED ATRIAL FIBRILLATION: ICD-10-CM

## 2023-07-06 PROCEDURE — 1157F ADVNC CARE PLAN IN RCRD: CPT | Mod: CPTII,S$GLB,, | Performed by: OPHTHALMOLOGY

## 2023-07-06 PROCEDURE — 93294 CARDIAC DEVICE CHECK - REMOTE: ICD-10-PCS | Mod: ,,, | Performed by: INTERNAL MEDICINE

## 2023-07-06 PROCEDURE — 1157F PR ADVANCE CARE PLAN OR EQUIV PRESENT IN MEDICAL RECORD: ICD-10-PCS | Mod: CPTII,S$GLB,, | Performed by: OPHTHALMOLOGY

## 2023-07-06 PROCEDURE — 3288F FALL RISK ASSESSMENT DOCD: CPT | Mod: CPTII,S$GLB,, | Performed by: OPHTHALMOLOGY

## 2023-07-06 PROCEDURE — 3288F PR FALLS RISK ASSESSMENT DOCUMENTED: ICD-10-PCS | Mod: CPTII,S$GLB,, | Performed by: OPHTHALMOLOGY

## 2023-07-06 PROCEDURE — 1160F RVW MEDS BY RX/DR IN RCRD: CPT | Mod: CPTII,S$GLB,, | Performed by: OPHTHALMOLOGY

## 2023-07-06 PROCEDURE — 1126F PR PAIN SEVERITY QUANTIFIED, NO PAIN PRESENT: ICD-10-PCS | Mod: CPTII,S$GLB,, | Performed by: OPHTHALMOLOGY

## 2023-07-06 PROCEDURE — 1126F AMNT PAIN NOTED NONE PRSNT: CPT | Mod: CPTII,S$GLB,, | Performed by: OPHTHALMOLOGY

## 2023-07-06 PROCEDURE — 99213 PR OFFICE/OUTPT VISIT, EST, LEVL III, 20-29 MIN: ICD-10-PCS | Mod: S$GLB,,, | Performed by: OPHTHALMOLOGY

## 2023-07-06 PROCEDURE — 99999 PR PBB SHADOW E&M-EST. PATIENT-LVL III: CPT | Mod: PBBFAC,,, | Performed by: OPHTHALMOLOGY

## 2023-07-06 PROCEDURE — 1159F MED LIST DOCD IN RCRD: CPT | Mod: CPTII,S$GLB,, | Performed by: OPHTHALMOLOGY

## 2023-07-06 PROCEDURE — 1101F PR PT FALLS ASSESS DOC 0-1 FALLS W/OUT INJ PAST YR: ICD-10-PCS | Mod: CPTII,S$GLB,, | Performed by: OPHTHALMOLOGY

## 2023-07-06 PROCEDURE — 1159F PR MEDICATION LIST DOCUMENTED IN MEDICAL RECORD: ICD-10-PCS | Mod: CPTII,S$GLB,, | Performed by: OPHTHALMOLOGY

## 2023-07-06 PROCEDURE — 1101F PT FALLS ASSESS-DOCD LE1/YR: CPT | Mod: CPTII,S$GLB,, | Performed by: OPHTHALMOLOGY

## 2023-07-06 PROCEDURE — 1160F PR REVIEW ALL MEDS BY PRESCRIBER/CLIN PHARMACIST DOCUMENTED: ICD-10-PCS | Mod: CPTII,S$GLB,, | Performed by: OPHTHALMOLOGY

## 2023-07-06 PROCEDURE — 99213 OFFICE O/P EST LOW 20 MIN: CPT | Mod: S$GLB,,, | Performed by: OPHTHALMOLOGY

## 2023-07-06 PROCEDURE — 99999 PR PBB SHADOW E&M-EST. PATIENT-LVL III: ICD-10-PCS | Mod: PBBFAC,,, | Performed by: OPHTHALMOLOGY

## 2023-07-06 PROCEDURE — 93294 REM INTERROG EVL PM/LDLS PM: CPT | Mod: ,,, | Performed by: INTERNAL MEDICINE

## 2023-07-06 PROCEDURE — 93296 REM INTERROG EVL PM/IDS: CPT | Performed by: INTERNAL MEDICINE

## 2023-07-06 RX ORDER — LORAZEPAM 0.5 MG/1
0.5 TABLET ORAL 2 TIMES DAILY PRN
Qty: 60 TABLET | Refills: 0 | Status: SHIPPED | OUTPATIENT
Start: 2023-07-06 | End: 2023-08-17 | Stop reason: SDUPTHER

## 2023-07-06 NOTE — PROGRESS NOTES
HPI    Pt presents for four month IOP check     States no current ocular complaints.      Dorz/rm BID OU   Atropine BID OD    ATS PRN OU  Last edited by Yamileth Vazquez on 7/6/2023  1:28 PM.            Assessment /Plan     For exam results, see Encounter Report.    Primary open angle glaucoma (POAG) of right eye, moderate stage    Primary open-angle glaucoma, left eye, mild stage    Uveitis-hyphema-glaucoma syndrome of right eye      1. Primary open angle glaucoma (POAG) of right eye, moderate stage  2. Primary open-angle glaucoma, left eye, mild stage  +famhx  Avg pachy    Tmax 20/18 per outside records  Tmax 30+ OD with hyphema  UGH syndrome and IOL malposition OD  Hx of Hyphema OD x 2, requiring washout, Cornea blood staining  HVF moderate damage OD, mild damage OS  OCT NFL damaged OD, normal OS    IOP excellent  Continue  Dorz/rm bid OU    F/u 4 months, IOP check    3. Uveitis-hyphema-glaucoma syndrome of right eye  HX AC washout and surgical iridectomy OD  7/2022  Doing well, AC quiet    Continue atropine BID OD

## 2023-08-01 RX ORDER — BUSPIRONE HYDROCHLORIDE 10 MG/1
10 TABLET ORAL 3 TIMES DAILY
Qty: 270 TABLET | Refills: 1 | Status: SHIPPED | OUTPATIENT
Start: 2023-08-01 | End: 2023-08-17

## 2023-08-01 NOTE — TELEPHONE ENCOUNTER
Pt is requesting medication refill on busPIRone (BUSPAR) 10 MG tablet  Last refill: 05/03/2023  Last visit: 07/05/2023  Follow Up: 08/17/2023

## 2023-08-03 ENCOUNTER — PATIENT MESSAGE (OUTPATIENT)
Dept: PSYCHIATRY | Facility: CLINIC | Age: 78
End: 2023-08-03
Payer: MEDICARE

## 2023-08-17 ENCOUNTER — OFFICE VISIT (OUTPATIENT)
Dept: PSYCHIATRY | Facility: CLINIC | Age: 78
End: 2023-08-17
Payer: MEDICARE

## 2023-08-17 VITALS
HEART RATE: 60 BPM | BODY MASS INDEX: 16.95 KG/M2 | DIASTOLIC BLOOD PRESSURE: 65 MMHG | WEIGHT: 108 LBS | SYSTOLIC BLOOD PRESSURE: 123 MMHG | HEIGHT: 67 IN

## 2023-08-17 DIAGNOSIS — F40.298 SPECIFIC PHOBIA: ICD-10-CM

## 2023-08-17 DIAGNOSIS — F41.0 PANIC DISORDER: ICD-10-CM

## 2023-08-17 DIAGNOSIS — F41.1 GENERALIZED ANXIETY DISORDER: ICD-10-CM

## 2023-08-17 DIAGNOSIS — F33.1 MAJOR DEPRESSIVE DISORDER, RECURRENT, MODERATE: Primary | ICD-10-CM

## 2023-08-17 PROCEDURE — 1101F PR PT FALLS ASSESS DOC 0-1 FALLS W/OUT INJ PAST YR: ICD-10-PCS | Mod: CPTII,S$GLB,, | Performed by: PSYCHOLOGIST

## 2023-08-17 PROCEDURE — 1126F PR PAIN SEVERITY QUANTIFIED, NO PAIN PRESENT: ICD-10-PCS | Mod: CPTII,S$GLB,, | Performed by: PSYCHOLOGIST

## 2023-08-17 PROCEDURE — 99999 PR PBB SHADOW E&M-EST. PATIENT-LVL III: ICD-10-PCS | Mod: PBBFAC,,, | Performed by: PSYCHOLOGIST

## 2023-08-17 PROCEDURE — 1159F MED LIST DOCD IN RCRD: CPT | Mod: CPTII,S$GLB,, | Performed by: PSYCHOLOGIST

## 2023-08-17 PROCEDURE — 1126F AMNT PAIN NOTED NONE PRSNT: CPT | Mod: CPTII,S$GLB,, | Performed by: PSYCHOLOGIST

## 2023-08-17 PROCEDURE — 3288F PR FALLS RISK ASSESSMENT DOCUMENTED: ICD-10-PCS | Mod: CPTII,S$GLB,, | Performed by: PSYCHOLOGIST

## 2023-08-17 PROCEDURE — 3074F SYST BP LT 130 MM HG: CPT | Mod: CPTII,S$GLB,, | Performed by: PSYCHOLOGIST

## 2023-08-17 PROCEDURE — 3288F FALL RISK ASSESSMENT DOCD: CPT | Mod: CPTII,S$GLB,, | Performed by: PSYCHOLOGIST

## 2023-08-17 PROCEDURE — 90833 PSYTX W PT W E/M 30 MIN: CPT | Mod: S$GLB,,, | Performed by: PSYCHOLOGIST

## 2023-08-17 PROCEDURE — 3074F PR MOST RECENT SYSTOLIC BLOOD PRESSURE < 130 MM HG: ICD-10-PCS | Mod: CPTII,S$GLB,, | Performed by: PSYCHOLOGIST

## 2023-08-17 PROCEDURE — 1159F PR MEDICATION LIST DOCUMENTED IN MEDICAL RECORD: ICD-10-PCS | Mod: CPTII,S$GLB,, | Performed by: PSYCHOLOGIST

## 2023-08-17 PROCEDURE — 1157F PR ADVANCE CARE PLAN OR EQUIV PRESENT IN MEDICAL RECORD: ICD-10-PCS | Mod: CPTII,S$GLB,, | Performed by: PSYCHOLOGIST

## 2023-08-17 PROCEDURE — 99214 OFFICE O/P EST MOD 30 MIN: CPT | Mod: S$GLB,,, | Performed by: PSYCHOLOGIST

## 2023-08-17 PROCEDURE — 1157F ADVNC CARE PLAN IN RCRD: CPT | Mod: CPTII,S$GLB,, | Performed by: PSYCHOLOGIST

## 2023-08-17 PROCEDURE — 99214 PR OFFICE/OUTPT VISIT, EST, LEVL IV, 30-39 MIN: ICD-10-PCS | Mod: S$GLB,,, | Performed by: PSYCHOLOGIST

## 2023-08-17 PROCEDURE — 99999 PR PBB SHADOW E&M-EST. PATIENT-LVL III: CPT | Mod: PBBFAC,,, | Performed by: PSYCHOLOGIST

## 2023-08-17 PROCEDURE — 90833 PR PSYCHOTHERAPY W/PATIENT W/E&M, 30 MIN (ADD ON): ICD-10-PCS | Mod: S$GLB,,, | Performed by: PSYCHOLOGIST

## 2023-08-17 PROCEDURE — 1101F PT FALLS ASSESS-DOCD LE1/YR: CPT | Mod: CPTII,S$GLB,, | Performed by: PSYCHOLOGIST

## 2023-08-17 PROCEDURE — 3078F DIAST BP <80 MM HG: CPT | Mod: CPTII,S$GLB,, | Performed by: PSYCHOLOGIST

## 2023-08-17 PROCEDURE — 3078F PR MOST RECENT DIASTOLIC BLOOD PRESSURE < 80 MM HG: ICD-10-PCS | Mod: CPTII,S$GLB,, | Performed by: PSYCHOLOGIST

## 2023-08-17 RX ORDER — VORTIOXETINE 20 MG/1
20 TABLET, FILM COATED ORAL DAILY
Qty: 30 TABLET | Refills: 3 | Status: SHIPPED | OUTPATIENT
Start: 2023-08-17 | End: 2023-12-13 | Stop reason: SDUPTHER

## 2023-08-17 RX ORDER — BUSPIRONE HYDROCHLORIDE 5 MG/1
5 TABLET ORAL 3 TIMES DAILY
Qty: 90 TABLET | Refills: 1 | Status: SHIPPED | OUTPATIENT
Start: 2023-08-17 | End: 2023-09-20 | Stop reason: SDUPTHER

## 2023-08-17 RX ORDER — LORAZEPAM 0.5 MG/1
0.5 TABLET ORAL 2 TIMES DAILY PRN
Qty: 60 TABLET | Refills: 0 | Status: SHIPPED | OUTPATIENT
Start: 2023-08-17 | End: 2023-09-20 | Stop reason: SDUPTHER

## 2023-08-17 NOTE — PROGRESS NOTES
Outpatient Psychiatry Follow-Up Visit    Clinical Status of Patient: Outpatient (Ambulatory)  08/17/2023     Chief Complaint: 78 y/o female presenting today with  for a follow-up.       Interval History and Content of Current Session:  Interim Events/Subjective Report/Content of Current Session: 78 y/o female follow-up appointment.    Pt is a 78 y/o female with past psychiatric hx of depression, anxiety, eating disorder who presents for follow-up treatment. Pt reported that she was able to D/C amitriptyline without complications. Continues to have dry mouth but is drinking more water. Pt's  stated that she has been increasing her caloric intake and gaining weight. Pt's  stated that she seems to be more responsive verbally and nonverbally, which has also been noticed be her friends. Pt reported that she continues to have some anxiety in the morning but denied any mood concerns.     Past Psychiatric hx: remeron, rexulti, vraylar, Limbitrol, venlafaxine, fluoxetine, gabapentin, cannot remember others.     Past Medical hx:   Past Medical History:   Diagnosis Date    Anticoagulant long-term use     Anxiety     Arthritis     Atrial fibrillation     Cancer     skin    CHF (congestive heart failure)     Depression     DVT (deep venous thrombosis)     GERD (gastroesophageal reflux disease)     Glaucoma (increased eye pressure)     Hyperlipidemia     diet controlled    Hypertension     Interstitial lung disease     Localized hives 01/10/2020    Mild persistent asthma without complication 11/12/2018    Pacemaker     Pneumonia 01/31/2014    Stroke 06/03/2014    Stroke     TIA (transient ischemic attack)     TIA (transient ischemic attack)         Interim hx:  Medication changes last visit: D/C Amitriptyline   Anxiety: decrease  Depression: decrease     Denies suicidal/homicidal ideations.  Denies hopelessness/worthlessness.    Denies auditory/visual hallucinations      Alcohol: Infrequent use  Drug: Pt  denied  Caffeine: Not assessed  Tobacco: Pt denied      Review of Systems   PSYCHIATRIC: Pertinent items are noted in the narrative.        CONSTITUTIONAL: weight stable    Past Medical, Family and Social History: The patient's past medical, family and social history have been reviewed and updated as appropriate within the electronic medical record. See encounter notes.     Current Psychiatric Medication:  ativan 0.5 mg qd PRN, buspirone 10 mg TID, trintillex 20 mg     Compliance: yes      Side effects: Pt denied     Risk Parameters:  Patient reports no suicidal ideation  Patient reports no homicidal ideation  Patient reports no self-injurious behavior  Patient reports no violent behavior     Exam (detailed: at least 9 elements; comprehensive: all 15 elements)   Constitutional  Vitals:  Most recent vital signs, dated less than 90 days prior to this appointment, were reviewed.        General:  unremarkable, age appropriate, casual attire, good eye contact, good rapport       Musculoskeletal  Muscle Strength/Tone:  no flaccidity, no tremor    Gait & Station:  normal      Psychiatric                       Speech:  normal tone, normal rate, rhythm, and volume   Mood & Affect:   Mildly Depressed, anxious         Thought Process:   Goal directed; Linear    Associations:   intact   Thought Content:   No SI/HI, delusions, or paranoia, no AV/VH   Insight & Judgement:   Good, adequate to circumstances   Orientation:   grossly intact; alert and oriented x 4    Memory:  intact for content of interview    Language:  grossly intact, can repeat    Attention Span  : Grossly intact for content of interview   Fund of Knowledge:   intact and appropriate to age and level of education        Assessment and Diagnosis   Status/Progress: Based on the examination today, the patient's problem(s) is/are adequately controlled.  New problems have not been presented today. Co-morbidities are not complicating management of the primary condition.  There are no active rule-out diagnoses for this patient at this time.      Impression: Pt continues to improve overall and responding well to reducing polypharmacy and medication dosing. Gait has improved. Increased verbal responses in session. Will reduce buspirone to 5 mg TID and continue with other medications as prescribed.     Diagnosis:   1) Major Depressive Disorder, recurrent, moderate  2) Generalized Anxiety Disorder  3) Specific Phobia  4) Panic Disorder   5) Personality Disorder traits  6) R/o Dementia of unknown etiology  Intervention/Counseling/Treatment Plan   Medication Management:      1. ativan 0.5 mg qd PRN    2. Reduce buspirone to 5 mg TID    3. trintillex to 20 mg    4. Call to report any worsening of symptoms or problems with the medication. Pt instructed to go to ER with thoughts of harming self, others     5. Cont in therapy with Graeme Meyer LCSW    Psychotherapy: 20 minutes   Target symptoms: anxiety, weight loss  Why chosen therapy is appropriate versus another modality: CBT used; relevant to diagnosis, patient responds to this modality  Outcome monitoring methods: self-report, observation  Therapeutic intervention type: Cognitive Behavioral Therapy  Topics discussed/themes: building skills sets for symptom management, symptom recognition, nutrition, exercise  The patient's response to the intervention is accepting  Patient's response to treatment is: good.   The patient's progress toward treatment goals: limited to fair     Return to clinic: 1 month    -Cognitive-Behavioral/Supportive therapy and psychoeducation provided  -R/B/SE's of medications discussed with the pt who expresses understanding and chooses to take medications as prescribed.   -Pt instructed to call clinic, 911 or go to nearest emergency room if sxs worsen or pt is in   crisis. The pt expresses understanding.    Galo Lipscomb, PhD, MP     Antidepressant/Antianxiety Medication Initiation:  Patient informed of risks,  benefits, and potential side effects of medication and accepts informed consent.  Common side effects include nausea, fatigue, headache, insomnia., Specifically discussed the possibility of new or worsening suicidal thoughts/depression.  Patient instructed to stop the medication immediately and seek urgent treatment if this occurs. Patient instructed not to abruptly discontinue medication without physician guidance except in cases of sudden onset or worsening of SI.       Stimulant Medication Initiation:  Patient advised of risks, benefits, and side effects of medication and accepts informed consent.  Common side effects include insomnia, irritability, jittery feeling, dry mouth, and agitation/hostility., Patient advised of potential addictive nature of medication and controlled substance classification.  Instructed to safeguard medication as no early refills can be given for lost or stolen medications.       Benzodiazepine Initiation:  Patient advised of the risks, benefits, and common side effects of medication and has accepted informed consent.  Common side effects include drowsiness, impaired coordination, possible memory loss., Patient advised NOT to operate a vehicle or machinery untiil they are sure how the medication will affec them.  Client also advised of danger of mixing this medication with alcohol., Patient advised of potential addictive nature of medication and need to safeguard medication as no early refills for lost or stolen medications can be authorized.

## 2023-08-25 ENCOUNTER — TELEPHONE (OUTPATIENT)
Dept: ELECTROPHYSIOLOGY | Facility: CLINIC | Age: 78
End: 2023-08-25
Payer: MEDICARE

## 2023-08-25 NOTE — TELEPHONE ENCOUNTER
Returned call to pt. Pt stated she had a panic attack yesterday and stayed out of rhythm.  HR varied from . Today her HR is steady at 68.  Encouraged pt to continue to monitor. If episodes of AFIB become more frequent to contact the office.      ----- Message from Robert Gómez MA sent at 8/24/2023  4:29 PM CDT -----  Regarding: FW: Varies Blood Pressure    ----- Message -----  From: Osmani Ramirez  Sent: 8/24/2023   4:23 PM CDT  To: [facility]  Subject: Varies Blood Pressure                            Pt is experiencing varies blood pressure readings and would like a callback.    Contact @ [phone]    Thanks

## 2023-08-28 ENCOUNTER — OFFICE VISIT (OUTPATIENT)
Dept: DERMATOLOGY | Facility: CLINIC | Age: 78
End: 2023-08-28
Payer: MEDICARE

## 2023-08-28 VITALS — HEIGHT: 67 IN | BODY MASS INDEX: 16.95 KG/M2 | WEIGHT: 108 LBS

## 2023-08-28 DIAGNOSIS — Z85.828 ENCOUNTER FOR FOLLOW-UP SURVEILLANCE OF SKIN CANCER: ICD-10-CM

## 2023-08-28 DIAGNOSIS — D18.01 CHERRY ANGIOMA: ICD-10-CM

## 2023-08-28 DIAGNOSIS — Z08 ENCOUNTER FOR FOLLOW-UP SURVEILLANCE OF SKIN CANCER: ICD-10-CM

## 2023-08-28 DIAGNOSIS — L81.4 SOLAR LENTIGO: ICD-10-CM

## 2023-08-28 DIAGNOSIS — D48.5 NEOPLASM OF UNCERTAIN BEHAVIOR OF SKIN: Primary | ICD-10-CM

## 2023-08-28 DIAGNOSIS — L82.1 SEBORRHEIC KERATOSES: ICD-10-CM

## 2023-08-28 PROCEDURE — 1159F PR MEDICATION LIST DOCUMENTED IN MEDICAL RECORD: ICD-10-PCS | Mod: CPTII,S$GLB,, | Performed by: DERMATOLOGY

## 2023-08-28 PROCEDURE — 88305 TISSUE EXAM BY PATHOLOGIST: CPT | Performed by: PATHOLOGY

## 2023-08-28 PROCEDURE — 3288F FALL RISK ASSESSMENT DOCD: CPT | Mod: CPTII,S$GLB,, | Performed by: DERMATOLOGY

## 2023-08-28 PROCEDURE — 1101F PT FALLS ASSESS-DOCD LE1/YR: CPT | Mod: CPTII,S$GLB,, | Performed by: DERMATOLOGY

## 2023-08-28 PROCEDURE — 3288F PR FALLS RISK ASSESSMENT DOCUMENTED: ICD-10-PCS | Mod: CPTII,S$GLB,, | Performed by: DERMATOLOGY

## 2023-08-28 PROCEDURE — 1160F PR REVIEW ALL MEDS BY PRESCRIBER/CLIN PHARMACIST DOCUMENTED: ICD-10-PCS | Mod: CPTII,S$GLB,, | Performed by: DERMATOLOGY

## 2023-08-28 PROCEDURE — 99213 PR OFFICE/OUTPT VISIT, EST, LEVL III, 20-29 MIN: ICD-10-PCS | Mod: 25,S$GLB,, | Performed by: DERMATOLOGY

## 2023-08-28 PROCEDURE — 88305 TISSUE EXAM BY PATHOLOGIST: ICD-10-PCS | Mod: 26,,, | Performed by: PATHOLOGY

## 2023-08-28 PROCEDURE — 99213 OFFICE O/P EST LOW 20 MIN: CPT | Mod: 25,S$GLB,, | Performed by: DERMATOLOGY

## 2023-08-28 PROCEDURE — 88305 TISSUE EXAM BY PATHOLOGIST: CPT | Mod: 26,,, | Performed by: PATHOLOGY

## 2023-08-28 PROCEDURE — 1159F MED LIST DOCD IN RCRD: CPT | Mod: CPTII,S$GLB,, | Performed by: DERMATOLOGY

## 2023-08-28 PROCEDURE — 1157F ADVNC CARE PLAN IN RCRD: CPT | Mod: CPTII,S$GLB,, | Performed by: DERMATOLOGY

## 2023-08-28 PROCEDURE — 1101F PR PT FALLS ASSESS DOC 0-1 FALLS W/OUT INJ PAST YR: ICD-10-PCS | Mod: CPTII,S$GLB,, | Performed by: DERMATOLOGY

## 2023-08-28 PROCEDURE — 1157F PR ADVANCE CARE PLAN OR EQUIV PRESENT IN MEDICAL RECORD: ICD-10-PCS | Mod: CPTII,S$GLB,, | Performed by: DERMATOLOGY

## 2023-08-28 PROCEDURE — 11102 TANGNTL BX SKIN SINGLE LES: CPT | Mod: S$GLB,,, | Performed by: DERMATOLOGY

## 2023-08-28 PROCEDURE — 1160F RVW MEDS BY RX/DR IN RCRD: CPT | Mod: CPTII,S$GLB,, | Performed by: DERMATOLOGY

## 2023-08-28 PROCEDURE — 11102 PR TANGENTIAL BIOPSY, SKIN, SINGLE LESION: ICD-10-PCS | Mod: S$GLB,,, | Performed by: DERMATOLOGY

## 2023-08-28 NOTE — PATIENT INSTRUCTIONS
Shave Biopsy Wound Care    Your doctor has performed a shave biopsy today.  A band aid and vaseline ointment has been placed over the site.  This should remain in place for 24 hours.  It is recommended that you keep the area dry for the first 24 hours.  After 24 hours, you may remove the band aid and wash the area with warm soap and water and apply Vaseline jelly.  Many patients prefer to use Neosporin or Bacitracin ointment.  This is acceptable; however, know that you can develop an allergy to this medication even if you have used it safely for years.  It is important to keep the area moist.  Letting it dry out and get air slows healing time, and will worsen the scar.  Band aid is optional after first 24 hours.      If you notice increasing redness, tenderness, pain, or yellow drainage at the biopsy site, please notify your doctor.  These are signs of an infection.    If your biopsy site is bleeding, apply firm pressure for 15 minutes straight.  Repeat for another 15 minutes, if it is still bleeding.   If the surgical site continues to bleed, then please contact your doctor.       [facility] - DERMATOLOGY  [address], [address]  [facility] [address]  Dept: [phone]  Dept Fax: [phone]

## 2023-08-28 NOTE — PROGRESS NOTES
Subjective:      Patient ID:  Ayla Dela Cruz is a 78 y.o. female who presents for   Chief Complaint   Patient presents with    Skin Check     TBSC      LOV 3/15/23 (Amira) Hx of MOHS micrographic surgery for skin cancer    1.  Skin, left upper cutaneous lip, shave biopsy:   -BASAL CELL CARCINOMA, NODULAR AND INFILTRATIVE, EXTENDING TO THE DEEP AND A   PERIPHERAL MARGIN   This lesion is skin cancer. You will be contacted regarding treatment.     2. Skin, left upper back, shave biopsy:   -BASAL CELL CARCINOMA,NODULAR AND INFILTRATIVE, EXCISED IN THE PLANES OF   SECTIONS EXAMINED     Patient here today for TBSC    Derm HX:  BCC left upper lip- 10/2022 , MOHs with  01/2023  BCC left upper back- 10/2022 , monitoring  Denies Fhx of MM     Current Outpatient Medications:   ·  apixaban (ELIQUIS) 5 mg Tab, Take 1 tablet (5 mg total) by mouth 2 (two) times daily., Disp: 60 tablet, Rfl: 11  ·  aspirin 81 MG Chew, TAKE 1 TABLET BY MOUTH EVERY DAY, Disp: 90 tablet, Rfl: 4  ·  atropine 1% (ISOPTO ATROPINE) 1 % Drop, Place 1 drop into the right eye 2 (two) times a day., Disp: 5 mL, Rfl: 6  ·  busPIRone (BUSPAR) 5 MG Tab, Take 1 tablet (5 mg total) by mouth 3 (three) times daily., Disp: 90 tablet, Rfl: 1  ·  dorzolamide-timolol 2-0.5% (COSOPT) 22.3-6.8 mg/mL ophthalmic solution, Place 1 drop into both eyes 3 (three) times daily., Disp: 20 mL, Rfl: 11  ·  LORazepam (ATIVAN) 0.5 MG tablet, Take 1 tablet (0.5 mg total) by mouth 2 (two) times daily as needed for Anxiety., Disp: 60 tablet, Rfl: 0  ·  metoprolol tartrate (LOPRESSOR) 25 MG tablet, TAKE 1 TABLET BY MOUTH TWICE A DAY, Disp: 180 tablet, Rfl: 3  ·  pantoprazole (PROTONIX) 40 MG tablet, Take 1 tablet (40 mg total) by mouth once daily., Disp: 90 tablet, Rfl: 3  ·  vortioxetine (TRINTELLIX) 20 mg Tab, Take 1 tablet (20 mg total) by mouth Daily., Disp: 30 tablet, Rfl: 3        Review of Systems   Constitutional:  Negative for fever,  chills and fatigue.   Skin:  Positive for dry skin. Negative for daily sunscreen use and activity-related sunscreen use.   Hematologic/Lymphatic: Bruises/bleeds easily.       Objective:   Physical Exam   Constitutional: She appears well-developed and well-nourished. No distress.   Neurological: She is alert and oriented to person, place, and time. She is not disoriented.   Psychiatric: She has a normal mood and affect.   Skin:   Areas Examined (abnormalities noted in diagram):   Scalp / Hair Palpated and Inspected  Head / Face Inspection Performed  Neck Inspection Performed  Chest / Axilla Inspection Performed  Abdomen Inspection Performed  Genitals / Buttocks / Groin Inspection Performed  Back Inspection Performed  RUE Inspected  LUE Inspection Performed  RLE Inspected  LLE Inspection Performed  Nails and Digits Inspection Performed                     Diagram Legend     Erythematous scaling macule/papule c/w actinic keratosis       Vascular papule c/w angioma      Pigmented verrucoid papule/plaque c/w seborrheic keratosis      Yellow umbilicated papule c/w sebaceous hyperplasia      Irregularly shaped tan macule c/w lentigo     1-2 mm smooth white papules consistent with Milia      Movable subcutaneous cyst with punctum c/w epidermal inclusion cyst      Subcutaneous movable cyst c/w pilar cyst      Firm pink to brown papule c/w dermatofibroma      Pedunculated fleshy papule(s) c/w skin tag(s)      Evenly pigmented macule c/w junctional nevus     Mildly variegated pigmented, slightly irregular-bordered macule c/w mildly atypical nevus      Flesh colored to evenly pigmented papule c/w intradermal nevus       Pink pearly papule/plaque c/w basal cell carcinoma      Erythematous hyperkeratotic cursted plaque c/w SCC      Surgical scar with no sign of skin cancer recurrence      Open and closed comedones      Inflammatory papules and pustules      Verrucoid papule consistent consistent with wart     Erythematous  eczematous patches and plaques     Dystrophic onycholytic nail with subungual debris c/w onychomycosis     Umbilicated papule    Erythematous-base heme-crusted tan verrucoid plaque consistent with inflamed seborrheic keratosis     Erythematous Silvery Scaling Plaque c/w Psoriasis     See annotation            Assessment / Plan:      Pathology Orders:       Normal Orders This Visit    Specimen to Pathology, Dermatology     Comments:    Number of Specimens:->1  ------------------------->-------------------------  Spec 1 Procedure:->Biopsy  Spec 1 Clinical Impression:->BCC vs other  Spec 1 Source:->left superior shoulder    Questions:    Procedure Type: Dermatology and skin neoplasms    Number of Specimens: 1    ------------------------: -------------------------    Spec 1 Procedure: Biopsy    Spec 1 Clinical Impression: BCC vs other    Spec 1 Source: left superior shoulder    Release to patient:           Neoplasm of uncertain behavior of skin  -     Specimen to Pathology, Dermatology  Shave biopsy procedure note:    Shave biopsy performed after verbal consent including risk of infection, scar, recurrence, need for additional treatment of site. Area prepped with alcohol, anesthetized with approximately 1.0cc of 1% lidocaine with epinephrine. Lesional tissue shaved with razor blade. Hemostasis achieved with application of aluminum chloride followed by silver nitrate (pacemaker). No complications. Dressing applied. Wound care explained.    Encounter for follow-up surveillance of skin cancer  Area of previous BCC (left upper cut lip and left upper back) examined. Site well healed with no signs of recurrence.    Total body skin examination performed today including at least 12 points as noted in physical examination. No lesions suspicious for malignancy noted.    Seborrheic keratoses  These are benign inherited growths without a malignant potential. Reassurance given to patient. No treatment is necessary.     Solar  lentigo  This is a benign hyperpigmented sun induced lesion. Daily sun protection will reduce the number of new lesions. Treatment of these benign lesions are considered cosmetic.    Cherry angioma  This is a benign vascular lesion. Reassurance given. No treatment required.   Patient instructed in importance in daily broad spectrum sun protection of at least spf 30. Mineral sunscreen ingredients preferred (Zinc +/- Titanium) and can be found OTC.   Recommend Elta MD for daily use on face and neck.  Patient encouraged to wear hat for all outdoor exposure.   Also discussed sun avoidance and use of protective clothing.             Follow up in about 6 months (around 2/28/2024).

## 2023-08-30 LAB
FINAL PATHOLOGIC DIAGNOSIS: NORMAL
GROSS: NORMAL
Lab: NORMAL
MICROSCOPIC EXAM: NORMAL

## 2023-09-18 ENCOUNTER — OFFICE VISIT (OUTPATIENT)
Dept: PULMONOLOGY | Facility: CLINIC | Age: 78
End: 2023-09-18
Payer: MEDICARE

## 2023-09-18 VITALS
HEIGHT: 67 IN | HEART RATE: 77 BPM | DIASTOLIC BLOOD PRESSURE: 63 MMHG | OXYGEN SATURATION: 97 % | BODY MASS INDEX: 17.11 KG/M2 | SYSTOLIC BLOOD PRESSURE: 114 MMHG | WEIGHT: 109 LBS

## 2023-09-18 DIAGNOSIS — G47.30 SLEEP APNEA, UNSPECIFIED TYPE: ICD-10-CM

## 2023-09-18 DIAGNOSIS — R06.83 SNORING: ICD-10-CM

## 2023-09-18 DIAGNOSIS — J45.20 MILD INTERMITTENT ASTHMA WITHOUT COMPLICATION: ICD-10-CM

## 2023-09-18 DIAGNOSIS — I27.20 PULMONARY HYPERTENSION: Primary | ICD-10-CM

## 2023-09-18 PROCEDURE — 3288F PR FALLS RISK ASSESSMENT DOCUMENTED: ICD-10-PCS | Mod: CPTII,S$GLB,, | Performed by: INTERNAL MEDICINE

## 2023-09-18 PROCEDURE — 3074F SYST BP LT 130 MM HG: CPT | Mod: CPTII,S$GLB,, | Performed by: INTERNAL MEDICINE

## 2023-09-18 PROCEDURE — 1159F MED LIST DOCD IN RCRD: CPT | Mod: CPTII,S$GLB,, | Performed by: INTERNAL MEDICINE

## 2023-09-18 PROCEDURE — 1157F PR ADVANCE CARE PLAN OR EQUIV PRESENT IN MEDICAL RECORD: ICD-10-PCS | Mod: CPTII,S$GLB,, | Performed by: INTERNAL MEDICINE

## 2023-09-18 PROCEDURE — 3078F DIAST BP <80 MM HG: CPT | Mod: CPTII,S$GLB,, | Performed by: INTERNAL MEDICINE

## 2023-09-18 PROCEDURE — 99214 PR OFFICE/OUTPT VISIT, EST, LEVL IV, 30-39 MIN: ICD-10-PCS | Mod: S$GLB,,, | Performed by: INTERNAL MEDICINE

## 2023-09-18 PROCEDURE — 99999 PR PBB SHADOW E&M-EST. PATIENT-LVL III: ICD-10-PCS | Mod: PBBFAC,,, | Performed by: INTERNAL MEDICINE

## 2023-09-18 PROCEDURE — 1101F PR PT FALLS ASSESS DOC 0-1 FALLS W/OUT INJ PAST YR: ICD-10-PCS | Mod: CPTII,S$GLB,, | Performed by: INTERNAL MEDICINE

## 2023-09-18 PROCEDURE — 3078F PR MOST RECENT DIASTOLIC BLOOD PRESSURE < 80 MM HG: ICD-10-PCS | Mod: CPTII,S$GLB,, | Performed by: INTERNAL MEDICINE

## 2023-09-18 PROCEDURE — 1159F PR MEDICATION LIST DOCUMENTED IN MEDICAL RECORD: ICD-10-PCS | Mod: CPTII,S$GLB,, | Performed by: INTERNAL MEDICINE

## 2023-09-18 PROCEDURE — 1126F PR PAIN SEVERITY QUANTIFIED, NO PAIN PRESENT: ICD-10-PCS | Mod: CPTII,S$GLB,, | Performed by: INTERNAL MEDICINE

## 2023-09-18 PROCEDURE — 1126F AMNT PAIN NOTED NONE PRSNT: CPT | Mod: CPTII,S$GLB,, | Performed by: INTERNAL MEDICINE

## 2023-09-18 PROCEDURE — 3288F FALL RISK ASSESSMENT DOCD: CPT | Mod: CPTII,S$GLB,, | Performed by: INTERNAL MEDICINE

## 2023-09-18 PROCEDURE — 3074F PR MOST RECENT SYSTOLIC BLOOD PRESSURE < 130 MM HG: ICD-10-PCS | Mod: CPTII,S$GLB,, | Performed by: INTERNAL MEDICINE

## 2023-09-18 PROCEDURE — 1157F ADVNC CARE PLAN IN RCRD: CPT | Mod: CPTII,S$GLB,, | Performed by: INTERNAL MEDICINE

## 2023-09-18 PROCEDURE — 99214 OFFICE O/P EST MOD 30 MIN: CPT | Mod: S$GLB,,, | Performed by: INTERNAL MEDICINE

## 2023-09-18 PROCEDURE — 99999 PR PBB SHADOW E&M-EST. PATIENT-LVL III: CPT | Mod: PBBFAC,,, | Performed by: INTERNAL MEDICINE

## 2023-09-18 PROCEDURE — 1101F PT FALLS ASSESS-DOCD LE1/YR: CPT | Mod: CPTII,S$GLB,, | Performed by: INTERNAL MEDICINE

## 2023-09-18 NOTE — PATIENT INSTRUCTIONS
Continue follow up with cardiology for pulmonary hypertension and heart rhythm issues  Home sleep study recommended and treatment with CPAP if abnormal.

## 2023-09-18 NOTE — PROGRESS NOTES
9/18/2023    Ayla Dela Cruz  Office Note    Chief Complaint   Patient presents with    Asthma       HPI:   09/18/2023- pt previously followed by Marichuy Mcelroy and saw me several yrs ago. She has HFpEF, pulmonary HTN, atrial fib, mild asthma, hx DVT on eliquis.   She has not had sleep study. She snores a lot. Denies daytime fatigue but dozes off during the day. Denies nocturnal arousals.  Overnight pulse ox study 9/2022 showed min sat 89% on RA.  Spouse is present and speaks for her- states she needs a check up. She is breathing well. Not needing inhalers. Denies cough, wheezing, respiratory infections.  When she walks she gets out of breath- from car to [employer]. This is about the same over time.  She feels heartbeat going faster about once monthly. She has a pacemaker and followed by Dr. Jurado.    12/9/2022- complaint of shortness of breath, varies in severity, followed by cardiologist. Difficult to walk even short distances with out stopping to rest, improves with rest.   Stopped Trelegy due to oral thrush.     Did not have Sleep study not sure why, had overnight pulse ox with no desaturation.     8/9/2022- recent right eye surgery July 2022; holding Trelegy due to inhaled steroid. Cough is resolved.    states loud snoring at night, complaint of daytime fatigue, worsening with time. Wakes up with generalized body aches and numbness in lower arms.     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 Has resolved. Followed by Dr. Jurado and Dr. Yeh cardiologists. Noticed breathing improved following procedure.  SOB- stable, not needing albuterol inhaler. Able to due daily activities with out stopping to rest as long as she takes her time. Stopped Trelegy for now.   Cough- recurrent complaint, non productive, no current cough.   Able to play flute with difficulty.    8/6/2021- states feeling well, Cough- decreased, states  mild, associated with post nasal drip, recurrent complaint, non productive, using Trelegy most days with noticed benfit,   Shortness of breath- recurrent complaint, improves with albuterol rescue inhaler but not needing, going to gym to exercise with no difficulty.   GERD symptoms controlled on medications.     1/26/21-  Complaint of depression currently followed by Psychiatry. Depression medications worsens acid reflux. GERD worsens Asthma symptoms.   Cough- worsened since changing medications, causes gaging,voice is hoarse in last 2 weeks. Occasionally productive clear mucous.   Associated with chest tightness.  No wheeze, SOB- worsened, worse when changing positions, feels palpitations in chest.     07/21/2020- patient is a 75-year-old female with atrial fib, DVT (2014) on pradaxa, kidney infarction, history of stroke, GERD, hyperlipidemia who follows with Dr. Bryson for asthma and chronic sinusitis.  She c/o chronic AVILA especially if she has to walk uphill or push baby stroller. She is improved after using breo. She had URI symptoms in 1/2020 and thinks she may have had COVID-19 with emesis, loss of smell and tasted, aching for about 3 wks.  She had antibody test which was negative. Her  was sick as well. He assists w/ history.  She is a never smoker but says she has been diagnosed with COPD. Sinuses doing well, uses flonase intermittently. She follows w/ cardiology, has had 3 ablations in the past and sometimes still has a fib.  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    Mark 15, 2019- breo  With  No rescue use, uses flonase, had syncope with  Ankle  Fx.      Oct 16, 2018 pt having sob.  Pt had asthma as child and outgrew.  Pt has had avila 2 yrs, no seasonal variation, no clear nocturnal worsening.  Pt has been on intermittent amiodarone with eval /rx Dr Spaulding.  Pt had a fib resolved.  pft in 2014 with vc 35%.  Pt had 3 ablations. On chr xarelto.  No h/o pe.  No  edema.  On aldactone.  Pt had transient right paresis - resolved - tia/stroke.    Pt in er 10/14- no wheezes, place empirically on prednisone 40/d with good response.  With albuterol use once breathing felt nl - 1st within last 1-2 yrs.        The chief complaint problem is stable    PFSH:  Past Medical History:   Diagnosis Date    Anticoagulant long-term use     Anxiety     Arthritis     Atrial fibrillation     Cancer     skin    CHF (congestive heart failure)     Depression     DVT (deep venous thrombosis)     GERD (gastroesophageal reflux disease)     Glaucoma (increased eye pressure)     Hyperlipidemia     diet controlled    Hypertension     Interstitial lung disease     Localized hives 01/10/2020    Mild persistent asthma without complication 11/12/2018    Pacemaker     Pneumonia 01/31/2014    Stroke 06/03/2014    Stroke     TIA (transient ischemic attack)     TIA (transient ischemic attack)          Past Surgical History:   Procedure Laterality Date    2 heart ablations      3 in total    A-V CARDIAC PACEMAKER INSERTION Left 10/14/2021    Procedure: INSERTION, CARDIAC PACEMAKER, DUAL CHAMBER;  Surgeon: Fco Calderon MD;  Location: [facility];  Service: Cardiology;  Laterality: Left;  PAF/ Bullard Arrthymias, Dual PPM, SJM, MAC, GP, 3 PREP    ANTERIOR VITRECTOMY Right 7/27/2022    Procedure: VITRECTOMY, ANTERIOR APPROACH;  Surgeon: Daniel Tan MD;  Location: [facility] OR;  Service: Ophthalmology;  Laterality: Right;    bilateral cataracts      CHOLECYSTECTOMY      COLONOSCOPY N/A 1/19/2017    Procedure: COLONOSCOPY and EGD;  Surgeon: Jonathan Schultz MD;  Location: [facility];  Service: Endoscopy;  Laterality: N/A;    COLONOSCOPY N/A 10/10/2019    Procedure: COLONOSCOPY;  Surgeon: Alex Christian MD;  Location: [facility];  Service: Endoscopy;  Laterality: N/A;    ESOPHAGOGASTRODUODENOSCOPY N/A 10/14/2019    Procedure: EGD (ESOPHAGOGASTRODUODENOSCOPY);  Surgeon: Alex Christian MD;  Location: [facility];   Service: Endoscopy;  Laterality: N/A;    INJECTION OF ANESTHETIC AGENT AROUND MEDIAL BRANCH NERVES INNERVATING CERVICAL FACET JOINT Right 6/7/2018    Procedure: BLOCK, NERVE, FACET JOINT, MEDIAL BRANCH, CERVICAL;  Surgeon: Galo Clancy MD;  Location: [facility];  Service: Pain Management;  Laterality: Right;  C4, 5, 6    OPEN REDUCTION AND INTERNAL FIXATION (ORIF) OF INJURY OF ANKLE Right 11/1/2018    Procedure: ORIF, ANKLE;  Surgeon: Wilfredo Aguero MD;  Location: [facility] OR;  Service: Orthopedics;  Laterality: Right;    PARACENTESIS, EYE, ANTERIOR CHAMBER, WITH AQUEOUS REMOVAL Right 7/27/2022    Procedure: Anterior chamber washout, possible IOL removal;  Surgeon: Daniel Tan MD;  Location: [facility] OR;  Service: Ophthalmology;  Laterality: Right;    pyloristenosis      RADIOFREQUENCY ABLATION OF LUMBAR MEDIAL BRANCH NERVE AT SINGLE LEVEL Bilateral 9/21/2018    Procedure: RADIOFREQUENCY ABLATION, NERVE, SPINAL, LUMBAR, MEDIAL BRANCH, 1 LEVEL;  Surgeon: Galo Clancy MD;  Location: [facility];  Service: Pain Management;  Laterality: Bilateral;  L3, 4, 5    RADIOFREQUENCY ABLATION OF LUMBAR MEDIAL BRANCH NERVE AT SINGLE LEVEL Bilateral 2/19/2019    Procedure: Radiofrequency Ablation, Nerve, Spinal, Lumbar, Medial Branch, 1 Level;  Surgeon: Galo Clancy MD;  Location: [facility] OR;  Service: Pain Management;  Laterality: Bilateral;  L3, 4, 5     RADIOFREQUENCY ABLATION OF LUMBAR MEDIAL BRANCH NERVE AT SINGLE LEVEL Bilateral 3/10/2020    Procedure: Radiofrequency Ablation, Nerve, Spinal, Lumbar, Medial Branch, 1 Level;  Surgeon: Galo Clancy MD;  Location: [facility] OR;  Service: Pain Management;  Laterality: Bilateral;  L3,4,5 - Burned at 80 degrees C. for 60 seconds x 2 each site      RADIOFREQUENCY ABLATION OF LUMBAR MEDIAL BRANCH NERVE AT SINGLE LEVEL Bilateral 9/11/2020    Procedure: Radiofrequency Ablation, Nerve, Spinal, Lumbar, Medial Branch, 1 Level;  Surgeon: Galo Clancy MD;  Location: [facility];  Service: Pain Management;   Laterality: Bilateral;  L3, 4, 5 - Burned at 80 degrees C. for 60 seconds x 2 each site    RADIOFREQUENCY ABLATION OF LUMBAR MEDIAL BRANCH NERVE AT SINGLE LEVEL Bilateral 5/28/2021    Procedure: Radiofrequency Ablation, Nerve, Spinal, Lumbar, Medial Branch, 1 Level;  Surgeon: Galo Clancy MD;  Location: [facility] OR;  Service: Pain Management;  Laterality: Bilateral;  L3,4,5    RADIOFREQUENCY THERMAL COAGULATION OF MEDIAL BRANCH OF POSTERIOR RAMUS OF CERVICAL SPINAL NERVE Right 7/3/2018    Procedure: RADIOFREQUENCY THERMAL COAGULATION, NERVE, SPINAL, CERVICAL, MEDIAL BRANCH OF POSTERIOR RAMUS;  Surgeon: Galo Clancy MD;  Location: [facility] OR;  Service: Pain Management;  Laterality: Right;  C4,5,6 - Burned at 80 degrees C. for 75 seconds each site    RADIOFREQUENCY THERMAL COAGULATION OF MEDIAL BRANCH OF POSTERIOR RAMUS OF CERVICAL SPINAL NERVE Right 7/23/2019    Procedure: RADIOFREQUENCY THERMAL COAGULATION, NERVE, SPINAL, CERVICAL, POSTERIOR RAMUS, MEDIAL BRANCH;  Surgeon: Galo Clancy MD;  Location: [facility] OR;  Service: Pain Management;  Laterality: Right;  C4,5,6    RADIOFREQUENCY THERMAL COAGULATION OF MEDIAL BRANCH OF POSTERIOR RAMUS OF CERVICAL SPINAL NERVE Right 6/23/2020    Procedure: RADIOFREQUENCY THERMAL COAGULATION, NERVE, SPINAL, CERVICAL, POSTERIOR RAMUS, MEDIAL BRANCH;  Surgeon: Galo Clancy MD;  Location: [facility] OR;  Service: Pain Management;  Laterality: Right;  C4, 5, 6    RADIOFREQUENCY THERMOCOAGULATION Bilateral 9/10/2019    Procedure: RADIOFREQUENCY THERMAL COAGULATION LUMBAR;  Surgeon: Galo Clancy MD;  Location: [facility] OR;  Service: Pain Management;  Laterality: Bilateral;  L3,4,5 - Burned at 80 degrees C. for 60 seconds x 2 each site    skin cancer removal       TONSILLECTOMY       Social History     Tobacco Use    Smoking status: Never    Smokeless tobacco: Never   Substance Use Topics    Alcohol use: No    Drug use: No     Family History   Problem Relation Age of Onset    Arthritis Mother     Asthma Mother      Rheum arthritis Mother     Pneumonia Mother     Skin cancer Mother         unsure of type    Heart disease Father     Ulcers Father     Alzheimer's disease Maternal Uncle     Rheum arthritis Maternal Grandmother     Emphysema Maternal Grandfather     Cancer Maternal Grandfather         kidney    Kidney disease Maternal Grandfather     Cancer Paternal Grandmother         lung= smoker    Pneumonia Paternal Grandfather     Depression Son     Breast cancer Neg Hx     Ovarian cancer Neg Hx      Review of patient's allergies indicates:   Allergen Reactions    Gabapentin Hallucinations    Xarelto [rivaroxaban] Rash    Bactrim [sulfamethoxazole-trimethoprim] Other (See Comments)     Upset stomach, dry heaves, confusion    Afrin (pseudoephedrine)     Amoxicillin-pot clavulanate      Other reaction(s): Mental Status Change    Atorvastatin      Other reaction(s): Joint pain    Baclofen     Baclofen (bulk) Nausea And Vomiting    Ciprofloxacin     Decongest tabs      Other reaction(s): increased heart rate    Decongestant [pseudoephedrine hcl]     Erythromycin Other (See Comments)    Flecainide Hives     And SOB. No reaction to Lidocaine     Fluoxetine      Other reaction(s): heart palpitations  Other reaction(s): anxiety    Lisinopril Other (See Comments)     cough    Losartan Other (See Comments)     Hypotension with lightheadedness    Morphine Other (See Comments)     confusion    Tramadol Other (See Comments)     SOB, low BP    Venlafaxine     Venlafaxine analogues      Changes in BP and increases heart rate       Afrin [oxymetazoline] Palpitations    Caffeine Palpitations    Dabigatran etexilate Rash    Tizanidine Anxiety     dizziness       Performance Status:The patient's activity level is functions out of house.      Review of Systems:  a review of eleven systems covering constitutional, Eye, HEENT, Psych, Respiratory, Cardiac, GI, , Musculoskeletal, Endocrine, Dermatologic was negative except for pertinent findings  "as listed ABOVE and below:  Dry mouth  Gaining a little weight  Appetite decreased    Exam:Comprehensive exam done. /63 (BP Location: Right arm, Patient Position: Sitting, BP Method: Medium (Automatic))   Pulse 77   Ht 5' 7" (1.702 m)   Wt 49.5 kg (109 lb 0.3 oz)   SpO2 97%   BMI 17.07 kg/m²   Exam included Vitals as listed, and patient's appearance and affect and alertness and mood, oral exam for yeast and hygiene and pharynx lesions and Mallapatti (M) score, neck with inspection for jvd and masses and thyroid abnormalities and lymph nodes (supraclavicular and infraclavicular nodes and axillary also examined and noted if abn), chest exam included symmetry and effort and fremitus and percussion and auscultation, cardiac exam included rhythm and gallops and murmur and rubs and jvd and edema, abdominal exam for mass and hepatosplenomegaly and tenderness and hernias and bowel sounds, Musculoskeletal exam with muscle tone and posture and mobility/gait and  strength, and skin for rashes and cyanosis and pallor and turgor, extremity for clubbing.  Findings were normal except for pertinent findings listed below:  M2, thin, oropharynx dry  HR regular  Breath sounds clear bilaterally  No edema/clubbing    Radiographs (ct chest and cxr) reviewed: view by direct vision and interpreted    X-Ray Chest AP Portable   11/15/2021 enlarged heart, lungs clear    XR CHEST AP PORTABLE  10/14/2021 mild pulmonary edema     CXR 1/23/20- cardiomegaly, prominence of interstitial markings, peribronchial cuffing, RLL  infiltrates  CTA chest 2014- mosaicism consistent with air trapping, bibasilar infiltrates, small right pleural effusion, cardiomegaly with enlarged RV    TTE 9/8/22-   The left ventricle is normal in size with moderate concentric hypertrophy and normal systolic function.  The estimated ejection fraction is 60%.  Indeterminate left ventricular diastolic function.  Normal right ventricular size with normal right " ventricular systolic function.  Mild left atrial enlargement.  Moderate right atrial enlargement.  Mild aortic regurgitation.  Mild mitral regurgitation.  Moderate tricuspid regurgitation.  Mild pulmonic regurgitation.  Normal central venous pressure (3 mmHg).  The estimated PA systolic pressure is 60 mmHg.  There is pulmonary hypertension.       Labs reviewed     Latest Reference Range & Units 04/09/14 08:15   LAWRENCE Negative <1:160  Negative <1:160   Rheumatoid Factor 0.0 - 15.0 IU/mL <10.0     Lab Results   Component Value Date    WBC 6.84 03/01/2023    HGB 13.1 03/01/2023    HCT 40.3 03/01/2023    MCV 99 (H) 03/01/2023     03/01/2023       CMP  Sodium   Date Value Ref Range Status   03/01/2023 140 136 - 145 mmol/L Final     Potassium   Date Value Ref Range Status   03/01/2023 4.3 3.5 - 5.1 mmol/L Final     Chloride   Date Value Ref Range Status   03/01/2023 107 95 - 110 mmol/L Final     CO2   Date Value Ref Range Status   03/01/2023 24 23 - 29 mmol/L Final     Glucose   Date Value Ref Range Status   03/01/2023 86 70 - 110 mg/dL Final     BUN   Date Value Ref Range Status   03/01/2023 22 8 - 23 mg/dL Final     Creatinine   Date Value Ref Range Status   03/01/2023 0.8 0.5 - 1.4 mg/dL Final   09/24/2012 0.6 0.2 - 1.4 mg/dl Final     Calcium   Date Value Ref Range Status   03/01/2023 9.8 8.7 - 10.5 mg/dL Final   09/24/2012 9.9 8.6 - 10.2 mg/dl Final     Total Protein   Date Value Ref Range Status   04/04/2023 6.5 6.0 - 8.4 g/dL Final     Albumin   Date Value Ref Range Status   04/04/2023 3.7 3.5 - 5.2 g/dL Final     Total Bilirubin   Date Value Ref Range Status   04/04/2023 1.0 0.1 - 1.0 mg/dL Final     Comment:     For infants and newborns, interpretation of results should be based  on gestational age, weight and in agreement with clinical  observations.    Premature Infant recommended reference ranges:  Up to 24 hours.............<8.0 mg/dL  Up to 48 hours............<12.0 mg/dL  3-5  days..................<15.0 mg/dL  6-29 days.................<15.0 mg/dL       Alkaline Phosphatase   Date Value Ref Range Status   04/04/2023 88 55 - 135 U/L Final   09/24/2012 63 23 - 119 UNIT/L Final     AST   Date Value Ref Range Status   04/04/2023 27 10 - 40 U/L Final   09/24/2012 26 10 - 30 UNIT/L Final     ALT   Date Value Ref Range Status   04/04/2023 18 10 - 44 U/L Final     Anion Gap   Date Value Ref Range Status   03/01/2023 9 8 - 16 mmol/L Final   09/24/2012 12 5 - 15 meq/L Final     eGFR   Date Value Ref Range Status   03/01/2023 >60 >60 mL/min/1.73 m^2 Final             PFT results reviewed 2014- spirometry shows severe restriction.  Lung volumes and DLCO were not performed       over night pulse ox  <88% 0 min 8/14/22    Plan:  Clinical impression is apparently straight forward and impression with management as below.   PH suspect group 2, NYHA II- with no progressive symptoms, no O2 reqt- follows w/ cardiology  R/o NILSA    Ayla was seen today for asthma.    Diagnoses and all orders for this visit:    Pulmonary hypertension, without RV dysfunction    Mild intermittent asthma without complication    Snoring    Sleep apnea, unspecified type        Follow up if symptoms worsen or fail to improve.    Discussed with patient above for education the following:      Patient Instructions   Continue follow up with cardiology for pulmonary hypertension and heart rhythm issues  Home sleep study recommended and treatment with CPAP if abnormal.

## 2023-09-19 ENCOUNTER — TELEPHONE (OUTPATIENT)
Dept: PULMONOLOGY | Facility: CLINIC | Age: 78
End: 2023-09-19
Payer: MEDICARE

## 2023-09-20 ENCOUNTER — OFFICE VISIT (OUTPATIENT)
Dept: PSYCHIATRY | Facility: CLINIC | Age: 78
End: 2023-09-20
Payer: MEDICARE

## 2023-09-20 VITALS
WEIGHT: 109.38 LBS | DIASTOLIC BLOOD PRESSURE: 61 MMHG | BODY MASS INDEX: 17.17 KG/M2 | SYSTOLIC BLOOD PRESSURE: 100 MMHG | HEIGHT: 67 IN | HEART RATE: 58 BPM

## 2023-09-20 DIAGNOSIS — F33.1 MAJOR DEPRESSIVE DISORDER, RECURRENT, MODERATE: Primary | ICD-10-CM

## 2023-09-20 DIAGNOSIS — F41.0 PANIC DISORDER: ICD-10-CM

## 2023-09-20 DIAGNOSIS — F40.298 SPECIFIC PHOBIA: ICD-10-CM

## 2023-09-20 DIAGNOSIS — F41.1 GENERALIZED ANXIETY DISORDER: ICD-10-CM

## 2023-09-20 PROCEDURE — 3074F SYST BP LT 130 MM HG: CPT | Mod: CPTII,S$GLB,, | Performed by: PSYCHOLOGIST

## 2023-09-20 PROCEDURE — 99214 OFFICE O/P EST MOD 30 MIN: CPT | Mod: S$GLB,,, | Performed by: PSYCHOLOGIST

## 2023-09-20 PROCEDURE — 3074F PR MOST RECENT SYSTOLIC BLOOD PRESSURE < 130 MM HG: ICD-10-PCS | Mod: CPTII,S$GLB,, | Performed by: PSYCHOLOGIST

## 2023-09-20 PROCEDURE — 1159F MED LIST DOCD IN RCRD: CPT | Mod: CPTII,S$GLB,, | Performed by: PSYCHOLOGIST

## 2023-09-20 PROCEDURE — 99999 PR PBB SHADOW E&M-EST. PATIENT-LVL III: ICD-10-PCS | Mod: PBBFAC,,, | Performed by: PSYCHOLOGIST

## 2023-09-20 PROCEDURE — 1157F PR ADVANCE CARE PLAN OR EQUIV PRESENT IN MEDICAL RECORD: ICD-10-PCS | Mod: CPTII,S$GLB,, | Performed by: PSYCHOLOGIST

## 2023-09-20 PROCEDURE — 1157F ADVNC CARE PLAN IN RCRD: CPT | Mod: CPTII,S$GLB,, | Performed by: PSYCHOLOGIST

## 2023-09-20 PROCEDURE — 1101F PT FALLS ASSESS-DOCD LE1/YR: CPT | Mod: CPTII,S$GLB,, | Performed by: PSYCHOLOGIST

## 2023-09-20 PROCEDURE — 3078F DIAST BP <80 MM HG: CPT | Mod: CPTII,S$GLB,, | Performed by: PSYCHOLOGIST

## 2023-09-20 PROCEDURE — 3288F FALL RISK ASSESSMENT DOCD: CPT | Mod: CPTII,S$GLB,, | Performed by: PSYCHOLOGIST

## 2023-09-20 PROCEDURE — 3288F PR FALLS RISK ASSESSMENT DOCUMENTED: ICD-10-PCS | Mod: CPTII,S$GLB,, | Performed by: PSYCHOLOGIST

## 2023-09-20 PROCEDURE — 1159F PR MEDICATION LIST DOCUMENTED IN MEDICAL RECORD: ICD-10-PCS | Mod: CPTII,S$GLB,, | Performed by: PSYCHOLOGIST

## 2023-09-20 PROCEDURE — 99999 PR PBB SHADOW E&M-EST. PATIENT-LVL III: CPT | Mod: PBBFAC,,, | Performed by: PSYCHOLOGIST

## 2023-09-20 PROCEDURE — 1126F AMNT PAIN NOTED NONE PRSNT: CPT | Mod: CPTII,S$GLB,, | Performed by: PSYCHOLOGIST

## 2023-09-20 PROCEDURE — 90833 PSYTX W PT W E/M 30 MIN: CPT | Mod: S$GLB,,, | Performed by: PSYCHOLOGIST

## 2023-09-20 PROCEDURE — 90833 PR PSYCHOTHERAPY W/PATIENT W/E&M, 30 MIN (ADD ON): ICD-10-PCS | Mod: S$GLB,,, | Performed by: PSYCHOLOGIST

## 2023-09-20 PROCEDURE — 1126F PR PAIN SEVERITY QUANTIFIED, NO PAIN PRESENT: ICD-10-PCS | Mod: CPTII,S$GLB,, | Performed by: PSYCHOLOGIST

## 2023-09-20 PROCEDURE — 3078F PR MOST RECENT DIASTOLIC BLOOD PRESSURE < 80 MM HG: ICD-10-PCS | Mod: CPTII,S$GLB,, | Performed by: PSYCHOLOGIST

## 2023-09-20 PROCEDURE — 1101F PR PT FALLS ASSESS DOC 0-1 FALLS W/OUT INJ PAST YR: ICD-10-PCS | Mod: CPTII,S$GLB,, | Performed by: PSYCHOLOGIST

## 2023-09-20 PROCEDURE — 99214 PR OFFICE/OUTPT VISIT, EST, LEVL IV, 30-39 MIN: ICD-10-PCS | Mod: S$GLB,,, | Performed by: PSYCHOLOGIST

## 2023-09-20 RX ORDER — LORAZEPAM 0.5 MG/1
0.5 TABLET ORAL 2 TIMES DAILY PRN
Qty: 60 TABLET | Refills: 0 | Status: SHIPPED | OUTPATIENT
Start: 2023-09-20 | End: 2023-11-22 | Stop reason: SDUPTHER

## 2023-09-20 RX ORDER — BUSPIRONE HYDROCHLORIDE 10 MG/1
10 TABLET ORAL 3 TIMES DAILY
Qty: 90 TABLET | Refills: 2 | Status: SHIPPED | OUTPATIENT
Start: 2023-09-20 | End: 2024-01-17 | Stop reason: SDUPTHER

## 2023-09-20 NOTE — PROGRESS NOTES
Outpatient Psychiatry Follow-Up Visit    Clinical Status of Patient: Outpatient (Ambulatory)  09/20/2023     Chief Complaint: 78 y/o female presenting today with  for a follow-up.       Interval History and Content of Current Session:  Interim Events/Subjective Report/Content of Current Session: 78 y/o female follow-up appointment.    Pt is a 78 y/o female with past psychiatric hx of depression, anxiety, eating disorder who presents for follow-up treatment. Pt reported having some fluctuations in heart rate, resulting in worries and increased anxiety. Pt reported that she had some increase in anxiety with reduction in buspirone and returned to 10 mg TID. Pt noted mood is stable. Pt's  expressed some concern about hormone levels as pt was on HRT in the past. Will f/u with OB/GYN.     Past Psychiatric hx: remeron, rexulti, vraylar, Limbitrol, venlafaxine, fluoxetine, gabapentin, cannot remember others.     Past Medical hx:   Past Medical History:   Diagnosis Date    Anticoagulant long-term use     Anxiety     Arthritis     Atrial fibrillation     Cancer     skin    CHF (congestive heart failure)     Depression     DVT (deep venous thrombosis)     GERD (gastroesophageal reflux disease)     Glaucoma (increased eye pressure)     Hyperlipidemia     diet controlled    Hypertension     Interstitial lung disease     Localized hives 01/10/2020    Mild persistent asthma without complication 11/12/2018    Pacemaker     Pneumonia 01/31/2014    Stroke 06/03/2014    Stroke     TIA (transient ischemic attack)     TIA (transient ischemic attack)         Interim hx:  Medication changes last visit: Reduce buspirone to 5 mg TID  Anxiety: decrease  Depression: decrease     Denies suicidal/homicidal ideations.  Denies hopelessness/worthlessness.    Denies auditory/visual hallucinations      Alcohol: Infrequent use  Drug: Pt denied  Caffeine: Not assessed  Tobacco: Pt denied      Review of Systems   PSYCHIATRIC: Pertinent  items are noted in the narrative.        CONSTITUTIONAL: weight stable    Past Medical, Family and Social History: The patient's past medical, family and social history have been reviewed and updated as appropriate within the electronic medical record. See encounter notes.     Current Psychiatric Medication:  ativan 0.5 mg qd PRN, buspirone 5 mg TID, trintillex 20 mg     Compliance: yes      Side effects: Pt denied     Risk Parameters:  Patient reports no suicidal ideation  Patient reports no homicidal ideation  Patient reports no self-injurious behavior  Patient reports no violent behavior     Exam (detailed: at least 9 elements; comprehensive: all 15 elements)   Constitutional  Vitals:  Most recent vital signs, dated less than 90 days prior to this appointment, were reviewed.        General:  unremarkable, age appropriate, casual attire, good eye contact, good rapport       Musculoskeletal  Muscle Strength/Tone:  no flaccidity, no tremor    Gait & Station:  normal      Psychiatric                       Speech:  normal tone, normal rate, rhythm, and volume   Mood & Affect:   Mildly Depressed, anxious         Thought Process:   Goal directed; Linear    Associations:   intact   Thought Content:   No SI/HI, delusions, or paranoia, no AV/VH   Insight & Judgement:   Good, adequate to circumstances   Orientation:   grossly intact; alert and oriented x 4    Memory:  intact for content of interview    Language:  grossly intact, can repeat    Attention Span  : Grossly intact for content of interview   Fund of Knowledge:   intact and appropriate to age and level of education        Assessment and Diagnosis   Status/Progress: Based on the examination today, the patient's problem(s) is/are adequately controlled.  New problems have not been presented today. Co-morbidities are not complicating management of the primary condition. There are no active rule-out diagnoses for this patient at this time.      Impression: Pt continues  to improve overall and responding well to reducing polypharmacy and medication dosing. Gait has improved. Increased verbal responses in session. Will increase buspirone back to 10 mg TID due to slight increase in anxiety and continue with other medications as prescribed.     Diagnosis:   1) Major Depressive Disorder, recurrent, moderate  2) Generalized Anxiety Disorder  3) Specific Phobia  4) Panic Disorder   5) Personality Disorder traits  6) R/o Dementia of unknown etiology  Intervention/Counseling/Treatment Plan   Medication Management:      1. ativan 0.5 mg qd PRN    2. Return buspirone to 10 mg TID    3. trintillex to 20 mg    4. Call to report any worsening of symptoms or problems with the medication. Pt instructed to go to ER with thoughts of harming self, others     5. Cont in therapy with Greame Meyer LCSW    Psychotherapy: 20 minutes   Target symptoms: anxiety  Why chosen therapy is appropriate versus another modality: CBT used; relevant to diagnosis, patient responds to this modality  Outcome monitoring methods: self-report, observation  Therapeutic intervention type: Cognitive Behavioral Therapy, Solution-focused  Topics discussed/themes: building skills sets for symptom management, symptom recognition, nutrition, exercise  The patient's response to the intervention is accepting  Patient's response to treatment is: good.   The patient's progress toward treatment goals: limited to fair     Return to clinic: 1 month    -Cognitive-Behavioral/Supportive therapy and psychoeducation provided  -R/B/SE's of medications discussed with the pt who expresses understanding and chooses to take medications as prescribed.   -Pt instructed to call clinic, 911 or go to nearest emergency room if sxs worsen or pt is in   crisis. The pt expresses understanding.    Galo Lipscomb, PhD, MP     Antidepressant/Antianxiety Medication Initiation:  Patient informed of risks, benefits, and potential side effects of medication and  accepts informed consent.  Common side effects include nausea, fatigue, headache, insomnia., Specifically discussed the possibility of new or worsening suicidal thoughts/depression.  Patient instructed to stop the medication immediately and seek urgent treatment if this occurs. Patient instructed not to abruptly discontinue medication without physician guidance except in cases of sudden onset or worsening of SI.       Stimulant Medication Initiation:  Patient advised of risks, benefits, and side effects of medication and accepts informed consent.  Common side effects include insomnia, irritability, jittery feeling, dry mouth, and agitation/hostility., Patient advised of potential addictive nature of medication and controlled substance classification.  Instructed to safeguard medication as no early refills can be given for lost or stolen medications.       Benzodiazepine Initiation:  Patient advised of the risks, benefits, and common side effects of medication and has accepted informed consent.  Common side effects include drowsiness, impaired coordination, possible memory loss., Patient advised NOT to operate a vehicle or machinery untiil they are sure how the medication will affec them.  Client also advised of danger of mixing this medication with alcohol., Patient advised of potential addictive nature of medication and need to safeguard medication as no early refills for lost or stolen medications can be authorized.

## 2023-09-27 ENCOUNTER — PATIENT MESSAGE (OUTPATIENT)
Dept: OBSTETRICS AND GYNECOLOGY | Facility: CLINIC | Age: 78
End: 2023-09-27
Payer: MEDICARE

## 2023-09-27 ENCOUNTER — PATIENT MESSAGE (OUTPATIENT)
Dept: CARDIOLOGY | Facility: CLINIC | Age: 78
End: 2023-09-27
Payer: MEDICARE

## 2023-09-27 NOTE — TELEPHONE ENCOUNTER
Called patient in response to message received. Patient did not answer. I left a message with my call back number. I also sent a message via the patient portal.

## 2023-10-04 ENCOUNTER — CLINICAL SUPPORT (OUTPATIENT)
Dept: CARDIOLOGY | Facility: HOSPITAL | Age: 78
End: 2023-10-04
Payer: MEDICARE

## 2023-10-04 DIAGNOSIS — Z95.0 PRESENCE OF CARDIAC PACEMAKER: ICD-10-CM

## 2023-10-04 PROCEDURE — 93296 REM INTERROG EVL PM/IDS: CPT | Performed by: INTERNAL MEDICINE

## 2023-10-05 ENCOUNTER — PROCEDURE VISIT (OUTPATIENT)
Dept: DERMATOLOGY | Facility: CLINIC | Age: 78
End: 2023-10-05
Payer: MEDICARE

## 2023-10-05 ENCOUNTER — PATIENT MESSAGE (OUTPATIENT)
Dept: PULMONOLOGY | Facility: CLINIC | Age: 78
End: 2023-10-05
Payer: MEDICARE

## 2023-10-05 ENCOUNTER — LAB VISIT (OUTPATIENT)
Dept: LAB | Facility: HOSPITAL | Age: 78
End: 2023-10-05
Attending: FAMILY MEDICINE
Payer: MEDICARE

## 2023-10-05 VITALS — WEIGHT: 109 LBS | HEIGHT: 67 IN | BODY MASS INDEX: 17.11 KG/M2

## 2023-10-05 DIAGNOSIS — R74.01 TRANSAMINASEMIA: ICD-10-CM

## 2023-10-05 DIAGNOSIS — G47.33 OSA (OBSTRUCTIVE SLEEP APNEA): Primary | ICD-10-CM

## 2023-10-05 DIAGNOSIS — C44.619 BASAL CELL CARCINOMA (BCC) OF LEFT SHOULDER: Primary | ICD-10-CM

## 2023-10-05 DIAGNOSIS — R73.03 PREDIABETES: ICD-10-CM

## 2023-10-05 DIAGNOSIS — E78.2 MIXED HYPERLIPIDEMIA: ICD-10-CM

## 2023-10-05 LAB
ALBUMIN SERPL BCP-MCNC: 3.9 G/DL (ref 3.5–5.2)
ALP SERPL-CCNC: 67 U/L (ref 55–135)
ALT SERPL W/O P-5'-P-CCNC: 14 U/L (ref 10–44)
ANION GAP SERPL CALC-SCNC: 9 MMOL/L (ref 8–16)
AST SERPL-CCNC: 27 U/L (ref 10–40)
BILIRUB SERPL-MCNC: 1.1 MG/DL (ref 0.1–1)
BUN SERPL-MCNC: 17 MG/DL (ref 8–23)
CALCIUM SERPL-MCNC: 9.9 MG/DL (ref 8.7–10.5)
CHLORIDE SERPL-SCNC: 106 MMOL/L (ref 95–110)
CHOLEST SERPL-MCNC: 242 MG/DL (ref 120–199)
CHOLEST/HDLC SERPL: 3 {RATIO} (ref 2–5)
CO2 SERPL-SCNC: 24 MMOL/L (ref 23–29)
CREAT SERPL-MCNC: 0.8 MG/DL (ref 0.5–1.4)
EST. GFR  (NO RACE VARIABLE): >60 ML/MIN/1.73 M^2
ESTIMATED AVG GLUCOSE: 114 MG/DL (ref 68–131)
GLUCOSE SERPL-MCNC: 93 MG/DL (ref 70–110)
HBA1C MFR BLD: 5.6 % (ref 4–5.6)
HDLC SERPL-MCNC: 81 MG/DL (ref 40–75)
HDLC SERPL: 33.5 % (ref 20–50)
LDLC SERPL CALC-MCNC: 142.4 MG/DL (ref 63–159)
NONHDLC SERPL-MCNC: 161 MG/DL
POTASSIUM SERPL-SCNC: 4.1 MMOL/L (ref 3.5–5.1)
PROT SERPL-MCNC: 7.3 G/DL (ref 6–8.4)
SODIUM SERPL-SCNC: 139 MMOL/L (ref 136–145)
TRIGL SERPL-MCNC: 93 MG/DL (ref 30–150)

## 2023-10-05 PROCEDURE — 88305 TISSUE EXAM BY PATHOLOGIST: CPT | Performed by: PATHOLOGY

## 2023-10-05 PROCEDURE — 11603 EXC TR-EXT MAL+MARG 2.1-3 CM: CPT | Mod: S$GLB,,, | Performed by: DERMATOLOGY

## 2023-10-05 PROCEDURE — 83036 HEMOGLOBIN GLYCOSYLATED A1C: CPT | Performed by: FAMILY MEDICINE

## 2023-10-05 PROCEDURE — 12032 PR LAYR CLOS WND TRUNK,ARM,LEG 2.6-7.5 CM: ICD-10-PCS | Mod: 51,S$GLB,, | Performed by: DERMATOLOGY

## 2023-10-05 PROCEDURE — 36415 COLL VENOUS BLD VENIPUNCTURE: CPT | Mod: PO | Performed by: FAMILY MEDICINE

## 2023-10-05 PROCEDURE — 12032 INTMD RPR S/A/T/EXT 2.6-7.5: CPT | Mod: 51,S$GLB,, | Performed by: DERMATOLOGY

## 2023-10-05 PROCEDURE — 80053 COMPREHEN METABOLIC PANEL: CPT | Performed by: FAMILY MEDICINE

## 2023-10-05 PROCEDURE — 80061 LIPID PANEL: CPT | Performed by: FAMILY MEDICINE

## 2023-10-05 PROCEDURE — 99499 UNLISTED E&M SERVICE: CPT | Mod: S$GLB,,, | Performed by: DERMATOLOGY

## 2023-10-05 PROCEDURE — 11603 PR EXC SKIN MALIG 2.1-3 CM TRUNK,ARM,LEG: ICD-10-PCS | Mod: S$GLB,,, | Performed by: DERMATOLOGY

## 2023-10-05 PROCEDURE — 88305 TISSUE EXAM BY PATHOLOGIST: CPT | Mod: 26,,, | Performed by: PATHOLOGY

## 2023-10-05 PROCEDURE — 88305 TISSUE EXAM BY PATHOLOGIST: ICD-10-PCS | Mod: 26,,, | Performed by: PATHOLOGY

## 2023-10-05 PROCEDURE — 99499 NO LOS: ICD-10-PCS | Mod: S$GLB,,, | Performed by: DERMATOLOGY

## 2023-10-05 NOTE — PROGRESS NOTES
Subjective:      Patient ID:  Ayla Dela Cruz is a 78 y.o. female who presents for   Chief Complaint   Patient presents with    Basal Cell Carcinoma     Left shoulder     Pt here for definitive excision and suture or left shoulder.  Feeling well today.   Denies pacemaker, defibrillator.  No blood thinners.   No additional cutaneous complaints.       1. Skin, left superior shoulder, shave biopsy:   - Basal cell carcinoma with mixed superficial, nodular and infiltrative growth pattern.   - The tumor focally extends to the deep biopsy margin.           Review of Systems   Constitutional:  Negative for fever, chills and fatigue.   Skin:  Positive for dry skin. Negative for daily sunscreen use and activity-related sunscreen use.   Hematologic/Lymphatic: Bruises/bleeds easily.       Objective:   Physical Exam   Constitutional: She appears well-developed and well-nourished.   Neurological: She is alert and oriented to person, place, and time.   Psychiatric: She has a normal mood and affect.   Skin:               Diagram Legend     Erythematous scaling macule/papule c/w actinic keratosis       Vascular papule c/w angioma      Pigmented verrucoid papule/plaque c/w seborrheic keratosis      Yellow umbilicated papule c/w sebaceous hyperplasia      Irregularly shaped tan macule c/w lentigo     1-2 mm smooth white papules consistent with Milia      Movable subcutaneous cyst with punctum c/w epidermal inclusion cyst      Subcutaneous movable cyst c/w pilar cyst      Firm pink to brown papule c/w dermatofibroma      Pedunculated fleshy papule(s) c/w skin tag(s)      Evenly pigmented macule c/w junctional nevus     Mildly variegated pigmented, slightly irregular-bordered macule c/w mildly atypical nevus      Flesh colored to evenly pigmented papule c/w intradermal nevus       Pink pearly papule/plaque c/w basal cell carcinoma      Erythematous hyperkeratotic cursted plaque c/w SCC      Surgical scar with no sign of skin  cancer recurrence      Open and closed comedones      Inflammatory papules and pustules      Verrucoid papule consistent consistent with wart     Erythematous eczematous patches and plaques     Dystrophic onycholytic nail with subungual debris c/w onychomycosis     Umbilicated papule    Erythematous-base heme-crusted tan verrucoid plaque consistent with inflamed seborrheic keratosis     Erythematous Silvery Scaling Plaque c/w Psoriasis     See annotation          Assessment / Plan:      Pathology Orders:       Normal Orders This Visit    Specimen to Pathology, Dermatology     Comments:    Number of Specimens:->1  ------------------------->-------------------------  Spec 1 Procedure:->Excision >2cm  Spec 1 Clinical Impression:->bx proven bcc check margins  Spec 1 Source:->left sup shoulder    Questions:    Procedure Type: Dermatology and skin neoplasms    Number of Specimens: 1    ------------------------: -------------------------    Spec 1 Procedure: Excision >2cm    Spec 1 Clinical Impression: bx proven bcc check margins    Spec 1 Source: left sup shoulder    Release to patient:           Basal cell carcinoma (BCC) of left shoulder  -     Specimen to Pathology, Dermatology    PROCEDURE: Elliptical excision with intermediate layered repair in order to decrease dead space, decrease tension, and close large gap.    ANESTHETIC: 8 cc 1% Xylocaine with Epinephrine 1:100,000, buffered    SURGEON: Allie Jarrell MD  ASSISTANTS: Kimberly Ramos RN,  Adri Anderson MA, Rubio Skinner MA    PREOPERATIVE DIAGNOSIS:  Biopsy-proven Basal Cell Carcinoma    POSTOPERATIVE DIAGNOSIS:  Same as preoperative diagnosis    PATHOLOGIC DIAGNOSIS: Pending    LOCATION: left sup shoulder    INITIAL LESION SIZE: 1x1.5 cm    EXCISED DIAMETER: 1.8x2.3 cm    PREPARATION: The diagnosis, procedure, alternatives, benefits and risks, including but not limited to: infection, bleeding/bruising, drug reactions, pain, scar or cosmetic defect,  local sensation disturbances, wound dehiscence (separation of wound edges after sutures removed) and/or recurrence of present condition were explained to the patient. The patient elected to proceed.  Patient's identity was verified using 2 patient identifiers and the side and site was verified.  Time out period with surgeon, assistant and patient in surgical suite was taken.    PROCEDURE: The location noted above was prepped and draped in the usual sterile fashion. The area was anesthetized with intradermal buffered xylocaine. Lesional tissue was carefully marked with at least 4 mm margins of clinically normal skin in all directions. A fusiform elliptical excision was done with #15 blade carried down completely through the dermis into the subcutaneous tissues to the level of the subcutaneous fat, and dissection was carried out in that plane.  Electrocoagulation was used to obtain hemostasis. Blood loss was minimal. The wound was then approximated in a layered fashion with subcutaneous and intradermal sutures of 4.0 Monocryl, approximately 5 in number, and the wound was then superficially closed with simple interrupted sutures of 4.0 Prolene.    The patient tolerated the procedure well.    The area was cleaned and dressed appropriately and the patient was given wound care instructions, as well as an appointment for follow-up evaluation.    LENGTH OF REPAIR: 5 cm            Follow up in about 2 weeks (around 10/19/2023) for suture removal.

## 2023-10-05 NOTE — PROGRESS NOTES
Ms. Dela Cruz is a patient of Dr. Calderon and was last seen in clinic 3/8/2023.      Subjective:   Patient ID:  Ayla Dela Cruz is an 78 y.o. female who presents for follow up of Palpitations  .   The patient location is: [place]  The chief complaint leading to consultation is: Palpitations  Visit type: audiovisual  Face to Face time with patient: 20  30 minutes of total time spent on the encounter, which includes face to face time and non-face to face time preparing to see the patient (eg, review of tests), Obtaining and/or reviewing separately obtained history, Documenting clinical information in the electronic or other health record, Independently interpreting results (not separately reported) and communicating results to the patient/family/caregiver, or Care coordination (not separately reported).   Each patient to whom he or she provides medical services by telemedicine is:  (1) informed of the relationship between the physician and patient and the respective role of any other health care provider with respect to management of the patient; and (2) notified that he or she may decline to receive medical services by telemedicine and may withdraw from such care at any time.    Notes:     HPI:    Ms. Dela Cruz is an 78 y.o. female with HTN, HLD, TIA, AFL (RFA 1990), AF (PVI 1997, PVI 2017), renal infarction, PPM with a telemedicine visit for follow up.     Background:    Cardiologist: Barrett Jurado MD    Ayla Dela Cruz has a hx of HTN, HLD, and TIA in 2014, whose history of arrhythmias dates back to her 30s when she began to experience sudden onset palpitations. She was started on Tambacor at that time, which improved her symptoms. She was started on Coumadin at age 55 years. In the mid-1990s, she underwent an ablation for what sounds like atrial flutter in [place], [place]. In 1997 she underwent a second ablation which she was told was unsuccessful. Since that time she has undergone two electrical  cardioversions, once in the mid-2000s (which lasted 5 years), and another around 2010. Around 2014 her arrhythmias recurred around the time she had her TIA, for which she was placed on Xarelto.    ECGs in the EMR at her initial office visit. ECGs from 3445-3641 showed sinus rhythm. ECGs from 1/2014 and 4/2014 showed AF. ECGs between 6/2014 and 1/2016 showed sinus bradycardia and NSR. All ECGs between 1/16/2016 and 5/2017 showed either AF or AFL. When in AFL, RVR was generally seen.    In 9/2017, a PVI was performed. All four PVs were reconnected from a prior PVI, and successfully reisolated. A septal MA flutter line was also created due to frequent inductions during the case. The existing CTI-line was found to be intact. She was discharged on Amiodarone 100 mg daily. At her 11/2017 follow-up, Ms. Dela Cruz was feeling well apart from occasional AVILA relieved with lasix. Her energy level had vastly improved following ablation. Amiodarone was stopped at that visit (I was concerned it was causing her intermittent AVILA). Stopping Amiodarone improved her symptoms. A 48 hour Holter monitor performed in 11/2017 showed sinus rhythm with an average HR of 58 bpm.     At her 2/2018 visit Ms. Dela Cruz continued to feel well, with no complaints. However, beginning in early 8/2018, Ms. Dela Cruz began to note significant AVILA, lightheadedness, and dizziness, which temporally correlated with her starting Tramadol. She had been off this medication for a few days. She also cut losartan from 100 mg to 50 mg once daily on her own. At her 9/2018 visit I reviewed recent HR and BP trends, with SBPs frequently in the 80-90s mmHg range. At that visit I stopped losartan. A 48 hour Holter monitor showed sinus rhythm with an average HR of 63 bpm, with a 6.6% PAC burden.     At her visit in 11/2018, Ms. Dela Cruz had an episode of near syncope in [place], which resulted in a right ankle break for which she required surgery. Otherwise she was  feeling much better, with improved breathing and lightheadedness.    At her 1/2020 visit, she described multiple issues with NOACs. She specifically described pruritis on Xarelto, and pruritis on Eliquis. At the time she was on Pradaxa and tolerating it. Ms. 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    At her 11/2020 visit Ms. Dela Cruz was tolerating pradaxa, and denied GI bleeding events. She had irregular heartbeats on occasion but not palpitations. At her request, pradaxa was switched back to eliquis.     ECGs from earlier in 2021 show profound sinus bradycardia with occasional extrasystoles and compensatory pauses (beta blocker has since been down-titrated). In 9/2021 Ms. Dela Cruz presented to [facility] with palpitations and skipped beats. The initial ECG was consistent with type 1 second-degree AVB with occasional extraystoles, the second consistent with a paroxysms of atypical flutter. ECGs associated with a negative NST also shows periods consistent with atypical flutter.    At her last visit in 9/2021 we discussed options including re-do PVI vs [employer] dual chamber pacemaker implantation and sotalol initiation. She chose the latter, which was performed in 10/2021. She was not started on an antiarrhythmic post-procedure.     At her 3/2022 visit, a device check showed 10 AMS episodes (longest 19 minutes), AF burden <1%. Plan at that time was to start sotalol if AF burden began to increase.  Device interrogation which shows stable device/lead function, RA pacing 82%, RV pacing 8.5%, 18 AMS episodes (longest 2 minutes), AF burden <1%, battery 8-9 years.  ECG is atrial pacing at 60 bpm with a QTc of 464 ms.  Her [id number] Score is 5 (age, female, TIA, HTN) so she should remain on anticoagulation indefinitely. She is now 4 years post-PVI and MA flutter line, and was maintaining sinus rhythm off Amiodarone until 9/2021. She is now  status-post dual chamber pacemaker implantation. AMS burden <1%  Plan is to hold the course and see me again in 1 year. Should she have increased AF burden the plan will be to start sotalol 40 mg bid.  Ms. Dela Cruz had a likely cardioembolic event after stopping AC for a week. 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      Update (10/09/2023):    However, she can feel her heart beat and notices that when she gets up the beats will increase 15 to 25 beats per minute. This makes her more anxious as it sometimes makes her short of breath. I think it would settle her mind if we met with you when you are in [place].    Today she is on video call with her . She reports ~2 weeks ago was experiencing heart racing and irreg HRs identified with pulse oximeter. Would wake in the low 100s, then 60s, then 80s. Coupled with some anxiety and SOB. Symptoms relieved after ativan. These symptoms have improved in recent days. No CP, LH, syncope reported.    She is currently taking eliquis 5mg BID for stroke prophylaxis and denies significant bleeding episodes. She is currently being treated with lopressor 25mg BID for HR control. Kidney function is stable, with a creatinine of 0.8 on 10/5/2023.  Checked with device team today and no interim reports have been received and remote monitor is in operation.   T[facility]    Last device report (remote - 8/2023) showed very low AF burden (<1%) with longest episode 37 min in July. AP 49%.  <1%. 7-9 yrs to JULIO.    Last ECG in Epic 6/16/23 shows APVS at 60bpm. GA interval is 292. QRS is 106. QT is 478.    Relevant Cardiac Test Results:    2D Echo (9/8/2022):  The left ventricle is normal in size with moderate concentric hypertrophy and normal systolic  function.  The estimated ejection fraction is 60%.  Indeterminate left ventricular diastolic function.  Normal right ventricular size with normal right ventricular systolic function.  Mild left atrial enlargement.  Moderate right atrial enlargement.  Mild aortic regurgitation.  Mild mitral regurgitation.  Moderate tricuspid regurgitation.  Mild pulmonic regurgitation.  Normal central venous pressure (3 mmHg).  The estimated PA systolic pressure is 60 mmHg.  There is pulmonary hypertension.    Current Outpatient Medications   Medication Sig    apixaban (ELIQUIS) 5 mg Tab Take 1 tablet (5 mg total) by mouth 2 (two) times daily.    aspirin 81 MG Chew TAKE 1 TABLET BY MOUTH EVERY DAY    atropine 1% (ISOPTO ATROPINE) 1 % Drop Place 1 drop into the right eye 2 (two) times a day.    busPIRone (BUSPAR) 10 MG tablet Take 1 tablet (10 mg total) by mouth 3 (three) times daily.    dorzolamide-timolol 2-0.5% (COSOPT) 22.3-6.8 mg/mL ophthalmic solution Place 1 drop into both eyes 3 (three) times daily.    LORazepam (ATIVAN) 0.5 MG tablet Take 1 tablet (0.5 mg total) by mouth 2 (two) times daily as needed for Anxiety.    metoprolol tartrate (LOPRESSOR) 25 MG tablet TAKE 1 TABLET BY MOUTH TWICE A DAY    pantoprazole (PROTONIX) 40 MG tablet Take 1 tablet (40 mg total) by mouth once daily.    vortioxetine (TRINTELLIX) 20 mg Tab Take 1 tablet (20 mg total) by mouth Daily.     No current facility-administered medications for this visit.     Facility-Administered Medications Ordered in Other Visits   Medication    bupivacaine (PF) 0.25% (2.5 mg/ml) injection    lidocaine (PF) 10 mg/ml (1%) injection    lidocaine (PF) 20 mg/ml (2%) injection    methylPREDNISolone acetate injection       Review of Systems   Constitutional: Negative for malaise/fatigue.   Cardiovascular:  Positive for irregular heartbeat and palpitations. Negative for chest pain, dyspnea on exertion and leg swelling.   Respiratory:  Positive for shortness of breath.   "  Hematologic/Lymphatic: Negative for bleeding problem.   Skin:  Negative for rash.   Musculoskeletal:  Negative for myalgias.   Gastrointestinal:  Negative for hematemesis, hematochezia and nausea.   Genitourinary:  Negative for hematuria.   Neurological:  Negative for light-headedness.   Psychiatric/Behavioral:  Negative for altered mental status.    Allergic/Immunologic: Negative for persistent infections.       Objective:        /77   Pulse 60   Ht 5' 7" (1.702 m)   Wt 49.4 kg (108 lb 14.5 oz)   BMI 17.06 kg/m²   - N/A telemedicine visit    Physical Exam  - N/A telemedicine visit    Lab Results   Component Value Date     10/05/2023    K 4.1 10/05/2023    MG 1.7 09/13/2021    BUN 17 10/05/2023    CREATININE 0.8 10/05/2023    CREATININE 0.6 09/24/2012    ALT 14 10/05/2023    AST 27 10/05/2023    AST 26 09/24/2012    HGB 13.1 03/01/2023    HGB 14.4 09/24/2012    HCT 40.3 03/01/2023    TSH 1.194 09/20/2022    LDLCALC 142.4 10/05/2023       Recent Labs   Lab 10/14/21  1309 10/19/21  1007 10/25/21  1100 07/23/22  1019   INR 1.7 H 1.7 H 1.3 H 1.3 H       Assessment:     1. Persistent atrial fibrillation    2. Chronic diastolic heart failure, 2014    3. Primary hypertension    4. TIA (transient ischemic attack), 11/3/2014    5. Cardiac pacemaker in situ, [employer], dual chamber, 10/14/2021    6. H/O: CVA (cerebrovascular accident), June 2014    7. History of DVT (deep vein thrombosis)    8. Long term (current) use of anticoagulants    9. S/P ablation of atrial flutter        Plan:     In summary, Ms. Dela Cruz is an 78 y.o. female with HTN, HLD, TIA, AFL (RFA 1990), AF (PVI 1997, PVI 2017), renal infarction, PPM with a telemedicine visit for follow up.   Pt experiencing irreg HB, palps, SOB ~ 2 weeks ago which have improved. No recent alerts from device and remote monitor is functional. Last report shows low (<1%) AF burden. 49% AP, <1% .  Discussed her symptoms could be brief AF vs PMT vs RR vs " ectopy. If they recur would recommend 48hr Holter to evaluate symptom-rhythm correlation. She has a sleep study scheduled for tonight.     Sleep study tonight.  Contact clinic if symptoms worsen and will order Holter to establish symptom-rhythm correlation  Continue current meds  Continue routine device checks  RTC as previously scheduled in March 2024 in [place].      *A copy of this note has been sent to Dr. Calderon*    Follow up March 2024 in [place].    ------------------------------------------------------------------    TREVON Antunez, NP-C  Cardiac Electrophysiology

## 2023-10-05 NOTE — PATIENT INSTRUCTIONS
Surgery Wound Care    Your doctor has performed an excision today.  A bandage and vaseline ointment has been placed over the site.  This should remain in place for 24 hours.  It is recommended that you keep the area dry for the first 24 hours.  After 24 hours, you may remove the band aid and wash the area with warm soap and water and apply Vaseline jelly or aquaphore.  Many patients prefer to use Neosporin or Bacitracin ointment.  This is acceptable; however know that you can develop an allergy to this medication even if you have used it safely for years.  It is important to keep the area moist.  Letting it dry out and get air slows healing time, will worsen the scar, and make it more difficult to remove the stitches if they were placed.        If you notice increasing redness, tenderness, pain, or yellow drainage at the biopsy or surgical site, please notify your doctor.  These are signs of an infection.    If your biopsy/surgical site is bleeding, apply firm pressure for 15 minutes straight.  Repeat for another 15 minutes, if it is still bleeding.   If the surgical site continues to bleed, then please contact your doctor.    [facility] - DERMATOLOGY  [address] [place] [address] [address]  [facility] [id number]  Dept: [phone]

## 2023-10-09 ENCOUNTER — OFFICE VISIT (OUTPATIENT)
Dept: ELECTROPHYSIOLOGY | Facility: CLINIC | Age: 78
End: 2023-10-09
Payer: MEDICARE

## 2023-10-09 ENCOUNTER — PATIENT MESSAGE (OUTPATIENT)
Dept: FAMILY MEDICINE | Facility: CLINIC | Age: 78
End: 2023-10-09
Payer: MEDICARE

## 2023-10-09 VITALS
WEIGHT: 108.94 LBS | DIASTOLIC BLOOD PRESSURE: 77 MMHG | HEART RATE: 60 BPM | BODY MASS INDEX: 17.1 KG/M2 | HEIGHT: 67 IN | SYSTOLIC BLOOD PRESSURE: 107 MMHG

## 2023-10-09 DIAGNOSIS — Z86.718 HISTORY OF DVT (DEEP VEIN THROMBOSIS): ICD-10-CM

## 2023-10-09 DIAGNOSIS — I10 PRIMARY HYPERTENSION: ICD-10-CM

## 2023-10-09 DIAGNOSIS — Z86.79 S/P ABLATION OF ATRIAL FLUTTER: ICD-10-CM

## 2023-10-09 DIAGNOSIS — I48.19 PERSISTENT ATRIAL FIBRILLATION: Primary | ICD-10-CM

## 2023-10-09 DIAGNOSIS — I50.32 CHRONIC DIASTOLIC HEART FAILURE: ICD-10-CM

## 2023-10-09 DIAGNOSIS — Z86.73 H/O: CVA (CEREBROVASCULAR ACCIDENT): ICD-10-CM

## 2023-10-09 DIAGNOSIS — Z79.01 LONG TERM (CURRENT) USE OF ANTICOAGULANTS: ICD-10-CM

## 2023-10-09 DIAGNOSIS — G45.9 TIA (TRANSIENT ISCHEMIC ATTACK): ICD-10-CM

## 2023-10-09 DIAGNOSIS — Z98.890 S/P ABLATION OF ATRIAL FLUTTER: ICD-10-CM

## 2023-10-09 DIAGNOSIS — Z95.0 CARDIAC PACEMAKER IN SITU: ICD-10-CM

## 2023-10-09 LAB
FINAL PATHOLOGIC DIAGNOSIS: NORMAL
GROSS: NORMAL
Lab: NORMAL
MICROSCOPIC EXAM: NORMAL

## 2023-10-09 PROCEDURE — 3288F PR FALLS RISK ASSESSMENT DOCUMENTED: ICD-10-PCS | Mod: CPTII,95,, | Performed by: NURSE PRACTITIONER

## 2023-10-09 PROCEDURE — 3074F SYST BP LT 130 MM HG: CPT | Mod: CPTII,95,, | Performed by: NURSE PRACTITIONER

## 2023-10-09 PROCEDURE — 3078F PR MOST RECENT DIASTOLIC BLOOD PRESSURE < 80 MM HG: ICD-10-PCS | Mod: CPTII,95,, | Performed by: NURSE PRACTITIONER

## 2023-10-09 PROCEDURE — 3078F DIAST BP <80 MM HG: CPT | Mod: CPTII,95,, | Performed by: NURSE PRACTITIONER

## 2023-10-09 PROCEDURE — 1160F RVW MEDS BY RX/DR IN RCRD: CPT | Mod: CPTII,95,, | Performed by: NURSE PRACTITIONER

## 2023-10-09 PROCEDURE — 1157F ADVNC CARE PLAN IN RCRD: CPT | Mod: CPTII,95,, | Performed by: NURSE PRACTITIONER

## 2023-10-09 PROCEDURE — 1101F PT FALLS ASSESS-DOCD LE1/YR: CPT | Mod: CPTII,95,, | Performed by: NURSE PRACTITIONER

## 2023-10-09 PROCEDURE — 1159F MED LIST DOCD IN RCRD: CPT | Mod: CPTII,95,, | Performed by: NURSE PRACTITIONER

## 2023-10-09 PROCEDURE — 1101F PR PT FALLS ASSESS DOC 0-1 FALLS W/OUT INJ PAST YR: ICD-10-PCS | Mod: CPTII,95,, | Performed by: NURSE PRACTITIONER

## 2023-10-09 PROCEDURE — 1160F PR REVIEW ALL MEDS BY PRESCRIBER/CLIN PHARMACIST DOCUMENTED: ICD-10-PCS | Mod: CPTII,95,, | Performed by: NURSE PRACTITIONER

## 2023-10-09 PROCEDURE — 99214 OFFICE O/P EST MOD 30 MIN: CPT | Mod: 95,,, | Performed by: NURSE PRACTITIONER

## 2023-10-09 PROCEDURE — 1126F PR PAIN SEVERITY QUANTIFIED, NO PAIN PRESENT: ICD-10-PCS | Mod: CPTII,95,, | Performed by: NURSE PRACTITIONER

## 2023-10-09 PROCEDURE — 1159F PR MEDICATION LIST DOCUMENTED IN MEDICAL RECORD: ICD-10-PCS | Mod: CPTII,95,, | Performed by: NURSE PRACTITIONER

## 2023-10-09 PROCEDURE — 1157F PR ADVANCE CARE PLAN OR EQUIV PRESENT IN MEDICAL RECORD: ICD-10-PCS | Mod: CPTII,95,, | Performed by: NURSE PRACTITIONER

## 2023-10-09 PROCEDURE — 3074F PR MOST RECENT SYSTOLIC BLOOD PRESSURE < 130 MM HG: ICD-10-PCS | Mod: CPTII,95,, | Performed by: NURSE PRACTITIONER

## 2023-10-09 PROCEDURE — 99214 PR OFFICE/OUTPT VISIT, EST, LEVL IV, 30-39 MIN: ICD-10-PCS | Mod: 95,,, | Performed by: NURSE PRACTITIONER

## 2023-10-09 PROCEDURE — 1126F AMNT PAIN NOTED NONE PRSNT: CPT | Mod: CPTII,95,, | Performed by: NURSE PRACTITIONER

## 2023-10-09 PROCEDURE — 3288F FALL RISK ASSESSMENT DOCD: CPT | Mod: CPTII,95,, | Performed by: NURSE PRACTITIONER

## 2023-10-16 ENCOUNTER — PATIENT MESSAGE (OUTPATIENT)
Dept: PSYCHIATRY | Facility: CLINIC | Age: 78
End: 2023-10-16
Payer: MEDICARE

## 2023-10-16 DIAGNOSIS — G47.33 OSA (OBSTRUCTIVE SLEEP APNEA): Primary | ICD-10-CM

## 2023-10-17 ENCOUNTER — PATIENT MESSAGE (OUTPATIENT)
Dept: FAMILY MEDICINE | Facility: CLINIC | Age: 78
End: 2023-10-17
Payer: MEDICARE

## 2023-10-17 ENCOUNTER — OFFICE VISIT (OUTPATIENT)
Dept: OBSTETRICS AND GYNECOLOGY | Facility: CLINIC | Age: 78
End: 2023-10-17
Payer: MEDICARE

## 2023-10-17 VITALS
WEIGHT: 108.94 LBS | SYSTOLIC BLOOD PRESSURE: 110 MMHG | BODY MASS INDEX: 17.1 KG/M2 | HEIGHT: 67 IN | DIASTOLIC BLOOD PRESSURE: 70 MMHG

## 2023-10-17 DIAGNOSIS — F32.A DEPRESSION, UNSPECIFIED DEPRESSION TYPE: Primary | ICD-10-CM

## 2023-10-17 DIAGNOSIS — Z71.89 COUNSELING FOR HORMONE REPLACEMENT THERAPY: ICD-10-CM

## 2023-10-17 PROCEDURE — 1157F PR ADVANCE CARE PLAN OR EQUIV PRESENT IN MEDICAL RECORD: ICD-10-PCS | Mod: CPTII,S$GLB,, | Performed by: OBSTETRICS & GYNECOLOGY

## 2023-10-17 PROCEDURE — 1159F MED LIST DOCD IN RCRD: CPT | Mod: CPTII,S$GLB,, | Performed by: OBSTETRICS & GYNECOLOGY

## 2023-10-17 PROCEDURE — 3074F PR MOST RECENT SYSTOLIC BLOOD PRESSURE < 130 MM HG: ICD-10-PCS | Mod: CPTII,S$GLB,, | Performed by: OBSTETRICS & GYNECOLOGY

## 2023-10-17 PROCEDURE — 1101F PT FALLS ASSESS-DOCD LE1/YR: CPT | Mod: CPTII,S$GLB,, | Performed by: OBSTETRICS & GYNECOLOGY

## 2023-10-17 PROCEDURE — 99213 PR OFFICE/OUTPT VISIT, EST, LEVL III, 20-29 MIN: ICD-10-PCS | Mod: S$GLB,,, | Performed by: OBSTETRICS & GYNECOLOGY

## 2023-10-17 PROCEDURE — 3078F DIAST BP <80 MM HG: CPT | Mod: CPTII,S$GLB,, | Performed by: OBSTETRICS & GYNECOLOGY

## 2023-10-17 PROCEDURE — 1157F ADVNC CARE PLAN IN RCRD: CPT | Mod: CPTII,S$GLB,, | Performed by: OBSTETRICS & GYNECOLOGY

## 2023-10-17 PROCEDURE — 3288F FALL RISK ASSESSMENT DOCD: CPT | Mod: CPTII,S$GLB,, | Performed by: OBSTETRICS & GYNECOLOGY

## 2023-10-17 PROCEDURE — 1126F PR PAIN SEVERITY QUANTIFIED, NO PAIN PRESENT: ICD-10-PCS | Mod: CPTII,S$GLB,, | Performed by: OBSTETRICS & GYNECOLOGY

## 2023-10-17 PROCEDURE — 1159F PR MEDICATION LIST DOCUMENTED IN MEDICAL RECORD: ICD-10-PCS | Mod: CPTII,S$GLB,, | Performed by: OBSTETRICS & GYNECOLOGY

## 2023-10-17 PROCEDURE — 3074F SYST BP LT 130 MM HG: CPT | Mod: CPTII,S$GLB,, | Performed by: OBSTETRICS & GYNECOLOGY

## 2023-10-17 PROCEDURE — 99213 OFFICE O/P EST LOW 20 MIN: CPT | Mod: S$GLB,,, | Performed by: OBSTETRICS & GYNECOLOGY

## 2023-10-17 PROCEDURE — 99999 PR PBB SHADOW E&M-EST. PATIENT-LVL III: ICD-10-PCS | Mod: PBBFAC,,, | Performed by: OBSTETRICS & GYNECOLOGY

## 2023-10-17 PROCEDURE — 3288F PR FALLS RISK ASSESSMENT DOCUMENTED: ICD-10-PCS | Mod: CPTII,S$GLB,, | Performed by: OBSTETRICS & GYNECOLOGY

## 2023-10-17 PROCEDURE — 1101F PR PT FALLS ASSESS DOC 0-1 FALLS W/OUT INJ PAST YR: ICD-10-PCS | Mod: CPTII,S$GLB,, | Performed by: OBSTETRICS & GYNECOLOGY

## 2023-10-17 PROCEDURE — 99999 PR PBB SHADOW E&M-EST. PATIENT-LVL III: CPT | Mod: PBBFAC,,, | Performed by: OBSTETRICS & GYNECOLOGY

## 2023-10-17 PROCEDURE — 1126F AMNT PAIN NOTED NONE PRSNT: CPT | Mod: CPTII,S$GLB,, | Performed by: OBSTETRICS & GYNECOLOGY

## 2023-10-17 PROCEDURE — 3078F PR MOST RECENT DIASTOLIC BLOOD PRESSURE < 80 MM HG: ICD-10-PCS | Mod: CPTII,S$GLB,, | Performed by: OBSTETRICS & GYNECOLOGY

## 2023-10-17 NOTE — PROGRESS NOTES
Here w  to discuss HRT - treatment for depression. She had intact uterus, but is also on anticoagulation long term, which is a contraindications to HRT.     Allso h/o stroke with some left sided mouth numbness x 12 hours on and off, counseled - recommended contact primary care for possible head imaging.     15 minutes spent with patient with > 1/2 time in counseling.

## 2023-10-18 ENCOUNTER — OFFICE VISIT (OUTPATIENT)
Dept: FAMILY MEDICINE | Facility: CLINIC | Age: 78
End: 2023-10-18
Payer: MEDICARE

## 2023-10-18 VITALS
RESPIRATION RATE: 16 BRPM | BODY MASS INDEX: 16.88 KG/M2 | DIASTOLIC BLOOD PRESSURE: 80 MMHG | SYSTOLIC BLOOD PRESSURE: 134 MMHG | HEART RATE: 77 BPM | WEIGHT: 107.56 LBS | HEIGHT: 67 IN | OXYGEN SATURATION: 98 %

## 2023-10-18 DIAGNOSIS — J06.9 VIRAL URI: Primary | ICD-10-CM

## 2023-10-18 DIAGNOSIS — R20.2 FACIAL PARESTHESIA: ICD-10-CM

## 2023-10-18 PROCEDURE — 3079F DIAST BP 80-89 MM HG: CPT | Mod: CPTII,S$GLB,, | Performed by: FAMILY MEDICINE

## 2023-10-18 PROCEDURE — 1126F PR PAIN SEVERITY QUANTIFIED, NO PAIN PRESENT: ICD-10-PCS | Mod: CPTII,S$GLB,, | Performed by: FAMILY MEDICINE

## 2023-10-18 PROCEDURE — 1160F RVW MEDS BY RX/DR IN RCRD: CPT | Mod: CPTII,S$GLB,, | Performed by: FAMILY MEDICINE

## 2023-10-18 PROCEDURE — 3288F FALL RISK ASSESSMENT DOCD: CPT | Mod: CPTII,S$GLB,, | Performed by: FAMILY MEDICINE

## 2023-10-18 PROCEDURE — 1157F PR ADVANCE CARE PLAN OR EQUIV PRESENT IN MEDICAL RECORD: ICD-10-PCS | Mod: CPTII,S$GLB,, | Performed by: FAMILY MEDICINE

## 2023-10-18 PROCEDURE — 3075F PR MOST RECENT SYSTOLIC BLOOD PRESS GE 130-139MM HG: ICD-10-PCS | Mod: CPTII,S$GLB,, | Performed by: FAMILY MEDICINE

## 2023-10-18 PROCEDURE — 1160F PR REVIEW ALL MEDS BY PRESCRIBER/CLIN PHARMACIST DOCUMENTED: ICD-10-PCS | Mod: CPTII,S$GLB,, | Performed by: FAMILY MEDICINE

## 2023-10-18 PROCEDURE — 1101F PT FALLS ASSESS-DOCD LE1/YR: CPT | Mod: CPTII,S$GLB,, | Performed by: FAMILY MEDICINE

## 2023-10-18 PROCEDURE — 99999 PR PBB SHADOW E&M-EST. PATIENT-LVL IV: CPT | Mod: PBBFAC,,, | Performed by: FAMILY MEDICINE

## 2023-10-18 PROCEDURE — 3288F PR FALLS RISK ASSESSMENT DOCUMENTED: ICD-10-PCS | Mod: CPTII,S$GLB,, | Performed by: FAMILY MEDICINE

## 2023-10-18 PROCEDURE — 99213 PR OFFICE/OUTPT VISIT, EST, LEVL III, 20-29 MIN: ICD-10-PCS | Mod: S$GLB,,, | Performed by: FAMILY MEDICINE

## 2023-10-18 PROCEDURE — 1159F MED LIST DOCD IN RCRD: CPT | Mod: CPTII,S$GLB,, | Performed by: FAMILY MEDICINE

## 2023-10-18 PROCEDURE — 99999 PR PBB SHADOW E&M-EST. PATIENT-LVL IV: ICD-10-PCS | Mod: PBBFAC,,, | Performed by: FAMILY MEDICINE

## 2023-10-18 PROCEDURE — 3075F SYST BP GE 130 - 139MM HG: CPT | Mod: CPTII,S$GLB,, | Performed by: FAMILY MEDICINE

## 2023-10-18 PROCEDURE — 1159F PR MEDICATION LIST DOCUMENTED IN MEDICAL RECORD: ICD-10-PCS | Mod: CPTII,S$GLB,, | Performed by: FAMILY MEDICINE

## 2023-10-18 PROCEDURE — 1101F PR PT FALLS ASSESS DOC 0-1 FALLS W/OUT INJ PAST YR: ICD-10-PCS | Mod: CPTII,S$GLB,, | Performed by: FAMILY MEDICINE

## 2023-10-18 PROCEDURE — 3079F PR MOST RECENT DIASTOLIC BLOOD PRESSURE 80-89 MM HG: ICD-10-PCS | Mod: CPTII,S$GLB,, | Performed by: FAMILY MEDICINE

## 2023-10-18 PROCEDURE — 99213 OFFICE O/P EST LOW 20 MIN: CPT | Mod: S$GLB,,, | Performed by: FAMILY MEDICINE

## 2023-10-18 PROCEDURE — 1157F ADVNC CARE PLAN IN RCRD: CPT | Mod: CPTII,S$GLB,, | Performed by: FAMILY MEDICINE

## 2023-10-18 PROCEDURE — 1126F AMNT PAIN NOTED NONE PRSNT: CPT | Mod: CPTII,S$GLB,, | Performed by: FAMILY MEDICINE

## 2023-10-18 NOTE — PATIENT INSTRUCTIONS
Vitamin C 1,000 mg - ensure at least this much - or 1,000 mg twice a day - can get chewable 500 mg per tablet.    Contact your PCP if any worsening or for any new concerns as we discussed.  Secondary infection or any other sign of stroke.

## 2023-10-18 NOTE — PROGRESS NOTES
Subjective:       Patient ID: Ayla Dela Curz is a 78 y.o. female.    Chief Complaint: No chief complaint on file.    New to me patient here for UC visit.  Numbness to left lower face in past 2 days; variable degree and has resolved fully now.  There was no drooping or other facial asymmetry; no arm or leg weakness or numbness.  No HA.  She has had a cold x 2 weeks with congestion, clear runny nose and watery eyes.  No sinus pain.   She is on Eliquis and ASA already.      Review of Systems   Constitutional:  Negative for fever.   HENT:  Positive for congestion and rhinorrhea.    Respiratory:  Negative for shortness of breath.    Cardiovascular:  Negative for chest pain.   Gastrointestinal:  Negative for abdominal pain and nausea.   Skin:  Negative for rash.   All other systems reviewed and are negative.      Objective:      Physical Exam  Constitutional:       General: She is not in acute distress.     Appearance: She is well-developed.   HENT:      Right Ear: Tympanic membrane normal. Tympanic membrane is not erythematous.      Left Ear: Tympanic membrane normal. Tympanic membrane is not erythematous.      Nose: Mucosal edema present.      Right Sinus: No maxillary sinus tenderness.      Left Sinus: No maxillary sinus tenderness.      Mouth/Throat:      Pharynx: Posterior oropharyngeal erythema present.   Cardiovascular:      Rate and Rhythm: Normal rate and regular rhythm.      Heart sounds: No murmur heard.  Pulmonary:      Effort: Pulmonary effort is normal.      Breath sounds: Normal breath sounds.   Musculoskeletal:      Cervical back: Neck supple.   Lymphadenopathy:      Cervical: No cervical adenopathy.   Skin:     General: Skin is warm and dry.   Neurological:      General: No focal deficit present.      Cranial Nerves: Cranial nerves 2-12 are intact.      Sensory: Sensation is intact.      Motor: Motor function is intact.      Comments: Sensory - soft touch to all levels of face are intact and equal  b/l.         Assessment:       1. Viral URI    2. Facial paresthesia        Plan:       Viral URI    Facial paresthesia      Patient Instructions   Vitamin C 1,000 mg - ensure at least this much - or 1,000 mg twice a day - can get chewable 500 mg per tablet.    Contact your PCP if any worsening or for any new concerns as we discussed.  Secondary infection or any other sign of stroke.

## 2023-10-19 ENCOUNTER — OFFICE VISIT (OUTPATIENT)
Dept: PSYCHIATRY | Facility: CLINIC | Age: 78
End: 2023-10-19
Payer: MEDICARE

## 2023-10-19 ENCOUNTER — CLINICAL SUPPORT (OUTPATIENT)
Dept: DERMATOLOGY | Facility: CLINIC | Age: 78
End: 2023-10-19
Payer: MEDICARE

## 2023-10-19 VITALS
BODY MASS INDEX: 16.8 KG/M2 | WEIGHT: 107.06 LBS | HEIGHT: 67 IN | HEART RATE: 79 BPM | DIASTOLIC BLOOD PRESSURE: 81 MMHG | SYSTOLIC BLOOD PRESSURE: 141 MMHG

## 2023-10-19 DIAGNOSIS — F41.1 GENERALIZED ANXIETY DISORDER: ICD-10-CM

## 2023-10-19 DIAGNOSIS — F41.0 PANIC DISORDER: ICD-10-CM

## 2023-10-19 DIAGNOSIS — F40.298 SPECIFIC PHOBIA: ICD-10-CM

## 2023-10-19 DIAGNOSIS — F33.1 MAJOR DEPRESSIVE DISORDER, RECURRENT, MODERATE: Primary | ICD-10-CM

## 2023-10-19 DIAGNOSIS — C44.619 BASAL CELL CARCINOMA (BCC) OF LEFT SHOULDER: Primary | ICD-10-CM

## 2023-10-19 PROCEDURE — 99024 PR POST-OP FOLLOW-UP VISIT: ICD-10-PCS | Mod: S$GLB,,, | Performed by: DERMATOLOGY

## 2023-10-19 PROCEDURE — 99214 OFFICE O/P EST MOD 30 MIN: CPT | Mod: S$GLB,,, | Performed by: PSYCHOLOGIST

## 2023-10-19 PROCEDURE — 3077F SYST BP >= 140 MM HG: CPT | Mod: CPTII,S$GLB,, | Performed by: PSYCHOLOGIST

## 2023-10-19 PROCEDURE — 99024 POSTOP FOLLOW-UP VISIT: CPT | Mod: S$GLB,,, | Performed by: DERMATOLOGY

## 2023-10-19 PROCEDURE — 99214 PR OFFICE/OUTPT VISIT, EST, LEVL IV, 30-39 MIN: ICD-10-PCS | Mod: S$GLB,,, | Performed by: PSYCHOLOGIST

## 2023-10-19 PROCEDURE — 3079F PR MOST RECENT DIASTOLIC BLOOD PRESSURE 80-89 MM HG: ICD-10-PCS | Mod: CPTII,S$GLB,, | Performed by: PSYCHOLOGIST

## 2023-10-19 PROCEDURE — 1101F PT FALLS ASSESS-DOCD LE1/YR: CPT | Mod: CPTII,S$GLB,, | Performed by: PSYCHOLOGIST

## 2023-10-19 PROCEDURE — 1159F MED LIST DOCD IN RCRD: CPT | Mod: CPTII,S$GLB,, | Performed by: PSYCHOLOGIST

## 2023-10-19 PROCEDURE — 1126F AMNT PAIN NOTED NONE PRSNT: CPT | Mod: CPTII,S$GLB,, | Performed by: PSYCHOLOGIST

## 2023-10-19 PROCEDURE — 1126F PR PAIN SEVERITY QUANTIFIED, NO PAIN PRESENT: ICD-10-PCS | Mod: CPTII,S$GLB,, | Performed by: PSYCHOLOGIST

## 2023-10-19 PROCEDURE — 3288F PR FALLS RISK ASSESSMENT DOCUMENTED: ICD-10-PCS | Mod: CPTII,S$GLB,, | Performed by: PSYCHOLOGIST

## 2023-10-19 PROCEDURE — 99999 PR PBB SHADOW E&M-EST. PATIENT-LVL III: ICD-10-PCS | Mod: PBBFAC,,, | Performed by: PSYCHOLOGIST

## 2023-10-19 PROCEDURE — 1157F ADVNC CARE PLAN IN RCRD: CPT | Mod: CPTII,S$GLB,, | Performed by: PSYCHOLOGIST

## 2023-10-19 PROCEDURE — 1101F PR PT FALLS ASSESS DOC 0-1 FALLS W/OUT INJ PAST YR: ICD-10-PCS | Mod: CPTII,S$GLB,, | Performed by: PSYCHOLOGIST

## 2023-10-19 PROCEDURE — 1159F PR MEDICATION LIST DOCUMENTED IN MEDICAL RECORD: ICD-10-PCS | Mod: CPTII,S$GLB,, | Performed by: PSYCHOLOGIST

## 2023-10-19 PROCEDURE — 3079F DIAST BP 80-89 MM HG: CPT | Mod: CPTII,S$GLB,, | Performed by: PSYCHOLOGIST

## 2023-10-19 PROCEDURE — 3288F FALL RISK ASSESSMENT DOCD: CPT | Mod: CPTII,S$GLB,, | Performed by: PSYCHOLOGIST

## 2023-10-19 PROCEDURE — 3077F PR MOST RECENT SYSTOLIC BLOOD PRESSURE >= 140 MM HG: ICD-10-PCS | Mod: CPTII,S$GLB,, | Performed by: PSYCHOLOGIST

## 2023-10-19 PROCEDURE — 99999 PR PBB SHADOW E&M-EST. PATIENT-LVL III: CPT | Mod: PBBFAC,,, | Performed by: PSYCHOLOGIST

## 2023-10-19 PROCEDURE — 1157F PR ADVANCE CARE PLAN OR EQUIV PRESENT IN MEDICAL RECORD: ICD-10-PCS | Mod: CPTII,S$GLB,, | Performed by: PSYCHOLOGIST

## 2023-10-19 NOTE — PROGRESS NOTES
Outpatient Psychiatry Follow-Up Visit    Clinical Status of Patient: Outpatient (Ambulatory)  10/19/2023     Chief Complaint: 76 y/o female presenting today with  for a follow-up.       Interval History and Content of Current Session:  Interim Events/Subjective Report/Content of Current Session: 76 y/o female follow-up appointment.    Pt is a 76 y/o female with past psychiatric hx of depression, anxiety, eating disorder who presents for follow-up treatment. Pt reported that she has been ill for a few days. Appetite has decreased and losing some weight. Has completed  two sleep studies with results suggesting NILSA. Will be picking up a CPAP next week.    Past Psychiatric hx: remeron, rexulti, vraylar, Limbitrol, venlafaxine, fluoxetine, gabapentin, cannot remember others.     Past Medical hx:   Past Medical History:   Diagnosis Date    Anticoagulant long-term use     Anxiety     Arthritis     Atrial fibrillation     Basal cell carcinoma     Cancer     skin    CHF (congestive heart failure)     Depression     DVT (deep venous thrombosis)     GERD (gastroesophageal reflux disease)     Glaucoma (increased eye pressure)     Hyperlipidemia     diet controlled    Hypertension     Interstitial lung disease     Localized hives 01/10/2020    Mild persistent asthma without complication 11/12/2018    Pacemaker     Pneumonia 01/31/2014    Stroke 06/03/2014    Stroke     TIA (transient ischemic attack)     TIA (transient ischemic attack)         Interim hx:  Medication changes last visit: Return buspirone to 10 mg TID  Anxiety: decrease  Depression: decrease     Denies suicidal/homicidal ideations.  Denies hopelessness/worthlessness.    Denies auditory/visual hallucinations      Alcohol: Infrequent use  Drug: Pt denied  Caffeine: Not assessed  Tobacco: Pt denied      Review of Systems   PSYCHIATRIC: Pertinent items are noted in the narrative.        CONSTITUTIONAL: weight stable    Past Medical, Family and Social History:  The patient's past medical, family and social history have been reviewed and updated as appropriate within the electronic medical record. See encounter notes.     Current Psychiatric Medication:  ativan 0.5 mg qd PRN, buspirone 10 mg TID, trintillex 20 mg     Compliance: yes      Side effects: Pt denied     Risk Parameters:  Patient reports no suicidal ideation  Patient reports no homicidal ideation  Patient reports no self-injurious behavior  Patient reports no violent behavior     Exam (detailed: at least 9 elements; comprehensive: all 15 elements)   Constitutional  Vitals:  Most recent vital signs, dated less than 90 days prior to this appointment, were reviewed. Pulse:  [79]   BP: (141)/(81)       General:  unremarkable, age appropriate, casual attire, good eye contact, good rapport       Musculoskeletal  Muscle Strength/Tone:  no flaccidity, no tremor    Gait & Station:  normal      Psychiatric                       Speech:  normal tone, normal rate, rhythm, and volume   Mood & Affect:   Mildly Depressed, anxious         Thought Process:   Goal directed; Linear    Associations:   intact   Thought Content:   No SI/HI, delusions, or paranoia, no AV/VH   Insight & Judgement:   Good, adequate to circumstances   Orientation:   grossly intact; alert and oriented x 4    Memory:  intact for content of interview    Language:  grossly intact, can repeat    Attention Span  : Grossly intact for content of interview   Fund of Knowledge:   intact and appropriate to age and level of education        Assessment and Diagnosis   Status/Progress: Based on the examination today, the patient's problem(s) is/are adequately controlled.  New problems have not been presented today. Co-morbidities are not complicating management of the primary condition. There are no active rule-out diagnoses for this patient at this time.      Impression: Pt continues to improve overall and responding well to reducing polypharmacy and medication  dosing. Gait has improved. Increased verbal responses in session. Will continue with medication plan and monitor symptoms moving forward.     Diagnosis:   1) Major Depressive Disorder, recurrent, moderate  2) Generalized Anxiety Disorder  3) Specific Phobia  4) Panic Disorder   5) Personality Disorder traits  6) R/o Dementia of unknown etiology  Intervention/Counseling/Treatment Plan   Medication Management:      1. ativan 0.5 mg qd PRN    2. buspirone to 10 mg TID    3. trintillex to 20 mg    4. Call to report any worsening of symptoms or problems with the medication. Pt instructed to go to ER with thoughts of harming self, others     5. Cont in therapy with Graeme Meyer LCSW    Psychotherapy: none   Target symptoms: anxiety  Why chosen therapy is appropriate versus another modality: CBT used; relevant to diagnosis, patient responds to this modality  Outcome monitoring methods: self-report, observation  Therapeutic intervention type: Cognitive Behavioral Therapy, Solution-focused  Topics discussed/themes: building skills sets for symptom management, symptom recognition, nutrition, exercise  The patient's response to the intervention is accepting  Patient's response to treatment is: good.   The patient's progress toward treatment goals: limited to fair     Return to clinic: 2 months    -Cognitive-Behavioral/Supportive therapy and psychoeducation provided  -R/B/SE's of medications discussed with the pt who expresses understanding and chooses to take medications as prescribed.   -Pt instructed to call clinic, 911 or go to nearest emergency room if sxs worsen or pt is in   crisis. The pt expresses understanding.    Galo Lipscomb, PhD, MP     Antidepressant/Antianxiety Medication Initiation:  Patient informed of risks, benefits, and potential side effects of medication and accepts informed consent.  Common side effects include nausea, fatigue, headache, insomnia., Specifically discussed the possibility of new or  worsening suicidal thoughts/depression.  Patient instructed to stop the medication immediately and seek urgent treatment if this occurs. Patient instructed not to abruptly discontinue medication without physician guidance except in cases of sudden onset or worsening of SI.       Stimulant Medication Initiation:  Patient advised of risks, benefits, and side effects of medication and accepts informed consent.  Common side effects include insomnia, irritability, jittery feeling, dry mouth, and agitation/hostility., Patient advised of potential addictive nature of medication and controlled substance classification.  Instructed to safeguard medication as no early refills can be given for lost or stolen medications.       Benzodiazepine Initiation:  Patient advised of the risks, benefits, and common side effects of medication and has accepted informed consent.  Common side effects include drowsiness, impaired coordination, possible memory loss., Patient advised NOT to operate a vehicle or machinery untiil they are sure how the medication will affec them.  Client also advised of danger of mixing this medication with alcohol., Patient advised of potential addictive nature of medication and need to safeguard medication as no early refills for lost or stolen medications can be authorized.

## 2023-10-25 ENCOUNTER — HOSPITAL ENCOUNTER (OUTPATIENT)
Facility: HOSPITAL | Age: 78
Discharge: HOME | End: 2023-10-26
Attending: EMERGENCY MEDICINE | Admitting: INTERNAL MEDICINE
Payer: MEDICARE

## 2023-10-25 DIAGNOSIS — I63.9 STROKE: ICD-10-CM

## 2023-10-25 DIAGNOSIS — R06.02 SOB (SHORTNESS OF BREATH): ICD-10-CM

## 2023-10-25 DIAGNOSIS — G45.9 TIA (TRANSIENT ISCHEMIC ATTACK): ICD-10-CM

## 2023-10-25 LAB
ALBUMIN SERPL BCP-MCNC: 3.8 G/DL (ref 3.5–5.2)
ALP SERPL-CCNC: 68 U/L (ref 55–135)
ALT SERPL W/O P-5'-P-CCNC: 13 U/L (ref 10–44)
ANION GAP SERPL CALC-SCNC: 10 MMOL/L (ref 8–16)
AST SERPL-CCNC: 21 U/L (ref 10–40)
BASOPHILS # BLD AUTO: 0.05 K/UL (ref 0–0.2)
BASOPHILS NFR BLD: 0.4 % (ref 0–1.9)
BILIRUB SERPL-MCNC: 1.1 MG/DL (ref 0.1–1)
BNP SERPL-MCNC: 75 PG/ML (ref 0–99)
BUN SERPL-MCNC: 17 MG/DL (ref 8–23)
CALCIUM SERPL-MCNC: 9.4 MG/DL (ref 8.7–10.5)
CHLORIDE SERPL-SCNC: 106 MMOL/L (ref 95–110)
CHOLEST SERPL-MCNC: 218 MG/DL (ref 120–199)
CHOLEST/HDLC SERPL: 2.9 {RATIO} (ref 2–5)
CO2 SERPL-SCNC: 23 MMOL/L (ref 23–29)
CREAT SERPL-MCNC: 0.8 MG/DL (ref 0.5–1.4)
D DIMER PPP IA.FEU-MCNC: 0.33 MG/L FEU
DIFFERENTIAL METHOD: ABNORMAL
EOSINOPHIL # BLD AUTO: 0.2 K/UL (ref 0–0.5)
EOSINOPHIL NFR BLD: 2.2 % (ref 0–8)
ERYTHROCYTE [DISTWIDTH] IN BLOOD BY AUTOMATED COUNT: 13.5 % (ref 11.5–14.5)
EST. GFR  (NO RACE VARIABLE): >60 ML/MIN/1.73 M^2
GLUCOSE SERPL-MCNC: 97 MG/DL (ref 70–110)
HCT VFR BLD AUTO: 43 % (ref 37–48.5)
HDLC SERPL-MCNC: 74 MG/DL (ref 40–75)
HDLC SERPL: 33.9 % (ref 20–50)
HGB BLD-MCNC: 14.1 G/DL (ref 12–16)
IMM GRANULOCYTES # BLD AUTO: 0.06 K/UL (ref 0–0.04)
IMM GRANULOCYTES NFR BLD AUTO: 0.5 % (ref 0–0.5)
INR PPP: 1.1 (ref 0.8–1.2)
LDLC SERPL CALC-MCNC: 129 MG/DL (ref 63–159)
LYMPHOCYTES # BLD AUTO: 1.2 K/UL (ref 1–4.8)
LYMPHOCYTES NFR BLD: 10.7 % (ref 18–48)
MCH RBC QN AUTO: 31.3 PG (ref 27–31)
MCHC RBC AUTO-ENTMCNC: 32.8 G/DL (ref 32–36)
MCV RBC AUTO: 95 FL (ref 82–98)
MONOCYTES # BLD AUTO: 1 K/UL (ref 0.3–1)
MONOCYTES NFR BLD: 8.6 % (ref 4–15)
NEUTROPHILS # BLD AUTO: 8.6 K/UL (ref 1.8–7.7)
NEUTROPHILS NFR BLD: 77.6 % (ref 38–73)
NONHDLC SERPL-MCNC: 144 MG/DL
NRBC BLD-RTO: 0 /100 WBC
PLATELET # BLD AUTO: 272 K/UL (ref 150–450)
PMV BLD AUTO: 9.1 FL (ref 9.2–12.9)
POTASSIUM SERPL-SCNC: 3.7 MMOL/L (ref 3.5–5.1)
PROT SERPL-MCNC: 6.9 G/DL (ref 6–8.4)
PROTHROMBIN TIME: 11.4 SEC (ref 9–12.5)
RBC # BLD AUTO: 4.51 M/UL (ref 4–5.4)
SARS-COV-2 RDRP RESP QL NAA+PROBE: NEGATIVE
SODIUM SERPL-SCNC: 139 MMOL/L (ref 136–145)
TRIGL SERPL-MCNC: 75 MG/DL (ref 30–150)
TROPONIN I SERPL DL<=0.01 NG/ML-MCNC: <0.006 NG/ML (ref 0–0.03)
TSH SERPL DL<=0.005 MIU/L-ACNC: 1.08 UIU/ML (ref 0.4–4)
WBC # BLD AUTO: 11.14 K/UL (ref 3.9–12.7)

## 2023-10-25 PROCEDURE — 36415 COLL VENOUS BLD VENIPUNCTURE: CPT | Performed by: INTERNAL MEDICINE

## 2023-10-25 PROCEDURE — 85379 FIBRIN DEGRADATION QUANT: CPT | Performed by: NURSE PRACTITIONER

## 2023-10-25 PROCEDURE — 84484 ASSAY OF TROPONIN QUANT: CPT | Performed by: NURSE PRACTITIONER

## 2023-10-25 PROCEDURE — 93010 EKG 12-LEAD: ICD-10-PCS | Mod: ,,, | Performed by: GENERAL PRACTICE

## 2023-10-25 PROCEDURE — 80053 COMPREHEN METABOLIC PANEL: CPT | Performed by: NURSE PRACTITIONER

## 2023-10-25 PROCEDURE — 25000003 PHARM REV CODE 250: Performed by: INTERNAL MEDICINE

## 2023-10-25 PROCEDURE — 84443 ASSAY THYROID STIM HORMONE: CPT | Performed by: NURSE PRACTITIONER

## 2023-10-25 PROCEDURE — 83880 ASSAY OF NATRIURETIC PEPTIDE: CPT | Performed by: NURSE PRACTITIONER

## 2023-10-25 PROCEDURE — G0378 HOSPITAL OBSERVATION PER HR: HCPCS

## 2023-10-25 PROCEDURE — U0002 COVID-19 LAB TEST NON-CDC: HCPCS | Performed by: NURSE PRACTITIONER

## 2023-10-25 PROCEDURE — 94761 N-INVAS EAR/PLS OXIMETRY MLT: CPT

## 2023-10-25 PROCEDURE — 99285 EMERGENCY DEPT VISIT HI MDM: CPT | Mod: 25

## 2023-10-25 PROCEDURE — 93010 ELECTROCARDIOGRAM REPORT: CPT | Mod: ,,, | Performed by: GENERAL PRACTICE

## 2023-10-25 PROCEDURE — 85610 PROTHROMBIN TIME: CPT | Performed by: NURSE PRACTITIONER

## 2023-10-25 PROCEDURE — 36415 COLL VENOUS BLD VENIPUNCTURE: CPT | Performed by: NURSE PRACTITIONER

## 2023-10-25 PROCEDURE — 93005 ELECTROCARDIOGRAM TRACING: CPT

## 2023-10-25 PROCEDURE — 80061 LIPID PANEL: CPT | Performed by: INTERNAL MEDICINE

## 2023-10-25 PROCEDURE — 85025 COMPLETE CBC W/AUTO DIFF WBC: CPT | Performed by: NURSE PRACTITIONER

## 2023-10-25 PROCEDURE — 25500020 PHARM REV CODE 255

## 2023-10-25 RX ORDER — ONDANSETRON 2 MG/ML
4 INJECTION INTRAMUSCULAR; INTRAVENOUS EVERY 12 HOURS PRN
Status: DISCONTINUED | OUTPATIENT
Start: 2023-10-25 | End: 2023-10-26 | Stop reason: HOSPADM

## 2023-10-25 RX ORDER — NAPROXEN SODIUM 220 MG/1
81 TABLET, FILM COATED ORAL DAILY
Status: DISCONTINUED | OUTPATIENT
Start: 2023-10-26 | End: 2023-10-26

## 2023-10-25 RX ORDER — PANTOPRAZOLE SODIUM 40 MG/1
40 TABLET, DELAYED RELEASE ORAL DAILY
Status: DISCONTINUED | OUTPATIENT
Start: 2023-10-25 | End: 2023-10-26 | Stop reason: HOSPADM

## 2023-10-25 RX ORDER — SODIUM CHLORIDE 0.9 % (FLUSH) 0.9 %
10 SYRINGE (ML) INJECTION
Status: DISCONTINUED | OUTPATIENT
Start: 2023-10-25 | End: 2023-10-26 | Stop reason: HOSPADM

## 2023-10-25 RX ORDER — LORAZEPAM 0.5 MG/1
0.5 TABLET ORAL 2 TIMES DAILY PRN
Status: DISCONTINUED | OUTPATIENT
Start: 2023-10-25 | End: 2023-10-26 | Stop reason: HOSPADM

## 2023-10-25 RX ORDER — LABETALOL HYDROCHLORIDE 5 MG/ML
10 INJECTION, SOLUTION INTRAVENOUS
Status: DISCONTINUED | OUTPATIENT
Start: 2023-10-25 | End: 2023-10-26 | Stop reason: HOSPADM

## 2023-10-25 RX ORDER — METOPROLOL TARTRATE 25 MG/1
25 TABLET, FILM COATED ORAL 2 TIMES DAILY
Status: DISCONTINUED | OUTPATIENT
Start: 2023-10-25 | End: 2023-10-26 | Stop reason: HOSPADM

## 2023-10-25 RX ORDER — SODIUM CHLORIDE 9 MG/ML
INJECTION, SOLUTION INTRAVENOUS CONTINUOUS
Status: DISCONTINUED | OUTPATIENT
Start: 2023-10-25 | End: 2023-10-26 | Stop reason: HOSPADM

## 2023-10-25 RX ORDER — DORZOLAMIDE HYDROCHLORIDE AND TIMOLOL MALEATE 20; 5 MG/ML; MG/ML
1 SOLUTION/ DROPS OPHTHALMIC 3 TIMES DAILY
Status: DISCONTINUED | OUTPATIENT
Start: 2023-10-25 | End: 2023-10-26 | Stop reason: HOSPADM

## 2023-10-25 RX ORDER — ENOXAPARIN SODIUM 100 MG/ML
40 INJECTION SUBCUTANEOUS EVERY 24 HOURS
Status: DISCONTINUED | OUTPATIENT
Start: 2023-10-25 | End: 2023-10-25 | Stop reason: SDUPTHER

## 2023-10-25 RX ORDER — BUSPIRONE HYDROCHLORIDE 10 MG/1
10 TABLET ORAL 3 TIMES DAILY
Status: DISCONTINUED | OUTPATIENT
Start: 2023-10-25 | End: 2023-10-26 | Stop reason: HOSPADM

## 2023-10-25 RX ADMIN — DORZOLAMIDE HYDROCHLORIDE AND TIMOLOL MALEATE 1 DROP: 20; 5 SOLUTION/ DROPS OPHTHALMIC at 09:10

## 2023-10-25 RX ADMIN — APIXABAN 5 MG: 2.5 TABLET, FILM COATED ORAL at 09:10

## 2023-10-25 RX ADMIN — DORZOLAMIDE HYDROCHLORIDE AND TIMOLOL MALEATE 1 DROP: 20; 5 SOLUTION/ DROPS OPHTHALMIC at 03:10

## 2023-10-25 RX ADMIN — BUSPIRONE HYDROCHLORIDE 10 MG: 10 TABLET ORAL at 09:10

## 2023-10-25 RX ADMIN — METOPROLOL TARTRATE 25 MG: 25 TABLET, FILM COATED ORAL at 09:10

## 2023-10-25 RX ADMIN — BUSPIRONE HYDROCHLORIDE 10 MG: 10 TABLET ORAL at 03:10

## 2023-10-25 RX ADMIN — SODIUM CHLORIDE: 9 INJECTION, SOLUTION INTRAVENOUS at 03:10

## 2023-10-25 RX ADMIN — PANTOPRAZOLE SODIUM 40 MG: 40 TABLET, DELAYED RELEASE ORAL at 03:10

## 2023-10-25 RX ADMIN — IOHEXOL 75 ML: 350 INJECTION, SOLUTION INTRAVENOUS at 04:10

## 2023-10-25 NOTE — ASSESSMENT & PLAN NOTE
Antithrombotics for secondary stroke prevention: Antiplatelets: Aspirin: 81 mg daily    Statins for secondary stroke prevention and hyperlipidemia, if present:    Patient has Statin Intolerance    Aggressive risk factor modification: HTN     Rehab efforts: The patient has been evaluated by a stroke team provider and the therapy needs have been fully considered based off the presenting complaints and exam findings. The following therapy evaluations are needed: PT evaluate and treat, OT evaluate and treat, SLP evaluate and treat    Diagnostics ordered/pending: CTA Head to assess vasculature , HgbA1C to assess blood glucose levels, Lipid Profile to assess cholesterol levels    VTE prophylaxis: Enoxaparin 40 mg SQ every 24 hours    BP parameters: TIA: SBP <220 until imaging confirmation of no infarct

## 2023-10-25 NOTE — HPI
Patient is an 78-year-old  female with past medical history significant for chronic atrial fibrillation (on Eliquis), status post pacemaker placement, prior history of cerebrovascular accident with no residual neuro deficits, history of congestive heart failure, prior history of TIA and history of deep venous thrombosis is being admitted to Hospital Medicine from [facility] Emergency room with complaints of left facial numbness for 2 weeks.  Patient is a poor historian.  Patient's  is present at bedside who is contributing to history of present illness.  Patient denies any new motor deficits, facial asymmetry, speech dysfunction or swallow dysfunction.  At present in the emergency room patient feels her symptoms have resolved.  Patient denies any headache, vision changes, seizure activity, loss of bowel or bladder functions.  Patient also reports low appetite and has lost significant weight which is not out new finding.  Patient denies heat or cold intolerance.  Patient denies chest pain, shortness for breath or calf pain.

## 2023-10-25 NOTE — ED NOTES
Pt ambulatory pulse ox 94% from resting pulse ox 100%, pt reports mild SOB upon exertion. ED NP notified.

## 2023-10-25 NOTE — ED NOTES
"Pt identifiers checked and accurate with Ayla Dela Cruz     Pt presents to ED with complaints of nasal congestion and intermittent SOB x 1 week. Pt reports left side "feels like it's closing up" x 1 month, symptom worse in the morning with resolve throughout the day. Pt denies all other complaints at this time.     "

## 2023-10-25 NOTE — ED NOTES
Pt placed on portable monitor TTX [id number], monitor room notified and verified visibility of patient on monitor.

## 2023-10-25 NOTE — SUBJECTIVE & OBJECTIVE
Past Medical History:   Diagnosis Date    Anticoagulant long-term use     Anxiety     Arthritis     Atrial fibrillation     Basal cell carcinoma     Cancer     skin    CHF (congestive heart failure)     Depression     DVT (deep venous thrombosis)     GERD (gastroesophageal reflux disease)     Glaucoma (increased eye pressure)     Hyperlipidemia     diet controlled    Hypertension     Interstitial lung disease     Localized hives 01/10/2020    Mild persistent asthma without complication 11/12/2018    Pacemaker     Pneumonia 01/31/2014    Stroke 06/03/2014    Stroke     TIA (transient ischemic attack)     TIA (transient ischemic attack)        Past Surgical History:   Procedure Laterality Date    2 heart ablations      3 in total    A-V CARDIAC PACEMAKER INSERTION Left 10/14/2021    Procedure: INSERTION, CARDIAC PACEMAKER, DUAL CHAMBER;  Surgeon: Fco Calderon MD;  Location: [facility] EP LAB;  Service: Cardiology;  Laterality: Left;  PAF/ [place] Arrthymias, Dual PPM, SJM, MAC, GP, 3 PREP    ANTERIOR VITRECTOMY Right 7/27/2022    Procedure: VITRECTOMY, ANTERIOR APPROACH;  Surgeon: Daniel Tan MD;  Location: [facility] OR;  Service: Ophthalmology;  Laterality: Right;    bilateral cataracts      CHOLECYSTECTOMY      COLONOSCOPY N/A 1/19/2017    Procedure: COLONOSCOPY and EGD;  Surgeon: Jonathan Schultz MD;  Location: [facility];  Service: Endoscopy;  Laterality: N/A;    COLONOSCOPY N/A 10/10/2019    Procedure: COLONOSCOPY;  Surgeon: Alex Christian MD;  Location: [facility] ENDO;  Service: Endoscopy;  Laterality: N/A;    ESOPHAGOGASTRODUODENOSCOPY N/A 10/14/2019    Procedure: EGD (ESOPHAGOGASTRODUODENOSCOPY);  Surgeon: Alex Christian MD;  Location: [facility];  Service: Endoscopy;  Laterality: N/A;    INJECTION OF ANESTHETIC AGENT AROUND MEDIAL BRANCH NERVES INNERVATING CERVICAL FACET JOINT Right 6/7/2018    Procedure: BLOCK, NERVE, FACET JOINT, MEDIAL BRANCH, CERVICAL;  Surgeon: Galo Clancy MD;  Location: [facility] OR;  Service: Pain  Management;  Laterality: Right;  C4, 5, 6    OPEN REDUCTION AND INTERNAL FIXATION (ORIF) OF INJURY OF ANKLE Right 11/1/2018    Procedure: ORIF, ANKLE;  Surgeon: Wilfredo Aguero MD;  Location: [facility];  Service: Orthopedics;  Laterality: Right;    PARACENTESIS, EYE, ANTERIOR CHAMBER, WITH AQUEOUS REMOVAL Right 7/27/2022    Procedure: Anterior chamber washout, possible IOL removal;  Surgeon: Daniel Tan MD;  Location: [facility] OR;  Service: Ophthalmology;  Laterality: Right;    pyloristenosis      RADIOFREQUENCY ABLATION OF LUMBAR MEDIAL BRANCH NERVE AT SINGLE LEVEL Bilateral 9/21/2018    Procedure: RADIOFREQUENCY ABLATION, NERVE, SPINAL, LUMBAR, MEDIAL BRANCH, 1 LEVEL;  Surgeon: Galo Clancy MD;  Location: [facility];  Service: Pain Management;  Laterality: Bilateral;  L3, 4, 5    RADIOFREQUENCY ABLATION OF LUMBAR MEDIAL BRANCH NERVE AT SINGLE LEVEL Bilateral 2/19/2019    Procedure: Radiofrequency Ablation, Nerve, Spinal, Lumbar, Medial Branch, 1 Level;  Surgeon: Galo Clancy MD;  Location: [facility] OR;  Service: Pain Management;  Laterality: Bilateral;  L3, 4, 5     RADIOFREQUENCY ABLATION OF LUMBAR MEDIAL BRANCH NERVE AT SINGLE LEVEL Bilateral 3/10/2020    Procedure: Radiofrequency Ablation, Nerve, Spinal, Lumbar, Medial Branch, 1 Level;  Surgeon: Galo Clancy MD;  Location: [facility] OR;  Service: Pain Management;  Laterality: Bilateral;  L3,4,5 - Burned at 80 degrees C. for 60 seconds x 2 each site      RADIOFREQUENCY ABLATION OF LUMBAR MEDIAL BRANCH NERVE AT SINGLE LEVEL Bilateral 9/11/2020    Procedure: Radiofrequency Ablation, Nerve, Spinal, Lumbar, Medial Branch, 1 Level;  Surgeon: Galo Clancy MD;  Location: [facility];  Service: Pain Management;  Laterality: Bilateral;  L3, 4, 5 - Burned at 80 degrees C. for 60 seconds x 2 each site    RADIOFREQUENCY ABLATION OF LUMBAR MEDIAL BRANCH NERVE AT SINGLE LEVEL Bilateral 5/28/2021    Procedure: Radiofrequency Ablation, Nerve, Spinal, Lumbar, Medial Branch, 1 Level;  Surgeon: Galo  DANIELLE Clancy MD;  Location: [facility] OR;  Service: Pain Management;  Laterality: Bilateral;  L3,4,5    RADIOFREQUENCY THERMAL COAGULATION OF MEDIAL BRANCH OF POSTERIOR RAMUS OF CERVICAL SPINAL NERVE Right 7/3/2018    Procedure: RADIOFREQUENCY THERMAL COAGULATION, NERVE, SPINAL, CERVICAL, MEDIAL BRANCH OF POSTERIOR RAMUS;  Surgeon: Galo Clancy MD;  Location: [facility] OR;  Service: Pain Management;  Laterality: Right;  C4,5,6 - Burned at 80 degrees C. for 75 seconds each site    RADIOFREQUENCY THERMAL COAGULATION OF MEDIAL BRANCH OF POSTERIOR RAMUS OF CERVICAL SPINAL NERVE Right 7/23/2019    Procedure: RADIOFREQUENCY THERMAL COAGULATION, NERVE, SPINAL, CERVICAL, POSTERIOR RAMUS, MEDIAL BRANCH;  Surgeon: Galo Clancy MD;  Location: [facility] OR;  Service: Pain Management;  Laterality: Right;  C4,5,6    RADIOFREQUENCY THERMAL COAGULATION OF MEDIAL BRANCH OF POSTERIOR RAMUS OF CERVICAL SPINAL NERVE Right 6/23/2020    Procedure: RADIOFREQUENCY THERMAL COAGULATION, NERVE, SPINAL, CERVICAL, POSTERIOR RAMUS, MEDIAL BRANCH;  Surgeon: Galo Clancy MD;  Location: [facility] OR;  Service: Pain Management;  Laterality: Right;  C4, 5, 6    RADIOFREQUENCY THERMOCOAGULATION Bilateral 9/10/2019    Procedure: RADIOFREQUENCY THERMAL COAGULATION LUMBAR;  Surgeon: Galo Clancy MD;  Location: [facility] OR;  Service: Pain Management;  Laterality: Bilateral;  L3,4,5 - Burned at 80 degrees C. for 60 seconds x 2 each site    skin cancer removal       TONSILLECTOMY         Review of patient's allergies indicates:   Allergen Reactions    Gabapentin Hallucinations    Xarelto [rivaroxaban] Rash    Bactrim [sulfamethoxazole-trimethoprim] Other (See Comments)     Upset stomach, dry heaves, confusion    Afrin (pseudoephedrine)     Amoxicillin-pot clavulanate      Other reaction(s): Mental Status Change    Atorvastatin      Other reaction(s): Joint pain    Baclofen     Baclofen (bulk) Nausea And Vomiting    Ciprofloxacin     Decongest tabs      Other reaction(s): increased heart  rate    Decongestant [pseudoephedrine hcl]     Erythromycin Other (See Comments)    Flecainide Hives     And SOB. No reaction to Lidocaine     Fluoxetine      Other reaction(s): heart palpitations  Other reaction(s): anxiety    Lisinopril Other (See Comments)     cough    Losartan Other (See Comments)     Hypotension with lightheadedness    Morphine Other (See Comments)     confusion    Tramadol Other (See Comments)     SOB, low BP    Venlafaxine     Venlafaxine analogues      Changes in BP and increases heart rate       Afrin [oxymetazoline] Palpitations    Caffeine Palpitations    Dabigatran etexilate Rash    Tizanidine Anxiety     dizziness       Current Facility-Administered Medications on File Prior to Encounter   Medication    bupivacaine (PF) 0.25% (2.5 mg/ml) injection    lidocaine (PF) 10 mg/ml (1%) injection    lidocaine (PF) 20 mg/ml (2%) injection    methylPREDNISolone acetate injection     Current Outpatient Medications on File Prior to Encounter   Medication Sig    apixaban (ELIQUIS) 5 mg Tab Take 1 tablet (5 mg total) by mouth 2 (two) times daily.    aspirin 81 MG Chew TAKE 1 TABLET BY MOUTH EVERY DAY    atropine 1% (ISOPTO ATROPINE) 1 % Drop Place 1 drop into the right eye 2 (two) times a day.    busPIRone (BUSPAR) 10 MG tablet Take 1 tablet (10 mg total) by mouth 3 (three) times daily.    dorzolamide-timolol 2-0.5% (COSOPT) 22.3-6.8 mg/mL ophthalmic solution Place 1 drop into both eyes 3 (three) times daily.    LORazepam (ATIVAN) 0.5 MG tablet Take 1 tablet (0.5 mg total) by mouth 2 (two) times daily as needed for Anxiety.    metoprolol tartrate (LOPRESSOR) 25 MG tablet TAKE 1 TABLET BY MOUTH TWICE A DAY    pantoprazole (PROTONIX) 40 MG tablet Take 1 tablet (40 mg total) by mouth once daily.    vortioxetine (TRINTELLIX) 20 mg Tab Take 1 tablet (20 mg total) by mouth Daily.    [DISCONTINUED] brimonidine 0.2% (ALPHAGAN) 0.2 % Drop Place 1 drop into the right eye 3 (three) times daily.     Family  History       Problem Relation (Age of Onset)    Alzheimer's disease Maternal Uncle    Arthritis Mother    Asthma Mother    Cancer Maternal Grandfather, Paternal Grandmother    Depression Son    Emphysema Maternal Grandfather    Heart disease Father    Kidney disease Maternal Grandfather    Pneumonia Mother, Paternal Grandfather    Rheum arthritis Mother, Maternal Grandmother    Skin cancer Mother    Ulcers Father          Tobacco Use    Smoking status: Never    Smokeless tobacco: Never   Substance and Sexual Activity    Alcohol use: No    Drug use: No    Sexual activity: Yes     Partners: Male     Review of Systems   Constitutional:  Positive for appetite change.        Chronic weight loss   Neurological:  Positive for numbness.   All other systems reviewed and are negative.    Objective:     Vital Signs (Most Recent):  Temp: 97.3 °F (36.3 °C) (10/25/23 1500)  Pulse: 79 (10/25/23 1500)  Resp: 17 (10/25/23 1500)  BP: (!) 144/67 (10/25/23 1500)  SpO2: 100 % (10/25/23 1500) Vital Signs (24h Range):  Temp:  [97.3 °F (36.3 °C)-98.4 °F (36.9 °C)] 97.3 °F (36.3 °C)  Pulse:  [65-84] 79  Resp:  [17-20] 17  SpO2:  [99 %-100 %] 100 %  BP: (134-162)/(63-72) 144/67     Weight: 47.3 kg (104 lb 4.4 oz)  Body mass index is 16.33 kg/m².     Physical Exam  Constitutional:       Appearance: She is well-developed.   HENT:      Head: Normocephalic and atraumatic.   Eyes:      Conjunctiva/sclera: Conjunctivae normal.      Pupils: Pupils are equal, round, and reactive to light.   Neck:      Thyroid: No thyromegaly.      Vascular: No JVD.   Cardiovascular:      Rate and Rhythm: Normal rate and regular rhythm.      Heart sounds: No murmur heard.     No friction rub. No gallop.   Pulmonary:      Effort: Pulmonary effort is normal.      Breath sounds: Normal breath sounds.   Abdominal:      General: Bowel sounds are normal. There is no distension.      Palpations: Abdomen is soft. There is no mass.      Tenderness: There is no abdominal  "tenderness.   Musculoskeletal:         General: Normal range of motion.      Cervical back: Neck supple.   Skin:     General: Skin is warm and dry.   Neurological:      Mental Status: She is oriented to person, place, and time.      Cranial Nerves: No cranial nerve deficit.   Psychiatric:         Behavior: Behavior normal.              CRANIAL NERVES     CN III, IV, VI   Pupils are equal, round, and reactive to light.       Significant Labs: All pertinent labs within the past 24 hours have been reviewed.  CBC:   Recent Labs   Lab 10/25/23  1020   WBC 11.14   HGB 14.1   HCT 43.0        CMP:   Recent Labs   Lab 10/25/23  1020      K 3.7      CO2 23   GLU 97   BUN 17   CREATININE 0.8   CALCIUM 9.4   PROT 6.9   ALBUMIN 3.8   BILITOT 1.1*   ALKPHOS 68   AST 21   ALT 13   ANIONGAP 10     Lipid Panel:   Recent Labs   Lab 10/25/23  1535   CHOL 218*   HDL 74   LDLCALC 129.0   TRIG 75   CHOLHDL 33.9     Troponin:   Recent Labs   Lab 10/25/23  1020   TROPONINI <0.006     TSH:   Recent Labs   Lab 10/25/23  1020   TSH 1.078     Urine Studies: No results for input(s): "COLORU", "APPEARANCEUA", "PHUR", "SPECGRAV", "PROTEINUA", "GLUCUA", "KETONESU", "BILIRUBINUA", "OCCULTUA", "NITRITE", "UROBILINOGEN", "LEUKOCYTESUR", "RBCUA", "WBCUA", "BACTERIA", "SQUAMEPITHEL", "HYALINECASTS" in the last 48 hours.    Invalid input(s): "WRIGHTSUR"    Significant Imaging:   CXR:Pacemaker in place otherwise negative chest x-ray.    CT Head:  1. There is no acute intracranial abnormality.  There is nonspecific white matter change but there is no hemorrhage, mass or obvious acute infarction.  It should be noted that MRI is more sensitive in the detection of subtle or acute nonhemorrhagic ischemic disease.  2. Right sphenoid sinusitis.    CTA head and neck: Pending.   "

## 2023-10-25 NOTE — H&P
[facility]  History & Physical    Patient Name: Ayla Dela Cruz  MRN: [id number]  Patient Class: OP- Observation  Admission Date: 10/25/2023  Attending Physician: Dale Deal MD   Primary Care Provider: Jan Olivo MD         Patient information was obtained from patient, spouse/SO and ER records.     Subjective:     Principal Problem:TIA (transient ischemic attack)    Chief Complaint:   Chief Complaint   Patient presents with    Shortness of Breath     With nasal congestion. Intermittent for the past week -- worse this AM        HPI: Patient is an 78-year-old  female with past medical history significant for chronic atrial fibrillation (on Eliquis), status post pacemaker placement, prior history of cerebrovascular accident with no residual neuro deficits, history of congestive heart failure, prior history of TIA and history of deep venous thrombosis is being admitted to Hospital Medicine from [facility] Emergency room with complaints of left facial numbness for 2 weeks.  Patient is a poor historian.  Patient's  is present at bedside who is contributing to history of present illness.  Patient denies any new motor deficits, facial asymmetry, speech dysfunction or swallow dysfunction.  At present in the emergency room patient feels her symptoms have resolved.  Patient denies any headache, vision changes, seizure activity, loss of bowel or bladder functions.  Patient also reports low appetite and has lost significant weight which is not out new finding.  Patient denies heat or cold intolerance.  Patient denies chest pain, shortness for breath or calf pain.              Past Medical History:   Diagnosis Date    Anticoagulant long-term use     Anxiety     Arthritis     Atrial fibrillation     Basal cell carcinoma     Cancer     skin    CHF (congestive heart failure)     Depression     DVT (deep venous thrombosis)     GERD  (gastroesophageal reflux disease)     Glaucoma (increased eye pressure)     Hyperlipidemia     diet controlled    Hypertension     Interstitial lung disease     Localized hives 01/10/2020    Mild persistent asthma without complication 11/12/2018    Pacemaker     Pneumonia 01/31/2014    Stroke 06/03/2014    Stroke     TIA (transient ischemic attack)     TIA (transient ischemic attack)        Past Surgical History:   Procedure Laterality Date    2 heart ablations      3 in total    A-V CARDIAC PACEMAKER INSERTION Left 10/14/2021    Procedure: INSERTION, CARDIAC PACEMAKER, DUAL CHAMBER;  Surgeon: Fco Calderon MD;  Location: [facility] EP LAB;  Service: Cardiology;  Laterality: Left;  PAF/ Grandy Arrthymias, Dual PPM, SJM, MAC, GP, 3 PREP    ANTERIOR VITRECTOMY Right 7/27/2022    Procedure: VITRECTOMY, ANTERIOR APPROACH;  Surgeon: Daniel Tan MD;  Location: [facility] OR;  Service: Ophthalmology;  Laterality: Right;    bilateral cataracts      CHOLECYSTECTOMY      COLONOSCOPY N/A 1/19/2017    Procedure: COLONOSCOPY and EGD;  Surgeon: Jonathan Schultz MD;  Location: [facility] ENDO;  Service: Endoscopy;  Laterality: N/A;    COLONOSCOPY N/A 10/10/2019    Procedure: COLONOSCOPY;  Surgeon: Alex Christian MD;  Location: [facility];  Service: Endoscopy;  Laterality: N/A;    ESOPHAGOGASTRODUODENOSCOPY N/A 10/14/2019    Procedure: EGD (ESOPHAGOGASTRODUODENOSCOPY);  Surgeon: Alex Christian MD;  Location: [facility];  Service: Endoscopy;  Laterality: N/A;    INJECTION OF ANESTHETIC AGENT AROUND MEDIAL BRANCH NERVES INNERVATING CERVICAL FACET JOINT Right 6/7/2018    Procedure: BLOCK, NERVE, FACET JOINT, MEDIAL BRANCH, CERVICAL;  Surgeon: Galo Clancy MD;  Location: [facility] OR;  Service: Pain Management;  Laterality: Right;  C4, 5, 6    OPEN REDUCTION AND INTERNAL FIXATION (ORIF) OF INJURY OF ANKLE Right 11/1/2018    Procedure: ORIF, ANKLE;  Surgeon: Wilfredo Aguero MD;  Location: [facility];  Service: Orthopedics;   Laterality: Right;    PARACENTESIS, EYE, ANTERIOR CHAMBER, WITH AQUEOUS REMOVAL Right 7/27/2022    Procedure: Anterior chamber washout, possible IOL removal;  Surgeon: Daniel Tan MD;  Location: [facility] OR;  Service: Ophthalmology;  Laterality: Right;    pyloristenosis      RADIOFREQUENCY ABLATION OF LUMBAR MEDIAL BRANCH NERVE AT SINGLE LEVEL Bilateral 9/21/2018    Procedure: RADIOFREQUENCY ABLATION, NERVE, SPINAL, LUMBAR, MEDIAL BRANCH, 1 LEVEL;  Surgeon: Galo Clancy MD;  Location: [facility] OR;  Service: Pain Management;  Laterality: Bilateral;  L3, 4, 5    RADIOFREQUENCY ABLATION OF LUMBAR MEDIAL BRANCH NERVE AT SINGLE LEVEL Bilateral 2/19/2019    Procedure: Radiofrequency Ablation, Nerve, Spinal, Lumbar, Medial Branch, 1 Level;  Surgeon: Galo Clancy MD;  Location: [facility] OR;  Service: Pain Management;  Laterality: Bilateral;  L3, 4, 5     RADIOFREQUENCY ABLATION OF LUMBAR MEDIAL BRANCH NERVE AT SINGLE LEVEL Bilateral 3/10/2020    Procedure: Radiofrequency Ablation, Nerve, Spinal, Lumbar, Medial Branch, 1 Level;  Surgeon: Galo Clancy MD;  Location: [facility] OR;  Service: Pain Management;  Laterality: Bilateral;  L3,4,5 - Burned at 80 degrees C. for 60 seconds x 2 each site      RADIOFREQUENCY ABLATION OF LUMBAR MEDIAL BRANCH NERVE AT SINGLE LEVEL Bilateral 9/11/2020    Procedure: Radiofrequency Ablation, Nerve, Spinal, Lumbar, Medial Branch, 1 Level;  Surgeon: Galo Clancy MD;  Location: [facility] OR;  Service: Pain Management;  Laterality: Bilateral;  L3, 4, 5 - Burned at 80 degrees C. for 60 seconds x 2 each site    RADIOFREQUENCY ABLATION OF LUMBAR MEDIAL BRANCH NERVE AT SINGLE LEVEL Bilateral 5/28/2021    Procedure: Radiofrequency Ablation, Nerve, Spinal, Lumbar, Medial Branch, 1 Level;  Surgeon: Galo Clancy MD;  Location: [facility] OR;  Service: Pain Management;  Laterality: Bilateral;  L3,4,5    RADIOFREQUENCY THERMAL COAGULATION OF MEDIAL BRANCH OF POSTERIOR RAMUS OF CERVICAL SPINAL NERVE Right 7/3/2018    Procedure:  RADIOFREQUENCY THERMAL COAGULATION, NERVE, SPINAL, CERVICAL, MEDIAL BRANCH OF POSTERIOR RAMUS;  Surgeon: Galo Clancy MD;  Location: [facility] OR;  Service: Pain Management;  Laterality: Right;  C4,5,6 - Burned at 80 degrees C. for 75 seconds each site    RADIOFREQUENCY THERMAL COAGULATION OF MEDIAL BRANCH OF POSTERIOR RAMUS OF CERVICAL SPINAL NERVE Right 7/23/2019    Procedure: RADIOFREQUENCY THERMAL COAGULATION, NERVE, SPINAL, CERVICAL, POSTERIOR RAMUS, MEDIAL BRANCH;  Surgeon: Galo Clancy MD;  Location: [facility] OR;  Service: Pain Management;  Laterality: Right;  C4,5,6    RADIOFREQUENCY THERMAL COAGULATION OF MEDIAL BRANCH OF POSTERIOR RAMUS OF CERVICAL SPINAL NERVE Right 6/23/2020    Procedure: RADIOFREQUENCY THERMAL COAGULATION, NERVE, SPINAL, CERVICAL, POSTERIOR RAMUS, MEDIAL BRANCH;  Surgeon: Galo Clancy MD;  Location: [facility] OR;  Service: Pain Management;  Laterality: Right;  C4, 5, 6    RADIOFREQUENCY THERMOCOAGULATION Bilateral 9/10/2019    Procedure: RADIOFREQUENCY THERMAL COAGULATION LUMBAR;  Surgeon: Galo Clancy MD;  Location: [facility] OR;  Service: Pain Management;  Laterality: Bilateral;  L3,4,5 - Burned at 80 degrees C. for 60 seconds x 2 each site    skin cancer removal       TONSILLECTOMY         Review of patient's allergies indicates:   Allergen Reactions    Gabapentin Hallucinations    Xarelto [rivaroxaban] Rash    Bactrim [sulfamethoxazole-trimethoprim] Other (See Comments)     Upset stomach, dry heaves, confusion    Afrin (pseudoephedrine)     Amoxicillin-pot clavulanate      Other reaction(s): Mental Status Change    Atorvastatin      Other reaction(s): Joint pain    Baclofen     Baclofen (bulk) Nausea And Vomiting    Ciprofloxacin     Decongest tabs      Other reaction(s): increased heart rate    Decongestant [pseudoephedrine hcl]     Erythromycin Other (See Comments)    Flecainide Hives     And SOB. No reaction to Lidocaine     Fluoxetine      Other reaction(s): heart  palpitations  Other reaction(s): anxiety    Lisinopril Other (See Comments)     cough    Losartan Other (See Comments)     Hypotension with lightheadedness    Morphine Other (See Comments)     confusion    Tramadol Other (See Comments)     SOB, low BP    Venlafaxine     Venlafaxine analogues      Changes in BP and increases heart rate       Afrin [oxymetazoline] Palpitations    Caffeine Palpitations    Dabigatran etexilate Rash    Tizanidine Anxiety     dizziness       Current Facility-Administered Medications on File Prior to Encounter   Medication    bupivacaine (PF) 0.25% (2.5 mg/ml) injection    lidocaine (PF) 10 mg/ml (1%) injection    lidocaine (PF) 20 mg/ml (2%) injection    methylPREDNISolone acetate injection     Current Outpatient Medications on File Prior to Encounter   Medication Sig    apixaban (ELIQUIS) 5 mg Tab Take 1 tablet (5 mg total) by mouth 2 (two) times daily.    aspirin 81 MG Chew TAKE 1 TABLET BY MOUTH EVERY DAY    atropine 1% (ISOPTO ATROPINE) 1 % Drop Place 1 drop into the right eye 2 (two) times a day.    busPIRone (BUSPAR) 10 MG tablet Take 1 tablet (10 mg total) by mouth 3 (three) times daily.    dorzolamide-timolol 2-0.5% (COSOPT) 22.3-6.8 mg/mL ophthalmic solution Place 1 drop into both eyes 3 (three) times daily.    LORazepam (ATIVAN) 0.5 MG tablet Take 1 tablet (0.5 mg total) by mouth 2 (two) times daily as needed for Anxiety.    metoprolol tartrate (LOPRESSOR) 25 MG tablet TAKE 1 TABLET BY MOUTH TWICE A DAY    pantoprazole (PROTONIX) 40 MG tablet Take 1 tablet (40 mg total) by mouth once daily.    vortioxetine (TRINTELLIX) 20 mg Tab Take 1 tablet (20 mg total) by mouth Daily.    [DISCONTINUED] brimonidine 0.2% (ALPHAGAN) 0.2 % Drop Place 1 drop into the right eye 3 (three) times daily.     Family History       Problem Relation (Age of Onset)    Alzheimer's disease Maternal Uncle    Arthritis Mother    Asthma Mother    Cancer Maternal Grandfather, Paternal  Grandmother    Depression Son    Emphysema Maternal Grandfather    Heart disease Father    Kidney disease Maternal Grandfather    Pneumonia Mother, Paternal Grandfather    Rheum arthritis Mother, Maternal Grandmother    Skin cancer Mother    Ulcers Father          Tobacco Use    Smoking status: Never    Smokeless tobacco: Never   Substance and Sexual Activity    Alcohol use: No    Drug use: No    Sexual activity: Yes     Partners: Male     Review of Systems   Constitutional:  Positive for appetite change.        Chronic weight loss   Neurological:  Positive for numbness.   All other systems reviewed and are negative.    Objective:     Vital Signs (Most Recent):  Temp: 97.3 °F (36.3 °C) (10/25/23 1500)  Pulse: 79 (10/25/23 1500)  Resp: 17 (10/25/23 1500)  BP: (!) 144/67 (10/25/23 1500)  SpO2: 100 % (10/25/23 1500) Vital Signs (24h Range):  Temp:  [97.3 °F (36.3 °C)-98.4 °F (36.9 °C)] 97.3 °F (36.3 °C)  Pulse:  [65-84] 79  Resp:  [17-20] 17  SpO2:  [99 %-100 %] 100 %  BP: (134-162)/(63-72) 144/67     Weight: 47.3 kg (104 lb 4.4 oz)  Body mass index is 16.33 kg/m².     Physical Exam  Constitutional:       Appearance: She is well-developed.   HENT:      Head: Normocephalic and atraumatic.   Eyes:      Conjunctiva/sclera: Conjunctivae normal.      Pupils: Pupils are equal, round, and reactive to light.   Neck:      Thyroid: No thyromegaly.      Vascular: No JVD.   Cardiovascular:      Rate and Rhythm: Normal rate and regular rhythm.      Heart sounds: No murmur heard.     No friction rub. No gallop.   Pulmonary:      Effort: Pulmonary effort is normal.      Breath sounds: Normal breath sounds.   Abdominal:      General: Bowel sounds are normal. There is no distension.      Palpations: Abdomen is soft. There is no mass.      Tenderness: There is no abdominal tenderness.   Musculoskeletal:         General: Normal range of motion.      Cervical back: Neck supple.   Skin:     General: Skin is warm and dry.  "  Neurological:      Mental Status: She is oriented to person, place, and time.      Cranial Nerves: No cranial nerve deficit.   Psychiatric:         Behavior: Behavior normal.              CRANIAL NERVES     CN III, IV, VI   Pupils are equal, round, and reactive to light.       Significant Labs: All pertinent labs within the past 24 hours have been reviewed.  CBC:   Recent Labs   Lab 10/25/23  1020   WBC 11.14   HGB 14.1   HCT 43.0        CMP:   Recent Labs   Lab 10/25/23  1020      K 3.7      CO2 23   GLU 97   BUN 17   CREATININE 0.8   CALCIUM 9.4   PROT 6.9   ALBUMIN 3.8   BILITOT 1.1*   ALKPHOS 68   AST 21   ALT 13   ANIONGAP 10     Lipid Panel:   Recent Labs   Lab 10/25/23  1535   CHOL 218*   HDL 74   LDLCALC 129.0   TRIG 75   CHOLHDL 33.9     Troponin:   Recent Labs   Lab 10/25/23  1020   TROPONINI <0.006     TSH:   Recent Labs   Lab 10/25/23  1020   TSH 1.078     Urine Studies: No results for input(s): "COLORU", "APPEARANCEUA", "PHUR", "SPECGRAV", "PROTEINUA", "GLUCUA", "KETONESU", "BILIRUBINUA", "OCCULTUA", "NITRITE", "UROBILINOGEN", "LEUKOCYTESUR", "RBCUA", "WBCUA", "BACTERIA", "SQUAMEPITHEL", "HYALINECASTS" in the last 48 hours.    Invalid input(s): "WRIGHTSUR"    Significant Imaging:   CXR:Pacemaker in place otherwise negative chest x-ray.    CT Head:  1. There is no acute intracranial abnormality.  There is nonspecific white matter change but there is no hemorrhage, mass or obvious acute infarction.  It should be noted that MRI is more sensitive in the detection of subtle or acute nonhemorrhagic ischemic disease.  2. Right sphenoid sinusitis.    CTA head and neck: Pending.     Assessment/Plan:     * TIA (transient ischemic attack), 11/3/2014    Antithrombotics for secondary stroke prevention: Antiplatelets: Aspirin: 81 mg daily    Statins for secondary stroke prevention and hyperlipidemia, if present:    Patient has Statin Intolerance    Aggressive risk factor modification: HTN   "   Rehab efforts: The patient has been evaluated by a stroke team provider and the therapy needs have been fully considered based off the presenting complaints and exam findings. The following therapy evaluations are needed: PT evaluate and treat, OT evaluate and treat, SLP evaluate and treat    Diagnostics ordered/pending: CTA Head to assess vasculature , HgbA1C to assess blood glucose levels, Lipid Profile to assess cholesterol levels    VTE prophylaxis: Enoxaparin 40 mg SQ every 24 hours    BP parameters: TIA: SBP <220 until imaging confirmation of no infarct         Benzodiazepine dependence, Ativan 0.5 mg daily since 1/2023  Noted, continue home regimen.       Cardiac pacemaker in situ, [employer], dual chamber, 10/14/2021  Noted, continue telemonitoring.       JOSE (generalized anxiety disorder)  Continue home Benzodiazepine.      H/O: CVA (cerebrovascular accident), June 2014  Noted      GERD (gastroesophageal reflux disease)  Continue PPI.      Paroxysmal atrial fibrillation, ablations X 3 1995, 1997, 9/2017, CHADS-VAS score 5, HAS-BLED score 5   Telemonitoring.  Continue Eliquis and Metoprolol.      VTE Risk Mitigation (From admission, onward)         Ordered     apixaban tablet 5 mg  2 times daily         10/25/23 1506     IP VTE HIGH RISK PATIENT  Once         10/25/23 1506     Place sequential compression device  Until discontinued         10/25/23 1506                   On 10/25/2023, patient should be placed in hospital observation services under my care.            Dale Deal MD  Department of Hospital Medicine  [facility]/Surg

## 2023-10-26 ENCOUNTER — TELEPHONE (OUTPATIENT)
Dept: ELECTROPHYSIOLOGY | Facility: CLINIC | Age: 78
End: 2023-10-26
Payer: MEDICARE

## 2023-10-26 VITALS
BODY MASS INDEX: 16.36 KG/M2 | SYSTOLIC BLOOD PRESSURE: 176 MMHG | OXYGEN SATURATION: 100 % | HEIGHT: 67 IN | DIASTOLIC BLOOD PRESSURE: 77 MMHG | TEMPERATURE: 98 F | HEART RATE: 67 BPM | RESPIRATION RATE: 16 BRPM | WEIGHT: 104.25 LBS

## 2023-10-26 LAB
ALBUMIN SERPL BCP-MCNC: 3.9 G/DL (ref 3.5–5.2)
ALP SERPL-CCNC: 72 U/L (ref 55–135)
ALT SERPL W/O P-5'-P-CCNC: 16 U/L (ref 10–44)
ANION GAP SERPL CALC-SCNC: 11 MMOL/L (ref 8–16)
APTT PPP: 29.7 SEC (ref 21–32)
AST SERPL-CCNC: 23 U/L (ref 10–40)
BASOPHILS # BLD AUTO: 0.04 K/UL (ref 0–0.2)
BASOPHILS NFR BLD: 0.6 % (ref 0–1.9)
BILIRUB SERPL-MCNC: 1.8 MG/DL (ref 0.1–1)
BUN SERPL-MCNC: 13 MG/DL (ref 8–23)
CALCIUM SERPL-MCNC: 9.2 MG/DL (ref 8.7–10.5)
CHLORIDE SERPL-SCNC: 106 MMOL/L (ref 95–110)
CO2 SERPL-SCNC: 19 MMOL/L (ref 23–29)
CREAT SERPL-MCNC: 0.8 MG/DL (ref 0.5–1.4)
DIFFERENTIAL METHOD: ABNORMAL
EOSINOPHIL # BLD AUTO: 0.3 K/UL (ref 0–0.5)
EOSINOPHIL NFR BLD: 3.7 % (ref 0–8)
ERYTHROCYTE [DISTWIDTH] IN BLOOD BY AUTOMATED COUNT: 13.6 % (ref 11.5–14.5)
EST. GFR  (NO RACE VARIABLE): >60 ML/MIN/1.73 M^2
FOLATE SERPL-MCNC: 19.9 NG/ML (ref 4–24)
GLUCOSE SERPL-MCNC: 88 MG/DL (ref 70–110)
HCT VFR BLD AUTO: 45.4 % (ref 37–48.5)
HGB BLD-MCNC: 14.4 G/DL (ref 12–16)
IMM GRANULOCYTES # BLD AUTO: 0.04 K/UL (ref 0–0.04)
IMM GRANULOCYTES NFR BLD AUTO: 0.6 % (ref 0–0.5)
INR PPP: 1.1 (ref 0.8–1.2)
LYMPHOCYTES # BLD AUTO: 1.2 K/UL (ref 1–4.8)
LYMPHOCYTES NFR BLD: 18 % (ref 18–48)
MAGNESIUM SERPL-MCNC: 2.1 MG/DL (ref 1.6–2.6)
MCH RBC QN AUTO: 30.6 PG (ref 27–31)
MCHC RBC AUTO-ENTMCNC: 31.7 G/DL (ref 32–36)
MCV RBC AUTO: 96 FL (ref 82–98)
MONOCYTES # BLD AUTO: 0.8 K/UL (ref 0.3–1)
MONOCYTES NFR BLD: 11.6 % (ref 4–15)
NEUTROPHILS # BLD AUTO: 4.5 K/UL (ref 1.8–7.7)
NEUTROPHILS NFR BLD: 65.5 % (ref 38–73)
NRBC BLD-RTO: 0 /100 WBC
PHOSPHATE SERPL-MCNC: 3.2 MG/DL (ref 2.7–4.5)
PLATELET # BLD AUTO: 300 K/UL (ref 150–450)
PMV BLD AUTO: 9.1 FL (ref 9.2–12.9)
POTASSIUM SERPL-SCNC: 3.5 MMOL/L (ref 3.5–5.1)
PROT SERPL-MCNC: 7 G/DL (ref 6–8.4)
PROTHROMBIN TIME: 11.4 SEC (ref 9–12.5)
RBC # BLD AUTO: 4.71 M/UL (ref 4–5.4)
SODIUM SERPL-SCNC: 136 MMOL/L (ref 136–145)
VIT B12 SERPL-MCNC: >2000 PG/ML (ref 210–950)
WBC # BLD AUTO: 6.83 K/UL (ref 3.9–12.7)

## 2023-10-26 PROCEDURE — 97165 OT EVAL LOW COMPLEX 30 MIN: CPT

## 2023-10-26 PROCEDURE — 85025 COMPLETE CBC W/AUTO DIFF WBC: CPT | Performed by: INTERNAL MEDICINE

## 2023-10-26 PROCEDURE — G0378 HOSPITAL OBSERVATION PER HR: HCPCS

## 2023-10-26 PROCEDURE — 84100 ASSAY OF PHOSPHORUS: CPT | Performed by: INTERNAL MEDICINE

## 2023-10-26 PROCEDURE — 92523 SPEECH SOUND LANG COMPREHEN: CPT

## 2023-10-26 PROCEDURE — 82607 VITAMIN B-12: CPT | Performed by: INTERNAL MEDICINE

## 2023-10-26 PROCEDURE — 94761 N-INVAS EAR/PLS OXIMETRY MLT: CPT

## 2023-10-26 PROCEDURE — 25000003 PHARM REV CODE 250: Performed by: INTERNAL MEDICINE

## 2023-10-26 PROCEDURE — 82746 ASSAY OF FOLIC ACID SERUM: CPT | Performed by: INTERNAL MEDICINE

## 2023-10-26 PROCEDURE — 85610 PROTHROMBIN TIME: CPT | Performed by: INTERNAL MEDICINE

## 2023-10-26 PROCEDURE — 80053 COMPREHEN METABOLIC PANEL: CPT | Performed by: INTERNAL MEDICINE

## 2023-10-26 PROCEDURE — 83735 ASSAY OF MAGNESIUM: CPT | Performed by: INTERNAL MEDICINE

## 2023-10-26 PROCEDURE — 85730 THROMBOPLASTIN TIME PARTIAL: CPT | Performed by: INTERNAL MEDICINE

## 2023-10-26 PROCEDURE — 92610 EVALUATE SWALLOWING FUNCTION: CPT

## 2023-10-26 PROCEDURE — 36415 COLL VENOUS BLD VENIPUNCTURE: CPT | Performed by: INTERNAL MEDICINE

## 2023-10-26 PROCEDURE — 97161 PT EVAL LOW COMPLEX 20 MIN: CPT

## 2023-10-26 RX ORDER — CLOPIDOGREL BISULFATE 75 MG/1
75 TABLET ORAL DAILY
Status: DISCONTINUED | OUTPATIENT
Start: 2023-10-27 | End: 2023-10-26 | Stop reason: HOSPADM

## 2023-10-26 RX ADMIN — SODIUM CHLORIDE: 9 INJECTION, SOLUTION INTRAVENOUS at 04:10

## 2023-10-26 RX ADMIN — PANTOPRAZOLE SODIUM 40 MG: 40 TABLET, DELAYED RELEASE ORAL at 08:10

## 2023-10-26 RX ADMIN — DORZOLAMIDE HYDROCHLORIDE AND TIMOLOL MALEATE 1 DROP: 20; 5 SOLUTION/ DROPS OPHTHALMIC at 02:10

## 2023-10-26 RX ADMIN — APIXABAN 5 MG: 2.5 TABLET, FILM COATED ORAL at 08:10

## 2023-10-26 RX ADMIN — METOPROLOL TARTRATE 25 MG: 25 TABLET, FILM COATED ORAL at 08:10

## 2023-10-26 RX ADMIN — BUSPIRONE HYDROCHLORIDE 10 MG: 10 TABLET ORAL at 02:10

## 2023-10-26 RX ADMIN — ASPIRIN 81 MG CHEWABLE TABLET 81 MG: 81 TABLET CHEWABLE at 08:10

## 2023-10-26 RX ADMIN — DORZOLAMIDE HYDROCHLORIDE AND TIMOLOL MALEATE 1 DROP: 20; 5 SOLUTION/ DROPS OPHTHALMIC at 09:10

## 2023-10-26 RX ADMIN — BUSPIRONE HYDROCHLORIDE 10 MG: 10 TABLET ORAL at 08:10

## 2023-10-26 NOTE — PT/OT/SLP EVAL
Physical Therapy Evaluation    Patient Name:  Ayla Dela Cruz   MRN:  [id number]    Recommendations:     Discharge Recommendations: No Therapy Indicated (home with supervision)   Discharge Equipment Recommendations: none   Barriers to discharge: None    Assessment:     Ayla Dela Cruz is a 78 y.o. female admitted with a medical diagnosis of TIA (transient ischemic attack).  She presents with the following impairments/functional limitations: weakness, impaired endurance, gait instability .    Pt seen supine in bed, alert and agreeable to PT. Pt alert and moving all extremities well. Pt stated that L facial and tongue weakness and numbness are all resolved and is feeling well now. Pt ambulated at hallways 250ft x2  and OOB chair.  Pt to benefit from supervision at discharge.    Rehab Prognosis: Fair; patient would benefit from acute skilled PT services to address these deficits and reach maximum level of function.    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  Equipment used at home: none.  DME owned (not currently used): rolling walker.  Upon discharge, patient will have assistance from family.    Objective:     Communicated with nurse Fernandez  prior to session.  Patient found HOB elevated with telemetry  upon PT entry to room.    General Precautions: Standard, fall  Orthopedic Precautions:N/A   Braces: N/A  Respiratory Status: Room air    Exams:  Postural Exam:  Patient presented with the following abnormalities:    -       Rounded shoulders  -       Forward head  -       BMI 16  RLE ROM: WFL  RLE Strength: WFL  LLE ROM: WFL  LLE Strength: WFL    Functional Mobility:  Bed Mobility:     Rolling Left:  supervision  Scooting: supervision  Supine to Sit: contact guard assistance  Transfers:     Sit to Stand:  contact guard assistance with no AD  Bed to Chair: contact guard assistance with  no AD  using  Stand Pivot  Gait: 250ft x2 with min /CGA      AM-PAC 6 CLICK MOBILITY  Total Score:17       Treatment & Education:  Patient was educated on the importance of OOB activity and functional mobility to negate negative effects of prolonged bed rest during hospitalization, safe transfers and ambulation, and D/C planning   OOB chair post PT with all needs within reach    Patient left up in chair with all lines intact, call button in reach, chair alarm on, and spouse present.    GOALS:   Multidisciplinary Problems       Physical Therapy Goals          Problem: Physical Therapy    Goal Priority Disciplines Outcome Goal Variances Interventions   Physical Therapy Goal     PT, PT/OT Ongoing, Progressing     Description: Goals to be met by: 10-     Patient will increase functional independence with mobility by performin. Supine to sit with [place]  2. Sit to stand transfer with Supervision  3. Bed to chair transfer with Supervision using No Assistive Device  4. Gait  x 250x2 feet with Stand-by Assistance using No Assistive Device.   5. Lower extremity exercise program x20 reps                       History:     Past Medical History:   Diagnosis Date    Anticoagulant long-term use     Anxiety     Arthritis     Atrial fibrillation     Basal cell carcinoma      Cancer     skin    CHF (congestive heart failure)     Depression     DVT (deep venous thrombosis)     GERD (gastroesophageal reflux disease)     Glaucoma (increased eye pressure)     Hyperlipidemia     diet controlled    Hypertension     Interstitial lung disease     Localized hives 01/10/2020    Mild persistent asthma without complication 11/12/2018    Pacemaker     Pneumonia 01/31/2014    Stroke 06/03/2014    Stroke     TIA (transient ischemic attack)     TIA (transient ischemic attack)        Past Surgical History:   Procedure Laterality Date    2 heart ablations      3 in total    A-V CARDIAC PACEMAKER INSERTION Left 10/14/2021    Procedure: INSERTION, CARDIAC PACEMAKER, DUAL CHAMBER;  Surgeon: cFo Calderon MD;  Location: [facility] EP LAB;  Service: Cardiology;  Laterality: Left;  PAF/ Hecker Arrthymias, Dual PPM, SJM, MAC, GP, 3 PREP    ANTERIOR VITRECTOMY Right 7/27/2022    Procedure: VITRECTOMY, ANTERIOR APPROACH;  Surgeon: Daniel Tan MD;  Location: [facility] OR;  Service: Ophthalmology;  Laterality: Right;    bilateral cataracts      CHOLECYSTECTOMY      COLONOSCOPY N/A 1/19/2017    Procedure: COLONOSCOPY and EGD;  Surgeon: Jonathan Schultz MD;  Location: [facility] ENDO;  Service: Endoscopy;  Laterality: N/A;    COLONOSCOPY N/A 10/10/2019    Procedure: COLONOSCOPY;  Surgeon: Alex Christian MD;  Location: [facility] ENDO;  Service: Endoscopy;  Laterality: N/A;    ESOPHAGOGASTRODUODENOSCOPY N/A 10/14/2019    Procedure: EGD (ESOPHAGOGASTRODUODENOSCOPY);  Surgeon: Alex Christian MD;  Location: [facility] ENDO;  Service: Endoscopy;  Laterality: N/A;    INJECTION OF ANESTHETIC AGENT AROUND MEDIAL BRANCH NERVES INNERVATING CERVICAL FACET JOINT Right 6/7/2018    Procedure: BLOCK, NERVE, FACET JOINT, MEDIAL BRANCH, CERVICAL;  Surgeon: Galo Clancy MD;  Location: [facility] OR;  Service: Pain Management;  Laterality: Right;  C4, 5, 6    OPEN REDUCTION AND INTERNAL FIXATION (ORIF) OF INJURY OF ANKLE Right 11/1/2018    Procedure: ORIF,  ANKLE;  Surgeon: Wilfredo Aguero MD;  Location: [facility] OR;  Service: Orthopedics;  Laterality: Right;    PARACENTESIS, EYE, ANTERIOR CHAMBER, WITH AQUEOUS REMOVAL Right 7/27/2022    Procedure: Anterior chamber washout, possible IOL removal;  Surgeon: Daniel Tan MD;  Location: [facility] OR;  Service: Ophthalmology;  Laterality: Right;    pyloristenosis      RADIOFREQUENCY ABLATION OF LUMBAR MEDIAL BRANCH NERVE AT SINGLE LEVEL Bilateral 9/21/2018    Procedure: RADIOFREQUENCY ABLATION, NERVE, SPINAL, LUMBAR, MEDIAL BRANCH, 1 LEVEL;  Surgeon: Galo Clancy MD;  Location: [facility] OR;  Service: Pain Management;  Laterality: Bilateral;  L3, 4, 5    RADIOFREQUENCY ABLATION OF LUMBAR MEDIAL BRANCH NERVE AT SINGLE LEVEL Bilateral 2/19/2019    Procedure: Radiofrequency Ablation, Nerve, Spinal, Lumbar, Medial Branch, 1 Level;  Surgeon: Galo Clancy MD;  Location: [facility] OR;  Service: Pain Management;  Laterality: Bilateral;  L3, 4, 5     RADIOFREQUENCY ABLATION OF LUMBAR MEDIAL BRANCH NERVE AT SINGLE LEVEL Bilateral 3/10/2020    Procedure: Radiofrequency Ablation, Nerve, Spinal, Lumbar, Medial Branch, 1 Level;  Surgeon: Galo Clancy MD;  Location: [facility];  Service: Pain Management;  Laterality: Bilateral;  L3,4,5 - Burned at 80 degrees C. for 60 seconds x 2 each site      RADIOFREQUENCY ABLATION OF LUMBAR MEDIAL BRANCH NERVE AT SINGLE LEVEL Bilateral 9/11/2020    Procedure: Radiofrequency Ablation, Nerve, Spinal, Lumbar, Medial Branch, 1 Level;  Surgeon: Galo Clancy MD;  Location: [facility] OR;  Service: Pain Management;  Laterality: Bilateral;  L3, 4, 5 - Burned at 80 degrees C. for 60 seconds x 2 each site    RADIOFREQUENCY ABLATION OF LUMBAR MEDIAL BRANCH NERVE AT SINGLE LEVEL Bilateral 5/28/2021    Procedure: Radiofrequency Ablation, Nerve, Spinal, Lumbar, Medial Branch, 1 Level;  Surgeon: Galo Clancy MD;  Location: [facility] OR;  Service: Pain Management;  Laterality: Bilateral;  L3,4,5    RADIOFREQUENCY THERMAL COAGULATION OF MEDIAL BRANCH  OF POSTERIOR RAMUS OF CERVICAL SPINAL NERVE Right 7/3/2018    Procedure: RADIOFREQUENCY THERMAL COAGULATION, NERVE, SPINAL, CERVICAL, MEDIAL BRANCH OF POSTERIOR RAMUS;  Surgeon: Galo Clancy MD;  [facility]  Service: Pain Management;  Laterality: Right;  C4,5,6 - Burned at 80 degrees C. for 75 seconds each site    RADIOFREQUENCY THERMAL COAGULATION OF MEDIAL BRANCH OF POSTERIOR RAMUS OF CERVICAL SPINAL NERVE Right 7/23/2019    Procedure: RADIOFREQUENCY THERMAL COAGULATION, NERVE, SPINAL, CERVICAL, POSTERIOR RAMUS, MEDIAL BRANCH;  Surgeon: Galo Clancy MD;  [facility];  Service: Pain Management;  Laterality: Right;  C4,5,6    RADIOFREQUENCY THERMAL COAGULATION OF MEDIAL BRANCH OF POSTERIOR RAMUS OF CERVICAL SPINAL NERVE Right 6/23/2020    Procedure: RADIOFREQUENCY THERMAL COAGULATION, NERVE, SPINAL, CERVICAL, POSTERIOR RAMUS, MEDIAL BRANCH;  Surgeon: Galo Clancy MD;  [facility];  Service: Pain Management;  Laterality: Right;  C4, 5, 6    RADIOFREQUENCY THERMOCOAGULATION Bilateral 9/10/2019    Procedure: RADIOFREQUENCY THERMAL COAGULATION LUMBAR;  Surgeon: Galo Clancy MD;  [facility];  Service: Pain Management;  Laterality: Bilateral;  L3,4,5 - Burned at 80 degrees C. for 60 seconds x 2 each site    skin cancer removal       TONSILLECTOMY         Time Tracking:     PT Received On: 10/26/23  PT Start Time: 1118     PT Stop Time: 1133  PT Total Time (min): 15 min     Billable Minutes: Evaluation 15      10/26/2023

## 2023-10-26 NOTE — PLAN OF CARE
[facility] - Med/Surg  Initial Discharge Assessment       Primary Care Provider: Jan Olivo MD    Admission Diagnosis: TIA (transient ischemic attack) [G45.9]    Admission Date: 10/25/2023  Expected Discharge Date: 10/26/2023    Transition of Care Barriers: None    Payor: [employer]ARE ADVANTAGE / Plan: [employer] MCARE ADV / Product Type: Medicare Advantage /     Extended Emergency Contact Information  Primary Emergency Contact: Jan Dela Cruz  Address: [address] [place] [address] [place]  Mobile Phone: [phone]  Relation: Spouse  Preferred language: English   needed? No  Secondary Emergency Contact: Lyric Olguin  [address] [place] [address] [facility]  Home Phone: [phone]  Mobile Phone: [phone]  Relation: Daughter  Preferred language: English   needed? No    Discharge Plan A: Home  Discharge Plan B: Home with family      [id number] - [place], [place] - [address]  [address] [facility] [place] [address]  Phone: [phone] Fax: [phone]    SW met with patient and patient's spouse Jan Dela Cruz at bedside to complete discharge planning assessment.  Patient leaving for test.  Spouse verified all demographic information on facesheet is correct.  Spouse verified PCP is Dr. Olivo.  Spouse verified primary health insurance is [employer].  Patient with NO home health but has listed DME.  Patient with POA(spouse)  and Living Will.  Patient not on dialysis or medication coumadin.  Patient with no 30 day admission.  Patient with no financial issues at this time.  Patient family will provide transportation upon discharge from facility.  Patient independent with ADLs, live with spouse, drives self or spouse drives.      Initial Assessment (most recent)       Adult Discharge Assessment - 10/26/23 0007          Discharge Assessment    Assessment Type  Discharge Planning Assessment     Confirmed/corrected address, phone number and insurance Yes     Confirmed Demographics Correct on Facesheet     Source of Information family     Does patient/caregiver understand observation status Yes     Communicated ERWIN with patient/caregiver Yes     People in Home spouse     Facility Arrived From: home     Do you expect to return to your current living situation? Yes     Do you have help at home or someone to help you manage your care at home? Yes     Who are your caregiver(s) and their phone number(s)? spouse     Prior to hospitilization cognitive status: Alert/Oriented     Current cognitive status: Alert/Oriented     Equipment Currently Used at Home CPAP     Readmission within 30 days? No     Patient currently being followed by outpatient case management? No     Do you currently have service(s) that help you manage your care at home? No     Do you take prescription medications? Yes     Do you have prescription coverage? Yes     Do you have any problems affording any of your prescribed medications? No     Is the patient taking medications as prescribed? yes     Who is going to help you get home at discharge? spouse     How do you get to doctors appointments? family or friend will provide;car, drives self     Are you on dialysis? No     Do you take coumadin? No   Eliquis    DME Needed Upon Discharge  none     Discharge Plan discussed with: Spouse/sig other     Transition of Care Barriers None     Discharge Plan A Home     Discharge Plan B Home with family

## 2023-10-26 NOTE — PLAN OF CARE
10/26/23 1400   BLACKBURN Message   Medicare Outpatient and Observation Notification regarding financial responsibility Explained to patient/caregiver;Signed/date by patient/caregiver   Date BLACKBURN was signed 10/26/23   Time BLACKBURN was signed 1400

## 2023-10-26 NOTE — NURSING
Pt's  approached this nurse and reported he reached out to the pt's doctor and was told the pt's pacemaker is MRI compatible.

## 2023-10-26 NOTE — PLAN OF CARE
SW attempted to meet with patient at bedside to complete discharge planning assessment without success.  ST evaluating patient at this time.  CM will follow up.       10/26/23 6136   Discharge Assessment   Assessment Type Discharge Planning Assessment

## 2023-10-26 NOTE — PT/OT/SLP EVAL
Occupational Therapy   Evaluation and Discharge Note    Name: Ayla Dela Cruz  MRN: [id number]  Admitting Diagnosis: TIA (transient ischemic attack)  Recent Surgery: * No surgery found *    Recommendations:     Discharge Recommendations: home  Discharge Equipment Recommendations: None; Pt owns a RW, shower chair and a bedside commode.   Barriers to discharge:  None    Assessment:     Ayla Dela Cruz is a 78 y.o. female with a medical diagnosis of TIA (transient ischemic attack). At this time, patient is functioning at their prior level of function and does not require further acute OT services.     Plan:     During this hospitalization, patient does not require further acute OT services.  Please re-consult if situation changes.      Subjective     Chief Complaint: Fatigue  Patient/Family Comments/goals: To go home    Occupational Profile:  Living Environment: Pt lives with her .  Previous level of function: Independent  Equipment Used at home: None; Pt owns a RW, shower chair and a bedside commode.   Assistance upon Discharge: Pt will have assistance from her .     Pain/Comfort:  Pain Rating 1: 0/10    Patients cultural, spiritual, Uatsdin conflicts given the current situation: yes    Objective:     Communicated with: nurse prior to session.  Patient found HOB elevated upon OT entry to room.    General Precautions: Standard, fall  Orthopedic Precautions: N/A  Braces: N/A  Respiratory Status: Room air     Occupational Performance:    Bed Mobility:    Patient completed Scooting/Bridging with independence  Patient completed Supine to Sit with independence  Patient completed Sit to Supine with independence    Functional Mobility/Transfers:  Patient completed Sit <> Stand Transfer with stand by assistance with no assistive device   Patient completed Toilet Transfer Step Transfer technique with stand by assistance with no AD  Functional Mobility: bed <> bathroom with SBA with no AD without loss  of balance    Activities of Daily Living:  Feeding:  independence  Grooming: modified independence  Upper Body Dressing: modified independence  Lower Body Dressing: stand by assistance  Toileting: modified independence    Cognitive/Visual Perceptual:  Cognitive/Psychosocial Skills:  -       Oriented to: Person, Place, Time, and Situation   -       Follows Commands/attention: Follows multistep commands  -       Communication: clear/fluent    Physical Exam:  Upper Extremity Range of Motion:  -       Right Upper Extremity: WFL  -       Left Upper Extremity: WFL  Upper Extremity Strength: -       Right Upper Extremity: WFL  -       Left Upper Extremity: WFL    AMPAC 6 Click ADL:  AMPAC Total Score: 24    Treatment & Education:  Patient was given education on role of OT.     Patient left HOB elevated with all lines intact, call button in reach and patient's  present.    GOALS:   Multidisciplinary Problems       Occupational Therapy Goals       Not on file                    History:     Past Medical History:   Diagnosis Date    Anticoagulant long-term use     Anxiety     Arthritis     Atrial fibrillation     Basal cell carcinoma     Cancer     skin    CHF (congestive heart failure)     Depression     DVT (deep venous thrombosis)     GERD (gastroesophageal reflux disease)     Glaucoma (increased eye pressure)     Hyperlipidemia     diet controlled    Hypertension     Interstitial lung disease     Localized hives 01/10/2020    Mild persistent asthma without complication 11/12/2018    Pacemaker     Pneumonia 01/31/2014    Stroke 06/03/2014    Stroke     TIA (transient ischemic attack)     TIA (transient ischemic attack)          Past Surgical History:   Procedure Laterality Date    2 heart ablations      3 in total    A-V CARDIAC PACEMAKER INSERTION Left 10/14/2021    Procedure: INSERTION, CARDIAC PACEMAKER, DUAL CHAMBER;  Surgeon: Fco Calderon MD;  Location: [facility] EP LAB;  Service: Cardiology;  Laterality: Left;   PAF/ Pierz Arrthymias, Dual PPM, SJM, MAC, GP, 3 PREP    ANTERIOR VITRECTOMY Right 7/27/2022    Procedure: VITRECTOMY, ANTERIOR APPROACH;  Surgeon: Daniel Tan MD;  Location: [facility] OR;  Service: Ophthalmology;  Laterality: Right;    bilateral cataracts      CHOLECYSTECTOMY      COLONOSCOPY N/A 1/19/2017    Procedure: COLONOSCOPY and EGD;  Surgeon: Jonathan Schultz MD;  Location: [facility] ENDO;  Service: Endoscopy;  Laterality: N/A;    COLONOSCOPY N/A 10/10/2019    Procedure: COLONOSCOPY;  Surgeon: Alex Christian MD;  Location: [facility] ENDO;  Service: Endoscopy;  Laterality: N/A;    ESOPHAGOGASTRODUODENOSCOPY N/A 10/14/2019    Procedure: EGD (ESOPHAGOGASTRODUODENOSCOPY);  Surgeon: Alex Christian MD;  Location: [facility] ENDO;  Service: Endoscopy;  Laterality: N/A;    INJECTION OF ANESTHETIC AGENT AROUND MEDIAL BRANCH NERVES INNERVATING CERVICAL FACET JOINT Right 6/7/2018    Procedure: BLOCK, NERVE, FACET JOINT, MEDIAL BRANCH, CERVICAL;  Surgeon: Galo Clancy MD;  Location: [facility] OR;  Service: Pain Management;  Laterality: Right;  C4, 5, 6    OPEN REDUCTION AND INTERNAL FIXATION (ORIF) OF INJURY OF ANKLE Right 11/1/2018    Procedure: ORIF, ANKLE;  Surgeon: Wilfredo Aguero MD;  Location: [facility];  Service: Orthopedics;  Laterality: Right;    PARACENTESIS, EYE, ANTERIOR CHAMBER, WITH AQUEOUS REMOVAL Right 7/27/2022    Procedure: Anterior chamber washout, possible IOL removal;  Surgeon: Daniel Tan MD;  Location: [facility] OR;  Service: Ophthalmology;  Laterality: Right;    pyloristenosis      RADIOFREQUENCY ABLATION OF LUMBAR MEDIAL BRANCH NERVE AT SINGLE LEVEL Bilateral 9/21/2018    Procedure: RADIOFREQUENCY ABLATION, NERVE, SPINAL, LUMBAR, MEDIAL BRANCH, 1 LEVEL;  Surgeon: Galo Clancy MD;  Location: [facility] OR;  Service: Pain Management;  Laterality: Bilateral;  L3, 4, 5    RADIOFREQUENCY ABLATION OF LUMBAR MEDIAL BRANCH NERVE AT SINGLE LEVEL Bilateral 2/19/2019    Procedure: Radiofrequency Ablation, Nerve, Spinal, Lumbar,  Medial Branch, 1 Level;  Surgeon: Galo Clancy MD;  Location: [facility] OR;  Service: Pain Management;  Laterality: Bilateral;  L3, 4, 5     RADIOFREQUENCY ABLATION OF LUMBAR MEDIAL BRANCH NERVE AT SINGLE LEVEL Bilateral 3/10/2020    Procedure: Radiofrequency Ablation, Nerve, Spinal, Lumbar, Medial Branch, 1 Level;  Surgeon: Galo Clancy MD;  Location: [facility] OR;  Service: Pain Management;  Laterality: Bilateral;  L3,4,5 - Burned at 80 degrees C. for 60 seconds x 2 each site      RADIOFREQUENCY ABLATION OF LUMBAR MEDIAL BRANCH NERVE AT SINGLE LEVEL Bilateral 9/11/2020    Procedure: Radiofrequency Ablation, Nerve, Spinal, Lumbar, Medial Branch, 1 Level;  Surgeon: Galo Clancy MD;  Location: [facility] OR;  Service: Pain Management;  Laterality: Bilateral;  L3, 4, 5 - Burned at 80 degrees C. for 60 seconds x 2 each site    RADIOFREQUENCY ABLATION OF LUMBAR MEDIAL BRANCH NERVE AT SINGLE LEVEL Bilateral 5/28/2021    Procedure: Radiofrequency Ablation, Nerve, Spinal, Lumbar, Medial Branch, 1 Level;  Surgeon: Galo Clancy MD;  Location: [facility] OR;  Service: Pain Management;  Laterality: Bilateral;  L3,4,5    RADIOFREQUENCY THERMAL COAGULATION OF MEDIAL BRANCH OF POSTERIOR RAMUS OF CERVICAL SPINAL NERVE Right 7/3/2018    Procedure: RADIOFREQUENCY THERMAL COAGULATION, NERVE, SPINAL, CERVICAL, MEDIAL BRANCH OF POSTERIOR RAMUS;  Surgeon: Galo Clancy MD;  Location: [facility] OR;  Service: Pain Management;  Laterality: Right;  C4,5,6 - Burned at 80 degrees C. for 75 seconds each site    RADIOFREQUENCY THERMAL COAGULATION OF MEDIAL BRANCH OF POSTERIOR RAMUS OF CERVICAL SPINAL NERVE Right 7/23/2019    Procedure: RADIOFREQUENCY THERMAL COAGULATION, NERVE, SPINAL, CERVICAL, POSTERIOR RAMUS, MEDIAL BRANCH;  Surgeon: Galo Clancy MD;  Location: [facility] OR;  Service: Pain Management;  Laterality: Right;  C4,5,6    RADIOFREQUENCY THERMAL COAGULATION OF MEDIAL BRANCH OF POSTERIOR RAMUS OF CERVICAL SPINAL NERVE Right 6/23/2020    Procedure: RADIOFREQUENCY THERMAL  COAGULATION, NERVE, SPINAL, CERVICAL, POSTERIOR RAMUS, MEDIAL BRANCH;  Surgeon: Galo Clancy MD;  Location: [facility] OR;  Service: Pain Management;  Laterality: Right;  C4, 5, 6    RADIOFREQUENCY THERMOCOAGULATION Bilateral 9/10/2019    Procedure: RADIOFREQUENCY THERMAL COAGULATION LUMBAR;  Surgeon: Galo Clancy MD;  Location: [facility] OR;  Service: Pain Management;  Laterality: Bilateral;  L3,4,5 - Burned at 80 degrees C. for 60 seconds x 2 each site    skin cancer removal       TONSILLECTOMY         Time Tracking:     OT Date of Treatment: 10/26/23  OT Start Time: 1033  OT Stop Time: 1050  OT Total Time (min): 17 min    Billable Minutes:Evaluation 17    10/26/2023

## 2023-10-26 NOTE — TELEPHONE ENCOUNTER
----- Message from Leticia Celis sent at 10/26/2023 11:56 AM CDT -----  Regarding: Compatible device  [facility] [phone] pt spouse says she's currently in the hospital needing a MRI and they need to know if her device is compatible for test? They are waiting for this information so that the test can be done.    Thanks

## 2023-10-26 NOTE — PLAN OF CARE
Problem: Adjustment to Illness (Stroke, Ischemic/Transient Ischemic Attack)  Goal: Optimal Coping  Outcome: Ongoing, Progressing     Problem: Cerebral Tissue Perfusion (Stroke, Ischemic/Transient Ischemic Attack)  Goal: Optimal Cerebral Tissue Perfusion  Outcome: Ongoing, Progressing     Problem: Urinary Elimination Impaired (Stroke, Ischemic/Transient Ischemic Attack)  Goal: Effective Urinary Elimination  Outcome: Ongoing, Progressing     Problem: Adult Inpatient Plan of Care  Goal: Plan of Care Review  Outcome: Ongoing, Progressing  Goal: Patient-Specific Goal (Individualized)  Outcome: Ongoing, Progressing  Goal: Absence of Hospital-Acquired Illness or Injury  Outcome: Ongoing, Progressing  Goal: Optimal Comfort and Wellbeing  Outcome: Ongoing, Progressing  Goal: Readiness for Transition of Care  Outcome: Ongoing, Progressing     Problem: Fall Injury Risk  Goal: Absence of Fall and Fall-Related Injury  Outcome: Ongoing, Progressing   Plan of care reviewed with patient,verbalized understanding.  IV fluids infusing as per orders. SR  on telemetry. Neuro checks as per orders. Remains free from falls, injury. Instructed to call for assistance as needed ,verbalized understanding. Bed in low & locked position. Call light in reach, bed alarm on .

## 2023-10-26 NOTE — PLAN OF CARE
Problem: Physical Therapy  Goal: Physical Therapy Goal  Description: Goals to be met by: 10-     Patient will increase functional independence with mobility by performin. Supine to sit with [place]  2. Sit to stand transfer with Supervision  3. Bed to chair transfer with Supervision using No Assistive Device  4. Gait  x 250x2 feet with Stand-by Assistance using No Assistive Device.   5. Lower extremity exercise program x20 reps  Outcome: Ongoing, Progressing   PT eval and treat. Gait with RW 250ft x2, OOB chair. Home with supervision

## 2023-10-26 NOTE — PT/OT/SLP EVAL
Speech Language Pathology Evaluation  Cognitive/Bedside Swallow    Patient Name:  Ayla Dela Cruz   MRN:  [id number]  Admitting Diagnosis: TIA (transient ischemic attack)    Recommendations:                  General Recommendations:  Cognitive-linguistic therapy  Diet recommendations:  Regular Diet - IDDSI Level 7, Thin   Aspiration Precautions: Standard aspiration precautions   General Precautions: Standard,    Communication strategies:  provide increased time to answer and go to room if call light pushed    Assessment:     Ayla Dela Cruz is a 78 y.o. female with an SLP diagnosis of Cognitive-Linguistic Impairment.  She presents with severe cognitive deficits, especially immediate and delayed memory deficits.  She and  would benefit from tx to increase use of supports for functional daily memory. Continue regular textures & liquids IDDSI 7).    History:     Past Medical History:   Diagnosis Date    Anticoagulant long-term use     Anxiety     Arthritis     Atrial fibrillation     Basal cell carcinoma     Cancer     skin    CHF (congestive heart failure)     Depression     DVT (deep venous thrombosis)     GERD (gastroesophageal reflux disease)     Glaucoma (increased eye pressure)     Hyperlipidemia     diet controlled    Hypertension     Interstitial lung disease     Localized hives 01/10/2020    Mild persistent asthma without complication 11/12/2018    Pacemaker     Pneumonia 01/31/2014    Stroke 06/03/2014    Stroke     TIA (transient ischemic attack)     TIA (transient ischemic attack)        Past Surgical History:   Procedure Laterality Date    2 heart ablations      3 in total    A-V CARDIAC PACEMAKER INSERTION Left 10/14/2021    Procedure: INSERTION, CARDIAC PACEMAKER, DUAL CHAMBER;  Surgeon: Fco Calderon MD;  Location: [facility] LAB;  Service: Cardiology;  Laterality: Left;  PAF/ [place] Arrthymias, Dual PPM, SJM, MAC, GP, 3 PREP    ANTERIOR VITRECTOMY Right 7/27/2022    Procedure:  VITRECTOMY, ANTERIOR APPROACH;  Surgeon: Daniel Tan MD;  Location: [facility] OR;  Service: Ophthalmology;  Laterality: Right;    bilateral cataracts      CHOLECYSTECTOMY      COLONOSCOPY N/A 1/19/2017    Procedure: COLONOSCOPY and EGD;  Surgeon: Jonathan Schultz MD;  Location: [facility] ENDO;  Service: Endoscopy;  Laterality: N/A;    COLONOSCOPY N/A 10/10/2019    Procedure: COLONOSCOPY;  Surgeon: Alex Christian MD;  Location: [facility] ENDO;  Service: Endoscopy;  Laterality: N/A;    ESOPHAGOGASTRODUODENOSCOPY N/A 10/14/2019    Procedure: EGD (ESOPHAGOGASTRODUODENOSCOPY);  Surgeon: Alex Christian MD;  Location: [facility] ENDO;  Service: Endoscopy;  Laterality: N/A;    INJECTION OF ANESTHETIC AGENT AROUND MEDIAL BRANCH NERVES INNERVATING CERVICAL FACET JOINT Right 6/7/2018    Procedure: BLOCK, NERVE, FACET JOINT, MEDIAL BRANCH, CERVICAL;  Surgeon: Galo Clancy MD;  Location: [facility] OR;  Service: Pain Management;  Laterality: Right;  C4, 5, 6    OPEN REDUCTION AND INTERNAL FIXATION (ORIF) OF INJURY OF ANKLE Right 11/1/2018    Procedure: ORIF, ANKLE;  Surgeon: Wilfredo Aguero MD;  Location: [facility] OR;  Service: Orthopedics;  Laterality: Right;    PARACENTESIS, EYE, ANTERIOR CHAMBER, WITH AQUEOUS REMOVAL Right 7/27/2022    Procedure: Anterior chamber washout, possible IOL removal;  Surgeon: Daniel Tan MD;  Location: [facility] OR;  Service: Ophthalmology;  Laterality: Right;    pyloristenosis      RADIOFREQUENCY ABLATION OF LUMBAR MEDIAL BRANCH NERVE AT SINGLE LEVEL Bilateral 9/21/2018    Procedure: RADIOFREQUENCY ABLATION, NERVE, SPINAL, LUMBAR, MEDIAL BRANCH, 1 LEVEL;  Surgeon: Galo Clancy MD;  Location: [facility] OR;  Service: Pain Management;  Laterality: Bilateral;  L3, 4, 5    RADIOFREQUENCY ABLATION OF LUMBAR MEDIAL BRANCH NERVE AT SINGLE LEVEL Bilateral 2/19/2019    Procedure: Radiofrequency Ablation, Nerve, Spinal, Lumbar, Medial Branch, 1 Level;  Surgeon: Galo Clancy MD;  Location: [facility] OR;  Service: Pain Management;  Laterality:  Bilateral;  L3, 4, 5     RADIOFREQUENCY ABLATION OF LUMBAR MEDIAL BRANCH NERVE AT SINGLE LEVEL Bilateral 3/10/2020    Procedure: Radiofrequency Ablation, Nerve, Spinal, Lumbar, Medial Branch, 1 Level;  Surgeon: Galo Clancy MD;  Location: [facility] OR;  Service: Pain Management;  Laterality: Bilateral;  L3,4,5 - Burned at 80 degrees C. for 60 seconds x 2 each site      RADIOFREQUENCY ABLATION OF LUMBAR MEDIAL BRANCH NERVE AT SINGLE LEVEL Bilateral 9/11/2020    Procedure: Radiofrequency Ablation, Nerve, Spinal, Lumbar, Medial Branch, 1 Level;  Surgeon: Galo Clancy MD;  Location: [facility] OR;  Service: Pain Management;  Laterality: Bilateral;  L3, 4, 5 - Burned at 80 degrees C. for 60 seconds x 2 each site    RADIOFREQUENCY ABLATION OF LUMBAR MEDIAL BRANCH NERVE AT SINGLE LEVEL Bilateral 5/28/2021    Procedure: Radiofrequency Ablation, Nerve, Spinal, Lumbar, Medial Branch, 1 Level;  Surgeon: Galo Clancy MD;  Location: [facility] OR;  Service: Pain Management;  Laterality: Bilateral;  L3,4,5    RADIOFREQUENCY THERMAL COAGULATION OF MEDIAL BRANCH OF POSTERIOR RAMUS OF CERVICAL SPINAL NERVE Right 7/3/2018    Procedure: RADIOFREQUENCY THERMAL COAGULATION, NERVE, SPINAL, CERVICAL, MEDIAL BRANCH OF POSTERIOR RAMUS;  Surgeon: Galo Clancy MD;  Location: [facility] OR;  Service: Pain Management;  Laterality: Right;  C4,5,6 - Burned at 80 degrees C. for 75 seconds each site    RADIOFREQUENCY THERMAL COAGULATION OF MEDIAL BRANCH OF POSTERIOR RAMUS OF CERVICAL SPINAL NERVE Right 7/23/2019    Procedure: RADIOFREQUENCY THERMAL COAGULATION, NERVE, SPINAL, CERVICAL, POSTERIOR RAMUS, MEDIAL BRANCH;  Surgeon: Galo Clancy MD;  Location: [facility] OR;  Service: Pain Management;  Laterality: Right;  C4,5,6    RADIOFREQUENCY THERMAL COAGULATION OF MEDIAL BRANCH OF POSTERIOR RAMUS OF CERVICAL SPINAL NERVE Right 6/23/2020    Procedure: RADIOFREQUENCY THERMAL COAGULATION, NERVE, SPINAL, CERVICAL, POSTERIOR RAMUS, MEDIAL BRANCH;  Surgeon: Galo Clancy MD;  Location: [facility]  OR;  Service: Pain Management;  Laterality: Right;  C4, 5, 6    RADIOFREQUENCY THERMOCOAGULATION Bilateral 9/10/2019    Procedure: RADIOFREQUENCY THERMAL COAGULATION LUMBAR;  Surgeon: Galo Clancy MD;  Location: [facility] OR;  Service: Pain Management;  Laterality: Bilateral;  L3,4,5 - Burned at 80 degrees C. for 60 seconds x 2 each site    skin cancer removal       TONSILLECTOMY         Social History: Patient lives with spouse.    Prior Intubation HX:  none this admission    Modified Barium Swallow: none in Epic    Chest X-Rays: Pacemaker in place otherwise negative chest x-ray.     CT Head - There is no acute intracranial abnormality.  There is nonspecific white matter change but there is no hemorrhage, mass or obvious acute infarction.      Prior diet: regular textures & liquids.      Subjective     I taught art  Patient goals: be okay     Objective:     Cognitive Status:    Alert, cooperative,  present to correct history.  Oriented x4.  Mild immediate to severe delayed memory deficits, increasing with length and complexity as well as time. Moderate alternating attention deficit.  Severe functional problem solving deficit.        Boston Cognitive Assessment (MoCA) score - 11 out of 30 indicating severe cognitive-linguistic deficits     Receptive Language/Comprehension:  mild deficit    Pragmatics:  intact    Expressive Language: intact     Motor Speech: intact    Voice: WNL    Visual-Spatial: WFL    Reading: WFL     Written Expression:  Not tested    Oral Musculature Evaluation  Oral Musculature: WFL (slightly decreased left naso-labial fold)  Dentition: present and adequate  Secretion Management: adequate  Mucosal Quality: good  Mandibular Strength and Mobility: WFL  Oral Labial Strength and Mobility: WFL  Lingual Strength and Mobility: WFL  Velar Elevation: WFL  Buccal Strength and Mobility: WFL  Voice Prior to PO Intake: clear, no dysarthria    Bedside Swallow Eval:   Consistencies Assessed:  Thin liquids  via cup & straw  Mixed consistencies peaches in thin juice  Solids elmo cracker      Oral Phase:   WNL    Pharyngeal Phase:   no overt clinical signs/symptoms of aspiration  no overt clinical signs/symptoms of pharyngeal dysphagia    Compensatory Strategies  None    Treatment: education to pt & spouse re impressions and recommendations    Goals:   Multidisciplinary Problems       SLP Goals          Problem: SLP    Goal Priority Disciplines Outcome   SLP Goal    High SLP    Description: Pt &  will use memory supports to improve pt recall of functional daily info , modif indep, 80% trials                       Plan:     Patient to be seen:  3 x/week   Plan of Care expires:  12/01/23  Plan of Care reviewed with:  patient, spouse   SLP Follow-Up:  Yes       Discharge recommendations:  Therapy Intensity Recommendations at Discharge: Moderate Intensity Therapy   Barriers to Discharge:  None    Time Tracking:     SLP Treatment Date:   10/26/23  Speech Start Time:  1300  Speech Stop Time:  1343     Speech Total Time (min):  43 min    Billable Minutes: Eval 33  and Eval Swallow and Oral Function 10    10/26/2023

## 2023-10-26 NOTE — PLAN OF CARE
CM anticipating hospital discharge on today after cleared by neurology - secure chat sent to therapy requesting early dc recs    CM following -      10/26/23 0974   Post-Acute Status   Discharge Delays (!) Waiting for Provider to Speak to Patient

## 2023-10-26 NOTE — PLAN OF CARE
Secure chat sent to [facility] discharge clinic requesting hospital follow up per epic suggestion     10/26/23 5289   Post-Acute Status   Post-Acute Authorization Other   Other Status Awaiting f/u Appts

## 2023-10-26 NOTE — PROGRESS NOTES
Pharmacist Renal Dose Adjustment Note    Ayla Dela Cruz is a 78 y.o. female  Weight (47.3 kg) being treated with the medication Apixaban    Patient Data:    Vital Signs (Most Recent):  Temp: 97.5 °F (36.4 °C) (10/26/23 0930)  Pulse: 80 (10/26/23 0930)  Resp: 20 (10/26/23 0930)  BP: (!) 148/67 (10/26/23 0930)  SpO2: 100 % (10/26/23 0930) Vital Signs (72h Range):  Temp:  [97 °F (36.1 °C)-98.6 °F (37 °C)]   Pulse:  [64-84]   Resp:  [16-20]   BP: (126-162)/(58-82)   SpO2:  [98 %-100 %]      Recent Labs   Lab 10/25/23  1020 10/26/23  0450   CREATININE 0.8 0.8     Serum creatinine: 0.8 mg/dL 10/26/23 0450  Estimated creatinine clearance: 41.9 mL/min    Medication:Apixaban dose: 5 mg frequency BID will be changed to medication:Apixaban dose:2.5 mg frequency:BID    Pharmacist's Name: Jaret Valencia  Pharmacist's Extension: [phone]

## 2023-10-26 NOTE — CONSULTS
Food & Nutrition  Education    Diet Education: Cardiac Educations  Time Spent: Rd remote  Learners:  Patient       Nutrition Education provided with handouts:   + Clinical Reference attachments to d/c documents      Comments:  Patient is on a cardiac diet with reported decreased po intake/appetite.  Patient has been educated multiple time prior on cardiac diet.  Past medical history significant for chronic atrial fibrillation (on Eliquis), status post pacemaker placement, prior history of cerebrovascular accident with no residual neuro deficits, history of congestive heart failure, prior history of TIA and history of deep venous thrombosis.  Patient is chronically underweight since 2021.  Pending discharge at this time.  If patient does not discharge, RD to follow up with [facility] to determine nutritional risk status as deficiency is suspected.  BMI: 16.33 (underweight).  Labs reviewed.  Allergies: caffeine.  LBM: 10/25/2023. Rd to continue to monitor if patient remains inpatient.      BMP  Lab Results   Component Value Date     10/26/2023    K 3.5 10/26/2023     10/26/2023    CO2 19 (L) 10/26/2023    BUN 13 10/26/2023    CREATININE 0.8 10/26/2023    CALCIUM 9.2 10/26/2023    ANIONGAP 11 10/26/2023    EGFRNORACEVR >60 10/26/2023     Lab Results   Component Value Date    HGBA1C 5.6 10/05/2023     Lab Results   Component Value Date    CALCIUM 9.2 10/26/2023    PHOS 3.2 10/26/2023           All questions and concerns answered. Dietitian's contact information provided.       Follow-Up: Yes     Please Re-consult as needed        Thanks!  Isabella Barrow, MS, RDN, LDN

## 2023-10-26 NOTE — DISCHARGE SUMMARY
[facility] Medicine  Discharge Summary      Patient Name: Ayla Dela Cruz  MRN: [id number]  [facility]: [id number]  Patient Class: OP- Observation  Admission Date: 10/25/2023  Hospital Length of Stay: 0 days  Discharge Date and Time:  10/26/2023 9:49 AM  Attending Physician: Dale Deal MD   Discharging Provider: Dale Deal MD  Primary Care Provider: Jan Olivo MD    Primary Care Team: Networked reference to record PCT     HPI:   Patient is an 78-year-old  female with past medical history significant for chronic atrial fibrillation (on Eliquis), status post pacemaker placement, prior history of cerebrovascular accident with no residual neuro deficits, history of congestive heart failure, prior history of TIA and history of deep venous thrombosis is being admitted to Hospital Medicine from [facility] Emergency room with complaints of left facial numbness for 2 weeks.  Patient is a poor historian.  Patient's  is present at bedside who is contributing to history of present illness.  Patient denies any new motor deficits, facial asymmetry, speech dysfunction or swallow dysfunction.  At present in the emergency room patient feels her symptoms have resolved.  Patient denies any headache, vision changes, seizure activity, loss of bowel or bladder functions.  Patient also reports low appetite and has lost significant weight which is not out new finding.  Patient denies heat or cold intolerance.  Patient denies chest pain, shortness for breath or calf pain.              * No surgery found *      Hospital Course:   Patient placed on tele-monitoring and routine neuro-checks. Neuro-imaging reviewed, results below. Patient evaluated by neurology team. Patient worked with ST/PT/OT. Symptoms improved.  Patient encouraged to continue use of aspirin and apixaban.  Patient to continue close follow-up with primary care physician neurologist upon discharge.   Discharge plan of care reviewed with patient and  voiced understanding.      Goals of Care Treatment Preferences:  Code Status: Full Code      Consults:   Consults (From admission, onward)          Status Ordering Provider     Inpatient consult to Registered Dietitian/Nutritionist  Once        Provider:  (Not yet assigned)    Acknowledged JU IVEY     IP consult to case management/social work  Once        Provider:  (Not yet assigned)    Completed SULTAN, AQHODAN     Inpatient consult to Neurology  Once        Provider:  Caterina Suarez MD    Acknowledged JU IVEY     Inpatient consult to Neurology  Once        Provider:  Caterina Suarez MD    Acknowledged ROSMERY BURR          Imaging Results              CT Head Without Contrast (Final result)  Result time 10/25/23 11:10:01      Final result by Johnny Jean MD (10/25/23 11:10:01)                   Impression:      1. There is no acute intracranial abnormality.  There is nonspecific white matter change but there is no hemorrhage, mass or obvious acute infarction.  It should be noted that MRI is more sensitive in the detection of subtle or acute nonhemorrhagic ischemic disease.  2. Right sphenoid sinusitis.      Electronically signed by: Johnny Jean MD  Date:    10/25/2023  Time:    11:10               Narrative:    EXAMINATION:  CT HEAD WITHOUT CONTRAST    CLINICAL HISTORY:  Neuro deficit, acute, stroke suspected;    TECHNIQUE:  Low dose axial images were obtained through the head.  Coronal and sagittal reformations were also performed. Contrast was not administered.    COMPARISON:  CT head dated 11/15/2021    FINDINGS:  Intracranial contents: There is mild generalized volume loss.  There is mild nonspecific white matter change.  There is no intracranial hemorrhage or mass.  The gray-white interface is preserved without acute infarction.  There is no definite change compared to the prior study.  There is no abnormal extra-axial  fluid collection.  The basilar cisterns are open.  The cerebellar tonsils remain in normal position.  There is no dense vessel.    Calvarium, extracranial contents: There is mild scattered mucosal thickening in the ethmoid air cells. There is moderate to marked mucosal thickening in the right sphenoid sinus. There are changes of hyperostosis frontalis interna. There is no skull fracture. There is a left-sided torin bullosa.  The nasal septum is deviated to the right. The mastoid air cells are clear.                                       X-Ray Chest PA And Lateral (Final result)  Result time 10/25/23 10:32:13      Final result by Jonathan Mckeon Jr., MD (10/25/23 10:32:13)                   Impression:      Pacemaker in place otherwise negative chest x-ray.      Electronically signed by: Jonathan Mckeon MD  Date:    10/25/2023  Time:    10:32               Narrative:    EXAMINATION:  XR CHEST PA AND LATERAL    CLINICAL HISTORY:  Shortness of breath    TECHNIQUE:  PA and lateral views of the chest were performed.    COMPARISON:  Chest x-ray of November 15, 2021    FINDINGS:  A pacemaker is noted in place on the left with 2 leads to the right heart.  The mediastinal and cardiac size and contour are normal.  No intrapulmonary mass or infiltrate is seen.  No pneumothorax or pleural effusion is noted.                                    Final Active Diagnoses:    Diagnosis Date Noted POA    PRINCIPAL PROBLEM:  TIA (transient ischemic attack), 11/3/2014 [G45.9] 11/03/2014 Yes    Benzodiazepine dependence, Ativan 0.5 mg daily since 1/2023 [F13.20] 06/16/2023 Yes    Cardiac pacemaker in situ, [employer], dual chamber, 10/14/2021 [Z95.0] 01/12/2022 Yes    JOSE (generalized anxiety disorder) [F41.1] 11/15/2017 Yes    H/O: CVA (cerebrovascular accident), June 2014 [Z86.73] 06/03/2014 Not Applicable    GERD (gastroesophageal reflux disease) [K21.9]  Yes    Paroxysmal atrial fibrillation, ablations X 3 1995, 1997,  9/2017, CHADS-VAS score 5, HAS-BLED score 5  [I48.0]  Yes      Problems Resolved During this Admission:       Discharged Condition: good    Disposition: Home or Self Care    Follow Up:   Follow-up Information       Jan Olivo MD Follow up in 1 week(s).    Specialty: Family Medicine  Contact information:  [address]  [address]  [place] [address]  [phone]               Caterina Suarez MD Follow up in 1 week(s).    Specialty: Neurology  Contact information:  [address]  [place] [address]  [phone]                           Patient Instructions:      Diet Cardiac   Order Comments: See Stroke Patient Education Guide Booklet for details.     Call 911 for any of the following:   Order Comments: Call 911  right away if any of the following warning signs come on suddenly, even if the symptoms only last for a few minutes. With stroke, timing is very important.   - Warning Signs of Stroke:  - Weakness: You may feel a sudden weakness, tingling or loss of feeling on one side of your face or body.  - Vision Problems: You may have sudden double vision or trouble seeing in one or both eyes.  - Speech Problems: You may have sudden trouble talking, slured speech, or problems understanding others.  - Headache: You may have sudden, severe headache.  - Movement Problems: You may experience dizziness, a feeling of spinning, a loss of balance, a feeling of falling or blackouts.     Activity as tolerated   Order Comments: Fall precautions     Call MD for:  temperature >100.4     Call MD for:  persistent nausea and vomiting or diarrhea     Call MD for:  severe uncontrolled pain     Call MD for:  redness, tenderness, or signs of infection (pain, swelling, redness, odor or green/yellow discharge around incision site)     Call MD for:  difficulty breathing or increased cough     Call MD for:  severe persistent headache     Call MD for:  worsening rash     Call MD for:  persistent dizziness, light-headedness, or  visual disturbances     Call MD for:  increased confusion or weakness       Significant Diagnostic Studies: Labs:   CMP   Recent Labs   Lab 10/25/23  1020 10/26/23  0450    136   K 3.7 3.5    106   CO2 23 19*   GLU 97 88   BUN 17 13   CREATININE 0.8 0.8   CALCIUM 9.4 9.2   PROT 6.9 7.0   ALBUMIN 3.8 3.9   BILITOT 1.1* 1.8*   ALKPHOS 68 72   AST 21 23   ALT 13 16   ANIONGAP 10 11   , CBC   Recent Labs   Lab 10/25/23  1020 10/26/23  0450   WBC 11.14 6.83   HGB 14.1 14.4   HCT 43.0 45.4    300    and INR   Lab Results   Component Value Date    INR 1.1 10/26/2023    INR 1.1 10/25/2023    INR 1.3 (H) 07/23/2022       Pending Diagnostic Studies:       Procedure Component Value Units Date/Time    Folate [[id number]]     Order Status: Sent Lab Status: No result     Specimen: Blood     US Carotid Bilateral [[id number]]     Order Status: Sent Lab Status: No result     US Renal Artery Stenosis Hyperten (xpd) [[id number]]     Order Status: Sent Lab Status: No result     Vitamin B12 [[id number]]     Order Status: Sent Lab Status: No result     Specimen: Blood            Medications:  Reconciled Home Medications:      Medication List        CONTINUE taking these medications      apixaban 5 mg Tab  Commonly known as: ELIQUIS  Take 1 tablet (5 mg total) by mouth 2 (two) times daily.     aspirin 81 MG Chew  TAKE 1 TABLET BY MOUTH EVERY DAY     atropine 1% 1 % Drop  Commonly known as: ISOPTO ATROPINE  Place 1 drop into the right eye 2 (two) times a day.     busPIRone 10 MG tablet  Commonly known as: BUSPAR  Take 1 tablet (10 mg total) by mouth 3 (three) times daily.     dorzolamide-timolol 2-0.5% 22.3-6.8 mg/mL ophthalmic solution  Commonly known as: COSOPT  Place 1 drop into both eyes 3 (three) times daily.     LORazepam 0.5 MG tablet  Commonly known as: ATIVAN  Take 1 tablet (0.5 mg total) by mouth 2 (two) times daily as needed for Anxiety.     metoprolol tartrate 25 MG tablet  Commonly known as: LOPRESSOR  TAKE  1 TABLET BY MOUTH TWICE A DAY     pantoprazole 40 MG tablet  Commonly known as: PROTONIX  Take 1 tablet (40 mg total) by mouth once daily.     TRINTELLIX 20 mg Tab  Generic drug: vortioxetine  Take 1 tablet (20 mg total) by mouth Daily.              Indwelling Lines/Drains at time of discharge:   Lines/Drains/Airways       None                   Time spent on the discharge of patient: 34 minutes         Dale Deal MD  Department of Hospital Medicine  [facility]/Surg

## 2023-10-26 NOTE — TELEPHONE ENCOUNTER
Returned the call to pt's  , Praveen on this afternoon and informed him that the pt's [employer] PPM IS MRI compatible.  See information below as it relates to pt's Pacing system.

## 2023-10-26 NOTE — PLAN OF CARE
Discharge orders and chart reviewed. No other discharge needs noted at this time. Pt is clear for discharge from case management, pending neurology clearance. Pt is discharging to home.    F/u appt scheduled and added to avs    Secure chat sent to neurology team regarding clearance - awaiting response at this time     10/26/23 1511   Final Note   Assessment Type Final Discharge Note   Anticipated Discharge Disposition Home   What phone number can be called within the next 1-3 days to see how you are doing after discharge? [phone]   Hospital Resources/Appts/Education Provided Appointments scheduled and added to AVS   Post-Acute Status   Discharge Delays (!) Waiting for Provider to Speak to Patient

## 2023-10-26 NOTE — PLAN OF CARE
Pt &  will use memory supports to improve pt recall of functional daily info , modif indep, 80% trials

## 2023-10-26 NOTE — CONSULTS
[facility] - Med/Surg  Neurology  Consult Note    Patient Name: Ayla Dela Cruz  MRN: [id number]  Admission Date: 10/25/2023  Hospital Length of Stay: 0 days  Code Status: Full Code   Attending Provider: Dale Deal MD   Consulting Provider: Qing Fuller DNP  Primary Care Physician: Jan Olivo MD  Principal Problem:TIA (transient ischemic attack)    Inpatient consult to Neurology  Consult performed by: Qing Fuller DNP  Consult ordered by: Dale Deal MD        Subjective:     Chief Complaint:     Shortness of Breath        With nasal congestion. Intermittent for the past week -- worse this AM       HPI: as per EMR: Patient is an 78-year-old  female with past medical history significant for chronic atrial fibrillation (on Eliquis), status post pacemaker placement, prior history of cerebrovascular accident with no residual neuro deficits, history of congestive heart failure, prior history of TIA and history of deep venous thrombosis is being admitted to Hospital Medicine from [facility] Emergency room with complaints of left facial numbness for 2 weeks.  Patient is a poor historian.  Patient's  is present at bedside who is contributing to history of present illness.  Patient denies any new motor deficits, facial asymmetry, speech dysfunction or swallow dysfunction.  At present in the emergency room patient feels her symptoms have resolved.  Patient denies any headache, vision changes, seizure activity, loss of bowel or bladder functions.  Patient also reports low appetite and has lost significant weight which is not out new finding.  Patient denies heat or cold intolerance.  Patient denies chest pain, shortness for breath or calf pain.     NEUROLOGY CONSULT NOTE: Patient seen and examined, POC discussed with Dr Suarez. Patient is alert and oriented x 3 sitting up in chair at bedside.  and family friends at bedside. Patient reports she  experienced two episodes of numbness to the inside of her left cheek and left neck. She reports it has since resolved. She denies vision changes, speech changes, sensory deficits or focal weakness. She denies gait instability, falls at home. She reports compliance on Eliquis and aspirin at home. She reports statin intolerance.     Past Medical History:   Diagnosis Date    Anticoagulant long-term use     Anxiety     Arthritis     Atrial fibrillation     Basal cell carcinoma     Cancer     skin    CHF (congestive heart failure)     Depression     DVT (deep venous thrombosis)     GERD (gastroesophageal reflux disease)     Glaucoma (increased eye pressure)     Hyperlipidemia     diet controlled    Hypertension     Interstitial lung disease     Localized hives 01/10/2020    Mild persistent asthma without complication 11/12/2018    Pacemaker     Pneumonia 01/31/2014    Stroke 06/03/2014    Stroke     TIA (transient ischemic attack)     TIA (transient ischemic attack)        Past Surgical History:   Procedure Laterality Date    2 heart ablations      3 in total    A-V CARDIAC PACEMAKER INSERTION Left 10/14/2021    Procedure: INSERTION, CARDIAC PACEMAKER, DUAL CHAMBER;  Surgeon: Fco Calderon MD;  Location: [facility] EP LAB;  Service: Cardiology;  Laterality: Left;  PAF/ [place] Arrthymias, Dual PPM, SJM, MAC, GP, 3 PREP    ANTERIOR VITRECTOMY Right 7/27/2022    Procedure: VITRECTOMY, ANTERIOR APPROACH;  Surgeon: Daniel Tan MD;  Location: [facility] OR;  Service: Ophthalmology;  Laterality: Right;    bilateral cataracts      CHOLECYSTECTOMY      COLONOSCOPY N/A 1/19/2017    Procedure: COLONOSCOPY and EGD;  Surgeon: Jonathan Schultz MD;  Location: [facility];  Service: Endoscopy;  Laterality: N/A;    COLONOSCOPY N/A 10/10/2019    Procedure: COLONOSCOPY;  Surgeon: Alex Christian MD;  Location: [facility];  Service: Endoscopy;  Laterality: N/A;    ESOPHAGOGASTRODUODENOSCOPY N/A 10/14/2019    Procedure: EGD  (ESOPHAGOGASTRODUODENOSCOPY);  Surgeon: Alex Christian MD;  Location: [facility];  Service: Endoscopy;  Laterality: N/A;    INJECTION OF ANESTHETIC AGENT AROUND MEDIAL BRANCH NERVES INNERVATING CERVICAL FACET JOINT Right 6/7/2018    Procedure: BLOCK, NERVE, FACET JOINT, MEDIAL BRANCH, CERVICAL;  Surgeon: Galo Clancy MD;  Location: [facility] OR;  Service: Pain Management;  Laterality: Right;  C4, 5, 6    OPEN REDUCTION AND INTERNAL FIXATION (ORIF) OF INJURY OF ANKLE Right 11/1/2018    Procedure: ORIF, ANKLE;  Surgeon: Wilfredo Aguero MD;  Location: [facility] OR;  Service: Orthopedics;  Laterality: Right;    PARACENTESIS, EYE, ANTERIOR CHAMBER, WITH AQUEOUS REMOVAL Right 7/27/2022    Procedure: Anterior chamber washout, possible IOL removal;  Surgeon: Daniel Tan MD;  Location: [facility] OR;  Service: Ophthalmology;  Laterality: Right;    pyloristenosis      RADIOFREQUENCY ABLATION OF LUMBAR MEDIAL BRANCH NERVE AT SINGLE LEVEL Bilateral 9/21/2018    Procedure: RADIOFREQUENCY ABLATION, NERVE, SPINAL, LUMBAR, MEDIAL BRANCH, 1 LEVEL;  Surgeon: Galo Clancy MD;  Location: [facility] OR;  Service: Pain Management;  Laterality: Bilateral;  L3, 4, 5    RADIOFREQUENCY ABLATION OF LUMBAR MEDIAL BRANCH NERVE AT SINGLE LEVEL Bilateral 2/19/2019    Procedure: Radiofrequency Ablation, Nerve, Spinal, Lumbar, Medial Branch, 1 Level;  Surgeon: Galo Clancy MD;  Location: [facility] OR;  Service: Pain Management;  Laterality: Bilateral;  L3, 4, 5     RADIOFREQUENCY ABLATION OF LUMBAR MEDIAL BRANCH NERVE AT SINGLE LEVEL Bilateral 3/10/2020    Procedure: Radiofrequency Ablation, Nerve, Spinal, Lumbar, Medial Branch, 1 Level;  Surgeon: Galo Clancy MD;  Location: [facility] OR;  Service: Pain Management;  Laterality: Bilateral;  L3,4,5 - Burned at 80 degrees C. for 60 seconds x 2 each site      RADIOFREQUENCY ABLATION OF LUMBAR MEDIAL BRANCH NERVE AT SINGLE LEVEL Bilateral 9/11/2020    Procedure: Radiofrequency Ablation, Nerve, Spinal, Lumbar, Medial Branch, 1  Level;  Surgeon: Galo Clancy MD;  Location: [facility] OR;  Service: Pain Management;  Laterality: Bilateral;  L3, 4, 5 - Burned at 80 degrees C. for 60 seconds x 2 each site    RADIOFREQUENCY ABLATION OF LUMBAR MEDIAL BRANCH NERVE AT SINGLE LEVEL Bilateral 5/28/2021    Procedure: Radiofrequency Ablation, Nerve, Spinal, Lumbar, Medial Branch, 1 Level;  Surgeon: Galo Clancy MD;  Location: [facility] OR;  Service: Pain Management;  Laterality: Bilateral;  L3,4,5    RADIOFREQUENCY THERMAL COAGULATION OF MEDIAL BRANCH OF POSTERIOR RAMUS OF CERVICAL SPINAL NERVE Right 7/3/2018    Procedure: RADIOFREQUENCY THERMAL COAGULATION, NERVE, SPINAL, CERVICAL, MEDIAL BRANCH OF POSTERIOR RAMUS;  Surgeon: Galo Clancy MD;  Location: [facility] OR;  Service: Pain Management;  Laterality: Right;  C4,5,6 - Burned at 80 degrees C. for 75 seconds each site    RADIOFREQUENCY THERMAL COAGULATION OF MEDIAL BRANCH OF POSTERIOR RAMUS OF CERVICAL SPINAL NERVE Right 7/23/2019    Procedure: RADIOFREQUENCY THERMAL COAGULATION, NERVE, SPINAL, CERVICAL, POSTERIOR RAMUS, MEDIAL BRANCH;  Surgeon: Galo Clancy MD;  Location: [facility] OR;  Service: Pain Management;  Laterality: Right;  C4,5,6    RADIOFREQUENCY THERMAL COAGULATION OF MEDIAL BRANCH OF POSTERIOR RAMUS OF CERVICAL SPINAL NERVE Right 6/23/2020    Procedure: RADIOFREQUENCY THERMAL COAGULATION, NERVE, SPINAL, CERVICAL, POSTERIOR RAMUS, MEDIAL BRANCH;  Surgeon: Galo Clancy MD;  Location: [facility] OR;  Service: Pain Management;  Laterality: Right;  C4, 5, 6    RADIOFREQUENCY THERMOCOAGULATION Bilateral 9/10/2019    Procedure: RADIOFREQUENCY THERMAL COAGULATION LUMBAR;  Surgeon: Galo Clancy MD;  Location: [facility] OR;  Service: Pain Management;  Laterality: Bilateral;  L3,4,5 - Burned at 80 degrees C. for 60 seconds x 2 each site    skin cancer removal       TONSILLECTOMY         Review of patient's allergies indicates:   Allergen Reactions    Gabapentin Hallucinations    Xarelto [rivaroxaban] Rash    Bactrim  [sulfamethoxazole-trimethoprim] Other (See Comments)     Upset stomach, dry heaves, confusion    Afrin (pseudoephedrine)     Amoxicillin-pot clavulanate      Other reaction(s): Mental Status Change    Atorvastatin      Other reaction(s): Joint pain    Baclofen     Baclofen (bulk) Nausea And Vomiting    Ciprofloxacin     Decongest tabs      Other reaction(s): increased heart rate    Decongestant [pseudoephedrine hcl]     Erythromycin Other (See Comments)    Flecainide Hives     And SOB. No reaction to Lidocaine     Fluoxetine      Other reaction(s): heart palpitations  Other reaction(s): anxiety    Lisinopril Other (See Comments)     cough    Losartan Other (See Comments)     Hypotension with lightheadedness    Morphine Other (See Comments)     confusion    Tramadol Other (See Comments)     SOB, low BP    Venlafaxine     Venlafaxine analogues      Changes in BP and increases heart rate       Afrin [oxymetazoline] Palpitations    Caffeine Palpitations    Dabigatran etexilate Rash    Tizanidine Anxiety     dizziness       Current Neurological Medications: apixaban, aspirin     Current Facility-Administered Medications on File Prior to Encounter   Medication    bupivacaine (PF) 0.25% (2.5 mg/ml) injection    lidocaine (PF) 10 mg/ml (1%) injection    lidocaine (PF) 20 mg/ml (2%) injection    methylPREDNISolone acetate injection     Current Outpatient Medications on File Prior to Encounter   Medication Sig    apixaban (ELIQUIS) 5 mg Tab Take 1 tablet (5 mg total) by mouth 2 (two) times daily.    aspirin 81 MG Chew TAKE 1 TABLET BY MOUTH EVERY DAY    atropine 1% (ISOPTO ATROPINE) 1 % Drop Place 1 drop into the right eye 2 (two) times a day.    busPIRone (BUSPAR) 10 MG tablet Take 1 tablet (10 mg total) by mouth 3 (three) times daily.    dorzolamide-timolol 2-0.5% (COSOPT) 22.3-6.8 mg/mL ophthalmic solution Place 1 drop into both eyes 3 (three) times daily.    LORazepam (ATIVAN) 0.5 MG tablet Take 1 tablet (0.5 mg total) by  mouth 2 (two) times daily as needed for Anxiety.    metoprolol tartrate (LOPRESSOR) 25 MG tablet TAKE 1 TABLET BY MOUTH TWICE A DAY    pantoprazole (PROTONIX) 40 MG tablet Take 1 tablet (40 mg total) by mouth once daily.    vortioxetine (TRINTELLIX) 20 mg Tab Take 1 tablet (20 mg total) by mouth Daily.    [DISCONTINUED] brimonidine 0.2% (ALPHAGAN) 0.2 % Drop Place 1 drop into the right eye 3 (three) times daily.      Family History       Problem Relation (Age of Onset)    Alzheimer's disease Maternal Uncle    Arthritis Mother    Asthma Mother    Cancer Maternal Grandfather, Paternal Grandmother    Depression Son    Emphysema Maternal Grandfather    Heart disease Father    Kidney disease Maternal Grandfather    Pneumonia Mother, Paternal Grandfather    Rheum arthritis Mother, Maternal Grandmother    Skin cancer Mother    Ulcers Father          Tobacco Use    Smoking status: Never    Smokeless tobacco: Never   Substance and Sexual Activity    Alcohol use: No    Drug use: No    Sexual activity: Yes     Partners: Male     Review of Systems   Constitutional: Negative.    HENT: Negative.     Respiratory: Negative.     Cardiovascular: Negative.    Neurological:  Positive for numbness. Negative for dizziness, seizures, facial asymmetry and weakness.     Objective:     Vital Signs (Most Recent):  Temp: 97.5 °F (36.4 °C) (10/26/23 0930)  Pulse: 80 (10/26/23 0930)  Resp: 20 (10/26/23 0930)  BP: (!) 148/67 (10/26/23 0930)  SpO2: 100 % (10/26/23 0930) Vital Signs (24h Range):  Temp:  [97 °F (36.1 °C)-98.6 °F (37 °C)] 97.5 °F (36.4 °C)  Pulse:  [64-84] 80  Resp:  [16-20] 20  SpO2:  [98 %-100 %] 100 %  BP: (126-162)/(58-82) 148/67     Weight: 47.3 kg (104 lb 4.4 oz)  Body mass index is 16.33 kg/m².    Physical Exam  Vitals and nursing note reviewed.   HENT:      Head: Normocephalic.      Mouth/Throat:      Mouth: Mucous membranes are moist.      Pharynx: Oropharynx is clear.   Eyes:      Extraocular Movements: EOM normal.       Pupils: Pupils are equal, round, and reactive to light.   Cardiovascular:      Rate and Rhythm: Normal rate.   Pulmonary:      Effort: Pulmonary effort is normal.   Musculoskeletal:         General: Normal range of motion.      Cervical back: Normal range of motion.   Skin:     General: Skin is warm and dry.   Neurological:      General: No focal deficit present.      Mental Status: She is alert and oriented to person, place, and time.      Motor: Motor strength is normal.     Coordination: Finger-Nose-Finger Test normal.   Psychiatric:         Speech: Speech normal.         NEUROLOGICAL EXAMINATION:     MENTAL STATUS   Oriented to person, place, and time.   Attention: normal.   Speech: speech is normal   Level of consciousness: alert  Knowledge: good.   Able to name object. Able to repeat. Normal comprehension.     CRANIAL NERVES     CN II   Right visual field deficit: none  Left visual field deficit: none     CN III, IV, VI   Pupils are equal, round, and reactive to light.  Extraocular motions are normal.   Right pupil: Size: 3 mm.   Left pupil: Size: 3 mm.   CN VI: no CN VI palsy  Nystagmus: none   Diplopia: none  Ophthalmoparesis: none    CN V   Facial sensation intact.     CN VII   Facial expression full, symmetric.     CN VIII   CN VIII normal.     CN IX, X   CN IX normal.   CN X normal.     CN XI   CN XI normal.     CN XII   CN XII normal.     MOTOR EXAM   Right arm pronator drift: absent  Left arm pronator drift: absent    Strength   Strength 5/5 throughout.     SENSORY EXAM   Light touch normal.     GAIT AND COORDINATION      Coordination   Finger to nose coordination: normal    Tremor   Resting tremor: absent    Current NIH Stroke Score Values      Flowsheet Row Most Recent Value   Interval shift assessment   1a. Level of Consciousness 0-->Alert, keenly responsive   1b. LOC Questions 0-->Answers both questions correctly   1c. LOC Commands 0-->Performs both tasks correctly   2. Best Gaze 0-->Normal   3.  Visual 0-->No visual loss   4. Facial Palsy 0-->Normal symmetrical movements   5a. Motor Arm, Left 0-->No drift, limb holds 90 (or 45) degrees for full 10 secs   5b. Motor Arm, Right 0-->No drift, limb holds 90 (or 45) degrees for full 10 secs   6a. Motor Leg, Left 0-->No drift, leg holds 30 degree position for full 5 secs   6b. Motor Leg, Right 0-->No drift, leg holds 30 degree position for full 5 secs   7. Limb Ataxia 0-->Absent   8. Sensory 0-->Normal, no sensory loss   9. Best Language 0-->No aphasia, normal   10. Dysarthria 0-->Normal   11. Extinction and Inattention (formerly Neglect) 0-->No abnormality   Total (NIH Stroke Scale) 0            Significant Labs: Hemoglobin A1c:   Recent Labs   Lab 10/05/23  0809   HGBA1C 5.6     BMP:   Recent Labs   Lab 10/25/23  1020 10/26/23  0450   GLU 97 88    136   K 3.7 3.5    106   CO2 23 19*   BUN 17 13   CREATININE 0.8 0.8   CALCIUM 9.4 9.2   MG  --  2.1     CBC:   Recent Labs   Lab 10/25/23  1020 10/26/23  0450   WBC 11.14 6.83   HGB 14.1 14.4   HCT 43.0 45.4    300     CMP:   Recent Labs   Lab 10/25/23  1020 10/26/23  0450   GLU 97 88    136   K 3.7 3.5    106   CO2 23 19*   BUN 17 13   CREATININE 0.8 0.8   CALCIUM 9.4 9.2   MG  --  2.1   PROT 6.9 7.0   ALBUMIN 3.8 3.9   BILITOT 1.1* 1.8*   ALKPHOS 68 72   AST 21 23   ALT 13 16   ANIONGAP 10 11     Recent Lab Results  (Last 5 results in the past 24 hours)        10/26/23  0450   10/25/23  1535   10/25/23  1020   10/25/23  1018   10/25/23  1008        Albumin 3.9     3.8           ALP 72     68           ALT 16     13           Anion Gap 11     10           aPTT 29.7  Comment: Refer to local heparin nomogram for intensity/dose specific   therapeutic   range.                 AST 23     21           Baso # 0.04     0.05           Basophil % 0.6     0.4           BILIRUBIN TOTAL 1.8  Comment: For infants and newborns, interpretation of results should be based  on gestational age, weight and  in agreement with clinical  observations.    Premature Infant recommended reference ranges:  Up to 24 hours.............<8.0 mg/dL  Up to 48 hours............<12.0 mg/dL  3-5 days..................<15.0 mg/dL  6-29 days.................<15.0 mg/dL       1.1  Comment: For infants and newborns, interpretation of results should be based  on gestational age, weight and in agreement with clinical  observations.    Premature Infant recommended reference ranges:  Up to 24 hours.............<8.0 mg/dL  Up to 48 hours............<12.0 mg/dL  3-5 days..................<15.0 mg/dL  6-29 days.................<15.0 mg/dL             BNP     75  Comment: Values of less than 100 pg/ml are consistent with non-CHF populations.           BUN 13     17           Calcium 9.2     9.4           Chloride 106     106           Cholesterol Total   218  Comment: The National Cholesterol Education Program (NCEP) has set the  following guidelines (reference ranges) for Cholesterol:  Optimal.....................<200 mg/dL  Borderline High.............200-239 mg/dL  High........................> or = 240 mg/dL               CO2 19     23           Creatinine 0.8     0.8           D-Dimer       0.33  Comment: The quantitative D-dimer assay should be used as an aid in   the diagnosis of deep vein thrombosis and pulmonary embolism  in patients with the appropriate presentation and clinical  history. The upper limit of the reference interval and the clinical   cut off   point are identical. Causes of a positive (>0.50 mg/L FEU) D-Dimer   test  include, but are not limited to: DVT, PE, DIC, thrombolytic   therapy, anticoagulant therapy, recent surgery, trauma, or   pregnancy, disseminated malignancy, aortic aneurysm, cirrhosis,  and severe infection. False negative results may occur in   patients with distal DVT.           Differential Method Automated     Automated           eGFR >60     >60           Eos # 0.3     0.2           Eosinophil % 3.7      2.2           Glucose 88     97           Gran # (ANC) 4.5     8.6           Gran % 65.5     77.6           HDL   74  Comment: The National Cholesterol Education Program (NCEP) has set the  following guidelines (reference values) for HDL Cholesterol:  Low...............<40 mg/dL  Optimal...........>60 mg/dL               HDL/Cholesterol Ratio   33.9             Hematocrit 45.4     43.0           Hemoglobin 14.4     14.1           Immature Grans (Abs) 0.04  Comment: Mild elevation in immature granulocytes is non specific and   can be seen in a variety of conditions including stress response,   acute inflammation, trauma and pregnancy. Correlation with other   laboratory and clinical findings is essential.       0.06  Comment: Mild elevation in immature granulocytes is non specific and   can be seen in a variety of conditions including stress response,   acute inflammation, trauma and pregnancy. Correlation with other   laboratory and clinical findings is essential.             Immature Granulocytes 0.6     0.5           INR 1.1  Comment: Coumadin Therapy:  2.0 - 3.0 for INR for all indicators except mechanical heart valves  and antiphospholipid syndromes which should use 2.5 - 3.5.       1.1  Comment: Coumadin Therapy:  2.0 - 3.0 for INR for all indicators except mechanical heart valves  and antiphospholipid syndromes which should use 2.5 - 3.5.             LDL Cholesterol External   129.0  Comment: The National Cholesterol Education Program (NCEP) has set the  following guidelines (reference values) for LDL Cholesterol:  Optimal.......................<130 mg/dL  Borderline High...............130-159 mg/dL  High..........................160-189 mg/dL  Very High.....................>190 mg/dL               Lymph # 1.2     1.2           Lymph % 18.0     10.7           Magnesium  2.1               MCH 30.6     31.3           MCHC 31.7     32.8           MCV 96     95           Mono # 0.8     1.0           Mono % 11.6      8.6           MPV 9.1     9.1           Non-HDL Cholesterol   144  Comment: Risk category and Non-HDL cholesterol goals:  Coronary heart disease (CHD)or equivalent (10-year risk of CHD >20%):  Non-HDL cholesterol goal     <130 mg/dL  Two or more CHD risk factors and 10-year risk of CHD <= 20%:  Non-HDL cholesterol goal     <160 mg/dL  0 to 1 CHD risk factor:  Non-HDL cholesterol goal     <190 mg/dL               nRBC 0     0           Phosphorus Level 3.2               Platelet Count 300     272           Potassium 3.5     3.7           PROTEIN TOTAL 7.0     6.9           Protime 11.4     11.4           RBC 4.71     4.51           RDW 13.6     13.5           SARS-CoV-2 RNA, Amplification, Qual         Negative  Comment: This test utilizes isothermal nucleic acid amplification technology   to   detect the SARS-CoV-2 RdRp nucleic acid segment. The analytical   sensitivity   (limit of detection) is 500 copies/swab.     A POSITIVE result is indicative of the presence of SARS-CoV-2 RNA;   clinical   correlation with patient history and other diagnostic information is   necessary to determine patient infection status.    A NEGATIVE result means that SARS-CoV-2 nucleic acids are not present   above   the limit of detection. A NEGATIVE result should be treated as   presumptive.   It does not rule out the possibility of COVID-19 and should not be   the sole   basis for treatment decisions. If COVID-19 is strongly suspected   based on   clinical and exposure history, re-testing using an alternate   molecular assay   should be considered.     This test is only for use under the Food and Drug Administration s   Emergency   Use Authorization (EUA).     Commercial kits are provided by [employer]. Performance   characteristics of the EUA have been independently verified by   [facility] Department of Pathology and Laboratory Medicine.   _________________________________________________________________   The  authorized Fact Sheet for Healthcare Providers and the authorized   Fact   Sheet for Patients of the ID NOW COVID-19 are available on the FDA   website:   https://www.fda.gov/media/299803/download   https://www.fda.gov/media/202571/download         Sodium 136     139           Total Cholesterol/HDL Ratio   2.9             Triglycerides   75  Comment: The National Cholesterol Education Program (NCEP) has set the  following guidelines (reference values) for triglycerides:  Normal......................<150 mg/dL  Borderline High.............150-199 mg/dL  High........................200-499 mg/dL               Troponin I     <0.006  Comment: The reference interval for Troponin I represents the 99th percentile   cutoff   for our facility and is consistent with 3rd generation assay   performance.             TSH     1.078           WBC 6.83     11.14                                All pertinent lab results from the past 24 hours have been reviewed.    Significant Imaging: I have reviewed and interpreted all pertinent imaging results/findings within the past 24 hours.  CT Head Without Contrast  Order: [id number]  Status: Final result     Visible to patient: Yes (not seen)     Next appt: 11/27/2023 at 02:30 PM in Ophthalmology (Daniel Tan MD)     0 Result Notes  Details    Reading Physician Reading Date Result Priority   Johnny Jean MD  [phone] 10/25/2023 STAT     Narrative & Impression  EXAMINATION:  CT HEAD WITHOUT CONTRAST     CLINICAL HISTORY:  Neuro deficit, acute, stroke suspected;     TECHNIQUE:  Low dose axial images were obtained through the head.  Coronal and sagittal reformations were also performed. Contrast was not administered.     COMPARISON:  CT head dated 11/15/2021     FINDINGS:  Intracranial contents: There is mild generalized volume loss.  There is mild nonspecific white matter change.  There is no intracranial hemorrhage or mass.  The gray-white interface is preserved without acute  infarction.  There is no definite change compared to the prior study.  There is no abnormal extra-axial fluid collection.  The basilar cisterns are open.  The cerebellar tonsils remain in normal position.  There is no dense vessel.     Calvarium, extracranial contents: There is mild scattered mucosal thickening in the ethmoid air cells. There is moderate to marked mucosal thickening in the right sphenoid sinus. There are changes of hyperostosis frontalis interna. There is no skull fracture. There is a left-sided torin bullosa.  The nasal septum is deviated to the right. The mastoid air cells are clear.     Impression:     1. There is no acute intracranial abnormality.  There is nonspecific white matter change but there is no hemorrhage, mass or obvious acute infarction.  It should be noted that MRI is more sensitive in the detection of subtle or acute nonhemorrhagic ischemic disease.  2. Right sphenoid sinusitis.        Electronically signed by: Johnny Jean MD  Date:                                            10/25/2023  Time:                                           11:10   CTA Head and Neck (xpd)  Order: [id number]  Status: Final result     Visible to patient: Yes (not seen)     Next appt: 11/27/2023 at 02:30 PM in Ophthalmology (Daniel Tan MD)     0 Result Notes  Details    Reading Physician Reading Date Result Priority   Bryant Alexandra MD  [phone]  [phone] 10/25/2023 Routine     Narrative & Impression  EXAMINATION:  CTA HEAD AND NECK (XPD)     CLINICAL HISTORY:  Stroke, follow up;     TECHNIQUE:  Non contrast low dose axial images were obtained through the head.  CT angiogram was performed from the level of the ortega to the top of the head following the IV administration of 75mL of Omnipaque 350.   Sagittal and coronal reconstructions and maximum intensity projection reconstructions were performed. Arterial stenosis percentages are based on NASCET measurement criteria.      COMPARISON:  CT earlier same day     FINDINGS:  Normal size ventricular system.  Mild cerebral involutional change and periventricular white matter disease.  Near complete opacification right sphenoid sinus.  Mastoid air cells and paranasal sinuses are clear.  No abnormal intracranial enhancement.  No mass or abnormal enhancement of the aero digestive tract.  There is a 1.7 cm peripherally enhancing lesion along the right thyroid lobe, posterior margin.  There are a few additional subcentimeter hypodense lesions in the left thyroid lobe.  Remaining glandular tissue in the neck unremarkable.  Partially imaged cardiac conduction device.  No cervical adenopathy.  Multilevel cervical spine degenerative changes.     Normal 3 vessel arch.  Bilateral subclavian arteries are patent.  Right common carotid artery is patent.  Plaque along the right carotid bulb with less than 50% stenosis.  Patent right ICA with luminal irregularity or beading in the proximal segment as seen coronal series 609, image 43 and sagittal series 610, image 57..  Patent left common carotid artery.  Plaque left carotid bulb with less than 50% stenosis.  Patent left ICA.  Bilateral MCAs and ACAs are patent.  Right P1 artery is diminutive, likely on a congenital basis..  There is an anterior communicating artery.     Right vertebral artery origin and course are patent.  Left vertebral artery origin and course are patent.  There is slight left vertebral artery dominant.  Basilar artery and PCAs are patent.     Impression:     1. Beaded appearance of the right proximal and mid ICA reason the possibility from fibromuscular dysplasia.  Atherosclerosis is in the differential although plaque is not clearly evident.  2. No aneurysm or hemodynamically significant stenosis involving the head and neck arterial vasculature.  3. Mild generalized cerebral atrophy and white matter disease.  4. Bilateral thyroid nodules.  Consider ultrasound in an elective setting  for further characterization.        Electronically signed by: Bryant Alexandra  Date:                                            10/25/2023  Time:                                           18:19     Assessment and Plan:    CTH: negative for acute changes, chronic changes noted above   CTA head and neck: Right proximal and mid ICA with beaded appearance possibly from fibromuscular dysplasia; BL thyroid nodules- US being recommended   MRI brain wo contrast: PMM in situ;  to find out if PM is compatible     Plan:  - Admitted to hospital medicine with q4 hour neuro checks, on telemetry, continuous pulse oximetry  - Diet after eval per SLP.  - Secondary stroke prevention with ASA 81 mg daily,  apixaban 5 mg daily, patient has intolerance to statins.    - Risk factor stratification with HgbA1C- 5.6, Fasting Lipid profile/ LDL- 142.4, TSH - WNL  - 2D echocardiogram as above  -Renal artery ultrasound and carotid ultrasound ordered to further assess finding of fibromuscular dysplasia found on CTA head and neck   - Maintain Euthermia with Tylenol prn temp > 37.2 degrees C.  - Assessment for rehab with PT/OT/SLP evaluation and treatment.  - Permissive HTN systolic BP goal up to 220, diastolic up to 110 for 24H, then lower BP slowly to normotension    Patient to follow up with [facility] at [phone] within 2 weeks from discharge.  Stroke education was provided including stroke risk factors modification and any acute neurological changes including weakness, confusion, visual changes to come straight to the ER.  Seizure educaation was provided including no driving, no swimming by self, no operation of heavy machinery or climbing on ladders.  All side effects of new medications were discussed with patient and/or next of kin and all questions were answered.       Active Diagnoses:    Diagnosis Date Noted POA    PRINCIPAL PROBLEM:  TIA (transient ischemic attack), 11/3/2014 [G45.9] 11/03/2014 Yes     Benzodiazepine dependence, Ativan 0.5 mg daily since 1/2023 [F13.20] 06/16/2023 Yes    Cardiac pacemaker in situ, [employer], dual chamber, 10/14/2021 [Z95.0] 01/12/2022 Yes    JOSE (generalized anxiety disorder) [F41.1] 11/15/2017 Yes    H/O: CVA (cerebrovascular accident), June 2014 [Z86.73] 06/03/2014 Not Applicable    GERD (gastroesophageal reflux disease) [K21.9]  Yes    Paroxysmal atrial fibrillation, ablations X 3 1995, 1997, 9/2017, CHADS-VAS score 5, HAS-BLED score 5  [I48.0]  Yes      Problems Resolved During this Admission:       VTE Risk Mitigation (From admission, onward)           Ordered     apixaban tablet 5 mg  2 times daily         10/25/23 1506     IP VTE HIGH RISK PATIENT  Once         10/25/23 1506     Place sequential compression device  Until discontinued         10/25/23 1506                    Thank you for your consult. I will follow-up with patient. Please contact us if you have any additional questions.    Qing Fuller, STACIA  Neurology  [facility]/Surg

## 2023-10-27 RX ORDER — ATROPINE SULFATE 10 MG/ML
SOLUTION/ DROPS OPHTHALMIC
Qty: 5 ML | Refills: 6 | Status: SHIPPED | OUTPATIENT
Start: 2023-10-27

## 2023-10-27 NOTE — NURSING
Discharged patient per MD order. Educated patient about medications and when to make her follow up appointments. Also, went over stroke handbook with patient. NIH completed prior to DC. Removed patients IV and tele. Was taken by this RN via wheelchair to her spouses vehicle outside.

## 2023-10-29 ENCOUNTER — HOSPITAL ENCOUNTER (EMERGENCY)
Facility: HOSPITAL | Age: 78
Discharge: HOME | End: 2023-10-29
Attending: EMERGENCY MEDICINE
Payer: MEDICARE

## 2023-10-29 VITALS
BODY MASS INDEX: 16.79 KG/M2 | RESPIRATION RATE: 20 BRPM | TEMPERATURE: 98 F | HEART RATE: 71 BPM | WEIGHT: 107 LBS | OXYGEN SATURATION: 100 % | SYSTOLIC BLOOD PRESSURE: 150 MMHG | HEIGHT: 67 IN | DIASTOLIC BLOOD PRESSURE: 67 MMHG

## 2023-10-29 DIAGNOSIS — R06.00 DYSPNEA, UNSPECIFIED TYPE: Primary | ICD-10-CM

## 2023-10-29 LAB
ALBUMIN SERPL BCP-MCNC: 3.8 G/DL (ref 3.5–5.2)
ALP SERPL-CCNC: 70 U/L (ref 55–135)
ALT SERPL W/O P-5'-P-CCNC: 16 U/L (ref 10–44)
ANION GAP SERPL CALC-SCNC: 12 MMOL/L (ref 8–16)
AST SERPL-CCNC: 23 U/L (ref 10–40)
BASOPHILS # BLD AUTO: 0.04 K/UL (ref 0–0.2)
BASOPHILS NFR BLD: 0.5 % (ref 0–1.9)
BILIRUB SERPL-MCNC: 0.9 MG/DL (ref 0.1–1)
BNP SERPL-MCNC: 80 PG/ML (ref 0–99)
BUN SERPL-MCNC: 13 MG/DL (ref 8–23)
CALCIUM SERPL-MCNC: 9.7 MG/DL (ref 8.7–10.5)
CHLORIDE SERPL-SCNC: 109 MMOL/L (ref 95–110)
CO2 SERPL-SCNC: 19 MMOL/L (ref 23–29)
CREAT SERPL-MCNC: 0.8 MG/DL (ref 0.5–1.4)
DIFFERENTIAL METHOD: ABNORMAL
EOSINOPHIL # BLD AUTO: 0.3 K/UL (ref 0–0.5)
EOSINOPHIL NFR BLD: 3.1 % (ref 0–8)
ERYTHROCYTE [DISTWIDTH] IN BLOOD BY AUTOMATED COUNT: 13.4 % (ref 11.5–14.5)
EST. GFR  (NO RACE VARIABLE): >60 ML/MIN/1.73 M^2
GLUCOSE SERPL-MCNC: 92 MG/DL (ref 70–110)
HCT VFR BLD AUTO: 42.1 % (ref 37–48.5)
HGB BLD-MCNC: 14.3 G/DL (ref 12–16)
IMM GRANULOCYTES # BLD AUTO: 0.02 K/UL (ref 0–0.04)
IMM GRANULOCYTES NFR BLD AUTO: 0.2 % (ref 0–0.5)
LYMPHOCYTES # BLD AUTO: 1.3 K/UL (ref 1–4.8)
LYMPHOCYTES NFR BLD: 14.1 % (ref 18–48)
MCH RBC QN AUTO: 31.9 PG (ref 27–31)
MCHC RBC AUTO-ENTMCNC: 34 G/DL (ref 32–36)
MCV RBC AUTO: 94 FL (ref 82–98)
MONOCYTES # BLD AUTO: 1 K/UL (ref 0.3–1)
MONOCYTES NFR BLD: 11.2 % (ref 4–15)
NEUTROPHILS # BLD AUTO: 6.3 K/UL (ref 1.8–7.7)
NEUTROPHILS NFR BLD: 70.9 % (ref 38–73)
NRBC BLD-RTO: 0 /100 WBC
PLATELET # BLD AUTO: 258 K/UL (ref 150–450)
PMV BLD AUTO: 9.1 FL (ref 9.2–12.9)
POTASSIUM SERPL-SCNC: 3.7 MMOL/L (ref 3.5–5.1)
PROT SERPL-MCNC: 6.8 G/DL (ref 6–8.4)
RBC # BLD AUTO: 4.48 M/UL (ref 4–5.4)
SODIUM SERPL-SCNC: 140 MMOL/L (ref 136–145)
TROPONIN I SERPL DL<=0.01 NG/ML-MCNC: 0.01 NG/ML (ref 0–0.03)
WBC # BLD AUTO: 8.84 K/UL (ref 3.9–12.7)

## 2023-10-29 PROCEDURE — 36415 COLL VENOUS BLD VENIPUNCTURE: CPT | Performed by: EMERGENCY MEDICINE

## 2023-10-29 PROCEDURE — 80053 COMPREHEN METABOLIC PANEL: CPT | Performed by: EMERGENCY MEDICINE

## 2023-10-29 PROCEDURE — 83880 ASSAY OF NATRIURETIC PEPTIDE: CPT | Performed by: EMERGENCY MEDICINE

## 2023-10-29 PROCEDURE — 84484 ASSAY OF TROPONIN QUANT: CPT | Performed by: EMERGENCY MEDICINE

## 2023-10-29 PROCEDURE — 99284 EMERGENCY DEPT VISIT MOD MDM: CPT | Mod: 25

## 2023-10-29 PROCEDURE — 85025 COMPLETE CBC W/AUTO DIFF WBC: CPT | Performed by: EMERGENCY MEDICINE

## 2023-10-29 NOTE — ED PROVIDER NOTES
Encounter Date: 10/29/2023       History     Chief Complaint   Patient presents with    Cough    Shortness of Breath     [age] Year old female with a medical history of anxiety, depression, DVT presents to the ER with multiple complaints.  Patient was discharged from the hospital recently for facial numbness.  She is complaining of a subjective sensation that her throat is closing.  She is also complaining of tingling in her fingers and toes.  She is also complaining of feeling anxious and short of breath.  Also cough but no chest heaviness or pressure.        Review of patient's allergies indicates:   Allergen Reactions    Gabapentin Hallucinations    Xarelto [rivaroxaban] Rash    Bactrim [sulfamethoxazole-trimethoprim] Other (See Comments)     Upset stomach, dry heaves, confusion    Afrin (pseudoephedrine)     Amoxicillin-pot clavulanate      Other reaction(s): Mental Status Change    Atorvastatin      Other reaction(s): Joint pain    Baclofen     Baclofen (bulk) Nausea And Vomiting    Ciprofloxacin     Decongest tabs      Other reaction(s): increased heart rate    Decongestant [pseudoephedrine hcl]     Erythromycin Other (See Comments)    Flecainide Hives     And SOB. No reaction to Lidocaine     Fluoxetine      Other reaction(s): heart palpitations  Other reaction(s): anxiety    Lisinopril Other (See Comments)     cough    Losartan Other (See Comments)     Hypotension with lightheadedness    Morphine Other (See Comments)     confusion    Tramadol Other (See Comments)     SOB, low BP    Venlafaxine     Venlafaxine analogues      Changes in BP and increases heart rate       Afrin [oxymetazoline] Palpitations    Caffeine Palpitations    Dabigatran etexilate Rash    Tizanidine Anxiety     dizziness     Past Medical History:   Diagnosis Date    Anticoagulant long-term use     Anxiety     Arthritis     Atrial fibrillation     Basal cell carcinoma     Cancer     skin    CHF (congestive heart failure)     Depression      DVT (deep venous thrombosis)     GERD (gastroesophageal reflux disease)     Glaucoma (increased eye pressure)     Hyperlipidemia     diet controlled    Hypertension     Interstitial lung disease     Localized hives 01/10/2020    Mild persistent asthma without complication 11/12/2018    Pacemaker     Pneumonia 01/31/2014    Stroke 06/03/2014    Stroke     TIA (transient ischemic attack)     TIA (transient ischemic attack)      Past Surgical History:   Procedure Laterality Date    2 heart ablations      3 in total    A-V CARDIAC PACEMAKER INSERTION Left 10/14/2021    Procedure: INSERTION, CARDIAC PACEMAKER, DUAL CHAMBER;  Surgeon: Fco Calderon MD;  Location: [facility] EP LAB;  Service: Cardiology;  Laterality: Left;  PAF/ Clever Arrthymias, Dual PPM, SJM, MAC, GP, 3 PREP    ANTERIOR VITRECTOMY Right 7/27/2022    Procedure: VITRECTOMY, ANTERIOR APPROACH;  Surgeon: Daniel Tan MD;  Location: [facility] OR;  Service: Ophthalmology;  Laterality: Right;    bilateral cataracts      CHOLECYSTECTOMY      COLONOSCOPY N/A 1/19/2017    Procedure: COLONOSCOPY and EGD;  Surgeon: Jonathan Schultz MD;  Location: [facility];  Service: Endoscopy;  Laterality: N/A;    COLONOSCOPY N/A 10/10/2019    Procedure: COLONOSCOPY;  Surgeon: Alex Christian MD;  Location: [facility];  Service: Endoscopy;  Laterality: N/A;    ESOPHAGOGASTRODUODENOSCOPY N/A 10/14/2019    Procedure: EGD (ESOPHAGOGASTRODUODENOSCOPY);  Surgeon: Alex Christian MD;  Location: [facility] ENDO;  Service: Endoscopy;  Laterality: N/A;    INJECTION OF ANESTHETIC AGENT AROUND MEDIAL BRANCH NERVES INNERVATING CERVICAL FACET JOINT Right 6/7/2018    Procedure: BLOCK, NERVE, FACET JOINT, MEDIAL BRANCH, CERVICAL;  Surgeon: Galo Clancy MD;  Location: [facility] OR;  Service: Pain Management;  Laterality: Right;  C4, 5, 6    OPEN REDUCTION AND INTERNAL FIXATION (ORIF) OF INJURY OF ANKLE Right 11/1/2018    Procedure: ORIF, ANKLE;  Surgeon: Wilfredo Aguero MD;  Location: [facility];  Service:  Orthopedics;  Laterality: Right;    PARACENTESIS, EYE, ANTERIOR CHAMBER, WITH AQUEOUS REMOVAL Right 7/27/2022    Procedure: Anterior chamber washout, possible IOL removal;  Surgeon: Daniel Tan MD;  Location: [facility] OR;  Service: Ophthalmology;  Laterality: Right;    pyloristenosis      RADIOFREQUENCY ABLATION OF LUMBAR MEDIAL BRANCH NERVE AT SINGLE LEVEL Bilateral 9/21/2018    Procedure: RADIOFREQUENCY ABLATION, NERVE, SPINAL, LUMBAR, MEDIAL BRANCH, 1 LEVEL;  Surgeon: Galo Clancy MD;  Location: [facility] OR;  Service: Pain Management;  Laterality: Bilateral;  L3, 4, 5    RADIOFREQUENCY ABLATION OF LUMBAR MEDIAL BRANCH NERVE AT SINGLE LEVEL Bilateral 2/19/2019    Procedure: Radiofrequency Ablation, Nerve, Spinal, Lumbar, Medial Branch, 1 Level;  Surgeon: Galo Clancy MD;  Location: [facility] OR;  Service: Pain Management;  Laterality: Bilateral;  L3, 4, 5     RADIOFREQUENCY ABLATION OF LUMBAR MEDIAL BRANCH NERVE AT SINGLE LEVEL Bilateral 3/10/2020    Procedure: Radiofrequency Ablation, Nerve, Spinal, Lumbar, Medial Branch, 1 Level;  Surgeon: Galo Clancy MD;  Location: [facility] OR;  Service: Pain Management;  Laterality: Bilateral;  L3,4,5 - Burned at 80 degrees C. for 60 seconds x 2 each site      RADIOFREQUENCY ABLATION OF LUMBAR MEDIAL BRANCH NERVE AT SINGLE LEVEL Bilateral 9/11/2020    Procedure: Radiofrequency Ablation, Nerve, Spinal, Lumbar, Medial Branch, 1 Level;  Surgeon: Galo Clancy MD;  Location: [facility] OR;  Service: Pain Management;  Laterality: Bilateral;  L3, 4, 5 - Burned at 80 degrees C. for 60 seconds x 2 each site    RADIOFREQUENCY ABLATION OF LUMBAR MEDIAL BRANCH NERVE AT SINGLE LEVEL Bilateral 5/28/2021    Procedure: Radiofrequency Ablation, Nerve, Spinal, Lumbar, Medial Branch, 1 Level;  Surgeon: Galo Clancy MD;  Location: [facility] OR;  Service: Pain Management;  Laterality: Bilateral;  L3,4,5    RADIOFREQUENCY THERMAL COAGULATION OF MEDIAL BRANCH OF POSTERIOR RAMUS OF CERVICAL SPINAL NERVE Right 7/3/2018     Procedure: RADIOFREQUENCY THERMAL COAGULATION, NERVE, SPINAL, CERVICAL, MEDIAL BRANCH OF POSTERIOR RAMUS;  Surgeon: Galo Clancy MD;  Location: [facility] OR;  Service: Pain Management;  Laterality: Right;  C4,5,6 - Burned at 80 degrees C. for 75 seconds each site    RADIOFREQUENCY THERMAL COAGULATION OF MEDIAL BRANCH OF POSTERIOR RAMUS OF CERVICAL SPINAL NERVE Right 7/23/2019    Procedure: RADIOFREQUENCY THERMAL COAGULATION, NERVE, SPINAL, CERVICAL, POSTERIOR RAMUS, MEDIAL BRANCH;  Surgeon: Galo Clancy MD;  Location: [facility] OR;  Service: Pain Management;  Laterality: Right;  C4,5,6    RADIOFREQUENCY THERMAL COAGULATION OF MEDIAL BRANCH OF POSTERIOR RAMUS OF CERVICAL SPINAL NERVE Right 6/23/2020    Procedure: RADIOFREQUENCY THERMAL COAGULATION, NERVE, SPINAL, CERVICAL, POSTERIOR RAMUS, MEDIAL BRANCH;  Surgeon: Galo Clancy MD;  Location: [facility] OR;  Service: Pain Management;  Laterality: Right;  C4, 5, 6    RADIOFREQUENCY THERMOCOAGULATION Bilateral 9/10/2019    Procedure: RADIOFREQUENCY THERMAL COAGULATION LUMBAR;  Surgeon: Galo Clancy MD;  Location: [facility] OR;  Service: Pain Management;  Laterality: Bilateral;  L3,4,5 - Burned at 80 degrees C. for 60 seconds x 2 each site    skin cancer removal       TONSILLECTOMY       Family History   Problem Relation Age of Onset    Arthritis Mother     Asthma Mother     Rheum arthritis Mother     Pneumonia Mother     Skin cancer Mother         unsure of type    Heart disease Father     Ulcers Father     Alzheimer's disease Maternal Uncle     Rheum arthritis Maternal Grandmother     Emphysema Maternal Grandfather     Cancer Maternal Grandfather         kidney    Kidney disease Maternal Grandfather     Cancer Paternal Grandmother         lung= smoker    Pneumonia Paternal Grandfather     Depression Son     Breast cancer Neg Hx     Ovarian cancer Neg Hx      Social History     Tobacco Use    Smoking status: Never    Smokeless tobacco: Never   Substance Use Topics    Alcohol use: No    Drug  "use: No     Review of Systems   Constitutional: Negative.    HENT:          "Throat closing"   Respiratory:  Positive for shortness of breath.    Cardiovascular:  Negative for chest pain.   Gastrointestinal:  Negative for abdominal pain.   Psychiatric/Behavioral:  The patient is nervous/anxious.        Physical Exam     Initial Vitals [10/29/23 0550]   BP Pulse Resp Temp SpO2   (!) 152/73 79 20 97.5 °F (36.4 °C) 100 %      MAP       --         Physical Exam    Nursing note and vitals reviewed.  Constitutional: She appears well-developed and well-nourished.   HENT:   Head: Normocephalic and atraumatic.   Mouth/Throat: No oropharyngeal exudate, posterior oropharyngeal edema or posterior oropharyngeal erythema.   Airway is widely patent   Eyes: EOM are normal.   Neck: Neck supple.   Normal range of motion.  Cardiovascular:  Normal rate, regular rhythm and normal heart sounds.     Exam reveals no gallop and no friction rub.       No murmur heard.  Pulmonary/Chest: Breath sounds normal. No respiratory distress. She has no wheezes. She has no rhonchi. She has no rales.   Abdominal: Abdomen is soft. She exhibits no distension. There is no abdominal tenderness.   Musculoskeletal:         General: Normal range of motion.      Cervical back: Normal range of motion and neck supple.     Neurological: She is alert.   No focal weakness   Skin: Skin is warm and dry.   Psychiatric: Judgment normal. Her mood appears anxious. Her affect is blunt.   Flat affect, anxious         ED Course   Procedures  Labs Reviewed   CBC W/ AUTO DIFFERENTIAL - Abnormal; Notable for the following components:       Result Value    MCH 31.9 (*)     MPV 9.1 (*)     Lymph % 14.1 (*)     All other components within normal limits   COMPREHENSIVE METABOLIC PANEL - Abnormal; Notable for the following components:    CO2 19 (*)     All other components within normal limits   TROPONIN I   B-TYPE NATRIURETIC PEPTIDE          Imaging Results              X-Ray " Chest AP Portable (Final result)  Result time 10/29/23 08:36:28      Final result by Ruby Crawford MD (10/29/23 08:36:28)                   Impression:      No acute cardiopulmonary disease.      Electronically signed by: Ruby Crawford MD  Date:    10/29/2023  Time:    08:36               Narrative:    EXAMINATION:  XR CHEST AP PORTABLE    CLINICAL HISTORY:  Chest Pain;    TECHNIQUE:  Single frontal view of the chest was performed.    COMPARISON:  10/25/2023    FINDINGS:  Stable cardiomediastinal silhouette anterior chest wall cardiac pacemaker with multiple pacing leads remain in place.  The lungs appear clear of infiltrate and there is no pleural effusion.  No significant change                                       Medications - No data to display  Medical Decision Making  78-year-old female with multiple medical problems presents to the ER with a subjective sensation that her throat is closing.  Also feels tingling in her extremities and shortness of breath.  Clinical presentation is consistent with anxiety in my opinion.  Signed over at 7:00 a.m. to Dr. Wynn to follow-up remaining studies.  Patient is in no distress in the ER I doubt any serious cause of her symptoms.  Patient is on anticoagulation making something such as pulmonary embolus very unlikely.    Patient care transferred to Dr Wynn at shift change. I discussed the relevant history, physical exam, laboratory findings, radiological findings and anticipated disposition plan. On coming staff to formally complete visit disposition, review any pending laboratory/radiological results and to addend treatment plan as necessary.        Amount and/or Complexity of Data Reviewed  Labs: ordered. Decision-making details documented in ED Course.  Radiology: ordered.               ED Course as of 10/29/23 1906   Sun Oct 29, 2023   0553 SpO2: 100 % [EF]   0553 Resp: 20 [EF]   0553 Pulse: 79 [EF]   0553 Temp Source: Oral [EF]   0553 Temp: 97.5 °F (36.4 °C)  [EF]   0553 BP(!): 152/73 [EF]   0600 Sinus rhythm 72 beats per minute left axis no ST elevation or depression LVH independently interpreted [EF]   0618 Chest x-ray demonstrates pulmonary hyperinflation, pacemaker.  No pneumothorax no infiltrate no evidence of fluid overload.  My read. [EF]   0649 WBC: 8.84 [EF]   0649 Hemoglobin: 14.3 [EF]   0649 Platelet Count: 258 [EF]   0850 The patient was handed over to me at the change of shift by Dr. Wolff pending her labs and chest x-ray results.  The patient's labs and chest x-ray showed no acute abnormalities.  I am unclear as to the etiology of her dyspnea, but it is resolved and she is saturating well here in the emergency department.  It may be related to her anxiety.  The patient is stable for discharge home.  She is to follow-up with primary care and Neurology as scheduled as an outpatient. [PE]      ED Course User Index  [EF] Julius Wolff MD  [PE] Lenin Wynn MD                    Clinical Impression:   Final diagnoses:  [R06.00] Dyspnea, unspecified type (Primary)        ED Disposition Condition    Discharge Stable          ED Prescriptions    None       Follow-up Information       Follow up With Specialties Details Why Contact Info    Jan Olivo MD Family Medicine Schedule an appointment as soon as possible for a visit   [address]  [place] [address]  [phone]               Julius Wolff MD  10/29/23 1906       Julius Wolff MD  10/29/23 1907

## 2023-10-30 ENCOUNTER — PATIENT OUTREACH (OUTPATIENT)
Dept: EMERGENCY MEDICINE | Facility: HOSPITAL | Age: 78
End: 2023-10-30

## 2023-10-30 ENCOUNTER — PATIENT OUTREACH (OUTPATIENT)
Dept: ADMINISTRATIVE | Facility: CLINIC | Age: 78
End: 2023-10-30
Payer: MEDICARE

## 2023-10-30 NOTE — PROGRESS NOTES
C3 nurse attempted to contact Ayla Dela Cruz for a TCC post hospital discharge follow up call. No answer. Left voicemail with callback information. The patient has a scheduled HOSFU appointment with [facility] on 11/06/23 @ 6418.

## 2023-10-30 NOTE — PROGRESS NOTES
Pt is already scheduled with [facility] for 11/6/2023 at 1:30 p.m. Pt will be reminded on 11/5.    Aparna Cha  ED Navigator  [phone]

## 2023-10-31 NOTE — PROGRESS NOTES
C3 nurse spoke with Ayla Luigi Dela Cruz's  Jan for a TCC post hospital discharge follow up call. The patient has a scheduled HOSFU appointment with [facility] on 11/06/23 @ 9027.

## 2023-11-01 ENCOUNTER — PATIENT MESSAGE (OUTPATIENT)
Dept: FAMILY MEDICINE | Facility: CLINIC | Age: 78
End: 2023-11-01
Payer: MEDICARE

## 2023-11-01 DIAGNOSIS — I48.0 PAROXYSMAL ATRIAL FIBRILLATION: Primary | ICD-10-CM

## 2023-11-01 DIAGNOSIS — Z95.0 CARDIAC PACEMAKER IN SITU: ICD-10-CM

## 2023-11-01 NOTE — TELEPHONE ENCOUNTER
Usually the neurologist orders their own MRI?    Cardiology referral order is in.  Dr. Ariel Farrell is usually my favorite cardiologist, Torito Farrell, Dr. Patel, and Dr. Chacon are also very good and I have worked with all of them.

## 2023-11-03 ENCOUNTER — TELEPHONE (OUTPATIENT)
Dept: FAMILY MEDICINE | Facility: CLINIC | Age: 78
End: 2023-11-03
Payer: MEDICARE

## 2023-11-03 ENCOUNTER — TELEPHONE (OUTPATIENT)
Dept: CARDIOLOGY | Facility: CLINIC | Age: 78
End: 2023-11-03
Payer: MEDICARE

## 2023-11-03 NOTE — TELEPHONE ENCOUNTER
I called the patient in regards to her appointment that she has at the discharge clinic on Monday November 6 at 1:30pm. No answer left voicemail to return our call. I will send the patient a portal message

## 2023-11-06 ENCOUNTER — OFFICE VISIT (OUTPATIENT)
Dept: PRIMARY CARE CLINIC | Facility: CLINIC | Age: 78
End: 2023-11-06
Payer: MEDICARE

## 2023-11-06 VITALS
TEMPERATURE: 98 F | HEIGHT: 67 IN | HEART RATE: 64 BPM | SYSTOLIC BLOOD PRESSURE: 110 MMHG | BODY MASS INDEX: 16.63 KG/M2 | WEIGHT: 105.94 LBS | OXYGEN SATURATION: 98 % | DIASTOLIC BLOOD PRESSURE: 60 MMHG

## 2023-11-06 DIAGNOSIS — R09.81 NASAL CONGESTION: ICD-10-CM

## 2023-11-06 DIAGNOSIS — G45.9 TIA (TRANSIENT ISCHEMIC ATTACK): Primary | ICD-10-CM

## 2023-11-06 PROCEDURE — 1160F PR REVIEW ALL MEDS BY PRESCRIBER/CLIN PHARMACIST DOCUMENTED: ICD-10-PCS | Mod: CPTII,S$GLB,, | Performed by: NURSE PRACTITIONER

## 2023-11-06 PROCEDURE — 3288F FALL RISK ASSESSMENT DOCD: CPT | Mod: CPTII,S$GLB,, | Performed by: NURSE PRACTITIONER

## 2023-11-06 PROCEDURE — 99999 PR PBB SHADOW E&M-EST. PATIENT-LVL IV: ICD-10-PCS | Mod: PBBFAC,,, | Performed by: NURSE PRACTITIONER

## 2023-11-06 PROCEDURE — 3288F PR FALLS RISK ASSESSMENT DOCUMENTED: ICD-10-PCS | Mod: CPTII,S$GLB,, | Performed by: NURSE PRACTITIONER

## 2023-11-06 PROCEDURE — 1101F PT FALLS ASSESS-DOCD LE1/YR: CPT | Mod: CPTII,S$GLB,, | Performed by: NURSE PRACTITIONER

## 2023-11-06 PROCEDURE — 1159F PR MEDICATION LIST DOCUMENTED IN MEDICAL RECORD: ICD-10-PCS | Mod: CPTII,S$GLB,, | Performed by: NURSE PRACTITIONER

## 2023-11-06 PROCEDURE — 99999 PR PBB SHADOW E&M-EST. PATIENT-LVL IV: CPT | Mod: PBBFAC,,, | Performed by: NURSE PRACTITIONER

## 2023-11-06 PROCEDURE — 3074F PR MOST RECENT SYSTOLIC BLOOD PRESSURE < 130 MM HG: ICD-10-PCS | Mod: CPTII,S$GLB,, | Performed by: NURSE PRACTITIONER

## 2023-11-06 PROCEDURE — 99213 PR OFFICE/OUTPT VISIT, EST, LEVL III, 20-29 MIN: ICD-10-PCS | Mod: S$GLB,,, | Performed by: NURSE PRACTITIONER

## 2023-11-06 PROCEDURE — 1157F PR ADVANCE CARE PLAN OR EQUIV PRESENT IN MEDICAL RECORD: ICD-10-PCS | Mod: CPTII,S$GLB,, | Performed by: NURSE PRACTITIONER

## 2023-11-06 PROCEDURE — 3074F SYST BP LT 130 MM HG: CPT | Mod: CPTII,S$GLB,, | Performed by: NURSE PRACTITIONER

## 2023-11-06 PROCEDURE — 1160F RVW MEDS BY RX/DR IN RCRD: CPT | Mod: CPTII,S$GLB,, | Performed by: NURSE PRACTITIONER

## 2023-11-06 PROCEDURE — 1126F PR PAIN SEVERITY QUANTIFIED, NO PAIN PRESENT: ICD-10-PCS | Mod: CPTII,S$GLB,, | Performed by: NURSE PRACTITIONER

## 2023-11-06 PROCEDURE — 1157F ADVNC CARE PLAN IN RCRD: CPT | Mod: CPTII,S$GLB,, | Performed by: NURSE PRACTITIONER

## 2023-11-06 PROCEDURE — 1159F MED LIST DOCD IN RCRD: CPT | Mod: CPTII,S$GLB,, | Performed by: NURSE PRACTITIONER

## 2023-11-06 PROCEDURE — 1126F AMNT PAIN NOTED NONE PRSNT: CPT | Mod: CPTII,S$GLB,, | Performed by: NURSE PRACTITIONER

## 2023-11-06 PROCEDURE — 1101F PR PT FALLS ASSESS DOC 0-1 FALLS W/OUT INJ PAST YR: ICD-10-PCS | Mod: CPTII,S$GLB,, | Performed by: NURSE PRACTITIONER

## 2023-11-06 PROCEDURE — 99213 OFFICE O/P EST LOW 20 MIN: CPT | Mod: S$GLB,,, | Performed by: NURSE PRACTITIONER

## 2023-11-06 PROCEDURE — 3078F DIAST BP <80 MM HG: CPT | Mod: CPTII,S$GLB,, | Performed by: NURSE PRACTITIONER

## 2023-11-06 PROCEDURE — 3078F PR MOST RECENT DIASTOLIC BLOOD PRESSURE < 80 MM HG: ICD-10-PCS | Mod: CPTII,S$GLB,, | Performed by: NURSE PRACTITIONER

## 2023-11-06 RX ORDER — CLOPIDOGREL BISULFATE 75 MG/1
75 TABLET ORAL
COMMUNITY
Start: 2023-10-31 | End: 2023-12-13

## 2023-11-06 NOTE — PROGRESS NOTES
Transitional Care Note  Subjective:       Patient ID: Ayla Dela Cruz is a 78 y.o. female.  Chief Complaint: Transitional Care    Family and/or Caretaker present at visit?  Yes.  Diagnostic tests reviewed/disposition: No diagnosic tests pending after this hospitalization.  Disease/illness education: avoiding decongestants  Home health/community services discussion/referrals: Patient does not have home health established from hospital visit.  They do not need home health.  If needed, we will set up home health for the patient.   Establishment or re-establishment of referral orders for community resources: No other necessary community resources.   Discussion with other health care providers: No discussion with other health care providers necessary.    Ayla Dela Cruz is an 78-year-old  female with past medical history significant for chronic atrial fibrillation (on Eliquis), status post pacemaker placement, prior history of cerebrovascular accident with no residual neuro deficits, history of congestive heart failure, prior history of TIA and history of deep venous thrombosis who presents today for hospital follow up. Patient was recently admitted for facial numbness and found to have TIA. Patient states she had no further episodes of numbness or unilateral weakness, changes in speech, vision or mental status.  Patient is complaining of congestion that she states has been going on for several weeks.  Patient was recently seen by Dr. Brambila, neurology, who referred her to ENT.  Patient denies fever, cough or sinus tenderness with nasal congestion.  Patient has tried OTC Afrin with some relief.      Review of Systems   Constitutional:  Negative for activity change, appetite change, chills and fever.   HENT:  Positive for congestion, postnasal drip and rhinorrhea. Negative for sore throat and trouble swallowing.    Eyes:  Negative for photophobia and visual disturbance.   Respiratory:   Negative for cough, chest tightness and shortness of breath.    Cardiovascular:  Negative for chest pain, palpitations and leg swelling.   Gastrointestinal:  Negative for abdominal pain, diarrhea and nausea.   Genitourinary:  Negative for dysuria, flank pain and hematuria.   Musculoskeletal:  Negative for back pain.   Neurological:  Negative for dizziness, weakness and headaches.   Psychiatric/Behavioral:  Negative for confusion.        Objective:      Physical Exam  Vitals reviewed.   Constitutional:       Appearance: Normal appearance. She is underweight.      Comments: Frail appearing   HENT:      Head: Normocephalic.      Nose: Congestion and rhinorrhea present.      Mouth/Throat:      Mouth: Mucous membranes are moist.      Pharynx: Oropharynx is clear.   Eyes:      Pupils: Pupils are equal, round, and reactive to light.   Cardiovascular:      Rate and Rhythm: Normal rate and regular rhythm.      Pulses: Normal pulses.      Heart sounds: Normal heart sounds.   Pulmonary:      Effort: Pulmonary effort is normal.      Breath sounds: Normal breath sounds.   Abdominal:      General: Bowel sounds are normal.      Palpations: Abdomen is soft.   Musculoskeletal:         General: Normal range of motion.      Cervical back: Normal range of motion.   Skin:     General: Skin is warm and dry.   Neurological:      Mental Status: She is alert and oriented to person, place, and time. Mental status is at baseline.   Psychiatric:         Mood and Affect: Mood normal.         Assessment:       1. TIA (transient ischemic attack)    2. Nasal congestion        Plan:       TIA    -MRI pending due to pacemaker. MRI waiting on pacemaker rep   -Follow up with Dr. Weller as scheduled following MRI    Nasal Congestion    -Follow up with ENT as scheduled    -Avoid decongestant with pseudoephedrine    -Try Flonase and antihistamine like Claritin/Allegra  for nasal congestion

## 2023-11-09 ENCOUNTER — HOSPITAL ENCOUNTER (OUTPATIENT)
Dept: RADIOLOGY | Facility: HOSPITAL | Age: 78
Discharge: HOME | End: 2023-11-09
Attending: STUDENT IN AN ORGANIZED HEALTH CARE EDUCATION/TRAINING PROGRAM
Payer: MEDICARE

## 2023-11-09 DIAGNOSIS — R04.0 EPISTAXIS: ICD-10-CM

## 2023-11-09 DIAGNOSIS — I77.3: ICD-10-CM

## 2023-11-09 DIAGNOSIS — Z95.0 CARDIAC PACEMAKER: ICD-10-CM

## 2023-11-09 DIAGNOSIS — G45.9 TIA (TRANSIENT ISCHEMIC ATTACK): ICD-10-CM

## 2023-11-09 DIAGNOSIS — I48.0 PAROXYSMAL ATRIAL FIBRILLATION: ICD-10-CM

## 2023-11-09 PROCEDURE — 70551 MRI BRAIN STEM W/O DYE: CPT | Mod: TC

## 2023-11-14 NOTE — PROGRESS NOTES
[facility] ENCOUNTER   PODIATRY PATIENT NOTE    REQUESTING PROVIDER: Liz Cote,*    CHIEF COMPLAINT:   Chief Complaint   Patient presents with   • Office Visit     New patient here today for R foot bunion.       SUBJECTIVE:  HISTORY OF PRESENT ILLNESS:  Vincent Torres is a 60 year old male , new patient, seen today for chronic problem with exacerbation:  Patient has bilateral bunion deformities.  Patient states in the past month his right great toe joint has become increasingly painful.  Patient points to the bunion bump itself.  Patient also has pain with movement of the great toe joint up and down.  Patient wears stylish dress shoes to work and is on his feet all day.  Patient relates in the last month he has purchased a new pair shoes with a pointed toe.  He had them stretched to alleviate pressure on the right bunion.  The patient states his pain is persisting.  He describes the pain as a sharp pain with shoe pressure and, dull achy pain at times at rest.  At its worst patient states his pain can be 8/10.  Patient is interested in foot x-rays and possible surgical consultation.    REVIEW OF SYSTEMS:  Constitutional: Negative for fever and chills.  Skin: Negative for rash.  Vascular: No open sores to feet, no redness  Neurologic: Feet were negative for abnormality in sensory or motor function.  Patient denies episodes of burning tingling numbness to the feet.  Endocrine: Negative for diabetes  Extremities:  Edema: none.  Tenderness:  Right great toe joint    HISTORIES:  I have reviewed the past medical history, family history, social history, medications and allergies listed in the medical record as well as the nursing notes for this encounter.    OBJECTIVE  PHYSICAL EXAM:  There were no vitals taken for this visit.  General:  No apparent distress. Alert and oriented.    Neurological:  Protective sensation: Intact to light touch bilaterally. No burning, tingling, numbness. Patient did pass  Pre-Visit Chart Review  For Appointment Scheduled on (1/30/20)    Health Maintenance Due   Topic Date Due    TETANUS VACCINE  05/04/2016                     [place] [employer] 5.07 monofilament wire exam to detect protective sensorium to 5/5 test sites both feet.  Normal tone bilateral lower extremities.    Vascular:  Pulses: Right  Dorsalis Pedis 3/4 and Left dorsalis Pedis pulses 3/4.  Right posterior tibial pulse 2/4 and  Left Posterior Tibial pulse 2/4  Capillary refill < three seconds bilaterally in digits 1-5  No edema of bilateral feet.  Hair growth present at the level of the digits.    Dermatologic:  Skin: Clean, dry and intact bilaterally.   Skin texture to the bilateral lower extremities is WNL, no ecchymosis or erythema, no rash to either foot or ankle.  There are no open sores to bilateral feet.  Nails all toes, both feet are WNL.    Musculoskeletal:  Exam:Patient presents with bilateral hallux valgus deformity with lateral deviation of great toe off 1st metatarsal head pressing into 2nd toes  There is painful range of motion  both dorsiflexion and plantar flexion right 1st MTPJ only.  There is deep intra-articular joint pain with joint distraction right foot only.  There is no crepitus noted.  There is joint contracture at the 1st MTPJ with prominence of the medial eminence of the 1st metatarsal head bilateral with minor atrophy of the skin from persistent pressure of shoes bilateral.  There are no blisters, no ulcerations, no erythema noted, no bursa formations noted.    Foot X-ray Review:  Three views weight-bearing x-rays taken this date  X-rays were reviewed with the patient at the time of service awaiting official radiology impression, all questions were answered.  Foot x-rays show:  There is increased 1st IM angle to 14 degrees helder.  Patient has mild metatarsus adductus bilateral.  There is lateral deviation of great toe off 1st metatarsal head by 30%  There is minor asymmetric joint space narrowing at the level of the 1st MTPJ with prominence of the medial eminence.  There are squared off, cystic changes noted to the medial eminence.  There is lateral  deviation of the sesamoid apparatus into the 1st interspace bilateral.  There is no dorsal bone spurring noted to either 1st MTPJ.  There was a dorsal bone spurring nor the to the base of the 1st metatarsal at the 1st cuneiform articulation right foot only.  Patient has decreased calcaneal inclination angle noted bilateral.    ASSESSMENT:  1. Acquired hallux valgus, right    2. Acquired hallux valgus, left    3. Joint pain of ankle and foot, right        PLAN:     Orders Placed This Encounter   • XR FOOT STANDING 3 OR MORE VIEWS BILATERAL      New patient history and physical with review of systems and documentation of findings.    X-ray review:  New foot x-rays ordered this date:  New foot x-rays ordered this date three views weight-bearing bilateral were reviewed with the patient at the time of service and all questions were answered.    Patient I discussed her painful bunion deformity  I explained to the patient that conservative care for this problem would include oral anti-inflammatory medication, icing just proximal to the joint, change of shoe gear to prevent sharp, rubbing pressure onto the great toe joint.  Patient already purchase gel bunion shields over-the-counter to further protect against shoe pressure rubbing onto the joint.    Patient deferred a referral for surgical consultation at this time and is opting to try conservative care 1st.    Pain Management:  Patient deferred any oral anti-inflammatory medication.  At this time I did dispensed in the office visit and in his AVS treatment options for topical anti-inflammatory medications such as icy Hot, Voltaren gel, and Salonpas patches which can be worn up to 8-10 hour throughout sleep.    Return if symptoms worsen or fail to improve.

## 2023-11-20 ENCOUNTER — TELEPHONE (OUTPATIENT)
Dept: DERMATOLOGY | Facility: CLINIC | Age: 78
End: 2023-11-20
Payer: MEDICARE

## 2023-11-22 ENCOUNTER — OFFICE VISIT (OUTPATIENT)
Dept: PSYCHIATRY | Facility: CLINIC | Age: 78
End: 2023-11-22
Payer: MEDICARE

## 2023-11-22 VITALS
BODY MASS INDEX: 16.99 KG/M2 | HEIGHT: 67 IN | HEART RATE: 54 BPM | DIASTOLIC BLOOD PRESSURE: 64 MMHG | SYSTOLIC BLOOD PRESSURE: 113 MMHG | WEIGHT: 108.25 LBS

## 2023-11-22 DIAGNOSIS — F41.1 GENERALIZED ANXIETY DISORDER: ICD-10-CM

## 2023-11-22 DIAGNOSIS — F41.0 PANIC DISORDER: ICD-10-CM

## 2023-11-22 DIAGNOSIS — F33.1 MAJOR DEPRESSIVE DISORDER, RECURRENT, MODERATE: Primary | ICD-10-CM

## 2023-11-22 DIAGNOSIS — F40.298 SPECIFIC PHOBIA: ICD-10-CM

## 2023-11-22 PROCEDURE — 1126F PR PAIN SEVERITY QUANTIFIED, NO PAIN PRESENT: ICD-10-PCS | Mod: CPTII,S$GLB,, | Performed by: PSYCHOLOGIST

## 2023-11-22 PROCEDURE — 3288F FALL RISK ASSESSMENT DOCD: CPT | Mod: CPTII,S$GLB,, | Performed by: PSYCHOLOGIST

## 2023-11-22 PROCEDURE — 1101F PR PT FALLS ASSESS DOC 0-1 FALLS W/OUT INJ PAST YR: ICD-10-PCS | Mod: CPTII,S$GLB,, | Performed by: PSYCHOLOGIST

## 2023-11-22 PROCEDURE — 3078F PR MOST RECENT DIASTOLIC BLOOD PRESSURE < 80 MM HG: ICD-10-PCS | Mod: CPTII,S$GLB,, | Performed by: PSYCHOLOGIST

## 2023-11-22 PROCEDURE — 90833 PR PSYCHOTHERAPY W/PATIENT W/E&M, 30 MIN (ADD ON): ICD-10-PCS | Mod: S$GLB,,, | Performed by: PSYCHOLOGIST

## 2023-11-22 PROCEDURE — 1126F AMNT PAIN NOTED NONE PRSNT: CPT | Mod: CPTII,S$GLB,, | Performed by: PSYCHOLOGIST

## 2023-11-22 PROCEDURE — 3074F PR MOST RECENT SYSTOLIC BLOOD PRESSURE < 130 MM HG: ICD-10-PCS | Mod: CPTII,S$GLB,, | Performed by: PSYCHOLOGIST

## 2023-11-22 PROCEDURE — 99999 PR PBB SHADOW E&M-EST. PATIENT-LVL III: CPT | Mod: PBBFAC,,, | Performed by: PSYCHOLOGIST

## 2023-11-22 PROCEDURE — 3074F SYST BP LT 130 MM HG: CPT | Mod: CPTII,S$GLB,, | Performed by: PSYCHOLOGIST

## 2023-11-22 PROCEDURE — 1157F ADVNC CARE PLAN IN RCRD: CPT | Mod: CPTII,S$GLB,, | Performed by: PSYCHOLOGIST

## 2023-11-22 PROCEDURE — 1159F MED LIST DOCD IN RCRD: CPT | Mod: CPTII,S$GLB,, | Performed by: PSYCHOLOGIST

## 2023-11-22 PROCEDURE — 99999 PR PBB SHADOW E&M-EST. PATIENT-LVL III: ICD-10-PCS | Mod: PBBFAC,,, | Performed by: PSYCHOLOGIST

## 2023-11-22 PROCEDURE — 90833 PSYTX W PT W E/M 30 MIN: CPT | Mod: S$GLB,,, | Performed by: PSYCHOLOGIST

## 2023-11-22 PROCEDURE — 99214 OFFICE O/P EST MOD 30 MIN: CPT | Mod: S$GLB,,, | Performed by: PSYCHOLOGIST

## 2023-11-22 PROCEDURE — 1159F PR MEDICATION LIST DOCUMENTED IN MEDICAL RECORD: ICD-10-PCS | Mod: CPTII,S$GLB,, | Performed by: PSYCHOLOGIST

## 2023-11-22 PROCEDURE — 99214 PR OFFICE/OUTPT VISIT, EST, LEVL IV, 30-39 MIN: ICD-10-PCS | Mod: S$GLB,,, | Performed by: PSYCHOLOGIST

## 2023-11-22 PROCEDURE — 3078F DIAST BP <80 MM HG: CPT | Mod: CPTII,S$GLB,, | Performed by: PSYCHOLOGIST

## 2023-11-22 PROCEDURE — 3288F PR FALLS RISK ASSESSMENT DOCUMENTED: ICD-10-PCS | Mod: CPTII,S$GLB,, | Performed by: PSYCHOLOGIST

## 2023-11-22 PROCEDURE — 1101F PT FALLS ASSESS-DOCD LE1/YR: CPT | Mod: CPTII,S$GLB,, | Performed by: PSYCHOLOGIST

## 2023-11-22 PROCEDURE — 1157F PR ADVANCE CARE PLAN OR EQUIV PRESENT IN MEDICAL RECORD: ICD-10-PCS | Mod: CPTII,S$GLB,, | Performed by: PSYCHOLOGIST

## 2023-11-22 RX ORDER — ASPIRIN 325 MG
325 TABLET, DELAYED RELEASE (ENTERIC COATED) ORAL DAILY
COMMUNITY
End: 2023-12-13

## 2023-11-22 RX ORDER — LORAZEPAM 0.5 MG/1
0.5 TABLET ORAL 3 TIMES DAILY PRN
Qty: 90 TABLET | Refills: 0 | Status: SHIPPED | OUTPATIENT
Start: 2023-11-22 | End: 2024-01-17 | Stop reason: SDUPTHER

## 2023-11-22 NOTE — PROGRESS NOTES
Outpatient Psychiatry Follow-Up Visit    Clinical Status of Patient: Outpatient (Ambulatory)  11/21/2023     Chief Complaint: 78 y/o female presenting today with  for a follow-up.       Interval History and Content of Current Session:  Interim Events/Subjective Report/Content of Current Session: 78 y/o female follow-up appointment.    Pt is a 78 y/o female with past psychiatric hx of depression, anxiety, eating disorder who presents for follow-up treatment. Pt reported some increase in anxiety and difficulty with motivation. Stated that she has been having some difficulty with CPAP mask. Noted having some numbness around her face and went to ED. MRI performed a few days later. Has not been able to f/u with neuro as of yet. Pt and  worried about a TIA.     Past Psychiatric hx: remeron, rexulti, vraylar, Limbitrol, venlafaxine, fluoxetine, gabapentin, amitriptyline, cannot remember others.     Past Medical hx:   Past Medical History:   Diagnosis Date    Anticoagulant long-term use     Anxiety     Arthritis     Atrial fibrillation     Basal cell carcinoma     Cancer     skin    CHF (congestive heart failure)     Depression     DVT (deep venous thrombosis)     GERD (gastroesophageal reflux disease)     Glaucoma (increased eye pressure)     Hyperlipidemia     diet controlled    Hypertension     Interstitial lung disease     Localized hives 01/10/2020    Mild persistent asthma without complication 11/12/2018    Pacemaker     Pneumonia 01/31/2014    Stroke 06/03/2014    Stroke     TIA (transient ischemic attack)     TIA (transient ischemic attack)         Interim hx:  Medication changes last visit: Return buspirone to 10 mg TID  Anxiety: decrease  Depression: decrease     Denies suicidal/homicidal ideations.  Denies hopelessness/worthlessness.    Denies auditory/visual hallucinations      Alcohol: Infrequent use  Drug: Pt denied  Caffeine: Not assessed  Tobacco: Pt denied      Review of Systems   PSYCHIATRIC:  Pertinent items are noted in the narrative.        CONSTITUTIONAL: weight stable    Past Medical, Family and Social History: The patient's past medical, family and social history have been reviewed and updated as appropriate within the electronic medical record. See encounter notes.     Current Psychiatric Medication:  ativan 0.5 mg qd PRN, buspirone 10 mg TID, trintillex 20 mg     Compliance: yes      Side effects: Pt denied     Risk Parameters:  Patient reports no suicidal ideation  Patient reports no homicidal ideation  Patient reports no self-injurious behavior  Patient reports no violent behavior     Exam (detailed: at least 9 elements; comprehensive: all 15 elements)   Constitutional  Vitals:  Most recent vital signs, dated less than 90 days prior to this appointment, were reviewed. Pulse:  [54]   BP: (113)/(64)       General:  unremarkable, age appropriate, casual attire, good eye contact, good rapport       Musculoskeletal  Muscle Strength/Tone:  no flaccidity, no tremor    Gait & Station:  normal      Psychiatric                       Speech:  normal tone, normal rate, rhythm, and volume   Mood & Affect:   Mildly Depressed, anxious         Thought Process:   Goal directed; Linear    Associations:   intact   Thought Content:   No SI/HI, delusions, or paranoia, no AV/VH   Insight & Judgement:   Good, adequate to circumstances   Orientation:   grossly intact; alert and oriented x 4    Memory:  intact for content of interview    Language:  grossly intact, can repeat    Attention Span  : Grossly intact for content of interview   Fund of Knowledge:   intact and appropriate to age and level of education        Assessment and Diagnosis   Status/Progress: Based on the examination today, the patient's problem(s) is/are adequately controlled.  New problems have not been presented today. Co-morbidities are not complicating management of the primary condition. There are no active rule-out diagnoses for this patient at  this time.      Impression: Pt reports an increase in anxiety, likely related to recent psychosocial and health stressors. Cognition, verbal responses, tracking conversations, and gait all continue to improve, however. Agreed to a short-term increase in lorazepam but highlighted that this is not the long-term plan. Will continue with medication plan save for above change and monitor symptoms moving forward.     Diagnosis:   1) Major Depressive Disorder, recurrent, moderate  2) Generalized Anxiety Disorder  3) Specific Phobia  4) Panic Disorder   5) Personality Disorder traits  6) R/o Dementia of unknown etiology  Intervention/Counseling/Treatment Plan   Medication Management:      1. Increase ativan 0.5 mg TID PRN    2. buspirone to 10 mg TID    3. trintillex to 20 mg    4. Call to report any worsening of symptoms or problems with the medication. Pt instructed to go to ER with thoughts of harming self, others     5. Cont in therapy with Graeme Meyer LCSW    Psychotherapy: 25 minutes  Target symptoms: anxiety  Why chosen therapy is appropriate versus another modality: CBT used; relevant to diagnosis, patient responds to this modality  Outcome monitoring methods: self-report, observation  Therapeutic intervention type: Cognitive Behavioral Therapy, Solution-focused  Topics discussed/themes: building skills sets for symptom management, symptom recognition, nutrition, exercise  The patient's response to the intervention is accepting  Patient's response to treatment is: good.   The patient's progress toward treatment goals: limited to fair     Return to clinic: 1 month    -Cognitive-Behavioral/Supportive therapy and psychoeducation provided  -R/B/SE's of medications discussed with the pt who expresses understanding and chooses to take medications as prescribed.   -Pt instructed to call clinic, 911 or go to nearest emergency room if sxs worsen or pt is in   crisis. The pt expresses understanding.    Galo Lipscomb, PhD,  MP     Antidepressant/Antianxiety Medication Initiation:  Patient informed of risks, benefits, and potential side effects of medication and accepts informed consent.  Common side effects include nausea, fatigue, headache, insomnia., Specifically discussed the possibility of new or worsening suicidal thoughts/depression.  Patient instructed to stop the medication immediately and seek urgent treatment if this occurs. Patient instructed not to abruptly discontinue medication without physician guidance except in cases of sudden onset or worsening of SI.       Stimulant Medication Initiation:  Patient advised of risks, benefits, and side effects of medication and accepts informed consent.  Common side effects include insomnia, irritability, jittery feeling, dry mouth, and agitation/hostility., Patient advised of potential addictive nature of medication and controlled substance classification.  Instructed to safeguard medication as no early refills can be given for lost or stolen medications.       Benzodiazepine Initiation:  Patient advised of the risks, benefits, and common side effects of medication and has accepted informed consent.  Common side effects include drowsiness, impaired coordination, possible memory loss., Patient advised NOT to operate a vehicle or machinery untiil they are sure how the medication will affec them.  Client also advised of danger of mixing this medication with alcohol., Patient advised of potential addictive nature of medication and need to safeguard medication as no early refills for lost or stolen medications can be authorized.

## 2023-11-24 NOTE — TELEPHONE ENCOUNTER
----- Message from Barrett Jurado MD sent at 1/16/2020 12:58 PM CST -----  Have iron deficiency, have placed order for Iron supplement once to twice daily on empty stomach to [facility] on [address]. Please notify patient, thanks, Dr. Jurado   n/a

## 2023-11-27 ENCOUNTER — OFFICE VISIT (OUTPATIENT)
Dept: OPHTHALMOLOGY | Facility: CLINIC | Age: 78
End: 2023-11-27
Payer: MEDICARE

## 2023-11-27 DIAGNOSIS — H40.41X0 UVEITIS-HYPHEMA-GLAUCOMA SYNDROME OF RIGHT EYE: ICD-10-CM

## 2023-11-27 DIAGNOSIS — T85.398A UVEITIS-HYPHEMA-GLAUCOMA SYNDROME OF RIGHT EYE: ICD-10-CM

## 2023-11-27 DIAGNOSIS — H20.9 UVEITIS-HYPHEMA-GLAUCOMA SYNDROME OF RIGHT EYE: ICD-10-CM

## 2023-11-27 DIAGNOSIS — H40.1121 PRIMARY OPEN-ANGLE GLAUCOMA, LEFT EYE, MILD STAGE: ICD-10-CM

## 2023-11-27 DIAGNOSIS — H40.1112 PRIMARY OPEN ANGLE GLAUCOMA (POAG) OF RIGHT EYE, MODERATE STAGE: Primary | ICD-10-CM

## 2023-11-27 PROCEDURE — 3288F PR FALLS RISK ASSESSMENT DOCUMENTED: ICD-10-PCS | Mod: CPTII,S$GLB,, | Performed by: OPHTHALMOLOGY

## 2023-11-27 PROCEDURE — 1101F PR PT FALLS ASSESS DOC 0-1 FALLS W/OUT INJ PAST YR: ICD-10-PCS | Mod: CPTII,S$GLB,, | Performed by: OPHTHALMOLOGY

## 2023-11-27 PROCEDURE — 1160F RVW MEDS BY RX/DR IN RCRD: CPT | Mod: CPTII,S$GLB,, | Performed by: OPHTHALMOLOGY

## 2023-11-27 PROCEDURE — 1126F AMNT PAIN NOTED NONE PRSNT: CPT | Mod: CPTII,S$GLB,, | Performed by: OPHTHALMOLOGY

## 2023-11-27 PROCEDURE — 3288F FALL RISK ASSESSMENT DOCD: CPT | Mod: CPTII,S$GLB,, | Performed by: OPHTHALMOLOGY

## 2023-11-27 PROCEDURE — 1159F PR MEDICATION LIST DOCUMENTED IN MEDICAL RECORD: ICD-10-PCS | Mod: CPTII,S$GLB,, | Performed by: OPHTHALMOLOGY

## 2023-11-27 PROCEDURE — 99999 PR PBB SHADOW E&M-EST. PATIENT-LVL II: ICD-10-PCS | Mod: PBBFAC,,, | Performed by: OPHTHALMOLOGY

## 2023-11-27 PROCEDURE — 1160F PR REVIEW ALL MEDS BY PRESCRIBER/CLIN PHARMACIST DOCUMENTED: ICD-10-PCS | Mod: CPTII,S$GLB,, | Performed by: OPHTHALMOLOGY

## 2023-11-27 PROCEDURE — 1126F PR PAIN SEVERITY QUANTIFIED, NO PAIN PRESENT: ICD-10-PCS | Mod: CPTII,S$GLB,, | Performed by: OPHTHALMOLOGY

## 2023-11-27 PROCEDURE — 99213 OFFICE O/P EST LOW 20 MIN: CPT | Mod: S$GLB,,, | Performed by: OPHTHALMOLOGY

## 2023-11-27 PROCEDURE — 1159F MED LIST DOCD IN RCRD: CPT | Mod: CPTII,S$GLB,, | Performed by: OPHTHALMOLOGY

## 2023-11-27 PROCEDURE — 1101F PT FALLS ASSESS-DOCD LE1/YR: CPT | Mod: CPTII,S$GLB,, | Performed by: OPHTHALMOLOGY

## 2023-11-27 PROCEDURE — 1157F ADVNC CARE PLAN IN RCRD: CPT | Mod: CPTII,S$GLB,, | Performed by: OPHTHALMOLOGY

## 2023-11-27 PROCEDURE — 99999 PR PBB SHADOW E&M-EST. PATIENT-LVL II: CPT | Mod: PBBFAC,,, | Performed by: OPHTHALMOLOGY

## 2023-11-27 PROCEDURE — 1157F PR ADVANCE CARE PLAN OR EQUIV PRESENT IN MEDICAL RECORD: ICD-10-PCS | Mod: CPTII,S$GLB,, | Performed by: OPHTHALMOLOGY

## 2023-11-27 PROCEDURE — 99213 PR OFFICE/OUTPT VISIT, EST, LEVL III, 20-29 MIN: ICD-10-PCS | Mod: S$GLB,,, | Performed by: OPHTHALMOLOGY

## 2023-11-27 NOTE — PROGRESS NOTES
HPI    DLS:7/6/2023- IOP ck     Pt states no new complaints.     Gtts:   Dorz/rm BID OU   Atropine BID OD   PF systane OU BID or more     Last edited by Naima Lynch on 11/27/2023  2:29 PM.            Assessment /Plan     For exam results, see Encounter Report.    Primary open angle glaucoma (POAG) of right eye, moderate stage    Primary open-angle glaucoma, left eye, mild stage    Uveitis-hyphema-glaucoma syndrome of right eye      1. Primary open angle glaucoma (POAG) of right eye, moderate stage  2. Primary open-angle glaucoma, left eye, mild stage  +famhx  Avg pachy    Tmax 20/18 per outside records  Tmax 30+ OD with hyphema  UGH syndrome and IOL malposition OD  Hx of Hyphema OD x 2, requiring washout, Cornea blood staining  HVF moderate damage OD, mild damage OS  OCT NFL damaged OD, normal OS    IOP excellent  May be overtreated    Continue  Dorz/rm bid OU    Can consider reducing to latan only if IOP still very well controlled    F/u 4-6 months, DFE OU    3. Uveitis-hyphema-glaucoma syndrome of right eye  HX AC washout and surgical iridectomy OD  7/2022  Doing well, AC quiet    Continue atropine BID OD

## 2023-11-30 ENCOUNTER — OFFICE VISIT (OUTPATIENT)
Dept: DERMATOLOGY | Facility: CLINIC | Age: 78
End: 2023-11-30
Payer: MEDICARE

## 2023-11-30 VITALS — WEIGHT: 108 LBS | BODY MASS INDEX: 16.95 KG/M2 | HEIGHT: 67 IN

## 2023-11-30 DIAGNOSIS — L81.4 SOLAR LENTIGO: ICD-10-CM

## 2023-11-30 DIAGNOSIS — D18.01 CHERRY ANGIOMA: ICD-10-CM

## 2023-11-30 DIAGNOSIS — L30.9 DERMATITIS: ICD-10-CM

## 2023-11-30 DIAGNOSIS — Z08 ENCOUNTER FOR FOLLOW-UP SURVEILLANCE OF SKIN CANCER: ICD-10-CM

## 2023-11-30 DIAGNOSIS — D48.5 NEOPLASM OF UNCERTAIN BEHAVIOR OF SKIN: Primary | ICD-10-CM

## 2023-11-30 DIAGNOSIS — L82.1 SEBORRHEIC KERATOSES: ICD-10-CM

## 2023-11-30 DIAGNOSIS — Z85.828 ENCOUNTER FOR FOLLOW-UP SURVEILLANCE OF SKIN CANCER: ICD-10-CM

## 2023-11-30 PROCEDURE — 1157F ADVNC CARE PLAN IN RCRD: CPT | Mod: CPTII,S$GLB,, | Performed by: DERMATOLOGY

## 2023-11-30 PROCEDURE — 99214 OFFICE O/P EST MOD 30 MIN: CPT | Mod: 25,S$GLB,, | Performed by: DERMATOLOGY

## 2023-11-30 PROCEDURE — 3288F FALL RISK ASSESSMENT DOCD: CPT | Mod: CPTII,S$GLB,, | Performed by: DERMATOLOGY

## 2023-11-30 PROCEDURE — 11102 TANGNTL BX SKIN SINGLE LES: CPT | Mod: S$GLB,,, | Performed by: DERMATOLOGY

## 2023-11-30 PROCEDURE — 1101F PT FALLS ASSESS-DOCD LE1/YR: CPT | Mod: CPTII,S$GLB,, | Performed by: DERMATOLOGY

## 2023-11-30 PROCEDURE — 99214 PR OFFICE/OUTPT VISIT, EST, LEVL IV, 30-39 MIN: ICD-10-PCS | Mod: 25,S$GLB,, | Performed by: DERMATOLOGY

## 2023-11-30 PROCEDURE — 11103 TANGNTL BX SKIN EA SEP/ADDL: CPT | Mod: S$GLB,,, | Performed by: DERMATOLOGY

## 2023-11-30 PROCEDURE — 1157F PR ADVANCE CARE PLAN OR EQUIV PRESENT IN MEDICAL RECORD: ICD-10-PCS | Mod: CPTII,S$GLB,, | Performed by: DERMATOLOGY

## 2023-11-30 PROCEDURE — 11102 PR TANGENTIAL BIOPSY, SKIN, SINGLE LESION: ICD-10-PCS | Mod: S$GLB,,, | Performed by: DERMATOLOGY

## 2023-11-30 PROCEDURE — 88305 TISSUE EXAM BY PATHOLOGIST: CPT | Mod: 59 | Performed by: PATHOLOGY

## 2023-11-30 PROCEDURE — 1160F PR REVIEW ALL MEDS BY PRESCRIBER/CLIN PHARMACIST DOCUMENTED: ICD-10-PCS | Mod: CPTII,S$GLB,, | Performed by: DERMATOLOGY

## 2023-11-30 PROCEDURE — 88305 TISSUE EXAM BY PATHOLOGIST: ICD-10-PCS | Mod: 26,,, | Performed by: PATHOLOGY

## 2023-11-30 PROCEDURE — 1160F RVW MEDS BY RX/DR IN RCRD: CPT | Mod: CPTII,S$GLB,, | Performed by: DERMATOLOGY

## 2023-11-30 PROCEDURE — 3288F PR FALLS RISK ASSESSMENT DOCUMENTED: ICD-10-PCS | Mod: CPTII,S$GLB,, | Performed by: DERMATOLOGY

## 2023-11-30 PROCEDURE — 1159F PR MEDICATION LIST DOCUMENTED IN MEDICAL RECORD: ICD-10-PCS | Mod: CPTII,S$GLB,, | Performed by: DERMATOLOGY

## 2023-11-30 PROCEDURE — 88305 TISSUE EXAM BY PATHOLOGIST: CPT | Mod: 26,,, | Performed by: PATHOLOGY

## 2023-11-30 PROCEDURE — 11103 PR TANGENTIAL BIOPSY, SKIN, EA ADDTL LESION: ICD-10-PCS | Mod: S$GLB,,, | Performed by: DERMATOLOGY

## 2023-11-30 PROCEDURE — 1159F MED LIST DOCD IN RCRD: CPT | Mod: CPTII,S$GLB,, | Performed by: DERMATOLOGY

## 2023-11-30 PROCEDURE — 1101F PR PT FALLS ASSESS DOC 0-1 FALLS W/OUT INJ PAST YR: ICD-10-PCS | Mod: CPTII,S$GLB,, | Performed by: DERMATOLOGY

## 2023-11-30 RX ORDER — TRIAMCINOLONE ACETONIDE 1 MG/G
CREAM TOPICAL
Qty: 80 G | Refills: 3 | Status: SHIPPED | OUTPATIENT
Start: 2023-11-30 | End: 2024-02-26 | Stop reason: SDUPTHER

## 2023-11-30 NOTE — PROGRESS NOTES
Subjective:      Patient ID:  Ayla Dela Cruz is a 78 y.o. female who presents for   Chief Complaint   Patient presents with    Rash     back     LOV 10/5/23 BCC left shoulder     Patient here today for rash to back x 2 weeks.  Started after taking Plavix, has since discontinued and switched to 325mg ASA (neurology advised)  Rash has since improved. Uses OTC cortisone and eucerin cream  C/O spots to head that are itchy.     Derm HX:  BCC left upper lip- 10/2022 , MOHs with  01/2023  BCC left upper back- 10/2022 , E&S  Denies Fhx of MM     Current Outpatient Medications:   ·  apixaban (ELIQUIS) 5 mg Tab, Take 1 tablet (5 mg total) by mouth 2 (two) times daily., Disp: 60 tablet, Rfl: 11  ·  aspirin (ECOTRIN) 325 MG EC tablet, Take 325 mg by mouth once daily., Disp: , Rfl:   ·  atropine 1% (ISOPTO ATROPINE) 1 % Drop, PLACE 1 DROP INTO THE RIGHT EYE 2 TIMES A DAY., Disp: 5 mL, Rfl: 6  ·  busPIRone (BUSPAR) 10 MG tablet, Take 1 tablet (10 mg total) by mouth 3 (three) times daily., Disp: 90 tablet, Rfl: 2  ·  dorzolamide-timolol 2-0.5% (COSOPT) 22.3-6.8 mg/mL ophthalmic solution, Place 1 drop into both eyes 3 (three) times daily., Disp: 20 mL, Rfl: 11  ·  LORazepam (ATIVAN) 0.5 MG tablet, Take 1 tablet (0.5 mg total) by mouth 3 (three) times daily as needed for Anxiety., Disp: 90 tablet, Rfl: 0  ·  metoprolol tartrate (LOPRESSOR) 25 MG tablet, TAKE 1 TABLET BY MOUTH TWICE A DAY, Disp: 180 tablet, Rfl: 3  ·  pantoprazole (PROTONIX) 40 MG tablet, Take 1 tablet (40 mg total) by mouth once daily., Disp: 90 tablet, Rfl: 3  ·  vortioxetine (TRINTELLIX) 20 mg Tab, Take 1 tablet (20 mg total) by mouth Daily., Disp: 30 tablet, Rfl: 3  ·  aspirin 81 MG Chew, TAKE 1 TABLET BY MOUTH EVERY DAY (Patient not taking: Reported on 11/22/2023), Disp: 90 tablet, Rfl: 4  ·  clopidogreL (PLAVIX) 75 mg tablet, Take 75 mg by mouth., Disp: , Rfl:             Review of Systems   Constitutional:  Negative  for fever, chills and fatigue.   Skin:  Positive for dry skin. Negative for daily sunscreen use and activity-related sunscreen use.   Hematologic/Lymphatic: Bruises/bleeds easily.       Objective:   Physical Exam   Constitutional: She appears well-developed and well-nourished. No distress.   Neurological: She is alert and oriented to person, place, and time. She is not disoriented.   Psychiatric: She has a normal mood and affect.   Skin:   Areas Examined (abnormalities noted in diagram):   Scalp / Hair Palpated and Inspected  Head / Face Inspection Performed  Neck Inspection Performed  Chest / Axilla Inspection Performed  Abdomen Inspection Performed  Back Inspection Performed  RUE Inspected  LUE Inspection Performed  Nails and Digits Inspection Performed                     Diagram Legend     Erythematous scaling macule/papule c/w actinic keratosis       Vascular papule c/w angioma      Pigmented verrucoid papule/plaque c/w seborrheic keratosis      Yellow umbilicated papule c/w sebaceous hyperplasia      Irregularly shaped tan macule c/w lentigo     1-2 mm smooth white papules consistent with Milia      Movable subcutaneous cyst with punctum c/w epidermal inclusion cyst      Subcutaneous movable cyst c/w pilar cyst      Firm pink to brown papule c/w dermatofibroma      Pedunculated fleshy papule(s) c/w skin tag(s)      Evenly pigmented macule c/w junctional nevus     Mildly variegated pigmented, slightly irregular-bordered macule c/w mildly atypical nevus      Flesh colored to evenly pigmented papule c/w intradermal nevus       Pink pearly papule/plaque c/w basal cell carcinoma      Erythematous hyperkeratotic cursted plaque c/w SCC      Surgical scar with no sign of skin cancer recurrence      Open and closed comedones      Inflammatory papules and pustules      Verrucoid papule consistent consistent with wart     Erythematous eczematous patches and plaques     Dystrophic onycholytic nail with subungual debris  c/w onychomycosis     Umbilicated papule    Erythematous-base heme-crusted tan verrucoid plaque consistent with inflamed seborrheic keratosis     Erythematous Silvery Scaling Plaque c/w Psoriasis     See annotation                Assessment / Plan:      Pathology Orders:       Normal Orders This Visit    Specimen to Pathology, Dermatology     Questions:    Procedure Type: Dermatology and skin neoplasms    Number of Specimens: 3    ------------------------: -------------------------    Spec 1 Procedure: Biopsy    Spec 1 Clinical Impression: ISK vs SCC    Spec 1 Source: R upper arm    ------------------------: -------------------------    Spec 2 Procedure: Biopsy    Spec 2 Clinical Impression: ulcerated crusted papule, r/o BCC vs SCC    Spec 2 Source: R angle of mandible    ------------------------: -------------------------    Spec 3 Procedure: Biopsy    Spec 3 Clinical Impression: SCC vs other    Spec 3 Source: left lateral cheek    Release to patient:           Neoplasm of uncertain behavior of skin  -     Specimen to Pathology, Dermatology  Shave biopsy procedure note:x3    Shave biopsy performed after verbal consent including risk of infection, scar, recurrence, need for additional treatment of site. Area prepped with alcohol, anesthetized with approximately 1.0cc of 1% lidocaine with epinephrine. Lesional tissue shaved with razor blade. Hemostasis achieved with application of aluminum chloride(NO hyfrecation- pacemaker/defib). No complications. Dressing applied. Wound care explained.    Dermatitis, back  -     triamcinolone acetonide 0.1% (KENALOG) 0.1 % cream; AAA back bid PRN rash  Dispense: 80 g; Refill: 3  Onset after starting plavix, improvement off plavix  Mild today, tac to hasten resolution    Encounter for follow-up surveillance of skin cancer  Area of previous NMSC (lip[and back) examined. Sites well healed with no signs of recurrence.  Upper body skin examination performed today including at least 9  points as noted in physical examination. 3 lesions suspicious for malignancy noted.    Solar lentigo  This is a benign hyperpigmented sun induced lesion. Daily sun protection will reduce the number of new lesions. Treatment of these benign lesions are considered cosmetic.    Cherry angioma  This is a benign vascular lesion. Reassurance given. No treatment required.     Seborrheic keratoses  These are benign inherited growths without a malignant potential. Reassurance given to patient. No treatment is necessary.     Patient instructed in importance in daily broad spectrum sun protection of at least spf 30. Mineral sunscreen ingredients preferred (Zinc +/- Titanium) and can be found OTC.   Recommend Elta MD for daily use on face and neck.  Patient encouraged to wear hat for all outdoor exposure.   Also discussed sun avoidance and use of protective clothing.             Follow up in about 6 months (around 5/30/2024), or if symptoms worsen or fail to improve.

## 2023-11-30 NOTE — PATIENT INSTRUCTIONS
Shave Biopsy Wound Care    Your doctor has performed a shave biopsy today.  A band aid and vaseline ointment has been placed over the site.  This should remain in place for 24 hours.  It is recommended that you keep the area dry for the first 24 hours.  After 24 hours, you may remove the band aid and wash the area with warm soap and water and apply Vaseline jelly.  Many patients prefer to use Neosporin or Bacitracin ointment.  This is acceptable; however, know that you can develop an allergy to this medication even if you have used it safely for years.  It is important to keep the area moist.  Letting it dry out and get air slows healing time, and will worsen the scar.  Band aid is optional after first 24 hours.      If you notice increasing redness, tenderness, pain, or yellow drainage at the biopsy site, please notify your doctor.  These are signs of an infection.    If your biopsy site is bleeding, apply firm pressure for 15 minutes straight.  Repeat for another 15 minutes, if it is still bleeding.   If the surgical site continues to bleed, then please contact your doctor.       [facility] - DERMATOLOGY  [address] [address], [address]  [facility] [address]  Dept: [phone]  Dept Fax: [phone]

## 2023-11-30 NOTE — PROGRESS NOTES
Subjective:      Patient ID:  Ayla Dela Cruz is a 78 y.o. female who presents for   Chief Complaint   Patient presents with    Rash     back     HPI    Review of Systems    Objective:   Physical Exam     Diagram Legend     Erythematous scaling macule/papule c/w actinic keratosis       Vascular papule c/w angioma      Pigmented verrucoid papule/plaque c/w seborrheic keratosis      Yellow umbilicated papule c/w sebaceous hyperplasia      Irregularly shaped tan macule c/w lentigo     1-2 mm smooth white papules consistent with Milia      Movable subcutaneous cyst with punctum c/w epidermal inclusion cyst      Subcutaneous movable cyst c/w pilar cyst      Firm pink to brown papule c/w dermatofibroma      Pedunculated fleshy papule(s) c/w skin tag(s)      Evenly pigmented macule c/w junctional nevus     Mildly variegated pigmented, slightly irregular-bordered macule c/w mildly atypical nevus      Flesh colored to evenly pigmented papule c/w intradermal nevus       Pink pearly papule/plaque c/w basal cell carcinoma      Erythematous hyperkeratotic cursted plaque c/w SCC      Surgical scar with no sign of skin cancer recurrence      Open and closed comedones      Inflammatory papules and pustules      Verrucoid papule consistent consistent with wart     Erythematous eczematous patches and plaques     Dystrophic onycholytic nail with subungual debris c/w onychomycosis     Umbilicated papule    Erythematous-base heme-crusted tan verrucoid plaque consistent with inflamed seborrheic keratosis     Erythematous Silvery Scaling Plaque c/w Psoriasis     See annotation      Assessment / Plan:        There are no diagnoses linked to this encounter.         No follow-ups on file.

## 2023-12-04 ENCOUNTER — PATIENT MESSAGE (OUTPATIENT)
Dept: FAMILY MEDICINE | Facility: CLINIC | Age: 78
End: 2023-12-04
Payer: MEDICARE

## 2023-12-04 DIAGNOSIS — J39.2 THROAT DRYNESS: Primary | ICD-10-CM

## 2023-12-04 DIAGNOSIS — L60.0 INGROWN TOENAIL: ICD-10-CM

## 2023-12-04 DIAGNOSIS — J34.89 NASAL DISCOMFORT: ICD-10-CM

## 2023-12-04 DIAGNOSIS — R09.89 SINUS COMPLAINT: ICD-10-CM

## 2023-12-05 ENCOUNTER — PATIENT MESSAGE (OUTPATIENT)
Dept: OPHTHALMOLOGY | Facility: CLINIC | Age: 78
End: 2023-12-05
Payer: MEDICARE

## 2023-12-05 DIAGNOSIS — H40.1112 PRIMARY OPEN ANGLE GLAUCOMA (POAG) OF RIGHT EYE, MODERATE STAGE: Primary | ICD-10-CM

## 2023-12-05 DIAGNOSIS — H40.1121 PRIMARY OPEN-ANGLE GLAUCOMA, LEFT EYE, MILD STAGE: ICD-10-CM

## 2023-12-06 LAB
FINAL PATHOLOGIC DIAGNOSIS: NORMAL
GROSS: NORMAL
Lab: NORMAL
MICROSCOPIC EXAM: NORMAL

## 2023-12-06 NOTE — TELEPHONE ENCOUNTER
Copy of note to patient:    Brimonidine, atropine, and dorzolamide in her eyedrops are all capable of causing dry mouth and a dry nose independently.  With them all taken together its almost certain to be the cause.    Unless its an emergency I would suggest we get her in with podiatry for the ingrown nail.

## 2023-12-07 NOTE — PATIENT INSTRUCTIONS
Understanding Ingrown Toenails    An ingrown nail is the result of a nail growing into the skin that surrounds it. This often occurs at either edge of the big toe. Ingrown nails may be caused by improper trimming, inherited nail deformities, injuries, fungal infections, or pressure.    Symptoms  Ingrown nails may cause pain at the tip of the toe or all the way to the base of the toe. The pain is often worse while walking. An ingrown nail may also lead to infection, inflammation, or a more serious condition. If its infected, you might see pus or redness.    Evaluation  To determine the extent of your problem, your healthcare provider examines and possibly presses the painful area. If other problems are suspected, blood tests, cultures, and X-rays may be done as well.    Treatment  If the nail isnt infected, your healthcare provider may trim the corner of it to help relieve your symptoms. He or she may need to remove one side of your nail back to the cuticle. The base of the nail may then be treated with a chemical to keep the ingrown part from growing back. Severe infections or ingrown nails may require antibiotics and temporary or permanent removal of a portion of the nail. To prevent pain, a local anesthetic may be used in these procedures. This treatment is usually done at your healthcare provider's office.    Prevention  Many nail problems can be prevented by wearing the right shoes and trimming your nails properly. To help avoid infection, keep your feet clean and dry. If you have diabetes, talk with your healthcare provider before doing any foot self-care.  The right shoes: Get your feet measured (your size may change as you age). Wear shoes that are supportive and roomy enough for your toes to wiggle. Look for shoes made of natural materials such as leather, which allow your feet to breathe.  Proper trimming: To avoid problems, trim your toenails straight across without cutting down into the corners. If you  cant trim your own nails, ask your healthcare provider to do so for you.    Date Last Reviewed: 10/1/2016  © 0038-7108 [employer]. [address] [place] [address], [place] [address]. All rights reserved. This information is not intended as a substitute for professional medical care. Always follow your healthcare professional's instructions.

## 2023-12-08 ENCOUNTER — PROCEDURE VISIT (OUTPATIENT)
Dept: DERMATOLOGY | Facility: CLINIC | Age: 78
End: 2023-12-08
Payer: MEDICARE

## 2023-12-08 DIAGNOSIS — C44.319 BASAL CELL CARCINOMA (BCC) OF SKIN OF OTHER PART OF FACE: Primary | ICD-10-CM

## 2023-12-08 PROCEDURE — 99499 UNLISTED E&M SERVICE: CPT | Mod: S$GLB,,, | Performed by: DERMATOLOGY

## 2023-12-08 PROCEDURE — 88305 TISSUE EXAM BY PATHOLOGIST: CPT | Mod: 26,,, | Performed by: PATHOLOGY

## 2023-12-08 PROCEDURE — 11642 PR EXC SKIN MALIG 1.1-2 CM FACE,FACIAL: ICD-10-PCS | Mod: S$GLB,,, | Performed by: DERMATOLOGY

## 2023-12-08 PROCEDURE — 11642 EXC F/E/E/N/L MAL+MRG 1.1-2: CPT | Mod: S$GLB,,, | Performed by: DERMATOLOGY

## 2023-12-08 PROCEDURE — 12052 INTMD RPR FACE/MM 2.6-5.0 CM: CPT | Mod: 51,S$GLB,, | Performed by: DERMATOLOGY

## 2023-12-08 PROCEDURE — 88305 TISSUE EXAM BY PATHOLOGIST: ICD-10-PCS | Mod: 26,,, | Performed by: PATHOLOGY

## 2023-12-08 PROCEDURE — 99499 NO LOS: ICD-10-PCS | Mod: S$GLB,,, | Performed by: DERMATOLOGY

## 2023-12-08 PROCEDURE — 12052 PR INTERMED WOUND REPAIR FACE/EAR/EYELID/NOSE/LIP/MUC MEBR, 2.6 TO 5.0CM: ICD-10-PCS | Mod: 51,S$GLB,, | Performed by: DERMATOLOGY

## 2023-12-08 PROCEDURE — 88305 TISSUE EXAM BY PATHOLOGIST: CPT | Performed by: PATHOLOGY

## 2023-12-08 RX ORDER — LATANOPROST 50 UG/ML
1 SOLUTION/ DROPS OPHTHALMIC DAILY
Qty: 2.5 ML | Refills: 11 | Status: SHIPPED | OUTPATIENT
Start: 2023-12-08

## 2023-12-08 NOTE — PATIENT INSTRUCTIONS
Surgery Wound Care    Your doctor has performed an excision today.  A bandage and vaseline ointment has been placed over the site.  This should remain in place for 24 hours.  It is recommended that you keep the area dry for the first 24 hours.  After 24 hours, you may remove the band aid and wash the area with warm soap and water and apply Vaseline jelly or aquaphore.  Many patients prefer to use Neosporin or Bacitracin ointment.  This is acceptable; however know that you can develop an allergy to this medication even if you have used it safely for years.  It is important to keep the area moist.  Letting it dry out and get air slows healing time, will worsen the scar, and make it more difficult to remove the stitches if they were placed.        If you notice increasing redness, tenderness, pain, or yellow drainage at the biopsy or surgical site, please notify your doctor.  These are signs of an infection.    If your biopsy/surgical site is bleeding, apply firm pressure for 15 minutes straight.  Repeat for another 15 minutes, if it is still bleeding.   If the surgical site continues to bleed, then please contact your doctor.    [facility] - DERMATOLOGY  [address] [place] [address] [address]  [facility] [address]  Dept: [phone]

## 2023-12-08 NOTE — PROGRESS NOTES
Subjective:      Patient ID:  Ayla Dela Cruz is a 78 y.o. female who presents for   Chief Complaint   Patient presents with    Basal Cell Carcinoma     Right angle of mandible      Pt here for definitive excision of BCC right angle of mandible.  Feeling well today.   ++pacemaker++  Takes eliquis daily.  Recheck right upper arm and left lateral cheek, AK sites.     Skin, right upper arm, shave biopsy:  - HYPERTROPHIC ACTINIC KERATOSIS.  - THE ATYPICAL SQUAMOUS EPITHELIUM EXTENDS TO THE BASE OF THE BIOPSY, AND AN UNDERLYING INVASIVE SQUAMOUS CELL CARCINOMA CANNOT BE ENTIRELY EXCLUDED.      Skin, right angle of mandible, shave biopsy:  - BASAL CELL CARCINOMA WITH MIXED SUPERFICIAL AND NODULAR GROWTH PATTERN.  - THE TUMOR EXTENDS TO THE DEEP BIOPSY MARGIN.    Skin, left lateral cheek, shave biopsy:  - ACTINIC KERATOSIS, FOCALLY EXCORIATED/ TRAUMATIZED.  - THE ATYPICAL SQUAMOUS EPITHELIUM EXTENDS TO THE BASE OF THE BIOPSY, AND AN UNDERLYING INVASIVE SQUAMOUS CELL CARCINOMA CANNOT BE ENTIRELY EXCLUDED.                           Review of Systems   Constitutional:  Negative for fever and chills.       Objective:   Physical Exam   Constitutional: She appears well-developed and well-nourished. No distress.   Neurological: She is alert and oriented to person, place, and time. She is not disoriented.   Psychiatric: She has a normal mood and affect.   Skin:   Areas Examined (abnormalities noted in diagram):   Head / Face Inspection Performed            Diagram Legend     Erythematous scaling macule/papule c/w actinic keratosis       Vascular papule c/w angioma      Pigmented verrucoid papule/plaque c/w seborrheic keratosis      Yellow umbilicated papule c/w sebaceous hyperplasia      Irregularly shaped tan macule c/w lentigo     1-2 mm smooth white papules consistent with Milia      Movable subcutaneous cyst with punctum c/w epidermal inclusion cyst      Subcutaneous movable cyst c/w pilar cyst      Firm pink to brown  papule c/w dermatofibroma      Pedunculated fleshy papule(s) c/w skin tag(s)      Evenly pigmented macule c/w junctional nevus     Mildly variegated pigmented, slightly irregular-bordered macule c/w mildly atypical nevus      Flesh colored to evenly pigmented papule c/w intradermal nevus       Pink pearly papule/plaque c/w basal cell carcinoma      Erythematous hyperkeratotic cursted plaque c/w SCC      Surgical scar with no sign of skin cancer recurrence      Open and closed comedones      Inflammatory papules and pustules      Verrucoid papule consistent consistent with wart     Erythematous eczematous patches and plaques     Dystrophic onycholytic nail with subungual debris c/w onychomycosis     Umbilicated papule    Erythematous-base heme-crusted tan verrucoid plaque consistent with inflamed seborrheic keratosis     Erythematous Silvery Scaling Plaque c/w Psoriasis     See annotation      Assessment / Plan:      Pathology Orders:       Normal Orders This Visit    Specimen to Pathology, Dermatology     Comments:    Number of Specimens:->1  ------------------------->-------------------------  Spec 1 Procedure:->Excision >2cm  Spec 1 Clinical Impression:->bx proven bcc check margins  Spec 1 Source:->R angle mandible    Questions:    Procedure Type: Dermatology and skin neoplasms    Number of Specimens: 1    ------------------------: -------------------------    Spec 1 Procedure: Excision >2cm    Spec 1 Clinical Impression: bx proven bcc check margins    Spec 1 Source: R angle mandible    Release to patient:           Basal cell carcinoma (BCC) of skin of other part of face  -     Specimen to Pathology, Dermatology    PROCEDURE: Elliptical excision with intermediate layered repair in order to decrease dead space, decrease tension, and maintain function of area.    ANESTHETIC: 6 cc 1% Xylocaine with Epinephrine 1:100,000, buffered    SURGEON: Allie Jarrell MD  ASSISTANTS: Kimberly Ramos RN,  Adri Anderson,  Rubio CONNORS MA    PREOPERATIVE DIAGNOSIS:  Biopsy-proven Basal Cell Carcinoma    POSTOPERATIVE DIAGNOSIS:  Same as preoperative diagnosis    PATHOLOGIC DIAGNOSIS: Pending    LOCATION: R angle mandible    INITIAL LESION SIZE: 0.5x1cm     EXCISED DIAMETER: 1.6 cm    PREPARATION: The diagnosis, procedure, alternatives, benefits and risks, including but not limited to: infection, bleeding/bruising, drug reactions, pain, scar or cosmetic defect, local sensation disturbances, wound dehiscence (separation of wound edges after sutures removed) and/or recurrence of present condition were explained to the patient. The patient elected to proceed.  Patient's identity was verified using 2 patient identifiers and the side and site was verified.  Time out period with surgeon, assistant and patient in surgical suite was taken.    PROCEDURE: The location noted above was prepped and draped in the usual sterile fashion. The area was anesthetized with intradermal buffered xylocaine. Lesional tissue was carefully marked with at least 3 mm margins of clinically normal skin in all directions. A fusiform elliptical excision was done with #15 blade carried down completely through the dermis into the subcutaneous tissues to the level of the subcutaneous fat, and dissection was carried out in that plane.  Electrocoagulation was used to obtain hemostasis. Blood loss was minimal. The wound was then approximated in a layered fashion with subcutaneous and intradermal sutures of 5.0 Monocryl, approximately 5 in number, and the wound was then superficially closed with simple interrupted sutures of 5.0 Prolene.    The patient tolerated the procedure well.    The area was cleaned and dressed appropriately and the patient was given wound care instructions, as well as an appointment for follow-up evaluation.    LENGTH OF REPAIR: 3 cm            Follow up in about 2 weeks (around 12/22/2023) for suture removal.

## 2023-12-11 ENCOUNTER — OFFICE VISIT (OUTPATIENT)
Dept: PODIATRY | Facility: CLINIC | Age: 78
End: 2023-12-11
Payer: MEDICARE

## 2023-12-11 VITALS — HEIGHT: 67 IN | HEART RATE: 77 BPM | BODY MASS INDEX: 16.88 KG/M2 | OXYGEN SATURATION: 100 % | WEIGHT: 107.56 LBS

## 2023-12-11 DIAGNOSIS — L60.0 INGROWN TOENAIL: Primary | ICD-10-CM

## 2023-12-11 DIAGNOSIS — M79.674 PAIN OF TOE OF RIGHT FOOT: ICD-10-CM

## 2023-12-11 PROCEDURE — 1101F PT FALLS ASSESS-DOCD LE1/YR: CPT | Mod: CPTII,S$GLB,, | Performed by: PODIATRIST

## 2023-12-11 PROCEDURE — 1160F PR REVIEW ALL MEDS BY PRESCRIBER/CLIN PHARMACIST DOCUMENTED: ICD-10-PCS | Mod: CPTII,S$GLB,, | Performed by: PODIATRIST

## 2023-12-11 PROCEDURE — 1159F PR MEDICATION LIST DOCUMENTED IN MEDICAL RECORD: ICD-10-PCS | Mod: CPTII,S$GLB,, | Performed by: PODIATRIST

## 2023-12-11 PROCEDURE — 99203 OFFICE O/P NEW LOW 30 MIN: CPT | Mod: 25,S$GLB,, | Performed by: PODIATRIST

## 2023-12-11 PROCEDURE — 1157F ADVNC CARE PLAN IN RCRD: CPT | Mod: CPTII,S$GLB,, | Performed by: PODIATRIST

## 2023-12-11 PROCEDURE — 3288F PR FALLS RISK ASSESSMENT DOCUMENTED: ICD-10-PCS | Mod: CPTII,S$GLB,, | Performed by: PODIATRIST

## 2023-12-11 PROCEDURE — 1125F PR PAIN SEVERITY QUANTIFIED, PAIN PRESENT: ICD-10-PCS | Mod: CPTII,S$GLB,, | Performed by: PODIATRIST

## 2023-12-11 PROCEDURE — 1157F PR ADVANCE CARE PLAN OR EQUIV PRESENT IN MEDICAL RECORD: ICD-10-PCS | Mod: CPTII,S$GLB,, | Performed by: PODIATRIST

## 2023-12-11 PROCEDURE — 1101F PR PT FALLS ASSESS DOC 0-1 FALLS W/OUT INJ PAST YR: ICD-10-PCS | Mod: CPTII,S$GLB,, | Performed by: PODIATRIST

## 2023-12-11 PROCEDURE — 11750 NAIL REMOVAL: ICD-10-PCS | Mod: T5,S$GLB,, | Performed by: PODIATRIST

## 2023-12-11 PROCEDURE — 1159F MED LIST DOCD IN RCRD: CPT | Mod: CPTII,S$GLB,, | Performed by: PODIATRIST

## 2023-12-11 PROCEDURE — 1125F AMNT PAIN NOTED PAIN PRSNT: CPT | Mod: CPTII,S$GLB,, | Performed by: PODIATRIST

## 2023-12-11 PROCEDURE — 99999 PR PBB SHADOW E&M-EST. PATIENT-LVL IV: ICD-10-PCS | Mod: PBBFAC,,, | Performed by: PODIATRIST

## 2023-12-11 PROCEDURE — 1160F RVW MEDS BY RX/DR IN RCRD: CPT | Mod: CPTII,S$GLB,, | Performed by: PODIATRIST

## 2023-12-11 PROCEDURE — 99203 PR OFFICE/OUTPT VISIT, NEW, LEVL III, 30-44 MIN: ICD-10-PCS | Mod: 25,S$GLB,, | Performed by: PODIATRIST

## 2023-12-11 PROCEDURE — 99999 PR PBB SHADOW E&M-EST. PATIENT-LVL IV: CPT | Mod: PBBFAC,,, | Performed by: PODIATRIST

## 2023-12-11 PROCEDURE — 11750 EXCISION NAIL&NAIL MATRIX: CPT | Mod: T5,S$GLB,, | Performed by: PODIATRIST

## 2023-12-11 PROCEDURE — 3288F FALL RISK ASSESSMENT DOCD: CPT | Mod: CPTII,S$GLB,, | Performed by: PODIATRIST

## 2023-12-11 NOTE — PROGRESS NOTES
"  [address] [facility] [address] [place] [address]  Phone: [phone]   [phone]    Patient's PCP:Jan Olivo MD  Referring Provider: Dr. Jan Olivo    Subjective:      Chief Complaint:: Ingrown Toenail (Medial side of the right big toe )    Ingrown Toenail  Pertinent negatives include no abdominal pain, arthralgias, chest pain, chills, coughing, fatigue, fever, headaches, joint swelling, myalgias, nausea, neck pain, numbness, rash or weakness.     Ayla Dela Cruz is a 78 y.o. female who presents today with a complaint of Ingrown Toenail medial side of the right big toe. The current episode started about 3 weeks.  The symptoms include aching pain. Probable cause of complaint unknown.  The symptoms are aggravated by pressure. The problem has improved. Treatment to date have included salon cut it out which provided some relief.         Vitals:    12/11/23 1609   Pulse: 77   SpO2: 100%   Weight: 48.8 kg (107 lb 9.4 oz)   Height: 5' 7" (1.702 m)   PainSc:   5      Shoe Size: 9.5    Past Surgical History:   Procedure Laterality Date    2 heart ablations      3 in total    A-V CARDIAC PACEMAKER INSERTION Left 10/14/2021    Procedure: INSERTION, CARDIAC PACEMAKER, DUAL CHAMBER;  Surgeon: Fco Calderon MD;  Location: [facility] EP LAB;  Service: Cardiology;  Laterality: Left;  PAF/ [place] Arrthymias, Dual PPM, SJM, MAC, GP, 3 PREP    ANTERIOR VITRECTOMY Right 7/27/2022    Procedure: VITRECTOMY, ANTERIOR APPROACH;  Surgeon: Daniel Tan MD;  Location: [facility] OR;  Service: Ophthalmology;  Laterality: Right;    bilateral cataracts      CHOLECYSTECTOMY      COLONOSCOPY N/A 1/19/2017    Procedure: COLONOSCOPY and EGD;  Surgeon: Jonathan Schultz MD;  Location: [facility];  Service: Endoscopy;  Laterality: N/A;    COLONOSCOPY N/A 10/10/2019    Procedure: COLONOSCOPY;  Surgeon: Alex Christian MD;  Location: [facility];  Service: Endoscopy;  Laterality: N/A;    ESOPHAGOGASTRODUODENOSCOPY N/A 10/14/2019    " Procedure: EGD (ESOPHAGOGASTRODUODENOSCOPY);  Surgeon: Alex Christian MD;  Location: [facility];  Service: Endoscopy;  Laterality: N/A;    INJECTION OF ANESTHETIC AGENT AROUND MEDIAL BRANCH NERVES INNERVATING CERVICAL FACET JOINT Right 6/7/2018    Procedure: BLOCK, NERVE, FACET JOINT, MEDIAL BRANCH, CERVICAL;  Surgeon: Galo Clancy MD;  Location: [facility] OR;  Service: Pain Management;  Laterality: Right;  C4, 5, 6    OPEN REDUCTION AND INTERNAL FIXATION (ORIF) OF INJURY OF ANKLE Right 11/1/2018    Procedure: ORIF, ANKLE;  Surgeon: Wilfredo Aguero MD;  [facility];  Service: Orthopedics;  Laterality: Right;    PARACENTESIS, EYE, ANTERIOR CHAMBER, WITH AQUEOUS REMOVAL Right 7/27/2022    Procedure: Anterior chamber washout, possible IOL removal;  Surgeon: Daniel Tan MD;  Location: [facility];  Service: Ophthalmology;  Laterality: Right;    pyloristenosis      RADIOFREQUENCY ABLATION OF LUMBAR MEDIAL BRANCH NERVE AT SINGLE LEVEL Bilateral 9/21/2018    Procedure: RADIOFREQUENCY ABLATION, NERVE, SPINAL, LUMBAR, MEDIAL BRANCH, 1 LEVEL;  Surgeon: Galo Clancy MD;  Location: [facility];  Service: Pain Management;  Laterality: Bilateral;  L3, 4, 5    RADIOFREQUENCY ABLATION OF LUMBAR MEDIAL BRANCH NERVE AT SINGLE LEVEL Bilateral 2/19/2019    Procedure: Radiofrequency Ablation, Nerve, Spinal, Lumbar, Medial Branch, 1 Level;  Surgeon: Galo Clancy MD;  Location: [facility] OR;  Service: Pain Management;  Laterality: Bilateral;  L3, 4, 5     RADIOFREQUENCY ABLATION OF LUMBAR MEDIAL BRANCH NERVE AT SINGLE LEVEL Bilateral 3/10/2020    Procedure: Radiofrequency Ablation, Nerve, Spinal, Lumbar, Medial Branch, 1 Level;  Surgeon: Galo Clancy MD;  Location: [facility] OR;  Service: Pain Management;  Laterality: Bilateral;  L3,4,5 - Burned at 80 degrees C. for 60 seconds x 2 each site      RADIOFREQUENCY ABLATION OF LUMBAR MEDIAL BRANCH NERVE AT SINGLE LEVEL Bilateral 9/11/2020    Procedure: Radiofrequency Ablation, Nerve, Spinal, Lumbar, Medial  Branch, 1 Level;  Surgeon: Galo Clancy MD;  Location: [facility] OR;  Service: Pain Management;  Laterality: Bilateral;  L3, 4, 5 - Burned at 80 degrees C. for 60 seconds x 2 each site    RADIOFREQUENCY ABLATION OF LUMBAR MEDIAL BRANCH NERVE AT SINGLE LEVEL Bilateral 5/28/2021    Procedure: Radiofrequency Ablation, Nerve, Spinal, Lumbar, Medial Branch, 1 Level;  Surgeon: Galo Clancy MD;  Location: [facility] OR;  Service: Pain Management;  Laterality: Bilateral;  L3,4,5    RADIOFREQUENCY THERMAL COAGULATION OF MEDIAL BRANCH OF POSTERIOR RAMUS OF CERVICAL SPINAL NERVE Right 7/3/2018    Procedure: RADIOFREQUENCY THERMAL COAGULATION, NERVE, SPINAL, CERVICAL, MEDIAL BRANCH OF POSTERIOR RAMUS;  Surgeon: Galo Clancy MD;  Location: [facility] OR;  Service: Pain Management;  Laterality: Right;  C4,5,6 - Burned at 80 degrees C. for 75 seconds each site    RADIOFREQUENCY THERMAL COAGULATION OF MEDIAL BRANCH OF POSTERIOR RAMUS OF CERVICAL SPINAL NERVE Right 7/23/2019    Procedure: RADIOFREQUENCY THERMAL COAGULATION, NERVE, SPINAL, CERVICAL, POSTERIOR RAMUS, MEDIAL BRANCH;  Surgeon: Galo Clancy MD;  Location: [facility] OR;  Service: Pain Management;  Laterality: Right;  C4,5,6    RADIOFREQUENCY THERMAL COAGULATION OF MEDIAL BRANCH OF POSTERIOR RAMUS OF CERVICAL SPINAL NERVE Right 6/23/2020    Procedure: RADIOFREQUENCY THERMAL COAGULATION, NERVE, SPINAL, CERVICAL, POSTERIOR RAMUS, MEDIAL BRANCH;  Surgeon: Galo Clancy MD;  Location: [facility] OR;  Service: Pain Management;  Laterality: Right;  C4, 5, 6    RADIOFREQUENCY THERMOCOAGULATION Bilateral 9/10/2019    Procedure: RADIOFREQUENCY THERMAL COAGULATION LUMBAR;  Surgeon: Galo Clancy MD;  Location: [facility] OR;  Service: Pain Management;  Laterality: Bilateral;  L3,4,5 - Burned at 80 degrees C. for 60 seconds x 2 each site    skin cancer removal       TONSILLECTOMY       Past Medical History:   Diagnosis Date    Anticoagulant long-term use     Anxiety     Arthritis     Atrial fibrillation     Basal cell  carcinoma     Cancer     skin    CHF (congestive heart failure)     Depression     DVT (deep venous thrombosis)     GERD (gastroesophageal reflux disease)     Glaucoma (increased eye pressure)     Hyperlipidemia     diet controlled    Hypertension     Interstitial lung disease     Localized hives 01/10/2020    Mild persistent asthma without complication 11/12/2018    Pacemaker     Pneumonia 01/31/2014    Stroke 06/03/2014    Stroke     TIA (transient ischemic attack)     TIA (transient ischemic attack)      Family History   Problem Relation Age of Onset    Arthritis Mother     Asthma Mother     Rheum arthritis Mother     Pneumonia Mother     Skin cancer Mother         unsure of type    Heart disease Father     Ulcers Father     Alzheimer's disease Maternal Uncle     Rheum arthritis Maternal Grandmother     Emphysema Maternal Grandfather     Cancer Maternal Grandfather         kidney    Kidney disease Maternal Grandfather     Cancer Paternal Grandmother         lung= smoker    Pneumonia Paternal Grandfather     Depression Son     Breast cancer Neg Hx     Ovarian cancer Neg Hx         Social History:   Marital Status:   Alcohol History:  reports no history of alcohol use.  Tobacco History:  reports that she has never smoked. She has never used smokeless tobacco.  Drug History:  reports no history of drug use.    Review of patient's allergies indicates:   Allergen Reactions    Gabapentin Hallucinations    Xarelto [rivaroxaban] Rash    Bactrim [sulfamethoxazole-trimethoprim] Other (See Comments)     Upset stomach, dry heaves, confusion    Afrin (pseudoephedrine)     Amoxicillin-pot clavulanate      Other reaction(s): Mental Status Change    Atorvastatin      Other reaction(s): Joint pain    Baclofen     Baclofen (bulk) Nausea And Vomiting    Ciprofloxacin     Decongest tabs      Other reaction(s): increased heart rate    Decongestant [pseudoephedrine hcl]     Erythromycin Other (See Comments)    Flecainide Hives      And SOB. No reaction to Lidocaine     Fluoxetine      Other reaction(s): heart palpitations  Other reaction(s): anxiety    Lisinopril Other (See Comments)     cough    Losartan Other (See Comments)     Hypotension with lightheadedness    Morphine Other (See Comments)     confusion    Tramadol Other (See Comments)     SOB, low BP    Venlafaxine     Venlafaxine analogues      Changes in BP and increases heart rate       Afrin [oxymetazoline] Palpitations    Caffeine Palpitations    Dabigatran etexilate Rash    Tizanidine Anxiety     dizziness       Current Outpatient Medications   Medication Sig Dispense Refill    apixaban (ELIQUIS) 5 mg Tab Take 1 tablet (5 mg total) by mouth 2 (two) times daily. 60 tablet 11    aspirin (ECOTRIN) 325 MG EC tablet Take 325 mg by mouth once daily.      atropine 1% (ISOPTO ATROPINE) 1 % Drop PLACE 1 DROP INTO THE RIGHT EYE 2 TIMES A DAY. 5 mL 6    busPIRone (BUSPAR) 10 MG tablet Take 1 tablet (10 mg total) by mouth 3 (three) times daily. 90 tablet 2    clopidogreL (PLAVIX) 75 mg tablet Take 75 mg by mouth.      latanoprost 0.005 % ophthalmic solution Place 1 drop into both eyes once daily. 2.5 mL 11    LORazepam (ATIVAN) 0.5 MG tablet Take 1 tablet (0.5 mg total) by mouth 3 (three) times daily as needed for Anxiety. 90 tablet 0    metoprolol tartrate (LOPRESSOR) 25 MG tablet TAKE 1 TABLET BY MOUTH TWICE A  tablet 3    pantoprazole (PROTONIX) 40 MG tablet Take 1 tablet (40 mg total) by mouth once daily. 90 tablet 3    triamcinolone acetonide 0.1% (KENALOG) 0.1 % cream AAA back bid PRN rash 80 g 3    vortioxetine (TRINTELLIX) 20 mg Tab Take 1 tablet (20 mg total) by mouth Daily. 30 tablet 3     No current facility-administered medications for this visit.     Facility-Administered Medications Ordered in Other Visits   Medication Dose Route Frequency Provider Last Rate Last Admin    bupivacaine (PF) 0.25% (2.5 mg/ml) injection    PRN Galo Clancy MD   6 mL at 02/19/19 4854     lidocaine (PF) 10 mg/ml (1%) injection    PRN Galo Clancy MD   9 mL at 02/19/19 1304    lidocaine (PF) 20 mg/ml (2%) injection    PRN Galo Clancy MD   6 mL at 02/19/19 1310    methylPREDNISolone acetate injection    PRN Galo Clancy MD   80 mg at 02/19/19 1314       Review of Systems   Constitutional:  Negative for chills, fatigue, fever and unexpected weight change.   HENT:  Negative for hearing loss and trouble swallowing.    Eyes:  Negative for photophobia and visual disturbance.   Respiratory:  Negative for cough, shortness of breath and wheezing.    Cardiovascular:  Negative for chest pain, palpitations and leg swelling.   Gastrointestinal:  Negative for abdominal pain and nausea.   Genitourinary:  Negative for dysuria and frequency.   Musculoskeletal:  Negative for arthralgias, back pain, gait problem, joint swelling, myalgias and neck pain.   Skin:  Negative for rash and wound.   Neurological:  Negative for tremors, seizures, weakness, numbness and headaches.   Hematological:  Does not bruise/bleed easily.         Objective:        Physical Exam:   Foot Exam    General  Orientation: alert and oriented to person, place, and time   Affect: appropriate   Gait: antalgic       Right Foot/Ankle     Inspection and Palpation  Ecchymosis: none  Tenderness: (Medial border great toenail)  Swelling: (Medial border great toenail)  Arch: normal  Skin Exam: skin intact; no drainage, no ulcer and no erythema   Neurovascular  Dorsalis pedis: 2+  Posterior tibial: 1+  Capillary Refill: 2+  Varicose veins: not present  Saphenous nerve sensation: normal  Tibial nerve sensation: normal  Superficial peroneal nerve sensation: normal  Deep peroneal nerve sensation: normal  Sural nerve sensation: normal    Edema  Type of edema: non-pitting    Comments  Ingrowing medial border of the great toenail.  Tender to palpation.  Mild edema is present.  No purulence.        Physical Exam  Cardiovascular:      Pulses:           Dorsalis pedis  pulses are 2+ on the right side.        Posterior tibial pulses are 1+ on the right side.   Feet:      Right foot:      Skin integrity: No ulcer or erythema.               Right Ankle/Foot Exam     Comments:  Ingrowing medial border of the great toenail.  Tender to palpation.  Mild edema is present.  No purulence.        Vascular Exam     Right Pulses  Dorsalis Pedis:      2+  Posterior Tibial:      1+           Imaging: none            Assessment:       1. Ingrown toenail    2. Pain of toe of right foot      Plan:   Ingrown toenail  -     Ambulatory referral/consult to Podiatry  -     Nail Removal    Pain of toe of right foot  -     Nail Removal      Follow up if symptoms worsen or fail to improve.    We discussed ingrown toenail treatment options of no treatment, avulsion of nail border under local with regrowth of nail, chemical matrixectomy for attempted permanent correction of the problem. Patient was educated about daily dressing changes, soaks, and medications following removal of the nail.       Nail Removal    Date/Time: 12/11/2023 4:00 PM    Performed by: Kavon Sequeira DPM  Authorized by: Kavon Sequeira DPM    Consent Done?:  Yes (Written)  Time out: Immediately prior to the procedure a time out was called    Prep: patient was prepped and draped in usual sterile fashion    Location:     Location:  Right foot    Location detail:  Right big toe  Anesthesia:     Anesthesia:  Local infiltration    Local anesthetic:  Lidocaine 1% without epinephrine  Procedure Details:     Preparation:  Skin prepped with alcohol    Amount removed:  Partial    Side:  Medial    Wedge excision of skin of nail fold: No      Nail bed sutured?: No      Nail matrix removed:  Partial    Removal method:  Phenol and alcohol    Dressing applied:  4x4    Patient tolerance:  Patient tolerated the procedure well with no immediate complications            Counseling:     I provided patient education verbally regarding:   Patient diagnosis,  treatment options, as well as alternatives, risks, and benefits.     This note was created using Dragon voice recognition software that occasionally misinterpreted phrases or words.

## 2023-12-12 NOTE — PROGRESS NOTES
Outpatient Psychiatry Follow-Up Visit    Clinical Status of Patient: Outpatient (Ambulatory)  12/12/2023     Chief Complaint: 76 y/o female presenting today with  for a follow-up.       Interval History and Content of Current Session:  Interim Events/Subjective Report/Content of Current Session: 76 y/o female follow-up appointment.    Pt is a 76 y/o female with past psychiatric hx of depression, anxiety, eating disorder who presents for follow-up treatment. Pt reported that she was evaluated by neuro. Continues to use CPAP. Has been using Ativan BID much of the time. Stated that she has been having panic attacks a little more frequently. Ativan managing these well. Unable to identify triggers. Pt stated that she has stopped going to therapist. No longer thinks it was effective.     Past Psychiatric hx: remeron, rexulti, vraylar, Limbitrol, venlafaxine, fluoxetine, Abilify, gabapentin, amitriptyline, buspirone, cannot remember others.     Past Medical hx:   Past Medical History:   Diagnosis Date    Anticoagulant long-term use     Anxiety     Arthritis     Atrial fibrillation     Basal cell carcinoma     Cancer     skin    CHF (congestive heart failure)     Depression     DVT (deep venous thrombosis)     GERD (gastroesophageal reflux disease)     Glaucoma (increased eye pressure)     Hyperlipidemia     diet controlled    Hypertension     Interstitial lung disease     Localized hives 01/10/2020    Mild persistent asthma without complication 11/12/2018    Pacemaker     Pneumonia 01/31/2014    Stroke 06/03/2014    Stroke     TIA (transient ischemic attack)     TIA (transient ischemic attack)         Interim hx:  Medication changes last visit: Increase ativan 0.5 mg TID PRN  Anxiety: decrease  Depression: decrease     Denies suicidal/homicidal ideations.  Denies hopelessness/worthlessness.    Denies auditory/visual hallucinations      Alcohol: Infrequent use  Drug: Pt denied  Caffeine: Not assessed  Tobacco: Pt  denied      Review of Systems   PSYCHIATRIC: Pertinent items are noted in the narrative.        CONSTITUTIONAL: weight stable    Past Medical, Family and Social History: The patient's past medical, family and social history have been reviewed and updated as appropriate within the electronic medical record. See encounter notes.     Current Psychiatric Medication:  ativan 0.5 mg TID PRN, buspirone 10 mg TID, trintillex 20 mg     Compliance: yes      Side effects: Pt denied     Risk Parameters:  Patient reports no suicidal ideation  Patient reports no homicidal ideation  Patient reports no self-injurious behavior  Patient reports no violent behavior     Exam (detailed: at least 9 elements; comprehensive: all 15 elements)   Constitutional  Vitals:  Most recent vital signs, dated less than 90 days prior to this appointment, were reviewed. BP: ()/()   Arterial Line BP: ()/()       General:  unremarkable, age appropriate, casual attire, good eye contact, good rapport       Musculoskeletal  Muscle Strength/Tone:  no flaccidity, no tremor    Gait & Station:  normal      Psychiatric                       Speech:  normal tone, normal rate, rhythm, and volume   Mood & Affect:   Mildly Depressed, anxious         Thought Process:   Goal directed; Linear    Associations:   intact   Thought Content:   No SI/HI, delusions, or paranoia, no AV/VH   Insight & Judgement:   Good, adequate to circumstances   Orientation:   grossly intact; alert and oriented x 4    Memory:  intact for content of interview    Language:  grossly intact, can repeat    Attention Span  : Grossly intact for content of interview   Fund of Knowledge:   intact and appropriate to age and level of education        Assessment and Diagnosis   Status/Progress: Based on the examination today, the patient's problem(s) is/are adequately controlled.  New problems have not been presented today. Co-morbidities are not complicating management of the primary condition. There  are no active rule-out diagnoses for this patient at this time.      Impression: Pt continues to experience anxiety with infrequent panic attacks. Pt discusses increased pulse concerns and may be associated with increased cognitive worry due to past cardiac concerns. Cognition, verbal responses, tracking conversations, and gait all continue to improve. Will continue with medication plan save for above change and monitor symptoms moving forward.     Diagnosis:   1) Major Depressive Disorder, recurrent, moderate  2) Generalized Anxiety Disorder  3) Specific Phobia  4) Panic Disorder   5) Personality Disorder traits  6) R/o Dementia of unknown etiology  Intervention/Counseling/Treatment Plan   Medication Management:      1. ativan 0.5 mg TID PRN    2. buspirone to 10 mg TID    3. trintillex to 20 mg    4. Call to report any worsening of symptoms or problems with the medication. Pt instructed to go to ER with thoughts of harming self, others     5. Cont in therapy with Graeme Meyer LCSW as needed    Psychotherapy: 25 minutes  Target symptoms: anxiety  Why chosen therapy is appropriate versus another modality: CBT used; relevant to diagnosis, patient responds to this modality  Outcome monitoring methods: self-report, observation  Therapeutic intervention type: Cognitive Behavioral Therapy, Solution-focused  Topics discussed/themes: building skills sets for symptom management, symptom recognition, nutrition, exercise  The patient's response to the intervention is accepting  Patient's response to treatment is: good.   The patient's progress toward treatment goals: limited to fair     Return to clinic: 1 month    -Cognitive-Behavioral/Supportive therapy and psychoeducation provided  -R/B/SE's of medications discussed with the pt who expresses understanding and chooses to take medications as prescribed.   -Pt instructed to call clinic, 911 or go to nearest emergency room if sxs worsen or pt is in   crisis. The pt expresses  understanding.    Galo Lipscomb, PhD, MP     Antidepressant/Antianxiety Medication Initiation:  Patient informed of risks, benefits, and potential side effects of medication and accepts informed consent.  Common side effects include nausea, fatigue, headache, insomnia., Specifically discussed the possibility of new or worsening suicidal thoughts/depression.  Patient instructed to stop the medication immediately and seek urgent treatment if this occurs. Patient instructed not to abruptly discontinue medication without physician guidance except in cases of sudden onset or worsening of SI.       Stimulant Medication Initiation:  Patient advised of risks, benefits, and side effects of medication and accepts informed consent.  Common side effects include insomnia, irritability, jittery feeling, dry mouth, and agitation/hostility., Patient advised of potential addictive nature of medication and controlled substance classification.  Instructed to safeguard medication as no early refills can be given for lost or stolen medications.       Benzodiazepine Initiation:  Patient advised of the risks, benefits, and common side effects of medication and has accepted informed consent.  Common side effects include drowsiness, impaired coordination, possible memory loss., Patient advised NOT to operate a vehicle or machinery untiil they are sure how the medication will affec them.  Client also advised of danger of mixing this medication with alcohol., Patient advised of potential addictive nature of medication and need to safeguard medication as no early refills for lost or stolen medications can be authorized.

## 2023-12-13 ENCOUNTER — OFFICE VISIT (OUTPATIENT)
Dept: PSYCHIATRY | Facility: CLINIC | Age: 78
End: 2023-12-13
Payer: MEDICARE

## 2023-12-13 VITALS
SYSTOLIC BLOOD PRESSURE: 96 MMHG | BODY MASS INDEX: 17.16 KG/M2 | HEART RATE: 59 BPM | HEIGHT: 67 IN | WEIGHT: 109.31 LBS | DIASTOLIC BLOOD PRESSURE: 56 MMHG

## 2023-12-13 DIAGNOSIS — F50.9 EATING DISORDER, UNSPECIFIED TYPE: ICD-10-CM

## 2023-12-13 DIAGNOSIS — F40.298 SPECIFIC PHOBIA: ICD-10-CM

## 2023-12-13 DIAGNOSIS — F41.1 GENERALIZED ANXIETY DISORDER: ICD-10-CM

## 2023-12-13 DIAGNOSIS — F33.1 MAJOR DEPRESSIVE DISORDER, RECURRENT, MODERATE: Primary | ICD-10-CM

## 2023-12-13 DIAGNOSIS — F41.0 PANIC DISORDER: ICD-10-CM

## 2023-12-13 PROCEDURE — 1101F PT FALLS ASSESS-DOCD LE1/YR: CPT | Mod: CPTII,S$GLB,, | Performed by: PSYCHOLOGIST

## 2023-12-13 PROCEDURE — 1159F PR MEDICATION LIST DOCUMENTED IN MEDICAL RECORD: ICD-10-PCS | Mod: CPTII,S$GLB,, | Performed by: PSYCHOLOGIST

## 2023-12-13 PROCEDURE — 1126F PR PAIN SEVERITY QUANTIFIED, NO PAIN PRESENT: ICD-10-PCS | Mod: CPTII,S$GLB,, | Performed by: PSYCHOLOGIST

## 2023-12-13 PROCEDURE — 90833 PR PSYCHOTHERAPY W/PATIENT W/E&M, 30 MIN (ADD ON): ICD-10-PCS | Mod: S$GLB,,, | Performed by: PSYCHOLOGIST

## 2023-12-13 PROCEDURE — 99214 OFFICE O/P EST MOD 30 MIN: CPT | Mod: S$GLB,,, | Performed by: PSYCHOLOGIST

## 2023-12-13 PROCEDURE — 1101F PR PT FALLS ASSESS DOC 0-1 FALLS W/OUT INJ PAST YR: ICD-10-PCS | Mod: CPTII,S$GLB,, | Performed by: PSYCHOLOGIST

## 2023-12-13 PROCEDURE — 1159F MED LIST DOCD IN RCRD: CPT | Mod: CPTII,S$GLB,, | Performed by: PSYCHOLOGIST

## 2023-12-13 PROCEDURE — 1126F AMNT PAIN NOTED NONE PRSNT: CPT | Mod: CPTII,S$GLB,, | Performed by: PSYCHOLOGIST

## 2023-12-13 PROCEDURE — 3288F PR FALLS RISK ASSESSMENT DOCUMENTED: ICD-10-PCS | Mod: CPTII,S$GLB,, | Performed by: PSYCHOLOGIST

## 2023-12-13 PROCEDURE — 1157F PR ADVANCE CARE PLAN OR EQUIV PRESENT IN MEDICAL RECORD: ICD-10-PCS | Mod: CPTII,S$GLB,, | Performed by: PSYCHOLOGIST

## 2023-12-13 PROCEDURE — 3078F PR MOST RECENT DIASTOLIC BLOOD PRESSURE < 80 MM HG: ICD-10-PCS | Mod: CPTII,S$GLB,, | Performed by: PSYCHOLOGIST

## 2023-12-13 PROCEDURE — 3074F PR MOST RECENT SYSTOLIC BLOOD PRESSURE < 130 MM HG: ICD-10-PCS | Mod: CPTII,S$GLB,, | Performed by: PSYCHOLOGIST

## 2023-12-13 PROCEDURE — 1157F ADVNC CARE PLAN IN RCRD: CPT | Mod: CPTII,S$GLB,, | Performed by: PSYCHOLOGIST

## 2023-12-13 PROCEDURE — 99999 PR PBB SHADOW E&M-EST. PATIENT-LVL III: ICD-10-PCS | Mod: PBBFAC,,, | Performed by: PSYCHOLOGIST

## 2023-12-13 PROCEDURE — 99214 PR OFFICE/OUTPT VISIT, EST, LEVL IV, 30-39 MIN: ICD-10-PCS | Mod: S$GLB,,, | Performed by: PSYCHOLOGIST

## 2023-12-13 PROCEDURE — 3078F DIAST BP <80 MM HG: CPT | Mod: CPTII,S$GLB,, | Performed by: PSYCHOLOGIST

## 2023-12-13 PROCEDURE — 3288F FALL RISK ASSESSMENT DOCD: CPT | Mod: CPTII,S$GLB,, | Performed by: PSYCHOLOGIST

## 2023-12-13 PROCEDURE — 99999 PR PBB SHADOW E&M-EST. PATIENT-LVL III: CPT | Mod: PBBFAC,,, | Performed by: PSYCHOLOGIST

## 2023-12-13 PROCEDURE — 90833 PSYTX W PT W E/M 30 MIN: CPT | Mod: S$GLB,,, | Performed by: PSYCHOLOGIST

## 2023-12-13 PROCEDURE — 3074F SYST BP LT 130 MM HG: CPT | Mod: CPTII,S$GLB,, | Performed by: PSYCHOLOGIST

## 2023-12-13 RX ORDER — NAPROXEN SODIUM 220 MG/1
81 TABLET, FILM COATED ORAL DAILY
COMMUNITY

## 2023-12-13 RX ORDER — DORZOLAMIDE HYDROCHLORIDE AND TIMOLOL MALEATE 20; 5 MG/ML; MG/ML
SOLUTION/ DROPS OPHTHALMIC
COMMUNITY

## 2023-12-13 RX ORDER — VORTIOXETINE 20 MG/1
20 TABLET, FILM COATED ORAL DAILY
Qty: 30 TABLET | Refills: 3 | Status: SHIPPED | OUTPATIENT
Start: 2023-12-13 | End: 2024-02-14 | Stop reason: SDUPTHER

## 2023-12-18 ENCOUNTER — CLINICAL SUPPORT (OUTPATIENT)
Dept: DERMATOLOGY | Facility: CLINIC | Age: 78
End: 2023-12-18
Payer: MEDICARE

## 2023-12-18 ENCOUNTER — PATIENT MESSAGE (OUTPATIENT)
Dept: CARDIOLOGY | Facility: CLINIC | Age: 78
End: 2023-12-18
Payer: MEDICARE

## 2023-12-18 DIAGNOSIS — I10 PRIMARY HYPERTENSION: ICD-10-CM

## 2023-12-18 DIAGNOSIS — C44.319 BASAL CELL CARCINOMA (BCC) OF SKIN OF OTHER PART OF FACE: Primary | ICD-10-CM

## 2023-12-18 DIAGNOSIS — I48.0 PAROXYSMAL ATRIAL FIBRILLATION: ICD-10-CM

## 2023-12-18 DIAGNOSIS — F41.1 GAD (GENERALIZED ANXIETY DISORDER): ICD-10-CM

## 2023-12-18 LAB
FINAL PATHOLOGIC DIAGNOSIS: NORMAL
Lab: NORMAL

## 2023-12-18 PROCEDURE — 99024 PR POST-OP FOLLOW-UP VISIT: ICD-10-PCS | Mod: S$GLB,,, | Performed by: DERMATOLOGY

## 2023-12-18 PROCEDURE — 99024 POSTOP FOLLOW-UP VISIT: CPT | Mod: S$GLB,,, | Performed by: DERMATOLOGY

## 2023-12-18 RX ORDER — METOPROLOL TARTRATE 25 MG/1
25 TABLET, FILM COATED ORAL 2 TIMES DAILY
Qty: 180 TABLET | Refills: 1 | Status: SHIPPED | OUTPATIENT
Start: 2023-12-18 | End: 2023-12-20 | Stop reason: ALTCHOICE

## 2023-12-19 ENCOUNTER — OFFICE VISIT (OUTPATIENT)
Dept: OTOLARYNGOLOGY | Facility: CLINIC | Age: 78
End: 2023-12-19
Payer: MEDICARE

## 2023-12-19 ENCOUNTER — LAB VISIT (OUTPATIENT)
Dept: LAB | Facility: HOSPITAL | Age: 78
End: 2023-12-19
Attending: PHYSICIAN ASSISTANT
Payer: MEDICARE

## 2023-12-19 VITALS
HEART RATE: 71 BPM | HEIGHT: 67 IN | BODY MASS INDEX: 17.24 KG/M2 | DIASTOLIC BLOOD PRESSURE: 70 MMHG | SYSTOLIC BLOOD PRESSURE: 122 MMHG | WEIGHT: 109.81 LBS

## 2023-12-19 DIAGNOSIS — G45.9 TIA (TRANSIENT ISCHEMIC ATTACK): ICD-10-CM

## 2023-12-19 DIAGNOSIS — H04.129 EYE DRYNESS: ICD-10-CM

## 2023-12-19 DIAGNOSIS — R68.2 MOUTH DRYNESS: ICD-10-CM

## 2023-12-19 DIAGNOSIS — F41.0 PANIC ATTACKS: ICD-10-CM

## 2023-12-19 DIAGNOSIS — G25.0 BENIGN ESSENTIAL TREMOR: ICD-10-CM

## 2023-12-19 DIAGNOSIS — E04.2 MULTIPLE THYROID NODULES: Primary | ICD-10-CM

## 2023-12-19 DIAGNOSIS — F41.9 ANXIETY: ICD-10-CM

## 2023-12-19 PROCEDURE — 86235 NUCLEAR ANTIGEN ANTIBODY: CPT | Mod: 59 | Performed by: PHYSICIAN ASSISTANT

## 2023-12-19 PROCEDURE — 3074F PR MOST RECENT SYSTOLIC BLOOD PRESSURE < 130 MM HG: ICD-10-PCS | Mod: CPTII,S$GLB,, | Performed by: PHYSICIAN ASSISTANT

## 2023-12-19 PROCEDURE — 1126F PR PAIN SEVERITY QUANTIFIED, NO PAIN PRESENT: ICD-10-PCS | Mod: CPTII,S$GLB,, | Performed by: PHYSICIAN ASSISTANT

## 2023-12-19 PROCEDURE — 1157F ADVNC CARE PLAN IN RCRD: CPT | Mod: CPTII,S$GLB,, | Performed by: PHYSICIAN ASSISTANT

## 2023-12-19 PROCEDURE — 1126F AMNT PAIN NOTED NONE PRSNT: CPT | Mod: CPTII,S$GLB,, | Performed by: PHYSICIAN ASSISTANT

## 2023-12-19 PROCEDURE — 99205 PR OFFICE/OUTPT VISIT, NEW, LEVL V, 60-74 MIN: ICD-10-PCS | Mod: S$GLB,,, | Performed by: PHYSICIAN ASSISTANT

## 2023-12-19 PROCEDURE — 1101F PR PT FALLS ASSESS DOC 0-1 FALLS W/OUT INJ PAST YR: ICD-10-PCS | Mod: CPTII,S$GLB,, | Performed by: PHYSICIAN ASSISTANT

## 2023-12-19 PROCEDURE — 1160F PR REVIEW ALL MEDS BY PRESCRIBER/CLIN PHARMACIST DOCUMENTED: ICD-10-PCS | Mod: CPTII,S$GLB,, | Performed by: PHYSICIAN ASSISTANT

## 2023-12-19 PROCEDURE — 3288F FALL RISK ASSESSMENT DOCD: CPT | Mod: CPTII,S$GLB,, | Performed by: PHYSICIAN ASSISTANT

## 2023-12-19 PROCEDURE — 1160F RVW MEDS BY RX/DR IN RCRD: CPT | Mod: CPTII,S$GLB,, | Performed by: PHYSICIAN ASSISTANT

## 2023-12-19 PROCEDURE — 1101F PT FALLS ASSESS-DOCD LE1/YR: CPT | Mod: CPTII,S$GLB,, | Performed by: PHYSICIAN ASSISTANT

## 2023-12-19 PROCEDURE — 36415 COLL VENOUS BLD VENIPUNCTURE: CPT | Mod: PO | Performed by: PHYSICIAN ASSISTANT

## 2023-12-19 PROCEDURE — 3074F SYST BP LT 130 MM HG: CPT | Mod: CPTII,S$GLB,, | Performed by: PHYSICIAN ASSISTANT

## 2023-12-19 PROCEDURE — 99999 PR PBB SHADOW E&M-EST. PATIENT-LVL V: CPT | Mod: PBBFAC,,, | Performed by: PHYSICIAN ASSISTANT

## 2023-12-19 PROCEDURE — 3078F PR MOST RECENT DIASTOLIC BLOOD PRESSURE < 80 MM HG: ICD-10-PCS | Mod: CPTII,S$GLB,, | Performed by: PHYSICIAN ASSISTANT

## 2023-12-19 PROCEDURE — 1159F MED LIST DOCD IN RCRD: CPT | Mod: CPTII,S$GLB,, | Performed by: PHYSICIAN ASSISTANT

## 2023-12-19 PROCEDURE — 1159F PR MEDICATION LIST DOCUMENTED IN MEDICAL RECORD: ICD-10-PCS | Mod: CPTII,S$GLB,, | Performed by: PHYSICIAN ASSISTANT

## 2023-12-19 PROCEDURE — 3288F PR FALLS RISK ASSESSMENT DOCUMENTED: ICD-10-PCS | Mod: CPTII,S$GLB,, | Performed by: PHYSICIAN ASSISTANT

## 2023-12-19 PROCEDURE — 86038 ANTINUCLEAR ANTIBODIES: CPT | Performed by: PHYSICIAN ASSISTANT

## 2023-12-19 PROCEDURE — 86235 NUCLEAR ANTIGEN ANTIBODY: CPT | Performed by: PHYSICIAN ASSISTANT

## 2023-12-19 PROCEDURE — 99999 PR PBB SHADOW E&M-EST. PATIENT-LVL V: ICD-10-PCS | Mod: PBBFAC,,, | Performed by: PHYSICIAN ASSISTANT

## 2023-12-19 PROCEDURE — 99205 OFFICE O/P NEW HI 60 MIN: CPT | Mod: S$GLB,,, | Performed by: PHYSICIAN ASSISTANT

## 2023-12-19 PROCEDURE — 3078F DIAST BP <80 MM HG: CPT | Mod: CPTII,S$GLB,, | Performed by: PHYSICIAN ASSISTANT

## 2023-12-19 PROCEDURE — 1157F PR ADVANCE CARE PLAN OR EQUIV PRESENT IN MEDICAL RECORD: ICD-10-PCS | Mod: CPTII,S$GLB,, | Performed by: PHYSICIAN ASSISTANT

## 2023-12-19 NOTE — PROGRESS NOTES
[facility]    Subjective:      Patient: Ayla Dela Cruz Patient PCP: Jan Olivo MD         :  1945     Sex:  female      MRN:  [id number]          Date of Visit: 2023      Chief Complaint: Swallowing issues    Patient ID: Ayla Dela Cruz is a 78 y.o. female with PMHx stroke, JOSE, depression, anorexia nervosa, glaucoma, HTN, HLD, LVH, GREG, GERD, NILSA-on CPAP who presents to office with main complaints of issues swallowing and dry mouth. Pt presented to ED 10/26/2023 due to left facial numbness and was placed inpatient. She was advised she had a TIA. During the course of her stay, she was seen by speech therapy-[facility] for cognitive linguistic impairment. Moderate intensity therapy was recommended. She had bedside swallow study done that was normal 10/26/2023. Pt states that she feels like her throat her swollen or closing up when she tries to swallow, but she is able to swallow. Pt states that her issues stem from a fear of swallowing. Pt states she was given an anti-anxiety medication and she was able to swallow. MRI Brain WO 2023 notes mild cerebral and cerebellar atrophy. Mild chronic small vessel ischemic changes. CTA head and neck 10/25/2023 shows beaded appearance of the right proximal ICA. Bilateral thyroid nodules noted. Recent TSH 10/25/2023 WNL. Pt has NILSA and is on CPAP. Pt is using her humidity setting with distilled water. Pt has dry throat and dry eyes. Pt states she was not staying well-hydrated. She is not staying well hydrated. Pt denies h/o autoimmune disorders. Pt had tonsillectomy when she was around 4 years of age. Biotene lozenges and mouth rinse do not help. Pt has developed fear of choking from watching her grandmother choke when she was in her early 40s and she had to do the heimlich maneuver to save her grandmother.     Past Medical History  She has a past medical history of Anticoagulant long-term use, Anxiety, Arthritis, Atrial  fibrillation, Basal cell carcinoma, Cancer, CHF (congestive heart failure), Depression, DVT (deep venous thrombosis), GERD (gastroesophageal reflux disease), Glaucoma (increased eye pressure), Hyperlipidemia, Hypertension, Interstitial lung disease, Localized hives, Mild persistent asthma without complication, Pacemaker, Pneumonia, Stroke, Stroke, TIA (transient ischemic attack), and TIA (transient ischemic attack).    Family History  Her family history includes Alzheimer's disease in her maternal uncle; Arthritis in her mother; Asthma in her mother; Cancer in her maternal grandfather and paternal grandmother; Depression in her son; Emphysema in her maternal grandfather; Heart disease in her father; Kidney disease in her maternal grandfather; Pneumonia in her mother and paternal grandfather; Rheum arthritis in her maternal grandmother and mother; Skin cancer in her mother; Ulcers in her father.    Past Surgical History:   Procedure Laterality Date    2 heart ablations      3 in total    A-V CARDIAC PACEMAKER INSERTION Left 10/14/2021    Procedure: INSERTION, CARDIAC PACEMAKER, DUAL CHAMBER;  Surgeon: Fco Calderon MD;  Location: [facility] EP LAB;  Service: Cardiology;  Laterality: Left;  PAF/ [place] Arrthymias, Dual PPM, SJM, MAC, GP, 3 PREP    ANTERIOR VITRECTOMY Right 7/27/2022    Procedure: VITRECTOMY, ANTERIOR APPROACH;  Surgeon: Daniel Tan MD;  Location: [facility] OR;  Service: Ophthalmology;  Laterality: Right;    bilateral cataracts      CHOLECYSTECTOMY      COLONOSCOPY N/A 1/19/2017    Procedure: COLONOSCOPY and EGD;  Surgeon: Jonathan Schultz MD;  Location: [facility];  Service: Endoscopy;  Laterality: N/A;    COLONOSCOPY N/A 10/10/2019    Procedure: COLONOSCOPY;  Surgeon: Alex Christian MD;  Location: [facility];  Service: Endoscopy;  Laterality: N/A;    ESOPHAGOGASTRODUODENOSCOPY N/A 10/14/2019    Procedure: EGD (ESOPHAGOGASTRODUODENOSCOPY);  Surgeon: Alex Christian MD;  Location: [facility];  Service:  Endoscopy;  Laterality: N/A;    INJECTION OF ANESTHETIC AGENT AROUND MEDIAL BRANCH NERVES INNERVATING CERVICAL FACET JOINT Right 6/7/2018    Procedure: BLOCK, NERVE, FACET JOINT, MEDIAL BRANCH, CERVICAL;  Surgeon: Galo Clancy MD;  Location: [facility] OR;  Service: Pain Management;  Laterality: Right;  C4, 5, 6    OPEN REDUCTION AND INTERNAL FIXATION (ORIF) OF INJURY OF ANKLE Right 11/1/2018    Procedure: ORIF, ANKLE;  Surgeon: Wilfredo Aguero MD;  Location: [facility] OR;  Service: Orthopedics;  Laterality: Right;    PARACENTESIS, EYE, ANTERIOR CHAMBER, WITH AQUEOUS REMOVAL Right 7/27/2022    Procedure: Anterior chamber washout, possible IOL removal;  Surgeon: Daniel Tan MD;  Location: [facility] OR;  Service: Ophthalmology;  Laterality: Right;    pyloristenosis      RADIOFREQUENCY ABLATION OF LUMBAR MEDIAL BRANCH NERVE AT SINGLE LEVEL Bilateral 9/21/2018    Procedure: RADIOFREQUENCY ABLATION, NERVE, SPINAL, LUMBAR, MEDIAL BRANCH, 1 LEVEL;  Surgeon: Galo Clancy MD;  Location: [facility];  Service: Pain Management;  Laterality: Bilateral;  L3, 4, 5    RADIOFREQUENCY ABLATION OF LUMBAR MEDIAL BRANCH NERVE AT SINGLE LEVEL Bilateral 2/19/2019    Procedure: Radiofrequency Ablation, Nerve, Spinal, Lumbar, Medial Branch, 1 Level;  Surgeon: Galo Clancy MD;  Location: [facility] OR;  Service: Pain Management;  Laterality: Bilateral;  L3, 4, 5     RADIOFREQUENCY ABLATION OF LUMBAR MEDIAL BRANCH NERVE AT SINGLE LEVEL Bilateral 3/10/2020    Procedure: Radiofrequency Ablation, Nerve, Spinal, Lumbar, Medial Branch, 1 Level;  Surgeon: Gaol Clancy MD;  Location: [facility] OR;  Service: Pain Management;  Laterality: Bilateral;  L3,4,5 - Burned at 80 degrees C. for 60 seconds x 2 each site      RADIOFREQUENCY ABLATION OF LUMBAR MEDIAL BRANCH NERVE AT SINGLE LEVEL Bilateral 9/11/2020    Procedure: Radiofrequency Ablation, Nerve, Spinal, Lumbar, Medial Branch, 1 Level;  Surgeon: Galo Clancy MD;  Location: [facility] OR;  Service: Pain Management;  Laterality:  Bilateral;  L3, 4, 5 - Burned at 80 degrees C. for 60 seconds x 2 each site    RADIOFREQUENCY ABLATION OF LUMBAR MEDIAL BRANCH NERVE AT SINGLE LEVEL Bilateral 5/28/2021    Procedure: Radiofrequency Ablation, Nerve, Spinal, Lumbar, Medial Branch, 1 Level;  Surgeon: Galo Clancy MD;  Location: [facility] OR;  Service: Pain Management;  Laterality: Bilateral;  L3,4,5    RADIOFREQUENCY THERMAL COAGULATION OF MEDIAL BRANCH OF POSTERIOR RAMUS OF CERVICAL SPINAL NERVE Right 7/3/2018    Procedure: RADIOFREQUENCY THERMAL COAGULATION, NERVE, SPINAL, CERVICAL, MEDIAL BRANCH OF POSTERIOR RAMUS;  Surgeon: Galo Clancy MD;  Location: [facility] OR;  Service: Pain Management;  Laterality: Right;  C4,5,6 - Burned at 80 degrees C. for 75 seconds each site    RADIOFREQUENCY THERMAL COAGULATION OF MEDIAL BRANCH OF POSTERIOR RAMUS OF CERVICAL SPINAL NERVE Right 7/23/2019    Procedure: RADIOFREQUENCY THERMAL COAGULATION, NERVE, SPINAL, CERVICAL, POSTERIOR RAMUS, MEDIAL BRANCH;  Surgeon: Galo Clancy MD;  Location: [facility] OR;  Service: Pain Management;  Laterality: Right;  C4,5,6    RADIOFREQUENCY THERMAL COAGULATION OF MEDIAL BRANCH OF POSTERIOR RAMUS OF CERVICAL SPINAL NERVE Right 6/23/2020    Procedure: RADIOFREQUENCY THERMAL COAGULATION, NERVE, SPINAL, CERVICAL, POSTERIOR RAMUS, MEDIAL BRANCH;  Surgeon: Galo Clancy MD;  Location: [facility] OR;  Service: Pain Management;  Laterality: Right;  C4, 5, 6    RADIOFREQUENCY THERMOCOAGULATION Bilateral 9/10/2019    Procedure: RADIOFREQUENCY THERMAL COAGULATION LUMBAR;  Surgeon: Galo Clancy MD;  Location: [facility] OR;  Service: Pain Management;  Laterality: Bilateral;  L3,4,5 - Burned at 80 degrees C. for 60 seconds x 2 each site    skin cancer removal       TONSILLECTOMY       Social History     Tobacco Use    Smoking status: Never    Smokeless tobacco: Never   Substance and Sexual Activity    Alcohol use: No    Drug use: No    Sexual activity: Yes     Partners: Male     Medications  She has a current medication list  which includes the following prescription(s): apixaban, aspirin, atropine 1%, buspirone, dorzolamide-timolol 2-0.5%, latanoprost, lorazepam, metoprolol tartrate, pantoprazole, triamcinolone acetonide 0.1%, trintellix, and [DISCONTINUED] brimonidine 0.2%, and the following Facility-Administered Medications: bupivacaine (pf) 0.25% (2.5 mg/ml), lidocaine (pf) 10 mg/ml (1%), lidocaine (pf) 20 mg/ml (2%), and methylprednisolone acetate.    Review of patient's allergies indicates:   Allergen Reactions    Gabapentin Hallucinations    Xarelto [rivaroxaban] Rash    Bactrim [sulfamethoxazole-trimethoprim] Other (See Comments)     Upset stomach, dry heaves, confusion    Afrin (pseudoephedrine)     Amoxicillin-pot clavulanate      Other reaction(s): Mental Status Change    Atorvastatin      Other reaction(s): Joint pain    Baclofen     Baclofen (bulk) Nausea And Vomiting    Ciprofloxacin     Decongest tabs      Other reaction(s): increased heart rate    Decongestant [pseudoephedrine hcl]     Erythromycin Other (See Comments)    Flecainide Hives     And SOB. No reaction to Lidocaine     Fluoxetine      Other reaction(s): heart palpitations  Other reaction(s): anxiety    Lisinopril Other (See Comments)     cough    Losartan Other (See Comments)     Hypotension with lightheadedness    Morphine Other (See Comments)     confusion    Tramadol Other (See Comments)     SOB, low BP    Venlafaxine     Venlafaxine analogues      Changes in BP and increases heart rate       Afrin [oxymetazoline] Palpitations    Caffeine Palpitations    Dabigatran etexilate Rash    Plavix [clopidogrel] Rash    Tizanidine Anxiety     dizziness     All medications, allergies, and past history have been reviewed.    Objective:      Vitals:      12/11/2023     4:09 PM 12/13/2023     1:47 PM 12/19/2023     9:25 AM   Vitals - 1 value per visit   SYSTOLIC   122   DIASTOLIC   70   Pulse 77  71   SPO2 100 %     Weight (lb) 107.58  109.79   Weight (kg) 48.8  49.8  "  Height 5' 7" (1.702 m)  5' 7" (1.702 m)   BMI (Calculated) 16.8  17.2   Pain Score Five  Zero       Information is confidential and restricted. Go to Review Flowsheets to unlock data.       Body surface area is 1.53 meters squared.  Physical Exam  Constitutional:       General: She is not in acute distress.     Appearance: Normal appearance. She is not ill-appearing.   HENT:      Head: Normocephalic and atraumatic.      Right Ear: Tympanic membrane, ear canal and external ear normal.      Left Ear: Tympanic membrane, ear canal and external ear normal.      Nose: Septal deviation (rightward deviation) present.      Mouth/Throat:      Lips: Pink. No lesions.      Mouth: Mucous membranes are moist. No oral lesions.      Tongue: No lesions.      Palate: No lesions.      Pharynx: Oropharynx is clear. Uvula midline. No pharyngeal swelling, oropharyngeal exudate, posterior oropharyngeal erythema or uvula swelling.      Tonsils: 0 on the right. 0 on the left.      Comments: S/p tonsillectomy.    Involuntary action tremor of tongue consistent with benign essential tremor.  Eyes:      General:         Right eye: No discharge.         Left eye: No discharge.      Extraocular Movements: Extraocular movements intact.      Conjunctiva/sclera: Conjunctivae normal.   Pulmonary:      Effort: Pulmonary effort is normal.   Neurological:      Mental Status: She is alert and oriented to person, place, and time. Mental status is at baseline.      Comments: Involuntary action tremors of hands bilaterally consistent with benign essential tremor.     Unsteady shaky voice in setting of benign essential tremor of hands bilaterally and tongue consistent with sound of vocal tremor.     Labs:  WBC   Date Value Ref Range Status   10/29/2023 8.84 3.90 - 12.70 K/uL Final     Eosinophil %   Date Value Ref Range Status   10/29/2023 3.1 0.0 - 8.0 % Final     Eos #   Date Value Ref Range Status   10/29/2023 0.3 0.0 - 0.5 K/uL Final     Platelets "   Date Value Ref Range Status   10/29/2023 258 150 - 450 K/uL Final     Glucose   Date Value Ref Range Status   10/29/2023 92 70 - 110 mg/dL Final     Imaging:  CTA Head and Neck (xpd)  Order: [id number]  Status: Final result       Visible to patient: Yes (not seen)       Next appt: Today at 08:45 AM in Otolaryngology (Xavier Trejo PA-C)    0 Result Notes  Details    Reading Physician Reading Date Result Priority   Bryant Alexandra MD  [phone]  [phone] 10/25/2023 Routine     Narrative & Impression  EXAMINATION:  CTA HEAD AND NECK (XPD)     CLINICAL HISTORY:  Stroke, follow up;     TECHNIQUE:  Non contrast low dose axial images were obtained through the head.  CT angiogram was performed from the level of the ortega to the top of the head following the IV administration of 75mL of Omnipaque 350.   Sagittal and coronal reconstructions and maximum intensity projection reconstructions were performed. Arterial stenosis percentages are based on NASCET measurement criteria.     COMPARISON:  CT earlier same day     FINDINGS:  Normal size ventricular system.  Mild cerebral involutional change and periventricular white matter disease.  Near complete opacification right sphenoid sinus.  Mastoid air cells and paranasal sinuses are clear.  No abnormal intracranial enhancement.  No mass or abnormal enhancement of the aero digestive tract.  There is a 1.7 cm peripherally enhancing lesion along the right thyroid lobe, posterior margin.  There are a few additional subcentimeter hypodense lesions in the left thyroid lobe.  Remaining glandular tissue in the neck unremarkable.  Partially imaged cardiac conduction device.  No cervical adenopathy.  Multilevel cervical spine degenerative changes.     Normal 3 vessel arch.  Bilateral subclavian arteries are patent.  Right common carotid artery is patent.  Plaque along the right carotid bulb with less than 50% stenosis.  Patent right ICA with luminal irregularity or  beading in the proximal segment as seen coronal series 609, image 43 and sagittal series 610, image 57..  Patent left common carotid artery.  Plaque left carotid bulb with less than 50% stenosis.  Patent left ICA.  Bilateral MCAs and ACAs are patent.  Right P1 artery is diminutive, likely on a congenital basis..  There is an anterior communicating artery.     Right vertebral artery origin and course are patent.  Left vertebral artery origin and course are patent.  There is slight left vertebral artery dominant.  Basilar artery and PCAs are patent.     Impression:     1. Beaded appearance of the right proximal and mid ICA reason the possibility from fibromuscular dysplasia.  Atherosclerosis is in the differential although plaque is not clearly evident.  2. No aneurysm or hemodynamically significant stenosis involving the head and neck arterial vasculature.  3. Mild generalized cerebral atrophy and white matter disease.  4. Bilateral thyroid nodules.  Consider ultrasound in an elective setting for further characterization.       MRI Brain without contrast Results for orders placed during the hospital encounter of 11/09/23    MRI Brain Without Contrast    Narrative  Reason: TIA,  PAROXYSMAL ATRIAL FIB ,  CARDIAC PACEMAKER,  EPISTAXIS, CHROINIC CERVICAL FIBORMUSCULAR DYSPLASISA TIA    TECHNIQUE: MRI brain without IV contrast    COMPARISON: None    FINDINGS:  Diffusion-weighted imaging is normal.    Mild cerebral and cerebellar atrophy is evident. Periventricular and subcortical white matter T2 hyperintensity suggest gliosis from chronic small vessel ischemic changes. No intracranial mass effect or midline shift. Ventricles and cisterns are maintained. Major intracranial flow voids are unremarkable.    Calvarial bone marrow signal is normal. Sinuses and mastoids are clear.    IMPRESSION:    1. No acute intracranial abnormality.  2. Mild chronic small vessel ischemic changes.          All lab results, imaging results, and  data have been reviewed.    Assessment:        ICD-10-CM ICD-9-CM   1. Mouth dryness  R68.2 527.7   2. Eye dryness  H04.129 375.15   3. TIA (transient ischemic attack)  G45.9 435.9   4. Panic attacks  F41.0 300.01   5. Anxiety  F41.9 300.00   6. Benign essential tremor  G25.0 333.1              Plan:      Mouth dryness/Eye dryness  -     LAWRENCE; Future; Expected date: 12/19/2023  -     ANTI-SSB ANTIBODY; Future; Expected date: 12/19/2023  -     ANTI -SSA ANTIBODY; Future; Expected date: 12/19/2023  - Continue biotene lozenges and mouthwash. Will assess for rheumatological/auto-immune disorder, especially sjogren's.     TIA (transient ischemic attack)  -     Pt presented to ED 10/26/2023 due to left facial numbness and was placed inpatient. She was advised she had a TIA. During the course of her stay, she was seen by speech therapy-[facility] for cognitive linguistic impairment. Moderate intensity therapy was recommended. She had bedside swallow study done that was normal 10/26/2023. Pt states that she was supposed to follow up with speech pathologist. She was lost to follow up. Orders placed for speech pathology.     Panic attacks/Anxiety  -     Pt denies issues with dysphagia/odynophagia. She has fear of choking. Pt states that she feels like her throat her swollen or closing up when she tries to swallow, but she is able to swallow. Pt states that her issues stem from a fear of swallowing. Pt states she was given an anti-anxiety medication and she was able to swallow. Pt has developed fear of choking from watching her grandmother choke when she was in her early 40s and she had to do the heimlich maneuver to save her grandmother. I had a long conversation with pt and her  regarding her current issues. It is not choking itself that she suffers from. It stimulates from more of a fear of choking and panic attack. Pt is followed by a psychiatrist Dr. Lipscomb for MDD, panic disorder and JOSE. Pt  advised that I also suggest a psychologist for possible CBT or trauma counseling, as I think this would be beneficial due to pt's issues as well as seeing her psychiatrist. Psychology referral placed. We will defer on laryngoscopy today due to her swallowing issues not being from anatomical issue but more of psychological issue.     Benign essential tremor  -Pt has involuntary action tremor of tongue consistent with benign essential tremor. Pt has involuntary action tremors of hands bilaterally consistent with benign essential tremor. She also has unsteady shaky voice in setting of benign essential tremor of hands bilaterally and tongue consistent with sound of vocal tremor. I will hold on laryngoscopy today due to the fact that pt's essential tremor is noticeable and her voice quality matches that of someone with vocal tremor and she already has anxiety. Pt does not have issues with her voice that she is concerned about. She does follow with neurology Dr[facility] Suarez at [facility]. I have advised her that I recommend that she follow up with Dr. Suarez and discuss possibility of starting propanolol for benign essential tremor and that this can also have dual effect and help with anxiety. I have provided pt with name of medication (propanolol) via her AVS.    Multiple thyroid nodules  -CTA head and neck shows bilateral thyroid nodules. Most recent TSH 10/25/2023 WNL. Will have pt proceed with thyroid US to further assess.     Pt may follow up as needed for ENT issues/concerns.

## 2023-12-20 ENCOUNTER — OFFICE VISIT (OUTPATIENT)
Dept: CARDIOLOGY | Facility: CLINIC | Age: 78
End: 2023-12-20
Payer: MEDICARE

## 2023-12-20 ENCOUNTER — TELEPHONE (OUTPATIENT)
Dept: OTOLARYNGOLOGY | Facility: CLINIC | Age: 78
End: 2023-12-20
Payer: MEDICARE

## 2023-12-20 VITALS
HEART RATE: 64 BPM | BODY MASS INDEX: 17.11 KG/M2 | WEIGHT: 109 LBS | OXYGEN SATURATION: 99 % | SYSTOLIC BLOOD PRESSURE: 123 MMHG | HEIGHT: 67 IN | DIASTOLIC BLOOD PRESSURE: 60 MMHG

## 2023-12-20 DIAGNOSIS — I50.32 CHRONIC DIASTOLIC HEART FAILURE: ICD-10-CM

## 2023-12-20 DIAGNOSIS — E44.0 MODERATE PROTEIN-CALORIE MALNUTRITION: ICD-10-CM

## 2023-12-20 DIAGNOSIS — I48.0 PAROXYSMAL ATRIAL FIBRILLATION: Primary | ICD-10-CM

## 2023-12-20 DIAGNOSIS — F41.0 PANIC ATTACKS: ICD-10-CM

## 2023-12-20 DIAGNOSIS — Z91.89 AT RISK FOR CARDIOVASCULAR EVENT: ICD-10-CM

## 2023-12-20 DIAGNOSIS — Z95.0 CARDIAC PACEMAKER IN SITU: ICD-10-CM

## 2023-12-20 DIAGNOSIS — R41.89 COGNITIVE IMPAIRMENT: ICD-10-CM

## 2023-12-20 DIAGNOSIS — R54 FRAIL ELDERLY: ICD-10-CM

## 2023-12-20 DIAGNOSIS — Z79.01 LONG TERM (CURRENT) USE OF ANTICOAGULANTS: ICD-10-CM

## 2023-12-20 DIAGNOSIS — G47.33 OSA ON CPAP: ICD-10-CM

## 2023-12-20 DIAGNOSIS — R53.82 CHRONIC FATIGUE: ICD-10-CM

## 2023-12-20 LAB
ANA SER QL IF: NORMAL
ANTI-SSA ANTIBODY: 0.06 RATIO (ref 0–0.99)
ANTI-SSA INTERPRETATION: NEGATIVE
ANTI-SSB ANTIBODY: 0.06 RATIO (ref 0–0.99)
ANTI-SSB INTERPRETATION: NEGATIVE

## 2023-12-20 PROCEDURE — 1126F PR PAIN SEVERITY QUANTIFIED, NO PAIN PRESENT: ICD-10-PCS | Mod: CPTII,S$GLB,, | Performed by: INTERNAL MEDICINE

## 2023-12-20 PROCEDURE — 3078F PR MOST RECENT DIASTOLIC BLOOD PRESSURE < 80 MM HG: ICD-10-PCS | Mod: CPTII,S$GLB,, | Performed by: INTERNAL MEDICINE

## 2023-12-20 PROCEDURE — 3288F PR FALLS RISK ASSESSMENT DOCUMENTED: ICD-10-PCS | Mod: CPTII,S$GLB,, | Performed by: INTERNAL MEDICINE

## 2023-12-20 PROCEDURE — 99215 PR OFFICE/OUTPT VISIT, EST, LEVL V, 40-54 MIN: ICD-10-PCS | Mod: 25,S$GLB,, | Performed by: INTERNAL MEDICINE

## 2023-12-20 PROCEDURE — 3074F SYST BP LT 130 MM HG: CPT | Mod: CPTII,S$GLB,, | Performed by: INTERNAL MEDICINE

## 2023-12-20 PROCEDURE — 1159F MED LIST DOCD IN RCRD: CPT | Mod: CPTII,S$GLB,, | Performed by: INTERNAL MEDICINE

## 2023-12-20 PROCEDURE — 99999 PR PBB SHADOW E&M-EST. PATIENT-LVL IV: CPT | Mod: PBBFAC,,, | Performed by: INTERNAL MEDICINE

## 2023-12-20 PROCEDURE — 1101F PT FALLS ASSESS-DOCD LE1/YR: CPT | Mod: CPTII,S$GLB,, | Performed by: INTERNAL MEDICINE

## 2023-12-20 PROCEDURE — 1101F PR PT FALLS ASSESS DOC 0-1 FALLS W/OUT INJ PAST YR: ICD-10-PCS | Mod: CPTII,S$GLB,, | Performed by: INTERNAL MEDICINE

## 2023-12-20 PROCEDURE — 1159F PR MEDICATION LIST DOCUMENTED IN MEDICAL RECORD: ICD-10-PCS | Mod: CPTII,S$GLB,, | Performed by: INTERNAL MEDICINE

## 2023-12-20 PROCEDURE — 3074F PR MOST RECENT SYSTOLIC BLOOD PRESSURE < 130 MM HG: ICD-10-PCS | Mod: CPTII,S$GLB,, | Performed by: INTERNAL MEDICINE

## 2023-12-20 PROCEDURE — 3078F DIAST BP <80 MM HG: CPT | Mod: CPTII,S$GLB,, | Performed by: INTERNAL MEDICINE

## 2023-12-20 PROCEDURE — 93000 EKG 12-LEAD: ICD-10-PCS | Mod: S$GLB,,, | Performed by: INTERNAL MEDICINE

## 2023-12-20 PROCEDURE — 1157F PR ADVANCE CARE PLAN OR EQUIV PRESENT IN MEDICAL RECORD: ICD-10-PCS | Mod: CPTII,S$GLB,, | Performed by: INTERNAL MEDICINE

## 2023-12-20 PROCEDURE — 99999 PR PBB SHADOW E&M-EST. PATIENT-LVL IV: ICD-10-PCS | Mod: PBBFAC,,, | Performed by: INTERNAL MEDICINE

## 2023-12-20 PROCEDURE — 93000 ELECTROCARDIOGRAM COMPLETE: CPT | Mod: S$GLB,,, | Performed by: INTERNAL MEDICINE

## 2023-12-20 PROCEDURE — 99215 OFFICE O/P EST HI 40 MIN: CPT | Mod: 25,S$GLB,, | Performed by: INTERNAL MEDICINE

## 2023-12-20 PROCEDURE — 1126F AMNT PAIN NOTED NONE PRSNT: CPT | Mod: CPTII,S$GLB,, | Performed by: INTERNAL MEDICINE

## 2023-12-20 PROCEDURE — 1157F ADVNC CARE PLAN IN RCRD: CPT | Mod: CPTII,S$GLB,, | Performed by: INTERNAL MEDICINE

## 2023-12-20 PROCEDURE — 3288F FALL RISK ASSESSMENT DOCD: CPT | Mod: CPTII,S$GLB,, | Performed by: INTERNAL MEDICINE

## 2023-12-20 RX ORDER — PROPRANOLOL HYDROCHLORIDE 40 MG/1
40 TABLET ORAL 2 TIMES DAILY
Qty: 60 TABLET | Refills: 11 | Status: SHIPPED | OUTPATIENT
Start: 2023-12-20 | End: 2024-12-19

## 2023-12-20 NOTE — PROGRESS NOTES
Subjective:    Patient ID:  Ayla Dela Cruz is a 78 y.o. 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 Significant weight loss due to major depression off Limbitrol (stopped 2010), diet controlled T2DM. 3-months follow up.  PCP: Dr. Olivo, see annually, do labs  Opthalmologist: Daniel Tan MD  EP: Dr. Calderon, last seen 3/2023  Orthopedic: Dr. Aguero  Surgeon: Dr. Gonsalez, and Dr. Dc  Rheumatologist: Dr. Pak  Prior cardiologist: Dr. Gibson, last seen over a year  Pulmonary: Marichuy Mcelroy, AGUILAR  Psychologist: Galo Lipscomb, PhD, MP  Gyn: Dr. Jordan  GI: Dr. Schultz  Neurologist: Dr. Ramirez  Dermatologist: Dennis Corrigan  Pain: Dr. 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  Diet: regular, poor appetite  Caffeine: none  Labs: 1/2019 LDL 90.6 on Crestor 40 mg, normal TSH, CMP (albumin 3.1), normal CBC, Vit. D  10/2019 AST 95, Hgb 11.4  Lab Results   Component Value Date    TSH 1.078 10/25/2023        Lab Results   Component Value Date    HGBA1C 5.6 10/05/2023       Normal H&H in 11/15/2021  Lab Results   Component Value Date    WBC 8.84 10/29/2023    HGB 14.3 10/29/2023    HCT 42.1 10/29/2023    MCV 94 10/29/2023      10/29/2023       CMP  Sodium   Date Value Ref Range Status   10/29/2023 140 136 - 145 mmol/L Final     Potassium   Date Value Ref Range Status   10/29/2023 3.7 3.5 - 5.1 mmol/L Final     Chloride   Date Value Ref Range Status   10/29/2023 109 95 - 110 mmol/L Final     CO2   Date Value Ref Range Status   10/29/2023 19 (L) 23 - 29 mmol/L Final     Glucose   Date Value Ref Range Status   10/29/2023 92 70 - 110 mg/dL Final     BUN   Date Value Ref Range Status   10/29/2023 13 8 - 23 mg/dL Final     Creatinine   Date Value Ref Range Status   10/29/2023 0.8 0.5 - 1.4 mg/dL Final   09/24/2012 0.6 0.2 - 1.4 mg/dl Final     Calcium   Date Value Ref Range Status   10/29/2023 9.7 8.7 - 10.5 mg/dL Final   09/24/2012 9.9 8.6 - 10.2 mg/dl Final     Total Protein   Date Value Ref Range Status   10/29/2023 6.8 6.0 - 8.4 g/dL Final     Albumin   Date Value Ref Range Status   10/29/2023 3.8 3.5 - 5.2 g/dL Final     Total Bilirubin   Date Value Ref Range Status   10/29/2023 0.9 0.1 - 1.0 mg/dL Final     Comment:     For infants and newborns, interpretation of results should be based  on gestational age, weight and in agreement with clinical  observations.    Premature Infant recommended reference ranges:  Up to 24 hours.............<8.0 mg/dL  Up to 48 hours............<12.0 mg/dL  3-5 days..................<15.0 mg/dL  6-29 days.................<15.0 mg/dL       Alkaline Phosphatase   Date Value Ref Range Status   10/29/2023 70 55 - 135 U/L Final   09/24/2012 63 23 - 119 UNIT/L Final     AST   Date Value Ref Range Status   10/29/2023 23 10 - 40 U/L Final   09/24/2012 26 10 - 30 UNIT/L Final     ALT   Date Value Ref Range Status   10/29/2023 16 10 - 44 U/L Final     Anion Gap   Date Value Ref Range Status   10/29/2023 12 8 - 16 mmol/L Final   09/24/2012 12 5 - 15 meq/L Final     eGFR if    Date Value Ref Range Status   07/23/2022 >60.0 >60 mL/min/1.73 m^2 Final     eGFR if non    Date Value Ref Range  Status   07/23/2022 >60.0 >60 mL/min/1.73 m^2 Final     Comment:     Calculation used to obtain the estimated glomerular filtration  rate (eGFR) is the CKD-EPI equation.        @labrcntip(troponini)@    BNP   Date Value Ref Range Status   10/29/2023 80 0 - 99 pg/mL Final     Comment:     Values of less than 100 pg/ml are consistent with non-CHF populations.   }   Lab Results   Component Value Date    CHOL 218 (H) 10/25/2023    CHOL 242 (H) 10/05/2023    CHOL 146 03/01/2023     Lab Results   Component Value Date    HDL 74 10/25/2023    HDL 81 (H) 10/05/2023    HDL 60 03/01/2023     Lab Results   Component Value Date    LDLCALC 129.0 10/25/2023    LDLCALC 142.4 10/05/2023    LDLCALC 73.4 03/01/2023     Lab Results   Component Value Date    TRIG 75 10/25/2023    TRIG 93 10/05/2023    TRIG 63 03/01/2023     Lab Results   Component Value Date    CHOLHDL 33.9 10/25/2023    CHOLHDL 33.5 10/05/2023    CHOLHDL 41.1 03/01/2023      Last Echo: 9/2022  Last stress test: 9/2021, Lexiscan  Cardiovascular angiogram: none  ECG: atrial paced, rate 60, LAFB, pulmonary pattern with low anterior forces, QTc 454 msec  Fundoscopic exam: biannually, no retinopathy, being treated for glaucoma.    In 6/2014:  Hospitalized 6/3/2014 for acute right sided weakness and slurred speech  LEONARDO Colindres Ayla Dela Cruz is a 69 y.o. female admitted to the services of hospital medicine via the ER for acute CVA. Presented with difficulty speaking, facial drooping and weakness of the right upper and lower extremities. She missed the time window for tPA. ST/PT/OT as well as cardiology and neurology were consulted. Dr. Jurado of cardiology managed A fib. Patient rapidly improved functioning with PT/OT/ST. Currently her goals with PT are met as she is ambulating over 400 feet independently.  She was not approved for IP rehab and refuses SNF. She will be discharged home with outpatient PT/ST/OT evaluation and treatment.    Neurocardio work up  CT head - Mild  involutional changes and findings suggesting mild microvascular ischemic change with no acute intracranial findings    Carotid US - No hemodynamically significant stenosis is noted by velocity criteria involving either internal carotid artery.  A hemodynamically significant stenosis is defined as a stenosis of greater than 50% of the internal carotid artery vessel lumen    ECHO, 6/4/2014, CONCLUSIONS     1 - Concentric hypertrophy. Wall thickness is mildly increased, with the septum and the posterior wall each measuring 1.1 cm across.    2 - Normal left ventricular systolic function (EF 60-65%).     3 - Mild mitral regurgitation.     4 - Left ventricular diastolic dysfunction.     5 - Pulmonary hypertension. estimated PA systolic pressure is 64 mmHg.    6 - Normal right ventricular systolic function .     7 - Suggestion for increase in LV mass from echo on 3/18/2014..     Echo bubble study also negative. Had episode of PAF during monitoring and convert with restart of Flecainide.   Since DC, improving strength in the right hand, right leg feels normal but tire easier. Speech improving. To receive PT/OT/speech today.  Medications reviewed - will DC ASA for now, and know the effects of the Limbitrol and Ativan, uses prn rarely. Some loss of appetite and taste.    Active problems in hospital  Acute left hemispheric CVA, MRI suggest multiple areas, was not on OAC nor antiplatelet Rx  PAF with high CHADS-VAS score, post ablations and DCCs  HTN with LVH  Interstitial lung disease  Pulmonary hypertension  Hyperlipidemia was not on statin    Since visit of 6/20, problem with peripheral swelling, now taking Lasix daily. Last hospitalization / CMP, 6/3 showed K+ 3.4 with normal renal function. Also noted AVILA along with exertional chest heaviness, on-and-off over past several years. Noted it with walking and have to rest. Can last up to an hour. Max grade 10/10, yesterday during the day.  in hospital. Also quite  anxious, stopped Limbitrol due to side effects, managed by Dr. Olivo. Quite sedentary and major decrease in appetite, due to depression. No Psychiatrist. Home BP high 175/97. ECG shows NSR, rate 61, right axis, nonspecific T waves abnormalities, prolong QTc 483 msec. Low anterior forces.    Holter, 6/30/2014:  PREDOMINANT RHYTHM  1. Sinus rhythm with heart rates varying between 43 and 67 bpm with an average of 55 bpm.     VENTRICULAR ARRHYTHMIAS  1. There were frequent polymorphic PVCs totalling 1388 and averaging 40 per hour.  There was 1 couplet.    2. There were no episodes of ventricular tachycardia.    SUPRA VENTRICULAR ARRHYTHMIAS  1. There were very rare PACs totalling 47 and averaging 1 per hour.     2. There were no episodes of sustained supraventricular tachycardia.    SINUS NODE FUNCTION  1. There was no evidence of high grade SA khai block.     AV CONDUCTION  1. There was no evidence of high grade AV block.     DIARY  1. The diary was returned, but not completed    MISCELLANEOUS  1. This was a tape of adequate length (34 hrs).     Since visit of 7/24, better, reviewed by Dr. Olivo and started antidepressant Lexapro. BMP still showed mild hypokalemia of 3.3, not using Lasix at this time. Still feeling fatigue, anxiety, and request refill for Nexium. Discussed need for GI review on chronic PPI. Lack of appetite.  Lexiscan, 7/30/2014, - Nuclear Quantitative Functional Analysis:   LVEF: 66 % (normal is 55 - 69)  LVED Volume: 50 ml (normal is 60 - 98)  LVES Volume: 17 ml (normal is 20 - 42)    Impression: NORMAL MYOCARDIAL PERFUSION  1. The perfusion scan is free of evidence for myocardial ischemia or injury.   2. There is a mild intensity fixed defect in the anteroseptal wall of the left ventricle, secondary to breast attenuation.   3. Resting wall motion is physiologic.   4. Resting LV function is normal.  (normal is 55 - 69)  5. The ventricular volumes are normal at rest and stress.   6. The  "extracardiac distribution of radioactivity is normal.     No problem with spironolactone, BP improved, home BP similar to office readings.    Since visit of 8/14, had some distress and home monitor showed HR of 40, felt "bad" and nervous to ED, note reviewed, ECG and final pulse count 57-58. Labs reviewed - normal renal function and K+ 3.8. Still taking one tab of K+ daily. Not using Lasix. Explained HR discrepancy may be due to VPCs. Reviewed by Ms. Fermin and Lexapro increased to 20 mg, 2 days ago. Feeling "a lot better". Sleeping better. Still sedentary but ready to be more active. Eating better have lost additional 5 lbs since 8/2014.    Since visit of 9/5, still with speech therapy, noted progressive near syncope with SOB and irregular heart beats. Also noted some ankle swelling over the past 2 weeks. ECG shows marked bradycardia, rate 48, right axis, pulmonary pattern, 1st degree AVB. Wellbutrin 100 mg daily along with Lexapro 20 mg by Ms. Fermin NP. BMP showed K+3.5. To use Lasix PRN    Since visit of 9/25, still with marked AVILA, only able to walk about 30' before having to stop. Bothered by increase palpitations during tapering of BB. No definite lung problem, evaluated this year. ECG shows sinus dewayne, rate 53, VPC, RBBB with suggestion of septal MI. No evidence for anemia - CBC 9/3/2014 was normal. Just started doing some activities with arm and leg exercise for the last 2 weeks, Doing some OT alternating with PT every other day.  CXR, 6/3/2014 - Lungs are clear of infiltrate and costophrenic sulci are sharp.  The cardiomediastinal silhouette appears stable.    PET scan, 4/2014 - 1.A hypermetabolic left pulmonary mass is noted with an SUV of 3.0 maximally.  A neoplastic, infectious, or granulomatous process is on the differential. Clinical correlation is suggested.  Close follow-up for resolution or biopsy would be suggested    2.  Elevated right-sided heart pressures are suspected with a large right " "atrium    3.  Right-sided pleural effusion    4.  Hypermetabolic thyroid gland asymmetrically on the right.  This may relate to thyroiditis, Graves' disease, or neoplastic process.  Ultrasound may be helpful.    5.  Small hypermetabolic focus in the expected location of the left ovary, a female pelvis ultrasound may be helpful for further evaluation.  This could relate to a uterine fibroid that has necrosed or infarcted    Biopsy of lung nodule on 5/1/2014 - negative for CA    CTA, 4/2014 - No evidence of pulmonary thromboembolism.    2.  Enlarged cardiac silhouette and secondary findings of elevated right heart pressures with diffuse mosaic lung pattern and right basilar pleural fluid suggesting cardiac decompensation/heart failure.    3. Unchanged 1 cm nodular density within the left upper lobe which previously demonstrated hypermetabolism by PET/CT and unchanged mediastinal lymphadenopathy.    4.  Subpleural nodular density within the right upper lobe is unchanged when compared with the previous study and could represent an area of remote fibrotic scarring.    5.  Heterogeneous appearance of the spleen, possibly due to the phase of contrast enhancement.  Evolving splenic infarction is not entirely excluded.    6. Suggestion of colonic wall thickening involving the descending colon.  Please correlate for symptoms of colitis.    Since visit of 10/14, rehospitalized for HF on 10/26 to 10/29/2014.  DCS - "Ayla Dela Cruz is a 69 y.o. female admitted to the services of hospital medicine via the ER for acute diastolic heart failure. C/o severe SOB and increase in leg swelling over the past two days. Under the care of Dr. Jurado. Recently was taken off metoprolol. History of paroxsymal atrial fib. Hospitalized here in June in this year for acute CVA. It was at that time, pt started Xarelto. Deficits has resolved. No history of heart failure.     Hospital Course: The patient was admitted with acute CHF biventricular and " "mild elevation of her troponin's at 0.029 - 0.035 and therefore an anginal equivalent was suspected. The patient also had significant hypertension upon admission and although she was not in atrial fibrillation, her EKG showed a significant first degree AV block and RBBB. Discussion was made as to whether or not to decrease the patient flecainide; however due to the risk of her going back into atrial fibrillation this was not done. Upon discharge the patient's lisinopril was increased to 10 mg and aldactone was added."    RHC done HEMODYNAMIC RESULTS:    LVEDP (Pre): 24 mmHg  BP: 166/104    Air rest:  PA: 90/34 (54)  PW:  (24)  RVEDP: 16  RA:  (16)    C. SUMMARY:    1. Diastolic dysfunction.  2. - Kee CO 2.64 L/min  3. - Mean systemic pressure 108.6 mmHG, stage II HTN  4. - SVR 41.16 Wood Units  5. - PVR 11.36 Wood Units  6. - Pulmonary HTN due to left sided heart disease and stage II HTN    D. RECOMMENDATIONS:    1. Routine post-cath care.  2. Cardiac rehab referral.  3. Follow up with Dr. Barrett Jurado.  4. - Aggressive BP lowering and diuresis    Repeat ECHO 11/5/2014 CONCLUSIONS     1 - Concentric remodeling. Septal wall thickness is increased, and posterior wall thickness is mildly increased, with the septum measuring 1.3 cm and the posterior wall measuring 1.1 cm across.    2 - Mildly to moderately depressed left ventricular systolic function (EF 40-45%).     3 - Left ventricular diastolic dysfunction. E/e'(lat) is 16    4 - Right ventricular enlargement with mildly depressed systolic function.     5 - Pulmonary hypertension. estimated PA systolic pressure is 56 mmHg.     6 - Intermediate central venous pressure.     7 - No evidence of intracardiac shunt.     8 - No significant change from Echo of 6/4/2014.    Active problems:  Progressive severe SOB, functional class IV  Diastolic dysfunction, elevated BNP and Troponin  Hypokalemia due to diuretics  Secondary Pulmonary HTN with peripheral edema  HTN  History of " "CVA 6/3/2014  PAF controlled with Flecanide  Marked bradycardia with BB  On OAC  Hematuria    At home receiving PT, OT and speech Rx twice a week, with  nurse once a week, no problem with medications. Feeling better, sleeping well. Home /73.    Since visit of 11/14, feeling "much better", concern of occasional HTN, home BP /66-99, HR . Lot more active, no problem. Not using walker, no CP, SOB, nor dizziness. Recent BMP - normal renal function and K+ 4.1.    Since visit of 12/19/2014, worry about HTN home readings similar to office up to . Morning reading is higher. No HA nor visual abnormalities. Xanax has helped but afraid to use too much. ECG shows sinus arrhythmia, rate of 65, late transition and LAFB. Also bothered with dry cough with the Lisinopril. And started to have return of arm pains that was attributed to atorvastatin, on Crestor. Discussed options.     Since visit of 1/16/2015, noted frequent nocturia, 8-10 times, taking losartan-HCT at night time. Have stopped using Lasix and spironolactone for past 2 months. More active without much problem. Labs normal renal function, K+ 4.2, BNP now 37, A1C 5.6%.    Since visit of 2/18/2015, improving, no further dizziness, some SOB when "stressed", usually helped with alprazolam, use only prn, about 3-6 times weekly. Compliant with medications. Been off Crestor for 6 weeks with concern of muscle problem. Home BP is fine, similar to office.     Since visit of 4/15, appetite returned, feeling a lot better. Active, walking twice a week for about half an hour. Also do calisthenic about twice a week, no problem. Seeing Dr. Zavaleta for generalized pruritis for past 3 months. Cardiac wise less AVILA. Sleeping well. Labs in 5/2015, normal CBC without eosinophilia, thyroid normal, ferritin level low normal at 21. Off Crestor for couple months due to fear of muscle pain but did not find much difference being off medication. Last Lipid in 11/2014 was " "good, on daily dose of Crestor. Home /79.    Since visit of 7/2/2015, feeling "great", very active without problem, no more AVILA nor dizziness with standing. Compliant with medications, don't miss. Using Tylenol 500 mg BID for arthritis. Notice easy bruising on Xarelto. Home /10.  Holter - PREDOMINANT RHYTHM  1. Sinus arrhythmia with heart rates varying between 46 and 110 bpm with an average of 73 bpm.     VENTRICULAR ARRHYTHMIAS  1. There were very rare PVCs recorded totalling 8 and averaging less than 1 per hour.     2. There were no episodes of ventricular tachycardia.    SUPRA VENTRICULAR ARRHYTHMIAS  1. There were very frequent PACs totalling 3054 and averaging 132 per hour.  There were 29 bigeminal cycles.  There were 103 couplets.    2. 3% of complexes.    3. There were no episodes of sustained supraventricular tachycardia.    SINUS NODE FUNCTION  1. There was no evidence of high grade SA khai block.     AV CONDUCTION  1. There was no evidence of high grade AV block.     2. The longest RR interval was 1565 msec.     DIARY  1. The diary was properly completed.     2. There was 1 episode of skipping beats reported. The corresponding rhythm strips revealed the following:             During event 1 the rhythm was sinus rhythm at 75 bpm.     3. There was 1 episode of skipped beat reported. The corresponding rhythm strips revealed the following:             During event 1 the rhythm was sinus arrhythmia at 63 bpm.     MISCELLANEOUS  1. This was a tape of adequate length (23 hrs).     Since visit of 10/15, place on new antidepressant, Venlafaxine 75 mg daily, tried 2 and developed rapid HR to 125 bpm, feels more anxious, now switched to Citalopram. Also noted the daily dose of Lorazepam does not seem adequate. Orthostatic VS is positive with lying 142/73, , standing 120/62, . Been taking spironolactone 25 mg for last 3 days. Does feel thirsty. Labs reviewed A1C 5.8%, CBC, BMP were WNL. Lipid " "shows LDL 99, non-. Goal preferred is <70 and < 100. No problem with 10 mg of Crestor. Had vacation at [place] and [place], walked all over without problem.    Since visit of 1/18/2016, no new problem. 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 Labs with , non-, hsCRP at 2.56.     In 10/2016, had acute GB attack with rupture in 8/2016, then tried on Wellbutrin took only 1-2 tabs and BP up to 201/100 with head tightness. Subsequently back to normal, the last 2.5 days took Losartan bid. Discussed Rx options for HTN. Advised to be more active, regular exercise. Lab reviewed LDL in 8/2016 93.     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 Still with daily palpitations, last up to half hours. Have electronic watch, David Barroso, since 9/2016, suppose harmonize patient with the universe. Feeling better if on during the day. Lot of walking at school, no problem.   Holter in 10/2016 - PREDOMINANT RHYTHM  1. Sinus rhythm with heart rates varying between 52 and 144 bpm with an average of 75 bpm.     2. Paroxysmal atrial fibrillation    VENTRICULAR ARRHYTHMIAS  1. There were occasional mostly monomorphic PVCs totalling 596 and averaging 13 per hour.     2. There were no episodes of ventricular tachycardia.    SUPRA VENTRICULAR ARRHYTHMIAS  1. There were frequent PACs totalling 2982 and averaging 69 per hour.  There were 69 bigeminal cycles.  There were 55 couplets.    2. There were no episodes of sustained supraventricular tachycardia.    3. Paroxysmal atrial fibrillation was noted in the the study.     SINUS NODE FUNCTION  1. There was no evidence of high grade SA khai block.     AV CONDUCTION  1. There was no evidence of high grade AV khai filtering.     2. The longest RR interval was 1725 msec.     DIARY  1. The diary was returned, but not completed    MISCELLANEOUS  1. This was a tape of adequate length (43 hrs).     ECHO CONCLUSIONS     1 - Hyperdynamic left ventricular " "systolic function (EF 65-70%).     2 - Normal left ventricular diastolic function.     3 - Normal right ventricular systolic function .     4 - Pulmonary hypertension. The estimated PA systolic pressure is 64 mmHg.     5 - Less evidence for LVH from Echo in 11/2014.     In 5/2017, bothered by recurrent PAF with AVILA after 10 feet walking. Felt better before on Flecainide 100 mg bid, but Holter continued to show PAF. Attempt up titration, problem with itching, switched to propafenone 150 mg tid, continued with itching and reviewed by allergist, treated with 10 days of cortisone with benefit. No hive and no itching, just don't feel good due to the irregular rhythm. History reviewed, had 2 prior AF ablation, last in [place], [place] in around 2000, recall being told not to do again. Recall seeing an [facility] EP doc but didn't like him. Discussed various options. Will stop antiarrhythmic for now, will be going on a 16 days trip to [place]. Reviewed prior medications, have taken Tenormin, metoprolol tartrate, and short-acting diltiazem in the past without problem. Refer to Dr. Calderon for review. ECG in AF, rate 99, PRWP, LAFB    In 11/2017, post AF ablation, felt good for a few weeks, noted some SOB and had review with Dr. Calderon on 11/1 - 72 year old female with a longstanding history of atrial arrhythmias. Her [id number] Score is 5 (age, female, TIA, HTN) so she should remain on anticoagulation indefinitely. She has failed Flecainide. She is now 6 weeks post-PVI and MA flutter line, and maintaining sinus rhythm on low-dose Amiodarone. Given her intermittent AVILA which started post-ablation, I have stopped Amiodarone which I think is the likely culprit. I instructed her to continue taking Lasix PRN, as well as metoprolol and Xarelto." ECG on 11/1 was in NSR, rate 61, PRWP.  Recommended to stop amiodarone but then started to feel the palpitation daily, felt nervous and at the same time being taper down on the nightly Ativan. " "Did have some breakthrough anxiety attacks. Also had strained the upper back and right arm / shoulder, requesting some Tramadol, tried Jan's Tramadol with good relief. Understand this is an opoid. Used Excedrin with caffeine and bothered by more palpitations.    In 3/2018, here for recurrent palpitations, no inciting factors over the past 6 weeks. Had review with Dr. Calderon in 2/2018 - "72 year old female with a longstanding history of atrial arrhythmias and prior ablations at outside institutions. Her [id number] Score is 5 (age, female, TIA, HTN) so she should remain on anticoagulation indefinitely. She is now 5 months post-PVI and MA flutter line, and maintaining sinus rhythm off Amiodarone. The plan is to continue Xarelto, and to see me again in 6 months." Palpitations appears associated with AVILA, had difficulties walking in house or up a ramp. Started 100 mg of amiodarone last week, daily, feels some benefit. ECG today in Sinus rhythm vs wandering atrial pacemaker with frequent PJC, rate 59. Also taking Losartan in the morning appears more effective.  Holter 11/2017 - PREDOMINANT RHYTHM  1. Sinus arrhythmia with heart rates varying between 39 and 102 bpm with an average of 58 bpm.     VENTRICULAR ARRHYTHMIAS  1. There were occasional PVCs totalling 728 and averaging 15 per hour.  There were 5 bigeminal cycles.     2. There were no episodes of ventricular tachycardia.    SUPRA VENTRICULAR ARRHYTHMIAS  1. There was no supraventricular ectopic activity.    SINUS NODE FUNCTION  1. There was no evidence of high grade SA khai block.     AV CONDUCTION  1. There was no evidence of high grade AV block.     2. The longest RR interval was 1820 msec.     DIARY  1. The diary was not returned    MISCELLANEOUS  1. There were occasional hookup related artifacts. Overall, the study was of good quality.     2. This was a tape of adequate length (48 hrs).     in 5/2018, no new problem, still concern of AVILA, usually with " "walking, variable as little 10 feet or fine with some long walk, can play flute for an hour but find difficulties in holding a note. Off daily amiodarone, uses it prn for palpitation, find helpful. Labs reviewed from 1/2018, TSH, Vitamin D, LDL 68.2, A1C 5.9%, CMP - normal renal function, K+ 3.6. To go to [place] for a month in 8/2018.    In 9/2018, return from a month family reunion trip to [place]. Problem with hypotension with Tramadol and Losartan. Had review with Dr. Calderon on 9/5 "Ayla Dela Cruz is a 73 year old female with a longstanding history of atrial arrhythmias and prior ablations at outside institutions. Her [id number] Score is 5 (age, female, TIA, HTN) so she should remain on anticoagulation indefinitely. She is now 11 months post-PVI and MA flutter line, and maintaining sinus rhythm off Amiodarone. However, she is feeling poorly, with frequent PACs and borderline hypotension. The plan is to stop Losartan, echo in next several weeks, Holter monitor in 6 weeks, and follow-up with me in 8 weeks."  Off both medication on 8/31, no problem now. No heart worry. Denies any CP nor SOB. Still teaching. ECG on 9/5 shows NSR with sinus arrhythmia.    In 3/2019, return for follow up, had syncopal spell with hypotension at [facility] required reconstruction of the right ankle, now in PT, doing well. No heart complication, no AF. LDL 90.6 in 1/2019. 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 No heart worries, no CP nor SOB, much better, breathing improved with daily Breo use. Discussed option with NOAC.    In 9/2019, 6 months review, concern of bruising with Xarelto, recall problem with dark stool with Eliquis. Discussed option.     In 1/2020 return due to allergic reaction to Pradaxa, had to stop Eliquis for similar problems - hives and rash, difficult to sleep. Also problem with embolism when off NOAC for 7 days. Discuss alternatives.    In 3/2020, same problem with Savaysa, noted about an " "hour of PAF this morning, felt nervous. Troubled by recent viral infection and also pain injection with steroid, which helped the rash temporary. Would like to proceed with mechanical alternative. History include past use of Coumadin for about 2 years, had some difficult with GIB and also required up to 3 times a week testing.    In 8/2020, here for 6-months review. Troubled more with JOSE with quarantine. Working with therapist. Keeping busy with working on a new book, music also. No heart worries.    In 8/2021, return for annual review. Problem with requiring medication changes for major depression with anorexia and malnutrition. Weight loss of 20 lbs and now improving. No cardiac issue except for slow heart rate down to 52 bpm, asymptomatic.    Fco Calderon MD noted "75 year old female with a longstanding history of atrial arrhythmias and prior ablations at outside institutions. Her [id number] Score is 5 (age, female, TIA, HTN) so she should remain on anticoagulation indefinitely. She is now >3 years post-PVI and MA flutter line, and is maintaining sinus rhythm off Amiodarone.      Ms. Dela Cruz had a likely cardioembolic event after stopping AC for a week. Ms. Dela Cruz wants to be on an anticoagulant she can crush. I have switched her back to eliquis 5 mg bid. She thinks her pruritis was from [employer] laundry detergent and not eliquis.     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 I have told her to contact Dr. Pedraza if she decides she can no longer take anticoagulants so she can discuss details of procedure with him further."    In 1/2022, return for follow up. Post PPM in 10/2021, one episode of near syncope with head injury, no clear etiology, no problem over the past 2 months. Noted some " morning dizziness with  to 110 and HR in the 80s.. Not as active as before and also noted some cognitive dysfunction with loss of ID. No cardiac complaints. Medication reviewed, on low-dose BB for PAF rate control.    Echo 2/2021 - Mild concentric hypertrophy and normal systolic function. The estimated ejection fraction is 60%  There is no evidence of intracardiac shunting.  Small circumferential pericardial effusion.  Mild aortic regurgitation.  Moderate tricuspid regurgitation.  Mild to moderate pulmonic regurgitation.  There is moderate tricuspid valve stenosis.  Moderate right atrial enlargement.  Normal right ventricular size.  Mild mitral regurgitation.  Mild left atrial enlargement.  Intermediate central venous pressure (8 mmHg).  The estimated PA systolic pressure is 53 mmHg.  There is pulmonary hypertension.  Normal left ventricular diastolic function.    Lexiscan 9/2021 - Impression:     1.  Scintigraphically negative for ischemia.  2. Fixed defect along the septal wall is suspicious for a small infarct.  3. The global left ventricular systolic function is normal with an LV ejection fraction of 80 % and no evidence of LV dilatation.  4. Wall motion is normal.    In 7/2022, return post eye operation, unable to be active due to vision, now able to see. Very weak and frail due to anorexia and sedentary status. Noted some heart racing for past 2 days. Back to NSR today. Under nutritional review.    In 12/2022, return for 6-months follow-up. Noted some AMS with standing and lower BP, now weaning off amitriptyline. Also working with eating disorder and is eating better. Working with new Psychiatrist. Discussed pulmonary hypertension, too frail for medications.      Echo 9/2022 - The left ventricle is normal in size with moderate concentric hypertrophy and normal systolic function.  The estimated ejection fraction is 60%.  Indeterminate left ventricular diastolic function.  Normal right ventricular size  "with normal right ventricular systolic function.  Mild left atrial enlargement.  Moderate right atrial enlargement.  Mild aortic regurgitation.  Mild mitral regurgitation.  Moderate tricuspid regurgitation.  Mild pulmonic regurgitation.  Normal central venous pressure (3 mmHg).  The estimated PA systolic pressure is 60 mmHg.  There is pulmonary hypertension.    PPM check 11/2022 - RA pacing 32%, RV pacing 7.9%   Atrial arrhythmias: AMS [id number]--[id number] at 2m 40s w/egms c/w afib/CVR   AT/AF burden: <1%  Battery status/longevity (estimated): 8.2-10 years      In 6/2023, doing as well as she can, kept weight stable with Boost Max. No heart worries.    Fco Calderon MD noted 3/2023 "76 year old female with a longstanding history of atrial arrhythmias and prior ablations at outside institutions. Her [id number] Score is 5 (age, female, TIA, HTN) so she should remain on anticoagulation indefinitely. She is now 4 years post-PVI and MA flutter line, and was maintaining sinus rhythm off Amiodarone until 9/2021. She is now status-post dual chamber pacemaker implantation. AMS burden <1%     Plan is to hold the course and see me again in 1 year. Should she have increased AF burden the plan will be to start sotalol 40 mg bid.     Ms. Dela Cruz had a likely cardioembolic event after stopping AC for a week. Given her history of CVA of unclear etiology but possibly cardioembolic, I think the best course is for her to continue anticoagulation indefinitely, and only consider a Watchman device should she ever develop an absolute contraindication to oral anticoagulation."    PPM check 5/2023 - Presenting egram demonstrates Ap/Vs w/ PAC's   Device function WNL   Autocapture algorithms are RA, RV (OFF).   RA pace 77%, RV pacing 2%   Atrial arrhythmias: AMS x 16, MAX 23 mins 16 sec, Overall burden <1% Overall VRs controlled   Anticoagulation status: Eliquis   Ventricular arrhythmias: None   Battery status/longevity: 7.7-9 years   Return to " "clinic in May 2024     UA LE arterials 3/2023 - No elevated peak systolic velocities suggesting hemodynamically significant narrowing.  Normal GODFREY on the right.  GODFREY on the left is borderline normal.     HPI comments: in 12/202, return for 3-months review. Concern of labile HR from 69 to 107 accompanied by anxiety and panic. Helped with Ativan. Galo Lipscomb, PhD, MP suggest use of propranolol for her HR.     PPM check 8/2023 - Presenting egram demonstrates ASVS   Device function WNL   RA pace 49%, RV pacing 1.5%   Autocapture algorithms are Off   Atrial arrhythmias:  AF burden <1%, max duration 37 minutes.   Anticoagulation status:  Eliquis   Ventricular arrhythmias:  NONE   Battery status/longevity:  7.6-9.2 yrs    Yesenia Duron, NP, EP noted 10/5/2023, virtual visit.   78 y.o. female with HTN, HLD, TIA, AFL (RFA 1990), AF (PVI 1997, PVI 2017), renal infarction, PPM with a telemedicine visit for follow up.   Pt experiencing irreg HB, palps, SOB ~ 2 weeks ago which have improved. No recent alerts from device and remote monitor is functional. Last report shows low (<1%) AF burden. 49% AP, <1% .  Discussed her symptoms could be brief AF vs PMT vs RR vs ectopy. If they recur would recommend 48hr Holter to evaluate symptom-rhythm correlation. She has a sleep study scheduled [facility].     RTC as previously scheduled in March 2024 in [place]."    ROS     Constitutional: negative for chills, have chronic fatigue, no fevers, sweats, no further slurred speech, and no dysarthria, have improved appetite with JOSE, intolerance to heat, bruises easily on NOAC and steroid, weight gain 6 lbs  from 6/2023, diet  Eyes: negative for icterus, redness and visual disturbance, have visual halo,   Respiratory: negative for asthma, cough have resolved off ACE-I, no dyspnea on exertion, SOB with HR 85 to 100 bpm, have lifelong snoring, [place] score 6 improved down to 4 on CPAP, 53% to 70%, no hemoptysis, pleurisy/chest pain " and wheezing  Cardiovascular: negative for chest pain, chest pressure/discomfort, no further orthostatic dizziness, and still some palpitations but have irregular heart beats, no paroxysmal nocturnal dyspnea and syncope  Gastrointestinal: negative for abdominal pain, nausea and vomiting, no further GERD and dysphagia (psychologic), have hemorrhoids  Musculoskeletal:positive for arthralgias, and less right sided muscle weakness with improvement in leg strength, improved bilateral ankle pains - OA and no myalgias  Neurological: negative for coordination problems, gait problems, headaches, paresthesia, have some tremors but able to draw, loss of voice at times, and no vertigo  Psych: positive for depression, nervousness, anxiety, less stressed due to 's have improved  Skin - no further pruritic rash, have dryness    Answers submitted by the patient for this visit:  Review of Systems Questionnaire (Submitted on 12/13/2023)  activity change: No  unexpected weight change: No  neck pain: No  hearing loss: No  rhinorrhea: Yes  trouble swallowing: Yes  eye discharge: Yes  visual disturbance: No  chest tightness: No  wheezing: No  chest pain: No  palpitations: Yes  blood in stool: No  constipation: No  vomiting: No  diarrhea: No  polydipsia: No  polyuria: No  difficulty urinating: No  hematuria: No  menstrual problem: No  dysuria: No  joint swelling: No  arthralgias: No  headaches: No  weakness: Yes  confusion: No  dysphoric mood: No    Objective:    Physical Exam - orthostatic VS: sitting 120/60, standing 123/60    General appearance: alert, appears stated age, cooperative and no distress, improved skin turgor, RA O2 sat. 99%  Head: Normocephalic, without obvious abnormality, atraumatic  Eyes:  conjunctivae/corneas clear. PERRL, EOM's intact.    Neck: no adenopathy, no carotid bruit, no JVD, supple, symmetrical, trachea midline and thyroid not enlarged, symmetric, no tenderness/mass/nodules  Lungs:  clear to  "auscultation bilaterally  Chest wall: no tenderness  Heart: regular rate and rhythm, normal S1, S2 normal, click, rub or gallop    Abdomen: soft, non-tender; bowel sounds normal; no masses,  no organomegaly, waist 31" down to 27" hip 42"  Extremities: extremities no further weakness of the right upper extremity, atraumatic, no cyanosis and have no edema, minor varicose veins  Pulses: 2+ and symmetric    Assessment:       1. Paroxysmal atrial fibrillation, ablations X 3 1995, 1997, 9/2017, CHADS-VAS score 5, HAS-BLED score 5     2. Cardiac pacemaker in situ, [employer], dual chamber, 10/14/2021    3. Chronic diastolic heart failure, 2014    4. Frail elderly    5. Cognitive impairment    6. Long term (current) use of anticoagulants    7. Moderate protein-calorie malnutrition    8. At risk for cardiovascular event    9. Panic attacks, onset 6/2023    10. NILSA on CPAP, using 53% to 70%    11. Chronic fatigue         Plan:       Ayla was seen today for follow-up.    Diagnoses and associated orders for this visit:    Paroxysmal atrial fibrillation, ablations X 3 1995, 1997, 9/2017, CHADS-VAS score 5, HAS-BLED score 5   -     IN OFFICE EKG 12-LEAD (to [place])  -     Ambulatory referral/consult to Cardiology    Cardiac pacemaker in situ, [employer], dual chamber, 10/14/2021  -     IN OFFICE EKG 12-LEAD (to [place])  -     Ambulatory referral/consult to Cardiology    Chronic diastolic heart failure, 2014    Frail elderly    Cognitive impairment    Long term (current) use of anticoagulants    Moderate protein-calorie malnutrition    At risk for cardiovascular event    Panic attacks, onset 6/2023    NILSA on CPAP, using 53% to 70%    Chronic fatigue    - All medical issues reviewed, will switch metoprolol to propranolol for more brain effects.  - Warning signs of MI and stroke given, if symptoms last more than 5 minutes, stop immediately and call 911, then chew 2-4 low-dose ASA (81 mg).  - Consider use of Potassium " chloride salt substitute, [place] Nu-Salt.   - Need to remain well hydrated but avoid drinking within 4 hours of bedtime. Recommend at least 90 oz of fluid daily per IOM (institute of Medicine) suggestion.  - CV status and off antiarrhythmic, all medications reviewed, patient acknowledge good understanding.  - Recommend healthy living: healthy diet and regular exercise aiming for fitness, restorative sleep and weight control  - Recommend using Tylenol as first line pain medications.  - Instruction for Mediterranean, high potassium diet and heart healthy exercise given.  - Need good exercise program, 4 key elements: 1. Aerobic (walking, swimming, dancing, jogging, biking, etc, 2. Muscle strengthening / resistance exercise, need to do 2-3 times weekly, 3. Stretching daily, good stretch takes a whole  total minute. 4. Balance exercise daily.  - Encourage activities as much as tolerated. Any activity is better than none!   - Highly recommend 30-60 minutes of exercise daily, can have Sunday off, with 2-3 sessions of muscle strengthening weekly. A  would be very helpful.  - Recommend at least biannual cardiovascular evaluation in view of her significant risk factors, patient 's preference.  - Need to go visit [place], 2 weeks to 3 months.    Patient Active Problem List   Diagnosis    Paroxysmal atrial fibrillation, ablations X 3 1995, 1997, 9/2017, CHADS-VAS score 5, HAS-BLED score 5     Hypertension, 1985    Hyperlipidemia, baseline     GERD (gastroesophageal reflux disease)    Glaucoma (increased eye pressure)    Fibroid uterus    Thickened endometrium    Abnormal CT scan, chest    Interstitial lung disease    Pulmonary hypertension, without RV dysfunction    H/O: CVA (cerebrovascular accident), June 2014    LVH (left ventricular hypertrophy) due to hypertensive disease    Sedentary lifestyle, 6/2014    AVILA (dyspnea on exertion)    OA (osteoarthritis)    Chronic diastolic heart failure, 2014     Elevated brain natriuretic peptide (BNP) level, range 98 - 1045    Microalbuminuria    Hypoalbuminemia due to protein-calorie malnutrition    TIA (transient ischemic attack), 11/3/2014    S/P ablation of atrial flutter    JOSE (generalized anxiety disorder)    Other spondylosis, lumbar region    Cervical spondylosis    Medication intolerance    Anticoagulant long-term use    GERD (gastroesophageal reflux disease)    Mild intermittent asthma without complication    Elevated troponin    BMI less than 19,adult    Localized hives    Elevated LFTs    Iron deficiency anemia due to chronic blood loss    Recurrent major depressive disorder    Gastroenteritis    Other spondylosis, cervical region    History of DVT (deep vein thrombosis)    Chronic sinus complaints    Second degree AV block, Mobitz type I    Anemia    Syncope    Protein calorie malnutrition    At risk for cardiovascular event    Atrial fibrillation    Long term (current) use of anticoagulants    Atrial arrhythmia    Cardiac pacemaker in situ, [employer], dual chamber, 10/14/2021    Cognitive impairment    Frail elderly    Dehydration, moderate    Hyphema, right eye    Vitreous hemorrhage, right eye    Primary open angle glaucoma (POAG) of both eyes, indeterminate stage    Pseudophakia of both eyes    Chronic fatigue    Anorexia nervosa    Benzodiazepine dependence, Ativan 0.5 mg daily since 2021    Panic attacks, onset 6/2023    NILSA on CPAP, using 53% to 70%     Total time spend including review of record prior to face-to-face visit is 40 minutes. Greater than 50% of the time was spent in counseling and coordination of care. The above assessment and plan have been discussed at length. Referring provider's note reviewed. Labs and procedure over the last 6 months reviewed. Problem List updated. Asked to bring in all active medications / pills bottles with next visit. Will send note to referring / PCP.

## 2023-12-20 NOTE — TELEPHONE ENCOUNTER
Attempted to call patient to schedule thyroid US order, but no answer and unable to leave a voicemail.

## 2023-12-21 ENCOUNTER — PATIENT MESSAGE (OUTPATIENT)
Dept: PULMONOLOGY | Facility: CLINIC | Age: 78
End: 2023-12-21
Payer: MEDICARE

## 2023-12-22 ENCOUNTER — CLINICAL SUPPORT (OUTPATIENT)
Dept: REHABILITATION | Facility: HOSPITAL | Age: 78
End: 2023-12-22
Payer: MEDICARE

## 2023-12-22 DIAGNOSIS — G45.9 TIA (TRANSIENT ISCHEMIC ATTACK): ICD-10-CM

## 2023-12-22 DIAGNOSIS — R13.10 DYSPHAGIA, UNSPECIFIED TYPE: Primary | ICD-10-CM

## 2023-12-22 PROCEDURE — 92610 EVALUATE SWALLOWING FUNCTION: CPT | Mod: PN

## 2023-12-22 NOTE — PROGRESS NOTES
[facility]  Speech Therapy Evaluation -Dysphagia    Date: 12/22/2023     Name: Ayla Dela Cruz   MRN: [id number]    Therapy Diagnosis: No diagnosis found. Physician: Xavier Trejo *  Physician Orders: Ambulatory Referral to Speech Therapy   Medical Diagnosis: TIA (transient ischemic attack) [G45.9]     Visit # / Visits Authorized:  1 / 1   Date of Evaluation:  12/22/2023   Insurance Authorization Period: 12/19/2023 - 12/29/2023   Plan of Care Certification:    12/22/2023 to 2/16/2024      Time In:3:25   Time Out: 4:25   Total time: 60 mins    Procedure   Swallow and Oral Function Evaluation         Precautions: Standard, Dysphagia, and Status post TIA  Subjective   Date of Onset: 3 years ago; no exact date  History of Current Condition:  Ayla Dela Cruz is a 78 y.o. female who presents to [facility] Outpatient Speech Therapy for evaluation secondary to TIA (transient ischemic attack). Patient was referred to therapy by Xavier Trejo* , which is the patient's otolarngologist. Patient reports difficulty swallowing solid and easy to chew food consistencies and difficulty swallowing pills, with no difficulty swallowing water or other thin liquids. Patient endorses increased anxiety when having to swallow pills or solid, dry, or hard foods. Endorses difficulty and prolonged chewing solid foods. Patient notes since previous TIA she consistently bites her left inner cheek when eating. Patient notes self-modified her current diet to puree foods and thin liquids. Endorses weight loss since date of onset, notes drinking ensure and high performance beverages to supplement calorie loss. Patient is currently taking pills crushed in puree foods (e.g., yogurt or smoothies) Endorses past medical history of hiatal hernia, currently on Protonix to manage GERD. Patient notes use of hydrated CPAP machine use at night (e.g., 2-4 hours). Endorses waking up in the middle of  "the night coughing or gasping for air. Patient consistent xerostomia with adequate consumption of water. Patient was accompanied to the evaluation by Jan, her .     History of current condition per physician report:  "...presents to office with main complaints of issues swallowing and dry mouth. Pt presented to ED 10/26/2023 due to left facial numbness and was placed inpatient. She was advised she had a TIA. During the course of her stay, she was seen by speech therapy-[facility] for cognitive linguistic impairment. Moderate intensity therapy was recommended. She had bedside swallow study done that was normal 10/26/2023. Pt states that she feels like her throat her swollen or closing up when she tries to swallow, but she is able to swallow. Pt states that her issues stem from a fear of swallowing. Pt states she was given an anti-anxiety medication and she was able to swallow. MRI Brain WO 11/09/2023 notes mild cerebral and cerebellar atrophy. Mild chronic small vessel ischemic changes. CTA head and neck 10/25/2023 shows beaded appearance of the right proximal ICA. Bilateral thyroid nodules noted. Recent TSH 10/25/2023 WNL. Pt has NILSA and is on CPAP. Pt is using her humidity setting with distilled water. Pt has dry throat and dry eyes. Pt states she was not staying well-hydrated. She is not staying well hydrated. Pt denies h/o autoimmune disorders. Pt had tonsillectomy when she was around 4 years of age. Biotene lozenges and mouth rinse do not help. Pt has developed fear of choking from watching her grandmother choke when she was in her early 40s and she had to do the heimlich maneuver to save her grandmother."     Past Medical History: Ayla Dela Cruz  has a past medical history of Anticoagulant long-term use, Anxiety, Arthritis, Atrial fibrillation, Basal cell carcinoma, Cancer, CHF (congestive heart failure), Depression, DVT (deep venous thrombosis), GERD (gastroesophageal " reflux disease), Glaucoma (increased eye pressure), Hyperlipidemia, Hypertension, Interstitial lung disease, Localized hives (01/10/2020), Mild persistent asthma without complication (11/12/2018), Pacemaker, Pneumonia (01/31/2014), Stroke (06/03/2014), Stroke, TIA (transient ischemic attack), and TIA (transient ischemic attack).  Ayla Dela Cruz  has a past surgical history that includes 2 heart ablations; bilateral cataracts; pyloristenosis; Tonsillectomy; skin cancer removal ; Cholecystectomy; Colonoscopy (N/A, 1/19/2017); Injection of anesthetic agent around medial branch nerves innervating cervical facet joint (Right, 6/7/2018); Radiofrequency thermal coagulation of medial branch of posterior ramus of cervical spinal nerve (Right, 7/3/2018); Radiofrequency ablation of lumbar medial branch nerve at single level (Bilateral, 9/21/2018); Open reduction and internal fixation (ORIF) of injury of ankle (Right, 11/1/2018); Radiofrequency ablation of lumbar medial branch nerve at single level (Bilateral, 2/19/2019); Radiofrequency thermal coagulation of medial branch of posterior ramus of cervical spinal nerve (Right, 7/23/2019); Radiofrequency thermocoagulation (Bilateral, 9/10/2019); Colonoscopy (N/A, 10/10/2019); Esophagogastroduodenoscopy (N/A, 10/14/2019); Radiofrequency ablation of lumbar medial branch nerve at single level (Bilateral, 3/10/2020); Radiofrequency thermal coagulation of medial branch of posterior ramus of cervical spinal nerve (Right, 6/23/2020); Radiofrequency ablation of lumbar medial branch nerve at single level (Bilateral, 9/11/2020); Radiofrequency ablation of lumbar medial branch nerve at single level (Bilateral, 5/28/2021); A-V cardiac pacemaker insertion (Left, 10/14/2021); paracentesis, eye, anterior chamber, with aqueous removal (Right, 7/27/2022); and Anterior vitrectomy (Right, 7/27/2022).  Medical Hx and Allergies: Ayla has a current medication list which includes the following  prescription(s): apixaban, aspirin, atropine 1%, buspirone, dorzolamide-timolol 2-0.5%, latanoprost, lorazepam, pantoprazole, propranolol, triamcinolone acetonide 0.1%, trintellix, and [DISCONTINUED] brimonidine 0.2%, and the following Facility-Administered Medications: bupivacaine (pf) 0.25% (2.5 mg/ml), lidocaine (pf) 10 mg/ml (1%), lidocaine (pf) 20 mg/ml (2%), and methylprednisolone acetate.   Review of patient's allergies indicates:   Allergen Reactions    Gabapentin Hallucinations    Xarelto [rivaroxaban] Rash    Bactrim [sulfamethoxazole-trimethoprim] Other (See Comments)     Upset stomach, dry heaves, confusion    Afrin (pseudoephedrine)     Amoxicillin-pot clavulanate      Other reaction(s): Mental Status Change    Atorvastatin      Other reaction(s): Joint pain    Baclofen     Baclofen (bulk) Nausea And Vomiting    Ciprofloxacin     Decongest tabs      Other reaction(s): increased heart rate    Decongestant [pseudoephedrine hcl]     Erythromycin Other (See Comments)    Flecainide Hives     And SOB. No reaction to Lidocaine     Fluoxetine      Other reaction(s): heart palpitations  Other reaction(s): anxiety    Lisinopril Other (See Comments)     cough    Losartan Other (See Comments)     Hypotension with lightheadedness    Morphine Other (See Comments)     confusion    Tramadol Other (See Comments)     SOB, low BP    Venlafaxine     Venlafaxine analogues      Changes in BP and increases heart rate       Afrin [oxymetazoline] Palpitations    Caffeine Palpitations    Dabigatran etexilate Rash    Plavix [clopidogrel] Rash    Tizanidine Anxiety     dizziness     Prior Therapy:  Inpatient and outpatient cognitive-linguistic therapy 2014  Social History:  Patient lives with  in [place], Patient is not currently driving- has not driven in 3 years  Occupation: Retired teacher; 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   Prior Level of Function: Eating all food consistencies with no fear of  "swallowing   Current Level of Function: Self-modified diet of thin liquids and pureed food consistencies; supplementing calories with Ensure and Boost High performance  Pain Scale: no pain indicated throughout session  Patient's Therapy Goals:  Help reduce anxiety of swallowing and husbands "eat more and put weight on"  Objective   Formal Assessment:  Clinical Swallow Exam/ Cranial Nerve Exam:  Cranial Nerve Examination  Cranial Nerve 5: Trigeminal Nerve  Motor Jaw Posture at rest: Open  Mandible Elevation/Depression: WFL  Mandible lateralization: WFL  Abnormal movement: absent Interpretation: WFL   Sensory Forehead: WFL  Cheek: WFL  Jaw: WFL  Facial Pain: None noted Interpretation: WFL     Cranial Nerve 7: Facial Nerve  Motor Facial Symmetry: WNL and mask-like expression  Wrinkle Forehead: WFL  Close eyes tightly: WFL  Labial Protrusion: WFL  Labial Retraction: WFL  Buccal Strength with Labial Seal: Reduced  Abnormal movement: absent Interpretation: Cannot rule out possible CN involvement   Sensory Formal testing not completed. Patient denied any changes in taste      Cranial Nerves IX and X: Glossopharyngeal and Vagus Nerves  Motor Palatal Symmetry (Rest): WNL  Palatal Symmetry (Movement): WNL; decreased movement during consecutive "ah" production  Cough: Perceptually strong  Voice Prior to PO intake:  aphonic  Resonance: Normal  Abnormal movement: present Interpretation: Cannot rule out possible cranial nerve involvement     Cranial Nerve XII: Hypoglossal Nerve  Motor Tongue at rest: fasciculations; deviation to right  Lingual Protrusion: Unable to protrude past lips without resting on lower teeth or lip  Lingual Protrusion against Resistance: Weakness  Lingual Lateralization:  discoordinated movement laterally; unable to move tip up or down  Abnormal movement: present Interpretation: Cannot rule out possible cranial nerve involvement     Other information:  Mucosal Quality: Xerostomia  Secretion Management: " adequate  Dentition: Good condition for speech and mastication; multiple fillers noted      In consideration of the interrelationships between body systems and swallowing, History was provided by patient, , and/or taken from chart review. The following are medical conditions present in the patient's history which can result in or be attributed to dysphagia:  Respiratory Asthma  Interstitial lung disease   Cardiovascular Hypertension treated with ACE inhibitors   Hyperlipidemia  Syncope  Pulmonary hypertension  DOA   Digestive GERD  Gastroenteritis  Hiatal hernia   Infections  None noted in this category   Urinary None noted in this category   Endocrine None noted in this category   Nervous Transient Ischemic Attack (TIA)  cerebrovascular accident (CVA)    Skeletal cervical spondylosis   Immune None noted in this category   Cancer None noted in this category   Psychiatric  Anxiety  Recurrent major depressive disorder  Anorexia nervosa  Panic attacks   Congenital  None noted in this category   Other Chronic fatigue       Laryngeal Function Examination:  -Secretions: Xerostomia noted  -Vocal Quality: aphonic  -MPT: 16  -S/Z Ratio: 1.3  -Cough: Weak    Predisposing dysphagia risk factors: Previous history of cardiovascular accidents  Clinical signs of possible chronic dysphagia: Weight loss, self-modified diet  Precipitating dysphagia risk factors: Complex medical history    EAT-10 Score:    Eating Assessment Tool (EAT-10) is a questionnaire given to the patient to  her swallowing function.     Key: 0= no problem; 4= severe problem  1. My swallowing problem has caused me to lose weight. - 4  2. My swallowing problem interferes with my ability to go out for meals. - 4  3. Swallowing liquids takes extra effort. - 0  4. Swallowing solids takes extra effort. - 4  5. Swallowing pills takes extra effort. - 4  6. Swallowing is painful. - 1  7. The pleasure of eating is affected by my swallowing. - 4  8. When I  swallow food sticks in my throat. - 4  9. I cough when I eat. - 0  10. Swallowing is stressful. - 4    Ayla ranked her swallowing function as a 29/40     Interpretation: Score of greater than 3 is indicative of a swallowing disorder (Pravin et al., 2008); higher scores correlate with increased penetration-aspiration  scale scores, and scores greater than 15 results in the patient being 2.2 times more likely to aspirate (Destinee et al., 2015)    References:   DOMENICO Costello., VALERIE Moore., EDWARD Moise., NAHUM Javier., JONY Lopez., NAHUM Carlos, & HALLEY Leon (2008). Validity and reliability of the Eating Assessment Tool (EAT-10). Annals of Otology, Rhinology & Laryngology, 117(12), 919-924. https://doi.org/10.1177/[email or web address]  VALERIE Felipe., CHRIS Alonzo, NAHUM Kay., CHRIS Lambert., & DOMENICO Costello. (2015). The ability of the 10-item eating assessment tool (EAT-10) to predict aspiration risk in persons with dysphagia. Annals of Otology, Rhinology & Laryngology, 124(5), 351-354. https://doi.org/10.1177/[email or web address]    PILL-5: (Carson et al., 2019)    Key: 0= never; 1= almost never; 2=sometimes; 3= almost always; 4=always  1. Pills stick in my throat- 3  2. Pills stick in my chest- 0  3. I have a fear of swallowing pills- 4  4. My problem swallowing pills interferes with my ability to take my medicine- 4  5. I cant take my pills without crushing, coating, or using other forms of assistance- 4  Total score: 15/20    Interpretation*: less than 6 results in minimal or no pill dysphagia; greater than or equal to 6 but less than 11 results in mild to moderate, may manage with pill lubricants*; and greater than or equal to 11 results in moderate to severe pill dysphagia, may require an altered formulation of medication    *Referral to a swallowing specialist is warranted in individuals with a PILL-5 is greater than or  equal to 6 to rule out obstructing pathology such an esophageal or  cricopharyngeal stricture or web that may be easily treatable.    Reference: RASHEL Byrd., ANCELMO Campos., HARLEY Rebolledo., CHRIS Ellis, ASHVIN Ocampo, Fausto, MEmy A., & Pravin, DOMENICO SUAREZ. (2019). Validation of the PILL-5: a 5-item patient reported outcome measure for pill dysphagia. Frontiers in surgery, 6, 43.https://doi.org/10.3389/fsurg.2019.32971    Reflux Severity Index:  The Reflux Severity Index (RSI) was completed to assess the possible presence and/or severity of laryngopharyngeal reflux symptoms and any relationship between this condition and the patient's dysphagia. A score of greater than or equal to 15 may indicate laryngopharyngeal reflux. Score 0-5 (0=no problem, 1=very mild, 2=moderate or slight, 3=moderate, 4=severe, & 5=problem as bad as it can be)  1. Hoarseness or a problem with your voice. 1  2. Clearing your throat. 1  3. Excess throat or mucous post-nasal drip. 3  4. Difficulty swallowing food, liquids or pills. 5  5. Coughing after you ate or after lying down. 0  6. Breathing difficulties or choking episodes. 2  7. Troublesome or annoying cough. 0  8. Sensations of something sticking in your throat. 4  9. Heartburn, chest pain, indigestion, or stomach acid coming up. 0    Patient completed with a result of  16/45    [place] Swallow Protocol:  The [place] Swallow Protocol was administered. The patient was alert and provided the instructions prior to the beginning of the protocol. The patient consumed 3 oz before putting the cup down. Patient drank with consecutive swallows. Patient with overt cough and throat clear. Patient  failed the screener.    [place] Swallow Protocol dictates patient remain NPO if fail screener; (Suiter et al. 2014) however, as objective swallow assessment is not available for greater than a week, patient will remain on current diet until objective assessment is completed unless otherwise indicated.     PO Trials:   Patient presented with:   THIN:-3 oz water test and self regulated thin  liquid via cup x3  PUREE:- tsp bites of applesauce x2  DENTAL SOFT:- tsp bites of peaches x1-refused 2nd trial  SOLID: -bite of piper doone cookie x0-refused    *Thicken liquids were not used in this assessment. Jeramy (2018) reported that thickened liquids have no sound evidence as reducing risk of pneumonia in patients with dysphagia and can cause harm by increasing risk of dehydration. It also presents an increased risk of UTI, electrolyte imbalance, constipation, fecal impaction, cognitive impairment, functional decline, and even death ([place]ah, 2002; Zina, 2016).  This supports the assertion that we should confirm a patient requires thickened liquids with an instrumental swallow study prior to recommending them.    Interpretation: The patient had no anterior loss of the bolus with adequate closure of the lips around the spoon. Trace residue remained in the oral cavity following the swallow of tsp bite of apple sauce. Patient cleared with self-regulated cup sip of water. Patient with prolonged mastication on 1 peach with liquid trial. Patient noted to produce multiple swallows to clear dental soft bolus. Patient reported globus sensation on left side of throat following swallow of 1 peach with liquid. Patient produced effortful swallow with self-regulated cup sip of water x 2, noting globus sensation still present. Patient refused second dental soft trial and solid food trial due to increased anxiety. Patient without overt clinical signs/symptoms of aspiration on any PO trials given. Patient presents with a possibly inefficient swallow as indicated by prolonged mastication, trace oral residue, and reported globus sensation after PO trial administration. Contributing risk factors for dysphagia include  complex medical history and patient's self-reported swallow anxiety . Patient with increased risk for silent aspiration given potential sensory deficits related to stroke.    oropharyngeal dysphagia suspected  based on clinical bedside evaluation, likely Chronic related to complex medical history, patient report of persisting dysphagia symptomology, and noted weight loss.  An instrumental swallow study via Fiberoptic Endoscopic Evaluation of the Swallow (FEES)  recommended to visualize oropharyngeal swallow function, determine least restrictive diet and appropriate treatment plan. Given prolonged wait time for outpatient instrumental studies, patient should continue current diet prior to instrumental study.    Reference:   American Speech-Language-Hearing Association. (n.d.b). Adult dysphagia. (Practice Portal). https://www.kristina.org/practice-portal/clinical-topics/adult-dysphagia/  EVANGELINA Deshpande (2018). Use of modified diets to prevent aspiration in oropharyngeal dysphagia: Is current practice justified?. BMC Geriatrics, 18(1), 1-10.  EVANGELINA Claire., MICHELLE Wilks, DOMENICO Temple, & CORAZON Espinosa. (2002). Predictors of aspiration pneumonia in nursing home residents.  Dysphagia, 17(4), 298-307.  MICHELLE Izaguirre (2016). Best practices for dehydration prevention. Perspectives of the [place] Special Interest Groups - SIG 13, 1(2), 72- 80.    Functional Oral Intake Scale (FOIS)  The Functional Oral Intake Scale (FOIS) is an ordinal scale that is used to assess the current status and meaningful change in the oral intake. FOIS levels include:    TUBE DEPENDENT (levels 1-3) 1. No oral intake  2. Tube dependent with minimal/inconsistent oral intake  3. Tube supplements with consistent oral intake      TOTAL ORAL INTAKE (levels 4-7) 4. Total oral intake of a single consistency  5. Total oral intake of multiple consistencies requiring special preparation  6. Total oral intake with no special preparation, but must avoid specific foods or liquid items  7. Total oral intake with no restrictions     Patient is currently judged to be at FOIS level 5.         Treatment   Total Treatment Time Separate from Evaluation: not applicable   No treatment  performed secondary to time to complete evaluation.     Education provided:   -role of Speech Therapy, goals/plan of care, scheduling/cancellations, insurance limitations with patient  -Additional Education provided:   Aspiration precautions  Results of swallow study  Swallow physiology  Compensatory swallow strategies  Fiberoptic Endoscopic Evaluation of Swallow    Patient and family members expressed understanding.     Home Program: Not yet established  Assessment     Ayla presents to [facility] status post medical diagnosis of TIA (transient ischemic attack).     Interpretation of objective assessment:   She presents with suspected oropharyngeal dysphagia characterized by prolonged mastication, lingual and buccal oral residue, overt signs/symptoms of aspiration and globus sensation after PO intake. Contributing risk factors for dysphagia include complex medical history and patient's self-reported swallow anxiety. Presenting dysphagia is likely Chronic related to patient's report of persistent dysphagia symptomology and noted weight loss. Patient with increased risk for silent aspiration given potential sensory deficits related to stroke. An instrumental swallow study via Fiberoptic Endoscopic Evaluation of the Swallow (FEES)  recommended to visualize oropharyngeal swallow function, determine least restrictive diet and appropriate treatment plan. Lingual fasciculations, decreased range of motion, and strength observed during cranial nerve exam may be indicative of an underlying neurological component. Recommendation for referral to Neurologist for further evaluation warranted to further rule out neurological etiology. Demonstrates impairments including limitations as described in the problem list.     Positive prognostic factors: Family support, self-awareness to deficits, and motivation  Negative prognostic factors: Complex medical history  Barriers to therapy: No barriers to therapy  identified.     Patient's spiritual, cultural, and educational needs considered and patient agreeable to plan of care and goals.    Patient will benefit from skilled therapy.    Rehab Potential: good      Short Term Goals: (4 weeks) Current Progress:   Patient will successfully participate in Fiberoptic Endoscopic Evaluation of Swallow  to visualize oropharyngeal swallow function, rule out aspiration, and determine least restrictive diet.  Progressing/ Not Met 12/22/2023   Established this date   2.  Patient will independently utilize the swallow strategy of oral preparatory set to consume liquids and solids 60-75 times per session.    Progressing/ Not Met 12/22/2023   Established this date   3. Patient will decrease laryngeal and thyrohyoid space tension utilizing circumlaryngeal massage 2-5x/day and/or prior to oral intake with digital pressure.      Progressing/ Not Met 12/22/2023   Established this date    4.  Patient will independently demonstrate adequate use of effortful swallow technique to improve overall swallow strength, safety and efficiency 60-75x per session.      Progressing/ Not Met 12/22/2023   Established this date    5.  Patient will demonstrate the ability to adequately self-monitor swallowing skills and perform appropriate compensatory techniques to reduce s/s of aspiration.     Progressing/ Not Met 12/22/2023   Established this date    6.  Patient will participate in dysphagia education including clinical signs of aspiration with returned demonstration of information.     Progressing/ Not Met 12/22/2023   Established this date        Long Term Goals: (8 weeks) Current Progress:   Patient will maintain adequate hydration/nutrition with optimum safety and efficiency of swallowing function on least restrictive PO diet level without overt signs and symptoms of aspiration.    Established this date      2. Patient will utilize compensatory strategies with optimum safety and efficiency of swallowing  function on least restrictive PO diet level without over signs and symptoms of aspiration.    Established this date         Plan     Recommended Treatment Plan:  Patient will participate in the [facility] program for speech therapy 2 times per week for 8 weeks to address her Swallow deficits, to educate patient and their family, and to participate in a home exercise program.    Other Recommendations:   Fiberoptic Endoscopic Evaluation of the Swallow (FEES)  Referral to Neurology    Therapist's Name:   Danny De La Torre M.S., CF-SLP  Speech-Language Pathologist Resident  12/22/2023

## 2023-12-22 NOTE — TELEPHONE ENCOUNTER
Contacted patients spouse, appt scheduled for thyroid US on 12/26/23 at 230PM at [facility]

## 2023-12-26 ENCOUNTER — HOSPITAL ENCOUNTER (OUTPATIENT)
Dept: RADIOLOGY | Facility: HOSPITAL | Age: 78
Discharge: HOME | End: 2023-12-26
Attending: PHYSICIAN ASSISTANT
Payer: MEDICARE

## 2023-12-26 DIAGNOSIS — E04.2 MULTIPLE THYROID NODULES: ICD-10-CM

## 2023-12-26 PROCEDURE — 76536 US EXAM OF HEAD AND NECK: CPT | Mod: TC

## 2023-12-26 PROCEDURE — 76536 US THYROID: ICD-10-PCS | Mod: 26,,, | Performed by: RADIOLOGY

## 2023-12-26 PROCEDURE — 76536 US EXAM OF HEAD AND NECK: CPT | Mod: 26,,, | Performed by: RADIOLOGY

## 2023-12-26 NOTE — PLAN OF CARE
[facility]  Speech Therapy Evaluation -Dysphagia    Date: 12/22/2023     Name: Ayla Dela Cruz   MRN: [id number]    Therapy Diagnosis:   Encounter Diagnoses   Name Primary?    Dysphagia, unspecified type Yes    TIA (transient ischemic attack)      Physician: Xavier Trejo,*  Physician Orders: Ambulatory Referral to Speech Therapy   Medical Diagnosis: TIA (transient ischemic attack) [G45.9]     Visit # / Visits Authorized:  1 / 1   Date of Evaluation:  12/22/2023   Insurance Authorization Period: 12/19/2023 - 12/29/2023   Plan of Care Certification:    12/22/2023 to 2/16/2024      Time In:3:25   Time Out: 4:25   Total time: 60 mins    Procedure   Swallow and Oral Function Evaluation         Precautions: Standard, Dysphagia, and Status post TIA  Subjective   Date of Onset: 3 years ago; no exact date  History of Current Condition:  Ayla Dela Cruz is a 78 y.o. female who presents to [facility] Outpatient Speech Therapy for evaluation secondary to TIA (transient ischemic attack). Patient was referred to therapy by Xavier Trejo,* , which is the patient's otolarngologist. Patient reports difficulty swallowing solid and easy to chew food consistencies and difficulty swallowing pills, with no difficulty swallowing water or other thin liquids. Patient endorses increased anxiety when having to swallow pills or solid, dry, or hard foods. Endorses difficulty and prolonged chewing solid foods. Patient notes since previous TIA she consistently bites her left inner cheek when eating. Patient notes self-modified her current diet to puree foods and thin liquids. Endorses weight loss since date of onset, notes drinking ensure and high performance beverages to supplement calorie loss. Patient is currently taking pills crushed in puree foods (e.g., yogurt or smoothies) Endorses past medical history of hiatal hernia, currently on Protonix to manage GERD. Patient notes use  "of hydrated CPAP machine use at night (e.g., 2-4 hours). Endorses waking up in the middle of the night coughing or gasping for air. Patient consistent xerostomia with adequate consumption of water. Patient was accompanied to the evaluation by Jan, her .     History of current condition per physician report:  "...presents to office with main complaints of issues swallowing and dry mouth. Pt presented to ED 10/26/2023 due to left facial numbness and was placed inpatient. She was advised she had a TIA. During the course of her stay, she was seen by speech therapy-[facility] for cognitive linguistic impairment. Moderate intensity therapy was recommended. She had bedside swallow study done that was normal 10/26/2023. Pt states that she feels like her throat her swollen or closing up when she tries to swallow, but she is able to swallow. Pt states that her issues stem from a fear of swallowing. Pt states she was given an anti-anxiety medication and she was able to swallow. MRI Brain WO 2023 notes mild cerebral and cerebellar atrophy. Mild chronic small vessel ischemic changes. CTA head and neck 10/25/2023 shows beaded appearance of the right proximal ICA. Bilateral thyroid nodules noted. Recent TSH 10/25/2023 WNL. Pt has NILSA and is on CPAP. Pt is using her humidity setting with distilled water. Pt has dry throat and dry eyes. Pt states she was not staying well-hydrated. She is not staying well hydrated. Pt denies h/o autoimmune disorders. Pt had tonsillectomy when she was around 4 years of age. Biotene lozenges and mouth rinse do not help. Pt has developed fear of choking from watching her grandmother choke when she was in her early 40s and she had to do the heimlich maneuver to save her grandmother."     Imagin23: MRI brain without IV contrast  "FINDINGS:Diffusion-weighted imaging is normal.Mild cerebral and cerebellar atrophy is evident. Periventricular and subcortical white " "matter T2 hyperintensity suggest gliosis from chronic small vessel ischemic changes. No intracranial mass effect or midline shift. Ventricles and cisterns are maintained. Major intracranial flow voids are unremarkable"    10/25/2023: CTA HEAD AND NECK (XPD)  "Impression:   1. Beaded appearance of the right proximal and mid ICA reason the possibility from fibromuscular dysplasia.  Atherosclerosis is in the differential although plaque is not clearly evident.  2. No aneurysm or hemodynamically significant stenosis involving the head and neck arterial vasculature.  3. Mild generalized cerebral atrophy and white matter disease.  4. Bilateral thyroid nodules.  Consider ultrasound in an elective setting for further characterization."    Past Medical History: Ayla Dela Cruz  has a past medical history of Anticoagulant long-term use, Anxiety, Arthritis, Atrial fibrillation, Basal cell carcinoma, Cancer, CHF (congestive heart failure), Depression, DVT (deep venous thrombosis), GERD (gastroesophageal reflux disease), Glaucoma (increased eye pressure), Hyperlipidemia, Hypertension, Interstitial lung disease, Localized hives (01/10/2020), Mild persistent asthma without complication (11/12/2018), Pacemaker, Pneumonia (01/31/2014), Stroke (06/03/2014), Stroke, TIA (transient ischemic attack), and TIA (transient ischemic attack).  Ayla Dela Cruz  has a past surgical history that includes 2 heart ablations; bilateral cataracts; pyloristenosis; Tonsillectomy; skin cancer removal ; Cholecystectomy; Colonoscopy (N/A, 1/19/2017); Injection of anesthetic agent around medial branch nerves innervating cervical facet joint (Right, 6/7/2018); Radiofrequency thermal coagulation of medial branch of posterior ramus of cervical spinal nerve (Right, 7/3/2018); Radiofrequency ablation of lumbar medial branch nerve at single level (Bilateral, 9/21/2018); Open reduction and internal fixation (ORIF) of injury of ankle (Right, " 11/1/2018); Radiofrequency ablation of lumbar medial branch nerve at single level (Bilateral, 2/19/2019); Radiofrequency thermal coagulation of medial branch of posterior ramus of cervical spinal nerve (Right, 7/23/2019); Radiofrequency thermocoagulation (Bilateral, 9/10/2019); Colonoscopy (N/A, 10/10/2019); Esophagogastroduodenoscopy (N/A, 10/14/2019); Radiofrequency ablation of lumbar medial branch nerve at single level (Bilateral, 3/10/2020); Radiofrequency thermal coagulation of medial branch of posterior ramus of cervical spinal nerve (Right, 6/23/2020); Radiofrequency ablation of lumbar medial branch nerve at single level (Bilateral, 9/11/2020); Radiofrequency ablation of lumbar medial branch nerve at single level (Bilateral, 5/28/2021); A-V cardiac pacemaker insertion (Left, 10/14/2021); paracentesis, eye, anterior chamber, with aqueous removal (Right, 7/27/2022); and Anterior vitrectomy (Right, 7/27/2022).  Medical Hx and Allergies: Ayla has a current medication list which includes the following prescription(s): apixaban, aspirin, atropine 1%, buspirone, dorzolamide-timolol 2-0.5%, latanoprost, lorazepam, pantoprazole, propranolol, triamcinolone acetonide 0.1%, trintellix, and [DISCONTINUED] brimonidine 0.2%, and the following Facility-Administered Medications: bupivacaine (pf) 0.25% (2.5 mg/ml), lidocaine (pf) 10 mg/ml (1%), lidocaine (pf) 20 mg/ml (2%), and methylprednisolone acetate.   Review of patient's allergies indicates:   Allergen Reactions    Gabapentin Hallucinations    Xarelto [rivaroxaban] Rash    Bactrim [sulfamethoxazole-trimethoprim] Other (See Comments)     Upset stomach, dry heaves, confusion    Afrin (pseudoephedrine)     Amoxicillin-pot clavulanate      Other reaction(s): Mental Status Change    Atorvastatin      Other reaction(s): Joint pain    Baclofen     Baclofen (bulk) Nausea And Vomiting    Ciprofloxacin     Decongest tabs      Other reaction(s): increased heart rate     "Decongestant [pseudoephedrine hcl]     Erythromycin Other (See Comments)    Flecainide Hives     And SOB. No reaction to Lidocaine     Fluoxetine      Other reaction(s): heart palpitations  Other reaction(s): anxiety    Lisinopril Other (See Comments)     cough    Losartan Other (See Comments)     Hypotension with lightheadedness    Morphine Other (See Comments)     confusion    Tramadol Other (See Comments)     SOB, low BP    Venlafaxine     Venlafaxine analogues      Changes in BP and increases heart rate       Afrin [oxymetazoline] Palpitations    Caffeine Palpitations    Dabigatran etexilate Rash    Plavix [clopidogrel] Rash    Tizanidine Anxiety     dizziness     Prior Therapy:  Inpatient and outpatient cognitive-linguistic therapy 2014  Social History:  Patient lives with  in [place], Patient is not currently driving- has not driven in 3 years  Occupation: Retired teacher; 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   Prior Level of Function: Eating all food consistencies with no fear of swallowing   Current Level of Function: Self-modified diet of thin liquids and pureed food consistencies; supplementing calories with Ensure and Boost High performance  Pain Scale: no pain indicated throughout session  Patient's Therapy Goals:  Help reduce anxiety of swallowing and husbands "eat more and put weight on"  Objective   Formal Assessment:  Clinical Swallow Exam/ Cranial Nerve Exam:  Cranial Nerve Examination  Cranial Nerve 5: Trigeminal Nerve  Motor Jaw Posture at rest: Open  Mandible Elevation/Depression: WFL  Mandible lateralization: WFL  Abnormal movement: absent Interpretation: WFL   Sensory Forehead: WFL  Cheek: WFL  Jaw: WFL  Facial Pain: None noted Interpretation: WFL     Cranial Nerve 7: Facial Nerve  Motor Facial Symmetry: WNL and mask-like expression  Wrinkle Forehead: WFL  Close eyes tightly: WFL  Labial Protrusion: WFL  Labial Retraction: WFL  Buccal Strength with Labial Seal: " "Reduced  Abnormal movement: absent Interpretation: Cannot rule out possible CN involvement   Sensory Formal testing not completed. Patient denied any changes in taste      Cranial Nerves IX and X: Glossopharyngeal and Vagus Nerves  Motor Palatal Symmetry (Rest): WNL  Palatal Symmetry (Movement): WNL; decreased movement during consecutive "ah" production  Cough: Perceptually strong  Voice Prior to PO intake: aphonic  Resonance: Normal  Abnormal movement: present Interpretation: Cannot rule out possible cranial nerve involvement     Cranial Nerve XII: Hypoglossal Nerve  Motor Tongue at rest: fasciculations; deviation to right  Lingual Protrusion: Unable to protrude past lips without resting on lower teeth or lip  Lingual Protrusion against Resistance: Weakness  Lingual Lateralization: discoordinated movement laterally; unable to move tip up or down  Abnormal movement: present Interpretation: Cannot rule out possible cranial nerve involvement     Other information:  Mucosal Quality: Xerostomia  Secretion Management: adequate  Dentition: Good condition for speech and mastication; multiple fillers noted      In consideration of the interrelationships between body systems and swallowing, History was provided by patient, , and/or taken from chart review. The following are medical conditions present in the patient's history which can result in or be attributed to dysphagia:  Respiratory Asthma  Interstitial lung disease   Cardiovascular Hypertension treated with ACE inhibitors   Hyperlipidemia  Syncope  Pulmonary hypertension  DOA   Digestive GERD  Gastroenteritis  Hiatal hernia   Infections  None noted in this category   Urinary None noted in this category   Endocrine None noted in this category   Nervous Transient Ischemic Attack (TIA)  cerebrovascular accident (CVA)    Skeletal cervical spondylosis   Immune None noted in this category   Cancer None noted in this category   Psychiatric  Anxiety  Recurrent major " depressive disorder  Anorexia nervosa  Panic attacks   Congenital  None noted in this category   Other Chronic fatigue       Laryngeal Function Examination:  -Secretions: Xerostomia noted  -Vocal Quality: aphonic  -MPT: 16  -S/Z Ratio: 1.3  -Cough: Weak    Predisposing dysphagia risk factors: Previous history of cardiovascular accidents  Clinical signs of possible chronic dysphagia: Weight loss, self-modified diet  Precipitating dysphagia risk factors: Complex medical history    EAT-10 Score:    Eating Assessment Tool (EAT-10) is a questionnaire given to the patient to  her swallowing function.     Key: 0= no problem; 4= severe problem  1. My swallowing problem has caused me to lose weight. - 4  2. My swallowing problem interferes with my ability to go out for meals. - 4  3. Swallowing liquids takes extra effort. - 0  4. Swallowing solids takes extra effort. - 4  5. Swallowing pills takes extra effort. - 4  6. Swallowing is painful. - 1  7. The pleasure of eating is affected by my swallowing. - 4  8. When I swallow food sticks in my throat. - 4  9. I cough when I eat. - 0  10. Swallowing is stressful. - 4    Ayla ranked her swallowing function as a 29/40     Interpretation: Score of greater than 3 is indicative of a swallowing disorder (Pravin et al., 2008); higher scores correlate with increased penetration-aspiration  scale scores, and scores greater than 15 results in the patient being 2.2 times more likely to aspirate (Destinee et al., 2015)    References:   DOMENICO Costello., VALERIE Moore., EDWARD Moise., NAHUM Javier., John, G. N., NAHUM Carlos, & HALLEY Leon (2008). Validity and reliability of the Eating Assessment Tool (EAT-10). Annals of Otology, Rhinology & Laryngology, 117(12), 919-924. https://doi.[email or web address]/[email or web address]  VALERIE Felipe., CHRIS Alonzo., NAHUM Kay., Fausto, MEmy A., & DOMENICO Costello. (2015). The ability of the 10-item eating assessment tool (EAT-10) to predict aspiration risk in  persons with dysphagia. Annals of Otology, Rhinology & Laryngology, 124(5), 351-354. https://doi.org/10.1177/[email or web address]    PILL-5: (Carson et al., 2019)    Key: 0= never; 1= almost never; 2=sometimes; 3= almost always; 4=always  1. Pills stick in my throat- 3  2. Pills stick in my chest- 0  3. I have a fear of swallowing pills- 4  4. My problem swallowing pills interferes with my ability to take my medicine- 4  5. I cant take my pills without crushing, coating, or using other forms of assistance- 4  Total score: 15/20    Interpretation*: less than 6 results in minimal or no pill dysphagia; greater than or equal to 6 but less than 11 results in mild to moderate, may manage with pill lubricants*; and greater than or equal to 11 results in moderate to severe pill dysphagia, may require an altered formulation of medication    *Referral to a swallowing specialist is warranted in individuals with a PILL-5 is greater than or  equal to 6 to rule out obstructing pathology such an esophageal or cricopharyngeal stricture or web that may be easily treatable.    Reference: RASHEL Byrd., ANCELMO Campos., HARLEY Rebolledo., CHRIS Ellis, MILLY Ocampo., Fausto, M. A., & Pravin, P. C. (2019). Validation of the PILL-5: a 5-item patient reported outcome measure for pill dysphagia. Frontiers in surgery, 6, 43.https://doi.org/10.3389/fsurg.[email or web address]    Reflux Severity Index:  The Reflux Severity Index (RSI) was completed to assess the possible presence and/or severity of laryngopharyngeal reflux symptoms and any relationship between this condition and the patient's dysphagia. A score of greater than or equal to 15 may indicate laryngopharyngeal reflux. Score 0-5 (0=no problem, 1=very mild, 2=moderate or slight, 3=moderate, 4=severe, & 5=problem as bad as it can be)  1. Hoarseness or a problem with your voice. 1  2. Clearing your throat. 1  3. Excess throat or mucous post-nasal drip. 3  4. Difficulty swallowing food, liquids or  pills. 5  5. Coughing after you ate or after lying down. 0  6. Breathing difficulties or choking episodes. 2  7. Troublesome or annoying cough. 0  8. Sensations of something sticking in your throat. 4  9. Heartburn, chest pain, indigestion, or stomach acid coming up. 0    Patient completed with a result of  16/45    Norden Swallow Protocol:  The Norden Swallow Protocol was administered. The patient was alert and provided the instructions prior to the beginning of the protocol. The patient consumed 3 oz before putting the cup down. Patient drank with consecutive swallows. Patient with overt cough and throat clear. Patient  failed the screener.    Norden Swallow Protocol dictates patient remain NPO if fail screener; (Karyr et al. 2014) however, as objective swallow assessment is not available for greater than a week, patient will remain on current diet until objective assessment is completed unless otherwise indicated.     PO Trials:   Patient presented with:   THIN:-3 oz water test and self regulated thin liquid via cup x3  PUREE:- tsp bites of applesauce x2  DENTAL SOFT:- tsp bites of peaches x1-refused 2nd trial  SOLID: -bite of piper doone cookie x0-refused    *Thicken liquids were not used in this assessment. ODoreen (2018) reported that thickened liquids have no sound evidence as reducing risk of pneumonia in patients with dysphagia and can cause harm by increasing risk of dehydration. It also presents an increased risk of UTI, electrolyte imbalance, constipation, fecal impaction, cognitive impairment, functional decline, and even death (D[place]otah, 2002; Zina, 2016).  This supports the assertion that we should confirm a patient requires thickened liquids with an instrumental swallow study prior to recommending them.    Interpretation: The patient had no anterior loss of the bolus with adequate closure of the lips around the spoon. Trace residue remained in the oral cavity following the swallow of tsp bite of apple  sauce. Patient cleared with self-regulated cup sip of water. Patient with prolonged mastication on 1 peach with liquid trial. Patient noted to produce multiple swallows to clear dental soft bolus. Patient reported globus sensation on left side of throat following swallow of 1 peach with liquid. Patient produced effortful swallow with self-regulated cup sip of water x 2, noting globus sensation still present. Patient refused second dental soft trial and solid food trial due to increased anxiety. Patient without overt clinical signs/symptoms of aspiration on any PO trials given. Patient presents with a possibly inefficient swallow as indicated by prolonged mastication, trace oral residue, and reported globus sensation after PO trial administration. Contributing risk factors for dysphagia include complex medical history and patient's self-reported swallow anxiety. Patient with increased risk for silent aspiration given potential sensory deficits related to stroke.    oropharyngeal dysphagia suspected based on clinical bedside evaluation, likely Chronic related to complex medical history, patient report of persisting dysphagia symptomology, and noted weight loss.  An instrumental swallow study via Fiberoptic Endoscopic Evaluation of the Swallow (FEES)  recommended to visualize oropharyngeal swallow function, determine least restrictive diet and appropriate treatment plan. Given prolonged wait time for outpatient instrumental studies, patient should continue current diet prior to instrumental study.    Reference:   American Speech-Language-Hearing Association. (n.d.b). Adult dysphagia. (Practice Portal). https://www.keyla.org/practice-portal/clinical-topics/adult-dysphagia/  Jeramy S. T. (2018). Use of modified diets to prevent aspiration in oropharyngeal dysphagia: Is current practice justified?. BMC Geriatrics, 18(1), 1-10.  EVANGELINA Claire., MICHELLE Wilks, DOMENICO Temple, & ALLYSON Espinosa (2002). Predictors of aspiration  pneumonia in nursing home residents.  Dysphagia, 17(4), 298-307.  MICHELLE Izaguirre (2016). Best practices for dehydration prevention. Perspectives of the KRISTINA Special Interest Groups - SIG 13, 1(2), 72- 80.    Functional Oral Intake Scale (FOIS)  The Functional Oral Intake Scale (FOIS) is an ordinal scale that is used to assess the current status and meaningful change in the oral intake. FOIS levels include:    TUBE DEPENDENT (levels 1-3) 1. No oral intake  2. Tube dependent with minimal/inconsistent oral intake  3. Tube supplements with consistent oral intake      TOTAL ORAL INTAKE (levels 4-7) 4. Total oral intake of a single consistency  5. Total oral intake of multiple consistencies requiring special preparation  6. Total oral intake with no special preparation, but must avoid specific foods or liquid items  7. Total oral intake with no restrictions     Patient is currently judged to be at FOIS level 5.         Treatment   Total Treatment Time Separate from Evaluation: not applicable   No treatment performed secondary to time to complete evaluation.     Education provided:   -role of Speech Therapy, goals/plan of care, scheduling/cancellations, insurance limitations with patient  -Additional Education provided:   Aspiration precautions  Results of swallow study  Swallow physiology  Compensatory swallow strategies  Fiberoptic Endoscopic Evaluation of Swallow    Patient and family members expressed understanding.     Home Program: Not yet established  Assessment     Ayla presents to [facility] status post medical diagnosis of TIA (transient ischemic attack).     Interpretation of objective assessment:   She presents with suspected oropharyngeal dysphagia characterized by prolonged mastication, lingual and buccal oral residue, overt signs/symptoms of aspiration and globus sensation after PO intake. Contributing risk factors for dysphagia include complex medical history and patient's self-reported  swallow anxiety. Presenting dysphagia is likely Chronic related to patient report of persistent dysphagia symptomology and noted weight loss. Patient with increased risk for silent aspiration given potential sensory deficits related to stroke. An instrumental swallow study via Fiberoptic Endoscopic Evaluation of the Swallow (FEES)  recommended to visualize oropharyngeal swallow function, determine least restrictive diet and appropriate treatment plan. Demonstrates impairments including limitations as described in the problem list.     Positive prognostic factors: Family support, self-awareness to deficits, and motivation  Negative prognostic factors: Complex medical history  Barriers to therapy: No barriers to therapy identified.     Patient's spiritual, cultural, and educational needs considered and patient agreeable to plan of care and goals.    Patient will benefit from skilled therapy.    Rehab Potential: good      Short Term Goals: (4 weeks) Current Progress:   Patient will successfully participate in Fiberoptic Endoscopic Evaluation of Swallow  to visualize oropharyngeal swallow function, rule out aspiration, and determine least restrictive diet.  Progressing/ Not Met 12/22/2023   Established this date   2.  Patient will independently utilize the swallow strategy of oral preparatory set to consume liquids and solids 60-75 times per session.    Progressing/ Not Met 12/22/2023   Established this date   3. Patient will decrease laryngeal and thyrohyoid space tension utilizing circumlaryngeal massage 2-5x/day and/or prior to oral intake with digital pressure.      Progressing/ Not Met 12/22/2023   Established this date    4.  Patient will independently demonstrate adequate use of effortful swallow technique to improve overall swallow strength, safety and efficiency 60-75x per session.      Progressing/ Not Met 12/22/2023   Established this date    5.  Patient will demonstrate the ability to adequately self-monitor  swallowing skills and perform appropriate compensatory techniques to reduce s/s of aspiration.     Progressing/ Not Met 12/22/2023   Established this date    6.  Patient will participate in dysphagia education including clinical signs of aspiration with returned demonstration of information.     Progressing/ Not Met 12/22/2023   Established this date        Long Term Goals: (8 weeks) Current Progress:   Patient will maintain adequate hydration/nutrition with optimum safety and efficiency of swallowing function on least restrictive PO diet level without overt signs and symptoms of aspiration.    Established this date      2. Patient will utilize compensatory strategies with optimum safety and efficiency of swallowing function on least restrictive PO diet level without over signs and symptoms of aspiration.    Established this date         Plan     Recommended Treatment Plan:  Patient will participate in the [facility] rehabilitation program for speech therapy 2 times per week for 8 weeks to address her Swallow deficits, to educate patient and their family, and to participate in a home exercise program.    Other Recommendations:   Fiberoptic Endoscopic Evaluation of the Swallow (FEES)    Therapist's Name:   Danny De La Torre M.S., CF-SLP  Speech-Language Pathologist Resident  12/22/2023

## 2023-12-27 ENCOUNTER — PATIENT MESSAGE (OUTPATIENT)
Dept: OTOLARYNGOLOGY | Facility: CLINIC | Age: 78
End: 2023-12-27
Payer: MEDICARE

## 2023-12-27 DIAGNOSIS — E04.2 MULTIPLE THYROID NODULES: Primary | ICD-10-CM

## 2023-12-27 DIAGNOSIS — R13.10 DYSPHAGIA, UNSPECIFIED TYPE: Primary | ICD-10-CM

## 2023-12-27 NOTE — TELEPHONE ENCOUNTER
Contacted patient spouse, scheduled for thyroid US in 6 months for requested date and time. He was also advised to have labs drawn the same time, I was unable to schedule it for 6 months, but left a note on US appt to have labs drawn as well.

## 2023-12-28 NOTE — PROGRESS NOTES
"[facility]  Speech Therapy Treatment Note- Dysphagia  Date: 12/29/2023     Name: Ayla Dela Cruz   MRN: [id number]   Therapy Diagnosis:   Encounter Diagnosis   Name Primary?    Dysphagia, unspecified type Yes   Physician: Xavier Trejo,*  Physician Orders: Ambulatory Referral to Speech Therapy   Medical Diagnosis: TIA (transient ischemic attack) [G45.9]     Visit #/ Visits Authorized: 1/ 6  Date of Evaluation:  12/22/2023  Insurance Authorization Period: 12/29/2023 to 12/31/2023  Plan of Care Expiration Date:    2/16/2023  Extended Plan of Care:  N/A   Progress Note: 1/25/2024     Time In:  1430  Time Out:  1520  Total Billable Time: 50     Precautions: Standard, Dysphagia, and Cardiac  Subjective:   Patient reports: Brought video to show this morning when she experienced  "numbness" on whole left side of face, but did not feel numb to touch.  She was compliant to home exercise program. (Not yet established-first session)  Response to previous treatment: good  Pain Scale: no pain indicated throughout session  Objective:   UNTIMED  Procedure   Dysphagia Therapy        Short Term Goals: (4 weeks) Current Progress:   Patient will successfully participate in Fiberoptic Endoscopic Evaluation of Swallow to visualize oropharyngeal swallow function, rule out aspiration, and determine least restrictive diet.    Progressing/ Not Met 12/29/2023   Scheduled for 1/10/2023 at [facility].   Patient will independently utilize the swallow strategy of oral preparatory set to consume liquids and solids 60-75 times per session.     Progressing/ Not Met 12/29/2023   Participated in demonstration and instruction. Patient completed 10 oral preparatory sets with water swallow given max reduced to mod assist.     No overt s/s of aspiration noted. Patient with weak cough- discussed addressing diaphragmatic breathing in future sessions. *see below   3. Patient will decrease laryngeal and thyrohyoid space " tension utilizing circumlaryngeal massage 2-5x/day and/or prior to oral intake with digital pressure.     Progressing/ Not Met 2023   Introduced, Not formally addressed this session.      4. Patient will independently demonstrate adequate use of effortful swallow technique to improve overall swallow strength, safety and efficiency 60-75x per session.       Progressing/ Not Met 2023   Participated in demonstration and instruction. Patient completed 10 effortful swallow with water given mod assist.     5. Patient will demonstrate the ability to adequately self-monitor swallowing skills and perform appropriate compensatory techniques to reduce s/s of aspiration     Progressing/ Not Met 2023   Introduced, patient self-monitored while completing swallow strategies and exercises.     6. Patient will participate in dysphagia education including clinical signs of aspiration with returned demonstration of information.     Progressing/ Not Met 2023   *See education below       Patient participated in demonstration and instruction of diaphragmatic breathing to improve overall respiratory muscle strength to increase airflow for effective cough production.    Patient Education/Response:   Patient educated regarding the followin. Initial review of plan of care.  2. Normal and disordered swallow function and anatomy education.  3. Swallow breath coordination and importance of respiratory muscle strength for strong productive cough  4. Swallow strategies and exercises to increase safety and efficiency of swallow.  5. Strategies to reduce swallow anxiety when eating and/or drinking    Patient and  both expressed understanding-they were given handouts of all education addressed above.    Home program established: Program modified based on patient progress  Patient verbalized understanding to all above education provided.     See Electronic Medical Record under Patient Instructions for exercises  provided throughout therapy.  Assessment:   Ayla actively participated well in today's session which focused on dysphagia exercises, dysphagia strategies, and education. Today strengths were noted in implementation of learned strategies and exercises. Improvement continues to be noted in swallow breath coordination and improve respiratory strength for productive cough. Difficulties remain in anxiety with swallowing solid, easy to chew, or soft bite sized consistencies. Cognitive, Physical, and Emotional fatigue was not believed to have been a barrier to the session. To date, Ayla has met 0 goals.     Ayla is progressing well towards her goals. Current goals remain appropriate. Goals to be updated as necessary.     Patient prognosis is Good. Patient will continue to benefit from skilled outpatient speech and language therapy to address the deficits listed in the problem list on initial evaluation, provide patient/family education and to maximize patient's level of independence in the home and community environment.   Medical necessity is demonstrated by the following IMPAIRMENTS:  Dysphagia: Decreased safety and efficiency of swallowing function on PO intake without overt s/s of aspiration for the highest diet level.   Barriers to Therapy: No barriers to therapy identified  Patient's spiritual, cultural and educational needs considered and patient agreeable to plan of care and goals.  Plan:   Continue Plan of Care with focus on rehabilitation and compensation for dysphagia.    Danny De La Torre M.S., CF-SLP  Speech-Language Pathologist Resident  12/29/2023

## 2023-12-29 ENCOUNTER — CLINICAL SUPPORT (OUTPATIENT)
Dept: REHABILITATION | Facility: HOSPITAL | Age: 78
End: 2023-12-29
Payer: MEDICARE

## 2023-12-29 DIAGNOSIS — R13.10 DYSPHAGIA, UNSPECIFIED TYPE: Primary | ICD-10-CM

## 2023-12-29 PROCEDURE — 92526 ORAL FUNCTION THERAPY: CPT | Mod: PN

## 2024-01-03 ENCOUNTER — CLINICAL SUPPORT (OUTPATIENT)
Dept: CARDIOLOGY | Facility: HOSPITAL | Age: 79
End: 2024-01-03

## 2024-01-03 ENCOUNTER — CLINICAL SUPPORT (OUTPATIENT)
Dept: CARDIOLOGY | Facility: HOSPITAL | Age: 79
End: 2024-01-03
Attending: INTERNAL MEDICINE
Payer: MEDICARE

## 2024-01-03 DIAGNOSIS — I48.91 UNSPECIFIED ATRIAL FIBRILLATION: ICD-10-CM

## 2024-01-03 DIAGNOSIS — Z95.0 PRESENCE OF CARDIAC PACEMAKER: ICD-10-CM

## 2024-01-03 PROCEDURE — 93294 REM INTERROG EVL PM/LDLS PM: CPT | Mod: ,,, | Performed by: INTERNAL MEDICINE

## 2024-01-03 PROCEDURE — 93296 REM INTERROG EVL PM/IDS: CPT | Performed by: INTERNAL MEDICINE

## 2024-01-05 ENCOUNTER — CLINICAL SUPPORT (OUTPATIENT)
Dept: REHABILITATION | Facility: HOSPITAL | Age: 79
End: 2024-01-05
Payer: MEDICARE

## 2024-01-05 DIAGNOSIS — R13.10 DYSPHAGIA, UNSPECIFIED TYPE: Primary | ICD-10-CM

## 2024-01-05 PROCEDURE — 92526 ORAL FUNCTION THERAPY: CPT | Mod: PN

## 2024-01-05 NOTE — PROGRESS NOTES
[facility]  Speech Therapy Treatment Note- Dysphagia  Date: 1/5/2024     Name: Ayla Dela Cruz   MRN: [id number]   Therapy Diagnosis:   Encounter Diagnosis   Name Primary?    Dysphagia, unspecified type Yes     Physician: Xavier Trejo,*  Physician Orders: Ambulatory Referral to Speech Therapy   Medical Diagnosis: TIA (transient ischemic attack) [G45.9]     Visit #/ Visits Authorized: 1/ 20 (1 previous)  Date of Evaluation:  12/22/2023  Insurance Authorization Period: 12/29/2023 to 12/31/2023  Plan of Care Expiration Date:    2/16/2023  Extended Plan of Care:  N/A   Progress Note: 1/25/2024     Time In:  1300  Time Out:  1345  Total Billable Time: 45     Precautions: Standard, Dysphagia, and Cardiac  Subjective:   Patient reports: Having bad allergies today with excessive post-nasal drip and watery eyes. Recently switched her glaucoma eyedrops, and notes her blood pressure has been lower since then (noted to be a common side effect per her doctors report). Follow-up appointment on 1/11/2024 with Dr. Caterina Mccoy.    She was compliant to home exercise program.- practiced oral preparatory sets, did not work on effortful swallow.  Response to previous treatment: good  Pain Scale: no pain indicated throughout session  Objective:   UNTIMED  Procedure   Dysphagia Therapy        Short Term Goals: (4 weeks) Current Progress:   Patient will successfully participate in Fiberoptic Endoscopic Evaluation of Swallow to visualize oropharyngeal swallow function, rule out aspiration, and determine least restrictive diet.    Progressing/ Not Met 1/5/2024   Scheduled for 1/10/2023 at [facility].    Patient expressed concerns and nervous feelings going forward with FEES. Discussed FEEs procedure step by step, given images of where the scope is transported through nasal cavity, and the normal/aspiration view of the structures clinician will be able to visualize during procedure.   Patient will independently  "utilize the swallow strategy of oral preparatory set to consume liquids and solids 60-75 times per session.     Progressing/ Not Met 2024   Patient completed 61 oral preparatory sets of thin liquid trials given min verbal and visual cues.    Patient produced 2 throat clears and 1 productive volitional cough during PO trials.    Met x1   3. Patient will decrease laryngeal and thyrohyoid space tension utilizing circumlaryngeal massage 2-5x/day and/or prior to oral intake with digital pressure.     Progressing/ Not Met 2024   Introduced, Not formally addressed this session.      4. Patient will independently demonstrate adequate use of effortful swallow technique to improve overall swallow strength, safety and efficiency 60-75x per session.       Progressing/ Not Met 2024    Patient completed 61 effortful swallows during thin liquid PO trials beginning with minimal verbal and visual instruction, transitioning to independent production towards end of session. Patient noted possible fatigue in pharyngeal/laryngeal muscles following 20 effortful swallows.    Patient produced 2 throat clears and 1 productive volitional cough during PO trials.    Met x1   5. Patient will demonstrate the ability to adequately self-monitor swallowing skills and perform appropriate compensatory techniques to reduce s/s of aspiration     Progressing/ Not Met 2024   Patient continued self-monitoring swallow skills while completing swallow strategies and exercises.      -Notes minimal instances of coughing or "choking" when eating or drinking at home since utilizing oral preparatory set technique.   6. Patient will participate in dysphagia education including clinical signs of aspiration with returned demonstration of information.     Progressing/ Not Met 2024   *See education below     Patient Education/Response:   Patient educated regarding the followin. Normal and disordered swallow function and anatomy education.  3. " Fiberoptic endoscopic evaluation of swallowing- education of procedure steps, risks, and purpose.  4. Swallow strategies and exercises to increase safety and efficiency of swallow.  5. Strategies to reduce swallow anxiety when eating and/or drinking    Patient and  both expressed understanding-they were given handouts of all education addressed above.    Home program established: Program modified based on patient progress  Patient verbalized understanding to all above education provided.     See Electronic Medical Record under Patient Instructions for exercises provided throughout therapy.  Assessment:   Ayla actively participated well in today's session which focused on dysphagia exercises, dysphagia strategies, and education. Today strengths were noted in implementation of learned strategies with increased repetition of exercises, meeting 2 session goals. Improvement continues to be noted in swallow breath coordination and improve respiratory strength for productive cough. Difficulties remain in anxiety with swallowing solid, easy to chew, or soft bite sized consistencies. Cognitive, Physical, and Emotional fatigue was not believed to have been a barrier to the session. To date, Ayla has met 0 goals.     Ayla is progressing well towards her goals. Current goals remain appropriate. Goals to be updated as necessary.     Patient prognosis is Good. Patient will continue to benefit from skilled outpatient speech and language therapy to address the deficits listed in the problem list on initial evaluation, provide patient/family education and to maximize patient's level of independence in the home and community environment.   Medical necessity is demonstrated by the following IMPAIRMENTS:  Dysphagia: Decreased safety and efficiency of swallowing function on PO intake without overt s/s of aspiration for the highest diet level.   Barriers to Therapy: No barriers to therapy identified  Patient's spiritual,  cultural and educational needs considered and patient agreeable to plan of care and goals.  Plan:   Continue Plan of Care with focus on rehabilitation and compensation for dysphagia.    Danny De La Torre M.S., CF-SLP  Speech-Language Pathologist Resident  1/5/2024

## 2024-01-06 ENCOUNTER — PATIENT MESSAGE (OUTPATIENT)
Dept: OPHTHALMOLOGY | Facility: CLINIC | Age: 79
End: 2024-01-06
Payer: MEDICARE

## 2024-01-06 ENCOUNTER — PATIENT MESSAGE (OUTPATIENT)
Dept: CARDIOLOGY | Facility: CLINIC | Age: 79
End: 2024-01-06
Payer: MEDICARE

## 2024-01-06 ENCOUNTER — PATIENT MESSAGE (OUTPATIENT)
Dept: FAMILY MEDICINE | Facility: CLINIC | Age: 79
End: 2024-01-06
Payer: MEDICARE

## 2024-01-09 ENCOUNTER — CLINICAL SUPPORT (OUTPATIENT)
Dept: REHABILITATION | Facility: HOSPITAL | Age: 79
End: 2024-01-09
Payer: MEDICARE

## 2024-01-09 ENCOUNTER — CLINICAL SUPPORT (OUTPATIENT)
Dept: PSYCHIATRY | Facility: CLINIC | Age: 79
End: 2024-01-09
Payer: MEDICARE

## 2024-01-09 DIAGNOSIS — F41.9 ANXIETY: ICD-10-CM

## 2024-01-09 DIAGNOSIS — F41.0 PANIC ATTACKS: ICD-10-CM

## 2024-01-09 DIAGNOSIS — R13.10 DYSPHAGIA, UNSPECIFIED TYPE: Primary | ICD-10-CM

## 2024-01-09 DIAGNOSIS — R41.89 COGNITIVE IMPAIRMENT: Primary | ICD-10-CM

## 2024-01-09 PROCEDURE — 99499 UNLISTED E&M SERVICE: CPT | Mod: S$GLB,,, | Performed by: COUNSELOR

## 2024-01-09 PROCEDURE — 99999 PR PBB SHADOW E&M-EST. PATIENT-LVL III: CPT | Mod: PBBFAC,,, | Performed by: COUNSELOR

## 2024-01-09 PROCEDURE — 92526 ORAL FUNCTION THERAPY: CPT | Mod: PN

## 2024-01-09 NOTE — PROGRESS NOTES
[facility]  Speech Therapy Treatment Note- Dysphagia  Date: 1/9/2024     Name: Ayla Dela Cruz   MRN: [id number]   Therapy Diagnosis:   Encounter Diagnosis   Name Primary?    Dysphagia, unspecified type Yes       Physician: Xavier Trejo,*  Physician Orders: Ambulatory Referral to Speech Therapy   Medical Diagnosis: TIA (transient ischemic attack) [G45.9]     Visit #/ Visits Authorized: 1/ 20 (1 previous)  Date of Evaluation:  12/22/2023  Insurance Authorization Period: 12/29/2023 to 12/31/2023  Plan of Care Expiration Date:    2/16/2023  Extended Plan of Care:  N/A   Progress Note: 1/25/2024     Time In:  1300  Time Out:  1345  Total Billable Time: 45     Precautions: Standard, Dysphagia, and Cardiac  Subjective:   Patient reports: Had a good weekend, with no major changes or concerns noted. Follow-up appointment on 1/11/2024 with Dr. Caterina Mccoy.    She was compliant to home exercise program.- practiced oral preparatory sets, did not work on effortful swallow.  Response to previous treatment: good  Pain Scale: no pain indicated throughout session  Objective:   UNTIMED  Procedure   Dysphagia Therapy        Short Term Goals: (4 weeks) Current Progress:   Patient will successfully participate in Fiberoptic Endoscopic Evaluation of Swallow to visualize oropharyngeal swallow function, rule out aspiration, and determine least restrictive diet.    Progressing/ Not Met 1/9/2024   Scheduled for 1/10/2023 at [facility].    Patient expressed concerns and nervous feelings going forward with FEES. Discussed FEEs procedure step by step, given images of where the scope is transported through nasal cavity, and the normal/aspiration view of the structures clinician will be able to visualize during procedure. Went through the same PO trials to prepare in reduce anxiety when eating mixed or regular solid consistencies.   Patient will independently utilize the swallow strategy of oral preparatory set to  "consume liquids and solids 60-75 times per session.     Progressing/ Not Met 2024   Patient completed 15 oral preparatory sets of thin liquid trials given min verbal and visual cues.    Met x1   3. Patient will decrease laryngeal and thyrohyoid space tension utilizing circumlaryngeal massage 2-5x/day and/or prior to oral intake with digital pressure.     Progressing/ Not Met 2024   Introduced, Not formally addressed this session.      4. Patient will independently demonstrate adequate use of effortful swallow technique to improve overall swallow strength, safety and efficiency 60-75x per session.       Progressing/ Not Met 2024    Patient completed 30 effortful swallows during thin liquid, puree, and regular solid PO trials beginning with minimal verbal and visual instruction, transitioning to independent production towards end of session.     Patient produced 3 throat clears and 1 productive volitional cough during PO trials.    Met x1   5. Patient will demonstrate the ability to adequately self-monitor swallowing skills and perform appropriate compensatory techniques to reduce s/s of aspiration     Progressing/ Not Met 2024   Patient continued self-monitoring swallow skills while completing swallow strategies and exercises.      -Notes minimal instances of coughing or "choking" when eating or drinking at home since utilizing oral preparatory set technique.   6. Patient will participate in dysphagia education including clinical signs of aspiration with returned demonstration of information.     Progressing/ Not Met 2024   *See education below     Patient Education/Response:   Patient educated regarding the followin. Normal and disordered swallow function and anatomy education.  3. Fiberoptic endoscopic evaluation of swallowing- education of procedure steps, risks, and purpose.  4. Swallow strategies and exercises to increase safety and efficiency of swallow.  5. Strategies to reduce swallow " anxiety when eating and/or drinking    Patient and  both expressed understanding-they were given handouts of all education addressed above.    Home program established: Program modified based on patient progress  Patient verbalized understanding to all above education provided.     See Electronic Medical Record under Patient Instructions for exercises provided throughout therapy.  Assessment:   Ayla actively participated well in today's session which focused on dysphagia exercises, dysphagia strategies, and education. Today strengths were noted in implementation of learned strategies with increased repetition of exercises, meeting 2 session goals. Improvement continues to be noted in swallow breath coordination and improve respiratory strength for productive cough. Difficulties remain in anxiety with swallowing solid, easy to chew, or soft bite sized consistencies. Cognitive, Physical, and Emotional fatigue was not believed to have been a barrier to the session. To date, Ayla has met 0 goals.     Ayla is progressing well towards her goals. Current goals remain appropriate. Goals to be updated as necessary.     Patient prognosis is Good. Patient will continue to benefit from skilled outpatient speech and language therapy to address the deficits listed in the problem list on initial evaluation, provide patient/family education and to maximize patient's level of independence in the home and community environment.   Medical necessity is demonstrated by the following IMPAIRMENTS:  Dysphagia: Decreased safety and efficiency of swallowing function on PO intake without overt s/s of aspiration for the highest diet level.   Barriers to Therapy: No barriers to therapy identified  Patient's spiritual, cultural and educational needs considered and patient agreeable to plan of care and goals.  Plan:   Continue Plan of Care with focus on rehabilitation and compensation for dysphagia.    Danny De La Torre M.S.,  CF-SLP  Speech-Language Pathologist Resident  1/9/2024

## 2024-01-09 NOTE — PROGRESS NOTES
"Initial Assessment LPC  1/9/24    Site/Location:  [facility]    Visit Type: 60 min outpt Initial Assessment     Participants: Ct and this clinician.    Therapeutic Intervention: Met with patient Outpatient - Initial Assessment  60 min -  [id number]    Chief complaint/reason for encounter: Depression / Anxiety    Interval history and content of current session: Ct is an 78 year old female who is resent today for an evaluation to begin psychotherapy. She is accompanied by her  who reported that the ct was referred by her ENT doctor because the  told the ENT he believes her swallowing issues are psychosomatic. Ct presents as calm, disoriented poor memory recall. Ct's  speaks mostly for the ct. Ct has a hx of TIA and cognitive impairment. Ct's  reported she has good short term memory but very poor long term memory. "I show her pictures of people she knows but she can't remember them." Due to ct's cognitive impairment it was advised to the ct and her  that psychotherapy would not be appropriate. This clinician will consult with ct's prescriber at this clinic regarding other resources/referrals that may be appropriate for the ct and support for the . Clinician spent 10 minutes with the ct. Ct denied any current or recent SI/HI per the suicide risk assessment.     Treatment plan:  Target symptoms: depression/anxiety  Why chosen therapy is appropriate versus another modality: Relevant to diagnosis  Outcome monitoring methods: self-report. CSSRS. PHQ-9  Therapeutic intervention type: supportive psychotherapy. Motivational interviewing.     Risk parameters:  Patient reports no suicidal ideation  Patient reports no homicidal ideation  Patient reports no self-injurious behavior  Patient reports no violent behavior    Verbal deficits: None    Patient's response to intervention:  The patient's response to intervention is accepting.    Progress toward goals and other mental status " changes:  The patient's progress toward goals is good.    Diagnosis:       Plan:  Individual psychotherapy weekly. Utilize IFS therapy and memory reconsolidation to raise emotional tolerance and decrease negative symptoms. Ct acknowledged plan and is agreement with the plan.    Return to clinic: 1 week    Length of Service (minutes): 10       Each patient to whom he or she provides medical services by telemedicine is: (1) informed of the relationship between the physician and patient and the respective role of any other health care provider with respect to management of the patient; and (2) notified that he or she may decline to receive medical services by telemedicine and may withdraw from such care at any time.

## 2024-01-10 ENCOUNTER — PATIENT MESSAGE (OUTPATIENT)
Dept: GASTROENTEROLOGY | Facility: CLINIC | Age: 79
End: 2024-01-10
Payer: MEDICARE

## 2024-01-10 ENCOUNTER — CLINICAL SUPPORT (OUTPATIENT)
Dept: REHABILITATION | Facility: HOSPITAL | Age: 79
End: 2024-01-10
Payer: MEDICARE

## 2024-01-10 DIAGNOSIS — R13.10 DYSPHAGIA, UNSPECIFIED TYPE: ICD-10-CM

## 2024-01-10 PROCEDURE — 92612 ENDOSCOPY SWALLOW (FEES) VID: CPT | Mod: PO

## 2024-01-10 NOTE — Clinical Note
Good afternoon Dr. Christian,  This patient has seen you in the past. She has some swallowing issues with solids and pills that appear to be progressing, but she doesn't present like a typical esophageal dysphagia. I don't know if she would benefit more from an esophagram or an EGD, but I do think she would benefit from a workup from you. She has lost 28 pounds in a year and is self restricted to liquids and purees only.   Thanks, Pallavi Blair M.A. CCC-SLP, CBIS Speech Language Pathologist Certified Brain Injury Specialist 1/10/2024

## 2024-01-10 NOTE — PLAN OF CARE
[facility]  Fiberoptic Endoscopic Evaluation of Swallowing (FEES)    Date: 1/10/2024     Name: Ayla Dela Cruz   MRN: [id number]    Therapy Diagnosis:   Encounter Diagnosis   Name Primary?    Dysphagia, unspecified type       Physician: Xavier Trejo,*  Physician Orders: Ambulatory Referral to Speech Therapy-FEES  Order Date: 12/27/2023   Medical Diagnosis from Referral: dysphagia    Visit #/Visits authorized: 1/ 1  Date of Evaluation:  1/10/2024   Insurance Authorization Period:  12/27/2023 - 12/26/2024    Plan of Care Expiration: Evaluation Only    Time In: 1100  Time Out: 1130    Procedure Min.   Flexible fiberoptic endoscopic evaluation of  swallowing by cine or video recording (CPT 12280)  30     Precautions:Standard, Fall, and Dysphagia  Subjective   Date of Onset: Progressing over the last year  History of Current Condition:  Ayla Dela Cruz was referred by Dr. Trejo for a FEES (CPT 52318) to objectively assess anatomy and physiology of the pharyngeal swallow, rule out silent aspiration, determine the safest possible diet, and examine the effectiveness of compensatory strategies. Patient's current nutritional avenue is oral. She reports some regular solid foods, but is mainly restricted to liquids and purees with supplementing with very soft solids. She reports she is unsure if its a fear of swallowing. States witnessed grandmother choking episode several years ago and is now the same age as that grandmother was.     The following observations were made:   -Mental status: Alert and Cooperative  -Factors affecting performance: no difficulties participating in the study  -Feeding Method: independent in self-feeding    Respiratory Status: room air    Past Medical History: Ayla Dela Cruz  has a past medical history of Anticoagulant long-term use, Anxiety, Arthritis, Atrial fibrillation, Basal cell carcinoma, Cancer, CHF (congestive heart failure),  Depression, DVT (deep venous thrombosis), GERD (gastroesophageal reflux disease), Glaucoma (increased eye pressure), Hyperlipidemia, Hypertension, Interstitial lung disease, Localized hives (01/10/2020), Mild persistent asthma without complication (11/12/2018), Pacemaker, Pneumonia (01/31/2014), Stroke (06/03/2014), Stroke, TIA (transient ischemic attack), and TIA (transient ischemic attack).  Ayla Dela Cruz  has a past surgical history that includes 2 heart ablations; bilateral cataracts; pyloristenosis; Tonsillectomy; skin cancer removal ; Cholecystectomy; Colonoscopy (N/A, 1/19/2017); Injection of anesthetic agent around medial branch nerves innervating cervical facet joint (Right, 6/7/2018); Radiofrequency thermal coagulation of medial branch of posterior ramus of cervical spinal nerve (Right, 7/3/2018); Radiofrequency ablation of lumbar medial branch nerve at single level (Bilateral, 9/21/2018); Open reduction and internal fixation (ORIF) of injury of ankle (Right, 11/1/2018); Radiofrequency ablation of lumbar medial branch nerve at single level (Bilateral, 2/19/2019); Radiofrequency thermal coagulation of medial branch of posterior ramus of cervical spinal nerve (Right, 7/23/2019); Radiofrequency thermocoagulation (Bilateral, 9/10/2019); Colonoscopy (N/A, 10/10/2019); Esophagogastroduodenoscopy (N/A, 10/14/2019); Radiofrequency ablation of lumbar medial branch nerve at single level (Bilateral, 3/10/2020); Radiofrequency thermal coagulation of medial branch of posterior ramus of cervical spinal nerve (Right, 6/23/2020); Radiofrequency ablation of lumbar medial branch nerve at single level (Bilateral, 9/11/2020); Radiofrequency ablation of lumbar medial branch nerve at single level (Bilateral, 5/28/2021); A-V cardiac pacemaker insertion (Left, 10/14/2021); paracentesis, eye, anterior chamber, with aqueous removal (Right, 7/27/2022); and Anterior vitrectomy (Right, 7/27/2022).  Medical Hx and Allergies:   Ayla has a current medication list which includes the following prescription(s): apixaban, aspirin, atropine 1%, buspirone, dorzolamide-timolol 2-0.5%, latanoprost, lorazepam, pantoprazole, propranolol, triamcinolone acetonide 0.1%, trintellix, and [DISCONTINUED] brimonidine 0.2%, and the following Facility-Administered Medications: bupivacaine (pf) 0.25% (2.5 mg/ml), lidocaine (pf) 10 mg/ml (1%), lidocaine (pf) 20 mg/ml (2%), and methylprednisolone acetate.   Review of patient's allergies indicates:   Allergen Reactions    Gabapentin Hallucinations    Xarelto [rivaroxaban] Rash    Bactrim [sulfamethoxazole-trimethoprim] Other (See Comments)     Upset stomach, dry heaves, confusion    Afrin (pseudoephedrine)     Amoxicillin-pot clavulanate      Other reaction(s): Mental Status Change    Atorvastatin      Other reaction(s): Joint pain    Baclofen     Baclofen (bulk) Nausea And Vomiting    Ciprofloxacin     Decongest tabs      Other reaction(s): increased heart rate    Decongestant [pseudoephedrine hcl]     Erythromycin Other (See Comments)    Flecainide Hives     And SOB. No reaction to Lidocaine     Fluoxetine      Other reaction(s): heart palpitations  Other reaction(s): anxiety    Lisinopril Other (See Comments)     cough    Losartan Other (See Comments)     Hypotension with lightheadedness    Morphine Other (See Comments)     confusion    Tramadol Other (See Comments)     SOB, low BP    Venlafaxine     Venlafaxine analogues      Changes in BP and increases heart rate       Afrin [oxymetazoline] Palpitations    Caffeine Palpitations    Dabigatran etexilate Rash    Plavix [clopidogrel] Rash    Tizanidine Anxiety     dizziness     Pneumonia History: No  Previous MBSS:  No  Previous FEES:   No  Prior Therapy:  yes, current outpatient Plan of Care   Social History:  lives with    Pain:   0/10  Pain Location / Description: no pain indicated throughout session   Patient's Therapy Goals:  be able to swallow  better   Objective     Procedure: A flexible fiberoptic nasoendoscope was passed transnasally to view the nasopharynx, oropharynx, hypopharynx, and larynx. 21 swallow trials using 1/2 teaspoon to 3 ounce amounts of real foods of thin liquid, nectar-thick liquid, puree, soft-mechanical, and solid consistencies were video recorded and analyzed using transnasal endoscopy. A clinical swallow evaluation (CPT 14182) was completed in conjunction with the FEES in order to evaluate the oral structures required for functional swallowing.   Scope Time: 12 minutes and 48 seconds      Results revealed:     Nasopharyngoscopic, pharyngoscopic, and laryngeal findings:  Nasopharyngoscopic Findings Velopharyngeal function WFL    Anatomic findings WNL   Pharyngoscopic & laryngoscopic findings Secretions Within normal limits    Vogt Secretion Scale 0: Most normal rating. No visible secretions anywhere in the hypopharynx or some transient bubble visible in the valleculae and pyriform sinuses.    Vocal fold motion WFL- complete bilateral vocal fold abduction/adduction  Glottal phillips    Sensory integrity Appears functional- swallow initiated to penetration material, cough or throat clear to aspiration, and/or spontaneous swallow to significant residue    Anatomic findings Within normal limits       Consistency  Findings Rosenbeck's Penetration/Aspiration Scale (PAS) Strategies   Thin (IDDSI 0)    1/2 teaspoon x1  Full teaspoon x2  Self-regulated cup sip x5   Consecutive cup sip x1   Consecutive straw sip x2 Vallecular residue:  none to trace residue bilaterally    Pyriform sinus residue: none present    Other residue: none present Best: (1) Material does not enter the airway    Worst: (4) Material enters the airway, contacts the vocal folds, and is ejected from the airway  Penetration occurred during the swallow over rim of the epiglottis    None completed nor needed    Nectar thick (mildly thick/IDDSI 2)    1/2 teaspoon x1  Full teaspoon  x2 Vallecular residue:  none to trace residue bilaterally    Pyriform sinus residue: none present    Other residue: none present Best: (1) Material does not enter the airway    Worst: (1) Material does not enter the airway   None completed nor needed    Puree (extremely thick/IDDSI 4)    1/2 teaspoon x1  Full teaspoon x2 Vallecular residue:  none to trace residue bilaterally    Pyriform sinus residue: none present    Other residue: none present Best: (1) Material does not enter the airway    Worst: (1) Material does not enter the airway   Liquid wash-effective      Mixed consistency (thin/ IDDSI 0 + soft and bite sized/ IDDSI 6)    - 1 peach in juice x1  - 2 peaches in juice x2 Vallecular residue: none present    Pyriform sinus residue: none present    Other residue: none present Best: (1) Material does not enter the airway    Worst: (4) Material enters the airway, contacts the vocal folds, and is ejected from the airway  Penetration occurred during the swallow over rim of the epiglottis   Spontaneous/reflexive swallows-effective    Liquid wash-effective      Solid (regular/ IDDSI 7)    - bite of cookie x1 Vallecular residue: none present    Pyriform sinus residue: none present    Other residue: none present Best: (1) Material does not enter the airway    Worst: (1) Material does not enter the airway   Liquid wash-effective        Recommend MBSS: No    Treatment   Treatment Time In: n/a  Treatment Time Out: n/a  Total Treatment Time: n/a  No treatment performed 2/2 time to administer evaluation    Education: Plan of Care, role of SLP in care, and recommendations   were discussed with the patient. Patient and family members expressed understanding of all discussed.     Home Program: not yet established   Assessment   Ayla is a 78 y.o. female referred to outpatient Speech Therapy with a medical diagnosis of Dysphagia, unspecified type [R13.10] . Results of this FEES revealted that the pt presents with presbyphagia   as defined by the dysphagia outcome and severity scale (adapted for FEES by ESTHER Rios,  Swallowing Services, [facility] from O'Refugio et al 1999).     Oral phase findings Posterior containment Within normal limits    Mastication Prolonged    Clearance Within normal limits   Pharyngeal phase findings Initiation of the swallow Timely    Base of tongue retraction mildly impaired    Epiglottic movement Within normal limits    Pharyngeal contraction mildly reduced pharyngeal wall contraction    Laryngeal vestibule closure Functional    PES opening Within normal limits    Other findings Multiple spontaneous swallows per bolus      Presbypahgia present, but unclear the full extend to make her restrict her consistencies. Query an esophageal component of the swallow compounded by fear. Unable to rule out neurological involvement.   Both swallow safety and swallow efficiency are preserved,. Patient appears to be at low risk for aspiration related pneumonia from a primary oropharyngeal dysphagia in consideration of three pillars of aspiration pneumonia (Blanca, 2005) including oral health status, overall health/immune status, and laryngeal vestibule closure/severity of dysphagia. However, unable to assess risk related to aspiration pneumonia cause by the aspiration of gastric content. Patient at low risk for malnutrition/dehydration. Patient appears to be a good candidate for behavioral swallow rehabilitation.     Consistency Recommendations: thin liquids (IDDSI 0) and regular consistencies (IDDSI 7) as tolerated.     Functional Oral Intake Scale (FOIS)  The Functional Oral Intake Scale (FOIS) is an ordinal scale that is used to assess the current status and meaningful change in the oral intake. FOIS levels include:    TUBE DEPENDENT (levels 1-3) 1. No oral intake  2. Tube dependent with minimal/inconsistent oral intake  3. Tube supplements with consistent oral intake      TOTAL ORAL INTAKE (levels 4-7) 4. Total oral intake of  a single consistency  5. Total oral intake of multiple consistencies requiring special preparation  6. Total oral intake with no special preparation, but must avoid specific foods or liquid items  7. Total oral intake with no restrictions     Patient is currently judged to be at FOIS level 5.      Demonstrates impairments including limitations as described in the problem list. Pt's spiritual, cultural and educational needs considered and pt agreeable to plan of care and goals.  Plan   Plan of Care Certification: 1/10/2024  to 1/10/2024     Recommended Treatment Plan: Continue with current outpatient Plan of Care     Other Recommendations:   - Aggressive oral care at least twice daily (morning and bedtime) is strongly recommended to reduce bacteria on oropharyngeal surfaces which may increase the risk of nosocomial infections, including pneumonia.   - Monitor for any signs/symptoms of aspiration (such as wet/gurgly voice that does not clear with coughing, inability to make any voice sounds, any persistent coughing with oral intake, otherwise unexplained fever, unexplained increased or new difficulty or discomfort breathing, unexplained increase in sleepiness/lethargy/significant fatigue, unexplained increase or new onset confusion or change in cognitive functioning, or any other unexplained change in health or well-being that could be related to swallowing).  - Risk Management: use good oral hygiene , sit upright for all PO intake, and increase physical mobility as tolerated  -Specialist Referrals: GI  -Follow-up exam: Follow up swallow study is not indicated at this time.    Therapist's Name:   Pallavi Blair CCC-SLP     Date: 1/10/2024

## 2024-01-11 ENCOUNTER — TELEPHONE (OUTPATIENT)
Dept: FAMILY MEDICINE | Facility: CLINIC | Age: 79
End: 2024-01-11
Payer: MEDICARE

## 2024-01-11 DIAGNOSIS — R13.10 DYSPHAGIA, UNSPECIFIED TYPE: Primary | ICD-10-CM

## 2024-01-11 NOTE — TELEPHONE ENCOUNTER
----- Message from Akanksha Childers LPN sent at 1/11/2024 10:40 AM CST -----  Regarding: BLOOD THINNER CLEARANCE  Good morning, Ms. Dela Cruz is scheduled for an EGD on 1/25 with Dr. Schwarz. We are requesting clearance to hold her Eliquis 2 days prior to her procedure. Please advise.     Sincerely,   NITHYA Vale

## 2024-01-11 NOTE — TELEPHONE ENCOUNTER
She may hold the Eliquis for two days prior to the procedure.  If no active bleeding is found and no biopsies are needed I would request that she resume it immediately after the procedure.

## 2024-01-11 NOTE — TELEPHONE ENCOUNTER
She may hold the Eliquis for two days prior to the procedure.  If no active bleeding is found and no biopsies are needed I would request that she resume it immediately after the procedure

## 2024-01-12 ENCOUNTER — CLINICAL SUPPORT (OUTPATIENT)
Dept: REHABILITATION | Facility: HOSPITAL | Age: 79
End: 2024-01-12
Payer: MEDICARE

## 2024-01-12 DIAGNOSIS — R13.10 DYSPHAGIA, UNSPECIFIED TYPE: Primary | ICD-10-CM

## 2024-01-12 PROCEDURE — 92526 ORAL FUNCTION THERAPY: CPT | Mod: PN

## 2024-01-12 NOTE — PROGRESS NOTES
[facility]  Speech Therapy Treatment Note- Dysphagia  Date: 1/12/2024     Name: Ayla Dela Cruz   MRN: [id number]   Therapy Diagnosis:   Encounter Diagnosis   Name Primary?    Dysphagia, unspecified type Yes     Physician: Xavier Trejo,*  Physician Orders: Ambulatory Referral to Speech Therapy   Medical Diagnosis: TIA (transient ischemic attack) [G45.9]     Visit #/ Visits Authorized: 2/ 20 (1 previous)  Date of Evaluation:  12/22/2023  Insurance Authorization Period: 12/29/2023 to 12/31/2023  Plan of Care Expiration Date:    2/16/2023  Extended Plan of Care:  N/A   Progress Note: 1/25/2024     Time In:  1300  Time Out:  1345  Total Billable Time: 45     Precautions: Standard, Dysphagia, and Cardiac  Subjective:   Patient reports: Completed FEES procedure and follow-up appointment with Dr. Caterina Mccoy.    She was compliant to home exercise program.- practiced oral preparatory sets, did not work on effortful swallow.  Response to previous treatment: good  Pain Scale: no pain indicated throughout session  Objective:   UNTIMED  Procedure   Dysphagia Therapy        Short Term Goals: (4 weeks) Current Progress:   Patient will successfully participate in Fiberoptic Endoscopic Evaluation of Swallow to visualize oropharyngeal swallow function, rule out aspiration, and determine least restrictive diet.     GOAL MET 1/10/2023      Patient will independently utilize the swallow strategy of oral preparatory set to consume liquids and solids 60-75 times per session.     Progressing/ Not Met 1/12/2024   Patient completed 60 oral preparatory sets of thin liquid and puree trials given min verbal and visual cues.    Met x2   3. Patient will decrease laryngeal and thyrohyoid space tension utilizing circumlaryngeal massage 2-5x/day and/or prior to oral intake with digital pressure.     Progressing/ Not Met 1/12/2024   Introduced, Not formally addressed this session.      4. Patient will independently  "demonstrate adequate use of effortful swallow technique to improve overall swallow strength, safety and efficiency 60-75x per session.       Progressing/ Not Met 1/12/2024   Patient completed 60 effortful swallows during thin liquid and puree PO trials beginning with minimal verbal and visual instruction, transitioning to independent production towards end of session.     No over s/s of aspiration noted.    Met x2   5. Patient will demonstrate the ability to adequately self-monitor swallowing skills and perform appropriate compensatory techniques to reduce s/s of aspiration     Progressing/ Not Met 1/12/2024   Patient continued self-monitoring swallow skills while completing swallow strategies and exercises.      -Notes minimal instances of coughing or "choking" when eating or drinking at home since utilizing oral preparatory set technique.   6. Patient will participate in dysphagia education including clinical signs of aspiration with returned demonstration of information.     Progressing/ Not Met 1/12/2024   *See education below     Patient Education/Response:   Patient educated regarding the following:  Swallow strategies and exercises to increase safety and efficiency of swallow.  2.  Strategies to reduce swallow anxiety when eating and/or drinking    Patient and  both expressed understanding-they were given handouts of all education addressed above.    Home program established: Program modified based on patient progress  Patient verbalized understanding to all above education provided.     See Electronic Medical Record under Patient Instructions for exercises provided throughout therapy.  Assessment:   Ayla actively participated well in today's session which focused on dysphagia exercises, dysphagia strategies, and education. Today strengths were noted in implementation of learned strategies with increased repetition of exercises, meeting 2 session goals. Improvement continues to be noted in swallow " breath coordination and improve respiratory strength for productive cough. Difficulties remain in anxiety with swallowing solid, easy to chew, or soft bite sized consistencies. Cognitive, Physical, and Emotional fatigue was not believed to have been a barrier to the session. To date, Ayla has met 0 goals.     Ayla is progressing well towards her goals. Current goals remain appropriate. Goals to be updated as necessary.     Patient prognosis is Good. Patient will continue to benefit from skilled outpatient speech and language therapy to address the deficits listed in the problem list on initial evaluation, provide patient/family education and to maximize patient's level of independence in the home and community environment.   Medical necessity is demonstrated by the following IMPAIRMENTS:  Dysphagia: Decreased safety and efficiency of swallowing function on PO intake without overt s/s of aspiration for the highest diet level.   Barriers to Therapy: No barriers to therapy identified  Patient's spiritual, cultural and educational needs considered and patient agreeable to plan of care and goals.  Plan:   Continue Plan of Care with focus on rehabilitation and compensation for dysphagia.    Danny De La Torre M.S., CF-SLP  Speech-Language Pathologist Resident  1/12/2024

## 2024-01-16 NOTE — PROGRESS NOTES
Outpatient Psychiatry Follow-Up Visit    Clinical Status of Patient: Outpatient (Ambulatory)  01/17/2024     Chief Complaint: 78 y/o female presenting today with  for a follow-up.       Interval History and Content of Current Session:  Interim Events/Subjective Report/Content of Current Session: 78 y/o female follow-up appointment.    Pt is a 78 y/o female with past psychiatric hx of depression, anxiety, eating disorder who presents for follow-up treatment. Pt reported that things are going well overall. Had some numbness in cheek and followed up with neuro (Dr. Suarez). Was told that numbness in cheek and dysphagia were psychosomatic. Pt continues to wake with panic symptoms several days per week. Complains of nasal congestion, which triggers fears of breathing. Has started speech therapy 2x per week, which appears to targetting dysphagia.     Past Psychiatric hx: remeron, rexulti, vraylar, Limbitrol, venlafaxine, fluoxetine, Abilify, gabapentin, amitriptyline, buspirone, cannot remember others.     Past Medical hx:   Past Medical History:   Diagnosis Date    Anticoagulant long-term use     Anxiety     Arthritis     Atrial fibrillation     Basal cell carcinoma     Cancer     skin    CHF (congestive heart failure)     Depression     DVT (deep venous thrombosis)     GERD (gastroesophageal reflux disease)     Glaucoma (increased eye pressure)     Hyperlipidemia     diet controlled    Hypertension     Interstitial lung disease     Localized hives 01/10/2020    Mild persistent asthma without complication 11/12/2018    Pacemaker     Pneumonia 01/31/2014    Stroke 06/03/2014    Stroke     TIA (transient ischemic attack)     TIA (transient ischemic attack)         Interim hx:  Medication changes last visit: Increase ativan 0.5 mg TID PRN  Anxiety: decrease  Depression: decrease     Denies suicidal/homicidal ideations.  Denies hopelessness/worthlessness.    Denies auditory/visual hallucinations      Alcohol:  Infrequent use  Drug: Pt denied  Caffeine: Not assessed  Tobacco: Pt denied      Review of Systems   PSYCHIATRIC: Pertinent items are noted in the narrative.        CONSTITUTIONAL: weight stable    Past Medical, Family and Social History: The patient's past medical, family and social history have been reviewed and updated as appropriate within the electronic medical record. See encounter notes.     Current Psychiatric Medication:  ativan 0.5 mg TID PRN, buspirone 10 mg TID, trintillex 20 mg     Compliance: yes      Side effects: Pt denied     Risk Parameters:  Patient reports no suicidal ideation  Patient reports no homicidal ideation  Patient reports no self-injurious behavior  Patient reports no violent behavior     Exam (detailed: at least 9 elements; comprehensive: all 15 elements)   Constitutional  Vitals:  Most recent vital signs, dated less than 90 days prior to this appointment, were reviewed. Pulse:  [59]   BP: (110)/(70)       General:  unremarkable, age appropriate, casual attire, good eye contact, good rapport       Musculoskeletal  Muscle Strength/Tone:  no flaccidity, no tremor    Gait & Station:  normal      Psychiatric                       Speech:  normal tone, normal rate, rhythm, and volume   Mood & Affect:   Mildly Depressed, anxious         Thought Process:   Goal directed; Linear    Associations:   intact   Thought Content:   No SI/HI, delusions, or paranoia, no AV/VH   Insight & Judgement:   Good, adequate to circumstances   Orientation:   grossly intact; alert and oriented x 4    Memory:  intact for content of interview    Language:  grossly intact, can repeat    Attention Span  : Grossly intact for content of interview   Fund of Knowledge:   intact and appropriate to age and level of education        Assessment and Diagnosis   Status/Progress: Based on the examination today, the patient's problem(s) is/are adequately controlled.  New problems have not been presented today. Co-morbidities are  not complicating management of the primary condition. There are no active rule-out diagnoses for this patient at this time.      Impression: Pt continues to experience anxiety with infrequent panic attacks. Pt continues to catastrophize physcial sensations. Discussed secondary gain possibilities and responses from . Will start trial of mirtazapine for mood and sleep concerns. Will continue with medication plan save for above change and monitor symptoms moving forward.     Diagnosis:   1) Major Depressive Disorder, recurrent, moderate  2) Generalized Anxiety Disorder  3) Specific Phobia  4) Panic Disorder   5) Personality Disorder traits  6) R/o Dementia of unknown etiology  Intervention/Counseling/Treatment Plan   Medication Management:      1. ativan 0.5 mg TID PRN    2. buspirone to 10 mg TID    3. trintillex to 20 mg    4. Start trial of mirtazapine 7.5 mg     5. Call to report any worsening of symptoms or problems with the medication. Pt instructed to go to ER with thoughts of harming self, others     6. Cont in therapy as needed    Psychotherapy: 45 minutes  Target symptoms: anxiety  Why chosen therapy is appropriate versus another modality: CBT used; relevant to diagnosis, patient responds to this modality  Outcome monitoring methods: self-report, observation  Therapeutic intervention type: Cognitive Behavioral Therapy, Exposure therapy  Topics discussed/themes: building skills sets for symptom management, symptom recognition, nutrition, exercise  The patient's response to the intervention is accepting  Patient's response to treatment is: good.   The patient's progress toward treatment goals: limited to fair     Return to clinic: 1 month    -Cognitive-Behavioral/Supportive therapy and psychoeducation provided  -R/B/SE's of medications discussed with the pt who expresses understanding and chooses to take medications as prescribed.   -Pt instructed to call clinic, 911 or go to nearest emergency room if  sxs worsen or pt is in   crisis. The pt expresses understanding.    Galo Lipscomb, PhD, MP     Antidepressant/Antianxiety Medication Initiation:  Patient informed of risks, benefits, and potential side effects of medication and accepts informed consent.  Common side effects include nausea, fatigue, headache, insomnia., Specifically discussed the possibility of new or worsening suicidal thoughts/depression.  Patient instructed to stop the medication immediately and seek urgent treatment if this occurs. Patient instructed not to abruptly discontinue medication without physician guidance except in cases of sudden onset or worsening of SI.       Stimulant Medication Initiation:  Patient advised of risks, benefits, and side effects of medication and accepts informed consent.  Common side effects include insomnia, irritability, jittery feeling, dry mouth, and agitation/hostility., Patient advised of potential addictive nature of medication and controlled substance classification.  Instructed to safeguard medication as no early refills can be given for lost or stolen medications.       Benzodiazepine Initiation:  Patient advised of the risks, benefits, and common side effects of medication and has accepted informed consent.  Common side effects include drowsiness, impaired coordination, possible memory loss., Patient advised NOT to operate a vehicle or machinery untiil they are sure how the medication will affec them.  Client also advised of danger of mixing this medication with alcohol., Patient advised of potential addictive nature of medication and need to safeguard medication as no early refills for lost or stolen medications can be authorized.

## 2024-01-17 ENCOUNTER — CLINICAL SUPPORT (OUTPATIENT)
Dept: REHABILITATION | Facility: HOSPITAL | Age: 79
End: 2024-01-17
Payer: MEDICARE

## 2024-01-17 ENCOUNTER — OFFICE VISIT (OUTPATIENT)
Dept: PSYCHIATRY | Facility: CLINIC | Age: 79
End: 2024-01-17
Payer: MEDICARE

## 2024-01-17 VITALS
WEIGHT: 105.63 LBS | BODY MASS INDEX: 16.58 KG/M2 | HEART RATE: 59 BPM | DIASTOLIC BLOOD PRESSURE: 70 MMHG | SYSTOLIC BLOOD PRESSURE: 110 MMHG | HEIGHT: 67 IN

## 2024-01-17 DIAGNOSIS — F41.9 ANXIETY: ICD-10-CM

## 2024-01-17 DIAGNOSIS — F41.0 PANIC ATTACKS: Primary | ICD-10-CM

## 2024-01-17 DIAGNOSIS — F33.1 MAJOR DEPRESSIVE DISORDER, RECURRENT, MODERATE: ICD-10-CM

## 2024-01-17 DIAGNOSIS — R13.10 DYSPHAGIA, UNSPECIFIED TYPE: Primary | ICD-10-CM

## 2024-01-17 DIAGNOSIS — R41.89 COGNITIVE IMPAIRMENT: ICD-10-CM

## 2024-01-17 PROCEDURE — 1159F MED LIST DOCD IN RCRD: CPT | Mod: CPTII,S$GLB,, | Performed by: PSYCHOLOGIST

## 2024-01-17 PROCEDURE — 3078F DIAST BP <80 MM HG: CPT | Mod: CPTII,S$GLB,, | Performed by: PSYCHOLOGIST

## 2024-01-17 PROCEDURE — 99214 OFFICE O/P EST MOD 30 MIN: CPT | Mod: S$GLB,,, | Performed by: PSYCHOLOGIST

## 2024-01-17 PROCEDURE — 92526 ORAL FUNCTION THERAPY: CPT | Mod: PN

## 2024-01-17 PROCEDURE — 1101F PT FALLS ASSESS-DOCD LE1/YR: CPT | Mod: CPTII,S$GLB,, | Performed by: PSYCHOLOGIST

## 2024-01-17 PROCEDURE — 99999 PR PBB SHADOW E&M-EST. PATIENT-LVL III: CPT | Mod: PBBFAC,,, | Performed by: PSYCHOLOGIST

## 2024-01-17 PROCEDURE — 3074F SYST BP LT 130 MM HG: CPT | Mod: CPTII,S$GLB,, | Performed by: PSYCHOLOGIST

## 2024-01-17 PROCEDURE — 3288F FALL RISK ASSESSMENT DOCD: CPT | Mod: CPTII,S$GLB,, | Performed by: PSYCHOLOGIST

## 2024-01-17 PROCEDURE — 1157F ADVNC CARE PLAN IN RCRD: CPT | Mod: CPTII,S$GLB,, | Performed by: PSYCHOLOGIST

## 2024-01-17 PROCEDURE — 1126F AMNT PAIN NOTED NONE PRSNT: CPT | Mod: CPTII,S$GLB,, | Performed by: PSYCHOLOGIST

## 2024-01-17 PROCEDURE — 90836 PSYTX W PT W E/M 45 MIN: CPT | Mod: S$GLB,,, | Performed by: PSYCHOLOGIST

## 2024-01-17 RX ORDER — LORAZEPAM 0.5 MG/1
0.5 TABLET ORAL 3 TIMES DAILY PRN
Qty: 90 TABLET | Refills: 0 | Status: SHIPPED | OUTPATIENT
Start: 2024-01-17 | End: 2024-02-14

## 2024-01-17 RX ORDER — BUSPIRONE HYDROCHLORIDE 10 MG/1
10 TABLET ORAL 3 TIMES DAILY
Qty: 90 TABLET | Refills: 2 | Status: SHIPPED | OUTPATIENT
Start: 2024-01-17 | End: 2024-04-15

## 2024-01-17 RX ORDER — MIRTAZAPINE 7.5 MG/1
7.5 TABLET, FILM COATED ORAL NIGHTLY
Qty: 30 TABLET | Refills: 1 | Status: SHIPPED | OUTPATIENT
Start: 2024-01-17 | End: 2024-02-14 | Stop reason: SDUPTHER

## 2024-01-17 NOTE — PROGRESS NOTES
[facility]  Speech Therapy Treatment Note- Dysphagia  Date: 1/17/2024     Name: Ayla Dela Cruz   MRN: [id number]   Therapy Diagnosis:   Encounter Diagnosis   Name Primary?    Dysphagia, unspecified type Yes       Physician: Xavier Trejo,*  Physician Orders: Ambulatory Referral to Speech Therapy   Medical Diagnosis: TIA (transient ischemic attack) [G45.9]     Visit #/ Visits Authorized: 3/ 20 (1 previous)  Date of Evaluation:  12/22/2023  Insurance Authorization Period: 12/29/2023 to 12/31/2023  Plan of Care Expiration Date:    2/16/2023  Extended Plan of Care:  N/A   Progress Note: 1/25/2024     Time In:  1530  Time Out:  1620  Total Billable Time: 50     Precautions: Standard, Dysphagia, and Cardiac  Subjective:   Patient reports: She ate 3/4ths cup of meat chili for dinner the other night. She also reported her psychiatrist recommended reducing the frequency of taking her anxiety medication and begin implementing more mindfulness practices.  She was compliant to home exercise program.  Response to previous treatment: good  Pain Scale: no pain indicated throughout session  Objective:   UNTIMED  Procedure   Dysphagia Therapy        Short Term Goals: (4 weeks) Current Progress:   Patient will successfully participate in Fiberoptic Endoscopic Evaluation of Swallow to visualize oropharyngeal swallow function, rule out aspiration, and determine least restrictive diet.     GOAL MET 1/10/2023      Patient will independently utilize the swallow strategy of oral preparatory set to consume liquids and solids 60-75 times per session.     Progressing/ Not Met 1/17/2024   Patient completed 6 oral preparatory sets of thin liquid independently.    Met x2   3. Patient will decrease laryngeal and thyrohyoid space tension utilizing circumlaryngeal massage 2-5x/day and/or prior to oral intake with digital pressure.     Progressing/ Not Met 1/17/2024   Discussed use of circumlaryngeal massage to reduce  tension prior to PO intake to supplement noted anxiety induced by swallowing.    Patient participated in education, demonstration, and instruction of mindfulness practices to reduce swallow anxiety and relieve laryngeal tension. Patient produced 15 diaphragmatic breathes prior to PO intake given moderate visual and verbal cues.   4. Patient will independently demonstrate adequate use of effortful swallow technique to improve overall swallow strength, safety and efficiency 60-75x per session.       Progressing/ Not Met 1/17/2024   Patient completed 10 effortful swallows during thin liquid and mixed consistency PO trials beginning with minimal verbal and visual instruction, transitioning to independent production towards end of session.     No overt s/s of aspiration noted.    Met x2   5. Patient will demonstrate the ability to adequately self-monitor swallowing skills and perform appropriate compensatory techniques to reduce s/s of aspiration     Progressing/ Not Met 1/17/2024   Patient required min verbal cues to ensure adequate self-monitoring of swallow skills while completing swallow strategies and exercises.    6. Patient will participate in dysphagia education including clinical signs of aspiration with returned demonstration of information.     Progressing/ Not Met 1/17/2024   Goal Met 1/17/2024            Patient Education/Response:   Patient educated regarding the following:  Mindfulness exercises- diaphragmatic breathing  2.  Strategies to reduce swallow anxiety when eating and/or drinking  3. Circumlaryngeal massage  4. Minced moist diet- meal preparation recommendations and importance of motivating foods    Patient and  both expressed understanding-they were given handouts of all education addressed above.    Home program established: Program modified based on patient progress  Patient verbalized understanding to all above education provided.     See Electronic Medical Record under Patient  Instructions for exercises provided throughout therapy.  Assessment:   Ayla actively participated well in today's session which focused on dysphagia exercises, dysphagia strategies, and education. Strengths noted in reduced swallow anxiety with PO trials of mixed consistency. Improvements continue to be noted in maintaining adequate nutrition and hydration, with patient reporting no weight loss since initial evaluation. She is gradually progressing towards minced and moist (IDDSI Level 5) diet with trialing foods at home. Difficulties remain in anxiety with swallowing solid, easy to chew, or soft bite sized consistencies. Cognitive, Physical, and Emotional fatigue was not believed to have been a barrier to the session. To date, Ayla has met 2 goals.     Ayla is progressing well towards her goals. Current goals remain appropriate. Goals to be updated as necessary.     Patient prognosis is Good. Patient will continue to benefit from skilled outpatient speech and language therapy to address the deficits listed in the problem list on initial evaluation, provide patient/family education and to maximize patient's level of independence in the home and community environment.   Medical necessity is demonstrated by the following IMPAIRMENTS:  Dysphagia: Decreased safety and efficiency of swallowing function on PO intake without overt s/s of aspiration for the highest diet level.   Barriers to Therapy: No barriers to therapy identified  Patient's spiritual, cultural and educational needs considered and patient agreeable to plan of care and goals.  Plan:   Continue Plan of Care with focus on rehabilitation and compensation for dysphagia.    Danny De La Torre M.S., CF-SLP  Speech-Language Pathologist Resident  1/17/2024

## 2024-01-19 ENCOUNTER — CLINICAL SUPPORT (OUTPATIENT)
Dept: REHABILITATION | Facility: HOSPITAL | Age: 79
End: 2024-01-19
Payer: MEDICARE

## 2024-01-19 DIAGNOSIS — R13.10 DYSPHAGIA, UNSPECIFIED TYPE: Primary | ICD-10-CM

## 2024-01-19 PROCEDURE — 92526 ORAL FUNCTION THERAPY: CPT | Mod: PN

## 2024-01-19 NOTE — PROGRESS NOTES
[facility]  Speech Therapy Treatment Note- Dysphagia  Date: 1/19/2024     Name: Ayla Dela Cruz   MRN: [id number]   Therapy Diagnosis:   Encounter Diagnosis   Name Primary?    Dysphagia, unspecified type Yes     Physician: Xavier Trejo,*  Physician Orders: Ambulatory Referral to Speech Therapy   Medical Diagnosis: TIA (transient ischemic attack) [G45.9]     Visit #/ Visits Authorized: 4/ 20 (1 previous)  Date of Evaluation:  12/22/2023  Insurance Authorization Period: 12/29/2023 to 12/31/2023  Plan of Care Expiration Date:    2/16/2023  Extended Plan of Care:  N/A   Progress Note: 1/25/2024     Time In:  1300  Time Out:  1325  Total Billable Time: 25     Precautions: Standard, Dysphagia, and Cardiac  Subjective:   Patient reports: She is not having a really good day. She is experiencing numbness in her face again. Started taking Claritin instead of Benadryl to address severe allergies. EGD scheduled on 1/25/2024.   She was compliant to home exercise program.  Response to previous treatment: good  Pain Scale: no pain indicated throughout session  Objective:   UNTIMED  Procedure   Dysphagia Therapy        Short Term Goals: (4 weeks) Current Progress:   Patient will successfully participate in Fiberoptic Endoscopic Evaluation of Swallow to visualize oropharyngeal swallow function, rule out aspiration, and determine least restrictive diet.     GOAL MET 1/10/2023      Patient will independently utilize the swallow strategy of oral preparatory set to consume liquids and solids 60-75 times per session.     Progressing/ Not Met 1/19/2024   Not addressed in today's session.      Met x2   3. Patient will decrease laryngeal and thyrohyoid space tension utilizing circumlaryngeal massage 2-5x/day and/or prior to oral intake with digital pressure.     Progressing/ Not Met 1/19/2024   Diaphragmatic breathing exercises have been carried over to implementation at home- circumlaryngeal massages are  currently not deemed functional at this moment.   4. Patient will independently demonstrate adequate use of effortful swallow technique to improve overall swallow strength, safety and efficiency 60-75x per session.       Progressing/ Not Met 1/19/2024   Patient completed 60 effortful swallows during thin liquid PO trials beginning with minimal verbal and visual instruction, transitioning to independent production towards end of session.     No overt s/s of aspiration noted.    Met x3   5. Patient will demonstrate the ability to adequately self-monitor swallowing skills and perform appropriate compensatory techniques to reduce s/s of aspiration     Progressing/ Not Met 1/19/2024   Patient required min verbal cues to ensure adequate self-monitoring of swallow skills while completing swallow strategies and exercises.    6. Patient will participate in dysphagia education including clinical signs of aspiration with returned demonstration of information.     Progressing/ Not Met 1/19/2024   Goal Met 1/19/2024            Patient Education/Response:   Patient educated regarding the following:  Mindfulness exercises- diaphragmatic breathing  2.  Strategies to reduce swallow anxiety when eating and/or drinking  Patient and  both expressed understanding-they were given handouts of all education addressed above.    Home program established: Program modified based on patient progress  Patient verbalized understanding to all above education provided.     See Electronic Medical Record under Patient Instructions for exercises provided throughout therapy.  Assessment:   yAla actively participated well in today's session which focused on dysphagia exercises, dysphagia strategies, and education. Improvements continue to be noted in maintaining adequate nutrition and hydration, with patient reporting no weight loss since initial evaluation. She is gradually progressing towards minced and moist (IDDSI Level 5) diet with  trialing foods at home. Difficulties remain in anxiety with swallowing solid, easy to chew, or soft bite sized consistencies. Cognitive, Physical, and Emotional fatigue was not believed to have been a barrier to the session. To date, Ayla has met 2 goals.     Ayla is progressing well towards her goals. Current goals remain appropriate. Goals to be updated as necessary.     Patient prognosis is Good. Patient will continue to benefit from skilled outpatient speech and language therapy to address the deficits listed in the problem list on initial evaluation, provide patient/family education and to maximize patient's level of independence in the home and community environment.   Medical necessity is demonstrated by the following IMPAIRMENTS:  Dysphagia: Decreased safety and efficiency of swallowing function on PO intake without overt s/s of aspiration for the highest diet level.   Barriers to Therapy: No barriers to therapy identified  Patient's spiritual, cultural and educational needs considered and patient agreeable to plan of care and goals.  Plan:   Continue Plan of Care with focus on rehabilitation and compensation for dysphagia. Follow-up with EGD results after 1/25/2024 appointment.    Danny De La Torre M.S., CF-SLP  Speech-Language Pathologist Resident  1/19/2024

## 2024-01-22 ENCOUNTER — CLINICAL SUPPORT (OUTPATIENT)
Dept: REHABILITATION | Facility: HOSPITAL | Age: 79
End: 2024-01-22
Payer: MEDICARE

## 2024-01-22 DIAGNOSIS — R13.10 DYSPHAGIA, UNSPECIFIED TYPE: Primary | ICD-10-CM

## 2024-01-22 PROCEDURE — 92526 ORAL FUNCTION THERAPY: CPT | Mod: PO

## 2024-01-22 NOTE — PROGRESS NOTES
[facility]  Speech Therapy Treatment Note- Dysphagia  Date: 1/22/2024     Name: Ayla Dela Cruz   MRN: [id number]   Therapy Diagnosis:   Encounter Diagnosis   Name Primary?    Dysphagia, unspecified type Yes     Physician: Xavier Trejo,*  Physician Orders: Ambulatory Referral to Speech Therapy   Medical Diagnosis: TIA (transient ischemic attack) [G45.9]     Visit #/ Visits Authorized: 6/ 20 (1 previous)  Date of Evaluation:  12/22/2023  Insurance Authorization Period: 12/29/2023 to 12/31/2023  Plan of Care Expiration Date:    2/16/2023  Extended Plan of Care:  N/A   Progress Note: 1/25/2024     Time In:  1345  Time Out:  1430  Total Billable Time: 45     Precautions: Standard, Dysphagia, and Cardiac  Subjective:   Patient reports: Things have been going better. Started on a new medication for anxiety and it should increase her appetite. EGD scheduled on 1/25/2024. Chili, yogurt and crackers, spaghetti and meat sauce, 2 yogurts and crackers, tomato soup and crackers,  Still avoiding: chicken, bread  Would like to start: lunch meat  She was compliant to home exercise program.  Response to previous treatment: good  Pain Scale: no pain indicated throughout session  Objective:   UNTIMED  Procedure   Dysphagia Therapy        Short Term Goals: (4 weeks) Current Progress:   Patient will successfully participate in Fiberoptic Endoscopic Evaluation of Swallow to visualize oropharyngeal swallow function, rule out aspiration, and determine least restrictive diet. GOAL MET 1/10/2023      Patient will independently utilize the swallow strategy of oral preparatory set to consume liquids and solids 60-75 times per session.     Progressing/ Not Met 1/22/2024   Not addressed in today's session.      Met x2   3. Patient will decrease laryngeal and thyrohyoid space tension utilizing circumlaryngeal massage 2-5x/day and/or prior to oral intake with digital pressure.     Progressing/ Not Met 1/22/2024    Diaphragmatic breathing exercises have been carried over to implementation at home- circumlaryngeal massages are currently not deemed functional at this moment.   4. Patient will independently demonstrate adequate use of effortful swallow technique to improve overall swallow strength, safety and efficiency 60-75x per session.       Progressing/ Not Met 1/22/2024   Patient completed 20 effortful swallows during thin liquid PO trials independently     No overt s/s of aspiration noted.    One instance of globus sensation     Reported she had just ate and was not hungry    Met x3   5. Patient will demonstrate the ability to adequately self-monitor swallowing skills and perform appropriate compensatory techniques to reduce s/s of aspiration     Progressing/ Not Met 1/22/2024   Patient required min verbal cues to ensure adequate self-monitoring of swallow skills while completing swallow strategies and exercises.    6. Patient will participate in dysphagia education including clinical signs of aspiration with returned demonstration of information.  Goal Met 1/22/2024            Patient Education/Response:   Patient educated regarding the following:  Mindfulness exercises- diaphragmatic breathing  2.  Strategies to reduce swallow anxiety when eating and/or drinking  3. Ways to progress textures  4. Education regarding swallow mechanism   5. Education regarding the benefits of a registered dietitian referral   Patient and  both expressed understanding-they were given handouts of all education addressed above.    Home program established: Program modified based on patient progress  Patient verbalized understanding to all above education provided.     See Electronic Medical Record under Patient Instructions for exercises provided throughout therapy.  Assessment:   Ayla actively participated well in today's session which focused on dysphagia exercises, dysphagia strategies, and education. Improvements continue to be  noted in maintaining adequate nutrition and hydration, with patient reporting no weight loss since initial evaluation. She is gradually progressing towards minced and moist (IDDSI Level 5) diet with trialing foods at home. Patient arrived after lunch today and stated she was too full to eat more than a couple bites of cracker. She did handle the cracker with minimal globus sensation. To date, Ayla has met 2 goals.     Ayla is progressing well towards her goals. Current goals remain appropriate. Goals to be updated as necessary.     Patient prognosis is Good. Patient will continue to benefit from skilled outpatient speech and language therapy to address the deficits listed in the problem list on initial evaluation, provide patient/family education and to maximize patient's level of independence in the home and community environment.   Medical necessity is demonstrated by the following IMPAIRMENTS:  Dysphagia: Decreased safety and efficiency of swallowing function on PO intake without overt s/s of aspiration for the highest diet level.   Barriers to Therapy: No barriers to therapy identified  Patient's spiritual, cultural and educational needs considered and patient agreeable to plan of care and goals.  Plan:   Continue Plan of Care with focus on rehabilitation and compensation for dysphagia. Follow-up with EGD results after 1/25/2024 appointment.    Pallavi Blair CCC-SLP   1/22/2024

## 2024-01-24 ENCOUNTER — CLINICAL SUPPORT (OUTPATIENT)
Dept: REHABILITATION | Facility: HOSPITAL | Age: 79
End: 2024-01-24
Payer: MEDICARE

## 2024-01-24 ENCOUNTER — PATIENT MESSAGE (OUTPATIENT)
Dept: PSYCHIATRY | Facility: CLINIC | Age: 79
End: 2024-01-24
Payer: MEDICARE

## 2024-01-24 DIAGNOSIS — R13.10 DYSPHAGIA, UNSPECIFIED TYPE: Primary | ICD-10-CM

## 2024-01-24 PROCEDURE — 92526 ORAL FUNCTION THERAPY: CPT | Mod: PO

## 2024-01-24 NOTE — ED PROVIDER NOTES
Encounter Date: 10/25/2023       History     Chief Complaint   Patient presents with    Shortness of Breath     With nasal congestion. Intermittent for the past week -- worse this AM     Patient is a 78 y.o. female who presents to the ED 10/25/2023 with a chief complaint of left-sided facial numbness and neck numbness for 2 weeks.  Patient has had associated nasal congestion and discomfort and an abnormal feeling with swallowing.  She denies any throat pain but feels like it feels numb on 1 side of her throat.  She did have some mucus and coughing but that seems to resolve with Protonix.  She is not had any fever.  She denies any extremity numbness or weakness.  She denies any double vision or blurred vision.  She walks without assistance at baseline and has been able to do so.  Her caregiver note she is been more short of breath than usual as well and her oxygen saturation is dropping into the 80s at home.  She takes deep breath and states that the oxygen saturation comes up and she feels better.  She states she is not having any shortness of breath or chest pain at this time.  She states she has a history of stroke and has no deficits but did have left-sided symptoms at that time.  She does take Eliquis and a baby aspirin daily.  She is a history of AFib and an indwelling pacemaker.  She also has a history of hypertension, CHF, DVT, stroke, TIA, hyperlipidemia, and skin CA.              Review of patient's allergies indicates:   Allergen Reactions    Gabapentin Hallucinations    Xarelto [rivaroxaban] Rash    Bactrim [sulfamethoxazole-trimethoprim] Other (See Comments)     Upset stomach, dry heaves, confusion    Afrin (pseudoephedrine)     Amoxicillin-pot clavulanate      Other reaction(s): Mental Status Change    Atorvastatin      Other reaction(s): Joint pain    Baclofen     Baclofen (bulk) Nausea And Vomiting    Ciprofloxacin     Decongest tabs      Other reaction(s): increased heart rate    Decongestant  Onset: tonight    Location / description: Stools are starting to come out clear. Patient has about 1/4 of the bottle left. Patient states he just mixed it with water. He does have some crystal light at home that he will drink now. He reports feeling tired and cold too.    Precipitating Factors: EGD/Colonoscopy tomorrow    Pain Scale (1-10), 10 highest: Not addressed    What  improves / worsens symptoms: trying to drink the colon prep    Symptom specific medications: Golytely prep    Recent visits (last 3-4 weeks) for same reason or recent surgery:  EGD/Colonoscopy tomorrow.    PLAN:  Home Care Advice provided Patient will drink crystal light now and take a break for 30 minutes. He will continue the prep at that time and try to finish it. He will call back with any further questions or concerns.    Patient/Caller agrees to follow recommendations.  Reason for Disposition   Bowel prep for colonoscopy, questions about    Protocols used: Colonoscopy Symptoms and [id number]     [pseudoephedrine hcl]     Erythromycin Other (See Comments)    Flecainide Hives     And SOB. No reaction to Lidocaine     Fluoxetine      Other reaction(s): heart palpitations  Other reaction(s): anxiety    Lisinopril Other (See Comments)     cough    Losartan Other (See Comments)     Hypotension with lightheadedness    Morphine Other (See Comments)     confusion    Tramadol Other (See Comments)     SOB, low BP    Venlafaxine     Venlafaxine analogues      Changes in BP and increases heart rate       Afrin [oxymetazoline] Palpitations    Caffeine Palpitations    Dabigatran etexilate Rash    Tizanidine Anxiety     dizziness     Past Medical History:   Diagnosis Date    Anticoagulant long-term use     Anxiety     Arthritis     Atrial fibrillation     Basal cell carcinoma     Cancer     skin    CHF (congestive heart failure)     Depression     DVT (deep venous thrombosis)     GERD (gastroesophageal reflux disease)     Glaucoma (increased eye pressure)     Hyperlipidemia     diet controlled    Hypertension     Interstitial lung disease     Localized hives 01/10/2020    Mild persistent asthma without complication 11/12/2018    Pacemaker     Pneumonia 01/31/2014    Stroke 06/03/2014    Stroke     TIA (transient ischemic attack)     TIA (transient ischemic attack)      Past Surgical History:   Procedure Laterality Date    2 heart ablations      3 in total    A-V CARDIAC PACEMAKER INSERTION Left 10/14/2021    Procedure: INSERTION, CARDIAC PACEMAKER, DUAL CHAMBER;  Surgeon: Fco Calderon MD;  Location: [facility] EP LAB;  Service: Cardiology;  Laterality: Left;  PAF/ [place] Arrthymias, Dual PPM, SJM, MAC, GP, 3 PREP    ANTERIOR VITRECTOMY Right 7/27/2022    Procedure: VITRECTOMY, ANTERIOR APPROACH;  Surgeon: Daniel Tan MD;  Location: [facility] OR;  Service: Ophthalmology;  Laterality: Right;    bilateral cataracts      CHOLECYSTECTOMY      COLONOSCOPY N/A 1/19/2017    Procedure: COLONOSCOPY and EGD;  Surgeon: Jonathan Schultz MD;   Location: [facility] ENDO;  Service: Endoscopy;  Laterality: N/A;    COLONOSCOPY N/A 10/10/2019    Procedure: COLONOSCOPY;  Surgeon: Alex Christian MD;  Location: [facility] ENDO;  Service: Endoscopy;  Laterality: N/A;    ESOPHAGOGASTRODUODENOSCOPY N/A 10/14/2019    Procedure: EGD (ESOPHAGOGASTRODUODENOSCOPY);  Surgeon: Alex Christian MD;  Location: [facility] ENDO;  Service: Endoscopy;  Laterality: N/A;    INJECTION OF ANESTHETIC AGENT AROUND MEDIAL BRANCH NERVES INNERVATING CERVICAL FACET JOINT Right 6/7/2018    Procedure: BLOCK, NERVE, FACET JOINT, MEDIAL BRANCH, CERVICAL;  Surgeon: Galo Clancy MD;  Location: [facility] OR;  Service: Pain Management;  Laterality: Right;  C4, 5, 6    OPEN REDUCTION AND INTERNAL FIXATION (ORIF) OF INJURY OF ANKLE Right 11/1/2018    Procedure: ORIF, ANKLE;  Surgeon: Wilfredo Aguero MD;  Location: [facility] OR;  Service: Orthopedics;  Laterality: Right;    PARACENTESIS, EYE, ANTERIOR CHAMBER, WITH AQUEOUS REMOVAL Right 7/27/2022    Procedure: Anterior chamber washout, possible IOL removal;  Surgeon: Daniel Tan MD;  Location: [facility] OR;  Service: Ophthalmology;  Laterality: Right;    pyloristenosis      RADIOFREQUENCY ABLATION OF LUMBAR MEDIAL BRANCH NERVE AT SINGLE LEVEL Bilateral 9/21/2018    Procedure: RADIOFREQUENCY ABLATION, NERVE, SPINAL, LUMBAR, MEDIAL BRANCH, 1 LEVEL;  Surgeon: Galo Clancy MD;  Location: [facility] OR;  Service: Pain Management;  Laterality: Bilateral;  L3, 4, 5    RADIOFREQUENCY ABLATION OF LUMBAR MEDIAL BRANCH NERVE AT SINGLE LEVEL Bilateral 2/19/2019    Procedure: Radiofrequency Ablation, Nerve, Spinal, Lumbar, Medial Branch, 1 Level;  Surgeon: Galo Clancy MD;  Location: [facility] OR;  Service: Pain Management;  Laterality: Bilateral;  L3, 4, 5     RADIOFREQUENCY ABLATION OF LUMBAR MEDIAL BRANCH NERVE AT SINGLE LEVEL Bilateral 3/10/2020    Procedure: Radiofrequency Ablation, Nerve, Spinal, Lumbar, Medial Branch, 1 Level;  Surgeon: Galo Clancy MD;  Location: [facility] OR;  Service: Pain  Management;  Laterality: Bilateral;  L3,4,5 - Burned at 80 degrees C. for 60 seconds x 2 each site      RADIOFREQUENCY ABLATION OF LUMBAR MEDIAL BRANCH NERVE AT SINGLE LEVEL Bilateral 9/11/2020    Procedure: Radiofrequency Ablation, Nerve, Spinal, Lumbar, Medial Branch, 1 Level;  Surgeon: Galo Clancy MD;  Location: [facility] OR;  Service: Pain Management;  Laterality: Bilateral;  L3, 4, 5 - Burned at 80 degrees C. for 60 seconds x 2 each site    RADIOFREQUENCY ABLATION OF LUMBAR MEDIAL BRANCH NERVE AT SINGLE LEVEL Bilateral 5/28/2021    Procedure: Radiofrequency Ablation, Nerve, Spinal, Lumbar, Medial Branch, 1 Level;  Surgeon: Galo Clancy MD;  Location: [facility] OR;  Service: Pain Management;  Laterality: Bilateral;  L3,4,5    RADIOFREQUENCY THERMAL COAGULATION OF MEDIAL BRANCH OF POSTERIOR RAMUS OF CERVICAL SPINAL NERVE Right 7/3/2018    Procedure: RADIOFREQUENCY THERMAL COAGULATION, NERVE, SPINAL, CERVICAL, MEDIAL BRANCH OF POSTERIOR RAMUS;  Surgeon: Galo Clancy MD;  Location: [facility] OR;  Service: Pain Management;  Laterality: Right;  C4,5,6 - Burned at 80 degrees C. for 75 seconds each site    RADIOFREQUENCY THERMAL COAGULATION OF MEDIAL BRANCH OF POSTERIOR RAMUS OF CERVICAL SPINAL NERVE Right 7/23/2019    Procedure: RADIOFREQUENCY THERMAL COAGULATION, NERVE, SPINAL, CERVICAL, POSTERIOR RAMUS, MEDIAL BRANCH;  Surgeon: Galo Clancy MD;  Location: [facility] OR;  Service: Pain Management;  Laterality: Right;  C4,5,6    RADIOFREQUENCY THERMAL COAGULATION OF MEDIAL BRANCH OF POSTERIOR RAMUS OF CERVICAL SPINAL NERVE Right 6/23/2020    Procedure: RADIOFREQUENCY THERMAL COAGULATION, NERVE, SPINAL, CERVICAL, POSTERIOR RAMUS, MEDIAL BRANCH;  Surgeon: Galo Clancy MD;  Location: [facility] OR;  Service: Pain Management;  Laterality: Right;  C4, 5, 6    RADIOFREQUENCY THERMOCOAGULATION Bilateral 9/10/2019    Procedure: RADIOFREQUENCY THERMAL COAGULATION LUMBAR;  Surgeon: Galo Clancy MD;  Location: [facility] OR;  Service: Pain Management;  Laterality:  Bilateral;  L3,4,5 - Burned at 80 degrees C. for 60 seconds x 2 each site    skin cancer removal       TONSILLECTOMY       Family History   Problem Relation Age of Onset    Arthritis Mother     Asthma Mother     Rheum arthritis Mother     Pneumonia Mother     Skin cancer Mother         unsure of type    Heart disease Father     Ulcers Father     Alzheimer's disease Maternal Uncle     Rheum arthritis Maternal Grandmother     Emphysema Maternal Grandfather     Cancer Maternal Grandfather         kidney    Kidney disease Maternal Grandfather     Cancer Paternal Grandmother         lung= smoker    Pneumonia Paternal Grandfather     Depression Son     Breast cancer Neg Hx     Ovarian cancer Neg Hx      Social History     Tobacco Use    Smoking status: Never    Smokeless tobacco: Never   Substance Use Topics    Alcohol use: No    Drug use: No     Review of Systems   Constitutional:  Negative for chills and fever.   HENT:  Positive for congestion. Negative for sore throat.    Respiratory:  Positive for shortness of breath. Negative for cough and chest tightness.    Cardiovascular:  Positive for palpitations. Negative for chest pain and leg swelling.   Gastrointestinal:  Negative for abdominal pain, diarrhea, nausea and vomiting.   Genitourinary:  Negative for dysuria.   Musculoskeletal:  Negative for arthralgias and myalgias.   Skin:  Negative for rash and wound.   Neurological:  Positive for numbness. Negative for dizziness, tremors, seizures, syncope, facial asymmetry, speech difficulty, weakness, light-headedness and headaches.   Hematological:  Does not bruise/bleed easily.       Physical Exam     Initial Vitals [10/25/23 0822]   BP Pulse Resp Temp SpO2   134/66 83 18 98.4 °F (36.9 °C) 100 %      MAP       --         Physical Exam    Nursing note and vitals reviewed.  Constitutional: Vital signs are normal.   Elderly and frail appearing.    HENT:   Head: Normocephalic and atraumatic.   Eyes: Pupils are equal, round,  and reactive to light.   Neck: Neck supple.   Cardiovascular:  Normal rate, normal heart sounds and intact distal pulses. An irregularly irregular rhythm present.     Exam reveals no gallop and no friction rub.       No murmur heard.  Pulmonary/Chest: Breath sounds normal. She has no wheezes. She has no rhonchi. She has no rales.   Abdominal: Abdomen is soft. Bowel sounds are normal. There is no abdominal tenderness.   Musculoskeletal:      Cervical back: Neck supple.     Neurological: She is alert and oriented to person, place, and time. She has normal strength. GCS eye subscore is 4. GCS verbal subscore is 5. GCS motor subscore is 6.   No focal neurological deficits noted. Cranial nerves III-XII grossly intact with the exception of pupils irregular bi and decreased sensation to light touch to the left midface and neck. 5/5 strength and normal sensation to bi upper and lower extremities. No pronator drift. Gait normal.      Skin: Skin is warm, dry and intact.   Psychiatric: She has a normal mood and affect. Her speech is normal and behavior is normal.         ED Course   Procedures  Labs Reviewed   CBC W/ AUTO DIFFERENTIAL - Abnormal; Notable for the following components:       Result Value    MCH 31.3 (*)     MPV 9.1 (*)     Gran # (ANC) 8.6 (*)     Immature Grans (Abs) 0.06 (*)     Gran % 77.6 (*)     Lymph % 10.7 (*)     All other components within normal limits   COMPREHENSIVE METABOLIC PANEL - Abnormal; Notable for the following components:    Total Bilirubin 1.1 (*)     All other components within normal limits   PROTIME-INR   TSH   SARS-COV-2 RNA AMPLIFICATION, QUAL   B-TYPE NATRIURETIC PEPTIDE   TROPONIN I   D DIMER, QUANTITATIVE     EKG Readings: (Independently Interpreted)   Initial Reading: No STEMI. Rhythm: Atrial Fibrillation. Heart Rate: 65. ST Segments: Normal ST Segments. T Waves: Normal. Other Findings: Prolonged QT Interval. Other Impression: QTc 501       Imaging Results              CT Head  Without Contrast (Final result)  Result time 10/25/23 11:10:01      Final result by Johnny Jean MD (10/25/23 11:10:01)                   Impression:      1. There is no acute intracranial abnormality.  There is nonspecific white matter change but there is no hemorrhage, mass or obvious acute infarction.  It should be noted that MRI is more sensitive in the detection of subtle or acute nonhemorrhagic ischemic disease.  2. Right sphenoid sinusitis.      Electronically signed by: Johnny Jean MD  Date:    10/25/2023  Time:    11:10               Narrative:    EXAMINATION:  CT HEAD WITHOUT CONTRAST    CLINICAL HISTORY:  Neuro deficit, acute, stroke suspected;    TECHNIQUE:  Low dose axial images were obtained through the head.  Coronal and sagittal reformations were also performed. Contrast was not administered.    COMPARISON:  CT head dated 11/15/2021    FINDINGS:  Intracranial contents: There is mild generalized volume loss.  There is mild nonspecific white matter change.  There is no intracranial hemorrhage or mass.  The gray-white interface is preserved without acute infarction.  There is no definite change compared to the prior study.  There is no abnormal extra-axial fluid collection.  The basilar cisterns are open.  The cerebellar tonsils remain in normal position.  There is no dense vessel.    Calvarium, extracranial contents: There is mild scattered mucosal thickening in the ethmoid air cells. There is moderate to marked mucosal thickening in the right sphenoid sinus. There are changes of hyperostosis frontalis interna. There is no skull fracture. There is a left-sided torin bullosa.  The nasal septum is deviated to the right. The mastoid air cells are clear.                                       X-Ray Chest PA And Lateral (Final result)  Result time 10/25/23 10:32:13      Final result by Jonathan Mckeon Jr., MD (10/25/23 10:32:13)                   Impression:      Pacemaker in place  otherwise negative chest x-ray.      Electronically signed by: Jonathan Mckeon MD  Date:    10/25/2023  Time:    10:32               Narrative:    EXAMINATION:  XR CHEST PA AND LATERAL    CLINICAL HISTORY:  Shortness of breath    TECHNIQUE:  PA and lateral views of the chest were performed.    COMPARISON:  Chest x-ray of November 15, 2021    FINDINGS:  A pacemaker is noted in place on the left with 2 leads to the right heart.  The mediastinal and cardiac size and contour are normal.  No intrapulmonary mass or infiltrate is seen.  No pneumothorax or pleural effusion is noted.                                       Medications   apixaban tablet 5 mg (has no administration in time range)   aspirin chewable tablet 81 mg (has no administration in time range)   busPIRone tablet 10 mg (10 mg Oral Given 10/25/23 1535)   dorzolamide-timolol 2-0.5% ophthalmic solution 1 drop (1 drop Both Eyes Given 10/25/23 1535)   LORazepam tablet 0.5 mg (has no administration in time range)   metoprolol tartrate (LOPRESSOR) tablet 25 mg (has no administration in time range)   pantoprazole EC tablet 40 mg (40 mg Oral Given 10/25/23 1535)   sodium chloride 0.9% flush 10 mL (has no administration in time range)   sodium chloride 0.9% bolus 500 mL 500 mL (has no administration in time range)   labetaloL injection 10 mg (has no administration in time range)   0.9%  NaCl infusion ( Intravenous New Bag 10/25/23 1538)   ondansetron injection 4 mg (has no administration in time range)   iohexoL (OMNIPAQUE 350) 350 mg iodine/mL injection (75 mLs Intravenous Given 10/25/23 9869)     Medical Decision Making  Amount and/or Complexity of Data Reviewed  Labs: ordered.  Radiology: ordered.      Additional MDM:     NIH Stroke Scale:   Interval = baseline (upon arrival/admit)  Level of consciousness = 0 - alert  LOC questions = 0 - answers both correctly  LOC commands = 0 - performs both correctly  Best gaze = 0 - normal  Visual = 0 - no visual loss  Facial  palsy = 0 - normal  Motor left arm =  0 - no drift  Motor right arm =  0 - no drift  Motor left leg = 0 - no drift  Motor right leg =  0 - no drift  Limb ataxia = 0 - absent  Sensory = 1 - mild to moderate loss  Best language = 0 - no aphasia  Dysarthria = 0 - normal articulation  Extinction and inattention = 0 - no neglect  NIH Stroke Scale Total = 1        APC / Resident Notes:   Patient is a 78 y.o. female who presents to the ED 10/25/2023 who Underwent emergent evaluation for numbness to the left side of her face for 2 weeks with some dysphagia as well as progressive dyspnea with activity. Pupils irregular at baseline and unchanged. Pt has decreased sensation to light touch on the right side of her face. Normal phonation. Not drooling and tolerating secretions. No other focal neurological deficits. Pt ambulatory. Gait normal. No pronator drift. CT without evidence of ICH or SDH or other acute process. Given history of stroke with new numbness and dysphagia will admit for further stroke rule out. Pt agreeable to this. She is not a TKA candidate due to current anticoagulation use and NIH score 1. I do not think LVO. Pt has had these symptoms for 2 weeks and I believe would benefit from further evaluation inpatient to continue to rule out stroke.   Pt also c/o dyspnea with exertion. Pt has no tachypnea or hypoxia and is in no acute respiratory distress and is not wheezing.  Chest x-ray without evidence of pneumonia or pneumothorax.  No evidence of congestive heart failure.  BNP normal.  D-dimer also normal.  I do not think PE.  EKG shows AFib which is chronic for patient with normal rate.  No evidence of ischemia and troponin normal and patient not having any chest pain and I do not think ACS. Labs unremarkable. No severe anemia.  Currently no sign of acute infectious process requiring antibiotics at this time.  Case discussed with Dr. Deal who is accepting of this admission for further stroke rule out.  Patient  agreeable to this plan care. Case discussed with Dr. Villar who is also agreeable.                    Medical Decision Making:   Differential Diagnosis:   Sinusitis  Stroke  PE      Clinical Impression:   Final diagnoses:  [I63.9] Stroke - rule out  [R06.02] SOB (shortness of breath)  [G45.9] TIA (transient ischemic attack)        ED Disposition Condition    Observation Stable                Isabella Rapp NP  10/25/23 [id number]

## 2024-01-24 NOTE — PROGRESS NOTES
[facility]  Speech Therapy Treatment Note- Dysphagia  Date: 1/24/2024     Name: Ayla Dela Cruz   MRN: [id number]   Therapy Diagnosis:   Encounter Diagnosis   Name Primary?    Dysphagia, unspecified type Yes   Physician: Xavier Trejo,*  Physician Orders: Ambulatory Referral to Speech Therapy   Medical Diagnosis: TIA (transient ischemic attack) [G45.9]     Visit #/ Visits Authorized: 7/ 20 (1 previous)  Date of Evaluation:  12/22/2023  Insurance Authorization Period: 12/29/2023 to 12/31/2023  Plan of Care Expiration Date:    2/16/2023  Extended Plan of Care:  N/A   Progress Note: 1/25/2024     Time In:  1530  Time Out:  1605  Total Billable Time: 35     Precautions: Standard, Dysphagia, and Cardiac  Subjective:   Patient reports: Has eaten spaghetti, they bought bread, peanut butter, continues to increase calories with increasing amount of food she is eating with her safe foods, Last night she had cherry and peach cobbler   Will be having multigrain bread sandwich for dinner tonight   Still avoiding: chicken, bread  Would like to start: lunch meat  She was compliant to home exercise program.  Response to previous treatment: good  Pain Scale: no pain indicated throughout session  Objective:   UNTIMED  Procedure   Dysphagia Therapy     Short Term Goals: (4 weeks) Current Progress:   Patient will successfully participate in Fiberoptic Endoscopic Evaluation of Swallow to visualize oropharyngeal swallow function, rule out aspiration, and determine least restrictive diet. GOAL MET 1/10/2023      Patient will independently utilize the swallow strategy of oral preparatory set to consume liquids and solids 60-75 times per session.     Progressing/ Not Met 1/24/2024   Not addressed in today's session.      Met x2   3. Patient will decrease laryngeal and thyrohyoid space tension utilizing circumlaryngeal massage 2-5x/day and/or prior to oral intake with digital pressure.     Progressing/ Not Met  1/24/2024   Diaphragmatic breathing exercises have been carried over to implementation as needed within the session    Pursed lip breathing discussed for rescue if needed    4. Patient will independently demonstrate adequate use of effortful swallow technique to improve overall swallow strength, safety and efficiency 60-75x per session.       Progressing/ Not Met 1/24/2024   Patient completed 27 effortful swallows during thin liquid and piper doone cookies (regular) trials independently     No overt s/s of aspiration noted.    One instance of globus sensation     Used mindfulness as needed for decreasing anxiety to continue with regular textures      Coaching for functional dysphagia related to anxiety needed throught     Met x3   5. Patient will demonstrate the ability to adequately self-monitor swallowing skills and perform appropriate compensatory techniques to reduce s/s of aspiration     Progressing/ Not Met 1/24/2024   Patient required min verbal cues to ensure adequate self-monitoring of swallow skills while completing swallow strategies and exercises.    6. Patient will participate in dysphagia education including clinical signs of aspiration with returned demonstration of information.  Goal Met 1/22/2024        Patient Education/Response:   Patient educated regarding the following: (continued)  Mindfulness exercises- diaphragmatic breathing  2.  Strategies to reduce swallow anxiety when eating and/or drinking    Home program established: Program modified based on patient progress  Patient verbalized understanding to all above education provided.     See Electronic Medical Record under Patient Instructions for exercises provided throughout therapy.  Assessment:   Ayla actively participated well in today's session which focused on dysphagia exercises, dysphagia strategies, and education. Improvements continue to be noted in maintaining adequate nutrition and hydration, with patient reporting no weight loss  since initial evaluation. Significant anxiety with use of regular textures today. Able to use mindfulness to continue within the session this date with coaching. To date, Ayla has met 2 goals.     Ayla is progressing well towards her goals. Current goals remain appropriate. Goals to be updated as necessary.     Patient prognosis is Good. Patient will continue to benefit from skilled outpatient speech and language therapy to address the deficits listed in the problem list on initial evaluation, provide patient/family education and to maximize patient's level of independence in the home and community environment.   Medical necessity is demonstrated by the following IMPAIRMENTS:  Dysphagia: Decreased safety and efficiency of swallowing function on PO intake without overt s/s of aspiration for the highest diet level.   Barriers to Therapy: No barriers to therapy identified  Patient's spiritual, cultural and educational needs considered and patient agreeable to plan of care and goals.  Plan:   Continue Plan of Care with focus on rehabilitation and compensation for dysphagia. Follow-up with EGD results after 1/25/2024 appointment.    Pallavi Blair CCC-SLP   1/24/2024

## 2024-01-25 ENCOUNTER — HOSPITAL ENCOUNTER (OUTPATIENT)
Facility: HOSPITAL | Age: 79
Discharge: HOME | End: 2024-01-25
Attending: INTERNAL MEDICINE | Admitting: INTERNAL MEDICINE
Payer: MEDICARE

## 2024-01-25 ENCOUNTER — ANESTHESIA (OUTPATIENT)
Dept: ENDOSCOPY | Facility: HOSPITAL | Age: 79
End: 2024-01-25
Payer: MEDICARE

## 2024-01-25 ENCOUNTER — ANESTHESIA EVENT (OUTPATIENT)
Dept: ENDOSCOPY | Facility: HOSPITAL | Age: 79
End: 2024-01-25
Payer: MEDICARE

## 2024-01-25 DIAGNOSIS — K22.2 SCHATZKI'S RING: Primary | ICD-10-CM

## 2024-01-25 DIAGNOSIS — R13.10 DYSPHAGIA: ICD-10-CM

## 2024-01-25 PROCEDURE — C1769 GUIDE WIRE: HCPCS | Performed by: INTERNAL MEDICINE

## 2024-01-25 PROCEDURE — 43239 EGD BIOPSY SINGLE/MULTIPLE: CPT | Mod: 59,,, | Performed by: INTERNAL MEDICINE

## 2024-01-25 PROCEDURE — D9220A PRA ANESTHESIA: Mod: ANES,,, | Performed by: ANESTHESIOLOGY

## 2024-01-25 PROCEDURE — 25000003 PHARM REV CODE 250: Performed by: INTERNAL MEDICINE

## 2024-01-25 PROCEDURE — 37000009 HC ANESTHESIA EA ADD 15 MINS: Performed by: INTERNAL MEDICINE

## 2024-01-25 PROCEDURE — 43239 EGD BIOPSY SINGLE/MULTIPLE: CPT | Mod: 59 | Performed by: INTERNAL MEDICINE

## 2024-01-25 PROCEDURE — 37000008 HC ANESTHESIA 1ST 15 MINUTES: Performed by: INTERNAL MEDICINE

## 2024-01-25 PROCEDURE — 43248 EGD GUIDE WIRE INSERTION: CPT | Performed by: INTERNAL MEDICINE

## 2024-01-25 PROCEDURE — 88305 TISSUE EXAM BY PATHOLOGIST: CPT | Mod: TC | Performed by: PATHOLOGY

## 2024-01-25 PROCEDURE — 63600175 PHARM REV CODE 636 W HCPCS: Performed by: NURSE ANESTHETIST, CERTIFIED REGISTERED

## 2024-01-25 PROCEDURE — D9220A PRA ANESTHESIA: Mod: CRNA,,, | Performed by: NURSE ANESTHETIST, CERTIFIED REGISTERED

## 2024-01-25 PROCEDURE — 27201012 HC FORCEPS, HOT/COLD, DISP: Performed by: INTERNAL MEDICINE

## 2024-01-25 PROCEDURE — 43248 EGD GUIDE WIRE INSERTION: CPT | Mod: ,,, | Performed by: INTERNAL MEDICINE

## 2024-01-25 PROCEDURE — 25000003 PHARM REV CODE 250: Performed by: NURSE ANESTHETIST, CERTIFIED REGISTERED

## 2024-01-25 RX ORDER — PROPOFOL 10 MG/ML
INJECTION, EMULSION INTRAVENOUS
Status: DISCONTINUED | OUTPATIENT
Start: 2024-01-25 | End: 2024-01-25

## 2024-01-25 RX ORDER — LIDOCAINE HYDROCHLORIDE 10 MG/ML
INJECTION, SOLUTION EPIDURAL; INFILTRATION; INTRACAUDAL; PERINEURAL
Status: COMPLETED
Start: 2024-01-25 | End: 2024-01-25

## 2024-01-25 RX ORDER — SODIUM CHLORIDE 9 MG/ML
INJECTION, SOLUTION INTRAVENOUS CONTINUOUS
Status: DISCONTINUED | OUTPATIENT
Start: 2024-01-25 | End: 2024-01-25 | Stop reason: HOSPADM

## 2024-01-25 RX ORDER — LIDOCAINE HYDROCHLORIDE 10 MG/ML
INJECTION, SOLUTION EPIDURAL; INFILTRATION; INTRACAUDAL; PERINEURAL
Status: DISCONTINUED | OUTPATIENT
Start: 2024-01-25 | End: 2024-01-25

## 2024-01-25 RX ADMIN — PROPOFOL 50 MG: 10 INJECTION, EMULSION INTRAVENOUS at 02:01

## 2024-01-25 RX ADMIN — PROPOFOL 25 MG: 10 INJECTION, EMULSION INTRAVENOUS at 02:01

## 2024-01-25 RX ADMIN — LIDOCAINE HYDROCHLORIDE 50 MG: 10 INJECTION, SOLUTION EPIDURAL; INFILTRATION; INTRACAUDAL; PERINEURAL at 02:01

## 2024-01-25 RX ADMIN — SODIUM CHLORIDE: 9 INJECTION, SOLUTION INTRAVENOUS at 12:01

## 2024-01-25 NOTE — ANESTHESIA PREPROCEDURE EVALUATION
01/25/2024  Ayla Dela Cruz is a 78 y.o., female.      Pre-op Assessment    I have reviewed the NPO Status.   I have reviewed the Medications.     Review of Systems  Anesthesia Hx:  No problems with previous Anesthesia                Cardiovascular:     Hypertension    Dysrhythmias   CHF     AVILA         Shortness of Breath Dyspnea On Extertion      Congestive Heart Failure (CHF)                Hypertension     Disorder of Cardiac Rhythm, Atrial Fibrillation     Pulmonary:  Pneumonia  Asthma  Shortness of breath  Sleep Apnea   Asthma:    Obstructive Sleep Apnea (NILSA).       Pulmonary Infection:  Pneumonia.     Hepatic/GI:     GERD      Gerd          Musculoskeletal:  Arthritis        Arthritis          Neurological:  TIA, CVA            Arthritis              CVA - Cerebrovasular Accident     TIA - Transient Ischemic Attack               Psych:  Psychiatric History                  Physical Exam  General: Cachexia        Anesthesia Plan  Type of Anesthesia, risks & benefits discussed:    Anesthesia Type: Gen Natural Airway  Intra-op Monitoring Plan: Standard ASA Monitors  Induction:  IV  Informed Consent: Informed consent signed with the Patient and all parties understand the risks and agree with anesthesia plan.  All questions answered.   ASA Score: 3    Ready For Surgery From Anesthesia Perspective.     .

## 2024-01-25 NOTE — PLAN OF CARE
Pt is AAOx4, Vss, gaurav po fluids, denies pain, ambulates easily. IV removed, catheter intact. Discharge instructions provided and states understanding. States ready to go home.

## 2024-01-25 NOTE — TRANSFER OF CARE
"Anesthesia Transfer of Care Note    Patient: Ayla Dela Cruz    Procedure(s) Performed: Procedure(s) (LRB):  EGD (ESOPHAGOGASTRODUODENOSCOPY) (N/A)    Anesthesia Type: general    Post pain: adequate analgesia    Post assessment: no apparent anesthetic complications and tolerated procedure well    Post vital signs: stable    Level of consciousness: awake and responds to stimulation    Nausea/Vomiting: no nausea/vomiting    Complications: none    Transfer of care protocol was followed      Last vitals: Visit Vitals  /60   Pulse 62   Temp 36.5 °C (97.7 °F) (Skin)   Resp 18   Ht 5' 7" (1.702 m)   Wt 47.6 kg (105 lb)   SpO2 100%   Breastfeeding No   BMI 16.45 kg/m²     "

## 2024-01-25 NOTE — PROVATION PATIENT INSTRUCTIONS
Discharge Summary/Instructions after an Endoscopic Procedure  Patient Name: Ayla Dela Cruz  Patient MRN: [id number]  Patient YOB: 1945  Thursday, January 25, 2024  Alex Schwarz MD  Dear patient,  As a result of recent federal legislation (The Federal Cures Act), you may   receive lab or pathology results from your procedure in your [facility]   account before your physician is able to contact you. Your physician or   their representative will relay the results to you with their   recommendations at their soonest availability.  Thank you,  RESTRICTIONS:  During your procedure today, you received medications for sedation.  These   medications may affect your judgment, balance and coordination.  Therefore,   for 24 hours, you have the following restrictions:   - DO NOT drive a car, operate machinery, make legal/financial decisions,   sign important papers or drink alcohol.    ACTIVITY:  Today: no heavy lifting, straining or running due to procedural   sedation/anesthesia.  The following day: return to full activity including work.  DIET:  Eat and drink normally unless instructed otherwise.     TREATMENT FOR COMMON SIDE EFFECTS:  - Mild abdominal pain, nausea, belching, bloating or excessive gas:  rest,   eat lightly and use a heating pad.  - Sore Throat: treat with throat lozenges and/or gargle with warm salt   water.  - Because air was used during the procedure, expelling large amounts of air   from your rectum or belching is normal.  - If a bowel prep was taken, you may not have a bowel movement for 1-3 days.    This is normal.  SYMPTOMS TO WATCH FOR AND REPORT TO YOUR PHYSICIAN:  1. Abdominal pain or bloating, other than gas cramps.  2. Chest pain.  3. Back pain.  4. Signs of infection such as: chills or fever occurring within 24 hours   after the procedure.  5. Rectal bleeding, which would show as bright red, maroon, or black stools.   (A tablespoon of blood from the rectum is not serious,  especially if   hemorrhoids are present.)  6. Vomiting.  7. Weakness or dizziness.  GO DIRECTLY TO THE NEAREST EMERGENCY ROOM IF YOU HAVE ANY OF THE FOLLOWING:      Difficulty breathing              Chills and/or fever over 101 F   Persistent vomiting and/or vomiting blood   Severe abdominal pain   Severe chest pain   Black, tarry stools   Bleeding- more than one tablespoon   Any other symptom or condition that you feel may need urgent attention  Your doctor recommends these additional instructions:  If any biopsies were taken, your doctors clinic will contact you in 1 to 2   weeks with any results.  - Patient has a contact number available for emergencies.  The signs and   symptoms of potential delayed complications were discussed with the   patient.  Return to normal activities tomorrow.  Written discharge   instructions were provided to the patient.   - Resume previous diet.   - Continue present medications.   - No aspirin, ibuprofen, naproxen, or other non-steroidal anti-inflammatory   drugs.   - Await pathology results.   - Discharge patient to home (ambulatory).   - Resume Eliquis (apixaban) at prior dose tomorrow.   - Return to GI office after studies are complete.  For questions, problems or results please call your physician - Alex Schwarz MD at Work:  [phone].  [facility], EMERGENCY ROOM PHONE NUMBER: [phone]  IF A COMPLICATION OR EMERGENCY SITUATION ARISES AND YOU ARE UNABLE TO REACH   YOUR PHYSICIAN - GO DIRECTLY TO THE EMERGENCY ROOM.  Alex Schwarz MD  1/25/2024 2:58:13 PM  This report has been verified and signed electronically.  Dear patient,  As a result of recent federal legislation (The Federal Cures Act), you may   receive lab or pathology results from your procedure in your [facility]   account before your physician is able to contact you. Your physician or   their representative will relay the results to you with their   recommendations at their soonest  availability.  Thank you,  PROVATION

## 2024-01-25 NOTE — H&P
CC: Dysphagia    78 year old female with above. States that symptoms are stable, no alleviating/exacerbating factors. No bleeding or weight loss.     ROS:  No headache, no fever/chills, no chest pain/SOB, no nausea/vomiting/diarrhea/constipation/GI bleeding/abdominal pain, no dysuria/hematuria.    VSSAF   Exam:   Alert and oriented x 3; no apparent distress   PERRLA, sclera anicteric  CV: Regular rate/rhythm, normal PMI   Lungs: Clear bilaterally with no wheeze/rales   Abdomen: Soft, NT/ND, normal bowel sounds   Ext: No cyanosis, clubbing     Impression:   As above    Plan:   Proceed with endoscopy. Further recs to follow.

## 2024-01-26 VITALS
DIASTOLIC BLOOD PRESSURE: 65 MMHG | SYSTOLIC BLOOD PRESSURE: 140 MMHG | HEIGHT: 67 IN | BODY MASS INDEX: 16.48 KG/M2 | WEIGHT: 105 LBS | RESPIRATION RATE: 16 BRPM | TEMPERATURE: 98 F | OXYGEN SATURATION: 100 % | HEART RATE: 64 BPM

## 2024-01-26 NOTE — ANESTHESIA POSTPROCEDURE EVALUATION
Anesthesia Post Evaluation    Patient: Ayla Dela Cruz    Procedure(s) Performed: Procedure(s) (LRB):  EGD (ESOPHAGOGASTRODUODENOSCOPY) (N/A)    Final Anesthesia Type: general      Patient location during evaluation: PACU  Patient participation: Yes- Able to Participate  Level of consciousness: awake and alert and oriented  Post-procedure vital signs: reviewed and stable  Pain management: adequate  Airway patency: patent    PONV status at discharge: No PONV  Anesthetic complications: no      Cardiovascular status: blood pressure returned to baseline  Respiratory status: unassisted, spontaneous ventilation and room air  Hydration status: euvolemic  Follow-up not needed.              Vitals Value Taken Time   /65 01/25/24 1525   Temp 36.4 °C (97.6 °F) 01/25/24 1525   Pulse 64 01/25/24 1525   Resp 16 01/25/24 1525   SpO2 100 % 01/25/24 1525         Event Time   Out of Recovery 15:31:10         Pain/Chalino Score: Chalino Score: 10 (1/25/2024  3:25 PM)

## 2024-01-31 ENCOUNTER — CLINICAL SUPPORT (OUTPATIENT)
Dept: REHABILITATION | Facility: HOSPITAL | Age: 79
End: 2024-01-31
Payer: MEDICARE

## 2024-01-31 ENCOUNTER — TELEPHONE (OUTPATIENT)
Dept: ELECTROPHYSIOLOGY | Facility: CLINIC | Age: 79
End: 2024-01-31
Payer: MEDICARE

## 2024-01-31 DIAGNOSIS — R13.10 DYSPHAGIA, UNSPECIFIED TYPE: ICD-10-CM

## 2024-01-31 DIAGNOSIS — R63.4 UNINTENTIONAL WEIGHT LOSS: ICD-10-CM

## 2024-01-31 DIAGNOSIS — E46 PROTEIN MALNUTRITION: Primary | ICD-10-CM

## 2024-01-31 PROCEDURE — 92526 ORAL FUNCTION THERAPY: CPT | Mod: PO

## 2024-01-31 NOTE — PROGRESS NOTES
[facility]  Speech Therapy Treatment Note- Dysphagia  Date: 1/31/2024     Name: Ayla Dela Cruz   MRN: [id number]   Therapy Diagnosis:   Encounter Diagnosis   Name Primary?    Dysphagia, unspecified type Yes   Physician: Xavier Trejo,*  Physician Orders: Ambulatory Referral to Speech Therapy   Medical Diagnosis: TIA (transient ischemic attack) [G45.9]     Visit #/ Visits Authorized: 8/ 20 (1 previous)  Date of Evaluation:  12/22/2023  Insurance Authorization Period: 12/29/2023 to 12/31/2023  Plan of Care Expiration Date:    2/16/2023  Extended Plan of Care:  N/A   Progress Note: 1/25/2024     Time In:  1300  Time Out:  1330  Total Billable Time: 35     Precautions: Standard, Dysphagia, and Cardiac  Subjective:   Patient reports: she has been eating sandwiches with the crust cut off and either lunch meat or peanut butter and jelly. For Lunch she had her protein shake, a yogurt, and a bite of chicken  She was compliant to home exercise program.  Response to previous treatment: good  Pain Scale: no pain indicated throughout session  Objective:   UNTIMED  Procedure   Dysphagia Therapy     Short Term Goals: (4 weeks) Current Progress:   Patient will successfully participate in Fiberoptic Endoscopic Evaluation of Swallow to visualize oropharyngeal swallow function, rule out aspiration, and determine least restrictive diet. GOAL MET 1/10/2023      Patient will independently utilize the swallow strategy of oral preparatory set to consume liquids and solids 60-75 times per session.     Progressing/ Not Met 1/31/2024   Not addressed in today's session.      Met x2   3. Patient will decrease laryngeal and thyrohyoid space tension utilizing circumlaryngeal massage 2-5x/day and/or prior to oral intake with digital pressure.     Progressing/ Not Met 1/31/2024   Diaphragmatic breathing exercises have been carried over to implementation as needed    4. Patient will independently demonstrate adequate  use of effortful swallow technique to improve overall swallow strength, safety and efficiency 60-75x per session.    Patient is using her exercises at home independently per .     Patient can to session after eating lunch and stated she was too full to work with food this date.     Met x3 previously    Goal Met 1/31/2024     5. Patient will demonstrate the ability to adequately self-monitor swallowing skills and perform appropriate compensatory techniques to reduce s/s of aspiration     Progressing/ Not Met 1/31/2024   Not addressed in today's session.    6. Patient will participate in dysphagia education including clinical signs of aspiration with returned demonstration of information.  Goal Met 1/22/2024        Patient Education/Response:   Patient educated regarding the following: (continued)  Mindfulness exercises- diaphragmatic breathing  2.  Strategies to reduce swallow anxiety when eating and/or drinking  3. Discussed EGD   4. Referral to nutrition    Home program established: Program modified based on patient progress  Patient verbalized understanding to all above education provided.     See Electronic Medical Record under Patient Instructions for exercises provided throughout therapy.  Assessment:   See discharge note    Patient prognosis is Good. Patient will continue to benefit from skilled outpatient speech and language therapy to address the deficits listed in the problem list on initial evaluation, provide patient/family education and to maximize patient's level of independence in the home and community environment.   Medical necessity is demonstrated by the following IMPAIRMENTS:  Dysphagia: Decreased safety and efficiency of swallowing function on PO intake without overt s/s of aspiration for the highest diet level.   Barriers to Therapy: No barriers to therapy identified  Patient's spiritual, cultural and educational needs considered and patient agreeable to plan of care and goals.  Plan:    See discharge note.     Pallavi Blair CCC-SLP   1/31/2024

## 2024-01-31 NOTE — PLAN OF CARE
Outpatient Therapy Discharge Summary   Discharge Date: 1/31/2024   Name: Ayla Dela Cruz  Clinic Number: [id number]  Therapy Diagnosis:   Encounter Diagnosis   Name Primary?    Dysphagia, unspecified type Yes     Physician: Xavier Trejo,Calvin  Physician Orders: Ambulatory Referral to Speech Therapy   Medical Diagnosis: TIA (transient ischemic attack) [G45.9]   Evaluation Date: 12/22/2023     Date of Last visit: 1/31/2024   Total Visits Received: 9  Cancelled Visits: 0  No Show Visits: 0    Assessment    Assessment of Current Status: Patient was seen for Fiberoptic Endoscopic Evaluation of the Swallow (FEES) and had an EGD completed with dilation. Swallow is normal, but restricted due to fear. Swallowing strategies introduced and trained. Patient now independently with these strategies and the anxiety needs to be managed by psychiatry.  Patient is appropriate for discharge this date and patient and  in agreement. Referral to nutrition required.     Goals:   Short Term Goals: (4 weeks) Current Progress:   Patient will successfully participate in Fiberoptic Endoscopic Evaluation of Swallow to visualize oropharyngeal swallow function, rule out aspiration, and determine least restrictive diet. GOAL MET 1/10/2023      Patient will independently utilize the swallow strategy of oral preparatory set to consume liquids and solids 60-75 times per session.  Met x2   3. Patient will decrease laryngeal and thyrohyoid space tension utilizing circumlaryngeal massage 2-5x/day and/or prior to oral intake with digital pressure.  Not met    4. Patient will independently demonstrate adequate use of effortful swallow technique to improve overall swallow strength, safety and efficiency 60-75x per session.    Goal Met 1/31/2024     5. Patient will demonstrate the ability to adequately self-monitor swallowing skills and perform appropriate compensatory techniques to reduce s/s of aspiration  Not met    6. Patient will  participate in dysphagia education including clinical signs of aspiration with returned demonstration of information.  Goal Met 1/22/2024          Long Term Goals:  Long Term Goals: (8 weeks) Current Progress:   Patient will maintain adequate hydration/nutrition with optimum safety and efficiency of swallowing function on least restrictive PO diet level without overt signs and symptoms of aspiration.     Goal Met 1/31/2024      2. Patient will utilize compensatory strategies with optimum safety and efficiency of swallowing function on least restrictive PO diet level without over signs and symptoms of aspiration.    Goal partially met        Discharge reason: Patient has reached the maximum rehab potential for the present time    Plan   This patient is discharged from Speech Therapy    Pallavi Blair CCC-SLP   1/31/2024

## 2024-02-02 ENCOUNTER — PATIENT MESSAGE (OUTPATIENT)
Dept: OTOLARYNGOLOGY | Facility: CLINIC | Age: 79
End: 2024-02-02
Payer: MEDICARE

## 2024-02-05 LAB
OHS CV AF BURDEN PERCENT: < 1
OHS CV DC REMOTE DEVICE TYPE: NORMAL
OHS CV ICD SHOCK: NO
OHS CV RV PACING PERCENT: 5.4 %

## 2024-02-12 RX ORDER — APIXABAN 5 MG/1
5 TABLET, FILM COATED ORAL 2 TIMES DAILY
Qty: 60 TABLET | Refills: 0 | Status: SHIPPED | OUTPATIENT
Start: 2024-02-12 | End: 2024-04-25 | Stop reason: DRUGHIGH

## 2024-02-12 NOTE — TELEPHONE ENCOUNTER
Refill Routing Note   Medication(s) are not appropriate for processing by [facility] for the following reason(s):        Outside of protocol    [facility] action(s):  Route               Appointments  past 12m or future 3m with PCP    Date Provider   Last Visit   2/23/2023 Jan Olivo MD   Next Visit   2/27/2024 Jan Olivo MD   ED visits in past 90 days: 0        Note composed:8:37 AM 02/12/2024

## 2024-02-14 ENCOUNTER — OFFICE VISIT (OUTPATIENT)
Dept: PSYCHIATRY | Facility: CLINIC | Age: 79
End: 2024-02-14
Payer: MEDICARE

## 2024-02-14 VITALS
WEIGHT: 110.25 LBS | DIASTOLIC BLOOD PRESSURE: 61 MMHG | HEART RATE: 54 BPM | SYSTOLIC BLOOD PRESSURE: 117 MMHG | HEIGHT: 67 IN | BODY MASS INDEX: 17.3 KG/M2

## 2024-02-14 DIAGNOSIS — F40.298 SPECIFIC PHOBIA: ICD-10-CM

## 2024-02-14 DIAGNOSIS — F33.1 MAJOR DEPRESSIVE DISORDER, RECURRENT, MODERATE: ICD-10-CM

## 2024-02-14 DIAGNOSIS — R41.89 COGNITIVE IMPAIRMENT: ICD-10-CM

## 2024-02-14 DIAGNOSIS — F41.0 PANIC DISORDER: ICD-10-CM

## 2024-02-14 DIAGNOSIS — F41.9 ANXIETY: Primary | ICD-10-CM

## 2024-02-14 PROCEDURE — 3078F DIAST BP <80 MM HG: CPT | Mod: CPTII,S$GLB,, | Performed by: PSYCHOLOGIST

## 2024-02-14 PROCEDURE — 1159F MED LIST DOCD IN RCRD: CPT | Mod: CPTII,S$GLB,, | Performed by: PSYCHOLOGIST

## 2024-02-14 PROCEDURE — 3288F FALL RISK ASSESSMENT DOCD: CPT | Mod: CPTII,S$GLB,, | Performed by: PSYCHOLOGIST

## 2024-02-14 PROCEDURE — 1157F ADVNC CARE PLAN IN RCRD: CPT | Mod: CPTII,S$GLB,, | Performed by: PSYCHOLOGIST

## 2024-02-14 PROCEDURE — 3074F SYST BP LT 130 MM HG: CPT | Mod: CPTII,S$GLB,, | Performed by: PSYCHOLOGIST

## 2024-02-14 PROCEDURE — 90833 PSYTX W PT W E/M 30 MIN: CPT | Mod: S$GLB,,, | Performed by: PSYCHOLOGIST

## 2024-02-14 PROCEDURE — 99214 OFFICE O/P EST MOD 30 MIN: CPT | Mod: S$GLB,,, | Performed by: PSYCHOLOGIST

## 2024-02-14 PROCEDURE — 99999 PR PBB SHADOW E&M-EST. PATIENT-LVL III: CPT | Mod: PBBFAC,,, | Performed by: PSYCHOLOGIST

## 2024-02-14 PROCEDURE — 1101F PT FALLS ASSESS-DOCD LE1/YR: CPT | Mod: CPTII,S$GLB,, | Performed by: PSYCHOLOGIST

## 2024-02-14 PROCEDURE — 1126F AMNT PAIN NOTED NONE PRSNT: CPT | Mod: CPTII,S$GLB,, | Performed by: PSYCHOLOGIST

## 2024-02-14 RX ORDER — VORTIOXETINE 20 MG/1
20 TABLET, FILM COATED ORAL DAILY
Qty: 30 TABLET | Refills: 3 | Status: SHIPPED | OUTPATIENT
Start: 2024-02-14 | End: 2024-04-17 | Stop reason: SDUPTHER

## 2024-02-14 RX ORDER — LORAZEPAM 0.5 MG/1
0.5 TABLET ORAL DAILY PRN
Qty: 30 TABLET | Refills: 0 | Status: SHIPPED | OUTPATIENT
Start: 2024-02-14

## 2024-02-14 RX ORDER — MIRTAZAPINE 7.5 MG/1
7.5 TABLET, FILM COATED ORAL NIGHTLY
Qty: 90 TABLET | Refills: 0 | Status: SHIPPED | OUTPATIENT
Start: 2024-02-14 | End: 2024-04-17 | Stop reason: SDUPTHER

## 2024-02-14 NOTE — PROGRESS NOTES
"Outpatient Psychiatry Follow-Up Visit    Clinical Status of Patient: Outpatient (Ambulatory)  02/14/2024     Chief Complaint: 78 y/o female presenting today with  for a follow-up.       Interval History and Content of Current Session:  Interim Events/Subjective Report/Content of Current Session: 78 y/o female follow-up appointment.    Pt is a 78 y/o female with past psychiatric hx of depression, anxiety, eating disorder who presents for follow-up treatment. Pt reported that "speech therapy" went well and pt was discharged. Remeron is helping with sleep and appetite. Is eating more and gaining weight. Using the cpap machine each night with less difficulty. Acquired a new mask and it fits better. Anxiety has been better in the mornings and not using ativan each morning any longer. Taking it about 3-4x per week. Has started reading again and will start doing art again. Completing more tasks around the house. Taking propranolol BID from cardio.    Past Psychiatric hx: remeron, rexulti, vraylar, Limbitrol, venlafaxine, fluoxetine, Abilify, gabapentin, amitriptyline, buspirone, cannot remember others.     Past Medical hx:   Past Medical History:   Diagnosis Date    Anticoagulant long-term use     Anxiety     Arthritis     Atrial fibrillation     Basal cell carcinoma     Cancer     skin    CHF (congestive heart failure)     Depression     DVT (deep venous thrombosis)     GERD (gastroesophageal reflux disease)     Glaucoma (increased eye pressure)     Hyperlipidemia     diet controlled    Hypertension     Interstitial lung disease     Localized hives 01/10/2020    Mild persistent asthma without complication 11/12/2018    Pacemaker     Pneumonia 01/31/2014    Stroke 06/03/2014    Stroke     TIA (transient ischemic attack)     TIA (transient ischemic attack)         Interim hx:  Medication changes last visit: Start trial of mirtazapine 7.5 mg   Anxiety: decrease  Depression: decrease     Denies suicidal/homicidal " ideations.  Denies hopelessness/worthlessness.    Denies auditory/visual hallucinations      Alcohol: Infrequent use  Drug: Pt denied  Caffeine: Not assessed  Tobacco: Pt denied      Review of Systems   PSYCHIATRIC: Pertinent items are noted in the narrative.        CONSTITUTIONAL: weight stable    Past Medical, Family and Social History: The patient's past medical, family and social history have been reviewed and updated as appropriate within the electronic medical record. See encounter notes.     Current Psychiatric Medication:  ativan 0.5 mg TID PRN, buspirone 10 mg TID, trintillex 20 mg, mirtazapine 7.5 mg      Compliance: yes      Side effects: Pt denied     Risk Parameters:  Patient reports no suicidal ideation  Patient reports no homicidal ideation  Patient reports no self-injurious behavior  Patient reports no violent behavior     Exam (detailed: at least 9 elements; comprehensive: all 15 elements)   Constitutional  Vitals:  Most recent vital signs, dated less than 90 days prior to this appointment, were reviewed. BP: ()/()   Arterial Line BP: ()/()       General:  unremarkable, age appropriate, casual attire, good eye contact, good rapport       Musculoskeletal  Muscle Strength/Tone:  no flaccidity, no tremor    Gait & Station:  normal      Psychiatric                       Speech:  normal tone, normal rate, rhythm, and volume   Mood & Affect:   Mildly anxious         Thought Process:   Goal directed; Linear    Associations:   intact   Thought Content:   No SI/HI, delusions, or paranoia, no AV/VH   Insight & Judgement:   Good, adequate to circumstances   Orientation:   grossly intact; alert and oriented x 4    Memory:  intact for content of interview    Language:  grossly intact, can repeat    Attention Span  : Grossly intact for content of interview   Fund of Knowledge:   intact and appropriate to age and level of education        Assessment and Diagnosis   Status/Progress: Based on the examination today,  the patient's problem(s) is/are adequately controlled.  New problems have not been presented today. Co-morbidities are not complicating management of the primary condition. There are no active rule-out diagnoses for this patient at this time.      Impression: Pt appears to have decreased anxiety with improved functioning, sleep, and appetite. Continue to discuss CBT and coping strategies. Will decrease dose of ativan and continue with medication plan and monitor symptoms moving forward.     Diagnosis:   1) Major Depressive Disorder, recurrent, moderate  2) Generalized Anxiety Disorder  3) Specific Phobia  4) Panic Disorder   5) Personality Disorder traits  6) R/o Dementia of unknown etiology  Intervention/Counseling/Treatment Plan   Medication Management:      1. Decrease ativan 0.5 mg  PRN    2. buspirone to 10 mg TID    3. trintillex to 20 mg    4. mirtazapine 7.5 mg     5. Call to report any worsening of symptoms or problems with the medication. Pt instructed to go to ER with thoughts of harming self, others     6. Cont in therapy as needed    Psychotherapy: 20 minutes  Target symptoms: anxiety  Why chosen therapy is appropriate versus another modality: CBT used; relevant to diagnosis, patient responds to this modality  Outcome monitoring methods: self-report, observation  Therapeutic intervention type: Cognitive Behavioral Therapy, Exposure therapy  Topics discussed/themes: building skills sets for symptom management, symptom recognition, nutrition, exercise  The patient's response to the intervention is accepting  Patient's response to treatment is: good.   The patient's progress toward treatment goals: limited to fair     Return to clinic: 2 months    -Cognitive-Behavioral/Supportive therapy and psychoeducation provided  -R/B/SE's of medications discussed with the pt who expresses understanding and chooses to take medications as prescribed.   -Pt instructed to call clinic, 911 or go to nearest emergency room  if sxs worsen or pt is in   crisis. The pt expresses understanding.    Galo Lipscomb, PhD, MP     Antidepressant/Antianxiety Medication Initiation:  Patient informed of risks, benefits, and potential side effects of medication and accepts informed consent.  Common side effects include nausea, fatigue, headache, insomnia., Specifically discussed the possibility of new or worsening suicidal thoughts/depression.  Patient instructed to stop the medication immediately and seek urgent treatment if this occurs. Patient instructed not to abruptly discontinue medication without physician guidance except in cases of sudden onset or worsening of SI.       Stimulant Medication Initiation:  Patient advised of risks, benefits, and side effects of medication and accepts informed consent.  Common side effects include insomnia, irritability, jittery feeling, dry mouth, and agitation/hostility., Patient advised of potential addictive nature of medication and controlled substance classification.  Instructed to safeguard medication as no early refills can be given for lost or stolen medications.       Benzodiazepine Initiation:  Patient advised of the risks, benefits, and common side effects of medication and has accepted informed consent.  Common side effects include drowsiness, impaired coordination, possible memory loss., Patient advised NOT to operate a vehicle or machinery untiil they are sure how the medication will affec them.  Client also advised of danger of mixing this medication with alcohol., Patient advised of potential addictive nature of medication and need to safeguard medication as no early refills for lost or stolen medications can be authorized.

## 2024-02-26 ENCOUNTER — OFFICE VISIT (OUTPATIENT)
Dept: DERMATOLOGY | Facility: CLINIC | Age: 79
End: 2024-02-26
Payer: MEDICARE

## 2024-02-26 VITALS — HEIGHT: 67 IN | BODY MASS INDEX: 17.3 KG/M2 | WEIGHT: 110.25 LBS

## 2024-02-26 DIAGNOSIS — Z85.828 ENCOUNTER FOR FOLLOW-UP SURVEILLANCE OF SKIN CANCER: ICD-10-CM

## 2024-02-26 DIAGNOSIS — L57.0 ACTINIC KERATOSES: Primary | ICD-10-CM

## 2024-02-26 DIAGNOSIS — L82.1 SEBORRHEIC KERATOSES: ICD-10-CM

## 2024-02-26 DIAGNOSIS — L30.9 DERMATITIS: ICD-10-CM

## 2024-02-26 DIAGNOSIS — Z08 ENCOUNTER FOR FOLLOW-UP SURVEILLANCE OF SKIN CANCER: ICD-10-CM

## 2024-02-26 PROCEDURE — 17000 DESTRUCT PREMALG LESION: CPT | Mod: S$GLB,,, | Performed by: DERMATOLOGY

## 2024-02-26 PROCEDURE — 1157F ADVNC CARE PLAN IN RCRD: CPT | Mod: CPTII,S$GLB,, | Performed by: DERMATOLOGY

## 2024-02-26 PROCEDURE — 1159F MED LIST DOCD IN RCRD: CPT | Mod: CPTII,S$GLB,, | Performed by: DERMATOLOGY

## 2024-02-26 PROCEDURE — 17003 DESTRUCT PREMALG LES 2-14: CPT | Mod: S$GLB,,, | Performed by: DERMATOLOGY

## 2024-02-26 PROCEDURE — 1101F PT FALLS ASSESS-DOCD LE1/YR: CPT | Mod: CPTII,S$GLB,, | Performed by: DERMATOLOGY

## 2024-02-26 PROCEDURE — 1126F AMNT PAIN NOTED NONE PRSNT: CPT | Mod: CPTII,S$GLB,, | Performed by: DERMATOLOGY

## 2024-02-26 PROCEDURE — 99213 OFFICE O/P EST LOW 20 MIN: CPT | Mod: 25,S$GLB,, | Performed by: DERMATOLOGY

## 2024-02-26 PROCEDURE — 1160F RVW MEDS BY RX/DR IN RCRD: CPT | Mod: CPTII,S$GLB,, | Performed by: DERMATOLOGY

## 2024-02-26 PROCEDURE — 3288F FALL RISK ASSESSMENT DOCD: CPT | Mod: CPTII,S$GLB,, | Performed by: DERMATOLOGY

## 2024-02-26 RX ORDER — TRIAMCINOLONE ACETONIDE 1 MG/G
CREAM TOPICAL
Qty: 80 G | Refills: 3 | Status: SHIPPED | OUTPATIENT
Start: 2024-02-26

## 2024-02-26 NOTE — PROGRESS NOTES
Subjective:      Patient ID:  Ayla Dela Cruz is a 78 y.o. female who presents for   Chief Complaint   Patient presents with    Skin Check     UBSE     LOV 11/30/23 - NUB, dermatitis      Skin, right upper arm, shave biopsy:  - HYPERTROPHIC ACTINIC KERATOSIS.  - THE ATYPICAL SQUAMOUS EPITHELIUM EXTENDS TO THE BASE OF THE BIOPSY, AND AN UNDERLYING INVASIVE SQUAMOUS CELL CARCINOMA CANNOT BE ENTIRELY EXCLUDED.      Skin, right angle of mandible, shave biopsy:  - BASAL CELL CARCINOMA WITH MIXED SUPERFICIAL AND NODULAR GROWTH PATTERN.  - THE TUMOR EXTENDS TO THE DEEP BIOPSY MARGIN.    Skin, left lateral cheek, shave biopsy:  - ACTINIC KERATOSIS, FOCALLY EXCORIATED/ TRAUMATIZED.  - THE ATYPICAL SQUAMOUS EPITHELIUM EXTENDS TO THE BASE OF THE BIOPSY, AND AN UNDERLYING INVASIVE SQUAMOUS CELL CARCINOMA CANNOT BE ENTIRELY EXCLUDED.         Pt here today for UBSE - 6 month f/u   No further concerns     Derm HX:  BCC left upper lip- 10/2022 , MOHs with  01/2023  BCC left upper back- 10/2022 , E&S  BCC right angle of mandible E&s 12/2023  Denies Fhx of MM       Current Outpatient Medications:   ·  apixaban (ELIQUIS) 5 mg Tab, TAKE 1 TABLET (5 MG TOTAL) BY MOUTH 2 (TWO) TIMES DAILY., Disp: 60 tablet, Rfl: 0  ·  aspirin 81 MG Chew, Take 81 mg by mouth once daily., Disp: , Rfl:   ·  atropine 1% (ISOPTO ATROPINE) 1 % Drop, PLACE 1 DROP INTO THE RIGHT EYE 2 TIMES A DAY., Disp: 5 mL, Rfl: 6  ·  busPIRone (BUSPAR) 10 MG tablet, Take 1 tablet (10 mg total) by mouth 3 (three) times daily., Disp: 90 tablet, Rfl: 2  ·  dorzolamide-timolol 2-0.5% (COSOPT) 22.3-6.8 mg/mL ophthalmic solution, 1 drop into affected eye Ophthalmic Twice a day, Disp: , Rfl:   ·  latanoprost 0.005 % ophthalmic solution, Place 1 drop into both eyes once daily., Disp: 2.5 mL, Rfl: 11  ·  LORazepam (ATIVAN) 0.5 MG tablet, Take 1 tablet (0.5 mg total) by mouth daily as needed for Anxiety., Disp: 30 tablet, Rfl: 0  ·   mirtazapine (REMERON) 7.5 MG Tab, Take 1 tablet (7.5 mg total) by mouth every evening., Disp: 90 tablet, Rfl: 0  ·  propranoloL (INDERAL) 40 MG tablet, Take 1 tablet (40 mg total) by mouth 2 (two) times daily., Disp: 60 tablet, Rfl: 11  ·  triamcinolone acetonide 0.1% (KENALOG) 0.1 % cream, AAA back bid PRN rash, Disp: 80 g, Rfl: 3  ·  vortioxetine (TRINTELLIX) 20 mg Tab, Take 1 tablet (20 mg total) by mouth Daily., Disp: 30 tablet, Rfl: 3  ·  pantoprazole (PROTONIX) 40 MG tablet, Take 1 tablet (40 mg total) by mouth once daily., Disp: 90 tablet, Rfl: 3  No current facility-administered medications for this visit.    Facility-Administered Medications Ordered in Other Visits:   ·  bupivacaine (PF) 0.25% (2.5 mg/ml) injection, , , PRN, Galo Clancy MD, 6 mL at 02/19/19 1314  ·  lidocaine (PF) 10 mg/ml (1%) injection, , , PRN, Galo Clancy MD, 9 mL at 02/19/19 1304  ·  lidocaine (PF) 20 mg/ml (2%) injection, , , PRN, Galo Clancy MD, 6 mL at 02/19/19 1310  ·  methylPREDNISolone acetate injection, , , PRN, Galo Clancy MD, 80 mg at 02/19/19 1314        Review of Systems   Constitutional:  Negative for fever, chills and fatigue.   Skin:  Positive for daily sunscreen use, activity-related sunscreen use and wears hat.       Objective:   Physical Exam   Constitutional: She appears well-developed and well-nourished. No distress.   Neurological: She is alert and oriented to person, place, and time. She is not disoriented.   Psychiatric: She has a normal mood and affect.   Skin:   Areas Examined (abnormalities noted in diagram):   Scalp / Hair Palpated and Inspected  Head / Face Inspection Performed  Neck Inspection Performed  Chest / Axilla Inspection Performed  Abdomen Inspection Performed  Back Inspection Performed  RUE Inspected  LUE Inspection Performed  Nails and Digits Inspection Performed                     Diagram Legend     Erythematous scaling macule/papule c/w actinic keratosis       Vascular papule c/w angioma       Pigmented verrucoid papule/plaque c/w seborrheic keratosis      Yellow umbilicated papule c/w sebaceous hyperplasia      Irregularly shaped tan macule c/w lentigo     1-2 mm smooth white papules consistent with Milia      Movable subcutaneous cyst with punctum c/w epidermal inclusion cyst      Subcutaneous movable cyst c/w pilar cyst      Firm pink to brown papule c/w dermatofibroma      Pedunculated fleshy papule(s) c/w skin tag(s)      Evenly pigmented macule c/w junctional nevus     Mildly variegated pigmented, slightly irregular-bordered macule c/w mildly atypical nevus      Flesh colored to evenly pigmented papule c/w intradermal nevus       Pink pearly papule/plaque c/w basal cell carcinoma      Erythematous hyperkeratotic cursted plaque c/w SCC      Surgical scar with no sign of skin cancer recurrence      Open and closed comedones      Inflammatory papules and pustules      Verrucoid papule consistent consistent with wart     Erythematous eczematous patches and plaques     Dystrophic onycholytic nail with subungual debris c/w onychomycosis     Umbilicated papule    Erythematous-base heme-crusted tan verrucoid plaque consistent with inflamed seborrheic keratosis     Erythematous Silvery Scaling Plaque c/w Psoriasis     See annotation      Assessment / Plan:        Actinic keratoses  Cryosurgery Procedure Note    Verbal consent from the patient is obtained and the patient is aware of the precancerous quality and need for treatment of these lesions. Liquid nitrogen cryosurgery is applied to the 5 actinic keratoses, as detailed in the physical exam, to produce a freeze injury. The patient is aware that blisters may form and is instructed on wound care with gentle cleansing and use of vaseline ointment to keep moist until healed. The patient is supplied a handout on cryosurgery and is instructed to call if lesions do not completely resolve.    Seborrheic keratoses  These are benign inherited growths without a  malignant potential. Reassurance given to patient. No treatment is necessary.     Encounter for follow-up surveillance of skin cancer  Area of previous NMSC (multiple) examined. Sites well healed with no signs of recurrence.  Upper body skin examination performed today including at least 9 points as noted in physical examination. No lesions suspicious for malignancy noted.    Dermatitis  -     triamcinolone acetonide 0.1% (KENALOG) 0.1 % cream; AAA back bid PRN rash  Dispense: 80 g; Refill: 3  Mild, back, controlling with episodic tac application  Good skin care regimen discussed including limiting to one bath or shower/day, using lukewarm water with mild soap and moisturizing cream to skin 1 - 2x/day.       Patient instructed in importance in daily broad spectrum sun protection of at least spf 30. Mineral sunscreen ingredients preferred (Zinc +/- Titanium) and can be found OTC.   Recommend Elta MD for daily use on face and neck.  Patient encouraged to wear hat for all outdoor exposure.   Also discussed sun avoidance and use of protective clothing.           Follow up in about 6 months (around 8/26/2024).

## 2024-02-26 NOTE — PATIENT INSTRUCTIONS

## 2024-02-27 ENCOUNTER — OFFICE VISIT (OUTPATIENT)
Dept: FAMILY MEDICINE | Facility: CLINIC | Age: 79
End: 2024-02-27
Attending: FAMILY MEDICINE
Payer: MEDICARE

## 2024-02-27 VITALS
BODY MASS INDEX: 17.37 KG/M2 | TEMPERATURE: 98 F | HEART RATE: 60 BPM | OXYGEN SATURATION: 96 % | HEIGHT: 67 IN | DIASTOLIC BLOOD PRESSURE: 70 MMHG | WEIGHT: 110.69 LBS | SYSTOLIC BLOOD PRESSURE: 126 MMHG

## 2024-02-27 DIAGNOSIS — F50.00 ANOREXIA NERVOSA: ICD-10-CM

## 2024-02-27 DIAGNOSIS — J45.20 MILD INTERMITTENT ASTHMA WITHOUT COMPLICATION: ICD-10-CM

## 2024-02-27 DIAGNOSIS — I50.32 CHRONIC DIASTOLIC HEART FAILURE: ICD-10-CM

## 2024-02-27 DIAGNOSIS — Z00.00 ENCOUNTER FOR MEDICARE ANNUAL WELLNESS EXAM: ICD-10-CM

## 2024-02-27 DIAGNOSIS — I48.0 PAROXYSMAL ATRIAL FIBRILLATION: ICD-10-CM

## 2024-02-27 DIAGNOSIS — J84.9 INTERSTITIAL LUNG DISEASE: ICD-10-CM

## 2024-02-27 DIAGNOSIS — I10 PRIMARY HYPERTENSION: ICD-10-CM

## 2024-02-27 DIAGNOSIS — F41.1 GAD (GENERALIZED ANXIETY DISORDER): ICD-10-CM

## 2024-02-27 DIAGNOSIS — G62.9 NEUROPATHY: ICD-10-CM

## 2024-02-27 DIAGNOSIS — Z00.00 ENCOUNTER FOR PREVENTIVE HEALTH EXAMINATION: Primary | ICD-10-CM

## 2024-02-27 DIAGNOSIS — G47.33 OSA ON CPAP: ICD-10-CM

## 2024-02-27 DIAGNOSIS — Z95.0 CARDIAC PACEMAKER IN SITU: ICD-10-CM

## 2024-02-27 DIAGNOSIS — E44.0 MODERATE PROTEIN-CALORIE MALNUTRITION: ICD-10-CM

## 2024-02-27 DIAGNOSIS — F33.1 MODERATE EPISODE OF RECURRENT MAJOR DEPRESSIVE DISORDER: ICD-10-CM

## 2024-02-27 DIAGNOSIS — E78.00 PURE HYPERCHOLESTEROLEMIA: ICD-10-CM

## 2024-02-27 DIAGNOSIS — I27.20 PULMONARY HYPERTENSION: ICD-10-CM

## 2024-02-27 DIAGNOSIS — R41.89 COGNITIVE IMPAIRMENT: ICD-10-CM

## 2024-02-27 DIAGNOSIS — J31.0 CHRONIC RHINITIS: ICD-10-CM

## 2024-02-27 DIAGNOSIS — R54 FRAIL ELDERLY: ICD-10-CM

## 2024-02-27 DIAGNOSIS — F13.20 BENZODIAZEPINE DEPENDENCE: ICD-10-CM

## 2024-02-27 DIAGNOSIS — H40.1134 PRIMARY OPEN ANGLE GLAUCOMA (POAG) OF BOTH EYES, INDETERMINATE STAGE: ICD-10-CM

## 2024-02-27 PROBLEM — H43.11 VITREOUS HEMORRHAGE, RIGHT EYE: Status: RESOLVED | Noted: 2022-07-06 | Resolved: 2024-02-27

## 2024-02-27 PROCEDURE — 3288F FALL RISK ASSESSMENT DOCD: CPT | Mod: CPTII,S$GLB,, | Performed by: FAMILY MEDICINE

## 2024-02-27 PROCEDURE — 99397 PER PM REEVAL EST PAT 65+ YR: CPT | Mod: S$GLB,,, | Performed by: FAMILY MEDICINE

## 2024-02-27 PROCEDURE — 1157F ADVNC CARE PLAN IN RCRD: CPT | Mod: CPTII,S$GLB,, | Performed by: FAMILY MEDICINE

## 2024-02-27 PROCEDURE — 1160F RVW MEDS BY RX/DR IN RCRD: CPT | Mod: CPTII,S$GLB,, | Performed by: FAMILY MEDICINE

## 2024-02-27 PROCEDURE — 99999 PR PBB SHADOW E&M-EST. PATIENT-LVL III: CPT | Mod: PBBFAC,,, | Performed by: FAMILY MEDICINE

## 2024-02-27 PROCEDURE — 1159F MED LIST DOCD IN RCRD: CPT | Mod: CPTII,S$GLB,, | Performed by: FAMILY MEDICINE

## 2024-02-27 PROCEDURE — 3078F DIAST BP <80 MM HG: CPT | Mod: CPTII,S$GLB,, | Performed by: FAMILY MEDICINE

## 2024-02-27 PROCEDURE — 1101F PT FALLS ASSESS-DOCD LE1/YR: CPT | Mod: CPTII,S$GLB,, | Performed by: FAMILY MEDICINE

## 2024-02-27 PROCEDURE — 3074F SYST BP LT 130 MM HG: CPT | Mod: CPTII,S$GLB,, | Performed by: FAMILY MEDICINE

## 2024-02-27 PROCEDURE — 1126F AMNT PAIN NOTED NONE PRSNT: CPT | Mod: CPTII,S$GLB,, | Performed by: FAMILY MEDICINE

## 2024-02-27 RX ORDER — AZELASTINE 1 MG/ML
1 SPRAY, METERED NASAL 2 TIMES DAILY
Qty: 30 ML | Refills: 5 | Status: SHIPPED | OUTPATIENT
Start: 2024-02-27 | End: 2025-02-26

## 2024-02-27 NOTE — PROGRESS NOTES
Subjective:       Patient ID: Ayla Dela Cruz is a 78 y.o. female.    Chief Complaint: Annual Exam    78-year-old female coming in for annual exam.  Her main complaint is nasal congestion and drainage typically worse in the morning when she is coming off her CPAP but also at other times.  She has been irrigating with saline which helps clear the material out but she continues to drain.  She has used a number of products but not Astelin.    History includes a previous stroke, cognitive impairment, degenerative spondylosis of cervical and lumbar spine with several epidural steroids and radiofrequency ablations in the past.  She has anxiety and depression and is benzodiazepine dependent.  She has glaucoma followed by Dr. Tan and has had a right eye hemorrhage in the past which has now resolved.  She has interstitial lung disease and moderate intermittent asthma without complications followed by Pulmonary, paroxysmal atrial fibrillation chads five status post multiple ablations and followed by Dr. Jurado.  She has hypertension, hyperlipidemia, diastolic heart failure, fibroids, anticoagulation due to history of DVT, iron deficiency anemia, reflux, elevated liver functions, osteoarthritis of multiple joints and sleep apnea on CPAP.  She has lost a good bit of weight with her cognitive impairment and has actually been diagnosed as anorexia nervosa by her psychiatric consultant.  She was recently placed on Remeron which has resulted in improvement in her intake.  She is up 6.6 lb from her last physical.    Past Medical History:  No date: Anticoagulant long-term use  No date: Anxiety  No date: Arthritis  No date: Atrial fibrillation  No date: Basal cell carcinoma  No date: Cancer      Comment:  skin  No date: CHF (congestive heart failure)  No date: Depression  No date: DVT (deep venous thrombosis)  No date: GERD (gastroesophageal reflux disease)  No date: Glaucoma (increased eye pressure)  No date: Hyperlipidemia       Comment:  diet controlled  No date: Hypertension  No date: Interstitial lung disease  01/10/2020: Localized hives  11/12/2018: Mild persistent asthma without complication  No date: Pacemaker  01/31/2014: Pneumonia  06/03/2014: Stroke  No date: Stroke  No date: TIA (transient ischemic attack)  No date: TIA (transient ischemic attack)    Past Surgical History:  No date: 2 heart ablations      Comment:  3 in total  10/14/2021: A-V CARDIAC PACEMAKER INSERTION; Left      Comment:  Procedure: INSERTION, CARDIAC PACEMAKER, DUAL CHAMBER;                 Surgeon: Fco Calderon MD;  Location: [facility] EP LAB;                 Service: Cardiology;  Laterality: Left;  PAF/ [place]                Arrthymias, Dual PPM, SJM, MAC, GP, 3 PREP  7/27/2022: ANTERIOR VITRECTOMY; Right      Comment:  Procedure: VITRECTOMY, ANTERIOR APPROACH;  Surgeon:                Daniel Tan MD;  Location: [facility] OR;  Service:                Ophthalmology;  Laterality: Right;  No date: bilateral cataracts  No date: CHOLECYSTECTOMY  1/19/2017: COLONOSCOPY; N/A      Comment:  Procedure: COLONOSCOPY and EGD;  Surgeon: Jonathan Schultz MD;  Location: [facility];  Service: Endoscopy;                 Laterality: N/A;  10/10/2019: COLONOSCOPY; N/A      Comment:  Procedure: COLONOSCOPY;  Surgeon: Alex Christian MD;                Location: [facility];  Service: Endoscopy;  Laterality:                N/A;  10/14/2019: ESOPHAGOGASTRODUODENOSCOPY; N/A      Comment:  Procedure: EGD (ESOPHAGOGASTRODUODENOSCOPY);  Surgeon:                Alex Christian MD;  Location: [facility];  Service:                Endoscopy;  Laterality: N/A;  1/25/2024: ESOPHAGOGASTRODUODENOSCOPY; N/A      Comment:  Procedure: EGD (ESOPHAGOGASTRODUODENOSCOPY);  Surgeon:                Alex Christian MD;  Location: [facility];  Service:                Endoscopy;  Laterality: N/A;  6/7/2018: INJECTION OF ANESTHETIC AGENT AROUND MEDIAL BRANCH NERVES   INNERVATING CERVICAL FACET  JOINT; Right      Comment:  Procedure: BLOCK, NERVE, FACET JOINT, MEDIAL BRANCH,                CERVICAL;  Surgeon: Galo Clancy MD;  [facility]                 Service: Pain Management;  Laterality: Right;  C4, 5, 6  11/1/2018: OPEN REDUCTION AND INTERNAL FIXATION (ORIF) OF INJURY OF   ANKLE; Right      Comment:  Procedure: ORIF, ANKLE;  Surgeon: Wilfredo Aguero MD;                Location: [facility] OR;  Service: Orthopedics;  Laterality:                Right;  7/27/2022: PARACENTESIS, EYE, ANTERIOR CHAMBER, WITH AQUEOUS REMOVAL;   Right      Comment:  Procedure: Anterior chamber washout, possible IOL                removal;  Surgeon: Daniel Tan MD;  Location: [facility]                OR;  Service: Ophthalmology;  Laterality: Right;  No date: pyloristenosis  9/21/2018: RADIOFREQUENCY ABLATION OF LUMBAR MEDIAL BRANCH NERVE AT   SINGLE LEVEL; Bilateral      Comment:  Procedure: RADIOFREQUENCY ABLATION, NERVE, SPINAL,                LUMBAR, MEDIAL BRANCH, 1 LEVEL;  Surgeon: Galo Clancy MD;               [facility];  Service: Pain Management;                 Laterality: Bilateral;  L3, 4, 5  2/19/2019: RADIOFREQUENCY ABLATION OF LUMBAR MEDIAL BRANCH NERVE AT   SINGLE LEVEL; Bilateral      Comment:  Procedure: Radiofrequency Ablation, Nerve, Spinal,                Lumbar, Medial Branch, 1 Level;  Surgeon: Galo Clancy MD;               Location: [facility] OR;  Service: Pain Management;                 Laterality: Bilateral;  L3, 4, 5   3/10/2020: RADIOFREQUENCY ABLATION OF LUMBAR MEDIAL BRANCH NERVE AT   SINGLE LEVEL; Bilateral      Comment:  Procedure: Radiofrequency Ablation, Nerve, Spinal,                Lumbar, Medial Branch, 1 Level;  Surgeon: Galo Clancy MD;               [facility];  Service: Pain Management;                 Laterality: Bilateral;  L3,4,5 - Burned at 80 degrees C.                for 60 seconds x 2 each site    9/11/2020: RADIOFREQUENCY ABLATION OF LUMBAR MEDIAL BRANCH NERVE AT    SINGLE LEVEL; Bilateral      Comment:  Procedure: Radiofrequency Ablation, Nerve, Spinal,                Lumbar, Medial Branch, 1 Level;  Surgeon: Galo Clancy MD;               Location: [facility] OR;  Service: Pain Management;                 Laterality: Bilateral;  L3, 4, 5 - Burned at 80 degrees                C. for 60 seconds x 2 each site  5/28/2021: RADIOFREQUENCY ABLATION OF LUMBAR MEDIAL BRANCH NERVE AT   SINGLE LEVEL; Bilateral      Comment:  Procedure: Radiofrequency Ablation, Nerve, Spinal,                Lumbar, Medial Branch, 1 Level;  Surgeon: Galo Clancy MD;               Location: [facility] OR;  Service: Pain Management;                 Laterality: Bilateral;  L3,4,5  7/3/2018: RADIOFREQUENCY THERMAL COAGULATION OF MEDIAL BRANCH OF   POSTERIOR RAMUS OF CERVICAL SPINAL NERVE; Right      Comment:  Procedure: RADIOFREQUENCY THERMAL COAGULATION, NERVE,                SPINAL, CERVICAL, MEDIAL BRANCH OF POSTERIOR RAMUS;                 Surgeon: Galo Clancy MD;  Location: [facility] OR;  Service:                Pain Management;  Laterality: Right;  C4,5,6 - Burned at                80 degrees C. for 75 seconds each site  7/23/2019: RADIOFREQUENCY THERMAL COAGULATION OF MEDIAL BRANCH OF   POSTERIOR RAMUS OF CERVICAL SPINAL NERVE; Right      Comment:  Procedure: RADIOFREQUENCY THERMAL COAGULATION, NERVE,                SPINAL, CERVICAL, POSTERIOR RAMUS, MEDIAL BRANCH;                 Surgeon: Galo Clancy MD;  Location: [facility] OR;  Service:                Pain Management;  Laterality: Right;  C4,5,6  6/23/2020: RADIOFREQUENCY THERMAL COAGULATION OF MEDIAL BRANCH OF   POSTERIOR RAMUS OF CERVICAL SPINAL NERVE; Right      Comment:  Procedure: RADIOFREQUENCY THERMAL COAGULATION, NERVE,                SPINAL, CERVICAL, POSTERIOR RAMUS, MEDIAL BRANCH;                 Surgeon: Galo Clancy MD;  Location: [facility] OR;  Service:                Pain Management;  Laterality: Right;  C4, 5, 6  9/10/2019: RADIOFREQUENCY THERMOCOAGULATION;  Bilateral      Comment:  Procedure: RADIOFREQUENCY THERMAL COAGULATION LUMBAR;                 Surgeon: Galo Clancy MD;  Location: [facility] OR;  Service:                Pain Management;  Laterality: Bilateral;  L3,4,5 - Burned               at 80 degrees C. for 60 seconds x 2 each site  No date: skin cancer removal   No date: TONSILLECTOMY    Review of patient's family history indicates:  Problem: Arthritis      Relation: Mother          Age of Onset: (Not Specified)  Problem: Asthma      Relation: Mother          Age of Onset: (Not Specified)  Problem: Rheum arthritis      Relation: Mother          Age of Onset: (Not Specified)  Problem: Pneumonia      Relation: Mother          Age of Onset: (Not Specified)  Problem: Skin cancer      Relation: Mother          Age of Onset: (Not Specified)          Comment: unsure of type  Problem: Heart disease      Relation: Father          Age of Onset: (Not Specified)  Problem: Ulcers      Relation: Father          Age of Onset: (Not Specified)  Problem: Alzheimer's disease      Relation: Maternal Uncle          Age of Onset: (Not Specified)  Problem: Rheum arthritis      Relation: Maternal Grandmother          Age of Onset: (Not Specified)  Problem: Emphysema      Relation: Maternal Grandfather          Age of Onset: (Not Specified)  Problem: Cancer      Relation: Maternal Grandfather          Age of Onset: (Not Specified)          Comment: kidney  Problem: Kidney disease      Relation: Maternal Grandfather          Age of Onset: (Not Specified)  Problem: Cancer      Relation: Paternal Grandmother          Age of Onset: (Not Specified)          Comment: lung= smoker  Problem: Pneumonia      Relation: Paternal Grandfather          Age of Onset: (Not Specified)  Problem: Depression      Relation: Son          Age of Onset: (Not Specified)  Problem: Breast cancer      Relation: Neg Hx          Age of Onset: (Not Specified)  Problem: Ovarian cancer      Relation: Neg Hx          Age of  Onset: (Not Specified)    Social History    Tobacco Use      Smoking status: Never      Smokeless tobacco: Never    Alcohol use: No    Drug use: No    Current Outpatient Medications on File Prior to Visit:  apixaban (ELIQUIS) 5 mg Tab, TAKE 1 TABLET (5 MG TOTAL) BY MOUTH 2 (TWO) TIMES DAILY., Disp: 60 tablet, Rfl: 0  aspirin 81 MG Chew, Take 81 mg by mouth once daily., Disp: , Rfl:   atropine 1% (ISOPTO ATROPINE) 1 % Drop, PLACE 1 DROP INTO THE RIGHT EYE 2 TIMES A DAY., Disp: 5 mL, Rfl: 6  busPIRone (BUSPAR) 10 MG tablet, Take 1 tablet (10 mg total) by mouth 3 (three) times daily., Disp: 90 tablet, Rfl: 2  dorzolamide-timolol 2-0.5% (COSOPT) 22.3-6.8 mg/mL ophthalmic solution, 1 drop into affected eye Ophthalmic Twice a day, Disp: , Rfl:   latanoprost 0.005 % ophthalmic solution, Place 1 drop into both eyes once daily., Disp: 2.5 mL, Rfl: 11  LORazepam (ATIVAN) 0.5 MG tablet, Take 1 tablet (0.5 mg total) by mouth daily as needed for Anxiety., Disp: 30 tablet, Rfl: 0  mirtazapine (REMERON) 7.5 MG Tab, Take 1 tablet (7.5 mg total) by mouth every evening., Disp: 90 tablet, Rfl: 0  pantoprazole (PROTONIX) 40 MG tablet, Take 1 tablet (40 mg total) by mouth once daily., Disp: 90 tablet, Rfl: 3  propranoloL (INDERAL) 40 MG tablet, Take 1 tablet (40 mg total) by mouth 2 (two) times daily., Disp: 60 tablet, Rfl: 11  triamcinolone acetonide 0.1% (KENALOG) 0.1 % cream, AAA back bid PRN rash, Disp: 80 g, Rfl: 3  vortioxetine (TRINTELLIX) 20 mg Tab, Take 1 tablet (20 mg total) by mouth Daily., Disp: 30 tablet, Rfl: 3  [DISCONTINUED] brimonidine 0.2% (ALPHAGAN) 0.2 % Drop, Place 1 drop into the right eye 3 (three) times daily., Disp: 5 mL, Rfl: 3    Current Facility-Administered Medications on File Prior to Visit:  bupivacaine (PF) 0.25% (2.5 mg/ml) injection, , , PRN, Galo Clancy MD, 6 mL at 02/19/19 1314  lidocaine (PF) 10 mg/ml (1%) injection, , , PRN, Galo Clancy MD, 9 mL at 02/19/19 1304  lidocaine (PF) 20 mg/ml (2%)  injection, , , PRN, Galo Clancy MD, 6 mL at 02/19/19 1310  methylPREDNISolone acetate injection, , , PRN, Galo Clancy MD, 80 mg at 02/19/19 1314            Review of Systems   Constitutional:  Negative for activity change, appetite change, chills, fever and unexpected weight change.   HENT:  Positive for congestion, postnasal drip and rhinorrhea. Negative for sinus pressure and sinus pain.    Eyes:  Negative for redness and visual disturbance.   Respiratory:  Negative for chest tightness, shortness of breath and wheezing.    Cardiovascular:  Negative for chest pain, palpitations and leg swelling.   Gastrointestinal:  Negative for abdominal pain, blood in stool, constipation, diarrhea, nausea and vomiting.   Genitourinary:  Negative for dysuria, frequency and hematuria.   Musculoskeletal:  Positive for arthralgias. Negative for myalgias.   Skin:  Negative for pallor and rash.   Neurological:  Positive for numbness (She complains of tingling in the feet). Negative for dizziness, light-headedness and headaches.   Psychiatric/Behavioral:  Positive for dysphoric mood and sleep disturbance. The patient is nervous/anxious.        Objective:      Physical Exam  Vitals and nursing note reviewed.   Constitutional:       General: She is not in acute distress.     Appearance: Normal appearance. She is well-developed. She is obese. She is not ill-appearing, toxic-appearing or diaphoretic.      Comments: Good blood pressure control   Normal pulse with regular rhythm   Underweight with a BMI of 17.3 but she is up 6.6 lb from February 23, 2023   HENT:      Head: Normocephalic and atraumatic.      Right Ear: Tympanic membrane, ear canal and external ear normal. There is no impacted cerumen.      Left Ear: Tympanic membrane, ear canal and external ear normal. There is no impacted cerumen.      Nose: Congestion and rhinorrhea present.      Mouth/Throat:      Mouth: Mucous membranes are moist.      Pharynx: Oropharynx is clear. No  oropharyngeal exudate or posterior oropharyngeal erythema.   Eyes:      General: No scleral icterus.        Right eye: No discharge.         Left eye: No discharge.      Extraocular Movements: Extraocular movements intact.      Conjunctiva/sclera: Conjunctivae normal.      Pupils: Pupils are equal, round, and reactive to light.   Neck:      Thyroid: No thyromegaly.      Vascular: No carotid bruit or JVD.   Cardiovascular:      Rate and Rhythm: Normal rate and regular rhythm.      Pulses:           Dorsalis pedis pulses are 2+ on the right side and 2+ on the left side.        Posterior tibial pulses are 2+ on the right side and 2+ on the left side.      Heart sounds: Normal heart sounds. No murmur heard.     No friction rub. No gallop.   Pulmonary:      Effort: Pulmonary effort is normal. No respiratory distress.      Breath sounds: Normal breath sounds. No stridor. No wheezing, rhonchi or rales.   Chest:      Chest wall: No tenderness.   Abdominal:      General: Bowel sounds are normal. There is no distension.      Palpations: Abdomen is soft. There is no mass.      Tenderness: There is no abdominal tenderness. There is no guarding or rebound.      Hernia: No hernia is present.   Genitourinary:     Vagina: Normal.   Musculoskeletal:         General: No swelling, tenderness, deformity or signs of injury. Normal range of motion.      Cervical back: Normal range of motion and neck supple. No rigidity or tenderness.      Right lower leg: No edema.      Left lower leg: No edema.      Right foot: Normal range of motion. No deformity, bunion, Charcot foot or foot drop.      Left foot: Normal range of motion. No deformity, bunion, Charcot foot, foot drop or prominent metatarsal heads.   Feet:      Right foot:      Protective Sensation: 9 sites tested.  9 sites sensed.      Skin integrity: Skin integrity normal. No ulcer, blister, skin breakdown, erythema, warmth, callus, dry skin or fissure.      Toenail Condition: Right  toenails are normal.      Left foot:      Protective Sensation: 9 sites tested.  9 sites sensed.      Skin integrity: Skin integrity normal. No ulcer, blister, skin breakdown, erythema, warmth, callus, dry skin or fissure.      Toenail Condition: Left toenails are normal.   Lymphadenopathy:      Cervical: No cervical adenopathy.   Skin:     General: Skin is warm and dry.      Capillary Refill: Capillary refill takes less than 2 seconds.      Coloration: Skin is not jaundiced or pale.      Findings: No bruising, erythema, lesion or rash.   Neurological:      General: No focal deficit present.      Mental Status: She is alert and oriented to person, place, and time. Mental status is at baseline.      Cranial Nerves: No cranial nerve deficit.      Sensory: No sensory deficit.      Motor: No weakness.      Coordination: Coordination normal.      Gait: Gait normal.      Deep Tendon Reflexes: Reflexes are normal and symmetric. Reflexes normal.      Comments: Proprioception intact all 10 toes   Psychiatric:         Mood and Affect: Mood normal.         Behavior: Behavior normal.         Thought Content: Thought content normal.         Judgment: Judgment normal.         Assessment:       1. Encounter for preventive health examination    2. JOSE (generalized anxiety disorder)    3. Benzodiazepine dependence, Ativan 0.5 mg daily since 2021    4. Chronic rhinitis    5. Cognitive impairment    6. Moderate episode of recurrent major depressive disorder    7. Primary open angle glaucoma (POAG) of both eyes, indeterminate stage    8. Mild intermittent asthma without complication    9. Paroxysmal atrial fibrillation, ablations X 3 1995, 1997, 9/2017, CHADS-VAS score 5, HAS-BLED score 5     10. Pulmonary hypertension, without RV dysfunction    11. Primary hypertension    12. Pure hypercholesterolemia    13. Chronic diastolic heart failure, 2014    14. Cardiac pacemaker in situ, [employer], dual chamber, 10/14/2021    15. NILSA on  CPAP, using 53% to 70%    16. Frail elderly    17. Interstitial lung disease    18. Anorexia nervosa    19. Neuropathy    20. Moderate protein-calorie malnutrition        Plan:       1. Encounter for preventive health examination  CMP  Sodium   Date Value Ref Range Status   10/29/2023 140 136 - 145 mmol/L Final     Potassium   Date Value Ref Range Status   10/29/2023 3.7 3.5 - 5.1 mmol/L Final     Chloride   Date Value Ref Range Status   10/29/2023 109 95 - 110 mmol/L Final     CO2   Date Value Ref Range Status   10/29/2023 19 (L) 23 - 29 mmol/L Final     Glucose   Date Value Ref Range Status   10/29/2023 92 70 - 110 mg/dL Final     BUN   Date Value Ref Range Status   10/29/2023 13 8 - 23 mg/dL Final     Creatinine   Date Value Ref Range Status   10/29/2023 0.8 0.5 - 1.4 mg/dL Final   09/24/2012 0.6 0.2 - 1.4 mg/dl Final     Calcium   Date Value Ref Range Status   10/29/2023 9.7 8.7 - 10.5 mg/dL Final   09/24/2012 9.9 8.6 - 10.2 mg/dl Final     Total Protein   Date Value Ref Range Status   10/29/2023 6.8 6.0 - 8.4 g/dL Final     Albumin   Date Value Ref Range Status   10/29/2023 3.8 3.5 - 5.2 g/dL Final     Total Bilirubin   Date Value Ref Range Status   10/29/2023 0.9 0.1 - 1.0 mg/dL Final     Comment:     For infants and newborns, interpretation of results should be based  on gestational age, weight and in agreement with clinical  observations.    Premature Infant recommended reference ranges:  Up to 24 hours.............<8.0 mg/dL  Up to 48 hours............<12.0 mg/dL  3-5 days..................<15.0 mg/dL  6-29 days.................<15.0 mg/dL       Alkaline Phosphatase   Date Value Ref Range Status   10/29/2023 70 55 - 135 U/L Final   09/24/2012 63 23 - 119 UNIT/L Final     AST   Date Value Ref Range Status   10/29/2023 23 10 - 40 U/L Final   09/24/2012 26 10 - 30 UNIT/L Final     ALT   Date Value Ref Range Status   10/29/2023 16 10 - 44 U/L Final     Anion Gap   Date Value Ref Range Status   10/29/2023 12 8 -  16 mmol/L Final   09/24/2012 12 5 - 15 meq/L Final     eGFR   Date Value Ref Range Status   10/29/2023 >60 >60 mL/min/1.73 m^2 Final     Lab Results   Component Value Date    WBC 8.84 10/29/2023    HGB 14.3 10/29/2023    HCT 42.1 10/29/2023    MCV 94 10/29/2023     10/29/2023       Generally satisfactory labs    2. JOSE (generalized anxiety disorder)  Stable followed by Psychiatry    3. Benzodiazepine dependence, Ativan 0.5 mg daily since 2021  Stable, followed by Psychiatry    4. Chronic rhinitis  Trial Astelin nasal spray  - azelastine (ASTELIN) 137 mcg (0.1 %) nasal spray; 1 spray (137 mcg total) by Nasal route 2 (two) times daily.  Dispense: 30 mL; Refill: 5    5. Cognitive impairment  Stable, followed by Psychiatry    6. Moderate episode of recurrent major depressive disorder  Stable, followed by Psychiatry    7. Primary open angle glaucoma (POAG) of both eyes, indeterminate stage  Stable, followed by Ophthalmology    8. Mild intermittent asthma without complication  Stable, followed by Pulmonary    9. Paroxysmal atrial fibrillation, ablations X 3 1995, 1997, 9/2017, CHADS-VAS score 5, HAS-BLED score 5   Currently sinus rhythm followed by Cardiology    10. Pulmonary hypertension, without RV dysfunction  Asymptomatic and stable followed by Pulmonary and Cardiology    11. Primary hypertension  Well controlled with no medications other than her propranolol 40 mg b.i.d..  Followed by Cardiology as well    12. Pure hypercholesterolemia  Lab Results   Component Value Date    CHOL 218 (H) 10/25/2023    CHOL 242 (H) 10/05/2023    CHOL 146 03/01/2023     Lab Results   Component Value Date    HDL 74 10/25/2023    HDL 81 (H) 10/05/2023    HDL 60 03/01/2023     Lab Results   Component Value Date    LDLCALC 129.0 10/25/2023    LDLCALC 142.4 10/05/2023    LDLCALC 73.4 03/01/2023     Lab Results   Component Value Date    TRIG 75 10/25/2023    TRIG 93 10/05/2023    TRIG 63 03/01/2023       Lab Results   Component Value  Date    CHOLHDL 33.9 10/25/2023    CHOLHDL 33.5 10/05/2023    CHOLHDL 41.1 03/01/2023    Mildly elevated, patient underweight    13. Chronic diastolic heart failure, 2014  Asymptomatic, followed by Cardiology    14. Cardiac pacemaker in situ, [employer], dual chamber, 10/14/2021  Stable, followed by Cardiology    15. NILSA on CPAP, using 53% to 70%  Stable and doing well uses CPAP most of the night.  Followed by Pulmonary    16. Frail elderly  Somewhat improved    17. Interstitial lung disease  Stable followed by Pulmonary    18. Anorexia nervosa  Improving, followed by Psychiatry    19. Neuropathy  Mildly symptomatic, testing unremarkable.  May be related to her underweight status    20. Moderate protein-calorie malnutrition  Underweight with BMI of 17.3 but improving

## 2024-03-12 ENCOUNTER — PATIENT MESSAGE (OUTPATIENT)
Dept: FAMILY MEDICINE | Facility: CLINIC | Age: 79
End: 2024-03-12
Payer: MEDICARE

## 2024-03-12 NOTE — TELEPHONE ENCOUNTER
She qualifies for the lower dose of Eliquis based on her age over 78 and her weight less than 60 kilos.  I would reduce the Eliquis to 2.5 mg twice a day.  If he has a large supply of the 5 mg he can cut them in half, if he is close to running out we can send in the 2.5 mg tablets.  If after a few days the bleeding problem is not improving we may need to reduce the aspirin as well, I would suggest going to every other day use if that is necessary.  He should know within 2 to 3 days if the reduction in dose is going to help.    Patient notified by e-mail

## 2024-03-19 DIAGNOSIS — K21.9 GASTROESOPHAGEAL REFLUX DISEASE WITHOUT ESOPHAGITIS: ICD-10-CM

## 2024-03-19 RX ORDER — PANTOPRAZOLE SODIUM 40 MG/1
40 TABLET, DELAYED RELEASE ORAL DAILY
Qty: 90 TABLET | Refills: 3 | Status: SHIPPED | OUTPATIENT
Start: 2024-03-19 | End: 2025-03-19

## 2024-03-19 NOTE — TELEPHONE ENCOUNTER
No care due was identified.  [facility] Embedded Care Due Messages. Reference number: [id number].   3/19/2024 12:16:56 AM CDT

## 2024-03-19 NOTE — TELEPHONE ENCOUNTER
Refill Decision Note   Ayla Dela Cruz  is requesting a refill authorization.  Brief Assessment and Rationale for Refill:  Approve     Medication Therapy Plan:         Comments:     Note composed:4:51 PM 03/19/2024

## 2024-03-25 ENCOUNTER — PATIENT MESSAGE (OUTPATIENT)
Dept: FAMILY MEDICINE | Facility: CLINIC | Age: 79
End: 2024-03-25
Payer: MEDICARE

## 2024-03-25 NOTE — PROGRESS NOTES
aSubjective:      Cardiologist: Barrett Jurado MD    I had the pleasure of seeing Ayla Dela Cruz in follow up for her history of atrial arrhythmias and PVI. She last saw me in 3/2023. She is a 77 year old female with HTN, HLD, and TIA in 2014, whose history of arrhythmias dates back to her 30s when she began to experience sudden onset palpitations. She was started on Tambacor at that time, which improved her symptoms. She was started on Coumadin at age 55 years. In the mid-1990s, she underwent an ablation for what sounds like atrial flutter in [place], [place]. In 1997 she underwent a second ablation which she was told was unsuccessful. Since that time she has undergone two electrical cardioversions, once in the mid-2000s (which lasted 5 years), and another around 2010. Around 2014 her arrhythmias recurred around the time she had her TIA, for which she was placed on Xarelto.    I reviewed all ECGs in the EMR at her initial office visit. ECGs from [id number] showed sinus rhythm. ECGs from 1/2014 and 4/2014 showed AF. ECGs between 6/2014 and 1/2016 showed sinus bradycardia and NSR. All ECGs between 1/16/2016 and 5/2017 showed either AF or AFL. When in AFL, RVR was generally seen.    In 9/2017, a PVI was performed. All four PVs were reconnected from a prior PVI, and successfully reisolated. A septal MA flutter line was also created due to frequent inductions during the case. The existing CTI-line was found to be intact. She was discharged on Amiodarone 100 mg daily. At her 11/2017 follow-up, Ms. Dela Cruz was feeling well apart from occasional AVILA relieved with lasix. Her energy level had vastly improved following ablation. Amiodarone was stopped at that visit (I was concerned it was causing her intermittent AVILA). Stopping Amiodarone improved her symptoms. A 48 hour Holter monitor performed in 11/2017 showed sinus rhythm with an average HR of 58 bpm.     At her 2/2018 visit Ms. Dela Cruz continued to feel well,  with no complaints. However, beginning in early 8/2018, Ms. Dela Cruz began to note significant AVILA, lightheadedness, and dizziness, which temporally correlated with her starting Tramadol. She had been off this medication for a few days. She also cut losartan from 100 mg to 50 mg once daily on her own. At her 9/2018 visit I reviewed recent HR and BP trends, with SBPs frequently in the 80-90s mmHg range. At that visit I stopped losartan. A 48 hour Holter monitor showed sinus rhythm with an average HR of 63 bpm, with a 6.6% PAC burden.     At her visit in 11/2018, Ms. Dela Cruz had an episode of near syncope in [facility], which resulted in a right ankle break for which she required surgery. Otherwise she was feeling much better, with improved breathing and lightheadedness.    At her 1/2020 visit, she described multiple issues with NOACs. She specifically described pruritis on Xarelto, and pruritis on Eliquis. At the time she was on Pradaxa and tolerating it. Ms. 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    At her 11/2020 visit Ms. Dela Cruz was tolerating pradaxa, and denied GI bleeding events. She had irregular heartbeats on occasion but not palpitations. At her request, pradaxa was switched back to eliquis.     ECGs from earlier in 2021 show profound sinus bradycardia with occasional extrasystoles and compensatory pauses (beta blocker has since been down-titrated). In 9/2021 Ms. Dela Cruz presented to [facility] with palpitations and skipped beats. The initial ECG was consistent with type 1 second-degree AVB with occasional extraystoles, the second consistent with a paroxysms of atypical flutter. ECGs associated with a negative NST also shows periods consistent with atypical flutter.    At her last visit in 9/2021 we discussed options including re-do PVI vs [employer] dual chamber pacemaker implantation and sotalol initiation. She chose the latter,  which was performed in 10/2021. She was not started on an antiarrhythmic post-procedure.    At her 3/2022 visit, a device check showed 10 AMS episodes (longest 19 minutes), AF burden <1%. Plan at that time was to start sotalol if AF burden began to increase. At 3/2023 visit device check showed 18 AMS episodes (longest 2 minutes). Plan was to hold the course.    I reviewed today's device interrogation which shows stable device/lead function, RA pacing 64%, RV pacing 7.2%, 8 AMS episodes since 11/2023 (longest 1 minute), 7-8.5 years.     Review of Systems   Constitutional: Negative for decreased appetite, malaise/fatigue, weight gain and weight loss.   HENT:  Negative for sore throat.    Eyes:  Negative for blurred vision.   Cardiovascular:  Negative for chest pain, dyspnea on exertion, irregular heartbeat, leg swelling, near-syncope, orthopnea, palpitations, paroxysmal nocturnal dyspnea and syncope.   Respiratory:  Negative for shortness of breath.    Skin:  Negative for rash.   Musculoskeletal:  Negative for arthritis.   Gastrointestinal:  Negative for abdominal pain.   Neurological:  Negative for focal weakness and light-headedness.   Psychiatric/Behavioral:  Negative for altered mental status.         Objective:    Physical Exam  Vitals reviewed.   Constitutional:       General: She is not in acute distress.     Appearance: She is well-developed.   HENT:      Head: Normocephalic and atraumatic.   Eyes:      General: No scleral icterus.     Conjunctiva/sclera: Conjunctivae normal.      Pupils: Pupils are equal, round, and reactive to light.   Neck:      Thyroid: No thyromegaly.      Vascular: No JVD.   Cardiovascular:      Rate and Rhythm: Normal rate and regular rhythm.      Pulses: Intact distal pulses.      Heart sounds: Normal heart sounds. No murmur heard.     No friction rub. No gallop.   Pulmonary:      Effort: Pulmonary effort is normal. No respiratory distress.      Breath sounds: Normal breath sounds. No  rales.   Abdominal:      General: Bowel sounds are normal. There is no distension.      Palpations: Abdomen is soft.   Musculoskeletal:      Cervical back: Normal range of motion and neck supple.   Skin:     General: Skin is warm and dry.   Neurological:      Mental Status: She is alert and oriented to person, place, and time.   Psychiatric:         Behavior: Behavior normal.           Assessment:       1. H/O: CVA (cerebrovascular accident), June 2014    2. Paroxysmal atrial fibrillation, ablations X 3 1995, 1997, 9/2017, CHADS-VAS score 5, HAS-BLED score 5     3. Primary hypertension    4. Mixed hyperlipidemia    5. S/P ablation of atrial flutter    6. Second degree AV block, Mobitz type I    7. Atrial fibrillation, unspecified type         Plan:     In summary, Ayla Dela Cruz is a 76 year old female with a longstanding history of atrial arrhythmias and prior ablations at outside institutions. Her [id number] Score is 5 (age, female, TIA, HTN) so she should remain on anticoagulation indefinitely (eliquis 2.5 mg bid). She is now 4 years post-PVI and MA flutter line, and was maintaining sinus rhythm off Amiodarone until 9/2021. She is now status-post dual chamber pacemaker implantation. AMS burden <1%    Plan is to hold the course and see me again in 1 year. Should she have increased AF burden the plan will be to start sotalol 40 mg bid.    Ms. Dela Cruz had a likely cardioembolic event after stopping AC for a week. 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     Thank you for allowing me to participate in the care of this patient. Please do not hesitate to call me with any questions or concerns.

## 2024-03-27 ENCOUNTER — OFFICE VISIT (OUTPATIENT)
Dept: CARDIOLOGY | Facility: CLINIC | Age: 79
End: 2024-03-27
Payer: MEDICARE

## 2024-03-27 ENCOUNTER — HOSPITAL ENCOUNTER (OUTPATIENT)
Dept: CARDIOLOGY | Facility: CLINIC | Age: 79
Discharge: HOME | End: 2024-03-27
Attending: INTERNAL MEDICINE
Payer: MEDICARE

## 2024-03-27 VITALS
OXYGEN SATURATION: 98 % | SYSTOLIC BLOOD PRESSURE: 122 MMHG | HEART RATE: 70 BPM | WEIGHT: 111.69 LBS | DIASTOLIC BLOOD PRESSURE: 68 MMHG | BODY MASS INDEX: 17.53 KG/M2 | RESPIRATION RATE: 16 BRPM | HEIGHT: 67 IN

## 2024-03-27 DIAGNOSIS — I44.1 SECOND DEGREE AV BLOCK, MOBITZ TYPE I: ICD-10-CM

## 2024-03-27 DIAGNOSIS — I48.0 PAROXYSMAL ATRIAL FIBRILLATION: ICD-10-CM

## 2024-03-27 DIAGNOSIS — Z95.0 PRESENCE OF CARDIAC PACEMAKER: ICD-10-CM

## 2024-03-27 DIAGNOSIS — I10 PRIMARY HYPERTENSION: ICD-10-CM

## 2024-03-27 DIAGNOSIS — Z98.890 S/P ABLATION OF ATRIAL FLUTTER: ICD-10-CM

## 2024-03-27 DIAGNOSIS — I48.91 ATRIAL FIBRILLATION, UNSPECIFIED TYPE: ICD-10-CM

## 2024-03-27 DIAGNOSIS — Z86.79 S/P ABLATION OF ATRIAL FLUTTER: ICD-10-CM

## 2024-03-27 DIAGNOSIS — E78.2 MIXED HYPERLIPIDEMIA: ICD-10-CM

## 2024-03-27 DIAGNOSIS — Z86.73 H/O: CVA (CEREBROVASCULAR ACCIDENT): Primary | ICD-10-CM

## 2024-03-27 DIAGNOSIS — I50.32 CHRONIC DIASTOLIC HEART FAILURE: ICD-10-CM

## 2024-03-27 PROCEDURE — 3288F FALL RISK ASSESSMENT DOCD: CPT | Mod: CPTII,S$GLB,, | Performed by: INTERNAL MEDICINE

## 2024-03-27 PROCEDURE — 1159F MED LIST DOCD IN RCRD: CPT | Mod: CPTII,S$GLB,, | Performed by: INTERNAL MEDICINE

## 2024-03-27 PROCEDURE — 99999 PR PBB SHADOW E&M-EST. PATIENT-LVL III: CPT | Mod: PBBFAC,,, | Performed by: INTERNAL MEDICINE

## 2024-03-27 PROCEDURE — 3078F DIAST BP <80 MM HG: CPT | Mod: CPTII,S$GLB,, | Performed by: INTERNAL MEDICINE

## 2024-03-27 PROCEDURE — 3074F SYST BP LT 130 MM HG: CPT | Mod: CPTII,S$GLB,, | Performed by: INTERNAL MEDICINE

## 2024-03-27 PROCEDURE — 1157F ADVNC CARE PLAN IN RCRD: CPT | Mod: CPTII,S$GLB,, | Performed by: INTERNAL MEDICINE

## 2024-03-27 PROCEDURE — 1101F PT FALLS ASSESS-DOCD LE1/YR: CPT | Mod: CPTII,S$GLB,, | Performed by: INTERNAL MEDICINE

## 2024-03-27 PROCEDURE — 99214 OFFICE O/P EST MOD 30 MIN: CPT | Mod: S$GLB,,, | Performed by: INTERNAL MEDICINE

## 2024-03-27 PROCEDURE — 93288 INTERROG EVL PM/LDLS PM IP: CPT | Mod: ,,, | Performed by: INTERNAL MEDICINE

## 2024-03-28 LAB
BATTERY VOLTAGE (V): 3.01 V
IMPEDANCE RA LEAD (NATIVE): 430 OHMS
IMPEDANCE RA LEAD: 480 OHMS
P/R-WAVE RA LEAD (NATIVE): 8.5 MV
P/R-WAVE RA LEAD: 2.1 MV
THRESHOLD MS RA LEAD (NATIVE): 0.4 MS
THRESHOLD MS RA LEAD: 0.4 MS
THRESHOLD V RA LEAD (NATIVE): 1 V
THRESHOLD V RA LEAD: 0.75 V

## 2024-04-03 ENCOUNTER — CLINICAL SUPPORT (OUTPATIENT)
Dept: CARDIOLOGY | Facility: HOSPITAL | Age: 79
End: 2024-04-03
Payer: MEDICARE

## 2024-04-03 ENCOUNTER — CLINICAL SUPPORT (OUTPATIENT)
Dept: CARDIOLOGY | Facility: HOSPITAL | Age: 79
End: 2024-04-03
Attending: INTERNAL MEDICINE
Payer: MEDICARE

## 2024-04-03 DIAGNOSIS — Z95.0 PRESENCE OF CARDIAC PACEMAKER: ICD-10-CM

## 2024-04-03 DIAGNOSIS — I48.91 UNSPECIFIED ATRIAL FIBRILLATION: ICD-10-CM

## 2024-04-03 PROCEDURE — 93296 REM INTERROG EVL PM/IDS: CPT | Performed by: INTERNAL MEDICINE

## 2024-04-03 PROCEDURE — 93294 REM INTERROG EVL PM/LDLS PM: CPT | Mod: ,,, | Performed by: INTERNAL MEDICINE

## 2024-04-15 RX ORDER — BUSPIRONE HYDROCHLORIDE 10 MG/1
10 TABLET ORAL 3 TIMES DAILY
Qty: 270 TABLET | Refills: 0 | Status: SHIPPED | OUTPATIENT
Start: 2024-04-15

## 2024-04-16 LAB
OHS CV AF BURDEN PERCENT: < 1
OHS CV DC REMOTE DEVICE TYPE: NORMAL
OHS CV RV PACING PERCENT: 7.9 %

## 2024-04-16 NOTE — PROGRESS NOTES
Outpatient Psychiatry Follow-Up Visit    Clinical Status of Patient: Outpatient (Ambulatory)  04/16/2024     Chief Complaint: 78 y/o female presenting today with  for a follow-up.       Interval History and Content of Current Session:  Interim Events/Subjective Report/Content of Current Session: 78 y/o female follow-up appointment.    Pt is a 78 y/o female with past psychiatric hx of depression, anxiety, eating disorder who presents for follow-up treatment. Pt reported that she is doing well. Anxiety in the morning has decreased. Has not used lorazepam for 5 days.  notes some concern with short-term memory. Weight has increased. Sleeping well. Continues to read and be active.    Past Psychiatric hx: remeron, rexulti, vraylar, Limbitrol, venlafaxine, fluoxetine, Abilify, gabapentin, amitriptyline, buspirone, cannot remember others.     Past Medical hx:   Past Medical History:   Diagnosis Date    Anticoagulant long-term use     Anxiety     Arthritis     Atrial fibrillation     Basal cell carcinoma     Cancer     skin    CHF (congestive heart failure)     Depression     DVT (deep venous thrombosis)     GERD (gastroesophageal reflux disease)     Glaucoma (increased eye pressure)     Hyperlipidemia     diet controlled    Hypertension     Interstitial lung disease     Localized hives 01/10/2020    Mild persistent asthma without complication 11/12/2018    Pacemaker     Pneumonia 01/31/2014    Stroke 06/03/2014    Stroke     TIA (transient ischemic attack)     TIA (transient ischemic attack)         Interim hx:  Medication changes last visit: Decrease ativan 0.5 mg PRN  Anxiety: decrease  Depression: decrease     Denies suicidal/homicidal ideations.  Denies hopelessness/worthlessness.    Denies auditory/visual hallucinations      Alcohol: Infrequent use  Drug: Pt denied  Caffeine: Not assessed  Tobacco: Pt denied      Review of Systems   PSYCHIATRIC: Pertinent items are noted in the narrative.         CONSTITUTIONAL: weight stable    Past Medical, Family and Social History: The patient's past medical, family and social history have been reviewed and updated as appropriate within the electronic medical record. See encounter notes.     Current Psychiatric Medication:  ativan 0.5 mg PRN, buspirone 10 mg TID, trintillex 20 mg, mirtazapine 7.5 mg      Compliance: yes      Side effects: Pt denied     Risk Parameters:  Patient reports no suicidal ideation  Patient reports no homicidal ideation  Patient reports no self-injurious behavior  Patient reports no violent behavior     Exam (detailed: at least 9 elements; comprehensive: all 15 elements)   Constitutional  Vitals:  Most recent vital signs, dated less than 90 days prior to this appointment, were reviewed. BP: ()/()   Arterial Line BP: ()/()       General:  unremarkable, age appropriate, casual attire, good eye contact, good rapport       Musculoskeletal  Muscle Strength/Tone:  no flaccidity, no tremor    Gait & Station:  normal      Psychiatric                       Speech:  normal tone, normal rate, rhythm, and volume   Mood & Affect:   Mildly anxious         Thought Process:   Goal directed; Linear    Associations:   intact   Thought Content:   No SI/HI, delusions, or paranoia, no AV/VH   Insight & Judgement:   Good, adequate to circumstances   Orientation:   grossly intact; alert and oriented x 4    Memory:  intact for content of interview    Language:  grossly intact, can repeat    Attention Span  : Grossly intact for content of interview   Fund of Knowledge:   intact and appropriate to age and level of education        Assessment and Diagnosis   Status/Progress: Based on the examination today, the patient's problem(s) is/are adequately controlled.  New problems have not been presented today. Co-morbidities are not complicating management of the primary condition. There are no active rule-out diagnoses for this patient at this time.      Impression: Pt appears  to have decreased anxiety with improved functioning, sleep, and appetite. Gait, verbal and nonverbal responsiveness continue to improve. Has been gaining weight and fear of choking appears to be reducing. Pt is not interested in a referral for neuropsych assessment at this time. Will continue with medication plan and monitor symptoms moving forward.     Diagnosis:   1) Major Depressive Disorder, recurrent, moderate  2) Generalized Anxiety Disorder  3) Specific Phobia  4) Panic Disorder   5) Personality Disorder traits  6) R/o Dementia of unknown etiology  Intervention/Counseling/Treatment Plan   Medication Management:      1. ativan 0.5 mg  PRN    2. buspirone to 10 mg TID    3. trintillex to 20 mg    4. mirtazapine 7.5 mg     5. Call to report any worsening of symptoms or problems with the medication. Pt instructed to go to ER with thoughts of harming self, others     6. Cont in therapy as needed    Psychotherapy: none  Target symptoms: anxiety  Why chosen therapy is appropriate versus another modality: CBT used; relevant to diagnosis, patient responds to this modality  Outcome monitoring methods: self-report, observation  Therapeutic intervention type: Cognitive Behavioral Therapy, Exposure therapy  Topics discussed/themes: building skills sets for symptom management, symptom recognition, nutrition, exercise  The patient's response to the intervention is accepting  Patient's response to treatment is: good.   The patient's progress toward treatment goals: limited to fair     Return to clinic: 3 months    -Cognitive-Behavioral/Supportive therapy and psychoeducation provided  -R/B/SE's of medications discussed with the pt who expresses understanding and chooses to take medications as prescribed.   -Pt instructed to call clinic, 911 or go to nearest emergency room if sxs worsen or pt is in   crisis. The pt expresses understanding.    Galo Lipscomb, PhD, MP     Antidepressant/Antianxiety Medication Initiation:   Patient informed of risks, benefits, and potential side effects of medication and accepts informed consent.  Common side effects include nausea, fatigue, headache, insomnia., Specifically discussed the possibility of new or worsening suicidal thoughts/depression.  Patient instructed to stop the medication immediately and seek urgent treatment if this occurs. Patient instructed not to abruptly discontinue medication without physician guidance except in cases of sudden onset or worsening of SI.       Stimulant Medication Initiation:  Patient advised of risks, benefits, and side effects of medication and accepts informed consent.  Common side effects include insomnia, irritability, jittery feeling, dry mouth, and agitation/hostility., Patient advised of potential addictive nature of medication and controlled substance classification.  Instructed to safeguard medication as no early refills can be given for lost or stolen medications.       Benzodiazepine Initiation:  Patient advised of the risks, benefits, and common side effects of medication and has accepted informed consent.  Common side effects include drowsiness, impaired coordination, possible memory loss., Patient advised NOT to operate a vehicle or machinery untiil they are sure how the medication will affec them.  Client also advised of danger of mixing this medication with alcohol., Patient advised of potential addictive nature of medication and need to safeguard medication as no early refills for lost or stolen medications can be authorized.

## 2024-04-17 ENCOUNTER — OFFICE VISIT (OUTPATIENT)
Dept: PSYCHIATRY | Facility: CLINIC | Age: 79
End: 2024-04-17
Payer: MEDICARE

## 2024-04-17 VITALS
SYSTOLIC BLOOD PRESSURE: 116 MMHG | BODY MASS INDEX: 18.05 KG/M2 | HEART RATE: 60 BPM | WEIGHT: 115 LBS | DIASTOLIC BLOOD PRESSURE: 59 MMHG | HEIGHT: 67 IN

## 2024-04-17 DIAGNOSIS — F33.1 MAJOR DEPRESSIVE DISORDER, RECURRENT, MODERATE: Primary | ICD-10-CM

## 2024-04-17 DIAGNOSIS — F41.1 GENERALIZED ANXIETY DISORDER: ICD-10-CM

## 2024-04-17 DIAGNOSIS — F40.298 SPECIFIC PHOBIA: ICD-10-CM

## 2024-04-17 DIAGNOSIS — F41.0 PANIC ATTACKS: ICD-10-CM

## 2024-04-17 PROCEDURE — 99999 PR PBB SHADOW E&M-EST. PATIENT-LVL III: CPT | Mod: PBBFAC,,, | Performed by: PSYCHOLOGIST

## 2024-04-17 PROCEDURE — 1101F PT FALLS ASSESS-DOCD LE1/YR: CPT | Mod: CPTII,S$GLB,, | Performed by: PSYCHOLOGIST

## 2024-04-17 PROCEDURE — 1157F ADVNC CARE PLAN IN RCRD: CPT | Mod: CPTII,S$GLB,, | Performed by: PSYCHOLOGIST

## 2024-04-17 PROCEDURE — 99214 OFFICE O/P EST MOD 30 MIN: CPT | Mod: S$GLB,,, | Performed by: PSYCHOLOGIST

## 2024-04-17 PROCEDURE — 3074F SYST BP LT 130 MM HG: CPT | Mod: CPTII,S$GLB,, | Performed by: PSYCHOLOGIST

## 2024-04-17 PROCEDURE — 1159F MED LIST DOCD IN RCRD: CPT | Mod: CPTII,S$GLB,, | Performed by: PSYCHOLOGIST

## 2024-04-17 PROCEDURE — 3288F FALL RISK ASSESSMENT DOCD: CPT | Mod: CPTII,S$GLB,, | Performed by: PSYCHOLOGIST

## 2024-04-17 PROCEDURE — 3078F DIAST BP <80 MM HG: CPT | Mod: CPTII,S$GLB,, | Performed by: PSYCHOLOGIST

## 2024-04-17 PROCEDURE — 1126F AMNT PAIN NOTED NONE PRSNT: CPT | Mod: CPTII,S$GLB,, | Performed by: PSYCHOLOGIST

## 2024-04-17 RX ORDER — MIRTAZAPINE 7.5 MG/1
7.5 TABLET, FILM COATED ORAL NIGHTLY
Qty: 90 TABLET | Refills: 0 | Status: SHIPPED | OUTPATIENT
Start: 2024-04-17 | End: 2025-04-17

## 2024-04-17 RX ORDER — VORTIOXETINE 20 MG/1
20 TABLET, FILM COATED ORAL DAILY
Qty: 90 TABLET | Refills: 0 | Status: SHIPPED | OUTPATIENT
Start: 2024-04-17

## 2024-04-25 ENCOUNTER — PATIENT MESSAGE (OUTPATIENT)
Dept: FAMILY MEDICINE | Facility: CLINIC | Age: 79
End: 2024-04-25
Payer: MEDICARE

## 2024-04-25 ENCOUNTER — OFFICE VISIT (OUTPATIENT)
Dept: FAMILY MEDICINE | Facility: CLINIC | Age: 79
End: 2024-04-25
Payer: MEDICARE

## 2024-04-25 VITALS
HEIGHT: 67 IN | BODY MASS INDEX: 17.79 KG/M2 | SYSTOLIC BLOOD PRESSURE: 107 MMHG | TEMPERATURE: 97 F | WEIGHT: 113.31 LBS | OXYGEN SATURATION: 99 % | HEART RATE: 71 BPM | DIASTOLIC BLOOD PRESSURE: 55 MMHG

## 2024-04-25 DIAGNOSIS — R63.6 UNDERWEIGHT: ICD-10-CM

## 2024-04-25 DIAGNOSIS — I10 PRIMARY HYPERTENSION: ICD-10-CM

## 2024-04-25 DIAGNOSIS — I50.32 CHRONIC DIASTOLIC HEART FAILURE: ICD-10-CM

## 2024-04-25 DIAGNOSIS — I70.0 ATHEROSCLEROSIS OF AORTA: ICD-10-CM

## 2024-04-25 DIAGNOSIS — Z00.00 ENCOUNTER FOR PREVENTIVE HEALTH EXAMINATION: Primary | ICD-10-CM

## 2024-04-25 DIAGNOSIS — I48.0 PAROXYSMAL ATRIAL FIBRILLATION: Primary | ICD-10-CM

## 2024-04-25 PROCEDURE — 1123F ACP DISCUSS/DSCN MKR DOCD: CPT | Mod: CPTII,S$GLB,, | Performed by: NURSE PRACTITIONER

## 2024-04-25 PROCEDURE — 1126F AMNT PAIN NOTED NONE PRSNT: CPT | Mod: CPTII,S$GLB,, | Performed by: NURSE PRACTITIONER

## 2024-04-25 PROCEDURE — 3078F DIAST BP <80 MM HG: CPT | Mod: CPTII,S$GLB,, | Performed by: NURSE PRACTITIONER

## 2024-04-25 PROCEDURE — 3288F FALL RISK ASSESSMENT DOCD: CPT | Mod: CPTII,S$GLB,, | Performed by: NURSE PRACTITIONER

## 2024-04-25 PROCEDURE — G0439 PPPS, SUBSEQ VISIT: HCPCS | Mod: S$GLB,,, | Performed by: NURSE PRACTITIONER

## 2024-04-25 PROCEDURE — 1101F PT FALLS ASSESS-DOCD LE1/YR: CPT | Mod: CPTII,S$GLB,, | Performed by: NURSE PRACTITIONER

## 2024-04-25 PROCEDURE — 1159F MED LIST DOCD IN RCRD: CPT | Mod: CPTII,S$GLB,, | Performed by: NURSE PRACTITIONER

## 2024-04-25 PROCEDURE — 1170F FXNL STATUS ASSESSED: CPT | Mod: CPTII,S$GLB,, | Performed by: NURSE PRACTITIONER

## 2024-04-25 PROCEDURE — 3074F SYST BP LT 130 MM HG: CPT | Mod: CPTII,S$GLB,, | Performed by: NURSE PRACTITIONER

## 2024-04-25 PROCEDURE — 99999 PR PBB SHADOW E&M-EST. PATIENT-LVL IV: CPT | Mod: PBBFAC,,, | Performed by: NURSE PRACTITIONER

## 2024-04-25 PROCEDURE — 1160F RVW MEDS BY RX/DR IN RCRD: CPT | Mod: CPTII,S$GLB,, | Performed by: NURSE PRACTITIONER

## 2024-05-07 ENCOUNTER — OFFICE VISIT (OUTPATIENT)
Dept: OPHTHALMOLOGY | Facility: CLINIC | Age: 79
End: 2024-05-07
Payer: MEDICARE

## 2024-05-07 DIAGNOSIS — H20.9 UVEITIS-HYPHEMA-GLAUCOMA SYNDROME OF RIGHT EYE: ICD-10-CM

## 2024-05-07 DIAGNOSIS — H40.1121 PRIMARY OPEN-ANGLE GLAUCOMA, LEFT EYE, MILD STAGE: ICD-10-CM

## 2024-05-07 DIAGNOSIS — H40.1112 PRIMARY OPEN ANGLE GLAUCOMA (POAG) OF RIGHT EYE, MODERATE STAGE: Primary | ICD-10-CM

## 2024-05-07 DIAGNOSIS — T85.398A UVEITIS-HYPHEMA-GLAUCOMA SYNDROME OF RIGHT EYE: ICD-10-CM

## 2024-05-07 DIAGNOSIS — H40.41X0 UVEITIS-HYPHEMA-GLAUCOMA SYNDROME OF RIGHT EYE: ICD-10-CM

## 2024-05-07 PROCEDURE — 1159F MED LIST DOCD IN RCRD: CPT | Mod: CPTII,S$GLB,, | Performed by: OPHTHALMOLOGY

## 2024-05-07 PROCEDURE — 1160F RVW MEDS BY RX/DR IN RCRD: CPT | Mod: CPTII,S$GLB,, | Performed by: OPHTHALMOLOGY

## 2024-05-07 PROCEDURE — 1126F AMNT PAIN NOTED NONE PRSNT: CPT | Mod: CPTII,S$GLB,, | Performed by: OPHTHALMOLOGY

## 2024-05-07 PROCEDURE — 99999 PR PBB SHADOW E&M-EST. PATIENT-LVL II: CPT | Mod: PBBFAC,,, | Performed by: OPHTHALMOLOGY

## 2024-05-07 PROCEDURE — 99213 OFFICE O/P EST LOW 20 MIN: CPT | Mod: S$GLB,,, | Performed by: OPHTHALMOLOGY

## 2024-05-07 PROCEDURE — 3288F FALL RISK ASSESSMENT DOCD: CPT | Mod: CPTII,S$GLB,, | Performed by: OPHTHALMOLOGY

## 2024-05-07 PROCEDURE — 1157F ADVNC CARE PLAN IN RCRD: CPT | Mod: CPTII,S$GLB,, | Performed by: OPHTHALMOLOGY

## 2024-05-07 PROCEDURE — 1101F PT FALLS ASSESS-DOCD LE1/YR: CPT | Mod: CPTII,S$GLB,, | Performed by: OPHTHALMOLOGY

## 2024-05-07 RX ORDER — LATANOPROST 50 UG/ML
1 SOLUTION/ DROPS OPHTHALMIC DAILY
Qty: 2.5 ML | Refills: 11 | Status: SHIPPED | OUTPATIENT
Start: 2024-05-07

## 2024-05-07 NOTE — PROGRESS NOTES
HPI    DLS: 11/27/2023 DFE     Pt states eyes are doing really well. Using older specs to see better out   of. Denies pain/ FOL/ floaters.       Dorzolamide/ timolol OU BID   Atropine OD BID   PF systane OU PRN   Last edited by Naima Lynch on 5/7/2024  1:21 PM.            Assessment /Plan     For exam results, see Encounter Report.    Primary open angle glaucoma (POAG) of right eye, moderate stage    Primary open-angle glaucoma, left eye, mild stage    Uveitis-hyphema-glaucoma syndrome of right eye      1. Primary open angle glaucoma (POAG) of right eye, moderate stage  2. Primary open-angle glaucoma, left eye, mild stage  +famhx  Avg pachy    Tmax 20/18 per outside records  Tmax 30+ OD with hyphema  UGH syndrome and IOL malposition OD  Hx of Hyphema OD x 2, requiring washout, Cornea blood staining  HVF moderate damage OD, mild damage OS  OCT NFL damaged OD, normal OS  Nasal congestion/runny nose with dorz/rm    IOP excellent    Continue  Latan qhs OU    F/u 6 months, HVF, IOP    Visit today is associated with current or anticipated ongoing medical care related to this patients single serious and complex condition, glaucoma.        3. Uveitis-hyphema-glaucoma syndrome of right eye  HX AC washout and surgical iridectomy OD  7/2022  Doing well, AC quiet    Continue atropine BID OD

## 2024-05-28 ENCOUNTER — PATIENT MESSAGE (OUTPATIENT)
Dept: PULMONOLOGY | Facility: CLINIC | Age: 79
End: 2024-05-28
Payer: MEDICARE

## 2024-05-29 ENCOUNTER — OFFICE VISIT (OUTPATIENT)
Dept: PULMONOLOGY | Facility: CLINIC | Age: 79
End: 2024-05-29
Payer: MEDICARE

## 2024-05-29 VITALS
HEIGHT: 67 IN | DIASTOLIC BLOOD PRESSURE: 72 MMHG | HEART RATE: 60 BPM | OXYGEN SATURATION: 93 % | BODY MASS INDEX: 18.79 KG/M2 | WEIGHT: 119.69 LBS | SYSTOLIC BLOOD PRESSURE: 150 MMHG

## 2024-05-29 DIAGNOSIS — J44.1 COPD EXACERBATION: Primary | ICD-10-CM

## 2024-05-29 DIAGNOSIS — R06.00 DYSPNEA, UNSPECIFIED TYPE: ICD-10-CM

## 2024-05-29 PROCEDURE — 3288F FALL RISK ASSESSMENT DOCD: CPT | Mod: CPTII,S$GLB,, | Performed by: INTERNAL MEDICINE

## 2024-05-29 PROCEDURE — 3077F SYST BP >= 140 MM HG: CPT | Mod: CPTII,S$GLB,, | Performed by: INTERNAL MEDICINE

## 2024-05-29 PROCEDURE — 99999 PR PBB SHADOW E&M-EST. PATIENT-LVL III: CPT | Mod: PBBFAC,,, | Performed by: INTERNAL MEDICINE

## 2024-05-29 PROCEDURE — 1101F PT FALLS ASSESS-DOCD LE1/YR: CPT | Mod: CPTII,S$GLB,, | Performed by: INTERNAL MEDICINE

## 2024-05-29 PROCEDURE — 1126F AMNT PAIN NOTED NONE PRSNT: CPT | Mod: CPTII,S$GLB,, | Performed by: INTERNAL MEDICINE

## 2024-05-29 PROCEDURE — 3078F DIAST BP <80 MM HG: CPT | Mod: CPTII,S$GLB,, | Performed by: INTERNAL MEDICINE

## 2024-05-29 PROCEDURE — 1157F ADVNC CARE PLAN IN RCRD: CPT | Mod: CPTII,S$GLB,, | Performed by: INTERNAL MEDICINE

## 2024-05-29 PROCEDURE — 1159F MED LIST DOCD IN RCRD: CPT | Mod: CPTII,S$GLB,, | Performed by: INTERNAL MEDICINE

## 2024-05-29 PROCEDURE — 99214 OFFICE O/P EST MOD 30 MIN: CPT | Mod: S$GLB,,, | Performed by: INTERNAL MEDICINE

## 2024-05-29 RX ORDER — PREDNISONE 20 MG/1
TABLET ORAL
Qty: 12 TABLET | Refills: 0 | Status: SHIPPED | OUTPATIENT
Start: 2024-05-29

## 2024-05-29 RX ORDER — FLUTICASONE FUROATE, UMECLIDINIUM BROMIDE AND VILANTEROL TRIFENATATE 200; 62.5; 25 UG/1; UG/1; UG/1
1 POWDER RESPIRATORY (INHALATION) DAILY
Qty: 60 EACH | Refills: 11 | Status: SHIPPED | OUTPATIENT
Start: 2024-05-29

## 2024-05-29 NOTE — PATIENT INSTRUCTIONS
You may have some worsening of copd.    Use trelegy once daily til out  If copd -- may respond to prednisone --use if trelegy helps but not controlling    You decline beneift from prior inhalers -- xopenex   and           Chest xray 10/2023 had mild increased lung markings, ct 2016 had bilateral pleural effusions-- viewed.  Echo had good function, leaky valves, and pulmonary hypertension.  Do chest xray if not clearing.    Propranolol may worsen lungs -- was tolerated til recently        Check blood work -- cbc, cmp, crp, and bnp- if not clearing..      Prior breathing test did not have optimal effort-- lungs may not be too too bad..

## 2024-05-29 NOTE — PROGRESS NOTES
5/29/2024    Ayla Dela Cruz  Office Note    Chief Complaint   Patient presents with    Shortness of Breath       HPI:     May 29, 2024-- pt having more np cough and some sob develop last  wk.  Morning worse.  Was on trelegy in past..  Uses propranolol last yr-- was changed from metoprolol  -- had eratic heart rate    No sinus nor viral exp..  Chr dry mouth, declines aspiration  Chr slender...  Pt was seen Dr Freire 2017 -- had pulm htn at that time..      Below from Dr. Solares:  09/18/2023- pt previously followed by Marichuy Mcelroy and saw me several yrs ago. She has HFpEF, pulmonary HTN, atrial fib, mild asthma, hx DVT on eliquis.   She has not had sleep study. She snores a lot. Denies daytime fatigue but dozes off during the day. Denies nocturnal arousals.  Overnight pulse ox study 9/2022 showed min sat 89% on RA.  Spouse is present and speaks for her- states she needs a check up. She is breathing well. Not needing inhalers. Denies cough, wheezing, respiratory infections.  When she walks she gets out of breath- from car to [employer]. This is about the same over time.  She feels heartbeat going faster about once monthly. She has a pacemaker and followed by Dr. Jurado.    Patient Instructions   Continue follow up with cardiology for pulmonary hypertension and heart rhythm issues  Home sleep study recommended and treatment with CPAP if abnormal.  12/9/2022- complaint of shortness of breath, varies in severity, followed by cardiologist. Difficult to walk even short distances with out stopping to rest, improves with rest.   Stopped Trelegy due to oral thrush.     Did not have Sleep study not sure why, had overnight pulse ox with no desaturation.     8/9/2022- recent right eye surgery July 2022; holding Trelegy due to inhaled steroid. Cough is resolved.    states loud snoring at night, complaint of daytime fatigue, worsening with time. Wakes up with generalized body aches and numbness in lower arms.     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 Has resolved. Followed by Dr. Jurado and Dr. Yeh cardiologists. Noticed breathing improved following procedure.  SOB- stable, not needing albuterol inhaler. Able to due daily activities with out stopping to rest as long as she takes her time. Stopped Trelegy for now.   Cough- recurrent complaint, non productive, no current cough.   Able to play flute with difficulty.    8/6/2021- states feeling well, Cough- decreased, states mild, associated with post nasal drip, recurrent complaint, non productive, using Trelegy most days with noticed benfit,   Shortness of breath- recurrent complaint, improves with albuterol rescue inhaler but not needing, going to gym to exercise with no difficulty.   GERD symptoms controlled on medications.     1/26/21-  Complaint of depression currently followed by Psychiatry. Depression medications worsens acid reflux. GERD worsens Asthma symptoms.   Cough- worsened since changing medications, causes gaging,voice is hoarse in last 2 weeks. Occasionally productive clear mucous.   Associated with chest tightness.  No wheeze, SOB- worsened, worse when changing positions, feels palpitations in chest.     07/21/2020- patient is a 75-year-old female with atrial fib, DVT (2014) on pradaxa, kidney infarction, history of stroke, GERD, hyperlipidemia who follows with Dr. Bryson for asthma and chronic sinusitis.  She c/o chronic AVILA especially if she has to walk uphill or push baby stroller. She is improved after using breo. She had URI symptoms in 1/2020 and thinks she may have had COVID-19 with emesis, loss of smell and tasted, aching for about 3 wks.  She had antibody test which was negative. Her  was sick as well. He assists w/ history.  She is a never smoker but says she has been diagnosed with COPD. Sinuses doing well, uses flonase intermittently. She follows w/ cardiology,  has had 3 ablations in the past and sometimes still has a fib.  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    Mark 15, 2019- breo  With  No rescue use, uses flonase, had syncope with  Ankle  Fx.      Oct 16, 2018 pt having sob.  Pt had asthma as child and outgrew.  Pt has had agarwal 2 yrs, no seasonal variation, no clear nocturnal worsening.  Pt has been on intermittent amiodarone with eval /rx Dr Spaulding.  Pt had a fib resolved.  pft in 2014 with vc 35%.  Pt had 3 ablations. On chr xarelto.  No h/o pe.  No edema.  On aldactone.  Pt had transient right paresis - resolved - tia/stroke.    Pt in er 10/14- no wheezes, place empirically on prednisone 40/d with good response.  With albuterol use once breathing felt nl - 1st within last 1-2 yrs.        The chief complaint problem is stable    PFSH:  Past Medical History:   Diagnosis Date    Anticoagulant long-term use     Anxiety     Arthritis     Atrial fibrillation     Basal cell carcinoma     Cancer     skin    CHF (congestive heart failure)     Depression     DVT (deep venous thrombosis)     GERD (gastroesophageal reflux disease)     Glaucoma (increased eye pressure)     Hyperlipidemia     diet controlled    Hypertension     Interstitial lung disease     Localized hives 01/10/2020    Mild persistent asthma without complication 11/12/2018    Pacemaker     Pneumonia 01/31/2014    Stroke 06/03/2014    Stroke     TIA (transient ischemic attack)     TIA (transient ischemic attack)          Past Surgical History:   Procedure Laterality Date    2 heart ablations      3 in total    A-V CARDIAC PACEMAKER INSERTION Left 10/14/2021    Procedure: INSERTION, CARDIAC PACEMAKER, DUAL CHAMBER;  Surgeon: Fco Calderon MD;  Location: [facility] EP LAB;  Service: Cardiology;  Laterality: Left;  PAF/ [place] Arrthymias, Dual PPM, SJM, MAC, GP, 3 PREP    ANTERIOR VITRECTOMY Right 7/27/2022    Procedure: VITRECTOMY, ANTERIOR APPROACH;  Surgeon: Daniel MCKENNA  MD Britney;  Location: [facility];  Service: Ophthalmology;  Laterality: Right;    bilateral cataracts      CHOLECYSTECTOMY      COLONOSCOPY N/A 1/19/2017    Procedure: COLONOSCOPY and EGD;  Surgeon: Jonathan Schultz MD;  Location: [facility];  Service: Endoscopy;  Laterality: N/A;    COLONOSCOPY N/A 10/10/2019    Procedure: COLONOSCOPY;  Surgeon: Alex Christian MD;  Location: [facility] ENDO;  Service: Endoscopy;  Laterality: N/A;    ESOPHAGOGASTRODUODENOSCOPY N/A 10/14/2019    Procedure: EGD (ESOPHAGOGASTRODUODENOSCOPY);  Surgeon: Alex Christian MD;  Location: [facility];  Service: Endoscopy;  Laterality: N/A;    ESOPHAGOGASTRODUODENOSCOPY N/A 1/25/2024    Procedure: EGD (ESOPHAGOGASTRODUODENOSCOPY);  Surgeon: lAex Christian MD;  Location: [facility];  Service: Endoscopy;  Laterality: N/A;    INJECTION OF ANESTHETIC AGENT AROUND MEDIAL BRANCH NERVES INNERVATING CERVICAL FACET JOINT Right 6/7/2018    Procedure: BLOCK, NERVE, FACET JOINT, MEDIAL BRANCH, CERVICAL;  Surgeon: Galo Clancy MD;  Location: [facility];  Service: Pain Management;  Laterality: Right;  C4, 5, 6    OPEN REDUCTION AND INTERNAL FIXATION (ORIF) OF INJURY OF ANKLE Right 11/1/2018    Procedure: ORIF, ANKLE;  Surgeon: Wilfredo Aguero MD;  Location: [facility];  Service: Orthopedics;  Laterality: Right;    PARACENTESIS, EYE, ANTERIOR CHAMBER, WITH AQUEOUS REMOVAL Right 7/27/2022    Procedure: Anterior chamber washout, possible IOL removal;  Surgeon: Daniel Tan MD;  Location: [facility];  Service: Ophthalmology;  Laterality: Right;    pyloristenosis      RADIOFREQUENCY ABLATION OF LUMBAR MEDIAL BRANCH NERVE AT SINGLE LEVEL Bilateral 9/21/2018    Procedure: RADIOFREQUENCY ABLATION, NERVE, SPINAL, LUMBAR, MEDIAL BRANCH, 1 LEVEL;  Surgeon: Galo Clancy MD;  Location: [facility];  Service: Pain Management;  Laterality: Bilateral;  L3, 4, 5    RADIOFREQUENCY ABLATION OF LUMBAR MEDIAL BRANCH NERVE AT SINGLE LEVEL Bilateral 2/19/2019    Procedure: Radiofrequency  Ablation, Nerve, Spinal, Lumbar, Medial Branch, 1 Level;  Surgeon: Galo Clancy MD;  Location: [facility] OR;  Service: Pain Management;  Laterality: Bilateral;  L3, 4, 5     RADIOFREQUENCY ABLATION OF LUMBAR MEDIAL BRANCH NERVE AT SINGLE LEVEL Bilateral 3/10/2020    Procedure: Radiofrequency Ablation, Nerve, Spinal, Lumbar, Medial Branch, 1 Level;  Surgeon: Galo Clancy MD;  Location: [facility] OR;  Service: Pain Management;  Laterality: Bilateral;  L3,4,5 - Burned at 80 degrees C. for 60 seconds x 2 each site      RADIOFREQUENCY ABLATION OF LUMBAR MEDIAL BRANCH NERVE AT SINGLE LEVEL Bilateral 9/11/2020    Procedure: Radiofrequency Ablation, Nerve, Spinal, Lumbar, Medial Branch, 1 Level;  Surgeon: Galo Clancy MD;  Location: [facility] OR;  Service: Pain Management;  Laterality: Bilateral;  L3, 4, 5 - Burned at 80 degrees C. for 60 seconds x 2 each site    RADIOFREQUENCY ABLATION OF LUMBAR MEDIAL BRANCH NERVE AT SINGLE LEVEL Bilateral 5/28/2021    Procedure: Radiofrequency Ablation, Nerve, Spinal, Lumbar, Medial Branch, 1 Level;  Surgeon: Galo Clancy MD;  Location: [facility] OR;  Service: Pain Management;  Laterality: Bilateral;  L3,4,5    RADIOFREQUENCY THERMAL COAGULATION OF MEDIAL BRANCH OF POSTERIOR RAMUS OF CERVICAL SPINAL NERVE Right 7/3/2018    Procedure: RADIOFREQUENCY THERMAL COAGULATION, NERVE, SPINAL, CERVICAL, MEDIAL BRANCH OF POSTERIOR RAMUS;  Surgeon: Galo Clancy MD;  Location: [facility] OR;  Service: Pain Management;  Laterality: Right;  C4,5,6 - Burned at 80 degrees C. for 75 seconds each site    RADIOFREQUENCY THERMAL COAGULATION OF MEDIAL BRANCH OF POSTERIOR RAMUS OF CERVICAL SPINAL NERVE Right 7/23/2019    Procedure: RADIOFREQUENCY THERMAL COAGULATION, NERVE, SPINAL, CERVICAL, POSTERIOR RAMUS, MEDIAL BRANCH;  Surgeon: Galo Clancy MD;  Location: [facility] OR;  Service: Pain Management;  Laterality: Right;  C4,5,6    RADIOFREQUENCY THERMAL COAGULATION OF MEDIAL BRANCH OF POSTERIOR RAMUS OF CERVICAL SPINAL NERVE Right 6/23/2020     Procedure: RADIOFREQUENCY THERMAL COAGULATION, NERVE, SPINAL, CERVICAL, POSTERIOR RAMUS, MEDIAL BRANCH;  Surgeon: Galo Clancy MD;  Location: [facility] OR;  Service: Pain Management;  Laterality: Right;  C4, 5, 6    RADIOFREQUENCY THERMOCOAGULATION Bilateral 9/10/2019    Procedure: RADIOFREQUENCY THERMAL COAGULATION LUMBAR;  Surgeon: Galo Clancy MD;  Location: [facility] OR;  Service: Pain Management;  Laterality: Bilateral;  L3,4,5 - Burned at 80 degrees C. for 60 seconds x 2 each site    skin cancer removal       TONSILLECTOMY       Social History     Tobacco Use    Smoking status: Never    Smokeless tobacco: Never   Substance Use Topics    Alcohol use: No    Drug use: No     Family History   Problem Relation Name Age of Onset    Arthritis Mother      Asthma Mother      Rheum arthritis Mother      Pneumonia Mother      Skin cancer Mother          unsure of type    Heart disease Father      Ulcers Father      Depression Son      Alzheimer's disease Maternal Uncle      Rheum arthritis Maternal Grandmother      Emphysema Maternal Grandfather      Cancer Maternal Grandfather          kidney    Kidney disease Maternal Grandfather      Cancer Paternal Grandmother          lung= smoker    Pneumonia Paternal Grandfather      Breast cancer Neg Hx      Ovarian cancer Neg Hx       Review of patient's allergies indicates:   Allergen Reactions    Gabapentin Hallucinations    Xarelto [rivaroxaban] Rash    Bactrim [sulfamethoxazole-trimethoprim] Other (See Comments)     Upset stomach, dry heaves, confusion    Amoxicillin-pot clavulanate      Other reaction(s): Mental Status Change    Atorvastatin      Other reaction(s): Joint pain    Baclofen     Baclofen (bulk) Nausea And Vomiting    Ciprofloxacin     Decongest tabs      Other reaction(s): increased heart rate    Decongestant [pseudoephedrine hcl]     Erythromycin Other (See Comments)    Flecainide Hives     And SOB. No reaction to Lidocaine     Fluoxetine      Other reaction(s): heart  "palpitations  Other reaction(s): anxiety    Lisinopril Other (See Comments)     cough    Losartan Other (See Comments)     Hypotension with lightheadedness    Morphine Other (See Comments)     confusion    Tramadol Other (See Comments)     SOB, low BP    Venlafaxine     Venlafaxine analogues      Changes in BP and increases heart rate       Afrin [oxymetazoline] Palpitations    Caffeine Palpitations    Dabigatran etexilate Rash    Plavix [clopidogrel] Rash    Tizanidine Anxiety     dizziness       Performance Status:The patient's activity level is functions out of house.      Review of Systems:  a review of eleven systems covering constitutional, Eye, HEENT, Psych, Respiratory, Cardiac, GI, , Musculoskeletal, Endocrine, Dermatologic was negative except for pertinent findings as listed ABOVE and below:  Dry mouth  Gaining a little weight  Appetite decreased    Exam:Comprehensive exam done. BP (!) 150/72 (BP Location: Left arm, Patient Position: Sitting, BP Method: Medium (Automatic))   Pulse 60   Ht 5' 7" (1.702 m)   Wt 54.3 kg (119 lb 11.4 oz)   SpO2 (!) 93% Comment: on room air  BMI 18.75 kg/m²   Exam included Vitals as listed, and patient's appearance and affect and alertness and mood, oral exam for yeast and hygiene and pharynx lesions and Mallapatti (M) score, neck with inspection for jvd and masses and thyroid abnormalities and lymph nodes (supraclavicular and infraclavicular nodes and axillary also examined and noted if abn), chest exam included symmetry and effort and fremitus and percussion and auscultation, cardiac exam included rhythm and gallops and murmur and rubs and jvd and edema, abdominal exam for mass and hepatosplenomegaly and tenderness and hernias and bowel sounds, Musculoskeletal exam with muscle tone and posture and mobility/gait and  strength, and skin for rashes and cyanosis and pallor and turgor, extremity for clubbing.  Findings were normal except for pertinent findings listed " below:  M2, thin, oropharynx dry  HR regular  Breath sounds clear bilaterally  No edema/clubbing    Radiographs (ct chest and cxr) reviewed: view by direct vision and interpreted    X-Ray Chest AP Portable   11/15/2021 enlarged heart, lungs clear    XR CHEST AP PORTABLE  10/14/2021 mild pulmonary edema     CXR 1/23/20- cardiomegaly, prominence of interstitial markings, peribronchial cuffing, RLL  infiltrates  CTA chest 2014- mosaicism consistent with air trapping, bibasilar infiltrates, small right pleural effusion, cardiomegaly with enlarged RV    TTE 9/8/22-   The left ventricle is normal in size with moderate concentric hypertrophy and normal systolic function.  The estimated ejection fraction is 60%.  Indeterminate left ventricular diastolic function.  Normal right ventricular size with normal right ventricular systolic function.  Mild left atrial enlargement.  Moderate right atrial enlargement.  Mild aortic regurgitation.  Mild mitral regurgitation.  Moderate tricuspid regurgitation.  Mild pulmonic regurgitation.  Normal central venous pressure (3 mmHg).  The estimated PA systolic pressure is 60 mmHg.  There is pulmonary hypertension.       Labs reviewed     Latest Reference Range & Units 04/09/14 08:15   LAWRENCE Negative <1:160  Negative <1:160   Rheumatoid Factor 0.0 - 15.0 IU/mL <10.0     Lab Results   Component Value Date    WBC 8.84 10/29/2023    HGB 14.3 10/29/2023    HCT 42.1 10/29/2023    MCV 94 10/29/2023     10/29/2023       CMP  Sodium   Date Value Ref Range Status   10/29/2023 140 136 - 145 mmol/L Final     Potassium   Date Value Ref Range Status   10/29/2023 3.7 3.5 - 5.1 mmol/L Final     Chloride   Date Value Ref Range Status   10/29/2023 109 95 - 110 mmol/L Final     CO2   Date Value Ref Range Status   10/29/2023 19 (L) 23 - 29 mmol/L Final     Glucose   Date Value Ref Range Status   10/29/2023 92 70 - 110 mg/dL Final     BUN   Date Value Ref Range Status   10/29/2023 13 8 - 23 mg/dL Final      Creatinine   Date Value Ref Range Status   10/29/2023 0.8 0.5 - 1.4 mg/dL Final   09/24/2012 0.6 0.2 - 1.4 mg/dl Final     Calcium   Date Value Ref Range Status   10/29/2023 9.7 8.7 - 10.5 mg/dL Final   09/24/2012 9.9 8.6 - 10.2 mg/dl Final     Total Protein   Date Value Ref Range Status   10/29/2023 6.8 6.0 - 8.4 g/dL Final     Albumin   Date Value Ref Range Status   10/29/2023 3.8 3.5 - 5.2 g/dL Final     Total Bilirubin   Date Value Ref Range Status   10/29/2023 0.9 0.1 - 1.0 mg/dL Final     Comment:     For infants and newborns, interpretation of results should be based  on gestational age, weight and in agreement with clinical  observations.    Premature Infant recommended reference ranges:  Up to 24 hours.............<8.0 mg/dL  Up to 48 hours............<12.0 mg/dL  3-5 days..................<15.0 mg/dL  6-29 days.................<15.0 mg/dL       Alkaline Phosphatase   Date Value Ref Range Status   10/29/2023 70 55 - 135 U/L Final   09/24/2012 63 23 - 119 UNIT/L Final     AST   Date Value Ref Range Status   10/29/2023 23 10 - 40 U/L Final   09/24/2012 26 10 - 30 UNIT/L Final     ALT   Date Value Ref Range Status   10/29/2023 16 10 - 44 U/L Final     Anion Gap   Date Value Ref Range Status   10/29/2023 12 8 - 16 mmol/L Final   09/24/2012 12 5 - 15 meq/L Final     eGFR   Date Value Ref Range Status   10/29/2023 >60 >60 mL/min/1.73 m^2 Final             PFT results reviewed 2014- spirometry shows severe restriction.  Lung volumes and DLCO were not performed       over night pulse ox  <88% 0 min 8/14/22    Plan:  Clinical impression is apparently straight forward and impression with management as below.   PH suspect group 2, NYHA II- with no progressive symptoms, no O2 reqt- follows w/ cardiology  R/o NILSA    Ayla was seen today for shortness of breath.    Diagnoses and all orders for this visit:    COPD exacerbation  -     CBC auto differential; Future  -     Comprehensive Metabolic Panel; Future  -      C-REACTIVE PROTEIN; Future  -     X-Ray Chest PA And Lateral; Future  -     B-TYPE NATRIURETIC PEPTIDE; Future  -     predniSONE (DELTASONE) 20 MG tablet; Take one daily for 3 days and may repeat for shortness of breath.  -     fluticasone-umeclidin-vilanter (TRELEGY ELLIPTA) 200-62.5-25 mcg inhaler; Inhale 1 puff into the lungs once daily.    Dyspnea, unspecified type  -     CBC auto differential; Future  -     Comprehensive Metabolic Panel; Future  -     C-REACTIVE PROTEIN; Future  -     X-Ray Chest PA And Lateral; Future  -     B-TYPE NATRIURETIC PEPTIDE; Future  -     predniSONE (DELTASONE) 20 MG tablet; Take one daily for 3 days and may repeat for shortness of breath.  -     fluticasone-umeclidin-vilanter (TRELEGY ELLIPTA) 200-62.5-25 mcg inhaler; Inhale 1 puff into the lungs once daily.          Follow up if symptoms worsen or fail to improve.    Discussed with patient above for education the following:      Patient Instructions   You may have some worsening of copd.    Use trelegy once daily til out  If copd -- may respond to prednisone --use if trelegy helps but not controlling    You decline beneift from prior inhalers -- xopenex   and           Chest xray 10/2023 had mild increased lung markings, ct 2016 had bilateral pleural effusions-- viewed.  Echo had good function, leaky valves, and pulmonary hypertension.  Do chest xray if not clearing.    Propranolol may worsen lungs -- was tolerated til recently        Check blood work -- cbc, cmp, crp, and bnp- if not clearing..      Prior breathing test did not have optimal effort-- lungs may not be too too bad..        Eval took 34 min

## 2024-05-30 ENCOUNTER — HOSPITAL ENCOUNTER (OUTPATIENT)
Dept: RADIOLOGY | Facility: CLINIC | Age: 79
Discharge: HOME | End: 2024-05-30
Attending: INTERNAL MEDICINE
Payer: MEDICARE

## 2024-05-30 DIAGNOSIS — J44.1 COPD EXACERBATION: ICD-10-CM

## 2024-05-30 DIAGNOSIS — R06.00 DYSPNEA, UNSPECIFIED TYPE: ICD-10-CM

## 2024-05-30 PROCEDURE — 71046 X-RAY EXAM CHEST 2 VIEWS: CPT | Mod: TC,FY,PO

## 2024-05-30 PROCEDURE — 71046 X-RAY EXAM CHEST 2 VIEWS: CPT | Mod: 26,,, | Performed by: RADIOLOGY

## 2024-06-03 DIAGNOSIS — I50.30 HEART FAILURE WITH PRESERVED EJECTION FRACTION, UNSPECIFIED HF CHRONICITY: Primary | ICD-10-CM

## 2024-06-03 RX ORDER — TRIAMTERENE AND HYDROCHLOROTHIAZIDE 75; 50 MG/1; MG/1
1 TABLET ORAL DAILY
Qty: 30 TABLET | Refills: 0 | Status: SHIPPED | OUTPATIENT
Start: 2024-06-03 | End: 2025-06-03

## 2024-06-10 ENCOUNTER — PATIENT MESSAGE (OUTPATIENT)
Dept: DERMATOLOGY | Facility: CLINIC | Age: 79
End: 2024-06-10
Payer: MEDICARE

## 2024-06-13 ENCOUNTER — LAB VISIT (OUTPATIENT)
Dept: LAB | Facility: HOSPITAL | Age: 79
End: 2024-06-13
Attending: INTERNAL MEDICINE
Payer: MEDICARE

## 2024-06-13 DIAGNOSIS — R06.00 DYSPNEA, UNSPECIFIED TYPE: ICD-10-CM

## 2024-06-13 DIAGNOSIS — J44.1 COPD EXACERBATION: ICD-10-CM

## 2024-06-13 DIAGNOSIS — R79.89 ELEVATED BRAIN NATRIURETIC PEPTIDE (BNP) LEVEL: Primary | ICD-10-CM

## 2024-06-13 LAB — BNP SERPL-MCNC: 141 PG/ML (ref 0–99)

## 2024-06-13 PROCEDURE — 36415 COLL VENOUS BLD VENIPUNCTURE: CPT | Performed by: INTERNAL MEDICINE

## 2024-06-13 PROCEDURE — 83880 ASSAY OF NATRIURETIC PEPTIDE: CPT | Performed by: INTERNAL MEDICINE

## 2024-06-13 RX ORDER — FUROSEMIDE 20 MG/1
20 TABLET ORAL DAILY PRN
Qty: 10 TABLET | Refills: 0 | Status: SHIPPED | OUTPATIENT
Start: 2024-06-13 | End: 2025-06-13

## 2024-06-27 ENCOUNTER — OFFICE VISIT (OUTPATIENT)
Dept: PULMONOLOGY | Facility: CLINIC | Age: 79
End: 2024-06-27
Payer: MEDICARE

## 2024-06-27 ENCOUNTER — HOSPITAL ENCOUNTER (OUTPATIENT)
Dept: RADIOLOGY | Facility: HOSPITAL | Age: 79
Discharge: HOME | End: 2024-06-27
Attending: PHYSICIAN ASSISTANT
Payer: MEDICARE

## 2024-06-27 VITALS
HEIGHT: 67 IN | WEIGHT: 119.69 LBS | OXYGEN SATURATION: 95 % | HEART RATE: 62 BPM | DIASTOLIC BLOOD PRESSURE: 64 MMHG | SYSTOLIC BLOOD PRESSURE: 113 MMHG | BODY MASS INDEX: 18.79 KG/M2

## 2024-06-27 DIAGNOSIS — E04.1 NODULAR THYROID DISEASE: Primary | ICD-10-CM

## 2024-06-27 DIAGNOSIS — R06.00 DYSPNEA, UNSPECIFIED TYPE: ICD-10-CM

## 2024-06-27 DIAGNOSIS — R79.89 ELEVATED BRAIN NATRIURETIC PEPTIDE (BNP) LEVEL: ICD-10-CM

## 2024-06-27 DIAGNOSIS — I50.30 HEART FAILURE WITH PRESERVED EJECTION FRACTION, UNSPECIFIED HF CHRONICITY: ICD-10-CM

## 2024-06-27 DIAGNOSIS — E04.2 MULTIPLE THYROID NODULES: ICD-10-CM

## 2024-06-27 PROCEDURE — 99999 PR PBB SHADOW E&M-EST. PATIENT-LVL III: CPT | Mod: PBBFAC,,, | Performed by: INTERNAL MEDICINE

## 2024-06-27 PROCEDURE — 76536 US EXAM OF HEAD AND NECK: CPT | Mod: 26,,, | Performed by: RADIOLOGY

## 2024-06-27 PROCEDURE — 76536 US EXAM OF HEAD AND NECK: CPT | Mod: TC

## 2024-06-27 RX ORDER — FUROSEMIDE 20 MG/1
20 TABLET ORAL DAILY PRN
Qty: 10 TABLET | Refills: 3 | Status: SHIPPED | OUTPATIENT
Start: 2024-06-27 | End: 2025-06-27

## 2024-06-27 RX ORDER — TRIAMTERENE AND HYDROCHLOROTHIAZIDE 75; 50 MG/1; MG/1
1 TABLET ORAL DAILY
Qty: 30 TABLET | Refills: 11 | Status: SHIPPED | OUTPATIENT
Start: 2024-06-27 | End: 2025-06-27

## 2024-06-27 NOTE — PATIENT INSTRUCTIONS
Thyroid ultrasound was stable-- will order ultrasound yearly for 3 checks,next due in 6/2025.    Check blood chemistry now for kidney     Use triam/hctz daily    Use furosemide minimally -- once or twice a week only if needed.....   do blood test if needs over 2 pills in a week    Will plan to recheck in a yr if needed -- could see pcp??

## 2024-06-27 NOTE — PROGRESS NOTES
2024    Ayla Dela Cruz  Office Note    Chief Complaint   Patient presents with    Follow-up       HPI:     2024-uses maxzide daily -- use lasix every 2-3 days.  The dyspnea has resolved.  Patient has not had shortness of breath since she last was seen in late May.  No adverse effects noted from diuretics.  She has not had a follow-up BNP.    Follow-up thyroid ultrasound was performed-nodules were stable-no biopsy was plan.  Recommendation from ENT was yearly thyroid ultrasounds.  Will order these yearly x3.  The patient's primary doctors retiring.  A new primary should follow this up.    From sticky note within the last 2 months-  Notify blood test suggest possible fluid in the lung from heart problems.  She is already on Maxzide.  She may use furosemide 20 mg once every 3 or 4 days if she has any edema.  She is to be followed up later this month.  Notify chest x-ray shows heart is a little bit enlarged.  There is evidence of she may be carrying too much fluid.  Would recommend taking Maxzide once daily, checking a BNP blood test 1 week after starting Maxzide.  She is to be seen in the office in later .  Notify.      May 29, 2024-- pt having more np cough and some sob develop last  wk.  Morning worse.  Was on trelegy in past..  Uses propranolol last yr-- was changed from metoprolol  -- had eratic heart rate    No sinus nor viral exp..  Chr dry mouth, declines aspiration  Chr slender...  Pt was seen Dr Freire  -- had pulm htn at that time..  Patient Instructions   You may have some worsening of copd.    Use trelegy once daily til out  If copd -- may respond to prednisone --use if trelegy helps but not controlling    You decline beneift from prior inhalers -- xopenex   and           Chest xray 10/2023 had mild increased lung markings, ct 2016 had bilateral pleural effusions-- viewed.  Echo had good function, leaky valves, and pulmonary hypertension.  Do chest xray if not  clearing.    Propranolol may worsen lungs -- was tolerated til recently        Check blood work -- cbc, cmp, crp, and bnp- if not clearing..      Prior breathing test did not have optimal effort-- lungs may not be too too bad..        Below from Dr. Solares:  09/18/2023- pt previously followed by Marichuy Mcelroy and saw me several yrs ago. She has HFpEF, pulmonary HTN, atrial fib, mild asthma, hx DVT on eliquis.   She has not had sleep study. She snores a lot. Denies daytime fatigue but dozes off during the day. Denies nocturnal arousals.  Overnight pulse ox study 9/2022 showed min sat 89% on RA.  Spouse is present and speaks for her- states she needs a check up. She is breathing well. Not needing inhalers. Denies cough, wheezing, respiratory infections.  When she walks she gets out of breath- from car to [employer]. This is about the same over time.  She feels heartbeat going faster about once monthly. She has a pacemaker and followed by Dr. Jurado.    Patient Instructions   Continue follow up with cardiology for pulmonary hypertension and heart rhythm issues  Home sleep study recommended and treatment with CPAP if abnormal.  12/9/2022- complaint of shortness of breath, varies in severity, followed by cardiologist. Difficult to walk even short distances with out stopping to rest, improves with rest.   Stopped Trelegy due to oral thrush.     Did not have Sleep study not sure why, had overnight pulse ox with no desaturation.     8/9/2022- recent right eye surgery July 2022; holding Trelegy due to inhaled steroid. Cough is resolved.    states loud snoring at night, complaint of daytime fatigue, worsening with time. Wakes up with generalized body aches and numbness in lower arms.     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 Has resolved. Followed by Dr. Jurado and Dr. Yeh cardiologists. Noticed breathing improved following  procedure.  SOB- stable, not needing albuterol inhaler. Able to due daily activities with out stopping to rest as long as she takes her time. Stopped Trelegy for now.   Cough- recurrent complaint, non productive, no current cough.   Able to play flute with difficulty.    8/6/2021- states feeling well, Cough- decreased, states mild, associated with post nasal drip, recurrent complaint, non productive, using Trelegy most days with noticed benfit,   Shortness of breath- recurrent complaint, improves with albuterol rescue inhaler but not needing, going to gym to exercise with no difficulty.   GERD symptoms controlled on medications.     1/26/21-  Complaint of depression currently followed by Psychiatry. Depression medications worsens acid reflux. GERD worsens Asthma symptoms.   Cough- worsened since changing medications, causes gaging,voice is hoarse in last 2 weeks. Occasionally productive clear mucous.   Associated with chest tightness.  No wheeze, SOB- worsened, worse when changing positions, feels palpitations in chest.     07/21/2020- patient is a 75-year-old female with atrial fib, DVT (2014) on pradaxa, kidney infarction, history of stroke, GERD, hyperlipidemia who follows with Dr. Bryson for asthma and chronic sinusitis.  She c/o chronic AVILA especially if she has to walk uphill or push baby stroller. She is improved after using breo. She had URI symptoms in 1/2020 and thinks she may have had COVID-19 with emesis, loss of smell and tasted, aching for about 3 wks.  She had antibody test which was negative. Her  was sick as well. He assists w/ history.  She is a never smoker but says she has been diagnosed with COPD. Sinuses doing well, uses flonase intermittently. She follows w/ cardiology, has had 3 ablations in the past and sometimes still has a fib.  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    Mark 15, 2019- breo  With  No rescue use, uses flonase, had syncope  with  Ankle  Fx.      Oct 16, 2018 pt having sob.  Pt had asthma as child and outgrew.  Pt has had agarwal 2 yrs, no seasonal variation, no clear nocturnal worsening.  Pt has been on intermittent amiodarone with eval /rx Dr Spaulding.  Pt had a fib resolved.  pft in 2014 with vc 35%.  Pt had 3 ablations. On chr xarelto.  No h/o pe.  No edema.  On aldactone.  Pt had transient right paresis - resolved - tia/stroke.    Pt in er 10/14- no wheezes, place empirically on prednisone 40/d with good response.  With albuterol use once breathing felt nl - 1st within last 1-2 yrs.        The chief complaint problem is stable    PFSH:  Past Medical History:   Diagnosis Date    Anticoagulant long-term use     Anxiety     Arthritis     Atrial fibrillation     Basal cell carcinoma     Cancer     skin    CHF (congestive heart failure)     Depression     DVT (deep venous thrombosis)     GERD (gastroesophageal reflux disease)     Glaucoma (increased eye pressure)     Hyperlipidemia     diet controlled    Hypertension     Interstitial lung disease     Localized hives 01/10/2020    Mild persistent asthma without complication 11/12/2018    Pacemaker     Pneumonia 01/31/2014    Stroke 06/03/2014    Stroke     TIA (transient ischemic attack)     TIA (transient ischemic attack)          Past Surgical History:   Procedure Laterality Date    2 heart ablations      3 in total    A-V CARDIAC PACEMAKER INSERTION Left 10/14/2021    Procedure: INSERTION, CARDIAC PACEMAKER, DUAL CHAMBER;  Surgeon: Fco Calderon MD;  Location: [facility] EP LAB;  Service: Cardiology;  Laterality: Left;  PAF/ Niederwald Arrthymias, Dual PPM, SJM, MAC, GP, 3 PREP    ANTERIOR VITRECTOMY Right 7/27/2022    Procedure: VITRECTOMY, ANTERIOR APPROACH;  Surgeon: Daniel Tan MD;  Location: [facility] OR;  Service: Ophthalmology;  Laterality: Right;    bilateral cataracts      CHOLECYSTECTOMY      COLONOSCOPY N/A 1/19/2017    Procedure: COLONOSCOPY and EGD;  Surgeon: Jonathan Schultz MD;   Location: [facility] ENDO;  Service: Endoscopy;  Laterality: N/A;    COLONOSCOPY N/A 10/10/2019    Procedure: COLONOSCOPY;  Surgeon: Alex Christian MD;  Location: [facility] ENDO;  Service: Endoscopy;  Laterality: N/A;    ESOPHAGOGASTRODUODENOSCOPY N/A 10/14/2019    Procedure: EGD (ESOPHAGOGASTRODUODENOSCOPY);  Surgeon: Alex Christian MD;  Location: [facility] ENDO;  Service: Endoscopy;  Laterality: N/A;    ESOPHAGOGASTRODUODENOSCOPY N/A 1/25/2024    Procedure: EGD (ESOPHAGOGASTRODUODENOSCOPY);  Surgeon: Alex Christian MD;  Location: [facility] ENDO;  Service: Endoscopy;  Laterality: N/A;    INJECTION OF ANESTHETIC AGENT AROUND MEDIAL BRANCH NERVES INNERVATING CERVICAL FACET JOINT Right 6/7/2018    Procedure: BLOCK, NERVE, FACET JOINT, MEDIAL BRANCH, CERVICAL;  Surgeon: Galo Clancy MD;  Location: [facility];  Service: Pain Management;  Laterality: Right;  C4, 5, 6    OPEN REDUCTION AND INTERNAL FIXATION (ORIF) OF INJURY OF ANKLE Right 11/1/2018    Procedure: ORIF, ANKLE;  Surgeon: Wilfredo Aguero MD;  Location: [facility];  Service: Orthopedics;  Laterality: Right;    PARACENTESIS, EYE, ANTERIOR CHAMBER, WITH AQUEOUS REMOVAL Right 7/27/2022    Procedure: Anterior chamber washout, possible IOL removal;  Surgeon: Daniel Tan MD;  Location: [facility] OR;  Service: Ophthalmology;  Laterality: Right;    pyloristenosis      RADIOFREQUENCY ABLATION OF LUMBAR MEDIAL BRANCH NERVE AT SINGLE LEVEL Bilateral 9/21/2018    Procedure: RADIOFREQUENCY ABLATION, NERVE, SPINAL, LUMBAR, MEDIAL BRANCH, 1 LEVEL;  Surgeon: Galo Clancy MD;  Location: [facility] OR;  Service: Pain Management;  Laterality: Bilateral;  L3, 4, 5    RADIOFREQUENCY ABLATION OF LUMBAR MEDIAL BRANCH NERVE AT SINGLE LEVEL Bilateral 2/19/2019    Procedure: Radiofrequency Ablation, Nerve, Spinal, Lumbar, Medial Branch, 1 Level;  Surgeon: Galo Clancy MD;  Location: [facility] OR;  Service: Pain Management;  Laterality: Bilateral;  L3, 4, 5     RADIOFREQUENCY ABLATION OF LUMBAR MEDIAL BRANCH NERVE  AT SINGLE LEVEL Bilateral 3/10/2020    Procedure: Radiofrequency Ablation, Nerve, Spinal, Lumbar, Medial Branch, 1 Level;  Surgeon: Galo Clancy MD;  Location: [facility] OR;  Service: Pain Management;  Laterality: Bilateral;  L3,4,5 - Burned at 80 degrees C. for 60 seconds x 2 each site      RADIOFREQUENCY ABLATION OF LUMBAR MEDIAL BRANCH NERVE AT SINGLE LEVEL Bilateral 9/11/2020    Procedure: Radiofrequency Ablation, Nerve, Spinal, Lumbar, Medial Branch, 1 Level;  Surgeon: Galo Clancy MD;  Location: [facility] OR;  Service: Pain Management;  Laterality: Bilateral;  L3, 4, 5 - Burned at 80 degrees C. for 60 seconds x 2 each site    RADIOFREQUENCY ABLATION OF LUMBAR MEDIAL BRANCH NERVE AT SINGLE LEVEL Bilateral 5/28/2021    Procedure: Radiofrequency Ablation, Nerve, Spinal, Lumbar, Medial Branch, 1 Level;  Surgeon: Galo Clancy MD;  Location: [facility] OR;  Service: Pain Management;  Laterality: Bilateral;  L3,4,5    RADIOFREQUENCY THERMAL COAGULATION OF MEDIAL BRANCH OF POSTERIOR RAMUS OF CERVICAL SPINAL NERVE Right 7/3/2018    Procedure: RADIOFREQUENCY THERMAL COAGULATION, NERVE, SPINAL, CERVICAL, MEDIAL BRANCH OF POSTERIOR RAMUS;  Surgeon: Galo Clancy MD;  Location: [facility] OR;  Service: Pain Management;  Laterality: Right;  C4,5,6 - Burned at 80 degrees C. for 75 seconds each site    RADIOFREQUENCY THERMAL COAGULATION OF MEDIAL BRANCH OF POSTERIOR RAMUS OF CERVICAL SPINAL NERVE Right 7/23/2019    Procedure: RADIOFREQUENCY THERMAL COAGULATION, NERVE, SPINAL, CERVICAL, POSTERIOR RAMUS, MEDIAL BRANCH;  Surgeon: Galo Clancy MD;  Location: [facility] OR;  Service: Pain Management;  Laterality: Right;  C4,5,6    RADIOFREQUENCY THERMAL COAGULATION OF MEDIAL BRANCH OF POSTERIOR RAMUS OF CERVICAL SPINAL NERVE Right 6/23/2020    Procedure: RADIOFREQUENCY THERMAL COAGULATION, NERVE, SPINAL, CERVICAL, POSTERIOR RAMUS, MEDIAL BRANCH;  Surgeon: Galo Clancy MD;  Location: [facility] OR;  Service: Pain Management;  Laterality: Right;  C4, 5, 6    RADIOFREQUENCY  THERMOCOAGULATION Bilateral 9/10/2019    Procedure: RADIOFREQUENCY THERMAL COAGULATION LUMBAR;  Surgeon: Galo Clancy MD;  Location: [facility] OR;  Service: Pain Management;  Laterality: Bilateral;  L3,4,5 - Burned at 80 degrees C. for 60 seconds x 2 each site    skin cancer removal       TONSILLECTOMY       Social History     Tobacco Use    Smoking status: Never    Smokeless tobacco: Never   Substance Use Topics    Alcohol use: No    Drug use: No     Family History   Problem Relation Name Age of Onset    Arthritis Mother      Asthma Mother      Rheum arthritis Mother      Pneumonia Mother      Skin cancer Mother          unsure of type    Heart disease Father      Ulcers Father      Depression Son      Alzheimer's disease Maternal Uncle      Rheum arthritis Maternal Grandmother      Emphysema Maternal Grandfather      Cancer Maternal Grandfather          kidney    Kidney disease Maternal Grandfather      Cancer Paternal Grandmother          lung= smoker    Pneumonia Paternal Grandfather      Breast cancer Neg Hx      Ovarian cancer Neg Hx       Review of patient's allergies indicates:   Allergen Reactions    Gabapentin Hallucinations    Xarelto [rivaroxaban] Rash    Bactrim [sulfamethoxazole-trimethoprim] Other (See Comments)     Upset stomach, dry heaves, confusion    Amoxicillin-pot clavulanate      Other reaction(s): Mental Status Change    Atorvastatin      Other reaction(s): Joint pain    Baclofen     Baclofen (bulk) Nausea And Vomiting    Ciprofloxacin     Decongest tabs      Other reaction(s): increased heart rate    Decongestant [pseudoephedrine hcl]     Erythromycin Other (See Comments)    Flecainide Hives     And SOB. No reaction to Lidocaine     Fluoxetine      Other reaction(s): heart palpitations  Other reaction(s): anxiety    Lisinopril Other (See Comments)     cough    Losartan Other (See Comments)     Hypotension with lightheadedness    Morphine Other (See Comments)     confusion    Tramadol Other (See  "Comments)     SOB, low BP    Venlafaxine     Venlafaxine analogues      Changes in BP and increases heart rate       Afrin [oxymetazoline] Palpitations    Caffeine Palpitations    Dabigatran etexilate Rash    Plavix [clopidogrel] Rash    Tizanidine Anxiety     dizziness       Performance Status:The patient's activity level is functions out of house.      Review of Systems:  a review of eleven systems covering constitutional, Eye, HEENT, Psych, Respiratory, Cardiac, GI, , Musculoskeletal, Endocrine, Dermatologic was negative except for pertinent findings as listed ABOVE and below:  Dry mouth  Gaining a little weight  Appetite decreased    Exam:Comprehensive exam done. /64 (BP Location: Left arm, Patient Position: Sitting, BP Method: Small (Automatic))   Pulse 62   Ht 5' 7" (1.702 m)   Wt 54.3 kg (119 lb 11.4 oz)   SpO2 95% Comment: on room air at rest  BMI 18.75 kg/m²   Exam included Vitals as listed, and patient's appearance and affect and alertness and mood, oral exam for yeast and hygiene and pharynx lesions and Mallapatti (M) score, neck with inspection for jvd and masses and thyroid abnormalities and lymph nodes (supraclavicular and infraclavicular nodes and axillary also examined and noted if abn), chest exam included symmetry and effort and fremitus and percussion and auscultation, cardiac exam included rhythm and gallops and murmur and rubs and jvd and edema, abdominal exam for mass and hepatosplenomegaly and tenderness and hernias and bowel sounds, Musculoskeletal exam with muscle tone and posture and mobility/gait and  strength, and skin for rashes and cyanosis and pallor and turgor, extremity for clubbing.  Findings were normal except for pertinent findings listed below:  M2, thin, oropharynx dry  HR regular  Breath sounds clear bilaterally  No edema/clubbing    Radiographs (ct chest and cxr) reviewed: view by direct vision and interpreted    X-Ray Chest AP Portable   11/15/2021 enlarged " heart, lungs clear    XR CHEST AP PORTABLE  10/14/2021 mild pulmonary edema     CXR 1/23/20- cardiomegaly, prominence of interstitial markings, peribronchial cuffing, RLL  infiltrates  CTA chest 2014- mosaicism consistent with air trapping, bibasilar infiltrates, small right pleural effusion, cardiomegaly with enlarged RV    TTE 9/8/22-   The left ventricle is normal in size with moderate concentric hypertrophy and normal systolic function.  The estimated ejection fraction is 60%.  Indeterminate left ventricular diastolic function.  Normal right ventricular size with normal right ventricular systolic function.  Mild left atrial enlargement.  Moderate right atrial enlargement.  Mild aortic regurgitation.  Mild mitral regurgitation.  Moderate tricuspid regurgitation.  Mild pulmonic regurgitation.  Normal central venous pressure (3 mmHg).  The estimated PA systolic pressure is 60 mmHg.  There is pulmonary hypertension.       Labs reviewed     Latest Reference Range & Units 04/09/14 08:15   LAWRENCE Negative <1:160  Negative <1:160   Rheumatoid Factor 0.0 - 15.0 IU/mL <10.0     Lab Results   Component Value Date    WBC 8.84 10/29/2023    HGB 14.3 10/29/2023    HCT 42.1 10/29/2023    MCV 94 10/29/2023     10/29/2023       CMP  Sodium   Date Value Ref Range Status   10/29/2023 140 136 - 145 mmol/L Final     Potassium   Date Value Ref Range Status   10/29/2023 3.7 3.5 - 5.1 mmol/L Final     Chloride   Date Value Ref Range Status   10/29/2023 109 95 - 110 mmol/L Final     CO2   Date Value Ref Range Status   10/29/2023 19 (L) 23 - 29 mmol/L Final     Glucose   Date Value Ref Range Status   10/29/2023 92 70 - 110 mg/dL Final     BUN   Date Value Ref Range Status   10/29/2023 13 8 - 23 mg/dL Final     Creatinine   Date Value Ref Range Status   10/29/2023 0.8 0.5 - 1.4 mg/dL Final   09/24/2012 0.6 0.2 - 1.4 mg/dl Final     Calcium   Date Value Ref Range Status   10/29/2023 9.7 8.7 - 10.5 mg/dL Final   09/24/2012 9.9 8.6 -  10.2 mg/dl Final     Total Protein   Date Value Ref Range Status   10/29/2023 6.8 6.0 - 8.4 g/dL Final     Albumin   Date Value Ref Range Status   10/29/2023 3.8 3.5 - 5.2 g/dL Final     Total Bilirubin   Date Value Ref Range Status   10/29/2023 0.9 0.1 - 1.0 mg/dL Final     Comment:     For infants and newborns, interpretation of results should be based  on gestational age, weight and in agreement with clinical  observations.    Premature Infant recommended reference ranges:  Up to 24 hours.............<8.0 mg/dL  Up to 48 hours............<12.0 mg/dL  3-5 days..................<15.0 mg/dL  6-29 days.................<15.0 mg/dL       Alkaline Phosphatase   Date Value Ref Range Status   10/29/2023 70 55 - 135 U/L Final   09/24/2012 63 23 - 119 UNIT/L Final     AST   Date Value Ref Range Status   10/29/2023 23 10 - 40 U/L Final   09/24/2012 26 10 - 30 UNIT/L Final     ALT   Date Value Ref Range Status   10/29/2023 16 10 - 44 U/L Final     Anion Gap   Date Value Ref Range Status   10/29/2023 12 8 - 16 mmol/L Final   09/24/2012 12 5 - 15 meq/L Final     eGFR   Date Value Ref Range Status   10/29/2023 >60 >60 mL/min/1.73 m^2 Final             PFT results reviewed 2014- spirometry shows severe restriction.  Lung volumes and DLCO were not performed       over night pulse ox  <88% 0 min 8/14/22    Plan:  Clinical impression is apparently straight forward and impression with management as below.   PH suspect group 2, NYHA II- with no progressive symptoms, no O2 reqt- follows w/ cardiology  R/o NILSA    Ayla was seen today for follow-up.    Diagnoses and all orders for this visit:    Nodular thyroid disease  -     US Thyroid; Standing    Heart failure with preserved ejection fraction, unspecified HF chronicity  -     Basic Metabolic Panel; Standing  -     triamterene-hydrochlorothiazide 75-50 mg (MAXZIDE) 75-50 mg per tablet; Take 1 tablet by mouth once daily.    Dyspnea, unspecified type  -      triamterene-hydrochlorothiazide 75-50 mg (MAXZIDE) 75-50 mg per tablet; Take 1 tablet by mouth once daily.  -     furosemide (LASIX) 20 MG tablet; Take 1 tablet (20 mg total) by mouth daily as needed (Edema).    Elevated brain natriuretic peptide (BNP) level  -     triamterene-hydrochlorothiazide 75-50 mg (MAXZIDE) 75-50 mg per tablet; Take 1 tablet by mouth once daily.  -     furosemide (LASIX) 20 MG tablet; Take 1 tablet (20 mg total) by mouth daily as needed (Edema).            Follow up in about 1 year (around 6/27/2025).    Discussed with patient above for education the following:

## 2024-06-28 ENCOUNTER — LAB VISIT (OUTPATIENT)
Dept: LAB | Facility: HOSPITAL | Age: 79
End: 2024-06-28
Attending: INTERNAL MEDICINE
Payer: MEDICARE

## 2024-06-28 DIAGNOSIS — I50.30 HEART FAILURE WITH PRESERVED EJECTION FRACTION, UNSPECIFIED HF CHRONICITY: ICD-10-CM

## 2024-06-28 LAB
ANION GAP SERPL CALC-SCNC: 11 MMOL/L (ref 8–16)
BUN SERPL-MCNC: 19 MG/DL (ref 8–23)
CALCIUM SERPL-MCNC: 10 MG/DL (ref 8.7–10.5)
CHLORIDE SERPL-SCNC: 100 MMOL/L (ref 95–110)
CO2 SERPL-SCNC: 26 MMOL/L (ref 23–29)
CREAT SERPL-MCNC: 0.9 MG/DL (ref 0.5–1.4)
EST. GFR  (NO RACE VARIABLE): >60 ML/MIN/1.73 M^2
GLUCOSE SERPL-MCNC: 148 MG/DL (ref 70–110)
POTASSIUM SERPL-SCNC: 3.8 MMOL/L (ref 3.5–5.1)
SODIUM SERPL-SCNC: 137 MMOL/L (ref 136–145)

## 2024-06-28 PROCEDURE — 80048 BASIC METABOLIC PNL TOTAL CA: CPT | Performed by: INTERNAL MEDICINE

## 2024-06-28 PROCEDURE — 36415 COLL VENOUS BLD VENIPUNCTURE: CPT | Mod: PO | Performed by: INTERNAL MEDICINE

## 2024-07-01 ENCOUNTER — OFFICE VISIT (OUTPATIENT)
Dept: CARDIOLOGY | Facility: CLINIC | Age: 79
End: 2024-07-01
Payer: MEDICARE

## 2024-07-01 VITALS
OXYGEN SATURATION: 98 % | HEART RATE: 68 BPM | BODY MASS INDEX: 17.23 KG/M2 | SYSTOLIC BLOOD PRESSURE: 116 MMHG | WEIGHT: 110 LBS | DIASTOLIC BLOOD PRESSURE: 70 MMHG

## 2024-07-01 DIAGNOSIS — D50.0 IRON DEFICIENCY ANEMIA DUE TO CHRONIC BLOOD LOSS: ICD-10-CM

## 2024-07-01 DIAGNOSIS — R06.09 DOE (DYSPNEA ON EXERTION): ICD-10-CM

## 2024-07-01 DIAGNOSIS — I27.20 PULMONARY HYPERTENSION: ICD-10-CM

## 2024-07-01 DIAGNOSIS — I48.0 PAROXYSMAL ATRIAL FIBRILLATION: Primary | ICD-10-CM

## 2024-07-01 DIAGNOSIS — Z79.01 LONG TERM (CURRENT) USE OF ANTICOAGULANTS: ICD-10-CM

## 2024-07-01 DIAGNOSIS — Z79.01 ANTICOAGULANT LONG-TERM USE: ICD-10-CM

## 2024-07-01 DIAGNOSIS — I11.0 HYPERTENSIVE LEFT VENTRICULAR HYPERTROPHY WITH HEART FAILURE: ICD-10-CM

## 2024-07-01 DIAGNOSIS — Z95.0 CARDIAC PACEMAKER IN SITU: ICD-10-CM

## 2024-07-01 DIAGNOSIS — Z86.79 S/P ABLATION OF ATRIAL FLUTTER: ICD-10-CM

## 2024-07-01 DIAGNOSIS — F13.20 BENZODIAZEPINE DEPENDENCE: ICD-10-CM

## 2024-07-01 DIAGNOSIS — Z91.89 SEDENTARY LIFESTYLE: ICD-10-CM

## 2024-07-01 DIAGNOSIS — R54 FRAIL ELDERLY: ICD-10-CM

## 2024-07-01 DIAGNOSIS — E78.00 PURE HYPERCHOLESTEROLEMIA: ICD-10-CM

## 2024-07-01 DIAGNOSIS — R79.89 ELEVATED BRAIN NATRIURETIC PEPTIDE (BNP) LEVEL: ICD-10-CM

## 2024-07-01 DIAGNOSIS — I10 PRIMARY HYPERTENSION: ICD-10-CM

## 2024-07-01 DIAGNOSIS — Z98.890 S/P ABLATION OF ATRIAL FLUTTER: ICD-10-CM

## 2024-07-01 DIAGNOSIS — Z86.73 H/O: CVA (CEREBROVASCULAR ACCIDENT): ICD-10-CM

## 2024-07-01 DIAGNOSIS — R41.89 COGNITIVE IMPAIRMENT: ICD-10-CM

## 2024-07-01 DIAGNOSIS — G47.33 OSA ON CPAP: ICD-10-CM

## 2024-07-01 DIAGNOSIS — R53.82 CHRONIC FATIGUE: ICD-10-CM

## 2024-07-01 LAB
OHS QRS DURATION: 110 MS
OHS QTC CALCULATION: 477 MS

## 2024-07-01 PROCEDURE — 99999 PR PBB SHADOW E&M-EST. PATIENT-LVL IV: CPT | Mod: PBBFAC,,, | Performed by: INTERNAL MEDICINE

## 2024-07-01 NOTE — PROGRESS NOTES
Subjective:    Patient ID:  Ayla Dela Cruz is a 79 y.o. female who presents for evaluation of No chief complaint on file.  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 Significant weight loss due to major depression off Limbitrol (stopped 2010), diet controlled T2DM. 6-months follow up.  PCP: Dr. Olivo, see annually, do labs  Opthalmologist: Daniel Tan MD  EP: Dr. Calderon, last seen 3/2024  Orthopedic: Dr. Aguero  Surgeon: Dr. Gonsalez, and Dr. Dc  Rheumatologist: Dr. Pak  Prior cardiologist: Dr. Gibson, last seen over a year  Pulmonary: Marichuy Mcelroy, NP, Lars Bryson MD   Psychologist: Galo Lipscomb, PhD, MP  Gyn: Dr. Jordan  GI: Dr. Schultz  Neurologist: Dr. Ramirez  Dermatologist: Dennis Corrigan  Pain: Dr. 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  Diet: regular, poor appetite  Caffeine: none  Labs: 1/2019 LDL 90.6 on Crestor 40 mg, normal TSH, CMP (albumin 3.1), normal CBC, Vit. D  10/2019 AST 95, Hgb 11.4  Lab Results   Component Value Date    TSH 1.078 10/25/2023        Lab Results   Component Value Date    HGBA1C 5.6 10/05/2023       Normal H&H in 11/15/2021  Lab Results   Component Value Date    WBC 8.84 10/29/2023    HGB 14.3 10/29/2023    HCT 42.1  10/29/2023    MCV 94 10/29/2023     10/29/2023       CMP  Sodium   Date Value Ref Range Status   06/28/2024 137 136 - 145 mmol/L Final     Potassium   Date Value Ref Range Status   06/28/2024 3.8 3.5 - 5.1 mmol/L Final     Chloride   Date Value Ref Range Status   06/28/2024 100 95 - 110 mmol/L Final     CO2   Date Value Ref Range Status   06/28/2024 26 23 - 29 mmol/L Final     Glucose   Date Value Ref Range Status   06/28/2024 148 (H) 70 - 110 mg/dL Final     BUN   Date Value Ref Range Status   06/28/2024 19 8 - 23 mg/dL Final     Creatinine   Date Value Ref Range Status   06/28/2024 0.9 0.5 - 1.4 mg/dL Final   09/24/2012 0.6 0.2 - 1.4 mg/dl Final     Calcium   Date Value Ref Range Status   06/28/2024 10.0 8.7 - 10.5 mg/dL Final   09/24/2012 9.9 8.6 - 10.2 mg/dl Final     Total Protein   Date Value Ref Range Status   10/29/2023 6.8 6.0 - 8.4 g/dL Final     Albumin   Date Value Ref Range Status   10/29/2023 3.8 3.5 - 5.2 g/dL Final     Total Bilirubin   Date Value Ref Range Status   10/29/2023 0.9 0.1 - 1.0 mg/dL Final     Comment:     For infants and newborns, interpretation of results should be based  on gestational age, weight and in agreement with clinical  observations.    Premature Infant recommended reference ranges:  Up to 24 hours.............<8.0 mg/dL  Up to 48 hours............<12.0 mg/dL  3-5 days..................<15.0 mg/dL  6-29 days.................<15.0 mg/dL       Alkaline Phosphatase   Date Value Ref Range Status   10/29/2023 70 55 - 135 U/L Final   09/24/2012 63 23 - 119 UNIT/L Final     AST   Date Value Ref Range Status   10/29/2023 23 10 - 40 U/L Final   09/24/2012 26 10 - 30 UNIT/L Final     ALT   Date Value Ref Range Status   10/29/2023 16 10 - 44 U/L Final     Anion Gap   Date Value Ref Range Status   06/28/2024 11 8 - 16 mmol/L Final   09/24/2012 12 5 - 15 meq/L Final     eGFR if    Date Value Ref Range Status   07/23/2022 >60.0 >60 mL/min/1.73 m^2 Final     eGFR if  non    Date Value Ref Range Status   07/23/2022 >60.0 >60 mL/min/1.73 m^2 Final     Comment:     Calculation used to obtain the estimated glomerular filtration  rate (eGFR) is the CKD-EPI equation.        @labrcntip(troponini)@    BNP   Date Value Ref Range Status   06/13/2024 141 (H) 0 - 99 pg/mL Final     Comment:     Values of less than 100 pg/ml are consistent with non-CHF populations.   }   Lab Results   Component Value Date    CHOL 218 (H) 10/25/2023    CHOL 242 (H) 10/05/2023    CHOL 146 03/01/2023     Lab Results   Component Value Date    HDL 74 10/25/2023    HDL 81 (H) 10/05/2023    HDL 60 03/01/2023     Lab Results   Component Value Date    LDLCALC 129.0 10/25/2023    LDLCALC 142.4 10/05/2023    LDLCALC 73.4 03/01/2023     Lab Results   Component Value Date    TRIG 75 10/25/2023    TRIG 93 10/05/2023    TRIG 63 03/01/2023     Lab Results   Component Value Date    CHOLHDL 33.9 10/25/2023    CHOLHDL 33.5 10/05/2023    CHOLHDL 41.1 03/01/2023      Last Echo: 9/2022  Last stress test: 9/2021, Lexiscan  Cardiovascular angiogram: none  ECG: NSR, rate 79. LAFB, IRBBB, LVH, QTc 477 msec.  Fundoscopic exam: biannually, no retinopathy, being treated for glaucoma.    In 6/2014:  Hospitalized 6/3/2014 for acute right sided weakness and slurred speech  LEONARDO Jens Dela Cruz is a 69 y.o. female admitted to the services of hospital medicine via the ER for acute CVA. Presented with difficulty speaking, facial drooping and weakness of the right upper and lower extremities. She missed the time window for tPA. ST/PT/OT as well as cardiology and neurology were consulted. Dr. Jurado of cardiology managed A fib. Patient rapidly improved functioning with PT/OT/ST. Currently her goals with PT are met as she is ambulating over 400 feet independently.  She was not approved for IP rehab and refuses SNF. She will be discharged home with outpatient PT/ST/OT evaluation and treatment.    Neurocardio work up  CT head -  Mild involutional changes and findings suggesting mild microvascular ischemic change with no acute intracranial findings    Carotid US - No hemodynamically significant stenosis is noted by velocity criteria involving either internal carotid artery.  A hemodynamically significant stenosis is defined as a stenosis of greater than 50% of the internal carotid artery vessel lumen    ECHO, 6/4/2014, CONCLUSIONS     1 - Concentric hypertrophy. Wall thickness is mildly increased, with the septum and the posterior wall each measuring 1.1 cm across.    2 - Normal left ventricular systolic function (EF 60-65%).     3 - Mild mitral regurgitation.     4 - Left ventricular diastolic dysfunction.     5 - Pulmonary hypertension. estimated PA systolic pressure is 64 mmHg.    6 - Normal right ventricular systolic function .     7 - Suggestion for increase in LV mass from echo on 3/18/2014..     Echo bubble study also negative. Had episode of PAF during monitoring and convert with restart of Flecainide.   Since DC, improving strength in the right hand, right leg feels normal but tire easier. Speech improving. To receive PT/OT/speech today.  Medications reviewed - will DC ASA for now, and know the effects of the Limbitrol and Ativan, uses prn rarely. Some loss of appetite and taste.    Active problems in hospital  Acute left hemispheric CVA, MRI suggest multiple areas, was not on OAC nor antiplatelet Rx  PAF with high CHADS-VAS score, post ablations and DCCs  HTN with LVH  Interstitial lung disease  Pulmonary hypertension  Hyperlipidemia was not on statin    Since visit of 6/20, problem with peripheral swelling, now taking Lasix daily. Last hospitalization / CMP, 6/3 showed K+ 3.4 with normal renal function. Also noted AVILA along with exertional chest heaviness, on-and-off over past several years. Noted it with walking and have to rest. Can last up to an hour. Max grade 10/10, yesterday during the day.  in hospital. Also quite  anxious, stopped Limbitrol due to side effects, managed by Dr. Olivo. Quite sedentary and major decrease in appetite, due to depression. No Psychiatrist. Home BP high 175/97. ECG shows NSR, rate 61, right axis, nonspecific T waves abnormalities, prolong QTc 483 msec. Low anterior forces.    Holter, 6/30/2014:  PREDOMINANT RHYTHM  1. Sinus rhythm with heart rates varying between 43 and 67 bpm with an average of 55 bpm.     VENTRICULAR ARRHYTHMIAS  1. There were frequent polymorphic PVCs totalling 1388 and averaging 40 per hour.  There was 1 couplet.    2. There were no episodes of ventricular tachycardia.    SUPRA VENTRICULAR ARRHYTHMIAS  1. There were very rare PACs totalling 47 and averaging 1 per hour.     2. There were no episodes of sustained supraventricular tachycardia.    SINUS NODE FUNCTION  1. There was no evidence of high grade SA khai block.     AV CONDUCTION  1. There was no evidence of high grade AV block.     DIARY  1. The diary was returned, but not completed    MISCELLANEOUS  1. This was a tape of adequate length (34 hrs).     Since visit of 7/24, better, reviewed by Dr. Olivo and started antidepressant Lexapro. BMP still showed mild hypokalemia of 3.3, not using Lasix at this time. Still feeling fatigue, anxiety, and request refill for Nexium. Discussed need for GI review on chronic PPI. Lack of appetite.  Lexiscan, 7/30/2014, - Nuclear Quantitative Functional Analysis:   LVEF: 66 % (normal is 55 - 69)  LVED Volume: 50 ml (normal is 60 - 98)  LVES Volume: 17 ml (normal is 20 - 42)    Impression: NORMAL MYOCARDIAL PERFUSION  1. The perfusion scan is free of evidence for myocardial ischemia or injury.   2. There is a mild intensity fixed defect in the anteroseptal wall of the left ventricle, secondary to breast attenuation.   3. Resting wall motion is physiologic.   4. Resting LV function is normal.  (normal is 55 - 69)  5. The ventricular volumes are normal at rest and stress.   6. The  "extracardiac distribution of radioactivity is normal.     No problem with spironolactone, BP improved, home BP similar to office readings.    Since visit of 8/14, had some distress and home monitor showed HR of 40, felt "bad" and nervous to ED, note reviewed, ECG and final pulse count 57-58. Labs reviewed - normal renal function and K+ 3.8. Still taking one tab of K+ daily. Not using Lasix. Explained HR discrepancy may be due to VPCs. Reviewed by Ms. Fermin and Lexapro increased to 20 mg, 2 days ago. Feeling "a lot better". Sleeping better. Still sedentary but ready to be more active. Eating better have lost additional 5 lbs since 8/2014.    Since visit of 9/5, still with speech therapy, noted progressive near syncope with SOB and irregular heart beats. Also noted some ankle swelling over the past 2 weeks. ECG shows marked bradycardia, rate 48, right axis, pulmonary pattern, 1st degree AVB. Wellbutrin 100 mg daily along with Lexapro 20 mg by Ms. Fermin NP. BMP showed K+3.5. To use Lasix PRN    Since visit of 9/25, still with marked AVILA, only able to walk about 30' before having to stop. Bothered by increase palpitations during tapering of BB. No definite lung problem, evaluated this year. ECG shows sinus dewayne, rate 53, VPC, RBBB with suggestion of septal MI. No evidence for anemia - CBC 9/3/2014 was normal. Just started doing some activities with arm and leg exercise for the last 2 weeks, Doing some OT alternating with PT every other day.  CXR, 6/3/2014 - Lungs are clear of infiltrate and costophrenic sulci are sharp.  The cardiomediastinal silhouette appears stable.    PET scan, 4/2014 - 1.A hypermetabolic left pulmonary mass is noted with an SUV of 3.0 maximally.  A neoplastic, infectious, or granulomatous process is on the differential. Clinical correlation is suggested.  Close follow-up for resolution or biopsy would be suggested    2.  Elevated right-sided heart pressures are suspected with a large right " "atrium    3.  Right-sided pleural effusion    4.  Hypermetabolic thyroid gland asymmetrically on the right.  This may relate to thyroiditis, Graves' disease, or neoplastic process.  Ultrasound may be helpful.    5.  Small hypermetabolic focus in the expected location of the left ovary, a female pelvis ultrasound may be helpful for further evaluation.  This could relate to a uterine fibroid that has necrosed or infarcted    Biopsy of lung nodule on 5/1/2014 - negative for CA    CTA, 4/2014 - No evidence of pulmonary thromboembolism.    2.  Enlarged cardiac silhouette and secondary findings of elevated right heart pressures with diffuse mosaic lung pattern and right basilar pleural fluid suggesting cardiac decompensation/heart failure.    3. Unchanged 1 cm nodular density within the left upper lobe which previously demonstrated hypermetabolism by PET/CT and unchanged mediastinal lymphadenopathy.    4.  Subpleural nodular density within the right upper lobe is unchanged when compared with the previous study and could represent an area of remote fibrotic scarring.    5.  Heterogeneous appearance of the spleen, possibly due to the phase of contrast enhancement.  Evolving splenic infarction is not entirely excluded.    6. Suggestion of colonic wall thickening involving the descending colon.  Please correlate for symptoms of colitis.    Since visit of 10/14, rehospitalized for HF on 10/26 to 10/29/2014.  DCS - "Ayla Dela Cruz is a 69 y.o. female admitted to the services of hospital medicine via the ER for acute diastolic heart failure. C/o severe SOB and increase in leg swelling over the past two days. Under the care of Dr. Jurado. Recently was taken off metoprolol. History of paroxsymal atrial fib. Hospitalized here in June in this year for acute CVA. It was at that time, pt started Xarelto. Deficits has resolved. No history of heart failure.     Hospital Course: The patient was admitted with acute CHF biventricular and " "mild elevation of her troponin's at 0.029 - 0.035 and therefore an anginal equivalent was suspected. The patient also had significant hypertension upon admission and although she was not in atrial fibrillation, her EKG showed a significant first degree AV block and RBBB. Discussion was made as to whether or not to decrease the patient flecainide; however due to the risk of her going back into atrial fibrillation this was not done. Upon discharge the patient's lisinopril was increased to 10 mg and aldactone was added."    RHC done HEMODYNAMIC RESULTS:    LVEDP (Pre): 24 mmHg  BP: 166/104    Air rest:  PA: 90/34 (54)  PW:  (24)  RVEDP: 16  RA:  (16)    C. SUMMARY:    1. Diastolic dysfunction.  2. - Kee CO 2.64 L/min  3. - Mean systemic pressure 108.6 mmHG, stage II HTN  4. - SVR 41.16 Wood Units  5. - PVR 11.36 Wood Units  6. - Pulmonary HTN due to left sided heart disease and stage II HTN    D. RECOMMENDATIONS:    1. Routine post-cath care.  2. Cardiac rehab referral.  3. Follow up with Dr. Barrett Jurado.  4. - Aggressive BP lowering and diuresis    Repeat ECHO 11/5/2014 CONCLUSIONS     1 - Concentric remodeling. Septal wall thickness is increased, and posterior wall thickness is mildly increased, with the septum measuring 1.3 cm and the posterior wall measuring 1.1 cm across.    2 - Mildly to moderately depressed left ventricular systolic function (EF 40-45%).     3 - Left ventricular diastolic dysfunction. E/e'(lat) is 16    4 - Right ventricular enlargement with mildly depressed systolic function.     5 - Pulmonary hypertension. estimated PA systolic pressure is 56 mmHg.     6 - Intermediate central venous pressure.     7 - No evidence of intracardiac shunt.     8 - No significant change from Echo of 6/4/2014.    Active problems:  Progressive severe SOB, functional class IV  Diastolic dysfunction, elevated BNP and Troponin  Hypokalemia due to diuretics  Secondary Pulmonary HTN with peripheral edema  HTN  History of " "CVA 6/3/2014  PAF controlled with Flecanide  Marked bradycardia with BB  On OAC  Hematuria    At home receiving PT, OT and speech Rx twice a week, with  nurse once a week, no problem with medications. Feeling better, sleeping well. Home /73.    Since visit of 11/14, feeling "much better", concern of occasional HTN, home BP /66-99, HR . Lot more active, no problem. Not using walker, no CP, SOB, nor dizziness. Recent BMP - normal renal function and K+ 4.1.    Since visit of 12/19/2014, worry about HTN home readings similar to office up to . Morning reading is higher. No HA nor visual abnormalities. Xanax has helped but afraid to use too much. ECG shows sinus arrhythmia, rate of 65, late transition and LAFB. Also bothered with dry cough with the Lisinopril. And started to have return of arm pains that was attributed to atorvastatin, on Crestor. Discussed options.     Since visit of 1/16/2015, noted frequent nocturia, 8-10 times, taking losartan-HCT at night time. Have stopped using Lasix and spironolactone for past 2 months. More active without much problem. Labs normal renal function, K+ 4.2, BNP now 37, A1C 5.6%.    Since visit of 2/18/2015, improving, no further dizziness, some SOB when "stressed", usually helped with alprazolam, use only prn, about 3-6 times weekly. Compliant with medications. Been off Crestor for 6 weeks with concern of muscle problem. Home BP is fine, similar to office.     Since visit of 4/15, appetite returned, feeling a lot better. Active, walking twice a week for about half an hour. Also do calisthenic about twice a week, no problem. Seeing Dr. Zavaleta for generalized pruritis for past 3 months. Cardiac wise less AVILA. Sleeping well. Labs in 5/2015, normal CBC without eosinophilia, thyroid normal, ferritin level low normal at 21. Off Crestor for couple months due to fear of muscle pain but did not find much difference being off medication. Last Lipid in 11/2014 was " "good, on daily dose of Crestor. Home /79.    Since visit of 7/2/2015, feeling "great", very active without problem, no more AVILA nor dizziness with standing. Compliant with medications, don't miss. Using Tylenol 500 mg BID for arthritis. Notice easy bruising on Xarelto. Home /10.  Holter - PREDOMINANT RHYTHM  1. Sinus arrhythmia with heart rates varying between 46 and 110 bpm with an average of 73 bpm.     VENTRICULAR ARRHYTHMIAS  1. There were very rare PVCs recorded totalling 8 and averaging less than 1 per hour.     2. There were no episodes of ventricular tachycardia.    SUPRA VENTRICULAR ARRHYTHMIAS  1. There were very frequent PACs totalling 3054 and averaging 132 per hour.  There were 29 bigeminal cycles.  There were 103 couplets.    2. 3% of complexes.    3. There were no episodes of sustained supraventricular tachycardia.    SINUS NODE FUNCTION  1. There was no evidence of high grade SA khai block.     AV CONDUCTION  1. There was no evidence of high grade AV block.     2. The longest RR interval was 1565 msec.     DIARY  1. The diary was properly completed.     2. There was 1 episode of skipping beats reported. The corresponding rhythm strips revealed the following:             During event 1 the rhythm was sinus rhythm at 75 bpm.     3. There was 1 episode of skipped beat reported. The corresponding rhythm strips revealed the following:             During event 1 the rhythm was sinus arrhythmia at 63 bpm.     MISCELLANEOUS  1. This was a tape of adequate length (23 hrs).     Since visit of 10/15, place on new antidepressant, Venlafaxine 75 mg daily, tried 2 and developed rapid HR to 125 bpm, feels more anxious, now switched to Citalopram. Also noted the daily dose of Lorazepam does not seem adequate. Orthostatic VS is positive with lying 142/73, , standing 120/62, . Been taking spironolactone 25 mg for last 3 days. Does feel thirsty. Labs reviewed A1C 5.8%, CBC, BMP were WNL. Lipid " "shows LDL 99, non-. Goal preferred is <70 and < 100. No problem with 10 mg of Crestor. Had vacation at [place] and [place], walked all over without problem.    Since visit of 1/18/2016, no new problem. 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 Labs with , non-, hsCRP at 2.56.     In 10/2016, had acute GB attack with rupture in 8/2016, then tried on Wellbutrin took only 1-2 tabs and BP up to 201/100 with head tightness. Subsequently back to normal, the last 2.5 days took Losartan bid. Discussed Rx options for HTN. Advised to be more active, regular exercise. Lab reviewed LDL in 8/2016 93.     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 Still with daily palpitations, last up to half hours. Have electronic watch, David Barroso, since 9/2016, suppose harmonize patient with the universe. Feeling better if on during the day. Lot of walking at school, no problem.   Holter in 10/2016 - PREDOMINANT RHYTHM  1. Sinus rhythm with heart rates varying between 52 and 144 bpm with an average of 75 bpm.     2. Paroxysmal atrial fibrillation    VENTRICULAR ARRHYTHMIAS  1. There were occasional mostly monomorphic PVCs totalling 596 and averaging 13 per hour.     2. There were no episodes of ventricular tachycardia.    SUPRA VENTRICULAR ARRHYTHMIAS  1. There were frequent PACs totalling 2982 and averaging 69 per hour.  There were 69 bigeminal cycles.  There were 55 couplets.    2. There were no episodes of sustained supraventricular tachycardia.    3. Paroxysmal atrial fibrillation was noted in the the study.     SINUS NODE FUNCTION  1. There was no evidence of high grade SA khai block.     AV CONDUCTION  1. There was no evidence of high grade AV khai filtering.     2. The longest RR interval was 1725 msec.     DIARY  1. The diary was returned, but not completed    MISCELLANEOUS  1. This was a tape of adequate length (43 hrs).     ECHO CONCLUSIONS     1 - Hyperdynamic left ventricular " "systolic function (EF 65-70%).     2 - Normal left ventricular diastolic function.     3 - Normal right ventricular systolic function .     4 - Pulmonary hypertension. The estimated PA systolic pressure is 64 mmHg.     5 - Less evidence for LVH from Echo in 11/2014.     In 5/2017, bothered by recurrent PAF with AVILA after 10 feet walking. Felt better before on Flecainide 100 mg bid, but Holter continued to show PAF. Attempt up titration, problem with itching, switched to propafenone 150 mg tid, continued with itching and reviewed by allergist, treated with 10 days of cortisone with benefit. No hive and no itching, just don't feel good due to the irregular rhythm. History reviewed, had 2 prior AF ablation, last in [place], [place] in around 2000, recall being told not to do again. Recall seeing an [facility] EP doc but didn't like him. Discussed various options. Will stop antiarrhythmic for now, will be going on a 16 days trip to [place]. Reviewed prior medications, have taken Tenormin, metoprolol tartrate, and short-acting diltiazem in the past without problem. Refer to Dr. Calderon for review. ECG in AF, rate 99, PRWP, LAFB    In 11/2017, post AF ablation, felt good for a few weeks, noted some SOB and had review with Dr. Calderon on 11/1 - 72 year old female with a longstanding history of atrial arrhythmias. Her [id number] Score is 5 (age, female, TIA, HTN) so she should remain on anticoagulation indefinitely. She has failed Flecainide. She is now 6 weeks post-PVI and MA flutter line, and maintaining sinus rhythm on low-dose Amiodarone. Given her intermittent AVILA which started post-ablation, I have stopped Amiodarone which I think is the likely culprit. I instructed her to continue taking Lasix PRN, as well as metoprolol and Xarelto." ECG on 11/1 was in NSR, rate 61, PRWP.  Recommended to stop amiodarone but then started to feel the palpitation daily, felt nervous and at the same time being taper down on the nightly Ativan. " "Did have some breakthrough anxiety attacks. Also had strained the upper back and right arm / shoulder, requesting some Tramadol, tried Jan's Tramadol with good relief. Understand this is an opoid. Used Excedrin with caffeine and bothered by more palpitations.    In 3/2018, here for recurrent palpitations, no inciting factors over the past 6 weeks. Had review with Dr. Calderon in 2/2018 - "72 year old female with a longstanding history of atrial arrhythmias and prior ablations at outside institutions. Her [id number] Score is 5 (age, female, TIA, HTN) so she should remain on anticoagulation indefinitely. She is now 5 months post-PVI and MA flutter line, and maintaining sinus rhythm off Amiodarone. The plan is to continue Xarelto, and to see me again in 6 months." Palpitations appears associated with AVILA, had difficulties walking in house or up a ramp. Started 100 mg of amiodarone last week, daily, feels some benefit. ECG today in Sinus rhythm vs wandering atrial pacemaker with frequent PJC, rate 59. Also taking Losartan in the morning appears more effective.  Holter 11/2017 - PREDOMINANT RHYTHM  1. Sinus arrhythmia with heart rates varying between 39 and 102 bpm with an average of 58 bpm.     VENTRICULAR ARRHYTHMIAS  1. There were occasional PVCs totalling 728 and averaging 15 per hour.  There were 5 bigeminal cycles.     2. There were no episodes of ventricular tachycardia.    SUPRA VENTRICULAR ARRHYTHMIAS  1. There was no supraventricular ectopic activity.    SINUS NODE FUNCTION  1. There was no evidence of high grade SA khai block.     AV CONDUCTION  1. There was no evidence of high grade AV block.     2. The longest RR interval was 1820 msec.     DIARY  1. The diary was not returned    MISCELLANEOUS  1. There were occasional hookup related artifacts. Overall, the study was of good quality.     2. This was a tape of adequate length (48 hrs).     in 5/2018, no new problem, still concern of AVILA, usually with " "walking, variable as little 10 feet or fine with some long walk, can play flute for an hour but find difficulties in holding a note. Off daily amiodarone, uses it prn for palpitation, find helpful. Labs reviewed from 1/2018, TSH, Vitamin D, LDL 68.2, A1C 5.9%, CMP - normal renal function, K+ 3.6. To go to [place] for a month in 8/2018.    In 9/2018, return from a month family reunion trip to [place]. Problem with hypotension with Tramadol and Losartan. Had review with Dr. Calderon on 9/5 "Ayla Dela Cruz is a 73 year old female with a longstanding history of atrial arrhythmias and prior ablations at outside institutions. Her [id number] Score is 5 (age, female, TIA, HTN) so she should remain on anticoagulation indefinitely. She is now 11 months post-PVI and MA flutter line, and maintaining sinus rhythm off Amiodarone. However, she is feeling poorly, with frequent PACs and borderline hypotension. The plan is to stop Losartan, echo in next several weeks, Holter monitor in 6 weeks, and follow-up with me in 8 weeks."  Off both medication on 8/31, no problem now. No heart worry. Denies any CP nor SOB. Still teaching. ECG on 9/5 shows NSR with sinus arrhythmia.    In 3/2019, return for follow up, had syncopal spell with hypotension at [facility] required reconstruction of the right ankle, now in PT, doing well. No heart complication, no AF. LDL 90.6 in 1/2019. 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 No heart worries, no CP nor SOB, much better, breathing improved with daily Breo use. Discussed option with NOAC.    In 9/2019, 6 months review, concern of bruising with Xarelto, recall problem with dark stool with Eliquis. Discussed option.     In 1/2020 return due to allergic reaction to Pradaxa, had to stop Eliquis for similar problems - hives and rash, difficult to sleep. Also problem with embolism when off NOAC for 7 days. Discuss alternatives.    In 3/2020, same problem with Savaysa, noted about an " "hour of PAF this morning, felt nervous. Troubled by recent viral infection and also pain injection with steroid, which helped the rash temporary. Would like to proceed with mechanical alternative. History include past use of Coumadin for about 2 years, had some difficult with GIB and also required up to 3 times a week testing.    In 8/2020, here for 6-months review. Troubled more with JOSE with quarantine. Working with therapist. Keeping busy with working on a new book, music also. No heart worries.    In 8/2021, return for annual review. Problem with requiring medication changes for major depression with anorexia and malnutrition. Weight loss of 20 lbs and now improving. No cardiac issue except for slow heart rate down to 52 bpm, asymptomatic.    Fco Calderon MD noted "75 year old female with a longstanding history of atrial arrhythmias and prior ablations at outside institutions. Her [id number] Score is 5 (age, female, TIA, HTN) so she should remain on anticoagulation indefinitely. She is now >3 years post-PVI and MA flutter line, and is maintaining sinus rhythm off Amiodarone.      Ms. Dela Cruz had a likely cardioembolic event after stopping AC for a week. Ms. Dela Cruz wants to be on an anticoagulant she can crush. I have switched her back to eliquis 5 mg bid. She thinks her pruritis was from [employer] laundry detergent and not eliquis.     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 I have told her to contact Dr. Pedraza if she decides she can no longer take anticoagulants so she can discuss details of procedure with him further."    In 1/2022, return for follow up. Post PPM in 10/2021, one episode of near syncope with head injury, no clear etiology, no problem over the past 2 months. Noted some " morning dizziness with  to 110 and HR in the 80s.. Not as active as before and also noted some cognitive dysfunction with loss of ID. No cardiac complaints. Medication reviewed, on low-dose BB for PAF rate control.    Echo 2/2021 - Mild concentric hypertrophy and normal systolic function. The estimated ejection fraction is 60%  There is no evidence of intracardiac shunting.  Small circumferential pericardial effusion.  Mild aortic regurgitation.  Moderate tricuspid regurgitation.  Mild to moderate pulmonic regurgitation.  There is moderate tricuspid valve stenosis.  Moderate right atrial enlargement.  Normal right ventricular size.  Mild mitral regurgitation.  Mild left atrial enlargement.  Intermediate central venous pressure (8 mmHg).  The estimated PA systolic pressure is 53 mmHg.  There is pulmonary hypertension.  Normal left ventricular diastolic function.    Lexiscan 9/2021 - Impression:     1.  Scintigraphically negative for ischemia.  2. Fixed defect along the septal wall is suspicious for a small infarct.  3. The global left ventricular systolic function is normal with an LV ejection fraction of 80 % and no evidence of LV dilatation.  4. Wall motion is normal.    In 7/2022, return post eye operation, unable to be active due to vision, now able to see. Very weak and frail due to anorexia and sedentary status. Noted some heart racing for past 2 days. Back to NSR today. Under nutritional review.    In 12/2022, return for 6-months follow-up. Noted some AMS with standing and lower BP, now weaning off amitriptyline. Also working with eating disorder and is eating better. Working with new Psychiatrist. Discussed pulmonary hypertension, too frail for medications.      Echo 9/2022 - The left ventricle is normal in size with moderate concentric hypertrophy and normal systolic function.  The estimated ejection fraction is 60%.  Indeterminate left ventricular diastolic function.  Normal right ventricular size  "with normal right ventricular systolic function.  Mild left atrial enlargement.  Moderate right atrial enlargement.  Mild aortic regurgitation.  Mild mitral regurgitation.  Moderate tricuspid regurgitation.  Mild pulmonic regurgitation.  Normal central venous pressure (3 mmHg).  The estimated PA systolic pressure is 60 mmHg.  There is pulmonary hypertension.    PPM check 11/2022 - RA pacing 32%, RV pacing 7.9%   Atrial arrhythmias: AMS b36--kxdffpf at 2m 40s w/egms c/w afib/CVR   AT/AF burden: <1%  Battery status/longevity (estimated): 8.2-10 years      In 6/2023, doing as well as she can, kept weight stable with Boost Max. No heart worries.    Fco Calderon MD noted 3/2023 "76 year old female with a longstanding history of atrial arrhythmias and prior ablations at outside institutions. Her [id number] Score is 5 (age, female, TIA, HTN) so she should remain on anticoagulation indefinitely. She is now 4 years post-PVI and MA flutter line, and was maintaining sinus rhythm off Amiodarone until 9/2021. She is now status-post dual chamber pacemaker implantation. AMS burden <1%     Plan is to hold the course and see me again in 1 year. Should she have increased AF burden the plan will be to start sotalol 40 mg bid.     Ms. Dela Cruz had a likely cardioembolic event after stopping AC for a week. Given her history of CVA of unclear etiology but possibly cardioembolic, I think the best course is for her to continue anticoagulation indefinitely, and only consider a Watchman device should she ever develop an absolute contraindication to oral anticoagulation."    PPM check 5/2023 - Presenting egram demonstrates Ap/Vs w/ PAC's   Device function WNL   Autocapture algorithms are RA, RV (OFF).   RA pace 77%, RV pacing 2%   Atrial arrhythmias: AMS x 16, MAX 23 mins 16 sec, Overall burden <1% Overall VRs controlled   Anticoagulation status: Eliquis   Ventricular arrhythmias: None   Battery status/longevity: 7.7-9 years   Return to " "clinic in May 2024      LE arterials 3/2023 - No elevated peak systolic velocities suggesting hemodynamically significant narrowing.  Normal GODFREY on the right.  GODFREY on the left is borderline normal.     In 12/202, return for 3-months review. Concern of labile HR from 69 to 107 accompanied by anxiety and panic. Helped with Ativan. Galo Lipscomb, PhD, MP suggest use of propranolol for her HR.     PPM check 8/2023 - Presenting egram demonstrates ASVS   Device function WNL   RA pace 49%, RV pacing 1.5%   Autocapture algorithms are Off   Atrial arrhythmias:  AF burden <1%, max duration 37 minutes.   Anticoagulation status:  Eliquis   Ventricular arrhythmias:  NONE   Battery status/longevity:  7.6-9.2 yrs    Yesenia Duron, NP, EP noted 10/5/2023, virtual visit.   78 y.o. female with HTN, HLD, TIA, AFL (RFA 1990), AF (PVI 1997, PVI 2017), renal infarction, PPM with a telemedicine visit for follow up.   Pt experiencing irreg HB, palps, SOB ~ 2 weeks ago which have improved. No recent alerts from device and remote monitor is functional. Last report shows low (<1%) AF burden. 49% AP, <1% .  Discussed her symptoms could be brief AF vs PMT vs RR vs ectopy. If they recur would recommend 48hr Holter to evaluate symptom-rhythm correlation. [facility]     RTC as previously scheduled in March 2024 in [place]."    HPI comments: in 7/2024, return for 6-months review. More SOB recently with abnormal CXR and started on diuretic by Lars Bryson MD with definite improvement. No other CV issues, remains mostly sedentary but enjoying living.    CXR 5/2024 - Increased size of the heart with cardiomegaly. Pacemaker in place. Increased bibasilar lung density may represent atelectasis or a pneumonitis. Trace left pleural effusion.    76 year old female with a longstanding history of atrial arrhythmias and prior ablations at outside institutions. Her [id number] Score is 5 (age, female, TIA, HTN) so " "she should remain on anticoagulation indefinitely (eliquis 2.5 mg bid). She is now 4 years post-PVI and MA flutter line, and was maintaining sinus rhythm off Amiodarone until 9/2021. She is now status-post dual chamber pacemaker implantation. AMS burden <1%     Plan is to hold the course and see me again in 1 year. Should she have increased AF burden the plan will be to start sotalol 40 mg bid.     Ms. Dela Cruz had a likely cardioembolic event after stopping AC for a week. 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     Fco Calderon MD noted 3/2024 "76 year old female with a longstanding history of atrial arrhythmias and prior ablations at outside institutions. Her [id number] Score is 5 (age, female, TIA, HTN) so she should remain on anticoagulation indefinitely (eliquis 2.5 mg bid). She is now 4 years post-PVI and MA flutter line, and was maintaining sinus rhythm off Amiodarone until 9/2021. She is now status-post dual chamber pacemaker implantation. AMS burden <1%     Plan is to hold the course and see me again in 1 year. Should she have increased AF burden the plan will be to start sotalol 40 mg bid.     Ms. Dela Cruz had a likely cardioembolic event after stopping AC for a week. 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     ROS     Constitutional: negative for chills, have chronic fatigue, no fevers, sweats, no further slurred speech, and no dysarthria, have improved appetite with JOSE, intolerance to heat, bruises easily on NOAC and steroid, weight stable from 12/2023, " diet  Eyes: negative for icterus, redness and visual disturbance, have visual halo,   Respiratory: negative for asthma, cough have resolved off ACE-I, no dyspnea on exertion, SOB with HR 85 to 100 bpm, have lifelong snoring, Rose score 6 improved down to 0 on CPAP, 86%, no hemoptysis, pleurisy/chest pain and wheezing  Cardiovascular: negative for chest pain, chest pressure/discomfort, no further orthostatic dizziness, and still some palpitations but have irregular heart beats, no paroxysmal nocturnal dyspnea and syncope  Gastrointestinal: negative for abdominal pain, nausea and vomiting, no further GERD and dysphagia (psychologic), have hemorrhoids  Musculoskeletal:positive for arthralgias, and less right sided muscle weakness with improvement in leg strength, improved bilateral ankle pains - OA and no myalgias  Neurological: negative for coordination problems, gait problems, headaches, paresthesia, have some tremors but able to draw, loss of voice at times, and no vertigo  Psych: positive for depression, nervousness, anxiety, less stressed due to 's have improved  Skin - no further pruritic rash, have dryness    Answers submitted by the patient for this visit:  Review of Systems Questionnaire (Submitted on 6/27/2024)  activity change: Yes, more sedentary  unexpected weight change: No  neck pain: No  hearing loss: No  rhinorrhea: No  trouble swallowing: No  eye discharge: No  visual disturbance: No  chest tightness: No  wheezing: No  chest pain: No  palpitations: No  blood in stool: No  constipation: No  vomiting: No  diarrhea: No  polydipsia: No  polyuria: No  difficulty urinating: No  hematuria: No  menstrual problem: No  dysuria: No  joint swelling: No  arthralgias: Yes  headaches: No  weakness: Yes  confusion: No  dysphoric mood: No    Objective:    Physical Exam - orthostatic VS: sitting 117/61, standing 116/70    General appearance: alert, appears stated age, cooperative and no distress, improved  "skin turgor, RA O2 sat. 98%  Head: Normocephalic, without obvious abnormality, atraumatic  Eyes:  conjunctivae/corneas clear. PERRL, EOM's intact.    Neck: no adenopathy, no carotid bruit, no JVD, supple, symmetrical, trachea midline and thyroid not enlarged, symmetric, no tenderness/mass/nodules  Lungs:  clear to auscultation bilaterally  Chest wall: no tenderness  Heart: regular rate and rhythm, normal S1, S2 normal, click, rub or gallop    Abdomen: soft, non-tender; bowel sounds normal; no masses,  no organomegaly, waist 31" down to 27" hip 42"  Extremities: extremities no further weakness of the right upper extremity, atraumatic, no cyanosis and have no edema, minor varicose veins  Pulses: 2+ and symmetric    Assessment:       1. Paroxysmal atrial fibrillation, ablations X 3 1995, 1997, 9/2017, CHADS-VAS score 5, HAS-BLED score 5     2. AVILA (dyspnea on exertion)    3. Anticoagulant long-term use    4. Elevated brain natriuretic peptide (BNP) level, range 98 - 1045    5. Sedentary lifestyle, 6/2014    6. Primary hypertension    7. Pure hypercholesterolemia    8. Pulmonary hypertension, without RV dysfunction    9. H/O: CVA (cerebrovascular accident), June 2014    10. Hypertensive left ventricular hypertrophy with heart failure    11. S/P ablation of atrial flutter    12. Iron deficiency anemia due to chronic blood loss    13. Long term (current) use of anticoagulants    14. Cardiac pacemaker in situ, [employer], dual chamber, 10/14/2021    15. Cognitive impairment    16. Frail elderly    17. Chronic fatigue    18. Benzodiazepine dependence, Ativan 0.5 mg daily since 2021    19. NILSA on CPAP, using 53% to 70%         Plan:       Ayla was seen today for follow-up.    Diagnoses and associated orders for this visit:    Paroxysmal atrial fibrillation, ablations X 3 1995, 1997, 9/2017, CHADS-VAS score 5, HAS-BLED score 5   -     IN OFFICE EKG 12-LEAD (to Muse)    AVILA (dyspnea on exertion)  -     Echo; " Future    Anticoagulant long-term use    Elevated brain natriuretic peptide (BNP) level, range 98 - 1045  -     Echo; Future    Sedentary lifestyle, 6/2014    Primary hypertension    Pure hypercholesterolemia    Pulmonary hypertension, without RV dysfunction    H/O: CVA (cerebrovascular accident), June 2014    Hypertensive left ventricular hypertrophy with heart failure    S/P ablation of atrial flutter    Iron deficiency anemia due to chronic blood loss    Long term (current) use of anticoagulants    Cardiac pacemaker in situ, [employer], dual chamber, 10/14/2021    Cognitive impairment    Frail elderly    Chronic fatigue    Benzodiazepine dependence, Ativan 0.5 mg daily since 2021    NILSA on CPAP, using 53% to 70%    - All medical issues reviewed, will continue current Rx  - Warning signs of MI and stroke given, if symptoms last more than 5 minutes, stop immediately and call 911, then chew 2-4 low-dose ASA (81 mg).  - Consider use of Potassium chloride salt substitute, [employer] Nu-Salt.   - Need to remain well hydrated but avoid drinking within 4 hours of bedtime. Recommend at least 90 oz of fluid daily per IOM (institute of Medicine) suggestion.  - CV status and off antiarrhythmic, all medications reviewed, patient acknowledge good understanding.  - Recommend healthy living: healthy diet and regular exercise aiming for fitness, restorative sleep and weight control  - Recommend using Tylenol as first line pain medications.  - Instruction for Mediterranean, high potassium diet and heart healthy exercise given.  - Need good exercise program, 4 key elements: 1. Aerobic (walking, swimming, dancing, jogging, biking, etc, 2. Muscle strengthening / resistance exercise, need to do 2-3 times weekly, 3. Stretching daily, good stretch takes a whole  total minute. 4. Balance exercise daily.  - Encourage activities as much as tolerated. Any activity is better than none!   - Highly recommend 30-60 minutes of exercise daily,  can have Sunday off, with 2-3 sessions of muscle strengthening weekly. A  would be very helpful.  - Recommend at least biannual cardiovascular evaluation in view of her significant risk factors, patient 's preference.  - Need to go visit [place], 2 weeks to 3 months.    Patient Active Problem List   Diagnosis    Paroxysmal atrial fibrillation, ablations X 3 1995, 1997, 9/2017, CHADS-VAS score 5, HAS-BLED score 5     Hypertension, 1985    Hyperlipidemia, baseline     GERD (gastroesophageal reflux disease)    Glaucoma (increased eye pressure)    Fibroid uterus    Thickened endometrium    Abnormal CT scan, chest    Interstitial lung disease    Pulmonary hypertension, without RV dysfunction    H/O: CVA (cerebrovascular accident), June 2014    LVH (left ventricular hypertrophy) due to hypertensive disease    Sedentary lifestyle, 6/2014    AVILA (dyspnea on exertion)    OA (osteoarthritis)    Chronic diastolic heart failure, 2014    Elevated brain natriuretic peptide (BNP) level, range 98 - 1045    Microalbuminuria    Hypoalbuminemia due to protein-calorie malnutrition    S/P ablation of atrial flutter    JOSE (generalized anxiety disorder)    Other spondylosis, lumbar region    Cervical spondylosis    Medication intolerance    Anticoagulant long-term use    GERD (gastroesophageal reflux disease)    Mild intermittent asthma without complication    Elevated troponin    BMI less than 19,adult    Localized hives    Elevated LFTs    Iron deficiency anemia due to chronic blood loss    Recurrent major depressive disorder    Gastroenteritis    Other spondylosis, cervical region    History of DVT (deep vein thrombosis)    Chronic sinus complaints    Second degree AV block, Mobitz type I    Anemia    Syncope    Protein calorie malnutrition    At risk for cardiovascular event    Atrial fibrillation    Long term (current) use of anticoagulants    Atrial arrhythmia    Cardiac pacemaker in situ, [employer],  dual chamber, 10/14/2021    Cognitive impairment    Frail elderly    Dehydration, moderate    Hyphema, right eye    Primary open angle glaucoma (POAG) of both eyes, indeterminate stage    Pseudophakia of both eyes    Chronic fatigue    Anorexia nervosa    Benzodiazepine dependence, Ativan 0.5 mg daily since 2021    Panic attacks, onset 6/2023    NILSA on CPAP, using 53% to 70%    Dysphagia    Heart failure with preserved ejection fraction    Dyspnea    Nodular thyroid disease     Total time spend including review of record prior to face-to-face visit is 40 minutes. Greater than 50% of the time was spent in counseling and coordination of care. The above assessment and plan have been discussed at length. Referring provider's note reviewed. Labs and procedure over the last 6 months reviewed. Problem List updated. Asked to bring in all active medications / pills bottles with next visit. Will send note to referring / PCP.

## 2024-07-03 ENCOUNTER — CLINICAL SUPPORT (OUTPATIENT)
Dept: CARDIOLOGY | Facility: HOSPITAL | Age: 79
End: 2024-07-03
Payer: MEDICARE

## 2024-07-03 ENCOUNTER — CLINICAL SUPPORT (OUTPATIENT)
Dept: CARDIOLOGY | Facility: HOSPITAL | Age: 79
End: 2024-07-03
Attending: INTERNAL MEDICINE
Payer: MEDICARE

## 2024-07-03 DIAGNOSIS — I48.91 UNSPECIFIED ATRIAL FIBRILLATION: ICD-10-CM

## 2024-07-03 DIAGNOSIS — Z95.0 PRESENCE OF CARDIAC PACEMAKER: ICD-10-CM

## 2024-07-03 PROCEDURE — 93296 REM INTERROG EVL PM/IDS: CPT | Performed by: INTERNAL MEDICINE

## 2024-07-03 PROCEDURE — 93294 REM INTERROG EVL PM/LDLS PM: CPT | Mod: ,,, | Performed by: INTERNAL MEDICINE

## 2024-07-06 LAB
OHS CV AF BURDEN PERCENT: < 1
OHS CV DC REMOTE DEVICE TYPE: NORMAL
OHS CV ICD SHOCK: NO
OHS CV RV PACING PERCENT: 6.4 %

## 2024-07-09 ENCOUNTER — HOSPITAL ENCOUNTER (OUTPATIENT)
Dept: CARDIOLOGY | Facility: HOSPITAL | Age: 79
Discharge: HOME | End: 2024-07-09
Attending: INTERNAL MEDICINE
Payer: MEDICARE

## 2024-07-09 ENCOUNTER — PATIENT MESSAGE (OUTPATIENT)
Dept: PSYCHIATRY | Facility: CLINIC | Age: 79
End: 2024-07-09
Payer: MEDICARE

## 2024-07-09 ENCOUNTER — PATIENT MESSAGE (OUTPATIENT)
Dept: FAMILY MEDICINE | Facility: CLINIC | Age: 79
End: 2024-07-09
Payer: MEDICARE

## 2024-07-09 VITALS — HEIGHT: 67 IN | WEIGHT: 104 LBS | BODY MASS INDEX: 16.32 KG/M2

## 2024-07-09 DIAGNOSIS — R06.09 DOE (DYSPNEA ON EXERTION): ICD-10-CM

## 2024-07-09 DIAGNOSIS — R79.89 ELEVATED BRAIN NATRIURETIC PEPTIDE (BNP) LEVEL: ICD-10-CM

## 2024-07-09 LAB
AORTIC ROOT ANNULUS: 3 CM
AORTIC VALVE CUSP SEPERATION: 1.7 CM
AV INDEX (PROSTH): 0.95
AV MEAN GRADIENT: 2 MMHG
AV PEAK GRADIENT: 5 MMHG
AV REGURGITATION PRESSURE HALF TIME: 1079 MS
AV VALVE AREA BY VELOCITY RATIO: 3.34 CM²
AV VALVE AREA: 2.97 CM²
AV VELOCITY RATIO: 1.06
BSA FOR ECHO PROCEDURE: 1.49 M2
CV ECHO LV RWT: 0.59 CM
DOP CALC AO PEAK VEL: 1.11 M/S
DOP CALC AO VTI: 20 CM
DOP CALC LVOT AREA: 3.1 CM2
DOP CALC LVOT DIAMETER: 2 CM
DOP CALC LVOT PEAK VEL: 1.18 M/S
DOP CALC LVOT STROKE VOLUME: 59.35 CM3
DOP CALCLVOT PEAK VEL VTI: 18.9 CM
E WAVE DECELERATION TIME: 215 MSEC
E/A RATIO: 1
E/E' RATIO: 8 M/S
ECHO LV POSTERIOR WALL: 1 CM (ref 0.6–1.1)
FRACTIONAL SHORTENING: 35 % (ref 28–44)
INTERVENTRICULAR SEPTUM: 0.9 CM (ref 0.6–1.1)
IVRT: 116 MSEC
LEFT ATRIUM AREA SYSTOLIC (APICAL 2 CHAMBER): 14.3 CM2
LEFT ATRIUM AREA SYSTOLIC (APICAL 4 CHAMBER): 18.3 CM2
LEFT ATRIUM VOLUME INDEX MOD: 28 ML/M2
LEFT ATRIUM VOLUME MOD: 42.9 CM3
LEFT INTERNAL DIMENSION IN SYSTOLE: 2.2 CM (ref 2.1–4)
LEFT VENTRICLE DIASTOLIC VOLUME INDEX: 30.98 ML/M2
LEFT VENTRICLE DIASTOLIC VOLUME: 47.4 ML
LEFT VENTRICLE END SYSTOLIC VOLUME APICAL 2 CHAMBER: 34.2 ML
LEFT VENTRICLE END SYSTOLIC VOLUME APICAL 4 CHAMBER: 46.2 ML
LEFT VENTRICLE MASS INDEX: 60 G/M2
LEFT VENTRICLE SYSTOLIC VOLUME INDEX: 10.6 ML/M2
LEFT VENTRICLE SYSTOLIC VOLUME: 16.2 ML
LEFT VENTRICULAR INTERNAL DIMENSION IN DIASTOLE: 3.4 CM (ref 3.5–6)
LEFT VENTRICULAR MASS: 91.76 G
LV LATERAL E/E' RATIO: 8 M/S
LV SEPTAL E/E' RATIO: 8 M/S
LVED V (TEICH): 47.4 ML
LVES V (TEICH): 16.2 ML
LVOT MG: 3 MMHG
LVOT MV: 0.72 CM/S
MV PEAK A VEL: 0.4 M/S
MV PEAK E VEL: 0.4 M/S
MV STENOSIS PRESSURE HALF TIME: 100 MS
MV VALVE AREA P 1/2 METHOD: 2.2 CM2
OHS CV RV/LV RATIO: 0.47 CM
PISA AR MAX VEL: 2.95 M/S
PISA TR MAX VEL: 3.37 M/S
RA PRESSURE ESTIMATED: 3 MMHG
RIGHT VENTRICULAR END-DIASTOLIC DIMENSION: 1.6 CM
RV TB RVSP: 6 MMHG
TDI LATERAL: 0.05 M/S
TDI SEPTAL: 0.05 M/S
TDI: 0.05 M/S
TR MAX PG: 45 MMHG
TV REST PULMONARY ARTERY PRESSURE: 48 MMHG
Z-SCORE OF LEFT VENTRICULAR DIMENSION IN END DIASTOLE: -2.72
Z-SCORE OF LEFT VENTRICULAR DIMENSION IN END SYSTOLE: -1.82

## 2024-07-09 PROCEDURE — 93306 TTE W/DOPPLER COMPLETE: CPT | Mod: 26,,, | Performed by: GENERAL PRACTICE

## 2024-07-09 PROCEDURE — 93306 TTE W/DOPPLER COMPLETE: CPT

## 2024-07-11 ENCOUNTER — OFFICE VISIT (OUTPATIENT)
Dept: PSYCHIATRY | Facility: CLINIC | Age: 79
End: 2024-07-11
Payer: MEDICARE

## 2024-07-11 VITALS
WEIGHT: 110.56 LBS | BODY MASS INDEX: 17.35 KG/M2 | HEIGHT: 67 IN | DIASTOLIC BLOOD PRESSURE: 71 MMHG | HEART RATE: 80 BPM | SYSTOLIC BLOOD PRESSURE: 124 MMHG

## 2024-07-11 DIAGNOSIS — F33.1 MAJOR DEPRESSIVE DISORDER, RECURRENT, MODERATE: Primary | ICD-10-CM

## 2024-07-11 DIAGNOSIS — F41.1 GENERALIZED ANXIETY DISORDER: ICD-10-CM

## 2024-07-11 DIAGNOSIS — F41.0 PANIC DISORDER: ICD-10-CM

## 2024-07-11 DIAGNOSIS — F40.298 SPECIFIC PHOBIA: ICD-10-CM

## 2024-07-11 PROCEDURE — 1157F ADVNC CARE PLAN IN RCRD: CPT | Mod: CPTII,S$GLB,, | Performed by: PSYCHOLOGIST

## 2024-07-11 PROCEDURE — 3078F DIAST BP <80 MM HG: CPT | Mod: CPTII,S$GLB,, | Performed by: PSYCHOLOGIST

## 2024-07-11 PROCEDURE — 99999 PR PBB SHADOW E&M-EST. PATIENT-LVL III: CPT | Mod: PBBFAC,,, | Performed by: PSYCHOLOGIST

## 2024-07-11 PROCEDURE — 1101F PT FALLS ASSESS-DOCD LE1/YR: CPT | Mod: CPTII,S$GLB,, | Performed by: PSYCHOLOGIST

## 2024-07-11 PROCEDURE — 1125F AMNT PAIN NOTED PAIN PRSNT: CPT | Mod: CPTII,S$GLB,, | Performed by: PSYCHOLOGIST

## 2024-07-11 PROCEDURE — 90836 PSYTX W PT W E/M 45 MIN: CPT | Mod: S$GLB,,, | Performed by: PSYCHOLOGIST

## 2024-07-11 PROCEDURE — 3288F FALL RISK ASSESSMENT DOCD: CPT | Mod: CPTII,S$GLB,, | Performed by: PSYCHOLOGIST

## 2024-07-11 PROCEDURE — 3074F SYST BP LT 130 MM HG: CPT | Mod: CPTII,S$GLB,, | Performed by: PSYCHOLOGIST

## 2024-07-11 PROCEDURE — 99214 OFFICE O/P EST MOD 30 MIN: CPT | Mod: S$GLB,,, | Performed by: PSYCHOLOGIST

## 2024-07-11 RX ORDER — VORTIOXETINE 20 MG/1
20 TABLET, FILM COATED ORAL DAILY
Qty: 90 TABLET | Refills: 0 | Status: SHIPPED | OUTPATIENT
Start: 2024-07-11

## 2024-07-11 RX ORDER — BUSPIRONE HYDROCHLORIDE 10 MG/1
10 TABLET ORAL 3 TIMES DAILY
Qty: 270 TABLET | Refills: 0 | Status: SHIPPED | OUTPATIENT
Start: 2024-07-11

## 2024-07-11 RX ORDER — MIRTAZAPINE 7.5 MG/1
7.5 TABLET, FILM COATED ORAL NIGHTLY
Qty: 90 TABLET | Refills: 0 | Status: SHIPPED | OUTPATIENT
Start: 2024-07-11 | End: 2025-07-11

## 2024-07-11 NOTE — PROGRESS NOTES
Outpatient Psychiatry Follow-Up Visit    Clinical Status of Patient: Outpatient (Ambulatory)  07/11/2024     Chief Complaint: 78 y/o female presenting today with  for a follow-up.       Interval History and Content of Current Session:  Interim Events/Subjective Report/Content of Current Session: 78 y/o female follow-up appointment.    Pt is a 78 y/o female with past psychiatric hx of depression, anxiety, eating disorder who presents for follow-up treatment. Pt reported that she is doing well. Stated that she is having some rt leg pain. Has an appointment with PCP to discuss. Pt noted that she had an anxiety filled dream last night and took ativan in the morning. Stated that she has been reducing frequency ativan use and is taking maybe 1 to 2x per week. Pt noted that she is also having more energy and completing more tasks around the house.     Past Psychiatric hx: remeron, rexulti, vraylar, Limbitrol, venlafaxine, fluoxetine, Abilify, gabapentin, amitriptyline, buspirone, cannot remember others.     Past Medical hx:   Past Medical History:   Diagnosis Date    Anticoagulant long-term use     Anxiety     Arthritis     Atrial fibrillation     Basal cell carcinoma     Cancer     skin    CHF (congestive heart failure)     Depression     DVT (deep venous thrombosis)     GERD (gastroesophageal reflux disease)     Glaucoma (increased eye pressure)     Hyperlipidemia     diet controlled    Hypertension     Interstitial lung disease     Localized hives 01/10/2020    Mild persistent asthma without complication 11/12/2018    Pacemaker     Pneumonia 01/31/2014    Stroke 06/03/2014    Stroke     TIA (transient ischemic attack)     TIA (transient ischemic attack)         Interim hx:  Medication changes last visit: none  Anxiety: decrease  Depression: decrease     Denies suicidal/homicidal ideations.  Denies hopelessness/worthlessness.    Denies auditory/visual hallucinations      Alcohol: Infrequent use  Drug: Pt  denied  Caffeine: Not assessed  Tobacco: Pt denied      Review of Systems   PSYCHIATRIC: Pertinent items are noted in the narrative.        CONSTITUTIONAL: weight stable    Past Medical, Family and Social History: The patient's past medical, family and social history have been reviewed and updated as appropriate within the electronic medical record. See encounter notes.     Current Psychiatric Medication:  ativan 0.5 mg PRN, buspirone 10 mg TID, trintillex 20 mg, mirtazapine 7.5 mg      Compliance: yes      Side effects: Pt denied     Risk Parameters:  Patient reports no suicidal ideation  Patient reports no homicidal ideation  Patient reports no self-injurious behavior  Patient reports no violent behavior     Exam (detailed: at least 9 elements; comprehensive: all 15 elements)   Constitutional  Vitals:  Most recent vital signs, dated less than 90 days prior to this appointment, were reviewed. BP: ()/()   Arterial Line BP: ()/()       General:  unremarkable, age appropriate, casual attire, good eye contact, good rapport       Musculoskeletal  Muscle Strength/Tone:  no flaccidity, no tremor    Gait & Station:  normal      Psychiatric                       Speech:  normal tone, normal rate, rhythm, and volume   Mood & Affect:   Mildly anxious         Thought Process:   Goal directed; Linear    Associations:   intact   Thought Content:   No SI/HI, delusions, or paranoia, no AV/VH   Insight & Judgement:   Good, adequate to circumstances   Orientation:   grossly intact; alert and oriented x 4    Memory:  intact for content of interview    Language:  grossly intact, can repeat    Attention Span  : Grossly intact for content of interview   Fund of Knowledge:   intact and appropriate to age and level of education        Assessment and Diagnosis   Status/Progress: Based on the examination today, the patient's problem(s) is/are adequately controlled.  New problems have not been presented today. Co-morbidities are not  complicating management of the primary condition. There are no active rule-out diagnoses for this patient at this time.      Impression: Pt continues to respond well to medication plan. Pt reports having a cold recently that led to decreased appetite and decreased intake, leading to recent weight loss. Overall, however, she is eating well and fears of swallowing/choking also appear to have decreased. Will continue with medication plan and monitor symptoms moving forward.     Diagnosis:   1) Major Depressive Disorder, recurrent, moderate  2) Generalized Anxiety Disorder  3) Specific Phobia  4) Panic Disorder   5) Personality Disorder traits  6) R/o Dementia of unknown etiology  Intervention/Counseling/Treatment Plan   Medication Management:      1. ativan 0.5 mg  PRN    2. buspirone to 10 mg TID    3. trintillex 20 mg    4. mirtazapine 7.5 mg     5. Call to report any worsening of symptoms or problems with the medication. Pt instructed to go to ER with thoughts of harming self, others     6. Cont in therapy as needed    Psychotherapy: 45 minutes  Target symptoms: anxiety  Why chosen therapy is appropriate versus another modality: CBT used; relevant to diagnosis, patient responds to this modality  Outcome monitoring methods: self-report, observation  Therapeutic intervention type: Cognitive Behavioral Therapy, Coping  Topics discussed/themes: building skills sets for symptom management, symptom recognition, nutrition, exercise  The patient's response to the intervention is accepting  Patient's response to treatment is: good.   The patient's progress toward treatment goals: limited to fair     Return to clinic: 3 months    -Cognitive-Behavioral/Supportive therapy and psychoeducation provided  -R/B/SE's of medications discussed with the pt who expresses understanding and chooses to take medications as prescribed.   -Pt instructed to call clinic, 911 or go to nearest emergency room if sxs worsen or pt is in   crisis. The  pt expresses understanding.    Galo Lipscomb, PhD, MP     Antidepressant/Antianxiety Medication Initiation:  Patient informed of risks, benefits, and potential side effects of medication and accepts informed consent.  Common side effects include nausea, fatigue, headache, insomnia., Specifically discussed the possibility of new or worsening suicidal thoughts/depression.  Patient instructed to stop the medication immediately and seek urgent treatment if this occurs. Patient instructed not to abruptly discontinue medication without physician guidance except in cases of sudden onset or worsening of SI.       Stimulant Medication Initiation:  Patient advised of risks, benefits, and side effects of medication and accepts informed consent.  Common side effects include insomnia, irritability, jittery feeling, dry mouth, and agitation/hostility., Patient advised of potential addictive nature of medication and controlled substance classification.  Instructed to safeguard medication as no early refills can be given for lost or stolen medications.       Benzodiazepine Initiation:  Patient advised of the risks, benefits, and common side effects of medication and has accepted informed consent.  Common side effects include drowsiness, impaired coordination, possible memory loss., Patient advised NOT to operate a vehicle or machinery untiil they are sure how the medication will affec them.  Client also advised of danger of mixing this medication with alcohol., Patient advised of potential addictive nature of medication and need to safeguard medication as no early refills for lost or stolen medications can be authorized.

## 2024-07-12 ENCOUNTER — HOSPITAL ENCOUNTER (OUTPATIENT)
Dept: RADIOLOGY | Facility: CLINIC | Age: 79
Discharge: HOME | End: 2024-07-12
Attending: FAMILY MEDICINE
Payer: MEDICARE

## 2024-07-12 ENCOUNTER — OFFICE VISIT (OUTPATIENT)
Dept: FAMILY MEDICINE | Facility: CLINIC | Age: 79
End: 2024-07-12
Payer: MEDICARE

## 2024-07-12 VITALS
TEMPERATURE: 98 F | WEIGHT: 110 LBS | HEIGHT: 67 IN | DIASTOLIC BLOOD PRESSURE: 66 MMHG | HEART RATE: 83 BPM | SYSTOLIC BLOOD PRESSURE: 118 MMHG | OXYGEN SATURATION: 97 % | BODY MASS INDEX: 17.27 KG/M2

## 2024-07-12 DIAGNOSIS — H66.93 BILATERAL OTITIS MEDIA, UNSPECIFIED OTITIS MEDIA TYPE: ICD-10-CM

## 2024-07-12 DIAGNOSIS — I10 PRIMARY HYPERTENSION: ICD-10-CM

## 2024-07-12 DIAGNOSIS — M79.604 RIGHT LEG PAIN: Primary | ICD-10-CM

## 2024-07-12 DIAGNOSIS — M79.604 RIGHT LEG PAIN: ICD-10-CM

## 2024-07-12 PROCEDURE — 73502 X-RAY EXAM HIP UNI 2-3 VIEWS: CPT | Mod: TC,FY,PO,RT

## 2024-07-12 PROCEDURE — 73610 X-RAY EXAM OF ANKLE: CPT | Mod: 26,RT,S$GLB, | Performed by: RADIOLOGY

## 2024-07-12 PROCEDURE — 73502 X-RAY EXAM HIP UNI 2-3 VIEWS: CPT | Mod: 26,RT,S$GLB, | Performed by: RADIOLOGY

## 2024-07-12 PROCEDURE — 73610 X-RAY EXAM OF ANKLE: CPT | Mod: TC,FY,PO,RT

## 2024-07-12 PROCEDURE — 99999 PR PBB SHADOW E&M-EST. PATIENT-LVL V: CPT | Mod: PBBFAC,,, | Performed by: FAMILY MEDICINE

## 2024-07-12 RX ORDER — CYCLOBENZAPRINE HCL 5 MG
5 TABLET ORAL 3 TIMES DAILY PRN
Qty: 30 TABLET | Refills: 0 | Status: SHIPPED | OUTPATIENT
Start: 2024-07-12 | End: 2024-07-22

## 2024-07-12 RX ORDER — AMOXICILLIN AND CLAVULANATE POTASSIUM 400; 57 MG/5ML; MG/5ML
800 POWDER, FOR SUSPENSION ORAL EVERY 12 HOURS
Qty: 140 ML | Refills: 0 | Status: SHIPPED | OUTPATIENT
Start: 2024-07-12 | End: 2024-07-19

## 2024-07-12 NOTE — PROGRESS NOTES
"Subjective:       Patient ID: Ayla Dela Cruz is a 79 y.o. female.    Chief Complaint: right hip and foot pain (Right hip and foot pain)    Patient presents to clinic today for right hip and ankle pain. Patient states she woke up one day with this pain that radiates down to her ankle. Patient states she has tried icy hot and muscle rubs with no improvement.   Patient states she has had a "head cold" recently and requests to be checked for this.   Patient educated on plan of care, verbalized understanding.         Review of Systems   Constitutional:  Negative for activity change, appetite change, chills, diaphoresis and fever.   HENT:  Positive for ear pain, postnasal drip and sinus pressure. Negative for congestion, sneezing and sore throat.    Eyes:  Negative for pain, discharge, redness and itching.   Respiratory:  Negative for apnea, cough, chest tightness, shortness of breath and wheezing.    Cardiovascular:  Negative for chest pain and leg swelling.   Gastrointestinal:  Negative for abdominal distention, abdominal pain, constipation, diarrhea, nausea and vomiting.   Genitourinary:  Negative for difficulty urinating, dysuria, flank pain and frequency.   Skin:  Negative for color change, rash and wound.   Neurological:  Negative for dizziness.       Patient Active Problem List   Diagnosis    Paroxysmal atrial fibrillation, ablations X 3 1995, 1997, 9/2017, CHADS-VAS score 5, HAS-BLED score 5     Hypertension, 1985    Hyperlipidemia, baseline     GERD (gastroesophageal reflux disease)    Glaucoma (increased eye pressure)    Fibroid uterus    Thickened endometrium    Abnormal CT scan, chest    Interstitial lung disease    Pulmonary hypertension, without RV dysfunction    H/O: CVA (cerebrovascular accident), June 2014    LVH (left ventricular hypertrophy) due to hypertensive disease    Sedentary lifestyle, 6/2014    AVILA (dyspnea on exertion)    OA (osteoarthritis)    Chronic diastolic heart failure, " 2014    Elevated brain natriuretic peptide (BNP) level, range 98 - 1045    Microalbuminuria    Hypoalbuminemia due to protein-calorie malnutrition    S/P ablation of atrial flutter    JOSE (generalized anxiety disorder)    Other spondylosis, lumbar region    Cervical spondylosis    Medication intolerance    Anticoagulant long-term use    GERD (gastroesophageal reflux disease)    Mild intermittent asthma without complication    Elevated troponin    BMI less than 19,adult    Localized hives    Elevated LFTs    Iron deficiency anemia due to chronic blood loss    Recurrent major depressive disorder    Gastroenteritis    Other spondylosis, cervical region    History of DVT (deep vein thrombosis)    Chronic sinus complaints    Second degree AV block, Mobitz type I    Anemia    Syncope    Protein calorie malnutrition    At risk for cardiovascular event    Atrial fibrillation    Long term (current) use of anticoagulants    Atrial arrhythmia    Cardiac pacemaker in situ, [employer], dual chamber, 10/14/2021    Cognitive impairment    Frail elderly    Dehydration, moderate    Hyphema, right eye    Primary open angle glaucoma (POAG) of both eyes, indeterminate stage    Pseudophakia of both eyes    Chronic fatigue    Anorexia nervosa    Benzodiazepine dependence, Ativan 0.5 mg daily since 2021    Panic attacks, onset 6/2023    NILSA on CPAP, using 53% to 70%    Dysphagia    Heart failure with preserved ejection fraction    Dyspnea    Nodular thyroid disease       Objective:      Physical Exam  Vitals reviewed.   Constitutional:       General: She is not in acute distress.     Appearance: Normal appearance. She is well-developed.   HENT:      Head: Normocephalic.      Right Ear: A middle ear effusion is present. Tympanic membrane is injected and bulging.      Left Ear: A middle ear effusion is present. Tympanic membrane is injected and bulging.      Nose:      Right Sinus: Maxillary sinus tenderness present.      Left Sinus:  "Maxillary sinus tenderness present.   Eyes:      Conjunctiva/sclera: Conjunctivae normal.      Pupils: Pupils are equal, round, and reactive to light.   Cardiovascular:      Rate and Rhythm: Normal rate and regular rhythm.      Heart sounds: Normal heart sounds.   Pulmonary:      Effort: Pulmonary effort is normal. No respiratory distress.      Breath sounds: Normal breath sounds.   Musculoskeletal:      Cervical back: Normal range of motion and neck supple.   Skin:     General: Skin is warm and dry.      Findings: No rash.   Neurological:      Mental Status: She is alert and oriented to person, place, and time.   Psychiatric:         Behavior: Behavior normal.         Lab Results   Component Value Date    WBC 8.84 10/29/2023    HGB 14.3 10/29/2023    HCT 42.1 10/29/2023     10/29/2023    CHOL 218 (H) 10/25/2023    TRIG 75 10/25/2023    HDL 74 10/25/2023    ALT 16 10/29/2023    AST 23 10/29/2023     06/28/2024    K 3.8 06/28/2024     06/28/2024    CREATININE 0.9 06/28/2024    BUN 19 06/28/2024    CO2 26 06/28/2024    TSH 1.078 10/25/2023    INR 1.1 10/26/2023    GLUF 102 07/25/2008    HGBA1C 5.6 10/05/2023     The ASCVD Risk score (Anna MERCER, et al., 2019) failed to calculate for the following reasons:    The 2019 ASCVD risk score is only valid for ages 40 to 79    The patient has a prior MI or stroke diagnosis  Visit Vitals  /66 (BP Location: Right arm, Patient Position: Sitting, BP Method: Small (Manual))   Pulse 83   Temp 97.5 °F (36.4 °C) (Oral)   Ht 5' 7" (1.702 m)   Wt 49.9 kg (110 lb 0.2 oz)   SpO2 97%   BMI 17.23 kg/m²      Assessment:       1. Right leg pain    2. BMI less than 19,adult    3. Primary hypertension    4. Bilateral otitis media, unspecified otitis media type        Plan:       Ayla was seen today for right hip and foot pain.    Diagnoses and all orders for this visit:    Right leg pain  -     X-Ray Hip 2 or 3 views Right with Pelvis when performed; Future  -     " X-Ray Ankle Complete Right; Future  -     cyclobenzaprine (FLEXERIL) 5 MG tablet; Take 1 tablet (5 mg total) by mouth 3 (three) times daily as needed for Muscle spasms.  Imaging today  Discussed seeing pain management if nothing on xray  Continue medications as prescribed.  Follow up with PCP     BMI less than 19,adult  Body mass index is 17.23 kg/m².  Continue healthy diet and regular exercise as tolerated.  Continue medications as prescribed.  Follow up with PCP    Primary hypertension  Stable  Continue medications as prescribed.  Follow up with PCP    Bilateral otitis media, unspecified otitis media type  -     amoxicillin-clavulanate (AUGMENTIN) 400-57 mg/5 mL SusR; Take 10 mLs (800 mg total) by mouth every 12 (twelve) hours. for 7 days  Continue medications as prescribed.  Follow up with PCP       Follow up if symptoms worsen or fail to improve.      Future Appointments       Date Provider Specialty Appt Notes    7/12/2024  Radiology right    7/12/2024  Radiology right    8/21/2024 Jan Olivo MD Family Medicine follow    9/20/2024 Allie Jarrell MD Dermatology 6 mth skin check    10/17/2024 Galo Lipscomb, PhD, MP Psychiatry F/u    11/4/2024  Ophthalmology 6 mo HVF and IOP    11/4/2024 Daniel Tan MD Ophthalmology 6 mo HVF and IOP    1/6/2025 Barrett Jurado MD Cardiology 6 month             All of your core healthy metrics are met.

## 2024-07-23 ENCOUNTER — PATIENT MESSAGE (OUTPATIENT)
Dept: FAMILY MEDICINE | Facility: CLINIC | Age: 79
End: 2024-07-23
Payer: MEDICARE

## 2024-07-29 ENCOUNTER — PATIENT MESSAGE (OUTPATIENT)
Dept: PAIN MEDICINE | Facility: CLINIC | Age: 79
End: 2024-07-29
Payer: MEDICARE

## 2024-08-21 ENCOUNTER — OFFICE VISIT (OUTPATIENT)
Dept: FAMILY MEDICINE | Facility: CLINIC | Age: 79
End: 2024-08-21
Attending: FAMILY MEDICINE
Payer: MEDICARE

## 2024-08-21 VITALS
TEMPERATURE: 98 F | HEART RATE: 78 BPM | BODY MASS INDEX: 17.28 KG/M2 | DIASTOLIC BLOOD PRESSURE: 64 MMHG | WEIGHT: 110.13 LBS | SYSTOLIC BLOOD PRESSURE: 116 MMHG | OXYGEN SATURATION: 97 % | HEIGHT: 67 IN

## 2024-08-21 DIAGNOSIS — Z95.0 CARDIAC PACEMAKER IN SITU: ICD-10-CM

## 2024-08-21 DIAGNOSIS — I10 PRIMARY HYPERTENSION: ICD-10-CM

## 2024-08-21 DIAGNOSIS — I48.19 PERSISTENT ATRIAL FIBRILLATION: ICD-10-CM

## 2024-08-21 DIAGNOSIS — R68.2 DRY MOUTH NOT DUE TO SICCA SYNDROME: Primary | ICD-10-CM

## 2024-08-21 PROCEDURE — 99999 PR PBB SHADOW E&M-EST. PATIENT-LVL V: CPT | Mod: PBBFAC,,, | Performed by: FAMILY MEDICINE

## 2024-08-21 NOTE — PROGRESS NOTES
Subjective:       Patient ID: Ayla Dela Cruz is a 79 y.o. female.    Chief Complaint: Follow-up (6 month)    79-year-old female with a number of chronic medical problems including atrial fibrillation status post pacemaker implantation, hypertension, hyperlipidemia, pulmonary hypertension, diastolic heart failure, mild intermittent asthma, generalized anxiety disorder and major depression with dementia and numerous other problems.  She is coming in mainly for a blood pressure check but is having problems with dry eyes and dry mouth.  She was seen in the ENT clinic not long ago where she was tested for Sjogren syndrome with negative SSA and SSB antibodies and negative LAWRENCE and normal thyroid.  She was told he use Biotene which she has tried but she does not like it.  She does drink water frequently and keeps water by her bedside at night.  She has been having problems with a rash over the upper arms bilaterally but not the forearms or the face.  She does scratch at them somewhat but this does not appear to be SPD related with the limited coverage to just the upper arms and a very few spots.  She has been seeing Dr. Jarrell also who has made suggestions for her dry skin.  She has some chronic rhinitis and uses Flonase PRN with the occasional use of Afrin when she is very congested.  She has been using some Claritin which may be contributing to her dryness.  I discussed it with her  and suggested they might want to try using the Astelin spray which will limit the effect to just nasal passages and probably oral mucosa but should be less drying than the systemic antihistamines.  He will try discontinuing the Claritin and see if she improves with that.  She uses nasal saline also.    Past Medical History:  No date: Anticoagulant long-term use  No date: Anxiety  No date: Arthritis  No date: Atrial fibrillation  No date: Basal cell carcinoma  No date: Cancer      Comment:  skin  No date: CHF (congestive heart  failure)  No date: Depression  No date: DVT (deep venous thrombosis)  No date: GERD (gastroesophageal reflux disease)  No date: Glaucoma (increased eye pressure)  No date: Hyperlipidemia      Comment:  diet controlled  No date: Hypertension  No date: Interstitial lung disease  01/10/2020: Localized hives  11/12/2018: Mild persistent asthma without complication  No date: Pacemaker  01/31/2014: Pneumonia  06/03/2014: Stroke  No date: Stroke  No date: TIA (transient ischemic attack)  No date: TIA (transient ischemic attack)    Past Surgical History:  No date: 2 heart ablations      Comment:  3 in total  10/14/2021: A-V CARDIAC PACEMAKER INSERTION; Left      Comment:  Procedure: INSERTION, CARDIAC PACEMAKER, DUAL CHAMBER;                 Surgeon: Fco Calderon MD;  Location: [facility];                 Service: Cardiology;  Laterality: Left;  PAF/ [place]                Arrthymias, Dual PPM, SJM, MAC, GP, 3 PREP  7/27/2022: ANTERIOR VITRECTOMY; Right      Comment:  Procedure: VITRECTOMY, ANTERIOR APPROACH;  Surgeon:                Daniel Tan MD;  Location: [facility] beds at the [facility] OR;  Service:                Ophthalmology;  Laterality: Right;  No date: bilateral cataracts  No date: CHOLECYSTECTOMY  1/19/2017: COLONOSCOPY; N/A      Comment:  Procedure: COLONOSCOPY and EGD;  Surgeon: Jonathan Schultz MD;  Location: [facility];  Service: Endoscopy;                 Laterality: N/A;  10/10/2019: COLONOSCOPY; N/A      Comment:  Procedure: COLONOSCOPY;  Surgeon: Alex Christian MD;                Location: [facility];  Service: Endoscopy;  Laterality:                N/A;  10/14/2019: ESOPHAGOGASTRODUODENOSCOPY; N/A      Comment:  Procedure: EGD (ESOPHAGOGASTRODUODENOSCOPY);  Surgeon:                Alex Christian MD;  Location: [facility];  Service:                Endoscopy;  Laterality: N/A;  1/25/2024: ESOPHAGOGASTRODUODENOSCOPY; N/A      Comment:  Procedure: EGD (ESOPHAGOGASTRODUODENOSCOPY);  Surgeon:                 Alex Christian MD;  [facility];  Service:                Endoscopy;  Laterality: N/A;  6/7/2018: INJECTION OF ANESTHETIC AGENT AROUND MEDIAL BRANCH NERVES   INNERVATING CERVICAL FACET JOINT; Right      Comment:  Procedure: BLOCK, NERVE, FACET JOINT, MEDIAL BRANCH,                CERVICAL;  Surgeon: Galo Clancy MD;  Location: [facility];                 Service: Pain Management;  Laterality: Right;  C4, 5, 6  11/1/2018: OPEN REDUCTION AND INTERNAL FIXATION (ORIF) OF INJURY OF   ANKLE; Right      Comment:  Procedure: ORIF, ANKLE;  Surgeon: Wilfredo Aguero MD;                Location: [facility];  Service: Orthopedics;  Laterality:                Right;  7/27/2022: PARACENTESIS, EYE, ANTERIOR CHAMBER, WITH AQUEOUS REMOVAL;   Right      Comment:  Procedure: Anterior chamber washout, possible IOL                removal;  Surgeon: Daniel Tan MD;  Location: [facility];  Service: Ophthalmology;  Laterality: Right;  No date: pyloristenosis  9/21/2018: RADIOFREQUENCY ABLATION OF LUMBAR MEDIAL BRANCH NERVE AT   SINGLE LEVEL; Bilateral      Comment:  Procedure: RADIOFREQUENCY ABLATION, NERVE, SPINAL,                LUMBAR, MEDIAL BRANCH, 1 LEVEL;  Surgeon: Galo Clancy MD;               Location: [facility];  Service: Pain Management;                 Laterality: Bilateral;  L3, 4, 5  2/19/2019: RADIOFREQUENCY ABLATION OF LUMBAR MEDIAL BRANCH NERVE AT   SINGLE LEVEL; Bilateral      Comment:  Procedure: Radiofrequency Ablation, Nerve, Spinal,                Lumbar, Medial Branch, 1 Level;  Surgeon: Galo Clancy MD;               Location: [facility];  Service: Pain Management;                 Laterality: Bilateral;  L3, 4, 5   3/10/2020: RADIOFREQUENCY ABLATION OF LUMBAR MEDIAL BRANCH NERVE AT   SINGLE LEVEL; Bilateral      Comment:  Procedure: Radiofrequency Ablation, Nerve, Spinal,                Lumbar, Medial Branch, 1 Level;  Surgeon: Galo Clancy MD;               Location: [facility] OR;  Service: Pain  Management;                 Laterality: Bilateral;  L3,4,5 - Burned at 80 degrees C.                for 60 seconds x 2 each site    9/11/2020: RADIOFREQUENCY ABLATION OF LUMBAR MEDIAL BRANCH NERVE AT   SINGLE LEVEL; Bilateral      Comment:  Procedure: Radiofrequency Ablation, Nerve, Spinal,                Lumbar, Medial Branch, 1 Level;  Surgeon: Galo Clancy MD;               Location: [facility] OR;  Service: Pain Management;                 Laterality: Bilateral;  L3, 4, 5 - Burned at 80 degrees                C. for 60 seconds x 2 each site  5/28/2021: RADIOFREQUENCY ABLATION OF LUMBAR MEDIAL BRANCH NERVE AT   SINGLE LEVEL; Bilateral      Comment:  Procedure: Radiofrequency Ablation, Nerve, Spinal,                Lumbar, Medial Branch, 1 Level;  Surgeon: Galo Clancy MD;               Location: [facility] OR;  Service: Pain Management;                 Laterality: Bilateral;  L3,4,5  7/3/2018: RADIOFREQUENCY THERMAL COAGULATION OF MEDIAL BRANCH OF   POSTERIOR RAMUS OF CERVICAL SPINAL NERVE; Right      Comment:  Procedure: RADIOFREQUENCY THERMAL COAGULATION, NERVE,                SPINAL, CERVICAL, MEDIAL BRANCH OF POSTERIOR RAMUS;                 Surgeon: Galo Clancy MD;  Location: [facility] OR;  Service:                Pain Management;  Laterality: Right;  C4,5,6 - Burned at                80 degrees C. for 75 seconds each site  7/23/2019: RADIOFREQUENCY THERMAL COAGULATION OF MEDIAL BRANCH OF   POSTERIOR RAMUS OF CERVICAL SPINAL NERVE; Right      Comment:  Procedure: RADIOFREQUENCY THERMAL COAGULATION, NERVE,                SPINAL, CERVICAL, POSTERIOR RAMUS, MEDIAL BRANCH;                 Surgeon: Galo Clancy MD;  Location: [facility] OR;  Service:                Pain Management;  Laterality: Right;  C4,5,6  6/23/2020: RADIOFREQUENCY THERMAL COAGULATION OF MEDIAL BRANCH OF   POSTERIOR RAMUS OF CERVICAL SPINAL NERVE; Right      Comment:  Procedure: RADIOFREQUENCY THERMAL COAGULATION, NERVE,                SPINAL, CERVICAL, POSTERIOR  RAMUS, MEDIAL BRANCH;                 Surgeon: Galo Clancy MD;  Location: [facility] OR;  Service:                Pain Management;  Laterality: Right;  C4, 5, 6  9/10/2019: RADIOFREQUENCY THERMOCOAGULATION; Bilateral      Comment:  Procedure: RADIOFREQUENCY THERMAL COAGULATION LUMBAR;                 Surgeon: Galo Clancy MD;  Location: [facility] OR;  Service:                Pain Management;  Laterality: Bilateral;  L3,4,5 - Burned               at 80 degrees C. for 60 seconds x 2 each site  No date: skin cancer removal   No date: TONSILLECTOMY    Current Outpatient Medications on File Prior to Visit:  apixaban (ELIQUIS) 2.5 mg Tab, Take 1 tablet (2.5 mg total) by mouth 2 (two) times daily., Disp: 180 tablet, Rfl: 3  aspirin 81 MG Chew, Take 81 mg by mouth once daily., Disp: , Rfl:   atropine 1% (ISOPTO ATROPINE) 1 % Drop, PLACE 1 DROP INTO THE RIGHT EYE 2 TIMES A DAY., Disp: 5 mL, Rfl: 6  azelastine (ASTELIN) 137 mcg (0.1 %) nasal spray, 1 spray (137 mcg total) by Nasal route 2 (two) times daily., Disp: 30 mL, Rfl: 5  busPIRone (BUSPAR) 10 MG tablet, Take 1 tablet (10 mg total) by mouth 3 (three) times daily., Disp: 270 tablet, Rfl: 0  furosemide (LASIX) 20 MG tablet, Take 1 tablet (20 mg total) by mouth daily as needed (Edema)., Disp: 10 tablet, Rfl: 3  latanoprost 0.005 % ophthalmic solution, Place 1 drop into both eyes once daily., Disp: 2.5 mL, Rfl: 11  LORazepam (ATIVAN) 0.5 MG tablet, Take 1 tablet (0.5 mg total) by mouth daily as needed for Anxiety., Disp: 30 tablet, Rfl: 0  mirtazapine (REMERON) 7.5 MG Tab, Take 1 tablet (7.5 mg total) by mouth every evening., Disp: 90 tablet, Rfl: 0  pantoprazole (PROTONIX) 40 MG tablet, TAKE 1 TABLET (40 MG TOTAL) BY MOUTH ONCE DAILY., Disp: 90 tablet, Rfl: 3  predniSONE (DELTASONE) 20 MG tablet, Take one daily for 3 days and may repeat for shortness of breath., Disp: 12 tablet, Rfl: 0  propranoloL (INDERAL) 40 MG tablet, Take 1 tablet (40 mg total) by mouth 2 (two) times daily.,  Disp: 60 tablet, Rfl: 11  triamcinolone acetonide 0.1% (KENALOG) 0.1 % cream, AAA back bid PRN rash, Disp: 80 g, Rfl: 3  triamterene-hydrochlorothiazide 75-50 mg (MAXZIDE) 75-50 mg per tablet, Take 1 tablet by mouth once daily., Disp: 30 tablet, Rfl: 11  vortioxetine (TRINTELLIX) 20 mg Tab, Take 1 tablet (20 mg total) by mouth Daily., Disp: 90 tablet, Rfl: 0  fluticasone-umeclidin-vilanter (TRELEGY ELLIPTA) 200-62.5-25 mcg inhaler, Inhale 1 puff into the lungs once daily. (Patient not taking: Reported on 7/12/2024), Disp: 60 each, Rfl: 11    Current Facility-Administered Medications on File Prior to Visit:  bupivacaine (PF) 0.25% (2.5 mg/ml) injection, , , PRN, Galo Clancy MD, 6 mL at 02/19/19 1314  lidocaine (PF) 10 mg/ml (1%) injection, , , PRN, Galo Clancy MD, 9 mL at 02/19/19 1304  lidocaine (PF) 20 mg/ml (2%) injection, , , PRN, Galo Clancy MD, 6 mL at 02/19/19 1310  methylPREDNISolone acetate injection, , , PRN, Galo Clancy MD, 80 mg at 02/19/19 1314            Review of Systems   HENT:  Positive for congestion. Negative for postnasal drip, rhinorrhea, sinus pressure and sinus pain.    Eyes:  Positive for itching.   Respiratory:  Negative for chest tightness and shortness of breath.    Cardiovascular:  Negative for chest pain and palpitations.   Skin:  Positive for rash (Excoriations of the upper arms bilaterally).       Objective:      Physical Exam  Vitals and nursing note reviewed.   Constitutional:       General: She is not in acute distress.     Appearance: Normal appearance. She is not ill-appearing, toxic-appearing or diaphoretic.      Comments: Good blood pressure control   Normal pulse with regular rhythm   Underweight with a BMI of 17.3 she is down 0.6 lb from her February 27, 2024 visit   HENT:      Head: Normocephalic and atraumatic.      Right Ear: Tympanic membrane, ear canal and external ear normal. There is no impacted cerumen (Relatively large amount of cerumen but not obstructing).       Left Ear: Tympanic membrane, ear canal and external ear normal. There is no impacted cerumen (Relatively large amount of cerumen but not obstructing).      Nose: Nose normal. No congestion ( states that she used Afrin this morning before coming to the office) or rhinorrhea.      Mouth/Throat:      Mouth: Mucous membranes are dry.      Pharynx: Oropharynx is clear. No oropharyngeal exudate or posterior oropharyngeal erythema.   Cardiovascular:      Rate and Rhythm: Normal rate and regular rhythm.      Heart sounds: Normal heart sounds.   Pulmonary:      Effort: Pulmonary effort is normal. No respiratory distress.      Breath sounds: Normal breath sounds. No stridor. No wheezing, rhonchi or rales.   Musculoskeletal:      Right lower leg: No edema.      Left lower leg: No edema.   Skin:     Findings: Rash (Excoriations of the upper arms limited to the shoulder to elbow range bilaterally) present.   Neurological:      Mental Status: She is alert and oriented to person, place, and time. Mental status is at baseline.   Psychiatric:         Mood and Affect: Mood normal.         Behavior: Behavior normal.         Assessment:       1. Dry mouth not due to sicca syndrome    2. Primary hypertension    3. Persistent atrial fibrillation    4. Cardiac pacemaker in situ, [employer], dual chamber, 10/14/2021        Plan:       1. Dry mouth not due to sicca syndrome  Suggested continue using the Biotene, adding a small amount of limit to her water may help with salivary secretion.  Continue to maintain good hydration    2. Primary hypertension  Well controlled with no changes needed in propranolol and Maxzide, she uses Lasix PRN.  She can not do completely without the diuretics due to building up fluid around the heart    3. Persistent atrial fibrillation  Controlled with pacemaker still on low-dose Eliquis 2.5 b.i.d.    4. Cardiac pacemaker in situ, [employer], dual chamber, 10/14/2021  Stable, monitored by  Cardiology

## 2024-08-28 NOTE — TELEPHONE ENCOUNTER
Call placed to patient for notification. Patient verbalized understanding. Follow up lab appointment scheduled for the date of 10-5-23. Patient agreed to appointment date, time, and location.     Additional Notes: Pt has bx on his right forehead in 2018 at [facility] in [facility]. Multiple attempts to obtain path report through pathology labs and previous office. 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 Detail Level: Simple Render Risk Assessment In Note?: no

## 2024-09-04 ENCOUNTER — PATIENT MESSAGE (OUTPATIENT)
Dept: FAMILY MEDICINE | Facility: CLINIC | Age: 79
End: 2024-09-04
Payer: MEDICARE

## 2024-09-04 DIAGNOSIS — R73.9 HYPERGLYCEMIA: Primary | ICD-10-CM

## 2024-09-05 ENCOUNTER — LAB VISIT (OUTPATIENT)
Dept: LAB | Facility: HOSPITAL | Age: 79
End: 2024-09-05
Attending: FAMILY MEDICINE
Payer: MEDICARE

## 2024-09-05 ENCOUNTER — OFFICE VISIT (OUTPATIENT)
Dept: DERMATOLOGY | Facility: CLINIC | Age: 79
End: 2024-09-05
Payer: MEDICARE

## 2024-09-05 VITALS — WEIGHT: 110.25 LBS | BODY MASS INDEX: 17.3 KG/M2 | HEIGHT: 67 IN

## 2024-09-05 DIAGNOSIS — D22.9 MULTIPLE BENIGN NEVI: ICD-10-CM

## 2024-09-05 DIAGNOSIS — L28.2 PRURIGO: ICD-10-CM

## 2024-09-05 DIAGNOSIS — Z85.828 ENCOUNTER FOR FOLLOW-UP SURVEILLANCE OF SKIN CANCER: ICD-10-CM

## 2024-09-05 DIAGNOSIS — Z08 ENCOUNTER FOR FOLLOW-UP SURVEILLANCE OF SKIN CANCER: ICD-10-CM

## 2024-09-05 DIAGNOSIS — D18.01 CHERRY ANGIOMA: ICD-10-CM

## 2024-09-05 DIAGNOSIS — L82.1 SEBORRHEIC KERATOSES: ICD-10-CM

## 2024-09-05 DIAGNOSIS — L57.0 ACTINIC KERATOSES: Primary | ICD-10-CM

## 2024-09-05 DIAGNOSIS — R73.9 HYPERGLYCEMIA: ICD-10-CM

## 2024-09-05 LAB
ESTIMATED AVG GLUCOSE: 123 MG/DL (ref 68–131)
HBA1C MFR BLD: 5.9 % (ref 4–5.6)

## 2024-09-05 PROCEDURE — 1101F PT FALLS ASSESS-DOCD LE1/YR: CPT | Mod: CPTII,S$GLB,, | Performed by: DERMATOLOGY

## 2024-09-05 PROCEDURE — 1157F ADVNC CARE PLAN IN RCRD: CPT | Mod: CPTII,S$GLB,, | Performed by: DERMATOLOGY

## 2024-09-05 PROCEDURE — 1159F MED LIST DOCD IN RCRD: CPT | Mod: CPTII,S$GLB,, | Performed by: DERMATOLOGY

## 2024-09-05 PROCEDURE — 3288F FALL RISK ASSESSMENT DOCD: CPT | Mod: CPTII,S$GLB,, | Performed by: DERMATOLOGY

## 2024-09-05 PROCEDURE — 17000 DESTRUCT PREMALG LESION: CPT | Mod: S$GLB,,, | Performed by: DERMATOLOGY

## 2024-09-05 PROCEDURE — 36415 COLL VENOUS BLD VENIPUNCTURE: CPT | Mod: PO | Performed by: FAMILY MEDICINE

## 2024-09-05 PROCEDURE — 1160F RVW MEDS BY RX/DR IN RCRD: CPT | Mod: CPTII,S$GLB,, | Performed by: DERMATOLOGY

## 2024-09-05 PROCEDURE — 99213 OFFICE O/P EST LOW 20 MIN: CPT | Mod: 25,S$GLB,, | Performed by: DERMATOLOGY

## 2024-09-05 PROCEDURE — 83036 HEMOGLOBIN GLYCOSYLATED A1C: CPT | Performed by: FAMILY MEDICINE

## 2024-09-05 NOTE — PROGRESS NOTES
Subjective:      Patient ID:  Ayla Dela Cruz is a 79 y.o. female who presents for   Chief Complaint   Patient presents with    Skin Check     TBSC      LOV 2/26/24 AK, SK, Dermatitis    Patient here today for TBSC  C/O dry spot to nose x a while. No symptoms.     **Pacemaker**    Derm Hx:  BCC left upper lip- 10/2022 , MOHs with  01/2023  BCC left upper back- 10/2022 , E&S  BCC right angle of mandible E&s 12/2023  Denies Fhx of MM     Current Outpatient Medications:   ·  apixaban (ELIQUIS) 2.5 mg Tab, Take 1 tablet (2.5 mg total) by mouth 2 (two) times daily., Disp: 180 tablet, Rfl: 3  ·  aspirin 81 MG Chew, Take 81 mg by mouth once daily., Disp: , Rfl:   ·  atropine 1% (ISOPTO ATROPINE) 1 % Drop, PLACE 1 DROP INTO THE RIGHT EYE 2 TIMES A DAY., Disp: 5 mL, Rfl: 6  ·  azelastine (ASTELIN) 137 mcg (0.1 %) nasal spray, 1 spray (137 mcg total) by Nasal route 2 (two) times daily., Disp: 30 mL, Rfl: 5  ·  busPIRone (BUSPAR) 10 MG tablet, Take 1 tablet (10 mg total) by mouth 3 (three) times daily., Disp: 270 tablet, Rfl: 0  ·  fluticasone-umeclidin-vilanter (TRELEGY ELLIPTA) 200-62.5-25 mcg inhaler, Inhale 1 puff into the lungs once daily., Disp: 60 each, Rfl: 11  ·  furosemide (LASIX) 20 MG tablet, Take 1 tablet (20 mg total) by mouth daily as needed (Edema)., Disp: 10 tablet, Rfl: 3  ·  latanoprost 0.005 % ophthalmic solution, Place 1 drop into both eyes once daily., Disp: 2.5 mL, Rfl: 11  ·  LORazepam (ATIVAN) 0.5 MG tablet, Take 1 tablet (0.5 mg total) by mouth daily as needed for Anxiety., Disp: 30 tablet, Rfl: 0  ·  mirtazapine (REMERON) 7.5 MG Tab, Take 1 tablet (7.5 mg total) by mouth every evening., Disp: 90 tablet, Rfl: 0  ·  pantoprazole (PROTONIX) 40 MG tablet, TAKE 1 TABLET (40 MG TOTAL) BY MOUTH ONCE DAILY., Disp: 90 tablet, Rfl: 3  ·  predniSONE (DELTASONE) 20 MG tablet, Take one daily for 3 days and may repeat for shortness of breath., Disp: 12 tablet, Rfl: 0  ·   propranoloL (INDERAL) 40 MG tablet, Take 1 tablet (40 mg total) by mouth 2 (two) times daily., Disp: 60 tablet, Rfl: 11  ·  triamcinolone acetonide 0.1% (KENALOG) 0.1 % cream, AAA back bid PRN rash, Disp: 80 g, Rfl: 3  ·  triamterene-hydrochlorothiazide 75-50 mg (MAXZIDE) 75-50 mg per tablet, Take 1 tablet by mouth once daily., Disp: 30 tablet, Rfl: 11  ·  vortioxetine (TRINTELLIX) 20 mg Tab, Take 1 tablet (20 mg total) by mouth Daily., Disp: 90 tablet, Rfl: 0        Review of Systems   Constitutional:  Negative for fever, chills and fatigue.   Skin:  Positive for daily sunscreen use, activity-related sunscreen use and wears hat.       Objective:   Physical Exam   Constitutional: She appears well-developed and well-nourished. No distress.   Neurological: She is alert and oriented to person, place, and time. She is not disoriented.   Psychiatric: She has a normal mood and affect.   Skin:   Areas Examined (abnormalities noted in diagram):   Scalp / Hair Palpated and Inspected  Head / Face Inspection Performed  Neck Inspection Performed  Chest / Axilla Inspection Performed  Abdomen Inspection Performed  Back Inspection Performed  RUE Inspected  LUE Inspection Performed  Nails and Digits Inspection Performed                     Diagram Legend     Erythematous scaling macule/papule c/w actinic keratosis       Vascular papule c/w angioma      Pigmented verrucoid papule/plaque c/w seborrheic keratosis      Yellow umbilicated papule c/w sebaceous hyperplasia      Irregularly shaped tan macule c/w lentigo     1-2 mm smooth white papules consistent with Milia      Movable subcutaneous cyst with punctum c/w epidermal inclusion cyst      Subcutaneous movable cyst c/w pilar cyst      Firm pink to brown papule c/w dermatofibroma      Pedunculated fleshy papule(s) c/w skin tag(s)      Evenly pigmented macule c/w junctional nevus     Mildly variegated pigmented, slightly irregular-bordered macule c/w mildly atypical nevus       Flesh colored to evenly pigmented papule c/w intradermal nevus       Pink pearly papule/plaque c/w basal cell carcinoma      Erythematous hyperkeratotic cursted plaque c/w SCC      Surgical scar with no sign of skin cancer recurrence      Open and closed comedones      Inflammatory papules and pustules      Verrucoid papule consistent consistent with wart     Erythematous eczematous patches and plaques     Dystrophic onycholytic nail with subungual debris c/w onychomycosis     Umbilicated papule    Erythematous-base heme-crusted tan verrucoid plaque consistent with inflamed seborrheic keratosis     Erythematous Silvery Scaling Plaque c/w Psoriasis     See annotation      Assessment / Plan:        Actinic keratoses  Cryosurgery Procedure Note    Verbal consent from the patient is obtained and the patient is aware of the precancerous quality and need for treatment of these lesions. Liquid nitrogen cryosurgery is applied to the 1 actinic keratoses, as detailed in the physical exam, to produce a freeze injury. The patient is aware that blisters may form and is instructed on wound care with gentle cleansing and use of vaseline ointment to keep moist until healed. The patient is supplied a handout on cryosurgery and is instructed to call if lesions do not completely resolve.    Encounter for follow-up surveillance of skin cancer  Area of previous NMSCs examined. Sites well healed with no signs of recurrence.    Total body skin examination performed today including at least 12 points as noted in physical examination. No lesions suspicious for malignancy noted.    Seborrheic keratoses  These are benign inherited growths without a malignant potential. Reassurance given to patient. No treatment is necessary.     Cherry angioma  This is a benign vascular lesion. Reassurance given. No treatment required.     Prurigo  Good insight  Denies itch    Patient instructed in importance in daily broad spectrum sun protection of at least  spf 30. Mineral sunscreen ingredients preferred (Zinc +/- Titanium) and can be found OTC.   Patient encouraged to wear hat for all outdoor exposure.   Also discussed sun avoidance and use of protective clothing.             Follow up in about 6 months (around 3/5/2025).

## 2024-09-05 NOTE — PATIENT INSTRUCTIONS

## 2024-09-09 ENCOUNTER — PATIENT MESSAGE (OUTPATIENT)
Dept: OBSTETRICS AND GYNECOLOGY | Facility: CLINIC | Age: 79
End: 2024-09-09
Payer: MEDICARE

## 2024-09-09 DIAGNOSIS — Z12.31 BREAST CANCER SCREENING BY MAMMOGRAM: Primary | ICD-10-CM

## 2024-09-09 LAB
OHS QRS DURATION: 110 MS
OHS QTC CALCULATION: 477 MS

## 2024-09-13 ENCOUNTER — PATIENT MESSAGE (OUTPATIENT)
Dept: OPHTHALMOLOGY | Facility: CLINIC | Age: 79
End: 2024-09-13
Payer: MEDICARE

## 2024-09-14 DIAGNOSIS — F41.0 PANIC ATTACKS: ICD-10-CM

## 2024-09-14 DIAGNOSIS — I48.0 PAROXYSMAL ATRIAL FIBRILLATION: ICD-10-CM

## 2024-09-16 ENCOUNTER — HOSPITAL ENCOUNTER (OUTPATIENT)
Dept: RADIOLOGY | Facility: CLINIC | Age: 79
Discharge: HOME | End: 2024-09-16
Attending: OBSTETRICS & GYNECOLOGY
Payer: MEDICARE

## 2024-09-16 DIAGNOSIS — Z12.31 BREAST CANCER SCREENING BY MAMMOGRAM: ICD-10-CM

## 2024-09-16 PROCEDURE — 77067 SCR MAMMO BI INCL CAD: CPT | Mod: TC,PO

## 2024-09-16 PROCEDURE — 77063 BREAST TOMOSYNTHESIS BI: CPT | Mod: 26,,, | Performed by: RADIOLOGY

## 2024-09-16 PROCEDURE — 77067 SCR MAMMO BI INCL CAD: CPT | Mod: 26,,, | Performed by: RADIOLOGY

## 2024-09-16 RX ORDER — PROPRANOLOL HYDROCHLORIDE 40 MG/1
40 TABLET ORAL 2 TIMES DAILY
Qty: 180 TABLET | Refills: 3 | Status: SHIPPED | OUTPATIENT
Start: 2024-09-16

## 2024-09-16 NOTE — TELEPHONE ENCOUNTER
Refill request for propranolol 40 mg tablets sent to [facility] in [place], [place] [address] [facility].     Last office visit was 07/01/2024.    NITHYA Lucas

## 2024-09-30 ENCOUNTER — OFFICE VISIT (OUTPATIENT)
Dept: OBSTETRICS AND GYNECOLOGY | Facility: CLINIC | Age: 79
End: 2024-09-30
Payer: MEDICARE

## 2024-09-30 ENCOUNTER — HOSPITAL ENCOUNTER (OUTPATIENT)
Facility: HOSPITAL | Age: 79
Discharge: HOME | End: 2024-10-01
Attending: EMERGENCY MEDICINE | Admitting: HOSPITALIST
Payer: MEDICARE

## 2024-09-30 VITALS
DIASTOLIC BLOOD PRESSURE: 60 MMHG | SYSTOLIC BLOOD PRESSURE: 102 MMHG | RESPIRATION RATE: 18 BRPM | HEIGHT: 67 IN | BODY MASS INDEX: 17.65 KG/M2 | WEIGHT: 112.44 LBS

## 2024-09-30 DIAGNOSIS — R20.0 NUMBNESS: Primary | ICD-10-CM

## 2024-09-30 DIAGNOSIS — Z12.72 ENCOUNTER FOR PAPANICOLAOU SMEAR OF VAGINA AS PART OF ROUTINE GYNECOLOGICAL EXAMINATION: Primary | ICD-10-CM

## 2024-09-30 DIAGNOSIS — R29.818 ACUTE FOCAL NEUROLOGICAL DEFICIT: ICD-10-CM

## 2024-09-30 DIAGNOSIS — Z01.419 ENCOUNTER FOR ROUTINE GYNECOLOGICAL EXAMINATION WITH PAPANICOLAOU SMEAR OF CERVIX: ICD-10-CM

## 2024-09-30 DIAGNOSIS — Z01.419 ENCOUNTER FOR PAPANICOLAOU SMEAR OF VAGINA AS PART OF ROUTINE GYNECOLOGICAL EXAMINATION: Primary | ICD-10-CM

## 2024-09-30 DIAGNOSIS — G45.9 TIA (TRANSIENT ISCHEMIC ATTACK): ICD-10-CM

## 2024-09-30 LAB
ALBUMIN SERPL BCP-MCNC: 3.8 G/DL (ref 3.5–5.2)
ALP SERPL-CCNC: 87 U/L (ref 55–135)
ALT SERPL W/O P-5'-P-CCNC: 24 U/L (ref 10–44)
ANION GAP SERPL CALC-SCNC: 14 MMOL/L (ref 8–16)
AST SERPL-CCNC: 32 U/L (ref 10–40)
BASOPHILS # BLD AUTO: 0.08 K/UL (ref 0–0.2)
BASOPHILS NFR BLD: 0.6 % (ref 0–1.9)
BILIRUB SERPL-MCNC: 0.9 MG/DL (ref 0.1–1)
BUN SERPL-MCNC: 34 MG/DL (ref 8–23)
CALCIUM SERPL-MCNC: 9.6 MG/DL (ref 8.7–10.5)
CHLORIDE SERPL-SCNC: 98 MMOL/L (ref 95–110)
CHOLEST SERPL-MCNC: 247 MG/DL (ref 120–199)
CHOLEST/HDLC SERPL: 3.2 {RATIO} (ref 2–5)
CO2 SERPL-SCNC: 22 MMOL/L (ref 23–29)
CREAT SERPL-MCNC: 1.1 MG/DL (ref 0.5–1.4)
DIFFERENTIAL METHOD BLD: ABNORMAL
EOSINOPHIL # BLD AUTO: 0.6 K/UL (ref 0–0.5)
EOSINOPHIL NFR BLD: 4.8 % (ref 0–8)
ERYTHROCYTE [DISTWIDTH] IN BLOOD BY AUTOMATED COUNT: 14.6 % (ref 11.5–14.5)
EST. GFR  (NO RACE VARIABLE): 50 ML/MIN/1.73 M^2
GLUCOSE SERPL-MCNC: 101 MG/DL (ref 70–110)
HCT VFR BLD AUTO: 38.4 % (ref 37–48.5)
HDLC SERPL-MCNC: 78 MG/DL (ref 40–75)
HDLC SERPL: 31.6 % (ref 20–50)
HGB BLD-MCNC: 12.7 G/DL (ref 12–16)
IMM GRANULOCYTES # BLD AUTO: 0.05 K/UL (ref 0–0.04)
IMM GRANULOCYTES NFR BLD AUTO: 0.4 % (ref 0–0.5)
INR PPP: 1 (ref 0.8–1.2)
LDLC SERPL CALC-MCNC: 141 MG/DL (ref 63–159)
LYMPHOCYTES # BLD AUTO: 1.3 K/UL (ref 1–4.8)
LYMPHOCYTES NFR BLD: 9.7 % (ref 18–48)
MCH RBC QN AUTO: 32.7 PG (ref 27–31)
MCHC RBC AUTO-ENTMCNC: 33.1 G/DL (ref 32–36)
MCV RBC AUTO: 99 FL (ref 82–98)
MONOCYTES # BLD AUTO: 1.4 K/UL (ref 0.3–1)
MONOCYTES NFR BLD: 10.5 % (ref 4–15)
NEUTROPHILS # BLD AUTO: 9.6 K/UL (ref 1.8–7.7)
NEUTROPHILS NFR BLD: 74 % (ref 38–73)
NONHDLC SERPL-MCNC: 169 MG/DL
NRBC BLD-RTO: 0 /100 WBC
PLATELET # BLD AUTO: 323 K/UL (ref 150–450)
PMV BLD AUTO: 8.8 FL (ref 9.2–12.9)
POC PTINR: 1.2 (ref 0.9–1.2)
POCT GLUCOSE: 95 MG/DL (ref 70–110)
POTASSIUM SERPL-SCNC: 4 MMOL/L (ref 3.5–5.1)
PROT SERPL-MCNC: 7.4 G/DL (ref 6–8.4)
PROTHROMBIN TIME: 11.1 SEC (ref 9–12.5)
RBC # BLD AUTO: 3.88 M/UL (ref 4–5.4)
SAMPLE: NORMAL
SODIUM SERPL-SCNC: 134 MMOL/L (ref 136–145)
TRIGL SERPL-MCNC: 140 MG/DL (ref 30–150)
TSH SERPL DL<=0.005 MIU/L-ACNC: 1.93 UIU/ML (ref 0.4–4)
WBC # BLD AUTO: 13.01 K/UL (ref 3.9–12.7)

## 2024-09-30 PROCEDURE — 80053 COMPREHEN METABOLIC PANEL: CPT | Performed by: EMERGENCY MEDICINE

## 2024-09-30 PROCEDURE — G0378 HOSPITAL OBSERVATION PER HR: HCPCS

## 2024-09-30 PROCEDURE — 3074F SYST BP LT 130 MM HG: CPT | Mod: CPTII,S$GLB,, | Performed by: OBSTETRICS & GYNECOLOGY

## 2024-09-30 PROCEDURE — 85025 COMPLETE CBC W/AUTO DIFF WBC: CPT | Performed by: EMERGENCY MEDICINE

## 2024-09-30 PROCEDURE — 82962 GLUCOSE BLOOD TEST: CPT

## 2024-09-30 PROCEDURE — 80061 LIPID PANEL: CPT | Performed by: EMERGENCY MEDICINE

## 2024-09-30 PROCEDURE — 1159F MED LIST DOCD IN RCRD: CPT | Mod: CPTII,S$GLB,, | Performed by: OBSTETRICS & GYNECOLOGY

## 2024-09-30 PROCEDURE — 84443 ASSAY THYROID STIM HORMONE: CPT | Performed by: EMERGENCY MEDICINE

## 2024-09-30 PROCEDURE — 25000003 PHARM REV CODE 250: Performed by: NURSE PRACTITIONER

## 2024-09-30 PROCEDURE — 36415 COLL VENOUS BLD VENIPUNCTURE: CPT | Performed by: EMERGENCY MEDICINE

## 2024-09-30 PROCEDURE — 88175 CYTOPATH C/V AUTO FLUID REDO: CPT | Performed by: OBSTETRICS & GYNECOLOGY

## 2024-09-30 PROCEDURE — 1126F AMNT PAIN NOTED NONE PRSNT: CPT | Mod: CPTII,S$GLB,, | Performed by: OBSTETRICS & GYNECOLOGY

## 2024-09-30 PROCEDURE — 99999 PR PBB SHADOW E&M-EST. PATIENT-LVL IV: CPT | Mod: PBBFAC,,, | Performed by: OBSTETRICS & GYNECOLOGY

## 2024-09-30 PROCEDURE — 85610 PROTHROMBIN TIME: CPT | Mod: 91

## 2024-09-30 PROCEDURE — 99900035 HC TECH TIME PER 15 MIN (STAT)

## 2024-09-30 PROCEDURE — 3078F DIAST BP <80 MM HG: CPT | Mod: CPTII,S$GLB,, | Performed by: OBSTETRICS & GYNECOLOGY

## 2024-09-30 PROCEDURE — 1101F PT FALLS ASSESS-DOCD LE1/YR: CPT | Mod: CPTII,S$GLB,, | Performed by: OBSTETRICS & GYNECOLOGY

## 2024-09-30 PROCEDURE — 1157F ADVNC CARE PLAN IN RCRD: CPT | Mod: CPTII,S$GLB,, | Performed by: OBSTETRICS & GYNECOLOGY

## 2024-09-30 PROCEDURE — G0101 CA SCREEN;PELVIC/BREAST EXAM: HCPCS | Mod: S$GLB,,, | Performed by: OBSTETRICS & GYNECOLOGY

## 2024-09-30 PROCEDURE — 93010 ELECTROCARDIOGRAM REPORT: CPT | Mod: ,,, | Performed by: GENERAL PRACTICE

## 2024-09-30 PROCEDURE — 25500020 PHARM REV CODE 255

## 2024-09-30 PROCEDURE — 93005 ELECTROCARDIOGRAM TRACING: CPT

## 2024-09-30 PROCEDURE — 85610 PROTHROMBIN TIME: CPT | Performed by: EMERGENCY MEDICINE

## 2024-09-30 PROCEDURE — 94760 N-INVAS EAR/PLS OXIMETRY 1: CPT

## 2024-09-30 PROCEDURE — 99285 EMERGENCY DEPT VISIT HI MDM: CPT | Mod: 25

## 2024-09-30 PROCEDURE — 3288F FALL RISK ASSESSMENT DOCD: CPT | Mod: CPTII,S$GLB,, | Performed by: OBSTETRICS & GYNECOLOGY

## 2024-09-30 RX ORDER — LATANOPROST 50 UG/ML
1 SOLUTION/ DROPS OPHTHALMIC DAILY
Status: DISCONTINUED | OUTPATIENT
Start: 2024-10-01 | End: 2024-10-01 | Stop reason: HOSPADM

## 2024-09-30 RX ORDER — ONDANSETRON HYDROCHLORIDE 2 MG/ML
4 INJECTION, SOLUTION INTRAVENOUS EVERY 6 HOURS PRN
Status: DISCONTINUED | OUTPATIENT
Start: 2024-09-30 | End: 2024-10-01 | Stop reason: HOSPADM

## 2024-09-30 RX ORDER — NAPROXEN SODIUM 220 MG/1
81 TABLET, FILM COATED ORAL DAILY
Status: DISCONTINUED | OUTPATIENT
Start: 2024-10-01 | End: 2024-10-01 | Stop reason: HOSPADM

## 2024-09-30 RX ORDER — BISACODYL 10 MG/1
10 SUPPOSITORY RECTAL DAILY PRN
Status: DISCONTINUED | OUTPATIENT
Start: 2024-09-30 | End: 2024-10-01 | Stop reason: HOSPADM

## 2024-09-30 RX ORDER — BUSPIRONE HYDROCHLORIDE 10 MG/1
10 TABLET ORAL 3 TIMES DAILY
Status: DISCONTINUED | OUTPATIENT
Start: 2024-09-30 | End: 2024-10-01 | Stop reason: HOSPADM

## 2024-09-30 RX ORDER — PROPRANOLOL HYDROCHLORIDE 20 MG/1
40 TABLET ORAL 2 TIMES DAILY
Status: DISCONTINUED | OUTPATIENT
Start: 2024-09-30 | End: 2024-10-01 | Stop reason: HOSPADM

## 2024-09-30 RX ORDER — SODIUM CHLORIDE 0.9 % (FLUSH) 0.9 %
10 SYRINGE (ML) INJECTION
Status: DISCONTINUED | OUTPATIENT
Start: 2024-09-30 | End: 2024-10-01 | Stop reason: HOSPADM

## 2024-09-30 RX ORDER — MIRTAZAPINE 7.5 MG/1
7.5 TABLET, FILM COATED ORAL NIGHTLY
Status: DISCONTINUED | OUTPATIENT
Start: 2024-09-30 | End: 2024-10-01 | Stop reason: HOSPADM

## 2024-09-30 RX ORDER — FAMOTIDINE 20 MG/1
20 TABLET, FILM COATED ORAL DAILY
Status: DISCONTINUED | OUTPATIENT
Start: 2024-09-30 | End: 2024-10-01 | Stop reason: HOSPADM

## 2024-09-30 RX ORDER — ASPIRIN 81 MG/1
81 TABLET ORAL DAILY
Status: DISCONTINUED | OUTPATIENT
Start: 2024-10-01 | End: 2024-09-30 | Stop reason: SDUPTHER

## 2024-09-30 RX ADMIN — MIRTAZAPINE 7.5 MG: 7.5 TABLET ORAL at 10:09

## 2024-09-30 RX ADMIN — IOHEXOL 75 ML: 350 INJECTION, SOLUTION INTRAVENOUS at 03:09

## 2024-09-30 RX ADMIN — APIXABAN 2.5 MG: 2.5 TABLET, FILM COATED ORAL at 10:09

## 2024-09-30 RX ADMIN — FAMOTIDINE 20 MG: 20 TABLET, FILM COATED ORAL at 08:09

## 2024-09-30 RX ADMIN — BUSPIRONE HYDROCHLORIDE 10 MG: 10 TABLET ORAL at 10:09

## 2024-09-30 RX ADMIN — PROPRANOLOL HYDROCHLORIDE 40 MG: 20 TABLET ORAL at 10:09

## 2024-09-30 NOTE — ASSESSMENT & PLAN NOTE
Left facial numbness (resolved)  Antithrombotics for secondary stroke prevention: Antiplatelets: Aspirin: 81 mg daily    Statins for secondary stroke prevention and hyperlipidemia, if present:   Statins: Atorvastatin- 40 mg daily    Aggressive risk factor modification: HTN     Rehab efforts: The patient has been evaluated by a stroke team provider and the therapy needs have been fully considered based off the presenting complaints and exam findings. The following therapy evaluations are needed: PT evaluate and treat, OT evaluate and treat, SLP evaluate and treat    Diagnostics ordered/pending: CTA Head to assess vasculature     VTE prophylaxis: Mechanical prophylaxis: Place SCDs    BP parameters: TIA: SBP <220 until imaging confirmation of no infarct

## 2024-09-30 NOTE — PROGRESS NOTES
Chief Complaint   Patient presents with    Well Woman       History and Physical:  No LMP recorded. Patient is postmenopausal.       Ayla Dela Cruz is a 79 y.o.   female who presents today for her routine annual GYN exam. The patient has no Gynecology complaints today. Left facial numbness, no muscle weakness.       Allergies:   Review of patient's allergies indicates:   Allergen Reactions    Gabapentin Hallucinations    Xarelto [rivaroxaban] Rash    Bactrim [sulfamethoxazole-trimethoprim] Other (See Comments)     Upset stomach, dry heaves, confusion    Amoxicillin-pot clavulanate      Other reaction(s): Mental Status Change    Atorvastatin      Other reaction(s): Joint pain    Baclofen     Baclofen (bulk) Nausea And Vomiting    Ciprofloxacin     Decongest tabs      Other reaction(s): increased heart rate    Decongestant [pseudoephedrine hcl]     Erythromycin Other (See Comments)    Flecainide Hives     And SOB. No reaction to Lidocaine     Fluoxetine      Other reaction(s): heart palpitations  Other reaction(s): anxiety    Lisinopril Other (See Comments)     cough    Losartan Other (See Comments)     Hypotension with lightheadedness    Morphine Other (See Comments)     confusion    Tramadol Other (See Comments)     SOB, low BP    Venlafaxine     Venlafaxine analogues      Changes in BP and increases heart rate       Caffeine Palpitations    Dabigatran etexilate Rash    Plavix [clopidogrel] Rash    Tizanidine Anxiety     dizziness       Past Medical History:   Diagnosis Date    Anticoagulant long-term use     Anxiety     Arthritis     Atrial fibrillation     Basal cell carcinoma     Cancer     skin    CHF (congestive heart failure)     Depression     DVT (deep venous thrombosis)     GERD (gastroesophageal reflux disease)     Glaucoma (increased eye pressure)     Hyperlipidemia     diet controlled    Hypertension     Interstitial lung disease     Localized hives 01/10/2020    Mild persistent  asthma without complication 11/12/2018    Pacemaker     Pneumonia 01/31/2014    Stroke 06/03/2014    Stroke     TIA (transient ischemic attack)     TIA (transient ischemic attack)        Past Surgical History:   Procedure Laterality Date    2 heart ablations      3 in total    A-V CARDIAC PACEMAKER INSERTION Left 10/14/2021    Procedure: INSERTION, CARDIAC PACEMAKER, DUAL CHAMBER;  Surgeon: Fco Calderon MD;  Location: [facility] EP LAB;  Service: Cardiology;  Laterality: Left;  PAF/ [place] Arrthymias, Dual PPM, SJM, MAC, GP, 3 PREP    ANTERIOR VITRECTOMY Right 7/27/2022    Procedure: VITRECTOMY, ANTERIOR APPROACH;  Surgeon: Daniel Tan MD;  Location: [facility] OR;  Service: Ophthalmology;  Laterality: Right;    bilateral cataracts      CHOLECYSTECTOMY      COLONOSCOPY N/A 1/19/2017    Procedure: COLONOSCOPY and EGD;  Surgeon: Jonathan Schultz MD;  Location: [facility];  Service: Endoscopy;  Laterality: N/A;    COLONOSCOPY N/A 10/10/2019    Procedure: COLONOSCOPY;  Surgeon: Alex Christian MD;  Location: [facility];  Service: Endoscopy;  Laterality: N/A;    ESOPHAGOGASTRODUODENOSCOPY N/A 10/14/2019    Procedure: EGD (ESOPHAGOGASTRODUODENOSCOPY);  Surgeon: Alex Christian MD;  Location: [facility];  Service: Endoscopy;  Laterality: N/A;    ESOPHAGOGASTRODUODENOSCOPY N/A 1/25/2024    Procedure: EGD (ESOPHAGOGASTRODUODENOSCOPY);  Surgeon: Alex Christian MD;  Location: [facility];  Service: Endoscopy;  Laterality: N/A;    INJECTION OF ANESTHETIC AGENT AROUND MEDIAL BRANCH NERVES INNERVATING CERVICAL FACET JOINT Right 6/7/2018    Procedure: BLOCK, NERVE, FACET JOINT, MEDIAL BRANCH, CERVICAL;  Surgeon: Galo Clancy MD;  Location: [facility] OR;  Service: Pain Management;  Laterality: Right;  C4, 5, 6    OPEN REDUCTION AND INTERNAL FIXATION (ORIF) OF INJURY OF ANKLE Right 11/1/2018    Procedure: ORIF, ANKLE;  Surgeon: Wilfredo Aguero MD;  Location: [facility];  Service: Orthopedics;  Laterality: Right;    PARACENTESIS, EYE, ANTERIOR  CHAMBER, WITH AQUEOUS REMOVAL Right 7/27/2022    Procedure: Anterior chamber washout, possible IOL removal;  Surgeon: Daniel Tan MD;  Location: [facility] OR;  Service: Ophthalmology;  Laterality: Right;    pyloristenosis      RADIOFREQUENCY ABLATION OF LUMBAR MEDIAL BRANCH NERVE AT SINGLE LEVEL Bilateral 9/21/2018    Procedure: RADIOFREQUENCY ABLATION, NERVE, SPINAL, LUMBAR, MEDIAL BRANCH, 1 LEVEL;  Surgeon: Galo Clancy MD;  Location: [facility] OR;  Service: Pain Management;  Laterality: Bilateral;  L3, 4, 5    RADIOFREQUENCY ABLATION OF LUMBAR MEDIAL BRANCH NERVE AT SINGLE LEVEL Bilateral 2/19/2019    Procedure: Radiofrequency Ablation, Nerve, Spinal, Lumbar, Medial Branch, 1 Level;  Surgeon: Galo Clancy MD;  Location: [facility] OR;  Service: Pain Management;  Laterality: Bilateral;  L3, 4, 5     RADIOFREQUENCY ABLATION OF LUMBAR MEDIAL BRANCH NERVE AT SINGLE LEVEL Bilateral 3/10/2020    Procedure: Radiofrequency Ablation, Nerve, Spinal, Lumbar, Medial Branch, 1 Level;  Surgeon: Galo Clancy MD;  Location: [facility] OR;  Service: Pain Management;  Laterality: Bilateral;  L3,4,5 - Burned at 80 degrees C. for 60 seconds x 2 each site      RADIOFREQUENCY ABLATION OF LUMBAR MEDIAL BRANCH NERVE AT SINGLE LEVEL Bilateral 9/11/2020    Procedure: Radiofrequency Ablation, Nerve, Spinal, Lumbar, Medial Branch, 1 Level;  Surgeon: Galo Clancy MD;  Location: [facility] OR;  Service: Pain Management;  Laterality: Bilateral;  L3, 4, 5 - Burned at 80 degrees C. for 60 seconds x 2 each site    RADIOFREQUENCY ABLATION OF LUMBAR MEDIAL BRANCH NERVE AT SINGLE LEVEL Bilateral 5/28/2021    Procedure: Radiofrequency Ablation, Nerve, Spinal, Lumbar, Medial Branch, 1 Level;  Surgeon: Galo Clancy MD;  Location: [facility] OR;  Service: Pain Management;  Laterality: Bilateral;  L3,4,5    RADIOFREQUENCY THERMAL COAGULATION OF MEDIAL BRANCH OF POSTERIOR RAMUS OF CERVICAL SPINAL NERVE Right 7/3/2018    Procedure: RADIOFREQUENCY THERMAL COAGULATION, NERVE, SPINAL,  CERVICAL, MEDIAL BRANCH OF POSTERIOR RAMUS;  Surgeon: Galo Clancy MD;  Location: [facility] OR;  Service: Pain Management;  Laterality: Right;  C4,5,6 - Burned at 80 degrees C. for 75 seconds each site    RADIOFREQUENCY THERMAL COAGULATION OF MEDIAL BRANCH OF POSTERIOR RAMUS OF CERVICAL SPINAL NERVE Right 7/23/2019    Procedure: RADIOFREQUENCY THERMAL COAGULATION, NERVE, SPINAL, CERVICAL, POSTERIOR RAMUS, MEDIAL BRANCH;  Surgeon: Galo Clancy MD;  Location: [facility] OR;  Service: Pain Management;  Laterality: Right;  C4,5,6    RADIOFREQUENCY THERMAL COAGULATION OF MEDIAL BRANCH OF POSTERIOR RAMUS OF CERVICAL SPINAL NERVE Right 6/23/2020    Procedure: RADIOFREQUENCY THERMAL COAGULATION, NERVE, SPINAL, CERVICAL, POSTERIOR RAMUS, MEDIAL BRANCH;  Surgeon: Galo Clancy MD;  Location: [facility] OR;  Service: Pain Management;  Laterality: Right;  C4, 5, 6    RADIOFREQUENCY THERMOCOAGULATION Bilateral 9/10/2019    Procedure: RADIOFREQUENCY THERMAL COAGULATION LUMBAR;  Surgeon: Galo Clancy MD;  Location: [facility] OR;  Service: Pain Management;  Laterality: Bilateral;  L3,4,5 - Burned at 80 degrees C. for 60 seconds x 2 each site    skin cancer removal       TONSILLECTOMY         MEDS:   Current Outpatient Medications on File Prior to Visit   Medication Sig Dispense Refill    apixaban (ELIQUIS) 2.5 mg Tab Take 1 tablet (2.5 mg total) by mouth 2 (two) times daily. 180 tablet 3    aspirin 81 MG Chew Take 81 mg by mouth once daily.      atropine 1% (ISOPTO ATROPINE) 1 % Drop PLACE 1 DROP INTO THE RIGHT EYE 2 TIMES A DAY. 5 mL 6    azelastine (ASTELIN) 137 mcg (0.1 %) nasal spray 1 spray (137 mcg total) by Nasal route 2 (two) times daily. 30 mL 5    busPIRone (BUSPAR) 10 MG tablet Take 1 tablet (10 mg total) by mouth 3 (three) times daily. 270 tablet 0    fluticasone-umeclidin-vilanter (TRELEGY ELLIPTA) 200-62.5-25 mcg inhaler Inhale 1 puff into the lungs once daily. 60 each 11    furosemide (LASIX) 20 MG tablet Take 1 tablet (20 mg total) by mouth  daily as needed (Edema). 10 tablet 3    latanoprost 0.005 % ophthalmic solution Place 1 drop into both eyes once daily. 2.5 mL 11    LORazepam (ATIVAN) 0.5 MG tablet Take 1 tablet (0.5 mg total) by mouth daily as needed for Anxiety. 30 tablet 0    mirtazapine (REMERON) 7.5 MG Tab Take 1 tablet (7.5 mg total) by mouth every evening. 90 tablet 0    pantoprazole (PROTONIX) 40 MG tablet TAKE 1 TABLET (40 MG TOTAL) BY MOUTH ONCE DAILY. 90 tablet 3    predniSONE (DELTASONE) 20 MG tablet Take one daily for 3 days and may repeat for shortness of breath. 12 tablet 0    propranoloL (INDERAL) 40 MG tablet TAKE 1 TABLET BY MOUTH TWICE A  tablet 3    triamcinolone acetonide 0.1% (KENALOG) 0.1 % cream AAA back bid PRN rash 80 g 3    triamterene-hydrochlorothiazide 75-50 mg (MAXZIDE) 75-50 mg per tablet Take 1 tablet by mouth once daily. 30 tablet 11    vortioxetine (TRINTELLIX) 20 mg Tab Take 1 tablet (20 mg total) by mouth Daily. 90 tablet 0     Current Facility-Administered Medications on File Prior to Visit   Medication Dose Route Frequency Provider Last Rate Last Admin    bupivacaine (PF) 0.25% (2.5 mg/ml) injection    PRGalo Montana MD   6 mL at 19 1314    lidocaine (PF) 10 mg/ml (1%) injection    Galo Finch MD   9 mL at 19 1304    lidocaine (PF) 20 mg/ml (2%) injection    PRGalo Montana MD   6 mL at 19 1310    methylPREDNISolone acetate injection    Galo Finch MD   80 mg at 19 1314       OB History          6    Para   6    Term   3            AB        Living             SAB        IAB        Ectopic        Multiple        Live Births                     Social History     Socioeconomic History    Marital status:    Tobacco Use    Smoking status: Never    Smokeless tobacco: Never   Substance and Sexual Activity    Alcohol use: No    Drug use: No    Sexual activity: Yes     Partners: Male     Social Drivers of Health     Financial Resource Strain: Low Risk   (4/25/2024)    Overall Financial Resource Strain (CARDIA)     Difficulty of Paying Living Expenses: Not hard at all   Food Insecurity: No Food Insecurity (4/25/2024)    Hunger Vital Sign     Worried About Running Out of Food in the Last Year: Never true     Ran Out of Food in the Last Year: Never true   Transportation Needs: No Transportation Needs (4/25/2024)    PRAPARE - Transportation     Lack of Transportation (Medical): No     Lack of Transportation (Non-Medical): No   Physical Activity: Inactive (4/25/2024)    Exercise Vital Sign     Days of Exercise per Week: 0 days     Minutes of Exercise per Session: 0 min   Stress: No Stress Concern Present (4/25/2024)    [employer] - Occupational Stress Questionnaire     Feeling of Stress : Only a little   Housing Stability: Low Risk  (4/25/2024)    Housing Stability Vital Sign     Unable to Pay for Housing in the Last Year: No     Homeless in the Last Year: No       Family History   Problem Relation Name Age of Onset    Arthritis Mother      Asthma Mother      Rheum arthritis Mother      Pneumonia Mother      Skin cancer Mother          unsure of type    Heart disease Father      Ulcers Father      Depression Son      Alzheimer's disease Maternal Uncle      Rheum arthritis Maternal Grandmother      Emphysema Maternal Grandfather      Cancer Maternal Grandfather          kidney    Kidney disease Maternal Grandfather      Cancer Paternal Grandmother          lung= smoker    Pneumonia Paternal Grandfather      Breast cancer Neg Hx      Ovarian cancer Neg Hx           Past medical and surgical history reviewed.   I have reviewed the patient's medical history in detail and updated the computerized patient record.    Review of System:   General: no chills, fever, night sweats, weight gain or weight loss  Psychological: no depression or suicidal ideation  Breasts: no new or changing breast lumps, nipple discharge or masses.  Respiratory: no cough,  "shortness of breath, or wheezing  Cardiovascular: no chest pain or dyspnea on exertion  Gastrointestinal: no abdominal pain, change in bowel habits, or black or bloody stools  Genito-Urinary: no incontinence, urinary frequency/urgency or vulvar/vaginal symptoms, pelvic pain or abnormal vaginal bleeding.  Musculoskeletal: no gait disturbance or muscular weakness      Physical Exam:   /60   Resp 18   Ht 5' 7" (1.702 m)   Wt 51 kg (112 lb 7 oz)   BMI 17.61 kg/m²   Constitutional: She appears alert and responsive. She appears well-developed, well-groomed, and well-nourished. No distress. thin  HENT:   Head: Normocephalic and atraumatic.   Eyes: Conjunctivae and EOM are normal. No scleral icterus.   Neck: Symmetrical. Normal range of motion. Neck supple. No tracheal deviation present.   Cardiovascular: Normal rate, no rhythm abnormality noted. Extremities without swelling or edema, warm.    Pulmonary/Chest: Normal respiratory Effort. No distress or retractions. She exhibits no tenderness.  Breasts: are symmetrical.   Right breast exhibits no inverted nipple, no mass, no nipple discharge, no skin change and no tenderness.   Left breast exhibits no inverted nipple, no mass, no nipple discharge, no skin change and no tenderness.  Abdominal: Soft. She exhibits no distension, hernias or masses. There is no tenderness. No enlargement of liver edge or spleen.  There is no rebound and no guarding.   Genitourinary:    External rectal exam shows no thrombosed external hemorrhoids, no lesions.     Pelvic exam was performed with patient supine.   No labial fusion, and symmetrical.    There is no rash, lesion or injury on the right labia.   There is no rash, lesion or injury on the left labia.   No bleeding and no signs of injury around the vaginal introitus, urethral meatus is normal size and without prolapse or lesions, urethra well supported. The cervix is visualized with no discharge, lesions or friability.   No vaginal " discharge found.    No significant Cystocele, Enterocele or rectocele, and cervix and uterus well supported.   Bimanual exam:   The urethra is normal to palpation and there are no palpable vaginal wall masses.   Uterus is not deviated, not enlarged, not fixed, normal shape and not tender.   Cervix exhibits no motion tenderness.    Right adnexum displays no mass or nodularity and no tenderness.   Left adnexum displays no mass or nodularity and no tenderness.  Musculoskeletal: Normal range of motion.   Neurological: She is alert and oriented to person, place, and time. Coordination normal.   Skin: Skin is warm and dry. She is not diaphoretic. No rashes, lesions or ulcers.   Psychiatric: She has a normal mood and affect, oriented to person, place, and time.      Assessment:   Normal annual GYN exam  1. Encounter for Papanicolaou smear of vagina as part of routine gynecological examination        2. Encounter for routine gynecological examination with Papanicolaou smear of cervix  Liquid-Based Pap Smear, Screening      Right facial numbness, recomemnded ER now - rule out stroke    Plan:   PAP today  ER- rule out stroke  Follow up in 2 years.  Patient informed will be contacted with results within 2 weeks. Encouraged to please call back or email if she has not heard from us by then.

## 2024-09-30 NOTE — HPI
Ayla Dela Cruz is an 79-year-old female who presents emergency room for evaluation of left face numbness which onset approximately 45 minutes prior to arrival to the emergency room.  She denies any focal weakness, dysarthria, or aphasia.  No facial asymmetry.  She reports a history of a TIA in the past (on Eliquis).  Denies fever or chills.  No known sick contacts or travel.  No aggravating alleviating factors.  Previous medical history includes paroxysmal AFib, pacemaker, hypertension, hyperlipidemia, GERD, interstitial lung disease, diastolic heart failure, iron-deficiency anemia, and obstructive sleep apnea on CPAP.  ER workup:  CBC and CMP were unremarkable.  CTA of the brain was negative for any acute findings.  Patient admitted to Hospital Medicine for treatment and management.  Patient placed on CVA pathway.  Will get neurology consult in a.m..

## 2024-09-30 NOTE — ED PROVIDER NOTES
Encounter Date: 9/30/2024       History     Chief Complaint   Patient presents with    Numbness     To left side of face beginning 45 minutes PTA     Patient is an 79-year-old woman with  has a past medical history of Anticoagulant long-term use, Anxiety, Arthritis, Atrial fibrillation, Basal cell carcinoma, Cancer, CHF (congestive heart failure), Depression, DVT (deep venous thrombosis), GERD (gastroesophageal reflux disease), Glaucoma (increased eye pressure), Hyperlipidemia, Hypertension, Interstitial lung disease, Localized hives, Mild persistent asthma without complication, Pacemaker, Pneumonia, Stroke, Stroke, TIA (transient ischemic attack), and TIA (transient ischemic attack) who presents emergency department complaining of left-sided facial numbness to the lower aspect of her face and neck that began approximately 45 minutes ago.  Patient states she has felt this in the past on the inside of her cheek on the left but not on the outside.  She denies any focal weakness but does state that her legs have been feeling weak for several weeks.          Review of patient's allergies indicates:   Allergen Reactions    Gabapentin Hallucinations    Xarelto [rivaroxaban] Rash    Bactrim [sulfamethoxazole-trimethoprim] Other (See Comments)     Upset stomach, dry heaves, confusion    Amoxicillin-pot clavulanate      Other reaction(s): Mental Status Change    Atorvastatin      Other reaction(s): Joint pain    Baclofen     Baclofen (bulk) Nausea And Vomiting    Ciprofloxacin     Decongest tabs      Other reaction(s): increased heart rate    Decongestant [pseudoephedrine hcl]     Erythromycin Other (See Comments)    Flecainide Hives     And SOB. No reaction to Lidocaine     Fluoxetine      Other reaction(s): heart palpitations  Other reaction(s): anxiety    Lisinopril Other (See Comments)     cough    Losartan Other (See Comments)     Hypotension with lightheadedness    Morphine Other (See Comments)     confusion     Tramadol Other (See Comments)     SOB, low BP    Venlafaxine     Venlafaxine analogues      Changes in BP and increases heart rate       Caffeine Palpitations    Dabigatran etexilate Rash    Plavix [clopidogrel] Rash    Tizanidine Anxiety     dizziness     Past Medical History:   Diagnosis Date    Anticoagulant long-term use     Anxiety     Arthritis     Atrial fibrillation     Basal cell carcinoma     Cancer     skin    CHF (congestive heart failure)     Depression     DVT (deep venous thrombosis)     GERD (gastroesophageal reflux disease)     Glaucoma (increased eye pressure)     Hyperlipidemia     diet controlled    Hypertension     Interstitial lung disease     Localized hives 01/10/2020    Mild persistent asthma without complication 11/12/2018    Pacemaker     Pneumonia 01/31/2014    Stroke 06/03/2014    Stroke     TIA (transient ischemic attack)     TIA (transient ischemic attack)      Past Surgical History:   Procedure Laterality Date    2 heart ablations      3 in total    A-V CARDIAC PACEMAKER INSERTION Left 10/14/2021    Procedure: INSERTION, CARDIAC PACEMAKER, DUAL CHAMBER;  Surgeon: Fco Calderon MD;  Location: [facility];  Service: Cardiology;  Laterality: Left;  PAF/ [place] Arrthymias, Dual PPM, SJM, MAC, GP, 3 PREP    ANTERIOR VITRECTOMY Right 7/27/2022    Procedure: VITRECTOMY, ANTERIOR APPROACH;  Surgeon: Daniel Tan MD;  Location: [facility] OR;  Service: Ophthalmology;  Laterality: Right;    bilateral cataracts      CHOLECYSTECTOMY      COLONOSCOPY N/A 1/19/2017    Procedure: COLONOSCOPY and EGD;  Surgeon: Jonathan Schultz MD;  Location: [facility];  Service: Endoscopy;  Laterality: N/A;    COLONOSCOPY N/A 10/10/2019    Procedure: COLONOSCOPY;  Surgeon: Alex Christian MD;  Location: [facility];  Service: Endoscopy;  Laterality: N/A;    ESOPHAGOGASTRODUODENOSCOPY N/A 10/14/2019    Procedure: EGD (ESOPHAGOGASTRODUODENOSCOPY);  Surgeon: Alex Christian MD;  Location: [facility];  Service: Endoscopy;   Laterality: N/A;    ESOPHAGOGASTRODUODENOSCOPY N/A 1/25/2024    Procedure: EGD (ESOPHAGOGASTRODUODENOSCOPY);  Surgeon: Alex Christian MD;  Location: [facility];  Service: Endoscopy;  Laterality: N/A;    INJECTION OF ANESTHETIC AGENT AROUND MEDIAL BRANCH NERVES INNERVATING CERVICAL FACET JOINT Right 6/7/2018    Procedure: BLOCK, NERVE, FACET JOINT, MEDIAL BRANCH, CERVICAL;  Surgeon: Galo Clancy MD;  Location: [facility];  Service: Pain Management;  Laterality: Right;  C4, 5, 6    OPEN REDUCTION AND INTERNAL FIXATION (ORIF) OF INJURY OF ANKLE Right 11/1/2018    Procedure: ORIF, ANKLE;  Surgeon: Wilfredo Aguero MD;  Location: [facility];  Service: Orthopedics;  Laterality: Right;    PARACENTESIS, EYE, ANTERIOR CHAMBER, WITH AQUEOUS REMOVAL Right 7/27/2022    Procedure: Anterior chamber washout, possible IOL removal;  Surgeon: Daniel Tan MD;  Location: [facility];  Service: Ophthalmology;  Laterality: Right;    pyloristenosis      RADIOFREQUENCY ABLATION OF LUMBAR MEDIAL BRANCH NERVE AT SINGLE LEVEL Bilateral 9/21/2018    Procedure: RADIOFREQUENCY ABLATION, NERVE, SPINAL, LUMBAR, MEDIAL BRANCH, 1 LEVEL;  Surgeon: Galo Clancy MD;  Location: [facility];  Service: Pain Management;  Laterality: Bilateral;  L3, 4, 5    RADIOFREQUENCY ABLATION OF LUMBAR MEDIAL BRANCH NERVE AT SINGLE LEVEL Bilateral 2/19/2019    Procedure: Radiofrequency Ablation, Nerve, Spinal, Lumbar, Medial Branch, 1 Level;  Surgeon: Galo Clancy MD;  Location: [facility];  Service: Pain Management;  Laterality: Bilateral;  L3, 4, 5     RADIOFREQUENCY ABLATION OF LUMBAR MEDIAL BRANCH NERVE AT SINGLE LEVEL Bilateral 3/10/2020    Procedure: Radiofrequency Ablation, Nerve, Spinal, Lumbar, Medial Branch, 1 Level;  Surgeon: Galo Clancy MD;  Location: [facility];  Service: Pain Management;  Laterality: Bilateral;  L3,4,5 - Burned at 80 degrees C. for 60 seconds x 2 each site      RADIOFREQUENCY ABLATION OF LUMBAR MEDIAL BRANCH NERVE AT SINGLE LEVEL Bilateral 9/11/2020     Procedure: Radiofrequency Ablation, Nerve, Spinal, Lumbar, Medial Branch, 1 Level;  Surgeon: Galo Clancy MD;  Location: [facility] OR;  Service: Pain Management;  Laterality: Bilateral;  L3, 4, 5 - Burned at 80 degrees C. for 60 seconds x 2 each site    RADIOFREQUENCY ABLATION OF LUMBAR MEDIAL BRANCH NERVE AT SINGLE LEVEL Bilateral 5/28/2021    Procedure: Radiofrequency Ablation, Nerve, Spinal, Lumbar, Medial Branch, 1 Level;  Surgeon: Galo Clancy MD;  Location: [facility] OR;  Service: Pain Management;  Laterality: Bilateral;  L3,4,5    RADIOFREQUENCY THERMAL COAGULATION OF MEDIAL BRANCH OF POSTERIOR RAMUS OF CERVICAL SPINAL NERVE Right 7/3/2018    Procedure: RADIOFREQUENCY THERMAL COAGULATION, NERVE, SPINAL, CERVICAL, MEDIAL BRANCH OF POSTERIOR RAMUS;  Surgeon: Galo Clancy MD;  Location: [facility] OR;  Service: Pain Management;  Laterality: Right;  C4,5,6 - Burned at 80 degrees C. for 75 seconds each site    RADIOFREQUENCY THERMAL COAGULATION OF MEDIAL BRANCH OF POSTERIOR RAMUS OF CERVICAL SPINAL NERVE Right 7/23/2019    Procedure: RADIOFREQUENCY THERMAL COAGULATION, NERVE, SPINAL, CERVICAL, POSTERIOR RAMUS, MEDIAL BRANCH;  Surgeon: Galo Clancy MD;  Location: [facility] OR;  Service: Pain Management;  Laterality: Right;  C4,5,6    RADIOFREQUENCY THERMAL COAGULATION OF MEDIAL BRANCH OF POSTERIOR RAMUS OF CERVICAL SPINAL NERVE Right 6/23/2020    Procedure: RADIOFREQUENCY THERMAL COAGULATION, NERVE, SPINAL, CERVICAL, POSTERIOR RAMUS, MEDIAL BRANCH;  Surgeon: Galo Clancy MD;  Location: [facility] OR;  Service: Pain Management;  Laterality: Right;  C4, 5, 6    RADIOFREQUENCY THERMOCOAGULATION Bilateral 9/10/2019    Procedure: RADIOFREQUENCY THERMAL COAGULATION LUMBAR;  Surgeon: Galo Clancy MD;  Location: [facility] OR;  Service: Pain Management;  Laterality: Bilateral;  L3,4,5 - Burned at 80 degrees C. for 60 seconds x 2 each site    skin cancer removal       TONSILLECTOMY       Family History   Problem Relation Name Age of Onset    Arthritis Mother       Asthma Mother      Rheum arthritis Mother      Pneumonia Mother      Skin cancer Mother          unsure of type    Heart disease Father      Ulcers Father      Depression Son      Alzheimer's disease Maternal Uncle      Rheum arthritis Maternal Grandmother      Emphysema Maternal Grandfather      Cancer Maternal Grandfather          kidney    Kidney disease Maternal Grandfather      Cancer Paternal Grandmother          lung= smoker    Pneumonia Paternal Grandfather      Breast cancer Neg Hx      Ovarian cancer Neg Hx       Social History     Tobacco Use    Smoking status: Never    Smokeless tobacco: Never   Substance Use Topics    Alcohol use: No    Drug use: No     Review of Systems    Physical Exam     Initial Vitals [09/30/24 1452]   BP Pulse Resp Temp SpO2   138/61 63 18 97.7 °F (36.5 °C) 98 %      MAP       --         Physical Exam    Constitutional: Vital signs are normal. She appears well-developed and well-nourished.  Non-toxic appearance. No distress.   HENT:   Head: Normocephalic and atraumatic.   Eyes: EOM are normal. Pupils are equal, round, and reactive to light.   Neck: Neck supple. No JVD present.   Normal range of motion.  Cardiovascular:  Normal rate, regular rhythm, normal heart sounds and intact distal pulses.     Exam reveals no gallop and no friction rub.       No murmur heard.  Pulmonary/Chest: Breath sounds normal. She has no wheezes. She has no rhonchi. She has no rales.   Abdominal: Abdomen is soft. Bowel sounds are normal. There is no abdominal tenderness. There is no rebound and no guarding.   Musculoskeletal:         General: Normal range of motion.      Cervical back: Normal range of motion and neck supple. No rigidity.     Neurological: She is alert and oriented to person, place, and time. She has normal strength and normal reflexes. No cranial nerve deficit or sensory deficit. She exhibits normal muscle tone. Coordination normal. GCS score is 15. GCS eye subscore is 4. GCS verbal  subscore is 5. GCS motor subscore is 6.   Patient able to feel pressure and soft touch symmetrically on both sides of the face.  There is no facial asymmetry.  Able to swallow water without difficulty.  She has tandem gait without difficulty.   Skin: Skin is warm and dry.   Psychiatric: She has a normal mood and affect. Her speech is normal and behavior is normal. She is not actively hallucinating.         ED Course   Procedures  Labs Reviewed   CBC W/ AUTO DIFFERENTIAL - Abnormal       Result Value    WBC 13.01 (*)     RBC 3.88 (*)     Hemoglobin 12.7      Hematocrit 38.4      MCV 99 (*)     MCH 32.7 (*)     MCHC 33.1      RDW 14.6 (*)     Platelets 323      MPV 8.8 (*)     Immature Granulocytes 0.4      Gran # (ANC) 9.6 (*)     Immature Grans (Abs) 0.05 (*)     Lymph # 1.3      Mono # 1.4 (*)     Eos # 0.6 (*)     Baso # 0.08      nRBC 0      Gran % 74.0 (*)     Lymph % 9.7 (*)     Mono % 10.5      Eosinophil % 4.8      Basophil % 0.6      Differential Method Automated     COMPREHENSIVE METABOLIC PANEL - Abnormal    Sodium 134 (*)     Potassium 4.0      Chloride 98      CO2 22 (*)     Glucose 101      BUN 34 (*)     Creatinine 1.1      Calcium 9.6      Total Protein 7.4      Albumin 3.8      Total Bilirubin 0.9      Alkaline Phosphatase 87      AST 32      ALT 24      eGFR 50 (*)     Anion Gap 14     LIPID PANEL - Abnormal    Cholesterol 247 (*)     Triglycerides 140      HDL 78 (*)     LDL Cholesterol 141.0      HDL/Cholesterol Ratio 31.6      Total Cholesterol/HDL Ratio 3.2      Non-HDL Cholesterol 169     PROTIME-INR    Prothrombin Time 11.1      INR 1.0     TSH    TSH 1.934     POCT GLUCOSE, HAND-HELD DEVICE   POCT GLUCOSE    POCT Glucose 95     ISTAT PROCEDURE    POC PTINR 1.2      Sample unknown     POCT PROTIME-INR          Imaging Results              CTA Head and Neck (xpd) (Final result)  Result time 09/30/24 15:38:15      Final result by Kaushal Forte MD (09/30/24 15:38:15)                   Impression:       1. No evidence of an acute intracranial abnormality. Further evaluation with MRI could be performed, as clinically warranted.  2. Mild generalized cerebral volume loss and mild scattered nonspecific supratentorial white matter hypoattenuation probably reflecting sequelae of chronic small vessel ischemic change.  3. No intracranial high-grade stenosis, large vessel occlusion, aneurysm or arteriovenous malformation.  4. No hemodynamically significant stenosis, major branch occlusion, aneurysm or dissection in the neck arterial vasculature.  5. Stable beaded appearance of the right cervical internal carotid artery which may be secondary to atherosclerotic disease or fibromuscular dysplasia.  6. Enlarged, multinodular thyroid gland, better characterized on prior dedicated thyroid ultrasound.  The significant finding above was relayed by myself  by telephone to Ross Villar MD on 09/30/2024 at 15:34.      Electronically signed by: Kaushal Forte  Date:    09/30/2024  Time:    15:38               Narrative:    EXAMINATION:  CTA HEAD AND NECK (XPD)    CLINICAL HISTORY:  Neuro deficit, acute, stroke suspected;    TECHNIQUE:  Non contrast low dose axial images were obtained through the head. CT angiogram was performed from the level of the ortega to the top of the head following the IV administration of 75mL of Omnipaque 350.   Sagittal and coronal reconstructions and maximum intensity projection reconstructions were performed. Arterial stenosis percentages are based on NASCET measurement criteria.    COMPARISON:  MRI brain 11/09/2023, CTA head neck 10/25/2023    FINDINGS:  Brain:    No evidence of acute intracranial hemorrhage.    The ventricles and sulci are appropriate in size and configuration for the patient's age without hydrocephalus.    Mild scattered hypoattenuation within the supratentorial white matter, nonspecific but probably reflecting sequelae of chronic small vessel ischemic change.  There is no  significant mass effect, midline shift, or extra-axial fluid collection. Gray-white matter differentiation is within normal limits without evidence of an acute major vascular territory infarct.    No abnormal intracranial enhancement.    Bilateral lens replacements are noted.  The paranasal sinuses are normal.  The visualized portions of the mastoids are unremarkable. No acute calvarial fracture.    Extracranial:    Aorta and great vessels: Left-sided aortic arch with normal configuration.   No evidence of hemodynamically significant stenosis of the origins of the great vessels from the arch.    Subclavian arteries: The subclavian arteries are without hemodynamically significant stenosis or occlusion.    Right carotid: The right common carotid artery is without significant stenosis. Mild calcific atherosclerotic plaque at the carotid bifurcation.  The right internal carotid artery is without hemodynamically significant (>50%) stenosis, occlusion, or dissection.  Persistent luminal irregularity with somewhat beaded appearance of the proximal/mid cervical internal carotid artery, which may reflect changes of atherosclerotic disease or fibromuscular dysplasia, similar to prior exam.    Left carotid: The left common carotid artery is without significant stenosis. Moderate calcific atherosclerotic plaque at the carotid bifurcation.  The left internal carotid artery is without hemodynamically significant (>50%) stenosis, occlusion, or dissection.    Extracranial vertebral arteries: The left vertebral artery is dominant.  The vertebral arteries are without significant stenosis, occlusion, or dissection to the skull base.    Intracranial:    Anterior circulation: Mild calcific atherosclerosis of bilateral carotid artery siphons.   No areas of significant atherosclerotic narrowing or filling defects are identified.  The middle cerebral arteries are normal.  Hypoplastic right anterior cerebral artery A1 segment with the A2  segment predominantly fed by the anterior communicating artery, normal developmental variant.  Left anterior cerebral artery is normal.    Posterior circulation: The vertebral arteries are normal. The basilar artery is normal. The posterior cerebral arteries are normal.    No evidence of aneurysm or arteriovenous malformation.    Dural venous sinuses are patent.    Other:    Left anterior chest wall pacemaker device with transvenous leads extending to the heart partially imaged.    The visualized portions of the lung apices are grossly clear.  Stable 3 mm ground-glass nodule in the left upper lobe.  Sub center calcified granulomas in the posterior right upper lobe.    Enlarged, multinodular thyroid gland, better characterized on prior dedicated thyroid ultrasound.    There are degenerative spine changes. No concerning osseous lesions identified.                                       Medications   iohexoL (OMNIPAQUE 350) 350 mg iodine/mL injection (75 mLs Intravenous Given 9/30/24 1506)     Medical Decision Making  79-year-old woman presents emergency department for evaluation of left facial numbness.  On examination she does not have objective numbness.  Differential diagnosis includes electrolyte abnormality, anxiety, ischemic CVA, multiple sclerosis.   Stroke workup initiated in the emergency department and showed no evidence of acute ischemia on CT head/CTA.  She has normal potassium 4.0, sodium 134, creatinine 1.1.  Discussed with neurology who recommends observation with neuro checks and patient is to continue her Eliquis and baby aspirin.  Discussed Hospital Medicine who agrees to admit the patient for observation.    Amount and/or Complexity of Data Reviewed  Labs: ordered.  Radiology: ordered.    Critical Care  Total time providing critical care: 45 minutes               ED Course as of 09/30/24 1812   Mon Sep 30, 2024   1533 Atrial fibrillation with occasional atrial paced complexes, 69 beats per minute  with anterior fascicular block, prolonged  milliseconds [AS]      ED Course User Index  [AS] Ross Villar MD                           Clinical Impression:  Final diagnoses:  [R29.818] Acute focal neurological deficit  [R20.0] Numbness (Primary)  [G45.9] TIA (transient ischemic attack)          ED Disposition Condition    Observation Stable                Ross Villar MD  09/30/24 4581

## 2024-09-30 NOTE — H&P
Formerly [facility] Medicine  History & Physical    Patient Name: Ayla Dela Cruz  MRN: [id number]  Patient Class: OP- Observation  Admission Date: 9/30/2024  Attending Physician: Roby Montenegro MD   Primary Care Provider: Jan Olivo MD         Patient information was obtained from patient, spouse/SO, past medical records, and ER records.     Subjective:     Principal Problem:TIA (transient ischemic attack)    Chief Complaint:   Chief Complaint   Patient presents with    Numbness     To left side of face beginning 45 minutes PTA        HPI: Ayla Dela Cruz is an 79-year-old female who presents emergency room for evaluation of left face numbness which onset approximately 45 minutes prior to arrival to the emergency room.  She denies any focal weakness, dysarthria, or aphasia.  No facial asymmetry.  She reports a history of a TIA in the past (on Eliquis).  Denies fever or chills.  No known sick contacts or travel.  No aggravating alleviating factors.  Previous medical history includes paroxysmal AFib, pacemaker, hypertension, hyperlipidemia, GERD, interstitial lung disease, diastolic heart failure, iron-deficiency anemia, and obstructive sleep apnea on CPAP.  ER workup:  CBC and CMP were unremarkable.  CTA of the brain was negative for any acute findings.  Patient admitted to Hospital Medicine for treatment and management.  Patient placed on CVA pathway.  Will get neurology consult in a.m..            Past Medical History:   Diagnosis Date    Anticoagulant long-term use     Anxiety     Arthritis     Atrial fibrillation     Basal cell carcinoma     Cancer     skin    CHF (congestive heart failure)     Depression     DVT (deep venous thrombosis)     GERD (gastroesophageal reflux disease)     Glaucoma (increased eye pressure)     Hyperlipidemia     diet controlled    Hypertension     Interstitial lung disease     Localized hives 01/10/2020    Mild persistent asthma without complication  11/12/2018    Pacemaker     Pneumonia 01/31/2014    Stroke 06/03/2014    Stroke     TIA (transient ischemic attack)     TIA (transient ischemic attack)        Past Surgical History:   Procedure Laterality Date    2 heart ablations      3 in total    A-V CARDIAC PACEMAKER INSERTION Left 10/14/2021    Procedure: INSERTION, CARDIAC PACEMAKER, DUAL CHAMBER;  Surgeon: Fco Calderon MD;  Location: [facility] EP LAB;  Service: Cardiology;  Laterality: Left;  PAF/ Klahr Arrthymias, Dual PPM, SJM, MAC, GP, 3 PREP    ANTERIOR VITRECTOMY Right 7/27/2022    Procedure: VITRECTOMY, ANTERIOR APPROACH;  Surgeon: Daniel Tan MD;  Location: [facility] OR;  Service: Ophthalmology;  Laterality: Right;    bilateral cataracts      CHOLECYSTECTOMY      COLONOSCOPY N/A 1/19/2017    Procedure: COLONOSCOPY and EGD;  Surgeon: Jonathan Schultz MD;  Location: [facility];  Service: Endoscopy;  Laterality: N/A;    COLONOSCOPY N/A 10/10/2019    Procedure: COLONOSCOPY;  Surgeon: Alex Christian MD;  Location: [facility];  Service: Endoscopy;  Laterality: N/A;    ESOPHAGOGASTRODUODENOSCOPY N/A 10/14/2019    Procedure: EGD (ESOPHAGOGASTRODUODENOSCOPY);  Surgeon: Alex Christian MD;  Location: [facility];  Service: Endoscopy;  Laterality: N/A;    ESOPHAGOGASTRODUODENOSCOPY N/A 1/25/2024    Procedure: EGD (ESOPHAGOGASTRODUODENOSCOPY);  Surgeon: Alex Christian MD;  Location: [facility];  Service: Endoscopy;  Laterality: N/A;    INJECTION OF ANESTHETIC AGENT AROUND MEDIAL BRANCH NERVES INNERVATING CERVICAL FACET JOINT Right 6/7/2018    Procedure: BLOCK, NERVE, FACET JOINT, MEDIAL BRANCH, CERVICAL;  Surgeon: Galo Clancy MD;  Location: [facility] OR;  Service: Pain Management;  Laterality: Right;  C4, 5, 6    OPEN REDUCTION AND INTERNAL FIXATION (ORIF) OF INJURY OF ANKLE Right 11/1/2018    Procedure: ORIF, ANKLE;  Surgeon: Wilfredo Aguero MD;  Location: [facility];  Service: Orthopedics;  Laterality: Right;    PARACENTESIS, EYE, ANTERIOR CHAMBER, WITH AQUEOUS REMOVAL  Right 7/27/2022    Procedure: Anterior chamber washout, possible IOL removal;  Surgeon: Daniel Tan MD;  Location: [facility] OR;  Service: Ophthalmology;  Laterality: Right;    pyloristenosis      RADIOFREQUENCY ABLATION OF LUMBAR MEDIAL BRANCH NERVE AT SINGLE LEVEL Bilateral 9/21/2018    Procedure: RADIOFREQUENCY ABLATION, NERVE, SPINAL, LUMBAR, MEDIAL BRANCH, 1 LEVEL;  Surgeon: Galo Clancy MD;  Location: [facility] OR;  Service: Pain Management;  Laterality: Bilateral;  L3, 4, 5    RADIOFREQUENCY ABLATION OF LUMBAR MEDIAL BRANCH NERVE AT SINGLE LEVEL Bilateral 2/19/2019    Procedure: Radiofrequency Ablation, Nerve, Spinal, Lumbar, Medial Branch, 1 Level;  Surgeon: Galo Clancy MD;  Location: [facility] OR;  Service: Pain Management;  Laterality: Bilateral;  L3, 4, 5     RADIOFREQUENCY ABLATION OF LUMBAR MEDIAL BRANCH NERVE AT SINGLE LEVEL Bilateral 3/10/2020    Procedure: Radiofrequency Ablation, Nerve, Spinal, Lumbar, Medial Branch, 1 Level;  Surgeon: Galo Clancy MD;  Location: [facility] OR;  Service: Pain Management;  Laterality: Bilateral;  L3,4,5 - Burned at 80 degrees C. for 60 seconds x 2 each site      RADIOFREQUENCY ABLATION OF LUMBAR MEDIAL BRANCH NERVE AT SINGLE LEVEL Bilateral 9/11/2020    Procedure: Radiofrequency Ablation, Nerve, Spinal, Lumbar, Medial Branch, 1 Level;  Surgeon: Galo Clancy MD;  Location: [facility] OR;  Service: Pain Management;  Laterality: Bilateral;  L3, 4, 5 - Burned at 80 degrees C. for 60 seconds x 2 each site    RADIOFREQUENCY ABLATION OF LUMBAR MEDIAL BRANCH NERVE AT SINGLE LEVEL Bilateral 5/28/2021    Procedure: Radiofrequency Ablation, Nerve, Spinal, Lumbar, Medial Branch, 1 Level;  Surgeon: Galo Clancy MD;  Location: [facility] OR;  Service: Pain Management;  Laterality: Bilateral;  L3,4,5    RADIOFREQUENCY THERMAL COAGULATION OF MEDIAL BRANCH OF POSTERIOR RAMUS OF CERVICAL SPINAL NERVE Right 7/3/2018    Procedure: RADIOFREQUENCY THERMAL COAGULATION, NERVE, SPINAL, CERVICAL, MEDIAL BRANCH OF POSTERIOR  RAMUS;  Surgeon: Galo Clancy MD;  Location: [facility] OR;  Service: Pain Management;  Laterality: Right;  C4,5,6 - Burned at 80 degrees C. for 75 seconds each site    RADIOFREQUENCY THERMAL COAGULATION OF MEDIAL BRANCH OF POSTERIOR RAMUS OF CERVICAL SPINAL NERVE Right 7/23/2019    Procedure: RADIOFREQUENCY THERMAL COAGULATION, NERVE, SPINAL, CERVICAL, POSTERIOR RAMUS, MEDIAL BRANCH;  Surgeon: Galo Clancy MD;  Location: [facility] OR;  Service: Pain Management;  Laterality: Right;  C4,5,6    RADIOFREQUENCY THERMAL COAGULATION OF MEDIAL BRANCH OF POSTERIOR RAMUS OF CERVICAL SPINAL NERVE Right 6/23/2020    Procedure: RADIOFREQUENCY THERMAL COAGULATION, NERVE, SPINAL, CERVICAL, POSTERIOR RAMUS, MEDIAL BRANCH;  Surgeon: Galo Clancy MD;  Location: [facility] OR;  Service: Pain Management;  Laterality: Right;  C4, 5, 6    RADIOFREQUENCY THERMOCOAGULATION Bilateral 9/10/2019    Procedure: RADIOFREQUENCY THERMAL COAGULATION LUMBAR;  Surgeon: Galo Clancy MD;  Location: [facility] OR;  Service: Pain Management;  Laterality: Bilateral;  L3,4,5 - Burned at 80 degrees C. for 60 seconds x 2 each site    skin cancer removal       TONSILLECTOMY         Review of patient's allergies indicates:   Allergen Reactions    Gabapentin Hallucinations    Xarelto [rivaroxaban] Rash    Bactrim [sulfamethoxazole-trimethoprim] Other (See Comments)     Upset stomach, dry heaves, confusion    Amoxicillin-pot clavulanate      Other reaction(s): Mental Status Change    Atorvastatin      Other reaction(s): Joint pain    Baclofen     Baclofen (bulk) Nausea And Vomiting    Ciprofloxacin     Decongest tabs      Other reaction(s): increased heart rate    Decongestant [pseudoephedrine hcl]     Erythromycin Other (See Comments)    Flecainide Hives     And SOB. No reaction to Lidocaine     Fluoxetine      Other reaction(s): heart palpitations  Other reaction(s): anxiety    Lisinopril Other (See Comments)     cough    Losartan Other (See Comments)     Hypotension with  lightheadedness    Morphine Other (See Comments)     confusion    Tramadol Other (See Comments)     SOB, low BP    Venlafaxine     Venlafaxine analogues      Changes in BP and increases heart rate       Caffeine Palpitations    Dabigatran etexilate Rash    Plavix [clopidogrel] Rash    Tizanidine Anxiety     dizziness       Current Facility-Administered Medications on File Prior to Encounter   Medication    bupivacaine (PF) 0.25% (2.5 mg/ml) injection    lidocaine (PF) 10 mg/ml (1%) injection    lidocaine (PF) 20 mg/ml (2%) injection    methylPREDNISolone acetate injection     Current Outpatient Medications on File Prior to Encounter   Medication Sig    apixaban (ELIQUIS) 2.5 mg Tab Take 1 tablet (2.5 mg total) by mouth 2 (two) times daily.    aspirin 81 MG Chew Take 81 mg by mouth once daily.    atropine 1% (ISOPTO ATROPINE) 1 % Drop PLACE 1 DROP INTO THE RIGHT EYE 2 TIMES A DAY. (Patient taking differently: Place 1 drop into the right eye 2 (two) times a day.)    azelastine (ASTELIN) 137 mcg (0.1 %) nasal spray 1 spray (137 mcg total) by Nasal route 2 (two) times daily.    busPIRone (BUSPAR) 10 MG tablet Take 1 tablet (10 mg total) by mouth 3 (three) times daily.    furosemide (LASIX) 20 MG tablet Take 1 tablet (20 mg total) by mouth daily as needed (Edema).    latanoprost 0.005 % ophthalmic solution Place 1 drop into both eyes once daily.    mirtazapine (REMERON) 7.5 MG Tab Take 1 tablet (7.5 mg total) by mouth every evening.    pantoprazole (PROTONIX) 40 MG tablet TAKE 1 TABLET (40 MG TOTAL) BY MOUTH ONCE DAILY.    predniSONE (DELTASONE) 20 MG tablet Take one daily for 3 days and may repeat for shortness of breath. (Patient taking differently: Take 20 mg by mouth As instructed. Take one daily for 3 days and may repeat for shortness of breath.)    propranoloL (INDERAL) 40 MG tablet TAKE 1 TABLET BY MOUTH TWICE A DAY    triamterene-hydrochlorothiazide 75-50 mg (MAXZIDE) 75-50 mg per tablet Take 1 tablet by mouth  once daily.    vortioxetine (TRINTELLIX) 20 mg Tab Take 1 tablet (20 mg total) by mouth Daily.    [DISCONTINUED] fluticasone-umeclidin-vilanter (TRELEGY ELLIPTA) 200-62.5-25 mcg inhaler Inhale 1 puff into the lungs once daily.    [DISCONTINUED] LORazepam (ATIVAN) 0.5 MG tablet Take 1 tablet (0.5 mg total) by mouth daily as needed for Anxiety.    [DISCONTINUED] triamcinolone acetonide 0.1% (KENALOG) 0.1 % cream AAA back bid PRN rash     Family History       Problem Relation (Age of Onset)    Alzheimer's disease Maternal Uncle    Arthritis Mother    Asthma Mother    Cancer Maternal Grandfather, Paternal Grandmother    Depression Son    Emphysema Maternal Grandfather    Heart disease Father    Kidney disease Maternal Grandfather    Pneumonia Mother, Paternal Grandfather    Rheum arthritis Mother, Maternal Grandmother    Skin cancer Mother    Ulcers Father          Tobacco Use    Smoking status: Never    Smokeless tobacco: Never   Substance and Sexual Activity    Alcohol use: No    Drug use: No    Sexual activity: Yes     Partners: Male     Review of Systems   Constitutional:  Negative for activity change, chills, diaphoresis and fever.   HENT:  Negative for congestion, nosebleeds and tinnitus.    Eyes:  Negative for photophobia and visual disturbance.   Respiratory:  Negative for cough, chest tightness, shortness of breath and wheezing.    Cardiovascular:  Negative for chest pain, palpitations and leg swelling.   Gastrointestinal:  Negative for abdominal distention, abdominal pain, constipation, diarrhea, nausea and vomiting.   Endocrine: Negative for cold intolerance and heat intolerance.   Genitourinary:  Negative for difficulty urinating, dysuria, frequency, hematuria and urgency.   Musculoskeletal:  Negative for arthralgias, back pain and myalgias.   Skin:  Negative for pallor, rash and wound.   Allergic/Immunologic: Negative for immunocompromised state.   Neurological:  Positive for numbness. Negative for  dizziness, tremors, facial asymmetry, speech difficulty and weakness.   Hematological:  Negative for adenopathy. Does not bruise/bleed easily.   Psychiatric/Behavioral:  Negative for confusion and sleep disturbance. The patient is not nervous/anxious.      Objective:     Vital Signs (Most Recent):  Temp: 97.7 °F (36.5 °C) (09/30/24 1452)  Pulse: 64 (09/30/24 1833)  Resp: 18 (09/30/24 1452)  BP: (!) 100/58 (09/30/24 1833)  SpO2: 97 % (09/30/24 1833) Vital Signs (24h Range):  Temp:  [97.7 °F (36.5 °C)] 97.7 °F (36.5 °C)  Pulse:  [63-69] 64  Resp:  [18] 18  SpO2:  [97 %-100 %] 97 %  BP: (100-138)/(58-61) 100/58     Weight: 50.3 kg (111 lb)  Body mass index is 17.39 kg/m².     Physical Exam  Vitals and nursing note reviewed.   Constitutional:       General: She is not in acute distress.     Appearance: She is well-developed. She is not diaphoretic.   HENT:      Head: Normocephalic.      Mouth/Throat:      Mouth: Mucous membranes are moist.      Pharynx: Oropharynx is clear.   Eyes:      General: No scleral icterus.     Conjunctiva/sclera: Conjunctivae normal.      Pupils: Pupils are equal, round, and reactive to light.   Neck:      Vascular: No JVD.   Cardiovascular:      Rate and Rhythm: Normal rate and regular rhythm.      Heart sounds: Normal heart sounds. No murmur heard.     No friction rub. No gallop.   Pulmonary:      Effort: Pulmonary effort is normal. No respiratory distress.      Breath sounds: Normal breath sounds. No wheezing or rales.   Abdominal:      General: Bowel sounds are normal. There is no distension.      Palpations: Abdomen is soft.      Tenderness: There is no abdominal tenderness. There is no guarding or rebound.   Musculoskeletal:         General: No tenderness. Normal range of motion.      Cervical back: Normal range of motion and neck supple.   Lymphadenopathy:      Cervical: No cervical adenopathy.   Skin:     General: Skin is warm and dry.      Capillary Refill: Capillary refill takes less  than 2 seconds.      Coloration: Skin is not pale.      Findings: No erythema or rash.   Neurological:      Mental Status: She is alert and oriented to person, place, and time.      Cranial Nerves: No cranial nerve deficit.      Sensory: No sensory deficit.      Coordination: Coordination normal.      Deep Tendon Reflexes: Reflexes normal.   Psychiatric:         Behavior: Behavior normal.         Thought Content: Thought content normal.         Judgment: Judgment normal.              CRANIAL NERVES     CN III, IV, VI   Pupils are equal, round, and reactive to light.       Significant Labs: All pertinent labs within the past 24 hours have been reviewed.  CBC:   Recent Labs   Lab 09/30/24  1510   WBC 13.01*   HGB 12.7   HCT 38.4        CMP:   Recent Labs   Lab 09/30/24  1510   *   K 4.0   CL 98   CO2 22*      BUN 34*   CREATININE 1.1   CALCIUM 9.6   PROT 7.4   ALBUMIN 3.8   BILITOT 0.9   ALKPHOS 87   AST 32   ALT 24   ANIONGAP 14       Significant Imaging: I have reviewed all pertinent imaging results/findings within the past 24 hours.    CTA:   Impression:     1. No evidence of an acute intracranial abnormality. Further evaluation with MRI could be performed, as clinically warranted.  2. Mild generalized cerebral volume loss and mild scattered nonspecific supratentorial white matter hypoattenuation probably reflecting sequelae of chronic small vessel ischemic change.  3. No intracranial high-grade stenosis, large vessel occlusion, aneurysm or arteriovenous malformation.  4. No hemodynamically significant stenosis, major branch occlusion, aneurysm or dissection in the neck arterial vasculature.  5. Stable beaded appearance of the right cervical internal carotid artery which may be secondary to atherosclerotic disease or fibromuscular dysplasia.  6. Enlarged, multinodular thyroid gland, better characterized on prior dedicated thyroid ultrasound.  Assessment/Plan:     * TIA (transient ischemic  attack), 11/3/2014  Left facial numbness (resolved)  Antithrombotics for secondary stroke prevention: Antiplatelets: Aspirin: 81 mg daily    Statins for secondary stroke prevention and hyperlipidemia, if present:   Statins: Atorvastatin- 40 mg daily    Aggressive risk factor modification: HTN     Rehab efforts: The patient has been evaluated by a stroke team provider and the therapy needs have been fully considered based off the presenting complaints and exam findings. The following therapy evaluations are needed: PT evaluate and treat, OT evaluate and treat, SLP evaluate and treat    Diagnostics ordered/pending: CTA Head to assess vasculature     VTE prophylaxis: Mechanical prophylaxis: Place SCDs    BP parameters: TIA: SBP <220 until imaging confirmation of no infarct         NILSA on CPAP, using 53% to 70%  Chronic problem   CPAP p.r.n.      Cardiac pacemaker in situ, [employer], dual chamber, 10/14/2021  Chronic problem   History noted      Chronic diastolic heart failure, 2014  Chronic problem   Stable   Continuous telemetry monitoring           VTE Risk Mitigation (From admission, onward)           Ordered     apixaban tablet 2.5 mg  2 times daily         09/30/24 1815     IP VTE HIGH RISK PATIENT  Once         09/30/24 1815     Place sequential compression device  Until discontinued         09/30/24 1815     Place LAMIN hose  Until discontinued         09/30/24 1815                         On 09/30/2024, patient should be placed in hospital observation services under my care in collaboration with Dr. Montenegro.      Pharmacist Renal Dose Adjustment Note    Ayla Dela Cruz is a 79 y.o. female being treated with the medication Famotidine    Patient Data:    Vital Signs (Most Recent):  Temp: 97.7 °F (36.5 °C) (09/30/24 1452)  Pulse: 69 (09/30/24 1536)  Resp: 18 (09/30/24 1452)  BP: 138/61 (09/30/24 1452)  SpO2: 100 % (09/30/24 1536) Vital Signs (72h Range):  Temp:  [97.7 °F (36.5 °C)]   Pulse:  [63-69]    Resp:  [18]   BP: (102-138)/(60-61)   SpO2:  [98 %-100 %]      Recent Labs   Lab 09/30/24  1510   CREATININE 1.1     Serum creatinine: 1.1 mg/dL 09/30/24 1510  Estimated creatinine clearance: 31.9 mL/min    Famotidine 20 mg BID will be changed to Famotidine 20 mg QD  Based on CrCl < 50    Pharmacist's Name: Rosalinda Gonzalez  Pharmacist's Extension: [phone]      Subhash Bravo NP  Department of [facility] Medicine  Formerly [facility]

## 2024-09-30 NOTE — PROGRESS NOTES
Pharmacist Renal Dose Adjustment Note    Ayla Dela Cruz is a 79 y.o. female being treated with the medication Famotidine    Patient Data:    Vital Signs (Most Recent):  Temp: 97.7 °F (36.5 °C) (09/30/24 1452)  Pulse: 69 (09/30/24 1536)  Resp: 18 (09/30/24 1452)  BP: 138/61 (09/30/24 1452)  SpO2: 100 % (09/30/24 1536) Vital Signs (72h Range):  Temp:  [97.7 °F (36.5 °C)]   Pulse:  [63-69]   Resp:  [18]   BP: (102-138)/(60-61)   SpO2:  [98 %-100 %]      Recent Labs   Lab 09/30/24  1510   CREATININE 1.1     Serum creatinine: 1.1 mg/dL 09/30/24 1510  Estimated creatinine clearance: 31.9 mL/min    Famotidine 20 mg BID will be changed to Famotidine 20 mg QD  Based on CrCl < 50    Pharmacist's Name: Rosalinda Gonzalez  Pharmacist's Extension: [phone]     5

## 2024-09-30 NOTE — SUBJECTIVE & OBJECTIVE
Past Medical History:   Diagnosis Date    Anticoagulant long-term use     Anxiety     Arthritis     Atrial fibrillation     Basal cell carcinoma     Cancer     skin    CHF (congestive heart failure)     Depression     DVT (deep venous thrombosis)     GERD (gastroesophageal reflux disease)     Glaucoma (increased eye pressure)     Hyperlipidemia     diet controlled    Hypertension     Interstitial lung disease     Localized hives 01/10/2020    Mild persistent asthma without complication 11/12/2018    Pacemaker     Pneumonia 01/31/2014    Stroke 06/03/2014    Stroke     TIA (transient ischemic attack)     TIA (transient ischemic attack)        Past Surgical History:   Procedure Laterality Date    2 heart ablations      3 in total    A-V CARDIAC PACEMAKER INSERTION Left 10/14/2021    Procedure: INSERTION, CARDIAC PACEMAKER, DUAL CHAMBER;  Surgeon: Fco Calderon MD;  Location: [facility] EP LAB;  Service: Cardiology;  Laterality: Left;  PAF/ Elrama Arrthymias, Dual PPM, SJM, MAC, GP, 3 PREP    ANTERIOR VITRECTOMY Right 7/27/2022    Procedure: VITRECTOMY, ANTERIOR APPROACH;  Surgeon: Daniel Tan MD;  Location: [facility] OR;  Service: Ophthalmology;  Laterality: Right;    bilateral cataracts      CHOLECYSTECTOMY      COLONOSCOPY N/A 1/19/2017    Procedure: COLONOSCOPY and EGD;  Surgeon: Jonathan Schultz MD;  Location: [facility];  Service: Endoscopy;  Laterality: N/A;    COLONOSCOPY N/A 10/10/2019    Procedure: COLONOSCOPY;  Surgeon: Alex Christian MD;  Location: [facility];  Service: Endoscopy;  Laterality: N/A;    ESOPHAGOGASTRODUODENOSCOPY N/A 10/14/2019    Procedure: EGD (ESOPHAGOGASTRODUODENOSCOPY);  Surgeon: Alex Christian MD;  Location: [facility];  Service: Endoscopy;  Laterality: N/A;    ESOPHAGOGASTRODUODENOSCOPY N/A 1/25/2024    Procedure: EGD (ESOPHAGOGASTRODUODENOSCOPY);  Surgeon: Alex Christian MD;  Location: [facility];  Service: Endoscopy;  Laterality: N/A;    INJECTION OF ANESTHETIC AGENT AROUND MEDIAL  BRANCH NERVES INNERVATING CERVICAL FACET JOINT Right 6/7/2018    Procedure: BLOCK, NERVE, FACET JOINT, MEDIAL BRANCH, CERVICAL;  Surgeon: Galo Clancy MD;  Location: [facility] OR;  Service: Pain Management;  Laterality: Right;  C4, 5, 6    OPEN REDUCTION AND INTERNAL FIXATION (ORIF) OF INJURY OF ANKLE Right 11/1/2018    Procedure: ORIF, ANKLE;  Surgeon: Wilfredo Aguero MD;  Location: [facility] OR;  Service: Orthopedics;  Laterality: Right;    PARACENTESIS, EYE, ANTERIOR CHAMBER, WITH AQUEOUS REMOVAL Right 7/27/2022    Procedure: Anterior chamber washout, possible IOL removal;  Surgeon: Daniel Tan MD;  Location: [facility] OR;  Service: Ophthalmology;  Laterality: Right;    pyloristenosis      RADIOFREQUENCY ABLATION OF LUMBAR MEDIAL BRANCH NERVE AT SINGLE LEVEL Bilateral 9/21/2018    Procedure: RADIOFREQUENCY ABLATION, NERVE, SPINAL, LUMBAR, MEDIAL BRANCH, 1 LEVEL;  Surgeon: Galo Clancy MD;  Location: [facility];  Service: Pain Management;  Laterality: Bilateral;  L3, 4, 5    RADIOFREQUENCY ABLATION OF LUMBAR MEDIAL BRANCH NERVE AT SINGLE LEVEL Bilateral 2/19/2019    Procedure: Radiofrequency Ablation, Nerve, Spinal, Lumbar, Medial Branch, 1 Level;  Surgeon: Galo Clancy MD;  Location: [facility] OR;  Service: Pain Management;  Laterality: Bilateral;  L3, 4, 5     RADIOFREQUENCY ABLATION OF LUMBAR MEDIAL BRANCH NERVE AT SINGLE LEVEL Bilateral 3/10/2020    Procedure: Radiofrequency Ablation, Nerve, Spinal, Lumbar, Medial Branch, 1 Level;  Surgeon: Galo Clancy MD;  Location: [facility] OR;  Service: Pain Management;  Laterality: Bilateral;  L3,4,5 - Burned at 80 degrees C. for 60 seconds x 2 each site      RADIOFREQUENCY ABLATION OF LUMBAR MEDIAL BRANCH NERVE AT SINGLE LEVEL Bilateral 9/11/2020    Procedure: Radiofrequency Ablation, Nerve, Spinal, Lumbar, Medial Branch, 1 Level;  Surgeon: Galo Clancy MD;  Location: [facility] OR;  Service: Pain Management;  Laterality: Bilateral;  L3, 4, 5 - Burned at 80 degrees C. for 60 seconds x 2 each site     RADIOFREQUENCY ABLATION OF LUMBAR MEDIAL BRANCH NERVE AT SINGLE LEVEL Bilateral 5/28/2021    Procedure: Radiofrequency Ablation, Nerve, Spinal, Lumbar, Medial Branch, 1 Level;  Surgeon: Galo Clancy MD;  Location: [facility] OR;  Service: Pain Management;  Laterality: Bilateral;  L3,4,5    RADIOFREQUENCY THERMAL COAGULATION OF MEDIAL BRANCH OF POSTERIOR RAMUS OF CERVICAL SPINAL NERVE Right 7/3/2018    Procedure: RADIOFREQUENCY THERMAL COAGULATION, NERVE, SPINAL, CERVICAL, MEDIAL BRANCH OF POSTERIOR RAMUS;  Surgeon: Galo Clancy MD;  Location: [facility] OR;  Service: Pain Management;  Laterality: Right;  C4,5,6 - Burned at 80 degrees C. for 75 seconds each site    RADIOFREQUENCY THERMAL COAGULATION OF MEDIAL BRANCH OF POSTERIOR RAMUS OF CERVICAL SPINAL NERVE Right 7/23/2019    Procedure: RADIOFREQUENCY THERMAL COAGULATION, NERVE, SPINAL, CERVICAL, POSTERIOR RAMUS, MEDIAL BRANCH;  Surgeon: Galo Clanyc MD;  Location: [facility] OR;  Service: Pain Management;  Laterality: Right;  C4,5,6    RADIOFREQUENCY THERMAL COAGULATION OF MEDIAL BRANCH OF POSTERIOR RAMUS OF CERVICAL SPINAL NERVE Right 6/23/2020    Procedure: RADIOFREQUENCY THERMAL COAGULATION, NERVE, SPINAL, CERVICAL, POSTERIOR RAMUS, MEDIAL BRANCH;  Surgeon: Galo Clancy MD;  Location: [facility] OR;  Service: Pain Management;  Laterality: Right;  C4, 5, 6    RADIOFREQUENCY THERMOCOAGULATION Bilateral 9/10/2019    Procedure: RADIOFREQUENCY THERMAL COAGULATION LUMBAR;  Surgeon: Galo Clancy MD;  Location: [facility] OR;  Service: Pain Management;  Laterality: Bilateral;  L3,4,5 - Burned at 80 degrees C. for 60 seconds x 2 each site    skin cancer removal       TONSILLECTOMY         Review of patient's allergies indicates:   Allergen Reactions    Gabapentin Hallucinations    Xarelto [rivaroxaban] Rash    Bactrim [sulfamethoxazole-trimethoprim] Other (See Comments)     Upset stomach, dry heaves, confusion    Amoxicillin-pot clavulanate      Other reaction(s): Mental Status Change    Atorvastatin       Other reaction(s): Joint pain    Baclofen     Baclofen (bulk) Nausea And Vomiting    Ciprofloxacin     Decongest tabs      Other reaction(s): increased heart rate    Decongestant [pseudoephedrine hcl]     Erythromycin Other (See Comments)    Flecainide Hives     And SOB. No reaction to Lidocaine     Fluoxetine      Other reaction(s): heart palpitations  Other reaction(s): anxiety    Lisinopril Other (See Comments)     cough    Losartan Other (See Comments)     Hypotension with lightheadedness    Morphine Other (See Comments)     confusion    Tramadol Other (See Comments)     SOB, low BP    Venlafaxine     Venlafaxine analogues      Changes in BP and increases heart rate       Caffeine Palpitations    Dabigatran etexilate Rash    Plavix [clopidogrel] Rash    Tizanidine Anxiety     dizziness       Current Facility-Administered Medications on File Prior to Encounter   Medication    bupivacaine (PF) 0.25% (2.5 mg/ml) injection    lidocaine (PF) 10 mg/ml (1%) injection    lidocaine (PF) 20 mg/ml (2%) injection    methylPREDNISolone acetate injection     Current Outpatient Medications on File Prior to Encounter   Medication Sig    apixaban (ELIQUIS) 2.5 mg Tab Take 1 tablet (2.5 mg total) by mouth 2 (two) times daily.    aspirin 81 MG Chew Take 81 mg by mouth once daily.    atropine 1% (ISOPTO ATROPINE) 1 % Drop PLACE 1 DROP INTO THE RIGHT EYE 2 TIMES A DAY. (Patient taking differently: Place 1 drop into the right eye 2 (two) times a day.)    azelastine (ASTELIN) 137 mcg (0.1 %) nasal spray 1 spray (137 mcg total) by Nasal route 2 (two) times daily.    busPIRone (BUSPAR) 10 MG tablet Take 1 tablet (10 mg total) by mouth 3 (three) times daily.    furosemide (LASIX) 20 MG tablet Take 1 tablet (20 mg total) by mouth daily as needed (Edema).    latanoprost 0.005 % ophthalmic solution Place 1 drop into both eyes once daily.    mirtazapine (REMERON) 7.5 MG Tab Take 1 tablet (7.5 mg total) by mouth every evening.     pantoprazole (PROTONIX) 40 MG tablet TAKE 1 TABLET (40 MG TOTAL) BY MOUTH ONCE DAILY.    predniSONE (DELTASONE) 20 MG tablet Take one daily for 3 days and may repeat for shortness of breath. (Patient taking differently: Take 20 mg by mouth As instructed. Take one daily for 3 days and may repeat for shortness of breath.)    propranoloL (INDERAL) 40 MG tablet TAKE 1 TABLET BY MOUTH TWICE A DAY    triamterene-hydrochlorothiazide 75-50 mg (MAXZIDE) 75-50 mg per tablet Take 1 tablet by mouth once daily.    vortioxetine (TRINTELLIX) 20 mg Tab Take 1 tablet (20 mg total) by mouth Daily.    [DISCONTINUED] fluticasone-umeclidin-vilanter (TRELEGY ELLIPTA) 200-62.5-25 mcg inhaler Inhale 1 puff into the lungs once daily.    [DISCONTINUED] LORazepam (ATIVAN) 0.5 MG tablet Take 1 tablet (0.5 mg total) by mouth daily as needed for Anxiety.    [DISCONTINUED] triamcinolone acetonide 0.1% (KENALOG) 0.1 % cream AAA back bid PRN rash     Family History       Problem Relation (Age of Onset)    Alzheimer's disease Maternal Uncle    Arthritis Mother    Asthma Mother    Cancer Maternal Grandfather, Paternal Grandmother    Depression Son    Emphysema Maternal Grandfather    Heart disease Father    Kidney disease Maternal Grandfather    Pneumonia Mother, Paternal Grandfather    Rheum arthritis Mother, Maternal Grandmother    Skin cancer Mother    Ulcers Father          Tobacco Use    Smoking status: Never    Smokeless tobacco: Never   Substance and Sexual Activity    Alcohol use: No    Drug use: No    Sexual activity: Yes     Partners: Male     Review of Systems   Constitutional:  Negative for activity change, chills, diaphoresis and fever.   HENT:  Negative for congestion, nosebleeds and tinnitus.    Eyes:  Negative for photophobia and visual disturbance.   Respiratory:  Negative for cough, chest tightness, shortness of breath and wheezing.    Cardiovascular:  Negative for chest pain, palpitations and leg swelling.   Gastrointestinal:   Negative for abdominal distention, abdominal pain, constipation, diarrhea, nausea and vomiting.   Endocrine: Negative for cold intolerance and heat intolerance.   Genitourinary:  Negative for difficulty urinating, dysuria, frequency, hematuria and urgency.   Musculoskeletal:  Negative for arthralgias, back pain and myalgias.   Skin:  Negative for pallor, rash and wound.   Allergic/Immunologic: Negative for immunocompromised state.   Neurological:  Positive for numbness. Negative for dizziness, tremors, facial asymmetry, speech difficulty and weakness.   Hematological:  Negative for adenopathy. Does not bruise/bleed easily.   Psychiatric/Behavioral:  Negative for confusion and sleep disturbance. The patient is not nervous/anxious.      Objective:     Vital Signs (Most Recent):  Temp: 97.7 °F (36.5 °C) (09/30/24 1452)  Pulse: 64 (09/30/24 1833)  Resp: 18 (09/30/24 1452)  BP: (!) 100/58 (09/30/24 1833)  SpO2: 97 % (09/30/24 1833) Vital Signs (24h Range):  Temp:  [97.7 °F (36.5 °C)] 97.7 °F (36.5 °C)  Pulse:  [63-69] 64  Resp:  [18] 18  SpO2:  [97 %-100 %] 97 %  BP: (100-138)/(58-61) 100/58     Weight: 50.3 kg (111 lb)  Body mass index is 17.39 kg/m².     Physical Exam  Vitals and nursing note reviewed.   Constitutional:       General: She is not in acute distress.     Appearance: She is well-developed. She is not diaphoretic.   HENT:      Head: Normocephalic.      Mouth/Throat:      Mouth: Mucous membranes are moist.      Pharynx: Oropharynx is clear.   Eyes:      General: No scleral icterus.     Conjunctiva/sclera: Conjunctivae normal.      Pupils: Pupils are equal, round, and reactive to light.   Neck:      Vascular: No JVD.   Cardiovascular:      Rate and Rhythm: Normal rate and regular rhythm.      Heart sounds: Normal heart sounds. No murmur heard.     No friction rub. No gallop.   Pulmonary:      Effort: Pulmonary effort is normal. No respiratory distress.      Breath sounds: Normal breath sounds. No wheezing or  rales.   Abdominal:      General: Bowel sounds are normal. There is no distension.      Palpations: Abdomen is soft.      Tenderness: There is no abdominal tenderness. There is no guarding or rebound.   Musculoskeletal:         General: No tenderness. Normal range of motion.      Cervical back: Normal range of motion and neck supple.   Lymphadenopathy:      Cervical: No cervical adenopathy.   Skin:     General: Skin is warm and dry.      Capillary Refill: Capillary refill takes less than 2 seconds.      Coloration: Skin is not pale.      Findings: No erythema or rash.   Neurological:      Mental Status: She is alert and oriented to person, place, and time.      Cranial Nerves: No cranial nerve deficit.      Sensory: No sensory deficit.      Coordination: Coordination normal.      Deep Tendon Reflexes: Reflexes normal.   Psychiatric:         Behavior: Behavior normal.         Thought Content: Thought content normal.         Judgment: Judgment normal.              CRANIAL NERVES     CN III, IV, VI   Pupils are equal, round, and reactive to light.       Significant Labs: All pertinent labs within the past 24 hours have been reviewed.  CBC:   Recent Labs   Lab 09/30/24  1510   WBC 13.01*   HGB 12.7   HCT 38.4        CMP:   Recent Labs   Lab 09/30/24  1510   *   K 4.0   CL 98   CO2 22*      BUN 34*   CREATININE 1.1   CALCIUM 9.6   PROT 7.4   ALBUMIN 3.8   BILITOT 0.9   ALKPHOS 87   AST 32   ALT 24   ANIONGAP 14       Significant Imaging: I have reviewed all pertinent imaging results/findings within the past 24 hours.    CTA:   Impression:     1. No evidence of an acute intracranial abnormality. Further evaluation with MRI could be performed, as clinically warranted.  2. Mild generalized cerebral volume loss and mild scattered nonspecific supratentorial white matter hypoattenuation probably reflecting sequelae of chronic small vessel ischemic change.  3. No intracranial high-grade stenosis, large  vessel occlusion, aneurysm or arteriovenous malformation.  4. No hemodynamically significant stenosis, major branch occlusion, aneurysm or dissection in the neck arterial vasculature.  5. Stable beaded appearance of the right cervical internal carotid artery which may be secondary to atherosclerotic disease or fibromuscular dysplasia.  6. Enlarged, multinodular thyroid gland, better characterized on prior dedicated thyroid ultrasound.

## 2024-10-01 VITALS
HEIGHT: 67 IN | DIASTOLIC BLOOD PRESSURE: 55 MMHG | BODY MASS INDEX: 17.1 KG/M2 | HEART RATE: 68 BPM | OXYGEN SATURATION: 100 % | WEIGHT: 108.94 LBS | RESPIRATION RATE: 18 BRPM | SYSTOLIC BLOOD PRESSURE: 123 MMHG | TEMPERATURE: 98 F

## 2024-10-01 LAB
ALBUMIN SERPL BCP-MCNC: 3.6 G/DL (ref 3.5–5.2)
ALP SERPL-CCNC: 78 U/L (ref 55–135)
ALT SERPL W/O P-5'-P-CCNC: 21 U/L (ref 10–44)
ANION GAP SERPL CALC-SCNC: 13 MMOL/L (ref 8–16)
APTT PPP: 30 SEC (ref 21–32)
AST SERPL-CCNC: 28 U/L (ref 10–40)
BASOPHILS # BLD AUTO: 0.06 K/UL (ref 0–0.2)
BASOPHILS NFR BLD: 0.6 % (ref 0–1.9)
BILIRUB SERPL-MCNC: 0.9 MG/DL (ref 0.1–1)
BUN SERPL-MCNC: 27 MG/DL (ref 8–23)
CALCIUM SERPL-MCNC: 9.5 MG/DL (ref 8.7–10.5)
CHLORIDE SERPL-SCNC: 101 MMOL/L (ref 95–110)
CO2 SERPL-SCNC: 26 MMOL/L (ref 23–29)
CREAT SERPL-MCNC: 1 MG/DL (ref 0.5–1.4)
DIFFERENTIAL METHOD BLD: ABNORMAL
EOSINOPHIL # BLD AUTO: 0.6 K/UL (ref 0–0.5)
EOSINOPHIL NFR BLD: 6.4 % (ref 0–8)
ERYTHROCYTE [DISTWIDTH] IN BLOOD BY AUTOMATED COUNT: 14.6 % (ref 11.5–14.5)
EST. GFR  (NO RACE VARIABLE): 57 ML/MIN/1.73 M^2
GLUCOSE SERPL-MCNC: 84 MG/DL (ref 70–110)
HCT VFR BLD AUTO: 35.6 % (ref 37–48.5)
HGB BLD-MCNC: 11.7 G/DL (ref 12–16)
IMM GRANULOCYTES # BLD AUTO: 0.04 K/UL (ref 0–0.04)
IMM GRANULOCYTES NFR BLD AUTO: 0.4 % (ref 0–0.5)
INR PPP: 1 (ref 0.8–1.2)
LYMPHOCYTES # BLD AUTO: 1.3 K/UL (ref 1–4.8)
LYMPHOCYTES NFR BLD: 13.5 % (ref 18–48)
MAGNESIUM SERPL-MCNC: 2.4 MG/DL (ref 1.6–2.6)
MCH RBC QN AUTO: 32.4 PG (ref 27–31)
MCHC RBC AUTO-ENTMCNC: 32.9 G/DL (ref 32–36)
MCV RBC AUTO: 99 FL (ref 82–98)
MONOCYTES # BLD AUTO: 1.3 K/UL (ref 0.3–1)
MONOCYTES NFR BLD: 14.4 % (ref 4–15)
NEUTROPHILS # BLD AUTO: 6 K/UL (ref 1.8–7.7)
NEUTROPHILS NFR BLD: 64.7 % (ref 38–73)
NRBC BLD-RTO: 0 /100 WBC
PHOSPHATE SERPL-MCNC: 3.3 MG/DL (ref 2.7–4.5)
PLATELET # BLD AUTO: 266 K/UL (ref 150–450)
PMV BLD AUTO: 9 FL (ref 9.2–12.9)
POTASSIUM SERPL-SCNC: 3.3 MMOL/L (ref 3.5–5.1)
PROT SERPL-MCNC: 6.8 G/DL (ref 6–8.4)
PROTHROMBIN TIME: 11.1 SEC (ref 9–12.5)
RBC # BLD AUTO: 3.61 M/UL (ref 4–5.4)
SODIUM SERPL-SCNC: 140 MMOL/L (ref 136–145)
WBC # BLD AUTO: 9.33 K/UL (ref 3.9–12.7)

## 2024-10-01 PROCEDURE — 94761 N-INVAS EAR/PLS OXIMETRY MLT: CPT

## 2024-10-01 PROCEDURE — G0378 HOSPITAL OBSERVATION PER HR: HCPCS

## 2024-10-01 PROCEDURE — 25000003 PHARM REV CODE 250: Performed by: NURSE PRACTITIONER

## 2024-10-01 PROCEDURE — 83735 ASSAY OF MAGNESIUM: CPT | Performed by: NURSE PRACTITIONER

## 2024-10-01 PROCEDURE — 25000003 PHARM REV CODE 250: Performed by: HOSPITALIST

## 2024-10-01 PROCEDURE — 97165 OT EVAL LOW COMPLEX 30 MIN: CPT

## 2024-10-01 PROCEDURE — 97116 GAIT TRAINING THERAPY: CPT

## 2024-10-01 PROCEDURE — 85730 THROMBOPLASTIN TIME PARTIAL: CPT | Performed by: NURSE PRACTITIONER

## 2024-10-01 PROCEDURE — 85610 PROTHROMBIN TIME: CPT | Performed by: NURSE PRACTITIONER

## 2024-10-01 PROCEDURE — 92610 EVALUATE SWALLOWING FUNCTION: CPT

## 2024-10-01 PROCEDURE — 97161 PT EVAL LOW COMPLEX 20 MIN: CPT

## 2024-10-01 PROCEDURE — 85025 COMPLETE CBC W/AUTO DIFF WBC: CPT | Performed by: NURSE PRACTITIONER

## 2024-10-01 PROCEDURE — 84100 ASSAY OF PHOSPHORUS: CPT | Performed by: NURSE PRACTITIONER

## 2024-10-01 PROCEDURE — 36415 COLL VENOUS BLD VENIPUNCTURE: CPT | Performed by: NURSE PRACTITIONER

## 2024-10-01 PROCEDURE — 80053 COMPREHEN METABOLIC PANEL: CPT | Performed by: NURSE PRACTITIONER

## 2024-10-01 RX ORDER — EZETIMIBE 10 MG/1
10 TABLET ORAL NIGHTLY
Qty: 90 TABLET | Refills: 3 | Status: SHIPPED | OUTPATIENT
Start: 2024-10-01 | End: 2025-10-01

## 2024-10-01 RX ORDER — EZETIMIBE 10 MG/1
10 TABLET ORAL NIGHTLY
Status: DISCONTINUED | OUTPATIENT
Start: 2024-10-01 | End: 2024-10-01 | Stop reason: HOSPADM

## 2024-10-01 RX ORDER — POTASSIUM CHLORIDE 20 MEQ/1
40 TABLET, EXTENDED RELEASE ORAL ONCE
Status: COMPLETED | OUTPATIENT
Start: 2024-10-01 | End: 2024-10-01

## 2024-10-01 RX ADMIN — BUSPIRONE HYDROCHLORIDE 10 MG: 10 TABLET ORAL at 05:10

## 2024-10-01 RX ADMIN — POTASSIUM CHLORIDE 40 MEQ: 1500 TABLET, EXTENDED RELEASE ORAL at 09:10

## 2024-10-01 RX ADMIN — FAMOTIDINE 20 MG: 20 TABLET, FILM COATED ORAL at 08:10

## 2024-10-01 RX ADMIN — LATANOPROST 1 DROP: 50 SOLUTION/ DROPS OPHTHALMIC at 09:10

## 2024-10-01 RX ADMIN — BUSPIRONE HYDROCHLORIDE 10 MG: 10 TABLET ORAL at 08:10

## 2024-10-01 RX ADMIN — PROPRANOLOL HYDROCHLORIDE 40 MG: 20 TABLET ORAL at 08:10

## 2024-10-01 RX ADMIN — APIXABAN 2.5 MG: 2.5 TABLET, FILM COATED ORAL at 08:10

## 2024-10-01 RX ADMIN — ASPIRIN 81 MG CHEWABLE TABLET 81 MG: 81 TABLET CHEWABLE at 08:10

## 2024-10-01 NOTE — PLAN OF CARE
Problem: Stroke, Ischemic (Includes Transient Ischemic Attack)  Goal: Optimal Coping  Outcome: Progressing  Goal: Optimal Cognitive Function  Outcome: Progressing  Goal: Improved Communication Skills  Outcome: Progressing  Goal: Optimal Functional Ability  Outcome: Progressing

## 2024-10-01 NOTE — PT/OT/SLP EVAL
Physical Therapy Evaluation    Patient Name:  Ayla Dela Cruz   MRN:  [id number]    Recommendations:     Discharge Recommendations: Low Intensity Therapy   Discharge Equipment Recommendations: none   Barriers to discharge: None    Assessment:     Ayla Dela Cruz is a 79 y.o. female admitted with a medical diagnosis of TIA (transient ischemic attack).  She presents with the following impairments/functional limitations: weakness, impaired endurance, gait instability, impaired cardiopulmonary response to activity .    Pt seen supine in bed and reading a book. Spouse at bedside. Pt stated L facial numbness resolved. Pt agreeable to PT and sat EOB with CGA. While standing, pt experienced a panic attack and returned to bed to calm self with breathing technique. Spouse pacifying pt. Spouse stated that pt experiencing episodes of panic/anxiety attacks and that he manages her pain meds with Xanax/apple sauce. Pt was calmer after few minutes and ambulated at hallways 250ft with CGA. Back to room  and up in chair.  LIT/Op services    Rehab Prognosis: Fair; patient would benefit from acute skilled PT services to address these deficits and reach maximum level of function.    Recent Surgery: * No surgery found *      Plan:     During this hospitalization, patient to be seen daily to address the identified rehab impairments via gait training, therapeutic activities, therapeutic exercises, neuromuscular re-education and progress toward the following goals:    Plan of Care Expires:  10/15/24    Subjective   Spouse stated that pt gets anxious and maybe that was the reason for this admit  Pt stated that she gets dry mouth- pt with frequent sip from water bottle  Chief Complaint: I'm having a panic attack  Patient/Family Comments/goals: none  Pain/Comfort:  Pain Rating 1: 0/10    Patients cultural, spiritual, Gnosticist conflicts given the current situation:      Living Environment:  Home with spouse  Prior to admission,  patients level of function was ambulatory/indep.  Equipment used at home: none.  DME owned (not currently used): none.  Upon discharge, patient will have assistance from family.    Objective:     Communicated with nurse May prior to session.  Patient found HOB elevated with telemetry  upon PT entry to room.    General Precautions: Standard, fall  Orthopedic Precautions:N/A   Braces: N/A  Respiratory Status: Room air    Exams:  Postural Exam:  Patient presented with the following abnormalities:    -       Rounded shoulders  -       Forward head  -       BMI 17.06  RLE ROM: WFL  RLE Strength: WFL  LLE ROM: WFL  LLE Strength: WFL    Functional Mobility:  Bed Mobility:     Rolling Left:  modified independence  Scooting: modified independence  Supine to Sit: modified independence  Transfers:     Sit to Stand:  contact guard assistance with no AD  Bed to Chair: contact guard assistance with  no AD  using  Stand Pivot  Gait: 250ft with HHA      AM-PAC 6 CLICK MOBILITY  Total Score:19       Treatment & Education:  Patient was educated on the importance of OOB activity and functional mobility to negate negative effects of prolonged bed rest during hospitalization, safe transfers and ambulation, and D/C planning   OOb chair post PT with all needs within reach    Patient left up in chair with all lines intact, call button in reach, chair alarm on, and spouse present.    GOALS:   Multidisciplinary Problems       Physical Therapy Goals          Problem: Physical Therapy    Goal Priority Disciplines Outcome Interventions   Physical Therapy Goal     PT, PT/OT Progressing    Description: Goals to be met by: 10-     Patient will increase functional independence with mobility by performin. Supine to sit with Modified [place]  2. Sit to stand transfer with Supervision  3. Bed to chair transfer with Supervision using No Assistive Device  4. Gait  x 250x2 feet with Contact Guard Assistance using No Assistive Device.    5. Lower extremity exercise program x20 reps                       History:     Past Medical History:   Diagnosis Date    Anticoagulant long-term use     Anxiety     Arthritis     Atrial fibrillation     Basal cell carcinoma     Cancer     skin    CHF (congestive heart failure)     Depression     DVT (deep venous thrombosis)     GERD (gastroesophageal reflux disease)     Glaucoma (increased eye pressure)     Hyperlipidemia     diet controlled    Hypertension     Interstitial lung disease     Localized hives 01/10/2020    Mild persistent asthma without complication 11/12/2018    Pacemaker     Pneumonia 01/31/2014    Stroke 06/03/2014    Stroke     TIA (transient ischemic attack)     TIA (transient ischemic attack)        Past Surgical History:   Procedure Laterality Date    2 heart ablations      3 in total    A-V CARDIAC PACEMAKER INSERTION Left 10/14/2021    Procedure: INSERTION, CARDIAC PACEMAKER, DUAL CHAMBER;  Surgeon: Fco Calderon MD;  Location: [facility] EP LAB;  Service: Cardiology;  Laterality: Left;  PAF/ [place] Arrthymias, Dual PPM, SJM, MAC, GP, 3 PREP    ANTERIOR VITRECTOMY Right 7/27/2022    Procedure: VITRECTOMY, ANTERIOR APPROACH;  Surgeon: Daniel Tan MD;  Location: [facility] OR;  Service: Ophthalmology;  Laterality: Right;    bilateral cataracts      CHOLECYSTECTOMY      COLONOSCOPY N/A 1/19/2017    Procedure: COLONOSCOPY and EGD;  Surgeon: Jonathan Schultz MD;  Location: [facility];  Service: Endoscopy;  Laterality: N/A;    COLONOSCOPY N/A 10/10/2019    Procedure: COLONOSCOPY;  Surgeon: Alex Christian MD;  Location: [facility];  Service: Endoscopy;  Laterality: N/A;    ESOPHAGOGASTRODUODENOSCOPY N/A 10/14/2019    Procedure: EGD (ESOPHAGOGASTRODUODENOSCOPY);  Surgeon: Alex Christian MD;  Location: [facility];  Service: Endoscopy;  Laterality: N/A;    ESOPHAGOGASTRODUODENOSCOPY N/A 1/25/2024    Procedure: EGD (ESOPHAGOGASTRODUODENOSCOPY);  Surgeon: Alex Christian MD;  Location: [facility];   Service: Endoscopy;  Laterality: N/A;    INJECTION OF ANESTHETIC AGENT AROUND MEDIAL BRANCH NERVES INNERVATING CERVICAL FACET JOINT Right 6/7/2018    Procedure: BLOCK, NERVE, FACET JOINT, MEDIAL BRANCH, CERVICAL;  Surgeon: Galo Clancy MD;  Location: [facility];  Service: Pain Management;  Laterality: Right;  C4, 5, 6    OPEN REDUCTION AND INTERNAL FIXATION (ORIF) OF INJURY OF ANKLE Right 11/1/2018    Procedure: ORIF, ANKLE;  Surgeon: Wilfredo Aguero MD;  Location: [facility] OR;  Service: Orthopedics;  Laterality: Right;    PARACENTESIS, EYE, ANTERIOR CHAMBER, WITH AQUEOUS REMOVAL Right 7/27/2022    Procedure: Anterior chamber washout, possible IOL removal;  Surgeon: Daniel Tan MD;  Location: [facility] OR;  Service: Ophthalmology;  Laterality: Right;    pyloristenosis      RADIOFREQUENCY ABLATION OF LUMBAR MEDIAL BRANCH NERVE AT SINGLE LEVEL Bilateral 9/21/2018    Procedure: RADIOFREQUENCY ABLATION, NERVE, SPINAL, LUMBAR, MEDIAL BRANCH, 1 LEVEL;  Surgeon: Galo Clancy MD;  Location: [facility];  Service: Pain Management;  Laterality: Bilateral;  L3, 4, 5    RADIOFREQUENCY ABLATION OF LUMBAR MEDIAL BRANCH NERVE AT SINGLE LEVEL Bilateral 2/19/2019    Procedure: Radiofrequency Ablation, Nerve, Spinal, Lumbar, Medial Branch, 1 Level;  Surgeon: Galo Clancy MD;  Location: [facility] OR;  Service: Pain Management;  Laterality: Bilateral;  L3, 4, 5     RADIOFREQUENCY ABLATION OF LUMBAR MEDIAL BRANCH NERVE AT SINGLE LEVEL Bilateral 3/10/2020    Procedure: Radiofrequency Ablation, Nerve, Spinal, Lumbar, Medial Branch, 1 Level;  Surgeon: Galo Clancy MD;  Location: [facility] OR;  Service: Pain Management;  Laterality: Bilateral;  L3,4,5 - Burned at 80 degrees C. for 60 seconds x 2 each site      RADIOFREQUENCY ABLATION OF LUMBAR MEDIAL BRANCH NERVE AT SINGLE LEVEL Bilateral 9/11/2020    Procedure: Radiofrequency Ablation, Nerve, Spinal, Lumbar, Medial Branch, 1 Level;  Surgeon: Galo Clancy MD;  Location: [facility];  Service: Pain Management;   Laterality: Bilateral;  L3, 4, 5 - Burned at 80 degrees C. for 60 seconds x 2 each site    RADIOFREQUENCY ABLATION OF LUMBAR MEDIAL BRANCH NERVE AT SINGLE LEVEL Bilateral 5/28/2021    Procedure: Radiofrequency Ablation, Nerve, Spinal, Lumbar, Medial Branch, 1 Level;  Surgeon: Galo Clancy MD;  Location: [facility] OR;  Service: Pain Management;  Laterality: Bilateral;  L3,4,5    RADIOFREQUENCY THERMAL COAGULATION OF MEDIAL BRANCH OF POSTERIOR RAMUS OF CERVICAL SPINAL NERVE Right 7/3/2018    Procedure: RADIOFREQUENCY THERMAL COAGULATION, NERVE, SPINAL, CERVICAL, MEDIAL BRANCH OF POSTERIOR RAMUS;  Surgeon: Galo Clancy MD;  Location: [facility] OR;  Service: Pain Management;  Laterality: Right;  C4,5,6 - Burned at 80 degrees C. for 75 seconds each site    RADIOFREQUENCY THERMAL COAGULATION OF MEDIAL BRANCH OF POSTERIOR RAMUS OF CERVICAL SPINAL NERVE Right 7/23/2019    Procedure: RADIOFREQUENCY THERMAL COAGULATION, NERVE, SPINAL, CERVICAL, POSTERIOR RAMUS, MEDIAL BRANCH;  Surgeon: Galo Clancy MD;  Location: [facility] OR;  Service: Pain Management;  Laterality: Right;  C4,5,6    RADIOFREQUENCY THERMAL COAGULATION OF MEDIAL BRANCH OF POSTERIOR RAMUS OF CERVICAL SPINAL NERVE Right 6/23/2020    Procedure: RADIOFREQUENCY THERMAL COAGULATION, NERVE, SPINAL, CERVICAL, POSTERIOR RAMUS, MEDIAL BRANCH;  Surgeon: Galo Clancy MD;  Location: [facility] OR;  Service: Pain Management;  Laterality: Right;  C4, 5, 6    RADIOFREQUENCY THERMOCOAGULATION Bilateral 9/10/2019    Procedure: RADIOFREQUENCY THERMAL COAGULATION LUMBAR;  Surgeon: Galo Clancy MD;  Location: [facility] OR;  Service: Pain Management;  Laterality: Bilateral;  L3,4,5 - Burned at 80 degrees C. for 60 seconds x 2 each site    skin cancer removal       TONSILLECTOMY         Time Tracking:     PT Received On: 10/01/24  PT Start Time: 1108     PT Stop Time: 1127  PT Total Time (min): 19 min     Billable Minutes: Evaluation 10 and Gait Training 9      10/01/2024

## 2024-10-01 NOTE — PLAN OF CARE
[facility] - Med/Surg  Initial Discharge Assessment       Primary Care Provider: Jan Olivo MD    Admission Diagnosis: TIA (transient ischemic attack) [G45.9]    Admission Date: 9/30/2024  Expected Discharge Date: 10/2/2024    Transition of Care Barriers: None    Payor: [employer] MEDICARE ADVANTAGE / Plan: [employer] MCARE ADV / Product Type: Medicare Advantage /     Extended Emergency Contact Information  Primary Emergency Contact: Jan Dela Cruz  Address: [address] [place] [address] [place]  Mobile Phone: [phone]  Relation: Spouse  Preferred language: English   needed? No  Secondary Emergency Contact: Lyric Olguin  Address: [address] [place] [address] [facility]  Home Phone: [phone]  Mobile Phone: [phone]  Relation: Daughter  Preferred language: English   needed? No    Discharge Plan A: Home with family  Discharge Plan B: Home      [id number] [place], [place]  [address]  [address] [address]  Phone: [phone] Fax: [phone]    DC assessment completed at bedside with pt and spouse, Jan. Information on facesheet verified. Lives at listed address with spouse who will drive her home. PCP is Dr. Olivo. Pharm [facility]. Insurance verified. POA is spouse. Denies recent admission. Independent with ADLs. Denies recent admission. Planning to DC home when clear.     Initial Assessment (most recent)       Adult Discharge Assessment - 10/01/24 1500          Discharge Assessment    Assessment Type Discharge Planning Assessment     Confirmed/corrected address, phone number and insurance Yes     Confirmed Demographics Correct on Facesheet     Source of Information patient;family     Does patient/caregiver understand observation status Yes     Communicated ERWIN with patient/caregiver Yes     People in Home spouse     Facility Arrived From: home     Do you  expect to return to your current living situation? Yes     Do you have help at home or someone to help you manage your care at home? Yes     Who are your caregiver(s) and their phone number(s)? spouse     Prior to hospitilization cognitive status: Unable to Assess     Current cognitive status: Alert/Oriented     Equipment Currently Used at Home walker, rolling;bedside commode;CPAP     Readmission within 30 days? No     Patient currently being followed by outpatient case management? No     Do you currently have service(s) that help you manage your care at home? No     Do you take prescription medications? Yes     Do you have prescription coverage? Yes     Do you have any problems affording any of your prescribed medications? No     Is the patient taking medications as prescribed? yes     Who is going to help you get home at discharge? spouse     How do you get to doctors appointments? car, drives self;family or friend will provide     Are you on dialysis? No     Do you take coumadin? No     Discharge Plan A Home with family     Discharge Plan B Home     DME Needed Upon Discharge  none     Discharge Plan discussed with: Patient;Spouse/sig other     Name(s) and Number(s) Jan @ [facility]     Transition of Care Barriers None

## 2024-10-01 NOTE — PLAN OF CARE
Problem: SLP  Goal: SLP Goal  Description: 1.The patient will consume a minced and moist diet with thin liquids with improved efficiency of mastication and transit per patient report.  2. The patient will recall strategy of adding moisture (gravies, sauces, apple sauce) to regular foods/meat independently to improve oral efficiency and tolerance of diet.  Outcome: Progressing   Ayla Dela Cruz presents with swallowing that is physiologically WFL but presents with chronic aversion/fear of swallowing that is chronic and has been addressed in out patient dysphagia therapy this year. The patient feels she is managing well enough although reports swallowing is not efficient and foods she is comfortable with are limited.  Swallowing deficits may also be attributed to a dry mouth which prolongs mastication. Patient educated on adding moisture to all foods to improve efficiency.

## 2024-10-01 NOTE — PT/OT/SLP EVAL
Speech Language Pathology  Cognitive , Swallowing, and Voice Evaluation     Patient Name:  Ayla Dela Cruz   MRN:  [id number]  Admitting Diagnosis: TIA (transient ischemic attack) [G45.9]  Final Diagnosis:  Final diagnoses:  [R29.818] Acute focal neurological deficit  [R20.0] Numbness (Primary)  [G45.9] TIA (transient ischemic attack)  Current length of stay: 0   Expected Discharge:  10/1/2024 .  Admitting Diet: Cardiac Regular       Recommendations:     General Recommendations: Follow for diet tolerance with upgrade/downgrade as needed  Diet recommendations:  Solid Diet Level: Minced & Moist Diet - IDDSI Level 5  Aspiration Precautions:   Crushed in puree  Add Extra moisture to meals  General Precautions: General Precautions: fall   Communication strategies:  none    Assessment:     Ayla Dela Cruz is a 79 y.o. female with a chief complaint of Numbness (To left side of face beginning 45 minutes PTA) Speech Therapy consult received on on: 9/30/2024  for evaluation of Cognitive , Swallowing, and Voice due to TIA. Active problem list relevant to Speech Therapy evaluation includes new this admit   TIA  and dysphagia (2024), GERD (2019), and Glaucoma 2022, cognitive impairment 2022. Dehydration 2022, malnutrition 2021, cva 2014 .      SPEECH THERAPY ASSESSMENT: Ayla Dela Cruz presents with swallowing that is physiologically WFL but presents with chronic aversion/fear of swallowing that is chronic and has been addressed in out patient dysphagia therapy this year. The patient feels she is managing well enough although reports swallowing is not efficient and foods she is comfortable with are limited.  Swallowing deficits may also be attributed to a dry mouth. Patient educated on adding moisture to all foods to improve efficiency.    Recommend Minced and Moist, IDDSI LEVEL 5 textures with thin liquids (IDDSI 0). Medications crushed in puree.  Diet recs communicated to patient's nurse and ordering  physician via secure chat. Speech Therapy to follow patient for  diet tolerance . Recommend no therapy indicated. therapy at discharge.  History of Present Illness Relevant to Speech Therapy Evaluation   HPI:  Ayla Dela Cruz is an 79-year-old female who presents emergency room for evaluation of left face numbness which onset approximately 45 minutes prior to arrival to the emergency room. She denies any focal weakness, dysarthria, or aphasia. No facial asymmetry. She reports a history of a TIA in the past (on Eliquis). CTA of the brain was negative for any acute findings.  :         INITIAL VALUES MOST RECENT      Room air, O2 Sats: 98 % Room air, O2 Sats: 100 %   97.7 °F (36.5 °C) 97.6 °F (36.4 °C)    Pulse: 63 Pulse: 60   Respiratory Rate:   Respiratory Rate:16    WBC (!) 13.01   WBC 9.33    Hgb: 12.7 Hgb: (!) 11.7   Albumin 3.8 Albumin 3.6   Creatinine: 1.1 Creatinine: 1.0     (3.9-12.7 K/uL)   (14.0 - 18.0 g/dL)   (3.4-5.2g/dL)  (.50-1.35)    IMAGING:   CT Brain (head and neck):1. No evidence of an acute intracranial abnormality.     Temp Readings from Last 3 Encounters:   10/01/24 97.6 °F (36.4 °C) (Oral)   08/21/24 97.7 °F (36.5 °C) (Oral)   07/12/24 97.5 °F (36.4 °C) (Oral)     Resp Readings from Last 3 Encounters:   10/01/24 16   09/30/24 18   03/27/24 16     Pulse Readings from Last 3 Encounters:   10/01/24 60   08/21/24 78   07/12/24 83     MODIFIED BARIUM SWALLOW STUDY:   Fiberoptic Endoscopic Evaluation of the Swallow (FEES), [facility]     EGD: Last EGD completed on 1/25/2024  plus 2019 and 2017    PRIOR SPEECH THERAPY:    Discharge note 1/31/24  Patient was seen for Fiberoptic Endoscopic Evaluation of the Swallow (FEES) and had an EGD completed with dilation. Swallow is normal, but restricted due to fear. Swallowing strategies introduced and trained. Patient now independently with these strategies and the anxiety needs to be managed by psychiatry.  Patient is appropriate for discharge this date and patient  "and  in agreement. Referral to nutrition required.  Pt states she was given an anti-anxiety medication and she was able to swallow. Pt has developed fear of choking from watching her grandmother choke when she was in her early 40s and she had to do the heimlich maneuver to save her grandmother."  Patient presents with a possibly inefficient swallow as indicated by prolonged mastication, trace oral residue, and reported globus sensation after PO trial administration. Contributing risk factors for dysphagia include complex medical history and patient's self-reported swallow anxiety. Patient with increased risk for silent aspiration given potential sensory deficits related to stroke.   PRIOR DIET:  Food:  Soft  patient avoids many foods    SOCIAL HISTORY/HOBBIES: Ms. Dela Cruz is  and lives in [place] [address]. She enjoys reading.  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       SUBJECTIVE:   No family and Husbandpresent at the bedside. Upon entering the room Ms. Dela Cruz was reclined in bed but able to sit independently .  She was pleasant and cooperative.     "I don't want to do anymore for my swallowing, I am managing well enough"    Objective:   Cognitive screen: Cognition at baseline per patient/  report and observation. No additional Speech Therapy indicated for speech or cognitive communication.    Oral Musculature Evaluation  Oral Musculature Evaluation  Oral Musculature: WFL  Dentition: present and adequate  Secretion Management: adequate  Mucosal Quality: dry  Mandibular Strength and Mobility: WFL  Oral Labial Strength and Mobility: WFL  Lingual Strength and Mobility: WFL  Volitional Swallow: good and quick rise and tilt    Bedside Swallow Eval:   Consistencies Assessed:  Thin liquids .  Solids .      Oral Phase:   Prolonged mastication  Oral residue  Slow oral transit time    Pharyngeal Phase:   no " overt clinical signs/symptoms of aspiration  no overt clinical signs/symptoms of pharyngeal dysphagia    Compensatory Strategies  Adding moisture to help clear residue    Treatment: role of SLP results of evaluation including deficits of dysphagia the patient and family was receptive to the information          Goals:   Multidisciplinary Problems       SLP Goals          Problem: SLP    Goal Priority Disciplines Outcome   SLP Goal     SLP Progressing   Description: 1.The patient will consume a minced and moist diet with thin liquids with improved efficiency of mastication and transit per patient report.  2. The patient will recall strategy of adding moisture (gravies, sauces, apple sauce) to regular foods/meat independently to improve oral efficiency and tolerance of diet.                       Plan:     Patient to be seen: 1 x/week   Plan of Care expires:     Plan of Care reviewed with:  patient, spouse  SLP Follow-Up:  Yes      Discharge recommendations:  Therapy Intensity Recommendations at Discharge: No Therapy Indicated     Barriers to Discharge:  None    Time Tracking:     SLP Treatment Date: 10/01/24  Speech Start Time:  1438  Speech Stop Time:   1452  Speech Total Time (min):  Speech Total (min): 14 min    Billable Minutes: Eval Swallow and Oral Function 14    10/01/2024

## 2024-10-01 NOTE — PLAN OF CARE
10/01/24 1505   BLACKBURN Message   Medicare Outpatient and Observation Notification regarding financial responsibility Given to patient/caregiver;Explained to patient/caregiver   Date BLACKBURN was signed 10/01/24   Time BLACKBURN was signed [id number]

## 2024-10-01 NOTE — PLAN OF CARE
Problem: Physical Therapy  Goal: Physical Therapy Goal  Description: Goals to be met by: 10-     Patient will increase functional independence with mobility by performin. Supine to sit with Modified [place]  2. Sit to stand transfer with Supervision  3. Bed to chair transfer with Supervision using No Assistive Device  4. Gait  x 250x2 feet with Contact Guard Assistance using No Assistive Device.   5. Lower extremity exercise program x20 reps  Outcome: Progressing   PT eval and treat. Gait 250ft with CGA. OOB chair. LIT. Pt had 1 panic attack/anxious   Pt admitted with pneumonia and has malnutrition documented per Dietician on 8/3. Please further specify type of malnutrition. ? Moderate chronic malnutrition ? Severe chronic mulnutrition ? Mild chronic malnutrition ? Other (please specify) ? Unable to determine The medical record reflects the following: 
  Risk Factors: 78 yo female with history of CVA with hemiplegia Clinical Indicators: 8/3 per Dietician note  Moderate malnutrition, In context of chronic illness 
 
=> Weight loss > 20 % in 1 year 
=> Mild loss of subcutaneous fat Treatment: Dietician consult, Ensure Enliven, trial Magic cup, monitor labs, weight, document all po intake Thank you for your time, 
Lizette Hernandez RN [facility] [phone]

## 2024-10-01 NOTE — PT/OT/SLP EVAL
Occupational Therapy   Evaluation and Discharge Note    Name: Ayla Dela Cruz  MRN: [id number]  Admitting Diagnosis: TIA (transient ischemic attack)  Recent Surgery: * No surgery found *      Recommendations:     Discharge Recommendations: No Therapy Indicated  Discharge Equipment Recommendations: none  Barriers to discharge:  None    Assessment:     Ayla Dela Cruz is a 79 y.o. female with a medical diagnosis of TIA (transient ischemic attack). At this time, patient is functioning at their prior level of function and does not require further acute OT services. Patient reported all sx's have resolved.     Plan:     During this hospitalization, patient does not require further acute OT services.  Please re-consult if situation changes.    Plan of Care Reviewed with: patient    Subjective     Chief Complaint: none  Patient/Family Comments/goals: none    Occupational Profile:  Living Environment: Patient lives with  in a [facility].   Previous level of function: Patient was independent with ADLs and mobility.   Equipment Used at home: walker, rolling  Assistance upon Discharge: Patient will receive assistance from  if needed.     Pain/Comfort:  Pain Rating 1: 0/10  Pain Rating Post-Intervention 1: 0/10    Patients cultural, spiritual, Moravian conflicts given the current situation:      Objective:     Communicated with: nurse Rossi/Akanksha prior to session.  Patient found HOB elevated with   upon OT entry to room.    General Precautions: Standard, fall  Orthopedic Precautions: N/A  Braces: N/A  Respiratory Status: Room air     Occupational Performance:    Bed Mobility:    Patient completed Scooting/Bridging with independence  Patient completed Supine to Sit with independence  Patient completed Sit to Supine with independence    Functional Mobility/Transfers:  Patient completed Sit <> Stand Transfer with independence  with  no assistive device   Patient completed Toilet Transfer Stand Pivot technique  with independence with  no AD    Activities of Daily Living:  Grooming: independence with oral and facial hygiene while standing at sink  Lower Body Dressing: independence to don/doff socks seated EOB  Toileting: independence     Cognitive/Visual Perceptual:  Cognitive/Psychosocial Skills:     -       Oriented to: x4   -       Follows Commands/attention:Follows multistep  commands  -       Communication: clear/fluent  -       Safety awareness/insight to disability: intact   -       Mood/Affect/Coping skills/emotional control: Appropriate to situation and Cooperative  Visual/Perceptual:      -Intact     Physical Exam:  Postural examination/scapula alignment:    -       Rounded shoulders  -       Forward head  Upper Extremity Range of Motion:     -       Right Upper Extremity: WFL  -       Left Upper Extremity: WFL  Upper Extremity Strength:    -       Right Upper Extremity: WFL  -       Left Upper Extremity: WFL   Strength:    -       Right Upper Extremity: WFL  -       Left Upper Extremity: WFL  Fine Motor Coordination:    -       Intact  Gross motor coordination:   WFL    AMPAC 6 Click ADL:  AMPAC Total Score: 24    Treatment & Education:  Pt was educated on the role of occupational therapy and the importance of safely and independently completing ADLs and functional mobility.      Patient left HOB elevated with all lines intact, call button in reach, and  present    GOALS:   Multidisciplinary Problems       Occupational Therapy Goals       Not on file                    History:     Past Medical History:   Diagnosis Date    Anticoagulant long-term use     Anxiety     Arthritis     Atrial fibrillation     Basal cell carcinoma     Cancer     skin    CHF (congestive heart failure)     Depression     DVT (deep venous thrombosis)     GERD (gastroesophageal reflux disease)     Glaucoma (increased eye pressure)     Hyperlipidemia     diet controlled    Hypertension     Interstitial lung disease     Localized  hives 01/10/2020    Mild persistent asthma without complication 11/12/2018    Pacemaker     Pneumonia 01/31/2014    Stroke 06/03/2014    Stroke     TIA (transient ischemic attack)     TIA (transient ischemic attack)          Past Surgical History:   Procedure Laterality Date    2 heart ablations      3 in total    A-V CARDIAC PACEMAKER INSERTION Left 10/14/2021    Procedure: INSERTION, CARDIAC PACEMAKER, DUAL CHAMBER;  Surgeon: Fco Calderon MD;  Location: [facility];  Service: Cardiology;  Laterality: Left;  PAF/ [place] Arrthymias, Dual PPM, SJM, MAC, GP, 3 PREP    ANTERIOR VITRECTOMY Right 7/27/2022    Procedure: VITRECTOMY, ANTERIOR APPROACH;  Surgeon: Daniel Tan MD;  Location: [facility] OR;  Service: Ophthalmology;  Laterality: Right;    bilateral cataracts      CHOLECYSTECTOMY      COLONOSCOPY N/A 1/19/2017    Procedure: COLONOSCOPY and EGD;  Surgeon: Jonathan Schultz MD;  Location: [facility];  Service: Endoscopy;  Laterality: N/A;    COLONOSCOPY N/A 10/10/2019    Procedure: COLONOSCOPY;  Surgeon: Alex Christian MD;  Location: [facility];  Service: Endoscopy;  Laterality: N/A;    ESOPHAGOGASTRODUODENOSCOPY N/A 10/14/2019    Procedure: EGD (ESOPHAGOGASTRODUODENOSCOPY);  Surgeon: Alex Christian MD;  Location: [facility];  Service: Endoscopy;  Laterality: N/A;    ESOPHAGOGASTRODUODENOSCOPY N/A 1/25/2024    Procedure: EGD (ESOPHAGOGASTRODUODENOSCOPY);  Surgeon: Alex Christian MD;  Location: [facility];  Service: Endoscopy;  Laterality: N/A;    INJECTION OF ANESTHETIC AGENT AROUND MEDIAL BRANCH NERVES INNERVATING CERVICAL FACET JOINT Right 6/7/2018    Procedure: BLOCK, NERVE, FACET JOINT, MEDIAL BRANCH, CERVICAL;  Surgeon: Galo Clancy MD;  Location: [facility] OR;  Service: Pain Management;  Laterality: Right;  C4, 5, 6    OPEN REDUCTION AND INTERNAL FIXATION (ORIF) OF INJURY OF ANKLE Right 11/1/2018    Procedure: ORIF, ANKLE;  Surgeon: Wilfredo Aguero MD;  [facility]: [facility];  Service: Orthopedics;  Laterality:  Right;    PARACENTESIS, EYE, ANTERIOR CHAMBER, WITH AQUEOUS REMOVAL Right 7/27/2022    Procedure: Anterior chamber washout, possible IOL removal;  Surgeon: Daniel Tan MD;  Location: [facility] OR;  Service: Ophthalmology;  Laterality: Right;    pyloristenosis      RADIOFREQUENCY ABLATION OF LUMBAR MEDIAL BRANCH NERVE AT SINGLE LEVEL Bilateral 9/21/2018    Procedure: RADIOFREQUENCY ABLATION, NERVE, SPINAL, LUMBAR, MEDIAL BRANCH, 1 LEVEL;  Surgeon: Galo Clancy MD;  Location: [facility] OR;  Service: Pain Management;  Laterality: Bilateral;  L3, 4, 5    RADIOFREQUENCY ABLATION OF LUMBAR MEDIAL BRANCH NERVE AT SINGLE LEVEL Bilateral 2/19/2019    Procedure: Radiofrequency Ablation, Nerve, Spinal, Lumbar, Medial Branch, 1 Level;  Surgeon: Galo Clancy MD;  Location: [facility] OR;  Service: Pain Management;  Laterality: Bilateral;  L3, 4, 5     RADIOFREQUENCY ABLATION OF LUMBAR MEDIAL BRANCH NERVE AT SINGLE LEVEL Bilateral 3/10/2020    Procedure: Radiofrequency Ablation, Nerve, Spinal, Lumbar, Medial Branch, 1 Level;  Surgeon: Galo Clancy MD;  Location: [facility] OR;  Service: Pain Management;  Laterality: Bilateral;  L3,4,5 - Burned at 80 degrees C. for 60 seconds x 2 each site      RADIOFREQUENCY ABLATION OF LUMBAR MEDIAL BRANCH NERVE AT SINGLE LEVEL Bilateral 9/11/2020    Procedure: Radiofrequency Ablation, Nerve, Spinal, Lumbar, Medial Branch, 1 Level;  Surgeon: Galo Clancy MD;  Location: [facility] OR;  Service: Pain Management;  Laterality: Bilateral;  L3, 4, 5 - Burned at 80 degrees C. for 60 seconds x 2 each site    RADIOFREQUENCY ABLATION OF LUMBAR MEDIAL BRANCH NERVE AT SINGLE LEVEL Bilateral 5/28/2021    Procedure: Radiofrequency Ablation, Nerve, Spinal, Lumbar, Medial Branch, 1 Level;  Surgeon: Galo Clancy MD;  Location: [facility] OR;  Service: Pain Management;  Laterality: Bilateral;  L3,4,5    RADIOFREQUENCY THERMAL COAGULATION OF MEDIAL BRANCH OF POSTERIOR RAMUS OF CERVICAL SPINAL NERVE Right 7/3/2018    Procedure: RADIOFREQUENCY  THERMAL COAGULATION, NERVE, SPINAL, CERVICAL, MEDIAL BRANCH OF POSTERIOR RAMUS;  Surgeon: Galo Clancy MD;  Location: [facility] OR;  Service: Pain Management;  Laterality: Right;  C4,5,6 - Burned at 80 degrees C. for 75 seconds each site    RADIOFREQUENCY THERMAL COAGULATION OF MEDIAL BRANCH OF POSTERIOR RAMUS OF CERVICAL SPINAL NERVE Right 7/23/2019    Procedure: RADIOFREQUENCY THERMAL COAGULATION, NERVE, SPINAL, CERVICAL, POSTERIOR RAMUS, MEDIAL BRANCH;  Surgeon: Galo Clancy MD;  Location: [facility] OR;  Service: Pain Management;  Laterality: Right;  C4,5,6    RADIOFREQUENCY THERMAL COAGULATION OF MEDIAL BRANCH OF POSTERIOR RAMUS OF CERVICAL SPINAL NERVE Right 6/23/2020    Procedure: RADIOFREQUENCY THERMAL COAGULATION, NERVE, SPINAL, CERVICAL, POSTERIOR RAMUS, MEDIAL BRANCH;  Surgeon: Galo Clancy MD;  Location: [facility] OR;  Service: Pain Management;  Laterality: Right;  C4, 5, 6    RADIOFREQUENCY THERMOCOAGULATION Bilateral 9/10/2019    Procedure: RADIOFREQUENCY THERMAL COAGULATION LUMBAR;  Surgeon: Galo Clancy MD;  Location: [facility] OR;  Service: Pain Management;  Laterality: Bilateral;  L3,4,5 - Burned at 80 degrees C. for 60 seconds x 2 each site    skin cancer removal       TONSILLECTOMY         Time Tracking:     OT Date of Treatment: 10/01/24  OT Start Time: 1020  OT Stop Time: 1028  OT Total Time (min): 8 min    Billable Minutes:Evaluation 8    10/1/2024

## 2024-10-01 NOTE — CONSULTS
Food & Nutrition  Education    Diet Education: Cardiac diet   Learners:patient       Nutrition Education provided with handouts:   1.) Heart Healthy     Comments:  78 y/o female admits with   1. Numbness    2. Acute focal neurological deficit    3. TIA (transient ischemic attack)      Consult received for stroke pathway and diet education. RD covering remotely today. Called patient's personal phone listed in chart. No answer. Left voicemail providing brief diet education and RD contact number for questions. Attached nutrition handout to chart for after discharge. Will f/u as needed.

## 2024-10-02 ENCOUNTER — CLINICAL SUPPORT (OUTPATIENT)
Dept: CARDIOLOGY | Facility: HOSPITAL | Age: 79
End: 2024-10-02
Payer: MEDICARE

## 2024-10-02 ENCOUNTER — CLINICAL SUPPORT (OUTPATIENT)
Dept: CARDIOLOGY | Facility: HOSPITAL | Age: 79
End: 2024-10-02
Attending: INTERNAL MEDICINE
Payer: MEDICARE

## 2024-10-02 DIAGNOSIS — Z95.0 PRESENCE OF CARDIAC PACEMAKER: ICD-10-CM

## 2024-10-02 DIAGNOSIS — I48.91 UNSPECIFIED ATRIAL FIBRILLATION: ICD-10-CM

## 2024-10-02 PROCEDURE — 93294 REM INTERROG EVL PM/LDLS PM: CPT | Mod: ,,, | Performed by: INTERNAL MEDICINE

## 2024-10-02 PROCEDURE — 93296 REM INTERROG EVL PM/IDS: CPT | Performed by: INTERNAL MEDICINE

## 2024-10-02 NOTE — PLAN OF CARE
Pt discharged after hours       10/02/24 [id number]   Final Note   Assessment Type Final Discharge Note   Anticipated Discharge Disposition Home

## 2024-10-02 NOTE — HOSPITAL COURSE
Patient admitted for further management, left facial numbness has resolved since admission, CTA of head and neck negative for large vessel occlusion, CT head is negative for acute changes.  Repeat CT head  24 hours later negative for acute changes.  Patient is on aspirin and low-dose Eliquis for stroke prophylaxis for Afib.  Evaluated by neurologist, most likely patient has TIA versus acute CVA.  Unable to do MRI since patient has pacemaker.  Patient is cleared for discharge patient neurologic deficits has been resolved.

## 2024-10-02 NOTE — DISCHARGE SUMMARY
[facility] Medicine  Discharge Summary      Patient Name: Ayla Dela Cruz  MRN: [id number]  [facility]: [id number]  Patient Class: OP- Observation  Admission Date: 9/30/2024  Hospital Length of Stay: 0 days  Discharge Date and Time: 10/1/2024  5:17 PM  Attending Physician: No att. providers found   Discharging Provider: Jyoti Steele MD  Primary Care Provider: Jan Olivo MD    Primary Care Team: Networked reference to record PCT     HPI:   Ayla Dela Cruz is an 79-year-old female who presents emergency room for evaluation of left face numbness which onset approximately 45 minutes prior to arrival to the emergency room.  She denies any focal weakness, dysarthria, or aphasia.  No facial asymmetry.  She reports a history of a TIA in the past (on Eliquis).  Denies fever or chills.  No known sick contacts or travel.  No aggravating alleviating factors.  Previous medical history includes paroxysmal AFib, pacemaker, hypertension, hyperlipidemia, GERD, interstitial lung disease, diastolic heart failure, iron-deficiency anemia, and obstructive sleep apnea on CPAP.  ER workup:  CBC and CMP were unremarkable.  CTA of the brain was negative for any acute findings.  Patient admitted to Hospital Medicine for treatment and management.  Patient placed on CVA pathway.  Will get neurology consult in a.m..            * No surgery found *      Hospital Course:   Patient admitted for further management, left facial numbness has resolved since admission, CTA of head and neck negative for large vessel occlusion, CT head is negative for acute changes.  Repeat CT head  24 hours later negative for acute changes.  Patient is on aspirin and low-dose Eliquis for stroke prophylaxis for Afib.  Evaluated by neurologist, most likely patient has TIA versus acute CVA.  Unable to do MRI since patient has pacemaker.  Patient is cleared for discharge patient neurologic deficits has been resolved.      Goals of Care  Treatment Preferences:  Code Status: Full Code      SDOH Screening:  The patient was screened for utility difficulties, food insecurity, transport difficulties, housing insecurity, and interpersonal safety and there were no concerns identified this admission.     Consults:   Consults (From admission, onward)          Status Ordering Provider     Inpatient consult to Registered Dietitian/Nutritionist  Once        Provider:  (Not yet assigned)    Completed AMAIRANI PENNY     IP consult to case management/social work  Once        Provider:  (Not yet assigned)    Completed AMAIRANI PENNY     Inpatient consult to Neurology  Once        Provider:  Javier Cormier MD    Completed AMAIRANI PENNY            Neuro  * TIA (transient ischemic attack), 11/3/2014  Left facial numbness (resolved)  Antithrombotics for secondary stroke prevention: Antiplatelets: Aspirin: 81 mg daily    Statins for secondary stroke prevention and hyperlipidemia, if present:   Statins: Atorvastatin- 40 mg daily    Aggressive risk factor modification: HTN     Rehab efforts: The patient has been evaluated by a stroke team provider and the therapy needs have been fully considered based off the presenting complaints and exam findings. The following therapy evaluations are needed: PT evaluate and treat, OT evaluate and treat, SLP evaluate and treat    Diagnostics ordered/pending: CTA Head to assess vasculature     VTE prophylaxis: Mechanical prophylaxis: Place SCDs    BP parameters: TIA: SBP <220 until imaging confirmation of no infarct         Cardiac/Vascular  Cardiac pacemaker in situ, [employer], dual chamber, 10/14/2021  Chronic problem   History noted      Chronic diastolic heart failure, 2014  Chronic problem   Stable   Continuous telemetry monitoring         Other  NILSA on CPAP, using 53% to 70%  Chronic problem   CPAP p.r.n.        Final Active Diagnoses:    Diagnosis Date Noted POA    PRINCIPAL PROBLEM:  TIA (transient  ischemic attack), 11/3/2014 [G45.9] 11/03/2014 Yes    NILSA on CPAP, using 53% to 70% [G47.33] 12/20/2023 Yes    Cardiac pacemaker in situ, [employer], dual chamber, 10/14/2021 [Z95.0] 01/12/2022 Yes    Chronic diastolic heart failure, 2014 [I50.32] 10/27/2014 Yes      Problems Resolved During this Admission:       Discharged Condition: stable    Disposition: Home or Self Care    Follow Up:   Follow-up Information       Jan Olivo MD. Schedule an appointment as soon as possible for a visit in 1 week(s).    Specialty: Family Medicine  Contact information:  [address]  [address]  [place] [id number] [phone]               Javier Cormier MD. Schedule an appointment as soon as possible for a visit in 1 week(s).    Specialty: Neurology  Contact information:  [address]  [place] [address]  [phone]                           Patient Instructions:   No discharge procedures on file.    Significant Diagnostic Studies: Labs: CMP   Recent Labs   Lab 09/30/24  1510 10/01/24  0302   * 140   K 4.0 3.3*   CL 98 101   CO2 22* 26    84   BUN 34* 27*   CREATININE 1.1 1.0   CALCIUM 9.6 9.5   PROT 7.4 6.8   ALBUMIN 3.8 3.6   BILITOT 0.9 0.9   ALKPHOS 87 78   AST 32 28   ALT 24 21   ANIONGAP 14 13    and CBC   Recent Labs   Lab 09/30/24  1510 10/01/24  0302   WBC 13.01* 9.33   HGB 12.7 11.7*   HCT 38.4 35.6*    266       Pending Diagnostic Studies:       None           Medications:  Reconciled Home Medications:      Medication List        START taking these medications      ezetimibe 10 mg tablet  Commonly known as: ZETIA  Take 1 tablet (10 mg total) by mouth every evening.            CHANGE how you take these medications      atropine 1% 1 % Drop  Commonly known as: ISOPTO ATROPINE  PLACE 1 DROP INTO THE RIGHT EYE 2 TIMES A DAY.  What changed: See the new instructions.     predniSONE 20 MG tablet  Commonly known as: DELTASONE  Take one daily for 3 days and may repeat for shortness of  breath.  What changed:   how much to take  how to take this  when to take this  reasons to take this            CONTINUE taking these medications      apixaban 2.5 mg Tab  Commonly known as: ELIQUIS  Take 1 tablet (2.5 mg total) by mouth 2 (two) times daily.     aspirin 81 MG Chew  Take 81 mg by mouth once daily.     azelastine 137 mcg (0.1 %) nasal spray  Commonly known as: ASTELIN  1 spray (137 mcg total) by Nasal route 2 (two) times daily.     busPIRone 10 MG tablet  Commonly known as: BUSPAR  Take 1 tablet (10 mg total) by mouth 3 (three) times daily.     furosemide 20 MG tablet  Commonly known as: LASIX  Take 1 tablet (20 mg total) by mouth daily as needed (Edema).     latanoprost 0.005 % ophthalmic solution  Place 1 drop into both eyes once daily.     mirtazapine 7.5 MG Tab  Commonly known as: REMERON  Take 1 tablet (7.5 mg total) by mouth every evening.     pantoprazole 40 MG tablet  Commonly known as: PROTONIX  TAKE 1 TABLET (40 MG TOTAL) BY MOUTH ONCE DAILY.     propranoloL 40 MG tablet  Commonly known as: INDERAL  TAKE 1 TABLET BY MOUTH TWICE A DAY     triamterene-hydrochlorothiazide 75-50 mg 75-50 mg per tablet  Commonly known as: MAXZIDE  Take 1 tablet by mouth once daily.     TRINTELLIX 20 mg Tab  Generic drug: vortioxetine  Take 1 tablet (20 mg total) by mouth Daily.            CT Head Without Contrast    Result Date: 10/1/2024  EXAMINATION: CT HEAD WITHOUT CONTRAST CLINICAL HISTORY: Stroke, follow up; TECHNIQUE: Routine unenhanced axial images were obtained through the head.  Sagittal and coronal reformatted images were created.  The study is reviewed in bone and soft tissue windows. COMPARISON: CTA head and neck dated 09/30/2024, brain MRI dated 11/09/2023 FINDINGS: Intracranial contents: There is no acute abnormality or detectable change in the appearance of the brain compared to yesterday's head CT.  There is mild volume loss.  There is mild nonspecific periventricular white matter hypodensity  which likely reflect sequelae of chronic small vessel disease.  There is no hemorrhage.  There is no mass.  The gray-white interface appears preserved without acute infarction.  There is no abnormal extra-axial fluid collection.  There is no hydrocephalus or midline shift.  The cerebellar tonsils remain in normal position.  Sellar structures are normal.  The patient has had bilateral lens replacement surgery. Extracranial contents, calvarium, soft tissues: There is mucosal thickening in the right sphenoid sinus. Bony margins of the right sphenoid sinus are somewhat sclerotic and mildly thickened suggesting a chronic process. Otherwise, the paranasal sinuses and mastoid air cells are clear. There are changes of hyperostosis frontalis interna. The calvarium is otherwise normal.  There is a left-sided torin bullosa.     1. There is no acute abnormality or detectable change in the appearance of the brain compared to yesterday study.  There is mild volume loss and nonspecific white matter change.  There is no hemorrhage, mass effect or obvious acute infarction.  It should be noted that MRI is more sensitive in the detection of subtle or acute nonhemorrhagic ischemic disease. Electronically signed by: Johnny Jean MD Date:    10/01/2024 Time:    14:30    CTA Head and Neck (xpd)    Result Date: 9/30/2024  EXAMINATION: CTA HEAD AND NECK (XPD) CLINICAL HISTORY: Neuro deficit, acute, stroke suspected; TECHNIQUE: Non contrast low dose axial images were obtained through the head. CT angiogram was performed from the level of the ortega to the top of the head following the IV administration of 75mL of Omnipaque 350.   Sagittal and coronal reconstructions and maximum intensity projection reconstructions were performed. Arterial stenosis percentages are based on NASCET measurement criteria. COMPARISON: MRI brain 11/09/2023, CTA head neck 10/25/2023 FINDINGS: Brain: No evidence of acute intracranial hemorrhage. The ventricles  and sulci are appropriate in size and configuration for the patient's age without hydrocephalus. Mild scattered hypoattenuation within the supratentorial white matter, nonspecific but probably reflecting sequelae of chronic small vessel ischemic change.  There is no significant mass effect, midline shift, or extra-axial fluid collection. Gray-white matter differentiation is within normal limits without evidence of an acute major vascular territory infarct. No abnormal intracranial enhancement. Bilateral lens replacements are noted.  The paranasal sinuses are normal.  The visualized portions of the mastoids are unremarkable. No acute calvarial fracture. Extracranial: Aorta and great vessels: Left-sided aortic arch with normal configuration.   No evidence of hemodynamically significant stenosis of the origins of the great vessels from the arch. Subclavian arteries: The subclavian arteries are without hemodynamically significant stenosis or occlusion. Right carotid: The right common carotid artery is without significant stenosis. Mild calcific atherosclerotic plaque at the carotid bifurcation.  The right internal carotid artery is without hemodynamically significant (>50%) stenosis, occlusion, or dissection.  Persistent luminal irregularity with somewhat beaded appearance of the proximal/mid cervical internal carotid artery, which may reflect changes of atherosclerotic disease or fibromuscular dysplasia, similar to prior exam. Left carotid: The left common carotid artery is without significant stenosis. Moderate calcific atherosclerotic plaque at the carotid bifurcation.  The left internal carotid artery is without hemodynamically significant (>50%) stenosis, occlusion, or dissection. Extracranial vertebral arteries: The left vertebral artery is dominant.  The vertebral arteries are without significant stenosis, occlusion, or dissection to the skull base. Intracranial: Anterior circulation: Mild calcific  atherosclerosis of bilateral carotid artery siphons.   No areas of significant atherosclerotic narrowing or filling defects are identified.  The middle cerebral arteries are normal.  Hypoplastic right anterior cerebral artery A1 segment with the A2 segment predominantly fed by the anterior communicating artery, normal developmental variant.  Left anterior cerebral artery is normal. Posterior circulation: The vertebral arteries are normal. The basilar artery is normal. The posterior cerebral arteries are normal. No evidence of aneurysm or arteriovenous malformation. Dural venous sinuses are patent. Other: Left anterior chest wall pacemaker device with transvenous leads extending to the heart partially imaged. The visualized portions of the lung apices are grossly clear.  Stable 3 mm ground-glass nodule in the left upper lobe.  Sub center calcified granulomas in the posterior right upper lobe. Enlarged, multinodular thyroid gland, better characterized on prior dedicated thyroid ultrasound. There are degenerative spine changes. No concerning osseous lesions identified.     1. No evidence of an acute intracranial abnormality. Further evaluation with MRI could be performed, as clinically warranted. 2. Mild generalized cerebral volume loss and mild scattered nonspecific supratentorial white matter hypoattenuation probably reflecting sequelae of chronic small vessel ischemic change. 3. No intracranial high-grade stenosis, large vessel occlusion, aneurysm or arteriovenous malformation. 4. No hemodynamically significant stenosis, major branch occlusion, aneurysm or dissection in the neck arterial vasculature. 5. Stable beaded appearance of the right cervical internal carotid artery which may be secondary to atherosclerotic disease or fibromuscular dysplasia. 6. Enlarged, multinodular thyroid gland, better characterized on prior dedicated thyroid ultrasound. The significant finding above was relayed by myself  by telephone to  Ross Villar MD on 09/30/2024 at 15:34. Electronically signed by: Kaushal Forte Date:    09/30/2024 Time:    15:38    Mammo Digital Screening Bilat w/ Arslan    Result Date: 9/16/2024  Facility: [facility] [address] [place] [address] [phone] Name: Ayla Dela Cruz MRN: [id number] Result: Mammo Digital Screening Bilat w/ Arslan History: Patient is 79 y.o. and is seen for a screening mammogram. Films Compared: Compared to: 06/07/2022 Mammo Digital Screening Bilat w/ Arslan Findings: This procedure was performed using tomosynthesis. Computer-aided detection was utilized in the interpretation of this examination. There is no evidence of suspicious masses, microcalcifications or architectural distortion. Breast Density: There are scattered areas of fibroglandular density.      No mammographic evidence of malignancy. BI-RADS Category 1: Negative Recommendation: Routine screening mammogram in 1 year, or as clinically indicated. Your estimated lifetime risk of breast cancer (to age 85) based on Tyrer-Cuzick risk assessment model is 0.46%.  According to the American Cancer Society, patients with a lifetime breast cancer risk of 20% or higher might benefit from supplemental screening tests, such as screening breast MRI. Subhash Baker MD   - pulls last radiology orders    Indwelling Lines/Drains at time of discharge:   Lines/Drains/Airways       None                   Time spent on the discharge of patient: 35 minutes         Jyoti Steele MD  Department of Hospital Medicine  [facility]/Surg

## 2024-10-03 ENCOUNTER — PATIENT OUTREACH (OUTPATIENT)
Dept: ADMINISTRATIVE | Facility: CLINIC | Age: 79
End: 2024-10-03
Payer: MEDICARE

## 2024-10-03 NOTE — PROGRESS NOTES
C3 nurse spoke with Ayla Dela Cruz (spouse, Jan) for a TCC post hospital discharge follow up call. The patient does not have a scheduled HOSFU appointment with Jan Olivo MD within 5-7 days post hospital discharge date 10/01/2024. C3 nurse was unable to schedule HOSFU appointment in [facility].    Message sent to PCP staff requesting they contact patient and schedule follow up appointment.     Patient's spouse declined an NP care at home appointment.

## 2024-10-04 ENCOUNTER — PATIENT MESSAGE (OUTPATIENT)
Dept: CARDIOLOGY | Facility: CLINIC | Age: 79
End: 2024-10-04
Payer: MEDICARE

## 2024-10-04 LAB
CLINICAL INFO: NORMAL
CYTO CVX: NORMAL
CYTOLOGIST CVX/VAG CYTO: NORMAL
CYTOLOGIST CVX/VAG CYTO: NORMAL
CYTOLOGY CMNT CVX/VAG CYTO-IMP: NORMAL
CYTOLOGY PAP THIN PREP EXPLANATION: NORMAL
DATE OF PREVIOUS PAP: NORMAL
DATE PREVIOUS BX: NO
GEN CATEG CVX/VAG CYTO-IMP: NORMAL
LMP START DATE: NORMAL
MICROORGANISM CVX/VAG CYTO: NORMAL
OHS CV AF BURDEN PERCENT: < 1
OHS CV DC REMOTE DEVICE TYPE: NORMAL
OHS CV ICD SHOCK: NO
OHS CV RV PACING PERCENT: 16 %
PATHOLOGIST CVX/VAG CYTO: NORMAL
SERVICE CMNT-IMP: NORMAL
SPECIMEN SOURCE CVX/VAG CYTO: NORMAL
STAT OF ADQ CVX/VAG CYTO-IMP: NORMAL

## 2024-10-05 ENCOUNTER — PATIENT MESSAGE (OUTPATIENT)
Dept: OBSTETRICS AND GYNECOLOGY | Facility: CLINIC | Age: 79
End: 2024-10-05
Payer: MEDICARE

## 2024-10-06 LAB
OHS QRS DURATION: 116 MS
OHS QTC CALCULATION: 535 MS

## 2024-10-07 RX ORDER — BUSPIRONE HYDROCHLORIDE 10 MG/1
10 TABLET ORAL 3 TIMES DAILY
Qty: 270 TABLET | Refills: 0 | Status: SHIPPED | OUTPATIENT
Start: 2024-10-07

## 2024-10-16 NOTE — PROGRESS NOTES
Outpatient Psychiatry Follow-Up Visit    Clinical Status of Patient: Outpatient (Ambulatory)  10/17/2024     Chief Complaint: 76 y/o female presenting today with  for a follow-up.       Interval History and Content of Current Session:  Interim Events/Subjective Report/Content of Current Session: 76 y/o female follow-up appointment.    Pt is a 76 y/o female with past psychiatric hx of depression, anxiety, eating disorder who presents for follow-up treatment. Pt reported that she has been doing well. Was able to travel to [place] to see family for the first time in 4 years. Enjoyed the time. Was having some numbness in face.  has started giving pt an ativan each morning. Feeling better.     Past Psychiatric hx: remeron, rexulti, vraylar, Limbitrol, venlafaxine, fluoxetine, Abilify, gabapentin, amitriptyline, buspirone, cannot remember others.     Past Medical hx:   Past Medical History:   Diagnosis Date    Anticoagulant long-term use     Anxiety     Arthritis     Atrial fibrillation     Basal cell carcinoma     Cancer     skin    CHF (congestive heart failure)     Depression     DVT (deep venous thrombosis)     GERD (gastroesophageal reflux disease)     Glaucoma (increased eye pressure)     Hyperlipidemia     diet controlled    Hypertension     Interstitial lung disease     Localized hives 01/10/2020    Mild persistent asthma without complication 11/12/2018    Pacemaker     Pneumonia 01/31/2014    Stroke 06/03/2014    Stroke     TIA (transient ischemic attack)     TIA (transient ischemic attack)         Interim hx:  Medication changes last visit: none  Anxiety: decrease  Depression: decrease     Denies suicidal/homicidal ideations.  Denies hopelessness/worthlessness.    Denies auditory/visual hallucinations      Alcohol: Infrequent use  Drug: Pt denied  Caffeine: Not assessed  Tobacco: Pt denied      Review of Systems   PSYCHIATRIC: Pertinent items are noted in the narrative.        CONSTITUTIONAL:  weight stable    Past Medical, Family and Social History: The patient's past medical, family and social history have been reviewed and updated as appropriate within the electronic medical record. See encounter notes.     Current Psychiatric Medication:  ativan 0.5 mg PRN, buspirone 10 mg TID, trintillex 20 mg, mirtazapine 7.5 mg      Compliance: yes      Side effects: Pt denied     Risk Parameters:  Patient reports no suicidal ideation  Patient reports no homicidal ideation  Patient reports no self-injurious behavior  Patient reports no violent behavior     Exam (detailed: at least 9 elements; comprehensive: all 15 elements)   Constitutional  Vitals:  Most recent vital signs, dated less than 90 days prior to this appointment, were reviewed. BP: ()/()   Arterial Line BP: ()/()       General:  unremarkable, age appropriate, casual attire, good eye contact, good rapport       Musculoskeletal  Muscle Strength/Tone:  no flaccidity, no tremor    Gait & Station:  normal      Psychiatric                       Speech:  normal tone, normal rate, rhythm, and volume   Mood & Affect:   Mildly anxious         Thought Process:   Goal directed; Linear    Associations:   intact   Thought Content:   No SI/HI, delusions, or paranoia, no AV/VH   Insight & Judgement:   Good, adequate to circumstances   Orientation:   grossly intact; alert and oriented x 4    Memory:  intact for content of interview    Language:  grossly intact, can repeat    Attention Span  : Grossly intact for content of interview   Fund of Knowledge:   intact and appropriate to age and level of education        Assessment and Diagnosis   Status/Progress: Based on the examination today, the patient's problem(s) is/are adequately controlled.  New problems have not been presented today. Co-morbidities are not complicating management of the primary condition. There are no active rule-out diagnoses for this patient at this time.      Impression: Pt continues to respond  well to medication plan. Overall, however, she is eating well and fears of swallowing/choking also appear to have decreased. Pt's  did increase administration of lorazepam to daily and we discussed concerns with that approach. Will continue with medication plan and monitor symptoms moving forward.     Diagnosis:   1) Major Depressive Disorder, recurrent, moderate  2) Generalized Anxiety Disorder  3) Specific Phobia  4) Panic Disorder   5) Personality Disorder traits  6) R/o Dementia of unknown etiology  Intervention/Counseling/Treatment Plan   Medication Management:      1. ativan 0.5 mg PRN    2. buspirone to 10 mg TID    3. trintillex 20 mg    4. mirtazapine 7.5 mg     5. Call to report any worsening of symptoms or problems with the medication. Pt instructed to go to ER with thoughts of harming self, others     6. Cont in therapy as needed    Psychotherapy: 20 minutes  Target symptoms: anxiety  Why chosen therapy is appropriate versus another modality: CBT used; relevant to diagnosis, patient responds to this modality  Outcome monitoring methods: self-report, observation  Therapeutic intervention type: Cognitive Behavioral Therapy, Coping  Topics discussed/themes: building skills sets for symptom management, symptom recognition, nutrition, exercise  The patient's response to the intervention is accepting  Patient's response to treatment is: good.   The patient's progress toward treatment goals: limited to fair     Return to clinic: 3 months    -Cognitive-Behavioral/Supportive therapy and psychoeducation provided  -R/B/SE's of medications discussed with the pt who expresses understanding and chooses to take medications as prescribed.   -Pt instructed to call clinic, 911 or go to nearest emergency room if sxs worsen or pt is in   crisis. The pt expresses understanding.    Galo Lipscomb, PhD, MP    Visit today included increased complexity associated with the care of the episodic problem depression, anxiety  addressed and managing the longitudinal care of the patient due to the serious and/or complex managed problem(s) depression, anxiety.      Antidepressant/Antianxiety Medication Initiation:  Patient informed of risks, benefits, and potential side effects of medication and accepts informed consent.  Common side effects include nausea, fatigue, headache, insomnia., Specifically discussed the possibility of new or worsening suicidal thoughts/depression.  Patient instructed to stop the medication immediately and seek urgent treatment if this occurs. Patient instructed not to abruptly discontinue medication without physician guidance except in cases of sudden onset or worsening of SI.       Stimulant Medication Initiation:  Patient advised of risks, benefits, and side effects of medication and accepts informed consent.  Common side effects include insomnia, irritability, jittery feeling, dry mouth, and agitation/hostility., Patient advised of potential addictive nature of medication and controlled substance classification.  Instructed to safeguard medication as no early refills can be given for lost or stolen medications.       Benzodiazepine Initiation:  Patient advised of the risks, benefits, and common side effects of medication and has accepted informed consent.  Common side effects include drowsiness, impaired coordination, possible memory loss., Patient advised NOT to operate a vehicle or machinery untiil they are sure how the medication will affec them.  Client also advised of danger of mixing this medication with alcohol., Patient advised of potential addictive nature of medication and need to safeguard medication as no early refills for lost or stolen medications can be authorized.

## 2024-10-17 ENCOUNTER — OFFICE VISIT (OUTPATIENT)
Dept: PSYCHIATRY | Facility: CLINIC | Age: 79
End: 2024-10-17
Payer: MEDICARE

## 2024-10-17 VITALS
SYSTOLIC BLOOD PRESSURE: 97 MMHG | WEIGHT: 114.19 LBS | BODY MASS INDEX: 17.92 KG/M2 | HEIGHT: 67 IN | DIASTOLIC BLOOD PRESSURE: 60 MMHG | HEART RATE: 60 BPM

## 2024-10-17 DIAGNOSIS — F33.1 MAJOR DEPRESSIVE DISORDER, RECURRENT, MODERATE: Primary | ICD-10-CM

## 2024-10-17 DIAGNOSIS — R41.89 COGNITIVE IMPAIRMENT: ICD-10-CM

## 2024-10-17 DIAGNOSIS — F41.1 GENERALIZED ANXIETY DISORDER: ICD-10-CM

## 2024-10-17 DIAGNOSIS — F41.0 PANIC DISORDER: ICD-10-CM

## 2024-10-17 DIAGNOSIS — F40.298 SPECIFIC PHOBIA: ICD-10-CM

## 2024-10-17 PROCEDURE — 1101F PT FALLS ASSESS-DOCD LE1/YR: CPT | Mod: CPTII,S$GLB,, | Performed by: PSYCHOLOGIST

## 2024-10-17 PROCEDURE — 3288F FALL RISK ASSESSMENT DOCD: CPT | Mod: CPTII,S$GLB,, | Performed by: PSYCHOLOGIST

## 2024-10-17 PROCEDURE — 1126F AMNT PAIN NOTED NONE PRSNT: CPT | Mod: CPTII,S$GLB,, | Performed by: PSYCHOLOGIST

## 2024-10-17 PROCEDURE — G2211 COMPLEX E/M VISIT ADD ON: HCPCS | Mod: S$GLB,,, | Performed by: PSYCHOLOGIST

## 2024-10-17 PROCEDURE — 90833 PSYTX W PT W E/M 30 MIN: CPT | Mod: S$GLB,,, | Performed by: PSYCHOLOGIST

## 2024-10-17 PROCEDURE — 99214 OFFICE O/P EST MOD 30 MIN: CPT | Mod: S$GLB,,, | Performed by: PSYCHOLOGIST

## 2024-10-17 PROCEDURE — 1159F MED LIST DOCD IN RCRD: CPT | Mod: CPTII,S$GLB,, | Performed by: PSYCHOLOGIST

## 2024-10-17 PROCEDURE — 3074F SYST BP LT 130 MM HG: CPT | Mod: CPTII,S$GLB,, | Performed by: PSYCHOLOGIST

## 2024-10-17 PROCEDURE — 99999 PR PBB SHADOW E&M-EST. PATIENT-LVL III: CPT | Mod: PBBFAC,,, | Performed by: PSYCHOLOGIST

## 2024-10-17 PROCEDURE — 1157F ADVNC CARE PLAN IN RCRD: CPT | Mod: CPTII,S$GLB,, | Performed by: PSYCHOLOGIST

## 2024-10-17 PROCEDURE — 3078F DIAST BP <80 MM HG: CPT | Mod: CPTII,S$GLB,, | Performed by: PSYCHOLOGIST

## 2024-10-17 RX ORDER — LORAZEPAM 0.5 MG/1
0.5 TABLET ORAL DAILY PRN
Qty: 30 TABLET | Refills: 0 | Status: SHIPPED | OUTPATIENT
Start: 2024-10-17

## 2024-10-17 RX ORDER — MIRTAZAPINE 7.5 MG/1
7.5 TABLET, FILM COATED ORAL NIGHTLY
Qty: 90 TABLET | Refills: 0 | Status: SHIPPED | OUTPATIENT
Start: 2024-10-17 | End: 2025-10-17

## 2024-10-17 RX ORDER — VORTIOXETINE 20 MG/1
20 TABLET, FILM COATED ORAL DAILY
Qty: 90 TABLET | Refills: 0 | Status: SHIPPED | OUTPATIENT
Start: 2024-10-17

## 2024-11-11 ENCOUNTER — PATIENT MESSAGE (OUTPATIENT)
Dept: OPHTHALMOLOGY | Facility: CLINIC | Age: 79
End: 2024-11-11
Payer: MEDICARE

## 2024-11-11 RX ORDER — ATROPINE SULFATE 10 MG/ML
SOLUTION/ DROPS OPHTHALMIC
Qty: 5 ML | Refills: 6 | Status: SHIPPED | OUTPATIENT
Start: 2024-11-11

## 2024-11-22 ENCOUNTER — PATIENT MESSAGE (OUTPATIENT)
Dept: PSYCHIATRY | Facility: CLINIC | Age: 79
End: 2024-11-22
Payer: MEDICARE

## 2024-11-25 ENCOUNTER — TELEPHONE (OUTPATIENT)
Dept: OPTOMETRY | Facility: CLINIC | Age: 79
End: 2024-11-25
Payer: MEDICARE

## 2024-11-25 NOTE — TELEPHONE ENCOUNTER
Spoke with pt.   Rescheduled hvf & appt with Dr. Tan to 3-31-25 per pt request.  Appt reminder slip mailed to pt.

## 2024-11-25 NOTE — TELEPHONE ENCOUNTER
----- Message from Chris sent at 11/25/2024 12:59 PM CST -----  Type: Needs Medical Advice    Who Called:  Pt  Best Call Back Number: [phone]      Additional Information: Pt needs to reschedule mikayla that were for today due to illness. Please call back to advise, Thanks!

## 2024-12-02 ENCOUNTER — TELEPHONE (OUTPATIENT)
Dept: FAMILY MEDICINE | Facility: CLINIC | Age: 79
End: 2024-12-02
Payer: MEDICARE

## 2024-12-02 NOTE — TELEPHONE ENCOUNTER
----- Message from Miryam sent at 12/2/2024  4:08 PM CST -----  Contact: son/Dina  Type: Needs Medical Advice  Who Called:  son in law/Dina     Best Call Back Number: [phone]  Additional Information: please call Dina [phone], Dina states Ayla's  had open heart surgery and Ayla needs help with inhome care for her health. How do they get assistance with that. Please call.

## 2024-12-03 ENCOUNTER — TELEPHONE (OUTPATIENT)
Dept: FAMILY MEDICINE | Facility: CLINIC | Age: 79
End: 2024-12-03
Payer: MEDICARE

## 2024-12-03 DIAGNOSIS — R54 FRAIL ELDERLY: ICD-10-CM

## 2024-12-03 DIAGNOSIS — I48.19 PERSISTENT ATRIAL FIBRILLATION: ICD-10-CM

## 2024-12-03 DIAGNOSIS — R41.89 COGNITIVE IMPAIRMENT: Primary | ICD-10-CM

## 2024-12-03 DIAGNOSIS — I50.30 HEART FAILURE WITH PRESERVED EJECTION FRACTION, UNSPECIFIED HF CHRONICITY: ICD-10-CM

## 2024-12-03 NOTE — TELEPHONE ENCOUNTER
Spoke to Cristo with patients permission. Family is in from [place] staying with Ayla while  is in hospital after heart surgery. Patient has meds in daily planner and has mistakenly taken meds twice in the morning forgetting she already took it. They will be leaving once her  is discharged and home after a while. They would like to have someone monitoring her and her meds and make sure she is taken care of. I did explain what home health covers.

## 2024-12-03 NOTE — TELEPHONE ENCOUNTER
----- Message from Lauren sent at 12/3/2024  1:08 PM CST -----  Contact: Cristo DEY  Type:  Patient Returning Call    Who Called:  Cristo   Who Left Message for Patient:  Naima  Does the patient know what this is regarding?:  about getting home health  Best Call Back Number:  [phone] or [phone] pt  Additional Information:  thanks   take prednisone as directed until completed   use additional medications as directed   continue home medications as prescribed  Follow up with PCP in 3-5 days  Proceed to  ER if symptoms worsen  Cold Symptoms   AMBULATORY CARE:   Cold symptoms  include sneezing, dry throat, a stuffy nose, headache, watery eyes, and a cough  Your cough may be dry, or you may cough up mucus  You may also have muscle aches, joint pain, and tiredness  Rarely, you may have a fever  Cold symptoms occur from inflammation in your upper respiratory system caused by a virus  Most colds go away without treatment  Seek care immediately if:   · You have increased tiredness and weakness  · You are unable to eat  · Your heart is beating much faster than usual for you  · You see white spots in the back of your throat and your neck is swollen and sore to the touch  · You see pinpoint or larger reddish-purple dots on your skin  Contact your healthcare provider if:   · You have a fever higher than 102°F (38 9°C)  · You have new or worsening shortness of breath  · You have thick nasal drainage for more than 2 days  · Your symptoms do not improve or get worse within 5 days  · You have questions or concerns about your condition or care  Treatment for cold symptoms  may include NSAIDS to decrease muscle aches and fever  Cold medicines may also be given to decrease coughing, nasal stuffiness, sneezing, and a runny nose  Manage your cold symptoms: The following may help relieve cold symptoms, such as a dry throat and congestion:  · Gargle with mouthwash or warm salt water as directed  · Suck on throat lozenges or hard candy  · Use a cold or warm vaporizer or humidifier to ease your breathing  · Rest for at least 2 days and then as needed to decrease tiredness and weakness  · Use petroleum based jelly around your nostrils to decrease irritation from blowing your nose  · Drink plenty of liquids  Liquids will help thin and loosen thick mucus so you can cough it up  Liquids will also keep you hydrated  Ask your healthcare provider which liquids are best for you and how much to drink each day  Prevent the spread of germs  by washing your hands often  You can spread your cold germs to others for at least 3 days after your symptoms start  Do not share items, such as eating utensils  Cover your nose and mouth when you cough or sneeze using the crook of your elbow instead of your hands  Throw used tissues in the garbage  Do not smoke:  Smoking may worsen your symptoms and increase the length of time you feel sick  Talk with your healthcare provider if you need help to stop smoking  Follow up with your healthcare provider as directed:  Write down your questions so you remember to ask them during your visits  © 2017 [employer]  Information is for End User's use only and may not be sold, redistributed or otherwise used for commercial purposes  All illustrations and images included in CareNotes® are the copyrighted property of [employer]  or [employer]  The above information is an  only  It is not intended as medical advice for individual conditions or treatments  Talk to your doctor, nurse or pharmacist before following any medical regimen to see if it is safe and effective for you

## 2024-12-03 NOTE — TELEPHONE ENCOUNTER
I can order home health which can give her assistance with bathing two or 3 times a week, a visiting nurse who can monitor medications and set up medications for a week at a time but they do not provide sitters or long term supervision.  If they are looking for someone to take care of her meals and be with her all day that is not home health.  They would be looking for something like visiting angels or home instead which are not covered by Medicare.

## 2024-12-26 ENCOUNTER — PATIENT MESSAGE (OUTPATIENT)
Dept: PSYCHIATRY | Facility: CLINIC | Age: 79
End: 2024-12-26
Payer: MEDICARE

## 2024-12-27 RX ORDER — MIRTAZAPINE 7.5 MG/1
7.5 TABLET, FILM COATED ORAL NIGHTLY
Qty: 90 TABLET | Refills: 0 | Status: SHIPPED | OUTPATIENT
Start: 2024-12-27 | End: 2025-12-27

## 2025-01-01 ENCOUNTER — CLINICAL SUPPORT (OUTPATIENT)
Dept: CARDIOLOGY | Facility: HOSPITAL | Age: 80
End: 2025-01-01
Payer: MEDICARE

## 2025-01-01 DIAGNOSIS — Z95.0 PRESENCE OF CARDIAC PACEMAKER: ICD-10-CM

## 2025-01-01 DIAGNOSIS — I48.91 UNSPECIFIED ATRIAL FIBRILLATION: ICD-10-CM

## 2025-01-01 PROCEDURE — 93294 REM INTERROG EVL PM/LDLS PM: CPT | Mod: ,,, | Performed by: INTERNAL MEDICINE

## 2025-01-02 ENCOUNTER — CLINICAL SUPPORT (OUTPATIENT)
Dept: CARDIOLOGY | Facility: HOSPITAL | Age: 80
End: 2025-01-02
Attending: INTERNAL MEDICINE
Payer: MEDICARE

## 2025-01-02 DIAGNOSIS — Z95.0 PRESENCE OF CARDIAC PACEMAKER: ICD-10-CM

## 2025-01-02 DIAGNOSIS — I48.91 UNSPECIFIED ATRIAL FIBRILLATION: ICD-10-CM

## 2025-01-02 PROCEDURE — 93296 REM INTERROG EVL PM/IDS: CPT | Performed by: INTERNAL MEDICINE

## 2025-01-03 LAB
OHS CV AF BURDEN PERCENT: < 1
OHS CV DC REMOTE DEVICE TYPE: NORMAL
OHS CV RV PACING PERCENT: 34 %

## 2025-01-03 RX ORDER — BUSPIRONE HYDROCHLORIDE 10 MG/1
10 TABLET ORAL 3 TIMES DAILY
Qty: 270 TABLET | Refills: 0 | Status: SHIPPED | OUTPATIENT
Start: 2025-01-03

## 2025-01-06 ENCOUNTER — OFFICE VISIT (OUTPATIENT)
Dept: CARDIOLOGY | Facility: CLINIC | Age: 80
End: 2025-01-06
Payer: MEDICARE

## 2025-01-06 VITALS
OXYGEN SATURATION: 100 % | DIASTOLIC BLOOD PRESSURE: 58 MMHG | SYSTOLIC BLOOD PRESSURE: 115 MMHG | HEIGHT: 67 IN | BODY MASS INDEX: 18.13 KG/M2 | HEART RATE: 60 BPM | WEIGHT: 115.5 LBS

## 2025-01-06 DIAGNOSIS — R06.09 DOE (DYSPNEA ON EXERTION): ICD-10-CM

## 2025-01-06 DIAGNOSIS — I48.0 PAROXYSMAL ATRIAL FIBRILLATION: ICD-10-CM

## 2025-01-06 DIAGNOSIS — Z91.89 AT RISK FOR CARDIOVASCULAR EVENT: ICD-10-CM

## 2025-01-06 DIAGNOSIS — Z91.89 SEDENTARY LIFESTYLE: ICD-10-CM

## 2025-01-06 DIAGNOSIS — L29.9 CHRONIC PRURITUS: ICD-10-CM

## 2025-01-06 DIAGNOSIS — R79.89 ELEVATED BRAIN NATRIURETIC PEPTIDE (BNP) LEVEL: ICD-10-CM

## 2025-01-06 DIAGNOSIS — F13.20 BENZODIAZEPINE DEPENDENCE: ICD-10-CM

## 2025-01-06 DIAGNOSIS — I50.32 CHRONIC DIASTOLIC HEART FAILURE: ICD-10-CM

## 2025-01-06 DIAGNOSIS — Z95.0 CARDIAC PACEMAKER IN SITU: ICD-10-CM

## 2025-01-06 DIAGNOSIS — E87.6 HYPOKALEMIA: ICD-10-CM

## 2025-01-06 DIAGNOSIS — G45.9 TIA (TRANSIENT ISCHEMIC ATTACK): Primary | ICD-10-CM

## 2025-01-06 DIAGNOSIS — E78.00 PURE HYPERCHOLESTEROLEMIA: ICD-10-CM

## 2025-01-06 DIAGNOSIS — N18.31 STAGE 3A CHRONIC KIDNEY DISEASE: ICD-10-CM

## 2025-01-06 DIAGNOSIS — Z79.01 ANTICOAGULANT LONG-TERM USE: ICD-10-CM

## 2025-01-06 DIAGNOSIS — R79.89 PRERENAL AZOTEMIA: ICD-10-CM

## 2025-01-06 DIAGNOSIS — D50.0 IRON DEFICIENCY ANEMIA DUE TO CHRONIC BLOOD LOSS: ICD-10-CM

## 2025-01-06 PROCEDURE — 1101F PT FALLS ASSESS-DOCD LE1/YR: CPT | Mod: CPTII,S$GLB,, | Performed by: INTERNAL MEDICINE

## 2025-01-06 PROCEDURE — 1157F ADVNC CARE PLAN IN RCRD: CPT | Mod: CPTII,S$GLB,, | Performed by: INTERNAL MEDICINE

## 2025-01-06 PROCEDURE — 1160F RVW MEDS BY RX/DR IN RCRD: CPT | Mod: CPTII,S$GLB,, | Performed by: INTERNAL MEDICINE

## 2025-01-06 PROCEDURE — 93000 ELECTROCARDIOGRAM COMPLETE: CPT | Mod: S$GLB,,, | Performed by: INTERNAL MEDICINE

## 2025-01-06 PROCEDURE — 3074F SYST BP LT 130 MM HG: CPT | Mod: CPTII,S$GLB,, | Performed by: INTERNAL MEDICINE

## 2025-01-06 PROCEDURE — 3078F DIAST BP <80 MM HG: CPT | Mod: CPTII,S$GLB,, | Performed by: INTERNAL MEDICINE

## 2025-01-06 PROCEDURE — 1159F MED LIST DOCD IN RCRD: CPT | Mod: CPTII,S$GLB,, | Performed by: INTERNAL MEDICINE

## 2025-01-06 PROCEDURE — 99215 OFFICE O/P EST HI 40 MIN: CPT | Mod: 25,S$GLB,, | Performed by: INTERNAL MEDICINE

## 2025-01-06 PROCEDURE — 99999 PR PBB SHADOW E&M-EST. PATIENT-LVL IV: CPT | Mod: PBBFAC,,, | Performed by: INTERNAL MEDICINE

## 2025-01-06 PROCEDURE — 3288F FALL RISK ASSESSMENT DOCD: CPT | Mod: CPTII,S$GLB,, | Performed by: INTERNAL MEDICINE

## 2025-01-06 PROCEDURE — 1126F AMNT PAIN NOTED NONE PRSNT: CPT | Mod: CPTII,S$GLB,, | Performed by: INTERNAL MEDICINE

## 2025-01-06 RX ORDER — ROSUVASTATIN CALCIUM 10 MG/1
10 TABLET, COATED ORAL DAILY
Qty: 90 TABLET | Refills: 3 | Status: SHIPPED | OUTPATIENT
Start: 2025-01-06 | End: 2026-01-06

## 2025-01-06 NOTE — PROGRESS NOTES
Subjective:    Patient ID:  Ayla Dela Cruz is a 79 y.o. 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 6-months follow up. Post hospitalization for TIA 10/1/2024.  PCP: Dr. Olivo, see annually, do labs, will be seeing Dr. Botello  Opthalmologist: Daniel Tan MD  EP: Dr. Calderon, last seen 3/2024  Orthopedic: Dr. Aguero  Surgeon: Dr. Gonsalez, and Dr. Dc  Rheumatologist: Dr. Pak  Prior cardiologist: Dr. Gibson, last seen over a year  Pulmonary: Marichuy Mcelroy, NP, Lars Bryson MD   Psychologist: Galo Lipscomb, PhD, MP  Gyn: Dr. Jordan  GI: Dr. Schultz  Neurologist: Dr. Ramirez  Dermatologist: Dennis Corrigan  Pain: Dr. 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  Diet: regular, poor appetite  Caffeine: none  Labs: 1/2019 LDL 90.6 on Crestor 40 mg, normal TSH, CMP (albumin 3.1), normal CBC, Vit. D  10/2019 AST 95, Hgb 11.4  Lab Results   Component Value Date    TSH 1.934 09/30/2024        Lab Results   Component Value Date    HGBA1C 5.9 (H) 09/05/2024       Normal H&H in 11/15/2021  Lab Results   Component Value Date    WBC 9.33 10/01/2024    HGB 11.7  (L) 10/01/2024    HCT 35.6 (L) 10/01/2024    MCV 99 (H) 10/01/2024     10/01/2024       CMP  Sodium   Date Value Ref Range Status   10/01/2024 140 136 - 145 mmol/L Final     Potassium   Date Value Ref Range Status   10/01/2024 3.3 (L) 3.5 - 5.1 mmol/L Final     Chloride   Date Value Ref Range Status   10/01/2024 101 95 - 110 mmol/L Final     CO2   Date Value Ref Range Status   10/01/2024 26 23 - 29 mmol/L Final     Glucose   Date Value Ref Range Status   10/01/2024 84 70 - 110 mg/dL Final     BUN   Date Value Ref Range Status   10/01/2024 27 (H) 8 - 23 mg/dL Final     Creatinine   Date Value Ref Range Status   10/01/2024 1.0 0.5 - 1.4 mg/dL Final   09/24/2012 0.6 0.2 - 1.4 mg/dl Final     Calcium   Date Value Ref Range Status   10/01/2024 9.5 8.7 - 10.5 mg/dL Final   09/24/2012 9.9 8.6 - 10.2 mg/dl Final     Total Protein   Date Value Ref Range Status   10/01/2024 6.8 6.0 - 8.4 g/dL Final     Albumin   Date Value Ref Range Status   10/01/2024 3.6 3.5 - 5.2 g/dL Final     Total Bilirubin   Date Value Ref Range Status   10/01/2024 0.9 0.1 - 1.0 mg/dL Final     Comment:     For infants and newborns, interpretation of results should be based  on gestational age, weight and in agreement with clinical  observations.    Premature Infant recommended reference ranges:  Up to 24 hours.............<8.0 mg/dL  Up to 48 hours............<12.0 mg/dL  3-5 days..................<15.0 mg/dL  6-29 days.................<15.0 mg/dL       Alkaline Phosphatase   Date Value Ref Range Status   10/01/2024 78 55 - 135 U/L Final   09/24/2012 63 23 - 119 UNIT/L Final     AST   Date Value Ref Range Status   10/01/2024 28 10 - 40 U/L Final   09/24/2012 26 10 - 30 UNIT/L Final     ALT   Date Value Ref Range Status   10/01/2024 21 10 - 44 U/L Final     Anion Gap   Date Value Ref Range Status   10/01/2024 13 8 - 16 mmol/L Final   09/24/2012 12 5 - 15 meq/L Final     eGFR if    Date Value Ref Range Status   07/23/2022 >60.0  >60 mL/min/1.73 m^2 Final     eGFR if non    Date Value Ref Range Status   07/23/2022 >60.0 >60 mL/min/1.73 m^2 Final     Comment:     Calculation used to obtain the estimated glomerular filtration  rate (eGFR) is the CKD-EPI equation.        @labrcntip(troponini)@    BNP   Date Value Ref Range Status   06/13/2024 141 (H) 0 - 99 pg/mL Final     Comment:     Values of less than 100 pg/ml are consistent with non-CHF populations.   }   Lab Results   Component Value Date    CHOL 247 (H) 09/30/2024    CHOL 218 (H) 10/25/2023    CHOL 242 (H) 10/05/2023     Lab Results   Component Value Date    HDL 78 (H) 09/30/2024    HDL 74 10/25/2023    HDL 81 (H) 10/05/2023     Lab Results   Component Value Date    LDLCALC 141.0 09/30/2024    LDLCALC 129.0 10/25/2023    LDLCALC 142.4 10/05/2023     Lab Results   Component Value Date    TRIG 140 09/30/2024    TRIG 75 10/25/2023    TRIG 93 10/05/2023     Lab Results   Component Value Date    CHOLHDL 31.6 09/30/2024    CHOLHDL 33.9 10/25/2023    CHOLHDL 33.5 10/05/2023     Last PPM check: 1/2025   Last Echo: 9/2022  Last stress test: 9/2021, Lexiscan  Cardiovascular angiogram: none  ECG: A-paced, rate 60. LAFB, IRBBB, LVH, QTc 482 msec.  Fundoscopic exam: biannually, no retinopathy, being treated for glaucoma.    In 6/2014:  Hospitalized 6/3/2014 for acute right sided weakness and slurred speech  LEONARDO Dela Cruz is a 69 y.o. female admitted to the services of hospital medicine via the ER for acute CVA. Presented with difficulty speaking, facial drooping and weakness of the right upper and lower extremities. She missed the time window for tPA. ST/PT/OT as well as cardiology and neurology were consulted. Dr. Jurado of cardiology managed A fib. Patient rapidly improved functioning with PT/OT/ST. Currently her goals with PT are met as she is ambulating over 400 feet independently.  She was not approved for IP rehab and refuses SNF. She will be discharged home with  outpatient PT/ST/OT evaluation and treatment.    Neurocardio work up  CT head - Mild involutional changes and findings suggesting mild microvascular ischemic change with no acute intracranial findings    Carotid US - No hemodynamically significant stenosis is noted by velocity criteria involving either internal carotid artery.  A hemodynamically significant stenosis is defined as a stenosis of greater than 50% of the internal carotid artery vessel lumen    ECHO, 6/4/2014, CONCLUSIONS     1 - Concentric hypertrophy. Wall thickness is mildly increased, with the septum and the posterior wall each measuring 1.1 cm across.    2 - Normal left ventricular systolic function (EF 60-65%).     3 - Mild mitral regurgitation.     4 - Left ventricular diastolic dysfunction.     5 - Pulmonary hypertension. estimated PA systolic pressure is 64 mmHg.    6 - Normal right ventricular systolic function .     7 - Suggestion for increase in LV mass from echo on 3/18/2014..     Echo bubble study also negative. Had episode of PAF during monitoring and convert with restart of Flecainide.   Since DC, improving strength in the right hand, right leg feels normal but tire easier. Speech improving. To receive PT/OT/speech today.  Medications reviewed - will DC ASA for now, and know the effects of the Limbitrol and Ativan, uses prn rarely. Some loss of appetite and taste.    Active problems in hospital  Acute left hemispheric CVA, MRI suggest multiple areas, was not on OAC nor antiplatelet Rx  PAF with high CHADS-VAS score, post ablations and DCCs  HTN with LVH  Interstitial lung disease  Pulmonary hypertension  Hyperlipidemia was not on statin    Since visit of 6/20, problem with peripheral swelling, now taking Lasix daily. Last hospitalization / CMP, 6/3 showed K+ 3.4 with normal renal function. Also noted AVILA along with exertional chest heaviness, on-and-off over past several years. Noted it with walking and have to rest. Can last up to an  hour. Max grade 10/10, yesterday during the day.  in hospital. Also quite anxious, stopped Limbitrol due to side effects, managed by Dr. Olivo. Quite sedentary and major decrease in appetite, due to depression. No Psychiatrist. Home BP high 175/97. ECG shows NSR, rate 61, right axis, nonspecific T waves abnormalities, prolong QTc 483 msec. Low anterior forces.    Holter, 6/30/2014:  PREDOMINANT RHYTHM  1. Sinus rhythm with heart rates varying between 43 and 67 bpm with an average of 55 bpm.     VENTRICULAR ARRHYTHMIAS  1. There were frequent polymorphic PVCs totalling 1388 and averaging 40 per hour.  There was 1 couplet.    2. There were no episodes of ventricular tachycardia.    SUPRA VENTRICULAR ARRHYTHMIAS  1. There were very rare PACs totalling 47 and averaging 1 per hour.     2. There were no episodes of sustained supraventricular tachycardia.    SINUS NODE FUNCTION  1. There was no evidence of high grade SA khai block.     AV CONDUCTION  1. There was no evidence of high grade AV block.     DIARY  1. The diary was returned, but not completed    MISCELLANEOUS  1. This was a tape of adequate length (34 hrs).     Since visit of 7/24, better, reviewed by Dr. Olivo and started antidepressant Lexapro. BMP still showed mild hypokalemia of 3.3, not using Lasix at this time. Still feeling fatigue, anxiety, and request refill for Nexium. Discussed need for GI review on chronic PPI. Lack of appetite.  Lexiscan, 7/30/2014, - Nuclear Quantitative Functional Analysis:   LVEF: 66 % (normal is 55 - 69)  LVED Volume: 50 ml (normal is 60 - 98)  LVES Volume: 17 ml (normal is 20 - 42)    Impression: NORMAL MYOCARDIAL PERFUSION  1. The perfusion scan is free of evidence for myocardial ischemia or injury.   2. There is a mild intensity fixed defect in the anteroseptal wall of the left ventricle, secondary to breast attenuation.   3. Resting wall motion is physiologic.   4. Resting LV function is normal.  (normal is 55 -  "69)  5. The ventricular volumes are normal at rest and stress.   6. The extracardiac distribution of radioactivity is normal.     No problem with spironolactone, BP improved, home BP similar to office readings.    Since visit of 8/14, had some distress and home monitor showed HR of 40, felt "bad" and nervous to ED, note reviewed, ECG and final pulse count 57-58. Labs reviewed - normal renal function and K+ 3.8. Still taking one tab of K+ daily. Not using Lasix. Explained HR discrepancy may be due to VPCs. Reviewed by Ms. Fermin and Lexapro increased to 20 mg, 2 days ago. Feeling "a lot better". Sleeping better. Still sedentary but ready to be more active. Eating better have lost additional 5 lbs since 8/2014.    Since visit of 9/5, still with speech therapy, noted progressive near syncope with SOB and irregular heart beats. Also noted some ankle swelling over the past 2 weeks. ECG shows marked bradycardia, rate 48, right axis, pulmonary pattern, 1st degree AVB. Wellbutrin 100 mg daily along with Lexapro 20 mg by Ms. Fermin NP. BMP showed K+3.5. To use Lasix PRN    Since visit of 9/25, still with marked AVILA, only able to walk about 30' before having to stop. Bothered by increase palpitations during tapering of BB. No definite lung problem, evaluated this year. ECG shows sinus dewayne, rate 53, VPC, RBBB with suggestion of septal MI. No evidence for anemia - CBC 9/3/2014 was normal. Just started doing some activities with arm and leg exercise for the last 2 weeks, Doing some OT alternating with PT every other day.  CXR, 6/3/2014 - Lungs are clear of infiltrate and costophrenic sulci are sharp.  The cardiomediastinal silhouette appears stable.    PET scan, 4/2014 - 1.A hypermetabolic left pulmonary mass is noted with an SUV of 3.0 maximally.  A neoplastic, infectious, or granulomatous process is on the differential. Clinical correlation is suggested.  Close follow-up for resolution or biopsy would be suggested    2.  " "Elevated right-sided heart pressures are suspected with a large right atrium    3.  Right-sided pleural effusion    4.  Hypermetabolic thyroid gland asymmetrically on the right.  This may relate to thyroiditis, Graves' disease, or neoplastic process.  Ultrasound may be helpful.    5.  Small hypermetabolic focus in the expected location of the left ovary, a female pelvis ultrasound may be helpful for further evaluation.  This could relate to a uterine fibroid that has necrosed or infarcted    Biopsy of lung nodule on 5/1/2014 - negative for CA    CTA, 4/2014 - No evidence of pulmonary thromboembolism.    2.  Enlarged cardiac silhouette and secondary findings of elevated right heart pressures with diffuse mosaic lung pattern and right basilar pleural fluid suggesting cardiac decompensation/heart failure.    3. Unchanged 1 cm nodular density within the left upper lobe which previously demonstrated hypermetabolism by PET/CT and unchanged mediastinal lymphadenopathy.    4.  Subpleural nodular density within the right upper lobe is unchanged when compared with the previous study and could represent an area of remote fibrotic scarring.    5.  Heterogeneous appearance of the spleen, possibly due to the phase of contrast enhancement.  Evolving splenic infarction is not entirely excluded.    6. Suggestion of colonic wall thickening involving the descending colon.  Please correlate for symptoms of colitis.    Since visit of 10/14, rehospitalized for HF on 10/26 to 10/29/2014.  DCS - "Ayla Dela Cruz is a 69 y.o. female admitted to the services of hospital medicine via the ER for acute diastolic heart failure. C/o severe SOB and increase in leg swelling over the past two days. Under the care of Dr. Jurado. Recently was taken off metoprolol. History of paroxsymal atrial fib. Hospitalized here in June in this year for acute CVA. It was at that time, pt started Xarelto. Deficits has resolved. No history of heart failure. " "    Hospital Course: The patient was admitted with acute CHF biventricular and mild elevation of her troponin's at 0.029 - 0.035 and therefore an anginal equivalent was suspected. The patient also had significant hypertension upon admission and although she was not in atrial fibrillation, her EKG showed a significant first degree AV block and RBBB. Discussion was made as to whether or not to decrease the patient flecainide; however due to the risk of her going back into atrial fibrillation this was not done. Upon discharge the patient's lisinopril was increased to 10 mg and aldactone was added."    RHC done HEMODYNAMIC RESULTS:    LVEDP (Pre): 24 mmHg  BP: 166/104    Air rest:  PA: 90/34 (54)  PW:  (24)  RVEDP: 16  RA:  (16)    C. SUMMARY:    1. Diastolic dysfunction.  2. - Kee CO 2.64 L/min  3. - Mean systemic pressure 108.6 mmHG, stage II HTN  4. - SVR 41.16 Wood Units  5. - PVR 11.36 Wood Units  6. - Pulmonary HTN due to left sided heart disease and stage II HTN    D. RECOMMENDATIONS:    1. Routine post-cath care.  2. Cardiac rehab referral.  3. Follow up with Dr. Barrett Jurado.  4. - Aggressive BP lowering and diuresis    Repeat ECHO 11/5/2014 CONCLUSIONS     1 - Concentric remodeling. Septal wall thickness is increased, and posterior wall thickness is mildly increased, with the septum measuring 1.3 cm and the posterior wall measuring 1.1 cm across.    2 - Mildly to moderately depressed left ventricular systolic function (EF 40-45%).     3 - Left ventricular diastolic dysfunction. E/e'(lat) is 16    4 - Right ventricular enlargement with mildly depressed systolic function.     5 - Pulmonary hypertension. estimated PA systolic pressure is 56 mmHg.     6 - Intermediate central venous pressure.     7 - No evidence of intracardiac shunt.     8 - No significant change from Echo of 6/4/2014.    Active problems:  Progressive severe SOB, functional class IV  Diastolic dysfunction, elevated BNP and Troponin  Hypokalemia due " "to diuretics  Secondary Pulmonary HTN with peripheral edema  HTN  History of CVA 6/3/2014  PAF controlled with Flecanide  Marked bradycardia with BB  On OAC  Hematuria    At home receiving PT, OT and speech Rx twice a week, with HH nurse once a week, no problem with medications. Feeling better, sleeping well. Home /73.    Since visit of 11/14, feeling "much better", concern of occasional HTN, home BP /66-99, HR . Lot more active, no problem. Not using walker, no CP, SOB, nor dizziness. Recent BMP - normal renal function and K+ 4.1.    Since visit of 12/19/2014, worry about HTN home readings similar to office up to . Morning reading is higher. No HA nor visual abnormalities. Xanax has helped but afraid to use too much. ECG shows sinus arrhythmia, rate of 65, late transition and LAFB. Also bothered with dry cough with the Lisinopril. And started to have return of arm pains that was attributed to atorvastatin, on Crestor. Discussed options.     Since visit of 1/16/2015, noted frequent nocturia, 8-10 times, taking losartan-HCT at night time. Have stopped using Lasix and spironolactone for past 2 months. More active without much problem. Labs normal renal function, K+ 4.2, BNP now 37, A1C 5.6%.    Since visit of 2/18/2015, improving, no further dizziness, some SOB when "stressed", usually helped with alprazolam, use only prn, about 3-6 times weekly. Compliant with medications. Been off Crestor for 6 weeks with concern of muscle problem. Home BP is fine, similar to office.     Since visit of 4/15, appetite returned, feeling a lot better. Active, walking twice a week for about half an hour. Also do calisthenic about twice a week, no problem. Seeing Dr. Zavaleta for generalized pruritis for past 3 months. Cardiac wise less AVILA. Sleeping well. Labs in 5/2015, normal CBC without eosinophilia, thyroid normal, ferritin level low normal at 21. Off Crestor for couple months due to fear of muscle pain but did " "not find much difference being off medication. Last Lipid in 11/2014 was good, on daily dose of Crestor. Home /79.    Since visit of 7/2/2015, feeling "great", very active without problem, no more AVILA nor dizziness with standing. Compliant with medications, don't miss. Using Tylenol 500 mg BID for arthritis. Notice easy bruising on Xarelto. Home /10.  Holter - PREDOMINANT RHYTHM  1. Sinus arrhythmia with heart rates varying between 46 and 110 bpm with an average of 73 bpm.     VENTRICULAR ARRHYTHMIAS  1. There were very rare PVCs recorded totalling 8 and averaging less than 1 per hour.     2. There were no episodes of ventricular tachycardia.    SUPRA VENTRICULAR ARRHYTHMIAS  1. There were very frequent PACs totalling 3054 and averaging 132 per hour.  There were 29 bigeminal cycles.  There were 103 couplets.    2. 3% of complexes.    3. There were no episodes of sustained supraventricular tachycardia.    SINUS NODE FUNCTION  1. There was no evidence of high grade SA khai block.     AV CONDUCTION  1. There was no evidence of high grade AV block.     2. The longest RR interval was 1565 msec.     DIARY  1. The diary was properly completed.     2. There was 1 episode of skipping beats reported. The corresponding rhythm strips revealed the following:             During event 1 the rhythm was sinus rhythm at 75 bpm.     3. There was 1 episode of skipped beat reported. The corresponding rhythm strips revealed the following:             During event 1 the rhythm was sinus arrhythmia at 63 bpm.     MISCELLANEOUS  1. This was a tape of adequate length (23 hrs).     Since visit of 10/15, place on new antidepressant, Venlafaxine 75 mg daily, tried 2 and developed rapid HR to 125 bpm, feels more anxious, now switched to Citalopram. Also noted the daily dose of Lorazepam does not seem adequate. Orthostatic VS is positive with lying 142/73, , standing 120/62, . Been taking spironolactone 25 mg for last 3 " "days. Does feel thirsty. Labs reviewed A1C 5.8%, CBC, BMP were WNL. Lipid shows LDL 99, non-. Goal preferred is <70 and < 100. No problem with 10 mg of Crestor. Had vacation at [place] and [place], walked all over without problem.    Since visit of 1/18/2016, no new problem. 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 Labs with , non-, hsCRP at 2.56.     In 10/2016, had acute GB attack with rupture in 8/2016, then tried on Wellbutrin took only 1-2 tabs and BP up to 201/100 with head tightness. Subsequently back to normal, the last 2.5 days took Losartan bid. Discussed Rx options for HTN. Advised to be more active, regular exercise. Lab reviewed LDL in 8/2016 93.     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 Still with daily palpitations, last up to half hours. Have electronic watch, David Barroso, since 9/2016, suppose harmonize patient with the universe. Feeling better if on during the day. Lot of walking at school, no problem.   Holter in 10/2016 - PREDOMINANT RHYTHM  1. Sinus rhythm with heart rates varying between 52 and 144 bpm with an average of 75 bpm.     2. Paroxysmal atrial fibrillation    VENTRICULAR ARRHYTHMIAS  1. There were occasional mostly monomorphic PVCs totalling 596 and averaging 13 per hour.     2. There were no episodes of ventricular tachycardia.    SUPRA VENTRICULAR ARRHYTHMIAS  1. There were frequent PACs totalling 2982 and averaging 69 per hour.  There were 69 bigeminal cycles.  There were 55 couplets.    2. There were no episodes of sustained supraventricular tachycardia.    3. Paroxysmal atrial fibrillation was noted in the the study.     SINUS NODE FUNCTION  1. There was no evidence of high grade SA khai block.     AV CONDUCTION  1. There was no evidence of high grade AV khai filtering.     2. The longest RR interval was 1725 msec.     DIARY  1. The diary was returned, but not completed    MISCELLANEOUS  1. This was a tape of adequate " "length (43 hrs).     ECHO CONCLUSIONS     1 - Hyperdynamic left ventricular systolic function (EF 65-70%).     2 - Normal left ventricular diastolic function.     3 - Normal right ventricular systolic function .     4 - Pulmonary hypertension. The estimated PA systolic pressure is 64 mmHg.     5 - Less evidence for LVH from Echo in 11/2014.     In 5/2017, bothered by recurrent PAF with AVILA after 10 feet walking. Felt better before on Flecainide 100 mg bid, but Holter continued to show PAF. Attempt up titration, problem with itching, switched to propafenone 150 mg tid, continued with itching and reviewed by allergist, treated with 10 days of cortisone with benefit. No hive and no itching, just don't feel good due to the irregular rhythm. History reviewed, had 2 prior AF ablation, last in [place], [place] in around 2000, recall being told not to do again. Recall seeing an [facility] EP doc but didn't like him. Discussed various options. Will stop antiarrhythmic for now, will be going on a 16 days trip to [place]. Reviewed prior medications, have taken Tenormin, metoprolol tartrate, and short-acting diltiazem in the past without problem. Refer to Dr. Calderon for review. ECG in AF, rate 99, PRWP, LAFB    In 11/2017, post AF ablation, felt good for a few weeks, noted some SOB and had review with Dr. Calderon on 11/1 - 72 year old female with a longstanding history of atrial arrhythmias. Her [id number] Score is 5 (age, female, TIA, HTN) so she should remain on anticoagulation indefinitely. She has failed Flecainide. She is now 6 weeks post-PVI and MA flutter line, and maintaining sinus rhythm on low-dose Amiodarone. Given her intermittent AVILA which started post-ablation, I have stopped Amiodarone which I think is the likely culprit. I instructed her to continue taking Lasix PRN, as well as metoprolol and Xarelto." ECG on 11/1 was in NSR, rate 61, PRWP.  Recommended to stop amiodarone but then started to feel the palpitation " "daily, felt nervous and at the same time being taper down on the nightly Ativan. Did have some breakthrough anxiety attacks. Also had strained the upper back and right arm / shoulder, requesting some Tramadol, tried Jan's Tramadol with good relief. Understand this is an opoid. Used Excedrin with caffeine and bothered by more palpitations.    In 3/2018, here for recurrent palpitations, no inciting factors over the past 6 weeks. Had review with Dr. Calderon in 2/2018 - "72 year old female with a longstanding history of atrial arrhythmias and prior ablations at outside institutions. Her [id number] Score is 5 (age, female, TIA, HTN) so she should remain on anticoagulation indefinitely. She is now 5 months post-PVI and MA flutter line, and maintaining sinus rhythm off Amiodarone. The plan is to continue Xarelto, and to see me again in 6 months." Palpitations appears associated with AVILA, had difficulties walking in house or up a ramp. Started 100 mg of amiodarone last week, daily, feels some benefit. ECG today in Sinus rhythm vs wandering atrial pacemaker with frequent PJC, rate 59. Also taking Losartan in the morning appears more effective.  Holter 11/2017 - PREDOMINANT RHYTHM  1. Sinus arrhythmia with heart rates varying between 39 and 102 bpm with an average of 58 bpm.     VENTRICULAR ARRHYTHMIAS  1. There were occasional PVCs totalling 728 and averaging 15 per hour.  There were 5 bigeminal cycles.     2. There were no episodes of ventricular tachycardia.    SUPRA VENTRICULAR ARRHYTHMIAS  1. There was no supraventricular ectopic activity.    SINUS NODE FUNCTION  1. There was no evidence of high grade SA khai block.     AV CONDUCTION  1. There was no evidence of high grade AV block.     2. The longest RR interval was 1820 msec.     DIARY  1. The diary was not returned    MISCELLANEOUS  1. There were occasional hookup related artifacts. Overall, the study was of good quality.     2. This was a tape of adequate length " "(48 hrs).     in 5/2018, no new problem, still concern of AVILA, usually with walking, variable as little 10 feet or fine with some long walk, can play flute for an hour but find difficulties in holding a note. Off daily amiodarone, uses it prn for palpitation, find helpful. Labs reviewed from 1/2018, TSH, Vitamin D, LDL 68.2, A1C 5.9%, CMP - normal renal function, K+ 3.6. To go to [place] for a month in 8/2018.    In 9/2018, return from a month family reunion trip to [place]. Problem with hypotension with Tramadol and Losartan. Had review with Dr. Calderon on 9/5 "Ayla Dela Cruz is a 73 year old female with a longstanding history of atrial arrhythmias and prior ablations at outside institutions. Her [id number] Score is 5 (age, female, TIA, HTN) so she should remain on anticoagulation indefinitely. She is now 11 months post-PVI and MA flutter line, and maintaining sinus rhythm off Amiodarone. However, she is feeling poorly, with frequent PACs and borderline hypotension. The plan is to stop Losartan, echo in next several weeks, Holter monitor in 6 weeks, and follow-up with me in 8 weeks."  Off both medication on 8/31, no problem now. No heart worry. Denies any CP nor SOB. Still teaching. ECG on 9/5 shows NSR with sinus arrhythmia.    In 3/2019, return for follow up, had syncopal spell with hypotension at [facility] required reconstruction of the right ankle, now in PT, doing well. No heart complication, no AF. LDL 90.6 in 1/2019. 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 No heart worries, no CP nor SOB, much better, breathing improved with daily Breo use. Discussed option with NOAC.    In 9/2019, 6 months review, concern of bruising with Xarelto, recall problem with dark stool with Eliquis. Discussed option.     In 1/2020 return due to allergic reaction to Pradaxa, had to stop Eliquis for similar problems - hives and rash, difficult to sleep. Also problem with embolism when off NOAC for 7 days. " "Discuss alternatives.    In 3/2020, same problem with Savaysa, noted about an hour of PAF this morning, felt nervous. Troubled by recent viral infection and also pain injection with steroid, which helped the rash temporary. Would like to proceed with mechanical alternative. History include past use of Coumadin for about 2 years, had some difficult with GIB and also required up to 3 times a week testing.    In 8/2020, here for 6-months review. Troubled more with JOSE with quarantine. Working with therapist. Keeping busy with working on a new book, music also. No heart worries.    In 8/2021, return for annual review. Problem with requiring medication changes for major depression with anorexia and malnutrition. Weight loss of 20 lbs and now improving. No cardiac issue except for slow heart rate down to 52 bpm, asymptomatic.    Fco Calderon MD noted "75 year old female with a longstanding history of atrial arrhythmias and prior ablations at outside institutions. Her [id number] Score is 5 (age, female, TIA, HTN) so she should remain on anticoagulation indefinitely. She is now >3 years post-PVI and MA flutter line, and is maintaining sinus rhythm off Amiodarone.      Ms. Dela Cruz had a likely cardioembolic event after stopping AC for a week. Ms. Dela Cruz wants to be on an anticoagulant she can crush. I have switched her back to eliquis 5 mg bid. She thinks her pruritis was from [employer] laundry detergent and not eliquis.     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 I have told her to contact Dr. Pedraza if she decides she can no longer take anticoagulants so she can discuss details of procedure with him further."    In 1/2022, return for follow up. Post PPM in 10/2021, one episode of near syncope with " head injury, no clear etiology, no problem over the past 2 months. Noted some morning dizziness with  to 110 and HR in the 80s.. Not as active as before and also noted some cognitive dysfunction with loss of ID. No cardiac complaints. Medication reviewed, on low-dose BB for PAF rate control.    Echo 2/2021 - Mild concentric hypertrophy and normal systolic function. The estimated ejection fraction is 60%  There is no evidence of intracardiac shunting.  Small circumferential pericardial effusion.  Mild aortic regurgitation.  Moderate tricuspid regurgitation.  Mild to moderate pulmonic regurgitation.  There is moderate tricuspid valve stenosis.  Moderate right atrial enlargement.  Normal right ventricular size.  Mild mitral regurgitation.  Mild left atrial enlargement.  Intermediate central venous pressure (8 mmHg).  The estimated PA systolic pressure is 53 mmHg.  There is pulmonary hypertension.  Normal left ventricular diastolic function.    Lexiscan 9/2021 - Impression:     1.  Scintigraphically negative for ischemia.  2. Fixed defect along the septal wall is suspicious for a small infarct.  3. The global left ventricular systolic function is normal with an LV ejection fraction of 80 % and no evidence of LV dilatation.  4. Wall motion is normal.    In 7/2022, return post eye operation, unable to be active due to vision, now able to see. Very weak and frail due to anorexia and sedentary status. Noted some heart racing for past 2 days. Back to NSR today. Under nutritional review.    In 12/2022, return for 6-months follow-up. Noted some AMS with standing and lower BP, now weaning off amitriptyline. Also working with eating disorder and is eating better. Working with new Psychiatrist. Discussed pulmonary hypertension, too frail for medications.      Echo 9/2022 - The left ventricle is normal in size with moderate concentric hypertrophy and normal systolic function.  The estimated ejection fraction is  "60%.  Indeterminate left ventricular diastolic function.  Normal right ventricular size with normal right ventricular systolic function.  Mild left atrial enlargement.  Moderate right atrial enlargement.  Mild aortic regurgitation.  Mild mitral regurgitation.  Moderate tricuspid regurgitation.  Mild pulmonic regurgitation.  Normal central venous pressure (3 mmHg).  The estimated PA systolic pressure is 60 mmHg.  There is pulmonary hypertension.    PPM check 11/2022 - RA pacing 32%, RV pacing 7.9%   Atrial arrhythmias: AMS [id number]--[id number] at 2m 40s w/egms c/w afib/CVR   AT/AF burden: <1%  Battery status/longevity (estimated): 8.2-10 years      In 6/2023, doing as well as she can, kept weight stable with Boost Max. No heart worries.    Fco Calderon MD noted 3/2023 "76 year old female with a longstanding history of atrial arrhythmias and prior ablations at outside institutions. Her [id number] Score is 5 (age, female, TIA, HTN) so she should remain on anticoagulation indefinitely. She is now 4 years post-PVI and MA flutter line, and was maintaining sinus rhythm off Amiodarone until 9/2021. She is now status-post dual chamber pacemaker implantation. AMS burden <1%     Plan is to hold the course and see me again in 1 year. Should she have increased AF burden the plan will be to start sotalol 40 mg bid.     Ms. Dela Cruz had a likely cardioembolic event after stopping AC for a week. Given her history of CVA of unclear etiology but possibly cardioembolic, I think the best course is for her to continue anticoagulation indefinitely, and only consider a Watchman device should she ever develop an absolute contraindication to oral anticoagulation."    PPM check 5/2023 - Presenting egram demonstrates Ap/Vs w/ PAC's   Device function WNL   Autocapture algorithms are RA, RV (OFF).   RA pace 77%, RV pacing 2%   Atrial arrhythmias: AMS x 16, MAX 23 mins 16 sec, Overall burden <1% Overall VRs controlled   Anticoagulation status: " "Eliquis   Ventricular arrhythmias: None   Battery status/longevity: 7.7-9 years   Return to clinic in May 2024     UA LE arterials 3/2023 - No elevated peak systolic velocities suggesting hemodynamically significant narrowing.  Normal GODFREY on the right.  GODFREY on the left is borderline normal.     In 12/202, return for 3-months review. Concern of labile HR from 69 to 107 accompanied by anxiety and panic. Helped with Ativan. Galo Lipscomb, PhD, MP suggest use of propranolol for her HR.     PPM check 8/2023 - Presenting egram demonstrates ASVS   Device function WNL   RA pace 49%, RV pacing 1.5%   Autocapture algorithms are Off   Atrial arrhythmias:  AF burden <1%, max duration 37 minutes.   Anticoagulation status:  Eliquis   Ventricular arrhythmias:  NONE   Battery status/longevity:  7.6-9.2 yrs    Yesenia Duron, NP, EP noted 10/5/2023, virtual visit.   78 y.o. female with HTN, HLD, TIA, AFL (RFA 1990), AF (PVI 1997, PVI 2017), renal infarction, PPM with a telemedicine visit for follow up.   Pt experiencing irreg HB, palps, SOB ~ 2 weeks ago which have improved. No recent alerts from device and remote monitor is functional. Last report shows low (<1%) AF burden. 49% AP, <1% .  Discussed her symptoms could be brief AF vs PMT vs RR vs ectopy. If they recur would recommend 48hr Holter to evaluate symptom-rhythm correlation. She has a [facility].     RTC as previously scheduled in March 2024 in [place]."    In 7/2024, return for 6-months review. More SOB recently with abnormal CXR and started on diuretic by Lars Bryson MD with definite improvement. No other CV issues, remains mostly sedentary but enjoying living.    CXR 5/2024 - Increased size of the heart with cardiomegaly. Pacemaker in place. Increased bibasilar lung density may represent atelectasis or a pneumonitis. Trace left pleural effusion.    76 year old female with a longstanding history of atrial arrhythmias and prior ablations " "at outside institutions. Her [id number] Score is 5 (age, female, TIA, HTN) so she should remain on anticoagulation indefinitely (eliquis 2.5 mg bid). She is now 4 years post-PVI and MA flutter line, and was maintaining sinus rhythm off Amiodarone until 9/2021. She is now status-post dual chamber pacemaker implantation. AMS burden <1%     Plan is to hold the course and see me again in 1 year. Should she have increased AF burden the plan will be to start sotalol 40 mg bid.     Ms. Dela Cruz had a likely cardioembolic event after stopping AC for a week. 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     Fco Calderon MD noted 3/2024 "76 year old female with a longstanding history of atrial arrhythmias and prior ablations at outside institutions. Her [id number] Score is 5 (age, female, TIA, HTN) so she should remain on anticoagulation indefinitely (eliquis 2.5 mg bid). She is now 4 years post-PVI and MA flutter line, and was maintaining sinus rhythm off Amiodarone until 9/2021. She is now status-post dual chamber pacemaker implantation. AMS burden <1%     Plan is to hold the course and see me again in 1 year. Should she have increased AF burden the plan will be to start sotalol 40 mg bid.     Ms. Dela Cruz had a likely cardioembolic event after stopping AC for a week. 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     HPI comments: in 1/2025, post hospitalization for another TIA. Doing her usual now, still somewhat sedentary but doing laundry and making the beds and some house work, no " "problem. Had some PT.  Trouble with chronic itching.    PPM device check 1/2025 - Battery Longevity: 6 yr 8 mo Battery Status: OK AT/AF [place]: 0 % AP: 70.0 % RVp: 34.0 %    Echo 7/2024 - Left Ventricle: The left ventricle is normal in size. Normal wall thickness. Normal wall motion. Septal motion is consistent with pacing. There is normal systolic function with a visually estimated ejection fraction of 60 - 65%. There is normal diastolic function.    Right Ventricle: Normal right ventricular cavity size. Wall thickness is normal. Systolic function is normal. Pacemaker lead present in the ventricle.    Right Atrium: Lead present in the right atrium.    Aortic Valve: The aortic valve is a trileaflet valve. There is aortic valve sclerosis. There is mild aortic regurgitation.    Mitral Valve: There is mild regurgitation.    Tricuspid Valve: There is moderate regurgitation. The estimated PA systolic pressure is at least 45 mmHg.    Pulmonic Valve: There is mild regurgitation.    IVC/SVC: Normal venous pressure at 3 mmHg.    DCS 10/1/2024 "Hospital Course:   Patient admitted for further management, left facial numbness has resolved since admission, CTA of head and neck negative for large vessel occlusion, CT head is negative for acute changes.  Repeat CT head  24 hours later negative for acute changes.  Patient is on aspirin and low-dose Eliquis for stroke prophylaxis for Afib.  Evaluated by neurologist, most likely patient has TIA versus acute CVA.  Unable to do MRI since patient has pacemaker.  Patient is cleared for discharge patient neurologic deficits has been resolved.      ROS     Constitutional: negative for chills, have chronic fatigue, no fevers, sweats, no further slurred speech, and no dysarthria, have improved appetite with JOSE, intolerance to heat, bruises easily on NOAC and steroid, weight gained 5 lbs from 7/2024, diet  Eyes: negative for icterus, redness and visual disturbance, have visual halo, "   Respiratory: negative for asthma, cough have resolved off ACE-I, no dyspnea on exertion, SOB with HR 85 to 100 bpm, have lifelong snoring, Bedford score 6 improved down to 0 on CPAP, 86%, no hemoptysis, pleurisy/chest pain and wheezing  Cardiovascular: negative for chest pain, chest pressure/discomfort, no further orthostatic dizziness, and still some palpitations but have irregular heart beats, no paroxysmal nocturnal dyspnea and syncope  Gastrointestinal: negative for abdominal pain, nausea and vomiting, no further GERD and dysphagia (psychologic), have hemorrhoids  Musculoskeletal:positive for arthralgias, and less right sided muscle weakness with improvement in leg strength, improved bilateral ankle pains - OA and no myalgias  Neurological: negative for coordination problems, gait problems, headaches, paresthesia, have some tremors but able to draw, loss of voice at times, and no vertigo  Psych: positive for depression, nervousness, anxiety, less stressed due to 's have improved  Skin - again pruritic rash, have dryness of the skin    Answers submitted by the patient for this visit:  Review of Symptoms (Submitted on 12/31/2024)  weight loss: No  Weight Gain?: No  Appetite Loss?: No  chills: No  fever: No  weakness: No  Fatigue (Tiredness)?: No  Sweats?: No  headaches: No  neck pain: No  hearing loss: No  congestion: No  sore throat: No  hoarse voice: No  eye pain: No  Double Vision?: No  cough: No  sputum production: No  shortness of breath: Yes  Snoring?: Yes  Sleep disturbances due to breathing?: Yes  wheezing: No  chest pain: No  chest tightness: No  palpitations: No  Shortness of breath with activity?: Yes  Difficulty breathing when lying down?: No  PND: No  Leg Swelling?: No  Trouble Swallowing?: No  heartburn: No  nausea: No  change in bowel habit: No  constipation: No  diarrhea: No  Yellow eyes or skin?: No  melena: No  hematochezia: No  bladder incontinence: No  frequency: No  dysuria:  "No  hematuria: No  hesitancy: No  dizziness: No  light-headedness: No  seizures: No  numbness: No  paresthesias: No  syncope: No  Falls?: No  Bruises or bleeds easily: Yes  Bleeding?: Yes  cold intolerance: No  heat intolerance: No  sweating: No  polydipsia: No  polyuria: No  nervous/ anxious: No  Feeling depressed?: Yes  memory loss: Yes    Objective:    Physical Exam - orthostatic VS: sitting 112/54, standing 115/58    General appearance: alert, appears stated age, cooperative and no distress, improved skin turgor, RA O2 sat. 100%  Head: Normocephalic, without obvious abnormality, atraumatic  Eyes:  conjunctivae/corneas clear. PERRL, EOM's intact.    Neck: no adenopathy, no carotid bruit, no JVD, supple, symmetrical, trachea midline and thyroid not enlarged, symmetric, no tenderness/mass/nodules  Lungs:  clear to auscultation bilaterally  Chest wall: no tenderness  Heart: regular rate and rhythm, normal S1, S2 normal, click, rub or gallop    Abdomen: soft, non-tender; bowel sounds normal; no masses,  no organomegaly, waist 31" hip 42"  Extremities: extremities no further weakness of the right upper extremity, atraumatic, no cyanosis and have no edema, minor varicose veins  Pulses: 2+ and symmetric    Assessment:       1. TIA (transient ischemic attack), 11/3/2014, 10/2024    2. Paroxysmal atrial fibrillation, ablations X 3 1995, 1997, 9/2017, CHADS-VAS score 5, HAS-BLED score 5     3. Chronic diastolic heart failure    4. Anticoagulant long-term use    5. Cardiac pacemaker in situ, [employer], dual chamber, 10/14/2021    6. Sedentary lifestyle, 6/2014    7. Iron deficiency anemia due to chronic blood loss    8. Hypokalemia    9. Prerenal azotemia    10. Stage 3a chronic kidney disease    11. Benzodiazepine dependence, Ativan 0.5 mg daily since 2021    12. Elevated brain natriuretic peptide (BNP) level, range 98 - 1045    13. At risk for cardiovascular event    14. Pure hypercholesterolemia    15. Chronic " pruritus, onset 3/2015    16. AVILA (dyspnea on exertion)         Plan:       Ayla was seen today for follow-up.    Diagnoses and associated orders for this visit:    TIA (transient ischemic attack), 11/3/2014, 10/2024  -     IN OFFICE EKG 12-LEAD (to [facility])  -     rosuvastatin (CRESTOR) 10 MG tablet; Take 1 tablet (10 mg total) by mouth once daily.  Dispense: 90 tablet; Refill: 3  -     Lipid Panel; Future; Expected date: 04/06/2025  -     CRP, High Sensitivity; Future; Expected date: 04/06/2025    Paroxysmal atrial fibrillation, ablations X 3 1995, 1997, 9/2017, CHADS-VAS score 5, HAS-BLED score 5   -     IN OFFICE EKG 12-LEAD (to [facility])    Chronic diastolic heart failure    Anticoagulant long-term use    Cardiac pacemaker in situ, [employer], dual chamber, 10/14/2021    Sedentary lifestyle, 6/2014    Iron deficiency anemia due to chronic blood loss    Hypokalemia    Prerenal azotemia    Stage 3a chronic kidney disease    Benzodiazepine dependence, Ativan 0.5 mg daily since 2021    Elevated brain natriuretic peptide (BNP) level, range 98 - 1045    At risk for cardiovascular event  -     rosuvastatin (CRESTOR) 10 MG tablet; Take 1 tablet (10 mg total) by mouth once daily.  Dispense: 90 tablet; Refill: 3  -     Lipid Panel; Future; Expected date: 04/06/2025    Pure hypercholesterolemia  -     rosuvastatin (CRESTOR) 10 MG tablet; Take 1 tablet (10 mg total) by mouth once daily.  Dispense: 90 tablet; Refill: 3  -     Lipid Panel; Future; Expected date: 04/06/2025  -     CRP, High Sensitivity; Future; Expected date: 04/06/2025    Chronic pruritus, onset 3/2015    AVILA (dyspnea on exertion)    - All medical issues reviewed, need to retry statin due to high ASCVD risks.  - Warning signs of MI and stroke given, if symptoms last more than 5 minutes, stop immediately and call 911, then chew 2-4 low-dose ASA (81 mg).  - Consider use of Potassium chloride salt substitute, [place] Nu-Salt.   - Need to remain well hydrated  but avoid drinking within 4 hours of bedtime. Recommend at least 90 oz of fluid daily per IOM (institute of Medicine) suggestion.  - CV status and off antiarrhythmic, all medications reviewed, patient acknowledge good understanding.  - Recommend healthy living: healthy diet and regular exercise aiming for fitness, restorative sleep and weight control  - Recommend using Tylenol as first line pain medications.  - Instruction for Mediterranean, high potassium diet and heart healthy exercise given.  - Need good exercise program, 4 key elements: 1. Aerobic (walking, swimming, dancing, jogging, biking, etc, 2. Muscle strengthening / resistance exercise, need to do 2-3 times weekly, 3. Stretching daily, good stretch takes a whole  total minute. 4. Balance exercise daily.  - Encourage activities as much as tolerated. Any activity is better than none!   - Highly recommend 30-60 minutes of exercise daily, can have Sunday off, with 2-3 sessions of muscle strengthening weekly. A  would be very helpful.  - Recommend at least biannual cardiovascular evaluation in view of her significant risk factors, patient 's preference.  - Need to go visit [place], 2 weeks to 3 months.    Patient Active Problem List   Diagnosis    Paroxysmal atrial fibrillation, ablations X 3 1995, 1997, 9/2017, CHADS-VAS score 5, HAS-BLED score 5     Hypertension, 1985    Hyperlipidemia, baseline     GERD (gastroesophageal reflux disease)    Glaucoma (increased eye pressure)    Fibroid uterus    Thickened endometrium    Abnormal CT scan, chest    Interstitial lung disease    Pulmonary hypertension, without RV dysfunction    H/O: CVA (cerebrovascular accident), June 2014    LVH (left ventricular hypertrophy) due to hypertensive disease    Sedentary lifestyle, 6/2014    AVILA (dyspnea on exertion)    OA (osteoarthritis)    Chronic diastolic heart failure, 2014    Elevated brain natriuretic peptide (BNP) level, range 98 - 1045     Microalbuminuria    Hypoalbuminemia due to protein-calorie malnutrition    TIA (transient ischemic attack), 11/3/2014, 10/2024    Hypokalemia    Chronic pruritus, onset 3/2015    S/P ablation of atrial flutter    JOSE (generalized anxiety disorder)    Other spondylosis, lumbar region    Cervical spondylosis    Medication intolerance    Anticoagulant long-term use    GERD (gastroesophageal reflux disease)    Mild intermittent asthma without complication    Elevated troponin    BMI less than 19,adult    Localized hives    Elevated LFTs    Iron deficiency anemia due to chronic blood loss    Recurrent major depressive disorder    Gastroenteritis    Other spondylosis, cervical region    History of DVT (deep vein thrombosis)    Chronic sinus complaints    Second degree AV block, Mobitz type I    Anemia    Syncope    Protein calorie malnutrition    At risk for cardiovascular event    Atrial fibrillation    Long term (current) use of anticoagulants    Atrial arrhythmia    Cardiac pacemaker in situ, [employer], dual chamber, 10/14/2021    Cognitive impairment    Frail elderly    Dehydration, moderate    Hyphema, right eye    Primary open angle glaucoma (POAG) of both eyes, indeterminate stage    Pseudophakia of both eyes    Chronic fatigue    Anorexia nervosa    Benzodiazepine dependence, Ativan 0.5 mg daily since 2021    Panic attacks, onset 6/2023    NILSA on CPAP, using 53% to 70%    Dysphagia    Heart failure with preserved ejection fraction    Dyspnea    Nodular thyroid disease    Prerenal azotemia    Stage 3a chronic kidney disease     Total time spend including review of record prior to face-to-face visit is 40 minutes. Greater than 50% of the time was spent in counseling and coordination of care. The above assessment and plan have been discussed at length. Referring provider's note reviewed. Labs and procedure over the last 6 months reviewed. Problem List updated. Asked to bring in all active medications / pills  bottles with next visit. Will send note to referring / PCP.

## 2025-01-07 LAB
OHS QRS DURATION: 110 MS
OHS QTC CALCULATION: 482 MS

## 2025-01-21 NOTE — PROGRESS NOTES
The patient location is:  [place], [place]  The patient location [place] is: [facility]  The patient phone number is: [phone]  Visit type: Virtual visit with synchronous audio and video  Each patient to whom he or she provides medical services by telemedicine is:  (1) informed of the relationship between the physician and patient and the respective role of any other health care provider with respect to management of the patient; and (2) notified that he or she may decline to receive medical services by telemedicine and may withdraw from such care at any time.     Outpatient Psychiatry Follow-Up Visit    Clinical Status of Patient: Outpatient (Ambulatory)  01/21/2025     Chief Complaint: 78 y/o female presenting today with  for a follow-up.       Interval History and Content of Current Session:  Interim Events/Subjective Report/Content of Current Session: 78 y/o female follow-up appointment.    Pt is a 78 y/o female with past psychiatric hx of depression, anxiety, eating disorder who presents for follow-up treatment. Pt reported that she is continues to do well. Has been reducing frequency of lorazepam.  had quadruple bypass surgery. Pt is using CPAP machine consistently and sleeping well. Continues to eat without difficulty. Denies any dizziness.     Past Psychiatric hx: remeron, rexulti, vraylar, Limbitrol, venlafaxine, fluoxetine, Abilify, gabapentin, amitriptyline, buspirone, cannot remember others.     Past Medical hx:   Past Medical History:   Diagnosis Date    Anticoagulant long-term use     Anxiety     Arthritis     Atrial fibrillation     Basal cell carcinoma     Cancer     skin    CHF (congestive heart failure)     Depression     DVT (deep venous thrombosis)     GERD (gastroesophageal reflux disease)     Glaucoma (increased eye pressure)     Hyperlipidemia     diet controlled    Hypertension     Interstitial lung disease     Localized hives 01/10/2020    Mild persistent asthma without  complication 11/12/2018    Pacemaker     Pneumonia 01/31/2014    Stroke 06/03/2014    Stroke     TIA (transient ischemic attack)     TIA (transient ischemic attack)         Interim hx:  Medication changes last visit: none  Anxiety: decrease  Depression: decrease     Denies suicidal/homicidal ideations.  Denies hopelessness/worthlessness.    Denies auditory/visual hallucinations      Alcohol: Infrequent use  Drug: Pt denied  Caffeine: Not assessed  Tobacco: Pt denied      Review of Systems   PSYCHIATRIC: Pertinent items are noted in the narrative.        CONSTITUTIONAL: weight stable    Past Medical, Family and Social History: The patient's past medical, family and social history have been reviewed and updated as appropriate within the electronic medical record. See encounter notes.     Current Psychiatric Medication:  ativan 0.5 mg PRN, buspirone 10 mg TID, trintillex 20 mg, mirtazapine 7.5 mg      Compliance: yes      Side effects: Pt denied     Risk Parameters:  Patient reports no suicidal ideation  Patient reports no homicidal ideation  Patient reports no self-injurious behavior  Patient reports no violent behavior     Exam (detailed: at least 9 elements; comprehensive: all 15 elements)   Constitutional  Vitals:  Most recent vital signs, dated less than 90 days prior to this appointment, were reviewed. BP: ()/()   Arterial Line BP: ()/()       General:  unremarkable, age appropriate, casual attire, good eye contact, good rapport       Musculoskeletal  Muscle Strength/Tone:  no flaccidity, no tremor    Gait & Station:  normal      Psychiatric                       Speech:  normal tone, normal rate, rhythm, and volume   Mood & Affect:   Mildly anxious         Thought Process:   Goal directed; Linear    Associations:   intact   Thought Content:   No SI/HI, delusions, or paranoia, no AV/VH   Insight & Judgement:   Good, adequate to circumstances   Orientation:   grossly intact; alert and oriented x 4    Memory:   intact for content of interview    Language:  grossly intact, can repeat    Attention Span  : Grossly intact for content of interview   Fund of Knowledge:   intact and appropriate to age and level of education        Assessment and Diagnosis   Status/Progress: Based on the examination today, the patient's problem(s) is/are adequately controlled.  New problems have not been presented today. Co-morbidities are not complicating management of the primary condition. There are no active rule-out diagnoses for this patient at this time.      Impression: Pt continues to respond well to medication plan. Pt has returned to reducing frequency of lorazepam without difficulty. Encouraged pt to continue doing so. Will continue with medication plan and monitor symptoms moving forward. [place]  checked    Diagnosis:   1) Major Depressive Disorder, recurrent, moderate  2) Generalized Anxiety Disorder  3) Specific Phobia  4) Panic Disorder   5) Personality Disorder traits  6) R/o Dementia of unknown etiology  Intervention/Counseling/Treatment Plan   Medication Management:      1. ativan 0.5 mg PRN    2. buspirone to 10 mg TID    3. trintillex 20 mg    4. mirtazapine 7.5 mg     5. Call to report any worsening of symptoms or problems with the medication. Pt instructed to go to ER with thoughts of harming self, others     6. Cont in therapy as needed    Psychotherapy: none  Target symptoms: anxiety  Why chosen therapy is appropriate versus another modality: CBT used; relevant to diagnosis, patient responds to this modality  Outcome monitoring methods: self-report, observation  Therapeutic intervention type: Cognitive Behavioral Therapy, Coping  Topics discussed/themes: building skills sets for symptom management, symptom recognition, nutrition, exercise  The patient's response to the intervention is accepting  Patient's response to treatment is: good.   The patient's progress toward treatment goals: limited to fair     Return to clinic: 3  months    -Cognitive-Behavioral/Supportive therapy and psychoeducation provided  -R/B/SE's of medications discussed with the pt who expresses understanding and chooses to take medications as prescribed.   -Pt instructed to call clinic, 911 or go to nearest emergency room if sxs worsen or pt is in   crisis. The pt expresses understanding.    Galo Lipscomb, PhD, MP    Visit today included increased complexity associated with the care of the episodic problem depression, anxiety addressed and managing the longitudinal care of the patient due to the serious and/or complex managed problem(s) depression, anxiety.      Antidepressant/Antianxiety Medication Initiation:  Patient informed of risks, benefits, and potential side effects of medication and accepts informed consent.  Common side effects include nausea, fatigue, headache, insomnia., Specifically discussed the possibility of new or worsening suicidal thoughts/depression.  Patient instructed to stop the medication immediately and seek urgent treatment if this occurs. Patient instructed not to abruptly discontinue medication without physician guidance except in cases of sudden onset or worsening of SI.       Stimulant Medication Initiation:  Patient advised of risks, benefits, and side effects of medication and accepts informed consent.  Common side effects include insomnia, irritability, jittery feeling, dry mouth, and agitation/hostility., Patient advised of potential addictive nature of medication and controlled substance classification.  Instructed to safeguard medication as no early refills can be given for lost or stolen medications.       Benzodiazepine Initiation:  Patient advised of the risks, benefits, and common side effects of medication and has accepted informed consent.  Common side effects include drowsiness, impaired coordination, possible memory loss., Patient advised NOT to operate a vehicle or machinery untiil they are sure how the medication will  affec them.  Client also advised of danger of mixing this medication with alcohol., Patient advised of potential addictive nature of medication and need to safeguard medication as no early refills for lost or stolen medications can be authorized.

## 2025-01-22 ENCOUNTER — OFFICE VISIT (OUTPATIENT)
Dept: PSYCHIATRY | Facility: CLINIC | Age: 80
End: 2025-01-22
Payer: MEDICARE

## 2025-01-22 ENCOUNTER — TELEPHONE (OUTPATIENT)
Dept: DERMATOLOGY | Facility: CLINIC | Age: 80
End: 2025-01-22
Payer: MEDICARE

## 2025-01-22 ENCOUNTER — PATIENT MESSAGE (OUTPATIENT)
Dept: DERMATOLOGY | Facility: CLINIC | Age: 80
End: 2025-01-22
Payer: MEDICARE

## 2025-01-22 DIAGNOSIS — F41.1 GENERALIZED ANXIETY DISORDER: ICD-10-CM

## 2025-01-22 DIAGNOSIS — F33.1 MAJOR DEPRESSIVE DISORDER, RECURRENT, MODERATE: Primary | ICD-10-CM

## 2025-01-22 DIAGNOSIS — F41.0 PANIC DISORDER: ICD-10-CM

## 2025-01-22 DIAGNOSIS — F40.298 SPECIFIC PHOBIA: ICD-10-CM

## 2025-01-22 RX ORDER — VORTIOXETINE 20 MG/1
20 TABLET, FILM COATED ORAL DAILY
Qty: 90 TABLET | Refills: 0 | Status: SHIPPED | OUTPATIENT
Start: 2025-01-22

## 2025-01-22 RX ORDER — LORAZEPAM 0.5 MG/1
0.5 TABLET ORAL DAILY PRN
Qty: 30 TABLET | Refills: 0 | Status: SHIPPED | OUTPATIENT
Start: 2025-01-22

## 2025-01-23 ENCOUNTER — TELEPHONE (OUTPATIENT)
Dept: PSYCHIATRY | Facility: CLINIC | Age: 80
End: 2025-01-23
Payer: MEDICARE

## 2025-01-23 NOTE — TELEPHONE ENCOUNTER
----- Message from Galo Lipscomb sent at 1/22/2025  1:15 PM CST -----  Regarding: apt  Pt needs a f/u in-person visit in 3 months. GARY

## 2025-01-24 ENCOUNTER — OFFICE VISIT (OUTPATIENT)
Dept: DERMATOLOGY | Facility: CLINIC | Age: 80
End: 2025-01-24
Payer: MEDICARE

## 2025-01-24 VITALS — BODY MASS INDEX: 18.13 KG/M2 | HEIGHT: 67 IN | WEIGHT: 115.5 LBS

## 2025-01-24 DIAGNOSIS — L98.9 DISEASE OF SKIN AND SUBCUTANEOUS TISSUE: Primary | ICD-10-CM

## 2025-01-24 DIAGNOSIS — L29.9 PRURITUS: ICD-10-CM

## 2025-01-24 PROCEDURE — 11104 PUNCH BX SKIN SINGLE LESION: CPT | Mod: S$GLB,,, | Performed by: DERMATOLOGY

## 2025-01-24 PROCEDURE — 99214 OFFICE O/P EST MOD 30 MIN: CPT | Mod: 25,S$GLB,, | Performed by: DERMATOLOGY

## 2025-01-24 PROCEDURE — 11103 TANGNTL BX SKIN EA SEP/ADDL: CPT | Mod: S$GLB,,, | Performed by: DERMATOLOGY

## 2025-01-24 PROCEDURE — 88305 TISSUE EXAM BY PATHOLOGIST: CPT | Mod: 59 | Performed by: PATHOLOGY

## 2025-01-24 PROCEDURE — 11105 PUNCH BX SKIN EA SEP/ADDL: CPT | Mod: S$GLB,,, | Performed by: DERMATOLOGY

## 2025-01-24 PROCEDURE — 1159F MED LIST DOCD IN RCRD: CPT | Mod: CPTII,S$GLB,, | Performed by: DERMATOLOGY

## 2025-01-24 PROCEDURE — 1157F ADVNC CARE PLAN IN RCRD: CPT | Mod: CPTII,S$GLB,, | Performed by: DERMATOLOGY

## 2025-01-24 PROCEDURE — 88305 TISSUE EXAM BY PATHOLOGIST: CPT | Mod: 26,,, | Performed by: PATHOLOGY

## 2025-01-24 PROCEDURE — 1101F PT FALLS ASSESS-DOCD LE1/YR: CPT | Mod: CPTII,S$GLB,, | Performed by: DERMATOLOGY

## 2025-01-24 PROCEDURE — 1160F RVW MEDS BY RX/DR IN RCRD: CPT | Mod: CPTII,S$GLB,, | Performed by: DERMATOLOGY

## 2025-01-24 PROCEDURE — 3288F FALL RISK ASSESSMENT DOCD: CPT | Mod: CPTII,S$GLB,, | Performed by: DERMATOLOGY

## 2025-01-24 RX ORDER — TRIAMCINOLONE ACETONIDE 1 MG/G
CREAM TOPICAL
Qty: 454 G | Refills: 3 | Status: SHIPPED | OUTPATIENT
Start: 2025-01-24

## 2025-01-24 NOTE — PROGRESS NOTES
Subjective:      Patient ID:  Ayla Dela Cruz is a 79 y.o. female who presents for   Chief Complaint   Patient presents with    Rash     LOV 9/5/24 AK, SK, Angioma, Prurigo, Benign Nevi    Patient here today for spreading rash to body x 1 month.   Pt states rash comes and goes, applying Triamcinolone, no resolution.  Rash was itchy, itch has since improved  Applying triamcinolone    All meds long term except   - ezetimibe - started 10/2024  - propranol (switch from metoprolol x decades, heart issues, better controlled with different beta blocker)  - crestor restarted 01/2025 after several years off    Pt denies any changes in detergents, lotions or body washes.   Washing with Sensitive Skin products.   Free and clear detergent  Cetaphil cleanser (past [place] and dial)  Cetaphil lotion    No hospital stay, no hotel stay  Daughter visited for thanksgiving  Husbands skin is fine    **Pacemaker**     Derm Hx:  BCC left upper lip- 10/2022 , MOHs with  01/2023  BCC left upper back- 10/2022 , E&S  BCC right angle of mandible E&s 12/2023  Denies Fhx of MM     Current Outpatient Medications:   ·  apixaban (ELIQUIS) 2.5 mg Tab, Take 1 tablet (2.5 mg total) by mouth 2 (two) times daily., Disp: 180 tablet, Rfl: 3  ·  aspirin 81 MG Chew, Take 81 mg by mouth once daily., Disp: , Rfl:   ·  atropine 1% (ISOPTO ATROPINE) 1 % Drop, PLACE 1 DROP INTO THE RIGHT EYE 2 TIMES A DAY., Disp: 5 mL, Rfl: 6  ·  azelastine (ASTELIN) 137 mcg (0.1 %) nasal spray, 1 spray (137 mcg total) by Nasal route 2 (two) times daily., Disp: 30 mL, Rfl: 5  ·  busPIRone (BUSPAR) 10 MG tablet, TAKE 1 TABLET BY MOUTH THREE TIMES A DAY, Disp: 270 tablet, Rfl: 0  ·  ezetimibe (ZETIA) 10 mg tablet, Take 1 tablet (10 mg total) by mouth every evening., Disp: 90 tablet, Rfl: 3  ·  latanoprost 0.005 % ophthalmic solution, Place 1 drop into both eyes once daily., Disp: 2.5 mL, Rfl: 11  ·  LORazepam (ATIVAN) 0.5 MG tablet,  Take 1 tablet (0.5 mg total) by mouth daily as needed for Anxiety., Disp: 30 tablet, Rfl: 0  ·  mirtazapine (REMERON) 7.5 MG Tab, Take 1 tablet (7.5 mg total) by mouth every evening., Disp: 90 tablet, Rfl: 0  ·  pantoprazole (PROTONIX) 40 MG tablet, TAKE 1 TABLET (40 MG TOTAL) BY MOUTH ONCE DAILY., Disp: 90 tablet, Rfl: 3  ·  predniSONE (DELTASONE) 20 MG tablet, Take one daily for 3 days and may repeat for shortness of breath., Disp: 12 tablet, Rfl: 0  ·  propranoloL (INDERAL) 40 MG tablet, TAKE 1 TABLET BY MOUTH TWICE A DAY, Disp: 180 tablet, Rfl: 3  ·  rosuvastatin (CRESTOR) 10 MG tablet, Take 1 tablet (10 mg total) by mouth once daily., Disp: 90 tablet, Rfl: 3  ·  vortioxetine (TRINTELLIX) 20 mg Tab, Take 1 tablet (20 mg total) by mouth Daily., Disp: 90 tablet, Rfl: 0    Rash        Review of Systems   Constitutional:  Negative for fever, chills, weight loss and fatigue.   HENT:  Negative for mouth sores.    Respiratory:  Negative for cough and shortness of breath.    Skin:  Positive for itching, rash, daily sunscreen use, activity-related sunscreen use and wears hat.   Psychiatric/Behavioral:  Negative for anxiety.        Objective:   Physical Exam   Constitutional: She appears well-developed and well-nourished. No distress.   Neurological: She is alert and oriented to person, place, and time. She is not disoriented.   Psychiatric: She has a normal mood and affect.   Skin:   Areas Examined (abnormalities noted in diagram):   Scalp / Hair Palpated and Inspected  Head / Face Inspection Performed  Neck Inspection Performed  Chest / Axilla Inspection Performed  Abdomen Inspection Performed  Genitals / Buttocks / Groin Inspection Performed  Back Inspection Performed  RUE Inspected  LUE Inspection Performed  RLE Inspected  LLE Inspection Performed  Nails and Digits Inspection Performed                     Diagram Legend     Erythematous scaling macule/papule c/w actinic keratosis       Vascular papule c/w angioma       Pigmented verrucoid papule/plaque c/w seborrheic keratosis      Yellow umbilicated papule c/w sebaceous hyperplasia      Irregularly shaped tan macule c/w lentigo     1-2 mm smooth white papules consistent with Milia      Movable subcutaneous cyst with punctum c/w epidermal inclusion cyst      Subcutaneous movable cyst c/w pilar cyst      Firm pink to brown papule c/w dermatofibroma      Pedunculated fleshy papule(s) c/w skin tag(s)      Evenly pigmented macule c/w junctional nevus     Mildly variegated pigmented, slightly irregular-bordered macule c/w mildly atypical nevus      Flesh colored to evenly pigmented papule c/w intradermal nevus       Pink pearly papule/plaque c/w basal cell carcinoma      Erythematous hyperkeratotic cursted plaque c/w SCC      Surgical scar with no sign of skin cancer recurrence      Open and closed comedones      Inflammatory papules and pustules      Verrucoid papule consistent consistent with wart     Erythematous eczematous patches and plaques     Dystrophic onycholytic nail with subungual debris c/w onychomycosis     Umbilicated papule    Erythematous-base heme-crusted tan verrucoid plaque consistent with inflamed seborrheic keratosis     Erythematous Silvery Scaling Plaque c/w Psoriasis     See annotation                                    Assessment / Plan:      Pathology Orders:       Normal Orders This Visit    Specimen to Pathology, Dermatology     Comments:    Number of Specimens:->3  ------------------------->-------------------------  Spec 1 Procedure:->Biopsy  Spec 1 Clinical Impression:->1 month h/o intense itch,  scattered excoriated papules, PN vs drug vs arthropod  Spec 1 Source:->left abdomen  ------------------------->-------------------------  Spec 2 Procedure:->Biopsy  Spec 2 Clinical Impression:->1 month h/o intense itch,  scattered excoriated papules, PN vs drug vs arthropod  Spec 2 Source:->left lateral  thigh  ------------------------->-------------------------  Spec 3 Procedure:->Biopsy  Spec 3 Clinical Impression:->1 month h/o intense itch,  scattered excoriated papules, PN vs drug vs arthropod  Spec 3 Source:->R upper arm    Questions:    Procedure Type: Dermatology and skin neoplasms    Number of Specimens: 3    ------------------------: -------------------------    Spec 1 Procedure: Biopsy    Spec 1 Clinical Impression: 1 month h/o intense itch, scattered excoriated papules, PN vs drug vs arthropod    Spec 1 Source: left abdomen    ------------------------: -------------------------    Spec 2 Procedure: Biopsy    Spec 2 Clinical Impression: 1 month h/o intense itch, scattered excoriated papules, PN vs drug vs arthropod    Spec 2 Source: left lateral thigh    ------------------------: -------------------------    Spec 3 Procedure: Biopsy    Spec 3 Clinical Impression: 1 month h/o intense itch, scattered excoriated papules, PN vs drug vs arthropod    Spec 3 Source: R upper arm    Release to patient:           Disease of skin and subcutaneous tissue  -     CBC Auto Differential; Future; Expected date: 01/24/2025  -     Comprehensive Metabolic Panel; Future; Expected date: 01/24/2025  -     TSH; Future; Expected date: 01/24/2025  -     triamcinolone acetonide 0.1% (KENALOG) 0.1 % cream; AAA bid  Dispense: 454 g; Refill: 3  -     Specimen to Pathology, Dermatology    Pruritus  -     CBC Auto Differential; Future; Expected date: 01/24/2025  -     Comprehensive Metabolic Panel; Future; Expected date: 01/24/2025  -     TSH; Future; Expected date: 01/24/2025  -     triamcinolone acetonide 0.1% (KENALOG) 0.1 % cream; AAA bid  Dispense: 454 g; Refill: 3    Punch biopsy procedure note:x2  Punch biopsy performed after verbal consent obtained. Area marked and prepped with alcohol. Approximately 1cc of 1% lidocaine with epinephrine injected. 4 mm disposable punch used to remove lesion. Hemostasis obtained and biopsy site  closed with 1 - 2 Prolene sutures. Wound care instructions reviewed with patient and handout given.    Shave biopsy procedure note:    Shave biopsy performed after verbal consent including risk of infection, scar, recurrence, need for additional treatment of site. Area prepped with alcohol, anesthetized with approximately 1.0cc of 1% lidocaine with epinephrine. Lesional tissue shaved with razor blade. Hemostasis achieved with application of aluminum chloride followed by hyfrecation. No complications. Dressing applied. Wound care explained.      ROS reassuring  No furrows,  shares bed and fine, classica areas (finger webs, groin) are clear  H/o PN, mild  Endorses picking  All meds long term except   - ezetimibe - started 10/2024  - propranol (switch from metoprolol x decades, heart issues, better controlled with different beta blocker)  - crestor restarted 01/2025 after several years off         No follow-ups on file.

## 2025-01-27 LAB
FINAL PATHOLOGIC DIAGNOSIS: NORMAL
GROSS: NORMAL
Lab: NORMAL
MICROSCOPIC EXAM: NORMAL

## 2025-01-29 ENCOUNTER — LAB VISIT (OUTPATIENT)
Dept: LAB | Facility: HOSPITAL | Age: 80
End: 2025-01-29
Attending: DERMATOLOGY
Payer: MEDICARE

## 2025-01-29 DIAGNOSIS — L98.9 DISEASE OF SKIN AND SUBCUTANEOUS TISSUE: ICD-10-CM

## 2025-01-29 DIAGNOSIS — L29.9 PRURITUS: ICD-10-CM

## 2025-01-29 LAB
ALBUMIN SERPL BCP-MCNC: 3.5 G/DL (ref 3.5–5.2)
ALP SERPL-CCNC: 79 U/L (ref 40–150)
ALT SERPL W/O P-5'-P-CCNC: 25 U/L (ref 10–44)
ANION GAP SERPL CALC-SCNC: 12 MMOL/L (ref 8–16)
AST SERPL-CCNC: 46 U/L (ref 10–40)
BASOPHILS # BLD AUTO: 0.05 K/UL (ref 0–0.2)
BASOPHILS NFR BLD: 0.5 % (ref 0–1.9)
BILIRUB SERPL-MCNC: 0.8 MG/DL (ref 0.1–1)
BUN SERPL-MCNC: 32 MG/DL (ref 8–23)
CALCIUM SERPL-MCNC: 9.2 MG/DL (ref 8.7–10.5)
CHLORIDE SERPL-SCNC: 99 MMOL/L (ref 95–110)
CO2 SERPL-SCNC: 25 MMOL/L (ref 23–29)
CREAT SERPL-MCNC: 1.2 MG/DL (ref 0.5–1.4)
DIFFERENTIAL METHOD BLD: ABNORMAL
EOSINOPHIL # BLD AUTO: 0.5 K/UL (ref 0–0.5)
EOSINOPHIL NFR BLD: 4.6 % (ref 0–8)
ERYTHROCYTE [DISTWIDTH] IN BLOOD BY AUTOMATED COUNT: 13.8 % (ref 11.5–14.5)
EST. GFR  (NO RACE VARIABLE): 45.5 ML/MIN/1.73 M^2
GLUCOSE SERPL-MCNC: 77 MG/DL (ref 70–110)
HCT VFR BLD AUTO: 35.7 % (ref 37–48.5)
HGB BLD-MCNC: 11.2 G/DL (ref 12–16)
IMM GRANULOCYTES # BLD AUTO: 0.05 K/UL (ref 0–0.04)
IMM GRANULOCYTES NFR BLD AUTO: 0.5 % (ref 0–0.5)
LYMPHOCYTES # BLD AUTO: 1 K/UL (ref 1–4.8)
LYMPHOCYTES NFR BLD: 9 % (ref 18–48)
MCH RBC QN AUTO: 32.2 PG (ref 27–31)
MCHC RBC AUTO-ENTMCNC: 31.4 G/DL (ref 32–36)
MCV RBC AUTO: 103 FL (ref 82–98)
MONOCYTES # BLD AUTO: 1.4 K/UL (ref 0.3–1)
MONOCYTES NFR BLD: 12.6 % (ref 4–15)
NEUTROPHILS # BLD AUTO: 7.9 K/UL (ref 1.8–7.7)
NEUTROPHILS NFR BLD: 72.8 % (ref 38–73)
NRBC BLD-RTO: 0 /100 WBC
PLATELET # BLD AUTO: 279 K/UL (ref 150–450)
PMV BLD AUTO: 9.7 FL (ref 9.2–12.9)
POTASSIUM SERPL-SCNC: 4 MMOL/L (ref 3.5–5.1)
PROT SERPL-MCNC: 7 G/DL (ref 6–8.4)
RBC # BLD AUTO: 3.48 M/UL (ref 4–5.4)
SODIUM SERPL-SCNC: 136 MMOL/L (ref 136–145)
TSH SERPL DL<=0.005 MIU/L-ACNC: 1.22 UIU/ML (ref 0.4–4)
WBC # BLD AUTO: 10.79 K/UL (ref 3.9–12.7)

## 2025-01-29 PROCEDURE — 85025 COMPLETE CBC W/AUTO DIFF WBC: CPT | Performed by: DERMATOLOGY

## 2025-01-29 PROCEDURE — 36415 COLL VENOUS BLD VENIPUNCTURE: CPT | Mod: PO | Performed by: DERMATOLOGY

## 2025-01-29 PROCEDURE — 80053 COMPREHEN METABOLIC PANEL: CPT | Performed by: DERMATOLOGY

## 2025-01-29 PROCEDURE — 84443 ASSAY THYROID STIM HORMONE: CPT | Performed by: DERMATOLOGY

## 2025-02-07 ENCOUNTER — LAB VISIT (OUTPATIENT)
Dept: LAB | Facility: HOSPITAL | Age: 80
End: 2025-02-07
Attending: DERMATOLOGY
Payer: MEDICARE

## 2025-02-07 ENCOUNTER — OFFICE VISIT (OUTPATIENT)
Dept: DERMATOLOGY | Facility: CLINIC | Age: 80
End: 2025-02-07
Payer: MEDICARE

## 2025-02-07 VITALS — HEIGHT: 67 IN | BODY MASS INDEX: 18.13 KG/M2 | WEIGHT: 115.5 LBS

## 2025-02-07 DIAGNOSIS — L50.9 URTICARIAL DERMATITIS: Primary | ICD-10-CM

## 2025-02-07 DIAGNOSIS — L50.9 URTICARIAL DERMATITIS: ICD-10-CM

## 2025-02-07 DIAGNOSIS — L28.1 PRURIGO NODULARIS: ICD-10-CM

## 2025-02-07 PROCEDURE — 3288F FALL RISK ASSESSMENT DOCD: CPT | Mod: CPTII,S$GLB,, | Performed by: DERMATOLOGY

## 2025-02-07 PROCEDURE — 1157F ADVNC CARE PLAN IN RCRD: CPT | Mod: CPTII,S$GLB,, | Performed by: DERMATOLOGY

## 2025-02-07 PROCEDURE — 99214 OFFICE O/P EST MOD 30 MIN: CPT | Mod: S$GLB,,, | Performed by: DERMATOLOGY

## 2025-02-07 PROCEDURE — 83516 IMMUNOASSAY NONANTIBODY: CPT | Mod: 59 | Performed by: DERMATOLOGY

## 2025-02-07 PROCEDURE — 36415 COLL VENOUS BLD VENIPUNCTURE: CPT | Mod: PO | Performed by: DERMATOLOGY

## 2025-02-07 PROCEDURE — 1101F PT FALLS ASSESS-DOCD LE1/YR: CPT | Mod: CPTII,S$GLB,, | Performed by: DERMATOLOGY

## 2025-02-07 PROCEDURE — 1159F MED LIST DOCD IN RCRD: CPT | Mod: CPTII,S$GLB,, | Performed by: DERMATOLOGY

## 2025-02-07 PROCEDURE — 1160F RVW MEDS BY RX/DR IN RCRD: CPT | Mod: CPTII,S$GLB,, | Performed by: DERMATOLOGY

## 2025-02-07 RX ORDER — DUPILUMAB 300 MG/2ML
INJECTION, SOLUTION SUBCUTANEOUS
Qty: 8 ML | Refills: 0 | Status: ACTIVE | OUTPATIENT
Start: 2025-02-07

## 2025-02-07 RX ORDER — DUPILUMAB 300 MG/2ML
INJECTION, SOLUTION SUBCUTANEOUS
Qty: 4 ML | Refills: 11 | Status: ACTIVE | OUTPATIENT
Start: 2025-02-07 | End: 2025-02-14

## 2025-02-07 NOTE — PROGRESS NOTES
Writer did training with pt and her  for injection administration/safety/infection control with verbal talk back. After verifying orders, writer administered Dupixent 300mg/2 ml into pt's left mid thigh without any complications.  Pt's  administered Dupixent 300mg/2ml into pt's right mid thigh without any complications.  Pt monitored for 15 minutes s/p injections.    NDC # 1141-9421-66  Lot # [id number]  Exp # [id number]

## 2025-02-07 NOTE — PROGRESS NOTES
Subjective:      Patient ID:  Ayla Dela Cruz is a 79 y.o. female who presents for   Chief Complaint   Patient presents with    Follow-up     Results review      LOV 1/24/25 Disease of skin and subcutaneous tissue    FU scheduled 2/7/25, will discuss results     1. Skin, left abdomen, punch biopsy:  -ALLERGIC URTICARIAL REACTION, see comment     2. Skin, left lateral thigh, punch biopsy:  -EXCORIATED EPIDERMIS WITH UNDERLYING ALLERGIC URTICARIAL REACTION, see comment     COMMENT:  Clinical images were reviewed in epic and differential diagnosis is noted.  The histological findings support the clinical impression of a hypersensitivity reaction such as drug reaction or arthropod assault.  Urticaria and bullous pemphigoid  may, at times, have similar morphologic findings.  Clinical correlation is recommended.    Punch biopsy sections show a mild superficial perivascular and interstitial lymphohistiocytic infiltrate with some scattered neutrophils and rare eosinophils.  The epidermis is fairly unremarkable with preserved granular layer and overlying basket  weave stratum corneum.  Punch biopsy sections show a centrally excoriated epidermis with associated fibrin and mixed inflammation.  There is a superficial and mid perivascular and interstitial lymphohistiocytic infiltrate with some scattered neutrophils and rare eosinophils.     3. Skin, right upper arm, shave biopsy:  -SQUAMOPROLIFERATIVE LESION, see comment     COMMENT:  Clinical images were reviewed in epic and differential diagnosis noted.  The interpretation of this biopsy is limited due to its superficial nature.  I favor the lesion to represent a focus of prurigo nodularis.  However, the lesion is  transected at the base and therefore the surface of a well-differentiated squamous cell carcinoma can not be completely excluded this time.  Deeper re-biopsy is recommended.    Shave biopsy sections show the surface of an epidermal lesion characterized by  acanthosis, low papillomatosis, focal areas of hypergranulosis, and hyperkeratosis.  Some areas contain serum deposition with inflammation and parakeratosis.      Patient here today for suture removal and results review.  Applying Triamcinolone, resolving.     **Pacemaker**     Derm Hx:  BCC left upper lip- 10/2022 , MOHs with  01/2023  BCC left upper back- 10/2022 , E&S  BCC right angle of mandible E&s 12/2023  Denies Fhx of MM     All meds long term except   - ezetimibe - started 10/2024  - propranol (switch from metoprolol x decades, heart issues, better controlled with different beta blocker)- 09/2024  - crestor restarted 01/2025 after several years off    Current Outpatient Medications:   ·  apixaban (ELIQUIS) 2.5 mg Tab, Take 1 tablet (2.5 mg total) by mouth 2 (two) times daily., Disp: 180 tablet, Rfl: 3  ·  aspirin 81 MG Chew, Take 81 mg by mouth once daily., Disp: , Rfl:   ·  atropine 1% (ISOPTO ATROPINE) 1 % Drop, PLACE 1 DROP INTO THE RIGHT EYE 2 TIMES A DAY., Disp: 5 mL, Rfl: 6  ·  azelastine (ASTELIN) 137 mcg (0.1 %) nasal spray, 1 spray (137 mcg total) by Nasal route 2 (two) times daily., Disp: 30 mL, Rfl: 5  ·  busPIRone (BUSPAR) 10 MG tablet, TAKE 1 TABLET BY MOUTH THREE TIMES A DAY, Disp: 270 tablet, Rfl: 0  ·  ezetimibe (ZETIA) 10 mg tablet, Take 1 tablet (10 mg total) by mouth every evening., Disp: 90 tablet, Rfl: 3  ·  latanoprost 0.005 % ophthalmic solution, Place 1 drop into both eyes once daily., Disp: 2.5 mL, Rfl: 11  ·  LORazepam (ATIVAN) 0.5 MG tablet, Take 1 tablet (0.5 mg total) by mouth daily as needed for Anxiety., Disp: 30 tablet, Rfl: 0  ·  mirtazapine (REMERON) 7.5 MG Tab, Take 1 tablet (7.5 mg total) by mouth every evening., Disp: 90 tablet, Rfl: 0  ·  pantoprazole (PROTONIX) 40 MG tablet, TAKE 1 TABLET (40 MG TOTAL) BY MOUTH ONCE DAILY., Disp: 90 tablet, Rfl: 3  ·  predniSONE (DELTASONE) 20 MG tablet, Take one daily for 3 days and may repeat for  shortness of breath., Disp: 12 tablet, Rfl: 0  ·  propranoloL (INDERAL) 40 MG tablet, TAKE 1 TABLET BY MOUTH TWICE A DAY, Disp: 180 tablet, Rfl: 3  ·  rosuvastatin (CRESTOR) 10 MG tablet, Take 1 tablet (10 mg total) by mouth once daily., Disp: 90 tablet, Rfl: 3  ·  triamcinolone acetonide 0.1% (KENALOG) 0.1 % cream, AAA bid, Disp: 454 g, Rfl: 3  ·  vortioxetine (TRINTELLIX) 20 mg Tab, Take 1 tablet (20 mg total) by mouth Daily., Disp: 90 tablet, Rfl: 0        Review of Systems   Constitutional:  Negative for fever, chills, weight loss and fatigue.   HENT:  Negative for mouth sores.    Respiratory:  Negative for cough and shortness of breath.    Skin:  Positive for itching, rash, daily sunscreen use, activity-related sunscreen use and wears hat.   Psychiatric/Behavioral:  Negative for anxiety.        Objective:   Physical Exam   Constitutional: She appears well-developed and well-nourished. No distress.   Neurological: She is alert and oriented to person, place, and time. She is not disoriented.   Psychiatric: She has a normal mood and affect.   Skin:   Areas Examined (abnormalities noted in diagram):   Scalp / Hair Palpated and Inspected  Head / Face Inspection Performed  Neck Inspection Performed  Chest / Axilla Inspection Performed  Abdomen Inspection Performed  Genitals / Buttocks / Groin Inspection Performed  Back Inspection Performed  RUE Inspected  LUE Inspection Performed  RLE Inspected  LLE Inspection Performed  Nails and Digits Inspection Performed                     Diagram Legend     Erythematous scaling macule/papule c/w actinic keratosis       Vascular papule c/w angioma      Pigmented verrucoid papule/plaque c/w seborrheic keratosis      Yellow umbilicated papule c/w sebaceous hyperplasia      Irregularly shaped tan macule c/w lentigo     1-2 mm smooth white papules consistent with Milia      Movable subcutaneous cyst with punctum c/w epidermal inclusion cyst      Subcutaneous movable cyst c/w pilar  cyst      Firm pink to brown papule c/w dermatofibroma      Pedunculated fleshy papule(s) c/w skin tag(s)      Evenly pigmented macule c/w junctional nevus     Mildly variegated pigmented, slightly irregular-bordered macule c/w mildly atypical nevus      Flesh colored to evenly pigmented papule c/w intradermal nevus       Pink pearly papule/plaque c/w basal cell carcinoma      Erythematous hyperkeratotic cursted plaque c/w SCC      Surgical scar with no sign of skin cancer recurrence      Open and closed comedones      Inflammatory papules and pustules      Verrucoid papule consistent consistent with wart     Erythematous eczematous patches and plaques     Dystrophic onycholytic nail with subungual debris c/w onychomycosis     Umbilicated papule    Erythematous-base heme-crusted tan verrucoid plaque consistent with inflamed seborrheic keratosis     Erythematous Silvery Scaling Plaque c/w Psoriasis     See annotation   Latest Reference Range & Units 01/29/25 11:21   WBC 3.90 - 12.70 K/uL 10.79   RBC 4.00 - 5.40 M/uL 3.48 (L)   Hemoglobin 12.0 - 16.0 g/dL 11.2 (L)   Hematocrit 37.0 - 48.5 % 35.7 (L)   MCV 82 - 98 fL 103 (H)   MCH 27.0 - 31.0 pg 32.2 (H)   MCHC 32.0 - 36.0 g/dL 31.4 (L)   RDW 11.5 - 14.5 % 13.8   Platelet Count 150 - 450 K/uL 279   MPV 9.2 - 12.9 fL 9.7   Gran % 38.0 - 73.0 % 72.8   Lymph % 18.0 - 48.0 % 9.0 (L)   Mono % 4.0 - 15.0 % 12.6   Eos % 0.0 - 8.0 % 4.6   Basophil % 0.0 - 1.9 % 0.5   Immature Granulocytes 0.0 - 0.5 % 0.5   Gran # (ANC) 1.8 - 7.7 K/uL 7.9 (H)   Lymph # 1.0 - 4.8 K/uL 1.0   Mono # 0.3 - 1.0 K/uL 1.4 (H)   Eos # 0.0 - 0.5 K/uL 0.5   Baso # 0.00 - 0.20 K/uL 0.05   Immature Grans (Abs) 0.00 - 0.04 K/uL 0.05 (H)   nRBC 0 /100 WBC 0   Differential Method  Automated   Sodium 136 - 145 mmol/L 136   Potassium 3.5 - 5.1 mmol/L 4.0   Chloride 95 - 110 mmol/L 99   CO2 23 - 29 mmol/L 25   Anion Gap 8 - 16 mmol/L 12   BUN 8 - 23 mg/dL 32 (H)   Creatinine 0.5 - 1.4 mg/dL 1.2   eGFR >60  mL/min/1.73 m^2 45.5 !   Glucose 70 - 110 mg/dL 77   Calcium 8.7 - 10.5 mg/dL 9.2   ALP 40 - 150 U/L 79   PROTEIN TOTAL 6.0 - 8.4 g/dL 7.0   Albumin 3.5 - 5.2 g/dL 3.5   BILIRUBIN TOTAL 0.1 - 1.0 mg/dL 0.8   AST 10 - 40 U/L 46 (H)   ALT 10 - 44 U/L 25   TSH 0.400 - 4.000 uIU/mL 1.218   (L): Data is abnormally low  (H): Data is abnormally high  !: Data is abnormal   Latest Reference Range & Units 01/29/25 11:21   TSH 0.400 - 4.000 uIU/mL 1.218     Assessment / Plan:        Urticarial dermatitis  -     Bullous Pemphigoid, , , IgG, Serum; Future; Expected date: 02/07/2025    Prurigo nodularis  -     dupilumab (DUPIXENT PEN) 300 mg/2 mL PnIj; Inject 2 pens (600mg) under the skin at day 0 then 1 pen (300mg) at day 14 and 28  Dispense: 8 mL; Refill: 0  -     dupilumab (DUPIXENT PEN) 300 mg/2 mL PnIj; Inject 1 pen (300mg) under the skin every 2 weeks  Dispense: 4 mL; Refill: 11    20+ nodules, arms and leg  Extremely itchy    No interface on path  Background rash has improved/resolved with tac cream  Multiple excoriated nodules on arms and legs dominate  Some meds changes in recent months, all essential and difficult to hold for 3 months    Discussed:  - meds holding  - systemic steroids  - steroid sparing immunosuppressives (cellcelpt)  - dupixent (several years h/o PN with recent worsening)  Currently, itch dominates and severely affects QOL    Rule out early BP with serology    Dupixent loading today (see nurse's note)           No follow-ups on file.

## 2025-02-11 PROBLEM — L28.1 PRURIGO NODULARIS: Status: ACTIVE | Noted: 2025-02-11

## 2025-02-11 PROBLEM — L50.9 URTICARIAL DERMATITIS: Status: ACTIVE | Noted: 2025-02-11

## 2025-02-14 ENCOUNTER — OFFICE VISIT (OUTPATIENT)
Dept: FAMILY MEDICINE | Facility: CLINIC | Age: 80
End: 2025-02-14
Payer: MEDICARE

## 2025-02-14 VITALS
HEIGHT: 67 IN | WEIGHT: 117.75 LBS | SYSTOLIC BLOOD PRESSURE: 118 MMHG | HEART RATE: 90 BPM | OXYGEN SATURATION: 99 % | BODY MASS INDEX: 18.48 KG/M2 | DIASTOLIC BLOOD PRESSURE: 84 MMHG

## 2025-02-14 DIAGNOSIS — I50.30 HEART FAILURE WITH PRESERVED EJECTION FRACTION, UNSPECIFIED HF CHRONICITY: ICD-10-CM

## 2025-02-14 DIAGNOSIS — R54 FRAIL ELDERLY: ICD-10-CM

## 2025-02-14 DIAGNOSIS — Z78.0 ASYMPTOMATIC MENOPAUSAL STATE: ICD-10-CM

## 2025-02-14 DIAGNOSIS — E78.2 MIXED HYPERLIPIDEMIA: ICD-10-CM

## 2025-02-14 DIAGNOSIS — I48.19 PERSISTENT ATRIAL FIBRILLATION: ICD-10-CM

## 2025-02-14 DIAGNOSIS — G47.33 OSA ON CPAP: ICD-10-CM

## 2025-02-14 DIAGNOSIS — H40.41X0 UVEITIS-HYPHEMA-GLAUCOMA SYNDROME OF RIGHT EYE: ICD-10-CM

## 2025-02-14 DIAGNOSIS — T85.398A UVEITIS-HYPHEMA-GLAUCOMA SYNDROME OF RIGHT EYE: ICD-10-CM

## 2025-02-14 DIAGNOSIS — F13.20 BENZODIAZEPINE DEPENDENCE: ICD-10-CM

## 2025-02-14 DIAGNOSIS — K21.9 GASTROESOPHAGEAL REFLUX DISEASE WITHOUT ESOPHAGITIS: ICD-10-CM

## 2025-02-14 DIAGNOSIS — H40.1134 PRIMARY OPEN ANGLE GLAUCOMA (POAG) OF BOTH EYES, INDETERMINATE STAGE: ICD-10-CM

## 2025-02-14 DIAGNOSIS — I70.0 ATHEROSCLEROSIS OF AORTA: ICD-10-CM

## 2025-02-14 DIAGNOSIS — H20.9 UVEITIS-HYPHEMA-GLAUCOMA SYNDROME OF RIGHT EYE: ICD-10-CM

## 2025-02-14 DIAGNOSIS — F50.00 ANOREXIA NERVOSA: ICD-10-CM

## 2025-02-14 DIAGNOSIS — Z00.00 ROUTINE GENERAL MEDICAL EXAMINATION AT A HEALTH CARE FACILITY: Primary | ICD-10-CM

## 2025-02-14 DIAGNOSIS — F41.1 GAD (GENERALIZED ANXIETY DISORDER): ICD-10-CM

## 2025-02-14 LAB
BMZ BP180 IGG SERPL-ACNC: <2 RU/ML
BMZ BP230 IGG SERPL-ACNC: <2 RU/ML

## 2025-02-14 PROCEDURE — 99999 PR PBB SHADOW E&M-EST. PATIENT-LVL IV: CPT | Mod: PBBFAC,,, | Performed by: STUDENT IN AN ORGANIZED HEALTH CARE EDUCATION/TRAINING PROGRAM

## 2025-02-14 RX ORDER — UBIDECARENONE 100 MG
100 CAPSULE ORAL DAILY
Qty: 30 CAPSULE | Refills: 11 | Status: SHIPPED | OUTPATIENT
Start: 2025-02-14 | End: 2026-02-14

## 2025-02-14 NOTE — PROGRESS NOTES
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 (ESOPHAGOGASTRODUODENOSCOPY);  Surgeon: Alex Christian MD;  Location: [facility];  Service: Endoscopy;  Laterality: N/A;    INJECTION OF ANESTHETIC AGENT AROUND MEDIAL BRANCH NERVES INNERVATING CERVICAL FACET JOINT Right 6/7/2018    Procedure: BLOCK, NERVE, FACET JOINT, MEDIAL BRANCH, CERVICAL;  Surgeon: Galo Clancy MD;  Location: [facility];  Service: Pain Management;  Laterality: Right;  C4, 5, 6    OPEN REDUCTION AND INTERNAL FIXATION (ORIF) OF INJURY OF ANKLE Right 11/1/2018    Procedure: ORIF, ANKLE;  Surgeon: Wilfredo Aguero MD;  Location: [facility];  Service: Orthopedics;  Laterality: Right;    PARACENTESIS, EYE, ANTERIOR CHAMBER, WITH AQUEOUS REMOVAL Right 7/27/2022    Procedure: Anterior chamber washout, possible IOL removal;  Surgeon: Daniel Tan MD;  Location: [facility];  Service: Ophthalmology;  Laterality: Right;    pyloristenosis      RADIOFREQUENCY ABLATION OF LUMBAR MEDIAL BRANCH NERVE AT SINGLE LEVEL Bilateral 9/21/2018    Procedure: RADIOFREQUENCY ABLATION, NERVE, SPINAL, LUMBAR, MEDIAL BRANCH, 1 LEVEL;  Surgeon: Galo Clancy MD;  [facility];  Service: Pain Management;  Laterality: Bilateral;  L3, 4, 5    RADIOFREQUENCY ABLATION OF LUMBAR MEDIAL BRANCH NERVE AT SINGLE LEVEL Bilateral 2/19/2019    Procedure: Radiofrequency Ablation, Nerve, Spinal, Lumbar, Medial Branch, 1 Level;  Surgeon: Galo Clancy MD;   Location: [facility] OR;  Service: Pain Management;  Laterality: Bilateral;  L3, 4, 5     RADIOFREQUENCY ABLATION OF LUMBAR MEDIAL BRANCH NERVE AT SINGLE LEVEL Bilateral 3/10/2020    Procedure: Radiofrequency Ablation, Nerve, Spinal, Lumbar, Medial Branch, 1 Level;  Surgeon: Galo Clancy MD;  Location: [facility] OR;  Service: Pain Management;  Laterality: Bilateral;  L3,4,5 - Burned at 80 degrees C. for 60 seconds x 2 each site      RADIOFREQUENCY ABLATION OF LUMBAR MEDIAL BRANCH NERVE AT SINGLE LEVEL Bilateral 9/11/2020    Procedure: Radiofrequency Ablation, Nerve, Spinal, Lumbar, Medial Branch, 1 Level;  Surgeon: Galo Clancy MD;  Location: [facility] OR;  Service: Pain Management;  Laterality: Bilateral;  L3, 4, 5 - Burned at 80 degrees C. for 60 seconds x 2 each site    RADIOFREQUENCY ABLATION OF LUMBAR MEDIAL BRANCH NERVE AT SINGLE LEVEL Bilateral 5/28/2021    Procedure: Radiofrequency Ablation, Nerve, Spinal, Lumbar, Medial Branch, 1 Level;  Surgeon: Galo Clancy MD;  Location: [facility] OR;  Service: Pain Management;  Laterality: Bilateral;  L3,4,5    RADIOFREQUENCY THERMAL COAGULATION OF MEDIAL BRANCH OF POSTERIOR RAMUS OF CERVICAL SPINAL NERVE Right 7/3/2018    Procedure: RADIOFREQUENCY THERMAL COAGULATION, NERVE, SPINAL, CERVICAL, MEDIAL BRANCH OF POSTERIOR RAMUS;  Surgeon: Galo Clancy MD;  Location: [facility] OR;  Service: Pain Management;  Laterality: Right;  C4,5,6 - Burned at 80 degrees C. for 75 seconds each site    RADIOFREQUENCY THERMAL COAGULATION OF MEDIAL BRANCH OF POSTERIOR RAMUS OF CERVICAL SPINAL NERVE Right 7/23/2019    Procedure: RADIOFREQUENCY THERMAL COAGULATION, NERVE, SPINAL, CERVICAL, POSTERIOR RAMUS, MEDIAL BRANCH;  Surgeon: Galo Clancy MD;  Location: [facility] OR;  Service: Pain Management;  Laterality: Right;  C4,5,6    RADIOFREQUENCY THERMAL COAGULATION OF MEDIAL BRANCH OF POSTERIOR RAMUS OF CERVICAL SPINAL NERVE Right 6/23/2020    Procedure: RADIOFREQUENCY THERMAL COAGULATION, NERVE, SPINAL, CERVICAL, POSTERIOR  RAMUS, MEDIAL BRANCH;  Surgeon: Galo Clancy MD;  Location: [facility] OR;  Service: Pain Management;  Laterality: Right;  C4, 5, 6    RADIOFREQUENCY THERMOCOAGULATION Bilateral 9/10/2019    Procedure: RADIOFREQUENCY THERMAL COAGULATION LUMBAR;  Surgeon: Galo Clancy MD;  Location: [facility] OR;  Service: Pain Management;  Laterality: Bilateral;  L3,4,5 - Burned at 80 degrees C. for 60 seconds x 2 each site    skin cancer removal       TONSILLECTOMY       Family History   Problem Relation Name Age of Onset    Arthritis Mother      Asthma Mother      Rheum arthritis Mother      Pneumonia Mother      Skin cancer Mother          unsure of type    Heart disease Father      Ulcers Father      Depression Son      Alzheimer's disease Maternal Uncle      Rheum arthritis Maternal Grandmother      Emphysema Maternal Grandfather      Cancer Maternal Grandfather          kidney    Kidney disease Maternal Grandfather      Cancer Paternal Grandmother          lung= smoker    Pneumonia Paternal Grandfather      Breast cancer Neg Hx      Ovarian cancer Neg Hx         Marital Status:   Alcohol History:  reports no history of alcohol use.  Tobacco History:  reports that she has never smoked. She has never used smokeless tobacco.  Drug History:  reports no history of drug use.    Health Maintenance Topics with due status: Not Due       Topic Last Completion Date    TETANUS VACCINE 08/30/2020    Lipid Panel 09/30/2024     Immunization History   Administered Date(s) Administered    COVID-19 MRNA, LN-S PF (MODERNA HALF 0.25 ML DOSE) 12/27/2021    COVID-19, MRNA, LN-S, PF (MODERNA FULL 0.5 ML DOSE) 02/03/2021, 03/03/2021    Hepatitis A, Adult 05/04/2006, 12/14/2007    Influenza (FLUAD) - Quadrivalent - Adjuvanted - PF *Preferred* (65+) 09/24/2023    Influenza - Quadrivalent - High Dose - PF (65 years and older) 12/29/2021, 11/10/2022    Influenza - Trivalent - Afluria, Fluzone MDV 10/08/2009, 10/18/2010, 10/20/2011    Influenza - Trivalent -  Fluad - Adjuvanted - PF (65 years and older 08/31/2019, 11/03/2024    Influenza - Trivalent - Fluzone High Dose - PF (65 years and older) 10/24/2012, 10/29/2013, 11/12/2014, 11/16/2015, 01/16/2017, 10/06/2017, 10/18/2017, 10/16/2018, 08/30/2020, 11/11/2022    Influenza Split 10/08/2009, 10/18/2010, 10/20/2011    Pneumococcal Conjugate - 13 Valent 11/16/2015    Pneumococcal Polysaccharide - 23 Valent 09/24/2010    Td (ADULT) 05/04/2006    Tdap 05/04/2006, 08/30/2020    Zoster Recombinant 08/19/2022, 11/11/2022       Review of patient's allergies indicates:   Allergen Reactions    Gabapentin Hallucinations    Xarelto [rivaroxaban] Rash    Bactrim [sulfamethoxazole-trimethoprim] Other (See Comments)     Upset stomach, dry heaves, confusion    Amoxicillin-pot clavulanate      Other reaction(s): Mental Status Change    Atorvastatin      Other reaction(s): Joint pain    Baclofen     Baclofen (bulk) Nausea And Vomiting    Ciprofloxacin     Decongest tabs      Other reaction(s): increased heart rate    Decongestant [pseudoephedrine hcl]     Erythromycin Other (See Comments)    Flecainide Hives     And SOB. No reaction to Lidocaine     Fluoxetine      Other reaction(s): heart palpitations  Other reaction(s): anxiety    Lisinopril Other (See Comments)     cough    Losartan Other (See Comments)     Hypotension with lightheadedness    Morphine Other (See Comments)     confusion    Tramadol Other (See Comments)     SOB, low BP    Venlafaxine     Venlafaxine analogues      Changes in BP and increases heart rate       Caffeine Palpitations    Dabigatran etexilate Rash    Plavix [clopidogrel] Rash    Tizanidine Anxiety     dizziness       Current Outpatient Medications:     apixaban (ELIQUIS) 2.5 mg Tab, Take 1 tablet (2.5 mg total) by mouth 2 (two) times daily., Disp: 180 tablet, Rfl: 3    aspirin 81 MG Chew, Take 81 mg by mouth once daily., Disp: , Rfl:     atropine 1% (ISOPTO ATROPINE) 1 % Drop, PLACE 1 DROP INTO THE RIGHT EYE 2  TIMES A DAY., Disp: 5 mL, Rfl: 6    azelastine (ASTELIN) 137 mcg (0.1 %) nasal spray, 1 spray (137 mcg total) by Nasal route 2 (two) times daily., Disp: 30 mL, Rfl: 5    busPIRone (BUSPAR) 10 MG tablet, TAKE 1 TABLET BY MOUTH THREE TIMES A DAY, Disp: 270 tablet, Rfl: 0    dupilumab (DUPIXENT PEN) 300 mg/2 mL PnIj, Inject 2 pens (600mg) under the skin at day 0 then 1 pen (300mg) at day 14 and 28, Disp: 8 mL, Rfl: 0    ezetimibe (ZETIA) 10 mg tablet, Take 1 tablet (10 mg total) by mouth every evening., Disp: 90 tablet, Rfl: 3    latanoprost 0.005 % ophthalmic solution, Place 1 drop into both eyes once daily., Disp: 2.5 mL, Rfl: 11    LORazepam (ATIVAN) 0.5 MG tablet, Take 1 tablet (0.5 mg total) by mouth daily as needed for Anxiety., Disp: 30 tablet, Rfl: 0    mirtazapine (REMERON) 7.5 MG Tab, Take 1 tablet (7.5 mg total) by mouth every evening., Disp: 90 tablet, Rfl: 0    pantoprazole (PROTONIX) 40 MG tablet, TAKE 1 TABLET (40 MG TOTAL) BY MOUTH ONCE DAILY., Disp: 90 tablet, Rfl: 3    predniSONE (DELTASONE) 20 MG tablet, Take one daily for 3 days and may repeat for shortness of breath., Disp: 12 tablet, Rfl: 0    propranoloL (INDERAL) 40 MG tablet, TAKE 1 TABLET BY MOUTH TWICE A DAY, Disp: 180 tablet, Rfl: 3    rosuvastatin (CRESTOR) 10 MG tablet, Take 1 tablet (10 mg total) by mouth once daily., Disp: 90 tablet, Rfl: 3    triamcinolone acetonide 0.1% (KENALOG) 0.1 % cream, AAA bid, Disp: 454 g, Rfl: 3    vortioxetine (TRINTELLIX) 20 mg Tab, Take 1 tablet (20 mg total) by mouth Daily., Disp: 90 tablet, Rfl: 0    coenzyme Q10 100 mg capsule, Take 1 capsule (100 mg total) by mouth once daily., Disp: 30 capsule, Rfl: 11  No current facility-administered medications for this visit.    Facility-Administered Medications Ordered in Other Visits:     bupivacaine (PF) 0.25% (2.5 mg/ml) injection, , , PRN, Galo Clancy MD, 6 mL at 02/19/19 1314    lidocaine (PF) 10 mg/ml (1%) injection, , , PRN, Galo Clancy MD, 9 mL at  "02/19/19 1304    lidocaine (PF) 20 mg/ml (2%) injection, , , PRN, Galo Clancy MD, 6 mL at 02/19/19 1310    methylPREDNISolone acetate injection, , , PRN, Galo Clancy MD, 80 mg at 02/19/19 1314    Review of Systems   Constitutional:  Negative for chills and fever.   Respiratory:  Positive for shortness of breath.    Cardiovascular:  Negative for chest pain.   Gastrointestinal:  Negative for blood in stool, change in bowel habit, nausea and vomiting.   Genitourinary:  Negative for hematuria.   Integumentary:  Positive for rash.       OBJECTIVE:      Vitals:    02/14/25 1325   BP: 118/84   BP Location: Left arm   Patient Position: Sitting   Pulse: 90   SpO2: 99%   Weight: 53.4 kg (117 lb 11.6 oz)   Height: 5' 7" (1.702 m)     Physical Exam  Constitutional:       General: She is not in acute distress.     Appearance: She is underweight. She is not ill-appearing or toxic-appearing.   HENT:      Head: Normocephalic and atraumatic.      Mouth/Throat:      Mouth: Mucous membranes are moist.      Pharynx: Uvula midline. No pharyngeal swelling.   Eyes:      Comments: Right eye - iris near the pupil is missing   Left eye - iris near the pupil is missing but not as much as the right eye    Cardiovascular:      Rate and Rhythm: Normal rate and regular rhythm.   Pulmonary:      Effort: Pulmonary effort is normal. No tachypnea, bradypnea, accessory muscle usage, prolonged expiration or respiratory distress.      Breath sounds: Normal breath sounds. No stridor. No wheezing, rhonchi or rales.   Abdominal:      General: There is no distension.      Tenderness: There is no abdominal tenderness. There is no guarding or rebound.   Neurological:      General: No focal deficit present.      Mental Status: She is alert.   Psychiatric:         Mood and Affect: Mood normal.         Behavior: Behavior normal.        Assessment:       1. Routine general medical examination at a health care facility    2. Asymptomatic menopausal state    3. " Heart failure with preserved ejection fraction, unspecified HF chronicity    4. Persistent atrial fibrillation    5. Frail elderly    6. BMI less than 19,adult    7. Atherosclerosis of aorta    8. Mixed hyperlipidemia    9. JOSE (generalized anxiety disorder)    10. Benzodiazepine dependence, Ativan 0.5 mg daily since 2021    11. Gastroesophageal reflux disease without esophagitis    12. NILSA on CPAP, using 53% to 70%    13. Anorexia nervosa    14. Primary open angle glaucoma (POAG) of both eyes, indeterminate stage    15. Uveitis-hyphema-glaucoma syndrome of right eye        Plan:       Routine general medical examination at a health care facility    Asymptomatic menopausal state  -     DXA Bone Density Axial Skeleton 1 or more sites; Future; Expected date: 02/14/2025    Heart failure with preserved ejection fraction, unspecified HF chronicity  -     coenzyme Q10 100 mg capsule; Take 1 capsule (100 mg total) by mouth once daily.  Dispense: 30 capsule; Refill: 11  Continue with propranolol     Persistent atrial fibrillation  Continue with propranolol and eliquis   Stable      Frail elderly  Still BMI < 19 but eating better and she is gaining weight     BMI less than 19,adult  Still BMI < 19 but eating better and she is gaining weight     Atherosclerosis of aorta  Allergic reaction with intense pruritus and diffuse rash with statin  Stopped the statin   Will put her on CoQ10 -- suggested fish oil but patient said she cannot take anything that smells too fishy     Mixed hyperlipidemia  -     coenzyme Q10 100 mg capsule; Take 1 capsule (100 mg total) by mouth once daily.  Dispense: 30 capsule; Refill: 11  Allergic reaction with intense pruritus and diffuse rash with statin  Stopped the statin   Will put her on CoQ10 -- suggested fish oil but patient said she cannot take anything that smells too fishy     JOSE (generalized anxiety disorder)  Managed by psychiatry   Continue with trintellix, propranolol, ativan PRN, and  buspar     Benzodiazepine dependence, Ativan 0.5 mg daily since 2021  Patient states that she only takes ativan PRN    Gastroesophageal reflux disease without esophagitis  Stable  Continue with PPI     NILSA on CPAP, using 53% to 70%  Compliant     Anorexia nervosa  Improving  Continue with mirtazapine and follow up with psychiatry     Primary open angle glaucoma (POAG) of both eyes, indeterminate stage  Stable  Continue with eye drops as well as visit with ophthalmology     Uveitis-hyphema-glaucoma syndrome of right eye  Stable  Continue with eye drops as well as visit with ophthalmology     Counseled on age and gender appropriate medical preventative services, including cancer screenings, immunizations, overall nutritional health, need for a consistent exercise regimen and an overall push towards maintaining a vigorous and active lifestyle.      Follow up with PA in 6 months and with me in 1 year  Sooner appointment if indicated/needed       Gabby Botello M.D.  2/15/2025    This note was created using [employer] voice recognition software that occasionally misinterprets phrases or words

## 2025-02-15 PROBLEM — H40.41X0 UVEITIS-HYPHEMA-GLAUCOMA SYNDROME OF RIGHT EYE: Status: ACTIVE | Noted: 2022-07-06

## 2025-02-15 PROBLEM — H20.9 UVEITIS-HYPHEMA-GLAUCOMA SYNDROME OF RIGHT EYE: Status: ACTIVE | Noted: 2022-07-06

## 2025-02-15 PROBLEM — T85.398A UVEITIS-HYPHEMA-GLAUCOMA SYNDROME OF RIGHT EYE: Status: ACTIVE | Noted: 2022-07-06

## 2025-02-19 ENCOUNTER — RESULTS FOLLOW-UP (OUTPATIENT)
Dept: FAMILY MEDICINE | Facility: CLINIC | Age: 80
End: 2025-02-19

## 2025-02-19 ENCOUNTER — HOSPITAL ENCOUNTER (OUTPATIENT)
Dept: RADIOLOGY | Facility: CLINIC | Age: 80
Discharge: HOME | End: 2025-02-19
Attending: STUDENT IN AN ORGANIZED HEALTH CARE EDUCATION/TRAINING PROGRAM
Payer: MEDICARE

## 2025-02-19 DIAGNOSIS — Z78.0 ASYMPTOMATIC MENOPAUSAL STATE: ICD-10-CM

## 2025-02-19 PROCEDURE — 77080 DXA BONE DENSITY AXIAL: CPT | Mod: TC,PO

## 2025-02-20 ENCOUNTER — PATIENT MESSAGE (OUTPATIENT)
Dept: DERMATOLOGY | Facility: CLINIC | Age: 80
End: 2025-02-20
Payer: MEDICARE

## 2025-02-20 ENCOUNTER — PATIENT MESSAGE (OUTPATIENT)
Dept: CARDIOLOGY | Facility: CLINIC | Age: 80
End: 2025-02-20
Payer: MEDICARE

## 2025-02-24 ENCOUNTER — OFFICE VISIT (OUTPATIENT)
Dept: FAMILY MEDICINE | Facility: CLINIC | Age: 80
End: 2025-02-24
Payer: MEDICARE

## 2025-02-24 VITALS
WEIGHT: 117.31 LBS | DIASTOLIC BLOOD PRESSURE: 74 MMHG | SYSTOLIC BLOOD PRESSURE: 120 MMHG | OXYGEN SATURATION: 99 % | HEIGHT: 67 IN | BODY MASS INDEX: 18.41 KG/M2 | HEART RATE: 71 BPM

## 2025-02-24 DIAGNOSIS — M81.0 AGE-RELATED OSTEOPOROSIS WITHOUT CURRENT PATHOLOGICAL FRACTURE: Primary | ICD-10-CM

## 2025-02-24 DIAGNOSIS — Z00.00 ENCOUNTER FOR MEDICARE ANNUAL WELLNESS EXAM: ICD-10-CM

## 2025-02-24 DIAGNOSIS — E78.2 MIXED HYPERLIPIDEMIA: ICD-10-CM

## 2025-02-24 PROCEDURE — 1160F RVW MEDS BY RX/DR IN RCRD: CPT | Mod: CPTII,S$GLB,, | Performed by: STUDENT IN AN ORGANIZED HEALTH CARE EDUCATION/TRAINING PROGRAM

## 2025-02-24 PROCEDURE — 3288F FALL RISK ASSESSMENT DOCD: CPT | Mod: CPTII,S$GLB,, | Performed by: STUDENT IN AN ORGANIZED HEALTH CARE EDUCATION/TRAINING PROGRAM

## 2025-02-24 PROCEDURE — G2211 COMPLEX E/M VISIT ADD ON: HCPCS | Mod: S$GLB,,, | Performed by: STUDENT IN AN ORGANIZED HEALTH CARE EDUCATION/TRAINING PROGRAM

## 2025-02-24 PROCEDURE — 1126F AMNT PAIN NOTED NONE PRSNT: CPT | Mod: CPTII,S$GLB,, | Performed by: STUDENT IN AN ORGANIZED HEALTH CARE EDUCATION/TRAINING PROGRAM

## 2025-02-24 PROCEDURE — 1157F ADVNC CARE PLAN IN RCRD: CPT | Mod: CPTII,S$GLB,, | Performed by: STUDENT IN AN ORGANIZED HEALTH CARE EDUCATION/TRAINING PROGRAM

## 2025-02-24 PROCEDURE — 3078F DIAST BP <80 MM HG: CPT | Mod: CPTII,S$GLB,, | Performed by: STUDENT IN AN ORGANIZED HEALTH CARE EDUCATION/TRAINING PROGRAM

## 2025-02-24 PROCEDURE — 1159F MED LIST DOCD IN RCRD: CPT | Mod: CPTII,S$GLB,, | Performed by: STUDENT IN AN ORGANIZED HEALTH CARE EDUCATION/TRAINING PROGRAM

## 2025-02-24 PROCEDURE — 3074F SYST BP LT 130 MM HG: CPT | Mod: CPTII,S$GLB,, | Performed by: STUDENT IN AN ORGANIZED HEALTH CARE EDUCATION/TRAINING PROGRAM

## 2025-02-24 PROCEDURE — 1101F PT FALLS ASSESS-DOCD LE1/YR: CPT | Mod: CPTII,S$GLB,, | Performed by: STUDENT IN AN ORGANIZED HEALTH CARE EDUCATION/TRAINING PROGRAM

## 2025-02-24 PROCEDURE — 99999 PR PBB SHADOW E&M-EST. PATIENT-LVL V: CPT | Mod: PBBFAC,,, | Performed by: STUDENT IN AN ORGANIZED HEALTH CARE EDUCATION/TRAINING PROGRAM

## 2025-02-24 PROCEDURE — 99213 OFFICE O/P EST LOW 20 MIN: CPT | Mod: S$GLB,,, | Performed by: STUDENT IN AN ORGANIZED HEALTH CARE EDUCATION/TRAINING PROGRAM

## 2025-02-24 NOTE — PATIENT INSTRUCTIONS
Please take 1200 mg of calcium daily -- this is over the counter supplementation.   Along with your calcium, please take 800 IU of vitamin D every day. This is also an over the counter supplementation.     Weight-bearing exercise will be helpful. Some examples of weight-bearing exercise including walking, strength training such as resistance exercise using weights and resistant bands.  Stair climbing as well as polina chi are also good weight-bearing exercise.    Prolia (denosumab): works to help stop the development of bone-removing cells. Without prolia, an excess of bone-removing cells in your body makes you lose bone faster than your body can rebuild it. This increases risk of fracture    Efficacy: Studies have shown that denosumab is more effective than bisphosphonates at increasing bone mineral density (BMD) at the spine and hip.     Side Effects: The most common side effects include low calcium levels, increased risk of infections, and jaw necrosis (a rare but serious condition).

## 2025-02-24 NOTE — PROGRESS NOTES
"[facility]   OFFICE VISIT NOTE    Patient Name: Ayla Dela Cruz  YOB: 1945    PRESENTING HISTORY     History of Present Illness:  Ms. Ayla Dela Cruz is a 79 y.o. female   Here for osteoporosis discussion     Review of Systems   Constitutional:  Negative for chills and fever.   Respiratory:  Negative for shortness of breath.    Cardiovascular:  Negative for chest pain.   Gastrointestinal:  Negative for nausea and vomiting.          OBJECTIVE:   Vital Signs:  Vitals:    02/24/25 1353   BP: 120/74   Pulse: 71   SpO2: 99%   Weight: 53.2 kg (117 lb 4.6 oz)   Height: 5' 7" (1.702 m)           Physical Exam  Constitutional:       General: She is not in acute distress.     Appearance: She is underweight. She is not ill-appearing or toxic-appearing.   HENT:      Head: Normocephalic and atraumatic.      Mouth/Throat:      Pharynx: Uvula midline. No pharyngeal swelling.   Cardiovascular:      Rate and Rhythm: Normal rate and regular rhythm.   Pulmonary:      Effort: Pulmonary effort is normal. No tachypnea, bradypnea, accessory muscle usage, prolonged expiration or respiratory distress.      Breath sounds: Normal breath sounds. No stridor. No wheezing, rhonchi or rales.   Neurological:      General: No focal deficit present.      Mental Status: She is alert.   Psychiatric:         Mood and Affect: Mood normal.         Behavior: Behavior normal.         ASSESSMENT & PLAN:     Age-related osteoporosis without current pathological fracture  OSTEOPOROSIS:  - Evaluated bone density and fracture risk. Reviewed her FRAX score which was very high  - Recommend denosumab (Prolia) over bisphosphonates due to her low kidney function.  - Explained osteoporosis, its implications for fracture risk, and the two main medication groups: bisphosphonates and denosumab.  - Discussed Prolia's mechanism of action and potential side effects, including risk of jaw osteonecrosis and lowered calcium " levels.  - Explained the relationship between estrogen levels, menopause, and osteoporosis risk.    Below instruction was given to the patient and advised her to consider Prolia     Please take 1200 mg of calcium daily -- this is over the counter supplementation.   Along with your calcium, please take 800 IU of vitamin D every day. This is also an over the counter supplementation.     Weight-bearing exercise will be helpful. Some examples of weight-bearing exercise including walking, strength training such as resistance exercise using weights and resistant bands.  Stair climbing as well as polina chi are also good weight-bearing exercise.    Prolia (denosumab): works to help stop the development of bone-removing cells. Without prolia, an excess of bone-removing cells in your body makes you lose bone faster than your body can rebuild it. This increases risk of fracture    Efficacy: Studies have shown that denosumab is more effective than bisphosphonates at increasing bone mineral density (BMD) at the spine and hip.     Side Effects: The most common side effects include low calcium levels, increased risk of infections, and jaw necrosis (a rare but serious condition).     Mixed hyperlipidemia  ALLERGIC REACTION:  - Considered patient's history of allergic reaction to rosuvastatin, following up on Dr. Jurado's suggestion.  - Discontinued rosuvastatin due to significant allergic reaction.      Gabby Botello MD  Family Medicine  [facility]     This note was created using [employer] voice recognition software that occasionally misinterprets phrases or words

## 2025-02-26 ENCOUNTER — PATIENT MESSAGE (OUTPATIENT)
Dept: FAMILY MEDICINE | Facility: CLINIC | Age: 80
End: 2025-02-26
Payer: MEDICARE

## 2025-02-26 DIAGNOSIS — M81.8 OTHER OSTEOPOROSIS WITHOUT CURRENT PATHOLOGICAL FRACTURE: Primary | ICD-10-CM

## 2025-02-28 ENCOUNTER — TELEPHONE (OUTPATIENT)
Dept: PULMONOLOGY | Facility: CLINIC | Age: 80
End: 2025-02-28
Payer: MEDICARE

## 2025-03-10 ENCOUNTER — INFUSION (OUTPATIENT)
Dept: INFUSION THERAPY | Facility: HOSPITAL | Age: 80
End: 2025-03-10
Attending: STUDENT IN AN ORGANIZED HEALTH CARE EDUCATION/TRAINING PROGRAM
Payer: MEDICARE

## 2025-03-10 VITALS
TEMPERATURE: 97 F | RESPIRATION RATE: 18 BRPM | HEART RATE: 72 BPM | DIASTOLIC BLOOD PRESSURE: 70 MMHG | BODY MASS INDEX: 18.44 KG/M2 | HEIGHT: 67 IN | OXYGEN SATURATION: 100 % | SYSTOLIC BLOOD PRESSURE: 113 MMHG | WEIGHT: 117.5 LBS

## 2025-03-10 DIAGNOSIS — M81.8 OTHER OSTEOPOROSIS WITHOUT CURRENT PATHOLOGICAL FRACTURE: Primary | ICD-10-CM

## 2025-03-10 PROCEDURE — 63600175 PHARM REV CODE 636 W HCPCS: Mod: JZ,TB | Performed by: STUDENT IN AN ORGANIZED HEALTH CARE EDUCATION/TRAINING PROGRAM

## 2025-03-10 PROCEDURE — 96372 THER/PROPH/DIAG INJ SC/IM: CPT

## 2025-03-10 RX ADMIN — DENOSUMAB 60 MG: 60 INJECTION SUBCUTANEOUS at 03:03

## 2025-03-10 NOTE — PLAN OF CARE
Problem: Fall Injury Risk  Goal: Absence of Fall and Fall-Related Injury  Outcome: Progressing  Intervention: Identify and Manage Contributors  Flowsheets (Taken 3/10/2025 1550)  Medication Review/Management: medications reviewed  Intervention: Promote Injury-Free Environment  Flowsheets (Taken 3/10/2025 1550)  Safety Promotion/Fall Prevention: assistive device/personal item within reach

## 2025-03-13 ENCOUNTER — OFFICE VISIT (OUTPATIENT)
Dept: PULMONOLOGY | Facility: CLINIC | Age: 80
End: 2025-03-13
Payer: MEDICARE

## 2025-03-13 VITALS
HEIGHT: 67 IN | WEIGHT: 118.94 LBS | SYSTOLIC BLOOD PRESSURE: 119 MMHG | BODY MASS INDEX: 18.67 KG/M2 | HEART RATE: 59 BPM | DIASTOLIC BLOOD PRESSURE: 68 MMHG | OXYGEN SATURATION: 98 %

## 2025-03-13 DIAGNOSIS — R06.09 DOE (DYSPNEA ON EXERTION): ICD-10-CM

## 2025-03-13 DIAGNOSIS — J90 BILATERAL PLEURAL EFFUSION: Primary | ICD-10-CM

## 2025-03-13 DIAGNOSIS — I50.32 CHRONIC DIASTOLIC HEART FAILURE WITH PRESERVED EJECTION FRACTION: ICD-10-CM

## 2025-03-13 DIAGNOSIS — G47.33 OBSTRUCTIVE SLEEP APNEA: ICD-10-CM

## 2025-03-13 PROCEDURE — 3074F SYST BP LT 130 MM HG: CPT | Mod: CPTII,S$GLB,, | Performed by: INTERNAL MEDICINE

## 2025-03-13 PROCEDURE — 1157F ADVNC CARE PLAN IN RCRD: CPT | Mod: CPTII,S$GLB,, | Performed by: INTERNAL MEDICINE

## 2025-03-13 PROCEDURE — 3078F DIAST BP <80 MM HG: CPT | Mod: CPTII,S$GLB,, | Performed by: INTERNAL MEDICINE

## 2025-03-13 PROCEDURE — 99999 PR PBB SHADOW E&M-EST. PATIENT-LVL IV: CPT | Mod: PBBFAC,,, | Performed by: INTERNAL MEDICINE

## 2025-03-13 PROCEDURE — 1101F PT FALLS ASSESS-DOCD LE1/YR: CPT | Mod: CPTII,S$GLB,, | Performed by: INTERNAL MEDICINE

## 2025-03-13 PROCEDURE — 99213 OFFICE O/P EST LOW 20 MIN: CPT | Mod: S$GLB,,, | Performed by: INTERNAL MEDICINE

## 2025-03-13 PROCEDURE — 3288F FALL RISK ASSESSMENT DOCD: CPT | Mod: CPTII,S$GLB,, | Performed by: INTERNAL MEDICINE

## 2025-03-13 PROCEDURE — 1159F MED LIST DOCD IN RCRD: CPT | Mod: CPTII,S$GLB,, | Performed by: INTERNAL MEDICINE

## 2025-03-13 RX ORDER — FLUTICASONE FUROATE, UMECLIDINIUM BROMIDE AND VILANTEROL TRIFENATATE 100; 62.5; 25 UG/1; UG/1; UG/1
1 POWDER RESPIRATORY (INHALATION) DAILY
COMMUNITY
End: 2025-03-13

## 2025-03-13 RX ORDER — TRIAMTERENE/HYDROCHLOROTHIAZID 37.5-25 MG
1 TABLET ORAL DAILY
Qty: 30 TABLET | Refills: 11 | Status: SHIPPED | OUTPATIENT
Start: 2025-03-13 | End: 2026-03-13

## 2025-03-13 NOTE — PROGRESS NOTES
3/13/2025    Ayla Dela Cruz  Office Note    Chief Complaint   Patient presents with    Follow-up    Interstitial Lung Disease       HPI:     3/13/2025 pt breathing good ,didn't use trelegy much.  No edema and breathing comfortable  but sob at time.  Not on diruetic-- was reported less sob last yr on diuretic, had had bilat pleuraleffusions seen on scans going back 10+ yrs...  used trelegy with no improvement'  Pt has obs--  assists.  Seems very functional...      Pt was on dupixent for derm, stopped now.    Pt seeing dr Botello pcp.      Pulm art pressure last 7/2024 45 with good heart funciton  Uses cpap nightly over 4 hrs with good results    June 27, 2024-uses maxzide daily -- use lasix every 2-3 days.  The dyspnea has resolved.  Patient has not had shortness of breath since she last was seen in late May.  No adverse effects noted from diuretics.  She has not had a follow-up BNP.    Follow-up thyroid ultrasound was performed-nodules were stable-no biopsy was plan.  Recommendation from ENT was yearly thyroid ultrasounds.  Will order these yearly x3.  The patient's primary doctors retiring.  A new primary should follow this up.    From sticky note within the last 2 months-  Notify blood test suggest possible fluid in the lung from heart problems.  She is already on Maxzide.  She may use furosemide 20 mg once every 3 or 4 days if she has any edema.  She is to be followed up later this month.  Notify chest x-ray shows heart is a little bit enlarged.  There is evidence of she may be carrying too much fluid.  Would recommend taking Maxzide once daily, checking a BNP blood test 1 week after starting Maxzide.  She is to be seen in the office in later June.  Notify.      May 29, 2024-- pt having more np cough and some sob develop last  wk.  Morning worse.  Was on trelegy in past..  Uses propranolol last yr-- was changed from metoprolol  -- had eratic heart rate    No sinus nor viral exp..  Chr dry mouth,  declines aspiration  Chr slender...  Pt was seen Dr Freire  -- had pulm htn at that time..  Patient Instructions   You may have some worsening of copd.    Use trelegy once daily til out  If copd -- may respond to prednisone --use if trelegy helps but not controlling    You decline beneift from prior inhalers -- xopenex   and           Chest xray 10/2023 had mild increased lung markings, ct  had bilateral pleural effusions-- viewed.  Echo had good function, leaky valves, and pulmonary hypertension.  Do chest xray if not clearing.    Propranolol may worsen lungs -- was tolerated til recently        Check blood work -- cbc, cmp, crp, and bnp- if not clearing..      Prior breathing test did not have optimal effort-- lungs may not be too too bad..        Below from Dr. Solares:  2023- pt previously followed by Marichuy Mcelroy and saw me several yrs ago. She has HFpEF, pulmonary HTN, atrial fib, mild asthma, hx DVT on eliquis.   She has not had sleep study. She snores a lot. Denies daytime fatigue but dozes off during the day. Denies nocturnal arousals.  Overnight pulse ox study 2022 showed min sat 89% on RA.  Spouse is present and speaks for her- states she needs a check up. She is breathing well. Not needing inhalers. Denies cough, wheezing, respiratory infections.  When she walks she gets out of breath- from car to [employer]. This is about the same over time.  She feels heartbeat going faster about once monthly. She has a pacemaker and followed by Dr. Jurado.    Patient Instructions   Continue follow up with cardiology for pulmonary hypertension and heart rhythm issues  Home sleep study recommended and treatment with CPAP if abnormal.  2022- complaint of shortness of breath, varies in severity, followed by cardiologist. Difficult to walk even short distances with out stopping to rest, improves with rest.   Stopped Trelegy due to oral thrush.     Did not have Sleep study not sure why, had  overnight pulse ox with no desaturation.     8/9/2022- recent right eye surgery July 2022; holding Trelegy due to inhaled steroid. Cough is resolved.    states loud snoring at night, complaint of daytime fatigue, worsening with time. Wakes up with generalized body aches and numbness in lower arms.     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 Has resolved. Followed by Dr. Jurado and Dr. Yeh cardiologists. Noticed breathing improved following procedure.  SOB- stable, not needing albuterol inhaler. Able to due daily activities with out stopping to rest as long as she takes her time. Stopped Trelegy for now.   Cough- recurrent complaint, non productive, no current cough.   Able to play flute with difficulty.    8/6/2021- states feeling well, Cough- decreased, states mild, associated with post nasal drip, recurrent complaint, non productive, using Trelegy most days with noticed benfit,   Shortness of breath- recurrent complaint, improves with albuterol rescue inhaler but not needing, going to gym to exercise with no difficulty.   GERD symptoms controlled on medications.     1/26/21-  Complaint of depression currently followed by Psychiatry. Depression medications worsens acid reflux. GERD worsens Asthma symptoms.   Cough- worsened since changing medications, causes gaging,voice is hoarse in last 2 weeks. Occasionally productive clear mucous.   Associated with chest tightness.  No wheeze, SOB- worsened, worse when changing positions, feels palpitations in chest.     07/21/2020- patient is a 75-year-old female with atrial fib, DVT (2014) on pradaxa, kidney infarction, history of stroke, GERD, hyperlipidemia who follows with Dr. Bryson for asthma and chronic sinusitis.  She c/o chronic AVILA especially if she has to walk uphill or push baby stroller. She is improved after using breo. She had URI symptoms in 1/2020 and thinks she may  have had COVID-19 with emesis, loss of smell and tasted, aching for about 3 wks.  She had antibody test which was negative. Her  was sick as well. He assists w/ history.  She is a never smoker but says she has been diagnosed with COPD. Sinuses doing well, uses flonase intermittently. She follows w/ cardiology, has had 3 ablations in the past and sometimes still has a fib.  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    Mark 15, 2019- breo  With  No rescue use, uses flonase, had syncope with  Ankle  Fx.      Oct 16, 2018 pt having sob.  Pt had asthma as child and outgrew.  Pt has had agarwal 2 yrs, no seasonal variation, no clear nocturnal worsening.  Pt has been on intermittent amiodarone with eval /rx Dr Spaulding.  Pt had a fib resolved.  pft in 2014 with vc 35%.  Pt had 3 ablations. On chr xarelto.  No h/o pe.  No edema.  On aldactone.  Pt had transient right paresis - resolved - tia/stroke.    Pt in er 10/14- no wheezes, place empirically on prednisone 40/d with good response.  With albuterol use once breathing felt nl - 1st within last 1-2 yrs.        The chief complaint problem is stable    PFSH:  Past Medical History:   Diagnosis Date    Anticoagulant long-term use     Anxiety     Arthritis     Atrial fibrillation     Basal cell carcinoma     Cancer     skin    CHF (congestive heart failure)     Depression     DVT (deep venous thrombosis)     GERD (gastroesophageal reflux disease)     Glaucoma (increased eye pressure)     Hyperlipidemia     diet controlled    Hypertension     Interstitial lung disease     Localized hives 01/10/2020    Mild persistent asthma without complication 11/12/2018    Pacemaker     Pneumonia 01/31/2014    Stroke 06/03/2014    Stroke     TIA (transient ischemic attack)     TIA (transient ischemic attack)          Past Surgical History:   Procedure Laterality Date    2 heart ablations      3 in total    A-V CARDIAC PACEMAKER INSERTION Left  10/14/2021    Procedure: INSERTION, CARDIAC PACEMAKER, DUAL CHAMBER;  Surgeon: Fco Calderon MD;  Location: [facility] EP LAB;  Service: Cardiology;  Laterality: Left;  PAF/ [place] Arrthymias, Dual PPM, SJM, MAC, GP, 3 PREP    ANTERIOR VITRECTOMY Right 7/27/2022    Procedure: VITRECTOMY, ANTERIOR APPROACH;  Surgeon: Daniel Tan MD;  Location: [facility] OR;  Service: Ophthalmology;  Laterality: Right;    bilateral cataracts      CHOLECYSTECTOMY      COLONOSCOPY N/A 1/19/2017    Procedure: COLONOSCOPY and EGD;  Surgeon: Jonathan Schultz MD;  Location: [facility] ENDO;  Service: Endoscopy;  Laterality: N/A;    COLONOSCOPY N/A 10/10/2019    Procedure: COLONOSCOPY;  Surgeon: Alex Christian MD;  Location: [facility];  Service: Endoscopy;  Laterality: N/A;    ESOPHAGOGASTRODUODENOSCOPY N/A 10/14/2019    Procedure: EGD (ESOPHAGOGASTRODUODENOSCOPY);  Surgeon: Alex Christian MD;  Location: [facility];  Service: Endoscopy;  Laterality: N/A;    ESOPHAGOGASTRODUODENOSCOPY N/A 1/25/2024    Procedure: EGD (ESOPHAGOGASTRODUODENOSCOPY);  Surgeon: Alex Christian MD;  Location: [facility];  Service: Endoscopy;  Laterality: N/A;    INJECTION OF ANESTHETIC AGENT AROUND MEDIAL BRANCH NERVES INNERVATING CERVICAL FACET JOINT Right 6/7/2018    Procedure: BLOCK, NERVE, FACET JOINT, MEDIAL BRANCH, CERVICAL;  Surgeon: Galo Clancy MD;  Location: [facility] beds at [facility];  Service: Pain Management;  Laterality: Right;  C4, 5, 6    OPEN REDUCTION AND INTERNAL FIXATION (ORIF) OF INJURY OF ANKLE Right 11/1/2018    Procedure: ORIF, ANKLE;  Surgeon: Wilfredo Aguero MD;  Location: [facility];  Service: Orthopedics;  Laterality: Right;    PARACENTESIS, EYE, ANTERIOR CHAMBER, WITH AQUEOUS REMOVAL Right 7/27/2022    Procedure: Anterior chamber washout, possible IOL removal;  Surgeon: Daniel Tan MD;  Location: [facility] beds at the [facility];  Service: Ophthalmology;  Laterality: Right;    pyloristenosis      RADIOFREQUENCY ABLATION OF LUMBAR MEDIAL BRANCH NERVE AT SINGLE LEVEL Bilateral 9/21/2018     Procedure: RADIOFREQUENCY ABLATION, NERVE, SPINAL, LUMBAR, MEDIAL BRANCH, 1 LEVEL;  Surgeon: Galo Clancy MD;  Location: [facility] OR;  Service: Pain Management;  Laterality: Bilateral;  L3, 4, 5    RADIOFREQUENCY ABLATION OF LUMBAR MEDIAL BRANCH NERVE AT SINGLE LEVEL Bilateral 2/19/2019    Procedure: Radiofrequency Ablation, Nerve, Spinal, Lumbar, Medial Branch, 1 Level;  Surgeon: Galo Clancy MD;  Location: [facility] OR;  Service: Pain Management;  Laterality: Bilateral;  L3, 4, 5     RADIOFREQUENCY ABLATION OF LUMBAR MEDIAL BRANCH NERVE AT SINGLE LEVEL Bilateral 3/10/2020    Procedure: Radiofrequency Ablation, Nerve, Spinal, Lumbar, Medial Branch, 1 Level;  Surgeon: Galo Clancy MD;  Location: [facility] OR;  Service: Pain Management;  Laterality: Bilateral;  L3,4,5 - Burned at 80 degrees C. for 60 seconds x 2 each site      RADIOFREQUENCY ABLATION OF LUMBAR MEDIAL BRANCH NERVE AT SINGLE LEVEL Bilateral 9/11/2020    Procedure: Radiofrequency Ablation, Nerve, Spinal, Lumbar, Medial Branch, 1 Level;  Surgeon: Galo Clancy MD;  Location: [facility] OR;  Service: Pain Management;  Laterality: Bilateral;  L3, 4, 5 - Burned at 80 degrees C. for 60 seconds x 2 each site    RADIOFREQUENCY ABLATION OF LUMBAR MEDIAL BRANCH NERVE AT SINGLE LEVEL Bilateral 5/28/2021    Procedure: Radiofrequency Ablation, Nerve, Spinal, Lumbar, Medial Branch, 1 Level;  Surgeon: Galo Clancy MD;  Location: [facility] OR;  Service: Pain Management;  Laterality: Bilateral;  L3,4,5    RADIOFREQUENCY THERMAL COAGULATION OF MEDIAL BRANCH OF POSTERIOR RAMUS OF CERVICAL SPINAL NERVE Right 7/3/2018    Procedure: RADIOFREQUENCY THERMAL COAGULATION, NERVE, SPINAL, CERVICAL, MEDIAL BRANCH OF POSTERIOR RAMUS;  Surgeon: Galo Clancy MD;  Location: [facility] OR;  Service: Pain Management;  Laterality: Right;  C4,5,6 - Burned at 80 degrees C. for 75 seconds each site    RADIOFREQUENCY THERMAL COAGULATION OF MEDIAL BRANCH OF POSTERIOR RAMUS OF CERVICAL SPINAL NERVE Right 7/23/2019    Procedure:  RADIOFREQUENCY THERMAL COAGULATION, NERVE, SPINAL, CERVICAL, POSTERIOR RAMUS, MEDIAL BRANCH;  Surgeon: Galo Clancy MD;  Location: [facility] OR;  Service: Pain Management;  Laterality: Right;  C4,5,6    RADIOFREQUENCY THERMAL COAGULATION OF MEDIAL BRANCH OF POSTERIOR RAMUS OF CERVICAL SPINAL NERVE Right 6/23/2020    Procedure: RADIOFREQUENCY THERMAL COAGULATION, NERVE, SPINAL, CERVICAL, POSTERIOR RAMUS, MEDIAL BRANCH;  Surgeon: Galo Clancy MD;  Location: [facility] OR;  Service: Pain Management;  Laterality: Right;  C4, 5, 6    RADIOFREQUENCY THERMOCOAGULATION Bilateral 9/10/2019    Procedure: RADIOFREQUENCY THERMAL COAGULATION LUMBAR;  Surgeon: Galo Clancy MD;  Location: [facility] OR;  Service: Pain Management;  Laterality: Bilateral;  L3,4,5 - Burned at 80 degrees C. for 60 seconds x 2 each site    skin cancer removal       TONSILLECTOMY       Social History     Tobacco Use    Smoking status: Never    Smokeless tobacco: Never   Substance Use Topics    Alcohol use: No    Drug use: No     Family History   Problem Relation Name Age of Onset    Arthritis Mother      Asthma Mother      Rheum arthritis Mother      Pneumonia Mother      Skin cancer Mother          unsure of type    Heart disease Father      Ulcers Father      Depression Son      Alzheimer's disease Maternal Uncle      Rheum arthritis Maternal Grandmother      Emphysema Maternal Grandfather      Cancer Maternal Grandfather          kidney    Kidney disease Maternal Grandfather      Cancer Paternal Grandmother          lung= smoker    Pneumonia Paternal Grandfather      Breast cancer Neg Hx      Ovarian cancer Neg Hx       Review of patient's allergies indicates:   Allergen Reactions    Gabapentin Hallucinations    Xarelto [rivaroxaban] Rash    Bactrim [sulfamethoxazole-trimethoprim] Other (See Comments)     Upset stomach, dry heaves, confusion    Amoxicillin-pot clavulanate      Other reaction(s): Mental Status Change    Atorvastatin      Other reaction(s): Joint pain     "Baclofen     Baclofen (bulk) Nausea And Vomiting    Ciprofloxacin     Decongest tabs      Other reaction(s): increased heart rate    Decongestant [pseudoephedrine hcl]     Erythromycin Other (See Comments)    Flecainide Hives     And SOB. No reaction to Lidocaine     Fluoxetine      Other reaction(s): heart palpitations  Other reaction(s): anxiety    Lisinopril Other (See Comments)     cough    Losartan Other (See Comments)     Hypotension with lightheadedness    Morphine Other (See Comments)     confusion    Tramadol Other (See Comments)     SOB, low BP    Venlafaxine     Venlafaxine analogues      Changes in BP and increases heart rate       Caffeine Palpitations    Dabigatran etexilate Rash    Plavix [clopidogrel] Rash    Tizanidine Anxiety     dizziness       Performance Status:The patient's activity level is functions out of house.      Review of Systems:  a review of eleven systems covering constitutional, Eye, HEENT, Psych, Respiratory, Cardiac, GI, , Musculoskeletal, Endocrine, Dermatologic was negative except for pertinent findings as listed ABOVE and below:  Dry mouth  Gaining a little weight  Appetite decreased    Exam:Comprehensive exam done. /68 (BP Location: Left arm, Patient Position: Sitting)   Pulse (!) 59   Ht 5' 7" (1.702 m)   Wt 53.9 kg (118 lb 15 oz)   SpO2 98% Comment: on room air at rest  BMI 18.63 kg/m²   Exam included Vitals as listed, and patient's appearance and affect and alertness and mood, oral exam for yeast and hygiene and pharynx lesions and Mallapatti (M) score, neck with inspection for jvd and masses and thyroid abnormalities and lymph nodes (supraclavicular and infraclavicular nodes and axillary also examined and noted if abn), chest exam included symmetry and effort and fremitus and percussion and auscultation, cardiac exam included rhythm and gallops and murmur and rubs and jvd and edema, abdominal exam for mass and hepatosplenomegaly and tenderness and hernias and " bowel sounds, Musculoskeletal exam with muscle tone and posture and mobility/gait and  strength, and skin for rashes and cyanosis and pallor and turgor, extremity for clubbing.  Findings were normal except for pertinent findings listed below:  M2, thin, oropharynx dry  HR regular  Breath sounds clear bilaterally  No edema/clubbing    Radiographs (ct chest and cxr) reviewed: view by direct vision and interpreted    X-Ray Chest AP Portable   11/15/2021 enlarged heart, lungs clear    XR CHEST AP PORTABLE  10/14/2021 mild pulmonary edema     CXR 1/23/20- cardiomegaly, prominence of interstitial markings, peribronchial cuffing, RLL  infiltrates  CTA chest 2014- mosaicism consistent with air trapping, bibasilar infiltrates, small right pleural effusion, cardiomegaly with enlarged RV    TTE 9/8/22-   The left ventricle is normal in size with moderate concentric hypertrophy and normal systolic function.  The estimated ejection fraction is 60%.  Indeterminate left ventricular diastolic function.  Normal right ventricular size with normal right ventricular systolic function.  Mild left atrial enlargement.  Moderate right atrial enlargement.  Mild aortic regurgitation.  Mild mitral regurgitation.  Moderate tricuspid regurgitation.  Mild pulmonic regurgitation.  Normal central venous pressure (3 mmHg).  The estimated PA systolic pressure is 60 mmHg.  There is pulmonary hypertension.       Labs reviewed     Latest Reference Range & Units 04/09/14 08:15   LAWRENCE Negative <1:160  Negative <1:160   Rheumatoid Factor 0.0 - 15.0 IU/mL <10.0     Lab Results   Component Value Date    WBC 10.79 01/29/2025    HGB 11.2 (L) 01/29/2025    HCT 35.7 (L) 01/29/2025     (H) 01/29/2025     01/29/2025       CMP  Sodium   Date Value Ref Range Status   01/29/2025 136 136 - 145 mmol/L Final     Potassium   Date Value Ref Range Status   01/29/2025 4.0 3.5 - 5.1 mmol/L Final     Chloride   Date Value Ref Range Status   01/29/2025 99 95  - 110 mmol/L Final     CO2   Date Value Ref Range Status   01/29/2025 25 23 - 29 mmol/L Final     Glucose   Date Value Ref Range Status   01/29/2025 77 70 - 110 mg/dL Final     BUN   Date Value Ref Range Status   01/29/2025 32 (H) 8 - 23 mg/dL Final     Creatinine   Date Value Ref Range Status   01/29/2025 1.2 0.5 - 1.4 mg/dL Final   09/24/2012 0.6 0.2 - 1.4 mg/dl Final     Calcium   Date Value Ref Range Status   01/29/2025 9.2 8.7 - 10.5 mg/dL Final   09/24/2012 9.9 8.6 - 10.2 mg/dl Final     Total Protein   Date Value Ref Range Status   01/29/2025 7.0 6.0 - 8.4 g/dL Final     Albumin   Date Value Ref Range Status   01/29/2025 3.5 3.5 - 5.2 g/dL Final     Total Bilirubin   Date Value Ref Range Status   01/29/2025 0.8 0.1 - 1.0 mg/dL Final     Comment:     For infants and newborns, interpretation of results should be based  on gestational age, weight and in agreement with clinical  observations.    Premature Infant recommended reference ranges:  Up to 24 hours.............<8.0 mg/dL  Up to 48 hours............<12.0 mg/dL  3-5 days..................<15.0 mg/dL  6-29 days.................<15.0 mg/dL       Alkaline Phosphatase   Date Value Ref Range Status   01/29/2025 79 40 - 150 U/L Final   09/24/2012 63 23 - 119 UNIT/L Final     AST   Date Value Ref Range Status   01/29/2025 46 (H) 10 - 40 U/L Final   09/24/2012 26 10 - 30 UNIT/L Final     ALT   Date Value Ref Range Status   01/29/2025 25 10 - 44 U/L Final     Anion Gap   Date Value Ref Range Status   01/29/2025 12 8 - 16 mmol/L Final   09/24/2012 12 5 - 15 meq/L Final     eGFR   Date Value Ref Range Status   01/29/2025 45.5 (A) >60 mL/min/1.73 m^2 Final             PFT results reviewed 2014- spirometry shows severe restriction.  Lung volumes and DLCO were not performed        over night pulse ox  <88% 0 min 8/14/22    Plan:  Clinical impression is apparently straight forward and impression with management as below.   PH suspect group 2, NYHA II- with no progressive  symptoms, no O2 reqt- follows w/ cardiology  R/o NILSA    Ayla was seen today for follow-up and interstitial lung disease.    Diagnoses and all orders for this visit:    Bilateral pleural effusion  -     triamterene-hydrochlorothiazide 37.5-25 mg (MAXZIDE-25) 37.5-25 mg per tablet; Take 1 tablet by mouth once daily.  -     X-Ray Chest PA And Lateral; Future    Chronic diastolic heart failure with preserved ejection fraction  -     triamterene-hydrochlorothiazide 37.5-25 mg (MAXZIDE-25) 37.5-25 mg per tablet; Take 1 tablet by mouth once daily.  -     X-Ray Chest PA And Lateral; Future    AVILA (dyspnea on exertion)  -     triamterene-hydrochlorothiazide 37.5-25 mg (MAXZIDE-25) 37.5-25 mg per tablet; Take 1 tablet by mouth once daily.  -     X-Ray Chest PA And Lateral; Future    Obstructive sleep apnea  -     CPAP/BIPAP SUPPLIES              Follow up in about 1 year (around 3/13/2026), or if symptoms worsen or fail to improve.    Discussed with patient above for education the following:            Patient Instructions   Chest xray suggest fluid in and around lung.  Not worse than prior xrays -- chest xray in 2012 did look better.   Would dose fluid pill as needed if edema/more short breath    Inhaler not helping     Cpap needed-- sleep study in 2023 had ahi 45 times hourly.   Will ask staff to get compliance report.  Cpap supplies ordered.   You are using4 + hours nighlty with subjective benefit      Would check chest xray in 6 months or if more short breath    Lung tissue looked good on prior ct and chest xray last yr...    Aliceal took 25 min

## 2025-03-13 NOTE — PATIENT INSTRUCTIONS
Chest xray suggest fluid in and around lung.  Not worse than prior xrays -- chest xray in 2012 did look better.   Would dose fluid pill as needed if edema/more short breath    Inhaler not helping     Cpap needed-- sleep study in 2023 had ahi 45 times hourly.   Will ask staff to get compliance report.  Cpap supplies ordered.   You are using4 + hours nighlty with subjective benefit      Would check chest xray in 6 months or if more short breath    Lung tissue looked good on prior ct and chest xray last yr...

## 2025-03-27 ENCOUNTER — PATIENT MESSAGE (OUTPATIENT)
Dept: CARDIOLOGY | Facility: CLINIC | Age: 80
End: 2025-03-27
Payer: MEDICARE

## 2025-03-27 DIAGNOSIS — I50.32 CHRONIC DIASTOLIC HEART FAILURE: ICD-10-CM

## 2025-03-27 DIAGNOSIS — R06.02 SOB (SHORTNESS OF BREATH): Primary | ICD-10-CM

## 2025-03-28 ENCOUNTER — RESULTS FOLLOW-UP (OUTPATIENT)
Dept: CARDIOLOGY | Facility: HOSPITAL | Age: 80
End: 2025-03-28

## 2025-03-28 ENCOUNTER — HOSPITAL ENCOUNTER (OUTPATIENT)
Dept: RADIOLOGY | Facility: CLINIC | Age: 80
Discharge: HOME | End: 2025-03-28
Attending: INTERNAL MEDICINE
Payer: MEDICARE

## 2025-03-28 DIAGNOSIS — I50.32 CHRONIC DIASTOLIC HEART FAILURE: ICD-10-CM

## 2025-03-28 DIAGNOSIS — R06.02 SOB (SHORTNESS OF BREATH): ICD-10-CM

## 2025-03-28 PROCEDURE — 71046 X-RAY EXAM CHEST 2 VIEWS: CPT | Mod: 26,,, | Performed by: RADIOLOGY

## 2025-03-28 PROCEDURE — 71046 X-RAY EXAM CHEST 2 VIEWS: CPT | Mod: TC,FY,PO

## 2025-03-31 ENCOUNTER — CLINICAL SUPPORT (OUTPATIENT)
Dept: OPHTHALMOLOGY | Facility: CLINIC | Age: 80
End: 2025-03-31
Payer: MEDICARE

## 2025-03-31 ENCOUNTER — OFFICE VISIT (OUTPATIENT)
Dept: OPHTHALMOLOGY | Facility: CLINIC | Age: 80
End: 2025-03-31
Payer: MEDICARE

## 2025-03-31 DIAGNOSIS — H40.1112 PRIMARY OPEN ANGLE GLAUCOMA (POAG) OF RIGHT EYE, MODERATE STAGE: Primary | ICD-10-CM

## 2025-03-31 DIAGNOSIS — T85.398A UVEITIS-HYPHEMA-GLAUCOMA SYNDROME OF RIGHT EYE: ICD-10-CM

## 2025-03-31 DIAGNOSIS — H20.9 UVEITIS-HYPHEMA-GLAUCOMA SYNDROME OF RIGHT EYE: ICD-10-CM

## 2025-03-31 DIAGNOSIS — H40.41X0 UVEITIS-HYPHEMA-GLAUCOMA SYNDROME OF RIGHT EYE: ICD-10-CM

## 2025-03-31 DIAGNOSIS — H40.1121 PRIMARY OPEN-ANGLE GLAUCOMA, LEFT EYE, MILD STAGE: ICD-10-CM

## 2025-03-31 PROCEDURE — G2211 COMPLEX E/M VISIT ADD ON: HCPCS | Mod: S$GLB,,, | Performed by: OPHTHALMOLOGY

## 2025-03-31 PROCEDURE — 1126F AMNT PAIN NOTED NONE PRSNT: CPT | Mod: CPTII,S$GLB,, | Performed by: OPHTHALMOLOGY

## 2025-03-31 PROCEDURE — 1160F RVW MEDS BY RX/DR IN RCRD: CPT | Mod: CPTII,S$GLB,, | Performed by: OPHTHALMOLOGY

## 2025-03-31 PROCEDURE — 99213 OFFICE O/P EST LOW 20 MIN: CPT | Mod: S$GLB,,, | Performed by: OPHTHALMOLOGY

## 2025-03-31 PROCEDURE — 1101F PT FALLS ASSESS-DOCD LE1/YR: CPT | Mod: CPTII,S$GLB,, | Performed by: OPHTHALMOLOGY

## 2025-03-31 PROCEDURE — 1159F MED LIST DOCD IN RCRD: CPT | Mod: CPTII,S$GLB,, | Performed by: OPHTHALMOLOGY

## 2025-03-31 PROCEDURE — 99999 PR PBB SHADOW E&M-EST. PATIENT-LVL III: CPT | Mod: PBBFAC,,, | Performed by: OPHTHALMOLOGY

## 2025-03-31 PROCEDURE — 3288F FALL RISK ASSESSMENT DOCD: CPT | Mod: CPTII,S$GLB,, | Performed by: OPHTHALMOLOGY

## 2025-03-31 PROCEDURE — 92083 EXTENDED VISUAL FIELD XM: CPT | Mod: S$GLB,,, | Performed by: OPHTHALMOLOGY

## 2025-03-31 PROCEDURE — 1157F ADVNC CARE PLAN IN RCRD: CPT | Mod: CPTII,S$GLB,, | Performed by: OPHTHALMOLOGY

## 2025-03-31 RX ORDER — LATANOPROST 50 UG/ML
1 SOLUTION/ DROPS OPHTHALMIC DAILY
Qty: 2.5 ML | Refills: 11 | Status: SHIPPED | OUTPATIENT
Start: 2025-03-31

## 2025-03-31 NOTE — PROGRESS NOTES
HPI    DLS: 5/7/2024- rev HVF/ IOP ck     Pt states no new complaints.     Denies pain/ FOL/ floaters.       Latan qhs OU   Atropine OD BID   PF systane OU PRN     Last edited by Daniel Tan MD on 3/31/2025  1:29 PM.            Assessment /Plan     For exam results, see Encounter Report.    Primary open angle glaucoma (POAG) of right eye, moderate stage  -     Laguerre Visual Field - OU - Extended - Both Eyes  -     Laguerre Visual Field - OU - Extended - Both Eyes; Standing    Primary open-angle glaucoma, left eye, mild stage  -     Laguerre Visual Field - OU - Extended - Both Eyes  -     Laguerre Visual Field - OU - Extended - Both Eyes; Standing    Uveitis-hyphema-glaucoma syndrome of right eye      1. Primary open angle glaucoma (POAG) of right eye, moderate stage (Primary)  2. Primary open-angle glaucoma, left eye, mild stage  +famhx  Avg pachy    Tmax 20/18 per outside records  Tmax 30+ OD with hyphema  UGH syndrome and IOL malposition OD  Hx of Hyphema OD x 2, requiring washout, Cornea blood staining  HVF moderate damage OD, nearly normal OS  OCT NFL damaged OD, normal OS  Nasal congestion/runny nose with dorz/rm    IOP a bit higher OD, good OS  Will observe, no changes    Continue  Latan qhs OU    F/u 6 months, DFE, OCT NFL    Visit today is associated with current or anticipated ongoing medical care related to this patients single serious and complex condition, glaucoma.        3. Uveitis-hyphema-glaucoma syndrome of right eye  HX AC washout and surgical iridectomy OD  7/2022  Doing well, AC quiet    Continue atropine BID OD

## 2025-04-02 ENCOUNTER — CLINICAL SUPPORT (OUTPATIENT)
Dept: CARDIOLOGY | Facility: HOSPITAL | Age: 80
End: 2025-04-02
Attending: INTERNAL MEDICINE
Payer: MEDICARE

## 2025-04-02 ENCOUNTER — CLINICAL SUPPORT (OUTPATIENT)
Dept: CARDIOLOGY | Facility: HOSPITAL | Age: 80
End: 2025-04-02
Payer: MEDICARE

## 2025-04-02 DIAGNOSIS — I48.91 UNSPECIFIED ATRIAL FIBRILLATION: ICD-10-CM

## 2025-04-02 DIAGNOSIS — Z95.0 PRESENCE OF CARDIAC PACEMAKER: ICD-10-CM

## 2025-04-02 PROCEDURE — 93296 REM INTERROG EVL PM/IDS: CPT | Performed by: INTERNAL MEDICINE

## 2025-04-02 PROCEDURE — 93294 REM INTERROG EVL PM/LDLS PM: CPT | Mod: ,,, | Performed by: INTERNAL MEDICINE

## 2025-04-11 LAB
OHS CV AF BURDEN PERCENT: < 1
OHS CV DC REMOTE DEVICE TYPE: NORMAL
OHS CV ICD SHOCK: NO
OHS CV RV PACING PERCENT: 53 %

## 2025-04-12 ENCOUNTER — PATIENT MESSAGE (OUTPATIENT)
Dept: OPHTHALMOLOGY | Facility: CLINIC | Age: 80
End: 2025-04-12
Payer: MEDICARE

## 2025-04-17 ENCOUNTER — OFFICE VISIT (OUTPATIENT)
Dept: PSYCHIATRY | Facility: CLINIC | Age: 80
End: 2025-04-17
Payer: MEDICARE

## 2025-04-17 VITALS
SYSTOLIC BLOOD PRESSURE: 109 MMHG | DIASTOLIC BLOOD PRESSURE: 62 MMHG | HEIGHT: 67 IN | WEIGHT: 123 LBS | HEART RATE: 60 BPM | BODY MASS INDEX: 19.3 KG/M2

## 2025-04-17 DIAGNOSIS — F33.1 MAJOR DEPRESSIVE DISORDER, RECURRENT, MODERATE: Primary | ICD-10-CM

## 2025-04-17 DIAGNOSIS — F41.1 GENERALIZED ANXIETY DISORDER: ICD-10-CM

## 2025-04-17 DIAGNOSIS — F41.0 PANIC DISORDER: ICD-10-CM

## 2025-04-17 DIAGNOSIS — F40.298 SPECIFIC PHOBIA: ICD-10-CM

## 2025-04-17 PROCEDURE — 1159F MED LIST DOCD IN RCRD: CPT | Mod: CPTII,S$GLB,, | Performed by: PSYCHOLOGIST

## 2025-04-17 PROCEDURE — 99999 PR PBB SHADOW E&M-EST. PATIENT-LVL III: CPT | Mod: PBBFAC,,, | Performed by: PSYCHOLOGIST

## 2025-04-17 PROCEDURE — 1157F ADVNC CARE PLAN IN RCRD: CPT | Mod: CPTII,S$GLB,, | Performed by: PSYCHOLOGIST

## 2025-04-17 PROCEDURE — 99214 OFFICE O/P EST MOD 30 MIN: CPT | Mod: S$GLB,,, | Performed by: PSYCHOLOGIST

## 2025-04-17 PROCEDURE — 3288F FALL RISK ASSESSMENT DOCD: CPT | Mod: CPTII,S$GLB,, | Performed by: PSYCHOLOGIST

## 2025-04-17 PROCEDURE — 3074F SYST BP LT 130 MM HG: CPT | Mod: CPTII,S$GLB,, | Performed by: PSYCHOLOGIST

## 2025-04-17 PROCEDURE — 1126F AMNT PAIN NOTED NONE PRSNT: CPT | Mod: CPTII,S$GLB,, | Performed by: PSYCHOLOGIST

## 2025-04-17 PROCEDURE — G2211 COMPLEX E/M VISIT ADD ON: HCPCS | Mod: S$GLB,,, | Performed by: PSYCHOLOGIST

## 2025-04-17 PROCEDURE — 1101F PT FALLS ASSESS-DOCD LE1/YR: CPT | Mod: CPTII,S$GLB,, | Performed by: PSYCHOLOGIST

## 2025-04-17 PROCEDURE — 3078F DIAST BP <80 MM HG: CPT | Mod: CPTII,S$GLB,, | Performed by: PSYCHOLOGIST

## 2025-04-17 RX ORDER — BUSPIRONE HYDROCHLORIDE 10 MG/1
10 TABLET ORAL 3 TIMES DAILY
Qty: 270 TABLET | Refills: 0 | Status: SHIPPED | OUTPATIENT
Start: 2025-04-17

## 2025-04-17 RX ORDER — VORTIOXETINE 20 MG/1
20 TABLET, FILM COATED ORAL DAILY
Qty: 90 TABLET | Refills: 0 | Status: SHIPPED | OUTPATIENT
Start: 2025-04-17

## 2025-04-17 RX ORDER — MIRTAZAPINE 7.5 MG/1
7.5 TABLET, FILM COATED ORAL NIGHTLY
Qty: 90 TABLET | Refills: 0 | Status: SHIPPED | OUTPATIENT
Start: 2025-04-17 | End: 2026-04-17

## 2025-04-17 RX ORDER — LORAZEPAM 0.5 MG/1
0.5 TABLET ORAL DAILY PRN
Qty: 30 TABLET | Refills: 0 | Status: SHIPPED | OUTPATIENT
Start: 2025-04-17

## 2025-04-17 NOTE — PROGRESS NOTES
"Outpatient Psychiatry Follow-Up Visit    Clinical Status of Patient: Outpatient (Ambulatory)  04/17/2025     Chief Complaint: 78 y/o female presenting today with  for a follow-up.       Interval History and Content of Current Session:  Interim Events/Subjective Report/Content of Current Session: 80 y/o female follow-up appointment.    Pt is a 78 y/o female with past psychiatric hx of depression, anxiety, eating disorder who presents for follow-up treatment. Patient's  reports recent short-term memory problems, mentioning an incident where she forgot about clothes purchased at [employer] the same day. However, her long-term memory remains intact. Her anxiety has been "good" with no current issues. She acknowledges an ongoing fear of swallowing but manages it by being careful when eating. She reports eating fine and that things taste good, though she has difficulty chewing some items like chicken. Her weight continues to improve to 123 lbs, which is within a healthy range. The clinician notes improvements in her skin appearance and energy levels.    Past Psychiatric hx: remeron, rexulti, vraylar, Limbitrol, venlafaxine, fluoxetine, Abilify, gabapentin, amitriptyline, buspirone, cannot remember others.     Past Medical hx:   Past Medical History:   Diagnosis Date    Anticoagulant long-term use     Anxiety     Arthritis     Atrial fibrillation     Basal cell carcinoma     Cancer     skin    CHF (congestive heart failure)     Depression     DVT (deep venous thrombosis)     GERD (gastroesophageal reflux disease)     Glaucoma (increased eye pressure)     Hyperlipidemia     diet controlled    Hypertension     Interstitial lung disease     Localized hives 01/10/2020    Mild persistent asthma without complication 11/12/2018    Pacemaker     Pneumonia 01/31/2014    Stroke 06/03/2014    Stroke     TIA (transient ischemic attack)     TIA (transient ischemic attack)         Interim hx:  Medication changes last visit: " none     Denies suicidal/homicidal ideations.  Denies hopelessness/worthlessness.    Denies auditory/visual hallucinations      Alcohol: Infrequent use  Drug: Pt denied  Caffeine: Not assessed  Tobacco: Pt denied      Review of Systems   PSYCHIATRIC: Pertinent items are noted in the narrative.        CONSTITUTIONAL: weight stable    Past Medical, Family and Social History: The patient's past medical, family and social history have been reviewed and updated as appropriate within the electronic medical record. See encounter notes.     Current Psychiatric Medication:  ativan 0.5 mg PRN, buspirone 10 mg TID, trintillex 20 mg, mirtazapine 7.5 mg      Compliance: yes      Side effects: Pt denied     Risk Parameters:  Patient reports no suicidal ideation  Patient reports no homicidal ideation  Patient reports no self-injurious behavior  Patient reports no violent behavior     Exam (detailed: at least 9 elements; comprehensive: all 15 elements)   Constitutional  Vitals:  Most recent vital signs, dated less than 90 days prior to this appointment, were reviewed. Pulse:  [60]   BP: (109)/(62)       General:  unremarkable, age appropriate, casual attire, good eye contact, good rapport       Musculoskeletal  Muscle Strength/Tone:  no flaccidity, no tremor    Gait & Station:  normal      Psychiatric                       Speech:  normal tone, normal rate, rhythm, and volume   Mood & Affect:   Mildly anxious         Thought Process:   Goal directed; Linear    Associations:   intact   Thought Content:   No SI/HI, delusions, or paranoia, no AV/VH   Insight & Judgement:   Good, adequate to circumstances   Orientation:   grossly intact; alert and oriented x 4    Memory:  intact for content of interview    Language:  grossly intact, can repeat    Attention Span  : Grossly intact for content of interview   Fund of Knowledge:   intact and appropriate to age and level of education        Assessment and Diagnosis   Status/Progress: Based  on the examination today, the patient's problem(s) is/are adequately controlled.  New problems have not been presented today. Co-morbidities are not complicating management of the primary condition. There are no active rule-out diagnoses for this patient at this time.      Impression: Pt continues to respond well to medication plan. Pt has returned to reducing frequency of lorazepam without difficulty. Encouraged pt to continue doing so. Will continue with medication plan and monitor symptoms moving forward. LA  checked    Diagnosis:   1) Major Depressive Disorder, recurrent, moderate  2) Generalized Anxiety Disorder  3) Specific Phobia  4) Panic Disorder   5) Personality Disorder traits  6) R/o Dementia of unknown etiology  Intervention/Counseling/Treatment Plan   Medication Management:      1. ativan 0.5 mg PRN    2. buspirone to 10 mg TID    3. trintillex 20 mg    4. mirtazapine 7.5 mg     5. Call to report any worsening of symptoms or problems with the medication. Pt instructed to go to ER with thoughts of harming self, others     6. Cont in therapy as needed    Psychotherapy: none  Target symptoms: anxiety  Why chosen therapy is appropriate versus another modality: CBT used; relevant to diagnosis, patient responds to this modality  Outcome monitoring methods: self-report, observation  Therapeutic intervention type: Cognitive Behavioral Therapy, Coping  Topics discussed/themes: building skills sets for symptom management, symptom recognition, nutrition, exercise  The patient's response to the intervention is accepting  Patient's response to treatment is: good.   The patient's progress toward treatment goals: limited to fair     Return to clinic: 3 months    -Cognitive-Behavioral/Supportive therapy and psychoeducation provided  -R/B/SE's of medications discussed with the pt who expresses understanding and chooses to take medications as prescribed.   -Pt instructed to call clinic, 911 or go to nearest emergency  room if sxs worsen or pt is in   crisis. The pt expresses understanding.    Galo Lipscomb, PhD, MP    Visit today included increased complexity associated with the care of the episodic problem depression, anxiety addressed and managing the longitudinal care of the patient due to the serious and/or complex managed problem(s) depression, anxiety.      Antidepressant/Antianxiety Medication Initiation:  Patient informed of risks, benefits, and potential side effects of medication and accepts informed consent.  Common side effects include nausea, fatigue, headache, insomnia., Specifically discussed the possibility of new or worsening suicidal thoughts/depression.  Patient instructed to stop the medication immediately and seek urgent treatment if this occurs. Patient instructed not to abruptly discontinue medication without physician guidance except in cases of sudden onset or worsening of SI.       Stimulant Medication Initiation:  Patient advised of risks, benefits, and side effects of medication and accepts informed consent.  Common side effects include insomnia, irritability, jittery feeling, dry mouth, and agitation/hostility., Patient advised of potential addictive nature of medication and controlled substance classification.  Instructed to safeguard medication as no early refills can be given for lost or stolen medications.       Benzodiazepine Initiation:  Patient advised of the risks, benefits, and common side effects of medication and has accepted informed consent.  Common side effects include drowsiness, impaired coordination, possible memory loss., Patient advised NOT to operate a vehicle or machinery untiil they are sure how the medication will affec them.  Client also advised of danger of mixing this medication with alcohol., Patient advised of potential addictive nature of medication and need to safeguard medication as no early refills for lost or stolen medications can be authorized.

## 2025-04-21 ENCOUNTER — PATIENT MESSAGE (OUTPATIENT)
Dept: FAMILY MEDICINE | Facility: CLINIC | Age: 80
End: 2025-04-21
Payer: MEDICARE

## 2025-04-21 DIAGNOSIS — I48.0 PAROXYSMAL ATRIAL FIBRILLATION: ICD-10-CM

## 2025-05-05 DIAGNOSIS — Z95.0 CARDIAC PACEMAKER IN SITU: Primary | ICD-10-CM

## 2025-05-05 NOTE — PROGRESS NOTES
aSubjective:      Cardiologist: Barrett Jurado MD    I had the pleasure of seeing Ayla Dela Cruz in follow up for her history of atrial arrhythmias and PVI. She last saw me in 3/2024. She is an 80 year old female with HTN, HLD, and TIA in 2014, whose history of arrhythmias dates back to her 30s when she began to experience sudden onset palpitations. She was started on Tambacor at that time, which improved her symptoms. She was started on Coumadin at age 55 years. In the mid-1990s, she underwent an ablation for what sounds like atrial flutter in [place], [place]. In 1997 she underwent a second ablation which she was told was unsuccessful. Since that time she has undergone two electrical cardioversions, once in the mid-2000s (which lasted 5 years), and another around 2010. Around 2014 her arrhythmias recurred around the time she had her TIA, for which she was placed on Xarelto.    I reviewed all ECGs in the EMR at her initial office visit. ECGs from 5793-6610 showed sinus rhythm. ECGs from 1/2014 and 4/2014 showed AF. ECGs between 6/2014 and 1/2016 showed sinus bradycardia and NSR. All ECGs between 1/16/2016 and 5/2017 showed either AF or AFL. When in AFL, RVR was generally seen.    In 9/2017, a PVI was performed. All four PVs were reconnected from a prior PVI, and successfully reisolated. A septal MA flutter line was also created due to frequent inductions during the case. The existing CTI-line was found to be intact. She was discharged on Amiodarone 100 mg daily. At her 11/2017 follow-up, Ms. Dela Cruz was feeling well apart from occasional AVILA relieved with lasix. Her energy level had vastly improved following ablation. Amiodarone was stopped at that visit (I was concerned it was causing her intermittent AVILA). Stopping Amiodarone improved her symptoms. A 48 hour Holter monitor performed in 11/2017 showed sinus rhythm with an average HR of 58 bpm.     At her 2/2018 visit Ms. Dela Cruz continued to feel well,  with no complaints. However, beginning in early 8/2018, Ms. Dela Cruz began to note significant AVILA, lightheadedness, and dizziness, which temporally correlated with her starting Tramadol. She had been off this medication for a few days. She also cut losartan from 100 mg to 50 mg once daily on her own. At her 9/2018 visit I reviewed recent HR and BP trends, with SBPs frequently in the 80-90s mmHg range. At that visit I stopped losartan. A 48 hour Holter monitor showed sinus rhythm with an average HR of 63 bpm, with a 6.6% PAC burden.     At her visit in 11/2018, Ms. Dela Cruz had an episode of near syncope in [facility], which resulted in a right ankle break for which she required surgery. Otherwise she was feeling much better, with improved breathing and lightheadedness.    At her 1/2020 visit, she described multiple issues with NOACs. She specifically described pruritis on Xarelto, and pruritis on Eliquis. At the time she was on Pradaxa and tolerating it. Ms. 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    At her 11/2020 visit Ms. Dela Cruz was tolerating pradaxa, and denied GI bleeding events. She had irregular heartbeats on occasion but not palpitations. At her request, pradaxa was switched back to eliquis.     ECGs from earlier in 2021 show profound sinus bradycardia with occasional extrasystoles and compensatory pauses (beta blocker has since been down-titrated). In 9/2021 Ms. Dela Cruz [facility] to [facility] with palpitations and skipped beats. The initial ECG was consistent with type 1 second-degree AVB with occasional extraystoles, the second consistent with a paroxysms of atypical flutter. ECGs associated with a negative NST also shows periods consistent with atypical flutter.    At her last visit in 9/2021 we discussed options including re-do PVI vs [employer] dual chamber pacemaker implantation. She chose the latter, which was performed in  10/2021. She was not started on an antiarrhythmic post-procedure.    At her 3/2022 visit, a device check showed 10 AMS episodes (longest 19 minutes), AF burden <1%. Plan at that time was to start sotalol if AF burden began to increase. At 3/2023 visit device check showed 18 AMS episodes (longest 2 minutes). Plan was to hold the course. At her 3/2024 visit a device check showed 8 AMS episodes since 11/2023 (longest 1 minute).    Recent device check showed no AF over past year, but gradual increase in RVp starting around 10/2024.     I reviewed today's device check shows stable device/lead function, AF burden <1%, RVp 27% since 4/2/2025, battery 6.5-7 yrs.    Echo in 7/2024 showed EF 60-65%.    Today in the office Ms. Dela Cruz feels well, no complaints although chronically SOB.    Review of Systems   Constitutional: Negative for decreased appetite, malaise/fatigue, weight gain and weight loss.   HENT:  Negative for sore throat.    Eyes:  Negative for blurred vision.   Cardiovascular:  Negative for chest pain, dyspnea on exertion, irregular heartbeat, leg swelling, near-syncope, orthopnea, palpitations, paroxysmal nocturnal dyspnea and syncope.   Respiratory:  Negative for shortness of breath.    Skin:  Negative for rash.   Musculoskeletal:  Negative for arthritis.   Gastrointestinal:  Negative for abdominal pain.   Neurological:  Negative for focal weakness and light-headedness.   Psychiatric/Behavioral:  Negative for altered mental status.         Objective:    Physical Exam  Vitals reviewed.   Constitutional:       General: She is not in acute distress.     Appearance: She is well-developed.   HENT:      Head: Normocephalic and atraumatic.   Eyes:      General: No scleral icterus.     Conjunctiva/sclera: Conjunctivae normal.      Pupils: Pupils are equal, round, and reactive to light.   Neck:      Thyroid: No thyromegaly.      Vascular: No JVD.   Cardiovascular:      Rate and Rhythm: Normal rate and regular rhythm.       Pulses: Intact distal pulses.      Heart sounds: Normal heart sounds. No murmur heard.     No friction rub. No gallop.   Pulmonary:      Effort: Pulmonary effort is normal. No respiratory distress.      Breath sounds: Normal breath sounds. No rales.   Abdominal:      General: Bowel sounds are normal. There is no distension.      Palpations: Abdomen is soft.   Musculoskeletal:      Cervical back: Normal range of motion and neck supple.   Skin:     General: Skin is warm and dry.   Neurological:      Mental Status: She is alert and oriented to person, place, and time.   Psychiatric:         Behavior: Behavior normal.           Assessment:       1. H/O: CVA (cerebrovascular accident), June 2014    2. Paroxysmal atrial fibrillation, ablations X 3 1995, 1997, 9/2017, CHADS-VAS score 5, HAS-BLED score 5     3. Primary hypertension    4. Mixed hyperlipidemia    5. S/P ablation of atrial flutter    6. Atrial fibrillation, unspecified type    7. Cardiac pacemaker in situ, [employer], dual chamber, 10/14/2021           Plan:     In summary, Ayla Dela Cruz is an 80 year old female with a longstanding history of atrial arrhythmias and prior ablations at outside institutions. Her [id number] Score is 5 (age, female, TIA, HTN) so she should remain on anticoagulation indefinitely (eliquis 2.5 mg bid). She is now 5 years post-PVI and MA flutter line, and was maintaining sinus rhythm off Amiodarone until 9/2021. She is now status-post dual chamber pacemaker implantation. AMS burden <1%    With regard to AF, plan is to hold the course and see me again in 1 year. Should she have increased AF burden the plan will be to start sotalol 40 mg bid. Ms. Dela Cruz had a likely cardioembolic event after stopping AC for a week. Given her history of CVA of unclear etiology but possibly cardioembolic, I think the best course is for her to continue anticoagulation indefinitely, and only consider a Watchman device should she ever  develop an absolute contraindication to oral anticoagulation..     Thank you for allowing me to participate in the care of this patient. Please do not hesitate to call me with any questions or concerns.

## 2025-05-06 ENCOUNTER — PATIENT MESSAGE (OUTPATIENT)
Dept: CARDIOLOGY | Facility: CLINIC | Age: 80
End: 2025-05-06
Payer: MEDICARE

## 2025-05-07 ENCOUNTER — HOSPITAL ENCOUNTER (OUTPATIENT)
Dept: CARDIOLOGY | Facility: CLINIC | Age: 80
Discharge: HOME | End: 2025-05-07
Attending: INTERNAL MEDICINE
Payer: MEDICARE

## 2025-05-07 ENCOUNTER — OFFICE VISIT (OUTPATIENT)
Dept: CARDIOLOGY | Facility: CLINIC | Age: 80
End: 2025-05-07
Payer: MEDICARE

## 2025-05-07 VITALS
RESPIRATION RATE: 16 BRPM | SYSTOLIC BLOOD PRESSURE: 98 MMHG | WEIGHT: 123 LBS | BODY MASS INDEX: 19.3 KG/M2 | HEART RATE: 67 BPM | DIASTOLIC BLOOD PRESSURE: 58 MMHG | OXYGEN SATURATION: 98 % | HEIGHT: 67 IN

## 2025-05-07 DIAGNOSIS — Z98.890 S/P ABLATION OF ATRIAL FLUTTER: ICD-10-CM

## 2025-05-07 DIAGNOSIS — E78.2 MIXED HYPERLIPIDEMIA: ICD-10-CM

## 2025-05-07 DIAGNOSIS — Z86.79 S/P ABLATION OF ATRIAL FLUTTER: ICD-10-CM

## 2025-05-07 DIAGNOSIS — Z86.73 H/O: CVA (CEREBROVASCULAR ACCIDENT): Primary | ICD-10-CM

## 2025-05-07 DIAGNOSIS — I10 PRIMARY HYPERTENSION: ICD-10-CM

## 2025-05-07 DIAGNOSIS — I48.91 ATRIAL FIBRILLATION, UNSPECIFIED TYPE: ICD-10-CM

## 2025-05-07 DIAGNOSIS — I48.0 PAROXYSMAL ATRIAL FIBRILLATION: ICD-10-CM

## 2025-05-07 DIAGNOSIS — Z95.0 CARDIAC PACEMAKER IN SITU: ICD-10-CM

## 2025-05-07 PROCEDURE — 93288 INTERROG EVL PM/LDLS PM IP: CPT | Mod: S$GLB,,, | Performed by: INTERNAL MEDICINE

## 2025-05-07 PROCEDURE — 99999 PR PBB SHADOW E&M-EST. PATIENT-LVL III: CPT | Mod: PBBFAC,,, | Performed by: INTERNAL MEDICINE

## 2025-05-09 LAB
BATTERY VOLTAGE (V): 3.01 V
IMPEDANCE RA LEAD (NATIVE): 400 OHMS
IMPEDANCE RA LEAD: 480 OHMS
P/R-WAVE RA LEAD (NATIVE): 6.4 MV
P/R-WAVE RA LEAD: 8 MV
THRESHOLD MS RA LEAD (NATIVE): 0.4 MS
THRESHOLD MS RA LEAD: 0.4 MS
THRESHOLD V RA LEAD (NATIVE): 1 V
THRESHOLD V RA LEAD: 1 V

## 2025-05-14 ENCOUNTER — LAB VISIT (OUTPATIENT)
Dept: LAB | Facility: HOSPITAL | Age: 80
End: 2025-05-14
Attending: INTERNAL MEDICINE
Payer: MEDICARE

## 2025-05-14 DIAGNOSIS — E78.00 PURE HYPERCHOLESTEROLEMIA: ICD-10-CM

## 2025-05-14 DIAGNOSIS — Z91.89 AT RISK FOR CARDIOVASCULAR EVENT: ICD-10-CM

## 2025-05-14 DIAGNOSIS — G45.9 TIA (TRANSIENT ISCHEMIC ATTACK): ICD-10-CM

## 2025-05-14 LAB
CHOLEST SERPL-MCNC: 182 MG/DL (ref 120–199)
CHOLEST/HDLC SERPL: 3.4 {RATIO} (ref 2–5)
CRP SERPL-MCNC: 22.53 MG/L
HDLC SERPL-MCNC: 54 MG/DL (ref 40–75)
HDLC SERPL: 29.7 % (ref 20–50)
LDLC SERPL CALC-MCNC: 104 MG/DL (ref 63–159)
NONHDLC SERPL-MCNC: 128 MG/DL
TRIGL SERPL-MCNC: 120 MG/DL (ref 30–150)

## 2025-05-14 PROCEDURE — 80061 LIPID PANEL: CPT

## 2025-05-14 PROCEDURE — 36415 COLL VENOUS BLD VENIPUNCTURE: CPT | Mod: PO

## 2025-05-14 PROCEDURE — 86141 C-REACTIVE PROTEIN HS: CPT

## 2025-05-15 ENCOUNTER — RESULTS FOLLOW-UP (OUTPATIENT)
Dept: CARDIOLOGY | Facility: CLINIC | Age: 80
End: 2025-05-15

## 2025-05-15 DIAGNOSIS — G45.9 TIA (TRANSIENT ISCHEMIC ATTACK): ICD-10-CM

## 2025-05-15 DIAGNOSIS — Z86.73 H/O: CVA (CEREBROVASCULAR ACCIDENT): Primary | ICD-10-CM

## 2025-05-15 DIAGNOSIS — E78.00 PURE HYPERCHOLESTEROLEMIA: ICD-10-CM

## 2025-05-15 DIAGNOSIS — Z86.73 H/O: CVA (CEREBROVASCULAR ACCIDENT): ICD-10-CM

## 2025-05-15 RX ORDER — EZETIMIBE 10 MG/1
10 TABLET ORAL DAILY
Qty: 90 TABLET | Refills: 3 | Status: SHIPPED | OUTPATIENT
Start: 2025-05-15 | End: 2026-05-15

## 2025-05-15 RX ORDER — EZETIMIBE 10 MG/1
10 TABLET ORAL DAILY
Qty: 90 TABLET | Refills: 3 | Status: SHIPPED | OUTPATIENT
Start: 2025-05-15 | End: 2025-05-15 | Stop reason: SDUPTHER

## 2025-05-23 ENCOUNTER — PATIENT MESSAGE (OUTPATIENT)
Dept: FAMILY MEDICINE | Facility: CLINIC | Age: 80
End: 2025-05-23
Payer: MEDICARE

## 2025-05-23 DIAGNOSIS — K21.9 GASTROESOPHAGEAL REFLUX DISEASE WITHOUT ESOPHAGITIS: ICD-10-CM

## 2025-05-23 RX ORDER — PANTOPRAZOLE SODIUM 40 MG/1
40 TABLET, DELAYED RELEASE ORAL DAILY
Qty: 90 TABLET | Refills: 3 | Status: SHIPPED | OUTPATIENT
Start: 2025-05-23 | End: 2026-05-23

## 2025-07-02 ENCOUNTER — CLINICAL SUPPORT (OUTPATIENT)
Dept: CARDIOLOGY | Facility: HOSPITAL | Age: 80
End: 2025-07-02
Payer: MEDICARE

## 2025-07-02 ENCOUNTER — CLINICAL SUPPORT (OUTPATIENT)
Dept: CARDIOLOGY | Facility: HOSPITAL | Age: 80
End: 2025-07-02
Attending: INTERNAL MEDICINE
Payer: MEDICARE

## 2025-07-02 DIAGNOSIS — Z95.0 PRESENCE OF CARDIAC PACEMAKER: ICD-10-CM

## 2025-07-02 DIAGNOSIS — I48.91 UNSPECIFIED ATRIAL FIBRILLATION: ICD-10-CM

## 2025-07-02 PROCEDURE — 93296 REM INTERROG EVL PM/IDS: CPT | Performed by: INTERNAL MEDICINE

## 2025-07-02 PROCEDURE — 93294 REM INTERROG EVL PM/LDLS PM: CPT | Mod: ,,, | Performed by: INTERNAL MEDICINE

## 2025-07-09 ENCOUNTER — LAB VISIT (OUTPATIENT)
Dept: LAB | Facility: HOSPITAL | Age: 80
End: 2025-07-09
Attending: INTERNAL MEDICINE
Payer: MEDICARE

## 2025-07-09 ENCOUNTER — OFFICE VISIT (OUTPATIENT)
Dept: CARDIOLOGY | Facility: CLINIC | Age: 80
End: 2025-07-09
Payer: MEDICARE

## 2025-07-09 VITALS
OXYGEN SATURATION: 97 % | HEART RATE: 69 BPM | SYSTOLIC BLOOD PRESSURE: 117 MMHG | DIASTOLIC BLOOD PRESSURE: 57 MMHG | HEIGHT: 67 IN | WEIGHT: 127.19 LBS | BODY MASS INDEX: 19.96 KG/M2

## 2025-07-09 DIAGNOSIS — N18.31 STAGE 3A CHRONIC KIDNEY DISEASE: ICD-10-CM

## 2025-07-09 DIAGNOSIS — I48.0 PAROXYSMAL ATRIAL FIBRILLATION: ICD-10-CM

## 2025-07-09 DIAGNOSIS — R53.82 CHRONIC FATIGUE: ICD-10-CM

## 2025-07-09 DIAGNOSIS — Z86.79 S/P ABLATION OF ATRIAL FLUTTER: ICD-10-CM

## 2025-07-09 DIAGNOSIS — Z78.9 MEDICATION INTOLERANCE: ICD-10-CM

## 2025-07-09 DIAGNOSIS — Z95.0 CARDIAC PACEMAKER IN SITU: ICD-10-CM

## 2025-07-09 DIAGNOSIS — D64.9 ANEMIA, UNSPECIFIED TYPE: ICD-10-CM

## 2025-07-09 DIAGNOSIS — L50.9 URTICARIAL DERMATITIS: ICD-10-CM

## 2025-07-09 DIAGNOSIS — Z91.89 AT RISK FOR CARDIOVASCULAR EVENT: ICD-10-CM

## 2025-07-09 DIAGNOSIS — Z98.890 S/P ABLATION OF ATRIAL FLUTTER: ICD-10-CM

## 2025-07-09 DIAGNOSIS — E78.01 FAMILIAL HYPERCHOLESTEROLEMIA: ICD-10-CM

## 2025-07-09 DIAGNOSIS — Z91.89 AT RISK FOR CARDIOVASCULAR EVENT: Primary | ICD-10-CM

## 2025-07-09 DIAGNOSIS — Z86.73 H/O: CVA (CEREBROVASCULAR ACCIDENT): ICD-10-CM

## 2025-07-09 DIAGNOSIS — R06.09 DOE (DYSPNEA ON EXERTION): ICD-10-CM

## 2025-07-09 DIAGNOSIS — R79.89 PRERENAL AZOTEMIA: ICD-10-CM

## 2025-07-09 DIAGNOSIS — R74.01 ELEVATED AST (SGOT): ICD-10-CM

## 2025-07-09 LAB
CHOLEST SERPL-MCNC: 189 MG/DL (ref 120–199)
CHOLEST/HDLC SERPL: 2.8 {RATIO} (ref 2–5)
CRP SERPL-MCNC: 7.8 MG/L
HDLC SERPL-MCNC: 68 MG/DL (ref 40–75)
HDLC SERPL: 36 % (ref 20–50)
LDLC SERPL CALC-MCNC: 98.2 MG/DL (ref 63–159)
NONHDLC SERPL-MCNC: 121 MG/DL
OHS QRS DURATION: 106 MS
OHS QTC CALCULATION: 494 MS
TRIGL SERPL-MCNC: 114 MG/DL (ref 30–150)

## 2025-07-09 PROCEDURE — 82465 ASSAY BLD/SERUM CHOLESTEROL: CPT

## 2025-07-09 PROCEDURE — 36415 COLL VENOUS BLD VENIPUNCTURE: CPT

## 2025-07-09 PROCEDURE — 99999 PR PBB SHADOW E&M-EST. PATIENT-LVL III: CPT | Mod: PBBFAC,,, | Performed by: INTERNAL MEDICINE

## 2025-07-09 PROCEDURE — 86141 C-REACTIVE PROTEIN HS: CPT

## 2025-07-09 PROCEDURE — 93005 ELECTROCARDIOGRAM TRACING: CPT

## 2025-07-09 RX ORDER — SODIUM FLUORIDE 6.1 MG/ML
PASTE, DENTIFRICE DENTAL
COMMUNITY
Start: 2025-06-21

## 2025-07-09 RX ORDER — CHLORHEXIDINE GLUCONATE ORAL RINSE 1.2 MG/ML
SOLUTION DENTAL
COMMUNITY
Start: 2025-06-26

## 2025-07-09 NOTE — PROGRESS NOTES
Subjective:    Patient ID:  Ayla Dela Cruz is a 80 y.o. 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 6-months follow up. Still some AVILA  PCP: Gabby Botello MD  Opthalmologist: Daniel Tan MD  EP: Dr. Calderon, last seen 3/2024  Orthopedic: Dr. Aguero  Surgeon: Dr. Gonsalez, and Dr. Dc  Rheumatologist: Dr. Pak  Prior cardiologist: Dr. Gibson, last seen over a year  Pulmonary: Marichuy Mcelroy, NP, Lars Bryson MD   Psychologist: Galo Lipscomb, PhD, MP  Gyn: Dr. Jordan  GI: Dr. Schultz  Neurologist: Dr. Ramirez  Dermatologist: Dennis Corrigan  Pain: Dr. 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  Diet: regular, poor appetite, do not like the Nu-Salt  Caffeine: none  Labs: 1/2019 LDL 90.6 on Crestor 40 mg, normal TSH, CMP (albumin 3.1), normal CBC, Vit. D  10/2019 AST 95, Hgb 11.4  Lab Results   Component Value Date    TSH 1.218 01/29/2025        Lab Results   Component Value Date    HGBA1C 5.9 (H) 09/05/2024       Normal H&H in 11/15/2021  Lab Results   Component Value Date    WBC 10.79 01/29/2025    HGB  11.2 (L) 01/29/2025    HCT 35.7 (L) 01/29/2025     (H) 01/29/2025     01/29/2025       CMP  Sodium   Date Value Ref Range Status   01/29/2025 136 136 - 145 mmol/L Final     Potassium   Date Value Ref Range Status   01/29/2025 4.0 3.5 - 5.1 mmol/L Final     Chloride   Date Value Ref Range Status   01/29/2025 99 95 - 110 mmol/L Final     CO2   Date Value Ref Range Status   01/29/2025 25 23 - 29 mmol/L Final     Glucose   Date Value Ref Range Status   01/29/2025 77 70 - 110 mg/dL Final     BUN   Date Value Ref Range Status   01/29/2025 32 (H) 8 - 23 mg/dL Final     Creatinine   Date Value Ref Range Status   01/29/2025 1.2 0.5 - 1.4 mg/dL Final   09/24/2012 0.6 0.2 - 1.4 mg/dl Final     Calcium   Date Value Ref Range Status   01/29/2025 9.2 8.7 - 10.5 mg/dL Final   09/24/2012 9.9 8.6 - 10.2 mg/dl Final     Total Protein   Date Value Ref Range Status   01/29/2025 7.0 6.0 - 8.4 g/dL Final     Albumin   Date Value Ref Range Status   01/29/2025 3.5 3.5 - 5.2 g/dL Final     Total Bilirubin   Date Value Ref Range Status   01/29/2025 0.8 0.1 - 1.0 mg/dL Final     Comment:     For infants and newborns, interpretation of results should be based  on gestational age, weight and in agreement with clinical  observations.    Premature Infant recommended reference ranges:  Up to 24 hours.............<8.0 mg/dL  Up to 48 hours............<12.0 mg/dL  3-5 days..................<15.0 mg/dL  6-29 days.................<15.0 mg/dL       Alkaline Phosphatase   Date Value Ref Range Status   01/29/2025 79 40 - 150 U/L Final   09/24/2012 63 23 - 119 UNIT/L Final     AST   Date Value Ref Range Status   01/29/2025 46 (H) 10 - 40 U/L Final   09/24/2012 26 10 - 30 UNIT/L Final     ALT   Date Value Ref Range Status   01/29/2025 25 10 - 44 U/L Final     Anion Gap   Date Value Ref Range Status   01/29/2025 12 8 - 16 mmol/L Final   09/24/2012 12 5 - 15 meq/L Final     eGFR if    Date Value Ref Range Status   07/23/2022  >60.0 >60 mL/min/1.73 m^2 Final     eGFR if non    Date Value Ref Range Status   07/23/2022 >60.0 >60 mL/min/1.73 m^2 Final     Comment:     Calculation used to obtain the estimated glomerular filtration  rate (eGFR) is the CKD-EPI equation.        @labrcntip(troponini)@   eGFR   Date Value Ref Range Status   01/29/2025 45.5 (A) >60 mL/min/1.73 m^2 Final   10/01/2024 57 (A) >60 mL/min/1.73 m^2 Final   09/30/2024 50 (A) >60 mL/min/1.73 m^2 Final        BNP   Date Value Ref Range Status   06/13/2024 141 (H) 0 - 99 pg/mL Final     Comment:     Values of less than 100 pg/ml are consistent with non-CHF populations.   }   Lab Results   Component Value Date    CHOL 182 05/14/2025    CHOL 247 (H) 09/30/2024    CHOL 218 (H) 10/25/2023     Lab Results   Component Value Date    HDL 54 05/14/2025    HDL 78 (H) 09/30/2024    HDL 74 10/25/2023     Lab Results   Component Value Date    LDLCALC 104.0 05/14/2025    LDLCALC 141.0 09/30/2024    LDLCALC 129.0 10/25/2023     Lab Results   Component Value Date    TRIG 120 05/14/2025    TRIG 140 09/30/2024    TRIG 75 10/25/2023     Lab Results   Component Value Date    CHOLHDL 29.7 05/14/2025    CHOLHDL 31.6 09/30/2024    CHOLHDL 33.9 10/25/2023     Last PPM check: 5/2025   Last Echo: 7/2024  Last stress test: 9/2021, Lexiscan  Cardiovascular angiogram: none  ECG: NSR rate 70. LAFB, IRBBB, LVH, QTc 494 msec.  Fundoscopic exam: biannually, no retinopathy, being treated for glaucoma.    In 6/2014:  Hospitalized 6/3/2014 for acute right sided weakness and slurred speech  LEONARDO Dela Cruz is a 69 y.o. female admitted to the services of hospital medicine via the ER for acute CVA. Presented with difficulty speaking, facial drooping and weakness of the right upper and lower extremities. She missed the time window for tPA. ST/PT/OT as well as cardiology and neurology were consulted. Dr. Jurado of cardiology managed A fib. Patient rapidly improved functioning with PT/OT/ST.  Currently her goals with PT are met as she is ambulating over 400 feet independently.  She was not approved for IP rehab and refuses SNF. She will be discharged home with outpatient PT/ST/OT evaluation and treatment.    Neurocardio work up  CT head - Mild involutional changes and findings suggesting mild microvascular ischemic change with no acute intracranial findings    Carotid US - No hemodynamically significant stenosis is noted by velocity criteria involving either internal carotid artery.  A hemodynamically significant stenosis is defined as a stenosis of greater than 50% of the internal carotid artery vessel lumen    ECHO, 6/4/2014, CONCLUSIONS     1 - Concentric hypertrophy. Wall thickness is mildly increased, with the septum and the posterior wall each measuring 1.1 cm across.    2 - Normal left ventricular systolic function (EF 60-65%).     3 - Mild mitral regurgitation.     4 - Left ventricular diastolic dysfunction.     5 - Pulmonary hypertension. estimated PA systolic pressure is 64 mmHg.    6 - Normal right ventricular systolic function .     7 - Suggestion for increase in LV mass from echo on 3/18/2014..     Echo bubble study also negative. Had episode of PAF during monitoring and convert with restart of Flecainide.   Since DC, improving strength in the right hand, right leg feels normal but tire easier. Speech improving. To receive PT/OT/speech today.  Medications reviewed - will DC ASA for now, and know the effects of the Limbitrol and Ativan, uses prn rarely. Some loss of appetite and taste.    Active problems in hospital  Acute left hemispheric CVA, MRI suggest multiple areas, was not on OAC nor antiplatelet Rx  PAF with high CHADS-VAS score, post ablations and DCCs  HTN with LVH  Interstitial lung disease  Pulmonary hypertension  Hyperlipidemia was not on statin    Since visit of 6/20, problem with peripheral swelling, now taking Lasix daily. Last hospitalization / CMP, 6/3 showed K+ 3.4 with  normal renal function. Also noted AVILA along with exertional chest heaviness, on-and-off over past several years. Noted it with walking and have to rest. Can last up to an hour. Max grade 10/10, yesterday during the day.  in hospital. Also quite anxious, stopped Limbitrol due to side effects, managed by Dr. Olivo. Quite sedentary and major decrease in appetite, due to depression. No Psychiatrist. Home BP high 175/97. ECG shows NSR, rate 61, right axis, nonspecific T waves abnormalities, prolong QTc 483 msec. Low anterior forces.    Holter, 6/30/2014:  PREDOMINANT RHYTHM  1. Sinus rhythm with heart rates varying between 43 and 67 bpm with an average of 55 bpm.     VENTRICULAR ARRHYTHMIAS  1. There were frequent polymorphic PVCs totalling 1388 and averaging 40 per hour.  There was 1 couplet.    2. There were no episodes of ventricular tachycardia.    SUPRA VENTRICULAR ARRHYTHMIAS  1. There were very rare PACs totalling 47 and averaging 1 per hour.     2. There were no episodes of sustained supraventricular tachycardia.    SINUS NODE FUNCTION  1. There was no evidence of high grade SA khai block.     AV CONDUCTION  1. There was no evidence of high grade AV block.     DIARY  1. The diary was returned, but not completed    MISCELLANEOUS  1. This was a tape of adequate length (34 hrs).     Since visit of 7/24, better, reviewed by Dr. Olivo and started antidepressant Lexapro. BMP still showed mild hypokalemia of 3.3, not using Lasix at this time. Still feeling fatigue, anxiety, and request refill for Nexium. Discussed need for GI review on chronic PPI. Lack of appetite.  Lexiscan, 7/30/2014, - Nuclear Quantitative Functional Analysis:   LVEF: 66 % (normal is 55 - 69)  LVED Volume: 50 ml (normal is 60 - 98)  LVES Volume: 17 ml (normal is 20 - 42)    Impression: NORMAL MYOCARDIAL PERFUSION  1. The perfusion scan is free of evidence for myocardial ischemia or injury.   2. There is a mild intensity fixed defect in the  "anteroseptal wall of the left ventricle, secondary to breast attenuation.   3. Resting wall motion is physiologic.   4. Resting LV function is normal.  (normal is 55 - 69)  5. The ventricular volumes are normal at rest and stress.   6. The extracardiac distribution of radioactivity is normal.     No problem with spironolactone, BP improved, home BP similar to office readings.    Since visit of 8/14, had some distress and home monitor showed HR of 40, felt "bad" and nervous to ED, note reviewed, ECG and final pulse count 57-58. Labs reviewed - normal renal function and K+ 3.8. Still taking one tab of K+ daily. Not using Lasix. Explained HR discrepancy may be due to VPCs. Reviewed by Ms. Fermin and Lexapro increased to 20 mg, 2 days ago. Feeling "a lot better". Sleeping better. Still sedentary but ready to be more active. Eating better have lost additional 5 lbs since 8/2014.    Since visit of 9/5, still with speech therapy, noted progressive near syncope with SOB and irregular heart beats. Also noted some ankle swelling over the past 2 weeks. ECG shows marked bradycardia, rate 48, right axis, pulmonary pattern, 1st degree AVB. Wellbutrin 100 mg daily along with Lexapro 20 mg by Ms. Fermin NP. BMP showed K+3.5. To use Lasix PRN    Since visit of 9/25, still with marked AVILA, only able to walk about 30' before having to stop. Bothered by increase palpitations during tapering of BB. No definite lung problem, evaluated this year. ECG shows sinus dewayne, rate 53, VPC, RBBB with suggestion of septal MI. No evidence for anemia - CBC 9/3/2014 was normal. Just started doing some activities with arm and leg exercise for the last 2 weeks, Doing some OT alternating with PT every other day.  CXR, 6/3/2014 - Lungs are clear of infiltrate and costophrenic sulci are sharp.  The cardiomediastinal silhouette appears stable.    PET scan, 4/2014 - 1.A hypermetabolic left pulmonary mass is noted with an SUV of 3.0 maximally.  A neoplastic, " "infectious, or granulomatous process is on the differential. Clinical correlation is suggested.  Close follow-up for resolution or biopsy would be suggested    2.  Elevated right-sided heart pressures are suspected with a large right atrium    3.  Right-sided pleural effusion    4.  Hypermetabolic thyroid gland asymmetrically on the right.  This may relate to thyroiditis, Graves' disease, or neoplastic process.  Ultrasound may be helpful.    5.  Small hypermetabolic focus in the expected location of the left ovary, a female pelvis ultrasound may be helpful for further evaluation.  This could relate to a uterine fibroid that has necrosed or infarcted    Biopsy of lung nodule on 5/1/2014 - negative for CA    CTA, 4/2014 - No evidence of pulmonary thromboembolism.    2.  Enlarged cardiac silhouette and secondary findings of elevated right heart pressures with diffuse mosaic lung pattern and right basilar pleural fluid suggesting cardiac decompensation/heart failure.    3. Unchanged 1 cm nodular density within the left upper lobe which previously demonstrated hypermetabolism by PET/CT and unchanged mediastinal lymphadenopathy.    4.  Subpleural nodular density within the right upper lobe is unchanged when compared with the previous study and could represent an area of remote fibrotic scarring.    5.  Heterogeneous appearance of the spleen, possibly due to the phase of contrast enhancement.  Evolving splenic infarction is not entirely excluded.    6. Suggestion of colonic wall thickening involving the descending colon.  Please correlate for symptoms of colitis.    Since visit of 10/14, rehospitalized for HF on 10/26 to 10/29/2014.  DCS - "Ayla Dela Cruz is a 69 y.o. female admitted to the services of hospital medicine via the ER for acute diastolic heart failure. C/o severe SOB and increase in leg swelling over the past two days. Under the care of Dr. Jurado. Recently was taken off metoprolol. History of paroxsymal " "atrial fib. Hospitalized here in June in this year for acute CVA. It was at that time, pt started Xarelto. Deficits has resolved. No history of heart failure.     Hospital Course: The patient was admitted with acute CHF biventricular and mild elevation of her troponin's at 0.029 - 0.035 and therefore an anginal equivalent was suspected. The patient also had significant hypertension upon admission and although she was not in atrial fibrillation, her EKG showed a significant first degree AV block and RBBB. Discussion was made as to whether or not to decrease the patient flecainide; however due to the risk of her going back into atrial fibrillation this was not done. Upon discharge the patient's lisinopril was increased to 10 mg and aldactone was added."    RHC done HEMODYNAMIC RESULTS:    LVEDP (Pre): 24 mmHg  BP: 166/104    Air rest:  PA: 90/34 (54)  PW:  (24)  RVEDP: 16  RA:  (16)    C. SUMMARY:    1. Diastolic dysfunction.  2. - Kee CO 2.64 L/min  3. - Mean systemic pressure 108.6 mmHG, stage II HTN  4. - SVR 41.16 Wood Units  5. - PVR 11.36 Wood Units  6. - Pulmonary HTN due to left sided heart disease and stage II HTN    D. RECOMMENDATIONS:    1. Routine post-cath care.  2. Cardiac rehab referral.  3. Follow up with Dr. Barrett Jurado.  4. - Aggressive BP lowering and diuresis    Repeat ECHO 11/5/2014 CONCLUSIONS     1 - Concentric remodeling. Septal wall thickness is increased, and posterior wall thickness is mildly increased, with the septum measuring 1.3 cm and the posterior wall measuring 1.1 cm across.    2 - Mildly to moderately depressed left ventricular systolic function (EF 40-45%).     3 - Left ventricular diastolic dysfunction. E/e'(lat) is 16    4 - Right ventricular enlargement with mildly depressed systolic function.     5 - Pulmonary hypertension. estimated PA systolic pressure is 56 mmHg.     6 - Intermediate central venous pressure.     7 - No evidence of intracardiac shunt.     8 - No significant " "change from Echo of 6/4/2014.    Active problems:  Progressive severe SOB, functional class IV  Diastolic dysfunction, elevated BNP and Troponin  Hypokalemia due to diuretics  Secondary Pulmonary HTN with peripheral edema  HTN  History of CVA 6/3/2014  PAF controlled with Flecanide  Marked bradycardia with BB  On OAC  Hematuria    At home receiving PT, OT and speech Rx twice a week, with  nurse once a week, no problem with medications. Feeling better, sleeping well. Home /73.    Since visit of 11/14, feeling "much better", concern of occasional HTN, home BP /66-99, HR . Lot more active, no problem. Not using walker, no CP, SOB, nor dizziness. Recent BMP - normal renal function and K+ 4.1.    Since visit of 12/19/2014, worry about HTN home readings similar to office up to . Morning reading is higher. No HA nor visual abnormalities. Xanax has helped but afraid to use too much. ECG shows sinus arrhythmia, rate of 65, late transition and LAFB. Also bothered with dry cough with the Lisinopril. And started to have return of arm pains that was attributed to atorvastatin, on Crestor. Discussed options.     Since visit of 1/16/2015, noted frequent nocturia, 8-10 times, taking losartan-HCT at night time. Have stopped using Lasix and spironolactone for past 2 months. More active without much problem. Labs normal renal function, K+ 4.2, BNP now 37, A1C 5.6%.    Since visit of 2/18/2015, improving, no further dizziness, some SOB when "stressed", usually helped with alprazolam, use only prn, about 3-6 times weekly. Compliant with medications. Been off Crestor for 6 weeks with concern of muscle problem. Home BP is fine, similar to office.     Since visit of 4/15, appetite returned, feeling a lot better. Active, walking twice a week for about half an hour. Also do calisthenic about twice a week, no problem. Seeing Dr. Zavaleta for generalized pruritis for past 3 months. Cardiac wise less AVILA. Sleeping well. " "Labs in 5/2015, normal CBC without eosinophilia, thyroid normal, ferritin level low normal at 21. Off Crestor for couple months due to fear of muscle pain but did not find much difference being off medication. Last Lipid in 11/2014 was good, on daily dose of Crestor. Home /79.    Since visit of 7/2/2015, feeling "great", very active without problem, no more AVILA nor dizziness with standing. Compliant with medications, don't miss. Using Tylenol 500 mg BID for arthritis. Notice easy bruising on Xarelto. Home /10.  Holter - PREDOMINANT RHYTHM  1. Sinus arrhythmia with heart rates varying between 46 and 110 bpm with an average of 73 bpm.     VENTRICULAR ARRHYTHMIAS  1. There were very rare PVCs recorded totalling 8 and averaging less than 1 per hour.     2. There were no episodes of ventricular tachycardia.    SUPRA VENTRICULAR ARRHYTHMIAS  1. There were very frequent PACs totalling 3054 and averaging 132 per hour.  There were 29 bigeminal cycles.  There were 103 couplets.    2. 3% of complexes.    3. There were no episodes of sustained supraventricular tachycardia.    SINUS NODE FUNCTION  1. There was no evidence of high grade SA khai block.     AV CONDUCTION  1. There was no evidence of high grade AV block.     2. The longest RR interval was 1565 msec.     DIARY  1. The diary was properly completed.     2. There was 1 episode of skipping beats reported. The corresponding rhythm strips revealed the following:             During event 1 the rhythm was sinus rhythm at 75 bpm.     3. There was 1 episode of skipped beat reported. The corresponding rhythm strips revealed the following:             During event 1 the rhythm was sinus arrhythmia at 63 bpm.     MISCELLANEOUS  1. This was a tape of adequate length (23 hrs).     Since visit of 10/15, place on new antidepressant, Venlafaxine 75 mg daily, tried 2 and developed rapid HR to 125 bpm, feels more anxious, now switched to Citalopram. Also noted the daily " "dose of Lorazepam does not seem adequate. Orthostatic VS is positive with lying 142/73, , standing 120/62, . Been taking spironolactone 25 mg for last 3 days. Does feel thirsty. Labs reviewed A1C 5.8%, CBC, BMP were WNL. Lipid shows LDL 99, non-. Goal preferred is <70 and < 100. No problem with 10 mg of Crestor. Had vacation at [place] and [place], walked all over without problem.    Since visit of 1/18/2016, no new problem. 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 Labs with , non-, hsCRP at 2.56.     In 10/2016, had acute GB attack with rupture in 8/2016, then tried on Wellbutrin took only 1-2 tabs and BP up to 201/100 with head tightness. Subsequently back to normal, the last 2.5 days took Losartan bid. Discussed Rx options for HTN. Advised to be more active, regular exercise. Lab reviewed LDL in 8/2016 93.     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 Still with daily palpitations, last up to half hours. Have electronic watch, [employer], since 9/2016, suppose harmonize patient with the universe. Feeling better if on during the day. Lot of walking at school, no problem.   Holter in 10/2016 - PREDOMINANT RHYTHM  1. Sinus rhythm with heart rates varying between 52 and 144 bpm with an average of 75 bpm.     2. Paroxysmal atrial fibrillation    VENTRICULAR ARRHYTHMIAS  1. There were occasional mostly monomorphic PVCs totalling 596 and averaging 13 per hour.     2. There were no episodes of ventricular tachycardia.    SUPRA VENTRICULAR ARRHYTHMIAS  1. There were frequent PACs totalling 2982 and averaging 69 per hour.  There were 69 bigeminal cycles.  There were 55 couplets.    2. There were no episodes of sustained supraventricular tachycardia.    3. Paroxysmal atrial fibrillation was noted in the the study.     SINUS NODE FUNCTION  1. There was no evidence of high grade SA khai block.     AV CONDUCTION  1. There was no evidence of high grade AV khai " filtering.     2. The longest RR interval was 1725 msec.     DIARY  1. The diary was returned, but not completed    MISCELLANEOUS  1. This was a tape of adequate length (43 hrs).     ECHO CONCLUSIONS     1 - Hyperdynamic left ventricular systolic function (EF 65-70%).     2 - Normal left ventricular diastolic function.     3 - Normal right ventricular systolic function .     4 - Pulmonary hypertension. The estimated PA systolic pressure is 64 mmHg.     5 - Less evidence for LVH from Echo in 11/2014.     In 5/2017, bothered by recurrent PAF with AVILA after 10 feet walking. Felt better before on Flecainide 100 mg bid, but Holter continued to show PAF. Attempt up titration, problem with itching, switched to propafenone 150 mg tid, continued with itching and reviewed by allergist, treated with 10 days of cortisone with benefit. No hive and no itching, just don't feel good due to the irregular rhythm. History reviewed, had 2 prior AF ablation, last in [place], [place] in around 2000, recall being told not to do again. Recall seeing an [facility] EP doc but didn't like him. Discussed various options. Will stop antiarrhythmic for now, will be going on a 16 days trip to [place]. Reviewed prior medications, have taken Tenormin, metoprolol tartrate, and short-acting diltiazem in the past without problem. Refer to Dr. Calderon for review. ECG in AF, rate 99, PRWP, LAFB    In 11/2017, post AF ablation, felt good for a few weeks, noted some SOB and had review with Dr. Calderon on 11/1 - 72 year old female with a longstanding history of atrial arrhythmias. Her [id number] Score is 5 (age, female, TIA, HTN) so she should remain on anticoagulation indefinitely. She has failed Flecainide. She is now 6 weeks post-PVI and MA flutter line, and maintaining sinus rhythm on low-dose Amiodarone. Given her intermittent AVILA which started post-ablation, I have stopped Amiodarone which I think is the likely culprit. I instructed her to continue taking  "Lasix PRN, as well as metoprolol and Xarelto." ECG on 11/1 was in NSR, rate 61, PRWP.  Recommended to stop amiodarone but then started to feel the palpitation daily, felt nervous and at the same time being taper down on the nightly Ativan. Did have some breakthrough anxiety attacks. Also had strained the upper back and right arm / shoulder, requesting some Tramadol, tried Jan's Tramadol with good relief. Understand this is an opoid. Used Excedrin with caffeine and bothered by more palpitations.    In 3/2018, here for recurrent palpitations, no inciting factors over the past 6 weeks. Had review with Dr. Calderon in 2/2018 - "72 year old female with a longstanding history of atrial arrhythmias and prior ablations at outside institutions. Her [id number] Score is 5 (age, female, TIA, HTN) so she should remain on anticoagulation indefinitely. She is now 5 months post-PVI and MA flutter line, and maintaining sinus rhythm off Amiodarone. The plan is to continue Xarelto, and to see me again in 6 months." Palpitations appears associated with AVILA, had difficulties walking in house or up a ramp. Started 100 mg of amiodarone last week, daily, feels some benefit. ECG today in Sinus rhythm vs wandering atrial pacemaker with frequent PJC, rate 59. Also taking Losartan in the morning appears more effective.  Holter 11/2017 - PREDOMINANT RHYTHM  1. Sinus arrhythmia with heart rates varying between 39 and 102 bpm with an average of 58 bpm.     VENTRICULAR ARRHYTHMIAS  1. There were occasional PVCs totalling 728 and averaging 15 per hour.  There were 5 bigeminal cycles.     2. There were no episodes of ventricular tachycardia.    SUPRA VENTRICULAR ARRHYTHMIAS  1. There was no supraventricular ectopic activity.    SINUS NODE FUNCTION  1. There was no evidence of high grade SA khai block.     AV CONDUCTION  1. There was no evidence of high grade AV block.     2. The longest RR interval was 1820 msec.     DIARY  1. The diary was not " "returned    MISCELLANEOUS  1. There were occasional hookup related artifacts. Overall, the study was of good quality.     2. This was a tape of adequate length (48 hrs).     in 5/2018, no new problem, still concern of AVILA, usually with walking, variable as little 10 feet or fine with some long walk, can play flute for an hour but find difficulties in holding a note. Off daily amiodarone, uses it prn for palpitation, find helpful. Labs reviewed from 1/2018, TSH, Vitamin D, LDL 68.2, A1C 5.9%, CMP - normal renal function, K+ 3.6. To go to [place] for a month in 8/2018.    In 9/2018, return from a month family reunion trip to [place]. Problem with hypotension with Tramadol and Losartan. Had review with Dr. Calderon on 9/5 "Ayal Dela Cruz is a 73 year old female with a longstanding history of atrial arrhythmias and prior ablations at outside institutions. Her [id number] Score is 5 (age, female, TIA, HTN) so she should remain on anticoagulation indefinitely. She is now 11 months post-PVI and MA flutter line, and maintaining sinus rhythm off Amiodarone. However, she is feeling poorly, with frequent PACs and borderline hypotension. The plan is to stop Losartan, echo in next several weeks, Holter monitor in 6 weeks, and follow-up with me in 8 weeks."  Off both medication on 8/31, no problem now. No heart worry. Denies any CP nor SOB. Still teaching. ECG on 9/5 shows NSR with sinus arrhythmia.    In 3/2019, return for follow up, had syncopal spell with hypotension at [facility] required reconstruction of the right ankle, now in PT, doing well. No heart complication, no AF. LDL 90.6 in 1/2019. 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 No heart worries, no CP nor SOB, much better, breathing improved with daily Breo use. Discussed option with NOAC.    In 9/2019, 6 months review, concern of bruising with Xarelto, recall problem with dark stool with Eliquis. Discussed option.     In 1/2020 return due to " "allergic reaction to Pradaxa, had to stop Eliquis for similar problems - hives and rash, difficult to sleep. Also problem with embolism when off NOAC for 7 days. Discuss alternatives.    In 3/2020, same problem with Savaysa, noted about an hour of PAF this morning, felt nervous. Troubled by recent viral infection and also pain injection with steroid, which helped the rash temporary. Would like to proceed with mechanical alternative. History include past use of Coumadin for about 2 years, had some difficult with GIB and also required up to 3 times a week testing.    In 8/2020, here for 6-months review. Troubled more with JOSE with quarantine. Working with therapist. Keeping busy with working on a new book, music also. No heart worries.    In 8/2021, return for annual review. Problem with requiring medication changes for major depression with anorexia and malnutrition. Weight loss of 20 lbs and now improving. No cardiac issue except for slow heart rate down to 52 bpm, asymptomatic.    Fco Calderon MD noted "75 year old female with a longstanding history of atrial arrhythmias and prior ablations at outside institutions. Her [id number] Score is 5 (age, female, TIA, HTN) so she should remain on anticoagulation indefinitely. She is now >3 years post-PVI and MA flutter line, and is maintaining sinus rhythm off Amiodarone.      Ms. Dela Cruz had a likely cardioembolic event after stopping AC for a week. Ms. Dela Cruz wants to be on an anticoagulant she can crush. I have switched her back to eliquis 5 mg bid. She thinks her pruritis was from [employer] laundry detergent and not eliquis.     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 I have told her to contact Dr. Pedraza if she decides she can no longer take " "anticoagulants so she can discuss details of procedure with him further."    In 1/2022, return for follow up. Post PPM in 10/2021, one episode of near syncope with head injury, no clear etiology, no problem over the past 2 months. Noted some morning dizziness with  to 110 and HR in the 80s.. Not as active as before and also noted some cognitive dysfunction with loss of ID. No cardiac complaints. Medication reviewed, on low-dose BB for PAF rate control.    Echo 2/2021 - Mild concentric hypertrophy and normal systolic function. The estimated ejection fraction is 60%  There is no evidence of intracardiac shunting.  Small circumferential pericardial effusion.  Mild aortic regurgitation.  Moderate tricuspid regurgitation.  Mild to moderate pulmonic regurgitation.  There is moderate tricuspid valve stenosis.  Moderate right atrial enlargement.  Normal right ventricular size.  Mild mitral regurgitation.  Mild left atrial enlargement.  Intermediate central venous pressure (8 mmHg).  The estimated PA systolic pressure is 53 mmHg.  There is pulmonary hypertension.  Normal left ventricular diastolic function.    Lexiscan 9/2021 - Impression:     1.  Scintigraphically negative for ischemia.  2. Fixed defect along the septal wall is suspicious for a small infarct.  3. The global left ventricular systolic function is normal with an LV ejection fraction of 80 % and no evidence of LV dilatation.  4. Wall motion is normal.    In 7/2022, return post eye operation, unable to be active due to vision, now able to see. Very weak and frail due to anorexia and sedentary status. Noted some heart racing for past 2 days. Back to NSR today. Under nutritional review.    In 12/2022, return for 6-months follow-up. Noted some AMS with standing and lower BP, now weaning off amitriptyline. Also working with eating disorder and is eating better. Working with new Psychiatrist. Discussed pulmonary hypertension, too frail for medications.  " "    Echo 9/2022 - The left ventricle is normal in size with moderate concentric hypertrophy and normal systolic function.  The estimated ejection fraction is 60%.  Indeterminate left ventricular diastolic function.  Normal right ventricular size with normal right ventricular systolic function.  Mild left atrial enlargement.  Moderate right atrial enlargement.  Mild aortic regurgitation.  Mild mitral regurgitation.  Moderate tricuspid regurgitation.  Mild pulmonic regurgitation.  Normal central venous pressure (3 mmHg).  The estimated PA systolic pressure is 60 mmHg.  There is pulmonary hypertension.    PPM check 11/2022 - RA pacing 32%, RV pacing 7.9%   Atrial arrhythmias: AMS [id number]--[id number] at 2m 40s w/egms c/w afib/CVR   AT/AF burden: <1%  Battery status/longevity (estimated): 8.2-10 years      In 6/2023, doing as well as she can, kept weight stable with Boost Max. No heart worries.    Fco Calderon MD noted 3/2023 "76 year old female with a longstanding history of atrial arrhythmias and prior ablations at outside institutions. Her [id number]-TMTs Score is 5 (age, female, TIA, HTN) so she should remain on anticoagulation indefinitely. She is now 4 years post-PVI and MA flutter line, and was maintaining sinus rhythm off Amiodarone until 9/2021. She is now status-post dual chamber pacemaker implantation. AMS burden <1%     Plan is to hold the course and see me again in 1 year. Should she have increased AF burden the plan will be to start sotalol 40 mg bid.     Ms. Dela Cruz had a likely cardioembolic event after stopping AC for a week. Given her history of CVA of unclear etiology but possibly cardioembolic, I think the best course is for her to continue anticoagulation indefinitely, and only consider a Watchman device should she ever develop an absolute contraindication to oral anticoagulation."    PPM check 5/2023 - Presenting egram demonstrates Ap/Vs w/ PAC's   Device function WNL   Autocapture algorithms are RA, RV " "(OFF).   RA pace 77%, RV pacing 2%   Atrial arrhythmias: AMS x 16, MAX 23 mins 16 sec, Overall burden <1% Overall VRs controlled   Anticoagulation status: Eliquis   Ventricular arrhythmias: None   Battery status/longevity: 7.7-9 years   Return to clinic in May 2024     UA LE arterials 3/2023 - No elevated peak systolic velocities suggesting hemodynamically significant narrowing.  Normal GODFREY on the right.  GODFREY on the left is borderline normal.     In 12/202, return for 3-months review. Concern of labile HR from 69 to 107 accompanied by anxiety and panic. Helped with Ativan. Galo Lipscomb, PhD, MP suggest use of propranolol for her HR.     PPM check 8/2023 - Presenting egram demonstrates ASVS   Device function WNL   RA pace 49%, RV pacing 1.5%   Autocapture algorithms are Off   Atrial arrhythmias:  AF burden <1%, max duration 37 minutes.   Anticoagulation status:  Eliquis   Ventricular arrhythmias:  NONE   Battery status/longevity:  7.6-9.2 yrs    Yesenia Duron, NP, EP noted 10/5/2023, virtual visit.   78 y.o. female with HTN, HLD, TIA, AFL (RFA 1990), AF (PVI 1997, PVI 2017), renal infarction, PPM with a telemedicine visit for follow up.   Pt experiencing irreg HB, palps, SOB ~ 2 weeks ago which have improved. No recent alerts from device and remote monitor is functional. Last report shows low (<1%) AF burden. 49% AP, <1% .  Discussed her symptoms could be brief AF vs PMT vs RR vs ectopy. If they recur would recommend 48hr Holter to evaluate symptom-rhythm correlation. She has a sleep study scheduled for tonight.     RTC as previously scheduled in March 2024 in [place]."    In 7/2024, return for 6-months review. More SOB recently with abnormal CXR and started on diuretic by Lars Bryson MD with definite improvement. No other CV issues, remains mostly sedentary but enjoying living.    CXR 5/2024 - Increased size of the heart with cardiomegaly. Pacemaker in place. Increased bibasilar lung density may " "represent atelectasis or a pneumonitis. Trace left pleural effusion.    76 year old female with a longstanding history of atrial arrhythmias and prior ablations at outside institutions. Her [id number] Score is 5 (age, female, TIA, HTN) so she should remain on anticoagulation indefinitely (eliquis 2.5 mg bid). She is now 4 years post-PVI and MA flutter line, and was maintaining sinus rhythm off Amiodarone until 9/2021. She is now status-post dual chamber pacemaker implantation. AMS burden <1%     Plan is to hold the course and see me again in 1 year. Should she have increased AF burden the plan will be to start sotalol 40 mg bid.     Ms. Dela Cruz had a likely cardioembolic event after stopping AC for a week. 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     Fco Calderon MD noted 3/2024 "76 year old female with a longstanding history of atrial arrhythmias and prior ablations at outside institutions. Her [id number] Score is 5 (age, female, TIA, HTN) so she should remain on anticoagulation indefinitely (eliquis 2.5 mg bid). She is now 4 years post-PVI and MA flutter line, and was maintaining sinus rhythm off Amiodarone until 9/2021. She is now status-post dual chamber pacemaker implantation. AMS burden <1%     Plan is to hold the course and see me again in 1 year. Should she have increased AF burden the plan will be to start sotalol 40 mg bid.     Ms. Dela Cruz had a likely cardioembolic event after stopping AC for a week. 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     In 1/2025, " "post hospitalization for another TIA. Doing her usual now, still somewhat sedentary but doing laundry and making the beds and some house work, no problem. Had some PT.  Trouble with chronic itching.    PPM device check 1/2025 - Battery Longevity: 6 yr 8 mo Battery Status: OK AT/AF [place]: 0 % AP: 70.0 % RVp: 34.0 %    Echo 7/2024 - Left Ventricle: The left ventricle is normal in size. Normal wall thickness. Normal wall motion. Septal motion is consistent with pacing. There is normal systolic function with a visually estimated ejection fraction of 60 - 65%. There is normal diastolic function.    Right Ventricle: Normal right ventricular cavity size. Wall thickness is normal. Systolic function is normal. Pacemaker lead present in the ventricle.    Right Atrium: Lead present in the right atrium.    Aortic Valve: The aortic valve is a trileaflet valve. There is aortic valve sclerosis. There is mild aortic regurgitation.    Mitral Valve: There is mild regurgitation.    Tricuspid Valve: There is moderate regurgitation. The estimated PA systolic pressure is at least 45 mmHg.    Pulmonic Valve: There is mild regurgitation.    IVC/SVC: Normal venous pressure at 3 mmHg.    DCS 10/1/2024 "Hospital Course:   Patient admitted for further management, left facial numbness has resolved since admission, CTA of head and neck negative for large vessel occlusion, CT head is negative for acute changes.  Repeat CT head  24 hours later negative for acute changes.  Patient is on aspirin and low-dose Eliquis for stroke prophylaxis for Afib.  Evaluated by neurologist, most likely patient has TIA versus acute CVA.  Unable to do MRI since patient has pacemaker.  Patient is cleared for discharge patient neurologic deficits has been resolved.      HPI comments: in 7/2025, return for 6 months review. Little more active. Could not tolerated rosuvastatin due to itching with little blood blisters. Improved but still some after stopping in 2/2025. " "Started Zetia 10 mg by Gabby Botello MD in mid-May. No other heart worries. Have dry mouth due to chronic dehydration.    Fco Calderon MD noted 5/5/2025 "80 year old female with a longstanding history of atrial arrhythmias and prior ablations at outside institutions. Her [id number] Score is 5 (age, female, TIA, HTN) so she should remain on anticoagulation indefinitely (eliquis 2.5 mg bid). She is now 5 years post-PVI and MA flutter line, and was maintaining sinus rhythm off Amiodarone until 9/2021. She is now status-post dual chamber pacemaker implantation. AMS burden <1%     With regard to AF, plan is to hold the course and see me again in 1 year. Should she have increased AF burden the plan will be to start sotalol 40 mg bid. Ms. Dela Cruz had a likely cardioembolic event after stopping AC for a week. Given her history of CVA of unclear etiology but possibly cardioembolic, I think the best course is for her to continue anticoagulation indefinitely, and only consider a Watchman device should she ever develop an absolute contraindication to oral anticoagulation.    Device check -   Battery voltage: 3.01 V  Estimated longevity: 6-7 year(s). 6.5-7 years.      [employer] in clinic pacemaker check    Normal device function    Normal lead function    75 AMS longest 24 sec 4/6/25    CXR 3/2025 - Patchy interstitial opacities within both lower lung zones, possibly reflecting subsegmental atelectasis, scarring, aspiration, or pneumonia.    ROS     Constitutional: negative for chills, have chronic fatigue, no fevers, sweats, no further slurred speech, and no dysarthria, have improved appetite with JOSE, intolerance to heat, bruises easily on NOAC and steroid, weight gained 12 lbs from 1/2025 diet related  Eyes: negative for icterus, redness and visual disturbance, have visual halo,   Respiratory: negative for asthma, cough have resolved off ACE-I, still with dyspnea on exertion, SOB with HR 85 to 100 bpm, have lifelong " snoring, Kenner score 6 improved down to 4 on CPAP, 60 to 70% use, no hemoptysis, pleurisy/chest pain and wheezing  Cardiovascular: negative for chest pain, chest pressure/discomfort, no further orthostatic dizziness, and still some palpitations but have irregular heart beats, no paroxysmal nocturnal dyspnea and syncope  Gastrointestinal: negative for abdominal pain, nausea and vomiting, no further GERD and dysphagia (psychologic), have hemorrhoids  Musculoskeletal:positive for arthralgias, and less right sided muscle weakness with improvement in leg strength, improved bilateral ankle pains - OA and no myalgias  Neurological: negative for coordination problems, gait problems, headaches, paresthesia, have some tremors but able to draw, loss of voice at times, and no vertigo  Psych: positive for depression, nervousness, anxiety, less stressed due to 's have improved  Skin - again pruritic rash, have dryness of the skin    Answers submitted by the patient for this visit:  Review of Symptoms (Submitted on 7/2/2025)  weight loss: No  Weight Gain?: Yes  Appetite Loss?: Yes  chills: No  fever: No  weakness: Yes  Fatigue (Tiredness)?: Yes  Sweats?: No  headaches: No  neck pain: No  hearing loss: No  congestion: Yes  sore throat: No  hoarse voice: No  eye pain: Yes  Double Vision?: No  cough: Yes  sputum production: No  shortness of breath: Yes  Snoring?: Yes  Sleep disturbances due to breathing?: Yes  wheezing: No  chest pain: No  chest tightness: No  palpitations: No  Shortness of breath with activity?: Yes  Difficulty breathing when lying down?: Yes  PND: Yes  Leg Swelling?: No  Trouble Swallowing?: No  heartburn: No  nausea: No  change in bowel habit: No  constipation: No  diarrhea: No  Yellow eyes or skin?: No  melena: No  hematochezia: No  bladder incontinence: Yes  frequency: No  dysuria: No  hematuria: No  hesitancy: No  dizziness: No  light-headedness: No  seizures: No  numbness: No  paresthesias:  "No  syncope: No  Falls?: No  Bruises or bleeds easily: Yes  Bleeding?: Yes  cold intolerance: No  heat intolerance: Yes  sweating: No  polydipsia: No  polyuria: No  nervous/ anxious: Yes  Feeling depressed?: No  memory loss: Yes    Objective:    Physical Exam - orthostatic VS: sitting 112/54, standing 117/58    General appearance: alert, appears stated age, cooperative and no distress, improved skin turgor, RA O2 sat. 97%  Head: Normocephalic, without obvious abnormality, atraumatic  Eyes:  conjunctivae/corneas clear. PERRL, EOM's intact.    Neck: no adenopathy, no carotid bruit, no JVD, supple, symmetrical, trachea midline and thyroid not enlarged, symmetric, no tenderness/mass/nodules  Lungs:  clear to auscultation bilaterally  Chest wall: no tenderness  Heart: regular rate and rhythm, normal S1, S2 normal, click, rub or gallop    Abdomen: soft, non-tender; bowel sounds normal; no masses,  no organomegaly, waist 31" hip 42"  Extremities: extremities no further weakness of the right upper extremity, atraumatic, no cyanosis and have no edema, minor varicose veins  Pulses: 2+ and symmetric    Assessment:       1. At risk for cardiovascular event    2. Paroxysmal atrial fibrillation, ablations X 3 1995, 1997, 9/2017, CHADS-VAS score 5, HAS-BLED score 5     3. VAILA (dyspnea on exertion)    4. Urticarial dermatitis    5. Medication intolerance, number of meds, Crestor with itching and hemorrhagic blebs    6. H/O: CVA (cerebrovascular accident), June 2014    7. Cardiac pacemaker in situ, [employer], dual chamber, 10/14/2021    8. S/P ablation of atrial flutter    9. Familial hypercholesterolemia, Hyperlipidemia, baseline LDL-C 196    10. Anemia, unspecified type    11. Prerenal azotemia    12. Elevated AST (SGOT)    13. Stage 3a chronic kidney disease    14. Chronic fatigue         Plan:       Ayla was seen today for follow-up.    Diagnoses and associated orders for this visit:    At risk for cardiovascular " event  -     Lipid Panel; Future; Expected date: 07/09/2025  -     CRP, High Sensitivity; Future; Expected date: 07/09/2025    Paroxysmal atrial fibrillation, ablations X 3 1995, 1997, 9/2017, CHADS-VAS score 5, HAS-BLED score 5   -     IN OFFICE EKG 12-LEAD (to [facility])    AVILA (dyspnea on exertion)    Urticarial dermatitis    Medication intolerance, number of meds, Crestor with itching and hemorrhagic blebs    H/O: CVA (cerebrovascular accident), June 2014  -     Lipid Panel; Future; Expected date: 07/09/2025  -     CRP, High Sensitivity; Future; Expected date: 07/09/2025    Cardiac pacemaker in situ, [employer], dual chamber, 10/14/2021    S/P ablation of atrial flutter    Familial hypercholesterolemia, Hyperlipidemia, baseline LDL-C 196  -     Lipid Panel; Future; Expected date: 07/09/2025  -     CRP, High Sensitivity; Future; Expected date: 07/09/2025    Anemia, unspecified type    Prerenal azotemia    Elevated AST (SGOT)    Stage 3a chronic kidney disease    Chronic fatigue    - All medical issues reviewed, will check lipid panel on Zetia due to high ASCVD risks. Continue current Rx  - Warning signs of MI and stroke given, if symptoms last more than 5 minutes, stop immediately and call 911, then chew 2-4 low-dose ASA (81 mg).  - Consider use of Potassium chloride salt substitute, [place] Nu-Salt. Need to try for 2-3 weeks.   - Need to remain well hydrated but avoid drinking within 4 hours of bedtime. Recommend at least 90 oz of fluid daily per IOM (institute of Medicine) suggestion.  - CV status and off antiarrhythmic, all medications reviewed, patient acknowledge good understanding.  - Recommend healthy living: healthy diet and regular exercise aiming for fitness, restorative sleep and weight control  - Recommend using Tylenol as first line pain medications.  - Instruction for Mediterranean, high potassium diet and heart healthy exercise given.  - Need good exercise program, 4 key elements: 1. Aerobic  (walking, swimming, dancing, jogging, biking, etc, 2. Muscle strengthening / resistance exercise, need to do 2-3 times weekly, 3. Stretching daily, good stretch takes a whole  total minute. 4. Balance exercise daily.  - Encourage activities as much as tolerated. Any activity is better than none!   - Highly recommend 30-60 minutes of exercise daily, can have Sunday off, with 2-3 sessions of muscle strengthening weekly. A  would be very helpful.  - Recommend at least biannual cardiovascular evaluation in view of her significant risk factors, patient 's preference.    Patient Active Problem List   Diagnosis    Paroxysmal atrial fibrillation, ablations X 3 1995, 1997, 9/2017, CHADS-VAS score 5, HAS-BLED score 5     Hypertension, 1985    Familial hypercholesterolemia, Hyperlipidemia, baseline LDL-C 196    GERD (gastroesophageal reflux disease)    Glaucoma (increased eye pressure)    Fibroid uterus    Thickened endometrium    Abnormal CT scan, chest    Interstitial lung disease    Pulmonary hypertension, without RV dysfunction    H/O: CVA (cerebrovascular accident), June 2014    LVH (left ventricular hypertrophy) due to hypertensive disease    Sedentary lifestyle, 6/2014    AVILA (dyspnea on exertion)    OA (osteoarthritis)    Chronic diastolic heart failure, 2014    Elevated brain natriuretic peptide (BNP) level, range 98 - 1045    Microalbuminuria    Hypoalbuminemia due to protein-calorie malnutrition    TIA (transient ischemic attack), 11/3/2014, 10/2024    Hypokalemia    Chronic pruritus, onset 3/2015    S/P ablation of atrial flutter    JOSE (generalized anxiety disorder)    Other spondylosis, lumbar region    Cervical spondylosis    Medication intolerance, number of meds, Crestor with itching and hemorrhagic blebs    Anticoagulant long-term use    GERD (gastroesophageal reflux disease)    Mild intermittent asthma without complication    Elevated troponin    BMI less than 19,adult    Localized hives     Elevated LFTs    Iron deficiency anemia due to chronic blood loss    Recurrent major depressive disorder    Gastroenteritis    Other spondylosis, cervical region    History of DVT (deep vein thrombosis)    Chronic sinus complaints    Second degree AV block, Mobitz type I    Anemia    Syncope    Protein calorie malnutrition    At risk for cardiovascular event    Atrial fibrillation    Long term (current) use of anticoagulants    Atrial arrhythmia    Cardiac pacemaker in situ, [employer], dual chamber, 10/14/2021    Cognitive impairment    Frail elderly    Dehydration, moderate    Uveitis-hyphema-glaucoma syndrome of right eye    Primary open angle glaucoma (POAG) of both eyes, indeterminate stage    Pseudophakia of both eyes    Chronic fatigue    Anorexia nervosa    Benzodiazepine dependence, Ativan 0.5 mg daily since 2021    Panic attacks, onset 6/2023    NILSA on CPAP, using 53% to 70%    Dysphagia    Heart failure with preserved ejection fraction    Dyspnea    Nodular thyroid disease    Prerenal azotemia    Stage 3a chronic kidney disease    Prurigo nodularis    Urticarial dermatitis    Other osteoporosis without current pathological fracture    Elevated AST (SGOT)     Total time spend including review of record prior to face-to-face visit is 40 minutes. Greater than 50% of the time was spent in counseling and coordination of care. The above assessment and plan have been discussed at length. Referring provider's note reviewed. Labs and procedure over the last 6 months reviewed. Problem List updated. Asked to bring in all active medications / pills bottles with next visit. Will send note to referring / PCP.

## 2025-07-14 RX ORDER — BUSPIRONE HYDROCHLORIDE 10 MG/1
10 TABLET ORAL 3 TIMES DAILY
Qty: 270 TABLET | Refills: 0 | Status: SHIPPED | OUTPATIENT
Start: 2025-07-14

## 2025-07-15 LAB
OHS CV AF BURDEN PERCENT: < 1
OHS CV DC REMOTE DEVICE TYPE: NORMAL
OHS CV ICD SHOCK: NO
OHS CV RV PACING PERCENT: 45 %

## 2025-07-16 NOTE — PROGRESS NOTES
Outpatient Psychiatry Follow-Up Visit    Clinical Status of Patient: Outpatient (Ambulatory)  07/17/2025     Chief Complaint: 76 y/o female presenting today with  for a follow-up.       Interval History and Content of Current Session:  Interim Events/Subjective Report/Content of Current Session: 78 y/o female follow-up appointment.    Pt is a 76 y/o female with past psychiatric hx of depression, anxiety, eating disorder who presents for follow-up treatment. Patient has maintained a weight of 126-127 lbs, an increase from 123 lbs at the last visit. She has expanded her food choices, including turkey sandwiches and spaghetti, but still experiences some difficulty with chewing and fears of choking.     Patient's anxiety has significantly improved, with lorazepam use reduced to approximately twice a month from previous daily use. She continues to take Trintellix, mirtazapine at night and buspirone twice daily (reduced from 3 times daily). Patient reports sleeping well, getting about 9 hours of sleep per night.    Patient engages in cognitive activities such as crossword puzzles and games to challenge her mind, and continues to play the flute. Her caregiver reports increased activity and apparent strength.    Patient uses a CPAP machine for sleep and has a pacemaker, which recently received a good report. An EKG was performed last week, with apparently improved results.    There was a brief mention of the patient feeling anxious last week, requiring the use of a lorazepam tablet. However, this is not a daily occurrence and represents a significant improvement from a year ago. Patient denies feeling anxious most days and experiencing significant depression symptoms.    Past Psychiatric hx: remeron, rexulti, vraylar, Limbitrol, venlafaxine, fluoxetine, Abilify, gabapentin, amitriptyline, buspirone, cannot remember others.     Past Medical hx:   Past Medical History:   Diagnosis Date    Anticoagulant long-term use      Anxiety     Arthritis     Atrial fibrillation     Basal cell carcinoma     Cancer     skin    CHF (congestive heart failure)     Depression     DVT (deep venous thrombosis)     GERD (gastroesophageal reflux disease)     Glaucoma (increased eye pressure)     Hyperlipidemia     diet controlled    Hypertension     Interstitial lung disease     Localized hives 01/10/2020    Mild persistent asthma without complication 11/12/2018    Pacemaker     Pneumonia 01/31/2014    Stroke 06/03/2014    Stroke     TIA (transient ischemic attack)     TIA (transient ischemic attack)         Interim hx:  Medication changes last visit: none     Denies suicidal/homicidal ideations.  Denies hopelessness/worthlessness.    Denies auditory/visual hallucinations      Alcohol: Infrequent use  Drug: Pt denied  Caffeine: Not assessed  Tobacco: Pt denied      Review of Systems   PSYCHIATRIC: Pertinent items are noted in the narrative.        CONSTITUTIONAL: weight stable    Past Medical, Family and Social History: The patient's past medical, family and social history have been reviewed and updated as appropriate within the electronic medical record. See encounter notes.     Current Psychiatric Medication:  ativan 0.5 mg PRN, buspirone 10 mg TID, trintillex 20 mg, mirtazapine 7.5 mg      Compliance: yes      Side effects: Pt denied     Risk Parameters:  Patient reports no suicidal ideation  Patient reports no homicidal ideation  Patient reports no self-injurious behavior  Patient reports no violent behavior     Exam (detailed: at least 9 elements; comprehensive: all 15 elements)   Constitutional  Vitals:  Most recent vital signs, dated less than 90 days prior to this appointment, were reviewed. Pulse:  [74]   BP: (128)/(71)       General:  unremarkable, age appropriate, casual attire, good eye contact, good rapport       Musculoskeletal  Muscle Strength/Tone:  no flaccidity, no tremor    Gait & Station:  normal      Psychiatric                        Speech:  normal tone, normal rate, rhythm, and volume   Mood & Affect:   Mildly anxious         Thought Process:   Goal directed; Linear    Associations:   intact   Thought Content:   No SI/HI, delusions, or paranoia, no AV/VH   Insight & Judgement:   Good, adequate to circumstances   Orientation:   grossly intact; alert and oriented x 4    Memory:  intact for content of interview    Language:  grossly intact, can repeat    Attention Span  : Grossly intact for content of interview   Fund of Knowledge:   intact and appropriate to age and level of education        Assessment and Diagnosis   Status/Progress/Impression:     Assessment & Plan    IMPRESSION:  - Continued to show incremental progress in weight gain and eating habits.  - Anxiety symptoms significantly decreased, with lorazepam use reduced to approximately twice a month.  - Choking fears persisted but were not preventing eating; expanded food choices.  - Considered maintaining current medication regimen given overall improvement.    PLAN SUMMARY:  - Change Buspirone to twice daily dosing  - Continue other medications as prescribed. (LA  Checked)  - Follow-up in 3 months    Diagnosis:   1) Major Depressive Disorder, recurrent, moderate  2) Generalized Anxiety Disorder  3) Specific Phobia  4) Panic Disorder   5) Personality Disorder traits  6) R/o Dementia of unknown etiology  Intervention/Counseling/Treatment Plan   Medication Management:      1. ativan 0.5 mg PRN (very infrequently)    2. Decrease buspirone to 10 mg BID    3. trintillex 20 mg    4. mirtazapine 7.5 mg     5. Call to report any worsening of symptoms or problems with the medication. Pt instructed to go to ER with thoughts of harming self, others     6. Cont in therapy as needed    Psychotherapy: none  Target symptoms: anxiety  Why chosen therapy is appropriate versus another modality: CBT used; relevant to diagnosis, patient responds to this modality  Outcome monitoring methods:  self-report, observation  Therapeutic intervention type: Cognitive Behavioral Therapy, Coping  Topics discussed/themes: building skills sets for symptom management, symptom recognition, nutrition, exercise  The patient's response to the intervention is accepting  Patient's response to treatment is: good.   The patient's progress toward treatment goals: limited to fair     Return to clinic: 3 months    -Cognitive-Behavioral/Supportive therapy and psychoeducation provided  -R/B/SE's of medications discussed with the pt who expresses understanding and chooses to take medications as prescribed.   -Pt instructed to call clinic, 911 or go to nearest emergency room if sxs worsen or pt is in   crisis. The pt expresses understanding.    Galo Lipscomb, PhD, MP    Visit today included increased complexity associated with the care of the episodic problem depression, anxiety addressed and managing the longitudinal care of the patient due to the serious and/or complex managed problem(s) depression, anxiety.      This note was generated with the assistance of ambient listening technology. Verbal consent was obtained by the patient and accompanying visitor(s) for the recording of patient appointment to facilitate this note. I attest to having reviewed and edited the generated note for accuracy, though some syntax or spelling errors may persist. Please contact the author of this note for any clarification.     Antidepressant/Antianxiety Medication Initiation:  Patient informed of risks, benefits, and potential side effects of medication and accepts informed consent.  Common side effects include nausea, fatigue, headache, insomnia., Specifically discussed the possibility of new or worsening suicidal thoughts/depression.  Patient instructed to stop the medication immediately and seek urgent treatment if this occurs. Patient instructed not to abruptly discontinue medication without physician guidance except in cases of sudden onset or  worsening of SI.       Stimulant Medication Initiation:  Patient advised of risks, benefits, and side effects of medication and accepts informed consent.  Common side effects include insomnia, irritability, jittery feeling, dry mouth, and agitation/hostility., Patient advised of potential addictive nature of medication and controlled substance classification.  Instructed to safeguard medication as no early refills can be given for lost or stolen medications.       Benzodiazepine Initiation:  Patient advised of the risks, benefits, and common side effects of medication and has accepted informed consent.  Common side effects include drowsiness, impaired coordination, possible memory loss., Patient advised NOT to operate a vehicle or machinery untiil they are sure how the medication will affec them.  Client also advised of danger of mixing this medication with alcohol., Patient advised of potential addictive nature of medication and need to safeguard medication as no early refills for lost or stolen medications can be authorized.

## 2025-07-17 ENCOUNTER — OFFICE VISIT (OUTPATIENT)
Dept: PSYCHIATRY | Facility: CLINIC | Age: 80
End: 2025-07-17
Payer: MEDICARE

## 2025-07-17 VITALS
HEIGHT: 67 IN | BODY MASS INDEX: 19.93 KG/M2 | DIASTOLIC BLOOD PRESSURE: 71 MMHG | HEART RATE: 74 BPM | SYSTOLIC BLOOD PRESSURE: 128 MMHG | WEIGHT: 127 LBS

## 2025-07-17 DIAGNOSIS — F41.0 PANIC DISORDER: ICD-10-CM

## 2025-07-17 DIAGNOSIS — F41.1 GENERALIZED ANXIETY DISORDER: ICD-10-CM

## 2025-07-17 DIAGNOSIS — F40.298 SPECIFIC PHOBIA: ICD-10-CM

## 2025-07-17 DIAGNOSIS — F33.1 MAJOR DEPRESSIVE DISORDER, RECURRENT, MODERATE: Primary | ICD-10-CM

## 2025-07-17 PROCEDURE — 99999 PR PBB SHADOW E&M-EST. PATIENT-LVL III: CPT | Mod: PBBFAC,,, | Performed by: PSYCHOLOGIST

## 2025-07-17 PROCEDURE — 3288F FALL RISK ASSESSMENT DOCD: CPT | Mod: CPTII,S$GLB,, | Performed by: PSYCHOLOGIST

## 2025-07-17 PROCEDURE — G2211 COMPLEX E/M VISIT ADD ON: HCPCS | Mod: S$GLB,,, | Performed by: PSYCHOLOGIST

## 2025-07-17 PROCEDURE — 1157F ADVNC CARE PLAN IN RCRD: CPT | Mod: CPTII,S$GLB,, | Performed by: PSYCHOLOGIST

## 2025-07-17 PROCEDURE — 1101F PT FALLS ASSESS-DOCD LE1/YR: CPT | Mod: CPTII,S$GLB,, | Performed by: PSYCHOLOGIST

## 2025-07-17 PROCEDURE — 99214 OFFICE O/P EST MOD 30 MIN: CPT | Mod: S$GLB,,, | Performed by: PSYCHOLOGIST

## 2025-07-17 PROCEDURE — 1126F AMNT PAIN NOTED NONE PRSNT: CPT | Mod: CPTII,S$GLB,, | Performed by: PSYCHOLOGIST

## 2025-07-17 PROCEDURE — 90833 PSYTX W PT W E/M 30 MIN: CPT | Mod: S$GLB,,, | Performed by: PSYCHOLOGIST

## 2025-07-17 PROCEDURE — 1159F MED LIST DOCD IN RCRD: CPT | Mod: CPTII,S$GLB,, | Performed by: PSYCHOLOGIST

## 2025-07-17 PROCEDURE — 3074F SYST BP LT 130 MM HG: CPT | Mod: CPTII,S$GLB,, | Performed by: PSYCHOLOGIST

## 2025-07-17 PROCEDURE — 3078F DIAST BP <80 MM HG: CPT | Mod: CPTII,S$GLB,, | Performed by: PSYCHOLOGIST

## 2025-07-17 RX ORDER — MIRTAZAPINE 7.5 MG/1
7.5 TABLET, FILM COATED ORAL NIGHTLY
Qty: 90 TABLET | Refills: 0 | Status: SHIPPED | OUTPATIENT
Start: 2025-07-17 | End: 2026-07-17

## 2025-07-17 RX ORDER — LORAZEPAM 0.5 MG/1
0.5 TABLET ORAL DAILY PRN
Qty: 30 TABLET | Refills: 0 | Status: SHIPPED | OUTPATIENT
Start: 2025-07-17

## 2025-07-17 RX ORDER — VORTIOXETINE 20 MG/1
20 TABLET, FILM COATED ORAL DAILY
Qty: 90 TABLET | Refills: 0 | Status: SHIPPED | OUTPATIENT
Start: 2025-07-17

## 2025-08-13 ENCOUNTER — LAB VISIT (OUTPATIENT)
Dept: LAB | Facility: HOSPITAL | Age: 80
End: 2025-08-13
Payer: MEDICARE

## 2025-08-13 ENCOUNTER — OFFICE VISIT (OUTPATIENT)
Dept: FAMILY MEDICINE | Facility: CLINIC | Age: 80
End: 2025-08-13
Payer: MEDICARE

## 2025-08-13 VITALS
HEIGHT: 67 IN | DIASTOLIC BLOOD PRESSURE: 70 MMHG | SYSTOLIC BLOOD PRESSURE: 112 MMHG | RESPIRATION RATE: 12 BRPM | WEIGHT: 129.88 LBS | OXYGEN SATURATION: 98 % | BODY MASS INDEX: 20.38 KG/M2 | TEMPERATURE: 98 F | HEART RATE: 70 BPM

## 2025-08-13 DIAGNOSIS — E78.2 MIXED HYPERLIPIDEMIA: ICD-10-CM

## 2025-08-13 DIAGNOSIS — H40.1134 PRIMARY OPEN ANGLE GLAUCOMA (POAG) OF BOTH EYES, INDETERMINATE STAGE: ICD-10-CM

## 2025-08-13 DIAGNOSIS — I50.30 HEART FAILURE WITH PRESERVED EJECTION FRACTION, UNSPECIFIED HF CHRONICITY: ICD-10-CM

## 2025-08-13 DIAGNOSIS — D64.9 LOW HEMOGLOBIN: ICD-10-CM

## 2025-08-13 DIAGNOSIS — K21.9 GASTROESOPHAGEAL REFLUX DISEASE WITHOUT ESOPHAGITIS: ICD-10-CM

## 2025-08-13 DIAGNOSIS — I10 PRIMARY HYPERTENSION: Primary | ICD-10-CM

## 2025-08-13 DIAGNOSIS — F50.00 ANOREXIA NERVOSA: ICD-10-CM

## 2025-08-13 DIAGNOSIS — L29.9 PRURITUS: ICD-10-CM

## 2025-08-13 DIAGNOSIS — I48.0 PAROXYSMAL ATRIAL FIBRILLATION: ICD-10-CM

## 2025-08-13 LAB
ABSOLUTE EOSINOPHIL (OHS): 0.5 K/UL
ABSOLUTE MONOCYTE (OHS): 1.12 K/UL (ref 0.3–1)
ABSOLUTE NEUTROPHIL COUNT (OHS): 5.28 K/UL (ref 1.8–7.7)
BASOPHILS # BLD AUTO: 0.06 K/UL
BASOPHILS NFR BLD AUTO: 0.7 %
ERYTHROCYTE [DISTWIDTH] IN BLOOD BY AUTOMATED COUNT: 14.1 % (ref 11.5–14.5)
HCT VFR BLD AUTO: 37.8 % (ref 37–48.5)
HGB BLD-MCNC: 11.9 GM/DL (ref 12–16)
IMM GRANULOCYTES # BLD AUTO: 0.03 K/UL (ref 0–0.04)
IMM GRANULOCYTES NFR BLD AUTO: 0.4 % (ref 0–0.5)
LYMPHOCYTES # BLD AUTO: 1.06 K/UL (ref 1–4.8)
MCH RBC QN AUTO: 31.2 PG (ref 27–31)
MCHC RBC AUTO-ENTMCNC: 31.5 G/DL (ref 32–36)
MCV RBC AUTO: 99 FL (ref 82–98)
NUCLEATED RBC (/100WBC) (OHS): 0 /100 WBC
PLATELET # BLD AUTO: 274 K/UL (ref 150–450)
PMV BLD AUTO: 9.4 FL (ref 9.2–12.9)
RBC # BLD AUTO: 3.82 M/UL (ref 4–5.4)
RELATIVE EOSINOPHIL (OHS): 6.2 %
RELATIVE LYMPHOCYTE (OHS): 13.2 % (ref 18–48)
RELATIVE MONOCYTE (OHS): 13.9 % (ref 4–15)
RELATIVE NEUTROPHIL (OHS): 65.6 % (ref 38–73)
WBC # BLD AUTO: 8.05 K/UL (ref 3.9–12.7)

## 2025-08-13 PROCEDURE — 1159F MED LIST DOCD IN RCRD: CPT | Mod: CPTII,S$GLB,,

## 2025-08-13 PROCEDURE — 82247 BILIRUBIN TOTAL: CPT

## 2025-08-13 PROCEDURE — 85025 COMPLETE CBC W/AUTO DIFF WBC: CPT

## 2025-08-13 PROCEDURE — 1160F RVW MEDS BY RX/DR IN RCRD: CPT | Mod: CPTII,S$GLB,,

## 2025-08-13 PROCEDURE — 3078F DIAST BP <80 MM HG: CPT | Mod: CPTII,S$GLB,,

## 2025-08-13 PROCEDURE — 99999 PR PBB SHADOW E&M-EST. PATIENT-LVL V: CPT | Mod: PBBFAC,,,

## 2025-08-13 PROCEDURE — G2211 COMPLEX E/M VISIT ADD ON: HCPCS | Mod: S$GLB,,,

## 2025-08-13 PROCEDURE — 36415 COLL VENOUS BLD VENIPUNCTURE: CPT | Mod: PO

## 2025-08-13 PROCEDURE — 1157F ADVNC CARE PLAN IN RCRD: CPT | Mod: CPTII,S$GLB,,

## 2025-08-13 PROCEDURE — 99214 OFFICE O/P EST MOD 30 MIN: CPT | Mod: S$GLB,,,

## 2025-08-13 PROCEDURE — 3074F SYST BP LT 130 MM HG: CPT | Mod: CPTII,S$GLB,,

## 2025-08-13 PROCEDURE — 3288F FALL RISK ASSESSMENT DOCD: CPT | Mod: CPTII,S$GLB,,

## 2025-08-13 PROCEDURE — 1101F PT FALLS ASSESS-DOCD LE1/YR: CPT | Mod: CPTII,S$GLB,,

## 2025-08-13 RX ORDER — TRIAMCINOLONE ACETONIDE 5 MG/G
OINTMENT TOPICAL 2 TIMES DAILY
Qty: 45 G | Refills: 3 | Status: SHIPPED | OUTPATIENT
Start: 2025-08-13 | End: 2025-08-20

## 2025-08-14 LAB
ALBUMIN SERPL BCP-MCNC: 4 G/DL (ref 3.5–5.2)
ALP SERPL-CCNC: 82 UNIT/L (ref 40–150)
ALT SERPL W/O P-5'-P-CCNC: 17 UNIT/L (ref 0–55)
ANION GAP (OHS): 11 MMOL/L (ref 8–16)
AST SERPL-CCNC: 37 UNIT/L (ref 0–50)
BILIRUB SERPL-MCNC: 0.7 MG/DL (ref 0.1–1)
BUN SERPL-MCNC: 33 MG/DL (ref 8–23)
CALCIUM SERPL-MCNC: 11.5 MG/DL (ref 8.7–10.5)
CHLORIDE SERPL-SCNC: 102 MMOL/L (ref 95–110)
CO2 SERPL-SCNC: 28 MMOL/L (ref 23–29)
CREAT SERPL-MCNC: 1.2 MG/DL (ref 0.5–1.4)
GFR SERPLBLD CREATININE-BSD FMLA CKD-EPI: 45 ML/MIN/1.73/M2
GLUCOSE SERPL-MCNC: 103 MG/DL (ref 70–110)
POTASSIUM SERPL-SCNC: 4.2 MMOL/L (ref 3.5–5.1)
PROT SERPL-MCNC: 7.5 GM/DL (ref 6–8.4)
SODIUM SERPL-SCNC: 141 MMOL/L (ref 136–145)

## 2025-08-21 ENCOUNTER — LAB VISIT (OUTPATIENT)
Dept: LAB | Facility: HOSPITAL | Age: 80
End: 2025-08-21
Payer: MEDICARE

## 2025-08-21 DIAGNOSIS — E83.52 HYPERCALCEMIA: ICD-10-CM

## 2025-08-21 LAB
CA-I BLD-SCNC: 1.17 MMOL/L (ref 1.06–1.42)
PHOSPHATE SERPL-MCNC: 4.2 MG/DL (ref 2.7–4.5)
PTH-INTACT SERPL-MCNC: 94.6 PG/ML (ref 9–77)

## 2025-08-21 PROCEDURE — 82330 ASSAY OF CALCIUM: CPT

## 2025-08-21 PROCEDURE — 82306 VITAMIN D 25 HYDROXY: CPT

## 2025-08-21 PROCEDURE — 84100 ASSAY OF PHOSPHORUS: CPT

## 2025-08-21 PROCEDURE — 36415 COLL VENOUS BLD VENIPUNCTURE: CPT | Mod: PO

## 2025-08-21 PROCEDURE — 83970 ASSAY OF PARATHORMONE: CPT

## 2025-08-22 LAB — 25(OH)D3+25(OH)D2 SERPL-MCNC: 66 NG/ML (ref 30–96)

## 2025-09-04 DIAGNOSIS — S01.319A TEAR OF EARLOBE, UNSPECIFIED LATERALITY, INITIAL ENCOUNTER: Primary | ICD-10-CM

## 2025-09-04 RX ORDER — MUPIROCIN 20 MG/G
OINTMENT TOPICAL 3 TIMES DAILY
Qty: 30 G | Refills: 0 | Status: SHIPPED | OUTPATIENT
Start: 2025-09-04

## (undated) DEVICE — GAUZE FLUFF XXLG 36X36 2 PLY

## (undated) DEVICE — NDL HYPODERMIC BLUNT 18G 1.5IN

## (undated) DEVICE — SYS LABEL CORRECT MED

## (undated) DEVICE — BIT DRILL 2.8MM W/DEPTH MARKS

## (undated) DEVICE — NDL SAFETY 25G X 1.5 ECLIPSE

## (undated) DEVICE — DRAPE C ARM 42 X 120 10/BX

## (undated) DEVICE — GLOVE SURG ULTRA TOUCH 7.5

## (undated) DEVICE — CHLORAPREP 10.5 ML APPLICATOR

## (undated) DEVICE — ELECTRODE REM PLYHSV RETURN 9

## (undated) DEVICE — SUT 2-0 VICRYL / CT-1

## (undated) DEVICE — BIT DRILL 2MM W/DEPTH MARKING

## (undated) DEVICE — SHEET DRAPE MEDIUM

## (undated) DEVICE — SUT ETHILON 3-0 PS2 18 BLK

## (undated) DEVICE — PAD DEFIB CADENCE ADULT R2

## (undated) DEVICE — SYR 10CC LUER LOCK

## (undated) DEVICE — SLEEVE SCD EXPRESS CALF MEDIUM

## (undated) DEVICE — APPLICATOR CHLORAPREP CLR 10.5

## (undated) DEVICE — ADHESIVE DERMABOND ADVANCED

## (undated) DEVICE — SYR DISP LL 5CC

## (undated) DEVICE — NDL TUOHY EPIDURAL 20G X 3.5

## (undated) DEVICE — PACK CUSTOM EYE KIT

## (undated) DEVICE — CANNULA RADIOPAQUE 20G CURVED

## (undated) DEVICE — BANDAGE ESMARK 6X12

## (undated) DEVICE — TUBING MINIBORE EXTENSION

## (undated) DEVICE — KNIFE ANGLE 1MM

## (undated) DEVICE — GLOVE PROTEXIS PI CLASSIC 7.5

## (undated) DEVICE — UNDERGLOVES BIOGEL PI SZ 7 LF

## (undated) DEVICE — PAD ELECTROSURGICAL PAT PLATE

## (undated) DEVICE — UNDERGLOVES BIOGEL PI SIZE 8.5

## (undated) DEVICE — PAD GROUNDING DISPER ELECTRODE

## (undated) DEVICE — GAUZE SPONGE 4X4 12PLY

## (undated) DEVICE — SEE MEDLINE ITEM 152622

## (undated) DEVICE — SEE MEDLINE ITEM 146271

## (undated) DEVICE — SYR GLASS 5CC LUER LOK

## (undated) DEVICE — GOWN SURG 2XL DISP TIE BACK

## (undated) DEVICE — DRAPE STERI INCISE MED 130X130

## (undated) DEVICE — PACK PACER PERMANENT

## (undated) DEVICE — SHIELD EYE PLASTIC 3100G

## (undated) DEVICE — GLOVE SURG ULTRA TOUCH 7

## (undated) DEVICE — KNIFE SLIT PHACO 2.4MM

## (undated) DEVICE — NDL SPINAL SPINOCAN 22GX3.5

## (undated) DEVICE — NDL FLTR 5MCRN BLNT TIP 18GX1

## (undated) DEVICE — SPONGE LAP 18X18 PREWASHED

## (undated) DEVICE — APPLICATOR CHLORAPREP ORN 26ML

## (undated) DEVICE — BLADE SURG BVL ANG COAX 2.4MM

## (undated) DEVICE — GLOVE SURG ULTRA TOUCH 8.5

## (undated) DEVICE — PROBE ANTERIOR VITRECTOMY

## (undated) DEVICE — SPLINT FIBERGLASS PAD 5X20

## (undated) DEVICE — DRESSING AQUACEL AG ADV 3.5X6

## (undated) DEVICE — BAG PATIENT BELONGING

## (undated) DEVICE — CANNULA CVD 100MM X 20G

## (undated) DEVICE — DRAPE STERI U-SHAPED 47X51IN

## (undated) DEVICE — DRESSING N ADH OIL EMUL 3X8

## (undated) DEVICE — STRAP OR TABLE 5IN X 72IN

## (undated) DEVICE — SYR LUER LOCK 1CC

## (undated) DEVICE — SEE MEDLINE ITEM 153151

## (undated) DEVICE — PAD ABD 8X10 STERILE

## (undated) DEVICE — SOL BETADINE 5%

## (undated) DEVICE — SLEEVE ULTRA INFUSION

## (undated) DEVICE — PACK CUSTOM UNIV BASIN SLI

## (undated) DEVICE — SEE MEDLINE ITEM 157117

## (undated) DEVICE — K-WIRE SNGL TRCR .045 D 6L N/S
Type: IMPLANTABLE DEVICE | Site: ANKLE | Status: NON-FUNCTIONAL
Removed: 2018-11-01

## (undated) DEVICE — SHEATH SAFESHEATH II ULTRA 6FR

## (undated) DEVICE — DRAPE INCISE IOBAN 2 23X17IN

## (undated) DEVICE — SEE MEDLINE ITEM 157131

## (undated) DEVICE — SUT 0 VICRYL / CT-1

## (undated) DEVICE — TIP I & A

## (undated) DEVICE — SAFESHEATH II 8FR 13CM

## (undated) DEVICE — HANDPIECE TRANSFORMER I/A

## (undated) DEVICE — BANDAGE ELASTOPLAST 3INX5YDS